# Patient Record
Sex: MALE | Race: WHITE | Employment: OTHER
[De-identification: names, ages, dates, MRNs, and addresses within clinical notes are randomized per-mention and may not be internally consistent; named-entity substitution may affect disease eponyms.]

---

## 2017-01-03 ENCOUNTER — CARE COORDINATION (OUTPATIENT)
Dept: CARE COORDINATION | Facility: CLINIC | Age: 62
End: 2017-01-03

## 2017-01-03 RX ORDER — GLUCOSAMINE HCL/CHONDROITIN SU 500-400 MG
CAPSULE ORAL
Qty: 100 STRIP | Refills: 3 | Status: SHIPPED | OUTPATIENT
Start: 2017-01-03 | End: 2017-03-29 | Stop reason: ALTCHOICE

## 2017-01-04 ENCOUNTER — TELEPHONE (OUTPATIENT)
Dept: FAMILY MEDICINE CLINIC | Facility: CLINIC | Age: 62
End: 2017-01-04

## 2017-01-09 ENCOUNTER — CARE COORDINATION (OUTPATIENT)
Dept: CARE COORDINATION | Facility: CLINIC | Age: 62
End: 2017-01-09

## 2017-01-09 ENCOUNTER — TELEPHONE (OUTPATIENT)
Dept: FAMILY MEDICINE CLINIC | Facility: CLINIC | Age: 62
End: 2017-01-09

## 2017-01-15 ENCOUNTER — TELEPHONE (OUTPATIENT)
Dept: FAMILY MEDICINE CLINIC | Facility: CLINIC | Age: 62
End: 2017-01-15

## 2017-02-08 ENCOUNTER — CARE COORDINATION (OUTPATIENT)
Dept: CARE COORDINATION | Facility: CLINIC | Age: 62
End: 2017-02-08

## 2017-03-06 RX ORDER — AMLODIPINE BESYLATE 5 MG/1
5 TABLET ORAL DAILY
Qty: 30 TABLET | Refills: 0 | Status: CANCELLED | OUTPATIENT
Start: 2017-03-06

## 2017-03-07 RX ORDER — LANOLIN ALCOHOL/MO/W.PET/CERES
325 CREAM (GRAM) TOPICAL 2 TIMES DAILY WITH MEALS
Qty: 60 TABLET | Refills: 0 | Status: SHIPPED | OUTPATIENT
Start: 2017-03-07 | End: 2017-05-25 | Stop reason: SDUPTHER

## 2017-03-07 RX ORDER — ESOMEPRAZOLE MAGNESIUM 40 MG/1
CAPSULE, DELAYED RELEASE ORAL
Qty: 180 CAPSULE | Refills: 0 | Status: SHIPPED | OUTPATIENT
Start: 2017-03-07 | End: 2017-06-22 | Stop reason: SDUPTHER

## 2017-03-07 RX ORDER — AMLODIPINE BESYLATE 5 MG/1
5 TABLET ORAL DAILY
Qty: 30 TABLET | Refills: 0 | Status: SHIPPED | OUTPATIENT
Start: 2017-03-07 | End: 2017-05-25 | Stop reason: SDUPTHER

## 2017-03-07 RX ORDER — CLOPIDOGREL BISULFATE 75 MG/1
75 TABLET ORAL DAILY
Qty: 30 TABLET | Refills: 0 | Status: SHIPPED | OUTPATIENT
Start: 2017-03-07 | End: 2017-05-25 | Stop reason: SDUPTHER

## 2017-03-10 ENCOUNTER — CARE COORDINATION (OUTPATIENT)
Dept: CARE COORDINATION | Facility: CLINIC | Age: 62
End: 2017-03-10

## 2017-03-13 ENCOUNTER — CARE COORDINATION (OUTPATIENT)
Dept: CASE MANAGEMENT | Age: 62
End: 2017-03-13

## 2017-03-14 ENCOUNTER — CARE COORDINATION (OUTPATIENT)
Dept: CASE MANAGEMENT | Age: 62
End: 2017-03-14

## 2017-03-15 ENCOUNTER — CARE COORDINATION (OUTPATIENT)
Dept: CASE MANAGEMENT | Age: 62
End: 2017-03-15

## 2017-03-16 ENCOUNTER — CARE COORDINATION (OUTPATIENT)
Dept: CASE MANAGEMENT | Age: 62
End: 2017-03-16

## 2017-03-17 ENCOUNTER — CARE COORDINATION (OUTPATIENT)
Dept: CASE MANAGEMENT | Age: 62
End: 2017-03-17

## 2017-03-17 DIAGNOSIS — Z79.4 TYPE 2 DIABETES MELLITUS WITH DIABETIC POLYNEUROPATHY, WITH LONG-TERM CURRENT USE OF INSULIN (HCC): Primary | ICD-10-CM

## 2017-03-17 DIAGNOSIS — E11.42 TYPE 2 DIABETES MELLITUS WITH DIABETIC POLYNEUROPATHY, WITH LONG-TERM CURRENT USE OF INSULIN (HCC): Primary | ICD-10-CM

## 2017-03-20 ENCOUNTER — TELEPHONE (OUTPATIENT)
Dept: FAMILY MEDICINE CLINIC | Age: 62
End: 2017-03-20

## 2017-03-20 ENCOUNTER — CARE COORDINATION (OUTPATIENT)
Dept: CASE MANAGEMENT | Age: 62
End: 2017-03-20

## 2017-03-20 DIAGNOSIS — Z79.4 TYPE 2 DIABETES MELLITUS WITH DIABETIC POLYNEUROPATHY, WITH LONG-TERM CURRENT USE OF INSULIN (HCC): ICD-10-CM

## 2017-03-20 DIAGNOSIS — E11.42 TYPE 2 DIABETES MELLITUS WITH DIABETIC POLYNEUROPATHY, WITH LONG-TERM CURRENT USE OF INSULIN (HCC): ICD-10-CM

## 2017-03-20 RX ORDER — OXYCODONE HYDROCHLORIDE AND ACETAMINOPHEN 5; 325 MG/1; MG/1
1 TABLET ORAL EVERY 6 HOURS PRN
Qty: 40 TABLET | Refills: 0 | Status: SHIPPED | OUTPATIENT
Start: 2017-03-20 | End: 2017-03-29 | Stop reason: SDUPTHER

## 2017-03-22 ENCOUNTER — EPISODE CHANGES (OUTPATIENT)
Dept: CASE MANAGEMENT | Age: 62
End: 2017-03-22

## 2017-03-28 ENCOUNTER — OFFICE VISIT (OUTPATIENT)
Dept: FAMILY MEDICINE CLINIC | Age: 62
End: 2017-03-28
Payer: MEDICARE

## 2017-03-28 VITALS — SYSTOLIC BLOOD PRESSURE: 105 MMHG | DIASTOLIC BLOOD PRESSURE: 62 MMHG | HEART RATE: 79 BPM | TEMPERATURE: 97.1 F

## 2017-03-28 DIAGNOSIS — E11.42 DM TYPE 2 WITH DIABETIC PERIPHERAL NEUROPATHY (HCC): ICD-10-CM

## 2017-03-28 DIAGNOSIS — L03.90 CELLULITIS, UNSPECIFIED CELLULITIS SITE: Primary | ICD-10-CM

## 2017-03-28 DIAGNOSIS — N18.9 CHRONIC KIDNEY DISEASE, UNSPECIFIED STAGE: ICD-10-CM

## 2017-03-28 DIAGNOSIS — I10 BENIGN ESSENTIAL HTN: ICD-10-CM

## 2017-03-28 PROCEDURE — 3046F HEMOGLOBIN A1C LEVEL >9.0%: CPT | Performed by: FAMILY MEDICINE

## 2017-03-28 PROCEDURE — G8427 DOCREV CUR MEDS BY ELIG CLIN: HCPCS | Performed by: FAMILY MEDICINE

## 2017-03-28 PROCEDURE — 99214 OFFICE O/P EST MOD 30 MIN: CPT | Performed by: FAMILY MEDICINE

## 2017-03-28 PROCEDURE — G8484 FLU IMMUNIZE NO ADMIN: HCPCS | Performed by: FAMILY MEDICINE

## 2017-03-28 PROCEDURE — 3017F COLORECTAL CA SCREEN DOC REV: CPT | Performed by: FAMILY MEDICINE

## 2017-03-28 PROCEDURE — G8420 CALC BMI NORM PARAMETERS: HCPCS | Performed by: FAMILY MEDICINE

## 2017-03-28 PROCEDURE — 1036F TOBACCO NON-USER: CPT | Performed by: FAMILY MEDICINE

## 2017-03-28 RX ORDER — DOXYCYCLINE HYCLATE 100 MG
100 TABLET ORAL 2 TIMES DAILY
Qty: 14 TABLET | Refills: 0 | Status: SHIPPED | OUTPATIENT
Start: 2017-03-28 | End: 2017-04-04

## 2017-03-28 RX ORDER — CEPHALEXIN 500 MG/1
500 CAPSULE ORAL 3 TIMES DAILY
Qty: 21 CAPSULE | Refills: 0 | Status: SHIPPED | OUTPATIENT
Start: 2017-03-28 | End: 2017-04-04

## 2017-03-28 ASSESSMENT — ENCOUNTER SYMPTOMS
VOMITING: 0
ABDOMINAL PAIN: 0
SHORTNESS OF BREATH: 0
COUGH: 1
DIARRHEA: 0

## 2017-03-29 RX ORDER — OXYCODONE HYDROCHLORIDE AND ACETAMINOPHEN 5; 325 MG/1; MG/1
1 TABLET ORAL EVERY 6 HOURS PRN
Qty: 40 TABLET | Refills: 0 | Status: SHIPPED | OUTPATIENT
Start: 2017-03-29 | End: 2017-04-07 | Stop reason: SDUPTHER

## 2017-03-29 ASSESSMENT — ENCOUNTER SYMPTOMS
BLOOD IN STOOL: 0
WHEEZING: 0
RHINORRHEA: 1
SINUS PRESSURE: 0
SORE THROAT: 0
EYE PAIN: 0

## 2017-04-07 ENCOUNTER — CARE COORDINATOR VISIT (OUTPATIENT)
Dept: CARE COORDINATION | Age: 62
End: 2017-04-07

## 2017-04-07 ENCOUNTER — OFFICE VISIT (OUTPATIENT)
Dept: FAMILY MEDICINE CLINIC | Age: 62
End: 2017-04-07
Payer: MEDICARE

## 2017-04-07 ENCOUNTER — CARE COORDINATION (OUTPATIENT)
Dept: CARE COORDINATION | Age: 62
End: 2017-04-07

## 2017-04-07 VITALS
SYSTOLIC BLOOD PRESSURE: 111 MMHG | HEART RATE: 72 BPM | TEMPERATURE: 97.2 F | DIASTOLIC BLOOD PRESSURE: 66 MMHG | OXYGEN SATURATION: 99 %

## 2017-04-07 DIAGNOSIS — G89.4 CHRONIC PAIN SYNDROME: ICD-10-CM

## 2017-04-07 DIAGNOSIS — L03.211 CELLULITIS OF FACE: ICD-10-CM

## 2017-04-07 DIAGNOSIS — E11.42 DM TYPE 2 WITH DIABETIC PERIPHERAL NEUROPATHY (HCC): Primary | ICD-10-CM

## 2017-04-07 DIAGNOSIS — I10 BENIGN ESSENTIAL HTN: ICD-10-CM

## 2017-04-07 PROCEDURE — G8427 DOCREV CUR MEDS BY ELIG CLIN: HCPCS | Performed by: FAMILY MEDICINE

## 2017-04-07 PROCEDURE — 3017F COLORECTAL CA SCREEN DOC REV: CPT | Performed by: FAMILY MEDICINE

## 2017-04-07 PROCEDURE — 3046F HEMOGLOBIN A1C LEVEL >9.0%: CPT | Performed by: FAMILY MEDICINE

## 2017-04-07 PROCEDURE — 99214 OFFICE O/P EST MOD 30 MIN: CPT | Performed by: FAMILY MEDICINE

## 2017-04-07 PROCEDURE — 1036F TOBACCO NON-USER: CPT | Performed by: FAMILY MEDICINE

## 2017-04-07 PROCEDURE — G8420 CALC BMI NORM PARAMETERS: HCPCS | Performed by: FAMILY MEDICINE

## 2017-04-07 RX ORDER — OXYCODONE HYDROCHLORIDE AND ACETAMINOPHEN 5; 325 MG/1; MG/1
1 TABLET ORAL EVERY 6 HOURS PRN
Qty: 60 TABLET | Refills: 0 | Status: SHIPPED | OUTPATIENT
Start: 2017-04-07 | End: 2017-04-24 | Stop reason: SDUPTHER

## 2017-04-07 ASSESSMENT — ENCOUNTER SYMPTOMS
WHEEZING: 0
TROUBLE SWALLOWING: 0
DIARRHEA: 0
BLOOD IN STOOL: 0
SORE THROAT: 0
ABDOMINAL PAIN: 0
SHORTNESS OF BREATH: 0
VOMITING: 0

## 2017-04-17 RX ORDER — INSULIN GLARGINE 100 [IU]/ML
INJECTION, SOLUTION SUBCUTANEOUS
Qty: 15 ML | Refills: 2 | Status: SHIPPED | OUTPATIENT
Start: 2017-04-17 | End: 2017-05-25 | Stop reason: SDUPTHER

## 2017-04-21 ENCOUNTER — CARE COORDINATOR VISIT (OUTPATIENT)
Dept: FAMILY MEDICINE CLINIC | Age: 62
End: 2017-04-21

## 2017-04-21 ENCOUNTER — OFFICE VISIT (OUTPATIENT)
Dept: FAMILY MEDICINE CLINIC | Age: 62
End: 2017-04-21
Payer: MEDICARE

## 2017-04-21 ENCOUNTER — HOSPITAL ENCOUNTER (OUTPATIENT)
Age: 62
Discharge: HOME | End: 2017-04-21
Payer: MEDICARE

## 2017-04-21 ENCOUNTER — HOSPITAL ENCOUNTER (OUTPATIENT)
Dept: GENERAL RADIOLOGY | Age: 62
Discharge: HOME | End: 2017-04-21
Payer: MEDICARE

## 2017-04-21 VITALS
RESPIRATION RATE: 18 BRPM | WEIGHT: 180.78 LBS | HEART RATE: 96 BPM | DIASTOLIC BLOOD PRESSURE: 72 MMHG | BODY MASS INDEX: 24.49 KG/M2 | HEIGHT: 72 IN | SYSTOLIC BLOOD PRESSURE: 114 MMHG | OXYGEN SATURATION: 98 %

## 2017-04-21 DIAGNOSIS — E11.42 DM TYPE 2 WITH DIABETIC PERIPHERAL NEUROPATHY (HCC): ICD-10-CM

## 2017-04-21 DIAGNOSIS — L03.818 CELLULITIS OF OTHER SPECIFIED SITE: ICD-10-CM

## 2017-04-21 DIAGNOSIS — M25.561 ACUTE PAIN OF RIGHT KNEE: Primary | ICD-10-CM

## 2017-04-21 DIAGNOSIS — M25.561 ACUTE PAIN OF RIGHT KNEE: ICD-10-CM

## 2017-04-21 DIAGNOSIS — I10 BENIGN ESSENTIAL HTN: ICD-10-CM

## 2017-04-21 DIAGNOSIS — G89.4 CHRONIC PAIN SYNDROME: ICD-10-CM

## 2017-04-21 LAB
-: NORMAL
ABSOLUTE EOS #: 0.2 K/UL (ref 0–0.4)
ABSOLUTE LYMPH #: 2 K/UL (ref 1–4.8)
ABSOLUTE MONO #: 0.8 K/UL (ref 0.2–0.8)
ALBUMIN SERPL-MCNC: 3.6 G/DL (ref 3.5–5.2)
ALBUMIN/GLOBULIN RATIO: ABNORMAL (ref 1–2.5)
ALP BLD-CCNC: 185 U/L (ref 40–129)
ALT SERPL-CCNC: 14 U/L (ref 5–41)
AMORPHOUS: NORMAL
ANION GAP SERPL CALCULATED.3IONS-SCNC: 18 MMOL/L (ref 9–17)
AST SERPL-CCNC: 9 U/L
BACTERIA: NORMAL
BASOPHILS # BLD: 1 % (ref 0–2)
BASOPHILS ABSOLUTE: 0.1 K/UL (ref 0–0.2)
BILIRUB SERPL-MCNC: <0.1 MG/DL (ref 0.3–1.2)
BILIRUBIN URINE: NEGATIVE
BUN BLDV-MCNC: 22 MG/DL (ref 6–20)
BUN/CREAT BLD: 24 (ref 9–20)
CALCIUM SERPL-MCNC: 8.6 MG/DL (ref 8.6–10.4)
CASTS UA: NORMAL /LPF
CHLORIDE BLD-SCNC: 100 MMOL/L (ref 98–107)
CO2: 21 MMOL/L (ref 20–31)
COLOR: YELLOW
COMMENT UA: ABNORMAL
CREAT SERPL-MCNC: 0.93 MG/DL (ref 0.7–1.2)
CRYSTALS, UA: NORMAL /HPF
DIFFERENTIAL TYPE: ABNORMAL
EOSINOPHILS RELATIVE PERCENT: 3 % (ref 1–4)
EPITHELIAL CELLS UA: NORMAL /HPF
ESTIMATED AVERAGE GLUCOSE: 263 MG/DL
GFR AFRICAN AMERICAN: >60 ML/MIN
GFR NON-AFRICAN AMERICAN: >60 ML/MIN
GFR SERPL CREATININE-BSD FRML MDRD: ABNORMAL ML/MIN/{1.73_M2}
GFR SERPL CREATININE-BSD FRML MDRD: ABNORMAL ML/MIN/{1.73_M2}
GLUCOSE BLD-MCNC: 335 MG/DL (ref 70–99)
GLUCOSE URINE: ABNORMAL
HBA1C MFR BLD: 10.8 % (ref 4–6)
HCT VFR BLD CALC: 35.7 % (ref 41–53)
HEMOGLOBIN: 11.4 G/DL (ref 13.5–17.5)
KETONES, URINE: NEGATIVE
LEUKOCYTE ESTERASE, URINE: NEGATIVE
LYMPHOCYTES # BLD: 23 % (ref 24–44)
MCH RBC QN AUTO: 26 PG (ref 26–34)
MCHC RBC AUTO-ENTMCNC: 31.8 G/DL (ref 31–37)
MCV RBC AUTO: 81.7 FL (ref 80–100)
MONOCYTES # BLD: 9 % (ref 1–7)
MUCUS: NORMAL
NITRITE, URINE: NEGATIVE
OTHER OBSERVATIONS UA: NORMAL
PDW BLD-RTO: 18.8 % (ref 11.5–14.5)
PH UA: 5.5 (ref 5–8)
PLATELET # BLD: 448 K/UL (ref 130–400)
PLATELET ESTIMATE: ABNORMAL
PMV BLD AUTO: 7.7 FL (ref 6–12)
POTASSIUM SERPL-SCNC: 4.4 MMOL/L (ref 3.7–5.3)
PROTEIN UA: ABNORMAL
RBC # BLD: 4.38 M/UL (ref 4.5–5.9)
RBC # BLD: ABNORMAL 10*6/UL
RBC UA: NORMAL /HPF (ref 0–2)
RENAL EPITHELIAL, UA: NORMAL /HPF
SEG NEUTROPHILS: 64 % (ref 36–66)
SEGMENTED NEUTROPHILS ABSOLUTE COUNT: 5.6 K/UL (ref 1.8–7.7)
SODIUM BLD-SCNC: 139 MMOL/L (ref 135–144)
SPECIFIC GRAVITY UA: 1.03 (ref 1–1.03)
TOTAL PROTEIN: 7.4 G/DL (ref 6.4–8.3)
TRICHOMONAS: NORMAL
TURBIDITY: CLEAR
URINE HGB: ABNORMAL
UROBILINOGEN, URINE: NORMAL
WBC # BLD: 8.7 K/UL (ref 3.5–11)
WBC # BLD: ABNORMAL 10*3/UL
WBC UA: NORMAL /HPF (ref 0–5)
YEAST: NORMAL

## 2017-04-21 PROCEDURE — 85025 COMPLETE CBC W/AUTO DIFF WBC: CPT

## 2017-04-21 PROCEDURE — 80053 COMPREHEN METABOLIC PANEL: CPT

## 2017-04-21 PROCEDURE — 99214 OFFICE O/P EST MOD 30 MIN: CPT | Performed by: FAMILY MEDICINE

## 2017-04-21 PROCEDURE — 3017F COLORECTAL CA SCREEN DOC REV: CPT | Performed by: FAMILY MEDICINE

## 2017-04-21 PROCEDURE — 3046F HEMOGLOBIN A1C LEVEL >9.0%: CPT | Performed by: FAMILY MEDICINE

## 2017-04-21 PROCEDURE — 1036F TOBACCO NON-USER: CPT | Performed by: FAMILY MEDICINE

## 2017-04-21 PROCEDURE — G8427 DOCREV CUR MEDS BY ELIG CLIN: HCPCS | Performed by: FAMILY MEDICINE

## 2017-04-21 PROCEDURE — 36415 COLL VENOUS BLD VENIPUNCTURE: CPT

## 2017-04-21 PROCEDURE — 83036 HEMOGLOBIN GLYCOSYLATED A1C: CPT

## 2017-04-21 PROCEDURE — 73562 X-RAY EXAM OF KNEE 3: CPT

## 2017-04-21 PROCEDURE — G8420 CALC BMI NORM PARAMETERS: HCPCS | Performed by: FAMILY MEDICINE

## 2017-04-21 PROCEDURE — 81001 URINALYSIS AUTO W/SCOPE: CPT

## 2017-04-21 RX ORDER — DOXYCYCLINE HYCLATE 100 MG
100 TABLET ORAL 2 TIMES DAILY
Qty: 20 TABLET | Refills: 0 | Status: SHIPPED | OUTPATIENT
Start: 2017-04-21 | End: 2017-05-01

## 2017-04-21 RX ORDER — CEPHALEXIN 500 MG/1
500 CAPSULE ORAL 3 TIMES DAILY
Qty: 30 CAPSULE | Refills: 0 | Status: SHIPPED | OUTPATIENT
Start: 2017-04-21 | End: 2017-05-01

## 2017-04-23 ASSESSMENT — ENCOUNTER SYMPTOMS
DIARRHEA: 0
WHEEZING: 0
SORE THROAT: 0
BLOOD IN STOOL: 0
ABDOMINAL PAIN: 0
TROUBLE SWALLOWING: 0
SHORTNESS OF BREATH: 0
VOMITING: 0

## 2017-04-24 RX ORDER — OXYCODONE HYDROCHLORIDE AND ACETAMINOPHEN 5; 325 MG/1; MG/1
1 TABLET ORAL EVERY 6 HOURS PRN
Qty: 60 TABLET | Refills: 0 | Status: SHIPPED | OUTPATIENT
Start: 2017-04-24 | End: 2017-05-08 | Stop reason: SDUPTHER

## 2017-04-24 RX ORDER — PREGABALIN 100 MG/1
CAPSULE ORAL
Qty: 90 CAPSULE | Refills: 0 | Status: SHIPPED | OUTPATIENT
Start: 2017-04-24 | End: 2017-09-15 | Stop reason: ALTCHOICE

## 2017-04-28 ENCOUNTER — CARE COORDINATION (OUTPATIENT)
Dept: FAMILY MEDICINE CLINIC | Age: 62
End: 2017-04-28

## 2017-05-04 ENCOUNTER — TELEPHONE (OUTPATIENT)
Dept: FAMILY MEDICINE CLINIC | Age: 62
End: 2017-05-04

## 2017-05-04 DIAGNOSIS — G89.4 CHRONIC PAIN SYNDROME: Primary | ICD-10-CM

## 2017-05-08 RX ORDER — OXYCODONE HYDROCHLORIDE AND ACETAMINOPHEN 5; 325 MG/1; MG/1
1 TABLET ORAL EVERY 6 HOURS PRN
Qty: 60 TABLET | Refills: 0 | Status: SHIPPED | OUTPATIENT
Start: 2017-05-08 | End: 2017-05-18

## 2017-05-23 ENCOUNTER — TELEPHONE (OUTPATIENT)
Dept: FAMILY MEDICINE CLINIC | Age: 62
End: 2017-05-23

## 2017-05-24 ENCOUNTER — HOSPITAL ENCOUNTER (EMERGENCY)
Age: 62
Discharge: HOME | End: 2017-05-24
Attending: EMERGENCY MEDICINE
Payer: MEDICARE

## 2017-05-24 VITALS
DIASTOLIC BLOOD PRESSURE: 68 MMHG | WEIGHT: 75.13 LBS | BODY MASS INDEX: 10.18 KG/M2 | TEMPERATURE: 97.9 F | OXYGEN SATURATION: 98 % | HEART RATE: 95 BPM | RESPIRATION RATE: 18 BRPM | SYSTOLIC BLOOD PRESSURE: 122 MMHG | HEIGHT: 72 IN

## 2017-05-24 DIAGNOSIS — S05.01XA CORNEAL ABRASION, RIGHT, INITIAL ENCOUNTER: ICD-10-CM

## 2017-05-24 DIAGNOSIS — M79.604 CHRONIC PAIN OF BOTH LOWER EXTREMITIES: Primary | ICD-10-CM

## 2017-05-24 DIAGNOSIS — G89.29 CHRONIC PAIN OF BOTH LOWER EXTREMITIES: Primary | ICD-10-CM

## 2017-05-24 DIAGNOSIS — M79.605 CHRONIC PAIN OF BOTH LOWER EXTREMITIES: Primary | ICD-10-CM

## 2017-05-24 PROCEDURE — 99282 EMERGENCY DEPT VISIT SF MDM: CPT

## 2017-05-24 RX ORDER — OXYCODONE HYDROCHLORIDE AND ACETAMINOPHEN 5; 325 MG/1; MG/1
1 TABLET ORAL EVERY 6 HOURS PRN
Qty: 20 TABLET | Refills: 0 | Status: SHIPPED | OUTPATIENT
Start: 2017-05-24 | End: 2017-05-25 | Stop reason: SDUPTHER

## 2017-05-24 RX ORDER — OFLOXACIN 3 MG/ML
1-2 SOLUTION/ DROPS OPHTHALMIC 4 TIMES DAILY
Qty: 1 BOTTLE | Refills: 0 | Status: SHIPPED | OUTPATIENT
Start: 2017-05-24 | End: 2017-06-03

## 2017-05-24 ASSESSMENT — ENCOUNTER SYMPTOMS
SORE THROAT: 0
SINUS PRESSURE: 0
WHEEZING: 0
SHORTNESS OF BREATH: 0
VOMITING: 0
DIARRHEA: 0
EYE PAIN: 1
EYE REDNESS: 1
CONSTIPATION: 0
RHINORRHEA: 0
NAUSEA: 0
COLOR CHANGE: 0
COUGH: 0
ABDOMINAL PAIN: 0

## 2017-05-24 ASSESSMENT — PAIN SCALES - GENERAL: PAINLEVEL_OUTOF10: 10

## 2017-05-24 ASSESSMENT — PAIN DESCRIPTION - DESCRIPTORS: DESCRIPTORS: THROBBING;ACHING

## 2017-05-24 ASSESSMENT — PAIN DESCRIPTION - LOCATION: LOCATION: LEG

## 2017-05-24 ASSESSMENT — VISUAL ACUITY
OU: 20/25
OS: 20/25

## 2017-05-24 ASSESSMENT — PAIN DESCRIPTION - PAIN TYPE: TYPE: ACUTE PAIN

## 2017-05-24 ASSESSMENT — PAIN DESCRIPTION - ORIENTATION: ORIENTATION: RIGHT;LEFT

## 2017-05-25 ENCOUNTER — OFFICE VISIT (OUTPATIENT)
Dept: FAMILY MEDICINE CLINIC | Age: 62
End: 2017-05-25
Payer: MEDICARE

## 2017-05-25 VITALS
DIASTOLIC BLOOD PRESSURE: 73 MMHG | HEART RATE: 83 BPM | SYSTOLIC BLOOD PRESSURE: 115 MMHG | OXYGEN SATURATION: 99 % | TEMPERATURE: 97 F | BODY MASS INDEX: 22.38 KG/M2 | WEIGHT: 165 LBS

## 2017-05-25 DIAGNOSIS — M79.2 NEUROPATHIC PAIN, LEG, UNSPECIFIED LATERALITY: ICD-10-CM

## 2017-05-25 DIAGNOSIS — J43.9 PULMONARY EMPHYSEMA, UNSPECIFIED EMPHYSEMA TYPE (HCC): ICD-10-CM

## 2017-05-25 DIAGNOSIS — Z89.511 STATUS POST BELOW KNEE AMPUTATION OF RIGHT LOWER EXTREMITY (HCC): ICD-10-CM

## 2017-05-25 DIAGNOSIS — E78.2 MIXED HYPERLIPIDEMIA: ICD-10-CM

## 2017-05-25 DIAGNOSIS — K21.9 GASTROESOPHAGEAL REFLUX DISEASE, ESOPHAGITIS PRESENCE NOT SPECIFIED: ICD-10-CM

## 2017-05-25 DIAGNOSIS — E11.42 TYPE 2 DIABETES MELLITUS WITH DIABETIC POLYNEUROPATHY, WITH LONG-TERM CURRENT USE OF INSULIN (HCC): ICD-10-CM

## 2017-05-25 DIAGNOSIS — I73.9 PAD (PERIPHERAL ARTERY DISEASE) (HCC): ICD-10-CM

## 2017-05-25 DIAGNOSIS — Z72.0 TOBACCO ABUSE: ICD-10-CM

## 2017-05-25 DIAGNOSIS — I10 ESSENTIAL HYPERTENSION: Primary | ICD-10-CM

## 2017-05-25 DIAGNOSIS — Z79.4 TYPE 2 DIABETES MELLITUS WITH DIABETIC POLYNEUROPATHY, WITH LONG-TERM CURRENT USE OF INSULIN (HCC): ICD-10-CM

## 2017-05-25 PROCEDURE — 3023F SPIROM DOC REV: CPT | Performed by: INTERNAL MEDICINE

## 2017-05-25 PROCEDURE — G8420 CALC BMI NORM PARAMETERS: HCPCS | Performed by: INTERNAL MEDICINE

## 2017-05-25 PROCEDURE — 1036F TOBACCO NON-USER: CPT | Performed by: INTERNAL MEDICINE

## 2017-05-25 PROCEDURE — 3046F HEMOGLOBIN A1C LEVEL >9.0%: CPT | Performed by: INTERNAL MEDICINE

## 2017-05-25 PROCEDURE — G8427 DOCREV CUR MEDS BY ELIG CLIN: HCPCS | Performed by: INTERNAL MEDICINE

## 2017-05-25 PROCEDURE — 3017F COLORECTAL CA SCREEN DOC REV: CPT | Performed by: INTERNAL MEDICINE

## 2017-05-25 PROCEDURE — 99214 OFFICE O/P EST MOD 30 MIN: CPT | Performed by: INTERNAL MEDICINE

## 2017-05-25 PROCEDURE — G8926 SPIRO NO PERF OR DOC: HCPCS | Performed by: INTERNAL MEDICINE

## 2017-05-25 RX ORDER — LANOLIN ALCOHOL/MO/W.PET/CERES
325 CREAM (GRAM) TOPICAL 2 TIMES DAILY WITH MEALS
Qty: 60 TABLET | Refills: 0 | Status: ON HOLD | OUTPATIENT
Start: 2017-05-25 | End: 2018-01-22 | Stop reason: ALTCHOICE

## 2017-05-25 RX ORDER — OXYCODONE HYDROCHLORIDE AND ACETAMINOPHEN 5; 325 MG/1; MG/1
1 TABLET ORAL EVERY 6 HOURS PRN
Qty: 40 TABLET | Refills: 0 | Status: SHIPPED | OUTPATIENT
Start: 2017-05-29 | End: 2017-06-10

## 2017-05-25 RX ORDER — TAMSULOSIN HYDROCHLORIDE 0.4 MG/1
0.4 CAPSULE ORAL DAILY
Qty: 30 CAPSULE | Refills: 3 | Status: SHIPPED | OUTPATIENT
Start: 2017-05-25 | End: 2018-08-15 | Stop reason: ALTCHOICE

## 2017-05-25 RX ORDER — AMLODIPINE BESYLATE 5 MG/1
5 TABLET ORAL DAILY
Qty: 30 TABLET | Refills: 0 | Status: SHIPPED | OUTPATIENT
Start: 2017-05-25 | End: 2018-09-12 | Stop reason: ALTCHOICE

## 2017-05-25 RX ORDER — ATORVASTATIN CALCIUM 10 MG/1
TABLET, FILM COATED ORAL
Qty: 30 TABLET | Refills: 3 | Status: ON HOLD | OUTPATIENT
Start: 2017-05-25 | End: 2018-01-22 | Stop reason: ALTCHOICE

## 2017-05-25 RX ORDER — LISINOPRIL 10 MG/1
1 TABLET ORAL DAILY
Refills: 3 | COMMUNITY
Start: 2017-04-11 | End: 2018-08-15 | Stop reason: ALTCHOICE

## 2017-05-25 RX ORDER — CLOPIDOGREL BISULFATE 75 MG/1
75 TABLET ORAL DAILY
Qty: 30 TABLET | Refills: 0 | Status: SHIPPED | OUTPATIENT
Start: 2017-05-25 | End: 2017-10-27 | Stop reason: ALTCHOICE

## 2017-05-25 RX ORDER — ASPIRIN 81 MG/1
TABLET ORAL
Qty: 30 TABLET | Refills: 5 | Status: SHIPPED | OUTPATIENT
Start: 2017-05-25 | End: 2017-10-27 | Stop reason: ALTCHOICE

## 2017-05-25 RX ORDER — INSULIN DETEMIR 100 [IU]/ML
45 INJECTION, SOLUTION SUBCUTANEOUS 2 TIMES DAILY
Refills: 2 | COMMUNITY
Start: 2017-04-18 | End: 2017-05-25

## 2017-05-25 RX ORDER — ALBUTEROL SULFATE 90 UG/1
AEROSOL, METERED RESPIRATORY (INHALATION)
Qty: 18 G | Refills: 5 | Status: SHIPPED | OUTPATIENT
Start: 2017-05-25 | End: 2018-02-01 | Stop reason: SDUPTHER

## 2017-06-09 RX ORDER — PREGABALIN 100 MG/1
CAPSULE ORAL
Qty: 90 CAPSULE | Refills: 0 | Status: CANCELLED | OUTPATIENT
Start: 2017-06-09

## 2017-06-09 RX ORDER — OXYCODONE HYDROCHLORIDE AND ACETAMINOPHEN 5; 325 MG/1; MG/1
1 TABLET ORAL EVERY 6 HOURS PRN
Qty: 40 TABLET | Refills: 0 | Status: CANCELLED | OUTPATIENT
Start: 2017-06-09

## 2017-06-10 ENCOUNTER — HOSPITAL ENCOUNTER (EMERGENCY)
Age: 62
Discharge: HOME | End: 2017-06-10
Attending: EMERGENCY MEDICINE
Payer: MEDICARE

## 2017-06-10 VITALS
HEIGHT: 72 IN | TEMPERATURE: 97.6 F | WEIGHT: 173.94 LBS | RESPIRATION RATE: 18 BRPM | HEART RATE: 101 BPM | DIASTOLIC BLOOD PRESSURE: 90 MMHG | BODY MASS INDEX: 23.56 KG/M2 | SYSTOLIC BLOOD PRESSURE: 131 MMHG | OXYGEN SATURATION: 97 %

## 2017-06-10 DIAGNOSIS — G89.4 CHRONIC PAIN SYNDROME: Primary | ICD-10-CM

## 2017-06-10 PROCEDURE — 99281 EMR DPT VST MAYX REQ PHY/QHP: CPT

## 2017-06-10 RX ORDER — OXYCODONE HYDROCHLORIDE AND ACETAMINOPHEN 5; 325 MG/1; MG/1
1 TABLET ORAL EVERY 6 HOURS PRN
Qty: 5 TABLET | Refills: 0 | Status: SHIPPED | OUTPATIENT
Start: 2017-06-10 | End: 2017-07-26 | Stop reason: ALTCHOICE

## 2017-06-10 ASSESSMENT — ENCOUNTER SYMPTOMS
COLOR CHANGE: 0
DIARRHEA: 0
FACIAL SWELLING: 0
EYE REDNESS: 0
EYE DISCHARGE: 0
SHORTNESS OF BREATH: 0
COUGH: 0
VOMITING: 0
ABDOMINAL PAIN: 0
CONSTIPATION: 0

## 2017-06-10 ASSESSMENT — PAIN SCALES - GENERAL: PAINLEVEL_OUTOF10: 10

## 2017-06-10 ASSESSMENT — PAIN DESCRIPTION - PAIN TYPE: TYPE: CHRONIC PAIN

## 2017-06-10 ASSESSMENT — PAIN DESCRIPTION - LOCATION: LOCATION: LEG

## 2017-06-10 ASSESSMENT — PAIN DESCRIPTION - ORIENTATION: ORIENTATION: RIGHT

## 2017-06-10 ASSESSMENT — PAIN DESCRIPTION - DESCRIPTORS: DESCRIPTORS: ACHING;THROBBING

## 2017-06-13 ENCOUNTER — CARE COORDINATION (OUTPATIENT)
Dept: FAMILY MEDICINE CLINIC | Age: 62
End: 2017-06-13

## 2017-06-22 ENCOUNTER — OFFICE VISIT (OUTPATIENT)
Dept: FAMILY MEDICINE CLINIC | Age: 62
End: 2017-06-22
Payer: MEDICARE

## 2017-06-22 VITALS
TEMPERATURE: 97.1 F | WEIGHT: 172.8 LBS | BODY MASS INDEX: 23.4 KG/M2 | HEART RATE: 85 BPM | SYSTOLIC BLOOD PRESSURE: 110 MMHG | DIASTOLIC BLOOD PRESSURE: 71 MMHG | HEIGHT: 72 IN

## 2017-06-22 DIAGNOSIS — G47.00 INSOMNIA, UNSPECIFIED TYPE: ICD-10-CM

## 2017-06-22 DIAGNOSIS — K21.9 GASTROESOPHAGEAL REFLUX DISEASE, ESOPHAGITIS PRESENCE NOT SPECIFIED: Primary | ICD-10-CM

## 2017-06-22 DIAGNOSIS — J30.2 SEASONAL ALLERGIC RHINITIS, UNSPECIFIED ALLERGIC RHINITIS TRIGGER: ICD-10-CM

## 2017-06-22 DIAGNOSIS — M79.2 NEUROPATHIC PAIN, LEG, RIGHT: ICD-10-CM

## 2017-06-22 PROCEDURE — G8427 DOCREV CUR MEDS BY ELIG CLIN: HCPCS | Performed by: STUDENT IN AN ORGANIZED HEALTH CARE EDUCATION/TRAINING PROGRAM

## 2017-06-22 PROCEDURE — G8420 CALC BMI NORM PARAMETERS: HCPCS | Performed by: STUDENT IN AN ORGANIZED HEALTH CARE EDUCATION/TRAINING PROGRAM

## 2017-06-22 PROCEDURE — 1036F TOBACCO NON-USER: CPT | Performed by: STUDENT IN AN ORGANIZED HEALTH CARE EDUCATION/TRAINING PROGRAM

## 2017-06-22 PROCEDURE — 99213 OFFICE O/P EST LOW 20 MIN: CPT | Performed by: STUDENT IN AN ORGANIZED HEALTH CARE EDUCATION/TRAINING PROGRAM

## 2017-06-22 PROCEDURE — 3017F COLORECTAL CA SCREEN DOC REV: CPT | Performed by: STUDENT IN AN ORGANIZED HEALTH CARE EDUCATION/TRAINING PROGRAM

## 2017-06-22 PROCEDURE — 99213 OFFICE O/P EST LOW 20 MIN: CPT

## 2017-06-22 RX ORDER — ESOMEPRAZOLE MAGNESIUM 40 MG/1
CAPSULE, DELAYED RELEASE ORAL
Qty: 180 CAPSULE | Refills: 0 | Status: SHIPPED | OUTPATIENT
Start: 2017-06-22 | End: 2017-07-24 | Stop reason: RX

## 2017-06-22 RX ORDER — FLUTICASONE PROPIONATE 50 MCG
1 SPRAY, SUSPENSION (ML) NASAL DAILY PRN
Qty: 1 BOTTLE | Refills: 2 | Status: SHIPPED | OUTPATIENT
Start: 2017-06-22 | End: 2018-08-15 | Stop reason: ALTCHOICE

## 2017-06-22 RX ORDER — UREA 10 %
1 LOTION (ML) TOPICAL NIGHTLY PRN
Qty: 30 TABLET | Refills: 1 | Status: SHIPPED | OUTPATIENT
Start: 2017-06-22 | End: 2017-07-24 | Stop reason: RX

## 2017-06-22 ASSESSMENT — ENCOUNTER SYMPTOMS
COUGH: 0
NAUSEA: 0
SHORTNESS OF BREATH: 1
CONSTIPATION: 0
VOMITING: 0
PHOTOPHOBIA: 0
EYE PAIN: 0
ABDOMINAL PAIN: 0
WHEEZING: 0
EYE ITCHING: 1
DIARRHEA: 0
RHINORRHEA: 1

## 2017-06-30 ENCOUNTER — HOSPITAL ENCOUNTER (OUTPATIENT)
Dept: PAIN MANAGEMENT | Age: 62
Discharge: HOME | End: 2017-06-30
Payer: MEDICARE

## 2017-06-30 VITALS
WEIGHT: 175 LBS | TEMPERATURE: 98 F | SYSTOLIC BLOOD PRESSURE: 130 MMHG | HEIGHT: 72 IN | HEART RATE: 86 BPM | RESPIRATION RATE: 16 BRPM | DIASTOLIC BLOOD PRESSURE: 74 MMHG | BODY MASS INDEX: 23.7 KG/M2

## 2017-06-30 PROCEDURE — 80307 DRUG TEST PRSMV CHEM ANLYZR: CPT

## 2017-06-30 PROCEDURE — 99204 OFFICE O/P NEW MOD 45 MIN: CPT

## 2017-06-30 RX ORDER — OXYCODONE HYDROCHLORIDE 5 MG/1
5 TABLET ORAL EVERY 8 HOURS PRN
COMMUNITY
End: 2017-06-30 | Stop reason: SDUPTHER

## 2017-06-30 RX ORDER — OXYCODONE HYDROCHLORIDE 5 MG/1
5 TABLET ORAL EVERY 8 HOURS PRN
Qty: 90 TABLET | Refills: 0 | Status: SHIPPED | OUTPATIENT
Start: 2017-06-30 | End: 2017-07-30

## 2017-06-30 ASSESSMENT — PAIN DESCRIPTION - PROGRESSION: CLINICAL_PROGRESSION: NOT CHANGED

## 2017-06-30 ASSESSMENT — PAIN DESCRIPTION - ORIENTATION: ORIENTATION: RIGHT;LEFT

## 2017-06-30 ASSESSMENT — PAIN DESCRIPTION - PAIN TYPE: TYPE: CHRONIC PAIN

## 2017-06-30 ASSESSMENT — PAIN SCALES - GENERAL: PAINLEVEL_OUTOF10: 6

## 2017-06-30 ASSESSMENT — PAIN DESCRIPTION - FREQUENCY: FREQUENCY: INTERMITTENT

## 2017-06-30 ASSESSMENT — PAIN DESCRIPTION - DESCRIPTORS: DESCRIPTORS: PINS AND NEEDLES;CONSTANT

## 2017-06-30 ASSESSMENT — PAIN DESCRIPTION - ONSET: ONSET: ON-GOING

## 2017-07-03 LAB
6-ACETYLMORPHINE, UR: NOT DETECTED
7-AMINOCLONAZEPAM, URINE: NOT DETECTED
ALPHA-OH-ALPRAZ, URINE: NOT DETECTED
ALPRAZOLAM, URINE: NOT DETECTED
AMPHETAMINES, URINE: NOT DETECTED
BARBITURATES, URINE: NOT DETECTED
BENZOYLECGONINE, UR: NOT DETECTED
BUPRENORPHINE URINE: NOT DETECTED
CARISOPRODOL, UR: NOT DETECTED
CLONAZEPAM, URINE: NOT DETECTED
CODEINE, URINE: NOT DETECTED
CREATININE URINE: 109.5 MG/DL (ref 20–400)
DIAZEPAM, URINE: NOT DETECTED
EER PAIN MGT DRUG PANEL, HIGH RES/EMIT U: NORMAL
ETHYL GLUCURONIDE UR: NOT DETECTED
FENTANYL URINE: NOT DETECTED
HYDROCODONE, URINE: NOT DETECTED
HYDROMORPHONE, URINE: NOT DETECTED
LORAZEPAM, URINE: NOT DETECTED
MARIJUANA METAB, UR: NOT DETECTED
MDA, UR: NOT DETECTED
MDEA, EVE, UR: NOT DETECTED
MDMA URINE: NOT DETECTED
MEPERIDINE METAB, UR: NOT DETECTED
METHADONE, URINE: NOT DETECTED
METHAMPHETAMINE, URINE: NOT DETECTED
METHYLPHENIDATE: NOT DETECTED
MIDAZOLAM, URINE: NOT DETECTED
MORPHINE URINE: NOT DETECTED
NORBUPRENORPHINE, URINE: NOT DETECTED
NORDIAZEPAM, URINE: NOT DETECTED
NORFENTANYL, URINE: NOT DETECTED
NORHYDROCODONE, URINE: NOT DETECTED
NOROXYCODONE, URINE: NOT DETECTED
NOROXYMORPHONE, URINE: NOT DETECTED
OXAZEPAM, URINE: NOT DETECTED
OXYCODONE URINE: NOT DETECTED
OXYMORPHONE, URINE: NOT DETECTED
PAIN MGT DRUG PANEL, HI RES, UR: NORMAL
PCP,URINE: NOT DETECTED
PHENTERMINE, UR: NOT DETECTED
PROPOXYPHENE, URINE: NOT DETECTED
TAPENTADOL, URINE: NOT DETECTED
TAPENTADOL-O-SULFATE, URINE: NOT DETECTED
TEMAZEPAM, URINE: NOT DETECTED
TRAMADOL, URINE: NOT DETECTED
ZOLPIDEM, URINE: NOT DETECTED

## 2017-07-10 DIAGNOSIS — I10 ESSENTIAL HYPERTENSION: ICD-10-CM

## 2017-07-14 ENCOUNTER — TELEPHONE (OUTPATIENT)
Dept: FAMILY MEDICINE CLINIC | Age: 62
End: 2017-07-14

## 2017-07-14 DIAGNOSIS — G47.00 INSOMNIA, UNSPECIFIED TYPE: Primary | ICD-10-CM

## 2017-07-17 RX ORDER — TRAZODONE HYDROCHLORIDE 50 MG/1
50 TABLET ORAL NIGHTLY
Qty: 30 TABLET | Refills: 1 | Status: ON HOLD | OUTPATIENT
Start: 2017-07-17 | End: 2018-01-22 | Stop reason: DRUGHIGH

## 2017-07-17 RX ORDER — PREGABALIN 100 MG/1
CAPSULE ORAL
Qty: 90 CAPSULE | Refills: 0 | OUTPATIENT
Start: 2017-07-17

## 2017-07-24 ENCOUNTER — OFFICE VISIT (OUTPATIENT)
Dept: FAMILY MEDICINE CLINIC | Age: 62
End: 2017-07-24
Payer: MEDICARE

## 2017-07-24 VITALS
HEIGHT: 72 IN | HEART RATE: 112 BPM | OXYGEN SATURATION: 98 % | TEMPERATURE: 97.5 F | SYSTOLIC BLOOD PRESSURE: 137 MMHG | WEIGHT: 175.04 LBS | BODY MASS INDEX: 23.71 KG/M2 | DIASTOLIC BLOOD PRESSURE: 76 MMHG

## 2017-07-24 DIAGNOSIS — M79.2 PERIPHERAL NEUROPATHIC PAIN: ICD-10-CM

## 2017-07-24 DIAGNOSIS — Z79.4 TYPE 2 DIABETES MELLITUS WITH DIABETIC POLYNEUROPATHY, WITH LONG-TERM CURRENT USE OF INSULIN (HCC): Primary | ICD-10-CM

## 2017-07-24 DIAGNOSIS — E11.42 TYPE 2 DIABETES MELLITUS WITH DIABETIC POLYNEUROPATHY, WITH LONG-TERM CURRENT USE OF INSULIN (HCC): Primary | ICD-10-CM

## 2017-07-24 DIAGNOSIS — K21.9 GASTROESOPHAGEAL REFLUX DISEASE, ESOPHAGITIS PRESENCE NOT SPECIFIED: ICD-10-CM

## 2017-07-24 LAB — HBA1C MFR BLD: 8.5 %

## 2017-07-24 PROCEDURE — 83036 HEMOGLOBIN GLYCOSYLATED A1C: CPT | Performed by: STUDENT IN AN ORGANIZED HEALTH CARE EDUCATION/TRAINING PROGRAM

## 2017-07-24 PROCEDURE — 99213 OFFICE O/P EST LOW 20 MIN: CPT | Performed by: STUDENT IN AN ORGANIZED HEALTH CARE EDUCATION/TRAINING PROGRAM

## 2017-07-24 PROCEDURE — 3017F COLORECTAL CA SCREEN DOC REV: CPT | Performed by: STUDENT IN AN ORGANIZED HEALTH CARE EDUCATION/TRAINING PROGRAM

## 2017-07-24 PROCEDURE — 4004F PT TOBACCO SCREEN RCVD TLK: CPT | Performed by: STUDENT IN AN ORGANIZED HEALTH CARE EDUCATION/TRAINING PROGRAM

## 2017-07-24 PROCEDURE — G8420 CALC BMI NORM PARAMETERS: HCPCS | Performed by: STUDENT IN AN ORGANIZED HEALTH CARE EDUCATION/TRAINING PROGRAM

## 2017-07-24 PROCEDURE — 3046F HEMOGLOBIN A1C LEVEL >9.0%: CPT | Performed by: STUDENT IN AN ORGANIZED HEALTH CARE EDUCATION/TRAINING PROGRAM

## 2017-07-24 PROCEDURE — 99213 OFFICE O/P EST LOW 20 MIN: CPT

## 2017-07-24 PROCEDURE — G8427 DOCREV CUR MEDS BY ELIG CLIN: HCPCS | Performed by: STUDENT IN AN ORGANIZED HEALTH CARE EDUCATION/TRAINING PROGRAM

## 2017-07-24 RX ORDER — OMEPRAZOLE 20 MG/1
20 CAPSULE, DELAYED RELEASE ORAL DAILY
Qty: 30 CAPSULE | Refills: 3 | Status: SHIPPED | OUTPATIENT
Start: 2017-07-24 | End: 2017-11-08 | Stop reason: ALTCHOICE

## 2017-07-24 RX ORDER — GABAPENTIN 100 MG/1
100 CAPSULE ORAL 3 TIMES DAILY
Qty: 90 CAPSULE | Refills: 2 | Status: SHIPPED | OUTPATIENT
Start: 2017-07-24 | End: 2017-10-27 | Stop reason: SDUPTHER

## 2017-07-24 ASSESSMENT — ENCOUNTER SYMPTOMS
DIARRHEA: 0
NAUSEA: 0
CONSTIPATION: 0
PHOTOPHOBIA: 0
BLOOD IN STOOL: 0

## 2017-07-24 ASSESSMENT — PATIENT HEALTH QUESTIONNAIRE - PHQ9
1. LITTLE INTEREST OR PLEASURE IN DOING THINGS: 0
SUM OF ALL RESPONSES TO PHQ9 QUESTIONS 1 & 2: 0
SUM OF ALL RESPONSES TO PHQ QUESTIONS 1-9: 0
2. FEELING DOWN, DEPRESSED OR HOPELESS: 0

## 2017-07-26 ENCOUNTER — HOSPITAL ENCOUNTER (OUTPATIENT)
Dept: PAIN MANAGEMENT | Age: 62
Discharge: HOME | End: 2017-07-26
Payer: MEDICARE

## 2017-07-26 VITALS
RESPIRATION RATE: 20 BRPM | DIASTOLIC BLOOD PRESSURE: 76 MMHG | WEIGHT: 175 LBS | HEIGHT: 72 IN | SYSTOLIC BLOOD PRESSURE: 129 MMHG | BODY MASS INDEX: 23.7 KG/M2 | TEMPERATURE: 97.4 F | HEART RATE: 81 BPM

## 2017-07-26 DIAGNOSIS — G89.4 CHRONIC PAIN SYNDROME: Primary | ICD-10-CM

## 2017-07-26 PROCEDURE — 99213 OFFICE O/P EST LOW 20 MIN: CPT

## 2017-07-26 PROCEDURE — 99214 OFFICE O/P EST MOD 30 MIN: CPT

## 2017-07-26 RX ORDER — OXYCODONE HYDROCHLORIDE 5 MG/1
5 TABLET ORAL EVERY 8 HOURS PRN
Qty: 21 TABLET | Refills: 0 | Status: SHIPPED | OUTPATIENT
Start: 2017-07-31 | End: 2017-08-04 | Stop reason: SDUPTHER

## 2017-07-26 ASSESSMENT — PAIN DESCRIPTION - PAIN TYPE: TYPE: CHRONIC PAIN

## 2017-07-26 ASSESSMENT — PAIN SCALES - GENERAL: PAINLEVEL_OUTOF10: 6

## 2017-07-26 ASSESSMENT — ENCOUNTER SYMPTOMS
CONSTIPATION: 0
COUGH: 0
SHORTNESS OF BREATH: 0

## 2017-07-26 ASSESSMENT — PAIN DESCRIPTION - ONSET: ONSET: ON-GOING

## 2017-07-26 ASSESSMENT — PAIN DESCRIPTION - FREQUENCY: FREQUENCY: CONTINUOUS

## 2017-07-26 ASSESSMENT — PAIN DESCRIPTION - ORIENTATION: ORIENTATION: RIGHT;LEFT

## 2017-07-26 ASSESSMENT — PAIN DESCRIPTION - PROGRESSION: CLINICAL_PROGRESSION: NOT CHANGED

## 2017-08-07 ENCOUNTER — OFFICE VISIT (OUTPATIENT)
Dept: PODIATRY | Age: 62
End: 2017-08-07
Payer: MEDICARE

## 2017-08-07 VITALS
BODY MASS INDEX: 23.71 KG/M2 | DIASTOLIC BLOOD PRESSURE: 83 MMHG | HEART RATE: 93 BPM | HEIGHT: 72 IN | SYSTOLIC BLOOD PRESSURE: 138 MMHG | WEIGHT: 175.04 LBS

## 2017-08-07 DIAGNOSIS — B35.1 ONYCHOMYCOSIS: ICD-10-CM

## 2017-08-07 DIAGNOSIS — E11.52 TYPE 2 DIABETES MELLITUS WITH DIABETIC PERIPHERAL ANGIOPATHY WITH GANGRENE, UNSPECIFIED LONG TERM INSULIN USE STATUS: ICD-10-CM

## 2017-08-07 DIAGNOSIS — I73.9 PVD (PERIPHERAL VASCULAR DISEASE) (HCC): Primary | ICD-10-CM

## 2017-08-07 DIAGNOSIS — E11.42 DIABETIC POLYNEUROPATHY ASSOCIATED WITH TYPE 2 DIABETES MELLITUS (HCC): ICD-10-CM

## 2017-08-07 PROCEDURE — G8420 CALC BMI NORM PARAMETERS: HCPCS | Performed by: PODIATRIST

## 2017-08-07 PROCEDURE — 3017F COLORECTAL CA SCREEN DOC REV: CPT | Performed by: PODIATRIST

## 2017-08-07 PROCEDURE — 4004F PT TOBACCO SCREEN RCVD TLK: CPT | Performed by: PODIATRIST

## 2017-08-07 PROCEDURE — 11720 DEBRIDE NAIL 1-5: CPT | Performed by: PODIATRIST

## 2017-08-07 PROCEDURE — 99204 OFFICE O/P NEW MOD 45 MIN: CPT

## 2017-08-07 PROCEDURE — 11721 DEBRIDE NAIL 6 OR MORE: CPT | Performed by: PODIATRIST

## 2017-08-07 PROCEDURE — G8427 DOCREV CUR MEDS BY ELIG CLIN: HCPCS | Performed by: PODIATRIST

## 2017-08-07 PROCEDURE — 3046F HEMOGLOBIN A1C LEVEL >9.0%: CPT | Performed by: PODIATRIST

## 2017-08-07 PROCEDURE — 99203 OFFICE O/P NEW LOW 30 MIN: CPT | Performed by: PODIATRIST

## 2017-08-13 ENCOUNTER — HOSPITAL ENCOUNTER (EMERGENCY)
Age: 62
Discharge: HOME | End: 2017-08-14
Attending: EMERGENCY MEDICINE
Payer: MEDICARE

## 2017-08-13 VITALS
SYSTOLIC BLOOD PRESSURE: 149 MMHG | HEART RATE: 82 BPM | TEMPERATURE: 97.8 F | BODY MASS INDEX: 23.7 KG/M2 | WEIGHT: 175 LBS | OXYGEN SATURATION: 94 % | DIASTOLIC BLOOD PRESSURE: 91 MMHG | RESPIRATION RATE: 18 BRPM | HEIGHT: 72 IN

## 2017-08-13 DIAGNOSIS — F10.920 ACUTE ALCOHOLIC INTOXICATION, UNCOMPLICATED (HCC): Primary | ICD-10-CM

## 2017-08-13 PROCEDURE — 99284 EMERGENCY DEPT VISIT MOD MDM: CPT

## 2017-08-13 ASSESSMENT — ENCOUNTER SYMPTOMS
SHORTNESS OF BREATH: 0
NAUSEA: 0
VOMITING: 0
ABDOMINAL PAIN: 0

## 2017-08-13 ASSESSMENT — PAIN DESCRIPTION - LOCATION: LOCATION: LEG

## 2017-08-13 ASSESSMENT — PAIN DESCRIPTION - PAIN TYPE: TYPE: CHRONIC PAIN

## 2017-08-15 ENCOUNTER — CARE COORDINATION (OUTPATIENT)
Dept: CARE COORDINATION | Age: 62
End: 2017-08-15

## 2017-08-22 ENCOUNTER — HOSPITAL ENCOUNTER (OUTPATIENT)
Dept: PAIN MANAGEMENT | Age: 62
Discharge: HOME | End: 2017-08-22
Payer: MEDICARE

## 2017-08-22 VITALS
HEART RATE: 76 BPM | SYSTOLIC BLOOD PRESSURE: 112 MMHG | DIASTOLIC BLOOD PRESSURE: 64 MMHG | TEMPERATURE: 97.8 F | RESPIRATION RATE: 16 BRPM

## 2017-08-22 DIAGNOSIS — M79.2 NEUROPATHIC PAIN, LEG, RIGHT: Primary | ICD-10-CM

## 2017-08-22 PROCEDURE — 99213 OFFICE O/P EST LOW 20 MIN: CPT

## 2017-08-22 PROCEDURE — 99214 OFFICE O/P EST MOD 30 MIN: CPT

## 2017-08-22 ASSESSMENT — PAIN DESCRIPTION - ORIENTATION: ORIENTATION: RIGHT;LEFT

## 2017-08-22 ASSESSMENT — ENCOUNTER SYMPTOMS
SHORTNESS OF BREATH: 0
BACK PAIN: 1
CONSTIPATION: 0
COUGH: 0

## 2017-08-22 ASSESSMENT — PAIN SCALES - GENERAL: PAINLEVEL_OUTOF10: 7

## 2017-08-22 ASSESSMENT — PAIN DESCRIPTION - ONSET: ONSET: ON-GOING

## 2017-08-22 ASSESSMENT — PAIN DESCRIPTION - PAIN TYPE: TYPE: CHRONIC PAIN

## 2017-08-22 ASSESSMENT — PAIN DESCRIPTION - FREQUENCY: FREQUENCY: CONTINUOUS

## 2017-08-22 ASSESSMENT — PAIN DESCRIPTION - PROGRESSION: CLINICAL_PROGRESSION: GRADUALLY WORSENING

## 2017-08-22 ASSESSMENT — PAIN DESCRIPTION - LOCATION: LOCATION: FOOT

## 2017-09-13 ENCOUNTER — TELEPHONE (OUTPATIENT)
Dept: PHARMACY | Facility: CLINIC | Age: 62
End: 2017-09-13

## 2017-09-18 RX ORDER — FERROUS SULFATE 325(65) MG
TABLET ORAL
Qty: 60 TABLET | Refills: 0 | OUTPATIENT
Start: 2017-09-18

## 2017-10-10 ENCOUNTER — HOSPITAL ENCOUNTER (EMERGENCY)
Age: 62
Discharge: HOME | End: 2017-10-11
Attending: EMERGENCY MEDICINE
Payer: MEDICARE

## 2017-10-10 VITALS
RESPIRATION RATE: 16 BRPM | DIASTOLIC BLOOD PRESSURE: 77 MMHG | SYSTOLIC BLOOD PRESSURE: 129 MMHG | HEART RATE: 85 BPM | OXYGEN SATURATION: 96 % | TEMPERATURE: 96.1 F

## 2017-10-10 DIAGNOSIS — F10.920 ACUTE ALCOHOLIC INTOXICATION, UNCOMPLICATED (HCC): Primary | ICD-10-CM

## 2017-10-10 LAB
ETHANOL PERCENT: 0.27 %
ETHANOL: 266 MG/DL

## 2017-10-10 PROCEDURE — G0480 DRUG TEST DEF 1-7 CLASSES: HCPCS

## 2017-10-10 PROCEDURE — 99284 EMERGENCY DEPT VISIT MOD MDM: CPT

## 2017-10-11 PROCEDURE — 6370000000 HC RX 637 (ALT 250 FOR IP): Performed by: EMERGENCY MEDICINE

## 2017-10-11 PROCEDURE — 6360000002 HC RX W HCPCS: Performed by: EMERGENCY MEDICINE

## 2017-10-11 RX ORDER — ONDANSETRON 4 MG/1
4 TABLET, FILM COATED ORAL ONCE
Status: COMPLETED | OUTPATIENT
Start: 2017-10-11 | End: 2017-10-11

## 2017-10-11 RX ORDER — PANTOPRAZOLE SODIUM 40 MG/1
40 TABLET, DELAYED RELEASE ORAL ONCE
Status: COMPLETED | OUTPATIENT
Start: 2017-10-11 | End: 2017-10-11

## 2017-10-11 RX ORDER — MAGNESIUM HYDROXIDE/ALUMINUM HYDROXICE/SIMETHICONE 120; 1200; 1200 MG/30ML; MG/30ML; MG/30ML
30 SUSPENSION ORAL
Status: COMPLETED | OUTPATIENT
Start: 2017-10-11 | End: 2017-10-11

## 2017-10-11 RX ADMIN — ALUMINUM HYDROXIDE, MAGNESIUM HYDROXIDE, AND SIMETHICONE 30 ML: 200; 200; 20 SUSPENSION ORAL at 02:30

## 2017-10-11 RX ADMIN — ONDANSETRON 4 MG: 4 TABLET, FILM COATED ORAL at 02:30

## 2017-10-11 RX ADMIN — PANTOPRAZOLE SODIUM 40 MG: 40 TABLET, DELAYED RELEASE ORAL at 02:53

## 2017-10-11 ASSESSMENT — ENCOUNTER SYMPTOMS
VOMITING: 0
SHORTNESS OF BREATH: 0
RHINORRHEA: 0
ABDOMINAL PAIN: 0
COUGH: 0
NAUSEA: 0
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
## 2017-10-11 NOTE — ED NOTES
Resting on stretcher, eyes closed, RR even and non-labored. Will continue to monitor.        Moses Frank RN  10/11/17 0057

## 2017-10-11 NOTE — ED NOTES
Bed:  04A  Expected date:   Expected time:   Means of arrival:   Comments:  55 Kunal Cabrera RN  10/10/17 2101

## 2017-10-11 NOTE — ED NOTES
Report taken from [facility]. Pt resting comfortably on the stretcher at this time, requesting to have something to eat.       [address]ajal, RN  10/10/17 4399

## 2017-10-11 NOTE — ED PROVIDER NOTES
dry  Extremities: no clubbing, cyanosis, edema    IMPRESSION:   Alcohol intoxication    PLAN:   Labs, monitor.   Before Discharge: AOX3, ambulate with steady gait with no new complaints, sober (BAL < 0.8) or safe ride       CRITICAL CARE TIME:    None    Bar Salcido MD, [facility]  Attending Emergency  Physician    (Please note that portions of this note were completed with a voice recognition program.  Efforts were made to edit the dictations but occasionally words are mis-transcribed.)        Bar Salcido MD  10/10/17 9112

## 2017-10-12 ENCOUNTER — CARE COORDINATION (OUTPATIENT)
Dept: CARE COORDINATION | Age: 62
End: 2017-10-12

## 2017-10-12 NOTE — CARE COORDINATION
Ambulatory Care Coordination ED Follow up Call   Reason for ED Visit:  Acute alcohol intoxication  Care Management Risk Score: CMRS 14  How are you feeling? :     improved  Patient Reports the following:  none             Contact RNCC regarding any worsening symptoms from above. Did you call your PCP prior to going to the ED? No          Post Discharge Status:  What health concerns since you left the Emergency Room?  nopne    Do you have wounds that you are caring for at home? No    Do you have a follow up appt scheduled?  no - he will make an appt if needed. Pt states he is better today. Review of Instructions:                                 Do you have any questions regarding your discharge instructions?:  No  Medications:    What questions do you have about your medications? No  Are you taking your medications as directed? If not - why? Yes   Can you afford your medications? yes  ADLS:  Do you need assistance of any kind at home? N/A   What assistance is needed?  n/a      FU appts/Provider:    Future Appointments  Date Time Provider Jordon Frost   12/11/2017 1:15 PM [facility], [facility] [address] Maintenance Due   Topic Date Due    Hepatitis C screen  1955    HIV screen  10/10/1972    Diabetic retinal exam  03/18/2016    Colon cancer screen colonoscopy  04/26/2017    Diabetic microalbuminuria test  08/18/2017    Flu vaccine (1) 09/01/2017    Zostavax vaccine  10/10/2017     Patient advised to contact PCP office to have HM items/records faxed to PCP Office directly? N/A      As a reminder, writer explained the importance of first calling their PCP to get an appointment instead going the ER especially Monday- Friday during office hours for non-emergency problems. Writer advised the patient to speak to a nurse or MOA to assist them with the issue and maybe get them in as a same day/sick call before they decide to go to the ER.  Writer also stressed that they can

## 2017-10-27 ENCOUNTER — OFFICE VISIT (OUTPATIENT)
Dept: FAMILY MEDICINE CLINIC | Age: 62
End: 2017-10-27
Payer: MEDICARE

## 2017-10-27 VITALS
WEIGHT: 177.4 LBS | DIASTOLIC BLOOD PRESSURE: 78 MMHG | HEIGHT: 72 IN | BODY MASS INDEX: 24.03 KG/M2 | SYSTOLIC BLOOD PRESSURE: 128 MMHG | TEMPERATURE: 97.6 F | HEART RATE: 100 BPM

## 2017-10-27 DIAGNOSIS — E11.42 TYPE 2 DIABETES MELLITUS WITH DIABETIC POLYNEUROPATHY, WITH LONG-TERM CURRENT USE OF INSULIN (HCC): ICD-10-CM

## 2017-10-27 DIAGNOSIS — Z79.4 TYPE 2 DIABETES MELLITUS WITH DIABETIC POLYNEUROPATHY, WITH LONG-TERM CURRENT USE OF INSULIN (HCC): ICD-10-CM

## 2017-10-27 DIAGNOSIS — M79.2 PERIPHERAL NEUROPATHIC PAIN: ICD-10-CM

## 2017-10-27 DIAGNOSIS — R42 DIZZINESS: Primary | ICD-10-CM

## 2017-10-27 DIAGNOSIS — I73.9 PAD (PERIPHERAL ARTERY DISEASE) (HCC): ICD-10-CM

## 2017-10-27 LAB — HBA1C MFR BLD: 7.2 %

## 2017-10-27 PROCEDURE — 99213 OFFICE O/P EST LOW 20 MIN: CPT

## 2017-10-27 PROCEDURE — 83036 HEMOGLOBIN GLYCOSYLATED A1C: CPT | Performed by: STUDENT IN AN ORGANIZED HEALTH CARE EDUCATION/TRAINING PROGRAM

## 2017-10-27 PROCEDURE — 99213 OFFICE O/P EST LOW 20 MIN: CPT | Performed by: STUDENT IN AN ORGANIZED HEALTH CARE EDUCATION/TRAINING PROGRAM

## 2017-10-27 RX ORDER — ASPIRIN 81 MG/1
81 TABLET ORAL DAILY
Qty: 30 TABLET | Refills: 3 | Status: SHIPPED | OUTPATIENT
Start: 2017-10-27 | End: 2018-09-12 | Stop reason: ALTCHOICE

## 2017-10-27 RX ORDER — CLOPIDOGREL BISULFATE 75 MG/1
75 TABLET ORAL DAILY
Qty: 30 TABLET | Refills: 2 | Status: SHIPPED | OUTPATIENT
Start: 2017-10-27 | End: 2018-08-15 | Stop reason: ALTCHOICE

## 2017-10-27 RX ORDER — GABAPENTIN 100 MG/1
100 CAPSULE ORAL 3 TIMES DAILY
Qty: 90 CAPSULE | Refills: 2 | Status: ON HOLD | OUTPATIENT
Start: 2017-10-27 | End: 2018-01-22 | Stop reason: ALTCHOICE

## 2017-10-27 ASSESSMENT — ENCOUNTER SYMPTOMS
NAUSEA: 0
DIARRHEA: 0
VOMITING: 0
COUGH: 0
CONSTIPATION: 0
SHORTNESS OF BREATH: 0

## 2017-10-27 NOTE — PROGRESS NOTES
vitals reviewed. /78 (Site: Right Arm, Position: Standing, Cuff Size: Large Adult) Comment: after 3 minutes  Pulse 100   Temp 97.6 °F (36.4 °C) (Temporal)   Ht 6' 0.01\" (1.829 m)   Wt 177 lb 6.4 oz (80.5 kg)   BMI 24.05 kg/m²       Orthostatics normal    Assessment:      1. Dizziness  Orthostatics check was normal. Likely from his etoh abuse, but I am concerned for his gait disturbance and persistent nature of his dizziness that there might be an underlying cerebellar involvement. Unlikely BPPV. May also be due to his etoh intake on top of gabapentin use, though his gabapentin is low dose. Will defer to neurology for their opinion.    - Eugenio Beltran MD, Neurology [facility]*    2. Peripheral neuropathic pain  Cont same low dose for now. Will appreciate neuro input  - gabapentin (NEURONTIN) 100 MG capsule; Take 1 capsule by mouth 3 times daily  Dispense: 90 capsule; Refill: 2    3. Type 2 diabetes mellitus with diabetic polyneuropathy, with long-term current use of insulin ([facility])  A1C 7.2 today. Controlled. - POCT glycosylated hemoglobin (Hb A1C)    4. PAD (peripheral artery disease) ([facility]  Counseled pt to resume meds. - clopidogrel (PLAVIX) 75 MG tablet; Take 1 tablet by mouth daily  Dispense: 30 tablet; Refill: 2  - aspirin EC 81 MG EC tablet; Take 1 tablet by mouth daily  Dispense: 30 tablet; Refill: 3    1. Lona Leiva received counseling on the following healthy behaviors: medication adherence  2. Reviewed prior labs and health maintenance  3. Continue current medications, diet and exercise. 4.  Discussed use, benefit, and side effects of prescribed medications. Barriers to medication compliance addressed. All patient questions answered. Pt voiced understanding. 5.  Patient given educational materials - see patient instructions  6. Was a self-tracking handout given in paper form or via [employer]? No  If yes, see orders or list here.     PHQ Scores 7/24/2017   PHQ2 Score 0   PHQ9 Score 0 Interpretation of Total Score Depression Severity: 1-4 = Minimal depression, 5-9 = Mild depression, 10-14 = Moderate depression, 15-19 = Moderately severe depression, 20-27 = Severe depression  Normal    Completed Refills   Requested Prescriptions     Signed Prescriptions Disp Refills    gabapentin (NEURONTIN) 100 MG capsule 90 capsule 2     Sig: Take 1 capsule by mouth 3 times daily    clopidogrel (PLAVIX) 75 MG tablet 30 tablet 2     Sig: Take 1 tablet by mouth daily    aspirin EC 81 MG EC tablet 30 tablet 3     Sig: Take 1 tablet by mouth daily       Return in about 6 weeks (around 12/8/2017) for dizziness, DM.

## 2017-10-27 NOTE — PROGRESS NOTES
Visit Information    Have you changed or started any medications since your last visit including any over-the-counter medicines, vitamins, or herbal medicines? no   Have you stopped taking any of your medications? Is so, why? -  no  Are you having any side effects from any of your medications? - no    Have you seen any other physician or provider since your last visit?  no   Have you had any other diagnostic tests since your last visit?  no   Have you been seen in the emergency room and/or had an admission in a hospital since we last saw you?  yes - Dea Gonzalez   Have you had your routine dental cleaning in the past 6 months?  no     Do you have an active MyChart account? If no, what is the barrier?   Yes    Patient Care Team:  Eddi Plata MD as PCP - General (Family Medicine)  Yennifer Newman MD as Surgeon (Surgical Oncology)  Joan Crews DPM as Surgeon (Podiatry)  Amber Dover MD as Consulting Physician (Ophthalmology)  Errol Bumpers, MD as Consulting Physician (Gastroenterology)    Medical History Review  Past Medical, Family, and Social History reviewed and does not contribute to the patient presenting condition    Health Maintenance   Topic Date Due    Hepatitis C screen  1955    HIV screen  10/10/1972    Diabetic retinal exam  03/18/2016    Colon cancer screen colonoscopy  04/26/2017    Diabetic microalbuminuria test  08/18/2017    Flu vaccine (1) 09/01/2017    Zostavax vaccine  10/10/2017    DTaP/Tdap/Td vaccine (1 - Tdap) 10/10/2026 (Originally 10/10/1976)    Lipid screen  01/25/2018    Diabetic foot exam  07/24/2018    Diabetic hemoglobin A1C test  07/24/2018    Pneumococcal med risk  Completed

## 2017-10-27 NOTE — PATIENT INSTRUCTIONS
Thank you for letting us take care of you today. We hope all your questions were addressed. If a question was overlooked or something else comes to mind after you return home, please contact a member of your Care Team listed below. Please make sure you have a routine office visit set up to follow-up on [address]. Your Care Team at [place] [address] is Team #3  Siobhan Mendoza MD (Faculty)  Vianey Prado MD (Faculty  Pendianne Barnett MD (Resident)  Rosita Hull MD (Resident)  Chante Ash MD (Resident)  Frederick Patterson MD (Resident)  Evelia Sullivan, ALONSO Ervin, DIXIE Simmons, DIXIE Can ([facility])  Lucia Irwin RN, ([address] [place] )  Trev Cornelius, Ph.D., (Behavioral Services)  Azar Mathews, [facility] (Clinical Pharmacist)     Office phone number: [phone]    If you need to get in right away due to illness, please be advised we have \"Same Day\" appointments available Monday-Friday. Please call us at [phone] option #1 to schedule your \"Same Day\" appointment.

## 2017-10-27 NOTE — PROGRESS NOTES
Attending Physician Statement  I have discussed the care of Courtney Kam, including pertinent history and exam findings,  with the resident. I have reviewed the key elements of all parts of the encounter with the resident. I agree with the assessment, plan and orders as documented by the resident.   (GE Modifier)

## 2017-11-07 DIAGNOSIS — K21.9 GASTROESOPHAGEAL REFLUX DISEASE, ESOPHAGITIS PRESENCE NOT SPECIFIED: ICD-10-CM

## 2017-11-08 RX ORDER — ESOMEPRAZOLE MAGNESIUM 40 MG/1
CAPSULE, DELAYED RELEASE ORAL
Qty: 180 CAPSULE | Refills: 0 | Status: SHIPPED | OUTPATIENT
Start: 2017-11-08 | End: 2018-02-01 | Stop reason: SDUPTHER

## 2017-11-08 NOTE — TELEPHONE ENCOUNTER
Please address the medication refill and close the encounter. If I can be of assistance, please route to the applicable pool. Thank you. Next Visit Date:  Future Appointments  Date Time Provider Jordon Frost   12/11/2017 1:15 PM Cristhian Ibarra DPM [facility] Podiatry [id number]   12/14/2017 8:45 AM Shelly Henderson MD [address] [place] [address] Maintenance   Topic Date Due    Hepatitis C screen  1955    HIV screen  10/10/1972    Diabetic retinal exam  03/18/2016    Colon cancer screen colonoscopy  04/26/2017    Diabetic microalbuminuria test  08/18/2017    Flu vaccine (1) 09/01/2017    Zostavax vaccine  10/10/2017    DTaP/Tdap/Td vaccine (1 - Tdap) 10/10/2026 (Originally 10/10/1976)    Lipid screen  01/25/2018    Diabetic foot exam  07/24/2018    Diabetic hemoglobin A1C test  10/27/2018    Pneumococcal med risk  Completed       Hemoglobin A1C (%)   Date Value   10/27/2017 7.2   07/24/2017 8.5   04/21/2017 10.8 (H)             ( goal A1C is < 7)   Microalb/Crt.  Ratio (mcg/mg creat)   Date Value   08/18/2016 370 (H)     LDL Cholesterol (mg/dL)   Date Value   01/25/2017 31       (goal LDL is <100)   AST (U/L)   Date Value   04/21/2017 9     ALT (U/L)   Date Value   04/21/2017 14     BUN (mg/dL)   Date Value   04/21/2017 22 (H)     BP Readings from Last 3 Encounters:   10/27/17 128/78   10/10/17 129/77   08/22/17 112/64          (goal 120/80)    All Future Testing planned in [facility]  Lab Frequency Next Occurrence               Patient Active Problem List:     Type 2 diabetes mellitus with diabetic polyneuropathy, with long-term current use of insulin (HCC)     Neuropathic pain, leg     HLD (hyperlipidemia)     History of cancer     Diabetic polyneuropathy (HCC)     GERD (gastroesophageal reflux disease)     Allergic rhinitis     Tobacco abuse     COPD (chronic obstructive pulmonary disease) (HCC)     Edentulous     Dysphagia     Lung nodules     Esophageal cancer (HCC)     Fracture of humerus, left, closed     Closed fracture of humerus     DM type 2 with diabetic peripheral neuropathy (HCC)     Chronic obstructive pulmonary disease (HCC)     Hyperlipidemia     Gastroesophageal reflux disease     Chronic pain syndrome     Marijuana use     Closed displaced fracture of surgical neck of left humerus with routine healing     History of colon polyps     Infected open wound     Open wound of foot excluding toes     Cellulitis of right heel     Gangrene of lower extremity (HCC)     PVD (peripheral vascular disease) (HCC)     Cellulitis of right lower extremity     Functional diarrhea     Sepsis due to methicillin resistant Staphylococcus aureus (MRSA) ([address]     Diabetic ketoacidosis without coma associated with type 2 diabetes mellitus ([address]     Acute osteomyelitis of right calcaneus (HCC)     Benign essential HTN     History of esophageal cancer     Gangrene of foot (HCC)     Osteomyelitis, chronic, ankle or foot ([address]     PAD (peripheral artery disease) ([address]

## 2017-11-21 ENCOUNTER — TELEPHONE (OUTPATIENT)
Dept: ADMINISTRATIVE | Age: 62
End: 2017-11-21

## 2017-12-20 ENCOUNTER — APPOINTMENT (OUTPATIENT)
Dept: GENERAL RADIOLOGY | Age: 62
DRG: 175 | End: 2017-12-20
Payer: MEDICARE

## 2017-12-20 ENCOUNTER — HOSPITAL ENCOUNTER (INPATIENT)
Age: 62
LOS: 4 days | Discharge: HOME | DRG: 175 | End: 2017-12-24
Attending: EMERGENCY MEDICINE | Admitting: INTERNAL MEDICINE
Payer: MEDICARE

## 2017-12-20 ENCOUNTER — APPOINTMENT (OUTPATIENT)
Dept: CT IMAGING | Age: 62
DRG: 175 | End: 2017-12-20
Payer: MEDICARE

## 2017-12-20 DIAGNOSIS — Z85.01 HISTORY OF ESOPHAGEAL CANCER: ICD-10-CM

## 2017-12-20 DIAGNOSIS — J18.9 PNEUMONIA DUE TO ORGANISM: ICD-10-CM

## 2017-12-20 DIAGNOSIS — I26.99 OTHER PULMONARY EMBOLISM WITHOUT ACUTE COR PULMONALE, UNSPECIFIED CHRONICITY (HCC): Primary | ICD-10-CM

## 2017-12-20 DIAGNOSIS — L03.115 CELLULITIS OF RIGHT HEEL: ICD-10-CM

## 2017-12-20 DIAGNOSIS — T17.800A ASPIRATION INTO LOWER RESPIRATORY TRACT, INITIAL ENCOUNTER: ICD-10-CM

## 2017-12-20 DIAGNOSIS — J90 PLEURAL EFFUSION: ICD-10-CM

## 2017-12-20 DIAGNOSIS — R79.89 POSITIVE D DIMER: ICD-10-CM

## 2017-12-20 LAB
% CKMB: 2.9 % (ref 0–3.5)
ABSOLUTE EOS #: 0.4 K/UL (ref 0–0.4)
ABSOLUTE IMMATURE GRANULOCYTE: ABNORMAL K/UL (ref 0–0.3)
ABSOLUTE LYMPH #: 1.6 K/UL (ref 1–4.8)
ABSOLUTE MONO #: 1.2 K/UL (ref 0.2–0.8)
ACETAMINOPHEN LEVEL: <10 UG/ML (ref 10–30)
ANION GAP SERPL CALCULATED.3IONS-SCNC: 15 MMOL/L (ref 9–17)
BASOPHILS # BLD: 1 % (ref 0–2)
BASOPHILS ABSOLUTE: 0.2 K/UL (ref 0–0.2)
BNP INTERPRETATION: NORMAL
BUN BLDV-MCNC: 22 MG/DL (ref 8–23)
BUN/CREAT BLD: 20 (ref 9–20)
CALCIUM SERPL-MCNC: 8.8 MG/DL (ref 8.6–10.4)
CHLORIDE BLD-SCNC: 94 MMOL/L (ref 98–107)
CK MB: 1.3 NG/ML
CKMB INTERPRETATION: NORMAL
CO2: 24 MMOL/L (ref 20–31)
CREAT SERPL-MCNC: 1.11 MG/DL (ref 0.7–1.2)
D-DIMER QUANTITATIVE: 1.63 MG/L FEU
DIFFERENTIAL TYPE: ABNORMAL
EKG ATRIAL RATE: 89 BPM
EKG P AXIS: 40 DEGREES
EKG P-R INTERVAL: 124 MS
EKG Q-T INTERVAL: 348 MS
EKG QRS DURATION: 80 MS
EKG QTC CALCULATION (BAZETT): 423 MS
EKG R AXIS: 42 DEGREES
EKG T AXIS: 52 DEGREES
EKG VENTRICULAR RATE: 89 BPM
EOSINOPHILS RELATIVE PERCENT: 3 % (ref 1–4)
ETHANOL PERCENT: <0.01 %
ETHANOL: <10 MG/DL
GFR AFRICAN AMERICAN: >60 ML/MIN
GFR NON-AFRICAN AMERICAN: >60 ML/MIN
GFR SERPL CREATININE-BSD FRML MDRD: ABNORMAL ML/MIN/{1.73_M2}
GFR SERPL CREATININE-BSD FRML MDRD: ABNORMAL ML/MIN/{1.73_M2}
GLUCOSE BLD-MCNC: 149 MG/DL (ref 70–99)
HCT VFR BLD CALC: 39.6 % (ref 41–53)
HEMOGLOBIN: 12.7 G/DL (ref 13.5–17.5)
IMMATURE GRANULOCYTES: ABNORMAL %
INR BLD: 1
LACTIC ACID: 0.9 MMOL/L (ref 0.5–2.2)
LYMPHOCYTES # BLD: 14 % (ref 24–44)
MAGNESIUM: 2 MG/DL (ref 1.6–2.6)
MCH RBC QN AUTO: 28.3 PG (ref 26–34)
MCHC RBC AUTO-ENTMCNC: 32 G/DL (ref 31–37)
MCV RBC AUTO: 88.6 FL (ref 80–100)
MONOCYTES # BLD: 10 % (ref 1–7)
MYOGLOBIN: 62 NG/ML (ref 28–72)
PARTIAL THROMBOPLASTIN TIME: 29.6 SEC (ref 23–31)
PDW BLD-RTO: 13.8 % (ref 11.5–14.5)
PLATELET # BLD: 552 K/UL (ref 130–400)
PLATELET ESTIMATE: ABNORMAL
PMV BLD AUTO: ABNORMAL FL (ref 6–12)
POTASSIUM SERPL-SCNC: 3.7 MMOL/L (ref 3.7–5.3)
PRO-BNP: 177 PG/ML
PROTHROMBIN TIME: 9.9 SEC (ref 9.7–11.6)
RBC # BLD: 4.47 M/UL (ref 4.5–5.9)
RBC # BLD: ABNORMAL 10*6/UL
SALICYLATE LEVEL: <1 MG/DL (ref 3–10)
SEG NEUTROPHILS: 72 % (ref 36–66)
SEGMENTED NEUTROPHILS ABSOLUTE COUNT: 8.2 K/UL (ref 1.8–7.7)
SODIUM BLD-SCNC: 133 MMOL/L (ref 135–144)
TOTAL CK: 45 U/L (ref 39–308)
TOXIC TRICYCLIC SC,BLOOD: NEGATIVE
TROPONIN INTERP: NORMAL
TROPONIN T: <0.03 NG/ML
WBC # BLD: 11.6 K/UL (ref 3.5–11)
WBC # BLD: ABNORMAL 10*3/UL

## 2017-12-20 PROCEDURE — 84484 ASSAY OF TROPONIN QUANT: CPT

## 2017-12-20 PROCEDURE — 6360000004 HC RX CONTRAST MEDICATION: Performed by: EMERGENCY MEDICINE

## 2017-12-20 PROCEDURE — 96365 THER/PROPH/DIAG IV INF INIT: CPT

## 2017-12-20 PROCEDURE — 80048 BASIC METABOLIC PNL TOTAL CA: CPT

## 2017-12-20 PROCEDURE — G0480 DRUG TEST DEF 1-7 CLASSES: HCPCS

## 2017-12-20 PROCEDURE — 2000000000 HC ICU R&B

## 2017-12-20 PROCEDURE — 87070 CULTURE OTHR SPECIMN AEROBIC: CPT

## 2017-12-20 PROCEDURE — 96375 TX/PRO/DX INJ NEW DRUG ADDON: CPT

## 2017-12-20 PROCEDURE — 93005 ELECTROCARDIOGRAM TRACING: CPT

## 2017-12-20 PROCEDURE — 71020 XR CHEST STANDARD TWO VW: CPT

## 2017-12-20 PROCEDURE — 94640 AIRWAY INHALATION TREATMENT: CPT

## 2017-12-20 PROCEDURE — 87205 SMEAR GRAM STAIN: CPT

## 2017-12-20 PROCEDURE — 85610 PROTHROMBIN TIME: CPT

## 2017-12-20 PROCEDURE — 80307 DRUG TEST PRSMV CHEM ANLYZR: CPT

## 2017-12-20 PROCEDURE — 2500000003 HC RX 250 WO HCPCS: Performed by: EMERGENCY MEDICINE

## 2017-12-20 PROCEDURE — 6360000002 HC RX W HCPCS: Performed by: EMERGENCY MEDICINE

## 2017-12-20 PROCEDURE — 71260 CT THORAX DX C+: CPT

## 2017-12-20 PROCEDURE — 83735 ASSAY OF MAGNESIUM: CPT

## 2017-12-20 PROCEDURE — 82550 ASSAY OF CK (CPK): CPT

## 2017-12-20 PROCEDURE — 85025 COMPLETE CBC W/AUTO DIFF WBC: CPT

## 2017-12-20 PROCEDURE — 2580000003 HC RX 258: Performed by: EMERGENCY MEDICINE

## 2017-12-20 PROCEDURE — 82553 CREATINE MB FRACTION: CPT

## 2017-12-20 PROCEDURE — 87040 BLOOD CULTURE FOR BACTERIA: CPT

## 2017-12-20 PROCEDURE — 83874 ASSAY OF MYOGLOBIN: CPT

## 2017-12-20 PROCEDURE — 99285 EMERGENCY DEPT VISIT HI MDM: CPT

## 2017-12-20 PROCEDURE — 85379 FIBRIN DEGRADATION QUANT: CPT

## 2017-12-20 PROCEDURE — 85730 THROMBOPLASTIN TIME PARTIAL: CPT

## 2017-12-20 PROCEDURE — C9113 INJ PANTOPRAZOLE SODIUM, VIA: HCPCS | Performed by: EMERGENCY MEDICINE

## 2017-12-20 PROCEDURE — 83880 ASSAY OF NATRIURETIC PEPTIDE: CPT

## 2017-12-20 PROCEDURE — 83605 ASSAY OF LACTIC ACID: CPT

## 2017-12-20 RX ORDER — DEXTROSE MONOHYDRATE 25 G/50ML
12.5 INJECTION, SOLUTION INTRAVENOUS PRN
Status: DISCONTINUED | OUTPATIENT
Start: 2017-12-20 | End: 2017-12-24 | Stop reason: HOSPADM

## 2017-12-20 RX ORDER — NICOTINE 21 MG/24HR
1 PATCH, TRANSDERMAL 24 HOURS TRANSDERMAL DAILY PRN
Status: DISCONTINUED | OUTPATIENT
Start: 2017-12-20 | End: 2017-12-24 | Stop reason: HOSPADM

## 2017-12-20 RX ORDER — HYDROCODONE BITARTRATE AND ACETAMINOPHEN 5; 325 MG/1; MG/1
2 TABLET ORAL EVERY 4 HOURS PRN
Status: DISCONTINUED | OUTPATIENT
Start: 2017-12-20 | End: 2017-12-24 | Stop reason: HOSPADM

## 2017-12-20 RX ORDER — PANTOPRAZOLE SODIUM 40 MG/10ML
40 INJECTION, POWDER, LYOPHILIZED, FOR SOLUTION INTRAVENOUS ONCE
Status: COMPLETED | OUTPATIENT
Start: 2017-12-20 | End: 2017-12-20

## 2017-12-20 RX ORDER — IPRATROPIUM BROMIDE AND ALBUTEROL SULFATE 2.5; .5 MG/3ML; MG/3ML
1 SOLUTION RESPIRATORY (INHALATION)
Status: DISCONTINUED | OUTPATIENT
Start: 2017-12-21 | End: 2017-12-23

## 2017-12-20 RX ORDER — FENTANYL CITRATE 50 UG/ML
50 INJECTION, SOLUTION INTRAMUSCULAR; INTRAVENOUS ONCE
Status: COMPLETED | OUTPATIENT
Start: 2017-12-20 | End: 2017-12-20

## 2017-12-20 RX ORDER — 0.9 % SODIUM CHLORIDE 0.9 %
10 VIAL (ML) INJECTION ONCE
Status: COMPLETED | OUTPATIENT
Start: 2017-12-20 | End: 2017-12-20

## 2017-12-20 RX ORDER — NICOTINE POLACRILEX 4 MG
15 LOZENGE BUCCAL PRN
Status: DISCONTINUED | OUTPATIENT
Start: 2017-12-20 | End: 2017-12-24 | Stop reason: HOSPADM

## 2017-12-20 RX ORDER — SODIUM CHLORIDE 0.9 % (FLUSH) 0.9 %
10 SYRINGE (ML) INJECTION EVERY 12 HOURS SCHEDULED
Status: DISCONTINUED | OUTPATIENT
Start: 2017-12-21 | End: 2017-12-24 | Stop reason: HOSPADM

## 2017-12-20 RX ORDER — CLOPIDOGREL BISULFATE 75 MG/1
75 TABLET ORAL DAILY
Status: DISCONTINUED | OUTPATIENT
Start: 2017-12-21 | End: 2017-12-24 | Stop reason: HOSPADM

## 2017-12-20 RX ORDER — HEPARIN SODIUM 1000 [USP'U]/ML
80 INJECTION, SOLUTION INTRAVENOUS; SUBCUTANEOUS PRN
Status: DISCONTINUED | OUTPATIENT
Start: 2017-12-20 | End: 2017-12-21

## 2017-12-20 RX ORDER — HYDROCODONE BITARTRATE AND ACETAMINOPHEN 5; 325 MG/1; MG/1
1 TABLET ORAL EVERY 4 HOURS PRN
Status: DISCONTINUED | OUTPATIENT
Start: 2017-12-20 | End: 2017-12-24 | Stop reason: HOSPADM

## 2017-12-20 RX ORDER — ACETAMINOPHEN 325 MG/1
650 TABLET ORAL EVERY 4 HOURS PRN
Status: DISCONTINUED | OUTPATIENT
Start: 2017-12-20 | End: 2017-12-24 | Stop reason: HOSPADM

## 2017-12-20 RX ORDER — 0.9 % SODIUM CHLORIDE 0.9 %
1000 INTRAVENOUS SOLUTION INTRAVENOUS ONCE
Status: COMPLETED | OUTPATIENT
Start: 2017-12-20 | End: 2017-12-21

## 2017-12-20 RX ORDER — ALBUTEROL SULFATE 2.5 MG/3ML
2.5 SOLUTION RESPIRATORY (INHALATION)
Status: DISCONTINUED | OUTPATIENT
Start: 2017-12-20 | End: 2017-12-24 | Stop reason: HOSPADM

## 2017-12-20 RX ORDER — HEPARIN SODIUM 1000 [USP'U]/ML
80 INJECTION, SOLUTION INTRAVENOUS; SUBCUTANEOUS ONCE
Status: COMPLETED | OUTPATIENT
Start: 2017-12-20 | End: 2017-12-20

## 2017-12-20 RX ORDER — LEVOFLOXACIN 5 MG/ML
750 INJECTION, SOLUTION INTRAVENOUS EVERY 24 HOURS
Status: DISCONTINUED | OUTPATIENT
Start: 2017-12-21 | End: 2017-12-21

## 2017-12-20 RX ORDER — GABAPENTIN 100 MG/1
100 CAPSULE ORAL 3 TIMES DAILY
Status: DISCONTINUED | OUTPATIENT
Start: 2017-12-21 | End: 2017-12-24 | Stop reason: HOSPADM

## 2017-12-20 RX ORDER — ALBUTEROL SULFATE 2.5 MG/3ML
2.5 SOLUTION RESPIRATORY (INHALATION) ONCE
Status: COMPLETED | OUTPATIENT
Start: 2017-12-20 | End: 2017-12-20

## 2017-12-20 RX ORDER — TRAZODONE HYDROCHLORIDE 50 MG/1
50 TABLET ORAL NIGHTLY
Status: DISCONTINUED | OUTPATIENT
Start: 2017-12-21 | End: 2017-12-24 | Stop reason: HOSPADM

## 2017-12-20 RX ORDER — CLINDAMYCIN PHOSPHATE 600 MG/50ML
600 INJECTION INTRAVENOUS ONCE
Status: COMPLETED | OUTPATIENT
Start: 2017-12-20 | End: 2017-12-20

## 2017-12-20 RX ORDER — PANTOPRAZOLE SODIUM 40 MG/1
40 TABLET, DELAYED RELEASE ORAL
Status: DISCONTINUED | OUTPATIENT
Start: 2017-12-21 | End: 2017-12-24 | Stop reason: HOSPADM

## 2017-12-20 RX ORDER — SODIUM CHLORIDE 0.9 % (FLUSH) 0.9 %
10 SYRINGE (ML) INJECTION 2 TIMES DAILY
Status: DISCONTINUED | OUTPATIENT
Start: 2017-12-20 | End: 2017-12-21 | Stop reason: SDUPTHER

## 2017-12-20 RX ORDER — ONDANSETRON 2 MG/ML
4 INJECTION INTRAMUSCULAR; INTRAVENOUS ONCE
Status: COMPLETED | OUTPATIENT
Start: 2017-12-20 | End: 2017-12-20

## 2017-12-20 RX ORDER — BISACODYL 10 MG
10 SUPPOSITORY, RECTAL RECTAL DAILY PRN
Status: DISCONTINUED | OUTPATIENT
Start: 2017-12-20 | End: 2017-12-24 | Stop reason: HOSPADM

## 2017-12-20 RX ORDER — TAMSULOSIN HYDROCHLORIDE 0.4 MG/1
0.4 CAPSULE ORAL DAILY
Status: DISCONTINUED | OUTPATIENT
Start: 2017-12-21 | End: 2017-12-24 | Stop reason: HOSPADM

## 2017-12-20 RX ORDER — METOCLOPRAMIDE HYDROCHLORIDE 5 MG/ML
10 INJECTION INTRAMUSCULAR; INTRAVENOUS ONCE
Status: COMPLETED | OUTPATIENT
Start: 2017-12-20 | End: 2017-12-20

## 2017-12-20 RX ORDER — DEXTROSE MONOHYDRATE 50 MG/ML
100 INJECTION, SOLUTION INTRAVENOUS PRN
Status: DISCONTINUED | OUTPATIENT
Start: 2017-12-20 | End: 2017-12-24 | Stop reason: HOSPADM

## 2017-12-20 RX ORDER — SODIUM CHLORIDE 0.9 % (FLUSH) 0.9 %
10 SYRINGE (ML) INJECTION PRN
Status: DISCONTINUED | OUTPATIENT
Start: 2017-12-20 | End: 2017-12-24 | Stop reason: HOSPADM

## 2017-12-20 RX ORDER — ATORVASTATIN CALCIUM 10 MG/1
10 TABLET, FILM COATED ORAL DAILY
Status: DISCONTINUED | OUTPATIENT
Start: 2017-12-21 | End: 2017-12-24 | Stop reason: HOSPADM

## 2017-12-20 RX ORDER — HEPARIN SODIUM 10000 [USP'U]/100ML
18 INJECTION, SOLUTION INTRAVENOUS CONTINUOUS
Status: DISCONTINUED | OUTPATIENT
Start: 2017-12-20 | End: 2017-12-21

## 2017-12-20 RX ORDER — LANOLIN ALCOHOL/MO/W.PET/CERES
325 CREAM (GRAM) TOPICAL 2 TIMES DAILY WITH MEALS
Status: DISCONTINUED | OUTPATIENT
Start: 2017-12-21 | End: 2017-12-24 | Stop reason: HOSPADM

## 2017-12-20 RX ORDER — ONDANSETRON 2 MG/ML
4 INJECTION INTRAMUSCULAR; INTRAVENOUS EVERY 6 HOURS PRN
Status: DISCONTINUED | OUTPATIENT
Start: 2017-12-20 | End: 2017-12-24 | Stop reason: HOSPADM

## 2017-12-20 RX ORDER — ASPIRIN 81 MG/1
81 TABLET ORAL DAILY
Status: DISCONTINUED | OUTPATIENT
Start: 2017-12-21 | End: 2017-12-24 | Stop reason: HOSPADM

## 2017-12-20 RX ORDER — HEPARIN SODIUM 1000 [USP'U]/ML
40 INJECTION, SOLUTION INTRAVENOUS; SUBCUTANEOUS PRN
Status: DISCONTINUED | OUTPATIENT
Start: 2017-12-20 | End: 2017-12-21

## 2017-12-20 RX ORDER — 0.9 % SODIUM CHLORIDE 0.9 %
50 INTRAVENOUS SOLUTION INTRAVENOUS ONCE
Status: COMPLETED | OUTPATIENT
Start: 2017-12-20 | End: 2017-12-20

## 2017-12-20 RX ORDER — SODIUM CHLORIDE 9 MG/ML
INJECTION, SOLUTION INTRAVENOUS CONTINUOUS
Status: DISCONTINUED | OUTPATIENT
Start: 2017-12-21 | End: 2017-12-21

## 2017-12-20 RX ORDER — FLUTICASONE PROPIONATE 50 MCG
1 SPRAY, SUSPENSION (ML) NASAL DAILY PRN
Status: DISCONTINUED | OUTPATIENT
Start: 2017-12-20 | End: 2017-12-24 | Stop reason: HOSPADM

## 2017-12-20 RX ORDER — ALBUTEROL SULFATE 90 UG/1
1 AEROSOL, METERED RESPIRATORY (INHALATION) EVERY 6 HOURS PRN
Status: DISCONTINUED | OUTPATIENT
Start: 2017-12-20 | End: 2017-12-23

## 2017-12-20 RX ORDER — DOCUSATE SODIUM 100 MG/1
100 CAPSULE, LIQUID FILLED ORAL 2 TIMES DAILY
Status: DISCONTINUED | OUTPATIENT
Start: 2017-12-21 | End: 2017-12-24 | Stop reason: HOSPADM

## 2017-12-20 RX ORDER — AMLODIPINE BESYLATE 5 MG/1
5 TABLET ORAL DAILY
Status: DISCONTINUED | OUTPATIENT
Start: 2017-12-21 | End: 2017-12-24 | Stop reason: HOSPADM

## 2017-12-20 RX ORDER — LISINOPRIL 10 MG/1
10 TABLET ORAL DAILY
Status: DISCONTINUED | OUTPATIENT
Start: 2017-12-21 | End: 2017-12-24 | Stop reason: HOSPADM

## 2017-12-20 RX ORDER — ONDANSETRON 2 MG/ML
8 INJECTION INTRAMUSCULAR; INTRAVENOUS ONCE
Status: COMPLETED | OUTPATIENT
Start: 2017-12-20 | End: 2017-12-20

## 2017-12-20 RX ADMIN — PANTOPRAZOLE SODIUM 40 MG: 40 INJECTION, POWDER, FOR SOLUTION INTRAVENOUS at 20:21

## 2017-12-20 RX ADMIN — Medication 10 ML: at 20:21

## 2017-12-20 RX ADMIN — HEPARIN SODIUM 6200 UNITS: 1000 INJECTION, SOLUTION INTRAVENOUS; SUBCUTANEOUS at 22:30

## 2017-12-20 RX ADMIN — HEPARIN SODIUM AND DEXTROSE 18 UNITS/KG/HR: 10000; 5 INJECTION INTRAVENOUS at 22:30

## 2017-12-20 RX ADMIN — IOPAMIDOL 80 ML: 755 INJECTION, SOLUTION INTRAVENOUS at 21:23

## 2017-12-20 RX ADMIN — FENTANYL CITRATE 50 MCG: 50 INJECTION, SOLUTION INTRAMUSCULAR; INTRAVENOUS at 20:23

## 2017-12-20 RX ADMIN — Medication 1 MG: at 22:38

## 2017-12-20 RX ADMIN — METOCLOPRAMIDE 10 MG: 5 INJECTION, SOLUTION INTRAMUSCULAR; INTRAVENOUS at 20:20

## 2017-12-20 RX ADMIN — ONDANSETRON 4 MG: 2 INJECTION INTRAMUSCULAR; INTRAVENOUS at 22:37

## 2017-12-20 RX ADMIN — ALBUTEROL SULFATE 2.5 MG: 2.5 SOLUTION RESPIRATORY (INHALATION) at 20:55

## 2017-12-20 RX ADMIN — SODIUM CHLORIDE 1000 ML: 9 INJECTION, SOLUTION INTRAVENOUS at 20:18

## 2017-12-20 RX ADMIN — SODIUM CHLORIDE 50 ML: 9 INJECTION, SOLUTION INTRAVENOUS at 21:23

## 2017-12-20 RX ADMIN — CLINDAMYCIN PHOSPHATE 600 MG: 12 INJECTION, SOLUTION INTRAMUSCULAR; INTRAVENOUS at 21:46

## 2017-12-20 RX ADMIN — WATER 1 G: 1 INJECTION INTRAMUSCULAR; INTRAVENOUS; SUBCUTANEOUS at 21:41

## 2017-12-20 RX ADMIN — Medication 10 ML: at 21:23

## 2017-12-20 RX ADMIN — ONDANSETRON 8 MG: 2 INJECTION INTRAMUSCULAR; INTRAVENOUS at 20:19

## 2017-12-20 ASSESSMENT — PAIN SCALES - GENERAL
PAINLEVEL_OUTOF10: 4
PAINLEVEL_OUTOF10: 8
PAINLEVEL_OUTOF10: 7
PAINLEVEL_OUTOF10: 4
PAINLEVEL_OUTOF10: 7

## 2017-12-20 ASSESSMENT — PAIN DESCRIPTION - FREQUENCY: FREQUENCY: CONTINUOUS

## 2017-12-20 ASSESSMENT — PAIN DESCRIPTION - LOCATION: LOCATION: GENERALIZED

## 2017-12-20 ASSESSMENT — ENCOUNTER SYMPTOMS
WHEEZING: 1
NAUSEA: 1
ABDOMINAL PAIN: 1
SHORTNESS OF BREATH: 1
COUGH: 1

## 2017-12-20 NOTE — TELEPHONE ENCOUNTER
Pt wants Lyrica not the generic. Please prescribe Lyrica.   Patient would also like Nexium refill and not the generic of Nexium

## 2017-12-21 ENCOUNTER — APPOINTMENT (OUTPATIENT)
Dept: GENERAL RADIOLOGY | Age: 62
DRG: 175 | End: 2017-12-21
Payer: MEDICARE

## 2017-12-21 PROBLEM — J44.1 COPD EXACERBATION (HCC): Status: ACTIVE | Noted: 2017-12-21

## 2017-12-21 LAB
ALBUMIN SERPL-MCNC: 2.6 G/DL (ref 3.5–5.2)
ALBUMIN/GLOBULIN RATIO: ABNORMAL (ref 1–2.5)
ALP BLD-CCNC: 94 U/L (ref 40–129)
ALT SERPL-CCNC: 5 U/L (ref 5–41)
ANION GAP SERPL CALCULATED.3IONS-SCNC: 12 MMOL/L (ref 9–17)
AST SERPL-CCNC: <5 U/L
BILIRUB SERPL-MCNC: 0.61 MG/DL (ref 0.3–1.2)
BUN BLDV-MCNC: 20 MG/DL (ref 8–23)
BUN/CREAT BLD: 19 (ref 9–20)
CALCIUM SERPL-MCNC: 8.1 MG/DL (ref 8.6–10.4)
CHLORIDE BLD-SCNC: 103 MMOL/L (ref 98–107)
CO2: 25 MMOL/L (ref 20–31)
CREAT SERPL-MCNC: 1.08 MG/DL (ref 0.7–1.2)
GFR AFRICAN AMERICAN: >60 ML/MIN
GFR NON-AFRICAN AMERICAN: >60 ML/MIN
GFR SERPL CREATININE-BSD FRML MDRD: ABNORMAL ML/MIN/{1.73_M2}
GFR SERPL CREATININE-BSD FRML MDRD: ABNORMAL ML/MIN/{1.73_M2}
GLUCOSE BLD-MCNC: 110 MG/DL (ref 75–110)
GLUCOSE BLD-MCNC: 182 MG/DL (ref 75–110)
GLUCOSE BLD-MCNC: 194 MG/DL (ref 75–110)
GLUCOSE BLD-MCNC: 219 MG/DL (ref 70–99)
GLUCOSE BLD-MCNC: 426 MG/DL (ref 75–110)
GLUCOSE BLD-MCNC: 516 MG/DL (ref 75–110)
HCT VFR BLD CALC: 33.5 % (ref 41–53)
HEMOGLOBIN: 10.8 G/DL (ref 13.5–17.5)
MCH RBC QN AUTO: 28.7 PG (ref 26–34)
MCHC RBC AUTO-ENTMCNC: 32.3 G/DL (ref 31–37)
MCV RBC AUTO: 88.9 FL (ref 80–100)
PARTIAL THROMBOPLASTIN TIME: 30.6 SEC (ref 23–31)
PARTIAL THROMBOPLASTIN TIME: 34 SEC (ref 23–31)
PDW BLD-RTO: 13.8 % (ref 11.5–14.5)
PLATELET # BLD: 454 K/UL (ref 130–400)
PMV BLD AUTO: ABNORMAL FL (ref 6–12)
POTASSIUM SERPL-SCNC: 3.8 MMOL/L (ref 3.7–5.3)
RBC # BLD: 3.76 M/UL (ref 4.5–5.9)
SODIUM BLD-SCNC: 140 MMOL/L (ref 135–144)
TOTAL PROTEIN: 6.5 G/DL (ref 6.4–8.3)
WBC # BLD: 10.8 K/UL (ref 3.5–11)

## 2017-12-21 PROCEDURE — 74230 X-RAY XM SWLNG FUNCJ C+: CPT

## 2017-12-21 PROCEDURE — 2500000003 HC RX 250 WO HCPCS: Performed by: INTERNAL MEDICINE

## 2017-12-21 PROCEDURE — 6370000000 HC RX 637 (ALT 250 FOR IP): Performed by: INTERNAL MEDICINE

## 2017-12-21 PROCEDURE — 82947 ASSAY GLUCOSE BLOOD QUANT: CPT

## 2017-12-21 PROCEDURE — 94640 AIRWAY INHALATION TREATMENT: CPT

## 2017-12-21 PROCEDURE — G8996 SWALLOW CURRENT STATUS: HCPCS

## 2017-12-21 PROCEDURE — 85730 THROMBOPLASTIN TIME PARTIAL: CPT

## 2017-12-21 PROCEDURE — 36415 COLL VENOUS BLD VENIPUNCTURE: CPT

## 2017-12-21 PROCEDURE — 6370000000 HC RX 637 (ALT 250 FOR IP): Performed by: NURSE PRACTITIONER

## 2017-12-21 PROCEDURE — 6360000002 HC RX W HCPCS: Performed by: INTERNAL MEDICINE

## 2017-12-21 PROCEDURE — 80053 COMPREHEN METABOLIC PANEL: CPT

## 2017-12-21 PROCEDURE — 2060000000 HC ICU INTERMEDIATE R&B

## 2017-12-21 PROCEDURE — 93970 EXTREMITY STUDY: CPT

## 2017-12-21 PROCEDURE — 2580000003 HC RX 258: Performed by: INTERNAL MEDICINE

## 2017-12-21 PROCEDURE — G8997 SWALLOW GOAL STATUS: HCPCS

## 2017-12-21 PROCEDURE — 99223 1ST HOSP IP/OBS HIGH 75: CPT | Performed by: INTERNAL MEDICINE

## 2017-12-21 PROCEDURE — 85027 COMPLETE CBC AUTOMATED: CPT

## 2017-12-21 PROCEDURE — 92611 MOTION FLUOROSCOPY/SWALLOW: CPT

## 2017-12-21 PROCEDURE — 6360000002 HC RX W HCPCS: Performed by: EMERGENCY MEDICINE

## 2017-12-21 RX ORDER — METHYLPREDNISOLONE SODIUM SUCCINATE 40 MG/ML
40 INJECTION, POWDER, LYOPHILIZED, FOR SOLUTION INTRAMUSCULAR; INTRAVENOUS EVERY 6 HOURS
Status: DISCONTINUED | OUTPATIENT
Start: 2017-12-21 | End: 2017-12-22

## 2017-12-21 RX ORDER — ACETYLCYSTEINE 200 MG/ML
200 SOLUTION ORAL; RESPIRATORY (INHALATION) 2 TIMES DAILY
Status: DISCONTINUED | OUTPATIENT
Start: 2017-12-21 | End: 2017-12-23

## 2017-12-21 RX ADMIN — LEVOFLOXACIN 750 MG: 5 INJECTION, SOLUTION INTRAVENOUS at 04:02

## 2017-12-21 RX ADMIN — IPRATROPIUM BROMIDE AND ALBUTEROL SULFATE 1 AMPULE: .5; 3 SOLUTION RESPIRATORY (INHALATION) at 20:18

## 2017-12-21 RX ADMIN — LISINOPRIL 10 MG: 10 TABLET ORAL at 09:05

## 2017-12-21 RX ADMIN — IPRATROPIUM BROMIDE AND ALBUTEROL SULFATE 1 AMPULE: .5; 3 SOLUTION RESPIRATORY (INHALATION) at 11:15

## 2017-12-21 RX ADMIN — AZITHROMYCIN MONOHYDRATE 500 MG: 500 INJECTION, POWDER, LYOPHILIZED, FOR SOLUTION INTRAVENOUS at 01:56

## 2017-12-21 RX ADMIN — METHYLPREDNISOLONE SODIUM SUCCINATE 40 MG: 40 INJECTION, POWDER, FOR SOLUTION INTRAMUSCULAR; INTRAVENOUS at 15:08

## 2017-12-21 RX ADMIN — INSULIN LISPRO 6 UNITS: 100 INJECTION, SOLUTION INTRAVENOUS; SUBCUTANEOUS at 17:43

## 2017-12-21 RX ADMIN — INSULIN LISPRO 1 UNITS: 100 INJECTION, SOLUTION INTRAVENOUS; SUBCUTANEOUS at 12:36

## 2017-12-21 RX ADMIN — FERROUS SULFATE TAB EC 325 MG (65 MG FE EQUIVALENT) 325 MG: 325 (65 FE) TABLET DELAYED RESPONSE at 09:04

## 2017-12-21 RX ADMIN — ACETYLCYSTEINE 200 MG: 200 SOLUTION ORAL; RESPIRATORY (INHALATION) at 20:18

## 2017-12-21 RX ADMIN — METHYLPREDNISOLONE SODIUM SUCCINATE 40 MG: 40 INJECTION, POWDER, FOR SOLUTION INTRAMUSCULAR; INTRAVENOUS at 09:58

## 2017-12-21 RX ADMIN — ATORVASTATIN CALCIUM 10 MG: 10 TABLET, FILM COATED ORAL at 09:03

## 2017-12-21 RX ADMIN — INSULIN LISPRO 3 UNITS: 100 INJECTION, SOLUTION INTRAVENOUS; SUBCUTANEOUS at 21:55

## 2017-12-21 RX ADMIN — IPRATROPIUM BROMIDE AND ALBUTEROL SULFATE 1 AMPULE: .5; 3 SOLUTION RESPIRATORY (INHALATION) at 14:56

## 2017-12-21 RX ADMIN — HEPARIN SODIUM AND DEXTROSE 22 UNITS/KG/HR: 10000; 5 INJECTION INTRAVENOUS at 12:35

## 2017-12-21 RX ADMIN — TRAZODONE HYDROCHLORIDE 50 MG: 50 TABLET ORAL at 21:41

## 2017-12-21 RX ADMIN — METHYLPREDNISOLONE SODIUM SUCCINATE 40 MG: 40 INJECTION, POWDER, FOR SOLUTION INTRAMUSCULAR; INTRAVENOUS at 21:52

## 2017-12-21 RX ADMIN — TAZOBACTAM SODIUM AND PIPERACILLIN SODIUM 3.38 G: 375; 3 INJECTION, SOLUTION INTRAVENOUS at 04:29

## 2017-12-21 RX ADMIN — HYDROCODONE BITARTRATE AND ACETAMINOPHEN 2 TABLET: 5; 325 TABLET ORAL at 05:40

## 2017-12-21 RX ADMIN — IPRATROPIUM BROMIDE AND ALBUTEROL SULFATE 1 AMPULE: .5; 3 SOLUTION RESPIRATORY (INHALATION) at 05:47

## 2017-12-21 RX ADMIN — AMLODIPINE BESYLATE 5 MG: 5 TABLET ORAL at 09:05

## 2017-12-21 RX ADMIN — SODIUM CHLORIDE: 9 INJECTION, SOLUTION INTRAVENOUS at 01:02

## 2017-12-21 RX ADMIN — METOPROLOL TARTRATE 25 MG: 25 TABLET ORAL at 21:42

## 2017-12-21 RX ADMIN — TAZOBACTAM SODIUM AND PIPERACILLIN SODIUM 3.38 G: 375; 3 INJECTION, SOLUTION INTRAVENOUS at 10:51

## 2017-12-21 RX ADMIN — CLOPIDOGREL BISULFATE 75 MG: 75 TABLET ORAL at 09:05

## 2017-12-21 RX ADMIN — METOPROLOL TARTRATE 25 MG: 25 TABLET ORAL at 01:55

## 2017-12-21 RX ADMIN — TRAZODONE HYDROCHLORIDE 50 MG: 50 TABLET ORAL at 01:56

## 2017-12-21 RX ADMIN — HEPARIN SODIUM 3100 UNITS: 1000 INJECTION, SOLUTION INTRAVENOUS; SUBCUTANEOUS at 05:38

## 2017-12-21 RX ADMIN — TAZOBACTAM SODIUM AND PIPERACILLIN SODIUM 3.38 G: 375; 3 INJECTION, SOLUTION INTRAVENOUS at 19:49

## 2017-12-21 RX ADMIN — ENOXAPARIN SODIUM 70 MG: 80 INJECTION SUBCUTANEOUS at 18:48

## 2017-12-21 RX ADMIN — PANTOPRAZOLE SODIUM 40 MG: 40 TABLET, DELAYED RELEASE ORAL at 06:50

## 2017-12-21 RX ADMIN — INSULIN LISPRO 1 UNITS: 100 INJECTION, SOLUTION INTRAVENOUS; SUBCUTANEOUS at 02:05

## 2017-12-21 RX ADMIN — INSULIN LISPRO 9 UNITS: 100 INJECTION, SOLUTION INTRAVENOUS; SUBCUTANEOUS at 21:52

## 2017-12-21 RX ADMIN — HYDROCODONE BITARTRATE AND ACETAMINOPHEN 2 TABLET: 5; 325 TABLET ORAL at 15:08

## 2017-12-21 RX ADMIN — TAMSULOSIN HYDROCHLORIDE 0.4 MG: 0.4 CAPSULE ORAL at 09:03

## 2017-12-21 RX ADMIN — DOCUSATE SODIUM 100 MG: 100 CAPSULE, LIQUID FILLED ORAL at 01:55

## 2017-12-21 RX ADMIN — HYDROCODONE BITARTRATE AND ACETAMINOPHEN 2 TABLET: 5; 325 TABLET ORAL at 19:49

## 2017-12-21 RX ADMIN — HEPARIN SODIUM 3100 UNITS: 1000 INJECTION, SOLUTION INTRAVENOUS; SUBCUTANEOUS at 11:59

## 2017-12-21 RX ADMIN — HYDROCODONE BITARTRATE AND ACETAMINOPHEN 2 TABLET: 5; 325 TABLET ORAL at 01:20

## 2017-12-21 RX ADMIN — ASPIRIN 81 MG: 81 TABLET, COATED ORAL at 09:05

## 2017-12-21 RX ADMIN — METOPROLOL TARTRATE 25 MG: 25 TABLET ORAL at 09:04

## 2017-12-21 RX ADMIN — HYDROCODONE BITARTRATE AND ACETAMINOPHEN 2 TABLET: 5; 325 TABLET ORAL at 09:58

## 2017-12-21 ASSESSMENT — PAIN DESCRIPTION - PAIN TYPE
TYPE: CHRONIC PAIN

## 2017-12-21 ASSESSMENT — PAIN SCALES - GENERAL
PAINLEVEL_OUTOF10: 7
PAINLEVEL_OUTOF10: 2
PAINLEVEL_OUTOF10: 3
PAINLEVEL_OUTOF10: 0
PAINLEVEL_OUTOF10: 7
PAINLEVEL_OUTOF10: 0
PAINLEVEL_OUTOF10: 0
PAINLEVEL_OUTOF10: 4
PAINLEVEL_OUTOF10: 8
PAINLEVEL_OUTOF10: 7
PAINLEVEL_OUTOF10: 7

## 2017-12-21 ASSESSMENT — PAIN DESCRIPTION - LOCATION
LOCATION: GENERALIZED

## 2017-12-21 ASSESSMENT — PAIN DESCRIPTION - FREQUENCY
FREQUENCY: CONTINUOUS
FREQUENCY: CONTINUOUS

## 2017-12-21 NOTE — PLAN OF CARE
Problem: Risk for Impaired Skin Integrity  Goal: Tissue integrity - skin and mucous membranes  Structural intactness and normal physiological function of skin and  mucous membranes. Outcome: Ongoing  Patient is independent and turns self. Encouraged patient to reposition self. Did skin care education with patient. Will continue to monitor.

## 2017-12-21 NOTE — PROGRESS NOTES
Patient states he is taking Lyrica and not Gabapentin. Called [facility] (patients pharmacy). Patient had prescription for:    Lyrica filled on April 24, 2017 dosing 100mg tablet three times a day. Gabapentin filled on September 17, 2017 dosing 100mg tablet three times daily. Patient states \"he takes lyrica and not gabapentin. When writer questioned patient regarding prescriptions he states Guillaume Wong has not been taking either medications since September. \"

## 2017-12-21 NOTE — PROGRESS NOTES
assess  · Anthropometric Measures:  · Ht: 6' 1\" (185.4 cm)   · Admission Body Wt: 170 lb (77.1 kg)  · % Weight Change: 9.6%,  2 months  · Ideal Body Wt: 184 lb (83.5 kg), % Ideal Body 87%  · BMI Classification: BMI 18.5 - 24.9 Normal Weight  · Comparative Standards (Estimated Nutrition Needs):  · Estimated Daily Total Kcal: 5460-3143  · Estimated Daily Protein (g): 77-92  · Estimated Daily Fluids (mL): 4786-1384    Estimated Intake vs Estimated Needs: Intake Less Than Needs    Nutrition Risk Level: High    Nutrition Interventions:   Continue current diet, Start ONS  Education Needed    Nutrition Evaluation:   · Evaluation: Goals set   · Goals: Adherence to CHO controlled diet with weight maitenance or intentional wt loss    · Monitoring: Meal Intake, Supplement Intake, Diet Tolerance, Skin Integrity, Wound Healing, Ascites/Edema, Weight, Pertinent Labs, Nausea or Vomiting, Constipation, Patient/Family Education    See Adult Nutrition Doc Flowsheet for more detail.      Electronically signed by Bianca GONZALEZ, RDN, LD on 12/21/2017 at 4:07 PM    Contact Number:  [phone]

## 2017-12-21 NOTE — CARE COORDINATION
Attempt to meet with patient for discharge planning and care transitions. Patient not available at time of attempt. Will try again at later time/date. Patient is known to writer from previous admissions/care transitions. Hx of above the knee amputation, now with prosthesis. Review of MR indicates now active with pain management. Care Transitions will continue to follow with patient.
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
## 2017-12-21 NOTE — ED NOTES
Pt presents to ED via private auto with c/o SOB and dizziness. Pt states that he started having SOB x3 days ago accompanied by dizziness. Pt denies chest pain, nausea, and emesis at this time. Pt's RUL, RML, and ANTONIA are diminished, and RLL and LLL have crackles. Pt also states having a random cough with bloody sputum. Pt's neuro assessment is WNL. Pt's mucous membranes are pink and moist, pt's skin is warm and dry. Pt's respirations are even and non-labored, no distress noted at this time, will continue to monitor pt.       Miguel Hoffmann RN  12/20/17 4438

## 2017-12-21 NOTE — ED PROVIDER NOTES
1 tablet by mouth daily    ESOMEPRAZOLE (NEXIUM) 40 MG DELAYED RELEASE CAPSULE    TAKE ONE CAPSULE BY MOUTH TWICE DAILY    FERROUS SULFATE 325 (65 FE) MG EC TABLET    Take 1 tablet by mouth 2 times daily (with meals)    FLUTICASONE (FLONASE) 50 MCG/ACT NASAL SPRAY    1 spray by Nasal route daily as needed for Allergies (during allergy season)    GABAPENTIN (NEURONTIN) 100 MG CAPSULE    Take 1 capsule by mouth 3 times daily    INSULIN ASPART (NOVOLOG FLEXPEN) 100 UNIT/ML INJECTION PEN    Inject 5 Units into the skin 3 times daily (before meals)    INSULIN GLARGINE (TOUJEO SOLOSTAR) 300 UNIT/ML INJECTION PEN    Inject 75 Units into the skin nightly    INSULIN PEN NEEDLE 32G X 4 MM MISC    1 each by Does not apply route daily    LISINOPRIL (PRINIVIL;ZESTRIL) 10 MG TABLET    Take 1 tablet by mouth daily    METOPROLOL TARTRATE (LOPRESSOR) 25 MG TABLET    TAKE 1 TABLET BY MOUTH EVERY 12 HOURS    SENNA (SENOKOT) 8.6 MG TABLET    Take 2 tablets by mouth nightly as needed for Constipation    TAMSULOSIN (FLOMAX) 0.4 MG CAPSULE    Take 1 capsule by mouth daily    TRAZODONE (DESYREL) 50 MG TABLET    Take 1 tablet by mouth nightly    TRUEPLUS LANCETS 30G MISC    USE AS DIRECTED   Medication Dispense Information (as of 12/20/2017)      Dispensed Written Strength Form Quantity Refills Days Supply Provider Pharmacy   GABAPENTIN 100 MG CAPSULE 09/17/2017 07/24/2017   90 2 30 [facility]   OXYCODONE HCL 5 MG TABLET 08/15/2017 08/15/2017   21 0 7 CRISSY WHITFIELD [facility]   OXYCODONE HCL 5 MG TABLET 08/07/2017 08/04/2017   21 0 7 [facility]   OXYCODONE HCL 5 MG TABLET 07/31/2017 07/26/2017   21 0 7 MICHAEL BROWNBERLY [facility]    GABAPENTIN 100 MG CAPSULE 07/25/2017 07/24/2017   90 2 30 DERIC [facility]   OXYCODONE HCL 5 MG TABLET 07/01/2017 06/30/2017   90 0 30 MD DANIEL, Amarilis [facility]   OXYCODONE-ACETAMINOPHEN 5-325 06/10/2017 06/10/2017   5 0 2 MD GAUDENCIO, Irina [facility]. (*)     All other components within normal limits   CBC WITH AUTO DIFFERENTIAL - Abnormal; Notable for the following:     WBC 11.6 (*)     RBC 4.47 (*)     Hemoglobin 12.7 (*)     Hematocrit 39.6 (*)     Platelets 251 (*)     Seg Neutrophils 72 (*)     Lymphocytes 14 (*)     Monocytes 10 (*)     Segs Absolute 8.20 (*)     Absolute Mono # 1.20 (*)     All other components within normal limits   TOX SCR, BLD, ED - Abnormal; Notable for the following:     Salicylate Lvl <1 (*)     Acetaminophen Level <10 (*)     All other components within normal limits   CULTURE BLOOD #1   CULTURE BLOOD #1   BRAIN NATRIURETIC PEPTIDE   LACTIC ACID   TROP/MYOGLOBIN   CK ISOENZYMES   MAGNESIUM   D-DIMER, QUANTITATIVE   POCT GLUCOSE   Results for Zaira Benitez (MRN [id number]) as of 12/20/2017 21:06   Ref. Range 12/20/2017 20:15 12/20/2017 20:54   Sodium Latest Ref Range: 135 - 144 mmol/L 133 (L)    Potassium Latest Ref Range: 3.7 - 5.3 mmol/L 3.7    Chloride Latest Ref Range: 98 - 107 mmol/L 94 (L)    CO2 Latest Ref Range: 20 - 31 mmol/L 24    BUN Latest Ref Range: 8 - 23 mg/dL 22    Creatinine Latest Ref Range: 0.70 - 1.20 mg/dL 1.11    Bun/Cre Ratio Latest Ref Range: 9 - 20  20    Anion Gap Latest Ref Range: 9 - 17 mmol/L 15    GFR Non- Latest Ref Range: >60 mL/min >60    GFR African American Latest Ref Range: >60 mL/min >60    Magnesium Latest Ref Range: 1.6 - 2.6 mg/dL 2.0    Lactic Acid Latest Ref Range: 0.5 - 2.2 mmol/L 0.9    Glucose Latest Ref Range: 70 - 99 mg/dL 149 (H)    Calcium Latest Ref Range: 8.6 - 10.4 mg/dL 8.8    GFR Comment Unknown Pend    GFR Staging Unknown NOT REPORTED    Total CK Latest Ref Range: 39 - 308 U/L 45    % CKMB Latest Ref Range: 0.0 - 3.5 % 2.9    CK-MB Latest Ref Range: <10.5 ng/mL 1.3    CKMB Interpretation Unknown NORMAL ISOENZYME . Meron Snooks Meron Snooks     Myoglobin Latest Ref Range: 28 - 72 ng/mL 62    Pro-BNP Latest Ref Range: <300 pg/mL 177    Troponin T Latest Ref Range: <0.03 ng/mL <0.03 TempSrc: Oral      SpO2: 97% 96% 100% 95%   Weight: 170 lb (77.1 kg)      Height: 6' 1\" (1.854 m)        Patient is evaluated. He is placed on telemetry and oxygen. IV access established. Laboratory and imaging investigations reviewed. Patient is high aspiration risk. Chest x-ray does demonstrate pneumonia likely related to aspiration. Blood cultures were obtained. He is covered with clindamycin and Rocephin initially with additional antibiotics to be obtaining the floor per admitting physician team.  There is also question of recurrence of neoplasm that will require evaluation. Patient also has a filling defect consistent with pulmonary embolus although this finding is age indeterminate. Patient does not recall ever having been diagnosed with pulmonary embolus. He does have risk factors. He is covered with heparin pending reevaluation. Cardiac evaluation is benign. Remainder of investigations without significant abnormality. Care is discussed with internal medicine to facilitate admission and additional care    CONSULTS:  IP CONSULT TO INTERNAL MEDICINE    PROCEDURES:  None    FINAL IMPRESSION      1. Pneumonia due to organism    2. Aspiration into lower respiratory tract, initial encounter    3. Pleural effusion    4. History of esophageal cancer    5. Positive D dimer    6. Other pulmonary embolism without acute cor pulmonale, unspecified chronicity (HCC)          DISPOSITION/PLAN   DISPOSITION      Decision to Admit    PATIENT REFERRED TO:   No follow-up provider specified. DISCHARGE MEDICATIONS:     New Prescriptions    No medications on file     Controlled Substances Monitoring:     Attestation: The Prescription Monitoring Report for this patient was reviewed today.  Morena Vazquez MD)  Documentation: No signs of potential drug abuse or diversion identified. Morena Vazquez MD)      (Please note that portions of this note were completed with a voice recognition program. Efforts were made to edit the dictations but occasionally words are mis-transcribed.)    Ann Montemayor MD  Attending Emergency Physician         Ann Montemayor MD  12/20/17 9855

## 2017-12-21 NOTE — CARE COORDINATION
Case Management Initial Discharge Plan  Mikhail Francisco,         Readmission Risk              Readmission Risk:        29       Age 74 or Greater:  0    Admitted from SNF or Requires Paid or Family Care:  0    Currently has CHF,COPD,ARF,CRI,or is on dialysis:  4    Takes more than 5 Prescription Medications:  4    Takes Digoxin,Insulin,Anticoagulants,Narcotics or ASA/Plavix:  [address] in Past 12 Months:  10    On Disability:  3    Patient Considers own Health:  5            Met with:patient to discuss discharge plans. Information verified: address, contacts, phone number, , insurance Yes  PCP: Josue Camarena MD  Date of last visit: 10/27    Insurance Provider: Medicare    Discharge Planning  Current Residence:  house  Living Arrangements:  VIGNESH Kunz has 1 stories/2 stairs to climb  Support Systems:  None  Current Services PTA:   none Agency:  none  Patient able to perform ADL's:Independent  DME in home:  jeannie Cunningham chair  DME used to aid ambulation prior to admission:   walker  DME used during admission:  walker    Potential Assistance Needed:  [address]: [place] [address] [place] and [employer] Medications:  No  Does patient want to participate in local refill/ meds to beds program?  No    Patient agreeable to home care: No  [place] of choice provided:  n/a      Type of Home Care Services:  None  Patient expects to be discharged to:  Home    Prior SNF/Rehab Placement and Facility:    Agreeable to SNF/Rehab: No  [place] of choice provided: n/a   Evaluation: no    Expected Discharge date:  17  Follow Up Appointment: Best Day/ Time: Tuesday PM    Transportation provider: self  Transportation arrangements needed for discharge: No    Discharge Plan: Met with pt at bedside. Pt lives alone and has been independent at home with use of his prosthesis. Pt states he is active . Discussed need for blood thinner at discharge.       Script
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
## 2017-12-21 NOTE — H&P
5/26/2016    left foot 5th toe    UPPER GASTROINTESTINAL ENDOSCOPY      5/14/13- with dilation    VASCULAR SURGERY Right 01/16/2017    foot guillotine amputation        Medications Prior to Admission:     Prior to Admission medications    Medication Sig Start Date End Date Taking?  Authorizing Provider   aspirin EC 81 MG EC tablet Take 1 tablet by mouth daily 10/27/17  Yes Aleena France MD   traZODone (DESYREL) 50 MG tablet Take 1 tablet by mouth nightly 7/17/17  Yes Aleena France MD   lisinopril (PRINIVIL;ZESTRIL) 10 MG tablet Take 1 tablet by mouth daily 4/11/17  Yes Kwan Blevins MD   amLODIPine (NORVASC) 5 MG tablet Take 1 tablet by mouth daily Hold if SBP <100 5/25/17  Yes Patti Yeboah MD   ferrous sulfate 325 (65 FE) MG EC tablet Take 1 tablet by mouth 2 times daily (with meals) 5/25/17  Yes Patti Yeboah MD   metoprolol tartrate (LOPRESSOR) 25 MG tablet TAKE 1 TABLET BY MOUTH EVERY 12 HOURS 5/25/17  Yes Patti Yeboah MD   atorvastatin (LIPITOR) 10 MG tablet TAKE 1 TABLET BY MOUTH DAILY 5/25/17  Yes Patti Yeboah MD   insulin glargine (TOUJEO SOLOSTAR) 300 UNIT/ML injection pen Inject 75 Units into the skin nightly 5/25/17  Yes Patti Yeboah MD   esomeprazole ([address]) 40 MG delayed release capsule TAKE ONE CAPSULE BY MOUTH TWICE DAILY 11/8/17   Aleena France MD   gabapentin (NEURONTIN) 100 MG capsule Take 1 capsule by mouth 3 times daily 10/27/17   Aleena France MD   clopidogrel (PLAVIX) 75 MG tablet Take 1 tablet by mouth daily 10/27/17   Aleena France MD   fluticasone (FLONASE) 50 MCG/ACT nasal spray 1 spray by Nasal route daily as needed for Allergies (during allergy season) 6/22/17   Aleena France MD   tamsulosin (FLOMAX) 0.4 MG capsule Take 1 capsule by mouth daily 5/25/17   Patti Yeboah MD   insulin aspart (NOVOLOG FLEXPEN) 100 UNIT/ML injection pen Inject 5 Units into the skin 3 times daily (before meals) 5/25/17   Patti Yeboah MD   albuterol sulfate HFA (VENTOLIN HFA) 108 (90 BASE) MCG/ACT inhaler INHALE 2 PUFFS INTO THE LUNGS EVERY 6 HOURS AS NEEDED FOR WHEEZING 5/25/17   Patti Licona MD   Insulin Pen Needle 32G X 4 MM MISC 1 each by Does not apply route daily 3/17/17   Melecio Bond MD   senna (SENOKOT) 8.6 MG tablet Take 2 tablets by mouth nightly as needed for Constipation 11/7/16   Melecio Bond MD   TRUEPLUS LANCETS 30G MISC USE AS DIRECTED 7/21/16   Melecio Bond MD        Allergies:     Review of patient's allergies indicates no known allergies. Social History:     Tobacco:    reports that he has been smoking Cigarettes. He has a 30.00 pack-year smoking history. He has never used smokeless tobacco.  Alcohol:      reports that he does not drink alcohol. Drug Use:  reports that he does not use drugs. Family History:     Family History   Problem Relation Age of Onset    Diabetes Mother     Cancer Mother     Alcohol Abuse Father     Cancer Sister     Alcohol Abuse Maternal Aunt     Alcohol Abuse Maternal Uncle     Alcohol Abuse Paternal Aunt        Review of Systems:     Positive and Negative as described in HPI. CONSTITUTIONAL:  negative for fatigue, weight loss  HEENT:  negative for vision, hearing changes, runny nose, throat pain  RESPIRATORY:  positive for shortness of breath, cough, congestion, wheezing. CARDIOVASCULAR:  negative for chest pain, palpitations.   GASTROINTESTINAL:  negative for diarrhea, constipation, change in bowel habits, abdominal pain   GENITOURINARY:  negative for difficulty of urination, burning with urination, frequency   INTEGUMENT:  negative for rash, skin lesions, easy bruising   HEMATOLOGIC/LYMPHATIC:  negative for swelling/edema   ALLERGIC/IMMUNOLOGIC:  negative for urticaria , itching  ENDOCRINE:  negative increase in drinking, increase in urination, hot or cold intolerance  MUSCULOSKELETAL:  negative joint pains, muscle aches, swelling of joints  NEUROLOGICAL:  negative for Value Ref Range    Ethanol <10 <10 mg/dL    Ethanol percent <0.329 %    Salicylate Lvl <1 (L) 3 - 10 mg/dL    Acetaminophen Level <10 (L) 10 - 30 ug/mL    Toxic Tricyclic Sc,Blood NEGATIVE NEG   Trop/Myoglobin    Collection Time: 12/20/17  8:15 PM   Result Value Ref Range    Troponin T <0.03 <0.03 ng/mL    Troponin Interp          Myoglobin 62 28 - 72 ng/mL   Creat Kinase-MB Iso    Collection Time: 12/20/17  8:15 PM   Result Value Ref Range    Total CK 45 39 - 308 U/L    CK-MB 1.3 <10.5 ng/mL    % CKMB 2.9 0.0 - 3.5 %    CKMB Interpretation NORMAL ISOENZYME PATTERN    Magnesium    Collection Time: 12/20/17  8:15 PM   Result Value Ref Range    Magnesium 2.0 1.6 - 2.6 mg/dL   D-Dimer, Quantitative    Collection Time: 12/20/17  8:15 PM   Result Value Ref Range    D-Dimer, Quant 1.63 mg/L FEU   Protime-INR    Collection Time: 12/20/17  8:15 PM   Result Value Ref Range    Protime 9.9 9.7 - 11.6 sec    INR 1.0    APTT    Collection Time: 12/20/17  8:15 PM   Result Value Ref Range    PTT 29.6 23 - 31 sec   Culture Blood #1    Collection Time: 12/20/17  9:00 PM   Result Value Ref Range    Specimen Description       . BLOOD 10ML RTA Performed at [address]    Specimen Description  [address] [phone]     Special Requests NOT REPORTED     Culture NO GROWTH 1 HOUR     Culture       Performed at [address] [phone]    Status Pending    Culture Blood #1    Collection Time: 12/20/17  9:15 PM   Result Value Ref Range    Specimen Description       . BLOOD 10ML LTA Performed at [address]    Specimen Description  [address] [phone]     Special Requests NOT REPORTED     Culture NO GROWTH 1 HOUR     Culture       Performed at [address] [phone]    Status Pending    POC Glucose Fingerstick    Collection Time: 12/21/17  1:52 AM   Result Value Ref Range    POC Glucose 194 (H) 75 - 110 mg/dL   APTT    Collection Time: 12/21/17  4:06 AM   Result Value Ref Range    PTT 34.0 (H) 23 - 31 sec   CBC    Collection Time: 12/21/17  4:06 AM   Result Value Ref Range    WBC 10.8 3.5 - 11.0 k/uL    RBC 3.76 (L) 4.5 - 5.9 m/uL    Hemoglobin 10.8 (L) 13.5 - 17.5 g/dL    Hematocrit 33.5 (L) 41 - 53 %    MCV 88.9 80 - 100 fL    MCH 28.7 26 - 34 pg    MCHC 32.3 31 - 37 g/dL    RDW 13.8 11.5 - 14.5 %    Platelets 434 (H) 617 - 400 k/uL    MPV NOT REPORTED 6.0 - 12.0 fL   Comprehensive Metabolic Panel    Collection Time: 12/21/17  4:06 AM   Result Value Ref Range    Glucose 219 (H) 70 - 99 mg/dL    BUN 20 8 - 23 mg/dL    CREATININE 1.08 0.70 - 1.20 mg/dL    Bun/Cre Ratio 19 9 - 20    Calcium 8.1 (L) 8.6 - 10.4 mg/dL    Sodium 140 135 - 144 mmol/L    Potassium 3.8 3.7 - 5.3 mmol/L    Chloride 103 98 - 107 mmol/L    CO2 25 20 - 31 mmol/L    Anion Gap 12 9 - 17 mmol/L    Alkaline Phosphatase 94 40 - 129 U/L    ALT 5 5 - 41 U/L    AST <5 <40 U/L    Total Bilirubin 0.61 0.3 - 1.2 mg/dL    Total Protein 6.5 6.4 - 8.3 g/dL    Alb 2.6 (L) 3.5 - 5.2 g/dL    Albumin/Globulin Ratio NOT REPORTED 1.0 - 2.5    GFR Non-African American >60 >60 mL/min    GFR African American >60 >60 mL/min    GFR Comment          GFR Staging NOT REPORTED        Imaging/Diagnostics:    Ct chest:     Impression   Partial filling defect segmental branch left upper lobe pulmonary artery. Consider chronic thromboembolic disease.       New left upper lobe airspace disease.  Differential considerations include   chronic veno-occlusive disease, pneumonia, neoplasm, or inflammatory disease. Recommend follow-up to resolution.       Stable pleuroparenchymal airspace disease left lower lobe.       Other incidental findings as noted above including gastric pull-through.          Assessment :      Primary Problem  Other pulmonary embolism without acute cor pulmonale [facility])    Active Hospital Problems    Diagnosis Date Noted  COPD exacerbation ([facility] [address] [J44.1] 12/21/2017    Other pulmonary embolism without acute cor pulmonale (HCC) [I26.99] 12/20/2017    Benign essential HTN [I10]     PAD (peripheral artery disease) (HCC) [I73.9]     Chronic obstructive pulmonary disease (HCC) [J44.9]     DM type 2 with diabetic peripheral neuropathy (HCC) [E11.42]     Esophageal cancer (HCC) [C15.9] 10/23/2014    GERD (gastroesophageal reflux disease) [K21.9]     Tobacco abuse [Z72.0]        Plan:     Patient status Admit as inpatient in the  Progressive Unit/Step down    1. Iv abx-streamline them  2. Heparin drip today  3. Will need to check on oral anticoagulation with insurance  4. Check video swallow study  5. pulm eval  6. Move out of icu, no icu needs at this time  7. Check ble venous dopplers  8. Iv steroids    Consultations:   IP CONSULT TO INTERNAL MEDICINE  IP CONSULT TO DIETITIAN  IP CONSULT TO PULMONOLOGY     Patient is admitted as inpatient status because of co-morbidities listed above, severity of signs and symptoms as outlined, requirement for current medical therapies and most importantly because of direct risk to patient if care not provided in a hospital setting.     Alix Chiu DO  12/21/2017  8:26 AM    Copy sent to Dr. Candida Hall MD

## 2017-12-22 ENCOUNTER — APPOINTMENT (OUTPATIENT)
Dept: GENERAL RADIOLOGY | Age: 62
DRG: 175 | End: 2017-12-22
Payer: MEDICARE

## 2017-12-22 LAB
ABSOLUTE EOS #: 0 K/UL (ref 0–0.4)
ABSOLUTE IMMATURE GRANULOCYTE: ABNORMAL K/UL (ref 0–0.3)
ABSOLUTE LYMPH #: 0.9 K/UL (ref 1–4.8)
ABSOLUTE MONO #: 0.2 K/UL (ref 0.2–0.8)
ANION GAP SERPL CALCULATED.3IONS-SCNC: 11 MMOL/L (ref 9–17)
BASOPHILS # BLD: 0 % (ref 0–2)
BASOPHILS ABSOLUTE: 0 K/UL (ref 0–0.2)
BUN BLDV-MCNC: 14 MG/DL (ref 8–23)
BUN/CREAT BLD: 14 (ref 9–20)
CALCIUM SERPL-MCNC: 9 MG/DL (ref 8.6–10.4)
CHLORIDE BLD-SCNC: 103 MMOL/L (ref 98–107)
CO2: 24 MMOL/L (ref 20–31)
CREAT SERPL-MCNC: 1.03 MG/DL (ref 0.7–1.2)
DIFFERENTIAL TYPE: ABNORMAL
EOSINOPHILS RELATIVE PERCENT: 0 % (ref 1–4)
GFR AFRICAN AMERICAN: >60 ML/MIN
GFR NON-AFRICAN AMERICAN: >60 ML/MIN
GFR SERPL CREATININE-BSD FRML MDRD: ABNORMAL ML/MIN/{1.73_M2}
GFR SERPL CREATININE-BSD FRML MDRD: ABNORMAL ML/MIN/{1.73_M2}
GLUCOSE BLD-MCNC: 324 MG/DL (ref 70–99)
GLUCOSE BLD-MCNC: 411 MG/DL (ref 75–110)
GLUCOSE BLD-MCNC: 424 MG/DL (ref 75–110)
GLUCOSE BLD-MCNC: 441 MG/DL (ref 75–110)
GLUCOSE BLD-MCNC: 452 MG/DL (ref 75–110)
HCT VFR BLD CALC: 35.5 % (ref 41–53)
HEMOGLOBIN: 11.3 G/DL (ref 13.5–17.5)
IMMATURE GRANULOCYTES: ABNORMAL %
LYMPHOCYTES # BLD: 7 % (ref 24–44)
MCH RBC QN AUTO: 27.9 PG (ref 26–34)
MCHC RBC AUTO-ENTMCNC: 31.8 G/DL (ref 31–37)
MCV RBC AUTO: 87.7 FL (ref 80–100)
MONOCYTES # BLD: 2 % (ref 1–7)
PDW BLD-RTO: 13.8 % (ref 11.5–14.5)
PLATELET # BLD: 596 K/UL (ref 130–400)
PLATELET ESTIMATE: ABNORMAL
PMV BLD AUTO: ABNORMAL FL (ref 6–12)
POTASSIUM SERPL-SCNC: 4.9 MMOL/L (ref 3.7–5.3)
RBC # BLD: 4.05 M/UL (ref 4.5–5.9)
RBC # BLD: ABNORMAL 10*6/UL
SEG NEUTROPHILS: 91 % (ref 36–66)
SEGMENTED NEUTROPHILS ABSOLUTE COUNT: 12.4 K/UL (ref 1.8–7.7)
SODIUM BLD-SCNC: 138 MMOL/L (ref 135–144)
WBC # BLD: 13.5 K/UL (ref 3.5–11)
WBC # BLD: ABNORMAL 10*3/UL

## 2017-12-22 PROCEDURE — 71010 XR CHEST PORTABLE: CPT

## 2017-12-22 PROCEDURE — 6360000002 HC RX W HCPCS: Performed by: INTERNAL MEDICINE

## 2017-12-22 PROCEDURE — 6370000000 HC RX 637 (ALT 250 FOR IP): Performed by: INTERNAL MEDICINE

## 2017-12-22 PROCEDURE — 82947 ASSAY GLUCOSE BLOOD QUANT: CPT

## 2017-12-22 PROCEDURE — 6370000000 HC RX 637 (ALT 250 FOR IP): Performed by: NURSE PRACTITIONER

## 2017-12-22 PROCEDURE — 80048 BASIC METABOLIC PNL TOTAL CA: CPT

## 2017-12-22 PROCEDURE — 94640 AIRWAY INHALATION TREATMENT: CPT

## 2017-12-22 PROCEDURE — 2580000003 HC RX 258: Performed by: INTERNAL MEDICINE

## 2017-12-22 PROCEDURE — 36415 COLL VENOUS BLD VENIPUNCTURE: CPT

## 2017-12-22 PROCEDURE — 94761 N-INVAS EAR/PLS OXIMETRY MLT: CPT

## 2017-12-22 PROCEDURE — 99232 SBSQ HOSP IP/OBS MODERATE 35: CPT | Performed by: INTERNAL MEDICINE

## 2017-12-22 PROCEDURE — 2500000003 HC RX 250 WO HCPCS: Performed by: INTERNAL MEDICINE

## 2017-12-22 PROCEDURE — 2060000000 HC ICU INTERMEDIATE R&B

## 2017-12-22 PROCEDURE — 85025 COMPLETE CBC W/AUTO DIFF WBC: CPT

## 2017-12-22 RX ORDER — CALCIUM CARBONATE 200(500)MG
500 TABLET,CHEWABLE ORAL 3 TIMES DAILY PRN
Status: DISCONTINUED | OUTPATIENT
Start: 2017-12-22 | End: 2017-12-24 | Stop reason: HOSPADM

## 2017-12-22 RX ORDER — PREDNISONE 20 MG/1
20 TABLET ORAL 2 TIMES DAILY
Status: DISCONTINUED | OUTPATIENT
Start: 2017-12-22 | End: 2017-12-23

## 2017-12-22 RX ADMIN — CLOPIDOGREL BISULFATE 75 MG: 75 TABLET ORAL at 10:04

## 2017-12-22 RX ADMIN — ENOXAPARIN SODIUM 70 MG: 80 INJECTION SUBCUTANEOUS at 10:04

## 2017-12-22 RX ADMIN — INSULIN LISPRO 9 UNITS: 100 INJECTION, SOLUTION INTRAVENOUS; SUBCUTANEOUS at 21:39

## 2017-12-22 RX ADMIN — INSULIN LISPRO 18 UNITS: 100 INJECTION, SOLUTION INTRAVENOUS; SUBCUTANEOUS at 17:56

## 2017-12-22 RX ADMIN — FERROUS SULFATE TAB EC 325 MG (65 MG FE EQUIVALENT) 325 MG: 325 (65 FE) TABLET DELAYED RESPONSE at 10:04

## 2017-12-22 RX ADMIN — HYDROCODONE BITARTRATE AND ACETAMINOPHEN 2 TABLET: 5; 325 TABLET ORAL at 04:23

## 2017-12-22 RX ADMIN — AZITHROMYCIN MONOHYDRATE 500 MG: 500 INJECTION, POWDER, LYOPHILIZED, FOR SOLUTION INTRAVENOUS at 00:20

## 2017-12-22 RX ADMIN — INSULIN LISPRO 4 UNITS: 100 INJECTION, SOLUTION INTRAVENOUS; SUBCUTANEOUS at 00:20

## 2017-12-22 RX ADMIN — PREDNISONE 20 MG: 20 TABLET ORAL at 21:34

## 2017-12-22 RX ADMIN — ATORVASTATIN CALCIUM 10 MG: 10 TABLET, FILM COATED ORAL at 10:04

## 2017-12-22 RX ADMIN — ACETYLCYSTEINE 200 MG: 200 SOLUTION ORAL; RESPIRATORY (INHALATION) at 19:34

## 2017-12-22 RX ADMIN — AMLODIPINE BESYLATE 5 MG: 5 TABLET ORAL at 10:04

## 2017-12-22 RX ADMIN — TAZOBACTAM SODIUM AND PIPERACILLIN SODIUM 3.38 G: 375; 3 INJECTION, SOLUTION INTRAVENOUS at 19:33

## 2017-12-22 RX ADMIN — PANTOPRAZOLE SODIUM 40 MG: 40 TABLET, DELAYED RELEASE ORAL at 10:03

## 2017-12-22 RX ADMIN — IPRATROPIUM BROMIDE AND ALBUTEROL SULFATE 1 AMPULE: .5; 3 SOLUTION RESPIRATORY (INHALATION) at 11:41

## 2017-12-22 RX ADMIN — IPRATROPIUM BROMIDE AND ALBUTEROL SULFATE 1 AMPULE: .5; 3 SOLUTION RESPIRATORY (INHALATION) at 15:02

## 2017-12-22 RX ADMIN — GABAPENTIN 100 MG: 100 CAPSULE ORAL at 10:03

## 2017-12-22 RX ADMIN — HYDROCODONE BITARTRATE AND ACETAMINOPHEN 2 TABLET: 5; 325 TABLET ORAL at 10:03

## 2017-12-22 RX ADMIN — ACETYLCYSTEINE 200 MG: 200 SOLUTION ORAL; RESPIRATORY (INHALATION) at 08:17

## 2017-12-22 RX ADMIN — HYDROCODONE BITARTRATE AND ACETAMINOPHEN 2 TABLET: 5; 325 TABLET ORAL at 21:34

## 2017-12-22 RX ADMIN — GABAPENTIN 100 MG: 100 CAPSULE ORAL at 21:35

## 2017-12-22 RX ADMIN — HYDROCODONE BITARTRATE AND ACETAMINOPHEN 2 TABLET: 5; 325 TABLET ORAL at 00:04

## 2017-12-22 RX ADMIN — MAGNESIUM HYDROXIDE 30 ML: 400 SUSPENSION ORAL at 21:34

## 2017-12-22 RX ADMIN — TAMSULOSIN HYDROCHLORIDE 0.4 MG: 0.4 CAPSULE ORAL at 10:03

## 2017-12-22 RX ADMIN — LISINOPRIL 10 MG: 10 TABLET ORAL at 10:04

## 2017-12-22 RX ADMIN — TAZOBACTAM SODIUM AND PIPERACILLIN SODIUM 3.38 G: 375; 3 INJECTION, SOLUTION INTRAVENOUS at 10:13

## 2017-12-22 RX ADMIN — TAZOBACTAM SODIUM AND PIPERACILLIN SODIUM 3.38 G: 375; 3 INJECTION, SOLUTION INTRAVENOUS at 03:33

## 2017-12-22 RX ADMIN — METOPROLOL TARTRATE 25 MG: 25 TABLET ORAL at 10:03

## 2017-12-22 RX ADMIN — TRAZODONE HYDROCHLORIDE 50 MG: 50 TABLET ORAL at 21:35

## 2017-12-22 RX ADMIN — HYDROCODONE BITARTRATE AND ACETAMINOPHEN 2 TABLET: 5; 325 TABLET ORAL at 16:52

## 2017-12-22 RX ADMIN — IPRATROPIUM BROMIDE AND ALBUTEROL SULFATE 1 AMPULE: .5; 3 SOLUTION RESPIRATORY (INHALATION) at 19:33

## 2017-12-22 RX ADMIN — METHYLPREDNISOLONE SODIUM SUCCINATE 40 MG: 40 INJECTION, POWDER, FOR SOLUTION INTRAMUSCULAR; INTRAVENOUS at 03:33

## 2017-12-22 RX ADMIN — ENOXAPARIN SODIUM 70 MG: 80 INJECTION SUBCUTANEOUS at 21:35

## 2017-12-22 RX ADMIN — METOPROLOL TARTRATE 25 MG: 25 TABLET ORAL at 21:35

## 2017-12-22 RX ADMIN — INSULIN LISPRO 25 UNITS: 100 INJECTION, SOLUTION INTRAVENOUS; SUBCUTANEOUS at 13:04

## 2017-12-22 RX ADMIN — PREDNISONE 20 MG: 20 TABLET ORAL at 13:08

## 2017-12-22 RX ADMIN — ASPIRIN 81 MG: 81 TABLET, COATED ORAL at 10:04

## 2017-12-22 RX ADMIN — IPRATROPIUM BROMIDE AND ALBUTEROL SULFATE 1 AMPULE: .5; 3 SOLUTION RESPIRATORY (INHALATION) at 08:17

## 2017-12-22 RX ADMIN — INSULIN LISPRO 18 UNITS: 100 INJECTION, SOLUTION INTRAVENOUS; SUBCUTANEOUS at 13:07

## 2017-12-22 RX ADMIN — FERROUS SULFATE TAB EC 325 MG (65 MG FE EQUIVALENT) 325 MG: 325 (65 FE) TABLET DELAYED RESPONSE at 17:56

## 2017-12-22 RX ADMIN — DOCUSATE SODIUM 100 MG: 100 CAPSULE, LIQUID FILLED ORAL at 21:35

## 2017-12-22 RX ADMIN — GABAPENTIN 100 MG: 100 CAPSULE ORAL at 13:02

## 2017-12-22 RX ADMIN — INSULIN LISPRO 12 UNITS: 100 INJECTION, SOLUTION INTRAVENOUS; SUBCUTANEOUS at 10:10

## 2017-12-22 RX ADMIN — METHYLPREDNISOLONE SODIUM SUCCINATE 40 MG: 40 INJECTION, POWDER, FOR SOLUTION INTRAMUSCULAR; INTRAVENOUS at 10:08

## 2017-12-22 ASSESSMENT — PAIN SCALES - GENERAL
PAINLEVEL_OUTOF10: 8
PAINLEVEL_OUTOF10: 7
PAINLEVEL_OUTOF10: 7
PAINLEVEL_OUTOF10: 4
PAINLEVEL_OUTOF10: 7
PAINLEVEL_OUTOF10: 7
PAINLEVEL_OUTOF10: 2
PAINLEVEL_OUTOF10: 8
PAINLEVEL_OUTOF10: 7
PAINLEVEL_OUTOF10: 0

## 2017-12-22 ASSESSMENT — PAIN DESCRIPTION - PAIN TYPE
TYPE: CHRONIC PAIN

## 2017-12-22 ASSESSMENT — PAIN DESCRIPTION - LOCATION
LOCATION: LEG
LOCATION: GENERALIZED
LOCATION: GENERALIZED

## 2017-12-22 ASSESSMENT — PAIN DESCRIPTION - FREQUENCY: FREQUENCY: CONTINUOUS

## 2017-12-22 NOTE — PLAN OF CARE
[facility]    Second Visit Note  For more detailed information please refer to the progress note of the day      12/22/2017    5:43 PM    Name:   Royanne Barthel  MRN:     [id number]     Acct:      [de-identified]   Room:   [facility] Day:  2  Admit Date:  12/20/2017  7:58 PM    PCP:   Jasen Short MD  Code Status:  Full Code        Pt vitals were reviewed   New labs were reviewed   Patient was seen    Updated plan :     1. Cont same-abx, steroids, aerosols  2. On lovenox for PE, switch to eliquis at dc-covered by insurance  3.  Likely dc 24h        Lexis Flatten Blood, DO  12/22/2017  5:43 PM

## 2017-12-22 NOTE — PLAN OF CARE
Problem: Falls - Risk of  Goal: Absence of falls  Outcome: Ongoing  The patient remained free from falls this shift, call light within reach, bed in locked and lowest position. Side rails up x2. Continue to monitor closely. Problem: Risk for Impaired Skin Integrity  Goal: Tissue integrity - skin and mucous membranes  Structural intactness and normal physiological function of skin and  mucous membranes. Outcome: Ongoing  No new skin breakdown noted. Pt able to move on bed per self without limitations. Problem: Pain:  Goal: Control of chronic pain  Control of chronic pain   Outcome: Ongoing   Patient rates pain using 0 - 10 scale. Patient alerts staff when pain medications are needed. Patient medicated - See MAR.
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
## 2017-12-22 NOTE — PROGRESS NOTES
Pulmonary Critical Care Progress Note  Renetta Serna MD     Patient seen for the follow up of Pneumonia, active smoking, Other pulmonary embolism without acute cor pulmonale ([address]     Subjective:  He denies chest pain. Mild occasional cough, mostly dry. Shortness of breath is getting better. He is tolerating ambulation     Examination:  Vitals: /61   Pulse 86   Temp 98.2 °F (36.8 °C) (Infrared)   Resp 20   Ht 6' 1\" (1.854 m)   Wt 160 lb 12.8 oz (72.9 kg)   SpO2 95%   BMI 21.22 kg/m²   General appearance: alert and cooperative with exam  Neck: No JVD  Lungs: diminished breath sounds bibasilar and rhonchi posterior - left  Heart: regular rate and rhythm, S1, S2 normal, no gallop  Abdomen: Soft, non tender, + BS  Extremities: no cyanosis or clubbing.  No significant edema    LABs:  CBC:   Recent Labs      12/21/17   0406  12/22/17   0455   WBC  10.8  13.5*   HGB  10.8*  11.3*   HCT  33.5*  35.5*   PLT  454*  596*     BMP:   Recent Labs      12/21/17   0406  12/22/17   0455   NA  140  138   K  3.8  4.9   CO2  25  24   BUN  20  14   CREATININE  1.08  1.03   LABGLOM  >60  >60   GLUCOSE  219*  324*     PT/INR:   Recent Labs      12/20/17 2015   PROTIME  9.9   INR  1.0     APTT:  Recent Labs      12/21/17   0406  12/21/17   1101   APTT  34.0*  30.6     LIVER PROFILE:  Recent Labs      12/21/17 0406   AST  <5   ALT  5   LABALBU  2.6*     ABG:  Lab Results   Component Value Date    FIO2 21.0 01/16/2017       Lab Results   Component Value Date    FIO2 21.0 01/16/2017     Radiology:      Impression:  · Acute respiratory failure  · Pulmonary embolism  · Left upper lobe pneumonia/nodular infiltrates  · Active smoking, around 30-40-pack-year smoking  · DM/PVD status post right BKA  · Dyslipidemia/HTN    Recommendations:  · Continue IV antibiotics, Zosyn plus Zithromax  · Discontinue IV Solu-Medrol  · Prednisone taper  · Albuterol and Ipratropium Q 4 hours and prn  · Dulera 200  · Labs: CBC and BMP in am  · BiPAP  · 2 liters/min via nasal cannula  · Follow-up CT of the chest noncontrast in 6-8 weeks  · DVT prophylaxis, on therapeutic Lovenox  · Will follow with you    Samantha Gonzalez MD, [facility]  Pulmonary Critical Care and Sleep Medicine,  [facility]  Cell: [phone]  Office: [phone]

## 2017-12-23 LAB
ABSOLUTE EOS #: 0 K/UL (ref 0–0.4)
ABSOLUTE IMMATURE GRANULOCYTE: ABNORMAL K/UL (ref 0–0.3)
ABSOLUTE LYMPH #: 1 K/UL (ref 1–4.8)
ABSOLUTE MONO #: 0.3 K/UL (ref 0.2–0.8)
ANION GAP SERPL CALCULATED.3IONS-SCNC: 13 MMOL/L (ref 9–17)
BASOPHILS # BLD: 1 % (ref 0–2)
BASOPHILS ABSOLUTE: 0.1 K/UL (ref 0–0.2)
BUN BLDV-MCNC: 21 MG/DL (ref 8–23)
BUN/CREAT BLD: 23 (ref 9–20)
CALCIUM SERPL-MCNC: 8.7 MG/DL (ref 8.6–10.4)
CHLORIDE BLD-SCNC: 99 MMOL/L (ref 98–107)
CO2: 24 MMOL/L (ref 20–31)
CREAT SERPL-MCNC: 0.92 MG/DL (ref 0.7–1.2)
CULTURE: ABNORMAL
CULTURE: ABNORMAL
DIFFERENTIAL TYPE: ABNORMAL
DIRECT EXAM: ABNORMAL
EOSINOPHILS RELATIVE PERCENT: 0 % (ref 1–4)
GFR AFRICAN AMERICAN: >60 ML/MIN
GFR NON-AFRICAN AMERICAN: >60 ML/MIN
GFR SERPL CREATININE-BSD FRML MDRD: ABNORMAL ML/MIN/{1.73_M2}
GFR SERPL CREATININE-BSD FRML MDRD: ABNORMAL ML/MIN/{1.73_M2}
GLUCOSE BLD-MCNC: 166 MG/DL (ref 75–110)
GLUCOSE BLD-MCNC: 369 MG/DL (ref 75–110)
GLUCOSE BLD-MCNC: 414 MG/DL (ref 75–110)
GLUCOSE BLD-MCNC: 425 MG/DL (ref 70–99)
HCT VFR BLD CALC: 36.9 % (ref 41–53)
HEMOGLOBIN: 11.9 G/DL (ref 13.5–17.5)
IMMATURE GRANULOCYTES: ABNORMAL %
LYMPHOCYTES # BLD: 5 % (ref 24–44)
Lab: ABNORMAL
MCH RBC QN AUTO: 28.6 PG (ref 26–34)
MCHC RBC AUTO-ENTMCNC: 32.4 G/DL (ref 31–37)
MCV RBC AUTO: 88.5 FL (ref 80–100)
MONOCYTES # BLD: 2 % (ref 1–7)
PDW BLD-RTO: 14 % (ref 11.5–14.5)
PLATELET # BLD: 587 K/UL (ref 130–400)
PLATELET ESTIMATE: ABNORMAL
PMV BLD AUTO: ABNORMAL FL (ref 6–12)
POTASSIUM SERPL-SCNC: 4.7 MMOL/L (ref 3.7–5.3)
RBC # BLD: 4.17 M/UL (ref 4.5–5.9)
RBC # BLD: ABNORMAL 10*6/UL
SEG NEUTROPHILS: 92 % (ref 36–66)
SEGMENTED NEUTROPHILS ABSOLUTE COUNT: 17.3 K/UL (ref 1.8–7.7)
SODIUM BLD-SCNC: 136 MMOL/L (ref 135–144)
SPECIMEN DESCRIPTION: ABNORMAL
SPECIMEN DESCRIPTION: ABNORMAL
STATUS: ABNORMAL
WBC # BLD: 18.7 K/UL (ref 3.5–11)
WBC # BLD: ABNORMAL 10*3/UL

## 2017-12-23 PROCEDURE — 80048 BASIC METABOLIC PNL TOTAL CA: CPT

## 2017-12-23 PROCEDURE — 36415 COLL VENOUS BLD VENIPUNCTURE: CPT

## 2017-12-23 PROCEDURE — 6370000000 HC RX 637 (ALT 250 FOR IP): Performed by: INTERNAL MEDICINE

## 2017-12-23 PROCEDURE — 94640 AIRWAY INHALATION TREATMENT: CPT

## 2017-12-23 PROCEDURE — 2580000003 HC RX 258: Performed by: INTERNAL MEDICINE

## 2017-12-23 PROCEDURE — 85025 COMPLETE CBC W/AUTO DIFF WBC: CPT

## 2017-12-23 PROCEDURE — 99232 SBSQ HOSP IP/OBS MODERATE 35: CPT | Performed by: INTERNAL MEDICINE

## 2017-12-23 PROCEDURE — 6360000002 HC RX W HCPCS: Performed by: INTERNAL MEDICINE

## 2017-12-23 PROCEDURE — 94664 DEMO&/EVAL PT USE INHALER: CPT

## 2017-12-23 PROCEDURE — 82947 ASSAY GLUCOSE BLOOD QUANT: CPT

## 2017-12-23 PROCEDURE — 94761 N-INVAS EAR/PLS OXIMETRY MLT: CPT

## 2017-12-23 PROCEDURE — 2500000003 HC RX 250 WO HCPCS: Performed by: INTERNAL MEDICINE

## 2017-12-23 PROCEDURE — 2060000000 HC ICU INTERMEDIATE R&B

## 2017-12-23 RX ORDER — PREDNISONE 20 MG/1
20 TABLET ORAL DAILY
Status: DISCONTINUED | OUTPATIENT
Start: 2017-12-24 | End: 2017-12-24 | Stop reason: HOSPADM

## 2017-12-23 RX ORDER — GUAIFENESIN 600 MG/1
1200 TABLET, EXTENDED RELEASE ORAL 2 TIMES DAILY
Status: DISCONTINUED | OUTPATIENT
Start: 2017-12-23 | End: 2017-12-24 | Stop reason: HOSPADM

## 2017-12-23 RX ORDER — ALBUTEROL SULFATE 90 UG/1
1 AEROSOL, METERED RESPIRATORY (INHALATION) 4 TIMES DAILY
Status: DISCONTINUED | OUTPATIENT
Start: 2017-12-23 | End: 2017-12-24 | Stop reason: HOSPADM

## 2017-12-23 RX ADMIN — ALBUTEROL SULFATE 1 PUFF: 90 AEROSOL, METERED RESPIRATORY (INHALATION) at 11:40

## 2017-12-23 RX ADMIN — Medication 10 ML: at 19:29

## 2017-12-23 RX ADMIN — HYDROCODONE BITARTRATE AND ACETAMINOPHEN 2 TABLET: 5; 325 TABLET ORAL at 12:25

## 2017-12-23 RX ADMIN — INSULIN LISPRO 15 UNITS: 100 INJECTION, SOLUTION INTRAVENOUS; SUBCUTANEOUS at 11:25

## 2017-12-23 RX ADMIN — INSULIN LISPRO 18 UNITS: 100 INJECTION, SOLUTION INTRAVENOUS; SUBCUTANEOUS at 08:58

## 2017-12-23 RX ADMIN — ENOXAPARIN SODIUM 70 MG: 80 INJECTION SUBCUTANEOUS at 08:45

## 2017-12-23 RX ADMIN — PANTOPRAZOLE SODIUM 40 MG: 40 TABLET, DELAYED RELEASE ORAL at 08:45

## 2017-12-23 RX ADMIN — GUAIFENESIN 1200 MG: 600 TABLET, EXTENDED RELEASE ORAL at 21:00

## 2017-12-23 RX ADMIN — LISINOPRIL 10 MG: 10 TABLET ORAL at 08:45

## 2017-12-23 RX ADMIN — PREDNISONE 20 MG: 20 TABLET ORAL at 08:45

## 2017-12-23 RX ADMIN — CLOPIDOGREL BISULFATE 75 MG: 75 TABLET ORAL at 08:45

## 2017-12-23 RX ADMIN — Medication 1 PUFF: at 19:35

## 2017-12-23 RX ADMIN — AMLODIPINE BESYLATE 5 MG: 5 TABLET ORAL at 08:45

## 2017-12-23 RX ADMIN — GUAIFENESIN 1200 MG: 600 TABLET, EXTENDED RELEASE ORAL at 12:18

## 2017-12-23 RX ADMIN — IPRATROPIUM BROMIDE AND ALBUTEROL SULFATE 1 AMPULE: .5; 3 SOLUTION RESPIRATORY (INHALATION) at 07:44

## 2017-12-23 RX ADMIN — METOPROLOL TARTRATE 25 MG: 25 TABLET ORAL at 21:00

## 2017-12-23 RX ADMIN — ASPIRIN 81 MG: 81 TABLET, COATED ORAL at 08:45

## 2017-12-23 RX ADMIN — GABAPENTIN 100 MG: 100 CAPSULE ORAL at 21:01

## 2017-12-23 RX ADMIN — MAGNESIUM HYDROXIDE 30 ML: 400 SUSPENSION ORAL at 18:44

## 2017-12-23 RX ADMIN — FERROUS SULFATE TAB EC 325 MG (65 MG FE EQUIVALENT) 325 MG: 325 (65 FE) TABLET DELAYED RESPONSE at 18:39

## 2017-12-23 RX ADMIN — DOCUSATE SODIUM 100 MG: 100 CAPSULE, LIQUID FILLED ORAL at 08:45

## 2017-12-23 RX ADMIN — HYDROCODONE BITARTRATE AND ACETAMINOPHEN 2 TABLET: 5; 325 TABLET ORAL at 02:14

## 2017-12-23 RX ADMIN — HYDROCODONE BITARTRATE AND ACETAMINOPHEN 2 TABLET: 5; 325 TABLET ORAL at 08:21

## 2017-12-23 RX ADMIN — AZITHROMYCIN MONOHYDRATE 500 MG: 500 INJECTION, POWDER, LYOPHILIZED, FOR SOLUTION INTRAVENOUS at 01:21

## 2017-12-23 RX ADMIN — ENOXAPARIN SODIUM 70 MG: 80 INJECTION SUBCUTANEOUS at 21:00

## 2017-12-23 RX ADMIN — TAZOBACTAM SODIUM AND PIPERACILLIN SODIUM 3.38 G: 375; 3 INJECTION, SOLUTION INTRAVENOUS at 11:31

## 2017-12-23 RX ADMIN — TAMSULOSIN HYDROCHLORIDE 0.4 MG: 0.4 CAPSULE ORAL at 08:44

## 2017-12-23 RX ADMIN — ATORVASTATIN CALCIUM 10 MG: 10 TABLET, FILM COATED ORAL at 08:45

## 2017-12-23 RX ADMIN — INSULIN LISPRO 2 UNITS: 100 INJECTION, SOLUTION INTRAVENOUS; SUBCUTANEOUS at 22:24

## 2017-12-23 RX ADMIN — METOPROLOL TARTRATE 25 MG: 25 TABLET ORAL at 08:45

## 2017-12-23 RX ADMIN — HYDROCODONE BITARTRATE AND ACETAMINOPHEN 1 TABLET: 5; 325 TABLET ORAL at 18:44

## 2017-12-23 RX ADMIN — TAZOBACTAM SODIUM AND PIPERACILLIN SODIUM 3.38 G: 375; 3 INJECTION, SOLUTION INTRAVENOUS at 19:28

## 2017-12-23 RX ADMIN — FLUTICASONE PROPIONATE 1 SPRAY: 50 SPRAY, METERED NASAL at 21:02

## 2017-12-23 RX ADMIN — INSULIN LISPRO 18 UNITS: 100 INJECTION, SOLUTION INTRAVENOUS; SUBCUTANEOUS at 18:43

## 2017-12-23 RX ADMIN — FERROUS SULFATE TAB EC 325 MG (65 MG FE EQUIVALENT) 325 MG: 325 (65 FE) TABLET DELAYED RESPONSE at 08:45

## 2017-12-23 RX ADMIN — TAZOBACTAM SODIUM AND PIPERACILLIN SODIUM 3.38 G: 375; 3 INJECTION, SOLUTION INTRAVENOUS at 04:56

## 2017-12-23 RX ADMIN — TRAZODONE HYDROCHLORIDE 50 MG: 50 TABLET ORAL at 21:00

## 2017-12-23 ASSESSMENT — PAIN DESCRIPTION - ORIENTATION: ORIENTATION: RIGHT;LEFT

## 2017-12-23 ASSESSMENT — PAIN DESCRIPTION - LOCATION
LOCATION: LEG
LOCATION: OTHER (COMMENT)

## 2017-12-23 ASSESSMENT — PAIN SCALES - GENERAL
PAINLEVEL_OUTOF10: 9
PAINLEVEL_OUTOF10: 7

## 2017-12-23 ASSESSMENT — PAIN DESCRIPTION - PAIN TYPE
TYPE: CHRONIC PAIN
TYPE: CHRONIC PAIN

## 2017-12-23 ASSESSMENT — PAIN DESCRIPTION - FREQUENCY
FREQUENCY: CONTINUOUS
FREQUENCY: CONTINUOUS

## 2017-12-23 ASSESSMENT — PAIN DESCRIPTION - DESCRIPTORS
DESCRIPTORS: SHARP
DESCRIPTORS: SHARP

## 2017-12-23 NOTE — PLAN OF CARE
Problem: Falls - Risk of  Goal: Absence of falls  Outcome: Ongoing  Uses call light appropriately, fall precautions in place will continue to monitor. Problem: Risk for Impaired Skin Integrity  Goal: Tissue integrity - skin and mucous membranes  Structural intactness and normal physiological function of skin and  mucous membranes. Outcome: Ongoing  Continue to monitor skin integrity and IV sites    Problem: Nutrition  Goal: Optimal nutrition therapy  Nutrition Problem: Unintended weight loss  Intervention: Food and/or Nutrient Delivery: Continue current diet, Start ONS  Nutritional Goals: Adherence to CHO controlled diet with weight maitenance or intentional wt loss     Outcome: Ongoing      Problem: Pain:  Goal: Pain level will decrease  Pain level will decrease   Outcome: Ongoing  Patient will have progressive improvement with pain scale with interventions.   Goal: Control of acute pain  Control of acute pain   Outcome: Ongoing    Goal: Control of chronic pain  Control of chronic pain   Outcome: Ongoing  Repositioning, extra pillows, and medication (see MAR) to control chronic pain
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
## 2017-12-24 VITALS
BODY MASS INDEX: 21.31 KG/M2 | TEMPERATURE: 97.9 F | RESPIRATION RATE: 18 BRPM | WEIGHT: 160.8 LBS | HEART RATE: 87 BPM | SYSTOLIC BLOOD PRESSURE: 113 MMHG | HEIGHT: 73 IN | DIASTOLIC BLOOD PRESSURE: 65 MMHG | OXYGEN SATURATION: 94 %

## 2017-12-24 LAB
ANION GAP SERPL CALCULATED.3IONS-SCNC: 9 MMOL/L (ref 9–17)
BUN BLDV-MCNC: 22 MG/DL (ref 8–23)
BUN/CREAT BLD: 21 (ref 9–20)
CALCIUM SERPL-MCNC: 8.4 MG/DL (ref 8.6–10.4)
CHLORIDE BLD-SCNC: 102 MMOL/L (ref 98–107)
CO2: 31 MMOL/L (ref 20–31)
CREAT SERPL-MCNC: 1.04 MG/DL (ref 0.7–1.2)
GFR AFRICAN AMERICAN: >60 ML/MIN
GFR NON-AFRICAN AMERICAN: >60 ML/MIN
GFR SERPL CREATININE-BSD FRML MDRD: ABNORMAL ML/MIN/{1.73_M2}
GFR SERPL CREATININE-BSD FRML MDRD: ABNORMAL ML/MIN/{1.73_M2}
GLUCOSE BLD-MCNC: 171 MG/DL (ref 75–110)
GLUCOSE BLD-MCNC: 210 MG/DL (ref 75–110)
GLUCOSE BLD-MCNC: 235 MG/DL (ref 70–99)
HCT VFR BLD CALC: 35.8 % (ref 41–53)
HEMOGLOBIN: 11.7 G/DL (ref 13.5–17.5)
MCH RBC QN AUTO: 29 PG (ref 26–34)
MCHC RBC AUTO-ENTMCNC: 32.6 G/DL (ref 31–37)
MCV RBC AUTO: 89 FL (ref 80–100)
PDW BLD-RTO: 13.9 % (ref 11.5–14.5)
PLATELET # BLD: 630 K/UL (ref 130–400)
PMV BLD AUTO: ABNORMAL FL (ref 6–12)
POTASSIUM SERPL-SCNC: 4.6 MMOL/L (ref 3.7–5.3)
RBC # BLD: 4.02 M/UL (ref 4.5–5.9)
SODIUM BLD-SCNC: 142 MMOL/L (ref 135–144)
WBC # BLD: 12.6 K/UL (ref 3.5–11)

## 2017-12-24 PROCEDURE — 6360000002 HC RX W HCPCS: Performed by: INTERNAL MEDICINE

## 2017-12-24 PROCEDURE — 6370000000 HC RX 637 (ALT 250 FOR IP): Performed by: INTERNAL MEDICINE

## 2017-12-24 PROCEDURE — 80048 BASIC METABOLIC PNL TOTAL CA: CPT

## 2017-12-24 PROCEDURE — 94760 N-INVAS EAR/PLS OXIMETRY 1: CPT

## 2017-12-24 PROCEDURE — 94640 AIRWAY INHALATION TREATMENT: CPT

## 2017-12-24 PROCEDURE — 2580000003 HC RX 258: Performed by: INTERNAL MEDICINE

## 2017-12-24 PROCEDURE — 82947 ASSAY GLUCOSE BLOOD QUANT: CPT

## 2017-12-24 PROCEDURE — 99232 SBSQ HOSP IP/OBS MODERATE 35: CPT | Performed by: INTERNAL MEDICINE

## 2017-12-24 PROCEDURE — 85027 COMPLETE CBC AUTOMATED: CPT

## 2017-12-24 PROCEDURE — 36415 COLL VENOUS BLD VENIPUNCTURE: CPT

## 2017-12-24 PROCEDURE — 2500000003 HC RX 250 WO HCPCS: Performed by: INTERNAL MEDICINE

## 2017-12-24 RX ORDER — AMOXICILLIN AND CLAVULANATE POTASSIUM 875; 125 MG/1; MG/1
1 TABLET, FILM COATED ORAL EVERY 12 HOURS SCHEDULED
Status: DISCONTINUED | OUTPATIENT
Start: 2017-12-24 | End: 2017-12-24 | Stop reason: HOSPADM

## 2017-12-24 RX ORDER — PREDNISONE 1 MG/1
TABLET ORAL
Qty: 30 TABLET | Refills: 0 | Status: ON HOLD | OUTPATIENT
Start: 2017-12-24 | End: 2018-01-23 | Stop reason: HOSPADM

## 2017-12-24 RX ORDER — AMOXICILLIN AND CLAVULANATE POTASSIUM 875; 125 MG/1; MG/1
1 TABLET, FILM COATED ORAL EVERY 12 HOURS SCHEDULED
Qty: 14 TABLET | Refills: 0 | Status: SHIPPED | OUTPATIENT
Start: 2017-12-24 | End: 2017-12-31

## 2017-12-24 RX ORDER — GUAIFENESIN 600 MG/1
1200 TABLET, EXTENDED RELEASE ORAL 2 TIMES DAILY
Qty: 40 TABLET | Refills: 1 | Status: ON HOLD | OUTPATIENT
Start: 2017-12-24 | End: 2018-01-23

## 2017-12-24 RX ADMIN — AMLODIPINE BESYLATE 5 MG: 5 TABLET ORAL at 09:23

## 2017-12-24 RX ADMIN — PREDNISONE 20 MG: 20 TABLET ORAL at 09:23

## 2017-12-24 RX ADMIN — ENOXAPARIN SODIUM 70 MG: 80 INJECTION SUBCUTANEOUS at 09:23

## 2017-12-24 RX ADMIN — INSULIN LISPRO 3 UNITS: 100 INJECTION, SOLUTION INTRAVENOUS; SUBCUTANEOUS at 09:24

## 2017-12-24 RX ADMIN — GABAPENTIN 100 MG: 100 CAPSULE ORAL at 09:22

## 2017-12-24 RX ADMIN — LISINOPRIL 10 MG: 10 TABLET ORAL at 09:22

## 2017-12-24 RX ADMIN — TAZOBACTAM SODIUM AND PIPERACILLIN SODIUM 3.38 G: 375; 3 INJECTION, SOLUTION INTRAVENOUS at 03:25

## 2017-12-24 RX ADMIN — HYDROCODONE BITARTRATE AND ACETAMINOPHEN 2 TABLET: 5; 325 TABLET ORAL at 01:12

## 2017-12-24 RX ADMIN — ASPIRIN 81 MG: 81 TABLET, COATED ORAL at 09:23

## 2017-12-24 RX ADMIN — GUAIFENESIN 1200 MG: 600 TABLET, EXTENDED RELEASE ORAL at 09:22

## 2017-12-24 RX ADMIN — GABAPENTIN 100 MG: 100 CAPSULE ORAL at 13:10

## 2017-12-24 RX ADMIN — TAMSULOSIN HYDROCHLORIDE 0.4 MG: 0.4 CAPSULE ORAL at 09:22

## 2017-12-24 RX ADMIN — AZITHROMYCIN MONOHYDRATE 500 MG: 500 INJECTION, POWDER, LYOPHILIZED, FOR SOLUTION INTRAVENOUS at 01:06

## 2017-12-24 RX ADMIN — Medication 1 PUFF: at 11:48

## 2017-12-24 RX ADMIN — INSULIN LISPRO 6 UNITS: 100 INJECTION, SOLUTION INTRAVENOUS; SUBCUTANEOUS at 13:10

## 2017-12-24 RX ADMIN — CLOPIDOGREL BISULFATE 75 MG: 75 TABLET ORAL at 09:23

## 2017-12-24 RX ADMIN — AMOXICILLIN AND CLAVULANATE POTASSIUM 1 TABLET: 875; 125 TABLET, FILM COATED ORAL at 12:03

## 2017-12-24 RX ADMIN — METOPROLOL TARTRATE 25 MG: 25 TABLET ORAL at 09:23

## 2017-12-24 RX ADMIN — FERROUS SULFATE TAB EC 325 MG (65 MG FE EQUIVALENT) 325 MG: 325 (65 FE) TABLET DELAYED RESPONSE at 09:23

## 2017-12-24 RX ADMIN — HYDROCODONE BITARTRATE AND ACETAMINOPHEN 2 TABLET: 5; 325 TABLET ORAL at 12:03

## 2017-12-24 RX ADMIN — ATORVASTATIN CALCIUM 10 MG: 10 TABLET, FILM COATED ORAL at 09:22

## 2017-12-24 RX ADMIN — PANTOPRAZOLE SODIUM 40 MG: 40 TABLET, DELAYED RELEASE ORAL at 09:23

## 2017-12-24 RX ADMIN — HYDROCODONE BITARTRATE AND ACETAMINOPHEN 2 TABLET: 5; 325 TABLET ORAL at 06:03

## 2017-12-24 RX ADMIN — ALBUTEROL SULFATE 2.5 MG: 2.5 SOLUTION RESPIRATORY (INHALATION) at 08:31

## 2017-12-24 ASSESSMENT — PAIN DESCRIPTION - PAIN TYPE
TYPE: CHRONIC PAIN
TYPE: CHRONIC PAIN

## 2017-12-24 ASSESSMENT — PAIN DESCRIPTION - ORIENTATION
ORIENTATION: RIGHT;LEFT
ORIENTATION: RIGHT;LEFT

## 2017-12-24 ASSESSMENT — PAIN DESCRIPTION - LOCATION
LOCATION: LEG
LOCATION: LEG

## 2017-12-24 ASSESSMENT — PAIN SCALES - GENERAL
PAINLEVEL_OUTOF10: 7
PAINLEVEL_OUTOF10: 6

## 2017-12-24 NOTE — PROGRESS NOTES
Discharge instructions were given and explained to the pt. Pt verbalized understanding of instructions, medications, and the importance of follow-up appointments. Pt was given a coupon free 30 day supply of eliquis. Pt discharged with all personal belongings.

## 2017-12-24 NOTE — PLAN OF CARE
Problem: Falls - Risk of  Goal: Absence of falls  Outcome: Ongoing  Falling star program in place. Side rails up x2. Call light and personal belongings within reach. Continuing to maintain safe environment. Bed in lowest position and locked. Appropriate Identification armbands in place. Non-skid foot wear in place. Problem: Pain:  Goal: Pain level will decrease  Pain level will decrease   Outcome: Ongoing  Assess pain level with each assessment & PRN by use of 0-10 pain scale. Non-pharmacological pain measures encouraged prior to using pharmacological pain measures.

## 2017-12-24 NOTE — DISCHARGE INSTR - DIET
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
## 2017-12-24 NOTE — DISCHARGE SUMMARY
[facility]    Discharge Summary     Patient ID: Wayne Ugarte  :  1955   MRN: [id number]     ACCOUNT:  [de-identified]   Patient's PCP: Corazon Abreu MD  Admit Date: 2017   Discharge Date: 2017     Length of Stay: 4  Code Status:  Full Code  Admitting Physician: Milly Quach DO  Discharge Physician: Milly Quach DO     Active Discharge Diagnoses:     Primary Problem  Other pulmonary embolism without acute cor pulmonale [facility])      [facility] Problems    Diagnosis Date Noted    COPD exacerbation ([facility] [J44.1] 2017    Other pulmonary embolism without acute cor pulmonale ([facility] [I26.99] 2017    Benign essential HTN [I10]     PAD (peripheral artery disease) ([facility] [I73.9]     Chronic obstructive pulmonary disease ([facility] [J44.9]     DM type 2 with diabetic peripheral neuropathy ([facility] [E11.42]     [facility] [C15.9] 10/23/2014    GERD (gastroesophageal reflux disease) [K21.9]     Tobacco abuse [Z72.0]        Admission Condition:  fair     Discharged Condition: stable    Hospital Stay:     Hospital Course:  Wayne Ugarte is a 63 y.o. male who was admitted for the management of   Other pulmonary embolism without acute cor pulmonale (HCC) , presented to ER with Shortness of Breath (onset 3 days) and Dizziness    The patient is a 63 y.o.   male who presents with Shortness of Breath (onset 3 days) and Dizziness   and he is admitted to the hospital for the management of  Sob.  h/o es Mucinexophageal cancer-had surgery, no chemo or XRT.  He has had solitario/sob and cough with white-yellow phlegm.  Also coughed up some food and had n/v/chills.       Passed a swallow study on     Patient was treated with IV Zosyn and Zithromax and placed on Lovenox for his blood clot.   Ultimately he is able to be switched to oral Augmentin for treatment of the pneumonia and transitioned to Mild cardiomegaly. No pulmonary edema. Persistent left upper lobe opacities likely pneumonia. Recommend continued follow-up. Small pleural effusions. Ct Chest Pulmonary Embolism W Contrast    Result Date: 12/20/2017  EXAMINATION: CTA OF THE CHEST 12/20/2017 9:29 pm TECHNIQUE: CTA of the chest was performed after the administration of intravenous contrast.  Multiplanar reformatted images are provided for review. MIP images are provided for review. Dose modulation, iterative reconstruction, and/or weight based adjustment of the mA/kV was utilized to reduce the radiation dose to as low as reasonably achievable. COMPARISON: Chest x-ray from today and CT chest January 20, 2017 HISTORY: ORDERING SYSTEM PROVIDED HISTORY: cp sob hx of esophageal ca, elev d dimer. WAIT ON LABS FINDINGS: Pulmonary Arteries: Pulmonary arteries are adequately opacified for evaluation. No evidence of intraluminal filling defect to suggest pulmonary embolism except for a nonocclusive linear filling defect within a segmental branch of the left upper lobe. Main pulmonary artery is normal in caliber. Mediastinum: No evidence of mediastinal lymphadenopathy. The heart and pericardium demonstrate no acute abnormality. There is no acute abnormality of the thoracic aorta. There is calcific coronary artery disease. There appears to be a gastric pull-through. There is a moderately large hiatal hernia. Lungs/pleura: There is left upper lobe airspace disease which is new. Pleuroparenchymal densities are again noted in the left lower lobe which appears unchanged. There is a small effusion on the right or pleural reaction. Upper Abdomen: Limited images of the upper abdomen are unremarkable. Soft Tissues/Bones: No acute bone or soft tissue abnormality. Partial filling defect segmental branch left upper lobe pulmonary artery. Consider chronic thromboembolic disease. New left upper lobe airspace disease.   Differential considerations include chronic veno-occlusive disease, pneumonia, neoplasm, or inflammatory disease. Recommend follow-up to resolution. Stable pleuroparenchymal airspace disease left lower lobe. Other incidental findings as noted above including gastric pull-through. RECOMMENDATIONS: Case discussed with the emergency physician at the time of the exam.     Vl Lower Extremity Bilateral Venous Duplex    Result Date: 12/21/2017    [facility]  Vascular Lower Extremities DVT Study Procedure   Patient Name    Javed Brock     Date of Study             12/21/2017                  Mala Dharmesh   Date of Birth   1955   Gender                    Male   Age             63 year(s)   Race                         Room Number     1115   Corporate ID #  [id number]   Patient Acct #  [de-identified]   MR #            [id number]      Sonographer               Reba Kelley   Accession #     [id number]    Interpreting Physician    Colton Damian   Referring Nurse              Referring Physician       Monse Uriostegui,  Practitioner  Procedure Type of Study:   Veins: Lower Extremities DVT Study, Venous Scan Lower Bilateral.  Indications for Study:Pulmonary Embolism. Comments:Right BKA. Findings:   Right Impression:                     Left Impression:  The common femoral, femoral,          The common femoral, femoral,  popliteal, proximal and mid saphenous popliteal, tibials and saphenous  veins are compressible with normal    veins are compressible with normal  doppler responses. doppler responses. Enlarged lymph nodes are noted at the Enlarged lymph nodes are noted at  right groin level. the left groin level. Patient Status: In Patient. Technical Quality:Good visualization. Velocities are measured in cm/s ; Diameters are measured in mm Right Lower Extremities DVT Study Measurements Right 2D Measurements +------------------------------------+----------+---------------+----------+ ! Location +------------------------------------+----------+---------------+----------+ ! Peroneal                            !Yes       ! Yes            ! None      ! +------------------------------------+----------+---------------+----------+ ! Gastroc                             ! Yes       ! Yes            ! None      ! +------------------------------------+----------+---------------+----------+ ! GSV Thigh                           ! Yes       ! Yes            ! None      ! +------------------------------------+----------+---------------+----------+ ! GSV Knee                            ! Yes       ! Yes            ! None      ! +------------------------------------+----------+---------------+----------+ ! GSV Ankle                           ! Yes       ! Yes            ! None      ! +------------------------------------+----------+---------------+----------+ ! SSV                                 ! Yes       ! Yes            ! None      ! +------------------------------------+----------+---------------+----------+ Left Doppler Measurements +---------------------------+------+------+--------------------------------+ ! Location                   ! Signal!Reflux! Reflux (msec)                   ! +---------------------------+------+------+--------------------------------+ ! Common Femoral             !Phasic!      !                                ! +---------------------------+------+------+--------------------------------+ ! Prox Femoral               !Phasic!      !                                ! +---------------------------+------+------+--------------------------------+ ! Popliteal                  !Phasic!      !                                ! +---------------------------+------+------+--------------------------------+  Conclusions   Summary   No evidence of superficial or deep venous thrombosis in both lower  extremities. Edema noted. Enlarged lymph nodes at the groins.    Signature ----------------------------------------------------------------  Electronically signed by Reba Kelley(Sonographer) on  12/21/2017 08:13 AM  ----------------------------------------------------------------   ----------------------------------------------------------------  Electronically signed by Gayla Larry(Interpreting  physician) on 12/21/2017 09:47 PM  ----------------------------------------------------------------      Fl Modified Barium Swallow W Video    Result Date: 12/21/2017  EXAMINATION: MODIFIED BARIUM SWALLOW WAS PERFORMED IN CONJUNCTION WITH SPEECH PATHOLOGY SERVICES TECHNIQUE: Fluoroscopic evaluation of the swallowing mechanism was performed with multiple consistency of barium product. FLUOROSCOPY DOSE AND TYPE OR TIME AND EXPOSURES: 2 minutes, 1 image COMPARISON: None HISTORY: ORDERING SYSTEM PROVIDED HISTORY: possible aspiration TECHNOLOGIST PROVIDED HISTORY: Reason for exam:->possible aspiration Ordering Physician Provided Reason for Exam: dysphagia Acuity: Unknown Type of Exam: Unknown FINDINGS: Premature vallecular spillage is seen throughout the examination. No evidence of laryngeal penetration or aspiration. No evidence of aspiration or penetration. Please see separate speech pathology report for full discussion of findings and recommendations. Consultations:    Consults:     Final Specialist Recommendations/Findings:   IP CONSULT TO INTERNAL MEDICINE  IP CONSULT TO DIETITIAN  IP CONSULT TO PULMONOLOGY      The patient was seen and examined on day of discharge and this discharge summary is in conjunction with any daily progress note from day of discharge. Discharge plan:     Disposition: Home    Physician Follow Up:    Alex Hayden MD  [facility] [address]  [phone]    In 1 week      Dominga [facility]  [phone]    In 1 month         Requiring Further Evaluation/Follow Up POST HOSPITALIZATION/Incidental Findings: Follow-up chest x-ray at discretion pulmonary    Diet: diabetic diet    Activity: As tolerated    Instructions to Patient: Take medications as prescribed    Discharge Medications:      Medication List      START taking these medications    amoxicillin-clavulanate 875-125 MG per tablet  Commonly known as:  AUGMENTIN  Take 1 tablet by mouth every 12 hours for 7 days     apixaban 5 MG Tabs tablet  Commonly known as:  ELIQUIS  2 tablets twice a day for 7 days, then change to 1 tablet twice daily     guaiFENesin 600 MG extended release tablet  Commonly known as:  MUCINEX  Take 2 tablets by mouth 2 times daily     predniSONE 5 MG tablet  Commonly known as:  DELTASONE  4 tablets daily for 3 days, 3 tablets daily for 3 days, 2 tablets daily for 3 days, 1 tablet daily for 3 days        CONTINUE taking these medications    albuterol sulfate  (90 Base) MCG/ACT inhaler  Commonly known as:  VENTOLIN HFA  INHALE 2 PUFFS INTO THE LUNGS EVERY 6 HOURS AS NEEDED FOR WHEEZING     amLODIPine 5 MG tablet  Commonly known as:  NORVASC  Take 1 tablet by mouth daily Hold if SBP <100     aspirin EC 81 MG EC tablet  Take 1 tablet by mouth daily     atorvastatin 10 MG tablet  Commonly known as:  LIPITOR  TAKE 1 TABLET BY MOUTH DAILY     clopidogrel 75 MG tablet  Commonly known as:  PLAVIX  Take 1 tablet by mouth daily     esomeprazole 40 MG delayed release capsule  Commonly known as:  NEXIUM  TAKE ONE CAPSULE BY MOUTH TWICE DAILY     ferrous sulfate 325 (65 Fe) MG EC tablet  Take 1 tablet by mouth 2 times daily (with meals)     fluticasone 50 MCG/ACT nasal spray  Commonly known as:  FLONASE  1 spray by Nasal route daily as needed for Allergies (during allergy season)     gabapentin 100 MG capsule  Commonly known as:  NEURONTIN  Take 1 capsule by mouth 3 times daily     insulin aspart 100 UNIT/ML injection pen  Commonly known as:  NOVOLOG FLEXPEN  Inject 5 Units into the skin 3 times daily (before meals)     insulin glargine 300 UNIT/ML

## 2017-12-26 ENCOUNTER — HOSPITAL ENCOUNTER (INPATIENT)
Age: 62
LOS: 1 days | Discharge: NURSING, REHAB OR LONG-TERM CARE | DRG: 194 | End: 2017-12-27
Attending: EMERGENCY MEDICINE | Admitting: FAMILY MEDICINE
Payer: MEDICARE

## 2017-12-26 ENCOUNTER — TELEPHONE (OUTPATIENT)
Dept: PHARMACY | Age: 62
End: 2017-12-26

## 2017-12-26 ENCOUNTER — APPOINTMENT (OUTPATIENT)
Dept: GENERAL RADIOLOGY | Age: 62
DRG: 194 | End: 2017-12-26
Payer: MEDICARE

## 2017-12-26 DIAGNOSIS — J15.9 BACTERIAL PNEUMONIA: ICD-10-CM

## 2017-12-26 DIAGNOSIS — D72.829 LEUKOCYTOSIS, UNSPECIFIED TYPE: ICD-10-CM

## 2017-12-26 DIAGNOSIS — J18.9 PNEUMONIA DUE TO ORGANISM: Primary | ICD-10-CM

## 2017-12-26 LAB
-: ABNORMAL
ABSOLUTE EOS #: 0 K/UL (ref 0–0.4)
ABSOLUTE IMMATURE GRANULOCYTE: ABNORMAL K/UL (ref 0–0.3)
ABSOLUTE LYMPH #: 3.69 K/UL (ref 1–4.8)
ABSOLUTE MONO #: 0.97 K/UL (ref 0.2–0.8)
AMORPHOUS: ABNORMAL
ANION GAP SERPL CALCULATED.3IONS-SCNC: 13 MMOL/L (ref 9–17)
BACTERIA: ABNORMAL
BASOPHILS # BLD: 0 %
BASOPHILS ABSOLUTE: 0 K/UL (ref 0–0.2)
BILIRUBIN URINE: NEGATIVE
BUN BLDV-MCNC: 21 MG/DL (ref 8–23)
BUN/CREAT BLD: 23 (ref 9–20)
CALCIUM SERPL-MCNC: 8.7 MG/DL (ref 8.6–10.4)
CASTS UA: ABNORMAL /LPF
CHLORIDE BLD-SCNC: 98 MMOL/L (ref 98–107)
CO2: 29 MMOL/L (ref 20–31)
COLOR: YELLOW
COMMENT UA: ABNORMAL
CREAT SERPL-MCNC: 0.9 MG/DL (ref 0.7–1.2)
CRYSTALS, UA: ABNORMAL /HPF
CULTURE: NORMAL
DIFFERENTIAL TYPE: ABNORMAL
EOSINOPHILS RELATIVE PERCENT: 0 % (ref 1–4)
EPITHELIAL CELLS UA: ABNORMAL /HPF (ref 0–5)
GFR AFRICAN AMERICAN: >60 ML/MIN
GFR NON-AFRICAN AMERICAN: >60 ML/MIN
GFR SERPL CREATININE-BSD FRML MDRD: ABNORMAL ML/MIN/{1.73_M2}
GFR SERPL CREATININE-BSD FRML MDRD: ABNORMAL ML/MIN/{1.73_M2}
GLUCOSE BLD-MCNC: 264 MG/DL (ref 70–99)
GLUCOSE BLD-MCNC: 302 MG/DL (ref 75–110)
GLUCOSE URINE: ABNORMAL
HCT VFR BLD CALC: 44.4 % (ref 41–53)
HEMOGLOBIN: 14.3 G/DL (ref 13.5–17.5)
IMMATURE GRANULOCYTES: ABNORMAL %
KETONES, URINE: NEGATIVE
LACTIC ACID: 1.1 MMOL/L (ref 0.5–2.2)
LEUKOCYTE ESTERASE, URINE: NEGATIVE
LYMPHOCYTES # BLD: 19 % (ref 24–44)
Lab: NORMAL
Lab: NORMAL
MCH RBC QN AUTO: 28.8 PG (ref 26–34)
MCHC RBC AUTO-ENTMCNC: 32.2 G/DL (ref 31–37)
MCV RBC AUTO: 89.4 FL (ref 80–100)
MONOCYTES # BLD: 5 % (ref 1–7)
MUCUS: ABNORMAL
NITRITE, URINE: NEGATIVE
OTHER OBSERVATIONS UA: ABNORMAL
PDW BLD-RTO: 13.8 % (ref 11.5–14.5)
PH UA: 6 (ref 5–8)
PLATELET # BLD: 751 K/UL (ref 130–400)
PLATELET ESTIMATE: ABNORMAL
PMV BLD AUTO: ABNORMAL FL (ref 6–12)
POTASSIUM SERPL-SCNC: 4.2 MMOL/L (ref 3.7–5.3)
PROTEIN UA: ABNORMAL
RBC # BLD: 4.96 M/UL (ref 4.5–5.9)
RBC # BLD: ABNORMAL 10*6/UL
RBC UA: ABNORMAL /HPF (ref 0–2)
RENAL EPITHELIAL, UA: ABNORMAL /HPF
SEG NEUTROPHILS: 76 % (ref 36–66)
SEGMENTED NEUTROPHILS ABSOLUTE COUNT: 14.74 K/UL (ref 1.8–7.7)
SODIUM BLD-SCNC: 140 MMOL/L (ref 135–144)
SPECIFIC GRAVITY UA: 1.03 (ref 1–1.03)
SPECIMEN DESCRIPTION: NORMAL
STATUS: NORMAL
STATUS: NORMAL
TRICHOMONAS: ABNORMAL
TURBIDITY: CLEAR
URINE HGB: NEGATIVE
UROBILINOGEN, URINE: NORMAL
WBC # BLD: 19.4 K/UL (ref 3.5–11)
WBC # BLD: ABNORMAL 10*3/UL
WBC UA: ABNORMAL /HPF (ref 0–5)
YEAST: ABNORMAL

## 2017-12-26 PROCEDURE — 81001 URINALYSIS AUTO W/SCOPE: CPT

## 2017-12-26 PROCEDURE — 6370000000 HC RX 637 (ALT 250 FOR IP): Performed by: NURSE PRACTITIONER

## 2017-12-26 PROCEDURE — 96376 TX/PRO/DX INJ SAME DRUG ADON: CPT

## 2017-12-26 PROCEDURE — 71020 XR CHEST STANDARD TWO VW: CPT

## 2017-12-26 PROCEDURE — 87040 BLOOD CULTURE FOR BACTERIA: CPT

## 2017-12-26 PROCEDURE — 82947 ASSAY GLUCOSE BLOOD QUANT: CPT

## 2017-12-26 PROCEDURE — 94640 AIRWAY INHALATION TREATMENT: CPT

## 2017-12-26 PROCEDURE — 99285 EMERGENCY DEPT VISIT HI MDM: CPT

## 2017-12-26 PROCEDURE — 2580000003 HC RX 258: Performed by: NURSE PRACTITIONER

## 2017-12-26 PROCEDURE — 80048 BASIC METABOLIC PNL TOTAL CA: CPT

## 2017-12-26 PROCEDURE — 6360000002 HC RX W HCPCS: Performed by: NURSE PRACTITIONER

## 2017-12-26 PROCEDURE — 83605 ASSAY OF LACTIC ACID: CPT

## 2017-12-26 PROCEDURE — 96375 TX/PRO/DX INJ NEW DRUG ADDON: CPT

## 2017-12-26 PROCEDURE — 85025 COMPLETE CBC W/AUTO DIFF WBC: CPT

## 2017-12-26 PROCEDURE — 96365 THER/PROPH/DIAG IV INF INIT: CPT

## 2017-12-26 PROCEDURE — 1200000000 HC SEMI PRIVATE

## 2017-12-26 RX ORDER — TRAMADOL HYDROCHLORIDE 50 MG/1
50 TABLET ORAL EVERY 6 HOURS PRN
Status: DISCONTINUED | OUTPATIENT
Start: 2017-12-26 | End: 2017-12-27 | Stop reason: HOSPADM

## 2017-12-26 RX ORDER — SENNA PLUS 8.6 MG/1
2 TABLET ORAL NIGHTLY PRN
Status: DISCONTINUED | OUTPATIENT
Start: 2017-12-26 | End: 2017-12-27 | Stop reason: HOSPADM

## 2017-12-26 RX ORDER — DEXTROSE MONOHYDRATE 25 G/50ML
12.5 INJECTION, SOLUTION INTRAVENOUS PRN
Status: DISCONTINUED | OUTPATIENT
Start: 2017-12-26 | End: 2017-12-27 | Stop reason: HOSPADM

## 2017-12-26 RX ORDER — FAMOTIDINE 20 MG/1
20 TABLET, FILM COATED ORAL 2 TIMES DAILY
Status: DISCONTINUED | OUTPATIENT
Start: 2017-12-26 | End: 2017-12-27 | Stop reason: HOSPADM

## 2017-12-26 RX ORDER — ATORVASTATIN CALCIUM 10 MG/1
10 TABLET, FILM COATED ORAL DAILY
Status: DISCONTINUED | OUTPATIENT
Start: 2017-12-27 | End: 2017-12-27 | Stop reason: HOSPADM

## 2017-12-26 RX ORDER — SODIUM CHLORIDE 0.9 % (FLUSH) 0.9 %
10 SYRINGE (ML) INJECTION EVERY 12 HOURS SCHEDULED
Status: DISCONTINUED | OUTPATIENT
Start: 2017-12-26 | End: 2017-12-27 | Stop reason: HOSPADM

## 2017-12-26 RX ORDER — BISACODYL 10 MG
10 SUPPOSITORY, RECTAL RECTAL DAILY PRN
Status: DISCONTINUED | OUTPATIENT
Start: 2017-12-26 | End: 2017-12-27 | Stop reason: HOSPADM

## 2017-12-26 RX ORDER — ACETAMINOPHEN 325 MG/1
650 TABLET ORAL EVERY 4 HOURS PRN
Status: DISCONTINUED | OUTPATIENT
Start: 2017-12-26 | End: 2017-12-27 | Stop reason: HOSPADM

## 2017-12-26 RX ORDER — ONDANSETRON 2 MG/ML
4 INJECTION INTRAMUSCULAR; INTRAVENOUS ONCE
Status: COMPLETED | OUTPATIENT
Start: 2017-12-26 | End: 2017-12-26

## 2017-12-26 RX ORDER — SODIUM CHLORIDE 9 MG/ML
INJECTION, SOLUTION INTRAVENOUS CONTINUOUS
Status: DISCONTINUED | OUTPATIENT
Start: 2017-12-26 | End: 2017-12-27 | Stop reason: HOSPADM

## 2017-12-26 RX ORDER — ASPIRIN 81 MG/1
81 TABLET ORAL DAILY
Status: DISCONTINUED | OUTPATIENT
Start: 2017-12-27 | End: 2017-12-27 | Stop reason: HOSPADM

## 2017-12-26 RX ORDER — MORPHINE SULFATE 4 MG/ML
4 INJECTION, SOLUTION INTRAMUSCULAR; INTRAVENOUS ONCE
Status: COMPLETED | OUTPATIENT
Start: 2017-12-26 | End: 2017-12-26

## 2017-12-26 RX ORDER — NICOTINE POLACRILEX 4 MG
15 LOZENGE BUCCAL PRN
Status: DISCONTINUED | OUTPATIENT
Start: 2017-12-26 | End: 2017-12-27 | Stop reason: HOSPADM

## 2017-12-26 RX ORDER — AMLODIPINE BESYLATE 5 MG/1
5 TABLET ORAL DAILY
Status: DISCONTINUED | OUTPATIENT
Start: 2017-12-27 | End: 2017-12-27 | Stop reason: HOSPADM

## 2017-12-26 RX ORDER — SODIUM CHLORIDE 0.9 % (FLUSH) 0.9 %
10 SYRINGE (ML) INJECTION PRN
Status: DISCONTINUED | OUTPATIENT
Start: 2017-12-26 | End: 2017-12-27 | Stop reason: HOSPADM

## 2017-12-26 RX ORDER — FLUTICASONE PROPIONATE 50 MCG
1 SPRAY, SUSPENSION (ML) NASAL DAILY PRN
Status: DISCONTINUED | OUTPATIENT
Start: 2017-12-26 | End: 2017-12-27 | Stop reason: HOSPADM

## 2017-12-26 RX ORDER — DEXTROSE MONOHYDRATE 50 MG/ML
100 INJECTION, SOLUTION INTRAVENOUS PRN
Status: DISCONTINUED | OUTPATIENT
Start: 2017-12-26 | End: 2017-12-27 | Stop reason: HOSPADM

## 2017-12-26 RX ORDER — NICOTINE 21 MG/24HR
1 PATCH, TRANSDERMAL 24 HOURS TRANSDERMAL DAILY PRN
Status: DISCONTINUED | OUTPATIENT
Start: 2017-12-26 | End: 2017-12-27 | Stop reason: HOSPADM

## 2017-12-26 RX ORDER — HYDROCODONE BITARTRATE AND ACETAMINOPHEN 5; 325 MG/1; MG/1
1 TABLET ORAL EVERY 4 HOURS PRN
Status: DISCONTINUED | OUTPATIENT
Start: 2017-12-26 | End: 2017-12-27 | Stop reason: HOSPADM

## 2017-12-26 RX ORDER — HYDROCODONE BITARTRATE AND ACETAMINOPHEN 5; 325 MG/1; MG/1
2 TABLET ORAL EVERY 4 HOURS PRN
Status: DISCONTINUED | OUTPATIENT
Start: 2017-12-26 | End: 2017-12-27 | Stop reason: HOSPADM

## 2017-12-26 RX ORDER — 0.9 % SODIUM CHLORIDE 0.9 %
500 INTRAVENOUS SOLUTION INTRAVENOUS ONCE
Status: COMPLETED | OUTPATIENT
Start: 2017-12-26 | End: 2017-12-26

## 2017-12-26 RX ORDER — GUAIFENESIN DEXTROMETHORPHAN HYDROBROMIDE ORAL SOLUTION 10; 100 MG/5ML; MG/5ML
10 SOLUTION ORAL EVERY 4 HOURS PRN
Status: DISCONTINUED | OUTPATIENT
Start: 2017-12-26 | End: 2017-12-26 | Stop reason: CLARIF

## 2017-12-26 RX ORDER — TRAMADOL HYDROCHLORIDE 50 MG/1
50 TABLET ORAL EVERY 6 HOURS PRN
Status: DISCONTINUED | OUTPATIENT
Start: 2017-12-26 | End: 2017-12-26

## 2017-12-26 RX ORDER — ALBUTEROL SULFATE 2.5 MG/3ML
2.5 SOLUTION RESPIRATORY (INHALATION) ONCE
Status: COMPLETED | OUTPATIENT
Start: 2017-12-26 | End: 2017-12-26

## 2017-12-26 RX ORDER — IPRATROPIUM BROMIDE AND ALBUTEROL SULFATE 2.5; .5 MG/3ML; MG/3ML
1 SOLUTION RESPIRATORY (INHALATION)
Status: DISCONTINUED | OUTPATIENT
Start: 2017-12-27 | End: 2017-12-27 | Stop reason: HOSPADM

## 2017-12-26 RX ORDER — INSULIN GLARGINE 100 [IU]/ML
60 INJECTION, SOLUTION SUBCUTANEOUS NIGHTLY
Status: DISCONTINUED | OUTPATIENT
Start: 2017-12-26 | End: 2017-12-27 | Stop reason: HOSPADM

## 2017-12-26 RX ORDER — GUAIFENESIN/DEXTROMETHORPHAN 100-10MG/5
10 SYRUP ORAL EVERY 4 HOURS PRN
Status: DISCONTINUED | OUTPATIENT
Start: 2017-12-26 | End: 2017-12-27 | Stop reason: HOSPADM

## 2017-12-26 RX ORDER — GUAIFENESIN 600 MG/1
1200 TABLET, EXTENDED RELEASE ORAL 2 TIMES DAILY
Status: DISCONTINUED | OUTPATIENT
Start: 2017-12-26 | End: 2017-12-27 | Stop reason: HOSPADM

## 2017-12-26 RX ORDER — LISINOPRIL 10 MG/1
10 TABLET ORAL DAILY
Status: DISCONTINUED | OUTPATIENT
Start: 2017-12-27 | End: 2017-12-27 | Stop reason: HOSPADM

## 2017-12-26 RX ORDER — TRAZODONE HYDROCHLORIDE 50 MG/1
50 TABLET ORAL NIGHTLY
Status: DISCONTINUED | OUTPATIENT
Start: 2017-12-26 | End: 2017-12-27 | Stop reason: HOSPADM

## 2017-12-26 RX ORDER — ONDANSETRON 2 MG/ML
4 INJECTION INTRAMUSCULAR; INTRAVENOUS EVERY 6 HOURS PRN
Status: DISCONTINUED | OUTPATIENT
Start: 2017-12-26 | End: 2017-12-27 | Stop reason: HOSPADM

## 2017-12-26 RX ORDER — ALBUTEROL SULFATE 2.5 MG/3ML
2.5 SOLUTION RESPIRATORY (INHALATION)
Status: DISCONTINUED | OUTPATIENT
Start: 2017-12-26 | End: 2017-12-27 | Stop reason: HOSPADM

## 2017-12-26 RX ORDER — LANOLIN ALCOHOL/MO/W.PET/CERES
325 CREAM (GRAM) TOPICAL 2 TIMES DAILY WITH MEALS
Status: DISCONTINUED | OUTPATIENT
Start: 2017-12-27 | End: 2017-12-27 | Stop reason: HOSPADM

## 2017-12-26 RX ORDER — TAMSULOSIN HYDROCHLORIDE 0.4 MG/1
0.4 CAPSULE ORAL DAILY
Status: DISCONTINUED | OUTPATIENT
Start: 2017-12-27 | End: 2017-12-27 | Stop reason: HOSPADM

## 2017-12-26 RX ORDER — CLOPIDOGREL BISULFATE 75 MG/1
75 TABLET ORAL DAILY
Status: DISCONTINUED | OUTPATIENT
Start: 2017-12-27 | End: 2017-12-27 | Stop reason: HOSPADM

## 2017-12-26 RX ADMIN — Medication 60 UNITS: at 22:18

## 2017-12-26 RX ADMIN — GUAIFENESIN AND DEXTROMETHORPHAN 10 ML: 100; 10 SYRUP ORAL at 23:05

## 2017-12-26 RX ADMIN — HYDROCODONE BITARTRATE AND ACETAMINOPHEN 2 TABLET: 5; 325 TABLET ORAL at 21:43

## 2017-12-26 RX ADMIN — TRAZODONE HYDROCHLORIDE 50 MG: 50 TABLET ORAL at 22:02

## 2017-12-26 RX ADMIN — MORPHINE SULFATE 4 MG: 4 INJECTION, SOLUTION INTRAMUSCULAR; INTRAVENOUS at 17:19

## 2017-12-26 RX ADMIN — AZITHROMYCIN MONOHYDRATE 500 MG: 500 INJECTION, POWDER, LYOPHILIZED, FOR SOLUTION INTRAVENOUS at 21:14

## 2017-12-26 RX ADMIN — CEFTRIAXONE SODIUM 1 G: 1 INJECTION, POWDER, FOR SOLUTION INTRAMUSCULAR; INTRAVENOUS at 19:50

## 2017-12-26 RX ADMIN — FAMOTIDINE 20 MG: 20 TABLET, FILM COATED ORAL at 22:02

## 2017-12-26 RX ADMIN — SODIUM CHLORIDE: 9 INJECTION, SOLUTION INTRAVENOUS at 22:02

## 2017-12-26 RX ADMIN — METOPROLOL TARTRATE 25 MG: 25 TABLET ORAL at 22:02

## 2017-12-26 RX ADMIN — GUAIFENESIN 1200 MG: 600 TABLET, EXTENDED RELEASE ORAL at 22:02

## 2017-12-26 RX ADMIN — APIXABAN 10 MG: 5 TABLET, FILM COATED ORAL at 22:20

## 2017-12-26 RX ADMIN — Medication 0.5 MG: at 18:27

## 2017-12-26 RX ADMIN — HYDROMORPHONE HYDROCHLORIDE 0.5 MG: 1 INJECTION, SOLUTION INTRAMUSCULAR; INTRAVENOUS; SUBCUTANEOUS at 19:40

## 2017-12-26 RX ADMIN — ONDANSETRON 4 MG: 2 INJECTION INTRAMUSCULAR; INTRAVENOUS at 17:19

## 2017-12-26 RX ADMIN — ALBUTEROL SULFATE 2.5 MG: 2.5 SOLUTION RESPIRATORY (INHALATION) at 17:23

## 2017-12-26 RX ADMIN — SODIUM CHLORIDE 500 ML: 9 INJECTION, SOLUTION INTRAVENOUS at 17:19

## 2017-12-26 ASSESSMENT — PAIN DESCRIPTION - PROGRESSION: CLINICAL_PROGRESSION: NOT CHANGED

## 2017-12-26 ASSESSMENT — ENCOUNTER SYMPTOMS
NAUSEA: 1
BLOOD IN STOOL: 0
CONSTIPATION: 0
TROUBLE SWALLOWING: 0
SHORTNESS OF BREATH: 1
SORE THROAT: 0
WHEEZING: 0
ABDOMINAL DISTENTION: 0
COUGH: 1
STRIDOR: 0
COLOR CHANGE: 0
BACK PAIN: 1
VOMITING: 1
CHEST TIGHTNESS: 1

## 2017-12-26 ASSESSMENT — PAIN DESCRIPTION - LOCATION: LOCATION: LEG;HAND

## 2017-12-26 ASSESSMENT — PAIN DESCRIPTION - DESCRIPTORS
DESCRIPTORS: SHARP;SHOOTING;THROBBING;STABBING
DESCRIPTORS: SPASM;DULL

## 2017-12-26 ASSESSMENT — PAIN DESCRIPTION - PAIN TYPE
TYPE: CHRONIC PAIN;ACUTE PAIN
TYPE: ACUTE PAIN;CHRONIC PAIN

## 2017-12-26 ASSESSMENT — PAIN SCALES - GENERAL
PAINLEVEL_OUTOF10: 10
PAINLEVEL_OUTOF10: 7
PAINLEVEL_OUTOF10: 10
PAINLEVEL_OUTOF10: 7
PAINLEVEL_OUTOF10: 5
PAINLEVEL_OUTOF10: 10
PAINLEVEL_OUTOF10: 10

## 2017-12-26 ASSESSMENT — PAIN DESCRIPTION - ORIENTATION
ORIENTATION: RIGHT;LEFT
ORIENTATION: RIGHT;LEFT

## 2017-12-26 ASSESSMENT — PAIN DESCRIPTION - FREQUENCY: FREQUENCY: INTERMITTENT

## 2017-12-26 ASSESSMENT — PAIN DESCRIPTION - ONSET: ONSET: ON-GOING
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
## 2017-12-26 NOTE — TELEPHONE ENCOUNTER
CLINICAL PHARMACY NOTE  Post-Discharge Transitions of Care (DONNELL)     Patient was discharged from [address] on 12/24/17 and was scheduled to receive a telephone review of discharge medications. Pt is currently admitted to the ER. Will attempt to contact pt at a more appropriate time.      Thank you,    Leonora Paredes, [address] [place] [address]  Medication Accuracy Service  Ph: [phone]

## 2017-12-27 ENCOUNTER — APPOINTMENT (OUTPATIENT)
Dept: GENERAL RADIOLOGY | Age: 62
DRG: 194 | End: 2017-12-27
Payer: MEDICARE

## 2017-12-27 VITALS
RESPIRATION RATE: 18 BRPM | HEART RATE: 77 BPM | BODY MASS INDEX: 21.79 KG/M2 | OXYGEN SATURATION: 94 % | WEIGHT: 164.4 LBS | DIASTOLIC BLOOD PRESSURE: 57 MMHG | TEMPERATURE: 99 F | SYSTOLIC BLOOD PRESSURE: 121 MMHG | HEIGHT: 73 IN

## 2017-12-27 PROBLEM — J15.9 BACTERIAL PNEUMONIA: Status: ACTIVE | Noted: 2017-12-26

## 2017-12-27 PROBLEM — J44.1 COPD EXACERBATION (HCC): Status: RESOLVED | Noted: 2017-12-21 | Resolved: 2017-12-27

## 2017-12-27 PROBLEM — B36.9 FUNGAL DERMATITIS: Status: ACTIVE | Noted: 2017-12-27

## 2017-12-27 LAB
ALBUMIN SERPL-MCNC: 3.1 G/DL (ref 3.5–5.2)
ALBUMIN/GLOBULIN RATIO: ABNORMAL (ref 1–2.5)
ALP BLD-CCNC: 93 U/L (ref 40–129)
ALT SERPL-CCNC: 16 U/L (ref 5–41)
ANION GAP SERPL CALCULATED.3IONS-SCNC: 9 MMOL/L (ref 9–17)
AST SERPL-CCNC: 8 U/L
BILIRUB SERPL-MCNC: 0.13 MG/DL (ref 0.3–1.2)
BUN BLDV-MCNC: 20 MG/DL (ref 8–23)
BUN/CREAT BLD: 19 (ref 9–20)
CALCIUM SERPL-MCNC: 8.2 MG/DL (ref 8.6–10.4)
CHLORIDE BLD-SCNC: 101 MMOL/L (ref 98–107)
CO2: 31 MMOL/L (ref 20–31)
CREAT SERPL-MCNC: 1.03 MG/DL (ref 0.7–1.2)
GFR AFRICAN AMERICAN: >60 ML/MIN
GFR NON-AFRICAN AMERICAN: >60 ML/MIN
GFR SERPL CREATININE-BSD FRML MDRD: ABNORMAL ML/MIN/{1.73_M2}
GFR SERPL CREATININE-BSD FRML MDRD: ABNORMAL ML/MIN/{1.73_M2}
GLUCOSE BLD-MCNC: 103 MG/DL (ref 75–110)
GLUCOSE BLD-MCNC: 119 MG/DL (ref 70–99)
GLUCOSE BLD-MCNC: 96 MG/DL (ref 75–110)
HCT VFR BLD CALC: 36.4 % (ref 41–53)
HEMOGLOBIN: 11.8 G/DL (ref 13.5–17.5)
MCH RBC QN AUTO: 28.9 PG (ref 26–34)
MCHC RBC AUTO-ENTMCNC: 32.3 G/DL (ref 31–37)
MCV RBC AUTO: 89.5 FL (ref 80–100)
PDW BLD-RTO: 13.8 % (ref 11.5–14.5)
PLATELET # BLD: 601 K/UL (ref 130–400)
PMV BLD AUTO: ABNORMAL FL (ref 6–12)
POTASSIUM SERPL-SCNC: 4 MMOL/L (ref 3.7–5.3)
RBC # BLD: 4.07 M/UL (ref 4.5–5.9)
SODIUM BLD-SCNC: 141 MMOL/L (ref 135–144)
TOTAL PROTEIN: 5.7 G/DL (ref 6.4–8.3)
WBC # BLD: 15.4 K/UL (ref 3.5–11)

## 2017-12-27 PROCEDURE — 6370000000 HC RX 637 (ALT 250 FOR IP): Performed by: INTERNAL MEDICINE

## 2017-12-27 PROCEDURE — 97162 PT EVAL MOD COMPLEX 30 MIN: CPT

## 2017-12-27 PROCEDURE — 82947 ASSAY GLUCOSE BLOOD QUANT: CPT

## 2017-12-27 PROCEDURE — 36415 COLL VENOUS BLD VENIPUNCTURE: CPT

## 2017-12-27 PROCEDURE — 2580000003 HC RX 258: Performed by: NURSE PRACTITIONER

## 2017-12-27 PROCEDURE — 80053 COMPREHEN METABOLIC PANEL: CPT

## 2017-12-27 PROCEDURE — 6370000000 HC RX 637 (ALT 250 FOR IP): Performed by: NURSE PRACTITIONER

## 2017-12-27 PROCEDURE — G8979 MOBILITY GOAL STATUS: HCPCS

## 2017-12-27 PROCEDURE — G8978 MOBILITY CURRENT STATUS: HCPCS

## 2017-12-27 PROCEDURE — 94760 N-INVAS EAR/PLS OXIMETRY 1: CPT

## 2017-12-27 PROCEDURE — 6360000002 HC RX W HCPCS: Performed by: NURSE PRACTITIONER

## 2017-12-27 PROCEDURE — 85027 COMPLETE CBC AUTOMATED: CPT

## 2017-12-27 PROCEDURE — 71020 XR CHEST STANDARD TWO VW: CPT

## 2017-12-27 PROCEDURE — 94640 AIRWAY INHALATION TREATMENT: CPT

## 2017-12-27 PROCEDURE — 99222 1ST HOSP IP/OBS MODERATE 55: CPT | Performed by: INTERNAL MEDICINE

## 2017-12-27 PROCEDURE — 97530 THERAPEUTIC ACTIVITIES: CPT

## 2017-12-27 RX ORDER — FLUCONAZOLE 100 MG/1
100 TABLET ORAL DAILY
Qty: 7 TABLET | Refills: 0 | DISCHARGE
Start: 2017-12-28 | End: 2018-01-04

## 2017-12-27 RX ORDER — TRAMADOL HYDROCHLORIDE 50 MG/1
50 TABLET ORAL EVERY 6 HOURS PRN
Qty: 20 TABLET | Refills: 0 | Status: SHIPPED | OUTPATIENT
Start: 2017-12-27 | End: 2018-01-06

## 2017-12-27 RX ORDER — AMOXICILLIN AND CLAVULANATE POTASSIUM 875; 125 MG/1; MG/1
1 TABLET, FILM COATED ORAL EVERY 12 HOURS SCHEDULED
Status: DISCONTINUED | OUTPATIENT
Start: 2017-12-27 | End: 2017-12-27 | Stop reason: HOSPADM

## 2017-12-27 RX ORDER — IPRATROPIUM BROMIDE AND ALBUTEROL SULFATE 2.5; .5 MG/3ML; MG/3ML
3 SOLUTION RESPIRATORY (INHALATION)
Qty: 360 ML | Status: ON HOLD | DISCHARGE
Start: 2017-12-27 | End: 2018-01-22 | Stop reason: ALTCHOICE

## 2017-12-27 RX ORDER — FLUCONAZOLE 100 MG/1
100 TABLET ORAL DAILY
Status: DISCONTINUED | OUTPATIENT
Start: 2017-12-27 | End: 2017-12-27 | Stop reason: HOSPADM

## 2017-12-27 RX ADMIN — TAMSULOSIN HYDROCHLORIDE 0.4 MG: 0.4 CAPSULE ORAL at 08:42

## 2017-12-27 RX ADMIN — GUAIFENESIN 1200 MG: 600 TABLET, EXTENDED RELEASE ORAL at 08:42

## 2017-12-27 RX ADMIN — APIXABAN 10 MG: 5 TABLET, FILM COATED ORAL at 08:43

## 2017-12-27 RX ADMIN — HYDROCODONE BITARTRATE AND ACETAMINOPHEN 2 TABLET: 5; 325 TABLET ORAL at 10:09

## 2017-12-27 RX ADMIN — FLUCONAZOLE 100 MG: 100 TABLET ORAL at 12:31

## 2017-12-27 RX ADMIN — GUAIFENESIN AND DEXTROMETHORPHAN 10 ML: 100; 10 SYRUP ORAL at 03:28

## 2017-12-27 RX ADMIN — INSULIN LISPRO 5 UNITS: 100 INJECTION, SOLUTION INTRAVENOUS; SUBCUTANEOUS at 08:43

## 2017-12-27 RX ADMIN — ONDANSETRON 4 MG: 2 INJECTION INTRAMUSCULAR; INTRAVENOUS at 01:47

## 2017-12-27 RX ADMIN — FERROUS SULFATE TAB EC 325 MG (65 MG FE EQUIVALENT) 325 MG: 325 (65 FE) TABLET DELAYED RESPONSE at 08:42

## 2017-12-27 RX ADMIN — HYDROCODONE BITARTRATE AND ACETAMINOPHEN 2 TABLET: 5; 325 TABLET ORAL at 06:04

## 2017-12-27 RX ADMIN — MAGNESIUM HYDROXIDE 30 ML: 400 SUSPENSION ORAL at 14:01

## 2017-12-27 RX ADMIN — METOPROLOL TARTRATE 25 MG: 25 TABLET ORAL at 08:42

## 2017-12-27 RX ADMIN — SODIUM CHLORIDE: 9 INJECTION, SOLUTION INTRAVENOUS at 03:28

## 2017-12-27 RX ADMIN — IPRATROPIUM BROMIDE AND ALBUTEROL SULFATE 1 AMPULE: .5; 3 SOLUTION RESPIRATORY (INHALATION) at 12:07

## 2017-12-27 RX ADMIN — HYDROCODONE BITARTRATE AND ACETAMINOPHEN 1 TABLET: 5; 325 TABLET ORAL at 14:50

## 2017-12-27 RX ADMIN — HYDROCODONE BITARTRATE AND ACETAMINOPHEN 2 TABLET: 5; 325 TABLET ORAL at 01:50

## 2017-12-27 RX ADMIN — LISINOPRIL 10 MG: 10 TABLET ORAL at 08:42

## 2017-12-27 RX ADMIN — ATORVASTATIN CALCIUM 10 MG: 10 TABLET, FILM COATED ORAL at 08:42

## 2017-12-27 RX ADMIN — AMLODIPINE BESYLATE 5 MG: 5 TABLET ORAL at 08:43

## 2017-12-27 RX ADMIN — FAMOTIDINE 20 MG: 20 TABLET, FILM COATED ORAL at 08:42

## 2017-12-27 RX ADMIN — ASPIRIN 81 MG: 81 TABLET, COATED ORAL at 08:43

## 2017-12-27 RX ADMIN — CLOPIDOGREL BISULFATE 75 MG: 75 TABLET ORAL at 08:42

## 2017-12-27 RX ADMIN — GUAIFENESIN AND DEXTROMETHORPHAN 10 ML: 100; 10 SYRUP ORAL at 07:04

## 2017-12-27 ASSESSMENT — PAIN SCALES - GENERAL
PAINLEVEL_OUTOF10: 7
PAINLEVEL_OUTOF10: 6
PAINLEVEL_OUTOF10: 5
PAINLEVEL_OUTOF10: 4
PAINLEVEL_OUTOF10: 7
PAINLEVEL_OUTOF10: 5
PAINLEVEL_OUTOF10: 7
PAINLEVEL_OUTOF10: 7

## 2017-12-27 ASSESSMENT — PAIN DESCRIPTION - DESCRIPTORS: DESCRIPTORS: SPASM;DULL

## 2017-12-27 ASSESSMENT — PAIN DESCRIPTION - PAIN TYPE
TYPE: ACUTE PAIN
TYPE: ACUTE PAIN;CHRONIC PAIN

## 2017-12-27 ASSESSMENT — PAIN DESCRIPTION - ORIENTATION
ORIENTATION: RIGHT;LEFT
ORIENTATION: LEFT

## 2017-12-27 ASSESSMENT — PAIN DESCRIPTION - ONSET: ONSET: ON-GOING

## 2017-12-27 ASSESSMENT — PAIN DESCRIPTION - FREQUENCY: FREQUENCY: INTERMITTENT

## 2017-12-27 ASSESSMENT — PAIN DESCRIPTION - PROGRESSION: CLINICAL_PROGRESSION: NOT CHANGED

## 2017-12-27 ASSESSMENT — PAIN DESCRIPTION - LOCATION: LOCATION: LEG

## 2017-12-27 NOTE — PLAN OF CARE
Problem: Falls - Risk of  Goal: Absence of falls  Outcome: Ongoing  Siderails up x 2  Hourly rounding. Call light in reach. Instructed to call for assist before attempting out of bed. Remains free from falls and accidental injury at this time. Floor free from obstacles, and bed is locked and in lowest position. Adequate lighting provided.           Problem: Gas Exchange - Impaired:  Goal: Levels of oxygenation will improve  Levels of oxygenation will improve   Outcome: Ongoing

## 2017-12-27 NOTE — CARE COORDINATION
Social work:  Writer spoke with admissions at [facility], they are able to accept. HENS completed.
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
## 2017-12-27 NOTE — CARE COORDINATION
Case Management Initial Discharge Plan  Sean Eppersonine,         Readmission Risk              Readmission Risk:        25.5       Age 72 or Greater:  0    Admitted from SNF or Requires Paid or Family Care:  0    Currently has CHF,COPD,ARF,CRI,or is on dialysis:  4    Takes more than 5 Prescription Medications:  4    Takes Digoxin,Insulin,Anticoagulants,Narcotics or ASA/Plavix:  [address] in Past 12 Months:  10    On Disability:  3    Patient Considers own Health:  2.5            Met with:patient to discuss discharge plans. Information verified: address, contacts, phone number, , insurance Yes  PCP: James Kline MD  Date of last visit: a few months ago    Insurance Provider: Medicare    Discharge Planning  Current Residence:     Living Arrangements:  Spouse/Significant Other   Home has 1 stories/2 stairs to climb  Support Systems:  Spouse/Significant Other  Current Services PTA:    Supplier: none  Patient able to perform ADL's:Independent  DME used to aid ambulation prior to admission: walker/during admission    Potential Assistance Needed:       Pharmacy: [facility]   Potential Assistance Purchasing Medications:  No  Does patient want to participate in local refill/ meds to beds program?  No    Patient agreeable to home care: Yes  Freedom of choice provided:  yes      Type of Home Care Services:  None  Patient expects to be discharged to:  home     Prior SNF/Rehab Placement and Facility: [facility] [address] [employer] to SNF/Rehab: Yes  [place] of choice provided: yes   Evaluation: yes    Expected Discharge date:  17  Follow Up Appointment: Best Day/ Time: Monday AM    Transportation provider: self; family  Transportation arrangements needed for discharge: Yes to SNF. Discharge Plan: Pt was independent at home prior to admission, no current services.    Discussed PT recommendation of SNF, pt is agreeable, states \"If I have to, I will.\"  Gave choices of [facility] and [facility] [place]. Referrals faxed to both facilities. Medicare, will need 3 night IP stay.          Electronically signed by HUNTER Newberry on 12/27/17 at 11:37 AM
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
## 2017-12-27 NOTE — DISCHARGE SUMMARY
[facility]    Discharge Summary     Patient ID: Sindhu Hyatt  :  1955   MRN: [id number]     ACCOUNT:  [de-identified]   Patient's PCP: Marely Banda MD  Admit Date: 2017   Discharge Date: 2017     Length of Stay: 1  Code Status:  Full Code  Admitting Physician: Starr Jim MD  Discharge Physician: Ej Kelly DO     Active Discharge Diagnoses:     Primary Problem  Bacterial pneumonia      Hospital Problems  Active Hospital Problems    Diagnosis Date Noted    Fungal dermatitis [B36.9] 2017    Bacterial pneumonia [J15.9] 2017    Benign essential HTN [I10]     DM type 2 with diabetic peripheral neuropathy ([facility] [E11.42]     Hyperlipidemia [E78.5]        Admission Condition:  fair     Discharged Condition: stable    Hospital Stay:     Hospital Course:  Sindhu Hyatt is a 63 y.o. male who was admitted for the management of   Bacterial pneumonia , presented to ER with Shortness of Breath (with vomiting since yesterday(discharged from [facility] yesterday))    This is a 73-year-old white male who is readmitted to the hospital with left upper lobe pneumonia after recent treatment. He was discharged on Augmentin and return to the hospital with shortness of breath and vomiting. His x-ray showed improvement in his pneumonia, he was not hypoxic and had no respiratory distress. Ultimately therapy is recommending skilled nursing facility and he'll be discharged to skilled nursing facility for completion of antibiotics and follow-up chest x-ray and treatment.       Significant therapeutic interventions: As above    Significant Diagnostic Studies:   Labs / Micro:  CBC:   Lab Results   Component Value Date    WBC 15.4 2017    RBC 4.07 2017    RBC 4.32 2012    HGB 11.8 2017    HCT 36.4 2017    MCV 89.5 2017    MCH 28.9 2017    MCHC 32.3 2017    RDW 13.8 2017     HISTORY: ORDERING SYSTEM PROVIDED HISTORY: short of breath TECHNOLOGIST PROVIDED HISTORY: Reason for exam:->short of breath Ordering Physician Provided Reason for Exam: SOB Relevant Medical/Surgical History: COPD    esophageal cancer FINDINGS: The cardiomediastinal silhouette is stable. New airspace disease in the left upper lobe. Right-sided pleural effusion. 1. New airspace disease in the left upper lobe possibly representing pneumonia. Follow-up is recommended to ensure resolution 2. Small right pleural effusion     Xr Chest Portable    Result Date: 12/22/2017  EXAMINATION: SINGLE VIEW OF THE CHEST 12/22/2017 6:30 am COMPARISON: December 20, 2017 HISTORY: ORDERING SYSTEM PROVIDED HISTORY: infiltrate TECHNOLOGIST PROVIDED HISTORY: Reason for exam:->infiltrate Ordering Physician Provided Reason for Exam: infiltrate Acuity: Unknown Type of Exam: Unknown Acute/subsequent encounter FINDINGS: Persistent left upper lobe opacities. Small pleural effusions. Mild cardiomegaly. No pulmonary edema. Persistent left upper lobe opacities likely pneumonia. Recommend continued follow-up. Small pleural effusions. Ct Chest Pulmonary Embolism W Contrast    Result Date: 12/20/2017  EXAMINATION: CTA OF THE CHEST 12/20/2017 9:29 pm TECHNIQUE: CTA of the chest was performed after the administration of intravenous contrast.  Multiplanar reformatted images are provided for review. MIP images are provided for review. Dose modulation, iterative reconstruction, and/or weight based adjustment of the mA/kV was utilized to reduce the radiation dose to as low as reasonably achievable. COMPARISON: Chest x-ray from today and CT chest January 20, 2017 HISTORY: ORDERING SYSTEM PROVIDED HISTORY: cp sob hx of esophageal ca, elev d dimer. WAIT ON LABS FINDINGS: Pulmonary Arteries: Pulmonary arteries are adequately opacified for evaluation.   No evidence of intraluminal filling defect to suggest pulmonary embolism except for a nonocclusive linear filling defect within a segmental branch of the left upper lobe. Main pulmonary artery is normal in caliber. Mediastinum: No evidence of mediastinal lymphadenopathy. The heart and pericardium demonstrate no acute abnormality. There is no acute abnormality of the thoracic aorta. There is calcific coronary artery disease. There appears to be a gastric pull-through. There is a moderately large hiatal hernia. Lungs/pleura: There is left upper lobe airspace disease which is new. Pleuroparenchymal densities are again noted in the left lower lobe which appears unchanged. There is a small effusion on the right or pleural reaction. Upper Abdomen: Limited images of the upper abdomen are unremarkable. Soft Tissues/Bones: No acute bone or soft tissue abnormality. Partial filling defect segmental branch left upper lobe pulmonary artery. Consider chronic thromboembolic disease. New left upper lobe airspace disease. Differential considerations include chronic veno-occlusive disease, pneumonia, neoplasm, or inflammatory disease. Recommend follow-up to resolution. Stable pleuroparenchymal airspace disease left lower lobe. Other incidental findings as noted above including gastric pull-through.  RECOMMENDATIONS: Case discussed with the emergency physician at the time of the exam.     Vl Lower Extremity Bilateral Venous Duplex    Result Date: 12/21/2017    [facility]  Vascular Lower Extremities DVT Study Procedure   Patient Name    Jean-Pierre Lopez     Date of Study             12/21/2017                  Neal Sample   Date of Birth   1955   Gender                    Male   Age             63 year(s)   Race                         Room Number     7701   Corporate ID #  [id number]   Patient Acct #  [de-identified]   MR #            [id number]      Sonographer               Reba Kelley   Accession #     [id number]    Interpreting Physician    Evalene Closs   Referring Nurse              Referring Physician       [address] [facility] BLOOD,  Practitioner  Procedure Type of Study:   Veins: Lower Extremities DVT Study, Venous Scan Lower Bilateral.  Indications for Study:Pulmonary Embolism. Comments:Right BKA. Findings:   Right Impression:                     Left Impression:  The common femoral, femoral,          The common femoral, femoral,  popliteal, proximal and mid saphenous popliteal, tibials and saphenous  veins are compressible with normal    veins are compressible with normal  doppler responses. doppler responses. Enlarged lymph nodes are noted at the Enlarged lymph nodes are noted at  right groin level. the left groin level. Patient Status: In Patient. Technical Quality:Good visualization. Velocities are measured in cm/s ; Diameters are measured in mm Right Lower Extremities DVT Study Measurements Right 2D Measurements +------------------------------------+----------+---------------+----------+ ! Location                            ! Visualized! Compressibility! Thrombosis! +------------------------------------+----------+---------------+----------+ ! Common Femoral                      !Yes       ! Yes            ! None      ! +------------------------------------+----------+---------------+----------+ ! Prox Femoral                        !Yes       ! Yes            ! None      ! +------------------------------------+----------+---------------+----------+ ! Mid Femoral                         !Yes       ! Yes            ! None      ! +------------------------------------+----------+---------------+----------+ ! Dist Femoral                        !Yes       ! Yes            ! None      ! +------------------------------------+----------+---------------+----------+ ! Popliteal                           !Yes       ! Yes            ! None      ! +------------------------------------+----------+---------------+----------+ ! Sapheno Femoral Junction            ! Yes       ! Yes            ! None +------------------------------------+----------+---------------+----------+ ! Mid Femoral                         !Yes       ! Yes            ! None      ! +------------------------------------+----------+---------------+----------+ ! Dist Femoral                        !Yes       ! Yes            ! None      ! +------------------------------------+----------+---------------+----------+ ! Deep Femoral                        !Yes       ! Yes            ! None      ! +------------------------------------+----------+---------------+----------+ ! Popliteal                           !Yes       ! Yes            ! None      ! +------------------------------------+----------+---------------+----------+ ! Sapheno Femoral Junction            ! Yes       ! Yes            ! None      ! +------------------------------------+----------+---------------+----------+ ! PTV                                 ! Yes       ! Yes            ! None      ! +------------------------------------+----------+---------------+----------+ ! Peroneal                            !Yes       ! Yes            ! None      ! +------------------------------------+----------+---------------+----------+ ! Gastroc                             ! Yes       ! Yes            ! None      ! +------------------------------------+----------+---------------+----------+ ! GSV Thigh                           ! Yes       ! Yes            ! None      ! +------------------------------------+----------+---------------+----------+ ! GSV Knee                            ! Yes       ! Yes            ! None      ! +------------------------------------+----------+---------------+----------+ ! GSV Ankle                           ! Yes       ! Yes            ! None      ! +------------------------------------+----------+---------------+----------+ ! SSV                                 ! Yes       ! Yes            ! None      ! +------------------------------------+----------+---------------+----------+ Left Doppler Measurements +---------------------------+------+------+--------------------------------+ ! Location                   ! Signal!Reflux! Reflux (msec)                   ! +---------------------------+------+------+--------------------------------+ ! Common Femoral             !Phasic!      !                                ! +---------------------------+------+------+--------------------------------+ ! Prox Femoral               !Phasic!      !                                ! +---------------------------+------+------+--------------------------------+ ! Popliteal                  !Phasic!      !                                ! +---------------------------+------+------+--------------------------------+  Conclusions   Summary   No evidence of superficial or deep venous thrombosis in both lower  extremities. Edema noted. Enlarged lymph nodes at the groins. Signature   ----------------------------------------------------------------  Electronically signed by Reba Kelley(Sonographer) on  12/21/2017 08:13 AM  ----------------------------------------------------------------   ----------------------------------------------------------------  Electronically signed by Macey Larry(Interpreting  physician) on 12/21/2017 09:47 PM  ----------------------------------------------------------------      Fl Modified Barium Swallow W Video    Result Date: 12/21/2017  EXAMINATION: MODIFIED BARIUM SWALLOW WAS PERFORMED IN CONJUNCTION WITH SPEECH PATHOLOGY SERVICES TECHNIQUE: Fluoroscopic evaluation of the swallowing mechanism was performed with multiple consistency of barium product.  FLUOROSCOPY DOSE AND TYPE OR TIME AND EXPOSURES: 2 minutes, 1 image COMPARISON: None HISTORY: ORDERING SYSTEM PROVIDED HISTORY: possible aspiration TECHNOLOGIST PROVIDED HISTORY: Reason for exam:->possible aspiration Ordering Physician Provided Reason for Exam: dysphagia Acuity: Unknown Type of Exam: Unknown FINDINGS: Premature vallecular spillage is seen throughout the examination. No evidence of laryngeal penetration or aspiration. No evidence of aspiration or penetration. Please see separate speech pathology report for full discussion of findings and recommendations. Consultations:    Consults:     Final Specialist Recommendations/Findings:   IP CONSULT TO HOSPITALIST      The patient was seen and examined on day of discharge and this discharge summary is in conjunction with any daily progress note from day of discharge. Discharge plan:     Disposition: Skilled Facility    Physician Follow Up: Erick Beltre, [address]  [phone]             Requiring Further Evaluation/Follow Up POST HOSPITALIZATION/Incidental Findings: Follow-up chest x-ray in 1-2 weeks    Diet: diabetic diet    Activity: As tolerated    Instructions to Patient: Take medications as prescribed    Discharge Medications:      Medication List      START taking these medications    fluconazole 100 MG tablet  Commonly known as:  DIFLUCAN  Take 1 tablet by mouth daily for 7 days  Start taking on:  12/28/2017     ipratropium-albuterol 0.5-2.5 (3) MG/3ML Soln nebulizer solution  Commonly known as:  DUONEB  Inhale 3 mLs into the lungs every 4 hours (while awake)     traMADol 50 MG tablet  Commonly known as:  ULTRAM  Take 1 tablet by mouth every 6 hours as needed for Pain .         CONTINUE taking these medications    albuterol sulfate  (90 Base) MCG/ACT inhaler  Commonly known as:  VENTOLIN HFA  INHALE 2 PUFFS INTO THE LUNGS EVERY 6 HOURS AS NEEDED FOR WHEEZING     amLODIPine 5 MG tablet  Commonly known as:  NORVASC  Take 1 tablet by mouth daily Hold if SBP <100     amoxicillin-clavulanate 875-125 MG per tablet  Commonly known as:  AUGMENTIN  Take 1 tablet by mouth every 12 hours for 7 days     apixaban 5 MG Tabs tablet  Commonly known as:  ELIQUIS  2 tablets twice a day for 7 days, then change to 1 tablet twice daily     aspirin EC 81 MG EC tablet  Take 1 tablet by mouth daily     atorvastatin 10 MG tablet  Commonly known as:  LIPITOR  TAKE 1 TABLET BY MOUTH DAILY     clopidogrel 75 MG tablet  Commonly known as:  PLAVIX  Take 1 tablet by mouth daily     esomeprazole 40 MG delayed release capsule  Commonly known as:  NEXIUM  TAKE ONE CAPSULE BY MOUTH TWICE DAILY     ferrous sulfate 325 (65 Fe) MG EC tablet  Take 1 tablet by mouth 2 times daily (with meals)     fluticasone 50 MCG/ACT nasal spray  Commonly known as:  FLONASE  1 spray by Nasal route daily as needed for Allergies (during allergy season)     gabapentin 100 MG capsule  Commonly known as:  NEURONTIN  Take 1 capsule by mouth 3 times daily     guaiFENesin 600 MG extended release tablet  Commonly known as:  MUCINEX  Take 2 tablets by mouth 2 times daily     insulin aspart 100 UNIT/ML injection pen  Commonly known as:  NOVOLOG FLEXPEN  Inject 5 Units into the skin 3 times daily (before meals)     insulin glargine 300 UNIT/ML injection pen  Commonly known as:  TOUJEO SOLOSTAR  Inject 75 Units into the skin nightly     Insulin Pen Needle 32G X 4 MM Misc  1 each by Does not apply route daily     lisinopril 10 MG tablet  Commonly known as:  PRINIVIL;ZESTRIL     metoprolol tartrate 25 MG tablet  Commonly known as:  LOPRESSOR  TAKE 1 TABLET BY MOUTH EVERY 12 HOURS     predniSONE 5 MG tablet  Commonly known as:  DELTASONE  4 tablets daily for 3 days, 3 tablets daily for 3 days, 2 tablets daily for 3 days, 1 tablet daily for 3 days     senna 8.6 MG tablet  Commonly known as:  SENOKOT  Take 2 tablets by mouth nightly as needed for Constipation     tamsulosin 0.4 MG capsule  Commonly known as:  FLOMAX  Take 1 capsule by mouth daily     traZODone 50 MG tablet  Commonly known as:  DESYREL  Take 1 tablet by mouth nightly     TRUEPLUS LANCETS 30G Misc  USE AS DIRECTED           Where to Get Your Medications      You can get these medications from any pharmacy    Bring a paper prescription for each of these

## 2017-12-27 NOTE — PROGRESS NOTES
Patient admitted to floor, database complete and VS done. Patient resting with call light within reach. Patient complains of cough and chronic back pain as well as pain when coughing. RN paged NP on call in request for cough medicine and pain medicine for breakthrough pain. RN will monitor.   Melita Brought

## 2017-12-27 NOTE — CARE COORDINATION
Social work:  Writer arranged for transport to [address] [facility] via [address] at 3:30 pm.  Informed Briana Bentons at [place] [facility] [place] time, agreeable to plan.     # for RN report is [phone]  Fax # for dc orders is [phone]

## 2017-12-27 NOTE — CARE COORDINATION
Attempt to meet with patient for discharge planning and care transitions. Patient currently not available. Will try again at later time. -NR    Patient is known to writer from previous admissions/care transitions. Kasi Menchaca recently discharged from [address] [place] [address] on 2017 after a 4 day stay for pneumonia and PE. He was discharged on 2017, home independent with planned start of oral ATB (Augmentin) and Eliquis (30 day free supply was to be provided through [facility]). He has a history of non-compliance with medication adherence,  Hx of above the knee amputation, now with prosthesis. Review of MR indicates he is active with pain management. Call to [facility], confirmed patient is active with that office for Dr. Barbara Carmona. Patient was discharged from [facility]. Care transitions will continue to follow with patient. [address] [facility],Fourth [facility] Transitions     2017    Patient: Zuleika Myers Patient : 1955   MRN: [id number]  Reason for Admission: There are no discharge diagnoses documented for the most recent discharge.    RARS: [facility] Risk Score: 25.5 CMRS: 14    Readmission Risk  Patient Active Problem List   Diagnosis    Type 2 diabetes mellitus with diabetic polyneuropathy, with long-term current use of insulin (HCC)    Neuropathic pain, leg    HLD (hyperlipidemia)    History of cancer    Diabetic polyneuropathy (HCC)    GERD (gastroesophageal reflux disease)    Allergic rhinitis    Tobacco abuse    COPD (chronic obstructive pulmonary disease) (HCC)    Edentulous    Dysphagia    Lung nodules    Esophageal cancer (HCC)    Fracture of humerus, left, closed    Closed fracture of humerus    DM type 2 with diabetic peripheral neuropathy (HCC)    Chronic obstructive pulmonary disease (HCC)    Hyperlipidemia    Gastroesophageal reflux disease    Chronic pain syndrome    Marijuana use    Closed displaced fracture of surgical neck of left humerus with routine healing    History of colon polyps    Infected open wound    Open wound of foot excluding toes    Cellulitis of right heel    Gangrene of lower extremity (HCC)    [facility]    Cellulitis of right lower extremity    Functional diarrhea    Sepsis due to methicillin resistant Staphylococcus aureus (MRSA) [facility]    Diabetic ketoacidosis without coma associated with type 2 diabetes mellitus ([facility]    Acute osteomyelitis of right calcaneus ([facility]    Benign essential HTN    History of esophageal cancer    Gangrene of foot ([facility])    Osteomyelitis, chronic, ankle or foot ([facility]    PAD (peripheral artery disease) [facility]    Other pulmonary embolism without acute cor pulmonale ([facility])    COPD exacerbation ([facility]    Pneumonia         Care Transitions Summary    Care Transitions Inpatient Review  Medication Review  Do you have all of your prescriptions and are they filled?:  Yes   Barriers to Medication Adherence:  Forgets to take  Are you able to afford your medications?:  Yes  How often do you have difficulty taking your medications?:  I seldom take them as prescribed. Housing Review  Who do you live with?:  Alone  Are you an active caregiver in your home?:  No  Does the person that you care for see a [facility] PCP?:  No  Social Support  Do you have a ?:  No  Do you have a [address] [place] [address]?:  No  Durable Medical Equipment  Patient DME:  Other, Walker, Straight cane  Other Patient DME:  Glucometer, L prosthesis for above the knee amputation. Functional Review  Ability to seek help/take action for Emergent/Urgent situations i.e. fire, crime, inclement weather or health crisis. :  Independent  Ability handle personal hygiene needs (bathing/dressing/grooming): Independent  Ability to manage medications: Independent  Ability to prepare food:  Independent  Ability to maintain home (clean home, laundry):   Independent  Ability to drive and/or has transportation: Independent  Ability to do shopping:  Independent  Ability to manage finances: Independent  Is patient able to live independently?:  Yes  Hearing and Vision  Visual Impairment:  Visual impairment (Glasses/contacts)  Hearing Impairment:  None  Care Transitions Interventions         Follow Up  No future appointments.     Health Maintenance  Health Maintenance Due   Topic Date Due    Diabetic microalbuminuria test  08/18/2017       Isiah Baer MA

## 2017-12-27 NOTE — PLAN OF CARE
Problem: Falls - Risk of  Goal: Absence of falls  Outcome: Ongoing      Problem: Gas Exchange - Impaired:  Goal: Levels of oxygenation will improve  Levels of oxygenation will improve  Outcome: Ongoing

## 2017-12-27 NOTE — CARE COORDINATION
Social work:  Writer advised pt has had 3 night IP stay 12/20-12/24 and may be ready for dc today. SW called Aldo Ness at [facility] and Yanely Kennedy at [facility] and advised them that pt is ready. Await call back from both facilities to see if they can accept.

## 2017-12-28 ENCOUNTER — TELEPHONE (OUTPATIENT)
Dept: PHARMACY | Age: 62
End: 2017-12-28

## 2017-12-28 NOTE — TELEPHONE ENCOUNTER
CLINICAL PHARMACY NOTE  Post-Discharge Transitions of Care (DONNELL)    Pt was discharged yesterday after readmission to [facility] and is now rescheduled to receive another telephone review of discharge meds for this most recent admission. See more recent telephone encounter for details.         [address], [address] [place] [address]  Medication Accuracy Service  Ph: [phone]      ______________________________________________________________________    CLINICAL PHARMACY NOTE   POST-DISCHARGE TELEPHONE FOLLOW-UP ADDENDUM    For Pharmacy Admin Tracking Only    Call within 2 business days of discharge?: Yes  [facility] Select Patient?: Yes  Total # of Interventions Recommended: 0   -   Total # Interventions Accepted: 0  Intervention Severity:   - Level 1 Intervention Present?: No   - Level 2 #: 0   - Level 3 #: 0  Outreach Status: Patient Unreachable  Care Coordinator Outreach to Patient?: No  Provider Contacted?: No  Time Spent (min): 5

## 2018-01-01 LAB
CULTURE: NORMAL
Lab: NORMAL
Lab: NORMAL
SPECIMEN DESCRIPTION: NORMAL
STATUS: NORMAL
STATUS: NORMAL

## 2018-01-02 ENCOUNTER — CARE COORDINATION (OUTPATIENT)
Dept: CASE MANAGEMENT | Age: 63
End: 2018-01-02

## 2018-01-04 ENCOUNTER — CARE COORDINATION (OUTPATIENT)
Dept: CASE MANAGEMENT | Age: 63
End: 2018-01-04

## 2018-01-09 ENCOUNTER — CARE COORDINATION (OUTPATIENT)
Dept: CASE MANAGEMENT | Age: 63
End: 2018-01-09

## 2018-01-16 ENCOUNTER — CARE COORDINATION (OUTPATIENT)
Dept: CASE MANAGEMENT | Age: 63
End: 2018-01-16

## 2018-01-16 NOTE — CARE COORDINATION
[facility] Transitions Initial Follow Up Call    Call within 2 business days of discharge: No    Patient: Jorge L Edwards Patient : 1955   MRN: [id number]  Reason for Admission: There are no discharge diagnoses documented for the most recent discharge. Discharge Date: 17 RARS: [facility] Risk Score: 25.5     Spoke with: [address]: Discharged from half-way ACUTE CARE [facility]    Non-face-to-face services provided:  Obtained and reviewed discharge summary and/or continuity of care documents    Care Transitions 24 Hour Call    Do you have any ongoing symptoms?:  Yes  Patient-reported symptoms:  Weakness  Interventions for patient-reported symptoms:  Notified Home Care, Other  Do you have a copy of your discharge instructions?:  Yes  Do you have all of your prescriptions and are they filled?:  Yes  Have you been contacted by a [address]?:  No  Have you scheduled your follow up appointment?:  No  Were you discharged with any Home Care or Post Acute Services:  Yes  Post Acute Services:  Home Health (Comment: [facility])  Patient DME:  Other, Walker, Straight cane  Other Patient DME:  Glucometer, L prosthesis for above the knee amputation. Do you have support at home?:  Alone  Are you an active caregiver in your home?:  No  Care Transitions Interventions       Answers all questions briefly and appears rushed, states he needs to lay down, requests me to call him later after he said he needs help with, \"everything\". No one to take him to any follow up appointments. Reports he gets his medications delivered and arranged in pill packs. States he takes his insulin, no difficulties. Does not monitor his blood sugars so is unable to tell me what those values are. Was unable to tell me what the name of the Home Health agency was. [address]. He has been admitted to [facility] as of Saturday, 18. Spoke with Debbi Alejo with [facility].

## 2018-01-18 ENCOUNTER — CARE COORDINATION (OUTPATIENT)
Dept: CASE MANAGEMENT | Age: 63
End: 2018-01-18

## 2018-01-18 NOTE — CARE COORDINATION
[facility] Transitions Follow Up Call    2018    Patient: Sarita Tai  Patient : 1955   MRN: [id number]  Reason for Admission: There are no discharge diagnoses documented for the most recent discharge. Discharge Date: 17 RARS: Risk Score: 25.5       Spoke with: Arthur Ruvalcaba received a long message from his sister talking about the reasons why she could not assist the patient with transportation. She requested a call back to answer questions about Chadd Cleary. I spoke with Chadd Cleary and asked him to talk to his sister as she had a lot of questions she about his care and doctors. He said that he was talking to her now. Reports that he needs help with transportation. I asked if the  from the [facility] had contacted him. He said, \"no\". Writer called and left a message for Kiah Frias, the  @ facility to see if the referral/process has been initiated. Contact information provided. Chadd Cleary reports that the nurse with [facility] has taken care of all the medication needs. States he is doing, \"good\". Blood sugars, \"good\". Chadd Cleary reports he is talking to his sister. Thanks writer for everything, ends phone call. Care Transitions Subsequent and Final Call    Subsequent and Final Calls  Do you have any ongoing symptoms?:  No  Have your medications changed?:  No  Do you have any questions related to your medications?:  No  Do you currently have any active services?:  Yes  Are you currently active with any services?:  Home Health  Do you have any needs or concerns that I can assist you with?:  Yes  Patient-reported Needs or Concerns:  Transportation needs. \"Court and doctor\"  appointments  Care Transitions Interventions  Other Interventions: Follow Up  No future appointments.     Octavio Carrion RN

## 2018-01-19 ENCOUNTER — HOSPITAL ENCOUNTER (INPATIENT)
Age: 63
LOS: 4 days | Discharge: NURSING, REHAB OR LONG-TERM CARE | DRG: 312 | End: 2018-01-23
Attending: EMERGENCY MEDICINE | Admitting: FAMILY MEDICINE
Payer: MEDICARE

## 2018-01-19 ENCOUNTER — APPOINTMENT (OUTPATIENT)
Dept: GENERAL RADIOLOGY | Age: 63
DRG: 312 | End: 2018-01-19
Payer: MEDICARE

## 2018-01-19 DIAGNOSIS — R55 SYNCOPE AND COLLAPSE: Primary | ICD-10-CM

## 2018-01-19 DIAGNOSIS — E11.42 TYPE 2 DIABETES MELLITUS WITH DIABETIC POLYNEUROPATHY, WITH LONG-TERM CURRENT USE OF INSULIN (HCC): ICD-10-CM

## 2018-01-19 DIAGNOSIS — I73.9 PAD (PERIPHERAL ARTERY DISEASE) (HCC): ICD-10-CM

## 2018-01-19 DIAGNOSIS — Z79.4 TYPE 2 DIABETES MELLITUS WITH DIABETIC POLYNEUROPATHY, WITH LONG-TERM CURRENT USE OF INSULIN (HCC): ICD-10-CM

## 2018-01-19 LAB
ABSOLUTE EOS #: 0.4 K/UL (ref 0–0.4)
ABSOLUTE IMMATURE GRANULOCYTE: ABNORMAL K/UL (ref 0–0.3)
ABSOLUTE LYMPH #: 1.8 K/UL (ref 1–4.8)
ABSOLUTE MONO #: 1.2 K/UL (ref 0.2–0.8)
ANION GAP SERPL CALCULATED.3IONS-SCNC: 11 MMOL/L (ref 9–17)
BASOPHILS # BLD: 1 % (ref 0–2)
BASOPHILS ABSOLUTE: 0.1 K/UL (ref 0–0.2)
BUN BLDV-MCNC: 29 MG/DL (ref 8–23)
BUN/CREAT BLD: 27 (ref 9–20)
CALCIUM SERPL-MCNC: 9 MG/DL (ref 8.6–10.4)
CHLORIDE BLD-SCNC: 113 MMOL/L (ref 98–107)
CO2: 23 MMOL/L (ref 20–31)
CREAT SERPL-MCNC: 1.09 MG/DL (ref 0.7–1.2)
DIFFERENTIAL TYPE: ABNORMAL
EOSINOPHILS RELATIVE PERCENT: 3 % (ref 1–4)
GFR AFRICAN AMERICAN: >60 ML/MIN
GFR NON-AFRICAN AMERICAN: >60 ML/MIN
GFR SERPL CREATININE-BSD FRML MDRD: ABNORMAL ML/MIN/{1.73_M2}
GFR SERPL CREATININE-BSD FRML MDRD: ABNORMAL ML/MIN/{1.73_M2}
GLUCOSE BLD-MCNC: 106 MG/DL (ref 75–110)
GLUCOSE BLD-MCNC: 183 MG/DL (ref 75–110)
GLUCOSE BLD-MCNC: 70 MG/DL (ref 70–99)
HCT VFR BLD CALC: 36.1 % (ref 41–53)
HEMOGLOBIN: 11.6 G/DL (ref 13.5–17.5)
IMMATURE GRANULOCYTES: ABNORMAL %
LACTIC ACID: 1.1 MMOL/L (ref 0.5–2.2)
LYMPHOCYTES # BLD: 13 % (ref 24–44)
MCH RBC QN AUTO: 28.9 PG (ref 26–34)
MCHC RBC AUTO-ENTMCNC: 32.1 G/DL (ref 31–37)
MCV RBC AUTO: 90.2 FL (ref 80–100)
MONOCYTES # BLD: 9 % (ref 1–7)
NRBC AUTOMATED: ABNORMAL PER 100 WBC
PDW BLD-RTO: 16.1 % (ref 11.5–14.5)
PLATELET # BLD: 447 K/UL (ref 130–400)
PLATELET ESTIMATE: ABNORMAL
PMV BLD AUTO: 7.7 FL (ref 6–12)
POTASSIUM SERPL-SCNC: 4.8 MMOL/L (ref 3.7–5.3)
RBC # BLD: 4.01 M/UL (ref 4.5–5.9)
RBC # BLD: ABNORMAL 10*6/UL
SEG NEUTROPHILS: 74 % (ref 36–66)
SEGMENTED NEUTROPHILS ABSOLUTE COUNT: 9.9 K/UL (ref 1.8–7.7)
SODIUM BLD-SCNC: 147 MMOL/L (ref 135–144)
WBC # BLD: 13.4 K/UL (ref 3.5–11)
WBC # BLD: ABNORMAL 10*3/UL

## 2018-01-19 PROCEDURE — 2580000003 HC RX 258: Performed by: EMERGENCY MEDICINE

## 2018-01-19 PROCEDURE — 96360 HYDRATION IV INFUSION INIT: CPT

## 2018-01-19 PROCEDURE — 82947 ASSAY GLUCOSE BLOOD QUANT: CPT

## 2018-01-19 PROCEDURE — 93880 EXTRACRANIAL BILAT STUDY: CPT

## 2018-01-19 PROCEDURE — 6370000000 HC RX 637 (ALT 250 FOR IP): Performed by: EMERGENCY MEDICINE

## 2018-01-19 PROCEDURE — 93005 ELECTROCARDIOGRAM TRACING: CPT

## 2018-01-19 PROCEDURE — 83605 ASSAY OF LACTIC ACID: CPT

## 2018-01-19 PROCEDURE — 99285 EMERGENCY DEPT VISIT HI MDM: CPT

## 2018-01-19 PROCEDURE — 2580000003 HC RX 258: Performed by: FAMILY MEDICINE

## 2018-01-19 PROCEDURE — 80048 BASIC METABOLIC PNL TOTAL CA: CPT

## 2018-01-19 PROCEDURE — 2060000000 HC ICU INTERMEDIATE R&B

## 2018-01-19 PROCEDURE — 71046 X-RAY EXAM CHEST 2 VIEWS: CPT

## 2018-01-19 PROCEDURE — 85025 COMPLETE CBC W/AUTO DIFF WBC: CPT

## 2018-01-19 PROCEDURE — 6370000000 HC RX 637 (ALT 250 FOR IP): Performed by: FAMILY MEDICINE

## 2018-01-19 RX ORDER — NICOTINE 21 MG/24HR
1 PATCH, TRANSDERMAL 24 HOURS TRANSDERMAL DAILY PRN
Status: DISCONTINUED | OUTPATIENT
Start: 2018-01-19 | End: 2018-01-23 | Stop reason: HOSPADM

## 2018-01-19 RX ORDER — ACETAMINOPHEN 325 MG/1
650 TABLET ORAL EVERY 4 HOURS PRN
Status: DISCONTINUED | OUTPATIENT
Start: 2018-01-19 | End: 2018-01-23 | Stop reason: HOSPADM

## 2018-01-19 RX ORDER — SODIUM CHLORIDE 9 MG/ML
INJECTION, SOLUTION INTRAVENOUS CONTINUOUS
Status: DISCONTINUED | OUTPATIENT
Start: 2018-01-19 | End: 2018-01-23 | Stop reason: HOSPADM

## 2018-01-19 RX ORDER — POTASSIUM CHLORIDE 20MEQ/15ML
40 LIQUID (ML) ORAL PRN
Status: DISCONTINUED | OUTPATIENT
Start: 2018-01-19 | End: 2018-01-23 | Stop reason: HOSPADM

## 2018-01-19 RX ORDER — HYDROCODONE BITARTRATE AND ACETAMINOPHEN 5; 325 MG/1; MG/1
1 TABLET ORAL EVERY 4 HOURS PRN
Status: DISCONTINUED | OUTPATIENT
Start: 2018-01-19 | End: 2018-01-23 | Stop reason: HOSPADM

## 2018-01-19 RX ORDER — OXYCODONE HYDROCHLORIDE 5 MG/1
5 TABLET ORAL ONCE
Status: COMPLETED | OUTPATIENT
Start: 2018-01-19 | End: 2018-01-19

## 2018-01-19 RX ORDER — 0.9 % SODIUM CHLORIDE 0.9 %
1000 INTRAVENOUS SOLUTION INTRAVENOUS ONCE
Status: COMPLETED | OUTPATIENT
Start: 2018-01-19 | End: 2018-01-19

## 2018-01-19 RX ORDER — ASPIRIN 81 MG/1
81 TABLET ORAL DAILY
Status: DISCONTINUED | OUTPATIENT
Start: 2018-01-19 | End: 2018-01-23 | Stop reason: HOSPADM

## 2018-01-19 RX ORDER — AMLODIPINE BESYLATE 5 MG/1
5 TABLET ORAL DAILY
Status: DISCONTINUED | OUTPATIENT
Start: 2018-01-19 | End: 2018-01-23 | Stop reason: HOSPADM

## 2018-01-19 RX ORDER — SODIUM CHLORIDE 0.9 % (FLUSH) 0.9 %
10 SYRINGE (ML) INJECTION PRN
Status: DISCONTINUED | OUTPATIENT
Start: 2018-01-19 | End: 2018-01-23 | Stop reason: HOSPADM

## 2018-01-19 RX ORDER — ONDANSETRON 2 MG/ML
4 INJECTION INTRAMUSCULAR; INTRAVENOUS EVERY 6 HOURS PRN
Status: DISCONTINUED | OUTPATIENT
Start: 2018-01-19 | End: 2018-01-23 | Stop reason: HOSPADM

## 2018-01-19 RX ORDER — ALBUTEROL SULFATE 90 UG/1
2 AEROSOL, METERED RESPIRATORY (INHALATION) EVERY 6 HOURS PRN
Status: DISCONTINUED | OUTPATIENT
Start: 2018-01-19 | End: 2018-01-23 | Stop reason: HOSPADM

## 2018-01-19 RX ORDER — BISACODYL 10 MG
10 SUPPOSITORY, RECTAL RECTAL DAILY PRN
Status: DISCONTINUED | OUTPATIENT
Start: 2018-01-19 | End: 2018-01-23 | Stop reason: HOSPADM

## 2018-01-19 RX ORDER — ATORVASTATIN CALCIUM 10 MG/1
10 TABLET, FILM COATED ORAL NIGHTLY
Status: DISCONTINUED | OUTPATIENT
Start: 2018-01-19 | End: 2018-01-21

## 2018-01-19 RX ORDER — CLOPIDOGREL BISULFATE 75 MG/1
75 TABLET ORAL DAILY
Status: DISCONTINUED | OUTPATIENT
Start: 2018-01-19 | End: 2018-01-23 | Stop reason: HOSPADM

## 2018-01-19 RX ORDER — MAGNESIUM SULFATE 1 G/100ML
1 INJECTION INTRAVENOUS PRN
Status: DISCONTINUED | OUTPATIENT
Start: 2018-01-19 | End: 2018-01-23 | Stop reason: HOSPADM

## 2018-01-19 RX ORDER — POTASSIUM CHLORIDE 20 MEQ/1
40 TABLET, EXTENDED RELEASE ORAL PRN
Status: DISCONTINUED | OUTPATIENT
Start: 2018-01-19 | End: 2018-01-23 | Stop reason: HOSPADM

## 2018-01-19 RX ORDER — POTASSIUM CHLORIDE 7.45 MG/ML
10 INJECTION INTRAVENOUS PRN
Status: DISCONTINUED | OUTPATIENT
Start: 2018-01-19 | End: 2018-01-23 | Stop reason: HOSPADM

## 2018-01-19 RX ORDER — SODIUM CHLORIDE 0.9 % (FLUSH) 0.9 %
10 SYRINGE (ML) INJECTION EVERY 12 HOURS SCHEDULED
Status: DISCONTINUED | OUTPATIENT
Start: 2018-01-19 | End: 2018-01-23 | Stop reason: HOSPADM

## 2018-01-19 RX ORDER — INSULIN GLARGINE 100 [IU]/ML
60 INJECTION, SOLUTION SUBCUTANEOUS EVERY MORNING
Status: DISCONTINUED | OUTPATIENT
Start: 2018-01-20 | End: 2018-01-21

## 2018-01-19 RX ORDER — PANTOPRAZOLE SODIUM 40 MG/1
40 TABLET, DELAYED RELEASE ORAL
Status: DISCONTINUED | OUTPATIENT
Start: 2018-01-20 | End: 2018-01-20

## 2018-01-19 RX ADMIN — SODIUM CHLORIDE 1000 ML: 9 INJECTION, SOLUTION INTRAVENOUS at 11:18

## 2018-01-19 RX ADMIN — SODIUM CHLORIDE: 9 INJECTION, SOLUTION INTRAVENOUS at 20:10

## 2018-01-19 RX ADMIN — OXYCODONE HYDROCHLORIDE 5 MG: 5 TABLET ORAL at 12:09

## 2018-01-19 RX ADMIN — APIXABAN 5 MG: 5 TABLET, FILM COATED ORAL at 20:13

## 2018-01-19 RX ADMIN — ATORVASTATIN CALCIUM 10 MG: 10 TABLET, FILM COATED ORAL at 20:13

## 2018-01-19 RX ADMIN — Medication 10 ML: at 20:10

## 2018-01-19 RX ADMIN — HYDROCODONE BITARTRATE AND ACETAMINOPHEN 1 TABLET: 5; 325 TABLET ORAL at 20:13

## 2018-01-19 ASSESSMENT — PAIN SCALES - GENERAL
PAINLEVEL_OUTOF10: 7
PAINLEVEL_OUTOF10: 4
PAINLEVEL_OUTOF10: 7
PAINLEVEL_OUTOF10: 5
PAINLEVEL_OUTOF10: 5

## 2018-01-19 ASSESSMENT — PAIN DESCRIPTION - ORIENTATION
ORIENTATION: LEFT;RIGHT
ORIENTATION: LEFT;RIGHT

## 2018-01-19 ASSESSMENT — PAIN DESCRIPTION - LOCATION
LOCATION: LEG
LOCATION: LEG

## 2018-01-19 ASSESSMENT — PAIN DESCRIPTION - PAIN TYPE
TYPE: CHRONIC PAIN
TYPE: CHRONIC PAIN

## 2018-01-19 ASSESSMENT — PAIN DESCRIPTION - PROGRESSION: CLINICAL_PROGRESSION: GRADUALLY IMPROVING

## 2018-01-19 NOTE — ED PROVIDER NOTES
No murmur heard. Pulmonary/Chest: Effort normal. No respiratory distress. +crackles in b/l lung bases   Abdominal: Soft. There is no tenderness. Musculoskeletal: Normal range of motion. He exhibits deformity. +right lower extremity BKA   Neurological: He is alert and oriented to person, place, and time. Skin: Skin is warm and dry. He is not diaphoretic. Psychiatric: He has a normal mood and affect. His behavior is normal. Judgment and thought content normal.   Nursing note and vitals reviewed. DIAGNOSTIC RESULTS     EKG: All EKG's are interpreted by the Emergency Department Physician who either signs or Co-signs this chart in the absence of a cardiologist.    Interpretation: Normal sinus rhythm with a rate of 74. Normal Axis. Normal intervals. No ST elevations. RADIOLOGY:   Non-plain film images such as CT, Ultrasound and MRI are read by the radiologist. Plain radiographic images are visualized and preliminarily interpreted by the emergency physician with the below findings:    Interpretation per the Radiologist below, if available at the time of this note:    XR CHEST STANDARD (2 VW)   Final Result   1. Pleural-based density were there was a pleural-based density on the recent   chest CT. Short-term (three-month) follow-up chest CT is recommended to   reassess. 2. Similar emphysematous changes. 3. Small bilateral pleural effusions. 4. No focal airspace consolidation. 5. Stable hiatal hernia.          ED BEDSIDE ULTRASOUND:   Performed by ED Physician - none    LABS:  Labs Reviewed   CBC WITH AUTO DIFFERENTIAL - Abnormal; Notable for the following:        Result Value    WBC 13.4 (*)     RBC 4.01 (*)     Hemoglobin 11.6 (*)     Hematocrit 36.1 (*)     RDW 16.1 (*)     Platelets 667 (*)     Seg Neutrophils 74 (*)     Lymphocytes 13 (*)     Monocytes 9 (*)     Segs Absolute 9.90 (*)     Absolute Mono # 1.20 (*)     All other components within normal limits   BASIC METABOLIC PANEL - Abnormal; Notable for the following:     BUN 29 (*)     Bun/Cre Ratio 27 (*)     Sodium 147 (*)     Chloride 113 (*)     All other components within normal limits   LACTIC ACID     All other labs were within normal range or not returned as of this dictation. EMERGENCY DEPARTMENT COURSE and DIFFERENTIAL DIAGNOSIS/MDM:   Vitals:    Vitals:    01/19/18 1208 01/19/18 1434 01/19/18 1437 01/19/18 1440   BP: (!) 108/58 115/62 (!) 110/54 (!) 113/51   Pulse: 76 79 82 84   Resp: 18      Temp:       TempSrc:       SpO2: 96%      Weight:       Height:         29-year-old male presenting after a syncopal episode in the setting of 3 days of dizziness as well as cough and shortness of breath. Chest x-ray reveals no persistent infiltrate compared to previous x-rays from his previous admission. His EKG is unremarkable. Labs reveal a mild leukocytosis of 13.4. He appears well overall. Given the syncopal episode, will admit for further observation and telemetry monitoring inhouse. CONSULTS:  IP CONSULT TO INTERNAL MEDICINE    PROCEDURES:  None indicated    FINAL IMPRESSION      1.  Syncope and collapse          DISPOSITION/PLAN   DISPOSITION Admitted 01/19/2018 02:45:15 PM    DISCHARGE MEDICATIONS:     Current Discharge Medication List        (Please note that portions of this note were completed with a voice recognition program.  Efforts were made to edit the dictations but occasionally words are mis-transcribed.)    Marcos Krabbe, MD  Attending Emergency Physician          Marcos Krabbe, MD  01/19/18 7667

## 2018-01-19 NOTE — ED NOTES
Pt called out requesting pain medication. Dr. Starr Jenkins notified.       Deangelo Kong, HA  01/19/18 1727

## 2018-01-19 NOTE — ED NOTES
Pt to room 14 via w/c with c/o ongoing cough/SOB x 1 month. Pt reports he was admitted here to [facility] [address] in December of 2017 for pneumonia, reports he was d/c'd to [facility]. but didn't stay the full course of tx there. Pt reports in past couple of weeks he has been having increased fatigue/weakness associated with dizziness, states \"Feels like I'm going to pass out\" and \"I actually passed out today while I was in court, that's why I'm here\". Pt states pleuritic CP \"All over in my chest\" that does not radiate anywhere, worse with C&DB. Pt reports he also had 3 episodes of N/V today prior to syncopal episode. Pt denies any abd pain, diarrhea, fevers, or urinary symptoms, but does c/o hot/cold chills. Pt is afebrile at this time. Respirations even and non labored. Skin PWD, MMM. NAD noted.       Suly Wilson RN  01/19/18 1982

## 2018-01-19 NOTE — H&P
History:        Procedure Laterality Date    COLONOSCOPY  05/11/2015    hyperplastic polyp    COLONOSCOPY  01/26/2017    ESOPHAGECTOMY      cancer    FOOT SURGERY Right 11/03/2016    I & D heel    FOOT SURGERY Right 12/31/2016    I & D    FRACTURE SURGERY Left 9/5/2015    humerus left, left leg    LEG AMPUTATION BELOW KNEE Right 01/21/2017    TOE AMPUTATION Right 2014    rt 3rd through 5th digits    TOE AMPUTATION Left 5/26/2016    left foot 5th toe    UPPER GASTROINTESTINAL ENDOSCOPY      5/14/13- with dilation    VASCULAR SURGERY Right 01/16/2017    foot guillotine amputation       Medications Prior to Admission:    Prior to Admission medications    Medication Sig Start Date End Date Taking?  Authorizing Provider   ipratropium-albuterol (DUONEB) 0.5-2.5 (3) MG/3ML SOLN nebulizer solution Inhale 3 mLs into the lungs every 4 hours (while awake) 12/27/17   Eric Rojas DO   predniSONE (DELTASONE) 5 MG tablet 4 tablets daily for 3 days, 3 tablets daily for 3 days, 2 tablets daily for 3 days, 1 tablet daily for 3 days 12/24/17   Panda Rojas DO   guaiFENesin (MUCINEX) 600 MG extended release tablet Take 2 tablets by mouth 2 times daily 12/24/17   Panda Rojas DO   apixaban (ELIQUIS) 5 MG TABS tablet 2 tablets twice a day for 7 days, then change to 1 tablet twice daily 12/24/17   Eric Rojas DO   esomeprazole (NEXIUM) 40 MG delayed release capsule TAKE ONE CAPSULE BY MOUTH TWICE DAILY 11/8/17   Jan Bauer MD   gabapentin (NEURONTIN) 100 MG capsule Take 1 capsule by mouth 3 times daily  Patient not taking: Reported on 12/21/2017 10/27/17   Jan Bauer MD   clopidogrel (PLAVIX) 75 MG tablet Take 1 tablet by mouth daily 10/27/17   Jan Bauer MD   aspirin EC 81 MG EC tablet Take 1 tablet by mouth daily 10/27/17   Jan Bauer MD   traZODone (DESYREL) 50 MG tablet Take 1 tablet by mouth nightly 7/17/17   Jan Bauer MD   fluticasone (FLONASE) 50 MCG/ACT nasal spray 1 spray by Nasal route daily as needed for Allergies (during allergy season) 6/22/17   Claire Zavala MD   lisinopril (PRINIVIL;ZESTRIL) 10 MG tablet Take 1 tablet by mouth daily 4/11/17   Dali Nuñez MD   amLODIPine (NORVASC) 5 MG tablet Take 1 tablet by mouth daily Hold if SBP <100 5/25/17   Patti Gates MD   ferrous sulfate 325 (65 FE) MG EC tablet Take 1 tablet by mouth 2 times daily (with meals) 5/25/17   Patti Gates MD   metoprolol tartrate (LOPRESSOR) 25 MG tablet TAKE 1 TABLET BY MOUTH EVERY 12 HOURS 5/25/17   Patti Gates MD   tamsulosin [facility]) 0.4 MG capsule Take 1 capsule by mouth daily 5/25/17   Patti Gates MD   atorvastatin (LIPITOR) 10 MG tablet TAKE 1 TABLET BY MOUTH DAILY 5/25/17   Patti Gates MD   insulin aspart (NOVOLOG FLEXPEN) 100 UNIT/ML injection pen Inject 5 Units into the skin 3 times daily (before meals) 5/25/17   Patti Gates MD   insulin glargine (TOUJEO SOLOSTAR) 300 UNIT/ML injection pen Inject 75 Units into the skin nightly 5/25/17   Patti Gates MD   albuterol sulfate HFA (VENTOLIN HFA) 108 (90 BASE) MCG/ACT inhaler INHALE 2 PUFFS INTO THE LUNGS EVERY 6 HOURS AS NEEDED FOR WHEEZING 5/25/17   Patti Gates MD   Insulin Pen Needle 32G X 4 MM MISC 1 each by Does not apply route daily 3/17/17   Dali Nuñez MD   senna (SENOKOT) 8.6 MG tablet Take 2 tablets by mouth nightly as needed for Constipation 11/7/16   Dali Nuñez MD   TRUEPLUS LANCETS 30G MISC USE AS DIRECTED 7/21/16   Dali Nuñez MD       Allergies:  Review of patient's allergies indicates no known allergies. Social History:   TOBACCO:   reports that he has been smoking Cigarettes. He has a 30.00 pack-year smoking history. He has never used smokeless tobacco.  ETOH:   reports that he does not drink alcohol.   OCCUPATION:      Family History:       Problem Relation Age of Onset    Diabetes Mother     Cancer Mother     Alcohol Abuse Father     Cancer Sister     Alcohol Abuse Maternal Aunt     Alcohol Abuse Maternal Uncle     Alcohol Abuse Paternal Aunt        REVIEW OF SYSTEMS:  Negative except for as above. Physical Exam:    Vitals: BP (!) 105/45   Pulse 73   Temp 97.5 °F (36.4 °C)   Resp 16   Ht 6' 1\" (1.854 m)   Wt 175 lb (79.4 kg)   SpO2 97%   BMI 23.09 kg/m²   General appearance: alert, appears stated age and cooperative  Skin: Skin color, texture, turgor normal. No rashes or lesions  HEENT: Head: Normocephalic, no lesions, without obvious abnormality. Neck: no adenopathy, no carotid bruit, no JVD, supple, symmetrical, trachea midline and thyroid not enlarged, symmetric, no tenderness/mass/nodules  Lungs: diminished breath sounds bibasilar and rales LLL, ANTONIA, RLL and RUL  Heart: regular rate and rhythm, S1, S2 normal, no S3 or S4 and no rub  Abdomen: soft, non-tender; bowel sounds normal; no masses,  no organomegaly  Extremities: Rt BKA, small stump wound, no cyanosis, erythema or edema  Neurologic: Mental status: Alert, oriented, thought content appropriate    CBC:   Recent Labs      01/19/18   1056   WBC  13.4*   HGB  11.6*   PLT  447*     BMP:    Recent Labs      01/19/18   1056   NA  147*   K  4.8   CL  113*   CO2  23   BUN  29*   CREATININE  1.09   GLUCOSE  70     Hepatic: No results for input(s): AST, ALT, ALB, BILITOT, ALKPHOS in the last 72 hours. Troponin: No results for input(s): TROPONINI in the last 72 hours. BNP: No results for input(s): BNP in the last 72 hours. Lipids: No results for input(s): CHOL, HDL in the last 72 hours. Invalid input(s): LDLCALCU  ABGs: No results found for: PHART, PO2ART, XCL1HST  INR: No results for input(s): INR in the last 72 hours.   -----------------------------------------------------------------  EKG 12 Lead [[id number]] Collected: 01/19/18 1045   Updated: 01/19/18 1233     Ventricular Rate 74 BPM    Atrial Rate 74 BPM    P-R Interval 132 ms    QRS Duration 80 ms    Q-T Interval 362 ms    QTc Calculation (Bazett) 401 ms    P Axis 57 degrees    R Axis 39 degrees    T Axis 48 degrees   Narrative:     Normal sinus rhythm  Normal ECG  When compared with ECG of 20-DEC-2017 20:06,  No significant change was found   XR CHEST STANDARD (2 VW) [[id number]] Collected: 01/19/18 1144   Updated: 01/19/18 1155    Specimen Type: Chest    Narrative:     EXAMINATION:  TWO VIEWS OF THE CHEST    1/19/2018 11:40 am    COMPARISON:  Chest x-ray from 12/27/2017.  Chest CT from 12/20/2017    HISTORY:  ORDERING SYSTEM PROVIDED HISTORY: cough  TECHNOLOGIST PROVIDED HISTORY:  Reason for exam:->cough  Ordering Physician Provided Reason for Exam: Pt. States check for pneumonia  with cough  Acuity: Unknown  Type of Exam: Unknown    FINDINGS:  Incompletely visualized orthopedic hardware of the proximal left humerus  identified.  There are small bilateral pleural effusions. Verlean Makenzie is similar  hyperexpansion of the lungs, flattening of the hemidiaphragms, relative  paucity of lung markings in the bilateral upper lobes and increased AP  dimension of the thorax.   Rounded retrocardiac opacity is compatible with  patient's known hiatal hernia.  There is no focal airspace consolidation or  pneumothorax.  The cardiomediastinal silhouette appears within normal limits,  given technique and there is no pulmonary vascular congestion.  Visualized  osseous structures appear moderately demineralized, but grossly intact, given  the non dedicated imaging. Impression:     1. Pleural-based density were there was a pleural-based density on the recent  chest CT.  Short-term (three-month) follow-up chest CT is recommended to  reassess. 2. Similar emphysematous changes. 3. Small bilateral pleural effusions. 4. No focal airspace consolidation. 5. Stable hiatal hernia.    Lactic Acid [[id number]] Collected: 01/19/18 1056   Updated: 01/19/18 1144    Specimen Source: Blood     Lactic Acid 1.1 mmol/L    Comment: Performed at [facility] polyneuropathy, with long-term current use of insulin ([facility]) E11.42, Z79.4    Neuropathic pain, leg G57.90    History of cancer Z85.9    Diabetic polyneuropathy ([facility]) E11.42    GERD (gastroesophageal reflux disease) K21.9    Allergic rhinitis J30.9    Tobacco abuse Z72.0    COPD (chronic obstructive pulmonary disease) ([facility]) J44.9    Edentulous K00.0    Dysphagia R13.10    Lung nodules R91.8    Esophageal cancer ([facility]) C15.9    Fracture of humerus, left, closed S42.302A    Closed fracture of humerus S42.309A    DM type 2 with diabetic peripheral neuropathy ([facility]) E11.42    Chronic obstructive pulmonary disease ([facility]) J44.9    Hyperlipidemia E78.5    Gastroesophageal reflux disease K21.9    Chronic pain syndrome G89.4    Marijuana use F12.90    History of colon polyps Z86.010    Cellulitis of right heel L03.115    PVD (peripheral vascular disease) ([facility]) I73.9    Functional diarrhea K59.1    Sepsis due to methicillin resistant Staphylococcus aureus (MRSA) ([facility]) A41.02    Benign essential HTN I10    History of esophageal cancer Z85.01    Osteomyelitis, chronic, ankle or foot ([facility] [address] M86.679    PAD (peripheral artery disease) ([facility]) I73.9    Other pulmonary embolism without acute cor pulmonale ([facility]) I26.99    Bacterial pneumonia J15.9    Fungal dermatitis B36.9    Syncope R55    Syncope and collapse R55       Electronically signed by Gail Willis MD on 1/19/2018 at 4:53 PM

## 2018-01-20 ENCOUNTER — APPOINTMENT (OUTPATIENT)
Dept: MRI IMAGING | Age: 63
DRG: 312 | End: 2018-01-20
Payer: MEDICARE

## 2018-01-20 LAB
ANION GAP SERPL CALCULATED.3IONS-SCNC: 10 MMOL/L (ref 9–17)
BUN BLDV-MCNC: 16 MG/DL (ref 8–23)
BUN/CREAT BLD: 20 (ref 9–20)
CALCIUM SERPL-MCNC: 8.4 MG/DL (ref 8.6–10.4)
CHLORIDE BLD-SCNC: 109 MMOL/L (ref 98–107)
CO2: 25 MMOL/L (ref 20–31)
CREAT SERPL-MCNC: 0.8 MG/DL (ref 0.7–1.2)
EKG ATRIAL RATE: 74 BPM
EKG P AXIS: 57 DEGREES
EKG P-R INTERVAL: 132 MS
EKG Q-T INTERVAL: 362 MS
EKG QRS DURATION: 80 MS
EKG QTC CALCULATION (BAZETT): 401 MS
EKG R AXIS: 39 DEGREES
EKG T AXIS: 48 DEGREES
EKG VENTRICULAR RATE: 74 BPM
GFR AFRICAN AMERICAN: >60 ML/MIN
GFR NON-AFRICAN AMERICAN: >60 ML/MIN
GFR SERPL CREATININE-BSD FRML MDRD: ABNORMAL ML/MIN/{1.73_M2}
GFR SERPL CREATININE-BSD FRML MDRD: ABNORMAL ML/MIN/{1.73_M2}
GLUCOSE BLD-MCNC: 119 MG/DL (ref 75–110)
GLUCOSE BLD-MCNC: 166 MG/DL (ref 75–110)
GLUCOSE BLD-MCNC: 168 MG/DL (ref 75–110)
GLUCOSE BLD-MCNC: 71 MG/DL (ref 70–99)
HCT VFR BLD CALC: 32.3 % (ref 41–53)
HEMOGLOBIN: 10.6 G/DL (ref 13.5–17.5)
LV EF: 55 %
LVEF MODALITY: NORMAL
MAGNESIUM: 2 MG/DL (ref 1.6–2.6)
MCH RBC QN AUTO: 29.5 PG (ref 26–34)
MCHC RBC AUTO-ENTMCNC: 32.6 G/DL (ref 31–37)
MCV RBC AUTO: 90.5 FL (ref 80–100)
NRBC AUTOMATED: ABNORMAL PER 100 WBC
PDW BLD-RTO: 15.8 % (ref 11.5–14.5)
PLATELET # BLD: 423 K/UL (ref 130–400)
PMV BLD AUTO: 6.9 FL (ref 6–12)
POTASSIUM SERPL-SCNC: 3.8 MMOL/L (ref 3.7–5.3)
RBC # BLD: 3.57 M/UL (ref 4.5–5.9)
SODIUM BLD-SCNC: 144 MMOL/L (ref 135–144)
WBC # BLD: 11 K/UL (ref 3.5–11)

## 2018-01-20 PROCEDURE — 85027 COMPLETE CBC AUTOMATED: CPT

## 2018-01-20 PROCEDURE — G8979 MOBILITY GOAL STATUS: HCPCS

## 2018-01-20 PROCEDURE — 97530 THERAPEUTIC ACTIVITIES: CPT

## 2018-01-20 PROCEDURE — 97165 OT EVAL LOW COMPLEX 30 MIN: CPT

## 2018-01-20 PROCEDURE — 82947 ASSAY GLUCOSE BLOOD QUANT: CPT

## 2018-01-20 PROCEDURE — 97110 THERAPEUTIC EXERCISES: CPT

## 2018-01-20 PROCEDURE — 80048 BASIC METABOLIC PNL TOTAL CA: CPT

## 2018-01-20 PROCEDURE — 6370000000 HC RX 637 (ALT 250 FOR IP): Performed by: FAMILY MEDICINE

## 2018-01-20 PROCEDURE — 2580000003 HC RX 258: Performed by: FAMILY MEDICINE

## 2018-01-20 PROCEDURE — 83735 ASSAY OF MAGNESIUM: CPT

## 2018-01-20 PROCEDURE — G8989 SELF CARE D/C STATUS: HCPCS

## 2018-01-20 PROCEDURE — 36415 COLL VENOUS BLD VENIPUNCTURE: CPT

## 2018-01-20 PROCEDURE — 2700000000 HC OXYGEN THERAPY PER DAY

## 2018-01-20 PROCEDURE — 97161 PT EVAL LOW COMPLEX 20 MIN: CPT

## 2018-01-20 PROCEDURE — G8987 SELF CARE CURRENT STATUS: HCPCS

## 2018-01-20 PROCEDURE — 97535 SELF CARE MNGMENT TRAINING: CPT

## 2018-01-20 PROCEDURE — 94640 AIRWAY INHALATION TREATMENT: CPT

## 2018-01-20 PROCEDURE — 94664 DEMO&/EVAL PT USE INHALER: CPT

## 2018-01-20 PROCEDURE — 6360000002 HC RX W HCPCS: Performed by: FAMILY MEDICINE

## 2018-01-20 PROCEDURE — G8988 SELF CARE GOAL STATUS: HCPCS

## 2018-01-20 PROCEDURE — G8978 MOBILITY CURRENT STATUS: HCPCS

## 2018-01-20 PROCEDURE — 93306 TTE W/DOPPLER COMPLETE: CPT

## 2018-01-20 PROCEDURE — 94760 N-INVAS EAR/PLS OXIMETRY 1: CPT

## 2018-01-20 PROCEDURE — 2060000000 HC ICU INTERMEDIATE R&B

## 2018-01-20 RX ORDER — DEXTROSE MONOHYDRATE 50 MG/ML
100 INJECTION, SOLUTION INTRAVENOUS PRN
Status: DISCONTINUED | OUTPATIENT
Start: 2018-01-20 | End: 2018-01-21 | Stop reason: SDUPTHER

## 2018-01-20 RX ORDER — NICOTINE POLACRILEX 4 MG
15 LOZENGE BUCCAL PRN
Status: DISCONTINUED | OUTPATIENT
Start: 2018-01-20 | End: 2018-01-21 | Stop reason: SDUPTHER

## 2018-01-20 RX ORDER — IPRATROPIUM BROMIDE AND ALBUTEROL SULFATE 2.5; .5 MG/3ML; MG/3ML
1 SOLUTION RESPIRATORY (INHALATION)
Status: DISCONTINUED | OUTPATIENT
Start: 2018-01-20 | End: 2018-01-22

## 2018-01-20 RX ORDER — GABAPENTIN 100 MG/1
100 CAPSULE ORAL 3 TIMES DAILY
Status: DISCONTINUED | OUTPATIENT
Start: 2018-01-20 | End: 2018-01-21

## 2018-01-20 RX ORDER — LANOLIN ALCOHOL/MO/W.PET/CERES
325 CREAM (GRAM) TOPICAL 2 TIMES DAILY WITH MEALS
Status: DISCONTINUED | OUTPATIENT
Start: 2018-01-20 | End: 2018-01-23 | Stop reason: HOSPADM

## 2018-01-20 RX ORDER — LEVOFLOXACIN 5 MG/ML
500 INJECTION, SOLUTION INTRAVENOUS EVERY 24 HOURS
Status: DISCONTINUED | OUTPATIENT
Start: 2018-01-20 | End: 2018-01-23 | Stop reason: HOSPADM

## 2018-01-20 RX ORDER — DEXTROSE MONOHYDRATE 25 G/50ML
12.5 INJECTION, SOLUTION INTRAVENOUS PRN
Status: DISCONTINUED | OUTPATIENT
Start: 2018-01-20 | End: 2018-01-21 | Stop reason: SDUPTHER

## 2018-01-20 RX ORDER — PANTOPRAZOLE SODIUM 40 MG/1
40 TABLET, DELAYED RELEASE ORAL
Status: DISCONTINUED | OUTPATIENT
Start: 2018-01-20 | End: 2018-01-23 | Stop reason: HOSPADM

## 2018-01-20 RX ADMIN — Medication 2 PUFF: at 01:33

## 2018-01-20 RX ADMIN — HYDROCODONE BITARTRATE AND ACETAMINOPHEN 1 TABLET: 5; 325 TABLET ORAL at 00:33

## 2018-01-20 RX ADMIN — HYDROCODONE BITARTRATE AND ACETAMINOPHEN 1 TABLET: 5; 325 TABLET ORAL at 10:07

## 2018-01-20 RX ADMIN — HYDROCODONE BITARTRATE AND ACETAMINOPHEN 1 TABLET: 5; 325 TABLET ORAL at 04:35

## 2018-01-20 RX ADMIN — CLOPIDOGREL BISULFATE 75 MG: 75 TABLET ORAL at 09:38

## 2018-01-20 RX ADMIN — HYDROCODONE BITARTRATE AND ACETAMINOPHEN 1 TABLET: 5; 325 TABLET ORAL at 19:08

## 2018-01-20 RX ADMIN — SODIUM CHLORIDE: 9 INJECTION, SOLUTION INTRAVENOUS at 10:07

## 2018-01-20 RX ADMIN — LEVOFLOXACIN 500 MG: 5 INJECTION, SOLUTION INTRAVENOUS at 18:56

## 2018-01-20 RX ADMIN — IPRATROPIUM BROMIDE AND ALBUTEROL SULFATE 1 AMPULE: .5; 3 SOLUTION RESPIRATORY (INHALATION) at 16:26

## 2018-01-20 RX ADMIN — ATORVASTATIN CALCIUM 10 MG: 10 TABLET, FILM COATED ORAL at 21:35

## 2018-01-20 RX ADMIN — AMLODIPINE BESYLATE 5 MG: 5 TABLET ORAL at 09:38

## 2018-01-20 RX ADMIN — INSULIN GLARGINE 60 UNITS: 100 INJECTION, SOLUTION SUBCUTANEOUS at 09:41

## 2018-01-20 RX ADMIN — APIXABAN 5 MG: 5 TABLET, FILM COATED ORAL at 09:38

## 2018-01-20 RX ADMIN — Medication 10 ML: at 09:39

## 2018-01-20 RX ADMIN — PANTOPRAZOLE SODIUM 40 MG: 40 TABLET, DELAYED RELEASE ORAL at 18:52

## 2018-01-20 RX ADMIN — PANTOPRAZOLE SODIUM 40 MG: 40 TABLET, DELAYED RELEASE ORAL at 06:12

## 2018-01-20 RX ADMIN — ASPIRIN 81 MG: 81 TABLET, COATED ORAL at 09:39

## 2018-01-20 RX ADMIN — HYDROCODONE BITARTRATE AND ACETAMINOPHEN 1 TABLET: 5; 325 TABLET ORAL at 13:58

## 2018-01-20 RX ADMIN — MAGNESIUM HYDROXIDE 30 ML: 400 SUSPENSION ORAL at 18:56

## 2018-01-20 RX ADMIN — APIXABAN 5 MG: 5 TABLET, FILM COATED ORAL at 21:35

## 2018-01-20 RX ADMIN — METOPROLOL TARTRATE 25 MG: 25 TABLET ORAL at 21:35

## 2018-01-20 ASSESSMENT — PAIN SCALES - GENERAL
PAINLEVEL_OUTOF10: 6
PAINLEVEL_OUTOF10: 7
PAINLEVEL_OUTOF10: 4
PAINLEVEL_OUTOF10: 2
PAINLEVEL_OUTOF10: 6
PAINLEVEL_OUTOF10: 6
PAINLEVEL_OUTOF10: 7

## 2018-01-20 ASSESSMENT — PAIN DESCRIPTION - LOCATION
LOCATION: LEG

## 2018-01-20 ASSESSMENT — PAIN DESCRIPTION - PAIN TYPE
TYPE: CHRONIC PAIN

## 2018-01-20 ASSESSMENT — ENCOUNTER SYMPTOMS
DIARRHEA: 0
COUGH: 1
SHORTNESS OF BREATH: 0

## 2018-01-20 ASSESSMENT — PAIN DESCRIPTION - ORIENTATION
ORIENTATION: RIGHT;LEFT
ORIENTATION: RIGHT;LEFT

## 2018-01-20 NOTE — PROGRESS NOTES
Signs  Patient Currently in Pain: Yes       Orientation  Orientation  Overall Orientation Status: Within Functional Limits    Social/Functional History  Social/Functional History  Lives With: Alone  Type of Home: House  Home Layout: One level  Home Access: Stairs to enter without rails  Entrance Stairs - Number of Steps: 2  Bathroom Shower/Tub: Tub/Shower unit  Bathroom Equipment: Shower chair  Home Equipment: Rolling walker  ADL Assistance: Independent  Homemaking Assistance: Independent  Homemaking Responsibilities: Yes  Ambulation Assistance: Independent  Transfer Assistance: Independent  Active : No  Additional Comments: Pt has prosthesis for R LE, has had for a year. Normally indep in home, was in rehab past few weeks then home 2-3 days  Objective          AROM RLE (degrees)  RLE AROM: WFL  RLE General AROM: R BKA  AROM LLE (degrees)  LLE AROM : WFL  AROM RUE (degrees)  RUE AROM : WFL  AROM LUE (degrees)  LUE AROM : WFL  Strength RLE  Strength RLE: WFL  Comment: BKA  Strength LLE  Strength LLE: WFL  Strength RUE  Strength RUE: WFL  Strength LUE  Strength LUE: WFL     Sensation  Overall Sensation Status: WFL  Bed mobility  Supine to Sit: Stand by assistance  Sit to Supine: Stand by assistance  Transfers  Sit to Stand:  (Pt refused)  Ambulation  Ambulation?: No (Pt refused)  Stairs/Curb  Stairs?: No     Balance  Posture: Good  Sitting - Static: Good  Sitting - Dynamic: Good  Exercises  Comments: AROM B LE in supine x 10 reps. Pt educated on self stretching and continuing AROM in bed. Educated pt on benefits of rw to decrease stress and weight with ulcers on R stump, pt unwilling to consider due to a bad episode with rw in past.  Assessment   Body structures, Functions, Activity limitations: Decreased functional mobility ; Decreased strength;Decreased endurance;Decreased balance  Assessment: Pt reports he is able to get up to bathroom with prosthesis indep, not willing at time of assessment.   Limited

## 2018-01-20 NOTE — PLAN OF CARE
Problem: Pain:  Goal: Pain level will decrease  Pain level will decrease   Outcome: Ongoing  Patient pain levels are being assessed using 0-10 pain scale. Patient is given pain medication as ordered (see e-Mar). Patient is encouraged to reposition self to provide pain relief. Room is kept darkened to decrease external stimulation.      Problem: Serum Glucose Level - Abnormal:  Goal: Ability to maintain appropriate glucose levels will improve  Ability to maintain appropriate glucose levels will improve   Outcome: Ongoing

## 2018-01-20 NOTE — PROGRESS NOTES
Myriam Salinas MD        Or    potassium chloride 20 MEQ/15ML (10%) oral solution 40 mEq  40 mEq Oral PRN Laya Espana MD        Or    potassium chloride 10 mEq/100 mL IVPB (Peripheral Line)  10 mEq Intravenous PRN Laya Espana MD        magnesium sulfate 1 g in dextrose 5% 100 mL IVPB  1 g Intravenous PRN Laya Espana MD        acetaminophen (TYLENOL) tablet 650 mg  650 mg Oral Q4H PRN Laya Espana MD        HYDROcodone-acetaminophen (NORCO) 5-325 MG per tablet 1 tablet  1 tablet Oral Q4H PRN Laya Espana MD   1 tablet at 01/20/18 1358    magnesium hydroxide (MILK OF MAGNESIA) 400 MG/5ML suspension 30 mL  30 mL Oral Daily PRN Laya Espana MD        bisacodyl (DULCOLAX) suppository 10 mg  10 mg Rectal Daily PRN Laya Espana MD        ondansetron (ZOFRAN) injection 4 mg  4 mg Intravenous Q6H PRN Laya Espana MD        nicotine (NICODERM CQ) 21 MG/24HR 1 patch  1 patch Transdermal Daily PRN Laya Espana MD         No Known Allergies  Principal Problem:    Syncope  Active Problems:    DM type 2 with diabetic peripheral neuropathy (HCC)    Benign essential HTN    Syncope and collapse    Blood pressure (!) 155/77, pulse 89, temperature 98.2 °F (36.8 °C), temperature source Oral, resp. rate 20, height 6' 1\" (1.854 m), weight 173 lb 8 oz (78.7 kg), SpO2 95 %. Subjective:  Symptoms:  Improved. He reports cough, weakness and anxiety. No shortness of breath, malaise, chest pain, headache, chest pressure, anorexia or diarrhea. Diet:  Poor intake. Activity level: Impaired due to weakness. Pain:  He complains of pain that is mild. He reports pain is improving. Pain is well controlled. Objective:  General Appearance:  Comfortable, ill-appearing, in no acute distress and not in pain. Vital signs: (most recent): Blood pressure (!) 155/77, pulse 89, temperature 98.2 °F (36.8 °C), temperature source Oral, resp. rate 20, height 6' 1\" (1.854 m), weight 173 lb 8 oz (78.7 kg), SpO2 95 %. collapse    ). Plan:   (No more dizzy spells  COPD  ABx, aerosols prn  DM  accu checks  ISS  HTN  Restart antihypertensives slowly  Chronic pain syndrome/ OA  Norco prn  Syncope work up in progress  MRI pending  Sz suspected also  EEG pending  DC plan once OK w Neuro).        Odalys Hoyt MD  1/20/2018
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
## 2018-01-21 PROBLEM — I65.23 CAROTID STENOSIS, ASYMPTOMATIC, BILATERAL: Chronic | Status: ACTIVE | Noted: 2018-01-21

## 2018-01-21 LAB
ANION GAP SERPL CALCULATED.3IONS-SCNC: 11 MMOL/L (ref 9–17)
BUN BLDV-MCNC: 13 MG/DL (ref 8–23)
BUN/CREAT BLD: 19 (ref 9–20)
CALCIUM SERPL-MCNC: 8.5 MG/DL (ref 8.6–10.4)
CHLORIDE BLD-SCNC: 105 MMOL/L (ref 98–107)
CO2: 26 MMOL/L (ref 20–31)
CREAT SERPL-MCNC: 0.67 MG/DL (ref 0.7–1.2)
GFR AFRICAN AMERICAN: >60 ML/MIN
GFR NON-AFRICAN AMERICAN: >60 ML/MIN
GFR SERPL CREATININE-BSD FRML MDRD: ABNORMAL ML/MIN/{1.73_M2}
GFR SERPL CREATININE-BSD FRML MDRD: ABNORMAL ML/MIN/{1.73_M2}
GLUCOSE BLD-MCNC: 127 MG/DL (ref 75–110)
GLUCOSE BLD-MCNC: 169 MG/DL (ref 75–110)
GLUCOSE BLD-MCNC: 178 MG/DL (ref 75–110)
GLUCOSE BLD-MCNC: 219 MG/DL (ref 75–110)
GLUCOSE BLD-MCNC: 295 MG/DL (ref 75–110)
GLUCOSE BLD-MCNC: 62 MG/DL (ref 75–110)
GLUCOSE BLD-MCNC: 65 MG/DL (ref 70–99)
HCT VFR BLD CALC: 33.4 % (ref 41–53)
HEMOGLOBIN: 10.8 G/DL (ref 13.5–17.5)
MCH RBC QN AUTO: 29.3 PG (ref 26–34)
MCHC RBC AUTO-ENTMCNC: 32.4 G/DL (ref 31–37)
MCV RBC AUTO: 90.4 FL (ref 80–100)
NRBC AUTOMATED: ABNORMAL PER 100 WBC
PDW BLD-RTO: 15.7 % (ref 11.5–14.5)
PLATELET # BLD: 409 K/UL (ref 130–400)
PMV BLD AUTO: 7.3 FL (ref 6–12)
POTASSIUM SERPL-SCNC: 3.6 MMOL/L (ref 3.7–5.3)
RBC # BLD: 3.7 M/UL (ref 4.5–5.9)
SODIUM BLD-SCNC: 142 MMOL/L (ref 135–144)
WBC # BLD: 11.9 K/UL (ref 3.5–11)

## 2018-01-21 PROCEDURE — 6370000000 HC RX 637 (ALT 250 FOR IP): Performed by: FAMILY MEDICINE

## 2018-01-21 PROCEDURE — 94760 N-INVAS EAR/PLS OXIMETRY 1: CPT

## 2018-01-21 PROCEDURE — 85027 COMPLETE CBC AUTOMATED: CPT

## 2018-01-21 PROCEDURE — 6370000000 HC RX 637 (ALT 250 FOR IP): Performed by: SURGERY

## 2018-01-21 PROCEDURE — 80048 BASIC METABOLIC PNL TOTAL CA: CPT

## 2018-01-21 PROCEDURE — 95816 EEG AWAKE AND DROWSY: CPT

## 2018-01-21 PROCEDURE — 2580000003 HC RX 258: Performed by: FAMILY MEDICINE

## 2018-01-21 PROCEDURE — 6360000002 HC RX W HCPCS: Performed by: FAMILY MEDICINE

## 2018-01-21 PROCEDURE — 82947 ASSAY GLUCOSE BLOOD QUANT: CPT

## 2018-01-21 PROCEDURE — 36415 COLL VENOUS BLD VENIPUNCTURE: CPT

## 2018-01-21 PROCEDURE — 2060000000 HC ICU INTERMEDIATE R&B

## 2018-01-21 PROCEDURE — 94640 AIRWAY INHALATION TREATMENT: CPT

## 2018-01-21 RX ORDER — TAMSULOSIN HYDROCHLORIDE 0.4 MG/1
0.4 CAPSULE ORAL DAILY
Status: DISCONTINUED | OUTPATIENT
Start: 2018-01-21 | End: 2018-01-23 | Stop reason: HOSPADM

## 2018-01-21 RX ORDER — DEXTROSE MONOHYDRATE 50 MG/ML
100 INJECTION, SOLUTION INTRAVENOUS PRN
Status: DISCONTINUED | OUTPATIENT
Start: 2018-01-21 | End: 2018-01-23 | Stop reason: HOSPADM

## 2018-01-21 RX ORDER — DEXTROSE MONOHYDRATE 25 G/50ML
12.5 INJECTION, SOLUTION INTRAVENOUS PRN
Status: DISCONTINUED | OUTPATIENT
Start: 2018-01-21 | End: 2018-01-23 | Stop reason: HOSPADM

## 2018-01-21 RX ORDER — ATORVASTATIN CALCIUM 80 MG/1
80 TABLET, FILM COATED ORAL NIGHTLY
Status: DISCONTINUED | OUTPATIENT
Start: 2018-01-21 | End: 2018-01-23 | Stop reason: HOSPADM

## 2018-01-21 RX ORDER — LISINOPRIL 10 MG/1
10 TABLET ORAL DAILY
Status: DISCONTINUED | OUTPATIENT
Start: 2018-01-21 | End: 2018-01-23 | Stop reason: HOSPADM

## 2018-01-21 RX ORDER — NICOTINE POLACRILEX 4 MG
15 LOZENGE BUCCAL PRN
Status: DISCONTINUED | OUTPATIENT
Start: 2018-01-21 | End: 2018-01-23 | Stop reason: HOSPADM

## 2018-01-21 RX ORDER — INSULIN GLARGINE 100 [IU]/ML
40 INJECTION, SOLUTION SUBCUTANEOUS EVERY MORNING
Status: DISCONTINUED | OUTPATIENT
Start: 2018-01-21 | End: 2018-01-22

## 2018-01-21 RX ADMIN — IPRATROPIUM BROMIDE AND ALBUTEROL SULFATE 1 AMPULE: .5; 3 SOLUTION RESPIRATORY (INHALATION) at 15:47

## 2018-01-21 RX ADMIN — IPRATROPIUM BROMIDE AND ALBUTEROL SULFATE 1 AMPULE: .5; 3 SOLUTION RESPIRATORY (INHALATION) at 11:33

## 2018-01-21 RX ADMIN — PANTOPRAZOLE SODIUM 40 MG: 40 TABLET, DELAYED RELEASE ORAL at 18:02

## 2018-01-21 RX ADMIN — CLOPIDOGREL BISULFATE 75 MG: 75 TABLET ORAL at 10:50

## 2018-01-21 RX ADMIN — SODIUM CHLORIDE: 9 INJECTION, SOLUTION INTRAVENOUS at 00:53

## 2018-01-21 RX ADMIN — MAGNESIUM HYDROXIDE 30 ML: 400 SUSPENSION ORAL at 19:32

## 2018-01-21 RX ADMIN — APIXABAN 5 MG: 5 TABLET, FILM COATED ORAL at 10:50

## 2018-01-21 RX ADMIN — INSULIN GLARGINE 40 UNITS: 100 INJECTION, SOLUTION SUBCUTANEOUS at 10:52

## 2018-01-21 RX ADMIN — FERROUS SULFATE TAB EC 325 MG (65 MG FE EQUIVALENT) 325 MG: 325 (65 FE) TABLET DELAYED RESPONSE at 10:51

## 2018-01-21 RX ADMIN — HYDROCODONE BITARTRATE AND ACETAMINOPHEN 1 TABLET: 5; 325 TABLET ORAL at 06:09

## 2018-01-21 RX ADMIN — ATORVASTATIN CALCIUM 80 MG: 10 TABLET, FILM COATED ORAL at 20:41

## 2018-01-21 RX ADMIN — INSULIN LISPRO 3 UNITS: 100 INJECTION, SOLUTION INTRAVENOUS; SUBCUTANEOUS at 23:03

## 2018-01-21 RX ADMIN — Medication 2 PUFF: at 04:16

## 2018-01-21 RX ADMIN — LEVOFLOXACIN 500 MG: 5 INJECTION, SOLUTION INTRAVENOUS at 18:02

## 2018-01-21 RX ADMIN — APIXABAN 5 MG: 5 TABLET, FILM COATED ORAL at 20:41

## 2018-01-21 RX ADMIN — PANTOPRAZOLE SODIUM 40 MG: 40 TABLET, DELAYED RELEASE ORAL at 06:09

## 2018-01-21 RX ADMIN — ASPIRIN 81 MG: 81 TABLET, COATED ORAL at 10:51

## 2018-01-21 RX ADMIN — HYDROCODONE BITARTRATE AND ACETAMINOPHEN 1 TABLET: 5; 325 TABLET ORAL at 10:50

## 2018-01-21 RX ADMIN — IPRATROPIUM BROMIDE AND ALBUTEROL SULFATE 1 AMPULE: .5; 3 SOLUTION RESPIRATORY (INHALATION) at 08:25

## 2018-01-21 RX ADMIN — HYDROCODONE BITARTRATE AND ACETAMINOPHEN 1 TABLET: 5; 325 TABLET ORAL at 00:03

## 2018-01-21 RX ADMIN — AMLODIPINE BESYLATE 5 MG: 5 TABLET ORAL at 10:50

## 2018-01-21 RX ADMIN — METOPROLOL TARTRATE 25 MG: 25 TABLET ORAL at 20:41

## 2018-01-21 RX ADMIN — HYDROCODONE BITARTRATE AND ACETAMINOPHEN 1 TABLET: 5; 325 TABLET ORAL at 22:13

## 2018-01-21 RX ADMIN — LISINOPRIL 10 MG: 10 TABLET ORAL at 13:12

## 2018-01-21 RX ADMIN — HYDROCODONE BITARTRATE AND ACETAMINOPHEN 1 TABLET: 5; 325 TABLET ORAL at 18:02

## 2018-01-21 RX ADMIN — METOPROLOL TARTRATE 25 MG: 25 TABLET ORAL at 10:50

## 2018-01-21 RX ADMIN — TAMSULOSIN HYDROCHLORIDE 0.4 MG: 0.4 CAPSULE ORAL at 13:12

## 2018-01-21 ASSESSMENT — PAIN DESCRIPTION - LOCATION: LOCATION: LEG

## 2018-01-21 ASSESSMENT — PAIN SCALES - GENERAL
PAINLEVEL_OUTOF10: 7
PAINLEVEL_OUTOF10: 7
PAINLEVEL_OUTOF10: 5
PAINLEVEL_OUTOF10: 7
PAINLEVEL_OUTOF10: 7
PAINLEVEL_OUTOF10: 4
PAINLEVEL_OUTOF10: 0
PAINLEVEL_OUTOF10: 7

## 2018-01-21 ASSESSMENT — PAIN DESCRIPTION - DESCRIPTORS: DESCRIPTORS: ACHING;DISCOMFORT

## 2018-01-21 ASSESSMENT — PAIN DESCRIPTION - FREQUENCY: FREQUENCY: INTERMITTENT

## 2018-01-21 ASSESSMENT — PAIN DESCRIPTION - PROGRESSION: CLINICAL_PROGRESSION: GRADUALLY WORSENING

## 2018-01-21 ASSESSMENT — PAIN DESCRIPTION - ONSET: ONSET: ON-GOING

## 2018-01-21 ASSESSMENT — PAIN DESCRIPTION - ORIENTATION: ORIENTATION: RIGHT;LEFT

## 2018-01-21 ASSESSMENT — PAIN DESCRIPTION - PAIN TYPE: TYPE: CHRONIC PAIN

## 2018-01-21 NOTE — PROGRESS NOTES
Additional eGFR calculator    available at:         [email or web address]         Performed at [address] [place], [address] [facility]    [phone]        GFR Staging NOT REPORTED   CBC [[id number]] (Abnormal) Collected: 01/21/18 0648   Updated: 01/21/18 0710     WBC 11.9 (H) k/uL    RBC 3.70 (L) m/uL    Hemoglobin 10.8 (L) g/dL    Hematocrit 33.4 (L) %    MCV 90.4 fL    MCH 29.3 pg    MCHC 32.4 g/dL    RDW 15.7 (H) %    Platelets 226 (H) k/uL    MPV 7.3 fL    Comment: Performed at [address] [facility] [address] [facility]    [phone]        NRBC Automated NOT REPORTED per 100 WBC   POC Glucose Fingerstick [[id number]] (Abnormal) Collected: 01/20/18 2040   Updated: 01/20/18 2150     POC Glucose 168 (H) mg/dL   POC Glucose Fingerstick [[id number]] (Abnormal) Collected: 01/20/18 1704   Updated: 01/20/18 1710     POC Glucose 119 (H) mg/dL       Assessment:    Condition: In serious condition. Improving.    (Patient Active Problem List:     Type 2 diabetes mellitus with diabetic polyneuropathy, with long-term current use of insulin (HCC)     Neuropathic pain, leg     History of cancer     Diabetic polyneuropathy (HCC)     GERD (gastroesophageal reflux disease)     Allergic rhinitis     Tobacco abuse     COPD (chronic obstructive pulmonary disease) (HCC)     Edentulous     Dysphagia     Lung nodules     Esophageal cancer (HCC)     Fracture of humerus, left, closed     Closed fracture of humerus     DM type 2 with diabetic peripheral neuropathy (HCC)     Chronic obstructive pulmonary disease (HCC)     Hyperlipidemia     Gastroesophageal reflux disease     Chronic pain syndrome     Marijuana use     History of colon polyps     Cellulitis of right heel     PVD (peripheral vascular disease) (HCC)     Functional diarrhea     Sepsis due to methicillin resistant Staphylococcus aureus (MRSA) (HCC)     Benign essential HTN     History of esophageal cancer     Osteomyelitis, chronic, ankle or foot (HCC)     PAD (peripheral artery disease) ([facility]     Other pulmonary embolism without acute cor pulmonale (HCC)     Bacterial pneumonia     Fungal dermatitis     Syncope     Syncope and collapse     Carotid stenosis, asymptomatic, bilateral    ). Plan:   (Pt had hypoglycemia  Lantus reduced  BG checked q hr x3  ISS  HTN - cont meds  Counseled on sx  Has carotid artery disease  Further work up and mgmt per Vascular  Syncope work up in progress).        Shelley Rutledge MD  1/21/2018

## 2018-01-21 NOTE — PROGRESS NOTES
Eloisa Farris is a 63 y.o. male patient.     Current Facility-Administered Medications   Medication Dose Route Frequency Provider Last Rate Last Dose    atorvastatin (LIPITOR) tablet 80 mg  80 mg Oral Nightly Son Goins MD        glucose (GLUTOSE) 40 % oral gel 15 g  15 g Oral PRN Laya Espana MD        dextrose 50 % solution 12.5 g  12.5 g Intravenous PRN Laya Espana MD        glucagon (rDNA) injection 1 mg  1 mg Intramuscular PRN Laya Espana MD        dextrose 5 % solution  100 mL/hr Intravenous PRN Laya Espana MD        levofloxacin (LEVAQUIN) 500 MG/100ML infusion 500 mg  500 mg Intravenous Q24H Laya Espana MD   Stopped at 01/20/18 2000    ipratropium-albuterol (DUONEB) nebulizer solution 1 ampule  1 ampule Inhalation Q4H While awake Myriam Salinas MD   1 ampule at 01/21/18 0825    pantoprazole (PROTONIX) tablet 40 mg  40 mg Oral BID AC Laya Espana MD   40 mg at 01/21/18 0609    ferrous sulfate EC tablet 325 mg  325 mg Oral BID WC Laya Espana MD        gabapentin (NEURONTIN) capsule 100 mg  100 mg Oral TID Laya Espana MD        metoprolol tartrate (LOPRESSOR) tablet 25 mg  25 mg Oral BID Myriam Salinas MD   25 mg at 01/20/18 2135    albuterol sulfate  (90 Base) MCG/ACT inhaler 2 puff  2 puff Inhalation Q6H PRN Laya Espana MD   2 puff at 01/21/18 0416    amLODIPine (NORVASC) tablet 5 mg  5 mg Oral Daily Laya Espana MD   5 mg at 01/20/18 0938    apixaban (ELIQUIS) tablet 5 mg  5 mg Oral BID Laya Espana MD   5 mg at 01/20/18 2135    aspirin EC tablet 81 mg  81 mg Oral Daily Laya Espana MD   81 mg at 01/20/18 0939    clopidogrel (PLAVIX) tablet 75 mg  75 mg Oral Daily Laya Espana MD   75 mg at 01/20/18 0938    insulin glargine (LANTUS) injection vial 60 Units  60 Units Subcutaneous QAM Myriam Salinas MD   60 Units at 01/20/18 0941    0.9 % sodium chloride infusion   Intravenous Continuous Laya Espana MD 50 mL/hr at 01/21/18 0058      weight 173 lb 9.6 oz (78.7 kg), SpO2 97 %. Vital signs are normal.    Lungs:  Normal effort. Heart: Normal rate. Neurological: Patient is alert and oriented to person, place and time. Assessment:  (Syncope. No further spells. Workup in progress. Awaiting studies. ).        Francisco J Young MD  1/21/2018

## 2018-01-21 NOTE — PROGRESS NOTES
Patient had episode of emesis shortly after med pass. Emesis was a moderate amount expelled into a trash can. Patient reports he had not been feeling well, but now he feels better.

## 2018-01-21 NOTE — PLAN OF CARE
Problem: Pain:  Goal: Pain level will decrease  Pain level will decrease   Outcome: Ongoing  Pain assessments completed, pain medications administered as ordered (see MAR), will continue to monitor. Problem: Falls - Risk of:  Goal: Will remain free from falls  Will remain free from falls   Outcome: Met This Shift  Pt remains free from falls/injury this shift, bed locked and in lowest position, call light and bedside table within reach, bed alarm activated, falling star posted outside of room, non-skid socks on, hourly rounding, will continue to monitor. Problem: Risk for Impaired Skin Integrity  Goal: Tissue integrity - skin and mucous membranes  Structural intactness and normal physiological function of skin and  mucous membranes.    Outcome: Ongoing  Skin assessment completed, mepilex to RLE is clean/dry/intact, pt turns per self I&Os monitored, skin and linens kept clean/dry, pillow support provided, will continue to monitor

## 2018-01-21 NOTE — CONSULTS
Please see dictated note for history. Please see dictated note for physical exam.  63year old male recovering from pneumonia suffered a syncopal event, probably vaso-vagal as he is recovering from pneumonia. Please see orders for details of plans. Thank you for the consult. Full note has been dictated. We will follow with you.
(NORVASC) 5 MG tablet Take 1 tablet by mouth daily Hold if SBP <100 5/25/17   Patti Eng MD   ferrous sulfate 325 (65 FE) MG EC tablet Take 1 tablet by mouth 2 times daily (with meals) 5/25/17   Patti Eng MD   metoprolol tartrate (LOPRESSOR) 25 MG tablet TAKE 1 TABLET BY MOUTH EVERY 12 HOURS 5/25/17   Patti Eng MD   tamsulosin [facility]) 0.4 MG capsule Take 1 capsule by mouth daily 5/25/17   Patti Eng MD   atorvastatin (LIPITOR) 10 MG tablet TAKE 1 TABLET BY MOUTH DAILY 5/25/17   Patti Eng MD   insulin aspart (NOVOLOG FLEXPEN) 100 UNIT/ML injection pen Inject 5 Units into the skin 3 times daily (before meals) 5/25/17   Patti Eng MD   insulin glargine (TOUJEO SOLOSTAR) 300 UNIT/ML injection pen Inject 75 Units into the skin nightly 5/25/17   Patti Eng MD   albuterol sulfate HFA (VENTOLIN HFA) 108 (90 BASE) MCG/ACT inhaler INHALE 2 PUFFS INTO THE LUNGS EVERY 6 HOURS AS NEEDED FOR WHEEZING 5/25/17   Patti Eng MD   Insulin Pen Needle 32G X 4 MM MISC 1 each by Does not apply route daily 3/17/17   Whit Broussard MD   senna (SENOKOT) 8.6 MG tablet Take 2 tablets by mouth nightly as needed for Constipation 11/7/16   Whit Broussard MD   TRUEPLUS LANCETS 30G MISC USE AS DIRECTED 7/21/16   Whit Broussard MD        Allergies:       Review of patient's allergies indicates no known allergies. Social History:     Tobacco:    reports that he has been smoking Cigarettes. He has a 30.00 pack-year smoking history. He has never used smokeless tobacco.  Alcohol:      reports that he does not drink alcohol. Drug Use:  reports that he does not use drugs.     Family History:     Family History   Problem Relation Age of Onset    Diabetes Mother     Cancer Mother     Alcohol Abuse Father     Cancer Sister     Alcohol Abuse Maternal Aunt     Alcohol Abuse Maternal Uncle     Alcohol Abuse Paternal Aunt        Review of Systems:     Positive and

## 2018-01-21 NOTE — PROGRESS NOTES
Patient has 100 mg gabapentin scheduled 3x daily. Patient refused medication, stated it makes him sick to take it. Patient stated he has taken Lyrica in the past and would prefer to take that instead.

## 2018-01-22 ENCOUNTER — APPOINTMENT (OUTPATIENT)
Dept: MRI IMAGING | Age: 63
DRG: 312 | End: 2018-01-22
Payer: MEDICARE

## 2018-01-22 LAB
ANION GAP SERPL CALCULATED.3IONS-SCNC: 10 MMOL/L (ref 9–17)
BUN BLDV-MCNC: 15 MG/DL (ref 8–23)
BUN/CREAT BLD: 19 (ref 9–20)
CALCIUM SERPL-MCNC: 8.5 MG/DL (ref 8.6–10.4)
CHLORIDE BLD-SCNC: 104 MMOL/L (ref 98–107)
CO2: 27 MMOL/L (ref 20–31)
CREAT SERPL-MCNC: 0.81 MG/DL (ref 0.7–1.2)
EKG ATRIAL RATE: 71 BPM
EKG P AXIS: 76 DEGREES
EKG P-R INTERVAL: 128 MS
EKG Q-T INTERVAL: 370 MS
EKG QRS DURATION: 92 MS
EKG QTC CALCULATION (BAZETT): 402 MS
EKG R AXIS: 48 DEGREES
EKG T AXIS: 11 DEGREES
EKG VENTRICULAR RATE: 71 BPM
GFR AFRICAN AMERICAN: >60 ML/MIN
GFR NON-AFRICAN AMERICAN: >60 ML/MIN
GFR SERPL CREATININE-BSD FRML MDRD: ABNORMAL ML/MIN/{1.73_M2}
GFR SERPL CREATININE-BSD FRML MDRD: ABNORMAL ML/MIN/{1.73_M2}
GLUCOSE BLD-MCNC: 115 MG/DL (ref 75–110)
GLUCOSE BLD-MCNC: 140 MG/DL (ref 70–99)
GLUCOSE BLD-MCNC: 141 MG/DL (ref 75–110)
GLUCOSE BLD-MCNC: 221 MG/DL (ref 75–110)
GLUCOSE BLD-MCNC: 245 MG/DL (ref 75–110)
HCT VFR BLD CALC: 33.7 % (ref 41–53)
HEMOGLOBIN: 10.8 G/DL (ref 13.5–17.5)
MCH RBC QN AUTO: 29.1 PG (ref 26–34)
MCHC RBC AUTO-ENTMCNC: 32.1 G/DL (ref 31–37)
MCV RBC AUTO: 90.6 FL (ref 80–100)
NRBC AUTOMATED: ABNORMAL PER 100 WBC
PDW BLD-RTO: 16.4 % (ref 11.5–14.5)
PLATELET # BLD: 442 K/UL (ref 130–400)
PMV BLD AUTO: 7.3 FL (ref 6–12)
POTASSIUM SERPL-SCNC: 4.1 MMOL/L (ref 3.7–5.3)
RBC # BLD: 3.72 M/UL (ref 4.5–5.9)
SODIUM BLD-SCNC: 141 MMOL/L (ref 135–144)
WBC # BLD: 9.7 K/UL (ref 3.5–11)

## 2018-01-22 PROCEDURE — 6370000000 HC RX 637 (ALT 250 FOR IP): Performed by: SURGERY

## 2018-01-22 PROCEDURE — 36415 COLL VENOUS BLD VENIPUNCTURE: CPT

## 2018-01-22 PROCEDURE — A9579 GAD-BASE MR CONTRAST NOS,1ML: HCPCS | Performed by: PSYCHIATRY & NEUROLOGY

## 2018-01-22 PROCEDURE — 82947 ASSAY GLUCOSE BLOOD QUANT: CPT

## 2018-01-22 PROCEDURE — 94640 AIRWAY INHALATION TREATMENT: CPT

## 2018-01-22 PROCEDURE — 2580000003 HC RX 258: Performed by: PSYCHIATRY & NEUROLOGY

## 2018-01-22 PROCEDURE — 94760 N-INVAS EAR/PLS OXIMETRY 1: CPT

## 2018-01-22 PROCEDURE — 6360000004 HC RX CONTRAST MEDICATION: Performed by: PSYCHIATRY & NEUROLOGY

## 2018-01-22 PROCEDURE — 97110 THERAPEUTIC EXERCISES: CPT

## 2018-01-22 PROCEDURE — 2580000003 HC RX 258: Performed by: FAMILY MEDICINE

## 2018-01-22 PROCEDURE — 93923 UPR/LXTR ART STDY 3+ LVLS: CPT

## 2018-01-22 PROCEDURE — 85027 COMPLETE CBC AUTOMATED: CPT

## 2018-01-22 PROCEDURE — 6360000002 HC RX W HCPCS: Performed by: FAMILY MEDICINE

## 2018-01-22 PROCEDURE — 80048 BASIC METABOLIC PNL TOTAL CA: CPT

## 2018-01-22 PROCEDURE — 97530 THERAPEUTIC ACTIVITIES: CPT

## 2018-01-22 PROCEDURE — 6370000000 HC RX 637 (ALT 250 FOR IP): Performed by: FAMILY MEDICINE

## 2018-01-22 PROCEDURE — 70553 MRI BRAIN STEM W/O & W/DYE: CPT

## 2018-01-22 PROCEDURE — 2060000000 HC ICU INTERMEDIATE R&B

## 2018-01-22 RX ORDER — LANOLIN ALCOHOL/MO/W.PET/CERES
325 CREAM (GRAM) TOPICAL 2 TIMES DAILY
COMMUNITY
End: 2018-08-15 | Stop reason: ALTCHOICE

## 2018-01-22 RX ORDER — IPRATROPIUM BROMIDE AND ALBUTEROL SULFATE 2.5; .5 MG/3ML; MG/3ML
1 SOLUTION RESPIRATORY (INHALATION)
Status: DISCONTINUED | OUTPATIENT
Start: 2018-01-22 | End: 2018-01-23 | Stop reason: HOSPADM

## 2018-01-22 RX ORDER — POLYETHYLENE GLYCOL 3350 17 G/17G
17 POWDER, FOR SOLUTION ORAL DAILY PRN
Status: DISCONTINUED | OUTPATIENT
Start: 2018-01-22 | End: 2018-01-23 | Stop reason: HOSPADM

## 2018-01-22 RX ORDER — LORAZEPAM 2 MG/ML
1 INJECTION INTRAMUSCULAR ONCE
Status: COMPLETED | OUTPATIENT
Start: 2018-01-22 | End: 2018-01-22

## 2018-01-22 RX ORDER — SENNA PLUS 8.6 MG/1
1 TABLET ORAL 2 TIMES DAILY PRN
Status: DISCONTINUED | OUTPATIENT
Start: 2018-01-22 | End: 2018-01-23 | Stop reason: HOSPADM

## 2018-01-22 RX ORDER — SODIUM CHLORIDE 0.9 % (FLUSH) 0.9 %
10 SYRINGE (ML) INJECTION
Status: COMPLETED | OUTPATIENT
Start: 2018-01-22 | End: 2018-01-22

## 2018-01-22 RX ORDER — INSULIN GLARGINE 100 [IU]/ML
50 INJECTION, SOLUTION SUBCUTANEOUS EVERY MORNING
Status: DISCONTINUED | OUTPATIENT
Start: 2018-01-23 | End: 2018-01-23 | Stop reason: HOSPADM

## 2018-01-22 RX ORDER — TRAZODONE HYDROCHLORIDE 100 MG/1
100 TABLET ORAL NIGHTLY PRN
Status: ON HOLD | COMMUNITY
End: 2018-01-23

## 2018-01-22 RX ORDER — PREGABALIN 75 MG/1
75 CAPSULE ORAL 2 TIMES DAILY
COMMUNITY
End: 2018-03-19 | Stop reason: SDUPTHER

## 2018-01-22 RX ADMIN — INSULIN GLARGINE 40 UNITS: 100 INJECTION, SOLUTION SUBCUTANEOUS at 09:07

## 2018-01-22 RX ADMIN — LORAZEPAM 1 MG: 2 INJECTION INTRAMUSCULAR; INTRAVENOUS at 11:41

## 2018-01-22 RX ADMIN — HYDROCODONE BITARTRATE AND ACETAMINOPHEN 1 TABLET: 5; 325 TABLET ORAL at 22:39

## 2018-01-22 RX ADMIN — PANTOPRAZOLE SODIUM 40 MG: 40 TABLET, DELAYED RELEASE ORAL at 06:02

## 2018-01-22 RX ADMIN — HYDROCODONE BITARTRATE AND ACETAMINOPHEN 1 TABLET: 5; 325 TABLET ORAL at 10:33

## 2018-01-22 RX ADMIN — TAMSULOSIN HYDROCHLORIDE 0.4 MG: 0.4 CAPSULE ORAL at 09:07

## 2018-01-22 RX ADMIN — GADOTERIDOL 17 ML: 279.3 INJECTION, SOLUTION INTRAVENOUS at 12:53

## 2018-01-22 RX ADMIN — HYDROCODONE BITARTRATE AND ACETAMINOPHEN 1 TABLET: 5; 325 TABLET ORAL at 06:05

## 2018-01-22 RX ADMIN — ASPIRIN 81 MG: 81 TABLET, COATED ORAL at 09:07

## 2018-01-22 RX ADMIN — IPRATROPIUM BROMIDE AND ALBUTEROL SULFATE 1 AMPULE: .5; 3 SOLUTION RESPIRATORY (INHALATION) at 08:07

## 2018-01-22 RX ADMIN — INSULIN LISPRO 2 UNITS: 100 INJECTION, SOLUTION INTRAVENOUS; SUBCUTANEOUS at 13:10

## 2018-01-22 RX ADMIN — METOPROLOL TARTRATE 25 MG: 25 TABLET ORAL at 21:06

## 2018-01-22 RX ADMIN — LEVOFLOXACIN 500 MG: 5 INJECTION, SOLUTION INTRAVENOUS at 17:09

## 2018-01-22 RX ADMIN — ATORVASTATIN CALCIUM 80 MG: 10 TABLET, FILM COATED ORAL at 21:06

## 2018-01-22 RX ADMIN — CLOPIDOGREL BISULFATE 75 MG: 75 TABLET ORAL at 09:07

## 2018-01-22 RX ADMIN — AMLODIPINE BESYLATE 5 MG: 5 TABLET ORAL at 09:07

## 2018-01-22 RX ADMIN — INSULIN LISPRO 2 UNITS: 100 INJECTION, SOLUTION INTRAVENOUS; SUBCUTANEOUS at 21:06

## 2018-01-22 RX ADMIN — SENNOSIDES 8.6 MG: 8.6 TABLET, FILM COATED ORAL at 10:33

## 2018-01-22 RX ADMIN — IPRATROPIUM BROMIDE AND ALBUTEROL SULFATE 1 AMPULE: .5; 3 SOLUTION RESPIRATORY (INHALATION) at 14:57

## 2018-01-22 RX ADMIN — HYDROCODONE BITARTRATE AND ACETAMINOPHEN 1 TABLET: 5; 325 TABLET ORAL at 17:18

## 2018-01-22 RX ADMIN — IPRATROPIUM BROMIDE AND ALBUTEROL SULFATE 1 AMPULE: .5; 3 SOLUTION RESPIRATORY (INHALATION) at 19:02

## 2018-01-22 RX ADMIN — APIXABAN 5 MG: 5 TABLET, FILM COATED ORAL at 21:06

## 2018-01-22 RX ADMIN — LISINOPRIL 10 MG: 10 TABLET ORAL at 09:07

## 2018-01-22 RX ADMIN — METOPROLOL TARTRATE 25 MG: 25 TABLET ORAL at 09:07

## 2018-01-22 RX ADMIN — Medication 10 ML: at 12:53

## 2018-01-22 RX ADMIN — POLYETHYLENE GLYCOL 3350 17 G: 17 POWDER, FOR SOLUTION ORAL at 10:33

## 2018-01-22 RX ADMIN — APIXABAN 5 MG: 5 TABLET, FILM COATED ORAL at 09:07

## 2018-01-22 RX ADMIN — Medication 10 ML: at 09:07

## 2018-01-22 RX ADMIN — PANTOPRAZOLE SODIUM 40 MG: 40 TABLET, DELAYED RELEASE ORAL at 17:09

## 2018-01-22 RX ADMIN — IPRATROPIUM BROMIDE AND ALBUTEROL SULFATE 1 AMPULE: .5; 3 SOLUTION RESPIRATORY (INHALATION) at 00:29

## 2018-01-22 RX ADMIN — INSULIN LISPRO 4 UNITS: 100 INJECTION, SOLUTION INTRAVENOUS; SUBCUTANEOUS at 17:09

## 2018-01-22 ASSESSMENT — PAIN DESCRIPTION - ONSET: ONSET: ON-GOING

## 2018-01-22 ASSESSMENT — PAIN SCALES - GENERAL
PAINLEVEL_OUTOF10: 4
PAINLEVEL_OUTOF10: 5
PAINLEVEL_OUTOF10: 4
PAINLEVEL_OUTOF10: 7
PAINLEVEL_OUTOF10: 7
PAINLEVEL_OUTOF10: 4
PAINLEVEL_OUTOF10: 6
PAINLEVEL_OUTOF10: 7

## 2018-01-22 ASSESSMENT — PAIN DESCRIPTION - FREQUENCY: FREQUENCY: INTERMITTENT

## 2018-01-22 ASSESSMENT — PAIN DESCRIPTION - ORIENTATION: ORIENTATION: LEFT;RIGHT

## 2018-01-22 ASSESSMENT — PAIN DESCRIPTION - LOCATION
LOCATION: LEG
LOCATION: LEG

## 2018-01-22 ASSESSMENT — ENCOUNTER SYMPTOMS
DIARRHEA: 0
COUGH: 1
SHORTNESS OF BREATH: 0

## 2018-01-22 ASSESSMENT — PAIN DESCRIPTION - PROGRESSION: CLINICAL_PROGRESSION: NOT CHANGED

## 2018-01-22 ASSESSMENT — PAIN DESCRIPTION - DESCRIPTORS: DESCRIPTORS: ACHING;SORE

## 2018-01-22 ASSESSMENT — PAIN DESCRIPTION - PAIN TYPE: TYPE: CHRONIC PAIN

## 2018-01-22 NOTE — PROGRESS NOTES
Physical Therapy  DATE: 2018    NAME: Gaby Ortiz  MRN: [id number]   : 1955  Discharge Recommendation:   Subacute/Skilled Nursing Facility       18 1118   Restrictions/Precautions   Restrictions/Precautions Isolation   Lower Extremity Weight Bearing Restrictions   Right Lower Extremity Weight Bearing Weight Bearing As Tolerated   Partial Weight Bearing Percentage Or Pounds per pt, asked to limit WB for ulcers healing   General   Chart Reviewed Yes   Pain Screening   Patient Currently in Pain Yes   Pain Assessment   Pain Assessment 0-10   Pain Level 4   Pain Location Leg   Pain Orientation Left;Right   Ambulation   Ambulation? No   Exercises   Comments supine LE AROM x 20 reps, UE medium resistance tband ex x 20 reps    Short term goals   Short term goal 1 Pt indep with transfers   Short term goal 2 Pt amb 25 ft with appropriate device and supervision   Short term goal 3 Pt negotiate 2 stairs with 1 rail and CGA   Conditions Requiring Skilled Therapeutic Intervention   Body structures, Functions, Activity limitations Decreased functional mobility ; Decreased strength;Decreased endurance;Decreased balance   Assessment Pt reports he is able to get up to bathroom with prosthesis indep, not willing at time of assessment. Limited assist at home, may benefit fro return to SNF for endurance, home health if discharging home   Discharge Recommendations Subacute/Skilled Nursing Facility   Activity Tolerance   Activity Tolerance Patient limited by fatigue   Plan   Times per week 5-6x/week   Current Treatment Recommendations Strengthening;Balance Training;Functional Mobility Training;Gait Training;Stair training; Endurance Training;Transfer Training   Aaron Thompson PTA     Time in: 1118 Time out: 1135

## 2018-01-22 NOTE — PROGRESS NOTES
Anh Lenz is a 63 y.o. male patient.     Current Facility-Administered Medications   Medication Dose Route Frequency Provider Last Rate Last Dose    ipratropium-albuterol (DUONEB) nebulizer solution 1 ampule  1 ampule Inhalation Q4H WA Laya Espana MD   1 ampule at 01/22/18 1457    senna (SENOKOT) tablet 8.6 mg  1 tablet Oral BID PRN Laya Espana MD   8.6 mg at 01/22/18 1033    polyethylene glycol (GLYCOLAX) packet 17 g  17 g Oral Daily PRN Gail Willis MD   17 g at 01/22/18 1033    [START ON 1/23/2018] insulin glargine (LANTUS) injection vial 50 Units  50 Units Subcutaneous QAM Laya Espana MD        atorvastatin (LIPITOR) tablet 80 mg  80 mg Oral Nightly Jodie Hood MD   80 mg at 01/21/18 2041    lisinopril (PRINIVIL;ZESTRIL) tablet 10 mg  10 mg Oral Daily Laya Espana MD   10 mg at 01/22/18 0907    tamsulosin (FLOMAX) capsule 0.4 mg  0.4 mg Oral Daily Laya Espana MD   0.4 mg at 01/22/18 0907    insulin lispro (HUMALOG) injection vial 0-12 Units  0-12 Units Subcutaneous TID WC Laya Espana MD   4 Units at 01/22/18 1709    insulin lispro (HUMALOG) injection vial 0-6 Units  0-6 Units Subcutaneous Nightly Gail Willis MD   3 Units at 01/21/18 2303    glucose (GLUTOSE) 40 % oral gel 15 g  15 g Oral PRN Laya Espana MD        dextrose 50 % solution 12.5 g  12.5 g Intravenous PRN Laya Espana MD        glucagon (rDNA) injection 1 mg  1 mg Intramuscular PRN Laya Espana MD        dextrose 5 % solution  100 mL/hr Intravenous PRN Laya Espana MD        levofloxacin (LEVAQUIN) 500 MG/100ML infusion 500 mg  500 mg Intravenous Q24H Laya Espana  mL/hr at 01/22/18 1709 500 mg at 01/22/18 1709    pantoprazole (PROTONIX) tablet 40 mg  40 mg Oral BID AC Laya Espana MD   40 mg at 01/22/18 1709    ferrous sulfate EC tablet 325 mg  325 mg Oral BID WC Laya Espana MD   325 mg at 01/21/18 1051    metoprolol tartrate (LOPRESSOR) tablet 25 mg  25 mg Oral BID Laya MD Malorie   25 mg at 01/22/18 0907    albuterol sulfate  (90 Base) MCG/ACT inhaler 2 puff  2 puff Inhalation Q6H PRN Laya Espana MD   2 puff at 01/21/18 0416    amLODIPine (NORVASC) tablet 5 mg  5 mg Oral Daily Laya Espana MD   5 mg at 01/22/18 0907    apixaban (ELIQUIS) tablet 5 mg  5 mg Oral BID Laya Espana MD   5 mg at 01/22/18 0907    aspirin EC tablet 81 mg  81 mg Oral Daily Laya Espana MD   81 mg at 01/22/18 0907    clopidogrel (PLAVIX) tablet 75 mg  75 mg Oral Daily Laya Espana MD   75 mg at 01/22/18 0907    0.9 % sodium chloride infusion   Intravenous Continuous Laya Espana MD 50 mL/hr at 01/21/18 0053      sodium chloride flush 0.9 % injection 10 mL  10 mL Intravenous 2 times per day Sita Guillory MD   10 mL at 01/22/18 0907    sodium chloride flush 0.9 % injection 10 mL  10 mL Intravenous PRN Sita Guillory MD        potassium chloride (KLOR-CON M) extended release tablet 40 mEq  40 mEq Oral PRN Laya Espana MD        Or    potassium chloride 20 MEQ/15ML (10%) oral solution 40 mEq  40 mEq Oral PRN Laya Espana MD        Or    potassium chloride 10 mEq/100 mL IVPB (Peripheral Line)  10 mEq Intravenous PRN Laya Espana MD        magnesium sulfate 1 g in dextrose 5% 100 mL IVPB  1 g Intravenous PRN Laya Espana MD        acetaminophen (TYLENOL) tablet 650 mg  650 mg Oral Q4H PRN Laya Espana MD        HYDROcodone-acetaminophen (NORCO) 5-325 MG per tablet 1 tablet  1 tablet Oral Q4H PRN Laya Espana MD   1 tablet at 01/22/18 1718    magnesium hydroxide (MILK OF MAGNESIA) 400 MG/5ML suspension 30 mL  30 mL Oral Daily PRN Laya Espana MD   30 mL at 01/21/18 1932    bisacodyl (DULCOLAX) suppository 10 mg  10 mg Rectal Daily PRN Laya Espana MD        ondansetron (ZOFRAN) injection 4 mg  4 mg Intravenous Q6H PRN Laya Espana MD        nicotine (NICODERM CQ) 21 MG/24HR 1 patch  1 patch Transdermal Daily PRN Laya Espana MD         Allergies   Allergen

## 2018-01-22 NOTE — PLAN OF CARE
Problem: Pain:  Goal: Pain level will decrease  Pain level will decrease   Outcome: Ongoing  Pt with c/o leg pains and requests pain meds x 1 this shift. 1 norco tab given and effective. Pt able to rate pain using scale. Continue to monitor pain level with hourly rounding.

## 2018-01-22 NOTE — PLAN OF CARE
Problem: Pain:  Goal: Pain level will decrease  Pain level will decrease   Outcome: Ongoing  Pain assessments completed, pain medications administered as ordered (see MAR), will continue to monitor. Problem: Falls - Risk of:  Goal: Will remain free from falls  Will remain free from falls   Outcome: Met This Shift  Pt remains free from falls/injury this shift, bed locked and in lowest position, call light and bedside table within reach, bed alarm activated, falling star posted outside of room, non-skid socks on, hourly rounding, will continue to monitor. Problem: Risk for Impaired Skin Integrity  Goal: Tissue integrity - skin and mucous membranes  Structural intactness and normal physiological function of skin and  mucous membranes.    Outcome: Ongoing  Skin assessment completed, mepilex dressing to RLE is clean/dry/intact, pt turns per self, I&Os monitored, skin and linens kept clean/dry, pillow support provided, will continue to monitor

## 2018-01-23 VITALS
HEART RATE: 81 BPM | OXYGEN SATURATION: 92 % | RESPIRATION RATE: 16 BRPM | WEIGHT: 173.6 LBS | HEIGHT: 73 IN | DIASTOLIC BLOOD PRESSURE: 65 MMHG | SYSTOLIC BLOOD PRESSURE: 141 MMHG | TEMPERATURE: 98.1 F | BODY MASS INDEX: 23.01 KG/M2

## 2018-01-23 LAB
ANION GAP SERPL CALCULATED.3IONS-SCNC: 11 MMOL/L (ref 9–17)
BUN BLDV-MCNC: 18 MG/DL (ref 8–23)
BUN/CREAT BLD: 23 (ref 9–20)
CALCIUM SERPL-MCNC: 9 MG/DL (ref 8.6–10.4)
CHLORIDE BLD-SCNC: 102 MMOL/L (ref 98–107)
CO2: 27 MMOL/L (ref 20–31)
CREAT SERPL-MCNC: 0.78 MG/DL (ref 0.7–1.2)
GFR AFRICAN AMERICAN: >60 ML/MIN
GFR NON-AFRICAN AMERICAN: >60 ML/MIN
GFR SERPL CREATININE-BSD FRML MDRD: ABNORMAL ML/MIN/{1.73_M2}
GFR SERPL CREATININE-BSD FRML MDRD: ABNORMAL ML/MIN/{1.73_M2}
GLUCOSE BLD-MCNC: 124 MG/DL (ref 75–110)
GLUCOSE BLD-MCNC: 150 MG/DL (ref 75–110)
GLUCOSE BLD-MCNC: 165 MG/DL (ref 70–99)
GLUCOSE BLD-MCNC: 178 MG/DL (ref 75–110)
HCT VFR BLD CALC: 34.7 % (ref 41–53)
HEMOGLOBIN: 11.1 G/DL (ref 13.5–17.5)
MCH RBC QN AUTO: 28.9 PG (ref 26–34)
MCHC RBC AUTO-ENTMCNC: 32 G/DL (ref 31–37)
MCV RBC AUTO: 90.3 FL (ref 80–100)
NRBC AUTOMATED: ABNORMAL PER 100 WBC
PDW BLD-RTO: 15.9 % (ref 11.5–14.5)
PLATELET # BLD: 432 K/UL (ref 130–400)
PMV BLD AUTO: 7.2 FL (ref 6–12)
POTASSIUM SERPL-SCNC: 4.4 MMOL/L (ref 3.7–5.3)
RBC # BLD: 3.84 M/UL (ref 4.5–5.9)
SODIUM BLD-SCNC: 140 MMOL/L (ref 135–144)
WBC # BLD: 10 K/UL (ref 3.5–11)

## 2018-01-23 PROCEDURE — 36415 COLL VENOUS BLD VENIPUNCTURE: CPT

## 2018-01-23 PROCEDURE — 85027 COMPLETE CBC AUTOMATED: CPT

## 2018-01-23 PROCEDURE — 99233 SBSQ HOSP IP/OBS HIGH 50: CPT | Performed by: SURGERY

## 2018-01-23 PROCEDURE — 97530 THERAPEUTIC ACTIVITIES: CPT

## 2018-01-23 PROCEDURE — 2580000003 HC RX 258: Performed by: FAMILY MEDICINE

## 2018-01-23 PROCEDURE — 80048 BASIC METABOLIC PNL TOTAL CA: CPT

## 2018-01-23 PROCEDURE — 82947 ASSAY GLUCOSE BLOOD QUANT: CPT

## 2018-01-23 PROCEDURE — 6370000000 HC RX 637 (ALT 250 FOR IP): Performed by: FAMILY MEDICINE

## 2018-01-23 PROCEDURE — 94640 AIRWAY INHALATION TREATMENT: CPT

## 2018-01-23 PROCEDURE — 99211 OFF/OP EST MAY X REQ PHY/QHP: CPT

## 2018-01-23 PROCEDURE — 6360000002 HC RX W HCPCS: Performed by: FAMILY MEDICINE

## 2018-01-23 RX ORDER — GUAIFENESIN 600 MG/1
1200 TABLET, EXTENDED RELEASE ORAL 2 TIMES DAILY PRN
Qty: 40 TABLET | Refills: 1 | Status: SHIPPED | OUTPATIENT
Start: 2018-01-23 | End: 2018-02-28 | Stop reason: SDUPTHER

## 2018-01-23 RX ORDER — BUDESONIDE 0.5 MG/2ML
500 INHALANT ORAL 2 TIMES DAILY
Qty: 60 AMPULE | Refills: 0 | Status: SHIPPED | OUTPATIENT
Start: 2018-01-23 | End: 2019-01-10

## 2018-01-23 RX ORDER — IPRATROPIUM BROMIDE AND ALBUTEROL SULFATE 2.5; .5 MG/3ML; MG/3ML
1 SOLUTION RESPIRATORY (INHALATION) EVERY 6 HOURS PRN
Qty: 360 ML | Refills: 0 | Status: SHIPPED | OUTPATIENT
Start: 2018-01-23 | End: 2018-09-12 | Stop reason: ALTCHOICE

## 2018-01-23 RX ORDER — HYDROCODONE BITARTRATE AND ACETAMINOPHEN 5; 325 MG/1; MG/1
1 TABLET ORAL EVERY 4 HOURS PRN
Qty: 30 TABLET | Refills: 0 | Status: SHIPPED | OUTPATIENT
Start: 2018-01-23 | End: 2018-01-30

## 2018-01-23 RX ORDER — IPRATROPIUM BROMIDE AND ALBUTEROL SULFATE 2.5; .5 MG/3ML; MG/3ML
1 SOLUTION RESPIRATORY (INHALATION) EVERY 6 HOURS PRN
Status: CANCELLED | OUTPATIENT
Start: 2018-01-23

## 2018-01-23 RX ORDER — LEVOFLOXACIN 750 MG/1
750 TABLET ORAL DAILY
Qty: 7 TABLET | Refills: 0 | Status: SHIPPED | OUTPATIENT
Start: 2018-01-23 | End: 2018-01-30

## 2018-01-23 RX ORDER — TRAZODONE HYDROCHLORIDE 100 MG/1
50 TABLET ORAL NIGHTLY PRN
Qty: 10 TABLET | Refills: 0 | Status: SHIPPED | OUTPATIENT
Start: 2018-01-23 | End: 2018-02-01 | Stop reason: SDUPTHER

## 2018-01-23 RX ADMIN — PANTOPRAZOLE SODIUM 40 MG: 40 TABLET, DELAYED RELEASE ORAL at 07:37

## 2018-01-23 RX ADMIN — LEVOFLOXACIN 500 MG: 5 INJECTION, SOLUTION INTRAVENOUS at 16:37

## 2018-01-23 RX ADMIN — METOPROLOL TARTRATE 25 MG: 25 TABLET ORAL at 07:39

## 2018-01-23 RX ADMIN — IPRATROPIUM BROMIDE AND ALBUTEROL SULFATE 1 AMPULE: .5; 3 SOLUTION RESPIRATORY (INHALATION) at 08:12

## 2018-01-23 RX ADMIN — FERROUS SULFATE TAB EC 325 MG (65 MG FE EQUIVALENT) 325 MG: 325 (65 FE) TABLET DELAYED RESPONSE at 07:37

## 2018-01-23 RX ADMIN — ASPIRIN 81 MG: 81 TABLET, COATED ORAL at 07:40

## 2018-01-23 RX ADMIN — Medication 10 ML: at 07:36

## 2018-01-23 RX ADMIN — IPRATROPIUM BROMIDE AND ALBUTEROL SULFATE 1 AMPULE: .5; 3 SOLUTION RESPIRATORY (INHALATION) at 18:18

## 2018-01-23 RX ADMIN — LISINOPRIL 10 MG: 10 TABLET ORAL at 07:40

## 2018-01-23 RX ADMIN — AMLODIPINE BESYLATE 5 MG: 5 TABLET ORAL at 07:40

## 2018-01-23 RX ADMIN — CLOPIDOGREL BISULFATE 75 MG: 75 TABLET ORAL at 07:39

## 2018-01-23 RX ADMIN — HYDROCODONE BITARTRATE AND ACETAMINOPHEN 1 TABLET: 5; 325 TABLET ORAL at 16:37

## 2018-01-23 RX ADMIN — HYDROCODONE BITARTRATE AND ACETAMINOPHEN 1 TABLET: 5; 325 TABLET ORAL at 02:50

## 2018-01-23 RX ADMIN — TAMSULOSIN HYDROCHLORIDE 0.4 MG: 0.4 CAPSULE ORAL at 07:39

## 2018-01-23 RX ADMIN — APIXABAN 5 MG: 5 TABLET, FILM COATED ORAL at 07:40

## 2018-01-23 RX ADMIN — PANTOPRAZOLE SODIUM 40 MG: 40 TABLET, DELAYED RELEASE ORAL at 16:37

## 2018-01-23 RX ADMIN — POLYETHYLENE GLYCOL 3350 17 G: 17 POWDER, FOR SOLUTION ORAL at 01:44

## 2018-01-23 RX ADMIN — IPRATROPIUM BROMIDE AND ALBUTEROL SULFATE 1 AMPULE: .5; 3 SOLUTION RESPIRATORY (INHALATION) at 14:40

## 2018-01-23 RX ADMIN — INSULIN GLARGINE 50 UNITS: 100 INJECTION, SOLUTION SUBCUTANEOUS at 08:49

## 2018-01-23 RX ADMIN — INSULIN LISPRO 2 UNITS: 100 INJECTION, SOLUTION INTRAVENOUS; SUBCUTANEOUS at 12:08

## 2018-01-23 RX ADMIN — HYDROCODONE BITARTRATE AND ACETAMINOPHEN 1 TABLET: 5; 325 TABLET ORAL at 07:36

## 2018-01-23 ASSESSMENT — ENCOUNTER SYMPTOMS
DIARRHEA: 0
COUGH: 1
SHORTNESS OF BREATH: 0

## 2018-01-23 ASSESSMENT — PAIN SCALES - GENERAL
PAINLEVEL_OUTOF10: 7
PAINLEVEL_OUTOF10: 7
PAINLEVEL_OUTOF10: 5
PAINLEVEL_OUTOF10: 0
PAINLEVEL_OUTOF10: 0
PAINLEVEL_OUTOF10: 7

## 2018-01-23 NOTE — PROGRESS NOTES
Physical Therapy  Facility/Department: [facility] CARE  Daily Treatment Note  NAME: Kayla Torres  : 1955  MRN: [id number]  Discharge Recommendation:   [address]  Date of Service: 2018    Patient Diagnosis(es):   Patient Active Problem List    Diagnosis Date Noted    Carotid stenosis, asymptomatic, bilateral 2018    Syncope 2018    Syncope and collapse 2018    Fungal dermatitis 2017    Bacterial pneumonia 2017    Other pulmonary embolism without acute cor pulmonale ([address] 2017    Sepsis due to methicillin resistant Staphylococcus aureus (MRSA) ([address]     Benign essential HTN     History of esophageal cancer     Osteomyelitis, chronic, ankle or foot ([address]     [facility]     Functional diarrhea     Cellulitis of right heel     PVD (peripheral vascular disease) ([address]     History of colon polyps     Marijuana use     Chronic pain syndrome     Closed fracture of humerus     DM type 2 with diabetic peripheral neuropathy (HCC)     Chronic obstructive pulmonary disease (HCC)     Hyperlipidemia     Gastroesophageal reflux disease     Fracture of humerus, left, closed 2015    Esophageal cancer ([address] 10/23/2014    Lung nodules 2014    Dysphagia 05/15/2013    History of cancer     Diabetic polyneuropathy (HCC)     GERD (gastroesophageal reflux disease)     Allergic rhinitis     Tobacco abuse     COPD (chronic obstructive pulmonary disease) ([address]     Edentulous     Neuropathic pain, leg 2012    Type 2 diabetes mellitus with diabetic polyneuropathy, with long-term current use of insulin ([address] 2012       Past Medical History:   Diagnosis Date    Allergic rhinitis     COPD (chronic obstructive pulmonary disease) (HCC)     Diabetic neuropathy ([address]     dr. Franck Way, podiatrist    Dizziness     DM (diabetes mellitus) ([address]     , endocrinologist    Esophageal

## 2018-01-23 NOTE — CARE COORDINATION
LAVELL received call from pt RN Dary Russell regarding pt transport has been scheduled for 6:30pm.  LAVELL will fax the discharge paperwork to the facility and inform of transport time.      RN to call report [phone] [facility]
States he was at [facility] 4-5 days and discharged home. [facility] Discussed PT eval for SNF with pt. He is agreeable to return to [facility]. Referral called to [facility] initiated.          Electronically signed by Mayda Segovia RN on 1/22/18 at 12:50 PM

## 2018-01-23 NOTE — PROGRESS NOTES
amputation.      Signature      ----------------------------------------------------------------   Electronically signed by Lesvia Farrell(Sonographer) on   01/22/2018 10:21 AM   ----------------------------------------------------------------      ----------------------------------------------------------------   Electronically signed by Elvira Larry(Interpreting   physician) on 01/22/2018 12:29 PM   ----------------------------------------------------------------      POC Glucose Fingerstick [[id number]] (Abnormal) Collected: 01/22/18 1141   Updated: 01/22/18 1220     POC Glucose 141 (H) mg/dL   EKG REPORT [[id number]] Resulted: 01/19/18 1714   Updated: 01/22/18 0926    EKG 12 lead [[id number]] Collected: 01/19/18 1714   Updated: 01/22/18 0925     Ventricular Rate 71 BPM    Atrial Rate 71 BPM    P-R Interval 128 ms    QRS Duration 92 ms    Q-T Interval 370 ms    QTc Calculation (Bazett) 402 ms    P Axis 76 degrees    R Axis 48 degrees    T Axis 11 degrees   Narrative:     Normal sinus rhythm  Normal ECG  When compared with ECG of 19-JAN-2018 10:45, (unconfirmed)  ST no longer elevated in Inferior leads  Inverted T waves have replaced nonspecific T wave abnormality in Inferior leads   Basic Metabolic Panel w/ Reflex to MG [[id number]] (Abnormal) Collected: 01/22/18 0717   Updated: 01/22/18 0853    Specimen Source: Blood     Glucose 140 (H) mg/dL    BUN 15 mg/dL    CREATININE 0.81 mg/dL    Bun/Cre Ratio 19    Calcium 8.5 (L) mg/dL    Sodium 141 mmol/L    Potassium 4.1 mmol/L    Chloride 104 mmol/L    CO2 27 mmol/L    Anion Gap 10 mmol/L    GFR Non-African American >60 mL/min    GFR African American >60 mL/min       Assessment:    Condition: In stable condition. Improving.    (Patient Active Problem List:     Type 2 diabetes mellitus with diabetic polyneuropathy, with long-term current use of insulin (HCC)     Neuropathic pain, leg     History of cancer     Diabetic polyneuropathy (HCC)     GERD (gastroesophageal reflux disease)     Allergic rhinitis     Tobacco abuse     COPD (chronic obstructive pulmonary disease) (HCC)     Edentulous     Dysphagia     Lung nodules     Esophageal cancer (HCC)     Fracture of humerus, left, closed     Closed fracture of humerus     DM type 2 with diabetic peripheral neuropathy (HCC)     Chronic obstructive pulmonary disease (HCC)     Hyperlipidemia     Gastroesophageal reflux disease     Chronic pain syndrome     Marijuana use     History of colon polyps     Cellulitis of right heel     PVD (peripheral vascular disease) (HCC)     Functional diarrhea     Sepsis due to methicillin resistant Staphylococcus aureus (MRSA) (HCC)     Benign essential HTN     History of esophageal cancer     Osteomyelitis, chronic, ankle or foot (HCC)     PAD (peripheral artery disease) ([facility]     Other pulmonary embolism without acute cor pulmonale (HCC)     Bacterial pneumonia     Fungal dermatitis     Syncope     Syncope and collapse     Carotid stenosis, asymptomatic, bilateral    ). Plan:   (Increase Insulin  Pt much better  Pne resolving  COPD much improved  Needs Pulmcort for 3 weeks  [facility] be [facility] or [facility]  HTN stable  DM better  Pt agrees to PT w prosthesis  Plan DC today to [facility], Vascular).        Joselito Davis MD  1/23/2018

## 2018-01-23 NOTE — DISCHARGE INSTR - DIET
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
## 2018-01-23 NOTE — PLAN OF CARE
Problem: Pain:  Goal: Pain level will decrease  Pain level will decrease   Outcome: Ongoing      Problem: Falls - Risk of:  Goal: Will remain free from falls  Will remain free from falls   Outcome: Ongoing      Problem: Risk for Impaired Skin Integrity  Goal: Tissue integrity - skin and mucous membranes  Structural intactness and normal physiological function of skin and  mucous membranes.    Outcome: Ongoing

## 2018-01-24 ENCOUNTER — CARE COORDINATION (OUTPATIENT)
Dept: CASE MANAGEMENT | Age: 63
End: 2018-01-24

## 2018-01-27 ENCOUNTER — CARE COORDINATION (OUTPATIENT)
Dept: CASE MANAGEMENT | Age: 63
End: 2018-01-27

## 2018-01-31 ENCOUNTER — CARE COORDINATION (OUTPATIENT)
Dept: CASE MANAGEMENT | Age: 63
End: 2018-01-31

## 2018-01-31 ENCOUNTER — CARE COORDINATION (OUTPATIENT)
Dept: CARE COORDINATION | Age: 63
End: 2018-01-31

## 2018-01-31 NOTE — CARE COORDINATION
Alejandro Russell is a 63year old male who is a pt of Dr. Tequila Lea. A referral was sent to social work from [facility] requesting assistance with transportation.  spoke with Lexis Benavides who reports he has a follow up appointment tomorrow 2/1 at 3:00 at [facility] and another appointment on 2/14 and did not know the street name or office building name. Lexis Benavdies reports that he only has Medicare and doesn't know what happened to his Medicaid.  encouraged Lexis Benavides to start calling friends and family to assist with transport tomorrow due to short notice,  will more than likely not be able to find a ride for him.  Plan:     will call transportation resources.

## 2018-02-01 ENCOUNTER — CARE COORDINATION (OUTPATIENT)
Dept: CASE MANAGEMENT | Age: 63
End: 2018-02-01

## 2018-02-01 ENCOUNTER — CARE COORDINATION (OUTPATIENT)
Dept: CARE COORDINATION | Age: 63
End: 2018-02-01

## 2018-02-01 ENCOUNTER — OFFICE VISIT (OUTPATIENT)
Dept: FAMILY MEDICINE CLINIC | Age: 63
End: 2018-02-01
Payer: MEDICARE

## 2018-02-01 VITALS
TEMPERATURE: 97.7 F | BODY MASS INDEX: 23.59 KG/M2 | HEIGHT: 73 IN | SYSTOLIC BLOOD PRESSURE: 114 MMHG | WEIGHT: 178 LBS | DIASTOLIC BLOOD PRESSURE: 65 MMHG | HEART RATE: 84 BPM

## 2018-02-01 DIAGNOSIS — G47.00 INSOMNIA, UNSPECIFIED TYPE: ICD-10-CM

## 2018-02-01 DIAGNOSIS — S88.111A AMPUTATION OF RIGHT LOWER EXTREMITY BELOW KNEE (HCC): ICD-10-CM

## 2018-02-01 DIAGNOSIS — K21.9 GASTROESOPHAGEAL REFLUX DISEASE, ESOPHAGITIS PRESENCE NOT SPECIFIED: ICD-10-CM

## 2018-02-01 DIAGNOSIS — J44.9 CHRONIC OBSTRUCTIVE PULMONARY DISEASE, UNSPECIFIED COPD TYPE (HCC): Primary | ICD-10-CM

## 2018-02-01 DIAGNOSIS — Z89.619: ICD-10-CM

## 2018-02-01 DIAGNOSIS — Z12.11 SCREEN FOR COLON CANCER: ICD-10-CM

## 2018-02-01 DIAGNOSIS — Z89.619 AMPUTATION STATUS OF LOWER EXTREMITY: ICD-10-CM

## 2018-02-01 PROCEDURE — 99495 TRANSJ CARE MGMT MOD F2F 14D: CPT | Performed by: STUDENT IN AN ORGANIZED HEALTH CARE EDUCATION/TRAINING PROGRAM

## 2018-02-01 PROCEDURE — 1111F DSCHRG MED/CURRENT MED MERGE: CPT | Performed by: STUDENT IN AN ORGANIZED HEALTH CARE EDUCATION/TRAINING PROGRAM

## 2018-02-01 PROCEDURE — 99213 OFFICE O/P EST LOW 20 MIN: CPT

## 2018-02-01 RX ORDER — ALBUTEROL SULFATE 90 UG/1
AEROSOL, METERED RESPIRATORY (INHALATION)
Qty: 18 G | Refills: 5 | Status: SHIPPED | OUTPATIENT
Start: 2018-02-01 | End: 2018-09-12 | Stop reason: ALTCHOICE

## 2018-02-01 RX ORDER — ESOMEPRAZOLE MAGNESIUM 40 MG/1
CAPSULE, DELAYED RELEASE ORAL
Qty: 60 CAPSULE | Refills: 0 | Status: SHIPPED | OUTPATIENT
Start: 2018-02-01 | End: 2018-02-28 | Stop reason: SDUPTHER

## 2018-02-01 RX ORDER — TRAZODONE HYDROCHLORIDE 50 MG/1
50 TABLET ORAL NIGHTLY PRN
Qty: 30 TABLET | Refills: 3 | Status: SHIPPED | OUTPATIENT
Start: 2018-02-01 | End: 2018-03-19 | Stop reason: SDUPTHER

## 2018-02-01 NOTE — CARE COORDINATION
[facility] Call    Call within 2 business days of discharge: Yes    Patient: Azul [facility] Patient : 1955   MRN: [id number]  Reason for Admission: There are no discharge diagnoses documented for the most recent discharge. Discharge Date: 18 RARS: [facility] Risk Score: 26.75     Spoke with: [facility]: [facility] Discharge 18    Non-face-to-face services provided:  Obtained and reviewed discharge summary and/or continuity of care documents  Communication with home health agencies or other community services the patient is currently using-[facility] in [facility], [facility] in [facility]. [place] [address] 24 Hour Call    Schedule Follow Up Appointment with PCP:  Completed  Do you have a copy of your discharge instructions?:  Yes  Do you have all of your prescriptions and are they filled?:  No  Have you scheduled your follow up appointment?:  Yes  How are you going to get to your appointment?:  Other (Comment: [facility])  Were you discharged with any Home Care or Post Acute Services:  Yes  Post Acute Services:  Home Health (Comment: Marcie Bender)  Patient DME:  Rajinder Poplar, Straight cane, Other  Other Patient DME:  Glucometer, R prosthesis for above the knee amputation. Patient Home Equipment:  Nebulizer  Do you have support at home?:  Alone  Are you an active caregiver in your home?:  No  Care Transitions Interventions    Physical Therapy:  Completed Other Services:  Completed (Comment: [facility])     Social Work:  Completed       Reports has no medications. Called [facility] and spoke with Andreas, Pharmacist who states they have not received any orders and were not aware that patient was home. Reported that he discharged home 18 from [facility] and does not have medication. Obtained fax number. Anup Cortez states he will await medication orders from SNF and get them out to him today.   Called [facility] and attempted to speak to [place] or Clinical nurse manager. They were not available. Voice message left with Az MACIAS. Requested faxed medication orders for patient to [facility] to [phone]. Requested a follow up call back to verify that this has been done, contact information provided. Notified Chavo Terry that transportation has been set up for him. They plan to arrive at his home at 2:30. Easton Rowan understanding. Attempted to call Aniyashaealberto Terry back with an update for his medication needs and to explain to him he needs to call a [employer] to pick him up after his appointment. He did not answer, no ability to leave message. Will forward this to the PCP to please give him the message to call [email or web address] For  after appointment.     Follow Up  Future Appointments  Date Time Provider Jordon Frost   2/1/2018 3:30 PM Kelton Vazquez MD [address] [place] [address], RN

## 2018-02-08 ENCOUNTER — CARE COORDINATION (OUTPATIENT)
Dept: CARE COORDINATION | Age: 63
End: 2018-02-08
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
## 2018-02-19 ENCOUNTER — CARE COORDINATION (OUTPATIENT)
Dept: CARE COORDINATION | Age: 63
End: 2018-02-19

## 2018-02-19 NOTE — CARE COORDINATION
Attempting to reach patient for a follow up care coordination call.     Atul Morales, RNCC   Filling in for Mickie Almaraz, [facility]

## 2018-02-23 ENCOUNTER — CARE COORDINATION (OUTPATIENT)
Dept: CARE COORDINATION | Age: 63
End: 2018-02-23

## 2018-02-23 NOTE — CARE COORDINATION
phoned Loreto Johnson in hospitality to set up transportation for Marquita Stevens.   Taxi is setup to be at [employer] at 1:30pm.

## 2018-02-27 DIAGNOSIS — E11.42 TYPE 2 DIABETES MELLITUS WITH DIABETIC POLYNEUROPATHY, WITH LONG-TERM CURRENT USE OF INSULIN (HCC): ICD-10-CM

## 2018-02-27 DIAGNOSIS — Z79.4 TYPE 2 DIABETES MELLITUS WITH DIABETIC POLYNEUROPATHY, WITH LONG-TERM CURRENT USE OF INSULIN (HCC): ICD-10-CM

## 2018-02-27 RX ORDER — INSULIN GLARGINE 300 U/ML
75 INJECTION, SOLUTION SUBCUTANEOUS NIGHTLY
Refills: 0 | OUTPATIENT
Start: 2018-02-27

## 2018-02-28 ENCOUNTER — HOSPITAL ENCOUNTER (OUTPATIENT)
Age: 63
Setting detail: SPECIMEN
Discharge: HOME | End: 2018-02-28
Payer: MEDICARE

## 2018-02-28 ENCOUNTER — OFFICE VISIT (OUTPATIENT)
Dept: FAMILY MEDICINE CLINIC | Age: 63
End: 2018-02-28
Payer: MEDICARE

## 2018-02-28 VITALS
HEART RATE: 91 BPM | WEIGHT: 177 LBS | SYSTOLIC BLOOD PRESSURE: 122 MMHG | TEMPERATURE: 98 F | RESPIRATION RATE: 16 BRPM | DIASTOLIC BLOOD PRESSURE: 66 MMHG | HEIGHT: 68 IN | BODY MASS INDEX: 26.83 KG/M2 | OXYGEN SATURATION: 93 %

## 2018-02-28 DIAGNOSIS — Z79.4 TYPE 2 DIABETES MELLITUS WITH DIABETIC POLYNEUROPATHY, WITH LONG-TERM CURRENT USE OF INSULIN (HCC): Primary | ICD-10-CM

## 2018-02-28 DIAGNOSIS — L97.509 TYPE 2 DIABETES MELLITUS WITH FOOT ULCER, WITHOUT LONG-TERM CURRENT USE OF INSULIN (HCC): ICD-10-CM

## 2018-02-28 DIAGNOSIS — Z79.4 TYPE 2 DIABETES MELLITUS WITH DIABETIC POLYNEUROPATHY, WITH LONG-TERM CURRENT USE OF INSULIN (HCC): ICD-10-CM

## 2018-02-28 DIAGNOSIS — M79.2 NEUROPATHIC PAIN OF RIGHT LOWER EXTREMITY: ICD-10-CM

## 2018-02-28 DIAGNOSIS — G89.4 CHRONIC PAIN SYNDROME: ICD-10-CM

## 2018-02-28 DIAGNOSIS — E11.621 TYPE 2 DIABETES MELLITUS WITH FOOT ULCER, WITHOUT LONG-TERM CURRENT USE OF INSULIN (HCC): ICD-10-CM

## 2018-02-28 DIAGNOSIS — K21.9 GASTROESOPHAGEAL REFLUX DISEASE, ESOPHAGITIS PRESENCE NOT SPECIFIED: ICD-10-CM

## 2018-02-28 DIAGNOSIS — E11.42 TYPE 2 DIABETES MELLITUS WITH DIABETIC POLYNEUROPATHY, WITH LONG-TERM CURRENT USE OF INSULIN (HCC): ICD-10-CM

## 2018-02-28 DIAGNOSIS — E11.42 TYPE 2 DIABETES MELLITUS WITH DIABETIC POLYNEUROPATHY, WITH LONG-TERM CURRENT USE OF INSULIN (HCC): Primary | ICD-10-CM

## 2018-02-28 DIAGNOSIS — E11.42 DIABETIC POLYNEUROPATHY ASSOCIATED WITH TYPE 2 DIABETES MELLITUS (HCC): ICD-10-CM

## 2018-02-28 DIAGNOSIS — I73.9 PVD (PERIPHERAL VASCULAR DISEASE) (HCC): ICD-10-CM

## 2018-02-28 DIAGNOSIS — J43.9 PULMONARY EMPHYSEMA, UNSPECIFIED EMPHYSEMA TYPE (HCC): ICD-10-CM

## 2018-02-28 LAB
ABSOLUTE EOS #: 0.46 K/UL (ref 0–0.44)
ABSOLUTE IMMATURE GRANULOCYTE: 0.09 K/UL (ref 0–0.3)
ABSOLUTE LYMPH #: 2.64 K/UL (ref 1.1–3.7)
ABSOLUTE MONO #: 1 K/UL (ref 0.1–1.2)
BASOPHILS # BLD: 1 % (ref 0–2)
BASOPHILS ABSOLUTE: 0.1 K/UL (ref 0–0.2)
CHOLESTEROL/HDL RATIO: 4.2
CHOLESTEROL: 163 MG/DL
DIFFERENTIAL TYPE: ABNORMAL
EOSINOPHILS RELATIVE PERCENT: 3 % (ref 1–4)
FOLATE: 9.2 NG/ML
HBA1C MFR BLD: 7.9 %
HCT VFR BLD CALC: 44.1 % (ref 40.7–50.3)
HDLC SERPL-MCNC: 39 MG/DL
HEMOGLOBIN: 12.8 G/DL (ref 13–17)
IMMATURE GRANULOCYTES: 1 %
IRON SATURATION: 10 % (ref 20–55)
IRON: 27 UG/DL (ref 59–158)
LDL CHOLESTEROL: 64 MG/DL (ref 0–130)
LYMPHOCYTES # BLD: 15 % (ref 24–43)
MAGNESIUM: 2.2 MG/DL (ref 1.6–2.6)
MCH RBC QN AUTO: 27.8 PG (ref 25.2–33.5)
MCHC RBC AUTO-ENTMCNC: 29 G/DL (ref 28.4–34.8)
MCV RBC AUTO: 95.7 FL (ref 82.6–102.9)
MONOCYTES # BLD: 6 % (ref 3–12)
NRBC AUTOMATED: 0 PER 100 WBC
PDW BLD-RTO: 13.5 % (ref 11.8–14.4)
PLATELET # BLD: 585 K/UL (ref 138–453)
PLATELET ESTIMATE: ABNORMAL
PMV BLD AUTO: 10 FL (ref 8.1–13.5)
RBC # BLD: 4.61 M/UL (ref 4.21–5.77)
RBC # BLD: ABNORMAL 10*6/UL
SEG NEUTROPHILS: 74 % (ref 36–65)
SEGMENTED NEUTROPHILS ABSOLUTE COUNT: 13.3 K/UL (ref 1.5–8.1)
TOTAL IRON BINDING CAPACITY: 281 UG/DL (ref 250–450)
TRIGL SERPL-MCNC: 300 MG/DL
UNSATURATED IRON BINDING CAPACITY: 254 UG/DL (ref 112–347)
VITAMIN B-12: 429 PG/ML (ref 232–1245)
VLDLC SERPL CALC-MCNC: ABNORMAL MG/DL (ref 1–30)
WBC # BLD: 17.6 K/UL (ref 3.5–11.3)
WBC # BLD: ABNORMAL 10*3/UL

## 2018-02-28 PROCEDURE — G8598 ASA/ANTIPLAT THER USED: HCPCS | Performed by: INTERNAL MEDICINE

## 2018-02-28 PROCEDURE — 3023F SPIROM DOC REV: CPT | Performed by: INTERNAL MEDICINE

## 2018-02-28 PROCEDURE — G8926 SPIRO NO PERF OR DOC: HCPCS | Performed by: INTERNAL MEDICINE

## 2018-02-28 PROCEDURE — 99214 OFFICE O/P EST MOD 30 MIN: CPT | Performed by: INTERNAL MEDICINE

## 2018-02-28 PROCEDURE — 4004F PT TOBACCO SCREEN RCVD TLK: CPT | Performed by: INTERNAL MEDICINE

## 2018-02-28 PROCEDURE — 83036 HEMOGLOBIN GLYCOSYLATED A1C: CPT | Performed by: INTERNAL MEDICINE

## 2018-02-28 PROCEDURE — 3017F COLORECTAL CA SCREEN DOC REV: CPT | Performed by: INTERNAL MEDICINE

## 2018-02-28 PROCEDURE — 3045F PR MOST RECENT HEMOGLOBIN A1C LEVEL 7.0-9.0%: CPT | Performed by: INTERNAL MEDICINE

## 2018-02-28 PROCEDURE — G8484 FLU IMMUNIZE NO ADMIN: HCPCS | Performed by: INTERNAL MEDICINE

## 2018-02-28 PROCEDURE — 94640 AIRWAY INHALATION TREATMENT: CPT | Performed by: INTERNAL MEDICINE

## 2018-02-28 PROCEDURE — G8419 CALC BMI OUT NRM PARAM NOF/U: HCPCS | Performed by: INTERNAL MEDICINE

## 2018-02-28 PROCEDURE — G8427 DOCREV CUR MEDS BY ELIG CLIN: HCPCS | Performed by: INTERNAL MEDICINE

## 2018-02-28 RX ORDER — ALBUTEROL SULFATE 1.25 MG/3ML
1.25 SOLUTION RESPIRATORY (INHALATION) ONCE
Status: COMPLETED | OUTPATIENT
Start: 2018-02-28 | End: 2018-02-28

## 2018-02-28 RX ORDER — GUAIFENESIN 600 MG/1
1200 TABLET, EXTENDED RELEASE ORAL 2 TIMES DAILY PRN
Qty: 40 TABLET | Refills: 1 | Status: SHIPPED | OUTPATIENT
Start: 2018-02-28 | End: 2018-08-15 | Stop reason: ALTCHOICE

## 2018-02-28 RX ORDER — ESOMEPRAZOLE MAGNESIUM 40 MG/1
CAPSULE, DELAYED RELEASE ORAL
Qty: 60 CAPSULE | Refills: 0 | Status: ON HOLD | OUTPATIENT
Start: 2018-02-28 | End: 2018-07-10 | Stop reason: ALTCHOICE

## 2018-02-28 RX ADMIN — Medication 0.5 MG: at 13:53

## 2018-02-28 RX ADMIN — ALBUTEROL SULFATE 1.25 MG: 1.25 SOLUTION RESPIRATORY (INHALATION) at 13:52

## 2018-02-28 ASSESSMENT — ENCOUNTER SYMPTOMS
COUGH: 1
CHEST TIGHTNESS: 1
CONSTIPATION: 0
SHORTNESS OF BREATH: 1
CHOKING: 0
STRIDOR: 0
NAUSEA: 0
BLURRED VISION: 0
DIARRHEA: 0
RECTAL PAIN: 0
WHEEZING: 1
APNEA: 0
VOMITING: 0
ABDOMINAL PAIN: 0
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
## 2018-03-02 ENCOUNTER — CARE COORDINATION (OUTPATIENT)
Dept: CARE COORDINATION | Age: 63
End: 2018-03-02

## 2018-03-05 DIAGNOSIS — K59.09 OTHER CONSTIPATION: Primary | ICD-10-CM

## 2018-03-05 RX ORDER — DOXYCYCLINE HYCLATE 100 MG
100 TABLET ORAL 2 TIMES DAILY
Qty: 20 TABLET | Refills: 0 | Status: SHIPPED | OUTPATIENT
Start: 2018-03-05 | End: 2018-03-15

## 2018-03-05 RX ORDER — POLYETHYLENE GLYCOL 3350 17 G/17G
17 POWDER, FOR SOLUTION ORAL DAILY PRN
Qty: 527 G | Refills: 1 | Status: SHIPPED | OUTPATIENT
Start: 2018-03-05 | End: 2018-04-04

## 2018-03-06 ENCOUNTER — CARE COORDINATION (OUTPATIENT)
Dept: CARE COORDINATION | Age: 63
End: 2018-03-06

## 2018-03-06 NOTE — CARE COORDINATION
Attempting to reach patient for a follow up care coordination call, unable to leave voicemail. Mailbox is full  Would like to see how his new patient appointment went.

## 2018-03-09 ENCOUNTER — CARE COORDINATION (OUTPATIENT)
Dept: CARE COORDINATION | Age: 63
End: 2018-03-09

## 2018-03-19 ENCOUNTER — CARE COORDINATION (OUTPATIENT)
Dept: CARE COORDINATION | Age: 63
End: 2018-03-19

## 2018-03-20 ENCOUNTER — CARE COORDINATION (OUTPATIENT)
Dept: CARE COORDINATION | Age: 63
End: 2018-03-20

## 2018-03-20 RX ORDER — TRAZODONE HYDROCHLORIDE 50 MG/1
50 TABLET ORAL NIGHTLY PRN
Qty: 30 TABLET | Refills: 3 | Status: ON HOLD | OUTPATIENT
Start: 2018-03-20 | End: 2018-07-10

## 2018-03-20 RX ORDER — PREGABALIN 75 MG/1
75 CAPSULE ORAL 2 TIMES DAILY
Qty: 60 CAPSULE | Refills: 3 | Status: SHIPPED | OUTPATIENT
Start: 2018-03-20 | End: 2018-08-15 | Stop reason: ALTCHOICE

## 2018-03-20 NOTE — CARE COORDINATION
Spoke with patient explained that we set some samples aside of Toujeo and Novolog at PCP office for him to . CCPLAN: continue to work with PCP to get RX for an insulin that is covered by insurance.

## 2018-03-27 ENCOUNTER — CARE COORDINATION (OUTPATIENT)
Dept: CARE COORDINATION | Age: 63
End: 2018-03-27

## 2018-03-29 ENCOUNTER — CARE COORDINATION (OUTPATIENT)
Dept: CARE COORDINATION | Age: 63
End: 2018-03-29

## 2018-04-02 ENCOUNTER — CARE COORDINATION (OUTPATIENT)
Dept: CARE COORDINATION | Age: 63
End: 2018-04-02

## 2018-04-06 ENCOUNTER — CARE COORDINATION (OUTPATIENT)
Dept: CARE COORDINATION | Age: 63
End: 2018-04-06

## 2018-04-09 ENCOUNTER — TELEPHONE (OUTPATIENT)
Dept: ADMINISTRATIVE | Age: 63
End: 2018-04-09

## 2018-04-18 ENCOUNTER — CARE COORDINATION (OUTPATIENT)
Dept: CARE COORDINATION | Age: 63
End: 2018-04-18

## 2018-04-25 ENCOUNTER — CARE COORDINATION (OUTPATIENT)
Dept: CARE COORDINATION | Age: 63
End: 2018-04-25

## 2018-04-26 ENCOUNTER — CARE COORDINATION (OUTPATIENT)
Dept: CARE COORDINATION | Age: 63
End: 2018-04-26

## 2018-05-04 ENCOUNTER — CARE COORDINATION (OUTPATIENT)
Dept: CARE COORDINATION | Age: 63
End: 2018-05-04

## 2018-05-06 DIAGNOSIS — J43.9 PULMONARY EMPHYSEMA, UNSPECIFIED EMPHYSEMA TYPE (HCC): ICD-10-CM

## 2018-05-07 RX ORDER — UMECLIDINIUM 62.5 UG/1
AEROSOL, POWDER ORAL
Qty: 60 EACH | Refills: 0 | Status: SHIPPED | OUTPATIENT
Start: 2018-05-07 | End: 2019-01-10

## 2018-05-08 ENCOUNTER — TELEPHONE (OUTPATIENT)
Dept: FAMILY MEDICINE CLINIC | Age: 63
End: 2018-05-08

## 2018-05-16 ENCOUNTER — CARE COORDINATION (OUTPATIENT)
Dept: CARE COORDINATION | Age: 63
End: 2018-05-16

## 2018-06-07 ENCOUNTER — CARE COORDINATION (OUTPATIENT)
Dept: CARE COORDINATION | Age: 63
End: 2018-06-07

## 2018-06-22 ENCOUNTER — CARE COORDINATION (OUTPATIENT)
Dept: CARE COORDINATION | Age: 63
End: 2018-06-22

## 2018-06-23 ENCOUNTER — APPOINTMENT (OUTPATIENT)
Dept: GENERAL RADIOLOGY | Age: 63
DRG: 463 | End: 2018-06-23
Payer: MEDICARE

## 2018-06-23 ENCOUNTER — HOSPITAL ENCOUNTER (INPATIENT)
Age: 63
LOS: 3 days | Discharge: NURSING, REHAB OR LONG-TERM CARE | DRG: 463 | End: 2018-06-26
Admitting: FAMILY MEDICINE
Payer: MEDICARE

## 2018-06-23 DIAGNOSIS — R73.9 HYPERGLYCEMIA: ICD-10-CM

## 2018-06-23 DIAGNOSIS — S42.201A CLOSED FRACTURE OF PROXIMAL END OF RIGHT HUMERUS, UNSPECIFIED FRACTURE MORPHOLOGY, INITIAL ENCOUNTER: Primary | ICD-10-CM

## 2018-06-23 DIAGNOSIS — F10.929 ACUTE ALCOHOLIC INTOXICATION WITH COMPLICATION (HCC): ICD-10-CM

## 2018-06-23 PROBLEM — S42.211A FX HUMERAL NECK, RIGHT, CLOSED, INITIAL ENCOUNTER: Status: ACTIVE | Noted: 2018-06-23

## 2018-06-23 LAB
ABSOLUTE EOS #: 0.1 K/UL (ref 0–0.4)
ABSOLUTE IMMATURE GRANULOCYTE: ABNORMAL K/UL (ref 0–0.3)
ABSOLUTE LYMPH #: 1.9 K/UL (ref 1–4.8)
ABSOLUTE MONO #: 0.9 K/UL (ref 0.2–0.8)
ACETAMINOPHEN LEVEL: <10 UG/ML (ref 10–30)
ANION GAP SERPL CALCULATED.3IONS-SCNC: 21 MMOL/L (ref 9–17)
BASOPHILS # BLD: 0 % (ref 0–2)
BASOPHILS ABSOLUTE: 0 K/UL (ref 0–0.2)
BETA-HYDROXYBUTYRATE: 0.46 MMOL/L (ref 0.02–0.27)
BUN BLDV-MCNC: 19 MG/DL (ref 8–23)
BUN/CREAT BLD: 16 (ref 9–20)
CALCIUM SERPL-MCNC: 8.3 MG/DL (ref 8.6–10.4)
CHLORIDE BLD-SCNC: 86 MMOL/L (ref 98–107)
CHP ED QC CHECK: NORMAL
CO2: 17 MMOL/L (ref 20–31)
CREAT SERPL-MCNC: 1.19 MG/DL (ref 0.7–1.2)
DIFFERENTIAL TYPE: ABNORMAL
EKG ATRIAL RATE: 94 BPM
EKG P AXIS: 51 DEGREES
EKG P-R INTERVAL: 138 MS
EKG Q-T INTERVAL: 360 MS
EKG QRS DURATION: 82 MS
EKG QTC CALCULATION (BAZETT): 450 MS
EKG R AXIS: 38 DEGREES
EKG T AXIS: 28 DEGREES
EKG VENTRICULAR RATE: 94 BPM
EOSINOPHILS RELATIVE PERCENT: 1 % (ref 1–4)
ETHANOL PERCENT: 0.17 %
ETHANOL: 168 MG/DL
GFR AFRICAN AMERICAN: >60 ML/MIN
GFR NON-AFRICAN AMERICAN: >60 ML/MIN
GFR SERPL CREATININE-BSD FRML MDRD: ABNORMAL ML/MIN/{1.73_M2}
GFR SERPL CREATININE-BSD FRML MDRD: ABNORMAL ML/MIN/{1.73_M2}
GLUCOSE BLD-MCNC: 315 MG/DL
GLUCOSE BLD-MCNC: 315 MG/DL (ref 75–110)
GLUCOSE BLD-MCNC: 379 MG/DL (ref 75–110)
GLUCOSE BLD-MCNC: 404 MG/DL (ref 70–99)
HCT VFR BLD CALC: 39.5 % (ref 41–53)
HEMOGLOBIN: 12.7 G/DL (ref 13.5–17.5)
IMMATURE GRANULOCYTES: ABNORMAL %
INR BLD: 0.9
LYMPHOCYTES # BLD: 19 % (ref 24–44)
MCH RBC QN AUTO: 27.8 PG (ref 26–34)
MCHC RBC AUTO-ENTMCNC: 32.3 G/DL (ref 31–37)
MCV RBC AUTO: 86.2 FL (ref 80–100)
MONOCYTES # BLD: 9 % (ref 1–7)
MYOGLOBIN: 263 NG/ML (ref 28–72)
NRBC AUTOMATED: ABNORMAL PER 100 WBC
PARTIAL THROMBOPLASTIN TIME: 27.3 SEC (ref 23–31)
PDW BLD-RTO: 16.3 % (ref 11.5–14.5)
PLATELET # BLD: 408 K/UL (ref 130–400)
PLATELET ESTIMATE: ABNORMAL
PMV BLD AUTO: 7.1 FL (ref 6–12)
POTASSIUM SERPL-SCNC: 4.2 MMOL/L (ref 3.7–5.3)
PROTHROMBIN TIME: 9.4 SEC (ref 9.7–11.6)
RBC # BLD: 4.58 M/UL (ref 4.5–5.9)
RBC # BLD: ABNORMAL 10*6/UL
SALICYLATE LEVEL: <1 MG/DL (ref 3–10)
SEG NEUTROPHILS: 71 % (ref 36–66)
SEGMENTED NEUTROPHILS ABSOLUTE COUNT: 7.2 K/UL (ref 1.8–7.7)
SODIUM BLD-SCNC: 124 MMOL/L (ref 135–144)
TOXIC TRICYCLIC SC,BLOOD: NEGATIVE
TROPONIN INTERP: ABNORMAL
TROPONIN T: <0.03 NG/ML
WBC # BLD: 10.1 K/UL (ref 3.5–11)
WBC # BLD: ABNORMAL 10*3/UL

## 2018-06-23 PROCEDURE — 2580000003 HC RX 258: Performed by: NURSE PRACTITIONER

## 2018-06-23 PROCEDURE — 36415 COLL VENOUS BLD VENIPUNCTURE: CPT

## 2018-06-23 PROCEDURE — 6370000000 HC RX 637 (ALT 250 FOR IP): Performed by: PHYSICIAN ASSISTANT

## 2018-06-23 PROCEDURE — 2500000003 HC RX 250 WO HCPCS

## 2018-06-23 PROCEDURE — 2580000003 HC RX 258

## 2018-06-23 PROCEDURE — 96375 TX/PRO/DX INJ NEW DRUG ADDON: CPT

## 2018-06-23 PROCEDURE — 6360000002 HC RX W HCPCS: Performed by: NURSE PRACTITIONER

## 2018-06-23 PROCEDURE — 80307 DRUG TEST PRSMV CHEM ANLYZR: CPT

## 2018-06-23 PROCEDURE — 71046 X-RAY EXAM CHEST 2 VIEWS: CPT

## 2018-06-23 PROCEDURE — 6360000002 HC RX W HCPCS

## 2018-06-23 PROCEDURE — 6370000000 HC RX 637 (ALT 250 FOR IP): Performed by: NURSE PRACTITIONER

## 2018-06-23 PROCEDURE — 85730 THROMBOPLASTIN TIME PARTIAL: CPT

## 2018-06-23 PROCEDURE — 83874 ASSAY OF MYOGLOBIN: CPT

## 2018-06-23 PROCEDURE — 85610 PROTHROMBIN TIME: CPT

## 2018-06-23 PROCEDURE — 96374 THER/PROPH/DIAG INJ IV PUSH: CPT

## 2018-06-23 PROCEDURE — 99285 EMERGENCY DEPT VISIT HI MDM: CPT

## 2018-06-23 PROCEDURE — 85025 COMPLETE CBC W/AUTO DIFF WBC: CPT

## 2018-06-23 PROCEDURE — 82947 ASSAY GLUCOSE BLOOD QUANT: CPT

## 2018-06-23 PROCEDURE — 2580000003 HC RX 258: Performed by: PHYSICIAN ASSISTANT

## 2018-06-23 PROCEDURE — G0480 DRUG TEST DEF 1-7 CLASSES: HCPCS

## 2018-06-23 PROCEDURE — 84484 ASSAY OF TROPONIN QUANT: CPT

## 2018-06-23 PROCEDURE — 2060000000 HC ICU INTERMEDIATE R&B

## 2018-06-23 PROCEDURE — 6360000002 HC RX W HCPCS: Performed by: PHYSICIAN ASSISTANT

## 2018-06-23 PROCEDURE — 73521 X-RAY EXAM HIPS BI 2 VIEWS: CPT

## 2018-06-23 PROCEDURE — 80048 BASIC METABOLIC PNL TOTAL CA: CPT

## 2018-06-23 PROCEDURE — 93005 ELECTROCARDIOGRAM TRACING: CPT

## 2018-06-23 PROCEDURE — 73030 X-RAY EXAM OF SHOULDER: CPT

## 2018-06-23 PROCEDURE — 6370000000 HC RX 637 (ALT 250 FOR IP)

## 2018-06-23 PROCEDURE — 96361 HYDRATE IV INFUSION ADD-ON: CPT

## 2018-06-23 PROCEDURE — 82010 KETONE BODYS QUAN: CPT

## 2018-06-23 RX ORDER — LORAZEPAM 1 MG/1
2 TABLET ORAL
Status: DISCONTINUED | OUTPATIENT
Start: 2018-06-23 | End: 2018-06-24

## 2018-06-23 RX ORDER — ASPIRIN 81 MG/1
81 TABLET ORAL DAILY
Status: DISCONTINUED | OUTPATIENT
Start: 2018-06-23 | End: 2018-06-26 | Stop reason: HOSPADM

## 2018-06-23 RX ORDER — LORAZEPAM 1 MG/1
4 TABLET ORAL
Status: DISCONTINUED | OUTPATIENT
Start: 2018-06-23 | End: 2018-06-23 | Stop reason: SDUPTHER

## 2018-06-23 RX ORDER — TRAZODONE HYDROCHLORIDE 50 MG/1
50 TABLET ORAL NIGHTLY PRN
Status: DISCONTINUED | OUTPATIENT
Start: 2018-06-23 | End: 2018-06-26 | Stop reason: HOSPADM

## 2018-06-23 RX ORDER — LORAZEPAM 1 MG/1
2 TABLET ORAL
Status: DISCONTINUED | OUTPATIENT
Start: 2018-06-23 | End: 2018-06-23 | Stop reason: SDUPTHER

## 2018-06-23 RX ORDER — LORAZEPAM 2 MG/ML
3 INJECTION INTRAMUSCULAR
Status: DISCONTINUED | OUTPATIENT
Start: 2018-06-23 | End: 2018-06-23 | Stop reason: SDUPTHER

## 2018-06-23 RX ORDER — ACETAMINOPHEN 500 MG
1000 TABLET ORAL ONCE
Status: COMPLETED | OUTPATIENT
Start: 2018-06-23 | End: 2018-06-23

## 2018-06-23 RX ORDER — DEXTROSE MONOHYDRATE 50 MG/ML
100 INJECTION, SOLUTION INTRAVENOUS PRN
Status: DISCONTINUED | OUTPATIENT
Start: 2018-06-23 | End: 2018-06-26 | Stop reason: HOSPADM

## 2018-06-23 RX ORDER — LORAZEPAM 2 MG/ML
2 INJECTION INTRAMUSCULAR
Status: DISCONTINUED | OUTPATIENT
Start: 2018-06-23 | End: 2018-06-24

## 2018-06-23 RX ORDER — DEXTROSE MONOHYDRATE 25 G/50ML
12.5 INJECTION, SOLUTION INTRAVENOUS PRN
Status: DISCONTINUED | OUTPATIENT
Start: 2018-06-23 | End: 2018-06-26 | Stop reason: HOSPADM

## 2018-06-23 RX ORDER — LORAZEPAM 1 MG/1
4 TABLET ORAL
Status: DISCONTINUED | OUTPATIENT
Start: 2018-06-23 | End: 2018-06-24

## 2018-06-23 RX ORDER — LORAZEPAM 2 MG/ML
4 INJECTION INTRAMUSCULAR
Status: DISCONTINUED | OUTPATIENT
Start: 2018-06-23 | End: 2018-06-24

## 2018-06-23 RX ORDER — NICOTINE 21 MG/24HR
1 PATCH, TRANSDERMAL 24 HOURS TRANSDERMAL DAILY
Status: DISCONTINUED | OUTPATIENT
Start: 2018-06-23 | End: 2018-06-26 | Stop reason: HOSPADM

## 2018-06-23 RX ORDER — PREGABALIN 75 MG/1
75 CAPSULE ORAL 2 TIMES DAILY
Status: DISCONTINUED | OUTPATIENT
Start: 2018-06-23 | End: 2018-06-26 | Stop reason: HOSPADM

## 2018-06-23 RX ORDER — LORAZEPAM 2 MG/ML
4 INJECTION INTRAMUSCULAR
Status: DISCONTINUED | OUTPATIENT
Start: 2018-06-23 | End: 2018-06-23 | Stop reason: SDUPTHER

## 2018-06-23 RX ORDER — SODIUM CHLORIDE 0.9 % (FLUSH) 0.9 %
10 SYRINGE (ML) INJECTION PRN
Status: DISCONTINUED | OUTPATIENT
Start: 2018-06-23 | End: 2018-06-23 | Stop reason: SDUPTHER

## 2018-06-23 RX ORDER — POTASSIUM CHLORIDE 20MEQ/15ML
40 LIQUID (ML) ORAL PRN
Status: DISCONTINUED | OUTPATIENT
Start: 2018-06-23 | End: 2018-06-26 | Stop reason: HOSPADM

## 2018-06-23 RX ORDER — NICOTINE POLACRILEX 4 MG
15 LOZENGE BUCCAL PRN
Status: DISCONTINUED | OUTPATIENT
Start: 2018-06-23 | End: 2018-06-26 | Stop reason: HOSPADM

## 2018-06-23 RX ORDER — ONDANSETRON 2 MG/ML
4 INJECTION INTRAMUSCULAR; INTRAVENOUS EVERY 6 HOURS PRN
Status: DISCONTINUED | OUTPATIENT
Start: 2018-06-23 | End: 2018-06-23 | Stop reason: ALTCHOICE

## 2018-06-23 RX ORDER — LORAZEPAM 1 MG/1
3 TABLET ORAL
Status: DISCONTINUED | OUTPATIENT
Start: 2018-06-23 | End: 2018-06-23 | Stop reason: SDUPTHER

## 2018-06-23 RX ORDER — SODIUM CHLORIDE 9 MG/ML
INJECTION, SOLUTION INTRAVENOUS CONTINUOUS
Status: DISCONTINUED | OUTPATIENT
Start: 2018-06-23 | End: 2018-06-23

## 2018-06-23 RX ORDER — MORPHINE SULFATE 2 MG/ML
2 INJECTION, SOLUTION INTRAMUSCULAR; INTRAVENOUS
Status: DISCONTINUED | OUTPATIENT
Start: 2018-06-23 | End: 2018-06-25

## 2018-06-23 RX ORDER — POTASSIUM CHLORIDE 20 MEQ/1
40 TABLET, EXTENDED RELEASE ORAL PRN
Status: DISCONTINUED | OUTPATIENT
Start: 2018-06-23 | End: 2018-06-26 | Stop reason: HOSPADM

## 2018-06-23 RX ORDER — LORAZEPAM 1 MG/1
3 TABLET ORAL
Status: DISCONTINUED | OUTPATIENT
Start: 2018-06-23 | End: 2018-06-24

## 2018-06-23 RX ORDER — LORAZEPAM 1 MG/1
1 TABLET ORAL
Status: DISCONTINUED | OUTPATIENT
Start: 2018-06-23 | End: 2018-06-23 | Stop reason: SDUPTHER

## 2018-06-23 RX ORDER — POTASSIUM CHLORIDE 7.45 MG/ML
10 INJECTION INTRAVENOUS PRN
Status: DISCONTINUED | OUTPATIENT
Start: 2018-06-23 | End: 2018-06-26 | Stop reason: HOSPADM

## 2018-06-23 RX ORDER — MORPHINE SULFATE 4 MG/ML
4 INJECTION, SOLUTION INTRAMUSCULAR; INTRAVENOUS EVERY 4 HOURS PRN
Status: DISCONTINUED | OUTPATIENT
Start: 2018-06-23 | End: 2018-06-25

## 2018-06-23 RX ORDER — LORAZEPAM 2 MG/ML
3 INJECTION INTRAMUSCULAR
Status: DISCONTINUED | OUTPATIENT
Start: 2018-06-23 | End: 2018-06-24

## 2018-06-23 RX ORDER — FLUTICASONE PROPIONATE 50 MCG
1 SPRAY, SUSPENSION (ML) NASAL DAILY PRN
Status: DISCONTINUED | OUTPATIENT
Start: 2018-06-23 | End: 2018-06-26 | Stop reason: HOSPADM

## 2018-06-23 RX ORDER — LORAZEPAM 2 MG/ML
1 INJECTION INTRAMUSCULAR
Status: DISCONTINUED | OUTPATIENT
Start: 2018-06-23 | End: 2018-06-23 | Stop reason: SDUPTHER

## 2018-06-23 RX ORDER — CLOPIDOGREL BISULFATE 75 MG/1
75 TABLET ORAL DAILY
Status: DISCONTINUED | OUTPATIENT
Start: 2018-06-23 | End: 2018-06-26 | Stop reason: HOSPADM

## 2018-06-23 RX ORDER — LISINOPRIL 10 MG/1
10 TABLET ORAL DAILY
Status: DISCONTINUED | OUTPATIENT
Start: 2018-06-23 | End: 2018-06-26 | Stop reason: HOSPADM

## 2018-06-23 RX ORDER — SODIUM CHLORIDE 0.9 % (FLUSH) 0.9 %
10 SYRINGE (ML) INJECTION EVERY 12 HOURS SCHEDULED
Status: DISCONTINUED | OUTPATIENT
Start: 2018-06-23 | End: 2018-06-23 | Stop reason: SDUPTHER

## 2018-06-23 RX ORDER — GUAIFENESIN 600 MG/1
1200 TABLET, EXTENDED RELEASE ORAL 2 TIMES DAILY PRN
Status: DISCONTINUED | OUTPATIENT
Start: 2018-06-23 | End: 2018-06-26 | Stop reason: HOSPADM

## 2018-06-23 RX ORDER — PANTOPRAZOLE SODIUM 40 MG/1
40 TABLET, DELAYED RELEASE ORAL
Status: DISCONTINUED | OUTPATIENT
Start: 2018-06-24 | End: 2018-06-26 | Stop reason: HOSPADM

## 2018-06-23 RX ORDER — 0.9 % SODIUM CHLORIDE 0.9 %
1000 INTRAVENOUS SOLUTION INTRAVENOUS ONCE
Status: COMPLETED | OUTPATIENT
Start: 2018-06-23 | End: 2018-06-23

## 2018-06-23 RX ORDER — LORAZEPAM 2 MG/ML
2 INJECTION INTRAMUSCULAR
Status: DISCONTINUED | OUTPATIENT
Start: 2018-06-23 | End: 2018-06-23 | Stop reason: SDUPTHER

## 2018-06-23 RX ORDER — AMLODIPINE BESYLATE 5 MG/1
5 TABLET ORAL DAILY
Status: DISCONTINUED | OUTPATIENT
Start: 2018-06-23 | End: 2018-06-26 | Stop reason: HOSPADM

## 2018-06-23 RX ORDER — LORAZEPAM 1 MG/1
1 TABLET ORAL
Status: DISCONTINUED | OUTPATIENT
Start: 2018-06-23 | End: 2018-06-24

## 2018-06-23 RX ORDER — NICOTINE 21 MG/24HR
1 PATCH, TRANSDERMAL 24 HOURS TRANSDERMAL DAILY
Status: DISCONTINUED | OUTPATIENT
Start: 2018-06-23 | End: 2018-06-24 | Stop reason: SDUPTHER

## 2018-06-23 RX ORDER — SODIUM CHLORIDE 0.9 % (FLUSH) 0.9 %
10 SYRINGE (ML) INJECTION PRN
Status: DISCONTINUED | OUTPATIENT
Start: 2018-06-23 | End: 2018-06-26 | Stop reason: HOSPADM

## 2018-06-23 RX ORDER — BUDESONIDE 0.5 MG/2ML
500 INHALANT ORAL 2 TIMES DAILY
Status: DISCONTINUED | OUTPATIENT
Start: 2018-06-23 | End: 2018-06-26 | Stop reason: HOSPADM

## 2018-06-23 RX ORDER — SODIUM CHLORIDE 0.9 % (FLUSH) 0.9 %
10 SYRINGE (ML) INJECTION EVERY 12 HOURS SCHEDULED
Status: DISCONTINUED | OUTPATIENT
Start: 2018-06-23 | End: 2018-06-26 | Stop reason: HOSPADM

## 2018-06-23 RX ORDER — KETOROLAC TROMETHAMINE 30 MG/ML
30 INJECTION, SOLUTION INTRAMUSCULAR; INTRAVENOUS ONCE
Status: COMPLETED | OUTPATIENT
Start: 2018-06-23 | End: 2018-06-23

## 2018-06-23 RX ORDER — TAMSULOSIN HYDROCHLORIDE 0.4 MG/1
0.4 CAPSULE ORAL DAILY
Status: DISCONTINUED | OUTPATIENT
Start: 2018-06-23 | End: 2018-06-26 | Stop reason: HOSPADM

## 2018-06-23 RX ORDER — LANOLIN ALCOHOL/MO/W.PET/CERES
325 CREAM (GRAM) TOPICAL 2 TIMES DAILY
Status: DISCONTINUED | OUTPATIENT
Start: 2018-06-23 | End: 2018-06-26 | Stop reason: HOSPADM

## 2018-06-23 RX ORDER — ONDANSETRON 4 MG/1
4 TABLET, ORALLY DISINTEGRATING ORAL EVERY 6 HOURS PRN
Status: DISCONTINUED | OUTPATIENT
Start: 2018-06-23 | End: 2018-06-26 | Stop reason: HOSPADM

## 2018-06-23 RX ORDER — LORAZEPAM 2 MG/ML
1 INJECTION INTRAMUSCULAR
Status: DISCONTINUED | OUTPATIENT
Start: 2018-06-23 | End: 2018-06-24

## 2018-06-23 RX ORDER — ONDANSETRON 2 MG/ML
4 INJECTION INTRAMUSCULAR; INTRAVENOUS EVERY 6 HOURS PRN
Status: DISCONTINUED | OUTPATIENT
Start: 2018-06-23 | End: 2018-06-26 | Stop reason: HOSPADM

## 2018-06-23 RX ADMIN — ACETAMINOPHEN 1000 MG: 500 TABLET ORAL at 17:53

## 2018-06-23 RX ADMIN — MORPHINE SULFATE 4 MG: 4 INJECTION INTRAVENOUS at 21:05

## 2018-06-23 RX ADMIN — PREGABALIN 75 MG: 75 CAPSULE ORAL at 22:29

## 2018-06-23 RX ADMIN — KETOROLAC TROMETHAMINE 30 MG: 30 INJECTION, SOLUTION INTRAMUSCULAR; INTRAVENOUS at 19:43

## 2018-06-23 RX ADMIN — INSULIN LISPRO 7 UNITS: 100 INJECTION, SOLUTION INTRAVENOUS; SUBCUTANEOUS at 22:29

## 2018-06-23 RX ADMIN — TRAZODONE HYDROCHLORIDE 50 MG: 50 TABLET ORAL at 22:29

## 2018-06-23 RX ADMIN — FOLIC ACID: 5 INJECTION, SOLUTION INTRAMUSCULAR; INTRAVENOUS; SUBCUTANEOUS at 19:57

## 2018-06-23 RX ADMIN — SODIUM CHLORIDE: 9 INJECTION, SOLUTION INTRAVENOUS at 21:05

## 2018-06-23 RX ADMIN — METOPROLOL TARTRATE 25 MG: 25 TABLET, FILM COATED ORAL at 22:31

## 2018-06-23 RX ADMIN — SODIUM CHLORIDE 1000 ML: 9 INJECTION, SOLUTION INTRAVENOUS at 19:50

## 2018-06-23 RX ADMIN — INSULIN HUMAN 5 UNITS: 100 INJECTION, SOLUTION PARENTERAL at 17:48

## 2018-06-23 RX ADMIN — FERROUS SULFATE TAB EC 325 MG (65 MG FE EQUIVALENT) 325 MG: 325 (65 FE) TABLET DELAYED RESPONSE at 22:29

## 2018-06-23 RX ADMIN — SODIUM CHLORIDE 1000 ML: 9 INJECTION, SOLUTION INTRAVENOUS at 17:48

## 2018-06-23 RX ADMIN — TAMSULOSIN HYDROCHLORIDE 0.4 MG: 0.4 CAPSULE ORAL at 22:29

## 2018-06-23 ASSESSMENT — PAIN SCALES - GENERAL
PAINLEVEL_OUTOF10: 7
PAINLEVEL_OUTOF10: 8
PAINLEVEL_OUTOF10: 9
PAINLEVEL_OUTOF10: 6
PAINLEVEL_OUTOF10: 8
PAINLEVEL_OUTOF10: 9
PAINLEVEL_OUTOF10: 7

## 2018-06-23 ASSESSMENT — PAIN DESCRIPTION - ORIENTATION: ORIENTATION: RIGHT

## 2018-06-23 ASSESSMENT — PAIN DESCRIPTION - LOCATION: LOCATION: ARM;LEG

## 2018-06-23 ASSESSMENT — PAIN DESCRIPTION - PROGRESSION: CLINICAL_PROGRESSION: NOT CHANGED

## 2018-06-23 ASSESSMENT — PAIN DESCRIPTION - PAIN TYPE: TYPE: ACUTE PAIN;CHRONIC PAIN;NEUROPATHIC PAIN

## 2018-06-24 PROBLEM — E13.10 SECONDARY DM WITH DKA, UNCONTROLLED (HCC): Status: ACTIVE | Noted: 2018-06-24

## 2018-06-24 PROBLEM — E87.20 METABOLIC ACIDOSIS WITH INCREASED ANION GAP AND ACCUMULATION OF ORGANIC ACIDS: Status: ACTIVE | Noted: 2018-06-24

## 2018-06-24 LAB
ALBUMIN SERPL-MCNC: 3.1 G/DL (ref 3.5–5.2)
ALBUMIN/GLOBULIN RATIO: ABNORMAL (ref 1–2.5)
ALP BLD-CCNC: 120 U/L (ref 40–129)
ALT SERPL-CCNC: 7 U/L (ref 5–41)
ANION GAP SERPL CALCULATED.3IONS-SCNC: 11 MMOL/L (ref 9–17)
ANION GAP SERPL CALCULATED.3IONS-SCNC: 9 MMOL/L (ref 9–17)
AST SERPL-CCNC: 8 U/L
BILIRUB SERPL-MCNC: 0.16 MG/DL (ref 0.3–1.2)
BUN BLDV-MCNC: 18 MG/DL (ref 8–23)
BUN BLDV-MCNC: 21 MG/DL (ref 8–23)
BUN/CREAT BLD: 15 (ref 9–20)
BUN/CREAT BLD: 21 (ref 9–20)
CALCIUM IONIZED: 1.28 MMOL/L (ref 1.13–1.33)
CALCIUM SERPL-MCNC: 7.9 MG/DL (ref 8.6–10.4)
CALCIUM SERPL-MCNC: 8.3 MG/DL (ref 8.6–10.4)
CHLORIDE BLD-SCNC: 101 MMOL/L (ref 98–107)
CHLORIDE BLD-SCNC: 95 MMOL/L (ref 98–107)
CHOLESTEROL/HDL RATIO: 3.6
CHOLESTEROL: 174 MG/DL
CO2: 24 MMOL/L (ref 20–31)
CO2: 25 MMOL/L (ref 20–31)
CREAT SERPL-MCNC: 0.87 MG/DL (ref 0.7–1.2)
CREAT SERPL-MCNC: 1.43 MG/DL (ref 0.7–1.2)
ESTIMATED AVERAGE GLUCOSE: 318 MG/DL
GFR AFRICAN AMERICAN: >60 ML/MIN
GFR AFRICAN AMERICAN: >60 ML/MIN
GFR NON-AFRICAN AMERICAN: 50 ML/MIN
GFR NON-AFRICAN AMERICAN: >60 ML/MIN
GFR SERPL CREATININE-BSD FRML MDRD: ABNORMAL ML/MIN/{1.73_M2}
GLUCOSE BLD-MCNC: 239 MG/DL (ref 75–110)
GLUCOSE BLD-MCNC: 247 MG/DL (ref 70–99)
GLUCOSE BLD-MCNC: 258 MG/DL (ref 75–110)
GLUCOSE BLD-MCNC: 292 MG/DL (ref 75–110)
GLUCOSE BLD-MCNC: 309 MG/DL (ref 75–110)
GLUCOSE BLD-MCNC: 321 MG/DL (ref 70–99)
HBA1C MFR BLD: 12.7 % (ref 4–6)
HCT VFR BLD CALC: 38.2 % (ref 41–53)
HDLC SERPL-MCNC: 49 MG/DL
HEMOGLOBIN: 12.3 G/DL (ref 13.5–17.5)
INR BLD: 1
LDL CHOLESTEROL: 90 MG/DL (ref 0–130)
MAGNESIUM: 2.1 MG/DL (ref 1.6–2.6)
MCH RBC QN AUTO: 27.9 PG (ref 26–34)
MCHC RBC AUTO-ENTMCNC: 32.1 G/DL (ref 31–37)
MCV RBC AUTO: 86.8 FL (ref 80–100)
NRBC AUTOMATED: ABNORMAL PER 100 WBC
PDW BLD-RTO: 16.6 % (ref 11.5–14.5)
PHOSPHORUS: 3.6 MG/DL (ref 2.5–4.5)
PLATELET # BLD: 387 K/UL (ref 130–400)
PMV BLD AUTO: 7.3 FL (ref 6–12)
POTASSIUM SERPL-SCNC: 4.5 MMOL/L (ref 3.7–5.3)
POTASSIUM SERPL-SCNC: 4.7 MMOL/L (ref 3.7–5.3)
PROTHROMBIN TIME: 9.9 SEC (ref 9.7–11.6)
RBC # BLD: 4.4 M/UL (ref 4.5–5.9)
SODIUM BLD-SCNC: 130 MMOL/L (ref 135–144)
SODIUM BLD-SCNC: 135 MMOL/L (ref 135–144)
TOTAL PROTEIN: 6.3 G/DL (ref 6.4–8.3)
TRIGL SERPL-MCNC: 175 MG/DL
TSH SERPL DL<=0.05 MIU/L-ACNC: 0.93 MIU/L (ref 0.3–5)
VLDLC SERPL CALC-MCNC: ABNORMAL MG/DL (ref 1–30)
WBC # BLD: 9.7 K/UL (ref 3.5–11)

## 2018-06-24 PROCEDURE — 6370000000 HC RX 637 (ALT 250 FOR IP): Performed by: NURSE PRACTITIONER

## 2018-06-24 PROCEDURE — 80053 COMPREHEN METABOLIC PANEL: CPT

## 2018-06-24 PROCEDURE — 83036 HEMOGLOBIN GLYCOSYLATED A1C: CPT

## 2018-06-24 PROCEDURE — 85027 COMPLETE CBC AUTOMATED: CPT

## 2018-06-24 PROCEDURE — 80061 LIPID PANEL: CPT

## 2018-06-24 PROCEDURE — 6370000000 HC RX 637 (ALT 250 FOR IP): Performed by: ORTHOPAEDIC SURGERY

## 2018-06-24 PROCEDURE — 6370000000 HC RX 637 (ALT 250 FOR IP)

## 2018-06-24 PROCEDURE — 80048 BASIC METABOLIC PNL TOTAL CA: CPT

## 2018-06-24 PROCEDURE — 94640 AIRWAY INHALATION TREATMENT: CPT

## 2018-06-24 PROCEDURE — 6360000002 HC RX W HCPCS: Performed by: NURSE PRACTITIONER

## 2018-06-24 PROCEDURE — 84100 ASSAY OF PHOSPHORUS: CPT

## 2018-06-24 PROCEDURE — 84443 ASSAY THYROID STIM HORMONE: CPT

## 2018-06-24 PROCEDURE — 2580000003 HC RX 258: Performed by: NURSE PRACTITIONER

## 2018-06-24 PROCEDURE — 36415 COLL VENOUS BLD VENIPUNCTURE: CPT

## 2018-06-24 PROCEDURE — 82330 ASSAY OF CALCIUM: CPT

## 2018-06-24 PROCEDURE — 85610 PROTHROMBIN TIME: CPT

## 2018-06-24 PROCEDURE — 82947 ASSAY GLUCOSE BLOOD QUANT: CPT

## 2018-06-24 PROCEDURE — 99223 1ST HOSP IP/OBS HIGH 75: CPT | Performed by: FAMILY MEDICINE

## 2018-06-24 PROCEDURE — 83735 ASSAY OF MAGNESIUM: CPT

## 2018-06-24 PROCEDURE — 6370000000 HC RX 637 (ALT 250 FOR IP): Performed by: FAMILY MEDICINE

## 2018-06-24 PROCEDURE — 2060000000 HC ICU INTERMEDIATE R&B

## 2018-06-24 RX ORDER — ATORVASTATIN CALCIUM 40 MG/1
40 TABLET, FILM COATED ORAL NIGHTLY
Status: DISCONTINUED | OUTPATIENT
Start: 2018-06-24 | End: 2018-06-26 | Stop reason: HOSPADM

## 2018-06-24 RX ORDER — OXYCODONE HYDROCHLORIDE AND ACETAMINOPHEN 5; 325 MG/1; MG/1
2 TABLET ORAL EVERY 4 HOURS PRN
Status: DISCONTINUED | OUTPATIENT
Start: 2018-06-24 | End: 2018-06-26 | Stop reason: HOSPADM

## 2018-06-24 RX ORDER — INSULIN GLARGINE 100 [IU]/ML
56 INJECTION, SOLUTION SUBCUTANEOUS NIGHTLY
Status: DISCONTINUED | OUTPATIENT
Start: 2018-06-24 | End: 2018-06-26 | Stop reason: HOSPADM

## 2018-06-24 RX ORDER — OXYCODONE HYDROCHLORIDE AND ACETAMINOPHEN 5; 325 MG/1; MG/1
1 TABLET ORAL EVERY 4 HOURS PRN
Status: DISCONTINUED | OUTPATIENT
Start: 2018-06-24 | End: 2018-06-26 | Stop reason: HOSPADM

## 2018-06-24 RX ORDER — CALCIUM CARBONATE 200(500)MG
500 TABLET,CHEWABLE ORAL 3 TIMES DAILY PRN
Status: DISCONTINUED | OUTPATIENT
Start: 2018-06-24 | End: 2018-06-26 | Stop reason: HOSPADM

## 2018-06-24 RX ADMIN — APIXABAN 5 MG: 5 TABLET, FILM COATED ORAL at 08:51

## 2018-06-24 RX ADMIN — OXYCODONE HYDROCHLORIDE AND ACETAMINOPHEN 2 TABLET: 5; 325 TABLET ORAL at 12:37

## 2018-06-24 RX ADMIN — FERROUS SULFATE TAB EC 325 MG (65 MG FE EQUIVALENT) 325 MG: 325 (65 FE) TABLET DELAYED RESPONSE at 08:52

## 2018-06-24 RX ADMIN — PANTOPRAZOLE SODIUM 40 MG: 40 TABLET, DELAYED RELEASE ORAL at 07:55

## 2018-06-24 RX ADMIN — MORPHINE SULFATE 4 MG: 4 INJECTION INTRAVENOUS at 09:34

## 2018-06-24 RX ADMIN — METOPROLOL TARTRATE 25 MG: 25 TABLET, FILM COATED ORAL at 22:33

## 2018-06-24 RX ADMIN — ANTACID TABLETS 500 MG: 500 TABLET, CHEWABLE ORAL at 22:59

## 2018-06-24 RX ADMIN — TRAZODONE HYDROCHLORIDE 50 MG: 50 TABLET ORAL at 22:33

## 2018-06-24 RX ADMIN — BUDESONIDE 500 MCG: 0.5 SUSPENSION RESPIRATORY (INHALATION) at 10:18

## 2018-06-24 RX ADMIN — MORPHINE SULFATE 4 MG: 4 INJECTION INTRAVENOUS at 14:01

## 2018-06-24 RX ADMIN — INSULIN LISPRO 5 UNITS: 100 INJECTION, SOLUTION INTRAVENOUS; SUBCUTANEOUS at 22:34

## 2018-06-24 RX ADMIN — CLOPIDOGREL BISULFATE 75 MG: 75 TABLET ORAL at 08:51

## 2018-06-24 RX ADMIN — BUDESONIDE 500 MCG: 0.5 SUSPENSION RESPIRATORY (INHALATION) at 20:11

## 2018-06-24 RX ADMIN — AMLODIPINE BESYLATE 5 MG: 5 TABLET ORAL at 08:52

## 2018-06-24 RX ADMIN — INSULIN GLARGINE 56 UNITS: 100 INJECTION, SOLUTION SUBCUTANEOUS at 22:34

## 2018-06-24 RX ADMIN — PREGABALIN 75 MG: 75 CAPSULE ORAL at 22:34

## 2018-06-24 RX ADMIN — INSULIN LISPRO 12 UNITS: 100 INJECTION, SOLUTION INTRAVENOUS; SUBCUTANEOUS at 17:54

## 2018-06-24 RX ADMIN — PREGABALIN 75 MG: 75 CAPSULE ORAL at 08:51

## 2018-06-24 RX ADMIN — ONDANSETRON 4 MG: 2 INJECTION INTRAMUSCULAR; INTRAVENOUS at 19:35

## 2018-06-24 RX ADMIN — MORPHINE SULFATE 4 MG: 4 INJECTION INTRAVENOUS at 18:11

## 2018-06-24 RX ADMIN — ATORVASTATIN CALCIUM 40 MG: 40 TABLET, FILM COATED ORAL at 22:34

## 2018-06-24 RX ADMIN — ANTACID TABLETS 500 MG: 500 TABLET, CHEWABLE ORAL at 00:57

## 2018-06-24 RX ADMIN — ASPIRIN 81 MG: 81 TABLET, COATED ORAL at 08:52

## 2018-06-24 RX ADMIN — FERROUS SULFATE TAB EC 325 MG (65 MG FE EQUIVALENT) 325 MG: 325 (65 FE) TABLET DELAYED RESPONSE at 22:34

## 2018-06-24 RX ADMIN — METOPROLOL TARTRATE 25 MG: 25 TABLET, FILM COATED ORAL at 08:51

## 2018-06-24 RX ADMIN — Medication 10 ML: at 08:51

## 2018-06-24 RX ADMIN — OXYCODONE HYDROCHLORIDE AND ACETAMINOPHEN 2 TABLET: 5; 325 TABLET ORAL at 16:55

## 2018-06-24 RX ADMIN — MORPHINE SULFATE 4 MG: 4 INJECTION INTRAVENOUS at 05:24

## 2018-06-24 RX ADMIN — ONDANSETRON 4 MG: 2 INJECTION INTRAMUSCULAR; INTRAVENOUS at 06:51

## 2018-06-24 RX ADMIN — LISINOPRIL 10 MG: 10 TABLET ORAL at 08:51

## 2018-06-24 RX ADMIN — TAMSULOSIN HYDROCHLORIDE 0.4 MG: 0.4 CAPSULE ORAL at 08:51

## 2018-06-24 RX ADMIN — MORPHINE SULFATE 4 MG: 4 INJECTION INTRAVENOUS at 00:57

## 2018-06-24 RX ADMIN — MORPHINE SULFATE 4 MG: 4 INJECTION INTRAVENOUS at 22:34

## 2018-06-24 RX ADMIN — INSULIN LISPRO 9 UNITS: 100 INJECTION, SOLUTION INTRAVENOUS; SUBCUTANEOUS at 12:37

## 2018-06-24 ASSESSMENT — ENCOUNTER SYMPTOMS
DIARRHEA: 0
VOMITING: 0
BLOOD IN STOOL: 0
ABDOMINAL PAIN: 0
COUGH: 0
CONSTIPATION: 0
VOICE CHANGE: 0
WHEEZING: 0
SINUS PRESSURE: 0
SORE THROAT: 0
NAUSEA: 0
SHORTNESS OF BREATH: 0

## 2018-06-24 ASSESSMENT — PAIN SCALES - GENERAL
PAINLEVEL_OUTOF10: 8
PAINLEVEL_OUTOF10: 8
PAINLEVEL_OUTOF10: 7
PAINLEVEL_OUTOF10: 8
PAINLEVEL_OUTOF10: 8
PAINLEVEL_OUTOF10: 7
PAINLEVEL_OUTOF10: 8
PAINLEVEL_OUTOF10: 6
PAINLEVEL_OUTOF10: 7

## 2018-06-25 ENCOUNTER — CARE COORDINATION (OUTPATIENT)
Dept: CARE COORDINATION | Age: 63
End: 2018-06-25

## 2018-06-25 ENCOUNTER — APPOINTMENT (OUTPATIENT)
Dept: GENERAL RADIOLOGY | Age: 63
DRG: 463 | End: 2018-06-25
Payer: MEDICARE

## 2018-06-25 LAB
ANION GAP SERPL CALCULATED.3IONS-SCNC: 11 MMOL/L (ref 9–17)
BUN BLDV-MCNC: 25 MG/DL (ref 8–23)
BUN/CREAT BLD: 26 (ref 9–20)
CALCIUM SERPL-MCNC: 8.1 MG/DL (ref 8.6–10.4)
CHLORIDE BLD-SCNC: 102 MMOL/L (ref 98–107)
CO2: 23 MMOL/L (ref 20–31)
CREAT SERPL-MCNC: 0.97 MG/DL (ref 0.7–1.2)
GFR AFRICAN AMERICAN: >60 ML/MIN
GFR NON-AFRICAN AMERICAN: >60 ML/MIN
GFR SERPL CREATININE-BSD FRML MDRD: ABNORMAL ML/MIN/{1.73_M2}
GFR SERPL CREATININE-BSD FRML MDRD: ABNORMAL ML/MIN/{1.73_M2}
GLUCOSE BLD-MCNC: 186 MG/DL (ref 75–110)
GLUCOSE BLD-MCNC: 205 MG/DL (ref 70–99)
GLUCOSE BLD-MCNC: 208 MG/DL (ref 75–110)
GLUCOSE BLD-MCNC: 241 MG/DL (ref 75–110)
GLUCOSE BLD-MCNC: 280 MG/DL (ref 75–110)
POTASSIUM SERPL-SCNC: 4.7 MMOL/L (ref 3.7–5.3)
SODIUM BLD-SCNC: 136 MMOL/L (ref 135–144)

## 2018-06-25 PROCEDURE — 73630 X-RAY EXAM OF FOOT: CPT

## 2018-06-25 PROCEDURE — 6360000002 HC RX W HCPCS: Performed by: NURSE PRACTITIONER

## 2018-06-25 PROCEDURE — 97116 GAIT TRAINING THERAPY: CPT

## 2018-06-25 PROCEDURE — G8979 MOBILITY GOAL STATUS: HCPCS

## 2018-06-25 PROCEDURE — 2580000003 HC RX 258: Performed by: NURSE PRACTITIONER

## 2018-06-25 PROCEDURE — 36415 COLL VENOUS BLD VENIPUNCTURE: CPT

## 2018-06-25 PROCEDURE — 97166 OT EVAL MOD COMPLEX 45 MIN: CPT

## 2018-06-25 PROCEDURE — 87076 CULTURE ANAEROBE IDENT EACH: CPT

## 2018-06-25 PROCEDURE — 6370000000 HC RX 637 (ALT 250 FOR IP): Performed by: NURSE PRACTITIONER

## 2018-06-25 PROCEDURE — G8988 SELF CARE GOAL STATUS: HCPCS

## 2018-06-25 PROCEDURE — 87186 SC STD MICRODIL/AGAR DIL: CPT

## 2018-06-25 PROCEDURE — 6370000000 HC RX 637 (ALT 250 FOR IP): Performed by: ORTHOPAEDIC SURGERY

## 2018-06-25 PROCEDURE — 97110 THERAPEUTIC EXERCISES: CPT

## 2018-06-25 PROCEDURE — 87185 SC STD ENZYME DETCJ PER NZM: CPT

## 2018-06-25 PROCEDURE — 94640 AIRWAY INHALATION TREATMENT: CPT

## 2018-06-25 PROCEDURE — 6370000000 HC RX 637 (ALT 250 FOR IP): Performed by: FAMILY MEDICINE

## 2018-06-25 PROCEDURE — 6370000000 HC RX 637 (ALT 250 FOR IP)

## 2018-06-25 PROCEDURE — 97535 SELF CARE MNGMENT TRAINING: CPT

## 2018-06-25 PROCEDURE — 2060000000 HC ICU INTERMEDIATE R&B

## 2018-06-25 PROCEDURE — 87070 CULTURE OTHR SPECIMN AEROBIC: CPT

## 2018-06-25 PROCEDURE — 0JBR0ZZ EXCISION OF LEFT FOOT SUBCUTANEOUS TISSUE AND FASCIA, OPEN APPROACH: ICD-10-PCS | Performed by: PODIATRIST

## 2018-06-25 PROCEDURE — G8978 MOBILITY CURRENT STATUS: HCPCS

## 2018-06-25 PROCEDURE — 86403 PARTICLE AGGLUT ANTBDY SCRN: CPT

## 2018-06-25 PROCEDURE — 99232 SBSQ HOSP IP/OBS MODERATE 35: CPT | Performed by: INTERNAL MEDICINE

## 2018-06-25 PROCEDURE — 87075 CULTR BACTERIA EXCEPT BLOOD: CPT

## 2018-06-25 PROCEDURE — G8987 SELF CARE CURRENT STATUS: HCPCS

## 2018-06-25 PROCEDURE — 97162 PT EVAL MOD COMPLEX 30 MIN: CPT

## 2018-06-25 PROCEDURE — 99211 OFF/OP EST MAY X REQ PHY/QHP: CPT

## 2018-06-25 PROCEDURE — 80048 BASIC METABOLIC PNL TOTAL CA: CPT

## 2018-06-25 PROCEDURE — 6360000002 HC RX W HCPCS: Performed by: INTERNAL MEDICINE

## 2018-06-25 PROCEDURE — 87205 SMEAR GRAM STAIN: CPT

## 2018-06-25 PROCEDURE — 82947 ASSAY GLUCOSE BLOOD QUANT: CPT

## 2018-06-25 RX ORDER — FAMOTIDINE 20 MG/1
20 TABLET, FILM COATED ORAL 2 TIMES DAILY
Status: DISCONTINUED | OUTPATIENT
Start: 2018-06-25 | End: 2018-06-26 | Stop reason: HOSPADM

## 2018-06-25 RX ADMIN — TRAZODONE HYDROCHLORIDE 50 MG: 50 TABLET ORAL at 22:11

## 2018-06-25 RX ADMIN — PANTOPRAZOLE SODIUM 40 MG: 40 TABLET, DELAYED RELEASE ORAL at 05:32

## 2018-06-25 RX ADMIN — PREGABALIN 75 MG: 75 CAPSULE ORAL at 09:39

## 2018-06-25 RX ADMIN — METOPROLOL TARTRATE 25 MG: 25 TABLET, FILM COATED ORAL at 09:38

## 2018-06-25 RX ADMIN — PREGABALIN 75 MG: 75 CAPSULE ORAL at 22:11

## 2018-06-25 RX ADMIN — OXYCODONE HYDROCHLORIDE AND ACETAMINOPHEN 2 TABLET: 5; 325 TABLET ORAL at 09:39

## 2018-06-25 RX ADMIN — OXYCODONE HYDROCHLORIDE AND ACETAMINOPHEN 2 TABLET: 5; 325 TABLET ORAL at 17:11

## 2018-06-25 RX ADMIN — GUAIFENESIN 1200 MG: 600 TABLET, EXTENDED RELEASE ORAL at 09:39

## 2018-06-25 RX ADMIN — MORPHINE SULFATE 4 MG: 4 INJECTION INTRAVENOUS at 05:45

## 2018-06-25 RX ADMIN — METOPROLOL TARTRATE 25 MG: 25 TABLET, FILM COATED ORAL at 22:08

## 2018-06-25 RX ADMIN — ONDANSETRON 4 MG: 2 INJECTION INTRAMUSCULAR; INTRAVENOUS at 05:34

## 2018-06-25 RX ADMIN — LISINOPRIL 10 MG: 10 TABLET ORAL at 09:38

## 2018-06-25 RX ADMIN — Medication 10 ML: at 22:11

## 2018-06-25 RX ADMIN — MORPHINE SULFATE 4 MG: 4 INJECTION INTRAVENOUS at 13:07

## 2018-06-25 RX ADMIN — TAMSULOSIN HYDROCHLORIDE 0.4 MG: 0.4 CAPSULE ORAL at 09:39

## 2018-06-25 RX ADMIN — FERROUS SULFATE TAB EC 325 MG (65 MG FE EQUIVALENT) 325 MG: 325 (65 FE) TABLET DELAYED RESPONSE at 22:11

## 2018-06-25 RX ADMIN — AMLODIPINE BESYLATE 5 MG: 5 TABLET ORAL at 09:39

## 2018-06-25 RX ADMIN — INSULIN LISPRO 5 UNITS: 100 INJECTION, SOLUTION INTRAVENOUS; SUBCUTANEOUS at 22:07

## 2018-06-25 RX ADMIN — HYDROMORPHONE HYDROCHLORIDE 0.25 MG: 1 INJECTION, SOLUTION INTRAMUSCULAR; INTRAVENOUS; SUBCUTANEOUS at 20:38

## 2018-06-25 RX ADMIN — BUDESONIDE 500 MCG: 0.5 SUSPENSION RESPIRATORY (INHALATION) at 19:49

## 2018-06-25 RX ADMIN — CLOPIDOGREL BISULFATE 75 MG: 75 TABLET ORAL at 09:38

## 2018-06-25 RX ADMIN — ASPIRIN 81 MG: 81 TABLET, COATED ORAL at 09:39

## 2018-06-25 RX ADMIN — FAMOTIDINE 20 MG: 20 TABLET ORAL at 22:07

## 2018-06-25 RX ADMIN — INSULIN GLARGINE 56 UNITS: 100 INJECTION, SOLUTION SUBCUTANEOUS at 22:07

## 2018-06-25 RX ADMIN — INSULIN LISPRO 6 UNITS: 100 INJECTION, SOLUTION INTRAVENOUS; SUBCUTANEOUS at 17:10

## 2018-06-25 RX ADMIN — ATORVASTATIN CALCIUM 40 MG: 40 TABLET, FILM COATED ORAL at 22:07

## 2018-06-25 RX ADMIN — INSULIN LISPRO 6 UNITS: 100 INJECTION, SOLUTION INTRAVENOUS; SUBCUTANEOUS at 13:05

## 2018-06-25 RX ADMIN — INSULIN LISPRO 3 UNITS: 100 INJECTION, SOLUTION INTRAVENOUS; SUBCUTANEOUS at 09:38

## 2018-06-25 RX ADMIN — FERROUS SULFATE TAB EC 325 MG (65 MG FE EQUIVALENT) 325 MG: 325 (65 FE) TABLET DELAYED RESPONSE at 09:38

## 2018-06-25 ASSESSMENT — PAIN SCALES - GENERAL
PAINLEVEL_OUTOF10: 8
PAINLEVEL_OUTOF10: 7
PAINLEVEL_OUTOF10: 8
PAINLEVEL_OUTOF10: 7
PAINLEVEL_OUTOF10: 7
PAINLEVEL_OUTOF10: 8
PAINLEVEL_OUTOF10: 8
PAINLEVEL_OUTOF10: 7

## 2018-06-25 ASSESSMENT — PAIN DESCRIPTION - PAIN TYPE
TYPE: ACUTE PAIN
TYPE: ACUTE PAIN;CHRONIC PAIN

## 2018-06-25 ASSESSMENT — ENCOUNTER SYMPTOMS
VOMITING: 0
NAUSEA: 0
SHORTNESS OF BREATH: 0

## 2018-06-25 ASSESSMENT — PAIN DESCRIPTION - ORIENTATION: ORIENTATION: RIGHT

## 2018-06-25 ASSESSMENT — PAIN DESCRIPTION - LOCATION
LOCATION: ARM
LOCATION: ARM

## 2018-06-26 VITALS
HEIGHT: 72 IN | SYSTOLIC BLOOD PRESSURE: 111 MMHG | OXYGEN SATURATION: 96 % | TEMPERATURE: 98.2 F | WEIGHT: 159.7 LBS | DIASTOLIC BLOOD PRESSURE: 62 MMHG | BODY MASS INDEX: 21.63 KG/M2 | HEART RATE: 80 BPM | RESPIRATION RATE: 16 BRPM

## 2018-06-26 LAB
C-REACTIVE PROTEIN: 58.5 MG/L (ref 0–5)
GLUCOSE BLD-MCNC: 106 MG/DL (ref 75–110)
GLUCOSE BLD-MCNC: 169 MG/DL (ref 75–110)
GLUCOSE BLD-MCNC: 221 MG/DL (ref 75–110)
SEDIMENTATION RATE, ERYTHROCYTE: 58 MM (ref 0–15)

## 2018-06-26 PROCEDURE — 6370000000 HC RX 637 (ALT 250 FOR IP): Performed by: NURSE PRACTITIONER

## 2018-06-26 PROCEDURE — 36415 COLL VENOUS BLD VENIPUNCTURE: CPT

## 2018-06-26 PROCEDURE — 6370000000 HC RX 637 (ALT 250 FOR IP): Performed by: FAMILY MEDICINE

## 2018-06-26 PROCEDURE — 99239 HOSP IP/OBS DSCHRG MGMT >30: CPT | Performed by: INTERNAL MEDICINE

## 2018-06-26 PROCEDURE — 6360000002 HC RX W HCPCS: Performed by: INTERNAL MEDICINE

## 2018-06-26 PROCEDURE — 2580000003 HC RX 258: Performed by: NURSE PRACTITIONER

## 2018-06-26 PROCEDURE — 82947 ASSAY GLUCOSE BLOOD QUANT: CPT

## 2018-06-26 PROCEDURE — 85651 RBC SED RATE NONAUTOMATED: CPT

## 2018-06-26 PROCEDURE — 94640 AIRWAY INHALATION TREATMENT: CPT

## 2018-06-26 PROCEDURE — 86140 C-REACTIVE PROTEIN: CPT

## 2018-06-26 PROCEDURE — 6370000000 HC RX 637 (ALT 250 FOR IP)

## 2018-06-26 PROCEDURE — 6370000000 HC RX 637 (ALT 250 FOR IP): Performed by: ORTHOPAEDIC SURGERY

## 2018-06-26 PROCEDURE — 6360000002 HC RX W HCPCS: Performed by: NURSE PRACTITIONER

## 2018-06-26 RX ORDER — INSULIN GLARGINE 100 [IU]/ML
76 INJECTION, SOLUTION SUBCUTANEOUS NIGHTLY
Status: ON HOLD | COMMUNITY
End: 2018-06-26 | Stop reason: ALTCHOICE

## 2018-06-26 RX ORDER — OXYCODONE HYDROCHLORIDE AND ACETAMINOPHEN 5; 325 MG/1; MG/1
TABLET ORAL
Qty: 30 TABLET | Refills: 0 | Status: SHIPPED | OUTPATIENT
Start: 2018-06-26 | End: 2018-07-03

## 2018-06-26 RX ORDER — CALCIUM CARBONATE 200(500)MG
500 TABLET,CHEWABLE ORAL 3 TIMES DAILY PRN
DISCHARGE
Start: 2018-06-26 | End: 2018-07-26

## 2018-06-26 RX ORDER — ATORVASTATIN CALCIUM 40 MG/1
40 TABLET, FILM COATED ORAL NIGHTLY
Qty: 30 TABLET | Refills: 3 | DISCHARGE
Start: 2018-06-26 | End: 2018-08-15 | Stop reason: ALTCHOICE

## 2018-06-26 RX ADMIN — ASPIRIN 81 MG: 81 TABLET, COATED ORAL at 08:55

## 2018-06-26 RX ADMIN — HYDROMORPHONE HYDROCHLORIDE 0.5 MG: 1 INJECTION, SOLUTION INTRAMUSCULAR; INTRAVENOUS; SUBCUTANEOUS at 07:05

## 2018-06-26 RX ADMIN — PREGABALIN 75 MG: 75 CAPSULE ORAL at 08:55

## 2018-06-26 RX ADMIN — METOPROLOL TARTRATE 25 MG: 25 TABLET, FILM COATED ORAL at 08:55

## 2018-06-26 RX ADMIN — OXYCODONE HYDROCHLORIDE AND ACETAMINOPHEN 2 TABLET: 5; 325 TABLET ORAL at 17:20

## 2018-06-26 RX ADMIN — BUDESONIDE 500 MCG: 0.5 SUSPENSION RESPIRATORY (INHALATION) at 08:04

## 2018-06-26 RX ADMIN — PANTOPRAZOLE SODIUM 40 MG: 40 TABLET, DELAYED RELEASE ORAL at 09:14

## 2018-06-26 RX ADMIN — INSULIN LISPRO 3 UNITS: 100 INJECTION, SOLUTION INTRAVENOUS; SUBCUTANEOUS at 17:20

## 2018-06-26 RX ADMIN — FAMOTIDINE 20 MG: 20 TABLET ORAL at 08:55

## 2018-06-26 RX ADMIN — TAMSULOSIN HYDROCHLORIDE 0.4 MG: 0.4 CAPSULE ORAL at 08:55

## 2018-06-26 RX ADMIN — AMLODIPINE BESYLATE 5 MG: 5 TABLET ORAL at 08:55

## 2018-06-26 RX ADMIN — INSULIN LISPRO 6 UNITS: 100 INJECTION, SOLUTION INTRAVENOUS; SUBCUTANEOUS at 12:24

## 2018-06-26 RX ADMIN — Medication 10 ML: at 08:56

## 2018-06-26 RX ADMIN — GUAIFENESIN 1200 MG: 600 TABLET, EXTENDED RELEASE ORAL at 08:55

## 2018-06-26 RX ADMIN — LISINOPRIL 10 MG: 10 TABLET ORAL at 08:55

## 2018-06-26 RX ADMIN — CLOPIDOGREL BISULFATE 75 MG: 75 TABLET ORAL at 08:55

## 2018-06-26 RX ADMIN — OXYCODONE HYDROCHLORIDE AND ACETAMINOPHEN 2 TABLET: 5; 325 TABLET ORAL at 08:55

## 2018-06-26 RX ADMIN — HYDROMORPHONE HYDROCHLORIDE 0.5 MG: 1 INJECTION, SOLUTION INTRAMUSCULAR; INTRAVENOUS; SUBCUTANEOUS at 12:25

## 2018-06-26 RX ADMIN — FERROUS SULFATE TAB EC 325 MG (65 MG FE EQUIVALENT) 325 MG: 325 (65 FE) TABLET DELAYED RESPONSE at 08:55

## 2018-06-26 ASSESSMENT — PAIN SCALES - GENERAL
PAINLEVEL_OUTOF10: 7
PAINLEVEL_OUTOF10: 8

## 2018-06-28 ENCOUNTER — CARE COORDINATION (OUTPATIENT)
Dept: CASE MANAGEMENT | Age: 63
End: 2018-06-28

## 2018-06-30 LAB
CULTURE: ABNORMAL
DIRECT EXAM: ABNORMAL
Lab: ABNORMAL
ORGANISM: ABNORMAL
SPECIMEN DESCRIPTION: ABNORMAL
STATUS: ABNORMAL

## 2018-07-09 ENCOUNTER — APPOINTMENT (OUTPATIENT)
Dept: GENERAL RADIOLOGY | Age: 63
DRG: 392 | End: 2018-07-09
Payer: MEDICARE

## 2018-07-09 ENCOUNTER — APPOINTMENT (OUTPATIENT)
Dept: CT IMAGING | Age: 63
DRG: 392 | End: 2018-07-09
Payer: MEDICARE

## 2018-07-09 ENCOUNTER — HOSPITAL ENCOUNTER (INPATIENT)
Age: 63
LOS: 2 days | Discharge: NURSING, REHAB OR LONG-TERM CARE | DRG: 392 | End: 2018-07-11
Attending: EMERGENCY MEDICINE | Admitting: INTERNAL MEDICINE
Payer: MEDICARE

## 2018-07-09 DIAGNOSIS — S42.211A FX HUMERAL NECK, RIGHT, CLOSED, INITIAL ENCOUNTER: Primary | ICD-10-CM

## 2018-07-09 PROBLEM — A41.9 SEPSIS (HCC): Status: ACTIVE | Noted: 2018-07-09

## 2018-07-09 LAB
-: ABNORMAL
ABSOLUTE EOS #: 0.4 K/UL (ref 0–0.4)
ABSOLUTE IMMATURE GRANULOCYTE: ABNORMAL K/UL (ref 0–0.3)
ABSOLUTE LYMPH #: 2.4 K/UL (ref 1–4.8)
ABSOLUTE MONO #: 0.5 K/UL (ref 0.2–0.8)
ALBUMIN SERPL-MCNC: 3.3 G/DL (ref 3.5–5.2)
ALBUMIN/GLOBULIN RATIO: ABNORMAL (ref 1–2.5)
ALP BLD-CCNC: 151 U/L (ref 40–129)
ALT SERPL-CCNC: 17 U/L (ref 5–41)
AMORPHOUS: ABNORMAL
AMYLASE: 647 U/L (ref 28–100)
ANION GAP SERPL CALCULATED.3IONS-SCNC: 19 MMOL/L
AST SERPL-CCNC: 5 U/L
BACTERIA: ABNORMAL
BASOPHILS # BLD: 1 % (ref 0–2)
BASOPHILS ABSOLUTE: 0.1 K/UL (ref 0–0.2)
BILIRUB SERPL-MCNC: 0.14 MG/DL (ref 0.3–1.2)
BILIRUBIN DIRECT: <0.08 MG/DL
BILIRUBIN URINE: NEGATIVE
BILIRUBIN, INDIRECT: ABNORMAL MG/DL (ref 0–1)
BUN BLDV-MCNC: 30 MG/DL (ref 8–23)
BUN/CREAT BLD: 22 (ref 9–20)
CALCIUM SERPL-MCNC: 9.3 MG/DL (ref 8.6–10.4)
CASTS UA: ABNORMAL /LPF
CHLORIDE BLD-SCNC: 99 MMOL/L (ref 98–107)
CO2: 20 MMOL/L (ref 20–31)
COLOR: YELLOW
COMMENT UA: ABNORMAL
CREAT SERPL-MCNC: 1.37 MG/DL (ref 0.7–1.2)
CRYSTALS, UA: ABNORMAL /HPF
DIFFERENTIAL TYPE: ABNORMAL
EOSINOPHILS RELATIVE PERCENT: 2 % (ref 1–4)
EPITHELIAL CELLS UA: ABNORMAL /HPF (ref 0–5)
GFR AFRICAN AMERICAN: >60 ML/MIN
GFR NON-AFRICAN AMERICAN: 53 ML/MIN
GFR SERPL CREATININE-BSD FRML MDRD: ABNORMAL ML/MIN/{1.73_M2}
GFR SERPL CREATININE-BSD FRML MDRD: ABNORMAL ML/MIN/{1.73_M2}
GLOBULIN: ABNORMAL G/DL
GLUCOSE BLD-MCNC: 294 MG/DL (ref 70–99)
GLUCOSE URINE: NEGATIVE
HCT VFR BLD CALC: 41.1 % (ref 41–53)
HEMOGLOBIN: 12.8 G/DL (ref 13.5–17.5)
IMMATURE GRANULOCYTES: ABNORMAL %
KETONES, URINE: NEGATIVE
LACTIC ACID: 1 MMOL/L (ref 0.5–2.2)
LACTIC ACID: 4.1 MMOL/L (ref 0.5–2.2)
LEUKOCYTE ESTERASE, URINE: NEGATIVE
LIPASE: 35 U/L (ref 13–60)
LYMPHOCYTES # BLD: 15 % (ref 24–44)
MCH RBC QN AUTO: 27.6 PG (ref 26–34)
MCHC RBC AUTO-ENTMCNC: 31.1 G/DL (ref 31–37)
MCV RBC AUTO: 88.7 FL (ref 80–100)
MONOCYTES # BLD: 3 % (ref 1–7)
MUCUS: ABNORMAL
NITRITE, URINE: NEGATIVE
NRBC AUTOMATED: ABNORMAL PER 100 WBC
OTHER OBSERVATIONS UA: ABNORMAL
PDW BLD-RTO: 16.5 % (ref 11.5–14.5)
PH UA: 5 (ref 5–8)
PLATELET # BLD: 644 K/UL (ref 130–400)
PLATELET ESTIMATE: ABNORMAL
PMV BLD AUTO: 7.2 FL (ref 6–12)
POTASSIUM SERPL-SCNC: 4.1 MMOL/L (ref 3.7–5.3)
PROTEIN UA: ABNORMAL
RBC # BLD: 4.63 M/UL (ref 4.5–5.9)
RBC # BLD: ABNORMAL 10*6/UL
RBC UA: ABNORMAL /HPF (ref 0–2)
RENAL EPITHELIAL, UA: ABNORMAL /HPF
SEG NEUTROPHILS: 79 % (ref 36–66)
SEGMENTED NEUTROPHILS ABSOLUTE COUNT: 12.4 K/UL (ref 1.8–7.7)
SODIUM BLD-SCNC: 138 MMOL/L (ref 135–144)
SPECIFIC GRAVITY UA: 1.01 (ref 1–1.03)
TOTAL PROTEIN: 7.3 G/DL (ref 6.4–8.3)
TRICHOMONAS: ABNORMAL
TURBIDITY: CLEAR
URINE HGB: NEGATIVE
UROBILINOGEN, URINE: NORMAL
WBC # BLD: 15.9 K/UL (ref 3.5–11)
WBC # BLD: ABNORMAL 10*3/UL
WBC UA: ABNORMAL /HPF (ref 0–5)
YEAST: ABNORMAL

## 2018-07-09 PROCEDURE — 2500000003 HC RX 250 WO HCPCS: Performed by: EMERGENCY MEDICINE

## 2018-07-09 PROCEDURE — 96375 TX/PRO/DX INJ NEW DRUG ADDON: CPT

## 2018-07-09 PROCEDURE — 83690 ASSAY OF LIPASE: CPT

## 2018-07-09 PROCEDURE — 99285 EMERGENCY DEPT VISIT HI MDM: CPT

## 2018-07-09 PROCEDURE — 81001 URINALYSIS AUTO W/SCOPE: CPT

## 2018-07-09 PROCEDURE — 2000000000 HC ICU R&B

## 2018-07-09 PROCEDURE — 2580000003 HC RX 258: Performed by: NURSE PRACTITIONER

## 2018-07-09 PROCEDURE — 85025 COMPLETE CBC W/AUTO DIFF WBC: CPT

## 2018-07-09 PROCEDURE — 82150 ASSAY OF AMYLASE: CPT

## 2018-07-09 PROCEDURE — C9113 INJ PANTOPRAZOLE SODIUM, VIA: HCPCS | Performed by: NURSE PRACTITIONER

## 2018-07-09 PROCEDURE — 87493 C DIFF AMPLIFIED PROBE: CPT

## 2018-07-09 PROCEDURE — 80076 HEPATIC FUNCTION PANEL: CPT

## 2018-07-09 PROCEDURE — 6360000002 HC RX W HCPCS: Performed by: NURSE PRACTITIONER

## 2018-07-09 PROCEDURE — 71045 X-RAY EXAM CHEST 1 VIEW: CPT

## 2018-07-09 PROCEDURE — 83605 ASSAY OF LACTIC ACID: CPT

## 2018-07-09 PROCEDURE — 2580000003 HC RX 258: Performed by: EMERGENCY MEDICINE

## 2018-07-09 PROCEDURE — 6360000004 HC RX CONTRAST MEDICATION: Performed by: EMERGENCY MEDICINE

## 2018-07-09 PROCEDURE — 6360000002 HC RX W HCPCS: Performed by: EMERGENCY MEDICINE

## 2018-07-09 PROCEDURE — 87040 BLOOD CULTURE FOR BACTERIA: CPT

## 2018-07-09 PROCEDURE — 87205 SMEAR GRAM STAIN: CPT

## 2018-07-09 PROCEDURE — 74177 CT ABD & PELVIS W/CONTRAST: CPT

## 2018-07-09 PROCEDURE — 96365 THER/PROPH/DIAG IV INF INIT: CPT

## 2018-07-09 PROCEDURE — 80048 BASIC METABOLIC PNL TOTAL CA: CPT

## 2018-07-09 PROCEDURE — 84484 ASSAY OF TROPONIN QUANT: CPT

## 2018-07-09 PROCEDURE — 87070 CULTURE OTHR SPECIMN AEROBIC: CPT

## 2018-07-09 RX ORDER — PANTOPRAZOLE SODIUM 40 MG/10ML
40 INJECTION, POWDER, LYOPHILIZED, FOR SOLUTION INTRAVENOUS ONCE
Status: COMPLETED | OUTPATIENT
Start: 2018-07-09 | End: 2018-07-09

## 2018-07-09 RX ORDER — 0.9 % SODIUM CHLORIDE 0.9 %
1000 INTRAVENOUS SOLUTION INTRAVENOUS ONCE
Status: COMPLETED | OUTPATIENT
Start: 2018-07-09 | End: 2018-07-10

## 2018-07-09 RX ORDER — LANOLIN ALCOHOL/MO/W.PET/CERES
325 CREAM (GRAM) TOPICAL 2 TIMES DAILY
Status: DISCONTINUED | OUTPATIENT
Start: 2018-07-09 | End: 2018-07-11 | Stop reason: HOSPADM

## 2018-07-09 RX ORDER — SODIUM CHLORIDE 0.9 % (FLUSH) 0.9 %
10 SYRINGE (ML) INJECTION PRN
Status: DISCONTINUED | OUTPATIENT
Start: 2018-07-09 | End: 2018-07-10 | Stop reason: SDUPTHER

## 2018-07-09 RX ORDER — CALCIUM CARBONATE 200(500)MG
500 TABLET,CHEWABLE ORAL 3 TIMES DAILY PRN
Status: DISCONTINUED | OUTPATIENT
Start: 2018-07-09 | End: 2018-07-11 | Stop reason: HOSPADM

## 2018-07-09 RX ORDER — ATORVASTATIN CALCIUM 40 MG/1
40 TABLET, FILM COATED ORAL NIGHTLY
Status: DISCONTINUED | OUTPATIENT
Start: 2018-07-09 | End: 2018-07-11 | Stop reason: HOSPADM

## 2018-07-09 RX ORDER — ALBUTEROL SULFATE 90 UG/1
2 AEROSOL, METERED RESPIRATORY (INHALATION) EVERY 6 HOURS PRN
Status: DISCONTINUED | OUTPATIENT
Start: 2018-07-09 | End: 2018-07-11 | Stop reason: HOSPADM

## 2018-07-09 RX ORDER — ONDANSETRON 2 MG/ML
8 INJECTION INTRAMUSCULAR; INTRAVENOUS ONCE
Status: COMPLETED | OUTPATIENT
Start: 2018-07-09 | End: 2018-07-09

## 2018-07-09 RX ORDER — FLUTICASONE PROPIONATE 50 MCG
1 SPRAY, SUSPENSION (ML) NASAL DAILY PRN
Status: DISCONTINUED | OUTPATIENT
Start: 2018-07-09 | End: 2018-07-11 | Stop reason: HOSPADM

## 2018-07-09 RX ORDER — LISINOPRIL 10 MG/1
10 TABLET ORAL DAILY
Status: DISCONTINUED | OUTPATIENT
Start: 2018-07-10 | End: 2018-07-11 | Stop reason: HOSPADM

## 2018-07-09 RX ORDER — SODIUM CHLORIDE 0.9 % (FLUSH) 0.9 %
10 SYRINGE (ML) INJECTION PRN
Status: DISCONTINUED | OUTPATIENT
Start: 2018-07-09 | End: 2018-07-11 | Stop reason: HOSPADM

## 2018-07-09 RX ORDER — 0.9 % SODIUM CHLORIDE 0.9 %
80 INTRAVENOUS SOLUTION INTRAVENOUS ONCE
Status: COMPLETED | OUTPATIENT
Start: 2018-07-09 | End: 2018-07-09

## 2018-07-09 RX ORDER — 0.9 % SODIUM CHLORIDE 0.9 %
1500 INTRAVENOUS SOLUTION INTRAVENOUS ONCE
Status: COMPLETED | OUTPATIENT
Start: 2018-07-09 | End: 2018-07-10

## 2018-07-09 RX ORDER — MORPHINE SULFATE 4 MG/ML
4 INJECTION, SOLUTION INTRAMUSCULAR; INTRAVENOUS ONCE
Status: COMPLETED | OUTPATIENT
Start: 2018-07-09 | End: 2018-07-09

## 2018-07-09 RX ORDER — TRAZODONE HYDROCHLORIDE 50 MG/1
50 TABLET ORAL NIGHTLY PRN
Status: DISCONTINUED | OUTPATIENT
Start: 2018-07-10 | End: 2018-07-10

## 2018-07-09 RX ORDER — CLOPIDOGREL BISULFATE 75 MG/1
75 TABLET ORAL DAILY
Status: DISCONTINUED | OUTPATIENT
Start: 2018-07-10 | End: 2018-07-11 | Stop reason: HOSPADM

## 2018-07-09 RX ORDER — 0.9 % SODIUM CHLORIDE 0.9 %
10 VIAL (ML) INJECTION ONCE
Status: COMPLETED | OUTPATIENT
Start: 2018-07-09 | End: 2018-07-09

## 2018-07-09 RX ORDER — ACETAMINOPHEN 325 MG/1
650 TABLET ORAL EVERY 4 HOURS PRN
Status: DISCONTINUED | OUTPATIENT
Start: 2018-07-09 | End: 2018-07-10 | Stop reason: SDUPTHER

## 2018-07-09 RX ORDER — ASPIRIN 81 MG/1
81 TABLET ORAL DAILY
Status: DISCONTINUED | OUTPATIENT
Start: 2018-07-10 | End: 2018-07-11 | Stop reason: HOSPADM

## 2018-07-09 RX ORDER — AMLODIPINE BESYLATE 5 MG/1
5 TABLET ORAL DAILY
Status: DISCONTINUED | OUTPATIENT
Start: 2018-07-10 | End: 2018-07-11 | Stop reason: HOSPADM

## 2018-07-09 RX ORDER — SODIUM CHLORIDE 0.9 % (FLUSH) 0.9 %
10 SYRINGE (ML) INJECTION EVERY 12 HOURS SCHEDULED
Status: DISCONTINUED | OUTPATIENT
Start: 2018-07-09 | End: 2018-07-11 | Stop reason: HOSPADM

## 2018-07-09 RX ORDER — IPRATROPIUM BROMIDE AND ALBUTEROL SULFATE 2.5; .5 MG/3ML; MG/3ML
1 SOLUTION RESPIRATORY (INHALATION) 3 TIMES DAILY
Status: DISCONTINUED | OUTPATIENT
Start: 2018-07-10 | End: 2018-07-10

## 2018-07-09 RX ORDER — PANTOPRAZOLE SODIUM 40 MG/1
40 TABLET, DELAYED RELEASE ORAL
Status: DISCONTINUED | OUTPATIENT
Start: 2018-07-10 | End: 2018-07-10

## 2018-07-09 RX ORDER — TAMSULOSIN HYDROCHLORIDE 0.4 MG/1
0.4 CAPSULE ORAL DAILY
Status: DISCONTINUED | OUTPATIENT
Start: 2018-07-10 | End: 2018-07-11 | Stop reason: HOSPADM

## 2018-07-09 RX ORDER — INSULIN GLARGINE 100 [IU]/ML
61 INJECTION, SOLUTION SUBCUTANEOUS NIGHTLY
Status: DISCONTINUED | OUTPATIENT
Start: 2018-07-09 | End: 2018-07-11 | Stop reason: HOSPADM

## 2018-07-09 RX ORDER — SENNA PLUS 8.6 MG/1
2 TABLET ORAL NIGHTLY
Status: DISCONTINUED | OUTPATIENT
Start: 2018-07-09 | End: 2018-07-11 | Stop reason: HOSPADM

## 2018-07-09 RX ORDER — BUDESONIDE 0.5 MG/2ML
500 INHALANT ORAL 2 TIMES DAILY
Status: DISCONTINUED | OUTPATIENT
Start: 2018-07-09 | End: 2018-07-10

## 2018-07-09 RX ORDER — PROMETHAZINE HYDROCHLORIDE 25 MG/ML
12.5 INJECTION, SOLUTION INTRAMUSCULAR; INTRAVENOUS ONCE
Status: DISCONTINUED | OUTPATIENT
Start: 2018-07-09 | End: 2018-07-11 | Stop reason: HOSPADM

## 2018-07-09 RX ORDER — IPRATROPIUM BROMIDE AND ALBUTEROL SULFATE 2.5; .5 MG/3ML; MG/3ML
1 SOLUTION RESPIRATORY (INHALATION) EVERY 6 HOURS PRN
Status: DISCONTINUED | OUTPATIENT
Start: 2018-07-09 | End: 2018-07-10

## 2018-07-09 RX ORDER — SODIUM CHLORIDE 9 MG/ML
INJECTION, SOLUTION INTRAVENOUS CONTINUOUS
Status: DISCONTINUED | OUTPATIENT
Start: 2018-07-09 | End: 2018-07-10

## 2018-07-09 RX ORDER — DEXTROSE MONOHYDRATE 50 MG/ML
100 INJECTION, SOLUTION INTRAVENOUS PRN
Status: DISCONTINUED | OUTPATIENT
Start: 2018-07-09 | End: 2018-07-11 | Stop reason: HOSPADM

## 2018-07-09 RX ORDER — DEXTROSE MONOHYDRATE 25 G/50ML
12.5 INJECTION, SOLUTION INTRAVENOUS PRN
Status: DISCONTINUED | OUTPATIENT
Start: 2018-07-09 | End: 2018-07-11 | Stop reason: HOSPADM

## 2018-07-09 RX ORDER — NICOTINE POLACRILEX 4 MG
15 LOZENGE BUCCAL PRN
Status: DISCONTINUED | OUTPATIENT
Start: 2018-07-09 | End: 2018-07-11 | Stop reason: HOSPADM

## 2018-07-09 RX ADMIN — VANCOMYCIN HYDROCHLORIDE 2000 MG: 1 INJECTION, POWDER, LYOPHILIZED, FOR SOLUTION INTRAVENOUS at 21:36

## 2018-07-09 RX ADMIN — TAZOBACTAM SODIUM AND PIPERACILLIN SODIUM 3.38 G: 375; 3 INJECTION, SOLUTION INTRAVENOUS at 21:04

## 2018-07-09 RX ADMIN — SODIUM CHLORIDE 80 ML: 9 INJECTION, SOLUTION INTRAVENOUS at 19:53

## 2018-07-09 RX ADMIN — SODIUM CHLORIDE 1000 ML: 9 INJECTION, SOLUTION INTRAVENOUS at 18:59

## 2018-07-09 RX ADMIN — MORPHINE SULFATE 4 MG: 4 INJECTION, SOLUTION INTRAMUSCULAR; INTRAVENOUS at 22:33

## 2018-07-09 RX ADMIN — ONDANSETRON 8 MG: 2 INJECTION INTRAMUSCULAR; INTRAVENOUS at 18:58

## 2018-07-09 RX ADMIN — IOPAMIDOL 75 ML: 755 INJECTION, SOLUTION INTRAVENOUS at 19:53

## 2018-07-09 RX ADMIN — PANTOPRAZOLE SODIUM 40 MG: 40 INJECTION, POWDER, FOR SOLUTION INTRAVENOUS at 20:46

## 2018-07-09 RX ADMIN — Medication 10 ML: at 19:53

## 2018-07-09 RX ADMIN — SODIUM CHLORIDE 1500 ML: 9 INJECTION, SOLUTION INTRAVENOUS at 19:30

## 2018-07-09 RX ADMIN — Medication 10 ML: at 20:46

## 2018-07-09 RX ADMIN — HYDROMORPHONE HYDROCHLORIDE 1 MG: 1 INJECTION, SOLUTION INTRAMUSCULAR; INTRAVENOUS; SUBCUTANEOUS at 18:58

## 2018-07-09 ASSESSMENT — PAIN DESCRIPTION - LOCATION
LOCATION: ARM;LEG
LOCATION: ABDOMEN;ARM

## 2018-07-09 ASSESSMENT — PAIN DESCRIPTION - ORIENTATION: ORIENTATION: RIGHT

## 2018-07-09 ASSESSMENT — ENCOUNTER SYMPTOMS
VOMITING: 1
DIARRHEA: 1
CONSTIPATION: 1
BACK PAIN: 0
NAUSEA: 1
ABDOMINAL PAIN: 1

## 2018-07-09 ASSESSMENT — PAIN DESCRIPTION - DESCRIPTORS: DESCRIPTORS: ACHING;SHARP

## 2018-07-09 ASSESSMENT — PAIN DESCRIPTION - PROGRESSION: CLINICAL_PROGRESSION: GRADUALLY WORSENING

## 2018-07-09 ASSESSMENT — PAIN SCALES - GENERAL
PAINLEVEL_OUTOF10: 6
PAINLEVEL_OUTOF10: 9
PAINLEVEL_OUTOF10: 7

## 2018-07-09 ASSESSMENT — PAIN DESCRIPTION - PAIN TYPE
TYPE: CHRONIC PAIN
TYPE: ACUTE PAIN

## 2018-07-09 ASSESSMENT — PAIN DESCRIPTION - FREQUENCY: FREQUENCY: CONTINUOUS

## 2018-07-09 NOTE — ED PROVIDER NOTES
Probable hiatal hernia. CT ABDOMEN PELVIS W IV CONTRAST   Final Result   1. No acute infective or inflammatory process. 2. Small right pleural effusion and left basal bandlike pleural-parenchymal   densities. .  Findings could be residual of prior pneumonitis. 3. Bilateral nonobstructing nephrolithiasis and renal cysts. Some renal   hypodensities are too small to characterize. 4. Redemonstration of a shallow epigastric ventral hernia.                ED BEDSIDE ULTRASOUND:   Performed by ED Physician - none    LABS:  Labs Reviewed   CBC WITH AUTO DIFFERENTIAL - Abnormal; Notable for the following:        Result Value    WBC 15.9 (*)     Hemoglobin 12.8 (*)     RDW 16.5 (*)     Platelets 457 (*)     Seg Neutrophils 79 (*)     Lymphocytes 15 (*)     Segs Absolute 12.40 (*)     All other components within normal limits   BASIC METABOLIC PANEL - Abnormal; Notable for the following:     Glucose 294 (*)     BUN 30 (*)     CREATININE 1.37 (*)     Bun/Cre Ratio 22 (*)     GFR Non- 53 (*)     All other components within normal limits   HEPATIC FUNCTION PANEL - Abnormal; Notable for the following:     Alb 3.3 (*)     Alkaline Phosphatase 151 (*)     Total Bilirubin 0.14 (*)     All other components within normal limits   AMYLASE - Abnormal; Notable for the following:     Amylase 647 (*)     All other components within normal limits   LACTIC ACID - Abnormal; Notable for the following:     Lactic Acid 4.1 (*)     All other components within normal limits   URINE RT REFLEX TO CULTURE - Abnormal; Notable for the following:     Protein, UA 1+ (*)     All other components within normal limits   MICROSCOPIC URINALYSIS - Abnormal; Notable for the following:     Bacteria, UA FEW (*)     Mucus, UA 1+ (*)     All other components within normal limits   AMYLASE - Abnormal; Notable for the following:     Amylase 159 (*)     All other components within normal limits   LIPASE - Abnormal; Notable for the following:     Lipase 12 (*)     All other components within normal limits   POC GLUCOSE FINGERSTICK - Abnormal; Notable for the following:     POC Glucose 189 (*)     All other components within normal limits   WOUND CULTURE   C DIFF TOXIN B BY RT PCR   CULTURE BLOOD #1   CULTURE BLOOD #1   GRAM STAIN   RESPIRATORY CULTURE   LIPASE   LACTIC ACID   LACTATE, SEPSIS   TROPONIN   COMPREHENSIVE METABOLIC PANEL W/ REFLEX TO MG FOR LOW K   CBC   PROTIME-INR   CALCIUM, IONIZED   MAGNESIUM   PHOSPHORUS   CK   TROPONIN   POCT GLUCOSE   POCT GLUCOSE       All other labs were within normal range or not returned as of this dictation. EMERGENCY DEPARTMENT COURSE and DIFFERENTIAL DIAGNOSIS/MDM:   Vitals:    Vitals:    07/10/18 0405 07/10/18 0445 07/10/18 0500 07/10/18 0530   BP: (!) 103/52 (!) 122/59 (!) 98/49 (!) 142/67   Pulse: 81 82 73 80   Resp: 18 15 17 16   Temp: 98.6 °F (37 °C)      TempSrc: Temporal      SpO2: 94% 95% 94% 93%   Weight:       Height:           Medical Decision Makin-year-old male who appears ill. He has diffuse abdominal tenderness. Septic protocol is ordered. Lactic acid is 4.1, WBC 13.9. Amylase 647. He also has some mild abnormalities in his LFTs. CT scan does not have any significant abnormalities. In addition the IV fluids he received pain medication and antiemetics. Care was turned over to Dr. Malachi Ormond at the end of my shift for additional results, orders, diagnosis and disposition.     CONSULTS:  IP CONSULT TO HOSPITALIST  PHARMACY TO DOSE VANCOMYCIN    PROCEDURES:  None    FINAL IMPRESSION    Sepsis    DISPOSITION/PLAN   DISPOSITION        PATIENT REFERRED TO:   DO Rasheeda Morgan [address] [place] [address]          Frances Gill MD  [address] [place] [address]  [phone]            DISCHARGE MEDICATIONS:     Current Discharge Medication List            (Please note that portions of this note were completed with a voice recognition program.  Efforts were made to edit the dictations but occasionally words are mis-transcribed.)    MD Kelli Pinzon, MARCELO - CNP  07/09/18 [id number]       Heath Benitez MD  07/10/18 6719

## 2018-07-09 NOTE — ED NOTES
Bed: 18  Expected date: 7/9/18  Expected time: 5:49 PM  Means of arrival:   Comments:  Daisy.  Neda Byers RN  07/09/18 5900

## 2018-07-10 ENCOUNTER — APPOINTMENT (OUTPATIENT)
Dept: GENERAL RADIOLOGY | Age: 63
DRG: 392 | End: 2018-07-10
Payer: MEDICARE

## 2018-07-10 PROBLEM — R55 SYNCOPE AND COLLAPSE: Status: RESOLVED | Noted: 2018-01-19 | Resolved: 2018-07-10

## 2018-07-10 PROBLEM — B36.9 FUNGAL DERMATITIS: Status: RESOLVED | Noted: 2017-12-27 | Resolved: 2018-07-10

## 2018-07-10 PROBLEM — T40.2X5A CONSTIPATION DUE TO OPIOID THERAPY: Status: ACTIVE | Noted: 2018-07-10

## 2018-07-10 PROBLEM — R79.89 ELEVATED LACTIC ACID LEVEL: Status: ACTIVE | Noted: 2018-07-10

## 2018-07-10 PROBLEM — R55 SYNCOPE: Status: RESOLVED | Noted: 2018-01-19 | Resolved: 2018-07-10

## 2018-07-10 PROBLEM — N17.9 ACUTE KIDNEY INJURY (HCC): Status: ACTIVE | Noted: 2018-07-10

## 2018-07-10 PROBLEM — I95.9 TRANSIENT HYPOTENSION: Status: ACTIVE | Noted: 2018-07-09

## 2018-07-10 PROBLEM — E87.20 METABOLIC ACIDOSIS WITH INCREASED ANION GAP AND ACCUMULATION OF ORGANIC ACIDS: Status: RESOLVED | Noted: 2018-06-24 | Resolved: 2018-07-10

## 2018-07-10 PROBLEM — E13.10 SECONDARY DM WITH DKA, UNCONTROLLED (HCC): Status: RESOLVED | Noted: 2018-06-24 | Resolved: 2018-07-10

## 2018-07-10 PROBLEM — J15.9 BACTERIAL PNEUMONIA: Status: RESOLVED | Noted: 2017-12-26 | Resolved: 2018-07-10

## 2018-07-10 PROBLEM — K59.03 CONSTIPATION DUE TO OPIOID THERAPY: Status: ACTIVE | Noted: 2018-07-10

## 2018-07-10 LAB
ALBUMIN SERPL-MCNC: 3 G/DL (ref 3.5–5.2)
ALBUMIN/GLOBULIN RATIO: ABNORMAL (ref 1–2.5)
ALP BLD-CCNC: 100 U/L (ref 40–129)
ALT SERPL-CCNC: 14 U/L (ref 5–41)
AMYLASE: 159 U/L (ref 28–100)
ANION GAP SERPL CALCULATED.3IONS-SCNC: 15 MMOL/L (ref 9–17)
AST SERPL-CCNC: 11 U/L
BILIRUB SERPL-MCNC: 0.11 MG/DL (ref 0.3–1.2)
BUN BLDV-MCNC: 24 MG/DL (ref 8–23)
BUN/CREAT BLD: 29 (ref 9–20)
CALCIUM IONIZED: 1.33 MMOL/L (ref 1.13–1.33)
CALCIUM SERPL-MCNC: 8.5 MG/DL (ref 8.6–10.4)
CHLORIDE BLD-SCNC: 105 MMOL/L (ref 98–107)
CO2: 21 MMOL/L (ref 20–31)
CREAT SERPL-MCNC: 0.83 MG/DL (ref 0.7–1.2)
GFR AFRICAN AMERICAN: >60 ML/MIN
GFR NON-AFRICAN AMERICAN: >60 ML/MIN
GFR SERPL CREATININE-BSD FRML MDRD: ABNORMAL ML/MIN/{1.73_M2}
GFR SERPL CREATININE-BSD FRML MDRD: ABNORMAL ML/MIN/{1.73_M2}
GLUCOSE BLD-MCNC: 113 MG/DL (ref 70–99)
GLUCOSE BLD-MCNC: 170 MG/DL (ref 75–110)
GLUCOSE BLD-MCNC: 189 MG/DL (ref 75–110)
GLUCOSE BLD-MCNC: 66 MG/DL (ref 75–110)
HCT VFR BLD CALC: 34 % (ref 41–53)
HEMOGLOBIN: 10.7 G/DL (ref 13.5–17.5)
HEMOGLOBIN: 11.2 G/DL (ref 13.5–17.5)
INR BLD: 1
LACTIC ACID, SEPSIS WHOLE BLOOD: NORMAL MMOL/L (ref 0.5–1.9)
LACTIC ACID, SEPSIS: 0.7 MMOL/L (ref 0.5–1.9)
LIPASE: 12 U/L (ref 13–60)
LV EF: 55 %
LVEF MODALITY: NORMAL
MAGNESIUM: 1.9 MG/DL (ref 1.6–2.6)
MCH RBC QN AUTO: 27.8 PG (ref 26–34)
MCHC RBC AUTO-ENTMCNC: 31.5 G/DL (ref 31–37)
MCV RBC AUTO: 88.3 FL (ref 80–100)
NRBC AUTOMATED: ABNORMAL PER 100 WBC
PDW BLD-RTO: 16.4 % (ref 11.5–14.5)
PHOSPHORUS: 3.3 MG/DL (ref 2.5–4.5)
PLATELET # BLD: 576 K/UL (ref 130–400)
PMV BLD AUTO: 7.1 FL (ref 6–12)
POTASSIUM SERPL-SCNC: 3.9 MMOL/L (ref 3.7–5.3)
PROTHROMBIN TIME: 10.3 SEC (ref 9.7–11.6)
RBC # BLD: 3.85 M/UL (ref 4.5–5.9)
SODIUM BLD-SCNC: 141 MMOL/L (ref 135–144)
TOTAL CK: 81 U/L (ref 39–308)
TOTAL PROTEIN: 6.3 G/DL (ref 6.4–8.3)
TROPONIN INTERP: NORMAL
TROPONIN INTERP: NORMAL
TROPONIN T: <0.03 NG/ML
TROPONIN T: <0.03 NG/ML
WBC # BLD: 15.5 K/UL (ref 3.5–11)

## 2018-07-10 PROCEDURE — 6360000002 HC RX W HCPCS: Performed by: INTERNAL MEDICINE

## 2018-07-10 PROCEDURE — 97530 THERAPEUTIC ACTIVITIES: CPT

## 2018-07-10 PROCEDURE — 2580000003 HC RX 258: Performed by: NURSE PRACTITIONER

## 2018-07-10 PROCEDURE — 97166 OT EVAL MOD COMPLEX 45 MIN: CPT

## 2018-07-10 PROCEDURE — 97535 SELF CARE MNGMENT TRAINING: CPT

## 2018-07-10 PROCEDURE — 74018 RADEX ABDOMEN 1 VIEW: CPT

## 2018-07-10 PROCEDURE — 80053 COMPREHEN METABOLIC PANEL: CPT

## 2018-07-10 PROCEDURE — 73630 X-RAY EXAM OF FOOT: CPT

## 2018-07-10 PROCEDURE — 85027 COMPLETE CBC AUTOMATED: CPT

## 2018-07-10 PROCEDURE — 94640 AIRWAY INHALATION TREATMENT: CPT

## 2018-07-10 PROCEDURE — 84484 ASSAY OF TROPONIN QUANT: CPT

## 2018-07-10 PROCEDURE — 83735 ASSAY OF MAGNESIUM: CPT

## 2018-07-10 PROCEDURE — 97163 PT EVAL HIGH COMPLEX 45 MIN: CPT

## 2018-07-10 PROCEDURE — 82330 ASSAY OF CALCIUM: CPT

## 2018-07-10 PROCEDURE — 82947 ASSAY GLUCOSE BLOOD QUANT: CPT

## 2018-07-10 PROCEDURE — 99221 1ST HOSP IP/OBS SF/LOW 40: CPT | Performed by: INTERNAL MEDICINE

## 2018-07-10 PROCEDURE — 85610 PROTHROMBIN TIME: CPT

## 2018-07-10 PROCEDURE — G8979 MOBILITY GOAL STATUS: HCPCS

## 2018-07-10 PROCEDURE — 93306 TTE W/DOPPLER COMPLETE: CPT

## 2018-07-10 PROCEDURE — 36415 COLL VENOUS BLD VENIPUNCTURE: CPT

## 2018-07-10 PROCEDURE — 84100 ASSAY OF PHOSPHORUS: CPT

## 2018-07-10 PROCEDURE — 2500000003 HC RX 250 WO HCPCS: Performed by: NURSE PRACTITIONER

## 2018-07-10 PROCEDURE — 99222 1ST HOSP IP/OBS MODERATE 55: CPT | Performed by: INTERNAL MEDICINE

## 2018-07-10 PROCEDURE — G8987 SELF CARE CURRENT STATUS: HCPCS

## 2018-07-10 PROCEDURE — 6370000000 HC RX 637 (ALT 250 FOR IP): Performed by: NURSE PRACTITIONER

## 2018-07-10 PROCEDURE — 6360000002 HC RX W HCPCS: Performed by: NURSE PRACTITIONER

## 2018-07-10 PROCEDURE — 94760 N-INVAS EAR/PLS OXIMETRY 1: CPT

## 2018-07-10 PROCEDURE — 6370000000 HC RX 637 (ALT 250 FOR IP): Performed by: INTERNAL MEDICINE

## 2018-07-10 PROCEDURE — 85018 HEMOGLOBIN: CPT

## 2018-07-10 PROCEDURE — G8988 SELF CARE GOAL STATUS: HCPCS

## 2018-07-10 PROCEDURE — G8978 MOBILITY CURRENT STATUS: HCPCS

## 2018-07-10 PROCEDURE — 1200000000 HC SEMI PRIVATE

## 2018-07-10 PROCEDURE — 82550 ASSAY OF CK (CPK): CPT

## 2018-07-10 RX ORDER — BUDESONIDE 0.5 MG/2ML
0.5 INHALANT ORAL 2 TIMES DAILY
Status: DISCONTINUED | OUTPATIENT
Start: 2018-07-10 | End: 2018-07-11 | Stop reason: HOSPADM

## 2018-07-10 RX ORDER — DOXYCYCLINE HYCLATE 100 MG/1
100 CAPSULE ORAL 2 TIMES DAILY
Status: ON HOLD | COMMUNITY
End: 2018-07-10

## 2018-07-10 RX ORDER — LANOLIN ALCOHOL/MO/W.PET/CERES
3 CREAM (GRAM) TOPICAL EVERY EVENING
Status: DISCONTINUED | OUTPATIENT
Start: 2018-07-10 | End: 2018-07-11 | Stop reason: HOSPADM

## 2018-07-10 RX ORDER — PANTOPRAZOLE SODIUM 40 MG/1
40 TABLET, DELAYED RELEASE ORAL
Status: DISCONTINUED | OUTPATIENT
Start: 2018-07-11 | End: 2018-07-11 | Stop reason: HOSPADM

## 2018-07-10 RX ORDER — ONDANSETRON 2 MG/ML
4 INJECTION INTRAMUSCULAR; INTRAVENOUS EVERY 6 HOURS PRN
Status: DISCONTINUED | OUTPATIENT
Start: 2018-07-10 | End: 2018-07-10 | Stop reason: ALTCHOICE

## 2018-07-10 RX ORDER — ONDANSETRON 4 MG/1
4 TABLET, ORALLY DISINTEGRATING ORAL EVERY 6 HOURS PRN
Status: DISCONTINUED | OUTPATIENT
Start: 2018-07-10 | End: 2018-07-11 | Stop reason: HOSPADM

## 2018-07-10 RX ORDER — PANTOPRAZOLE SODIUM 40 MG/1
40 TABLET, DELAYED RELEASE ORAL 2 TIMES DAILY
COMMUNITY
End: 2018-08-15 | Stop reason: ALTCHOICE

## 2018-07-10 RX ORDER — OXYCODONE HYDROCHLORIDE AND ACETAMINOPHEN 5; 325 MG/1; MG/1
2 TABLET ORAL EVERY 4 HOURS PRN
Status: ON HOLD | COMMUNITY
End: 2018-07-10 | Stop reason: HOSPADM

## 2018-07-10 RX ORDER — OXYCODONE HYDROCHLORIDE AND ACETAMINOPHEN 5; 325 MG/1; MG/1
2 TABLET ORAL EVERY 4 HOURS PRN
Status: DISCONTINUED | OUTPATIENT
Start: 2018-07-10 | End: 2018-07-11 | Stop reason: HOSPADM

## 2018-07-10 RX ORDER — ALBUTEROL SULFATE 2.5 MG/3ML
2.5 SOLUTION RESPIRATORY (INHALATION) 3 TIMES DAILY
Status: DISCONTINUED | OUTPATIENT
Start: 2018-07-10 | End: 2018-07-11 | Stop reason: HOSPADM

## 2018-07-10 RX ORDER — TRAMADOL HYDROCHLORIDE 50 MG/1
50 TABLET ORAL EVERY 6 HOURS PRN
Status: DISCONTINUED | OUTPATIENT
Start: 2018-07-10 | End: 2018-07-11 | Stop reason: HOSPADM

## 2018-07-10 RX ORDER — PANTOPRAZOLE SODIUM 40 MG/1
40 TABLET, DELAYED RELEASE ORAL 2 TIMES DAILY
Status: CANCELLED | OUTPATIENT
Start: 2018-07-10

## 2018-07-10 RX ORDER — 0.9 % SODIUM CHLORIDE 0.9 %
500 INTRAVENOUS SOLUTION INTRAVENOUS ONCE
Status: COMPLETED | OUTPATIENT
Start: 2018-07-10 | End: 2018-07-10

## 2018-07-10 RX ORDER — ALBUTEROL SULFATE 2.5 MG/3ML
2.5 SOLUTION RESPIRATORY (INHALATION) EVERY 6 HOURS PRN
Status: DISCONTINUED | OUTPATIENT
Start: 2018-07-10 | End: 2018-07-11 | Stop reason: HOSPADM

## 2018-07-10 RX ORDER — ACETAMINOPHEN 500 MG
1000 TABLET ORAL EVERY 6 HOURS PRN
Status: DISCONTINUED | OUTPATIENT
Start: 2018-07-10 | End: 2018-07-11 | Stop reason: HOSPADM

## 2018-07-10 RX ORDER — INSULIN DETEMIR 100 [IU]/ML
10 INJECTION, SOLUTION SUBCUTANEOUS DAILY
COMMUNITY
End: 2018-08-15 | Stop reason: ALTCHOICE

## 2018-07-10 RX ORDER — LANOLIN ALCOHOL/MO/W.PET/CERES
3 CREAM (GRAM) TOPICAL EVERY EVENING
COMMUNITY
End: 2018-08-15 | Stop reason: ALTCHOICE

## 2018-07-10 RX ORDER — ONDANSETRON 2 MG/ML
4 INJECTION INTRAMUSCULAR; INTRAVENOUS EVERY 6 HOURS PRN
Status: DISCONTINUED | OUTPATIENT
Start: 2018-07-10 | End: 2018-07-11 | Stop reason: HOSPADM

## 2018-07-10 RX ORDER — OXYCODONE HYDROCHLORIDE AND ACETAMINOPHEN 5; 325 MG/1; MG/1
2 TABLET ORAL EVERY 4 HOURS PRN
Refills: 0 | Status: SHIPPED | OUTPATIENT
Start: 2018-07-10 | End: 2018-07-17

## 2018-07-10 RX ADMIN — Medication 10 ML: at 20:34

## 2018-07-10 RX ADMIN — ATORVASTATIN CALCIUM 40 MG: 40 TABLET, FILM COATED ORAL at 20:33

## 2018-07-10 RX ADMIN — ASPIRIN 81 MG: 81 TABLET, COATED ORAL at 09:20

## 2018-07-10 RX ADMIN — INSULIN GLARGINE 61 UNITS: 100 INJECTION, SOLUTION SUBCUTANEOUS at 01:16

## 2018-07-10 RX ADMIN — ONDANSETRON 4 MG: 2 INJECTION INTRAMUSCULAR; INTRAVENOUS at 20:15

## 2018-07-10 RX ADMIN — SODIUM CHLORIDE 500 ML: 9 INJECTION, SOLUTION INTRAVENOUS at 01:19

## 2018-07-10 RX ADMIN — PANTOPRAZOLE SODIUM 40 MG: 40 TABLET, DELAYED RELEASE ORAL at 09:29

## 2018-07-10 RX ADMIN — TAMSULOSIN HYDROCHLORIDE 0.4 MG: 0.4 CAPSULE ORAL at 09:19

## 2018-07-10 RX ADMIN — HYDROMORPHONE HYDROCHLORIDE 0.5 MG: 1 INJECTION, SOLUTION INTRAMUSCULAR; INTRAVENOUS; SUBCUTANEOUS at 12:24

## 2018-07-10 RX ADMIN — FERROUS SULFATE TAB EC 325 MG (65 MG FE EQUIVALENT) 325 MG: 325 (65 FE) TABLET DELAYED RESPONSE at 20:33

## 2018-07-10 RX ADMIN — ANTACID TABLETS 500 MG: 500 TABLET, CHEWABLE ORAL at 02:45

## 2018-07-10 RX ADMIN — PREGABALIN 75 MG: 50 CAPSULE ORAL at 20:32

## 2018-07-10 RX ADMIN — TAZOBACTAM SODIUM AND PIPERACILLIN SODIUM 3.38 G: 375; 3 INJECTION, SOLUTION INTRAVENOUS at 05:14

## 2018-07-10 RX ADMIN — TRAMADOL HYDROCHLORIDE 50 MG: 50 TABLET, FILM COATED ORAL at 01:16

## 2018-07-10 RX ADMIN — APIXABAN 5 MG: 5 TABLET, FILM COATED ORAL at 09:20

## 2018-07-10 RX ADMIN — APIXABAN 5 MG: 5 TABLET, FILM COATED ORAL at 20:33

## 2018-07-10 RX ADMIN — PREGABALIN 75 MG: 50 CAPSULE ORAL at 09:19

## 2018-07-10 RX ADMIN — HYDROMORPHONE HYDROCHLORIDE 0.5 MG: 1 INJECTION, SOLUTION INTRAMUSCULAR; INTRAVENOUS; SUBCUTANEOUS at 20:14

## 2018-07-10 RX ADMIN — Medication 10 ML: at 02:27

## 2018-07-10 RX ADMIN — FERROUS SULFATE TAB EC 325 MG (65 MG FE EQUIVALENT) 325 MG: 325 (65 FE) TABLET DELAYED RESPONSE at 09:19

## 2018-07-10 RX ADMIN — MELATONIN TAB 3 MG 3 MG: 3 TAB at 20:33

## 2018-07-10 RX ADMIN — SODIUM CHLORIDE: 9 INJECTION, SOLUTION INTRAVENOUS at 02:18

## 2018-07-10 RX ADMIN — METOPROLOL TARTRATE 25 MG: 25 TABLET ORAL at 09:19

## 2018-07-10 RX ADMIN — INSULIN GLARGINE 61 UNITS: 100 INJECTION, SOLUTION SUBCUTANEOUS at 20:35

## 2018-07-10 RX ADMIN — HYDROMORPHONE HYDROCHLORIDE 0.5 MG: 1 INJECTION, SOLUTION INTRAMUSCULAR; INTRAVENOUS; SUBCUTANEOUS at 09:17

## 2018-07-10 RX ADMIN — ONDANSETRON 4 MG: 2 INJECTION INTRAMUSCULAR; INTRAVENOUS at 03:09

## 2018-07-10 RX ADMIN — CLOPIDOGREL BISULFATE 75 MG: 75 TABLET ORAL at 09:20

## 2018-07-10 RX ADMIN — HYDROMORPHONE HYDROCHLORIDE 0.5 MG: 1 INJECTION, SOLUTION INTRAMUSCULAR; INTRAVENOUS; SUBCUTANEOUS at 23:38

## 2018-07-10 RX ADMIN — AMLODIPINE BESYLATE 5 MG: 5 TABLET ORAL at 09:20

## 2018-07-10 RX ADMIN — METOPROLOL TARTRATE 25 MG: 25 TABLET ORAL at 20:33

## 2018-07-10 RX ADMIN — ALBUTEROL SULFATE 2.5 MG: 2.5 SOLUTION RESPIRATORY (INHALATION) at 19:37

## 2018-07-10 ASSESSMENT — PAIN SCALES - GENERAL
PAINLEVEL_OUTOF10: 5
PAINLEVEL_OUTOF10: 8
PAINLEVEL_OUTOF10: 6
PAINLEVEL_OUTOF10: 6
PAINLEVEL_OUTOF10: 8
PAINLEVEL_OUTOF10: 7
PAINLEVEL_OUTOF10: 6
PAINLEVEL_OUTOF10: 7
PAINLEVEL_OUTOF10: 7

## 2018-07-10 ASSESSMENT — ENCOUNTER SYMPTOMS
NAUSEA: 1
ABDOMINAL PAIN: 1
DIARRHEA: 0
VOMITING: 0

## 2018-07-10 ASSESSMENT — PAIN DESCRIPTION - PAIN TYPE: TYPE: ACUTE PAIN

## 2018-07-10 ASSESSMENT — PAIN DESCRIPTION - ORIENTATION: ORIENTATION: RIGHT

## 2018-07-10 ASSESSMENT — PAIN DESCRIPTION - LOCATION: LOCATION: ABDOMEN;ARM

## 2018-07-10 NOTE — DISCHARGE SUMMARY
Component Value Date    WBC 15.5 07/10/2018    RBC 3.85 07/10/2018    RBC 4.32 05/02/2012    HGB 10.7 07/10/2018    HCT 34.0 07/10/2018    MCV 88.3 07/10/2018    MCH 27.8 07/10/2018    MCHC 31.5 07/10/2018    RDW 16.4 07/10/2018     07/10/2018     05/02/2012     BMP:    Lab Results   Component Value Date    GLUCOSE 113 07/10/2018    GLUCOSE 129 05/02/2012     07/10/2018    K 3.9 07/10/2018     07/10/2018    CO2 21 07/10/2018    ANIONGAP 15 07/10/2018    BUN 24 07/10/2018    CREATININE 0.83 07/10/2018    BUNCRER 29 07/10/2018    CALCIUM 8.5 07/10/2018    LABGLOM >60 07/10/2018    GFRAA >60 07/10/2018    GFR      07/10/2018    GFR NOT REPORTED 07/10/2018         Radiology:  Xr Chest Standard (2 Vw)    Result Date: 6/23/2018  EXAMINATION: TWO VIEWS OF THE CHEST 6/23/2018 4:39 pm COMPARISON: January 19, 2018 HISTORY: ORDERING SYSTEM PROVIDED HISTORY: fall TECHNOLOGIST PROVIDED HISTORY: Reason for exam:->fall Ordering Physician Provided Reason for Exam: chest pain Acuity: Acute Type of Exam: Initial Mechanism of Injury: fell Relevant Medical/Surgical History: COPD FINDINGS: Subsegmental atelectasis left base. Small bilateral pleural effusions unchanged. Thickening of the minor fissure. Moderate hiatal hernia. Cardiac silhouette mediastinal shadow unremarkable. Lungs are otherwise clear. Bony thorax intact. Hardware left shoulder. Small residual bilateral pleural effusions or pleural reactions. Moderate hiatal hernia. No acute disease. Xr Shoulder Right (min 2 Views)    Result Date: 6/23/2018  EXAMINATION: 3 XRAY VIEWS OF THE RIGHT SHOULDER 6/23/2018 5:12 pm COMPARISON: None. HISTORY: ORDERING SYSTEM PROVIDED HISTORY: Pain TECHNOLOGIST PROVIDED HISTORY: Reason for exam:->Pain Ordering Physician Provided Reason for Exam: Rt shoulder pain and limited ROM Acuity: Acute Type of Exam: Initial Mechanism of Injury: fell FINDINGS: Comminuted impacted nondisplaced fracture humeral neck. low as reasonably achievable. COMPARISON: 07/01/2014, 08/16/2012 HISTORY: ORDERING SYSTEM PROVIDED HISTORY: pain TECHNOLOGIST PROVIDED HISTORY: IV Only Contrast FINDINGS: Lower Chest: Small right pleural effusion and thick bandlike pleural-parenchymal left lower lobe densities. Organs: The liver, spleen, pancreas show no significant abnormalities. Unchanged 1.5 cm right adrenal adenoma dating back to August 2012. Bilateral scattered nonobstructing renal calculi largest about 5 mm. Also noted are several circumscribed renal hypodensities representing cysts right greater than left. Some are too small to characterize. Gallbladder unremarkable. GI/Bowel: There is limited evaluation due to absence of oral contrast.  Large partially imaged hiatal hernia. Small bowel shows no obstruction. No acute process in the colon. Normal appendix. Pelvis: Urinary bladder grossly normal.  No suspicious pelvic mass. Peritoneum/Retroperitoneum: No free intraperitoneal fluid or significant lymphadenopathy. Moderate atherosclerotic disease evident. Bones/Soft Tissues: Diffuse degenerative changes. Superficial soft tissues unremarkable. Note is made of diastases of the recti in the epigastric region with a shallow ventral hernia     1. No acute infective or inflammatory process. 2. Small right pleural effusion and left basal bandlike pleural-parenchymal densities. .  Findings could be residual of prior pneumonitis. 3. Bilateral nonobstructing nephrolithiasis and renal cysts. Some renal hypodensities are too small to characterize. 4. Redemonstration of a shallow epigastric ventral hernia.      Xr Chest 1 Vw    Result Date: 7/9/2018  EXAMINATION: SINGLE XRAY VIEW OF THE CHEST 7/9/2018 8:41 pm COMPARISON: Chest 06/23/2018 HISTORY: ORDERING SYSTEM PROVIDED HISTORY: Leukocytosis TECHNOLOGIST PROVIDED HISTORY: Reason for exam:->Leukocytosis Ordering Physician Provided Reason for Exam: leukocytosis/N/V Acuity: Unknown Type of Exam: Unknown Relevant Medical/Surgical History: COPD  Diabetes FINDINGS: The cardiac silhouette is enlarged. Calcifications involving the aorta reflect atherosclerosis. The mediastinal and hilar silhouettes appear unremarkable. Rounded opacity retrocardiac region is again seen typical of hiatal hernia. Vascular engorgement and cephalization is demonstrated with bilateral peribronchial cuffing and perivascular haziness. Mild blunting right and left costophrenic sulci. Thickening right minor fissure typical of subpulmonic effusion. No consolidation is evident. No pneumothorax is seen. No acute osseous abnormality is identified. 1. Pulmonary findings typical of congestive heart failure. 2. Calcific atherosclerosis aorta. 3. Cardiomegaly. 4. Probable hiatal hernia. Consultations:    Consults:     Final Specialist Recommendations/Findings:   IP CONSULT TO HOSPITALIST  PHARMACY TO DOSE VANCOMYCIN  IP CONSULT TO PODIATRY  IP CONSULT TO INFECTIOUS DISEASES      The patient was seen and examined on day of discharge and this discharge summary is in conjunction with any daily progress note from day of discharge. Discharge plan:     Disposition: Skilled Facility    Physician Follow Up:   DO Rasheeda Bautista [address] [place] [address]          Phillis Opitz, MD  [place] [place] [address]  [phone]             Requiring Further Evaluation/Follow Up POST HOSPITALIZATION/Incidental Findings: None    Diet: diabetic diet    Activity: As tolerated    Instructions to Patient: Take medications as prescribed    Discharge Medications:      Medication List      CHANGE how you take these medications    oxyCODONE-acetaminophen 5-325 MG per tablet  Commonly known as:  PERCOCET  Take 2 tablets by mouth every 4 hours as needed for Pain for up to 7 days. .  What changed:  reasons to take this        CONTINUE taking these medications    albuterol sulfate  (90 Base) MCG/ACT inhaler  Commonly known as:  VENTOLIN HFA  INHALE 2 PUFFS INTO THE LUNGS EVERY 6 HOURS AS NEEDED FOR WHEEZING     amLODIPine 5 MG tablet  Commonly known as:  NORVASC  Take 1 tablet by mouth daily Hold if SBP <100     aspirin EC 81 MG EC tablet  Take 1 tablet by mouth daily     atorvastatin 40 MG tablet  Commonly known as:  LIPITOR  Take 1 tablet by mouth nightly     * budesonide 0.5 MG/2ML nebulizer suspension  Commonly known as:  PULMICORT  Take 2 mLs by nebulization 2 times daily     * budesonide 180 MCG/ACT Aepb inhaler  Commonly known as:  PULMICORT  Inhale 2 puffs into the lungs 2 times daily     calcium carbonate 500 MG chewable tablet  Commonly known as:  TUMS  Take 1 tablet by mouth 3 times daily as needed for Heartburn     clopidogrel 75 MG tablet  Commonly known as:  PLAVIX  Take 1 tablet by mouth daily     Compressor/Nebulizer Misc  1 kit by Does not apply route 4 times daily Use QID     ELIQUIS 5 MG Tabs tablet  Generic drug:  apixaban     ferrous sulfate 325 (65 Fe) MG EC tablet     fluticasone 50 MCG/ACT nasal spray  Commonly known as:  FLONASE  1 spray by Nasal route daily as needed for Allergies (during allergy season)     guaiFENesin 600 MG extended release tablet  Commonly known as:  MUCINEX  Take 2 tablets by mouth 2 times daily as needed for Congestion     INCRUSE ELLIPTA 62.5 MCG/INH Aepb  Generic drug:  Umeclidinium Bromide  INHALE 1 PUFF INTO THE LUNGS EVERY DAY     insulin glargine 300 UNIT/ML injection pen  Commonly known as:  TOUJEO SOLOSTAR     * insulin lispro 100 UNIT/ML injection vial  Commonly known as:  HUMALOG  Inject 0-18 Units into the skin 3 times daily (with meals)     * insulin lispro 100 UNIT/ML injection vial  Commonly known as:  HUMALOG  Inject 0-9 Units into the skin nightly     Insulin Pen Needle 32G X 4 MM Misc  1 each by Does not apply route daily     ipratropium-albuterol 0.5-2.5 (3) MG/3ML Soln nebulizer solution  Commonly known as:  DUONEB  Inhale 3 mLs into the lungs every 6 hours as needed for Other (cough and congestion)     LEVEMIR FLEXTOUCH 100 UNIT/ML injection pen  Generic drug:  insulin detemir     lisinopril 10 MG tablet  Commonly known as:  PRINIVIL;ZESTRIL     melatonin 3 MG Tabs tablet     metoprolol tartrate 25 MG tablet  Commonly known as:  LOPRESSOR  TAKE 1 TABLET BY MOUTH EVERY 12 HOURS     pantoprazole 40 MG tablet  Commonly known as:  PROTONIX     pregabalin 75 MG capsule  Commonly known as:  LYRICA  Take 1 capsule by mouth 2 times daily for 30 days. senna 8.6 MG tablet  Commonly known as:  SENOKOT  Take 2 tablets by mouth nightly as needed for Constipation     tamsulosin 0.4 MG capsule  Commonly known as:  FLOMAX  Take 1 capsule by mouth daily     TRUEPLUS LANCETS 30G Misc  USE AS DIRECTED        * This list has 4 medication(s) that are the same as other medications prescribed for you. Read the directions carefully, and ask your doctor or other care provider to review them with you. STOP taking these medications    doxycycline hyclate 100 MG capsule  Commonly known as:  VIBRAMYCIN     esomeprazole 40 MG delayed release capsule  Commonly known as:  NEXIUM     insulin aspart 100 UNIT/ML injection pen  Commonly known as:  Yanely Quynh     traZODone 50 MG tablet  Commonly known as:  DESYREL           Where to Get Your Medications      You can get these medications from any pharmacy    Bring a paper prescription for each of these medications  · oxyCODONE-acetaminophen 5-325 MG per tablet         Time Spent on discharge is  27 minutes in patient examination, evaluation, counseling as well as medication reconciliation, prescriptions for required medications, discharge plan and follow up. Electronically signed by   Ashwiin Mcfarland DO  7/10/2018  4:23 PM      Thank you Dr. Kaushal Spann DO for the opportunity to be involved in this patient's care.

## 2018-07-10 NOTE — FLOWSHEET NOTE
Patient sitting up in bed. He engages in brief conversaiotn with writer, sharing that he has been admitted after \"being at rehab a while and being constipated. \" He expresses no needs. Writer offers supportive coonversation and listening presence, and patient expresses thanks. Spiritual Care will follow as needed.      07/10/18 1142   Encounter Summary   Services provided to: Patient   Referral/Consult From: Krunal   Continue Visiting (7/10/18)   Complexity of Encounter Low   Length of Encounter 15 minutes   Spiritual Assessment Completed Yes   Routine   Type Initial   Assessment Approachable   Intervention Active listening   Outcome Engaged in conversation;Expressed gratitude
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
## 2018-07-10 NOTE — PLAN OF CARE
Problem: Risk for Impaired Skin Integrity  Goal: Tissue integrity - skin and mucous membranes  Structural intactness and normal physiological function of skin and  mucous membranes. Outcome: Ongoing  Patient has healing ulcers on right leg and left foot. Patient has scabs on left leg. Continue to monitor. Intervention: TURN PATIENT  Patient able to turn self. Continue to monitor. Problem: Falls - Risk of:  Goal: Will remain free from falls  Will remain free from falls   Outcome: Ongoing  Patient free from falls. Patient education provide. Call light within reach. Area free of hazards. Adequate lighting. Goal: Absence of physical injury  Absence of physical injury   Outcome: Ongoing  Patient free from physical injury. Continue to monitor. Problem: Pain:  Goal: Pain level will decrease  Pain level will decrease   Outcome: Ongoing  Patient reporting pain. Patient receiving oral medication for pain.     Goal: Control of acute pain  Control of acute pain   Outcome: Ongoing    Goal: Control of chronic pain  Control of chronic pain   Outcome: Ongoing

## 2018-07-10 NOTE — PROGRESS NOTES
Limits  Hearing: Within functional limits     Subjective  General  Chart Reviewed: Yes  Patient assessed for rehabilitation services?: Yes  Family / Caregiver Present: No  Follows Commands: Within Functional Limits  General Comment  Comments: Pt. without a sling Rt. UMONET (humeral fx.) Got pt. sling from [facility] and assisted in donning. Pt. does not like sling fastened around neck in bed. Subjective  Subjective: States he has had his prosthetic LE for one year and is in process of getting some adjustments made by  due to rubbing on knee causing sores  Pain Screening  Patient Currently in Pain: Yes          Orientation  Orientation  Overall Orientation Status: Impaired (not appearing to comprehend certain instruction from therapists; impulsive)    Social/Functional History  Social/Functional History  Lives With: Alone (pt typically lives in a house, independent at home. Pt plans on going back to SNF following d/c to hospital)  Type of Home: House  Home Access: Stairs to enter with rails  Entrance Stairs - Number of Steps: 2  Bathroom Toilet: Handicap height  Home Equipment: Rolling walker (R BKA/ has prosthetic)  ADL Assistance: [address] [place] [address]: Independent  Homemaking Responsibilities: Yes  Ambulation Assistance: Independent  Transfer Assistance: Independent  Additional Comments: pt has been at a SNF for last 3 weeks since humerus fx. Pt has had frequent falls; dec balance with prosthetic leg  Objective     Observation/Palpation  Posture: Good  Observation: acts impulsively; likes to talk and feels he knows more than staff; confused/not grasping instruction from therapists    AROM RLE (degrees)  RLE AROM: WFL  RLE General AROM: Rt. BKA  AROM LLE (degrees)  LLE AROM : WFL  AROM RUE (degrees)  RUE AROM : WFL  RUE General AROM: elbow/wrist/hand;  no ROM shoulder due to humerus fx.     AROM LUE (degrees)  LUE AROM : WFL     Tone RLE  RLE Tone: Normotonic  Tone LLE  LLE Tone:

## 2018-07-10 NOTE — PLAN OF CARE
Problem: Nutrition  Goal: Optimal nutrition therapy  Nutrition Problem: Increased nutrient needs  Intervention: Food and/or Nutrient Delivery: Continue current diet, Start ONS  Nutritional Goals: PO intakes to meet >75% of estimated kcal/protein needs to promote wound healing    Outcome: Ongoing

## 2018-07-10 NOTE — H&P
[facility]    HISTORY AND PHYSICAL EXAMINATION            Date:   7/10/2018  Patient name:  Farzaneh Nguyen  Date of admission:  7/9/2018  6:36 PM  MRN:   [id number]  Account:  [de-identified]  YOB: 1955  PCP:    Genevieve Mahmood DO  Room:   [facility]/1102-01  Code Status:    Full Code    Chief Complaint:     Chief Complaint   Patient presents with    Abdominal Pain    Diarrhea    Emesis    Nausea       History Obtained From:     patient    History of Present Illness: The patient is a 63 y.o. Non-/non  male who presents with Abdominal Pain; Diarrhea; Emesis; and Nausea   and he is admitted to the hospital for the management of  Abdominal pain. This is a 53-year-old white male who was recently treated released after a humerus fracture. He's been rehabbing at an extended care facility over the last 4 days he's had constipation with increased abdominal distention and pain. This pain has been cramping and stabbing at times. Last evening he had severe pain and reports that the stepson is calling city call 911. He is transported here and upon arrival had a massive BM in the emergency room with subsequent diarrhea. Had improvement in his abdominal pain since that time and resolution of his nausea and vomiting. His found to be hypotensive in the emergency room with elevated lactic acid level and there is concern for sepsis. He's been admitted to the intensive care unit and has since stabilized. Denies any other associated symptoms or modifying factors.         Past Medical History:     Past Medical History:   Diagnosis Date    Allergic rhinitis     COPD (chronic obstructive pulmonary disease) (HCC)     Diabetic neuropathy ([facility]     dr. Ida Hawthorne, podiatrist    Dizziness     DM (diabetes mellitus) ([facility]     , endocrinologist    Esophageal cancer ([facility]     GERD (gastroesophageal reflux disease)     History of intolerance  MUSCULOSKELETAL:  negative joint pains, muscle aches, swelling of joints  NEUROLOGICAL:  negative for headaches, dizziness, lightheadedness, numbness, pain, tingling extremities  BEHAVIOR/PSYCH:  negative for depression, anxiety    Physical Exam:   BP (!) 109/57   Pulse 68   Temp 97.5 °F (36.4 °C) (Oral)   Resp 19   Ht 6' 1\" (1.854 m)   Wt 167 lb 15.9 oz (76.2 kg)   SpO2 99%   BMI 22.16 kg/m²   Temp (24hrs), Av.1 °F (36.7 °C), Min:97.5 °F (36.4 °C), Max:98.6 °F (37 °C)    Recent Labs      07/10/18   0024   POCGLU  189*       Intake/Output Summary (Last 24 hours) at 07/10/18 1437  Last data filed at 07/10/18 1200   Gross per 24 hour   Intake             1725 ml   Output             1025 ml   Net              700 ml       General Appearance:  alert, well appearing, and in no acute distress  Mental status: oriented to person, place, and time with normal affect  Head:  normocephalic, atraumatic. Eye: no icterus, redness, pupils equal and reactive, extraocular eye movements intact, conjunctiva clear  Ear: normal external ear, no discharge, hearing intact  Nose:  no drainage noted  Mouth: mucous membranes moist  Neck: supple, no carotid bruits, thyroid not palpable  Lungs: Bilateral equal air entry, clear to ausculation, no wheezing, rales or rhonchi, normal effort  Cardiovascular: normal rate, regular rhythm, no murmur, gallop, rub.   Abdomen: Soft, nontender, nondistended, normal bowel sounds, no hepatomegaly or splenomegaly  Neurologic: There are no new focal motor or sensory deficits, normal muscle tone and bulk, no abnormal sensation, normal speech, cranial nerves II through XII grossly intact  Skin: No gross lesions, rashes, bruising or bleeding on exposed skin area  Extremities:  peripheral pulses palpable, no pedal edema or calf pain with palpation, right BKA, left foot dressing in place  Psych: normal affect     Investigations:      Laboratory Testing:  Recent Results (from the past 24 hour(s))   CBC Auto Differential    Collection Time: 07/09/18  6:45 PM   Result Value Ref Range    WBC 15.9 (H) 3.5 - 11.0 k/uL    RBC 4.63 4.5 - 5.9 m/uL    Hemoglobin 12.8 (L) 13.5 - 17.5 g/dL    Hematocrit 41.1 41 - 53 %    MCV 88.7 80 - 100 fL    MCH 27.6 26 - 34 pg    MCHC 31.1 31 - 37 g/dL    RDW 16.5 (H) 11.5 - 14.5 %    Platelets 303 (H) 253 - 400 k/uL    MPV 7.2 6.0 - 12.0 fL    NRBC Automated NOT REPORTED per 100 WBC    Differential Type NOT REPORTED     Immature Granulocytes NOT REPORTED 0 %    Absolute Immature Granulocyte NOT REPORTED 0.00 - 0.30 k/uL    WBC Morphology NOT REPORTED     RBC Morphology NOT REPORTED     Platelet Estimate NOT REPORTED     Seg Neutrophils 79 (H) 36 - 66 %    Lymphocytes 15 (L) 24 - 44 %    Monocytes 3 1 - 7 %    Eosinophils % 2 1 - 4 %    Basophils 1 0 - 2 %    Segs Absolute 12.40 (H) 1.8 - 7.7 k/uL    Absolute Lymph # 2.40 1.0 - 4.8 k/uL    Absolute Mono # 0.50 0.2 - 0.8 k/uL    Absolute Eos # 0.40 0.0 - 0.4 k/uL    Basophils # 0.10 0.0 - 0.2 k/uL   Basic Metabolic Panel    Collection Time: 07/09/18  6:45 PM   Result Value Ref Range    Glucose 294 (H) 70 - 99 mg/dL    BUN 30 (H) 8 - 23 mg/dL    CREATININE 1.37 (H) 0.70 - 1.20 mg/dL    Bun/Cre Ratio 22 (H) 9 - 20    Calcium 9.3 8.6 - 10.4 mg/dL    Sodium 138 135 - 144 mmol/L    Potassium 4.1 3.7 - 5.3 mmol/L    Chloride 99 98 - 107 mmol/L    CO2 20 20 - 31 mmol/L    Anion Gap 19 mmol/L    GFR Non-African American 53 (L) >60 mL/min    GFR African American >60 >60 mL/min    GFR Comment          GFR Staging NOT REPORTED    Hepatic Function Panel    Collection Time: 07/09/18  6:45 PM   Result Value Ref Range    Alb 3.3 (L) 3.5 - 5.2 g/dL    Alkaline Phosphatase 151 (H) 40 - 129 U/L    ALT 17 5 - 41 U/L    AST 5 <40 U/L    Total Bilirubin 0.14 (L) 0.3 - 1.2 mg/dL    Bilirubin, Direct <0.08 <0.31 mg/dL    Bilirubin, Indirect CANNOT BE CALCULATED 0.00 - 1.00 mg/dL    Total Protein 7.3 6.4 - 8.3 g/dL    Globulin NOT REPORTED g/dL 16. 4 (H) 11.5 - 14.5 %    Platelets 110 (H) 354 - 400 k/uL    MPV 7.1 6.0 - 12.0 fL    NRBC Automated NOT REPORTED per 100 WBC   Protime-INR    Collection Time: 07/10/18  5:26 AM   Result Value Ref Range    Protime 10.3 9.7 - 11.6 sec    INR 1.0    Ionized Calcium    Collection Time: 07/10/18  5:26 AM   Result Value Ref Range    Calcium, Ion 1.33 1.13 - 1.33 mmol/L   Magnesium    Collection Time: 07/10/18  5:26 AM   Result Value Ref Range    Magnesium 1.9 1.6 - 2.6 mg/dL   Phosphorus    Collection Time: 07/10/18  5:26 AM   Result Value Ref Range    Phosphorus 3.3 2.5 - 4.5 mg/dL   CK    Collection Time: 07/10/18  5:26 AM   Result Value Ref Range    Total CK 81 39 - 308 U/L   Troponin    Collection Time: 07/10/18  5:26 AM   Result Value Ref Range    Troponin T <0.03 <0.03 ng/mL    Troponin Interp             Imaging/Diagnostics:    Chest x-ray  Impression:        1. Pulmonary findings typical of congestive heart failure. 2. Calcific atherosclerosis aorta. 3. Cardiomegaly. 4. Probable hiatal hernia   CT scan abdomen and pelvis  Impression:        1. No acute infective or inflammatory process. 2. Small right pleural effusion and left basal bandlike pleural-parenchymal  densities.  .  Findings could be residual of prior pneumonitis. 3. Bilateral nonobstructing nephrolithiasis and renal cysts.  Some renal  hypodensities are too small to characterize. 4. Redemonstration of a shallow epigastric ventral hernia.        Assessment :      Primary Problem  Constipation due to opioid therapy    Active Hospital Problems    Diagnosis Date Noted    Constipation due to opioid therapy [K59.03, Algie Nest 07/10/2018    Acute kidney injury ([facility] [N17.9] 07/10/2018    Elevated lactic acid level [R79.89] 07/10/2018    Transient hypotension [I95.9] 07/09/2018    Lower limb amputation status ([facility] [Z89.619] 02/01/2018    Benign essential HTN [I10]     Type 2 diabetes mellitus with diabetic polyneuropathy, with long-term current

## 2018-07-10 NOTE — PROGRESS NOTES
[facility] Sepsis ([address] [A41.9] . Vitals:    07/09/18 2321   BP: (!) 122/58   Pulse: 100   Resp:    Temp:    SpO2:    . Patients home meds are   Prior to Admission medications    Medication Sig Start Date End Date Taking? Authorizing Provider   insulin glargine (TOUJEO SOLOSTAR) 300 UNIT/ML injection pen Inject 76 Units into the skin nightly    Historical Provider, MD   calcium carbonate (TUMS) 500 MG chewable tablet Take 1 tablet by mouth 3 times daily as needed for Heartburn 6/26/18 7/26/18  Donnal Redhead Orlop, DO   insulin lispro (HUMALOG) 100 UNIT/ML injection vial Inject 0-18 Units into the skin 3 times daily (with meals) 6/26/18   Donnal Redhead Orlop, DO   insulin lispro (HUMALOG) 100 UNIT/ML injection vial Inject 0-9 Units into the skin nightly 6/26/18   Donnal Redhead Orlop, DO   atorvastatin (LIPITOR) 40 MG tablet Take 1 tablet by mouth nightly 6/26/18   Eric Rojas DO   INCRUSE ELLIPTA 62.5 MCG/INH AEPB INHALE 1 PUFF INTO THE LUNGS EVERY DAY 5/7/18   Marilee Vasques DO   traZODone (DESYREL) 50 MG tablet Take 1 tablet by mouth nightly as needed for Sleep 3/20/18   Marilee Vasques DO   pregabalin (LYRICA) 75 MG capsule Take 1 capsule by mouth 2 times daily for 30 days.  3/20/18 4/19/18  Marilee Vasques DO   esomeprazole (NEXIUM) 40 MG delayed release capsule TAKE ONE CAPSULE BY MOUTH TWICE DAILY 2/28/18   Marilee Vasques DO   guaiFENesin (MUCINEX) 600 MG extended release tablet Take 2 tablets by mouth 2 times daily as needed for Congestion 2/28/18   Marilee Vasques DO   Nebulizers (COMPRESSOR/NEBULIZER) MISC 1 kit by Does not apply route 4 times daily Use QID 2/28/18   Marilee Vasques DO   albuterol sulfate HFA (VENTOLIN HFA) 108 (90 Base) MCG/ACT inhaler INHALE 2 PUFFS INTO THE LUNGS EVERY 6 HOURS AS NEEDED FOR WHEEZING 2/1/18   Rosa Delgado MD   budesonide (PULMICORT) 180 MCG/ACT AEPB inhaler Inhale 2 puffs into the lungs 2 times daily 2/1/18   Rosa Delgado MD   budesonide (PULMICORT) 0.5 MG/2ML nebulizer suspension Take 2 mLs by nebulization 2 times daily 1/23/18   Zaria Da Silva MD   ipratropium-albuterol (DUONEB) 0.5-2.5 (3) MG/3ML SOLN nebulizer solution Inhale 3 mLs into the lungs every 6 hours as needed for Other (cough and congestion) 1/23/18   Laya Espana MD   apixaban (ELIQUIS) 5 MG TABS tablet Take 5 mg by mouth 2 times daily    Historical Provider, MD   ferrous sulfate 325 (65 Fe) MG EC tablet Take 325 mg by mouth 2 times daily    Historical Provider, MD   clopidogrel (PLAVIX) 75 MG tablet Take 1 tablet by mouth daily 10/27/17   Ashley Bradley MD   aspirin EC 81 MG EC tablet Take 1 tablet by mouth daily 10/27/17   Ashley Bradley MD   fluticasone (FLONASE) 50 MCG/ACT nasal spray 1 spray by Nasal route daily as needed for Allergies (during allergy season) 6/22/17   Ashley Bradley MD   lisinopril (PRINIVIL;ZESTRIL) 10 MG tablet Take 1 tablet by mouth daily 4/11/17   Reena Wilkins MD   amLODIPine (NORVASC) 5 MG tablet Take 1 tablet by mouth daily Hold if SBP <100  Patient taking differently: Take 5 mg by mouth daily  5/25/17   Arti Colletta Beery, MD   metoprolol tartrate (LOPRESSOR) 25 MG tablet TAKE 1 TABLET BY MOUTH EVERY 12 HOURS 5/25/17   Arti Colletta Beery, MD   tamsulosin [facility]) 0.4 MG capsule Take 1 capsule by mouth daily 5/25/17   Arti Colletta Beery, MD   insulin aspart (NOVOLOG FLEXPEN) 100 UNIT/ML injection pen Inject 5 Units into the skin 3 times daily (before meals) 5/25/17   Arti Colletta Beery, MD   Insulin Pen Needle 32G X 4 MM MISC 1 each by Does not apply route daily 3/17/17   Reena Wilkins MD   senna (SENOKOT) 8.6 MG tablet Take 2 tablets by mouth nightly as needed for Constipation  Patient taking differently: Take 2 tablets by mouth nightly  11/7/16   Reena Wilkins MD   TRUEPLUS LANCETS 30G MISC USE AS DIRECTED 7/21/16   Reena Wilkins MD   .  Recent Surgical History: None = 0     Assessment     Peak Flow (asthma only)    Predicted: thin secretions []  Moderate amount of viscous secretions []  Copius, Viscious Yellow/ Secretions   CXR as reported by physician []  clear  []  Unavailable []  Infiltrates and/or consolidation  []  Unavailable []  Mucus Plugging and or lobar consolidation  []  Unavailable   Cough []  Strong, productive cough []  Weak productive cough []  No cough or weak non-productive cough   Tracy Lorenzo  11:39 PM                            FEMALE                                  MALE                            FEV1 Predicted Normal Values                        FEV1 Predicted Normal Values          Age                                     Height in Feet and Inches       Age                                     Height in Feet and Inches       4' 11\" 5' 1\" 5' 3\" 5' 5\" 5' 7\" 5' 9\" 5' 11\" 6' 1\"  4' 11\" 5' 1\" 5' 3\" 5' 5\" 5' 7\" 5' 9\" 5' 11\" 6' 1\"   42 - 45 2.49 2.66 2.84 3.03 3.22 3.42 3.62 3.83 42 - 45 2.82 3.03 3.26 3.49 3.72 3.96 4.22 4.47   46 - 49 2.40 2.57 2.76 2.94 3.14 3.33 3.54 3.75 46 - 49 2.70 2.92 3.14 3.37 3.61 3.85 4.10 4.36   50 - 53 2.31 2.48 2.66 2.85 3.04 3.24 3.45 3.66 50 - 53 2.58 2.80 3.02 3.25 3.49 3.73 3.98 4.24   54 - 57 2.21 2.38 2.57 2.75 2.95 3.14 3.35 3.56 54 - 57 2.46 2.67 2.89 3.12 3.36 3.60 3.85 4.11   58 - 61 2.10 2.28 2.46 2.65 2.84 3.04 3.24 3.45 58 - 61 2.32 2.54 2.76 2.99 3.23 3.47 3.72 3.98   62 - 65 1.99 2.17 2.35 2.54 2.73 2.93 3.13 3.34 62 - 65 2.19 2.40 2.62 2.85 3.09 3.33 3.58 3.84   66 - 69 1.88 2.05 2.23 2.42 2.61 2.81 3.02 3.23 66 - 69 2.04 2.26 2.48 2.71 2.95 3.19 3.44 3.70   70+ 1.82 1.99 2.17 2.36 2.55 2.75 2.95 3.16 70+ 1.97 2.19 2.41 2.64 2.87 3.12 3.37 3.62             Predicted Peak Expiratory Flow Rate                                       Height (in)  Female       Height (in) Male           Age 64 62 64 63 57 71 78 74 Age            40 632 273 000 582 688 894 804 600  88 23 82 07 05 70 72 74 76   25 337 352 366 381 396 411 426 441 25 447 476 505 533 562 591 619 648 677   30 329 125 941 [id number] 277 292 306 321 336 351 366 381 65 363 392 421 449 478 507 535 564 594   70 269 284 299 314 329 344 359 374 70 353 382 410 439 468 496 525 554 583   75 261 274 289 305 319 334 348 364 75 344 372 400 429 458 487 515 544 573   80 253 266 282 296 312 327 342 356 80 398 266 045 143 693 435 [phone]

## 2018-07-10 NOTE — PROGRESS NOTES
Diet intake: 26-50%  · Anthropometric Measures:  · Ht: 6' 1\" (185.4 cm)   · Current Body Wt: 167 lb 15.9 oz (76.2 kg)  · Admission Body Wt: 164 lb 14.5 oz (74.8 kg)  · Ideal Body Wt: 184 lb (83.5 kg), % Ideal Body 91%  · Adjusted Body Wt: 165 lb 5.5 oz (75 kg), body weight adjusted for BKA, Unilateral  · BMI Classification: BMI 18.5 - 24.9 Normal Weight  · Comparative Standards (Estimated Nutrition Needs):  · Estimated Daily Total Kcal: 2,100-2,200  · Estimated Daily Protein (g):     Estimated Intake vs Estimated Needs: Intake Less Than Needs    Nutrition Risk Level: Moderate    Nutrition Interventions:   Continue current diet, Start ONS  Continued Inpatient Monitoring, Coordination of Care    Nutrition Evaluation:   · Evaluation: Goals set   · Goals: PO intakes to meet >75% of estimated kcal/protein needs to promote wound healing    · Monitoring: Meal Intake, Supplement Intake, Skin Integrity, Wound Healing, Weight, Nausea or Vomiting, Diarrhea, Pertinent Labs    See Adult Nutrition Doc Flowsheet for more detail.      Diya Posada, Dietetic Intern, [address]    Electronically signed by LISA Burciaga, RDN, LD  on 07/10/18 on 1:41 PM     Contact Number: [phone]

## 2018-07-10 NOTE — PROGRESS NOTES
Patient requesting morphine and dilaudid for pain. Patient educated that NP did not order because of his blood pressure. Patient states, \"I should have stayed in the ED\".

## 2018-07-11 VITALS
HEIGHT: 73 IN | SYSTOLIC BLOOD PRESSURE: 157 MMHG | OXYGEN SATURATION: 90 % | DIASTOLIC BLOOD PRESSURE: 62 MMHG | WEIGHT: 169.7 LBS | RESPIRATION RATE: 18 BRPM | BODY MASS INDEX: 22.49 KG/M2 | HEART RATE: 88 BPM | TEMPERATURE: 98.4 F

## 2018-07-11 LAB
BUN BLDV-MCNC: 13 MG/DL (ref 8–23)
C DIFFICILE TOXINS, PCR: NORMAL
CREAT SERPL-MCNC: 0.72 MG/DL (ref 0.7–1.2)
CULTURE: ABNORMAL
DATE, STOOL #1: ABNORMAL
DATE, STOOL #2: ABNORMAL
DATE, STOOL #3: ABNORMAL
DIRECT EXAM: ABNORMAL
DIRECT EXAM: ABNORMAL
GFR AFRICAN AMERICAN: >60 ML/MIN
GFR NON-AFRICAN AMERICAN: >60 ML/MIN
GFR SERPL CREATININE-BSD FRML MDRD: NORMAL ML/MIN/{1.73_M2}
GFR SERPL CREATININE-BSD FRML MDRD: NORMAL ML/MIN/{1.73_M2}
HEMOCCULT SP1 STL QL: POSITIVE
HEMOCCULT SP2 STL QL: ABNORMAL
HEMOCCULT SP3 STL QL: ABNORMAL
Lab: ABNORMAL
SPECIMEN DESCRIPTION: ABNORMAL
SPECIMEN DESCRIPTION: NORMAL
STATUS: ABNORMAL
TIME, STOOL #1: 914
TIME, STOOL #2: ABNORMAL
TIME, STOOL #3: ABNORMAL
TOTAL CK: 58 U/L (ref 39–308)

## 2018-07-11 PROCEDURE — 36415 COLL VENOUS BLD VENIPUNCTURE: CPT

## 2018-07-11 PROCEDURE — 93923 UPR/LXTR ART STDY 3+ LVLS: CPT

## 2018-07-11 PROCEDURE — 94640 AIRWAY INHALATION TREATMENT: CPT

## 2018-07-11 PROCEDURE — 99222 1ST HOSP IP/OBS MODERATE 55: CPT | Performed by: INTERNAL MEDICINE

## 2018-07-11 PROCEDURE — 6360000002 HC RX W HCPCS: Performed by: INTERNAL MEDICINE

## 2018-07-11 PROCEDURE — G0328 FECAL BLOOD SCRN IMMUNOASSAY: HCPCS

## 2018-07-11 PROCEDURE — 6370000000 HC RX 637 (ALT 250 FOR IP): Performed by: NURSE PRACTITIONER

## 2018-07-11 PROCEDURE — 6370000000 HC RX 637 (ALT 250 FOR IP): Performed by: INTERNAL MEDICINE

## 2018-07-11 PROCEDURE — 82550 ASSAY OF CK (CPK): CPT

## 2018-07-11 PROCEDURE — 2580000003 HC RX 258: Performed by: NURSE PRACTITIONER

## 2018-07-11 PROCEDURE — 6360000002 HC RX W HCPCS: Performed by: NURSE PRACTITIONER

## 2018-07-11 PROCEDURE — 82565 ASSAY OF CREATININE: CPT

## 2018-07-11 PROCEDURE — 84520 ASSAY OF UREA NITROGEN: CPT

## 2018-07-11 RX ADMIN — ALBUTEROL SULFATE 2.5 MG: 2.5 SOLUTION RESPIRATORY (INHALATION) at 14:49

## 2018-07-11 RX ADMIN — HYDROMORPHONE HYDROCHLORIDE 0.5 MG: 1 INJECTION, SOLUTION INTRAMUSCULAR; INTRAVENOUS; SUBCUTANEOUS at 08:48

## 2018-07-11 RX ADMIN — TRAMADOL HYDROCHLORIDE 50 MG: 50 TABLET, FILM COATED ORAL at 11:45

## 2018-07-11 RX ADMIN — OXYCODONE HYDROCHLORIDE AND ACETAMINOPHEN 2 TABLET: 5; 325 TABLET ORAL at 06:25

## 2018-07-11 RX ADMIN — PREGABALIN 75 MG: 50 CAPSULE ORAL at 08:47

## 2018-07-11 RX ADMIN — APIXABAN 5 MG: 5 TABLET, FILM COATED ORAL at 08:47

## 2018-07-11 RX ADMIN — METOPROLOL TARTRATE 25 MG: 25 TABLET ORAL at 08:47

## 2018-07-11 RX ADMIN — Medication 10 ML: at 08:49

## 2018-07-11 RX ADMIN — ANTACID TABLETS 500 MG: 500 TABLET, CHEWABLE ORAL at 04:29

## 2018-07-11 RX ADMIN — ONDANSETRON 4 MG: 2 INJECTION INTRAMUSCULAR; INTRAVENOUS at 02:54

## 2018-07-11 RX ADMIN — ASPIRIN 81 MG: 81 TABLET, COATED ORAL at 08:47

## 2018-07-11 RX ADMIN — PANTOPRAZOLE SODIUM 40 MG: 40 TABLET, DELAYED RELEASE ORAL at 06:26

## 2018-07-11 RX ADMIN — LISINOPRIL 10 MG: 10 TABLET ORAL at 08:48

## 2018-07-11 RX ADMIN — HYDROMORPHONE HYDROCHLORIDE 0.5 MG: 1 INJECTION, SOLUTION INTRAMUSCULAR; INTRAVENOUS; SUBCUTANEOUS at 12:35

## 2018-07-11 RX ADMIN — TAMSULOSIN HYDROCHLORIDE 0.4 MG: 0.4 CAPSULE ORAL at 08:47

## 2018-07-11 RX ADMIN — HYDROMORPHONE HYDROCHLORIDE 0.5 MG: 1 INJECTION, SOLUTION INTRAMUSCULAR; INTRAVENOUS; SUBCUTANEOUS at 02:54

## 2018-07-11 RX ADMIN — AMLODIPINE BESYLATE 5 MG: 5 TABLET ORAL at 08:47

## 2018-07-11 RX ADMIN — FERROUS SULFATE TAB EC 325 MG (65 MG FE EQUIVALENT) 325 MG: 325 (65 FE) TABLET DELAYED RESPONSE at 08:47

## 2018-07-11 RX ADMIN — CLOPIDOGREL BISULFATE 75 MG: 75 TABLET ORAL at 08:47

## 2018-07-11 ASSESSMENT — ENCOUNTER SYMPTOMS
ABDOMINAL PAIN: 0
BACK PAIN: 0
BLOOD IN STOOL: 1
NAUSEA: 0
CONSTIPATION: 1
EYE DISCHARGE: 0
VOMITING: 0
SORE THROAT: 0
WHEEZING: 0
SHORTNESS OF BREATH: 0
BLURRED VISION: 0
DIARRHEA: 0
COUGH: 0
HEARTBURN: 1

## 2018-07-11 ASSESSMENT — PAIN SCALES - GENERAL
PAINLEVEL_OUTOF10: 5
PAINLEVEL_OUTOF10: 7
PAINLEVEL_OUTOF10: 8
PAINLEVEL_OUTOF10: 8
PAINLEVEL_OUTOF10: 7
PAINLEVEL_OUTOF10: 7
PAINLEVEL_OUTOF10: 4

## 2018-07-11 ASSESSMENT — PAIN DESCRIPTION - ORIENTATION
ORIENTATION: RIGHT

## 2018-07-11 ASSESSMENT — PAIN DESCRIPTION - LOCATION
LOCATION: SHOULDER

## 2018-07-11 ASSESSMENT — PAIN DESCRIPTION - PROGRESSION
CLINICAL_PROGRESSION: NOT CHANGED
CLINICAL_PROGRESSION: NOT CHANGED

## 2018-07-11 ASSESSMENT — PAIN DESCRIPTION - FREQUENCY
FREQUENCY: INTERMITTENT
FREQUENCY: INTERMITTENT

## 2018-07-11 ASSESSMENT — PAIN DESCRIPTION - ONSET
ONSET: ON-GOING
ONSET: ON-GOING

## 2018-07-11 ASSESSMENT — PAIN DESCRIPTION - DESCRIPTORS: DESCRIPTORS: ACHING

## 2018-07-11 ASSESSMENT — PAIN DESCRIPTION - PAIN TYPE
TYPE: CHRONIC PAIN;ACUTE PAIN
TYPE: CHRONIC PAIN;ACUTE PAIN
TYPE: ACUTE PAIN
TYPE: ACUTE PAIN

## 2018-07-11 NOTE — CONSULTS
seizures, loss of consciousness and headaches. Endo/Heme/Allergies: Negative for polydipsia. Does not bruise/bleed easily. Psychiatric/Behavioral: Positive for depression. Hallucinations: The patient is nervous/anxious. The patient does not have insomnia. Code Status:  Full Code    Physical Exam:     Vitals:  BP (!) 157/62   Pulse 88   Temp 98.4 °F (36.9 °C) (Oral)   Resp 18   Ht 6' 1\" (1.854 m)   Wt 169 lb 11.2 oz (77 kg)   SpO2 90%   BMI 22.39 kg/m²   Temp (24hrs), Av.3 °F (36.8 °C), Min:97.8 °F (36.6 °C), Max:98.6 °F (37 °C)      Physical Exam   Constitutional: He is oriented to person, place, and time. He appears well-developed and well-nourished. No distress. HENT:   Head: Normocephalic and atraumatic. Mouth/Throat: No oropharyngeal exudate. Eyes: Conjunctivae are normal. Pupils are equal, round, and reactive to light. No scleral icterus. Neck: Normal range of motion. Neck supple. No tracheal deviation present. No thyromegaly present. Cardiovascular: Normal rate, regular rhythm, normal heart sounds and intact distal pulses. No murmur heard. Pulmonary/Chest: Effort normal and breath sounds normal. No respiratory distress. He has no wheezes. He has no rales. Abdominal: Soft. Bowel sounds are normal. He exhibits no distension, no ascites and no mass. There is no hepatomegaly. There is no tenderness. There is no guarding. No hernia. No peripheral signs of ch. Liver disease   Genitourinary: Rectum normal. Guaiac stool: No fresh blood seen in the rectum. No external hemorrhoids seen. Musculoskeletal: He exhibits no edema. Ulceration of toes left foot. Has below-knee amputation right lower extremity. Lymphadenopathy:     He has no cervical adenopathy. Neurological: He is alert and oriented to person, place, and time. No cranial nerve deficit. Skin: Skin is warm and dry. No rash noted. No erythema.    Psychiatric: Thought content normal.   Nursing note and vitals reviewed.     Data:   CBC:   Lab Results   Component Value Date    WBC 15.5 07/10/2018    RBC 3.85 07/10/2018    RBC 4.32 05/02/2012    HGB 11.2 07/10/2018    HCT 34.0 07/10/2018    MCV 88.3 07/10/2018    MCH 27.8 07/10/2018    MCHC 31.5 07/10/2018    RDW 16.4 07/10/2018     07/10/2018     05/02/2012    MPV 7.1 07/10/2018     CBC with Differential:    Lab Results   Component Value Date    WBC 15.5 07/10/2018    RBC 3.85 07/10/2018    RBC 4.32 05/02/2012    HGB 11.2 07/10/2018    HCT 34.0 07/10/2018     07/10/2018     05/02/2012    MCV 88.3 07/10/2018    MCH 27.8 07/10/2018    MCHC 31.5 07/10/2018    RDW 16.4 07/10/2018    LYMPHOPCT 15 07/09/2018    MONOPCT 3 07/09/2018    BASOPCT 1 07/09/2018    MONOSABS 0.50 07/09/2018    LYMPHSABS 2.40 07/09/2018    EOSABS 0.40 07/09/2018    BASOSABS 0.10 07/09/2018    DIFFTYPE NOT REPORTED 07/09/2018     Hemoglobin/Hematocrit:    Lab Results   Component Value Date    HGB 11.2 07/10/2018    HCT 34.0 07/10/2018     CMP:    Lab Results   Component Value Date     07/10/2018    K 3.9 07/10/2018     07/10/2018    CO2 21 07/10/2018    BUN 13 07/11/2018    CREATININE 0.72 07/11/2018    GFRAA >60 07/11/2018    LABGLOM >60 07/11/2018    GLUCOSE 113 07/10/2018    GLUCOSE 129 05/02/2012    PROT 6.3 07/10/2018    LABALBU 3.0 07/10/2018    LABALBU 4.4 03/05/2012    CALCIUM 8.5 07/10/2018    BILITOT 0.11 07/10/2018    ALKPHOS 100 07/10/2018    AST 11 07/10/2018    ALT 14 07/10/2018     BMP:    Lab Results   Component Value Date     07/10/2018    K 3.9 07/10/2018     07/10/2018    CO2 21 07/10/2018    BUN 13 07/11/2018    LABALBU 3.0 07/10/2018    LABALBU 4.4 03/05/2012    CREATININE 0.72 07/11/2018    CALCIUM 8.5 07/10/2018    GFRAA >60 07/11/2018    LABGLOM >60 07/11/2018    GLUCOSE 113 07/10/2018    GLUCOSE 129 05/02/2012     PT/INR:    Lab Results   Component Value Date    PROTIME 10.3 07/10/2018    PROTIME 10.5 05/02/2012    INR 1.0

## 2018-07-11 NOTE — CONSULTS
MD   budesonide (PULMICORT) 0.5 MG/2ML nebulizer suspension Take 2 mLs by nebulization 2 times daily 1/23/18  Yes Bright Tavarez MD   ipratropium-albuterol (DUONEB) 0.5-2.5 (3) MG/3ML SOLN nebulizer solution Inhale 3 mLs into the lungs every 6 hours as needed for Other (cough and congestion) 1/23/18  Yes Bright Tavarez MD   apixaban (ELIQUIS) 5 MG TABS tablet Take 5 mg by mouth 2 times daily   Yes Historical Provider, MD   ferrous sulfate 325 (65 Fe) MG EC tablet Take 325 mg by mouth 2 times daily   Yes Historical Provider, MD   clopidogrel (PLAVIX) 75 MG tablet Take 1 tablet by mouth daily 10/27/17  Yes Santiago Ornelas MD   aspirin EC 81 MG EC tablet Take 1 tablet by mouth daily 10/27/17  Yes Santiago Ornelas MD   fluticasone (FLONASE) 50 MCG/ACT nasal spray 1 spray by Nasal route daily as needed for Allergies (during allergy season) 6/22/17  Yes Santiago Ornelas MD   lisinopril (PRINIVIL;ZESTRIL) 10 MG tablet Take 1 tablet by mouth daily 4/11/17  Yes Juan Gorman MD   amLODIPine (NORVASC) 5 MG tablet Take 1 tablet by mouth daily Hold if SBP <100 5/25/17  Yes Patti Hartman MD   metoprolol tartrate (LOPRESSOR) 25 MG tablet TAKE 1 TABLET BY MOUTH EVERY 12 HOURS 5/25/17  Yes Patti Hartman MD   tamsulosin [facility]) 0.4 MG capsule Take 1 capsule by mouth daily 5/25/17  Yes Patti Hartman MD   senna (SENOKOT) 8.6 MG tablet Take 2 tablets by mouth nightly as needed for Constipation 11/7/16  Yes Juan Gorman MD   insulin detemir (LEVEMIR FLEXTOUCH) 100 UNIT/ML injection pen Inject 10 Units into the skin daily    Historical Provider, MD   Nebulizers (COMPRESSOR/NEBULIZER) MISC 1 kit by Does not apply route 4 times daily Use QID 2/28/18   Yasir Roland,    Insulin Pen Needle 32G X 4 MM MISC 1 each by Does not apply route daily 3/17/17   Juan Gorman MD   TRUEPLUS LANCETS 30G MISC USE AS DIRECTED 7/21/16   Juan Gorman MD    Scheduled Meds:   tiotropium  18 mcg Inhalation Daily    albuterol  2.5 mg Nebulization TID    [START ON 7/11/2018] pantoprazole  40 mg Oral BID AC    melatonin  3 mg Oral QPM    budesonide  0.5 mg Nebulization BID    promethazine  12.5 mg Intravenous Once    amLODIPine  5 mg Oral Daily    apixaban  5 mg Oral BID    aspirin EC  81 mg Oral Daily    atorvastatin  40 mg Oral Nightly    clopidogrel  75 mg Oral Daily    ferrous sulfate  325 mg Oral BID    insulin glargine  61 Units Subcutaneous Nightly    lisinopril  10 mg Oral Daily    metoprolol tartrate  25 mg Oral BID    pregabalin  75 mg Oral BID    senna  2 tablet Oral Nightly    tamsulosin  0.4 mg Oral Daily    sodium chloride flush  10 mL Intravenous 2 times per day     Continuous Infusions:   dextrose       PRN Meds:.acetaminophen, traMADol, ondansetron **OR** ondansetron, albuterol, HYDROmorphone **OR** HYDROmorphone, oxyCODONE-acetaminophen, albuterol sulfate HFA, calcium carbonate, fluticasone, sodium chloride flush, glucose, dextrose, glucagon (rDNA), dextrose    Allergies  is allergic to gabapentin. Family History  family history includes Alcohol Abuse in his father, maternal aunt, maternal uncle, and paternal aunt; Cancer in his mother and sister; Diabetes in his mother. Social History   reports that he has been smoking Cigarettes. He has a 30.00 pack-year smoking history. He has never used smokeless tobacco.   reports that he drinks alcohol. reports that he does not use drugs.     Objective     Vitals:  Patient Vitals for the past 8 hrs:   BP Temp Temp src Pulse Resp SpO2   07/10/18 2030 (!) 142/66 - - 108 21 -   07/10/18 2000 (!) 148/64 - - 102 22 -   07/10/18 1936 - - - - 22 95 %   07/10/18 1930 136/88 - - 103 14 -   07/10/18 1900 (!) 141/73 - - 101 15 -   07/10/18 1800 (!) 138/57 - - 96 20 -   07/10/18 1730 137/89 - - 101 19 -   07/10/18 1700 (!) 165/92 - - 102 - 95 %   07/10/18 1630 (!) 147/75 - - 98 - 97 %   07/10/18 1600 (!) 146/66 97.8 °F (36.6 °C) Oral 93 20 95 % examined and evaluated at bedside   · Treatment options discussed in detail with the patient  · Ordered XR left foot to rule out osteo  · PVR/PPG   · Recommend vascular consult        · Dressing to Left foot: small amount of bacitracin, telfa, kerlix  · NWB to Left lower extremity  · Elevate heel off of bed with two pillows behind the calf   · Patient will need daily dressing changes at [facility] as directed above  · Patient would benefit from non-weight bearing status of the left foot to allow for healing of the ulcerations   · Follow-up with Dr. Sal Rucker as outpatient within one week of discharge; call for appointment   · Discussed with Dr. Zoë Jean [facility]  Podiatric Medicine & Surgery

## 2018-07-11 NOTE — PROGRESS NOTES
Physical Therapy  DATE: 2018    NAME: aDrlene Hoyos  MRN: [id number]   : 1955    Patient not seen this date for Physical Therapy due to:  [] Blood transfusion in progress  [] Hemodialysis  [x]  Patient Declined  [] Spine Precautions   [] Strict Bedrest  [] Surgery/ Procedure  [] Testing      [] Other Pt refuses PT at this time. RN informed. Will check back in PM if time allows. [] PT being discontinued at this time. Patient independent. No further needs. [] PT being discontinued at this time as the patient has been transferred to palliative care. No further needs.     Aliza Chatman, PTA

## 2018-07-11 NOTE — CARE COORDINATION
Social Work-[facility] will admiot at 330. [facility] to transport at 300. Discussed with pt. Orders faxed.  Gina Cagle
arrived from [place] and he is not sure how long she will be staying and he hopes she will be able to live with him and help him.         Electronically signed by Tiny Kit on 7/10/18 at 12:17 PM

## 2018-07-12 ENCOUNTER — CARE COORDINATION (OUTPATIENT)
Dept: CARE COORDINATION | Age: 63
End: 2018-07-12

## 2018-07-12 NOTE — CARE COORDINATION
Ambulatory Care Coordination Note  7/12/2018  CM Risk Score: 14  He Mortality Risk Score:      ACC: Josephine Maldonado, RN    Summary Note: Spoke with David Richard at [facility] who verified patient is in the facility. She will give HUNTER Longoria a message to call CC when a discharge date is scheduled to ensure proper follow up with PCP. She stated that the patient called 911 from the facility due to not getting a pain patch that was \"missing\". He was taken to the hospital for evaluation and returned. CC Plan:   Schedule follow up with PCP when facility calls with DC date. Care Coordination Interventions    Program Enrollment:  Complex Care  Referral from Primary Care Provider:  No  Suggested Interventions and Community Resources  Social Work:  In Process         Goals Addressed     None          Prior to Admission medications    Medication Sig Start Date End Date Taking? Authorizing Provider   insulin detemir (LEVEMIR FLEXTOUCH) 100 UNIT/ML injection pen Inject 10 Units into the skin daily    Historical Provider, MD   melatonin 3 MG TABS tablet Take 3 mg by mouth every evening    Historical Provider, MD   pantoprazole (PROTONIX) 40 MG tablet Take 40 mg by mouth 2 times daily    Historical Provider, MD   oxyCODONE-acetaminophen (PERCOCET) 5-325 MG per tablet Take 2 tablets by mouth every 4 hours as needed for Pain for up to 7 days. . 7/10/18 7/17/18  Eric Rojas DO   insulin glargine (TOUJEO SOLOSTAR) 300 UNIT/ML injection pen Inject 76 Units into the skin nightly    Historical Provider, MD   calcium carbonate (TUMS) 500 MG chewable tablet Take 1 tablet by mouth 3 times daily as needed for Heartburn 6/26/18 7/26/18  Eric Rojas DO   insulin lispro (HUMALOG) 100 UNIT/ML injection vial Inject 0-18 Units into the skin 3 times daily (with meals) 6/26/18   Stephen Rojas DO   insulin lispro (HUMALOG) 100 UNIT/ML injection vial Inject 0-9 Units into the skin nightly 6/26/18   Kathryn Posada DO atorvastatin (LIPITOR) 40 MG tablet Take 1 tablet by mouth nightly 6/26/18   Eric Rojas, DO   INCRUSE ELLIPTA 62.5 MCG/INH AEPB INHALE 1 PUFF INTO THE LUNGS EVERY DAY 5/7/18   Malena Carrero DO   pregabalin (LYRICA) 75 MG capsule Take 1 capsule by mouth 2 times daily for 30 days.  3/20/18 7/10/18  Malena Carrero DO   guaiFENesin (MUCINEX) 600 MG extended release tablet Take 2 tablets by mouth 2 times daily as needed for Congestion 2/28/18   Malena Carrero, DO   Nebulizers (COMPRESSOR/NEBULIZER) MISC 1 kit by Does not apply route 4 times daily Use QID 2/28/18   Malena Carrero DO   albuterol sulfate HFA (VENTOLIN HFA) 108 (90 Base) MCG/ACT inhaler INHALE 2 PUFFS INTO THE LUNGS EVERY 6 HOURS AS NEEDED FOR WHEEZING 2/1/18   Yan Howell MD   budesonide (PULMICORT) 180 MCG/ACT AEPB inhaler Inhale 2 puffs into the lungs 2 times daily 2/1/18   Yan Howell MD   budesonide (PULMICORT) 0.5 MG/2ML nebulizer suspension Take 2 mLs by nebulization 2 times daily 1/23/18   Candi Falk MD   ipratropium-albuterol (DUONEB) 0.5-2.5 (3) MG/3ML SOLN nebulizer solution Inhale 3 mLs into the lungs every 6 hours as needed for Other (cough and congestion) 1/23/18   Laya Espana MD   apixaban (ELIQUIS) 5 MG TABS tablet Take 5 mg by mouth 2 times daily    Historical Provider, MD   ferrous sulfate 325 (65 Fe) MG EC tablet Take 325 mg by mouth 2 times daily    Historical Provider, MD   clopidogrel (PLAVIX) 75 MG tablet Take 1 tablet by mouth daily 10/27/17   Yan Howell MD   aspirin EC 81 MG EC tablet Take 1 tablet by mouth daily 10/27/17   Yan Howell MD   fluticasone (FLONASE) 50 MCG/ACT nasal spray 1 spray by Nasal route daily as needed for Allergies (during allergy season) 6/22/17   Yan Howell MD   lisinopril (PRINIVIL;ZESTRIL) 10 MG tablet Take 1 tablet by mouth daily 4/11/17   Pedro Luis Blackman MD   amLODIPine (NORVASC) 5 MG tablet Take 1 tablet by mouth daily Hold if SBP <100 5/25/17   Patti Tejeda MD

## 2018-07-15 LAB
CULTURE: NORMAL
Lab: NORMAL
SPECIMEN DESCRIPTION: NORMAL
STATUS: NORMAL

## 2018-07-16 LAB
CULTURE: NORMAL
Lab: NORMAL
SPECIMEN DESCRIPTION: NORMAL
STATUS: NORMAL

## 2018-07-17 ENCOUNTER — CARE COORDINATION (OUTPATIENT)
Dept: CARE COORDINATION | Age: 63
End: 2018-07-17

## 2018-07-18 ENCOUNTER — CARE COORDINATION (OUTPATIENT)
Dept: CASE MANAGEMENT | Age: 63
End: 2018-07-18

## 2018-07-19 ENCOUNTER — CARE COORDINATION (OUTPATIENT)
Dept: CASE MANAGEMENT | Age: 63
End: 2018-07-19

## 2018-07-19 NOTE — CARE COORDINATION
missed them, he will have to reschedule. Asked about his medications, he stated \"can't afford them, have no insurance, outrageous cost, $17-$1800 dollars, insulin $15-$1600, blood thinner cost too much, I don't know what I'm going to do\". Sabrina Diaz ended the call by saying, \"OK\". Sanaz Cruz up the phone. Will send to [facility] for assistance. Follow Up  No future appointments.     Vinny Stock RN

## 2018-07-20 ENCOUNTER — TELEPHONE (OUTPATIENT)
Dept: FAMILY MEDICINE CLINIC | Age: 63
End: 2018-07-20

## 2018-07-23 ENCOUNTER — CARE COORDINATION (OUTPATIENT)
Dept: CASE MANAGEMENT | Age: 63
End: 2018-07-23

## 2018-07-23 ENCOUNTER — CARE COORDINATION (OUTPATIENT)
Dept: INTERNAL MEDICINE CLINIC | Age: 63
End: 2018-07-23

## 2018-07-23 ENCOUNTER — TELEPHONE (OUTPATIENT)
Dept: FAMILY MEDICINE CLINIC | Age: 63
End: 2018-07-23

## 2018-07-23 ENCOUNTER — CARE COORDINATION (OUTPATIENT)
Dept: FAMILY MEDICINE CLINIC | Age: 63
End: 2018-07-23

## 2018-07-23 ENCOUNTER — OFFICE VISIT (OUTPATIENT)
Dept: FAMILY MEDICINE CLINIC | Age: 63
End: 2018-07-23
Payer: MEDICARE

## 2018-07-23 VITALS
TEMPERATURE: 96.8 F | OXYGEN SATURATION: 95 % | SYSTOLIC BLOOD PRESSURE: 120 MMHG | RESPIRATION RATE: 20 BRPM | DIASTOLIC BLOOD PRESSURE: 60 MMHG | HEART RATE: 62 BPM | BODY MASS INDEX: 22.16 KG/M2 | WEIGHT: 168 LBS

## 2018-07-23 DIAGNOSIS — I10 BENIGN ESSENTIAL HTN: ICD-10-CM

## 2018-07-23 DIAGNOSIS — E11.42 TYPE 2 DIABETES MELLITUS WITH DIABETIC POLYNEUROPATHY, WITH LONG-TERM CURRENT USE OF INSULIN (HCC): Primary | ICD-10-CM

## 2018-07-23 DIAGNOSIS — L89.892 PRESSURE ULCER OF BKA STUMP, STAGE 2 (HCC): Primary | ICD-10-CM

## 2018-07-23 DIAGNOSIS — T87.89 PRESSURE ULCER OF BKA STUMP, STAGE 2 (HCC): Primary | ICD-10-CM

## 2018-07-23 DIAGNOSIS — Z79.4 TYPE 2 DIABETES MELLITUS WITH DIABETIC POLYNEUROPATHY, WITH LONG-TERM CURRENT USE OF INSULIN (HCC): Primary | ICD-10-CM

## 2018-07-23 DIAGNOSIS — E11.42 DM TYPE 2 WITH DIABETIC PERIPHERAL NEUROPATHY (HCC): Primary | ICD-10-CM

## 2018-07-23 DIAGNOSIS — Z89.619: ICD-10-CM

## 2018-07-23 DIAGNOSIS — Z09 HOSPITAL DISCHARGE FOLLOW-UP: ICD-10-CM

## 2018-07-23 DIAGNOSIS — M79.2 NEUROPATHIC PAIN OF RIGHT LOWER EXTREMITY: ICD-10-CM

## 2018-07-23 DIAGNOSIS — Z91.199 NON-COMPLIANCE: ICD-10-CM

## 2018-07-23 DIAGNOSIS — Z89.511 HX OF BKA, RIGHT (HCC): ICD-10-CM

## 2018-07-23 DIAGNOSIS — I26.99 OTHER ACUTE PULMONARY EMBOLISM WITHOUT ACUTE COR PULMONALE (HCC): ICD-10-CM

## 2018-07-23 DIAGNOSIS — I73.9 PVD (PERIPHERAL VASCULAR DISEASE) (HCC): ICD-10-CM

## 2018-07-23 DIAGNOSIS — I73.9 PAD (PERIPHERAL ARTERY DISEASE) (HCC): ICD-10-CM

## 2018-07-23 PROBLEM — R79.89 ELEVATED LACTIC ACID LEVEL: Status: RESOLVED | Noted: 2018-07-10 | Resolved: 2018-07-23

## 2018-07-23 LAB
GLUCOSE BLD-MCNC: 253 MG/DL
HBA1C MFR BLD: 10.8 %

## 2018-07-23 PROCEDURE — 83036 HEMOGLOBIN GLYCOSYLATED A1C: CPT | Performed by: INTERNAL MEDICINE

## 2018-07-23 PROCEDURE — 99495 TRANSJ CARE MGMT MOD F2F 14D: CPT | Performed by: INTERNAL MEDICINE

## 2018-07-23 PROCEDURE — 82962 GLUCOSE BLOOD TEST: CPT | Performed by: INTERNAL MEDICINE

## 2018-07-23 ASSESSMENT — ENCOUNTER SYMPTOMS
WHEEZING: 0
CONSTIPATION: 0
SHORTNESS OF BREATH: 0
CHEST TIGHTNESS: 0
BLURRED VISION: 0
DIARRHEA: 0
ABDOMINAL PAIN: 0
COLOR CHANGE: 1
BACK PAIN: 1
BLOOD IN STOOL: 0
STRIDOR: 0
COUGH: 0
ABDOMINAL DISTENTION: 0
NAUSEA: 0
VOMITING: 0
VISUAL CHANGE: 0
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
## 2018-07-23 NOTE — CARE COORDINATION
Ambulatory Care Coordination Note  7/23/2018  CM Risk Score: 14  He Mortality Risk Score:      ACC: Jim Ovalles RN    Summary Note: Met with patient and daughter before his office visit with PCP. Daughter moved here from [place] to help her dad with his medical needs. CC gave patient a glucose meter, demonstrated use of meter, patient returned demonstration. CC reviewed diabetes, diet, medication, what to report and glucose monitoring. CC encouraged patient and daughter to call if any of his medications cost to much. PCP gave insulin samples. Patient is getting home care,  is coming today. Patient has an appointment to get a new prosthetic leg, he has wounds from his current leg. CC offered assistance with transportation, daughter declined for now stating she will provide his rides. CC will call patient in 1 week for blood sugar log. A1C today is 10.8 will refer to Lorena Rosas dietician   Patient was given literature on smart carb counting, portion plate and meal planning. Diabetes Assessment    Medic Alert ID:  No  Meal Planning:  Avoidance of concentrated sweets   How often do you test your blood sugar?:  Daily   Do you have barriers with adherence to non-pharmacologic self-management interventions?  (Nutrition/Exercise/Self-Monitoring):  Yes   Have you ever had to go to the ED for symptoms of low blood sugar?:  No       Increase or Decrease trend in Blood Sugars       and   COPD Assessment    Does the patient understand envrionmental exposure?:  Yes  Does the patient have a nebulizer?:  Yes     No patient-reported symptoms         Symptoms:                 Care Coordination Interventions    Program Enrollment:  Complex Care  Referral from Primary Care Provider:  No  Suggested Interventions and Community Resources  Social Work:  In [facility] [address] Self Management   Improving (7/23/2018)             YUMI Self Management Goal  Patient Goal (What steps will patient take to achieve goal?): monitor and report blood sugar, low carb diet  Patient is able to discuss self-management of condition(s):Diabetes  Pt demonstrates adherence to medications  Pt demonstrates understanding of self-monitoring, CC reviewed and demonstrated how to use meter, patient returned demonstration 7.23.18  Patient is able to identify Red Flags:  Alert to potential adverse drug reactions(s) or side effects and actions to take should they arise  Discuss target symptoms and actions to take should they arise  Identify problems that require immediate PCP or specialist visit  Patient demonstrates understanding of access to PCP/Specialist:  Understands about scheduling routine Follow Up appointments   Understands about sick day appointment options for worsening of symptoms/progression (Same Day, E Visits)       Conditions and Symptoms   Improving (7/23/2018)             I will schedule office visits, as directed by my provider. I will keep my appointment or reschedule if I have to cancel. I will notify my provider of any symptoms that indicate a worsening of my condition. Barriers: none  Plan for overcoming my barriers: N/A  Confidence: 6/10  Anticipated Goal Completion Date: 5/8/18              Prior to Admission medications    Medication Sig Start Date End Date Taking?  Authorizing Provider   insulin detemir (LEVEMIR FLEXTOUCH) 100 UNIT/ML injection pen Inject 10 Units into the skin daily    Historical Provider, MD   melatonin 3 MG TABS tablet Take 3 mg by mouth every evening    Historical Provider, MD   pantoprazole (PROTONIX) 40 MG tablet Take 40 mg by mouth 2 times daily    Historical Provider, MD   insulin glargine (TOUJEO SOLOSTAR) 300 UNIT/ML injection pen Inject 76 Units into the skin nightly    Historical Provider, MD   calcium carbonate (TUMS) 500 MG chewable tablet Take 1 tablet by mouth 3 times daily as needed for Heartburn 6/26/18 7/26/18  Tamela Walker Bob, DO   insulin lispro (HUMALOG) 100 UNIT/ML injection vial Inject 0-18 Units into the skin 3 times daily (with meals) 6/26/18   Paulino Rojas DO   insulin lispro (HUMALOG) 100 UNIT/ML injection vial Inject 0-9 Units into the skin nightly 6/26/18   Paulino Rojas, DO   atorvastatin (LIPITOR) 40 MG tablet Take 1 tablet by mouth nightly 6/26/18   Eric Rojas DO   INCRUSE ELLIPTA 62.5 MCG/INH AEPB INHALE 1 PUFF INTO THE LUNGS EVERY DAY 5/7/18   Beryl Dooley DO   pregabalin (LYRICA) 75 MG capsule Take 1 capsule by mouth 2 times daily for 30 days.  3/20/18 7/10/18  Beryl Dooley DO   guaiFENesin (MUCINEX) 600 MG extended release tablet Take 2 tablets by mouth 2 times daily as needed for Congestion 2/28/18   Beryl Dooley DO   Nebulizers (COMPRESSOR/NEBULIZER) MISC 1 kit by Does not apply route 4 times daily Use QID 2/28/18   Beryl Dooley DO   albuterol sulfate HFA (VENTOLIN HFA) 108 (90 Base) MCG/ACT inhaler INHALE 2 PUFFS INTO THE LUNGS EVERY 6 HOURS AS NEEDED FOR WHEEZING 2/1/18   Franco Oh MD   budesonide (PULMICORT) 180 MCG/ACT AEPB inhaler Inhale 2 puffs into the lungs 2 times daily 2/1/18   Shelly Yanez MD   budesonide (PULMICORT) 0.5 MG/2ML nebulizer suspension Take 2 mLs by nebulization 2 times daily 1/23/18   Brandyn Muhammad MD   ipratropium-albuterol (DUONEB) 0.5-2.5 (3) MG/3ML SOLN nebulizer solution Inhale 3 mLs into the lungs every 6 hours as needed for Other (cough and congestion) 1/23/18   Laya Espana MD   apixaban (ELIQUIS) 5 MG TABS tablet Take 5 mg by mouth 2 times daily    Historical Provider, MD   ferrous sulfate 325 (65 Fe) MG EC tablet Take 325 mg by mouth 2 times daily    Historical Provider, MD   clopidogrel (PLAVIX) 75 MG tablet Take 1 tablet by mouth daily 10/27/17   Franco Oh MD   aspirin EC 81 MG EC tablet Take 1 tablet by mouth daily 10/27/17   Franco Oh MD   fluticasone (FLONASE) 50 MCG/ACT nasal spray 1 spray by Nasal route daily as needed for Allergies (during allergy season) 6/22/17   Franco Oh MD   lisinopril (PRINIVIL;ZESTRIL) 10 MG tablet Take 1 tablet by mouth daily 4/11/17   Guevara Walker MD   amLODIPine (NORVASC) 5 MG tablet Take 1 tablet by mouth daily Hold if SBP <100 5/25/17   Patti Quiñonez MD   metoprolol tartrate (LOPRESSOR) 25 MG tablet TAKE 1 TABLET BY MOUTH EVERY 12 HOURS 5/25/17   Patti Quiñonez MD   tamsulosin [facility]) 0.4 MG capsule Take 1 capsule by mouth daily 5/25/17   Patti Quiñonez MD   Insulin Pen Needle 32G X 4 MM MISC 1 each by Does not apply route daily 3/17/17   Guevara Walker MD   senna (SENOKOT) 8.6 MG tablet Take 2 tablets by mouth nightly as needed for Constipation 11/7/16   Guevara Walker MD   TRUEPLUS LANCETS 30G MISC USE AS DIRECTED 7/21/16   Guevara Walker MD       No future appointments.

## 2018-07-23 NOTE — CARE COORDINATION
Attempting to reach patient or daughter, phone not accepting VM at this time. Basaglar 20 units AM  And 20 units PM per PCP.

## 2018-07-23 NOTE — CARE COORDINATION
Patients daughter called for clarification on insulin dosing. I was unable to assist, unsure what samples were given and dosing recommendations. Please advise. Blood sugar is currently 254 per daughter, no meds yet today.

## 2018-07-23 NOTE — TELEPHONE ENCOUNTER
Keyon Santamaria a home care nurse called asking for a referral for [facility] wound care. He has a wound on his left foot and right stump. They want it faxed to [facility] at [phone]. Please advise.  Thank you

## 2018-07-24 ENCOUNTER — CARE COORDINATION (OUTPATIENT)
Dept: CARE COORDINATION | Age: 63
End: 2018-07-24

## 2018-07-24 ENCOUNTER — TELEPHONE (OUTPATIENT)
Dept: FAMILY MEDICINE CLINIC | Age: 63
End: 2018-07-24

## 2018-07-24 NOTE — CARE COORDINATION
Registered Dietitian Initial Assessment for [address]  July 24, 2018    Initial Referral Reason: Diabetes    Patient Care Team:  Eduardo Eduardo DO as PCP - General (Family Medicine)  Dio Calles MD as PCP - [facility] Attributed Provider  Roberta De Souza MD as Surgeon (Surgical Oncology)  Grady Horvath DPM as Surgeon (Podiatry)  Vi Funez MD as Consulting Physician (Ophthalmology)  Yvette Topete MD as Consulting Physician (Gastroenterology)  [facility]  Surekha Neighbors, RN as Care Coordinator  Salomón Blum RD, LD as Dietitian    Patient Active Problem List   Diagnosis    Type 2 diabetes mellitus with diabetic polyneuropathy, with long-term current use of insulin ([address]    Neuropathic pain, leg    History of cancer    Diabetic polyneuropathy ([address]    GERD (gastroesophageal reflux disease)    Allergic rhinitis    Tobacco abuse    COPD (chronic obstructive pulmonary disease) ([address]    Edentulous    Dysphagia    Lung nodules    Esophageal cancer ([address]    Fracture of humerus, left, closed    Closed fracture of humerus    DM type 2 with diabetic peripheral neuropathy (HCC)    Chronic obstructive pulmonary disease ([address]    Hyperlipidemia    Gastroesophageal reflux disease    Chronic pain syndrome    Marijuana use    History of colon polyps    Cellulitis of right heel    PVD (peripheral vascular disease) ([address]    Functional diarrhea    Sepsis due to methicillin resistant Staphylococcus aureus (MRSA) ([address]    Benign essential HTN    History of esophageal cancer    Osteomyelitis, chronic, ankle or foot ([address]    PAD (peripheral artery disease) ([address]    Other pulmonary embolism without acute cor pulmonale ([address]    Carotid stenosis, asymptomatic, bilateral    Lower limb amputation status ([address]    Fx humeral neck, right, closed, initial encounter    Transient hypotension    Constipation due to opioid therapy    Acute kidney injury [facility])       Current Outpatient Prescriptions   Medication Sig Dispense Refill    insulin detemir (LEVEMIR FLEXTOUCH) 100 UNIT/ML injection pen Inject 10 Units into the skin daily      melatonin 3 MG TABS tablet Take 3 mg by mouth every evening      pantoprazole (PROTONIX) 40 MG tablet Take 40 mg by mouth 2 times daily      insulin glargine (TOUJEO SOLOSTAR) 300 UNIT/ML injection pen Inject 76 Units into the skin nightly      calcium carbonate (TUMS) 500 MG chewable tablet Take 1 tablet by mouth 3 times daily as needed for Heartburn      insulin lispro (HUMALOG) 100 UNIT/ML injection vial Inject 0-18 Units into the skin 3 times daily (with meals) 1 vial 3    insulin lispro (HUMALOG) 100 UNIT/ML injection vial Inject 0-9 Units into the skin nightly 1 vial 3    atorvastatin (LIPITOR) 40 MG tablet Take 1 tablet by mouth nightly 30 tablet 3    INCRUSE ELLIPTA 62.5 MCG/INH AEPB INHALE 1 PUFF INTO THE LUNGS EVERY DAY 60 each 0    pregabalin (LYRICA) 75 MG capsule Take 1 capsule by mouth 2 times daily for 30 days.  60 capsule 3    guaiFENesin (MUCINEX) 600 MG extended release tablet Take 2 tablets by mouth 2 times daily as needed for Congestion 40 tablet 1    Nebulizers (COMPRESSOR/NEBULIZER) MISC 1 kit by Does not apply route 4 times daily Use QID 1 each 0    albuterol sulfate HFA (VENTOLIN HFA) 108 (90 Base) MCG/ACT inhaler INHALE 2 PUFFS INTO THE LUNGS EVERY 6 HOURS AS NEEDED FOR WHEEZING 18 g 5    budesonide (PULMICORT) 180 MCG/ACT AEPB inhaler Inhale 2 puffs into the lungs 2 times daily 1 each 5    budesonide (PULMICORT) 0.5 MG/2ML nebulizer suspension Take 2 mLs by nebulization 2 times daily 60 ampule 0    ipratropium-albuterol (DUONEB) 0.5-2.5 (3) MG/3ML SOLN nebulizer solution Inhale 3 mLs into the lungs every 6 hours as needed for Other (cough and congestion) 360 mL 0    apixaban (ELIQUIS) 5 MG TABS tablet Take 5 mg by mouth 2 times daily      ferrous sulfate 325 (65 Fe) MG EC tablet Take 325 mg by mouth 2 times daily      clopidogrel (PLAVIX) 75 MG tablet Take 1 tablet by mouth daily 30 tablet 2    aspirin EC 81 MG EC tablet Take 1 tablet by mouth daily 30 tablet 3    fluticasone (FLONASE) 50 MCG/ACT nasal spray 1 spray by Nasal route daily as needed for Allergies (during allergy season) 1 Bottle 2    lisinopril (PRINIVIL;ZESTRIL) 10 MG tablet Take 1 tablet by mouth daily  3    amLODIPine (NORVASC) 5 MG tablet Take 1 tablet by mouth daily Hold if SBP <100 30 tablet 0    metoprolol tartrate (LOPRESSOR) 25 MG tablet TAKE 1 TABLET BY MOUTH EVERY 12 HOURS 60 tablet 0    tamsulosin (FLOMAX) 0.4 MG capsule Take 1 capsule by mouth daily 30 capsule 3    Insulin Pen Needle 32G X 4 MM MISC 1 each by Does not apply route daily 120 each 3    senna (SENOKOT) 8.6 MG tablet Take 2 tablets by mouth nightly as needed for Constipation 60 tablet 0    TRUEPLUS LANCETS 30G MISC USE AS DIRECTED 300 each 3     No current facility-administered medications for this visit.           Visit for:  Obesity/Weight loss  Diabetes: X  Hypertension:  Hyperlipidemia:  Other:     Anthropometric Measurements:  HT: 6'  Weight: 168  IBW: 178 + or - 10%  BMI: 22      Biochemical Data, Medical Tests and Procedures:    Lab Results   Component Value Date    LABA1C 10.8 07/23/2018     Lab Results   Component Value Date     06/24/2018       Lab Results   Component Value Date    CHOL 174 06/24/2018    CHOL 163 02/28/2018    CHOL 80 01/25/2017     Lab Results   Component Value Date    TRIG 175 (H) 06/24/2018    TRIG 300 (H) 02/28/2018    TRIG 76 01/25/2017     Lab Results   Component Value Date    HDL 49 06/24/2018    HDL 39 (L) 02/28/2018    HDL 34 (L) 01/25/2017     Lab Results   Component Value Date    LDLCHOLESTEROL 90 06/24/2018    LDLCHOLESTEROL 64 02/28/2018    LDLCHOLESTEROL 31 01/25/2017     Lab Results   Component Value Date    VLDL NOT REPORTED 06/24/2018    VLDL NOT REPORTED 02/28/2018    VLDL NOT REPORTED 01/25/2017 intake:  Fat/chol intake:  Carb intake: X  Other:    Plan:  1. Educate daughter and patient on carbohydrate counting, plate method. Will mail carb counting, reading food labels and serving size information to their home. Plate method also discussed, will mail picture of plate method and list of non-starchy vegetables, lean proteins. 2. Continue to reinforce timing and spacing of meals and importance of not skipping meals. Will follow up with patient and patient's daughter in 2 weeks to review information I am sending them and answer questions.     OLGA Díaz

## 2018-07-30 ENCOUNTER — CARE COORDINATION (OUTPATIENT)
Dept: CARE COORDINATION | Age: 63
End: 2018-07-30

## 2018-07-30 NOTE — CARE COORDINATION
attempted to reach Deepthi Duggan however no answer.  left message stating she was trying to reach him or his daughter Esperanza due to a recent referral from [facility].  left contact information.

## 2018-08-01 ENCOUNTER — CARE COORDINATION (OUTPATIENT)
Dept: CARE COORDINATION | Age: 63
End: 2018-08-01

## 2018-08-01 NOTE — CARE COORDINATION
Attempting to reach patient to see if he would like to  patient assistance forms or have them mailed. Unable to leave VM, mailbox is full, CC will mail.

## 2018-08-03 ENCOUNTER — HOSPITAL ENCOUNTER (OUTPATIENT)
Dept: WOUND CARE | Age: 63
Discharge: HOME | End: 2018-08-03
Payer: MEDICARE

## 2018-08-03 VITALS
RESPIRATION RATE: 20 BRPM | DIASTOLIC BLOOD PRESSURE: 79 MMHG | SYSTOLIC BLOOD PRESSURE: 115 MMHG | WEIGHT: 168 LBS | HEART RATE: 98 BPM | TEMPERATURE: 98.5 F | BODY MASS INDEX: 22.26 KG/M2 | HEIGHT: 73 IN

## 2018-08-03 DIAGNOSIS — T87.9 COMPLICATION OF BELOW KNEE AMPUTATION STUMP (HCC): Chronic | ICD-10-CM

## 2018-08-03 DIAGNOSIS — I73.9 PVD (PERIPHERAL VASCULAR DISEASE) (HCC): Primary | ICD-10-CM

## 2018-08-03 DIAGNOSIS — E11.621 DIABETIC ULCER OF LEFT MIDFOOT ASSOCIATED WITH TYPE 2 DIABETES MELLITUS, WITH FAT LAYER EXPOSED (HCC): ICD-10-CM

## 2018-08-03 DIAGNOSIS — L97.422 DIABETIC ULCER OF LEFT MIDFOOT ASSOCIATED WITH TYPE 2 DIABETES MELLITUS, WITH FAT LAYER EXPOSED (HCC): ICD-10-CM

## 2018-08-03 PROBLEM — L97.509 DIABETIC FOOT ULCER (HCC): Status: ACTIVE | Noted: 2018-08-03

## 2018-08-03 PROBLEM — L97.509 DIABETIC FOOT ULCER (HCC): Chronic | Status: ACTIVE | Noted: 2018-08-03

## 2018-08-03 PROCEDURE — 97597 DBRDMT OPN WND 1ST 20 CM/<: CPT

## 2018-08-03 PROCEDURE — 6370000000 HC RX 637 (ALT 250 FOR IP): Performed by: SURGERY

## 2018-08-03 PROCEDURE — 99202 OFFICE O/P NEW SF 15 MIN: CPT

## 2018-08-03 RX ADMIN — LIDOCAINE HYDROCHLORIDE: 20 JELLY TOPICAL at 10:54

## 2018-08-03 ASSESSMENT — PAIN DESCRIPTION - PAIN TYPE: TYPE: ACUTE PAIN;CHRONIC PAIN

## 2018-08-03 ASSESSMENT — PAIN DESCRIPTION - ORIENTATION: ORIENTATION: RIGHT;LEFT

## 2018-08-03 ASSESSMENT — PAIN SCALES - GENERAL: PAINLEVEL_OUTOF10: 7

## 2018-08-03 ASSESSMENT — PAIN DESCRIPTION - DESCRIPTORS: DESCRIPTORS: ACHING;BURNING

## 2018-08-03 ASSESSMENT — PAIN DESCRIPTION - LOCATION: LOCATION: LEG;ARM

## 2018-08-03 NOTE — PROGRESS NOTES
Description Full thickness 8/3/2018 10:22 AM   [facility]%Wound Bed 100 8/3/2018 10:22 AM   Number of days: 0       Wound 08/03/18 [address] [place] [address]  Right Posterior Upper Thigh PT (6/1/18) Trauma  (Active)   Wound Type Wound 8/3/2018 10:22 AM   Wound Traumatic 8/3/2018 10:22 AM   Wound Cleansed Rinsed/Irrigated with saline 8/3/2018 10:22 AM   Wound Length (cm) 1 cm 8/3/2018 10:22 AM   Wound Width (cm) 0.7 cm 8/3/2018 10:22 AM   Wound Depth (cm)  .1 8/3/2018 10:22 AM   Calculated Wound Size (cm^2) (l*w) 0.7 cm^2 8/3/2018 10:22 AM   Wound Assessment Pink 8/3/2018 10:22 AM   Drainage Amount JESSE 8/3/2018 10:22 AM   Drainage Description Yellow 8/3/2018 10:22 AM   Odor None 8/3/2018 10:22 AM   Margins Undefined edges 8/3/2018 10:22 AM   Odalys-wound Assessment Calloused 8/3/2018 10:22 AM   Non-staged Wound Description Partial thickness 8/3/2018 10:22 AM   [facility]%Wound Bed 100 8/3/2018 10:22 AM   Number of days: 0     Procedure Note:    Debridement: Excisional    Description of procedure: The wound was locally anesthetized with 2% lidocaine gel. A curette was used to sharply debride and removed the base granulation tissue. Excellent bleeding    Percent of wounds debrided: 100    Total Surface Area: 0.75    Bleeding Minimal    Hemostasis not needed    Response: treatment well tolerated. Assessment:   3. 63 yr old male with breakdown of skin at BKA stump due to pressure  2. Diabetic foot ulcer    Problem List Items Addressed This Visit     Diabetic foot ulcer ([address] (Chronic)    Complication of below knee amputation stump ([address] (Chronic)    PVD (peripheral vascular disease) ([address] - Primary          Plan:   1. Follow up with  prosthetics for adjustment and for offloading shoe on the left lateral foot. 2. Patient's HgB A1C is 10.9 and is prohibiting adequate healing. He will benefit from diabetic education and adjustment of his diabetes medications.     Electronically signed by Darrel Minor MD on 8/3/2018 at 11:19 AM

## 2018-08-06 ENCOUNTER — TELEPHONE (OUTPATIENT)
Dept: FAMILY MEDICINE CLINIC | Age: 63
End: 2018-08-06

## 2018-08-06 ENCOUNTER — CARE COORDINATION (OUTPATIENT)
Dept: CARE COORDINATION | Age: 63
End: 2018-08-06

## 2018-08-06 NOTE — TELEPHONE ENCOUNTER
Yes he can have anything sample wise we have of lantus, basaglar or levemir at current dosing.  Thanks

## 2018-08-08 ENCOUNTER — CARE COORDINATION (OUTPATIENT)
Dept: CARE COORDINATION | Age: 63
End: 2018-08-08

## 2018-08-13 ENCOUNTER — CARE COORDINATION (OUTPATIENT)
Dept: CARE COORDINATION | Age: 63
End: 2018-08-13

## 2018-08-13 NOTE — CARE COORDINATION
Ambulatory Care Coordination Note  8/13/2018  CM Risk Score: 14  He Mortality Risk Score:      ACC: Neeta Salazar RN    Summary Note: Spoke with patient regarding missed PCP appointment. He stated that he forgot, he rescheduled for 8-15-18. He stated his blood sugar has been around 180-250 and he is feeling ok. CC asked if he contacted the number that was provided to remove the secondary insurance information so that he may get a new meter. He said that his daughter is supposed to take care of it. CC Plan:  Follow up with patient at upcoming appointment. Diabetes Assessment    Medic Alert ID:  No  Meal Planning:  Avoidance of concentrated sweets   How often do you test your blood sugar?:  Daily   Do you have barriers with adherence to non-pharmacologic self-management interventions?  (Nutrition/Exercise/Self-Monitoring):  Yes   Have you ever had to go to the ED for symptoms of low blood sugar?:  No       Increase or Decrease trend in Blood Sugars       and   COPD Assessment    Does the patient understand envrionmental exposure?:  Yes  Does the patient have a nebulizer?:  Yes     No patient-reported symptoms         Symptoms:                   Care Coordination Interventions    Program Enrollment:  Complex Care  Referral from Primary Care Provider:  No  Suggested Interventions and Community Resources  Registered Dietician:  Completed  Social Work:  In [facility] Management   Improving (8/13/2018)             CC Self Management Goal  Patient Goal (What steps will patient take to achieve goal?): monitor and report blood sugar, low carb diet  Patient is able to discuss self-management of condition(s):Diabetes  Pt demonstrates adherence to medications  Pt demonstrates understanding of self-monitoring, CC reviewed and demonstrated how to use meter, patient returned demonstration 7.23.18  Patient is able to identify Red Flags:  Alert to potential adverse drug reactions(s) or side effects and actions to take should they arise  Discuss target symptoms and actions to take should they arise  Identify problems that require immediate PCP or specialist visit  Patient demonstrates understanding of access to PCP/Specialist:  Understands about scheduling routine Follow Up appointments   Understands about sick day appointment options for worsening of symptoms/progression (Same Day, E Visits)       Conditions and Symptoms   Improving (8/13/2018)             I will schedule office visits, as directed by my provider. I will keep my appointment or reschedule if I have to cancel. I will notify my provider of any symptoms that indicate a worsening of my condition. Barriers: none  Plan for overcoming my barriers: N/A  Confidence: 6/10  Anticipated Goal Completion Date: 5/8/18              Prior to Admission medications    Medication Sig Start Date End Date Taking? Authorizing Provider   blood glucose test strips (ASCENSIA AUTODISC VI;ONE TOUCH ULTRA TEST VI) strip Use with associated glucose meter.  8/6/18   Anitra Garcia DO   insulin detemir (LEVEMIR FLEXTOUCH) 100 UNIT/ML injection pen Inject 10 Units into the skin daily    Historical Provider, MD   melatonin 3 MG TABS tablet Take 3 mg by mouth every evening    Historical Provider, MD   pantoprazole (PROTONIX) 40 MG tablet Take 40 mg by mouth 2 times daily    Historical Provider, MD   insulin lispro (HUMALOG) 100 UNIT/ML injection vial Inject 0-18 Units into the skin 3 times daily (with meals) 6/26/18   Gee Rojas DO   insulin lispro (HUMALOG) 100 UNIT/ML injection vial Inject 0-9 Units into the skin nightly 6/26/18   Gee Rojas DO   atorvastatin (LIPITOR) 40 MG tablet Take 1 tablet by mouth nightly 6/26/18   Eric Rojas DO   INCRUSE ELLIPTA 62.5 MCG/INH AEPB INHALE 1 PUFF INTO THE LUNGS EVERY DAY 5/7/18   Anitra Garcia DO   pregabalin (LYRICA) 75 MG capsule Take 1 capsule by mouth 2 times daily for 30 days.  3/20/18 7/10/18  Genevieve Mahmood,    guaiFENesin (MUCINEX) 600 MG extended release tablet Take 2 tablets by mouth 2 times daily as needed for Congestion 2/28/18   Genevieve Mahmood DO   Nebulizers (COMPRESSOR/NEBULIZER) MISC 1 kit by Does not apply route 4 times daily Use QID 2/28/18   Genevieve Mahmood DO   albuterol sulfate HFA (VENTOLIN HFA) 108 (90 Base) MCG/ACT inhaler INHALE 2 PUFFS INTO THE LUNGS EVERY 6 HOURS AS NEEDED FOR WHEEZING 2/1/18   Marbella Jeronimo MD   budesonide (PULMICORT) 180 MCG/ACT AEPB inhaler Inhale 2 puffs into the lungs 2 times daily 2/1/18   Marbella Jeronimo MD   budesonide (PULMICORT) 0.5 MG/2ML nebulizer suspension Take 2 mLs by nebulization 2 times daily 1/23/18   Umair Bhatia MD   ipratropium-albuterol (DUONEB) 0.5-2.5 (3) MG/3ML SOLN nebulizer solution Inhale 3 mLs into the lungs every 6 hours as needed for Other (cough and congestion) 1/23/18   Laya Espana MD   apixaban (ELIQUIS) 5 MG TABS tablet Take 5 mg by mouth 2 times daily    Historical Provider, MD   ferrous sulfate 325 (65 Fe) MG EC tablet Take 325 mg by mouth 2 times daily    Historical Provider, MD   clopidogrel (PLAVIX) 75 MG tablet Take 1 tablet by mouth daily 10/27/17   Marbella Jeronimo MD   aspirin EC 81 MG EC tablet Take 1 tablet by mouth daily 10/27/17   Marbella Jeronimo MD   fluticasone (FLONASE) 50 MCG/ACT nasal spray 1 spray by Nasal route daily as needed for Allergies (during allergy season) 6/22/17   Marbella Jeronimo MD   lisinopril (PRINIVIL;ZESTRIL) 10 MG tablet Take 1 tablet by mouth daily 4/11/17   Natalya Obregon MD   amLODIPine (NORVASC) 5 MG tablet Take 1 tablet by mouth daily Hold if SBP <100 5/25/17   Patti Pierre MD   metoprolol tartrate (LOPRESSOR) 25 MG tablet TAKE 1 TABLET BY MOUTH EVERY 12 HOURS 5/25/17   aPtti Pierre MD   tamsulosin (FLOMAX) 0.4 MG capsule Take 1 capsule by mouth daily 5/25/17   Patti Pierre MD   Insulin Pen Needle 32G X 4 MM MISC 1 each by Does not apply route daily 3/17/17   Melissa Jacobs MD   senna (SENOKOT) 8.6 MG tablet Take 2 tablets by mouth nightly as needed for Constipation 11/7/16   Melissa Jacobs MD   TRUEPLUS LANCETS 30G MISC USE AS DIRECTED 7/21/16   Melissa Jacobs MD       Future Appointments  Date Time Provider Jordon Frost   8/15/2018 8:30 AM DO Eliu Gold

## 2018-08-13 NOTE — CARE COORDINATION
Patient did not show up for face to face visit, missed doctor's appointment.   YUMI Granados nurse called patient while I was at the office and rescheduled him for 8/15/18 at 8:30am.

## 2018-08-15 ENCOUNTER — OFFICE VISIT (OUTPATIENT)
Dept: FAMILY MEDICINE CLINIC | Age: 63
End: 2018-08-15
Payer: MEDICARE

## 2018-08-15 ENCOUNTER — CARE COORDINATION (OUTPATIENT)
Dept: CARE COORDINATION | Age: 63
End: 2018-08-15

## 2018-08-15 VITALS
HEIGHT: 73 IN | TEMPERATURE: 96.9 F | OXYGEN SATURATION: 93 % | RESPIRATION RATE: 18 BRPM | DIASTOLIC BLOOD PRESSURE: 74 MMHG | HEART RATE: 84 BPM | BODY MASS INDEX: 21.2 KG/M2 | SYSTOLIC BLOOD PRESSURE: 136 MMHG | WEIGHT: 160 LBS

## 2018-08-15 DIAGNOSIS — E11.42 TYPE 2 DIABETES MELLITUS WITH DIABETIC POLYNEUROPATHY, WITH LONG-TERM CURRENT USE OF INSULIN (HCC): Primary | ICD-10-CM

## 2018-08-15 DIAGNOSIS — Z91.199 NONCOMPLIANCE: ICD-10-CM

## 2018-08-15 DIAGNOSIS — Z79.4 TYPE 2 DIABETES MELLITUS WITH DIABETIC POLYNEUROPATHY, WITH LONG-TERM CURRENT USE OF INSULIN (HCC): Primary | ICD-10-CM

## 2018-08-15 DIAGNOSIS — Z12.11 SCREENING FOR COLON CANCER: ICD-10-CM

## 2018-08-15 DIAGNOSIS — Z23 NEED FOR PROPHYLACTIC VACCINATION AND INOCULATION AGAINST VARICELLA: ICD-10-CM

## 2018-08-15 LAB — HBA1C MFR BLD: 10.3 %

## 2018-08-15 PROCEDURE — 4004F PT TOBACCO SCREEN RCVD TLK: CPT | Performed by: INTERNAL MEDICINE

## 2018-08-15 PROCEDURE — 83036 HEMOGLOBIN GLYCOSYLATED A1C: CPT | Performed by: INTERNAL MEDICINE

## 2018-08-15 PROCEDURE — G8598 ASA/ANTIPLAT THER USED: HCPCS | Performed by: INTERNAL MEDICINE

## 2018-08-15 PROCEDURE — 99214 OFFICE O/P EST MOD 30 MIN: CPT | Performed by: INTERNAL MEDICINE

## 2018-08-15 PROCEDURE — G8427 DOCREV CUR MEDS BY ELIG CLIN: HCPCS | Performed by: INTERNAL MEDICINE

## 2018-08-15 PROCEDURE — 3046F HEMOGLOBIN A1C LEVEL >9.0%: CPT | Performed by: INTERNAL MEDICINE

## 2018-08-15 PROCEDURE — 2022F DILAT RTA XM EVC RTNOPTHY: CPT | Performed by: INTERNAL MEDICINE

## 2018-08-15 PROCEDURE — 81002 URINALYSIS NONAUTO W/O SCOPE: CPT | Performed by: INTERNAL MEDICINE

## 2018-08-15 PROCEDURE — G8420 CALC BMI NORM PARAMETERS: HCPCS | Performed by: INTERNAL MEDICINE

## 2018-08-15 PROCEDURE — 3017F COLORECTAL CA SCREEN DOC REV: CPT | Performed by: INTERNAL MEDICINE

## 2018-08-15 RX ORDER — ATORVASTATIN CALCIUM 40 MG/1
40 TABLET, FILM COATED ORAL NIGHTLY
Qty: 30 TABLET | Refills: 3 | Status: SHIPPED | OUTPATIENT
Start: 2018-08-15 | End: 2018-09-12 | Stop reason: ALTCHOICE

## 2018-08-15 RX ORDER — INSULIN GLARGINE 100 [IU]/ML
20 INJECTION, SOLUTION SUBCUTANEOUS 2 TIMES DAILY
COMMUNITY
End: 2019-01-14 | Stop reason: SDUPTHER

## 2018-08-15 ASSESSMENT — PATIENT HEALTH QUESTIONNAIRE - PHQ9
SUM OF ALL RESPONSES TO PHQ QUESTIONS 1-9: 0
2. FEELING DOWN, DEPRESSED OR HOPELESS: 0
SUM OF ALL RESPONSES TO PHQ QUESTIONS 1-9: 0
SUM OF ALL RESPONSES TO PHQ9 QUESTIONS 1 & 2: 0
1. LITTLE INTEREST OR PLEASURE IN DOING THINGS: 0

## 2018-08-15 NOTE — CARE COORDINATION
to start eating breakfast daily. 2.Encourgaed patient to use plate method at meals, patient shown picture of plate method and explained to him and his son. Discussed importance of protein source at each meal and provided him with a handout listing protein sources. Low carb, high protein snack list provided to patient. Will follow up with patient in 3 weeks to review and answer questions.     Envysion

## 2018-08-16 ENCOUNTER — CARE COORDINATION (OUTPATIENT)
Dept: CARE COORDINATION | Age: 63
End: 2018-08-16

## 2018-08-16 ASSESSMENT — ENCOUNTER SYMPTOMS
VOMITING: 0
DIARRHEA: 0
BLOOD IN STOOL: 0
CHEST TIGHTNESS: 0
BACK PAIN: 1
WHEEZING: 0
BLURRED VISION: 0
CONSTIPATION: 0
CHOKING: 0
COUGH: 0
VISUAL CHANGE: 0
ABDOMINAL PAIN: 0
COLOR CHANGE: 0
SHORTNESS OF BREATH: 0
STRIDOR: 0
NAUSEA: 0

## 2018-08-16 NOTE — CARE COORDINATION
steps will patient take to achieve goal?): monitor and report blood sugar, low carb diet  Patient is able to discuss self-management of condition(s):Diabetes  Pt demonstrates adherence to medications  Pt demonstrates understanding of self-monitoring, CC reviewed and demonstrated how to use meter, patient returned demonstration 7.23.18  Patient is able to identify Red Flags:  Alert to potential adverse drug reactions(s) or side effects and actions to take should they arise  Discuss target symptoms and actions to take should they arise  Identify problems that require immediate PCP or specialist visit  Patient demonstrates understanding of access to PCP/Specialist:  Understands about scheduling routine Follow Up appointments   Understands about sick day appointment options for worsening of symptoms/progression (Same Day, E Visits)       Conditions and Symptoms   Improving (8/16/2018)             I will schedule office visits, as directed by my provider. I will keep my appointment or reschedule if I have to cancel. I will notify my provider of any symptoms that indicate a worsening of my condition. Barriers: none  Plan for overcoming my barriers: N/A  Confidence: 6/10  Anticipated Goal Completion Date: 5/8/18         Nutrition Plan   No change (8/16/2018)             I will follow a nutritional plan as directed   Calorie Controlled:   2000 Calories carb consistent    Barriers: lack of education  Plan for overcoming my barriers: dietitian to educate on carbohydrate counting, reading food labels, portion control  Confidence: 6/10  Anticipated Goal Completion Date: 11/30/2018            Prior to Admission medications    Medication Sig Start Date End Date Taking? Authorizing Provider   insulin glargine (LANTUS) 100 UNIT/ML injection vial Inject 20 Units into the skin 2 times daily    Historical Provider, MD   zoster recombinant adjuvanted vaccine (SHINGRIX) 50 MCG SUSR injection 50 MCG IM then repeat 2-6 months.

## 2018-08-16 NOTE — PROGRESS NOTES
headaches or pallor. Associated symptoms include fatigue, foot paresthesias, foot ulcerations and weakness. Pertinent negatives for diabetes include no blurred vision, no chest pain, no polydipsia, no polyphagia, no polyuria and no visual change. There are no hypoglycemic complications. Diabetic complications include autonomic neuropathy, a CVA, peripheral neuropathy and PVD. Risk factors for coronary artery disease include diabetes mellitus, dyslipidemia, male sex, sedentary lifestyle, stress and tobacco exposure. Current diabetic treatment includes intensive insulin program and insulin injections. He is compliant with treatment some of the time. His weight is stable. He is following a generally unhealthy diet. When asked about meal planning, he reported none. He has had a previous visit with a dietitian. He never participates in exercise. His home blood glucose trend is fluctuating minimally. His dinner blood glucose range is generally >200 mg/dl. His overall blood glucose range is >200 mg/dl. An ACE inhibitor/angiotensin II receptor blocker is being taken. He sees a podiatrist.      Hemoglobin A1C (%)   Date Value   08/15/2018 10.3   07/23/2018 10.8   06/24/2018 12.7 (H)             ( goal A1C is < 7)   Microalb/Crt.  Ratio (mcg/mg creat)   Date Value   08/18/2016 370 (H)     LDL Cholesterol (mg/dL)   Date Value   06/24/2018 90   02/28/2018 64   01/25/2017 31       (goal LDL is <100)   AST (U/L)   Date Value   07/10/2018 11     ALT (U/L)   Date Value   07/10/2018 14     BUN (mg/dL)   Date Value   07/11/2018 13     BP Readings from Last 3 Encounters:   08/15/18 136/74   08/03/18 115/79   07/23/18 120/60          (goal 120/80)    Past Medical History:   Diagnosis Date    Allergic rhinitis     COPD (chronic obstructive pulmonary disease) (HCC)     [facility]     dr. Lauren Sanchez, podiatrist    Dizziness     DM (diabetes mellitus) ([facility]     , endocrinologist    Esophageal cancer ([facility]     GERD INHALE 1 PUFF INTO THE LUNGS EVERY DAY 60 each 0    Nebulizers (COMPRESSOR/NEBULIZER) MISC 1 kit by Does not apply route 4 times daily Use QID 1 each 0    albuterol sulfate HFA (VENTOLIN HFA) 108 (90 Base) MCG/ACT inhaler INHALE 2 PUFFS INTO THE LUNGS EVERY 6 HOURS AS NEEDED FOR WHEEZING 18 g 5    budesonide (PULMICORT) 0.5 MG/2ML nebulizer suspension Take 2 mLs by nebulization 2 times daily 60 ampule 0    ipratropium-albuterol (DUONEB) 0.5-2.5 (3) MG/3ML SOLN nebulizer solution Inhale 3 mLs into the lungs every 6 hours as needed for Other (cough and congestion) 360 mL 0    apixaban (ELIQUIS) 5 MG TABS tablet Take 5 mg by mouth 2 times daily      aspirin EC 81 MG EC tablet Take 1 tablet by mouth daily 30 tablet 3    amLODIPine (NORVASC) 5 MG tablet Take 1 tablet by mouth daily Hold if SBP <100 30 tablet 0    Insulin Pen Needle 32G X 4 MM MISC 1 each by Does not apply route daily 120 each 3    TRUEPLUS LANCETS 30G MISC USE AS DIRECTED 300 each 3     No current facility-administered medications for this visit. Allergies   Allergen Reactions    Gabapentin Other (See Comments)     dizziness       Health Maintenance   Topic Date Due    Hepatitis C screen  1955    HIV screen  10/10/1972    Shingles Vaccine (1 of 2 - 2 Dose Series) 10/10/2007    Diabetic retinal exam  03/18/2016    Colon cancer screen colonoscopy  04/26/2017    Diabetic microalbuminuria test  08/18/2017    Low dose CT lung screening  01/20/2018    DTaP/Tdap/Td vaccine (1 - Tdap) 10/10/2026 (Originally 10/10/1976)    Flu vaccine (1) 09/01/2018    A1C test (Diabetic or Prediabetic)  11/15/2018    Lipid screen  06/24/2019    Diabetic foot exam  07/10/2019    Potassium monitoring  07/10/2019    Creatinine monitoring  07/11/2019    Pneumococcal med risk  Completed       Subjective:      Review of Systems   Constitutional: Positive for fatigue.  Negative for activity change, appetite change, chills, diaphoresis, fever and unexpected weight change. Eyes: Negative for blurred vision and visual disturbance. Respiratory: Negative for cough, choking, chest tightness, shortness of breath, wheezing and stridor. Cardiovascular: Negative for chest pain, palpitations and leg swelling. Gastrointestinal: Negative for abdominal pain, blood in stool, constipation, diarrhea, nausea and vomiting. Endocrine: Negative for polydipsia, polyphagia and polyuria. Musculoskeletal: Positive for arthralgias, back pain and gait problem. Skin: Negative for color change, pallor and rash. Neurological: Positive for weakness and numbness. Negative for dizziness, light-headedness and headaches. Hematological: Negative for adenopathy. Bruises/bleeds easily. Psychiatric/Behavioral: Negative for confusion, decreased concentration and sleep disturbance. Objective:     Physical Exam   Constitutional: He is oriented to person, place, and time. He appears well-developed and well-nourished. He is active. Non-toxic appearance. He has a sickly appearance. No distress. Chronically ill , NAD    HENT:   Head: Normocephalic and atraumatic. Right Ear: External ear normal.   Left Ear: External ear normal.   Nose: Nose normal.   Eyes: Pupils are equal, round, and reactive to light. EOM are normal.   Neck: Trachea normal. Neck supple. No JVD present. Decreased range of motion present. No thyroid mass present. Cardiovascular: Normal rate, regular rhythm, S1 normal and S2 normal.   No extrasystoles are present. Exam reveals no S3.    Murmur heard. Pulses:       Carotid pulses are 1+ on the left side. Dorsalis pedis pulses are 1+ on the left side. Posterior tibial pulses are 1+ on the left side. 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 He has decreased breath sounds. He has no wheezes. He has no rhonchi. He has no rales. Chest wall is not dull to percussion. He exhibits no mass and no tenderness. Abdominal: Soft. Bowel sounds are normal. There is no splenomegaly or hepatomegaly. There is no tenderness. Lymphadenopathy:     He has no cervical adenopathy. Neurological: He is alert and oriented to person, place, and time. He displays atrophy. A sensory deficit is present. No cranial nerve deficit. Skin: Skin is warm and dry. No rash noted. He is not diaphoretic. Psychiatric: He has a normal mood and affect. He expresses inappropriate judgment. Nursing note and vitals reviewed. /74 (Site: Left Arm, Position: Sitting, Cuff Size: Large Adult)   Pulse 84   Temp 96.9 °F (36.1 °C) (Tympanic)   Resp 18   Ht 6' 0.99\" (1.854 m)   Wt 160 lb (72.6 kg)   SpO2 93%   BMI 21.11 kg/m²     Assessment:       Diagnosis Orders   1. Type 2 diabetes mellitus with diabetic polyneuropathy, with long-term current use of insulin (HCC)  POCT Urinalysis no Micro    POCT glycosylated hemoglobin (Hb A1C)   2. Screening for colon cancer  POCT Fecal Immunochemical Test (FIT)   3. Need for prophylactic vaccination and inoculation against varicella  zoster recombinant adjuvanted vaccine (SHINGRIX) 50 MCG SUSR injection             Plan:      No Follow-up on file. Orders Placed This Encounter   Procedures    POCT Urinalysis no Micro    POCT glycosylated hemoglobin (Hb A1C)    POCT Fecal Immunochemical Test (FIT)     Standing Status:   Future     Standing Expiration Date:   8/15/2019     Orders Placed This Encounter   Medications    zoster recombinant adjuvanted vaccine (SHINGRIX) 50 MCG SUSR injection     Si MCG IM then repeat 2-6 months. Dispense:  0.5 mL     Refill:  1    atorvastatin (LIPITOR) 40 MG tablet     Sig: Take 1 tablet by mouth nightly     Dispense:  30 tablet     Refill:  3    Given tresiba samples as this is the only long acting insulin samples we have currently.  Do not like the patient noucngin around between samples but he is not going to pay out of pocket ofr anything so better to be

## 2018-08-24 ENCOUNTER — CARE COORDINATION (OUTPATIENT)
Dept: INTERNAL MEDICINE CLINIC | Age: 63
End: 2018-08-24

## 2018-08-24 PROBLEM — L97.422 DIABETIC ULCER OF LEFT MIDFOOT ASSOCIATED WITH TYPE 2 DIABETES MELLITUS, WITH FAT LAYER EXPOSED (HCC): Chronic | Status: ACTIVE | Noted: 2018-08-03

## 2018-08-24 PROBLEM — T87.9 COMPLICATION OF BELOW KNEE AMPUTATION STUMP (HCC): Status: ACTIVE | Noted: 2018-08-03

## 2018-08-24 NOTE — CARE COORDINATION
Ambulatory Care Coordination Note  8/24/2018  CM Risk Score: 10  He Mortality Risk Score:      ACC: Tray Stratton RN    Summary Note: Spoke with patient who stated he did get a new glucose meter, his blood sugar is averaging between 230-240. CC stated that a message will be routed to provider to see if he should increased insulin. CC Plan:   Follow up with patient regarding PCP recommendations    Care Coordination Interventions    Program Enrollment:  Complex Care  Referral from Primary Care Provider:  No  Suggested Interventions and Community Resources  Registered Dietician:  Completed  Social Work:  In [facility]   Improving (8/24/2018)             CC Self Management Goal  Patient Goal (What steps will patient take to achieve goal?): monitor and report blood sugar, low carb diet  Patient is able to discuss self-management of condition(s):Diabetes  Pt demonstrates adherence to medications  Pt demonstrates understanding of self-monitoring, CC reviewed and demonstrated how to use meter, patient returned demonstration 7.23.18  Patient is able to identify Red Flags:  Alert to potential adverse drug reactions(s) or side effects and actions to take should they arise  Discuss target symptoms and actions to take should they arise  Identify problems that require immediate PCP or specialist visit  Patient demonstrates understanding of access to PCP/Specialist:  Understands about scheduling routine Follow Up appointments   Understands about sick day appointment options for worsening of symptoms/progression (Same Day, E Visits)       Conditions and Symptoms   Improving (8/24/2018)             I will schedule office visits, as directed by my provider. I will keep my appointment or reschedule if I have to cancel. I will notify my provider of any symptoms that indicate a worsening of my condition.     Barriers: none  Plan for overcoming amLODIPine (NORVASC) 5 MG tablet Take 1 tablet by mouth daily Hold if SBP <100 5/25/17   Arti Lavonne Krabbe, MD   Insulin Pen Needle 32G X 4 MM MISC 1 each by Does not apply route daily 3/17/17   Ziyad Grimaldo MD   TRUEPLUS LANCETS 30G MISC USE AS DIRECTED 7/21/16   Ziyad Grimaldo MD       Future Appointments  Date Time Provider Jordon [facility]   9/12/2018 10:00 AM DO Abram Miller

## 2018-08-27 NOTE — CARE COORDINATION
Ambulatory Care Coordination Note  8/27/2018  CM Risk Score: 10  He Mortality Risk Score:      ACC: Clem Montes, RN    Summary Note: CC left VM for patient with instructions from PCP as follows:    Would increase tresiba if that is what he is still using for long acting insulin as that is the samples I gave him last by 5 units every week until fasting blood sugars around 130/140 or until next visit and can go to next version of sliding scale.  I printed them out sliding scale with three different sliding scales so he should go up to next one from the one I had highlighted , so likely the high dose sliding scale as I believe I had highlighted the average sliding scale.              Care Coordination Interventions    Program Enrollment:  Complex Care  Referral from Primary Care Provider:  No  Suggested Interventions and Community Resources  Registered Dietician:  Completed  Social Work:  In [facility]   Improving (8/24/2018)             CC Self Management Goal  Patient Goal (What steps will patient take to achieve goal?): monitor and report blood sugar, low carb diet  Patient is able to discuss self-management of condition(s):Diabetes  Pt demonstrates adherence to medications  Pt demonstrates understanding of self-monitoring, CC reviewed and demonstrated how to use meter, patient returned demonstration 7.23.18  Patient is able to identify Red Flags:  Alert to potential adverse drug reactions(s) or side effects and actions to take should they arise  Discuss target symptoms and actions to take should they arise  Identify problems that require immediate PCP or specialist visit  Patient demonstrates understanding of access to PCP/Specialist:  Understands about scheduling routine Follow Up appointments   Understands about sick day appointment options for worsening of symptoms/progression (Same Day, E Visits)       Conditions and

## 2018-08-29 ENCOUNTER — CARE COORDINATION (OUTPATIENT)
Dept: CARE COORDINATION | Age: 63
End: 2018-08-29

## 2018-08-29 NOTE — CARE COORDINATION
Nutrition Care Coordinator Follow-Up visit:    Food Recall:Eating 2 meals/day    Activity Level:  Sedentary: X  Lightly Active: Moderately Active:  Very Active:    Adult BMI:  Underweight (below 18.5)  Normal Weight (18.5-24. 9) X  Overweight (25-29. 9)  Obese (30-39. 9)  Morbidly Obese (>40)    Weight Change: none noted    Plan:  Plan was established with patient:  Increase dietary fiber by consuming whole grains, fruits and vegetables: X  Limit dietary cholesterol to >200mg/day: Increase water intake:  Avoid added sugar: X  Avoid sweetened beverages: X  Choose lean meats:      Monitoring: Will monitor weight:  Will monitor adherence to meal plan: Will monitor adherence to exercise plan: Will monitor HGA1c: X    Handouts Provided :  Low Carb snacking: X  Carb counting /individual meal plan:  Portion Control: X  Food Labels: X  Physical Activity:  Low Fat/Cholesterol:  Hypo/Hyperglycemia:  Calorie Controlled Meal Plan:    Goals: Increase water consumption to 8oz. 6-8 times daily:  Manage blood sugars by consuming 3 meals spaced every 4-5 hours with 2-3 snacks daily: Discussed/reviewed  Increase fiber and decrease fat intake by consuming 1-2 fruit servings and 2-3 vegetable servings per day. Increase physical activity by:  Consume less than 2,000mg of sodium/day  Avoid consumption of sweetened beverages and added sugar by reading food labels: Discussed  Monitor blood sugars by using meter to check blood glucose before morning meal and 2 hours after a meal daily: BS high- 300's, he was out of one of his insulins, contacted his [address] He will  samples today. Decrease risk of coronary heart disease by consuming fish that contains omega-3 fatty acids at least twice a week, avoiding partially hydrogenated oil/trans fats and limiting saturated fat intake by reading food labels:    Patient goals set: 1. Patient continues to skip breakfast.  We reviewed quick, easy breakfast ideas and foods he

## 2018-08-29 NOTE — CARE COORDINATION
(8/29/2018)             I will schedule office visits, as directed by my provider. I will keep my appointment or reschedule if I have to cancel. I will notify my provider of any symptoms that indicate a worsening of my condition. Barriers: none  Plan for overcoming my barriers: N/A  Confidence: 6/10  Anticipated Goal Completion Date: 5/8/18              Prior to Admission medications    Medication Sig Start Date End Date Taking? Authorizing Provider   insulin glargine (LANTUS) 100 UNIT/ML injection vial Inject 20 Units into the skin 2 times daily    Historical Provider, MD   zoster recombinant adjuvanted vaccine (SHINGRIX) 50 MCG SUSR injection 50 MCG IM then repeat 2-6 months. 8/15/18 8/15/19  Myrtis Moulding, DO   atorvastatin (LIPITOR) 40 MG tablet Take 1 tablet by mouth nightly 8/15/18   Getachewtis Moulding, DO   blood glucose test strips (ASCENSIA AUTODISC VI;ONE TOUCH ULTRA TEST VI) strip Use with associated glucose meter.  8/6/18   Geetha Blanca DO   INCRUSE ELLIPTA 62.5 MCG/INH AEPB INHALE 1 PUFF INTO THE LUNGS EVERY DAY 5/7/18   Myrtis Moulding, DO   Nebulizers (COMPRESSOR/NEBULIZER) MISC 1 kit by Does not apply route 4 times daily Use QID 2/28/18   Getachewtis Moulding, DO   albuterol sulfate HFA (VENTOLIN HFA) 108 (90 Base) MCG/ACT inhaler INHALE 2 PUFFS INTO THE LUNGS EVERY 6 HOURS AS NEEDED FOR WHEEZING 2/1/18   Marjorie Metcalf MD   budesonide (PULMICORT) 0.5 MG/2ML nebulizer suspension Take 2 mLs by nebulization 2 times daily 1/23/18   Nenita Johnson MD   ipratropium-albuterol (DUONEB) 0.5-2.5 (3) MG/3ML SOLN nebulizer solution Inhale 3 mLs into the lungs every 6 hours as needed for Other (cough and congestion) 1/23/18   Laya Espana MD   apixaban (ELIQUIS) 5 MG TABS tablet Take 5 mg by mouth 2 times daily    Historical Provider, MD   aspirin EC 81 MG EC tablet Take 1 tablet by mouth daily 10/27/17   Marjorie Metcalf MD   amLODIPine (NORVASC) 5 MG tablet Take 1 tablet by mouth daily Hold if SBP <100 5/25/17   Patti

## 2018-09-06 ENCOUNTER — PRE-PROCEDURE TELEPHONE (OUTPATIENT)
Dept: WOUND CARE | Age: 63
End: 2018-09-06

## 2018-09-06 ENCOUNTER — HOSPITAL ENCOUNTER (EMERGENCY)
Age: 63
Discharge: HOME | End: 2018-09-07
Attending: EMERGENCY MEDICINE
Payer: MEDICARE

## 2018-09-06 ENCOUNTER — CARE COORDINATION (OUTPATIENT)
Dept: CARE COORDINATION | Age: 63
End: 2018-09-06

## 2018-09-06 DIAGNOSIS — L97.309 DIABETIC ULCER OF ANKLE (HCC): Primary | ICD-10-CM

## 2018-09-06 DIAGNOSIS — L03.116 CELLULITIS OF LEFT LOWER EXTREMITY: ICD-10-CM

## 2018-09-06 DIAGNOSIS — E11.622 DIABETIC ULCER OF ANKLE (HCC): Primary | ICD-10-CM

## 2018-09-06 PROCEDURE — 82947 ASSAY GLUCOSE BLOOD QUANT: CPT

## 2018-09-06 PROCEDURE — 99283 EMERGENCY DEPT VISIT LOW MDM: CPT

## 2018-09-06 NOTE — PROGRESS NOTES
Daughter calls states she is concerned about her dad's two wounds with green drainage. States he doesn't feel well. States the home care nurse told her he has missed appointments. Writer informed as discussed prior with home care nurse. Informed that signs of infection include redness, warmth, drainage with odor or colored drainage (green), fever, chills, discomfort and fatigue. Informed that soonest appointment would not likely be for 7 days, encouraged to follow up with PCP for concerns such as infection or for urgent needs to go to nearest ED. Daughter verbalized understanding.     Electronically signed by Mark Driver RN on 9/6/2018 at 1:00 PM

## 2018-09-07 ENCOUNTER — CARE COORDINATION (OUTPATIENT)
Dept: CARE COORDINATION | Age: 63
End: 2018-09-07

## 2018-09-07 VITALS
TEMPERATURE: 98.2 F | OXYGEN SATURATION: 94 % | HEART RATE: 94 BPM | DIASTOLIC BLOOD PRESSURE: 80 MMHG | HEIGHT: 73 IN | RESPIRATION RATE: 18 BRPM | WEIGHT: 170 LBS | SYSTOLIC BLOOD PRESSURE: 139 MMHG | BODY MASS INDEX: 22.53 KG/M2

## 2018-09-07 LAB
ABSOLUTE EOS #: 0.3 K/UL (ref 0–0.4)
ABSOLUTE IMMATURE GRANULOCYTE: ABNORMAL K/UL (ref 0–0.3)
ABSOLUTE LYMPH #: 2.2 K/UL (ref 1–4.8)
ABSOLUTE MONO #: 0.8 K/UL (ref 0.2–0.8)
ANION GAP SERPL CALCULATED.3IONS-SCNC: 12 MMOL/L (ref 9–17)
BASOPHILS # BLD: 1 % (ref 0–2)
BASOPHILS ABSOLUTE: 0.1 K/UL (ref 0–0.2)
BUN BLDV-MCNC: 16 MG/DL (ref 8–23)
BUN/CREAT BLD: 21 (ref 9–20)
CALCIUM SERPL-MCNC: 9.1 MG/DL (ref 8.6–10.4)
CHLORIDE BLD-SCNC: 96 MMOL/L (ref 98–107)
CO2: 27 MMOL/L (ref 20–31)
CREAT SERPL-MCNC: 0.77 MG/DL (ref 0.7–1.2)
DIFFERENTIAL TYPE: ABNORMAL
EOSINOPHILS RELATIVE PERCENT: 2 % (ref 1–4)
GFR AFRICAN AMERICAN: >60 ML/MIN
GFR NON-AFRICAN AMERICAN: >60 ML/MIN
GFR SERPL CREATININE-BSD FRML MDRD: ABNORMAL ML/MIN/{1.73_M2}
GFR SERPL CREATININE-BSD FRML MDRD: ABNORMAL ML/MIN/{1.73_M2}
GLUCOSE BLD-MCNC: 331 MG/DL (ref 70–99)
GLUCOSE BLD-MCNC: 370 MG/DL (ref 75–110)
HCT VFR BLD CALC: 41.8 % (ref 41–53)
HEMOGLOBIN: 13.2 G/DL (ref 13.5–17.5)
IMMATURE GRANULOCYTES: ABNORMAL %
LACTIC ACID: 1.4 MMOL/L (ref 0.5–2.2)
LYMPHOCYTES # BLD: 19 % (ref 24–44)
MCH RBC QN AUTO: 28.1 PG (ref 26–34)
MCHC RBC AUTO-ENTMCNC: 31.5 G/DL (ref 31–37)
MCV RBC AUTO: 89.1 FL (ref 80–100)
MONOCYTES # BLD: 7 % (ref 1–7)
NRBC AUTOMATED: ABNORMAL PER 100 WBC
PDW BLD-RTO: 14.9 % (ref 11.5–14.5)
PLATELET # BLD: 478 K/UL (ref 130–400)
PLATELET ESTIMATE: ABNORMAL
PMV BLD AUTO: 7.2 FL (ref 6–12)
POTASSIUM SERPL-SCNC: 4.7 MMOL/L (ref 3.7–5.3)
RBC # BLD: 4.69 M/UL (ref 4.5–5.9)
RBC # BLD: ABNORMAL 10*6/UL
SEG NEUTROPHILS: 71 % (ref 36–66)
SEGMENTED NEUTROPHILS ABSOLUTE COUNT: 8.2 K/UL (ref 1.8–7.7)
SODIUM BLD-SCNC: 135 MMOL/L (ref 135–144)
WBC # BLD: 11.5 K/UL (ref 3.5–11)
WBC # BLD: ABNORMAL 10*3/UL

## 2018-09-07 PROCEDURE — 80048 BASIC METABOLIC PNL TOTAL CA: CPT

## 2018-09-07 PROCEDURE — 83605 ASSAY OF LACTIC ACID: CPT

## 2018-09-07 PROCEDURE — 6360000002 HC RX W HCPCS: Performed by: NURSE PRACTITIONER

## 2018-09-07 PROCEDURE — 6370000000 HC RX 637 (ALT 250 FOR IP): Performed by: NURSE PRACTITIONER

## 2018-09-07 PROCEDURE — 85025 COMPLETE CBC W/AUTO DIFF WBC: CPT

## 2018-09-07 RX ORDER — DOXYCYCLINE HYCLATE 100 MG
100 TABLET ORAL ONCE
Status: DISCONTINUED | OUTPATIENT
Start: 2018-09-07 | End: 2018-09-07 | Stop reason: CLARIF

## 2018-09-07 RX ORDER — DOXYCYCLINE 100 MG/1
100 CAPSULE ORAL ONCE
Status: COMPLETED | OUTPATIENT
Start: 2018-09-07 | End: 2018-09-07

## 2018-09-07 RX ORDER — SULFAMETHOXAZOLE AND TRIMETHOPRIM 800; 160 MG/1; MG/1
1 TABLET ORAL 2 TIMES DAILY
Qty: 20 TABLET | Refills: 0 | Status: SHIPPED | OUTPATIENT
Start: 2018-09-07 | End: 2018-09-17

## 2018-09-07 RX ORDER — MORPHINE SULFATE 2 MG/ML
2 INJECTION, SOLUTION INTRAMUSCULAR; INTRAVENOUS ONCE
Status: COMPLETED | OUTPATIENT
Start: 2018-09-07 | End: 2018-09-07

## 2018-09-07 RX ORDER — DOXYCYCLINE HYCLATE 100 MG
100 TABLET ORAL 2 TIMES DAILY
Qty: 20 TABLET | Refills: 0 | Status: SHIPPED | OUTPATIENT
Start: 2018-09-07 | End: 2019-01-10

## 2018-09-07 RX ORDER — HYDROCODONE BITARTRATE AND ACETAMINOPHEN 5; 325 MG/1; MG/1
1 TABLET ORAL EVERY 6 HOURS PRN
Qty: 20 TABLET | Refills: 0 | Status: SHIPPED | OUTPATIENT
Start: 2018-09-07 | End: 2018-09-14

## 2018-09-07 RX ORDER — SULFAMETHOXAZOLE AND TRIMETHOPRIM 800; 160 MG/1; MG/1
1 TABLET ORAL ONCE
Status: COMPLETED | OUTPATIENT
Start: 2018-09-07 | End: 2018-09-07

## 2018-09-07 RX ADMIN — DOXYCYCLINE 100 MG: 100 CAPSULE ORAL at 02:29

## 2018-09-07 RX ADMIN — SULFAMETHOXAZOLE AND TRIMETHOPRIM 1 TABLET: 800; 160 TABLET ORAL at 02:29

## 2018-09-07 RX ADMIN — MORPHINE SULFATE 2 MG: 2 INJECTION, SOLUTION INTRAMUSCULAR; INTRAVENOUS at 00:49

## 2018-09-07 ASSESSMENT — PAIN SCALES - GENERAL
PAINLEVEL_OUTOF10: 6
PAINLEVEL_OUTOF10: 8

## 2018-09-07 ASSESSMENT — ENCOUNTER SYMPTOMS
CHEST TIGHTNESS: 0
SHORTNESS OF BREATH: 0
NAUSEA: 0
VOMITING: 0
COUGH: 0

## 2018-09-07 ASSESSMENT — PAIN DESCRIPTION - ORIENTATION: ORIENTATION: LEFT

## 2018-09-07 ASSESSMENT — PAIN DESCRIPTION - LOCATION: LOCATION: ANKLE

## 2018-09-07 NOTE — ED NOTES
Patient back to room by wheelchair with complaint of wound infection. Patient states that he noticed three days ago blood on his sock on his left lateral ankle. Patient states he does not remember injuring it at any point. Patient's family states that his sock had green and yellow exudate on it over the past three days. Per patient, he has not been moving around at home due to the pain, and he has been having hot and cold flashes. Patient is unsure if he had a fever. Patient has a below the knee amputation to the right leg. Patient is alert and oriented x 4. He has a 5.5 cm long by 3.5 cm wide wound covered in slough. There is no exudate at this time. Patient has brisk capillary return to the left toes but pedal pulse and tibialis posterior pulse was found by Landon Gonsalez CNP, with doppler. Respirations are even and unlabored, heart rate is regular and tachy.       Brenda Cheung RN  09/07/18 [id number]

## 2018-09-07 NOTE — CARE COORDINATION
INTO THE LUNGS EVERY 6 HOURS AS NEEDED FOR WHEEZING 2/1/18   Luanne Stahl MD   budesonide (PULMICORT) 0.5 MG/2ML nebulizer suspension Take 2 mLs by nebulization 2 times daily 1/23/18   Panchito Ni MD   ipratropium-albuterol (DUONEB) 0.5-2.5 (3) MG/3ML SOLN nebulizer solution Inhale 3 mLs into the lungs every 6 hours as needed for Other (cough and congestion) 1/23/18   Laya Espana MD   apixaban (ELIQUIS) 5 MG TABS tablet Take 5 mg by mouth 2 times daily    Historical Provider, MD   aspirin EC 81 MG EC tablet Take 1 tablet by mouth daily 10/27/17   Luanne Stahl MD   amLODIPine (NORVASC) 5 MG tablet Take 1 tablet by mouth daily Hold if SBP <100 5/25/17   Patti Jacobsen MD   Insulin Pen Needle 32G X 4 MM MISC 1 each by Does not apply route daily 3/17/17   Franco Kwok MD   TRUEPLUS LANCETS 30G MISC USE AS DIRECTED 7/21/16   Franco Kwok MD       Future Appointments  Date Time Provider Jordon Frost   9/12/2018 10:00 AM DO Azul Gunn
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
## 2018-09-10 ENCOUNTER — CARE COORDINATION (OUTPATIENT)
Dept: CARE COORDINATION | Age: 63
End: 2018-09-10

## 2018-09-10 NOTE — CARE COORDINATION
Ambulatory Care Coordination Note  9/10/2018  CM Risk Score: 10  He Mortality Risk Score:      ACC: Maggie Crow RN    Summary Note: Spoke with patient who denied any needs from PCP or CC at this time. Patient stated he has a home nurse coming out a couple of times a week, he will be there today. Per patient his blood sugar is improving it was 154 last night. He had not checked it yet today. CC encouraged him to get his medications and check his blood sugar since it is almost lunch time now. Patient understood. Patient was reminded and encouraged to keep his upcoming appointment with PCP on 9.12.18.  CC Plan:   Follow up with patient at upcoming PCP appointment  Diabetes Assessment    Medic Alert ID:  No  Meal Planning:  Avoidance of concentrated sweets   How often do you test your blood sugar?:  Daily   Do you have barriers with adherence to non-pharmacologic self-management interventions?  (Nutrition/Exercise/Self-Monitoring):  Yes   Have you ever had to go to the ED for symptoms of low blood sugar?:  No       Increase or Decrease trend in Blood Sugars       and   COPD Assessment    Does the patient understand envrionmental exposure?:  Yes  Does the patient have a nebulizer?:  Yes     No patient-reported symptoms         Symptoms:                   Care Coordination Interventions    Program Enrollment:  Complex Care  Referral from Primary Care Provider:  No  Suggested Interventions and [address] [place] [address] Hwy:  Completed  Registered Dietician:  Completed  Social Work:  In Process         Goals Addressed             Most Recent     Care Coordination Self Management   Improving (9/10/2018)             CC Self Management Goal  Patient Goal (What steps will patient take to achieve goal?): monitor and report blood sugar, low carb diet  Patient is able to discuss self-management of condition(s):Diabetes  Pt demonstrates adherence to medications  Pt demonstrates understanding of self-monitoring, CC reviewed and demonstrated how to use meter, patient returned demonstration 7.23.18  Patient is able to identify Red Flags:  Alert to potential adverse drug reactions(s) or side effects and actions to take should they arise  Discuss target symptoms and actions to take should they arise  Identify problems that require immediate PCP or specialist visit  Patient demonstrates understanding of access to PCP/Specialist:  Understands about scheduling routine Follow Up appointments   Understands about sick day appointment options for worsening of symptoms/progression (Same Day, E Visits)       Conditions and Symptoms   Improving (9/10/2018)             I will schedule office visits, as directed by my provider. I will keep my appointment or reschedule if I have to cancel. I will notify my provider of any symptoms that indicate a worsening of my condition. Barriers: none  Plan for overcoming my barriers: N/A  Confidence: 6/10  Anticipated Goal Completion Date: 5/8/18         Nutrition Plan   No change (9/10/2018)             I will follow a nutritional plan as directed   Calorie Controlled:   2000 Calories carb consistent    Barriers: lack of education  Plan for overcoming my barriers: dietitian to educate on carbohydrate counting, reading food labels, portion control  Confidence: 6/10  Anticipated Goal Completion Date: 11/30/2018            Prior to Admission medications    Medication Sig Start Date End Date Taking? Authorizing Provider   HYDROcodone-acetaminophen (NORCO) 5-325 MG per tablet Take 1 tablet by mouth every 6 hours as needed for Pain for up to 7 days. . 9/7/18 9/14/18  Leora A Lump, APRN - CNP   doxycycline hyclate (VIBRA-TABS) 100 MG tablet Take 1 tablet by mouth 2 times daily 9/7/18   Namrata Goodwin Lump, APRN - CNP   sulfamethoxazole-trimethoprim (BACTRIM DS) 800-160 MG per tablet Take 1 tablet by mouth 2 times daily for 10 days 9/7/18 9/17/18  Namrata Goodwin Lump, APRN - CNP   insulin glargine

## 2018-09-12 ENCOUNTER — OFFICE VISIT (OUTPATIENT)
Dept: FAMILY MEDICINE CLINIC | Age: 63
End: 2018-09-12
Payer: MEDICARE

## 2018-09-12 ENCOUNTER — CARE COORDINATION (OUTPATIENT)
Dept: CARE COORDINATION | Age: 63
End: 2018-09-12

## 2018-09-12 VITALS
WEIGHT: 172 LBS | SYSTOLIC BLOOD PRESSURE: 134 MMHG | HEART RATE: 82 BPM | TEMPERATURE: 97.6 F | DIASTOLIC BLOOD PRESSURE: 86 MMHG | HEIGHT: 73 IN | BODY MASS INDEX: 22.8 KG/M2 | OXYGEN SATURATION: 95 % | RESPIRATION RATE: 17 BRPM

## 2018-09-12 DIAGNOSIS — Z79.4 TYPE 2 DIABETES MELLITUS WITH DIABETIC POLYNEUROPATHY, WITH LONG-TERM CURRENT USE OF INSULIN (HCC): Primary | ICD-10-CM

## 2018-09-12 DIAGNOSIS — Z12.11 SCREENING FOR COLON CANCER: ICD-10-CM

## 2018-09-12 DIAGNOSIS — E11.42 TYPE 2 DIABETES MELLITUS WITH DIABETIC POLYNEUROPATHY, WITH LONG-TERM CURRENT USE OF INSULIN (HCC): Primary | ICD-10-CM

## 2018-09-12 LAB — HBA1C MFR BLD: 9.3 %

## 2018-09-12 PROCEDURE — 82044 UR ALBUMIN SEMIQUANTITATIVE: CPT | Performed by: INTERNAL MEDICINE

## 2018-09-12 PROCEDURE — 83036 HEMOGLOBIN GLYCOSYLATED A1C: CPT | Performed by: INTERNAL MEDICINE

## 2018-09-12 PROCEDURE — 99213 OFFICE O/P EST LOW 20 MIN: CPT | Performed by: INTERNAL MEDICINE

## 2018-09-12 PROCEDURE — G8420 CALC BMI NORM PARAMETERS: HCPCS | Performed by: INTERNAL MEDICINE

## 2018-09-12 PROCEDURE — 2022F DILAT RTA XM EVC RTNOPTHY: CPT | Performed by: INTERNAL MEDICINE

## 2018-09-12 PROCEDURE — 3046F HEMOGLOBIN A1C LEVEL >9.0%: CPT | Performed by: INTERNAL MEDICINE

## 2018-09-12 PROCEDURE — 3017F COLORECTAL CA SCREEN DOC REV: CPT | Performed by: INTERNAL MEDICINE

## 2018-09-12 PROCEDURE — G8598 ASA/ANTIPLAT THER USED: HCPCS | Performed by: INTERNAL MEDICINE

## 2018-09-12 PROCEDURE — G8427 DOCREV CUR MEDS BY ELIG CLIN: HCPCS | Performed by: INTERNAL MEDICINE

## 2018-09-12 PROCEDURE — 4004F PT TOBACCO SCREEN RCVD TLK: CPT | Performed by: INTERNAL MEDICINE

## 2018-09-12 NOTE — PATIENT INSTRUCTIONS
Patient Education        Stopping Smoking: Care Instructions  Your Care Instructions  Cigarette smokers crave the nicotine in cigarettes. Giving it up is much harder than simply changing a habit. Your body has to stop craving the nicotine. It is hard to quit, but you can do it. There are many tools that people use to quit smoking. You may find that combining tools works best for you. There are several steps to quitting. First you get ready to quit. Then you get support to help you. After that, you learn new skills and behaviors to become a nonsmoker. For many people, a necessary step is getting and using medicine. Your doctor will help you set up the plan that best meets your needs. You may want to attend a smoking cessation program to help you quit smoking. When you choose a program, look for one that has proven success. Ask your doctor for ideas. You will greatly increase your chances of success if you take medicine as well as get counseling or join a cessation program.  Some of the changes you feel when you first quit tobacco are uncomfortable. Your body will miss the nicotine at first, and you may feel short-tempered and grumpy. You may have trouble sleeping or concentrating. Medicine can help you deal with these symptoms. You may struggle with changing your smoking habits and rituals. The last step is the tricky one: Be prepared for the smoking urge to continue for a time. This is a lot to deal with, but keep at it. You will feel better. Follow-up care is a key part of your treatment and safety. Be sure to make and go to all appointments, and call your doctor if you are having problems. It's also a good idea to know your test results and keep a list of the medicines you take. How can you care for yourself at home? · Ask your family, friends, and coworkers for support. You have a better chance of quitting if you have help and support.   · Join a support group, such as Nicotine Anonymous, for people who are trying to quit smoking. · Consider signing up for a smoking cessation program, such as the American Lung Association's Freedom from Smoking program.  · Get text messaging support. Go to the website at www.smokefree. gov to sign up for the [facility] program.  · Set a quit date. Pick your date carefully so that it is not right in the middle of a big deadline or stressful time. Once you quit, do not even take a puff. Get rid of all ashtrays and lighters after your last cigarette. Clean your house and your clothes so that they do not smell of smoke. · Learn how to be a nonsmoker. Think about ways you can avoid those things that make you reach for a cigarette. ¨ Avoid situations that put you at greatest risk for smoking. For some people, it is hard to have a drink with friends without smoking. For others, they might skip a coffee break with coworkers who smoke. ¨ Change your daily routine. Take a different route to work or eat a meal in a different place. · Cut down on stress. Calm yourself or release tension by doing an activity you enjoy, such as reading a book, taking a hot bath, or gardening. · Talk to your doctor or pharmacist about nicotine replacement therapy, which replaces the nicotine in your body. You still get nicotine but you do not use tobacco. Nicotine replacement products help you slowly reduce the amount of nicotine you need. These products come in several forms, many of them available over-the-counter:  ¨ Nicotine patches  ¨ Nicotine gum and lozenges  ¨ Nicotine inhaler  · Ask your doctor about bupropion (Wellbutrin) or varenicline (Chantix), which are prescription medicines. They do not contain nicotine. They help you by reducing withdrawal symptoms, such as stress and anxiety. · Some people find hypnosis, acupuncture, and massage helpful for ending the smoking habit. · Eat a healthy diet and get regular exercise.  Having healthy habits will help your body move past its craving for nicotine. · Be prepared to keep trying. Most people are not successful the first few times they try to quit. Do not get mad at yourself if you smoke again. Make a list of things you learned and think about when you want to try again, such as next week, next month, or next year. Where can you learn more? Go to https://Kateevapejanetewyadira.[employer]. org and sign in to your [employer] account. Enter C738 in the Tigerspike box to learn more about \"Stopping Smoking: Care Instructions. \"     If you do not have an account, please click on the \"Sign Up Now\" link. Current as of: November 29, 2017  Content Version: 11.7  © 2581-0445 [employer]. Care instructions adapted under license by [facility] ([facility]). If you have questions about a medical condition or this instruction, always ask your healthcare professional. [address] any warranty or liability for your use of this information.

## 2018-09-12 NOTE — CARE COORDINATION
Jens Lorenzo MD       Future Appointments  Date Time Provider Jordon Margot   9/21/2018 10:45 AM Rehka Johnson MD [address]

## 2018-09-12 NOTE — PROGRESS NOTES
Visit Information    Have you changed or started any medications since your last visit including any over-the-counter medicines, vitamins, or herbal medicines? no   Are you having any side effects from any of your medications? -  no  Have you stopped taking any of your medications? Is so, why? -  no    Have you seen any other physician or provider since your last visit? No  Have you had any other diagnostic tests since your last visit? No  Have you been seen in the emergency room and/or had an admission to a hospital since we last saw you? No  Have you had your routine dental cleaning in the past 6 months? no    Have you activated your [facility] account? If not, what are your barriers?  Yes     Patient Care Team:  Elmer Crouch DO as PCP - General (Family Medicine)  Elmer Crouch DO as PCP - S Attributed Provider  Nicky Sagastume MD as Surgeon (Surgical Oncology)  Marleny Rangel DPM as Surgeon (Podiatry)  Doroteo Rebolledo MD as Consulting Physician (Ophthalmology)  Chaparrita Holland MD as Consulting Physician (Gastroenterology)  [facility]  Huey Millan RN as Care Coordinator  Stephenie Grey RD, LD as Dietitian  HUNTER Apodaca as     Medical History Review  Past Medical, Family, and Social History reviewed and does not contribute to the patient presenting condition    Health Maintenance   Topic Date Due    Hepatitis C screen  1955    HIV screen  10/10/1972    Shingles Vaccine (1 of 2 - 2 Dose Series) 10/10/2007    Diabetic retinal exam  03/18/2016    Colon cancer screen colonoscopy  04/26/2017    Diabetic microalbuminuria test  08/18/2017    Low dose CT lung screening  01/20/2018    Flu vaccine (1) 09/01/2018    DTaP/Tdap/Td vaccine (1 - Tdap) 10/10/2026 (Originally 10/10/1976)    A1C test (Diabetic or Prediabetic)  11/15/2018    Lipid screen  06/24/2019    Diabetic foot exam  07/10/2019    Potassium monitoring  09/07/2019    Creatinine monitoring 09/07/2019    Pneumococcal med risk  Completed

## 2018-09-13 ENCOUNTER — CARE COORDINATION (OUTPATIENT)
Dept: CARE COORDINATION | Age: 63
End: 2018-09-13

## 2018-09-14 NOTE — CARE COORDINATION
called and spoke to intake at [facility]. Per [facility] they recently discharged Nidhi Copeland about a month ago.  inquired if an aid or pain management RN was still going to his home and [facility] reports all services were discontinued.

## 2018-09-16 ASSESSMENT — ENCOUNTER SYMPTOMS
BACK PAIN: 1
STRIDOR: 0
NAUSEA: 0
COLOR CHANGE: 1
ABDOMINAL PAIN: 0
COUGH: 0
WHEEZING: 0
DIARRHEA: 0
CHEST TIGHTNESS: 0
CHOKING: 0
CONSTIPATION: 0

## 2018-09-16 NOTE — PROGRESS NOTES
days 20 tablet 0    insulin glargine (LANTUS) 100 UNIT/ML injection vial Inject 20 Units into the skin 2 times daily      blood glucose test strips (ASCENSIA AUTODISC VI;ONE TOUCH ULTRA TEST VI) strip Use with associated glucose meter. 100 strip 11    INCRUSE ELLIPTA 62.5 MCG/INH AEPB INHALE 1 PUFF INTO THE LUNGS EVERY DAY 60 each 0    budesonide (PULMICORT) 0.5 MG/2ML nebulizer suspension Take 2 mLs by nebulization 2 times daily 60 ampule 0    apixaban (ELIQUIS) 5 MG TABS tablet Take 5 mg by mouth daily       Insulin Pen Needle 32G X 4 MM MISC 1 each by Does not apply route daily 120 each 3    TRUEPLUS LANCETS 30G MISC USE AS DIRECTED 300 each 3     No current facility-administered medications for this visit. Allergies   Allergen Reactions    Gabapentin Other (See Comments)     dizziness       Health Maintenance   Topic Date Due    Hepatitis C screen  1955    HIV screen  10/10/1972    Shingles Vaccine (1 of 2 - 2 Dose Series) 10/10/2007    Diabetic retinal exam  03/18/2016    Colon cancer screen colonoscopy  04/26/2017    Diabetic microalbuminuria test  08/18/2017    Low dose CT lung screening  01/20/2018    Flu vaccine (1) 09/01/2018    DTaP/Tdap/Td vaccine (1 - Tdap) 10/10/2026 (Originally 10/10/1976)    A1C test (Diabetic or Prediabetic)  12/12/2018    Lipid screen  06/24/2019    Diabetic foot exam  07/10/2019    Potassium monitoring  09/07/2019    Creatinine monitoring  09/07/2019    Pneumococcal med risk  Completed       Subjective:      Review of Systems   Constitutional: Positive for activity change and fatigue. Negative for appetite change, chills, diaphoresis, fever and unexpected weight change. Eyes: Negative for visual disturbance. Respiratory: Negative for cough, choking, chest tightness, wheezing and stridor. Cardiovascular: Negative for chest pain, palpitations and leg swelling. Gastrointestinal: Negative for abdominal pain, constipation, diarrhea and nausea.

## 2018-09-18 ENCOUNTER — CARE COORDINATION (OUTPATIENT)
Dept: CARE COORDINATION | Age: 63
End: 2018-09-18

## 2018-09-18 NOTE — CARE COORDINATION
Nutrition Care Coordinator Follow-Up visit:    Food Recall: Eating 2-3 meals/day    Activity Level:  Sedentary: X  Lightly Active: Moderately Active:  Very Active:    Adult BMI:  Underweight (below 18.5)  Normal Weight (18.5-24. 9) X  Overweight (25-29. 9)  Obese (30-39. 9)  Morbidly Obese (>40)    Weight Change: no change noted    Plan:  Plan was established with patient:  Increase dietary fiber by consuming whole grains, fruits and vegetables: x  Limit dietary cholesterol to >200mg/day: Increase water intake:  Avoid added sugar: X  Avoid sweetened beverages: X  Choose lean meats:      Monitoring: Will monitor weight:  Will monitor adherence to meal plan: Will monitor adherence to exercise plan: Will monitor HGA1c: X    Handouts Provided :  Low Carb snacking:  Carb counting /individual meal plan:  Portion Control: X  Food Labels: X  Physical Activity:  Low Fat/Cholesterol:  Hypo/Hyperglycemia:  Calorie Controlled Meal Plan:    Goals: Increase water consumption to 8oz. 6-8 times daily:  Manage blood sugars by consuming 3 meals spaced every 4-5 hours with 2-3 snacks daily: Discussed/reviewed  Increase fiber and decrease fat intake by consuming 1-2 fruit servings and 2-3 vegetable servings per day. Increase physical activity by:  Consume less than 2,000mg of sodium/day  Avoid consumption of sweetened beverages and added sugar by reading food labels: Discussed/reviewed  Monitor blood sugars by using meter to check blood glucose before morning meal and 2 hours after a meal daily: Discussed -170 PM <200 A1c improved 9.3 on 9/12/18  Decrease risk of coronary heart disease by consuming fish that contains omega-3 fatty acids at least twice a week, avoiding partially hydrogenated oil/trans fats and limiting saturated fat intake by reading food labels:    Patient goals set:  1. Patient continues to skip breakfast most days. Goal is 3 meals daily spaced every 4-5 hours.  We reviewed easy breakfast suggestions focusing on protien sources. Will mail patient handout on quick/easy diabetic friendly breakfast meals. 2.Reviewed importance of protein source at each meal and how it relates to wound healing. Encouraged 3oz. of protein at each meal.  Low carb, high protein snack list reviewed with patient. Will continue to encouraged him to increase non-starchy vegetables, he relays he has a list of them. Patient relays no questions at this time. Will follow up with him again in 1 month.          Susan Haley

## 2018-09-19 ENCOUNTER — CARE COORDINATION (OUTPATIENT)
Dept: CARE COORDINATION | Age: 63
End: 2018-09-19

## 2018-09-19 NOTE — CARE COORDINATION
Attempting to reach patient for a follow up care coordination call regarding any needs, questions or concerns. Mailbox is full, would like to check on blood sugars and wound.    Noted patient has an appointment with wound care on 9.21.18  CC Plan:    attempt to reach patient after wound care visit

## 2018-09-21 ENCOUNTER — HOSPITAL ENCOUNTER (OUTPATIENT)
Dept: WOUND CARE | Age: 63
Discharge: HOME | End: 2018-09-21
Payer: MEDICARE

## 2018-09-21 DIAGNOSIS — L97.422 DIABETIC ULCER OF LEFT MIDFOOT ASSOCIATED WITH TYPE 2 DIABETES MELLITUS, WITH FAT LAYER EXPOSED (HCC): Primary | Chronic | ICD-10-CM

## 2018-09-21 DIAGNOSIS — E11.621 DIABETIC ULCER OF LEFT MIDFOOT ASSOCIATED WITH TYPE 2 DIABETES MELLITUS, WITH FAT LAYER EXPOSED (HCC): Primary | Chronic | ICD-10-CM

## 2018-09-24 ENCOUNTER — CARE COORDINATION (OUTPATIENT)
Dept: CARE COORDINATION | Age: 63
End: 2018-09-24

## 2018-09-24 NOTE — CARE COORDINATION
Attempting to reach patient for a follow up care coordination call regarding any needs, questions or concerns. Mailbox is full, would like to check on blood sugars and wound.    Noted patient has an appointment with wound care on 9.21.18  CC Plan:    attempt to reach patient in 1 week

## 2018-09-26 ENCOUNTER — TELEPHONE (OUTPATIENT)
Dept: FAMILY MEDICINE CLINIC | Age: 63
End: 2018-09-26

## 2018-09-26 NOTE — TELEPHONE ENCOUNTER
No he should not do the 70/30 at this time , is he getting low on the samples we have given him   He can do aspirin 325 mg but not at all as effective as blood thinner   We had given him a coupon card for free elquis , did he use that up or get that free trial as of yet.  Thanks

## 2018-09-28 ENCOUNTER — HOSPITAL ENCOUNTER (OUTPATIENT)
Dept: WOUND CARE | Age: 63
Discharge: HOME | End: 2018-09-28

## 2018-10-01 ENCOUNTER — CARE COORDINATION (OUTPATIENT)
Dept: CARE COORDINATION | Age: 63
End: 2018-10-01

## 2018-10-01 NOTE — TELEPHONE ENCOUNTER
Patient will need a new RX for eliquis to attach to the patient assistance, RX pending your approval.

## 2018-10-01 NOTE — CARE COORDINATION
Ambulatory Care Coordination Note  10/1/2018  CM Risk Score: 14  He Mortality Risk Score:      ACC: Hamp Simmonds, RN    Summary Note: Spoke with patient who denied any needs from PCP or CC at this time. Patient stated he found the coupon for Eliquis. CC will mail PA form for eliquis as well. Patient stated he will call for samples of insulin when he is low. CC called PA for insulin and they stated he did not completely fill out the forms. CC will mail the additional forms to patient. Patient stated his blood sugar is improving, he is eating healthier. Averaging around 160-180. CC Plan:   Mail forms, call patient in 1 week for needs. Diabetes Assessment    Medic Alert ID:  No  Meal Planning:  Avoidance of concentrated sweets   How often do you test your blood sugar?:  Daily   Do you have barriers with adherence to non-pharmacologic self-management interventions?  (Nutrition/Exercise/Self-Monitoring):  Yes   Have you ever had to go to the ED for symptoms of low blood sugar?:  No       Increase or Decrease trend in Blood Sugars       and   COPD Assessment    Does the patient understand envrionmental exposure?:  Yes  Does the patient have a nebulizer?:  Yes     No patient-reported symptoms         Symptoms:                 Care Coordination Interventions    Program Enrollment:  Complex Care  Referral from Primary Care Provider:  No  Suggested Interventions and [address] [place] [address] Hwy:  Completed  Medication Assistance Program:  Completed  Registered Dietician:  Completed  Social Work:  In Process  Zone Management Tools:  Completed         Goals Addressed             Most Recent     Care Coordination Self Management   Improving (10/1/2018)             CC Self Management Goal  Patient Goal (What steps will patient take to achieve goal?): monitor and report blood sugar, low carb diet  Patient is able to discuss self-management of condition(s):Diabetes  Pt demonstrates adherence to

## 2018-10-02 ENCOUNTER — CARE COORDINATION (OUTPATIENT)
Dept: CARE COORDINATION | Age: 63
End: 2018-10-02

## 2018-10-03 ENCOUNTER — CARE COORDINATION (OUTPATIENT)
Dept: CARE COORDINATION | Age: 63
End: 2018-10-03

## 2018-10-10 ENCOUNTER — CARE COORDINATION (OUTPATIENT)
Dept: CARE COORDINATION | Age: 63
End: 2018-10-10

## 2018-10-11 ENCOUNTER — PRE-PROCEDURE TELEPHONE (OUTPATIENT)
Dept: WOUND CARE | Age: 63
End: 2018-10-11

## 2018-10-17 ENCOUNTER — CARE COORDINATION (OUTPATIENT)
Dept: CARE COORDINATION | Age: 63
End: 2018-10-17

## 2018-10-18 ENCOUNTER — CARE COORDINATION (OUTPATIENT)
Dept: CARE COORDINATION | Age: 63
End: 2018-10-18

## 2018-10-19 ENCOUNTER — CARE COORDINATION (OUTPATIENT)
Dept: CARE COORDINATION | Age: 63
End: 2018-10-19

## 2018-10-19 ENCOUNTER — HOSPITAL ENCOUNTER (OUTPATIENT)
Dept: WOUND CARE | Age: 63
Discharge: HOME | End: 2018-10-19
Payer: MEDICARE

## 2018-10-19 VITALS
DIASTOLIC BLOOD PRESSURE: 68 MMHG | SYSTOLIC BLOOD PRESSURE: 128 MMHG | TEMPERATURE: 98.4 F | HEIGHT: 73 IN | HEART RATE: 88 BPM | WEIGHT: 168 LBS | BODY MASS INDEX: 22.26 KG/M2 | RESPIRATION RATE: 20 BRPM

## 2018-10-19 DIAGNOSIS — L97.422 DIABETIC ULCER OF LEFT MIDFOOT ASSOCIATED WITH TYPE 2 DIABETES MELLITUS, WITH FAT LAYER EXPOSED (HCC): Primary | Chronic | ICD-10-CM

## 2018-10-19 DIAGNOSIS — E11.621 DIABETIC ULCER OF LEFT MIDFOOT ASSOCIATED WITH TYPE 2 DIABETES MELLITUS, WITH FAT LAYER EXPOSED (HCC): Primary | Chronic | ICD-10-CM

## 2018-10-19 PROCEDURE — 99213 OFFICE O/P EST LOW 20 MIN: CPT

## 2018-10-19 PROCEDURE — 6370000000 HC RX 637 (ALT 250 FOR IP): Performed by: SURGERY

## 2018-10-19 PROCEDURE — 11042 DBRDMT SUBQ TIS 1ST 20SQCM/<: CPT

## 2018-10-19 RX ADMIN — LIDOCAINE HYDROCHLORIDE 5 ML: 20 JELLY TOPICAL at 10:51

## 2018-10-19 ASSESSMENT — PAIN SCALES - GENERAL: PAINLEVEL_OUTOF10: 7

## 2018-10-19 ASSESSMENT — PAIN DESCRIPTION - PAIN TYPE: TYPE: CHRONIC PAIN

## 2018-10-19 ASSESSMENT — PAIN DESCRIPTION - ORIENTATION: ORIENTATION: RIGHT

## 2018-10-19 NOTE — PROGRESS NOTES
Vascular Surgery   Progress Note    10/19/2018 11:11 AM  Subjective:   Admit Date: 10/19/2018  PCP: Riri Perez DO  Interval History: This is a 63 yr old male well known to me. 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 He has done well since but presents with 2 small ulcers on the anterior and posterior right knee. He is scheduled to have adjustments of his prosthesis in the next 2 weeks. He has not gotten an adjustment for his prosthesis because he cannot afford it. I had a long discussion about prevention. His previous wounds are healed and he has a granulating wound on his left lateral ankle. He says this is due to hitting his leg on a recliner and he has significant neuropathy. His children have stated that he has not worn a shoe in 4 months, only a sock. He does this because he is afraid of getting a pressure ulcer on his foot. This cannot continue in the winter and I have recommended that he follow up with his podiatrist who also does wound care.     Diet:      Medications:   Scheduled Meds:  Continuous Infusions:      Labs:       Objective:   Vitals: /68   Pulse 88   Temp 98.4 °F (36.9 °C) (Oral)   Resp 20   Ht 6' 1\" (1.854 m)   Wt 168 lb (76.2 kg)   BMI 22.16 kg/m²   General appearance: alert, cooperative and no distress  Mental Status: oriented to person, place and time with normal affect  Neck: good carotid pulses, no JVD  Lungs: clear to auscultation bilaterally, normal effort  Heart: regular rate and rhythm, no murmur,    Wound Care Documentation:  Wound 01/23/18 Foot Lateral;Left (Active)   Number of days: 269       Wound 08/03/18 [facility] #1 Left LAteral Foot DFU jones 2 FT ( 6/1/18) (Active)   Wound Type Wound 8/3/2018 10:22 AM   Wound Diabetic Jones 2 8/3/2018 10:22 AM   Dressing Changed Changed/New 8/3/2018 11:05 AM   Wound Cleansed Rinsed/Irrigated with saline 8/3/2018 10:22 AM   Wound Length (cm) 0 cm 10/19/2018 10:34 AM at BKA stump due to pressure  2. Diabetic foot ulcer    Problem List Items Addressed This Visit     Diabetic ulcer of left midfoot associated with type 2 diabetes mellitus, with fat layer exposed ([address] - Primary (Chronic)          Plan:   1)  Follow up in 3 weeks for stable left ankle wound  2)  Make appointment with his podiatrist for offloading diabetic shoe.     Electronically signed by Melita Colin MD on 10/19/2018 at 11:11 AM

## 2018-10-19 NOTE — CARE COORDINATION
Attempting to reach patient for a follow up care coordination call regarding any needs, questions or concerns. CC received message from Akanksha Muir dietician stating that patient reported to her that he is having heartburn-reflux  that is keeping him up at night, tums are not helping. CC could not leave a message, VM was full. CC Plan:   Follow up call in 3-5 days.

## 2018-10-22 ENCOUNTER — CARE COORDINATION (OUTPATIENT)
Dept: CARE COORDINATION | Age: 63
End: 2018-10-22

## 2018-10-22 NOTE — CARE COORDINATION
Attempting to reach patient for a follow up care coordination call regarding any needs, questions or concerns.     CC received message from Viviane Estes dietician stating that patient reported to her that he is having heartburn-reflux  that is keeping him up at night, tums are not helping. Also check on blood sugar. CC could not leave a message, VM was full.

## 2018-10-29 ENCOUNTER — CARE COORDINATION (OUTPATIENT)
Dept: CARE COORDINATION | Age: 63
End: 2018-10-29

## 2018-10-29 ENCOUNTER — TELEPHONE (OUTPATIENT)
Dept: FAMILY MEDICINE CLINIC | Age: 63
End: 2018-10-29

## 2018-10-29 DIAGNOSIS — E11.621 DIABETIC ULCER OF LEFT MIDFOOT ASSOCIATED WITH TYPE 2 DIABETES MELLITUS, WITH FAT LAYER EXPOSED (HCC): Chronic | ICD-10-CM

## 2018-10-29 DIAGNOSIS — Z79.4 TYPE 2 DIABETES MELLITUS WITH DIABETIC POLYNEUROPATHY, WITH LONG-TERM CURRENT USE OF INSULIN (HCC): Primary | ICD-10-CM

## 2018-10-29 DIAGNOSIS — L97.422 DIABETIC ULCER OF LEFT MIDFOOT ASSOCIATED WITH TYPE 2 DIABETES MELLITUS, WITH FAT LAYER EXPOSED (HCC): Chronic | ICD-10-CM

## 2018-10-29 DIAGNOSIS — E11.42 TYPE 2 DIABETES MELLITUS WITH DIABETIC POLYNEUROPATHY, WITH LONG-TERM CURRENT USE OF INSULIN (HCC): Primary | ICD-10-CM

## 2018-10-29 DIAGNOSIS — E11.42 DIABETIC POLYNEUROPATHY ASSOCIATED WITH TYPE 2 DIABETES MELLITUS (HCC): ICD-10-CM

## 2018-10-29 NOTE — TELEPHONE ENCOUNTER
Patient requesting an order for a diabetic shoe. Order pending your signature or denial. Please advise.

## 2018-10-29 NOTE — CARE COORDINATION
actions to take should they arise  Identify problems that require immediate PCP or specialist visit  Patient demonstrates understanding of access to PCP/Specialist:  Understands about scheduling routine Follow Up appointments   Understands about sick day appointment options for worsening of symptoms/progression (Same Day, E Visits)       Conditions and Symptoms   No change (10/29/2018)             I will schedule office visits, as directed by my provider. I will keep my appointment or reschedule if I have to cancel. I will notify my provider of any symptoms that indicate a worsening of my condition. Barriers: none  Plan for overcoming my barriers: N/A  Confidence: 6/10  Anticipated Goal Completion Date: 5/8/18         Nutrition Plan   No change (10/29/2018)             I will follow a nutritional plan as directed   Calorie Controlled:   2000 Calories carb consistent    Barriers: lack of education  Plan for overcoming my barriers: dietitian to educate on carbohydrate counting, reading food labels, portion control  Confidence: 6/10  Anticipated Goal Completion Date: 11/30/2018            Prior to Admission medications    Medication Sig Start Date End Date Taking? Authorizing Provider   apixaban (ELIQUIS) 5 MG TABS tablet Take 1 tablet by mouth daily 10/1/18   Omi Lyn, DO   Insulin Degludec (TRESIBA FLEXTOUCH) 100 UNIT/ML SOPN Inject 76 Units into the skin nightly    Historical Provider, MD   doxycycline hyclate (VIBRA-TABS) 100 MG tablet Take 1 tablet by mouth 2 times daily 9/7/18   Leora Louis, APRN - CNP   insulin glargine (LANTUS) 100 UNIT/ML injection vial Inject 20 Units into the skin 2 times daily    Historical Provider, MD   blood glucose test strips (ASCENSIA AUTODISC VI;ONE TOUCH ULTRA TEST VI) strip Use with associated glucose meter.  8/6/18   Geetha Blanca DO   INCRUSE ELLIPTA 62.5 MCG/INH AEPB INHALE 1 PUFF INTO THE LUNGS EVERY DAY 5/7/18   Omi Lyn DO   budesonide (PULMICORT) 0.5 MG/2ML

## 2018-10-30 ENCOUNTER — TELEPHONE (OUTPATIENT)
Dept: FAMILY MEDICINE CLINIC | Age: 63
End: 2018-10-30

## 2018-10-31 ENCOUNTER — CARE COORDINATION (OUTPATIENT)
Dept: INTERNAL MEDICINE CLINIC | Age: 63
End: 2018-10-31

## 2018-10-31 NOTE — CARE COORDINATION
CC left message requesting a return call to verify where he would like his diabetic shoe order sent.

## 2018-11-06 ENCOUNTER — CARE COORDINATION (OUTPATIENT)
Dept: CARE COORDINATION | Age: 63
End: 2018-11-06

## 2018-11-07 ENCOUNTER — TELEPHONE (OUTPATIENT)
Dept: FAMILY MEDICINE CLINIC | Age: 63
End: 2018-11-07

## 2018-11-07 ENCOUNTER — CARE COORDINATION (OUTPATIENT)
Dept: CARE COORDINATION | Age: 63
End: 2018-11-07

## 2018-11-13 ENCOUNTER — TELEPHONE (OUTPATIENT)
Dept: FAMILY MEDICINE CLINIC | Age: 63
End: 2018-11-13

## 2018-11-14 ENCOUNTER — CARE COORDINATION (OUTPATIENT)
Dept: CARE COORDINATION | Age: 63
End: 2018-11-14

## 2018-11-16 ENCOUNTER — CARE COORDINATION (OUTPATIENT)
Dept: CARE COORDINATION | Age: 63
End: 2018-11-16

## 2018-11-19 ENCOUNTER — CARE COORDINATION (OUTPATIENT)
Dept: CARE COORDINATION | Age: 63
End: 2018-11-19

## 2018-11-20 ENCOUNTER — CARE COORDINATION (OUTPATIENT)
Dept: CARE COORDINATION | Age: 63
End: 2018-11-20

## 2018-11-23 ENCOUNTER — HOSPITAL ENCOUNTER (OUTPATIENT)
Dept: WOUND CARE | Age: 63
Discharge: HOME | End: 2018-11-23

## 2018-11-23 DIAGNOSIS — T87.9 COMPLICATION OF BELOW KNEE AMPUTATION STUMP (HCC): ICD-10-CM

## 2018-11-23 DIAGNOSIS — L97.422 DIABETIC ULCER OF LEFT MIDFOOT ASSOCIATED WITH TYPE 2 DIABETES MELLITUS, WITH FAT LAYER EXPOSED (HCC): Primary | Chronic | ICD-10-CM

## 2018-11-23 DIAGNOSIS — E11.621 DIABETIC ULCER OF LEFT MIDFOOT ASSOCIATED WITH TYPE 2 DIABETES MELLITUS, WITH FAT LAYER EXPOSED (HCC): Primary | Chronic | ICD-10-CM

## 2018-11-27 ENCOUNTER — TELEPHONE (OUTPATIENT)
Dept: FAMILY MEDICINE CLINIC | Age: 63
End: 2018-11-27

## 2018-11-27 NOTE — TELEPHONE ENCOUNTER
Advised pt that he could come in and get on sample box of insulin but he needs to keep his appointment for tomorrow. Please as pt what insulin he is on. Per last office note he was given samples  Of humalog and basaglar.

## 2018-11-28 ENCOUNTER — TELEPHONE (OUTPATIENT)
Dept: FAMILY MEDICINE CLINIC | Age: 63
End: 2018-11-28

## 2018-11-29 ENCOUNTER — CARE COORDINATION (OUTPATIENT)
Dept: CARE COORDINATION | Age: 63
End: 2018-11-29

## 2018-11-30 ENCOUNTER — HOSPITAL ENCOUNTER (OUTPATIENT)
Dept: WOUND CARE | Age: 63
Discharge: HOME | End: 2018-11-30

## 2018-11-30 DIAGNOSIS — L97.422 DIABETIC ULCER OF LEFT MIDFOOT ASSOCIATED WITH TYPE 2 DIABETES MELLITUS, WITH FAT LAYER EXPOSED (HCC): Primary | Chronic | ICD-10-CM

## 2018-11-30 DIAGNOSIS — E11.621 DIABETIC ULCER OF LEFT MIDFOOT ASSOCIATED WITH TYPE 2 DIABETES MELLITUS, WITH FAT LAYER EXPOSED (HCC): Primary | Chronic | ICD-10-CM

## 2018-12-03 ENCOUNTER — CARE COORDINATION (OUTPATIENT)
Dept: CARE COORDINATION | Age: 63
End: 2018-12-03

## 2018-12-03 NOTE — CARE COORDINATION
called and spoke to Janie. Janie reports that he did call [employer] and they mailed him a packet of information with recommendations and how to enroll in part D. Janie reports that he has not had time to complete forms or look it over.  encouraged Cammyryanne to try and review information which would allow him more time to call if he has questions concerns. Janie denied any need for resources or questions. Plan:     will call in 2-3 weeks to follow up on Medicare Part D forms.

## 2018-12-06 ENCOUNTER — TELEPHONE (OUTPATIENT)
Dept: FAMILY MEDICINE CLINIC | Age: 63
End: 2018-12-06

## 2018-12-13 ENCOUNTER — TELEPHONE (OUTPATIENT)
Dept: FAMILY MEDICINE CLINIC | Age: 63
End: 2018-12-13

## 2018-12-17 ENCOUNTER — CARE COORDINATION (OUTPATIENT)
Dept: CARE COORDINATION | Age: 63
End: 2018-12-17

## 2018-12-17 NOTE — CARE COORDINATION
symptoms/progression (Same Day, E Visits)       Conditions and Symptoms   No change (12/17/2018)             I will schedule office visits, as directed by my provider. I will keep my appointment or reschedule if I have to cancel. I will notify my provider of any symptoms that indicate a worsening of my condition. Barriers: none  Plan for overcoming my barriers: N/A  Confidence: 6/10  Anticipated Goal Completion Date: 5/8/18         Nutrition Plan   No change (12/17/2018)             I will work on limiting portions of foods that contain carbohydrate an increase non-starchy vegetable intake    Barriers: lack of education  Plan for overcoming my barriers: cc dietitian to continue to educate on portion control with carbohydrates  Confidence: 5/10  Anticipated Goal Completion Date: 3/2019            Prior to Admission medications    Medication Sig Start Date End Date Taking? Authorizing Provider   apixaban (ELIQUIS) 5 MG TABS tablet Take 1 tablet by mouth daily 11/13/18   Edilia Pyle DO   Insulin Degludec (TRESIBA FLEXTOUCH) 100 UNIT/ML SOPN Inject 76 Units into the skin nightly    Historical Provider, MD   doxycycline hyclate (VIBRA-TABS) 100 MG tablet Take 1 tablet by mouth 2 times daily 9/7/18   Leora DIXIE Mackenziep, APRN - CNP   insulin glargine (LANTUS) 100 UNIT/ML injection vial Inject 20 Units into the skin 2 times daily    Historical Provider, MD   blood glucose test strips (ASCENSIA AUTODISC VI;ONE TOUCH ULTRA TEST VI) strip Use with associated glucose meter.  8/6/18   Geetha Blanca DO   INCRUSE ELLIPTA 62.5 MCG/INH AEPB INHALE 1 PUFF INTO THE LUNGS EVERY DAY 5/7/18   Edilia Pyle DO   budesonide (PULMICORT) 0.5 MG/2ML nebulizer suspension Take 2 mLs by nebulization 2 times daily 1/23/18   Laya Espana MD   Insulin Pen Needle 32G X 4 MM MISC 1 each by Does not apply route daily 3/17/17   Lucy Noble MD   TRUEPLUS LANCETS 30G MISC USE AS DIRECTED 7/21/16   Lucy Noble MD       No future

## 2018-12-17 NOTE — CARE COORDINATION
Nutrition Care Coordinator Follow-Up visit:    Food Recall: eating 2 meals/day    Activity Level:  Sedentary: X  Lightly Active: Moderately Active:  Very Active:    Adult BMI:  Underweight (below 18.5)  Normal Weight (18.5-24. 9) X  Overweight (25-29. 9)  Obese (30-39. 9)  Morbidly Obese (>40)    Weight Change: no recent change    Plan:  Plan was established with patient:  Increase dietary fiber by consuming whole grains, fruits and vegetables: X  Limit dietary cholesterol to >200mg/day: Increase water intake:  Avoid added sugar: X  Avoid sweetened beverages: X  Choose lean meats:      Monitoring: Will monitor weight:  Will monitor adherence to meal plan: Will monitor adherence to exercise plan: Will monitor HGA1c: X    Handouts Provided :  Low Carb snacking: X  Carb counting /individual meal plan:  Portion Control: X  Food Labels: X  Physical Activity:  Low Fat/Cholesterol:  Hypo/Hyperglycemia:  Calorie Controlled Meal Plan:    Goals: Increase water consumption to 8oz. 6-8 times daily:  Manage blood sugars by consuming 3 meals spaced every 4-5 hours with 2-3 snacks daily: Discussed  Increase fiber and decrease fat intake by consuming 1-2 fruit servings and 2-3 vegetable servings per day. Increase physical activity by:  Consume less than 2,000mg of sodium/day  Avoid consumption of sweetened beverages and added sugar by reading food labels: Discussed  Monitor blood sugars by using meter to check blood glucose before morning meal and 2 hours after a meal daily: not checking currently- encouraged to start  Decrease risk of coronary heart disease by consuming fish that contains omega-3 fatty acids at least twice a week, avoiding partially hydrogenated oil/trans fats and limiting saturated fat intake by reading food labels: Discussed    Patient goals set:  1. Patient admits he is still skipping meals. Reviewed timing and spacing of meals and reasons why it is important to be consistent.   Goal is 3 meals daily

## 2018-12-18 ENCOUNTER — CARE COORDINATION (OUTPATIENT)
Dept: CARE COORDINATION | Age: 63
End: 2018-12-18

## 2018-12-19 ENCOUNTER — CARE COORDINATION (OUTPATIENT)
Dept: CARE COORDINATION | Age: 63
End: 2018-12-19

## 2018-12-20 ENCOUNTER — HOSPITAL ENCOUNTER (EMERGENCY)
Age: 63
Discharge: HOME | End: 2018-12-20
Attending: EMERGENCY MEDICINE
Payer: MEDICARE

## 2018-12-20 VITALS
HEART RATE: 87 BPM | HEIGHT: 73 IN | OXYGEN SATURATION: 96 % | BODY MASS INDEX: 22.53 KG/M2 | SYSTOLIC BLOOD PRESSURE: 155 MMHG | TEMPERATURE: 97.8 F | DIASTOLIC BLOOD PRESSURE: 72 MMHG | RESPIRATION RATE: 24 BRPM | WEIGHT: 170 LBS

## 2018-12-20 DIAGNOSIS — R42 DIZZINESS: Primary | ICD-10-CM

## 2018-12-20 LAB
ABSOLUTE EOS #: 0.2 K/UL (ref 0–0.4)
ABSOLUTE IMMATURE GRANULOCYTE: ABNORMAL K/UL (ref 0–0.3)
ABSOLUTE LYMPH #: 2.2 K/UL (ref 1–4.8)
ABSOLUTE MONO #: 0.6 K/UL (ref 0.2–0.8)
ANION GAP SERPL CALCULATED.3IONS-SCNC: 11 MMOL/L (ref 9–17)
BASOPHILS # BLD: 0 % (ref 0–2)
BASOPHILS ABSOLUTE: 0 K/UL (ref 0–0.2)
BUN BLDV-MCNC: 18 MG/DL (ref 8–23)
BUN/CREAT BLD: 24 (ref 9–20)
CALCIUM SERPL-MCNC: 8.9 MG/DL (ref 8.6–10.4)
CHLORIDE BLD-SCNC: 97 MMOL/L (ref 98–107)
CO2: 24 MMOL/L (ref 20–31)
CREAT SERPL-MCNC: 0.76 MG/DL (ref 0.7–1.2)
DIFFERENTIAL TYPE: ABNORMAL
EKG ATRIAL RATE: 85 BPM
EKG P AXIS: 39 DEGREES
EKG P-R INTERVAL: 126 MS
EKG Q-T INTERVAL: 358 MS
EKG QRS DURATION: 78 MS
EKG QTC CALCULATION (BAZETT): 426 MS
EKG R AXIS: 33 DEGREES
EKG T AXIS: 26 DEGREES
EKG VENTRICULAR RATE: 85 BPM
EOSINOPHILS RELATIVE PERCENT: 2 % (ref 1–4)
GFR AFRICAN AMERICAN: >60 ML/MIN
GFR NON-AFRICAN AMERICAN: >60 ML/MIN
GFR SERPL CREATININE-BSD FRML MDRD: ABNORMAL ML/MIN/{1.73_M2}
GFR SERPL CREATININE-BSD FRML MDRD: ABNORMAL ML/MIN/{1.73_M2}
GLUCOSE BLD-MCNC: 263 MG/DL (ref 70–99)
HCT VFR BLD CALC: 40.9 % (ref 41–53)
HEMOGLOBIN: 13.8 G/DL (ref 13.5–17.5)
IMMATURE GRANULOCYTES: ABNORMAL %
LYMPHOCYTES # BLD: 19 % (ref 24–44)
MCH RBC QN AUTO: 28.7 PG (ref 26–34)
MCHC RBC AUTO-ENTMCNC: 33.6 G/DL (ref 31–37)
MCV RBC AUTO: 85.3 FL (ref 80–100)
MONOCYTES # BLD: 6 % (ref 1–7)
NRBC AUTOMATED: ABNORMAL PER 100 WBC
PDW BLD-RTO: 14.4 % (ref 11.5–14.5)
PLATELET # BLD: 423 K/UL (ref 130–400)
PLATELET ESTIMATE: ABNORMAL
PMV BLD AUTO: 7.6 FL (ref 6–12)
POTASSIUM SERPL-SCNC: 4.2 MMOL/L (ref 3.7–5.3)
RBC # BLD: 4.8 M/UL (ref 4.5–5.9)
RBC # BLD: ABNORMAL 10*6/UL
SEG NEUTROPHILS: 73 % (ref 36–66)
SEGMENTED NEUTROPHILS ABSOLUTE COUNT: 8.3 K/UL (ref 1.8–7.7)
SODIUM BLD-SCNC: 132 MMOL/L (ref 135–144)
WBC # BLD: 11.4 K/UL (ref 3.5–11)
WBC # BLD: ABNORMAL 10*3/UL

## 2018-12-20 PROCEDURE — 99284 EMERGENCY DEPT VISIT MOD MDM: CPT

## 2018-12-20 PROCEDURE — 93005 ELECTROCARDIOGRAM TRACING: CPT

## 2018-12-20 PROCEDURE — 85025 COMPLETE CBC W/AUTO DIFF WBC: CPT

## 2018-12-20 PROCEDURE — 80048 BASIC METABOLIC PNL TOTAL CA: CPT

## 2018-12-20 PROCEDURE — 2580000003 HC RX 258: Performed by: EMERGENCY MEDICINE

## 2018-12-20 RX ORDER — 0.9 % SODIUM CHLORIDE 0.9 %
1000 INTRAVENOUS SOLUTION INTRAVENOUS ONCE
Status: COMPLETED | OUTPATIENT
Start: 2018-12-20 | End: 2018-12-20

## 2018-12-20 RX ADMIN — SODIUM CHLORIDE 1000 ML: 9 INJECTION, SOLUTION INTRAVENOUS at 16:23

## 2018-12-20 ASSESSMENT — ENCOUNTER SYMPTOMS
FACIAL SWELLING: 0
CONSTIPATION: 0
ABDOMINAL PAIN: 0
COUGH: 0
DIARRHEA: 1
EYE DISCHARGE: 0
VOMITING: 1
NAUSEA: 1
SHORTNESS OF BREATH: 0
COLOR CHANGE: 0
EYE REDNESS: 0

## 2018-12-20 ASSESSMENT — PAIN SCALES - GENERAL: PAINLEVEL_OUTOF10: 8

## 2018-12-20 ASSESSMENT — PAIN DESCRIPTION - PAIN TYPE: TYPE: CHRONIC PAIN

## 2018-12-20 ASSESSMENT — PAIN DESCRIPTION - LOCATION: LOCATION: LEG

## 2018-12-20 ASSESSMENT — PAIN DESCRIPTION - ORIENTATION: ORIENTATION: LEFT

## 2018-12-21 ENCOUNTER — CARE COORDINATION (OUTPATIENT)
Dept: CARE COORDINATION | Age: 63
End: 2018-12-21

## 2018-12-24 ENCOUNTER — CARE COORDINATION (OUTPATIENT)
Dept: CARE COORDINATION | Age: 63
End: 2018-12-24

## 2018-12-24 NOTE — CARE COORDINATION
CC attempting to reach patient for a follow up from ED. Patient was seen in ED on 12.20.18 for dizziness. Patient's mailbox is full and not accepting messages. Patient asked that CC does not call his family any longer during our last conversation.

## 2018-12-27 ENCOUNTER — CARE COORDINATION (OUTPATIENT)
Dept: CARE COORDINATION | Age: 63
End: 2018-12-27

## 2019-01-02 ENCOUNTER — CARE COORDINATION (OUTPATIENT)
Dept: CARE COORDINATION | Age: 64
End: 2019-01-02

## 2019-01-07 ENCOUNTER — CARE COORDINATION (OUTPATIENT)
Dept: CARE COORDINATION | Age: 64
End: 2019-01-07

## 2019-01-08 ENCOUNTER — CARE COORDINATION (OUTPATIENT)
Dept: CARE COORDINATION | Age: 64
End: 2019-01-08

## 2019-01-10 ENCOUNTER — OFFICE VISIT (OUTPATIENT)
Dept: FAMILY MEDICINE CLINIC | Age: 64
End: 2019-01-10
Payer: MEDICARE

## 2019-01-10 VITALS
RESPIRATION RATE: 18 BRPM | DIASTOLIC BLOOD PRESSURE: 68 MMHG | OXYGEN SATURATION: 98 % | WEIGHT: 180 LBS | BODY MASS INDEX: 23.86 KG/M2 | SYSTOLIC BLOOD PRESSURE: 136 MMHG | HEIGHT: 73 IN | TEMPERATURE: 96.6 F | HEART RATE: 84 BPM

## 2019-01-10 DIAGNOSIS — Z91.199 NONCOMPLIANCE: ICD-10-CM

## 2019-01-10 DIAGNOSIS — L97.422 DIABETIC ULCER OF LEFT MIDFOOT ASSOCIATED WITH TYPE 2 DIABETES MELLITUS, WITH FAT LAYER EXPOSED (HCC): ICD-10-CM

## 2019-01-10 DIAGNOSIS — Z89.619: ICD-10-CM

## 2019-01-10 DIAGNOSIS — E11.42 DIABETIC POLYNEUROPATHY ASSOCIATED WITH TYPE 2 DIABETES MELLITUS (HCC): ICD-10-CM

## 2019-01-10 DIAGNOSIS — I73.9 PAD (PERIPHERAL ARTERY DISEASE) (HCC): ICD-10-CM

## 2019-01-10 DIAGNOSIS — Z79.4 TYPE 2 DIABETES MELLITUS WITH DIABETIC POLYNEUROPATHY, WITH LONG-TERM CURRENT USE OF INSULIN (HCC): Primary | ICD-10-CM

## 2019-01-10 DIAGNOSIS — E11.42 TYPE 2 DIABETES MELLITUS WITH DIABETIC POLYNEUROPATHY, WITH LONG-TERM CURRENT USE OF INSULIN (HCC): Primary | ICD-10-CM

## 2019-01-10 DIAGNOSIS — I65.23 CAROTID STENOSIS, ASYMPTOMATIC, BILATERAL: Chronic | ICD-10-CM

## 2019-01-10 DIAGNOSIS — E11.42 DM TYPE 2 WITH DIABETIC PERIPHERAL NEUROPATHY (HCC): ICD-10-CM

## 2019-01-10 DIAGNOSIS — L84 CALLUS: ICD-10-CM

## 2019-01-10 DIAGNOSIS — M79.2 NEUROPATHIC PAIN OF RIGHT LOWER EXTREMITY: ICD-10-CM

## 2019-01-10 DIAGNOSIS — E11.621 DIABETIC ULCER OF LEFT MIDFOOT ASSOCIATED WITH TYPE 2 DIABETES MELLITUS, WITH FAT LAYER EXPOSED (HCC): ICD-10-CM

## 2019-01-10 DIAGNOSIS — I73.9 PVD (PERIPHERAL VASCULAR DISEASE) (HCC): ICD-10-CM

## 2019-01-10 DIAGNOSIS — J44.9 CHRONIC OBSTRUCTIVE PULMONARY DISEASE, UNSPECIFIED COPD TYPE (HCC): ICD-10-CM

## 2019-01-10 DIAGNOSIS — J43.9 PULMONARY EMPHYSEMA, UNSPECIFIED EMPHYSEMA TYPE (HCC): ICD-10-CM

## 2019-01-10 LAB
CREATININE URINE POCT: 200
HBA1C MFR BLD: 11.4 %
MICROALBUMIN/CREAT 24H UR: 150 MG/G{CREAT}
MICROALBUMIN/CREAT UR-RTO: ABNORMAL

## 2019-01-10 PROCEDURE — 82044 UR ALBUMIN SEMIQUANTITATIVE: CPT | Performed by: INTERNAL MEDICINE

## 2019-01-10 PROCEDURE — G8484 FLU IMMUNIZE NO ADMIN: HCPCS | Performed by: INTERNAL MEDICINE

## 2019-01-10 PROCEDURE — G8427 DOCREV CUR MEDS BY ELIG CLIN: HCPCS | Performed by: INTERNAL MEDICINE

## 2019-01-10 PROCEDURE — G8420 CALC BMI NORM PARAMETERS: HCPCS | Performed by: INTERNAL MEDICINE

## 2019-01-10 PROCEDURE — G8926 SPIRO NO PERF OR DOC: HCPCS | Performed by: INTERNAL MEDICINE

## 2019-01-10 PROCEDURE — G8598 ASA/ANTIPLAT THER USED: HCPCS | Performed by: INTERNAL MEDICINE

## 2019-01-10 PROCEDURE — 2022F DILAT RTA XM EVC RTNOPTHY: CPT | Performed by: INTERNAL MEDICINE

## 2019-01-10 PROCEDURE — 3017F COLORECTAL CA SCREEN DOC REV: CPT | Performed by: INTERNAL MEDICINE

## 2019-01-10 PROCEDURE — 4004F PT TOBACCO SCREEN RCVD TLK: CPT | Performed by: INTERNAL MEDICINE

## 2019-01-10 PROCEDURE — 83036 HEMOGLOBIN GLYCOSYLATED A1C: CPT | Performed by: INTERNAL MEDICINE

## 2019-01-10 PROCEDURE — 3023F SPIROM DOC REV: CPT | Performed by: INTERNAL MEDICINE

## 2019-01-10 PROCEDURE — 99214 OFFICE O/P EST MOD 30 MIN: CPT | Performed by: INTERNAL MEDICINE

## 2019-01-10 PROCEDURE — 3046F HEMOGLOBIN A1C LEVEL >9.0%: CPT | Performed by: INTERNAL MEDICINE

## 2019-01-10 RX ORDER — TRAZODONE HYDROCHLORIDE 100 MG/1
100 TABLET ORAL NIGHTLY
Qty: 30 TABLET | Refills: 5 | Status: SHIPPED | OUTPATIENT
Start: 2019-01-10 | End: 2019-04-23 | Stop reason: SDUPTHER

## 2019-01-11 ENCOUNTER — CARE COORDINATION (OUTPATIENT)
Dept: CARE COORDINATION | Age: 64
End: 2019-01-11

## 2019-01-13 ASSESSMENT — ENCOUNTER SYMPTOMS
VISUAL CHANGE: 0
ABDOMINAL PAIN: 0
CHEST TIGHTNESS: 0
CHANGE IN BOWEL HABIT: 0
BLURRED VISION: 0
WHEEZING: 0
SWOLLEN GLANDS: 0
COUGH: 0
BLOOD IN STOOL: 0
STRIDOR: 0
CHOKING: 0
VOMITING: 0
SHORTNESS OF BREATH: 0
CONSTIPATION: 0
SORE THROAT: 0
NAUSEA: 0

## 2019-01-14 ENCOUNTER — CARE COORDINATION (OUTPATIENT)
Dept: CARE COORDINATION | Age: 64
End: 2019-01-14

## 2019-01-14 DIAGNOSIS — E11.42 TYPE 2 DIABETES MELLITUS WITH DIABETIC POLYNEUROPATHY, WITH LONG-TERM CURRENT USE OF INSULIN (HCC): ICD-10-CM

## 2019-01-14 DIAGNOSIS — Z79.4 TYPE 2 DIABETES MELLITUS WITH DIABETIC POLYNEUROPATHY, WITH LONG-TERM CURRENT USE OF INSULIN (HCC): ICD-10-CM

## 2019-01-14 RX ORDER — INSULIN GLARGINE 100 [IU]/ML
20 INJECTION, SOLUTION SUBCUTANEOUS 2 TIMES DAILY
Qty: 1 VIAL | Refills: 3 | Status: ON HOLD | OUTPATIENT
Start: 2019-01-14 | End: 2020-02-24

## 2019-01-16 ENCOUNTER — CARE COORDINATION (OUTPATIENT)
Dept: CARE COORDINATION | Age: 64
End: 2019-01-16

## 2019-01-29 ENCOUNTER — CARE COORDINATION (OUTPATIENT)
Dept: CARE COORDINATION | Age: 64
End: 2019-01-29

## 2019-02-07 ENCOUNTER — CARE COORDINATION (OUTPATIENT)
Dept: CARE COORDINATION | Age: 64
End: 2019-02-07

## 2019-02-08 ENCOUNTER — CARE COORDINATION (OUTPATIENT)
Dept: CARE COORDINATION | Age: 64
End: 2019-02-08

## 2019-02-12 ENCOUNTER — CARE COORDINATION (OUTPATIENT)
Dept: CARE COORDINATION | Age: 64
End: 2019-02-12

## 2019-02-18 ENCOUNTER — CARE COORDINATION (OUTPATIENT)
Dept: CARE COORDINATION | Age: 64
End: 2019-02-18

## 2019-02-25 ENCOUNTER — CARE COORDINATION (OUTPATIENT)
Dept: CARE COORDINATION | Age: 64
End: 2019-02-25

## 2019-03-04 ENCOUNTER — CARE COORDINATION (OUTPATIENT)
Dept: CARE COORDINATION | Age: 64
End: 2019-03-04

## 2019-03-05 ENCOUNTER — TELEPHONE (OUTPATIENT)
Dept: FAMILY MEDICINE CLINIC | Age: 64
End: 2019-03-05

## 2019-03-05 ENCOUNTER — CARE COORDINATION (OUTPATIENT)
Dept: CARE COORDINATION | Age: 64
End: 2019-03-05

## 2019-03-05 NOTE — TELEPHONE ENCOUNTER
Patient would like to know if we have any long acting  Insulin samples  Please call him back  [phone]

## 2019-03-05 NOTE — TELEPHONE ENCOUNTER
Called [facility] and they stated that pt does not have active ins. We need active ins card scanned into the chart. We will not be able to summit prior authorization until this is done. I tried to contact the pt and his mail box was full at this time and could not receive messages at this time.

## 2019-03-13 ENCOUNTER — CARE COORDINATION (OUTPATIENT)
Dept: CARE COORDINATION | Age: 64
End: 2019-03-13

## 2019-03-22 ENCOUNTER — CARE COORDINATION (OUTPATIENT)
Dept: CARE COORDINATION | Age: 64
End: 2019-03-22

## 2019-03-22 DIAGNOSIS — E11.42 TYPE 2 DIABETES MELLITUS WITH DIABETIC POLYNEUROPATHY, WITH LONG-TERM CURRENT USE OF INSULIN (HCC): Primary | ICD-10-CM

## 2019-03-22 DIAGNOSIS — Z79.4 TYPE 2 DIABETES MELLITUS WITH DIABETIC POLYNEUROPATHY, WITH LONG-TERM CURRENT USE OF INSULIN (HCC): Primary | ICD-10-CM

## 2019-03-25 ENCOUNTER — CARE COORDINATION (OUTPATIENT)
Dept: CARE COORDINATION | Age: 64
End: 2019-03-25

## 2019-03-25 ENCOUNTER — TELEPHONE (OUTPATIENT)
Dept: PHARMACY | Age: 64
End: 2019-03-25

## 2019-03-28 ENCOUNTER — CARE COORDINATION (OUTPATIENT)
Dept: CARE COORDINATION | Age: 64
End: 2019-03-28

## 2019-03-29 NOTE — TELEPHONE ENCOUNTER
Pt is almost out of samples and stated he received tudjeo last week and I check and we are out I was wondering if he could have something else.

## 2019-04-01 ENCOUNTER — CARE COORDINATION (OUTPATIENT)
Dept: CARE COORDINATION | Age: 64
End: 2019-04-01

## 2019-04-01 NOTE — CARE COORDINATION
Attempting to reach patient for a follow up care coordination call regarding any needs, questions or concerns. Sena Hammer, [address] [facility] is full, would like to verify patient received the paperwork for patient assistance for his insulin.

## 2019-04-02 ENCOUNTER — CARE COORDINATION (OUTPATIENT)
Dept: CARE COORDINATION | Age: 64
End: 2019-04-02

## 2019-04-02 NOTE — CARE COORDINATION
Patient returned CC call stating he did get the PA paperwork for Lantus, he will bring it to his appointment on Monday for CC to help him complete.

## 2019-04-08 ENCOUNTER — CARE COORDINATION (OUTPATIENT)
Dept: CARE COORDINATION | Age: 64
End: 2019-04-08

## 2019-04-08 NOTE — CARE COORDINATION
CC called patient with an appointment reminder, he requested to reschedule. Per patient he has to go get his drivers license. CC rescheduled and verified patient did not need anything additional at this time. Patient stated he will have his niece help him with his part of the PA forms for his insulin.   CC Plan:  Follow up with patient at upcoming PCP appointment

## 2019-04-22 ENCOUNTER — CARE COORDINATION (OUTPATIENT)
Dept: CARE COORDINATION | Age: 64
End: 2019-04-22

## 2019-04-22 NOTE — CARE COORDINATION
CC called patient to remind him of his PCP appointment today, he asked to rescedule for tomorrow. He still plans to speak with her about a CGM and  insulin samples. CC Plan:   Follow up with patient after appointment.

## 2019-04-23 ENCOUNTER — OFFICE VISIT (OUTPATIENT)
Dept: FAMILY MEDICINE CLINIC | Age: 64
End: 2019-04-23
Payer: MEDICARE

## 2019-04-23 VITALS
HEIGHT: 73 IN | BODY MASS INDEX: 22.08 KG/M2 | OXYGEN SATURATION: 96 % | DIASTOLIC BLOOD PRESSURE: 80 MMHG | RESPIRATION RATE: 20 BRPM | HEART RATE: 101 BPM | TEMPERATURE: 97.5 F | SYSTOLIC BLOOD PRESSURE: 138 MMHG | WEIGHT: 166.6 LBS

## 2019-04-23 DIAGNOSIS — E11.42 DIABETIC POLYNEUROPATHY ASSOCIATED WITH TYPE 2 DIABETES MELLITUS (HCC): ICD-10-CM

## 2019-04-23 DIAGNOSIS — Z12.11 SCREENING FOR COLON CANCER: ICD-10-CM

## 2019-04-23 DIAGNOSIS — N40.1 BENIGN PROSTATIC HYPERPLASIA WITH NOCTURIA: ICD-10-CM

## 2019-04-23 DIAGNOSIS — Z79.4 TYPE 2 DIABETES MELLITUS WITH DIABETIC POLYNEUROPATHY, WITH LONG-TERM CURRENT USE OF INSULIN (HCC): ICD-10-CM

## 2019-04-23 DIAGNOSIS — F17.218 CIGARETTE NICOTINE DEPENDENCE WITH OTHER NICOTINE-INDUCED DISORDER: ICD-10-CM

## 2019-04-23 DIAGNOSIS — E11.42 TYPE 2 DIABETES MELLITUS WITH DIABETIC POLYNEUROPATHY, WITH LONG-TERM CURRENT USE OF INSULIN (HCC): Primary | ICD-10-CM

## 2019-04-23 DIAGNOSIS — E11.42 TYPE 2 DIABETES MELLITUS WITH DIABETIC POLYNEUROPATHY, WITH LONG-TERM CURRENT USE OF INSULIN (HCC): ICD-10-CM

## 2019-04-23 DIAGNOSIS — R35.1 BENIGN PROSTATIC HYPERPLASIA WITH NOCTURIA: ICD-10-CM

## 2019-04-23 DIAGNOSIS — I73.9 PVD (PERIPHERAL VASCULAR DISEASE) (HCC): ICD-10-CM

## 2019-04-23 DIAGNOSIS — Z91.199 NONCOMPLIANCE: ICD-10-CM

## 2019-04-23 DIAGNOSIS — M79.2 NEUROPATHIC PAIN OF RIGHT LOWER EXTREMITY: ICD-10-CM

## 2019-04-23 DIAGNOSIS — Z79.4 TYPE 2 DIABETES MELLITUS WITH DIABETIC POLYNEUROPATHY, WITH LONG-TERM CURRENT USE OF INSULIN (HCC): Primary | ICD-10-CM

## 2019-04-23 DIAGNOSIS — Z71.6 TOBACCO ABUSE COUNSELING: ICD-10-CM

## 2019-04-23 LAB — HBA1C MFR BLD: 11.8 %

## 2019-04-23 PROCEDURE — 3017F COLORECTAL CA SCREEN DOC REV: CPT | Performed by: INTERNAL MEDICINE

## 2019-04-23 PROCEDURE — 4004F PT TOBACCO SCREEN RCVD TLK: CPT | Performed by: INTERNAL MEDICINE

## 2019-04-23 PROCEDURE — 3046F HEMOGLOBIN A1C LEVEL >9.0%: CPT | Performed by: INTERNAL MEDICINE

## 2019-04-23 PROCEDURE — G8598 ASA/ANTIPLAT THER USED: HCPCS | Performed by: INTERNAL MEDICINE

## 2019-04-23 PROCEDURE — 99214 OFFICE O/P EST MOD 30 MIN: CPT | Performed by: INTERNAL MEDICINE

## 2019-04-23 PROCEDURE — 83036 HEMOGLOBIN GLYCOSYLATED A1C: CPT | Performed by: INTERNAL MEDICINE

## 2019-04-23 PROCEDURE — G8427 DOCREV CUR MEDS BY ELIG CLIN: HCPCS | Performed by: INTERNAL MEDICINE

## 2019-04-23 PROCEDURE — G8420 CALC BMI NORM PARAMETERS: HCPCS | Performed by: INTERNAL MEDICINE

## 2019-04-23 PROCEDURE — 2022F DILAT RTA XM EVC RTNOPTHY: CPT | Performed by: INTERNAL MEDICINE

## 2019-04-23 RX ORDER — TAMSULOSIN HYDROCHLORIDE 0.4 MG/1
0.4 CAPSULE ORAL DAILY
Qty: 30 CAPSULE | Refills: 5 | Status: SHIPPED | OUTPATIENT
Start: 2019-04-23 | End: 2019-04-23 | Stop reason: SDUPTHER

## 2019-04-23 RX ORDER — BLOOD-GLUCOSE METER
1 KIT MISCELLANEOUS DAILY
Qty: 1 KIT | Refills: 0 | Status: SHIPPED | OUTPATIENT
Start: 2019-04-23 | End: 2020-07-13 | Stop reason: SDUPTHER

## 2019-04-23 RX ORDER — PREGABALIN 75 MG/1
75 CAPSULE ORAL 2 TIMES DAILY
Qty: 60 CAPSULE | Refills: 3 | Status: SHIPPED | OUTPATIENT
Start: 2019-04-23 | End: 2019-07-08 | Stop reason: SDUPTHER

## 2019-04-23 RX ORDER — GLUCOSAM/CHON-MSM1/C/MANG/BOSW 500-416.6
1 TABLET ORAL 3 TIMES DAILY
Qty: 300 EACH | Refills: 11 | Status: SHIPPED | OUTPATIENT
Start: 2019-04-23 | End: 2019-05-30 | Stop reason: SDUPTHER

## 2019-04-23 RX ORDER — TRAZODONE HYDROCHLORIDE 300 MG/1
300 TABLET ORAL NIGHTLY
Qty: 60 TABLET | Refills: 5 | Status: SHIPPED | OUTPATIENT
Start: 2019-04-23 | End: 2019-04-23 | Stop reason: SDUPTHER

## 2019-04-23 RX ORDER — ATORVASTATIN CALCIUM 40 MG/1
40 TABLET, FILM COATED ORAL NIGHTLY
Qty: 30 TABLET | Refills: 3 | Status: SHIPPED | OUTPATIENT
Start: 2019-04-23 | End: 2019-04-23 | Stop reason: SDUPTHER

## 2019-04-23 RX ORDER — PANTOPRAZOLE SODIUM 40 MG/1
40 TABLET, DELAYED RELEASE ORAL
Qty: 30 TABLET | Refills: 5 | Status: SHIPPED | OUTPATIENT
Start: 2019-04-23 | End: 2019-04-23 | Stop reason: SDUPTHER
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
## 2019-04-24 RX ORDER — TAMSULOSIN HYDROCHLORIDE 0.4 MG/1
0.4 CAPSULE ORAL DAILY
Qty: 90 CAPSULE | Refills: 5 | Status: SHIPPED | OUTPATIENT
Start: 2019-04-24 | End: 2020-02-23

## 2019-04-24 RX ORDER — ATORVASTATIN CALCIUM 40 MG/1
TABLET, FILM COATED ORAL
Qty: 90 TABLET | Refills: 3 | Status: SHIPPED | OUTPATIENT
Start: 2019-04-24 | End: 2019-07-08 | Stop reason: SDUPTHER

## 2019-04-24 RX ORDER — TRAZODONE HYDROCHLORIDE 300 MG/1
TABLET ORAL
Qty: 90 TABLET | Refills: 5 | Status: SHIPPED | OUTPATIENT
Start: 2019-04-24 | End: 2019-07-08 | Stop reason: SDUPTHER

## 2019-04-24 RX ORDER — PANTOPRAZOLE SODIUM 40 MG/1
TABLET, DELAYED RELEASE ORAL
Qty: 90 TABLET | Refills: 5 | Status: SHIPPED | OUTPATIENT
Start: 2019-04-24 | End: 2020-02-23

## 2019-04-24 ASSESSMENT — ENCOUNTER SYMPTOMS
SHORTNESS OF BREATH: 0
BOWEL INCONTINENCE: 0
NAUSEA: 0
TROUBLE SWALLOWING: 0
DIARRHEA: 0
WHEEZING: 0
BLOOD IN STOOL: 0
RECTAL PAIN: 0
STRIDOR: 0
ABDOMINAL PAIN: 0
CHOKING: 0
VOICE CHANGE: 0
VOMITING: 0
CONSTIPATION: 0
BACK PAIN: 1
VISUAL CHANGE: 0
COUGH: 0

## 2019-04-25 ENCOUNTER — CARE COORDINATION (OUTPATIENT)
Dept: CARE COORDINATION | Age: 64
End: 2019-04-25

## 2019-04-25 NOTE — CARE COORDINATION
called [employer] with [employer]. Yudy Monk was approved for transportation.  attempted to contact Yudy Monk however no answer.  informed Yudy Monk he was approved and he can call [phone] to schedule a ride for medical appointments.     Plan:     Yudy Monk will utilize [employer]    will follow up in 2-3 weeks

## 2019-04-29 ENCOUNTER — CARE COORDINATION (OUTPATIENT)
Dept: CARE COORDINATION | Age: 64
End: 2019-04-29

## 2019-04-29 NOTE — CARE COORDINATION
Ambulatory Care Coordination Note  4/29/2019  CM Risk Score: 14  He Mortality Risk Score:      ACC: Florentino Razo, RN    Summary Note: CC spoke with patient explained that the patient assistance for his insulin was faxed last week. Advised him to be aware of any phone calls or mail from them. CC explained that he was approved for [employer] transportation and provided him with the number to call to schedule rides. [phone]   Patient was pleased and thanked YUMI and HUNTER Jack for helping him. He voiced concern that his A1C went up again, this is the first time he has voiced understanding of importance of getting a tighter glucose control. CC will continue to work with patient for another month to be certain his PA goes through and that he understands how to use his transportation.        Care Coordination Interventions    Program Enrollment:  Complex Care  Referral from Primary Care Provider:  No  Suggested Interventions and Community Resources  Disease Specific Clinic:   (Comment: podiatry)  Home Health Services:  Completed  Medication Assistance Program:  Completed  Registered Dietician:  Completed  Social Work:  In Process  Specialty Services Referral:  Completed (Comment: wound care)  Transportation Support:  Completed  Zone Management Tools:  Completed         Goals Addressed                 This Visit's Progress     Care Coordination Self Management   Improving     CC Self Management Goal  Patient Goal (What steps will patient take to achieve goal?): monitor and report blood sugar, low carb diet  Patient is able to discuss self-management of condition(s):Diabetes  Pt demonstrates adherence to medications  Pt demonstrates understanding of self-monitoring, CC reviewed and demonstrated how to use meter, patient returned demonstration 7.23.18  Patient is able to identify Red Flags:  Alert to potential adverse drug reactions(s) or side effects and actions to take should they arise  Discuss target symptoms and actions to take should they arise  Identify problems that require immediate PCP or specialist visit  Patient demonstrates understanding of access to PCP/Specialist:  Understands about scheduling routine Follow Up appointments   Understands about sick day appointment options for worsening of symptoms/progression (Same Day, E Visits)  Extend goal date to 6-1-19 continue to work with CC team, now has transportation set up and patient assistance sent for insulin.  Conditions and Symptoms   Improving     I will schedule office visits, as directed by my provider. I will keep my appointment or reschedule if I have to cancel. I will notify my provider of any symptoms that indicate a worsening of my condition. Barriers: none  Plan for overcoming my barriers: N/A  Confidence: 6/10  Anticipated Goal Completion Date: 5/8/18    Change goal date to 5.5.19, (Barrier)will work with [address] [place] [address] team to get assistance with transportation              Prior to Admission medications    Medication Sig Start Date End Date Taking? Authorizing Provider   traZODone (DESYREL) 300 MG tablet TAKE 1 TABLET BY MOUTH EVERY NIGHT 4/24/19   Maico Rack, DO   pantoprazole (PROTONIX) 40 MG tablet TAKE 1 TABLET BY MOUTH EVERY MORNING BEFORE BREAKFAST 4/24/19   Maico Rack, DO   metFORMIN (GLUCOPHAGE) 500 MG tablet TAKE 1 TABLET BY MOUTH TWICE DAILY WITH MEALS 4/24/19   Maico Rack, DO   atorvastatin (LIPITOR) 40 MG tablet TAKE 1 TABLET BY MOUTH EVERY NIGHT 4/24/19   Maico Rack, DO   tamsulosin (FLOMAX) 0.4 MG capsule TAKE 1 CAPSULE BY MOUTH DAILY 4/24/19   Maico Rack, DO   blood glucose test strips (ASCENSIA AUTODISC VI;ONE TOUCH ULTRA TEST VI) strip TID TO CHECK ELEVATED BLOOD SUGARS Use with associated glucose meter.  4/23/19   Maico Rack, DO   TRUEPLUS LANCETS 30G MISC Inject 1 each into the skin 3 times daily TO CHECK FLUCTUATING BLOOD SUGARS IE HYPERGLYCEMIA 4/23/19   Maico Rack, DO   glucose monitoring kit

## 2019-05-03 ENCOUNTER — TELEPHONE (OUTPATIENT)
Dept: FAMILY MEDICINE CLINIC | Age: 64
End: 2019-05-03

## 2019-05-03 NOTE — TELEPHONE ENCOUNTER
Pt plan does not cover the freestyle blood glucose system. I tried to call pt pharmacy and it was closed.

## 2019-05-03 NOTE — TELEPHONE ENCOUNTER
Okay would recommend if the pharmacy can provide patient which ever glucometer is covered under patients insurance , once we can get a hold of them , thanks

## 2019-05-13 ENCOUNTER — CARE COORDINATION (OUTPATIENT)
Dept: CARE COORDINATION | Age: 64
End: 2019-05-13

## 2019-05-13 NOTE — TELEPHONE ENCOUNTER
I am okay with that /that would be fine , which [facility] would he want to go to ? Is it already in chart so I can send there? Thanks. .would rather have him be on a insulin than nothing at all . ..

## 2019-05-13 NOTE — CARE COORDINATION
Ambulatory Care Coordination Note  5/13/2019  CM Risk Score: 14  He Mortality Risk Score:      ACC: Jesse Walls RN    Summary Note: Spoke with patient who has not picked up his meter or insulin. Per patient he does not qualify for medicaid but does not have money for his medications. Patient is upset with his family leaving him to take care of himself but understands that he is difficult to deal with. CC recommended the [facility] brand of insulin, CC will discuss with PCP. Patient is in agreement to see how much it is but would rather get samples from PCP office. CC checked and there are no samples available of his medication. He stated he has a few more days left. He will call back this week to check on samples. Care Coordination Interventions    Program Enrollment:  Complex Care  Referral from Primary Care Provider:  No  Suggested Interventions and Community Resources  Disease Specific Clinic:   (Comment: podiatry)  Home Health Services:  Completed  Medication Assistance Program:  Completed  Registered Dietician:  Completed  Social Work:  In Process  Specialty Services Referral:  Completed (Comment: wound care)  Transportation Support:  Completed  Zone Management Tools:  Completed         Goals Addressed                 This Visit's Progress     Conditions and Symptoms   Improving     I will schedule office visits, as directed by my provider. I will keep my appointment or reschedule if I have to cancel. I will notify my provider of any symptoms that indicate a worsening of my condition. Barriers: none  Plan for overcoming my barriers: N/A  Confidence: 6/10  Anticipated Goal Completion Date: 5/8/18    Change goal date to 5.5.19, (Barrier)will work with [address] [place] [address] team to get assistance with transportation              Prior to Admission medications    Medication Sig Start Date End Date Taking?  Authorizing Provider   traZODone (DESYREL) 300 MG tablet TAKE 1 TABLET BY MOUTH EVERY NIGHT 4/24/19 Assessment    Does the patient understand envrionmental exposure?:  Yes  Does the patient have a nebulizer?:  Yes     No patient-reported symptoms         Symptoms:

## 2019-05-14 ENCOUNTER — HOSPITAL ENCOUNTER (EMERGENCY)
Age: 64
Discharge: HOME | End: 2019-05-14
Attending: EMERGENCY MEDICINE
Payer: MEDICARE

## 2019-05-14 ENCOUNTER — APPOINTMENT (OUTPATIENT)
Dept: GENERAL RADIOLOGY | Age: 64
End: 2019-05-14
Payer: MEDICARE

## 2019-05-14 VITALS
WEIGHT: 175 LBS | HEIGHT: 72 IN | OXYGEN SATURATION: 98 % | BODY MASS INDEX: 23.7 KG/M2 | SYSTOLIC BLOOD PRESSURE: 124 MMHG | RESPIRATION RATE: 18 BRPM | HEART RATE: 56 BPM | DIASTOLIC BLOOD PRESSURE: 50 MMHG | TEMPERATURE: 98 F

## 2019-05-14 DIAGNOSIS — G57.92 NEUROPATHY OF LEFT FOOT: Primary | ICD-10-CM

## 2019-05-14 LAB
ABSOLUTE EOS #: 0.16 K/UL (ref 0–0.44)
ABSOLUTE IMMATURE GRANULOCYTE: 0.04 K/UL (ref 0–0.3)
ABSOLUTE LYMPH #: 2.58 K/UL (ref 1.1–3.7)
ABSOLUTE MONO #: 0.79 K/UL (ref 0.1–1.2)
ANION GAP SERPL CALCULATED.3IONS-SCNC: 14 MMOL/L (ref 9–17)
BASOPHILS # BLD: 1 % (ref 0–2)
BASOPHILS ABSOLUTE: 0.07 K/UL (ref 0–0.2)
BUN BLDV-MCNC: 18 MG/DL (ref 8–23)
BUN/CREAT BLD: 25 (ref 9–20)
CALCIUM SERPL-MCNC: 9.1 MG/DL (ref 8.6–10.4)
CHLORIDE BLD-SCNC: 104 MMOL/L (ref 98–107)
CO2: 22 MMOL/L (ref 20–31)
CREAT SERPL-MCNC: 0.73 MG/DL (ref 0.7–1.2)
DIFFERENTIAL TYPE: NORMAL
EOSINOPHILS RELATIVE PERCENT: 2 % (ref 1–4)
GFR AFRICAN AMERICAN: >60 ML/MIN
GFR NON-AFRICAN AMERICAN: >60 ML/MIN
GFR SERPL CREATININE-BSD FRML MDRD: ABNORMAL ML/MIN/{1.73_M2}
GFR SERPL CREATININE-BSD FRML MDRD: ABNORMAL ML/MIN/{1.73_M2}
GLUCOSE BLD-MCNC: 75 MG/DL (ref 70–99)
GLUCOSE BLD-MCNC: 81 MG/DL (ref 75–110)
HCT VFR BLD CALC: 44.1 % (ref 40.7–50.3)
HEMOGLOBIN: 13.7 G/DL (ref 13–17)
IMMATURE GRANULOCYTES: 0 %
LYMPHOCYTES # BLD: 26 % (ref 24–43)
MCH RBC QN AUTO: 29 PG (ref 25.2–33.5)
MCHC RBC AUTO-ENTMCNC: 31.1 G/DL (ref 28.4–34.8)
MCV RBC AUTO: 93.4 FL (ref 82.6–102.9)
MONOCYTES # BLD: 8 % (ref 3–12)
NRBC AUTOMATED: 0 PER 100 WBC
PDW BLD-RTO: 13.4 % (ref 11.8–14.4)
PLATELET # BLD: 381 K/UL (ref 138–453)
PLATELET ESTIMATE: NORMAL
PMV BLD AUTO: 8.9 FL (ref 8.1–13.5)
POTASSIUM SERPL-SCNC: 4.2 MMOL/L (ref 3.7–5.3)
RBC # BLD: 4.72 M/UL (ref 4.21–5.77)
RBC # BLD: NORMAL 10*6/UL
SEG NEUTROPHILS: 63 % (ref 36–65)
SEGMENTED NEUTROPHILS ABSOLUTE COUNT: 6.3 K/UL (ref 1.5–8.1)
SODIUM BLD-SCNC: 140 MMOL/L (ref 135–144)
WBC # BLD: 9.9 K/UL (ref 3.5–11.3)
WBC # BLD: NORMAL 10*3/UL

## 2019-05-14 PROCEDURE — 96372 THER/PROPH/DIAG INJ SC/IM: CPT

## 2019-05-14 PROCEDURE — 82947 ASSAY GLUCOSE BLOOD QUANT: CPT

## 2019-05-14 PROCEDURE — 80048 BASIC METABOLIC PNL TOTAL CA: CPT

## 2019-05-14 PROCEDURE — 73630 X-RAY EXAM OF FOOT: CPT

## 2019-05-14 PROCEDURE — 85025 COMPLETE CBC W/AUTO DIFF WBC: CPT

## 2019-05-14 PROCEDURE — 99285 EMERGENCY DEPT VISIT HI MDM: CPT

## 2019-05-14 PROCEDURE — 6360000002 HC RX W HCPCS: Performed by: PHYSICIAN ASSISTANT

## 2019-05-14 RX ORDER — HYDROCODONE BITARTRATE AND ACETAMINOPHEN 5; 325 MG/1; MG/1
1-2 TABLET ORAL EVERY 8 HOURS PRN
Qty: 15 TABLET | Refills: 0 | Status: SHIPPED | OUTPATIENT
Start: 2019-05-14 | End: 2019-05-17

## 2019-05-14 RX ORDER — KETOROLAC TROMETHAMINE 30 MG/ML
30 INJECTION, SOLUTION INTRAMUSCULAR; INTRAVENOUS ONCE
Status: COMPLETED | OUTPATIENT
Start: 2019-05-14 | End: 2019-05-14

## 2019-05-14 RX ADMIN — KETOROLAC TROMETHAMINE 30 MG: 30 INJECTION, SOLUTION INTRAMUSCULAR at 14:18

## 2019-05-14 ASSESSMENT — PAIN SCALES - GENERAL
PAINLEVEL_OUTOF10: 9
PAINLEVEL_OUTOF10: 10
PAINLEVEL_OUTOF10: 9
PAINLEVEL_OUTOF10: 8

## 2019-05-14 ASSESSMENT — PAIN DESCRIPTION - FREQUENCY: FREQUENCY: INTERMITTENT

## 2019-05-14 ASSESSMENT — PAIN DESCRIPTION - ORIENTATION: ORIENTATION: LEFT

## 2019-05-14 ASSESSMENT — PAIN DESCRIPTION - LOCATION: LOCATION: FOOT

## 2019-05-14 ASSESSMENT — PAIN DESCRIPTION - DESCRIPTORS: DESCRIPTORS: SHARP;SHOOTING;STABBING

## 2019-05-14 ASSESSMENT — PAIN DESCRIPTION - PAIN TYPE: TYPE: ACUTE PAIN

## 2019-05-14 NOTE — ED NOTES
ASSESSMENT:    Presents to ED per self per pvt auto with c/o pain to lt foot. Has hx neuropathy. States just below knee it's numb and  Has pain to bottom lt foot. Pain is constant and sometimes gets real sharp. Hx Diabetes. ACCUCHECK-  81 in triage. Stats that's the best his blood sugar has been in a ling time. Has hx rt BKA due to poor circulation. States the pain started about 12 mn 5/14. No injury. No open wounds.      Rodriguez Lugo RN  05/14/19 [id number]
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
## 2019-05-14 NOTE — ED PROVIDER NOTES
eMERGENCY dEPARTMENT eNCOUnter   Attending Attestation     Pt Name: Sima Ash  MRN: [id number]  [place] 1955  Date of evaluation: 5/14/19   Sima Ash is a 66 y.o. male with CC: Foot Pain (lt foot pain increasing since yesterday (hx neuropathy) / denies injury)    MDM:   Presents with c/o left foot pain since last night. H/o diabetes. Pain is along the plantar aspect of the left foot. Described as a sharp pain. States his foot is always ice cold, with no recent change in temperature. H/o BKA on the right. Tenderness to plantar left foot. No wound. Cold foot, with no ischemic changes. PT pulse present on doppler. Had trouble finding DP, but patient states this is typical.    I reviewed labs and imaging report. No acute electrolyte abnormality. XR L Foot negative for foreign body. Pain treated in ED. Plan pain control at home. Further treatment left to discretion of f/u physician. CRITICAL CARE:       EKG: All EKG's are interpreted by the Emergency Department Physician who either signs or Co-signs this chart in the absence of a cardiologist.      RADIOLOGY:All plain film, CT, MRI, and formal ultrasound images (except ED bedside ultrasound) are read by the radiologist, see reports below, unless otherwise noted in MDM or here. XR FOOT LEFT (MIN 3 VIEWS)   Preliminary Result   No acute osseous abnormality. LABS: All lab results were reviewed by myself, and all abnormals are listed below. Labs Reviewed   BASIC METABOLIC PANEL - Abnormal; Notable for the following components:       Result Value    Bun/Cre Ratio 25 (*)     All other components within normal limits   CBC WITH AUTO DIFFERENTIAL   POC GLUCOSE FINGERSTICK           I personally evaluated and examined the patient in conjunction with the APC and agree with the assessment, treatment plan, and disposition of the patient as recorded by the APC.    Susanna Kwok MD  Attending Emergency Physician Franck Rico MD  05/14/19 0810

## 2019-05-15 ENCOUNTER — CARE COORDINATION (OUTPATIENT)
Dept: CARE COORDINATION | Age: 64
End: 2019-05-15

## 2019-05-15 ENCOUNTER — TELEPHONE (OUTPATIENT)
Dept: FAMILY MEDICINE CLINIC | Age: 64
End: 2019-05-15

## 2019-05-15 DIAGNOSIS — I73.9 PVD (PERIPHERAL VASCULAR DISEASE) (HCC): Primary | ICD-10-CM

## 2019-05-15 DIAGNOSIS — M79.2 NEUROPATHIC PAIN OF RIGHT LOWER EXTREMITY: ICD-10-CM

## 2019-05-15 NOTE — TELEPHONE ENCOUNTER
Patient seen in ED for foot pain, would like referral to vascular.  Referral pending for your approval or denial.

## 2019-05-15 NOTE — CARE COORDINATION
scheduling routine Follow Up appointments   Understands about sick day appointment options for worsening of symptoms/progression (Same Day, E Visits)  Extend goal date to 6-1-19 continue to work with CC team, now has transportation set up and patient assistance sent for insulin.  Conditions and Symptoms   Improving     I will schedule office visits, as directed by my provider. I will keep my appointment or reschedule if I have to cancel. I will notify my provider of any symptoms that indicate a worsening of my condition. Barriers: none  Plan for overcoming my barriers: N/A  Confidence: 6/10  Anticipated Goal Completion Date: 5/8/18    Change goal date to 5.5.19, (Barrier)will work with [address] [place] [address] team to get assistance with transportation         Nutrition Plan   Improving     I will work on limiting portions of foods that contain carbohydrate an increase non-starchy vegetable intake    Barriers: lack of education  Plan for overcoming my barriers: cc dietitian to continue to educate on portion control with carbohydrates  Confidence: 5/10  Anticipated Goal Completion Date: 3/2019            Prior to Admission medications    Medication Sig Start Date End Date Taking? Authorizing Provider   HYDROcodone-acetaminophen (NORCO) 5-325 MG per tablet Take 1-2 tablets by mouth every 8 hours as needed for Pain for up to 3 days.  5/14/19 5/17/19  Delia Oneill PA-C   traZODone (DESYREL) 300 MG tablet TAKE 1 TABLET BY MOUTH EVERY NIGHT 4/24/19   [facility], DO   pantoprazole (PROTONIX) 40 MG tablet TAKE 1 TABLET BY MOUTH EVERY MORNING BEFORE BREAKFAST 4/24/19   [facility]   metFORMIN (GLUCOPHAGE) 500 MG tablet TAKE 1 TABLET BY MOUTH TWICE DAILY WITH MEALS 4/24/19   Urban Muck, DO   atorvastatin (LIPITOR) 40 MG tablet TAKE 1 TABLET BY MOUTH EVERY NIGHT 4/24/19   [facility], DO   tamsulosin (FLOMAX) 0.4 MG capsule TAKE 1 CAPSULE BY MOUTH DAILY 4/24/19   [facility], DO   blood glucose test strips (ASCENSIA AUTODISC VI;ONE TOUCH ULTRA TEST VI) strip TID TO CHECK ELEVATED BLOOD SUGARS Use with associated glucose meter. 4/23/19   Maico Rack, DO   TRUEPLUS LANCETS 30G MISC Inject 1 each into the skin 3 times daily TO CHECK FLUCTUATING BLOOD SUGARS IE HYPERGLYCEMIA 4/23/19   Maico Rack, DO   glucose monitoring kit (FREESTYLE) monitoring kit 1 kit by Does not apply route daily To check hyperglycemia /fluctating sugars 4/23/19   Maico Rack, DO   pregabalin (LYRICA) 75 MG capsule Take 1 capsule by mouth 2 times daily for 30 days. 4/23/19 5/23/19  Maico Rack, DO   Insulin Degludec (TRESIBA FLEXTOUCH) 100 UNIT/ML SOPN Inject 76 Units into the skin nightly 1/14/19   Maico Rack, DO   insulin glargine (LANTUS) 100 UNIT/ML injection vial Inject 20 Units into the skin 2 times daily 1/14/19   Maico Rack, DO   Insulin Pen Needle 32G X 4 MM MISC 1 each by Does not apply route daily 1/14/19   Maico Rack, DO   apixaban (ELIQUIS) 5 MG TABS tablet Take 1 tablet by mouth daily 11/13/18   Maico Rack, DO       No future appointments. ,   Diabetes Assessment    Medic Alert ID:  No  Meal Planning:  Avoidance of concentrated sweets   How often do you test your blood sugar?:  Daily   Do you have barriers with adherence to non-pharmacologic self-management interventions?  (Nutrition/Exercise/Self-Monitoring):  Yes   Have you ever had to go to the ED for symptoms of low blood sugar?:  No       Increase or Decrease trend in Blood Sugars      ,   COPD Assessment    Does the patient understand envrionmental exposure?:  Yes  Does the patient have a nebulizer?:  Yes     No patient-reported symptoms         Symptoms:          and   General Assessment    Do you have any symptoms that are causing concern?:  Yes  Progression since Onset:  Unchanged  Reported Symptoms:  Pain (Comment: foot)

## 2019-05-20 NOTE — TELEPHONE ENCOUNTER
Dr. Zeeshan Sewell, will you sign this encounter as I am unable to close it. POCT Troponin, Rapid (Order #[id number])     STRATUS ERROR ULTRA TROPONIN REQUESTED, RN AWARE       Slim Mendoza  06/08/17 7752

## 2019-05-24 ENCOUNTER — TELEPHONE (OUTPATIENT)
Dept: FAMILY MEDICINE CLINIC | Age: 64
End: 2019-05-24

## 2019-05-24 NOTE — TELEPHONE ENCOUNTER
Pt is aware that he needs to have his pvr done. Pt has an appt at [address] on 6/6/2019 in the morning.

## 2019-05-28 ENCOUNTER — CARE COORDINATION (OUTPATIENT)
Dept: CARE COORDINATION | Age: 64
End: 2019-05-28

## 2019-05-28 NOTE — CARE COORDINATION
Attempting to reach patient for a follow up care coordination call regarding any needs, questions or concerns.   Charmayne Ar, [address] [facility]

## 2019-05-30 DIAGNOSIS — Z79.4 TYPE 2 DIABETES MELLITUS WITH DIABETIC POLYNEUROPATHY, WITH LONG-TERM CURRENT USE OF INSULIN (HCC): ICD-10-CM

## 2019-05-30 DIAGNOSIS — E11.42 TYPE 2 DIABETES MELLITUS WITH DIABETIC POLYNEUROPATHY, WITH LONG-TERM CURRENT USE OF INSULIN (HCC): ICD-10-CM

## 2019-05-30 RX ORDER — GLUCOSAM/CHON-MSM1/C/MANG/BOSW 500-416.6
1 TABLET ORAL 3 TIMES DAILY
Qty: 300 EACH | Refills: 11 | Status: SHIPPED | OUTPATIENT
Start: 2019-05-30 | End: 2020-06-17 | Stop reason: SDUPTHER

## 2019-05-30 NOTE — TELEPHONE ENCOUNTER
lov 4/23/2019- [facility] called asking for refill on trazodone 100 mg and diabetic supplies for true metrix with alcohol wipes and control solution.

## 2019-05-31 ENCOUNTER — CARE COORDINATION (OUTPATIENT)
Dept: CARE COORDINATION | Age: 64
End: 2019-05-31

## 2019-05-31 NOTE — CARE COORDINATION
called and spoke to Pauline Lux. Pauline Lux reports that he has not had to use [employer] transportation through the [employer].  Pauline Lux reports he has the number and call and use them when needed. Pauline Lux denied further assistance with social work.  will sign off case.

## 2019-06-03 ENCOUNTER — CARE COORDINATION (OUTPATIENT)
Dept: CARE COORDINATION | Age: 64
End: 2019-06-03

## 2019-06-03 DIAGNOSIS — M79.2 NEUROPATHIC PAIN OF RIGHT LOWER EXTREMITY: Primary | ICD-10-CM

## 2019-06-03 NOTE — CARE COORDINATION
Ambulatory Care Coordination Note  6/3/2019  CM Risk Score: 14  He Mortality Risk Score:      ACC: Sarah Beth Ibarra, RN    Summary Note: Spoke with patient who stated he needs some insulin samples, he has been out for 1 week. CC explained that he should of called before now. CC will discuss samples with PCP and set some aside for him. Patient stated he is going to have testing done on his good leg on 6-6-19 he as been having increased pain in it. He also complains of phantom pain in his amputee leg. Patient is requesting a referral to pain management, cc discussed with PCP, referral will be placed. CC will remain on team until study of leg is complete to assist with any potential follow up appointments. Care Coordination Interventions    Program Enrollment:  Complex Care  Referral from Primary Care Provider:  No  Suggested Interventions and Community Resources  Disease Specific Clinic:   (Comment: podiatry)  Home Health Services:  Completed  Medication Assistance Program:  Completed  Registered Dietician:  Completed  Social Work:  In Process  Specialty Services Referral:  Completed (Comment: wound care)  Transportation Support:  Completed  Zone Management Tools:  Completed         Goals Addressed                 This Visit's Progress     Conditions and Symptoms   Improving     I will schedule office visits, as directed by my provider. I will keep my appointment or reschedule if I have to cancel. I will notify my provider of any symptoms that indicate a worsening of my condition.     Barriers: none  Plan for overcoming my barriers: N/A  Confidence: 6/10  Anticipated Goal Completion Date: 5/8/18    Change goal date to 5.5.19, (Barrier)will work with [address] [place] [address] team to get assistance with transportation         Nutrition Plan   Improving     I will work on limiting portions of foods that contain carbohydrate an increase non-starchy vegetable intake    Barriers: lack of education  Plan for overcoming my barriers: cc dietitian to continue to educate on portion control with carbohydrates  Confidence: 5/10  Anticipated Goal Completion Date: 3/2019            Prior to Admission medications    Medication Sig Start Date End Date Taking? Authorizing Provider   metFORMIN (GLUCOPHAGE) 500 MG tablet Take 1 tablet by mouth 2 times daily (with meals) 5/30/19   Betsy Cords, DO   TRUEPLUS LANCETS 30G MISC Inject 1 each into the skin 3 times daily TO CHECK FLUCTUATING BLOOD SUGARS IE HYPERGLYCEMIA 5/30/19   Betsy Cords, DO   traZODone (DESYREL) 300 MG tablet TAKE 1 TABLET BY MOUTH EVERY NIGHT 4/24/19   Betsy Cords, DO   pantoprazole (PROTONIX) 40 MG tablet TAKE 1 TABLET BY MOUTH EVERY MORNING BEFORE BREAKFAST 4/24/19   Betsy Cords, DO   atorvastatin (LIPITOR) 40 MG tablet TAKE 1 TABLET BY MOUTH EVERY NIGHT 4/24/19   Betsy Cords, DO   tamsulosin (FLOMAX) 0.4 MG capsule TAKE 1 CAPSULE BY MOUTH DAILY 4/24/19   Betsy Cords, DO   blood glucose test strips (ASCENSIA AUTODISC VI;ONE TOUCH ULTRA TEST VI) strip TID TO CHECK ELEVATED BLOOD SUGARS Use with associated glucose meter. 4/23/19   Betsy Cords, DO   glucose monitoring kit (FREESTYLE) monitoring kit 1 kit by Does not apply route daily To check hyperglycemia /fluctating sugars 4/23/19   Betsy Cords, DO   pregabalin (LYRICA) 75 MG capsule Take 1 capsule by mouth 2 times daily for 30 days.  4/23/19 5/23/19  Betsy Cords, DO   Insulin Degludec (TRESIBA FLEXTOUCH) 100 UNIT/ML SOPN Inject 76 Units into the skin nightly 1/14/19   Betsy Cords, DO   insulin glargine (LANTUS) 100 UNIT/ML injection vial Inject 20 Units into the skin 2 times daily 1/14/19   Betsy Cords, DO   Insulin Pen Needle 32G X 4 MM MISC 1 each by Does not apply route daily 1/14/19   Betsy Cords, DO   apixaban Carolyne Healy) 5 MG TABS tablet Take 1 tablet by mouth daily 11/13/18   Betsy Cords, DO       Future Appointments   Date Time Provider Jordon Frost   6/6/2019  8:00 AM [facility] None

## 2019-06-06 ENCOUNTER — HOSPITAL ENCOUNTER (OUTPATIENT)
Dept: VASCULAR LAB | Age: 64
Discharge: HOME | End: 2019-06-06
Payer: MEDICARE

## 2019-06-06 DIAGNOSIS — M79.2 NEUROPATHIC PAIN OF RIGHT LOWER EXTREMITY: ICD-10-CM

## 2019-06-06 DIAGNOSIS — I73.9 PVD (PERIPHERAL VASCULAR DISEASE) (HCC): Primary | ICD-10-CM

## 2019-06-06 PROCEDURE — 93924 LWR XTR VASC STDY BILAT: CPT

## 2019-06-17 ENCOUNTER — CARE COORDINATION (OUTPATIENT)
Dept: CARE COORDINATION | Age: 64
End: 2019-06-17

## 2019-06-17 NOTE — CARE COORDINATION
CC spoke with vascular surgeons office who will call patient to schedule. VM left for patient with this information as well as reminder of samples of insulin at PCP office and the contact number to pain management.

## 2019-06-17 NOTE — CARE COORDINATION
Ambulatory Care Coordination Note  6/17/2019  CM Risk Score: 14  He Mortality Risk Score:      ACC: Jesse Walls RN    Summary Note: Spoke with patient who denied any needs from PCP or CC at this time. Patient informed that the samples are still at PCP of insulin. Patient asked if his results were read yet from the VL arterial PVR lower scan? CC will route to PCP and follow up with patient with recommendations. Patient stated pain management reached out to him but then never followed up to schedule. CC provided him with that contact number.          Care Coordination Interventions    Program Enrollment:  Complex Care  Referral from Primary Care Provider:  No  Suggested Interventions and Community Resources  Disease Specific Clinic:   (Comment: podiatry)  Home Health Services:  Completed  Medication Assistance Program:  Completed  Registered Dietician:  Completed  Social Work:  In Process  Specialty Services Referral:  Completed (Comment: wound care)  Transportation Support:  Completed  Zone Management Tools:  Completed         Goals Addressed                 This Visit's Progress     Care Coordination Self Management   No change     CC Self Management Goal  Patient Goal (What steps will patient take to achieve goal?): monitor and report blood sugar, low carb diet  Patient is able to discuss self-management of condition(s):Diabetes  Pt demonstrates adherence to medications  Pt demonstrates understanding of self-monitoring, CC reviewed and demonstrated how to use meter, patient returned demonstration 7.23.18  Patient is able to identify Red Flags:  Alert to potential adverse drug reactions(s) or side effects and actions to take should they arise  Discuss target symptoms and actions to take should they arise  Identify problems that require immediate PCP or specialist visit  Patient demonstrates understanding of access to PCP/Specialist:  Understands about scheduling routine Follow Up appointments Understands about sick day appointment options for worsening of symptoms/progression (Same Day, E Visits)  Extend goal date to 6-1-19 continue to work with [address] [place] [address] team, now has transportation set up and patient assistance sent for insulin.  Conditions and Symptoms   No change     I will schedule office visits, as directed by my provider. I will keep my appointment or reschedule if I have to cancel. I will notify my provider of any symptoms that indicate a worsening of my condition. Barriers: none  Plan for overcoming my barriers: N/A  Confidence: 6/10  Anticipated Goal Completion Date: 5/8/18    Change goal date to 5.5.19, (Barrier)will work with [facility] team to get assistance with transportation         COMPLETED: Nutrition Plan        I will work on limiting portions of foods that contain carbohydrate an increase non-starchy vegetable intake    Barriers: lack of education  Plan for overcoming my barriers: cc dietitian to continue to educate on portion control with carbohydrates  Confidence: 5/10  Anticipated Goal Completion Date: 3/2019            Prior to Admission medications    Medication Sig Start Date End Date Taking?  Authorizing Provider   metFORMIN (GLUCOPHAGE) 500 MG tablet Take 1 tablet by mouth 2 times daily (with meals) 5/30/19   Brittny Holding, DO   TRUEPLUS LANCETS 30G MISC Inject 1 each into the skin 3 times daily TO CHECK FLUCTUATING BLOOD SUGARS IE HYPERGLYCEMIA 5/30/19   Brittny Holding, DO   traZODone (DESYREL) 300 MG tablet TAKE 1 TABLET BY MOUTH EVERY NIGHT 4/24/19   Brittny Holding, DO   pantoprazole (PROTONIX) 40 MG tablet TAKE 1 TABLET BY MOUTH EVERY MORNING BEFORE BREAKFAST 4/24/19   Brittny Holding, DO   atorvastatin (LIPITOR) 40 MG tablet TAKE 1 TABLET BY MOUTH EVERY NIGHT 4/24/19   Brittny [facility], DO   tamsulosin (FLOMAX) 0.4 MG capsule TAKE 1 CAPSULE BY MOUTH DAILY 4/24/19   Brittny Holding, DO   blood glucose test strips (ASCENSIA AUTODISC VI;ONE TOUCH ULTRA TEST VI) strip TID TO CHECK ELEVATED BLOOD SUGARS Use with associated glucose meter. 4/23/19   Deepthi Drivers, DO   glucose monitoring kit (FREESTYLE) monitoring kit 1 kit by Does not apply route daily To check hyperglycemia /fluctating sugars 4/23/19   Deepthi Drivers, DO   pregabalin (LYRICA) 75 MG capsule Take 1 capsule by mouth 2 times daily for 30 days. 4/23/19 5/23/19  Deepthi Drivers, DO   Insulin Degludec (TRESIBA FLEXTOUCH) 100 UNIT/ML SOPN Inject 76 Units into the skin nightly 1/14/19   Deepthi Drivers, DO   insulin glargine (LANTUS) 100 UNIT/ML injection vial Inject 20 Units into the skin 2 times daily 1/14/19   Deepthi Drivers, DO   Insulin Pen Needle 32G X 4 MM MISC 1 each by Does not apply route daily 1/14/19   Deepthi Drivers, DO   apixaban (ELIQUIS) 5 MG TABS tablet Take 1 tablet by mouth daily 11/13/18   Deepthi Drivers, DO       No future appointments. ,   Diabetes Assessment    Medic Alert ID:  No  Meal Planning:  Avoidance of concentrated sweets   How often do you test your blood sugar?:  Daily   Do you have barriers with adherence to non-pharmacologic self-management interventions?  (Nutrition/Exercise/Self-Monitoring):  Yes   Have you ever had to go to the ED for symptoms of low blood sugar?:  No       No patient-reported symptoms      ,   COPD Assessment    Does the patient understand envrionmental exposure?:  Yes  Does the patient have a nebulizer?:  Yes     No patient-reported symptoms         Symptoms:          and   General Assessment    Do you have any symptoms that are causing concern?:  No

## 2019-06-19 ENCOUNTER — HOSPITAL ENCOUNTER (EMERGENCY)
Age: 64
Discharge: HOME | End: 2019-06-19
Attending: EMERGENCY MEDICINE
Payer: MEDICARE

## 2019-06-19 ENCOUNTER — APPOINTMENT (OUTPATIENT)
Dept: GENERAL RADIOLOGY | Age: 64
End: 2019-06-19
Payer: MEDICARE

## 2019-06-19 ENCOUNTER — TELEPHONE (OUTPATIENT)
Dept: VASCULAR SURGERY | Age: 64
End: 2019-06-19

## 2019-06-19 ENCOUNTER — CARE COORDINATION (OUTPATIENT)
Dept: CARE COORDINATION | Age: 64
End: 2019-06-19

## 2019-06-19 VITALS
HEIGHT: 73 IN | BODY MASS INDEX: 23.19 KG/M2 | RESPIRATION RATE: 16 BRPM | WEIGHT: 175 LBS | SYSTOLIC BLOOD PRESSURE: 149 MMHG | HEART RATE: 92 BPM | TEMPERATURE: 98 F | DIASTOLIC BLOOD PRESSURE: 63 MMHG | OXYGEN SATURATION: 92 %

## 2019-06-19 VITALS
RESPIRATION RATE: 20 BRPM | BODY MASS INDEX: 23.19 KG/M2 | OXYGEN SATURATION: 98 % | SYSTOLIC BLOOD PRESSURE: 147 MMHG | HEART RATE: 84 BPM | TEMPERATURE: 98 F | DIASTOLIC BLOOD PRESSURE: 71 MMHG | WEIGHT: 175 LBS | HEIGHT: 73 IN

## 2019-06-19 DIAGNOSIS — G62.9 NEUROPATHY: Primary | ICD-10-CM

## 2019-06-19 DIAGNOSIS — M79.605 LEFT LEG PAIN: Primary | ICD-10-CM

## 2019-06-19 LAB
ABSOLUTE EOS #: 0.21 K/UL (ref 0–0.44)
ABSOLUTE IMMATURE GRANULOCYTE: 0.04 K/UL (ref 0–0.3)
ABSOLUTE LYMPH #: 2.42 K/UL (ref 1.1–3.7)
ABSOLUTE MONO #: 0.79 K/UL (ref 0.1–1.2)
ANION GAP SERPL CALCULATED.3IONS-SCNC: 11 MMOL/L (ref 9–17)
BASOPHILS # BLD: 1 % (ref 0–2)
BASOPHILS ABSOLUTE: 0.06 K/UL (ref 0–0.2)
BUN BLDV-MCNC: 15 MG/DL (ref 8–23)
BUN/CREAT BLD: 18 (ref 9–20)
CALCIUM SERPL-MCNC: 9 MG/DL (ref 8.6–10.4)
CHLORIDE BLD-SCNC: 96 MMOL/L (ref 98–107)
CO2: 27 MMOL/L (ref 20–31)
CREAT SERPL-MCNC: 0.84 MG/DL (ref 0.7–1.2)
DIFFERENTIAL TYPE: NORMAL
EOSINOPHILS RELATIVE PERCENT: 2 % (ref 1–4)
GFR AFRICAN AMERICAN: >60 ML/MIN
GFR NON-AFRICAN AMERICAN: >60 ML/MIN
GFR SERPL CREATININE-BSD FRML MDRD: ABNORMAL ML/MIN/{1.73_M2}
GFR SERPL CREATININE-BSD FRML MDRD: ABNORMAL ML/MIN/{1.73_M2}
GLUCOSE BLD-MCNC: 323 MG/DL (ref 70–99)
HCT VFR BLD CALC: 44 % (ref 40.7–50.3)
HEMOGLOBIN: 13.9 G/DL (ref 13–17)
IMMATURE GRANULOCYTES: 0 %
LYMPHOCYTES # BLD: 26 % (ref 24–43)
MCH RBC QN AUTO: 29.8 PG (ref 25.2–33.5)
MCHC RBC AUTO-ENTMCNC: 31.6 G/DL (ref 28.4–34.8)
MCV RBC AUTO: 94.2 FL (ref 82.6–102.9)
MONOCYTES # BLD: 8 % (ref 3–12)
NRBC AUTOMATED: 0 PER 100 WBC
PDW BLD-RTO: 13.4 % (ref 11.8–14.4)
PLATELET # BLD: 375 K/UL (ref 138–453)
PLATELET ESTIMATE: NORMAL
PMV BLD AUTO: 9.1 FL (ref 8.1–13.5)
POTASSIUM SERPL-SCNC: 4.3 MMOL/L (ref 3.7–5.3)
RBC # BLD: 4.67 M/UL (ref 4.21–5.77)
RBC # BLD: NORMAL 10*6/UL
SEG NEUTROPHILS: 63 % (ref 36–65)
SEGMENTED NEUTROPHILS ABSOLUTE COUNT: 5.95 K/UL (ref 1.5–8.1)
SODIUM BLD-SCNC: 134 MMOL/L (ref 135–144)
WBC # BLD: 9.5 K/UL (ref 3.5–11.3)
WBC # BLD: NORMAL 10*3/UL

## 2019-06-19 PROCEDURE — 99283 EMERGENCY DEPT VISIT LOW MDM: CPT

## 2019-06-19 PROCEDURE — 80048 BASIC METABOLIC PNL TOTAL CA: CPT

## 2019-06-19 PROCEDURE — 36415 COLL VENOUS BLD VENIPUNCTURE: CPT

## 2019-06-19 PROCEDURE — 6370000000 HC RX 637 (ALT 250 FOR IP): Performed by: PHYSICIAN ASSISTANT

## 2019-06-19 PROCEDURE — 85025 COMPLETE CBC W/AUTO DIFF WBC: CPT

## 2019-06-19 PROCEDURE — 6360000002 HC RX W HCPCS: Performed by: PHYSICIAN ASSISTANT

## 2019-06-19 PROCEDURE — 96372 THER/PROPH/DIAG INJ SC/IM: CPT

## 2019-06-19 PROCEDURE — 73630 X-RAY EXAM OF FOOT: CPT

## 2019-06-19 RX ORDER — MORPHINE SULFATE 4 MG/ML
4 INJECTION, SOLUTION INTRAMUSCULAR; INTRAVENOUS ONCE
Status: COMPLETED | OUTPATIENT
Start: 2019-06-19 | End: 2019-06-19

## 2019-06-19 RX ORDER — HYDROCODONE BITARTRATE AND ACETAMINOPHEN 5; 325 MG/1; MG/1
1 TABLET ORAL EVERY 6 HOURS PRN
Qty: 20 TABLET | Refills: 0 | Status: SHIPPED | OUTPATIENT
Start: 2019-06-19 | End: 2019-06-24

## 2019-06-19 RX ORDER — ONDANSETRON 4 MG/1
4 TABLET, ORALLY DISINTEGRATING ORAL ONCE
Status: COMPLETED | OUTPATIENT
Start: 2019-06-19 | End: 2019-06-19

## 2019-06-19 RX ADMIN — MORPHINE SULFATE 4 MG: 4 INJECTION INTRAVENOUS at 20:45

## 2019-06-19 RX ADMIN — ONDANSETRON 4 MG: 4 TABLET, ORALLY DISINTEGRATING ORAL at 20:45

## 2019-06-19 ASSESSMENT — ENCOUNTER SYMPTOMS
SHORTNESS OF BREATH: 0
ABDOMINAL PAIN: 0
CONSTIPATION: 0
COLOR CHANGE: 0
EYE REDNESS: 0
DIARRHEA: 0
VOMITING: 0
EYE DISCHARGE: 0
FACIAL SWELLING: 0
COUGH: 0

## 2019-06-19 ASSESSMENT — PAIN SCALES - GENERAL
PAINLEVEL_OUTOF10: 9

## 2019-06-19 NOTE — CARE COORDINATION
Patient called CC stating he was talking to someone on the phone and was disconnected. Patient was in the ED with leg pain today and stated that the Norco he was given is not helping. CC asked if he made appointments with pain management or the vascular surgeon yet, he said \"no\". CC explained that this is important and attempted to give him the numbers again. Patient stated that he did not have a pen. CC offered walk in clinic, he said he does not feel like getting up. CC explained importance of following through with referrals, he then took vascular surgeons number. He was encouraged to call cc back if he is not successful. CC will route message to PCP for recommendations.    Noted patient has been scheduled with vascular for 7.12.19

## 2019-06-21 ENCOUNTER — TELEPHONE (OUTPATIENT)
Dept: FAMILY MEDICINE CLINIC | Age: 64
End: 2019-06-21

## 2019-06-24 ENCOUNTER — CARE COORDINATION (OUTPATIENT)
Dept: CARE COORDINATION | Age: 64
End: 2019-06-24

## 2019-06-24 NOTE — CARE COORDINATION
Attempting to reach patient for a follow up care coordination call regarding any needs, questions or concerns.   Any Blas, [address]
Car seat

## 2019-06-28 ENCOUNTER — CARE COORDINATION (OUTPATIENT)
Dept: CARE COORDINATION | Age: 64
End: 2019-06-28

## 2019-06-28 NOTE — CARE COORDINATION
Ambulatory Care Coordination Note  6/28/2019  CM Risk Score: 14  Bryce Mortality Risk Score: [unfilled]    ACC: Freddie Maldonado RN    Summary Note: Spoke with patient who stated his blood sugar is doing \"OK\". He is taking his medication at this time. His pain is not improving in his foot. CC explained that PCP requested that he come into see her, she can prescribe something for pain. He made appointment for next week. CC will follow up with patient at this appointment and check on any needs.     Care Coordination Interventions    Program Enrollment:  Complex Care  Referral from Primary Care Provider:  No  Suggested Interventions and Community Resources  Disease Specific Clinic:   (Comment: podiatry)  Home Health Services:  Completed  Medication Assistance Program:  Completed  Registered Dietician:  Completed  Social Work:  In Process  Specialty Services Referral:  Completed (Comment: wound care)  Transportation Support:  Completed  Zone Management Tools:  Completed         Goals Addressed                 This Visit's Progress     Care Coordination Self Management   Improving     CC Self Management Goal  Patient Goal (What steps will patient take to achieve goal?): monitor and report blood sugar, low carb diet  Patient is able to discuss self-management of condition(s):Diabetes  Pt demonstrates adherence to medications  Pt demonstrates understanding of self-monitoring, CC reviewed and demonstrated how to use meter, patient returned demonstration 7.23.18  Patient is able to identify Red Flags:  Alert to potential adverse drug reactions(s) or side effects and actions to take should they arise  Discuss target symptoms and actions to take should they arise  Identify problems that require immediate PCP or specialist visit  Patient demonstrates understanding of access to PCP/Specialist:  Understands about scheduling routine Follow Up appointments   Understands about sick day appointment options for worsening of symptoms/progression (Same Day, E Visits)  Extend goal date to 6-1-19 continue to work with [address] [place] [address] team, now has transportation set up and patient assistance sent for insulin.  Conditions and Symptoms   Improving     I will schedule office visits, as directed by my provider. I will keep my appointment or reschedule if I have to cancel. I will notify my provider of any symptoms that indicate a worsening of my condition. Barriers: none  Plan for overcoming my barriers: N/A  Confidence: 6/10  Anticipated Goal Completion Date: 5/8/18    Change goal date to 5.5.19, (Barrier)will work with [address] [place] [address] team to get assistance with transportation              Prior to Admission medications    Medication Sig Start Date End Date Taking? Authorizing Provider   metFORMIN (GLUCOPHAGE) 500 MG tablet Take 1 tablet by mouth 2 times daily (with meals) 5/30/19   Dixie Clear, DO   TRUEPLUS LANCETS 30G MISC Inject 1 each into the skin 3 times daily TO CHECK FLUCTUATING BLOOD SUGARS IE HYPERGLYCEMIA 5/30/19   Dixie Clear, DO   traZODone (DESYREL) 300 MG tablet TAKE 1 TABLET BY MOUTH EVERY NIGHT 4/24/19   Dixie Clear, DO   pantoprazole (PROTONIX) 40 MG tablet TAKE 1 TABLET BY MOUTH EVERY MORNING BEFORE BREAKFAST 4/24/19   Dixie Clear, DO   atorvastatin (LIPITOR) 40 MG tablet TAKE 1 TABLET BY MOUTH EVERY NIGHT 4/24/19   Dixie Clear, DO   tamsulosin (FLOMAX) 0.4 MG capsule TAKE 1 CAPSULE BY MOUTH DAILY 4/24/19   Dixie Clear, DO   blood glucose test strips (ASCENSIA AUTODISC VI;ONE TOUCH ULTRA TEST VI) strip TID TO CHECK ELEVATED BLOOD SUGARS Use with associated glucose meter. 4/23/19   Dixie Clear, DO   glucose monitoring kit (FREESTYLE) monitoring kit 1 kit by Does not apply route daily To check hyperglycemia /fluctating sugars 4/23/19   Dixie Clear, DO   pregabalin (LYRICA) 75 MG capsule Take 1 capsule by mouth 2 times daily for 30 days.  4/23/19 5/23/19  Dixie Clear, DO   Insulin Degludec (TRESIBA FLEXTOUCH) 100 UNIT/ML SOPN Inject 76 Units into the skin nightly 1/14/19   Danilo Apo, DO   insulin glargine (LANTUS) 100 UNIT/ML injection vial Inject 20 Units into the skin 2 times daily 1/14/19   Danilo Apo, DO   Insulin Pen Needle 32G X 4 MM MISC 1 each by Does not apply route daily 1/14/19   Danilo Apo, DO   apixaban (ELIQUIS) 5 MG TABS tablet Take 1 tablet by mouth daily 11/13/18   Danilo Apo, DO       Future Appointments   Date Time Provider Jordon Frost   7/3/2019 11:00 AM Danilo Welsh, DO [facility]   7/12/2019 11:15 AM [facility]     ,   Diabetes Assessment    Medic Alert ID:  No  Meal Planning:  Avoidance of concentrated sweets   How often do you test your blood sugar?:  Daily   Do you have barriers with adherence to non-pharmacologic self-management interventions?  (Nutrition/Exercise/Self-Monitoring):  Yes   Have you ever had to go to the ED for symptoms of low blood sugar?:  No       Increase or Decrease trend in Blood Sugars      ,   COPD Assessment    Does the patient understand envrionmental exposure?:  Yes  Does the patient have a nebulizer?:  Yes     No patient-reported symptoms         Symptoms:          and   General Assessment    Do you have any symptoms that are causing concern?:  Yes  Progression since Onset:  Unchanged  Reported Symptoms:  Pain

## 2019-07-01 ENCOUNTER — APPOINTMENT (OUTPATIENT)
Dept: CT IMAGING | Age: 64
End: 2019-07-01
Payer: MEDICARE

## 2019-07-01 ENCOUNTER — APPOINTMENT (OUTPATIENT)
Dept: GENERAL RADIOLOGY | Age: 64
End: 2019-07-01
Payer: MEDICARE

## 2019-07-01 ENCOUNTER — HOSPITAL ENCOUNTER (EMERGENCY)
Age: 64
Discharge: HOME | End: 2019-07-01
Attending: EMERGENCY MEDICINE
Payer: MEDICARE

## 2019-07-01 VITALS
HEIGHT: 73 IN | SYSTOLIC BLOOD PRESSURE: 124 MMHG | WEIGHT: 175 LBS | BODY MASS INDEX: 23.19 KG/M2 | DIASTOLIC BLOOD PRESSURE: 61 MMHG | OXYGEN SATURATION: 93 % | TEMPERATURE: 98.7 F | RESPIRATION RATE: 18 BRPM | HEART RATE: 103 BPM

## 2019-07-01 DIAGNOSIS — G89.29 ACUTE EXACERBATION OF CHRONIC LOW BACK PAIN: Primary | ICD-10-CM

## 2019-07-01 DIAGNOSIS — G89.4 CHRONIC PAIN SYNDROME: ICD-10-CM

## 2019-07-01 DIAGNOSIS — M54.50 ACUTE EXACERBATION OF CHRONIC LOW BACK PAIN: Primary | ICD-10-CM

## 2019-07-01 DIAGNOSIS — N39.0 RECURRENT UTI: ICD-10-CM

## 2019-07-01 LAB
ABSOLUTE EOS #: 0.19 K/UL (ref 0–0.44)
ABSOLUTE IMMATURE GRANULOCYTE: 0.06 K/UL (ref 0–0.3)
ABSOLUTE LYMPH #: 2.23 K/UL (ref 1.1–3.7)
ABSOLUTE MONO #: 1 K/UL (ref 0.1–1.2)
ANION GAP SERPL CALCULATED.3IONS-SCNC: 13 MMOL/L (ref 9–17)
BASOPHILS # BLD: 1 % (ref 0–2)
BASOPHILS ABSOLUTE: 0.1 K/UL (ref 0–0.2)
BUN BLDV-MCNC: 16 MG/DL (ref 8–23)
BUN/CREAT BLD: 17 (ref 9–20)
CALCIUM SERPL-MCNC: 8.9 MG/DL (ref 8.6–10.4)
CHLORIDE BLD-SCNC: 95 MMOL/L (ref 98–107)
CHP ED QC CHECK: NORMAL
CO2: 24 MMOL/L (ref 20–31)
CREAT SERPL-MCNC: 0.96 MG/DL (ref 0.7–1.2)
DIFFERENTIAL TYPE: ABNORMAL
EOSINOPHILS RELATIVE PERCENT: 2 % (ref 1–4)
GFR AFRICAN AMERICAN: >60 ML/MIN
GFR NON-AFRICAN AMERICAN: >60 ML/MIN
GFR SERPL CREATININE-BSD FRML MDRD: ABNORMAL ML/MIN/{1.73_M2}
GFR SERPL CREATININE-BSD FRML MDRD: ABNORMAL ML/MIN/{1.73_M2}
GLUCOSE BLD-MCNC: 222 MG/DL (ref 70–99)
HCT VFR BLD CALC: 40.3 % (ref 40.7–50.3)
HEMOGLOBIN: 12.6 G/DL (ref 13–17)
IMMATURE GRANULOCYTES: 1 %
LYMPHOCYTES # BLD: 18 % (ref 24–43)
MCH RBC QN AUTO: 29.4 PG (ref 25.2–33.5)
MCHC RBC AUTO-ENTMCNC: 31.3 G/DL (ref 28.4–34.8)
MCV RBC AUTO: 94.2 FL (ref 82.6–102.9)
MONOCYTES # BLD: 8 % (ref 3–12)
NRBC AUTOMATED: 0 PER 100 WBC
PDW BLD-RTO: 13.2 % (ref 11.8–14.4)
PLATELET # BLD: 399 K/UL (ref 138–453)
PLATELET ESTIMATE: ABNORMAL
PMV BLD AUTO: 9.1 FL (ref 8.1–13.5)
POTASSIUM SERPL-SCNC: 4.9 MMOL/L (ref 3.7–5.3)
RBC # BLD: 4.28 M/UL (ref 4.21–5.77)
RBC # BLD: ABNORMAL 10*6/UL
SEG NEUTROPHILS: 70 % (ref 36–65)
SEGMENTED NEUTROPHILS ABSOLUTE COUNT: 8.81 K/UL (ref 1.5–8.1)
SODIUM BLD-SCNC: 132 MMOL/L (ref 135–144)
WBC # BLD: 12.4 K/UL (ref 3.5–11.3)
WBC # BLD: ABNORMAL 10*3/UL

## 2019-07-01 PROCEDURE — 85025 COMPLETE CBC W/AUTO DIFF WBC: CPT

## 2019-07-01 PROCEDURE — 6360000002 HC RX W HCPCS: Performed by: NURSE PRACTITIONER

## 2019-07-01 PROCEDURE — 73630 X-RAY EXAM OF FOOT: CPT

## 2019-07-01 PROCEDURE — 70450 CT HEAD/BRAIN W/O DYE: CPT

## 2019-07-01 PROCEDURE — 81003 URINALYSIS AUTO W/O SCOPE: CPT

## 2019-07-01 PROCEDURE — 99284 EMERGENCY DEPT VISIT MOD MDM: CPT

## 2019-07-01 PROCEDURE — 80048 BASIC METABOLIC PNL TOTAL CA: CPT

## 2019-07-01 PROCEDURE — 72125 CT NECK SPINE W/O DYE: CPT

## 2019-07-01 PROCEDURE — 90471 IMMUNIZATION ADMIN: CPT | Performed by: NURSE PRACTITIONER

## 2019-07-01 PROCEDURE — 90715 TDAP VACCINE 7 YRS/> IM: CPT | Performed by: NURSE PRACTITIONER

## 2019-07-01 PROCEDURE — 74176 CT ABD & PELVIS W/O CONTRAST: CPT

## 2019-07-01 RX ORDER — CEPHALEXIN 500 MG/1
500 CAPSULE ORAL 3 TIMES DAILY
Qty: 21 CAPSULE | Refills: 0 | Status: SHIPPED | OUTPATIENT
Start: 2019-07-01 | End: 2019-07-08

## 2019-07-01 RX ORDER — HYDROCODONE BITARTRATE AND ACETAMINOPHEN 5; 325 MG/1; MG/1
1 TABLET ORAL EVERY 6 HOURS PRN
Qty: 20 TABLET | Refills: 0 | Status: SHIPPED | OUTPATIENT
Start: 2019-07-01 | End: 2019-07-04

## 2019-07-01 RX ADMIN — TETANUS TOXOID, REDUCED DIPHTHERIA TOXOID AND ACELLULAR PERTUSSIS VACCINE, ADSORBED 0.5 ML: 5; 2.5; 8; 8; 2.5 SUSPENSION INTRAMUSCULAR at 20:38

## 2019-07-01 ASSESSMENT — ENCOUNTER SYMPTOMS
ABDOMINAL PAIN: 0
BACK PAIN: 1
COLOR CHANGE: 1
NAUSEA: 0

## 2019-07-01 ASSESSMENT — PAIN SCALES - GENERAL: PAINLEVEL_OUTOF10: 8

## 2019-07-02 NOTE — ED PROVIDER NOTES
reconstruction, and/or weight based   adjustment of the mA/kV was utilized to reduce the radiation dose to as low   as reasonably achievable.; CT of the cervical spine was performed without the   administration of intravenous contrast. Multiplanar reformatted images are   provided for review. Dose modulation, iterative reconstruction, and/or weight   based adjustment of the mA/kV was utilized to reduce the radiation dose to as   low as reasonably achievable.       COMPARISON:   09/24/2014       HISTORY:   ORDERING SYSTEM PROVIDED HISTORY: fall   TECHNOLOGIST PROVIDED HISTORY:   ; ORDERING SYSTEM PROVIDED HISTORY: Fall; ORDERING SYSTEM PROVIDED HISTORY:   Right flank pain   TECHNOLOGIST PROVIDED HISTORY:           FINDINGS:   CT HEAD:       BRAIN/VENTRICLES: There is no acute intracranial hemorrhage, mass effect or   midline shift. No abnormal extra-axial fluid collection.  The gray-white   differentiation is maintained without evidence of an acute infarct.  There is   no evidence of hydrocephalus.       ORBITS: The visualized portion of the orbits demonstrate no acute abnormality.       SINUSES: The visualized paranasal sinuses and mastoid air cells demonstrate   no acute abnormality.       SOFT TISSUES/SKULL:  No acute abnormality of the visualized skull or soft   tissues.       CT CERVICAL SPINE:       BONES/ALIGNMENT: There is no evidence of an acute cervical spine fracture.    There is normal alignment of the cervical spine.       DEGENERATIVE CHANGES: Mild to moderate diffuse degenerative changes.       SOFT TISSUES: There is no prevertebral soft tissue swelling.           Impression   No acute intracranial abnormality.       No acute fracture or subluxation of the cervical spine.       Stable exams.             Performed by ED Physician - none    LABS:  Labs Reviewed   CBC WITH AUTO DIFFERENTIAL - Abnormal; Notable for the following components:       Result Value    WBC 12.4 (*)     Hemoglobin 12.6 (*) Hematocrit 40.3 (*)     Seg Neutrophils 70 (*)     Lymphocytes 18 (*)     Immature Granulocytes 1 (*)     Segs Absolute 8.81 (*)     All other components within normal limits   BASIC METABOLIC PANEL - Abnormal; Notable for the following components:    Glucose 222 (*)     Sodium 132 (*)     Chloride 95 (*)     All other components within normal limits   POCT URINALYSIS DIPSTICK       All other labs were within normal range or not returned as of this dictation. EMERGENCY DEPARTMENT COURSE and DIFFERENTIAL DIAGNOSIS/MDM:   Vitals:    Vitals:    07/01/19 1922 07/01/19 1923   BP: 124/61    Pulse: 103    Resp: 18    Temp: 98.7 °F (37.1 °C)    SpO2: 93%    Weight:  175 lb (79.4 kg)   Height:  6' 1\" (1.854 m)     Patient evaluated. He is treated with IV fluids and medications for his discomfort. He is normotensive. He is afebrile. White count is minimally elevated. Imaging studies and laboratory studies are reviewed. Imaging studies show no acute findings. Laboratory studies also returned benign. Urine was sent on for culture. Patient also requested a prescription for OxyContin. He is supplied with a limited amount of Norco for discomfort. He is covered with Keflex pending return of urine culture given his history of recurrent UTI. He is advised to follow-up in the outpatient setting    CONSULTS:  None    PROCEDURES:  None    FINAL IMPRESSION      1. Acute exacerbation of chronic low back pain    2. Chronic pain syndrome    3.  Recurrent UTI          DISPOSITION/PLAN   DISPOSITION Decision To Discharge 07/01/2019 09:59:21 PM      PATIENT REFERRED TO:   DO Rasheeda Palacio [address] [place] [address] [facility]    Schedule an appointment as soon as possible for a visit       [facility]  [address] [facility]  [phone]    As needed, If symptoms worsen      DISCHARGE MEDICATIONS:     New Prescriptions    CEPHALEXIN (KEFLEX) 500 MG CAPSULE    Take 1 capsule by mouth 3 times

## 2019-07-02 NOTE — ED PROVIDER NOTES
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 GLUCOSE MONITORING KIT (FREESTYLE) MONITORING KIT    1 kit by Does not apply route daily To check hyperglycemia /fluctating sugars    INSULIN DEGLUDEC (TRESIBA FLEXTOUCH) 100 UNIT/ML SOPN    Inject 76 Units into the skin nightly    INSULIN GLARGINE (LANTUS) 100 UNIT/ML INJECTION VIAL    Inject 20 Units into the skin 2 times daily    INSULIN PEN NEEDLE 32G X 4 MM MISC    1 each by Does not apply route daily    METFORMIN (GLUCOPHAGE) 500 MG TABLET    Take 1 tablet by mouth 2 times daily (with meals)    PANTOPRAZOLE (PROTONIX) 40 MG TABLET    TAKE 1 TABLET BY MOUTH EVERY MORNING BEFORE BREAKFAST    PREGABALIN (LYRICA) 75 MG CAPSULE    Take 1 capsule by mouth 2 times daily for 30 days.     TAMSULOSIN (FLOMAX) 0.4 MG CAPSULE    TAKE 1 CAPSULE BY MOUTH DAILY    TRAZODONE (DESYREL) 300 MG TABLET    TAKE 1 TABLET BY MOUTH EVERY NIGHT    TRUEPLUS LANCETS 30G MISC    Inject 1 each into the skin 3 times daily TO CHECK FLUCTUATING BLOOD SUGARS IE HYPERGLYCEMIA       ALLERGIES     Gabapentin    FAMILY HISTORY       Family History   Problem Relation Age of Onset    Diabetes Mother     Cancer Mother     Alcohol Abuse Father     Cancer Sister     Alcohol Abuse Maternal Aunt     Alcohol Abuse Maternal Uncle     Alcohol Abuse Paternal Aunt           SOCIAL HISTORY       Social History     Socioeconomic History    Marital

## 2019-07-03 ENCOUNTER — CARE COORDINATION (OUTPATIENT)
Dept: CARE COORDINATION | Age: 64
End: 2019-07-03

## 2019-07-03 NOTE — CARE COORDINATION
transportation set up and patient assistance sent for insulin.  Conditions and Symptoms   Improving     I will schedule office visits, as directed by my provider. I will keep my appointment or reschedule if I have to cancel. I will notify my provider of any symptoms that indicate a worsening of my condition. Barriers: none  Plan for overcoming my barriers: N/A  Confidence: 6/10  Anticipated Goal Completion Date: 5/8/18    Change goal date to 7.25.19, (Barrier)will work with [address] [place] [address] team to get assistance with transportation              Prior to Admission medications    Medication Sig Start Date End Date Taking? Authorizing Provider   HYDROcodone-acetaminophen (NORCO) 5-325 MG per tablet Take 1 tablet by mouth every 6 hours as needed for Pain for up to 3 days. 7/1/19 7/4/19  Hazel Bustillo MD   cephALEXin [facility]) 500 MG capsule Take 1 capsule by mouth 3 times daily for 7 days 7/1/19 7/8/19  Hazel Bustillo MD   metFORMIN (GLUCOPHAGE) 500 MG tablet Take 1 tablet by mouth 2 times daily (with meals) 5/30/19   Gume Cheema, DO   TRUEPLUS LANCETS 30G MISC Inject 1 each into the skin 3 times daily TO CHECK FLUCTUATING BLOOD SUGARS IE HYPERGLYCEMIA 5/30/19   Gume Cheema, DO   traZODone (DESYREL) 300 MG tablet TAKE 1 TABLET BY MOUTH EVERY NIGHT 4/24/19   Gume Cheema, DO   pantoprazole (PROTONIX) 40 MG tablet TAKE 1 TABLET BY MOUTH EVERY MORNING BEFORE BREAKFAST 4/24/19   Gume Cheema, DO   atorvastatin (LIPITOR) 40 MG tablet TAKE 1 TABLET BY MOUTH EVERY NIGHT 4/24/19   Gume Cheema, DO   tamsulosin (FLOMAX) 0.4 MG capsule TAKE 1 CAPSULE BY MOUTH DAILY 4/24/19   Gume Cheema, DO   blood glucose test strips (ASCENSIA AUTODISC VI;ONE TOUCH ULTRA TEST VI) strip TID TO CHECK ELEVATED BLOOD SUGARS Use with associated glucose meter.  4/23/19   Gume Cheema, DO   glucose monitoring kit (FREESTYLE) monitoring kit 1 kit by Does not apply route daily To check hyperglycemia /fluctating sugars 4/23/19   Geetha

## 2019-07-08 ENCOUNTER — OFFICE VISIT (OUTPATIENT)
Dept: FAMILY MEDICINE CLINIC | Age: 64
End: 2019-07-08
Payer: MEDICARE

## 2019-07-08 ENCOUNTER — CARE COORDINATION (OUTPATIENT)
Dept: CARE COORDINATION | Age: 64
End: 2019-07-08

## 2019-07-08 VITALS
HEIGHT: 73 IN | HEART RATE: 80 BPM | TEMPERATURE: 97.7 F | SYSTOLIC BLOOD PRESSURE: 132 MMHG | WEIGHT: 169.6 LBS | DIASTOLIC BLOOD PRESSURE: 82 MMHG | OXYGEN SATURATION: 96 % | BODY MASS INDEX: 22.48 KG/M2 | RESPIRATION RATE: 16 BRPM

## 2019-07-08 DIAGNOSIS — Z91.14 NON COMPLIANCE W MEDICATION REGIMEN: ICD-10-CM

## 2019-07-08 DIAGNOSIS — Z79.4 TYPE 2 DIABETES MELLITUS WITH DIABETIC POLYNEUROPATHY, WITH LONG-TERM CURRENT USE OF INSULIN (HCC): Primary | ICD-10-CM

## 2019-07-08 DIAGNOSIS — M79.672 FOOT PAIN, LEFT: ICD-10-CM

## 2019-07-08 DIAGNOSIS — E11.42 DIABETIC POLYNEUROPATHY ASSOCIATED WITH TYPE 2 DIABETES MELLITUS (HCC): ICD-10-CM

## 2019-07-08 DIAGNOSIS — Z13.220 SCREENING FOR HYPERLIPIDEMIA: ICD-10-CM

## 2019-07-08 DIAGNOSIS — I73.9 PVD (PERIPHERAL VASCULAR DISEASE) (HCC): ICD-10-CM

## 2019-07-08 DIAGNOSIS — M79.2 NEUROPATHIC PAIN OF RIGHT LOWER EXTREMITY: ICD-10-CM

## 2019-07-08 DIAGNOSIS — E11.42 TYPE 2 DIABETES MELLITUS WITH DIABETIC POLYNEUROPATHY, WITH LONG-TERM CURRENT USE OF INSULIN (HCC): Primary | ICD-10-CM

## 2019-07-08 PROCEDURE — G8420 CALC BMI NORM PARAMETERS: HCPCS | Performed by: INTERNAL MEDICINE

## 2019-07-08 PROCEDURE — 2022F DILAT RTA XM EVC RTNOPTHY: CPT | Performed by: INTERNAL MEDICINE

## 2019-07-08 PROCEDURE — G8598 ASA/ANTIPLAT THER USED: HCPCS | Performed by: INTERNAL MEDICINE

## 2019-07-08 PROCEDURE — 3046F HEMOGLOBIN A1C LEVEL >9.0%: CPT | Performed by: INTERNAL MEDICINE

## 2019-07-08 PROCEDURE — G8427 DOCREV CUR MEDS BY ELIG CLIN: HCPCS | Performed by: INTERNAL MEDICINE

## 2019-07-08 PROCEDURE — 99214 OFFICE O/P EST MOD 30 MIN: CPT | Performed by: INTERNAL MEDICINE

## 2019-07-08 PROCEDURE — 4004F PT TOBACCO SCREEN RCVD TLK: CPT | Performed by: INTERNAL MEDICINE

## 2019-07-08 PROCEDURE — 3017F COLORECTAL CA SCREEN DOC REV: CPT | Performed by: INTERNAL MEDICINE

## 2019-07-08 RX ORDER — ALBUTEROL SULFATE 90 UG/1
2 AEROSOL, METERED RESPIRATORY (INHALATION) EVERY 6 HOURS PRN
Qty: 25.5 G | Refills: 3 | Status: SHIPPED | OUTPATIENT
Start: 2019-07-08 | End: 2019-07-12

## 2019-07-08 RX ORDER — ALBUTEROL SULFATE 90 UG/1
2 AEROSOL, METERED RESPIRATORY (INHALATION) EVERY 6 HOURS PRN
Qty: 1 INHALER | Refills: 3 | Status: SHIPPED | OUTPATIENT
Start: 2019-07-08 | End: 2019-07-08 | Stop reason: SDUPTHER

## 2019-07-08 RX ORDER — TRAZODONE HYDROCHLORIDE 300 MG/1
300 TABLET ORAL NIGHTLY
Qty: 90 TABLET | Refills: 5 | Status: ON HOLD | OUTPATIENT
Start: 2019-07-08 | End: 2020-02-24

## 2019-07-08 RX ORDER — PREGABALIN 75 MG/1
75 CAPSULE ORAL 2 TIMES DAILY
Qty: 60 CAPSULE | Refills: 3 | Status: ON HOLD | OUTPATIENT
Start: 2019-07-08 | End: 2020-02-24

## 2019-07-08 RX ORDER — ATORVASTATIN CALCIUM 40 MG/1
40 TABLET, FILM COATED ORAL DAILY
Qty: 90 TABLET | Refills: 3 | Status: SHIPPED | OUTPATIENT
Start: 2019-07-08 | End: 2019-11-11 | Stop reason: SDUPTHER

## 2019-07-08 ASSESSMENT — PATIENT HEALTH QUESTIONNAIRE - PHQ9
1. LITTLE INTEREST OR PLEASURE IN DOING THINGS: 0
2. FEELING DOWN, DEPRESSED OR HOPELESS: 0
SUM OF ALL RESPONSES TO PHQ9 QUESTIONS 1 & 2: 0
SUM OF ALL RESPONSES TO PHQ QUESTIONS 1-9: 0
SUM OF ALL RESPONSES TO PHQ QUESTIONS 1-9: 0

## 2019-07-08 NOTE — CARE COORDINATION
Ambulatory Care Coordination Note  7/8/2019  CM Risk Score: 10  Charlson 10 Year Mortality Risk Score: 100%     ACC: Isac Bernard, RN    Summary Note: CC met with patient at his follow up appointment with PCP. Patient denied any needs from CC today. He is going to discuss pain medication with PCP for his leg pain. Patient had a bruise on his forehead. When asked if he fell, patient stated yes he tripped over his prosthetic. Per patient the wound was much worse when it happened. CC educated patient on safety and fall prevention. Patient stated \"I know\". Patient will receive samples of insulin today as well. 1 Additional follow up call for pain check and falls before considering graduation. Care Coordination Interventions    Program Enrollment:  Complex Care  Referral from Primary Care Provider:  No  Suggested Interventions and Community Resources  Disease Specific Clinic:   (Comment: podiatry)  Home Health Services:  Completed  Medication Assistance Program:  Completed  Registered Dietician:  Completed  Social Work:  In Process  Specialty Services Referral:  Completed (Comment: wound care)  Transportation Support:  Completed  Zone Management Tools:  Completed         Goals Addressed                 This Visit's Progress     Conditions and Symptoms   Improving     I will schedule office visits, as directed by my provider. I will keep my appointment or reschedule if I have to cancel. I will notify my provider of any symptoms that indicate a worsening of my condition. Barriers: none  Plan for overcoming my barriers: N/A  Confidence: 6/10  Anticipated Goal Completion Date: 5/8/18    Change goal date to 7.25.19, (Barrier)will work with [address] [place] [address] team to get assistance with transportation              Prior to Admission medications    Medication Sig Start Date End Date Taking? Authorizing Provider   pregabalin (LYRICA) 75 MG capsule Take 1 capsule by mouth 2 times daily for 30 days.  7/8/19 8/7/19  Hazel Mcdonough, DO traZODone (DESYREL) 300 MG tablet Take 1 tablet by mouth nightly 7/8/19   Rivas Appl, DO   atorvastatin (LIPITOR) 40 MG tablet Take 1 tablet by mouth daily 7/8/19   Rivas Appl, DO   albuterol sulfate HFA (PROVENTIL HFA) 108 (90 Base) MCG/ACT inhaler Inhale 2 puffs into the lungs every 6 hours as needed for Wheezing 7/8/19   Rivas Appl, DO   cephALEXin (KEFLEX) 500 MG capsule Take 1 capsule by mouth 3 times daily for 7 days 7/1/19 7/8/19  Rhonda Hussein MD   metFORMIN (GLUCOPHAGE) 500 MG tablet Take 1 tablet by mouth 2 times daily (with meals) 5/30/19   Rivas Appl, DO   TRUEPLUS LANCETS 30G MISC Inject 1 each into the skin 3 times daily TO CHECK FLUCTUATING BLOOD SUGARS IE HYPERGLYCEMIA 5/30/19   Rivas Appl, DO   pantoprazole (PROTONIX) 40 MG tablet TAKE 1 TABLET BY MOUTH EVERY MORNING BEFORE BREAKFAST 4/24/19   Rivas Appl, DO   tamsulosin (FLOMAX) 0.4 MG capsule TAKE 1 CAPSULE BY MOUTH DAILY 4/24/19   Rivas Appl, DO   blood glucose test strips (ASCENSIA AUTODISC VI;ONE TOUCH ULTRA TEST VI) strip TID TO CHECK ELEVATED BLOOD SUGARS Use with associated glucose meter.  4/23/19   Rivas Appl, DO   glucose monitoring kit (FREESTYLE) monitoring kit 1 kit by Does not apply route daily To check hyperglycemia /fluctating sugars 4/23/19   Rivas Appl, DO   Insulin Degludec (TRESIBA FLEXTOUCH) 100 UNIT/ML SOPN Inject 76 Units into the skin nightly 1/14/19   Rivas Appl, DO   insulin glargine (LANTUS) 100 UNIT/ML injection vial Inject 20 Units into the skin 2 times daily 1/14/19   Rivas Appl, DO   Insulin Pen Needle 32G X 4 MM MISC 1 each by Does not apply route daily 1/14/19   Rivas Appl, DO   apixaban Elio Canseco) 5 MG TABS tablet Take 1 tablet by mouth daily 11/13/18   Rivas Appl, DO       Future Appointments   Date Time Provider Jordon Frost   7/10/2019  2:40 PM Dahiana Wynn MD [facility]   7/12/2019 11:15 AM Arvind Loving MD [facility]     ,   Diabetes Assessment

## 2019-07-10 ASSESSMENT — ENCOUNTER SYMPTOMS
SHORTNESS OF BREATH: 1
BACK PAIN: 1
DIARRHEA: 0
CONSTIPATION: 0
VOMITING: 0
CHOKING: 0
COLOR CHANGE: 1
ABDOMINAL PAIN: 0
CHEST TIGHTNESS: 1
BLOOD IN STOOL: 0
BLURRED VISION: 1
WHEEZING: 0
NAUSEA: 0
VISUAL CHANGE: 1
COUGH: 1
VOICE CHANGE: 0

## 2019-07-10 NOTE — PROGRESS NOTES
Abdominal: Soft. Bowel sounds are normal. There is no splenomegaly or hepatomegaly. There is no tenderness. Musculoskeletal:        Left foot: There is decreased range of motion and deformity. Feet:   Left Foot:   Skin Integrity: Positive for skin breakdown, warmth, callus and dry skin. Negative for blister or erythema. Lymphadenopathy:     He has no cervical adenopathy. Neurological: He is alert and oriented to person, place, and time. He has normal strength and normal reflexes. He displays atrophy. A sensory deficit is present. No cranial nerve deficit. Gait abnormal. Coordination normal.   Skin: Skin is warm and dry. No rash noted. He is not diaphoretic. Psychiatric: He has a normal mood and affect. His speech is normal and behavior is normal. Thought content is not delusional. Cognition and memory are normal. He expresses inappropriate judgment. He expresses no suicidal plans and no homicidal plans. Nursing note and vitals reviewed. /82 (Site: Right Upper Arm, Position: Sitting, Cuff Size: Medium Adult)   Pulse 80   Temp 97.7 °F (36.5 °C) (Tympanic)   Resp 16   Ht 6' 1\" (1.854 m)   Wt 169 lb 9.6 oz (76.9 kg)   SpO2 96%   BMI 22.38 kg/m²     Assessment:       Diagnosis Orders   1. Type 2 diabetes mellitus with diabetic polyneuropathy, with long-term current use of insulin (HCC)  Yimi Alonzo MD, Ophthalmology, [facility] Medication Mgmt (Diabetes - Clinical Pharmacy) - [facility]    atorvastatin (LIPITOR) 40 MG tablet   2. Screening for hyperlipidemia     3. Neuropathic pain of right lower extremity     4. PVD (peripheral vascular disease) ([facility]     5. Diabetic polyneuropathy associated with type 2 diabetes mellitus (HCC)  pregabalin (LYRICA) 75 MG capsule   6. Foot pain, left     7. Non compliance w medication regimen               Plan:       Return in about 1 month (around 8/5/2019), or if symptoms worsen or fail to improve, for routine DM.     Orders Placed This did not see evpedroe of urinary issues but could not find UA or UC in chart     Will refer to optho as well   Follow up in 4 weeks. Seek immediate medical attention for any chest pain , severe trouble breathing and/or visual changes. Patientgiven educational materials - see patient instructions. Discussed use, benefit,and side effects of prescribed medications. All patient questions answered. Ptvoiced understanding. Reviewed health maintenance. Instructed to continue currentmedications, diet and exercise. Patient agreed with treatment plan. Follow up asdirected.      Electronically signed by Ginette Arias DO on 7/10/2019 at 11:15 AM

## 2019-07-12 RX ORDER — ALBUTEROL SULFATE 90 UG/1
2 AEROSOL, METERED RESPIRATORY (INHALATION) 4 TIMES DAILY PRN
Qty: 3 INHALER | Refills: 1 | Status: SHIPPED | OUTPATIENT
Start: 2019-07-12 | End: 2020-02-23

## 2019-07-17 ENCOUNTER — CARE COORDINATION (OUTPATIENT)
Dept: CARE COORDINATION | Age: 64
End: 2019-07-17

## 2019-07-17 NOTE — TELEPHONE ENCOUNTER
Patients ins will not cover albuterol. They stated if you called in ventolin they will cover. Do you want me to still do a prior authorization with albuterol ?

## 2019-07-17 NOTE — CARE COORDINATION
arise  Discuss target symptoms and actions to take should they arise  Identify problems that require immediate PCP or specialist visit  Patient demonstrates understanding of access to PCP/Specialist:  Understands about scheduling routine Follow Up appointments   Understands about sick day appointment options for worsening of symptoms/progression (Same Day, E Visits)  Extend goal date to 6-1-19 continue to work with CC team, now has transportation set up and patient assistance sent for insulin.  Conditions and Symptoms   No change     I will schedule office visits, as directed by my provider. I will keep my appointment or reschedule if I have to cancel. I will notify my provider of any symptoms that indicate a worsening of my condition. Barriers: none  Plan for overcoming my barriers: N/A  Confidence: 6/10  Anticipated Goal Completion Date: 5/8/18    Change goal date to 7.25.19, (Barrier)will work with [address] [place] [address] team to get assistance with transportation              Prior to Admission medications    Medication Sig Start Date End Date Taking? Authorizing Provider   albuterol sulfate  (90 Base) MCG/ACT inhaler Inhale 2 puffs into the lungs 4 times daily as needed for Wheezing 7/12/19   Reynold Grove DO   pregabalin (LYRICA) 75 MG capsule Take 1 capsule by mouth 2 times daily for 30 days.  7/8/19 8/7/19  Reynold Grove DO   traZODone (DESYREL) 300 MG tablet Take 1 tablet by mouth nightly 7/8/19   Reynold Grove DO   atorvastatin (LIPITOR) 40 MG tablet Take 1 tablet by mouth daily 7/8/19   Reynold Grove DO   metFORMIN (GLUCOPHAGE) 500 MG tablet Take 1 tablet by mouth 2 times daily (with meals) 5/30/19   Reynold Grove DO   TRUEPLUS LANCETS 30G MISC Inject 1 each into the skin 3 times daily TO CHECK FLUCTUATING BLOOD SUGARS IE HYPERGLYCEMIA 5/30/19   Reynold Grove DO   pantoprazole (PROTONIX) 40 MG tablet TAKE 1 TABLET BY MOUTH EVERY MORNING BEFORE BREAKFAST 4/24/19   Reynold Grove DO   tamsulosin (FLOMAX) 0.4 MG

## 2019-07-22 ENCOUNTER — TELEPHONE (OUTPATIENT)
Dept: FAMILY MEDICINE CLINIC | Age: 64
End: 2019-07-22

## 2019-07-22 RX ORDER — ALBUTEROL SULFATE 90 UG/1
2 AEROSOL, METERED RESPIRATORY (INHALATION) EVERY 6 HOURS PRN
Qty: 1 INHALER | Refills: 3 | Status: SHIPPED | OUTPATIENT
Start: 2019-07-22 | End: 2019-11-11 | Stop reason: SDUPTHER

## 2019-07-22 NOTE — TELEPHONE ENCOUNTER
INSURANCE WILL NOT COVER ALBUTEROL THEY WANT YOU TO CHANGE TO VENTOLIN INHALER CAN YOU SEND IN SCRIPT

## 2019-07-29 ENCOUNTER — CARE COORDINATION (OUTPATIENT)
Dept: CARE COORDINATION | Age: 64
End: 2019-07-29

## 2019-07-29 RX ORDER — ALBUTEROL SULFATE 90 UG/1
2 AEROSOL, METERED RESPIRATORY (INHALATION) EVERY 4 HOURS PRN
Qty: 1 INHALER | Refills: 5 | Status: SHIPPED | OUTPATIENT
Start: 2019-07-29 | End: 2019-11-11

## 2019-07-29 NOTE — CARE COORDINATION
Attempting to reach patient for a follow up care coordination call. Left detailed message for the patient to return my call with any questions or concerns. Noted patient has appointment with diabetes education tomorrow.

## 2019-07-29 NOTE — TELEPHONE ENCOUNTER
Patient's insurance will not cover albuterol inhaler they will cover the ventolin inhaler can you change medication

## 2019-07-30 ENCOUNTER — TELEPHONE (OUTPATIENT)
Dept: PHARMACY | Age: 64
End: 2019-07-30

## 2019-08-05 ENCOUNTER — CARE COORDINATION (OUTPATIENT)
Dept: CARE COORDINATION | Age: 64
End: 2019-08-05

## 2019-08-06 ENCOUNTER — APPOINTMENT (OUTPATIENT)
Dept: CT IMAGING | Age: 64
End: 2019-08-06
Payer: MEDICARE

## 2019-08-06 ENCOUNTER — HOSPITAL ENCOUNTER (EMERGENCY)
Age: 64
Discharge: HOME | End: 2019-08-06
Attending: EMERGENCY MEDICINE
Payer: MEDICARE

## 2019-08-06 VITALS
TEMPERATURE: 97.9 F | BODY MASS INDEX: 23.19 KG/M2 | RESPIRATION RATE: 22 BRPM | OXYGEN SATURATION: 94 % | HEIGHT: 73 IN | DIASTOLIC BLOOD PRESSURE: 73 MMHG | SYSTOLIC BLOOD PRESSURE: 117 MMHG | WEIGHT: 175 LBS | HEART RATE: 83 BPM

## 2019-08-06 DIAGNOSIS — S09.90XA INJURY OF HEAD, INITIAL ENCOUNTER: ICD-10-CM

## 2019-08-06 DIAGNOSIS — F10.920 ACUTE ALCOHOLIC INTOXICATION WITHOUT COMPLICATION (HCC): Primary | ICD-10-CM

## 2019-08-06 LAB
ABSOLUTE EOS #: 0.19 K/UL (ref 0–0.44)
ABSOLUTE IMMATURE GRANULOCYTE: 0.12 K/UL (ref 0–0.3)
ABSOLUTE LYMPH #: 3.27 K/UL (ref 1.1–3.7)
ABSOLUTE MONO #: 0.54 K/UL (ref 0.1–1.2)
ANION GAP SERPL CALCULATED.3IONS-SCNC: 18 MMOL/L (ref 9–17)
BASOPHILS # BLD: 1 % (ref 0–2)
BASOPHILS ABSOLUTE: 0.08 K/UL (ref 0–0.2)
BUN BLDV-MCNC: 9 MG/DL (ref 8–23)
BUN/CREAT BLD: 15 (ref 9–20)
CALCIUM SERPL-MCNC: 8.9 MG/DL (ref 8.6–10.4)
CHLORIDE BLD-SCNC: 91 MMOL/L (ref 98–107)
CO2: 21 MMOL/L (ref 20–31)
CREAT SERPL-MCNC: 0.59 MG/DL (ref 0.7–1.2)
DIFFERENTIAL TYPE: ABNORMAL
EOSINOPHILS RELATIVE PERCENT: 2 % (ref 1–4)
ETHANOL PERCENT: 0.27 %
ETHANOL: 268 MG/DL
GFR AFRICAN AMERICAN: >60 ML/MIN
GFR NON-AFRICAN AMERICAN: >60 ML/MIN
GFR SERPL CREATININE-BSD FRML MDRD: ABNORMAL ML/MIN/{1.73_M2}
GFR SERPL CREATININE-BSD FRML MDRD: ABNORMAL ML/MIN/{1.73_M2}
GLUCOSE BLD-MCNC: 278 MG/DL (ref 70–99)
HCT VFR BLD CALC: 43.4 % (ref 40.7–50.3)
HEMOGLOBIN: 13.8 G/DL (ref 13–17)
IMMATURE GRANULOCYTES: 1 %
LYMPHOCYTES # BLD: 30 % (ref 24–43)
MCH RBC QN AUTO: 29.2 PG (ref 25.2–33.5)
MCHC RBC AUTO-ENTMCNC: 31.8 G/DL (ref 28.4–34.8)
MCV RBC AUTO: 91.9 FL (ref 82.6–102.9)
MONOCYTES # BLD: 5 % (ref 3–12)
NRBC AUTOMATED: 0 PER 100 WBC
PDW BLD-RTO: 12.7 % (ref 11.8–14.4)
PLATELET # BLD: 442 K/UL (ref 138–453)
PLATELET ESTIMATE: ABNORMAL
PMV BLD AUTO: 8.8 FL (ref 8.1–13.5)
POTASSIUM SERPL-SCNC: 4 MMOL/L (ref 3.7–5.3)
RBC # BLD: 4.72 M/UL (ref 4.21–5.77)
RBC # BLD: ABNORMAL 10*6/UL
SEG NEUTROPHILS: 61 % (ref 36–65)
SEGMENTED NEUTROPHILS ABSOLUTE COUNT: 6.87 K/UL (ref 1.5–8.1)
SODIUM BLD-SCNC: 130 MMOL/L (ref 135–144)
WBC # BLD: 11.1 K/UL (ref 3.5–11.3)
WBC # BLD: ABNORMAL 10*3/UL

## 2019-08-06 PROCEDURE — 85025 COMPLETE CBC W/AUTO DIFF WBC: CPT

## 2019-08-06 PROCEDURE — 99284 EMERGENCY DEPT VISIT MOD MDM: CPT

## 2019-08-06 PROCEDURE — 2500000003 HC RX 250 WO HCPCS: Performed by: NURSE PRACTITIONER

## 2019-08-06 PROCEDURE — 70450 CT HEAD/BRAIN W/O DYE: CPT

## 2019-08-06 PROCEDURE — 96365 THER/PROPH/DIAG IV INF INIT: CPT

## 2019-08-06 PROCEDURE — 6360000002 HC RX W HCPCS: Performed by: NURSE PRACTITIONER

## 2019-08-06 PROCEDURE — 72125 CT NECK SPINE W/O DYE: CPT

## 2019-08-06 PROCEDURE — 36415 COLL VENOUS BLD VENIPUNCTURE: CPT

## 2019-08-06 PROCEDURE — 2580000003 HC RX 258: Performed by: NURSE PRACTITIONER

## 2019-08-06 PROCEDURE — 80048 BASIC METABOLIC PNL TOTAL CA: CPT

## 2019-08-06 PROCEDURE — G0480 DRUG TEST DEF 1-7 CLASSES: HCPCS

## 2019-08-06 RX ORDER — 0.9 % SODIUM CHLORIDE 0.9 %
1000 INTRAVENOUS SOLUTION INTRAVENOUS ONCE
Status: COMPLETED | OUTPATIENT
Start: 2019-08-06 | End: 2019-08-06

## 2019-08-06 RX ADMIN — FOLIC ACID: 5 INJECTION, SOLUTION INTRAMUSCULAR; INTRAVENOUS; SUBCUTANEOUS at 20:21

## 2019-08-06 RX ADMIN — SODIUM CHLORIDE 1000 ML: 9 INJECTION, SOLUTION INTRAVENOUS at 19:02

## 2019-08-06 SDOH — HEALTH STABILITY: MENTAL HEALTH: HOW OFTEN DO YOU HAVE A DRINK CONTAINING ALCOHOL?: NEVER
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
## 2019-08-07 ENCOUNTER — CARE COORDINATION (OUTPATIENT)
Dept: CARE COORDINATION | Age: 64
End: 2019-08-07

## 2019-08-07 NOTE — ED PROVIDER NOTES
Head: Head is without abrasion and without contusion. Right Ear: Tympanic membrane normal.   Left Ear: Tympanic membrane normal.   Mouth/Throat: Oropharynx is clear and moist and mucous membranes are normal.   Eyes: Pupils are equal, round, and reactive to light. Conjunctivae and EOM are normal.   Neck: Normal range of motion. Neck supple. No spinous process tenderness present. Cardiovascular: Normal rate and regular rhythm. Pulmonary/Chest: Effort normal and breath sounds normal.   Abdominal: Soft. Bowel sounds are normal. There is no tenderness. Musculoskeletal: Normal range of motion. He exhibits no edema, tenderness or deformity. Lymphadenopathy:     He has no cervical adenopathy. Neurological:   He is drowsy but awake. He is oriented to self. Speech is slightly slurred and he appears intoxicated. He knows that he is at the hospital but does not know how he got here. He is able to provide a limited history. Is following commands appropriately. Strong equal hand grasp. No cranial nerve deficit. Skin: Skin is warm and dry. DIAGNOSTIC RESULTS     EKG: All EKG's are interpreted by the Emergency Department Physician who either signs or Co-signs this chart in the absence of a cardiologist.    RADIOLOGY:   Non-plain film images such as CT, Ultrasound and MRI are read by the radiologist. Plain radiographic images are visualized and preliminarily interpreted by the emergency physician with the below findings:    Interpretation per the Radiologist below, if available at the time of this note:    CT Head WO Contrast   Final Result   No acute intracranial abnormality. Mild chronic ischemic changes as above. CT CERVICAL SPINE WO CONTRAST   Final Result   No acute abnormality of the cervical spine. Incomplete visualization of a dilated cervical and upper thoracic esophagus,   likely a gastric pull-through procedure. Clinical correlation recommended. LABS:  Labs Reviewed   CBC WITH AUTO DIFFERENTIAL - Abnormal; Notable for the following components:       Result Value    Immature Granulocytes 1 (*)     All other components within normal limits   BASIC METABOLIC PANEL - Abnormal; Notable for the following components:    Glucose 278 (*)     CREATININE 0.59 (*)     Sodium 130 (*)     Chloride 91 (*)     Anion Gap 18 (*)     All other components within normal limits   ETHANOL - Abnormal; Notable for the following components:    Ethanol 268 (*)     Ethanol percent 0.268 (*)     All other components within normal limits       All other labs were within normal range or not returned as of this dictation. EMERGENCY DEPARTMENT COURSE and DIFFERENTIAL DIAGNOSIS/MDM:   Vitals:    Vitals:    19 1844 19 1857 19 1925 19 1940   BP: 134/76 119/70 125/61 117/73   Pulse: 85 83 82 83   Resp:    Temp: 97.9 °F (36.6 °C)      TempSrc: Oral      SpO2: 95% 95% 90% 94%   Weight: 175 lb (79.4 kg)      Height: 6' 1\" (1.854 m)          Medical Decision Makin-year-old male with alcohol intoxication and possible head injury. He was hydrated with normal saline and medicated with folic acid and thiamine. Alcohol levels 0.268. Additional blood work is unremarkable. He was monitored for almost 5 hours and eventually discharged home into the care of a family member. FINAL IMPRESSION      1. Acute alcoholic intoxication without complication ([facility]    2.  Injury of head, initial encounter          DISPOSITION/PLAN   DISPOSITION Decision To Discharge 2019 09:46:39 PM      PATIENT REFERRED TO:   DO Rasheeda Gustafson [facility] [employer] [facility] [address]  [phone]    Schedule an appointment as soon as possible for a visit         DISCHARGE MEDICATIONS:     Discharge Medication List as of 2019  9:47 PM            (Please note that portions of this note were completed with a voice recognition program.  Efforts were made to edit the

## 2019-08-14 ENCOUNTER — CARE COORDINATION (OUTPATIENT)
Dept: CARE COORDINATION | Age: 64
End: 2019-08-14

## 2019-08-20 ENCOUNTER — TELEPHONE (OUTPATIENT)
Dept: PHARMACY | Age: 64
End: 2019-08-20

## 2019-08-21 ENCOUNTER — TELEPHONE (OUTPATIENT)
Dept: PHARMACY | Age: 64
End: 2019-08-21

## 2019-08-29 ENCOUNTER — TELEPHONE (OUTPATIENT)
Dept: PHARMACY | Age: 64
End: 2019-08-29

## 2019-10-17 ENCOUNTER — TELEPHONE (OUTPATIENT)
Dept: FAMILY MEDICINE CLINIC | Age: 64
End: 2019-10-17

## 2019-11-05 ENCOUNTER — TELEPHONE (OUTPATIENT)
Dept: FAMILY MEDICINE CLINIC | Age: 64
End: 2019-11-05

## 2019-11-11 ENCOUNTER — OFFICE VISIT (OUTPATIENT)
Dept: FAMILY MEDICINE CLINIC | Age: 64
End: 2019-11-11
Payer: MEDICARE

## 2019-11-11 VITALS
RESPIRATION RATE: 18 BRPM | OXYGEN SATURATION: 96 % | SYSTOLIC BLOOD PRESSURE: 128 MMHG | HEIGHT: 73 IN | DIASTOLIC BLOOD PRESSURE: 62 MMHG | BODY MASS INDEX: 21.74 KG/M2 | WEIGHT: 164 LBS | HEART RATE: 89 BPM | TEMPERATURE: 96.6 F

## 2019-11-11 DIAGNOSIS — Z79.4 TYPE 2 DIABETES MELLITUS WITH DIABETIC POLYNEUROPATHY, WITH LONG-TERM CURRENT USE OF INSULIN (HCC): ICD-10-CM

## 2019-11-11 DIAGNOSIS — Z91.199 NONCOMPLIANCE: ICD-10-CM

## 2019-11-11 DIAGNOSIS — Z13.220 SCREENING FOR HYPERLIPIDEMIA: Primary | ICD-10-CM

## 2019-11-11 DIAGNOSIS — E11.42 TYPE 2 DIABETES MELLITUS WITH DIABETIC POLYNEUROPATHY, WITH LONG-TERM CURRENT USE OF INSULIN (HCC): ICD-10-CM

## 2019-11-11 DIAGNOSIS — M79.2 NEUROPATHIC PAIN OF RIGHT LOWER EXTREMITY: ICD-10-CM

## 2019-11-11 LAB — HBA1C MFR BLD: 11.3 %

## 2019-11-11 PROCEDURE — 83036 HEMOGLOBIN GLYCOSYLATED A1C: CPT | Performed by: INTERNAL MEDICINE

## 2019-11-11 PROCEDURE — G8427 DOCREV CUR MEDS BY ELIG CLIN: HCPCS | Performed by: INTERNAL MEDICINE

## 2019-11-11 PROCEDURE — 2022F DILAT RTA XM EVC RTNOPTHY: CPT | Performed by: INTERNAL MEDICINE

## 2019-11-11 PROCEDURE — G8420 CALC BMI NORM PARAMETERS: HCPCS | Performed by: INTERNAL MEDICINE

## 2019-11-11 PROCEDURE — 99213 OFFICE O/P EST LOW 20 MIN: CPT | Performed by: INTERNAL MEDICINE

## 2019-11-11 PROCEDURE — 3017F COLORECTAL CA SCREEN DOC REV: CPT | Performed by: INTERNAL MEDICINE

## 2019-11-11 PROCEDURE — G8598 ASA/ANTIPLAT THER USED: HCPCS | Performed by: INTERNAL MEDICINE

## 2019-11-11 PROCEDURE — G8484 FLU IMMUNIZE NO ADMIN: HCPCS | Performed by: INTERNAL MEDICINE

## 2019-11-11 PROCEDURE — 4004F PT TOBACCO SCREEN RCVD TLK: CPT | Performed by: INTERNAL MEDICINE

## 2019-11-11 PROCEDURE — 3046F HEMOGLOBIN A1C LEVEL >9.0%: CPT | Performed by: INTERNAL MEDICINE

## 2019-11-11 RX ORDER — ATORVASTATIN CALCIUM 40 MG/1
40 TABLET, FILM COATED ORAL DAILY
Qty: 90 TABLET | Refills: 3 | Status: ON HOLD | OUTPATIENT
Start: 2019-11-11 | End: 2020-02-24

## 2019-11-11 RX ORDER — ALBUTEROL SULFATE 90 UG/1
2 AEROSOL, METERED RESPIRATORY (INHALATION) EVERY 6 HOURS PRN
Qty: 1 INHALER | Refills: 3 | Status: SHIPPED | OUTPATIENT
Start: 2019-11-11 | End: 2020-05-28 | Stop reason: SDUPTHER

## 2020-01-17 ENCOUNTER — TELEPHONE (OUTPATIENT)
Dept: VASCULAR SURGERY | Age: 65
End: 2020-01-17

## 2020-01-22 ENCOUNTER — TELEPHONE (OUTPATIENT)
Dept: FAMILY MEDICINE CLINIC | Age: 65
End: 2020-01-22

## 2020-02-23 ENCOUNTER — APPOINTMENT (OUTPATIENT)
Dept: GENERAL RADIOLOGY | Age: 65
DRG: 191 | End: 2020-02-23
Payer: MEDICARE

## 2020-02-23 ENCOUNTER — HOSPITAL ENCOUNTER (INPATIENT)
Age: 65
LOS: 2 days | Discharge: HOME HEALTH | DRG: 191 | End: 2020-02-26
Attending: EMERGENCY MEDICINE | Admitting: INTERNAL MEDICINE
Payer: MEDICARE

## 2020-02-23 LAB
ABSOLUTE EOS #: 0.11 K/UL (ref 0–0.44)
ABSOLUTE IMMATURE GRANULOCYTE: 0.08 K/UL (ref 0–0.3)
ABSOLUTE LYMPH #: 2.23 K/UL (ref 1.1–3.7)
ABSOLUTE MONO #: 0.85 K/UL (ref 0.1–1.2)
ANION GAP SERPL CALCULATED.3IONS-SCNC: 14 MMOL/L (ref 9–17)
BASOPHILS # BLD: 1 % (ref 0–2)
BASOPHILS ABSOLUTE: 0.07 K/UL (ref 0–0.2)
BNP INTERPRETATION: NORMAL
BUN BLDV-MCNC: 25 MG/DL (ref 8–23)
BUN/CREAT BLD: 24 (ref 9–20)
CALCIUM SERPL-MCNC: 9.2 MG/DL (ref 8.6–10.4)
CHLORIDE BLD-SCNC: 92 MMOL/L (ref 98–107)
CO2: 22 MMOL/L (ref 20–31)
CREAT SERPL-MCNC: 1.04 MG/DL (ref 0.7–1.2)
DIFFERENTIAL TYPE: ABNORMAL
EOSINOPHILS RELATIVE PERCENT: 1 % (ref 1–4)
GFR AFRICAN AMERICAN: >60 ML/MIN
GFR NON-AFRICAN AMERICAN: >60 ML/MIN
GFR SERPL CREATININE-BSD FRML MDRD: ABNORMAL ML/MIN/{1.73_M2}
GFR SERPL CREATININE-BSD FRML MDRD: ABNORMAL ML/MIN/{1.73_M2}
GLUCOSE BLD-MCNC: 518 MG/DL (ref 70–99)
HCT VFR BLD CALC: 42 % (ref 40.7–50.3)
HEMOGLOBIN: 13.5 G/DL (ref 13–17)
IMMATURE GRANULOCYTES: 1 %
LYMPHOCYTES # BLD: 20 % (ref 24–43)
MCH RBC QN AUTO: 29.7 PG (ref 25.2–33.5)
MCHC RBC AUTO-ENTMCNC: 32.1 G/DL (ref 28.4–34.8)
MCV RBC AUTO: 92.5 FL (ref 82.6–102.9)
MONOCYTES # BLD: 8 % (ref 3–12)
NRBC AUTOMATED: 0 PER 100 WBC
PDW BLD-RTO: 12.7 % (ref 11.8–14.4)
PLATELET # BLD: 409 K/UL (ref 138–453)
PLATELET ESTIMATE: ABNORMAL
PMV BLD AUTO: 9.9 FL (ref 8.1–13.5)
POTASSIUM SERPL-SCNC: 4.6 MMOL/L (ref 3.7–5.3)
PRO-BNP: 165 PG/ML
RBC # BLD: 4.54 M/UL (ref 4.21–5.77)
RBC # BLD: ABNORMAL 10*6/UL
SEG NEUTROPHILS: 69 % (ref 36–65)
SEGMENTED NEUTROPHILS ABSOLUTE COUNT: 7.68 K/UL (ref 1.5–8.1)
SODIUM BLD-SCNC: 128 MMOL/L (ref 135–144)
WBC # BLD: 11 K/UL (ref 3.5–11.3)
WBC # BLD: ABNORMAL 10*3/UL

## 2020-02-23 PROCEDURE — 2580000003 HC RX 258: Performed by: NURSE PRACTITIONER

## 2020-02-23 PROCEDURE — 71045 X-RAY EXAM CHEST 1 VIEW: CPT

## 2020-02-23 PROCEDURE — 83880 ASSAY OF NATRIURETIC PEPTIDE: CPT

## 2020-02-23 PROCEDURE — 36415 COLL VENOUS BLD VENIPUNCTURE: CPT

## 2020-02-23 PROCEDURE — 96375 TX/PRO/DX INJ NEW DRUG ADDON: CPT

## 2020-02-23 PROCEDURE — 96372 THER/PROPH/DIAG INJ SC/IM: CPT

## 2020-02-23 PROCEDURE — 85025 COMPLETE CBC W/AUTO DIFF WBC: CPT

## 2020-02-23 PROCEDURE — 96374 THER/PROPH/DIAG INJ IV PUSH: CPT

## 2020-02-23 PROCEDURE — 80048 BASIC METABOLIC PNL TOTAL CA: CPT

## 2020-02-23 PROCEDURE — 99284 EMERGENCY DEPT VISIT MOD MDM: CPT

## 2020-02-23 PROCEDURE — 6370000000 HC RX 637 (ALT 250 FOR IP): Performed by: NURSE PRACTITIONER

## 2020-02-23 PROCEDURE — 96361 HYDRATE IV INFUSION ADD-ON: CPT

## 2020-02-23 PROCEDURE — 6360000002 HC RX W HCPCS: Performed by: NURSE PRACTITIONER

## 2020-02-23 RX ORDER — KETOROLAC TROMETHAMINE 30 MG/ML
30 INJECTION, SOLUTION INTRAMUSCULAR; INTRAVENOUS ONCE
Status: COMPLETED | OUTPATIENT
Start: 2020-02-23 | End: 2020-02-23

## 2020-02-23 RX ORDER — MORPHINE SULFATE 2 MG/ML
2 INJECTION, SOLUTION INTRAMUSCULAR; INTRAVENOUS ONCE
Status: COMPLETED | OUTPATIENT
Start: 2020-02-23 | End: 2020-02-23

## 2020-02-23 RX ORDER — 0.9 % SODIUM CHLORIDE 0.9 %
1000 INTRAVENOUS SOLUTION INTRAVENOUS ONCE
Status: COMPLETED | OUTPATIENT
Start: 2020-02-23 | End: 2020-02-24

## 2020-02-23 RX ADMIN — Medication 2 MG: at 23:37

## 2020-02-23 RX ADMIN — KETOROLAC TROMETHAMINE 30 MG: 30 INJECTION, SOLUTION INTRAMUSCULAR at 22:25

## 2020-02-23 RX ADMIN — SODIUM CHLORIDE 1000 ML: 9 INJECTION, SOLUTION INTRAVENOUS at 23:33

## 2020-02-23 RX ADMIN — Medication 10 UNITS: at 23:37

## 2020-02-23 ASSESSMENT — PAIN DESCRIPTION - DESCRIPTORS: DESCRIPTORS: THROBBING

## 2020-02-23 ASSESSMENT — ENCOUNTER SYMPTOMS
COUGH: 1
ABDOMINAL PAIN: 0
COLOR CHANGE: 0
BACK PAIN: 0
VOMITING: 0
CHEST TIGHTNESS: 0
NAUSEA: 0

## 2020-02-23 ASSESSMENT — PAIN DESCRIPTION - LOCATION: LOCATION: LEG

## 2020-02-23 ASSESSMENT — PAIN SCALES - GENERAL
PAINLEVEL_OUTOF10: 10
PAINLEVEL_OUTOF10: 9
PAINLEVEL_OUTOF10: 10

## 2020-02-23 ASSESSMENT — PAIN DESCRIPTION - ORIENTATION: ORIENTATION: LEFT;RIGHT

## 2020-02-24 PROBLEM — E11.65 UNCONTROLLED TYPE 2 DIABETES MELLITUS WITH HYPERGLYCEMIA (HCC): Status: ACTIVE | Noted: 2020-02-24

## 2020-02-24 PROBLEM — S88.111A BELOW-KNEE AMPUTATION OF RIGHT LOWER EXTREMITY (HCC): Chronic | Status: ACTIVE | Noted: 2020-02-24

## 2020-02-24 PROBLEM — E87.1 HYPONATREMIA: Status: ACTIVE | Noted: 2020-02-24

## 2020-02-24 PROBLEM — G54.6 PAIN, PHANTOM LIMB (HCC): Status: ACTIVE | Noted: 2020-02-24

## 2020-02-24 PROBLEM — J44.1 COPD EXACERBATION (HCC): Status: ACTIVE | Noted: 2020-02-24

## 2020-02-24 PROBLEM — S88.111A BELOW-KNEE AMPUTATION OF RIGHT LOWER EXTREMITY (HCC): Status: ACTIVE | Noted: 2020-02-24

## 2020-02-24 PROBLEM — E44.0 MODERATE PROTEIN MALNUTRITION (HCC): Status: ACTIVE | Noted: 2020-02-24

## 2020-02-24 PROBLEM — I10 ESSENTIAL HYPERTENSION: Status: ACTIVE | Noted: 2020-02-24

## 2020-02-24 LAB
ESTIMATED AVERAGE GLUCOSE: 395 MG/DL
GLUCOSE BLD-MCNC: 183 MG/DL (ref 75–110)
GLUCOSE BLD-MCNC: 232 MG/DL (ref 75–110)
GLUCOSE BLD-MCNC: 237 MG/DL (ref 75–110)
GLUCOSE BLD-MCNC: 271 MG/DL (ref 75–110)
GLUCOSE BLD-MCNC: 305 MG/DL (ref 75–110)
GLUCOSE BLD-MCNC: 358 MG/DL (ref 75–110)
HBA1C MFR BLD: 15.4 % (ref 4–6)

## 2020-02-24 PROCEDURE — 97116 GAIT TRAINING THERAPY: CPT

## 2020-02-24 PROCEDURE — 6370000000 HC RX 637 (ALT 250 FOR IP): Performed by: NURSE PRACTITIONER

## 2020-02-24 PROCEDURE — 2580000003 HC RX 258: Performed by: NURSE PRACTITIONER

## 2020-02-24 PROCEDURE — 97163 PT EVAL HIGH COMPLEX 45 MIN: CPT

## 2020-02-24 PROCEDURE — 99222 1ST HOSP IP/OBS MODERATE 55: CPT | Performed by: INTERNAL MEDICINE

## 2020-02-24 PROCEDURE — 97167 OT EVAL HIGH COMPLEX 60 MIN: CPT

## 2020-02-24 PROCEDURE — 96376 TX/PRO/DX INJ SAME DRUG ADON: CPT

## 2020-02-24 PROCEDURE — 83036 HEMOGLOBIN GLYCOSYLATED A1C: CPT

## 2020-02-24 PROCEDURE — 94760 N-INVAS EAR/PLS OXIMETRY 1: CPT

## 2020-02-24 PROCEDURE — 96361 HYDRATE IV INFUSION ADD-ON: CPT

## 2020-02-24 PROCEDURE — 82947 ASSAY GLUCOSE BLOOD QUANT: CPT

## 2020-02-24 PROCEDURE — 96372 THER/PROPH/DIAG INJ SC/IM: CPT

## 2020-02-24 PROCEDURE — APPSS60 APP SPLIT SHARED TIME 46-60 MINUTES: Performed by: NURSE PRACTITIONER

## 2020-02-24 PROCEDURE — 97535 SELF CARE MNGMENT TRAINING: CPT

## 2020-02-24 PROCEDURE — 6360000002 HC RX W HCPCS: Performed by: NURSE PRACTITIONER

## 2020-02-24 PROCEDURE — 36415 COLL VENOUS BLD VENIPUNCTURE: CPT

## 2020-02-24 PROCEDURE — 1200000000 HC SEMI PRIVATE

## 2020-02-24 PROCEDURE — 97530 THERAPEUTIC ACTIVITIES: CPT

## 2020-02-24 PROCEDURE — 94640 AIRWAY INHALATION TREATMENT: CPT

## 2020-02-24 RX ORDER — ACETAMINOPHEN 650 MG/1
650 SUPPOSITORY RECTAL EVERY 6 HOURS PRN
Status: DISCONTINUED | OUTPATIENT
Start: 2020-02-24 | End: 2020-02-26 | Stop reason: HOSPADM

## 2020-02-24 RX ORDER — ALBUTEROL SULFATE 2.5 MG/3ML
2.5 SOLUTION RESPIRATORY (INHALATION)
Status: DISCONTINUED | OUTPATIENT
Start: 2020-02-24 | End: 2020-02-26 | Stop reason: HOSPADM

## 2020-02-24 RX ORDER — NICOTINE 21 MG/24HR
1 PATCH, TRANSDERMAL 24 HOURS TRANSDERMAL DAILY PRN
Status: DISCONTINUED | OUTPATIENT
Start: 2020-02-24 | End: 2020-02-26 | Stop reason: HOSPADM

## 2020-02-24 RX ORDER — SODIUM CHLORIDE 0.9 % (FLUSH) 0.9 %
10 SYRINGE (ML) INJECTION EVERY 12 HOURS SCHEDULED
Status: DISCONTINUED | OUTPATIENT
Start: 2020-02-24 | End: 2020-02-26 | Stop reason: HOSPADM

## 2020-02-24 RX ORDER — SODIUM CHLORIDE 9 MG/ML
INJECTION, SOLUTION INTRAVENOUS CONTINUOUS
Status: DISCONTINUED | OUTPATIENT
Start: 2020-02-24 | End: 2020-02-26 | Stop reason: HOSPADM

## 2020-02-24 RX ORDER — POLYETHYLENE GLYCOL 3350 17 G/17G
17 POWDER, FOR SOLUTION ORAL DAILY PRN
Status: DISCONTINUED | OUTPATIENT
Start: 2020-02-24 | End: 2020-02-26 | Stop reason: HOSPADM

## 2020-02-24 RX ORDER — INSULIN GLARGINE 100 [IU]/ML
20 INJECTION, SOLUTION SUBCUTANEOUS 2 TIMES DAILY
Status: DISCONTINUED | OUTPATIENT
Start: 2020-02-24 | End: 2020-02-25

## 2020-02-24 RX ORDER — ACETAMINOPHEN,DIPHENHYDRAMINE HCL 500; 25 MG/1; MG/1
1 TABLET, FILM COATED ORAL NIGHTLY PRN
Status: ON HOLD | COMMUNITY
End: 2020-07-27

## 2020-02-24 RX ORDER — SODIUM CHLORIDE 0.9 % (FLUSH) 0.9 %
10 SYRINGE (ML) INJECTION PRN
Status: DISCONTINUED | OUTPATIENT
Start: 2020-02-24 | End: 2020-02-26 | Stop reason: HOSPADM

## 2020-02-24 RX ORDER — ALBUTEROL SULFATE 2.5 MG/3ML
2.5 SOLUTION RESPIRATORY (INHALATION) ONCE
Status: COMPLETED | OUTPATIENT
Start: 2020-02-24 | End: 2020-02-24

## 2020-02-24 RX ORDER — TRAZODONE HYDROCHLORIDE 100 MG/1
300 TABLET ORAL NIGHTLY
Status: DISCONTINUED | OUTPATIENT
Start: 2020-02-24 | End: 2020-02-26 | Stop reason: HOSPADM

## 2020-02-24 RX ORDER — FAMOTIDINE 20 MG/1
20 TABLET, FILM COATED ORAL 2 TIMES DAILY
Status: DISCONTINUED | OUTPATIENT
Start: 2020-02-24 | End: 2020-02-26 | Stop reason: HOSPADM

## 2020-02-24 RX ORDER — ONDANSETRON 2 MG/ML
4 INJECTION INTRAMUSCULAR; INTRAVENOUS EVERY 6 HOURS PRN
Status: DISCONTINUED | OUTPATIENT
Start: 2020-02-24 | End: 2020-02-26 | Stop reason: HOSPADM

## 2020-02-24 RX ORDER — ACETAMINOPHEN 325 MG/1
650 TABLET ORAL EVERY 6 HOURS PRN
Status: DISCONTINUED | OUTPATIENT
Start: 2020-02-24 | End: 2020-02-26 | Stop reason: HOSPADM

## 2020-02-24 RX ORDER — NICOTINE POLACRILEX 4 MG
15 LOZENGE BUCCAL PRN
Status: DISCONTINUED | OUTPATIENT
Start: 2020-02-24 | End: 2020-02-26 | Stop reason: HOSPADM

## 2020-02-24 RX ORDER — PROMETHAZINE HYDROCHLORIDE 25 MG/1
12.5 TABLET ORAL EVERY 6 HOURS PRN
Status: DISCONTINUED | OUTPATIENT
Start: 2020-02-24 | End: 2020-02-26 | Stop reason: HOSPADM

## 2020-02-24 RX ORDER — DEXTROSE MONOHYDRATE 25 G/50ML
12.5 INJECTION, SOLUTION INTRAVENOUS PRN
Status: DISCONTINUED | OUTPATIENT
Start: 2020-02-24 | End: 2020-02-26 | Stop reason: HOSPADM

## 2020-02-24 RX ORDER — ATORVASTATIN CALCIUM 40 MG/1
40 TABLET, FILM COATED ORAL DAILY
Status: DISCONTINUED | OUTPATIENT
Start: 2020-02-24 | End: 2020-02-26 | Stop reason: HOSPADM

## 2020-02-24 RX ORDER — IPRATROPIUM BROMIDE AND ALBUTEROL SULFATE 2.5; .5 MG/3ML; MG/3ML
1 SOLUTION RESPIRATORY (INHALATION)
Status: DISCONTINUED | OUTPATIENT
Start: 2020-02-24 | End: 2020-02-26 | Stop reason: HOSPADM

## 2020-02-24 RX ORDER — CYCLOBENZAPRINE HCL 10 MG
10 TABLET ORAL 3 TIMES DAILY PRN
Status: DISCONTINUED | OUTPATIENT
Start: 2020-02-24 | End: 2020-02-26 | Stop reason: HOSPADM

## 2020-02-24 RX ORDER — MORPHINE SULFATE 2 MG/ML
2 INJECTION, SOLUTION INTRAMUSCULAR; INTRAVENOUS
Status: DISCONTINUED | OUTPATIENT
Start: 2020-02-24 | End: 2020-02-26

## 2020-02-24 RX ORDER — PREGABALIN 75 MG/1
75 CAPSULE ORAL 2 TIMES DAILY
Status: DISCONTINUED | OUTPATIENT
Start: 2020-02-24 | End: 2020-02-26 | Stop reason: HOSPADM

## 2020-02-24 RX ORDER — DEXTROSE MONOHYDRATE 50 MG/ML
100 INJECTION, SOLUTION INTRAVENOUS PRN
Status: DISCONTINUED | OUTPATIENT
Start: 2020-02-24 | End: 2020-02-26 | Stop reason: HOSPADM

## 2020-02-24 RX ORDER — MORPHINE SULFATE 4 MG/ML
4 INJECTION, SOLUTION INTRAMUSCULAR; INTRAVENOUS
Status: DISCONTINUED | OUTPATIENT
Start: 2020-02-24 | End: 2020-02-26

## 2020-02-24 RX ADMIN — MORPHINE SULFATE 4 MG: 4 INJECTION, SOLUTION INTRAMUSCULAR; INTRAVENOUS at 17:45

## 2020-02-24 RX ADMIN — Medication 10 ML: at 02:56

## 2020-02-24 RX ADMIN — SODIUM CHLORIDE: 9 INJECTION, SOLUTION INTRAVENOUS at 04:14

## 2020-02-24 RX ADMIN — INSULIN GLARGINE 20 UNITS: 100 INJECTION, SOLUTION SUBCUTANEOUS at 20:30

## 2020-02-24 RX ADMIN — FAMOTIDINE 20 MG: 20 TABLET, FILM COATED ORAL at 09:31

## 2020-02-24 RX ADMIN — INSULIN GLARGINE 20 UNITS: 100 INJECTION, SOLUTION SUBCUTANEOUS at 02:41

## 2020-02-24 RX ADMIN — PREGABALIN 75 MG: 75 CAPSULE ORAL at 09:31

## 2020-02-24 RX ADMIN — INSULIN GLARGINE 20 UNITS: 100 INJECTION, SOLUTION SUBCUTANEOUS at 09:29

## 2020-02-24 RX ADMIN — SODIUM CHLORIDE: 9 INJECTION, SOLUTION INTRAVENOUS at 20:35

## 2020-02-24 RX ADMIN — INSULIN LISPRO 6 UNITS: 100 INJECTION, SOLUTION INTRAVENOUS; SUBCUTANEOUS at 02:42

## 2020-02-24 RX ADMIN — MORPHINE SULFATE 4 MG: 4 INJECTION, SOLUTION INTRAMUSCULAR; INTRAVENOUS at 05:24

## 2020-02-24 RX ADMIN — IPRATROPIUM BROMIDE AND ALBUTEROL SULFATE 1 AMPULE: .5; 3 SOLUTION RESPIRATORY (INHALATION) at 20:03

## 2020-02-24 RX ADMIN — MORPHINE SULFATE 4 MG: 4 INJECTION, SOLUTION INTRAMUSCULAR; INTRAVENOUS at 09:30

## 2020-02-24 RX ADMIN — MORPHINE SULFATE 4 MG: 4 INJECTION, SOLUTION INTRAMUSCULAR; INTRAVENOUS at 12:36

## 2020-02-24 RX ADMIN — IPRATROPIUM BROMIDE AND ALBUTEROL SULFATE 1 AMPULE: .5; 3 SOLUTION RESPIRATORY (INHALATION) at 14:54

## 2020-02-24 RX ADMIN — FAMOTIDINE 20 MG: 20 TABLET, FILM COATED ORAL at 02:41

## 2020-02-24 RX ADMIN — ATORVASTATIN CALCIUM 40 MG: 40 TABLET, FILM COATED ORAL at 09:31

## 2020-02-24 RX ADMIN — PREGABALIN 75 MG: 75 CAPSULE ORAL at 20:31

## 2020-02-24 RX ADMIN — ENOXAPARIN SODIUM 40 MG: 40 INJECTION SUBCUTANEOUS at 09:31

## 2020-02-24 RX ADMIN — INSULIN LISPRO 3 UNITS: 100 INJECTION, SOLUTION INTRAVENOUS; SUBCUTANEOUS at 09:28

## 2020-02-24 RX ADMIN — INSULIN LISPRO 5 UNITS: 100 INJECTION, SOLUTION INTRAVENOUS; SUBCUTANEOUS at 20:31

## 2020-02-24 RX ADMIN — TRAZODONE HYDROCHLORIDE 300 MG: 100 TABLET ORAL at 20:32

## 2020-02-24 RX ADMIN — CYCLOBENZAPRINE HYDROCHLORIDE 10 MG: 10 TABLET, FILM COATED ORAL at 05:08

## 2020-02-24 RX ADMIN — TRAZODONE HYDROCHLORIDE 300 MG: 100 TABLET ORAL at 02:40

## 2020-02-24 RX ADMIN — PREGABALIN 75 MG: 75 CAPSULE ORAL at 02:41

## 2020-02-24 RX ADMIN — MORPHINE SULFATE 2 MG: 2 INJECTION, SOLUTION INTRAMUSCULAR; INTRAVENOUS at 20:32

## 2020-02-24 RX ADMIN — MORPHINE SULFATE 4 MG: 4 INJECTION, SOLUTION INTRAMUSCULAR; INTRAVENOUS at 02:56

## 2020-02-24 RX ADMIN — INSULIN LISPRO 6 UNITS: 100 INJECTION, SOLUTION INTRAVENOUS; SUBCUTANEOUS at 17:10

## 2020-02-24 RX ADMIN — FAMOTIDINE 20 MG: 20 TABLET, FILM COATED ORAL at 20:32

## 2020-02-24 RX ADMIN — INSULIN LISPRO 6 UNITS: 100 INJECTION, SOLUTION INTRAVENOUS; SUBCUTANEOUS at 12:16

## 2020-02-24 RX ADMIN — ALBUTEROL SULFATE 2.5 MG: 2.5 SOLUTION RESPIRATORY (INHALATION) at 00:27

## 2020-02-24 ASSESSMENT — PAIN DESCRIPTION - LOCATION
LOCATION: LEG
LOCATION: LEG
LOCATION: HIP;LEG
LOCATION: LEG

## 2020-02-24 ASSESSMENT — PAIN SCALES - GENERAL
PAINLEVEL_OUTOF10: 9
PAINLEVEL_OUTOF10: 3
PAINLEVEL_OUTOF10: 5
PAINLEVEL_OUTOF10: 8
PAINLEVEL_OUTOF10: 6
PAINLEVEL_OUTOF10: 4
PAINLEVEL_OUTOF10: 6
PAINLEVEL_OUTOF10: 8
PAINLEVEL_OUTOF10: 7
PAINLEVEL_OUTOF10: 6
PAINLEVEL_OUTOF10: 9
PAINLEVEL_OUTOF10: 8
PAINLEVEL_OUTOF10: 8

## 2020-02-24 ASSESSMENT — PAIN DESCRIPTION - PAIN TYPE
TYPE: ACUTE PAIN

## 2020-02-24 ASSESSMENT — ENCOUNTER SYMPTOMS
VOMITING: 0
WHEEZING: 0
COUGH: 1
SHORTNESS OF BREATH: 1
CONSTIPATION: 0
DIARRHEA: 0
NAUSEA: 1
ABDOMINAL PAIN: 0
STRIDOR: 0
BLOOD IN STOOL: 0

## 2020-02-24 ASSESSMENT — PAIN DESCRIPTION - DESCRIPTORS
DESCRIPTORS: THROBBING

## 2020-02-24 ASSESSMENT — PAIN DESCRIPTION - ORIENTATION
ORIENTATION: RIGHT
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
## 2020-02-24 NOTE — PLAN OF CARE
Problem: Pain:  Goal: Pain level will decrease  Description  Pain level will decrease  Outcome: Ongoing  Note:   Pt stated some relief from prn pain meds given       Problem: Discharge Planning:  Goal: Discharged to appropriate level of care  Description  Discharged to appropriate level of care  Outcome: Ongoing     Problem:  Activity Intolerance:  Goal: Ability to tolerate increased activity will improve  Description  Ability to tolerate increased activity will improve  Outcome: Ongoing     Problem: Airway Clearance - Ineffective:  Goal: Ability to maintain a clear airway will improve  Description  Ability to maintain a clear airway will improve  Outcome: Ongoing  Note:   No respiratory distress noted     Problem: Breathing Pattern - Ineffective:  Goal: Ability to achieve and maintain a regular respiratory rate will improve  Description  Ability to achieve and maintain a regular respiratory rate will improve  Outcome: Ongoing     Problem: Falls - Risk of:  Goal: Will remain free from falls  Description  Will remain free from falls  Outcome: Ongoing  Note:   Bed alarm in use, call bell in reach -- encouraged to call prn       Problem: Skin Integrity:  Goal: Will show no infection signs and symptoms  Description  Will show no infection signs and symptoms  Outcome: Ongoing     Problem: Gas Exchange - Impaired:  Goal: Levels of oxygenation will improve  Description  Levels of oxygenation will improve  Note:   Sats in mid 90's on room air

## 2020-02-24 NOTE — PROGRESS NOTES
Direct oral anticoagulant (DOAC) - Initial Pharmacy Review  PLAN    According to nursing the patient has not taken Apixaban in months due to affordability issues. Contacting RICHARD Mauricio to clarify if still want apixaban continued and to clarify dosage. Apixaban dosage for DVT/PE Recurrence Prevention is usually 5mg BID (or potentially 2.5mg BID after at least 6 months of treatment). RUTH SILVESTRE  2/24/2020  2:03 AM    ASSESSMENT   Patient chart reviewed for indication and appropriateness of dose and therapy of Apixaban.:  Indication: History of DVT/PE. PATIENT INFORMATION   Body mass index is 20.96 kg/m². Wt Readings from Last 1 Encounters:   02/24/20 158 lb 14.4 oz (72.1 kg)     Some sources recommend that DOACs be avoided in weight < 50 or > 120 kg, or BMI > 40 whenever possible; however. some smaller studies suggest that DOACs may be safe and efficacious in this population. Recent Labs     02/23/20  2215   CREATININE 1.04     Recent Labs     02/23/20  2215   HGB 13.5   HCT 42.0        No results for input(s): INR in the last 72 hours. Weight  kg ? BMI Less than   40 kg/m2 Calculated CrCl  Using ABW (mL/min) Other anticoagulants on MAR Drug interactions  (since admission) reviewed   yes    Wt Readings from Last 1 Encounters:   02/24/20 158 lb 14.4 oz (72.1 kg)    yes    Body mass index is 20.96 kg/m². Apixaban for Afib is reviewed separately        (140-age)*ABW    72 * sCr    X 0.85 for females No concurrent anticoagulants ordered.  No interactions/no new drug interactions identified requiring action.                 -----------------------------------------------------------------------------------------------------------------    CRCL Calculation for DOACs  (use ABW)    CrCl male:  (140-Age) * ABW           For female:  (140-Age) * ABW * 0.85                       72  *  sCr                                              72 * sCr           Apixaban (Eliquis)  Indication Dose (Oral) Renal Adjustment (CrCl calculated on ABW) [facility] Drug Interactions   NVAF 5mg BID 2.5mg BID  IF patient has TWO of the following:  Age>80, Weight <60 kg, SCr > 1.5  (If dialysis, but not meeting above criteria, keep 5 mg BID*)   Strong P-gp/CY inducers (Avoid combination)  Apalutamide, CarBAMazepine, Enzalutamide, Fosphenytoin, Lumacaftor, Mitotane, PHENobarbital, Phenytoin, Primidone, RifAMPin    P-gp/CY inhibitors (Avoid use or dose adjustment)  Clarithromycin, Itraconazole, Ketoconazole (systemic), Ombitasvir, Paritaprevir, Ritonavir       DVT/PE  Treatment 10mg BID x7d, then 5mg BID No dosage adjustment necessary; however, patients with a serum creatinine >2.5 mg/dL or CrCl <25 mL/minute (as determined by Cockcroft-Gault equation) were excluded from the clinical trials*    DVT/PE  Recurrence Preventions 5mg BID (or potentially 2.5mg BID after at least 6 months of treatment)     DVT prevention PostOp Knee: 2.5mg BID x 12d  Hip: 2.5mg BID x 35d No dosage adjustment necessary; however, patients with either clinically significant renal impairment, impaired renal function, or CrCl <30 mL/minute were excluded from the respective clinical trials. For patients receiving dual strong CY and P-glycoprotein inhibitors (eg, clarithromycin, ketoconazole, itraconazole, ritonavir) and apixaban doses >2.5 mg twice daily, reduce apixaban dose by 50%   *(Salvador, 2013a;  Salvador, 2013b)    Rivaroxaban (Xarelto®)  - (CrCl calculated on ABW)  Indication Dose (Oral)  Adjustment  (CrCl calculated on ABW) Drug Interactions   NVAF 20mg daily   (with evening meal) CrCl <  50 mL/min = 15mg/day (with evening meal)   Strong P-gp/CY inducers (Avoid combination  Apalutamide, CarBAMazepine, Enzalutamide, Fosphenytoin, Lumacaftor, Mitotane, PHENobarbital, Phenytoin, Primidone, RifAMPin    P-gp/CY inhibitors (Avoid combination)  Clarithromycin, Itraconazole, Ketoconazole (systemic), Ombitasvir, Paritaprevir, Ritonavir, Belwood's wort   DVT/PE Treatment 15mg BID x21d, then 20mg daily (take with food) CrCl< 15 =Avoid use    DVT/PE  Recurrence Preventions 20mg daily (or 10 mg daily  after at least 6 months of treatment) CrCl< 15 =Avoid use    DVT prevention PostOp Knee: 10mg daily x 12-14d   (up to 35 days suggested)  Hip: 10mg daily x 35 days CrCl <15 =Avoid use    CAD/PAD  (Chronic, stable) 2.5mg BID with once daily ASA 75-100mg No adjustment for > 15 mL/min. Use with caution in severe renal impairment. * Lino THOMAS et al. J Am Soc Nephrol 2017. doi:10.1681/ASN. [id number]  *Kylie SPRINGER, et al. Circulation 2018.  AZZ:60.6325/[id number]  Gianfranco Glover et al. [facility] 2019  Sasha Ludin M, et al. Blood 5445;380:[id number]  Rennyo Viktoria, et al. CHEST 2018  Sheryle Kaufman, et al. Lydia Pharmacother 2019  [facility], [place])/GRICELDA et al. Circulation 2018

## 2020-02-24 NOTE — ED NOTES
Pt arrived to ED via private auto with c/o bilateral leg pain, onset one week ago. Pt denies any injury. Pt has hx of diabetic neuropathy and states pain fells similar to that. Pt has right knee amputation. No chest pain, no body aches. Pt afebrile, vitals stable. Rates pain 10/10.       Kelvin Tomas, RN  02/23/20 4810
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
## 2020-02-24 NOTE — PROGRESS NOTES
Transitions of Care Pharmacy Service   Medication Review    The patient's list of current home medications has been reviewed. Source(s) of information: SureScripts and patient     Based on information provided by the above source(s), I have updated the patient's home med list as described below. Please review the ACTION REQUESTED section of this note for any discrepancies on current hospital orders. I changed or updated the following medications on the patient's home medication list:  Added · Tylenol PM - per patient uses once daily    Discontinued · Atorvastatin - patient states he hasn't used in [place] long time\" due to cost   · Insulin degludec - patient hasn't used due to cost in months   · Insulin glargine - patient hasn't used due to cost in months  · Pregabalin - patient thinks he needs it, but hasn't used due to cost in a few months or more   · Trazodone - thinks he needs it, but has been off of this for months   Other Notes   · Patient has never had a clot (PE noted in 2017 turned out to be false diagnosis per discharge summary)  - Eliquis is being used for VTE prophylaxis due to his history of PAD/PVD. He can not afford medication, and it has been about 2 months since he has had a dose. Left on med list with directions of BID as indicated for further evaluation by vascular. Spoke with RN about this situation so she is aware when vascular rounds. Please feel free to call me with any questions about this encounter. Thank you. This note will be reviewed and co-signed by the Transitions of Care Pharmacist.    Simone Florian, PharmD student  Transitions of Care Pharmacy Service  Phone:  [phone]  Fax: [phone]      Electronically signed by Simone Florian on 2/24/2020 at 12:04 PM       Prior to Admission medications    Medication Sig Start Date End Date Taking?  Authorizing Provider   diphenhydrAMINE-APAP, sleep, (TYLENOL PM EXTRA STRENGTH)  MG tablet Take 1 tablet by mouth nightly as needed for Sleep   Yes Historical Provider, MD   metFORMIN (GLUCOPHAGE) 500 MG tablet Take 1 tablet by mouth 2 times daily (with meals)  Patient taking differently: Take 500 mg by mouth 2 times daily (with meals) Ran out yesterday 11/11/19  Yes Hanna Moralez, DO   apixaban (ELIQUIS) 5 MG TABS tablet Take 1 tablet by mouth twice daily 11/13/18  Yes Hanna Moralez, DO   albuterol sulfate HFA (VENTOLIN HFA) 108 (90 Base) MCG/ACT inhaler Inhale 2 puffs into the lungs every 6 hours as needed for Wheezing 11/11/19   Hanna Naomy, DO   TRUEPLUS LANCETS 30G MISC Inject 1 each into the skin 3 times daily TO CHECK FLUCTUATING BLOOD SUGARS IE HYPERGLYCEMIA 5/30/19   Hanna Naomy, DO   blood glucose test strips (ASCENSIA AUTODISC VI;ONE TOUCH ULTRA TEST VI) strip TID TO CHECK ELEVATED BLOOD SUGARS Use with associated glucose meter.  4/23/19   Hanna Naomy, DO   glucose monitoring kit (FREESTYLE) monitoring kit 1 kit by Does not apply route daily To check hyperglycemia /fluctating sugars 4/23/19   Hanna Naomy, DO   Insulin Pen Needle 32G X 4 MM MISC 1 each by Does not apply route daily 1/14/19   Hanna Moralez, DO

## 2020-02-24 NOTE — CARE COORDINATION
Social work: Noted PT/OT recommendation of SNF. Met with patient, he is agreeable, gave choices of 1. [facility] 2. [facility]. Referral to both to check benefits- will need precert and hens prior to dc.

## 2020-02-24 NOTE — PROGRESS NOTES
Nutrition Assessment    Type and Reason for Visit: Initial, Positive Nutrition Screen(wounds, weight loss)    Nutrition Recommendations: Continue current diet. Add Glucerna with meals. Monitor intake, weight. Nutrition Assessment: Pt is on a carb control diet. He reports poor appetite now and prior to admission for a couple of days. He reports weight loss of ~10# over ~3 months. Has tried Glucerna in the past and is willing to drink it again. Malnutrition Assessment:  · Malnutrition Status: Meets the criteria for moderate malnutrition  · Context: Chronic illness  · Findings of the 6 clinical characteristics of malnutrition (Minimum of 2 out of 6 clinical characteristics is required to make the diagnosis of moderate or severe Protein Calorie Malnutrition based on AND/ASPEN Guidelines):  1. Energy Intake-Less than or equal to 75% of estimated energy requirement, Greater than or equal to 7 days    2. Weight Loss-5% loss or greater, in 3 months  3. Fat Loss-Moderate subcutaneous fat loss, Orbital, Fat overlying the ribs  4. Muscle Loss-Moderate muscle mass loss, Clavicles (pectoralis and deltoids), Temples (temporalis muscle), Scapula (trapezius)  5. Fluid Accumulation-No significant fluid accumulation,    6.  Strength-Not measured    Nutrition Risk Level: Moderate    Nutrient Needs:  · Estimated Daily Total Kcal: 25 - 30 kcal/kg = 1790 - 2150 kcal/day  · Estimated Daily Protein (g): 1.2 - 1.3 g/kg = 85 - 93 g protein/day  · Estimated Daily Total Fluid (ml/day): 1 ml/kcal    Nutrition Diagnosis:   · Problem:  Moderate malnutrition, In context of chronic illness  · Etiology: related to Insufficient energy/nutrient consumption, Increased demand for energy/nutrients     Signs and symptoms:  as evidenced by Diet history of poor intake, Intake 0-25%, Moderate loss of subcutaneous fat, Moderate muscle loss(5% weight loss x 3 months)    Objective Information:  · Nutrition-Focused Physical Findings:    · Wound
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
## 2020-02-24 NOTE — CARE COORDINATION
Case Management Initial Discharge Plan  Nacho Sorenosn,         Readmission Risk              Risk of Unplanned Readmission:        20             Met with:patient to discuss discharge plans. Information verified: address, contacts, phone number, , insurance Yes  PCP: Jian Cotter DO  Date of last visit: he can't remember    Insurance Provider: [facility] [place] [facility]    Discharge Planning  Current Residence:  Private home  Living Arrangements:  VIGNESH Kunz has 1 stories/2 stairs to climb  Support Systems:  Children  Current Services PTA:  None Agency: none  Patient able to perform ADL's:Independent  DME in home:  Marga Reading, wheelchair, shower bench  DME used to aid ambulation prior to admission:   none  DME used during admission:  TBD    Potential Assistance Needed:  Prescription Assistance    Pharmacy: [facility] [place] [phone]   Potential Assistance Purchasing Medications:  Yes  Does patient want to participate in local refill/ meds to beds program?  Yes    Patient agreeable to home care: No  [place] of choice provided:  n/a      Type of Home Care Services:  None  Patient expects to be discharged to:  home    Prior SNF/Rehab Placement and Facility: [facility]  Agreeable to SNF/Rehab: No  [place] of choice provided: n/a   Evaluation: n/a    Expected Discharge date:  20  Follow Up Appointment: Best Day/ Time:      Transportation provider: self, car is in hospital parking lot  Transportation arrangements needed for discharge: No    Discharge Plan:   Met with pt to review plan of care. Pt lives alone; his daughter lives in [place] and he has thought about moving in with her. Pt receives Meals on Wheels. He drives. He states his only income is Social Security and he has a rental house but it is not rented at this time as it needs repairs and he does not have the money to repair it. 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     Pt states he last got his medications filled at [facility] on [address]; phone call to them and spoke with Eunice Harper the pharmacist and he reports last time pt had any medications filled was in July 2019. Pt states that is not correct as he got Metformin from then a couple months ago. He states he also uses mail order. Pt does not have his drug card with him. Phone call to Roger Ayon at [facility] and she will meet with pt to see if she can approve script assist.    Vascular consult and PT/OT eval are pending. Pt has history of noncompliance with keeping Dr duncan and taking his meds. PLAN  F/U for approval for script assist.  F/U with PT eval in case pt may benefit from home care.         Electronically signed by Winsome Ty on 2/24/20 at 3:14 PM

## 2020-02-24 NOTE — ED TRIAGE NOTES
Arrives via w/c with c/o bilateral leg pain x 1 week and productive cough of white sputum. No further c/o.  No recent injury
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
## 2020-02-25 ENCOUNTER — APPOINTMENT (OUTPATIENT)
Dept: GENERAL RADIOLOGY | Age: 65
DRG: 191 | End: 2020-02-25
Payer: MEDICARE

## 2020-02-25 LAB
ANION GAP SERPL CALCULATED.3IONS-SCNC: 11 MMOL/L (ref 9–17)
BUN BLDV-MCNC: 18 MG/DL (ref 8–23)
BUN/CREAT BLD: 22 (ref 9–20)
CALCIUM SERPL-MCNC: 8.7 MG/DL (ref 8.6–10.4)
CHLORIDE BLD-SCNC: 104 MMOL/L (ref 98–107)
CO2: 24 MMOL/L (ref 20–31)
CREAT SERPL-MCNC: 0.81 MG/DL (ref 0.7–1.2)
GFR AFRICAN AMERICAN: >60 ML/MIN
GFR NON-AFRICAN AMERICAN: >60 ML/MIN
GFR SERPL CREATININE-BSD FRML MDRD: ABNORMAL ML/MIN/{1.73_M2}
GFR SERPL CREATININE-BSD FRML MDRD: ABNORMAL ML/MIN/{1.73_M2}
GLUCOSE BLD-MCNC: 241 MG/DL (ref 75–110)
GLUCOSE BLD-MCNC: 275 MG/DL (ref 75–110)
GLUCOSE BLD-MCNC: 286 MG/DL (ref 75–110)
GLUCOSE BLD-MCNC: 293 MG/DL (ref 70–99)
HCT VFR BLD CALC: 38.8 % (ref 40.7–50.3)
HEMOGLOBIN: 12 G/DL (ref 13–17)
MCH RBC QN AUTO: 29.3 PG (ref 25.2–33.5)
MCHC RBC AUTO-ENTMCNC: 30.9 G/DL (ref 28.4–34.8)
MCV RBC AUTO: 94.9 FL (ref 82.6–102.9)
NRBC AUTOMATED: 0 PER 100 WBC
PDW BLD-RTO: 13.1 % (ref 11.8–14.4)
PLATELET # BLD: 323 K/UL (ref 138–453)
PMV BLD AUTO: 9.8 FL (ref 8.1–13.5)
POTASSIUM SERPL-SCNC: 4.5 MMOL/L (ref 3.7–5.3)
RBC # BLD: 4.09 M/UL (ref 4.21–5.77)
SODIUM BLD-SCNC: 139 MMOL/L (ref 135–144)
WBC # BLD: 11.3 K/UL (ref 3.5–11.3)

## 2020-02-25 PROCEDURE — 85027 COMPLETE CBC AUTOMATED: CPT

## 2020-02-25 PROCEDURE — 87070 CULTURE OTHR SPECIMN AEROBIC: CPT

## 2020-02-25 PROCEDURE — 87205 SMEAR GRAM STAIN: CPT

## 2020-02-25 PROCEDURE — 96372 THER/PROPH/DIAG INJ SC/IM: CPT

## 2020-02-25 PROCEDURE — 6370000000 HC RX 637 (ALT 250 FOR IP): Performed by: NURSE PRACTITIONER

## 2020-02-25 PROCEDURE — 1200000000 HC SEMI PRIVATE

## 2020-02-25 PROCEDURE — 97116 GAIT TRAINING THERAPY: CPT

## 2020-02-25 PROCEDURE — 6360000002 HC RX W HCPCS: Performed by: NURSE PRACTITIONER

## 2020-02-25 PROCEDURE — 97535 SELF CARE MNGMENT TRAINING: CPT

## 2020-02-25 PROCEDURE — 71045 X-RAY EXAM CHEST 1 VIEW: CPT

## 2020-02-25 PROCEDURE — 82947 ASSAY GLUCOSE BLOOD QUANT: CPT

## 2020-02-25 PROCEDURE — 99232 SBSQ HOSP IP/OBS MODERATE 35: CPT | Performed by: INTERNAL MEDICINE

## 2020-02-25 PROCEDURE — 80048 BASIC METABOLIC PNL TOTAL CA: CPT

## 2020-02-25 PROCEDURE — 87641 MR-STAPH DNA AMP PROBE: CPT

## 2020-02-25 PROCEDURE — 36415 COLL VENOUS BLD VENIPUNCTURE: CPT

## 2020-02-25 PROCEDURE — 94640 AIRWAY INHALATION TREATMENT: CPT

## 2020-02-25 PROCEDURE — 96376 TX/PRO/DX INJ SAME DRUG ADON: CPT

## 2020-02-25 RX ORDER — INSULIN GLARGINE 100 [IU]/ML
25 INJECTION, SOLUTION SUBCUTANEOUS 2 TIMES DAILY
Status: DISCONTINUED | OUTPATIENT
Start: 2020-02-26 | End: 2020-02-26 | Stop reason: HOSPADM

## 2020-02-25 RX ORDER — MAGNESIUM HYDROXIDE/ALUMINUM HYDROXICE/SIMETHICONE 120; 1200; 1200 MG/30ML; MG/30ML; MG/30ML
30 SUSPENSION ORAL EVERY 6 HOURS PRN
Status: DISCONTINUED | OUTPATIENT
Start: 2020-02-25 | End: 2020-02-26 | Stop reason: HOSPADM

## 2020-02-25 RX ADMIN — MORPHINE SULFATE 4 MG: 4 INJECTION, SOLUTION INTRAMUSCULAR; INTRAVENOUS at 12:17

## 2020-02-25 RX ADMIN — MORPHINE SULFATE 4 MG: 4 INJECTION, SOLUTION INTRAMUSCULAR; INTRAVENOUS at 18:19

## 2020-02-25 RX ADMIN — MORPHINE SULFATE 2 MG: 2 INJECTION, SOLUTION INTRAMUSCULAR; INTRAVENOUS at 08:45

## 2020-02-25 RX ADMIN — FAMOTIDINE 20 MG: 20 TABLET, FILM COATED ORAL at 20:42

## 2020-02-25 RX ADMIN — INSULIN LISPRO 9 UNITS: 100 INJECTION, SOLUTION INTRAVENOUS; SUBCUTANEOUS at 08:38

## 2020-02-25 RX ADMIN — FAMOTIDINE 20 MG: 20 TABLET, FILM COATED ORAL at 08:38

## 2020-02-25 RX ADMIN — IPRATROPIUM BROMIDE AND ALBUTEROL SULFATE 1 AMPULE: .5; 3 SOLUTION RESPIRATORY (INHALATION) at 11:43

## 2020-02-25 RX ADMIN — ALUMINUM HYDROXIDE, MAGNESIUM HYDROXIDE, AND SIMETHICONE 30 ML: 200; 200; 20 SUSPENSION ORAL at 23:39

## 2020-02-25 RX ADMIN — INSULIN LISPRO 3 UNITS: 100 INJECTION, SOLUTION INTRAVENOUS; SUBCUTANEOUS at 12:16

## 2020-02-25 RX ADMIN — TRAZODONE HYDROCHLORIDE 300 MG: 100 TABLET ORAL at 22:35

## 2020-02-25 RX ADMIN — INSULIN GLARGINE 20 UNITS: 100 INJECTION, SOLUTION SUBCUTANEOUS at 08:37

## 2020-02-25 RX ADMIN — IPRATROPIUM BROMIDE AND ALBUTEROL SULFATE 1 AMPULE: .5; 3 SOLUTION RESPIRATORY (INHALATION) at 18:15

## 2020-02-25 RX ADMIN — INSULIN LISPRO 3 UNITS: 100 INJECTION, SOLUTION INTRAVENOUS; SUBCUTANEOUS at 22:36

## 2020-02-25 RX ADMIN — MORPHINE SULFATE 2 MG: 2 INJECTION, SOLUTION INTRAMUSCULAR; INTRAVENOUS at 22:36

## 2020-02-25 RX ADMIN — ENOXAPARIN SODIUM 40 MG: 40 INJECTION SUBCUTANEOUS at 08:39

## 2020-02-25 RX ADMIN — MORPHINE SULFATE 2 MG: 2 INJECTION, SOLUTION INTRAMUSCULAR; INTRAVENOUS at 01:32

## 2020-02-25 RX ADMIN — PREGABALIN 75 MG: 75 CAPSULE ORAL at 08:38

## 2020-02-25 RX ADMIN — PREGABALIN 75 MG: 75 CAPSULE ORAL at 20:42

## 2020-02-25 RX ADMIN — INSULIN LISPRO 9 UNITS: 100 INJECTION, SOLUTION INTRAVENOUS; SUBCUTANEOUS at 16:32

## 2020-02-25 RX ADMIN — ATORVASTATIN CALCIUM 40 MG: 40 TABLET, FILM COATED ORAL at 08:39

## 2020-02-25 RX ADMIN — ALUMINUM HYDROXIDE, MAGNESIUM HYDROXIDE, AND SIMETHICONE 30 ML: 200; 200; 20 SUSPENSION ORAL at 01:47

## 2020-02-25 ASSESSMENT — PAIN DESCRIPTION - DESCRIPTORS
DESCRIPTORS: ACHING;THROBBING
DESCRIPTORS: THROBBING
DESCRIPTORS: ACHING;THROBBING

## 2020-02-25 ASSESSMENT — PAIN DESCRIPTION - ONSET
ONSET: ON-GOING
ONSET: ON-GOING

## 2020-02-25 ASSESSMENT — PAIN DESCRIPTION - LOCATION
LOCATION: LEG
LOCATION: LEG
LOCATION: HIP;LEG

## 2020-02-25 ASSESSMENT — PAIN SCALES - GENERAL
PAINLEVEL_OUTOF10: 8
PAINLEVEL_OUTOF10: 6
PAINLEVEL_OUTOF10: 5
PAINLEVEL_OUTOF10: 6
PAINLEVEL_OUTOF10: 6
PAINLEVEL_OUTOF10: 3
PAINLEVEL_OUTOF10: 6
PAINLEVEL_OUTOF10: 7
PAINLEVEL_OUTOF10: 6
PAINLEVEL_OUTOF10: 7
PAINLEVEL_OUTOF10: 7

## 2020-02-25 ASSESSMENT — ENCOUNTER SYMPTOMS
SHORTNESS OF BREATH: 1
COUGH: 1
ABDOMINAL PAIN: 0
VOMITING: 0
NAUSEA: 0

## 2020-02-25 ASSESSMENT — PAIN DESCRIPTION - ORIENTATION
ORIENTATION: RIGHT

## 2020-02-25 ASSESSMENT — PAIN DESCRIPTION - PAIN TYPE
TYPE: CHRONIC PAIN
TYPE: ACUTE PAIN
TYPE: CHRONIC PAIN

## 2020-02-25 ASSESSMENT — PAIN DESCRIPTION - PROGRESSION
CLINICAL_PROGRESSION: NOT CHANGED
CLINICAL_PROGRESSION: NOT CHANGED

## 2020-02-25 ASSESSMENT — PAIN DESCRIPTION - FREQUENCY
FREQUENCY: CONTINUOUS
FREQUENCY: CONTINUOUS

## 2020-02-25 NOTE — CARE COORDINATION
Social work: Call received from [facility], they are out of network- pt responsible for $10K deductible. Hermelinda stated if patient goes to in-network facility, he is responsible for $5500/ deductible. LAVELL also spoke with [facility], patient has outstanding bill with them. Writer met with patient and informed him of this, he is opting to go home with home care-gave choice of [facility]. Patient states he gets around fine himself, noted no safety concerns.

## 2020-02-25 NOTE — PROGRESS NOTES
Writer spoke with prosthetist Maylin Carrillo. He stated pt will visit in office after d/c to get prosthetic leg refitted.
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
## 2020-02-25 NOTE — PROGRESS NOTES
Sent a message to NP about patient wanting to get up with prosthesis. Patient stated PT/OT were grabbing him earlier which made him seem unsteady. Patient wanted to \"prove\" that he could stand up and walk. NP stated that he is not to get up if PT/OT said he shouldn't.

## 2020-02-25 NOTE — PROGRESS NOTES
Spoke with Dr. Robb Choe regarding needing a consult for prosthetist. Dr. Kat Brothers stated he contacted prosthetist Taryn Russ himself and Taryn Russ should be coming in 2/25/2020 to see patient.
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
## 2020-02-25 NOTE — PLAN OF CARE
Problem: Pain:  Goal: Pain level will decrease  Description  Pain level will decrease  2/24/2020 2333 by Jared Ying RN  Outcome: Ongoing  2/24/2020 1103 by Arpit Islas RN  Outcome: Ongoing  Goal: Control of acute pain  Description  Control of acute pain  2/24/2020 2333 by Jared Ying RN  Outcome: Ongoing  2/24/2020 1103 by Arpit Islas RN  Outcome: Ongoing  Goal: Control of chronic pain  Description  Control of chronic pain  2/24/2020 2333 by Jared Ying RN  Outcome: Ongoing  2/24/2020 1103 by Arpit Islas RN  Outcome: Ongoing     Problem: Discharge Planning:  Goal: Discharged to appropriate level of care  Description  Discharged to appropriate level of care  2/24/2020 2333 by Jared Ying RN  Outcome: Ongoing  2/24/2020 1103 by Arpit Islas RN  Outcome: Ongoing     Problem:  Activity Intolerance:  Goal: Ability to tolerate increased activity will improve  Description  Ability to tolerate increased activity will improve  2/24/2020 2333 by Jared Ying RN  Outcome: Ongoing  2/24/2020 1103 by Arpit Islas RN  Outcome: Ongoing     Problem: Airway Clearance - Ineffective:  Goal: Ability to maintain a clear airway will improve  Description  Ability to maintain a clear airway will improve  2/24/2020 2333 by Jared Ying RN  Outcome: Ongoing  2/24/2020 1103 by Arpit Islas RN  Outcome: Ongoing     Problem: Breathing Pattern - Ineffective:  Goal: Ability to achieve and maintain a regular respiratory rate will improve  Description  Ability to achieve and maintain a regular respiratory rate will improve  2/24/2020 2333 by Jared Ying RN  Outcome: Ongoing  2/24/2020 1103 by Arpit Islas RN  Outcome: Ongoing     Problem: Gas Exchange - Impaired:  Goal: Levels of oxygenation will improve  Description  Levels of oxygenation will improve  2/24/2020 2333 by Jared Ying RN  Outcome: Ongoing  2/24/2020 1103 by Arpit Islas RN  Outcome: Ongoing Problem: Falls - Risk of:  Goal: Will remain free from falls  Description  Will remain free from falls  2/24/2020 2333 by Gianluca Martinez RN  Outcome: Ongoing  2/24/2020 1103 by Bandar Leyva RN  Outcome: Ongoing  Goal: Absence of physical injury  Description  Absence of physical injury  2/24/2020 2333 by Gianluca Martinez RN  Outcome: Ongoing  2/24/2020 1103 by Bandar Leyva RN  Outcome: Ongoing     Problem: Skin Integrity:  Goal: Will show no infection signs and symptoms  Description  Will show no infection signs and symptoms  2/24/2020 2333 by Gianluca Martinez RN  Outcome: Ongoing  2/24/2020 1103 by Bandar Leyva RN  Outcome: Ongoing  Goal: Absence of new skin breakdown  Description  Absence of new skin breakdown  2/24/2020 2333 by Gianluca Martinez RN  Outcome: Ongoing  2/24/2020 1103 by Bandar Leyva RN  Outcome: Ongoing     Problem: Risk for Impaired Skin Integrity  Goal: Tissue integrity - skin and mucous membranes  Description  Structural intactness and normal physiological function of skin and  mucous membranes.   2/24/2020 2333 by Gianluca Martinez RN  Outcome: Ongoing  2/24/2020 1103 by Bandar Leyva RN  Outcome: Ongoing

## 2020-02-25 NOTE — CARE COORDINATION
The Plan for Transition of Care is related to the following treatment goals: SN, PT/OT to increase strength and mobility with ambulation    The Patient was provided with a choice of provider and agrees   with the discharge plan. [x] Yes [] No    Freedom of choice list was provided with basic dialogue that supports the patient's individualized plan of care/goals, treatment preferences and shares the quality data associated with the providers. [x] Yes [] No    Pt requesting [facility] for SN, PT/OT. Has had them for services in the past.  Referral called to [facility] and will see pt today. LOREE initiated.

## 2020-02-25 NOTE — PLAN OF CARE
Problem: Pain:  Goal: Pain level will decrease  Description  Pain level will decrease  Outcome: Ongoing  Goal: Control of acute pain  Description  Control of acute pain  Outcome: Ongoing  Goal: Control of chronic pain  Description  Control of chronic pain  Outcome: Ongoing     Problem: Discharge Planning:  Goal: Discharged to appropriate level of care  Description  Discharged to appropriate level of care  Outcome: Ongoing     Problem: Activity Intolerance:  Goal: Ability to tolerate increased activity will improve  Description  Ability to tolerate increased activity will improve  Outcome: Ongoing     Problem: Airway Clearance - Ineffective:  Goal: Ability to maintain a clear airway will improve  Description  Ability to maintain a clear airway will improve  Outcome: Ongoing     Problem: Breathing Pattern - Ineffective:  Goal: Ability to achieve and maintain a regular respiratory rate will improve  Description  Ability to achieve and maintain a regular respiratory rate will improve  Outcome: Ongoing     Problem: Gas Exchange - Impaired:  Goal: Levels of oxygenation will improve  Description  Levels of oxygenation will improve  Outcome: Ongoing     Problem: Falls - Risk of:  Goal: Will remain free from falls  Description  Will remain free from falls  Outcome: Ongoing  Goal: Absence of physical injury  Description  Absence of physical injury  Outcome: Ongoing     Problem: Skin Integrity:  Goal: Will show no infection signs and symptoms  Description  Will show no infection signs and symptoms  Outcome: Ongoing  Goal: Absence of new skin breakdown  Description  Absence of new skin breakdown  Outcome: Ongoing     Problem: Risk for Impaired Skin Integrity  Goal: Tissue integrity - skin and mucous membranes  Description  Structural intactness and normal physiological function of skin and  mucous membranes.   Outcome: Ongoing

## 2020-02-25 NOTE — DISCHARGE INSTR - COC
Continuity of Care Form    Patient Name: Jhoan Crews   :  1955  MRN:  [id number]    Admit date:  2020  Discharge date:  2020    Code Status Order: Full Code   Advance Directives:   Advance Care Flowsheet Documentation     Date/Time Healthcare Directive Type of Healthcare Directive Copy in [address] Agent's Name Healthcare Agent's Phone Number    [phone]  No, patient does not have an advance directive for healthcare treatment -- -- -- -- --          Admitting Physician:  Bhupendra Brown DO  PCP: Perla Casiano DO    Discharging Nurse: [facility] Unit/Room#:   Discharging Unit Phone Number: [phone]    Emergency Contact:   Extended Emergency Contact Information  Primary Emergency Contact: Bharat Lovelly  Address: [place] [facility] Phone: [phone]  Relation: Parent  Secondary Emergency Contact: VIGNESH Parsons [address] [place] [address] Phone: [phone]  Relation: Child    Past Surgical History:  Past Surgical History:   Procedure Laterality Date    COLONOSCOPY  2015    hyperplastic polyp    COLONOSCOPY  2017    ESOPHAGECTOMY      cancer    FOOT SURGERY Right 2016    I & D heel    FOOT SURGERY Right 2016    I & D    FRACTURE SURGERY Left 2015    humerus left, left leg    LEG AMPUTATION BELOW KNEE Right 2017    TOE AMPUTATION Right     rt 3rd through 5th digits    TOE AMPUTATION Left 2016    left foot 5th toe    UPPER GASTROINTESTINAL ENDOSCOPY      13- with dilation    VASCULAR SURGERY Right 2017    foot guillotine amputation       Immunization History:   Immunization History   Administered Date(s) Administered    Influenza Virus Vaccine 2014, 10/29/2015    Influenza, Quadv, IM, (6 mo and older Fluzone, Flulaval, Fluarix and 3 yrs and older Afluria) 10/10/2016    Pneumococcal Polysaccharide ([id number]) 10/29/2015    Tdap (Boostrix, Adacel) 07/01/2019       Active Problems:  Patient Active Problem List   Diagnosis Code    Type 2 diabetes mellitus with diabetic polyneuropathy, with long-term current use of insulin ([facility]) E11.42, Z79.4    Neuropathic pain, leg M79.2    Diabetic polyneuropathy ([facility]) E11.42    GERD (gastroesophageal reflux disease) K21.9    Allergic rhinitis J30.9    Tobacco abuse Z72.0    COPD (chronic obstructive pulmonary disease) ([facility]) J44.9    Edentulous K00.0    Dysphagia R13.10    Lung nodules R91.8    Esophageal cancer ([facility]) C15.9    Fracture of humerus, left, closed S42.302A    Closed fracture of humerus S42.309A    DM type 2 with diabetic peripheral neuropathy ([facility]) E11.42    Chronic obstructive pulmonary disease ([facility]) J44.9    Hyperlipidemia E78.5    Gastroesophageal reflux disease K21.9    Chronic pain syndrome G89.4    Marijuana use F12.90    History of colon polyps Z86.010    Cellulitis of right heel L03.115    PVD (peripheral vascular disease) ([facility]) I73.9    Functional diarrhea K59.1    Sepsis due to methicillin resistant Staphylococcus aureus (MRSA) ([facility]) A41.02    History of esophageal cancer Z85.01    Osteomyelitis, chronic, ankle or foot ([address] M86.679    PAD (peripheral artery disease) ([facility]) I73.9    Other pulmonary embolism without acute cor pulmonale ([facility]) I26.99    Carotid stenosis, asymptomatic, bilateral I65.23    Lower limb amputation status Z89.619    Fx humeral neck, right, closed, initial encounter S42.211A    Transient hypotension I95.9    Constipation due to opioid therapy K59.03, T40.2X5A    Acute kidney injury ([address] N17.9    Diabetic ulcer of left midfoot associated with type 2 diabetes mellitus, with fat layer exposed ([address] E11.621, S74.227    Complication of below knee amputation stump ([facility]) T87.9    COPD exacerbation ([facility]) J44.1    Hyponatremia E87.1    Pain, phantom limb ([address] G54.6    Below-knee amputation of right lower extremity ([address] Q27.535P  Hyperglycemia R73.9    Moderate protein malnutrition (HCC) E44.0    Essential hypertension I10    Uncontrolled type 2 diabetes mellitus with hyperglycemia (HCC) E11.65       Isolation/Infection:   Isolation          Contact        Patient Infection Status     Infection Onset Added Last Indicated Last Indicated By Review Planned Expiration Resolved Resolved By    MRSA  01/02/17 01/02/17 Michael Clark RN        Foot - 6/2018          Nurse Assessment:  Last Vital Signs: /62   Pulse 68   Temp 97.8 °F (36.6 °C) (Oral)   Resp 16   Ht 6' 1\" (1.854 m)   Wt 163 lb (73.9 kg)   SpO2 98%   BMI 21.51 kg/m²     Last documented pain score (0-10 scale): Pain Level: 7  Last Weight:   Wt Readings from Last 1 Encounters:   02/25/20 163 lb (73.9 kg)     Mental Status:  oriented and alert    IV Access:  - None    Nursing Mobility/ADLs:  Walking   Assisted  Transfer  Assisted  Bathing  Assisted  Dressing  Assisted  Toileting  Assisted  Feeding  Independent  Med Admin  Independent  Med Delivery   whole    Wound Care Documentation and Therapy:  Wound 02/24/20 Knee Anterior; Left scabs x2 X1\" round (Active)   Wound Other 2/25/2020 12:15 AM   Dressing Status Other (Comment) 2/24/2020  1:59 AM   Dressing/Treatment Open to air 2/24/2020  1:59 AM   Wound Cleansed Soap and water 2/24/2020  1:59 AM   Wound Assessment Other (Comment) 2/24/2020  1:59 AM   Drainage Amount None 2/24/2020  8:04 PM   Odor None 2/24/2020  8:04 PM   Number of days: 1        Elimination:  Continence:   · Bowel: Yes  · Bladder: Yes  Urinary Catheter: None   Colostomy/Ileostomy/Ileal Conduit: No       Date of Last BM: ***    Intake/Output Summary (Last 24 hours) at 2/25/2020 1440  Last data filed at 2/25/2020 1226  Gross per 24 hour   Intake 1754.71 ml   Output 1625 ml   Net 129.71 ml     I/O last 3 completed shifts: In: 1274.7 [P.O.:120; I.V.:1154.7]  Out: 1075 [Urine:1075]    Safety Concerns:      At Risk for Falls    Impairments/Disabilities: cessation  Vascular reevaluation and f/u Dr Deborah Rodgers al  Anticoagulation per vascular  Pain management  Tramadol provided  Lyrica continued  Prosthetist evaluation and follow-up as needed    Physician Certification: I certify the above information and transfer of Kimberly Jorgensen  is necessary for the continuing treatment of the diagnosis listed and that he requires Home Care for less 30 days. Update Admission H&P:   Principal Problem:    COPD exacerbation (HCC)  Active Problems:    Type 2 diabetes mellitus with diabetic polyneuropathy, with long-term current use of insulin (HCC)    Diabetic polyneuropathy (HCC)    GERD (gastroesophageal reflux disease)    Tobacco abuse    DM type 2 with diabetic peripheral neuropathy (HCC)    Hyperlipidemia    Chronic pain syndrome    Marijuana use    PVD (peripheral vascular disease) (HCC)    History of esophageal cancer    PAD (peripheral artery disease) ([facility])    Carotid stenosis, asymptomatic, bilateral    Hyponatremia    Pain, phantom limb (HCC)    Below-knee amputation of right lower extremity (HCC)    Hyperglycemia    Moderate protein malnutrition ([address]    Essential hypertension    Uncontrolled type 2 diabetes mellitus with hyperglycemia ([address]    History of pulmonary embolism - 2017    Atelectasis  Resolved Problems:    * No resolved hospital problems.  *       PHYSICIAN SIGNATURE:  Electronically signed by Yamile Salcido DO on 2/26/20 at 11:02 AM
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
## 2020-02-26 VITALS
TEMPERATURE: 98.2 F | RESPIRATION RATE: 18 BRPM | OXYGEN SATURATION: 92 % | HEIGHT: 73 IN | DIASTOLIC BLOOD PRESSURE: 74 MMHG | BODY MASS INDEX: 21.87 KG/M2 | HEART RATE: 98 BPM | WEIGHT: 165 LBS | SYSTOLIC BLOOD PRESSURE: 149 MMHG

## 2020-02-26 PROBLEM — J98.11 ATELECTASIS: Status: ACTIVE | Noted: 2020-02-26

## 2020-02-26 PROBLEM — Z86.711 HISTORY OF PULMONARY EMBOLISM: Status: ACTIVE | Noted: 2020-02-26

## 2020-02-26 LAB
CULTURE: ABNORMAL
DIRECT EXAM: ABNORMAL
GLUCOSE BLD-MCNC: 189 MG/DL (ref 75–110)
GLUCOSE BLD-MCNC: 203 MG/DL (ref 75–110)
Lab: ABNORMAL
MRSA, DNA, NASAL: NORMAL
SPECIMEN DESCRIPTION: ABNORMAL
SPECIMEN DESCRIPTION: NORMAL

## 2020-02-26 PROCEDURE — 6360000002 HC RX W HCPCS: Performed by: NURSE PRACTITIONER

## 2020-02-26 PROCEDURE — 96375 TX/PRO/DX INJ NEW DRUG ADDON: CPT

## 2020-02-26 PROCEDURE — 6370000000 HC RX 637 (ALT 250 FOR IP): Performed by: NURSE PRACTITIONER

## 2020-02-26 PROCEDURE — 96372 THER/PROPH/DIAG INJ SC/IM: CPT

## 2020-02-26 PROCEDURE — 94640 AIRWAY INHALATION TREATMENT: CPT

## 2020-02-26 PROCEDURE — 99239 HOSP IP/OBS DSCHRG MGMT >30: CPT | Performed by: INTERNAL MEDICINE

## 2020-02-26 PROCEDURE — 87641 MR-STAPH DNA AMP PROBE: CPT

## 2020-02-26 PROCEDURE — 6370000000 HC RX 637 (ALT 250 FOR IP): Performed by: INTERNAL MEDICINE

## 2020-02-26 PROCEDURE — 96376 TX/PRO/DX INJ SAME DRUG ADON: CPT

## 2020-02-26 RX ORDER — PANTOPRAZOLE SODIUM 40 MG/1
TABLET, DELAYED RELEASE ORAL
Qty: 90 TABLET | Refills: 5 | Status: ON HOLD | OUTPATIENT
Start: 2020-02-26 | End: 2020-07-27

## 2020-02-26 RX ORDER — TRAMADOL HYDROCHLORIDE 50 MG/1
50 TABLET ORAL EVERY 6 HOURS PRN
Status: DISCONTINUED | OUTPATIENT
Start: 2020-02-26 | End: 2020-02-26 | Stop reason: HOSPADM

## 2020-02-26 RX ORDER — IPRATROPIUM BROMIDE AND ALBUTEROL SULFATE 2.5; .5 MG/3ML; MG/3ML
3 SOLUTION RESPIRATORY (INHALATION)
Qty: 120 ML | Refills: 0 | Status: SHIPPED | OUTPATIENT
Start: 2020-02-26 | End: 2020-05-28 | Stop reason: SDUPTHER

## 2020-02-26 RX ORDER — AZITHROMYCIN 250 MG/1
500 TABLET, FILM COATED ORAL DAILY
Status: DISCONTINUED | OUTPATIENT
Start: 2020-02-26 | End: 2020-02-26 | Stop reason: HOSPADM

## 2020-02-26 RX ORDER — ATORVASTATIN CALCIUM 40 MG/1
40 TABLET, FILM COATED ORAL DAILY
Qty: 90 TABLET | Refills: 3 | Status: SHIPPED | OUTPATIENT
Start: 2020-02-26 | End: 2020-09-05 | Stop reason: SDUPTHER

## 2020-02-26 RX ORDER — PREDNISONE 20 MG/1
40 TABLET ORAL DAILY
Status: DISCONTINUED | OUTPATIENT
Start: 2020-02-26 | End: 2020-02-26 | Stop reason: HOSPADM

## 2020-02-26 RX ORDER — CYCLOBENZAPRINE HCL 10 MG
10 TABLET ORAL 3 TIMES DAILY PRN
Qty: 20 TABLET | Refills: 0 | Status: SHIPPED | OUTPATIENT
Start: 2020-02-26 | End: 2020-03-09 | Stop reason: SDUPTHER

## 2020-02-26 RX ORDER — AZITHROMYCIN 500 MG/1
500 TABLET, FILM COATED ORAL DAILY
Qty: 3 TABLET | Refills: 0 | Status: SHIPPED | OUTPATIENT
Start: 2020-02-26 | End: 2020-02-29

## 2020-02-26 RX ORDER — PREGABALIN 75 MG/1
75 CAPSULE ORAL 2 TIMES DAILY
Qty: 60 CAPSULE | Refills: 0 | Status: SHIPPED | OUTPATIENT
Start: 2020-02-26 | End: 2020-03-09 | Stop reason: ALTCHOICE

## 2020-02-26 RX ORDER — NICOTINE 21 MG/24HR
1 PATCH, TRANSDERMAL 24 HOURS TRANSDERMAL DAILY PRN
Qty: 30 PATCH | Refills: 3 | Status: ON HOLD | OUTPATIENT
Start: 2020-02-26 | End: 2020-07-28

## 2020-02-26 RX ORDER — PREDNISONE 20 MG/1
40 TABLET ORAL DAILY
Qty: 6 TABLET | Refills: 0 | Status: SHIPPED | OUTPATIENT
Start: 2020-02-26 | End: 2020-02-29

## 2020-02-26 RX ORDER — INSULIN GLARGINE 100 [IU]/ML
25 INJECTION, SOLUTION SUBCUTANEOUS 2 TIMES DAILY
Qty: 1 VIAL | Refills: 3 | Status: ON HOLD | OUTPATIENT
Start: 2020-02-26 | End: 2020-07-28

## 2020-02-26 RX ORDER — TRAMADOL HYDROCHLORIDE 50 MG/1
50 TABLET ORAL EVERY 6 HOURS PRN
Qty: 10 TABLET | Refills: 0 | Status: SHIPPED | OUTPATIENT
Start: 2020-02-26 | End: 2020-03-04

## 2020-02-26 RX ADMIN — IPRATROPIUM BROMIDE AND ALBUTEROL SULFATE 1 AMPULE: .5; 3 SOLUTION RESPIRATORY (INHALATION) at 08:13

## 2020-02-26 RX ADMIN — ONDANSETRON 4 MG: 2 INJECTION INTRAMUSCULAR; INTRAVENOUS at 07:53

## 2020-02-26 RX ADMIN — ENOXAPARIN SODIUM 40 MG: 40 INJECTION SUBCUTANEOUS at 08:57

## 2020-02-26 RX ADMIN — INSULIN GLARGINE 25 UNITS: 100 INJECTION, SOLUTION SUBCUTANEOUS at 08:56

## 2020-02-26 RX ADMIN — PREDNISONE 40 MG: 20 TABLET ORAL at 11:37

## 2020-02-26 RX ADMIN — PREGABALIN 75 MG: 75 CAPSULE ORAL at 08:57

## 2020-02-26 RX ADMIN — INSULIN LISPRO 6 UNITS: 100 INJECTION, SOLUTION INTRAVENOUS; SUBCUTANEOUS at 08:55

## 2020-02-26 RX ADMIN — IPRATROPIUM BROMIDE AND ALBUTEROL SULFATE 1 AMPULE: .5; 3 SOLUTION RESPIRATORY (INHALATION) at 11:39

## 2020-02-26 RX ADMIN — AZITHROMYCIN 500 MG: 250 TABLET, FILM COATED ORAL at 11:37

## 2020-02-26 RX ADMIN — FAMOTIDINE 20 MG: 20 TABLET, FILM COATED ORAL at 08:57

## 2020-02-26 RX ADMIN — INSULIN LISPRO 6 UNITS: 100 INJECTION, SOLUTION INTRAVENOUS; SUBCUTANEOUS at 11:47

## 2020-02-26 RX ADMIN — ATORVASTATIN CALCIUM 40 MG: 40 TABLET, FILM COATED ORAL at 08:57

## 2020-02-26 RX ADMIN — MORPHINE SULFATE 4 MG: 4 INJECTION, SOLUTION INTRAMUSCULAR; INTRAVENOUS at 09:00

## 2020-02-26 ASSESSMENT — ENCOUNTER SYMPTOMS
COUGH: 1
NAUSEA: 0
SHORTNESS OF BREATH: 1
ABDOMINAL PAIN: 0
VOMITING: 0

## 2020-02-26 ASSESSMENT — PAIN SCALES - GENERAL: PAINLEVEL_OUTOF10: 7

## 2020-02-26 ASSESSMENT — PAIN DESCRIPTION - PAIN TYPE: TYPE: CHRONIC PAIN

## 2020-02-26 ASSESSMENT — PAIN DESCRIPTION - LOCATION: LOCATION: LEG

## 2020-02-26 NOTE — CARE COORDINATION
Jazz Magdaleno from [facility] saw pt and is eligible for script assist. Patients nurse aJyne Mon informed.

## 2020-02-26 NOTE — PROGRESS NOTES
CLINICAL PHARMACY NOTE: MEDS TO [address] Patient?: No  Total # of Prescriptions Filled: 12   The following medications were delivered to the patient:  · LEADSER PEN NEEDLES 31G X 8MM  · HUMALOG KWIKPEN  · NICOTINE STEP 1 PATCHES  · IPRATROPIUM-ALBUTEROL 05-2.5 NSOLN  · LANTUS SOLOSTAR PEN  · PREDNISONE 20MG  · PANTOPRAZOLE 40MG  · PREGABLIN 75MG  · CYCLOBENZAPRINE 10MG  · TRAMADOL 50 MG  · AZITHROMYCIN 500MG  · ATORVASTATIN 40MG  Total # of Interventions Completed: 3  Time Spent (min): 120    Additional Documentation:  HE HAD NO MONEY, ZT'Q WENT THROUGH INS THOUGH.   THEY GAVE HIM THE HELP RX PROGRAM.  45.50 WAS BILLED TO [facility]

## 2020-02-26 NOTE — PROGRESS NOTES
Pt discharged to home per wc. Scripts delivered to the room and charged to the unit. Nebulizer to be dropped at patients home. Awaiting script.  Teaching done and sta medication management for smoking cessation and DM notified and will follow OP

## 2020-02-26 NOTE — CARE COORDINATION
Cristin from [facility] informed of discharge. Nebulizer ordered from [facility] [place] and will have it delivered to patients home. Pt informed.

## 2020-02-26 NOTE — PLAN OF CARE
[address] [place] [address]       Second Visit Note  For more detailed information please refer to the progress note of the day      2/26/2020    2:50 PM    Name:   Lavelle Albright  MRN:     [id number]     Acct:      [de-identified]   Room:   2018/2018-02   Day:  2  Admit Date:  2/23/2020 10:02 PM    PCP:   Jennifer Pitts DO  Code Status:  Full Code    Patient has been seen  Discharge planning in progress    CURRENT MEDS:   predniSONE (DELTASONE) tablet 40 mg, Daily  traMADol (ULTRAM) tablet 50 mg, Q6H PRN  azithromycin (ZITHROMAX) tablet 500 mg, Daily  aluminum & magnesium hydroxide-simethicone (MAALOX) 200-200-20 MG/5ML suspension 30 mL, Q6H PRN  insulin glargine (LANTUS) injection vial 25 Units, BID  apixaban (ELIQUIS) tablet 5 mg, Daily  atorvastatin (LIPITOR) tablet 40 mg, Daily  pregabalin (LYRICA) capsule 75 mg, BID  traZODone (DESYREL) tablet 300 mg, Nightly  sodium chloride flush 0.9 % injection 10 mL, 2 times per day  sodium chloride flush 0.9 % injection 10 mL, PRN  acetaminophen (TYLENOL) tablet 650 mg, Q6H PRN  acetaminophen (TYLENOL) suppository 650 mg, Q6H PRN  polyethylene glycol (GLYCOLAX) packet 17 g, Daily PRN  promethazine (PHENERGAN) tablet 12.5 mg, Q6H PRN  ondansetron (ZOFRAN) injection 4 mg, Q6H PRN  famotidine (PEPCID) tablet 20 mg, BID  nicotine (NICODERM CQ) 21 MG/24HR 1 patch, Daily PRN  albuterol (PROVENTIL) nebulizer solution 2.5 mg, Q2H PRN  ipratropium-albuterol (DUONEB) nebulizer solution 1 ampule, Q4H WA  glucose (GLUTOSE) 40 % oral gel 15 g, PRN  dextrose 50 % IV solution, PRN  glucagon (rDNA) injection 1 mg, PRN  dextrose 5 % solution, PRN  insulin lispro (HUMALOG) injection vial 0-18 Units, TID WC  insulin lispro (HUMALOG) injection vial 0-9 Units, Nightly  0.9 % sodium chloride infusion, Continuous  cyclobenzaprine (FLEXERIL) tablet 10 mg, TID PRN        VITALS:  BP (!) 149/74   Pulse 98   Temp 98.2 °F (36.8 °C) (Oral)   Resp 18   Ht 6' 1\" (1.854 m)   Wt

## 2020-02-26 NOTE — CARE COORDINATION
Spoke to Jeffery Reyes from [facility] [place] and face sheet, script and face to face note faxed for nebulizer. [facility] will contact patient and make arrangements for home delivery.
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
## 2020-02-27 ENCOUNTER — TELEPHONE (OUTPATIENT)
Dept: FAMILY MEDICINE CLINIC | Age: 65
End: 2020-02-27

## 2020-02-27 NOTE — DISCHARGE SUMMARY
control blood sugars   Lantus titrated to 25 units twice daily  Blood Pressure - Monitor and control   Diabetic education  Dietary education  Encourage compliance  Evaluate ongoing Eliquis therapy: Patient cannot afford  -Appears to have had a PE in 2017  -Denies history of atrial fibrillation  Tobacco cessation  Vascular reevaluation and f/u Dr Misha dobbins  Anticoagulation per vascular  Pain management  Tramadol provided  Lyrica continued  Prosthetist evaluation and follow-up as needed  Surgical follow-up as scheduled: Dr Hamm [place]   Reflux precautions   Will discharge when arrangements complete and ok with other services. Follow-up with PCP in one week, Dutch Lizarraga DO  Notify PCP of discharge   Med rec done  LOREE done  Patient declined skilled nursing facility  Home care to be arranged  DCP 36 minutes plus    Discharge plan:     Respiratory Therapy and Bronchodilators prn  Antibiotics: Zithromax   Steroids: Prednisone  Glycemic contol - monitor and control blood sugars   Lantus titrated to 25 units twice daily  Blood Pressure - Monitor and control   Diabetic education  Dietary education  Encourage compliance  Evaluate ongoing Eliquis therapy: Patient cannot afford  -Appears to have had a PE in 2017  -Denies history of atrial fibrillation  Tobacco cessation  Vascular reevaluation and f/u Dr Misha dobbins  Anticoagulation per vascular  Pain management  Tramadol provided  Lyrica continued  Prosthetist evaluation and follow-up as needed  Surgical follow-up as scheduled: Dr Noris Celaya   Reflux precautions   Will discharge when arrangements complete and ok with other services.   Follow-up with PCP in one week, Dutch Lizarraga DO  Notify PCP of discharge   Med rec done  LOREE done  Patient declined skilled nursing facility  Home care to be arranged  DCP 36 minutes plus    The patient was provided with a nebulizer for home use       Discharge Procedure Orders   [facility]) Medication Mgmt (Diabetes - Clinical Pharmacy) - Shanique Kimble Referral Priority: Routine Referral Type: Consult for Advice and Opinion   Referral Reason: Specialty Services Required   Requested Specialty: Pharmacist   Number of Visits Requested: 1 Expiration Date: 02/26/22     [facility]) Medication Mgmt (Smoking Cessation - [address]   Referral Priority: Routine Referral Type: Consult for Advice and Opinion   Referral Reason: Specialty Services Required   Requested Specialty: Pharmacist   Number of Visits Requested: 1 Expiration Date: 02/26/22     DME Order for Nebulizer as OP   Order Comments: You must complete the order parameters below and add the medical necessity documentation for this DME in a separate note. Nebulizer with compressor  Disposable Med Nebs 2 per month  Reusable Med Nebs 1 per 6 months  Aerosol Mask 1 per month  Replacement Filters 2 per month  All other related supplies as needed per month    Medications being used:  Duoneb     Frequency:  Four times daily prn    Diagnosis: COPD    Length of Need: 12 months       Significant Diagnostic Studies:     Labs / Micro:  Labs:  Hematology:  Recent Labs     02/23/20 2215 02/25/20  0528   WBC 11.0 11.3   RBC 4.54 4.09*   HGB 13.5 12.0*   HCT 42.0 38.8*   MCV 92.5 94.9   MCH 29.7 29.3   MCHC 32.1 30.9   RDW 12.7 13.1    323   MPV 9.9 9.8     Chemistry:  Recent Labs     02/23/20 2215 02/25/20  0528   * 139   K 4.6 4.5   CL 92* 104   CO2 22 24   GLUCOSE 518* 293*   BUN 25* 18   CREATININE 1.04 0.81   ANIONGAP 14 11   LABGLOM >60 >60   GFRAA >60 >60   CALCIUM 9.2 8.7   PROBNP 165  --      Recent Labs     02/24/20  0435  02/24/20 2025 02/25/20  0623 02/25/20  1135 02/25/20  1626 02/25/20 2044 02/26/20  1135   LABA1C 15.4*  --   --   --   --   --   --   --    POCGLU  --    < > 271* 275* 189* 286* 241* 203*    < > = values in this interval not displayed.           Radiology:    Xr Chest Portable    Result Date: 2/25/2020  EXAMINATION: ONE XRAY VIEW OF THE CHEST 2/25/2020 8:46 am COMPARISON: 02/23/2020 HISTORY: ORDERING SYSTEM PROVIDED HISTORY: AECOPD TECHNOLOGIST PROVIDED HISTORY: AECOPD Reason for Exam: port ap upright copd Acuity: Unknown Type of Exam: Unknown FINDINGS: Enlarged heart. Blunting of costophrenic sulci suggests trace effusion or pleuroparenchymal disease. Streaky left basilar opacity favors atelectasis, increased from prior. Old left clavicle fracture. Partially imaged left humeral hardware. Increased linear left basilar opacity favoring atelectasis. Pleuroparenchymal scarring or effusions in the bases. Xr Chest Portable    Result Date: 2/23/2020  EXAMINATION: ONE XRAY VIEW OF THE CHEST 2/23/2020 10:37 pm COMPARISON: July 9, 2018 HISTORY: ORDERING SYSTEM PROVIDED HISTORY: orthopnea TECHNOLOGIST PROVIDED HISTORY: orthopnea Reason for Exam: orthopnea Acuity: Acute Type of Exam: Initial FINDINGS: Plate and screws transfix the femoral neck. Left distal clavicular fracture, age indeterminate. Moderate hiatal hernia. The lungs are without acute focal process. There is no effusion or pneumothorax. The cardiomediastinal silhouette is without acute process. The osseous structures are without acute process. Distal left clavicular fracture, age indeterminate. Otherwise no acute disease.          Consultations:    Consults:     Final Specialist Recommendations/Findings:   IP CONSULT TO INTERNAL MEDICINE  IP CONSULT TO SOCIAL WORK  IP CONSULT TO VASCULAR SURGERY  IP CONSULT TO DIABETES EDUCATOR  IP CONSULT TO DIETITIAN        Discharged Condition:    Stable     Disposition: Home Care    Physician Follow Up:   DO Rasheeda Frankel [address]  [facility]  [phone]    On 3/2/2020  3:00    [place] [address] [employer] [facility]  [address]  [phone]    prosthetic    Alirio London MD  [address] [facility] [address] [address] [facility] [address] [facility] [address] [address]  [phone]      Nebulizer       Activity:  activity as tolerated    Diet:  Cardiac / ADA     Discharge Medications:      Medication List      START taking these medications    azithromycin 500 MG tablet  Commonly known as:  ZITHROMAX  Take 1 tablet by mouth daily for 3 days     cyclobenzaprine 10 MG tablet  Commonly known as:  FLEXERIL  Take 1 tablet by mouth 3 times daily as needed for Muscle spasms     * insulin lispro 100 UNIT/ML injection vial  Commonly known as:  HUMALOG  Inject 0-18 Units into the skin 3 times daily (with meals)     * insulin lispro 100 UNIT/ML injection vial  Commonly known as:  HUMALOG  Inject 0-9 Units into the skin nightly     ipratropium-albuterol 0.5-2.5 (3) MG/3ML Soln nebulizer solution  Commonly known as:  DUONEB  Inhale 3 mLs into the lungs every 4 hours (while awake)     nicotine 21 MG/24HR  Commonly known as:  NICODERM CQ  Place 1 patch onto the skin daily as needed (if patient smokes/requests nicotine replacement therapy)     predniSONE 20 MG tablet  Commonly known as:  DELTASONE  Take 2 tablets by mouth daily for 3 days     traMADol 50 MG tablet  Commonly known as:  ULTRAM  Take 1 tablet by mouth every 6 hours as needed for Pain for up to 7 days. * This list has 2 medication(s) that are the same as other medications prescribed for you. Read the directions carefully, and ask your doctor or other care provider to review them with you.             CHANGE how you take these medications    insulin glargine 100 UNIT/ML injection vial  Commonly known as:  Lantus  Inject 25 Units into the skin 2 times daily  What changed:  how much to take     metFORMIN 500 MG tablet  Commonly known as:  GLUCOPHAGE  Take 1 tablet by mouth 2 times daily (with meals)  What changed:  additional instructions        CONTINUE taking these medications    albuterol sulfate  (90 Base) MCG/ACT inhaler  Commonly known as:  Ventolin HFA  Inhale 2 puffs into the lungs every 6 hours as needed for Wheezing     atorvastatin 40 MG tablet  Commonly known as:  LIPITOR  Take 1 tablet by mouth daily     blood glucose test strips strip  Commonly known as:  ASCENSIA AUTODISC VI;ONE TOUCH ULTRA TEST VI  TID TO CHECK ELEVATED BLOOD SUGARS Use with associated glucose meter. glucose monitoring kit monitoring kit  1 kit by Does not apply route daily To check hyperglycemia /fluctating sugars     Insulin Pen Needle 32G X 4 MM Misc  1 each by Does not apply route daily     pantoprazole 40 MG tablet  Commonly known as:  PROTONIX  TAKE 1 TABLET BY MOUTH EVERY MORNING BEFORE BREAKFAST     pregabalin 75 MG capsule  Commonly known as:  LYRICA  Take 1 capsule by mouth 2 times daily for 30 days. TRUEplus Lancets 30G Misc  Inject 1 each into the skin 3 times daily TO CHECK FLUCTUATING BLOOD SUGARS IE HYPERGLYCEMIA     Tylenol PM Extra Strength  MG tablet  Generic drug:  diphenhydrAMINE-APAP (sleep)        STOP taking these medications    Eliquis 5 MG Tabs tablet  Generic drug:  apixaban           Where to Get Your Medications      These medications were sent to [address] [address] [place] [address] [phone] - F [phone]  [address] [place] [address], [address] [place] [address]    Phone:  [phone]   · atorvastatin 40 MG tablet  · azithromycin 500 MG tablet  · cyclobenzaprine 10 MG tablet  · insulin glargine 100 UNIT/ML injection vial  · insulin lispro 100 UNIT/ML injection vial  · insulin lispro 100 UNIT/ML injection vial  · ipratropium-albuterol 0.5-2.5 (3) MG/3ML Soln nebulizer solution  · nicotine 21 MG/24HR  · pantoprazole 40 MG tablet  · predniSONE 20 MG tablet  · pregabalin 75 MG capsule  · traMADol 50 MG tablet         Time Spent on discharge is  36 mins in patient examination, evaluation, counseling, medication reconciliation, discharge plan and follow up.     Electronically signed by   Dixie Velasquez DO  2/26/2020  9:06 PM      Thank you Dr. Onofre Heart DO for

## 2020-02-27 NOTE — TELEPHONE ENCOUNTER
[facility] Transitions Initial Follow Up Call    Outreach made within 2 business days of discharge: Yes    Patient: Tye Gates Patient : 1955   MRN: [id number]  Reason for Admission: There are no discharge diagnoses documented for the most recent discharge. Discharge Date: 20       Spoke with: FRANCISCO    Discharge department/facility: [facility] Interactive Patient Contact:  Was patient able to fill all prescriptions: Yes  Was patient instructed to bring all medications to the follow-up visit: Yes  Is patient taking all medications as directed in the discharge summary?  Yes  Does patient understand their discharge instructions: Yes  Does patient have questions or concerns that need addressed prior to 7-14 day follow up office visit: no    Scheduled appointment with PCP within 7-14 days    Follow Up  Future Appointments   Date Time Provider Jordon Frost   3/2/2020  3:00 PM Elias Flores DO [address] [facility]

## 2020-03-09 ENCOUNTER — TELEPHONE (OUTPATIENT)
Dept: PHARMACY | Age: 65
End: 2020-03-09

## 2020-03-09 ENCOUNTER — OFFICE VISIT (OUTPATIENT)
Dept: FAMILY MEDICINE CLINIC | Age: 65
End: 2020-03-09
Payer: MEDICARE

## 2020-03-09 VITALS
RESPIRATION RATE: 16 BRPM | SYSTOLIC BLOOD PRESSURE: 128 MMHG | WEIGHT: 163 LBS | HEIGHT: 73 IN | OXYGEN SATURATION: 92 % | TEMPERATURE: 97.2 F | BODY MASS INDEX: 21.6 KG/M2 | DIASTOLIC BLOOD PRESSURE: 76 MMHG | HEART RATE: 102 BPM

## 2020-03-09 PROCEDURE — 4004F PT TOBACCO SCREEN RCVD TLK: CPT | Performed by: INTERNAL MEDICINE

## 2020-03-09 PROCEDURE — G8427 DOCREV CUR MEDS BY ELIG CLIN: HCPCS | Performed by: INTERNAL MEDICINE

## 2020-03-09 PROCEDURE — 1111F DSCHRG MED/CURRENT MED MERGE: CPT | Performed by: INTERNAL MEDICINE

## 2020-03-09 PROCEDURE — G8420 CALC BMI NORM PARAMETERS: HCPCS | Performed by: INTERNAL MEDICINE

## 2020-03-09 PROCEDURE — G8484 FLU IMMUNIZE NO ADMIN: HCPCS | Performed by: INTERNAL MEDICINE

## 2020-03-09 PROCEDURE — 3017F COLORECTAL CA SCREEN DOC REV: CPT | Performed by: INTERNAL MEDICINE

## 2020-03-09 PROCEDURE — 99214 OFFICE O/P EST MOD 30 MIN: CPT | Performed by: INTERNAL MEDICINE

## 2020-03-09 PROCEDURE — 2022F DILAT RTA XM EVC RTNOPTHY: CPT | Performed by: INTERNAL MEDICINE

## 2020-03-09 PROCEDURE — 3046F HEMOGLOBIN A1C LEVEL >9.0%: CPT | Performed by: INTERNAL MEDICINE

## 2020-03-09 RX ORDER — CYCLOBENZAPRINE HCL 10 MG
10 TABLET ORAL 3 TIMES DAILY PRN
Qty: 90 TABLET | Refills: 0 | Status: SHIPPED | OUTPATIENT
Start: 2020-03-09 | End: 2020-03-16

## 2020-03-09 NOTE — TELEPHONE ENCOUNTER
Received new referral for Smoking Cessation from recent hospitalization at [facility]. Called patient and scheduled patient for next week Monday 3/16/20 at 1pm. Will request updated referral from PCP as patient does not follow with hospitalist.    Patient was also referred for Diabetes Management, however, he was previously referred by PCP and neglected to follow up with us after several scheduled and cancelled appointments. Advised patient to follow up with PCP regarding Diabetes for now and we will focus on Smoking Cessation.   He is on his way for follow up appt with PCP today at 2pm.

## 2020-03-09 NOTE — PROGRESS NOTES
Visit Information    Have you changed or started any medications since your last visit including any over-the-counter medicines, vitamins, or herbal medicines? no   Are you having any side effects from any of your medications? -  no  Have you stopped taking any of your medications? Is so, why? -  no    Have you seen any other physician or provider since your last visit? No  Have you had any other diagnostic tests since your last visit? No  Have you been seen in the emergency room and/or had an admission to a hospital since we last saw you? Yes - Records Obtained  Have you had your routine dental cleaning in the past 6 months? no    Have you activated your [employer] account? If not, what are your barriers?  Yes     Patient Care Team:  Nataliia Griffin DO as PCP - General (Family Medicine)  Nataliia Griffin DO as PCP - [facility]  Dallin Hi MD as Surgeon (Surgical Oncology)  Martine Dean DPM as Surgeon (Podiatry)  Jay Florian MD as Consulting Physician (Ophthalmology)  Sparkle Mosqueda MD as Consulting Physician (Gastroenterology)  [address] [place], [place] [address]OLGA Castillo as Dietitian    Medical History Review  Past Medical, Family, and Social History reviewed and does contribute to the patient presenting condition    Health Maintenance   Topic Date Due    Hepatitis C screen  1955    HIV screen  10/10/1972    Hepatitis B vaccine (1 of 3 - Risk 3-dose series) 10/10/1976    Shingles Vaccine (1 of 2) 10/10/2007    Diabetic retinal exam  03/18/2016    Colon cancer screen colonoscopy  04/26/2017    Low dose CT lung screening  01/20/2018    Lipid screen  06/24/2019    Flu vaccine (1) 09/01/2019    Diabetic microalbuminuria test  01/10/2020    Annual Wellness Visit (AWV)  11/11/2020 (Originally 5/29/2019)    A1C test (Diabetic or Prediabetic)  05/24/2020    Diabetic foot exam  11/11/2020    Potassium monitoring  02/25/2021    Creatinine monitoring  02/25/2021    DTaP/Tdap/Td vaccine (2 - Td) 07/01/2029    Pneumococcal 0-64 years Vaccine  Completed    Hepatitis A vaccine  Aged Out    Hib vaccine  Aged Out    Meningococcal (ACWY) vaccine  Aged Out

## 2020-03-12 ENCOUNTER — HOSPITAL ENCOUNTER (EMERGENCY)
Age: 65
Discharge: HOME | End: 2020-03-12
Attending: EMERGENCY MEDICINE
Payer: MEDICARE

## 2020-03-12 VITALS
HEART RATE: 84 BPM | BODY MASS INDEX: 21.87 KG/M2 | WEIGHT: 165 LBS | SYSTOLIC BLOOD PRESSURE: 127 MMHG | DIASTOLIC BLOOD PRESSURE: 60 MMHG | RESPIRATION RATE: 16 BRPM | HEIGHT: 73 IN | TEMPERATURE: 97.9 F | OXYGEN SATURATION: 93 %

## 2020-03-12 LAB
ABSOLUTE EOS #: 0.12 K/UL (ref 0–0.44)
ABSOLUTE IMMATURE GRANULOCYTE: 0.06 K/UL (ref 0–0.3)
ABSOLUTE LYMPH #: 2.37 K/UL (ref 1.1–3.7)
ABSOLUTE MONO #: 0.4 K/UL (ref 0.1–1.2)
ALBUMIN SERPL-MCNC: 3.4 G/DL (ref 3.5–5.2)
ALBUMIN/GLOBULIN RATIO: ABNORMAL (ref 1–2.5)
ALP BLD-CCNC: 127 U/L (ref 40–129)
ALT SERPL-CCNC: 14 U/L (ref 5–41)
ANION GAP SERPL CALCULATED.3IONS-SCNC: 18 MMOL/L (ref 9–17)
AST SERPL-CCNC: 12 U/L
BASOPHILS # BLD: 0 % (ref 0–2)
BASOPHILS ABSOLUTE: 0.05 K/UL (ref 0–0.2)
BILIRUB SERPL-MCNC: <0.1 MG/DL (ref 0.3–1.2)
BILIRUBIN DIRECT: <0.08 MG/DL
BILIRUBIN, INDIRECT: ABNORMAL MG/DL (ref 0–1)
BUN BLDV-MCNC: 20 MG/DL (ref 8–23)
BUN/CREAT BLD: 26 (ref 9–20)
CALCIUM SERPL-MCNC: 8.7 MG/DL (ref 8.6–10.4)
CHLORIDE BLD-SCNC: 92 MMOL/L (ref 98–107)
CO2: 18 MMOL/L (ref 20–31)
CREAT SERPL-MCNC: 0.78 MG/DL (ref 0.7–1.2)
DIFFERENTIAL TYPE: ABNORMAL
EOSINOPHILS RELATIVE PERCENT: 1 % (ref 1–4)
ETHANOL PERCENT: 0.03 %
ETHANOL PERCENT: 0.15 %
ETHANOL: 149 MG/DL
ETHANOL: 25 MG/DL
GFR AFRICAN AMERICAN: >60 ML/MIN
GFR NON-AFRICAN AMERICAN: >60 ML/MIN
GFR SERPL CREATININE-BSD FRML MDRD: ABNORMAL ML/MIN/{1.73_M2}
GFR SERPL CREATININE-BSD FRML MDRD: ABNORMAL ML/MIN/{1.73_M2}
GLOBULIN: ABNORMAL G/DL (ref 1.5–3.8)
GLUCOSE BLD-MCNC: 314 MG/DL (ref 70–99)
HCT VFR BLD CALC: 38.6 % (ref 40.7–50.3)
HEMOGLOBIN: 12.2 G/DL (ref 13–17)
IMMATURE GRANULOCYTES: 1 %
LYMPHOCYTES # BLD: 20 % (ref 24–43)
MCH RBC QN AUTO: 29.5 PG (ref 25.2–33.5)
MCHC RBC AUTO-ENTMCNC: 31.6 G/DL (ref 28.4–34.8)
MCV RBC AUTO: 93.2 FL (ref 82.6–102.9)
MONOCYTES # BLD: 3 % (ref 3–12)
NRBC AUTOMATED: 0 PER 100 WBC
PDW BLD-RTO: 12.8 % (ref 11.8–14.4)
PLATELET # BLD: 331 K/UL (ref 138–453)
PLATELET ESTIMATE: ABNORMAL
PMV BLD AUTO: 9.6 FL (ref 8.1–13.5)
POTASSIUM SERPL-SCNC: 4.2 MMOL/L (ref 3.7–5.3)
RBC # BLD: 4.14 M/UL (ref 4.21–5.77)
RBC # BLD: ABNORMAL 10*6/UL
SEG NEUTROPHILS: 75 % (ref 36–65)
SEGMENTED NEUTROPHILS ABSOLUTE COUNT: 8.62 K/UL (ref 1.5–8.1)
SODIUM BLD-SCNC: 128 MMOL/L (ref 135–144)
TOTAL PROTEIN: 6.7 G/DL (ref 6.4–8.3)
WBC # BLD: 11.6 K/UL (ref 3.5–11.3)
WBC # BLD: ABNORMAL 10*3/UL

## 2020-03-12 PROCEDURE — 2580000003 HC RX 258: Performed by: NURSE PRACTITIONER

## 2020-03-12 PROCEDURE — 36415 COLL VENOUS BLD VENIPUNCTURE: CPT

## 2020-03-12 PROCEDURE — 85025 COMPLETE CBC W/AUTO DIFF WBC: CPT

## 2020-03-12 PROCEDURE — 99284 EMERGENCY DEPT VISIT MOD MDM: CPT

## 2020-03-12 PROCEDURE — G0480 DRUG TEST DEF 1-7 CLASSES: HCPCS

## 2020-03-12 PROCEDURE — 80048 BASIC METABOLIC PNL TOTAL CA: CPT

## 2020-03-12 PROCEDURE — 80076 HEPATIC FUNCTION PANEL: CPT

## 2020-03-12 RX ORDER — SODIUM CHLORIDE 9 MG/ML
INJECTION, SOLUTION INTRAVENOUS CONTINUOUS
Status: ACTIVE | OUTPATIENT
Start: 2020-03-12 | End: 2020-03-12

## 2020-03-12 RX ADMIN — SODIUM CHLORIDE: 9 INJECTION, SOLUTION INTRAVENOUS at 19:10

## 2020-03-12 NOTE — ED PROVIDER NOTES
.[facility] ED  eMERGENCY dEPARTMENT eNCOUnter      Pt Name: Ramone Raygoza  MRN: [id number]  [place] 1955  Date of evaluation: 3/12/2020  Provider: MARCELO Rodas [id number]       Chief Complaint   Patient presents with    Alcohol Intoxication         HISTORY OF PRESENT ILLNESS  (Location/Symptom, Timing/Onset, Context/Setting, Quality, Duration, Modifying Factors, Severity.)   Ramone Raygoza is a 60 y.o. male who presents to the emergency department via EMS for alcohol intoxication. He was found on his street outside of his truck by [employer]. He had fallen due to being intoxicated. He has a prosthetic leg which he states caused him to fall. States he had 2 40 oz beers at a friend's house today. The friend lived a street over. he drove home. Denies hitting his head and LOC. Denies injury with the fall. Denies pain to his head, neck, back, chest, abdomen, hips. Denies pain at this time. Nursing Notes were reviewed. ALLERGIES     Gabapentin    CURRENT MEDICATIONS       Previous Medications    ALBUTEROL SULFATE HFA (VENTOLIN HFA) 108 (90 BASE) MCG/ACT INHALER    Inhale 2 puffs into the lungs every 6 hours as needed for Wheezing    APIXABAN (ELIQUIS) 5 MG TABS TABLET    Take 1 tablet by mouth 2 times daily    ATORVASTATIN (LIPITOR) 40 MG TABLET    Take 1 tablet by mouth daily    BLOOD GLUCOSE TEST STRIPS (ASCENSIA AUTODISC VI;ONE TOUCH ULTRA TEST VI) STRIP    TID TO CHECK ELEVATED BLOOD SUGARS Use with associated glucose meter.     CYCLOBENZAPRINE (FLEXERIL) 10 MG TABLET    Take 1 tablet by mouth 3 times daily as needed for Muscle spasms    DIPHENHYDRAMINE-APAP, SLEEP, (TYLENOL PM EXTRA STRENGTH)  MG TABLET    Take 1 tablet by mouth nightly as needed for Sleep    GLUCOSE MONITORING KIT (FREESTYLE) MONITORING KIT    1 kit by Does not apply route daily To check hyperglycemia /fluctating sugars    INSULIN GLARGINE (LANTUS) 100 UNIT/ML INJECTION VIAL    Inject 25 [03/12/20 1846]   BP Temp Temp Source Pulse Resp SpO2 Height Weight   114/64 97.9 °F (36.6 °C) Oral 84 16 -- 6' 1\" (1.854 m) 165 lb (74.8 kg)     Physical Exam  Vitals signs reviewed. Constitutional:       General: He is not in acute distress. Appearance: He is well-developed. He is not diaphoretic. HENT:      Head: Atraumatic. Right Ear: External ear normal.      Left Ear: External ear normal.      Nose: Nose normal.      Mouth/Throat:      Mouth: Mucous membranes are moist.      Pharynx: Oropharynx is clear. Eyes:      Extraocular Movements: Extraocular movements intact. Conjunctiva/sclera: Conjunctivae normal.      Pupils: Pupils are equal, round, and reactive to light. Cardiovascular:      Rate and Rhythm: Normal rate and regular rhythm. Pulses: Normal pulses. Heart sounds: Normal heart sounds. Pulmonary:      Effort: Pulmonary effort is normal. No respiratory distress. Breath sounds: Normal breath sounds. No wheezing or rales. Abdominal:      General: There is no distension. Palpations: Abdomen is soft. Tenderness: There is no abdominal tenderness. Musculoskeletal:      Cervical back: He exhibits no tenderness, no bony tenderness and no pain. Thoracic back: He exhibits no tenderness, no bony tenderness and no pain. Lumbar back: He exhibits no tenderness, no bony tenderness and no pain. Comments: ELIOKA   Skin:     General: Skin is warm and dry. Capillary Refill: Capillary refill takes less than 2 seconds. Findings: No rash. Neurological:      General: No focal deficit present. Mental Status: He is alert and oriented to person, place, and time. GCS: GCS eye subscore is 4. GCS verbal subscore is 5. GCS motor subscore is 6. Cranial Nerves: Cranial nerves are intact. No cranial nerve deficit. Motor: Motor function is intact. No weakness. Coordination: Coordination is intact.    Psychiatric:         Behavior: extremity ([facility] [address] L30.975R    Hyperglycemia R73.9    Moderate protein malnutrition (HCC) E44.0    Essential hypertension I10    Uncontrolled type 2 diabetes mellitus with hyperglycemia ([facility] [address] E11.65    History of pulmonary embolism - 2017 Z86. [address] Atelectasis J98.11         DISPOSITION/PLAN   DISPOSITION  DISCHARGE      PATIENT REFERRED TO:   DO Rasheeda Leblanc [address] [place] [address]    Schedule an appointment as soon as possible for a visit       [facility] ED  [address] [facility]  [phone]          DISCHARGE MEDICATIONS:     New Prescriptions    No medications on file           (Please note that portions of this note were completed with a voice recognition program.  Efforts were made to edit the dictations but occasionally words are mis-transcribed.)    Arlen Schaumann, APRN - CNP Arlen Schaumann, APRN - CNP  03/13/20 0004

## 2020-03-12 NOTE — ED NOTES
Bed: 21  Expected date:   Expected time:   Means of arrival:   Comments:  jakub Arriola, [facility]  03/12/20 7120

## 2020-03-12 NOTE — ED NOTES
Pt arrives via EMS was found outside of his truck intoxicated, admits to drinking 2 \"big\" beers at his friends few street over. Pt is alert, answering questions appropriately. Pt is in poor hygiene. Pt denies any c/o.       Henrry Rosario RN  03/12/20 9878

## 2020-03-13 ENCOUNTER — TELEPHONE (OUTPATIENT)
Dept: OTHER | Facility: CLINIC | Age: 65
End: 2020-03-13

## 2020-03-13 ASSESSMENT — ENCOUNTER SYMPTOMS
VOMITING: 0
FACIAL SWELLING: 0
BACK PAIN: 0
SHORTNESS OF BREATH: 0
ABDOMINAL PAIN: 0
COLOR CHANGE: 0
COUGH: 0
PHOTOPHOBIA: 0
VOICE CHANGE: 0
TROUBLE SWALLOWING: 0
EYE PAIN: 0
NAUSEA: 0

## 2020-03-13 NOTE — ED PROVIDER NOTES
eMERGENCY dEPARTMENT eNCOUnter   [address] [place] Name: Macario Merlin  MRN: [id number]  [place] 1955  Date of evaluation: 3/12/20     Macario Merlin is a 60 y.o. male with CC: Alcohol Intoxication      Based on the medical record the care appears appropriate. I was personally available for consultation in the Emergency Department.     Lester Martinez MD  Attending Emergency Physician                    Lester Martinez MD  03/12/20 6476

## 2020-03-14 ASSESSMENT — ENCOUNTER SYMPTOMS
BLOOD IN STOOL: 0
SPUTUM PRODUCTION: 1
CONSTIPATION: 0
RHINORRHEA: 1
TROUBLE SWALLOWING: 0
WHEEZING: 1
ANAL BLEEDING: 0
ABDOMINAL PAIN: 0
CHEST TIGHTNESS: 1
COUGH: 1
SHORTNESS OF BREATH: 1
SORE THROAT: 0
VOICE CHANGE: 0
DIARRHEA: 0

## 2020-03-14 ASSESSMENT — COPD QUESTIONNAIRES: COPD: 1

## 2020-03-14 NOTE — PROGRESS NOTES
TRYING TO [facility]    Tobacco abuse       Past Surgical History:   Procedure Laterality Date    COLONOSCOPY  05/11/2015    hyperplastic polyp    COLONOSCOPY  01/26/2017    ESOPHAGECTOMY      cancer    FOOT SURGERY Right 11/03/2016    I & D heel    FOOT SURGERY Right 12/31/2016    I & D    FRACTURE SURGERY Left 9/5/2015    humerus left, left leg    LEG AMPUTATION BELOW KNEE Right 01/21/2017    TOE AMPUTATION Right 2014    rt 3rd through 5th digits    TOE AMPUTATION Left 5/26/2016    left foot 5th toe    UPPER GASTROINTESTINAL ENDOSCOPY      5/14/13- with dilation    VASCULAR SURGERY Right 01/16/2017    foot guillotine amputation       Family History   Problem Relation Age of Onset    Diabetes Mother     Cancer Mother     Alcohol Abuse Father     Cancer Sister     Alcohol Abuse Maternal Aunt     Alcohol Abuse Maternal Uncle     Alcohol Abuse Paternal Aunt        Social History     Tobacco Use    Smoking status: Current Some Day Smoker     Packs/day: 1.00     Years: 30.00     Pack years: 30.00     Types: Cigarettes    Smokeless tobacco: Former User     Types: Chew     Quit date: 1980    Tobacco comment: pt states has cut down a lot. Had 1 cigarette yesterday   Substance Use Topics    Alcohol use:  Yes     Alcohol/week: 0.0 standard drinks     Frequency: Never     Comment: 1 beer yesterday, states occ      Current Outpatient Medications   Medication Sig Dispense Refill    metFORMIN (GLUCOPHAGE) 500 MG tablet Take 1 tablet by mouth 2 times daily (with meals) 180 tablet 5    cyclobenzaprine (FLEXERIL) 10 MG tablet Take 1 tablet by mouth 3 times daily as needed for Muscle spasms 90 tablet 0    apixaban (ELIQUIS) 5 MG TABS tablet Take 1 tablet by mouth 2 times daily 180 tablet 1    ipratropium-albuterol (DUONEB) 0.5-2.5 (3) MG/3ML SOLN nebulizer solution Inhale 3 mLs into the lungs every 4 hours (while awake) 120 mL 0    insulin glargine (LANTUS) 100 UNIT/ML injection vial Inject 25 Units into the skin 2 times daily 1 vial 3    insulin lispro (HUMALOG) 100 UNIT/ML injection vial Inject 0-18 Units into the skin 3 times daily (with meals) 1 vial 3    insulin lispro (HUMALOG) 100 UNIT/ML injection vial Inject 0-9 Units into the skin nightly 1 vial 3    atorvastatin (LIPITOR) 40 MG tablet Take 1 tablet by mouth daily 90 tablet 3    nicotine (NICODERM CQ) 21 MG/24HR Place 1 patch onto the skin daily as needed (if patient smokes/requests nicotine replacement therapy) 30 patch 3    pantoprazole (PROTONIX) 40 MG tablet TAKE 1 TABLET BY MOUTH EVERY MORNING BEFORE BREAKFAST 90 tablet 5    diphenhydrAMINE-APAP, sleep, (TYLENOL PM EXTRA STRENGTH)  MG tablet Take 1 tablet by mouth nightly as needed for Sleep      albuterol sulfate HFA (VENTOLIN HFA) 108 (90 Base) MCG/ACT inhaler Inhale 2 puffs into the lungs every 6 hours as needed for Wheezing 1 Inhaler 3    TRUEPLUS LANCETS 30G MISC Inject 1 each into the skin 3 times daily TO CHECK FLUCTUATING BLOOD SUGARS IE HYPERGLYCEMIA 300 each 11    blood glucose test strips (ASCENSIA AUTODISC VI;ONE TOUCH ULTRA TEST VI) strip TID TO CHECK ELEVATED BLOOD SUGARS Use with associated glucose meter. 100 strip 11    glucose monitoring kit (FREESTYLE) monitoring kit 1 kit by Does not apply route daily To check hyperglycemia /fluctating sugars 1 kit 0    Insulin Pen Needle 32G X 4 MM MISC 1 each by Does not apply route daily 120 each 3     No current facility-administered medications for this visit.       Allergies   Allergen Reactions    Gabapentin Other (See Comments)     dizziness          Health Maintenance   Topic Date Due    Hepatitis C screen  1955    HIV screen  10/10/1972    Hepatitis B vaccine (1 of 3 - Risk 3-dose series) 10/10/1976    Shingles Vaccine (1 of 2) 10/10/2007    Diabetic retinal exam  03/18/2016    Colon cancer screen colonoscopy  04/26/2017    Low dose CT lung screening  01/20/2018    Lipid screen  06/24/2019    Diabetic Eyes:      Pupils: Pupils are equal, round, and reactive to light. Neck:      Musculoskeletal: Decreased range of motion. No spinous process tenderness or muscular tenderness. Vascular: Carotid bruit present. No JVD. Trachea: Trachea and phonation normal.   Cardiovascular:      Rate and Rhythm: Regular rhythm. Tachycardia present. No extrasystoles are present. Pulses:           Carotid pulses are 1+ on the right side and 1+ on the left side. Radial pulses are 1+ on the right side and 1+ on the left side. Femoral pulses are 1+ on the right side and 1+ on the left side. Popliteal pulses are 1+ on the right side and 1+ on the left side. Dorsalis pedis pulses are 1+ on the right side and 1+ on the left side. Posterior tibial pulses are 1+ on the right side and 1+ on the left side. Heart sounds: Normal heart sounds, S1 normal and S2 normal.   Pulmonary:      Effort: Pulmonary effort is normal. No bradypnea, prolonged expiration or respiratory distress. Breath sounds: Decreased air movement present. Decreased breath sounds and wheezing present. No rhonchi or rales. Abdominal:      General: Bowel sounds are normal.      Palpations: Abdomen is soft. There is no hepatomegaly or splenomegaly. Tenderness: There is no abdominal tenderness. Musculoskeletal:      Right lower leg: No edema. Left lower leg: No edema. Lymphadenopathy:      Cervical: No cervical adenopathy. Skin:     General: Skin is warm and dry. Findings: No rash. Neurological:      Mental Status: He is alert and oriented to person, place, and time. Cranial Nerves: Cranial nerves are intact. No cranial nerve deficit. Sensory: Sensory deficit present. Motor: Atrophy present. Coordination: Coordination is intact.       Gait: Gait abnormal.       /76 (Site: Left Upper Arm, Position: Sitting, Cuff Size: Medium Adult)   Pulse 102   Temp 97.2 °F (36.2 °C) answered. Ptvoiced understanding. Reviewed health maintenance. Instructed to continue currentmedications, diet and exercise. Patient agreed with treatment plan. Follow up asdirected.      Electronically signed by Renetta Chanel DO on 3/14/2020 at 4:25 PM

## 2020-03-16 ENCOUNTER — TELEPHONE (OUTPATIENT)
Dept: PHARMACY | Age: 65
End: 2020-03-16

## 2020-03-25 PROBLEM — K21.9 GERD (GASTROESOPHAGEAL REFLUX DISEASE): Status: RESOLVED | Noted: 2020-03-25 | Resolved: 2020-03-24

## 2020-04-12 ENCOUNTER — APPOINTMENT (OUTPATIENT)
Dept: GENERAL RADIOLOGY | Age: 65
End: 2020-04-12
Payer: MEDICARE

## 2020-04-12 ENCOUNTER — HOSPITAL ENCOUNTER (EMERGENCY)
Age: 65
Discharge: HOME | End: 2020-04-12
Attending: EMERGENCY MEDICINE
Payer: MEDICARE

## 2020-04-12 VITALS
OXYGEN SATURATION: 94 % | DIASTOLIC BLOOD PRESSURE: 65 MMHG | TEMPERATURE: 98.5 F | WEIGHT: 170 LBS | HEART RATE: 116 BPM | BODY MASS INDEX: 22.43 KG/M2 | SYSTOLIC BLOOD PRESSURE: 150 MMHG | RESPIRATION RATE: 22 BRPM

## 2020-04-12 LAB
ABSOLUTE EOS #: 0.15 K/UL (ref 0–0.44)
ABSOLUTE IMMATURE GRANULOCYTE: 0.08 K/UL (ref 0–0.3)
ABSOLUTE LYMPH #: 1.51 K/UL (ref 1.1–3.7)
ABSOLUTE MONO #: 0.58 K/UL (ref 0.1–1.2)
ANION GAP SERPL CALCULATED.3IONS-SCNC: 15 MMOL/L (ref 9–17)
BASOPHILS # BLD: 1 % (ref 0–2)
BASOPHILS ABSOLUTE: 0.05 K/UL (ref 0–0.2)
BUN BLDV-MCNC: 13 MG/DL (ref 8–23)
BUN/CREAT BLD: 13 (ref 9–20)
CALCIUM SERPL-MCNC: 8.8 MG/DL (ref 8.6–10.4)
CHLORIDE BLD-SCNC: 98 MMOL/L (ref 98–107)
CO2: 23 MMOL/L (ref 20–31)
CREAT SERPL-MCNC: 1 MG/DL (ref 0.7–1.2)
DIFFERENTIAL TYPE: ABNORMAL
EOSINOPHILS RELATIVE PERCENT: 2 % (ref 1–4)
GFR AFRICAN AMERICAN: >60 ML/MIN
GFR NON-AFRICAN AMERICAN: >60 ML/MIN
GFR SERPL CREATININE-BSD FRML MDRD: ABNORMAL ML/MIN/{1.73_M2}
GFR SERPL CREATININE-BSD FRML MDRD: ABNORMAL ML/MIN/{1.73_M2}
GLUCOSE BLD-MCNC: 344 MG/DL (ref 70–99)
HCT VFR BLD CALC: 40.2 % (ref 40.7–50.3)
HEMOGLOBIN: 13 G/DL (ref 13–17)
IMMATURE GRANULOCYTES: 1 %
LYMPHOCYTES # BLD: 17 % (ref 24–43)
MCH RBC QN AUTO: 29.5 PG (ref 25.2–33.5)
MCHC RBC AUTO-ENTMCNC: 32.3 G/DL (ref 28.4–34.8)
MCV RBC AUTO: 91.4 FL (ref 82.6–102.9)
MONOCYTES # BLD: 7 % (ref 3–12)
NRBC AUTOMATED: 0 PER 100 WBC
PDW BLD-RTO: 13.3 % (ref 11.8–14.4)
PLATELET # BLD: 330 K/UL (ref 138–453)
PLATELET ESTIMATE: ABNORMAL
PMV BLD AUTO: 9.3 FL (ref 8.1–13.5)
POTASSIUM SERPL-SCNC: 4.6 MMOL/L (ref 3.7–5.3)
RBC # BLD: 4.4 M/UL (ref 4.21–5.77)
RBC # BLD: ABNORMAL 10*6/UL
SEG NEUTROPHILS: 72 % (ref 36–65)
SEGMENTED NEUTROPHILS ABSOLUTE COUNT: 6.57 K/UL (ref 1.5–8.1)
SODIUM BLD-SCNC: 136 MMOL/L (ref 135–144)
WBC # BLD: 8.9 K/UL (ref 3.5–11.3)
WBC # BLD: ABNORMAL 10*3/UL

## 2020-04-12 PROCEDURE — 85025 COMPLETE CBC W/AUTO DIFF WBC: CPT

## 2020-04-12 PROCEDURE — 96374 THER/PROPH/DIAG INJ IV PUSH: CPT

## 2020-04-12 PROCEDURE — 6360000002 HC RX W HCPCS: Performed by: EMERGENCY MEDICINE

## 2020-04-12 PROCEDURE — 96375 TX/PRO/DX INJ NEW DRUG ADDON: CPT

## 2020-04-12 PROCEDURE — 80048 BASIC METABOLIC PNL TOTAL CA: CPT

## 2020-04-12 PROCEDURE — 71045 X-RAY EXAM CHEST 1 VIEW: CPT

## 2020-04-12 PROCEDURE — 99285 EMERGENCY DEPT VISIT HI MDM: CPT

## 2020-04-12 RX ORDER — HYDROCODONE BITARTRATE AND ACETAMINOPHEN 5; 325 MG/1; MG/1
1 TABLET ORAL EVERY 6 HOURS PRN
Qty: 20 TABLET | Refills: 0 | Status: SHIPPED | OUTPATIENT
Start: 2020-04-12 | End: 2020-04-17

## 2020-04-12 RX ORDER — METHYLPREDNISOLONE SODIUM SUCCINATE 125 MG/2ML
125 INJECTION, POWDER, LYOPHILIZED, FOR SOLUTION INTRAMUSCULAR; INTRAVENOUS ONCE
Status: COMPLETED | OUTPATIENT
Start: 2020-04-12 | End: 2020-04-12

## 2020-04-12 RX ORDER — PREDNISONE 10 MG/1
TABLET ORAL
Qty: 30 TABLET | Refills: 0 | Status: SHIPPED | OUTPATIENT
Start: 2020-04-12 | End: 2020-07-27 | Stop reason: ALTCHOICE

## 2020-04-12 RX ORDER — AZITHROMYCIN 250 MG/1
TABLET, FILM COATED ORAL
Qty: 1 PACKET | Refills: 0 | Status: SHIPPED | OUTPATIENT
Start: 2020-04-12 | End: 2020-07-27 | Stop reason: ALTCHOICE

## 2020-04-12 RX ORDER — MORPHINE SULFATE 4 MG/ML
4 INJECTION, SOLUTION INTRAMUSCULAR; INTRAVENOUS ONCE
Status: COMPLETED | OUTPATIENT
Start: 2020-04-12 | End: 2020-04-12

## 2020-04-12 RX ADMIN — MORPHINE SULFATE 4 MG: 4 INJECTION, SOLUTION INTRAMUSCULAR; INTRAVENOUS at 02:54

## 2020-04-12 RX ADMIN — METHYLPREDNISOLONE SODIUM SUCCINATE 125 MG: 125 INJECTION, POWDER, FOR SOLUTION INTRAMUSCULAR; INTRAVENOUS at 03:15

## 2020-04-12 ASSESSMENT — ENCOUNTER SYMPTOMS
EYE DISCHARGE: 0
VOMITING: 0
FACIAL SWELLING: 0
ABDOMINAL PAIN: 0
SHORTNESS OF BREATH: 1
EYE REDNESS: 0
CONSTIPATION: 0
COLOR CHANGE: 0
DIARRHEA: 0

## 2020-04-12 ASSESSMENT — PAIN SCALES - GENERAL
PAINLEVEL_OUTOF10: 9
PAINLEVEL_OUTOF10: 8

## 2020-04-12 NOTE — ED PROVIDER NOTES
Problem Relation Age of Onset    Diabetes Mother     Cancer Mother     Alcohol Abuse Father     Cancer Sister     Alcohol Abuse Maternal Aunt     Alcohol Abuse Maternal Uncle     Alcohol Abuse Paternal Aunt      Family Status   Relation Name Status    Mother  Alive    Father  [de-identified]    Sister  (Not Specified)   Jose Ramon Rolle  (Not Specified)    Jhony  (Not Specified)    Tena  (Not Specified)        SOCIAL HISTORY      reports that he has been smoking cigarettes. He has a 30.00 pack-year smoking history. He quit smokeless tobacco use about 40 years ago. His smokeless tobacco use included chew. He reports current alcohol use. He reports current drug use. Drug: Marijuana. REVIEW OF SYSTEMS    (2-9 systems for level 4, 10 or more for level 5)     Review of Systems   Constitutional: Negative for chills, fatigue and fever. HENT: Negative for congestion, ear discharge and facial swelling. Eyes: Negative for discharge and redness. Respiratory: Positive for shortness of breath. Cardiovascular: Negative for chest pain. Gastrointestinal: Negative for abdominal pain, constipation, diarrhea and vomiting. Genitourinary: Negative for dysuria and hematuria. Musculoskeletal: Negative for arthralgias. Skin: Negative for color change and rash. Neurological: Negative for syncope, numbness and headaches. Hematological: Negative for adenopathy. Psychiatric/Behavioral: Negative for confusion. The patient is not nervous/anxious. Except as noted above the remainder of the review of systems was reviewed and negative. PHYSICAL EXAM    (up to 7 for level 4, 8 or more for level 5)     Vitals:    04/12/20 0236   BP: (!) 150/65   Pulse: 116   Resp: 22   Temp: 98.5 °F (36.9 °C)   TempSrc: Oral   SpO2: 94%   Weight: 170 lb (77.1 kg)       Physical Exam  Vitals signs reviewed. Constitutional:       General: He is not in acute distress. Appearance: He is well-developed. He is not diaphoretic. Lymphocytes 17 (*)     Immature Granulocytes 1 (*)     All other components within normal limits   BASIC METABOLIC PANEL - Abnormal; Notable for the following components:    Glucose 344 (*)     All other components within normal limits       All other labs were within normal range or not returned as of this dictation. EMERGENCY DEPARTMENT COURSE and DIFFERENTIAL DIAGNOSIS/MDM:   Vitals:    Vitals:    04/12/20 0236   BP: (!) 150/65   Pulse: 116   Resp: 22   Temp: 98.5 °F (36.9 °C)   TempSrc: Oral   SpO2: 94%   Weight: 170 lb (77.1 kg)       Orders Placed This Encounter   Medications    morphine sulfate (PF) injection 4 mg    methylPREDNISolone sodium (SOLU-MEDROL) injection 125 mg    azithromycin (ZITHROMAX) 250 MG tablet     Sig: Take 2 tablets (500 mg) on Day 1, followed by 1 tablet (250 mg) once daily on Days 2 through 5. Dispense:  1 packet     Refill:  0    predniSONE (DELTASONE) 10 MG tablet     Sig: Take 4 by mouth daily for 3 days then  3 by mouth daily for 3 days then  2 by mouth daily for 3 days then  1 by mouth daily for 3 days     Dispense:  30 tablet     Refill:  0    HYDROcodone-acetaminophen (NORCO) 5-325 MG per tablet     Sig: Take 1 tablet by mouth every 6 hours as needed for Pain for up to 5 days. Dispense:  20 tablet     Refill:  0       Medical Decision Making: The patient's has negative work-up including negative chest x-ray. My clinical impression is that his shortness of breath is due to COPD exacerbation. He was given IV Solu-Medrol and is prescribed prednisone and Zithromax for that issue and Norco for his neuropathic pain. Treatment diagnosis and follow-up were discussed with the patient. CONSULTS:  None    PROCEDURES:  None    FINAL IMPRESSION      1. COPD exacerbation (HCC)    2. Pain, phantom limb (HCC)    3.  Neuropathy          DISPOSITION/PLAN   DISPOSITION Decision To Discharge 04/12/2020 03:18:31 AM      PATIENT REFERRED TO:   Alicia Dickson DO  [id number]

## 2020-04-13 ENCOUNTER — CARE COORDINATION (OUTPATIENT)
Dept: CARE COORDINATION | Age: 65
End: 2020-04-13

## 2020-04-13 NOTE — CARE COORDINATION
COVID-19 Screening Initial Follow-up Note    Patient contacted regarding Victor M Cruz. Care Transition Nurse/ Ambulatory Care Manager contacted the patient by telephone to perform post discharge assessment. Verified name and  with patient as identifiers. Provided introduction to self, and explanation of the CTN/ACM role, and reason for call due to risk factors for infection and/or exposure to COVID-19. Symptoms reviewed with patient who verbalized the following symptoms: pain or aching joints, cough, shortness of breath and no new/worsening symptoms       Due to no new or worsening symptoms encounter was not routed to provider for escalation. Patient has following risk factors of: COPD. CTN/ACM reviewed discharge instructions, medical action plan and red flags such as increased shortness of breath, increasing fever and signs of decompensation with patient who verbalized understanding. Discussed exposure protocols and quarantine with CDC Guidelines What to do if you are sick with coronavirus disease  Patient was given an opportunity for questions and concerns. The patient agrees to contact the [facility] exposure line [phone], [facility] department [place] Department [facility] Health: ([phone]) and PCP office for questions related to their healthcare. CTN/ACM provided contact information for future reference. Reviewed and educated patient on any new and changed medications related to discharge diagnosis     Patient/family/caregiver given information for [facility] and agrees to enroll no  Patient's preferred e-mail:    Patient's preferred phone number:   Based on Loop alert triggers, patient will be contacted by nurse care manager for worsening symptoms. Plan for follow-up call in 14 days based on severity of symptoms and risk factors    Patient states that he is getting ready to go  his prescription. States he's not feeling any better or any worse.    Encouraged him to call his

## 2020-04-27 ENCOUNTER — CARE COORDINATION (OUTPATIENT)
Dept: CARE COORDINATION | Age: 65
End: 2020-04-27

## 2020-05-18 ENCOUNTER — HOSPITAL ENCOUNTER (EMERGENCY)
Age: 65
Discharge: HOME | End: 2020-05-18
Attending: EMERGENCY MEDICINE
Payer: MEDICARE

## 2020-05-18 VITALS
HEIGHT: 72 IN | SYSTOLIC BLOOD PRESSURE: 126 MMHG | WEIGHT: 170 LBS | TEMPERATURE: 98.8 F | HEART RATE: 94 BPM | OXYGEN SATURATION: 98 % | RESPIRATION RATE: 18 BRPM | BODY MASS INDEX: 23.03 KG/M2 | DIASTOLIC BLOOD PRESSURE: 72 MMHG

## 2020-05-18 PROCEDURE — 99282 EMERGENCY DEPT VISIT SF MDM: CPT

## 2020-05-18 RX ORDER — HYDROCODONE BITARTRATE AND ACETAMINOPHEN 5; 325 MG/1; MG/1
1 TABLET ORAL EVERY 6 HOURS PRN
Qty: 10 TABLET | Refills: 0 | Status: SHIPPED | OUTPATIENT
Start: 2020-05-18 | End: 2020-05-21

## 2020-05-18 ASSESSMENT — ENCOUNTER SYMPTOMS
SHORTNESS OF BREATH: 0
SORE THROAT: 0
COUGH: 0
NAUSEA: 0
VOMITING: 0
EYE DISCHARGE: 0
DIARRHEA: 0
RHINORRHEA: 0
EYE REDNESS: 0
COLOR CHANGE: 0

## 2020-05-18 ASSESSMENT — PAIN SCALES - GENERAL: PAINLEVEL_OUTOF10: 8

## 2020-05-18 ASSESSMENT — PAIN DESCRIPTION - LOCATION: LOCATION: LEG

## 2020-05-18 ASSESSMENT — PAIN DESCRIPTION - DESCRIPTORS: DESCRIPTORS: THROBBING

## 2020-05-18 ASSESSMENT — PAIN DESCRIPTION - PAIN TYPE: TYPE: CHRONIC PAIN

## 2020-05-18 ASSESSMENT — PAIN DESCRIPTION - ORIENTATION: ORIENTATION: LEFT

## 2020-05-19 ENCOUNTER — CARE COORDINATION (OUTPATIENT)
Dept: CARE COORDINATION | Age: 65
End: 2020-05-19

## 2020-05-19 NOTE — CARE COORDINATION
Patient contacted regarding recent discharge and COVID-19 risk   Care Transition Nurse/ Ambulatory Care Manager contacted the patient by telephone to perform post discharge assessment. Verified name and  with patient as identifiers. Patient has following risk factors of: COPD and diabetes. CTN/ACM reviewed discharge instructions, medical action plan and red flags related to discharge diagnosis. Reviewed and educated them on any new and changed medications related to discharge diagnosis. Advised obtaining a 90-day supply of all daily and as-needed medications. Education provided regarding infection prevention, and signs and symptoms of COVID-19 and when to seek medical attention with patient who verbalized understanding. Discussed exposure protocols and quarantine from [address] you at higher risk for severe illness  and given an opportunity for questions and concerns. The patient agrees to contact the COVID-19 hotline [phone] or PCP office for questions related to their healthcare. CTN/ACM provided contact information for future reference. From CDC: Are you at higher risk for severe illness?  Wash your hands often.  Avoid close contact (6 feet, which is about two arm lengths) with people who are sick.  Put distance between yourself and other people if COVID-19 is spreading in your community.  Clean and disinfect frequently touched surfaces.  Avoid all cruise travel and non-essential air travel.  Call your healthcare professional if you have concerns about COVID-19 and your underlying condition or if you are sick. For more information on steps you can take to protect yourself, see CDC's How to Protect Yourself    Spoke to patient, he feels much better.    Has not had a chance to  medications from the pharmacy, but plans to pick them up today  Denies any concerns   Declined Get Well Loop service  Encouraged to follow up with his provider     Plan for follow-up call in 7-14 days based on severity of symptoms and risk factors.

## 2020-05-19 NOTE — ED PROVIDER NOTES
[facility]    Tobacco abuse      Past Problem List  Patient Active Problem List   Diagnosis Code    Type 2 diabetes mellitus with diabetic polyneuropathy, with long-term current use of insulin ([facility] E11.42, Z79.4    Neuropathic pain, leg M79.2    Diabetic polyneuropathy ([facility] E11.42    Allergic rhinitis J30.9    Tobacco abuse Z72.0    COPD (chronic obstructive pulmonary disease) ([facility] [address] [facility] J44.9    Edentulous K00.0    Dysphagia R13.10    Lung nodules R91.8    Esophageal cancer ([facility]) C15.9    Fracture of humerus, left, closed S42.302A    Closed fracture of humerus S42.309A    DM type 2 with diabetic peripheral neuropathy ([facility]) E11.42    Chronic obstructive pulmonary disease ([facility]) J44.9    Hyperlipidemia E78.5    Gastroesophageal reflux disease K21.9    Chronic pain syndrome G89.4    Marijuana use F12.90    History of colon polyps Z86.010    Cellulitis of right heel L03.115    PVD (peripheral vascular disease) ([facility]) I73.9    Functional diarrhea K59.1    Sepsis due to methicillin resistant Staphylococcus aureus (MRSA) ([facility]) A41.02    History of esophageal cancer Z85.01    Osteomyelitis, chronic, ankle or foot ([facility] M86.679    PAD (peripheral artery disease) ([facility]) I73.9    Other pulmonary embolism without acute cor pulmonale ([facility]) I26.99    Carotid stenosis, asymptomatic, bilateral I65.23    Lower limb amputation status Z89.619    Fx humeral neck, right, closed, initial encounter S42.211A    Transient hypotension I95.9    Constipation due to opioid therapy K59.03, T40.2X5A    Acute kidney injury ([address] N17.9    Diabetic ulcer of left midfoot associated with type 2 diabetes mellitus, with fat layer exposed ([address] E11.621, F47.280    Complication of below knee amputation stump ([facility]) T87.9    COPD exacerbation ([facility]) J44.1    Hyponatremia E87.1    Pain, phantom limb ([facility]) G54.6    Below-knee amputation of right lower extremity ([facility]) L47.172X    Hyperglycemia R73.9    Moderate protein

## 2020-05-27 ENCOUNTER — TELEPHONE (OUTPATIENT)
Dept: FAMILY MEDICINE CLINIC | Age: 65
End: 2020-05-27

## 2020-05-28 RX ORDER — IPRATROPIUM BROMIDE AND ALBUTEROL SULFATE 2.5; .5 MG/3ML; MG/3ML
3 SOLUTION RESPIRATORY (INHALATION)
Qty: 120 ML | Refills: 0 | Status: SHIPPED | OUTPATIENT
Start: 2020-05-28 | End: 2020-12-14 | Stop reason: ALTCHOICE

## 2020-05-28 RX ORDER — ALBUTEROL SULFATE 90 UG/1
2 AEROSOL, METERED RESPIRATORY (INHALATION) EVERY 6 HOURS PRN
Qty: 1 INHALER | Refills: 3 | Status: ON HOLD | OUTPATIENT
Start: 2020-05-28 | End: 2020-07-28

## 2020-05-29 ENCOUNTER — CARE COORDINATION (OUTPATIENT)
Dept: CARE COORDINATION | Age: 65
End: 2020-05-29

## 2020-06-09 ENCOUNTER — TELEPHONE (OUTPATIENT)
Dept: FAMILY MEDICINE CLINIC | Age: 65
End: 2020-06-09

## 2020-06-09 NOTE — TELEPHONE ENCOUNTER
Patient daughter called stating he is not taking any medication with insulin. Patient living conditions are not safe. He has been drinking heavily everyday. Patient lives by himself. Daughter is concerned, and she doesn't know what to do. Daughter would like to talk to you regarding this but she lives in [place]. She doesn't know what to do.      Call daughter first @     [facility]   Phone: [phone]  Please Advise   Thank You

## 2020-06-15 NOTE — TELEPHONE ENCOUNTER
I talked to patient daughter. Pt daughter states that pt is not taking his insulin and is drinking every day. Pt daughter stated that is living conditions are not well he states there is animal feces and bugs in the house. She states that her father has slept outside in his front yard because he is to drunk to get into his house. Daughter is worried about her father and would like to know what steps she could do do stated that the pt can't live by himself. I advised pt daughter that I would send a message to dr. David Lamar and see what she would like you to do but in the mean time you can call the [employer] and do a welfare check on him.  Pt daughter agreed

## 2020-06-16 ENCOUNTER — TELEPHONE (OUTPATIENT)
Dept: FAMILY MEDICINE CLINIC | Age: 65
End: 2020-06-16

## 2020-06-16 NOTE — TELEPHONE ENCOUNTER
scar faxed over new rx request for: accu-chek lex plus meter    accu-chek lex plus test strip    accu-chek softclix lancets    BD single use swab

## 2020-06-17 RX ORDER — GLUCOSAM/CHON-MSM1/C/MANG/BOSW 500-416.6
1 TABLET ORAL 3 TIMES DAILY
Qty: 300 EACH | Refills: 11 | Status: SHIPPED | OUTPATIENT
Start: 2020-06-17 | End: 2020-06-19 | Stop reason: SDUPTHER

## 2020-06-19 RX ORDER — GLUCOSAM/CHON-MSM1/C/MANG/BOSW 500-416.6
1 TABLET ORAL 3 TIMES DAILY
Qty: 300 EACH | Refills: 11 | Status: ON HOLD | OUTPATIENT
Start: 2020-06-19 | End: 2021-01-11 | Stop reason: HOSPADM

## 2020-06-23 NOTE — TELEPHONE ENCOUNTER
Yes agree with welfare check,. unfortunately there is not much that can be done until he wants help or hurts himself. Welfare check and also could call health dept for house inspection to see if not habitable conditions.

## 2020-06-29 ENCOUNTER — APPOINTMENT (OUTPATIENT)
Dept: GENERAL RADIOLOGY | Age: 65
End: 2020-06-29
Payer: COMMERCIAL

## 2020-06-29 ENCOUNTER — HOSPITAL ENCOUNTER (EMERGENCY)
Age: 65
Discharge: HOME | End: 2020-06-30
Attending: EMERGENCY MEDICINE
Payer: COMMERCIAL

## 2020-06-29 VITALS
BODY MASS INDEX: 22.53 KG/M2 | TEMPERATURE: 97.6 F | DIASTOLIC BLOOD PRESSURE: 67 MMHG | HEIGHT: 73 IN | SYSTOLIC BLOOD PRESSURE: 124 MMHG | RESPIRATION RATE: 18 BRPM | OXYGEN SATURATION: 97 % | HEART RATE: 93 BPM | WEIGHT: 170 LBS

## 2020-06-29 LAB
ABSOLUTE EOS #: 0.21 K/UL (ref 0–0.44)
ABSOLUTE IMMATURE GRANULOCYTE: 0.08 K/UL (ref 0–0.3)
ABSOLUTE LYMPH #: 2.03 K/UL (ref 1.1–3.7)
ABSOLUTE MONO #: 0.88 K/UL (ref 0.1–1.2)
ANION GAP SERPL CALCULATED.3IONS-SCNC: 14 MMOL/L (ref 9–17)
BASOPHILS # BLD: 1 % (ref 0–2)
BASOPHILS ABSOLUTE: 0.13 K/UL (ref 0–0.2)
BUN BLDV-MCNC: 20 MG/DL (ref 8–23)
BUN/CREAT BLD: 24 (ref 9–20)
CALCIUM SERPL-MCNC: 9.7 MG/DL (ref 8.6–10.4)
CHLORIDE BLD-SCNC: 90 MMOL/L (ref 98–107)
CHP ED QC CHECK: YES
CO2: 27 MMOL/L (ref 20–31)
CREAT SERPL-MCNC: 0.83 MG/DL (ref 0.7–1.2)
D-DIMER QUANTITATIVE: 0.39 MG/L FEU (ref 0–0.59)
DIFFERENTIAL TYPE: ABNORMAL
EOSINOPHILS RELATIVE PERCENT: 2 % (ref 1–4)
GFR AFRICAN AMERICAN: >60 ML/MIN
GFR NON-AFRICAN AMERICAN: >60 ML/MIN
GFR SERPL CREATININE-BSD FRML MDRD: ABNORMAL ML/MIN/{1.73_M2}
GFR SERPL CREATININE-BSD FRML MDRD: ABNORMAL ML/MIN/{1.73_M2}
GLUCOSE BLD-MCNC: 313 MG/DL (ref 75–110)
GLUCOSE BLD-MCNC: 443 MG/DL (ref 75–110)
GLUCOSE BLD-MCNC: 472 MG/DL (ref 75–110)
GLUCOSE BLD-MCNC: 478 MG/DL
GLUCOSE BLD-MCNC: 539 MG/DL (ref 70–99)
HCT VFR BLD CALC: 46.4 % (ref 40.7–50.3)
HEMOGLOBIN: 14.8 G/DL (ref 13–17)
IMMATURE GRANULOCYTES: 1 %
LYMPHOCYTES # BLD: 16 % (ref 24–43)
MCH RBC QN AUTO: 30.3 PG (ref 25.2–33.5)
MCHC RBC AUTO-ENTMCNC: 31.9 G/DL (ref 28.4–34.8)
MCV RBC AUTO: 94.9 FL (ref 82.6–102.9)
MONOCYTES # BLD: 7 % (ref 3–12)
MYOGLOBIN: 31 NG/ML (ref 28–72)
MYOGLOBIN: 34 NG/ML (ref 28–72)
NRBC AUTOMATED: 0 PER 100 WBC
PDW BLD-RTO: 13.6 % (ref 11.8–14.4)
PLATELET # BLD: 452 K/UL (ref 138–453)
PLATELET ESTIMATE: ABNORMAL
PMV BLD AUTO: 9.8 FL (ref 8.1–13.5)
POTASSIUM SERPL-SCNC: 5.4 MMOL/L (ref 3.7–5.3)
RBC # BLD: 4.89 M/UL (ref 4.21–5.77)
RBC # BLD: ABNORMAL 10*6/UL
SEG NEUTROPHILS: 73 % (ref 36–65)
SEGMENTED NEUTROPHILS ABSOLUTE COUNT: 9.61 K/UL (ref 1.5–8.1)
SODIUM BLD-SCNC: 131 MMOL/L (ref 135–144)
TROPONIN INTERP: ABNORMAL
TROPONIN INTERP: ABNORMAL
TROPONIN T: ABNORMAL NG/ML
TROPONIN T: ABNORMAL NG/ML
TROPONIN, HIGH SENSITIVITY: 26 NG/L (ref 0–22)
TROPONIN, HIGH SENSITIVITY: 27 NG/L (ref 0–22)
WBC # BLD: 12.9 K/UL (ref 3.5–11.3)
WBC # BLD: ABNORMAL 10*3/UL

## 2020-06-29 PROCEDURE — 96375 TX/PRO/DX INJ NEW DRUG ADDON: CPT

## 2020-06-29 PROCEDURE — 85379 FIBRIN DEGRADATION QUANT: CPT

## 2020-06-29 PROCEDURE — 93005 ELECTROCARDIOGRAM TRACING: CPT | Performed by: NURSE PRACTITIONER

## 2020-06-29 PROCEDURE — 71045 X-RAY EXAM CHEST 1 VIEW: CPT

## 2020-06-29 PROCEDURE — 82947 ASSAY GLUCOSE BLOOD QUANT: CPT

## 2020-06-29 PROCEDURE — 84484 ASSAY OF TROPONIN QUANT: CPT

## 2020-06-29 PROCEDURE — 6360000002 HC RX W HCPCS: Performed by: NURSE PRACTITIONER

## 2020-06-29 PROCEDURE — 6370000000 HC RX 637 (ALT 250 FOR IP): Performed by: NURSE PRACTITIONER

## 2020-06-29 PROCEDURE — 83874 ASSAY OF MYOGLOBIN: CPT

## 2020-06-29 PROCEDURE — 99285 EMERGENCY DEPT VISIT HI MDM: CPT

## 2020-06-29 PROCEDURE — 85025 COMPLETE CBC W/AUTO DIFF WBC: CPT

## 2020-06-29 PROCEDURE — 36415 COLL VENOUS BLD VENIPUNCTURE: CPT

## 2020-06-29 PROCEDURE — 80048 BASIC METABOLIC PNL TOTAL CA: CPT

## 2020-06-29 PROCEDURE — 96374 THER/PROPH/DIAG INJ IV PUSH: CPT

## 2020-06-29 RX ORDER — HYDROCODONE BITARTRATE AND ACETAMINOPHEN 5; 325 MG/1; MG/1
1 TABLET ORAL EVERY 6 HOURS PRN
Qty: 20 TABLET | Refills: 0 | Status: SHIPPED | OUTPATIENT
Start: 2020-06-29 | End: 2020-07-06

## 2020-06-29 RX ORDER — KETOROLAC TROMETHAMINE 15 MG/ML
15 INJECTION, SOLUTION INTRAMUSCULAR; INTRAVENOUS ONCE
Status: COMPLETED | OUTPATIENT
Start: 2020-06-29 | End: 2020-06-29

## 2020-06-29 RX ORDER — MORPHINE SULFATE 2 MG/ML
2 INJECTION, SOLUTION INTRAMUSCULAR; INTRAVENOUS ONCE
Status: COMPLETED | OUTPATIENT
Start: 2020-06-29 | End: 2020-06-29

## 2020-06-29 RX ADMIN — MORPHINE SULFATE 2 MG: 2 INJECTION, SOLUTION INTRAMUSCULAR; INTRAVENOUS at 21:21

## 2020-06-29 RX ADMIN — INSULIN HUMAN 7 UNITS: 100 INJECTION, SOLUTION PARENTERAL at 22:38

## 2020-06-29 RX ADMIN — KETOROLAC TROMETHAMINE 15 MG: 15 INJECTION, SOLUTION INTRAMUSCULAR; INTRAVENOUS at 23:39

## 2020-06-29 ASSESSMENT — PAIN SCALES - GENERAL
PAINLEVEL_OUTOF10: 10
PAINLEVEL_OUTOF10: 8
PAINLEVEL_OUTOF10: 10

## 2020-06-29 ASSESSMENT — PAIN DESCRIPTION - LOCATION: LOCATION: LEG

## 2020-06-29 ASSESSMENT — PAIN DESCRIPTION - ORIENTATION: ORIENTATION: LEFT;RIGHT

## 2020-06-29 NOTE — ED TRIAGE NOTES
Patient presents to ED with c/o bilateral leg pain that started last night. Patient with hx of neuropathy. Patient also states that he \"feels like his lungs are dry. \" Patients home medications reviewed in triage- patient states that he has not been taking some of his home medications as he is unable to afford them.

## 2020-06-30 LAB
EKG ATRIAL RATE: 79 BPM
EKG P AXIS: 59 DEGREES
EKG P-R INTERVAL: 128 MS
EKG Q-T INTERVAL: 366 MS
EKG QRS DURATION: 78 MS
EKG QTC CALCULATION (BAZETT): 419 MS
EKG R AXIS: 42 DEGREES
EKG T AXIS: 48 DEGREES
EKG VENTRICULAR RATE: 79 BPM

## 2020-06-30 ASSESSMENT — ENCOUNTER SYMPTOMS
SHORTNESS OF BREATH: 1
ABDOMINAL PAIN: 0
NAUSEA: 0
COUGH: 0
DIARRHEA: 0
SINUS PRESSURE: 0
COLOR CHANGE: 0
VOMITING: 0
PHOTOPHOBIA: 0
CONSTIPATION: 0

## 2020-06-30 NOTE — ED PROVIDER NOTES
[facility] ED  EMERGENCY DEPARTMENT ENCOUNTER   ATTENDING ATTESTATION     Pt Name: Kimberly Jorgensen  MRN: [id number]  [place] 1955  Date of evaluation: 6/29/20       Kimberly Jorgensen is a 60 y.o. male who presents with Leg Pain and Shortness of Breath      MDM:     82-year-old male presents with complaints of some shortness of breath and leg pain, as well as hyperglycemia. Patient's cardiac enzymes and d-dimer are unremarkable, his symptoms have improved. Plan is discharged with outpatient follow-up at his primary care physician for further glycemic control. Vitals:   Vitals:    06/29/20 1951   BP: 124/67   Pulse: 93   Resp: 18   Temp: 97.6 °F (36.4 °C)   TempSrc: Oral   SpO2: 97%   Weight: 170 lb (77.1 kg)   Height: 6' 1\" (1.854 m)         I personally evaluated and examined the patient in conjunction with the Midlevel provider and agree with the assessment, treatment plan, and disposition of the patient as recorded by the midlevel. I performed a history and physical examination of the patient and discussed management with the midlevel. I reviewed the midlevels note and agree with the documented findings and plan of care. Any areas of disagreement are noted on the chart. I was personally present for the key portions of any procedures. I have documented in the chart those procedures where I was not present during the key portions. I have personally reviewed all images and agree with the midlevel's interpretation. I have reviewed the emergency nurses triage note. I agree with the chief complaint, past medical history, past surgical history, allergies, medications, social and family history as documented unless otherwise noted.     Ahsan Echavarria MD  Attending Emergency  Physician                  Zenaida Kiser MD  06/29/20 [id number]

## 2020-06-30 NOTE — ED NOTES
Pt tells PCT at bedside he is too hot and wants to talk to a supervisor. Pt request air filter be taken out of his room. RN uses therapeutic communication to help patient. Thermostat turned down - patient appears satisfied.        Philippe Vincent RN  06/29/20 [id number]

## 2020-06-30 NOTE — ED PROVIDER NOTES
[facility] ED  EMERGENCY DEPARTMENT ENCOUNTER      Pt Name: Geovani Bright  MRN: [id number]  Jessicagfyamilet 1955  Date of evaluation: 6/30/20  CHIEF COMPLAINT       Chief Complaint   Patient presents with    Leg Pain    Shortness of Breath     HISTORY OF PRESENT ILLNESS   Via private auto with bilateral leg pain and chest discomfort with shortness of breath. Symptom started several weeks ago. He has a history of right below the knee amputation and has been having phantom pain for the past 2 weeks. He is also complaining of pain to the left lower leg. No redness or swelling. No history of clotting disorders. He is prescribed Eliquis but has not been taking that over the past several days because he is unable to afford his medication. Blood sugar is elevated over 500 on arrival he states he is also not been taking his insulin for the same reason. The history is provided by the patient. REVIEW OF SYSTEMS     Review of Systems   Constitutional: Negative for activity change, fatigue and fever. HENT: Negative for congestion and sinus pressure. Eyes: Negative for photophobia and visual disturbance. Respiratory: Positive for shortness of breath. Negative for cough. Cardiovascular: Positive for chest pain. Negative for palpitations. Gastrointestinal: Negative for abdominal pain, constipation, diarrhea, nausea and vomiting. Musculoskeletal: Negative for arthralgias and myalgias. Skin: Negative for color change and wound. Neurological: Negative for dizziness and headaches. Psychiatric/Behavioral: Negative for confusion and decreased concentration.      PASTMEDICAL HISTORY     Past Medical History:   Diagnosis Date    Allergic rhinitis     COPD [facility]chronic obstructive pulmonary disease) ([facility])     Diabetic neuropathy ([facility]     dr. Estuardo Uriarte, podiatrist    Dizziness     DM (diabetes mellitus) ([facility]     , endocrinologist    Esophageal cancer ([facility]     4-5 years ago    GERD 3 days then  3 by mouth daily for 3 days then  2 by mouth daily for 3 days then  1 by mouth daily for 3 days, Disp-30 tablet, R-0Print      metFORMIN (GLUCOPHAGE) 500 MG tablet Take 1 tablet by mouth 2 times daily (with meals), Disp-180 tablet, R-5**Patient requests 90 days supply**Normal      apixaban (ELIQUIS) 5 MG TABS tablet Take 1 tablet by mouth 2 times daily, Disp-180 tablet, R-1Normal      insulin glargine (LANTUS) 100 UNIT/ML injection vial Inject 25 Units into the skin 2 times daily, Disp-1 vial, R-3Normal      !! insulin lispro (HUMALOG) 100 UNIT/ML injection vial Inject 0-18 Units into the skin 3 times daily (with meals), Disp-1 vial, R-3Normal      !! insulin lispro (HUMALOG) 100 UNIT/ML injection vial Inject 0-9 Units into the skin nightly, Disp-1 vial, R-3Normal      atorvastatin (LIPITOR) 40 MG tablet Take 1 tablet by mouth daily, Disp-90 tablet, R-3**Patient requests 90 days supply**Normal      nicotine (NICODERM CQ) 21 MG/24HR Place 1 patch onto the skin daily as needed (if patient smokes/requests nicotine replacement therapy), Disp-30 patch, R-3Normal      pantoprazole (PROTONIX) 40 MG tablet TAKE 1 TABLET BY MOUTH EVERY MORNING BEFORE BREAKFAST, Disp-90 tablet, R-5**Patient requests 90 days supply**Normal      diphenhydrAMINE-APAP, sleep, (TYLENOL PM EXTRA STRENGTH)  MG tablet Take 1 tablet by mouth nightly as needed for SleepHistorical Med      glucose monitoring kit (FREESTYLE) monitoring kit DAILY Starting Tue 4/23/2019, Disp-1 kit, R-0, PrintTo check hyperglycemia /fluctating sugars      Insulin Pen Needle 32G X 4 MM MISC DAILY Starting Mon 1/14/2019, Disp-120 each, R-3, Normal       !! - Potential duplicate medications found. Please discuss with provider. ALLERGIES     is allergic to gabapentin. FAMILY HISTORY     He indicated that his mother is alive. He indicated that his father is alive. He indicated that the status of his sister is unknown.  He indicated that the status of his maternal aunt is unknown. He indicated that the status of his maternal uncle is unknown. He indicated that the status of his paternal aunt is unknown. SOCIAL HISTORY       Social History     Tobacco Use    Smoking status: Current Some Day Smoker     Packs/day: 1.00     Years: 30.00     Pack years: 30.00     Types: Cigarettes    Smokeless tobacco: Former User     Types: Chew     Quit date: 1980    Tobacco comment: pt states has cut down a lot. Had 1 cigarette yesterday   Substance Use Topics    Alcohol use: Yes     Alcohol/week: 0.0 standard drinks     Frequency: Never     Comment: 1 beer yesterday, states occ    Drug use: Not Currently     Types: Marijuana     Comment: last marijuana 1 week ago     PHYSICAL EXAM     INITIAL VITALS: /67   Pulse 93   Temp 97.6 °F (36.4 °C) (Oral)   Resp 18   Ht 6' 1\" (1.854 m)   Wt 170 lb (77.1 kg)   SpO2 97%   BMI 22.43 kg/m²    Physical Exam  Constitutional:       Appearance: Normal appearance. HENT:      Right Ear: External ear normal.      Left Ear: External ear normal.      Mouth/Throat:      Mouth: Mucous membranes are moist.      Pharynx: Oropharynx is clear. Eyes:      General:         Right eye: No discharge. Left eye: No discharge. Conjunctiva/sclera: Conjunctivae normal.   Cardiovascular:      Rate and Rhythm: Normal rate and regular rhythm. Pulmonary:      Effort: Pulmonary effort is normal.      Breath sounds: Normal breath sounds. Abdominal:      Palpations: Abdomen is soft. Tenderness: There is no abdominal tenderness. Musculoskeletal: Normal range of motion. General: No swelling or tenderness. Right lower leg: No edema. Left lower leg: No edema. Skin:     General: Skin is warm and dry. Findings: No erythema. Neurological:      General: No focal deficit present. Mental Status: He is alert and oriented to person, place, and time. Cranial Nerves: No cranial nerve deficit. Coordination: Coordination normal.   Psychiatric:         Mood and Affect: Mood normal.         Thought Content: Thought content normal.         DIAGNOSTIC RESULTS     RADIOLOGY:All plain film, CT, MRI, and formal ultrasound images (except ED bedside ultrasound) are read by the radiologist, see reports below, unless otherwise noted in MDM or here. Interpretation per the Radiologist below, if available at the time of this note:    XR CHEST PORTABLE   Final Result   1. Small left pleural effusion   2.  Scar versus atelectasis, left lung base             LABS:  Labs Reviewed   TROP/MYOGLOBIN - Abnormal; Notable for the following components:       Result Value    Troponin, High Sensitivity 27 (*)     All other components within normal limits   TROP/MYOGLOBIN - Abnormal; Notable for the following components:    Troponin, High Sensitivity 26 (*)     All other components within normal limits   CBC WITH AUTO DIFFERENTIAL - Abnormal; Notable for the following components:    WBC 12.9 (*)     Seg Neutrophils 73 (*)     Lymphocytes 16 (*)     Immature Granulocytes 1 (*)     Segs Absolute 9.61 (*)     All other components within normal limits   BASIC METABOLIC PANEL - Abnormal; Notable for the following components:    Glucose 539 (*)     Bun/Cre Ratio 24 (*)     Sodium 131 (*)     Potassium 5.4 (*)     Chloride 90 (*)     All other components within normal limits   POC GLUCOSE FINGERSTICK - Abnormal; Notable for the following components:    POC Glucose 472 (*)     All other components within normal limits   POC GLUCOSE FINGERSTICK - Abnormal; Notable for the following components:    POC Glucose 443 (*)     All other components within normal limits   POC GLUCOSE FINGERSTICK - Abnormal; Notable for the following components:    POC Glucose 313 (*)     All other components within normal limits   POCT GLUCOSE - Normal   D-DIMER, QUANTITATIVE       All other labs were within normal range or not returned as of this dictation. EMERGENCY DEPARTMENT COURSE and DIFFERENTIAL DIAGNOSIS/MDM:   Vitals:    Vitals:    06/29/20 1951   BP: 124/67   Pulse: 93   Resp: 18   Temp: 97.6 °F (36.4 °C)   TempSrc: Oral   SpO2: 97%   Weight: 170 lb (77.1 kg)   Height: 6' 1\" (1.854 m)       Medical Decision Making: 5year-old male who is afebrile does not appear ill. No distress. He does not have any redness or swelling to the extremities. He has a negative d-dimer. Blood work is unremarkable aside from blood sugar over 500. He received 7 units of insulin his sugar is now down into the 300s. He will be discharged home with a prescription for pain medication. He was educated on the importance of calling his PCP tomorrow regarding medications that he is no longer taking. The patient was given the following meds in the ED:  Orders Placed This Encounter   Medications    morphine (PF) injection 2 mg    insulin regular (HUMULIN R;NOVOLIN R) injection 7 Units    ketorolac (TORADOL) injection 15 mg    HYDROcodone-acetaminophen (NORCO) 5-325 MG per tablet     Sig: Take 1 tablet by mouth every 6 hours as needed for Pain for up to 7 days. Dispense:  20 tablet     Refill:  0       FINAL IMPRESSION      1. Pain of lower extremity, unspecified laterality    2. Chest pain, unspecified type    3. Elevated blood sugar          DISPOSITION/PLAN   DISPOSITION Decision To Discharge 06/29/2020 11:50:10 PM      PATIENT REFERRED TO:   DO Rasheeda Hollis [address] [place] [address]  [phone]    Schedule an appointment as soon as possible for a visit         DISCHARGE MEDICATIONS:     Discharge Medication List as of 6/29/2020 11:51 PM      START taking these medications    Details   HYDROcodone-acetaminophen (NORCO) 5-325 MG per tablet Take 1 tablet by mouth every 6 hours as needed for Pain for up to 7 days. , Disp-20 tablet, R-0Print             CRITICAL CARE TIME       Please note that portions of this note were completed with a voice recognition program.      Kortney Ortiz, APRN - CNP           Ky Louis, APRN - CNP  06/30/20 0014

## 2020-06-30 NOTE — ED NOTES
RN into review discharge instructions to patient. Pt expresses concern with pain medication at discharge. Pt reports \"I told her give me whatever Dr. Mikey Plascencia gives me! \"  Darcie Gallo NP to bedside - Pt states \"I want oxycodone. \"   Darcie Gallo, NP states she reviewed previous d/c instructions at that was the medication prescribed. Pt states \"I told her she can keep the script, but I guess I will take it. \"  Pt observed placing prosthetic leg on standing at bedside. Pt requests turkey sandwich, RN provides turkey sandwich box. Pt gait steady upon discharge.         Pauletta Eisenmenger, RN  06/30/20 [id number]

## 2020-07-01 ENCOUNTER — CARE COORDINATION (OUTPATIENT)
Dept: CARE COORDINATION | Age: 65
End: 2020-07-01

## 2020-07-01 NOTE — CARE COORDINATION
Patient contacted regarding recent discharge and COVID-19 risk. Discussed COVID-19 related testing which was not done at this time. Test results were not done. Patient informed of results, if available? N/A    Spoke to patient, continues to have bilateral leg pain. No new or worsening symptoms. Reports, plans to have meds filled today. Patient, states he's hanging up and ends call abruptly      Care Transition Nurse/ Ambulatory Care Manager contacted the patient by telephone to perform post discharge assessment. Verified name and  with patient as identifiers. Patient has following risk factors of: COPD and diabetes. CTN/ACM reviewed discharge instructions, medical action plan and red flags related to discharge diagnosis. Reviewed and educated them on any new and changed medications related to discharge diagnosis. Advised obtaining a 90-day supply of all daily and as-needed medications. Education provided regarding infection prevention, and signs and symptoms of COVID-19 and when to seek medical attention with patient who verbalized understanding. Discussed exposure protocols and quarantine from [address] you at higher risk for severe illness  and given an opportunity for questions and concerns. The patient agrees to contact the COVID-19 hotline [phone] or PCP office for questions related to their healthcare. CTN/ACM provided contact information for future reference. From CDC: Are you at higher risk for severe illness?  Wash your hands often.  Avoid close contact (6 feet, which is about two arm lengths) with people who are sick.  Put distance between yourself and other people if COVID-19 is spreading in your community.  Clean and disinfect frequently touched surfaces.  Avoid all cruise travel and non-essential air travel.  Call your healthcare professional if you have concerns about COVID-19 and your underlying condition or if you are sick.     For more information on steps you can take to protect yourself, see CDC's How to Protect Yourself    Declines Get Well Loop     Plan for follow-up call in 7-14 days based on severity of symptoms and risk factors.

## 2020-07-04 ENCOUNTER — HOSPITAL ENCOUNTER (EMERGENCY)
Age: 65
Discharge: HOME | End: 2020-07-05
Attending: EMERGENCY MEDICINE
Payer: MEDICARE

## 2020-07-04 ENCOUNTER — APPOINTMENT (OUTPATIENT)
Dept: GENERAL RADIOLOGY | Age: 65
End: 2020-07-04
Payer: MEDICARE

## 2020-07-04 VITALS
RESPIRATION RATE: 18 BRPM | HEIGHT: 72 IN | SYSTOLIC BLOOD PRESSURE: 138 MMHG | WEIGHT: 170 LBS | HEART RATE: 101 BPM | BODY MASS INDEX: 23.03 KG/M2 | OXYGEN SATURATION: 93 % | TEMPERATURE: 98.1 F | DIASTOLIC BLOOD PRESSURE: 72 MMHG

## 2020-07-04 LAB
ABSOLUTE EOS #: 0.15 K/UL (ref 0–0.44)
ABSOLUTE IMMATURE GRANULOCYTE: 0.11 K/UL (ref 0–0.3)
ABSOLUTE LYMPH #: 1.92 K/UL (ref 1.1–3.7)
ABSOLUTE MONO #: 0.74 K/UL (ref 0.1–1.2)
ANION GAP SERPL CALCULATED.3IONS-SCNC: 13 MMOL/L (ref 9–17)
BASOPHILS # BLD: 1 % (ref 0–2)
BASOPHILS ABSOLUTE: 0.07 K/UL (ref 0–0.2)
BETA-HYDROXYBUTYRATE: 0.47 MMOL/L (ref 0.02–0.27)
BUN BLDV-MCNC: 24 MG/DL (ref 8–23)
BUN/CREAT BLD: 24 (ref 9–20)
CALCIUM SERPL-MCNC: 9.6 MG/DL (ref 8.6–10.4)
CHLORIDE BLD-SCNC: 91 MMOL/L (ref 98–107)
CO2: 28 MMOL/L (ref 20–31)
CREAT SERPL-MCNC: 1.01 MG/DL (ref 0.7–1.2)
DIFFERENTIAL TYPE: ABNORMAL
EOSINOPHILS RELATIVE PERCENT: 1 % (ref 1–4)
FIO2: 21
GFR AFRICAN AMERICAN: >60 ML/MIN
GFR NON-AFRICAN AMERICAN: >60 ML/MIN
GFR SERPL CREATININE-BSD FRML MDRD: ABNORMAL ML/MIN/{1.73_M2}
GFR SERPL CREATININE-BSD FRML MDRD: ABNORMAL ML/MIN/{1.73_M2}
GLUCOSE BLD-MCNC: 514 MG/DL (ref 75–110)
GLUCOSE BLD-MCNC: 522 MG/DL (ref 70–99)
HCO3 VENOUS: 29.5 MMOL/L (ref 24–30)
HCT VFR BLD CALC: 43.9 % (ref 40.7–50.3)
HEMOGLOBIN: 13.7 G/DL (ref 13–17)
IMMATURE GRANULOCYTES: 1 %
LYMPHOCYTES # BLD: 18 % (ref 24–43)
MCH RBC QN AUTO: 30 PG (ref 25.2–33.5)
MCHC RBC AUTO-ENTMCNC: 31.2 G/DL (ref 28.4–34.8)
MCV RBC AUTO: 96.1 FL (ref 82.6–102.9)
MONOCYTES # BLD: 7 % (ref 3–12)
NEGATIVE BASE EXCESS, VEN: ABNORMAL (ref 0–2)
NRBC AUTOMATED: 0 PER 100 WBC
O2 DEVICE/FLOW/%: ABNORMAL
O2 SAT, VEN: 64 %
PATIENT TEMP: ABNORMAL
PCO2, VEN: 46 MM HG (ref 39–55)
PDW BLD-RTO: 13.1 % (ref 11.8–14.4)
PH VENOUS: 7.42 (ref 7.32–7.42)
PLATELET # BLD: 430 K/UL (ref 138–453)
PLATELET ESTIMATE: ABNORMAL
PMV BLD AUTO: 9.7 FL (ref 8.1–13.5)
PO2, VEN: 33 MM HG (ref 30–50)
POC PCO2 TEMP: ABNORMAL MM HG
POC PH TEMP: ABNORMAL
POC PO2 TEMP: ABNORMAL MM HG
POSITIVE BASE EXCESS, VEN: 5 (ref 0–2)
POTASSIUM SERPL-SCNC: 5.1 MMOL/L (ref 3.7–5.3)
RBC # BLD: 4.57 M/UL (ref 4.21–5.77)
RBC # BLD: ABNORMAL 10*6/UL
SEG NEUTROPHILS: 72 % (ref 36–65)
SEGMENTED NEUTROPHILS ABSOLUTE COUNT: 7.73 K/UL (ref 1.5–8.1)
SODIUM BLD-SCNC: 132 MMOL/L (ref 135–144)
TOTAL CO2, VENOUS: 31 MMOL/L (ref 25–31)
WBC # BLD: 10.7 K/UL (ref 3.5–11.3)
WBC # BLD: ABNORMAL 10*3/UL

## 2020-07-04 PROCEDURE — 82805 BLOOD GASES W/O2 SATURATION: CPT

## 2020-07-04 PROCEDURE — 80048 BASIC METABOLIC PNL TOTAL CA: CPT

## 2020-07-04 PROCEDURE — 36415 COLL VENOUS BLD VENIPUNCTURE: CPT

## 2020-07-04 PROCEDURE — 96374 THER/PROPH/DIAG INJ IV PUSH: CPT

## 2020-07-04 PROCEDURE — 82010 KETONE BODYS QUAN: CPT

## 2020-07-04 PROCEDURE — 82803 BLOOD GASES ANY COMBINATION: CPT

## 2020-07-04 PROCEDURE — 71045 X-RAY EXAM CHEST 1 VIEW: CPT

## 2020-07-04 PROCEDURE — 99283 EMERGENCY DEPT VISIT LOW MDM: CPT

## 2020-07-04 PROCEDURE — 6370000000 HC RX 637 (ALT 250 FOR IP): Performed by: NURSE PRACTITIONER

## 2020-07-04 PROCEDURE — 2580000003 HC RX 258: Performed by: NURSE PRACTITIONER

## 2020-07-04 PROCEDURE — 6360000002 HC RX W HCPCS: Performed by: NURSE PRACTITIONER

## 2020-07-04 PROCEDURE — 96375 TX/PRO/DX INJ NEW DRUG ADDON: CPT

## 2020-07-04 PROCEDURE — 82947 ASSAY GLUCOSE BLOOD QUANT: CPT

## 2020-07-04 PROCEDURE — 85025 COMPLETE CBC W/AUTO DIFF WBC: CPT

## 2020-07-04 RX ORDER — MORPHINE SULFATE 4 MG/ML
4 INJECTION, SOLUTION INTRAMUSCULAR; INTRAVENOUS ONCE
Status: DISCONTINUED | OUTPATIENT
Start: 2020-07-04 | End: 2020-07-04

## 2020-07-04 RX ORDER — ONDANSETRON 2 MG/ML
4 INJECTION INTRAMUSCULAR; INTRAVENOUS ONCE
Status: COMPLETED | OUTPATIENT
Start: 2020-07-04 | End: 2020-07-04

## 2020-07-04 RX ORDER — MORPHINE SULFATE 4 MG/ML
4 INJECTION, SOLUTION INTRAMUSCULAR; INTRAVENOUS ONCE
Status: COMPLETED | OUTPATIENT
Start: 2020-07-04 | End: 2020-07-04

## 2020-07-04 RX ORDER — 0.9 % SODIUM CHLORIDE 0.9 %
1000 INTRAVENOUS SOLUTION INTRAVENOUS ONCE
Status: COMPLETED | OUTPATIENT
Start: 2020-07-04 | End: 2020-07-05

## 2020-07-04 RX ADMIN — INSULIN HUMAN 7 UNITS: 100 INJECTION, SOLUTION PARENTERAL at 23:52

## 2020-07-04 RX ADMIN — SODIUM CHLORIDE 1000 ML: 9 INJECTION, SOLUTION INTRAVENOUS at 23:52

## 2020-07-04 RX ADMIN — MORPHINE SULFATE 4 MG: 4 INJECTION, SOLUTION INTRAMUSCULAR; INTRAVENOUS at 23:52

## 2020-07-04 RX ADMIN — ONDANSETRON HYDROCHLORIDE 4 MG: 2 SOLUTION INTRAMUSCULAR; INTRAVENOUS at 23:52

## 2020-07-04 ASSESSMENT — ENCOUNTER SYMPTOMS
COUGH: 1
SHORTNESS OF BREATH: 0

## 2020-07-04 ASSESSMENT — PAIN SCALES - GENERAL: PAINLEVEL_OUTOF10: 7

## 2020-07-05 VITALS
HEIGHT: 69 IN | SYSTOLIC BLOOD PRESSURE: 144 MMHG | HEART RATE: 98 BPM | RESPIRATION RATE: 18 BRPM | TEMPERATURE: 97.3 F | WEIGHT: 168 LBS | BODY MASS INDEX: 24.88 KG/M2 | OXYGEN SATURATION: 98 % | DIASTOLIC BLOOD PRESSURE: 91 MMHG

## 2020-07-05 LAB
CHP ED QC CHECK: NORMAL
GLUCOSE BLD-MCNC: 360 MG/DL
GLUCOSE BLD-MCNC: 373 MG/DL (ref 75–110)

## 2020-07-05 PROCEDURE — 99283 EMERGENCY DEPT VISIT LOW MDM: CPT

## 2020-07-05 PROCEDURE — 6370000000 HC RX 637 (ALT 250 FOR IP): Performed by: NURSE PRACTITIONER

## 2020-07-05 PROCEDURE — 82947 ASSAY GLUCOSE BLOOD QUANT: CPT

## 2020-07-05 PROCEDURE — 96372 THER/PROPH/DIAG INJ SC/IM: CPT

## 2020-07-05 RX ORDER — ACETAMINOPHEN 325 MG/1
650 TABLET ORAL ONCE
Status: COMPLETED | OUTPATIENT
Start: 2020-07-05 | End: 2020-07-05

## 2020-07-05 RX ORDER — ONDANSETRON 4 MG/1
4 TABLET, ORALLY DISINTEGRATING ORAL ONCE
Status: COMPLETED | OUTPATIENT
Start: 2020-07-05 | End: 2020-07-05

## 2020-07-05 RX ADMIN — INSULIN HUMAN 4 UNITS: 100 INJECTION, SOLUTION PARENTERAL at 12:10

## 2020-07-05 RX ADMIN — ONDANSETRON 4 MG: 4 TABLET, ORALLY DISINTEGRATING ORAL at 12:59

## 2020-07-05 RX ADMIN — ACETAMINOPHEN 650 MG: 325 TABLET ORAL at 12:59

## 2020-07-05 ASSESSMENT — ENCOUNTER SYMPTOMS
COUGH: 1
RHINORRHEA: 0
NAUSEA: 1
ABDOMINAL PAIN: 0
DIARRHEA: 0
BACK PAIN: 0
VOMITING: 0
SORE THROAT: 0
SHORTNESS OF BREATH: 0
COLOR CHANGE: 0

## 2020-07-05 ASSESSMENT — PAIN SCALES - GENERAL
PAINLEVEL_OUTOF10: 6
PAINLEVEL_OUTOF10: 6

## 2020-07-05 ASSESSMENT — PAIN DESCRIPTION - PAIN TYPE: TYPE: ACUTE PAIN
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
## 2020-07-05 NOTE — ED NOTES
Patient presents to ED with N/V, aches, thirst and feeling run down. Patient was also concerned about his blood sugar.      Ramirez Henriquez RN  07/05/20 6157
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
## 2020-07-05 NOTE — ED PROVIDER NOTES
The patient was seen and examined by me in conjunction with the mid-level provider. I agree with his/her assessment and treatment plan. I do not clinically suspect pneumonia. Blood sugar was elevated and there is no evidence of DKA.      Be Kwok MD  07/05/20 6260

## 2020-07-06 ENCOUNTER — CARE COORDINATION (OUTPATIENT)
Dept: CARE COORDINATION | Age: 65
End: 2020-07-06

## 2020-07-06 ENCOUNTER — OFFICE VISIT (OUTPATIENT)
Dept: FAMILY MEDICINE CLINIC | Age: 65
End: 2020-07-06
Payer: COMMERCIAL

## 2020-07-06 ENCOUNTER — HOSPITAL ENCOUNTER (EMERGENCY)
Age: 65
Discharge: LEFT BEFORE DISCHARGE | End: 2020-07-06
Attending: EMERGENCY MEDICINE
Payer: MEDICARE

## 2020-07-06 ENCOUNTER — TELEPHONE (OUTPATIENT)
Dept: FAMILY MEDICINE CLINIC | Age: 65
End: 2020-07-06

## 2020-07-06 VITALS — TEMPERATURE: 97.7 F | HEART RATE: 96 BPM | OXYGEN SATURATION: 95 % | BODY MASS INDEX: 24.81 KG/M2 | HEIGHT: 69 IN

## 2020-07-06 VITALS
WEIGHT: 168 LBS | RESPIRATION RATE: 20 BRPM | HEART RATE: 82 BPM | DIASTOLIC BLOOD PRESSURE: 44 MMHG | TEMPERATURE: 98.1 F | SYSTOLIC BLOOD PRESSURE: 136 MMHG | OXYGEN SATURATION: 97 % | HEIGHT: 69 IN | BODY MASS INDEX: 24.88 KG/M2

## 2020-07-06 LAB
CHP ED QC CHECK: NORMAL
CHP ED QC CHECK: NORMAL
GLUCOSE BLD-MCNC: 360 MG/DL (ref 75–110)
GLUCOSE BLD-MCNC: 428 MG/DL
GLUCOSE BLD-MCNC: 454 MG/DL
GLUCOSE BLD-MCNC: 456 MG/DL (ref 75–110)

## 2020-07-06 PROCEDURE — 99213 OFFICE O/P EST LOW 20 MIN: CPT | Performed by: INTERNAL MEDICINE

## 2020-07-06 PROCEDURE — 99284 EMERGENCY DEPT VISIT MOD MDM: CPT

## 2020-07-06 PROCEDURE — 82947 ASSAY GLUCOSE BLOOD QUANT: CPT

## 2020-07-06 PROCEDURE — 82962 GLUCOSE BLOOD TEST: CPT | Performed by: INTERNAL MEDICINE

## 2020-07-06 PROCEDURE — 82962 GLUCOSE BLOOD TEST: CPT | Performed by: NURSE PRACTITIONER

## 2020-07-06 RX ORDER — 0.9 % SODIUM CHLORIDE 0.9 %
1000 INTRAVENOUS SOLUTION INTRAVENOUS ONCE
Status: DISCONTINUED | OUTPATIENT
Start: 2020-07-06 | End: 2020-07-06 | Stop reason: HOSPADM

## 2020-07-06 RX ORDER — ASPIRIN 81 MG/1
TABLET, CHEWABLE ORAL
Status: ON HOLD | COMMUNITY
End: 2020-08-06 | Stop reason: HOSPADM

## 2020-07-06 ASSESSMENT — PAIN DESCRIPTION - LOCATION: LOCATION: LEG

## 2020-07-06 ASSESSMENT — ENCOUNTER SYMPTOMS
VISUAL CHANGE: 1
DIARRHEA: 0
SORE THROAT: 0
ABDOMINAL PAIN: 0
EYE PAIN: 0
SHORTNESS OF BREATH: 0
CHEST TIGHTNESS: 0
NAUSEA: 1
ANAL BLEEDING: 0
FACIAL SWELLING: 0
VOMITING: 1
CONSTIPATION: 0
ABDOMINAL PAIN: 0
SHORTNESS OF BREATH: 0
BACK PAIN: 0
BLURRED VISION: 1
RECTAL PAIN: 0
SWOLLEN GLANDS: 0
COUGH: 0
EYE DISCHARGE: 0
CHANGE IN BOWEL HABIT: 0
WHEEZING: 0
BLOOD IN STOOL: 0
ABDOMINAL DISTENTION: 0
STRIDOR: 0

## 2020-07-06 ASSESSMENT — PAIN SCALES - GENERAL: PAINLEVEL_OUTOF10: 8

## 2020-07-06 ASSESSMENT — PAIN DESCRIPTION - PAIN TYPE: TYPE: CHRONIC PAIN

## 2020-07-06 NOTE — CARE COORDINATION
1st outreach for ED follow up / COVID risk monitoring. Unable to contact.  Voice mailbox is full and unable to leave a message
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
## 2020-07-06 NOTE — TELEPHONE ENCOUNTER
Patient's daughter Esperanza called wanting to speak to you personally about patient's health.  Her phone number is [phone]
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
## 2020-07-08 LAB
AVERAGE GLUCOSE: NORMAL
HBA1C MFR BLD: 13 %

## 2020-07-10 ENCOUNTER — TELEPHONE (OUTPATIENT)
Dept: FAMILY MEDICINE CLINIC | Age: 65
End: 2020-07-10

## 2020-07-10 NOTE — TELEPHONE ENCOUNTER
Patient was discharged from er last night, says there was something wrong with his WBC/it was high? Also he is asking for a new glucose meter sent to [facility]. Please advise.

## 2020-07-10 NOTE — TELEPHONE ENCOUNTER
Called patient. Pt was discharged last night and pt stated he needs a glucose meter and that he broke his other one. Pt stated that he declines home care and does not want anyone coming to his house. Pt stated that he will be out of insulin by Monday. Please advise what pt should do. Pt would like a cheaper insulin. Pt states he can take care of himself. I advised who was going to monitor his foot and he stated he was going to follow up with you.

## 2020-07-13 ENCOUNTER — HOSPITAL ENCOUNTER (EMERGENCY)
Age: 65
Discharge: HOME | End: 2020-07-13
Attending: EMERGENCY MEDICINE
Payer: MEDICARE

## 2020-07-13 VITALS
HEART RATE: 78 BPM | RESPIRATION RATE: 16 BRPM | BODY MASS INDEX: 22.53 KG/M2 | SYSTOLIC BLOOD PRESSURE: 146 MMHG | DIASTOLIC BLOOD PRESSURE: 60 MMHG | HEIGHT: 73 IN | TEMPERATURE: 98 F | OXYGEN SATURATION: 97 % | WEIGHT: 170 LBS

## 2020-07-13 LAB
ABSOLUTE EOS #: 0.15 K/UL (ref 0–0.44)
ABSOLUTE IMMATURE GRANULOCYTE: 0.04 K/UL (ref 0–0.3)
ABSOLUTE LYMPH #: 1.97 K/UL (ref 1.1–3.7)
ABSOLUTE MONO #: 0.94 K/UL (ref 0.1–1.2)
ANION GAP SERPL CALCULATED.3IONS-SCNC: 10 MMOL/L (ref 9–17)
BASOPHILS # BLD: 1 % (ref 0–2)
BASOPHILS ABSOLUTE: 0.07 K/UL (ref 0–0.2)
BUN BLDV-MCNC: 19 MG/DL (ref 8–23)
BUN/CREAT BLD: 24 (ref 9–20)
CALCIUM SERPL-MCNC: 9.3 MG/DL (ref 8.6–10.4)
CHLORIDE BLD-SCNC: 101 MMOL/L (ref 98–107)
CHP ED QC CHECK: NORMAL
CO2: 27 MMOL/L (ref 20–31)
CREAT SERPL-MCNC: 0.79 MG/DL (ref 0.7–1.2)
DIFFERENTIAL TYPE: ABNORMAL
EOSINOPHILS RELATIVE PERCENT: 1 % (ref 1–4)
GFR AFRICAN AMERICAN: >60 ML/MIN
GFR NON-AFRICAN AMERICAN: >60 ML/MIN
GFR SERPL CREATININE-BSD FRML MDRD: ABNORMAL ML/MIN/{1.73_M2}
GFR SERPL CREATININE-BSD FRML MDRD: ABNORMAL ML/MIN/{1.73_M2}
GLUCOSE BLD-MCNC: 326 MG/DL
GLUCOSE BLD-MCNC: 326 MG/DL (ref 75–110)
GLUCOSE BLD-MCNC: 365 MG/DL (ref 70–99)
HCT VFR BLD CALC: 41 % (ref 40.7–50.3)
HEMOGLOBIN: 12.5 G/DL (ref 13–17)
IMMATURE GRANULOCYTES: 0 %
LYMPHOCYTES # BLD: 15 % (ref 24–43)
MCH RBC QN AUTO: 29.6 PG (ref 25.2–33.5)
MCHC RBC AUTO-ENTMCNC: 30.5 G/DL (ref 28–38)
MCV RBC AUTO: 96.9 FL (ref 82.6–102.9)
MONOCYTES # BLD: 7 % (ref 3–12)
NRBC AUTOMATED: ABNORMAL PER 100 WBC
PDW BLD-RTO: 13.2 % (ref 11.8–14.4)
PLATELET # BLD: 447 K/UL (ref 138–453)
PLATELET ESTIMATE: ABNORMAL
PMV BLD AUTO: 9.5 FL (ref 8.1–13.5)
POTASSIUM SERPL-SCNC: 5.4 MMOL/L (ref 3.7–5.3)
RBC # BLD: 4.23 M/UL (ref 4.21–5.77)
RBC # BLD: ABNORMAL 10*6/UL
SEG NEUTROPHILS: 76 % (ref 36–65)
SEGMENTED NEUTROPHILS ABSOLUTE COUNT: 9.76 K/UL (ref 1.5–8.1)
SODIUM BLD-SCNC: 138 MMOL/L (ref 135–144)
WBC # BLD: 12.9 K/UL (ref 3.5–11.3)
WBC # BLD: ABNORMAL 10*3/UL

## 2020-07-13 PROCEDURE — 85025 COMPLETE CBC W/AUTO DIFF WBC: CPT

## 2020-07-13 PROCEDURE — 80048 BASIC METABOLIC PNL TOTAL CA: CPT

## 2020-07-13 PROCEDURE — 36415 COLL VENOUS BLD VENIPUNCTURE: CPT

## 2020-07-13 PROCEDURE — 96372 THER/PROPH/DIAG INJ SC/IM: CPT

## 2020-07-13 PROCEDURE — 6370000000 HC RX 637 (ALT 250 FOR IP): Performed by: EMERGENCY MEDICINE

## 2020-07-13 PROCEDURE — 82947 ASSAY GLUCOSE BLOOD QUANT: CPT

## 2020-07-13 PROCEDURE — 99283 EMERGENCY DEPT VISIT LOW MDM: CPT

## 2020-07-13 RX ORDER — BLOOD-GLUCOSE METER
1 KIT MISCELLANEOUS DAILY
Qty: 1 KIT | Refills: 0 | Status: ON HOLD | OUTPATIENT
Start: 2020-07-13 | End: 2020-09-02 | Stop reason: SDUPTHER

## 2020-07-13 RX ORDER — HYDROCODONE BITARTRATE AND ACETAMINOPHEN 5; 325 MG/1; MG/1
1 TABLET ORAL EVERY 4 HOURS PRN
Qty: 8 TABLET | Refills: 0 | Status: SHIPPED | OUTPATIENT
Start: 2020-07-13 | End: 2020-07-16

## 2020-07-13 RX ORDER — LANCETS 30 GAUGE
1 EACH MISCELLANEOUS 2 TIMES DAILY
Qty: 300 EACH | Refills: 1 | Status: ON HOLD | OUTPATIENT
Start: 2020-07-13 | End: 2021-01-11 | Stop reason: HOSPADM

## 2020-07-13 RX ORDER — DOXYCYCLINE HYCLATE 100 MG
100 TABLET ORAL 2 TIMES DAILY
Qty: 20 TABLET | Refills: 0 | Status: SHIPPED | OUTPATIENT
Start: 2020-07-13 | End: 2020-07-23

## 2020-07-13 RX ORDER — OXYCODONE HYDROCHLORIDE AND ACETAMINOPHEN 5; 325 MG/1; MG/1
1 TABLET ORAL ONCE
Status: COMPLETED | OUTPATIENT
Start: 2020-07-13 | End: 2020-07-13

## 2020-07-13 RX ADMIN — INSULIN LISPRO 15 UNITS: 100 INJECTION, SOLUTION INTRAVENOUS; SUBCUTANEOUS at 01:30

## 2020-07-13 RX ADMIN — OXYCODONE HYDROCHLORIDE AND ACETAMINOPHEN 1 TABLET: 5; 325 TABLET ORAL at 01:29

## 2020-07-13 ASSESSMENT — PAIN DESCRIPTION - PAIN TYPE: TYPE: ACUTE PAIN

## 2020-07-13 ASSESSMENT — PAIN DESCRIPTION - DESCRIPTORS: DESCRIPTORS: PINS AND NEEDLES

## 2020-07-13 ASSESSMENT — PAIN SCALES - GENERAL
PAINLEVEL_OUTOF10: 8
PAINLEVEL_OUTOF10: 10

## 2020-07-13 ASSESSMENT — PAIN DESCRIPTION - LOCATION: LOCATION: LEG

## 2020-07-13 ASSESSMENT — PAIN DESCRIPTION - FREQUENCY: FREQUENCY: CONTINUOUS

## 2020-07-13 ASSESSMENT — PAIN DESCRIPTION - ONSET: ONSET: ON-GOING

## 2020-07-13 ASSESSMENT — PAIN DESCRIPTION - PROGRESSION: CLINICAL_PROGRESSION: NOT CHANGED

## 2020-07-13 ASSESSMENT — PAIN DESCRIPTION - ORIENTATION: ORIENTATION: LEFT

## 2020-07-13 NOTE — ED NOTES
Pt arrived to ED via private auto with c/o chronic leg and foot pain. Pt seen here and other  hospitals the past few days for same complaint. Pt able to ambulate without assist. Rates pain 8/10. Pt has right leg amputated below knee. Pt states he has an appointment with his doctor on Monday.             Tressa Duffy RN  07/13/20 [id number]

## 2020-07-13 NOTE — ED NOTES
Patient education flyer \"[facility] What [facility] to [facility]\" was provided and reviewed. Questions answered and understanding was verbalized by the patient and/or family.         Anastasiya Vizcarra RN  07/13/20 [id number]

## 2020-07-13 NOTE — TELEPHONE ENCOUNTER
Okay I sent in a new meter and testing supplies   Should follow up soon for hospital f/u and he can come and get samples ASAP if we have any lantus , levermir , tresiba  He can also get novolin vials at [facility] without a prescription that typically cost about 25 dollars out of pocket
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
## 2020-07-14 ENCOUNTER — CARE COORDINATION (OUTPATIENT)
Dept: CARE COORDINATION | Age: 65
End: 2020-07-14

## 2020-07-14 NOTE — CARE COORDINATION
Patient contacted regarding recent discharge and COVID-19 risk. Discussed COVID-19 related testing which was not done at this time. Test results were not done. Patient informed of results, if available? NA    Spoke to patient, reports he's feeling ok, will not elaborate further. Reports, he has to  his glucometer from the pharmacy. Reports, he doesn't always take his meds and often forgets. Reports, he has upcoming provider appointments, but unsure when and that his paperwork is in his truck   [facility] offered assistance scheduling PCP and provider appointments, he declined. Stated he'd call them later      Care Transition Nurse/ Ambulatory Care Manager contacted the patient by telephone to perform post discharge assessment. Verified name and  with patient as identifiers. Patient has following risk factors of: COPD and diabetes. CTN/ACM reviewed discharge instructions, medical action plan and red flags related to discharge diagnosis. Reviewed and educated them on any new and changed medications related to discharge diagnosis. Advised obtaining a 90-day supply of all daily and as-needed medications. Education provided regarding infection prevention, and signs and symptoms of COVID-19 and when to seek medical attention with patient who verbalized understanding. Discussed exposure protocols and quarantine from [address] you at higher risk for severe illness 2019 and given an opportunity for questions and concerns. The patient agrees to contact the COVID-19 hotline [phone] or PCP office for questions related to their healthcare. CTN/ACM provided contact information for future reference. From CDC: Are you at higher risk for severe illness?  Wash your hands often.  Avoid close contact (6 feet, which is about two arm lengths) with people who are sick.  Put distance between yourself and other people if COVID-19 is spreading in your community.    Clean and disinfect frequently touched surfaces.  Avoid all cruise travel and non-essential air travel.  Call your healthcare professional if you have concerns about COVID-19 and your underlying condition or if you are sick. For more information on steps you can take to protect yourself, see CDC's How to [id number] [place] [address] for follow-up call in 7-14 days based on severity of symptoms and risk factors.

## 2020-07-24 ENCOUNTER — APPOINTMENT (OUTPATIENT)
Dept: GENERAL RADIOLOGY | Age: 65
End: 2020-07-24
Payer: MEDICARE

## 2020-07-24 ENCOUNTER — APPOINTMENT (OUTPATIENT)
Dept: CT IMAGING | Age: 65
End: 2020-07-24
Payer: MEDICARE

## 2020-07-24 ENCOUNTER — HOSPITAL ENCOUNTER (EMERGENCY)
Age: 65
Discharge: HOME | End: 2020-07-24
Attending: EMERGENCY MEDICINE
Payer: MEDICARE

## 2020-07-24 VITALS
DIASTOLIC BLOOD PRESSURE: 66 MMHG | OXYGEN SATURATION: 92 % | WEIGHT: 170 LBS | HEART RATE: 90 BPM | TEMPERATURE: 98.6 F | RESPIRATION RATE: 29 BRPM | HEIGHT: 73 IN | SYSTOLIC BLOOD PRESSURE: 127 MMHG | BODY MASS INDEX: 22.53 KG/M2

## 2020-07-24 LAB
ABSOLUTE EOS #: 0.08 K/UL (ref 0–0.44)
ABSOLUTE IMMATURE GRANULOCYTE: 0.09 K/UL (ref 0–0.3)
ABSOLUTE LYMPH #: 2.36 K/UL (ref 1.1–3.7)
ABSOLUTE MONO #: 0.82 K/UL (ref 0.1–1.2)
ACETAMINOPHEN LEVEL: <5 UG/ML (ref 10–30)
ANION GAP SERPL CALCULATED.3IONS-SCNC: 19 MMOL/L (ref 9–17)
BASOPHILS # BLD: 0 % (ref 0–2)
BASOPHILS ABSOLUTE: 0.06 K/UL (ref 0–0.2)
BUN BLDV-MCNC: 15 MG/DL (ref 8–23)
BUN/CREAT BLD: ABNORMAL (ref 9–20)
CALCIUM SERPL-MCNC: 8.2 MG/DL (ref 8.6–10.4)
CHLORIDE BLD-SCNC: 92 MMOL/L (ref 98–107)
CO2: 17 MMOL/L (ref 20–31)
CREAT SERPL-MCNC: 0.71 MG/DL (ref 0.7–1.2)
DIFFERENTIAL TYPE: ABNORMAL
EOSINOPHILS RELATIVE PERCENT: 1 % (ref 1–4)
ETHANOL PERCENT: 0.2 %
ETHANOL: 204 MG/DL
GFR AFRICAN AMERICAN: >60 ML/MIN
GFR NON-AFRICAN AMERICAN: >60 ML/MIN
GFR SERPL CREATININE-BSD FRML MDRD: ABNORMAL ML/MIN/{1.73_M2}
GFR SERPL CREATININE-BSD FRML MDRD: ABNORMAL ML/MIN/{1.73_M2}
GLUCOSE BLD-MCNC: 131 MG/DL (ref 70–99)
HCT VFR BLD CALC: 37.9 % (ref 40.7–50.3)
HEMOGLOBIN: 11.8 G/DL (ref 13–17)
IMMATURE GRANULOCYTES: 1 %
LYMPHOCYTES # BLD: 15 % (ref 24–43)
MCH RBC QN AUTO: 29.7 PG (ref 25.2–33.5)
MCHC RBC AUTO-ENTMCNC: 31.1 G/DL (ref 28.4–34.8)
MCV RBC AUTO: 95.5 FL (ref 82.6–102.9)
MONOCYTES # BLD: 5 % (ref 3–12)
NRBC AUTOMATED: 0 PER 100 WBC
PDW BLD-RTO: 12.5 % (ref 11.8–14.4)
PLATELET # BLD: 408 K/UL (ref 138–453)
PLATELET ESTIMATE: ABNORMAL
PMV BLD AUTO: 9.6 FL (ref 8.1–13.5)
POTASSIUM SERPL-SCNC: 3.6 MMOL/L (ref 3.7–5.3)
RBC # BLD: 3.97 M/UL (ref 4.21–5.77)
RBC # BLD: ABNORMAL 10*6/UL
SALICYLATE LEVEL: <1 MG/DL (ref 3–10)
SEG NEUTROPHILS: 78 % (ref 36–65)
SEGMENTED NEUTROPHILS ABSOLUTE COUNT: 11.95 K/UL (ref 1.5–8.1)
SODIUM BLD-SCNC: 128 MMOL/L (ref 135–144)
TOXIC TRICYCLIC SC,BLOOD: NEGATIVE
WBC # BLD: 15.4 K/UL (ref 3.5–11.3)
WBC # BLD: ABNORMAL 10*3/UL

## 2020-07-24 PROCEDURE — 72125 CT NECK SPINE W/O DYE: CPT

## 2020-07-24 PROCEDURE — 80307 DRUG TEST PRSMV CHEM ANLYZR: CPT

## 2020-07-24 PROCEDURE — G0480 DRUG TEST DEF 1-7 CLASSES: HCPCS

## 2020-07-24 PROCEDURE — 99284 EMERGENCY DEPT VISIT MOD MDM: CPT

## 2020-07-24 PROCEDURE — 80048 BASIC METABOLIC PNL TOTAL CA: CPT

## 2020-07-24 PROCEDURE — 73090 X-RAY EXAM OF FOREARM: CPT

## 2020-07-24 PROCEDURE — 85025 COMPLETE CBC W/AUTO DIFF WBC: CPT

## 2020-07-24 PROCEDURE — 70450 CT HEAD/BRAIN W/O DYE: CPT
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
## 2020-07-24 NOTE — ED NOTES
Bed: 15  Expected date: 7/24/20  Expected time: 6:33 PM  Means of arrival:   Comments:  OTTO Franco RN  07/24/20 [id number]

## 2020-07-24 NOTE — ED NOTES
Xray at bedside. Labeled blood specimen sent to lab. Report given to [facility].       Elizabeth Jane RN  07/24/20 2681

## 2020-07-25 NOTE — ED PROVIDER NOTES
personally evaluated and examined the patient in conjunction with the APC and agree with the treatment plan and disposition of the patient as recorded by the APC.     Shawna Razo MD  Attending Emergency  Physician       Sylvia Hardin MD  07/24/20 [id number]

## 2020-07-25 NOTE — ED NOTES
Pt states he wants to leave. Pt rips self off monitor and refusing to let writer place him back on the monitor. MD notified.       Minad Dennis RN  07/24/20 2046

## 2020-07-25 NOTE — ED NOTES
Attempted assessment due to alcohol abuse. Patient appeared alert, disheveled AEB unkempt hair/fingernails, dirty tshirt and slight malodorous scent in room. He alleges to have driven to his friend's home on [address]. He admits to consuming \"2 beers today\", denying chronic alcohol consumption. Please review former ED history of alcohol abuse. Patient resides alone on [address] [place] [address] in [facility]. He denies any social support except his friend, noting his ex wife and adult children reside in [place]. He is unclear what lead him to [facility] a few years ago. Patient reported hx of right BKA a year ago, denying any issues, but unknowing of the reason why it was amputated. Patient drives. He denies need for any type of alcohol treatment. Patient denies illicit drug abuse. He denies further social work needs at this time.      [facility]     [facility] [place] [place]  07/24/20 2014

## 2020-07-25 NOTE — ED NOTES
Pt refuses to wear monitor, continued attempts to get out of bed. States he is not drunk and wants to leave. Pt has been redirected several times. Will continue to monitor.         Brown Marie RN  07/24/20 2124

## 2020-07-27 ENCOUNTER — HOSPITAL ENCOUNTER (INPATIENT)
Age: 65
LOS: 7 days | Discharge: LEFT BEFORE DISCHARGE | DRG: 271 | End: 2020-08-03
Attending: EMERGENCY MEDICINE | Admitting: INTERNAL MEDICINE
Payer: MEDICARE

## 2020-07-27 ENCOUNTER — APPOINTMENT (OUTPATIENT)
Dept: GENERAL RADIOLOGY | Age: 65
DRG: 271 | End: 2020-07-27
Payer: MEDICARE

## 2020-07-27 LAB
ABSOLUTE EOS #: 0.17 K/UL (ref 0–0.44)
ABSOLUTE IMMATURE GRANULOCYTE: 0.08 K/UL (ref 0–0.3)
ABSOLUTE LYMPH #: 1.97 K/UL (ref 1.1–3.7)
ABSOLUTE MONO #: 0.95 K/UL (ref 0.1–1.2)
ANION GAP SERPL CALCULATED.3IONS-SCNC: 10 MMOL/L (ref 9–17)
BASOPHILS # BLD: 1 % (ref 0–2)
BASOPHILS ABSOLUTE: 0.07 K/UL (ref 0–0.2)
BUN BLDV-MCNC: 26 MG/DL (ref 8–23)
BUN/CREAT BLD: 28 (ref 9–20)
C-REACTIVE PROTEIN: 62.4 MG/L (ref 0–5)
CALCIUM SERPL-MCNC: 8.9 MG/DL (ref 8.6–10.4)
CHLORIDE BLD-SCNC: 97 MMOL/L (ref 98–107)
CO2: 27 MMOL/L (ref 20–31)
CREAT SERPL-MCNC: 0.93 MG/DL (ref 0.7–1.2)
DIFFERENTIAL TYPE: ABNORMAL
EOSINOPHILS RELATIVE PERCENT: 1 % (ref 1–4)
GFR AFRICAN AMERICAN: >60 ML/MIN
GFR NON-AFRICAN AMERICAN: >60 ML/MIN
GFR SERPL CREATININE-BSD FRML MDRD: ABNORMAL ML/MIN/{1.73_M2}
GFR SERPL CREATININE-BSD FRML MDRD: ABNORMAL ML/MIN/{1.73_M2}
GLUCOSE BLD-MCNC: 250 MG/DL (ref 75–110)
GLUCOSE BLD-MCNC: 331 MG/DL (ref 70–99)
HCT VFR BLD CALC: 37.6 % (ref 40.7–50.3)
HEMOGLOBIN: 11.3 G/DL (ref 13–17)
IMMATURE GRANULOCYTES: 1 %
LYMPHOCYTES # BLD: 16 % (ref 24–43)
MCH RBC QN AUTO: 29.5 PG (ref 25.2–33.5)
MCHC RBC AUTO-ENTMCNC: 30.1 G/DL (ref 28.4–34.8)
MCV RBC AUTO: 98.2 FL (ref 82.6–102.9)
MONOCYTES # BLD: 8 % (ref 3–12)
NRBC AUTOMATED: 0 PER 100 WBC
PDW BLD-RTO: 12.5 % (ref 11.8–14.4)
PLATELET # BLD: 414 K/UL (ref 138–453)
PLATELET ESTIMATE: ABNORMAL
PMV BLD AUTO: 9.5 FL (ref 8.1–13.5)
POTASSIUM SERPL-SCNC: 4.9 MMOL/L (ref 3.7–5.3)
RBC # BLD: 3.83 M/UL (ref 4.21–5.77)
RBC # BLD: ABNORMAL 10*6/UL
SEDIMENTATION RATE, ERYTHROCYTE: 91 MM (ref 0–20)
SEG NEUTROPHILS: 73 % (ref 36–65)
SEGMENTED NEUTROPHILS ABSOLUTE COUNT: 9.25 K/UL (ref 1.5–8.1)
SODIUM BLD-SCNC: 134 MMOL/L (ref 135–144)
WBC # BLD: 12.5 K/UL (ref 3.5–11.3)
WBC # BLD: ABNORMAL 10*3/UL

## 2020-07-27 PROCEDURE — 85025 COMPLETE CBC W/AUTO DIFF WBC: CPT

## 2020-07-27 PROCEDURE — 85652 RBC SED RATE AUTOMATED: CPT

## 2020-07-27 PROCEDURE — 80048 BASIC METABOLIC PNL TOTAL CA: CPT

## 2020-07-27 PROCEDURE — 6370000000 HC RX 637 (ALT 250 FOR IP): Performed by: EMERGENCY MEDICINE

## 2020-07-27 PROCEDURE — 86140 C-REACTIVE PROTEIN: CPT

## 2020-07-27 PROCEDURE — 73630 X-RAY EXAM OF FOOT: CPT

## 2020-07-27 PROCEDURE — 6370000000 HC RX 637 (ALT 250 FOR IP): Performed by: NURSE PRACTITIONER

## 2020-07-27 PROCEDURE — 99223 1ST HOSP IP/OBS HIGH 75: CPT | Performed by: NURSE PRACTITIONER

## 2020-07-27 PROCEDURE — 2580000003 HC RX 258: Performed by: NURSE PRACTITIONER

## 2020-07-27 PROCEDURE — 2580000003 HC RX 258: Performed by: EMERGENCY MEDICINE

## 2020-07-27 PROCEDURE — 1200000000 HC SEMI PRIVATE

## 2020-07-27 PROCEDURE — 82947 ASSAY GLUCOSE BLOOD QUANT: CPT

## 2020-07-27 PROCEDURE — 6360000002 HC RX W HCPCS: Performed by: EMERGENCY MEDICINE

## 2020-07-27 PROCEDURE — 99284 EMERGENCY DEPT VISIT MOD MDM: CPT

## 2020-07-27 RX ORDER — ACETAMINOPHEN 325 MG/1
650 TABLET ORAL EVERY 6 HOURS PRN
Status: DISCONTINUED | OUTPATIENT
Start: 2020-07-27 | End: 2020-07-29 | Stop reason: SDUPTHER

## 2020-07-27 RX ORDER — OXYCODONE HYDROCHLORIDE AND ACETAMINOPHEN 5; 325 MG/1; MG/1
2 TABLET ORAL EVERY 4 HOURS PRN
Status: DISCONTINUED | OUTPATIENT
Start: 2020-07-27 | End: 2020-07-29 | Stop reason: SDUPTHER

## 2020-07-27 RX ORDER — PANTOPRAZOLE SODIUM 40 MG/1
40 TABLET, DELAYED RELEASE ORAL
Status: DISCONTINUED | OUTPATIENT
Start: 2020-07-28 | End: 2020-07-29 | Stop reason: SDUPTHER

## 2020-07-27 RX ORDER — ASPIRIN 81 MG/1
81 TABLET, CHEWABLE ORAL DAILY
Status: DISCONTINUED | OUTPATIENT
Start: 2020-07-28 | End: 2020-08-03 | Stop reason: HOSPADM

## 2020-07-27 RX ORDER — HYDROCODONE BITARTRATE AND ACETAMINOPHEN 5; 325 MG/1; MG/1
1 TABLET ORAL EVERY 6 HOURS PRN
Status: DISCONTINUED | OUTPATIENT
Start: 2020-07-27 | End: 2020-07-27

## 2020-07-27 RX ORDER — POLYETHYLENE GLYCOL 3350 17 G/17G
17 POWDER, FOR SOLUTION ORAL DAILY PRN
Status: DISCONTINUED | OUTPATIENT
Start: 2020-07-27 | End: 2020-08-03 | Stop reason: HOSPADM

## 2020-07-27 RX ORDER — NICOTINE POLACRILEX 4 MG
15 LOZENGE BUCCAL PRN
Status: DISCONTINUED | OUTPATIENT
Start: 2020-07-27 | End: 2020-08-03 | Stop reason: HOSPADM

## 2020-07-27 RX ORDER — CALCIUM CARBONATE 200(500)MG
1000 TABLET,CHEWABLE ORAL 3 TIMES DAILY PRN
Status: DISCONTINUED | OUTPATIENT
Start: 2020-07-27 | End: 2020-08-03 | Stop reason: HOSPADM

## 2020-07-27 RX ORDER — SODIUM CHLORIDE 0.9 % (FLUSH) 0.9 %
10 SYRINGE (ML) INJECTION EVERY 12 HOURS SCHEDULED
Status: DISCONTINUED | OUTPATIENT
Start: 2020-07-27 | End: 2020-07-29 | Stop reason: SDUPTHER

## 2020-07-27 RX ORDER — OXYCODONE HYDROCHLORIDE AND ACETAMINOPHEN 5; 325 MG/1; MG/1
1 TABLET ORAL ONCE
Status: COMPLETED | OUTPATIENT
Start: 2020-07-27 | End: 2020-07-27

## 2020-07-27 RX ORDER — MAGNESIUM SULFATE 1 G/100ML
1 INJECTION INTRAVENOUS PRN
Status: DISCONTINUED | OUTPATIENT
Start: 2020-07-27 | End: 2020-08-03 | Stop reason: HOSPADM

## 2020-07-27 RX ORDER — PROMETHAZINE HYDROCHLORIDE 12.5 MG/1
12.5 TABLET ORAL EVERY 6 HOURS PRN
Status: DISCONTINUED | OUTPATIENT
Start: 2020-07-27 | End: 2020-08-03 | Stop reason: HOSPADM

## 2020-07-27 RX ORDER — OXYCODONE HYDROCHLORIDE AND ACETAMINOPHEN 5; 325 MG/1; MG/1
1 TABLET ORAL EVERY 4 HOURS PRN
Status: DISCONTINUED | OUTPATIENT
Start: 2020-07-27 | End: 2020-07-29 | Stop reason: SDUPTHER

## 2020-07-27 RX ORDER — DEXTROSE MONOHYDRATE 25 G/50ML
12.5 INJECTION, SOLUTION INTRAVENOUS PRN
Status: DISCONTINUED | OUTPATIENT
Start: 2020-07-27 | End: 2020-08-03 | Stop reason: HOSPADM

## 2020-07-27 RX ORDER — SODIUM CHLORIDE 9 MG/ML
INJECTION, SOLUTION INTRAVENOUS CONTINUOUS
Status: DISCONTINUED | OUTPATIENT
Start: 2020-07-27 | End: 2020-08-01

## 2020-07-27 RX ORDER — DIPHENHYDRAMINE HCL 25 MG
25 TABLET ORAL NIGHTLY PRN
Status: DISCONTINUED | OUTPATIENT
Start: 2020-07-27 | End: 2020-08-03 | Stop reason: HOSPADM

## 2020-07-27 RX ORDER — ACETAMINOPHEN 650 MG/1
650 SUPPOSITORY RECTAL EVERY 6 HOURS PRN
Status: DISCONTINUED | OUTPATIENT
Start: 2020-07-27 | End: 2020-07-29 | Stop reason: SDUPTHER

## 2020-07-27 RX ORDER — IPRATROPIUM BROMIDE AND ALBUTEROL SULFATE 2.5; .5 MG/3ML; MG/3ML
3 SOLUTION RESPIRATORY (INHALATION)
Status: DISCONTINUED | OUTPATIENT
Start: 2020-07-28 | End: 2020-07-29

## 2020-07-27 RX ORDER — NICOTINE 21 MG/24HR
1 PATCH, TRANSDERMAL 24 HOURS TRANSDERMAL DAILY PRN
Status: DISCONTINUED | OUTPATIENT
Start: 2020-07-27 | End: 2020-08-03 | Stop reason: HOSPADM

## 2020-07-27 RX ORDER — DEXTROSE MONOHYDRATE 50 MG/ML
100 INJECTION, SOLUTION INTRAVENOUS PRN
Status: DISCONTINUED | OUTPATIENT
Start: 2020-07-27 | End: 2020-08-03 | Stop reason: HOSPADM

## 2020-07-27 RX ORDER — ONDANSETRON 2 MG/ML
4 INJECTION INTRAMUSCULAR; INTRAVENOUS EVERY 6 HOURS PRN
Status: DISCONTINUED | OUTPATIENT
Start: 2020-07-27 | End: 2020-07-29 | Stop reason: SDUPTHER

## 2020-07-27 RX ORDER — POTASSIUM CHLORIDE 20 MEQ/1
40 TABLET, EXTENDED RELEASE ORAL PRN
Status: DISCONTINUED | OUTPATIENT
Start: 2020-07-27 | End: 2020-08-03 | Stop reason: HOSPADM

## 2020-07-27 RX ORDER — POTASSIUM CHLORIDE 7.45 MG/ML
10 INJECTION INTRAVENOUS PRN
Status: DISCONTINUED | OUTPATIENT
Start: 2020-07-27 | End: 2020-08-03 | Stop reason: HOSPADM

## 2020-07-27 RX ORDER — INSULIN GLARGINE 100 [IU]/ML
25 INJECTION, SOLUTION SUBCUTANEOUS 2 TIMES DAILY
Status: DISCONTINUED | OUTPATIENT
Start: 2020-07-28 | End: 2020-07-30

## 2020-07-27 RX ORDER — INSULIN GLARGINE 100 [IU]/ML
25 INJECTION, SOLUTION SUBCUTANEOUS ONCE
Status: COMPLETED | OUTPATIENT
Start: 2020-07-27 | End: 2020-07-27

## 2020-07-27 RX ORDER — FLUTICASONE PROPIONATE 50 MCG
2 SPRAY, SUSPENSION (ML) NASAL DAILY
Status: DISCONTINUED | OUTPATIENT
Start: 2020-07-28 | End: 2020-08-03 | Stop reason: HOSPADM

## 2020-07-27 RX ORDER — SODIUM CHLORIDE 0.9 % (FLUSH) 0.9 %
10 SYRINGE (ML) INJECTION PRN
Status: DISCONTINUED | OUTPATIENT
Start: 2020-07-27 | End: 2020-07-29 | Stop reason: SDUPTHER

## 2020-07-27 RX ORDER — ATORVASTATIN CALCIUM 40 MG/1
40 TABLET, FILM COATED ORAL NIGHTLY
Status: DISCONTINUED | OUTPATIENT
Start: 2020-07-28 | End: 2020-08-03 | Stop reason: HOSPADM

## 2020-07-27 RX ORDER — MORPHINE SULFATE 4 MG/ML
4 INJECTION, SOLUTION INTRAMUSCULAR; INTRAVENOUS ONCE
Status: COMPLETED | OUTPATIENT
Start: 2020-07-27 | End: 2020-07-27

## 2020-07-27 RX ADMIN — INSULIN GLARGINE 25 UNITS: 100 INJECTION, SOLUTION SUBCUTANEOUS at 22:14

## 2020-07-27 RX ADMIN — SODIUM CHLORIDE: 9 INJECTION, SOLUTION INTRAVENOUS at 23:57

## 2020-07-27 RX ADMIN — HYDROCODONE BITARTRATE AND ACETAMINOPHEN 1 TABLET: 5; 325 TABLET ORAL at 22:14

## 2020-07-27 RX ADMIN — PIPERACILLIN SODIUM AND TAZOBACTAM SODIUM 4.5 G: 4; .5 INJECTION, POWDER, LYOPHILIZED, FOR SOLUTION INTRAVENOUS at 20:50

## 2020-07-27 RX ADMIN — MORPHINE SULFATE 4 MG: 4 INJECTION, SOLUTION INTRAMUSCULAR; INTRAVENOUS at 18:48

## 2020-07-27 RX ADMIN — VANCOMYCIN HYDROCHLORIDE 2000 MG: 1 INJECTION, POWDER, LYOPHILIZED, FOR SOLUTION INTRAVENOUS at 18:30

## 2020-07-27 RX ADMIN — OXYCODONE HYDROCHLORIDE AND ACETAMINOPHEN 1 TABLET: 5; 325 TABLET ORAL at 16:07

## 2020-07-27 RX ADMIN — Medication 10 ML: at 23:58

## 2020-07-27 ASSESSMENT — ENCOUNTER SYMPTOMS
CHOKING: 0
EYE PAIN: 0
COUGH: 0
COLOR CHANGE: 1
NAUSEA: 0

## 2020-07-27 ASSESSMENT — PAIN DESCRIPTION - LOCATION
LOCATION: FOOT;TOE (COMMENT WHICH ONE)
LOCATION: FOOT;TOE (COMMENT WHICH ONE)

## 2020-07-27 ASSESSMENT — PAIN SCALES - GENERAL
PAINLEVEL_OUTOF10: 8
PAINLEVEL_OUTOF10: 6
PAINLEVEL_OUTOF10: 8
PAINLEVEL_OUTOF10: 5
PAINLEVEL_OUTOF10: 7

## 2020-07-27 ASSESSMENT — PAIN DESCRIPTION - ORIENTATION
ORIENTATION: LEFT
ORIENTATION: LEFT

## 2020-07-27 ASSESSMENT — PAIN DESCRIPTION - PAIN TYPE
TYPE: ACUTE PAIN
TYPE: ACUTE PAIN

## 2020-07-27 NOTE — ED NOTES
Pt arrived with c/o wound on great toe of left foot. He states that he noticed a smell and looked at his sock and it was soaked with blood. Pt has deep tissue wound to great toe with necrotic tissue present. Bloody drainage present. There is a small wound on bottom of foot under great toe and on lateral side of the 4th toe. Pt states that he has not been checking his BS due to getting new glucometer and not having the correct strips.        Robinson Hein RN  07/27/20 0604
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
## 2020-07-28 LAB
ANION GAP SERPL CALCULATED.3IONS-SCNC: 11 MMOL/L (ref 9–17)
BUN BLDV-MCNC: 17 MG/DL (ref 8–23)
BUN/CREAT BLD: 22 (ref 9–20)
CALCIUM SERPL-MCNC: 9 MG/DL (ref 8.6–10.4)
CHLORIDE BLD-SCNC: 99 MMOL/L (ref 98–107)
CO2: 29 MMOL/L (ref 20–31)
CREAT SERPL-MCNC: 0.79 MG/DL (ref 0.7–1.2)
GFR AFRICAN AMERICAN: >60 ML/MIN
GFR NON-AFRICAN AMERICAN: >60 ML/MIN
GFR SERPL CREATININE-BSD FRML MDRD: ABNORMAL ML/MIN/{1.73_M2}
GFR SERPL CREATININE-BSD FRML MDRD: ABNORMAL ML/MIN/{1.73_M2}
GLUCOSE BLD-MCNC: 112 MG/DL (ref 70–99)
GLUCOSE BLD-MCNC: 151 MG/DL (ref 75–110)
GLUCOSE BLD-MCNC: 167 MG/DL (ref 75–110)
GLUCOSE BLD-MCNC: 178 MG/DL (ref 75–110)
GLUCOSE BLD-MCNC: 187 MG/DL (ref 75–110)
GLUCOSE BLD-MCNC: 289 MG/DL (ref 75–110)
GLUCOSE BLD-MCNC: 369 MG/DL (ref 75–110)
HCT VFR BLD CALC: 37.8 % (ref 40.7–50.3)
HEMOGLOBIN: 11.4 G/DL (ref 13–17)
LACTIC ACID, SEPSIS WHOLE BLOOD: NORMAL MMOL/L (ref 0.5–1.9)
LACTIC ACID, SEPSIS WHOLE BLOOD: NORMAL MMOL/L (ref 0.5–1.9)
LACTIC ACID, SEPSIS: 0.8 MMOL/L (ref 0.5–1.9)
LACTIC ACID, SEPSIS: 0.9 MMOL/L (ref 0.5–1.9)
MCH RBC QN AUTO: 29 PG (ref 25.2–33.5)
MCHC RBC AUTO-ENTMCNC: 30.2 G/DL (ref 28.4–34.8)
MCV RBC AUTO: 96.2 FL (ref 82.6–102.9)
NRBC AUTOMATED: 0 PER 100 WBC
PDW BLD-RTO: 12.3 % (ref 11.8–14.4)
PLATELET # BLD: 419 K/UL (ref 138–453)
PMV BLD AUTO: 9 FL (ref 8.1–13.5)
POTASSIUM SERPL-SCNC: 4.3 MMOL/L (ref 3.7–5.3)
RBC # BLD: 3.93 M/UL (ref 4.21–5.77)
SODIUM BLD-SCNC: 139 MMOL/L (ref 135–144)
WBC # BLD: 11 K/UL (ref 3.5–11.3)

## 2020-07-28 PROCEDURE — 80048 BASIC METABOLIC PNL TOTAL CA: CPT

## 2020-07-28 PROCEDURE — 94760 N-INVAS EAR/PLS OXIMETRY 1: CPT

## 2020-07-28 PROCEDURE — 99222 1ST HOSP IP/OBS MODERATE 55: CPT | Performed by: INTERNAL MEDICINE

## 2020-07-28 PROCEDURE — 6360000002 HC RX W HCPCS: Performed by: NURSE PRACTITIONER

## 2020-07-28 PROCEDURE — 2580000003 HC RX 258: Performed by: INTERNAL MEDICINE

## 2020-07-28 PROCEDURE — 6370000000 HC RX 637 (ALT 250 FOR IP): Performed by: NURSE PRACTITIONER

## 2020-07-28 PROCEDURE — 99232 SBSQ HOSP IP/OBS MODERATE 35: CPT | Performed by: INTERNAL MEDICINE

## 2020-07-28 PROCEDURE — 6360000002 HC RX W HCPCS: Performed by: INTERNAL MEDICINE

## 2020-07-28 PROCEDURE — 94640 AIRWAY INHALATION TREATMENT: CPT

## 2020-07-28 PROCEDURE — 1200000000 HC SEMI PRIVATE

## 2020-07-28 PROCEDURE — 36415 COLL VENOUS BLD VENIPUNCTURE: CPT

## 2020-07-28 PROCEDURE — 2580000003 HC RX 258: Performed by: NURSE PRACTITIONER

## 2020-07-28 PROCEDURE — 83605 ASSAY OF LACTIC ACID: CPT

## 2020-07-28 PROCEDURE — 85027 COMPLETE CBC AUTOMATED: CPT

## 2020-07-28 PROCEDURE — 87040 BLOOD CULTURE FOR BACTERIA: CPT

## 2020-07-28 PROCEDURE — 87641 MR-STAPH DNA AMP PROBE: CPT

## 2020-07-28 RX ORDER — MORPHINE SULFATE 2 MG/ML
2 INJECTION, SOLUTION INTRAMUSCULAR; INTRAVENOUS EVERY 4 HOURS PRN
Status: DISCONTINUED | OUTPATIENT
Start: 2020-07-28 | End: 2020-07-29 | Stop reason: SDUPTHER

## 2020-07-28 RX ORDER — SODIUM CHLORIDE 0.9 % (FLUSH) 0.9 %
10 SYRINGE (ML) INJECTION PRN
Status: DISCONTINUED | OUTPATIENT
Start: 2020-07-28 | End: 2020-08-03 | Stop reason: HOSPADM

## 2020-07-28 RX ORDER — SODIUM CHLORIDE 0.9 % (FLUSH) 0.9 %
10 SYRINGE (ML) INJECTION EVERY 12 HOURS SCHEDULED
Status: DISCONTINUED | OUTPATIENT
Start: 2020-07-28 | End: 2020-08-03 | Stop reason: HOSPADM

## 2020-07-28 RX ORDER — INSULIN DEGLUDEC INJECTION 100 U/ML
70 INJECTION, SOLUTION SUBCUTANEOUS NIGHTLY
Status: ON HOLD | COMMUNITY
End: 2021-01-11 | Stop reason: HOSPADM

## 2020-07-28 RX ORDER — HYDRALAZINE HYDROCHLORIDE 20 MG/ML
10 INJECTION INTRAMUSCULAR; INTRAVENOUS EVERY 6 HOURS PRN
Status: DISCONTINUED | OUTPATIENT
Start: 2020-07-28 | End: 2020-08-03 | Stop reason: HOSPADM

## 2020-07-28 RX ORDER — PANTOPRAZOLE SODIUM 40 MG/1
40 TABLET, DELAYED RELEASE ORAL
COMMUNITY
End: 2020-08-22

## 2020-07-28 RX ADMIN — VANCOMYCIN HYDROCHLORIDE 1250 MG: 1.25 INJECTION, POWDER, LYOPHILIZED, FOR SOLUTION INTRAVENOUS at 08:15

## 2020-07-28 RX ADMIN — IPRATROPIUM BROMIDE AND ALBUTEROL SULFATE 3 ML: .5; 3 SOLUTION RESPIRATORY (INHALATION) at 05:25

## 2020-07-28 RX ADMIN — SODIUM CHLORIDE: 9 INJECTION, SOLUTION INTRAVENOUS at 19:53

## 2020-07-28 RX ADMIN — ASPIRIN 81 MG 81 MG: 81 TABLET ORAL at 08:38

## 2020-07-28 RX ADMIN — INSULIN LISPRO 7 UNITS: 100 INJECTION, SOLUTION INTRAVENOUS; SUBCUTANEOUS at 21:16

## 2020-07-28 RX ADMIN — ANTACID TABLETS 1000 MG: 500 TABLET, CHEWABLE ORAL at 22:53

## 2020-07-28 RX ADMIN — IPRATROPIUM BROMIDE AND ALBUTEROL SULFATE 3 ML: .5; 3 SOLUTION RESPIRATORY (INHALATION) at 18:47

## 2020-07-28 RX ADMIN — MORPHINE SULFATE 2 MG: 2 INJECTION, SOLUTION INTRAMUSCULAR; INTRAVENOUS at 17:09

## 2020-07-28 RX ADMIN — INSULIN LISPRO 2 UNITS: 100 INJECTION, SOLUTION INTRAVENOUS; SUBCUTANEOUS at 00:08

## 2020-07-28 RX ADMIN — METFORMIN HYDROCHLORIDE 500 MG: 500 TABLET ORAL at 17:09

## 2020-07-28 RX ADMIN — ATORVASTATIN CALCIUM 40 MG: 40 TABLET, FILM COATED ORAL at 21:16

## 2020-07-28 RX ADMIN — MORPHINE SULFATE 2 MG: 2 INJECTION, SOLUTION INTRAMUSCULAR; INTRAVENOUS at 21:16

## 2020-07-28 RX ADMIN — OXYCODONE HYDROCHLORIDE AND ACETAMINOPHEN 2 TABLET: 5; 325 TABLET ORAL at 02:41

## 2020-07-28 RX ADMIN — PIPERACILLIN AND TAZOBACTAM 3.38 G: 3; .375 INJECTION, POWDER, LYOPHILIZED, FOR SOLUTION INTRAVENOUS at 02:52

## 2020-07-28 RX ADMIN — INSULIN LISPRO 3 UNITS: 100 INJECTION, SOLUTION INTRAVENOUS; SUBCUTANEOUS at 08:38

## 2020-07-28 RX ADMIN — INSULIN GLARGINE 25 UNITS: 100 INJECTION, SOLUTION SUBCUTANEOUS at 08:38

## 2020-07-28 RX ADMIN — INSULIN GLARGINE 25 UNITS: 100 INJECTION, SOLUTION SUBCUTANEOUS at 21:17

## 2020-07-28 RX ADMIN — FLUTICASONE PROPIONATE 2 SPRAY: 50 SPRAY, METERED NASAL at 12:22

## 2020-07-28 RX ADMIN — APIXABAN 5 MG: 5 TABLET, FILM COATED ORAL at 01:37

## 2020-07-28 RX ADMIN — PIPERACILLIN AND TAZOBACTAM 3.38 G: 3; .375 INJECTION, POWDER, LYOPHILIZED, FOR SOLUTION INTRAVENOUS at 19:56

## 2020-07-28 RX ADMIN — ONDANSETRON 4 MG: 2 INJECTION INTRAMUSCULAR; INTRAVENOUS at 06:25

## 2020-07-28 RX ADMIN — IPRATROPIUM BROMIDE AND ALBUTEROL SULFATE 3 ML: .5; 3 SOLUTION RESPIRATORY (INHALATION) at 10:34

## 2020-07-28 RX ADMIN — APIXABAN 5 MG: 5 TABLET, FILM COATED ORAL at 08:38

## 2020-07-28 RX ADMIN — PANTOPRAZOLE SODIUM 40 MG: 40 TABLET, DELAYED RELEASE ORAL at 06:57

## 2020-07-28 RX ADMIN — VANCOMYCIN HYDROCHLORIDE 1250 MG: 1.25 INJECTION, POWDER, LYOPHILIZED, FOR SOLUTION INTRAVENOUS at 19:56

## 2020-07-28 RX ADMIN — PIPERACILLIN AND TAZOBACTAM 3.38 G: 3; .375 INJECTION, POWDER, LYOPHILIZED, FOR SOLUTION INTRAVENOUS at 12:18

## 2020-07-28 RX ADMIN — MORPHINE SULFATE 2 MG: 2 INJECTION, SOLUTION INTRAMUSCULAR; INTRAVENOUS at 12:24

## 2020-07-28 RX ADMIN — POLYETHYLENE GLYCOL (3350) 17 G: 17 POWDER, FOR SOLUTION ORAL at 15:03

## 2020-07-28 RX ADMIN — ANTACID TABLETS 1000 MG: 500 TABLET, CHEWABLE ORAL at 00:57

## 2020-07-28 RX ADMIN — INSULIN LISPRO 9 UNITS: 100 INJECTION, SOLUTION INTRAVENOUS; SUBCUTANEOUS at 12:18

## 2020-07-28 RX ADMIN — OXYCODONE HYDROCHLORIDE AND ACETAMINOPHEN 2 TABLET: 5; 325 TABLET ORAL at 06:57

## 2020-07-28 RX ADMIN — INSULIN LISPRO 3 UNITS: 100 INJECTION, SOLUTION INTRAVENOUS; SUBCUTANEOUS at 17:09

## 2020-07-28 RX ADMIN — METFORMIN HYDROCHLORIDE 500 MG: 500 TABLET ORAL at 08:38

## 2020-07-28 ASSESSMENT — PAIN SCALES - GENERAL
PAINLEVEL_OUTOF10: 8
PAINLEVEL_OUTOF10: 8
PAINLEVEL_OUTOF10: 7
PAINLEVEL_OUTOF10: 0
PAINLEVEL_OUTOF10: 4
PAINLEVEL_OUTOF10: 8
PAINLEVEL_OUTOF10: 8
PAINLEVEL_OUTOF10: 3
PAINLEVEL_OUTOF10: 9

## 2020-07-28 ASSESSMENT — ENCOUNTER SYMPTOMS
DIARRHEA: 0
WHEEZING: 0
NAUSEA: 1
CHEST TIGHTNESS: 0
CONSTIPATION: 0
VOMITING: 0
SHORTNESS OF BREATH: 0
BLOOD IN STOOL: 0
ABDOMINAL PAIN: 0
COLOR CHANGE: 1
COUGH: 0
NAUSEA: 0
VOMITING: 1

## 2020-07-28 ASSESSMENT — PAIN DESCRIPTION - LOCATION
LOCATION: FOOT
LOCATION: FOOT

## 2020-07-28 ASSESSMENT — PAIN DESCRIPTION - PAIN TYPE
TYPE: ACUTE PAIN
TYPE: CHRONIC PAIN
TYPE: CHRONIC PAIN

## 2020-07-28 ASSESSMENT — PAIN DESCRIPTION - FREQUENCY
FREQUENCY: CONTINUOUS
FREQUENCY: CONTINUOUS

## 2020-07-28 ASSESSMENT — PAIN DESCRIPTION - ORIENTATION
ORIENTATION: LEFT
ORIENTATION: LEFT

## 2020-07-28 ASSESSMENT — PAIN DESCRIPTION - ONSET: ONSET: ON-GOING

## 2020-07-28 ASSESSMENT — PAIN DESCRIPTION - PROGRESSION
CLINICAL_PROGRESSION: NOT CHANGED

## 2020-07-28 ASSESSMENT — PAIN DESCRIPTION - DESCRIPTORS
DESCRIPTORS: ACHING
DESCRIPTORS: ACHING

## 2020-07-28 NOTE — PROGRESS NOTES
Physician Progress Note      PATIENT:               Kenyatta Henry  CSN #:                  [id number]  :                       1955  ADMIT DATE:       2020 3:31 PM  DISCH DATE:  RESPONDING  PROVIDER #:        Lydia Pozo DPM          QUERY TEXT:    Pt admitted with cellulitis of left foot . Pt noted to have uncontrolled DM   Please document the relationship if  any, between cellulitis and DM w   polyneuropathy. The medical record reflects the following:  Risk Factors: age  60 , Smoker, DM documented incompliance  Clinical Indicators: Previous BKA on right, now has left foot cellulitis and   necrosis on 4th digit of foot. Treatment: Podiatry consult , ID consult,  Abx: Vancomycin/Zosyn, pending   possible surgical management  Options provided:  -- Cellulitis  due to DM w polyneuropathy  -- Cellulitis  unrelated to DM w polyneuropathy  -- Other - I will add my own diagnosis  -- Disagree - Clinically unable to determine / Unknown  -- Refer to Clinical Documentation Reviewer    PROVIDER RESPONSE TEXT:    This patient has Cellulitis due to DM w polyneuropathy .     Query created by: Sofiya Wiggins on 2020 10:14 AM      Electronically signed by:  Lydia Pozo DPM 2020 1:01 PM
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
## 2020-07-28 NOTE — H&P (VIEW-ONLY)
fluctuating blood sugars BID 7/13/20   [facility], DO   aspirin 81 MG chewable tablet 81mg  tablet by oral route  every day    Historical Provider, MD   TRUEplus Lancets 30G MISC Inject 1 each into the skin 3 times daily TO CHECK FLUCTUATING BLOOD SUGARS IE HYPERGLYCEMIA 6/19/20   [facility], DO   ipratropium-albuterol (DUONEB) 0.5-2.5 (3) MG/3ML SOLN nebulizer solution Inhale 3 mLs into the lungs every 4 hours (while awake) 5/28/20   [facility], DO   albuterol sulfate HFA (VENTOLIN HFA) 108 (90 Base) MCG/ACT inhaler Inhale 2 puffs into the lungs every 6 hours as needed for Wheezing 5/28/20   [facility], DO   metFORMIN (GLUCOPHAGE) 500 MG tablet Take 1 tablet by mouth 2 times daily (with meals) 3/9/20   [facility], DO   apixaban (ELIQUIS) 5 MG TABS tablet Take 1 tablet by mouth 2 times daily 3/9/20   [facility], DO   insulin glargine (LANTUS) 100 UNIT/ML injection vial Inject 25 Units into the skin 2 times daily 2/26/20   Luisito Beaulieu, DO   insulin lispro (HUMALOG) 100 UNIT/ML injection vial Inject 0-18 Units into the skin 3 times daily (with meals) 2/26/20   Luisito Beaulieu, DO   insulin lispro (HUMALOG) 100 UNIT/ML injection vial Inject 0-9 Units into the skin nightly 2/26/20   Luisito Beaulieu, DO   atorvastatin (LIPITOR) 40 MG tablet Take 1 tablet by mouth daily 2/26/20   Luisito Beaulieu, DO   nicotine (NICODERM CQ) 21 MG/24HR Place 1 patch onto the skin daily as needed (if patient smokes/requests nicotine replacement therapy) 2/26/20   Luisito Beaulieu, DO   pantoprazole (PROTONIX) 40 MG tablet TAKE 1 TABLET BY MOUTH EVERY MORNING BEFORE BREAKFAST 2/26/20   Luisito Beaulieu, DO   diphenhydrAMINE-APAP, sleep, (TYLENOL PM EXTRA STRENGTH)  MG tablet Take 1 tablet by mouth nightly as needed for Sleep    Historical Provider, MD   Insulin Pen Needle 32G X 4 MM MISC 1 each by Does not apply route daily 1/14/19   [facility], DO        Allergies:     Gabapentin    Social History: Tobacco:    reports that he has been smoking cigarettes. He has a 30.00 pack-year smoking history. He quit smokeless tobacco use about 40 years ago. His smokeless tobacco use included chew. Alcohol:      reports current alcohol use. Drug Use:  reports previous drug use. Drug: Marijuana. Family History:     Family History   Problem Relation Age of Onset    Diabetes Mother     Cancer Mother     Alcohol Abuse Father     Cancer Sister     Alcohol Abuse Maternal Aunt     Alcohol Abuse Maternal Uncle     Alcohol Abuse Paternal Aunt        Review of Systems:     Positive and Negative as described in HPI. Review of Systems   Constitutional: Negative for chills, diaphoresis and fever. HENT: Negative for congestion. Eyes: Negative for visual disturbance. Respiratory: Negative for cough, chest tightness, shortness of breath and wheezing. Cardiovascular: Negative for chest pain, palpitations and leg swelling. Gastrointestinal: Negative for abdominal pain, blood in stool, constipation, diarrhea, nausea and vomiting. Endocrine: Negative for cold intolerance and heat intolerance. Genitourinary: Negative for difficulty urinating, dysuria, frequency and urgency. Musculoskeletal: Positive for arthralgias and myalgias. Skin: Positive for color change and wound. Neurological: Positive for numbness. Negative for dizziness, weakness, light-headedness and headaches. Chronic neuropathy, left lower extremity. Hematological: Does not bruise/bleed easily. Psychiatric/Behavioral: The patient is not nervous/anxious. All other systems reviewed and are negative.     Physical Exam:   /78   Pulse 96   Temp 98.1 °F (36.7 °C) (Oral)   Resp 16   Ht 6' 1\" (1.854 m)   Wt 170 lb (77.1 kg)   SpO2 97%   BMI 22.43 kg/m²   Temp (24hrs), Av.1 °F (36.7 °C), Min:98.1 °F (36.7 °C), Max:98.1 °F (36.7 °C)    Recent Labs     20  2138   POCGLU 250*       Intake/Output Summary (Last  hours) at 7/27/2020 2314  Last data filed at 7/27/2020 2303  Gross per 24 hour   Intake --   Output 100 ml   Net -100 ml       Physical Exam  Vitals signs and nursing note reviewed. Constitutional:       General: He is not in acute distress. Appearance: He is not diaphoretic. HENT:      Head: Normocephalic and atraumatic. Right Ear: Hearing normal.      Left Ear: Hearing normal.      Nose: Nose normal. No rhinorrhea. Eyes:      General: Lids are normal.      Extraocular Movements:      Right eye: Normal extraocular motion. Left eye: Normal extraocular motion. Conjunctiva/sclera: Conjunctivae normal.      Right eye: Right conjunctiva is not injected. Left eye: Left conjunctiva is not injected. Pupils: Pupils are equal, round, and reactive to light. Pupils are equal.      Right eye: Pupil is reactive. Left eye: Pupil is reactive. Neck:      Musculoskeletal: Neck supple. Thyroid: No thyromegaly. Vascular: No carotid bruit. Trachea: Trachea and phonation normal. No tracheal deviation. Cardiovascular:      Rate and Rhythm: Normal rate and regular rhythm. Pulses: Normal pulses. Heart sounds: Normal heart sounds. No murmur. Pulmonary:      Effort: Pulmonary effort is normal. No respiratory distress. Breath sounds: No stridor. Examination of the right-lower field reveals decreased breath sounds. Examination of the left-lower field reveals decreased breath sounds. Decreased breath sounds present. Abdominal:      General: Bowel sounds are normal. There is no distension. Palpations: Abdomen is soft. There is no mass. Tenderness: There is no abdominal tenderness. There is no guarding. Musculoskeletal:      Comments: Right BKA. Feet:      Comments: Left foot covered with dressing, clean dry and intact. See photos taken by podiatry. Skin:     General: Skin is warm and dry.    Neurological:      Mental Status: He is alert and oriented to control. 14. Monitor vital signs. 15. Follow chemistries. 16. Replete electrolytes as needed. 17. Continue eliquis. 18. Diabetic diet. 19. Activity as tolerated with assist.  20. PT/OT. Plan of care discussed in room with patient and Renny Del Castillo RN. Consultations:   IP CONSULT TO PODIATRY  IP CONSULT TO INTERNAL MEDICINE  PHARMACY TO DOSE VANCOMYCIN  IP CONSULT TO SOCIAL WORK  IP CONSULT TO INFECTIOUS DISEASES  IP CONSULT TO VASCULAR SURGERY    Patient is admitted as inpatient status because of co-morbidities listed above, severity of signs and symptoms as outlined, requirement for current medical therapies and most importantly because of direct risk to patient if care not provided in a hospital setting. Expected length of stay > 48 hours.     MARCELO Vazquez - CNP  7/27/2020  11:14 PM    Copy sent to Dr. Onofre Heart DO     (Please note that portions of this note were completed with a voice recognition program. Efforts were made to edit the dictations but occasionally words are mis-transcribed.)
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
## 2020-07-28 NOTE — FLOWSHEET NOTE
Writer rounding. Patient resting in bed. There are no visitors. Patient engage sin brief conversation, sharing some of the story of his illness and admission. He expresses no specific spiritual or emotional needs. Writer offers brief support and presence, and patient expresses thanks. Spiritual Care will follow as needed.        07/28/20 1620   Encounter Summary   Services provided to: Patient   Referral/Consult From: Krunal   Continue Visiting   (7/28/20)   Complexity of Encounter Low   Length of Encounter 15 minutes   Spiritual Assessment Completed Yes   Routine   Type Initial   Assessment Approachable   Intervention Active listening   Outcome Engaged in conversation
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
## 2020-07-28 NOTE — PROGRESS NOTES
Transitions of Care Pharmacy Service   Medication Review    The patient's list of current home medications has been reviewed. Source(s) of information: Patient/ Surescripts/ Dr. Daniel Mcrae office Kavita Flores [phone])    Based on information provided by the above source(s), I have updated the patient's home med list as described below. Please review the ACTION REQUESTED section of this note below for any discrepancies on current hospital orders. I changed or updated the following medications on the patient's home medication list:  Removed Lantus - see Tresiba  Albuterol inhaler - last filled 7/2019  Nicotine 21mg patch - pt states doesn't need     Added Tresiba 70 units nightly     Adjusted   none   Other Notes Gets Tresiba as samples from Dr. Daniel Mcrae. Pt states insulin is too expensive but has been told to buy from [facility] and Novolin would be less than $30 per month. Pt compliance poor with all meds based upon refill hx. PROVIDER ACTION REQUESTED  Discrepancies on current hospital orders that need to be addressed by a physician/nurse practitioner:    Medication Action Requested        none         Please feel free to call me with any questions about this encounter. Thank you.     [facility]   Transitions of Care Pharmacy Service  Phone:  [phone]  Fax: [phone]      Electronically signed by [facility] on 7/28/2020 at 3:27 PM           Medications Prior to Admission: pantoprazole (PROTONIX) 40 MG tablet, Take 40 mg by mouth every morning (before breakfast)  aspirin 81 MG chewable tablet, 81mg  tablet by oral route  every day  metFORMIN (GLUCOPHAGE) 500 MG tablet, Take 1 tablet by mouth 2 times daily (with meals)  apixaban (ELIQUIS) 5 MG TABS tablet, Take 1 tablet by mouth 2 times daily  insulin lispro (HUMALOG) 100 UNIT/ML injection vial, Inject 0-18 Units into the skin 3 times daily (with meals)  atorvastatin (LIPITOR) 40 MG tablet, Take 1 tablet by mouth daily  Insulin Degludec (TRESIBA FLEXTOUCH) 100 UNIT/ML SOPN, Inject 70 Units into the skin nightly  glucose monitoring kit (FREESTYLE) monitoring kit, 1 kit by Does not apply route daily  Lancets MISC, 1 each by Does not apply route 2 times daily  blood glucose test strips (ASCENSIA AUTODISC VI;ONE TOUCH ULTRA TEST VI) strip, Use with associated glucose meter to check fluctuating blood sugars BID  TRUEplus Lancets 30G MISC, Inject 1 each into the skin 3 times daily TO CHECK FLUCTUATING BLOOD SUGARS IE HYPERGLYCEMIA  ipratropium-albuterol (DUONEB) 0.5-2.5 (3) MG/3ML SOLN nebulizer solution, Inhale 3 mLs into the lungs every 4 hours (while awake)    insulin lispro (HUMALOG) 100 UNIT/ML injection vial, Inject 0-9 Units into the skin nightly  Insulin Pen Needle 32G X 4 MM MISC, 1 each by Does not apply route daily

## 2020-07-28 NOTE — PROGRESS NOTES
Progress Note  Podiatric Medicine and Surgery     Subjective     CC: Left toe wound    Patient seen and examined at bedside,  Reports of nausea, fatigue, 1 episode of emesis after pain medication yesterday. Afebrile, tachycardia mild hypertensive  PVRs to be obtained today    HPI :  Charly Daniels is a 60 y.o. male seen at [facility] emergency department. Patient states that he was walking around in his shoe, when he noticed that it was bleeding through. Patient reports that he usually only wear socks, because he knows that this can be a problem. Patient has a history of a right BKA, left fifth ray resection. Patient also has a history of uncontrolled diabetes. Patient reported that he has had his blood sugars ranging between 200 and 600. Patient also has a well-known history of poor compliance, and leaving the emergency department AMA. Patient does not have a podiatrist he follows with. Patient denies any other pedal complaints       ROS: Denies N/V/F/C/SOB/CP. Otherwise negative except at stated in the HPI.      Medications:  Scheduled Meds:   pantoprazole  40 mg Oral QAM AC    ipratropium-albuterol  3 mL Inhalation Q4H WA    insulin glargine  25 Units Subcutaneous BID    atorvastatin  40 mg Oral Nightly    metFORMIN  500 mg Oral BID WC    aspirin  81 mg Oral Daily    apixaban  5 mg Oral BID    sodium chloride flush  10 mL Intravenous 2 times per day    piperacillin-tazobactam  3.375 g Intravenous Q8H    insulin lispro  0-18 Units Subcutaneous TID WC    insulin lispro  0-9 Units Subcutaneous Nightly    vancomycin  1,250 mg Intravenous Q12H    vancomycin (VANCOCIN) intermittent dosing (placeholder)   Other RX Placeholder    fluticasone  2 spray Each Nostril Daily       Continuous Infusions:   sodium chloride 50 mL/hr at 07/27/20 2357    dextrose         PRN Meds:hydrALAZINE, sodium chloride flush, potassium chloride **OR** potassium alternative oral replacement **OR** potassium chloride, magnesium sulfate, acetaminophen **OR** acetaminophen, polyethylene glycol, promethazine **OR** ondansetron, nicotine, glucose, dextrose, glucagon (rDNA), dextrose, diphenhydrAMINE, oxyCODONE-acetaminophen **OR** oxyCODONE-acetaminophen, calcium carbonate    Objective     Vitals:  Patient Vitals for the past 8 hrs:   BP Temp Temp src Pulse Resp SpO2 Weight   20 0400 -- -- -- -- -- -- 155 lb (70.3 kg)   20 2301 138/78 98.1 °F (36.7 °C) Oral 96 16 97 % --     Average, Min, and Max for last 24 hours Vitals:  TEMPERATURE:  Temp  Av.1 °F (36.7 °C)  Min: 98.1 °F (36.7 °C)  Max: 98.1 °F (36.7 °C)    RESPIRATIONS RANGE: Resp  Av  Min: 16  Max: 18    PULSE RANGE: Pulse  Av  Min: 96  Max: 108    BLOOD PRESSURE RANGE:  Systolic (14QMF), ZSR:727 , Min:125 , YPX:288   ; Diastolic (00KKO), XWJ:83, Min:63, Max:78      PULSE OXIMETRY RANGE: SpO2  Av.5 %  Min: 97 %  Max: 98 %    No intake/output data recorded. CBC:  Recent Labs     20  1615 20  0506   WBC 12.5* 11.0   HGB 11.3* 11.4*   HCT 37.6* 37.8*    419   CRP 62.4*  --         BMP:  Recent Labs     20  1615 20  0506   * 139   K 4.9 4.3   CL 97* 99   CO2 27 29   BUN 26* 17   CREATININE 0.93 0.79   GLUCOSE 331* 112*   CALCIUM 8.9 9.0        Coags:  No results for input(s): APTT, PROT, INR in the last 72 hours. Lab Results   Component Value Date    SEDRATE 91 (H) 2020     Recent Labs     20  1615   CRP 62.4*       Lower Extremity Physical Exam:  Vascular: DP and PT pulses are nonpalpable. DP and PT monophasic on doppler (DP was very faint) CFT >5 seconds to all digits. Hair growth is absent to the level of the digits. No edema. Left leg is warm to cold of the left lower extremity. Left hallux  is cold to the touch.     Neuro: Saph/sural/SP/DP/plantar sensation  absent to light touch.       Musculoskeletal: Muscle strength testing was deferred due to patient's current state. Decreased range of motion noted to the ankle, limited range of motion of the first digit of the left foot. Gross deformity is left partial fifth ray resection and Right below the knee amputation      Dermatologic: Left hallux appears to have a circumferential blister that has been deroofed. There is still some loose intact skin to the dorsal aspect of the hallux. Increased maceration on lateral aspect of left hallux. Erythema noted to the dorsum of the foot, appears to be improving, associated increase in warmth maintained. The right hallux appears bigger than at the last physical exam, with increased skin necrosis at the level of the left hallux.     Full thickness ulceration #1 noted to the plantar medial aspect of the right 1st MT, measures 0.1 x 0.1 x 0.2. Periwound is hyperkeratotic, no erythema or drainage noted. No increase in warmth, fluctuance, sinus tracking, or induration appreciated     Wound on the plantar lateral aspect of the fourth ray appears with a dry stable eschar that is unstageable. No probe to bone no sinus tracking no undermining.     No purulence was appreciated, or able to be expressed. There was a foul odor associated with the hallux. Animal hair was noted to be in the wound of the left hallux, and also adhered to the ulceration appreciated at the lateral aspect of the fourth metatarsal head. Clinical Images:                  Imaging:   XR FOOT LEFT (MIN 3 VIEWS)   Final Result   Soft tissue swelling of the 1st ray with some associated punctate radiopaque   foreign bodies. Resorption and/or amputation of the 5th ray some associated soft tissue   changes. Cultures: none    Assessment   Sanjuana Nelson is a 60 y.o. male with   1. Ischemic left hallux  2. Cellulitis, left foot  3. Jones grade 1 wound, left foot  4. Unstageable wound to 4th digit, left foot  5. S/p partial 5th ray amputation, left foot  6. S/p Right BKA  7. PAD  8.  Uncontrolled type 2 diabetes    Principal Problem:    Uncontrolled type 2 diabetes mellitus with foot ulcer ([facility] [address] [facility]  Active Problems:    Tobacco dependence    COPD (chronic obstructive pulmonary disease) (HCC)    Dyslipidemia    Essential hypertension  Resolved Problems:    * No resolved hospital problems. *       Plan     · Patient examined and evaluated at bedside   · Treatment options discussed in detail with the patient  · Medical management per primary  · Abx: Vancomycin/Zosyn, ID consulted, appreciate recs  · Vascular consulted, appreciate recs  · Discussed with patient of treatment options. Explained to patient that due to the extent of necrosis that a proximal amputation may be necessary in the future. Pending vascular recommendations further treatment to be decided. · Dressings applied to left leg include: Betadine wet-to-dry, light Kerlix. · Discussed with Dr. Scot Bliss DPM   Podiatric Medicine & Surgery   7/28/2020 at 6:46 AM     Senior Resident Statement:  I have discussed the case, including pertinent history and exam findings with the resident. I agree with the assessment, plan and orders as documented by the intern. Any changes were made in the note above.        Electronically signed by Dana Bhagat DPM on 7/28/2020 at 8:29 AM
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
## 2020-07-28 NOTE — PLAN OF CARE
Problem: Falls - Risk of:  Goal: Will remain free from falls  Description: Will remain free from falls  7/28/2020 1126 by Mary Swanson RN  Outcome: Ongoing  7/28/2020 0319 by Jose Luis Cavazos RN  Outcome: Ongoing  Goal: Absence of physical injury  Description: Absence of physical injury  7/28/2020 1126 by Mary Swanson RN  Outcome: Ongoing  7/28/2020 0319 by Jose Luis Cavazos RN  Outcome: Ongoing
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
## 2020-07-28 NOTE — PROGRESS NOTES
Pt admitted to room 2010 from ER  Oriented to room and call light/tv controls. Bed in lowest position, wheels locked, 2/4 side rails up  Call light in reach, room free of clutter, adequate lighting provided.

## 2020-07-28 NOTE — CONSULTS
Pharmacy Note  Vancomycin Consult - Initial Note     Anna Hooks is a 60 y.o. male ordered Vancomycin. .  Current diagnosis for which MRSA is suspected/confirmed: diabetic foot infection  Vancomycin order/consult received from Dr. Allison Alberto .     Additional antimicrobials:  Zosyn    Patient Active Problem List   Diagnosis    Type 2 diabetes mellitus with diabetic polyneuropathy, with long-term current use of insulin ([facility])    Neuropathic pain, leg    Diabetic polyneuropathy ([facility])    Allergic rhinitis    Tobacco dependence    COPD (chronic obstructive pulmonary disease) ([facility])    Edentulous    Dysphagia    Lung nodules    Esophageal cancer ([facility])    Fracture of humerus, left, closed    Closed fracture of humerus    DM type 2 with diabetic peripheral neuropathy ([facility])    Chronic obstructive pulmonary disease ([facility])    Dyslipidemia    Gastroesophageal reflux disease    Chronic pain syndrome    Marijuana use    History of colon polyps    Cellulitis of right heel    PVD (peripheral vascular disease) ([facility])    Functional diarrhea    Sepsis due to methicillin resistant Staphylococcus aureus (MRSA) ([facility])    History of esophageal cancer    Osteomyelitis, chronic, ankle or foot ([facility])    PAD (peripheral artery disease) ([address]    Other pulmonary embolism without acute cor pulmonale ([facility])    Carotid stenosis, asymptomatic, bilateral    Lower limb amputation status    Fx humeral neck, right, closed, initial encounter    Transient hypotension    Constipation due to opioid therapy    Acute kidney injury ([address]    Diabetic ulcer of left midfoot associated with type 2 diabetes mellitus, with fat layer exposed ([address]    Complication of below knee amputation stump ([facility])    COPD exacerbation ([facility])    Hyponatremia    Pain, phantom limb ([address]    Below-knee amputation of right lower extremity ([address]    Hyperglycemia    Moderate protein malnutrition ([address]    Essential hypertension    Uncontrolled type 2 diabetes mellitus with hyperglycemia ([facility]    History of pulmonary embolism - 2017    Atelectasis    Uncontrolled type 2 diabetes mellitus with foot ulcer ([facility]       Height:     Wt Readings from Last 1 Encounters:   07/27/20 170 lb (77.1 kg)     Allergies:  Gabapentin       No results found for: PROCAL  Recent Labs     07/27/20  1615   BUN 26*     Recent Labs     07/27/20  1615   CREATININE 0.93     Recent Labs     07/27/20  1615   WBC 12.5*       Intake/Output Summary (Last 24 hours) at 7/27/2020 2341  Last data filed at 7/27/2020 2303  Gross per 24 hour   Intake --   Output 100 ml   Net -100 ml       Temp: 98.6 F    Culture Date / Garth Sin  /  Results  ---  Actual Weight:    Wt Readings from Last 1 Encounters:   07/27/20 170 lb (77.1 kg)     CrCl based on IBW\"  90 ml/min        PLAN   Initial loading dose of 25mg/kg (max of 2500 mg) = 2000  mg   2. Vancomycin 1250 mg IV every 12 hours. 3.   Ensured BUN/sCr ordered at baseline and at least every 3rd day. 4.   ONLY for suspected pneumonia or COPD: MRSA nasal swab  is not ordered. Non respiratory infection. .    5. Trough ordered for: 7/29/20 @0600 . Trough Goals (Non-dialysis patient) Peaks are not routinely recommended. 10-20 mcg/mL for mild skin/soft tissue infections or UTI.  15-20 mcg/mL is the target trough for all other indications that MRSA infection is suspected. TROUGH TIMING: (Additional levels drawn based on renal function and/or clinical response). Dosing interval Timing of Trough   Every 8 hr, 12 hr, or 18 hr regimen Prior to the 4th dose  Twice weekly troughs for every 8 hour dosing   Every 24 hr regimen Prior to the 3rd or 4th dose   Every 36 hr regimen Prior to the 3rd dose           Thank you for the consult. Pharmacy will continue to follow.   RUTH SILVESTRE RPh/Asaf  7/27/2020  11:41 PM

## 2020-07-29 ENCOUNTER — APPOINTMENT (OUTPATIENT)
Dept: INTERVENTIONAL RADIOLOGY/VASCULAR | Age: 65
DRG: 271 | End: 2020-07-29
Payer: MEDICARE

## 2020-07-29 LAB
ABSOLUTE EOS #: 0.29 K/UL (ref 0–0.44)
ABSOLUTE IMMATURE GRANULOCYTE: 0.08 K/UL (ref 0–0.3)
ABSOLUTE LYMPH #: 2.08 K/UL (ref 1.1–3.7)
ABSOLUTE MONO #: 1.13 K/UL (ref 0.1–1.2)
ANION GAP SERPL CALCULATED.3IONS-SCNC: 11 MMOL/L (ref 9–17)
BASOPHILS # BLD: 1 % (ref 0–2)
BASOPHILS ABSOLUTE: 0.06 K/UL (ref 0–0.2)
BUN BLDV-MCNC: 12 MG/DL (ref 8–23)
BUN/CREAT BLD: 16 (ref 9–20)
C-REACTIVE PROTEIN: 53.5 MG/L (ref 0–5)
CALCIUM SERPL-MCNC: 8.7 MG/DL (ref 8.6–10.4)
CHLORIDE BLD-SCNC: 103 MMOL/L (ref 98–107)
CO2: 30 MMOL/L (ref 20–31)
CREAT SERPL-MCNC: 0.74 MG/DL (ref 0.7–1.2)
DIFFERENTIAL TYPE: ABNORMAL
EOSINOPHILS RELATIVE PERCENT: 3 % (ref 1–4)
GFR AFRICAN AMERICAN: >60 ML/MIN
GFR NON-AFRICAN AMERICAN: >60 ML/MIN
GFR SERPL CREATININE-BSD FRML MDRD: NORMAL ML/MIN/{1.73_M2}
GFR SERPL CREATININE-BSD FRML MDRD: NORMAL ML/MIN/{1.73_M2}
GLUCOSE BLD-MCNC: 139 MG/DL (ref 75–110)
GLUCOSE BLD-MCNC: 164 MG/DL (ref 75–110)
GLUCOSE BLD-MCNC: 238 MG/DL (ref 75–110)
GLUCOSE BLD-MCNC: 306 MG/DL (ref 75–110)
GLUCOSE BLD-MCNC: 73 MG/DL (ref 75–110)
GLUCOSE BLD-MCNC: 75 MG/DL (ref 70–99)
GLUCOSE BLD-MCNC: 77 MG/DL (ref 75–110)
GLUCOSE BLD-MCNC: 87 MG/DL (ref 75–110)
HCT VFR BLD CALC: 36.2 % (ref 40.7–50.3)
HEMOGLOBIN: 11.3 G/DL (ref 13–17)
IMMATURE GRANULOCYTES: 1 %
INR BLD: 1
LYMPHOCYTES # BLD: 19 % (ref 24–43)
MCH RBC QN AUTO: 30.3 PG (ref 25.2–33.5)
MCHC RBC AUTO-ENTMCNC: 31.2 G/DL (ref 28.4–34.8)
MCV RBC AUTO: 97.1 FL (ref 82.6–102.9)
MONOCYTES # BLD: 10 % (ref 3–12)
MRSA, DNA, NASAL: NORMAL
NRBC AUTOMATED: 0 PER 100 WBC
PDW BLD-RTO: 12.4 % (ref 11.8–14.4)
PLATELET # BLD: 395 K/UL (ref 138–453)
PLATELET ESTIMATE: ABNORMAL
PMV BLD AUTO: 8.9 FL (ref 8.1–13.5)
POTASSIUM SERPL-SCNC: 4.5 MMOL/L (ref 3.7–5.3)
PROTHROMBIN TIME: 13.1 SEC (ref 11.5–14.2)
RBC # BLD: 3.73 M/UL (ref 4.21–5.77)
RBC # BLD: ABNORMAL 10*6/UL
SARS-COV-2, PCR: NORMAL
SARS-COV-2, RAPID: NOT DETECTED
SARS-COV-2: NORMAL
SEG NEUTROPHILS: 66 % (ref 36–65)
SEGMENTED NEUTROPHILS ABSOLUTE COUNT: 7.21 K/UL (ref 1.5–8.1)
SODIUM BLD-SCNC: 144 MMOL/L (ref 135–144)
SOURCE: NORMAL
SPECIMEN DESCRIPTION: NORMAL
VANCOMYCIN TROUGH DATE LAST DOSE: NORMAL
VANCOMYCIN TROUGH DOSE AMOUNT: NORMAL
VANCOMYCIN TROUGH TIME LAST DOSE: NORMAL
VANCOMYCIN TROUGH: 17.2 UG/ML (ref 10–20)
WBC # BLD: 10.9 K/UL (ref 3.5–11.3)
WBC # BLD: ABNORMAL 10*3/UL

## 2020-07-29 PROCEDURE — 94640 AIRWAY INHALATION TREATMENT: CPT

## 2020-07-29 PROCEDURE — 6370000000 HC RX 637 (ALT 250 FOR IP): Performed by: SURGERY

## 2020-07-29 PROCEDURE — 7100000010 HC PHASE II RECOVERY - FIRST 15 MIN

## 2020-07-29 PROCEDURE — 047J3ZZ DILATION OF LEFT EXTERNAL ILIAC ARTERY, PERCUTANEOUS APPROACH: ICD-10-PCS | Performed by: SURGERY

## 2020-07-29 PROCEDURE — 99232 SBSQ HOSP IP/OBS MODERATE 35: CPT | Performed by: INTERNAL MEDICINE

## 2020-07-29 PROCEDURE — 047S3ZZ DILATION OF LEFT POSTERIOR TIBIAL ARTERY, PERCUTANEOUS APPROACH: ICD-10-PCS | Performed by: SURGERY

## 2020-07-29 PROCEDURE — 6360000002 HC RX W HCPCS: Performed by: INTERNAL MEDICINE

## 2020-07-29 PROCEDURE — 75716 ARTERY X-RAYS ARMS/LEGS: CPT | Performed by: SURGERY

## 2020-07-29 PROCEDURE — 94761 N-INVAS EAR/PLS OXIMETRY MLT: CPT

## 2020-07-29 PROCEDURE — 6360000002 HC RX W HCPCS: Performed by: SURGERY

## 2020-07-29 PROCEDURE — 75774 ARTERY X-RAY EACH VESSEL: CPT | Performed by: SURGERY

## 2020-07-29 PROCEDURE — 86140 C-REACTIVE PROTEIN: CPT

## 2020-07-29 PROCEDURE — C1769 GUIDE WIRE: HCPCS

## 2020-07-29 PROCEDURE — B4101ZZ FLUOROSCOPY OF ABDOMINAL AORTA USING LOW OSMOLAR CONTRAST: ICD-10-PCS | Performed by: SURGERY

## 2020-07-29 PROCEDURE — 7100000011 HC PHASE II RECOVERY - ADDTL 15 MIN

## 2020-07-29 PROCEDURE — 6360000002 HC RX W HCPCS: Performed by: NURSE PRACTITIONER

## 2020-07-29 PROCEDURE — 047L3Z1 DILATION OF LEFT FEMORAL ARTERY USING DRUG-COATED BALLOON, PERCUTANEOUS APPROACH: ICD-10-PCS | Performed by: SURGERY

## 2020-07-29 PROCEDURE — 047N3ZZ DILATION OF LEFT POPLITEAL ARTERY, PERCUTANEOUS APPROACH: ICD-10-PCS | Performed by: SURGERY

## 2020-07-29 PROCEDURE — 82947 ASSAY GLUCOSE BLOOD QUANT: CPT

## 2020-07-29 PROCEDURE — 2580000003 HC RX 258: Performed by: SURGERY

## 2020-07-29 PROCEDURE — U0002 COVID-19 LAB TEST NON-CDC: HCPCS

## 2020-07-29 PROCEDURE — 2580000003 HC RX 258: Performed by: INTERNAL MEDICINE

## 2020-07-29 PROCEDURE — 37229 HC TIB PER TERRITORY ATHER: CPT | Performed by: SURGERY

## 2020-07-29 PROCEDURE — 6360000004 HC RX CONTRAST MEDICATION: Performed by: SURGERY

## 2020-07-29 PROCEDURE — 1200000000 HC SEMI PRIVATE

## 2020-07-29 PROCEDURE — 80202 ASSAY OF VANCOMYCIN: CPT

## 2020-07-29 PROCEDURE — 85610 PROTHROMBIN TIME: CPT

## 2020-07-29 PROCEDURE — 75625 CONTRAST EXAM ABDOMINL AORTA: CPT | Performed by: SURGERY

## 2020-07-29 PROCEDURE — 99152 MOD SED SAME PHYS/QHP 5/>YRS: CPT | Performed by: SURGERY

## 2020-07-29 PROCEDURE — 04CS3ZZ EXTIRPATION OF MATTER FROM LEFT POSTERIOR TIBIAL ARTERY, PERCUTANEOUS APPROACH: ICD-10-PCS | Performed by: SURGERY

## 2020-07-29 PROCEDURE — 87641 MR-STAPH DNA AMP PROBE: CPT

## 2020-07-29 PROCEDURE — 80048 BASIC METABOLIC PNL TOTAL CA: CPT

## 2020-07-29 PROCEDURE — 2580000003 HC RX 258: Performed by: NURSE PRACTITIONER

## 2020-07-29 PROCEDURE — 047D3ZZ DILATION OF LEFT COMMON ILIAC ARTERY, PERCUTANEOUS APPROACH: ICD-10-PCS | Performed by: SURGERY

## 2020-07-29 PROCEDURE — 36415 COLL VENOUS BLD VENIPUNCTURE: CPT

## 2020-07-29 PROCEDURE — 85025 COMPLETE CBC W/AUTO DIFF WBC: CPT

## 2020-07-29 PROCEDURE — 99153 MOD SED SAME PHYS/QHP EA: CPT | Performed by: SURGERY

## 2020-07-29 PROCEDURE — B41G1ZZ FLUOROSCOPY OF LEFT LOWER EXTREMITY ARTERIES USING LOW OSMOLAR CONTRAST: ICD-10-PCS | Performed by: SURGERY

## 2020-07-29 PROCEDURE — 37225 HC FEM POP TERRITORY ATHERECTOMY: CPT | Performed by: SURGERY

## 2020-07-29 PROCEDURE — 047N3Z1 DILATION OF LEFT POPLITEAL ARTERY USING DRUG-COATED BALLOON, PERCUTANEOUS APPROACH: ICD-10-PCS | Performed by: SURGERY

## 2020-07-29 RX ORDER — ONDANSETRON 2 MG/ML
4 INJECTION INTRAMUSCULAR; INTRAVENOUS EVERY 8 HOURS PRN
Status: DISCONTINUED | OUTPATIENT
Start: 2020-07-29 | End: 2020-08-03 | Stop reason: HOSPADM

## 2020-07-29 RX ORDER — HEPARIN SODIUM 1000 [USP'U]/ML
INJECTION, SOLUTION INTRAVENOUS; SUBCUTANEOUS
Status: COMPLETED | OUTPATIENT
Start: 2020-07-29 | End: 2020-07-29

## 2020-07-29 RX ORDER — PANTOPRAZOLE SODIUM 40 MG/1
40 TABLET, DELAYED RELEASE ORAL
Status: DISCONTINUED | OUTPATIENT
Start: 2020-07-30 | End: 2020-08-03 | Stop reason: HOSPADM

## 2020-07-29 RX ORDER — BISACODYL 10 MG
10 SUPPOSITORY, RECTAL RECTAL DAILY PRN
Status: DISCONTINUED | OUTPATIENT
Start: 2020-07-29 | End: 2020-08-03 | Stop reason: HOSPADM

## 2020-07-29 RX ORDER — MORPHINE SULFATE 2 MG/ML
2 INJECTION, SOLUTION INTRAMUSCULAR; INTRAVENOUS
Status: DISCONTINUED | OUTPATIENT
Start: 2020-07-29 | End: 2020-08-03 | Stop reason: HOSPADM

## 2020-07-29 RX ORDER — CLOPIDOGREL BISULFATE 75 MG/1
300 TABLET ORAL ONCE
Status: COMPLETED | OUTPATIENT
Start: 2020-07-29 | End: 2020-07-29

## 2020-07-29 RX ORDER — MIDAZOLAM HYDROCHLORIDE 1 MG/ML
INJECTION INTRAMUSCULAR; INTRAVENOUS
Status: COMPLETED | OUTPATIENT
Start: 2020-07-29 | End: 2020-07-29

## 2020-07-29 RX ORDER — MORPHINE SULFATE 4 MG/ML
4 INJECTION, SOLUTION INTRAMUSCULAR; INTRAVENOUS
Status: DISCONTINUED | OUTPATIENT
Start: 2020-07-29 | End: 2020-08-03 | Stop reason: HOSPADM

## 2020-07-29 RX ORDER — CLOPIDOGREL BISULFATE 75 MG/1
75 TABLET ORAL DAILY
Status: DISCONTINUED | OUTPATIENT
Start: 2020-07-30 | End: 2020-08-03 | Stop reason: HOSPADM

## 2020-07-29 RX ORDER — FENTANYL CITRATE 50 UG/ML
INJECTION, SOLUTION INTRAMUSCULAR; INTRAVENOUS
Status: COMPLETED | OUTPATIENT
Start: 2020-07-29 | End: 2020-07-29

## 2020-07-29 RX ORDER — IPRATROPIUM BROMIDE AND ALBUTEROL SULFATE 2.5; .5 MG/3ML; MG/3ML
3 SOLUTION RESPIRATORY (INHALATION) EVERY 4 HOURS PRN
Status: DISCONTINUED | OUTPATIENT
Start: 2020-07-29 | End: 2020-08-03 | Stop reason: HOSPADM

## 2020-07-29 RX ORDER — SODIUM CHLORIDE 450 MG/100ML
INJECTION, SOLUTION INTRAVENOUS CONTINUOUS
Status: DISCONTINUED | OUTPATIENT
Start: 2020-07-29 | End: 2020-08-01

## 2020-07-29 RX ORDER — IODIXANOL 320 MG/ML
INJECTION, SOLUTION INTRAVASCULAR
Status: COMPLETED | OUTPATIENT
Start: 2020-07-29 | End: 2020-07-29

## 2020-07-29 RX ORDER — HYDROCODONE BITARTRATE AND ACETAMINOPHEN 5; 325 MG/1; MG/1
2 TABLET ORAL EVERY 4 HOURS PRN
Status: DISCONTINUED | OUTPATIENT
Start: 2020-07-29 | End: 2020-08-03 | Stop reason: HOSPADM

## 2020-07-29 RX ORDER — HYDROCODONE BITARTRATE AND ACETAMINOPHEN 5; 325 MG/1; MG/1
1 TABLET ORAL EVERY 4 HOURS PRN
Status: DISCONTINUED | OUTPATIENT
Start: 2020-07-29 | End: 2020-08-03 | Stop reason: HOSPADM

## 2020-07-29 RX ORDER — ACETAMINOPHEN 325 MG/1
650 TABLET ORAL EVERY 4 HOURS PRN
Status: DISCONTINUED | OUTPATIENT
Start: 2020-07-29 | End: 2020-08-03 | Stop reason: HOSPADM

## 2020-07-29 RX ADMIN — IODIXANOL 150 ML: 320 INJECTION, SOLUTION INTRAVASCULAR at 10:35

## 2020-07-29 RX ADMIN — VANCOMYCIN HYDROCHLORIDE 1250 MG: 1.25 INJECTION, POWDER, LYOPHILIZED, FOR SOLUTION INTRAVENOUS at 20:13

## 2020-07-29 RX ADMIN — INSULIN LISPRO 6 UNITS: 100 INJECTION, SOLUTION INTRAVENOUS; SUBCUTANEOUS at 18:07

## 2020-07-29 RX ADMIN — Medication 50 MCG: at 11:08

## 2020-07-29 RX ADMIN — SODIUM CHLORIDE 3 G: 900 INJECTION INTRAVENOUS at 22:07

## 2020-07-29 RX ADMIN — MIDAZOLAM 1 MG: 1 INJECTION INTRAMUSCULAR; INTRAVENOUS at 10:25

## 2020-07-29 RX ADMIN — ATORVASTATIN CALCIUM 40 MG: 40 TABLET, FILM COATED ORAL at 21:45

## 2020-07-29 RX ADMIN — MORPHINE SULFATE 2 MG: 2 INJECTION, SOLUTION INTRAMUSCULAR; INTRAVENOUS at 09:28

## 2020-07-29 RX ADMIN — Medication 50 MCG: at 10:25

## 2020-07-29 RX ADMIN — SODIUM CHLORIDE: 4.5 INJECTION, SOLUTION INTRAVENOUS at 12:13

## 2020-07-29 RX ADMIN — MORPHINE SULFATE 2 MG: 2 INJECTION, SOLUTION INTRAMUSCULAR; INTRAVENOUS at 01:25

## 2020-07-29 RX ADMIN — MIDAZOLAM 1 MG: 1 INJECTION INTRAMUSCULAR; INTRAVENOUS at 10:01

## 2020-07-29 RX ADMIN — HEPARIN SODIUM 5000 UNITS: 1000 INJECTION, SOLUTION INTRAVENOUS; SUBCUTANEOUS at 10:19

## 2020-07-29 RX ADMIN — MIDAZOLAM 1 MG: 1 INJECTION INTRAMUSCULAR; INTRAVENOUS at 11:07

## 2020-07-29 RX ADMIN — FLUTICASONE PROPIONATE 2 SPRAY: 50 SPRAY, METERED NASAL at 14:16

## 2020-07-29 RX ADMIN — HYDROCODONE BITARTRATE AND ACETAMINOPHEN 2 TABLET: 5; 325 TABLET ORAL at 20:20

## 2020-07-29 RX ADMIN — MORPHINE SULFATE 4 MG: 4 INJECTION, SOLUTION INTRAMUSCULAR; INTRAVENOUS at 14:12

## 2020-07-29 RX ADMIN — INSULIN GLARGINE 25 UNITS: 100 INJECTION, SOLUTION SUBCUTANEOUS at 21:44

## 2020-07-29 RX ADMIN — MORPHINE SULFATE 2 MG: 2 INJECTION, SOLUTION INTRAMUSCULAR; INTRAVENOUS at 05:31

## 2020-07-29 RX ADMIN — PIPERACILLIN AND TAZOBACTAM 3.38 G: 3; .375 INJECTION, POWDER, LYOPHILIZED, FOR SOLUTION INTRAVENOUS at 03:11

## 2020-07-29 RX ADMIN — CLOPIDOGREL BISULFATE 300 MG: 75 TABLET ORAL at 12:11

## 2020-07-29 RX ADMIN — MORPHINE SULFATE 4 MG: 4 INJECTION, SOLUTION INTRAMUSCULAR; INTRAVENOUS at 21:44

## 2020-07-29 RX ADMIN — IPRATROPIUM BROMIDE AND ALBUTEROL SULFATE 3 ML: .5; 3 SOLUTION RESPIRATORY (INHALATION) at 14:27

## 2020-07-29 RX ADMIN — SODIUM CHLORIDE 3 G: 900 INJECTION INTRAVENOUS at 14:13

## 2020-07-29 RX ADMIN — INSULIN LISPRO 6 UNITS: 100 INJECTION, SOLUTION INTRAVENOUS; SUBCUTANEOUS at 21:44

## 2020-07-29 RX ADMIN — VANCOMYCIN HYDROCHLORIDE 1250 MG: 1.25 INJECTION, POWDER, LYOPHILIZED, FOR SOLUTION INTRAVENOUS at 06:45

## 2020-07-29 RX ADMIN — Medication 50 MCG: at 10:01

## 2020-07-29 RX ADMIN — BISACODYL 10 MG: 10 SUPPOSITORY RECTAL at 16:03

## 2020-07-29 ASSESSMENT — PAIN DESCRIPTION - PAIN TYPE: TYPE: SURGICAL PAIN

## 2020-07-29 ASSESSMENT — PAIN SCALES - GENERAL
PAINLEVEL_OUTOF10: 8
PAINLEVEL_OUTOF10: 0
PAINLEVEL_OUTOF10: 8
PAINLEVEL_OUTOF10: 8
PAINLEVEL_OUTOF10: 0
PAINLEVEL_OUTOF10: 0
PAINLEVEL_OUTOF10: 8
PAINLEVEL_OUTOF10: 4

## 2020-07-29 ASSESSMENT — PAIN DESCRIPTION - ONSET: ONSET: ON-GOING

## 2020-07-29 ASSESSMENT — PAIN DESCRIPTION - ORIENTATION: ORIENTATION: RIGHT

## 2020-07-29 ASSESSMENT — ENCOUNTER SYMPTOMS
ABDOMINAL PAIN: 0
COUGH: 0
VOMITING: 1
SHORTNESS OF BREATH: 0
NAUSEA: 1

## 2020-07-29 ASSESSMENT — PAIN DESCRIPTION - PROGRESSION
CLINICAL_PROGRESSION: NOT CHANGED

## 2020-07-29 ASSESSMENT — PAIN DESCRIPTION - DESCRIPTORS: DESCRIPTORS: ACHING

## 2020-07-29 ASSESSMENT — PAIN - FUNCTIONAL ASSESSMENT: PAIN_FUNCTIONAL_ASSESSMENT: PREVENTS OR INTERFERES WITH MANY ACTIVE NOT PASSIVE ACTIVITIES

## 2020-07-29 ASSESSMENT — PAIN DESCRIPTION - FREQUENCY: FREQUENCY: INTERMITTENT

## 2020-07-29 ASSESSMENT — PAIN DESCRIPTION - LOCATION: LOCATION: FOOT

## 2020-07-29 NOTE — PROGRESS NOTES
Pharmacy Vancomycin Consult     Vancomycin Day: 3  Current Dosing: Vancomycin 1250 mg IV every 12 hours    Temp max:  97.7 F    Recent Labs     07/28/20  0506 07/29/20  0552   BUN 17 12       Recent Labs     07/28/20  0506 07/29/20  0552   CREATININE 0.79 0.74       Recent Labs     07/28/20  0506 07/29/20  0552   WBC 11.0 10.9         Intake/Output Summary (Last 24 hours) at 7/29/2020 0652  Last data filed at 7/29/2020 6629  Gross per 24 hour   Intake 2161.67 ml   Output 575 ml   Net 1586.67 ml       Culture Date      Source                       Results  7/28/20   blood   no growth 17hrs; pending  7/28/20   MRSA DNA Probe, nasal   in process    Ht Readings from Last 1 Encounters:   07/27/20 6' 1\" (1.854 m)        Wt Readings from Last 1 Encounters:   07/29/20 161 lb (73 kg)         Body mass index is 21.24 kg/m². Estimated Creatinine Clearance: 107 mL/min (based on SCr of 0.74 mg/dL). Trough: 17.2 mcg/ml 7/29/20 @0552    Assessment/Plan:  Vancomycin trough level is therapeutic. Continue Vancomycin 1250 mg IV every 12 hours. Thank you for the consult. Will continue to follow. Taya Carrillo  7/29/2020  7:04 AM

## 2020-07-29 NOTE — OP NOTE
[id number] [facility]                [address], [address] [place] [address]                                OPERATIVE REPORT    PATIENT NAME: Barrera Painting                  :        1955  MED REC NO:   [id number]                             ROOM:         ACCOUNT NO:   [de-identified]                           ADMIT DATE: 2020  PROVIDER:     Young Thomason MD    DATE OF PROCEDURE:  2020    PREOPERATIVE DIAGNOSIS:  Left hallux gangrene/PAD. POSTOPERATIVE DIAGNOSIS:  Left hallux gangrene/PAD. PROCEDURES PERFORMED:  1. Aortogram with selective left lower extremity runoff (third-order  selective). 2.  Left superficial femoral, popliteal, tibioperoneal trunk and  posterior tibial artery atherectomy with Jetstream 2.4 and 1.85 mm  catheters. 3.  Left popliteal, tibioperoneal trunk, and posterior tibial artery  angioplasty with [place] 3 mm x 220 mm balloon. 4.  Left superficial femoral, popliteal, and tibioperoneal trunk  drug-coated balloon angioplasty with 4 mm x 250 mm Admiral balloon. 5.  Left superficial femoral and popliteal artery DCB angioplasty with  Admiral 5 mm x 250 mm balloons x2.  6.  Left common and external iliac artery angioplasty with [place] 7 mm  x 40 mm balloon. 7.  Ultrasound-guided access, right common femoral artery. 8.  Placement of right femoral Mynx closure device. 9.  Conscious sedation. SURGEON:  Cassidy Dunlap. Dorothy Cedeño MD    ANESTHESIA:  Local with IV sedation. ESTIMATED BLOOD LOSS:  Minimal.    COMPLICATIONS:  None. INDICATIONS:  The patient is a 62-year-old diabetic male who previously  underwent a right below-knee amputation. He has developed gangrene of  the left hallux with known peripheral arterial disease. At this time,  he is being taken to the angiography suite for further evaluation and  therapy.     TECHNIQUE:  After informed consent had been obtained, the patient was  brought to the angiography suite, placed in the supine position, and  both groins were prepped and draped in usual sterile fashion. Conscious  sedation was then initiated by trained personnel using appropriate  monitoring equipment. The right groin was anesthetized with 1%  Xylocaine. Under ultrasound guidance, right common femoral artery was  isolated and a 5-Slov[place] sheath was placed. A 5-Slovak SOS Omni Flush  catheter was then advanced into the suprarenal aorta and using 50%  diluted Visipaque 320 contrast and digital imaging, abdominal  aortography was performed. Catheter was then withdrawn to the aortic  bifurcation and a pelvic angiography was performed in the PÉREZ  projection. Next, the left common iliac artery was cannulated. Catheter advanced into left common femoral artery. Selective left lower  extremity runoff views were obtained along with selective views  distally. This demonstrated a relatively normal aorta with widely  patent renal arteries bilaterally. There was a 70% stenosis at the  distal left side common iliac and proximal external iliac artery with  high-grade stenosis involving the internal iliac artery. Distally,  segmental high-grade stenosis were present within the left superficial  femoral, popliteal, tibioperoneal trunk and proximal posterior tibial  arteries consisting of greater than 80% stenosis. The anterior tibial  artery occludes shortly after its origin; however, reconstitutes via a  large collateral.  Peroneal artery is patent along with the posterior  tibial artery of which the posterior tibial artery has a dominant flow  to the left foot. At this time, a 7-Slovak [place] Destination sheath  was advanced and a 0.014 three-way guidewire was advanced into the left  posterior tibial artery down to the ankle level. Using a Jetstream 2.4  mm catheter, the superficial femoral and popliteal arteries were  atherectomized.   A 1.85 mm Jetstream catheter was then used to  atherectomize the tibioperoneal trunk and proximal posterior tibial  artery. A 3 mm x 220 mm [place] balloon was then used to angioplasty the  popliteal, tibioperoneal trunk, and posterior tibial artery followed by  a 4 mm x 250 mm Admiral balloon for the more proximal tibioperoneal  trunk and popliteal and distal superficial femoral artery followed a 5  mm x 250 mm drug-coated balloon to the superficial femoral and the  popliteal arteries along with a segment of the common femoral.   Completion angiography demonstrated a near complete resolution of the  previous stenosis with excellent flow into the left foot. A 7 mm x 40  mm [place] balloon was used to angioplasty the left iliac bifurcation  encompassing the distal common and proximal external iliac arteries. Completion angiography demonstrated complete resolution of the previous  70% stenosis. At this time, catheters and guidewires were withdrawn and  a Destination sheath was exchanged for a short 7-Togolese sheath. A  7-Togolese Mynx closure device was placed and manual pressure applied to  the right groin followed by a sterile dressing. The patient tolerated  the procedure well and there were no immediate complications. IMPRESSION:  1.  A 70% left distal common and proximal external iliac artery stenosis  with complete resolution postangioplasty. 2.  Multiple segmental high-grade stenosis involving the left  superficial femoral, popliteal, tibioperoneal trunk, and proximal  posterior tibial artery with resolution postatherectomy and DCB  angioplasty as noted above. 3.  Short segmental occlusion of the left anterior tibial artery with  good reconstitution through a large collateral.  Excellent flow was  noted into the left foot with dominant flow via the posterior tibial  artery. Above was discussed with the patient and he was restarted on Eliquis and  Plavix was added.         Nohemi Paz MD    D: 07/29/2020 11:56:41       T: 07/29/2020 12:44:42     [email or web address]  Job#: [id number]

## 2020-07-29 NOTE — POST SEDATION
Sedation Post Procedure Note    Patient Name: Pavan Campbell   YOB: 1955  Room/Bed: 2010/2010-02  Medical Record Number: [id number]  Date: 7/29/2020   Time: 11:46 AM         Physicians/Assistants: Arpita Rosenbaum MD    Procedure Performed:   Aortogram with selective left lower extremity runoff (third order selective)  Left superficial femoral, popliteal, tibial peroneal trunk and posterior tibial artery atherectomy with jetstream 2.4 and 1.85 mm atherectomy catheters  Left popliteal, tibial peroneal trunk and posterior tibial artery angioplasty with coyote 3 mm x 220 mm balloon  Left superficial femoral, popliteal and tibial peroneal trunk drug-coated balloon angioplasty with 4 mm x 250 mm Admiral balloon  Left superficial femoral and popliteal artery DCB angioplasty with Admiral 5 mm x 250 mm balloons x2  Left common and external iliac artery angioplasty with [place] 7 mm x 40 mm balloon  Ultrasound-guided access right common femoral artery  Placement of right femoral Mynx closure device  Conscious sedation    Post-Sedation Vital Signs:  Vitals:    07/29/20 1123   BP: 122/74   Pulse: 83   Resp: 16   Temp:    SpO2: 97%      Vital signs were reviewed and were stable after the procedure (see flow sheet for vitals)            Post-Sedation Exam: Lungs: clear to auscultation bilaterally and Cardiovascular: regular rate and rhythm           Complications: none    Electronically signed by Arpita Rosenbaum MD on 7/29/2020 at 11:46 AM

## 2020-07-29 NOTE — PROGRESS NOTES
Resting in bed; suppository given, patient initially refused this morning. Patient to report BM to writer.

## 2020-07-29 NOTE — PLAN OF CARE
Problem: Falls - Risk of:  Goal: Will remain free from falls  Description: Will remain free from falls  Outcome: Ongoing  Goal: Absence of physical injury  Description: Absence of physical injury  Outcome: Ongoing     Problem: Pain:  Description: Pain management should include both nonpharmacologic and pharmacologic interventions.   Goal: Pain level will decrease  Description: Pain level will decrease  Outcome: Ongoing  Goal: Control of acute pain  Description: Control of acute pain  Outcome: Ongoing  Goal: Control of chronic pain  Description: Control of chronic pain  Outcome: Ongoing     Problem: Serum Glucose Level - Abnormal:  Goal: Ability to maintain appropriate glucose levels will improve  Description: Ability to maintain appropriate glucose levels will improve  Outcome: Ongoing     Problem: Skin Integrity:  Goal: Will show no infection signs and symptoms  Description: Will show no infection signs and symptoms  Outcome: Ongoing  Goal: Absence of new skin breakdown  Description: Absence of new skin breakdown  Outcome: Ongoing
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
## 2020-07-29 NOTE — PROGRESS NOTES
Spoke to house supervisor Jossy Banuelos about Covid test being ordered to get it approved. He stated since we do not have a time for procedure tomorrow that Covid test will have to be done in the morning as a rapid in case procedure is not done.
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
## 2020-07-29 NOTE — PROGRESS NOTES
flush, potassium chloride **OR** potassium alternative oral replacement **OR** potassium chloride, magnesium sulfate, acetaminophen **OR** acetaminophen, polyethylene glycol, promethazine **OR** ondansetron, nicotine, glucose, dextrose, glucagon (rDNA), dextrose, diphenhydrAMINE, oxyCODONE-acetaminophen **OR** oxyCODONE-acetaminophen, calcium carbonate    Objective     Vitals:  Patient Vitals for the past 8 hrs:   BP Temp Temp src Pulse Resp SpO2 Weight   20 0400 -- -- -- -- -- -- 161 lb (73 kg)   20 0314 136/65 97.7 °F (36.5 °C) Oral 77 16 92 % --     Average, Min, and Max for last 24 hours Vitals:  TEMPERATURE:  Temp  Av °F (36.7 °C)  Min: 97.7 °F (36.5 °C)  Max: 98.6 °F (37 °C)    RESPIRATIONS RANGE: Resp  Av.2  Min: 16  Max: 17    PULSE RANGE: Pulse  Av.8  Min: 77  Max: 106    BLOOD PRESSURE RANGE:  Systolic (41XGU), OLO:062 , Min:128 , KIX:915   ; Diastolic (90TEG), TTR:92, Min:60, Max:72      PULSE OXIMETRY RANGE: SpO2  Av.3 %  Min: 92 %  Max: 98 %    I/O last 3 completed shifts:  In: -   Out: 525 [Urine:525]    CBC:  Recent Labs     20  05020  0552   WBC 12.5* 11.0 10.9   HGB 11.3* 11.4* 11.3*   HCT 37.6* 37.8* 36.2*    419 395   CRP 62.4*  --   --         BMP:  Recent Labs     20  0506 20  0552   * 139 144   K 4.9 4.3 4.5   CL 97* 99 103   CO2 27 29 30   BUN 26* 17 12   CREATININE 0.93 0.79 0.74   GLUCOSE 331* 112* 75   CALCIUM 8.9 9.0 8.7        Coags:  No results for input(s): APTT, PROT, INR in the last 72 hours. Lab Results   Component Value Date    SEDRATE 91 (H) 2020     Recent Labs     20  1615   CRP 62.4*       Lower Extremity Physical Exam:  Vascular: DP and PT pulses are nonpalpable. DP and PT monophasic on doppler (DP was very faint) CFT >5 seconds to all digits. Hair growth is absent to the level of the digits. No edema. Left leg is warm to cold of the left lower extremity. Left hallux  is cold to the touch.     Neuro: Saph/sural/SP/DP/plantar sensation  absent to light touch.       Musculoskeletal: Muscle strength testing was deferred due to patient's current state. Decreased range of motion noted to the ankle, limited range of motion of the first digit of the left foot. Gross deformity is left partial fifth ray resection and Right below the knee amputation      Dermatologic: Left hallux appears to have had a circumferential blister that has been deroofed. There is still some loose intact skin to the dorsal aspect of the hallux. Increased maceration on lateral aspect of left hallux. Erythema noted to the dorsum of the foot, appears to be improving, associated increase in warmth maintained. The right hallux appears darker than at the last physical exam, with increased skin necrosis at the level of the left hallux.     Full thickness ulceration #1 noted to the plantar medial aspect of the right 1st MT, measures 0.1 x 0.1 x 0.2. Periwound is hyperkeratotic, no erythema or drainage noted. No increase in warmth, fluctuance, sinus tracking, or induration appreciated     Wound on the plantar lateral aspect of the fourth ray appears with a dry stable eschar that is unstageable. No probe to bone no sinus tracking no undermining.     No purulence was appreciated or able to be expressed. There was a foul odor associated with the hallux. Clinical Images:                    Imaging:   XR FOOT LEFT (MIN 3 VIEWS)   Final Result   Soft tissue swelling of the 1st ray with some associated punctate radiopaque   foreign bodies. Resorption and/or amputation of the 5th ray some associated soft tissue   changes. IR ANGIOGRAM EXTREMITY LEFT    (Results Pending)       Cultures: none    Assessment   Zahraa Corrales is a 60 y.o. male with   1. Ischemic left hallux  2. Cellulitis, left foot  3. Jones grade 1 wound, left foot  4. Unstageable wound to 4th digit, left foot  5.  S/p partial

## 2020-07-29 NOTE — PLAN OF CARE
Problem: Falls - Risk of:  Goal: Will remain free from falls  Description: Will remain free from falls  7/29/2020 0115 by Morteza Rao RN  Outcome: Ongoing     Problem: Falls - Risk of:  Goal: Absence of physical injury  Description: Absence of physical injury  7/29/2020 0115 by Morteza Rao RN  Outcome: Ongoing     Problem: Pain:  Goal: Pain level will decrease  Description: Pain level will decrease  Outcome: Ongoing     Problem: Pain:  Goal: Control of acute pain  Description: Control of acute pain  Outcome: Ongoing     Problem: Pain:  Goal: Control of chronic pain  Description: Control of chronic pain  Outcome: Ongoing     Problem: Serum Glucose Level - Abnormal:  Goal: Ability to maintain appropriate glucose levels will improve  Description: Ability to maintain appropriate glucose levels will improve  Outcome: Ongoing

## 2020-07-29 NOTE — PROGRESS NOTES
Have re-dressed R knee amputation wound three time so far tonight. Pt refusing to have it re dressed because he can not put his prosthetic leg on with having a dressing on the leg. Patient also dry heaving, but refusing any Zofran. Pt states he can only take morphine for the pain because the percocet makes his nauseous. NP Nickolasee notified.

## 2020-07-29 NOTE — CARE COORDINATION
Pt had aortogram with lt iliac artery angioplasty today per Dr. Katie Vallejo. Pt requesting [facility] for home care. Referral called to Yuliya Workman at [facility]. The Plan for Transition of Care is related to the following treatment goals: Skilled nursing, PT/OT for strengthening and increase mobility with ambulation    The Patient was provided with a choice of provider and agrees   with the discharge plan. [x] Yes [] No    Freedom of choice list was provided with basic dialogue that supports the patient's individualized plan of care/goals, treatment preferences and shares the quality data associated with the providers. [x] Yes [] No     LOREE initiated.

## 2020-07-29 NOTE — PROGRESS NOTES
The patient returns to the room from PACU; awake and alert and asking for pain medications. Patient was instructed by PACU RN that he is on bedrest until 1745 tonight.

## 2020-07-30 PROBLEM — D64.9 ANEMIA, NORMOCYTIC NORMOCHROMIC: Status: ACTIVE | Noted: 2020-07-30

## 2020-07-30 LAB
ABSOLUTE EOS #: 0.35 K/UL (ref 0–0.44)
ABSOLUTE IMMATURE GRANULOCYTE: 0.07 K/UL (ref 0–0.3)
ABSOLUTE LYMPH #: 1.84 K/UL (ref 1.1–3.7)
ABSOLUTE MONO #: 1.23 K/UL (ref 0.1–1.2)
ANION GAP SERPL CALCULATED.3IONS-SCNC: 10 MMOL/L (ref 9–17)
BASOPHILS # BLD: 1 % (ref 0–2)
BASOPHILS ABSOLUTE: 0.06 K/UL (ref 0–0.2)
BUN BLDV-MCNC: 16 MG/DL (ref 8–23)
BUN/CREAT BLD: 22 (ref 9–20)
CALCIUM SERPL-MCNC: 8.5 MG/DL (ref 8.6–10.4)
CHLORIDE BLD-SCNC: 105 MMOL/L (ref 98–107)
CO2: 27 MMOL/L (ref 20–31)
CREAT SERPL-MCNC: 0.74 MG/DL (ref 0.7–1.2)
DIFFERENTIAL TYPE: ABNORMAL
EOSINOPHILS RELATIVE PERCENT: 3 % (ref 1–4)
GFR AFRICAN AMERICAN: >60 ML/MIN
GFR NON-AFRICAN AMERICAN: >60 ML/MIN
GFR SERPL CREATININE-BSD FRML MDRD: ABNORMAL ML/MIN/{1.73_M2}
GFR SERPL CREATININE-BSD FRML MDRD: ABNORMAL ML/MIN/{1.73_M2}
GLUCOSE BLD-MCNC: 104 MG/DL (ref 75–110)
GLUCOSE BLD-MCNC: 222 MG/DL (ref 75–110)
GLUCOSE BLD-MCNC: 238 MG/DL (ref 75–110)
GLUCOSE BLD-MCNC: 73 MG/DL (ref 70–99)
GLUCOSE BLD-MCNC: 84 MG/DL (ref 75–110)
HCT VFR BLD CALC: 33.4 % (ref 40.7–50.3)
HEMOGLOBIN: 10.1 G/DL (ref 13–17)
IMMATURE GRANULOCYTES: 1 %
LYMPHOCYTES # BLD: 17 % (ref 24–43)
MCH RBC QN AUTO: 29.7 PG (ref 25.2–33.5)
MCHC RBC AUTO-ENTMCNC: 30.2 G/DL (ref 28.4–34.8)
MCV RBC AUTO: 98.2 FL (ref 82.6–102.9)
MONOCYTES # BLD: 11 % (ref 3–12)
NRBC AUTOMATED: 0 PER 100 WBC
PDW BLD-RTO: 12.3 % (ref 11.8–14.4)
PLATELET # BLD: 373 K/UL (ref 138–453)
PLATELET ESTIMATE: ABNORMAL
PMV BLD AUTO: 9.3 FL (ref 8.1–13.5)
POTASSIUM SERPL-SCNC: 3.9 MMOL/L (ref 3.7–5.3)
RBC # BLD: 3.4 M/UL (ref 4.21–5.77)
RBC # BLD: ABNORMAL 10*6/UL
SEG NEUTROPHILS: 67 % (ref 36–65)
SEGMENTED NEUTROPHILS ABSOLUTE COUNT: 7.21 K/UL (ref 1.5–8.1)
SODIUM BLD-SCNC: 142 MMOL/L (ref 135–144)
WBC # BLD: 10.8 K/UL (ref 3.5–11.3)
WBC # BLD: ABNORMAL 10*3/UL

## 2020-07-30 PROCEDURE — 1200000000 HC SEMI PRIVATE

## 2020-07-30 PROCEDURE — 6370000000 HC RX 637 (ALT 250 FOR IP): Performed by: SURGERY

## 2020-07-30 PROCEDURE — 2580000003 HC RX 258: Performed by: SURGERY

## 2020-07-30 PROCEDURE — 99232 SBSQ HOSP IP/OBS MODERATE 35: CPT | Performed by: INTERNAL MEDICINE

## 2020-07-30 PROCEDURE — 82947 ASSAY GLUCOSE BLOOD QUANT: CPT

## 2020-07-30 PROCEDURE — 80048 BASIC METABOLIC PNL TOTAL CA: CPT

## 2020-07-30 PROCEDURE — 85025 COMPLETE CBC W/AUTO DIFF WBC: CPT

## 2020-07-30 PROCEDURE — 6360000002 HC RX W HCPCS: Performed by: SURGERY

## 2020-07-30 PROCEDURE — 6370000000 HC RX 637 (ALT 250 FOR IP): Performed by: INTERNAL MEDICINE

## 2020-07-30 PROCEDURE — 36415 COLL VENOUS BLD VENIPUNCTURE: CPT

## 2020-07-30 RX ORDER — INSULIN GLARGINE 100 [IU]/ML
70 INJECTION, SOLUTION SUBCUTANEOUS NIGHTLY
Status: DISCONTINUED | OUTPATIENT
Start: 2020-07-30 | End: 2020-08-01

## 2020-07-30 RX ORDER — GUAIFENESIN 600 MG/1
600 TABLET, EXTENDED RELEASE ORAL 2 TIMES DAILY
Status: DISCONTINUED | OUTPATIENT
Start: 2020-07-30 | End: 2020-08-03 | Stop reason: HOSPADM

## 2020-07-30 RX ORDER — MAGNESIUM HYDROXIDE/ALUMINUM HYDROXICE/SIMETHICONE 120; 1200; 1200 MG/30ML; MG/30ML; MG/30ML
30 SUSPENSION ORAL EVERY 6 HOURS PRN
Status: DISCONTINUED | OUTPATIENT
Start: 2020-07-30 | End: 2020-08-03 | Stop reason: HOSPADM

## 2020-07-30 RX ORDER — BENZONATATE 100 MG/1
100 CAPSULE ORAL 3 TIMES DAILY PRN
Status: DISCONTINUED | OUTPATIENT
Start: 2020-07-30 | End: 2020-08-03 | Stop reason: HOSPADM

## 2020-07-30 RX ADMIN — INSULIN LISPRO 6 UNITS: 100 INJECTION, SOLUTION INTRAVENOUS; SUBCUTANEOUS at 17:01

## 2020-07-30 RX ADMIN — HYDROCODONE BITARTRATE AND ACETAMINOPHEN 2 TABLET: 5; 325 TABLET ORAL at 14:58

## 2020-07-30 RX ADMIN — ANTACID TABLETS 1000 MG: 500 TABLET, CHEWABLE ORAL at 00:14

## 2020-07-30 RX ADMIN — SODIUM CHLORIDE 3 G: 900 INJECTION INTRAVENOUS at 23:24

## 2020-07-30 RX ADMIN — INSULIN GLARGINE 25 UNITS: 100 INJECTION, SOLUTION SUBCUTANEOUS at 08:18

## 2020-07-30 RX ADMIN — APIXABAN 5 MG: 5 TABLET, FILM COATED ORAL at 08:18

## 2020-07-30 RX ADMIN — PANTOPRAZOLE SODIUM 40 MG: 40 TABLET, DELAYED RELEASE ORAL at 06:57

## 2020-07-30 RX ADMIN — ATORVASTATIN CALCIUM 40 MG: 40 TABLET, FILM COATED ORAL at 21:17

## 2020-07-30 RX ADMIN — MORPHINE SULFATE 4 MG: 4 INJECTION, SOLUTION INTRAMUSCULAR; INTRAVENOUS at 00:25

## 2020-07-30 RX ADMIN — HYDROCODONE BITARTRATE AND ACETAMINOPHEN 2 TABLET: 5; 325 TABLET ORAL at 18:42

## 2020-07-30 RX ADMIN — HYDROCODONE BITARTRATE AND ACETAMINOPHEN 2 TABLET: 5; 325 TABLET ORAL at 08:18

## 2020-07-30 RX ADMIN — INSULIN LISPRO 3 UNITS: 100 INJECTION, SOLUTION INTRAVENOUS; SUBCUTANEOUS at 21:16

## 2020-07-30 RX ADMIN — SODIUM CHLORIDE: 4.5 INJECTION, SOLUTION INTRAVENOUS at 04:17

## 2020-07-30 RX ADMIN — MORPHINE SULFATE 4 MG: 4 INJECTION, SOLUTION INTRAMUSCULAR; INTRAVENOUS at 06:57

## 2020-07-30 RX ADMIN — MORPHINE SULFATE 2 MG: 2 INJECTION, SOLUTION INTRAMUSCULAR; INTRAVENOUS at 16:15

## 2020-07-30 RX ADMIN — SODIUM CHLORIDE 3 G: 900 INJECTION INTRAVENOUS at 04:17

## 2020-07-30 RX ADMIN — HYDROCODONE BITARTRATE AND ACETAMINOPHEN 2 TABLET: 5; 325 TABLET ORAL at 23:23

## 2020-07-30 RX ADMIN — VANCOMYCIN HYDROCHLORIDE 1250 MG: 1.25 INJECTION, POWDER, LYOPHILIZED, FOR SOLUTION INTRAVENOUS at 06:58

## 2020-07-30 RX ADMIN — MORPHINE SULFATE 4 MG: 4 INJECTION, SOLUTION INTRAMUSCULAR; INTRAVENOUS at 19:55

## 2020-07-30 RX ADMIN — INSULIN GLARGINE 70 UNITS: 100 INJECTION, SOLUTION SUBCUTANEOUS at 21:16

## 2020-07-30 RX ADMIN — MORPHINE SULFATE 4 MG: 4 INJECTION, SOLUTION INTRAMUSCULAR; INTRAVENOUS at 02:51

## 2020-07-30 RX ADMIN — SODIUM CHLORIDE 3 G: 900 INJECTION INTRAVENOUS at 14:59

## 2020-07-30 RX ADMIN — VANCOMYCIN HYDROCHLORIDE 1250 MG: 1.25 INJECTION, POWDER, LYOPHILIZED, FOR SOLUTION INTRAVENOUS at 18:41

## 2020-07-30 RX ADMIN — ASPIRIN 81 MG 81 MG: 81 TABLET ORAL at 08:18

## 2020-07-30 RX ADMIN — APIXABAN 5 MG: 5 TABLET, FILM COATED ORAL at 21:17

## 2020-07-30 RX ADMIN — POLYETHYLENE GLYCOL (3350) 17 G: 17 POWDER, FOR SOLUTION ORAL at 21:17

## 2020-07-30 RX ADMIN — CLOPIDOGREL BISULFATE 75 MG: 75 TABLET ORAL at 08:18

## 2020-07-30 RX ADMIN — GUAIFENESIN 600 MG: 600 TABLET, EXTENDED RELEASE ORAL at 21:17

## 2020-07-30 RX ADMIN — DIPHENHYDRAMINE HCL 25 MG: 25 TABLET ORAL at 00:41

## 2020-07-30 RX ADMIN — HYDROCODONE BITARTRATE AND ACETAMINOPHEN 2 TABLET: 5; 325 TABLET ORAL at 04:17

## 2020-07-30 ASSESSMENT — PAIN - FUNCTIONAL ASSESSMENT
PAIN_FUNCTIONAL_ASSESSMENT: ACTIVITIES ARE NOT PREVENTED
PAIN_FUNCTIONAL_ASSESSMENT: PREVENTS OR INTERFERES SOME ACTIVE ACTIVITIES AND ADLS

## 2020-07-30 ASSESSMENT — PAIN DESCRIPTION - PAIN TYPE
TYPE: ACUTE PAIN
TYPE: ACUTE PAIN

## 2020-07-30 ASSESSMENT — PAIN DESCRIPTION - FREQUENCY
FREQUENCY: CONTINUOUS
FREQUENCY: INTERMITTENT

## 2020-07-30 ASSESSMENT — ENCOUNTER SYMPTOMS
ABDOMINAL PAIN: 0
COUGH: 1
SHORTNESS OF BREATH: 0
VOMITING: 1
NAUSEA: 1

## 2020-07-30 ASSESSMENT — PAIN DESCRIPTION - ONSET
ONSET: ON-GOING
ONSET: ON-GOING

## 2020-07-30 ASSESSMENT — PAIN DESCRIPTION - DESCRIPTORS
DESCRIPTORS: ACHING;DISCOMFORT
DESCRIPTORS: ACHING

## 2020-07-30 ASSESSMENT — PAIN SCALES - GENERAL
PAINLEVEL_OUTOF10: 0
PAINLEVEL_OUTOF10: 8
PAINLEVEL_OUTOF10: 0
PAINLEVEL_OUTOF10: 7
PAINLEVEL_OUTOF10: 0
PAINLEVEL_OUTOF10: 0
PAINLEVEL_OUTOF10: 8
PAINLEVEL_OUTOF10: 10
PAINLEVEL_OUTOF10: 9
PAINLEVEL_OUTOF10: 7
PAINLEVEL_OUTOF10: 7
PAINLEVEL_OUTOF10: 8
PAINLEVEL_OUTOF10: 8
PAINLEVEL_OUTOF10: 0
PAINLEVEL_OUTOF10: 4
PAINLEVEL_OUTOF10: 7
PAINLEVEL_OUTOF10: 5
PAINLEVEL_OUTOF10: 8
PAINLEVEL_OUTOF10: 0

## 2020-07-30 ASSESSMENT — PAIN DESCRIPTION - LOCATION
LOCATION: GENERALIZED
LOCATION: LEG

## 2020-07-30 ASSESSMENT — PAIN DESCRIPTION - PROGRESSION
CLINICAL_PROGRESSION: GRADUALLY WORSENING
CLINICAL_PROGRESSION: NOT CHANGED

## 2020-07-30 ASSESSMENT — PAIN DESCRIPTION - ORIENTATION: ORIENTATION: LEFT
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
## 2020-07-30 NOTE — PROGRESS NOTES
Pt. Yelling at writer that we will not charge phone. Writer attempted to explain that we cannot charge due to contamination issues. , pt yelling and stating \" I am having so much trouble with you\". Writer has had no issues with pt. . Lead Rn to talk to patient.
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
## 2020-07-30 NOTE — PROGRESS NOTES
VASCULAR SURGERY  PROGRESS NOTE      7/30/2020 5:39 PM  Subjective:   Admit Date: 7/27/2020  PCP: Onofre Heart DO    Chief Complaint   Patient presents with    Leg Pain    Foot Pain     left     Interval History: No complaints. Patient states left leg feels much better. Diet: DIET GENERAL;    Medications:   Scheduled Meds:   guaiFENesin  600 mg Oral BID    insulin glargine  70 Units Subcutaneous Nightly    ampicillin-sulbactam  3 g Intravenous Q6H    clopidogrel  75 mg Oral Daily    pantoprazole  40 mg Oral QAM AC    sodium chloride flush  10 mL Intravenous 2 times per day    atorvastatin  40 mg Oral Nightly    [Held by provider] metFORMIN  500 mg Oral BID WC    aspirin  81 mg Oral Daily    apixaban  5 mg Oral BID    insulin lispro  0-18 Units Subcutaneous TID WC    insulin lispro  0-9 Units Subcutaneous Nightly    vancomycin  1,250 mg Intravenous Q12H    vancomycin (VANCOCIN) intermittent dosing (placeholder)   Other RX Placeholder    fluticasone  2 spray Each Nostril Daily     Continuous Infusions:   sodium chloride 75 mL/hr at 07/30/20 0417    sodium chloride 50 mL/hr at 07/28/20 1953    dextrose           Labs:   CBC:   Recent Labs     07/28/20  0506 07/29/20  0552 07/30/20  0526   WBC 11.0 10.9 10.8   HGB 11.4* 11.3* 10.1*    395 373     BMP:    Recent Labs     07/28/20  0506 07/29/20  0552 07/30/20  0526    144 142   K 4.3 4.5 3.9   CL 99 103 105   CO2 29 30 27   BUN 17 12 16   CREATININE 0.79 0.74 0.74   GLUCOSE 112* 75 73     Hepatic: No results for input(s): AST, ALT, ALB, BILITOT, ALKPHOS in the last 72 hours. Troponin: Invalid input(s): TROPONIN  BNP: No results for input(s): BNP in the last 72 hours. Lipids: No results for input(s): CHOL, HDL in the last 72 hours.     Invalid input(s): LDLCALCU  INR:   Recent Labs     07/29/20  0923   INR 1.0       Objective:   Vitals: BP (!) 162/82   Pulse 102   Temp 98.1 °F (36.7 °C)   Resp 16   Ht 6' 1\" (1.854 m) Wt 160 lb 8 oz (72.8 kg)   SpO2 95%   BMI 21.18 kg/m²   General appearance: alert, cooperative and no distress  Mental Status: oriented to person, place and time with normal affect  Neck: good carotid pulses, no JVD  Lungs: clear to auscultation bilaterally, normal effort  Heart: regular rate and rhythm, no murmur,  Abdomen: soft, non-tender, non-distended, bowel sounds present all four quadrants, no masses, hepatomegaly, splenomegaly or aortic enlargement  Extremities: Left hallux gangrene, palpable left posterior tibial pulse, right below-knee amputation  Skin: no gross lesions, rashes, or induration    Assessment:   3 62-year-old diabetic male doing well status post left lower extremity atherectomy and DCB angioplasty    Patient Active Problem List:     Type 2 diabetes mellitus with diabetic polyneuropathy, with long-term current use of insulin ([facility])     Neuropathic pain, leg     Diabetic polyneuropathy ([facility])     Allergic rhinitis     Tobacco dependence     COPD (chronic obstructive pulmonary disease) ([facility])     Edentulous     Dysphagia     Lung nodules     Esophageal cancer ([facility])     Fracture of humerus, left, closed     Closed fracture of humerus     DM type 2 with diabetic peripheral neuropathy ([facility])     Chronic obstructive pulmonary disease (HCC)     Dyslipidemia     Gastroesophageal reflux disease     Chronic pain syndrome     Marijuana use     History of colon polyps     Cellulitis of right heel     PVD (peripheral vascular disease) ([facility])     Functional diarrhea     Sepsis due to methicillin resistant Staphylococcus aureus (MRSA) ([facility])     History of esophageal cancer     Osteomyelitis, chronic, ankle or foot ([facility])     Peripheral artery disease ([facility] [address]     Other pulmonary embolism without acute cor pulmonale ([facility])     Carotid stenosis, asymptomatic, bilateral     Lower limb amputation status     Fx humeral neck, right, closed, initial encounter     Transient hypotension     Constipation due to opioid therapy     Acute kidney injury ([address]     Diabetic ulcer of left midfoot associated with type 2 diabetes mellitus, with fat layer exposed ([address]     Complication of below knee amputation stump (HCC)     COPD exacerbation (HCC)     Hyponatremia     Pain, phantom limb ([address]     Below-knee amputation of right lower extremity (HCC)     Hyperglycemia     Moderate protein malnutrition ([address]     Essential hypertension     Uncontrolled type 2 diabetes mellitus with hyperglycemia ([address]     History of pulmonary embolism - 2017     Atelectasis     Uncontrolled type 2 diabetes mellitus with foot ulcer (HCC)     Azotemia     Anemia, normocytic normochromic      Plan:   1. Lower extremity PVR study status post atherectomy  2. Continue antibiotics  3.  Will likely need left hallux amputation    Electronically signed by Wanda Schaefer MD on 7/30/2020 at 5:39 PM

## 2020-07-30 NOTE — PLAN OF CARE
Problem: Falls - Risk of:  Goal: Will remain free from falls  Description: Will remain free from falls  7/30/2020 0543 by Chelsy Bynum RN  Outcome: Ongoing  Note: Siderails up x 2  Hourly rounding  Call light in reach  Instructed to call for assist before attempting out of bed. Remains free from falls and accidental injury at this time   Floor free from obstacles  Bed is locked and in lowest position  Adequate lighting provided  Bed alarm on, Red Falling star and Stay with Me signs posted  Will continue to monitor. Problem: Pain:  Goal: Control of acute pain  Description: Control of acute pain  7/30/2020 0543 by Chelsy Bynum RN  Outcome: Ongoing  Note: Pain level assessment complete. Patient educated on pain scale and control interventions  Norco Q4hrs PRN pain medication given per patient request.  Morphine IV PRN. Pt reminded that po must be used before IV. Patient instructed to call out with new onset of pain or unrelieved pain  Will continue to monitor.      Problem: Serum Glucose Level - Abnormal:  Goal: Ability to maintain appropriate glucose levels will improve  Description: Ability to maintain appropriate glucose levels will improve  7/30/2020 0543 by Chelsy Bynum RN  Outcome: Ongoing     Problem: Skin Integrity:  Goal: Will show no infection signs and symptoms  Description: Will show no infection signs and symptoms  7/30/2020 0543 by Chelsy Bynum RN  Outcome: Ongoing

## 2020-07-30 NOTE — PROGRESS NOTES
Pt. Refusing to wear stump  because he does not want to do any standing. Podiatry advised him that he can bear weight on that leg with a ortho shoe on.

## 2020-07-30 NOTE — PROGRESS NOTES
Dressing changed to right knee. Site pink and moist Silver Aquacel to site with kerlix wrap. Podiatry changed left foot dressing. Ortho shoe in room for pt to use when pt is up.

## 2020-07-30 NOTE — PROGRESS NOTES
Progress Note  Podiatric Medicine and Surgery     Subjective     CC: Left toe wound    Patient seen and examined at bedside,  Afebrile, hypertensive and tachycardic  S/p LLE arteriogram  Denies any N/V/F/C/CP/SOB      HPI :  Tracy Bosch is a 60 y.o. male seen at [facility] emergency department. Patient states that he was walking around in his shoe, when he noticed that it was bleeding through. Patient reports that he usually only wear socks, because he knows that this can be a problem. Patient has a history of a right BKA, left fifth ray resection. Patient also has a history of uncontrolled diabetes. Patient reported that he has had his blood sugars ranging between 200 and 600. Patient also has a well-known history of poor compliance, and leaving the emergency department AMA. Patient does not have a podiatrist he follows with. Patient denies any other pedal complaints       ROS: Denies N/V/F/C/SOB/CP. Otherwise negative except at stated in the HPI.      Medications:  Scheduled Meds:   ampicillin-sulbactam  3 g Intravenous Q6H    clopidogrel  75 mg Oral Daily    Insulin Degludec  70 Units Subcutaneous Nightly    pantoprazole  40 mg Oral QAM AC    sodium chloride flush  10 mL Intravenous 2 times per day    insulin glargine  25 Units Subcutaneous BID    atorvastatin  40 mg Oral Nightly    [Held by provider] metFORMIN  500 mg Oral BID WC    aspirin  81 mg Oral Daily    apixaban  5 mg Oral BID    insulin lispro  0-18 Units Subcutaneous TID WC    insulin lispro  0-9 Units Subcutaneous Nightly    vancomycin  1,250 mg Intravenous Q12H    vancomycin (VANCOCIN) intermittent dosing (placeholder)   Other RX Placeholder    fluticasone  2 spray Each Nostril Daily       Continuous Infusions:   sodium chloride 75 mL/hr at 07/30/20 0417    sodium chloride 50 mL/hr at 07/28/20 1953    dextrose         PRN Meds:bisacodyl, acetaminophen, HYDROcodone 5 mg - acetaminophen **OR** HYDROcodone 5 mg - acetaminophen, morphine **OR** morphine, ondansetron, ipratropium-albuterol, hydrALAZINE, sodium chloride flush, potassium chloride **OR** potassium alternative oral replacement **OR** potassium chloride, magnesium sulfate, polyethylene glycol, promethazine **OR** [DISCONTINUED] ondansetron, nicotine, glucose, dextrose, glucagon (rDNA), dextrose, diphenhydrAMINE, calcium carbonate    Objective     Vitals:  Patient Vitals for the past 8 hrs:   BP Temp Temp src Pulse Resp SpO2 Weight   20 0826 (!) 162/82 98.1 °F (36.7 °C) -- 102 16 95 % --   20 0426 (!) 146/67 98.2 °F (36.8 °C) Oral 98 16 94 % --   20 0418 -- -- -- -- -- -- 160 lb 8 oz (72.8 kg)     Average, Min, and Max for last 24 hours Vitals:  TEMPERATURE:  Temp  Av.8 °F (36.6 °C)  Min: 97 °F (36.1 °C)  Max: 98.2 °F (36.8 °C)    RESPIRATIONS RANGE: Resp  Av.7  Min: 12  Max: 25    PULSE RANGE: Pulse  Av.2  Min: 77  Max: 113    BLOOD PRESSURE RANGE:  Systolic (39DPB), RRS:364 , Min:106 , [place]:566   ; Diastolic (19EBF), VXY:82, Min:58, Max:100      PULSE OXIMETRY RANGE: SpO2  Av.4 %  Min: 92 %  Max: 100 %    I/O last 3 completed shifts: In: 1138 [P.O.:740; I.V.:398]  Out: 1175 [Urine:1175]    CBC:  Recent Labs     20  1615 20  0506 20  0552 20  0526   WBC 12.5* 11.0 10.9 10.8   HGB 11.3* 11.4* 11.3* 10.1*   HCT 37.6* 37.8* 36.2* 33.4*    419 395 373   CRP 62.4*  --  53.5*  --         BMP:  Recent Labs     20  0506 20  0552 20  0526    144 142   K 4.3 4.5 3.9   CL 99 103 105   CO2 29 30 27   BUN 17 12 16   CREATININE 0.79 0.74 0.74   GLUCOSE 112* 75 73   CALCIUM 9.0 8.7 8.5*        Coags:  Recent Labs     20  0923   INR 1.0       Lab Results   Component Value Date    SEDRATE 91 (H) 2020     Recent Labs     20  1615 20  0552   CRP 62.4* 53.5*       Lower Extremity Physical Exam:  Vascular: Hair growth is absent to the level of the digits. No edema. Left leg is warm to cold of the left lower extremity. Left hallux is cold to the touch.     Neuro: Saph/sural/SP/DP/plantar sensation  absent to light touch.       Musculoskeletal: Muscle strength testing was deferred due to patient's current state. Decreased range of motion noted to the ankle Gross deformity is left partial fifth ray resection and Right below the knee amputation      Dermatologic: Left hallux is necrotic circumferentially with dorsal central area of viable skin. Increased maceration on lateral aspect of left hallux. Erythema noted proximal to the hallux to the dorsum of the foot with associated increase in warmth. Some malodor still present     Full thickness ulceration #1 noted to the plantar medial aspect of the right 1st MT, measures 0.1 x 0.1 x 0.2. Periwound is hyperkeratotic, no erythema or drainage noted. No increase in warmth, fluctuance, sinus tracking, or induration appreciated     Wound on the plantar lateral aspect of the fourth ray appears with a dry stable eschar that is unstageable. No probe to bone no sinus tracking no undermining. Clinical Images:                    Imaging:   IR ANGIOGRAM EXTREMITY LEFT   Final Result      XR FOOT LEFT (MIN 3 VIEWS)   Final Result   Soft tissue swelling of the 1st ray with some associated punctate radiopaque   foreign bodies. Resorption and/or amputation of the 5th ray some associated soft tissue   changes. Cultures: none    Assessment   Zoraida Edmondson is a 60 y.o. male with   1. Dry gangrene, left hallux  2. Cellulitis, left foot  3. Jones grade 1 wound, left foot  4. Unstageable wound to 4th digit, left foot  5. S/p partial 5th ray amputation, left foot  6. S/p Right BKA  7. PAD  8.  Uncontrolled type 2 diabetes    Principal Problem:    Uncontrolled type 2 diabetes mellitus with foot ulcer ([address]  Active Problems:    Tobacco dependence    COPD (chronic obstructive pulmonary disease) (HCC)    Dyslipidemia    Peripheral artery disease ([facility]    Essential hypertension    Azotemia  Resolved Problems:    * No resolved hospital problems. *       Plan     · Patient examined and evaluated at bedside   · Treatment options discussed in detail with the patient  · Medical management per primary  · Abx: Vancomycin/Zosyn, ID consulted, appreciate recs  · Patient underwent angiogram yesterday by vascular. Flow established to posterior tib and anterior tib arteries. Appreciate recs. · Discussed with patient of treatment options. Explained to patient that due to the extent of necrosis that a proximal amputation is recommended but he does have a chance of healing since blood flow was reestablished. Patient is currently refusing amputation or any other surgical intervention. Informed patient that he is at a risk for the infection to worsen which may lead to a more proximal surgical intervention. Pt confirmed understanding but would like some time to think it over. · Dressings applied to left leg include: Betadine wet-to-dry, light Kerlix. · WBAT to the left foot in a surgical shoe  · Discussed with Dr. Marlo Cummings DPM   Podiatric Medicine & Surgery   7/30/2020 at 9:22 AM     Senior Resident Statement:  I have discussed the case, including pertinent history and exam findings with the resident. I agree with the assessment, plan and orders as documented by the intern. Any changes were made in the note above.        Electronically signed by Jodie Smalls DPM on 7/30/2020 at 9:54 AM

## 2020-07-30 NOTE — PROGRESS NOTES
Writer assumed care of patient from [facility]. Pt is stable, resting in bed, watching TV, and speaking to his mother on the phone.    STAY WITH ME protocol in place  Bed/Chair alarm on  Signs in place  Fall risk sticker to wrist band  Non-skid socks on/at bedside  Adequate lighting provided  Room/floor free of clutter  Bed low and locked  Call light in reach

## 2020-07-31 ENCOUNTER — APPOINTMENT (OUTPATIENT)
Dept: MRI IMAGING | Age: 65
DRG: 271 | End: 2020-07-31
Payer: MEDICARE

## 2020-07-31 PROBLEM — M86.9 OSTEOMYELITIS OF FOURTH TOE OF LEFT FOOT (HCC): Status: ACTIVE | Noted: 2020-07-31

## 2020-07-31 LAB
ABSOLUTE EOS #: 0.49 K/UL (ref 0–0.44)
ABSOLUTE IMMATURE GRANULOCYTE: 0.1 K/UL (ref 0–0.3)
ABSOLUTE LYMPH #: 2.12 K/UL (ref 1.1–3.7)
ABSOLUTE MONO #: 1.22 K/UL (ref 0.1–1.2)
ANION GAP SERPL CALCULATED.3IONS-SCNC: 8 MMOL/L (ref 9–17)
BASOPHILS # BLD: 1 % (ref 0–2)
BASOPHILS ABSOLUTE: 0.06 K/UL (ref 0–0.2)
BUN BLDV-MCNC: 16 MG/DL (ref 8–23)
BUN/CREAT BLD: 21 (ref 9–20)
C-REACTIVE PROTEIN: 64.5 MG/L (ref 0–5)
CALCIUM SERPL-MCNC: 8.8 MG/DL (ref 8.6–10.4)
CHLORIDE BLD-SCNC: 105 MMOL/L (ref 98–107)
CO2: 29 MMOL/L (ref 20–31)
CREAT SERPL-MCNC: 0.76 MG/DL (ref 0.7–1.2)
DIFFERENTIAL TYPE: ABNORMAL
EOSINOPHILS RELATIVE PERCENT: 4 % (ref 1–4)
GFR AFRICAN AMERICAN: >60 ML/MIN
GFR NON-AFRICAN AMERICAN: >60 ML/MIN
GFR SERPL CREATININE-BSD FRML MDRD: ABNORMAL ML/MIN/{1.73_M2}
GFR SERPL CREATININE-BSD FRML MDRD: ABNORMAL ML/MIN/{1.73_M2}
GLUCOSE BLD-MCNC: 138 MG/DL (ref 75–110)
GLUCOSE BLD-MCNC: 226 MG/DL (ref 75–110)
GLUCOSE BLD-MCNC: 244 MG/DL (ref 75–110)
GLUCOSE BLD-MCNC: 289 MG/DL (ref 75–110)
GLUCOSE BLD-MCNC: 309 MG/DL (ref 75–110)
GLUCOSE BLD-MCNC: 57 MG/DL (ref 75–110)
GLUCOSE BLD-MCNC: 64 MG/DL (ref 70–99)
HCT VFR BLD CALC: 35.5 % (ref 40.7–50.3)
HEMOGLOBIN: 10.7 G/DL (ref 13–17)
IMMATURE GRANULOCYTES: 1 %
LYMPHOCYTES # BLD: 17 % (ref 24–43)
MCH RBC QN AUTO: 29.6 PG (ref 25.2–33.5)
MCHC RBC AUTO-ENTMCNC: 30.1 G/DL (ref 28.4–34.8)
MCV RBC AUTO: 98.3 FL (ref 82.6–102.9)
MONOCYTES # BLD: 10 % (ref 3–12)
MRSA, DNA, NASAL: NORMAL
NRBC AUTOMATED: 0 PER 100 WBC
PDW BLD-RTO: 12.3 % (ref 11.8–14.4)
PLATELET # BLD: 456 K/UL (ref 138–453)
PLATELET ESTIMATE: ABNORMAL
PMV BLD AUTO: 8.9 FL (ref 8.1–13.5)
POTASSIUM SERPL-SCNC: 4.3 MMOL/L (ref 3.7–5.3)
RBC # BLD: 3.61 M/UL (ref 4.21–5.77)
RBC # BLD: ABNORMAL 10*6/UL
SEG NEUTROPHILS: 67 % (ref 36–65)
SEGMENTED NEUTROPHILS ABSOLUTE COUNT: 8.86 K/UL (ref 1.5–8.1)
SODIUM BLD-SCNC: 142 MMOL/L (ref 135–144)
SPECIMEN DESCRIPTION: NORMAL
WBC # BLD: 12.9 K/UL (ref 3.5–11.3)
WBC # BLD: ABNORMAL 10*3/UL

## 2020-07-31 PROCEDURE — 6360000004 HC RX CONTRAST MEDICATION: Performed by: PODIATRIST

## 2020-07-31 PROCEDURE — 6360000002 HC RX W HCPCS: Performed by: SURGERY

## 2020-07-31 PROCEDURE — 82947 ASSAY GLUCOSE BLOOD QUANT: CPT

## 2020-07-31 PROCEDURE — 2580000003 HC RX 258: Performed by: SURGERY

## 2020-07-31 PROCEDURE — 85025 COMPLETE CBC W/AUTO DIFF WBC: CPT

## 2020-07-31 PROCEDURE — 6370000000 HC RX 637 (ALT 250 FOR IP): Performed by: SURGERY

## 2020-07-31 PROCEDURE — 1200000000 HC SEMI PRIVATE

## 2020-07-31 PROCEDURE — 99232 SBSQ HOSP IP/OBS MODERATE 35: CPT | Performed by: INTERNAL MEDICINE

## 2020-07-31 PROCEDURE — A9579 GAD-BASE MR CONTRAST NOS,1ML: HCPCS | Performed by: PODIATRIST

## 2020-07-31 PROCEDURE — 6370000000 HC RX 637 (ALT 250 FOR IP): Performed by: INTERNAL MEDICINE

## 2020-07-31 PROCEDURE — 80048 BASIC METABOLIC PNL TOTAL CA: CPT

## 2020-07-31 PROCEDURE — 86140 C-REACTIVE PROTEIN: CPT

## 2020-07-31 PROCEDURE — 36415 COLL VENOUS BLD VENIPUNCTURE: CPT

## 2020-07-31 PROCEDURE — 73720 MRI LWR EXTREMITY W/O&W/DYE: CPT

## 2020-07-31 PROCEDURE — 93923 UPR/LXTR ART STDY 3+ LVLS: CPT

## 2020-07-31 RX ADMIN — CLOPIDOGREL BISULFATE 75 MG: 75 TABLET ORAL at 08:12

## 2020-07-31 RX ADMIN — INSULIN GLARGINE 70 UNITS: 100 INJECTION, SOLUTION SUBCUTANEOUS at 20:01

## 2020-07-31 RX ADMIN — SODIUM CHLORIDE 3 G: 900 INJECTION INTRAVENOUS at 22:26

## 2020-07-31 RX ADMIN — SODIUM CHLORIDE 3 G: 900 INJECTION INTRAVENOUS at 11:03

## 2020-07-31 RX ADMIN — ATORVASTATIN CALCIUM 40 MG: 40 TABLET, FILM COATED ORAL at 20:01

## 2020-07-31 RX ADMIN — SODIUM CHLORIDE 3 G: 900 INJECTION INTRAVENOUS at 17:15

## 2020-07-31 RX ADMIN — INSULIN LISPRO 9 UNITS: 100 INJECTION, SOLUTION INTRAVENOUS; SUBCUTANEOUS at 11:03

## 2020-07-31 RX ADMIN — PANTOPRAZOLE SODIUM 40 MG: 40 TABLET, DELAYED RELEASE ORAL at 05:38

## 2020-07-31 RX ADMIN — APIXABAN 5 MG: 5 TABLET, FILM COATED ORAL at 20:01

## 2020-07-31 RX ADMIN — HYDROCODONE BITARTRATE AND ACETAMINOPHEN 2 TABLET: 5; 325 TABLET ORAL at 08:12

## 2020-07-31 RX ADMIN — SODIUM CHLORIDE: 4.5 INJECTION, SOLUTION INTRAVENOUS at 20:28

## 2020-07-31 RX ADMIN — GUAIFENESIN 600 MG: 600 TABLET, EXTENDED RELEASE ORAL at 20:01

## 2020-07-31 RX ADMIN — APIXABAN 5 MG: 5 TABLET, FILM COATED ORAL at 08:12

## 2020-07-31 RX ADMIN — VANCOMYCIN HYDROCHLORIDE 1250 MG: 1.25 INJECTION, POWDER, LYOPHILIZED, FOR SOLUTION INTRAVENOUS at 20:23

## 2020-07-31 RX ADMIN — MORPHINE SULFATE 4 MG: 4 INJECTION, SOLUTION INTRAMUSCULAR; INTRAVENOUS at 15:50

## 2020-07-31 RX ADMIN — INSULIN LISPRO 6 UNITS: 100 INJECTION, SOLUTION INTRAVENOUS; SUBCUTANEOUS at 16:18

## 2020-07-31 RX ADMIN — MORPHINE SULFATE 4 MG: 4 INJECTION, SOLUTION INTRAMUSCULAR; INTRAVENOUS at 09:51

## 2020-07-31 RX ADMIN — GADOTERIDOL 16 ML: 279.3 INJECTION, SOLUTION INTRAVENOUS at 09:27

## 2020-07-31 RX ADMIN — MORPHINE SULFATE 2 MG: 2 INJECTION, SOLUTION INTRAMUSCULAR; INTRAVENOUS at 05:38

## 2020-07-31 RX ADMIN — ASPIRIN 81 MG 81 MG: 81 TABLET ORAL at 08:12

## 2020-07-31 RX ADMIN — GUAIFENESIN 600 MG: 600 TABLET, EXTENDED RELEASE ORAL at 08:12

## 2020-07-31 RX ADMIN — HYDROCODONE BITARTRATE AND ACETAMINOPHEN 2 TABLET: 5; 325 TABLET ORAL at 12:55

## 2020-07-31 RX ADMIN — MORPHINE SULFATE 4 MG: 4 INJECTION, SOLUTION INTRAMUSCULAR; INTRAVENOUS at 22:27

## 2020-07-31 RX ADMIN — INSULIN LISPRO 6 UNITS: 100 INJECTION, SOLUTION INTRAVENOUS; SUBCUTANEOUS at 20:02

## 2020-07-31 RX ADMIN — VANCOMYCIN HYDROCHLORIDE 1250 MG: 1.25 INJECTION, POWDER, LYOPHILIZED, FOR SOLUTION INTRAVENOUS at 08:11

## 2020-07-31 RX ADMIN — HYDROCODONE BITARTRATE AND ACETAMINOPHEN 2 TABLET: 5; 325 TABLET ORAL at 20:32

## 2020-07-31 RX ADMIN — SODIUM CHLORIDE 3 G: 900 INJECTION INTRAVENOUS at 05:23

## 2020-07-31 RX ADMIN — SODIUM CHLORIDE: 4.5 INJECTION, SOLUTION INTRAVENOUS at 04:27

## 2020-07-31 RX ADMIN — METFORMIN HYDROCHLORIDE 500 MG: 500 TABLET ORAL at 17:15

## 2020-07-31 RX ADMIN — MORPHINE SULFATE 4 MG: 4 INJECTION, SOLUTION INTRAMUSCULAR; INTRAVENOUS at 00:48

## 2020-07-31 ASSESSMENT — PAIN DESCRIPTION - PAIN TYPE
TYPE: ACUTE PAIN

## 2020-07-31 ASSESSMENT — PAIN SCALES - GENERAL
PAINLEVEL_OUTOF10: 8
PAINLEVEL_OUTOF10: 7
PAINLEVEL_OUTOF10: 8
PAINLEVEL_OUTOF10: 7
PAINLEVEL_OUTOF10: 6
PAINLEVEL_OUTOF10: 7
PAINLEVEL_OUTOF10: 8
PAINLEVEL_OUTOF10: 8
PAINLEVEL_OUTOF10: 6
PAINLEVEL_OUTOF10: 7

## 2020-07-31 ASSESSMENT — ENCOUNTER SYMPTOMS
COUGH: 1
SHORTNESS OF BREATH: 0
NAUSEA: 0
RESPIRATORY NEGATIVE: 1
VOMITING: 0
ABDOMINAL PAIN: 0
GASTROINTESTINAL NEGATIVE: 1
ALLERGIC/IMMUNOLOGIC NEGATIVE: 1

## 2020-07-31 ASSESSMENT — PAIN DESCRIPTION - FREQUENCY
FREQUENCY: CONTINUOUS

## 2020-07-31 ASSESSMENT — PAIN DESCRIPTION - ONSET
ONSET: ON-GOING

## 2020-07-31 ASSESSMENT — PAIN DESCRIPTION - PROGRESSION
CLINICAL_PROGRESSION: NOT CHANGED

## 2020-07-31 ASSESSMENT — PAIN DESCRIPTION - DESCRIPTORS
DESCRIPTORS: ACHING;DISCOMFORT
DESCRIPTORS: STABBING

## 2020-07-31 ASSESSMENT — PAIN DESCRIPTION - LOCATION
LOCATION: FOOT

## 2020-07-31 ASSESSMENT — PAIN DESCRIPTION - ORIENTATION
ORIENTATION: LEFT

## 2020-07-31 NOTE — PLAN OF CARE
Problem: Falls - Risk of:  Goal: Will remain free from falls  Description: Will remain free from falls  Outcome: Ongoing  Note: Siderails up x 2  Hourly rounding  Call light in reach  Instructed to call for assist before attempting out of bed. Remains free from falls and accidental injury at this time   Floor free from obstacles  Bed is locked and in lowest position  Adequate lighting provided  Bed alarm on, Red Falling star and Stay with Me signs posted         Problem: Pain:  Goal: Pain level will decrease  Description: Pain level will decrease  Outcome: Ongoing  Note: Pain level assessment complete.    Patient educated on pain scale and control interventions  PRN pain medication given per patient request  Patient instructed to call out with new onset of pain or unrelieved pain       Problem: Skin Integrity:  Goal: Will show no infection signs and symptoms  Description: Will show no infection signs and symptoms  Outcome: Ongoing  Note: Skin assessment completed and documented  Real scale updated  Relieve pressure to bony prominences  Avoid shearing  Assess s/sx of infection   Air mattress in place  Up with assistance  Turns self  RBKA  Left great toe wound  See orders  Atb as ordered  Dressing changes as ordered

## 2020-07-31 NOTE — PROGRESS NOTES
Pulse 96   Temp 97.7 °F (36.5 °C)   Resp 14   Ht 6' 1\" (1.854 m)   Wt 166 lb 3.2 oz (75.4 kg)   SpO2 96%   BMI 21.93 kg/m²   General appearance: alert, cooperative and no distress  Mental Status: oriented to person, place and time with normal affect  Neck: good carotid pulses, no JVD  Lungs: clear to auscultation bilaterally, normal effort  Heart: regular rate and rhythm, no murmur,  Abdomen: soft, non-tender, non-distended, bowel sounds present all four quadrants, no masses, hepatomegaly, splenomegaly or aortic enlargement  Extremities: Left hallux gangrene, palpable left posterior tibial pulse, right below-knee amputation  Skin: no gross lesions, rashes, or induration    Assessment:   3 62-year-old diabetic male doing well status post left lower extremity atherectomy and DCB angioplasty    Patient Active Problem List:     Type 2 diabetes mellitus with diabetic polyneuropathy, with long-term current use of insulin (HCC)     Neuropathic pain, leg     Diabetic polyneuropathy ([facility])     Allergic rhinitis     Tobacco dependence     COPD (chronic obstructive pulmonary disease) ([facility])     Edentulous     Dysphagia     Lung nodules     Esophageal cancer (HCC)     Fracture of humerus, left, closed     Closed fracture of humerus     DM type 2 with diabetic peripheral neuropathy (HCC)     Chronic obstructive pulmonary disease (HCC)     Dyslipidemia     Gastroesophageal reflux disease     Chronic pain syndrome     Marijuana use     History of colon polyps     Cellulitis of right heel     PVD (peripheral vascular disease) ([facility])     Functional diarrhea     Sepsis due to methicillin resistant Staphylococcus aureus (MRSA) (HCC)     History of esophageal cancer     Osteomyelitis, chronic, ankle or foot ([facility])     Peripheral artery disease ([address]     Other pulmonary embolism without acute cor pulmonale (HCC)     Carotid stenosis, asymptomatic, bilateral     Lower limb amputation status     Fx humeral neck, right, closed, initial encounter     Transient hypotension     Constipation due to opioid therapy     Acute kidney injury ([address]     Diabetic ulcer of left midfoot associated with type 2 diabetes mellitus, with fat layer exposed ([address]     Complication of below knee amputation stump (HCC)     COPD exacerbation (HCC)     Hyponatremia     Pain, phantom limb ([address]     Below-knee amputation of right lower extremity (HCC)     Hyperglycemia     Moderate protein malnutrition ([address]     Essential hypertension     Uncontrolled type 2 diabetes mellitus with hyperglycemia ([address]     History of pulmonary embolism - 2017     Atelectasis     Uncontrolled type 2 diabetes mellitus with foot ulcer (HCC)     Azotemia     Anemia, normocytic normochromic      Plan:   1. Lower extremity PVR study status post atherectomy  2. Continue antibiotics  3. Had a long discussion with him regarding the option of undergoing left hallux amputation versus TMA. Given that only 3 toes would remain, TMA is likely his best option. Advised him to discuss left hallux amputation versus TMA with podiatry.     Electronically signed by Guevara Segura MD on 7/31/2020 at 3:36 PM

## 2020-07-31 NOTE — PROGRESS NOTES
Nutrition Assessment     Type and Reason for Visit: Initial, Positive Nutrition Screen    Nutrition Recommendations/Plan:   1. Suggest allowing double protein portions  2. Monitor PO intake, weight, wound healing     Nutrition Assessment:  Patient admitted with uncontrolled type 2 DM w/foot ulcer. Patient reports having a good appetite (eating 100% of meals) and states he's \"not getting enough to eat. \" Patient declined nutrition supplement but would like to have double meat portions. RD will add double portions to meal ticket and monitor nutrition status. Malnutrition Assessment:  Malnutrition Status: No malnutrition    Estimated Daily Nutrient Needs:  Energy (kcal): 1997-7700; Weight Used for Energy Requirements:  Admission     Protein (g): 84-98 g (1.2-1.4 g/kg); Weight Used for Protein Requirements:  Admission        Fluid (ml/day): 1 mL/kcal; Weight Used for Fluid Requirements:  Admission      Nutrition Related Findings: GI: WDL; Edema: JESSE (covered by dressing); Skin: (L) foot wound, (R) BKA      Current Nutrition Therapies:    DIET GENERAL;     Anthropometric Measures:  · Height: 6' 1\" (185.4 cm)  · Current Body Wt: 166 lb 3.6 oz (75.4 kg)   · BMI: 21.9    Nutrition Diagnosis:   · Increased nutrient needs related to other (comment)(wound healing) as evidenced by wounds      Nutrition Interventions:   Food and/or Nutrient Delivery:  Modify Current Diet(double meat/protein)  Nutrition Education/Counseling:  Education not indicated   Coordination of Nutrition Care:  Continued Inpatient Monitoring    Goals:  PO intake to meet >75% protein needs       Nutrition Monitoring and Evaluation:   Behavioral-Environmental Outcomes:  (none)   Food/Nutrient Intake Outcomes:  Food and Nutrient Intake  Physical Signs/Symptoms Outcomes:  Skin, Weight     Discharge Planning:    No discharge needs at this time     Electronically signed by Bruno Laureano RD, LD on 7/31/20 at 2:22 PM EDT    Contact: [phone]

## 2020-07-31 NOTE — PROGRESS NOTES
[address]    Progress Note    7/31/2020    4:58 PM    Name:   Hesham Bowman  MRN:     [id number]     Acct:      [de-identified]   Room:   2010/2010-02  IP Day:  4  Admit Date:  7/27/2020  3:31 PM    PCP:   Cheryle Zhou DO  Code Status:  Full Code    Subjective:     C/C:   Chief Complaint   Patient presents with    Leg Pain    Foot Pain     left     Interval History Status:   His foot pain is controlled  He is still declining amputation  Dyspepsia resolved  Constipated  Cough improved    Data Base Updates:  Blood sugars have been labile  Glucose 244High   Glucose 64Low mg/dL     BUN 16 mg/dL CREATININE 0.76     WBC 12. 9High k/uL RBC 3.61Low m/uL Hemoglobin 10.7Low     MRI revealing osteomyelitis of the fourth phalange(see below)    CRP 64.5High       Brief History:      The patient reports to the hospital with complaint of left foot pain. The patient states that he sustained a wound to his right great toe several weeks ago. He visited the emergency room 7/12/2020 and was prescribed doxycycline and given a referral to podiatry. The patient states he did not follow up with podiatry. The patient states that his wound has worsened over the past several days and he has noticed bloody drainage from his left great toe. He endorses neuropathy to his left lower extremity and has a right-sided BKA. He states he was told previously that he needed \"stents in my leg\". He denies fever, chills nausea or vomiting. No additional symptomology or definitive modifying factors. The patient has past medical history that includes diabetes, hypertension, dyslipidemia and COPD. He is a current every day cigarette smoker.  He is prescribed eliquis.      Of note, the patient has several ER visits over the past month with complaint of left leg pain and phantom right leg pain.      Initial chemistries include: Blood glucose 331, CRP 62.4, sed rate 91, WBC 12.5, HBG 11.3, HCT 37.6.     X-ray left foot indicates soft tissue swelling of the first ray with some associated punctate radiopaque foreign bodies. Reabsorption and/or amputation of the fifth ray, some associated soft tissue changes. \"     Subsequent database included:  Sodium 134Low     BUN 26High     The patient has been admitted  Vascular surgery consulted  Podiatry consulted  Blood sugars will be monitored and controlled  Electrolyte abnormalities will be addressed  Renal function will be observed  Antibiotics per Infectious Disease service     On 7/29 the patient underwent:  PROCEDURES PERFORMED:  1. Aortogram with selective left lower extremity runoff (third-order  selective). 2.  Left superficial femoral, popliteal, tibioperoneal trunk and  posterior tibial artery atherectomy with Jetstream 2.4 and 1.85 mm  catheters. 3.  Left popliteal, tibioperoneal trunk, and posterior tibial artery  angioplasty with [place] 3 mm x 220 mm balloon. 4.  Left superficial femoral, popliteal, and tibioperoneal trunk  drug-coated balloon angioplasty with 4 mm x 250 mm Admiral balloon. 5.  Left superficial femoral and popliteal artery DCB angioplasty with  Admiral 5 mm x 250 mm balloons x2.  6.  Left common and external iliac artery angioplasty with [place] 7 mm  x 40 mm balloon. 7.  Ultrasound-guided access, right common femoral artery. 8.  Placement of right femoral Mynx closure device. 9.  Conscious sedation. Podiatry and vascular has recommended amputation  Toe amputation versus TMA  The patient is still declining surgery  He believes the wound will heal with time    Medications: Allergies:     Allergies   Allergen Reactions    Gabapentin Other (See Comments)     dizziness       Current Meds:   Scheduled Meds:    guaiFENesin  600 mg Oral BID    insulin glargine  70 Units Subcutaneous Nightly    ampicillin-sulbactam  3 g Intravenous Q6H    clopidogrel  75 mg Oral Daily    pantoprazole  40 mg Oral QAM AC    sodium Mother     Alcohol Abuse Father     Cancer Sister     Alcohol Abuse Maternal Aunt     Alcohol Abuse Maternal Uncle     Alcohol Abuse Paternal Aunt        Vitals:  /62   Pulse 96   Temp 97.7 °F (36.5 °C)   Resp 14   Ht 6' 1\" (1.854 m)   Wt 166 lb 3.2 oz (75.4 kg)   SpO2 96%   BMI 21.93 kg/m²   Temp (24hrs), Av.6 °F (36.4 °C), Min:97.3 °F (36.3 °C), Max:97.9 °F (36.6 °C)    Recent Labs     20  0711 20  1042 20  1602 20  1644   POCGLU 138* 289* 244* 226*       I/O (24Hr): Intake/Output Summary (Last 24 hours) at 2020 1658  Last data filed at 2020 1344  Gross per 24 hour   Intake --   Output 2770 ml   Net -2770 ml         Review of Systems:     Review of Systems   Constitutional: Positive for activity change (Diminished). Negative for chills, diaphoresis and fatigue. Respiratory: Positive for cough (Nonproductive). Negative for shortness of breath. Cardiovascular: Negative for chest pain and palpitations. Gastrointestinal: Negative for abdominal pain, nausea and vomiting. He reports occasional reflux symptoms  The patient reports that oral pain meds make him sick and vomit  He is requesting IV pain meds   Genitourinary: Negative for flank pain and hematuria. Musculoskeletal: Positive for arthralgias and myalgias. Patient continues to have left toe/foot pain   Skin: Positive for wound (See chief complaint). Neurological: Positive for numbness (Known diabetic peripheral neuropathy). Chronic phantom pain from his right BKA       Physical Examination:        Physical Exam  Vitals signs reviewed. Constitutional:       General: He is not in acute distress. Appearance: He is not diaphoretic. HENT:      Head: Normocephalic. Nose: Nose normal.   Eyes:      General: No scleral icterus. Conjunctiva/sclera: Conjunctivae normal.   Neck:      Musculoskeletal: Neck supple. Trachea: No tracheal deviation.    Cardiovascular: Rate and Rhythm: Normal rate and regular rhythm. Pulmonary:      Effort: Pulmonary effort is normal. No respiratory distress. Breath sounds: Normal breath sounds. No wheezing or rales. Chest:      Chest wall: No tenderness. Abdominal:      General: Bowel sounds are normal. There is no distension. Palpations: Abdomen is soft. Tenderness: There is no abdominal tenderness. Musculoskeletal:         General: Deformity (Right BKA) present. No tenderness. Comments: Decreased muscle mass   Skin:     General: Skin is warm and dry. Comments: His foot is dressed dry and clean       Prior photo:          Labs:  Hematology:  Recent Labs     07/29/20  0552 07/29/20  0923 07/30/20  0526 07/31/20  0521   WBC 10.9  --  10.8 12.9*   RBC 3.73*  --  3.40* 3.61*   HGB 11.3*  --  10.1* 10.7*   HCT 36.2*  --  33.4* 35.5*   MCV 97.1  --  98.2 98.3   MCH 30.3  --  29.7 29.6   MCHC 31.2  --  30.2 30.1   RDW 12.4  --  12.3 12.3     --  373 456*   MPV 8.9  --  9.3 8.9   CRP 53.5*  --   --  64.5*   INR  --  1.0  --   --      Chemistry:  Recent Labs     07/29/20  0552 07/30/20  0526 07/31/20  0521    142 142   K 4.5 3.9 4.3    105 105   CO2 30 27 29   GLUCOSE 75 73 64*   BUN 12 16 16   CREATININE 0.74 0.74 0.76   ANIONGAP 11 10 8*   LABGLOM >60 >60 >60   GFRAA >60 >60 >60   CALCIUM 8.7 8.5* 8.8     Recent Labs     07/30/20  2043 07/31/20  0636 07/31/20  0711 07/31/20  1042 07/31/20  1602 07/31/20  1644   POCGLU 222* 57* 138* 289* 244* 226*         Radiology:    Xr Radius Ulna Left (2 Views)    Result Date: 7/24/2020  No acute abnormality. Xr Foot Left (min 3 Views)    Result Date: 7/27/2020  Soft tissue swelling of the 1st ray with some associated punctate radiopaque foreign bodies. Resorption and/or amputation of the 5th ray some associated soft tissue changes. Ct Head Wo Contrast    Result Date: 7/24/2020  No acute intracranial abnormality.      Ct Cervical Spine Wo Contrast    Result Date: 7/24/2020  Multilevel degenerative changes with no acute abnormality of the cervical spine. Dilated cervical and upper thoracic esophagus. There is a history of esophagectomy and findings likely represent partial visualization of a gastric pull-through procedure.        Assessment:        Primary Problem  Uncontrolled type 2 diabetes mellitus with foot ulcer [facility])    Active Hospital Problems    Diagnosis Date Noted    Osteomyelitis of fourth phalange of left foot ([facility] [M86.9] 07/31/2020    Anemia, normocytic normochromic [D64.9] 07/30/2020    Azotemia [R79.89]     Uncontrolled type 2 diabetes mellitus with foot ulcer ([facility] [address] [J00.096, L97.509, E11.65] 07/27/2020    Essential hypertension [I10]     [facility] [I73.9]     Dyslipidemia [E78.5]     COPD [facility]chronic obstructive [facility] disease) [facility] [J44.9]     Tobacco dependence [F17.200]        Plan:        Podiatry evaluation and treatment continues  Toe amputation or TMA suggested: Patient declining  Vascular surgery evaluation in progress  Antibiotics per Infectious Disease service   Blood sugars will be monitored and controlled  Metformin will be held for 48 hours after contrast  Electrolyte abnormalities will be addressed  Renal function will be observed  Correct electrolyte abnormalities   Pain management  Respiratory Therapy and Bronchodilators prn  Tessalon ordered for cough  Mucinex ordered for congestion  Smoking cessation / education   Reflux precautions   Maalox  Risk factor management   Blood products prn - will check H&H  Anemia w/u on outpatient basis is suggested    Home antibiotic evaluation hi Dr. Morgan Katerine calling back on Mr. Rhona Russ in 10 it looks like is a Jewell Swann still taking care of him  Discharge planning  If the patient continues to decline toe amputation he will be discharged with surgery to be arranged at future date  Will discharge when arrangements complete and ok with other services.   Follow-up with PCP in one week, Dutch Lizarraga DO  Notify PCP of discharge     IP CONSULT TO PODIATRY  IP CONSULT TO INTERNAL MEDICINE  PHARMACY TO DOSE VANCOMYCIN  IP CONSULT TO SOCIAL WORK  IP CONSULT TO INFECTIOUS DISEASES  [facility] [place] [address], DO  7/31/2020  4:58 PM

## 2020-07-31 NOTE — PROGRESS NOTES
Progress Note  Podiatric Medicine and Surgery     Subjective     CC: Left toe wound    Patient seen and examined at bedside,  Afebrile  Pain controlled   denies any N/V/F/C/CP/SOB      HPI :  Amina Morales is a 60 y.o. male seen at [facility] emergency department. Patient states that he was walking around in his shoe, when he noticed that it was bleeding through. Patient reports that he usually only wear socks, because he knows that this can be a problem. Patient has a history of a right BKA, left fifth ray resection. Patient also has a history of uncontrolled diabetes. Patient reported that he has had his blood sugars ranging between 200 and 600. Patient also has a well-known history of poor compliance, and leaving the emergency department AMA. Patient does not have a podiatrist he follows with. Patient denies any other pedal complaints       ROS: Denies N/V/F/C/SOB/CP. Otherwise negative except at stated in the HPI.      Medications:  Scheduled Meds:   guaiFENesin  600 mg Oral BID    insulin glargine  70 Units Subcutaneous Nightly    ampicillin-sulbactam  3 g Intravenous Q6H    clopidogrel  75 mg Oral Daily    pantoprazole  40 mg Oral QAM AC    sodium chloride flush  10 mL Intravenous 2 times per day    atorvastatin  40 mg Oral Nightly    [Held by provider] metFORMIN  500 mg Oral BID WC    aspirin  81 mg Oral Daily    apixaban  5 mg Oral BID    insulin lispro  0-18 Units Subcutaneous TID WC    insulin lispro  0-9 Units Subcutaneous Nightly    vancomycin  1,250 mg Intravenous Q12H    vancomycin (VANCOCIN) intermittent dosing (placeholder)   Other RX Placeholder    fluticasone  2 spray Each Nostril Daily       Continuous Infusions:   sodium chloride 75 mL/hr at 07/31/20 0427    sodium chloride 50 mL/hr at 07/28/20 1953    dextrose         PRN Meds:aluminum & magnesium hydroxide-simethicone, benzonatate, bisacodyl, acetaminophen, HYDROcodone 5 mg - acetaminophen **OR** HYDROcodone 5 mg - acetaminophen, morphine **OR** morphine, ondansetron, ipratropium-albuterol, hydrALAZINE, sodium chloride flush, potassium chloride **OR** potassium alternative oral replacement **OR** potassium chloride, magnesium sulfate, polyethylene glycol, promethazine **OR** [DISCONTINUED] ondansetron, nicotine, glucose, dextrose, glucagon (rDNA), dextrose, diphenhydrAMINE, calcium carbonate    Objective     Vitals:  Patient Vitals for the past 8 hrs:   BP Temp Temp src Pulse Resp SpO2 Weight   20 0449 -- -- -- -- -- -- 166 lb 3.2 oz (75.4 kg)   20 0418 131/62 97.9 °F (36.6 °C) Oral 80 16 (!) 88 % --   20 0034 (!) 145/69 97.3 °F (36.3 °C) Oral 91 18 94 % --     Average, Min, and Max for last 24 hours Vitals:  TEMPERATURE:  Temp  Av.8 °F (36.6 °C)  Min: 97.3 °F (36.3 °C)  Max: 98.1 °F (36.7 °C)    RESPIRATIONS RANGE: Resp  Av.5  Min: 16  Max: 18    PULSE RANGE: Pulse  Av  Min: 80  Max: 102    BLOOD PRESSURE RANGE:  Systolic (28ZYH), OBM:000 , Min:131 , DXM:084   ; Diastolic (59LUY), ZRS:02, Min:62, Max:82      PULSE OXIMETRY RANGE: SpO2  Av.8 %  Min: 88 %  Max: 98 %    I/O last 3 completed shifts:  In: -   Out: 1525 [Urine:1525]    CBC:  Recent Labs     20   WBC 10.9 10.8 12.9*   HGB 11.3* 10.1* 10.7*   HCT 36.2* 33.4* 35.5*    373 456*   CRP 53.5*  --   --         BMP:  Recent Labs     20    142 142   K 4.5 3.9 4.3    105 105   CO2 30 27 29   BUN 12 16 16   CREATININE 0.74 0.74 0.76   GLUCOSE 75 73 64*   CALCIUM 8.7 8.5* 8.8        Coags:  Recent Labs     20  0923   INR 1.0       Lab Results   Component Value Date    SEDRATE 91 (H) 2020     Recent Labs     20  0552   CRP 53.5*       Lower Extremity Physical Exam:  Vascular:  DP nonpalpable, PT palpable LLE. Hair growth is absent to the level of the digits. No edema.     Left leg is warm to cold of the left lower extremity. Left hallux is cool to the touch.     Neuro: Saph/sural/SP/DP/plantar sensation  absent to light touch.       Musculoskeletal: Muscle strength testing was deferred due to patient's current state. Decreased range of motion noted to the ankle Gross deformity is left partial fifth ray resection and Right below the knee amputation      Dermatologic: Left hallux is necrotic circumferentially with dorsal central area of viable skin. Increased maceration on lateral aspect of left hallux. Erythema noted proximal to the hallux to the dorsum of the foot with associated increase in warmth. Some malodor still present     Full thickness ulceration #1 noted to the plantar medial aspect of the right 1st MT, measures 0.1 x 0.1 x 0.2. Periwound is hyperkeratotic, no erythema or drainage noted. No increase in warmth, fluctuance, sinus tracking, or induration appreciated     Wound on the plantar lateral aspect of the fourth distal digit appears with eschar, macerated wound border, necrotic wound base. No periwound erythema, no drainage, no fluctuance appreciated. Does not probe to bone no sinus tracking no undermining. Clinical Images:  None          Imaging:   IR ANGIOGRAM EXTREMITY LEFT   Final Result      XR FOOT LEFT (MIN 3 VIEWS)   Final Result   Soft tissue swelling of the 1st ray with some associated punctate radiopaque   foreign bodies. Resorption and/or amputation of the 5th ray some associated soft tissue   changes. MRI FOOT LEFT W WO CONTRAST    (Results Pending)   VL Arterial PVR Lower    (Results Pending)       Cultures: none    Assessment   Ross Nolan is a 60 y.o. male with   1. Osteomyelitis proximal phalanx of hallux, distal and middle phalanx of fourth toe left foot  2. Dry gangrene, left hallux  3. Cellulitis, left foot  4. S/p partial 5th ray amputation, left foot  5. S/p Right BKA  6. PAD  7.  Uncontrolled type 2 diabetes    Principal Problem:    Uncontrolled type 2 diabetes mellitus with foot ulcer ([facility] [address]  Active Problems:    Tobacco dependence    COPD (chronic obstructive pulmonary disease) (HCC)    Dyslipidemia    Peripheral artery disease (HCC)    Essential hypertension    Azotemia    Anemia, normocytic normochromic  Resolved Problems:    * No resolved hospital problems. *       Plan     · Patient examined and evaluated at bedside with Dr. Alex Mae  · Treatment options discussed in detail with the patient  · Medical management per primary  · Abx: Vancomycin/Unasyn, ID following  · vasc - post angio 2 vessel run off   · Reviewed MRI-suspicion for left foot osteomyelitis of proximal phalanx hallux, distal and middle phalanx left fourth toe  · Discussed with patient of treatment options. Explained to patient that due to the extent of necrosis that a proximal amputation is recommended but he does have a chance of healing since blood flow was reestablished. Patient is currently refusing amputation or any other surgical intervention. Patient is currently believing that given successful angiogram per vascular he is no longer infected. Informed patient to provide best functional outcomes TMA is indicated. Patient would prefer to speak with Dr. Jamar Lucero prior to making any decisions  · Dressings applied to left leg include: Betadine wet-to-dry, light Kerlix.   · WBAT to the left foot in a surgical shoe  · Discussed with Joyce Mcmanus 26   Podiatric Medicine & Surgery   7/31/2020 at 6:09 AM

## 2020-07-31 NOTE — PLAN OF CARE
Nutrition Problem #1: Increased nutrient needs  Intervention: Food and/or Nutrient Delivery: Modify Current Diet(double meat/protein)  Nutritional Goals: PO intake to meet >75% protein needs

## 2020-07-31 NOTE — PLAN OF CARE
Problem: Falls - Risk of:  Goal: Will remain free from falls  Description: Will remain free from falls  Outcome: Ongoing  Goal: Absence of physical injury  Description: Absence of physical injury  Outcome: Ongoing     Problem: Pain:  Goal: Pain level will decrease  Description: Pain level will decrease  7/31/2020 0310 by Ashley Murray RN  Outcome: Ongoing  7/30/2020 1421 by Bao Lopez RN  Outcome: Ongoing  7/30/2020 1334 by Bao Lopez RN  Outcome: Ongoing  Goal: Control of acute pain  Description: Control of acute pain  7/31/2020 0310 by Ashley Murray RN  Outcome: Ongoing  7/30/2020 1421 by Bao Lopez RN  Outcome: Ongoing  7/30/2020 1334 by Bao Lopez RN  Outcome: Ongoing  Goal: Control of chronic pain  Description: Control of chronic pain  7/31/2020 0310 by Ashley Murray RN  Outcome: Ongoing  7/30/2020 1421 by Bao Lopez RN  Outcome: Ongoing  7/30/2020 1334 by Bao Lopez RN  Outcome: Ongoing     Problem: Serum Glucose Level - Abnormal:  Goal: Ability to maintain appropriate glucose levels will improve  Description: Ability to maintain appropriate glucose levels will improve  Outcome: Ongoing     Problem: Skin Integrity:  Goal: Will show no infection signs and symptoms  Description: Will show no infection signs and symptoms  Outcome: Ongoing  Goal: Absence of new skin breakdown  Description: Absence of new skin breakdown  Outcome: Ongoing

## 2020-08-01 ENCOUNTER — APPOINTMENT (OUTPATIENT)
Dept: GENERAL RADIOLOGY | Age: 65
DRG: 271 | End: 2020-08-01
Payer: MEDICARE

## 2020-08-01 LAB
ABSOLUTE EOS #: 0.46 K/UL (ref 0–0.44)
ABSOLUTE IMMATURE GRANULOCYTE: 0.08 K/UL (ref 0–0.3)
ABSOLUTE LYMPH #: 2.03 K/UL (ref 1.1–3.7)
ABSOLUTE MONO #: 1.09 K/UL (ref 0.1–1.2)
ANION GAP SERPL CALCULATED.3IONS-SCNC: 11 MMOL/L (ref 9–17)
BASOPHILS # BLD: 1 % (ref 0–2)
BASOPHILS ABSOLUTE: 0.07 K/UL (ref 0–0.2)
BUN BLDV-MCNC: 14 MG/DL (ref 8–23)
BUN/CREAT BLD: 20 (ref 9–20)
CALCIUM SERPL-MCNC: 8.6 MG/DL (ref 8.6–10.4)
CHLORIDE BLD-SCNC: 104 MMOL/L (ref 98–107)
CO2: 30 MMOL/L (ref 20–31)
CREAT SERPL-MCNC: 0.69 MG/DL (ref 0.7–1.2)
DIFFERENTIAL TYPE: ABNORMAL
EOSINOPHILS RELATIVE PERCENT: 4 % (ref 1–4)
GFR AFRICAN AMERICAN: >60 ML/MIN
GFR NON-AFRICAN AMERICAN: >60 ML/MIN
GFR SERPL CREATININE-BSD FRML MDRD: ABNORMAL ML/MIN/{1.73_M2}
GFR SERPL CREATININE-BSD FRML MDRD: ABNORMAL ML/MIN/{1.73_M2}
GLUCOSE BLD-MCNC: 109 MG/DL (ref 70–99)
GLUCOSE BLD-MCNC: 133 MG/DL (ref 75–110)
GLUCOSE BLD-MCNC: 177 MG/DL (ref 75–110)
GLUCOSE BLD-MCNC: 193 MG/DL (ref 75–110)
GLUCOSE BLD-MCNC: 230 MG/DL (ref 75–110)
GLUCOSE BLD-MCNC: 80 MG/DL (ref 75–110)
GLUCOSE BLD-MCNC: 84 MG/DL (ref 75–110)
HCT VFR BLD CALC: 33.5 % (ref 40.7–50.3)
HEMOGLOBIN: 10.2 G/DL (ref 13–17)
IMMATURE GRANULOCYTES: 1 %
LYMPHOCYTES # BLD: 18 % (ref 24–43)
MCH RBC QN AUTO: 29.6 PG (ref 25.2–33.5)
MCHC RBC AUTO-ENTMCNC: 30.4 G/DL (ref 28.4–34.8)
MCV RBC AUTO: 97.1 FL (ref 82.6–102.9)
MONOCYTES # BLD: 10 % (ref 3–12)
NRBC AUTOMATED: 0 PER 100 WBC
PDW BLD-RTO: 12.2 % (ref 11.8–14.4)
PLATELET # BLD: 392 K/UL (ref 138–453)
PLATELET ESTIMATE: ABNORMAL
PMV BLD AUTO: 8.6 FL (ref 8.1–13.5)
POTASSIUM SERPL-SCNC: 4 MMOL/L (ref 3.7–5.3)
RBC # BLD: 3.45 M/UL (ref 4.21–5.77)
RBC # BLD: ABNORMAL 10*6/UL
SEG NEUTROPHILS: 66 % (ref 36–65)
SEGMENTED NEUTROPHILS ABSOLUTE COUNT: 7.64 K/UL (ref 1.5–8.1)
SODIUM BLD-SCNC: 145 MMOL/L (ref 135–144)
WBC # BLD: 11.4 K/UL (ref 3.5–11.3)
WBC # BLD: ABNORMAL 10*3/UL

## 2020-08-01 PROCEDURE — 6360000002 HC RX W HCPCS: Performed by: SURGERY

## 2020-08-01 PROCEDURE — 85025 COMPLETE CBC W/AUTO DIFF WBC: CPT

## 2020-08-01 PROCEDURE — 6370000000 HC RX 637 (ALT 250 FOR IP): Performed by: SURGERY

## 2020-08-01 PROCEDURE — 6370000000 HC RX 637 (ALT 250 FOR IP): Performed by: INTERNAL MEDICINE

## 2020-08-01 PROCEDURE — 82947 ASSAY GLUCOSE BLOOD QUANT: CPT

## 2020-08-01 PROCEDURE — 80048 BASIC METABOLIC PNL TOTAL CA: CPT

## 2020-08-01 PROCEDURE — 1200000000 HC SEMI PRIVATE

## 2020-08-01 PROCEDURE — 6370000000 HC RX 637 (ALT 250 FOR IP): Performed by: NURSE PRACTITIONER

## 2020-08-01 PROCEDURE — 2580000003 HC RX 258: Performed by: SURGERY

## 2020-08-01 PROCEDURE — 36415 COLL VENOUS BLD VENIPUNCTURE: CPT

## 2020-08-01 PROCEDURE — 99232 SBSQ HOSP IP/OBS MODERATE 35: CPT | Performed by: INTERNAL MEDICINE

## 2020-08-01 PROCEDURE — 74018 RADEX ABDOMEN 1 VIEW: CPT

## 2020-08-01 RX ORDER — INSULIN GLARGINE 100 [IU]/ML
35 INJECTION, SOLUTION SUBCUTANEOUS NIGHTLY
Status: DISCONTINUED | OUTPATIENT
Start: 2020-08-01 | End: 2020-08-03 | Stop reason: HOSPADM

## 2020-08-01 RX ADMIN — MORPHINE SULFATE 4 MG: 4 INJECTION, SOLUTION INTRAMUSCULAR; INTRAVENOUS at 22:32

## 2020-08-01 RX ADMIN — HYDROCODONE BITARTRATE AND ACETAMINOPHEN 2 TABLET: 5; 325 TABLET ORAL at 04:37

## 2020-08-01 RX ADMIN — SODIUM CHLORIDE 3 G: 900 INJECTION INTRAVENOUS at 03:56

## 2020-08-01 RX ADMIN — MAGNESIUM HYDROXIDE 30 ML: 400 SUSPENSION ORAL at 16:43

## 2020-08-01 RX ADMIN — GUAIFENESIN 600 MG: 600 TABLET, EXTENDED RELEASE ORAL at 09:53

## 2020-08-01 RX ADMIN — MORPHINE SULFATE 4 MG: 4 INJECTION, SOLUTION INTRAMUSCULAR; INTRAVENOUS at 02:06

## 2020-08-01 RX ADMIN — SODIUM CHLORIDE 3 G: 900 INJECTION INTRAVENOUS at 22:32

## 2020-08-01 RX ADMIN — INSULIN LISPRO 2 UNITS: 100 INJECTION, SOLUTION INTRAVENOUS; SUBCUTANEOUS at 22:27

## 2020-08-01 RX ADMIN — SODIUM CHLORIDE 3 G: 900 INJECTION INTRAVENOUS at 10:02

## 2020-08-01 RX ADMIN — MORPHINE SULFATE 2 MG: 2 INJECTION, SOLUTION INTRAMUSCULAR; INTRAVENOUS at 16:45

## 2020-08-01 RX ADMIN — VANCOMYCIN HYDROCHLORIDE 1250 MG: 1.25 INJECTION, POWDER, LYOPHILIZED, FOR SOLUTION INTRAVENOUS at 10:51

## 2020-08-01 RX ADMIN — HYDROCODONE BITARTRATE AND ACETAMINOPHEN 2 TABLET: 5; 325 TABLET ORAL at 00:37

## 2020-08-01 RX ADMIN — PANTOPRAZOLE SODIUM 40 MG: 40 TABLET, DELAYED RELEASE ORAL at 05:52

## 2020-08-01 RX ADMIN — HYDROCODONE BITARTRATE AND ACETAMINOPHEN 2 TABLET: 5; 325 TABLET ORAL at 20:23

## 2020-08-01 RX ADMIN — ONDANSETRON 4 MG: 2 INJECTION INTRAMUSCULAR; INTRAVENOUS at 23:53

## 2020-08-01 RX ADMIN — INSULIN LISPRO 6 UNITS: 100 INJECTION, SOLUTION INTRAVENOUS; SUBCUTANEOUS at 16:45

## 2020-08-01 RX ADMIN — HYDROCODONE BITARTRATE AND ACETAMINOPHEN 2 TABLET: 5; 325 TABLET ORAL at 15:17

## 2020-08-01 RX ADMIN — CLOPIDOGREL BISULFATE 75 MG: 75 TABLET ORAL at 09:53

## 2020-08-01 RX ADMIN — ALUMINUM HYDROXIDE, MAGNESIUM HYDROXIDE, AND SIMETHICONE 30 ML: 200; 200; 20 SUSPENSION ORAL at 20:23

## 2020-08-01 RX ADMIN — APIXABAN 5 MG: 5 TABLET, FILM COATED ORAL at 20:23

## 2020-08-01 RX ADMIN — APIXABAN 5 MG: 5 TABLET, FILM COATED ORAL at 09:53

## 2020-08-01 RX ADMIN — METFORMIN HYDROCHLORIDE 500 MG: 500 TABLET ORAL at 09:53

## 2020-08-01 RX ADMIN — MORPHINE SULFATE 2 MG: 2 INJECTION, SOLUTION INTRAMUSCULAR; INTRAVENOUS at 10:47

## 2020-08-01 RX ADMIN — MORPHINE SULFATE 4 MG: 4 INJECTION, SOLUTION INTRAMUSCULAR; INTRAVENOUS at 05:52

## 2020-08-01 RX ADMIN — HYDROCODONE BITARTRATE AND ACETAMINOPHEN 2 TABLET: 5; 325 TABLET ORAL at 09:53

## 2020-08-01 RX ADMIN — METFORMIN HYDROCHLORIDE 500 MG: 500 TABLET ORAL at 16:48

## 2020-08-01 RX ADMIN — Medication 10 ML: at 21:27

## 2020-08-01 RX ADMIN — VANCOMYCIN HYDROCHLORIDE 1250 MG: 1.25 INJECTION, POWDER, LYOPHILIZED, FOR SOLUTION INTRAVENOUS at 20:23

## 2020-08-01 RX ADMIN — SODIUM CHLORIDE 3 G: 900 INJECTION INTRAVENOUS at 16:48

## 2020-08-01 RX ADMIN — GUAIFENESIN 600 MG: 600 TABLET, EXTENDED RELEASE ORAL at 20:23

## 2020-08-01 RX ADMIN — MAGNESIUM HYDROXIDE 30 ML: 400 SUSPENSION ORAL at 02:06

## 2020-08-01 RX ADMIN — ASPIRIN 81 MG 81 MG: 81 TABLET ORAL at 09:53

## 2020-08-01 RX ADMIN — INSULIN GLARGINE 35 UNITS: 100 INJECTION, SOLUTION SUBCUTANEOUS at 22:26

## 2020-08-01 RX ADMIN — ONDANSETRON 4 MG: 2 INJECTION INTRAMUSCULAR; INTRAVENOUS at 06:56

## 2020-08-01 RX ADMIN — ATORVASTATIN CALCIUM 40 MG: 40 TABLET, FILM COATED ORAL at 20:23

## 2020-08-01 ASSESSMENT — PAIN SCALES - GENERAL
PAINLEVEL_OUTOF10: 0
PAINLEVEL_OUTOF10: 7
PAINLEVEL_OUTOF10: 0
PAINLEVEL_OUTOF10: 8
PAINLEVEL_OUTOF10: 6
PAINLEVEL_OUTOF10: 0
PAINLEVEL_OUTOF10: 4
PAINLEVEL_OUTOF10: 7
PAINLEVEL_OUTOF10: 8

## 2020-08-01 ASSESSMENT — PAIN DESCRIPTION - PROGRESSION

## 2020-08-01 ASSESSMENT — ENCOUNTER SYMPTOMS
NAUSEA: 1
ABDOMINAL PAIN: 0
SHORTNESS OF BREATH: 0
VOMITING: 0
COUGH: 1

## 2020-08-01 ASSESSMENT — PAIN DESCRIPTION - DESCRIPTORS: DESCRIPTORS: ACHING;SHARP

## 2020-08-01 ASSESSMENT — PAIN DESCRIPTION - LOCATION: LOCATION: FOOT

## 2020-08-01 ASSESSMENT — PAIN DESCRIPTION - FREQUENCY: FREQUENCY: CONTINUOUS

## 2020-08-01 ASSESSMENT — PAIN DESCRIPTION - ONSET: ONSET: ON-GOING

## 2020-08-01 ASSESSMENT — PAIN DESCRIPTION - PAIN TYPE: TYPE: ACUTE PAIN

## 2020-08-01 ASSESSMENT — PAIN - FUNCTIONAL ASSESSMENT: PAIN_FUNCTIONAL_ASSESSMENT: PREVENTS OR INTERFERES SOME ACTIVE ACTIVITIES AND ADLS

## 2020-08-01 ASSESSMENT — PAIN DESCRIPTION - ORIENTATION: ORIENTATION: LEFT

## 2020-08-01 NOTE — PROGRESS NOTES
Pharmacy Note  Vancomycin Consult - Brief Note     Vancomycin Day: 6  Current Dose:  Vancomycin 1250mg IV q 12 hours  Patient's labs, cultures, vitals, and vancomycin regimen reviewed. No changes today. Current diagnosis for which MRSA is suspected/confirmed: diabetic foot infection  ONLY for suspected pneumonia or COPD: MRSA nasal swab  N/A: Non respiratory infection. .        TEMP: 98.6  Calculated CrCl based on IBW:  118  mL/min    Recent Labs     07/31/20 0521 08/01/20  0536   WBC 12.9* 11.4*     Recent Labs     07/31/20 0521 08/01/20 0536   CREATININE 0.76 0.69*     Estimated Creatinine Clearance: 118 mL/min (A) (based on SCr of 0.69 mg/dL (L)). Intake/Output Summary (Last 24 hours) at 8/1/2020 0710  Last data filed at 8/1/2020 0538  Gross per 24 hour   Intake 1948 ml   Output 2875 ml   Net -927 ml         Thank you for the consult. Pharmacy will continue to follow.   Ambrose Foster [facility]/PharmD  8/1/2020 7:10 AM

## 2020-08-01 NOTE — PROGRESS NOTES
Progress Note  Podiatric Medicine and Surgery     Subjective     CC: Left toe wound    Patient seen and examined at bedside,  Afebrile  Pain controlled   denies any N/V/F/C/CP/SOB      HPI :  Saranya Fairbanks is a 60 y.o. male seen at [facility] emergency department. Patient states that he was walking around in his shoe, when he noticed that it was bleeding through. Patient reports that he usually only wear socks, because he knows that this can be a problem. Patient has a history of a right BKA, left fifth ray resection. Patient also has a history of uncontrolled diabetes. Patient reported that he has had his blood sugars ranging between 200 and 600. Patient also has a well-known history of poor compliance, and leaving the emergency department AMA. Patient does not have a podiatrist he follows with. Patient denies any other pedal complaints       ROS: Denies N/V/F/C/SOB/CP. Otherwise negative except at stated in the HPI.      Medications:  Scheduled Meds:   guaiFENesin  600 mg Oral BID    insulin glargine  70 Units Subcutaneous Nightly    ampicillin-sulbactam  3 g Intravenous Q6H    clopidogrel  75 mg Oral Daily    pantoprazole  40 mg Oral QAM AC    sodium chloride flush  10 mL Intravenous 2 times per day    atorvastatin  40 mg Oral Nightly    metFORMIN  500 mg Oral BID WC    aspirin  81 mg Oral Daily    apixaban  5 mg Oral BID    insulin lispro  0-18 Units Subcutaneous TID WC    insulin lispro  0-9 Units Subcutaneous Nightly    vancomycin  1,250 mg Intravenous Q12H    vancomycin (VANCOCIN) intermittent dosing (placeholder)   Other RX Placeholder    fluticasone  2 spray Each Nostril Daily       Continuous Infusions:   sodium chloride 75 mL/hr at 07/31/20 2028    dextrose         PRN Meds:magnesium hydroxide, aluminum & magnesium hydroxide-simethicone, benzonatate, bisacodyl, acetaminophen, HYDROcodone 5 mg - acetaminophen **OR** HYDROcodone 5 mg - acetaminophen, morphine **OR** morphine, ondansetron, ipratropium-albuterol, hydrALAZINE, sodium chloride flush, potassium chloride **OR** potassium alternative oral replacement **OR** potassium chloride, magnesium sulfate, polyethylene glycol, promethazine **OR** [DISCONTINUED] ondansetron, nicotine, glucose, dextrose, glucagon (rDNA), dextrose, diphenhydrAMINE, calcium carbonate    Objective     Vitals:  Patient Vitals for the past 8 hrs:   BP Temp Temp src Pulse Resp SpO2 Weight   20 0709 (!) 155/74 97.9 °F (36.6 °C) Oral 89 16 94 % --   20 0622 124/61 98.6 °F (37 °C) Oral 85 17 95 % --   20 0417 135/65 98.4 °F (36.9 °C) Oral 82 17 95 % 166 lb (75.3 kg)   20 0030 (!) 152/72 98.1 °F (36.7 °C) Oral 90 16 96 % --     Average, Min, and Max for last 24 hours Vitals:  TEMPERATURE:  Temp  Av.1 °F (36.7 °C)  Min: 97.7 °F (36.5 °C)  Max: 98.6 °F (37 °C)    RESPIRATIONS RANGE: Resp  Av  Min: 14  Max: 17    PULSE RANGE: Pulse  Av.4  Min: 82  Max: 101    BLOOD PRESSURE RANGE:  Systolic (95DUN), GNX:432 , Min:124 , ELY:927   ; Diastolic (98BOI), KUR:39, Min:61, Max:77      PULSE OXIMETRY RANGE: SpO2  Av.6 %  Min: 92 %  Max: 96 %    I/O last 3 completed shifts: In:  [I.V.:]  Out: 2875 [Urine:2875]    CBC:  Recent Labs     20   WBC 10.8 12.9* 11.4*   HGB 10.1* 10.7* 10.2*   HCT 33.4* 35.5* 33.5*    456* 392   CRP  --  64.5*  --         BMP:  Recent Labs     20  0521 20  0536    142 145*   K 3.9 4.3 4.0    105 104   CO2 27 29 30   BUN 16 16 14   CREATININE 0.74 0.76 0.69*   GLUCOSE 73 64* 109*   CALCIUM 8.5* 8.8 8.6        Coags:  Recent Labs     20  0923   INR 1.0       Lab Results   Component Value Date    SEDRATE 91 (H) 2020     Recent Labs     20  0521   CRP 64.5*       Lower Extremity Physical Exam:  Vascular:  DP nonpalpable, PT palpable LLE. Hair growth is absent to the level of the digits.   No edema. Left leg is warm to cold of the left lower extremity. Left hallux is cool to the touch.     Neuro: Saph/sural/SP/DP/plantar sensation  absent to light touch.       Musculoskeletal: Muscle strength testing was deferred due to patient's current state. Decreased range of motion noted to the ankle Gross deformity is left partial fifth ray resection and Right below the knee amputation      Dermatologic: Left hallux is necrotic circumferentially with dorsal central area of viable skin. Increased maceration on lateral aspect of left hallux. Erythema noted proximal to the hallux to the dorsum of the foot with associated increase in warmth. Some malodor still present     Full thickness ulceration #1 noted to the plantar medial aspect of the right 1st MT, measures 0.1 x 0.1 x 0.2. Periwound is hyperkeratotic, no erythema or drainage noted. No increase in warmth, fluctuance, sinus tracking, or induration appreciated     Wound on the plantar lateral aspect of the fourth distal digit appears with eschar, macerated wound border, necrotic wound base. No periwound erythema, no drainage, no fluctuance appreciated. Does not probe to bone no sinus tracking no undermining. Clinical Images:  None          Imaging:   VL Arterial PVR Lower   Final Result      MRI FOOT LEFT W WO CONTRAST   Final Result   Osteomyelitis of the 4th distal and middle phalanges with an associated skin   ulceration. Cellulitis of the 4th ray, extending along the dorsal foot. IR ANGIOGRAM EXTREMITY LEFT   Final Result      XR FOOT LEFT (MIN 3 VIEWS)   Final Result   Soft tissue swelling of the 1st ray with some associated punctate radiopaque   foreign bodies. Resorption and/or amputation of the 5th ray some associated soft tissue   changes. Cultures: none    Assessment   Kalpesh Muñoz is a 60 y.o. male with   1. Osteomyelitis proximal phalanx of hallux, distal and middle phalanx of fourth toe left foot  2.  Dry gangrene, left hallux  3. Cellulitis, left foot  4. S/p partial 5th ray amputation, left foot  5. S/p Right BKA  6. PAD  7. Uncontrolled type 2 diabetes    Principal Problem:    Uncontrolled type 2 diabetes mellitus with foot ulcer ([facility]  Active Problems:    Tobacco dependence    COPD (chronic obstructive pulmonary disease) ([facility])    Dyslipidemia    Peripheral artery disease (HCC)    Essential hypertension    Azotemia    Anemia, normocytic normochromic    Osteomyelitis of fourth phalange of left foot ([facility]  Resolved Problems:    * No resolved hospital problems. *       Plan     · Patient examined and evaluated at bedside with Dr. Paul Bernard  · Treatment options discussed in detail with the patient  · Medical management per primary  · Abx: Vancomycin/Unasyn, ID following  · vasc - post angio 2 vessel run off   · Reviewed MRI-suspicion for left foot osteomyelitis of proximal phalanx hallux, distal and middle phalanx left fourth toe  · Vascular input noted -- rec TMA  · OR on 8/3/2020 for Left TMA timing to TBD  · NPO Sunday 8/2 at [facility]  · Consent signed and in the chart. · COVID test ordered. · Dressings applied to left leg include: Betadine wet-to-dry, light Kerlix. · WBAT to the left foot in a surgical shoe. · Discussed with Dr. Paul Bernard.     Nica Mcgowan DPM   Podiatric Medicine & Surgery   8/1/2020 at 7:50 AM

## 2020-08-01 NOTE — PROGRESS NOTES
VASCULAR SURGERY  PROGRESS NOTE      8/1/2020 5:07 PM  Subjective:   Admit Date: 7/27/2020  PCP: Ck King DO    Chief Complaint   Patient presents with    Leg Pain    Foot Pain     left     Interval History: No complaints. Plans for TMA noted. HILARY normal on the left post atherectomy and DCB angioplasty. Diet: DIET GENERAL; Carb Control: 5 carb choices (75 gms)/meal  Diet NPO, After Midnight    Medications:   Scheduled Meds:   guaiFENesin  600 mg Oral BID    insulin glargine  70 Units Subcutaneous Nightly    ampicillin-sulbactam  3 g Intravenous Q6H    clopidogrel  75 mg Oral Daily    pantoprazole  40 mg Oral QAM AC    sodium chloride flush  10 mL Intravenous 2 times per day    atorvastatin  40 mg Oral Nightly    metFORMIN  500 mg Oral BID WC    aspirin  81 mg Oral Daily    apixaban  5 mg Oral BID    insulin lispro  0-18 Units Subcutaneous TID WC    insulin lispro  0-9 Units Subcutaneous Nightly    vancomycin  1,250 mg Intravenous Q12H    vancomycin (VANCOCIN) intermittent dosing (placeholder)   Other RX Placeholder    fluticasone  2 spray Each Nostril Daily     Continuous Infusions:   sodium chloride 75 mL/hr at 07/31/20 2028    dextrose           Labs:   CBC:   Recent Labs     07/30/20  0526 07/31/20  0521 08/01/20  0536   WBC 10.8 12.9* 11.4*   HGB 10.1* 10.7* 10.2*    456* 392     BMP:    Recent Labs     07/30/20  0526 07/31/20  0521 08/01/20  0536    142 145*   K 3.9 4.3 4.0    105 104   CO2 27 29 30   BUN 16 16 14   CREATININE 0.74 0.76 0.69*   GLUCOSE 73 64* 109*     Hepatic: No results for input(s): AST, ALT, ALB, BILITOT, ALKPHOS in the last 72 hours. Troponin: Invalid input(s): TROPONIN  BNP: No results for input(s): BNP in the last 72 hours. Lipids: No results for input(s): CHOL, HDL in the last 72 hours. Invalid input(s): LDLCALCU  INR:   No results for input(s): INR in the last 72 hours.     Objective:   Vitals: BP (!) 151/77   Pulse 99   Temp 97.9 °F (36.6 °C) (Oral)   Resp 17   Ht 6' 1\" (1.854 m)   Wt 166 lb (75.3 kg)   SpO2 97%   BMI 21.90 kg/m²   General appearance: alert, cooperative and no distress  Mental Status: oriented to person, place and time with normal affect  Neck: good carotid pulses, no JVD  Lungs: clear to auscultation bilaterally, normal effort  Heart: regular rate and rhythm, no murmur,  Abdomen: soft, non-tender, non-distended, bowel sounds present all four quadrants, no masses, hepatomegaly, splenomegaly or aortic enlargement  Extremities: Left hallux gangrene, palpable left posterior tibial pulse, right below-knee amputation  Skin: no gross lesions, rashes, or induration    Assessment:   3 68-year-old diabetic male doing well status post left lower extremity atherectomy and DCB angioplasty    Patient Active Problem List:     Type 2 diabetes mellitus with diabetic polyneuropathy, with long-term current use of insulin ([facility])     Neuropathic pain, leg     Diabetic polyneuropathy ([facility])     Allergic rhinitis     Tobacco dependence     COPD (chronic obstructive pulmonary disease) ([facility])     Edentulous     Dysphagia     Lung nodules     Esophageal cancer (HCC)     Fracture of humerus, left, closed     Closed fracture of humerus     DM type 2 with diabetic peripheral neuropathy (HCC)     Chronic obstructive pulmonary disease (HCC)     Dyslipidemia     Gastroesophageal reflux disease     Chronic pain syndrome     Marijuana use     History of colon polyps     Cellulitis of right heel     PVD (peripheral vascular disease) ([facility])     Functional diarrhea     Sepsis due to methicillin resistant Staphylococcus aureus (MRSA) ([facility])     History of esophageal cancer     Osteomyelitis, chronic, ankle or foot ([address]     Peripheral artery disease ([address]     Other pulmonary embolism without acute cor pulmonale (HCC)     Carotid stenosis, asymptomatic, bilateral     Lower limb amputation status     Fx humeral neck, right, closed, initial encounter Transient hypotension     Constipation due to opioid therapy     Acute kidney injury ([address]     Diabetic ulcer of left midfoot associated with type 2 diabetes mellitus, with fat layer exposed ([address]     Complication of below knee amputation stump (HCC)     COPD exacerbation (HCC)     Hyponatremia     Pain, phantom limb ([address]     Below-knee amputation of right lower extremity (HCC)     Hyperglycemia     Moderate protein malnutrition ([address]     Essential hypertension     Uncontrolled type 2 diabetes mellitus with hyperglycemia ([address]     History of pulmonary embolism - 2017     Atelectasis     Uncontrolled type 2 diabetes mellitus with foot ulcer (HCC)     Azotemia     Anemia, normocytic normochromic      Plan:   1. Lower extremity PVR study status post atherectomy  2. Continue antibiotics  3.  Left TMA Monday per podiatry    Electronically signed by Wanda Schaefer MD on 8/1/2020 at 5:07 PM

## 2020-08-01 NOTE — PROGRESS NOTES
Pt. Refusing to wear ortho shoe on left foot despite foot bleeding when pt is up and walking. Dressing changed to site once pt returned to bed. Pt refusing ace wrap and silver alginate to left knee as pt says its too thick for his prosthetic. Pt agreeable to enema, given with minimal results. Pt agreeable to surgery, podiatry resident to come and speak with pt. Pt will not do tums nor any other stool softner/ prune juice to help with constipation.

## 2020-08-01 NOTE — PROGRESS NOTES
Pt. Has a slight skin shearing on coccyx from hitting tail bone on toilet. Pt not calling out for help, pt refusing to get out of soiled shorts that have been worn since 7/27/20. Barrier cream to site, pt offered scrub pants. Linens changed.

## 2020-08-01 NOTE — PROGRESS NOTES
HBG 11.3, HCT 37.6.     X-ray left foot indicates soft tissue swelling of the first ray with some associated punctate radiopaque foreign bodies. Reabsorption and/or amputation of the fifth ray, some associated soft tissue changes. \"     Subsequent database included:  Sodium 134Low     BUN 26High     The patient has been admitted  Vascular surgery consulted  Podiatry consulted  Blood sugars will be monitored and controlled  Electrolyte abnormalities will be addressed  Renal function will be observed  Antibiotics per Infectious Disease service     On 7/29 the patient underwent:  PROCEDURES PERFORMED:  1. Aortogram with selective left lower extremity runoff (third-order  selective). 2.  Left superficial femoral, popliteal, tibioperoneal trunk and  posterior tibial artery atherectomy with Jetstream 2.4 and 1.85 mm  catheters. 3.  Left popliteal, tibioperoneal trunk, and posterior tibial artery  angioplasty with [place] 3 mm x 220 mm balloon. 4.  Left superficial femoral, popliteal, and tibioperoneal trunk  drug-coated balloon angioplasty with 4 mm x 250 mm Admiral balloon. 5.  Left superficial femoral and popliteal artery DCB angioplasty with  Admiral 5 mm x 250 mm balloons x2.  6.  Left common and external iliac artery angioplasty with [place] 7 mm  x 40 mm balloon. 7.  Ultrasound-guided access, right common femoral artery. 8.  Placement of right femoral Mynx closure device. 9.  Conscious sedation. Summary       Left HILARY of 1.17 is within normal limits. Podiatry and vascular has recommended amputation  The patient now consents to surgery    Medications: Allergies:     Allergies   Allergen Reactions    Gabapentin Other (See Comments)     dizziness       Current Meds:   Scheduled Meds:    guaiFENesin  600 mg Oral BID    insulin glargine  70 Units Subcutaneous Nightly    ampicillin-sulbactam  3 g Intravenous Q6H    clopidogrel  75 mg Oral Daily    pantoprazole  40 mg Oral QAM AC    sodium Father     Cancer Sister     Alcohol Abuse Maternal Aunt     Alcohol Abuse Maternal Uncle     Alcohol Abuse Paternal Aunt        Vitals:  BP (!) 151/77   Pulse 99   Temp 97.9 °F (36.6 °C) (Oral)   Resp 17   Ht 6' 1\" (1.854 m)   Wt 166 lb (75.3 kg)   SpO2 97%   BMI 21.90 kg/m²   Temp (24hrs), Av.1 °F (36.7 °C), Min:97.7 °F (36.5 °C), Max:98.6 °F (37 °C)    Recent Labs     20  1949 20  0201 20  0622 20  0656   POCGLU 309* 177* 84 80       I/O (24Hr): Intake/Output Summary (Last 24 hours) at 2020 1619  Last data filed at 2020 0538  Gross per 24 hour   Intake 1948 ml   Output 1625 ml   Net 323 ml         Review of Systems:     Review of Systems   Constitutional: Positive for activity change (Diminished). Negative for chills, diaphoresis and fatigue. Respiratory: Positive for cough (Nonproductive). Negative for shortness of breath. Cardiovascular: Negative for chest pain and palpitations. Gastrointestinal: Positive for nausea (After eating too much for breakfast). Negative for abdominal pain and vomiting. He reports occasional reflux symptoms  The patient reports that oral pain meds make him sick and vomit  He is requesting IV pain meds   Genitourinary: Negative for flank pain and hematuria. Musculoskeletal: Positive for arthralgias and myalgias. Patient continues to have left toe/foot pain   Skin: Positive for wound (See chief complaint). Neurological: Positive for numbness (Known diabetic peripheral neuropathy). Chronic phantom pain from his right BKA       Physical Examination:        Physical Exam  Vitals signs reviewed. Constitutional:       General: He is not in acute distress. Appearance: He is not diaphoretic. HENT:      Head: Normocephalic. Nose: Nose normal.   Eyes:      General: No scleral icterus. Conjunctiva/sclera: Conjunctivae normal.   Neck:      Musculoskeletal: Neck supple.       Trachea: No tracheal

## 2020-08-02 LAB
ABSOLUTE EOS #: 0.42 K/UL (ref 0–0.44)
ABSOLUTE IMMATURE GRANULOCYTE: 0.07 K/UL (ref 0–0.3)
ABSOLUTE LYMPH #: 2.06 K/UL (ref 1.1–3.7)
ABSOLUTE MONO #: 1.01 K/UL (ref 0.1–1.2)
ANION GAP SERPL CALCULATED.3IONS-SCNC: 9 MMOL/L (ref 9–17)
BASOPHILS # BLD: 1 % (ref 0–2)
BASOPHILS ABSOLUTE: 0.07 K/UL (ref 0–0.2)
BUN BLDV-MCNC: 19 MG/DL (ref 8–23)
BUN/CREAT BLD: 19 (ref 9–20)
C-REACTIVE PROTEIN: 72.8 MG/L (ref 0–5)
CALCIUM SERPL-MCNC: 8.5 MG/DL (ref 8.6–10.4)
CHLORIDE BLD-SCNC: 101 MMOL/L (ref 98–107)
CO2: 30 MMOL/L (ref 20–31)
CREAT SERPL-MCNC: 0.99 MG/DL (ref 0.7–1.2)
DIFFERENTIAL TYPE: ABNORMAL
EOSINOPHILS RELATIVE PERCENT: 3 % (ref 1–4)
GFR AFRICAN AMERICAN: >60 ML/MIN
GFR NON-AFRICAN AMERICAN: >60 ML/MIN
GFR SERPL CREATININE-BSD FRML MDRD: ABNORMAL ML/MIN/{1.73_M2}
GFR SERPL CREATININE-BSD FRML MDRD: ABNORMAL ML/MIN/{1.73_M2}
GLUCOSE BLD-MCNC: 132 MG/DL (ref 75–110)
GLUCOSE BLD-MCNC: 137 MG/DL (ref 75–110)
GLUCOSE BLD-MCNC: 162 MG/DL (ref 75–110)
GLUCOSE BLD-MCNC: 211 MG/DL (ref 75–110)
GLUCOSE BLD-MCNC: 99 MG/DL (ref 70–99)
HCT VFR BLD CALC: 35.6 % (ref 40.7–50.3)
HEMOGLOBIN: 10.6 G/DL (ref 13–17)
IMMATURE GRANULOCYTES: 1 %
LYMPHOCYTES # BLD: 17 % (ref 24–43)
MCH RBC QN AUTO: 29.2 PG (ref 25.2–33.5)
MCHC RBC AUTO-ENTMCNC: 29.8 G/DL (ref 28.4–34.8)
MCV RBC AUTO: 98.1 FL (ref 82.6–102.9)
MONOCYTES # BLD: 8 % (ref 3–12)
NRBC AUTOMATED: 0 PER 100 WBC
PDW BLD-RTO: 12.4 % (ref 11.8–14.4)
PLATELET # BLD: 487 K/UL (ref 138–453)
PLATELET ESTIMATE: ABNORMAL
PMV BLD AUTO: 8.9 FL (ref 8.1–13.5)
POTASSIUM SERPL-SCNC: 4.4 MMOL/L (ref 3.7–5.3)
RBC # BLD: 3.63 M/UL (ref 4.21–5.77)
RBC # BLD: ABNORMAL 10*6/UL
SEG NEUTROPHILS: 70 % (ref 36–65)
SEGMENTED NEUTROPHILS ABSOLUTE COUNT: 8.61 K/UL (ref 1.5–8.1)
SODIUM BLD-SCNC: 140 MMOL/L (ref 135–144)
WBC # BLD: 12.2 K/UL (ref 3.5–11.3)
WBC # BLD: ABNORMAL 10*3/UL

## 2020-08-02 PROCEDURE — 6360000002 HC RX W HCPCS: Performed by: SURGERY

## 2020-08-02 PROCEDURE — 2580000003 HC RX 258: Performed by: SURGERY

## 2020-08-02 PROCEDURE — 6370000000 HC RX 637 (ALT 250 FOR IP): Performed by: SURGERY

## 2020-08-02 PROCEDURE — 85025 COMPLETE CBC W/AUTO DIFF WBC: CPT

## 2020-08-02 PROCEDURE — 82947 ASSAY GLUCOSE BLOOD QUANT: CPT

## 2020-08-02 PROCEDURE — 86140 C-REACTIVE PROTEIN: CPT

## 2020-08-02 PROCEDURE — 99231 SBSQ HOSP IP/OBS SF/LOW 25: CPT | Performed by: INTERNAL MEDICINE

## 2020-08-02 PROCEDURE — 36415 COLL VENOUS BLD VENIPUNCTURE: CPT

## 2020-08-02 PROCEDURE — 80048 BASIC METABOLIC PNL TOTAL CA: CPT

## 2020-08-02 PROCEDURE — 6370000000 HC RX 637 (ALT 250 FOR IP): Performed by: INTERNAL MEDICINE

## 2020-08-02 PROCEDURE — 1200000000 HC SEMI PRIVATE

## 2020-08-02 PROCEDURE — 2500000003 HC RX 250 WO HCPCS: Performed by: NURSE PRACTITIONER

## 2020-08-02 PROCEDURE — 99232 SBSQ HOSP IP/OBS MODERATE 35: CPT | Performed by: INTERNAL MEDICINE

## 2020-08-02 RX ADMIN — FAMOTIDINE 20 MG: 10 INJECTION, SOLUTION INTRAVENOUS at 07:41

## 2020-08-02 RX ADMIN — APIXABAN 5 MG: 5 TABLET, FILM COATED ORAL at 07:41

## 2020-08-02 RX ADMIN — MORPHINE SULFATE 4 MG: 4 INJECTION, SOLUTION INTRAMUSCULAR; INTRAVENOUS at 22:50

## 2020-08-02 RX ADMIN — INSULIN LISPRO 3 UNITS: 100 INJECTION, SOLUTION INTRAVENOUS; SUBCUTANEOUS at 12:42

## 2020-08-02 RX ADMIN — HYDROCODONE BITARTRATE AND ACETAMINOPHEN 2 TABLET: 5; 325 TABLET ORAL at 21:13

## 2020-08-02 RX ADMIN — GUAIFENESIN 600 MG: 600 TABLET, EXTENDED RELEASE ORAL at 20:45

## 2020-08-02 RX ADMIN — INSULIN LISPRO 6 UNITS: 100 INJECTION, SOLUTION INTRAVENOUS; SUBCUTANEOUS at 17:10

## 2020-08-02 RX ADMIN — SODIUM CHLORIDE 3 G: 900 INJECTION INTRAVENOUS at 12:43

## 2020-08-02 RX ADMIN — FAMOTIDINE 20 MG: 10 INJECTION, SOLUTION INTRAVENOUS at 00:06

## 2020-08-02 RX ADMIN — GUAIFENESIN 600 MG: 600 TABLET, EXTENDED RELEASE ORAL at 07:40

## 2020-08-02 RX ADMIN — MORPHINE SULFATE 4 MG: 4 INJECTION, SOLUTION INTRAMUSCULAR; INTRAVENOUS at 12:54

## 2020-08-02 RX ADMIN — VANCOMYCIN HYDROCHLORIDE 1250 MG: 1.25 INJECTION, POWDER, LYOPHILIZED, FOR SOLUTION INTRAVENOUS at 07:42

## 2020-08-02 RX ADMIN — METFORMIN HYDROCHLORIDE 500 MG: 500 TABLET ORAL at 07:40

## 2020-08-02 RX ADMIN — SODIUM CHLORIDE 3 G: 900 INJECTION INTRAVENOUS at 05:57

## 2020-08-02 RX ADMIN — HYDROCODONE BITARTRATE AND ACETAMINOPHEN 2 TABLET: 5; 325 TABLET ORAL at 11:55

## 2020-08-02 RX ADMIN — HYDROCODONE BITARTRATE AND ACETAMINOPHEN 2 TABLET: 5; 325 TABLET ORAL at 17:11

## 2020-08-02 RX ADMIN — METFORMIN HYDROCHLORIDE 500 MG: 500 TABLET ORAL at 17:11

## 2020-08-02 RX ADMIN — FLUTICASONE PROPIONATE 2 SPRAY: 50 SPRAY, METERED NASAL at 07:47

## 2020-08-02 RX ADMIN — VANCOMYCIN HYDROCHLORIDE 1250 MG: 1.25 INJECTION, POWDER, LYOPHILIZED, FOR SOLUTION INTRAVENOUS at 21:06

## 2020-08-02 RX ADMIN — ALUMINUM HYDROXIDE, MAGNESIUM HYDROXIDE, AND SIMETHICONE 30 ML: 200; 200; 20 SUSPENSION ORAL at 17:22

## 2020-08-02 RX ADMIN — ASPIRIN 81 MG 81 MG: 81 TABLET ORAL at 07:41

## 2020-08-02 RX ADMIN — MORPHINE SULFATE 4 MG: 4 INJECTION, SOLUTION INTRAMUSCULAR; INTRAVENOUS at 07:41

## 2020-08-02 RX ADMIN — Medication 10 ML: at 12:54

## 2020-08-02 RX ADMIN — SODIUM CHLORIDE 3 G: 900 INJECTION INTRAVENOUS at 17:11

## 2020-08-02 RX ADMIN — ATORVASTATIN CALCIUM 40 MG: 40 TABLET, FILM COATED ORAL at 20:45

## 2020-08-02 RX ADMIN — HYDROCODONE BITARTRATE AND ACETAMINOPHEN 2 TABLET: 5; 325 TABLET ORAL at 06:14

## 2020-08-02 RX ADMIN — Medication 10 ML: at 18:17

## 2020-08-02 RX ADMIN — FAMOTIDINE 20 MG: 10 INJECTION, SOLUTION INTRAVENOUS at 20:52

## 2020-08-02 RX ADMIN — Medication 10 ML: at 17:11

## 2020-08-02 RX ADMIN — PANTOPRAZOLE SODIUM 40 MG: 40 TABLET, DELAYED RELEASE ORAL at 06:14

## 2020-08-02 RX ADMIN — MORPHINE SULFATE 4 MG: 4 INJECTION, SOLUTION INTRAMUSCULAR; INTRAVENOUS at 03:59

## 2020-08-02 RX ADMIN — CLOPIDOGREL BISULFATE 75 MG: 75 TABLET ORAL at 07:58

## 2020-08-02 RX ADMIN — MORPHINE SULFATE 4 MG: 4 INJECTION, SOLUTION INTRAMUSCULAR; INTRAVENOUS at 18:17

## 2020-08-02 RX ADMIN — Medication 10 ML: at 07:41

## 2020-08-02 RX ADMIN — Medication 10 ML: at 20:53

## 2020-08-02 ASSESSMENT — PAIN SCALES - GENERAL
PAINLEVEL_OUTOF10: 8
PAINLEVEL_OUTOF10: 7
PAINLEVEL_OUTOF10: 7
PAINLEVEL_OUTOF10: 8

## 2020-08-02 ASSESSMENT — ENCOUNTER SYMPTOMS
COUGH: 1
ALLERGIC/IMMUNOLOGIC NEGATIVE: 1
NAUSEA: 0
RESPIRATORY NEGATIVE: 1
CONSTIPATION: 1
VOMITING: 0
GASTROINTESTINAL NEGATIVE: 1
SHORTNESS OF BREATH: 0
ABDOMINAL PAIN: 0

## 2020-08-02 ASSESSMENT — PAIN DESCRIPTION - ORIENTATION
ORIENTATION: POSTERIOR
ORIENTATION: LEFT
ORIENTATION: POSTERIOR
ORIENTATION: POSTERIOR
ORIENTATION: LEFT
ORIENTATION: POSTERIOR
ORIENTATION: POSTERIOR

## 2020-08-02 ASSESSMENT — PAIN DESCRIPTION - LOCATION
LOCATION: BACK;NECK
LOCATION: FOOT;BACK
LOCATION: FOOT
LOCATION: BACK;NECK
LOCATION: BACK;NECK
LOCATION: NECK;BACK
LOCATION: BACK;NECK

## 2020-08-02 ASSESSMENT — PAIN DESCRIPTION - ONSET
ONSET: ON-GOING

## 2020-08-02 ASSESSMENT — PAIN DESCRIPTION - DESCRIPTORS
DESCRIPTORS: ACHING;SHARP
DESCRIPTORS: ACHING
DESCRIPTORS: ACHING
DESCRIPTORS: ACHING;SHARP
DESCRIPTORS: ACHING

## 2020-08-02 ASSESSMENT — PAIN - FUNCTIONAL ASSESSMENT
PAIN_FUNCTIONAL_ASSESSMENT: PREVENTS OR INTERFERES SOME ACTIVE ACTIVITIES AND ADLS

## 2020-08-02 ASSESSMENT — PAIN DESCRIPTION - FREQUENCY
FREQUENCY: CONTINUOUS

## 2020-08-02 ASSESSMENT — PAIN DESCRIPTION - PROGRESSION
CLINICAL_PROGRESSION: GRADUALLY WORSENING
CLINICAL_PROGRESSION: NOT CHANGED
CLINICAL_PROGRESSION: NOT CHANGED
CLINICAL_PROGRESSION: GRADUALLY WORSENING

## 2020-08-02 ASSESSMENT — PAIN DESCRIPTION - PAIN TYPE
TYPE: ACUTE PAIN

## 2020-08-02 NOTE — PROGRESS NOTES
Pharmacy Note  Vancomycin Consult - Brief Note     Vancomycin Day: 7  Current Dose:  Vancomycin 1250mg IV q 12 hours  Patient's labs, cultures, vitals, and vancomycin regimen reviewed. No changes today. Plans for transmetatarsal amputation of left foot for tomorrow - Monday noted. Current diagnosis for which MRSA is suspected/confirmed: diabetic foot inection  ONLY for suspected pneumonia or COPD: MRSA nasal swab  N/A: Non respiratory infection. .        TEMP: 99.1  Calculated CrCl based on IBW:  86  mL/min    Recent Labs     08/01/20  0536 08/02/20  0632   WBC 11.4* 12.2*     Recent Labs     08/01/20  0536 08/02/20  0632   CREATININE 0.69* 0.99     Estimated Creatinine Clearance: 82 mL/min (based on SCr of 0.99 mg/dL). Intake/Output Summary (Last 24 hours) at 8/2/2020 6763  Last data filed at 8/2/2020 0815  Gross per 24 hour   Intake --   Output 850 ml   Net -850 ml         Thank you for the consult. Pharmacy will continue to follow.   Nirmala Rosario RPdemetrio/PharmD  8/2/2020 9:07 AM

## 2020-08-02 NOTE — PLAN OF CARE
Problem: Falls - Risk of:  Goal: Will remain free from falls  Description: Will remain free from falls  8/2/2020 1346 by Tabitha Yin RN  Outcome: Ongoing  Note: Siderails up x 2  Hourly rounding  Call light in reach  Instructed to call for assist before attempting out of bed. Remains free from falls and accidental injury at this time   Floor free from obstacles  Bed is locked and in lowest position  Adequate lighting provided  Bed alarm on, [place] star and Stay with Me signs posted      8/2/2020 0123 by Ancelmo Nicholson RN  Outcome: Ongoing  Goal: Absence of physical injury  Description: Absence of physical injury  8/2/2020 1346 by Tabitha Yin RN  Outcome: Ongoing  8/2/2020 0123 by Ancelmo Nicholson RN  Outcome: Ongoing     Problem: Pain:  Description: Pain management should include both nonpharmacologic and pharmacologic interventions. Goal: Pain level will decrease  Description: Pain level will decrease  8/2/2020 1346 by Tabitha Yin RN  Outcome: Ongoing  Note: Pain level assessment complete.    Patient educated on pain scale and control interventions  PRN pain medication given per patient request  Patient instructed to call out with new onset of pain or unrelieved pain    8/2/2020 0123 by Ancelmo Nicholson RN  Outcome: Ongoing  Goal: Control of acute pain  Description: Control of acute pain  8/2/2020 1346 by Tabitha Yin RN  Outcome: Ongoing  8/2/2020 0123 by Ancelmo Nicholson RN  Outcome: Ongoing  Goal: Control of chronic pain  Description: Control of chronic pain  8/2/2020 1346 by Tabitha Yni RN  Outcome: Ongoing  8/2/2020 0123 by Ancelmo Nicholson RN  Outcome: Ongoing     Problem: Serum Glucose Level - Abnormal:  Goal: Ability to maintain appropriate glucose levels will improve  Description: Ability to maintain appropriate glucose levels will improve  8/2/2020 1346 by Tabitha Yin RN  Outcome: Ongoing  8/2/2020 0123 by Ancelmo Nicholson RN  Outcome: Ongoing     Problem: Skin Integrity:  Goal: Will show no infection signs and symptoms  Description: Will show no infection signs and symptoms  8/2/2020 1346 by Vernon Huertas RN  Outcome: Ongoing  8/2/2020 0123 by Jany Ovalle RN  Outcome: Ongoing  Goal: Absence of new skin breakdown  Description: Absence of new skin breakdown  8/2/2020 1346 by Vernon Huertas RN  Outcome: Ongoing  8/2/2020 0123 by Jany Ovalle RN  Outcome: Ongoing     Problem: Nutrition  Goal: Optimal nutrition therapy  8/2/2020 1346 by Vernon Huertas RN  Outcome: Ongoing  8/2/2020 0123 by Jany Ovalle RN  Outcome: Ongoing

## 2020-08-02 NOTE — PROGRESS NOTES
Pt admitted to room 2025 per in condition from patient oriented x4   Oriented to room and surroundings  Bed in lowest position, wheels locked, 2/4 side rails up  Call light in reach, room free of clutter, adequate lighting provided  Denies any further questions at this time  Instructed to call out with any questions/concerns/new onset of pain and/or n/v   White board updated  Continue to monitor with hourly rounding  STAY WITH ME protocol initiated   Bed alarm on/Fall Risk signs in place/Fall risk sticker to wrist band  Non-skid socks on/at bedside  MEDICATION EDUCATION SHEET in place for patient/family to view & ask questions.

## 2020-08-02 NOTE — PROGRESS NOTES
Progress Note  Podiatric Medicine and Surgery     Subjective     CC: Left toe wound    Patient seen and examined at bedside,  Afebrile  Pain controlled   Denies any N/V/F/C/CP/SOB  OR tomorrow at 10am for left TMA      HPI :  Amina Morales is a 60 y.o. male seen at [facility] emergency department. Patient states that he was walking around in his shoe, when he noticed that it was bleeding through. Patient reports that he usually only wear socks, because he knows that this can be a problem. Patient has a history of a right BKA, left fifth ray resection. Patient also has a history of uncontrolled diabetes. Patient reported that he has had his blood sugars ranging between 200 and 600. Patient also has a well-known history of poor compliance, and leaving the emergency department AMA. Patient does not have a podiatrist he follows with. Patient denies any other pedal complaints       ROS: Denies N/V/F/C/SOB/CP. Otherwise negative except at stated in the HPI.      Medications:  Scheduled Meds:   insulin glargine  35 Units Subcutaneous Nightly    famotidine (PEPCID) injection  20 mg Intravenous BID    guaiFENesin  600 mg Oral BID    ampicillin-sulbactam  3 g Intravenous Q6H    clopidogrel  75 mg Oral Daily    pantoprazole  40 mg Oral QAM AC    sodium chloride flush  10 mL Intravenous 2 times per day    atorvastatin  40 mg Oral Nightly    metFORMIN  500 mg Oral BID WC    aspirin  81 mg Oral Daily    apixaban  5 mg Oral BID    insulin lispro  0-18 Units Subcutaneous TID WC    insulin lispro  0-9 Units Subcutaneous Nightly    vancomycin  1,250 mg Intravenous Q12H    vancomycin (VANCOCIN) intermittent dosing (placeholder)   Other RX Placeholder    fluticasone  2 spray Each Nostril Daily       Continuous Infusions:   dextrose         PRN Meds:magnesium hydroxide, aluminum & magnesium hydroxide-simethicone, benzonatate, bisacodyl, acetaminophen, HYDROcodone 5 mg - acetaminophen **OR** HYDROcodone 5 mg - acetaminophen, morphine **OR** morphine, ondansetron, ipratropium-albuterol, hydrALAZINE, sodium chloride flush, potassium chloride **OR** potassium alternative oral replacement **OR** potassium chloride, magnesium sulfate, polyethylene glycol, promethazine **OR** [DISCONTINUED] ondansetron, nicotine, glucose, dextrose, glucagon (rDNA), dextrose, diphenhydrAMINE, calcium carbonate    Objective     Vitals:  Patient Vitals for the past 8 hrs:   BP Temp Temp src Pulse Resp SpO2 Weight   20 0819 (!) 140/53 99.1 °F (37.3 °C) Oral 101 15 95 % --   20 0400 (!) 142/67 98.5 °F (36.9 °C) Oral 91 18 93 % 165 lb (74.8 kg)   20 0204 (!) 147/67 -- -- 83 -- -- --   20 0145 (!) 157/70 98.4 °F (36.9 °C) Oral 88 18 97 % --     Average, Min, and Max for last 24 hours Vitals:  TEMPERATURE:  Temp  Av.3 °F (36.8 °C)  Min: 97.7 °F (36.5 °C)  Max: 99.1 °F (37.3 °C)    RESPIRATIONS RANGE: Resp  Av.3  Min: 15  Max: 20    PULSE RANGE: Pulse  Av.6  Min: 83  Max: 107    BLOOD PRESSURE RANGE:  Systolic (58NRI), WFL:662 , Min:115 , GUC:596   ; Diastolic (70GFQ), AYR:45, Min:53, Max:77      PULSE OXIMETRY RANGE: SpO2  Av.3 %  Min: 92 %  Max: 97 %    I/O last 3 completed shifts:  In: -   Out: 575 [Urine:575]    CBC:  Recent Labs     20  0521 20  0536 20  0632   WBC 12.9* 11.4* 12.2*   HGB 10.7* 10.2* 10.6*   HCT 35.5* 33.5* 35.6*   * 392 487*   CRP 64.5*  --   --         BMP:  Recent Labs     20  0521 20  0536 20  0632    145* 140   K 4.3 4.0 4.4    104 101   CO2 29 30 30   BUN 16 14 19   CREATININE 0.76 0.69* 0.99   GLUCOSE 64* 109* 99   CALCIUM 8.8 8.6 8.5*        Coags:  No results for input(s): APTT, PROT, INR in the last 72 hours. Lab Results   Component Value Date    SEDRATE 91 (H) 2020     Recent Labs     20  0521   CRP 64.5*       Lower Extremity Physical Exam:  Vascular:  DP nonpalpable, PT palpable LLE.   Hair growth is absent to the level of the digits. No edema. Left leg is warm to cold of the left lower extremity. Left hallux is cool to the touch.     Neuro: Saph/sural/SP/DP/plantar sensation  absent to light touch.       Musculoskeletal: Muscle strength testing was deferred due to patient's current state. Decreased range of motion noted to the ankle Gross deformity is left partial fifth ray resection and Right below the knee amputation      Dermatologic: Left hallux is necrotic circumferentially with dorsal central area of viable skin. Increased maceration on lateral aspect of left hallux. Erythema noted proximal to the hallux to the dorsum of the foot with associated increase in warmth. Some malodor still present     Full thickness ulceration #1 noted to the plantar medial aspect of the right 1st MT, measures 0.1 x 0.1 x 0.2. Periwound is hyperkeratotic, no erythema or drainage noted. No increase in warmth, fluctuance, sinus tracking, or induration appreciated     Wound on the plantar lateral aspect of the fourth distal digit appears with eschar, macerated wound border, necrotic wound base. No periwound erythema, no drainage, no fluctuance appreciated. Does not probe to bone no sinus tracking no undermining. Clinical Images:  None      Imaging:   XR ABDOMEN (KUB) (SINGLE AP VIEW)   Final Result   No evidence of bowel obstruction. VL Arterial PVR Lower   Final Result      MRI FOOT LEFT W WO CONTRAST   Final Result   Osteomyelitis of the 4th distal and middle phalanges with an associated skin   ulceration. Cellulitis of the 4th ray, extending along the dorsal foot. IR ANGIOGRAM EXTREMITY LEFT   Final Result      XR FOOT LEFT (MIN 3 VIEWS)   Final Result   Soft tissue swelling of the 1st ray with some associated punctate radiopaque   foreign bodies. Resorption and/or amputation of the 5th ray some associated soft tissue   changes.              Cultures: none    Assessment   Jeny Ahmadi is a 60 y.o. male with   1. Osteomyelitis proximal phalanx of hallux, distal and middle phalanx of fourth toe left foot  2. Dry gangrene, left hallux  3. Cellulitis, left foot  4. S/p partial 5th ray amputation, left foot  5. S/p Right BKA  6. PAD  7. Uncontrolled type 2 diabetes    Principal Problem:    Uncontrolled type 2 diabetes mellitus with foot ulcer ([facility] [address]  Active Problems:    Tobacco dependence    COPD (chronic obstructive pulmonary disease) ([facility])    Dyslipidemia    Peripheral artery disease (HCC)    Essential hypertension    Azotemia    Anemia, normocytic normochromic    Osteomyelitis of fourth phalange of left foot ([address]  Resolved Problems:    * No resolved hospital problems. *       Plan     · Patient examined and evaluated at bedside with Dr. Jerry Iniguez  · Treatment options discussed in detail with the patient  · Medical management per primary  · Abx: Vancomycin/Unasyn, ID following  · vasc - post angio 2 vessel run off   · Reviewed MRI-suspicion for left foot osteomyelitis of proximal phalanx hallux, distal and middle phalanx left fourth toe  · Vascular input noted -- rec TMA  · OR on 8/3/2020 for Left TMA at 10am with Dr. Jerry Iniguez. · NPO tonight 8/2 at [facility]  · Consent signed and in the chart. · COVID test negative. · Dressings applied to left leg include: Betadine wet-to-dry, light Kerlix. · WBAT to the left foot in a surgical shoe. · Discussed with Dr. Jerry Iniguez.   ·     Kelli Bonilla DPM   Podiatric Medicine & Surgery   8/2/2020 at 8:32 AM
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
## 2020-08-02 NOTE — PROGRESS NOTES
[facility]  Progress Note    Zoraida Edmondson  MRN: [id number]  Date: 8/2/2020    Reason for F/U :   Diabetic foot infection    Impression :   1. Peripheral arterial disease status post left superficial femoral, popliteal, tibial, peroneal trunk and posterior tibial artery atherectomy/balloon angioplasty 7/29/2020  2. Left foot plantar ulcers, great toe gangrene with associated cellulitis of the foot  3. Diabetes mellitus with associated neuropathy  4. Esophageal cancer    Recommendations:   · The patient continues on intravenous antimicrobial therapy with Unasyn and vancomycin  · The patient is scheduled for left TMA 8/3/2020  · He does not have any other new issues or concerns at this time. · We will continue to monitor his progress    Infection Control Recommendations:   Universal precautions    Discharge Planning:   Estimated Length of IV antimicrobials: To be determined  Patient will need Midline Catheter Insertion/ PICC line Insertion: No  Patient will need: Home IV , [place],  SNF,  LTAC: Undetermined  Patient willneed outpatient wound care: No    MedicalDecision making / Summary of Stay:   Zoraida Edmondson is a 60y.o.-year-old male present to the hospital with left great toe black discoloration associated with bloody drainage and pain worsening over several days. He had left great toe wound for several weeks. Initial labs showed blood glucose 331, CRP 62.4, sed rate 91, WBC 12.5. History of peripheral vascular disease status post right below-knee amputation.     X-ray left foot indicates soft tissue swelling of the first ray with some associated punctate radiopaque foreign bodies.  Reabsorption and/or amputation of the fifth ray, some associated soft tissue changes.   He was seen at the ER 7/24/2020 with alcohol intoxication, previously was seen on 7/12/2020 with hyperglycemia, hemoglobin A1c was 13, had reportedly a left great toe ulcer with mild erythema was treated with Differential:   Recent Labs     08/01/20  0536 08/02/20  0632   WBC 11.4* 12.2*   HGB 10.2* 10.6*   HCT 33.5* 35.6*    487*   LYMPHOPCT 18* 17*   MONOPCT 10 8     BMP:   Recent Labs     08/01/20  0536 08/02/20  0632   * 140   K 4.0 4.4    101   CO2 30 30   BUN 14 19   CREATININE 0.69* 0.99     Hepatic Function Panel: No results for input(s): PROT, LABALBU, BILIDIR, IBILI, BILITOT, ALKPHOS, ALT, AST in the last 72 hours. No results for input(s): VANCOTROUGH in the last 72 hours. Lab Results   Component Value Date    CRP 64.5 (H) 07/31/2020     Lab Results   Component Value Date    SEDRATE 91 (H) 07/27/2020       No results for input(s): PROCAL in the last 72 hours. Imaging Studies:   No new imaging    Cultures:     MRSA DNA Probe, Nasal [[id number]]   Collected: 07/29/20 1933    Order Status: Completed  Specimen: Nasal  Updated: 07/31/20 1422     Specimen Description  . NASAL SWAB     MRSA, DNA, Nasal  NEGATIVE:  MRSA DNA not detected by nucleic acid amplification. Comment:                                                     Results should be used as an adjunct to nosocomial control efforts to identify patients   needing enhanced precautions.     The test is not intended to identify patients with staphylococcal infections.  Results   should not be used to guide or monitor treatment for MRSA infections. Culture, Blood 1 [[id number]]   Collected: 07/28/20 0902    Order Status: Completed  Specimen: Blood  Updated: 07/31/20 0006     Specimen Description  . BLOOD     Special Requests  NOT REPORTED     Culture  NO GROWTH 3 DAYS    MRSA DNA Probe, Nasal [[id number]]   Collected: 07/28/20 1907    Order Status: Completed  Specimen: Nasal  Updated: 07/29/20 1100     Specimen Description  . NASAL SWAB     MRSA, DNA, Nasal  NEGATIVE:  MRSA DNA not detected by nucleic acid amplification.      Comment:                                                     Results should be used as an adjunct to nosocomial control efforts to identify patients   needing enhanced precautions.     The test is not intended to identify patients with staphylococcal infections.  Results   should not be used to guide or monitor treatment for MRSA infections. Medications:      insulin glargine  35 Units Subcutaneous Nightly    famotidine (PEPCID) injection  20 mg Intravenous BID    guaiFENesin  600 mg Oral BID    ampicillin-sulbactam  3 g Intravenous Q6H    clopidogrel  75 mg Oral Daily    pantoprazole  40 mg Oral QAM AC    sodium chloride flush  10 mL Intravenous 2 times per day    atorvastatin  40 mg Oral Nightly    metFORMIN  500 mg Oral BID WC    aspirin  81 mg Oral Daily    apixaban  5 mg Oral BID    insulin lispro  0-18 Units Subcutaneous TID WC    insulin lispro  0-9 Units Subcutaneous Nightly    vancomycin  1,250 mg Intravenous Q12H    vancomycin (VANCOCIN) intermittent dosing (placeholder)   Other RX Placeholder    fluticasone  2 spray Each Nostril Daily           [facility]  Angie Pina MD  Perfect [employer] messaging  OFFICE: [phone]      Electronically signed by Angie Pina MD on 8/2/2020 at 9:58 AM  Thank you for allowing us to participate in the care of this patient. Please call with questions. This note iscreated with the assistance of a speech recognition program.  While intending to generate a document that actually reflects the content of the visit, the document can still have some errors including those of syntax andsound a like substitutions which may escape proof reading. In such instances, actual meaning can be extrapolated by contextual diversion.

## 2020-08-02 NOTE — PLAN OF CARE
Problem: Falls - Risk of:  Goal: Will remain free from falls  Description: Will remain free from falls  8/2/2020 0123 by Breezy Herrera RN  Outcome: Ongoing     Problem: Falls - Risk of:  Goal: Absence of physical injury  Description: Absence of physical injury  8/2/2020 0123 by Breezy Herrera RN  Outcome: Ongoing     Problem: Pain:  Goal: Pain level will decrease  Description: Pain level will decrease  Outcome: Ongoing     Problem: Pain:  Goal: Control of acute pain  Description: Control of acute pain  Outcome: Ongoing     Problem: Pain:  Goal: Control of chronic pain  Description: Control of chronic pain  Outcome: Ongoing     Problem: Serum Glucose Level - Abnormal:  Goal: Ability to maintain appropriate glucose levels will improve  Description: Ability to maintain appropriate glucose levels will improve  Outcome: Ongoing     Problem: Skin Integrity:  Goal: Will show no infection signs and symptoms  Description: Will show no infection signs and symptoms  Outcome: Ongoing     Problem: Skin Integrity:  Goal: Absence of new skin breakdown  Description: Absence of new skin breakdown  Outcome: Ongoing     Problem: Nutrition  Goal: Optimal nutrition therapy  Outcome: Ongoing

## 2020-08-02 NOTE — PROGRESS NOTES
Pharmacy Note  Vancomycin Consult - Brief Note     Vancomycin Day: 7  Current Dose:  Vancomycin 1250mg IV q 12 hours  Patient's labs, cultures, vitals, and vancomycin regimen reviewed. No changes today. Plans for transmetatarsal amputation of left foot for tomorrow - Monday noted. Current diagnosis for which MRSA is suspected/confirmed: diabetic foot inection  ONLY for suspected pneumonia or COPD: MRSA nasal swab  N/A: Non respiratory infection. .        TEMP: 99.1  Calculated CrCl based on IBW:  86  mL/min    Recent Labs     08/01/20  0536 08/02/20  0632   WBC 11.4* 12.2*     Recent Labs     08/01/20  0536 08/02/20  0632   CREATININE 0.69* 0.99     Estimated Creatinine Clearance: 82 mL/min (based on SCr of 0.99 mg/dL). Intake/Output Summary (Last 24 hours) at 8/2/2020 9332  Last data filed at 8/2/2020 0815  Gross per 24 hour   Intake --   Output 850 ml   Net -850 ml         Thank you for the consult. Pharmacy will continue to follow.   Minnie Linares RPh/PharmD  8/2/2020 9:07 AM

## 2020-08-03 ENCOUNTER — HOSPITAL ENCOUNTER (EMERGENCY)
Age: 65
Discharge: HOME | DRG: 240 | End: 2020-08-03
Attending: EMERGENCY MEDICINE
Payer: MEDICARE

## 2020-08-03 VITALS
HEIGHT: 73 IN | BODY MASS INDEX: 21.46 KG/M2 | HEART RATE: 93 BPM | WEIGHT: 161.9 LBS | DIASTOLIC BLOOD PRESSURE: 60 MMHG | SYSTOLIC BLOOD PRESSURE: 117 MMHG | RESPIRATION RATE: 17 BRPM | TEMPERATURE: 98.1 F | OXYGEN SATURATION: 97 %

## 2020-08-03 VITALS
OXYGEN SATURATION: 97 % | HEART RATE: 100 BPM | RESPIRATION RATE: 16 BRPM | HEIGHT: 72 IN | BODY MASS INDEX: 23.03 KG/M2 | DIASTOLIC BLOOD PRESSURE: 59 MMHG | SYSTOLIC BLOOD PRESSURE: 127 MMHG | WEIGHT: 170 LBS | TEMPERATURE: 98.4 F

## 2020-08-03 LAB
ABSOLUTE EOS #: 0.5 K/UL (ref 0–0.44)
ABSOLUTE IMMATURE GRANULOCYTE: 0.08 K/UL (ref 0–0.3)
ABSOLUTE LYMPH #: 2.14 K/UL (ref 1.1–3.7)
ABSOLUTE MONO #: 0.88 K/UL (ref 0.1–1.2)
ANION GAP SERPL CALCULATED.3IONS-SCNC: 11 MMOL/L (ref 9–17)
BASOPHILS # BLD: 1 % (ref 0–2)
BASOPHILS ABSOLUTE: 0.08 K/UL (ref 0–0.2)
BUN BLDV-MCNC: 18 MG/DL (ref 8–23)
BUN/CREAT BLD: 22 (ref 9–20)
CALCIUM IONIZED: 1.28 MMOL/L (ref 1.13–1.33)
CALCIUM SERPL-MCNC: 8.5 MG/DL (ref 8.6–10.4)
CHLORIDE BLD-SCNC: 103 MMOL/L (ref 98–107)
CO2: 26 MMOL/L (ref 20–31)
CREAT SERPL-MCNC: 0.83 MG/DL (ref 0.7–1.2)
CULTURE: NORMAL
DIFFERENTIAL TYPE: ABNORMAL
EOSINOPHILS RELATIVE PERCENT: 5 % (ref 1–4)
GFR AFRICAN AMERICAN: >60 ML/MIN
GFR NON-AFRICAN AMERICAN: >60 ML/MIN
GFR SERPL CREATININE-BSD FRML MDRD: ABNORMAL ML/MIN/{1.73_M2}
GFR SERPL CREATININE-BSD FRML MDRD: ABNORMAL ML/MIN/{1.73_M2}
GLUCOSE BLD-MCNC: 100 MG/DL (ref 75–110)
GLUCOSE BLD-MCNC: 115 MG/DL (ref 75–110)
GLUCOSE BLD-MCNC: 154 MG/DL (ref 70–99)
HCT VFR BLD CALC: 35 % (ref 40.7–50.3)
HEMOGLOBIN: 10.6 G/DL (ref 13–17)
IMMATURE GRANULOCYTES: 1 %
LYMPHOCYTES # BLD: 21 % (ref 24–43)
Lab: NORMAL
MCH RBC QN AUTO: 29.3 PG (ref 25.2–33.5)
MCHC RBC AUTO-ENTMCNC: 30.3 G/DL (ref 28.4–34.8)
MCV RBC AUTO: 96.7 FL (ref 82.6–102.9)
MONOCYTES # BLD: 8 % (ref 3–12)
NRBC AUTOMATED: 0 PER 100 WBC
PDW BLD-RTO: 12.3 % (ref 11.8–14.4)
PLATELET # BLD: 450 K/UL (ref 138–453)
PLATELET ESTIMATE: ABNORMAL
PMV BLD AUTO: 8.8 FL (ref 8.1–13.5)
POTASSIUM SERPL-SCNC: 4 MMOL/L (ref 3.7–5.3)
RBC # BLD: 3.62 M/UL (ref 4.21–5.77)
RBC # BLD: ABNORMAL 10*6/UL
SEG NEUTROPHILS: 64 % (ref 36–65)
SEGMENTED NEUTROPHILS ABSOLUTE COUNT: 6.74 K/UL (ref 1.5–8.1)
SODIUM BLD-SCNC: 140 MMOL/L (ref 135–144)
SPECIMEN DESCRIPTION: NORMAL
WBC # BLD: 10.4 K/UL (ref 3.5–11.3)
WBC # BLD: ABNORMAL 10*3/UL

## 2020-08-03 PROCEDURE — 82947 ASSAY GLUCOSE BLOOD QUANT: CPT

## 2020-08-03 PROCEDURE — 2580000003 HC RX 258: Performed by: SURGERY

## 2020-08-03 PROCEDURE — 2580000003 HC RX 258: Performed by: NURSE PRACTITIONER

## 2020-08-03 PROCEDURE — 85025 COMPLETE CBC W/AUTO DIFF WBC: CPT

## 2020-08-03 PROCEDURE — 36415 COLL VENOUS BLD VENIPUNCTURE: CPT

## 2020-08-03 PROCEDURE — 99283 EMERGENCY DEPT VISIT LOW MDM: CPT

## 2020-08-03 PROCEDURE — 82330 ASSAY OF CALCIUM: CPT

## 2020-08-03 PROCEDURE — 6360000002 HC RX W HCPCS: Performed by: SURGERY

## 2020-08-03 PROCEDURE — 99238 HOSP IP/OBS DSCHRG MGMT 30/<: CPT | Performed by: INTERNAL MEDICINE

## 2020-08-03 PROCEDURE — 80048 BASIC METABOLIC PNL TOTAL CA: CPT

## 2020-08-03 RX ORDER — HYDROCODONE BITARTRATE AND ACETAMINOPHEN 5; 325 MG/1; MG/1
1 TABLET ORAL EVERY 6 HOURS PRN
Qty: 4 TABLET | Refills: 0 | Status: ON HOLD | OUTPATIENT
Start: 2020-08-03 | End: 2020-08-06 | Stop reason: HOSPADM

## 2020-08-03 RX ORDER — SODIUM CHLORIDE 9 MG/ML
INJECTION, SOLUTION INTRAVENOUS CONTINUOUS
Status: DISCONTINUED | OUTPATIENT
Start: 2020-08-03 | End: 2020-08-03 | Stop reason: HOSPADM

## 2020-08-03 RX ADMIN — MORPHINE SULFATE 4 MG: 4 INJECTION, SOLUTION INTRAMUSCULAR; INTRAVENOUS at 01:01

## 2020-08-03 RX ADMIN — SODIUM CHLORIDE: 9 INJECTION, SOLUTION INTRAVENOUS at 05:37

## 2020-08-03 RX ADMIN — SODIUM CHLORIDE 3 G: 900 INJECTION INTRAVENOUS at 00:57

## 2020-08-03 RX ADMIN — MORPHINE SULFATE 4 MG: 4 INJECTION, SOLUTION INTRAMUSCULAR; INTRAVENOUS at 04:31

## 2020-08-03 RX ADMIN — SODIUM CHLORIDE 3 G: 900 INJECTION INTRAVENOUS at 05:37

## 2020-08-03 ASSESSMENT — PAIN DESCRIPTION - PROGRESSION: CLINICAL_PROGRESSION: GRADUALLY WORSENING

## 2020-08-03 ASSESSMENT — PAIN DESCRIPTION - LOCATION: LOCATION: BACK;NECK

## 2020-08-03 ASSESSMENT — ENCOUNTER SYMPTOMS
VOMITING: 0
COUGH: 0
SHORTNESS OF BREATH: 0
COLOR CHANGE: 1
SINUS PRESSURE: 0
NAUSEA: 0

## 2020-08-03 ASSESSMENT — PAIN SCALES - GENERAL
PAINLEVEL_OUTOF10: 9
PAINLEVEL_OUTOF10: 8
PAINLEVEL_OUTOF10: 8

## 2020-08-03 ASSESSMENT — PAIN DESCRIPTION - ORIENTATION: ORIENTATION: POSTERIOR

## 2020-08-03 ASSESSMENT — PAIN DESCRIPTION - ONSET: ONSET: ON-GOING

## 2020-08-03 ASSESSMENT — PAIN DESCRIPTION - PAIN TYPE: TYPE: ACUTE PAIN

## 2020-08-03 ASSESSMENT — PAIN DESCRIPTION - FREQUENCY: FREQUENCY: CONTINUOUS

## 2020-08-03 ASSESSMENT — PAIN DESCRIPTION - DESCRIPTORS: DESCRIPTORS: ACHING

## 2020-08-03 ASSESSMENT — PAIN - FUNCTIONAL ASSESSMENT: PAIN_FUNCTIONAL_ASSESSMENT: PREVENTS OR INTERFERES SOME ACTIVE ACTIVITIES AND ADLS

## 2020-08-03 NOTE — DISCHARGE SUMMARY
includes diabetes, hypertension, dyslipidemia and COPD.  He is a current every day cigarette smoker. He is prescribed eliquis.      Of note, the patient has several ER visits over the past month with complaint of left leg pain and phantom right leg pain.      Initial chemistries include: Blood glucose 331, CRP 62.4, sed rate 91, WBC 12.5, HBG 11.3, HCT 37.6.     X-ray left foot indicates soft tissue swelling of the first ray with some associated punctate radiopaque foreign bodies.  Reabsorption and/or amputation of the fifth ray, some associated soft tissue changes. \"     Subsequent database included:  Sodium 134Low      BUN 26High      The patient has been admitted  Vascular surgery consulted  Podiatry consulted  Blood sugars will be monitored and controlled  Electrolyte abnormalities will be addressed  Renal function will be observed  Antibiotics per Infectious Disease service      On 7/29 the patient underwent:  PROCEDURES PERFORMED:  1.  Aortogram with selective left lower extremity runoff (third-order  selective). 2.  Left superficial femoral, popliteal, tibioperoneal trunk and  posterior tibial artery atherectomy with [place] 2.4 and 1.85 mm  catheters. 3.  Left popliteal, tibioperoneal trunk, and posterior tibial artery  angioplasty with [place] 3 mm x 220 mm balloon. 4.  Left superficial femoral, popliteal, and tibioperoneal trunk  drug-coated balloon angioplasty with 4 mm x 250 mm Admiral balloon. 5.  Left superficial femoral and popliteal artery DCB angioplasty with  Admiral 5 mm x 250 mm balloons x2.  6.  Left common and external iliac artery angioplasty with [place] 7 mm  x 40 mm balloon. 7.  Ultrasound-guided access, right common femoral artery. 8.  Placement of right femoral Mynx closure device.   9.  Conscious sedation.     Dopplers  Summary     Left HILARY of 1.17 is within normal limits.         Podiatry and vascular has recommended amputation  The patient now consents to surgery  OR planned for 8/3    Discharge plan:     The patient left AMA    It appears that the patient returned to the hospital again later in the day  Surgery will be arranged on an outpatient basis    The patient was instructed to follow up with their PCP, Melissa Nguyen DO in one week   Podiatry follow-up as scheduled  Vascular surgery follow-up as scheduled    No discharge procedures on file. Significant Diagnostic Studies:     Labs / Micro:  Labs:  Hematology:  Recent Labs     08/01/20  0536 08/02/20  0632 08/03/20  0505   WBC 11.4* 12.2* 10.4   RBC 3.45* 3.63* 3.62*   HGB 10.2* 10.6* 10.6*   HCT 33.5* 35.6* 35.0*   MCV 97.1 98.1 96.7   MCH 29.6 29.2 29.3   MCHC 30.4 29.8 30.3   RDW 12.2 12.4 12.3    487* 450   MPV 8.6 8.9 8.8   CRP  --  72.8*  --      Chemistry:  Recent Labs     08/01/20  0536 08/02/20  0632 08/03/20  0505   * 140 140   K 4.0 4.4 4.0    101 103   CO2 30 30 26   GLUCOSE 109* 99 154*   BUN 14 19 18   CREATININE 0.69* 0.99 0.83   ANIONGAP 11 9 11   LABGLOM >60 >60 >60   GFRAA >60 >60 >60   CALCIUM 8.6 8.5* 8.5*   CAION  --   --  1.28     Recent Labs     08/02/20  0200 08/02/20  0605 08/02/20  1124 08/02/20  1659 08/02/20  2115 08/03/20  0609   POCGLU 137* 100 162* 211* 132* 115*         Radiology:    Xr Radius Ulna Left (2 Views)    Result Date: 7/24/2020  EXAMINATION: TWO XRAY VIEWS OF THE LEFT FOREARM 7/24/2020 7:13 pm COMPARISON: None. HISTORY: ORDERING SYSTEM PROVIDED HISTORY: fall, intoxicated TECHNOLOGIST PROVIDED HISTORY: fall, intoxicated Reason for Exam: abrasion to posterior forearm Acuity: Acute Type of Exam: Initial FINDINGS: No acute fracture or dislocation. No acute abnormality. Xr Foot Left (min 3 Views)    Result Date: 7/27/2020  EXAMINATION: THREE XRAY VIEWS OF THE LEFT FOOT 7/27/2020 4:12 pm COMPARISON: None.  HISTORY: ORDERING SYSTEM PROVIDED HISTORY: Diabetic foot wound, left leg TECHNOLOGIST PROVIDED HISTORY: Diabetic foot wound, left leg Reason for Exam: diabetic left foot, 1st digit bleeding Acuity: Acute Type of Exam: Initial FINDINGS: Soft tissue swelling of the 1st ray. Amputation and/or resorption of the 1st ray to the level of the distal 5th metatarsal diaphysis. A few foci of soft tissue swelling suspected. A few punctate radiodensities in the 1st ray are evident. Soft tissue swelling of the 1st ray with some associated punctate radiopaque foreign bodies. Resorption and/or amputation of the 5th ray some associated soft tissue changes. Xr Abdomen (kub) (single Ap View)    Result Date: 8/1/2020  EXAMINATION: ONE SUPINE XRAY VIEW(S) OF THE ABDOMEN 8/1/2020 1:49 pm COMPARISON: July 10, 2018 HISTORY: ORDERING SYSTEM PROVIDED HISTORY: constipation TECHNOLOGIST PROVIDED HISTORY: constipation Reason for Exam: constipation Acuity: Unknown Type of Exam: Unknown FINDINGS: Nonspecific bowel gas pattern without evidence of obstruction. No abnormal calcifications. No acute osseous abnormality. No evidence of bowel obstruction. Ct Head Wo Contrast    Result Date: 7/24/2020  EXAMINATION: CT OF THE HEAD WITHOUT CONTRAST  7/24/2020 9:13 pm TECHNIQUE: CT of the head was performed without the administration of intravenous contrast. Dose modulation, iterative reconstruction, and/or weight based adjustment of the mA/kV was utilized to reduce the radiation dose to as low as reasonably achievable. COMPARISON: 08/06/2019 HISTORY: ORDERING SYSTEM PROVIDED HISTORY: fall TECHNOLOGIST PROVIDED HISTORY: fall Reason for Exam: fall FINDINGS: BRAIN/VENTRICLES: There is patchy periventricular and subcortical white matter low attenuation that is nonspecific and unchanged. There is a small area of encephalomalacia in the right frontal lobe consistent with a remote infarct, unchanged. There is no evidence of acute hemorrhage, mass or extra-axial fluid collection. The gray-white differentiation is maintained without evidence of an acute infarct. There is no evidence of hydrocephalus. ORDERING SYSTEM PROVIDED HISTORY: cough TECHNOLOGIST PROVIDED HISTORY: cough Reason for Exam: Cough Acuity: Acute Type of Exam: Initial FINDINGS: Cardiac silhouette is normal in size. Scattered linear opacities within the left lower lobe favoring atelectasis although subtle infectious process is possible in the proper clinical setting. Right hemithorax is clear. Trachea is midline. Osseous structures and soft tissues are grossly intact. Status post left humeral ORIF and chronic appearing fracture deformity of the right humerus. Linear opacities in the left lung base favors atelectasis/scarring although may represent infectious process in the proper clinical setting. Otherwise, no convincing evidence for acute cardiopulmonary pathology. Mri Foot Left W Wo Contrast    Result Date: 7/31/2020  EXAMINATION: MRI OF THE LEFT FOOT WITH AND WITHOUT CONTRAST, 7/31/2020 8:36 am TECHNIQUE: Multiplanar multisequence MRI of the left foot was performed with and without the administration of intravenous contrast. COMPARISON: None HISTORY: ORDERING SYSTEM PROVIDED HISTORY: OM surgical planning TECHNOLOGIST PROVIDED HISTORY: OM surgical planning What is the area of interest?->Forefoot Reason for Exam: non healing open wound left foot, DM uncontrolled Acuity: Chronic Type of Exam: Subsequent/Follow-up Additional signs and symptoms: no injury to foot Relevant Medical/Surgical History:  increased infection. OM FINDINGS: Exam limited by motion. SOFT TISSUES: Ulceration of the 4th distal phalanx with intense edema. Lesser edema of the 3rd ray. Dorsal soft tissue swelling with enhancement. Edema within the intrinsic foot muscles. No evidence of tenosynovitis or retracted tendon tear. BONE MARROW: Intense bone marrow edema within the 4th distal and middle phalanges. Loss of T1 hyperintensity. Findings compatible with osteomyelitis. Intense enhancement after contrast administration.   Mild bone marrow edema throughout the 1st proximal phalanx is felt to be degenerative or reactive in nature. No loss of T1 hyperintensity. Sequela of an old fracture is also considered. Postsurgical changes of the 5th ray. JOINTS: 1st MTP joint and 1st IP joint degenerative change. Normal alignment of the 2nd through 4th MTP joints. Osteomyelitis of the 4th distal and middle phalanges with an associated skin ulceration. Cellulitis of the 4th ray, extending along the dorsal foot. Vl Arterial Pvr Lower    Result Date: 7/31/2020    [facility]  Vascular Lower Arterial Plethysmography Procedure   Patient Name   Nicole Lomeli    Date of Study           07/31/2020                 Christiano Tipton   Date of Birth  1955  Gender                  Male   Age            60 year(s)  Race                       Room Number    2010   Corporate ID # [id number]   Patient Acct # [de-identified]   MR #           [id number]     Sonographer             Gali Sussy   Accession #    [id number]  Interpreting Physician  [address] [place] [address]   Referring                  Referring Physician     Ismael Larry  Nurse  Practitioner  Procedure Type of Study:   Extremities Arteries: Lower Arterial Plethysmography, PVR Lower. Patient Status: In Patient. Technical Quality:Adequate visualization. Comments:INDICATIONS: S/P left leg arthrectomy Right leg BKA  Conclusions   Summary   Left HILARY of 1.17 is within normal limits.    Signature   ----------------------------------------------------------------  Electronically signed by Ana Hancock on  07/31/2020 04:31 PM  ----------------------------------------------------------------   ----------------------------------------------------------------  Electronically signed by Manley Chapman Reyes,Arthur(Interpreting  physician) on 07/31/2020 09:46 PM  ----------------------------------------------------------------  Velocities are measured in cm/s ; Diameters are measured in cm Pressures glargine 100 UNIT/ML injection vial  Commonly known as:  Lantus     nicotine 21 MG/24HR  Commonly known as:  NICODERM CQ     predniSONE 10 MG tablet  Commonly known as:  Nuris Cherry your doctor about these medications    apixaban 5 MG Tabs tablet  Commonly known as:  Eliquis  Take 1 tablet by mouth 2 times daily     aspirin 81 MG chewable tablet     atorvastatin 40 MG tablet  Commonly known as:  LIPITOR  Take 1 tablet by mouth daily     blood glucose test strips strip  Commonly known as:  ASCENSIA AUTODISC VI;ONE TOUCH ULTRA TEST VI  Use with associated glucose meter to check fluctuating blood sugars BID     glucose monitoring kit monitoring kit  1 kit by Does not apply route daily     * insulin lispro 100 UNIT/ML injection vial  Commonly known as:  HUMALOG  Inject 0-18 Units into the skin 3 times daily (with meals)     * insulin lispro 100 UNIT/ML injection vial  Commonly known as:  HUMALOG  Inject 0-9 Units into the skin nightly     Insulin Pen Needle 32G X 4 MM Misc  1 each by Does not apply route daily     ipratropium-albuterol 0.5-2.5 (3) MG/3ML Soln nebulizer solution  Commonly known as:  DUONEB  Inhale 3 mLs into the lungs every 4 hours (while awake)     metFORMIN 500 MG tablet  Commonly known as:  GLUCOPHAGE  Take 1 tablet by mouth 2 times daily (with meals)     pantoprazole 40 MG tablet  Commonly known as:  PROTONIX  Ask about: Which instructions should I use? [place] FlexTouch 100 UNIT/ML Sopn  Generic drug:  Insulin Degludec     * TRUEplus Lancets 30G Misc  Inject 1 each into the skin 3 times daily TO CHECK FLUCTUATING BLOOD SUGARS IE HYPERGLYCEMIA     * Lancets Misc  1 each by Does not apply route 2 times daily         * This list has 4 medication(s) that are the same as other medications prescribed for you. Read the directions carefully, and ask your doctor or other care provider to review them with you.                 Time Spent on discharge is  17 mins in patient examination, evaluation,

## 2020-08-03 NOTE — PLAN OF CARE
Problem: Falls - Risk of:  Goal: Will remain free from falls  Description: Will remain free from falls  8/3/2020 0631 by Cornell Marcus RN  Outcome: Ongoing  8/3/2020 0129 by Daniel Aranda RN  Outcome: Ongoing  Goal: Absence of physical injury  Description: Absence of physical injury  8/3/2020 0631 by Cornell Marcus RN  Outcome: Ongoing  8/3/2020 0129 by Daniel Aranda RN  Outcome: Ongoing     Problem: Pain:  Goal: Pain level will decrease  Description: Pain level will decrease  8/3/2020 0631 by Cornell Marcus RN  Outcome: Ongoing  8/3/2020 0129 by Daniel Aranda RN  Outcome: Ongoing  Goal: Control of acute pain  Description: Control of acute pain  8/3/2020 0631 by Cornell Marcus RN  Outcome: Ongoing  8/3/2020 0129 by Daniel Aranda RN  Outcome: Ongoing  Goal: Control of chronic pain  Description: Control of chronic pain  8/3/2020 0631 by Cornell Marcus RN  Outcome: Ongoing  8/3/2020 0129 by Daniel Aranda RN  Outcome: Ongoing     Problem: Serum Glucose Level - Abnormal:  Goal: Ability to maintain appropriate glucose levels will improve  Description: Ability to maintain appropriate glucose levels will improve  8/3/2020 0631 by Cornell Marcus RN  Outcome: Ongoing  8/3/2020 0129 by Daniel Aranda RN  Outcome: Ongoing     Problem: Skin Integrity:  Goal: Will show no infection signs and symptoms  Description: Will show no infection signs and symptoms  8/3/2020 0631 by Cornell Marcus RN  Outcome: Ongoing  8/3/2020 0129 by Daniel Aranda RN  Outcome: Ongoing  Goal: Absence of new skin breakdown  Description: Absence of new skin breakdown  8/3/2020 0631 by Cornell Marcus RN  Outcome: Ongoing  8/3/2020 0129 by Daniel Aranda RN  Outcome: Ongoing     Problem: Nutrition  Goal: Optimal nutrition therapy  8/3/2020 0631 by Cornell Marcus RN  Outcome: Ongoing  8/3/2020 0129 by Daniel Aranda RN  Outcome: Ongoing

## 2020-08-03 NOTE — PROGRESS NOTES
Progress Note  Podiatric Medicine and Surgery     Subjective     CC: Left toe wound    Patient left AMA this morning before he was seen      HPI :  Robert Quinonez is a 60 y.o. male seen at [facility] emergency department. Patient states that he was walking around in his shoe, when he noticed that it was bleeding through. Patient reports that he usually only wear socks, because he knows that this can be a problem. Patient has a history of a right BKA, left fifth ray resection. Patient also has a history of uncontrolled diabetes. Patient reported that he has had his blood sugars ranging between 200 and 600. Patient also has a well-known history of poor compliance, and leaving the emergency department AMA. Patient does not have a podiatrist he follows with.  Patient denies any other pedal complaints

## 2020-08-03 NOTE — PROGRESS NOTES
VASCULAR SURGERY  PROGRESS NOTE      8/2/2020 9:48 PM  Subjective:   Admit Date: 7/27/2020  PCP: Jason Lee DO    Chief Complaint   Patient presents with    Leg Pain    Foot Pain     left     Interval History: No complaints. Plans for TMA noted. PVR study normal on left. Diet: DIET GENERAL; Carb Control: 5 carb choices (75 gms)/meal  Diet NPO, After Midnight    Medications:   Scheduled Meds:   insulin glargine  35 Units Subcutaneous Nightly    famotidine (PEPCID) injection  20 mg Intravenous BID    guaiFENesin  600 mg Oral BID    ampicillin-sulbactam  3 g Intravenous Q6H    clopidogrel  75 mg Oral Daily    pantoprazole  40 mg Oral QAM AC    sodium chloride flush  10 mL Intravenous 2 times per day    atorvastatin  40 mg Oral Nightly    metFORMIN  500 mg Oral BID WC    aspirin  81 mg Oral Daily    [Held by provider] apixaban  5 mg Oral BID    insulin lispro  0-18 Units Subcutaneous TID WC    insulin lispro  0-9 Units Subcutaneous Nightly    vancomycin  1,250 mg Intravenous Q12H    vancomycin (VANCOCIN) intermittent dosing (placeholder)   Other RX Placeholder    fluticasone  2 spray Each Nostril Daily     Continuous Infusions:   dextrose           Labs:   CBC:   Recent Labs     07/31/20  0521 08/01/20  0536 08/02/20  0632   WBC 12.9* 11.4* 12.2*   HGB 10.7* 10.2* 10.6*   * 392 487*     BMP:    Recent Labs     07/31/20  0521 08/01/20  0536 08/02/20  0632    145* 140   K 4.3 4.0 4.4    104 101   CO2 29 30 30   BUN 16 14 19   CREATININE 0.76 0.69* 0.99   GLUCOSE 64* 109* 99     Hepatic: No results for input(s): AST, ALT, ALB, BILITOT, ALKPHOS in the last 72 hours. Troponin: Invalid input(s): TROPONIN  BNP: No results for input(s): BNP in the last 72 hours. Lipids: No results for input(s): CHOL, HDL in the last 72 hours. Invalid input(s): LDLCALCU  INR:   No results for input(s): INR in the last 72 hours.     Objective:   Vitals: /60   Pulse 93   Temp 98.1 °F (36.7 °C) (Oral)   Resp 17   Ht 6' 1\" (1.854 m)   Wt 165 lb (74.8 kg)   SpO2 97%   BMI 21.77 kg/m²   General appearance: alert, cooperative and no distress  Mental Status: oriented to person, place and time with normal affect  Neck: good carotid pulses, no JVD  Lungs: clear to auscultation bilaterally, normal effort  Heart: regular rate and rhythm, no murmur,  Abdomen: soft, non-tender, non-distended, bowel sounds present all four quadrants, no masses, hepatomegaly, splenomegaly or aortic enlargement  Extremities: Left hallux gangrene, palpable left posterior tibial pulse, right below-knee amputation  Skin: no gross lesions, rashes, or induration    Assessment:   3 62-year-old diabetic male doing well status post left lower extremity atherectomy and DCB angioplasty    Patient Active Problem List:     Type 2 diabetes mellitus with diabetic polyneuropathy, with long-term current use of insulin (HCC)     Neuropathic pain, leg     Diabetic polyneuropathy ([facility])     Allergic rhinitis     Tobacco dependence     COPD (chronic obstructive pulmonary disease) ([facility])     Edentulous     Dysphagia     Lung nodules     Esophageal cancer (HCC)     Fracture of humerus, left, closed     Closed fracture of humerus     DM type 2 with diabetic peripheral neuropathy (HCC)     Chronic obstructive pulmonary disease (HCC)     Dyslipidemia     Gastroesophageal reflux disease     Chronic pain syndrome     Marijuana use     History of colon polyps     Cellulitis of right heel     PVD (peripheral vascular disease) ([facility])     Functional diarrhea     Sepsis due to methicillin resistant Staphylococcus aureus (MRSA) ([facility])     History of esophageal cancer     Osteomyelitis, chronic, ankle or foot ([address]     Peripheral artery disease ([address]     Other pulmonary embolism without acute cor pulmonale (HCC)     Carotid stenosis, asymptomatic, bilateral     Lower limb amputation status     Fx humeral neck, right, closed, initial encounter     Transient hypotension     Constipation due to opioid therapy     Acute kidney injury ([address]     Diabetic ulcer of left midfoot associated with type 2 diabetes mellitus, with fat layer exposed ([address]     Complication of below knee amputation stump (HCC)     COPD exacerbation (HCC)     Hyponatremia     Pain, phantom limb (HCC)     Below-knee amputation of right lower extremity (HCC)     Hyperglycemia     Moderate protein malnutrition ([address]     Essential hypertension     Uncontrolled type 2 diabetes mellitus with hyperglycemia ([address]     History of pulmonary embolism - 2017     Atelectasis     Uncontrolled type 2 diabetes mellitus with foot ulcer (HCC)     Azotemia     Anemia, normocytic normochromic      Plan:   1. TMA tomorrow  2.  Continue antibiotics    Electronically signed by Demetrius Goldstein MD on 8/2/2020 at 9:48 PM

## 2020-08-03 NOTE — ED PROVIDER NOTES
(HUMALOG) 100 UNIT/ML injection vial Inject 0-18 Units into the skin 3 times daily (with meals), Disp-1 vial, R-3Normal      !! insulin lispro (HUMALOG) 100 UNIT/ML injection vial Inject 0-9 Units into the skin nightly, Disp-1 vial, R-3Normal      atorvastatin (LIPITOR) 40 MG tablet Take 1 tablet by mouth daily, Disp-90 tablet, R-3**Patient requests 90 days supply**Normal      Insulin Pen Needle 32G X 4 MM MISC DAILY Starting Mon 1/14/2019, Disp-120 each, R-3, Normal       !! - Potential duplicate medications found. Please discuss with provider. ALLERGIES     is allergic to gabapentin. FAMILY HISTORY     He indicated that his mother is alive. He indicated that his father is alive. He indicated that the status of his sister is unknown. He indicated that the status of his maternal aunt is unknown. He indicated that the status of his maternal uncle is unknown. He indicated that the status of his paternal aunt is unknown. SOCIAL HISTORY       Social History     Tobacco Use    Smoking status: Current Every Day Smoker     Packs/day: 1.00     Years: 30.00     Pack years: 30.00     Types: Cigarettes    Smokeless tobacco: Former User     Types: Chew     Quit date: 1980    Tobacco comment: pt states has cut down a lot. Had 1 cigarette yesterday   Substance Use Topics    Alcohol use: Yes     Alcohol/week: 0.0 standard drinks     Frequency: Never     Comment: 1 beer yesterday, states occ    Drug use: Not Currently     Types: Marijuana     Comment: last marijuana 1 week ago     PHYSICAL EXAM     INITIAL VITALS: BP (!) 127/59   Pulse 100   Temp 98.4 °F (36.9 °C)   Resp 16   Ht 6' (1.829 m)   Wt 170 lb (77.1 kg)   SpO2 97%   BMI 23.06 kg/m²    Physical Exam  Constitutional:       Appearance: Normal appearance. HENT:      Right Ear: External ear normal.      Left Ear: External ear normal.      Mouth/Throat:      Mouth: Mucous membranes are moist.      Pharynx: Oropharynx is clear.    Eyes:      General: Right eye: No discharge. Left eye: No discharge. Conjunctiva/sclera: Conjunctivae normal.   Cardiovascular:      Rate and Rhythm: Normal rate and regular rhythm. Pulmonary:      Effort: Pulmonary effort is normal.      Breath sounds: Normal breath sounds. Abdominal:      Palpations: Abdomen is soft. Tenderness: There is no abdominal tenderness. Musculoskeletal: Normal range of motion. General: Tenderness present. No deformity. Skin:     General: Skin is warm. Capillary Refill: Capillary refill takes more than 3 seconds. Neurological:      General: No focal deficit present. Mental Status: He is alert and oriented to person, place, and time. Psychiatric:         Mood and Affect: Mood normal.         Behavior: Behavior normal.         DIAGNOSTIC RESULTS     RADIOLOGY:All plain film, CT, MRI, and formal ultrasound images (except ED bedside ultrasound) are read by the radiologist, see reports below, unless otherwise noted in MDM or here. Interpretation per the Radiologist below, if available at the time of this note:    No orders to display       LABS:  Labs Reviewed - No data to display    All other labs were within normal range or not returned as of this dictation. EMERGENCY DEPARTMENT COURSE and DIFFERENTIAL DIAGNOSIS/MDM:   Vitals:    Vitals:    20 1321 20 1324   BP:  (!) 127/59   Pulse:  100   Resp:  16   Temp:  98.4 °F (36.9 °C)   SpO2:  97%   Weight: 170 lb (77.1 kg)    Height: 6' (1.829 m)        Medical Decision Makin-year-old male who is afebrile and does not appear ill. No distress. I was able to touch base with both the podiatry resident. He is stable to be discharged home and have his surgery performed as an outpatient on Wednesday. He will follow-up in the office tomorrow. Patient was educated on the importance of follow-up. He was given a limited prescription of pain medication to ensure that he does follow-up.

## 2020-08-03 NOTE — PROGRESS NOTES
Narrator called to room to speak to patient. Patient is very upset, and agitated and verbally aggressive with staff. Patient pulled iv out while IV Vanco was infusing. Narrator spoke to patient, and patient will allow the narrator to restart the iv after his 1st Hibiclens bath.

## 2020-08-03 NOTE — PLAN OF CARE
Problem: Falls - Risk of:  Goal: Will remain free from falls  Description: Will remain free from falls  8/3/2020 0129 by Kaycee Hall RN  Outcome: Ongoing  8/2/2020 1346 by Rufina Bledsoe RN  Outcome: Ongoing  Note: Siderails up x 2  Hourly rounding  Call light in reach  Instructed to call for assist before attempting out of bed. Remains free from falls and accidental injury at this time   Floor free from obstacles  Bed is locked and in lowest position  Adequate lighting provided  Bed alarm on, [place] star and Stay with Me signs posted      Goal: Absence of physical injury  Description: Absence of physical injury  8/3/2020 0129 by Kaycee Hall RN  Outcome: Ongoing  8/2/2020 1346 by Rufina Bledsoe RN  Outcome: Ongoing     Problem: Pain:  Goal: Pain level will decrease  Description: Pain level will decrease  8/3/2020 0129 by Kaycee Hall RN  Outcome: Ongoing  8/2/2020 1346 by Rufina Bledsoe RN  Outcome: Ongoing  Note: Pain level assessment complete.    Patient educated on pain scale and control interventions  PRN pain medication given per patient request  Patient instructed to call out with new onset of pain or unrelieved pain    Goal: Control of acute pain  Description: Control of acute pain  8/3/2020 0129 by Kaycee Hall RN  Outcome: Ongoing  8/2/2020 1346 by Rufina Bledsoe RN  Outcome: Ongoing  Goal: Control of chronic pain  Description: Control of chronic pain  8/3/2020 0129 by Kaycee Hall RN  Outcome: Ongoing  8/2/2020 1346 by Rufina Bledsoe RN  Outcome: Ongoing     Problem: Serum Glucose Level - Abnormal:  Goal: Ability to maintain appropriate glucose levels will improve  Description: Ability to maintain appropriate glucose levels will improve  8/3/2020 0129 by Kaycee Hall RN  Outcome: Ongoing  8/2/2020 1346 by Rufina Bledsoe RN  Outcome: Ongoing     Problem: Skin Integrity:  Goal: Will show no infection signs and symptoms  Description: Will show no infection signs and symptoms  8/3/2020 0129 by Darío Fitzgerald RN  Outcome: Ongoing  8/2/2020 1346 by Abeba Ludwig RN  Outcome: Ongoing  Goal: Absence of new skin breakdown  Description: Absence of new skin breakdown  8/3/2020 0129 by Darío Fitzgerald RN  Outcome: Ongoing  8/2/2020 1346 by Abeba Ludwig RN  Outcome: Ongoing     Problem: Nutrition  Goal: Optimal nutrition therapy  8/3/2020 0129 by Darío Fitzgerald RN  Outcome: Ongoing  8/2/2020 1346 by Abeba Ludwig RN  Outcome: Ongoing

## 2020-08-03 NOTE — ED PROVIDER NOTES
[facility] ED  EMERGENCY DEPARTMENT ENCOUNTER   ATTENDING ATTESTATION     Pt Name: Kalpesh Muñoz  MRN: [id number]  Jessicagfyamilet 1955  Date of evaluation: 8/3/20       Kalpesh Muñoz is a 60 y.o. male who presents with Foot Injury (was inpatient x1 week, supposed to have left toes removed today, left hospital needs readmitted)      MDM:     21-year-old male presents with complaints of left-sided foot infectious process, he was scheduled to have an amputation today but left AGAINST MEDICAL ADVICE. Plan is consultation with podiatry and reevaluation. Vitals:   Vitals:    08/03/20 1321 08/03/20 1324   BP:  (!) 127/59   Pulse:  100   Resp:  16   Temp:  98.4 °F (36.9 °C)   SpO2:  97%   Weight: 170 lb (77.1 kg)    Height: 6' (1.829 m)          I personally evaluated and examined the patient in conjunction with the Midlevel provider and agree with the assessment, treatment plan, and disposition of the patient as recorded by the midlevel. I performed a history and physical examination of the patient and discussed management with the midlevel. I reviewed the midlevels note and agree with the documented findings and plan of care. Any areas of disagreement are noted on the chart. I was personally present for the key portions of any procedures. I have documented in the chart those procedures where I was not present during the key portions. I have personally reviewed all images and agree with the midlevel's interpretation. I have reviewed the emergency nurses triage note. I agree with the chief complaint, past medical history, past surgical history, allergies, medications, social and family history as documented unless otherwise noted.     Teresa Haq MD  Attending Emergency  Physician                  Cathy Henriquez MD  08/03/20 0382

## 2020-08-03 NOTE — PROGRESS NOTES
Writer received report on patient at bedside. Patient was awake and became upset at his experience and stay. Was upset that surgery was not done Friday or over the weekend. Writer explained they planned for this morning. Patient just became more upset. Patient stated he was leaving. Writer explained the doctors feel he needs surgery and the need for antibiotics, but patient didn't care and said he was leaving. Patient said he wasn't signing any papers and was leaving. IV was removed and patient gathered all of his belongings and was wheeled down to his vehicle.

## 2020-08-05 ENCOUNTER — HOSPITAL ENCOUNTER (INPATIENT)
Age: 65
LOS: 2 days | Discharge: NURSING, REHAB OR LONG-TERM CARE | DRG: 240 | End: 2020-08-07
Attending: PODIATRIST | Admitting: PODIATRIST
Payer: MEDICARE

## 2020-08-05 ENCOUNTER — ANESTHESIA (OUTPATIENT)
Dept: OPERATING ROOM | Age: 65
DRG: 240 | End: 2020-08-05
Payer: MEDICARE

## 2020-08-05 ENCOUNTER — APPOINTMENT (OUTPATIENT)
Dept: GENERAL RADIOLOGY | Age: 65
DRG: 240 | End: 2020-08-05
Attending: PODIATRIST
Payer: MEDICARE

## 2020-08-05 ENCOUNTER — ANESTHESIA EVENT (OUTPATIENT)
Dept: OPERATING ROOM | Age: 65
DRG: 240 | End: 2020-08-05
Payer: MEDICARE

## 2020-08-05 VITALS — SYSTOLIC BLOOD PRESSURE: 134 MMHG | TEMPERATURE: 96.3 F | OXYGEN SATURATION: 95 % | DIASTOLIC BLOOD PRESSURE: 62 MMHG

## 2020-08-05 PROBLEM — M86.9 OSTEOMYELITIS (HCC): Status: ACTIVE | Noted: 2020-08-05

## 2020-08-05 LAB
GLUCOSE BLD-MCNC: 141 MG/DL (ref 75–110)
GLUCOSE BLD-MCNC: 402 MG/DL (ref 75–110)
GLUCOSE BLD-MCNC: 410 MG/DL (ref 75–110)
GLUCOSE BLD-MCNC: 445 MG/DL (ref 75–110)
GLUCOSE BLD-MCNC: 99 MG/DL (ref 75–110)

## 2020-08-05 PROCEDURE — 82947 ASSAY GLUCOSE BLOOD QUANT: CPT

## 2020-08-05 PROCEDURE — 0Y6N0ZB DETACHMENT AT LEFT FOOT, PARTIAL 2ND RAY, OPEN APPROACH: ICD-10-PCS | Performed by: PODIATRIST

## 2020-08-05 PROCEDURE — 87070 CULTURE OTHR SPECIMN AEROBIC: CPT

## 2020-08-05 PROCEDURE — 3600000002 HC SURGERY LEVEL 2 BASE: Performed by: PODIATRIST

## 2020-08-05 PROCEDURE — 73630 X-RAY EXAM OF FOOT: CPT

## 2020-08-05 PROCEDURE — 87075 CULTR BACTERIA EXCEPT BLOOD: CPT

## 2020-08-05 PROCEDURE — 3700000000 HC ANESTHESIA ATTENDED CARE: Performed by: PODIATRIST

## 2020-08-05 PROCEDURE — 0L8W0ZZ DIVISION OF LEFT FOOT TENDON, OPEN APPROACH: ICD-10-PCS | Performed by: PODIATRIST

## 2020-08-05 PROCEDURE — 3600000012 HC SURGERY LEVEL 2 ADDTL 15MIN: Performed by: PODIATRIST

## 2020-08-05 PROCEDURE — 88311 DECALCIFY TISSUE: CPT

## 2020-08-05 PROCEDURE — 0Y6N0ZC DETACHMENT AT LEFT FOOT, PARTIAL 3RD RAY, OPEN APPROACH: ICD-10-PCS | Performed by: PODIATRIST

## 2020-08-05 PROCEDURE — 2709999900 HC NON-CHARGEABLE SUPPLY: Performed by: PODIATRIST

## 2020-08-05 PROCEDURE — 2580000003 HC RX 258: Performed by: NURSE ANESTHETIST, CERTIFIED REGISTERED

## 2020-08-05 PROCEDURE — 97110 THERAPEUTIC EXERCISES: CPT

## 2020-08-05 PROCEDURE — 2580000003 HC RX 258: Performed by: ANESTHESIOLOGY

## 2020-08-05 PROCEDURE — 6370000000 HC RX 637 (ALT 250 FOR IP): Performed by: STUDENT IN AN ORGANIZED HEALTH CARE EDUCATION/TRAINING PROGRAM

## 2020-08-05 PROCEDURE — 87205 SMEAR GRAM STAIN: CPT

## 2020-08-05 PROCEDURE — 7100000001 HC PACU RECOVERY - ADDTL 15 MIN: Performed by: PODIATRIST

## 2020-08-05 PROCEDURE — 3209999900 FLUORO FOR SURGICAL PROCEDURES

## 2020-08-05 PROCEDURE — 6360000002 HC RX W HCPCS: Performed by: STUDENT IN AN ORGANIZED HEALTH CARE EDUCATION/TRAINING PROGRAM

## 2020-08-05 PROCEDURE — 2500000003 HC RX 250 WO HCPCS: Performed by: PODIATRIST

## 2020-08-05 PROCEDURE — 88307 TISSUE EXAM BY PATHOLOGIST: CPT

## 2020-08-05 PROCEDURE — 2580000003 HC RX 258: Performed by: STUDENT IN AN ORGANIZED HEALTH CARE EDUCATION/TRAINING PROGRAM

## 2020-08-05 PROCEDURE — 6360000002 HC RX W HCPCS: Performed by: NURSE ANESTHETIST, CERTIFIED REGISTERED

## 2020-08-05 PROCEDURE — 3700000001 HC ADD 15 MINUTES (ANESTHESIA): Performed by: PODIATRIST

## 2020-08-05 PROCEDURE — 0Y6N0Z9 DETACHMENT AT LEFT FOOT, PARTIAL 1ST RAY, OPEN APPROACH: ICD-10-PCS | Performed by: PODIATRIST

## 2020-08-05 PROCEDURE — 7100000000 HC PACU RECOVERY - FIRST 15 MIN: Performed by: PODIATRIST

## 2020-08-05 PROCEDURE — 2500000003 HC RX 250 WO HCPCS: Performed by: NURSE ANESTHETIST, CERTIFIED REGISTERED

## 2020-08-05 PROCEDURE — 0Y6N0ZD DETACHMENT AT LEFT FOOT, PARTIAL 4TH RAY, OPEN APPROACH: ICD-10-PCS | Performed by: PODIATRIST

## 2020-08-05 PROCEDURE — 73620 X-RAY EXAM OF FOOT: CPT

## 2020-08-05 PROCEDURE — 0Y6N0ZF DETACHMENT AT LEFT FOOT, PARTIAL 5TH RAY, OPEN APPROACH: ICD-10-PCS | Performed by: PODIATRIST

## 2020-08-05 PROCEDURE — 99221 1ST HOSP IP/OBS SF/LOW 40: CPT | Performed by: INTERNAL MEDICINE

## 2020-08-05 PROCEDURE — 1200000000 HC SEMI PRIVATE

## 2020-08-05 PROCEDURE — 97162 PT EVAL MOD COMPLEX 30 MIN: CPT

## 2020-08-05 RX ORDER — BUPIVACAINE HYDROCHLORIDE 5 MG/ML
INJECTION, SOLUTION EPIDURAL; INTRACAUDAL PRN
Status: DISCONTINUED | OUTPATIENT
Start: 2020-08-05 | End: 2020-08-05 | Stop reason: HOSPADM

## 2020-08-05 RX ORDER — SODIUM CHLORIDE 0.9 % (FLUSH) 0.9 %
10 SYRINGE (ML) INJECTION PRN
Status: DISCONTINUED | OUTPATIENT
Start: 2020-08-05 | End: 2020-08-07 | Stop reason: HOSPADM

## 2020-08-05 RX ORDER — ONDANSETRON 2 MG/ML
4 INJECTION INTRAMUSCULAR; INTRAVENOUS
Status: DISCONTINUED | OUTPATIENT
Start: 2020-08-05 | End: 2020-08-05 | Stop reason: HOSPADM

## 2020-08-05 RX ORDER — FENTANYL CITRATE 50 UG/ML
25 INJECTION, SOLUTION INTRAMUSCULAR; INTRAVENOUS EVERY 5 MIN PRN
Status: DISCONTINUED | OUTPATIENT
Start: 2020-08-05 | End: 2020-08-05 | Stop reason: HOSPADM

## 2020-08-05 RX ORDER — MORPHINE SULFATE 2 MG/ML
2 INJECTION, SOLUTION INTRAMUSCULAR; INTRAVENOUS
Status: DISCONTINUED | OUTPATIENT
Start: 2020-08-05 | End: 2020-08-07

## 2020-08-05 RX ORDER — OXYCODONE HYDROCHLORIDE AND ACETAMINOPHEN 5; 325 MG/1; MG/1
1 TABLET ORAL EVERY 4 HOURS PRN
Status: DISCONTINUED | OUTPATIENT
Start: 2020-08-05 | End: 2020-08-07 | Stop reason: HOSPADM

## 2020-08-05 RX ORDER — SODIUM CHLORIDE 0.9 % (FLUSH) 0.9 %
10 SYRINGE (ML) INJECTION EVERY 12 HOURS SCHEDULED
Status: DISCONTINUED | OUTPATIENT
Start: 2020-08-05 | End: 2020-08-05 | Stop reason: HOSPADM

## 2020-08-05 RX ORDER — ATORVASTATIN CALCIUM 40 MG/1
40 TABLET, FILM COATED ORAL DAILY
Status: DISCONTINUED | OUTPATIENT
Start: 2020-08-05 | End: 2020-08-07 | Stop reason: HOSPADM

## 2020-08-05 RX ORDER — SODIUM CHLORIDE 9 MG/ML
INJECTION, SOLUTION INTRAVENOUS CONTINUOUS
Status: DISCONTINUED | OUTPATIENT
Start: 2020-08-05 | End: 2020-08-05

## 2020-08-05 RX ORDER — SODIUM CHLORIDE, SODIUM LACTATE, POTASSIUM CHLORIDE, CALCIUM CHLORIDE 600; 310; 30; 20 MG/100ML; MG/100ML; MG/100ML; MG/100ML
INJECTION, SOLUTION INTRAVENOUS CONTINUOUS
Status: DISCONTINUED | OUTPATIENT
Start: 2020-08-05 | End: 2020-08-05

## 2020-08-05 RX ORDER — SODIUM CHLORIDE 0.9 % (FLUSH) 0.9 %
10 SYRINGE (ML) INJECTION EVERY 12 HOURS SCHEDULED
Status: DISCONTINUED | OUTPATIENT
Start: 2020-08-05 | End: 2020-08-07 | Stop reason: HOSPADM

## 2020-08-05 RX ORDER — NICOTINE POLACRILEX 4 MG
15 LOZENGE BUCCAL PRN
Status: DISCONTINUED | OUTPATIENT
Start: 2020-08-05 | End: 2020-08-07 | Stop reason: HOSPADM

## 2020-08-05 RX ORDER — SODIUM CHLORIDE 0.9 % (FLUSH) 0.9 %
10 SYRINGE (ML) INJECTION PRN
Status: DISCONTINUED | OUTPATIENT
Start: 2020-08-05 | End: 2020-08-05 | Stop reason: HOSPADM

## 2020-08-05 RX ORDER — PHENYLEPHRINE HCL IN 0.9% NACL 1 MG/10 ML
SYRINGE (ML) INTRAVENOUS PRN
Status: DISCONTINUED | OUTPATIENT
Start: 2020-08-05 | End: 2020-08-05 | Stop reason: SDUPTHER

## 2020-08-05 RX ORDER — ONDANSETRON 2 MG/ML
INJECTION INTRAMUSCULAR; INTRAVENOUS PRN
Status: DISCONTINUED | OUTPATIENT
Start: 2020-08-05 | End: 2020-08-05 | Stop reason: SDUPTHER

## 2020-08-05 RX ORDER — VANCOMYCIN HYDROCHLORIDE 500 MG/10ML
1 INJECTION, POWDER, LYOPHILIZED, FOR SOLUTION INTRAVENOUS ONCE
Status: DISCONTINUED | OUTPATIENT
Start: 2020-08-05 | End: 2020-08-05 | Stop reason: DRUGHIGH

## 2020-08-05 RX ORDER — POLYETHYLENE GLYCOL 3350 17 G/17G
17 POWDER, FOR SOLUTION ORAL DAILY PRN
Status: DISCONTINUED | OUTPATIENT
Start: 2020-08-05 | End: 2020-08-07 | Stop reason: HOSPADM

## 2020-08-05 RX ORDER — PROMETHAZINE HYDROCHLORIDE 12.5 MG/1
12.5 TABLET ORAL EVERY 6 HOURS PRN
Status: DISCONTINUED | OUTPATIENT
Start: 2020-08-05 | End: 2020-08-07 | Stop reason: HOSPADM

## 2020-08-05 RX ORDER — DEXTROSE MONOHYDRATE 50 MG/ML
100 INJECTION, SOLUTION INTRAVENOUS PRN
Status: DISCONTINUED | OUTPATIENT
Start: 2020-08-05 | End: 2020-08-07 | Stop reason: HOSPADM

## 2020-08-05 RX ORDER — DEXTROSE MONOHYDRATE 25 G/50ML
12.5 INJECTION, SOLUTION INTRAVENOUS PRN
Status: DISCONTINUED | OUTPATIENT
Start: 2020-08-05 | End: 2020-08-07 | Stop reason: HOSPADM

## 2020-08-05 RX ORDER — HYDROMORPHONE HCL 110MG/55ML
0.25 PATIENT CONTROLLED ANALGESIA SYRINGE INTRAVENOUS EVERY 5 MIN PRN
Status: DISCONTINUED | OUTPATIENT
Start: 2020-08-05 | End: 2020-08-05 | Stop reason: HOSPADM

## 2020-08-05 RX ORDER — DEXAMETHASONE SODIUM PHOSPHATE 10 MG/ML
INJECTION INTRAMUSCULAR; INTRAVENOUS PRN
Status: DISCONTINUED | OUTPATIENT
Start: 2020-08-05 | End: 2020-08-05 | Stop reason: SDUPTHER

## 2020-08-05 RX ORDER — ACETAMINOPHEN 325 MG/1
650 TABLET ORAL EVERY 6 HOURS PRN
Status: DISCONTINUED | OUTPATIENT
Start: 2020-08-05 | End: 2020-08-07 | Stop reason: HOSPADM

## 2020-08-05 RX ORDER — ONDANSETRON 2 MG/ML
4 INJECTION INTRAMUSCULAR; INTRAVENOUS EVERY 6 HOURS PRN
Status: DISCONTINUED | OUTPATIENT
Start: 2020-08-05 | End: 2020-08-07 | Stop reason: HOSPADM

## 2020-08-05 RX ORDER — LIDOCAINE HYDROCHLORIDE 10 MG/ML
1 INJECTION, SOLUTION EPIDURAL; INFILTRATION; INTRACAUDAL; PERINEURAL
Status: DISCONTINUED | OUTPATIENT
Start: 2020-08-05 | End: 2020-08-05 | Stop reason: HOSPADM

## 2020-08-05 RX ORDER — SODIUM CHLORIDE, SODIUM LACTATE, POTASSIUM CHLORIDE, CALCIUM CHLORIDE 600; 310; 30; 20 MG/100ML; MG/100ML; MG/100ML; MG/100ML
INJECTION, SOLUTION INTRAVENOUS CONTINUOUS PRN
Status: DISCONTINUED | OUTPATIENT
Start: 2020-08-05 | End: 2020-08-05 | Stop reason: SDUPTHER

## 2020-08-05 RX ORDER — LIDOCAINE HYDROCHLORIDE 20 MG/ML
INJECTION, SOLUTION EPIDURAL; INFILTRATION; INTRACAUDAL; PERINEURAL PRN
Status: DISCONTINUED | OUTPATIENT
Start: 2020-08-05 | End: 2020-08-05 | Stop reason: SDUPTHER

## 2020-08-05 RX ORDER — ACETAMINOPHEN 650 MG/1
650 SUPPOSITORY RECTAL EVERY 6 HOURS PRN
Status: DISCONTINUED | OUTPATIENT
Start: 2020-08-05 | End: 2020-08-07 | Stop reason: HOSPADM

## 2020-08-05 RX ORDER — FENTANYL CITRATE 50 UG/ML
INJECTION, SOLUTION INTRAMUSCULAR; INTRAVENOUS PRN
Status: DISCONTINUED | OUTPATIENT
Start: 2020-08-05 | End: 2020-08-05 | Stop reason: SDUPTHER

## 2020-08-05 RX ORDER — ASPIRIN 81 MG/1
81 TABLET, CHEWABLE ORAL DAILY
Status: DISCONTINUED | OUTPATIENT
Start: 2020-08-05 | End: 2020-08-07 | Stop reason: HOSPADM

## 2020-08-05 RX ORDER — OXYCODONE HYDROCHLORIDE AND ACETAMINOPHEN 5; 325 MG/1; MG/1
2 TABLET ORAL EVERY 4 HOURS PRN
Status: DISCONTINUED | OUTPATIENT
Start: 2020-08-05 | End: 2020-08-07 | Stop reason: HOSPADM

## 2020-08-05 RX ORDER — PROPOFOL 10 MG/ML
INJECTION, EMULSION INTRAVENOUS PRN
Status: DISCONTINUED | OUTPATIENT
Start: 2020-08-05 | End: 2020-08-05 | Stop reason: SDUPTHER

## 2020-08-05 RX ORDER — MORPHINE SULFATE 4 MG/ML
4 INJECTION, SOLUTION INTRAMUSCULAR; INTRAVENOUS
Status: DISCONTINUED | OUTPATIENT
Start: 2020-08-05 | End: 2020-08-07

## 2020-08-05 RX ORDER — MIDAZOLAM HYDROCHLORIDE 1 MG/ML
INJECTION INTRAMUSCULAR; INTRAVENOUS PRN
Status: DISCONTINUED | OUTPATIENT
Start: 2020-08-05 | End: 2020-08-05 | Stop reason: SDUPTHER

## 2020-08-05 RX ORDER — FENTANYL CITRATE 50 UG/ML
50 INJECTION, SOLUTION INTRAMUSCULAR; INTRAVENOUS EVERY 5 MIN PRN
Status: DISCONTINUED | OUTPATIENT
Start: 2020-08-05 | End: 2020-08-05 | Stop reason: HOSPADM

## 2020-08-05 RX ORDER — HYDROMORPHONE HCL 110MG/55ML
0.5 PATIENT CONTROLLED ANALGESIA SYRINGE INTRAVENOUS EVERY 5 MIN PRN
Status: DISCONTINUED | OUTPATIENT
Start: 2020-08-05 | End: 2020-08-05 | Stop reason: HOSPADM

## 2020-08-05 RX ADMIN — ASPIRIN 81 MG 81 MG: 81 TABLET ORAL at 15:20

## 2020-08-05 RX ADMIN — SUGAMMADEX 200 MG: 100 INJECTION, SOLUTION INTRAVENOUS at 09:27

## 2020-08-05 RX ADMIN — Medication 100 MCG: at 09:28

## 2020-08-05 RX ADMIN — SODIUM CHLORIDE, POTASSIUM CHLORIDE, SODIUM LACTATE AND CALCIUM CHLORIDE: 600; 310; 30; 20 INJECTION, SOLUTION INTRAVENOUS at 07:40

## 2020-08-05 RX ADMIN — SODIUM CHLORIDE, POTASSIUM CHLORIDE, SODIUM LACTATE AND CALCIUM CHLORIDE: 600; 310; 30; 20 INJECTION, SOLUTION INTRAVENOUS at 06:50

## 2020-08-05 RX ADMIN — Medication 100 MCG: at 08:23

## 2020-08-05 RX ADMIN — MIDAZOLAM 2 MG: 1 INJECTION INTRAMUSCULAR; INTRAVENOUS at 07:40

## 2020-08-05 RX ADMIN — OXYCODONE HYDROCHLORIDE AND ACETAMINOPHEN 2 TABLET: 5; 325 TABLET ORAL at 15:20

## 2020-08-05 RX ADMIN — Medication 200 MCG: at 08:40

## 2020-08-05 RX ADMIN — APIXABAN 5 MG: 5 TABLET, FILM COATED ORAL at 15:20

## 2020-08-05 RX ADMIN — ONDANSETRON 4 MG: 2 INJECTION, SOLUTION INTRAMUSCULAR; INTRAVENOUS at 09:04

## 2020-08-05 RX ADMIN — MORPHINE SULFATE 2 MG: 2 INJECTION, SOLUTION INTRAMUSCULAR; INTRAVENOUS at 22:07

## 2020-08-05 RX ADMIN — ATORVASTATIN CALCIUM 40 MG: 40 TABLET, FILM COATED ORAL at 15:20

## 2020-08-05 RX ADMIN — Medication 100 MCG: at 09:19

## 2020-08-05 RX ADMIN — OXYCODONE HYDROCHLORIDE AND ACETAMINOPHEN 2 TABLET: 5; 325 TABLET ORAL at 20:32

## 2020-08-05 RX ADMIN — DEXAMETHASONE SODIUM PHOSPHATE 10 MG: 10 INJECTION INTRAMUSCULAR; INTRAVENOUS at 07:45

## 2020-08-05 RX ADMIN — MORPHINE SULFATE 2 MG: 2 INJECTION, SOLUTION INTRAMUSCULAR; INTRAVENOUS at 14:07

## 2020-08-05 RX ADMIN — Medication 100 MCG: at 08:54

## 2020-08-05 RX ADMIN — Medication 100 MCG: at 07:45

## 2020-08-05 RX ADMIN — Medication 200 MCG: at 08:58

## 2020-08-05 RX ADMIN — Medication 100 MCG: at 08:32

## 2020-08-05 RX ADMIN — Medication 200 MCG: at 08:15

## 2020-08-05 RX ADMIN — Medication 100 MCG: at 07:56

## 2020-08-05 RX ADMIN — Medication 100 MCG: at 08:11

## 2020-08-05 RX ADMIN — Medication 100 MCG: at 08:02

## 2020-08-05 RX ADMIN — SODIUM CHLORIDE, PRESERVATIVE FREE 10 ML: 5 INJECTION INTRAVENOUS at 20:36

## 2020-08-05 RX ADMIN — VANCOMYCIN HYDROCHLORIDE 1250 MG: 1.25 INJECTION, POWDER, LYOPHILIZED, FOR SOLUTION INTRAVENOUS at 20:33

## 2020-08-05 RX ADMIN — APIXABAN 5 MG: 5 TABLET, FILM COATED ORAL at 20:32

## 2020-08-05 RX ADMIN — POLYETHYLENE GLYCOL 3350 17 G: 17 POWDER, FOR SOLUTION ORAL at 14:44

## 2020-08-05 RX ADMIN — MORPHINE SULFATE 2 MG: 2 INJECTION, SOLUTION INTRAMUSCULAR; INTRAVENOUS at 16:58

## 2020-08-05 RX ADMIN — INSULIN LISPRO 3 UNITS: 100 INJECTION, SOLUTION INTRAVENOUS; SUBCUTANEOUS at 20:57

## 2020-08-05 RX ADMIN — Medication 100 MCG: at 08:07

## 2020-08-05 RX ADMIN — Medication 100 MCG: at 07:55

## 2020-08-05 RX ADMIN — LIDOCAINE HYDROCHLORIDE 60 MG: 20 INJECTION, SOLUTION EPIDURAL; INFILTRATION; INTRACAUDAL; PERINEURAL at 07:45

## 2020-08-05 RX ADMIN — VANCOMYCIN HYDROCHLORIDE 1250 MG: 1.25 INJECTION, POWDER, LYOPHILIZED, FOR SOLUTION INTRAVENOUS at 07:55

## 2020-08-05 RX ADMIN — PROPOFOL 150 MG: 10 INJECTION, EMULSION INTRAVENOUS at 07:45

## 2020-08-05 RX ADMIN — Medication 100 MCG: at 08:18

## 2020-08-05 RX ADMIN — INSULIN LISPRO 6 UNITS: 100 INJECTION, SOLUTION INTRAVENOUS; SUBCUTANEOUS at 16:59

## 2020-08-05 ASSESSMENT — PULMONARY FUNCTION TESTS
PIF_VALUE: 3
PIF_VALUE: 20
PIF_VALUE: 22
PIF_VALUE: 1
PIF_VALUE: 32
PIF_VALUE: 20
PIF_VALUE: 20
PIF_VALUE: 0
PIF_VALUE: 20
PIF_VALUE: 23
PIF_VALUE: 24
PIF_VALUE: 22
PIF_VALUE: 20
PIF_VALUE: 19
PIF_VALUE: 20
PIF_VALUE: 19
PIF_VALUE: 20
PIF_VALUE: 0
PIF_VALUE: 20
PIF_VALUE: 22
PIF_VALUE: 20
PIF_VALUE: 19
PIF_VALUE: 19
PIF_VALUE: 20
PIF_VALUE: 22
PIF_VALUE: 15
PIF_VALUE: 22
PIF_VALUE: 20
PIF_VALUE: 22
PIF_VALUE: 20
PIF_VALUE: 20
PIF_VALUE: 23
PIF_VALUE: 0
PIF_VALUE: 22
PIF_VALUE: 22
PIF_VALUE: 0
PIF_VALUE: 22
PIF_VALUE: 1
PIF_VALUE: 20
PIF_VALUE: 27
PIF_VALUE: 20
PIF_VALUE: 20
PIF_VALUE: 22
PIF_VALUE: 20
PIF_VALUE: 20
PIF_VALUE: 22
PIF_VALUE: 13
PIF_VALUE: 23
PIF_VALUE: 20
PIF_VALUE: 28
PIF_VALUE: 0
PIF_VALUE: 23
PIF_VALUE: 20
PIF_VALUE: 23
PIF_VALUE: 19
PIF_VALUE: 20
PIF_VALUE: 23
PIF_VALUE: 22
PIF_VALUE: 19
PIF_VALUE: 22
PIF_VALUE: 20
PIF_VALUE: 19
PIF_VALUE: 19
PIF_VALUE: 20
PIF_VALUE: 23
PIF_VALUE: 9
PIF_VALUE: 29
PIF_VALUE: 0
PIF_VALUE: 22
PIF_VALUE: 1
PIF_VALUE: 20
PIF_VALUE: 12
PIF_VALUE: 20
PIF_VALUE: 20
PIF_VALUE: 0
PIF_VALUE: 22
PIF_VALUE: 23
PIF_VALUE: 19
PIF_VALUE: 20
PIF_VALUE: 19
PIF_VALUE: 20
PIF_VALUE: 23
PIF_VALUE: 20
PIF_VALUE: 22
PIF_VALUE: 20
PIF_VALUE: 20
PIF_VALUE: 25
PIF_VALUE: 20
PIF_VALUE: 22
PIF_VALUE: 20
PIF_VALUE: 22
PIF_VALUE: 20
PIF_VALUE: 1
PIF_VALUE: 22
PIF_VALUE: 19
PIF_VALUE: 20
PIF_VALUE: 0
PIF_VALUE: 20
PIF_VALUE: 22
PIF_VALUE: 0
PIF_VALUE: 19
PIF_VALUE: 22
PIF_VALUE: 20
PIF_VALUE: 19
PIF_VALUE: 20
PIF_VALUE: 20
PIF_VALUE: 22
PIF_VALUE: 27
PIF_VALUE: 25
PIF_VALUE: 20
PIF_VALUE: 20
PIF_VALUE: 19
PIF_VALUE: 22
PIF_VALUE: 20
PIF_VALUE: 19
PIF_VALUE: 19

## 2020-08-05 ASSESSMENT — ENCOUNTER SYMPTOMS
VOMITING: 0
DIARRHEA: 0
SHORTNESS OF BREATH: 0
NAUSEA: 0
COLOR CHANGE: 0
CONSTIPATION: 0
ABDOMINAL PAIN: 0

## 2020-08-05 ASSESSMENT — PAIN SCALES - GENERAL
PAINLEVEL_OUTOF10: 8
PAINLEVEL_OUTOF10: 6
PAINLEVEL_OUTOF10: 8
PAINLEVEL_OUTOF10: 0
PAINLEVEL_OUTOF10: 6
PAINLEVEL_OUTOF10: 8
PAINLEVEL_OUTOF10: 0
PAINLEVEL_OUTOF10: 8
PAINLEVEL_OUTOF10: 0
PAINLEVEL_OUTOF10: 6
PAINLEVEL_OUTOF10: 8
PAINLEVEL_OUTOF10: 0
PAINLEVEL_OUTOF10: 8
PAINLEVEL_OUTOF10: 0
PAINLEVEL_OUTOF10: 8
PAINLEVEL_OUTOF10: 6
PAINLEVEL_OUTOF10: 6
PAINLEVEL_OUTOF10: 0
PAINLEVEL_OUTOF10: 0
PAINLEVEL_OUTOF10: 8
PAINLEVEL_OUTOF10: 0

## 2020-08-05 ASSESSMENT — LIFESTYLE VARIABLES: SMOKING_STATUS: 1

## 2020-08-05 ASSESSMENT — PAIN DESCRIPTION - LOCATION: LOCATION: LEG

## 2020-08-05 ASSESSMENT — PAIN DESCRIPTION - ORIENTATION: ORIENTATION: LEFT

## 2020-08-05 ASSESSMENT — PAIN - FUNCTIONAL ASSESSMENT: PAIN_FUNCTIONAL_ASSESSMENT: 0-10

## 2020-08-05 NOTE — PROGRESS NOTES
Inject 1 each into the skin 3 times daily TO CHECK FLUCTUATING BLOOD SUGARS IE HYPERGLYCEMIA 6/19/20   Rolande Nissen, DO   ipratropium-albuterol (DUONEB) 0.5-2.5 (3) MG/3ML SOLN nebulizer solution Inhale 3 mLs into the lungs every 4 hours (while awake) 5/28/20   Rolande Nissen, DO   apixaban (ELIQUIS) 5 MG TABS tablet Take 1 tablet by mouth 2 times daily 3/9/20   Rolande Nissen, DO   atorvastatin (LIPITOR) 40 MG tablet Take 1 tablet by mouth daily 2/26/20   Srinivasan Burden, DO   Insulin Pen Needle 32G X 4 MM MISC 1 each by Does not apply route daily 1/14/19   Rolande Nissen, DO

## 2020-08-05 NOTE — FLOWSHEET NOTE
Pt states that he has to have a BM. Pt helped to the bathroom with wheelchair. Pt NWB left foot. Pt states that he feels like he is constipated and bloated, states that he feels like he has to have a BM, but it won't come out. Requests an enema and/or miralax. Order released for Miralax from floor orders. Pt states that he wants to sit in the bathroom on the toilet until he is able to go. After 40 mins pt convinced that he can come back to bed and await medication to help with constipation. Pt escorted back to bed with assistance. VS stable. Pt stable, no pain at this time.
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
## 2020-08-05 NOTE — BRIEF OP NOTE
PODIATRY BRIEF OP NOTE    PATIENT NAME: Woody Liang  YOB: 1955  -  60 y.o. male  MRN: [id number]  DATE: 8/5/2020  BILLING #: [id number]    Surgeon(s):  Rose Trevino DPM     ASSISTANTS: Trenton Sherman DPM PGY 2    PRE-OP DIAGNOSIS:   1. Osteomyelitis left foot  2. Dry gangrene, left foot  3. Type 2 diabetes    POST-OP DIAGNOSIS: Same as above. PROCEDURE:   1. Transmetatarsal amputation, left foot  2. Tendo Achilles lengthening, left foot  3. Application of posterior splint, left foot    ANESTHESIA: General with local ( 20 cc of 1:1 mixture 1% lidocaine plain and 0.5% marcaine plain)    HEMOSTASIS: Pneumatic thigh left tourniquet @250 mmHg for 59 minutes minutes. ESTIMATED BLOOD LOSS: Less than 50 cc. MATERIALS: 2-0 nylon  * No implants in log *    INJECTABLES: 10 cc of 0.5% Marcaine plain    SPECIMEN: Left forefoot for pathology, culture swabs  ID Type Source Tests Collected by Time Destination   1 : LEFT FOOT CULTURES Swab Foot CULTURE, ANAEROBIC AND AEROBIC Rose Trevino DPM 8/5/2020 0912    A : LEFT FOREFOOT Tissue Foot SURGICAL PATHOLOGY Rose Trevino DPM 8/5/2020 7455        COMPLICATIONS: None    FINDINGS: Parabola maintained. All fibrotic and necrotic tissue debrided until bleeding tissue noted. No purulence appreciated. The patient was counseled at length about the risks of neema Covid-19 during their perioperative period and any recovery window from their procedure. The patient was made aware that neema Covid-19  may worsen their prognosis for recovering from their procedure  and lend to a higher morbidity and/or mortality risk. All material risks, benefits, and reasonable alternatives including postponing the procedure were discussed. The patient does wish to proceed with the procedure at this time.     Trenton Sherman DPM   Podiatric Medicine & Surgery   8/5/2020 at 10:04 AM

## 2020-08-05 NOTE — INTERVAL H&P NOTE
History and Physical Update    Pt Name: Geovani Bright  MRN: [id number]  YOB: 1955  Date of evaluation: 8/5/2020      [x] I have reviewed the H&P by Kaitlin Laboy CNP dated 7/27/20 which meets the criteria for an Interval History and Physical note. .    [x] I have examined  Geovani Bright, a 59 yo male with known poorly controlled DM, COPD, PVD and other comorbidities managed by specialist  The patient is scheduled for a left transmetatarsal amputation by Dr Ximena Pitts for osteomyelitis. Hx blood clots on anticoagulants that were given during hospitalization but has \"not taken ASA 81mg or Eliquis for > month\"  The patient has Hx COPD continues to smoke and last cigarette was prior to entering the hospital today   He denies fever, chills, wheezing, increased SOB, chest pain    +DM POC BS 99     Vital signs: /74   Pulse 98   Temp 96.8 °F (36 °C) (Temporal)   Resp 16   Ht 6' (1.829 m)   Wt 170 lb (77.1 kg)   SpO2 94%   BMI 23.06 kg/m²     Allergies:  Gabapentin    Medications:    Prior to Admission medications    Medication Sig Start Date End Date Taking? Authorizing Provider   metFORMIN (GLUCOPHAGE) 500 MG tablet Take 1 tablet by mouth 2 times daily (with meals) 3/9/20  Yes Rolande Nissen,    insulin lispro (HUMALOG) 100 UNIT/ML injection vial Inject 0-18 Units into the skin 3 times daily (with meals) 2/26/20  Yes Srinivasan Burden,    insulin lispro (HUMALOG) 100 UNIT/ML injection vial Inject 0-9 Units into the skin nightly 2/26/20  Yes Srinivasan Burden,    HYDROcodone-acetaminophen (NORCO) 5-325 MG per tablet Take 1 tablet by mouth every 6 hours as needed for Pain for up to 7 days.  8/3/20 8/10/20  MARCELO Alicia CNP   Insulin Degludec (TRESIBA FLEXTOUCH) 100 UNIT/ML SOPN Inject 70 Units into the skin nightly    Historical Provider, MD   pantoprazole (PROTONIX) 40 MG tablet Take 40 mg by mouth every morning (before breakfast)    Historical Provider, MD   glucose monitoring kit (FREESTYLE) monitoring kit 1 kit by Does not apply route daily 7/13/20   Dutch Lizarraga DO   Lancets MISC 1 each by Does not apply route 2 times daily 7/13/20   Dutch Lizarraga DO   blood glucose test strips (ASCENSIA AUTODISC VI;ONE TOUCH ULTRA TEST VI) strip Use with associated glucose meter to check fluctuating blood sugars BID 7/13/20   Dutch Lizarraga DO   aspirin 81 MG chewable tablet 81mg  tablet by oral route  every day    Historical Provider, MD   TRUEplus Lancets 30G MISC Inject 1 each into the skin 3 times daily TO CHECK FLUCTUATING BLOOD SUGARS IE HYPERGLYCEMIA 6/19/20   Dutch Lizarraga DO   ipratropium-albuterol (DUONEB) 0.5-2.5 (3) MG/3ML SOLN nebulizer solution Inhale 3 mLs into the lungs every 4 hours (while awake) 5/28/20   Dutch Lizarraga DO   apixaban (ELIQUIS) 5 MG TABS tablet Take 1 tablet by mouth 2 times daily 3/9/20   Dutch Lizarraga DO   atorvastatin (LIPITOR) 40 MG tablet Take 1 tablet by mouth daily 2/26/20   Anya Hamm DO   Insulin Pen Needle 32G X 4 MM MISC 1 each by Does not apply route daily 1/14/19   Dutch Lizarraga DO         This is a 60 y.o. male who is pleasant, cooperative, alert and oriented x3, in no acute distress. Heart: Heart sounds are normal.  HR 98 regular rate and rhythm without murmur, gallop or rub. Lungs:+cough  Normal respiratory effort, rhonchi that cleared mostly with cough, bibasilar crackles, unlabored at rest w/o use of accessory muscles, or wheezing   Abdomen: soft, nontender, nondistended with bowel sounds. Labs:  Recent Labs     08/03/20  0505  07/29/20  0923   HGB 10.6*   < >  --    HCT 35.0*   < >  --    WBC 10.4   < >  --    MCV 96.7   < >  --       < >  --       < >  --    K 4.0   < >  --       < >  --    CO2 26   < >  --    BUN 18   < >  --    CREATININE 0.83   < >  --    GLUCOSE 154*   < >  --    INR  --   --  1.0   PROTIME  --   --  13.1    < > = values in this interval not displayed.        Recent Labs     07/29/20  8772 COVID19             Not Detected       Shad Peters  APRN, ANP-BC  Electronically signed 8/5/2020 at 6:59 AM
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
## 2020-08-05 NOTE — CONSULTS
hypertension    Uncontrolled type 2 diabetes mellitus with hyperglycemia (HCC)    History of pulmonary embolism - 2017    Atelectasis    Uncontrolled type 2 diabetes mellitus with foot ulcer (HCC)    Azotemia    Anemia, normocytic normochromic    Osteomyelitis of fourth phalange of left foot (HCC)    Drug-induced constipation    Osteomyelitis (HCC)       Height:   182.9 cm  Wt Readings from Last 1 Encounters:   08/05/20 170 lb (77.1 kg)     Allergies:  Gabapentin       No results found for: PROCAL  Recent Labs     08/03/20  0505   BUN 18     Recent Labs     08/03/20  0505   CREATININE 0.83     Recent Labs     08/03/20  0505   WBC 10.4       Intake/Output Summary (Last 24 hours) at 8/5/2020 1431  Last data filed at 8/5/2020 1028  Gross per 24 hour   Intake 670 ml   Output 10 ml   Net 660 ml       Temp: 98.4 F    Culture Date  /      Rheta Demark               /      Results  8/5/20                anaerobic/aerobic         Pending       Actual Weight:    Wt Readings from Last 1 Encounters:   08/05/20 170 lb (77.1 kg)     CrCl based on IBW\"  100.6 mL/min    PLAN   Initial loading dose of 25mg/kg (max of 2500 mg) = No initial bolus given. First dose (1250 mg) given in Pre-op 07:55  2. Vancomycin 1250 mg IV every 12 hours. 3.   Ensured BUN/sCr ordered at baseline and at least every 3rd day. 4.   ONLY for suspected pneumonia or COPD: MRSA nasal swab  is not ordered. Non respiratory infection. .    5. Trough ordered for: 8/6/20 19:00. Trough Goals (Non-dialysis patient) Peaks are not routinely recommended. 10-20 mcg/mL for mild skin/soft tissue infections or UTI.  15-20 mcg/mL is the target trough for all other indications that MRSA infection is suspected. TROUGH TIMING: (Additional levels drawn based on renal function and/or clinical response).   Dosing interval Timing of Trough   Every 8 hr, 12 hr, or 18 hr regimen Prior to the 4th dose  Twice weekly troughs for every 8 hour dosing   Every 24 hr regimen Prior to the 3rd or 4th dose   Every 36 hr regimen Prior to the 3rd dose       Thank you for the consult. Pharmacy will continue to follow.   Kerrin Babinski, RPh  8/5/2020  2:31 PM

## 2020-08-05 NOTE — OP NOTE
conservative treatment and surgical treatment has been recommended to which the patient is amenable. In pre-op all risks and rewards of the procedure were discussed at length prior to the patient signing the consent form. Consent is signed, witnessed and placed in the patient chart. The left foot was marked, abx hung (Vancomycin), labs and images reviewed, NPO status confirmed. No guarantees were given or implied. PROCEDURE IN DETAIL: The patient was brought to the operating room and placed on the operating table in the supine position with a safety strap across the lap. A well-padded pneumatic thigh tourniquet was applied to the left thigh. After adequate sedation was given by the anesthesia team, a local block of 20 cc of 1:1 mixture of 1% lidocaine plain and 0.5% Marcaine plain was injected to the left foot preoperatively. The surgical site was then scrubbed, prepped, and draped in the usual aseptic manner. A timeout was performed confirming the patient's identity, correct site, correct procedure, allergies, and preoperative antibiotics. Attention was directed to the dorsal aspect of the left foot where a fish mouth incision was created using a #15 blade. The incision was made circumferentially around the foot at the level of the midshaft of the metatarsals creating a long plantar flap. The incision was carried down to the level of bone taking care to ligate any bleeders encountered. At this time the tourniquet was inflated to 250 mmHg due to increased bleeding. The forefoot was disarticulated at the level of the MPJ. The forefoot was passed to the back to table to be sent to pathology. Utilizing sharp disection, the soft tissue was freed from the metatarsals at the level of the midshaft. Metatarsals one through five were resected at the midshaft utilizing a sagittal saw recreating the normal parabola of the foot.  The bone fragments, plantar plate, capsule from MPJs 1-5, and sesamoids were excised from from the foot thus completing the amputation of the forefoot. The resected bone was passed back to the table to be sent for surgical pathology. All rough edges of bone were smoothed using a power rasp. Final parabola was confirmed on C-arm. Hemostasis was noted throughout the wound bed. The wound was flushed with 3 Liters of normal sterile saline via pulse lavage. Culture swabs were obtained. Adequate flap close was noted with minimal skin tension. Attention was directed to the posterior achilles tendon. A stab incision was made centrally into the Achilles tendon approximately 2cm proximal to the Achilles tendon insertion. The medial 50% of the tendon was then transected. Another stab incision was made centrally into the Achilles tendon approximately 3cm proximal to the previous stab incision and the lateral 50% of the Achilles tendon was transected. Another stab incision was made centrally into the Achilles tendon approximately 3cm proximal to the previous stab incision and the medial 50% of the Achilles tendon was transected distal to the myotendinous junction. The ankle was then dorsiflexed until appropriate lengthening was obtained. The tendon was noted to be intact at the end of the lengthening procedure. Adequate dorsiflexion was noted at the ankle joint. The tourniquet was then deflated and immediate hyperemia returned. All bleeders were identified and ligated. Skin at the surgical site was then closed with 2-0 vicryl. Upon completion of the procedure, a total of 10 cc of 0.5% Marcaine was injected around the surgical sites. The incision sites were dressed with adaptic, 4x4s, kerlix, and ACE. A vaughn compression and posterior splint was applied to the patient. The patient tolerated the procedure and anesthesia well and was transferred to the PACU with all vital signs stable and vascular status intact to the left foot and ankle.  Following a period of postoperative monitoring, the patient will be admitted and transferred to the floor. Will follow up daily with patient. Patient will be non weightbearing to the left. Instructed to keep dressing clean, dry, and intact.         Leah Pineda DPM   Podiatric Medicine & Surgery   8/5/2020
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
## 2020-08-05 NOTE — PROGRESS NOTES
Patient transferred to room 1009 per bed from PACU, [place], [facility]. Patient oriented to room with call light and personal belongings within reach. LLE wrapped in ACE wrap, unable to assess surgical wound. Patient also complaining of pain, will medicate as soon as orders are verified.

## 2020-08-05 NOTE — PROGRESS NOTES
Patient non-compliant with NWB to LLE, patient refusing to use BSC and is insistent on going into bathroom. Will continue to educate.

## 2020-08-06 LAB
ABSOLUTE EOS #: 0.06 K/UL (ref 0–0.44)
ABSOLUTE IMMATURE GRANULOCYTE: 0.13 K/UL (ref 0–0.3)
ABSOLUTE LYMPH #: 2.86 K/UL (ref 1.1–3.7)
ABSOLUTE MONO #: 0.96 K/UL (ref 0.1–1.2)
ANION GAP SERPL CALCULATED.3IONS-SCNC: 11 MMOL/L (ref 9–17)
BASOPHILS # BLD: 1 % (ref 0–2)
BASOPHILS ABSOLUTE: 0.07 K/UL (ref 0–0.2)
BUN BLDV-MCNC: 21 MG/DL (ref 8–23)
BUN/CREAT BLD: 22 (ref 9–20)
CALCIUM SERPL-MCNC: 9 MG/DL (ref 8.6–10.4)
CHLORIDE BLD-SCNC: 100 MMOL/L (ref 98–107)
CO2: 27 MMOL/L (ref 20–31)
CREAT SERPL-MCNC: 0.96 MG/DL (ref 0.7–1.2)
DIFFERENTIAL TYPE: ABNORMAL
EOSINOPHILS RELATIVE PERCENT: 0 % (ref 1–4)
GFR AFRICAN AMERICAN: >60 ML/MIN
GFR NON-AFRICAN AMERICAN: >60 ML/MIN
GFR SERPL CREATININE-BSD FRML MDRD: ABNORMAL ML/MIN/{1.73_M2}
GFR SERPL CREATININE-BSD FRML MDRD: ABNORMAL ML/MIN/{1.73_M2}
GLUCOSE BLD-MCNC: 189 MG/DL (ref 75–110)
GLUCOSE BLD-MCNC: 190 MG/DL (ref 75–110)
GLUCOSE BLD-MCNC: 241 MG/DL (ref 75–110)
GLUCOSE BLD-MCNC: 249 MG/DL (ref 70–99)
GLUCOSE BLD-MCNC: 272 MG/DL (ref 75–110)
GLUCOSE BLD-MCNC: 299 MG/DL (ref 75–110)
GLUCOSE BLD-MCNC: 334 MG/DL (ref 75–110)
GLUCOSE BLD-MCNC: 385 MG/DL (ref 75–110)
HCT VFR BLD CALC: 33 % (ref 40.7–50.3)
HEMOGLOBIN: 10.1 G/DL (ref 13–17)
IMMATURE GRANULOCYTES: 1 %
LYMPHOCYTES # BLD: 19 % (ref 24–43)
MCH RBC QN AUTO: 29.3 PG (ref 25.2–33.5)
MCHC RBC AUTO-ENTMCNC: 30.6 G/DL (ref 28.4–34.8)
MCV RBC AUTO: 95.7 FL (ref 82.6–102.9)
MONOCYTES # BLD: 6 % (ref 3–12)
NRBC AUTOMATED: 0 PER 100 WBC
PDW BLD-RTO: 12.3 % (ref 11.8–14.4)
PLATELET # BLD: 543 K/UL (ref 138–453)
PLATELET ESTIMATE: ABNORMAL
PMV BLD AUTO: 9 FL (ref 8.1–13.5)
POTASSIUM SERPL-SCNC: 4 MMOL/L (ref 3.7–5.3)
RBC # BLD: 3.45 M/UL (ref 4.21–5.77)
RBC # BLD: ABNORMAL 10*6/UL
SEG NEUTROPHILS: 73 % (ref 36–65)
SEGMENTED NEUTROPHILS ABSOLUTE COUNT: 11.31 K/UL (ref 1.5–8.1)
SODIUM BLD-SCNC: 138 MMOL/L (ref 135–144)
SURGICAL PATHOLOGY REPORT: NORMAL
VANCOMYCIN TROUGH DATE LAST DOSE: ABNORMAL
VANCOMYCIN TROUGH DOSE AMOUNT: ABNORMAL
VANCOMYCIN TROUGH TIME LAST DOSE: ABNORMAL
VANCOMYCIN TROUGH: 25 UG/ML (ref 10–20)
WBC # BLD: 15.4 K/UL (ref 3.5–11.3)
WBC # BLD: ABNORMAL 10*3/UL

## 2020-08-06 PROCEDURE — 80202 ASSAY OF VANCOMYCIN: CPT

## 2020-08-06 PROCEDURE — 2580000003 HC RX 258: Performed by: INTERNAL MEDICINE

## 2020-08-06 PROCEDURE — 99232 SBSQ HOSP IP/OBS MODERATE 35: CPT | Performed by: INTERNAL MEDICINE

## 2020-08-06 PROCEDURE — 99221 1ST HOSP IP/OBS SF/LOW 40: CPT | Performed by: INTERNAL MEDICINE

## 2020-08-06 PROCEDURE — 6370000000 HC RX 637 (ALT 250 FOR IP): Performed by: NURSE PRACTITIONER

## 2020-08-06 PROCEDURE — 80048 BASIC METABOLIC PNL TOTAL CA: CPT

## 2020-08-06 PROCEDURE — 2580000003 HC RX 258: Performed by: STUDENT IN AN ORGANIZED HEALTH CARE EDUCATION/TRAINING PROGRAM

## 2020-08-06 PROCEDURE — 36415 COLL VENOUS BLD VENIPUNCTURE: CPT

## 2020-08-06 PROCEDURE — 6370000000 HC RX 637 (ALT 250 FOR IP): Performed by: STUDENT IN AN ORGANIZED HEALTH CARE EDUCATION/TRAINING PROGRAM

## 2020-08-06 PROCEDURE — 6370000000 HC RX 637 (ALT 250 FOR IP): Performed by: INTERNAL MEDICINE

## 2020-08-06 PROCEDURE — 82947 ASSAY GLUCOSE BLOOD QUANT: CPT

## 2020-08-06 PROCEDURE — 85025 COMPLETE CBC W/AUTO DIFF WBC: CPT

## 2020-08-06 PROCEDURE — 6360000002 HC RX W HCPCS: Performed by: STUDENT IN AN ORGANIZED HEALTH CARE EDUCATION/TRAINING PROGRAM

## 2020-08-06 PROCEDURE — 6360000002 HC RX W HCPCS: Performed by: INTERNAL MEDICINE

## 2020-08-06 PROCEDURE — 97116 GAIT TRAINING THERAPY: CPT

## 2020-08-06 PROCEDURE — 1200000000 HC SEMI PRIVATE

## 2020-08-06 RX ORDER — CALCIUM CARBONATE 200(500)MG
500 TABLET,CHEWABLE ORAL 3 TIMES DAILY PRN
Status: DISCONTINUED | OUTPATIENT
Start: 2020-08-06 | End: 2020-08-07 | Stop reason: HOSPADM

## 2020-08-06 RX ORDER — FLUTICASONE PROPIONATE 50 MCG
1 SPRAY, SUSPENSION (ML) NASAL DAILY
Status: DISCONTINUED | OUTPATIENT
Start: 2020-08-06 | End: 2020-08-07 | Stop reason: HOSPADM

## 2020-08-06 RX ORDER — FAMOTIDINE 20 MG/1
20 TABLET, FILM COATED ORAL 2 TIMES DAILY
Status: DISCONTINUED | OUTPATIENT
Start: 2020-08-06 | End: 2020-08-07 | Stop reason: HOSPADM

## 2020-08-06 RX ORDER — FAMOTIDINE 20 MG/1
20 TABLET, FILM COATED ORAL 2 TIMES DAILY
Qty: 60 TABLET | Refills: 3 | Status: SHIPPED | OUTPATIENT
Start: 2020-08-06 | End: 2020-09-05 | Stop reason: SDUPTHER

## 2020-08-06 RX ORDER — ASPIRIN 81 MG/1
81 TABLET, CHEWABLE ORAL DAILY
Qty: 30 TABLET | Refills: 3 | Status: SHIPPED | OUTPATIENT
Start: 2020-08-07 | End: 2020-09-05 | Stop reason: SDUPTHER

## 2020-08-06 RX ORDER — HYDROCODONE BITARTRATE AND ACETAMINOPHEN 5; 325 MG/1; MG/1
1 TABLET ORAL EVERY 6 HOURS PRN
Qty: 28 TABLET | Refills: 0 | Status: SHIPPED | OUTPATIENT
Start: 2020-08-06 | End: 2020-08-13

## 2020-08-06 RX ADMIN — MORPHINE SULFATE 2 MG: 2 INJECTION, SOLUTION INTRAMUSCULAR; INTRAVENOUS at 03:08

## 2020-08-06 RX ADMIN — FAMOTIDINE 20 MG: 20 TABLET, FILM COATED ORAL at 08:09

## 2020-08-06 RX ADMIN — FAMOTIDINE 20 MG: 20 TABLET, FILM COATED ORAL at 22:21

## 2020-08-06 RX ADMIN — MORPHINE SULFATE 4 MG: 4 INJECTION, SOLUTION INTRAMUSCULAR; INTRAVENOUS at 15:26

## 2020-08-06 RX ADMIN — FLUTICASONE PROPIONATE 1 SPRAY: 50 SPRAY, METERED NASAL at 08:10

## 2020-08-06 RX ADMIN — SODIUM CHLORIDE 3 G: 900 INJECTION INTRAVENOUS at 17:35

## 2020-08-06 RX ADMIN — MORPHINE SULFATE 4 MG: 4 INJECTION, SOLUTION INTRAMUSCULAR; INTRAVENOUS at 18:35

## 2020-08-06 RX ADMIN — MORPHINE SULFATE 2 MG: 2 INJECTION, SOLUTION INTRAMUSCULAR; INTRAVENOUS at 22:25

## 2020-08-06 RX ADMIN — INSULIN LISPRO 4 UNITS: 100 INJECTION, SOLUTION INTRAVENOUS; SUBCUTANEOUS at 02:09

## 2020-08-06 RX ADMIN — SODIUM CHLORIDE 3 G: 900 INJECTION INTRAVENOUS at 12:23

## 2020-08-06 RX ADMIN — SODIUM CHLORIDE, PRESERVATIVE FREE 10 ML: 5 INJECTION INTRAVENOUS at 07:38

## 2020-08-06 RX ADMIN — INSULIN LISPRO 1 UNITS: 100 INJECTION, SOLUTION INTRAVENOUS; SUBCUTANEOUS at 17:34

## 2020-08-06 RX ADMIN — OXYCODONE HYDROCHLORIDE AND ACETAMINOPHEN 2 TABLET: 5; 325 TABLET ORAL at 12:23

## 2020-08-06 RX ADMIN — INSULIN LISPRO 3 UNITS: 100 INJECTION, SOLUTION INTRAVENOUS; SUBCUTANEOUS at 04:03

## 2020-08-06 RX ADMIN — SODIUM CHLORIDE, PRESERVATIVE FREE 10 ML: 5 INJECTION INTRAVENOUS at 22:27

## 2020-08-06 RX ADMIN — VANCOMYCIN HYDROCHLORIDE 1250 MG: 1.25 INJECTION, POWDER, LYOPHILIZED, FOR SOLUTION INTRAVENOUS at 08:10

## 2020-08-06 RX ADMIN — APIXABAN 5 MG: 5 TABLET, FILM COATED ORAL at 22:21

## 2020-08-06 RX ADMIN — ASPIRIN 81 MG 81 MG: 81 TABLET ORAL at 08:09

## 2020-08-06 RX ADMIN — OXYCODONE HYDROCHLORIDE AND ACETAMINOPHEN 2 TABLET: 5; 325 TABLET ORAL at 17:35

## 2020-08-06 RX ADMIN — OXYCODONE HYDROCHLORIDE AND ACETAMINOPHEN 2 TABLET: 5; 325 TABLET ORAL at 01:29

## 2020-08-06 RX ADMIN — APIXABAN 5 MG: 5 TABLET, FILM COATED ORAL at 08:09

## 2020-08-06 RX ADMIN — MORPHINE SULFATE 4 MG: 4 INJECTION, SOLUTION INTRAMUSCULAR; INTRAVENOUS at 07:31

## 2020-08-06 RX ADMIN — Medication 500 MG: at 03:08

## 2020-08-06 RX ADMIN — INSULIN LISPRO 3 UNITS: 100 INJECTION, SOLUTION INTRAVENOUS; SUBCUTANEOUS at 12:22

## 2020-08-06 RX ADMIN — Medication 500 MG: at 14:10

## 2020-08-06 RX ADMIN — Medication 500 MG: at 07:32

## 2020-08-06 RX ADMIN — MORPHINE SULFATE 4 MG: 4 INJECTION, SOLUTION INTRAMUSCULAR; INTRAVENOUS at 11:21

## 2020-08-06 RX ADMIN — OXYCODONE HYDROCHLORIDE AND ACETAMINOPHEN 2 TABLET: 5; 325 TABLET ORAL at 08:09

## 2020-08-06 RX ADMIN — OXYCODONE HYDROCHLORIDE AND ACETAMINOPHEN 2 TABLET: 5; 325 TABLET ORAL at 05:37

## 2020-08-06 RX ADMIN — INSULIN LISPRO 5 UNITS: 100 INJECTION, SOLUTION INTRAVENOUS; SUBCUTANEOUS at 22:28

## 2020-08-06 RX ADMIN — INSULIN LISPRO 6 UNITS: 100 INJECTION, SOLUTION INTRAVENOUS; SUBCUTANEOUS at 08:10

## 2020-08-06 RX ADMIN — ATORVASTATIN CALCIUM 40 MG: 40 TABLET, FILM COATED ORAL at 08:09

## 2020-08-06 ASSESSMENT — PAIN SCALES - GENERAL
PAINLEVEL_OUTOF10: 5
PAINLEVEL_OUTOF10: 8
PAINLEVEL_OUTOF10: 8
PAINLEVEL_OUTOF10: 9
PAINLEVEL_OUTOF10: 5
PAINLEVEL_OUTOF10: 8
PAINLEVEL_OUTOF10: 5
PAINLEVEL_OUTOF10: 7
PAINLEVEL_OUTOF10: 9
PAINLEVEL_OUTOF10: 6
PAINLEVEL_OUTOF10: 7
PAINLEVEL_OUTOF10: 7
PAINLEVEL_OUTOF10: 9
PAINLEVEL_OUTOF10: 6
PAINLEVEL_OUTOF10: 5
PAINLEVEL_OUTOF10: 7
PAINLEVEL_OUTOF10: 8
PAINLEVEL_OUTOF10: 7
PAINLEVEL_OUTOF10: 9
PAINLEVEL_OUTOF10: 10
PAINLEVEL_OUTOF10: 7
PAINLEVEL_OUTOF10: 8

## 2020-08-06 ASSESSMENT — PAIN DESCRIPTION - LOCATION
LOCATION: LEG

## 2020-08-06 ASSESSMENT — PAIN DESCRIPTION - ORIENTATION
ORIENTATION: LEFT

## 2020-08-06 ASSESSMENT — ENCOUNTER SYMPTOMS
SHORTNESS OF BREATH: 0
ABDOMINAL PAIN: 0
COLOR CHANGE: 0
VOMITING: 0
NAUSEA: 0
CONSTIPATION: 0
DIARRHEA: 0

## 2020-08-06 ASSESSMENT — PAIN DESCRIPTION - PAIN TYPE
TYPE: ACUTE PAIN;SURGICAL PAIN
TYPE: CHRONIC PAIN
TYPE: ACUTE PAIN;SURGICAL PAIN

## 2020-08-06 ASSESSMENT — PAIN DESCRIPTION - FREQUENCY
FREQUENCY: CONTINUOUS

## 2020-08-06 ASSESSMENT — PAIN DESCRIPTION - DESCRIPTORS
DESCRIPTORS: ACHING

## 2020-08-06 ASSESSMENT — PAIN DESCRIPTION - ONSET
ONSET: ON-GOING

## 2020-08-06 NOTE — PROGRESS NOTES
Physical Therapy   DATE: 2020    NAME: Jeny Ahmadi  MRN: [id number]   : 1955    Patient not seen this date for Physical Therapy due to:  [] Blood transfusion in progress  [] Hemodialysis  [x]  Patient Declined    Refuses to put on his prosthesis and get out of bed until nurse has brought him his coffee and he has drank it.  [] Spine Precautions   [] Strict Bedrest  [] Surgery/ Procedure  [] Testing      [] Other        [] PT being discontinued at this time. Patient independent. No further needs. [] PT being discontinued at this time as the patient has been transferred to palliative care. No further needs.     Adrián Oneill, PT

## 2020-08-06 NOTE — PROGRESS NOTES
Occupational [facility]  Occupational Therapy Not Seen Note    Patient not available for Occupational Therapy due to:    [] Testing:    [] Hemodialysis    [] Cancelled by RN:    []Refusal by Patient:    [] Surgery:     [] Intubation:     [] Pain Medication:    [] Sedation:     [] Spine Precautions :    [] Medical Instability:    [x] Other: Pt refusing to get EOB/OOB.  Will continue to check on pt for OT geno Sarabia OTVIGNESH/MARIN
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
## 2020-08-06 NOTE — PLAN OF CARE
Problem: Falls - Risk of:  Goal: Will remain free from falls  Description: Will remain free from falls  8/6/2020 1138 by Ruth Ann Gaines RN  Outcome: Ongoing  Siderails up x 2  Hourly rounding. Call light in reach. Instructed to call for assist before attempting out of bed. Remains free from falls and accidental injury at this time. Floor free from obstacles, and bed is locked and in lowest position. Adequate lighting provided. Bed alarm on. Fall sticker on wristband.  Fall Sign posted in doorway       Problem: Falls - Risk of:  Goal: Absence of physical injury  Description: Absence of physical injury  8/6/2020 1138 by Ruth Ann Gaines RN  Outcome: Ongoing     Problem: Pain:  Goal: Pain level will decrease  Description: Pain level will decrease  8/6/2020 1138 by Ruth Ann Gaines RN  Outcome: Ongoing     Problem: Pain:  Goal: Control of acute pain  Description: Control of acute pain  8/6/2020 1138 by Ruth Ann Gaines RN  Outcome: Ongoing     Problem: Pain:  Goal: Control of chronic pain  Description: Control of chronic pain  8/6/2020 1138 by Ruth Ann Gaines RN  Outcome: Ongoing

## 2020-08-06 NOTE — PROGRESS NOTES
General  Chart Reviewed: Yes  Family / Caregiver Present: No  Pain Screening  Patient Currently in Pain: Denies  Vital Signs  Patient Currently in Pain: Denies            Cognition   Cognition  Following Commands: Does not follow commands  Problem Solving: Assistance required to generate solutions;Assistance required to correct errors made;Assistance required to implement solutions  Insights: Decreased awareness of deficits  Cognition Comment: documented noncompliance. Objective   Bed mobility  Supine to Sit: Modified independent  Sit to Supine: Modified independent  Transfers  Sit to Stand: Stand by assistanceHe put weight on left foot during sit to stand and then picked the foot up and denied that he put any weight on it. Educated in not putting weight on his foot at all, even during sit to stand. Stand to sit: Stand by assistance  Ambulation  Ambulation?: Yes  Ambulation 1  Surface: level tile  Device: Rolling Walker  Assistance: Contact guard assistance;Minimal assistance  Distance: 3' along edge of bed x4. Min A to control his unsteadiness on first down and back. Requested he move more slowly to control his balance on his own. 2nd down and back CGA     Balance  Standing - Static: Fair;-  Standing - Dynamic: Poor;+                                                               AM-PAC Score  AM-PAC Inpatient Mobility Raw Score : 17 (08/06/20 1633)  AM-PAC Inpatient T-Scale Score : 42.13 (08/06/20 1633)  Mobility Inpatient CMS 0-100% Score: 50.57 (08/06/20 1633)  Mobility Inpatient CMS G-Code Modifier : CK (08/06/20 1633)          Goals  Short term goals  Time Frame for Short term goals: 10 visits  Short term goal 1: Patient will complete 20 reps R hip and knee movement. Short term goal 2: Patient will complete 20 reps L hip and knee movement. Short term goal 3: STG 3  Ambulate 5' with RW with SBA NWB LLE and right BK prosthesis with an appropriate speed to control his losses of balance.   Patient Goals Patient goals : Have a bowel movement    Plan    Plan  Times per week: 1x/day,  5-6days/wk  Current Treatment Recommendations: Strengthening, Home Exercise Program, Balance Training, Endurance Training, Functional Mobility Training, Transfer Training, Safety Education & Training, Gait Training  Safety Devices  Type of devices: Left in bed, Call light within reach, Gait belt     Therapy Time   Individual Concurrent Group Co-treatment   Time In 1445         Time Out 1455         Minutes 10         Timed Code Treatment Minutes: 10 Minutes       Sherlyn Rae, PT

## 2020-08-06 NOTE — DISCHARGE INSTR - COC
Continuity of Care Form    Patient Name: Devante Valdez   :  1955  MRN:  [id number]    Admit date:  2020  Discharge date:  2020    Code Status Order: Full Code   Advance Directives:   Advance Care Flowsheet Documentation     Date/Time Healthcare Directive Type of Healthcare Directive Copy in [address] Agent's Name Healthcare Agent's Phone Number    20 1339  No, patient does not have an advance directive for healthcare treatment  --  --  --  --  --    20 0616  No, patient does not have an advance directive for healthcare treatment  --  --  --  --  --          Admitting Physician:  Leela Harris DPM  PCP: Jyotsna Corona DO    Discharging Nurse:  Maru Taylor Unit/Room#: 2224/3152-01  Discharging Unit Phone Number: [phone]    Emergency Contact:   Extended Emergency Contact Information  Primary Emergency Contact: Mick Cantu  Address: [place] Phone: [phone]  Relation: Parent  Secondary Emergency Contact: [address] Phone: [phone]  Mobile Phone: [phone]  Relation: Child    Past Surgical History:  Past Surgical History:   Procedure Laterality Date    COLONOSCOPY  2015    hyperplastic polyp    COLONOSCOPY  2017    ESOPHAGECTOMY      cancer    FOOT SURGERY Right 2016    I & D heel    FOOT SURGERY Right 2016    I & D    FRACTURE SURGERY Left 2015    humerus left, left leg    LEG AMPUTATION BELOW KNEE Right 2017    TOE AMPUTATION Right     rt 3rd through 5th digits    TOE AMPUTATION Left 2016    left foot 5th toe    TOE AMPUTATION Left 2020    FOOT TRANSMETATARSAL  AMPUTATION - AND LEFT PECUTANEOUS TENDO ACHILLES LENGTHENING performed by Leela Harris DPM at [address]      13- with dilation    VASCULAR SURGERY Right 2017    foot guillotine amputation Immunization History:   Immunization History   Administered Date(s) Administered    Influenza Vaccine, unspecified formulation 10/10/2016    Influenza Virus Vaccine 11/03/2014, 10/29/2015    Influenza, [place], IM, (6 mo and older Fluzone, Flulaval, Fluarix and 3 yrs and older Afluria) 10/10/2016    Pneumococcal Polysaccharide ([id number]) 09/05/2012, 10/29/2015    Tdap (Boostrix, Adacel) 07/01/2019       Active Problems:  Patient Active Problem List   Diagnosis Code    Type 2 diabetes mellitus with diabetic polyneuropathy, with long-term current use of insulin ([facility]) E11.42, Z79.4    Neuropathic pain, leg M79.2    Diabetic polyneuropathy ([facility] E11.42    Allergic rhinitis J30.9    Tobacco dependence F17.200    COPD (chronic obstructive pulmonary disease) ([facility]) J44.9    Edentulous K00.0    Dysphagia R13.10    Lung nodules R91.8    Esophageal cancer ([facility]) C15.9    Fracture of humerus, left, closed S42.302A    Closed fracture of humerus S42.309A    DM type 2 with diabetic peripheral neuropathy ([facility]) E11.42    Chronic obstructive pulmonary disease ([facility]) J44.9    Dyslipidemia E78.5    Gastroesophageal reflux disease K21.9    Chronic pain syndrome G89.4    Marijuana use F12.90    History of colon polyps Z86.010    Cellulitis of right heel L03.115    PVD (peripheral vascular disease) ([facility]) I73.9    Functional diarrhea K59.1    Sepsis due to methicillin resistant Staphylococcus aureus (MRSA) ([facility]) A41.02    History of esophageal cancer Z85.01    Osteomyelitis, chronic, ankle or foot ([facility]) M86.679    Peripheral artery disease ([facility]) I73.9    Other pulmonary embolism without acute cor pulmonale ([facility]) I26.99    Carotid stenosis, asymptomatic, bilateral I65.23    Lower limb amputation status Z89.619    Fx humeral neck, right, closed, initial encounter S42.211A    Transient hypotension I95.9    Constipation due to opioid therapy K59.03, T40.2X5A    Acute kidney injury ([facility] N17.9    Diabetic ulcer of left midfoot associated with type 2 diabetes mellitus, with fat layer exposed ([facility] E11.621, Y70.871    Complication of below knee amputation stump ([facility]) T87.9    COPD exacerbation ([facility]) J44.1    Hyponatremia E87.1    Pain, phantom limb ([facility]) G54.6    Below-knee amputation of right lower extremity ([facility]) C31.784B    Hyperglycemia R73.9    Moderate protein malnutrition ([facility]) E44.0    Essential hypertension I10    Uncontrolled type 2 diabetes mellitus with hyperglycemia ([facility] E11.65    History of pulmonary embolism - 2017 Z86. 80    Atelectasis J98.11    Uncontrolled type 2 diabetes mellitus with foot ulcer ([facility] E11.621, L97.509, E11.65    Azotemia R79.89    Anemia, normocytic normochromic D64.9    Osteomyelitis of fourth phalange of left foot ([facility]) M86.9    Drug-induced constipation K59.03    Osteomyelitis ([facility]) M86.9       Isolation/Infection:   Isolation          No Isolation        Patient Infection Status     Infection Onset Added Last Indicated Last Indicated By Review Planned Expiration Resolved Resolved By    None active    Resolved    COVID-19 Rule Out 08/01/20 08/01/20 08/01/20 COVID-19 (Ordered)   08/02/20 Mike Rogers RN    Test discontinued - negative result this admission    MRSA  01/02/17 01/02/17 Mike Rogers RN   07/31/20 Mike Rogers RN    2 negative nasal screen - 2020  Foot - 6/2018          Nurse Assessment:  Last Vital Signs: BP (!) 149/70   Pulse 68   Temp 97.3 °F (36.3 °C) (Oral)   Resp 18   Ht 6' (1.829 m)   Wt 170 lb (77.1 kg)   SpO2 97%   BMI 23.06 kg/m²     Last documented pain score (0-10 scale): Pain Level: 10  Last Weight:   Wt Readings from Last 1 Encounters:   08/05/20 170 lb (77.1 kg)     Mental Status:  oriented and alert    IV Access:  - None    Nursing Mobility/ADLs:  Walking   Assisted  Transfer  Assisted  Bathing  Independent  Dressing  Independent  Toileting  Independent  Feeding  [address]  Independent  Med Delivery whole    Wound Care Documentation and Therapy:  Puncture 07/29/20 Groin Right (Active)   Wound Assessment Other (Comment) 08/03/20 0400   Odalys-wound Assessment Clean;Dry;Excoriated; Intact 08/03/20 0400   Closure Other (Comment) 08/03/20 0400   Drainage Amount None 08/03/20 0400   Dressing/Treatment Other (comment) 08/03/20 0400   Dressing Changed Changed/New 07/31/20 0048   Dressing Status Clean;Dry; Intact 08/03/20 0400   Dressing Change Due 07/31/20 07/31/20 0048   Number of days: 8       Wound 02/24/20 Knee Anterior; Left scabs x2 X1\" round (Active)   Number of days: 164       Wound 07/28/20 Foot Left; Other (Comment) (Active)   Wound Diabetic 08/03/20 0400   Offloading for Diabetic Foot Ulcers Offloading boot 07/31/20 1344   Dressing Status Clean;Dry; Intact 08/03/20 0400   Dressing Changed Changed/New 08/02/20 1245   Dressing/Treatment Roll gauze;Dry dressing 08/03/20 0400   Wound Cleansed Wound cleanser 08/01/20 0800   Dressing Change Due 08/01/20 07/31/20 1815   Wound Assessment Other (Comment) 08/03/20 0400   Drainage Amount Small 08/01/20 0800   Odor Strong 08/03/20 0400   Margins Unattached edges; Undefined edges 07/31/20 1815   Odalys-wound Assessment Other (Comment) 08/01/20 0449   Culture Taken No 08/01/20 0800   Number of days: 9       Wound 07/29/20 Knee Right large round abrasion to right stump  (Active)   Wound Other 08/01/20 2230   Dressing Status Clean;Dry; Intact 08/02/20 0412   Dressing Changed Changed/New 08/01/20 2230   Dressing/Treatment Other (comment) 08/03/20 0400   Dressing Change Due 08/01/20 08/01/20 0449   Wound Assessment Other (Comment) 08/03/20 0400   Drainage Amount None 08/03/20 0400   Odor None 08/02/20 0412   Odalys-wound Assessment Other (Comment) 08/03/20 0400   Number of days: 8        Elimination:  Continence:   · Bowel:  Yes  · Bladder: Yes  Urinary Catheter: None   Colostomy/Ileostomy/Ileal Conduit: No       Date of Last BM: ***    Intake/Output Summary (Last 24 hours) at 8/6/2020 [address] [place] [address] filed at 8/6/2020 0344  Gross per 24 hour   Intake 740 ml   Output 1500 ml   Net -760 ml     I/O last 3 completed shifts: In: 1410 [P.O.:600; I.V.:560; IV Piggyback:250]  Out: 9707 [Urine:1500; Blood:10]    Safety Concerns: At Risk for Falls    Impairments/Disabilities:      Amputation - right BKA     Nutrition Therapy:  Current Nutrition Therapy:   - Oral Diet:  Carb Control 5 carbs/meal (2000kcals/day)    Routes of Feeding: Oral  Liquids: No Restrictions  Daily Fluid Restriction: no  Last Modified Barium Swallow with Video (Video Swallowing Test): not done    Treatments at the Time of Hospital Discharge:   Respiratory Treatments: na   Oxygen Therapy:  is not on home oxygen therapy. Ventilator:    - No ventilator support    Rehab Therapies: Physical Therapy and Occupational Therapy  Weight Bearing Status/Restrictions: Non-weight bearing on left leg  Other Medical Equipment (for information only, NOT a DME order):  wheelchair and walker  Other Treatments:   1. Skilled RN assessment   2. Wheelchair ordered thru [facility] . Please call to arrange delivery before discharge from facility   3. Dressing to Left Lower Extremity to remain in place until patient follows up with Podiatry  in 2 weeks      Patient's personal belongings (please select all that are sent with patient):  {[facility] DME Belongings:[id number]}    RN SIGNATURE:  {Esignature:[id number]}    CASE MANAGEMENT/SOCIAL WORK SECTION    Inpatient Status Date: 8/5/20    Readmission Risk Assessment Score:  Readmission Risk              Risk of Unplanned Readmission:        54           Discharging to Facility/ Agency   Name: Name: [facility]  Address: [address] [place] [address] [place]   Phone: [phone]  · Fax: [phone]    Dialysis Facility (if applicable)   · Name:  · Address:  · Dialysis Schedule:  · Phone:  · Fax:    / signature: Electronically signed by Amarilis Luciano RN on 8/6/20 at 12:52 PM EDT    PHYSICIAN SECTION    Prognosis: Good    Condition at Discharge: Stable    Rehab Potential (if transferring to Rehab): Good    Recommended Labs or Other Treatments After Discharge: strict non-weight bearing to left lower extremity; PT/OT needs; please keep dressing CDI until f/u with Dr. Jerry Iniguez. She will give him wound care orders following that visit. Physician Certification: I certify the above information and transfer of Kimberly Jorgensen  is necessary for the continuing treatment of the diagnosis listed and that he requires [address] for less 30 days.      Update Admission H&P: No change in H&P    PHYSICIAN SIGNATURE: Daniela President, DPM

## 2020-08-06 NOTE — CARE COORDINATION
SW received call from Charmayne Clark with [facility] regarding the pt has been denied due to having an outstanding bill at this facility.    SW met with pt to inform of denial by [facility], pt is agreeable to referral to [facility], SW will fax referral

## 2020-08-06 NOTE — PROGRESS NOTES
[facility] called to check on a few things for the patient     The dressing will remain in place until he follows up in 2 weeks with Dr. Blade Blum.  It is a sterile dressing     He will be going on oral doxycycline 100 mg twice daily and Augmentin 875 mg twice daily for 1 week on discharge
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
## 2020-08-06 NOTE — PROGRESS NOTES
Anna Hooks requires crutches due to impaired ambulation and for increased stability in order to participate in or complete daily living tasks of: personal cares and ambulating. The patient is able to safely use the crutches and the functional mobility deficit can be sufficiently resolved.       Janna Lord DPM   Podiatric Medicine & Surgery   8/6/2020 at 5:11 PM

## 2020-08-06 NOTE — CARE COORDINATION
Discharge planning     Podiatry up to see patient and discussed plan of care. Patient is anticipated as potential dc today . Met with him and pt rec snf yesterday but refused to work with today ( he stated was in pain )     Did discuss therapy recommendations and home care vs snf. He is interested in snf. Explained will have therapy come this afternoon to see him to see how he does. If they continue to recommend snf he would like to have a referral \"near here\" . Updated on local nursing homes and [facility] ratings. He would like to have [facility]    Updated SS of the above. Per ID they will send home on po atb. Per podiatry they have ordered a w/c. Did fax to [facility] as he is [employer]      POD   · ID following will d/c with oral abx  · Patient is status post left transmetatarsal amputation and tendo Achilles lengthening. Patient will require placement and snf or home health upon discharge.     · Patient to remain nonweightbearing to the left lower extremity  · Patient is ok to d/c from podiatry standpoint pending final rec's per other services and case management   DVT ppx: eliquis  Diet: carb control   Activity: NWB to Left lower extremity  Pain Control: panel ordered
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
## 2020-08-07 VITALS
SYSTOLIC BLOOD PRESSURE: 137 MMHG | WEIGHT: 170 LBS | BODY MASS INDEX: 23.03 KG/M2 | TEMPERATURE: 97.9 F | OXYGEN SATURATION: 95 % | HEIGHT: 72 IN | HEART RATE: 76 BPM | RESPIRATION RATE: 20 BRPM | DIASTOLIC BLOOD PRESSURE: 63 MMHG

## 2020-08-07 LAB
BUN BLDV-MCNC: 26 MG/DL (ref 8–23)
CREAT SERPL-MCNC: 0.95 MG/DL (ref 0.7–1.2)
GFR AFRICAN AMERICAN: >60 ML/MIN
GFR NON-AFRICAN AMERICAN: >60 ML/MIN
GFR SERPL CREATININE-BSD FRML MDRD: ABNORMAL ML/MIN/{1.73_M2}
GFR SERPL CREATININE-BSD FRML MDRD: ABNORMAL ML/MIN/{1.73_M2}
GLUCOSE BLD-MCNC: 139 MG/DL (ref 75–110)
GLUCOSE BLD-MCNC: 263 MG/DL (ref 75–110)
GLUCOSE BLD-MCNC: 359 MG/DL (ref 75–110)
VANCOMYCIN TROUGH DATE LAST DOSE: NORMAL
VANCOMYCIN TROUGH DOSE AMOUNT: NORMAL
VANCOMYCIN TROUGH TIME LAST DOSE: NORMAL
VANCOMYCIN TROUGH: 12.8 UG/ML (ref 10–20)

## 2020-08-07 PROCEDURE — 97166 OT EVAL MOD COMPLEX 45 MIN: CPT

## 2020-08-07 PROCEDURE — 82565 ASSAY OF CREATININE: CPT

## 2020-08-07 PROCEDURE — 97116 GAIT TRAINING THERAPY: CPT

## 2020-08-07 PROCEDURE — 6360000002 HC RX W HCPCS: Performed by: STUDENT IN AN ORGANIZED HEALTH CARE EDUCATION/TRAINING PROGRAM

## 2020-08-07 PROCEDURE — 84520 ASSAY OF UREA NITROGEN: CPT

## 2020-08-07 PROCEDURE — 2580000003 HC RX 258: Performed by: STUDENT IN AN ORGANIZED HEALTH CARE EDUCATION/TRAINING PROGRAM

## 2020-08-07 PROCEDURE — 6370000000 HC RX 637 (ALT 250 FOR IP): Performed by: NURSE PRACTITIONER

## 2020-08-07 PROCEDURE — 99232 SBSQ HOSP IP/OBS MODERATE 35: CPT | Performed by: INTERNAL MEDICINE

## 2020-08-07 PROCEDURE — 6370000000 HC RX 637 (ALT 250 FOR IP): Performed by: STUDENT IN AN ORGANIZED HEALTH CARE EDUCATION/TRAINING PROGRAM

## 2020-08-07 PROCEDURE — 2580000003 HC RX 258: Performed by: INTERNAL MEDICINE

## 2020-08-07 PROCEDURE — 36415 COLL VENOUS BLD VENIPUNCTURE: CPT

## 2020-08-07 PROCEDURE — 80202 ASSAY OF VANCOMYCIN: CPT

## 2020-08-07 PROCEDURE — 97535 SELF CARE MNGMENT TRAINING: CPT

## 2020-08-07 PROCEDURE — 6360000002 HC RX W HCPCS: Performed by: INTERNAL MEDICINE

## 2020-08-07 PROCEDURE — 6370000000 HC RX 637 (ALT 250 FOR IP): Performed by: INTERNAL MEDICINE

## 2020-08-07 PROCEDURE — 82947 ASSAY GLUCOSE BLOOD QUANT: CPT

## 2020-08-07 RX ORDER — AMOXICILLIN AND CLAVULANATE POTASSIUM 875; 125 MG/1; MG/1
1 TABLET, FILM COATED ORAL 2 TIMES DAILY
Qty: 20 TABLET | Refills: 0 | Status: SHIPPED | OUTPATIENT
Start: 2020-08-07 | End: 2020-08-17

## 2020-08-07 RX ORDER — MAGNESIUM HYDROXIDE/ALUMINUM HYDROXICE/SIMETHICONE 120; 1200; 1200 MG/30ML; MG/30ML; MG/30ML
30 SUSPENSION ORAL EVERY 6 HOURS PRN
Status: DISCONTINUED | OUTPATIENT
Start: 2020-08-07 | End: 2020-08-07 | Stop reason: HOSPADM

## 2020-08-07 RX ORDER — DOXYCYCLINE HYCLATE 100 MG
100 TABLET ORAL 2 TIMES DAILY
Qty: 14 TABLET | Refills: 0 | Status: SHIPPED | OUTPATIENT
Start: 2020-08-07 | End: 2020-08-14

## 2020-08-07 RX ADMIN — OXYCODONE HYDROCHLORIDE AND ACETAMINOPHEN 2 TABLET: 5; 325 TABLET ORAL at 03:50

## 2020-08-07 RX ADMIN — MORPHINE SULFATE 4 MG: 4 INJECTION, SOLUTION INTRAMUSCULAR; INTRAVENOUS at 05:53

## 2020-08-07 RX ADMIN — Medication 500 MG: at 00:05

## 2020-08-07 RX ADMIN — OXYCODONE HYDROCHLORIDE AND ACETAMINOPHEN 2 TABLET: 5; 325 TABLET ORAL at 08:45

## 2020-08-07 RX ADMIN — SODIUM CHLORIDE, PRESERVATIVE FREE 10 ML: 5 INJECTION INTRAVENOUS at 08:50

## 2020-08-07 RX ADMIN — INSULIN LISPRO 9 UNITS: 100 INJECTION, SOLUTION INTRAVENOUS; SUBCUTANEOUS at 08:45

## 2020-08-07 RX ADMIN — SODIUM CHLORIDE 3 G: 900 INJECTION INTRAVENOUS at 05:03

## 2020-08-07 RX ADMIN — MORPHINE SULFATE 4 MG: 4 INJECTION, SOLUTION INTRAMUSCULAR; INTRAVENOUS at 11:27

## 2020-08-07 RX ADMIN — SODIUM CHLORIDE 3 G: 900 INJECTION INTRAVENOUS at 12:50

## 2020-08-07 RX ADMIN — ATORVASTATIN CALCIUM 40 MG: 40 TABLET, FILM COATED ORAL at 08:45

## 2020-08-07 RX ADMIN — OXYCODONE HYDROCHLORIDE AND ACETAMINOPHEN 2 TABLET: 5; 325 TABLET ORAL at 15:53

## 2020-08-07 RX ADMIN — FAMOTIDINE 20 MG: 20 TABLET, FILM COATED ORAL at 08:44

## 2020-08-07 RX ADMIN — VANCOMYCIN HYDROCHLORIDE 1500 MG: 1.5 INJECTION, POWDER, LYOPHILIZED, FOR SOLUTION INTRAVENOUS at 08:55

## 2020-08-07 RX ADMIN — INSULIN LISPRO 15 UNITS: 100 INJECTION, SOLUTION INTRAVENOUS; SUBCUTANEOUS at 17:42

## 2020-08-07 RX ADMIN — SODIUM CHLORIDE 3 G: 900 INJECTION INTRAVENOUS at 00:05

## 2020-08-07 RX ADMIN — ASPIRIN 81 MG 81 MG: 81 TABLET ORAL at 08:45

## 2020-08-07 RX ADMIN — FLUTICASONE PROPIONATE 1 SPRAY: 50 SPRAY, METERED NASAL at 08:50

## 2020-08-07 RX ADMIN — POLYETHYLENE GLYCOL 3350 17 G: 17 POWDER, FOR SOLUTION ORAL at 14:17

## 2020-08-07 RX ADMIN — ALUMINUM HYDROXIDE, MAGNESIUM HYDROXIDE, AND SIMETHICONE 30 ML: 200; 200; 20 SUSPENSION ORAL at 11:27

## 2020-08-07 RX ADMIN — APIXABAN 5 MG: 5 TABLET, FILM COATED ORAL at 08:45

## 2020-08-07 ASSESSMENT — ENCOUNTER SYMPTOMS
DIARRHEA: 0
COLOR CHANGE: 0
VOMITING: 0
NAUSEA: 0
ABDOMINAL PAIN: 0
SHORTNESS OF BREATH: 0
CONSTIPATION: 0

## 2020-08-07 ASSESSMENT — PAIN SCALES - GENERAL
PAINLEVEL_OUTOF10: 7
PAINLEVEL_OUTOF10: 8
PAINLEVEL_OUTOF10: 0
PAINLEVEL_OUTOF10: 0
PAINLEVEL_OUTOF10: 7
PAINLEVEL_OUTOF10: 8
PAINLEVEL_OUTOF10: 0
PAINLEVEL_OUTOF10: 8
PAINLEVEL_OUTOF10: 8
PAINLEVEL_OUTOF10: 0
PAINLEVEL_OUTOF10: 5
PAINLEVEL_OUTOF10: 8
PAINLEVEL_OUTOF10: 7

## 2020-08-07 ASSESSMENT — PAIN DESCRIPTION - ORIENTATION: ORIENTATION: LEFT

## 2020-08-07 ASSESSMENT — PAIN DESCRIPTION - PAIN TYPE: TYPE: SURGICAL PAIN

## 2020-08-07 ASSESSMENT — PAIN DESCRIPTION - LOCATION: LOCATION: FOOT;LEG
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
## 2020-08-07 NOTE — PROGRESS NOTES
Patients daughter was on patients phone requesting to talk to the nurse. When writer introduced herself daughter began yelling at RN that she wants to know if facility patient going to has COVID patients. Writer explained she was unsure if has but would call. Daughter continued to berate RN, stating she is from [place] and knows all facilities in her area and that it is posted daily. After reminding daughter I offered to call for her she states will call back. Writer states give me half hour to call and get information.

## 2020-08-07 NOTE — PROGRESS NOTES
Pharmacy Note  Vancomycin Consult - Follow Up     Vancomycin Therapy Day: 2  Current Dosing: vancomycin 1250 mg q12h  Current diagnosis for which MRSA is suspected/confirmed: Post left transmetatarsal amputation  ONLY for suspected pneumonia or COPD: MRSA nasal swab   N/A: Non respiratory infection. .      Temp: 97.7 F    Actual Weight:   Wt Readings from Last 1 Encounters:   08/05/20 170 lb (77.1 kg)       Recent Labs     08/06/20  0645   CREATININE 0.96     Calculate CrCl based on IBW: 87 mL/min    Recent Labs     08/06/20  0645   WBC 15.4*         Intake/Output Summary (Last 24 hours) at 8/6/2020 2006  Last data filed at 8/6/2020 1841  Gross per 24 hour   Intake 1090 ml   Output 2200 ml   Net -1110 ml       ASSESSMENT/PLAN    Trough drawn 8/6/20 18:39 was 25 mcg/mL which is well above our goal range of 15-20 mcg/mL. Hold dose tonight and redraw level prior to next scheduled dose 8/7/20 0700. Further dosing will be based on level at that time. Thank you for the consult. Will Continue to follow.   Susie Ritchie RPh  8/6/2020  8:06 PM

## 2020-08-07 NOTE — PLAN OF CARE
Problem: Falls - Risk of:  Goal: Will remain free from falls  Description: Will remain free from falls  Outcome: Ongoing  Note: Siderails up x 2  Hourly rounding. Call light in reach. Instructed to call for assist before attempting out of bed. Remains free from falls and accidental injury at this time. Floor free from obstacles, and bed is locked and in lowest position. Adequate lighting provided. Bed alarm on. Fall sticker on wristband. Fall Sign posted in doorway      Problem: Falls - Risk of:  Goal: Absence of physical injury  Description: Absence of physical injury  Outcome: Ongoing     Problem: Pain:  Goal: Pain level will decrease  Description: Pain level will decrease  Outcome: Ongoing     Problem: Pain:  Goal: Control of chronic pain  Description: Control of chronic pain  Outcome: Ongoing  Note: Patient states understanding of pain scale and interventions. Pain assessed with hourly rounding and PRN. Patient states pain level is 7/10 to LLE. Will continue to monitor.

## 2020-08-07 NOTE — DISCHARGE INSTR - DIET

## 2020-08-07 NOTE — PROGRESS NOTES
Patient discharged [facility]. Report called to facility by Kayla Tan RN. Spoke with Kaylie Amos.   LOREE completed and signed per writer and physician for  Discharge packet given to EMS to deliver to RN receiving patient       Telemetry monitor returned
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
## 2020-08-07 NOTE — CARE COORDINATION
LAVELL received call from [place] with [place] [phone] regarding the insurance pre-cert has been approved, and pt can admit tonight. SW informed pt RN Roberta and RN clinical lead Trista. Pt RN will keep SW informed regarding discharge. LAVELL received call from [facility] regarding transport has been arranged for 6pm going to [place] [phone].   LAVELL will update the facility and fax discharge paperwork      RN to call report [phone]

## 2020-08-07 NOTE — PLAN OF CARE
Problem: Falls - Risk of:  Goal: Will remain free from falls  Description: Will remain free from falls  8/6/2020 2308 by Susan Carolina RN  Outcome: Ongoing  8/6/2020 1138 by Dk Waters RN  Outcome: Ongoing

## 2020-08-07 NOTE — PROGRESS NOTES
UP Yes   Discharge Recommendations Subacute/Skilled Nursing Facility   Activity Tolerance   Activity Tolerance Treatment limited secondary to agitation  (Pt very upset,states he didn't get enough sleep and was apol)   Plan   Times per week 1x/day,  5-6days/wk   Current Treatment Recommendations Strengthening;Home Exercise Program;Balance Training; Endurance Training;Functional Mobility Training;Transfer Training; Safety Education & Training;Gait Training   Safety Devices   Type of devices Gait belt;Bed alarm in place; Left in bed;Nurse notified;Call light within reach   Progress Note   See Progress Note Yes   PT Whiteboard Notes   Therapy Whiteboard 8-7-20 PM, 2/8,SNF

## 2020-08-07 NOTE — CARE COORDINATION
LAVELL received message from [place] with [facility] regarding the onsite was completed, the referral has been reviewed, and the pt has been accepted. The insurance pre-cert will be started.

## 2020-08-07 NOTE — PROGRESS NOTES
Pharmacy Note  Vancomycin Consult - Follow Up     Vancomycin Therapy Day: 3  Current Dosing: Vancomycin 1250mg IV q 12 hours  Current diagnosis for which MRSA is suspected/confirmed: diabetic foot infection/gangrene  ONLY for suspected pneumonia or COPD: MRSA nasal swab   N/A: Non respiratory infection. .      Temp: 97.5    Actual Weight:   Wt Readings from Last 1 Encounters:   08/05/20 170 lb (77.1 kg)       Recent Labs     08/06/20  0645 08/07/20  0704   CREATININE 0.96 0.95     Calculate CrCl based on IBW: 88 ml/min    Recent Labs     08/06/20  0645   WBC 15.4*         Intake/Output Summary (Last 24 hours) at 8/7/2020 0737  Last data filed at 8/7/2020 0345  Gross per 24 hour   Intake 1260 ml   Output 1600 ml   Net -340 ml           ASSESSMENT/PLAN    Vancomycin level this AM = 12.8 ug/ml approximately 24 hours after last dose of vancomycin 1250mg . Will adjust dosing from 1250mg IV q 12 hours to 1500mg IV q 24 hours to obtain trough of 15 - 20 ug/ml. PER ID will adjust fo PO antibiotics upon discharge. Thank you for the consult. Will Continue to follow.   Champ Jasso [place]  8/7/2020  7:37 AM

## 2020-08-07 NOTE — PROGRESS NOTES
Occupational Therapy    [facility]  Occupational Therapy Not Seen Note    Patient not available for Occupational Therapy due to:    [] Testing:    [] Hemodialysis    [] Cancelled by RN:    [x]Refusal by Patient: Pt reported feeling dizzy and stated he wanted to stay in bed    [] Surgery:     [] Intubation:     [] Pain Medication:    [] Sedation:     [] Spine Precautions :    [] Medical Instability:    [] Other:      Khalida Faulkner
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
## 2020-08-08 NOTE — DISCHARGE SUMMARY
Discharge Summary  Podiatric Medicine and Surgery    Patient Identification     Charly Daniels is a 60 y.o. male  :  1955  MRN: [id number]  Acct: [de-identified]     Admit date: 2020  Discharge date and time: 2020  7:01 PM     Admitting Physician: Ambrose Santos DPM   Discharge Physician: Fredis Alejo DPM     Admission Diagnoses: Osteomyelitis [facility]) [M86.9]  Osteomyelitis [facility]) [M86.9]  Discharge Diagnoses:   Patient Active Problem List   Diagnosis    Type 2 diabetes mellitus with diabetic polyneuropathy, with long-term current use of insulin ([address]    Neuropathic pain, leg    Diabetic polyneuropathy ([address]    Allergic rhinitis    Tobacco dependence    COPD (chronic obstructive pulmonary disease) ([address]    Edentulous    Dysphagia    Lung nodules    Esophageal cancer ([address]    Fracture of humerus, left, closed    Closed fracture of humerus    DM type 2 with diabetic peripheral neuropathy ([address]    COPD without exacerbation ([address]    Dyslipidemia    Gastroesophageal reflux disease    Chronic pain syndrome    Marijuana use    History of colon polyps    Cellulitis of right heel    PVD (peripheral vascular disease) ([address]    Functional diarrhea    Sepsis due to methicillin resistant Staphylococcus aureus (MRSA) ([address]    History of esophageal cancer    Osteomyelitis, chronic, ankle or foot ([address]    Peripheral artery disease ([address]    Other pulmonary embolism without acute cor pulmonale ([address]    Carotid stenosis, asymptomatic, bilateral    Lower limb amputation status    Fx humeral neck, right, closed, initial encounter    Transient hypotension    Constipation due to opioid therapy    Acute kidney injury ([address]    Diabetic ulcer of left midfoot associated with type 2 diabetes mellitus, with fat layer exposed ([address]    Complication of below knee amputation stump ([address]    COPD exacerbation ([address]    Hyponatremia    Pain, phantom limb ([address]    Below-knee amputation of right lower extremity (HCC)    Hyperglycemia    Moderate protein malnutrition ([address]    Essential hypertension    Uncontrolled type 2 diabetes mellitus with hyperglycemia ([address]    History of pulmonary embolism - 2017    Atelectasis    Uncontrolled type 2 diabetes mellitus with foot ulcer (HCC)    Azotemia    Anemia, normocytic normochromic    Osteomyelitis of fourth phalange of left foot (HCC)    Drug-induced constipation    Osteomyelitis ([address]       Admission Condition: fair. Discharged Condition: good. Hospital Course     Brief Inpatient Course: Admitted on 8/5/2020  5:46 AM for Osteomyelitis [facility]) [M86.9]  Osteomyelitis ([facility] [M86.9]. Pt underwent TMA left foot and was admitted for pain control and IV abx until he could be placed in a SNF. He is able to ambulate without weight bearing on his left foot - strictly. Consults: ID    Patient Instructions     Medications:      Medication List      START taking these medications    amoxicillin-clavulanate 875-125 MG per tablet  Commonly known as:  AUGMENTIN  Take 1 tablet by mouth 2 times daily for 10 days     doxycycline hyclate 100 MG tablet  Commonly known as:  VIBRA-TABS  Take 1 tablet by mouth 2 times daily for 7 days     famotidine 20 MG tablet  Commonly known as:  PEPCID  Take 1 tablet by mouth 2 times daily        CHANGE how you take these medications    aspirin 81 MG chewable tablet  Take 1 tablet by mouth daily  What changed:  See the new instructions. HYDROcodone-acetaminophen 5-325 MG per tablet  Commonly known as:  Norco  Take 1 tablet by mouth every 6 hours as needed for Pain for up to 7 days. Intended supply: 3 days.  Take lowest dose possible to manage pain  What changed:  additional instructions        CONTINUE taking these medications    apixaban 5 MG Tabs tablet  Commonly known as:  Eliquis  Take 1 tablet by mouth 2 times daily     atorvastatin 40 MG tablet  Commonly known as:  LIPITOR  Take 1 tablet by mouth daily     blood glucose test strips strip  Commonly known as:  ASCENSIA AUTODISC VI;ONE TOUCH ULTRA TEST VI  Use with associated glucose meter to check fluctuating blood sugars BID     glucose monitoring kit monitoring kit  1 kit by Does not apply route daily     * insulin lispro 100 UNIT/ML injection vial  Commonly known as:  HUMALOG  Inject 0-18 Units into the skin 3 times daily (with meals)     * insulin lispro 100 UNIT/ML injection vial  Commonly known as:  HUMALOG  Inject 0-9 Units into the skin nightly     Insulin Pen Needle 32G X 4 MM Misc  1 each by Does not apply route daily     ipratropium-albuterol 0.5-2.5 (3) MG/3ML Soln nebulizer solution  Commonly known as:  DUONEB  Inhale 3 mLs into the lungs every 4 hours (while awake)     metFORMIN 500 MG tablet  Commonly known as:  GLUCOPHAGE  Take 1 tablet by mouth 2 times daily (with meals)     pantoprazole 40 MG tablet  Commonly known as:  PROTONIX     Tresiba FlexTouch 100 UNIT/ML Sopn  Generic drug:  Insulin Degludec     * TRUEplus Lancets 30G Misc  Inject 1 each into the skin 3 times daily TO CHECK FLUCTUATING BLOOD SUGARS IE HYPERGLYCEMIA     * Lancets Misc  1 each by Does not apply route 2 times daily         * This list has 4 medication(s) that are the same as other medications prescribed for you. Read the directions carefully, and ask your doctor or other care provider to review them with you.                Where to Get Your Medications      These medications were sent to [address] [place] [address]    Phone:  [phone]   · amoxicillin-clavulanate 875-125 MG per tablet     You can get these medications from any pharmacy    Bring a paper prescription for each of these medications  · aspirin 81 MG chewable tablet  · doxycycline hyclate 100 MG tablet  · famotidine 20 MG tablet  · HYDROcodone-acetaminophen 5-325 MG per tablet         Activity:  Strict non-weight bearing left foot. Diet: The patient is asked to make an attempt to improve diet and exercise patterns to aid in medical management of this problem. Disposition: SNF. Wound Care: keep wound clean and dry. Follow-up: Follow up with Dr. Lalito Argueta within 1 week of discharge, call the office for an appointment. Greater than 30 minutes was spent reviewing pertinent details of patient notes and summarization of the patient's inpatient stay.     Electronically signed by Miya Naik DPM on 8/8/2020 at 1:34 PM

## 2020-08-10 LAB
CULTURE: NORMAL
DIRECT EXAM: NORMAL
DIRECT EXAM: NORMAL
Lab: NORMAL
SPECIMEN DESCRIPTION: NORMAL

## 2020-08-22 ENCOUNTER — HOSPITAL ENCOUNTER (INPATIENT)
Age: 65
LOS: 12 days | Discharge: HOME HEALTH | DRG: 475 | End: 2020-09-03
Attending: EMERGENCY MEDICINE | Admitting: INTERNAL MEDICINE
Payer: MEDICARE

## 2020-08-22 ENCOUNTER — APPOINTMENT (OUTPATIENT)
Dept: GENERAL RADIOLOGY | Age: 65
DRG: 475 | End: 2020-08-22
Payer: MEDICARE

## 2020-08-22 PROBLEM — L08.9 DIABETIC FOOT INFECTION (HCC): Status: ACTIVE | Noted: 2020-08-22

## 2020-08-22 PROBLEM — E11.628 DIABETIC FOOT INFECTION (HCC): Status: ACTIVE | Noted: 2020-08-22

## 2020-08-22 LAB
ABSOLUTE EOS #: 0.42 K/UL (ref 0–0.44)
ABSOLUTE IMMATURE GRANULOCYTE: 0.07 K/UL (ref 0–0.3)
ABSOLUTE LYMPH #: 2.34 K/UL (ref 1.1–3.7)
ABSOLUTE MONO #: 0.95 K/UL (ref 0.1–1.2)
ANION GAP SERPL CALCULATED.3IONS-SCNC: 12 MMOL/L (ref 9–17)
BASOPHILS # BLD: 1 % (ref 0–2)
BASOPHILS ABSOLUTE: 0.1 K/UL (ref 0–0.2)
BUN BLDV-MCNC: 21 MG/DL (ref 8–23)
BUN/CREAT BLD: 24 (ref 9–20)
C-REACTIVE PROTEIN: 27.4 MG/L (ref 0–5)
CALCIUM SERPL-MCNC: 9.1 MG/DL (ref 8.6–10.4)
CHLORIDE BLD-SCNC: 96 MMOL/L (ref 98–107)
CO2: 26 MMOL/L (ref 20–31)
CREAT SERPL-MCNC: 0.88 MG/DL (ref 0.7–1.2)
DIFFERENTIAL TYPE: ABNORMAL
EOSINOPHILS RELATIVE PERCENT: 3 % (ref 1–4)
GFR AFRICAN AMERICAN: >60 ML/MIN
GFR NON-AFRICAN AMERICAN: >60 ML/MIN
GFR SERPL CREATININE-BSD FRML MDRD: ABNORMAL ML/MIN/{1.73_M2}
GFR SERPL CREATININE-BSD FRML MDRD: ABNORMAL ML/MIN/{1.73_M2}
GLUCOSE BLD-MCNC: 357 MG/DL (ref 75–110)
GLUCOSE BLD-MCNC: 484 MG/DL (ref 70–99)
HCT VFR BLD CALC: 35.4 % (ref 40.7–50.3)
HEMOGLOBIN: 10.9 G/DL (ref 13–17)
IMMATURE GRANULOCYTES: 1 %
LACTIC ACID, SEPSIS WHOLE BLOOD: NORMAL MMOL/L (ref 0.5–1.9)
LACTIC ACID, SEPSIS WHOLE BLOOD: NORMAL MMOL/L (ref 0.5–1.9)
LACTIC ACID, SEPSIS: 1 MMOL/L (ref 0.5–1.9)
LACTIC ACID, SEPSIS: 1.3 MMOL/L (ref 0.5–1.9)
LACTIC ACID: 1.7 MMOL/L (ref 0.5–2.2)
LYMPHOCYTES # BLD: 17 % (ref 24–43)
MCH RBC QN AUTO: 28.8 PG (ref 25.2–33.5)
MCHC RBC AUTO-ENTMCNC: 30.8 G/DL (ref 28.4–34.8)
MCV RBC AUTO: 93.7 FL (ref 82.6–102.9)
MONOCYTES # BLD: 7 % (ref 3–12)
NRBC AUTOMATED: 0 PER 100 WBC
PDW BLD-RTO: 12.4 % (ref 11.8–14.4)
PLATELET # BLD: 466 K/UL (ref 138–453)
PLATELET ESTIMATE: ABNORMAL
PMV BLD AUTO: 9.8 FL (ref 8.1–13.5)
POTASSIUM SERPL-SCNC: 4.4 MMOL/L (ref 3.7–5.3)
RBC # BLD: 3.78 M/UL (ref 4.21–5.77)
RBC # BLD: ABNORMAL 10*6/UL
SEDIMENTATION RATE, ERYTHROCYTE: 74 MM (ref 0–20)
SEG NEUTROPHILS: 71 % (ref 36–65)
SEGMENTED NEUTROPHILS ABSOLUTE COUNT: 9.82 K/UL (ref 1.5–8.1)
SODIUM BLD-SCNC: 134 MMOL/L (ref 135–144)
WBC # BLD: 13.7 K/UL (ref 3.5–11.3)
WBC # BLD: ABNORMAL 10*3/UL

## 2020-08-22 PROCEDURE — 85652 RBC SED RATE AUTOMATED: CPT

## 2020-08-22 PROCEDURE — 87205 SMEAR GRAM STAIN: CPT

## 2020-08-22 PROCEDURE — 96374 THER/PROPH/DIAG INJ IV PUSH: CPT

## 2020-08-22 PROCEDURE — 82947 ASSAY GLUCOSE BLOOD QUANT: CPT

## 2020-08-22 PROCEDURE — 0QBR0ZZ EXCISION OF LEFT TOE PHALANX, OPEN APPROACH: ICD-10-PCS | Performed by: STUDENT IN AN ORGANIZED HEALTH CARE EDUCATION/TRAINING PROGRAM

## 2020-08-22 PROCEDURE — 99222 1ST HOSP IP/OBS MODERATE 55: CPT | Performed by: NURSE PRACTITIONER

## 2020-08-22 PROCEDURE — 6360000002 HC RX W HCPCS: Performed by: EMERGENCY MEDICINE

## 2020-08-22 PROCEDURE — 87040 BLOOD CULTURE FOR BACTERIA: CPT

## 2020-08-22 PROCEDURE — 1200000000 HC SEMI PRIVATE

## 2020-08-22 PROCEDURE — 6360000002 HC RX W HCPCS: Performed by: INTERNAL MEDICINE

## 2020-08-22 PROCEDURE — 80048 BASIC METABOLIC PNL TOTAL CA: CPT

## 2020-08-22 PROCEDURE — 83605 ASSAY OF LACTIC ACID: CPT

## 2020-08-22 PROCEDURE — 73630 X-RAY EXAM OF FOOT: CPT

## 2020-08-22 PROCEDURE — 99285 EMERGENCY DEPT VISIT HI MDM: CPT

## 2020-08-22 PROCEDURE — 36415 COLL VENOUS BLD VENIPUNCTURE: CPT

## 2020-08-22 PROCEDURE — 6370000000 HC RX 637 (ALT 250 FOR IP): Performed by: EMERGENCY MEDICINE

## 2020-08-22 PROCEDURE — 86140 C-REACTIVE PROTEIN: CPT

## 2020-08-22 PROCEDURE — 87075 CULTR BACTERIA EXCEPT BLOOD: CPT

## 2020-08-22 PROCEDURE — 2580000003 HC RX 258: Performed by: EMERGENCY MEDICINE

## 2020-08-22 PROCEDURE — 6360000002 HC RX W HCPCS: Performed by: NURSE PRACTITIONER

## 2020-08-22 PROCEDURE — 6370000000 HC RX 637 (ALT 250 FOR IP): Performed by: STUDENT IN AN ORGANIZED HEALTH CARE EDUCATION/TRAINING PROGRAM

## 2020-08-22 PROCEDURE — 6370000000 HC RX 637 (ALT 250 FOR IP): Performed by: INTERNAL MEDICINE

## 2020-08-22 PROCEDURE — 87070 CULTURE OTHR SPECIMN AEROBIC: CPT

## 2020-08-22 PROCEDURE — 2580000003 HC RX 258: Performed by: INTERNAL MEDICINE

## 2020-08-22 PROCEDURE — 85025 COMPLETE CBC W/AUTO DIFF WBC: CPT

## 2020-08-22 RX ORDER — POLYETHYLENE GLYCOL 3350 17 G/17G
17 POWDER, FOR SOLUTION ORAL DAILY PRN
Status: DISCONTINUED | OUTPATIENT
Start: 2020-08-22 | End: 2020-09-03 | Stop reason: HOSPADM

## 2020-08-22 RX ORDER — SODIUM CHLORIDE 0.9 % (FLUSH) 0.9 %
10 SYRINGE (ML) INJECTION EVERY 12 HOURS SCHEDULED
Status: DISCONTINUED | OUTPATIENT
Start: 2020-08-22 | End: 2020-09-03 | Stop reason: HOSPADM

## 2020-08-22 RX ORDER — OXYCODONE HYDROCHLORIDE AND ACETAMINOPHEN 5; 325 MG/1; MG/1
1 TABLET ORAL EVERY 6 HOURS PRN
Status: DISCONTINUED | OUTPATIENT
Start: 2020-08-22 | End: 2020-08-25

## 2020-08-22 RX ORDER — IPRATROPIUM BROMIDE AND ALBUTEROL SULFATE 2.5; .5 MG/3ML; MG/3ML
3 SOLUTION RESPIRATORY (INHALATION)
Status: DISCONTINUED | OUTPATIENT
Start: 2020-08-23 | End: 2020-08-30

## 2020-08-22 RX ORDER — SODIUM CHLORIDE 0.9 % (FLUSH) 0.9 %
10 SYRINGE (ML) INJECTION PRN
Status: DISCONTINUED | OUTPATIENT
Start: 2020-08-22 | End: 2020-09-03 | Stop reason: HOSPADM

## 2020-08-22 RX ORDER — NORTRIPTYLINE HYDROCHLORIDE 25 MG/1
50 CAPSULE ORAL NIGHTLY
Status: DISCONTINUED | OUTPATIENT
Start: 2020-08-22 | End: 2020-09-03 | Stop reason: HOSPADM

## 2020-08-22 RX ORDER — NICOTINE POLACRILEX 4 MG
15 LOZENGE BUCCAL PRN
Status: DISCONTINUED | OUTPATIENT
Start: 2020-08-22 | End: 2020-09-03 | Stop reason: HOSPADM

## 2020-08-22 RX ORDER — NORTRIPTYLINE HYDROCHLORIDE 25 MG/1
50 CAPSULE ORAL NIGHTLY
COMMUNITY
End: 2021-02-03

## 2020-08-22 RX ORDER — MORPHINE SULFATE 4 MG/ML
4 INJECTION, SOLUTION INTRAMUSCULAR; INTRAVENOUS ONCE
Status: COMPLETED | OUTPATIENT
Start: 2020-08-22 | End: 2020-08-22

## 2020-08-22 RX ORDER — INSULIN GLARGINE 100 [IU]/ML
70 INJECTION, SOLUTION SUBCUTANEOUS NIGHTLY
Status: DISCONTINUED | OUTPATIENT
Start: 2020-08-22 | End: 2020-09-03 | Stop reason: HOSPADM

## 2020-08-22 RX ORDER — DEXTROSE MONOHYDRATE 50 MG/ML
100 INJECTION, SOLUTION INTRAVENOUS PRN
Status: DISCONTINUED | OUTPATIENT
Start: 2020-08-22 | End: 2020-09-03 | Stop reason: HOSPADM

## 2020-08-22 RX ORDER — MORPHINE SULFATE 2 MG/ML
2 INJECTION, SOLUTION INTRAMUSCULAR; INTRAVENOUS ONCE
Status: COMPLETED | OUTPATIENT
Start: 2020-08-22 | End: 2020-08-22

## 2020-08-22 RX ORDER — POTASSIUM CHLORIDE 20 MEQ/1
40 TABLET, EXTENDED RELEASE ORAL PRN
Status: DISCONTINUED | OUTPATIENT
Start: 2020-08-22 | End: 2020-09-03 | Stop reason: HOSPADM

## 2020-08-22 RX ORDER — ASPIRIN 81 MG/1
81 TABLET, CHEWABLE ORAL DAILY
Status: DISCONTINUED | OUTPATIENT
Start: 2020-08-22 | End: 2020-09-03 | Stop reason: HOSPADM

## 2020-08-22 RX ORDER — DILTIAZEM HYDROCHLORIDE 60 MG/1
60 TABLET, FILM COATED ORAL 4 TIMES DAILY
Status: DISCONTINUED | OUTPATIENT
Start: 2020-08-22 | End: 2020-09-03 | Stop reason: HOSPADM

## 2020-08-22 RX ORDER — SODIUM CHLORIDE 9 MG/ML
INJECTION, SOLUTION INTRAVENOUS CONTINUOUS
Status: DISCONTINUED | OUTPATIENT
Start: 2020-08-22 | End: 2020-08-29

## 2020-08-22 RX ORDER — ONDANSETRON 2 MG/ML
4 INJECTION INTRAMUSCULAR; INTRAVENOUS EVERY 6 HOURS PRN
Status: DISCONTINUED | OUTPATIENT
Start: 2020-08-22 | End: 2020-09-03 | Stop reason: HOSPADM

## 2020-08-22 RX ORDER — PROMETHAZINE HYDROCHLORIDE 12.5 MG/1
12.5 TABLET ORAL EVERY 6 HOURS PRN
Status: DISCONTINUED | OUTPATIENT
Start: 2020-08-22 | End: 2020-09-03 | Stop reason: HOSPADM

## 2020-08-22 RX ORDER — MAGNESIUM SULFATE 1 G/100ML
1 INJECTION INTRAVENOUS PRN
Status: DISCONTINUED | OUTPATIENT
Start: 2020-08-22 | End: 2020-09-03 | Stop reason: HOSPADM

## 2020-08-22 RX ORDER — ACETAMINOPHEN 650 MG/1
650 SUPPOSITORY RECTAL EVERY 6 HOURS PRN
Status: DISCONTINUED | OUTPATIENT
Start: 2020-08-22 | End: 2020-09-03 | Stop reason: HOSPADM

## 2020-08-22 RX ORDER — POTASSIUM CHLORIDE 7.45 MG/ML
10 INJECTION INTRAVENOUS PRN
Status: DISCONTINUED | OUTPATIENT
Start: 2020-08-22 | End: 2020-09-03 | Stop reason: HOSPADM

## 2020-08-22 RX ORDER — DILTIAZEM HYDROCHLORIDE 60 MG/1
60 TABLET, FILM COATED ORAL 4 TIMES DAILY
COMMUNITY
End: 2020-09-05 | Stop reason: SDUPTHER

## 2020-08-22 RX ORDER — ATORVASTATIN CALCIUM 40 MG/1
40 TABLET, FILM COATED ORAL DAILY
Status: DISCONTINUED | OUTPATIENT
Start: 2020-08-22 | End: 2020-09-03 | Stop reason: HOSPADM

## 2020-08-22 RX ORDER — 0.9 % SODIUM CHLORIDE 0.9 %
1000 INTRAVENOUS SOLUTION INTRAVENOUS ONCE
Status: COMPLETED | OUTPATIENT
Start: 2020-08-22 | End: 2020-08-22

## 2020-08-22 RX ORDER — NICOTINE 21 MG/24HR
1 PATCH, TRANSDERMAL 24 HOURS TRANSDERMAL DAILY PRN
Status: DISCONTINUED | OUTPATIENT
Start: 2020-08-22 | End: 2020-09-03 | Stop reason: HOSPADM

## 2020-08-22 RX ORDER — FAMOTIDINE 20 MG/1
20 TABLET, FILM COATED ORAL 2 TIMES DAILY
Status: DISCONTINUED | OUTPATIENT
Start: 2020-08-22 | End: 2020-09-03 | Stop reason: HOSPADM

## 2020-08-22 RX ORDER — DEXTROSE MONOHYDRATE 25 G/50ML
12.5 INJECTION, SOLUTION INTRAVENOUS PRN
Status: DISCONTINUED | OUTPATIENT
Start: 2020-08-22 | End: 2020-09-03 | Stop reason: HOSPADM

## 2020-08-22 RX ORDER — ACETAMINOPHEN 325 MG/1
650 TABLET ORAL EVERY 6 HOURS PRN
Status: DISCONTINUED | OUTPATIENT
Start: 2020-08-22 | End: 2020-09-03 | Stop reason: HOSPADM

## 2020-08-22 RX ADMIN — MORPHINE SULFATE 4 MG: 4 INJECTION, SOLUTION INTRAMUSCULAR; INTRAVENOUS at 17:06

## 2020-08-22 RX ADMIN — ASPIRIN 81 MG 81 MG: 81 TABLET ORAL at 22:09

## 2020-08-22 RX ADMIN — MORPHINE SULFATE 2 MG: 2 INJECTION, SOLUTION INTRAMUSCULAR; INTRAVENOUS at 22:54

## 2020-08-22 RX ADMIN — DILTIAZEM HYDROCHLORIDE 60 MG: 60 TABLET, FILM COATED ORAL at 22:35

## 2020-08-22 RX ADMIN — ATORVASTATIN CALCIUM 40 MG: 40 TABLET, FILM COATED ORAL at 22:09

## 2020-08-22 RX ADMIN — INSULIN GLARGINE 70 UNITS: 100 INJECTION, SOLUTION SUBCUTANEOUS at 22:35

## 2020-08-22 RX ADMIN — INSULIN HUMAN 6 UNITS: 100 INJECTION, SOLUTION PARENTERAL at 18:30

## 2020-08-22 RX ADMIN — APIXABAN 5 MG: 5 TABLET, FILM COATED ORAL at 22:09

## 2020-08-22 RX ADMIN — METFORMIN HYDROCHLORIDE 500 MG: 500 TABLET ORAL at 22:09

## 2020-08-22 RX ADMIN — PIPERACILLIN SODIUM AND TAZOBACTAM SODIUM 4.5 G: 4; .5 INJECTION, POWDER, LYOPHILIZED, FOR SOLUTION INTRAVENOUS at 22:53

## 2020-08-22 RX ADMIN — NORTRIPTYLINE HYDROCHLORIDE 50 MG: 25 CAPSULE ORAL at 22:53

## 2020-08-22 RX ADMIN — OXYCODONE HYDROCHLORIDE AND ACETAMINOPHEN 1 TABLET: 5; 325 TABLET ORAL at 20:44

## 2020-08-22 RX ADMIN — FAMOTIDINE 20 MG: 20 TABLET ORAL at 22:08

## 2020-08-22 RX ADMIN — SODIUM CHLORIDE 1000 ML: 9 INJECTION, SOLUTION INTRAVENOUS at 18:38

## 2020-08-22 RX ADMIN — CEFEPIME HYDROCHLORIDE 2 G: 2 INJECTION, POWDER, FOR SOLUTION INTRAVENOUS at 18:30

## 2020-08-22 RX ADMIN — VANCOMYCIN HYDROCHLORIDE 2000 MG: 1 INJECTION, POWDER, LYOPHILIZED, FOR SOLUTION INTRAVENOUS at 19:34

## 2020-08-22 ASSESSMENT — PAIN DESCRIPTION - ORIENTATION
ORIENTATION: LEFT
ORIENTATION: LEFT

## 2020-08-22 ASSESSMENT — ENCOUNTER SYMPTOMS
EYE PAIN: 0
FACIAL SWELLING: 0
ABDOMINAL PAIN: 0
PHOTOPHOBIA: 0
CHEST TIGHTNESS: 0
SORE THROAT: 0
DIARRHEA: 0
VOMITING: 0
SHORTNESS OF BREATH: 0
NAUSEA: 0

## 2020-08-22 ASSESSMENT — PAIN DESCRIPTION - FREQUENCY
FREQUENCY: CONTINUOUS
FREQUENCY: CONTINUOUS

## 2020-08-22 ASSESSMENT — PAIN DESCRIPTION - LOCATION
LOCATION: FOOT
LOCATION: FOOT

## 2020-08-22 ASSESSMENT — PAIN SCALES - GENERAL
PAINLEVEL_OUTOF10: 8
PAINLEVEL_OUTOF10: 8
PAINLEVEL_OUTOF10: 10
PAINLEVEL_OUTOF10: 8
PAINLEVEL_OUTOF10: 6

## 2020-08-22 ASSESSMENT — PAIN - FUNCTIONAL ASSESSMENT
PAIN_FUNCTIONAL_ASSESSMENT: PREVENTS OR INTERFERES SOME ACTIVE ACTIVITIES AND ADLS
PAIN_FUNCTIONAL_ASSESSMENT: PREVENTS OR INTERFERES SOME ACTIVE ACTIVITIES AND ADLS

## 2020-08-22 ASSESSMENT — PAIN DESCRIPTION - ONSET
ONSET: ON-GOING
ONSET: ON-GOING

## 2020-08-22 ASSESSMENT — PAIN DESCRIPTION - PAIN TYPE
TYPE: ACUTE PAIN;SURGICAL PAIN
TYPE: SURGICAL PAIN;ACUTE PAIN

## 2020-08-22 ASSESSMENT — PAIN DESCRIPTION - DESCRIPTORS
DESCRIPTORS: CONSTANT;DISCOMFORT
DESCRIPTORS: DISCOMFORT;CONSTANT
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
## 2020-08-22 NOTE — ED NOTES
Pt arrived with c/o surgical site pain, left foot. Pt states that he has not been taking any of his medications for 2 days and has been sleeping. He says he just doesn't feel right.       Ebonie Reddy RN  08/22/20 1640
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
## 2020-08-23 PROBLEM — Z91.199 NONCOMPLIANCE: Status: ACTIVE | Noted: 2020-08-23

## 2020-08-23 LAB
ANION GAP SERPL CALCULATED.3IONS-SCNC: 9 MMOL/L (ref 9–17)
BUN BLDV-MCNC: 19 MG/DL (ref 8–23)
BUN/CREAT BLD: 23 (ref 9–20)
CALCIUM SERPL-MCNC: 8.9 MG/DL (ref 8.6–10.4)
CHLORIDE BLD-SCNC: 102 MMOL/L (ref 98–107)
CO2: 28 MMOL/L (ref 20–31)
CREAT SERPL-MCNC: 0.84 MG/DL (ref 0.7–1.2)
GFR AFRICAN AMERICAN: >60 ML/MIN
GFR NON-AFRICAN AMERICAN: >60 ML/MIN
GFR SERPL CREATININE-BSD FRML MDRD: ABNORMAL ML/MIN/{1.73_M2}
GFR SERPL CREATININE-BSD FRML MDRD: ABNORMAL ML/MIN/{1.73_M2}
GLUCOSE BLD-MCNC: 135 MG/DL (ref 75–110)
GLUCOSE BLD-MCNC: 151 MG/DL (ref 75–110)
GLUCOSE BLD-MCNC: 153 MG/DL (ref 75–110)
GLUCOSE BLD-MCNC: 241 MG/DL (ref 70–99)
GLUCOSE BLD-MCNC: 339 MG/DL (ref 75–110)
GLUCOSE BLD-MCNC: 425 MG/DL (ref 75–110)
HCT VFR BLD CALC: 33.9 % (ref 40.7–50.3)
HEMOGLOBIN: 10.1 G/DL (ref 13–17)
MCH RBC QN AUTO: 28.4 PG (ref 25.2–33.5)
MCHC RBC AUTO-ENTMCNC: 29.8 G/DL (ref 28.4–34.8)
MCV RBC AUTO: 95.2 FL (ref 82.6–102.9)
NRBC AUTOMATED: 0 PER 100 WBC
PDW BLD-RTO: 12.3 % (ref 11.8–14.4)
PLATELET # BLD: 420 K/UL (ref 138–453)
PMV BLD AUTO: 9.5 FL (ref 8.1–13.5)
POTASSIUM SERPL-SCNC: 3.6 MMOL/L (ref 3.7–5.3)
RBC # BLD: 3.56 M/UL (ref 4.21–5.77)
SARS-COV-2, PCR: NORMAL
SARS-COV-2, RAPID: NORMAL
SARS-COV-2: NOT DETECTED
SODIUM BLD-SCNC: 139 MMOL/L (ref 135–144)
SOURCE: NORMAL
WBC # BLD: 12.6 K/UL (ref 3.5–11.3)

## 2020-08-23 PROCEDURE — 6360000002 HC RX W HCPCS: Performed by: INTERNAL MEDICINE

## 2020-08-23 PROCEDURE — 6370000000 HC RX 637 (ALT 250 FOR IP): Performed by: INTERNAL MEDICINE

## 2020-08-23 PROCEDURE — 85027 COMPLETE CBC AUTOMATED: CPT

## 2020-08-23 PROCEDURE — 6360000002 HC RX W HCPCS: Performed by: STUDENT IN AN ORGANIZED HEALTH CARE EDUCATION/TRAINING PROGRAM

## 2020-08-23 PROCEDURE — 36415 COLL VENOUS BLD VENIPUNCTURE: CPT

## 2020-08-23 PROCEDURE — U0003 INFECTIOUS AGENT DETECTION BY NUCLEIC ACID (DNA OR RNA); SEVERE ACUTE RESPIRATORY SYNDROME CORONAVIRUS 2 (SARS-COV-2) (CORONAVIRUS DISEASE [COVID-19]), AMPLIFIED PROBE TECHNIQUE, MAKING USE OF HIGH THROUGHPUT TECHNOLOGIES AS DESCRIBED BY CMS-2020-01-R: HCPCS

## 2020-08-23 PROCEDURE — 2580000003 HC RX 258: Performed by: INTERNAL MEDICINE

## 2020-08-23 PROCEDURE — 6370000000 HC RX 637 (ALT 250 FOR IP): Performed by: STUDENT IN AN ORGANIZED HEALTH CARE EDUCATION/TRAINING PROGRAM

## 2020-08-23 PROCEDURE — 1200000000 HC SEMI PRIVATE

## 2020-08-23 PROCEDURE — 99232 SBSQ HOSP IP/OBS MODERATE 35: CPT | Performed by: INTERNAL MEDICINE

## 2020-08-23 PROCEDURE — 6370000000 HC RX 637 (ALT 250 FOR IP): Performed by: NURSE PRACTITIONER

## 2020-08-23 PROCEDURE — 82947 ASSAY GLUCOSE BLOOD QUANT: CPT

## 2020-08-23 PROCEDURE — 99222 1ST HOSP IP/OBS MODERATE 55: CPT | Performed by: INTERNAL MEDICINE

## 2020-08-23 PROCEDURE — 80048 BASIC METABOLIC PNL TOTAL CA: CPT

## 2020-08-23 RX ORDER — KETOROLAC TROMETHAMINE 15 MG/ML
15 INJECTION, SOLUTION INTRAMUSCULAR; INTRAVENOUS EVERY 6 HOURS PRN
Status: DISPENSED | OUTPATIENT
Start: 2020-08-23 | End: 2020-08-25

## 2020-08-23 RX ADMIN — PIPERACILLIN AND TAZOBACTAM 3.38 G: 3; .375 INJECTION, POWDER, LYOPHILIZED, FOR SOLUTION INTRAVENOUS at 05:21

## 2020-08-23 RX ADMIN — ONDANSETRON 4 MG: 2 INJECTION INTRAMUSCULAR; INTRAVENOUS at 08:14

## 2020-08-23 RX ADMIN — Medication 10 ML: at 16:58

## 2020-08-23 RX ADMIN — OXYCODONE HYDROCHLORIDE AND ACETAMINOPHEN 1 TABLET: 5; 325 TABLET ORAL at 19:29

## 2020-08-23 RX ADMIN — FAMOTIDINE 20 MG: 20 TABLET ORAL at 19:29

## 2020-08-23 RX ADMIN — APIXABAN 5 MG: 5 TABLET, FILM COATED ORAL at 08:08

## 2020-08-23 RX ADMIN — KETOROLAC TROMETHAMINE 15 MG: 15 INJECTION, SOLUTION INTRAMUSCULAR; INTRAVENOUS at 16:58

## 2020-08-23 RX ADMIN — OXYCODONE HYDROCHLORIDE AND ACETAMINOPHEN 1 TABLET: 5; 325 TABLET ORAL at 12:14

## 2020-08-23 RX ADMIN — PIPERACILLIN AND TAZOBACTAM 3.38 G: 3; .375 INJECTION, POWDER, LYOPHILIZED, FOR SOLUTION INTRAVENOUS at 21:53

## 2020-08-23 RX ADMIN — METFORMIN HYDROCHLORIDE 500 MG: 500 TABLET ORAL at 08:08

## 2020-08-23 RX ADMIN — METFORMIN HYDROCHLORIDE 500 MG: 500 TABLET ORAL at 16:54

## 2020-08-23 RX ADMIN — INSULIN LISPRO 3 UNITS: 100 INJECTION, SOLUTION INTRAVENOUS; SUBCUTANEOUS at 12:15

## 2020-08-23 RX ADMIN — DILTIAZEM HYDROCHLORIDE 60 MG: 60 TABLET, FILM COATED ORAL at 08:08

## 2020-08-23 RX ADMIN — VANCOMYCIN HYDROCHLORIDE 1250 MG: 1.25 INJECTION, POWDER, LYOPHILIZED, FOR SOLUTION INTRAVENOUS at 08:21

## 2020-08-23 RX ADMIN — PIPERACILLIN AND TAZOBACTAM 3.38 G: 3; .375 INJECTION, POWDER, LYOPHILIZED, FOR SOLUTION INTRAVENOUS at 12:15

## 2020-08-23 RX ADMIN — VANCOMYCIN HYDROCHLORIDE 1250 MG: 1.25 INJECTION, POWDER, LYOPHILIZED, FOR SOLUTION INTRAVENOUS at 19:29

## 2020-08-23 RX ADMIN — DILTIAZEM HYDROCHLORIDE 60 MG: 60 TABLET, FILM COATED ORAL at 16:54

## 2020-08-23 RX ADMIN — FAMOTIDINE 20 MG: 20 TABLET ORAL at 08:08

## 2020-08-23 RX ADMIN — SODIUM CHLORIDE: 9 INJECTION, SOLUTION INTRAVENOUS at 10:23

## 2020-08-23 RX ADMIN — DILTIAZEM HYDROCHLORIDE 60 MG: 60 TABLET, FILM COATED ORAL at 12:14

## 2020-08-23 RX ADMIN — NORTRIPTYLINE HYDROCHLORIDE 50 MG: 25 CAPSULE ORAL at 19:29

## 2020-08-23 RX ADMIN — OXYCODONE HYDROCHLORIDE AND ACETAMINOPHEN 1 TABLET: 5; 325 TABLET ORAL at 05:21

## 2020-08-23 RX ADMIN — SODIUM CHLORIDE, PRESERVATIVE FREE 10 ML: 5 INJECTION INTRAVENOUS at 08:08

## 2020-08-23 RX ADMIN — ATORVASTATIN CALCIUM 40 MG: 40 TABLET, FILM COATED ORAL at 08:08

## 2020-08-23 RX ADMIN — INSULIN LISPRO 9 UNITS: 100 INJECTION, SOLUTION INTRAVENOUS; SUBCUTANEOUS at 06:47

## 2020-08-23 RX ADMIN — INSULIN LISPRO 6 UNITS: 100 INJECTION, SOLUTION INTRAVENOUS; SUBCUTANEOUS at 21:52

## 2020-08-23 RX ADMIN — DILTIAZEM HYDROCHLORIDE 60 MG: 60 TABLET, FILM COATED ORAL at 21:52

## 2020-08-23 RX ADMIN — ASPIRIN 81 MG 81 MG: 81 TABLET ORAL at 08:08

## 2020-08-23 RX ADMIN — INSULIN GLARGINE 70 UNITS: 100 INJECTION, SOLUTION SUBCUTANEOUS at 21:52

## 2020-08-23 SDOH — HEALTH STABILITY: MENTAL HEALTH: HOW OFTEN DO YOU HAVE A DRINK CONTAINING ALCOHOL?: NOT ASKED

## 2020-08-23 ASSESSMENT — ENCOUNTER SYMPTOMS
GASTROINTESTINAL NEGATIVE: 1
BACK PAIN: 0
EYES NEGATIVE: 1
COLOR CHANGE: 0
RESPIRATORY NEGATIVE: 1
ALLERGIC/IMMUNOLOGIC NEGATIVE: 1

## 2020-08-23 ASSESSMENT — PAIN DESCRIPTION - LOCATION
LOCATION: FOOT
LOCATION: OTHER (COMMENT)
LOCATION: FOOT
LOCATION: FOOT

## 2020-08-23 ASSESSMENT — PAIN - FUNCTIONAL ASSESSMENT
PAIN_FUNCTIONAL_ASSESSMENT: PREVENTS OR INTERFERES WITH ALL ACTIVE AND SOME PASSIVE ACTIVITIES
PAIN_FUNCTIONAL_ASSESSMENT: PREVENTS OR INTERFERES WITH ALL ACTIVE AND SOME PASSIVE ACTIVITIES
PAIN_FUNCTIONAL_ASSESSMENT: PREVENTS OR INTERFERES SOME ACTIVE ACTIVITIES AND ADLS

## 2020-08-23 ASSESSMENT — PAIN DESCRIPTION - ORIENTATION
ORIENTATION: LEFT

## 2020-08-23 ASSESSMENT — PAIN DESCRIPTION - PROGRESSION
CLINICAL_PROGRESSION: NOT CHANGED
CLINICAL_PROGRESSION: GRADUALLY WORSENING

## 2020-08-23 ASSESSMENT — PAIN SCALES - GENERAL
PAINLEVEL_OUTOF10: 7
PAINLEVEL_OUTOF10: 8
PAINLEVEL_OUTOF10: 7
PAINLEVEL_OUTOF10: 7
PAINLEVEL_OUTOF10: 8

## 2020-08-23 ASSESSMENT — PAIN DESCRIPTION - DESCRIPTORS
DESCRIPTORS: PINS AND NEEDLES
DESCRIPTORS: CONSTANT;DISCOMFORT
DESCRIPTORS: PINS AND NEEDLES
DESCRIPTORS: PATIENT UNABLE TO DESCRIBE

## 2020-08-23 ASSESSMENT — PAIN DESCRIPTION - PAIN TYPE
TYPE: ACUTE PAIN;SURGICAL PAIN
TYPE: ACUTE PAIN
TYPE: ACUTE PAIN
TYPE: CHRONIC PAIN;ACUTE PAIN

## 2020-08-23 ASSESSMENT — PAIN DESCRIPTION - FREQUENCY
FREQUENCY: CONTINUOUS

## 2020-08-23 ASSESSMENT — PAIN DESCRIPTION - ONSET
ONSET: ON-GOING

## 2020-08-23 NOTE — CARE COORDINATION
Podiatry rounded. Plan for revision lt TMA 8-24 at 5:30pm.  COVID pending. Strict NWB LLE. Continue to follow post op.

## 2020-08-23 NOTE — CARE COORDINATION
Case Management Initial Discharge [address],             Met with:patient to discuss discharge plans. Information verified: address, contacts, phone number, , insurance Yes  PCP: Juanjo Isaac DO  Date of last visit: 20     Insurance Provider: [facility]    Discharge Planning    Living Arrangements:  Alone   Support Systems:  [id number] [address] has 1 stories  2 stairs to climb to get into front door, 0 stairs to climb to reach second floor  Location of bedroom/bathroom in home  - main floor    Patient able to perform ADL's:Independent    Current Services (outpatient & in home) [employer]  DME equipment: shower bench  DME provider: N/A    Pharmacy: [facility]    Potential Assistance Needed:  Home Care    Patient agreeable to home care: Yes  Freedom of choice provided:  yes    Prior SNF/Rehab Placement and Facility: [address] [place] [address] [facility] [address] to SNF/Rehab: No  [place] of choice provided: n/a   Evaluation: n/a    Expected Discharge date:  20  Patient expects to be discharged to:  home  Follow Up Appointment: Best Day/ Time:      Transportation provider: drove self  Transportation arrangements needed for discharge: No    Readmission Risk              Risk of Unplanned Readmission:        62             Does patient have a readmission risk score greater than 14?: Yes  If yes, follow-up appointment must be made within 7 days of discharge. Goal of Care:       Discharge Plan: Met with patient at bedside. Lives alone, independent and drives. [facility] to [facility] 2 S/P lt TMA 8-5 per Dr. Glenn Henry for osteo and dry gangrene. Spoke to Norris from [facility] and pt D/C from rehab AMA 8-. Returned to ED yesterday for lt foot pain and elevated blood sugar. Currently on IV Vanco and Zosyn and ID consulted. Has history of rt BKA.   Pt has been non compliant with diabetes and has been ambulating on lt foot despite being told to remain NWB until cleared by podiatry. Referral had been made to [facility] and spoke to [employer] and they have not been able to make contact with patient. When discharged pt wants to return home with [facility]. Continue to follow ID and podiatry plan of care and LOREE initiated.                         Electronically signed by Gilberto Hinds RN on 8/23/20 at 9:31 AM EDT

## 2020-08-23 NOTE — PROGRESS NOTES
Patient admitted to room 2008-2 From ER via bed with belongings. VSS  Patient oriented to room and call light. Assessment as charted. Admission database done. Orders and plan of care reviewed. Call light in reach. Will continue to monitor.

## 2020-08-23 NOTE — PLAN OF CARE
Problem: Falls - Risk of:  Goal: Will remain free from falls  Description: Will remain free from falls  8/23/2020 0004 by Diego Portillo RN  Outcome: Ongoing  8/23/2020 0004 by Diego Portillo RN  Outcome: Ongoing  Note: Siderails up x 2  Hourly rounding  Call light in reach  Instructed to call for assist before attempting out of bed. Remains free from falls and accidental injury at this time   Floor free from obstacles  Bed is locked and in lowest position  Adequate lighting provided  Bed alarm on, [place] star and Stay with Me signs posted     Goal: Absence of physical injury  Description: Absence of physical injury  8/23/2020 0004 by Diego Portillo RN  Outcome: Ongoing  8/23/2020 0004 by Diego Portillo RN  Outcome: Ongoing     Problem: Falls - Risk of:  Goal: Will remain free from falls  Description: Will remain free from falls  8/23/2020 0004 by Diego Portillo RN  Outcome: Ongoing  8/23/2020 0004 by Diego Portillo RN  Outcome: Ongoing  Note: Siderails up x 2  Hourly rounding  Call light in reach  Instructed to call for assist before attempting out of bed. Remains free from falls and accidental injury at this time   Floor free from obstacles  Bed is locked and in lowest position  Adequate lighting provided  Bed alarm on, [place] star and Stay with Me signs posted     Goal: Absence of physical injury  Description: Absence of physical injury  8/23/2020 0004 by Diego Portillo RN  Outcome: Ongoing  8/23/2020 0004 by Diego Portillo RN  Outcome: Ongoing     Problem: Skin Integrity:  Goal: Will show no infection signs and symptoms  Description: Will show no infection signs and symptoms  8/23/2020 0004 by Diego Portillo RN  Outcome: Ongoing  8/23/2020 0004 by Diego Portillo RN  Outcome: Ongoing  Note: Checked for incontinence every 2 hours and prn. Pericare as needed. Assisted to reposition off back frequently.   Heels off bed with pillows. Goal: Absence of new skin breakdown  Description: Absence of new skin breakdown  8/23/2020 0004 by Carissa Laughlin RN  Outcome: Ongoing  8/23/2020 0004 by Carissa Laughlin RN  Outcome: Ongoing     Problem: Pain:  Goal: Pain level will decrease  Description: Pain level will decrease  Outcome: Ongoing  Note: Pain level assessment complete.    Patient educated on pain scale and control interventions  PRN pain medication given per patient request  Patient instructed to call out with new onset of pain or unrelieved pain   Goal: Control of acute pain  Description: Control of acute pain  Outcome: Ongoing  Goal: Control of chronic pain  Description: Control of chronic pain  Outcome: Ongoing
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
## 2020-08-23 NOTE — DISCHARGE INSTR - COC
Continuity of Care Form    Patient Name: Donato Mendoza   :  1955  MRN:  [id number]    Admit date:  2020  Discharge date:  9/3/2020    Code Status Order: Full Code   Advance Directives:   [facility] Documentation     Date/Time Healthcare Directive Type of Healthcare Directive Copy in [address] Agent's Name Healthcare Agent's Phone Number    20  No, patient does not have an advance directive for healthcare treatment -- -- -- -- --          Admitting Physician:  Jc Whatley DO  PCP: Diego Portillo DO    Discharging Nurse: [facility] Unit/Room#:   Discharging Unit Phone Number: [phone]  Emergency Contact:   Extended Emergency Contact Information  Primary Emergency Contact: [facility] August  Address: [address]  Home Phone: [phone]  Relation: Parent  Secondary Emergency Contact: Teretha Kawasaki [address] Phone: [phone]  Mobile Phone: [phone]  Relation: Child    Past Surgical History:  Past Surgical History:   Procedure Laterality Date    COLONOSCOPY  2015    hyperplastic polyp    COLONOSCOPY  2017    ESOPHAGECTOMY      cancer    FOOT SURGERY Right 2016    I & D heel    FOOT SURGERY Right 2016    I & D    FRACTURE SURGERY Left 2015    humerus left, left leg    LEG AMPUTATION BELOW KNEE Right 2017    TOE AMPUTATION Right     rt 3rd through 5th digits    TOE AMPUTATION Left 2016    left foot 5th toe    TOE AMPUTATION Left 2020    FOOT TRANSMETATARSAL  AMPUTATION - AND LEFT PECUTANEOUS TENDO ACHILLES LENGTHENING performed by Deanna Macias DPM at [place] [id number]      13- with dilation    VASCULAR SURGERY Right 2017    foot guillotine amputation       Immunization History:   Immunization History   Administered Date(s) Administered    Influenza Vaccine, unspecified formulation 10/10/2016    Influenza Virus Vaccine 11/03/2014, 10/29/2015    Influenza, Hassel Scarce, IM, (6 mo and older Fluzone, Flulaval, Fluarix and 3 yrs and older Afluria) 10/10/2016    Pneumococcal Polysaccharide ([id number]) 09/05/2012, 10/29/2015    Tdap (Boostrix, Adacel) 07/01/2019       Active Problems:  Patient Active Problem List   Diagnosis Code    Type 2 diabetes mellitus with diabetic polyneuropathy, with long-term current use of insulin ([facility]) E11.42, Z79.4    Neuropathic pain, leg M79.2    Diabetic polyneuropathy ([facility]) E11.42    Allergic rhinitis J30.9    Tobacco dependence F17.200    COPD (chronic obstructive pulmonary disease) ([facility]) J44.9    Edentulous K00.0    Dysphagia R13.10    Lung nodules R91.8    Esophageal cancer ([facility]) C15.9    Fracture of humerus, left, closed S42.302A    Closed fracture of humerus S42.309A    DM type 2 with diabetic peripheral neuropathy ([facility]) E11.42    COPD without exacerbation ([facility]) J44.9    Dyslipidemia E78.5    Gastroesophageal reflux disease K21.9    Chronic pain syndrome G89.4    Marijuana use F12.90    History of colon polyps Z86.010    Cellulitis of right heel L03.115    PVD (peripheral vascular disease) ([facility]) I73.9    Functional diarrhea K59.1    Sepsis due to methicillin resistant Staphylococcus aureus (MRSA) ([facility]) A41.02    History of esophageal cancer Z85.01    Osteomyelitis, chronic, ankle or foot ([facility]) M86.679    Peripheral artery disease ([facility]) I73.9    Other pulmonary embolism without acute cor pulmonale ([facility]) I26.99    Carotid stenosis, asymptomatic, bilateral I65.23    Lower limb amputation status Z89.619    Fx humeral neck, right, closed, initial encounter S42.211A    Transient hypotension I95.9    Constipation due to opioid therapy K59.03, T40.2X5A    Acute kidney injury ([address] N17.9    Diabetic ulcer of left midfoot associated with type 2 diabetes mellitus, with fat layer exposed ([address] E11.621, X94.730    Complication of Delivery   whole    Wound Care Documentation and Therapy:  Puncture 07/29/20 Groin Right (Active)   Wound Assessment Other (Comment) 08/03/20 0400   Odalys-wound Assessment Clean;Dry;Excoriated; Intact 08/03/20 0400   Closure Other (Comment) 08/03/20 0400   Drainage Amount None 08/03/20 0400   Dressing/Treatment Other (comment) 08/03/20 0400   Dressing Changed Changed/New 07/31/20 0048   Dressing Status Clean;Dry; Intact 08/03/20 0400   Dressing Change Due 07/31/20 07/31/20 0048   Number of days: 24       Wound 02/24/20 Knee Anterior; Left scabs x2 X1\" round (Active)   Number of days: 181       Wound 07/28/20 Foot Left; Other (Comment) (Active)   Wound Diabetic 08/03/20 0400   Offloading for Diabetic Foot Ulcers Offloading boot 07/31/20 1344   Dressing Status Clean;Dry; Intact 08/23/20 0830   Dressing Changed Changed/New 08/02/20 1245   Dressing/Treatment Ace wrap 08/23/20 0830   Wound Cleansed Wound cleanser 08/01/20 0800   Dressing Change Due 08/01/20 07/31/20 1815   Wound Assessment Other (Comment) 08/23/20 0830   Drainage Amount None 08/23/20 0830   Odor Strong 08/03/20 0400   Margins Unattached edges; Undefined edges 07/31/20 1815   Odalys-wound Assessment Other (Comment) 08/01/20 0449   Culture Taken No 08/01/20 0800   Number of days: 26       Wound 07/29/20 Knee Right large round abrasion to right stump  (Active)   Wound Other 08/06/20 0709   Dressing Status Clean;Dry; Intact 08/02/20 0412   Dressing Changed Changed/New 08/07/20 1600   Dressing/Treatment Other (comment) 08/07/20 1600   Dressing Change Due 08/01/20 08/01/20 0449   Wound Assessment Other (Comment) 08/07/20 1600   Drainage Amount None 08/07/20 1600   Odor None 08/02/20 0412   Odalys-wound Assessment Other (Comment) 08/03/20 0400   Number of days: 25        Elimination:  Continence:   · Bowel:  Yes  · Bladder: Yes  Urinary Catheter: None   Colostomy/Ileostomy/Ileal Conduit: No       Date of Last BM: ***    Intake/Output Summary (Last 24 hours) at 8/23/2020 8234  Last data filed at 8/23/2020 0934  Gross per 24 hour   Intake 1307 ml   Output 1075 ml   Net 232 ml     I/O last 3 completed shifts: In: 2006 [I.V.:1307]  Out: 700 [Urine:700]    Safety Concerns: At Risk for Falls    Impairments/Disabilities:      Amputation - Right BKA, has prosthesis    Nutrition Therapy:  Current Nutrition Therapy:   - Oral Diet:  Carb Control 4 carbs/meal (1800kcals/day)    Routes of Feeding: Oral  Liquids: No Restrictions  Daily Fluid Restriction: no  Last Modified Barium Swallow with Video (Video Swallowing Test): not done    Treatments at the Time of Hospital Discharge:   Respiratory Treatments: ***  Oxygen Therapy:  is not on home oxygen therapy. Ventilator:    - No ventilator support    Rehab Therapies: Physical Therapy and Occupational Therapy  Weight Bearing Status/Restrictions: Non-weight bearing on left leg, non-compliant  Other Medical Equipment (for information only, NOT a DME order):  walker  Other Treatments: Skilled nursing assessment  Med teaching and compliance    Patient's personal belongings (please select all that are sent with patient):  None    RN SIGNATURE:  Electronically signed by Terrance Stahl RN on 9/3/20 at 10:56 AM EDT    CASE MANAGEMENT/SOCIAL WORK SECTION    Inpatient Status Date: ***    Readmission Risk Assessment Score:  Readmission Risk              Risk of Unplanned Readmission:        62           Discharging to Facility/ Agency   Name: Lissett Pinto     [phone]                ·       / signature: Electronically signed by Amol Vang RN on 8/23/20 at 9:56 AM EDT    PHYSICIAN SECTION    Prognosis: Good    Condition at Discharge: Stable    Rehab Potential (if transferring to Rehab): Good    Recommended Labs or Other Treatments After Discharge:   Continue daily dressing to left foot with 1/4 inch packing, Adaptic around surgical incision site, 4 x 4's, Kerlix, Ace.    Strict nonweightbearing to the left foot with walker. We will follow-up within 1 week of discharge with Dr. Neeta Colin at [facility] podiatry clinic. Weekly platelet count while on linezolid/Zyvox    Physician Certification: I certify the above information and transfer of Perez Paris  is necessary for the continuing treatment of the diagnosis listed and that he requires [place] for less 30 days.      Update Admission H&P: No change in H&P    PHYSICIAN SIGNATURE:  Electronically signed by Dorina Harkins MD on 8/31/20 at 7:27 AM EDT
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
## 2020-08-23 NOTE — FLOWSHEET NOTE
Patient was passive. States about his many amputations on his foot and leg. States family live in [place]. States he is ok, no major worries, fears.  shared in presence, prayers. Follow up as needed.      08/23/20 4315   Encounter Summary   Services provided to: Patient   Referral/Consult From: [facility]   (8-23-20)   Complexity of Encounter Moderate   Length of Encounter 15 minutes   Spiritual Assessment Completed Yes   Routine   Type Initial   Assessment Passive   Intervention Prayer;Explored feelings, thoughts, concerns   Outcome Expressed gratitude;Receptive

## 2020-08-23 NOTE — CONSULTS
Pharmacy Note  Vancomycin Consult - Initial Note     Lupe Barlow is a 60 y.o. male ordered Vancomycin. .  Current diagnosis for which MRSA is suspected/confirmed: TMA stump infection, cellulitis left foot, possible osteomyelitis  Vancomycin order/consult received from Dr. Martha Hanson .     Additional antimicrobials:  Zosyn    Patient Active Problem List   Diagnosis    Type 2 diabetes mellitus with diabetic polyneuropathy, with long-term current use of insulin ([facility])    Neuropathic pain, leg    Diabetic polyneuropathy ([facility])    Allergic rhinitis    Tobacco dependence    COPD (chronic obstructive pulmonary disease) ([facility])    Edentulous    Dysphagia    Lung nodules    Esophageal cancer ([facility])    Fracture of humerus, left, closed    Closed fracture of humerus    DM type 2 with diabetic peripheral neuropathy ([facility])    COPD without exacerbation ([facility])    Dyslipidemia    Gastroesophageal reflux disease    Chronic pain syndrome    Marijuana use    History of colon polyps    Cellulitis of right heel    PVD (peripheral vascular disease) ([facility])    Functional diarrhea    Sepsis due to methicillin resistant Staphylococcus aureus (MRSA) ([facility])    History of esophageal cancer    Osteomyelitis, chronic, ankle or foot ([address]    Peripheral artery disease ([address]    Other pulmonary embolism without acute cor pulmonale ([facility])    Carotid stenosis, asymptomatic, bilateral    Lower limb amputation status    Fx humeral neck, right, closed, initial encounter    Transient hypotension    Constipation due to opioid therapy    Acute kidney injury ([address]    Diabetic ulcer of left midfoot associated with type 2 diabetes mellitus, with fat layer exposed ([address]    Complication of below knee amputation stump ([facility])    COPD exacerbation ([facility])    Hyponatremia    Pain, phantom limb ([address]    Below-knee amputation of right lower extremity ([address]    Hyperglycemia    Moderate protein malnutrition ([address]    Essential hypertension    Uncontrolled type 2 diabetes mellitus with hyperglycemia ([facility]    History of pulmonary embolism - 2017    Atelectasis    Uncontrolled type 2 diabetes mellitus with foot ulcer ([facility])    Azotemia    Anemia, normocytic normochromic    Osteomyelitis of fourth phalange of left foot ([facility]    Drug-induced constipation    Osteomyelitis ([facility]    Diabetic foot infection ([facility]       Height:     Wt Readings from Last 1 Encounters:   08/22/20 168 lb (76.2 kg)     Allergies:  Gabapentin       No results found for: PROCAL  Recent Labs     08/22/20  1708   BUN 21     Recent Labs     08/22/20  1708   CREATININE 0.88     Recent Labs     08/22/20  1708   WBC 13.7*     No intake or output data in the 24 hours ending 08/23/20 0031    Temp: 98.6 F    Culture Date / Thu Patel  /  Results  8/22/20     Specimen Description  . FOOT P     Special Requests  NOT REPORTED P     Direct Exam  RARE NEUTROPHILSAbnormal   P     Direct Exam  MANY GRAM NEGATIVE RODSAbnormal   P     Direct Exam  FEW GRAM POSITIVE COCCI IN PAIRSAbnormal   P     Culture  PENDING P      8/22/20   blood x2   no growth 2 hours; pending    Actual Weight:    Wt Readings from Last 1 Encounters:   08/22/20 168 lb (76.2 kg)     CrCl based on IBW\"  94 ml/min        PLAN   Initial loading dose of 25mg/kg (max of 2500 mg) = 2000  mg   2. Vancomycin 1250 mg IV every 12 hours. 3.   Ensured BUN/sCr ordered at baseline and at least every 3rd day. 4.   ONLY for suspected pneumonia or COPD: MRSA nasal swab  is not ordered. Non respiratory infection. .    5. Trough ordered for: 8/24/20 07:00 . Trough Goals (Non-dialysis patient) Peaks are not routinely recommended. 10-20 mcg/mL for mild skin/soft tissue infections or UTI.  15-20 mcg/mL is the target trough for all other indications that MRSA infection is suspected. TROUGH TIMING: (Additional levels drawn based on renal function and/or clinical response).   Dosing interval Timing of Trough   Every 8 hr, 12 hr, or 18 hr regimen Prior to the 4th dose  Twice weekly troughs for every 8 hour dosing   Every 24 hr regimen Prior to the 3rd or 4th dose   Every 36 hr regimen Prior to the 3rd dose           Thank you for the consult. Pharmacy will continue to follow.   RUTH SILVESTRE RPh/Asaf  8/23/2020  12:31 AM

## 2020-08-23 NOTE — PLAN OF CARE
Problem: Falls - Risk of:  Goal: Will remain free from falls  Description: Will remain free from falls  Outcome: Ongoing  Note: Siderails up x 2  Hourly rounding  Call light in reach  Instructed to call for assist before attempting out of bed. Remains free from falls and accidental injury at this time   Floor free from obstacles  Bed is locked and in lowest position  Adequate lighting provided  Bed alarm on, [place] star and Stay with Me signs posted      Goal: Absence of physical injury  Description: Absence of physical injury  Outcome: Ongoing     Problem: Skin Integrity:  Goal: Will show no infection signs and symptoms  Description: Will show no infection signs and symptoms  Outcome: Ongoing  Goal: Absence of new skin breakdown  Description: Absence of new skin breakdown  Outcome: Ongoing     Problem: Pain:  Description: Pain management should include both nonpharmacologic and pharmacologic interventions. Goal: Pain level will decrease  Description: Pain level will decrease  Outcome: Ongoing  Note: Pain level assessment complete.   Patient educated on pain scale and control interventions  PRN pain medication given per patient request  Patient instructed to call out with new onset of pain or unrelieved pain    Goal: Control of acute pain  Description: Control of acute pain  Outcome: Ongoing  Goal: Control of chronic pain  Description: Control of chronic pain  Outcome: Ongoing

## 2020-08-23 NOTE — PROGRESS NOTES
Progress Note  Podiatric Medicine and Surgery     Subjective     CC: TMA stump infection, left foot    Patient seen and examined at bedside with ID team.  Afebrile, vital signs stable   Patient continues to be difficult with staff, refusing COVID test.   At my time of evaluation patient agrees to COVID test and surgery   Plan for OR tomorrow at 5:30pm    HPI :  Glendy Leone is a 60 y.o. male seen at [facility] for surgical wound dehiscence and worsening infection of TMA stump of the left foot. Patient underwent TMA with flap closure of left foot on 08/05/2020. Patient was then admitted to SNF however after discharge from hospital patient never followed up with our clinic. He has not received any wound care or evaluation of the TMA stump since his discharge from the hospital.  Patient signed himself out from the SNF [facility] on Wednesday, 08/19/2020. Due to [place] signout patient was not given any medications or necessary supplies. Patient states he has been walking on the left lower extremity with a posterior splint intact despite nonweightbearing instruction. Patient relates \" not feeling so well\" the past couple days along with an increase in pain and malodor coming from the left TMA stump which prompted him to present to the ED today. Upon infectious work-up in the ED patient was found to have a blood glucose of 484 mg/dL, WBC of 13.7, ESR of 74. X-rays of the left foot were also obtained which demonstrated cortical irregularities to the distal third metatarsal suspicious for early osteomyelitis, small pocket of soft tissue emphysema dorsal distal aspect of the foot consistent with gas-forming infection. ROS: Denies N/V/F/C/SOB/CP. Otherwise negative except at stated in the HPI.      Medications:  Scheduled Meds:   vancomycin  1,250 mg Intravenous Q12H    vancomycin (VANCOCIN) intermittent dosing (placeholder)   Other RX Placeholder    insulin lispro  0-18 Units Subcutaneous TID WC    insulin lispro 0-9 Units Subcutaneous Nightly    apixaban  5 mg Oral BID    aspirin  81 mg Oral Daily    atorvastatin  40 mg Oral Daily    dilTIAZem  60 mg Oral 4x Daily    famotidine  20 mg Oral BID    ipratropium-albuterol  3 mL Inhalation Q4H WA    metFORMIN  500 mg Oral BID WC    nortriptyline  50 mg Oral Nightly    sodium chloride flush  10 mL Intravenous 2 times per day    piperacillin-tazobactam  3.375 g Intravenous Q8H    insulin glargine  70 Units Subcutaneous Nightly       Continuous Infusions:   sodium chloride      dextrose         PRN Meds:sodium chloride flush, potassium chloride **OR** potassium alternative oral replacement **OR** potassium chloride, magnesium sulfate, acetaminophen **OR** acetaminophen, polyethylene glycol, promethazine **OR** ondansetron, nicotine, oxyCODONE-acetaminophen, glucose, dextrose, glucagon (rDNA), dextrose    Objective     Vitals:  Patient Vitals for the past 8 hrs:   BP Temp Temp src Pulse Resp SpO2 Weight   20 0820 (!) 142/71 98.1 °F (36.7 °C) Oral 96 20 95 % --   20 0341 (!) 128/58 97.9 °F (36.6 °C) Oral 83 18 96 % --   20 0319 -- -- -- -- -- -- 168 lb 4.8 oz (76.3 kg)     Average, Min, and Max for last 24 hours Vitals:  TEMPERATURE:  Temp  Av.2 °F (36.8 °C)  Min: 97.9 °F (36.6 °C)  Max: 98.6 °F (37 °C)    RESPIRATIONS RANGE: Resp  Av  Min: 16  Max: 20    PULSE RANGE: Pulse  Av  Min: 83  Max: 97    BLOOD PRESSURE RANGE:  Systolic (54NJK), ULE:211 , Min:121 , APB:610   ; Diastolic (84IHG), TPD:25, Min:58, Max:73      PULSE OXIMETRY RANGE: SpO2  Av.6 %  Min: 93 %  Max: 96 %    I/O last 3 completed shifts:   In: 7962 [I.V.:1307]  Out: 700 [Urine:700]    CBC:  Recent Labs     20  1708 20  0550   WBC 13.7* 12.6*   HGB 10.9* 10.1*   HCT 35.4* 33.9*   * 420   CRP 27.4*  --         BMP:  Recent Labs     20  1708 20  0550   * 139   K 4.4 3.6*   CL 96* 102   CO2 26 28   BUN 21 19   CREATININE 0.88 0.84 GLUCOSE 484* 241*   CALCIUM 9.1 8.9        Coags:  No results for input(s): APTT, PROT, INR in the last 72 hours. Lab Results   Component Value Date    SEDRATE 74 (H) 08/22/2020     Recent Labs     08/22/20  1708   CRP 27.4*       Physical Exam:  Right-sided BKA     Vascular: DP and PT pulses are palpable. CFT brisk to flap edges. Hair growth is absent to the level of the digits. Mild nonpitting edema distal stump.       Neuro: Saph/sural/SP/DP/plantar sensation absent to light touch.     Musculoskeletal: Muscle strength is adequate ROM, adequate strength to all lower extremity muscle groups. Compartments are soft compressible. gross deformity is present with right-sided BKA and left TMA stump. No pain with compression of posterior calf.     Dermatologic: Full-thickness surgical wound dehiscence with exposed tendon and bone at the level of the distal TMA stump. Measures approximately 11.3cm x5.5cm x 3cm. Base is fibrotic. Mild erythema localized to distal forefoot. Scant purulent drainage noted with associated mal odor. The wound does probe to bone as well as sinus tract plantarly and dorsally. No fluctuance or obvious drainable fluid collection. Clinical Images:          Imaging:   XR FOOT LEFT (MIN 3 VIEWS)   Final Result   Skin defect with soft tissue swelling and lucencies of the amputation stump   likely representing cellulitis with gas-forming organisms. Underlying 3rd   metatarsal shows subtle distal marginal irregularity on the oblique view. This could be accentuated by the soft tissue lucencies nonetheless concerning   for osteomyelitis. Cultures:      Culture, Anaerobic and Aerobic [[id number]]  (Abnormal)  Collected: 08/22/20 1830    Order Status: Completed  Specimen: Foot  Updated: 08/22/20 2340     Specimen Description  . FOOT     Special Requests  NOT REPORTED     Direct Exam  RARE NEUTROPHILSAbnormal        MANY GRAM NEGATIVE RODSAbnormal        FEW GRAM POSITIVE COCCI IN

## 2020-08-24 ENCOUNTER — ANESTHESIA EVENT (OUTPATIENT)
Dept: OPERATING ROOM | Age: 65
DRG: 475 | End: 2020-08-24
Payer: MEDICARE

## 2020-08-24 ENCOUNTER — APPOINTMENT (OUTPATIENT)
Dept: GENERAL RADIOLOGY | Age: 65
DRG: 475 | End: 2020-08-24
Payer: MEDICARE

## 2020-08-24 ENCOUNTER — ANESTHESIA (OUTPATIENT)
Dept: OPERATING ROOM | Age: 65
DRG: 475 | End: 2020-08-24
Payer: MEDICARE

## 2020-08-24 VITALS — SYSTOLIC BLOOD PRESSURE: 83 MMHG | TEMPERATURE: 98.1 F | DIASTOLIC BLOOD PRESSURE: 54 MMHG | OXYGEN SATURATION: 100 %

## 2020-08-24 LAB
GLUCOSE BLD-MCNC: 131 MG/DL (ref 75–110)
GLUCOSE BLD-MCNC: 209 MG/DL (ref 75–110)
GLUCOSE BLD-MCNC: 250 MG/DL (ref 75–110)
GLUCOSE BLD-MCNC: 358 MG/DL (ref 75–110)
GLUCOSE BLD-MCNC: 61 MG/DL (ref 75–110)
GLUCOSE BLD-MCNC: 70 MG/DL (ref 75–110)
GLUCOSE BLD-MCNC: 75 MG/DL (ref 75–110)
VANCOMYCIN TROUGH DATE LAST DOSE: NORMAL
VANCOMYCIN TROUGH DOSE AMOUNT: NORMAL
VANCOMYCIN TROUGH TIME LAST DOSE: NORMAL
VANCOMYCIN TROUGH: 16.4 UG/ML (ref 10–20)

## 2020-08-24 PROCEDURE — 94640 AIRWAY INHALATION TREATMENT: CPT

## 2020-08-24 PROCEDURE — 2580000003 HC RX 258: Performed by: STUDENT IN AN ORGANIZED HEALTH CARE EDUCATION/TRAINING PROGRAM

## 2020-08-24 PROCEDURE — 3E0T3BZ INTRODUCTION OF ANESTHETIC AGENT INTO PERIPHERAL NERVES AND PLEXI, PERCUTANEOUS APPROACH: ICD-10-PCS | Performed by: ANESTHESIOLOGY

## 2020-08-24 PROCEDURE — 6370000000 HC RX 637 (ALT 250 FOR IP): Performed by: STUDENT IN AN ORGANIZED HEALTH CARE EDUCATION/TRAINING PROGRAM

## 2020-08-24 PROCEDURE — 36415 COLL VENOUS BLD VENIPUNCTURE: CPT

## 2020-08-24 PROCEDURE — 2500000003 HC RX 250 WO HCPCS: Performed by: ANESTHESIOLOGY

## 2020-08-24 PROCEDURE — 3700000000 HC ANESTHESIA ATTENDED CARE: Performed by: PODIATRIST

## 2020-08-24 PROCEDURE — 1200000000 HC SEMI PRIVATE

## 2020-08-24 PROCEDURE — 7100000001 HC PACU RECOVERY - ADDTL 15 MIN: Performed by: PODIATRIST

## 2020-08-24 PROCEDURE — 73620 X-RAY EXAM OF FOOT: CPT

## 2020-08-24 PROCEDURE — 87075 CULTR BACTERIA EXCEPT BLOOD: CPT

## 2020-08-24 PROCEDURE — 99232 SBSQ HOSP IP/OBS MODERATE 35: CPT | Performed by: INTERNAL MEDICINE

## 2020-08-24 PROCEDURE — 0Y6N0ZB DETACHMENT AT LEFT FOOT, PARTIAL 2ND RAY, OPEN APPROACH: ICD-10-PCS | Performed by: PODIATRIST

## 2020-08-24 PROCEDURE — 3600000002 HC SURGERY LEVEL 2 BASE: Performed by: PODIATRIST

## 2020-08-24 PROCEDURE — 88311 DECALCIFY TISSUE: CPT

## 2020-08-24 PROCEDURE — 7100000000 HC PACU RECOVERY - FIRST 15 MIN: Performed by: PODIATRIST

## 2020-08-24 PROCEDURE — 87077 CULTURE AEROBIC IDENTIFY: CPT

## 2020-08-24 PROCEDURE — 6370000000 HC RX 637 (ALT 250 FOR IP): Performed by: INTERNAL MEDICINE

## 2020-08-24 PROCEDURE — 87176 TISSUE HOMOGENIZATION CULTR: CPT

## 2020-08-24 PROCEDURE — 86140 C-REACTIVE PROTEIN: CPT

## 2020-08-24 PROCEDURE — 87070 CULTURE OTHR SPECIMN AEROBIC: CPT

## 2020-08-24 PROCEDURE — 2580000003 HC RX 258: Performed by: ANESTHESIOLOGY

## 2020-08-24 PROCEDURE — 87186 SC STD MICRODIL/AGAR DIL: CPT

## 2020-08-24 PROCEDURE — 3600000012 HC SURGERY LEVEL 2 ADDTL 15MIN: Performed by: PODIATRIST

## 2020-08-24 PROCEDURE — 3700000001 HC ADD 15 MINUTES (ANESTHESIA): Performed by: PODIATRIST

## 2020-08-24 PROCEDURE — 94761 N-INVAS EAR/PLS OXIMETRY MLT: CPT

## 2020-08-24 PROCEDURE — 6360000002 HC RX W HCPCS: Performed by: STUDENT IN AN ORGANIZED HEALTH CARE EDUCATION/TRAINING PROGRAM

## 2020-08-24 PROCEDURE — 0Y6N0Z9 DETACHMENT AT LEFT FOOT, PARTIAL 1ST RAY, OPEN APPROACH: ICD-10-PCS | Performed by: PODIATRIST

## 2020-08-24 PROCEDURE — 6360000002 HC RX W HCPCS: Performed by: INTERNAL MEDICINE

## 2020-08-24 PROCEDURE — 87205 SMEAR GRAM STAIN: CPT

## 2020-08-24 PROCEDURE — 0Y6N0ZD DETACHMENT AT LEFT FOOT, PARTIAL 4TH RAY, OPEN APPROACH: ICD-10-PCS | Performed by: PODIATRIST

## 2020-08-24 PROCEDURE — 88304 TISSUE EXAM BY PATHOLOGIST: CPT

## 2020-08-24 PROCEDURE — 82947 ASSAY GLUCOSE BLOOD QUANT: CPT

## 2020-08-24 PROCEDURE — 2709999900 HC NON-CHARGEABLE SUPPLY: Performed by: PODIATRIST

## 2020-08-24 PROCEDURE — 0Y6N0ZF DETACHMENT AT LEFT FOOT, PARTIAL 5TH RAY, OPEN APPROACH: ICD-10-PCS | Performed by: PODIATRIST

## 2020-08-24 PROCEDURE — 80202 ASSAY OF VANCOMYCIN: CPT

## 2020-08-24 PROCEDURE — 0Y6N0ZC DETACHMENT AT LEFT FOOT, PARTIAL 3RD RAY, OPEN APPROACH: ICD-10-PCS | Performed by: PODIATRIST

## 2020-08-24 PROCEDURE — 73630 X-RAY EXAM OF FOOT: CPT

## 2020-08-24 PROCEDURE — 2500000003 HC RX 250 WO HCPCS: Performed by: PODIATRIST

## 2020-08-24 PROCEDURE — 6360000002 HC RX W HCPCS: Performed by: ANESTHESIOLOGY

## 2020-08-24 PROCEDURE — 2580000003 HC RX 258: Performed by: INTERNAL MEDICINE

## 2020-08-24 PROCEDURE — 86403 PARTICLE AGGLUT ANTBDY SCRN: CPT

## 2020-08-24 PROCEDURE — 2720000010 HC SURG SUPPLY STERILE: Performed by: PODIATRIST

## 2020-08-24 RX ORDER — FENTANYL CITRATE 50 UG/ML
INJECTION, SOLUTION INTRAMUSCULAR; INTRAVENOUS PRN
Status: DISCONTINUED | OUTPATIENT
Start: 2020-08-24 | End: 2020-08-24 | Stop reason: SDUPTHER

## 2020-08-24 RX ORDER — LIDOCAINE HYDROCHLORIDE 20 MG/ML
INJECTION, SOLUTION EPIDURAL; INFILTRATION; INTRACAUDAL; PERINEURAL PRN
Status: DISCONTINUED | OUTPATIENT
Start: 2020-08-24 | End: 2020-08-24 | Stop reason: SDUPTHER

## 2020-08-24 RX ORDER — FENTANYL CITRATE 50 UG/ML
50 INJECTION, SOLUTION INTRAMUSCULAR; INTRAVENOUS EVERY 5 MIN PRN
Status: DISCONTINUED | OUTPATIENT
Start: 2020-08-24 | End: 2020-08-24 | Stop reason: HOSPADM

## 2020-08-24 RX ORDER — PHENYLEPHRINE HCL IN 0.9% NACL 1 MG/10 ML
SYRINGE (ML) INTRAVENOUS PRN
Status: DISCONTINUED | OUTPATIENT
Start: 2020-08-24 | End: 2020-08-24 | Stop reason: SDUPTHER

## 2020-08-24 RX ORDER — FENTANYL CITRATE 50 UG/ML
25 INJECTION, SOLUTION INTRAMUSCULAR; INTRAVENOUS EVERY 5 MIN PRN
Status: DISCONTINUED | OUTPATIENT
Start: 2020-08-24 | End: 2020-08-24 | Stop reason: HOSPADM

## 2020-08-24 RX ORDER — ONDANSETRON 2 MG/ML
INJECTION INTRAMUSCULAR; INTRAVENOUS PRN
Status: DISCONTINUED | OUTPATIENT
Start: 2020-08-24 | End: 2020-08-24 | Stop reason: SDUPTHER

## 2020-08-24 RX ORDER — HYDROMORPHONE HCL 110MG/55ML
0.25 PATIENT CONTROLLED ANALGESIA SYRINGE INTRAVENOUS EVERY 5 MIN PRN
Status: DISCONTINUED | OUTPATIENT
Start: 2020-08-24 | End: 2020-08-24 | Stop reason: HOSPADM

## 2020-08-24 RX ORDER — HYDROMORPHONE HCL 110MG/55ML
0.5 PATIENT CONTROLLED ANALGESIA SYRINGE INTRAVENOUS EVERY 5 MIN PRN
Status: DISCONTINUED | OUTPATIENT
Start: 2020-08-24 | End: 2020-08-24 | Stop reason: HOSPADM

## 2020-08-24 RX ORDER — SUCCINYLCHOLINE/SOD CL,ISO/PF 100 MG/5ML
SYRINGE (ML) INTRAVENOUS PRN
Status: DISCONTINUED | OUTPATIENT
Start: 2020-08-24 | End: 2020-08-24 | Stop reason: SDUPTHER

## 2020-08-24 RX ORDER — ONDANSETRON 2 MG/ML
4 INJECTION INTRAMUSCULAR; INTRAVENOUS
Status: DISCONTINUED | OUTPATIENT
Start: 2020-08-24 | End: 2020-08-24 | Stop reason: HOSPADM

## 2020-08-24 RX ORDER — PROPOFOL 10 MG/ML
INJECTION, EMULSION INTRAVENOUS PRN
Status: DISCONTINUED | OUTPATIENT
Start: 2020-08-24 | End: 2020-08-24 | Stop reason: SDUPTHER

## 2020-08-24 RX ORDER — SODIUM CHLORIDE 9 MG/ML
INJECTION, SOLUTION INTRAVENOUS CONTINUOUS PRN
Status: DISCONTINUED | OUTPATIENT
Start: 2020-08-24 | End: 2020-08-24 | Stop reason: SDUPTHER

## 2020-08-24 RX ADMIN — IPRATROPIUM BROMIDE AND ALBUTEROL SULFATE 3 ML: .5; 3 SOLUTION RESPIRATORY (INHALATION) at 11:14

## 2020-08-24 RX ADMIN — Medication 100 MCG: at 18:34

## 2020-08-24 RX ADMIN — SODIUM CHLORIDE: 9 INJECTION, SOLUTION INTRAVENOUS at 18:07

## 2020-08-24 RX ADMIN — ONDANSETRON 4 MG: 2 INJECTION, SOLUTION INTRAMUSCULAR; INTRAVENOUS at 18:36

## 2020-08-24 RX ADMIN — KETOROLAC TROMETHAMINE 15 MG: 15 INJECTION, SOLUTION INTRAMUSCULAR; INTRAVENOUS at 11:53

## 2020-08-24 RX ADMIN — FAMOTIDINE 20 MG: 20 TABLET ORAL at 08:54

## 2020-08-24 RX ADMIN — Medication 100 MG: at 18:24

## 2020-08-24 RX ADMIN — DILTIAZEM HYDROCHLORIDE 60 MG: 60 TABLET, FILM COATED ORAL at 08:54

## 2020-08-24 RX ADMIN — OXYCODONE HYDROCHLORIDE AND ACETAMINOPHEN 1 TABLET: 5; 325 TABLET ORAL at 22:41

## 2020-08-24 RX ADMIN — SODIUM CHLORIDE: 9 INJECTION, SOLUTION INTRAVENOUS at 18:16

## 2020-08-24 RX ADMIN — OXYCODONE HYDROCHLORIDE AND ACETAMINOPHEN 1 TABLET: 5; 325 TABLET ORAL at 02:20

## 2020-08-24 RX ADMIN — SODIUM CHLORIDE, PRESERVATIVE FREE 10 ML: 5 INJECTION INTRAVENOUS at 22:23

## 2020-08-24 RX ADMIN — NORTRIPTYLINE HYDROCHLORIDE 50 MG: 25 CAPSULE ORAL at 22:16

## 2020-08-24 RX ADMIN — PIPERACILLIN AND TAZOBACTAM 3.38 G: 3; .375 INJECTION, POWDER, LYOPHILIZED, FOR SOLUTION INTRAVENOUS at 12:35

## 2020-08-24 RX ADMIN — PIPERACILLIN AND TAZOBACTAM 3.38 G: 3; .375 INJECTION, POWDER, LYOPHILIZED, FOR SOLUTION INTRAVENOUS at 06:18

## 2020-08-24 RX ADMIN — FAMOTIDINE 20 MG: 20 TABLET ORAL at 22:16

## 2020-08-24 RX ADMIN — INSULIN GLARGINE 70 UNITS: 100 INJECTION, SOLUTION SUBCUTANEOUS at 22:19

## 2020-08-24 RX ADMIN — SODIUM CHLORIDE: 9 INJECTION, SOLUTION INTRAVENOUS at 02:15

## 2020-08-24 RX ADMIN — Medication 200 MCG: at 18:52

## 2020-08-24 RX ADMIN — KETOROLAC TROMETHAMINE 15 MG: 15 INJECTION, SOLUTION INTRAMUSCULAR; INTRAVENOUS at 06:18

## 2020-08-24 RX ADMIN — IPRATROPIUM BROMIDE AND ALBUTEROL SULFATE 3 ML: .5; 3 SOLUTION RESPIRATORY (INHALATION) at 07:40

## 2020-08-24 RX ADMIN — POLYETHYLENE GLYCOL (3350) 17 G: 17 POWDER, FOR SOLUTION ORAL at 22:16

## 2020-08-24 RX ADMIN — LIDOCAINE HYDROCHLORIDE 100 MG: 20 INJECTION, SOLUTION EPIDURAL; INFILTRATION; INTRACAUDAL; PERINEURAL at 18:24

## 2020-08-24 RX ADMIN — VANCOMYCIN HYDROCHLORIDE 1250 MG: 1.25 INJECTION, POWDER, LYOPHILIZED, FOR SOLUTION INTRAVENOUS at 08:54

## 2020-08-24 RX ADMIN — DILTIAZEM HYDROCHLORIDE 60 MG: 60 TABLET, FILM COATED ORAL at 22:38

## 2020-08-24 RX ADMIN — Medication 100 MCG: at 18:33

## 2020-08-24 RX ADMIN — PIPERACILLIN AND TAZOBACTAM 3.38 G: 3; .375 INJECTION, POWDER, LYOPHILIZED, FOR SOLUTION INTRAVENOUS at 22:19

## 2020-08-24 RX ADMIN — INSULIN LISPRO 5 UNITS: 100 INJECTION, SOLUTION INTRAVENOUS; SUBCUTANEOUS at 22:19

## 2020-08-24 RX ADMIN — Medication 100 MCG: at 18:24

## 2020-08-24 RX ADMIN — OXYCODONE HYDROCHLORIDE AND ACETAMINOPHEN 1 TABLET: 5; 325 TABLET ORAL at 08:54

## 2020-08-24 RX ADMIN — IPRATROPIUM BROMIDE AND ALBUTEROL SULFATE 3 ML: .5; 3 SOLUTION RESPIRATORY (INHALATION) at 15:12

## 2020-08-24 RX ADMIN — PROPOFOL 180 MG: 10 INJECTION, EMULSION INTRAVENOUS at 18:24

## 2020-08-24 ASSESSMENT — PULMONARY FUNCTION TESTS
PIF_VALUE: 20
PIF_VALUE: 21
PIF_VALUE: 20
PIF_VALUE: 1
PIF_VALUE: 15
PIF_VALUE: 15
PIF_VALUE: 0
PIF_VALUE: 21
PIF_VALUE: 1
PIF_VALUE: 22
PIF_VALUE: 0
PIF_VALUE: 20
PIF_VALUE: 22
PIF_VALUE: 1
PIF_VALUE: 15
PIF_VALUE: 20
PIF_VALUE: 22
PIF_VALUE: 20
PIF_VALUE: 20
PIF_VALUE: 18
PIF_VALUE: 21
PIF_VALUE: 21
PIF_VALUE: 20
PIF_VALUE: 19
PIF_VALUE: 19
PIF_VALUE: 21
PIF_VALUE: 21
PIF_VALUE: 22
PIF_VALUE: 20
PIF_VALUE: 21
PIF_VALUE: 23
PIF_VALUE: 0
PIF_VALUE: 20
PIF_VALUE: 21
PIF_VALUE: 22
PIF_VALUE: 1
PIF_VALUE: 22
PIF_VALUE: 22
PIF_VALUE: 18
PIF_VALUE: 21
PIF_VALUE: 22
PIF_VALUE: 20
PIF_VALUE: 20
PIF_VALUE: 22
PIF_VALUE: 22
PIF_VALUE: 20
PIF_VALUE: 20
PIF_VALUE: 22
PIF_VALUE: 20
PIF_VALUE: 20
PIF_VALUE: 21
PIF_VALUE: 20
PIF_VALUE: 22
PIF_VALUE: 19
PIF_VALUE: 21
PIF_VALUE: 22
PIF_VALUE: 22
PIF_VALUE: 1
PIF_VALUE: 21
PIF_VALUE: 21
PIF_VALUE: 22
PIF_VALUE: 20
PIF_VALUE: 22
PIF_VALUE: 19
PIF_VALUE: 0
PIF_VALUE: 20
PIF_VALUE: 19
PIF_VALUE: 20
PIF_VALUE: 21
PIF_VALUE: 20
PIF_VALUE: 22
PIF_VALUE: 20
PIF_VALUE: 22
PIF_VALUE: 15
PIF_VALUE: 21
PIF_VALUE: 22
PIF_VALUE: 20
PIF_VALUE: 21
PIF_VALUE: 21
PIF_VALUE: 22
PIF_VALUE: 16
PIF_VALUE: 22

## 2020-08-24 ASSESSMENT — PAIN DESCRIPTION - FREQUENCY
FREQUENCY: CONTINUOUS

## 2020-08-24 ASSESSMENT — PAIN DESCRIPTION - LOCATION
LOCATION: FOOT

## 2020-08-24 ASSESSMENT — PAIN SCALES - GENERAL
PAINLEVEL_OUTOF10: 8
PAINLEVEL_OUTOF10: 8
PAINLEVEL_OUTOF10: 6
PAINLEVEL_OUTOF10: 8
PAINLEVEL_OUTOF10: 8
PAINLEVEL_OUTOF10: 5
PAINLEVEL_OUTOF10: 0
PAINLEVEL_OUTOF10: 8
PAINLEVEL_OUTOF10: 0

## 2020-08-24 ASSESSMENT — ENCOUNTER SYMPTOMS
RESPIRATORY NEGATIVE: 1
GASTROINTESTINAL NEGATIVE: 1
ALLERGIC/IMMUNOLOGIC NEGATIVE: 1

## 2020-08-24 ASSESSMENT — PAIN DESCRIPTION - ORIENTATION
ORIENTATION: LEFT

## 2020-08-24 ASSESSMENT — PAIN DESCRIPTION - PAIN TYPE
TYPE: ACUTE PAIN
TYPE: SURGICAL PAIN
TYPE: ACUTE PAIN

## 2020-08-24 ASSESSMENT — PAIN DESCRIPTION - ONSET: ONSET: ON-GOING

## 2020-08-24 ASSESSMENT — LIFESTYLE VARIABLES: SMOKING_STATUS: 1

## 2020-08-24 ASSESSMENT — PAIN DESCRIPTION - DESCRIPTORS
DESCRIPTORS: ACHING;SORE
DESCRIPTORS: PINS AND NEEDLES
DESCRIPTORS: PINS AND NEEDLES

## 2020-08-24 NOTE — PROGRESS NOTES
Patient's daughter called upset d/t writer not being able to give information without [employer] password. Daughter asked to speak with supervisor.  HA Arceo notified of call
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
## 2020-08-24 NOTE — PROGRESS NOTES
[facility]  Progress Note    Eilleen Rinne  MRN: [id number]  Date: 8/24/2020  LOS: 2     Reason for F/U :   Left TMA stump infection    Impression :   1. Recent left transmetatarsal amputation 8/5/2020  2. Trans-metatarsal stump infection with wound dehiscence and possible underlying osteomyelitis of the metatarsals  · Status post bedside wound debridement 8/22/2020  3. Peripheral arterial disease status post revascularization  4. 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 · The patient continues on Zosyn and vancomycin for now  · It is my understanding that there is consideration for discharge tomorrow with home care as he is not going to the extended care facility. · Depending on the operative findings and surgery I will decide whether the patient will need IV antimicrobial therapy versus oral the time of discharge    Infection Control Recommendations:   Universal precautions    Discharge Planning:   Estimated Length of IV antimicrobials: To be determined  Patient will need Midline Catheter Insertion/ PICC line Insertion: No  Patient will need: Home IV , [place],  SNF,  LTAC: Undetermined  Patient willneed outpatient wound care: No    Medical Decision making / Summary of Stay:   Eilleen Rinne is a 60y.o.-year-old male who was initially admitted on 8/22/2020. Neel Calderon has a history of peripheral arterial disease and has undergone revascularization procedures on the left in the past, esophageal cancer, diabetes mellitus with associated neuropathy and he was recently hospitalized with left foot plantar ulcerations of the great toe with associated gangrene and cellulitis of the foot. The patient subsequently underwent transmetatarsal amputation of the left foot with Achilles tendon lengthening on 8/5/2020.   The patient was discharged to a rehabilitation facility on 8/7/2020 on oral antimicrobial therapy with Augmentin and doxycycline for a week. The patient states that he never had any wound care done in the time since his discharge and he started to feel \"funny\" though he cannot really specify what this means exactly and ended up going back into the emergency room. The patient had dressing change done which showed devitalized tissue as well as an x-ray that showed some gaseous soft tissue changes and the patient was seen by the podiatry service had a debridement done at bedside. I was asked to evaluate if there was concern for osteomyelitis as the open wounds did have 3 metatarsal bones exposed. Current evaluation:2020    BP (!) 146/64   Pulse 90   Temp 97.7 °F (36.5 °C) (Oral)   Resp 18   Ht 6' (1.829 m)   Wt 170 lb 3.2 oz (77.2 kg)   SpO2 93%   BMI 23.08 kg/m²     Temperature Range: Temp: 97.7 °F (36.5 °C) Temp  Av.8 °F (36.6 °C)  Min: 97.5 °F (36.4 °C)  Max: 98.2 °F (36.8 °C)  The patient is seen and evaluated at bedside he is awake and alert in no acute distress. No subjective fever or chills. No abdominal pain nausea vomiting or diarrhea. Review of Systems   Constitutional: Negative. Respiratory: Negative. Cardiovascular: Negative. Gastrointestinal: Negative. Genitourinary: Negative. Musculoskeletal: Negative. Allergic/Immunologic: Negative. Neurological: Negative. Physical Examination :     Physical Exam  Constitutional:       Appearance: He is well-developed. HENT:      Head: Normocephalic and atraumatic. Neck:      Musculoskeletal: Normal range of motion and neck supple. Cardiovascular:      Rate and Rhythm: Normal rate. Heart sounds: Normal heart sounds. No friction rub. No gallop. Pulmonary:      Effort: Pulmonary effort is normal.      Breath sounds: Normal breath sounds. No wheezing. Abdominal:      General: Bowel sounds are normal.      Palpations: Abdomen is soft. There is no mass. Tenderness:  There is no abdominal tenderness. Musculoskeletal:      Comments: Right below the knee amputation   Lymphadenopathy:      Cervical: No cervical adenopathy. Skin:     General: Skin is warm and dry. Comments: There is a dressing in the left foot   Neurological:      Mental Status: He is alert and oriented to person, place, and time. Laboratory data:   I have independently reviewed the followinglabs:  CBC with Differential:   Recent Labs     08/22/20  1708 08/23/20  0550   WBC 13.7* 12.6*   HGB 10.9* 10.1*   HCT 35.4* 33.9*   * 420   LYMPHOPCT 17*  --    MONOPCT 7  --      BMP:   Recent Labs     08/22/20  1708 08/23/20  0550   * 139   K 4.4 3.6*   CL 96* 102   CO2 26 28   BUN 21 19   CREATININE 0.88 0.84     Hepatic Function Panel: No results for input(s): PROT, LABALBU, BILIDIR, IBILI, BILITOT, ALKPHOS, ALT, AST in the last 72 hours. No results found for: PROCAL  Lab Results   Component Value Date    CRP 27.4 08/22/2020    CRP 72.8 08/02/2020    CRP 64.5 07/31/2020     Lab Results   Component Value Date    SEDRATE 74 (H) 08/22/2020       Lab Results   Component Value Date    DDIMER 0.39 06/29/2020    DDIMER 1.63 12/20/2017    DDIMER 0.68 12/25/2011    FERRITIN 64 01/25/2014       Recent Labs     08/24/20  0714   [facility] 16.4       Imaging Studies:   No new imaging    Cultures:     Culture, Blood 1 [[id number]]   Collected: 08/22/20 1711    Order Status: Completed  Specimen: Blood  Updated: 08/24/20 1153     Specimen Description  . BLOOD     Special Requests  R AC 6 ML     Culture  NO GROWTH 2 DAYS    Culture, Blood 1 [[id number]]   Collected: 08/22/20 1711    Order Status: Completed  Specimen: Blood  Updated: 08/24/20 1153     Specimen Description  . BLOOD     Special Requests  L HAND 4 ML     Culture  NO GROWTH 2 DAYS    Culture, Anaerobic and Aerobic [[id number]]  (Abnormal)  Collected: 08/22/20 1830    Order Status: Completed  Specimen: Foot  Updated: 08/23/20 2015     Specimen Description  . FOOT     Special Requests  NOT REPORTED     Direct Exam  RARE NEUTROPHILSAbnormal        MANY GRAM NEGATIVE RODSAbnormal        FEW GRAM POSITIVE COCCI IN PAIRSAbnormal       Culture  CULTURE IN PROGRESS          Medications:      vancomycin  1,250 mg Intravenous Q12H    vancomycin (VANCOCIN) intermittent dosing (placeholder)   Other RX Placeholder    insulin lispro  0-18 Units Subcutaneous TID WC    insulin lispro  0-9 Units Subcutaneous Nightly    apixaban  5 mg Oral BID    aspirin  81 mg Oral Daily    atorvastatin  40 mg Oral Daily    dilTIAZem  60 mg Oral 4x Daily    famotidine  20 mg Oral BID    ipratropium-albuterol  3 mL Inhalation Q4H WA    metFORMIN  500 mg Oral BID WC    nortriptyline  50 mg Oral Nightly    sodium chloride flush  10 mL Intravenous 2 times per day    piperacillin-tazobactam  3.375 g Intravenous Q8H    insulin glargine  70 Units Subcutaneous Nightly           [facility]  Prem Canada MD  [employer] messaging  OFFICE: [phone]      Electronically signed by Prem Canada MD on 8/24/2020 at 11:57 AM  Thank you for allowing us to participate in the care of this patient. Please call with questions. This note iscreated with the assistance of a speech recognition program.  While intending to generate a document that actually reflects the content of the visit, the document can still have some errors including those of syntax andsound a like substitutions which may escape proof reading. In such instances, actual meaning can be extrapolated by contextual diversion.

## 2020-08-24 NOTE — BRIEF OP NOTE
PODIATRY BRIEF OP NOTE    PATIENT NAME: Sunil Mendoza  YOB: 1955  -  60 y.o. male  MRN: [id number]  DATE: 8/24/2020  BILLING #: [id number]    Surgeon(s):  Abbey Bryant DPM     ASSISTANTS: Ehsan Soria DPM PGY2, Vani Bravo MS4    PRE-OP DIAGNOSIS:   1. TMA stump infection, left foot  2. Surgical wound dehiscence, left foot  3. Osteomyelitis, left foot  4. Type 2 DM with peripheral neuropathy    POST-OP DIAGNOSIS: Same as above. PROCEDURE:   1. Incision and drainage, left foot  2. Revision of transmetatarsal amputation, left foot    ANESTHESIA: General with local (13 cc of 1:1 mixture 1% lidocaine plain and 0.5% marcaine plain)    HEMOSTASIS: Pneumatic ankle left tourniquet @ 250 mmHg for 40 minutes. ESTIMATED BLOOD LOSS: Less than 10 cc. MATERIALS: 3-0 nylon, 1/2 inc iodoform packing  * No implants in log *    INJECTABLES: none    SPECIMEN: bone for surgical path and cx, clearance fragment bone for surgical path and cx, post irrigation culture swabs  ID Type Source Tests Collected by Time Destination   1 : LEFT FOOT WOUND POST IRRIGATION Swab Foot CULTURE, WOUND, CULTURE, ANAEROBIC AND AEROBIC Abbey Scale, DPM 8/24/2020 1923    A : LEFT FOOT BONE Bone Foot SURGICAL PATHOLOGY, CULTURE, ANAEROBIC AND AEROBIC Abbey Scale, DPM 8/24/2020 1855    B : CLEARANCE FRAGMENT OF BONE FROM LEFT FOOT Bone Foot SURGICAL PATHOLOGY, CULTURE, ANAEROBIC AND AEROBIC Abbey Scale, DPM 8/24/2020 2345        COMPLICATIONS: none    FINDINGS: All necrotic and fibrotic tissue debrided until bleeding tissue noted. No purulent drainage. Hard cortical bone. Confirmed on xray    The patient was counseled at length about the risks of neema Covid-19 during their perioperative period and any recovery window from their procedure.   The patient was made aware that neema Covid-19  may worsen their prognosis for recovering from their procedure  and lend to a higher morbidity and/or mortality risk. All material risks, benefits, and reasonable alternatives including postponing the procedure were discussed. The patient does wish to proceed with the procedure at this time.     Alejandrina Hicks DPM   Podiatric Medicine & Surgery   8/24/2020 at 7:52 PM
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
## 2020-08-24 NOTE — PLAN OF CARE
Problem: Falls - Risk of:  Goal: Will remain free from falls  Description: Will remain free from falls  8/24/2020 1238 by Zahra Chase RN  Outcome: Ongoing  8/24/2020 0238 by Emely Gutierrez RN  Outcome: Ongoing  Goal: Absence of physical injury  Description: Absence of physical injury  8/24/2020 1238 by Zahra Chase RN  Outcome: Ongoing  8/24/2020 0238 by Emely Gutierrez RN  Outcome: Ongoing     Problem: Skin Integrity:  Goal: Will show no infection signs and symptoms  Description: Will show no infection signs and symptoms  8/24/2020 1238 by Zahra Chase RN  Outcome: Ongoing  8/24/2020 0238 by Emely Gutierrez RN  Outcome: Ongoing  Goal: Absence of new skin breakdown  Description: Absence of new skin breakdown  8/24/2020 1238 by Zahra Chase RN  Outcome: Ongoing  8/24/2020 0238 by Emely Gutierrez RN  Outcome: Ongoing  Goal: Risk for impaired skin integrity will decrease  Description: Risk for impaired skin integrity will decrease  Outcome: Ongoing     Problem: Pain:  Goal: Pain level will decrease  Description: Pain level will decrease  8/24/2020 1238 by Zahra Chase RN  Outcome: Ongoing  8/24/2020 0238 by Emely Gutierrez RN  Outcome: Ongoing  Goal: Control of acute pain  Description: Control of acute pain  8/24/2020 1238 by Zahra Chase RN  Outcome: Ongoing  8/24/2020 0238 by Emely Gutierrez RN  Outcome: Ongoing  Goal: Control of chronic pain  Description: Control of chronic pain  8/24/2020 0238 by Emely Gutierrez RN  Outcome: Ongoing     Problem: Tobacco Use:  Goal: Inpatient tobacco use cessation counseling participation  Description: Inpatient tobacco use cessation counseling participation  Outcome: Ongoing     Problem:  Activity:  Goal: Risk for activity intolerance will decrease  Description: Risk for activity intolerance will decrease  Outcome: Ongoing     Problem: Coping:  Goal: Ability to adjust to condition or change in health will improve  Description: Ability to adjust to condition or change in health will improve  Outcome: Ongoing   Pt receiving IV atb, , scheduled for surgery today

## 2020-08-24 NOTE — PROGRESS NOTES
Nightly    apixaban  5 mg Oral BID    aspirin  81 mg Oral Daily    atorvastatin  40 mg Oral Daily    dilTIAZem  60 mg Oral 4x Daily    famotidine  20 mg Oral BID    ipratropium-albuterol  3 mL Inhalation Q4H WA    metFORMIN  500 mg Oral BID WC    nortriptyline  50 mg Oral Nightly    sodium chloride flush  10 mL Intravenous 2 times per day    piperacillin-tazobactam  3.375 g Intravenous Q8H    insulin glargine  70 Units Subcutaneous Nightly       Continuous Infusions:   sodium chloride 75 mL/hr at 20 0215    dextrose         PRN Meds:ketorolac, sodium chloride flush, potassium chloride **OR** potassium alternative oral replacement **OR** potassium chloride, magnesium sulfate, acetaminophen **OR** acetaminophen, polyethylene glycol, promethazine **OR** ondansetron, nicotine, oxyCODONE-acetaminophen, glucose, dextrose, glucagon (rDNA), dextrose    Objective     Vitals:  Patient Vitals for the past 8 hrs:   BP Temp Temp src Pulse Resp SpO2 Weight   20 0743 -- -- -- -- -- 93 % --   20 0736 (!) 146/64 97.7 °F (36.5 °C) Oral 90 18 93 % --   20 0452 -- -- -- -- -- -- 170 lb 3.2 oz (77.2 kg)     Average, Min, and Max for last 24 hours Vitals:  TEMPERATURE:  Temp  Av.8 °F (36.6 °C)  Min: 97.5 °F (36.4 °C)  Max: 98.2 °F (36.8 °C)    RESPIRATIONS RANGE: Resp  Av  Min: 18  Max: 18    PULSE RANGE: Pulse  Av.7  Min: 81  Max: 90    BLOOD PRESSURE RANGE:  Systolic (94FDS), WTI:780 , Min:123 , QY   ; Diastolic (83BIN), HAD:76, Min:54, Max:64      PULSE OXIMETRY RANGE: SpO2  Av %  Min: 93 %  Max: 97 %    I/O last 3 completed shifts:   In: 1010 [I.V.:1010]  Out: 1150 [Urine:1150]    CBC:  Recent Labs     20  1708 08/23/20  0550   WBC 13.7* 12.6*   HGB 10.9* 10.1*   HCT 35.4* 33.9*   * 420   CRP 27.4*  --         BMP:  Recent Labs     20  1708 20  0550   * 139   K 4.4 3.6*   CL 96* 102   CO2 26 28   BUN 21 19   CREATININE 0.88 0.84   GLUCOSE 484* 241*   CALCIUM 9.1 8.9        Coags:  No results for input(s): APTT, PROT, INR in the last 72 hours. Lab Results   Component Value Date    SEDRATE 74 (H) 08/22/2020     Recent Labs     08/22/20  1708   CRP 27.4*       Physical Exam:  Right-sided BKA     Vascular: DP and PT pulses are palpable. CFT brisk to flap edges. Hair growth is absent to the level of the digits. Mild nonpitting edema distal stump.       Neuro: Saph/sural/SP/DP/plantar sensation absent to light touch.     Musculoskeletal: Muscle strength is adequate ROM, adequate strength to all lower extremity muscle groups. Compartments are soft compressible. gross deformity is present with right-sided BKA and left TMA stump. No pain with compression of posterior calf.     Dermatologic: Full-thickness surgical wound dehiscence with exposed tendon and bone at the level of the distal TMA stump. Measures approximately 11.3cm x5.5cm x 3cm. Base is fibrotic. Mild erythema localized to distal forefoot. Scant purulent drainage noted with associated mal odor. The wound does probe to bone as well as sinus tract plantarly and dorsally. No fluctuance or obvious drainable fluid collection. Clinical Images:          Imaging:   XR FOOT LEFT (MIN 3 VIEWS)   Final Result   Skin defect with soft tissue swelling and lucencies of the amputation stump   likely representing cellulitis with gas-forming organisms. Underlying 3rd   metatarsal shows subtle distal marginal irregularity on the oblique view. This could be accentuated by the soft tissue lucencies nonetheless concerning   for osteomyelitis. Cultures:      Culture, Anaerobic and Aerobic [[id number]]  (Abnormal)  Collected: 08/22/20 1830    Order Status: Completed  Specimen: Foot  Updated: 08/23/20 2015     Specimen Description  . FOOT     Special Requests  NOT REPORTED     Direct Exam  RARE NEUTROPHILSAbnormal        MANY GRAM NEGATIVE RODSAbnormal        FEW GRAM POSITIVE COCCI IN [employer] Culture  CULTURE IN PROGRESS            Assessment   Antonella Brown is a 60 y.o. male with   1. TMA stump infection, left foot  2. Surgical wound dehiscence, left foot  3. Cellulitis, left foot  4. Possible osteomyelitis, left foot  5. Diabetes mellitus type 2 with associated peripheral neuropathy  6. Noncompliance    Principal Problem:    Diabetic foot infection ([address]  Active Problems:    Type 2 diabetes mellitus with diabetic polyneuropathy, with long-term current use of insulin ([facility])    Diabetic polyneuropathy ([facility])    Tobacco dependence    Edentulous    COPD without exacerbation ([facility])    Dyslipidemia    PVD (peripheral vascular disease) ([facility])    Below-knee amputation of right lower extremity ([address]    Essential hypertension    Uncontrolled type 2 diabetes mellitus with hyperglycemia ([address]    Noncompliance  Resolved Problems:    * No resolved hospital problems. *       Plan     · Patient examined and evaluated at bedside   · Treatment options discussed in detail with the patient  · Radiographs reviewed and discussed in detail with patient--demonstrate finding suspicious for early osteomyelitis of the distal aspect of the third metatarsal remnant, focal pocket of soft tissue emphysema dorsal distal TMA stump.   · Primary- medical management, hyperglycemia improving  · ID following--IV antibiotics with cefepime and vancomycin  · Pain control with Percocet pain panel  · Tentatively plan for revision of left TMA today, 08/24/2020, at 5:30pm  · NPO since 6am  · Hold [facility]   · Consent signed and in chart  · COVID negative  · Dressing applied to Left lower extremity: Consisting of Betadine wet-to-dry, DSD, Ace  · Strict nonweightbearing to the left lower extremity      Jean Pierre Jay DPM   Podiatric Medicine & Surgery   8/24/2020 at 12:13 PM

## 2020-08-24 NOTE — PROGRESS NOTES
Pharmacy Note  Vancomycin Consult - Follow Up     Vancomycin Therapy Day: 3  Current Dosing: Vancomycin 1250 mg q12h  Current diagnosis for which MRSA is suspected/confirmed: TMA stump infection, cellulitis left foot, possible osteomyelitis  ONLY for suspected pneumonia or COPD: MRSA nasal swab   N/A: Non respiratory infection. .      Temp: 97.7 F    Actual Weight:   Wt Readings from Last 1 Encounters:   08/24/20 170 lb 3.2 oz (77.2 kg)       Recent Labs     08/22/20  1708 08/23/20  0550   CREATININE 0.88 0.84     Calculate CrCl based on IBW: 98.4 mL/min    Recent Labs     08/22/20  1708 08/23/20  0550   WBC 13.7* 12.6*         Intake/Output Summary (Last 24 hours) at 8/24/2020 0743  Last data filed at 8/23/2020 1745  Gross per 24 hour   Intake 1010 ml   Output 1150 ml   Net -140 ml         ASSESSMENT/PLAN    Trough drawn 8/24/20 07:14 was 16.4 mcg/mL which is within our goal range of 15-20 mcg/mL. Continue current dosing schedule. Thank you for the consult. Will Continue to follow.   Sandy Russell RPh  8/24/2020  7:43 AM

## 2020-08-25 LAB
C-REACTIVE PROTEIN: 31.4 MG/L (ref 0–5)
GLUCOSE BLD-MCNC: 135 MG/DL (ref 75–110)
GLUCOSE BLD-MCNC: 199 MG/DL (ref 75–110)
GLUCOSE BLD-MCNC: 222 MG/DL (ref 75–110)
GLUCOSE BLD-MCNC: 289 MG/DL (ref 75–110)
GLUCOSE BLD-MCNC: 56 MG/DL (ref 75–110)

## 2020-08-25 PROCEDURE — 6360000002 HC RX W HCPCS: Performed by: STUDENT IN AN ORGANIZED HEALTH CARE EDUCATION/TRAINING PROGRAM

## 2020-08-25 PROCEDURE — 2580000003 HC RX 258: Performed by: STUDENT IN AN ORGANIZED HEALTH CARE EDUCATION/TRAINING PROGRAM

## 2020-08-25 PROCEDURE — 99232 SBSQ HOSP IP/OBS MODERATE 35: CPT | Performed by: INTERNAL MEDICINE

## 2020-08-25 PROCEDURE — 6370000000 HC RX 637 (ALT 250 FOR IP): Performed by: STUDENT IN AN ORGANIZED HEALTH CARE EDUCATION/TRAINING PROGRAM

## 2020-08-25 PROCEDURE — 82947 ASSAY GLUCOSE BLOOD QUANT: CPT

## 2020-08-25 PROCEDURE — 99232 SBSQ HOSP IP/OBS MODERATE 35: CPT | Performed by: FAMILY MEDICINE

## 2020-08-25 PROCEDURE — 6370000000 HC RX 637 (ALT 250 FOR IP): Performed by: NURSE PRACTITIONER

## 2020-08-25 PROCEDURE — 94640 AIRWAY INHALATION TREATMENT: CPT

## 2020-08-25 PROCEDURE — 1200000000 HC SEMI PRIVATE

## 2020-08-25 PROCEDURE — 6370000000 HC RX 637 (ALT 250 FOR IP): Performed by: FAMILY MEDICINE

## 2020-08-25 RX ORDER — OXYCODONE HYDROCHLORIDE AND ACETAMINOPHEN 5; 325 MG/1; MG/1
1 TABLET ORAL EVERY 4 HOURS PRN
Status: DISCONTINUED | OUTPATIENT
Start: 2020-08-25 | End: 2020-09-03 | Stop reason: HOSPADM

## 2020-08-25 RX ORDER — OXYCODONE HYDROCHLORIDE AND ACETAMINOPHEN 5; 325 MG/1; MG/1
1 TABLET ORAL EVERY 4 HOURS PRN
Status: DISCONTINUED | OUTPATIENT
Start: 2020-08-25 | End: 2020-08-25

## 2020-08-25 RX ORDER — DOCUSATE SODIUM 100 MG/1
100 CAPSULE, LIQUID FILLED ORAL 2 TIMES DAILY PRN
Status: DISCONTINUED | OUTPATIENT
Start: 2020-08-25 | End: 2020-08-31

## 2020-08-25 RX ORDER — OXYCODONE HYDROCHLORIDE AND ACETAMINOPHEN 5; 325 MG/1; MG/1
2 TABLET ORAL EVERY 4 HOURS PRN
Status: DISCONTINUED | OUTPATIENT
Start: 2020-08-25 | End: 2020-09-03 | Stop reason: HOSPADM

## 2020-08-25 RX ORDER — POLYETHYLENE GLYCOL 3350 17 G/17G
17 POWDER, FOR SOLUTION ORAL 2 TIMES DAILY
Status: DISCONTINUED | OUTPATIENT
Start: 2020-08-25 | End: 2020-09-03 | Stop reason: HOSPADM

## 2020-08-25 RX ADMIN — OXYCODONE HYDROCHLORIDE AND ACETAMINOPHEN 2 TABLET: 5; 325 TABLET ORAL at 17:46

## 2020-08-25 RX ADMIN — OXYCODONE HYDROCHLORIDE AND ACETAMINOPHEN 1 TABLET: 5; 325 TABLET ORAL at 13:00

## 2020-08-25 RX ADMIN — INSULIN GLARGINE 35 UNITS: 100 INJECTION, SOLUTION SUBCUTANEOUS at 21:32

## 2020-08-25 RX ADMIN — IPRATROPIUM BROMIDE AND ALBUTEROL SULFATE 3 ML: .5; 3 SOLUTION RESPIRATORY (INHALATION) at 14:55

## 2020-08-25 RX ADMIN — APIXABAN 5 MG: 5 TABLET, FILM COATED ORAL at 08:30

## 2020-08-25 RX ADMIN — ASPIRIN 81 MG 81 MG: 81 TABLET ORAL at 08:30

## 2020-08-25 RX ADMIN — PIPERACILLIN AND TAZOBACTAM 3.38 G: 3; .375 INJECTION, POWDER, LYOPHILIZED, FOR SOLUTION INTRAVENOUS at 05:11

## 2020-08-25 RX ADMIN — VANCOMYCIN HYDROCHLORIDE 1250 MG: 1.25 INJECTION, POWDER, LYOPHILIZED, FOR SOLUTION INTRAVENOUS at 08:31

## 2020-08-25 RX ADMIN — PIPERACILLIN AND TAZOBACTAM 3.38 G: 3; .375 INJECTION, POWDER, LYOPHILIZED, FOR SOLUTION INTRAVENOUS at 22:49

## 2020-08-25 RX ADMIN — SODIUM CHLORIDE: 9 INJECTION, SOLUTION INTRAVENOUS at 13:30

## 2020-08-25 RX ADMIN — METFORMIN HYDROCHLORIDE 500 MG: 500 TABLET ORAL at 08:30

## 2020-08-25 RX ADMIN — OXYCODONE HYDROCHLORIDE AND ACETAMINOPHEN 1 TABLET: 5; 325 TABLET ORAL at 03:25

## 2020-08-25 RX ADMIN — INSULIN LISPRO 3 UNITS: 100 INJECTION, SOLUTION INTRAVENOUS; SUBCUTANEOUS at 16:45

## 2020-08-25 RX ADMIN — NORTRIPTYLINE HYDROCHLORIDE 50 MG: 25 CAPSULE ORAL at 21:34

## 2020-08-25 RX ADMIN — DILTIAZEM HYDROCHLORIDE 60 MG: 60 TABLET, FILM COATED ORAL at 08:30

## 2020-08-25 RX ADMIN — DILTIAZEM HYDROCHLORIDE 60 MG: 60 TABLET, FILM COATED ORAL at 16:44

## 2020-08-25 RX ADMIN — METFORMIN HYDROCHLORIDE 500 MG: 500 TABLET ORAL at 16:44

## 2020-08-25 RX ADMIN — OXYCODONE HYDROCHLORIDE AND ACETAMINOPHEN 1 TABLET: 5; 325 TABLET ORAL at 08:30

## 2020-08-25 RX ADMIN — APIXABAN 5 MG: 5 TABLET, FILM COATED ORAL at 21:34

## 2020-08-25 RX ADMIN — IPRATROPIUM BROMIDE AND ALBUTEROL SULFATE 3 ML: .5; 3 SOLUTION RESPIRATORY (INHALATION) at 11:13

## 2020-08-25 RX ADMIN — KETOROLAC TROMETHAMINE 15 MG: 15 INJECTION, SOLUTION INTRAMUSCULAR; INTRAVENOUS at 05:12

## 2020-08-25 RX ADMIN — ATORVASTATIN CALCIUM 40 MG: 40 TABLET, FILM COATED ORAL at 08:30

## 2020-08-25 RX ADMIN — PIPERACILLIN AND TAZOBACTAM 3.38 G: 3; .375 INJECTION, POWDER, LYOPHILIZED, FOR SOLUTION INTRAVENOUS at 13:00

## 2020-08-25 RX ADMIN — ONDANSETRON 4 MG: 2 INJECTION INTRAMUSCULAR; INTRAVENOUS at 20:03

## 2020-08-25 RX ADMIN — DOCUSATE SODIUM 100 MG: 100 CAPSULE, LIQUID FILLED ORAL at 18:16

## 2020-08-25 RX ADMIN — FAMOTIDINE 20 MG: 20 TABLET ORAL at 21:34

## 2020-08-25 RX ADMIN — OXYCODONE HYDROCHLORIDE AND ACETAMINOPHEN 2 TABLET: 5; 325 TABLET ORAL at 21:59

## 2020-08-25 RX ADMIN — DILTIAZEM HYDROCHLORIDE 60 MG: 60 TABLET, FILM COATED ORAL at 13:00

## 2020-08-25 RX ADMIN — VANCOMYCIN HYDROCHLORIDE 1250 MG: 1.25 INJECTION, POWDER, LYOPHILIZED, FOR SOLUTION INTRAVENOUS at 21:05

## 2020-08-25 RX ADMIN — FAMOTIDINE 20 MG: 20 TABLET ORAL at 08:30

## 2020-08-25 RX ADMIN — INSULIN LISPRO 5 UNITS: 100 INJECTION, SOLUTION INTRAVENOUS; SUBCUTANEOUS at 21:33

## 2020-08-25 RX ADMIN — INSULIN LISPRO 6 UNITS: 100 INJECTION, SOLUTION INTRAVENOUS; SUBCUTANEOUS at 12:02

## 2020-08-25 RX ADMIN — DILTIAZEM HYDROCHLORIDE 60 MG: 60 TABLET, FILM COATED ORAL at 21:35

## 2020-08-25 RX ADMIN — POLYETHYLENE GLYCOL (3350) 17 G: 17 POWDER, FOR SOLUTION ORAL at 18:16

## 2020-08-25 ASSESSMENT — ENCOUNTER SYMPTOMS
VOICE CHANGE: 0
GASTROINTESTINAL NEGATIVE: 1
VOMITING: 0
RESPIRATORY NEGATIVE: 1
SINUS PRESSURE: 0
NAUSEA: 0
DIARRHEA: 0
CONSTIPATION: 0
BACK PAIN: 0
CHEST TIGHTNESS: 0
RHINORRHEA: 0
CHOKING: 0
ALLERGIC/IMMUNOLOGIC NEGATIVE: 1
ABDOMINAL PAIN: 0
WHEEZING: 0
COUGH: 0
SHORTNESS OF BREATH: 0

## 2020-08-25 ASSESSMENT — PAIN SCALES - GENERAL
PAINLEVEL_OUTOF10: 8
PAINLEVEL_OUTOF10: 5
PAINLEVEL_OUTOF10: 8
PAINLEVEL_OUTOF10: 9
PAINLEVEL_OUTOF10: 8
PAINLEVEL_OUTOF10: 5
PAINLEVEL_OUTOF10: 10

## 2020-08-25 ASSESSMENT — PAIN DESCRIPTION - PAIN TYPE
TYPE: SURGICAL PAIN
TYPE: SURGICAL PAIN

## 2020-08-25 ASSESSMENT — PAIN DESCRIPTION - LOCATION
LOCATION: FOOT
LOCATION: FOOT

## 2020-08-25 ASSESSMENT — PAIN DESCRIPTION - FREQUENCY: FREQUENCY: CONTINUOUS

## 2020-08-25 ASSESSMENT — PAIN DESCRIPTION - PROGRESSION: CLINICAL_PROGRESSION: NOT CHANGED

## 2020-08-25 ASSESSMENT — PAIN DESCRIPTION - DESCRIPTORS
DESCRIPTORS: SORE
DESCRIPTORS: ACHING;SORE

## 2020-08-25 ASSESSMENT — PAIN DESCRIPTION - ORIENTATION
ORIENTATION: LEFT
ORIENTATION: LEFT

## 2020-08-25 ASSESSMENT — PAIN DESCRIPTION - ONSET: ONSET: ON-GOING

## 2020-08-25 NOTE — PLAN OF CARE
Problem: Falls - Risk of:  Goal: Will remain free from falls  Description: Will remain free from falls  8/25/2020 0056 by Odalys Christina RN  Outcome: Ongoing  8/25/2020 0055 by Odalys Christina RN  Outcome: Ongoing  8/24/2020 1238 by Guerline Beck RN  Outcome: Ongoing  Goal: Absence of physical injury  Description: Absence of physical injury  8/25/2020 0056 by Odalys Christina RN  Outcome: Ongoing  8/25/2020 0055 by Odalys Christina RN  Outcome: Ongoing  8/24/2020 1238 by Guerline Beck RN  Outcome: Ongoing     Problem: Skin Integrity:  Goal: Will show no infection signs and symptoms  Description: Will show no infection signs and symptoms  8/25/2020 0056 by Odalys Christina RN  Outcome: Ongoing  8/25/2020 0055 by Odalys Christina RN  Outcome: Ongoing  8/24/2020 1238 by Guerline Beck RN  Outcome: Ongoing  Goal: Absence of new skin breakdown  Description: Absence of new skin breakdown  8/25/2020 0056 by Odalys Christina RN  Outcome: Ongoing  8/25/2020 0055 by Odalys Christina RN  Outcome: Ongoing  8/24/2020 1238 by Guerline Beck RN  Outcome: Ongoing  Goal: Risk for impaired skin integrity will decrease  Description: Risk for impaired skin integrity will decrease  8/25/2020 0056 by Odalys Christina RN  Outcome: Ongoing  8/25/2020 0055 by Odalys Christina RN  Outcome: Ongoing  8/24/2020 1238 by Guerline Beck RN  Outcome: Ongoing     Problem: Pain:  Goal: Pain level will decrease  Description: Pain level will decrease  8/25/2020 0056 by Odalys Christina RN  Outcome: Ongoing  8/25/2020 0055 by Odalys Christina RN  Outcome: Ongoing  8/24/2020 1238 by Guerline Beck RN  Outcome: Ongoing  Goal: Control of acute pain  Description: Control of acute pain  8/25/2020 0056 by Odalys Christina RN  Outcome: Ongoing  8/25/2020 0055 by Odalys Christina RN  Outcome: Ongoing  8/24/2020 1238 by Guerline Beck RN  Outcome: Ongoing  Goal: Control of chronic pain  Description: Control of chronic pain  8/25/2020 0056 by Odalys Christina RN  Outcome: Ongoing  8/25/2020 0055 by Jessee Moran RN  Outcome: Ongoing     Problem: Tobacco Use:  Goal: Inpatient tobacco use cessation counseling participation  Description: Inpatient tobacco use cessation counseling participation  8/25/2020 0056 by Jessee Moran RN  Outcome: Ongoing  8/25/2020 0055 by Jessee Moran RN  Outcome: Ongoing  8/24/2020 1238 by Shannon Ruiz RN  Outcome: Ongoing     Problem:  Activity:  Goal: Risk for activity intolerance will decrease  Description: Risk for activity intolerance will decrease  8/25/2020 0056 by Jessee Moran RN  Outcome: Ongoing  8/25/2020 0055 by Jessee Moran RN  Outcome: Ongoing  8/24/2020 1238 by Shannon Ruiz RN  Outcome: Ongoing     Problem: Coping:  Goal: Ability to adjust to condition or change in health will improve  Description: Ability to adjust to condition or change in health will improve  8/25/2020 0056 by Jessee Moran RN  Outcome: Ongoing  8/25/2020 0055 by Jessee Moran RN  Outcome: Ongoing  8/24/2020 1238 by Shannon Ruiz RN  Outcome: Ongoing     Problem: Fluid Volume:  Goal: Ability to maintain a balanced intake and output will improve  Description: Ability to maintain a balanced intake and output will improve  8/25/2020 0056 by Jessee Moran RN  Outcome: Ongoing  8/25/2020 0055 by Jessee Moran RN  Outcome: Ongoing     Problem: Health Behavior:  Goal: Ability to identify and utilize available resources and services will improve  Description: Ability to identify and utilize available resources and services will improve  8/25/2020 0056 by Jessee Moran RN  Outcome: Ongoing  8/25/2020 0055 by Jessee Moran RN  Outcome: Ongoing  Goal: Ability to manage health-related needs will improve  Description: Ability to manage health-related needs will improve  8/25/2020 0056 by Jessee Moran RN  Outcome: Ongoing  8/25/2020 0055 by Jessee Moran RN  Outcome: Ongoing     Problem: Metabolic:  Goal: Ability to maintain appropriate glucose levels will

## 2020-08-25 NOTE — ANESTHESIA POSTPROCEDURE EVALUATION
Department of Anesthesiology  Postprocedure Note    Patient: Amina Morales  MRN: [id number]  YOB: 1955  Date of evaluation: 8/24/2020  Time:  8:08 PM     Procedure Summary     Date:  08/24/20 Room / Location:  [address] [place] [address] - INPATIENT    Anesthesia Start:  1816 Anesthesia Stop:  1950    Procedure:  REVISION  TRANSMETATARSAL AMPUTATION WITH DEBRIDEMENT. (Left Toes) Diagnosis:  (FOOT INFECTION)    Surgeon:  Abimael Montaño DPM Responsible Provider:  Kim Sena MD    Anesthesia Type:  general ASA Status:  3          Anesthesia Type: general    Lynne Phase I: Lynne Score: 10    Lynne Phase II:      Last vitals: Reviewed and per EMR flowsheets.        Anesthesia Post Evaluation    Patient location during evaluation: PACU  Level of consciousness: awake and alert  Complications: no

## 2020-08-25 NOTE — PLAN OF CARE
RN  Outcome: Ongoing  8/25/2020 0055 by Susan Bruno RN  Outcome: Ongoing     Problem: Nutritional:  Goal: Maintenance of adequate nutrition will improve  Description: Maintenance of adequate nutrition will improve  8/25/2020 0056 by Suasn Bruno RN  Outcome: Ongoing  8/25/2020 0055 by Susan Bruno RN  Outcome: Ongoing  Goal: Progress toward achieving an optimal weight will improve  Description: Progress toward achieving an optimal weight will improve  8/25/2020 0056 by Susan Bruno RN  Outcome: Ongoing  8/25/2020 0055 by Susan Bruno RN  Outcome: Ongoing     Problem: Physical Regulation:  Goal: Complications related to the disease process, condition or treatment will be avoided or minimized  Description: Complications related to the disease process, condition or treatment will be avoided or minimized  8/25/2020 0056 by Susan Bruno RN  Outcome: Ongoing  8/25/2020 0055 by Susan Bruno RN  Outcome: Ongoing  Goal: Diagnostic test results will improve  Description: Diagnostic test results will improve  8/25/2020 0056 by Susan Bruno RN  Outcome: Ongoing  8/25/2020 0055 by Susan Bruno RN  Outcome: Ongoing     Problem: Tissue Perfusion:  Goal: Adequacy of tissue perfusion will improve  Description: Adequacy of tissue perfusion will improve  8/25/2020 0056 by Susan Bruno RN  Outcome: Ongoing  8/25/2020 0055 by Susan Bruno RN  Outcome: Ongoing   Iv atb, POD consult

## 2020-08-25 NOTE — PROGRESS NOTES
[facility]  Progress Note    Amina Palacios  MRN: [id number]  Date: 8/25/2020  LOS: 3     Reason for F/U :   Left TMA stump infection    Impression :   1. Recent left transmetatarsal amputation 8/5/2020  2. Trans-metatarsal stump infection with wound dehiscence and possible underlying osteomyelitis of the metatarsals  · Status post bedside wound debridement 8/22/2020  · Status post incision and drainage of the left foot with revision of transmetatarsal amputation of the left foot 8/24/2020  3. Peripheral arterial disease status post revascularization  4. 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 · The patient continues on Zosyn and vancomycin for now  · The patient has an area that is open which is being packed with iodoform  · The plan is to continue wound care for 1 or 2 days if is not healing the patient will have a wound VAC  · I will follow the operative culture data and decide on antibiotics IV versus oral at the time of discharge    Infection Control Recommendations:   Universal precautions    Discharge Planning:   Estimated Length of IV antimicrobials: To be determined  Patient will need Midline Catheter Insertion/ PICC line Insertion: No  Patient will need: Home IV , [place],  SNF,  LTAC: Undetermined  Patient willneed outpatient wound care: No    Medical Decision making / Summary of Stay:   Amina Palacios is a 60y.o.-year-old male who was initially admitted on 8/22/2020. Steve Metcalf has a history of peripheral arterial disease and has undergone revascularization procedures on the left in the past, esophageal cancer, diabetes mellitus with associated neuropathy and he was recently hospitalized with left foot plantar ulcerations of the great toe with associated gangrene and cellulitis of the foot.   The Heart sounds: Normal heart sounds. No friction rub. No gallop. Pulmonary:      Effort: Pulmonary effort is normal.      Breath sounds: Normal breath sounds. No wheezing. Abdominal:      General: Bowel sounds are normal.      Palpations: Abdomen is soft. There is no mass. Tenderness: There is no abdominal tenderness. Musculoskeletal:      Comments: Right below the knee amputation   Lymphadenopathy:      Cervical: No cervical adenopathy. Skin:     General: Skin is warm and dry. Comments: There is a dressing in the left foot   Neurological:      Mental Status: He is alert and oriented to person, place, and time. Laboratory data:   I have independently reviewed the followinglabs:  CBC with Differential:   Recent Labs     08/22/20  1708 08/23/20  0550   WBC 13.7* 12.6*   HGB 10.9* 10.1*   HCT 35.4* 33.9*   * 420   LYMPHOPCT 17*  --    MONOPCT 7  --      BMP:   Recent Labs     08/22/20  1708 08/23/20  0550   * 139   K 4.4 3.6*   CL 96* 102   CO2 26 28   BUN 21 19   CREATININE 0.88 0.84     Hepatic Function Panel: No results for input(s): PROT, LABALBU, BILIDIR, IBILI, BILITOT, ALKPHOS, ALT, AST in the last 72 hours. No results found for: PROCAL  Lab Results   Component Value Date    CRP 31.4 08/24/2020    CRP 27.4 08/22/2020    CRP 72.8 08/02/2020     Lab Results   Component Value Date    SEDRATE 74 (H) 08/22/2020       Lab Results   Component Value Date    DDIMER 0.39 06/29/2020    DDIMER 1.63 12/20/2017    DDIMER 0.68 12/25/2011    FERRITIN 64 01/25/2014       Recent [facility]     [facility]20  07[facility]       Imaging Studies:   No new imaging    Cultures:     Culture, Blood 1 [[id number]]   Collected: 08/22/20 1711    Order Status: Completed  Specimen: Blood  Updated: 08/25/20 0015     Specimen Description  . BLOOD     Special Requests  L HAND 4 ML     Culture  NO GROWTH 3 DAYS    Culture, Blood 1 [[id number]]   Collected: 08/22/20 1711    Order Status: Completed Specimen: Blood  Updated: 08/25/20 0015     Specimen Description  . BLOOD     Special Requests  R AC 6 ML     Culture  NO GROWTH 3 DAYS    Culture, Anaerobic and Aerobic [[id number]]  (Abnormal)  Collected: 08/24/20 1923    Order Status: Completed  Specimen: Swab from Foot  Updated: 08/25/20 0002     Specimen Description  . FOOT     Special Requests  NOT REPORTED     Direct Exam  RARE NEUTROPHILSAbnormal        NO BACTERIA SEEN     Culture  PENDING    Culture, Anaerobic and Aerobic [[id number]]  (Abnormal)  Collected: 08/24/20 1855    Order Status: Completed  Specimen: Bone from Foot  Updated: 08/25/20 0001     Specimen Description  . FOOT     Special Requests  NOT REPORTED     Direct Exam  RARE NEUTROPHILSAbnormal        RARE GRAM POSITIVE RODSAbnormal       Culture  PENDING    Culture, Anaerobic and Aerobic [[id number]]   Collected: 08/24/20 1900    Order Status: Completed  Specimen: Bone from Foot  Updated: 08/24/20 2354     Specimen Description  . FOOT     Special Requests  NOT REPORTED     Direct Exam  NO NEUTROPHILS SEEN      NO BACTERIA SEEN     Culture  PENDING      Culture, Anaerobic and Aerobic [[id number]]  (Abnormal)  Collected: 08/22/20 1830    Order Status: Completed  Specimen: Foot  Updated: 08/24/20 1811     Specimen Description  . FOOT     Special Requests  NOT REPORTED     Direct Exam  RARE NEUTROPHILSAbnormal        MANY GRAM NEGATIVE RODSAbnormal        FEW GRAM POSITIVE COCCI IN PAIRSAbnormal       Culture  NON LACTOSE FERMENTING GRAM NEGATIVE RODS MODERATE GROWTHAbnormal        PROTEUS SPECIES MODERATE GROWTHAbnormal        PRESUMPTIVE ID: GROUP D ENTEROCOCCUS MODERATE GROWTH      This is a mixed culture of organisms, which may represent colonization or contamination.  Notify the laboratory within 7 days for further workup.  Susceptibilities have not been performed. Abnormal         Medications:      vancomycin  1,250 mg Intravenous Q12H    vancomycin (VANCOCIN) intermittent dosing (placeholder) Other RX Placeholder    insulin lispro  0-18 Units Subcutaneous TID WC    insulin lispro  0-9 Units Subcutaneous Nightly    apixaban  5 mg Oral BID    aspirin  81 mg Oral Daily    atorvastatin  40 mg Oral Daily    dilTIAZem  60 mg Oral 4x Daily    famotidine  20 mg Oral BID    ipratropium-albuterol  3 mL Inhalation Q4H WA    metFORMIN  500 mg Oral BID WC    nortriptyline  50 mg Oral Nightly    sodium chloride flush  10 mL Intravenous 2 times per day    piperacillin-tazobactam  3.375 g Intravenous Q8H    insulin glargine  70 Units Subcutaneous Nightly           [facility]  [address] MD  Perfect Serve messaging  OFFICE: [phone]      Electronically signed by [address], MD on 8/25/2020 at 11:13 AM  Thank you for allowing us to participate in the care of this patient. Please call with questions. This note iscreated with the assistance of a speech recognition program.  While intending to generate a document that actually reflects the content of the visit, the document can still have some errors including those of syntax andsound a like substitutions which may escape proof reading. In such instances, actual meaning can be extrapolated by contextual diversion.

## 2020-08-25 NOTE — PROGRESS NOTES
History & Physical - Short Stay  Gastroenterology      SUBJECTIVE:     Procedure: Colonoscopy    Chief Complaint/Indication for Procedure: Screening    PTA Medications   Medication Sig    aspirin 81 MG Chew Take 81 mg by mouth once daily.      clobetasol (TEMOVATE) 0.05 % cream Apply topically 2 (two) times daily.    escitalopram (LEXAPRO) 10 MG tablet TAKE 1 TABLET BY MOUTH EVERY DAY    estrogens,conjugated,-methyltestosterone 0.625-1.25mg (ESTRATEST HS) 0.625-1.25 mg per tablet Take 1 tablet by mouth once daily.    fish oil-omega-3 fatty acids 300-1,000 mg capsule Take 1 g by mouth once daily.     fluticasone (FLONASE) 50 mcg/actuation nasal spray 2 sprays by Each Nare route once daily.    folic acid-vit B6-vit B12 2.5-25-2 mg (FOLBIC) 2.5-25-2 mg Tab Take 1 tablet by mouth once daily.    gluc-elenita-msm#8-G-pkoo-gabriela-bor (OSTEO BI-FLEX) 750-625-30 mg Tab Take 1 tablet by mouth once daily.     hydrochlorothiazide (HYDRODIURIL) 25 MG tablet TAKE ONE TABLET BY MOUTH EVERY MORNING    irbesartan (AVAPRO) 300 MG tablet TAKE ONE TABLET BY MOUTH EVERY DAY    levothyroxine (SYNTHROID) 25 MCG tablet Take 1 tablet (25 mcg total) by mouth before breakfast.    metoprolol succinate (TOPROL-XL) 25 MG 24 hr tablet TAKE 1 TABLET BY MOUTH EVERY DAY    montelukast (SINGULAIR) 10 mg tablet TAKE 1 TABLET BY MOUTH EVERY EVENING    niacin 500 MG tablet Take 500 mg by mouth daily with breakfast.      pantoprazole (PROTONIX) 40 MG tablet Take 1 tablet (40 mg total) by mouth once daily.    pravastatin (PRAVACHOL) 80 MG tablet Take 1 tablet (80 mg total) by mouth once daily.    ranitidine (ZANTAC) 300 MG tablet TAKE 1 TABLET EVERY DAY    ropinirole (REQUIP) 0.25 MG tablet TAKE 3 TABLETS BY MOUTH EVERY EVENING    cyanocobalamin 1,000 mcg/mL injection Inject 1 mL (1,000 mcg total) into the skin every 7 days.    tolterodine (DETROL) 1 MG Tab TAKE 1 TABLET BY MOUTH TWICE A DAY (Patient taking differently: as needed)  Patient returned to room in stable condition from PACU. VSS.       Review of patient's allergies indicates:   Allergen Reactions    Codeine Nausea And Vomiting    Flagyl [metronidazole] Hives        Past Medical History:   Diagnosis Date    Arthritis     Chronic constipation     Colon polyp     GERD (gastroesophageal reflux disease)     Hematuria     Hyperlipidemia     Hypertension     PONV (postoperative nausea and vomiting)     Thyroid disease      Past Surgical History:   Procedure Laterality Date    APPENDECTOMY      BLADDER REPAIR  july 2002    [facility] - Dr Romero repaired fistula    COLONOSCOPY      polyps removed    Dilation and curettage      HYSTERECTOMY  june 2002    had a postop v-v fistula    TUBAL LIGATION       Family History   Problem Relation Age of Onset    Coronary artery disease Mother 46    Gallbladder disease Mother     Coronary artery disease Sister 50    Coronary artery disease Father 75    Hypertension      Hyperlipidemia      Diabetes type II      Gallbladder disease Sister     Gallbladder disease Sister      Social History   Substance Use Topics    Smoking status: Never Smoker    Smokeless tobacco: Never Used    Alcohol use No         OBJECTIVE:     Vital Signs (Most Recent)       Physical Exam                                                        GENERAL:  Comfortable, in no acute distress.                                 HEENT EXAM:  Nonicteric.  No adenopathy.  Oropharynx is clear.               NECK:  Supple.                                                               LUNGS:  Clear.                                                               CARDIAC:  Regular rate and rhythm.  S1, S2.  No murmur.                      ABDOMEN:  Soft, positive bowel sounds, nontender.  No hepatosplenomegaly or masses.  No rebound or guarding.                                             EXTREMITIES:  No edema.     MENTAL STATUS:  Normal, alert and oriented.      ASSESSMENT/PLAN:     Assessment: Colorectal cancer screening    Plan:  Colonoscopy    Anesthesia Plan: General    ASA Grade: ASA 2 - Patient with mild systemic disease with no functional limitations    MALLAMPATI SCORE:  I (soft palate, uvula, fauces, and tonsillar pillars visible)
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
## 2020-08-25 NOTE — PROGRESS NOTES
Daily    atorvastatin  40 mg Oral Daily    dilTIAZem  60 mg Oral 4x Daily    famotidine  20 mg Oral BID    ipratropium-albuterol  3 mL Inhalation Q4H WA    metFORMIN  500 mg Oral BID WC    nortriptyline  50 mg Oral Nightly    sodium chloride flush  10 mL Intravenous 2 times per day    piperacillin-tazobactam  3.375 g Intravenous Q8H    insulin glargine  70 Units Subcutaneous Nightly       Continuous Infusions:   sodium chloride 75 mL/hr at 20 1807    dextrose         PRN Meds:oxyCODONE-acetaminophen, ketorolac, sodium chloride flush, potassium chloride **OR** potassium alternative oral replacement **OR** potassium chloride, magnesium sulfate, acetaminophen **OR** acetaminophen, polyethylene glycol, promethazine **OR** ondansetron, nicotine, glucose, dextrose, glucagon (rDNA), dextrose    Objective     Vitals:  Patient Vitals for the past 8 hrs:   BP Temp Temp src Pulse Resp SpO2 Weight   20 0736 (!) 136/55 97.6 °F (36.4 °C) Oral 117 18 95 % --   20 0651 -- -- -- -- -- -- 168 lb (76.2 kg)   20 0345 (!) 147/79 98.2 °F (36.8 °C) Oral 100 16 97 % --     Average, Min, and Max for last 24 hours Vitals:  TEMPERATURE:  Temp  Av.1 °F (36.7 °C)  Min: 97 °F (36.1 °C)  Max: 98.4 °F (36.9 °C)    RESPIRATIONS RANGE: Resp  Av.1  Min: 0  Max: 23    PULSE RANGE: Pulse  Av.5  Min: 88  Max: 122    BLOOD PRESSURE RANGE:  Systolic (88BLQ), ZWE:942 , Min:76 , PHN:372   ; Diastolic (83UAM), CBU:81, Min:49, Max:79      PULSE OXIMETRY RANGE: SpO2  Av.5 %  Min: 92 %  Max: 100 %    I/O last 3 completed shifts: In: 2136 [P.O.:250;  I.V.:1776; IV Piggyback:110]  Out: 2100 [Urine:2100]    CBC:  Recent Labs     20  1708 20  0550   WBC 13.7* 12.6*   HGB 10.9* 10.1*   HCT 35.4* 33.9*   * 420   CRP 27.4*  --         BMP:  Recent Labs     20  1708 20  0550   * 139   K 4.4 3.6*   CL 96* 102   CO2 26 28   BUN 21 19   CREATININE 0.88 0.84   GLUCOSE 484* 241* CALCIUM 9.1 8.9        Coags:  No results for input(s): APTT, PROT, INR in the last 72 hours. Lab Results   Component Value Date    SEDRATE 74 (H) 08/22/2020     Recent Labs     08/22/20  1708   CRP 27.4*       Physical Exam:  Right-sided BKA     Vascular: DP and PT pulses are palpable. CFT brisk to flap edges. Hair growth is absent to the level of the digits. Mild nonpitting edema distal stump.       Neuro: Saph/sural/SP/DP/plantar sensation absent to light touch.     Musculoskeletal: Muscle strength is adequate ROM, adequate strength to all lower extremity muscle groups. Compartments are soft compressible. gross deformity is present with right-sided BKA and left TMA stump. No pain with compression of posterior calf.     Dermatologic: Incision of the TMA site well coapted with an opening centrally. Wound probes deep plantar medially approximately 4.0cm. Scant sanguinous drainage expressed, no purulence. No periwound erythema, slight increase in warmth. No malodor. No fluctuance or obvious drainable fluid collection. Clinical Images:              Imaging:   XR FOOT LEFT (MIN 3 VIEWS)   Final Result   Interval decreased air within the caudal soft tissues overlying the left foot   mid metatarsal amputation site suggesting interval debridement/drainage. XR FOOT LEFT (2 VIEWS)   Final Result   Interval increased distal soft tissue swelling and soft tissue gas concerning   for presence of cellulitis with gas-forming organism. XR FOOT LEFT (MIN 3 VIEWS)   Final Result   Skin defect with soft tissue swelling and lucencies of the amputation stump   likely representing cellulitis with gas-forming organisms. Underlying 3rd   metatarsal shows subtle distal marginal irregularity on the oblique view. This could be accentuated by the soft tissue lucencies nonetheless concerning   for osteomyelitis.              Cultures:      Culture, Anaerobic and Aerobic [[id number]]  (Abnormal)  Collected: 08/22/20 1830    Order Status: Completed  Specimen: Foot  Updated: 08/23/20 2015     Specimen Description  . FOOT     Special Requests  NOT REPORTED     Direct Exam  RARE NEUTROPHILSAbnormal        MANY GRAM NEGATIVE RODSAbnormal        FEW GRAM POSITIVE COCCI IN PAIRSAbnormal       Culture  CULTURE IN PROGRESS            Assessment   Amina Palacios is a 60 y.o. male with   1. TMA revision, left foot (DOS: 8/24/20)  2. Surgical wound dehiscence, left foot  3. Cellulitis, left foot  4. Possible osteomyelitis, left foot  5. Diabetes mellitus type 2 with associated peripheral neuropathy  6. Noncompliance    Principal Problem:    Diabetic foot infection ([facility]  Active Problems:    Type 2 diabetes mellitus with diabetic polyneuropathy, with long-term current use of insulin ([facility])    Diabetic polyneuropathy ([facility])    Tobacco dependence    Edentulous    COPD without exacerbation ([facility])    Dyslipidemia    PVD (peripheral vascular disease) ([facility])    Below-knee amputation of right lower extremity ([facility]    Essential hypertension    Uncontrolled type 2 diabetes mellitus with hyperglycemia ([facility]    Noncompliance  Resolved Problems:    * No resolved hospital problems. *       Plan     · Patient examined and evaluated at bedside   · Treatment options discussed in detail with the patient  · Post-op x-rays reviewed- Decreased soft tissue emphysema appreciated consistent with operative site  · Primary- medical management, hyperglycemia improving  · ID following--IV antibiotics with cefepime and vancomycin  · Pain control with Percocet pain panel  · Dressing applied to Left lower extremity: Consisting of 1/4 inch iodoform, DSD, Ace. Will continue for next day or 2.  If wound not healing, will consider wound VAC  · Strict nonweightbearing to the left lower extremity   · Discussed with Dr. Rasta Blackwell DPM   Podiatric Medicine & Surgery   8/25/2020 at 8:30 AM

## 2020-08-25 NOTE — PROGRESS NOTES
Day of Therapy:4  Current Dose:vancomycin 1250mg ivpb q12h /scr ordered qod  Culture Results           Blood:no growth x 3 days. Sputum:           Wound:foot pending            Urine: Other:  Temp Readings from Last 3 Encounters:   08/25/20 97.6 °F (36.4 °C) (Oral)   08/24/20 98.1 °F (36.7 °C)   08/07/20 97.9 °F (36.6 °C) (Oral)     Recent Labs     08/22/20  1708 08/23/20  0550   WBC 13.7* 12.6*     Recent Labs     08/22/20  1708 08/23/20  0550   CREATININE 0.88 0.84     Estimated Creatinine Clearance: 98 mL/min (based on SCr of 0.84 mg/dL).     Intake/Output Summary (Last 24 hours) at 8/25/2020 1014  Last data filed at 8/25/2020 0935  Gross per 24 hour   Intake 2376 ml   Output 2000 ml   Net 376 ml

## 2020-08-25 NOTE — OP NOTE
PATIENT NAME: Glendy Leone  YOB: 1955  -  60 y.o. male  MRN: [id number]  DATE: 8/24/2020  BILLING #: [id number]     Surgeon(s):  Simin Galicia DPM      ASSISTANTS: Alessia Coto DPM PGY2, Vani Bravo MS4     PRE-OP DIAGNOSIS:   1. TMA stump infection, left foot  2. Surgical wound dehiscence, left foot  3. Osteomyelitis, left foot  4. Type 2 DM with peripheral neuropathy     POST-OP DIAGNOSIS: Same as above.     PROCEDURE:   1. Incision and drainage, left foot  2. Revision of transmetatarsal amputation, left foot     ANESTHESIA: General with local (13 cc of 1:1 mixture 1% lidocaine plain and 0.5% marcaine plain)     HEMOSTASIS: Pneumatic ankle left tourniquet @ 250 mmHg for 40 minutes.     ESTIMATED BLOOD LOSS: Less than 10 cc.     MATERIALS: 3-0 nylon, 1/2 inch iodoform packing  * No implants in log *     INJECTABLES: none     SPECIMEN: bone for surgical path and cx, clearance fragment bone for surgical path and cx, post irrigation culture swabs  ID Type Source Tests Collected by Time Destination   1 : LEFT FOOT WOUND POST IRRIGATION Swab Foot CULTURE, WOUND, CULTURE, ANAEROBIC AND AEROBIC Simin Galicia DPM 8/24/2020 1923     A : LEFT FOOT BONE Bone Foot SURGICAL PATHOLOGY, CULTURE, ANAEROBIC AND AEROBIC Simin Galicia DPM 8/24/2020 1855     B : CLEARANCE FRAGMENT OF BONE FROM LEFT FOOT Bone Foot SURGICAL PATHOLOGY, CULTURE, ANAEROBIC AND AEROBIC JAJA Mcintosh 8/24/2020 7774           COMPLICATIONS: none     FINDINGS: All necrotic and fibrotic tissue debrided until bleeding tissue noted. No purulent drainage. Hard cortical bone.  Confirmed on xray     The patient was counseled at length about the risks of neema Covid-19 during their perioperative period and any recovery window from their procedure.  The patient was made aware that neema Covid-19  may worsen their prognosis for recovering from their procedure  and lend to a higher morbidity and/or mortality risk.  All material risks, benefits, and reasonable alternatives including postponing the procedure were discussed. The patient does wish to proceed with the procedure at this time. INDICATIONS FOR PROCEDURE: Jacob Nair is a 60 y.o. male had a previous TMA which underwent dehiscence at the surgical site. TMA stump site was infected and surgical debridement with revision of transmetatarsal amputation was recommended which the patient is amenable. In pre-op all risks and benefits  of the procedure were discussed at length prior to the patient signing the consent form . Consent is signed, witnessed, and placed in patient chart. The left foot was marked, antibiotics given on the floors, labs and images reviewed, NPO status confirmed. No guarantees were given or implied. PROCEDURE IN DETAIL: The patient was brought to the operating room and placed on the operating table in the supine position with a safety strap across the lap. A well-padded pneumatic ankle tourniquet was applied to the left ankle. After adequate sedation was given by the anesthesia team, a local block of 13 cc of 1:1 mixture of 1% lidocaine plain and 0.5% Marcaine plain was injected to the left ankle preoperatively. The surgical site was then scrubbed, prepped, and draped in the usual aseptic manner. A timeout was performed confirming the patient's identity, correct site, correct procedure, allergies, and preoperative antibiotics. An Esmarch bandage was then used to exsanguinate left foot and the tourniquet was inflated to 350 mmHg. Attention was directed to the prior TMA stump of the left foot. A fishmouth type incision was created using a #15 blade to freshen the wound edges. The incision was carried down to the level of bone taking care to ligate any bleeders encountered. The soft tissue was freed from the metatarsals at the level of the midshaft.   Metatarsals one through five were resected at the midshaft utilizing a sagittal saw recreating the normal parabola of the foot. The bone fragments and any soft tissue attachments from the foot were resected and passed from the table to be sent for culture and surgical pathology. Bone was noted to be of hard cortical bone. All necrotic and fibrotic tissue was debrided until healthy tissue was noted. No purulence was appreciated throughout the wound bed. X-ray was utilized to confirm proper parabola of the metatarsals. Some more bone from metatarsals 1 through 5 were resected and passed from the table to be sent as clearance fragments to both surgical pathology and culture. All rough edges of the bone were smoothed using a power rasp. The surgical site was then irrigated with 3 L of sterile normal saline via pulse lavage. Post irrigation culture swabs were obtained. Minimal bleeding was noted at this time. Tourniquet was then deflated and all bleeders were ligated. Once hemostasis was noted throughout the wound bed, the wound was irrigated again with normal sterile saline. Skin at the incision site was reapproximated with 3-0 nylon. Incision site was closed both medially and laterally in the central portion of the surgical site open for drainage. Incision site was dressed with 1/4 inch iodoform packing at the central aspect of the wound, Adaptic, 4 x 4's, Kerlix, Ace. The patient tolerated the procedure and anesthesia well and was transferred to the PACU with all vital signs stable and vascular status intact to the left foot and ankle. Following a period of postoperative monitoring, the patient will be transferred to the floor. Will follow up daily with patient. Patient will be nonweightbearing to the left foot. Instructed to keep dressing clean, dry, and intact.        Chavo Lay DPM   Podiatric Medicine & Surgery

## 2020-08-26 LAB
ABSOLUTE EOS #: 0.52 K/UL (ref 0–0.44)
ABSOLUTE IMMATURE GRANULOCYTE: 0.05 K/UL (ref 0–0.3)
ABSOLUTE LYMPH #: 2.04 K/UL (ref 1.1–3.7)
ABSOLUTE MONO #: 1.04 K/UL (ref 0.1–1.2)
ANION GAP SERPL CALCULATED.3IONS-SCNC: 8 MMOL/L (ref 9–17)
BASOPHILS # BLD: 1 % (ref 0–2)
BASOPHILS ABSOLUTE: 0.07 K/UL (ref 0–0.2)
BUN BLDV-MCNC: 18 MG/DL (ref 8–23)
BUN/CREAT BLD: 19 (ref 9–20)
C-REACTIVE PROTEIN: 54.8 MG/L (ref 0–5)
CALCIUM SERPL-MCNC: 8.7 MG/DL (ref 8.6–10.4)
CHLORIDE BLD-SCNC: 105 MMOL/L (ref 98–107)
CO2: 29 MMOL/L (ref 20–31)
CREAT SERPL-MCNC: 0.93 MG/DL (ref 0.7–1.2)
DIFFERENTIAL TYPE: ABNORMAL
EOSINOPHILS RELATIVE PERCENT: 5 % (ref 1–4)
GFR AFRICAN AMERICAN: >60 ML/MIN
GFR NON-AFRICAN AMERICAN: >60 ML/MIN
GFR SERPL CREATININE-BSD FRML MDRD: ABNORMAL ML/MIN/{1.73_M2}
GFR SERPL CREATININE-BSD FRML MDRD: ABNORMAL ML/MIN/{1.73_M2}
GLUCOSE BLD-MCNC: 173 MG/DL (ref 75–110)
GLUCOSE BLD-MCNC: 209 MG/DL (ref 75–110)
GLUCOSE BLD-MCNC: 231 MG/DL (ref 75–110)
GLUCOSE BLD-MCNC: 262 MG/DL (ref 75–110)
GLUCOSE BLD-MCNC: 335 MG/DL (ref 75–110)
GLUCOSE BLD-MCNC: 90 MG/DL (ref 70–99)
GLUCOSE BLD-MCNC: 90 MG/DL (ref 75–110)
HCT VFR BLD CALC: 32 % (ref 40.7–50.3)
HEMOGLOBIN: 9.7 G/DL (ref 13–17)
IMMATURE GRANULOCYTES: 1 %
LYMPHOCYTES # BLD: 21 % (ref 24–43)
MCH RBC QN AUTO: 29 PG (ref 25.2–33.5)
MCHC RBC AUTO-ENTMCNC: 30.3 G/DL (ref 28.4–34.8)
MCV RBC AUTO: 95.5 FL (ref 82.6–102.9)
MONOCYTES # BLD: 11 % (ref 3–12)
NRBC AUTOMATED: 0 PER 100 WBC
PDW BLD-RTO: 12.9 % (ref 11.8–14.4)
PLATELET # BLD: 363 K/UL (ref 138–453)
PLATELET ESTIMATE: ABNORMAL
PMV BLD AUTO: 9.5 FL (ref 8.1–13.5)
POTASSIUM SERPL-SCNC: 4.3 MMOL/L (ref 3.7–5.3)
RBC # BLD: 3.35 M/UL (ref 4.21–5.77)
RBC # BLD: ABNORMAL 10*6/UL
SEG NEUTROPHILS: 61 % (ref 36–65)
SEGMENTED NEUTROPHILS ABSOLUTE COUNT: 6.23 K/UL (ref 1.5–8.1)
SODIUM BLD-SCNC: 142 MMOL/L (ref 135–144)
WBC # BLD: 10 K/UL (ref 3.5–11.3)
WBC # BLD: ABNORMAL 10*3/UL

## 2020-08-26 PROCEDURE — 86140 C-REACTIVE PROTEIN: CPT

## 2020-08-26 PROCEDURE — 94761 N-INVAS EAR/PLS OXIMETRY MLT: CPT

## 2020-08-26 PROCEDURE — 6360000002 HC RX W HCPCS: Performed by: STUDENT IN AN ORGANIZED HEALTH CARE EDUCATION/TRAINING PROGRAM

## 2020-08-26 PROCEDURE — 80048 BASIC METABOLIC PNL TOTAL CA: CPT

## 2020-08-26 PROCEDURE — 1200000000 HC SEMI PRIVATE

## 2020-08-26 PROCEDURE — 82947 ASSAY GLUCOSE BLOOD QUANT: CPT

## 2020-08-26 PROCEDURE — 6370000000 HC RX 637 (ALT 250 FOR IP): Performed by: STUDENT IN AN ORGANIZED HEALTH CARE EDUCATION/TRAINING PROGRAM

## 2020-08-26 PROCEDURE — 36415 COLL VENOUS BLD VENIPUNCTURE: CPT

## 2020-08-26 PROCEDURE — 85025 COMPLETE CBC W/AUTO DIFF WBC: CPT

## 2020-08-26 PROCEDURE — 6360000002 HC RX W HCPCS: Performed by: FAMILY MEDICINE

## 2020-08-26 PROCEDURE — 6370000000 HC RX 637 (ALT 250 FOR IP): Performed by: FAMILY MEDICINE

## 2020-08-26 PROCEDURE — 2580000003 HC RX 258: Performed by: STUDENT IN AN ORGANIZED HEALTH CARE EDUCATION/TRAINING PROGRAM

## 2020-08-26 PROCEDURE — 99232 SBSQ HOSP IP/OBS MODERATE 35: CPT | Performed by: INTERNAL MEDICINE

## 2020-08-26 PROCEDURE — 99232 SBSQ HOSP IP/OBS MODERATE 35: CPT | Performed by: FAMILY MEDICINE

## 2020-08-26 PROCEDURE — 94640 AIRWAY INHALATION TREATMENT: CPT

## 2020-08-26 RX ORDER — MORPHINE SULFATE 2 MG/ML
2 INJECTION, SOLUTION INTRAMUSCULAR; INTRAVENOUS NIGHTLY PRN
Status: DISCONTINUED | OUTPATIENT
Start: 2020-08-26 | End: 2020-09-03 | Stop reason: HOSPADM

## 2020-08-26 RX ADMIN — PROMETHAZINE HYDROCHLORIDE 12.5 MG: 12.5 TABLET ORAL at 23:34

## 2020-08-26 RX ADMIN — DOCUSATE SODIUM 100 MG: 100 CAPSULE, LIQUID FILLED ORAL at 07:57

## 2020-08-26 RX ADMIN — INSULIN GLARGINE 70 UNITS: 100 INJECTION, SOLUTION SUBCUTANEOUS at 21:09

## 2020-08-26 RX ADMIN — PIPERACILLIN AND TAZOBACTAM 3.38 G: 3; .375 INJECTION, POWDER, LYOPHILIZED, FOR SOLUTION INTRAVENOUS at 16:00

## 2020-08-26 RX ADMIN — METFORMIN HYDROCHLORIDE 500 MG: 500 TABLET ORAL at 07:53

## 2020-08-26 RX ADMIN — DILTIAZEM HYDROCHLORIDE 60 MG: 60 TABLET, FILM COATED ORAL at 07:58

## 2020-08-26 RX ADMIN — VANCOMYCIN HYDROCHLORIDE 1250 MG: 1.25 INJECTION, POWDER, LYOPHILIZED, FOR SOLUTION INTRAVENOUS at 08:00

## 2020-08-26 RX ADMIN — NORTRIPTYLINE HYDROCHLORIDE 50 MG: 25 CAPSULE ORAL at 20:03

## 2020-08-26 RX ADMIN — DILTIAZEM HYDROCHLORIDE 60 MG: 60 TABLET, FILM COATED ORAL at 18:04

## 2020-08-26 RX ADMIN — PIPERACILLIN AND TAZOBACTAM 3.38 G: 3; .375 INJECTION, POWDER, LYOPHILIZED, FOR SOLUTION INTRAVENOUS at 22:32

## 2020-08-26 RX ADMIN — METFORMIN HYDROCHLORIDE 500 MG: 500 TABLET ORAL at 18:03

## 2020-08-26 RX ADMIN — OXYCODONE HYDROCHLORIDE AND ACETAMINOPHEN 2 TABLET: 5; 325 TABLET ORAL at 12:31

## 2020-08-26 RX ADMIN — FAMOTIDINE 20 MG: 20 TABLET ORAL at 20:03

## 2020-08-26 RX ADMIN — OXYCODONE HYDROCHLORIDE AND ACETAMINOPHEN 2 TABLET: 5; 325 TABLET ORAL at 22:32

## 2020-08-26 RX ADMIN — ATORVASTATIN CALCIUM 40 MG: 40 TABLET, FILM COATED ORAL at 07:57

## 2020-08-26 RX ADMIN — INSULIN LISPRO 9 UNITS: 100 INJECTION, SOLUTION INTRAVENOUS; SUBCUTANEOUS at 17:57

## 2020-08-26 RX ADMIN — INSULIN LISPRO 3 UNITS: 100 INJECTION, SOLUTION INTRAVENOUS; SUBCUTANEOUS at 21:12

## 2020-08-26 RX ADMIN — IPRATROPIUM BROMIDE AND ALBUTEROL SULFATE 3 ML: .5; 3 SOLUTION RESPIRATORY (INHALATION) at 19:22

## 2020-08-26 RX ADMIN — APIXABAN 5 MG: 5 TABLET, FILM COATED ORAL at 20:03

## 2020-08-26 RX ADMIN — SODIUM CHLORIDE: 9 INJECTION, SOLUTION INTRAVENOUS at 03:40

## 2020-08-26 RX ADMIN — Medication 2 MG: at 23:34

## 2020-08-26 RX ADMIN — IPRATROPIUM BROMIDE AND ALBUTEROL SULFATE 3 ML: .5; 3 SOLUTION RESPIRATORY (INHALATION) at 11:03

## 2020-08-26 RX ADMIN — FAMOTIDINE 20 MG: 20 TABLET ORAL at 07:55

## 2020-08-26 RX ADMIN — ASPIRIN 81 MG 81 MG: 81 TABLET ORAL at 07:55

## 2020-08-26 RX ADMIN — PIPERACILLIN AND TAZOBACTAM 3.38 G: 3; .375 INJECTION, POWDER, LYOPHILIZED, FOR SOLUTION INTRAVENOUS at 06:30

## 2020-08-26 RX ADMIN — APIXABAN 5 MG: 5 TABLET, FILM COATED ORAL at 07:54

## 2020-08-26 RX ADMIN — DILTIAZEM HYDROCHLORIDE 60 MG: 60 TABLET, FILM COATED ORAL at 12:21

## 2020-08-26 RX ADMIN — Medication 2 MG: at 20:03

## 2020-08-26 RX ADMIN — INSULIN LISPRO 3 UNITS: 100 INJECTION, SOLUTION INTRAVENOUS; SUBCUTANEOUS at 12:32

## 2020-08-26 RX ADMIN — OXYCODONE HYDROCHLORIDE AND ACETAMINOPHEN 2 TABLET: 5; 325 TABLET ORAL at 03:40

## 2020-08-26 RX ADMIN — OXYCODONE HYDROCHLORIDE AND ACETAMINOPHEN 2 TABLET: 5; 325 TABLET ORAL at 18:01

## 2020-08-26 RX ADMIN — IPRATROPIUM BROMIDE AND ALBUTEROL SULFATE 3 ML: .5; 3 SOLUTION RESPIRATORY (INHALATION) at 14:56

## 2020-08-26 RX ADMIN — SODIUM CHLORIDE: 9 INJECTION, SOLUTION INTRAVENOUS at 18:09

## 2020-08-26 RX ADMIN — OXYCODONE HYDROCHLORIDE AND ACETAMINOPHEN 2 TABLET: 5; 325 TABLET ORAL at 07:51

## 2020-08-26 RX ADMIN — IPRATROPIUM BROMIDE AND ALBUTEROL SULFATE 3 ML: .5; 3 SOLUTION RESPIRATORY (INHALATION) at 07:24

## 2020-08-26 ASSESSMENT — PAIN DESCRIPTION - PAIN TYPE
TYPE: CHRONIC PAIN
TYPE: CHRONIC PAIN;SURGICAL PAIN
TYPE: CHRONIC PAIN
TYPE: CHRONIC PAIN

## 2020-08-26 ASSESSMENT — PAIN SCALES - GENERAL
PAINLEVEL_OUTOF10: 8
PAINLEVEL_OUTOF10: 5
PAINLEVEL_OUTOF10: 7
PAINLEVEL_OUTOF10: 8
PAINLEVEL_OUTOF10: 5
PAINLEVEL_OUTOF10: 5
PAINLEVEL_OUTOF10: 7

## 2020-08-26 ASSESSMENT — ENCOUNTER SYMPTOMS
RHINORRHEA: 0
SINUS PRESSURE: 0
CHEST TIGHTNESS: 0
CHOKING: 0
DIARRHEA: 0
CONSTIPATION: 0
SHORTNESS OF BREATH: 0
ABDOMINAL PAIN: 0
WHEEZING: 0
VOICE CHANGE: 0
VOMITING: 0
NAUSEA: 0
COUGH: 0
BACK PAIN: 0
RESPIRATORY NEGATIVE: 1
ALLERGIC/IMMUNOLOGIC NEGATIVE: 1
GASTROINTESTINAL NEGATIVE: 1

## 2020-08-26 ASSESSMENT — PAIN DESCRIPTION - LOCATION
LOCATION: GENERALIZED;FOOT

## 2020-08-26 ASSESSMENT — PAIN DESCRIPTION - ORIENTATION
ORIENTATION: LEFT

## 2020-08-26 ASSESSMENT — PAIN DESCRIPTION - PROGRESSION
CLINICAL_PROGRESSION: NOT CHANGED

## 2020-08-26 ASSESSMENT — PAIN DESCRIPTION - ONSET
ONSET: ON-GOING

## 2020-08-26 ASSESSMENT — PAIN DESCRIPTION - FREQUENCY
FREQUENCY: CONTINUOUS

## 2020-08-26 ASSESSMENT — PAIN DESCRIPTION - DESCRIPTORS
DESCRIPTORS: ACHING;SORE

## 2020-08-26 ASSESSMENT — PAIN - FUNCTIONAL ASSESSMENT: PAIN_FUNCTIONAL_ASSESSMENT: PREVENTS OR INTERFERES SOME ACTIVE ACTIVITIES AND ADLS
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
## 2020-08-26 NOTE — PROGRESS NOTES
Progress Note  Podiatric Medicine and Surgery     Subjective     CC: TMA stump infection, left foot    POD#2 s/p left TMA revision (DOS: 8/24/20)  Patient seen and evaluated at bedside  Pain is been well controlled  Afebrile, slightly hypertensive and tachycardic  CRP uptrending 31.4>54.8  WBC downtrending 12.6>10.0      HPI :  Reyes Ruggiero is a 60 y.o. male seen [facility] for surgical wound dehiscence and worsening infection of TMA stump of the left foot. Patient underwent TMA with flap closure of left foot on 08/05/2020. Patient was then admitted to [facility] however after discharge from hospital patient never followed up with our clinic. He has not received any wound care or evaluation of the TMA stump since his discharge from the hospital.  Patient signed himself out from the [facility] [place] on Wednesday, 08/19/2020. Due to [place] signout patient was not given any medications or necessary supplies. Patient states he has been walking on the left lower extremity with a posterior splint intact despite nonweightbearing instruction. Patient relates \" not feeling so well\" the past couple days along with an increase in pain and malodor coming from the left TMA stump which prompted him to present to the ED today. Upon infectious work-up in the ED patient was found to have a blood glucose of 484 mg/dL, WBC of 13.7, ESR of 74. X-rays of the left foot were also obtained which demonstrated cortical irregularities to the distal third metatarsal suspicious for early osteomyelitis, small pocket of soft tissue emphysema dorsal distal aspect of the foot consistent with gas-forming infection. ROS: Denies N/V/F/C/SOB/CP. Otherwise negative except at stated in the HPI.      Medications:  Scheduled Meds:   polyethylene glycol  17 g Oral BID    vancomycin  1,250 mg Intravenous Q12H    vancomycin (VANCOCIN) intermittent dosing (placeholder)   Other RX Placeholder    insulin lispro  0-18 Units Subcutaneous TID     insulin 54.8*        BMP:  Recent Labs     08/26/20  0549      K 4.3      CO2 29   BUN 18   CREATININE 0.93   GLUCOSE 90   CALCIUM 8.7        Coags:  No results for input(s): APTT, PROT, INR in the last 72 hours. Lab Results   Component Value Date    SEDRATE 74 (H) 08/22/2020     Recent Labs     08/24/20  0714 08/26/20  0549   CRP 31.4* 54.8*       Physical Exam:  Right-sided BKA     Vascular: DP and PT pulses are palpable. CFT brisk to flap edges. Hair growth is absent to the level of the digits. Mild nonpitting edema distal stump.       Neuro: Saph/sural/SP/DP/plantar sensation absent to light touch.     Musculoskeletal: Muscle strength is adequate ROM, adequate strength to all lower extremity muscle groups. Compartments are soft compressible. gross deformity is present with right-sided BKA and left TMA stump. No pain with compression of posterior calf.     Dermatologic: Incision of the TMA site well coapted with an opening centrally. Wound probes deep plantar medially approximately 4.0cm. Scant sanguinous drainage expressed, no purulence. No periwound erythema, slight increase in warmth. No malodor. No fluctuance or obvious drainable fluid collection. Clinical Images:                  Imaging:   XR FOOT LEFT (MIN 3 VIEWS)   Final Result   Interval decreased air within the caudal soft tissues overlying the left foot   mid metatarsal amputation site suggesting interval debridement/drainage. XR FOOT LEFT (2 VIEWS)   Final Result   Interval increased distal soft tissue swelling and soft tissue gas concerning   for presence of cellulitis with gas-forming organism. XR FOOT LEFT (MIN 3 VIEWS)   Final Result   Skin defect with soft tissue swelling and lucencies of the amputation stump   likely representing cellulitis with gas-forming organisms. Underlying 3rd   metatarsal shows subtle distal marginal irregularity on the oblique view.    This could be accentuated by the soft tissue lucencies nonetheless concerning   for osteomyelitis. Cultures:   Cx 8/22: nonlactose fermenting GNR, proteus, GPC, GNR  Cx 8/24 pre irrigation (bone): non lactose fermenting GNR, GPC  Cx 8/24 post irrigation (bone): non lactose fermenting GNR  Cx 8/24 swabs post irrigation: non lactose fermenting GNR    Assessment   Flip Carrion is a 60 y.o. male with   1. TMA revision, left foot (DOS: 8/24/20)  2. Surgical wound dehiscence, left foot  3. Cellulitis, left foot  4. Possible osteomyelitis, left foot  5. Diabetes mellitus type 2 with associated peripheral neuropathy  6. Noncompliance    Principal Problem:    Diabetic foot infection ([facility]  Active Problems:    Type 2 diabetes mellitus with diabetic polyneuropathy, with long-term current use of insulin ([facility])    Diabetic polyneuropathy ([facility])    Tobacco dependence    Edentulous    COPD without exacerbation ([facility])    Dyslipidemia    PVD (peripheral vascular disease) ([facility])    Below-knee amputation of right lower extremity ([facility]    Essential hypertension    Uncontrolled type 2 diabetes mellitus with hyperglycemia ([facility]    Noncompliance  Resolved Problems:    * No resolved hospital problems. *       Plan     · Patient examined and evaluated at bedside   · Treatment options discussed in detail with the patient  · Post-op x-rays reviewed- Decreased soft tissue emphysema appreciated consistent with operative site  · Medicial management per primary  · Anbx: vanc/zosyn, ID following. Appreciate recs  · Patient is POD#2 of left foot TMA revision. Central opening is granulating in. Will continue to monitor. May consider VAC if no further improvement. · Dressing applied to Left lower extremity: 1/4 inch iodoform, DSD, Ace.    · Strict nonweightbearing to the left lower extremity   · Discussed with JAJA SotoM   Podiatric Medicine & Surgery   8/26/2020 at 10:00 AM

## 2020-08-26 NOTE — PLAN OF CARE
Problem: Falls - Risk of:  Goal: Will remain free from falls  Description: Will remain free from falls  8/26/2020 0029 by Leslie Parsons RN  Outcome: Ongoing  8/25/2020 1318 by Sumit Kerr RN  Outcome: Ongoing  Goal: Absence of physical injury  Description: Absence of physical injury  8/26/2020 0029 by Leslie Parsons RN  Outcome: Ongoing  8/25/2020 1318 by Sumit Kerr RN  Outcome: Ongoing     Problem: Skin Integrity:  Goal: Will show no infection signs and symptoms  Description: Will show no infection signs and symptoms  8/26/2020 0029 by Leslie Parsons RN  Outcome: Ongoing  8/25/2020 1318 by Sumit Kerr RN  Outcome: Ongoing  Goal: Absence of new skin breakdown  Description: Absence of new skin breakdown  8/26/2020 0029 by Leslie Parsons RN  Outcome: Ongoing  8/25/2020 1318 by Sumit Kerr RN  Outcome: Ongoing  Goal: Risk for impaired skin integrity will decrease  Description: Risk for impaired skin integrity will decrease  8/26/2020 0029 by Leslie Parsons RN  Outcome: Ongoing  8/25/2020 1318 by Sumit Kerr RN  Outcome: Ongoing     Problem: Pain:  Goal: Pain level will decrease  Description: Pain level will decrease  8/26/2020 0029 by Leslie Parsons RN  Outcome: Ongoing  8/25/2020 1318 by Sumit Kerr RN  Outcome: Ongoing  Goal: Control of acute pain  Description: Control of acute pain  8/26/2020 0029 by Leslie Parsons RN  Outcome: Ongoing  8/25/2020 1318 by Sumit Kerr RN  Outcome: Ongoing  Goal: Control of chronic pain  Description: Control of chronic pain  Outcome: Ongoing     Problem: Tobacco Use:  Goal: Inpatient tobacco use cessation counseling participation  Description: Inpatient tobacco use cessation counseling participation  Outcome: Ongoing     Problem:  Activity:  Goal: Risk for activity intolerance will decrease  Description: Risk for activity intolerance will decrease  8/26/2020 0029 by Leslie Parsons RN  Outcome: Ongoing  8/25/2020 1318 by Sumit Kerr RN  Outcome: Ongoing     Problem: Coping:  Goal: Ability to adjust to condition or change in health will improve  Description: Ability to adjust to condition or change in health will improve  Outcome: Ongoing     Problem: Fluid Volume:  Goal: Ability to maintain a balanced intake and output will improve  Description: Ability to maintain a balanced intake and output will improve  Outcome: Ongoing     Problem: Health Behavior:  Goal: Ability to identify and utilize available resources and services will improve  Description: Ability to identify and utilize available resources and services will improve  Outcome: Ongoing  Goal: Ability to manage health-related needs will improve  Description: Ability to manage health-related needs will improve  Outcome: Ongoing     Problem: Metabolic:  Goal: Ability to maintain appropriate glucose levels will improve  Description: Ability to maintain appropriate glucose levels will improve  Outcome: Ongoing     Problem: Nutritional:  Goal: Maintenance of adequate nutrition will improve  Description: Maintenance of adequate nutrition will improve  Outcome: Ongoing  Goal: Progress toward achieving an optimal weight will improve  Description: Progress toward achieving an optimal weight will improve  Outcome: Ongoing     Problem: Physical Regulation:  Goal: Complications related to the disease process, condition or treatment will be avoided or minimized  Description: Complications related to the disease process, condition or treatment will be avoided or minimized  Outcome: Ongoing  Goal: Diagnostic test results will improve  Description: Diagnostic test results will improve  Outcome: Ongoing     Problem: Tissue Perfusion:  Goal: Adequacy of tissue perfusion will improve  Description: Adequacy of tissue perfusion will improve  Outcome: Ongoing

## 2020-08-26 NOTE — PROGRESS NOTES
the left foot with Achilles tendon lengthening on 2020. The patient was discharged to a rehabilitation facility on 2020 on oral antimicrobial therapy with Augmentin and doxycycline for a week. The patient states that he never had any wound care done in the time since his discharge and he started to feel \"funny\" though he cannot really specify what this means exactly and ended up going back into the emergency room. The patient had dressing change done which showed devitalized tissue as well as an x-ray that showed some gaseous soft tissue changes and the patient was seen by the podiatry service had a debridement done at bedside. I was asked to evaluate if there was concern for osteomyelitis as the open wounds did have 3 metatarsal bones exposed. The patient was taken to the operating room 2020  The patient had all necrotic and fibrotic tissue debrided until bleeding tissue was noted with no purulent drainage and hard cortical bone. Current evaluation:2020    /61   Pulse 89   Temp 97.5 °F (36.4 °C) (Axillary)   Resp 16   Ht 6' (1.829 m)   Wt 169 lb 3.2 oz (76.7 kg)   SpO2 100%   BMI 22.95 kg/m²     Temperature Range: Temp: 97.5 °F (36.4 °C) Temp  Av.7 °F (36.5 °C)  Min: 97.5 °F (36.4 °C)  Max: 98.2 °F (36.8 °C)  The patient is seen and evaluated at bedside he reports that he is feeling much better today as compared to yesterday. He reports multiple episodes of nausea and vomiting yesterday which have resolved today. He has been eating very little today as he was concerned that he might vomit again. He does not have any subjective fevers or chills. The headaches have resolved    Review of Systems   Constitutional: Negative. Respiratory: Negative. Cardiovascular: Negative. Gastrointestinal: Negative. Genitourinary: Negative. Musculoskeletal: Negative. Pain at the surgical site   Allergic/Immunologic: Negative. Neurological: Negative. Physical Examination :     Physical Exam  Constitutional:       Appearance: He is well-developed. HENT:      Head: Normocephalic and atraumatic. Neck:      Musculoskeletal: Normal range of motion and neck supple. Cardiovascular:      Rate and Rhythm: Normal rate. Heart sounds: Normal heart sounds. No friction rub. No gallop. Pulmonary:      Effort: Pulmonary effort is normal.      Breath sounds: Normal breath sounds. No wheezing. Abdominal:      General: Bowel sounds are normal.      Palpations: Abdomen is soft. There is no mass. Tenderness: There is no abdominal tenderness. Musculoskeletal:      Comments: Right below the knee amputation   Lymphadenopathy:      Cervical: No cervical adenopathy. Skin:     General: Skin is warm and dry. Comments: There is a dressing in the left foot   Neurological:      Mental Status: He is alert and oriented to person, place, and time. Laboratory data:   I have independently reviewed the followinglabs:  CBC with Differential:   Recent Labs     08/26/20  0549   WBC 10.0   HGB 9.7*   HCT 32.0*      LYMPHOPCT 21*   MONOPCT 11     BMP:   Recent Labs     08/26/20  0549      K 4.3      CO2 29   BUN 18   CREATININE 0.93     Hepatic Function Panel: No results for input(s): PROT, LABALBU, BILIDIR, IBILI, BILITOT, ALKPHOS, ALT, AST in the last 72 hours.       No results found for: PROCAL  Lab Results   Component Value Date    CRP 54.8 08/26/2020    CRP 31.4 08/24/2020    CRP 27.4 08/22/2020     Lab Results   Component Value Date    SEDRATE 74 (H) 08/22/2020       Lab Results   Component Value Date    DDIMER 0.39 06/29/2020    DDIMER 1.63 12/20/2017    DDIMER 0.68 12/25/2011    FERRITIN 64 01/25/2014       Recent Labs     08/24/20  0714   [facility] 16.4       Imaging Studies:   No new imaging    Cultures:     Culture, Blood 1 [[id number]]   Collected: 08/22/20 1711    Order Status: Completed  Specimen: Blood  Updated: 08/26/20 FERMENTING GRAM NEGATIVE RODS DIFFERENT MORPHOLOGY MODERATE GROWTHAbnormal        PRESUMPTIVE ID: GROUP D ENTEROCOCCUS MODERATE GROWTH      This is a mixed culture of organisms, which may represent colonization or contamination.  Notify the laboratory within 7 days for further workup.  Susceptibilities have not been performed. Abnormal         Medications:      polyethylene glycol  17 g Oral BID    vancomycin  1,250 mg Intravenous Q12H    vancomycin (VANCOCIN) intermittent dosing (placeholder)   Other RX Placeholder    insulin lispro  0-18 Units Subcutaneous TID WC    insulin lispro  0-9 Units Subcutaneous Nightly    apixaban  5 mg Oral BID    aspirin  81 mg Oral Daily    atorvastatin  40 mg Oral Daily    dilTIAZem  60 mg Oral 4x Daily    famotidine  20 mg Oral BID    ipratropium-albuterol  3 mL Inhalation Q4H WA    metFORMIN  500 mg Oral BID WC    nortriptyline  50 mg Oral Nightly    sodium chloride flush  10 mL Intravenous 2 times per day    piperacillin-tazobactam  3.375 g Intravenous Q8H    insulin glargine  70 Units Subcutaneous Nightly           [facility]  Gino Toure MD  [employer] messaging  OFFICE: [phone]      Electronically signed by Gino Toure MD on 8/26/2020 at 3:24 PM  Thank you for allowing us to participate in the care of this patient. Please call with questions. This note iscreated with the assistance of a speech recognition program.  While intending to generate a document that actually reflects the content of the visit, the document can still have some errors including those of syntax andsound a like substitutions which may escape proof reading. In such instances, actual meaning can be extrapolated by contextual diversion.

## 2020-08-26 NOTE — PLAN OF CARE
Problem: Falls - Risk of:  Goal: Will remain free from falls  Description: Will remain free from falls  Outcome: Ongoing     Problem: Falls - Risk of:  Goal: Absence of physical injury  Description: Absence of physical injury  Outcome: Ongoing     Problem: Skin Integrity:  Goal: Will show no infection signs and symptoms  Description: Will show no infection signs and symptoms  Outcome: Ongoing     Problem: Skin Integrity:  Goal: Absence of new skin breakdown  Description: Absence of new skin breakdown  Outcome: Ongoing          Problem: Coping:  Goal: Ability to adjust to condition or change in health will improve  Description: Ability to adjust to condition or change in health will improve  Outcome: Ongoing     Problem: Health Behavior:  Goal: Ability to manage health-related needs will improve  Description: Ability to manage health-related needs will improve  Outcome: Ongoing     Problem: Metabolic:  Goal: Ability to maintain appropriate glucose levels will improve  Description: Ability to maintain appropriate glucose levels will improve  Outcome: Ongoing     Problem: Nutritional:  Goal: Maintenance of adequate nutrition will improve  Description: Maintenance of adequate nutrition will improve  Outcome: Ongoing     Problem: Physical Regulation:  Goal: Complications related to the disease process, condition or treatment will be avoided or minimized  Description: Complications related to the disease process, condition or treatment will be avoided or minimized  Outcome: Ongoing   Problem: Pain:  Goal: Control of chronic pain  Description: Control of chronic pain  Outcome: Ongoing
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
## 2020-08-27 LAB
CULTURE: ABNORMAL
DIRECT EXAM: ABNORMAL
GLUCOSE BLD-MCNC: 131 MG/DL (ref 75–110)
GLUCOSE BLD-MCNC: 165 MG/DL (ref 75–110)
GLUCOSE BLD-MCNC: 242 MG/DL (ref 75–110)
GLUCOSE BLD-MCNC: 271 MG/DL (ref 75–110)
Lab: ABNORMAL
Lab: ABNORMAL
SPECIMEN DESCRIPTION: ABNORMAL
SPECIMEN DESCRIPTION: ABNORMAL

## 2020-08-27 PROCEDURE — 97116 GAIT TRAINING THERAPY: CPT

## 2020-08-27 PROCEDURE — 99232 SBSQ HOSP IP/OBS MODERATE 35: CPT | Performed by: INTERNAL MEDICINE

## 2020-08-27 PROCEDURE — 99232 SBSQ HOSP IP/OBS MODERATE 35: CPT | Performed by: FAMILY MEDICINE

## 2020-08-27 PROCEDURE — 6370000000 HC RX 637 (ALT 250 FOR IP): Performed by: NURSE PRACTITIONER

## 2020-08-27 PROCEDURE — 94640 AIRWAY INHALATION TREATMENT: CPT

## 2020-08-27 PROCEDURE — 6370000000 HC RX 637 (ALT 250 FOR IP): Performed by: STUDENT IN AN ORGANIZED HEALTH CARE EDUCATION/TRAINING PROGRAM

## 2020-08-27 PROCEDURE — 1200000000 HC SEMI PRIVATE

## 2020-08-27 PROCEDURE — 97110 THERAPEUTIC EXERCISES: CPT

## 2020-08-27 PROCEDURE — 6360000002 HC RX W HCPCS: Performed by: FAMILY MEDICINE

## 2020-08-27 PROCEDURE — 6370000000 HC RX 637 (ALT 250 FOR IP): Performed by: INTERNAL MEDICINE

## 2020-08-27 PROCEDURE — 6360000002 HC RX W HCPCS: Performed by: STUDENT IN AN ORGANIZED HEALTH CARE EDUCATION/TRAINING PROGRAM

## 2020-08-27 PROCEDURE — 97162 PT EVAL MOD COMPLEX 30 MIN: CPT

## 2020-08-27 PROCEDURE — 82947 ASSAY GLUCOSE BLOOD QUANT: CPT

## 2020-08-27 PROCEDURE — 94761 N-INVAS EAR/PLS OXIMETRY MLT: CPT

## 2020-08-27 PROCEDURE — 6370000000 HC RX 637 (ALT 250 FOR IP): Performed by: FAMILY MEDICINE

## 2020-08-27 PROCEDURE — 2580000003 HC RX 258: Performed by: STUDENT IN AN ORGANIZED HEALTH CARE EDUCATION/TRAINING PROGRAM

## 2020-08-27 RX ORDER — LINEZOLID 600 MG/1
600 TABLET, FILM COATED ORAL EVERY 12 HOURS SCHEDULED
Status: DISCONTINUED | OUTPATIENT
Start: 2020-08-27 | End: 2020-09-03 | Stop reason: HOSPADM

## 2020-08-27 RX ORDER — DOXYCYCLINE 100 MG/1
100 CAPSULE ORAL EVERY 12 HOURS SCHEDULED
Status: DISCONTINUED | OUTPATIENT
Start: 2020-08-27 | End: 2020-08-27

## 2020-08-27 RX ORDER — MAGNESIUM HYDROXIDE/ALUMINUM HYDROXICE/SIMETHICONE 120; 1200; 1200 MG/30ML; MG/30ML; MG/30ML
30 SUSPENSION ORAL EVERY 6 HOURS PRN
Status: DISCONTINUED | OUTPATIENT
Start: 2020-08-27 | End: 2020-09-03 | Stop reason: HOSPADM

## 2020-08-27 RX ORDER — CIPROFLOXACIN 500 MG/1
500 TABLET, FILM COATED ORAL EVERY 12 HOURS SCHEDULED
Status: DISCONTINUED | OUTPATIENT
Start: 2020-08-27 | End: 2020-09-03 | Stop reason: HOSPADM

## 2020-08-27 RX ADMIN — INSULIN GLARGINE 70 UNITS: 100 INJECTION, SOLUTION SUBCUTANEOUS at 21:20

## 2020-08-27 RX ADMIN — POLYETHYLENE GLYCOL (3350) 17 G: 17 POWDER, FOR SOLUTION ORAL at 21:20

## 2020-08-27 RX ADMIN — OXYCODONE HYDROCHLORIDE AND ACETAMINOPHEN 2 TABLET: 5; 325 TABLET ORAL at 18:15

## 2020-08-27 RX ADMIN — INSULIN LISPRO 3 UNITS: 100 INJECTION, SOLUTION INTRAVENOUS; SUBCUTANEOUS at 21:20

## 2020-08-27 RX ADMIN — DILTIAZEM HYDROCHLORIDE 60 MG: 60 TABLET, FILM COATED ORAL at 13:03

## 2020-08-27 RX ADMIN — PIPERACILLIN AND TAZOBACTAM 3.38 G: 3; .375 INJECTION, POWDER, LYOPHILIZED, FOR SOLUTION INTRAVENOUS at 05:32

## 2020-08-27 RX ADMIN — DILTIAZEM HYDROCHLORIDE 60 MG: 60 TABLET, FILM COATED ORAL at 18:17

## 2020-08-27 RX ADMIN — INSULIN LISPRO 9 UNITS: 100 INJECTION, SOLUTION INTRAVENOUS; SUBCUTANEOUS at 18:20

## 2020-08-27 RX ADMIN — APIXABAN 5 MG: 5 TABLET, FILM COATED ORAL at 09:22

## 2020-08-27 RX ADMIN — FAMOTIDINE 20 MG: 20 TABLET ORAL at 10:56

## 2020-08-27 RX ADMIN — ATORVASTATIN CALCIUM 40 MG: 40 TABLET, FILM COATED ORAL at 09:21

## 2020-08-27 RX ADMIN — ALUMINUM HYDROXIDE, MAGNESIUM HYDROXIDE, AND SIMETHICONE 30 ML: 200; 200; 20 SUSPENSION ORAL at 05:08

## 2020-08-27 RX ADMIN — METFORMIN HYDROCHLORIDE 500 MG: 500 TABLET ORAL at 18:17

## 2020-08-27 RX ADMIN — APIXABAN 5 MG: 5 TABLET, FILM COATED ORAL at 20:57

## 2020-08-27 RX ADMIN — IPRATROPIUM BROMIDE AND ALBUTEROL SULFATE 3 ML: .5; 3 SOLUTION RESPIRATORY (INHALATION) at 19:54

## 2020-08-27 RX ADMIN — OXYCODONE HYDROCHLORIDE AND ACETAMINOPHEN 2 TABLET: 5; 325 TABLET ORAL at 13:28

## 2020-08-27 RX ADMIN — DILTIAZEM HYDROCHLORIDE 60 MG: 60 TABLET, FILM COATED ORAL at 09:34

## 2020-08-27 RX ADMIN — OXYCODONE HYDROCHLORIDE AND ACETAMINOPHEN 2 TABLET: 5; 325 TABLET ORAL at 03:01

## 2020-08-27 RX ADMIN — ONDANSETRON 4 MG: 2 INJECTION INTRAMUSCULAR; INTRAVENOUS at 23:42

## 2020-08-27 RX ADMIN — METFORMIN HYDROCHLORIDE 500 MG: 500 TABLET ORAL at 09:23

## 2020-08-27 RX ADMIN — SODIUM CHLORIDE: 9 INJECTION, SOLUTION INTRAVENOUS at 06:59

## 2020-08-27 RX ADMIN — SODIUM CHLORIDE, PRESERVATIVE FREE 10 ML: 5 INJECTION INTRAVENOUS at 21:13

## 2020-08-27 RX ADMIN — OXYCODONE HYDROCHLORIDE AND ACETAMINOPHEN 2 TABLET: 5; 325 TABLET ORAL at 09:20

## 2020-08-27 RX ADMIN — NORTRIPTYLINE HYDROCHLORIDE 50 MG: 25 CAPSULE ORAL at 20:57

## 2020-08-27 RX ADMIN — SODIUM CHLORIDE: 9 INJECTION, SOLUTION INTRAVENOUS at 23:08

## 2020-08-27 RX ADMIN — CIPROFLOXACIN 500 MG: 500 TABLET ORAL at 20:57

## 2020-08-27 RX ADMIN — IPRATROPIUM BROMIDE AND ALBUTEROL SULFATE 3 ML: .5; 3 SOLUTION RESPIRATORY (INHALATION) at 15:29

## 2020-08-27 RX ADMIN — Medication 2 MG: at 01:37

## 2020-08-27 RX ADMIN — IPRATROPIUM BROMIDE AND ALBUTEROL SULFATE 3 ML: .5; 3 SOLUTION RESPIRATORY (INHALATION) at 07:19

## 2020-08-27 RX ADMIN — Medication 2 MG: at 21:12

## 2020-08-27 RX ADMIN — IPRATROPIUM BROMIDE AND ALBUTEROL SULFATE 3 ML: .5; 3 SOLUTION RESPIRATORY (INHALATION) at 11:01

## 2020-08-27 RX ADMIN — INSULIN LISPRO 3 UNITS: 100 INJECTION, SOLUTION INTRAVENOUS; SUBCUTANEOUS at 09:34

## 2020-08-27 RX ADMIN — ASPIRIN 81 MG 81 MG: 81 TABLET ORAL at 09:23

## 2020-08-27 RX ADMIN — Medication 2 MG: at 04:31

## 2020-08-27 RX ADMIN — LINEZOLID 600 MG: 600 TABLET, FILM COATED ORAL at 20:56

## 2020-08-27 RX ADMIN — FAMOTIDINE 20 MG: 20 TABLET ORAL at 20:57

## 2020-08-27 ASSESSMENT — PAIN DESCRIPTION - DESCRIPTORS
DESCRIPTORS: ACHING;SORE

## 2020-08-27 ASSESSMENT — PAIN SCALES - GENERAL
PAINLEVEL_OUTOF10: 7
PAINLEVEL_OUTOF10: 8
PAINLEVEL_OUTOF10: 10
PAINLEVEL_OUTOF10: 6
PAINLEVEL_OUTOF10: 6
PAINLEVEL_OUTOF10: 8
PAINLEVEL_OUTOF10: 6
PAINLEVEL_OUTOF10: 8
PAINLEVEL_OUTOF10: 8
PAINLEVEL_OUTOF10: 6
PAINLEVEL_OUTOF10: 8
PAINLEVEL_OUTOF10: 10
PAINLEVEL_OUTOF10: 7
PAINLEVEL_OUTOF10: 8

## 2020-08-27 ASSESSMENT — ENCOUNTER SYMPTOMS
WHEEZING: 0
CONSTIPATION: 0
BACK PAIN: 0
VOICE CHANGE: 0
NAUSEA: 0
CHEST TIGHTNESS: 0
SINUS PRESSURE: 0
ALLERGIC/IMMUNOLOGIC NEGATIVE: 1
ABDOMINAL PAIN: 1
RHINORRHEA: 0
VOMITING: 0
DIARRHEA: 0
CHOKING: 0
RESPIRATORY NEGATIVE: 1
ABDOMINAL PAIN: 0
SHORTNESS OF BREATH: 0
COUGH: 0

## 2020-08-27 ASSESSMENT — PAIN DESCRIPTION - PROGRESSION
CLINICAL_PROGRESSION: NOT CHANGED

## 2020-08-27 ASSESSMENT — PAIN DESCRIPTION - FREQUENCY
FREQUENCY: CONTINUOUS

## 2020-08-27 ASSESSMENT — PAIN DESCRIPTION - ORIENTATION
ORIENTATION: LEFT

## 2020-08-27 ASSESSMENT — PAIN DESCRIPTION - PAIN TYPE
TYPE: SURGICAL PAIN
TYPE: CHRONIC PAIN
TYPE: SURGICAL PAIN;CHRONIC PAIN
TYPE: CHRONIC PAIN
TYPE: CHRONIC PAIN;SURGICAL PAIN
TYPE: CHRONIC PAIN

## 2020-08-27 ASSESSMENT — PAIN DESCRIPTION - LOCATION
LOCATION: GENERALIZED;FOOT
LOCATION: GENERALIZED
LOCATION: GENERALIZED;FOOT

## 2020-08-27 ASSESSMENT — PAIN - FUNCTIONAL ASSESSMENT
PAIN_FUNCTIONAL_ASSESSMENT: PREVENTS OR INTERFERES SOME ACTIVE ACTIVITIES AND ADLS

## 2020-08-27 ASSESSMENT — PAIN DESCRIPTION - ONSET
ONSET: ON-GOING

## 2020-08-27 NOTE — FLOWSHEET NOTE
Patient is awake and alert while sitting up in bed. Patient calls to writer while Christos Cee is rounding on unit. Patient is approachable and engages in conversation. Patient has family support but they are all in [place]. It is not clear how much support the patient has in the area. Patient appears to be coping adequately and requests a prayer. Writer provides listening presence and emotional support as patient shares about his infection. Writer also provides prayer for healing, peace, and rest. Patient expresses appreciation for the visit. Spiritual Care will follow as needed. 08/27/20 1615   Encounter Summary   Services provided to: Patient   Referral/Consult From: Patient;Rounding   Support System Family members   Continue Visiting   (8/27/20)   Complexity of Encounter Low   Length of Encounter 15 minutes   Spiritual Assessment Completed Yes   Routine   Type Follow up   Spiritual/Zoroastrian   Type Spiritual support   Assessment Approachable   Intervention Active listening;Explored feelings, thoughts, concerns;Explored coping resources;Nurtured hope;Prayer   Outcome Expressed gratitude   .

## 2020-08-27 NOTE — PROGRESS NOTES
Progress Note  Podiatric Medicine and Surgery     Subjective     CC: TMA stump infection, left foot    POD#3 s/p left TMA revision (DOS: 8/24/20)  Patient seen and evaluated at bedside  Pain is been well controlled  Afebrile, tachycardic      HPI :  Glendy Leone is a 60 y.o. male seen at [facility] for surgical wound dehiscence and worsening infection of TMA stump of the left foot. Patient underwent TMA with flap closure of left foot on 08/05/2020. Patient was then admitted to SNF however after discharge from hospital patient never followed up with our clinic. He has not received any wound care or evaluation of the TMA stump since his discharge from the hospital.  Patient signed himself out from the SN[facility] on Wednesday, 08/19/2020. Due to [facility] signout patient was not given any medications or necessary supplies. Patient states he has been walking on the left lower extremity with a posterior splint intact despite nonweightbearing instruction. Patient relates \" not feeling so well\" the past couple days along with an increase in pain and malodor coming from the left TMA stump which prompted him to present to the ED today. Upon infectious work-up in the ED patient was found to have a blood glucose of 484 mg/dL, WBC of 13.7, ESR of 74. X-rays of the left foot were also obtained which demonstrated cortical irregularities to the distal third metatarsal suspicious for early osteomyelitis, small pocket of soft tissue emphysema dorsal distal aspect of the foot consistent with gas-forming infection. ROS: Denies N/V/F/C/SOB/CP. Otherwise negative except at stated in the HPI.      Medications:  Scheduled Meds:   polyethylene glycol  17 g Oral BID    insulin lispro  0-18 Units Subcutaneous TID     insulin lispro  0-9 Units Subcutaneous Nightly    apixaban  5 mg Oral BID    aspirin  81 mg Oral Daily    atorvastatin  40 mg Oral Daily    dilTIAZem  60 mg Oral 4x Daily    famotidine  20 mg Oral BID    ipratropium-albuterol  3 mL Inhalation Q4H WA    metFORMIN  500 mg Oral BID WC    nortriptyline  50 mg Oral Nightly    sodium chloride flush  10 mL Intravenous 2 times per day    piperacillin-tazobactam  3.375 g Intravenous Q8H    insulin glargine  70 Units Subcutaneous Nightly       Continuous Infusions:   sodium chloride 75 mL/hr at 20 1809    dextrose         PRN Meds:aluminum & magnesium hydroxide-simethicone, morphine, oxyCODONE-acetaminophen **OR** oxyCODONE-acetaminophen, docusate sodium, sodium chloride flush, potassium chloride **OR** potassium alternative oral replacement **OR** potassium chloride, magnesium sulfate, acetaminophen **OR** acetaminophen, polyethylene glycol, promethazine **OR** ondansetron, nicotine, glucose, dextrose, glucagon (rDNA), dextrose    Objective     Vitals:  Patient Vitals for the past 8 hrs:   BP Temp Temp src Pulse Resp SpO2 Weight   20 0720 -- -- -- -- -- 98 % --   20 0630 (!) 168/81 97.5 °F (36.4 °C) Oral 101 18 97 % --   20 0457 -- -- -- -- -- -- 166 lb (75.3 kg)     Average, Min, and Max for last 24 hours Vitals:  TEMPERATURE:  Temp  Av.8 °F (36.6 °C)  Min: 97.5 °F (36.4 °C)  Max: 98.1 °F (36.7 °C)    RESPIRATIONS RANGE: Resp  Av.5  Min: 16  Max: 18    PULSE RANGE: Pulse  Av.7  Min: 93  Max: 108    BLOOD PRESSURE RANGE:  Systolic (89RPX), OE , Min:144 , ENL:221   ; Diastolic (24VZX), BEU:09, Min:67, Max:81      PULSE OXIMETRY RANGE: SpO2  Av.8 %  Min: 94 %  Max: 98 %    I/O last 3 completed shifts:  In: -   Out: 3725 [[id number]]    CBC:  Recent Labs     20  0549   WBC 10.0   HGB 9.7*   HCT 32.0*      CRP 54.8*        BMP:  Recent Labs     20  0549      K 4.3      CO2 29   BUN 18   CREATININE 0.93   GLUCOSE 90   CALCIUM 8.7        Coags:  No results for input(s): APTT, PROT, INR in the last 72 hours.     Lab Results   Component Value Date    SEDRATE 74 (H) 2020     Recent Labs 08/26/20  0549   CRP 54.8*       Physical Exam:  Right-sided BKA     Vascular: DP and PT pulses are palpable. CFT brisk to flap edges. Hair growth is absent to the level of the digits. Mild nonpitting edema distal stump.       Neuro: Saph/sural/SP/DP/plantar sensation absent to light touch.     Musculoskeletal: Muscle strength is adequate ROM, adequate strength to all lower extremity muscle groups. Compartments are soft compressible. gross deformity is present with right-sided BKA and left TMA stump. No pain with compression of posterior calf.     Dermatologic: Incision of the TMA site well coapted with an opening centrally. Wound probes deep plantar medially approximately 3.5 cm. Scant sanguinous drainage expressed, no purulence. No periwound erythema, slight increase in warmth. No malodor. No fluctuance or obvious drainable fluid collection. Clinical Images:            Imaging:   XR FOOT LEFT (MIN 3 VIEWS)   Final Result   Interval decreased air within the caudal soft tissues overlying the left foot   mid metatarsal amputation site suggesting interval debridement/drainage. XR FOOT LEFT (2 VIEWS)   Final Result   Interval increased distal soft tissue swelling and soft tissue gas concerning   for presence of cellulitis with gas-forming organism. XR FOOT LEFT (MIN 3 VIEWS)   Final Result   Skin defect with soft tissue swelling and lucencies of the amputation stump   likely representing cellulitis with gas-forming organisms. Underlying 3rd   metatarsal shows subtle distal marginal irregularity on the oblique view. This could be accentuated by the soft tissue lucencies nonetheless concerning   for osteomyelitis.              Cultures:   Cx 8/22: nonlactose fermenting GNR, proteus, GPC, GNR  Cx 8/24 pre irrigation (bone): proteus, staph coag neg GNR, GPC  Cx 8/24 post irrigation (bone): proteus  Cx 8/24 swabs post irrigation: proteus    Assessment   Amina Palacios is a 60 y.o. male with 1. TMA revision, left foot (DOS: 8/24/20)  2. Surgical wound dehiscence, left foot  3. Cellulitis, left foot  4. Possible osteomyelitis, left foot  5. Diabetes mellitus type 2 with associated peripheral neuropathy  6. Noncompliance    Principal Problem:    Diabetic foot infection ([facility]  Active Problems:    Type 2 diabetes mellitus with diabetic polyneuropathy, with long-term current use of insulin ([facility])    Diabetic polyneuropathy ([facility])    Tobacco dependence    Edentulous    COPD without exacerbation ([facility])    Dyslipidemia    PVD (peripheral vascular disease) ([facility])    Below-knee amputation of right lower extremity ([facility]    Essential hypertension    Uncontrolled type 2 diabetes mellitus with hyperglycemia ([facility]    Noncompliance  Resolved Problems:    * No resolved hospital problems. *       Plan     · Patient examined and evaluated at bedside   · Treatment options discussed in detail with the patient  · Post-op x-rays reviewed- Decreased soft tissue emphysema appreciated consistent with operative site  · Medicial management per primary  · Anbx: Zosyn, ID following. Appreciate recs  · Patient is POD#3 of left foot TMA revision. Central opening is granulating in. · Patient is stable from podiatry standpoint pending placement and ID recommendations. No further surgical intervention necessary at this time. We will continue local wound care to left foot with 1/4 inch packing, Adaptic around surgical incision site, 4 x 4's, Kerlix, Ace. To be changed daily by nursing. Strict nonweightbearing to the left foot with walker. We will follow-up within 1 week of discharge with Dr. Simeon Bender at [facility] pod[facility]ry [facility]. · Dressing applied to Left lower extremity: 1/4 inch iodoform, Adaptic, DSD, Ace.    · Strict nonweightbearing to the left lower extremity   · Discussed with Dr. Helena Tomas, [place]   Podiatric Medicine & Surgery   8/27/2020 at 11:21 AM

## 2020-08-27 NOTE — PLAN OF CARE
Checked for incontinence every 2 hours and prn. Pericare as needed. Assisted to reposition off back frequently. On waffle mattress. Heels off bed with pillows. Siderails up x 2  Hourly rounding  Call light in reach  Instructed to call for assist before attempting out of bed.   Remains free from falls and accidental injury at this time   Floor free from obstacles  Bed is locked and in lowest position  Adequate lighting provided  Bed alarm on, [place] star and Stay with Me signs posted

## 2020-08-27 NOTE — PROGRESS NOTES
antimicrobial therapy with Augmentin and doxycycline for a week. The patient states that he never had any wound care done in the time since his discharge and he started to feel \"funny\" though he cannot really specify what this means exactly and ended up going back into the emergency room. The patient had dressing change done which showed devitalized tissue as well as an x-ray that showed some gaseous soft tissue changes and the patient was seen by the podiatry service had a debridement done at bedside. I was asked to evaluate if there was concern for osteomyelitis as the open wounds did have 3 metatarsal bones exposed. The patient was taken to the operating room 2020  The patient had all necrotic and fibrotic tissue debrided until bleeding tissue was noted with no purulent drainage and hard cortical bone. Current evaluation:2020    /67   Pulse 108   Temp 98 °F (36.7 °C) (Oral)   Resp 18   Ht 6' (1.829 m)   Wt 166 lb (75.3 kg)   SpO2 97%   BMI 22.51 kg/m²     Temperature Range: Temp: 98 °F (36.7 °C) Temp  Av.8 °F (36.6 °C)  Min: 97.5 °F (36.4 °C)  Max: 98.1 °F (36.7 °C)  The patient is seen and evaluated at bedside he is awake and alert in no acute distress. He reports that he feels better than yesterday. He does not report any significant nausea vomiting though he does still have some epigastric discomfort. He does not report any pain    Review of Systems   Constitutional: Negative. Respiratory: Negative. Cardiovascular: Negative. Gastrointestinal: Positive for abdominal pain. Genitourinary: Negative. Musculoskeletal: Negative. Allergic/Immunologic: Negative. Neurological: Negative. Physical Examination :     Physical Exam  Constitutional:       Appearance: He is well-developed. HENT:      Head: Normocephalic and atraumatic. Neck:      Musculoskeletal: Normal range of motion and neck supple. Cardiovascular:      Rate and Rhythm: Normal rate. Heart sounds: Normal heart sounds. No friction rub. No gallop. Pulmonary:      Effort: Pulmonary effort is normal.      Breath sounds: Normal breath sounds. No wheezing. Abdominal:      General: Bowel sounds are normal.      Palpations: Abdomen is soft. There is no mass. Tenderness: There is no abdominal tenderness. Musculoskeletal:      Comments: Right below the knee amputation   Lymphadenopathy:      Cervical: No cervical adenopathy. Skin:     General: Skin is warm and dry. Comments: There is a dressing in the left foot   Neurological:      Mental Status: He is alert and oriented to person, place, and time. Laboratory data:   I have independently reviewed the followinglabs:  CBC with Differential:   Recent Labs     08/26/20  0549   WBC 10.0   HGB 9.7*   HCT 32.0*      LYMPHOPCT 21*   MONOPCT 11     BMP:   Recent Labs     08/26/20  0549      K 4.3      CO2 29   BUN 18   CREATININE 0.93     Hepatic Function Panel: No results for input(s): PROT, LABALBU, BILIDIR, IBILI, BILITOT, ALKPHOS, ALT, AST in the last 72 hours. No results found for: PROCAL  Lab Results   Component Value Date    CRP 54.8 08/26/2020    CRP 31.4 08/24/2020    CRP 27.4 08/22/2020     Lab Results   Component Value Date    SEDRATE 74 (H) 08/22/2020       Lab Results   Component Value Date    DDIMER 0.39 06/29/2020    DDIMER 1.63 12/20/2017    DDIMER 0.68 12/25/2011    FERRITIN 64 01/25/2014       No results for input(s): VANCOTROUGH in the last 72 hours. Imaging Studies:   No new imaging    Cultures:     Culture, Blood 1 [[id number]]   Collected: 08/22/20 1711    Order Status: Completed  Specimen: Blood  Updated: 08/27/20 0014     Specimen Description  . BLOOD     Special Requests  R AC 6 ML     Culture  NO GROWTH 5 DAYS    Culture, Blood 1 [[id number]]   Collected: 08/22/20 1711    Order Status: Completed  Specimen: Blood  Updated: 08/27/20 0014     Specimen Description  . BLOOD     Special SUSCEPTIBLE    trimethoprim-sulfamethoxazole  Sensitive   Preliminary      <=20   SUSCEPTIBLE    piperacillin-tazobactam  Sensitive   Preliminary      <=4   SUSCEPTIBLE      Culture, Anaerobic and Aerobic [[id number]]  (Abnormal)  Collected: 08/24/20 1923    Order Status: Completed  Specimen: Swab from Foot  Updated: 08/26/20 1923     Specimen Description  . FOOT     Special Requests  NOT REPORTED     Direct Exam  RARE NEUTROPHILSAbnormal        NO BACTERIA SEEN     Culture  PROTEUS MIRABILIS SCANT GROWTH For susceptibility, refer to previous culture. Abnormal        NO ANAEROBIC ORGANISMS ISOLATED AT 2 DAYSAbnormal       Culture, Anaerobic and Aerobic [[id number]]  (Abnormal)  Collected: 08/22/20 1830    Order Status: Completed  Specimen: Foot  Updated: 08/26/20 1539     Specimen Description  . FOOT     Special Requests  NOT REPORTED     Direct Exam  RARE NEUTROPHILSAbnormal        MANY GRAM NEGATIVE RODSAbnormal        FEW GRAM POSITIVE COCCI IN PAIRSAbnormal       Culture  NON LACTOSE FERMENTING GRAM NEGATIVE RODS MODERATE GROWTHAbnormal        PROTEUS SPECIES MODERATE GROWTHAbnormal        NON LACTOSE FERMENTING GRAM NEGATIVE RODS DIFFERENT MORPHOLOGY MODERATE GROWTHAbnormal        PRESUMPTIVE ID: GROUP D ENTEROCOCCUS MODERATE GROWTH      This is a mixed culture of organisms, which may represent colonization or contamination.  Notify the laboratory within 7 days for further workup.  Susceptibilities have not been performed. Abnormal        NO ANAEROBIC ORGANISMS ISOLATED AT 4 DAYSAbnormal         Medications:      polyethylene glycol  17 g Oral BID    insulin lispro  0-18 Units Subcutaneous TID WC    insulin lispro  0-9 Units Subcutaneous Nightly    apixaban  5 mg Oral BID    aspirin  81 mg Oral Daily    atorvastatin  40 mg Oral Daily    dilTIAZem  60 mg Oral 4x Daily    famotidine  20 mg Oral BID    ipratropium-albuterol  3 mL Inhalation Q4H WA    metFORMIN  500 mg Oral BID WC    nortriptyline  50 mg Oral Nightly    sodium chloride flush  10 mL Intravenous 2 times per day    piperacillin-tazobactam  3.375 g Intravenous Q8H    insulin glargine  70 Units Subcutaneous Nightly           [facility]  Sergey Townsend MD  [employer]  OFFICE: [phone]      Electronically signed by Sergey Townsend MD on 8/27/2020 at 1:38 PM  Thank you for allowing us to participate in the care of this patient. Please call with questions. This note iscreated with the assistance of a speech recognition program.  While intending to generate a document that actually reflects the content of the visit, the document can still have some errors including those of syntax andsound a like substitutions which may escape proof reading. In such instances, actual meaning can be extrapolated by contextual diversion.
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
## 2020-08-28 LAB
BUN BLDV-MCNC: 16 MG/DL (ref 8–23)
C-REACTIVE PROTEIN: 51.5 MG/L (ref 0–5)
CREAT SERPL-MCNC: 0.88 MG/DL (ref 0.7–1.2)
CULTURE: NORMAL
CULTURE: NORMAL
GFR AFRICAN AMERICAN: >60 ML/MIN
GFR NON-AFRICAN AMERICAN: >60 ML/MIN
GFR SERPL CREATININE-BSD FRML MDRD: NORMAL ML/MIN/{1.73_M2}
GFR SERPL CREATININE-BSD FRML MDRD: NORMAL ML/MIN/{1.73_M2}
GLUCOSE BLD-MCNC: 113 MG/DL (ref 75–110)
GLUCOSE BLD-MCNC: 204 MG/DL (ref 75–110)
GLUCOSE BLD-MCNC: 215 MG/DL (ref 75–110)
Lab: NORMAL
Lab: NORMAL
SPECIMEN DESCRIPTION: NORMAL
SPECIMEN DESCRIPTION: NORMAL
SURGICAL PATHOLOGY REPORT: NORMAL

## 2020-08-28 PROCEDURE — 6370000000 HC RX 637 (ALT 250 FOR IP): Performed by: STUDENT IN AN ORGANIZED HEALTH CARE EDUCATION/TRAINING PROGRAM

## 2020-08-28 PROCEDURE — 94640 AIRWAY INHALATION TREATMENT: CPT

## 2020-08-28 PROCEDURE — 99232 SBSQ HOSP IP/OBS MODERATE 35: CPT | Performed by: INTERNAL MEDICINE

## 2020-08-28 PROCEDURE — 97166 OT EVAL MOD COMPLEX 45 MIN: CPT

## 2020-08-28 PROCEDURE — 99232 SBSQ HOSP IP/OBS MODERATE 35: CPT | Performed by: FAMILY MEDICINE

## 2020-08-28 PROCEDURE — 6370000000 HC RX 637 (ALT 250 FOR IP): Performed by: NURSE PRACTITIONER

## 2020-08-28 PROCEDURE — 6370000000 HC RX 637 (ALT 250 FOR IP): Performed by: FAMILY MEDICINE

## 2020-08-28 PROCEDURE — 82565 ASSAY OF CREATININE: CPT

## 2020-08-28 PROCEDURE — 86140 C-REACTIVE PROTEIN: CPT

## 2020-08-28 PROCEDURE — 36415 COLL VENOUS BLD VENIPUNCTURE: CPT

## 2020-08-28 PROCEDURE — 6370000000 HC RX 637 (ALT 250 FOR IP): Performed by: INTERNAL MEDICINE

## 2020-08-28 PROCEDURE — 97535 SELF CARE MNGMENT TRAINING: CPT

## 2020-08-28 PROCEDURE — 2580000003 HC RX 258: Performed by: STUDENT IN AN ORGANIZED HEALTH CARE EDUCATION/TRAINING PROGRAM

## 2020-08-28 PROCEDURE — 1200000000 HC SEMI PRIVATE

## 2020-08-28 PROCEDURE — 82947 ASSAY GLUCOSE BLOOD QUANT: CPT

## 2020-08-28 PROCEDURE — 6360000002 HC RX W HCPCS: Performed by: FAMILY MEDICINE

## 2020-08-28 PROCEDURE — 84520 ASSAY OF UREA NITROGEN: CPT

## 2020-08-28 RX ADMIN — METFORMIN HYDROCHLORIDE 500 MG: 500 TABLET ORAL at 09:26

## 2020-08-28 RX ADMIN — OXYCODONE HYDROCHLORIDE AND ACETAMINOPHEN 2 TABLET: 5; 325 TABLET ORAL at 01:14

## 2020-08-28 RX ADMIN — OXYCODONE HYDROCHLORIDE AND ACETAMINOPHEN 2 TABLET: 5; 325 TABLET ORAL at 14:28

## 2020-08-28 RX ADMIN — IPRATROPIUM BROMIDE AND ALBUTEROL SULFATE 3 ML: .5; 3 SOLUTION RESPIRATORY (INHALATION) at 07:43

## 2020-08-28 RX ADMIN — CIPROFLOXACIN 500 MG: 500 TABLET ORAL at 09:26

## 2020-08-28 RX ADMIN — ALUMINUM HYDROXIDE, MAGNESIUM HYDROXIDE, AND SIMETHICONE 30 ML: 200; 200; 20 SUSPENSION ORAL at 09:25

## 2020-08-28 RX ADMIN — CIPROFLOXACIN 500 MG: 500 TABLET ORAL at 20:38

## 2020-08-28 RX ADMIN — DILTIAZEM HYDROCHLORIDE 60 MG: 60 TABLET, FILM COATED ORAL at 14:28

## 2020-08-28 RX ADMIN — FAMOTIDINE 20 MG: 20 TABLET ORAL at 09:26

## 2020-08-28 RX ADMIN — ATORVASTATIN CALCIUM 40 MG: 40 TABLET, FILM COATED ORAL at 09:26

## 2020-08-28 RX ADMIN — INSULIN LISPRO 3 UNITS: 100 INJECTION, SOLUTION INTRAVENOUS; SUBCUTANEOUS at 20:37

## 2020-08-28 RX ADMIN — POLYETHYLENE GLYCOL (3350) 17 G: 17 POWDER, FOR SOLUTION ORAL at 09:25

## 2020-08-28 RX ADMIN — DILTIAZEM HYDROCHLORIDE 60 MG: 60 TABLET, FILM COATED ORAL at 17:40

## 2020-08-28 RX ADMIN — LINEZOLID 600 MG: 600 TABLET, FILM COATED ORAL at 20:46

## 2020-08-28 RX ADMIN — OXYCODONE HYDROCHLORIDE AND ACETAMINOPHEN 2 TABLET: 5; 325 TABLET ORAL at 09:25

## 2020-08-28 RX ADMIN — FAMOTIDINE 20 MG: 20 TABLET ORAL at 20:38

## 2020-08-28 RX ADMIN — OXYCODONE HYDROCHLORIDE AND ACETAMINOPHEN 2 TABLET: 5; 325 TABLET ORAL at 23:50

## 2020-08-28 RX ADMIN — DILTIAZEM HYDROCHLORIDE 60 MG: 60 TABLET, FILM COATED ORAL at 23:50

## 2020-08-28 RX ADMIN — INSULIN GLARGINE 70 UNITS: 100 INJECTION, SOLUTION SUBCUTANEOUS at 20:37

## 2020-08-28 RX ADMIN — POLYETHYLENE GLYCOL (3350) 17 G: 17 POWDER, FOR SOLUTION ORAL at 20:38

## 2020-08-28 RX ADMIN — DILTIAZEM HYDROCHLORIDE 60 MG: 60 TABLET, FILM COATED ORAL at 09:26

## 2020-08-28 RX ADMIN — APIXABAN 5 MG: 5 TABLET, FILM COATED ORAL at 09:26

## 2020-08-28 RX ADMIN — SODIUM CHLORIDE: 9 INJECTION, SOLUTION INTRAVENOUS at 17:39

## 2020-08-28 RX ADMIN — APIXABAN 5 MG: 5 TABLET, FILM COATED ORAL at 20:38

## 2020-08-28 RX ADMIN — LINEZOLID 600 MG: 600 TABLET, FILM COATED ORAL at 09:26

## 2020-08-28 RX ADMIN — OXYCODONE HYDROCHLORIDE AND ACETAMINOPHEN 2 TABLET: 5; 325 TABLET ORAL at 05:27

## 2020-08-28 RX ADMIN — ALUMINUM HYDROXIDE, MAGNESIUM HYDROXIDE, AND SIMETHICONE 30 ML: 200; 200; 20 SUSPENSION ORAL at 02:56

## 2020-08-28 RX ADMIN — Medication 2 MG: at 20:37

## 2020-08-28 RX ADMIN — SODIUM CHLORIDE, PRESERVATIVE FREE 10 ML: 5 INJECTION INTRAVENOUS at 20:41

## 2020-08-28 RX ADMIN — INSULIN LISPRO 3 UNITS: 100 INJECTION, SOLUTION INTRAVENOUS; SUBCUTANEOUS at 17:39

## 2020-08-28 RX ADMIN — IPRATROPIUM BROMIDE AND ALBUTEROL SULFATE 3 ML: .5; 3 SOLUTION RESPIRATORY (INHALATION) at 17:54

## 2020-08-28 RX ADMIN — NORTRIPTYLINE HYDROCHLORIDE 50 MG: 25 CAPSULE ORAL at 20:46

## 2020-08-28 RX ADMIN — METFORMIN HYDROCHLORIDE 500 MG: 500 TABLET ORAL at 17:40

## 2020-08-28 RX ADMIN — DOCUSATE SODIUM 100 MG: 100 CAPSULE, LIQUID FILLED ORAL at 09:26

## 2020-08-28 RX ADMIN — OXYCODONE HYDROCHLORIDE AND ACETAMINOPHEN 2 TABLET: 5; 325 TABLET ORAL at 18:58

## 2020-08-28 RX ADMIN — ASPIRIN 81 MG 81 MG: 81 TABLET ORAL at 09:26

## 2020-08-28 ASSESSMENT — PAIN DESCRIPTION - DESCRIPTORS
DESCRIPTORS: ACHING;DISCOMFORT;SHARP
DESCRIPTORS: ACHING

## 2020-08-28 ASSESSMENT — PAIN DESCRIPTION - PAIN TYPE
TYPE: CHRONIC PAIN;SURGICAL PAIN
TYPE: ACUTE PAIN
TYPE: CHRONIC PAIN
TYPE: ACUTE PAIN
TYPE: ACUTE PAIN;SURGICAL PAIN
TYPE: ACUTE PAIN

## 2020-08-28 ASSESSMENT — PAIN SCALES - GENERAL
PAINLEVEL_OUTOF10: 3
PAINLEVEL_OUTOF10: 7
PAINLEVEL_OUTOF10: 6
PAINLEVEL_OUTOF10: 8
PAINLEVEL_OUTOF10: 8
PAINLEVEL_OUTOF10: 7
PAINLEVEL_OUTOF10: 6
PAINLEVEL_OUTOF10: 8
PAINLEVEL_OUTOF10: 3
PAINLEVEL_OUTOF10: 10
PAINLEVEL_OUTOF10: 9

## 2020-08-28 ASSESSMENT — ENCOUNTER SYMPTOMS
CHOKING: 0
ALLERGIC/IMMUNOLOGIC NEGATIVE: 1
RESPIRATORY NEGATIVE: 1
VOMITING: 0
NAUSEA: 0
COUGH: 0
CONSTIPATION: 0
VOICE CHANGE: 0
SHORTNESS OF BREATH: 0
CHEST TIGHTNESS: 0
BACK PAIN: 0
RHINORRHEA: 0
ABDOMINAL PAIN: 0
WHEEZING: 0
SINUS PRESSURE: 0
DIARRHEA: 0

## 2020-08-28 ASSESSMENT — PAIN DESCRIPTION - LOCATION
LOCATION: GENERALIZED
LOCATION: FOOT
LOCATION: GENERALIZED;FOOT

## 2020-08-28 ASSESSMENT — PAIN DESCRIPTION - FREQUENCY
FREQUENCY: INTERMITTENT

## 2020-08-28 ASSESSMENT — PAIN DESCRIPTION - ORIENTATION
ORIENTATION: LEFT

## 2020-08-28 ASSESSMENT — PAIN DESCRIPTION - ONSET: ONSET: GRADUAL

## 2020-08-28 ASSESSMENT — PAIN - FUNCTIONAL ASSESSMENT: PAIN_FUNCTIONAL_ASSESSMENT: ACTIVITIES ARE NOT PREVENTED

## 2020-08-28 ASSESSMENT — PAIN DESCRIPTION - PROGRESSION: CLINICAL_PROGRESSION: GRADUALLY IMPROVING

## 2020-08-28 NOTE — PROGRESS NOTES
Physical Therapy  DATE: 2020    NAME: Gus Chowdary  MRN: [id number]   : 1955    Patient not seen this date for Physical Therapy due to:  [] Blood transfusion in progress  [] Cancel by RN  [] Hemodialysis  [x]  Refusal by Patient  - Pt states he is sick and dizzy today, and to check back tomorrow. Will continue to check in as appropriate.   [] Spine Precautions   [] Strict Bedrest  [] Surgery  [] Testing      [] Other        [] PT being discontinued at this time. Patient independent. No further needs. [] PT being discontinued at this time as the patient has been transferred to hospice care. No further needs.     Oleksandr Owens, PT
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
## 2020-08-28 NOTE — PLAN OF CARE
Problem: Falls - Risk of:  Goal: Will remain free from falls  Description: Will remain free from falls  8/28/2020 1015 by Wilbert Ackerman RN  Outcome: Ongoing  Note: Patient is a fall risk during this admission. Fall risk assessment was performed. Patient is absent of falls. Bed is in the lowest position. Wheels on the bed are locked. Call light and bed side table are within reach. Clutter is removed. Patient was educated to call out when needing assistance or wanting to get out of bed. Patient offered toileting assistance during rounding. Hourly rounds have been performed. Fall precautions in  place, pt refuses bed alarms, has been asking for help appropriately  8/28/2020 0053 by Dayami Su RN  Outcome: Ongoing  Note: Refused bed alarm, call bell in reach, encouraged to use prn -- prosthesis for RLE at bedside, pt aware of NWB to left foot -- noncompliant per report     Problem: Skin Integrity:  Goal: Will show no infection signs and symptoms  Description: Will show no infection signs and symptoms  8/28/2020 1015 by Wilbert Ackerman RN  Outcome: Ongoing  Note: Pt being treated for infection, dressing changes per podiatry, receiving po antibiotics  8/28/2020 0053 by Dayami Su RN  Outcome: Ongoing  Note: Dressing to left foot d&i     Problem: Pain:  Goal: Pain level will decrease  Description: Pain level will decrease  8/28/2020 1015 by Wilbert Ackerman RN  Outcome: Ongoing  Note: Pain level assessment complete.    Patient educated on pain scale and control interventions  PRN pain medication given per patient request  Patient instructed to call out with new onset of pain or unrelieved pain   8/28/2020 0053 by Dayami Su RN  Outcome: Ongoing  Note: Pt indicated some relief from prn pain med given     Problem: Tobacco Use:  Goal: Inpatient tobacco use cessation counseling participation  Description: Inpatient tobacco use cessation counseling participation  8/28/2020 1015 by Wilbert Ackerman RN  Outcome: Ongoing  Note: Reviewed smoking cessation with the pt , not interested at this time , denies need for Nicotine patch  8/28/2020 0053 by Becky Stewart RN  Outcome: Ongoing  Note: Refuses nicotine patch at this time     Problem:  Activity:  Goal: Risk for activity intolerance will decrease  Description: Risk for activity intolerance will decrease  8/28/2020 1015 by Raisa Brito RN  Outcome: Ongoing  8/28/2020 0053 by Becky Stewart RN  Outcome: Ongoing     Problem: Coping:  Goal: Ability to adjust to condition or change in health will improve  Description: Ability to adjust to condition or change in health will improve  Outcome: Ongoing     Problem: Health Behavior:  Goal: Ability to identify and utilize available resources and services will improve  Description: Ability to identify and utilize available resources and services will improve  Outcome: Ongoing     Problem: Metabolic:  Goal: Ability to maintain appropriate glucose levels will improve  Description: Ability to maintain appropriate glucose levels will improve  8/28/2020 1015 by Raisa Brito RN  Outcome: Ongoing  Note: Checking blood sugars before meals and at bedtime , giving meds as ordered, giving insulin coverage as needed  8/28/2020 0053 by Becky Stewart RN  Outcome: Ongoing     Problem: Nutritional:  Goal: Maintenance of adequate nutrition will improve  Description: Maintenance of adequate nutrition will improve  Outcome: Ongoing     Problem: Physical Regulation:  Goal: Complications related to the disease process, condition or treatment will be avoided or minimized  Description: Complications related to the disease process, condition or treatment will be avoided or minimized  Outcome: Ongoing     Problem: Tissue Perfusion:  Goal: Adequacy of tissue perfusion will improve  Description: Adequacy of tissue perfusion will improve  8/28/2020 1015 by Raisa Briot RN  Outcome: Ongoing  8/28/2020 0053 by Becky Stewart RN  Outcome: Ongoing

## 2020-08-28 NOTE — CARE COORDINATION
Social Work- Met with patient to discuss dc options. Patient stated that he is undecided. Explained that he may be ready for dc and  will need to plan for either SNF or home care. He agreed to review list of SNF, in case he decides. The Plan for Transition of Care is related to the following treatment goals: SNF for wound care/ PT/OT    The Patient and/or patient representative patietn was provided with a choice of provider and agrees   with the discharge plan. [x] Yes [] No    Freedom of choice list was provided with basic dialogue that supports the patient's individualized plan of care/goals, treatment preferences and shares the quality data associated with the providers. [x] Yes [] No. Patient would like referral faxed to [facility] [place], [address].  Sent referral. Chuck Marks

## 2020-08-28 NOTE — PROGRESS NOTES
Progress Note  Podiatric Medicine and Surgery     Subjective     CC: TMA stump infection, left foot    POD#3 s/p left TMA revision (DOS: 8/24/20)  Patient seen and evaluated at bedside  Pain is been well controlled  Afebrile, tachycardic  CRP down trending 54.8>51.5      HPI :  Hernan King is a 60 y.o. male seen at [facility] for surgical wound dehiscence and worsening infection of TMA stump of the left foot. Patient underwent TMA with flap closure of left foot on 08/05/2020. Patient was then admitted to SNF however after discharge from hospital patient never followed up with our clinic. He has not received any wound care or evaluation of the TMA stump since his discharge from the hospital.  Patient signed himself out from the SNF [facility] on Wednesday, 08/19/2020. Due to [facility] signout patient was not given any medications or necessary supplies. Patient states he has been walking on the left lower extremity with a posterior splint intact despite nonweightbearing instruction. Patient relates \" not feeling so well\" the past couple days along with an increase in pain and malodor coming from the left TMA stump which prompted him to present to the ED today. Upon infectious work-up in the ED patient was found to have a blood glucose of 484 mg/dL, WBC of 13.7, ESR of 74. X-rays of the left foot were also obtained which demonstrated cortical irregularities to the distal third metatarsal suspicious for early osteomyelitis, small pocket of soft tissue emphysema dorsal distal aspect of the foot consistent with gas-forming infection. ROS: Denies N/V/F/C/SOB/CP. Otherwise negative except at stated in the HPI.      Medications:  Scheduled Meds:   ciprofloxacin  500 mg Oral 2 times per day    linezolid  600 mg Oral 2 times per day    polyethylene glycol  17 g Oral BID    insulin lispro  0-18 Units Subcutaneous TID WC    insulin lispro  0-9 Units Subcutaneous Nightly    apixaban  5 mg Oral BID    aspirin  81 mg Oral Daily    atorvastatin  40 mg Oral Daily    dilTIAZem  60 mg Oral 4x Daily    famotidine  20 mg Oral BID    ipratropium-albuterol  3 mL Inhalation Q4H WA    metFORMIN  500 mg Oral BID WC    nortriptyline  50 mg Oral Nightly    sodium chloride flush  10 mL Intravenous 2 times per day    insulin glargine  70 Units Subcutaneous Nightly       Continuous Infusions:   sodium chloride 75 mL/hr at 20 2308    dextrose         PRN Meds:aluminum & magnesium hydroxide-simethicone, morphine, oxyCODONE-acetaminophen **OR** oxyCODONE-acetaminophen, docusate sodium, sodium chloride flush, potassium chloride **OR** potassium alternative oral replacement **OR** potassium chloride, magnesium sulfate, acetaminophen **OR** acetaminophen, polyethylene glycol, promethazine **OR** ondansetron, nicotine, glucose, dextrose, glucagon (rDNA), dextrose    Objective     Vitals:  Patient Vitals for the past 8 hrs:   BP Temp Temp src Pulse Resp SpO2 Weight   20 0723 134/78 98.2 °F (36.8 °C) Oral 100 18 96 % --   20 0420 -- -- -- -- -- -- 163 lb (73.9 kg)     Average, Min, and Max for last 24 hours Vitals:  TEMPERATURE:  Temp  Av.1 °F (36.7 °C)  Min: 97.8 °F (36.6 °C)  Max: 98.2 °F (36.8 °C)    RESPIRATIONS RANGE: Resp  Av  Min: 18  Max: 18    PULSE RANGE: Pulse  Av.3  Min: 89  Max: 108    BLOOD PRESSURE RANGE:  Systolic (32WAJ), WM , Min:116 , VCW:369   ; Diastolic (10UNM), LMT:50, Min:54, Max:82      PULSE OXIMETRY RANGE: SpO2  Av %  Min: 94 %  Max: 98 %    I/O last 3 completed shifts: In: 896.6 [P.O.:300;  I.V.:596.6]  Out: 4325 [Urine:4325]    CBC:  Recent Labs     20  0549 20  0522   WBC 10.0  --    HGB 9.7*  --    HCT 32.0*  --      --    CRP 54.8* 51.5*        BMP:  Recent Labs     20  0549 20  0522     --    K 4.3  --      --    CO2 29  --    BUN 18 16   CREATININE 0.93 0.88   GLUCOSE 90  --    CALCIUM 8.7  --         Coags:  No results for input(s): APTT, PROT, INR in the last 72 hours. Lab Results   Component Value Date    SEDRATE 74 (H) 08/22/2020     Recent Labs     08/26/20  0549 08/28/20  0522   CRP 54.8* 51.5*       Physical Exam:  Right-sided BKA     Vascular: DP and PT pulses are palpable. CFT brisk to flap edges. Hair growth is absent to the level of the digits. Mild nonpitting edema distal stump.       Neuro: Saph/sural/SP/DP/plantar sensation absent to light touch.     Musculoskeletal: Muscle strength is adequate ROM, adequate strength to all lower extremity muscle groups. Compartments are soft compressible. gross deformity is present with right-sided BKA and left TMA stump. No pain with compression of posterior calf.     Dermatologic: Incision of the TMA site well coapted with an opening centrally. Wound probes deep plantar medially approximately 3.5 cm. Scant sanguinous drainage expressed, no purulence. No periwound erythema, slight increase in warmth. No malodor. No fluctuance or obvious drainable fluid collection. Clinical Images:                Imaging:   XR FOOT LEFT (MIN 3 VIEWS)   Final Result   Interval decreased air within the caudal soft tissues overlying the left foot   mid metatarsal amputation site suggesting interval debridement/drainage. XR FOOT LEFT (2 VIEWS)   Final Result   Interval increased distal soft tissue swelling and soft tissue gas concerning   for presence of cellulitis with gas-forming organism. XR FOOT LEFT (MIN 3 VIEWS)   Final Result   Skin defect with soft tissue swelling and lucencies of the amputation stump   likely representing cellulitis with gas-forming organisms. Underlying 3rd   metatarsal shows subtle distal marginal irregularity on the oblique view. This could be accentuated by the soft tissue lucencies nonetheless concerning   for osteomyelitis.              Cultures:   Cx 8/22: nonlactose fermenting GNR, proteus, GPC, GNR  Cx 8/24 pre irrigation (bone): VRE, proteus, staph coag neg GNR, GPC  Cx 8/24 post irrigation (bone): Group D enterococcus, proteus  Cx 8/24 swabs post irrigation: proteus    Assessment   Rossana Young is a 60 y.o. male with   1. TMA revision, left foot (DOS: 8/24/20)  2. Surgical wound dehiscence, left foot  3. Cellulitis, left foot  4. Possible osteomyelitis, left foot  5. Diabetes mellitus type 2 with associated peripheral neuropathy  6. Noncompliance    Principal Problem:    Diabetic foot infection ([address]  Active Problems:    Type 2 diabetes mellitus with diabetic polyneuropathy, with long-term current use of insulin (HCC)    Diabetic polyneuropathy ([facility])    Tobacco dependence    Edentulous    COPD without exacerbation ([facility])    Dyslipidemia    PVD (peripheral vascular disease) ([facility])    Below-knee amputation of right lower extremity ([facility]    Essential hypertension    Uncontrolled type 2 diabetes mellitus with hyperglycemia ([facility]    Noncompliance  Resolved Problems:    * No resolved hospital problems. *       Plan     · Patient examined and evaluated at bedside   · Treatment options discussed in detail with the patient  · Post-op x-rays reviewed- Decreased soft tissue emphysema appreciated consistent with operative site  · Medicial management per primary  · Anbx: Zosyn, ID following. Appreciate recs  · Patient is stable from podiatry standpoint pending placement and ID recommendations. No further surgical intervention necessary at this time. We will continue local wound care to left foot with 1/4 inch packing, Adaptic around surgical incision site, 4 x 4's, Kerlix, Ace. To be changed daily by nursing. Strict nonweightbearing to the left foot with walker. We will follow-up within 1 week of discharge with Dr. Severo Mower at [facility] podiatry clinic. · Dressing applied to Left lower extremity: 1/4 inch iodoform, Adaptic, DSD, Ace.    · Strict nonweightbearing to the left lower extremity   · Discussed with Dr. Carmela Tarango DPM   Podiatric Medicine & Surgery   8/28/2020 at 10:48 AM

## 2020-08-28 NOTE — PLAN OF CARE
Problem: Falls - Risk of:  Goal: Will remain free from falls  Description: Will remain free from falls  8/28/2020 0053 by Anastasia Rubi RN  Outcome: Ongoing  Note: Refused bed alarm, call bell in reach, encouraged to use prn -- prosthesis for RLE at bedside, pt aware of NWB to left foot -- noncompliant per report     Problem: Skin Integrity:  Goal: Will show no infection signs and symptoms  Description: Will show no infection signs and symptoms  8/28/2020 0053 by Anastasia Rubi RN  Outcome: Ongoing  Note: Dressing to left foot d&i     Problem: Pain:  Goal: Pain level will decrease  Description: Pain level will decrease  8/28/2020 0053 by Anastasia Rubi RN  Outcome: Ongoing  Note: Pt indicated some relief from prn pain med given     Problem: Tobacco Use:  Goal: Inpatient tobacco use cessation counseling participation  Description: Inpatient tobacco use cessation counseling participation  Outcome: Ongoing  Note: Refuses nicotine patch at this time     Problem:  Activity:  Goal: Risk for activity intolerance will decrease  Description: Risk for activity intolerance will decrease  Outcome: Ongoing     Problem: Metabolic:  Goal: Ability to maintain appropriate glucose levels will improve  Description: Ability to maintain appropriate glucose levels will improve  Outcome: Ongoing     Problem: Tissue Perfusion:  Goal: Adequacy of tissue perfusion will improve  Description: Adequacy of tissue perfusion will improve  Outcome: Ongoing

## 2020-08-28 NOTE — PROGRESS NOTES
[facility]  Progress Note    Eilleen Rinne  MRN: [id number]  Date: 8/28/2020  LOS: 6     Reason for F/U :   Left TMA stump infection    Impression :   1. Recent left transmetatarsal amputation 8/5/2020  2. Trans-metatarsal stump infection with wound dehiscence and possible underlying osteomyelitis of the metatarsals  · Status post bedside wound debridement 8/22/2020  · Status post incision and drainage of the left foot with revision of transmetatarsal amputation of the left foot 8/24/2020  · Cultures with VRE and Proteus mirabilis  3. Peripheral arterial disease status post revascularization  4. 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    Recommendations:   · The enterococcus is grown out to be a vancomycin resistant strain   · Continue oral ciprofloxacin and I switched the doxycycline to linezolid given the culture of VRE. · The plan will remain to complete a 28-day course through 9/25/20    Infection Control Recommendations:   Universal precautions    Discharge Planning:   Patient will need Midline Catheter Insertion/ PICC line Insertion: No  Patient willneed outpatient wound care: Yes    Medical Decision making / Summary of Stay:   Eilleen Rinne is a 60y.o.-year-old male who was initially admitted on 8/22/2020. Neel Calderon has a history of peripheral arterial disease and has undergone revascularization procedures on the left in the past, esophageal cancer, diabetes mellitus with associated neuropathy and he was recently hospitalized with left foot plantar ulcerations of the great toe with associated gangrene and cellulitis of the foot. The patient subsequently underwent transmetatarsal amputation of the left foot with Achilles tendon lengthening on 8/5/2020. The patient was discharged to a rehabilitation facility on 8/7/2020 on oral antimicrobial therapy with Augmentin and doxycycline for a week.   The patient states that he never had any wound care done in the time since his discharge and he started to feel \"funny\" though he cannot really specify what this means exactly and ended up going back into the emergency room. The patient had dressing change done which showed devitalized tissue as well as an x-ray that showed some gaseous soft tissue changes and the patient was seen by the podiatry service had a debridement done at bedside. I was asked to evaluate if there was concern for osteomyelitis as the open wounds did have 3 metatarsal bones exposed. The patient was taken to the operating room 2020  The patient had all necrotic and fibrotic tissue debrided until bleeding tissue was noted with no purulent drainage and hard cortical bone. Current evaluation:2020    BP (!) 140/64   Pulse 100   Temp 98.2 °F (36.8 °C) (Oral)   Resp 18   Ht 6' (1.829 m)   Wt 163 lb (73.9 kg)   SpO2 96%   BMI 22.11 kg/m²     Temperature Range: Temp: 98.2 °F (36.8 °C) Temp  Av.1 °F (36.7 °C)  Min: 97.8 °F (36.6 °C)  Max: 98.2 °F (36.8 °C)  The patient is seen and evaluated at bedside he is awake and alert in no acute distress. He reports that he has significant reflux symptoms yesterday and also that he woke up with some hallucinations as he felt that there was a \"hole in the wall\"    Review of Systems   Constitutional: Negative. Respiratory: Negative. Cardiovascular: Negative. Gastrointestinal:        Reports having reflux symptoms   Genitourinary: Negative. Musculoskeletal: Negative. Allergic/Immunologic: Negative. Neurological: Negative. Psychiatric/Behavioral: Positive for hallucinations. Physical Examination :     Physical Exam  Constitutional:       Appearance: He is well-developed. HENT:      Head: Normocephalic and atraumatic. Neck:      Musculoskeletal: Normal range of motion and neck supple. Cardiovascular:      Rate and Rhythm: Normal rate. Heart sounds: Normal heart sounds. No friction rub. No gallop.     Pulmonary:      Effort: Pulmonary effort is normal.      Breath sounds: Normal breath sounds. No wheezing. Abdominal:      General: Bowel sounds are normal.      Palpations: Abdomen is soft. There is no mass. Tenderness: There is no abdominal tenderness. Musculoskeletal:      Comments: Right below the knee amputation   Lymphadenopathy:      Cervical: No cervical adenopathy. Skin:     General: Skin is warm and dry. Comments: There is a dressing in the left foot   Neurological:      Mental Status: He is alert and oriented to person, place, and time. Laboratory data:   I have independently reviewed the followinglabs:  CBC with Differential:   Recent Labs     08/26/20  0549   WBC 10.0   HGB 9.7*   HCT 32.0*      LYMPHOPCT 21*   MONOPCT 11     BMP:   Recent Labs     08/26/20 0549 08/28/20  0522     --    K 4.3  --      --    CO2 29  --    BUN 18 16   CREATININE 0.93 0.88     Hepatic Function Panel: No results for input(s): PROT, LABALBU, BILIDIR, IBILI, BILITOT, ALKPHOS, ALT, AST in the last 72 hours. No results found for: PROCAL  Lab Results   Component Value Date    CRP 51.5 08/28/2020    CRP 54.8 08/26/2020    CRP 31.4 08/24/2020     Lab Results   Component Value Date    SEDRATE 74 (H) 08/22/2020       Lab Results   Component Value Date    DDIMER 0.39 06/29/2020    DDIMER 1.63 12/20/2017    DDIMER 0.68 12/25/2011    FERRITIN 64 01/25/2014       No results for input(s): VANCOTROUGH in the last 72 hours. Imaging Studies:   No new imaging    Cultures:     Culture, Anaerobic and Aerobic [[id number]]  (Abnormal)  Collected: 08/24/20 1923    Order Status: Completed  Specimen: Swab from Foot  Updated: 08/28/20 1603     Specimen Description  . FOOT     Special Requests  NOT REPORTED     Direct Exam  RARE NEUTROPHILSAbnormal        NO BACTERIA SEEN     Culture  PROTEUS MIRABILIS SCANT GROWTH For susceptibility, refer to previous culture. Abnormal        NO ANAEROBIC ORGANISMS ISOLATED AT 4 DAYSAbnormal Culture, Anaerobic and Aerobic [[id number]]  (Abnormal)   Collected: 08/24/20 3895    Order Status: Completed  Specimen: Bone from Foot  Updated: 08/28/20 1600     Specimen Description  . FOOT     Special Requests  NOT REPORTED     Direct Exam  RARE NEUTROPHILSAbnormal        RARE GRAM POSITIVE RODSAbnormal       Culture  PROTEUS MIRABILIS LIGHT GROWTHAbnormal        VANCOMYCIN RESISTANT ENTEROCOCCUS FAECALIS SCANT GROWTHAbnormal        STAPHYLOCOCCUS SPECIES, COAGULASE NEGATIVE SCANT GROWTH Susceptibilities have not been performed.  Notify the laboratory within 7 days for further workup if clinically indicated. Abnormal        NO ANAEROBIC ORGANISMS ISOLATED AT 4 DAYSAbnormal     Proteus mirabilis (1)     Antibiotic  Interpretation  DARIAN  Status     amikacin    Preliminary      NOT REPORTED    ampicillin  Sensitive   Preliminary      <=2   SUSCEPTIBLE    ampicillin-sulbactam    Preliminary      NOT REPORTED    aztreonam  Sensitive   Preliminary      <=1   SUSCEPTIBLE    ceFAZolin  Sensitive   Preliminary      <=4   SUSCEPTIBLE    cefepime    Preliminary      NOT REPORTED    cefTRIAXone  Sensitive   Preliminary      <=1   SUSCEPTIBLE    ciprofloxacin  Sensitive   Preliminary      <=0.25   SUSCEPTIBLE    ertapenem    Preliminary      NOT REPORTED    gentamicin  Sensitive   Preliminary      <=1   SUSCEPTIBLE    meropenem    Preliminary      NOT REPORTED    nitrofurantoin    Preliminary      NOT REPORTED    tigecycline    Preliminary      NOT REPORTED    tobramycin  Sensitive   Preliminary      <=1   SUSCEPTIBLE    trimethoprim-sulfamethoxazole  Sensitive   Preliminary      <=20   SUSCEPTIBLE    piperacillin-tazobactam  Sensitive   Preliminary      <=4   SUSCEPTIBLE    Vancomycin resistant enterococcus faecalis (2)     Antibiotic  Interpretation  DARIAN  Status     ampicillin  Sensitive   Preliminary      <=2   SUSCEPTIBLE    penicillin    Preliminary      NOT REPORTED    ciprofloxacin    Preliminary      NOT REPORTED erythromycin    Preliminary      NOT REPORTED    Gentamicin, High Level   NOT REPORTED  Preliminary     levofloxacin    Preliminary      NOT REPORTED    linezolid  Sensitive   Preliminary      1   SUSCEPTIBLE    nitrofurantoin    Preliminary      NOT REPORTED    Synercid    Preliminary      NOT REPORTED    Streptomycin, Hi Level   NOT REPORTED  Preliminary     tetracycline    Preliminary      NOT REPORTED    tigecycline    Preliminary      NOT REPORTED    vancomycin  Resistant   Preliminary      >=32   RESISTANT      Culture, Anaerobic and Aerobic [[id number]]  (Abnormal)  Collected: 08/24/20 1900    Order Status: Completed  Specimen: Bone from Foot  Updated: 08/28/20 1600     Specimen Description  . FOOT     Special Requests  NOT REPORTED     Direct Exam  NO NEUTROPHILS SEEN      NO BACTERIA SEEN     Culture  PROTEUS MIRABILIS LIGHT GROWTH For susceptibility, refer to previous culture. Abnormal        GROUP D ENTEROCOCCUS SCANT GROWTH For susceptibility, refer to previous culture. Abnormal        NO ANAEROBIC ORGANISMS ISOLATED AT 4 DAYSAbnormal       Culture, Blood 1 [[id number]]   Collected: 08/22/20 1711    Order Status: Completed  Specimen: Blood  Updated: 08/28/20 7142     Specimen Description  . BLOOD     Special Requests  L HAND 4 ML     Culture  NO GROWTH 6 DAYS    Culture, Blood 1 [[id number]]   Collected: 08/22/20 1711    Order Status: Completed  Specimen: Blood  Updated: 08/28/20 6652     Specimen Description  . BLOOD     Special Requests  R AC 6 ML     Culture  NO GROWTH 6 DAYS    Culture, Anaerobic and Aerobic [[id number]]  (Abnormal)  Collected: 08/22/20 1830    Order Status: Completed  Specimen: Foot  Updated: 08/27/20 1541     Specimen Description  . FOOT     Special Requests  NOT REPORTED     Direct Exam  RARE NEUTROPHILSAbnormal        MANY GRAM NEGATIVE RODSAbnormal        FEW GRAM POSITIVE COCCI IN PAIRSAbnormal       Culture  NON LACTOSE FERMENTING [address] NEGATIVE RODS MODERATE Elly Feather PROTEUS SPECIES MODERATE GROWTHAbnormal        NON LACTOSE FERMENTING GRAM NEGATIVE RODS DIFFERENT MORPHOLOGY MODERATE GROWTHAbnormal        PRESUMPTIVE ID: GROUP D ENTEROCOCCUS MODERATE GROWTH      This is a mixed culture of organisms, which may represent colonization or contamination.  Notify the laboratory within 7 days for further workup.  Susceptibilities have not been performed. Abnormal        NO ANAEROBIC ORGANISMS ISOLATED AT 5 DAYSAbnormal           Medications:      ciprofloxacin  500 mg Oral 2 times per day    linezolid  600 mg Oral 2 times per day    polyethylene glycol  17 g Oral BID    insulin lispro  0-18 Units Subcutaneous TID WC    insulin lispro  0-9 Units Subcutaneous Nightly    apixaban  5 mg Oral BID    aspirin  81 mg Oral Daily    atorvastatin  40 mg Oral Daily    dilTIAZem  60 mg Oral 4x Daily    famotidine  20 mg Oral BID    ipratropium-albuterol  3 mL Inhalation Q4H WA    metFORMIN  500 mg Oral BID WC    nortriptyline  50 mg Oral Nightly    sodium chloride flush  10 mL Intravenous 2 times per day    insulin glargine  70 Units Subcutaneous Nightly           [facility]  Josse Merchant MD  [employer]  OFFICE: [phone]      Electronically signed by Josse Merchant MD on 8/28/2020 at 4:53 PM  Thank you for allowing us to participate in the care of this patient. Please call with questions. This note iscreated with the assistance of a speech recognition program.  While intending to generate a document that actually reflects the content of the visit, the document can still have some errors including those of syntax andsound a like substitutions which may escape proof reading. In such instances, actual meaning can be extrapolated by contextual diversion.
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
## 2020-08-29 LAB
CULTURE: ABNORMAL
DIRECT EXAM: ABNORMAL
GLUCOSE BLD-MCNC: 80 MG/DL (ref 75–110)
GLUCOSE BLD-MCNC: 88 MG/DL (ref 75–110)
GLUCOSE BLD-MCNC: 99 MG/DL (ref 75–110)
Lab: ABNORMAL
Lab: ABNORMAL
SPECIMEN DESCRIPTION: ABNORMAL
SPECIMEN DESCRIPTION: ABNORMAL

## 2020-08-29 PROCEDURE — 6370000000 HC RX 637 (ALT 250 FOR IP): Performed by: STUDENT IN AN ORGANIZED HEALTH CARE EDUCATION/TRAINING PROGRAM

## 2020-08-29 PROCEDURE — 6370000000 HC RX 637 (ALT 250 FOR IP): Performed by: FAMILY MEDICINE

## 2020-08-29 PROCEDURE — 99231 SBSQ HOSP IP/OBS SF/LOW 25: CPT | Performed by: INTERNAL MEDICINE

## 2020-08-29 PROCEDURE — 6360000002 HC RX W HCPCS: Performed by: STUDENT IN AN ORGANIZED HEALTH CARE EDUCATION/TRAINING PROGRAM

## 2020-08-29 PROCEDURE — 6360000002 HC RX W HCPCS: Performed by: FAMILY MEDICINE

## 2020-08-29 PROCEDURE — 2580000003 HC RX 258: Performed by: STUDENT IN AN ORGANIZED HEALTH CARE EDUCATION/TRAINING PROGRAM

## 2020-08-29 PROCEDURE — 6370000000 HC RX 637 (ALT 250 FOR IP): Performed by: INTERNAL MEDICINE

## 2020-08-29 PROCEDURE — 1200000000 HC SEMI PRIVATE

## 2020-08-29 PROCEDURE — 94640 AIRWAY INHALATION TREATMENT: CPT

## 2020-08-29 PROCEDURE — 82947 ASSAY GLUCOSE BLOOD QUANT: CPT

## 2020-08-29 PROCEDURE — 6370000000 HC RX 637 (ALT 250 FOR IP): Performed by: NURSE PRACTITIONER

## 2020-08-29 PROCEDURE — 99232 SBSQ HOSP IP/OBS MODERATE 35: CPT | Performed by: FAMILY MEDICINE

## 2020-08-29 RX ADMIN — ATORVASTATIN CALCIUM 40 MG: 40 TABLET, FILM COATED ORAL at 10:02

## 2020-08-29 RX ADMIN — ONDANSETRON 4 MG: 2 INJECTION INTRAMUSCULAR; INTRAVENOUS at 15:33

## 2020-08-29 RX ADMIN — SODIUM CHLORIDE, PRESERVATIVE FREE 10 ML: 5 INJECTION INTRAVENOUS at 21:35

## 2020-08-29 RX ADMIN — METFORMIN HYDROCHLORIDE 500 MG: 500 TABLET ORAL at 17:14

## 2020-08-29 RX ADMIN — INSULIN GLARGINE 70 UNITS: 100 INJECTION, SOLUTION SUBCUTANEOUS at 21:34

## 2020-08-29 RX ADMIN — INSULIN LISPRO 5 UNITS: 100 INJECTION, SOLUTION INTRAVENOUS; SUBCUTANEOUS at 21:34

## 2020-08-29 RX ADMIN — Medication 2 MG: at 21:34

## 2020-08-29 RX ADMIN — FAMOTIDINE 20 MG: 20 TABLET ORAL at 21:33

## 2020-08-29 RX ADMIN — CIPROFLOXACIN 500 MG: 500 TABLET ORAL at 21:33

## 2020-08-29 RX ADMIN — OXYCODONE HYDROCHLORIDE AND ACETAMINOPHEN 2 TABLET: 5; 325 TABLET ORAL at 06:01

## 2020-08-29 RX ADMIN — DILTIAZEM HYDROCHLORIDE 60 MG: 60 TABLET, FILM COATED ORAL at 13:22

## 2020-08-29 RX ADMIN — ALUMINUM HYDROXIDE, MAGNESIUM HYDROXIDE, AND SIMETHICONE 30 ML: 200; 200; 20 SUSPENSION ORAL at 02:28

## 2020-08-29 RX ADMIN — LINEZOLID 600 MG: 600 TABLET, FILM COATED ORAL at 10:01

## 2020-08-29 RX ADMIN — IPRATROPIUM BROMIDE AND ALBUTEROL SULFATE 3 ML: .5; 3 SOLUTION RESPIRATORY (INHALATION) at 15:12

## 2020-08-29 RX ADMIN — DILTIAZEM HYDROCHLORIDE 60 MG: 60 TABLET, FILM COATED ORAL at 10:01

## 2020-08-29 RX ADMIN — APIXABAN 5 MG: 5 TABLET, FILM COATED ORAL at 21:33

## 2020-08-29 RX ADMIN — FAMOTIDINE 20 MG: 20 TABLET ORAL at 10:01

## 2020-08-29 RX ADMIN — CIPROFLOXACIN 500 MG: 500 TABLET ORAL at 10:01

## 2020-08-29 RX ADMIN — APIXABAN 5 MG: 5 TABLET, FILM COATED ORAL at 10:02

## 2020-08-29 RX ADMIN — LINEZOLID 600 MG: 600 TABLET, FILM COATED ORAL at 21:34

## 2020-08-29 RX ADMIN — SODIUM CHLORIDE: 9 INJECTION, SOLUTION INTRAVENOUS at 06:00

## 2020-08-29 RX ADMIN — IPRATROPIUM BROMIDE AND ALBUTEROL SULFATE 3 ML: .5; 3 SOLUTION RESPIRATORY (INHALATION) at 11:14

## 2020-08-29 RX ADMIN — POLYETHYLENE GLYCOL (3350) 17 G: 17 POWDER, FOR SOLUTION ORAL at 10:01

## 2020-08-29 RX ADMIN — DILTIAZEM HYDROCHLORIDE 60 MG: 60 TABLET, FILM COATED ORAL at 17:14

## 2020-08-29 RX ADMIN — SODIUM CHLORIDE, PRESERVATIVE FREE 10 ML: 5 INJECTION INTRAVENOUS at 10:02

## 2020-08-29 RX ADMIN — OXYCODONE HYDROCHLORIDE AND ACETAMINOPHEN 2 TABLET: 5; 325 TABLET ORAL at 17:14

## 2020-08-29 RX ADMIN — ASPIRIN 81 MG 81 MG: 81 TABLET ORAL at 10:02

## 2020-08-29 RX ADMIN — NORTRIPTYLINE HYDROCHLORIDE 50 MG: 25 CAPSULE ORAL at 21:34

## 2020-08-29 ASSESSMENT — PAIN DESCRIPTION - ORIENTATION
ORIENTATION: UPPER
ORIENTATION: LEFT
ORIENTATION: UPPER
ORIENTATION: UPPER

## 2020-08-29 ASSESSMENT — PAIN SCALES - GENERAL
PAINLEVEL_OUTOF10: 9
PAINLEVEL_OUTOF10: 8
PAINLEVEL_OUTOF10: 6
PAINLEVEL_OUTOF10: 7
PAINLEVEL_OUTOF10: 10
PAINLEVEL_OUTOF10: 7

## 2020-08-29 ASSESSMENT — PAIN DESCRIPTION - FREQUENCY
FREQUENCY: INTERMITTENT
FREQUENCY: INTERMITTENT

## 2020-08-29 ASSESSMENT — PAIN DESCRIPTION - PAIN TYPE
TYPE: CHRONIC PAIN
TYPE: CHRONIC PAIN
TYPE: SURGICAL PAIN
TYPE: CHRONIC PAIN

## 2020-08-29 ASSESSMENT — PAIN DESCRIPTION - DESCRIPTORS
DESCRIPTORS: ACHING
DESCRIPTORS: ACHING;DISCOMFORT;SORE
DESCRIPTORS: ACHING
DESCRIPTORS: ACHING

## 2020-08-29 ASSESSMENT — ENCOUNTER SYMPTOMS
CHOKING: 0
WHEEZING: 0
VOICE CHANGE: 0
DIARRHEA: 0
CHEST TIGHTNESS: 0
VOMITING: 0
RHINORRHEA: 0
SINUS PRESSURE: 0
ALLERGIC/IMMUNOLOGIC NEGATIVE: 1
GASTROINTESTINAL NEGATIVE: 1
RESPIRATORY NEGATIVE: 1
NAUSEA: 0
CONSTIPATION: 0
SHORTNESS OF BREATH: 0
COUGH: 0
ABDOMINAL PAIN: 0
BACK PAIN: 0

## 2020-08-29 ASSESSMENT — PAIN - FUNCTIONAL ASSESSMENT: PAIN_FUNCTIONAL_ASSESSMENT: PREVENTS OR INTERFERES SOME ACTIVE ACTIVITIES AND ADLS

## 2020-08-29 ASSESSMENT — PAIN DESCRIPTION - PROGRESSION
CLINICAL_PROGRESSION: NOT CHANGED

## 2020-08-29 ASSESSMENT — PAIN DESCRIPTION - LOCATION
LOCATION: BACK
LOCATION: FOOT
LOCATION: BACK
LOCATION: BACK

## 2020-08-29 ASSESSMENT — PAIN DESCRIPTION - ONSET: ONSET: GRADUAL

## 2020-08-29 NOTE — PROGRESS NOTES
Comprehensive Nutrition Assessment    Type and Reason for Visit:  Positive Nutrition Screen(Length of stay)    Nutrition Recommendations/Plan: Continue Carbohydrate Control diet. Start Glucerna 2x/day. Nutrition Assessment:  Patient admission is related to diabetic foot infection and transmetatarsal stump with dehiscence. Patient reports he has a good appetite and is eating well at meals. Patient is status post I&D of left foot with revision of transmetatarsal amputation (8/24/20). Continue Carbohydrate Control diet and start Glucerna twice a day. Monitor p.o intakes, labs and wound healing status. Malnutrition Assessment:  Malnutrition Status:  No malnutrition        Estimated Daily Nutrient Needs:  Energy (kcal):  1527-2160 kcal based on 28-30 kcal/kg; Weight Used for Energy Requirements:  Current     Protein (g):   gm based on 1.3-1.5 gm/kg; Weight Used for Protein Requirements:  Current            Nutrition Related Findings:  Right BKA. Left transmetatarsal amputation 8/5. Status post wound debridment 8/22. Status post Incision and drainage of left foot with revision of transmetatarsal amputation 8/24      Wounds:  Surgical Wound(left foot)       Current Nutrition Therapies:    DIET CARB CONTROL; Dietary Nutrition Supplements: Diabetic Oral Supplement    Anthropometric Measures:  · Height: 6' (182.9 cm)  · Current Body Weight: 163 lb (73.9 kg)   · Admission Body Weight: 163 lb (73.9 kg)       · Ideal Body Weight: 178 lbs; % Ideal Body Weight 91.6 %   · BMI: 22.1  · Adjusted Body Weight: 182.2; Amputation   · Adjusted BMI: 24.7    · BMI Categories: Normal Weight (BMI 18.5-24. 9)       Nutrition Diagnosis:   · Increased nutrient needs related to increase demand for energy/nutrients as evidenced by wounds(left foot wound, transmetatarsal amputation)      Nutrition Interventions:   Food and/or Nutrient Delivery:  Continue Current Diet, Start Oral Nutrition Supplement  Nutrition Education/Counseling: Education not indicated   Coordination of Nutrition Care:  Continued Inpatient Monitoring    Goals:  PO intakes are greater than 75% at meals       Nutrition Monitoring and Evaluation:   Food/Nutrient Intake Outcomes:  Food and Nutrient Intake, Supplement Intake  Physical Signs/Symptoms Outcomes:  Biochemical Data, Fluid Status or Edema, Skin, Weight     Discharge Planning:    Continue Oral Nutrition Supplement, Continue current diet       Some areas of assessment maybe incomplete due to COVID-19 precautions.     Davina Max  MFN, RDN, LDN  Lead Clinical Dietitian  RD Office Phone [phone]

## 2020-08-29 NOTE — PLAN OF CARE
Nursing order placed for daily dressing changes L foot. Continue nonweightbearing in surgical shoe left foot. Patient to follow-up in [facility] podiatry clinic within 1 week of discharge. Podiatry to sign off at this time. Please perfect serve with any questions or concerns. Thank you for allowing us to participate in this patient's care.     Electronically signed by Pierce Saavedra DPM on 8/29/2020 at 1:02 PM

## 2020-08-29 NOTE — PROGRESS NOTES
Physical Therapy  DATE: 2020    NAME: Wei Rutherford  MRN: [id number]   : 1955    Patient not seen this date for Physical Therapy due to:  [] Blood transfusion in progress  [] Cancel by RN  [] Hemodialysis  [x]  Refusal by Patient        Pt reports he is not feeling well. Nursing notified of refusal  [] Spine Precautions   [] Strict Bedrest  [] Surgery  [] Testing      [] Other        [] PT being discontinued at this time. Patient independent. No further needs. [] PT being discontinued at this time as the patient has been transferred to hospice care. No further needs.     Anya Mark, PTA

## 2020-08-29 NOTE — PLAN OF CARE
Nutrition Problem #1: Increased nutrient needs  Intervention: Food and/or Nutrient Delivery: Continue Current Diet, Start Oral Nutrition Supplement  Nutritional Goals: PO intakes are greater than 75% at meals
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
## 2020-08-29 NOTE — PROGRESS NOTES
[facility]  Progress Note    Mike Chang  MRN: [id number]  Date: 8/29/2020  LOS: 7     Reason for F/U :   Left TMA stump infection    Impression :   1. Recent left transmetatarsal amputation 8/5/2020  2. Trans-metatarsal stump infection with wound dehiscence and possible underlying osteomyelitis of the metatarsals  · Status post bedside wound debridement 8/22/2020  · Status post incision and drainage of the left foot with revision of transmetatarsal amputation of the left foot 8/24/2020  · Cultures with VRE and Proteus mirabilis  3. Peripheral arterial disease status post revascularization  4. 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 · Discharge arrangements are being worked up  · We will continue to follow the platelet count while on the Z[employer]    Infection Control Recommendations:   Universal precautions    Discharge Planning:   Patient will need Midline Catheter Insertion/ PICC line Insertion: No  Patient willneed outpatient wound care: Yes    Medical Decision making / Summary of Stay:   Mike Chang is a 60y.o.-year-old male who was initially admitted on 8/22/2020. Joss Kumar has a history of peripheral arterial disease and has undergone revascularization procedures on the left in the past, esophageal cancer, diabetes mellitus with associated neuropathy and he was recently hospitalized with left foot plantar ulcerations of the great toe with associated gangrene and cellulitis of the foot. The patient subsequently underwent transmetatarsal amputation of the left foot with Achilles tendon lengthening on 8/5/2020. The patient was discharged to a rehabilitation facility on 8/7/2020 on oral antimicrobial therapy with Augmentin and doxycycline for a week.   The patient states that he never had any wound care done in the time since his discharge and he started to feel \"funny\" though he cannot really specify what this means exactly and ended up going back into the emergency room. The patient had dressing change done which showed devitalized tissue as well as an x-ray that showed some gaseous soft tissue changes and the patient was seen by the podiatry service had a debridement done at bedside. I was asked to evaluate if there was concern for osteomyelitis as the open wounds did have 3 metatarsal bones exposed. The patient was taken to the operating room 2020  The patient had all necrotic and fibrotic tissue debrided until bleeding tissue was noted with no purulent drainage and hard cortical bone. Current evaluation:2020    /62   Pulse 92   Temp 97.7 °F (36.5 °C) (Oral)   Resp 16   Ht 6' (1.829 m)   Wt 163 lb 3.2 oz (74 kg)   SpO2 96%   BMI 22.13 kg/m²     Temperature Range: Temp: 97.7 °F (36.5 °C) Temp  Av.8 °F (36.6 °C)  Min: 97.7 °F (36.5 °C)  Max: 97.9 °F (36.6 °C)  The patient is seen and evaluated at bedside he is awake and alert in no acute distress. He does not have any hallucinations. He reports that he did not sleep very well overnight. He does not have any reflux symptoms. Review of Systems   Constitutional: Negative. Respiratory: Negative. Cardiovascular: Negative. Gastrointestinal: Negative. Genitourinary: Negative. Musculoskeletal: Negative. Allergic/Immunologic: Negative. Neurological: Negative. Psychiatric/Behavioral: Positive for hallucinations. Physical Examination :     Physical Exam  Constitutional:       Appearance: He is well-developed. HENT:      Head: Normocephalic and atraumatic. Neck:      Musculoskeletal: Normal range of motion and neck supple. Cardiovascular:      Rate and Rhythm: Normal rate. Heart sounds: Normal heart sounds. No friction rub. No gallop.     Pulmonary:      Effort: Pulmonary effort is normal.      Breath sounds: Normal breath sounds. No wheezing. Abdominal:      General: Bowel sounds are normal.      Palpations: Abdomen is soft. There is no mass. Tenderness: There is no abdominal tenderness. Musculoskeletal:      Comments: Right below the knee amputation   Lymphadenopathy:      Cervical: No cervical adenopathy. Skin:     General: Skin is warm and dry. Comments: There is a dressing in the left foot   Neurological:      Mental Status: He is alert and oriented to person, place, and time. Laboratory data:   I have independently reviewed the followinglabs:  CBC with Differential:   No results for input(s): WBC, HGB, HCT, PLT, SEGSPCT, BANDSPCT, LYMPHOPCT, MONOPCT, EOSPCT in the last 72 hours. BMP:   Recent Labs     08/28/20  0522   BUN 16   CREATININE 0.88     Hepatic Function Panel: No results for input(s): PROT, LABALBU, BILIDIR, IBILI, BILITOT, ALKPHOS, ALT, AST in the last 72 hours. No results found for: PROCAL  Lab Results   Component Value Date    CRP 51.5 08/28/2020    CRP 54.8 08/26/2020    CRP 31.4 08/24/2020     Lab Results   Component Value Date    SEDRATE 74 (H) 08/22/2020       Lab Results   Component Value Date    DDIMER 0.39 06/29/2020    DDIMER 1.63 12/20/2017    DDIMER 0.68 12/25/2011    FERRITIN 64 01/25/2014       No results for input(s): VANCOTROUGH in the last 72 hours. Imaging Studies:   No new imaging    Cultures:     Culture, Anaerobic and Aerobic [[id number]]  (Abnormal)  Collected: 08/24/20 1923    Order Status: Completed  Specimen: Swab from Foot  Updated: 08/28/20 1603     Specimen Description  . FOOT     Special Requests  NOT REPORTED     Direct Exam  RARE NEUTROPHILSAbnormal        NO BACTERIA SEEN     Culture  PROTEUS MIRABILIS SCANT GROWTH For susceptibility, refer to previous culture. Abnormal        NO ANAEROBIC ORGANISMS ISOLATED AT 4 DAYSAbnormal       Culture, Anaerobic and Aerobic [[id number]]  (Abnormal)   Collected: 08/24/20 3563 Order Status: Completed  Specimen: Bone from Foot  Updated: 08/28/20 1600     Specimen Description  . FOOT     Special Requests  NOT REPORTED     Direct Exam  RARE NEUTROPHILSAbnormal        RARE GRAM POSITIVE RODSAbnormal       Culture  PROTEUS MIRABILIS LIGHT GROWTHAbnormal        VANCOMYCIN RESISTANT ENTEROCOCCUS FAECALIS SCANT GROWTHAbnormal        STAPHYLOCOCCUS SPECIES, COAGULASE NEGATIVE SCANT GROWTH Susceptibilities have not been performed.  Notify the laboratory within 7 days for further workup if clinically indicated. Abnormal        NO ANAEROBIC ORGANISMS ISOLATED AT 4 DAYSAbnormal     Proteus mirabilis (1)     Antibiotic  Interpretation  DARIAN  Status     amikacin    Preliminary      NOT REPORTED    ampicillin  Sensitive   Preliminary      <=2   SUSCEPTIBLE    ampicillin-sulbactam    Preliminary      NOT REPORTED    aztreonam  Sensitive   Preliminary      <=1   SUSCEPTIBLE    ceFAZolin  Sensitive   Preliminary      <=4   SUSCEPTIBLE    cefepime    Preliminary      NOT REPORTED    cefTRIAXone  Sensitive   Preliminary      <=1   SUSCEPTIBLE    ciprofloxacin  Sensitive   Preliminary      <=0.25   SUSCEPTIBLE    ertapenem    Preliminary      NOT REPORTED    gentamicin  Sensitive   Preliminary      <=1   SUSCEPTIBLE    meropenem    Preliminary      NOT REPORTED    nitrofurantoin    Preliminary      NOT REPORTED    tigecycline    Preliminary      NOT REPORTED    tobramycin  Sensitive   Preliminary      <=1   SUSCEPTIBLE    trimethoprim-sulfamethoxazole  Sensitive   Preliminary      <=20   SUSCEPTIBLE    piperacillin-tazobactam  Sensitive   Preliminary      <=4   SUSCEPTIBLE    Vancomycin resistant enterococcus faecalis (2)     Antibiotic  Interpretation  DARIAN  Status     ampicillin  Sensitive   Preliminary      <=2   SUSCEPTIBLE    penicillin    Preliminary      NOT REPORTED    ciprofloxacin    Preliminary      NOT REPORTED    erythromycin    Preliminary      NOT REPORTED    Gentamicin, High Level   NOT REPORTED Preliminary     levofloxacin    Preliminary      NOT REPORTED    linezolid  Sensitive   Preliminary      1   SUSCEPTIBLE    nitrofurantoin    Preliminary      NOT REPORTED    Synercid    Preliminary      NOT REPORTED    Streptomycin, Hi Level   NOT REPORTED  Preliminary     tetracycline    Preliminary      NOT REPORTED    tigecycline    Preliminary      NOT REPORTED    vancomycin  Resistant   Preliminary      >=32   RESISTANT      Culture, Anaerobic and Aerobic [[id number]]  (Abnormal)  Collected: 08/24/20 1900    Order Status: Completed  Specimen: Bone from Foot  Updated: 08/28/20 1600     Specimen Description  . FOOT     Special Requests  NOT REPORTED     Direct Exam  NO NEUTROPHILS SEEN      NO BACTERIA SEEN     Culture  PROTEUS MIRABILIS LIGHT GROWTH For susceptibility, refer to previous culture. Abnormal        GROUP D ENTEROCOCCUS SCANT GROWTH For susceptibility, refer to previous culture. Abnormal        NO ANAEROBIC ORGANISMS ISOLATED AT 4 DAYSAbnormal       Culture, Blood 1 [[id number]]   Collected: 08/22/20 1711    Order Status: Completed  Specimen: Blood  Updated: 08/28/20 2645     Specimen Description  . BLOOD     Special Requests  L HAND 4 ML     Culture  NO GROWTH 6 DAYS    Culture, Blood 1 [[id number]]   Collected: 08/22/20 1711    Order Status: Completed  Specimen: Blood  Updated: 08/28/20 9177     Specimen Description  . BLOOD     Special Requests  R AC 6 ML     Culture  NO GROWTH 6 DAYS    Culture, Anaerobic and Aerobic [[id number]]  (Abnormal)  Collected: 08/22/20 1830    Order Status: Completed  Specimen: Foot  Updated: 08/27/20 1541     Specimen Description  . FOOT     Special Requests  NOT REPORTED     Direct Exam  RARE NEUTROPHILSAbnormal        MANY GRAM NEGATIVE RODSAbnormal        FEW GRAM POSITIVE COCCI IN PAIRSAbnormal       Culture  NON LACTOSE FERMENTING GRAM NEGATIVE RODS MODERATE GROWTHAbnormal        PROTEUS SPECIES MODERATE GROWTHAbnormal        NON LACTOSE FERMENTING GRAM NEGATIVE RODS DIFFERENT MORPHOLOGY MODERATE GROWTHAbnormal        PRESUMPTIVE ID: GROUP D ENTEROCOCCUS MODERATE GROWTH      This is a mixed culture of organisms, which may represent colonization or contamination.  Notify the laboratory within 7 days for further workup.  Susceptibilities have not been performed. Abnormal        NO ANAEROBIC ORGANISMS ISOLATED AT 5 DAYSAbnormal           Medications:      ciprofloxacin  500 mg Oral 2 times per day    linezolid  600 mg Oral 2 times per day    polyethylene glycol  17 g Oral BID    insulin lispro  0-18 Units Subcutaneous TID WC    insulin lispro  0-9 Units Subcutaneous Nightly    apixaban  5 mg Oral BID    aspirin  81 mg Oral Daily    atorvastatin  40 mg Oral Daily    dilTIAZem  60 mg Oral 4x Daily    famotidine  20 mg Oral BID    ipratropium-albuterol  3 mL Inhalation Q4H WA    metFORMIN  500 mg Oral BID WC    nortriptyline  50 mg Oral Nightly    sodium chloride flush  10 mL Intravenous 2 times per day    insulin glargine  70 Units Subcutaneous Nightly           [facility]  [address] [place]  Perfect Serve messaging  OFFICE: [phone]      Electronically signed by [address], [place] on 8/29/2020 at 10:58 AM  Thank you for allowing us to participate in the care of this patient. Please call with questions. This note iscreated with the assistance of a speech recognition program.  While intending to generate a document that actually reflects the content of the visit, the document can still have some errors including those of syntax andsound a like substitutions which may escape proof reading. In such instances, actual meaning can be extrapolated by contextual diversion.

## 2020-08-29 NOTE — CARE COORDINATION
Social Work-[facility] may have bed on Mon. Discussed with patient other options,if [facility]. He would like referral sent to [facility]. Sent referral. They are not in network. Will meet with patient Mon for other choices, if they do not have bed. Patient will also need a precert.  Mehran Strange

## 2020-08-29 NOTE — PLAN OF CARE
Problem: Falls - Risk of:  Goal: Will remain free from falls  Description: Will remain free from falls  8/29/2020 1357 by Raisa Brito RN  Note: Patient is a fall risk during this admission. Fall risk assessment was performed. Patient is absent of falls. Bed is in the lowest position. Wheels on the bed are locked. Call light and bed side table are within reach. Clutter is removed. Patient was educated to call out when needing assistance or wanting to get out of bed. Patient offered toileting assistance during rounding. Hourly rounds have been performed. Fall precautions in place, using bed and chair alarms  8/29/2020 0102 by Dewey Dexter RN  Outcome: Ongoing  Goal: Absence of physical injury  Description: Absence of physical injury  8/29/2020 0102 by Dewey Dexter RN  Outcome: Ongoing     Problem: Skin Integrity:  Goal: Will show no infection signs and symptoms  Description: Will show no infection signs and symptoms  8/29/2020 1357 by Raisa Brito RN  Outcome: Ongoing  Note: On po antibiotics  8/29/2020 0102 by Dewey Dexter RN  Outcome: Ongoing  Goal: Absence of new skin breakdown  Description: Absence of new skin breakdown  8/29/2020 1357 by Raisa Brito RN  Outcome: Ongoing  8/29/2020 0102 by Dewey Dexter RN  Outcome: Ongoing  Goal: Risk for impaired skin integrity will decrease  Description: Risk for impaired skin integrity will decrease  8/29/2020 0102 by Dewey Dexter RN  Outcome: Ongoing     Problem: Pain:  Goal: Pain level will decrease  Description: Pain level will decrease  8/29/2020 1357 by Raisa Brito RN  Outcome: Ongoing  Note: Pain level assessment complete.    Patient educated on pain scale and control interventions  PRN pain medication given per patient request  Patient instructed to call out with new onset of pain or unrelieved pain , pt has not taken anything for pain this shift, c/o feeling tired today and has rested with eyes closed  8/29/2020 0102 by Dewey Dexter RN  Outcome: Ongoing  Goal: Control of acute pain  Description: Control of acute pain  8/29/2020 0102 by Alejandro Abreu RN  Outcome: Ongoing  Goal: Control of chronic pain  Description: Control of chronic pain  8/29/2020 0102 by Alejandro Abreu RN  Outcome: Ongoing     Problem: Tobacco Use:  Goal: Inpatient tobacco use cessation counseling participation  Description: Inpatient tobacco use cessation counseling participation  8/29/2020 1357 by José Orourke RN  Outcome: Ongoing  8/29/2020 0102 by Alejandro Abreu RN  Outcome: Ongoing     Problem:  Activity:  Goal: Risk for activity intolerance will decrease  Description: Risk for activity intolerance will decrease  8/29/2020 0102 by Alejandro Abreu RN  Outcome: Ongoing     Problem: Coping:  Goal: Ability to adjust to condition or change in health will improve  Description: Ability to adjust to condition or change in health will improve  8/29/2020 0102 by Alejandro Abreu RN  Outcome: Ongoing     Problem: Fluid Volume:  Goal: Ability to maintain a balanced intake and output will improve  Description: Ability to maintain a balanced intake and output will improve  8/29/2020 0102 by Alejandro Abreu RN  Outcome: Ongoing     Problem: Health Behavior:  Goal: Ability to identify and utilize available resources and services will improve  Description: Ability to identify and utilize available resources and services will improve  8/29/2020 0102 by Alejandro Abreu RN  Outcome: Ongoing  Goal: Ability to manage health-related needs will improve  Description: Ability to manage health-related needs will improve  8/29/2020 0102 by Alejandro Abreu RN  Outcome: Ongoing     Problem: Metabolic:  Goal: Ability to maintain appropriate glucose levels will improve  Description: Ability to maintain appropriate glucose levels will improve  8/29/2020 1357 by José Orourke RN  Note: Checking blood sugars before meals and at bedtime and giving meds as ordered  8/29/2020 0102 by Alejandro Abreu RN  Outcome: Ongoing     Problem: Nutritional:  Goal: Maintenance of adequate nutrition will improve  Description: Maintenance of adequate nutrition will improve  8/29/2020 1357 by Kavon Poole RN  Outcome: Ongoing  8/29/2020 0102 by Mena Amor RN  Outcome: Ongoing  Goal: Progress toward achieving an optimal weight will improve  Description: Progress toward achieving an optimal weight will improve  8/29/2020 1357 by Kavon Poole RN  Outcome: Ongoing  8/29/2020 0102 by Mena Amor RN  Outcome: Ongoing     Problem: Physical Regulation:  Goal: Complications related to the disease process, condition or treatment will be avoided or minimized  Description: Complications related to the disease process, condition or treatment will be avoided or minimized  8/29/2020 1357 by Kavon Poole RN  Outcome: Ongoing  8/29/2020 0102 by Mena Amor RN  Outcome: Ongoing  Goal: Diagnostic test results will improve  Description: Diagnostic test results will improve  8/29/2020 0102 by Mena Amor RN  Outcome: Ongoing     Problem: Tissue Perfusion:  Goal: Adequacy of tissue perfusion will improve  Description: Adequacy of tissue perfusion will improve  8/29/2020 1357 by Kavon Poole RN  Outcome: Ongoing  8/29/2020 0102 by Mena Amor RN  Outcome: Ongoing

## 2020-08-29 NOTE — PROGRESS NOTES
Progress Note  Podiatric Medicine and Surgery     Subjective     CC: s/p L TMA revision    POD#5 s/p left TMA revision (DOS: 8/24/20)  Patient seen and evaluated at bedside  afebrile  Pain controlled  denies n/c/f/v/new sob      HPI :  Sofia Leos is a 60 y.o. male seen at [facility] for surgical wound dehiscence and worsening infection of TMA stump of the left foot. Patient underwent TMA with flap closure of left foot on 08/05/2020. Patient was then admitted to SNF however after discharge from hospital patient never followed up with our clinic. He has not received any wound care or evaluation of the TMA stump since his discharge from the hospital.  Patient signed himself out from the SNF [facility] on Wednesday, 08/19/2020. Due to [place] signout patient was not given any medications or necessary supplies. Patient states he has been walking on the left lower extremity with a posterior splint intact despite nonweightbearing instruction. Patient relates \" not feeling so well\" the past couple days along with an increase in pain and malodor coming from the left TMA stump which prompted him to present to the ED today. Upon infectious work-up in the ED patient was found to have a blood glucose of 484 mg/dL, WBC of 13.7, ESR of 74. X-rays of the left foot were also obtained which demonstrated cortical irregularities to the distal third metatarsal suspicious for early osteomyelitis, small pocket of soft tissue emphysema dorsal distal aspect of the foot consistent with gas-forming infection. ROS: Denies N/V/F/C/SOB/CP. Otherwise negative except at stated in the HPI.      Medications:  Scheduled Meds:   ciprofloxacin  500 mg Oral 2 times per day    linezolid  600 mg Oral 2 times per day    polyethylene glycol  17 g Oral BID    insulin lispro  0-18 Units Subcutaneous TID WC    insulin lispro  0-9 Units Subcutaneous Nightly    apixaban  5 mg Oral BID    aspirin  81 mg Oral Daily    atorvastatin  40 mg Oral Daily  dilTIAZem  60 mg Oral 4x Daily    famotidine  20 mg Oral BID    ipratropium-albuterol  3 mL Inhalation Q4H WA    metFORMIN  500 mg Oral BID WC    nortriptyline  50 mg Oral Nightly    sodium chloride flush  10 mL Intravenous 2 times per day    insulin glargine  70 Units Subcutaneous Nightly       Continuous Infusions:   sodium chloride 75 mL/hr at 20 0600    dextrose         PRN Meds:aluminum & magnesium hydroxide-simethicone, morphine, oxyCODONE-acetaminophen **OR** oxyCODONE-acetaminophen, docusate sodium, sodium chloride flush, potassium chloride **OR** potassium alternative oral replacement **OR** potassium chloride, magnesium sulfate, acetaminophen **OR** acetaminophen, polyethylene glycol, promethazine **OR** ondansetron, nicotine, glucose, dextrose, glucagon (rDNA), dextrose    Objective     Vitals:  Patient Vitals for the past 8 hrs:   BP Temp Temp src Pulse Resp SpO2 Weight   20 0805 131/62 97.7 °F (36.5 °C) Oral 92 16 96 % --   20 0618 -- -- -- -- -- -- 163 lb 3.2 oz (74 kg)     Average, Min, and Max for last 24 hours Vitals:  TEMPERATURE:  Temp  Av.8 °F (36.6 °C)  Min: 97.7 °F (36.5 °C)  Max: 97.9 °F (36.6 °C)    RESPIRATIONS RANGE: Resp  Av  Min: 16  Max: 18    PULSE RANGE: Pulse  Av.7  Min: 92  Max: 100    BLOOD PRESSURE RANGE:  Systolic (48WCV), FMX:461 , Min:127 , QHI:390   ; Diastolic (27ODH), IXX:29, Min:57, Max:71      PULSE OXIMETRY RANGE: SpO2  Av %  Min: 92 %  Max: 96 %    I/O last 3 completed shifts: In: 1705 [P.O.:700; I.V.:689]  Out: 1700 [Urine:1700]    CBC:  Recent Labs     20  05   CRP 51.5*        BMP:  Recent Labs     20   BUN 16   CREATININE 0.88        Coags:  No results for input(s): APTT, PROT, INR in the last 72 hours. Lab Results   Component Value Date    SEDRATE 74 (H) 2020     Recent Labs     20  0522   CRP 51.5*       Physical Exam:  Right-sided BKA     Vascular: DP and PT pulses are palpable. CFT brisk to flap edges. Hair growth is absent to the level of the digits. Mild nonpitting edema distal stump.       Neuro: Saph/sural/SP/DP/plantar sensation absent to light touch.     Musculoskeletal: Muscle strength is adequate ROM, adequate strength to all lower extremity muscle groups. Compartments are soft compressible. gross deformity is present with right-sided BKA and left TMA stump. No pain with compression of posterior calf.     Dermatologic: Incision of the TMA site well coapted with an opening centrally. Wound probes deep plantar medially approximately 3.5 cm. no  drainage expressed, no purulence. No periwound erythema, no increase in warmth. No malodor. No fluctuance or obvious drainable fluid collection. Clinical Images:                  Imaging:   XR FOOT LEFT (MIN 3 VIEWS)   Final Result   Interval decreased air within the caudal soft tissues overlying the left foot   mid metatarsal amputation site suggesting interval debridement/drainage. XR FOOT LEFT (2 VIEWS)   Final Result   Interval increased distal soft tissue swelling and soft tissue gas concerning   for presence of cellulitis with gas-forming organism. XR FOOT LEFT (MIN 3 VIEWS)   Final Result   Skin defect with soft tissue swelling and lucencies of the amputation stump   likely representing cellulitis with gas-forming organisms. Underlying 3rd   metatarsal shows subtle distal marginal irregularity on the oblique view. This could be accentuated by the soft tissue lucencies nonetheless concerning   for osteomyelitis. Cultures:   Cx 8/22: nonlactose fermenting GNR, proteus, GPC, GNR  Cx 8/24 pre irrigation (bone): VRE, proteus, staph coag neg GNR, GPC  Cx 8/24 post irrigation (bone): Group D enterococcus, proteus  Cx 8/24 swabs post irrigation: proteus    Assessment   Kumar Morris is a 60 y.o. male with   1. TMA revision, left foot (DOS: 8/24/20)  2.  Surgical wound dehiscence, left

## 2020-08-29 NOTE — PLAN OF CARE
Problem: Falls - Risk of:  Goal: Will remain free from falls  Description: Will remain free from falls  Outcome: Ongoing  Goal: Absence of physical injury  Description: Absence of physical injury  Outcome: Ongoing     Problem: Skin Integrity:  Goal: Will show no infection signs and symptoms  Description: Will show no infection signs and symptoms  Outcome: Ongoing  Goal: Absence of new skin breakdown  Description: Absence of new skin breakdown  Outcome: Ongoing  Goal: Risk for impaired skin integrity will decrease  Description: Risk for impaired skin integrity will decrease  Outcome: Ongoing     Problem: Pain:  Goal: Pain level will decrease  Description: Pain level will decrease  Outcome: Ongoing  Goal: Control of acute pain  Description: Control of acute pain  Outcome: Ongoing  Goal: Control of chronic pain  Description: Control of chronic pain  Outcome: Ongoing     Problem: Tobacco Use:  Goal: Inpatient tobacco use cessation counseling participation  Description: Inpatient tobacco use cessation counseling participation  Outcome: Ongoing     Problem:  Activity:  Goal: Risk for activity intolerance will decrease  Description: Risk for activity intolerance will decrease  Outcome: Ongoing     Problem: Coping:  Goal: Ability to adjust to condition or change in health will improve  Description: Ability to adjust to condition or change in health will improve  Outcome: Ongoing     Problem: Fluid Volume:  Goal: Ability to maintain a balanced intake and output will improve  Description: Ability to maintain a balanced intake and output will improve  Outcome: Ongoing     Problem: Health Behavior:  Goal: Ability to identify and utilize available resources and services will improve  Description: Ability to identify and utilize available resources and services will improve  Outcome: Ongoing  Goal: Ability to manage health-related needs will improve  Description: Ability to manage health-related needs will improve  Outcome: Ongoing     Problem: Metabolic:  Goal: Ability to maintain appropriate glucose levels will improve  Description: Ability to maintain appropriate glucose levels will improve  Outcome: Ongoing     Problem: Nutritional:  Goal: Maintenance of adequate nutrition will improve  Description: Maintenance of adequate nutrition will improve  Outcome: Ongoing  Goal: Progress toward achieving an optimal weight will improve  Description: Progress toward achieving an optimal weight will improve  Outcome: Ongoing     Problem: Physical Regulation:  Goal: Complications related to the disease process, condition or treatment will be avoided or minimized  Description: Complications related to the disease process, condition or treatment will be avoided or minimized  Outcome: Ongoing  Goal: Diagnostic test results will improve  Description: Diagnostic test results will improve  Outcome: Ongoing     Problem: Tissue Perfusion:  Goal: Adequacy of tissue perfusion will improve  Description: Adequacy of tissue perfusion will improve  Outcome: Ongoing

## 2020-08-29 NOTE — PROGRESS NOTES
Pt has rested most of the day, has only requested pain med x 1, tolerated only small portions of breakfast and lunch but ate over 75% dinner, pt states that \"he just doesn't feel well today\", talked with the  earlier and planned on being placed to SNF and she is working on it, now telling this nurse \"might just go home on Monday\",  Had small emesis earlier today

## 2020-08-30 LAB
BUN BLDV-MCNC: 24 MG/DL (ref 8–23)
C-REACTIVE PROTEIN: 95.6 MG/L (ref 0–5)
CREAT SERPL-MCNC: 1.13 MG/DL (ref 0.7–1.2)
GFR AFRICAN AMERICAN: >60 ML/MIN
GFR NON-AFRICAN AMERICAN: >60 ML/MIN
GFR SERPL CREATININE-BSD FRML MDRD: ABNORMAL ML/MIN/{1.73_M2}
GFR SERPL CREATININE-BSD FRML MDRD: ABNORMAL ML/MIN/{1.73_M2}
GLUCOSE BLD-MCNC: 137 MG/DL (ref 75–110)
GLUCOSE BLD-MCNC: 159 MG/DL (ref 75–110)
GLUCOSE BLD-MCNC: 352 MG/DL (ref 75–110)
GLUCOSE BLD-MCNC: 91 MG/DL (ref 75–110)

## 2020-08-30 PROCEDURE — 6370000000 HC RX 637 (ALT 250 FOR IP): Performed by: STUDENT IN AN ORGANIZED HEALTH CARE EDUCATION/TRAINING PROGRAM

## 2020-08-30 PROCEDURE — 36415 COLL VENOUS BLD VENIPUNCTURE: CPT

## 2020-08-30 PROCEDURE — 82565 ASSAY OF CREATININE: CPT

## 2020-08-30 PROCEDURE — 6370000000 HC RX 637 (ALT 250 FOR IP): Performed by: FAMILY MEDICINE

## 2020-08-30 PROCEDURE — 99232 SBSQ HOSP IP/OBS MODERATE 35: CPT | Performed by: FAMILY MEDICINE

## 2020-08-30 PROCEDURE — 94640 AIRWAY INHALATION TREATMENT: CPT

## 2020-08-30 PROCEDURE — 1200000000 HC SEMI PRIVATE

## 2020-08-30 PROCEDURE — 84520 ASSAY OF UREA NITROGEN: CPT

## 2020-08-30 PROCEDURE — 2580000003 HC RX 258: Performed by: STUDENT IN AN ORGANIZED HEALTH CARE EDUCATION/TRAINING PROGRAM

## 2020-08-30 PROCEDURE — 86140 C-REACTIVE PROTEIN: CPT

## 2020-08-30 PROCEDURE — 6360000002 HC RX W HCPCS: Performed by: FAMILY MEDICINE

## 2020-08-30 PROCEDURE — 6370000000 HC RX 637 (ALT 250 FOR IP): Performed by: INTERNAL MEDICINE

## 2020-08-30 PROCEDURE — 6370000000 HC RX 637 (ALT 250 FOR IP): Performed by: NURSE PRACTITIONER

## 2020-08-30 PROCEDURE — 2700000000 HC OXYGEN THERAPY PER DAY

## 2020-08-30 PROCEDURE — 82947 ASSAY GLUCOSE BLOOD QUANT: CPT

## 2020-08-30 RX ORDER — IPRATROPIUM BROMIDE AND ALBUTEROL SULFATE 2.5; .5 MG/3ML; MG/3ML
3 SOLUTION RESPIRATORY (INHALATION) EVERY 4 HOURS PRN
Status: DISCONTINUED | OUTPATIENT
Start: 2020-08-30 | End: 2020-09-03 | Stop reason: HOSPADM

## 2020-08-30 RX ADMIN — DILTIAZEM HYDROCHLORIDE 60 MG: 60 TABLET, FILM COATED ORAL at 17:51

## 2020-08-30 RX ADMIN — ASPIRIN 81 MG 81 MG: 81 TABLET ORAL at 08:57

## 2020-08-30 RX ADMIN — ALUMINUM HYDROXIDE, MAGNESIUM HYDROXIDE, AND SIMETHICONE 30 ML: 200; 200; 20 SUSPENSION ORAL at 02:24

## 2020-08-30 RX ADMIN — INSULIN LISPRO 7 UNITS: 100 INJECTION, SOLUTION INTRAVENOUS; SUBCUTANEOUS at 20:22

## 2020-08-30 RX ADMIN — DILTIAZEM HYDROCHLORIDE 60 MG: 60 TABLET, FILM COATED ORAL at 12:42

## 2020-08-30 RX ADMIN — ALUMINUM HYDROXIDE, MAGNESIUM HYDROXIDE, AND SIMETHICONE 30 ML: 200; 200; 20 SUSPENSION ORAL at 21:43

## 2020-08-30 RX ADMIN — LINEZOLID 600 MG: 600 TABLET, FILM COATED ORAL at 08:57

## 2020-08-30 RX ADMIN — INSULIN LISPRO 3 UNITS: 100 INJECTION, SOLUTION INTRAVENOUS; SUBCUTANEOUS at 12:43

## 2020-08-30 RX ADMIN — ATORVASTATIN CALCIUM 40 MG: 40 TABLET, FILM COATED ORAL at 08:57

## 2020-08-30 RX ADMIN — FAMOTIDINE 20 MG: 20 TABLET ORAL at 08:57

## 2020-08-30 RX ADMIN — APIXABAN 5 MG: 5 TABLET, FILM COATED ORAL at 08:57

## 2020-08-30 RX ADMIN — DILTIAZEM HYDROCHLORIDE 60 MG: 60 TABLET, FILM COATED ORAL at 08:57

## 2020-08-30 RX ADMIN — CIPROFLOXACIN 500 MG: 500 TABLET ORAL at 20:24

## 2020-08-30 RX ADMIN — METFORMIN HYDROCHLORIDE 500 MG: 500 TABLET ORAL at 17:51

## 2020-08-30 RX ADMIN — Medication 2 MG: at 21:39

## 2020-08-30 RX ADMIN — LINEZOLID 600 MG: 600 TABLET, FILM COATED ORAL at 20:23

## 2020-08-30 RX ADMIN — OXYCODONE HYDROCHLORIDE AND ACETAMINOPHEN 2 TABLET: 5; 325 TABLET ORAL at 06:00

## 2020-08-30 RX ADMIN — CIPROFLOXACIN 500 MG: 500 TABLET ORAL at 08:57

## 2020-08-30 RX ADMIN — OXYCODONE HYDROCHLORIDE AND ACETAMINOPHEN 2 TABLET: 5; 325 TABLET ORAL at 15:23

## 2020-08-30 RX ADMIN — OXYCODONE HYDROCHLORIDE AND ACETAMINOPHEN 2 TABLET: 5; 325 TABLET ORAL at 10:12

## 2020-08-30 RX ADMIN — POLYETHYLENE GLYCOL (3350) 17 G: 17 POWDER, FOR SOLUTION ORAL at 08:57

## 2020-08-30 RX ADMIN — INSULIN GLARGINE 70 UNITS: 100 INJECTION, SOLUTION SUBCUTANEOUS at 20:23

## 2020-08-30 RX ADMIN — POLYETHYLENE GLYCOL (3350) 17 G: 17 POWDER, FOR SOLUTION ORAL at 20:25

## 2020-08-30 RX ADMIN — METFORMIN HYDROCHLORIDE 500 MG: 500 TABLET ORAL at 08:57

## 2020-08-30 RX ADMIN — DILTIAZEM HYDROCHLORIDE 60 MG: 60 TABLET, FILM COATED ORAL at 20:24

## 2020-08-30 RX ADMIN — SODIUM CHLORIDE, PRESERVATIVE FREE 10 ML: 5 INJECTION INTRAVENOUS at 22:29

## 2020-08-30 RX ADMIN — FAMOTIDINE 20 MG: 20 TABLET ORAL at 20:24

## 2020-08-30 RX ADMIN — OXYCODONE HYDROCHLORIDE AND ACETAMINOPHEN 2 TABLET: 5; 325 TABLET ORAL at 00:03

## 2020-08-30 RX ADMIN — OXYCODONE HYDROCHLORIDE AND ACETAMINOPHEN 2 TABLET: 5; 325 TABLET ORAL at 19:28

## 2020-08-30 RX ADMIN — APIXABAN 5 MG: 5 TABLET, FILM COATED ORAL at 20:24

## 2020-08-30 RX ADMIN — NORTRIPTYLINE HYDROCHLORIDE 50 MG: 25 CAPSULE ORAL at 20:23

## 2020-08-30 RX ADMIN — IPRATROPIUM BROMIDE AND ALBUTEROL SULFATE 3 ML: .5; 3 SOLUTION RESPIRATORY (INHALATION) at 20:12

## 2020-08-30 ASSESSMENT — PAIN DESCRIPTION - ORIENTATION
ORIENTATION: UPPER;LOWER
ORIENTATION: LEFT
ORIENTATION: UPPER
ORIENTATION: LEFT

## 2020-08-30 ASSESSMENT — PAIN - FUNCTIONAL ASSESSMENT
PAIN_FUNCTIONAL_ASSESSMENT: ACTIVITIES ARE NOT PREVENTED

## 2020-08-30 ASSESSMENT — PAIN DESCRIPTION - LOCATION
LOCATION: FOOT
LOCATION: FOOT
LOCATION: BACK
LOCATION: FOOT
LOCATION: FOOT
LOCATION: BACK

## 2020-08-30 ASSESSMENT — ENCOUNTER SYMPTOMS
RHINORRHEA: 0
NAUSEA: 0
BACK PAIN: 0
WHEEZING: 0
SINUS PRESSURE: 0
CONSTIPATION: 0
CHOKING: 0
ABDOMINAL PAIN: 0
DIARRHEA: 0
COUGH: 0
VOICE CHANGE: 0
VOMITING: 0
CHEST TIGHTNESS: 0
SHORTNESS OF BREATH: 0

## 2020-08-30 ASSESSMENT — PAIN DESCRIPTION - DESCRIPTORS
DESCRIPTORS: ACHING

## 2020-08-30 ASSESSMENT — PAIN DESCRIPTION - FREQUENCY
FREQUENCY: INTERMITTENT

## 2020-08-30 ASSESSMENT — PAIN SCALES - GENERAL
PAINLEVEL_OUTOF10: 0
PAINLEVEL_OUTOF10: 0
PAINLEVEL_OUTOF10: 7
PAINLEVEL_OUTOF10: 0
PAINLEVEL_OUTOF10: 6
PAINLEVEL_OUTOF10: 8
PAINLEVEL_OUTOF10: 7
PAINLEVEL_OUTOF10: 8
PAINLEVEL_OUTOF10: 0

## 2020-08-30 ASSESSMENT — PAIN DESCRIPTION - PAIN TYPE
TYPE: CHRONIC PAIN
TYPE: ACUTE PAIN
TYPE: ACUTE PAIN
TYPE: CHRONIC PAIN
TYPE: ACUTE PAIN
TYPE: ACUTE PAIN

## 2020-08-30 ASSESSMENT — PAIN DESCRIPTION - PROGRESSION
CLINICAL_PROGRESSION: GRADUALLY WORSENING

## 2020-08-30 ASSESSMENT — PAIN DESCRIPTION - ONSET
ONSET: ON-GOING
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
## 2020-08-30 NOTE — CARE COORDINATION
Social work: Conversation has with patient for more snf choices- as [facility] may not have bed. Writer and patient reviewed list, he may consider [facility], but needs to talk it over with his sister before making decision. SW informed him sw will revisit Monday with decision from [facility] and [facility] and to confirm second choice, if [facility] is full.

## 2020-08-31 LAB
GLUCOSE BLD-MCNC: 106 MG/DL (ref 75–110)
GLUCOSE BLD-MCNC: 143 MG/DL (ref 75–110)
GLUCOSE BLD-MCNC: 229 MG/DL (ref 75–110)
GLUCOSE BLD-MCNC: 238 MG/DL (ref 75–110)
GLUCOSE BLD-MCNC: 272 MG/DL (ref 75–110)

## 2020-08-31 PROCEDURE — 6360000002 HC RX W HCPCS: Performed by: FAMILY MEDICINE

## 2020-08-31 PROCEDURE — 2580000003 HC RX 258: Performed by: STUDENT IN AN ORGANIZED HEALTH CARE EDUCATION/TRAINING PROGRAM

## 2020-08-31 PROCEDURE — 6370000000 HC RX 637 (ALT 250 FOR IP): Performed by: NURSE PRACTITIONER

## 2020-08-31 PROCEDURE — 6370000000 HC RX 637 (ALT 250 FOR IP): Performed by: STUDENT IN AN ORGANIZED HEALTH CARE EDUCATION/TRAINING PROGRAM

## 2020-08-31 PROCEDURE — 99232 SBSQ HOSP IP/OBS MODERATE 35: CPT | Performed by: FAMILY MEDICINE

## 2020-08-31 PROCEDURE — 6370000000 HC RX 637 (ALT 250 FOR IP): Performed by: INTERNAL MEDICINE

## 2020-08-31 PROCEDURE — 94640 AIRWAY INHALATION TREATMENT: CPT

## 2020-08-31 PROCEDURE — 99231 SBSQ HOSP IP/OBS SF/LOW 25: CPT | Performed by: INTERNAL MEDICINE

## 2020-08-31 PROCEDURE — 6370000000 HC RX 637 (ALT 250 FOR IP): Performed by: FAMILY MEDICINE

## 2020-08-31 PROCEDURE — 1200000000 HC SEMI PRIVATE

## 2020-08-31 PROCEDURE — 97116 GAIT TRAINING THERAPY: CPT

## 2020-08-31 PROCEDURE — 82947 ASSAY GLUCOSE BLOOD QUANT: CPT

## 2020-08-31 RX ORDER — OXYCODONE HYDROCHLORIDE AND ACETAMINOPHEN 5; 325 MG/1; MG/1
1-2 TABLET ORAL EVERY 4 HOURS PRN
Qty: 20 TABLET | Refills: 0 | Status: SHIPPED | OUTPATIENT
Start: 2020-08-31 | End: 2020-09-03

## 2020-08-31 RX ORDER — NICOTINE 21 MG/24HR
1 PATCH, TRANSDERMAL 24 HOURS TRANSDERMAL DAILY PRN
Qty: 30 PATCH | Refills: 3 | DISCHARGE
Start: 2020-08-31 | End: 2020-09-03

## 2020-08-31 RX ORDER — ALBUTEROL SULFATE 90 UG/1
2 AEROSOL, METERED RESPIRATORY (INHALATION) EVERY 6 HOURS PRN
Status: DISCONTINUED | OUTPATIENT
Start: 2020-08-31 | End: 2020-09-03 | Stop reason: HOSPADM

## 2020-08-31 RX ORDER — PSEUDOEPHEDRINE HCL 30 MG
100 TABLET ORAL 2 TIMES DAILY PRN
DISCHARGE
Start: 2020-08-31 | End: 2020-09-03

## 2020-08-31 RX ORDER — ALBUTEROL SULFATE 90 UG/1
2 AEROSOL, METERED RESPIRATORY (INHALATION) EVERY 6 HOURS PRN
COMMUNITY
End: 2022-03-24 | Stop reason: SDUPTHER

## 2020-08-31 RX ORDER — LIDOCAINE 40 MG/G
CREAM TOPICAL PRN
Status: DISCONTINUED | OUTPATIENT
Start: 2020-08-31 | End: 2020-09-03 | Stop reason: HOSPADM

## 2020-08-31 RX ORDER — LINEZOLID 600 MG/1
600 TABLET, FILM COATED ORAL EVERY 12 HOURS SCHEDULED
Qty: 50 TABLET | Refills: 0 | DISCHARGE
Start: 2020-08-31 | End: 2020-09-03

## 2020-08-31 RX ORDER — DOCUSATE SODIUM 100 MG/1
100 CAPSULE, LIQUID FILLED ORAL 2 TIMES DAILY
Status: DISCONTINUED | OUTPATIENT
Start: 2020-08-31 | End: 2020-09-03 | Stop reason: HOSPADM

## 2020-08-31 RX ORDER — POLYETHYLENE GLYCOL 3350 17 G/17G
17 POWDER, FOR SOLUTION ORAL 2 TIMES DAILY
Qty: 527 G | Refills: 1 | DISCHARGE
Start: 2020-08-31 | End: 2020-09-03

## 2020-08-31 RX ORDER — NALOXONE HYDROCHLORIDE 4 MG/.1ML
1 SPRAY NASAL PRN
Qty: 1 EACH | Refills: 5 | Status: SHIPPED | OUTPATIENT
Start: 2020-08-31 | End: 2020-09-03 | Stop reason: SDUPTHER

## 2020-08-31 RX ORDER — CIPROFLOXACIN 500 MG/1
500 TABLET, FILM COATED ORAL EVERY 12 HOURS SCHEDULED
Qty: 50 TABLET | Refills: 0 | DISCHARGE
Start: 2020-08-31 | End: 2020-09-03

## 2020-08-31 RX ADMIN — DILTIAZEM HYDROCHLORIDE 60 MG: 60 TABLET, FILM COATED ORAL at 17:09

## 2020-08-31 RX ADMIN — CIPROFLOXACIN 500 MG: 500 TABLET ORAL at 08:22

## 2020-08-31 RX ADMIN — OXYCODONE HYDROCHLORIDE AND ACETAMINOPHEN 2 TABLET: 5; 325 TABLET ORAL at 10:53

## 2020-08-31 RX ADMIN — INSULIN LISPRO 3 UNITS: 100 INJECTION, SOLUTION INTRAVENOUS; SUBCUTANEOUS at 17:09

## 2020-08-31 RX ADMIN — DILTIAZEM HYDROCHLORIDE 60 MG: 60 TABLET, FILM COATED ORAL at 13:02

## 2020-08-31 RX ADMIN — METFORMIN HYDROCHLORIDE 500 MG: 500 TABLET ORAL at 17:09

## 2020-08-31 RX ADMIN — POLYETHYLENE GLYCOL (3350) 17 G: 17 POWDER, FOR SOLUTION ORAL at 21:12

## 2020-08-31 RX ADMIN — DILTIAZEM HYDROCHLORIDE 60 MG: 60 TABLET, FILM COATED ORAL at 21:09

## 2020-08-31 RX ADMIN — ATORVASTATIN CALCIUM 40 MG: 40 TABLET, FILM COATED ORAL at 08:22

## 2020-08-31 RX ADMIN — DOCUSATE SODIUM 100 MG: 100 CAPSULE, LIQUID FILLED ORAL at 10:53

## 2020-08-31 RX ADMIN — Medication 2 MG: at 21:05

## 2020-08-31 RX ADMIN — DILTIAZEM HYDROCHLORIDE 60 MG: 60 TABLET, FILM COATED ORAL at 08:23

## 2020-08-31 RX ADMIN — CIPROFLOXACIN 500 MG: 500 TABLET ORAL at 21:09

## 2020-08-31 RX ADMIN — OXYCODONE HYDROCHLORIDE AND ACETAMINOPHEN 2 TABLET: 5; 325 TABLET ORAL at 00:54

## 2020-08-31 RX ADMIN — ALBUTEROL SULFATE 2 PUFF: 90 AEROSOL, METERED RESPIRATORY (INHALATION) at 23:09

## 2020-08-31 RX ADMIN — DOCUSATE SODIUM 100 MG: 100 CAPSULE, LIQUID FILLED ORAL at 21:09

## 2020-08-31 RX ADMIN — FAMOTIDINE 20 MG: 20 TABLET ORAL at 08:23

## 2020-08-31 RX ADMIN — NORTRIPTYLINE HYDROCHLORIDE 50 MG: 25 CAPSULE ORAL at 21:09

## 2020-08-31 RX ADMIN — FAMOTIDINE 20 MG: 20 TABLET ORAL at 21:33

## 2020-08-31 RX ADMIN — LINEZOLID 600 MG: 600 TABLET, FILM COATED ORAL at 08:23

## 2020-08-31 RX ADMIN — OXYCODONE HYDROCHLORIDE AND ACETAMINOPHEN 2 TABLET: 5; 325 TABLET ORAL at 05:16

## 2020-08-31 RX ADMIN — APIXABAN 5 MG: 5 TABLET, FILM COATED ORAL at 21:09

## 2020-08-31 RX ADMIN — LIDOCAINE 4%: 4 CREAM TOPICAL at 21:09

## 2020-08-31 RX ADMIN — INSULIN LISPRO 6 UNITS: 100 INJECTION, SOLUTION INTRAVENOUS; SUBCUTANEOUS at 11:58

## 2020-08-31 RX ADMIN — ALUMINUM HYDROXIDE, MAGNESIUM HYDROXIDE, AND SIMETHICONE 30 ML: 200; 200; 20 SUSPENSION ORAL at 21:12

## 2020-08-31 RX ADMIN — INSULIN GLARGINE 70 UNITS: 100 INJECTION, SOLUTION SUBCUTANEOUS at 21:30

## 2020-08-31 RX ADMIN — LINEZOLID 600 MG: 600 TABLET, FILM COATED ORAL at 21:09

## 2020-08-31 RX ADMIN — SODIUM CHLORIDE, PRESERVATIVE FREE 10 ML: 5 INJECTION INTRAVENOUS at 19:33

## 2020-08-31 RX ADMIN — METFORMIN HYDROCHLORIDE 500 MG: 500 TABLET ORAL at 08:22

## 2020-08-31 RX ADMIN — ASPIRIN 81 MG 81 MG: 81 TABLET ORAL at 08:22

## 2020-08-31 RX ADMIN — OXYCODONE HYDROCHLORIDE AND ACETAMINOPHEN 2 TABLET: 5; 325 TABLET ORAL at 19:29

## 2020-08-31 RX ADMIN — INSULIN LISPRO 3 UNITS: 100 INJECTION, SOLUTION INTRAVENOUS; SUBCUTANEOUS at 21:31

## 2020-08-31 RX ADMIN — APIXABAN 5 MG: 5 TABLET, FILM COATED ORAL at 08:22

## 2020-08-31 ASSESSMENT — PAIN DESCRIPTION - ONSET
ONSET: ON-GOING

## 2020-08-31 ASSESSMENT — PAIN - FUNCTIONAL ASSESSMENT
PAIN_FUNCTIONAL_ASSESSMENT: ACTIVITIES ARE NOT PREVENTED

## 2020-08-31 ASSESSMENT — ENCOUNTER SYMPTOMS
NAUSEA: 0
VOMITING: 0
RESPIRATORY NEGATIVE: 1
RHINORRHEA: 0
SINUS PRESSURE: 0
CONSTIPATION: 1
GASTROINTESTINAL NEGATIVE: 1
ABDOMINAL PAIN: 0
CHEST TIGHTNESS: 0
DIARRHEA: 0
BACK PAIN: 0
SHORTNESS OF BREATH: 0
CHOKING: 0
WHEEZING: 0
VOICE CHANGE: 0
COUGH: 0

## 2020-08-31 ASSESSMENT — PAIN DESCRIPTION - ORIENTATION
ORIENTATION: LEFT

## 2020-08-31 ASSESSMENT — PAIN DESCRIPTION - PAIN TYPE
TYPE: ACUTE PAIN

## 2020-08-31 ASSESSMENT — PAIN DESCRIPTION - FREQUENCY
FREQUENCY: CONTINUOUS
FREQUENCY: INTERMITTENT
FREQUENCY: CONTINUOUS

## 2020-08-31 ASSESSMENT — PAIN DESCRIPTION - LOCATION
LOCATION: FOOT
LOCATION: FOOT
LOCATION: BACK;FOOT
LOCATION: FOOT

## 2020-08-31 ASSESSMENT — PAIN SCALES - GENERAL
PAINLEVEL_OUTOF10: 8
PAINLEVEL_OUTOF10: 7
PAINLEVEL_OUTOF10: 8

## 2020-08-31 ASSESSMENT — PAIN DESCRIPTION - DESCRIPTORS
DESCRIPTORS: ACHING;SHARP;CONSTANT
DESCRIPTORS: ACHING;SHARP;CONSTANT
DESCRIPTORS: ACHING
DESCRIPTORS: ACHING

## 2020-08-31 ASSESSMENT — PAIN DESCRIPTION - PROGRESSION
CLINICAL_PROGRESSION: NOT CHANGED
CLINICAL_PROGRESSION: NOT CHANGED
CLINICAL_PROGRESSION: GRADUALLY WORSENING

## 2020-08-31 NOTE — PLAN OF CARE
Problem: Falls - Risk of:  Goal: Will remain free from falls  Description: Will remain free from falls  8/31/2020 1251 by Lexis Puentes RN  Outcome: Ongoing  Note: Gait steady with prosthesis on, calls for assist appropriately  8/31/2020 0018 by Deo Hatfield RN  Outcome: Ongoing     Problem: Pain:  Goal: Pain level will decrease  Description: Pain level will decrease  8/31/2020 1251 by Lexis Puentes RN  Outcome: Ongoing  Note: Pt reports adequate pain control with current meds  8/31/2020 0018 by Deo Hatfield RN  Outcome: Ongoing

## 2020-08-31 NOTE — PROGRESS NOTES
Occupational Therapy  DATE: 2020    NAME: Rafal Desai  MRN: [id number]   : 1955  [facility]  Occupational Therapy Not Seen Note    Patient not available for Occupational Therapy due to:    [] Testing:    [] Hemodialysis    [] Cancelled by RN:    [x]Refusal by Patient:Patient stated he will be leaving today,  But to come back in a few hours. Will check back as able. Attempted again at later time, patient just finished with PT, patient refused.     [] Surgery:     [] Intubation:     [] Pain Medication:    [] Sedation:     [] Spine Precautions :    [] Medical Instability:    [] Other:    Nina Lacrosse, CASTILLO/L

## 2020-08-31 NOTE — PROGRESS NOTES
[facility]  Progress Note    Reyes Ruggiero  MRN: [id number]  Date: 8/31/2020  LOS: 9     Reason for F/U :   Left TMA stump infection    Impression :   1. Recent left transmetatarsal amputation 8/5/2020  2. Trans-metatarsal stump infection with wound dehiscence and possible underlying osteomyelitis of the metatarsals  · Status post bedside wound debridement 8/22/2020  · Status post incision and drainage of the left foot with revision of transmetatarsal amputation of the left foot 8/24/2020  · Cultures with VRE and Proteus mirabilis  3. Peripheral arterial disease status post revascularization  4. 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    Recommendations:   · Continue oral ciprofloxacin and linezolid through 9/25/20  · The patient continues to await precertification for discharge to extended-care facility. · He has a lot of minor nonspecific complaints. · He will need weekly platelet monitoring while on linezolid    Infection Control Recommendations:   Universal precautions    Discharge Planning:   Patient will need Midline Catheter Insertion/ PICC line Insertion: No  Patient willneed outpatient wound care: Yes    Medical Decision making / Summary of Stay:   Reyes Ruggiero is a 60y.o.-year-old male who was initially admitted on 8/22/2020. Jorge Harmon has a history of peripheral arterial disease and has undergone revascularization procedures on the left in the past, esophageal cancer, diabetes mellitus with associated neuropathy and he was recently hospitalized with left foot plantar ulcerations of the great toe with associated gangrene and cellulitis of the foot. The patient subsequently underwent transmetatarsal amputation of the left foot with Achilles tendon lengthening on 8/5/2020. The patient was discharged to a rehabilitation facility on 8/7/2020 on oral antimicrobial therapy with Augmentin and doxycycline for a week.   The patient states that he never had any wound care done in the time since his discharge and he started to feel \"funny\" though he cannot really specify what this means exactly and ended up going back into the emergency room. The patient had dressing change done which showed devitalized tissue as well as an x-ray that showed some gaseous soft tissue changes and the patient was seen by the podiatry service had a debridement done at bedside. I was asked to evaluate if there was concern for osteomyelitis as the open wounds did have 3 metatarsal bones exposed. The patient was taken to the operating room 2020  The patient had all necrotic and fibrotic tissue debrided until bleeding tissue was noted with no purulent drainage and hard cortical bone. Current evaluation:2020    /67   Pulse 98   Temp 98.6 °F (37 °C)   Resp 18   Ht 6' (1.829 m)   Wt 160 lb 3.2 oz (72.7 kg)   SpO2 92%   BMI 21.73 kg/m²     Temperature Range: Temp: 98.6 °F (37 °C) Temp  Av.3 °F (36.8 °C)  Min: 97.5 °F (36.4 °C)  Max: 99 °F (37.2 °C)  The patient is seen and evaluated at bedside he is awake and alert in no acute distress. He reports just generally not feeling well today. He reports has not been eating very well that his appetite has been poor. No abdominal pain nausea vomiting or diarrhea. No fevers or chills. Review of Systems   Constitutional: Positive for appetite change and fatigue. HENT: Negative. Respiratory: Negative. Cardiovascular: Negative. Gastrointestinal: Negative. Genitourinary: Negative. Musculoskeletal: Negative. Neurological: Negative. Psychiatric/Behavioral: Negative. Physical Examination :     Physical Exam  Constitutional:       Appearance: He is well-developed. HENT:      Head: Normocephalic and atraumatic. Neck:      Musculoskeletal: Normal range of motion and neck supple. Cardiovascular:      Rate and Rhythm: Normal rate. Heart sounds: Normal heart sounds. No friction rub. No gallop. Pulmonary:      Effort: Pulmonary effort is normal.      Breath sounds: Normal breath sounds. No wheezing. Abdominal:      General: Bowel sounds are normal.      Palpations: Abdomen is soft. There is no mass. Tenderness: There is no abdominal tenderness. Musculoskeletal:      Comments: Right below the knee amputation   Lymphadenopathy:      Cervical: No cervical adenopathy. Skin:     General: Skin is warm and dry. Comments: There is a dressing in the left foot   Neurological:      Mental Status: He is alert and oriented to person, place, and time. Laboratory data:   I have independently reviewed the followinglabs:  CBC with Differential:   No results for input(s): WBC, HGB, HCT, PLT, SEGSPCT, BANDSPCT, LYMPHOPCT, MONOPCT, EOSPCT in the last 72 hours. BMP:   Recent Labs     08/30/20  0601   BUN 24*   CREATININE 1.13     Hepatic Function Panel: No results for input(s): PROT, LABALBU, BILIDIR, IBILI, BILITOT, ALKPHOS, ALT, AST in the last 72 hours. No results found for: PROCAL  Lab Results   Component Value Date    CRP 95.6 08/30/2020    CRP 51.5 08/28/2020    CRP 54.8 08/26/2020     Lab Results   Component Value Date    SEDRATE 74 (H) 08/22/2020       Lab Results   Component Value Date    DDIMER 0.39 06/29/2020    DDIMER 1.63 12/20/2017    DDIMER 0.68 12/25/2011    FERRITIN 64 01/25/2014       No results for input(s): VANCOTROUGH in the last 72 hours. Imaging Studies:   No new imaging    Cultures:     Culture, Anaerobic and Aerobic [[id number]]  (Abnormal)  Collected: 08/24/20 1923    Order Status: Completed  Specimen: Swab from Foot  Updated: 08/28/20 1603     Specimen Description  . FOOT     Special Requests  NOT REPORTED     Direct Exam  RARE NEUTROPHILSAbnormal        NO BACTERIA SEEN     Culture  PROTEUS MIRABILIS SCANT GROWTH For susceptibility, refer to previous culture. Abnormal        NO ANAEROBIC ORGANISMS ISOLATED AT 4 DAYSAbnormal       Culture, Anaerobic and Aerobic NOT REPORTED    Gentamicin, High Level   NOT REPORTED  Preliminary     levofloxacin    Preliminary      NOT REPORTED    linezolid  Sensitive   Preliminary      1   SUSCEPTIBLE    nitrofurantoin    Preliminary      NOT REPORTED    Synercid    Preliminary      NOT REPORTED    Streptomycin, Hi Level   NOT REPORTED  Preliminary     tetracycline    Preliminary      NOT REPORTED    tigecycline    Preliminary      NOT REPORTED    vancomycin  Resistant   Preliminary      >=32   RESISTANT      Culture, Anaerobic and Aerobic [[id number]]  (Abnormal)  Collected: 08/24/20 1900    Order Status: Completed  Specimen: Bone from Foot  Updated: 08/28/20 1600     Specimen Description  . FOOT     Special Requests  NOT REPORTED     Direct Exam  NO NEUTROPHILS SEEN      NO BACTERIA SEEN     Culture  PROTEUS MIRABILIS LIGHT GROWTH For susceptibility, refer to previous culture. Abnormal        GROUP D ENTEROCOCCUS SCANT GROWTH For susceptibility, refer to previous culture. Abnormal        NO ANAEROBIC ORGANISMS ISOLATED AT 4 DAYSAbnormal       Culture, Blood 1 [[id number]]   Collected: 08/22/20 1711    Order Status: Completed  Specimen: Blood  Updated: 08/28/20 4884     Specimen Description  . BLOOD     Special Requests  L HAND 4 ML     Culture  NO GROWTH 6 DAYS    Culture, Blood 1 [[id number]]   Collected: 08/22/20 1711    Order Status: Completed  Specimen: Blood  Updated: 08/28/20 0513     Specimen Description  . BLOOD     Special Requests  R AC 6 ML     Culture  NO GROWTH 6 DAYS    Culture, Anaerobic and Aerobic [[id number]]  (Abnormal)  Collected: 08/22/20 1830    Order Status: Completed  Specimen: Foot  Updated: 08/27/20 1541     Specimen Description  . FOOT     Special Requests  NOT REPORTED     Direct Exam  RARE NEUTROPHILSAbnormal        MANY GRAM NEGATIVE RODSAbnormal        FEW GRAM POSITIVE COCCI IN PAIRSAbnormal       Culture  NON LACTOSE FERMENTING GRAM NEGATIVE RODS MODERATE GROWTHAbnormal        PROTEUS SPECIES MODERATE
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
## 2020-08-31 NOTE — PLAN OF CARE
Siderails up x 2  Hourly rounding  Call light in reach  Instructed to call for assist before attempting out of bed. Remains free from falls and accidental injury at this time   Floor free from obstacles  Bed is locked and in lowest position  Adequate lighting provided  Bed alarm on, Red Falling star and Stay with Me signs posted    Pain level assessment complete. Patient educated on pain scale and control interventions  PRN pain medication given per patient request  Patient instructed to call out with new onset of pain or unrelieved pain  Checked for incontinence every 2 hours and prn. Pericare as needed. Assisted to reposition off back frequently. On waffle mattress. Heels off bed with pillows.

## 2020-08-31 NOTE — CARE COORDINATION
Social work; completed hens and faxed to [employer]. Completed transport forms for [employer] and faxed as tomorrow will call transports.   Kellie camacho

## 2020-08-31 NOTE — PROGRESS NOTES
(05/11/2015); Foot surgery (Right, 11/03/2016); Foot surgery (Right, 12/31/2016); Leg amputation below knee (Right, 01/21/2017); Colonoscopy (01/26/2017); fracture surgery (Left, 9/5/2015); vascular surgery (Right, 01/16/2017); Toe amputation (Left, 8/5/2020); and Toe amputation (Left, 8/24/2020). Restrictions  Restrictions/Precautions  Restrictions/Precautions: General Precautions, Fall Risk, Weight Bearing  Required Braces or Orthoses?: Yes(R BKA, R prothesis)  Lower Extremity Weight Bearing Restrictions  Left Lower Extremity Weight Bearing: Non Weight Bearing  Subjective   General  Chart Reviewed: Yes  Response To Previous Treatment: Patient with no complaints from previous session. Family / Caregiver Present: No  Subjective  Subjective: Pt states he is in alot of pain today and feels sick.   General Comment  Comments: Max encouragement to get pt to participate          Orientation  Orientation  Overall Orientation Status: Impaired  Orientation Level: Oriented to place;Oriented to person;Oriented to situation  Cognition   Cognition  Overall Cognitive Status: Exceptions  Arousal/Alertness: Appropriate responses to stimuli  Following Commands: Inconsistently follows commands  Attention Span: Attends with cues to redirect  Memory: Decreased recall of biographical Information;Decreased recall of recent events  Safety Judgement: Decreased awareness of need for assistance;Decreased awareness of need for safety  Problem Solving: Assistance required to identify errors made;Assistance required to generate solutions;Assistance required to implement solutions;Assistance required to correct errors made;Decreased awareness of errors  Insights: Decreased awareness of deficits  Initiation: Requires cues for some  Sequencing: Requires cues for some  Cognition Comment: Pt impulsive when asked to move, very frustrated this date with PT.  Objective   Bed mobility  Supine to Sit: Stand by assistance  Sit to Supine: Stand by fall risk precautions in place, Call light within reach, Nurse notified, Left in bed(pt refusing gait belt at this time)     Therapy Time   Individual Concurrent Group Co-treatment   Time In 1102         Time Out 1112         Minutes 10                 Vanessa Wei, PT

## 2020-09-01 LAB
BUN BLDV-MCNC: 21 MG/DL (ref 8–23)
C-REACTIVE PROTEIN: 176.7 MG/L (ref 0–5)
CREAT SERPL-MCNC: 0.92 MG/DL (ref 0.7–1.2)
GFR AFRICAN AMERICAN: >60 ML/MIN
GFR NON-AFRICAN AMERICAN: >60 ML/MIN
GFR SERPL CREATININE-BSD FRML MDRD: NORMAL ML/MIN/{1.73_M2}
GFR SERPL CREATININE-BSD FRML MDRD: NORMAL ML/MIN/{1.73_M2}
GLUCOSE BLD-MCNC: 103 MG/DL (ref 75–110)
GLUCOSE BLD-MCNC: 136 MG/DL (ref 75–110)
GLUCOSE BLD-MCNC: 160 MG/DL (ref 75–110)
GLUCOSE BLD-MCNC: 203 MG/DL (ref 75–110)
GLUCOSE BLD-MCNC: 90 MG/DL (ref 75–110)

## 2020-09-01 PROCEDURE — 6370000000 HC RX 637 (ALT 250 FOR IP): Performed by: NURSE PRACTITIONER

## 2020-09-01 PROCEDURE — 84520 ASSAY OF UREA NITROGEN: CPT

## 2020-09-01 PROCEDURE — 6370000000 HC RX 637 (ALT 250 FOR IP): Performed by: INTERNAL MEDICINE

## 2020-09-01 PROCEDURE — 82565 ASSAY OF CREATININE: CPT

## 2020-09-01 PROCEDURE — 86140 C-REACTIVE PROTEIN: CPT

## 2020-09-01 PROCEDURE — 6370000000 HC RX 637 (ALT 250 FOR IP): Performed by: FAMILY MEDICINE

## 2020-09-01 PROCEDURE — 6360000002 HC RX W HCPCS: Performed by: FAMILY MEDICINE

## 2020-09-01 PROCEDURE — 97110 THERAPEUTIC EXERCISES: CPT

## 2020-09-01 PROCEDURE — 36415 COLL VENOUS BLD VENIPUNCTURE: CPT

## 2020-09-01 PROCEDURE — 6370000000 HC RX 637 (ALT 250 FOR IP): Performed by: STUDENT IN AN ORGANIZED HEALTH CARE EDUCATION/TRAINING PROGRAM

## 2020-09-01 PROCEDURE — 94761 N-INVAS EAR/PLS OXIMETRY MLT: CPT

## 2020-09-01 PROCEDURE — 1200000000 HC SEMI PRIVATE

## 2020-09-01 PROCEDURE — 99239 HOSP IP/OBS DSCHRG MGMT >30: CPT | Performed by: INTERNAL MEDICINE

## 2020-09-01 PROCEDURE — 2580000003 HC RX 258: Performed by: STUDENT IN AN ORGANIZED HEALTH CARE EDUCATION/TRAINING PROGRAM

## 2020-09-01 PROCEDURE — 82947 ASSAY GLUCOSE BLOOD QUANT: CPT

## 2020-09-01 PROCEDURE — 94640 AIRWAY INHALATION TREATMENT: CPT

## 2020-09-01 RX ADMIN — FAMOTIDINE 20 MG: 20 TABLET ORAL at 08:12

## 2020-09-01 RX ADMIN — CIPROFLOXACIN 500 MG: 500 TABLET ORAL at 21:07

## 2020-09-01 RX ADMIN — CIPROFLOXACIN 500 MG: 500 TABLET ORAL at 08:12

## 2020-09-01 RX ADMIN — Medication 2 MG: at 21:07

## 2020-09-01 RX ADMIN — OXYCODONE HYDROCHLORIDE AND ACETAMINOPHEN 2 TABLET: 5; 325 TABLET ORAL at 07:04

## 2020-09-01 RX ADMIN — APIXABAN 5 MG: 5 TABLET, FILM COATED ORAL at 21:07

## 2020-09-01 RX ADMIN — ATORVASTATIN CALCIUM 40 MG: 40 TABLET, FILM COATED ORAL at 08:12

## 2020-09-01 RX ADMIN — INSULIN LISPRO 3 UNITS: 100 INJECTION, SOLUTION INTRAVENOUS; SUBCUTANEOUS at 21:08

## 2020-09-01 RX ADMIN — METFORMIN HYDROCHLORIDE 500 MG: 500 TABLET ORAL at 17:06

## 2020-09-01 RX ADMIN — DILTIAZEM HYDROCHLORIDE 60 MG: 60 TABLET, FILM COATED ORAL at 12:22

## 2020-09-01 RX ADMIN — LIDOCAINE 4%: 4 CREAM TOPICAL at 21:17

## 2020-09-01 RX ADMIN — LINEZOLID 600 MG: 600 TABLET, FILM COATED ORAL at 08:11

## 2020-09-01 RX ADMIN — DILTIAZEM HYDROCHLORIDE 60 MG: 60 TABLET, FILM COATED ORAL at 08:12

## 2020-09-01 RX ADMIN — DOCUSATE SODIUM 100 MG: 100 CAPSULE, LIQUID FILLED ORAL at 08:12

## 2020-09-01 RX ADMIN — NORTRIPTYLINE HYDROCHLORIDE 50 MG: 25 CAPSULE ORAL at 21:07

## 2020-09-01 RX ADMIN — FAMOTIDINE 20 MG: 20 TABLET ORAL at 21:07

## 2020-09-01 RX ADMIN — ALUMINUM HYDROXIDE, MAGNESIUM HYDROXIDE, AND SIMETHICONE 30 ML: 200; 200; 20 SUSPENSION ORAL at 21:17

## 2020-09-01 RX ADMIN — ALBUTEROL SULFATE 2 PUFF: 90 AEROSOL, METERED RESPIRATORY (INHALATION) at 21:38

## 2020-09-01 RX ADMIN — DILTIAZEM HYDROCHLORIDE 60 MG: 60 TABLET, FILM COATED ORAL at 21:07

## 2020-09-01 RX ADMIN — ASPIRIN 81 MG 81 MG: 81 TABLET ORAL at 08:12

## 2020-09-01 RX ADMIN — OXYCODONE HYDROCHLORIDE AND ACETAMINOPHEN 2 TABLET: 5; 325 TABLET ORAL at 12:23

## 2020-09-01 RX ADMIN — DILTIAZEM HYDROCHLORIDE 60 MG: 60 TABLET, FILM COATED ORAL at 17:06

## 2020-09-01 RX ADMIN — INSULIN GLARGINE 70 UNITS: 100 INJECTION, SOLUTION SUBCUTANEOUS at 21:09

## 2020-09-01 RX ADMIN — SODIUM CHLORIDE, PRESERVATIVE FREE 10 ML: 5 INJECTION INTRAVENOUS at 21:07

## 2020-09-01 RX ADMIN — APIXABAN 5 MG: 5 TABLET, FILM COATED ORAL at 08:12

## 2020-09-01 RX ADMIN — DOCUSATE SODIUM 100 MG: 100 CAPSULE, LIQUID FILLED ORAL at 21:07

## 2020-09-01 RX ADMIN — INSULIN LISPRO 3 UNITS: 100 INJECTION, SOLUTION INTRAVENOUS; SUBCUTANEOUS at 17:06

## 2020-09-01 RX ADMIN — LINEZOLID 600 MG: 600 TABLET, FILM COATED ORAL at 21:07

## 2020-09-01 RX ADMIN — METFORMIN HYDROCHLORIDE 500 MG: 500 TABLET ORAL at 08:12

## 2020-09-01 RX ADMIN — LIDOCAINE 4%: 4 CREAM TOPICAL at 03:03

## 2020-09-01 ASSESSMENT — PAIN DESCRIPTION - DESCRIPTORS
DESCRIPTORS: ACHING
DESCRIPTORS: ACHING

## 2020-09-01 ASSESSMENT — PAIN DESCRIPTION - ONSET: ONSET: ON-GOING

## 2020-09-01 ASSESSMENT — PAIN SCALES - GENERAL
PAINLEVEL_OUTOF10: 8

## 2020-09-01 ASSESSMENT — PAIN - FUNCTIONAL ASSESSMENT
PAIN_FUNCTIONAL_ASSESSMENT: ACTIVITIES ARE NOT PREVENTED
PAIN_FUNCTIONAL_ASSESSMENT: ACTIVITIES ARE NOT PREVENTED

## 2020-09-01 ASSESSMENT — PAIN DESCRIPTION - LOCATION
LOCATION: BACK;FOOT
LOCATION: BACK;FOOT

## 2020-09-01 ASSESSMENT — PAIN DESCRIPTION - ORIENTATION
ORIENTATION: LEFT
ORIENTATION: LEFT;LOWER

## 2020-09-01 ASSESSMENT — PAIN DESCRIPTION - PROGRESSION: CLINICAL_PROGRESSION: NOT CHANGED

## 2020-09-01 ASSESSMENT — PAIN DESCRIPTION - PAIN TYPE
TYPE: SURGICAL PAIN
TYPE: SURGICAL PAIN

## 2020-09-01 ASSESSMENT — PAIN DESCRIPTION - FREQUENCY: FREQUENCY: CONTINUOUS
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
## 2020-09-01 NOTE — PLAN OF CARE
Problem: Falls - Risk of:  Goal: Will remain free from falls  Description: Will remain free from falls  9/1/2020 0928 by Gerry Diamond RN  Outcome: Ongoing  Note: Fall safety precautions remain in place  9/1/2020 0050 by Kyle Michel RN  Outcome: Ongoing  Note: Siderails up x 2  Hourly rounding  Call light in reach  Instructed to call for assist before attempting out of bed. Remains free from falls and accidental injury at this time   Floor free from obstacles  Bed is locked and in lowest position  Adequate lighting provided  Bed alarm on, Red Falling star and Stay with Me signs posted     Problem: Pain:  Goal: Pain level will decrease  Description: Pain level will decrease  9/1/2020 0928 by Gerry Diamond RN  Outcome: Ongoing  Note: Pt reports adequate pain control with current meds  9/1/2020 0050 by Kyle Michel RN  Outcome: Ongoing  Note: Pain level assessment complete.    Patient educated on pain scale and control interventions  PRN pain medication given per patient request-Percocet and Morphine  Patient instructed to call out with new onset of pain or unrelieved pain

## 2020-09-01 NOTE — DISCHARGE SUMMARY
male who was admitted for the management of   Diabetic foot infection [facility]) , presented to ER with Foot Pain and Post-op Problem    Khalida Quinn is 60 y.o. male  Who was admitted to the hospital on 8/22/2020 for treatment of Diabetic foot infection ([address] Patient had recent transmetatarsal amputation of left foot and was discharged to skilled nursing facility on antibiotics. Patient presented with increasing pain and swelling for several weeks. Patient has history of type 2 diabetes mellitus with neuropathy, esophageal cancer, GERD, thromboembolism in 2017, hyperlipidemia, current smoker. Wound culture showed non-lactose limiting gram-negative rods. Physical exam     Alert  Chest: Clear to auscultation bilateral  Abdomen: Nontender nondistended  CVS: S1-S2  Neurology: Moves extremity    Transmetatarsal stump infection left foot status post revision of TMA treated with IV antibiotics ID was consulted wound culture is growing Proteus mirabilis and enterococcus antibiotics switched to Cipro and Zyvox for 28 days patient will be discharged to rehab    Wound dehiscence left foot status post I&D follow-up with podiatrist as outpatient Recommending Adaptic around surgical incision site, 4 x 4, Kerlix, Ace) change dressing daily. Follow Dr Roxie Shrestha as outpatient.      Left foot osteomyelitis continue antibiotics      Diabetes mellitus type 2 with peripheral vascular disease continue current treatment      COPD stable continue current treatment    Peripheral vascular disease status post BKA monitor      History of esophageal cancer follow-up with PCP        Significant therapeutic interventions:     Significant Diagnostic Studies:   Labs / Micro:  CBC:   Lab Results   Component Value Date    WBC 10.0 08/26/2020    RBC 3.35 08/26/2020    RBC 4.32 05/02/2012    HGB 9.7 08/26/2020    HCT 32.0 08/26/2020    MCV 95.5 08/26/2020    MCH 29.0 08/26/2020    MCHC 30.3 08/26/2020    RDW 12.9 08/26/2020     08/26/2020     05/02/2012     BMP:    Lab Results   Component Value Date    GLUCOSE 90 08/26/2020    GLUCOSE 129 05/02/2012     08/26/2020    K 4.3 08/26/2020     08/26/2020    CO2 29 08/26/2020    ANIONGAP 8 08/26/2020    BUN 21 09/01/2020    CREATININE 0.92 09/01/2020    BUNCRER 19 08/26/2020    CALCIUM 8.7 08/26/2020    LABGLOM >60 09/01/2020    GFRAA >60 09/01/2020    GFR      09/01/2020    GFR NOT REPORTED 09/01/2020     HFP:    Lab Results   Component Value Date    PROT 6.7 03/12/2020     CMP:    Lab Results   Component Value Date    GLUCOSE 90 08/26/2020    GLUCOSE 129 05/02/2012     08/26/2020    K 4.3 08/26/2020     08/26/2020    CO2 29 08/26/2020    BUN 21 09/01/2020    CREATININE 0.92 09/01/2020    ANIONGAP 8 08/26/2020    ALKPHOS 127 03/12/2020    ALT 14 03/12/2020    AST 12 03/12/2020    BILITOT <0.10 03/12/2020    LABALBU 3.4 03/12/2020    LABALBU 4.4 03/05/2012    ALBUMIN NOT REPORTED 03/12/2020    LABGLOM >60 09/01/2020    GFRAA >60 09/01/2020    GFR      09/01/2020    GFR NOT REPORTED 09/01/2020    PROT 6.7 03/12/2020    CALCIUM 8.7 08/26/2020     PT/INR:    Lab Results   Component Value Date    PROTIME 13.1 07/29/2020    PROTIME 10.5 05/02/2012    INR 1.0 07/29/2020     PTT:   Lab Results   Component Value Date    APTT 27.3 06/23/2018     FLP:    Lab Results   Component Value Date    CHOL 174 06/24/2018    TRIG 175 06/24/2018    HDL 49 06/24/2018     U/A:    Lab Results   Component Value Date    COLORU YELLOW 07/09/2018    TURBIDITY CLEAR 07/09/2018    SPECGRAV 1.010 07/09/2018    HGBUR NEGATIVE 07/09/2018    PHUR 5.0 07/09/2018    PROTEINU 1+ 07/09/2018    GLUCOSEU NEGATIVE 07/09/2018    GLUCOSEU TRACE 03/05/2012    KETUA NEGATIVE 07/09/2018    BILIRUBINUR NEGATIVE 07/09/2018    BILIRUBINUR small 07/18/2016    BILIRUBINUR NEGATIVE 03/05/2012    UROBILINOGEN Normal 07/09/2018    NITRU NEGATIVE 07/09/2018    LEUKOCYTESUR NEGATIVE 07/09/2018     TSH:    Lab Results packet  Commonly known as:  GLYCOLAX  Take 17 g by mouth 2 times daily        CONTINUE taking these medications    apixaban 5 MG Tabs tablet  Commonly known as:  Eliquis  Take 1 tablet by mouth 2 times daily     aspirin 81 MG chewable tablet  Take 1 tablet by mouth daily     atorvastatin 40 MG tablet  Commonly known as:  LIPITOR  Take 1 tablet by mouth daily     blood glucose test strips strip  Commonly known as:  ASCENSIA AUTODISC VI;ONE TOUCH ULTRA TEST VI  Use with associated glucose meter to check fluctuating blood sugars BID     dilTIAZem 60 MG tablet  Commonly known as:  CARDIZEM     famotidine 20 MG tablet  Commonly known as:  PEPCID  Take 1 tablet by mouth 2 times daily     glucose monitoring kit monitoring kit  1 kit by Does not apply route daily     * insulin lispro 100 UNIT/ML injection vial  Commonly known as:  HUMALOG  Inject 0-18 Units into the skin 3 times daily (with meals)     * insulin lispro 100 UNIT/ML injection vial  Commonly known as:  HUMALOG  Inject 0-9 Units into the skin nightly     Insulin Pen Needle 32G X 4 MM Misc  1 each by Does not apply route daily     ipratropium-albuterol 0.5-2.5 (3) MG/3ML Soln nebulizer solution  Commonly known as:  DUONEB  Inhale 3 mLs into the lungs every 4 hours (while awake)     metFORMIN 500 MG tablet  Commonly known as:  GLUCOPHAGE  Take 1 tablet by mouth 2 times daily (with meals)     nortriptyline 25 MG capsule  Commonly known as:  PAMELOR     Tresiba FlexTouch 100 UNIT/ML Sopn  Generic drug:  Insulin Degludec     * TRUEplus Lancets 30G Misc  Inject 1 each into the skin 3 times daily TO CHECK FLUCTUATING BLOOD SUGARS IE HYPERGLYCEMIA     * Lancets Misc  1 each by Does not apply route 2 times daily     Ventolin  (90 Base) MCG/ACT inhaler  Generic drug:  albuterol sulfate HFA         * This list has 4 medication(s) that are the same as other medications prescribed for you.  Read the directions carefully, and ask your doctor or other care provider to review them with you. Where to Get Your Medications      You can get these medications from any pharmacy    Bring a paper prescription for each of these medications  · naloxone 4 MG/0.1ML Liqd nasal spray  · oxyCODONE-acetaminophen 5-325 MG per tablet     Information about where to get these medications is not yet available    Ask your nurse or doctor about these medications  · ciprofloxacin 500 MG tablet  · docusate 100 MG Caps  · linezolid 600 MG tablet  · nicotine 21 MG/24HR  · polyethylene glycol 17 g packet         No discharge procedures on file. Time Spent on discharge is  35 mins in patient examination, evaluation, counseling as well as medication reconciliation, prescriptions for required medications, discharge plan and follow up. Electronically signed by   Ivette Lujan MD  9/1/2020  12:59 PM      Thank you Dr. Anyi Cotter DO for the opportunity to be involved in this patient's care.

## 2020-09-01 NOTE — CARE COORDINATION
Social Janina Marcus denied patient. Dr Livier Goodman will complete a peer to peer before 10 AM tomorrow.  Marianna Noguera

## 2020-09-01 NOTE — PROGRESS NOTES
Occupational Therapy  DATE: 2020    NAME: Dallin Stewart  MRN: [id number]   : 1955  [facility]  Occupational Therapy Not Seen Note    Patient not available for Occupational Therapy due to:    [] Testing:    [] Hemodialysis    [] Cancelled by RN:    [x]Refusal by Patient: Patient reports having an upset stomach.  Will check back as able    [] Surgery:     [] Intubation:     [] Pain Medication:    [] Sedation:     [] Spine Precautions :    [] Medical Instability:    [] Other:    ARABELLA Tobar

## 2020-09-02 ENCOUNTER — TELEPHONE (OUTPATIENT)
Dept: PODIATRY | Age: 65
End: 2020-09-02

## 2020-09-02 LAB
GLUCOSE BLD-MCNC: 158 MG/DL (ref 75–110)
GLUCOSE BLD-MCNC: 172 MG/DL (ref 75–110)
GLUCOSE BLD-MCNC: 189 MG/DL (ref 75–110)
GLUCOSE BLD-MCNC: 94 MG/DL (ref 75–110)

## 2020-09-02 PROCEDURE — 97530 THERAPEUTIC ACTIVITIES: CPT

## 2020-09-02 PROCEDURE — 99239 HOSP IP/OBS DSCHRG MGMT >30: CPT | Performed by: INTERNAL MEDICINE

## 2020-09-02 PROCEDURE — 6370000000 HC RX 637 (ALT 250 FOR IP): Performed by: FAMILY MEDICINE

## 2020-09-02 PROCEDURE — 6370000000 HC RX 637 (ALT 250 FOR IP): Performed by: NURSE PRACTITIONER

## 2020-09-02 PROCEDURE — 2580000003 HC RX 258: Performed by: STUDENT IN AN ORGANIZED HEALTH CARE EDUCATION/TRAINING PROGRAM

## 2020-09-02 PROCEDURE — 1200000000 HC SEMI PRIVATE

## 2020-09-02 PROCEDURE — 82947 ASSAY GLUCOSE BLOOD QUANT: CPT

## 2020-09-02 PROCEDURE — 6370000000 HC RX 637 (ALT 250 FOR IP): Performed by: STUDENT IN AN ORGANIZED HEALTH CARE EDUCATION/TRAINING PROGRAM

## 2020-09-02 PROCEDURE — 6370000000 HC RX 637 (ALT 250 FOR IP): Performed by: INTERNAL MEDICINE

## 2020-09-02 PROCEDURE — 6360000002 HC RX W HCPCS: Performed by: FAMILY MEDICINE

## 2020-09-02 RX ORDER — BLOOD-GLUCOSE METER
1 KIT MISCELLANEOUS DAILY
Qty: 1 KIT | Refills: 0 | Status: ON HOLD | OUTPATIENT
Start: 2020-09-02 | End: 2021-01-11 | Stop reason: HOSPADM

## 2020-09-02 RX ADMIN — INSULIN LISPRO 3 UNITS: 100 INJECTION, SOLUTION INTRAVENOUS; SUBCUTANEOUS at 17:27

## 2020-09-02 RX ADMIN — APIXABAN 5 MG: 5 TABLET, FILM COATED ORAL at 19:49

## 2020-09-02 RX ADMIN — INSULIN LISPRO 3 UNITS: 100 INJECTION, SOLUTION INTRAVENOUS; SUBCUTANEOUS at 09:38

## 2020-09-02 RX ADMIN — OXYCODONE HYDROCHLORIDE AND ACETAMINOPHEN 2 TABLET: 5; 325 TABLET ORAL at 19:49

## 2020-09-02 RX ADMIN — DILTIAZEM HYDROCHLORIDE 60 MG: 60 TABLET, FILM COATED ORAL at 17:27

## 2020-09-02 RX ADMIN — DOCUSATE SODIUM 100 MG: 100 CAPSULE, LIQUID FILLED ORAL at 19:49

## 2020-09-02 RX ADMIN — LINEZOLID 600 MG: 600 TABLET, FILM COATED ORAL at 09:38

## 2020-09-02 RX ADMIN — INSULIN LISPRO 2 UNITS: 100 INJECTION, SOLUTION INTRAVENOUS; SUBCUTANEOUS at 21:21

## 2020-09-02 RX ADMIN — POLYETHYLENE GLYCOL (3350) 17 G: 17 POWDER, FOR SOLUTION ORAL at 21:22

## 2020-09-02 RX ADMIN — FAMOTIDINE 20 MG: 20 TABLET ORAL at 19:48

## 2020-09-02 RX ADMIN — DILTIAZEM HYDROCHLORIDE 60 MG: 60 TABLET, FILM COATED ORAL at 09:38

## 2020-09-02 RX ADMIN — METFORMIN HYDROCHLORIDE 500 MG: 500 TABLET ORAL at 17:27

## 2020-09-02 RX ADMIN — SODIUM CHLORIDE, PRESERVATIVE FREE 10 ML: 5 INJECTION INTRAVENOUS at 23:00

## 2020-09-02 RX ADMIN — DILTIAZEM HYDROCHLORIDE 60 MG: 60 TABLET, FILM COATED ORAL at 12:55

## 2020-09-02 RX ADMIN — ALUMINUM HYDROXIDE, MAGNESIUM HYDROXIDE, AND SIMETHICONE 30 ML: 200; 200; 20 SUSPENSION ORAL at 22:59

## 2020-09-02 RX ADMIN — CIPROFLOXACIN 500 MG: 500 TABLET ORAL at 19:48

## 2020-09-02 RX ADMIN — OXYCODONE HYDROCHLORIDE AND ACETAMINOPHEN 2 TABLET: 5; 325 TABLET ORAL at 03:45

## 2020-09-02 RX ADMIN — SODIUM CHLORIDE, PRESERVATIVE FREE 10 ML: 5 INJECTION INTRAVENOUS at 09:39

## 2020-09-02 RX ADMIN — ASPIRIN 81 MG 81 MG: 81 TABLET ORAL at 09:38

## 2020-09-02 RX ADMIN — OXYCODONE HYDROCHLORIDE AND ACETAMINOPHEN 2 TABLET: 5; 325 TABLET ORAL at 14:35

## 2020-09-02 RX ADMIN — DOCUSATE SODIUM 100 MG: 100 CAPSULE, LIQUID FILLED ORAL at 09:38

## 2020-09-02 RX ADMIN — FAMOTIDINE 20 MG: 20 TABLET ORAL at 09:38

## 2020-09-02 RX ADMIN — POLYETHYLENE GLYCOL (3350) 17 G: 17 POWDER, FOR SOLUTION ORAL at 19:48

## 2020-09-02 RX ADMIN — ATORVASTATIN CALCIUM 40 MG: 40 TABLET, FILM COATED ORAL at 09:38

## 2020-09-02 RX ADMIN — POLYETHYLENE GLYCOL (3350) 17 G: 17 POWDER, FOR SOLUTION ORAL at 09:38

## 2020-09-02 RX ADMIN — Medication 2 MG: at 21:15

## 2020-09-02 RX ADMIN — APIXABAN 5 MG: 5 TABLET, FILM COATED ORAL at 09:38

## 2020-09-02 RX ADMIN — CIPROFLOXACIN 500 MG: 500 TABLET ORAL at 09:38

## 2020-09-02 RX ADMIN — DILTIAZEM HYDROCHLORIDE 60 MG: 60 TABLET, FILM COATED ORAL at 19:49

## 2020-09-02 RX ADMIN — INSULIN GLARGINE 70 UNITS: 100 INJECTION, SOLUTION SUBCUTANEOUS at 21:22

## 2020-09-02 RX ADMIN — METFORMIN HYDROCHLORIDE 500 MG: 500 TABLET ORAL at 09:38

## 2020-09-02 RX ADMIN — LINEZOLID 600 MG: 600 TABLET, FILM COATED ORAL at 19:49

## 2020-09-02 RX ADMIN — NORTRIPTYLINE HYDROCHLORIDE 50 MG: 25 CAPSULE ORAL at 19:48

## 2020-09-02 ASSESSMENT — PAIN DESCRIPTION - LOCATION
LOCATION: GENERALIZED
LOCATION: GENERALIZED
LOCATION: FOOT;BACK
LOCATION: GENERALIZED

## 2020-09-02 ASSESSMENT — PAIN SCALES - GENERAL
PAINLEVEL_OUTOF10: 7
PAINLEVEL_OUTOF10: 10
PAINLEVEL_OUTOF10: 6
PAINLEVEL_OUTOF10: 8

## 2020-09-02 ASSESSMENT — PAIN DESCRIPTION - DESCRIPTORS
DESCRIPTORS: ACHING;DISCOMFORT
DESCRIPTORS: ACHING;DISCOMFORT
DESCRIPTORS: ACHING
DESCRIPTORS: ACHING;DISCOMFORT

## 2020-09-02 ASSESSMENT — PAIN DESCRIPTION - ONSET
ONSET: ON-GOING

## 2020-09-02 ASSESSMENT — PAIN DESCRIPTION - FREQUENCY
FREQUENCY: CONTINUOUS

## 2020-09-02 ASSESSMENT — PAIN DESCRIPTION - ORIENTATION
ORIENTATION: LEFT;LOWER

## 2020-09-02 ASSESSMENT — PAIN DESCRIPTION - PROGRESSION
CLINICAL_PROGRESSION: NOT CHANGED

## 2020-09-02 ASSESSMENT — PAIN DESCRIPTION - PAIN TYPE
TYPE: CHRONIC PAIN
TYPE: CHRONIC PAIN
TYPE: SURGICAL PAIN;CHRONIC PAIN
TYPE: CHRONIC PAIN

## 2020-09-02 NOTE — TELEPHONE ENCOUNTER
Pt is currently admitted at [facility] at this time. He will call when he is discharged to make an appointment. Gave pt our phone number. Will also mail him a letter.
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
## 2020-09-02 NOTE — LETTER
[facility]  [address] [facility] [address] [address]  Phone: [phone]  Fax: [address] [facility] [address], D[address]        September 2, 2020    [facility] [place] [address]      Dear Emmett Christianson:    Please give our office a call when you are discharged to make a new patient post op follow up appointment. If you have any questions or concerns, please don't hesitate to call.     Sincerely,        Reny Montalvo DPM

## 2020-09-02 NOTE — TELEPHONE ENCOUNTER
----- Message from Yennifer Prado DPM sent at 9/2/2020  1:10 PM EDT -----  Regarding: schedule for appointment  Please schedule for appointment for next Wednesday.  Coming from [facility] [phone]  Or his phone number Mobile: [phone] (Preferred)   Home Phone: [phone]

## 2020-09-02 NOTE — CARE COORDINATION
Social Noah Reyes again denied patient for admission  To [facility]. Met with patient to give him the number to appeal. He stated that he is going home tomorrow. He would like to go home with [facility].

## 2020-09-02 NOTE — TELEPHONE ENCOUNTER
Patient was scheduled with Dr Mati Ritchie on 9/9 1 week follow up Carmen Lawson discharge. The nurse from [facility] stated that the patient is supposed to see Dr Alex Mae but I was unable to figure out how to get her schedule up.

## 2020-09-02 NOTE — PLAN OF CARE
Problem: Falls - Risk of:  Goal: Will remain free from falls  Description: Will remain free from falls  Outcome: Ongoing  Note: Siderails up x 2  Hourly rounding  Call light in reach  Remains free from falls and accidental injury at this time   Floor free from obstacles  Bed is locked and in lowest position  Adequate lighting provided  Patient refused bed alarm to be placed on bed. Sheet signed and in chart. Goal: Absence of physical injury  Description: Absence of physical injury  Outcome: Ongoing     Problem: Skin Integrity:  Goal: Will show no infection signs and symptoms  Description: Will show no infection signs and symptoms  Outcome: Ongoing  Goal: Absence of new skin breakdown  Description: Absence of new skin breakdown  Outcome: Ongoing  Goal: Risk for impaired skin integrity will decrease  Description: Risk for impaired skin integrity will decrease  Outcome: Ongoing     Problem: Pain:  Goal: Pain level will decrease  Description: Pain level will decrease  Outcome: Ongoing  Note: Pain level assessment complete. Patient educated on pain scale and control interventions  PRN pain medication given per patient request-Percocet and Morphine  Patient instructed to call out with new onset of pain or unrelieved pain    Goal: Control of acute pain  Description: Control of acute pain  Outcome: Ongoing  Goal: Control of chronic pain  Description: Control of chronic pain  Outcome: Ongoing     Problem: Tobacco Use:  Goal: Inpatient tobacco use cessation counseling participation  Description: Inpatient tobacco use cessation counseling participation  Outcome: Ongoing     Problem:  Activity:  Goal: Risk for activity intolerance will decrease  Description: Risk for activity intolerance will decrease  Outcome: Ongoing     Problem: Coping:  Goal: Ability to adjust to condition or change in health will improve  Description: Ability to adjust to condition or change in health will improve  Outcome: Ongoing     Problem: Fluid

## 2020-09-02 NOTE — CARE COORDINATION
Social Work-Dr Keesha Rowland completed peer to peer. [facility] will need updated therapy notes from today faxed to them by noon.  Discussed with Tesfaye Johnson

## 2020-09-02 NOTE — PLAN OF CARE
Problem: Falls - Risk of:  Goal: Will remain free from falls  Description: Will remain free from falls  9/2/2020 1442 by Celestina Coffey RN  Outcome: Ongoing     Problem: Falls - Risk of:  Goal: Absence of physical injury  Description: Absence of physical injury  9/2/2020 1442 by Celestina Coffey RN  Outcome: Ongoing     Problem: Skin Integrity:  Goal: Will show no infection signs and symptoms  Description: Will show no infection signs and symptoms  9/2/2020 1442 by Celestina Coffey RN  Outcome: Ongoing     Problem: Skin Integrity:  Goal: Absence of new skin breakdown  Description: Absence of new skin breakdown  9/2/2020 1442 by Celestina Coffey RN  Outcome: Ongoing     Problem: Skin Integrity:  Goal: Risk for impaired skin integrity will decrease  Description: Risk for impaired skin integrity will decrease  9/2/2020 1442 by Celestina Coffey RN  Outcome: Ongoing     Problem: Pain:  Goal: Pain level will decrease  Description: Pain level will decrease  9/2/2020 1442 by Celestina Coffey RN  Outcome: Ongoing     Problem: Pain:  Goal: Control of acute pain  Description: Control of acute pain  9/2/2020 1442 by Celestina Coffey RN  Outcome: Ongoing     Problem: Pain:  Goal: Control of chronic pain  Description: Control of chronic pain  9/2/2020 1442 by Celestina Coffey RN  Outcome: Ongoing     Problem: Tobacco Use:  Goal: Inpatient tobacco use cessation counseling participation  Description: Inpatient tobacco use cessation counseling participation  9/2/2020 1442 by Celestina Coffey RN  Outcome: Ongoing     Problem:  Activity:  Goal: Risk for activity intolerance will decrease  Description: Risk for activity intolerance will decrease  9/2/2020 1442 by Celestina Coffey RN  Outcome: Ongoing     Problem: Coping:  Goal: Ability to adjust to condition or change in health will improve  Description: Ability to adjust to condition or change in health will improve  9/2/2020 1442 by Carlota Morales RN  Outcome: Ongoing     Problem: Fluid Volume:  Goal: Ability to maintain a balanced intake and output will improve  Description: Ability to maintain a balanced intake and output will improve  9/2/2020 1442 by Carlota Morales RN  Outcome: Ongoing     Problem: Health Behavior:  Goal: Ability to identify and utilize available resources and services will improve  Description: Ability to identify and utilize available resources and services will improve  9/2/2020 1442 by Carlota Morales RN  Outcome: Ongoing     Problem: Health Behavior:  Goal: Ability to manage health-related needs will improve  Description: Ability to manage health-related needs will improve  9/2/2020 1442 by Carlota Morales RN  Outcome: Ongoing     Problem: Metabolic:  Goal: Ability to maintain appropriate glucose levels will improve  Description: Ability to maintain appropriate glucose levels will improve  9/2/2020 1442 by Carlota Morales RN  Outcome: Ongoing     Problem: Nutritional:  Goal: Maintenance of adequate nutrition will improve  Description: Maintenance of adequate nutrition will improve  9/2/2020 1442 by Carlota Morales RN  Outcome: Ongoing     Problem: Nutritional:  Goal: Progress toward achieving an optimal weight will improve  Description: Progress toward achieving an optimal weight will improve  9/2/2020 1442 by Carlota Morales RN  Outcome: Ongoing     Problem: Physical Regulation:  Goal: Complications related to the disease process, condition or treatment will be avoided or minimized  Description: Complications related to the disease process, condition or treatment will be avoided or minimized  9/2/2020 1442 by Carlota Morales RN  Outcome: Ongoing     Problem: Physical Regulation:  Goal: Diagnostic test results will improve  Description: Diagnostic test results will improve  9/2/2020 1442 by Carlota Morales RN  Outcome:

## 2020-09-02 NOTE — CARE COORDINATION
Social Work-Sherrill Madera. They have not received a notification from [facility].  [facility]

## 2020-09-02 NOTE — CARE COORDINATION
Spoke to patient and discussed [facility]. when discharged tomorrow with Yovana. Chiquita from [facility] informed. Pt requesting need for walker and ordered per Dr. Jenni Amaya. Spoke to Saima De La Rosa from [employer] and face sheet, script and face to face note faxed. Will be delivered to patients room in AM.  Post op appointment scheduled with Dr. Felisa Stahl 9-9-20 at 1:15pm and pt informed.

## 2020-09-02 NOTE — PROGRESS NOTES
Perfect serve message sent to Dr. Amna Servin regarding walker, glucometer, and supplies scripts. Perfect serve messages read, waiting for response.

## 2020-09-02 NOTE — DISCHARGE SUMMARY
[facility]  Office: [address], DO, Mary Centeno, DO, Sachin Renee, DO, Namrata Spire Blood, DO, Mike Cabral MD, Archana Ashley MD, Keny Wu MD, Shaina Diallo MD, Farhan Frederick MD, Daniel Loomis MD, Cecile Lee MD, Frank Valdivia MD, Farzaneh Trinh MD, Nabila Trejo DO, Donna Morales MD, Isabel Dick MD, Velma Sidhu, DO, Lazaro Mesa MD,  Virginia Rota, DO, Misael Hooks MD, Ekta De La Vega MD, Olu Smith, [facility], [facility], CNP, Morales Gonzalez, CNP, Hugo Jack, CNS, Lindsay Brown, CNP, Lisa Rojo, CNP, Margarito Ochoa, CNP, Heddy Sacks, CNP, Leticia De Oliveira, CNP, Jazmine Soni PA-C, Yomaira Peñaloza DNP, Deandre Wallace, CNP, Leena Olivo, CNP, Gonzales Luis, CNP, Manfred Powell, CNP, Madphong Comfort, [facility]    Discharge Summary     Patient ID: Lupe Barlow  :  1955   MRN: [id number]     ACCOUNT:  [de-identified]   Patient's PCP: Dana Mackey DO  Admit Date: 2020   Discharge Date: 9/3/2020     Length of Stay: 11  Code Status:  Full Code  Admitting Physician: Martha Hanson DO  Discharge Physician: Donna Morales MD     Active Discharge Diagnoses:     Hospital Problem Lists:  Principal Problem:    Diabetic foot infection [facility])  Active Problems:    Type 2 diabetes mellitus with diabetic polyneuropathy, with long-term current use of insulin ([facility] [address]    Diabetic polyneuropathy ([facility] [address]    Tobacco dependence    Edentulous    COPD without exacerbation ([facility] [address]    Dyslipidemia    PVD (peripheral vascular disease) ([facility] [address]    Below-knee amputation of right lower extremity ([facility] [address]    Essential hypertension    Uncontrolled type 2 diabetes mellitus with hyperglycemia ([facility] [address]    Noncompliance  Resolved Problems:    * No resolved hospital problems. *      Admission Condition:  poor     Discharged Condition: stable    Hospital Stay:     Hospital Course:  Lupe Barlow is a 60 y.o. male who was admitted for the management of   Diabetic foot infection [facility]) , presented to ER with Foot Pain and Post-op Problem    Jacob Nair is 60 y.o. male  Who was admitted to the hospital on 8/22/2020 for treatment of Diabetic foot infection ([facility] [address] Patient had recent transmetatarsal amputation of left foot and was discharged to skilled nursing facility on antibiotics. Patient presented with increasing pain and swelling for several weeks. Patient has history of type 2 diabetes mellitus with neuropathy, esophageal cancer, GERD, thromboembolism in 2017, hyperlipidemia, current smoker. Wound culture showed non-lactose limiting gram-negative rods. Physical exam     Alert  Chest: Clear to auscultation bilateral  Abdomen: Nontender nondistended  CVS: S1-S2  Neurology: Moves extremity    Transmetatarsal stump infection left foot status post revision of TMA treated with IV antibiotics ID was consulted wound culture is growing Proteus mirabilis and enterococcus antibiotics switched to Cipro and Zyvox for 28 days patient will be discharged to rehab    Wound dehiscence left foot status post I&D follow-up with podiatrist as outpatient Recommending Adaptic around surgical incision site, 4 x 4, Kerlix, Ace) change dressing daily. Follow Dr Wilbert Huizar as outpatient.      Left foot osteomyelitis continue antibiotics      Diabetes mellitus type 2 with peripheral vascular disease continue current treatment      COPD stable continue current treatment    Peripheral vascular disease status post BKA monitor      History of esophageal cancer follow-up with PCP    Pending placement discussed with the nurse  and the patient    Significant therapeutic interventions:     Significant Diagnostic Studies:   Labs / Micro:  CBC:   Lab Results   Component Value Date    WBC 10.0 08/26/2020    RBC 3.35 08/26/2020    RBC 4.32 05/02/2012    HGB 9.7 08/26/2020    HCT 32.0 08/26/2020    MCV 95.5 08/26/2020    MCH 29.0 08/26/2020    MCHC 30.3 08/26/2020    RDW 12.9 08/26/2020     08/26/2020     05/02/2012     BMP:    Lab Results   Component Value Date    GLUCOSE 90 08/26/2020    GLUCOSE 129 05/02/2012     08/26/2020    K 4.3 08/26/2020     08/26/2020    CO2 29 08/26/2020    ANIONGAP 8 08/26/2020    BUN 21 09/01/2020    CREATININE 0.92 09/01/2020    BUNCRER 19 08/26/2020    CALCIUM 8.7 08/26/2020    LABGLOM >60 09/01/2020    GFRAA >60 09/01/2020    GFR      09/01/2020    GFR NOT REPORTED 09/01/2020     HFP:    Lab Results   Component Value Date    PROT 6.7 03/12/2020     CMP:    Lab Results   Component Value Date    GLUCOSE 90 08/26/2020    GLUCOSE 129 05/02/2012     08/26/2020    K 4.3 08/26/2020     08/26/2020    CO2 29 08/26/2020    BUN 21 09/01/2020    CREATININE 0.92 09/01/2020    ANIONGAP 8 08/26/2020    ALKPHOS 127 03/12/2020    ALT 14 03/12/2020    AST 12 03/12/2020    BILITOT <0.10 03/12/2020    LABALBU 3.4 03/12/2020    LABALBU 4.4 03/05/2012    ALBUMIN NOT REPORTED 03/12/2020    LABGLOM >60 09/01/2020    GFRAA >60 09/01/2020    GFR      09/01/2020    GFR NOT REPORTED 09/01/2020    PROT 6.7 03/12/2020    CALCIUM 8.7 08/26/2020     PT/INR:    Lab Results   Component Value Date    PROTIME 13.1 07/29/2020    PROTIME 10.5 05/02/2012    INR 1.0 07/29/2020     PTT:   Lab Results   Component Value Date    APTT 27.3 06/23/2018     FLP:    Lab Results   Component Value Date    CHOL 174 06/24/2018    TRIG 175 06/24/2018    HDL 49 06/24/2018     U/A:    Lab Results   Component Value Date    COLORU YELLOW 07/09/2018    TURBIDITY CLEAR 07/09/2018    SPECGRAV 1.010 07/09/2018    HGBUR NEGATIVE 07/09/2018    PHUR 5.0 07/09/2018    PROTEINU 1+ 07/09/2018    GLUCOSEU NEGATIVE 07/09/2018    GLUCOSEU TRACE 03/05/2012    KETUA NEGATIVE 07/09/2018    BILIRUBINUR NEGATIVE 07/09/2018    BILIRUBINUR small 07/18/2016    BILIRUBINUR NEGATIVE 03/05/2012    UROBILINOGEN Normal 07/09/2018    NITRU NEGATIVE 07/09/2018    LEUKOCYTESUR NEGATIVE 07/09/2018     TSH:    Lab Results   Component Value Date    TSH 0.93 06/24/2018        Radiology:  No results found. Consultations:    Consults:     Final Specialist Recommendations/Findings:   IP CONSULT TO INTERNAL MEDICINE  IP CONSULT TO INFECTIOUS DISEASES  IP CONSULT TO PODIATRY      The patient was seen and examined on day of discharge and this discharge summary is in conjunction with any daily progress note from day of discharge.     Discharge plan:     Disposition: rehab    Physician Follow Up:     DO Rasheeda Zaragoza [address] [place] [address] 2205 [facility], S.[facility] 91  601 [facility] [address]  509 [address]. [address]  1859 [facility] [address] [place] [address]  [phone]  Schedule an appointment as soon as possible for a visit in 1 week  call for archana Aviles MD  [address] [place] [address], [facility] 372  Barbara [address] [facility]  [phone]    In 4 weeks         Requiring Further Evaluation/Follow Up POST HOSPITALIZATION/Incidental Findings:     Diet: cardiac diet and diabetic diet    Activity: As tolerated    Instructions to Patient:     Discharge Medications:      Medication List      START taking these medications    ciprofloxacin 500 MG tablet  Commonly known as:  CIPRO  Take 1 tablet by mouth every 12 hours for 25 days     docusate 100 MG Caps  Commonly known as:  COLACE, DULCOLAX  Take 100 mg by mouth 2 times daily as needed for Constipation     linezolid 600 MG tablet  Commonly known as:  ZYVOX  Take 1 tablet by mouth every 12 hours for 25 days     naloxone 4 MG/0.1ML Liqd nasal spray  1 spray by Nasal route as needed for Opioid Reversal     nicotine 21 MG/24HR  Commonly known as:  NICODERM CQ  Place 1 patch onto the skin daily as needed (if pateint smokes)     oxyCODONE-acetaminophen 5-325 MG per tablet  Commonly known as:  PERCOCET  Take 1-2 tablets by mouth every 4 hours as needed for Pain for up to 3 days. polyethylene glycol 17 g packet  Commonly known as:  GLYCOLAX  Take 17 g by mouth 2 times daily        CONTINUE taking these medications    apixaban 5 MG Tabs tablet  Commonly known as:  Eliquis  Take 1 tablet by mouth 2 times daily     aspirin 81 MG chewable tablet  Take 1 tablet by mouth daily     atorvastatin 40 MG tablet  Commonly known as:  LIPITOR  Take 1 tablet by mouth daily     blood glucose test strips strip  Commonly known as:  ASCENSIA AUTODISC VI;ONE TOUCH ULTRA TEST VI  Use with associated glucose meter to check fluctuating blood sugars BID     dilTIAZem 60 MG tablet  Commonly known as:  CARDIZEM     famotidine 20 MG tablet  Commonly known as:  PEPCID  Take 1 tablet by mouth 2 times daily     glucose monitoring kit monitoring kit  1 kit by Does not apply route daily     * insulin lispro 100 UNIT/ML injection vial  Commonly known as:  HUMALOG  Inject 0-18 Units into the skin 3 times daily (with meals)     * insulin lispro 100 UNIT/ML injection vial  Commonly known as:  HUMALOG  Inject 0-9 Units into the skin nightly     Insulin Pen Needle 32G X 4 MM Misc  1 each by Does not apply route daily     ipratropium-albuterol 0.5-2.5 (3) MG/3ML Soln nebulizer solution  Commonly known as:  DUONEB  Inhale 3 mLs into the lungs every 4 hours (while awake)     metFORMIN 500 MG tablet  Commonly known as:  GLUCOPHAGE  Take 1 tablet by mouth 2 times daily (with meals)     nortriptyline 25 MG capsule  Commonly known as:  PAMELOR     Tresiba FlexTouch 100 UNIT/ML Sopn  Generic drug:  Insulin Degludec     * TRUEplus Lancets 30G Misc  Inject 1 each into the skin 3 times daily TO CHECK FLUCTUATING BLOOD SUGARS IE HYPERGLYCEMIA     * Lancets Misc  1 each by Does not apply route 2 times daily     Ventolin  (90 Base) MCG/ACT inhaler  Generic drug:  albuterol sulfate HFA         * This list has 4 medication(s) that are the same as other medications prescribed for you.  Read the directions carefully, and ask your doctor or other care provider to review them with you. Where to Get Your Medications      You can get these medications from any pharmacy    Bring a paper prescription for each of these medications  · naloxone 4 MG/0.1ML Liqd nasal spray  · oxyCODONE-acetaminophen 5-325 MG per tablet     Information about where to get these medications is not yet available    Ask your nurse or doctor about these medications  · ciprofloxacin 500 MG tablet  · docusate 100 MG Caps  · linezolid 600 MG tablet  · nicotine 21 MG/24HR  · polyethylene glycol 17 g packet         No discharge procedures on file. Time Spent on discharge is  35 mins in patient examination, evaluation, counseling as well as medication reconciliation, prescriptions for required medications, discharge plan and follow up. Electronically signed by   Richard Parrish MD  9/2/2020  12:17 PM      Thank you Dr. Lady Armando DO for the opportunity to be involved in this patient's care.

## 2020-09-02 NOTE — CARE COORDINATION
Social Work-PT note is in the computer. Notified [facility] and requested that they fax note ASAP since [employer] will need to see it before noon.  Marianna Noguera

## 2020-09-02 NOTE — PROGRESS NOTES
Learning About the Safe Use of Antibiotics  Introduction  Antibiotics are drugs used to kill bacteria. Bacteria can cause infections. These include strep throat, ear infections, and pneumonia. These medicines can't cure everything. They don't kill viruses or help with allergies. They don't help illnesses such as the common cold, the flu, or a runny nose. And they can cause side effects. There are many types of antibiotics. Your doctor will decide which one will work best for your infection. Examples include:  · Amoxicillin. · Cephalexin (Keflex). · Ciprofloxacin (Cipro). What are the possible side effects? Side effects can include:  · Nausea. · Diarrhea. · Skin rash. · Yeast infection. · A severe allergic reaction. It may cause itching, swelling, and breathing problems. This is rare. You may have other side effects or reactions not listed here. Check the information that comes with your medicine. Should you take antibiotics just in case? Don't take antibiotics when you don't need them. If you do that, they may not work when you do need them. Each time you take antibiotics, you are more likely to have some bacteria that survive and aren't killed by the medicine. Bacteria that don't die can change and become even harder to kill. These are called antibiotic-resistant bacteria. They can cause longer and more serious infections. To treat them, you may need different, stronger antibiotics that have more side effects and may cost more. So always ask your doctor if antibiotics are the best treatment. Explain that you do not want antibiotics unless you need them. Help protect the community  Using antibiotics when they're not needed leads to the development of antibiotic-resistant bacteria. These tougher bacteria can spread to family members, children, and coworkers. People in your community will have a risk of getting an infection that is harder to cure and that costs more to treat.   How can you take antibiotics safely? Be safe with medicine. Take your antibiotics as directed. Do not stop taking them just because you feel better. You need to take the full course of medicine. This will help make sure your infection is cured. It will also help prevent the growth of antibiotic-resistant bacteria. Always take the exact amount that the label says to take. If the label says to take the medicine at a certain time, follow those directions. You might feel better after you take an antibiotic for a few days. But it is important to keep taking it for as long as prescribed. That will help you get rid of those bacteria that are a bit stronger and that survive the first few days of treatment. Where can you learn more? Go to https://[employer]. org and sign in to your [employer] account. Enter A231 in the [facility] box to learn more about \"Learning About the Safe Use of Antibiotics. \"     If you do not have an account, please click on the \"Sign Up Now\" link. Current as of: March 3, 2017  Content Version: 11.3  © 0171-9337 [employer]. Care instructions adapted under license by [facility] ([facility]). If you have questions about a medical condition or this instruction, always ask your healthcare professional. [place] [address] any warranty or liability for your use of this information. Antibiotics are powerful drugs that are generally safe and very helpful in fighting disease, but there are times when antibiotics can actually be harmful. Antibiotics can have side effects, including allergic reactions and a potentially deadly diarrhea caused by the bacteria Clostridium difficile (C. diff). Antibiotics can also interfere with the action of other drugs a patient may be taking for another condition. These unintended reactions to antibiotics are called adverse drug events.    When someone takes an antibiotic that they do not need, they are needlessly exposed to the side effects of the drug and do not get any benefit from it. Moreover, taking an antibiotic when it is not needed can lead to the development of antibiotic resistance. When resistance develops, antibiotics may not be able to stop future infections. Every time someone takes an antibiotic they dont need, they increase their risk of developing a resistant infection in the future. Types of Adverse Drug Events Related to Antibiotics  Allergic Reactions  Every year, there are more than 140,000 emergency department visits for reactions to antibiotics. Almost four out of five (79%) emergency department visits for antibiotic-related adverse drug events are due to an allergic reaction. These reactions can range from mild rashes and itching to serious blistering skin reactions swelling of the face and throat, and breathing problems. Minimizing unnecessary antibiotic use is the best way to reduce the risk of adverse drug events from antibiotics. Patients should tell their doctors about any past drug reactions or allergies. C. difficile  C. difficile causes diarrhea linked to at least 14,000 [place] deaths each year. When a person takes antibiotics, good bacteria that protect against infection are destroyed for several months. During this time, patients can get sick from C. difficile picked up from contaminated surfaces or spread from a healthcare providers hands. Those most at risk are people, especially older adults, who take antibiotics and also get medical care. Take antibiotics exactly and only as prescribed. Drug Interactions and Side Effects  Antibiotics can interact with other drugs patients take, making those drugs or the antibiotics less effective. Some drug combinations can worsen the side effects of the antibiotic or other drug. Common side effects of antibiotics include nausea, diarrhea, and stomach pain. Sometimes these symptoms can lead to dehydration and other problems.  Patients should ask their doctors about drug interactions and the potential side effects of antibiotics.  The doctor should be told immediately if a patient has any side effects from antibiotics  Page last updated: February 24, 2017 Content source:   Centers for Disease Control and [employer] for Emerging and Zoonotic Infectious Diseases ([place])  Division of Healthcare Quality Promotion [facility], [facility]
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
## 2020-09-03 ENCOUNTER — CARE COORDINATION (OUTPATIENT)
Dept: CARE COORDINATION | Age: 65
End: 2020-09-03

## 2020-09-03 VITALS
TEMPERATURE: 97.8 F | DIASTOLIC BLOOD PRESSURE: 69 MMHG | SYSTOLIC BLOOD PRESSURE: 142 MMHG | HEART RATE: 98 BPM | OXYGEN SATURATION: 95 % | WEIGHT: 157.5 LBS | BODY MASS INDEX: 21.33 KG/M2 | RESPIRATION RATE: 18 BRPM | HEIGHT: 72 IN

## 2020-09-03 LAB
BUN BLDV-MCNC: 33 MG/DL (ref 8–23)
C-REACTIVE PROTEIN: 57.9 MG/L (ref 0–5)
CREAT SERPL-MCNC: 1.08 MG/DL (ref 0.7–1.2)
GFR AFRICAN AMERICAN: >60 ML/MIN
GFR NON-AFRICAN AMERICAN: >60 ML/MIN
GFR SERPL CREATININE-BSD FRML MDRD: ABNORMAL ML/MIN/{1.73_M2}
GFR SERPL CREATININE-BSD FRML MDRD: ABNORMAL ML/MIN/{1.73_M2}
GLUCOSE BLD-MCNC: 127 MG/DL (ref 75–110)
GLUCOSE BLD-MCNC: 61 MG/DL (ref 75–110)
GLUCOSE BLD-MCNC: 81 MG/DL (ref 75–110)

## 2020-09-03 PROCEDURE — 99231 SBSQ HOSP IP/OBS SF/LOW 25: CPT | Performed by: INTERNAL MEDICINE

## 2020-09-03 PROCEDURE — 84520 ASSAY OF UREA NITROGEN: CPT

## 2020-09-03 PROCEDURE — 82947 ASSAY GLUCOSE BLOOD QUANT: CPT

## 2020-09-03 PROCEDURE — 6370000000 HC RX 637 (ALT 250 FOR IP): Performed by: FAMILY MEDICINE

## 2020-09-03 PROCEDURE — 6370000000 HC RX 637 (ALT 250 FOR IP): Performed by: INTERNAL MEDICINE

## 2020-09-03 PROCEDURE — 86140 C-REACTIVE PROTEIN: CPT

## 2020-09-03 PROCEDURE — 82565 ASSAY OF CREATININE: CPT

## 2020-09-03 PROCEDURE — 6370000000 HC RX 637 (ALT 250 FOR IP): Performed by: STUDENT IN AN ORGANIZED HEALTH CARE EDUCATION/TRAINING PROGRAM

## 2020-09-03 PROCEDURE — 2580000003 HC RX 258: Performed by: STUDENT IN AN ORGANIZED HEALTH CARE EDUCATION/TRAINING PROGRAM

## 2020-09-03 PROCEDURE — 36415 COLL VENOUS BLD VENIPUNCTURE: CPT

## 2020-09-03 RX ORDER — POLYETHYLENE GLYCOL 3350 17 G/17G
17 POWDER, FOR SOLUTION ORAL 2 TIMES DAILY
Qty: 527 G | Refills: 1 | Status: SHIPPED | OUTPATIENT
Start: 2020-09-03 | End: 2020-10-04

## 2020-09-03 RX ORDER — NICOTINE 21 MG/24HR
1 PATCH, TRANSDERMAL 24 HOURS TRANSDERMAL DAILY PRN
Qty: 15 PATCH | Refills: 0 | Status: SHIPPED | OUTPATIENT
Start: 2020-09-03 | End: 2020-12-14 | Stop reason: ALTCHOICE

## 2020-09-03 RX ORDER — LINEZOLID 600 MG/1
600 TABLET, FILM COATED ORAL EVERY 12 HOURS SCHEDULED
Qty: 50 TABLET | Refills: 0 | Status: SHIPPED | OUTPATIENT
Start: 2020-09-03 | End: 2020-09-28

## 2020-09-03 RX ORDER — NALOXONE HYDROCHLORIDE 4 MG/.1ML
1 SPRAY NASAL PRN
Qty: 1 EACH | Refills: 5 | Status: SHIPPED | OUTPATIENT
Start: 2020-09-03 | End: 2020-12-14 | Stop reason: ALTCHOICE

## 2020-09-03 RX ORDER — CIPROFLOXACIN 500 MG/1
500 TABLET, FILM COATED ORAL EVERY 12 HOURS SCHEDULED
Qty: 50 TABLET | Refills: 0 | Status: SHIPPED | OUTPATIENT
Start: 2020-09-03 | End: 2020-09-28

## 2020-09-03 RX ORDER — PSEUDOEPHEDRINE HCL 30 MG
100 TABLET ORAL 2 TIMES DAILY PRN
Qty: 60 CAPSULE | Refills: 0 | Status: SHIPPED | OUTPATIENT
Start: 2020-09-03 | End: 2020-12-14 | Stop reason: ALTCHOICE

## 2020-09-03 RX ADMIN — ATORVASTATIN CALCIUM 40 MG: 40 TABLET, FILM COATED ORAL at 08:58

## 2020-09-03 RX ADMIN — POLYETHYLENE GLYCOL (3350) 17 G: 17 POWDER, FOR SOLUTION ORAL at 08:58

## 2020-09-03 RX ADMIN — DOCUSATE SODIUM 100 MG: 100 CAPSULE, LIQUID FILLED ORAL at 08:58

## 2020-09-03 RX ADMIN — OXYCODONE HYDROCHLORIDE AND ACETAMINOPHEN 2 TABLET: 5; 325 TABLET ORAL at 06:00

## 2020-09-03 RX ADMIN — OXYCODONE HYDROCHLORIDE AND ACETAMINOPHEN 2 TABLET: 5; 325 TABLET ORAL at 01:07

## 2020-09-03 RX ADMIN — SODIUM CHLORIDE, PRESERVATIVE FREE 10 ML: 5 INJECTION INTRAVENOUS at 09:04

## 2020-09-03 RX ADMIN — APIXABAN 5 MG: 5 TABLET, FILM COATED ORAL at 08:58

## 2020-09-03 RX ADMIN — METFORMIN HYDROCHLORIDE 500 MG: 500 TABLET ORAL at 08:58

## 2020-09-03 RX ADMIN — FAMOTIDINE 20 MG: 20 TABLET ORAL at 08:58

## 2020-09-03 RX ADMIN — ASPIRIN 81 MG 81 MG: 81 TABLET ORAL at 08:58

## 2020-09-03 RX ADMIN — CIPROFLOXACIN 500 MG: 500 TABLET ORAL at 08:58

## 2020-09-03 RX ADMIN — DILTIAZEM HYDROCHLORIDE 60 MG: 60 TABLET, FILM COATED ORAL at 08:58

## 2020-09-03 RX ADMIN — LINEZOLID 600 MG: 600 TABLET, FILM COATED ORAL at 08:58

## 2020-09-03 ASSESSMENT — PAIN DESCRIPTION - ORIENTATION: ORIENTATION: LEFT;LOWER

## 2020-09-03 ASSESSMENT — PAIN DESCRIPTION - FREQUENCY: FREQUENCY: CONTINUOUS

## 2020-09-03 ASSESSMENT — PAIN DESCRIPTION - PAIN TYPE: TYPE: CHRONIC PAIN

## 2020-09-03 ASSESSMENT — PAIN DESCRIPTION - LOCATION: LOCATION: GENERALIZED

## 2020-09-03 ASSESSMENT — PAIN DESCRIPTION - PROGRESSION: CLINICAL_PROGRESSION: NOT CHANGED

## 2020-09-03 ASSESSMENT — PAIN SCALES - GENERAL
PAINLEVEL_OUTOF10: 8
PAINLEVEL_OUTOF10: 8

## 2020-09-03 ASSESSMENT — PAIN DESCRIPTION - DESCRIPTORS: DESCRIPTORS: ACHING;DISCOMFORT

## 2020-09-03 NOTE — CARE COORDINATION
Chiquita from [facility] informed of D/C today. Office will pull LOREE from Epic. Foster Rojas will be delivered to room this AM prior to D/C.

## 2020-09-03 NOTE — PROGRESS NOTES
Pt discharged to home with [facility] in stable condition with belongings  Discharge instructions given  Discharge checklist reviewed and completed with pt and family. Pt denies having any further questions at this time  Personal items given to patient at discharge  Patient states they have everything they were admitted with.   Dressing change supplies sent home with patient
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
## 2020-09-03 NOTE — PLAN OF CARE
Problem: Falls - Risk of:  Goal: Will remain free from falls  Description: Will remain free from falls  9/3/2020 1050 by Faith Cantor RN  Outcome: Ongoing  Note: Room free of clutter  Hourly rounding   Non-skid socks worn  Side rails up x2  Bed low and locked  Call light in reach  Instructed to call out before getting out of bed  Anticipatory needs met  Bed alarm on  Falling star at the door and on wristband       Problem: Pain:  Goal: Pain level will decrease  Description: Pain level will decrease  Outcome: Ongoing  Note: Pain level assessed and rated on a 0-10 scale  Assess characteristics of pain  PRN pain medication given per pt request  Non-pharmacological interventions implemented  Report ineffective pain management to physician  Update pt and family of any changes  Pt instructed to call out with new onset of pain  Continue to monitor

## 2020-09-04 NOTE — TELEPHONE ENCOUNTER
Patient is back home ,patient also was suppose to be sent  Home with meds  and doesn't have Lots of his meds   [address]   Patient was also supposed to get linezolid 600 mg q12 for 25 days and the hospital did not send it with him. Please fax sliding scale to the [facility]  [phone] and also they had naloxone 4 mg liquid nasal spray for opiod reversal was on his discharge paper but he doesn't have it.   Do you want him  to have it and do you want him to have a nicotine patch    [place] from the [facility] and can you call them in today because he does not have many of them

## 2020-09-05 RX ORDER — ATORVASTATIN CALCIUM 40 MG/1
40 TABLET, FILM COATED ORAL DAILY
Qty: 90 TABLET | Refills: 3 | Status: SHIPPED | OUTPATIENT
Start: 2020-09-05 | End: 2020-12-14 | Stop reason: ALTCHOICE

## 2020-09-05 RX ORDER — FAMOTIDINE 20 MG/1
20 TABLET, FILM COATED ORAL 2 TIMES DAILY
Qty: 60 TABLET | Refills: 11 | Status: SHIPPED | OUTPATIENT
Start: 2020-09-05 | End: 2020-09-06 | Stop reason: SDUPTHER

## 2020-09-05 RX ORDER — DILTIAZEM HYDROCHLORIDE 60 MG/1
60 TABLET, FILM COATED ORAL 4 TIMES DAILY
Qty: 120 TABLET | Refills: 11 | Status: SHIPPED | OUTPATIENT
Start: 2020-09-05 | End: 2020-09-06 | Stop reason: SDUPTHER

## 2020-09-05 RX ORDER — ASPIRIN 81 MG/1
81 TABLET, CHEWABLE ORAL DAILY
Qty: 30 TABLET | Refills: 3 | Status: SHIPPED | OUTPATIENT
Start: 2020-09-05 | End: 2020-09-17 | Stop reason: SDUPTHER

## 2020-09-06 RX ORDER — DILTIAZEM HYDROCHLORIDE 60 MG/1
TABLET, FILM COATED ORAL
Qty: 360 TABLET | Refills: 11 | Status: SHIPPED | OUTPATIENT
Start: 2020-09-06 | End: 2020-12-14 | Stop reason: ALTCHOICE

## 2020-09-06 RX ORDER — FAMOTIDINE 20 MG/1
TABLET, FILM COATED ORAL
Qty: 180 TABLET | Refills: 11 | Status: SHIPPED | OUTPATIENT
Start: 2020-09-06 | End: 2020-12-14 | Stop reason: ALTCHOICE

## 2020-09-08 ENCOUNTER — TELEPHONE (OUTPATIENT)
Dept: FAMILY MEDICINE CLINIC | Age: 65
End: 2020-09-08

## 2020-09-08 RX ORDER — INSULIN LISPRO 100 [IU]/ML
10 INJECTION, SOLUTION INTRAVENOUS; SUBCUTANEOUS 3 TIMES DAILY
Qty: 5 PEN | Refills: 5 | Status: ON HOLD | OUTPATIENT
Start: 2020-09-08 | End: 2021-01-11 | Stop reason: HOSPADM

## 2020-09-08 NOTE — TELEPHONE ENCOUNTER
Is patient willing to  script ; does not normally get filled due to cost . If we have samples he can come in and pick some of these up

## 2020-09-08 NOTE — TELEPHONE ENCOUNTER
There is eliquis but I did not send this and it is for his heart ; I did send humalog which is for his diabetes

## 2020-09-08 NOTE — TELEPHONE ENCOUNTER
Miles from [facility] called after seeing patient today. His BS was 405 then told miles he had just ate calli marks prior. Patient was not sent home from the ER with a sliding scale and is out of insulin. Please advise. Thank you.

## 2020-09-08 NOTE — TELEPHONE ENCOUNTER
Patient wants to know the medication that was sent to the pharmacy he wants to know what it is for? The pharmacy told patient that it is for his heart. He just wants further clarification what the medication is for.

## 2020-09-08 NOTE — TELEPHONE ENCOUNTER
Patient called requesting samples of insilin. Patient states that he just got out of the hospital.     Please advise.    Thank you

## 2020-09-09 ENCOUNTER — TELEPHONE (OUTPATIENT)
Dept: PODIATRY | Age: 65
End: 2020-09-09

## 2020-09-10 ENCOUNTER — TELEPHONE (OUTPATIENT)
Dept: FAMILY MEDICINE CLINIC | Age: 65
End: 2020-09-10

## 2020-09-10 NOTE — TELEPHONE ENCOUNTER
Patient is coming for an appointment but his  would like someone to get him set with patient assistance for his insulin. He needs help so what ever insulin you set him up with .   He is unable to pay for it and he only gets 1,0000 dollars a month

## 2020-09-11 NOTE — TELEPHONE ENCOUNTER
Is this something we could contact Alexandra Corral our care coordinator about , typically the or has to initiate the process for patient assistance , he may not qualify but he could try as he has a lot of assets ie cars and houses

## 2020-09-14 ENCOUNTER — OFFICE VISIT (OUTPATIENT)
Dept: FAMILY MEDICINE CLINIC | Age: 65
End: 2020-09-14
Payer: MEDICARE

## 2020-09-14 VITALS
OXYGEN SATURATION: 94 % | SYSTOLIC BLOOD PRESSURE: 126 MMHG | RESPIRATION RATE: 20 BRPM | DIASTOLIC BLOOD PRESSURE: 78 MMHG | HEART RATE: 110 BPM

## 2020-09-14 PROCEDURE — 99214 OFFICE O/P EST MOD 30 MIN: CPT | Performed by: INTERNAL MEDICINE

## 2020-09-14 PROCEDURE — 3046F HEMOGLOBIN A1C LEVEL >9.0%: CPT | Performed by: INTERNAL MEDICINE

## 2020-09-14 PROCEDURE — 3017F COLORECTAL CA SCREEN DOC REV: CPT | Performed by: INTERNAL MEDICINE

## 2020-09-14 PROCEDURE — G8427 DOCREV CUR MEDS BY ELIG CLIN: HCPCS | Performed by: INTERNAL MEDICINE

## 2020-09-14 PROCEDURE — G8420 CALC BMI NORM PARAMETERS: HCPCS | Performed by: INTERNAL MEDICINE

## 2020-09-14 PROCEDURE — 3023F SPIROM DOC REV: CPT | Performed by: INTERNAL MEDICINE

## 2020-09-14 PROCEDURE — G8926 SPIRO NO PERF OR DOC: HCPCS | Performed by: INTERNAL MEDICINE

## 2020-09-14 PROCEDURE — 4004F PT TOBACCO SCREEN RCVD TLK: CPT | Performed by: INTERNAL MEDICINE

## 2020-09-14 PROCEDURE — 1111F DSCHRG MED/CURRENT MED MERGE: CPT | Performed by: INTERNAL MEDICINE

## 2020-09-14 PROCEDURE — 2022F DILAT RTA XM EVC RTNOPTHY: CPT | Performed by: INTERNAL MEDICINE

## 2020-09-14 RX ORDER — OXYCODONE HYDROCHLORIDE AND ACETAMINOPHEN 5; 325 MG/1; MG/1
TABLET ORAL
Status: ON HOLD | COMMUNITY
Start: 2020-09-03 | End: 2021-01-09 | Stop reason: HOSPADM

## 2020-09-14 ASSESSMENT — PATIENT HEALTH QUESTIONNAIRE - PHQ9
1. LITTLE INTEREST OR PLEASURE IN DOING THINGS: 0
SUM OF ALL RESPONSES TO PHQ QUESTIONS 1-9: 0
2. FEELING DOWN, DEPRESSED OR HOPELESS: 0
SUM OF ALL RESPONSES TO PHQ QUESTIONS 1-9: 0
SUM OF ALL RESPONSES TO PHQ9 QUESTIONS 1 & 2: 0

## 2020-09-14 NOTE — PROGRESS NOTES
Visit Information    Have you changed or started any medications since your last visit including any over-the-counter medicines, vitamins, or herbal medicines? no   Are you having any side effects from any of your medications? -  no  Have you stopped taking any of your medications? Is so, why? -  yes -     Have you seen any other physician or provider since your last visit? No  Have you had any other diagnostic tests since your last visit? No  Have you been seen in the emergency room and/or had an admission to a hospital since we last saw you? Yes - Records Obtained  Have you had your routine dental cleaning in the past 6 months? no    Have you activated your [employer] account? If not, what are your barriers?  Yes     Patient Care Team:  Lan Burns DO as PCP - General (Family Medicine)  Lan Burns DO as PCP - [facility] Provider  Janki Lyn MD as Surgeon (Surgical Oncology)  Jt Gomez DPM as Surgeon (Podiatry)  Reed Chao MD as Consulting Physician (Ophthalmology)  Aisha Barron MD as Consulting Physician (Gastroenterology)  [facility] History Review  Past Medical, Family, and Social History reviewed and does contribute to the patient presenting condition    Health Maintenance   Topic Date Due    Hepatitis C screen  1955    HIV screen  10/10/1972    Shingles Vaccine (1 of 2) 10/10/2007    Diabetic retinal exam  03/18/2016    Colon cancer screen colonoscopy  04/26/2017    Low dose CT lung screening  01/20/2018    Lipid screen  06/24/2019    Diabetic microalbuminuria test  01/10/2020    Flu vaccine (1) 09/01/2020    Annual Wellness Visit (AWV)  11/11/2020 (Originally 7/18/2020)    A1C test (Diabetic or Prediabetic)  10/08/2020    Diabetic foot exam  07/27/2021    Potassium monitoring  08/26/2021    Creatinine monitoring  09/03/2021    DTaP/Tdap/Td vaccine (2 - Td) 07/01/2029    Pneumococcal 0-64 years Vaccine  Completed    Hepatitis A vaccine  Aged Out    Hib vaccine  Aged Out    Meningococcal (ACWY) vaccine  Aged Out

## 2020-09-14 NOTE — PROGRESS NOTES
Post-Discharge Transitional Care Management Services or Hospital Follow Up      Kimberly Jorgensen   YOB: 1955    Date of Office Visit:  9/14/2020  Date of Hospital Admission: 8/22/20  Date of Hospital Discharge: 9/3/20  Readmission Risk Score(high >=14%.  Medium >=10%):Readmission Risk Score: 68      Care management risk score Rising risk (score 2-5) and Complex Care (Scores >=6): 6     Non face to face  following discharge, date last encounter closed (first attempt may have been earlier): *No documented post hospital discharge outreach found in the last 14 days *No documented post hospital discharge outreach found in the last 14 days    Call initiated 2 business days of discharge: *No response recorded in the last 14 days     Patient Active Problem List   Diagnosis    Type 2 diabetes mellitus with diabetic polyneuropathy, with long-term current use of insulin ([address]    Neuropathic pain, leg    Diabetic polyneuropathy ([address]    Allergic rhinitis    Tobacco dependence    COPD (chronic obstructive pulmonary disease) ([address]    Edentulous    Dysphagia    Lung nodules    Esophageal cancer ([address]    Fracture of humerus, left, closed    Closed fracture of humerus    DM type 2 with diabetic peripheral neuropathy ([address]    COPD without exacerbation ([address]    Dyslipidemia    Gastroesophageal reflux disease    Chronic pain syndrome    Marijuana use    History of colon polyps    Cellulitis of right heel    PVD (peripheral vascular disease) ([address]    Functional diarrhea    Sepsis due to methicillin resistant Staphylococcus aureus (MRSA) ([address]    History of esophageal cancer    Osteomyelitis, chronic, ankle or foot ([address]    Peripheral artery disease ([address]    Other pulmonary embolism without acute cor pulmonale ([address]    Carotid stenosis, asymptomatic, bilateral    Lower limb amputation status    Fx humeral neck, right, closed, initial encounter    Transient hypotension    Constipation due to opioid therapy    Acute kidney injury ([address]    Diabetic ulcer of left midfoot associated with type 2 diabetes mellitus, with fat layer exposed ([address]    Complication of below knee amputation stump ([address]    COPD exacerbation ([address]    Hyponatremia    Pain, phantom limb ([address]    Below-knee amputation of right lower extremity (HCC)    Hyperglycemia    Moderate protein malnutrition ([address]    Essential hypertension    Uncontrolled type 2 diabetes mellitus with hyperglycemia ([address]    History of pulmonary embolism - 2017    Atelectasis    Uncontrolled type 2 diabetes mellitus with foot ulcer (HCC)    Azotemia    Anemia, normocytic normochromic    Osteomyelitis of fourth phalange of left foot (HCC)    Drug-induced constipation    Osteomyelitis ([address]    Diabetic foot infection ([address]    Noncompliance       Allergies   Allergen Reactions    Gabapentin Other (See Comments)     dizziness       Medications listed as ordered at the time of discharge from hospital   Constance LOPEZ   Home Medication Instructions TRAY:    Printed on:09/14/20 1025   Medication Information                      albuterol sulfate HFA (VENTOLIN HFA) 108 (90 Base) MCG/ACT inhaler  Inhale 2 puffs into the lungs every 6 hours as needed for Wheezing             apixaban (ELIQUIS) 5 MG TABS tablet  Take 1 tablet by mouth 2 times daily             aspirin 81 MG chewable tablet  Take 1 tablet by mouth daily             atorvastatin (LIPITOR) 40 MG tablet  Take 1 tablet by mouth daily             blood glucose test strips (ASCENSIA AUTODISC VI;ONE TOUCH ULTRA TEST VI) strip  Use with associated glucose meter to check fluctuating blood sugars BID             ciprofloxacin (CIPRO) 500 MG tablet  Take 1 tablet by mouth every 12 hours for 25 days             dilTIAZem (CARDIZEM) 60 MG tablet  TAKE 1 TABLET BY MOUTH FOUR TIMES DAILY             docusate (COLACE, DULCOLAX) 100 MG CAPS  Take 100 mg by mouth 2 times daily as needed (constipation)             famotidine (PEPCID) 20 MG tablet  TAKE 1 TABLET BY MOUTH TWICE DAILY             glucose monitoring kit (FREESTYLE) monitoring kit  1 kit by Does not apply route daily             Insulin Degludec (TRESIBA FLEXTOUCH) 100 UNIT/ML SOPN  Inject 70 Units into the skin nightly             insulin lispro, 1 Unit Dial, (ADMELOG SOLOSTAR) 100 UNIT/ML SOPN  Inject 10 Units into the skin 3 times daily             Insulin Pen Needle 32G X 4 MM MISC  1 each by Does not apply route daily             ipratropium-albuterol (DUONEB) 0.5-2.5 (3) MG/3ML SOLN nebulizer solution  Inhale 3 mLs into the lungs every 4 hours (while awake)             Lancets MISC  1 each by Does not apply route 2 times daily             linezolid (ZYVOX) 600 MG tablet  Take 1 tablet by mouth every 12 hours for 25 days             metFORMIN (GLUCOPHAGE) 500 MG tablet  Take 1 tablet by mouth 2 times daily (with meals)             naloxone 4 MG/0.1ML LIQD nasal spray  1 spray by Nasal route as needed for Opioid Reversal             nicotine (NICODERM CQ) 21 MG/24HR  Place 1 patch onto the skin daily as needed (if pateint smokes)             nortriptyline (PAMELOR) 25 MG capsule  Take 50 mg by mouth nightly             oxyCODONE-acetaminophen (PERCOCET) 5-325 MG per tablet  TK 1 TO 2 TS PO Q 4 H PRN P             polyethylene glycol (GLYCOLAX) 17 g packet  Take 17 g by mouth 2 times daily             TRUEplus Lancets 30G MISC  Inject 1 each into the skin 3 times daily TO CHECK FLUCTUATING BLOOD SUGARS IE HYPERGLYCEMIA                   Medications marked \"taking\" at this time  Outpatient Medications Marked as Taking for the 9/14/20 encounter (Office Visit) with Gurvinder Mitchell DO   Medication Sig Dispense Refill    dilTIAZem (CARDIZEM) 60 MG tablet TAKE 1 TABLET BY MOUTH FOUR TIMES DAILY 360 tablet 11    famotidine (PEPCID) 20 MG tablet TAKE 1 TABLET BY MOUTH TWICE DAILY 180 tablet 11    aspirin 81 MG chewable tablet Take 1 tablet by mouth daily 30 tablet 3    atorvastatin (LIPITOR) 40 MG tablet Take 1 tablet by mouth daily 90 tablet 3    ciprofloxacin (CIPRO) 500 MG tablet Take 1 tablet by mouth every 12 hours for 25 days 50 tablet 0    docusate (COLACE, DULCOLAX) 100 MG CAPS Take 100 mg by mouth 2 times daily as needed (constipation) 60 capsule 0    nortriptyline (PAMELOR) 25 MG capsule Take 50 mg by mouth nightly      metFORMIN (GLUCOPHAGE) 500 MG tablet Take 1 tablet by mouth 2 times daily (with meals) 180 tablet 5        Medications patient taking as of now reconciled against medications ordered at time of hospital discharge: Yes    Chief Complaint   Patient presents with    [facility]     requesting perocept     Discuss Medications     out of insulin    Other     sugar 432       HPI    Inpatient course: Discharge summary reviewed- see chart. Interval history/Current status: at home   Refused rehab   ohioans coming out to house daily for dressing changes   No consitutional sxs  Still on cpiro and linzeloid   Already missed f/u on 9/9 or 9/11 for podiatry post op check   He states they did not call him   Still refuses to  insulin   Care coordinator recommended pt assistance program , will fill out but pt unlikely to get approved to multiple assists he has ie cars houses   Sugar today in office is 425   Has been taking left over humalog 1-2 times a day but only 8 units     Review of Systems    Vitals:    09/14/20 0953   BP: 126/78   Pulse: 110   Resp: 20   SpO2: 94%     There is no height or weight on file to calculate BMI. Wt Readings from Last 3 Encounters:   09/03/20 157 lb 8 oz (71.4 kg)   08/05/20 170 lb (77.1 kg)   08/03/20 170 lb (77.1 kg)     BP Readings from Last 3 Encounters:   09/14/20 126/78   09/03/20 (!) 142/69   08/24/20 (!) 83/54       Physical Exam        Assessment/Plan:  1.  Type 2 diabetes mellitus with diabetic polyneuropathy, with long-term current use of insulin ([facility]  Insulin ie lantus 70 units   Will fill ou forms   Patient non compliant   Follow up with specialists as scheduled   humalog 2 samples given take 10-15 units three times a day or with meals       2. Neuropathic pain of right lower extremity    - [place] DISCHARGE MEDS RECONCILED W/ CURRENT OUTPATIENT MED LIST    3. PVD (peripheral vascular disease) ([facility]  - [place] DISCHARGE MEDS RECONCILED W/ CURRENT OUTPATIENT MED LIST    4. Peripheral artery disease ([facility]  - [place] DISCHARGE MEDS RECONCILED W/ CURRENT OUTPATIENT MED LIST    5.  Pulmonary emphysema, unspecified emphysema type [facility])        Medical Decision Making: moderate complexity

## 2020-09-15 ENCOUNTER — CARE COORDINATION (OUTPATIENT)
Dept: CARE COORDINATION | Age: 65
End: 2020-09-15

## 2020-09-15 NOTE — CARE COORDINATION
Ambulatory Care Coordination Note  9/15/2020  CM Risk Score: 6  Charlson 10 Year Mortality Risk Score: 100%     ACC: Yojana Uribe, RN    Provider asked ACM contact patient to assist with patient assistance for his insulin costs   He received samples at his OV yesterday      Summary Note; Spoke to patient, explained ACM role and care coordination and agreeable   Reports, he's on a limited income and couldn't afford his insulin   Patient does have a history of non-adherence    During the conversation, the phone call was lost /disconnected   ACM called him back,  and it went to . Left him a message and requested a call back     CM plan; Medication patient assistance referral to Margarito Ladna   Next call; review BS testing, diet, med adherence and assessments     Diabetes Assessment    Medic Alert ID:  No  Meal Planning:  Avoidance of concentrated sweets   How often do you test your blood sugar?:  Daily   Do you have barriers with adherence to non-pharmacologic self-management interventions?  (Nutrition/Exercise/Self-Monitoring):  Yes   Have you ever had to go to the ED for symptoms of low blood sugar?:  No       Increase or Decrease trend in Blood Sugars      ,   COPD Assessment    Does the patient understand envrionmental exposure?:  Yes  Does the patient have a nebulizer?:  Yes            Symptoms:      Symptom course:  stable      and   General Assessment    Do you have any symptoms that are causing concern?:  No                 Care Coordination Interventions    Program Enrollment:  Rising Risk  Referral from Primary Care Provider:  Yes  Suggested Interventions and Community Resources         Goals Addressed                 This Visit's Progress     COMPLETED: Care Coordination Self Management        CC Self Management Goal  Patient Goal (What steps will patient take to achieve goal?): monitor and report blood sugar, low carb diet  Patient is able to discuss self-management of condition(s):Diabetes  Pt demonstrates adherence to medications  Pt demonstrates understanding of self-monitoring, CC reviewed and demonstrated how to use meter, patient returned demonstration 7.23.18  Patient is able to identify Red Flags:  Alert to potential adverse drug reactions(s) or side effects and actions to take should they arise  Discuss target symptoms and actions to take should they arise  Identify problems that require immediate PCP or specialist visit  Patient demonstrates understanding of access to PCP/Specialist:  Understands about scheduling routine Follow Up appointments   Understands about sick day appointment options for worsening of symptoms/progression (Same Day, E Visits)  Extend goal date to 6-1-19 continue to work with CC team, now has transportation set up and patient assistance sent for insulin.  COMPLETED: Conditions and Symptoms        I will schedule office visits, as directed by my provider. I will keep my appointment or reschedule if I have to cancel. I will notify my provider of any symptoms that indicate a worsening of my condition. Barriers: none  Plan for overcoming my barriers: N/A  Confidence: 6/10  Anticipated Goal Completion Date: 5/8/18    Change goal date to 7.25.19, (Barrier)will work with [address] [place] [address] team to get assistance with transportation         Conditions and Symptoms        I will keep my appointment or reschedule if I have to cancel. I will notify my provider of any barriers to my plan of care. I will follow my Zone Management tool to seek urgent or emergent care. I will notify my provider of any symptoms that indicate a worsening of my condition. Barriers: lack of motivation, overwhelmed by complexity of regimen, and lack of education  Plan for overcoming my barriers: education, care coordination   Confidence: 7/10  Anticipated Goal Completion Date: 1/15/20              Prior to Admission medications    Medication Sig Start Date End Date Taking?  Authorizing Provider oxyCODONE-acetaminophen (PERCOCET) 5-325 MG per tablet TK 1 TO 2 TS PO Q 4 H PRN P 9/3/20   Historical Provider, MD   insulin lispro, 1 Unit Dial, (ADMELOG SOLOSTAR) 100 UNIT/ML SOPN Inject 10 Units into the skin 3 times daily  Patient not taking: Reported on 9/14/2020 9/8/20   Bess Woods, DO   dilTIAZem (CARDIZEM) 60 MG tablet TAKE 1 TABLET BY MOUTH FOUR TIMES DAILY 9/6/20 9/6/21  Bess Woods, DO   famotidine (PEPCID) 20 MG tablet TAKE 1 TABLET BY MOUTH TWICE DAILY 9/6/20 9/6/21  Bess Woods, DO   apixaban (ELIQUIS) 5 MG TABS tablet Take 1 tablet by mouth 2 times daily  Patient not taking: Reported on 9/14/2020 9/5/20   Bess Woods, DO   aspirin 81 MG chewable tablet Take 1 tablet by mouth daily 9/5/20   Bess Woods, DO   atorvastatin (LIPITOR) 40 MG tablet Take 1 tablet by mouth daily 9/5/20   Bess Woods, DO   Insulin Pen Needle 32G X 4 MM MISC 1 each by Does not apply route daily 9/5/20   Bess Woods, DO   ciprofloxacin (CIPRO) 500 MG tablet Take 1 tablet by mouth every 12 hours for 25 days 9/3/20 9/28/20  Geovany Choudhary MD   docusate (COLACE, DULCOLAX) 100 MG CAPS Take 100 mg by mouth 2 times daily as needed (constipation) 9/3/20   Geovany Choudhary MD   linezolid (ZYVOX) 600 MG tablet Take 1 tablet by mouth every 12 hours for 25 days 9/3/20 9/28/20  Geovany Choudhary MD   naloxone 4 MG/0.1ML LIQD nasal spray 1 spray by Nasal route as needed for Opioid Reversal  Patient not taking: Reported on 9/14/2020 9/3/20   Geovany Choudhary MD   nicotine (NICODERM CQ) 21 MG/24HR Place 1 patch onto the skin daily as needed (if pateint smokes)  Patient not taking: Reported on 9/14/2020 9/3/20   Geovany Choudhary MD   polyethylene glycol (GLYCOLAX) 17 g packet Take 17 g by mouth 2 times daily  Patient not taking: Reported on 9/14/2020 9/3/20 10/4/20  Geovany Choudhary MD   blood glucose test strips (ASCENSIA AUTODISC VI;ONE TOUCH ULTRA TEST VI) strip Use with associated glucose meter to check fluctuating

## 2020-09-16 ENCOUNTER — CARE COORDINATION (OUTPATIENT)
Dept: CARE COORDINATION | Age: 65
End: 2020-09-16

## 2020-09-16 ENCOUNTER — TELEPHONE (OUTPATIENT)
Dept: PHARMACY | Facility: CLINIC | Age: 65
End: 2020-09-16

## 2020-09-16 NOTE — CARE COORDINATION
Ambulatory Care Coordination Note  9/16/2020  CM Risk Score: 6  Charlson 10 Year Mortality Risk Score: 100%     ACC: Cindy Sebastian, RN    Summary Note: Spoke to patient, [facility] nurse making a visit   Reviewed meds with patient, reports hes forgetful and doesnt always remember to take them  DM;  [facility] checked his BS; 245  Per patient, he should check BS before meals. But is averaging twice daily    Doesnt follow a specific diet. Averages eating 2 meals daily   Agreeable to dietician referral   Spoke to his [address]. She asked writer to call her later   Patient reports he has his meds on hand  Encouraged calling providers as needed with changes, questions or concerns   CM plan; place referrals for RD, SW and pharmacist   Next call; review BSs and ongoing DM education     Future Appointments   Date Time Provider Jordon Frost   9/23/2020  1:15 PM Lo Willis DPM [facility] Podiatry [facility]   9/30/2020 10:00 AM Fabrizio Jensen MD INFT DISEASE [facility]       Diabetes Assessment    Medic Alert ID:  No  Meal Planning:  Avoidance of concentrated sweets   How often do you test your blood sugar?:  Daily   Do you have barriers with adherence to non-pharmacologic self-management interventions?  (Nutrition/Exercise/Self-Monitoring):  Yes   Have you ever had to go to the ED for symptoms of low blood sugar?:  No       Increase or Decrease trend in Blood Sugars   Do you have hyperglycemia symptoms?:  Yes   Last Blood Sugar Value:  245   Blood Sugar Monitoring Regimen:  Before Meals      ,   COPD Assessment    Does the patient understand envrionmental exposure?:  Yes  Does the patient have a nebulizer?:  Yes  Does the patient use a space with inhaled medications?:  No            Symptoms:      Symptom course:  stable      and   General Assessment    Do you have any symptoms that are causing concern?:  No               Care Coordination Interventions    Program Enrollment:  Rising Risk  Referral from Primary Care Provider:  Yes  Suggested Interventions and Community Resources  Diabetes Education:  [address]:  Completed (Comment: 9/15/20 Yovana Harris 80 )  Meals on Wheels:  Completed  Medication Assistance Program:  In Process (Comment: 9/15/20 referral to Jess Vasques)  Pharmacist:  In Process (Comment: 9/16/20 referral )  Registered Dietician: In Process  Social Work:  In Process  Zone Management Tools:  Completed         Goals Addressed    None         Prior to Admission medications    Medication Sig Start Date End Date Taking?  Authorizing Provider   oxyCODONE-acetaminophen (PERCOCET) 5-325 MG per tablet TK 1 TO 2 TS PO Q 4 H PRN P 9/3/20   Historical Provider, MD   insulin lispro, 1 Unit Dial, (ADMELOG SOLOSTAR) 100 UNIT/ML SOPN Inject 10 Units into the skin 3 times daily  Patient not taking: Reported on 9/14/2020 9/8/20   Indiana Niño DO   dilTIAZem (CARDIZEM) 60 MG tablet TAKE 1 TABLET BY MOUTH FOUR TIMES DAILY 9/6/20 9/6/21  Indiana Niño DO   famotidine (PEPCID) 20 MG tablet TAKE 1 TABLET BY MOUTH TWICE DAILY 9/6/20 9/6/21  Indiana Niño DO   apixaban (ELIQUIS) 5 MG TABS tablet Take 1 tablet by mouth 2 times daily  Patient not taking: Reported on 9/14/2020 9/5/20   Indiana Niño DO   aspirin 81 MG chewable tablet Take 1 tablet by mouth daily 9/5/20   Indiana Niño DO   atorvastatin (LIPITOR) 40 MG tablet Take 1 tablet by mouth daily 9/5/20   Indiana Niño DO   Insulin Pen Needle 32G X 4 MM MISC 1 each by Does not apply route daily 9/5/20   Indiana Niño DO   ciprofloxacin (CIPRO) 500 MG tablet Take 1 tablet by mouth every 12 hours for 25 days 9/3/20 9/28/20  Eunice Alexandre MD   docusate (COLACE, DULCOLAX) 100 MG CAPS Take 100 mg by mouth 2 times daily as needed (constipation) 9/3/20   Eunice Alexandre MD   linezolid (ZYVOX) 600 MG tablet Take 1 tablet by mouth every 12 hours for 25 days 9/3/20 9/28/20  Eunice Alexandre MD   naloxone 4 MG/0.1ML LIQD nasal spray 1 spray by Nasal route as needed for Opioid Reversal  Patient not taking: Reported on 9/14/2020 9/3/20   Edinson Irwin MD   nicotine (NICODERM CQ) 21 MG/24HR Place 1 patch onto the skin daily as needed (if pateint smokes)  Patient not taking: Reported on 9/14/2020 9/3/20   Edinson Irwin MD   polyethylene glycol (GLYCOLAX) 17 g packet Take 17 g by mouth 2 times daily  Patient not taking: Reported on 9/14/2020 9/3/20 10/4/20  Edinson Irwin MD   blood glucose test strips (ASCENSIA AUTODISC VI;ONE TOUCH ULTRA TEST VI) strip Use with associated glucose meter to check fluctuating blood sugars BID 9/2/20   Bryant Dial MD   glucose monitoring kit (FREESTYLE) monitoring kit 1 kit by Does not apply route daily 9/2/20   Edinson Irwin MD   albuterol sulfate HFA (VENTOLIN HFA) 108 (90 Base) MCG/ACT inhaler Inhale 2 puffs into the lungs every 6 hours as needed for Wheezing    Historical Provider, MD   nortriptyline (PAMELOR) 25 MG capsule Take 50 mg by mouth nightly    Historical Provider, MD   Insulin Degludec (TRESIBA FLEXTOUCH) 100 UNIT/ML SOPN Inject 70 Units into the skin nightly    Historical Provider, MD   Lancets MISC 1 each by Does not apply route 2 times daily 7/13/20   Houston Pagan DO   TRUEplus Lancets 30G MISC Inject 1 each into the skin 3 times daily TO CHECK FLUCTUATING BLOOD SUGARS IE HYPERGLYCEMIA 6/19/20   Houston Pagan DO   ipratropium-albuterol (DUONEB) 0.5-2.5 (3) MG/3ML SOLN nebulizer solution Inhale 3 mLs into the lungs every 4 hours (while awake)  Patient not taking: Reported on 9/14/2020 5/28/20   Houston Pagan DO   metFORMIN (GLUCOPHAGE) 500 MG tablet Take 1 tablet by mouth 2 times daily (with meals) 3/9/20   Houston Pagan DO       Future Appointments   Date Time Provider Jordon Frost   9/23/2020  1:15 PM Kamari Hyatt DPM [facility] Podiatry [facility]   9/30/2020 10:00 AM Matthew Galdamez MD INFT DISEASE [facility]

## 2020-09-16 NOTE — TELEPHONE ENCOUNTER
----- Message from Abdelrahman Rosario RN sent at 9/16/2020  2:26 PM EDT -----  Regarding: medication adherence, education and reconciliation  Hello; Patient was enrolled onto care coordination.  Will you please reach out to the him to review adherence, education and a med reconciliation       Thank you  Abdelrahman Rosario RN AC

## 2020-09-17 ENCOUNTER — TELEPHONE (OUTPATIENT)
Dept: FAMILY MEDICINE CLINIC | Age: 65
End: 2020-09-17

## 2020-09-17 RX ORDER — RIVAROXABAN 10 MG/1
10 TABLET, FILM COATED ORAL
Qty: 30 TABLET | Refills: 1 | Status: ON HOLD | OUTPATIENT
Start: 2020-09-17 | End: 2020-11-07 | Stop reason: HOSPADM

## 2020-09-17 NOTE — TELEPHONE ENCOUNTER
shane trammell is asking you to send new script for xarelto 10 mg to [facility] and they are going to try to use a coupon with it.

## 2020-09-17 NOTE — TELEPHONE ENCOUNTER
2nd Attempt Documentation:  2nd attempt to contact this patient regarding the previous message  CLINICAL PHARMACY: REFERRAL  Patient unavailable at the time of call. Left following message on home TAD: please call back at toll-free [phone] option 7 to retrieve previous message. Letter mailed to patient.

## 2020-09-18 ENCOUNTER — CARE COORDINATION (OUTPATIENT)
Dept: CARE COORDINATION | Age: 65
End: 2020-09-18

## 2020-09-21 ENCOUNTER — CARE COORDINATION (OUTPATIENT)
Dept: CARE COORDINATION | Age: 65
End: 2020-09-21

## 2020-09-21 NOTE — CARE COORDINATION
Registered Dietitian Initial Assessment for [address]  September 21, 2020    Initial Referral Reason: Diabetes    Patient Care Team:  Lan Burns DO as PCP - General (Family Medicine)  Lan Burns DO as PCP - [facility]  Janki Lyn MD as Surgeon (Surgical Oncology)  Jt Gomez DPM as Surgeon (Podiatry)  Reed Chao MD as Consulting Physician (Ophthalmology)  Aisha Barron MD as Consulting Physician (Gastroenterology)  [facility]  Quirino Chun RN as [address] [place] [address]  as   Tien Chun RD, LD as Dietitian    Patient Active Problem List   Diagnosis    Type 2 diabetes mellitus with diabetic polyneuropathy, with long-term current use of insulin ([address]    Neuropathic pain, leg    Diabetic polyneuropathy ([address]    Allergic rhinitis    Tobacco dependence    COPD (chronic obstructive pulmonary disease) ([address]    Edentulous    Dysphagia    Lung nodules    Esophageal cancer ([address]    Fracture of humerus, left, closed    Closed fracture of humerus    DM type 2 with diabetic peripheral neuropathy ([address]    COPD without exacerbation ([address]    Dyslipidemia    Gastroesophageal reflux disease    Chronic pain syndrome    Marijuana use    History of colon polyps    Cellulitis of right heel    PVD (peripheral vascular disease) ([address]    Functional diarrhea    Sepsis due to methicillin resistant Staphylococcus aureus (MRSA) ([address]    History of esophageal cancer    Osteomyelitis, chronic, ankle or foot ([address]    Peripheral artery disease ([address]    Other pulmonary embolism without acute cor pulmonale ([address]    Carotid stenosis, asymptomatic, bilateral    Lower limb amputation status    Fx humeral neck, right, closed, initial encounter    Transient hypotension    Constipation due to opioid therapy    Acute kidney injury ([address]    Diabetic ulcer of left midfoot associated with type 2 diabetes mellitus, with fat layer exposed ([address]    Complication of below knee amputation stump ([address]    COPD exacerbation ([address]    Hyponatremia    Pain, phantom limb ([address]    Below-knee amputation of right lower extremity (HCC)    Hyperglycemia    Moderate protein malnutrition (HCC)    Essential hypertension    Uncontrolled type 2 diabetes mellitus with hyperglycemia ([address]    History of pulmonary embolism - 2017    Atelectasis    Uncontrolled type 2 diabetes mellitus with foot ulcer (HCC)    Azotemia    Anemia, normocytic normochromic    Osteomyelitis of fourth phalange of left foot (HCC)    Drug-induced constipation    Osteomyelitis (HCC)    Diabetic foot infection ([address]    Noncompliance       Current Outpatient Medications   Medication Sig Dispense Refill    rivaroxaban (XARELTO) 10 MG TABS tablet Take 1 tablet by mouth daily (with breakfast) 30 tablet 1    oxyCODONE-acetaminophen (PERCOCET) 5-325 MG per tablet TK 1 TO 2 TS PO Q 4 H PRN P      insulin lispro, 1 Unit Dial, (ADMELOG SOLOSTAR) 100 UNIT/ML SOPN Inject 10 Units into the skin 3 times daily (Patient not taking: Reported on 9/14/2020) 5 pen 5    dilTIAZem (CARDIZEM) 60 MG tablet TAKE 1 TABLET BY MOUTH FOUR TIMES DAILY 360 tablet 11    famotidine (PEPCID) 20 MG tablet TAKE 1 TABLET BY MOUTH TWICE DAILY 180 tablet 11    apixaban (ELIQUIS) 5 MG TABS tablet Take 1 tablet by mouth 2 times daily (Patient not taking: Reported on 9/14/2020) 180 tablet 1    atorvastatin (LIPITOR) 40 MG tablet Take 1 tablet by mouth daily 90 tablet 3    Insulin Pen Needle 32G X 4 MM MISC 1 each by Does not apply route daily 120 each 3    ciprofloxacin (CIPRO) 500 MG tablet Take 1 tablet by mouth every 12 hours for 25 days 50 tablet 0    docusate (COLACE, DULCOLAX) 100 MG CAPS Take 100 mg by mouth 2 times daily as needed (constipation) 60 capsule 0    linezolid (ZYVOX) 600 MG tablet Take 1 tablet by mouth every 12 hours for 25 days 50 tablet discussed- list of snacks will be mailed to patient's home. 2. Patient will eat 3 meals daily spaced every 4-5 hours. Discussed importance of not going too long between meals and having a snack if going too long. Patient admits he doesn't get up until late until 1-3pm. most days not eating breakfast or consistently throughout the day. Goal is 3 meals daily spaced every 4-5 hours. 3. Reviewed using plate method at meals and working on increasing non-starchy vegetables and lean protein sources. We focused on increasing protein sources again due to wounds. Encouraged to increase lean meats, eggs, cottage cheese, nut/seeds.  Reviewed what non-starchy vegetables are and encouraged patient to make 1/2  plate non-starchy vegetables, 1/4 lean protein, 1/4 carbs at meals. Foods from each category reviewed along with serving size. 4. Discussed good sources of vitamin A, C and zinc for wound healing. Patient encouraged to eat eggs, broccoli, spinach, peppers, ect to make sure he is getting these vitamins/mineral. Patient will be sent a list of foods to increase that contain these vitamins/mineral.     Nutrition Intervention Need:  Initial/Brief:  Comprehensive Nutrition Education: X  Coordination of other care during nutrition care:    Monitoring and Evaluation:  Ability to plan meals/snacks: X  Ability to select healthy foods/meals: X  Food and Nutrition Knowledge: X  Self Monitoring:  Physical Activity:  Energy intake:  Fluid/beverage intake:  Fat/chol intake:  Carb intake: X  Other:    Plan:  1. Will continue to educate patient on reading food labels, portion control, plate method, carb counting, low carb snacking, importance of meal pattern, diabetic friendly meal ideas and nutrition for wound healing. Patient will be provided/mailed nutrition information on all the above discussed. 2. Will follow up with patient in 2-3 weeks to review nutrition information and answer questions.     OLGA Spangler

## 2020-09-21 NOTE — CARE COORDINATION
Ambulatory Care Coordination Note  9/21/2020  CM Risk Score: 6  Charlson 10 Year Mortality Risk Score: 100%     ACC: Jose Wang RN    Reviewed patient with JONG/ Santino Gonzalez; patient reports problems sleeping and BS's fluctuating.        Summary note;  Called patient, reports it's not a good time to talk and will call back

## 2020-09-22 ENCOUNTER — CARE COORDINATION (OUTPATIENT)
Dept: CARE COORDINATION | Age: 65
End: 2020-09-22

## 2020-09-22 NOTE — CARE COORDINATION
Diane Simpson is a 70-year-old male who is a patient of Dr. Perla Casiano. Malone Skiff sent social service a referral to assist patient with resources.  called Lalito Purdy.  explained her role and wanting to help Lalito Purdy with resources. Lalito Purdy replied \"great can you cancel my appointment tomorrow with podiatry. \"   informed Lalito Purdy she can provide Lalito Purdy with the number to call to cancel appointment. Lalito Purdy requested podiatry number at [facility] because its closer to him.  obtained numbers and called Lalito Purdy back. Starkey Kid reports that he did not have a pen and declined a text message. Lalito Purdy replied \"I guess I can try to remember the number\".  provided Lalito Purdy with number. Plan:   Lalito Purdy will call and cancel his  appointment tomorrow. Lalito Purdy will reschedule appointment.  will call Lalito Purdy back to  discuss resources.

## 2020-09-23 ENCOUNTER — OFFICE VISIT (OUTPATIENT)
Dept: PODIATRY | Age: 65
End: 2020-09-23
Payer: MEDICARE

## 2020-09-23 VITALS
HEIGHT: 72 IN | WEIGHT: 157 LBS | HEART RATE: 65 BPM | BODY MASS INDEX: 21.26 KG/M2 | SYSTOLIC BLOOD PRESSURE: 140 MMHG | DIASTOLIC BLOOD PRESSURE: 90 MMHG

## 2020-09-23 PROCEDURE — G8420 CALC BMI NORM PARAMETERS: HCPCS | Performed by: STUDENT IN AN ORGANIZED HEALTH CARE EDUCATION/TRAINING PROGRAM

## 2020-09-23 PROCEDURE — 2022F DILAT RTA XM EVC RTNOPTHY: CPT | Performed by: STUDENT IN AN ORGANIZED HEALTH CARE EDUCATION/TRAINING PROGRAM

## 2020-09-23 PROCEDURE — G8427 DOCREV CUR MEDS BY ELIG CLIN: HCPCS | Performed by: STUDENT IN AN ORGANIZED HEALTH CARE EDUCATION/TRAINING PROGRAM

## 2020-09-23 PROCEDURE — 99213 OFFICE O/P EST LOW 20 MIN: CPT | Performed by: STUDENT IN AN ORGANIZED HEALTH CARE EDUCATION/TRAINING PROGRAM

## 2020-09-23 PROCEDURE — 4004F PT TOBACCO SCREEN RCVD TLK: CPT | Performed by: STUDENT IN AN ORGANIZED HEALTH CARE EDUCATION/TRAINING PROGRAM

## 2020-09-23 PROCEDURE — 3017F COLORECTAL CA SCREEN DOC REV: CPT | Performed by: STUDENT IN AN ORGANIZED HEALTH CARE EDUCATION/TRAINING PROGRAM

## 2020-09-23 PROCEDURE — 1111F DSCHRG MED/CURRENT MED MERGE: CPT | Performed by: STUDENT IN AN ORGANIZED HEALTH CARE EDUCATION/TRAINING PROGRAM

## 2020-09-23 PROCEDURE — 99212 OFFICE O/P EST SF 10 MIN: CPT | Performed by: STUDENT IN AN ORGANIZED HEALTH CARE EDUCATION/TRAINING PROGRAM

## 2020-09-23 PROCEDURE — 3046F HEMOGLOBIN A1C LEVEL >9.0%: CPT | Performed by: STUDENT IN AN ORGANIZED HEALTH CARE EDUCATION/TRAINING PROGRAM

## 2020-09-23 PROCEDURE — 99212 OFFICE O/P EST SF 10 MIN: CPT

## 2020-09-23 NOTE — PROGRESS NOTES
Patient instructed to remove shoes and socks, instructed to sit in exam chair. Current PCP name is Lalo Moe and date of last visit 9/14/20.  Do you have a follow up visit scheduled?   no   If yes the date is

## 2020-09-23 NOTE — PROGRESS NOTES
tablet by mouth daily 9/5/20  Yes Cheryle Zhou DO   Insulin Pen Needle 32G X 4 MM MISC 1 each by Does not apply route daily 9/5/20  Yes Cheryle Zhou DO   ciprofloxacin (CIPRO) 500 MG tablet Take 1 tablet by mouth every 12 hours for 25 days 9/3/20 9/28/20 Yes Manuel Sharif MD   docusate (COLACE, DULCOLAX) 100 MG CAPS Take 100 mg by mouth 2 times daily as needed (constipation) 9/3/20  Yes Manuel Sharif MD   linezolid (ZYVOX) 600 MG tablet Take 1 tablet by mouth every 12 hours for 25 days 9/3/20 9/28/20 Yes Manuel Sharif MD   naloxone 4 MG/0.1ML LIQD nasal spray 1 spray by Nasal route as needed for Opioid Reversal 9/3/20  Yes Manuel Sharif MD   nicotine (NICODERM CQ) 21 MG/24HR Place 1 patch onto the skin daily as needed (if pateint smokes) 9/3/20  Yes Manuel Sharif MD   polyethylene glycol (GLYCOLAX) 17 g packet Take 17 g by mouth 2 times daily 9/3/20 10/4/20 Yes Manuel Sharif MD   blood glucose test strips (ASCENSIA AUTODISC VI;ONE TOUCH ULTRA TEST VI) strip Use with associated glucose meter to check fluctuating blood sugars BID 9/2/20  Yes Manuel Sharif MD   glucose monitoring kit (FREESTYLE) monitoring kit 1 kit by Does not apply route daily 9/2/20  Yes Manuel Sharif MD   albuterol sulfate HFA (VENTOLIN HFA) 108 (90 Base) MCG/ACT inhaler Inhale 2 puffs into the lungs every 6 hours as needed for Wheezing   Yes Historical Provider, MD   nortriptyline (PAMELOR) 25 MG capsule Take 50 mg by mouth nightly   Yes Historical Provider, MD   Insulin Degludec (TRESIBA FLEXTOUCH) 100 UNIT/ML SOPN Inject 70 Units into the skin nightly   Yes Historical Provider, MD   Lancets MISC 1 each by Does not apply route 2 times daily 7/13/20  Yes Cheryle Zhou DO   TRUEplus Lancets 30G MISC Inject 1 each into the skin 3 times daily TO CHECK FLUCTUATING BLOOD SUGARS IE HYPERGLYCEMIA 6/19/20  Yes Cheryle Zhou DO   ipratropium-albuterol (DUONEB) 0.5-2.5 (3) MG/3ML SOLN nebulizer solution Inhale 3 mLs into the lungs every 4 hours (while awake) 5/28/20  Yes Ck King DO   metFORMIN (GLUCOPHAGE) 500 MG tablet Take 1 tablet by mouth 2 times daily (with meals) 3/9/20  Yes Ck King DO       Objective     Vitals:    09/23/20 1309   BP: (!) 140/90   Pulse: 65       Lab Results   Component Value Date    LABA1C 13.0 07/08/2020       Physical Exam:  General:  Alert and oriented x3. In no acute distress. Focused Left Lower Extremity Physical Exam:    Vascular: DP and PT pulses are non palpable. DP pulses were non audible on doppler, PT faintly audible monophasic. Temperature is cool to cool from tibia to foot. CFT <3 seconds to all digits. Hair growth is absent to the level of the digits. Neuro: Saph/sural/SP/DP/plantar sensation absent to light touch. Protective sensation is absent    Musculoskeletal: EHL/FHL/GS/TA gross motor intact. No tenderness to palpation of the TMA site. Dermatologic: Sutures are grossly intact with small wound dehiscence noted to the middle aspect of the incision. This wound measures approximately 1.0cm x 0.75cm x 1.0 cm with a granular base. This wound has no periwound erythema, no drainage, no foul odor, no tracking or undermining or any other signs of infection. Assessment   Zoraida Edmondson is a 60 y.o. male with     Diagnosis Orders   1. S/P transmetatarsal amputation of foot, right ([address]     2. Wound dehiscence, surgical, subsequent encounter     3. PVD (peripheral vascular disease) ([address]     4. Below-knee amputation of right lower extremity ([address]     5. Type 2 diabetes mellitus with diabetic polyneuropathy, with long-term current use of insulin ([address]          Plan   · Patient examined and evaluated  · Diagnosis and treatment options discussed in detail  · Sutures were removed without incident and steristrips placed for the incision site. · Aquacel silver placed into the wound, and covered with a 4x4 gauze, and wrapped with kerlix.   · Home healthcare to change dressing daily. · Patient educated on the signs of infection and to go to ED if any symptoms begin. · Patient to RTC in 1 week. Stressed importance of following up to monitor the amputation site. · Please, call the office with any questions or concerns     No orders of the defined types were placed in this encounter. No orders of the defined types were placed in this encounter.       Lucio Luciano DPM   Podiatric Medicine & Surgery   9/23/2020 at 2:00 PM

## 2020-09-23 NOTE — PROGRESS NOTES
[facility] IS NOT TAKING NEW PATIENTS RIGHT NOW. CANCELLED REFERRAL AND SENT NEW REFERRAL TO [facility]. CALLED PT TO NOTIFY HIM OF THE CHANGES. PT VERBALLY UNDERSTOOD.

## 2020-09-24 ENCOUNTER — TELEPHONE (OUTPATIENT)
Dept: PODIATRY | Age: 65
End: 2020-09-24

## 2020-09-24 ENCOUNTER — TELEPHONE (OUTPATIENT)
Dept: FAMILY MEDICINE CLINIC | Age: 65
End: 2020-09-24
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
## 2020-09-28 ENCOUNTER — CARE COORDINATION (OUTPATIENT)
Dept: CARE COORDINATION | Age: 65
End: 2020-09-28

## 2020-09-28 NOTE — CARE COORDINATION
Attempted outreach for care management needs.  Unable to contact, left VM with return contact ph [phone]

## 2020-09-28 NOTE — CARE COORDINATION
attempted to contact Dm Villanueva to discuss social issues.  left message with contact information and requested a call back to [phone].

## 2020-10-06 ENCOUNTER — CARE COORDINATION (OUTPATIENT)
Dept: CARE COORDINATION | Age: 65
End: 2020-10-06

## 2020-10-07 ENCOUNTER — OFFICE VISIT (OUTPATIENT)
Dept: PODIATRY | Age: 65
End: 2020-10-07
Payer: MEDICARE

## 2020-10-07 VITALS
HEIGHT: 72 IN | SYSTOLIC BLOOD PRESSURE: 131 MMHG | HEART RATE: 84 BPM | WEIGHT: 165 LBS | BODY MASS INDEX: 22.35 KG/M2 | DIASTOLIC BLOOD PRESSURE: 77 MMHG

## 2020-10-07 PROCEDURE — 99213 OFFICE O/P EST LOW 20 MIN: CPT | Performed by: STUDENT IN AN ORGANIZED HEALTH CARE EDUCATION/TRAINING PROGRAM

## 2020-10-07 PROCEDURE — G8420 CALC BMI NORM PARAMETERS: HCPCS | Performed by: STUDENT IN AN ORGANIZED HEALTH CARE EDUCATION/TRAINING PROGRAM

## 2020-10-07 PROCEDURE — 2022F DILAT RTA XM EVC RTNOPTHY: CPT | Performed by: STUDENT IN AN ORGANIZED HEALTH CARE EDUCATION/TRAINING PROGRAM

## 2020-10-07 PROCEDURE — 99212 OFFICE O/P EST SF 10 MIN: CPT

## 2020-10-07 PROCEDURE — G8484 FLU IMMUNIZE NO ADMIN: HCPCS | Performed by: STUDENT IN AN ORGANIZED HEALTH CARE EDUCATION/TRAINING PROGRAM

## 2020-10-07 PROCEDURE — G8427 DOCREV CUR MEDS BY ELIG CLIN: HCPCS | Performed by: STUDENT IN AN ORGANIZED HEALTH CARE EDUCATION/TRAINING PROGRAM

## 2020-10-07 PROCEDURE — 3046F HEMOGLOBIN A1C LEVEL >9.0%: CPT | Performed by: STUDENT IN AN ORGANIZED HEALTH CARE EDUCATION/TRAINING PROGRAM

## 2020-10-07 PROCEDURE — 3017F COLORECTAL CA SCREEN DOC REV: CPT | Performed by: STUDENT IN AN ORGANIZED HEALTH CARE EDUCATION/TRAINING PROGRAM

## 2020-10-07 PROCEDURE — 4004F PT TOBACCO SCREEN RCVD TLK: CPT | Performed by: STUDENT IN AN ORGANIZED HEALTH CARE EDUCATION/TRAINING PROGRAM
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
## 2020-10-07 NOTE — CARE COORDINATION
attempted to contact [facility].  left message with contact information and requested a call back to [phone].

## 2020-10-09 ENCOUNTER — CARE COORDINATION (OUTPATIENT)
Dept: CARE COORDINATION | Age: 65
End: 2020-10-09

## 2020-10-09 ENCOUNTER — TELEPHONE (OUTPATIENT)
Dept: PODIATRY | Age: 65
End: 2020-10-09

## 2020-10-09 SDOH — HEALTH STABILITY: MENTAL HEALTH: HOW OFTEN DO YOU HAVE A DRINK CONTAINING ALCOHOL?: PATIENT DECLINED

## 2020-10-09 SDOH — ECONOMIC STABILITY: FOOD INSECURITY: WITHIN THE PAST 12 MONTHS, YOU WORRIED THAT YOUR FOOD WOULD RUN OUT BEFORE YOU GOT MONEY TO BUY MORE.: PATIENT DECLINED

## 2020-10-09 SDOH — SOCIAL STABILITY: SOCIAL NETWORK: ARE YOU MARRIED, WIDOWED, DIVORCED, SEPARATED, NEVER MARRIED, OR LIVING WITH A PARTNER?: PATIENT DECLINED

## 2020-10-09 SDOH — HEALTH STABILITY: PHYSICAL HEALTH
ON AVERAGE, HOW MANY DAYS PER WEEK DO YOU ENGAGE IN MODERATE TO STRENUOUS EXERCISE (LIKE A BRISK WALK)?: PATIENT DECLINED

## 2020-10-09 SDOH — SOCIAL STABILITY: SOCIAL NETWORK: HOW OFTEN DO YOU GET TOGETHER WITH FRIENDS OR RELATIVES?: PATIENT DECLINED

## 2020-10-09 SDOH — ECONOMIC STABILITY: TRANSPORTATION INSECURITY
IN THE PAST 12 MONTHS, HAS LACK OF TRANSPORTATION KEPT YOU FROM MEETINGS, WORK, OR FROM GETTING THINGS NEEDED FOR DAILY LIVING?: PATIENT DECLINED

## 2020-10-09 SDOH — SOCIAL STABILITY: SOCIAL NETWORK: HOW OFTEN DO YOU ATTENT MEETINGS OF THE CLUB OR ORGANIZATION YOU BELONG TO?: PATIENT DECLINED

## 2020-10-09 SDOH — HEALTH STABILITY: MENTAL HEALTH
STRESS IS WHEN SOMEONE FEELS TENSE, NERVOUS, ANXIOUS, OR CAN'T SLEEP AT NIGHT BECAUSE THEIR MIND IS TROUBLED. HOW STRESSED ARE YOU?: PATIENT DECLINED

## 2020-10-09 SDOH — HEALTH STABILITY: PHYSICAL HEALTH: ON AVERAGE, HOW MANY MINUTES DO YOU ENGAGE IN EXERCISE AT THIS LEVEL?: PATIENT DECLINED

## 2020-10-09 SDOH — SOCIAL STABILITY: SOCIAL NETWORK: IN A TYPICAL WEEK, HOW MANY TIMES DO YOU TALK ON THE PHONE WITH FAMILY, FRIENDS, OR NEIGHBORS?: PATIENT DECLINED

## 2020-10-09 SDOH — HEALTH STABILITY: MENTAL HEALTH: HOW MANY STANDARD DRINKS CONTAINING ALCOHOL DO YOU HAVE ON A TYPICAL DAY?: PATIENT DECLINED

## 2020-10-09 SDOH — ECONOMIC STABILITY: TRANSPORTATION INSECURITY
IN THE PAST 12 MONTHS, HAS THE LACK OF TRANSPORTATION KEPT YOU FROM MEDICAL APPOINTMENTS OR FROM GETTING MEDICATIONS?: PATIENT DECLINED

## 2020-10-09 SDOH — SOCIAL STABILITY: SOCIAL NETWORK: HOW OFTEN DO YOU ATTEND CHURCH OR RELIGIOUS SERVICES?: PATIENT DECLINED

## 2020-10-09 SDOH — SOCIAL STABILITY: SOCIAL NETWORK
DO YOU BELONG TO ANY CLUBS OR ORGANIZATIONS SUCH AS CHURCH GROUPS UNIONS, FRATERNAL OR ATHLETIC GROUPS, OR SCHOOL GROUPS?: PATIENT DECLINED

## 2020-10-09 SDOH — ECONOMIC STABILITY: INCOME INSECURITY: HOW HARD IS IT FOR YOU TO PAY FOR THE VERY BASICS LIKE FOOD, HOUSING, MEDICAL CARE, AND HEATING?: SOMEWHAT HARD

## 2020-10-09 SDOH — ECONOMIC STABILITY: FOOD INSECURITY: WITHIN THE PAST 12 MONTHS, THE FOOD YOU BOUGHT JUST DIDN'T LAST AND YOU DIDN'T HAVE MONEY TO GET MORE.: PATIENT DECLINED

## 2020-10-09 NOTE — TELEPHONE ENCOUNTER
Yes he can come in and have either tresiba, tudjeo or lantus /levemir as they are all long acting , which ever we have .  Thanks

## 2020-10-09 NOTE — CARE COORDINATION
Ambulatory Care Coordination Note  10/9/2020  CM Risk Score: 6  Charlson 10 Year Mortality Risk Score: 100%     ACC: Donnell Fitzgerald, HA    Summary Note: Spoke to patient, feels ok   Reports wound from right foot trans metatarsal amputation is healing fine. And has daily dressing changes  DM; BS averaging less than 120. Is non-adherent, testing BS, and  taking insulin and some other meds   Non adherent checking BS regularly. Reports, checks BS maybe 1 -2 times daily   Reports, hes out of [place] and can not afford it. He asks if office has samples   Has Humalog on hand, but hasnt been using it  Paperwork for Patient Assistance programs have beem sent to him to fill out for insulin and blood thinners  During the conversation, patient abruptly tells CM he cant talk. , then hangs up and disconnects the call   Writer has previously made multiple attempts to contact patient   during conversations he can be difficult to engage. CM; next outreach; DM education. Diabetes Assessment    Medic Alert ID:  No  Meal Planning:  Avoidance of concentrated sweets   How often do you test your blood sugar?:  Daily   Do you have barriers with adherence to non-pharmacologic self-management interventions?  (Nutrition/Exercise/Self-Monitoring):  Yes   Have you ever had to go to the ED for symptoms of low blood sugar?:  No       No patient-reported symptoms   Blood Sugar Monitoring Regimen:  Once a Day      ,   COPD Assessment    Does the patient understand envrionmental exposure?:  Yes  Does the patient have a nebulizer?:  Yes  Does the patient use a space with inhaled medications?:  No     No patient-reported symptoms         Symptoms:          and   General Assessment    Do you have any symptoms that are causing concern?:  No           Care Coordination Interventions    Program Enrollment:  Rising Risk  Referral from Primary Care Provider:  Yes  Suggested Interventions and Community Resources  Diabetes Education: nightly   Yes Historical Provider, MD   Lancets MISC 1 each by Does not apply route 2 times daily 7/13/20  Yes Denice Grain, DO   TRUEplus Lancets 30G MISC Inject 1 each into the skin 3 times daily TO CHECK FLUCTUATING BLOOD SUGARS IE HYPERGLYCEMIA 6/19/20  Yes Denice Grain, DO   ipratropium-albuterol (DUONEB) 0.5-2.5 (3) MG/3ML SOLN nebulizer solution Inhale 3 mLs into the lungs every 4 hours (while awake) 5/28/20  Yes Denice Grain, DO   metFORMIN (GLUCOPHAGE) 500 MG tablet Take 1 tablet by mouth 2 times daily (with meals) 3/9/20  Yes Denice Grain, DO   oxyCODONE-acetaminophen (PERCOCET) 5-325 MG per tablet TK 1 TO 2 TS PO Q 4 H PRN P 9/3/20   Historical Provider, MD   insulin lispro, 1 Unit Dial, (ADMELOG SOLOSTAR) 100 UNIT/ML SOPN Inject 10 Units into the skin 3 times daily  Patient not taking: Reported on 10/9/2020 9/8/20   Denice Arora, DO   docusate (COLACE, DULCOLAX) 100 MG CAPS Take 100 mg by mouth 2 times daily as needed (constipation)  Patient not taking: Reported on 10/9/2020 9/3/20   Douglas Mckeon MD   naloxone 4 MG/0.1ML LIQD nasal spray 1 spray by Nasal route as needed for Opioid Reversal 9/3/20   Douglas Mckeon MD   nicotine (NICODERM CQ) 21 MG/24HR Place 1 patch onto the skin daily as needed (if pateint smokes)  Patient not taking: Reported on 10/9/2020 9/3/20   Douglas Mckeon MD   blood glucose test strips (ASCENSIA AUTODISC VI;ONE TOUCH ULTRA TEST VI) strip Use with associated glucose meter to check fluctuating blood sugars BID 9/2/20   Douglas Mckeon MD   glucose monitoring kit (FREESTYLE) monitoring kit 1 kit by Does not apply route daily 9/2/20   Douglas Mckeon MD   nortriptyline (PAMELOR) 25 MG capsule Take 50 mg by mouth nightly    Historical Provider, MD       Future Appointments   Date Time Provider Jordon Frost   10/14/2020  1:15 PM Abbey Bryant DPM [facility]   10/19/2020  3:15 PM Caroline Brown MD INFT DISEASE Cel[facility] Penny

## 2020-10-12 ENCOUNTER — CARE COORDINATION (OUTPATIENT)
Dept: CARE COORDINATION | Age: 65
End: 2020-10-12

## 2020-10-12 ENCOUNTER — TELEPHONE (OUTPATIENT)
Dept: PODIATRY | Age: 65
End: 2020-10-12

## 2020-10-12 ENCOUNTER — TELEPHONE (OUTPATIENT)
Dept: FAMILY MEDICINE CLINIC | Age: 65
End: 2020-10-12

## 2020-10-12 NOTE — TELEPHONE ENCOUNTER
Rosas Echeverria called and she states that patient needs a new meter, the current one he has took 3 different readings all within 5 mins - 323, 391, 370    Can you order him a new one?

## 2020-10-12 NOTE — TELEPHONE ENCOUNTER
Lima from [facility] called and stated she has left several msgs on nurse's line last week regarding patient's dressing changes and no one has called her back. Please advise and call Lima back at [phone].

## 2020-10-12 NOTE — CARE COORDINATION
Nutrition Care Coordinator Follow-Up visit:    Food Recall: eating 2-3 meals/d    Activity Level:  Sedentary: X  Lightly Active: Moderately Active:  Very Active:    Adult BMI:  Underweight (below 18.5)  Normal Weight (18.5-24. 9) X  Overweight (25-29. 9)  Obese (30-39. 9)  Morbidly Obese (>40)    Weight Change: no change    Plan:  Plan was established with patient:  Increase dietary fiber by consuming whole grains, fruits and vegetables: X  Limit dietary cholesterol to >200mg/day: Increase water intake:  Avoid added sugar: X  Avoid sweetened beverages: X  Choose lean meats: X      Monitoring: Will monitor weight:  Will monitor adherence to meal plan: Will monitor adherence to exercise plan: Will monitor HGA1c: X    Handouts Provided :  Low Carb snacking: X  Carb counting /individual meal plan:  Portion Control: X  Food Labels: X  Physical Activity:  Low Fat/Cholesterol:  Hypo/Hyperglycemia:  Calorie Controlled Meal Plan:    Goals: Increase water consumption to 8oz. 6-8 times daily:  Manage blood sugars by consuming 3 meals spaced every 4-5 hours with 2-3 snacks daily: discussed  Increase fiber and decrease fat intake by consuming 1-2 fruit servings and 2-3 vegetable servings per day. Increase physical activity by:  Consume less than 2,000mg of sodium/day  Avoid consumption of sweetened beverages and added sugar by reading food labels: discussed  Monitor blood sugars by using meter to check blood glucose before morning meal and 2 hours after a meal daily: Pt. Could not give me readings- encouraged him to check sugars daily as instructed  Decrease risk of coronary heart disease by consuming fish that contains omega-3 fatty acids at least twice a week, avoiding partially hydrogenated oil/trans fats and limiting saturated fat intake by reading food labels:discussed    Patient goals set:  1.  Reviewed what foods contain carbohydrate, what a serving size is of carbs and how to measure servings out to limit carbohydrates at meals and snacks.  Goal is 60gms of carb/meal, 15-30gms/snack. Low carb snacking reviewed- encouraged patient to use low carb snack list provided when choosing snacks. 2. Reviewed importance of not going too long between meals and having a snack if going too long. Patient admits he doesn't get up until late until 1-3pm. most days not eating breakfast or consistently throughout the day. Goal is 3 meals daily spaced every 4-5 hours. 3. Reviewed using plate method at meals and working on increasing non-starchy vegetables and lean protein sources. Reviewed increasing protein sources again due to wounds. Reviewed protein sources- lean meats, eggs, cottage cheese, nut/seeds.  Reviewed what non-starchy vegetables are and encouraged patient to make 1/2  plate non-starchy vegetables, 1/4 lean protein, 1/4 carbs at meals. Foods from each category reviewed along with serving size. 4. Reviewed good sources of vitamin A, C and zinc for wound healing. Patient encouraged to eat eggs, broccoli, spinach, peppers, ect to make sure he is getting these vitamins/mineral.   Patient relays he got the nutrition information I sent him but has not read through it yet- encouraged him to. HE relays he is not at home and could not give her BS readings, discussed making sure he is checking it regularly as instructed. Patient did not talk long, tried to get off the phone several times while I asked him questions. Will follow up in 3-4 weeks to review, encouraged compliance and answer questions.     Kalyan Garza

## 2020-10-12 NOTE — TELEPHONE ENCOUNTER
Lima a home health nurse called stating patient is very non compliant. Missed a lot of appointments. She had him sign a behavorial contract stating if he misses lots of appointments for dressing changes they can discharge him in 30 days.  AUDELIA

## 2020-10-13 ENCOUNTER — CARE COORDINATION (OUTPATIENT)
Dept: CARE COORDINATION | Age: 65
End: 2020-10-13

## 2020-10-13 NOTE — CARE COORDINATION
Ambulatory Care Coordination Note  10/13/2020  CM Risk Score: 6  Charlson 10 Year Mortality Risk Score: 100%     ACC: Donnell Fitzgerald RN    Summary Note: Spoke to patient, feels ok   Reports, rt foot wound is ok. Prefers not to provide more details. Reports, [facility] nurses showing him how to do his own dressing changes   He has been noncompliant with keeping [place] [address] appointments and daily dressing changes   Non adherent checking BS regularly. Sometimes checks 1-2 times daily   Reports, he picked up [address] samples from the office yesterday  Hasnt been using Humalog   Hes difficult to engage. Hes eating lunch and quickly ends call   CM plan; next call review BSs;  med adherence, DM education       Diabetes Assessment    Medic Alert ID:  No  Meal Planning:  Avoidance of concentrated sweets   How often do you test your blood sugar?:  Other (Comment: 1- 2 times daily )   Do you have barriers with adherence to non-pharmacologic self-management interventions?  (Nutrition/Exercise/Self-Monitoring):  Yes   Have you ever had to go to the ED for symptoms of low blood sugar?:  No       No patient-reported symptoms   Do you have hyperglycemia symptoms?:  No   Do you have hypoglycemia symptoms?:  No      ,   COPD Assessment    Does the patient understand envrionmental exposure?:  Yes  Does the patient have a nebulizer?:  Yes  Does the patient use a space with inhaled medications?:  No            Symptoms:      Symptom course:  stable  Increase use of rapid acting/rescue inhaled medications?:  No  Self Monitoring - SaO2:  No      and   General Assessment    Do you have any symptoms that are causing concern?:  No             Care Coordination Interventions    Program Enrollment:  Rising Risk  Referral from Primary Care Provider:  Yes  Suggested Interventions and Community Resources  Diabetes Education:  Declined  Fall Risk Prevention:  Completed  Home Health Services:  Completed (Comment: 9/15/20 [facility] )  Meals on Wheels: Completed  Medication Assistance Program:  Completed (Comment: 9/15/20 referral to [facility])  Pharmacist:  Completed (Comment: 9/16/20 referral )  Registered Dietician:  Completed  Social Work:  Completed  Zone Management Tools:  Completed         Goals Addressed                 This Visit's Progress     Conditions and Symptoms   Worsening     I will keep my appointment or reschedule if I have to cancel. I will notify my provider of any barriers to my plan of care. I will follow my Zone Management tool to seek urgent or emergent care. I will notify my provider of any symptoms that indicate a worsening of my condition. Barriers: lack of motivation, overwhelmed by complexity of regimen, and lack of education  Plan for overcoming my barriers: education, care coordination   Confidence: 7/10  Anticipated Goal Completion Date: 1/15/20              Prior to Admission medications    Medication Sig Start Date End Date Taking?  Authorizing Provider   rivaroxaban (XARELTO) 10 MG TABS tablet Take 1 tablet by mouth daily (with breakfast) 9/17/20   Manda Rodríguez DO   oxyCODONE-acetaminophen (PERCOCET) 5-325 MG per tablet TK 1 TO 2 TS PO Q 4 H PRN P 9/3/20   Historical Provider, MD   insulin lispro, 1 Unit Dial, (ADMELOG SOLOSTAR) 100 UNIT/ML SOPN Inject 10 Units into the skin 3 times daily  Patient not taking: Reported on 10/9/2020 9/8/20   Manda Rodríguez DO   dilTIAZem (CARDIZEM) 60 MG tablet TAKE 1 TABLET BY MOUTH FOUR TIMES DAILY 9/6/20 9/6/21  Manda Rodríguez DO   famotidine (PEPCID) 20 MG tablet TAKE 1 TABLET BY MOUTH TWICE DAILY 9/6/20 9/6/21  Manda Rodríguez DO   apixaban (ELIQUIS) 5 MG TABS tablet Take 1 tablet by mouth 2 times daily 9/5/20   Manda Rodríguez DO   atorvastatin (LIPITOR) 40 MG tablet Take 1 tablet by mouth daily 9/5/20   Manda Rodríguez DO   Insulin Pen Needle 32G X 4 MM MISC 1 each by Does not apply route daily 9/5/20   Manda Rodríguez DO   docusate (COLACE, DULCOLAX) 100 MG CAPS Take 100 mg by mouth 2 times daily as needed (constipation)  Patient not taking: Reported on 10/9/2020 9/3/20   Joycelyn Torres MD   naloxone 4 MG/0.1ML LIQD nasal spray 1 spray by Nasal route as needed for Opioid Reversal 9/3/20   Joycelyn Torres MD   nicotine (NICODERM CQ) 21 MG/24HR Place 1 patch onto the skin daily as needed (if pateint smokes)  Patient not taking: Reported on 10/9/2020 9/3/20   Joycelyn Torres MD   blood glucose test strips (ASCENSIA AUTODISC VI;ONE TOUCH ULTRA TEST VI) strip Use with associated glucose meter to check fluctuating blood sugars BID 9/2/20   Joycelyn Torres MD   glucose monitoring kit (FREESTYLE) monitoring kit 1 kit by Does not apply route daily 9/2/20   Joycelyn Torres MD   albuterol sulfate HFA (VENTOLIN HFA) 108 (90 Base) MCG/ACT inhaler Inhale 2 puffs into the lungs every 6 hours as needed for Wheezing    Historical Provider, MD   nortriptyline (PAMELOR) 25 MG capsule Take 50 mg by mouth nightly    Historical Provider, MD   Insulin Degludec (TRESIBA FLEXTOUCH) 100 UNIT/ML SOPN Inject 70 Units into the skin nightly    Historical Provider, MD   Lancets MISC 1 each by Does not apply route 2 times daily 7/13/20   Abeba Vincent, DO   TRUEplus Lancets 30G MISC Inject 1 each into the skin 3 times daily TO CHECK FLUCTUATING BLOOD SUGARS IE HYPERGLYCEMIA 6/19/20   Abeba Vincent, DO   ipratropium-albuterol (DUONEB) 0.5-2.5 (3) MG/3ML SOLN nebulizer solution Inhale 3 mLs into the lungs every 4 hours (while awake) 5/28/20   Abeba Vincent, DO   metFORMIN (GLUCOPHAGE) 500 MG tablet Take 1 tablet by mouth 2 times daily (with meals) 3/9/20   Abeba Vincent, DO       Future Appointments   Date Time Provider Jordon Frost   10/14/2020  1:15 PM Maren Sahu DPM [address] [id number]   10/19/2020  3:15 PM [address], [place] INFT DISEASE [address] [facility]

## 2020-10-14 ENCOUNTER — CARE COORDINATION (OUTPATIENT)
Dept: CARE COORDINATION | Age: 65
End: 2020-10-14

## 2020-10-14 NOTE — CARE COORDINATION
Received VM from patient    Returned patient's call.  No answer, left VM with return contact ph [phone]

## 2020-10-21 ENCOUNTER — CARE COORDINATION (OUTPATIENT)
Dept: CARE COORDINATION | Age: 65
End: 2020-10-21

## 2020-10-21 NOTE — CARE COORDINATION
attempted to contact [facility].  left message with contact information and requested a call back to [phone].

## 2020-10-23 ENCOUNTER — CARE COORDINATION (OUTPATIENT)
Dept: CARE COORDINATION | Age: 65
End: 2020-10-23

## 2020-10-30 ENCOUNTER — CARE COORDINATION (OUTPATIENT)
Dept: CARE COORDINATION | Age: 65
End: 2020-10-30

## 2020-10-30 NOTE — CARE COORDINATION
Spoke with patient briefly, when asked how his blood sugars were doing  Patient stated he is unable to talk right now and asked if I could call back   Sometime next week. Will attempt to reach patient again within 1-2 weeks.

## 2020-11-02 ENCOUNTER — TELEPHONE (OUTPATIENT)
Dept: PODIATRY | Age: 65
End: 2020-11-02

## 2020-11-02 NOTE — TELEPHONE ENCOUNTER
Lima (RN) called stating that the patient TMA of left foot  looks infected redness around incision small pin whole around top of incision with drainage. . States patient hasn't been doing his daily dressings changes. RN states she would like for the patient to be seen for follow up with specialty clinic needs new dressing orders . RN states patient hasn't been able to be reached in the last week by phone or e-mail . Please advise

## 2020-11-03 ENCOUNTER — APPOINTMENT (OUTPATIENT)
Dept: GENERAL RADIOLOGY | Age: 65
DRG: 565 | End: 2020-11-03
Payer: MEDICARE

## 2020-11-03 ENCOUNTER — HOSPITAL ENCOUNTER (INPATIENT)
Age: 65
LOS: 9 days | Discharge: NURSING, REHAB OR LONG-TERM CARE | DRG: 565 | End: 2020-11-12
Attending: EMERGENCY MEDICINE | Admitting: FAMILY MEDICINE
Payer: MEDICARE

## 2020-11-03 ENCOUNTER — APPOINTMENT (OUTPATIENT)
Dept: CT IMAGING | Age: 65
DRG: 565 | End: 2020-11-03
Payer: MEDICARE

## 2020-11-03 PROBLEM — E10.628 TYPE 1 DIABETES MELLITUS WITH DIABETIC FOOT INFECTION (HCC): Status: ACTIVE | Noted: 2020-11-03

## 2020-11-03 PROBLEM — I73.9 PVD (PERIPHERAL VASCULAR DISEASE) (HCC): Status: RESOLVED | Noted: 2020-11-03 | Resolved: 2020-11-03

## 2020-11-03 PROBLEM — L08.9 TYPE 1 DIABETES MELLITUS WITH DIABETIC FOOT INFECTION (HCC): Status: ACTIVE | Noted: 2020-11-03

## 2020-11-03 PROBLEM — J44.9 COPD (CHRONIC OBSTRUCTIVE PULMONARY DISEASE) (HCC): Status: RESOLVED | Noted: 2020-11-03 | Resolved: 2020-11-03

## 2020-11-03 PROBLEM — M86.172 ACUTE OSTEOMYELITIS OF LEFT FOOT (HCC): Status: ACTIVE | Noted: 2020-11-03

## 2020-11-03 LAB
ABSOLUTE EOS #: 0.19 K/UL (ref 0–0.44)
ABSOLUTE IMMATURE GRANULOCYTE: 0.06 K/UL (ref 0–0.3)
ABSOLUTE LYMPH #: 1.64 K/UL (ref 1.1–3.7)
ABSOLUTE MONO #: 0.69 K/UL (ref 0.1–1.2)
ANION GAP SERPL CALCULATED.3IONS-SCNC: 10 MMOL/L (ref 9–17)
BASOPHILS # BLD: 1 % (ref 0–2)
BASOPHILS ABSOLUTE: 0.1 K/UL (ref 0–0.2)
BUN BLDV-MCNC: 16 MG/DL (ref 8–23)
BUN/CREAT BLD: 20 (ref 9–20)
C-REACTIVE PROTEIN: 67.9 MG/L (ref 0–5)
CALCIUM SERPL-MCNC: 9.4 MG/DL (ref 8.6–10.4)
CHLORIDE BLD-SCNC: 96 MMOL/L (ref 98–107)
CO2: 25 MMOL/L (ref 20–31)
CREAT SERPL-MCNC: 0.82 MG/DL (ref 0.7–1.2)
DIFFERENTIAL TYPE: ABNORMAL
EOSINOPHILS RELATIVE PERCENT: 2 % (ref 1–4)
GFR AFRICAN AMERICAN: >60 ML/MIN
GFR NON-AFRICAN AMERICAN: >60 ML/MIN
GFR SERPL CREATININE-BSD FRML MDRD: ABNORMAL ML/MIN/{1.73_M2}
GFR SERPL CREATININE-BSD FRML MDRD: ABNORMAL ML/MIN/{1.73_M2}
GLUCOSE BLD-MCNC: 220 MG/DL (ref 75–110)
GLUCOSE BLD-MCNC: 235 MG/DL (ref 75–110)
GLUCOSE BLD-MCNC: 292 MG/DL (ref 75–110)
GLUCOSE BLD-MCNC: 334 MG/DL (ref 70–99)
HCT VFR BLD CALC: 40.6 % (ref 40.7–50.3)
HEMOGLOBIN: 12.5 G/DL (ref 13–17)
IMMATURE GRANULOCYTES: 1 %
LACTIC ACID: 1 MMOL/L (ref 0.5–2.2)
LYMPHOCYTES # BLD: 14 % (ref 24–43)
MCH RBC QN AUTO: 27 PG (ref 25.2–33.5)
MCHC RBC AUTO-ENTMCNC: 30.8 G/DL (ref 28.4–34.8)
MCV RBC AUTO: 87.7 FL (ref 82.6–102.9)
MONOCYTES # BLD: 6 % (ref 3–12)
NRBC AUTOMATED: 0 PER 100 WBC
PDW BLD-RTO: 14.1 % (ref 11.8–14.4)
PLATELET # BLD: 388 K/UL (ref 138–453)
PLATELET ESTIMATE: ABNORMAL
PMV BLD AUTO: 10.1 FL (ref 8.1–13.5)
POTASSIUM SERPL-SCNC: 4.4 MMOL/L (ref 3.7–5.3)
RBC # BLD: 4.63 M/UL (ref 4.21–5.77)
RBC # BLD: ABNORMAL 10*6/UL
SEDIMENTATION RATE, ERYTHROCYTE: 53 MM (ref 0–20)
SEG NEUTROPHILS: 76 % (ref 36–65)
SEGMENTED NEUTROPHILS ABSOLUTE COUNT: 8.87 K/UL (ref 1.5–8.1)
SODIUM BLD-SCNC: 131 MMOL/L (ref 135–144)
WBC # BLD: 11.6 K/UL (ref 3.5–11.3)
WBC # BLD: ABNORMAL 10*3/UL

## 2020-11-03 PROCEDURE — 87070 CULTURE OTHR SPECIMN AEROBIC: CPT

## 2020-11-03 PROCEDURE — 6360000002 HC RX W HCPCS: Performed by: INTERNAL MEDICINE

## 2020-11-03 PROCEDURE — 36415 COLL VENOUS BLD VENIPUNCTURE: CPT

## 2020-11-03 PROCEDURE — 2580000003 HC RX 258: Performed by: INTERNAL MEDICINE

## 2020-11-03 PROCEDURE — 73630 X-RAY EXAM OF FOOT: CPT

## 2020-11-03 PROCEDURE — 73700 CT LOWER EXTREMITY W/O DYE: CPT

## 2020-11-03 PROCEDURE — 1200000000 HC SEMI PRIVATE

## 2020-11-03 PROCEDURE — 6370000000 HC RX 637 (ALT 250 FOR IP): Performed by: NURSE PRACTITIONER

## 2020-11-03 PROCEDURE — 6370000000 HC RX 637 (ALT 250 FOR IP): Performed by: EMERGENCY MEDICINE

## 2020-11-03 PROCEDURE — APPSS45 APP SPLIT SHARED TIME 31-45 MINUTES: Performed by: NURSE PRACTITIONER

## 2020-11-03 PROCEDURE — 87040 BLOOD CULTURE FOR BACTERIA: CPT

## 2020-11-03 PROCEDURE — 85025 COMPLETE CBC W/AUTO DIFF WBC: CPT

## 2020-11-03 PROCEDURE — 85652 RBC SED RATE AUTOMATED: CPT

## 2020-11-03 PROCEDURE — 86140 C-REACTIVE PROTEIN: CPT

## 2020-11-03 PROCEDURE — 6360000002 HC RX W HCPCS: Performed by: NURSE PRACTITIONER

## 2020-11-03 PROCEDURE — 83605 ASSAY OF LACTIC ACID: CPT

## 2020-11-03 PROCEDURE — 99222 1ST HOSP IP/OBS MODERATE 55: CPT | Performed by: FAMILY MEDICINE

## 2020-11-03 PROCEDURE — 99283 EMERGENCY DEPT VISIT LOW MDM: CPT

## 2020-11-03 PROCEDURE — 87075 CULTR BACTERIA EXCEPT BLOOD: CPT

## 2020-11-03 PROCEDURE — 86403 PARTICLE AGGLUT ANTBDY SCRN: CPT

## 2020-11-03 PROCEDURE — 6360000002 HC RX W HCPCS: Performed by: EMERGENCY MEDICINE

## 2020-11-03 PROCEDURE — 2580000003 HC RX 258: Performed by: NURSE PRACTITIONER

## 2020-11-03 PROCEDURE — 82947 ASSAY GLUCOSE BLOOD QUANT: CPT

## 2020-11-03 PROCEDURE — 87205 SMEAR GRAM STAIN: CPT

## 2020-11-03 PROCEDURE — 80048 BASIC METABOLIC PNL TOTAL CA: CPT

## 2020-11-03 RX ORDER — IPRATROPIUM BROMIDE AND ALBUTEROL SULFATE 2.5; .5 MG/3ML; MG/3ML
3 SOLUTION RESPIRATORY (INHALATION)
Status: DISCONTINUED | OUTPATIENT
Start: 2020-11-03 | End: 2020-11-03

## 2020-11-03 RX ORDER — OXYCODONE HYDROCHLORIDE AND ACETAMINOPHEN 5; 325 MG/1; MG/1
2 TABLET ORAL EVERY 4 HOURS PRN
Status: DISCONTINUED | OUTPATIENT
Start: 2020-11-03 | End: 2020-11-12 | Stop reason: HOSPADM

## 2020-11-03 RX ORDER — PROMETHAZINE HYDROCHLORIDE 12.5 MG/1
12.5 TABLET ORAL EVERY 6 HOURS PRN
Status: DISCONTINUED | OUTPATIENT
Start: 2020-11-03 | End: 2020-11-12 | Stop reason: HOSPADM

## 2020-11-03 RX ORDER — SODIUM CHLORIDE 0.9 % (FLUSH) 0.9 %
10 SYRINGE (ML) INJECTION PRN
Status: DISCONTINUED | OUTPATIENT
Start: 2020-11-03 | End: 2020-11-12 | Stop reason: HOSPADM

## 2020-11-03 RX ORDER — NORTRIPTYLINE HYDROCHLORIDE 25 MG/1
50 CAPSULE ORAL NIGHTLY
Status: DISCONTINUED | OUTPATIENT
Start: 2020-11-03 | End: 2020-11-12 | Stop reason: HOSPADM

## 2020-11-03 RX ORDER — MORPHINE SULFATE 2 MG/ML
2 INJECTION, SOLUTION INTRAMUSCULAR; INTRAVENOUS
Status: DISCONTINUED | OUTPATIENT
Start: 2020-11-03 | End: 2020-11-08

## 2020-11-03 RX ORDER — DOCUSATE SODIUM 100 MG/1
100 CAPSULE, LIQUID FILLED ORAL 2 TIMES DAILY PRN
Status: DISCONTINUED | OUTPATIENT
Start: 2020-11-03 | End: 2020-11-12 | Stop reason: HOSPADM

## 2020-11-03 RX ORDER — MAGNESIUM SULFATE 1 G/100ML
1 INJECTION INTRAVENOUS PRN
Status: DISCONTINUED | OUTPATIENT
Start: 2020-11-03 | End: 2020-11-12 | Stop reason: HOSPADM

## 2020-11-03 RX ORDER — DILTIAZEM HYDROCHLORIDE 60 MG/1
60 TABLET, FILM COATED ORAL EVERY 6 HOURS SCHEDULED
Status: DISCONTINUED | OUTPATIENT
Start: 2020-11-03 | End: 2020-11-12 | Stop reason: HOSPADM

## 2020-11-03 RX ORDER — INSULIN GLARGINE 100 [IU]/ML
70 INJECTION, SOLUTION SUBCUTANEOUS NIGHTLY
Status: DISCONTINUED | OUTPATIENT
Start: 2020-11-03 | End: 2020-11-12 | Stop reason: HOSPADM

## 2020-11-03 RX ORDER — MORPHINE SULFATE 4 MG/ML
4 INJECTION, SOLUTION INTRAMUSCULAR; INTRAVENOUS
Status: DISCONTINUED | OUTPATIENT
Start: 2020-11-03 | End: 2020-11-08

## 2020-11-03 RX ORDER — ACETAMINOPHEN 650 MG/1
650 SUPPOSITORY RECTAL EVERY 6 HOURS PRN
Status: DISCONTINUED | OUTPATIENT
Start: 2020-11-03 | End: 2020-11-12 | Stop reason: HOSPADM

## 2020-11-03 RX ORDER — SODIUM CHLORIDE 9 MG/ML
INJECTION, SOLUTION INTRAVENOUS CONTINUOUS
Status: DISCONTINUED | OUTPATIENT
Start: 2020-11-03 | End: 2020-11-10

## 2020-11-03 RX ORDER — NICOTINE 21 MG/24HR
1 PATCH, TRANSDERMAL 24 HOURS TRANSDERMAL DAILY PRN
Status: DISCONTINUED | OUTPATIENT
Start: 2020-11-03 | End: 2020-11-12 | Stop reason: HOSPADM

## 2020-11-03 RX ORDER — NICOTINE POLACRILEX 4 MG
15 LOZENGE BUCCAL PRN
Status: DISCONTINUED | OUTPATIENT
Start: 2020-11-03 | End: 2020-11-12 | Stop reason: HOSPADM

## 2020-11-03 RX ORDER — SODIUM CHLORIDE 0.9 % (FLUSH) 0.9 %
10 SYRINGE (ML) INJECTION EVERY 12 HOURS SCHEDULED
Status: DISCONTINUED | OUTPATIENT
Start: 2020-11-03 | End: 2020-11-12 | Stop reason: HOSPADM

## 2020-11-03 RX ORDER — ATORVASTATIN CALCIUM 40 MG/1
40 TABLET, FILM COATED ORAL NIGHTLY
Status: DISCONTINUED | OUTPATIENT
Start: 2020-11-03 | End: 2020-11-12 | Stop reason: HOSPADM

## 2020-11-03 RX ORDER — DEXTROSE MONOHYDRATE 50 MG/ML
100 INJECTION, SOLUTION INTRAVENOUS PRN
Status: DISCONTINUED | OUTPATIENT
Start: 2020-11-03 | End: 2020-11-12 | Stop reason: HOSPADM

## 2020-11-03 RX ORDER — ACETAMINOPHEN 325 MG/1
650 TABLET ORAL EVERY 6 HOURS PRN
Status: DISCONTINUED | OUTPATIENT
Start: 2020-11-03 | End: 2020-11-12 | Stop reason: HOSPADM

## 2020-11-03 RX ORDER — MORPHINE SULFATE 4 MG/ML
4 INJECTION, SOLUTION INTRAMUSCULAR; INTRAVENOUS ONCE
Status: COMPLETED | OUTPATIENT
Start: 2020-11-03 | End: 2020-11-03

## 2020-11-03 RX ORDER — OXYCODONE HYDROCHLORIDE AND ACETAMINOPHEN 5; 325 MG/1; MG/1
1 TABLET ORAL ONCE
Status: COMPLETED | OUTPATIENT
Start: 2020-11-03 | End: 2020-11-03

## 2020-11-03 RX ORDER — ALBUTEROL SULFATE 90 UG/1
2 AEROSOL, METERED RESPIRATORY (INHALATION) EVERY 6 HOURS PRN
Status: DISCONTINUED | OUTPATIENT
Start: 2020-11-03 | End: 2020-11-12 | Stop reason: HOSPADM

## 2020-11-03 RX ORDER — POLYETHYLENE GLYCOL 3350 17 G/17G
17 POWDER, FOR SOLUTION ORAL DAILY PRN
Status: DISCONTINUED | OUTPATIENT
Start: 2020-11-03 | End: 2020-11-12 | Stop reason: HOSPADM

## 2020-11-03 RX ORDER — POTASSIUM CHLORIDE 7.45 MG/ML
10 INJECTION INTRAVENOUS PRN
Status: DISCONTINUED | OUTPATIENT
Start: 2020-11-03 | End: 2020-11-12 | Stop reason: HOSPADM

## 2020-11-03 RX ORDER — FAMOTIDINE 20 MG/1
20 TABLET, FILM COATED ORAL 2 TIMES DAILY
Status: DISCONTINUED | OUTPATIENT
Start: 2020-11-03 | End: 2020-11-12 | Stop reason: HOSPADM

## 2020-11-03 RX ORDER — ONDANSETRON 2 MG/ML
4 INJECTION INTRAMUSCULAR; INTRAVENOUS EVERY 6 HOURS PRN
Status: DISCONTINUED | OUTPATIENT
Start: 2020-11-03 | End: 2020-11-12 | Stop reason: HOSPADM

## 2020-11-03 RX ORDER — POTASSIUM CHLORIDE 20 MEQ/1
40 TABLET, EXTENDED RELEASE ORAL PRN
Status: DISCONTINUED | OUTPATIENT
Start: 2020-11-03 | End: 2020-11-12 | Stop reason: HOSPADM

## 2020-11-03 RX ORDER — DEXTROSE MONOHYDRATE 25 G/50ML
12.5 INJECTION, SOLUTION INTRAVENOUS PRN
Status: DISCONTINUED | OUTPATIENT
Start: 2020-11-03 | End: 2020-11-12 | Stop reason: HOSPADM

## 2020-11-03 RX ADMIN — DILTIAZEM HYDROCHLORIDE 60 MG: 60 TABLET, FILM COATED ORAL at 17:58

## 2020-11-03 RX ADMIN — MORPHINE SULFATE 4 MG: 4 INJECTION, SOLUTION INTRAMUSCULAR; INTRAVENOUS at 11:09

## 2020-11-03 RX ADMIN — INSULIN LISPRO 2 UNITS: 100 INJECTION, SOLUTION INTRAVENOUS; SUBCUTANEOUS at 20:26

## 2020-11-03 RX ADMIN — MORPHINE SULFATE 4 MG: 4 INJECTION, SOLUTION INTRAMUSCULAR; INTRAVENOUS at 08:42

## 2020-11-03 RX ADMIN — APIXABAN 5 MG: 5 TABLET, FILM COATED ORAL at 12:15

## 2020-11-03 RX ADMIN — SODIUM CHLORIDE: 9 INJECTION, SOLUTION INTRAVENOUS at 11:00

## 2020-11-03 RX ADMIN — ONDANSETRON 4 MG: 2 INJECTION INTRAMUSCULAR; INTRAVENOUS at 12:35

## 2020-11-03 RX ADMIN — APIXABAN 5 MG: 5 TABLET, FILM COATED ORAL at 20:15

## 2020-11-03 RX ADMIN — MORPHINE SULFATE 4 MG: 4 INJECTION, SOLUTION INTRAMUSCULAR; INTRAVENOUS at 20:16

## 2020-11-03 RX ADMIN — INSULIN LISPRO 4 UNITS: 100 INJECTION, SOLUTION INTRAVENOUS; SUBCUTANEOUS at 17:58

## 2020-11-03 RX ADMIN — ONDANSETRON 4 MG: 2 INJECTION INTRAMUSCULAR; INTRAVENOUS at 17:58

## 2020-11-03 RX ADMIN — NORTRIPTYLINE HYDROCHLORIDE 50 MG: 25 CAPSULE ORAL at 20:16

## 2020-11-03 RX ADMIN — SODIUM CHLORIDE, PRESERVATIVE FREE 10 ML: 5 INJECTION INTRAVENOUS at 20:31

## 2020-11-03 RX ADMIN — DILTIAZEM HYDROCHLORIDE 60 MG: 60 TABLET, FILM COATED ORAL at 12:15

## 2020-11-03 RX ADMIN — FAMOTIDINE 20 MG: 20 TABLET, FILM COATED ORAL at 20:15

## 2020-11-03 RX ADMIN — INSULIN GLARGINE 70 UNITS: 100 INJECTION, SOLUTION SUBCUTANEOUS at 20:16

## 2020-11-03 RX ADMIN — PIPERACILLIN AND TAZOBACTAM 3.38 G: 3; .375 INJECTION, POWDER, LYOPHILIZED, FOR SOLUTION INTRAVENOUS at 17:58

## 2020-11-03 RX ADMIN — VANCOMYCIN HYDROCHLORIDE 2000 MG: 1 INJECTION, POWDER, LYOPHILIZED, FOR SOLUTION INTRAVENOUS at 12:15

## 2020-11-03 RX ADMIN — OXYCODONE HYDROCHLORIDE AND ACETAMINOPHEN 1 TABLET: 5; 325 TABLET ORAL at 06:25

## 2020-11-03 RX ADMIN — PIPERACILLIN SODIUM AND TAZOBACTAM SODIUM 4.5 G: 4; .5 INJECTION, POWDER, LYOPHILIZED, FOR SOLUTION INTRAVENOUS at 11:00

## 2020-11-03 RX ADMIN — ATORVASTATIN CALCIUM 40 MG: 40 TABLET, FILM COATED ORAL at 20:16

## 2020-11-03 RX ADMIN — FAMOTIDINE 20 MG: 20 TABLET, FILM COATED ORAL at 12:15

## 2020-11-03 RX ADMIN — INSULIN LISPRO 6 UNITS: 100 INJECTION, SOLUTION INTRAVENOUS; SUBCUTANEOUS at 12:16

## 2020-11-03 RX ADMIN — MORPHINE SULFATE 4 MG: 4 INJECTION, SOLUTION INTRAMUSCULAR; INTRAVENOUS at 15:26

## 2020-11-03 RX ADMIN — METFORMIN HYDROCHLORIDE 500 MG: 500 TABLET ORAL at 17:58

## 2020-11-03 ASSESSMENT — PAIN SCALES - GENERAL
PAINLEVEL_OUTOF10: 7
PAINLEVEL_OUTOF10: 8
PAINLEVEL_OUTOF10: 7
PAINLEVEL_OUTOF10: 8
PAINLEVEL_OUTOF10: 8
PAINLEVEL_OUTOF10: 7
PAINLEVEL_OUTOF10: 7
PAINLEVEL_OUTOF10: 8
PAINLEVEL_OUTOF10: 8

## 2020-11-03 ASSESSMENT — ENCOUNTER SYMPTOMS
EYES NEGATIVE: 1
RHINORRHEA: 0
SHORTNESS OF BREATH: 0
PHOTOPHOBIA: 0
ABDOMINAL DISTENTION: 0
VOMITING: 1
DIARRHEA: 0
SORE THROAT: 0
NAUSEA: 0
COUGH: 0
CHEST TIGHTNESS: 0
APNEA: 0
WHEEZING: 0
COLOR CHANGE: 0
SINUS PAIN: 0
VOMITING: 0
COLOR CHANGE: 1
TROUBLE SWALLOWING: 0
NAUSEA: 1
GASTROINTESTINAL NEGATIVE: 1
CONSTIPATION: 0

## 2020-11-03 ASSESSMENT — PAIN DESCRIPTION - LOCATION
LOCATION: FOOT
LOCATION: FOOT

## 2020-11-03 ASSESSMENT — PAIN DESCRIPTION - ORIENTATION
ORIENTATION: LEFT
ORIENTATION: LEFT

## 2020-11-03 ASSESSMENT — PAIN DESCRIPTION - PAIN TYPE
TYPE: ACUTE PAIN
TYPE: ACUTE PAIN

## 2020-11-03 NOTE — CONSULTS
Pharmacy Note  Vancomycin Consult - Initial Note     Sofia Search is a 63 y.o. male ordered Vancomycin. .  Current diagnosis for which MRSA is suspected/confirmed:foot infection   Vancomycin order/consult received from Dr. Lisha Bernal     Additional antimicrobials:  zosyn    Patient Active Problem List   Diagnosis    Type 2 diabetes mellitus with diabetic polyneuropathy, with long-term current use of insulin ([address]    Neuropathic pain, leg    Diabetic polyneuropathy ([facility])    Allergic rhinitis    Tobacco dependence    COPD (chronic obstructive pulmonary disease) ([facility])    Edentulous    Dysphagia    Lung nodules    Esophageal cancer ([facility])    Fracture of humerus, left, closed    Closed fracture of humerus    DM type 2 with diabetic peripheral neuropathy ([address]    COPD without exacerbation ([facility])    Dyslipidemia    Gastroesophageal reflux disease    Chronic pain syndrome    Marijuana use    History of colon polyps    Cellulitis of right heel    PVD (peripheral vascular disease) ([facility])    Functional diarrhea    Sepsis due to methicillin resistant Staphylococcus aureus (MRSA) ([facility])    History of esophageal cancer    Osteomyelitis, chronic, ankle or foot ([address]    Peripheral artery disease ([address]    Other pulmonary embolism without acute cor pulmonale ([facility])    Carotid stenosis, asymptomatic, bilateral    Lower limb amputation status    Fx humeral neck, right, closed, initial encounter    Transient hypotension    Constipation due to opioid therapy    Acute kidney injury ([address]    Diabetic ulcer of left midfoot associated with type 2 diabetes mellitus, with fat layer exposed ([address]    Complication of below knee amputation stump ([facility])    COPD exacerbation ([facility])    Hyponatremia    Pain, phantom limb ([address]    Below-knee amputation of right lower extremity ([facility])    Hyperglycemia    Moderate protein malnutrition ([address]    Essential hypertension    Uncontrolled type 2 diabetes mellitus with hyperglycemia ([facility])    History of pulmonary

## 2020-11-03 NOTE — PROGRESS NOTES
Direct oral anticoagulant (DOAC) - Initial Pharmacy Review  PLAN    No obvious intervention needed. Liz Trejo  11/3/2020  10:29 AM    ASSESSMENT   Patient chart reviewed for indication and appropriateness of dose and therapy of Apixaban.:  Indication:  Peripheral vascular disease. [employer] PATIENT INFORMATION   Body mass index is 22.36 kg/m². Wt Readings from Last 1 Encounters:   20 164 lb 14.5 oz (74.8 kg)     Some sources recommend that DOACs be avoided in weight < 50 or > 120 kg, or BMI > 40 whenever possible; however. some smaller studies suggest that DOACs may be safe and efficacious in this population. Recent Labs     20  0630   CREATININE 0.82     Recent Labs     20  0630   HGB 12.5*   HCT 40.6*        No results for input(s): INR in the last 72 hours. Weight  kg ? BMI Less than   40 kg/m2 Calculated CrCl  Using ABW (mL/min) Other anticoagulants on MAR Drug interactions  (since admission) reviewed   yes    Wt Readings from Last 1 Encounters:   20 164 lb 14.5 oz (74.8 kg)    yes    Body mass index is 22.36 kg/m². 98 ml/min        (140-age)*ABW    72 * sCr    X 0.85 for females No concurrent anticoagulants ordered.  No interactions/no new drug interactions identified requiring action.                 -----------------------------------------------------------------------------------------------------------------    CRCL Calculation for DOACs  (use ABW)    CrCl male:  (140-Age) * ABW           For female:  (140-Age) * ABW * 0.85                       72  *  sCr                                              72 * sCr           Apixaban (Eliquis)  Indication Dose (Oral) Renal Adjustment (CrCl calculated on ABW) Select Drug Interactions   NVAF 5mg BID 2.5mg BID  IF patient has TWO of the following:  Age>80, Weight <60 kg, SCr > 1.5  (If dialysis, but not meeting above criteria, keep 5 mg BID*)   Strong P-gp/CY inducers (Avoid combination)  Apalutamide, CarBAMazepine, Enzalutamide, Fosphenytoin, Lumacaftor, Mitotane, PHENobarbital, Phenytoin, Primidone, RifAMPin    P-gp/CY inhibitors (Avoid use or dose adjustment)  Clarithromycin, Itraconazole, Ketoconazole (systemic), Ombitasvir, Paritaprevir, Ritonavir       DVT/PE  Treatment 10mg BID x7d, then 5mg BID No dosage adjustment necessary; however, patients with a serum creatinine >2.5 mg/dL or CrCl <25 mL/minute (as determined by Cockcroft-Gault equation) were excluded from the clinical trials*    DVT/PE  Recurrence Preventions 5mg BID (or potentially 2.5mg BID after at least 6 months of treatment)     DVT prevention PostOp Knee: 2.5mg BID x 12d  Hip: 2.5mg BID x 35d No dosage adjustment necessary; however, patients with either clinically significant renal impairment, impaired renal function, or CrCl <30 mL/minute were excluded from the respective clinical trials. For patients receiving dual strong CY and P-glycoprotein inhibitors (eg, clarithromycin, ketoconazole, itraconazole, ritonavir) and apixaban doses >2.5 mg twice daily, reduce apixaban dose by 50%   *(Salvador, 2013a; Salvador, 2013b)            * Lino TA, et al. J Am Soc Nephrol 2017. doi:10.1681/ASN. [id number]  *Kylie SPRINGER et al. Circulation 2018.  ZNH:07.2586/[id number]  Tiarra Vega et al. [facility] 2019  Nenita Wongim M, et al. Blood [id number]  Anca Sotelo et al. CHEST 2018  Hola Callahan et al. Lydia Pharmacother 2019  [facility]/bhavya MADISON al. Circulation 2018

## 2020-11-03 NOTE — PROGRESS NOTES
Patient admitted to room 1001-2 per ER cart, patient able to move himself to bed. Patient oriented to room with call light and personal belongings within reach. Bed alarm activated for safety. Will continue to monitor.

## 2020-11-03 NOTE — PLAN OF CARE
Problem: Pain:  Goal: Pain level will decrease  Description: Pain level will decrease  Outcome: Ongoing   Patient states understanding of pain scale and interventions. Pain assessed with hourly rounding and PRN. Patient states pain level is moderate. Will continue to monitor. Problem: Skin Integrity:  Goal: Will show no infection signs and symptoms  Description: Will show no infection signs and symptoms  Outcome: Ongoing   Skin No rashes or nodules noted, R BKA, dressing noted to LLE. Mucus membranes moist. Patient turned and repositioned as needed. Heels elevated as needed. Dressings changed as needed and per orders. Continue to monitor skin for breakdown. Problem: Falls - Risk of:  Goal: Will remain free from falls  Description: Will remain free from falls  Outcome: Ongoing   Patient is fall risk per fall scale. Falling star on door. Fall sticker on armband. Hourly rounding performed. Personal belongings and call light within reach. Bed in low position. Restraint alternatives in place.
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
## 2020-11-03 NOTE — ED PROVIDER NOTES
Medical Decision Making: The patient was initially seen by Dr. Foster Diallo and signed out to me after discussing the case with her thoroughly. The patient has drainage from his foot and has been seen by podiatry. He is being admitted for IV antibiotic. Treatment diagnosis and disposition were discussed with the patient. Xr Foot Left (min 3 Views)    Result Date: 11/3/2020  EXAMINATION: THREE XRAY VIEWS OF THE LEFT FOOT 11/3/2020 6:40 am COMPARISON: 08/24/2020 HISTORY: ORDERING SYSTEM PROVIDED HISTORY: diabetic foot wound TECHNOLOGIST PROVIDED HISTORY: diabetic foot wound Reason for Exam: Possible infection in foot. Acuity: Acute Type of Exam: Initial FINDINGS: Transmetatarsal amputation again noted. Mild soft tissue swelling near the amputation site is noted. No soft tissue gas identified. Periosteal new bone formation is noted about the remaining 1st-3rd metatarsals and the margins of the amputation site of the 1st metatarsal now appear irregular. Status post transmetatarsal amputation. Periosteal new bone formation is noted about the 1st, 2nd and 3rd metatarsal and the amputation margin of the 1st metatarsal now appears irregular. The possibility of underlying osteomyelitis should be considered and further evaluated with MRI. CONSULTS:  None    PROCEDURES:  None    FINAL IMPRESSION      1. Cellulitis of left foot         DISPOSITION/PLAN   DISPOSITION Decision To Admit 11/03/2020 08:07:46 AM       PATIENT REFERRED TO:   No follow-up provider specified.     DISCHARGE MEDICATIONS:     New Prescriptions    No medications on file         (Please note that portions of this note were completed with a voice recognition program.  Efforts were made to edit the dictations but occasionally words are mis-transcribed.)    Thomas Denver, MD  Attending Emergency Physician          Thomas Denver, MD  11/03/20 6012

## 2020-11-04 PROBLEM — E44.1 MILD MALNUTRITION (HCC): Status: ACTIVE | Noted: 2020-11-04

## 2020-11-04 LAB
ANION GAP SERPL CALCULATED.3IONS-SCNC: 8 MMOL/L (ref 9–17)
BUN BLDV-MCNC: 15 MG/DL (ref 8–23)
BUN/CREAT BLD: 18 (ref 9–20)
CALCIUM SERPL-MCNC: 8.7 MG/DL (ref 8.6–10.4)
CHLORIDE BLD-SCNC: 104 MMOL/L (ref 98–107)
CO2: 27 MMOL/L (ref 20–31)
CREAT SERPL-MCNC: 0.85 MG/DL (ref 0.7–1.2)
GFR AFRICAN AMERICAN: >60 ML/MIN
GFR NON-AFRICAN AMERICAN: >60 ML/MIN
GFR SERPL CREATININE-BSD FRML MDRD: ABNORMAL ML/MIN/{1.73_M2}
GFR SERPL CREATININE-BSD FRML MDRD: ABNORMAL ML/MIN/{1.73_M2}
GLUCOSE BLD-MCNC: 179 MG/DL (ref 75–110)
GLUCOSE BLD-MCNC: 187 MG/DL (ref 70–99)
GLUCOSE BLD-MCNC: 188 MG/DL (ref 75–110)
GLUCOSE BLD-MCNC: 214 MG/DL (ref 75–110)
GLUCOSE BLD-MCNC: 293 MG/DL (ref 75–110)
HCT VFR BLD CALC: 36.4 % (ref 40.7–50.3)
HEMOGLOBIN: 11.1 G/DL (ref 13–17)
MCH RBC QN AUTO: 27.3 PG (ref 25.2–33.5)
MCHC RBC AUTO-ENTMCNC: 30.5 G/DL (ref 28.4–34.8)
MCV RBC AUTO: 89.4 FL (ref 82.6–102.9)
NRBC AUTOMATED: 0 PER 100 WBC
PDW BLD-RTO: 14.4 % (ref 11.8–14.4)
PLATELET # BLD: 367 K/UL (ref 138–453)
PMV BLD AUTO: 9.8 FL (ref 8.1–13.5)
POTASSIUM SERPL-SCNC: 3.8 MMOL/L (ref 3.7–5.3)
RBC # BLD: 4.07 M/UL (ref 4.21–5.77)
SODIUM BLD-SCNC: 139 MMOL/L (ref 135–144)
WBC # BLD: 12 K/UL (ref 3.5–11.3)

## 2020-11-04 PROCEDURE — 99232 SBSQ HOSP IP/OBS MODERATE 35: CPT | Performed by: FAMILY MEDICINE

## 2020-11-04 PROCEDURE — 6360000002 HC RX W HCPCS: Performed by: INTERNAL MEDICINE

## 2020-11-04 PROCEDURE — 97162 PT EVAL MOD COMPLEX 30 MIN: CPT

## 2020-11-04 PROCEDURE — 1200000000 HC SEMI PRIVATE

## 2020-11-04 PROCEDURE — 6360000002 HC RX W HCPCS: Performed by: NURSE PRACTITIONER

## 2020-11-04 PROCEDURE — 6370000000 HC RX 637 (ALT 250 FOR IP): Performed by: NURSE PRACTITIONER

## 2020-11-04 PROCEDURE — 36415 COLL VENOUS BLD VENIPUNCTURE: CPT

## 2020-11-04 PROCEDURE — 2580000003 HC RX 258: Performed by: INTERNAL MEDICINE

## 2020-11-04 PROCEDURE — APPSS45 APP SPLIT SHARED TIME 31-45 MINUTES: Performed by: NURSE PRACTITIONER

## 2020-11-04 PROCEDURE — 99222 1ST HOSP IP/OBS MODERATE 55: CPT | Performed by: NURSE PRACTITIONER

## 2020-11-04 PROCEDURE — 97530 THERAPEUTIC ACTIVITIES: CPT

## 2020-11-04 PROCEDURE — 85027 COMPLETE CBC AUTOMATED: CPT

## 2020-11-04 PROCEDURE — 82947 ASSAY GLUCOSE BLOOD QUANT: CPT

## 2020-11-04 PROCEDURE — 2580000003 HC RX 258: Performed by: NURSE PRACTITIONER

## 2020-11-04 PROCEDURE — 99211 OFF/OP EST MAY X REQ PHY/QHP: CPT

## 2020-11-04 PROCEDURE — 80048 BASIC METABOLIC PNL TOTAL CA: CPT

## 2020-11-04 RX ADMIN — APIXABAN 5 MG: 5 TABLET, FILM COATED ORAL at 21:32

## 2020-11-04 RX ADMIN — DILTIAZEM HYDROCHLORIDE 60 MG: 60 TABLET, FILM COATED ORAL at 11:53

## 2020-11-04 RX ADMIN — ONDANSETRON 4 MG: 2 INJECTION INTRAMUSCULAR; INTRAVENOUS at 21:31

## 2020-11-04 RX ADMIN — SODIUM CHLORIDE: 9 INJECTION, SOLUTION INTRAVENOUS at 20:28

## 2020-11-04 RX ADMIN — MORPHINE SULFATE 4 MG: 4 INJECTION, SOLUTION INTRAMUSCULAR; INTRAVENOUS at 20:28

## 2020-11-04 RX ADMIN — OXYCODONE HYDROCHLORIDE AND ACETAMINOPHEN 2 TABLET: 5; 325 TABLET ORAL at 23:52

## 2020-11-04 RX ADMIN — PIPERACILLIN AND TAZOBACTAM 3.38 G: 3; .375 INJECTION, POWDER, LYOPHILIZED, FOR SOLUTION INTRAVENOUS at 16:36

## 2020-11-04 RX ADMIN — ATORVASTATIN CALCIUM 40 MG: 40 TABLET, FILM COATED ORAL at 21:32

## 2020-11-04 RX ADMIN — INSULIN LISPRO 2 UNITS: 100 INJECTION, SOLUTION INTRAVENOUS; SUBCUTANEOUS at 08:21

## 2020-11-04 RX ADMIN — VANCOMYCIN HYDROCHLORIDE 1250 MG: 5 INJECTION, POWDER, LYOPHILIZED, FOR SOLUTION INTRAVENOUS at 00:28

## 2020-11-04 RX ADMIN — DILTIAZEM HYDROCHLORIDE 60 MG: 60 TABLET, FILM COATED ORAL at 23:52

## 2020-11-04 RX ADMIN — VANCOMYCIN HYDROCHLORIDE 1250 MG: 5 INJECTION, POWDER, LYOPHILIZED, FOR SOLUTION INTRAVENOUS at 12:34

## 2020-11-04 RX ADMIN — Medication 2 MG: at 06:15

## 2020-11-04 RX ADMIN — METFORMIN HYDROCHLORIDE 500 MG: 500 TABLET ORAL at 17:25

## 2020-11-04 RX ADMIN — DILTIAZEM HYDROCHLORIDE 60 MG: 60 TABLET, FILM COATED ORAL at 06:13

## 2020-11-04 RX ADMIN — PIPERACILLIN AND TAZOBACTAM 3.38 G: 3; .375 INJECTION, POWDER, LYOPHILIZED, FOR SOLUTION INTRAVENOUS at 08:22

## 2020-11-04 RX ADMIN — DILTIAZEM HYDROCHLORIDE 60 MG: 60 TABLET, FILM COATED ORAL at 00:28

## 2020-11-04 RX ADMIN — Medication 2 MG: at 00:32

## 2020-11-04 RX ADMIN — SODIUM CHLORIDE 500 MG: 9 INJECTION, SOLUTION INTRAVENOUS at 14:47

## 2020-11-04 RX ADMIN — OXYCODONE HYDROCHLORIDE AND ACETAMINOPHEN 2 TABLET: 5; 325 TABLET ORAL at 10:35

## 2020-11-04 RX ADMIN — INSULIN LISPRO 4 UNITS: 100 INJECTION, SOLUTION INTRAVENOUS; SUBCUTANEOUS at 17:25

## 2020-11-04 RX ADMIN — NORTRIPTYLINE HYDROCHLORIDE 50 MG: 25 CAPSULE ORAL at 21:32

## 2020-11-04 RX ADMIN — Medication 2 MG: at 12:34

## 2020-11-04 RX ADMIN — INSULIN GLARGINE 70 UNITS: 100 INJECTION, SOLUTION SUBCUTANEOUS at 21:32

## 2020-11-04 RX ADMIN — FAMOTIDINE 20 MG: 20 TABLET, FILM COATED ORAL at 20:28

## 2020-11-04 RX ADMIN — APIXABAN 5 MG: 5 TABLET, FILM COATED ORAL at 08:22

## 2020-11-04 RX ADMIN — INSULIN LISPRO 1 UNITS: 100 INJECTION, SOLUTION INTRAVENOUS; SUBCUTANEOUS at 21:32

## 2020-11-04 RX ADMIN — METFORMIN HYDROCHLORIDE 500 MG: 500 TABLET ORAL at 08:22

## 2020-11-04 RX ADMIN — INSULIN LISPRO 4 UNITS: 100 INJECTION, SOLUTION INTRAVENOUS; SUBCUTANEOUS at 11:53

## 2020-11-04 RX ADMIN — FAMOTIDINE 20 MG: 20 TABLET, FILM COATED ORAL at 08:22

## 2020-11-04 RX ADMIN — OXYCODONE HYDROCHLORIDE AND ACETAMINOPHEN 2 TABLET: 5; 325 TABLET ORAL at 19:11

## 2020-11-04 RX ADMIN — PIPERACILLIN AND TAZOBACTAM 3.38 G: 3; .375 INJECTION, POWDER, LYOPHILIZED, FOR SOLUTION INTRAVENOUS at 02:13

## 2020-11-04 RX ADMIN — DILTIAZEM HYDROCHLORIDE 60 MG: 60 TABLET, FILM COATED ORAL at 17:25

## 2020-11-04 ASSESSMENT — PAIN DESCRIPTION - FREQUENCY
FREQUENCY: CONTINUOUS

## 2020-11-04 ASSESSMENT — PAIN DESCRIPTION - PROGRESSION
CLINICAL_PROGRESSION: NOT CHANGED

## 2020-11-04 ASSESSMENT — PAIN SCALES - GENERAL
PAINLEVEL_OUTOF10: 6
PAINLEVEL_OUTOF10: 0
PAINLEVEL_OUTOF10: 7
PAINLEVEL_OUTOF10: 7
PAINLEVEL_OUTOF10: 3
PAINLEVEL_OUTOF10: 7
PAINLEVEL_OUTOF10: 8
PAINLEVEL_OUTOF10: 8
PAINLEVEL_OUTOF10: 5
PAINLEVEL_OUTOF10: 6
PAINLEVEL_OUTOF10: 3
PAINLEVEL_OUTOF10: 5
PAINLEVEL_OUTOF10: 7
PAINLEVEL_OUTOF10: 6

## 2020-11-04 ASSESSMENT — PAIN DESCRIPTION - PAIN TYPE
TYPE: CHRONIC PAIN

## 2020-11-04 ASSESSMENT — PAIN - FUNCTIONAL ASSESSMENT
PAIN_FUNCTIONAL_ASSESSMENT: ACTIVITIES ARE NOT PREVENTED
PAIN_FUNCTIONAL_ASSESSMENT: PREVENTS OR INTERFERES SOME ACTIVE ACTIVITIES AND ADLS
PAIN_FUNCTIONAL_ASSESSMENT: ACTIVITIES ARE NOT PREVENTED

## 2020-11-04 ASSESSMENT — PAIN DESCRIPTION - LOCATION
LOCATION: FOOT

## 2020-11-04 ASSESSMENT — PAIN DESCRIPTION - DESCRIPTORS
DESCRIPTORS: DISCOMFORT;ACHING
DESCRIPTORS: ACHING;DISCOMFORT
DESCRIPTORS: ACHING;DISCOMFORT
DESCRIPTORS: ACHING
DESCRIPTORS: ACHING

## 2020-11-04 ASSESSMENT — PAIN DESCRIPTION - ONSET
ONSET: ON-GOING

## 2020-11-04 ASSESSMENT — PAIN DESCRIPTION - ORIENTATION
ORIENTATION: LEFT

## 2020-11-04 NOTE — PLAN OF CARE
Problem: Pain:  Goal: Control of acute pain  Description: Control of acute pain  11/4/2020 1050 by Johan Harman RN  Outcome: Ongoing  Note: Pt reports adequate pain control with current meds  11/4/2020 0447 by Marce Dejesus RN  Outcome: Ongoing  Note: Patient took pain  medication as needed and stated it was helping control his pain.        Problem: Falls - Risk of:  Goal: Will remain free from falls  Description: Will remain free from falls  11/4/2020 1050 by Johan Harman RN  Outcome: Ongoing  Note: Fall safety precautions remain in place, alarms on  11/4/2020 0447 by Marce Dejesus RN  Outcome: Ongoing

## 2020-11-04 NOTE — PROGRESS NOTES
Comprehensive Nutrition Assessment    Type and Reason for Visit:  Positive Nutrition Screen(Pressure ulcer or non-healing wound)    Nutrition Recommendations/Plan:   1. Continue Carb control diet  2. Start Glucerna 2x/day  3. Monitor p.o intakes, labs and wound status    Nutrition Assessment:  Patient admission is related to diabetic foot wound- transmetatarsal stump infection with underlying osteomyelitis of the metatarsals. Patient reports a decreased appetite and according to electronic weight records patient has lost 8 lbs in 3 months. Patient appears to be mildly malnourished. Continue Carb Control diet and start Glucerna 2x/day. Monitor p.o intakes, wounds status and labs. Malnutrition Assessment:  Malnutrition Status:  Mild malnutrition    Context:  Acute Illness     Findings of the 6 clinical characteristics of malnutrition:  Energy Intake:  7 - 50% or less of estimated energy requirements for 5 or more days  Weight Loss:  No significant weight loss(5% loss in 3 months)     Body Fat Loss:  Unable to assess     Muscle Mass Loss:  Unable to assess    Fluid Accumulation:  No significant fluid accumulation Extremities   Strength:  Not Performed    Estimated Daily Nutrient Needs:  Energy (kcal):  7245-9801 kcal based on 28-30 kcal/kg; Weight Used for Energy Requirements:  Current     Protein (g):   gm based on 1.3-1.5 gm/kg; Weight Used for Protein Requirements:  Current            Nutrition Related Findings:  LLE edema. Status post Incision and Drainage of left foot with revision of TMA 8/24/2020      Wounds:  Diabetic Ulcer       Current Nutrition Therapies:    DIET CARB CONTROL;   Dietary Nutrition Supplements: Diabetic Oral Supplement    Anthropometric Measures:  · Height: 6' 0.01\" (182.9 cm)  · Current Body Weight: 153 lb (69.4 kg)   · Admission Body Weight: 164 lb (74.4 kg)(not determined)    · Usual Body Weight: 161 lb (73 kg)     · Ideal Body Weight: 178 lbs; % Ideal Body Weight 86 % · BMI: 20.7  · BMI Categories: Normal Weight (BMI 18.5-24. 9)       Nutrition Diagnosis:   · Mild malnutrition related to inadequate protein-energy intake as evidenced by intake 26-50%, weight loss, intake 51-75%      Nutrition Interventions:   Food and/or Nutrient Delivery:  Continue Current Diet, Start Oral Nutrition Supplement  Nutrition Education/Counseling:  Education not indicated   Coordination of Nutrition Care:  Continue to monitor while inpatient    Goals:  PO intakes are greater than 75% at meals       Nutrition Monitoring and Evaluation:   Behavioral-Environmental Outcomes:  Knowledge or Skill   Food/Nutrient Intake Outcomes:  Food and Nutrient Intake, Supplement Intake  Physical Signs/Symptoms Outcomes:  Biochemical Data, Fluid Status or Edema, Skin, Weight     Discharge Planning:    Continue current diet, Continue Oral Nutrition Supplement           Saranya GONZALEZ, RDN, LDN  Lead Clinical Dietitian  RD Office Phone [phone]
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
## 2020-11-04 NOTE — PLAN OF CARE
Nutrition Problem #1: Mild malnutrition  Intervention: Food and/or Nutrient Delivery: Continue Current Diet, Start Oral Nutrition Supplement  Nutritional Goals: PO intakes are greater than 75% at meals

## 2020-11-04 NOTE — PLAN OF CARE
Problem: Pain:  Goal: Pain level will decrease  Description: Pain level will decrease  11/4/2020 0447 by Jazmin Miller RN  Outcome: Ongoing  11/3/2020 1516 by Yeni Salazar RN  Outcome: Ongoing  Goal: Control of acute pain  Description: Control of acute pain  Outcome: Ongoing  Note: Patient took pain  medication as needed and stated it was helping control his pain. Goal: Control of chronic pain  Description: Control of chronic pain  Outcome: Ongoing     Problem: Skin Integrity:  Goal: Will show no infection signs and symptoms  Description: Will show no infection signs and symptoms  11/4/2020 0447 by Jazmin Miller RN  Outcome: Ongoing  11/3/2020 1516 by Yeni Salazar RN  Outcome: Ongoing  Goal: Absence of new skin breakdown  Description: Absence of new skin breakdown  Outcome: Ongoing  Note: No new skin breakdown noted at this time. Will continue to monitor.       Problem: Falls - Risk of:  Goal: Will remain free from falls  Description: Will remain free from falls  11/4/2020 0447 by Jazmin Miller RN  Outcome: Ongoing  11/3/2020 1516 by Yeni Salazar RN  Outcome: Ongoing  Goal: Absence of physical injury  Description: Absence of physical injury  Outcome: Ongoing

## 2020-11-04 NOTE — PROGRESS NOTES
[facility]      NAME:  Zen Mclean RECORD NUMBER:  [id number]  AGE: 63 y.o. GENDER: male  : 1955  TODAY'S DATE:  2020      [facility] nursing consult noted. The patient's wound care needs are being met by podiatric service. Will sign off case. Please call or reconsult if further needs or concerns arise. Thank you.       Chris BRYANTN, RN, [facility] Balbir Galindo [facility]  Wound, Ostomy, and Continence Nursing  [phone]
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
## 2020-11-04 NOTE — FLOWSHEET NOTE
Patient was busy with RN.  shared in silent prayer.    leaves prayer card for possible follow up.     11/03/20 2035   Encounter Summary   Services provided to: Patient not available   Referral/Consult From: Rounding   Continue Visiting   (11-3-20 PT: with RN)   Complexity of Encounter Low   Length of Encounter 15 minutes   Routine   Type Initial   Assessment Calm   Intervention Prayer

## 2020-11-04 NOTE — DISCHARGE INSTR - COC
Continuity of Care Form    Patient Name: Serafni Coleman   :  1955  MRN:  [id number]    Admit date:  11/3/2020  Discharge date:  2020    Code Status Order: Full Code   Advance Directives:   Advance Care Flowsheet Documentation     Date/Time Healthcare Directive Type of Healthcare Directive Copy in [address] Agent's Name Healthcare Agent's Phone Number    20 1004  No, patient does not have an advance directive for healthcare treatment -- -- -- -- --          Admitting Physician:  Dalila Nunez MD  PCP: Bessy Charlton DO    Discharging Nurse: Eva Hernandez formerly [facility] Unit/Room#:   Discharging Unit Phone Number: [phone]    Emergency Contact:   Extended Emergency Contact Information  Primary Emergency Contact: Nini Hicks  Address: [place]  Home Phone: [phone]  Relation: Parent  Secondary Emergency Contact: R Tradição [facility] of [address]  Phone: [phone]  Mobile Phone: [phone]  Relation: Child    Past Surgical History:  Past Surgical History:   Procedure Laterality Date    COLONOSCOPY  2015    hyperplastic polyp    COLONOSCOPY  2017    ESOPHAGECTOMY      cancer    FOOT SURGERY Right 2016    I & D heel    FOOT SURGERY Right 2016    I & D    FRACTURE SURGERY Left 2015    humerus left, left leg    IR INS PICC VAD W SQ PORT GREATER THAN 5  2020    IR INS PICC VAD W SQ PORT GREATER THAN 5 2020 Antonio Geronimo MD [facility]ED[facility]    LEG AMPUTATION BELOW KNEE Right 2017    TOE AMPUTATION Right     rt 3rd through 5th digits    TOE AMPUTATION Left 2016    left foot 5th toe    TOE AMPUTATION Left 2020    FOOT TRANSMETATARSAL  AMPUTATION - AND LEFT PECUTANEOUS TENDO ACHILLES LENGTHENING performed by Dora Mcallister DPM at [address] Left 2020    REVISION  TRANSMETATARSAL AMPUTATION WITH DEBRIDEMENT. performed by Kaila Jean DPM at [facility]      5/14/13- with dilation    VASCULAR SURGERY Right 01/16/2017    foot guillotine amputation       Immunization History:   Immunization History   Administered Date(s) Administered    Influenza Vaccine, unspecified formulation 10/10/2016    Influenza Virus Vaccine 11/03/2014, 10/29/2015    Influenza, Gelregis Elys, IM, (6 mo and older Fluzone, Flulaval, Fluarix and 3 yrs and older Afluria) 10/10/2016    Pneumococcal Polysaccharide ([id number]) 09/05/2012, 10/29/2015    Tdap (Boostrix, Adacel) 07/01/2019       Active Problems:  Patient Active Problem List   Diagnosis Code    Type 2 diabetes mellitus with diabetic polyneuropathy, with long-term current use of insulin ([address] E11.42, Z79.4    Neuropathic pain, leg M79.2    Diabetic polyneuropathy ([facility] E11.42    Allergic rhinitis J30.9    Tobacco dependence F17.200    Edentulous K08.109    Dysphagia R13.10    Lung nodules R91.8    Esophageal cancer ([facility]) C15.9    Fracture of humerus, left, closed S42.302A    Closed fracture of humerus S42.309A    DM type 2 with diabetic peripheral neuropathy ([facility]) E11.42    Chronic obstructive pulmonary disease ([facility]) J44.9    Dyslipidemia E78.5    Gastroesophageal reflux disease K21.9    Chronic pain syndrome G89.4    Marijuana use F12.90    History of colon polyps Z86.010    Cellulitis of right heel L03.115    Functional diarrhea K59.1    Sepsis due to methicillin resistant Staphylococcus aureus (MRSA) ([facility]) A41.02    History of esophageal cancer Z85.01    Osteomyelitis, chronic, ankle or foot ([facility]) M86.679    Peripheral artery disease ([facility]) I73.9    Other pulmonary embolism without acute cor pulmonale ([facility]) I26.99    Carotid stenosis, asymptomatic, bilateral I65.23    Lower limb amputation status Z89.619    Fx humeral neck, right, closed, initial encounter S42.211A    Transient hypotension I95.9    Constipation due to opioid therapy K59.03, T40.2X5A    Acute kidney injury ([address] N17.9    Diabetic ulcer of left midfoot associated with type 2 diabetes mellitus, with fat layer exposed ([address] E11.621, Z35.474    Complication of below knee amputation stump (HCC) T87.9    COPD exacerbation ([facility]) J44.1    Hyponatremia E87.1    Pain, phantom limb ([address] G54.6    Below-knee amputation of right lower extremity ([address] P80.826V    Moderate protein malnutrition ([address] E44.0    Essential hypertension I10    Uncontrolled type 2 diabetes mellitus with hyperglycemia ([address] E11.65    History of pulmonary embolism - 2017 Z86. 80    Atelectasis J98.11    Uncontrolled type 2 diabetes mellitus with foot ulcer ([facility]) E11.621, L97.509, E11.65    Azotemia R79.89    Anemia, normocytic normochromic D64.9    Osteomyelitis of fourth phalange of left foot ([facility]) M86.9    Drug-induced constipation K59.03    Osteomyelitis ([facility]) M86.9    Diabetic foot infection ([facility]) E11.628, L08.9    Noncompliance Z91.19    Type 1 diabetes mellitus with diabetic foot infection ([address] E10.628, L08.9    Acute osteomyelitis of left foot ([facility]) M86.172    Cellulitis of left foot L03. 116    Mild malnutrition ([address] E44.1       Isolation/Infection:   Isolation          Contact        Patient Infection Status     Infection Onset Added Last Indicated Last Indicated By Review Planned Expiration Resolved Resolved By    VRE 08/24/20 08/27/20 08/24/20 Culture, Anaerobic and Aerobic        Foot - 8/2020    Resolved    COVID-19 Rule Out 08/22/20 08/22/20 08/23/20 COVID-19 (Ordered)   08/23/20 Rule-Out Test Resulted    COVID-19 Rule Out 08/01/20 08/01/20 08/01/20 COVID-19 (Ordered)   08/02/20 Shahab Oliveira RN    Test discontinued - negative result this admission    MRSA  01/02/17 01/02/17 Shahab Oliveira RN   07/31/20 Jeananne Oliveira, RN    2 negative nasal screen - 2020  Foot - 6/2018          Nurse Assessment:  Last Vital Signs: BP (!) 147/76   Pulse 90   Temp 98.3 °F (36.8 °C) (Oral)   Resp 18   Ht 6' 0.01\" (1.829 m)   Wt 165 lb 9.6 oz (75.1 kg)   SpO2 95%   BMI 22.45 kg/m²     Last documented pain score (0-10 scale): Pain Level: 7  Last Weight:   Wt Readings from Last 1 Encounters:   11/11/20 165 lb 9.6 oz (75.1 kg)     Mental Status:  oriented and alert    IV Access:  - None    Nursing Mobility/ADLs:  Walking   Independent  Transfer  Independent  Bathing  Assisted  Dressing  Independent  [address]  Med Delivery   whole    Wound Care Documentation and Therapy:  Wound 07/28/20 Other (Comment) Left; Other (Comment) (Active)   Wound Image      11/10/20 1125   Dressing Status Clean;Dry; Intact 11/11/20 0800   Wound Cleansed Cleansed with saline 11/10/20 1125   Dressing/Treatment Dry dressing; Iodoform gauze;ABD;Gauze dressing/dressing sponge 11/11/20 0800   Dressing Change Due 11/11/20 11/11/20 0800   Wound Assessment Other (Comment); Dusky; Pale granulation tissue 11/11/20 0800   Drainage Amount None 11/11/20 0800   Drainage Description Serosanguinous 11/11/20 0800   Odor None 11/11/20 0800   Odalys-wound Assessment Other (Comment) 11/11/20 0800   Number of days: 106        Elimination:  Continence:   · Bowel: Yes  · Bladder: Yes  Urinary Catheter: None   Colostomy/Ileostomy/Ileal Conduit: No       Date of Last BM: 11/9/20    Intake/Output Summary (Last 24 hours) at 11/11/2020 1538  Last data filed at 11/11/2020 1124  Gross per 24 hour   Intake --   Output 1150 ml   Net -1150 ml     I/O last 3 completed shifts:  In: -   Out: 1150 [Urine:1150]    Safety Concerns:      At Risk for Falls    Impairments/Disabilities:      Amputation - right BKA, left TMA    Nutrition Therapy:  Current Nutrition Therapy:   - Oral Diet:  Carb Control 4 carbs/meal (1800kcals/day)    Routes of Feeding: Oral  Liquids: No Restrictions  Daily Fluid Restriction: no  Last Modified Barium Swallow with Video (Video Swallowing Test): not done    Treatments at the Time of Hospital 11/10/20 at 1:48 PM EST
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
## 2020-11-04 NOTE — PROGRESS NOTES
Progress Note  Podiatric Medicine and Surgery     Subjective     CC: Left foot infection    Patient seen and examined at bedside. No acute events overnight. Afebrile, vital signs stable   Pain controlled. HPI :  Mike Chang is a 63 y.o. male seen at [facility] [address] for a wound on the left foot. The patient states the wound has been getting progressively worse with increasing drainage, edema, erythema and pain. The patient has daily home health care changing his dressings, the nurse advised him to go to the ED for increased drainage to the left foot wound. The patient is well known to Dr. Felisa Stahl, and was last seen in their office on 10/7/2020. The patient was advised to follow-up within 1 week however has not followed up since that time. 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 Their last HgbA1c was 13.0% as of July 2020. The patient states they have been feeling nauseated with chills. The patient denies any fever, vomiting, or SOB. The patient denies any other pedal complaints. Of note, the patient has a right below-knee amputation and known peripheral arterial disease. The patient underwent revascularization of the left lower extremity on 7/29/2020 with Dr. Mela Juarez. ROS: Denies N/V/F/C/SOB/CP. Otherwise negative except at stated in the HPI.      Medications:  Scheduled Meds:   apixaban  5 mg Oral BID    atorvastatin  40 mg Oral Nightly    dilTIAZem  60 mg Oral 4 times per day    famotidine  20 mg Oral BID    insulin glargine  70 Units Subcutaneous Nightly    insulin lispro  0-12 Units Subcutaneous TID     insulin lispro  0-6 Units Subcutaneous Nightly    metFORMIN  500 mg Oral BID WC    nortriptyline  50 mg Oral Nightly    sodium chloride flush  10 mL Intravenous 2 times per day    piperacillin-tazobactam  3.375 g Intravenous Q8H    vancomycin (VANCOCIN) intermittent dosing (placeholder)   Other RX Placeholder    vancomycin  1,250 mg Intravenous Q12H Continuous Infusions:   sodium chloride 75 mL/hr at 20 1100    dextrose         PRN Meds:albuterol sulfate HFA, docusate sodium, sodium chloride flush, potassium chloride **OR** potassium alternative oral replacement **OR** potassium chloride, magnesium sulfate, acetaminophen **OR** acetaminophen, polyethylene glycol, promethazine **OR** ondansetron, nicotine, oxyCODONE-acetaminophen, morphine **OR** morphine, glucose, dextrose, glucagon (rDNA), dextrose    Objective     Vitals:  Patient Vitals for the past 8 hrs:   Weight   20 0440 153 lb 4.8 oz (69.5 kg)     Average, Min, and Max for last 24 hours Vitals:  TEMPERATURE:  Temp  Av.9 °F (36.6 °C)  Min: 97.5 °F (36.4 °C)  Max: 98.2 °F (36.8 °C)    RESPIRATIONS RANGE: Resp  Av.4  Min: 15  Max: 20    PULSE RANGE: Pulse  Av.4  Min: 84  Max: 94    BLOOD PRESSURE RANGE:  Systolic (55KDQ), DTL:892 , Min:138 , VJJ:164   ; Diastolic (75NVK), PIB:77, Min:62, Max:79      PULSE OXIMETRY RANGE: SpO2  Av.6 %  Min: 93 %  Max: 96 %    I/O last 3 completed shifts:  In: -   Out: 650 [Urine:650]    CBC:  Recent Labs     20  0630 20  0550   WBC 11.6* 12.0*   HGB 12.5* 11.1*   HCT 40.6* 36.4*    367   CRP 67.9*  --         BMP:  Recent Labs     20  0630 20  0550   * 139   K 4.4 3.8   CL 96* 104   CO2 25 27   BUN 16 15   CREATININE 0.82 0.85   GLUCOSE 334* 187*   CALCIUM 9.4 8.7        Coags:  No results for input(s): APTT, PROT, INR in the last 72 hours. Lab Results   Component Value Date    SEDRATE 53 (H) 2020     Recent Labs     20  0630   CRP 67.9*       Lower Extremity Physical Exam:  Vascular: DP and PT pulses are Palpable . CFT <3 seconds to all digits. Hair growth is absent to the level of the digits.   Mild non-pitting edema to the left lower extremity.       Neuro: Saph/sural/SP/DP/plantar sensation diminished to light touch.     Musculoskeletal: Muscle strength is 5/5 to all lower extremity muscle groups. Gross deformity is s/p R BKA, L TMA.     Dermatologic: Full thickness ulcer #1 located L distal medial TMA stump and measures approximately 1.0cm x 1.0cm x 0.5cm. The wound base is fibro-necrotic. Periwound skin is erythematous. No drainage noted with minimal associated mal odor. Erythema noted dorsally with mild associated increase in warmth. Wound probes directly to remnant first metatarsal.  Does not sinus track, or undermine. No fluctuance, crepitus, or induration. Interdigital maceration absent. Clinical Images: see media panel      Imaging:   CT FOOT LEFT WO CONTRAST   Final Result   1. Soft tissue defect and soft tissue gas in a sinus tract extending to and   exposing the distal resection margin of the residual 1st metatarsal   consistent with osteomyelitis. Possible early osseous destruction at the   resection margin of residual of the distal 1st metatarsal.  Further   evaluation with MRI without and with contrast can be performed to evaluate   the extent of the probable osteomyelitis assuming there are no   contraindications to MRI or contrast.   2. Suspected low-grade interstitial tearing and tendinosis of the distal   Achilles tendon. 3. Suspected partial split tearing of the posterior peroneus brevis tendon. 4. Mild edema in the subcutaneous fat about the foot. 5. Severe thinning of the soft tissues along the distal 5th metatarsal   resection margin. 6. Degenerative changes as detailed above. Diffuse osteopenia. 7. Evidence of prior transmetatarsal amputation. XR FOOT LEFT (MIN 3 VIEWS)   Final Result   Status post transmetatarsal amputation. Periosteal new bone formation is   noted about the 1st, 2nd and 3rd metatarsal and the amputation margin of the   1st metatarsal now appears irregular. The possibility of underlying   osteomyelitis should be considered and further evaluated with MRI.              Cultures:  11/3 Wound Culture: NGTD    Assessment Josr Jean is a 63 y.o. male with   1. Jones grade 3 ulcer, left foot  2. Osteomyelitis, left foot  3. S/p L TMA, R BKA  4. Cellulitis, LLE  5. Type II DM with secondary peripheral neuropathy  6. PAD s/p LLE re-vascularization (DOS: 7/29/2020)    Principal Problem:    Acute osteomyelitis of left foot ([facility])  Active Problems:    Dyslipidemia    Gastroesophageal reflux disease    Chronic pain syndrome    Hyperglycemia    Essential hypertension    Diabetic foot infection ([facility])    Type 1 diabetes mellitus with diabetic foot infection ([facility] [address]    Cellulitis of left foot  Resolved Problems:    COPD (chronic obstructive pulmonary disease) ([facility])    PVD (peripheral vascular disease) ([facility] [address]       Plan     · Patient examined and evaluated at bedside. · Treatment options discussed in detail with the patient. · Radiographs reviewed and discussed in detail with the patient. ? No apparent soft tissue emphysema or osteomyelitis appreciated. · CT of the LLE to rule out soft tissue emphysema. ? Focal soft tissue emphysema to the distal medial TMA stump likely due to the open wound being exposed to air and less likely gas gangrene. Discussed with Dr. Melissa Steve reading radiologist who is in agreement. · Likely OM of remnant 1st metatarsal given positive probe to bone. · NIVS from 7/31/2020 -- HILARY 1.17  · Plan for admission for IV abx, medical management per Internal Medicine. · Abx: Rec Vanc/Zosyn  · No bacterial growth from cultures, suggest OP IV abx via PICC given chronic OM of L 1st metatarsal.  · Dressing applied to Left foot: 1/4\" iodoform packing, DSD  ? Continue dressings HHC daily. · HWB to Left lower extremity. · Discussed with  Dr. Simeon Bender. Kristyn Araya DPM   Podiatric Medicine & Surgery   11/4/2020 at 7:38 AM     Senior Resident Statement:    I have discussed the case, including pertinent history and exam findings with the intern. I agree with the assessment, plan and orders as documented by the intern. Any changes were made in the note above by me. Chronic osteomyelitis to the left 1st metatarsal, s/p L TMA . Given the patient already has a BKA on the RLE recommend pursing long term IV antibiotics vs. Possibility of another BKA on the left.     Electronically signed by Rosario Villanueva DPM on 11/4/2020 at 9:39 AM

## 2020-11-05 LAB
BUN BLDV-MCNC: 17 MG/DL (ref 8–23)
CREAT SERPL-MCNC: 0.88 MG/DL (ref 0.7–1.2)
GFR AFRICAN AMERICAN: >60 ML/MIN
GFR NON-AFRICAN AMERICAN: >60 ML/MIN
GFR SERPL CREATININE-BSD FRML MDRD: NORMAL ML/MIN/{1.73_M2}
GFR SERPL CREATININE-BSD FRML MDRD: NORMAL ML/MIN/{1.73_M2}
GLUCOSE BLD-MCNC: 147 MG/DL (ref 75–110)
GLUCOSE BLD-MCNC: 147 MG/DL (ref 75–110)
GLUCOSE BLD-MCNC: 235 MG/DL (ref 75–110)
GLUCOSE BLD-MCNC: 66 MG/DL (ref 75–110)
TOTAL CK: 28 U/L (ref 39–308)

## 2020-11-05 PROCEDURE — 82550 ASSAY OF CK (CPK): CPT

## 2020-11-05 PROCEDURE — 6370000000 HC RX 637 (ALT 250 FOR IP): Performed by: NURSE PRACTITIONER

## 2020-11-05 PROCEDURE — 1200000000 HC SEMI PRIVATE

## 2020-11-05 PROCEDURE — 36415 COLL VENOUS BLD VENIPUNCTURE: CPT

## 2020-11-05 PROCEDURE — 99232 SBSQ HOSP IP/OBS MODERATE 35: CPT | Performed by: FAMILY MEDICINE

## 2020-11-05 PROCEDURE — 82565 ASSAY OF CREATININE: CPT

## 2020-11-05 PROCEDURE — 99232 SBSQ HOSP IP/OBS MODERATE 35: CPT | Performed by: NURSE PRACTITIONER

## 2020-11-05 PROCEDURE — 2580000003 HC RX 258: Performed by: NURSE PRACTITIONER

## 2020-11-05 PROCEDURE — 82947 ASSAY GLUCOSE BLOOD QUANT: CPT

## 2020-11-05 PROCEDURE — 6360000002 HC RX W HCPCS: Performed by: NURSE PRACTITIONER

## 2020-11-05 PROCEDURE — 84520 ASSAY OF UREA NITROGEN: CPT

## 2020-11-05 RX ORDER — CALCIUM CARBONATE 200(500)MG
1000 TABLET,CHEWABLE ORAL 3 TIMES DAILY PRN
Status: DISCONTINUED | OUTPATIENT
Start: 2020-11-05 | End: 2020-11-12 | Stop reason: HOSPADM

## 2020-11-05 RX ADMIN — METFORMIN HYDROCHLORIDE 500 MG: 500 TABLET ORAL at 08:39

## 2020-11-05 RX ADMIN — DILTIAZEM HYDROCHLORIDE 60 MG: 60 TABLET, FILM COATED ORAL at 05:25

## 2020-11-05 RX ADMIN — ONDANSETRON 4 MG: 2 INJECTION INTRAMUSCULAR; INTRAVENOUS at 11:18

## 2020-11-05 RX ADMIN — NORTRIPTYLINE HYDROCHLORIDE 50 MG: 25 CAPSULE ORAL at 20:32

## 2020-11-05 RX ADMIN — SODIUM CHLORIDE 500 MG: 9 INJECTION, SOLUTION INTRAVENOUS at 14:48

## 2020-11-05 RX ADMIN — APIXABAN 5 MG: 5 TABLET, FILM COATED ORAL at 20:32

## 2020-11-05 RX ADMIN — INSULIN LISPRO 2 UNITS: 100 INJECTION, SOLUTION INTRAVENOUS; SUBCUTANEOUS at 21:43

## 2020-11-05 RX ADMIN — PIPERACILLIN AND TAZOBACTAM 3.38 G: 3; .375 INJECTION, POWDER, LYOPHILIZED, FOR SOLUTION INTRAVENOUS at 09:38

## 2020-11-05 RX ADMIN — ANTACID TABLETS 1000 MG: 500 TABLET, CHEWABLE ORAL at 00:09

## 2020-11-05 RX ADMIN — DILTIAZEM HYDROCHLORIDE 60 MG: 60 TABLET, FILM COATED ORAL at 11:53

## 2020-11-05 RX ADMIN — INSULIN GLARGINE 70 UNITS: 100 INJECTION, SOLUTION SUBCUTANEOUS at 21:43

## 2020-11-05 RX ADMIN — OXYCODONE HYDROCHLORIDE AND ACETAMINOPHEN 2 TABLET: 5; 325 TABLET ORAL at 20:32

## 2020-11-05 RX ADMIN — FAMOTIDINE 20 MG: 20 TABLET, FILM COATED ORAL at 08:39

## 2020-11-05 RX ADMIN — DILTIAZEM HYDROCHLORIDE 60 MG: 60 TABLET, FILM COATED ORAL at 23:48

## 2020-11-05 RX ADMIN — MORPHINE SULFATE 4 MG: 4 INJECTION, SOLUTION INTRAMUSCULAR; INTRAVENOUS at 23:43

## 2020-11-05 RX ADMIN — MORPHINE SULFATE 4 MG: 4 INJECTION, SOLUTION INTRAMUSCULAR; INTRAVENOUS at 21:43

## 2020-11-05 RX ADMIN — APIXABAN 5 MG: 5 TABLET, FILM COATED ORAL at 08:39

## 2020-11-05 RX ADMIN — ATORVASTATIN CALCIUM 40 MG: 40 TABLET, FILM COATED ORAL at 20:32

## 2020-11-05 RX ADMIN — SODIUM CHLORIDE: 9 INJECTION, SOLUTION INTRAVENOUS at 11:18

## 2020-11-05 RX ADMIN — PIPERACILLIN AND TAZOBACTAM 3.38 G: 3; .375 INJECTION, POWDER, LYOPHILIZED, FOR SOLUTION INTRAVENOUS at 01:33

## 2020-11-05 RX ADMIN — MORPHINE SULFATE 4 MG: 4 INJECTION, SOLUTION INTRAMUSCULAR; INTRAVENOUS at 01:34

## 2020-11-05 RX ADMIN — DILTIAZEM HYDROCHLORIDE 60 MG: 60 TABLET, FILM COATED ORAL at 17:50

## 2020-11-05 RX ADMIN — MORPHINE SULFATE 4 MG: 4 INJECTION, SOLUTION INTRAMUSCULAR; INTRAVENOUS at 05:30

## 2020-11-05 RX ADMIN — METFORMIN HYDROCHLORIDE 500 MG: 500 TABLET ORAL at 17:50

## 2020-11-05 RX ADMIN — INSULIN LISPRO 2 UNITS: 100 INJECTION, SOLUTION INTRAVENOUS; SUBCUTANEOUS at 11:53

## 2020-11-05 RX ADMIN — CEFEPIME 2 G: 2 INJECTION, POWDER, FOR SOLUTION INTRAVENOUS at 23:46

## 2020-11-05 RX ADMIN — CEFEPIME 2 G: 2 INJECTION, POWDER, FOR SOLUTION INTRAVENOUS at 11:17

## 2020-11-05 RX ADMIN — INSULIN LISPRO 2 UNITS: 100 INJECTION, SOLUTION INTRAVENOUS; SUBCUTANEOUS at 17:49

## 2020-11-05 RX ADMIN — FAMOTIDINE 20 MG: 20 TABLET, FILM COATED ORAL at 20:32

## 2020-11-05 ASSESSMENT — PAIN DESCRIPTION - PROGRESSION
CLINICAL_PROGRESSION: NOT CHANGED
CLINICAL_PROGRESSION: NOT CHANGED
CLINICAL_PROGRESSION: GRADUALLY IMPROVING

## 2020-11-05 ASSESSMENT — PAIN DESCRIPTION - LOCATION
LOCATION: FOOT

## 2020-11-05 ASSESSMENT — PAIN DESCRIPTION - FREQUENCY
FREQUENCY: CONTINUOUS

## 2020-11-05 ASSESSMENT — PAIN DESCRIPTION - ORIENTATION
ORIENTATION: LEFT

## 2020-11-05 ASSESSMENT — PAIN SCALES - GENERAL
PAINLEVEL_OUTOF10: 3
PAINLEVEL_OUTOF10: 8
PAINLEVEL_OUTOF10: 7
PAINLEVEL_OUTOF10: 8
PAINLEVEL_OUTOF10: 7
PAINLEVEL_OUTOF10: 4
PAINLEVEL_OUTOF10: 8
PAINLEVEL_OUTOF10: 7
PAINLEVEL_OUTOF10: 0

## 2020-11-05 ASSESSMENT — PAIN DESCRIPTION - DESCRIPTORS
DESCRIPTORS: CONSTANT;DISCOMFORT
DESCRIPTORS: ACHING;DISCOMFORT
DESCRIPTORS: ACHING;DISCOMFORT
DESCRIPTORS: CONSTANT;DISCOMFORT
DESCRIPTORS: CONSTANT;DISCOMFORT

## 2020-11-05 ASSESSMENT — ENCOUNTER SYMPTOMS
RESPIRATORY NEGATIVE: 1
GASTROINTESTINAL NEGATIVE: 1

## 2020-11-05 ASSESSMENT — PAIN DESCRIPTION - PAIN TYPE
TYPE: CHRONIC PAIN

## 2020-11-05 ASSESSMENT — PAIN DESCRIPTION - ONSET
ONSET: ON-GOING
ONSET: ON-GOING

## 2020-11-05 NOTE — PROGRESS NOTES
Physical Therapy  DATE: 2020    NAME: Flip Carrion  MRN: [id number]   : 1955    Patient not seen this date for Physical Therapy due to:  [] Blood transfusion in progress  [] Cancel by RN  [] Hemodialysis  [x]  Refusal by Patient  pt vomiting and ill  [] Spine Precautions   [] Strict Bedrest  [] Surgery  [] Testing      [] Other        [] PT being discontinued at this time. Patient independent. No further needs. [] PT being discontinued at this time as the patient has been transferred to hospice care. No further needs.     Meche Brock, PT

## 2020-11-05 NOTE — PROGRESS NOTES
Progress Note  Podiatric Medicine and Surgery     Subjective     CC: Left foot infection    Patient seen and examined at bedside. Patient slightly hypoglycemic overnight, improved with breakfast this morning. Afebrile, vital signs stable   Pain controlled    HPI :  Esequiel Cardenas is a 63 y. o. male seen at [facility] for a wound on the left foot. The patient states the wound has been getting progressively worse with increasing drainage, edema, erythema and pain.  The patient has daily home health care changing his dressings, the nurse advised him to go to the ED for increased drainage to the left foot wound. The patient is well known to Dr. Walters, and was last seen in their office on 10/7/2020.  The patient was advised to follow-up within 1 week however has not followed up since that time.  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 Their last HgbA1c was 13.0% as of July 2020. The patient states they have been feeling nauseated with chills. The patient denies any fever, vomiting, or SOB. The patient denies any other pedal complaints.  Of note, the patient has a right below-knee amputation and known peripheral arterial disease.  The patient underwent revascularization of the left lower extremity on 7/29/2020 with Dr. Quentin Saenz. ROS: Denies N/V/F/C/SOB/CP. Otherwise negative except at stated in the HPI.      Medications:  Scheduled Meds:   daptomycin (CUBICIN) IVPB  500 mg Intravenous Q24H    apixaban  5 mg Oral BID    atorvastatin  40 mg Oral Nightly    dilTIAZem  60 mg Oral 4 times per day    famotidine  20 mg Oral BID    insulin glargine  70 Units Subcutaneous Nightly    insulin lispro  0-12 Units Subcutaneous TID WC    insulin lispro  0-6 Units Subcutaneous Nightly    metFORMIN  500 mg Oral BID WC    nortriptyline  50 mg Oral Nightly    sodium chloride flush  10 mL Intravenous 2 times per day    piperacillin-tazobactam  3.375 g Intravenous Q8H       Continuous digits.  Mild non-pitting edema to the left lower extremity.       Neuro: Saph/sural/SP/DP/plantar sensation diminished to light touch.     Musculoskeletal: Muscle strength is 5/5 to all lower extremity muscle groups. Gross deformity is s/p R BKA, L TMA.     Dermatologic: Full thickness ulcer #1 located L distal medial TMA stump and measures approximately 1.0cm x 1.0cm x 0.5cm. The wound base is fibro-necrotic. Periwound skin is erythematous.  No drainage noted with minimal associated mal odor. Erythema noted dorsally with mild associated increase in warmth.  Wound probes directly to remnant first metatarsal.  Does not sinus track, or undermine. No fluctuance, crepitus, or induration. Interdigital maceration absent. Clinical Images:          Imaging:   CT FOOT LEFT WO CONTRAST   Final Result   1. Soft tissue defect and soft tissue gas in a sinus tract extending to and   exposing the distal resection margin of the residual 1st metatarsal   consistent with osteomyelitis. Possible early osseous destruction at the   resection margin of residual of the distal 1st metatarsal.  Further   evaluation with MRI without and with contrast can be performed to evaluate   the extent of the probable osteomyelitis assuming there are no   contraindications to MRI or contrast.   2. Suspected low-grade interstitial tearing and tendinosis of the distal   Achilles tendon. 3. Suspected partial split tearing of the posterior peroneus brevis tendon. 4. Mild edema in the subcutaneous fat about the foot. 5. Severe thinning of the soft tissues along the distal 5th metatarsal   resection margin. 6. Degenerative changes as detailed above. Diffuse osteopenia. 7. Evidence of prior transmetatarsal amputation. XR FOOT LEFT (MIN 3 VIEWS)   Final Result   Status post transmetatarsal amputation.   Periosteal new bone formation is   noted about the 1st, 2nd and 3rd metatarsal and the amputation margin of the   1st metatarsal now Romeo. Kaleb Villareal DPM   Podiatric Medicine & Surgery   11/5/2020 at 10:03 AM     Senior Resident Statement:    I have discussed the case, including pertinent history and exam findings with the intern. I agree with the assessment, plan and orders as documented by the intern. Any changes were made in the note above by me.       Electronically signed by Leanna Tidwell DPM on 11/5/2020 at 10:25 AM

## 2020-11-05 NOTE — PROGRESS NOTES
[facility]  Progress Note    Jacob Barroso  MRN: [id number]  Date: 11/5/2020  LOS: 2     Reason for F/U :   TMA stump infection    Impression :   1. Status post left transmetatarsal amputation 8/5/2020  2. Transmetatarsal stump infection with underlying osteomyelitis of the metatarsals  · Distal wound at the site of the first metatarsal probes to the bone  · Lateral aspect wound in the fifth metatarsal probes to the bone  · Culture from the wound in August 2020 grew VRE, Proteus mirabilis and coagulase-negative staphylococcus  3. Peripheral vascular disease, status post revascularization 7/29/2020  4. 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    Recommendations:   · Patient continues on daptomycin and Zosyn  · Blood cultures remain no growth to date  · Previous cultures grew VRE, Proteus mirabilis, and coagulase-negative staph coccus  · Primary service has consulted vascular surgery. · Given patient's clinical condition including diabetes mellitus with uncontrolled blood sugars, tobacco abuse, and peripheral vascular disease, I suspect patient will likely ultimately end up with a BKA of the left lower extremity. I have discussed the patient with podiatry and they would like to give patient 6 weeks of IV antibiotics. I have discussed with them as well as the patient that I doubt this will ultimately prevent the need for BKA but patient has opted to pursue IV antibiotics  · We will transition patient to IV daptomycin and cefepime to complete a 6-week course  · PICC line placement, I suspect patient would likely benefit from placement in a facility given the need for IV antibiotics and I am not sure he would be able to manage this at home  · Patient will need weekly labs including CBC, BMP  · Patient can follow-up in ID clinic in 3 to 4 weeks    Infection Control Recommendations:   Contact precautions    Discharge Planning:   Estimated Length of IV antimicrobials:  To be determined  Patient will need Midline Catheter Insertion/ PICC line Insertion: No  Patient will need: Home IV , [place],  SNF,  LTAC: Undetermined  Patient willneed outpatient wound care: No    Medical Decision making / Summary of Stay:   Wei Rutherford is a 63y.o.-year-old male who was initially admitted on 11/3/2020 at the request of his home care nurse due to drainage from his wound. Patient has a known medical history of uncontrolled diabetes mellitus with peripheral neuropathy, peripheral arterial disease, esophageal cancer, COPD, tobacco abuse. 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     Patient is known to our practice from multiple hospitalizations since July 2020. Patient was initially admitted and seen by our service July 28, 2020 for a left hallux gangrene/left foot cellulitis and plantar ulcerations. Patient underwent left superficial femoral, popliteal, tibial, peroneal trunk, and posterior tibial artery atherectomy/balloon angioplasty 7/29/2020. Patient then underwent left TMA with Achilles tendon lengthening 8/5/2020. Patient was discharged 8/7/2022 rehabilitation on doxycycline and Augmentin for 1 week.     8/23/2020 patient returned to [facility].  He states that he never received wound care and that he started to feel funny. Patient was found to have wound dehiscence with concern for underlying osteomyelitis. On 8/24/2020 patient underwent incision and drainage of the left foot with revision of TMA of the left foot. Cultures did grow VRE and Proteus mirabilis as well as a coagulase-negative Staphylococcus. Patient was discharged on oral ciprofloxacin and linezolid through 9/25/2020 to complete a 4-week course.     Patient now tells me that he has been at home, unable to tell me definitively for how long.   He has had what home care come out to assess the foot daily and when they came out to see him yesterday they noticed that his foot was draining and instructed him to come to the emergency department. Patient is not sure how long his foot has looked like this and he does not feel any pain due to peripheral neuropathy. He was unaware that he had a wound on the lateral aspect of his foot. He does not know any fevers or chills. No generalized body aches. No nausea, vomiting or diarrhea. Patient had an x-ray and CT scan of the left foot concerning for residual first metatarsal osteomyelitis. Patient has been started on vancomycin and Zosyn. We were consulted due to the osteomyelitis and to assist with antimicrobial therapy management. Current evaluation:2020    /63   Pulse 85   Temp 98.2 °F (36.8 °C) (Oral)   Resp 15   Ht 6' 0.01\" (1.829 m)   Wt 159 lb (72.1 kg)   SpO2 95%   BMI 21.56 kg/m²     Temperature Range: Temp: 98.2 °F (36.8 °C) Temp  Av °F (36.7 °C)  Min: 97.9 °F (36.6 °C)  Max: 98.2 °F (36.8 °C)    Patient seen and evaluated sitting up in bed. States podiatry was in to see him and states that he is fine and healing. Denies needs at this time. I did discuss with him that I suspect he will ultimately need a BKA and that podiatry would like him to have 6 weeks of IV antibiotics. Patient tells me that he would like to go that route at this point. Review of Systems   Constitutional: Negative. HENT: Negative. Respiratory: Negative. Cardiovascular: Negative. Gastrointestinal: Negative. Genitourinary: Negative. Musculoskeletal: Negative. Skin: Negative. Psychiatric/Behavioral: Negative. Physical Examination :     Physical Exam  Constitutional:       General: He is not in acute distress. Appearance: Normal appearance. He is normal weight. HENT:      Head: Normocephalic and atraumatic. Pulmonary:      Effort: Pulmonary effort is normal. No respiratory distress. Abdominal:      General: There is no distension. Musculoskeletal:      Comments: Left lower extremity with dressing in place  Patient does have a continued wound along the TMA incision, open wound at the first metatarsal that does probe to the bone. Vascular ulceration at the dorsal aspect of the foot that is proximal with mild surrounding erythema. Lateral aspect of the foot with necrotic area as directed below, probes to bone. Right BKA stump   Skin:     General: Skin is warm and dry. Neurological:      General: No focal deficit present. Mental Status: He is alert. Mental status is at baseline. Psychiatric:         Mood and Affect: Mood normal.         Behavior: Behavior normal.                       Laboratory data:   I have independently reviewed the followinglabs:  CBC with Differential:   Recent Labs     11/03/20  0630 11/04/20  0550   WBC 11.6* 12.0*   HGB 12.5* 11.1*   HCT 40.6* 36.4*    367   LYMPHOPCT 14*  --    MONOPCT 6  --      BMP:   Recent Labs     11/03/20  0630 11/04/20  0550 11/05/20  0459   * 139  --    K 4.4 3.8  --    CL 96* 104  --    CO2 25 27  --    BUN 16 15 17   CREATININE 0.82 0.85 0.88     Hepatic Function Panel: No results for input(s): PROT, LABALBU, BILIDIR, IBILI, BILITOT, ALKPHOS, ALT, AST in the last 72 hours. No results found for: PROCAL  Lab Results   Component Value Date    CRP 67.9 11/03/2020    CRP 57.9 09/03/2020    .7 09/01/2020     Lab Results   Component Value Date    SEDRATE 53 (H) 11/03/2020         Lab Results   Component Value Date    DDIMER 0.39 06/29/2020    DDIMER 1.63 12/20/2017    DDIMER 0.68 12/25/2011     Lab Results   Component Value Date    FERRITIN 64 01/25/2014     No results found for: LDH  No results found for: FIBRINOGEN    No results found for requested labs within last 30 days. Lab Results   Component Value Date    COVID19 Not Detected 08/23/2020    COVID19 Not Detected 07/29/2020       No results for input(s): VANCOTROUGH in the last 72 hours.     Imaging Studies:   CT left foot  Impression:          1. Soft tissue defect and soft tissue gas in a sinus tract extending to and   exposing the distal resection margin of the residual 1st metatarsal   consistent with osteomyelitis.  Possible early osseous destruction at the   resection margin of residual of the distal 1st metatarsal.  Further   evaluation with MRI without and with contrast can be performed to evaluate   the extent of the probable osteomyelitis assuming there are no   contraindications to MRI or contrast.   2. Suspected low-grade interstitial tearing and tendinosis of the distal   Achilles tendon. 3. Suspected partial split tearing of the posterior peroneus brevis tendon. 4. Mild edema in the subcutaneous fat about the foot. 5. Severe thinning of the soft tissues along the distal 5th metatarsal   resection margin. 6. Degenerative changes as detailed above.  Diffuse osteopenia. 7. Evidence of prior transmetatarsal amputation. Cultures:     Culture, Blood 1 [[id number]]   Collected: 11/03/20 0630    Order Status: Completed  Specimen: Blood  Updated: 11/05/20 0640     Specimen Description  . BLOOD     Special Requests  10 ML RAC     Culture  NO GROWTH 2 DAYS    Culture, Blood 1 [[id number]]   Collected: 11/03/20 0704    Order Status: Completed  Specimen: Blood  Updated: 11/05/20 0640     Specimen Description  . BLOOD     Special Requests  10ML LAC     Culture  NO GROWTH 2 DAYS    Culture, Anaerobic and Aerobic [[id number]]  (Abnormal)  Collected: 11/03/20 0921    Order Status: Completed  Specimen: Foot  Updated: 11/04/20 2211     Specimen Description  . FOOT LEFT     Special Requests  NOT REPORTED     Direct Exam  FEW NEUTROPHILSAbnormal        NO BACTERIA SEEN     Culture  NORMAL ANNA MARIE          Medications:      daptomycin (CUBICIN) IVPB  500 mg Intravenous Q24H    apixaban  5 mg Oral BID    atorvastatin  40 mg Oral Nightly    dilTIAZem  60 mg Oral 4 times per day    famotidine  20 mg Oral BID    insulin glargine  70 Units Subcutaneous Nightly    insulin lispro  0-12 Units Subcutaneous TID WC    insulin lispro  0-6 Units Subcutaneous Nightly    metFORMIN  500 mg Oral BID WC    nortriptyline  50 mg Oral Nightly    sodium chloride flush  10 mL Intravenous 2 times per day    piperacillin-tazobactam  3.375 g Intravenous Q8H           [facility]  [address] [place] [address]  [employer] messaging  OFFICE: [phone]      Electronically signed by [address] [place] [address], APRN - CNP on 11/5/2020 at 8:22 AM  Thank you for allowing us to participate in the care of this patient. Please call with questions. This note iscreated with the assistance of a speech recognition program.  While intending to generate a document that actually reflects the content of the visit, the document can still have some errors including those of syntax andsound a like substitutions which may escape proof reading. In such instances, actual meaning can be extrapolated by contextual diversion.
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
## 2020-11-05 NOTE — PLAN OF CARE
Problem: Pain:  Goal: Control of acute pain  Description: Control of acute pain  11/4/2020 1050 by Doroteo Skinner, RN  Outcome: Ongoing  Note: Pt reports adequate pain control with current meds  Goal: Control of chronic pain  Description: Control of chronic pain  Outcome: Ongoing     Problem: Skin Integrity:  Goal: Will show no infection signs and symptoms  Description: Will show no infection signs and symptoms  Outcome: Ongoing  Goal: Absence of new skin breakdown  Description: Absence of new skin breakdown  Outcome: Ongoing     Problem: Falls - Risk of:  Goal: Will remain free from falls  Description: Will remain free from falls  11/4/2020 2247 by Anish Fraser RN  Outcome: Ongoing  11/4/2020 1050 by Doroteo Skinner RN  Outcome: Ongoing  Note: Fall safety precautions remain in place, alarms on
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
## 2020-11-06 ENCOUNTER — APPOINTMENT (OUTPATIENT)
Dept: INTERVENTIONAL RADIOLOGY/VASCULAR | Age: 65
DRG: 565 | End: 2020-11-06
Payer: MEDICARE

## 2020-11-06 LAB
GLUCOSE BLD-MCNC: 142 MG/DL (ref 75–110)
GLUCOSE BLD-MCNC: 204 MG/DL (ref 75–110)
GLUCOSE BLD-MCNC: 229 MG/DL (ref 75–110)
GLUCOSE BLD-MCNC: 95 MG/DL (ref 75–110)

## 2020-11-06 PROCEDURE — 36573 INSJ PICC RS&I 5 YR+: CPT | Performed by: RADIOLOGY

## 2020-11-06 PROCEDURE — 99232 SBSQ HOSP IP/OBS MODERATE 35: CPT | Performed by: FAMILY MEDICINE

## 2020-11-06 PROCEDURE — 2580000003 HC RX 258: Performed by: NURSE PRACTITIONER

## 2020-11-06 PROCEDURE — APPSS45 APP SPLIT SHARED TIME 31-45 MINUTES: Performed by: NURSE PRACTITIONER

## 2020-11-06 PROCEDURE — 6370000000 HC RX 637 (ALT 250 FOR IP): Performed by: NURSE PRACTITIONER

## 2020-11-06 PROCEDURE — 6360000002 HC RX W HCPCS: Performed by: NURSE PRACTITIONER

## 2020-11-06 PROCEDURE — 02HV33Z INSERTION OF INFUSION DEVICE INTO SUPERIOR VENA CAVA, PERCUTANEOUS APPROACH: ICD-10-PCS | Performed by: RADIOLOGY

## 2020-11-06 PROCEDURE — C1751 CATH, INF, PER/CENT/MIDLINE: HCPCS

## 2020-11-06 PROCEDURE — 1200000000 HC SEMI PRIVATE

## 2020-11-06 PROCEDURE — 99232 SBSQ HOSP IP/OBS MODERATE 35: CPT | Performed by: INTERNAL MEDICINE

## 2020-11-06 PROCEDURE — 82947 ASSAY GLUCOSE BLOOD QUANT: CPT

## 2020-11-06 RX ADMIN — SODIUM CHLORIDE: 9 INJECTION, SOLUTION INTRAVENOUS at 03:38

## 2020-11-06 RX ADMIN — SODIUM CHLORIDE 500 MG: 9 INJECTION, SOLUTION INTRAVENOUS at 15:34

## 2020-11-06 RX ADMIN — INSULIN LISPRO 2 UNITS: 100 INJECTION, SOLUTION INTRAVENOUS; SUBCUTANEOUS at 21:13

## 2020-11-06 RX ADMIN — ANTACID TABLETS 1000 MG: 500 TABLET, CHEWABLE ORAL at 18:12

## 2020-11-06 RX ADMIN — FAMOTIDINE 20 MG: 20 TABLET, FILM COATED ORAL at 21:12

## 2020-11-06 RX ADMIN — MORPHINE SULFATE 4 MG: 4 INJECTION, SOLUTION INTRAMUSCULAR; INTRAVENOUS at 21:19

## 2020-11-06 RX ADMIN — DILTIAZEM HYDROCHLORIDE 60 MG: 60 TABLET, FILM COATED ORAL at 17:42

## 2020-11-06 RX ADMIN — OXYCODONE HYDROCHLORIDE AND ACETAMINOPHEN 2 TABLET: 5; 325 TABLET ORAL at 18:12

## 2020-11-06 RX ADMIN — NORTRIPTYLINE HYDROCHLORIDE 50 MG: 25 CAPSULE ORAL at 21:12

## 2020-11-06 RX ADMIN — DILTIAZEM HYDROCHLORIDE 60 MG: 60 TABLET, FILM COATED ORAL at 05:41

## 2020-11-06 RX ADMIN — INSULIN LISPRO 4 UNITS: 100 INJECTION, SOLUTION INTRAVENOUS; SUBCUTANEOUS at 17:42

## 2020-11-06 RX ADMIN — CEFEPIME 2 G: 2 INJECTION, POWDER, FOR SOLUTION INTRAVENOUS at 12:04

## 2020-11-06 RX ADMIN — METFORMIN HYDROCHLORIDE 500 MG: 500 TABLET ORAL at 08:57

## 2020-11-06 RX ADMIN — MORPHINE SULFATE 4 MG: 4 INJECTION, SOLUTION INTRAMUSCULAR; INTRAVENOUS at 05:39

## 2020-11-06 RX ADMIN — INSULIN GLARGINE 70 UNITS: 100 INJECTION, SOLUTION SUBCUTANEOUS at 21:13

## 2020-11-06 RX ADMIN — APIXABAN 5 MG: 5 TABLET, FILM COATED ORAL at 21:12

## 2020-11-06 RX ADMIN — MORPHINE SULFATE 4 MG: 4 INJECTION, SOLUTION INTRAMUSCULAR; INTRAVENOUS at 23:50

## 2020-11-06 RX ADMIN — APIXABAN 5 MG: 5 TABLET, FILM COATED ORAL at 08:57

## 2020-11-06 RX ADMIN — DILTIAZEM HYDROCHLORIDE 60 MG: 60 TABLET, FILM COATED ORAL at 23:53

## 2020-11-06 RX ADMIN — INSULIN LISPRO 2 UNITS: 100 INJECTION, SOLUTION INTRAVENOUS; SUBCUTANEOUS at 12:04

## 2020-11-06 RX ADMIN — DILTIAZEM HYDROCHLORIDE 60 MG: 60 TABLET, FILM COATED ORAL at 12:04

## 2020-11-06 RX ADMIN — CEFEPIME 2 G: 2 INJECTION, POWDER, FOR SOLUTION INTRAVENOUS at 23:54

## 2020-11-06 RX ADMIN — OXYCODONE HYDROCHLORIDE AND ACETAMINOPHEN 2 TABLET: 5; 325 TABLET ORAL at 09:08

## 2020-11-06 RX ADMIN — SODIUM CHLORIDE, PRESERVATIVE FREE 10 ML: 5 INJECTION INTRAVENOUS at 21:23

## 2020-11-06 RX ADMIN — FAMOTIDINE 20 MG: 20 TABLET, FILM COATED ORAL at 08:57

## 2020-11-06 RX ADMIN — SODIUM CHLORIDE: 9 INJECTION, SOLUTION INTRAVENOUS at 21:20

## 2020-11-06 RX ADMIN — METFORMIN HYDROCHLORIDE 500 MG: 500 TABLET ORAL at 17:42

## 2020-11-06 RX ADMIN — ATORVASTATIN CALCIUM 40 MG: 40 TABLET, FILM COATED ORAL at 21:12

## 2020-11-06 RX ADMIN — OXYCODONE HYDROCHLORIDE AND ACETAMINOPHEN 2 TABLET: 5; 325 TABLET ORAL at 03:37

## 2020-11-06 RX ADMIN — MORPHINE SULFATE 4 MG: 4 INJECTION, SOLUTION INTRAMUSCULAR; INTRAVENOUS at 10:36

## 2020-11-06 RX ADMIN — OXYCODONE HYDROCHLORIDE AND ACETAMINOPHEN 2 TABLET: 5; 325 TABLET ORAL at 12:32

## 2020-11-06 ASSESSMENT — PAIN DESCRIPTION - PROGRESSION
CLINICAL_PROGRESSION: GRADUALLY IMPROVING

## 2020-11-06 ASSESSMENT — PAIN - FUNCTIONAL ASSESSMENT
PAIN_FUNCTIONAL_ASSESSMENT: PREVENTS OR INTERFERES SOME ACTIVE ACTIVITIES AND ADLS

## 2020-11-06 ASSESSMENT — PAIN SCALES - GENERAL
PAINLEVEL_OUTOF10: 8
PAINLEVEL_OUTOF10: 0

## 2020-11-06 ASSESSMENT — PAIN DESCRIPTION - LOCATION
LOCATION: FOOT

## 2020-11-06 ASSESSMENT — PAIN DESCRIPTION - PAIN TYPE
TYPE: CHRONIC PAIN

## 2020-11-06 ASSESSMENT — PAIN DESCRIPTION - FREQUENCY
FREQUENCY: CONTINUOUS

## 2020-11-06 ASSESSMENT — PAIN DESCRIPTION - ORIENTATION
ORIENTATION: LEFT

## 2020-11-06 ASSESSMENT — PAIN DESCRIPTION - ONSET
ONSET: ON-GOING

## 2020-11-06 ASSESSMENT — ENCOUNTER SYMPTOMS
RESPIRATORY NEGATIVE: 1
GASTROINTESTINAL NEGATIVE: 1

## 2020-11-06 ASSESSMENT — PAIN DESCRIPTION - DESCRIPTORS
DESCRIPTORS: CONSTANT;DISCOMFORT
DESCRIPTORS: DISCOMFORT;CONSTANT
DESCRIPTORS: CONSTANT;DISCOMFORT

## 2020-11-06 NOTE — PLAN OF CARE
Problem: Pain:  Goal: Pain level will decrease  11/6/2020 0138 by Marjie Cushing, RN  Outcome: Ongoing     Problem: Pain:  Goal: Control of chronic pain  11/6/2020 0138 by Marjie Cushing, RN  Outcome: Ongoing     Problem: Skin Integrity:  Goal: Absence of new skin breakdown  11/6/2020 0138 by Marjie Cushing, RN  Outcome: Ongoing     Problem: Falls - Risk of:  Goal: Will remain free from falls  11/6/2020 0138 by Marjie Cushing, RN  Outcome: Ongoing     Problem: Nutrition  Goal: Optimal nutrition therapy  Outcome: Ongoing

## 2020-11-06 NOTE — PROGRESS NOTES
Patient belongings are locked up in the room prior to PICC line insertion. Whit Roger witnessed in room.
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
## 2020-11-06 NOTE — PROGRESS NOTES
[facility]  Progress Note    Hernan King  MRN: [id number]  Date: 11/6/2020  LOS: 3     Reason for F/U :   Diabetic foot infection    Impression :   1. Left transmetatarsal stump infection with osteomyelitis of the first metatarsal and fifth metatarsal  2. Diabetes mellitus with associated neuropathy    Recommendations:   · The patient's current cultures with no organisms but prior cultures in August 2020 had VRE, Proteus mirabilis, and coagulase-negative staph  · The patient is currently on cefepime and daptomycin to complete a 6-week course of therapy through December 15, 2020  · CBC , BMP, CPK and CRP every Monday  · Follow up in ID clinic with Dr. Josette Marin in 3-4 weeks.   · The plan is for discharge to skilled nursing facility when a bed is available    Infection Control Recommendations:   Contact precautions    Discharge Planning:   Estimated Length of IV antimicrobials: December 15, 2020  Patient will need PICC line Insertion  Patient will need: SNF  Patient willneed outpatient wound care: Yes    Medical Decision making / Summary of Stay:   Sridevi Cardenas is a 63y.o.-year-old male who was initially admitted on 11/3/2020 at the request of his home care nurse due to drainage from his wound.  Patient has a known medical history of uncontrolled diabetes mellitus with peripheral neuropathy, peripheral arterial disease, esophageal cancer, COPD, tobacco abuse. Kaushik Pool has a history of right below the knee amputation as well as left fifth ray amputation with subsequent TMA due to gangrene.     Patient is known to our practice from multiple hospitalizations since July 2020. Kaushik Pool was initially admitted and seen by our service July 28, 2020 for a left hallux gangrene/left foot cellulitis and plantar ulcerations.  Patient underwent left superficial femoral, popliteal, tibial, peroneal trunk, and posterior tibial artery atherectomy/balloon angioplasty 7/29/2020.  Patient then underwent left TMA with Achilles tendon lengthening 2020. Kareen Mckinnon was discharged 2022 rehabilitation on doxycycline and Augmentin for 1 week.     2020 patient returned to [address] [facility] states that he never received wound care and that he started to feel funny. Kareen Mckinnon was found to have wound dehiscence with concern for underlying osteomyelitis.  On 2020 patient underwent incision and drainage of the left foot with revision of TMA of the left foot.  Cultures did grow VRE and Proteus mirabilis as well as a coagulase-negative Staphylococcus.  Patient was discharged on oral ciprofloxacin and linezolid through 2020 to complete a 4-week course.     Patient now tells me that he has been at home, unable to tell me definitively for how long. Michael Maier has had what home care come out to assess the foot daily and when they came out to see him yesterday they noticed that his foot was draining and instructed him to come to the emergency department. Kareen Mckinnon is not sure how long his foot has looked like this and he does not feel any pain due to peripheral neuropathy.  He was unaware that he had a wound on the lateral aspect of his foot.  He does not know any fevers or chills.  No generalized body aches.  No nausea, vomiting or diarrhea.  Patient had an x-ray and CT scan of the left foot concerning for residual first metatarsal osteomyelitis.  Patient has been started on vancomycin and Zosyn.  We were consulted due to the osteomyelitis and to assist with antimicrobial therapy management. Current evaluation:2020    /66   Pulse 89   Temp 97.7 °F (36.5 °C) (Oral)   Resp 16   Ht 6' 0.01\" (1.829 m)   Wt 162 lb (73.5 kg)   SpO2 95%   BMI 21.97 kg/m²     Temperature Range: Temp: 97.7 °F (36.5 °C) Temp  Av.1 °F (36.7 °C)  Min: 97.7 °F (36.5 °C)  Max: 98.6 °F (37 °C)  The patient is seen and evaluated at bedside and he is awake and alert in no acute distress. No subjective fever or chills.   No cough or shortness of breath. Review of Systems   Constitutional: Negative. Respiratory: Negative. Cardiovascular: Negative. Gastrointestinal: Negative. Genitourinary: Negative. Musculoskeletal: Negative. Skin: Positive for wound. Neurological: Negative. Physical Examination :     Physical Exam  Constitutional:       Appearance: He is well-developed. HENT:      Head: Normocephalic and atraumatic. Neck:      Musculoskeletal: Normal range of motion and neck supple. Cardiovascular:      Rate and Rhythm: Normal rate. Heart sounds: Normal heart sounds. No friction rub. No gallop. Pulmonary:      Effort: Pulmonary effort is normal.      Breath sounds: Normal breath sounds. No wheezing. Abdominal:      General: Bowel sounds are normal.      Palpations: Abdomen is soft. There is no mass. Tenderness: There is no abdominal tenderness. Comments: Well-healed midline abdominal scar   Musculoskeletal: Normal range of motion. Comments: Right BKA stump intact   Lymphadenopathy:      Cervical: No cervical adenopathy. Skin:     General: Skin is warm and dry. Comments: Dressing of the left foot not removed   Neurological:      Mental Status: He is alert and oriented to person, place, and time. Laboratory data:   I have independently reviewed the followinglabs:  CBC with Differential:   Recent Labs     11/04/20  0550   WBC 12.0*   HGB 11.1*   HCT 36.4*        BMP:   Recent Labs     11/04/20  0550 11/05/20  0459     --    K 3.8  --      --    CO2 27  --    BUN 15 17   CREATININE 0.85 0.88     Hepatic Function Panel: No results for input(s): PROT, LABALBU, BILIDIR, IBILI, BILITOT, ALKPHOS, ALT, AST in the last 72 hours.       No results found for: PROCAL  Lab Results   Component Value Date    CRP 67.9 11/03/2020    CRP 57.9 09/03/2020    .7 09/01/2020     Lab Results   Component Value Date    SEDRATE 53 (H) 11/03/2020         Lab Results Component Value Date    DDIMER 0.39 06/29/2020    DDIMER 1.63 12/20/2017    DDIMER 0.68 12/25/2011     Lab Results   Component Value Date    FERRITIN 64 01/25/2014     No results found for: LDH  No results found for: FIBRINOGEN    No results found for requested labs within last 30 days. Lab Results   Component Value Date    COVID19 Not Detected 08/23/2020    COVID19 Not Detected 07/29/2020       No results for input(s): VANCOTROUGH in the last 72 hours. Imaging Studies:   No new imaging    Cultures:     Culture, Anaerobic and Aerobic [[id number]]  (Abnormal)  Collected: 11/03/20 0921    Order Status: Completed  Specimen: Foot  Updated: 11/06/20 1647     Specimen Description  . FOOT LEFT     Special Requests  NOT REPORTED     Direct Exam  FEW NEUTROPHILSAbnormal        NO BACTERIA SEEN     Culture  NORMAL ANNA MARIE      NO ANAEROBIC ORGANISMS ISOLATED AT 3 DAYSAbnormal      Culture, Blood 1 [[id number]]   Collected: 11/03/20 0630    Order Status: Completed  Specimen: Blood  Updated: 11/06/20 0518     Specimen Description  . BLOOD     Special Requests  10 ML RAC     Culture  NO GROWTH 3 DAYS    Culture, Blood 1 [[id number]]   Collected: 11/03/20 0704    Order Status: Completed  Specimen: Blood  Updated: 11/06/20 0518     Specimen Description  . BLOOD     Special Requests  10ML LAC     Culture  NO GROWTH 3 DAYS          Medications:      cefepime  2 g Intravenous Q12H    daptomycin (CUBICIN) IVPB  500 mg Intravenous Q24H    apixaban  5 mg Oral BID    atorvastatin  40 mg Oral Nightly    dilTIAZem  60 mg Oral 4 times per day    famotidine  20 mg Oral BID    insulin glargine  70 Units Subcutaneous Nightly    insulin lispro  0-12 Units Subcutaneous TID WC    insulin lispro  0-6 Units Subcutaneous Nightly    metFORMIN  500 mg Oral BID WC    nortriptyline  50 mg Oral Nightly    sodium chloride flush  10 mL Intravenous 2 times per day           [facility]  [address]  [employer]  OFFICE: [phone]      Electronically signed by Samara Enciso MD on 11/6/2020 at 3:30 PM  Thank you for allowing us to participate in the care of this patient. Please call with questions. This note iscreated with the assistance of a speech recognition program.  While intending to generate a document that actually reflects the content of the visit, the document can still have some errors including those of syntax andsound a like substitutions which may escape proof reading. In such instances, actual meaning can be extrapolated by contextual diversion.

## 2020-11-06 NOTE — PROGRESS NOTES
Progress Note  Podiatric Medicine and Surgery     Subjective     CC: Left foot infection    Patient seen and examined at bedside. Afebrile, vital signs stable   Pain controlled    HPI :  Zoey Cardenas is a 63 y. o. male seen at [facility]. [facility] for a wound on the left foot. The patient states the wound has been getting progressively worse with increasing drainage, edema, erythema and pain.  The patient has daily home health care changing his dressings, the nurse advised him to go to the ED for increased drainage to the left foot wound. The patient is well known to Dr. Walters, and was last seen in their office on 10/7/2020.  The patient was advised to follow-up within 1 week however has not followed up since that time.  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 Their last HgbA1c was 13.0% as of July 2020. The patient states they have been feeling nauseated with chills. The patient denies any fever, vomiting, or SOB. The patient denies any other pedal complaints.  Of note, the patient has a right below-knee amputation and known peripheral arterial disease.  The patient underwent revascularization of the left lower extremity on 7/29/2020 with Dr. Jigar Blair. ROS: Denies N/V/F/C/SOB/CP. Otherwise negative except at stated in the HPI.      Medications:  Scheduled Meds:   cefepime  2 g Intravenous Q12H    daptomycin (CUBICIN) IVPB  500 mg Intravenous Q24H    apixaban  5 mg Oral BID    atorvastatin  40 mg Oral Nightly    dilTIAZem  60 mg Oral 4 times per day    famotidine  20 mg Oral BID    insulin glargine  70 Units Subcutaneous Nightly    insulin lispro  0-12 Units Subcutaneous TID     insulin lispro  0-6 Units Subcutaneous Nightly    metFORMIN  500 mg Oral BID WC    nortriptyline  50 mg Oral Nightly    sodium chloride flush  10 mL Intravenous 2 times per day       Continuous Infusions:   sodium chloride 75 mL/hr at 11/06/20 0338    dextrose         PRN Meds:calcium carbonate, groups. Gross deformity is s/p R BKA, L TMA.     Dermatologic: Full thickness ulcer #1 located L distal medial TMA stump and measures approximately 1.0cm x 1.0cm x 0.5cm. The wound base is fibro-necrotic. Periwound skin is erythematous.  Mild serous drainage noted with no associated mal odor. Improvement in erythema noted dorsally with mild associated increase in warmth  Wound probes directly to remnant first metatarsal.  Does not sinus track, or undermine. No fluctuance, crepitus, or induration. Interdigital maceration absent. Clinical Images:                  Imaging:   CT FOOT LEFT WO CONTRAST   Final Result   1. Soft tissue defect and soft tissue gas in a sinus tract extending to and   exposing the distal resection margin of the residual 1st metatarsal   consistent with osteomyelitis. Possible early osseous destruction at the   resection margin of residual of the distal 1st metatarsal.  Further   evaluation with MRI without and with contrast can be performed to evaluate   the extent of the probable osteomyelitis assuming there are no   contraindications to MRI or contrast.   2. Suspected low-grade interstitial tearing and tendinosis of the distal   Achilles tendon. 3. Suspected partial split tearing of the posterior peroneus brevis tendon. 4. Mild edema in the subcutaneous fat about the foot. 5. Severe thinning of the soft tissues along the distal 5th metatarsal   resection margin. 6. Degenerative changes as detailed above. Diffuse osteopenia. 7. Evidence of prior transmetatarsal amputation. XR FOOT LEFT (MIN 3 VIEWS)   Final Result   Status post transmetatarsal amputation. Periosteal new bone formation is   noted about the 1st, 2nd and 3rd metatarsal and the amputation margin of the   1st metatarsal now appears irregular. The possibility of underlying   osteomyelitis should be considered and further evaluated with MRI.          IR INSERT PICC VAD W SQ PORT >5 YEARS    (Results Pending)       Cultures:   11/3 Wound Culture: NGTD    Assessment   Donato Mendoza is a 63 y.o. male with   1. Torres Keshia grade 3 ulcer, left foot  2. Osteomyelitis, left foot  3. S/p L TMA, R BKA  4. Cellulitis, LLE  5. Type II DM with secondary peripheral neuropathy  6. PAD s/p LLE re-vascularization (DOS: 7/29/2020)    Principal Problem:    Acute osteomyelitis of left foot (HCC)  Active Problems:    Dyslipidemia    Gastroesophageal reflux disease    Chronic pain syndrome    Hyperglycemia    Essential hypertension    Diabetic foot infection (HCC)    Type 1 diabetes mellitus with diabetic foot infection ([facility]    Cellulitis of left foot    Mild malnutrition ([facility])  Resolved Problems:    COPD (chronic obstructive pulmonary disease) ([facility])    PVD (peripheral vascular disease) ([facility]       Plan     · Patient examined and evaluated at bedside. · Treatment options discussed in detail with the patient. · Radiographs reviewed and discussed in detail with the patient. ? No apparent soft tissue emphysema or osteomyelitis appreciated. · CT of the LLE to rule out soft tissue emphysema. ? Focal soft tissue emphysema to the distal medial TMA stump likely due to the open wound being exposed to air and less likely gas gangrene. Discussed with Dr. Aleena Torres reading radiologist who is in agreement. · Likely OM of remnant 1st metatarsal given positive probe to bone. · NIVS from 7/31/2020 -- HILARY 1.17  · ID on board - Daptomycin/cefepime. · Dressing applied to Left foot: 1/4\" iodoform packing, DSD  · HWB to Left lower extremity. · Patient cleared from a podiatry standpoint pending PICC placement and placement  To facility. Patient can follow-up in [facility] podiatry clinic within 1 week, call for appointment. · Discussed with  Dr. Blade Gomez.     Tomeka Rubin DPM   Podiatric Medicine & Surgery   11/6/2020 at 11:01 AM     Senior Resident Statement:  I have discussed the case, including pertinent history and exam findings with the resident. I agree with the assessment, plan and orders as documented by the intern. Any changes were made in the note above.        Electronically signed by Lashawn Zavala DPM on 11/6/2020 at 1:43 PM

## 2020-11-06 NOTE — PROGRESS NOTES
rate and rhythm, no murmur,  Abdomen: soft, non-tender, non-distended, bowel sounds present all four quadrants, no masses, hepatomegaly, splenomegaly or aortic enlargement  Extremities: peripheral pulses by Doppler on left, left TMA site mostly healed with small open area with packing. Right BKA site healed  Skin: no gross lesions, rashes, or induration    Assessment:   1. 62 y/o noncompliant diabetic with left TMA site infection/osteo  2.  Adequate lower extremity arterial flow    Patient Active Problem List:     Type 2 diabetes mellitus with diabetic polyneuropathy, with long-term current use of insulin (HCC)     Neuropathic pain, leg     Diabetic polyneuropathy (HCC)     Allergic rhinitis     Tobacco dependence     Edentulous     Dysphagia     Lung nodules     Esophageal cancer (HCC)     Fracture of humerus, left, closed     Closed fracture of humerus     DM type 2 with diabetic peripheral neuropathy (HCC)     COPD without exacerbation (HCC)     Dyslipidemia     Gastroesophageal reflux disease     Chronic pain syndrome     Marijuana use     History of colon polyps     Cellulitis of right heel     Functional diarrhea     Sepsis due to methicillin resistant Staphylococcus aureus (MRSA) (HCC)     History of esophageal cancer     Osteomyelitis, chronic, ankle or foot ([address]     Peripheral artery disease ([address]     Other pulmonary embolism without acute cor pulmonale (HCC)     Carotid stenosis, asymptomatic, bilateral     Lower limb amputation status     Fx humeral neck, right, closed, initial encounter     Transient hypotension     Constipation due to opioid therapy     Acute kidney injury ([address]     Diabetic ulcer of left midfoot associated with type 2 diabetes mellitus, with fat layer exposed ([address]     Complication of below knee amputation stump (HCC)     COPD exacerbation (HCC)     Hyponatremia     Pain, phantom limb ([address]     Below-knee amputation of right lower extremity (HCC)     Hyperglycemia     Moderate protein malnutrition ([address]     Essential hypertension     Uncontrolled type 2 diabetes mellitus with hyperglycemia ([address]     History of pulmonary embolism - 2017     Atelectasis     Uncontrolled type 2 diabetes mellitus with foot ulcer (HCC)     Azotemia     Anemia, normocytic normochromic     Osteomyelitis of fourth phalange of left foot (HCC)     Drug-induced constipation     Osteomyelitis (HCC)     Diabetic foot infection ([address]     Noncompliance     Type 1 diabetes mellitus with diabetic foot infection ([address]     Acute osteomyelitis of left foot (HCC)     Cellulitis of left foot     Mild malnutrition ([address]      Plan:   1. Continue IV ABX  2. Continue local wound care  3.  Marlin Pleas for discharge from a vascular standpoint    Electronically signed by Aline Shah MD on 11/6/2020 at 12:43 PM

## 2020-11-06 NOTE — PROGRESS NOTES
Physical Therapy  DATE: 2020    NAME: Khalida Quinn  MRN: [id number]   : 1955    Patient not seen this date for Physical Therapy due to:  [] Blood transfusion in progress  [] Cancel by RN  [] Hemodialysis  [x]  Refusal by Patient   [] Spine Precautions   [] Strict Bedrest  [] Surgery  [] Testing      [] Other        [] PT being discontinued at this time. Patient independent. No further needs. [] PT being discontinued at this time as the patient has been transferred to hospice care. No further needs.     Real Little, PT

## 2020-11-06 NOTE — FLOWSHEET NOTE
11/06/20 1358   Mobility   Patient Arrival To Unit 135[address]   Transport Method Bed   Back to room from PICC placement

## 2020-11-06 NOTE — PLAN OF CARE
Problem: Pain:  Goal: Pain level will decrease  Description: Pain level will decrease  11/6/2020 1041 by Cindy Tirado RN  Outcome: Ongoing  Note: Pt medicated with pain medication prn. Assessed all pain characteristics including level, type, location, frequency, and onset. Non-pharmacologic interventions offered to pt as well. Pt states pain is tolerable at this time. Will continue to monitor      Problem: Skin Integrity:  Goal: Will show no infection signs and symptoms  Description: Will show no infection signs and symptoms  Outcome: Ongoing  Note: Patient's blood sugars continue to be checked before meals and before bed. Sliding scale insulin available for blood sugars 140 or greater. Physician updated as needed. Problem: Falls - Risk of:  Goal: Will remain free from falls  Description: Will remain free from falls  11/6/2020 1041 by Cindy Tirado RN  Outcome: Ongoing  Note: Patient is a fall risk during this admission. Fall risk assessment was performed. Patient is absent of falls. Bed is in the lowest position. Wheels on the bed are locked. Call light and bed side table are within reach. Clutter is removed. Patient was educated to call out when needing assistance or wanting to get out of bed. Patient offered toileting assistance during rounding. Hourly rounds have been performed.

## 2020-11-07 LAB
BUN BLDV-MCNC: 15 MG/DL (ref 8–23)
CREAT SERPL-MCNC: 0.69 MG/DL (ref 0.7–1.2)
GFR AFRICAN AMERICAN: >60 ML/MIN
GFR NON-AFRICAN AMERICAN: >60 ML/MIN
GFR SERPL CREATININE-BSD FRML MDRD: ABNORMAL ML/MIN/{1.73_M2}
GFR SERPL CREATININE-BSD FRML MDRD: ABNORMAL ML/MIN/{1.73_M2}
GLUCOSE BLD-MCNC: 148 MG/DL (ref 75–110)
GLUCOSE BLD-MCNC: 150 MG/DL (ref 75–110)
GLUCOSE BLD-MCNC: 164 MG/DL (ref 75–110)
GLUCOSE BLD-MCNC: 182 MG/DL (ref 75–110)
GLUCOSE BLD-MCNC: 54 MG/DL (ref 75–110)
GLUCOSE BLD-MCNC: 62 MG/DL (ref 75–110)
GLUCOSE BLD-MCNC: 68 MG/DL (ref 75–110)

## 2020-11-07 PROCEDURE — 99232 SBSQ HOSP IP/OBS MODERATE 35: CPT | Performed by: FAMILY MEDICINE

## 2020-11-07 PROCEDURE — 97110 THERAPEUTIC EXERCISES: CPT

## 2020-11-07 PROCEDURE — 6370000000 HC RX 637 (ALT 250 FOR IP): Performed by: NURSE PRACTITIONER

## 2020-11-07 PROCEDURE — 82947 ASSAY GLUCOSE BLOOD QUANT: CPT

## 2020-11-07 PROCEDURE — 97166 OT EVAL MOD COMPLEX 45 MIN: CPT

## 2020-11-07 PROCEDURE — 82565 ASSAY OF CREATININE: CPT

## 2020-11-07 PROCEDURE — 97535 SELF CARE MNGMENT TRAINING: CPT

## 2020-11-07 PROCEDURE — 84520 ASSAY OF UREA NITROGEN: CPT

## 2020-11-07 PROCEDURE — 1200000000 HC SEMI PRIVATE

## 2020-11-07 PROCEDURE — 6360000002 HC RX W HCPCS: Performed by: NURSE PRACTITIONER

## 2020-11-07 PROCEDURE — 2580000003 HC RX 258: Performed by: NURSE PRACTITIONER

## 2020-11-07 PROCEDURE — 36415 COLL VENOUS BLD VENIPUNCTURE: CPT

## 2020-11-07 RX ADMIN — INSULIN LISPRO 1 UNITS: 100 INJECTION, SOLUTION INTRAVENOUS; SUBCUTANEOUS at 21:30

## 2020-11-07 RX ADMIN — APIXABAN 5 MG: 5 TABLET, FILM COATED ORAL at 08:52

## 2020-11-07 RX ADMIN — FAMOTIDINE 20 MG: 20 TABLET, FILM COATED ORAL at 21:30

## 2020-11-07 RX ADMIN — DILTIAZEM HYDROCHLORIDE 60 MG: 60 TABLET, FILM COATED ORAL at 06:37

## 2020-11-07 RX ADMIN — INSULIN GLARGINE 70 UNITS: 100 INJECTION, SOLUTION SUBCUTANEOUS at 21:30

## 2020-11-07 RX ADMIN — INSULIN LISPRO 2 UNITS: 100 INJECTION, SOLUTION INTRAVENOUS; SUBCUTANEOUS at 08:53

## 2020-11-07 RX ADMIN — NORTRIPTYLINE HYDROCHLORIDE 50 MG: 25 CAPSULE ORAL at 21:30

## 2020-11-07 RX ADMIN — SODIUM CHLORIDE, PRESERVATIVE FREE 10 ML: 5 INJECTION INTRAVENOUS at 21:40

## 2020-11-07 RX ADMIN — METFORMIN HYDROCHLORIDE 500 MG: 500 TABLET ORAL at 08:52

## 2020-11-07 RX ADMIN — INSULIN LISPRO 2 UNITS: 100 INJECTION, SOLUTION INTRAVENOUS; SUBCUTANEOUS at 11:52

## 2020-11-07 RX ADMIN — SODIUM CHLORIDE 500 MG: 9 INJECTION, SOLUTION INTRAVENOUS at 14:23

## 2020-11-07 RX ADMIN — DILTIAZEM HYDROCHLORIDE 60 MG: 60 TABLET, FILM COATED ORAL at 17:00

## 2020-11-07 RX ADMIN — OXYCODONE HYDROCHLORIDE AND ACETAMINOPHEN 2 TABLET: 5; 325 TABLET ORAL at 18:49

## 2020-11-07 RX ADMIN — CEFEPIME 2 G: 2 INJECTION, POWDER, FOR SOLUTION INTRAVENOUS at 11:53

## 2020-11-07 RX ADMIN — METFORMIN HYDROCHLORIDE 500 MG: 500 TABLET ORAL at 17:00

## 2020-11-07 RX ADMIN — APIXABAN 5 MG: 5 TABLET, FILM COATED ORAL at 21:30

## 2020-11-07 RX ADMIN — FAMOTIDINE 20 MG: 20 TABLET, FILM COATED ORAL at 08:52

## 2020-11-07 RX ADMIN — ANTACID TABLETS 1000 MG: 500 TABLET, CHEWABLE ORAL at 18:48

## 2020-11-07 RX ADMIN — Medication 2 MG: at 21:31

## 2020-11-07 RX ADMIN — SODIUM CHLORIDE: 9 INJECTION, SOLUTION INTRAVENOUS at 11:47

## 2020-11-07 RX ADMIN — ATORVASTATIN CALCIUM 40 MG: 40 TABLET, FILM COATED ORAL at 21:30

## 2020-11-07 RX ADMIN — OXYCODONE HYDROCHLORIDE AND ACETAMINOPHEN 2 TABLET: 5; 325 TABLET ORAL at 13:21

## 2020-11-07 RX ADMIN — OXYCODONE HYDROCHLORIDE AND ACETAMINOPHEN 2 TABLET: 5; 325 TABLET ORAL at 08:57

## 2020-11-07 RX ADMIN — INSULIN LISPRO 2 UNITS: 100 INJECTION, SOLUTION INTRAVENOUS; SUBCUTANEOUS at 17:00

## 2020-11-07 RX ADMIN — DILTIAZEM HYDROCHLORIDE 60 MG: 60 TABLET, FILM COATED ORAL at 11:53

## 2020-11-07 RX ADMIN — SODIUM CHLORIDE, PRESERVATIVE FREE 10 ML: 5 INJECTION INTRAVENOUS at 08:53

## 2020-11-07 ASSESSMENT — PAIN DESCRIPTION - PAIN TYPE
TYPE: ACUTE PAIN;SURGICAL PAIN
TYPE: ACUTE PAIN;CHRONIC PAIN

## 2020-11-07 ASSESSMENT — PAIN DESCRIPTION - ORIENTATION
ORIENTATION: RIGHT
ORIENTATION: LEFT

## 2020-11-07 ASSESSMENT — PAIN SCALES - GENERAL
PAINLEVEL_OUTOF10: 7
PAINLEVEL_OUTOF10: 8
PAINLEVEL_OUTOF10: 7
PAINLEVEL_OUTOF10: 4
PAINLEVEL_OUTOF10: 7
PAINLEVEL_OUTOF10: 6
PAINLEVEL_OUTOF10: 4

## 2020-11-07 ASSESSMENT — PAIN DESCRIPTION - FREQUENCY: FREQUENCY: CONTINUOUS

## 2020-11-07 ASSESSMENT — PAIN DESCRIPTION - LOCATION
LOCATION: ABDOMEN;FOOT
LOCATION: FOOT;LEG

## 2020-11-07 ASSESSMENT — PAIN DESCRIPTION - DESCRIPTORS: DESCRIPTORS: ACHING;CONSTANT

## 2020-11-07 ASSESSMENT — PAIN DESCRIPTION - ONSET: ONSET: ON-GOING

## 2020-11-07 NOTE — PROGRESS NOTES
VASCULAR SURGERY  PROGRESS NOTE      11/7/2020 3:30 PM  Subjective:   Admit Date: 11/3/2020  PCP: Alysha Cornell DO    Chief Complaint   Patient presents with    Foot Pain     Interval History: No complaints. Awaiting F approval.    Diet: DIET CARB CONTROL; Dietary Nutrition Supplements: Diabetic Oral Supplement    Medications:   Scheduled Meds:   cefepime  2 g Intravenous Q12H    daptomycin (CUBICIN) IVPB  500 mg Intravenous Q24H    apixaban  5 mg Oral BID    atorvastatin  40 mg Oral Nightly    dilTIAZem  60 mg Oral 4 times per day    famotidine  20 mg Oral BID    insulin glargine  70 Units Subcutaneous Nightly    insulin lispro  0-12 Units Subcutaneous TID WC    insulin lispro  0-6 Units Subcutaneous Nightly    metFORMIN  500 mg Oral BID WC    nortriptyline  50 mg Oral Nightly    sodium chloride flush  10 mL Intravenous 2 times per day     Continuous Infusions:   sodium chloride 75 mL/hr at 11/07/20 1147    dextrose           Labs:   CBC:   No results for input(s): WBC, HGB, PLT in the last 72 hours. BMP:    Recent Labs     11/05/20  0459 11/07/20  0517   BUN 17 15   CREATININE 0.88 0.69*     Hepatic: No results for input(s): AST, ALT, ALB, BILITOT, ALKPHOS in the last 72 hours. Troponin: Invalid input(s): TROPONIN  BNP: No results for input(s): BNP in the last 72 hours. Lipids: No results for input(s): CHOL, HDL in the last 72 hours. Invalid input(s): LDLCALCU  INR: No results for input(s): INR in the last 72 hours.     Objective:   Vitals: /65   Pulse 93   Temp 97.5 °F (36.4 °C) (Oral)   Resp 16   Ht 6' 0.01\" (1.829 m)   Wt 157 lb (71.2 kg)   SpO2 95%   BMI 21.29 kg/m²   General appearance: alert, cooperative and no distress  Mental Status: oriented to person, place and time with normal affect  Neck: good carotid pulses, no JVD  Lungs: clear to auscultation bilaterally, normal effort  Heart: regular rate and rhythm, no murmur,  Abdomen: soft, non-tender, non-distended, bowel sounds present all four quadrants, no masses, hepatomegaly, splenomegaly or aortic enlargement  Extremities: peripheral pulses by Doppler on left, left TMA site mostly healed with small open area with packing. Right BKA site healed  Skin: no gross lesions, rashes, or induration    Assessment:   1. 64 y/o noncompliant diabetic with left TMA site infection/osteo  2.  Adequate lower extremity arterial flow    Patient Active Problem List:     Type 2 diabetes mellitus with diabetic polyneuropathy, with long-term current use of insulin (HCC)     Neuropathic pain, leg     Diabetic polyneuropathy (HCC)     Allergic rhinitis     Tobacco dependence     Edentulous     Dysphagia     Lung nodules     Esophageal cancer (HCC)     Fracture of humerus, left, closed     Closed fracture of humerus     DM type 2 with diabetic peripheral neuropathy (HCC)     COPD without exacerbation (HCC)     Dyslipidemia     Gastroesophageal reflux disease     Chronic pain syndrome     Marijuana use     History of colon polyps     Cellulitis of right heel     Functional diarrhea     Sepsis due to methicillin resistant Staphylococcus aureus (MRSA) (HCC)     History of esophageal cancer     Osteomyelitis, chronic, ankle or foot ([address]     Peripheral artery disease ([address]     Other pulmonary embolism without acute cor pulmonale (HCC)     Carotid stenosis, asymptomatic, bilateral     Lower limb amputation status     Fx humeral neck, right, closed, initial encounter     Transient hypotension     Constipation due to opioid therapy     Acute kidney injury ([address]     Diabetic ulcer of left midfoot associated with type 2 diabetes mellitus, with fat layer exposed ([address]     Complication of below knee amputation stump (HCC)     COPD exacerbation (HCC)     Hyponatremia     Pain, phantom limb ([address]     Below-knee amputation of right lower extremity (HCC)     Hyperglycemia     Moderate protein malnutrition ([address]     Essential hypertension     Uncontrolled type 2
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
## 2020-11-07 NOTE — DISCHARGE SUMMARY
[facility]  Office: [address], , Taras Schmitz DO, Clair Hanson, DO, Angela Eloina Blood, DO, Kavon Martin MD, Aden Wyman MD, Darryle So, MD, Charlene Her MD, Dheeraj Liang MD, Marcelino Wilder MD, Rasta Mansfield MD, Brigido Santiago MD, Farzaneh Monk MD, Tutu Shannon, DO, Michael Bragg MD, Colleen Raines MD, Corbin Penny DO, Concha Purvis MD,  Yaritza Chapman DO, Chari Miner MD, Chirag Rinaldi MD, Klaudia Feliciano, [facility], [facility], CNP, Stone Aranda, CNP, Issa Quan, CNS, Alberto Hernandez, CNP, Ever Dunaway, CNP, Yves Malin, CNP, Ishaan Domínguez, CNP, Geno Bueno, CNP, Yamini Sherman PA-C, Becki Kapoor, ANIYAH, Jony Griffiths, CNP, Griselda Head, CNP, Isac Jones, CNP, Tawana Rubinstein, CNP, Ana Rosa Murray, CNP         [facility]    Discharge Summary     Patient ID: Khalida Quinn  :  1955   MRN: [id number]     ACCOUNT:  [de-identified]   Patient's PCP: Mohinder Dang DO  Admit Date: 11/3/2020   Discharge Date: 2020      Length of Stay: 4  Code Status:  Full Code  Admitting Physician: Chari Miner MD  Discharge Physician: Chari Miner MD     Active Discharge Diagnoses:     Hospital Problem Lists:  Principal Problem:    Acute osteomyelitis of left foot [facility])  Active Problems:    Chronic obstructive pulmonary disease (HCC)    Dyslipidemia    Gastroesophageal reflux disease    Chronic pain syndrome    Essential hypertension    Diabetic foot infection ([facility]    Type 1 diabetes mellitus with diabetic foot infection ([facility]    Cellulitis of left foot    Mild malnutrition ([facility]  Resolved Problems:    COPD (chronic obstructive pulmonary disease) ([facility]    PVD (peripheral vascular disease) ([facility]    Hyperglycemia      Admission Condition:  stable     Discharged Condition: stable    Hospital Stay:     Hospital Course:  Khalida Quinn is a 63 y.o. male who was admitted for the management of    Acute osteomyelitis of left foot ([address] , presented to ER with   Foot Pain      1. Left foot osteomyelitis   2. Jones Grade 3 ulcer left foot   - s/p LTMA/RBKA  - Continue IV antibiotics per ID recommendations of IV daptomycin and cefepime to complete a 6 week course   -Eber Molina CM working on SNF placement   - Pt will need weekly labs including CBC, CMP   - Follow up with ID clinic in 3-4 weeks  - HWB to LLE   - Cleared for d/c from podiatry and ID standpoint   - Follow up with [facility] podiatry clinic within one week of dc   - Continue monitoring of white count and renal function     3. HTN   - cardizem on board      4. HLD   - statin      5. DMII  - continue home lantus with SSI   - episode of hypoglycemia this morning which resolved with juice      6.  Hx of PE   - eliquis for anti coagulation     General appearance:  alert, cooperative and no distress  Mental Status:  oriented to person, place and time and normal affect  Lungs:  clear to auscultation bilaterally, normal effort  Heart:  regular rate and rhythm, no murmur  Abdomen:  soft, nontender, nondistended, normal bowel sounds, no masses, hepatomegaly, splenomegaly  Dermatologic: wound dressing in place over left foot    Significant therapeutic interventions:      Significant Diagnostic Studies:   Labs / Micro:  CBC:   Lab Results   Component Value Date    WBC 9.5 11/08/2020    RBC 3.92 11/08/2020    RBC 4.32 05/02/2012    HGB 10.4 11/08/2020    HCT 35.4 11/08/2020    MCV 90.3 11/08/2020    MCH 26.5 11/08/2020    MCHC 29.4 11/08/2020    RDW 14.5 11/08/2020     11/08/2020     05/02/2012     BMP:    Lab Results   Component Value Date    GLUCOSE 202 11/08/2020    GLUCOSE 129 05/02/2012     11/08/2020    K 4.0 11/08/2020     11/08/2020    CO2 27 11/08/2020    ANIONGAP 7 11/08/2020    BUN 21 11/09/2020    CREATININE 0.80 11/09/2020    BUNCRER 18 11/08/2020    CALCIUM 9.0 11/08/2020    LABGLOM >60 11/09/2020    GFRAA >60 11/09/2020 GFR      11/09/2020    GFR NOT REPORTED 11/09/2020     HFP:    Lab Results   Component Value Date    PROT 6.7 03/12/2020     CMP:    Lab Results   Component Value Date    GLUCOSE 202 11/08/2020    GLUCOSE 129 05/02/2012     11/08/2020    K 4.0 11/08/2020     11/08/2020    CO2 27 11/08/2020    BUN 21 11/09/2020    CREATININE 0.80 11/09/2020    ANIONGAP 7 11/08/2020    ALKPHOS 127 03/12/2020    ALT 14 03/12/2020    AST 12 03/12/2020    BILITOT <0.10 03/12/2020    LABALBU 3.4 03/12/2020    LABALBU 4.4 03/05/2012    ALBUMIN NOT REPORTED 03/12/2020    LABGLOM >60 11/09/2020    GFRAA >60 11/09/2020    GFR      11/09/2020    GFR NOT REPORTED 11/09/2020    PROT 6.7 03/12/2020    CALCIUM 9.0 11/08/2020     PT/INR:    Lab Results   Component Value Date    PROTIME 13.1 07/29/2020    PROTIME 10.5 05/02/2012    INR 1.0 07/29/2020     PTT:   Lab Results   Component Value Date    APTT 27.3 06/23/2018     FLP:    Lab Results   Component Value Date    CHOL 174 06/24/2018    TRIG 175 06/24/2018    HDL 49 06/24/2018     U/A:    Lab Results   Component Value Date    COLORU YELLOW 07/09/2018    TURBIDITY CLEAR 07/09/2018    SPECGRAV 1.010 07/09/2018    HGBUR NEGATIVE 07/09/2018    PHUR 5.0 07/09/2018    PROTEINU 1+ 07/09/2018    GLUCOSEU NEGATIVE 07/09/2018    GLUCOSEU TRACE 03/05/2012    KETUA NEGATIVE 07/09/2018    BILIRUBINUR NEGATIVE 07/09/2018    BILIRUBINUR small 07/18/2016    BILIRUBINUR NEGATIVE 03/05/2012    UROBILINOGEN Normal 07/09/2018    NITRU NEGATIVE 07/09/2018    LEUKOCYTESUR NEGATIVE 07/09/2018     TSH:    Lab Results   Component Value Date    TSH 0.93 06/24/2018         Radiology:  Xr Foot Left (min 3 Views)    Result Date: 11/3/2020  Status post transmetatarsal amputation. Periosteal new bone formation is noted about the 1st, 2nd and 3rd metatarsal and the amputation margin of the 1st metatarsal now appears irregular.   The possibility of underlying osteomyelitis should be considered and further evaluated with MRI. Ct Foot Left Wo Contrast    Result Date: 11/3/2020  1. Soft tissue defect and soft tissue gas in a sinus tract extending to and exposing the distal resection margin of the residual 1st metatarsal consistent with osteomyelitis. Possible early osseous destruction at the resection margin of residual of the distal 1st metatarsal.  Further evaluation with MRI without and with contrast can be performed to evaluate the extent of the probable osteomyelitis assuming there are no contraindications to MRI or contrast. 2. Suspected low-grade interstitial tearing and tendinosis of the distal Achilles tendon. 3. Suspected partial split tearing of the posterior peroneus brevis tendon. 4. Mild edema in the subcutaneous fat about the foot. 5. Severe thinning of the soft tissues along the distal 5th metatarsal resection margin. 6. Degenerative changes as detailed above. Diffuse osteopenia. 7. Evidence of prior transmetatarsal amputation. Ir Insert Picc Vad W Sq Port >5 Years    Result Date: 11/6/2020  Successful ultrasound and fluoroscopy guided PICC placement       Consultations:    Consults:     Final Specialist Recommendations/Findings:   IP CONSULT TO HOSPITALIST  IP CONSULT TO PHARMACY  IP CONSULT TO INFECTIOUS DISEASES  IP CONSULT TO VASCULAR SURGERY      The patient was seen and examined on day of discharge and this discharge summary is in conjunction with any daily progress note from day of discharge.     Discharge plan:     Disposition: SNF    Physician Follow Up:      [facility]  [address]  [address] [facility] [address] [facility] [address] [phone]  Schedule an appointment as soon as possible for a visit in 1 week  follow up    [address]  [place] [address]  [phone]    Schedule an appointment as soon as possible for a visit in 1 week      [address] [place] [address]  [phone]    Schedule an appointment as as: [id number] [facility] 1 patch onto the skin daily as needed (if pateint smokes)     nortriptyline 25 MG capsule  Commonly known as:  PAMELOR     oxyCODONE-acetaminophen 5-325 MG per tablet  Commonly known as:  PERCOCET     Tresiba FlexTouch 100 UNIT/ML Sopn  Generic drug:  Insulin Degludec     * TRUEplus Lancets 30G Misc  Inject 1 each into the skin 3 times daily TO CHECK FLUCTUATING BLOOD SUGARS IE HYPERGLYCEMIA     * Lancets Misc  1 each by Does not apply route 2 times daily     Ventolin  (90 Base) MCG/ACT inhaler  Generic drug:  albuterol sulfate HFA         * This list has 2 medication(s) that are the same as other medications prescribed for you. Read the directions carefully, and ask your doctor or other care provider to review them with you. STOP taking these medications    Xarelto 10 MG Tabs tablet  Generic drug:  rivaroxaban           Where to Get Your Medications      You can get these medications from any pharmacy    Bring a paper prescription for each of these medications  · cefepime  infusion  · daptomycin  infusion         No discharge procedures on file. Time Spent on discharge is  37 mins in patient examination, evaluation, counseling as well as medication reconciliation, prescriptions for required medications, discharge plan and follow up. Electronically signed by   Charito Johnson MD  11/9/2020  9:39 AM      Thank you Dr. Prem Vega DO for the opportunity to be involved in this patient's care.

## 2020-11-07 NOTE — PROGRESS NOTES
Progress Note  Podiatric Medicine and Surgery     Subjective     CC: Left foot infection    Patient seen and examined at bedside  Afebrile, vital signs stable  Pain controlled to left foot  Denies n/v/f/c/cp/sob/loss of appetite      HPI :  Nelly Cardenas is a 63 y. o. male seen at [address] [place] [address]. [facility] for a wound on the left foot. The patient states the wound has been getting progressively worse with increasing drainage, edema, erythema and pain.  The patient has daily home health care changing his dressings, the nurse advised him to go to the ED for increased drainage to the left foot wound. The patient is well known to Dr. Walters, and was last seen in their office on 10/7/2020.  The patient was advised to follow-up within 1 week however has not followed up since that time.  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 Their last HgbA1c was 13.0% as of July 2020. The patient states they have been feeling nauseated with chills. The patient denies any fever, vomiting, or SOB. The patient denies any other pedal complaints.  Of note, the patient has a right below-knee amputation and known peripheral arterial disease.  The patient underwent revascularization of the left lower extremity on 7/29/2020 with Dr. Anahy Tavarez. ROS: Denies N/V/F/C/SOB/CP. Otherwise negative except at stated in the HPI.      Medications:  Scheduled Meds:   cefepime  2 g Intravenous Q12H    daptomycin (CUBICIN) IVPB  500 mg Intravenous Q24H    apixaban  5 mg Oral BID    atorvastatin  40 mg Oral Nightly    dilTIAZem  60 mg Oral 4 times per day    famotidine  20 mg Oral BID    insulin glargine  70 Units Subcutaneous Nightly    insulin lispro  0-12 Units Subcutaneous TID WC    insulin lispro  0-6 Units Subcutaneous Nightly    metFORMIN  500 mg Oral BID WC    nortriptyline  50 mg Oral Nightly    sodium chloride flush  10 mL Intravenous 2 times per day       Continuous Infusions:   sodium chloride 75 mL/hr at 11/06/20 2120  dextrose         PRN Meds:calcium carbonate, albuterol sulfate HFA, docusate sodium, sodium chloride flush, potassium chloride **OR** potassium alternative oral replacement **OR** potassium chloride, magnesium sulfate, acetaminophen **OR** acetaminophen, polyethylene glycol, promethazine **OR** ondansetron, nicotine, oxyCODONE-acetaminophen, morphine **OR** morphine, glucose, dextrose, glucagon (rDNA), dextrose    Objective     Vitals:  Patient Vitals for the past 8 hrs:   BP Temp Temp src Pulse Resp SpO2 Weight   20 0719 130/65 97.5 °F (36.4 °C) Oral 93 16 95 % --   20 0324 -- -- -- -- -- -- 157 lb (71.2 kg)     Average, Min, and Max for last 24 hours Vitals:  TEMPERATURE:  Temp  Av.7 °F (36.5 °C)  Min: 97.3 °F (36.3 °C)  Max: 98.4 °F (36.9 °C)    RESPIRATIONS RANGE: Resp  Av  Min: 16  Max: 16    PULSE RANGE: Pulse  Av.3  Min: 93  Max: 94    BLOOD PRESSURE RANGE:  Systolic (38RUV), OXP:007 , Min:130 , IXX:670   ; Diastolic (64XTB), UJV:18, Min:54, Max:76      PULSE OXIMETRY RANGE: SpO2  Av %  Min: 94 %  Max: 96 %    I/O last 3 completed shifts:  In: -   Out: 2450 [Urine:2450]    CBC:  No results for input(s): WBC, HGB, HCT, PLT, CRP in the last 72 hours. Invalid input(s):  ESR     BMP:  Recent Labs     20  0459 20  0517   BUN 17 15   CREATININE 0.88 0.69*        Coags:  No results for input(s): APTT, PROT, INR in the last 72 hours. Lab Results   Component Value Date    SEDRATE 53 (H) 2020     No results for input(s): CRP in the last 72 hours. Lower Extremity Physical Exam:  Vascular: DP and PT pulses are Palpable . CFT <3 seconds to all digits.  Hair growth is absent to the level of the digits.  Mild non-pitting edema to the left lower extremity.       Neuro: Saph/sural/SP/DP/plantar sensation diminished to light touch.     Musculoskeletal: Muscle strength is 5/5 to all lower extremity muscle groups.  Gross deformity is s/p R BKA, L TMA.     Dermatologic: Full thickness ulcer #1 located L distal medial TMA stump and measures approximately 1.0cm x 1.0cm x 0.5cm. The wound base is fibro-necrotic. Periwound skin is erythematous.  Mild serous drainage noted with no associated mal odor. Continued improvement of erythema noted, mild associated increase in warmth. Wound probes directly to remnant first metatarsal.  Does not sinus track, or undermine. No fluctuance, crepitus, or induration. Interdigital maceration absent. Full thickness ulcer #2 located to L distal lateral TMA stump, measuring approximately 0.5 cm x 0.5 cm x 0.3 cm. Wound base mixture of granular and necrotic tissue. Mild serous drainage noted, no associated malodor. Wound probes deep but not to bone. Clinical Images:                        Imaging:   IR INSERT PICC VAD W SQ PORT >5 YEARS   Final Result   Successful ultrasound and fluoroscopy guided PICC placement         CT FOOT LEFT WO CONTRAST   Final Result   1. Soft tissue defect and soft tissue gas in a sinus tract extending to and   exposing the distal resection margin of the residual 1st metatarsal   consistent with osteomyelitis. Possible early osseous destruction at the   resection margin of residual of the distal 1st metatarsal.  Further   evaluation with MRI without and with contrast can be performed to evaluate   the extent of the probable osteomyelitis assuming there are no   contraindications to MRI or contrast.   2. Suspected low-grade interstitial tearing and tendinosis of the distal   Achilles tendon. 3. Suspected partial split tearing of the posterior peroneus brevis tendon. 4. Mild edema in the subcutaneous fat about the foot. 5. Severe thinning of the soft tissues along the distal 5th metatarsal   resection margin. 6. Degenerative changes as detailed above. Diffuse osteopenia. 7. Evidence of prior transmetatarsal amputation.          XR FOOT LEFT (MIN 3 VIEWS)   Final Result   Status post transmetatarsal amputation. Periosteal new bone formation is   noted about the 1st, 2nd and 3rd metatarsal and the amputation margin of the   1st metatarsal now appears irregular. The possibility of underlying   osteomyelitis should be considered and further evaluated with MRI. Cultures:   11/3 Wound Culture: NGTD    Assessment   Antonella Brown is a 63 y.o. male with   1. Cornelious Trisha grade 3 ulcer, left foot  2. Osteomyelitis, left foot, specifically 1st metatarsal remnant  3. S/p L TMA, R BKA  4. Cellulitis, LLE  5. Type II DM with secondary peripheral neuropathy  6. PAD s/p LLE re-vascularization (DOS: 7/29/2020)    Principal Problem:    Acute osteomyelitis of left foot (HCC)  Active Problems:    Chronic obstructive pulmonary disease (HCC)    Dyslipidemia    Gastroesophageal reflux disease    Chronic pain syndrome    Essential hypertension    Diabetic foot infection (HCC)    Type 1 diabetes mellitus with diabetic foot infection ([facility]    Cellulitis of left foot    Mild malnutrition ([facility])  Resolved Problems:    COPD (chronic obstructive pulmonary disease) ([facility])    PVD (peripheral vascular disease) ([facility] [facility]    Hyperglycemia       Plan     · Patient examined and evaluated at bedside. · Treatment options discussed in detail with the patient. · Radiographs reviewed and discussed in detail with the patient. ? No apparent soft tissue emphysema or osteomyelitis appreciated. · CT of the LLE to rule out soft tissue emphysema- focal soft tissue emphysema likely due to wound being exposed to air, not gas gangrene. Reading radiologist agrees. · NIVS from 7/31/2020 -- HILARY 1.17  · ID following- continue 6 week IV course of cefepime and daptomycin. Abx course will be complete on December 15, 2020  · Vascular- continue local wound care and IV abx; okay for DC  · Dressing applied to Left foot: 1/4\" iodoform packing, DSD  · HWB to Left lower extremity.   · Patient okay for discharge from podiatry standpoint pending

## 2020-11-07 NOTE — PLAN OF CARE
Problem: Pain:  Goal: Pain level will decrease  Description: Pain level will decrease  11/7/2020 0009 by Judi Cunningham RN  Outcome: Ongoing   Pt receiving Percocet for pain control pain med is effective.    Problem: Skin Integrity:  Goal: Will show no infection signs and symptoms  Description: Will show no infection signs and symptoms  11/7/2020 1324 by Loren Aquino RN  Outcome: Ongoing  11/7/2020 0009 by Judi Cunningham RN  Outcome: Ongoing   Iv atb, Podiatry eval, dressing changes to left foot monitoring vitals
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
## 2020-11-08 LAB
ABSOLUTE EOS #: 0.41 K/UL (ref 0–0.44)
ABSOLUTE IMMATURE GRANULOCYTE: 0.05 K/UL (ref 0–0.3)
ABSOLUTE LYMPH #: 1.99 K/UL (ref 1.1–3.7)
ABSOLUTE MONO #: 1.08 K/UL (ref 0.1–1.2)
ANION GAP SERPL CALCULATED.3IONS-SCNC: 7 MMOL/L (ref 9–17)
BASOPHILS # BLD: 1 % (ref 0–2)
BASOPHILS ABSOLUTE: 0.07 K/UL (ref 0–0.2)
BUN BLDV-MCNC: 15 MG/DL (ref 8–23)
BUN/CREAT BLD: 18 (ref 9–20)
CALCIUM SERPL-MCNC: 9 MG/DL (ref 8.6–10.4)
CHLORIDE BLD-SCNC: 106 MMOL/L (ref 98–107)
CO2: 27 MMOL/L (ref 20–31)
CREAT SERPL-MCNC: 0.83 MG/DL (ref 0.7–1.2)
CULTURE: ABNORMAL
CULTURE: ABNORMAL
DIFFERENTIAL TYPE: ABNORMAL
DIRECT EXAM: ABNORMAL
DIRECT EXAM: ABNORMAL
EOSINOPHILS RELATIVE PERCENT: 4 % (ref 1–4)
GFR AFRICAN AMERICAN: >60 ML/MIN
GFR NON-AFRICAN AMERICAN: >60 ML/MIN
GFR SERPL CREATININE-BSD FRML MDRD: ABNORMAL ML/MIN/{1.73_M2}
GFR SERPL CREATININE-BSD FRML MDRD: ABNORMAL ML/MIN/{1.73_M2}
GLUCOSE BLD-MCNC: 132 MG/DL (ref 75–110)
GLUCOSE BLD-MCNC: 137 MG/DL (ref 75–110)
GLUCOSE BLD-MCNC: 153 MG/DL (ref 75–110)
GLUCOSE BLD-MCNC: 160 MG/DL (ref 75–110)
GLUCOSE BLD-MCNC: 176 MG/DL (ref 75–110)
GLUCOSE BLD-MCNC: 202 MG/DL (ref 70–99)
HCT VFR BLD CALC: 35.4 % (ref 40.7–50.3)
HEMOGLOBIN: 10.4 G/DL (ref 13–17)
IMMATURE GRANULOCYTES: 1 %
LYMPHOCYTES # BLD: 21 % (ref 24–43)
Lab: ABNORMAL
MAGNESIUM: 1.7 MG/DL (ref 1.6–2.6)
MCH RBC QN AUTO: 26.5 PG (ref 25.2–33.5)
MCHC RBC AUTO-ENTMCNC: 29.4 G/DL (ref 28.4–34.8)
MCV RBC AUTO: 90.3 FL (ref 82.6–102.9)
MONOCYTES # BLD: 11 % (ref 3–12)
NRBC AUTOMATED: 0 PER 100 WBC
PDW BLD-RTO: 14.5 % (ref 11.8–14.4)
PLATELET # BLD: 333 K/UL (ref 138–453)
PLATELET ESTIMATE: ABNORMAL
PMV BLD AUTO: 9.7 FL (ref 8.1–13.5)
POTASSIUM SERPL-SCNC: 4 MMOL/L (ref 3.7–5.3)
RBC # BLD: 3.92 M/UL (ref 4.21–5.77)
RBC # BLD: ABNORMAL 10*6/UL
SEG NEUTROPHILS: 62 % (ref 36–65)
SEGMENTED NEUTROPHILS ABSOLUTE COUNT: 5.94 K/UL (ref 1.5–8.1)
SODIUM BLD-SCNC: 140 MMOL/L (ref 135–144)
SPECIMEN DESCRIPTION: ABNORMAL
WBC # BLD: 9.5 K/UL (ref 3.5–11.3)
WBC # BLD: ABNORMAL 10*3/UL

## 2020-11-08 PROCEDURE — 85025 COMPLETE CBC W/AUTO DIFF WBC: CPT

## 2020-11-08 PROCEDURE — 1200000000 HC SEMI PRIVATE

## 2020-11-08 PROCEDURE — 80048 BASIC METABOLIC PNL TOTAL CA: CPT

## 2020-11-08 PROCEDURE — 6370000000 HC RX 637 (ALT 250 FOR IP): Performed by: NURSE PRACTITIONER

## 2020-11-08 PROCEDURE — 6360000002 HC RX W HCPCS: Performed by: NURSE PRACTITIONER

## 2020-11-08 PROCEDURE — 36415 COLL VENOUS BLD VENIPUNCTURE: CPT

## 2020-11-08 PROCEDURE — 82947 ASSAY GLUCOSE BLOOD QUANT: CPT

## 2020-11-08 PROCEDURE — 83735 ASSAY OF MAGNESIUM: CPT

## 2020-11-08 PROCEDURE — 2580000003 HC RX 258: Performed by: NURSE PRACTITIONER

## 2020-11-08 PROCEDURE — 99231 SBSQ HOSP IP/OBS SF/LOW 25: CPT | Performed by: FAMILY MEDICINE

## 2020-11-08 RX ORDER — LORAZEPAM 1 MG/1
1 TABLET ORAL EVERY 6 HOURS PRN
Status: DISCONTINUED | OUTPATIENT
Start: 2020-11-08 | End: 2020-11-12 | Stop reason: HOSPADM

## 2020-11-08 RX ORDER — METOPROLOL TARTRATE 5 MG/5ML
5 INJECTION INTRAVENOUS EVERY 4 HOURS PRN
Status: DISCONTINUED | OUTPATIENT
Start: 2020-11-08 | End: 2020-11-12 | Stop reason: HOSPADM

## 2020-11-08 RX ADMIN — APIXABAN 5 MG: 5 TABLET, FILM COATED ORAL at 21:37

## 2020-11-08 RX ADMIN — OXYCODONE HYDROCHLORIDE AND ACETAMINOPHEN 2 TABLET: 5; 325 TABLET ORAL at 01:13

## 2020-11-08 RX ADMIN — DILTIAZEM HYDROCHLORIDE 60 MG: 60 TABLET, FILM COATED ORAL at 23:48

## 2020-11-08 RX ADMIN — NORTRIPTYLINE HYDROCHLORIDE 50 MG: 25 CAPSULE ORAL at 21:38

## 2020-11-08 RX ADMIN — FAMOTIDINE 20 MG: 20 TABLET, FILM COATED ORAL at 21:37

## 2020-11-08 RX ADMIN — DILTIAZEM HYDROCHLORIDE 60 MG: 60 TABLET, FILM COATED ORAL at 11:56

## 2020-11-08 RX ADMIN — SODIUM CHLORIDE 500 MG: 9 INJECTION, SOLUTION INTRAVENOUS at 13:50

## 2020-11-08 RX ADMIN — APIXABAN 5 MG: 5 TABLET, FILM COATED ORAL at 08:35

## 2020-11-08 RX ADMIN — SODIUM CHLORIDE, PRESERVATIVE FREE 10 ML: 5 INJECTION INTRAVENOUS at 22:19

## 2020-11-08 RX ADMIN — CEFEPIME 2 G: 2 INJECTION, POWDER, FOR SOLUTION INTRAVENOUS at 01:12

## 2020-11-08 RX ADMIN — DILTIAZEM HYDROCHLORIDE 60 MG: 60 TABLET, FILM COATED ORAL at 01:13

## 2020-11-08 RX ADMIN — METFORMIN HYDROCHLORIDE 500 MG: 500 TABLET ORAL at 17:04

## 2020-11-08 RX ADMIN — METFORMIN HYDROCHLORIDE 500 MG: 500 TABLET ORAL at 08:35

## 2020-11-08 RX ADMIN — OXYCODONE HYDROCHLORIDE AND ACETAMINOPHEN 2 TABLET: 5; 325 TABLET ORAL at 18:00

## 2020-11-08 RX ADMIN — SODIUM CHLORIDE, PRESERVATIVE FREE 10 ML: 5 INJECTION INTRAVENOUS at 08:50

## 2020-11-08 RX ADMIN — OXYCODONE HYDROCHLORIDE AND ACETAMINOPHEN 2 TABLET: 5; 325 TABLET ORAL at 08:35

## 2020-11-08 RX ADMIN — INSULIN LISPRO 1 UNITS: 100 INJECTION, SOLUTION INTRAVENOUS; SUBCUTANEOUS at 21:38

## 2020-11-08 RX ADMIN — DILTIAZEM HYDROCHLORIDE 60 MG: 60 TABLET, FILM COATED ORAL at 17:04

## 2020-11-08 RX ADMIN — FAMOTIDINE 20 MG: 20 TABLET, FILM COATED ORAL at 08:35

## 2020-11-08 RX ADMIN — OXYCODONE HYDROCHLORIDE AND ACETAMINOPHEN 2 TABLET: 5; 325 TABLET ORAL at 13:49

## 2020-11-08 RX ADMIN — INSULIN GLARGINE 70 UNITS: 100 INJECTION, SOLUTION SUBCUTANEOUS at 21:38

## 2020-11-08 RX ADMIN — DILTIAZEM HYDROCHLORIDE 60 MG: 60 TABLET, FILM COATED ORAL at 08:50

## 2020-11-08 RX ADMIN — ATORVASTATIN CALCIUM 40 MG: 40 TABLET, FILM COATED ORAL at 21:38

## 2020-11-08 RX ADMIN — CEFEPIME 2 G: 2 INJECTION, POWDER, FOR SOLUTION INTRAVENOUS at 11:57

## 2020-11-08 RX ADMIN — INSULIN LISPRO 2 UNITS: 100 INJECTION, SOLUTION INTRAVENOUS; SUBCUTANEOUS at 17:07

## 2020-11-08 RX ADMIN — CEFEPIME 2 G: 2 INJECTION, POWDER, FOR SOLUTION INTRAVENOUS at 23:48

## 2020-11-08 RX ADMIN — OXYCODONE HYDROCHLORIDE AND ACETAMINOPHEN 2 TABLET: 5; 325 TABLET ORAL at 22:18

## 2020-11-08 ASSESSMENT — PAIN DESCRIPTION - DESCRIPTORS
DESCRIPTORS: DISCOMFORT
DESCRIPTORS: ACHING;CONSTANT;STABBING

## 2020-11-08 ASSESSMENT — PAIN DESCRIPTION - ORIENTATION
ORIENTATION: LEFT
ORIENTATION: LEFT
ORIENTATION: RIGHT

## 2020-11-08 ASSESSMENT — PAIN SCALES - GENERAL
PAINLEVEL_OUTOF10: 7
PAINLEVEL_OUTOF10: 8
PAINLEVEL_OUTOF10: 5
PAINLEVEL_OUTOF10: 7
PAINLEVEL_OUTOF10: 7

## 2020-11-08 ASSESSMENT — PAIN DESCRIPTION - LOCATION
LOCATION: FOOT
LOCATION: LEG;FOOT
LOCATION: FOOT

## 2020-11-08 ASSESSMENT — PAIN DESCRIPTION - PAIN TYPE
TYPE: ACUTE PAIN;CHRONIC PAIN
TYPE: ACUTE PAIN
TYPE: ACUTE PAIN

## 2020-11-08 ASSESSMENT — PAIN DESCRIPTION - ONSET
ONSET: ON-GOING
ONSET: ON-GOING

## 2020-11-08 ASSESSMENT — PAIN DESCRIPTION - FREQUENCY
FREQUENCY: CONTINUOUS
FREQUENCY: CONTINUOUS
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
## 2020-11-08 NOTE — PLAN OF CARE
Problem: Pain:  Goal: Pain level will decrease  Description: Pain level will decrease  11/8/2020 1230 by George Villarreal RN  Outcome: Ongoing  11/8/2020 0241 by Geri Romberg, RN  Outcome: Ongoing   Pt taking PRN Percocet for pain effective for pain control  Problem: Skin Integrity:  Goal: Will show no infection signs and symptoms  Description: Will show no infection signs and symptoms  11/8/2020 1230 by George Villarreal RN  Outcome: Ongoing  11/8/2020 0241 by Geri Romberg, RN  Outcome: Ongoing   IV atb , POD consult, daily drsg changes monitoring vitals, labs

## 2020-11-08 NOTE — PLAN OF CARE
Problem: Pain:  Goal: Pain level will decrease  Description: Pain level will decrease  Outcome: Ongoing  Goal: Control of acute pain  Description: Control of acute pain  11/8/2020 0241 by Leonila Elise RN  Outcome: Ongoing  11/7/2020 1324 by Jenniffer Mclaughlin RN  Outcome: Ongoing  Goal: Control of chronic pain  Description: Control of chronic pain  Outcome: Ongoing     Problem: Skin Integrity:  Goal: Will show no infection signs and symptoms  Description: Will show no infection signs and symptoms  11/8/2020 0241 by Leonila Elise RN  Outcome: Ongoing  11/7/2020 1324 by Jenniffer Mclaughlin RN  Outcome: Ongoing  Goal: Absence of new skin breakdown  Description: Absence of new skin breakdown  Outcome: Ongoing  Goal: Risk for impaired skin integrity will decrease  Description: Risk for impaired skin integrity will decrease  Outcome: Ongoing     Problem: Falls - Risk of:  Goal: Will remain free from falls  Description: Will remain free from falls  Outcome: Ongoing  Goal: Absence of physical injury  Description: Absence of physical injury  Outcome: Ongoing     Problem: Nutrition  Goal: Optimal nutrition therapy  Outcome: Ongoing     Problem:  Activity:  Goal: Risk for activity intolerance will decrease  Description: Risk for activity intolerance will decrease  Outcome: Ongoing     Problem: Coping:  Goal: Ability to adjust to condition or change in health will improve  Description: Ability to adjust to condition or change in health will improve  Outcome: Ongoing     Problem: Fluid Volume:  Goal: Ability to maintain a balanced intake and output will improve  Description: Ability to maintain a balanced intake and output will improve  Outcome: Ongoing     Problem: Health Behavior:  Goal: Ability to identify and utilize available resources and services will improve  Description: Ability to identify and utilize available resources and services will improve  Outcome: Ongoing  Goal: Ability to manage health-related needs will improve  Description: Ability to manage health-related needs will improve  Outcome: Ongoing     Problem: Metabolic:  Goal: Ability to maintain appropriate glucose levels will improve  Description: Ability to maintain appropriate glucose levels will improve  Outcome: Ongoing     Problem: Nutritional:  Goal: Maintenance of adequate nutrition will improve  Description: Maintenance of adequate nutrition will improve  Outcome: Ongoing  Goal: Progress toward achieving an optimal weight will improve  Description: Progress toward achieving an optimal weight will improve  Outcome: Ongoing     Problem: Physical Regulation:  Goal: Complications related to the disease process, condition or treatment will be avoided or minimized  Description: Complications related to the disease process, condition or treatment will be avoided or minimized  Outcome: Ongoing  Goal: Diagnostic test results will improve  Description: Diagnostic test results will improve  Outcome: Ongoing     Problem: Tissue Perfusion:  Goal: Adequacy of tissue perfusion will improve  Description: Adequacy of tissue perfusion will improve  Outcome: Ongoing

## 2020-11-09 LAB
BUN BLDV-MCNC: 21 MG/DL (ref 8–23)
CREAT SERPL-MCNC: 0.8 MG/DL (ref 0.7–1.2)
CULTURE: NORMAL
CULTURE: NORMAL
GFR AFRICAN AMERICAN: >60 ML/MIN
GFR NON-AFRICAN AMERICAN: >60 ML/MIN
GFR SERPL CREATININE-BSD FRML MDRD: NORMAL ML/MIN/{1.73_M2}
GFR SERPL CREATININE-BSD FRML MDRD: NORMAL ML/MIN/{1.73_M2}
GLUCOSE BLD-MCNC: 156 MG/DL (ref 75–110)
GLUCOSE BLD-MCNC: 218 MG/DL (ref 75–110)
GLUCOSE BLD-MCNC: 231 MG/DL (ref 75–110)
GLUCOSE BLD-MCNC: 344 MG/DL (ref 75–110)
GLUCOSE BLD-MCNC: 53 MG/DL (ref 75–110)
GLUCOSE BLD-MCNC: 70 MG/DL (ref 75–110)
Lab: NORMAL
Lab: NORMAL
SPECIMEN DESCRIPTION: NORMAL
SPECIMEN DESCRIPTION: NORMAL

## 2020-11-09 PROCEDURE — 82565 ASSAY OF CREATININE: CPT

## 2020-11-09 PROCEDURE — 6360000002 HC RX W HCPCS: Performed by: NURSE PRACTITIONER

## 2020-11-09 PROCEDURE — 82947 ASSAY GLUCOSE BLOOD QUANT: CPT

## 2020-11-09 PROCEDURE — 99231 SBSQ HOSP IP/OBS SF/LOW 25: CPT | Performed by: INTERNAL MEDICINE

## 2020-11-09 PROCEDURE — 36415 COLL VENOUS BLD VENIPUNCTURE: CPT

## 2020-11-09 PROCEDURE — 1200000000 HC SEMI PRIVATE

## 2020-11-09 PROCEDURE — 6370000000 HC RX 637 (ALT 250 FOR IP): Performed by: NURSE PRACTITIONER

## 2020-11-09 PROCEDURE — 2580000003 HC RX 258: Performed by: NURSE PRACTITIONER

## 2020-11-09 PROCEDURE — 99232 SBSQ HOSP IP/OBS MODERATE 35: CPT | Performed by: FAMILY MEDICINE

## 2020-11-09 PROCEDURE — 84520 ASSAY OF UREA NITROGEN: CPT

## 2020-11-09 PROCEDURE — 97530 THERAPEUTIC ACTIVITIES: CPT

## 2020-11-09 RX ADMIN — OXYCODONE HYDROCHLORIDE AND ACETAMINOPHEN 2 TABLET: 5; 325 TABLET ORAL at 21:01

## 2020-11-09 RX ADMIN — INSULIN LISPRO 4 UNITS: 100 INJECTION, SOLUTION INTRAVENOUS; SUBCUTANEOUS at 17:56

## 2020-11-09 RX ADMIN — INSULIN GLARGINE 70 UNITS: 100 INJECTION, SOLUTION SUBCUTANEOUS at 21:01

## 2020-11-09 RX ADMIN — ATORVASTATIN CALCIUM 40 MG: 40 TABLET, FILM COATED ORAL at 21:01

## 2020-11-09 RX ADMIN — OXYCODONE HYDROCHLORIDE AND ACETAMINOPHEN 2 TABLET: 5; 325 TABLET ORAL at 06:02

## 2020-11-09 RX ADMIN — DILTIAZEM HYDROCHLORIDE 60 MG: 60 TABLET, FILM COATED ORAL at 12:45

## 2020-11-09 RX ADMIN — INSULIN LISPRO 4 UNITS: 100 INJECTION, SOLUTION INTRAVENOUS; SUBCUTANEOUS at 12:45

## 2020-11-09 RX ADMIN — DILTIAZEM HYDROCHLORIDE 60 MG: 60 TABLET, FILM COATED ORAL at 06:02

## 2020-11-09 RX ADMIN — OXYCODONE HYDROCHLORIDE AND ACETAMINOPHEN 2 TABLET: 5; 325 TABLET ORAL at 15:01

## 2020-11-09 RX ADMIN — APIXABAN 5 MG: 5 TABLET, FILM COATED ORAL at 21:00

## 2020-11-09 RX ADMIN — METFORMIN HYDROCHLORIDE 500 MG: 500 TABLET ORAL at 08:34

## 2020-11-09 RX ADMIN — APIXABAN 5 MG: 5 TABLET, FILM COATED ORAL at 08:34

## 2020-11-09 RX ADMIN — NORTRIPTYLINE HYDROCHLORIDE 50 MG: 25 CAPSULE ORAL at 21:00

## 2020-11-09 RX ADMIN — DILTIAZEM HYDROCHLORIDE 60 MG: 60 TABLET, FILM COATED ORAL at 17:56

## 2020-11-09 RX ADMIN — ANTACID TABLETS 1000 MG: 500 TABLET, CHEWABLE ORAL at 00:10

## 2020-11-09 RX ADMIN — SODIUM CHLORIDE 500 MG: 9 INJECTION, SOLUTION INTRAVENOUS at 14:55

## 2020-11-09 RX ADMIN — METFORMIN HYDROCHLORIDE 500 MG: 500 TABLET ORAL at 17:56

## 2020-11-09 RX ADMIN — FAMOTIDINE 20 MG: 20 TABLET, FILM COATED ORAL at 21:01

## 2020-11-09 RX ADMIN — CEFEPIME 2 G: 2 INJECTION, POWDER, FOR SOLUTION INTRAVENOUS at 12:45

## 2020-11-09 RX ADMIN — INSULIN LISPRO 4 UNITS: 100 INJECTION, SOLUTION INTRAVENOUS; SUBCUTANEOUS at 21:01

## 2020-11-09 RX ADMIN — FAMOTIDINE 20 MG: 20 TABLET, FILM COATED ORAL at 08:34

## 2020-11-09 ASSESSMENT — PAIN DESCRIPTION - LOCATION: LOCATION: FOOT

## 2020-11-09 ASSESSMENT — ENCOUNTER SYMPTOMS
RESPIRATORY NEGATIVE: 1
GASTROINTESTINAL NEGATIVE: 1

## 2020-11-09 ASSESSMENT — PAIN DESCRIPTION - DESCRIPTORS: DESCRIPTORS: DISCOMFORT;SORE

## 2020-11-09 ASSESSMENT — PAIN DESCRIPTION - ONSET: ONSET: ON-GOING

## 2020-11-09 ASSESSMENT — PAIN DESCRIPTION - PAIN TYPE: TYPE: ACUTE PAIN

## 2020-11-09 ASSESSMENT — PAIN DESCRIPTION - FREQUENCY: FREQUENCY: CONTINUOUS

## 2020-11-09 ASSESSMENT — PAIN DESCRIPTION - ORIENTATION: ORIENTATION: LEFT

## 2020-11-09 NOTE — PLAN OF CARE
Problem: Pain:  Goal: Pain level will decrease  Description: Pain level will decrease  11/9/2020 0012 by Nicolas Elliott RN  Outcome: Ongoing  11/8/2020 1230 by Lisette Lopez RN  Outcome: Ongoing  Goal: Control of acute pain  Description: Control of acute pain  11/9/2020 0012 by Nicolas Elliott RN  Outcome: Ongoing  11/8/2020 1230 by Lisette Loepz RN  Outcome: Ongoing  Goal: Control of chronic pain  Description: Control of chronic pain  Outcome: Ongoing     Problem: Skin Integrity:  Goal: Will show no infection signs and symptoms  Description: Will show no infection signs and symptoms  11/9/2020 0012 by Nicolas Elliott RN  Outcome: Ongoing  11/8/2020 1230 by Lisette Lopez RN  Outcome: Ongoing  Goal: Absence of new skin breakdown  Description: Absence of new skin breakdown  11/9/2020 0012 by Nicolas Elliott RN  Outcome: Ongoing  11/8/2020 1230 by Lisette Lopez RN  Outcome: Ongoing  Goal: Risk for impaired skin integrity will decrease  Description: Risk for impaired skin integrity will decrease  Outcome: Ongoing     Problem: Falls - Risk of:  Goal: Will remain free from falls  Description: Will remain free from falls  Outcome: Ongoing  Goal: Absence of physical injury  Description: Absence of physical injury  Outcome: Ongoing     Problem: Nutrition  Goal: Optimal nutrition therapy  Outcome: Ongoing     Problem:  Activity:  Goal: Risk for activity intolerance will decrease  Description: Risk for activity intolerance will decrease  Outcome: Ongoing     Problem: Coping:  Goal: Ability to adjust to condition or change in health will improve  Description: Ability to adjust to condition or change in health will improve  Outcome: Ongoing     Problem: Fluid Volume:  Goal: Ability to maintain a balanced intake and output will improve  Description: Ability to maintain a balanced intake and output will improve  Outcome: Ongoing     Problem: Health Behavior:  Goal: Ability to identify and utilize available resources and services will improve  Description: Ability to identify and utilize available resources and services will improve  Outcome: Ongoing  Goal: Ability to manage health-related needs will improve  Description: Ability to manage health-related needs will improve  Outcome: Ongoing     Problem: Metabolic:  Goal: Ability to maintain appropriate glucose levels will improve  Description: Ability to maintain appropriate glucose levels will improve  Outcome: Ongoing     Problem: Nutritional:  Goal: Maintenance of adequate nutrition will improve  Description: Maintenance of adequate nutrition will improve  Outcome: Ongoing  Goal: Progress toward achieving an optimal weight will improve  Description: Progress toward achieving an optimal weight will improve  Outcome: Ongoing     Problem: Physical Regulation:  Goal: Complications related to the disease process, condition or treatment will be avoided or minimized  Description: Complications related to the disease process, condition or treatment will be avoided or minimized  Outcome: Ongoing  Goal: Diagnostic test results will improve  Description: Diagnostic test results will improve  Outcome: Ongoing     Problem: Tissue Perfusion:  Goal: Adequacy of tissue perfusion will improve  Description: Adequacy of tissue perfusion will improve  Outcome: Ongoing

## 2020-11-09 NOTE — PROGRESS NOTES
Physical Therapy  DATE: 2020    NAME: Sofia Search  MRN: [id number]   : 1955    Patient not seen this date for Physical Therapy due to:  [] Blood transfusion in progress  [] Cancel by RN  [] Hemodialysis  [x]  Refusal by Patient  (2nd attempt patient declined PT treatment, even with max encouragement to participate. Patient reports that his is in too much pain)   [] Spine Precautions   [] Strict Bedrest  [] Surgery  [] Testing      [x] Other (1st attempt patient up with OT staff.)        [] PT being discontinued at this time. Patient independent. No further needs. [] PT being discontinued at this time as the patient has been transferred to hospice care. No further needs.     Abena Cherry, PTA

## 2020-11-09 NOTE — PROGRESS NOTES
[facility]  Progress Note    Glendy Leone  MRN: [id number]  Date: 11/9/2020  LOS: 6     Reason for F/U :   Diabetic foot infection    Impression :   1. Left transmetatarsal stump infection with osteomyelitis of the first metatarsal and fifth metatarsal  2. Diabetes mellitus with associated neuropathy    Recommendations:   · The patient's current cultures with no organisms but prior cultures in August 2020 had VRE, Proteus mirabilis, and coagulase-negative staph  · The patient is currently on cefepime and daptomycin to complete a 6-week course of therapy through December 15, 2020  · CBC , BMP, CPK and CRP every Monday  · Follow up in ID clinic with Dr. Brigid Medina in 3-4 weeks. · Patient continues to await insurance precertification.     Infection Control Recommendations:   Contact precautions    Discharge Planning:   Estimated Length of IV antimicrobials: December 15, 2020  Patient will need PICC line Insertion  Patient will need: SNF  Patient willneed outpatient wound care: Yes    Medical Decision making / Summary of Stay:   Jameson Cardenas is a 63y.o.-year-old male who was initially admitted on 11/3/2020 at the request of his home care nurse due to drainage from his wound.  Patient has a known medical history of uncontrolled diabetes mellitus with peripheral neuropathy, peripheral arterial disease, esophageal cancer, COPD, tobacco abuse. 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     Patient is known to our practice from multiple hospitalizations since July 2020. Reilly Burch was initially admitted and seen by our service July 28, 2020 for a left hallux gangrene/left foot cellulitis and plantar ulcerations.  Patient underwent left superficial femoral, popliteal, tibial, peroneal trunk, and posterior tibial artery atherectomy/balloon angioplasty 7/29/2020.  Patient then underwent left TMA with Achilles tendon lengthening 2020. Jeremy Tidwell was discharged 2022 rehabilitation on doxycycline and Augmentin for 1 week.     2020 patient returned to [address] [facility] states that he never received wound care and that he started to feel funny. Jeremy Tidwell was found to have wound dehiscence with concern for underlying osteomyelitis.  On 2020 patient underwent incision and drainage of the left foot with revision of TMA of the left foot.  Cultures did grow VRE and Proteus mirabilis as well as a coagulase-negative Staphylococcus.  Patient was discharged on oral ciprofloxacin and linezolid through 2020 to complete a 4-week course.     Patient now tells me that he has been at home, unable to tell me definitively for how long. Megan Diaz has had what home care come out to assess the foot daily and when they came out to see him yesterday they noticed that his foot was draining and instructed him to come to the emergency department. Jeremy Tidwell is not sure how long his foot has looked like this and he does not feel any pain due to peripheral neuropathy.  He was unaware that he had a wound on the lateral aspect of his foot.  He does not know any fevers or chills.  No generalized body aches.  No nausea, vomiting or diarrhea.  Patient had an x-ray and CT scan of the left foot concerning for residual first metatarsal osteomyelitis.  Patient has been started on vancomycin and Zosyn.  We were consulted due to the osteomyelitis and to assist with antimicrobial therapy management. Current evaluation:2020    /72   Pulse 92   Temp 97.8 °F (36.6 °C) (Oral)   Resp 16   Ht 6' 0.01\" (1.829 m)   Wt 163 lb 3.2 oz (74 kg)   SpO2 96%   BMI 22.13 kg/m²     Temperature Range: Temp: 97.8 °F (36.6 °C) Temp  Av.7 °F (36.5 °C)  Min: 97.6 °F (36.4 °C)  Max: 97.8 °F (36.6 °C)  The patient is seen and evaluated at bedside he is awake and alert in no acute distress. No subjective fever or chills.   No cough or shortness of breath. No abdominal pain nausea vomiting or diarrhea. Review of Systems   Constitutional: Negative. Respiratory: Negative. Cardiovascular: Negative. Gastrointestinal: Negative. Genitourinary: Negative. Musculoskeletal: Negative. Skin: Positive for wound. Neurological: Negative. Physical Examination :     Physical Exam  Constitutional:       Appearance: He is well-developed. HENT:      Head: Normocephalic and atraumatic. Neck:      Musculoskeletal: Normal range of motion and neck supple. Cardiovascular:      Rate and Rhythm: Normal rate. Heart sounds: Normal heart sounds. No friction rub. No gallop. Pulmonary:      Effort: Pulmonary effort is normal.      Breath sounds: Normal breath sounds. No wheezing. Abdominal:      General: Bowel sounds are normal.      Palpations: Abdomen is soft. There is no mass. Tenderness: There is no abdominal tenderness. Comments: Well-healed midline abdominal scar   Musculoskeletal: Normal range of motion. Comments: Right BKA stump intact   Lymphadenopathy:      Cervical: No cervical adenopathy. Skin:     General: Skin is warm and dry. Comments: Dressing of the left foot not removed  Multiple scabbed lesions in the left lower extremity anteriorly   Neurological:      Mental Status: He is alert and oriented to person, place, and time. Laboratory data:   I have independently reviewed the followinglabs:  CBC with Differential:   Recent Labs     11/08/20  0454   WBC 9.5   HGB 10.4*   HCT 35.4*      LYMPHOPCT 21*   MONOPCT 11     BMP:   Recent Labs     11/08/20  0454 11/09/20  0504     --    K 4.0  --      --    CO2 27  --    BUN 15 21   CREATININE 0.83 0.80   MG 1.7  --      Hepatic Function Panel: No results for input(s): PROT, LABALBU, BILIDIR, IBILI, BILITOT, ALKPHOS, ALT, AST in the last 72 hours.       No results found for: PROCAL  Lab Results   Component Value Date    CRP 67.9 11/03/2020 CRP 57.9 09/03/2020    .7 09/01/2020     Lab Results   Component Value Date    SEDRATE 53 (H) 11/03/2020         Lab Results   Component Value Date    DDIMER 0.39 06/29/2020    DDIMER 1.63 12/20/2017    DDIMER 0.68 12/25/2011     Lab Results   Component Value Date    FERRITIN 64 01/25/2014     No results found for: LDH  No results found for: FIBRINOGEN    No results found for requested labs within last 30 days. Lab Results   Component Value Date    COVID19 Not Detected 08/23/2020    COVID19 Not Detected 07/29/2020       No results for input(s): VANCOTROUGH in the last 72 hours. Imaging Studies:   No new imaging    Cultures:     Culture, Blood 1 [[id number]]   Collected: 11/03/20 0630    Order Status: Completed  Specimen: Blood  Updated: 11/09/20 1034     Specimen Description  . BLOOD     Special Requests  10 ML RAC     Culture  NO GROWTH 6 DAYS    Culture, Blood 1 [[id number]]   Collected: 11/03/20 0704    Order Status: Completed  Specimen: Blood  Updated: 11/09/20 2267     Specimen Description  . BLOOD     Special Requests  10ML LAC     Culture  NO GROWTH 6 DAYS    Culture, Anaerobic and Aerobic [[id number]]  (Abnormal)  Collected: 11/03/20 0921    Order Status: Completed  Specimen: Foot  Updated: 11/08/20 1656     Specimen Description  . FOOT LEFT     Special Requests  NOT REPORTED     Direct Exam  FEW NEUTROPHILSAbnormal        NO BACTERIA SEEN     Culture  NORMAL ANNA MARIE      NO ANAEROBIC ORGANISMS ISOLATED AT 5 DAYSAbnormal         Medications:      cefepime  2 g Intravenous Q12H    daptomycin (CUBICIN) IVPB  500 mg Intravenous Q24H    apixaban  5 mg Oral BID    atorvastatin  40 mg Oral Nightly    dilTIAZem  60 mg Oral 4 times per day    famotidine  20 mg Oral BID    insulin glargine  70 Units Subcutaneous Nightly    insulin lispro  0-12 Units Subcutaneous TID WC    insulin lispro  0-6 Units Subcutaneous Nightly    metFORMIN  500 mg Oral BID WC    nortriptyline  50 mg Oral Nightly    sodium chloride flush  10 mL Intravenous 2 times per day           [facility]  [address] [facility] messaging  OFFICE: [phone]      Electronically signed by Sergey Townsend MD on 11/9/2020 at 11:58 AM  Thank you for allowing us to participate in the care of this patient. Please call with questions. This note iscreated with the assistance of a speech recognition program.  While intending to generate a document that actually reflects the content of the visit, the document can still have some errors including those of syntax andsound a like substitutions which may escape proof reading. In such instances, actual meaning can be extrapolated by contextual diversion.
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
## 2020-11-09 NOTE — PROGRESS NOTES
Patients BS 53, writer gave patient orange juice and encouraged to drink, will recheck in 15 minutes. Recheck BS 70.

## 2020-11-09 NOTE — PLAN OF CARE
Problem: Pain:  Goal: Pain level will decrease  Description: Pain level will decrease  Outcome: Ongoing  Note: Pain level assessed and rated on a 0-10 scale  Assess characteristics of pain  PRN pain medication given per pt request  Non-pharmacological interventions implemented  Report ineffective pain management to physician  Update pt and family of any changes  Pt instructed to call out with new onset of pain  Continue to monitor       Problem: Falls - Risk of:  Goal: Will remain free from falls  Description: Will remain free from falls  Outcome: Ongoing  Note: Room free of clutter  Hourly rounding   Non-skid socks worn  Side rails up x2  Bed low and locked  Call light in reach  Instructed to call out before getting out of bed  Anticipatory needs met  Bed alarm on  Falling star at the door and on wristband

## 2020-11-09 NOTE — PROGRESS NOTES
Comprehensive Nutrition Assessment    Type and Reason for Visit:  Reassess    Nutrition Recommendations/Plan:   1. Continue Carb Controlled diet  2. Continue Glucerna 2x/day - suggest continuing after discharge to assist with providing nutrients for wound healing  3. Continue to monitor and record PO intakes    Nutrition Assessment:  Patient with good/fair appetite depending on the day, but overall appears to be stable from a nutritional standpoint. Suggest continuing Glucerna 2x/day for additional nutrient supplementation for wound healing and Carb controlled diet. Monitor PO intakes and wound status. Malnutrition Assessment:  Malnutrition Status:  Mild malnutrition    Context:  Acute Illness     Findings of the 6 clinical characteristics of malnutrition:  Energy Intake:  7 - 50% or less of estimated energy requirements for 5 or more days  Weight Loss:  No significant weight loss(5% loss in 3 months)     Body Fat Loss:  Unable to assess     Muscle Mass Loss:  Unable to assess    Fluid Accumulation:  No significant fluid accumulation Extremities   Strength:  Not Performed    Estimated Daily Nutrient Needs:  Energy (kcal):  2187-7856 kcal based on 28-30 kcal/kg; Weight Used for Energy Requirements:  Current     Protein (g):   gm based on 1.3-1.5 gm/kg; Weight Used for Protein Requirements:  Current          Nutrition Related Findings:  trace LLE edema. Status post Incision and Drainage of left foot with revision of TMA 8/24/2020      Wounds:  Diabetic Ulcer       Current Nutrition Therapies:    DIET CARB CONTROL;   Dietary Nutrition Supplements: Diabetic Oral Supplement    Anthropometric Measures:  · Height: 6' 0.01\" (182.9 cm)  · Current Body Weight: 163 lb 2.3 oz (74 kg)   · Admission Body Weight: 164 lb (74.4 kg)(not determined)    · Usual Body Weight: 161 lb (73 kg)     · Ideal Body Weight: 178 lbs; % Ideal Body Weight 86 %   · BMI: 22.1  · Adjusted Body Weight:  ; No Adjustment   · BMI Categories: Normal Weight (BMI 18.5-24. 9)       Nutrition Diagnosis:   · Mild malnutrition related to inadequate protein-energy intake as evidenced by intake 26-50%, weight loss, intake 51-75%      Nutrition Interventions:   Food and/or Nutrient Delivery:  Continue Current Diet, Continue Oral Nutrition Supplement  Nutrition Education/Counseling:  Education not indicated   Coordination of Nutrition Care:  Continue to monitor while inpatient    Goals:  PO intakes are greater than 75% at meals       Nutrition Monitoring and Evaluation:   Behavioral-Environmental Outcomes:  Knowledge or Skill   Food/Nutrient Intake Outcomes:  Food and Nutrient Intake, Supplement Intake  Physical Signs/Symptoms Outcomes:  Fluid Status or Edema, Skin, Weight     Discharge Planning:    Continue Oral Nutrition Supplement, Continue current diet     Some areas of assessment may be incomplete due to COVID-19 precautions.     Faisal Terry RDN, OLGAN  RD Office Phone: [phone]

## 2020-11-09 NOTE — PROGRESS NOTES
VASCULAR SURGERY  PROGRESS NOTE      11/9/2020 3:54 PM  Subjective:   Admit Date: 11/3/2020  PCP: Reggie Dhillon DO    Chief Complaint   Patient presents with    Foot Pain     Interval History: No complaints. Still awaiting ECF precertification. Diet: DIET CARB CONTROL; Dietary Nutrition Supplements: Diabetic Oral Supplement    Medications:   Scheduled Meds:   cefepime  2 g Intravenous Q12H    daptomycin (CUBICIN) IVPB  500 mg Intravenous Q24H    apixaban  5 mg Oral BID    atorvastatin  40 mg Oral Nightly    dilTIAZem  60 mg Oral 4 times per day    famotidine  20 mg Oral BID    insulin glargine  70 Units Subcutaneous Nightly    insulin lispro  0-12 Units Subcutaneous TID WC    insulin lispro  0-6 Units Subcutaneous Nightly    metFORMIN  500 mg Oral BID WC    nortriptyline  50 mg Oral Nightly    sodium chloride flush  10 mL Intravenous 2 times per day     Continuous Infusions:   sodium chloride 75 mL/hr at 11/07/20 1147    dextrose           Labs:   CBC:   Recent Labs     11/08/20  0454   WBC 9.5   HGB 10.4*        BMP:    Recent Labs     11/07/20  0517 11/08/20  0454 11/09/20  0504   NA  --  140  --    K  --  4.0  --    CL  --  106  --    CO2  --  27  --    BUN 15 15 21   CREATININE 0.69* 0.83 0.80   GLUCOSE  --  202*  --      Hepatic: No results for input(s): AST, ALT, ALB, BILITOT, ALKPHOS in the last 72 hours. Troponin: Invalid input(s): TROPONIN  BNP: No results for input(s): BNP in the last 72 hours. Lipids: No results for input(s): CHOL, HDL in the last 72 hours. Invalid input(s): LDLCALCU  INR: No results for input(s): INR in the last 72 hours.     Objective:   Vitals: /72   Pulse 92   Temp 97.8 °F (36.6 °C) (Oral)   Resp 16   Ht 6' 0.01\" (1.829 m)   Wt 163 lb 3.2 oz (74 kg)   SpO2 96%   BMI 22.13 kg/m²   General appearance: alert, cooperative and no distress  Mental Status: oriented to person, place and time with normal affect  Neck: good carotid pulses, no amputation of right lower extremity (HCC)     Hyperglycemia     Moderate protein malnutrition ([address]     Essential hypertension     Uncontrolled type 2 diabetes mellitus with hyperglycemia ([address]     History of pulmonary embolism - 2017     Atelectasis     Uncontrolled type 2 diabetes mellitus with foot ulcer (HCC)     Azotemia     Anemia, normocytic normochromic     Osteomyelitis of fourth phalange of left foot (HCC)     Drug-induced constipation     Osteomyelitis (HCC)     Diabetic foot infection ([address]     Noncompliance     Type 1 diabetes mellitus with diabetic foot infection ([address]     Acute osteomyelitis of left foot (HCC)     Cellulitis of left foot     Mild malnutrition ([address]      Plan:   1. Continue IV ABX  2. Continue local wound care  3.  [id number] [address] Coronado for discharge from a vascular standpoint    Electronically signed by Charles Thapa MD on 11/9/2020 at 3:54 PM

## 2020-11-09 NOTE — PROGRESS NOTES
VASCULAR SURGERY  PROGRESS NOTE      11/8/2020 9:05 PM  Subjective:   Admit Date: 11/3/2020  PCP: Juanjo Isaac DO    Chief Complaint   Patient presents with    Foot Pain     Interval History: No complaints. Still awaiting F approval.    Diet: DIET CARB CONTROL; Dietary Nutrition Supplements: Diabetic Oral Supplement    Medications:   Scheduled Meds:   cefepime  2 g Intravenous Q12H    daptomycin (CUBICIN) IVPB  500 mg Intravenous Q24H    apixaban  5 mg Oral BID    atorvastatin  40 mg Oral Nightly    dilTIAZem  60 mg Oral 4 times per day    famotidine  20 mg Oral BID    insulin glargine  70 Units Subcutaneous Nightly    insulin lispro  0-12 Units Subcutaneous TID WC    insulin lispro  0-6 Units Subcutaneous Nightly    metFORMIN  500 mg Oral BID WC    nortriptyline  50 mg Oral Nightly    sodium chloride flush  10 mL Intravenous 2 times per day     Continuous Infusions:   sodium chloride 75 mL/hr at 11/07/20 1147    dextrose           Labs:   CBC:   Recent Labs     11/08/20  0454   WBC 9.5   HGB 10.4*        BMP:    Recent Labs     11/07/20  0517 11/08/20  0454   NA  --  140   K  --  4.0   CL  --  106   CO2  --  27   BUN 15 15   CREATININE 0.69* 0.83   GLUCOSE  --  202*     Hepatic: No results for input(s): AST, ALT, ALB, BILITOT, ALKPHOS in the last 72 hours. Troponin: Invalid input(s): TROPONIN  BNP: No results for input(s): BNP in the last 72 hours. Lipids: No results for input(s): CHOL, HDL in the last 72 hours. Invalid input(s): LDLCALCU  INR: No results for input(s): INR in the last 72 hours.     Objective:   Vitals: BP (!) 145/63   Pulse 108   Temp 97.8 °F (36.6 °C) (Oral)   Resp 18   Ht 6' 0.01\" (1.829 m)   Wt 164 lb 6.4 oz (74.6 kg)   SpO2 95%   BMI 22.29 kg/m²   General appearance: alert, cooperative and no distress  Mental Status: oriented to person, place and time with normal affect  Neck: good carotid pulses, no JVD  Lungs: clear to auscultation bilaterally, Hyperglycemia     Moderate protein malnutrition (HCC)     Essential hypertension     Uncontrolled type 2 diabetes mellitus with hyperglycemia ([address]     History of pulmonary embolism - 2017     Atelectasis     Uncontrolled type 2 diabetes mellitus with foot ulcer (HCC)     Azotemia     Anemia, normocytic normochromic     Osteomyelitis of fourth phalange of left foot (HCC)     Drug-induced constipation     Osteomyelitis (HCC)     Diabetic foot infection ([address]     Noncompliance     Type 1 diabetes mellitus with diabetic foot infection ([address]     Acute osteomyelitis of left foot (HCC)     Cellulitis of left foot     Mild malnutrition ([address]      Plan:   1. Continue IV ABX  2. Continue local wound care  3.  [place] for discharge from a vascular standpoint    Electronically signed by Latonya Wang MD on 11/8/2020 at 9:05 PM

## 2020-11-09 NOTE — PROGRESS NOTES
safety with transfers and functional mobility  Short term goal 2: Pt will demo mod I and safety with ADLs  Short term goal 3: Pt will demo G understanding and follow through of WB status, safety awareness, and EC  Short term goal 4: Pt will tolerate administration of SLUMS  Short term goal 5: Pt will increase activity tolerance to engage in at least 20-30 minutes of dynamic seated/standing activity with no more than min rest breaks PRN        Therapy Time   Individual Concurrent Group Co-treatment   Time In 1138         Time Out 1202         Minutes Jennifer 38, PJ

## 2020-11-10 LAB
GLUCOSE BLD-MCNC: 174 MG/DL (ref 75–110)
GLUCOSE BLD-MCNC: 177 MG/DL (ref 75–110)
GLUCOSE BLD-MCNC: 203 MG/DL (ref 75–110)
GLUCOSE BLD-MCNC: 231 MG/DL (ref 75–110)
GLUCOSE BLD-MCNC: 70 MG/DL (ref 75–110)
GLUCOSE BLD-MCNC: 91 MG/DL (ref 75–110)
SARS-COV-2, RAPID: NOT DETECTED
SARS-COV-2: NORMAL
SARS-COV-2: NORMAL
SOURCE: NORMAL

## 2020-11-10 PROCEDURE — 6360000002 HC RX W HCPCS: Performed by: NURSE PRACTITIONER

## 2020-11-10 PROCEDURE — 99232 SBSQ HOSP IP/OBS MODERATE 35: CPT | Performed by: INTERNAL MEDICINE

## 2020-11-10 PROCEDURE — 6370000000 HC RX 637 (ALT 250 FOR IP): Performed by: NURSE PRACTITIONER

## 2020-11-10 PROCEDURE — 2500000003 HC RX 250 WO HCPCS: Performed by: NURSE PRACTITIONER

## 2020-11-10 PROCEDURE — 99239 HOSP IP/OBS DSCHRG MGMT >30: CPT | Performed by: INTERNAL MEDICINE

## 2020-11-10 PROCEDURE — 6370000000 HC RX 637 (ALT 250 FOR IP): Performed by: INTERNAL MEDICINE

## 2020-11-10 PROCEDURE — 2580000003 HC RX 258: Performed by: NURSE PRACTITIONER

## 2020-11-10 PROCEDURE — 1200000000 HC SEMI PRIVATE

## 2020-11-10 PROCEDURE — 97530 THERAPEUTIC ACTIVITIES: CPT

## 2020-11-10 PROCEDURE — 82947 ASSAY GLUCOSE BLOOD QUANT: CPT

## 2020-11-10 PROCEDURE — U0002 COVID-19 LAB TEST NON-CDC: HCPCS

## 2020-11-10 RX ORDER — LINEZOLID 600 MG/1
600 TABLET, FILM COATED ORAL EVERY 12 HOURS SCHEDULED
Status: DISCONTINUED | OUTPATIENT
Start: 2020-11-10 | End: 2020-11-12 | Stop reason: HOSPADM

## 2020-11-10 RX ORDER — LINEZOLID 600 MG/1
600 TABLET, FILM COATED ORAL EVERY 12 HOURS SCHEDULED
Qty: 28 TABLET | Refills: 0 | Status: SHIPPED | OUTPATIENT
Start: 2020-11-10 | End: 2020-11-24

## 2020-11-10 RX ADMIN — FAMOTIDINE 20 MG: 20 TABLET, FILM COATED ORAL at 21:09

## 2020-11-10 RX ADMIN — CEFEPIME 2 G: 2 INJECTION, POWDER, FOR SOLUTION INTRAVENOUS at 00:54

## 2020-11-10 RX ADMIN — OXYCODONE HYDROCHLORIDE AND ACETAMINOPHEN 2 TABLET: 5; 325 TABLET ORAL at 21:09

## 2020-11-10 RX ADMIN — INSULIN LISPRO 2 UNITS: 100 INJECTION, SOLUTION INTRAVENOUS; SUBCUTANEOUS at 11:59

## 2020-11-10 RX ADMIN — LINEZOLID 600 MG: 600 TABLET, FILM COATED ORAL at 21:16

## 2020-11-10 RX ADMIN — ATORVASTATIN CALCIUM 40 MG: 40 TABLET, FILM COATED ORAL at 21:09

## 2020-11-10 RX ADMIN — FAMOTIDINE 20 MG: 20 TABLET, FILM COATED ORAL at 08:05

## 2020-11-10 RX ADMIN — SODIUM CHLORIDE: 9 INJECTION, SOLUTION INTRAVENOUS at 04:17

## 2020-11-10 RX ADMIN — DILTIAZEM HYDROCHLORIDE 60 MG: 60 TABLET, FILM COATED ORAL at 19:10

## 2020-11-10 RX ADMIN — CEFEPIME 2 G: 2 INJECTION, POWDER, FOR SOLUTION INTRAVENOUS at 11:45

## 2020-11-10 RX ADMIN — APIXABAN 5 MG: 5 TABLET, FILM COATED ORAL at 08:05

## 2020-11-10 RX ADMIN — DILTIAZEM HYDROCHLORIDE 60 MG: 60 TABLET, FILM COATED ORAL at 11:45

## 2020-11-10 RX ADMIN — METOPROLOL TARTRATE 5 MG: 5 INJECTION INTRAVENOUS at 03:39

## 2020-11-10 RX ADMIN — DILTIAZEM HYDROCHLORIDE 60 MG: 60 TABLET, FILM COATED ORAL at 06:14

## 2020-11-10 RX ADMIN — INSULIN LISPRO 4 UNITS: 100 INJECTION, SOLUTION INTRAVENOUS; SUBCUTANEOUS at 16:44

## 2020-11-10 RX ADMIN — OXYCODONE HYDROCHLORIDE AND ACETAMINOPHEN 2 TABLET: 5; 325 TABLET ORAL at 15:13

## 2020-11-10 RX ADMIN — INSULIN LISPRO 1 UNITS: 100 INJECTION, SOLUTION INTRAVENOUS; SUBCUTANEOUS at 21:09

## 2020-11-10 RX ADMIN — INSULIN GLARGINE 70 UNITS: 100 INJECTION, SOLUTION SUBCUTANEOUS at 22:34

## 2020-11-10 RX ADMIN — APIXABAN 5 MG: 5 TABLET, FILM COATED ORAL at 21:09

## 2020-11-10 RX ADMIN — LINEZOLID 600 MG: 600 TABLET, FILM COATED ORAL at 11:45

## 2020-11-10 RX ADMIN — METFORMIN HYDROCHLORIDE 500 MG: 500 TABLET ORAL at 16:44

## 2020-11-10 RX ADMIN — METFORMIN HYDROCHLORIDE 500 MG: 500 TABLET ORAL at 08:05

## 2020-11-10 RX ADMIN — NORTRIPTYLINE HYDROCHLORIDE 50 MG: 25 CAPSULE ORAL at 21:09

## 2020-11-10 RX ADMIN — SODIUM CHLORIDE, PRESERVATIVE FREE 10 ML: 5 INJECTION INTRAVENOUS at 21:09

## 2020-11-10 RX ADMIN — DILTIAZEM HYDROCHLORIDE 60 MG: 60 TABLET, FILM COATED ORAL at 00:54

## 2020-11-10 ASSESSMENT — ENCOUNTER SYMPTOMS
GASTROINTESTINAL NEGATIVE: 1
RESPIRATORY NEGATIVE: 1

## 2020-11-10 ASSESSMENT — PAIN SCALES - GENERAL
PAINLEVEL_OUTOF10: 5
PAINLEVEL_OUTOF10: 7
PAINLEVEL_OUTOF10: 2

## 2020-11-10 ASSESSMENT — PAIN DESCRIPTION - LOCATION: LOCATION: FOOT

## 2020-11-10 ASSESSMENT — PAIN DESCRIPTION - PAIN TYPE: TYPE: CHRONIC PAIN

## 2020-11-10 NOTE — PROGRESS NOTES
[facility]  Progress Note    Leslie Gallego  MRN: [id number]  Date: 11/10/2020  LOS: 7     Reason for F/U :   Diabetic foot infection    Impression :   1. Left transmetatarsal stump infection with osteomyelitis of the first metatarsal and fifth metatarsal  2. Diabetes mellitus with associated neuropathy    Recommendations:   · The patient's current cultures with no organisms but prior cultures in August 2020 had VRE, Proteus mirabilis, and coagulase-negative staph  · The left foot was evaluated today and there continues to be signs of some ischemia of the lateral foot, dorsal aspect of the foot. · The cost of the daptomycin has been prohibitive to getting him placed at a facility  · I have switched him to Zyvox  · The patient is currently on cefepime and Zyvox to complete a 6-week course of therapy through December 15, 2020  · CBC , BMP, CPK and CRP every Monday  · Follow up in ID clinic with Dr. Adamaris Barrera in 3-4 weeks. · Patient continues to await insurance precertification.     Infection Control Recommendations:   Contact precautions    Discharge Planning:   Estimated Length of IV antimicrobials: December 15, 2020  Patient will need PICC line Insertion  Patient will need: SNF  Patient willneed outpatient wound care: Yes    Medical Decision making / Summary of Stay:   Esequiel Cardenas is a 63y.o.-year-old male who was initially admitted on 11/3/2020 at the request of his home care nurse due to drainage from his wound.  Patient has a known medical history of uncontrolled diabetes mellitus with peripheral neuropathy, peripheral arterial disease, esophageal cancer, COPD, tobacco abuse. Shira Chavez has a history of right below the knee amputation as well as left fifth ray amputation with subsequent TMA due to gangrene.     Patient is known to our practice from multiple hospitalizations since July 2020. Shira Chavez was initially admitted and seen by our service July 28, 2020 for a left hallux gangrene/left foot cellulitis and plantar ulcerations.  Patient underwent left superficial femoral, popliteal, tibial, peroneal trunk, and posterior tibial artery atherectomy/balloon angioplasty 7/29/2020.  Patient then underwent left TMA with Achilles tendon lengthening 8/5/2020. Stephanie Moran was discharged 8/7/2022 rehabilitation on doxycycline and Augmentin for 1 week.     8/23/2020 patient returned to [facility] [place] [facility] states that he never received wound care and that he started to feel funny. Stephanie Moran was found to have wound dehiscence with concern for underlying osteomyelitis.  On 8/24/2020 patient underwent incision and drainage of the left foot with revision of TMA of the left foot.  Cultures did grow VRE and Proteus mirabilis as well as a coagulase-negative Staphylococcus.  Patient was discharged on oral ciprofloxacin and linezolid through 9/25/2020 to complete a 4-week course.     Patient now tells me that he has been at home, unable to tell me definitively for how long. Jose Roberto Carvajal has had what home care come out to assess the foot daily and when they came out to see him yesterday they noticed that his foot was draining and instructed him to come to the emergency department. Stephanie Moran is not sure how long his foot has looked like this and he does not feel any pain due to peripheral neuropathy.  He was unaware that he had a wound on the lateral aspect of his foot.  He does not know any fevers or chills.  No generalized body aches.  No nausea, vomiting or diarrhea.  Patient had an x-ray and CT scan of the left foot concerning for residual first metatarsal osteomyelitis.  Patient has been started on vancomycin and Zosyn.  We were consulted due to the osteomyelitis and to assist with antimicrobial therapy management.     Current evaluation:11/10/2020    BP (!) 149/78   Pulse 95   Temp 98.1 °F (36.7 °C)   Resp 18   Ht 6' 0.01\" (1.829 m)   Wt 167 lb 3.2 oz (75.8 kg)   SpO2 94%   BMI 22.67 kg/m²     Temperature Range: Temp: 98.1 °F (36.7 °C) Temp  Av.9 °F (36.6 °C)  Min: 97.5 °F (36.4 °C)  Max: 98.4 °F (36.9 °C)  The patient is seen and evaluated at bedside he is awake and alert in no acute distress. Placement has been an issue due to the cost of the antibiotics. He does not have any current complaints. No nausea vomiting or diarrhea. Review of Systems   Constitutional: Negative. Respiratory: Negative. Cardiovascular: Negative. Gastrointestinal: Negative. Genitourinary: Negative. Musculoskeletal: Negative. Skin: Positive for wound. Neurological: Negative. Physical Examination :     Physical Exam  Constitutional:       Appearance: He is well-developed. HENT:      Head: Normocephalic and atraumatic. Neck:      Musculoskeletal: Normal range of motion and neck supple. Cardiovascular:      Rate and Rhythm: Normal rate. Heart sounds: Normal heart sounds. No friction rub. No gallop. Pulmonary:      Effort: Pulmonary effort is normal.      Breath sounds: Normal breath sounds. No wheezing. Abdominal:      General: Bowel sounds are normal.      Palpations: Abdomen is soft. There is no mass. Tenderness: There is no abdominal tenderness. Comments: Well-healed midline abdominal scar   Musculoskeletal: Normal range of motion. Comments: Right BKA stump intact   Lymphadenopathy:      Cervical: No cervical adenopathy. Skin:     General: Skin is warm and dry. Comments: The patient's left foot does show wounds at the medial aspect of the TMA stump as well as the lateral edge of the TMA stump. There is evidence of deep tissue injury with eschar at the lateral aspect of the foot as well as an area of dry eschar on the dorsal aspect of the foot   Neurological:      Mental Status: He is alert and oriented to person, place, and time.                      Laboratory data:   I have independently reviewed the followinglabs:  CBC with Differential:   Recent Labs 11/08/20  0454   WBC 9.5   HGB 10.4*   HCT 35.4*      LYMPHOPCT 21*   MONOPCT 11     BMP:   Recent Labs     11/08/20  0454 11/09/20  0504     --    K 4.0  --      --    CO2 27  --    BUN 15 21   CREATININE 0.83 0.80   MG 1.7  --      Hepatic Function Panel: No results for input(s): PROT, LABALBU, BILIDIR, IBILI, BILITOT, ALKPHOS, ALT, AST in the last 72 hours. No results found for: PROCAL  Lab Results   Component Value Date    CRP 67.9 11/03/2020    CRP 57.9 09/03/2020    .7 09/01/2020     Lab Results   Component Value Date    SEDRATE 53 (H) 11/03/2020         Lab Results   Component Value Date    DDIMER 0.39 06/29/2020    DDIMER 1.63 12/20/2017    DDIMER 0.68 12/25/2011     Lab Results   Component Value Date    FERRITIN 64 01/25/2014     No results found for: LDH  No results found for: FIBRINOGEN    No results found for requested labs within last 30 days. Lab Results   Component Value Date    COVID19 Not Detected 08/23/2020    COVID19 Not Detected 07/29/2020       No results for input(s): VANCOTROUGH in the last 72 hours. Imaging Studies:   No new imaging    Cultures:     Culture, Blood 1 [[id number]]   Collected: 11/03/20 0630    Order Status: Completed  Specimen: Blood  Updated: 11/09/20 3146     Specimen Description  . BLOOD     Special Requests  10 ML RAC     Culture  NO GROWTH 6 DAYS    Culture, Blood 1 [[id number]]   Collected: 11/03/20 0704    Order Status: Completed  Specimen: Blood  Updated: 11/09/20 9047     Specimen Description  . BLOOD     Special Requests  10ML LAC     Culture  NO GROWTH 6 DAYS    Culture, Anaerobic and Aerobic [[id number]]  (Abnormal)  Collected: 11/03/20 0921    Order Status: Completed  Specimen: Foot  Updated: 11/08/20 7266     Specimen Description  . FOOT LEFT     Special Requests  NOT REPORTED     Direct Exam  FEW NEUTROPHILSAbnormal        NO BACTERIA SEEN     Culture  NORMAL ANNA MARIE      NO ANAEROBIC ORGANISMS ISOLATED AT 5 DAYSAbnormal Medications:      linezolid  600 mg Oral 2 times per day    cefepime  2 g Intravenous Q12H    apixaban  5 mg Oral BID    atorvastatin  40 mg Oral Nightly    dilTIAZem  60 mg Oral 4 times per day    famotidine  20 mg Oral BID    insulin glargine  70 Units Subcutaneous Nightly    insulin lispro  0-12 Units Subcutaneous TID WC    insulin lispro  0-6 Units Subcutaneous Nightly    metFORMIN  500 mg Oral BID WC    nortriptyline  50 mg Oral Nightly    sodium chloride flush  10 mL Intravenous 2 times per day           [facility]  Caroline Brown  [employer] messaging  OFFICE: [phone]      Electronically signed by Caroline Brown MD on 11/10/2020 at 2:33 PM  Thank you for allowing us to participate in the care of this patient. Please call with questions. This note iscreated with the assistance of a speech recognition program.  While intending to generate a document that actually reflects the content of the visit, the document can still have some errors including those of syntax andsound a like substitutions which may escape proof reading. In such instances, actual meaning can be extrapolated by contextual diversion.

## 2020-11-10 NOTE — PROGRESS NOTES
VASCULAR SURGERY  PROGRESS NOTE      11/10/2020 11:05 AM  Subjective:   Admit Date: 11/3/2020  PCP: Nirmal Hill DO    Chief Complaint   Patient presents with    Foot Pain     Interval History: No complaints. Still awaiting ECF precertification. Antibiotics changed to Zyvox. Diet: DIET CARB CONTROL; Dietary Nutrition Supplements: Diabetic Oral Supplement    Medications:   Scheduled Meds:   linezolid  600 mg Oral 2 times per day    cefepime  2 g Intravenous Q12H    apixaban  5 mg Oral BID    atorvastatin  40 mg Oral Nightly    dilTIAZem  60 mg Oral 4 times per day    famotidine  20 mg Oral BID    insulin glargine  70 Units Subcutaneous Nightly    insulin lispro  0-12 Units Subcutaneous TID WC    insulin lispro  0-6 Units Subcutaneous Nightly    metFORMIN  500 mg Oral BID WC    nortriptyline  50 mg Oral Nightly    sodium chloride flush  10 mL Intravenous 2 times per day     Continuous Infusions:   sodium chloride 75 mL/hr at 11/10/20 0417    dextrose           Labs:   CBC:   Recent Labs     11/08/20  0454   WBC 9.5   HGB 10.4*        BMP:    Recent Labs     11/08/20  0454 11/09/20  0504     --    K 4.0  --      --    CO2 27  --    BUN 15 21   CREATININE 0.83 0.80   GLUCOSE 202*  --      Hepatic: No results for input(s): AST, ALT, ALB, BILITOT, ALKPHOS in the last 72 hours. Troponin: Invalid input(s): TROPONIN  BNP: No results for input(s): BNP in the last 72 hours. Lipids: No results for input(s): CHOL, HDL in the last 72 hours. Invalid input(s): LDLCALCU  INR: No results for input(s): INR in the last 72 hours.     Objective:   Vitals: BP (!) 144/74   Pulse 91   Temp 98.4 °F (36.9 °C) (Oral)   Resp 16   Ht 6' 0.01\" (1.829 m)   Wt 167 lb 3.2 oz (75.8 kg)   SpO2 93%   BMI 22.67 kg/m²   General appearance: alert, cooperative and no distress  Mental Status: oriented to person, place and time with normal affect  Neck: good carotid pulses, no JVD  Lungs: clear to auscultation bilaterally, normal effort  Heart: regular rate and rhythm, no murmur,  Abdomen: soft, non-tender, non-distended, bowel sounds present all four quadrants, no masses, hepatomegaly, splenomegaly or aortic enlargement  Extremities: peripheral pulses by Doppler on left, left TMA site mostly healed with small open area with packing. Right BKA site healed  Skin: no gross lesions, rashes, or induration    Assessment:   1. 64 y/o noncompliant diabetic with left TMA site infection/osteo  2.  Adequate lower extremity arterial flow    Patient Active Problem List:     Type 2 diabetes mellitus with diabetic polyneuropathy, with long-term current use of insulin (HCC)     Neuropathic pain, leg     Diabetic polyneuropathy (HCC)     Allergic rhinitis     Tobacco dependence     Edentulous     Dysphagia     Lung nodules     Esophageal cancer (HCC)     Fracture of humerus, left, closed     Closed fracture of humerus     DM type 2 with diabetic peripheral neuropathy (HCC)     COPD without exacerbation (HCC)     Dyslipidemia     Gastroesophageal reflux disease     Chronic pain syndrome     Marijuana use     History of colon polyps     Cellulitis of right heel     Functional diarrhea     Sepsis due to methicillin resistant Staphylococcus aureus (MRSA) (HCC)     History of esophageal cancer     Osteomyelitis, chronic, ankle or foot ([address]     Peripheral artery disease ([address]     Other pulmonary embolism without acute cor pulmonale (HCC)     Carotid stenosis, asymptomatic, bilateral     Lower limb amputation status     Fx humeral neck, right, closed, initial encounter     Transient hypotension     Constipation due to opioid therapy     Acute kidney injury ([address]     Diabetic ulcer of left midfoot associated with type 2 diabetes mellitus, with fat layer exposed ([address]     Complication of below knee amputation stump (HCC)     COPD exacerbation (HCC)     Hyponatremia     Pain, phantom limb ([address]     Below-knee amputation of right lower extremity (HCC)     Hyperglycemia     Moderate protein malnutrition ([address]     Essential hypertension     Uncontrolled type 2 diabetes mellitus with hyperglycemia ([address]     History of pulmonary embolism - 2017     Atelectasis     Uncontrolled type 2 diabetes mellitus with foot ulcer (HCC)     Azotemia     Anemia, normocytic normochromic     Osteomyelitis of fourth phalange of left foot (HCC)     Drug-induced constipation     Osteomyelitis (HCC)     Diabetic foot infection ([address]     Noncompliance     Type 1 diabetes mellitus with diabetic foot infection ([address]     Acute osteomyelitis of left foot (HCC)     Cellulitis of left foot     Mild malnutrition ([address]      Plan:   1. Continue Zyvox  2. Continue local wound care  3.  [place] for discharge to [facility] [place] from a vascular standpoint     Electronically signed by Latonya Wang MD on 11/10/2020 at 11:05 AM

## 2020-11-10 NOTE — DISCHARGE SUMMARY
[facility]  Office: [phone]  Mikey Marques DO, Fern Cedeno DO, Demario Woods DO, Linda Chiu DO, Alejandro Marti MD, Rebecca Li MD, Margy Oakley MD, Sheri Echavarria MD, Katie Quintanilla MD, Cheng Real MD, Verónica Gonzalez MD, Luly Woods MD, Farzaneh Alonzo MD, Rocio Webb DO, Daisy Fiore MD, Franciso Schaumann, MD, Tommy Joseph DO, Marshall Alvarez MD,  Joceline King DO, Francy Dugan MD, Denman Najjar, MD, Mark Pardo, [facility], [facility], CNP, Julian Burnett, CNP, Corazon Young, CNS, Manoj Barry, CNP, Abimael Lynn, CNP, Opal Braun, CNP, Radha Flores, CNP, Nany Marroquin CNP, Mendez Montes PA-C, Mark Gates, ANIYAH, Angel Gunderson, CNP, Boyd Gary, CNP, Sara Berumen, CNP, Marcy Lino, CNP, [place], [place] [id number]    Discharge Summary     Patient ID: Chuckie Schreiber  :  1955   MRN: [id number]     ACCOUNT:  [de-identified]   Patient's PCP: Anselmo Shaw DO  Admit Date: 11/3/2020   Discharge Date: 11/10/2020     Length of Stay: 7  Code Status:  Full Code  Admitting Physician: Francy Dugan MD  Discharge Physician: Denman Najjar, MD     Active Discharge Diagnoses:     Hospital Problem Lists:  Principal Problem:    Acute osteomyelitis of left foot [facility])  Active Problems:    Chronic obstructive pulmonary disease (HCC)    Dyslipidemia    Gastroesophageal reflux disease    Chronic pain syndrome    Essential hypertension    Diabetic foot infection ([facility]    Type 1 diabetes mellitus with diabetic foot infection ([facility]    Cellulitis of left foot    Mild malnutrition ([facility]  Resolved Problems:    COPD (chronic obstructive pulmonary disease) ([facility]    PVD (peripheral vascular disease) ([facility]    Hyperglycemia      Admission Condition:  poor     Discharged Condition: fair    Hospital Stay:     Hospital Course:       This is a 72-year-old male with history of poorly controlled type 1 diabetes, noncompliant with had multiple diabetic foot infections in the past which 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 Patient was encouraged to come to hospital by his visiting nurse who noted the patient seemed to be having infection on the recently amputated foot. Patient was evaluated by podiatry and there were concerns for osteomyelitis. Patient was evaluated by infectious disease, vascular and wound care. He will be discharged to nursing facility with cefepime and Zyvox to complete a course of 6 weeks. PICC line is in place. Patient will need weekly CBC and CMP. He is to follow-up with ID clinic in 3 to 4 weeks.         Significant therapeutic interventions: IV antibiotics    Significant Diagnostic Studies:   Labs / Micro:  CBC:   Lab Results   Component Value Date    WBC 9.5 11/08/2020    RBC 3.92 11/08/2020    RBC 4.32 05/02/2012    HGB 10.4 11/08/2020    HCT 35.4 11/08/2020    MCV 90.3 11/08/2020    MCH 26.5 11/08/2020    MCHC 29.4 11/08/2020    RDW 14.5 11/08/2020     11/08/2020     05/02/2012     BMP:    Lab Results   Component Value Date    GLUCOSE 202 11/08/2020    GLUCOSE 129 05/02/2012     11/08/2020    K 4.0 11/08/2020     11/08/2020    CO2 27 11/08/2020    ANIONGAP 7 11/08/2020    BUN 21 11/09/2020    CREATININE 0.80 11/09/2020    BUNCRER 18 11/08/2020    CALCIUM 9.0 11/08/2020    LABGLOM >60 11/09/2020    GFRAA >60 11/09/2020    GFR      11/09/2020    GFR NOT REPORTED 11/09/2020     HFP:    Lab Results   Component Value Date    PROT 6.7 03/12/2020     CMP:    Lab Results   Component Value Date    GLUCOSE 202 11/08/2020    GLUCOSE 129 05/02/2012     11/08/2020    K 4.0 11/08/2020     11/08/2020    CO2 27 11/08/2020    BUN 21 11/09/2020    CREATININE 0.80 11/09/2020    ANIONGAP 7 11/08/2020    ALKPHOS 127 03/12/2020    ALT 14 03/12/2020    AST 12 03/12/2020    BILITOT <0.10 03/12/2020    LABALBU 3.4 03/12/2020    LABALBU 4.4 03/05/2012    ALBUMIN NOT REPORTED 03/12/2020    LABGLOM >60 11/09/2020    GFRAA >60 11/09/2020    GFR      11/09/2020    GFR NOT REPORTED 11/09/2020    PROT 6.7 03/12/2020    CALCIUM 9.0 11/08/2020     PT/INR:    Lab Results   Component Value Date    PROTIME 13.1 07/29/2020    PROTIME 10.5 05/02/2012    INR 1.0 07/29/2020     PTT:   Lab Results   Component Value Date    APTT 27.3 06/23/2018     FLP:    Lab Results   Component Value Date    CHOL 174 06/24/2018    TRIG 175 06/24/2018    HDL 49 06/24/2018     U/A:    Lab Results   Component Value Date    COLORU YELLOW 07/09/2018    TURBIDITY CLEAR 07/09/2018    SPECGRAV 1.010 07/09/2018    HGBUR NEGATIVE 07/09/2018    PHUR 5.0 07/09/2018    PROTEINU 1+ 07/09/2018    GLUCOSEU NEGATIVE 07/09/2018    GLUCOSEU TRACE 03/05/2012    KETUA NEGATIVE 07/09/2018    BILIRUBINUR NEGATIVE 07/09/2018    BILIRUBINUR small 07/18/2016    BILIRUBINUR NEGATIVE 03/05/2012    UROBILINOGEN Normal 07/09/2018    NITRU NEGATIVE 07/09/2018    LEUKOCYTESUR NEGATIVE 07/09/2018     TSH:    Lab Results   Component Value Date    TSH 0.93 06/24/2018        Radiology:  Ir Insert Picc Vad W Sq Port >5 Years    Result Date: 11/6/2020  Successful ultrasound and fluoroscopy guided PICC placement       Consultations:    Consults:     Final Specialist Recommendations/Findings:   IP CONSULT TO HOSPITALIST  IP CONSULT TO PHARMACY  IP CONSULT TO INFECTIOUS DISEASES  IP CONSULT TO VASCULAR SURGERY      The patient was seen and examined on day of discharge and this discharge summary is in conjunction with any daily progress note from day of discharge.     Discharge plan:     Disposition: Skilled nursing facility    Physician Follow Up:     [address]  [address]  [facility] [address] [place] [address]  [phone]  Schedule an appointment as soon as possible for a visit in 1 week  follow up    [address]  [place] [address] [facility]    Schedule an
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
## 2020-11-10 NOTE — PLAN OF CARE
Problem: Pain:  Goal: Pain level will decrease  Description: Pain level will decrease  11/10/2020 0106 by Luis Eduardo Frances RN  Outcome: Ongoing  11/9/2020 1737 by Faith Cantor RN  Outcome: Ongoing  Note: Pain level assessed and rated on a 0-10 scale  Assess characteristics of pain  PRN pain medication given per pt request  Non-pharmacological interventions implemented  Report ineffective pain management to physician  Update pt and family of any changes  Pt instructed to call out with new onset of pain  Continue to monitor    Goal: Control of acute pain  Description: Control of acute pain  Outcome: Ongoing  Goal: Control of chronic pain  Description: Control of chronic pain  Outcome: Ongoing     Problem: Skin Integrity:  Goal: Will show no infection signs and symptoms  Description: Will show no infection signs and symptoms  Outcome: Ongoing  Goal: Absence of new skin breakdown  Description: Absence of new skin breakdown  Outcome: Ongoing  Goal: Risk for impaired skin integrity will decrease  Description: Risk for impaired skin integrity will decrease  Outcome: Ongoing     Problem: Falls - Risk of:  Goal: Will remain free from falls  Description: Will remain free from falls  11/10/2020 0106 by Luis Eduardo Frances RN  Outcome: Ongoing  11/9/2020 1737 by Faith Cantor RN  Outcome: Ongoing  Note: Room free of clutter  Hourly rounding   Non-skid socks worn  Side rails up x2  Bed low and locked  Call light in reach  Instructed to call out before getting out of bed  Anticipatory needs met  Bed alarm on  Falling star at the door and on wristband    Goal: Absence of physical injury  Description: Absence of physical injury  Outcome: Ongoing     Problem: Nutrition  Goal: Optimal nutrition therapy  Outcome: Ongoing     Problem:  Activity:  Goal: Risk for activity intolerance will decrease  Description: Risk for activity intolerance will decrease  Outcome: Ongoing     Problem: Coping:  Goal: Ability to adjust to condition or change in health will improve  Description: Ability to adjust to condition or change in health will improve  Outcome: Ongoing     Problem: Fluid Volume:  Goal: Ability to maintain a balanced intake and output will improve  Description: Ability to maintain a balanced intake and output will improve  Outcome: Ongoing     Problem: Health Behavior:  Goal: Ability to identify and utilize available resources and services will improve  Description: Ability to identify and utilize available resources and services will improve  Outcome: Ongoing  Goal: Ability to manage health-related needs will improve  Description: Ability to manage health-related needs will improve  Outcome: Ongoing     Problem: Metabolic:  Goal: Ability to maintain appropriate glucose levels will improve  Description: Ability to maintain appropriate glucose levels will improve  Outcome: Ongoing     Problem: Nutritional:  Goal: Maintenance of adequate nutrition will improve  Description: Maintenance of adequate nutrition will improve  Outcome: Ongoing  Goal: Progress toward achieving an optimal weight will improve  Description: Progress toward achieving an optimal weight will improve  Outcome: Ongoing     Problem: Physical Regulation:  Goal: Complications related to the disease process, condition or treatment will be avoided or minimized  Description: Complications related to the disease process, condition or treatment will be avoided or minimized  Outcome: Ongoing  Goal: Diagnostic test results will improve  Description: Diagnostic test results will improve  Outcome: Ongoing     Problem: Tissue Perfusion:  Goal: Adequacy of tissue perfusion will improve  Description: Adequacy of tissue perfusion will improve  Outcome: Ongoing

## 2020-11-10 NOTE — PLAN OF CARE
Problem: Pain:  Goal: Pain level will decrease  Description: Pain level will decrease  Outcome: Ongoing  Goal: Control of acute pain  Description: Control of acute pain  Outcome: Ongoing  Goal: Control of chronic pain  Description: Control of chronic pain  Outcome: Ongoing     Problem: Skin Integrity:  Goal: Will show no infection signs and symptoms  Description: Will show no infection signs and symptoms  Outcome: Ongoing  Goal: Absence of new skin breakdown  Description: Absence of new skin breakdown  Outcome: Ongoing  Goal: Risk for impaired skin integrity will decrease  Description: Risk for impaired skin integrity will decrease  Outcome: Ongoing     Problem: Falls - Risk of:  Goal: Will remain free from falls  Description: Will remain free from falls  Outcome: Ongoing  Goal: Absence of physical injury  Description: Absence of physical injury  Outcome: Ongoing     Problem: Nutrition  Goal: Optimal nutrition therapy  Outcome: Ongoing     Problem:  Activity:  Goal: Risk for activity intolerance will decrease  Description: Risk for activity intolerance will decrease  Outcome: Ongoing     Problem: Coping:  Goal: Ability to adjust to condition or change in health will improve  Description: Ability to adjust to condition or change in health will improve  Outcome: Ongoing     Problem: Fluid Volume:  Goal: Ability to maintain a balanced intake and output will improve  Description: Ability to maintain a balanced intake and output will improve  Outcome: Ongoing     Problem: Health Behavior:  Goal: Ability to identify and utilize available resources and services will improve  Description: Ability to identify and utilize available resources and services will improve  Outcome: Ongoing  Goal: Ability to manage health-related needs will improve  Description: Ability to manage health-related needs will improve  Outcome: Ongoing     Problem: Metabolic:  Goal: Ability to maintain appropriate glucose levels will improve  Description: Ability to maintain appropriate glucose levels will improve  Outcome: Ongoing     Problem: Nutritional:  Goal: Maintenance of adequate nutrition will improve  Description: Maintenance of adequate nutrition will improve  Outcome: Ongoing  Goal: Progress toward achieving an optimal weight will improve  Description: Progress toward achieving an optimal weight will improve  Outcome: Ongoing     Problem: Physical Regulation:  Goal: Complications related to the disease process, condition or treatment will be avoided or minimized  Description: Complications related to the disease process, condition or treatment will be avoided or minimized  Outcome: Ongoing  Goal: Diagnostic test results will improve  Description: Diagnostic test results will improve  Outcome: Ongoing     Problem: Tissue Perfusion:  Goal: Adequacy of tissue perfusion will improve  Description: Adequacy of tissue perfusion will improve  Outcome: Ongoing   Patient is a fall risk during this admission. Fall risk assessment was performed. Patient is absent of falls. Bed is in the lowest position. Wheels on the bed are locked. Call light and bed side table are within reach. Clutter is removed. Patient was educated to call out when needing assistance or wanting to get out of bed. Patient offered toileting assistance during rounding. Hourly rounds have been performed. No new skin breakdown noted throughout the shift.   Will continue to monitor

## 2020-11-11 ENCOUNTER — TELEPHONE (OUTPATIENT)
Dept: PHARMACY | Age: 65
End: 2020-11-11

## 2020-11-11 LAB
BUN BLDV-MCNC: 19 MG/DL (ref 8–23)
CREAT SERPL-MCNC: 0.83 MG/DL (ref 0.7–1.2)
GFR AFRICAN AMERICAN: >60 ML/MIN
GFR NON-AFRICAN AMERICAN: >60 ML/MIN
GFR SERPL CREATININE-BSD FRML MDRD: NORMAL ML/MIN/{1.73_M2}
GFR SERPL CREATININE-BSD FRML MDRD: NORMAL ML/MIN/{1.73_M2}
GLUCOSE BLD-MCNC: 117 MG/DL (ref 75–110)
GLUCOSE BLD-MCNC: 268 MG/DL (ref 75–110)
GLUCOSE BLD-MCNC: 327 MG/DL (ref 75–110)

## 2020-11-11 PROCEDURE — 99231 SBSQ HOSP IP/OBS SF/LOW 25: CPT | Performed by: INTERNAL MEDICINE

## 2020-11-11 PROCEDURE — 82947 ASSAY GLUCOSE BLOOD QUANT: CPT

## 2020-11-11 PROCEDURE — 6360000002 HC RX W HCPCS: Performed by: NURSE PRACTITIONER

## 2020-11-11 PROCEDURE — 6370000000 HC RX 637 (ALT 250 FOR IP): Performed by: NURSE PRACTITIONER

## 2020-11-11 PROCEDURE — 99232 SBSQ HOSP IP/OBS MODERATE 35: CPT | Performed by: NURSE PRACTITIONER

## 2020-11-11 PROCEDURE — 2580000003 HC RX 258: Performed by: NURSE PRACTITIONER

## 2020-11-11 PROCEDURE — 36415 COLL VENOUS BLD VENIPUNCTURE: CPT

## 2020-11-11 PROCEDURE — 82565 ASSAY OF CREATININE: CPT

## 2020-11-11 PROCEDURE — 6370000000 HC RX 637 (ALT 250 FOR IP): Performed by: INTERNAL MEDICINE

## 2020-11-11 PROCEDURE — 84520 ASSAY OF UREA NITROGEN: CPT

## 2020-11-11 PROCEDURE — 1200000000 HC SEMI PRIVATE

## 2020-11-11 RX ADMIN — LORAZEPAM 1 MG: 1 TABLET ORAL at 22:15

## 2020-11-11 RX ADMIN — DILTIAZEM HYDROCHLORIDE 60 MG: 60 TABLET, FILM COATED ORAL at 12:33

## 2020-11-11 RX ADMIN — INSULIN LISPRO 8 UNITS: 100 INJECTION, SOLUTION INTRAVENOUS; SUBCUTANEOUS at 12:33

## 2020-11-11 RX ADMIN — SODIUM CHLORIDE, PRESERVATIVE FREE 10 ML: 5 INJECTION INTRAVENOUS at 21:00

## 2020-11-11 RX ADMIN — METFORMIN HYDROCHLORIDE 500 MG: 500 TABLET ORAL at 17:25

## 2020-11-11 RX ADMIN — DILTIAZEM HYDROCHLORIDE 60 MG: 60 TABLET, FILM COATED ORAL at 17:25

## 2020-11-11 RX ADMIN — OXYCODONE HYDROCHLORIDE AND ACETAMINOPHEN 2 TABLET: 5; 325 TABLET ORAL at 22:15

## 2020-11-11 RX ADMIN — CEFEPIME 2 G: 2 INJECTION, POWDER, FOR SOLUTION INTRAVENOUS at 12:33

## 2020-11-11 RX ADMIN — CEFEPIME 2 G: 2 INJECTION, POWDER, FOR SOLUTION INTRAVENOUS at 00:08

## 2020-11-11 RX ADMIN — INSULIN LISPRO 3 UNITS: 100 INJECTION, SOLUTION INTRAVENOUS; SUBCUTANEOUS at 21:31

## 2020-11-11 RX ADMIN — DILTIAZEM HYDROCHLORIDE 60 MG: 60 TABLET, FILM COATED ORAL at 06:02

## 2020-11-11 RX ADMIN — METFORMIN HYDROCHLORIDE 500 MG: 500 TABLET ORAL at 08:21

## 2020-11-11 RX ADMIN — FAMOTIDINE 20 MG: 20 TABLET, FILM COATED ORAL at 08:21

## 2020-11-11 RX ADMIN — ATORVASTATIN CALCIUM 40 MG: 40 TABLET, FILM COATED ORAL at 21:30

## 2020-11-11 RX ADMIN — INSULIN LISPRO 4 UNITS: 100 INJECTION, SOLUTION INTRAVENOUS; SUBCUTANEOUS at 17:25

## 2020-11-11 RX ADMIN — INSULIN GLARGINE 70 UNITS: 100 INJECTION, SOLUTION SUBCUTANEOUS at 21:30

## 2020-11-11 RX ADMIN — LINEZOLID 600 MG: 600 TABLET, FILM COATED ORAL at 08:21

## 2020-11-11 RX ADMIN — LINEZOLID 600 MG: 600 TABLET, FILM COATED ORAL at 21:30

## 2020-11-11 RX ADMIN — DILTIAZEM HYDROCHLORIDE 60 MG: 60 TABLET, FILM COATED ORAL at 23:44

## 2020-11-11 RX ADMIN — CEFEPIME 2 G: 2 INJECTION, POWDER, FOR SOLUTION INTRAVENOUS at 23:45

## 2020-11-11 RX ADMIN — DILTIAZEM HYDROCHLORIDE 60 MG: 60 TABLET, FILM COATED ORAL at 00:08

## 2020-11-11 RX ADMIN — APIXABAN 5 MG: 5 TABLET, FILM COATED ORAL at 08:21

## 2020-11-11 RX ADMIN — LORAZEPAM 1 MG: 1 TABLET ORAL at 10:32

## 2020-11-11 RX ADMIN — APIXABAN 5 MG: 5 TABLET, FILM COATED ORAL at 21:30

## 2020-11-11 RX ADMIN — NORTRIPTYLINE HYDROCHLORIDE 50 MG: 25 CAPSULE ORAL at 21:30

## 2020-11-11 RX ADMIN — FAMOTIDINE 20 MG: 20 TABLET, FILM COATED ORAL at 21:30

## 2020-11-11 ASSESSMENT — PAIN DESCRIPTION - ONSET: ONSET: ON-GOING

## 2020-11-11 ASSESSMENT — PAIN DESCRIPTION - PROGRESSION
CLINICAL_PROGRESSION: GRADUALLY IMPROVING

## 2020-11-11 ASSESSMENT — PAIN DESCRIPTION - LOCATION: LOCATION: FOOT

## 2020-11-11 ASSESSMENT — PAIN SCALES - GENERAL
PAINLEVEL_OUTOF10: 7
PAINLEVEL_OUTOF10: 7

## 2020-11-11 ASSESSMENT — ENCOUNTER SYMPTOMS
RESPIRATORY NEGATIVE: 1
GASTROINTESTINAL NEGATIVE: 1

## 2020-11-11 ASSESSMENT — PAIN DESCRIPTION - FREQUENCY: FREQUENCY: CONTINUOUS

## 2020-11-11 ASSESSMENT — PAIN DESCRIPTION - PAIN TYPE: TYPE: CHRONIC PAIN

## 2020-11-11 ASSESSMENT — PAIN DESCRIPTION - DESCRIPTORS: DESCRIPTORS: DISCOMFORT;SORE

## 2020-11-11 ASSESSMENT — PAIN DESCRIPTION - ORIENTATION: ORIENTATION: LEFT

## 2020-11-11 ASSESSMENT — PAIN - FUNCTIONAL ASSESSMENT: PAIN_FUNCTIONAL_ASSESSMENT: PREVENTS OR INTERFERES SOME ACTIVE ACTIVITIES AND ADLS
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
## 2020-11-11 NOTE — TELEPHONE ENCOUNTER
Spoke with patient today regarding his status as a \"current everyday smoker\". He reported that he has not used cigarettes in the past 3 weeks. Patient reported that he had quit smoking in the past and was successful about 1 year and then relapsed and smoked for approximately the past 1 year. When asked if the patient was planning on returning to smoking after discharge from the hospital, he denied, however, expressed curiosity in how not smoking would be beneficial to him. Per patient's problem list, it appears as though he has COPD. I informed the patient that the first step he could take in helping control his COPD was to quit smoking and to stay abstinent from tobacco use.  Patient was encouraged to maintain his current status as a former smoker upon discharge from the hospital.

## 2020-11-11 NOTE — PLAN OF CARE
Problem: Pain:  Goal: Pain level will decrease  Description: Pain level will decrease  11/11/2020 0028 by Ashlie Ji RN  Outcome: Ongoing  11/10/2020 1511 by Tanmay Lundberg RN  Outcome: Ongoing  Goal: Control of acute pain  Description: Control of acute pain  11/11/2020 0028 by Ashlie Ji RN  Outcome: Ongoing  11/10/2020 1511 by Tanmay Lundberg RN  Outcome: Ongoing  Goal: Control of chronic pain  Description: Control of chronic pain  11/11/2020 0028 by Ashlie Ji RN  Outcome: Ongoing  11/10/2020 1511 by Tanmay Lundberg RN  Outcome: Ongoing     Problem: Skin Integrity:  Goal: Will show no infection signs and symptoms  Description: Will show no infection signs and symptoms  11/11/2020 0028 by Ashlie Ji RN  Outcome: Ongoing  11/10/2020 1511 by Tanmay Lundberg RN  Outcome: Ongoing  Goal: Absence of new skin breakdown  Description: Absence of new skin breakdown  11/11/2020 0028 by Ashile Ji RN  Outcome: Ongoing  11/10/2020 1511 by Tanmay Lundberg RN  Outcome: Ongoing  Goal: Risk for impaired skin integrity will decrease  Description: Risk for impaired skin integrity will decrease  11/11/2020 0028 by Ashlie Ji RN  Outcome: Ongoing  11/10/2020 1511 by Tanmay Lundberg RN  Outcome: Ongoing     Problem: Falls - Risk of:  Goal: Will remain free from falls  Description: Will remain free from falls  11/11/2020 0028 by Ashlie Ji RN  Outcome: Ongoing  11/10/2020 1511 by Tanmay Lundberg RN  Outcome: Ongoing  Goal: Absence of physical injury  Description: Absence of physical injury  11/11/2020 0028 by Ashlie Ji RN  Outcome: Ongoing  11/10/2020 1511 by Tanmay Lundberg RN  Outcome: Ongoing     Problem: Nutrition  Goal: Optimal nutrition therapy  11/11/2020 0028 by Ashlie Ji RN  Outcome: Ongoing  11/10/2020 1511 by Tanmay Lundberg RN  Outcome: Ongoing     Problem:  Activity:  Goal: Risk for activity intolerance will decrease  Description: Risk for activity intolerance will decrease  11/11/2020 0028 by [facility] Agustin Montoya RN  Outcome: Ongoing  11/10/2020 1511 by Bri Desai RN  Outcome: Ongoing     Problem: Coping:  Goal: Ability to adjust to condition or change in health will improve  Description: Ability to adjust to condition or change in health will improve  11/11/2020 0028 by Viv Santos RN  Outcome: Ongoing  11/10/2020 1511 by Bri Desai RN  Outcome: Ongoing     Problem: Fluid Volume:  Goal: Ability to maintain a balanced intake and output will improve  Description: Ability to maintain a balanced intake and output will improve  11/11/2020 0028 by Viv Santos RN  Outcome: Ongoing  11/10/2020 1511 by Bri Desai RN  Outcome: Ongoing     Problem: Health Behavior:  Goal: Ability to identify and utilize available resources and services will improve  Description: Ability to identify and utilize available resources and services will improve  11/11/2020 0028 by Viv Santos RN  Outcome: Ongoing  11/10/2020 1511 by Bri Desai RN  Outcome: Ongoing  Goal: Ability to manage health-related needs will improve  Description: Ability to manage health-related needs will improve  11/11/2020 0028 by Viv Santos RN  Outcome: Ongoing  11/10/2020 1511 by Bri Desai RN  Outcome: Ongoing     Problem: Metabolic:  Goal: Ability to maintain appropriate glucose levels will improve  Description: Ability to maintain appropriate glucose levels will improve  11/11/2020 0028 by Viv Santos RN  Outcome: Ongoing  11/10/2020 1511 by Bri Desai RN  Outcome: Ongoing     Problem: Nutritional:  Goal: Maintenance of adequate nutrition will improve  Description: Maintenance of adequate nutrition will improve  11/11/2020 0028 by Viv Santos RN  Outcome: Ongoing  11/10/2020 1511 by Bri Desai RN  Outcome: Ongoing  Goal: Progress toward achieving an optimal weight will improve  Description: Progress toward achieving an optimal weight will improve  11/11/2020 0028 by Viv Santos RN  Outcome: Ongoing  11/10/2020 1511 by Mackenzie Leger Shruthi Birch RN  Outcome: Ongoing     Problem: Physical Regulation:  Goal: Complications related to the disease process, condition or treatment will be avoided or minimized  Description: Complications related to the disease process, condition or treatment will be avoided or minimized  11/11/2020 0028 by Leslie Parsons RN  Outcome: Ongoing  11/10/2020 1511 by Sushil Veras RN  Outcome: Ongoing  Goal: Diagnostic test results will improve  Description: Diagnostic test results will improve  11/11/2020 0028 by Leslie Parsons RN  Outcome: Ongoing  11/10/2020 1511 by Sushil Veras RN  Outcome: Ongoing     Problem: Tissue Perfusion:  Goal: Adequacy of tissue perfusion will improve  Description: Adequacy of tissue perfusion will improve  11/11/2020 0028 by Leslie Parsons RN  Outcome: Ongoing  11/10/2020 1511 by Sushil Veras RN  Outcome: Ongoing

## 2020-11-11 NOTE — PLAN OF CARE
Problem: Pain:  Goal: Pain level will decrease  Description: Pain level will decrease  11/11/2020 0851 by Toni Alba RN  Outcome: Ongoing  11/11/2020 0028 by Orscarlet Paul RN  Outcome: Ongoing  Goal: Control of acute pain  Description: Control of acute pain  11/11/2020 0851 by Toni Alba RN  Outcome: Ongoing  11/11/2020 0028 by Orscarlet Paul RN  Outcome: Ongoing  Goal: Control of chronic pain  Description: Control of chronic pain  11/11/2020 0851 by Toni Alba RN  Outcome: Ongoing  11/11/2020 0028 by Orscarlet Paul RN  Outcome: Ongoing     Problem: Skin Integrity:  Goal: Will show no infection signs and symptoms  Description: Will show no infection signs and symptoms  11/11/2020 0851 by Toni Alba RN  Outcome: Ongoing  11/11/2020 0028 by Orscarlet Paul RN  Outcome: Ongoing  Goal: Absence of new skin breakdown  Description: Absence of new skin breakdown  11/11/2020 0851 by Toni Alba RN  Outcome: Ongoing  11/11/2020 0028 by Orscarlet Paul RN  Outcome: Ongoing  Goal: Risk for impaired skin integrity will decrease  Description: Risk for impaired skin integrity will decrease  11/11/2020 0851 by Toni Alba RN  Outcome: Ongoing  11/11/2020 0028 by Orscarlet Paul RN  Outcome: Ongoing     Problem: Falls - Risk of:  Goal: Will remain free from falls  Description: Will remain free from falls  11/11/2020 0851 by Toni Alba RN  Outcome: Ongoing  11/11/2020 0028 by Vangie Paul RN  Outcome: Ongoing  Goal: Absence of physical injury  Description: Absence of physical injury  11/11/2020 0851 by Toni Alba RN  Outcome: Ongoing  11/11/2020 0028 by Vangie Paul RN  Outcome: Ongoing     Problem: Nutrition  Goal: Optimal nutrition therapy  11/11/2020 0851 by Toni Alba RN  Outcome: Ongoing  11/11/2020 0028 by Vangie Paul RN  Outcome: Ongoing     Problem:  Activity:  Goal: Risk for activity intolerance will decrease  Description: Risk for activity intolerance will decrease  11/11/2020 0851 by Marcus Harrell Aneta Appiah RN  Outcome: Ongoing  11/11/2020 0028 by Efren Dhillon RN  Outcome: Ongoing     Problem: Coping:  Goal: Ability to adjust to condition or change in health will improve  Description: Ability to adjust to condition or change in health will improve  11/11/2020 0851 by Salvador Owusu RN  Outcome: Ongoing  11/11/2020 0028 by Efren Dhillon RN  Outcome: Ongoing     Problem: Fluid Volume:  Goal: Ability to maintain a balanced intake and output will improve  Description: Ability to maintain a balanced intake and output will improve  11/11/2020 0851 by Salvador Owusu RN  Outcome: Ongoing  11/11/2020 0028 by Efren Dhillon RN  Outcome: Ongoing     Problem: Health Behavior:  Goal: Ability to identify and utilize available resources and services will improve  Description: Ability to identify and utilize available resources and services will improve  11/11/2020 0851 by Salvador Owusu RN  Outcome: Ongoing  11/11/2020 0028 by Efren Dhillon RN  Outcome: Ongoing  Goal: Ability to manage health-related needs will improve  Description: Ability to manage health-related needs will improve  11/11/2020 0851 by Salvador Owusu RN  Outcome: Ongoing  11/11/2020 0028 by Efren Dhillon RN  Outcome: Ongoing     Problem: Metabolic:  Goal: Ability to maintain appropriate glucose levels will improve  Description: Ability to maintain appropriate glucose levels will improve  11/11/2020 0851 by Salvador Owusu RN  Outcome: Ongoing  11/11/2020 0028 by Efren Dhillon RN  Outcome: Ongoing     Problem: Nutritional:  Goal: Maintenance of adequate nutrition will improve  Description: Maintenance of adequate nutrition will improve  11/11/2020 0851 by Salvador Owusu RN  Outcome: Ongoing  11/11/2020 0028 by Efren Dhillon RN  Outcome: Ongoing  Goal: Progress toward achieving an optimal weight will improve  Description: Progress toward achieving an optimal weight will improve  11/11/2020 0851 by Salvador Owusu RN  Outcome: Ongoing  11/11/2020 0028 by Emily Miller Melissa Argueta RN  Outcome: Ongoing     Problem: Physical Regulation:  Goal: Complications related to the disease process, condition or treatment will be avoided or minimized  Description: Complications related to the disease process, condition or treatment will be avoided or minimized  11/11/2020 0851 by Nehemias Antoine RN  Outcome: Ongoing  11/11/2020 0028 by Luis Eduardo Frances RN  Outcome: Ongoing  Goal: Diagnostic test results will improve  Description: Diagnostic test results will improve  11/11/2020 0851 by Nehemias Antoine RN  Outcome: Ongoing  11/11/2020 0028 by Luis Eduardo Frances RN  Outcome: Ongoing     Problem: Tissue Perfusion:  Goal: Adequacy of tissue perfusion will improve  Description: Adequacy of tissue perfusion will improve  11/11/2020 0851 by Nehemias Antoine RN  Outcome: Ongoing  11/11/2020 0028 by Luis Eduardo Frances RN  Outcome: Ongoing   Patient is a fall risk during this admission. Fall risk assessment was performed. Patient is absent of falls. Bed is in the lowest position. Wheels on the bed are locked. Call light and bed side table are within reach. Clutter is removed. Patient was educated to call out when needing assistance or wanting to get out of bed. Patient offered toileting assistance during rounding. Hourly rounds have been performed. Pt medicated with pain medication prn. Assessed all pain characteristics including level, type, location, frequency, and onset. Non-pharmacologic interventions offered to pt as well. Pt states pain is tolerable at this time.  Will continue to monitor

## 2020-11-11 NOTE — PROGRESS NOTES
[facility]  Progress Note    Jacob Nair  MRN: [id number]  Date: 11/11/2020  LOS: 8     Reason for F/U :   TMA stump infection    Impression :   1. Left transmetatarsal amputationstump infection with underlying osteomyelitis of the first and fifth metatarsals  2. 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 · Current culture with no organisms  · PICC line in place  · Patient will need cefepime and linezolid through December 15, 2020  · Patient will need labs every Monday including CBC, BMP, CPK, and CRP  · Patient can follow-up in ID clinic in 3 to 4 weeks with Dr. Russel Rai    Infection Control Recommendations:   Contact precautions    Discharge Planning:   Estimated Length of IV antimicrobials: December 15, 2020  Patient will need Midline Catheter Insertion/ PICC line Insertion: No  Patient will need: Home IV , [place],  SNF,  LTAC: Undetermined  Patient willneed outpatient wound care: No    Medical Decision making / Summary of Stay:   Jacob Nair is a 63y.o.-year-old male who was initially admitted on 11/3/2020 at the request of his home care nurse due to drainage from his wound. Patient has a known medical history of uncontrolled diabetes mellitus with peripheral neuropathy, peripheral arterial disease, esophageal cancer, COPD, tobacco abuse. 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     Patient is known to our practice from multiple hospitalizations since July 2020. Patient was initially admitted and seen by our service July 28, 2020 for a left hallux gangrene/left foot cellulitis and plantar ulcerations.   Patient underwent left superficial femoral, popliteal, tibial, peroneal trunk, and posterior tibial artery atherectomy/balloon angioplasty 2020. Patient then underwent left TMA with Achilles tendon lengthening 2020. Patient was discharged 2022 rehabilitation on doxycycline and Augmentin for 1 week.     2020 patient returned to [facility].  He states that he never received wound care and that he started to feel funny. Patient was found to have wound dehiscence with concern for underlying osteomyelitis. On 2020 patient underwent incision and drainage of the left foot with revision of TMA of the left foot. Cultures did grow VRE and Proteus mirabilis as well as a coagulase-negative Staphylococcus. Patient was discharged on oral ciprofloxacin and linezolid through 2020 to complete a 4-week course.     Patient now tells me that he has been at home, unable to tell me definitively for how long. He has had what home care come out to assess the foot daily and when they came out to see him yesterday they noticed that his foot was draining and instructed him to come to the emergency department. Patient is not sure how long his foot has looked like this and he does not feel any pain due to peripheral neuropathy. He was unaware that he had a wound on the lateral aspect of his foot. He does not know any fevers or chills. No generalized body aches. No nausea, vomiting or diarrhea. Patient had an x-ray and CT scan of the left foot concerning for residual first metatarsal osteomyelitis. Patient has been started on vancomycin and Zosyn. We were consulted due to the osteomyelitis and to assist with antimicrobial therapy management.     Current evaluation:2020    BP (!) 152/77   Pulse 95   Temp 97.7 °F (36.5 °C) (Oral)   Resp 16   Ht 6' 0.01\" (1.829 m)   Wt 165 lb 9.6 oz (75.1 kg)   SpO2 96%   BMI 22.45 kg/m²     Temperature Range: Temp: 97.7 °F (36.5 °C) Temp  Av.7 °F (36.5 °C)  Min: 97.5 °F (36.4 °C)  Max: 98.1 °F (36.7 °C)    Patient seen and evaluated sitting up in bed. He complains that he does not feel well today because he is not getting any sleep. He states that he was nauseous this morning and threw up but that has resolved. Review of Systems   Constitutional:        \"I'm sick\"    HENT: Negative. Respiratory: Negative. Cardiovascular: Negative. Gastrointestinal: Negative. Genitourinary: Negative. Nausea with emesis this morning but resolved now   Musculoskeletal: Negative. Skin: Negative. Psychiatric/Behavioral: Negative. Physical Examination :     Physical Exam  Constitutional:       General: He is not in acute distress. Appearance: Normal appearance. He is normal weight. HENT:      Head: Normocephalic and atraumatic. Pulmonary:      Effort: Pulmonary effort is normal. No respiratory distress. Abdominal:      General: There is no distension. Musculoskeletal:      Comments: Left lower extremity with dressing in place, not removed today. Right BKA stump   Skin:     General: Skin is warm and dry. Neurological:      General: No focal deficit present. Mental Status: He is alert. Mental status is at baseline. Psychiatric:         Mood and Affect: Mood normal.         Behavior: Behavior normal.                       Laboratory data:   I have independently reviewed the followinglabs:  CBC with Differential:   No results for input(s): WBC, HGB, HCT, PLT, SEGSPCT, BANDSPCT, LYMPHOPCT, MONOPCT, EOSPCT in the last 72 hours. BMP:   Recent Labs     11/09/20  0504 11/11/20  0539   BUN 21 19   CREATININE 0.80 0.83     Hepatic Function Panel: No results for input(s): PROT, LABALBU, BILIDIR, IBILI, BILITOT, ALKPHOS, ALT, AST in the last 72 hours.       No results found for: PROCAL  Lab Results   Component Value Date    CRP 67.9 11/03/2020    CRP 57.9 09/03/2020    .7 09/01/2020     Lab Results   Component Value Date    SEDRATE 53 (H) 11/03/2020         Lab Results   Component Value Date    DDIMER 0.39 06/29/2020 DDIMER 1.63 12/20/2017    DDIMER 0.68 12/25/2011     Lab Results   Component Value Date    FERRITIN 64 01/25/2014     No results found for: LDH  No results found for: FIBRINOGEN    Results in Past 30 Days  Result Component Current Result Ref Range Previous Result Ref Range   SARS-CoV-2      (11/10/2020)  Not in Time Range          (11/10/2020)       Not Detected (11/10/2020) Not Detected       Lab Results   Component Value Date    COVID19 Not Detected 11/10/2020    COVID19 Not Detected 08/23/2020    COVID19 Not Detected 07/29/2020       No results for input(s): VANCOTROUGH in the last 72 hours. Imaging Studies:     No new imaging    Cultures:     Culture, Blood 1 [[id number]]   Collected: 11/03/20 0630    Order Status: Completed  Specimen: Blood  Updated: 11/09/20 2848     Specimen Description  . BLOOD     Special Requests  10 ML RAC     Culture  NO GROWTH 6 DAYS    Culture, Blood 1 [[id number]]   Collected: 11/03/20 0704    Order Status: Completed  Specimen: Blood  Updated: 11/09/20 8791     Specimen Description  . BLOOD     Special Requests  10ML LAC     Culture  NO GROWTH 6 DAYS    Culture, Anaerobic and Aerobic [[id number]]  (Abnormal)  Collected: 11/03/20 0921    Order Status: Completed  Specimen: Foot  Updated: 11/08/20 1656     Specimen Description  . FOOT LEFT     Special Requests  NOT REPORTED     Direct Exam  FEW NEUTROPHILSAbnormal        NO BACTERIA SEEN     Culture  NORMAL ANNA MARIE      NO ANAEROBIC ORGANISMS ISOLATED AT 5 DAYSAbnormal         Medications:      linezolid  600 mg Oral 2 times per day    cefepime  2 g Intravenous Q12H    apixaban  5 mg Oral BID    atorvastatin  40 mg Oral Nightly    dilTIAZem  60 mg Oral 4 times per day    famotidine  20 mg Oral BID    insulin glargine  70 Units Subcutaneous Nightly    insulin lispro  0-12 Units Subcutaneous TID WC    insulin lispro  0-6 Units Subcutaneous Nightly    metFORMIN  500 mg Oral BID WC    nortriptyline  50 mg Oral Nightly    sodium chloride flush  10 mL Intravenous 2 times per day           [facility]  [phone] [address]  OFFICE: [phone]      Electronically signed by MARCELO Ivey CNP on 11/11/2020 at 8:06 AM  Thank you for allowing us to participate in the care of this patient. Please call with questions. This note iscreated with the assistance of a speech recognition program.  While intending to generate a document that actually reflects the content of the visit, the document can still have some errors including those of syntax andsound a like substitutions which may escape proof reading. In such instances, actual meaning can be extrapolated by contextual diversion.

## 2020-11-11 NOTE — PROGRESS NOTES
Occupational Therapy  DATE: 2020    NAME: Stanton Dhillon  MRN: [id number]   : 1955  [facility]  Occupational Therapy Not Seen Note    Patient not available for Occupational Therapy due to:    [] Testing:    [] Hemodialysis    [] Cancelled by RN:    [x]Refusal by Patient: Sarahlaloretta Neighbor on patient 2x this am, refusing d/t not having enough sleep.      [] Surgery:     [] Intubation:     [] Pain Medication:    [] Sedation:     [] Spine Precautions :    [] Medical Instability:    [] Other:        ARABELLA Chapa

## 2020-11-11 NOTE — PROGRESS NOTES
VASCULAR SURGERY  PROGRESS NOTE      11/11/2020 3:14 PM  Subjective:   Admit Date: 11/3/2020  PCP: Maddie Birimngham DO    Chief Complaint   Patient presents with    Foot Pain     Interval History: No complaints. Still awaiting ECF precertification. Diet: DIET CARB CONTROL; Dietary Nutrition Supplements: Diabetic Oral Supplement    Medications:   Scheduled Meds:   linezolid  600 mg Oral 2 times per day    cefepime  2 g Intravenous Q12H    apixaban  5 mg Oral BID    atorvastatin  40 mg Oral Nightly    dilTIAZem  60 mg Oral 4 times per day    famotidine  20 mg Oral BID    insulin glargine  70 Units Subcutaneous Nightly    insulin lispro  0-12 Units Subcutaneous TID WC    insulin lispro  0-6 Units Subcutaneous Nightly    metFORMIN  500 mg Oral BID WC    nortriptyline  50 mg Oral Nightly    sodium chloride flush  10 mL Intravenous 2 times per day     Continuous Infusions:   dextrose           Labs:   CBC:   No results for input(s): WBC, HGB, PLT in the last 72 hours. BMP:    Recent Labs     11/09/20  0504 11/11/20  0539   BUN 21 19   CREATININE 0.80 0.83     Hepatic: No results for input(s): AST, ALT, ALB, BILITOT, ALKPHOS in the last 72 hours. Troponin: Invalid input(s): TROPONIN  BNP: No results for input(s): BNP in the last 72 hours. Lipids: No results for input(s): CHOL, HDL in the last 72 hours. Invalid input(s): LDLCALCU  INR: No results for input(s): INR in the last 72 hours.     Objective:   Vitals: BP (!) 147/76   Pulse 90   Temp 98.3 °F (36.8 °C) (Oral)   Resp 18   Ht 6' 0.01\" (1.829 m)   Wt 165 lb 9.6 oz (75.1 kg)   SpO2 95%   BMI 22.45 kg/m²   General appearance: alert, cooperative and no distress  Mental Status: oriented to person, place and time with normal affect  Neck: good carotid pulses, no JVD  Lungs: clear to auscultation bilaterally, normal effort  Heart: regular rate and rhythm, no murmur,  Abdomen: soft, non-tender, non-distended, bowel sounds present all four hyperglycemia ([address]     History of pulmonary embolism - 2017     Atelectasis     Uncontrolled type 2 diabetes mellitus with foot ulcer (HCC)     Azotemia     Anemia, normocytic normochromic     Osteomyelitis of fourth phalange of left foot (HCC)     Drug-induced constipation     Osteomyelitis (HCC)     Diabetic foot infection ([address]     Noncompliance     Type 1 diabetes mellitus with diabetic foot infection ([address]     Acute osteomyelitis of left foot (HCC)     Cellulitis of left foot     Mild malnutrition ([address]      Plan:   1. Continue Zyvox  2. Continue local wound care  3.  [place] for discharge to [facility] from a vascular standpoint     Electronically signed by Latonya Wang MD on 11/11/2020 at 3:14 PM

## 2020-11-12 ENCOUNTER — CARE COORDINATION (OUTPATIENT)
Dept: CARE COORDINATION | Age: 65
End: 2020-11-12

## 2020-11-12 VITALS
RESPIRATION RATE: 18 BRPM | SYSTOLIC BLOOD PRESSURE: 147 MMHG | DIASTOLIC BLOOD PRESSURE: 78 MMHG | TEMPERATURE: 97.5 F | HEIGHT: 72 IN | HEART RATE: 86 BPM | WEIGHT: 162.2 LBS | BODY MASS INDEX: 21.97 KG/M2 | OXYGEN SATURATION: 95 %

## 2020-11-12 LAB
GLUCOSE BLD-MCNC: 149 MG/DL (ref 75–110)
GLUCOSE BLD-MCNC: 165 MG/DL (ref 75–110)
GLUCOSE BLD-MCNC: 206 MG/DL (ref 75–110)
GLUCOSE BLD-MCNC: 240 MG/DL (ref 75–110)

## 2020-11-12 PROCEDURE — 97530 THERAPEUTIC ACTIVITIES: CPT

## 2020-11-12 PROCEDURE — 2580000003 HC RX 258: Performed by: NURSE PRACTITIONER

## 2020-11-12 PROCEDURE — 6360000002 HC RX W HCPCS: Performed by: NURSE PRACTITIONER

## 2020-11-12 PROCEDURE — APPSS45 APP SPLIT SHARED TIME 31-45 MINUTES: Performed by: NURSE PRACTITIONER

## 2020-11-12 PROCEDURE — 6370000000 HC RX 637 (ALT 250 FOR IP): Performed by: INTERNAL MEDICINE

## 2020-11-12 PROCEDURE — 6370000000 HC RX 637 (ALT 250 FOR IP): Performed by: NURSE PRACTITIONER

## 2020-11-12 PROCEDURE — 99232 SBSQ HOSP IP/OBS MODERATE 35: CPT | Performed by: NURSE PRACTITIONER

## 2020-11-12 PROCEDURE — 82947 ASSAY GLUCOSE BLOOD QUANT: CPT

## 2020-11-12 RX ORDER — OXYCODONE HYDROCHLORIDE AND ACETAMINOPHEN 5; 325 MG/1; MG/1
2 TABLET ORAL EVERY 4 HOURS PRN
Qty: 24 TABLET | Refills: 0 | Status: SHIPPED | OUTPATIENT
Start: 2020-11-12 | End: 2020-11-15

## 2020-11-12 RX ADMIN — LINEZOLID 600 MG: 600 TABLET, FILM COATED ORAL at 08:08

## 2020-11-12 RX ADMIN — ANTACID TABLETS 1000 MG: 500 TABLET, CHEWABLE ORAL at 16:33

## 2020-11-12 RX ADMIN — DILTIAZEM HYDROCHLORIDE 60 MG: 60 TABLET, FILM COATED ORAL at 11:38

## 2020-11-12 RX ADMIN — INSULIN LISPRO 2 UNITS: 100 INJECTION, SOLUTION INTRAVENOUS; SUBCUTANEOUS at 08:07

## 2020-11-12 RX ADMIN — INSULIN LISPRO 4 UNITS: 100 INJECTION, SOLUTION INTRAVENOUS; SUBCUTANEOUS at 12:11

## 2020-11-12 RX ADMIN — DILTIAZEM HYDROCHLORIDE 60 MG: 60 TABLET, FILM COATED ORAL at 17:00

## 2020-11-12 RX ADMIN — METFORMIN HYDROCHLORIDE 500 MG: 500 TABLET ORAL at 08:08

## 2020-11-12 RX ADMIN — APIXABAN 5 MG: 5 TABLET, FILM COATED ORAL at 08:08

## 2020-11-12 RX ADMIN — SODIUM CHLORIDE, PRESERVATIVE FREE 10 ML: 5 INJECTION INTRAVENOUS at 11:39

## 2020-11-12 RX ADMIN — OXYCODONE HYDROCHLORIDE AND ACETAMINOPHEN 2 TABLET: 5; 325 TABLET ORAL at 12:12

## 2020-11-12 RX ADMIN — FAMOTIDINE 20 MG: 20 TABLET, FILM COATED ORAL at 08:08

## 2020-11-12 RX ADMIN — METFORMIN HYDROCHLORIDE 500 MG: 500 TABLET ORAL at 17:00

## 2020-11-12 RX ADMIN — DILTIAZEM HYDROCHLORIDE 60 MG: 60 TABLET, FILM COATED ORAL at 05:58

## 2020-11-12 RX ADMIN — CEFEPIME 2 G: 2 INJECTION, POWDER, FOR SOLUTION INTRAVENOUS at 11:38

## 2020-11-12 RX ADMIN — OXYCODONE HYDROCHLORIDE AND ACETAMINOPHEN 2 TABLET: 5; 325 TABLET ORAL at 05:59

## 2020-11-12 ASSESSMENT — PAIN DESCRIPTION - PROGRESSION
CLINICAL_PROGRESSION: GRADUALLY IMPROVING

## 2020-11-12 ASSESSMENT — PAIN SCALES - GENERAL
PAINLEVEL_OUTOF10: 8
PAINLEVEL_OUTOF10: 7

## 2020-11-12 NOTE — PROGRESS NOTES
Report given to nurse at [facility] , all questions answered.

## 2020-11-12 NOTE — PROGRESS NOTES
Precautions, Fall Risk, Up as Tolerated  Required Braces or Orthoses?: No  Lower Extremity Weight Bearing Restrictions  Partial Weight Bearing Percentage Or Pounds: HWB  Position Activity Restriction  Other position/activity restrictions: IV, Right BKA with prosthesis, Left TMA  Subjective   General  Chart Reviewed: Yes  Patient assessed for rehabilitation services?: Yes  Response to previous treatment: Patient with no complaints from previous session  Family / Caregiver Present: No  Referring Practitioner: angeline  Diagnosis: Diabetic foot infection      Orientation  Orientation  Overall Orientation Status: Within Normal Limits  Objective    ADL  UE Dressing: Modified independent   LE Dressing: Modified independent (to don prothesis)  Additional Comments: Pt was able to she R LE prosthesis with set up seated EOB. Balance  Sitting Balance: Independent  Standing Balance: Stand by assistance(patient will not allow CGA)  Functional Mobility  Functional - Mobility Device: No device  Activity: Other  Assist Level: Contact guard assistance  Functional Mobility Comments: Pt completed functional mobility around room without use of AD. Pt very impulsive and unsafe, would benefit from using walker but adamantly refuses. Pt appears to be putting full weight on L foot. Pt is unsteady and is a high fall risk. Not receptive to safety cues. Bed mobility  Supine to Sit: Modified independent  Scooting: Independent  Transfers  Sit to stand: Stand by assistance  Stand to sit: Stand by assistance  Transfer Comments: Verbal cues for safety and pacing with poor follow thru. Patient does not allow use of gait belt or writer to be near patient      Cognition  Overall Cognitive Status: Exceptions  Arousal/Alertness: Appropriate responses to stimuli  Following Commands:  Follows multistep commands consistently  Attention Span: Appears intact  Memory: Appears intact  Safety Judgement: Decreased awareness of need for safety;Decreased awareness of need for assistance  Problem Solving: Decreased awareness of errors;Assistance required to identify errors made;Assistance required to correct errors made;Assistance required to implement solutions;Assistance required to generate solutions  Insights: Decreased awareness of deficits  Initiation: Requires cues for some  Sequencing: Does not require cues  Cognition Comment: Patient with poor insight into importance of maintaining WB status on LLE      Plan   Plan  Times per week: 4-5x/week  Times per day: Daily  Current Treatment Recommendations: Balance Training, Functional Mobility Training, Endurance Training, Safety Education & Training, Patient/Caregiver Education & Training, Equipment Evaluation, Education, & procurement, Self-Care / ADL  AM-PAC Score        AM-PAC Inpatient Daily Activity Raw Score: 21 (11/12/20 1248)  AM-PAC Inpatient ADL T-Scale Score : 44.27 (11/12/20 1248)  ADL Inpatient CMS 0-100% Score: 32.79 (11/12/20 1248)  ADL Inpatient CMS G-Code Modifier : Nataly Quinones (11/12/20 1248)    Goals  Short term goals  Time Frame for Short term goals: 10 sessions  Short term goal 1: Pt will demo mod I and safety with transfers and functional mobility  Short term goal 2: Pt will demo mod I and safety with ADLs  Short term goal 3: Pt will demo G understanding and follow through of WB status, safety awareness, and EC  Short term goal 4: Pt will tolerate administration of SLUMS  Short term goal 5: Pt will increase activity tolerance to engage in at least 20-30 minutes of dynamic seated/standing activity with no more than min rest breaks PRN        Therapy Time   Individual Concurrent Group Co-treatment   Time In 1032         Time Out 1051         Minutes 19           Upon writer exit, call light within reach, pt retired to chair. All lines intact and patient positioned comfortably. Chair alarm in place. All patient needs addressed prior to ending therapy session.  Chart reviewed prior to treatment and patient is agreeable for therapy. RN reports patient is medically stable for therapy treatment this date.       JONATHAN Regalado/MARIN

## 2020-11-12 NOTE — PLAN OF CARE
Problem: Pain:  Goal: Pain level will decrease  Description: Pain level will decrease  Outcome: Ongoing     Problem: Pain:  Goal: Control of acute pain  Description: Control of acute pain  Outcome: Ongoing     Problem: Pain:  Goal: Control of chronic pain  Description: Control of chronic pain  Outcome: Ongoing     Problem: Skin Integrity:  Goal: Will show no infection signs and symptoms  Description: Will show no infection signs and symptoms  Outcome: Ongoing     Problem: Skin Integrity:  Goal: Absence of new skin breakdown  Description: Absence of new skin breakdown  Outcome: Ongoing     Problem: Skin Integrity:  Goal: Risk for impaired skin integrity will decrease  Description: Risk for impaired skin integrity will decrease  Outcome: Ongoing     Problem: Falls - Risk of:  Goal: Will remain free from falls  Description: Will remain free from falls  Outcome: Ongoing     Problem: Falls - Risk of:  Goal: Absence of physical injury  Description: Absence of physical injury  Outcome: Ongoing     Problem: Nutrition  Goal: Optimal nutrition therapy  Outcome: Ongoing     Problem:  Activity:  Goal: Risk for activity intolerance will decrease  Description: Risk for activity intolerance will decrease  Outcome: Ongoing     Problem: Coping:  Goal: Ability to adjust to condition or change in health will improve  Description: Ability to adjust to condition or change in health will improve  Outcome: Ongoing     Problem: Fluid Volume:  Goal: Ability to maintain a balanced intake and output will improve  Description: Ability to maintain a balanced intake and output will improve  Outcome: Ongoing     Problem: Health Behavior:  Goal: Ability to identify and utilize available resources and services will improve  Description: Ability to identify and utilize available resources and services will improve  Outcome: Ongoing     Problem: Health Behavior:  Goal: Ability to manage health-related needs will improve  Description: Ability to manage health-related needs will improve  Outcome: Ongoing     Problem: Metabolic:  Goal: Ability to maintain appropriate glucose levels will improve  Description: Ability to maintain appropriate glucose levels will improve  Outcome: Ongoing     Problem: Nutritional:  Goal: Maintenance of adequate nutrition will improve  Description: Maintenance of adequate nutrition will improve  Outcome: Ongoing     Problem: Nutritional:  Goal: Progress toward achieving an optimal weight will improve  Description: Progress toward achieving an optimal weight will improve  Outcome: Ongoing     Problem: Physical Regulation:  Goal: Complications related to the disease process, condition or treatment will be avoided or minimized  Description: Complications related to the disease process, condition or treatment will be avoided or minimized  Outcome: Ongoing     Problem: Physical Regulation:  Goal: Diagnostic test results will improve  Description: Diagnostic test results will improve  Outcome: Ongoing     Problem: Tissue Perfusion:  Goal: Adequacy of tissue perfusion will improve  Description: Adequacy of tissue perfusion will improve  Outcome: Ongoing

## 2020-11-12 NOTE — DISCHARGE SUMMARY
[facility]  Office: [phone]  Marli Johnny, DO, Renato Haile, DO, Veronique Flanagan, DO, Pravin Chiu DO, Yandy Mcfarland MD, Serina Starr MD, Destiny Ocasio MD, Brea Willingham MD, Tu Noel MD, Denny Morataya MD, Gosia Metz MD, Josey Velasquez MD, Farzaneh Nobles MD, Vernon Purvis DO, Abby Miller MD, Reji Valenzuela MD, Oren Irving DO, Jourdan Stahl MD,  Jarek Murillo DO, Magalie Torres MD, Ayad Lund MD, Loc Watkins, [facility], [facility], CNP, Iban Welsh, CNP, Yang Molina, CNS, Filomena Wallace, CNP, Ying Collins, CNP, Rosanne Curran, CNP, Ahsley Doan, CNP, Penelope Hoang, CNP, Nydia Oneill PA-C, Eric Casas, [facility], Rafael Conner, CNP, Brenda Boo, CNP, Marcos Monk, CNP, Sara Selby, CNP, Stefan Ruvalcaba, CNP         [facility] 97    Discharge Summary     Patient ID: Roel Copeland  :  1955   MRN: [id number]     ACCOUNT:  [de-identified]   Patient's PCP: Nirmal Hill DO  Admit Date: 11/3/2020   Discharge Date: 2020     Length of Stay: 9  Code Status:  Full Code  Admitting Physician: Magalie Torres MD  Discharge Physician: MARCELO Hendrickson NP     Active Discharge Diagnoses:     Hospital Problem Lists:  Principal Problem:    Acute osteomyelitis of left foot [facility])  Active Problems:    Chronic obstructive pulmonary disease (HCC)    Dyslipidemia    Gastroesophageal reflux disease    Chronic pain syndrome    Essential hypertension    Diabetic foot infection ([facility]    Type 1 diabetes mellitus with diabetic foot infection ([facility]    Cellulitis of left foot    Mild malnutrition ([facility]  Resolved Problems:    COPD (chronic obstructive pulmonary disease) ([facility]    PVD (peripheral vascular disease) ([facility]    Hyperglycemia      Admission Condition:  poor     Discharged Condition: fair    Hospital Stay:     Hospital Course:       This is a 62-year-old male with history of poorly controlled type 1 diabetes, noncompliant with had multiple diabetic foot infections in the past which 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 Patient was encouraged to come to hospital by his visiting nurse who noted the patient seemed to be having infection on the recently amputated foot. Patient was evaluated by podiatry and there were concerns for osteomyelitis. Patient was evaluated by infectious disease, vascular and wound care. He will be discharged to nursing facility with cefepime and Zyvox to complete a course of 6 weeks. PICC line is in place. Patient will need weekly CBC and CMP. He is to follow-up with ID clinic in 3 to 4 weeks.         Significant therapeutic interventions: IV antibiotics    Significant Diagnostic Studies:   Labs / Micro:  CBC:   Lab Results   Component Value Date    WBC 9.5 11/08/2020    RBC 3.92 11/08/2020    RBC 4.32 05/02/2012    HGB 10.4 11/08/2020    HCT 35.4 11/08/2020    MCV 90.3 11/08/2020    MCH 26.5 11/08/2020    MCHC 29.4 11/08/2020    RDW 14.5 11/08/2020     11/08/2020     05/02/2012     BMP:    Lab Results   Component Value Date    GLUCOSE 202 11/08/2020    GLUCOSE 129 05/02/2012     11/08/2020    K 4.0 11/08/2020     11/08/2020    CO2 27 11/08/2020    ANIONGAP 7 11/08/2020    BUN 19 11/11/2020    CREATININE 0.83 11/11/2020    BUNCRER 18 11/08/2020    CALCIUM 9.0 11/08/2020    LABGLOM >60 11/11/2020    GFRAA >60 11/11/2020    GFR      11/11/2020    GFR NOT REPORTED 11/11/2020     HFP:    Lab Results   Component Value Date    PROT 6.7 03/12/2020     CMP:    Lab Results   Component Value Date    GLUCOSE 202 11/08/2020    GLUCOSE 129 05/02/2012     11/08/2020    K 4.0 11/08/2020     11/08/2020    CO2 27 11/08/2020    BUN 19 11/11/2020    CREATININE 0.83 11/11/2020    ANIONGAP 7 11/08/2020    ALKPHOS 127 03/12/2020    ALT 14 03/12/2020    AST 12 03/12/2020    BILITOT <0.10 03/12/2020    LABALBU 3.4 03/12/2020    LABALBU 4.4 03/05/2012    ALBUMIN NOT REPORTED 03/12/2020    LABGLOM >60 11/11/2020    GFRAA >60 11/11/2020    GFR      11/11/2020    GFR NOT REPORTED 11/11/2020    PROT 6.7 03/12/2020    CALCIUM 9.0 11/08/2020     PT/INR:    Lab Results   Component Value Date    PROTIME 13.1 07/29/2020    PROTIME 10.5 05/02/2012    INR 1.0 07/29/2020     PTT:   Lab Results   Component Value Date    APTT 27.3 06/23/2018     FLP:    Lab Results   Component Value Date    CHOL 174 06/24/2018    TRIG 175 06/24/2018    HDL 49 06/24/2018     U/A:    Lab Results   Component Value Date    COLORU YELLOW 07/09/2018    TURBIDITY CLEAR 07/09/2018    SPECGRAV 1.010 07/09/2018    HGBUR NEGATIVE 07/09/2018    PHUR 5.0 07/09/2018    PROTEINU 1+ 07/09/2018    GLUCOSEU NEGATIVE 07/09/2018    GLUCOSEU TRACE 03/05/2012    KETUA NEGATIVE 07/09/2018    BILIRUBINUR NEGATIVE 07/09/2018    BILIRUBINUR small 07/18/2016    BILIRUBINUR NEGATIVE 03/05/2012    UROBILINOGEN Normal 07/09/2018    NITRU NEGATIVE 07/09/2018    LEUKOCYTESUR NEGATIVE 07/09/2018     TSH:    Lab Results   Component Value Date    TSH 0.93 06/24/2018        Radiology:  Ir Insert Picc Vad W Sq Port >5 Years    Result Date: 11/6/2020  Successful ultrasound and fluoroscopy guided PICC placement       Consultations:    Consults:     Final Specialist Recommendations/Findings:   IP CONSULT TO HOSPITALIST  IP CONSULT TO PHARMACY  IP CONSULT TO INFECTIOUS DISEASES  IP CONSULT TO VASCULAR SURGERY      The patient was seen and examined on day of discharge and this discharge summary is in conjunction with any daily progress note from day of discharge.     Discharge plan:     Disposition: Skilled nursing facility    Physician Follow Up:     [address]  [address]  [address] [facility] [address] [facility]  [phone]  Schedule an appointment as soon as possible for a visit in 1 week  follow up    [address]  [place] [address] [facility]    Schedule an tablet  Commonly known as:  CARDIZEM  TAKE 1 TABLET BY MOUTH FOUR TIMES DAILY     docusate 100 MG Caps  Commonly known as:  COLACE, DULCOLAX  Take 100 mg by mouth 2 times daily as needed (constipation)     famotidine 20 MG tablet  Commonly known as:  PEPCID  TAKE 1 TABLET BY MOUTH TWICE DAILY     glucose monitoring kit monitoring kit  1 kit by Does not apply route daily     insulin lispro (1 Unit Dial) 100 UNIT/ML Sopn  Commonly known as:  Admelog SoloStar  Inject 10 Units into the skin 3 times daily     Insulin Pen Needle 32G X 4 MM Misc  1 each by Does not apply route daily     ipratropium-albuterol 0.5-2.5 (3) MG/3ML Soln nebulizer solution  Commonly known as:  DUONEB  Inhale 3 mLs into the lungs every 4 hours (while awake)     metFORMIN 500 MG tablet  Commonly known as:  GLUCOPHAGE  Take 1 tablet by mouth 2 times daily (with meals)     naloxone 4 MG/0.1ML Liqd nasal spray  1 spray by Nasal route as needed for Opioid Reversal     nicotine 21 MG/24HR  Commonly known as:  NICODERM CQ  Place 1 patch onto the skin daily as needed (if pateint smokes)     nortriptyline 25 MG capsule  Commonly known as:  PAMELOR     Tresiba FlexTouch 100 UNIT/ML Sopn  Generic drug:  Insulin Degludec     * TRUEplus Lancets 30G Misc  Inject 1 each into the skin 3 times daily TO CHECK FLUCTUATING BLOOD SUGARS IE HYPERGLYCEMIA     * Lancets Misc  1 each by Does not apply route 2 times daily     Ventolin  (90 Base) MCG/ACT inhaler  Generic drug:  albuterol sulfate HFA         * This list has 2 medication(s) that are the same as other medications prescribed for you. Read the directions carefully, and ask your doctor or other care provider to review them with you.             STOP taking these medications    Xarelto 10 MG Tabs tablet  Generic drug:  rivaroxaban           Where to Get Your Medications      [facility] [address], [address] [place] [address],Second Floor - F [phone] [address], [address] [facility] [address]    Phone:  [phone]   · linezolid 600 MG tablet     You can get these medications from any pharmacy    Bring a paper prescription for each of these medications  · cefepime  infusion  · oxyCODONE-acetaminophen 5-325 MG per tablet         No discharge procedures on file. Time Spent on discharge is  40 mins in patient examination, evaluation, counseling as well as medication reconciliation, prescriptions for required medications, discharge plan and follow up. Electronically signed by   MARCLEO Boo NP  11/12/2020  5:36 PM      Thank you Dr. Denice Arora DO for the opportunity to be involved in this patient's care.

## 2020-11-12 NOTE — CARE COORDINATION
[address] [place] [address] NP-spoke with pt. And he agrees to ECF short term. Discussed with writer and LSW.
Anna work: worked to focus pt on snf decision. He is ok with [facility] if they are in network. Will fax referrals. Will need a precert. Will need philip and Rx at discharge. lsw to do hens.  Patience Michael camacho
DC Planning      Spoke with pt. He is agreeable to ecf-will need long term abx PICC inserted today. He does not want to go to [facility] or any [facility] facility. He has not worked with PT since 11/4 and has not worked with OT. Explained to him he needs to work with therapy-he agrees he states \"I was sick as hell\" but will try tomorrow. Updated LSW.
DC planning    Spoke with pt. Plan  For [facility] [place] [facility]. Covid negative. Precert pending,  LOREE signed.
Social work: Met with patient at bedside and confirmed he is agreeable to [facility] for rehab. Authorization pending at this time. NEPTALI done.
Social work: Met with patient at bedside for additional snf choices, he gave choices of [facility], [facility] and [facility]- referrals faxed to all.      UPDATE: Phone call from admissions/[facility], they cannot accept patient on dapto d/t cost.   Await call back from [address] [place] [address] regarding acceptance or denial.
Social work: Phone call from [facility], patient denied. Referral to second choice, [facility]. UPDATE: Call from [facility], they are out of network. Will meet with patient for more snf choices.
Social work: Phone call from [facility] patient also denied for their facility d/t cost of IV antibiotics (Daptomycin specifically). [facility] informed and will reach out to physician to change medication.
Social work: Phone call to admissions/[facility] and admissions/[facility] to inform IV dapto can be changed to Zyvox PO BID 600mg, await confirmation from snf's if this is an acceptable change. If accepted at either facility, will need precert prior to dc.     Patient will need to participate with PT/OT today for precert (refused yesterday)
Social work: Spoke with [facility], precert still pending at this time.
Social work: authorization received for admission to [facility]. Patient to dc to [facility] via [facility]  at 5:30PM.  # for RN report: [phone]. Completed LOREE will need faxed to [phone]. Informed RN, pt, unit and facility of dc time, agreeable to plan. HENS completed.
and is agreeable to SNF but not [facility]. Patient uses [facility]. PT/OT to eval.  SNF vs home care.           Electronically signed by Ashley Roa RN on 11/4/20 at 9:13 AM EST

## 2020-11-12 NOTE — PLAN OF CARE
Problem: Pain:  Goal: Control of acute pain  Description: Control of acute pain  11/12/2020 1017 by Janay Arrington RN  Outcome: Ongoing  Note: Pt reports adequate pain control with current meds  11/12/2020 0335 by Robbie Barrera RN  Outcome: Ongoing     Problem: Skin Integrity:  Goal: Absence of new skin breakdown  Description: Absence of new skin breakdown  11/12/2020 1017 by Janay Arrington RN  Outcome: Ongoing  Note: No new skin breakdown  11/12/2020 0335 by Robbie Barrera RN  Outcome: Ongoing     Problem: Falls - Risk of:  Goal: Will remain free from falls  Description: Will remain free from falls  11/12/2020 1017 by Janay Arrington RN  Outcome: Ongoing  Note: Fall safety precautions remain in place, calls for assist appropriately  11/12/2020 0335 by Robbie Barrera RN  Outcome: Ongoing

## 2020-11-12 NOTE — PROGRESS NOTES
VASCULAR SURGERY  PROGRESS NOTE      11/12/2020 2:09 PM  Subjective:   Admit Date: 11/3/2020  PCP: Brianna Salcido DO    Chief Complaint   Patient presents with    Foot Pain     Interval History: No change. Still awaiting ECF precertification. Diet: DIET CARB CONTROL; Dietary Nutrition Supplements: Diabetic Oral Supplement    Medications:   Scheduled Meds:   linezolid  600 mg Oral 2 times per day    cefepime  2 g Intravenous Q12H    apixaban  5 mg Oral BID    atorvastatin  40 mg Oral Nightly    dilTIAZem  60 mg Oral 4 times per day    famotidine  20 mg Oral BID    insulin glargine  70 Units Subcutaneous Nightly    insulin lispro  0-12 Units Subcutaneous TID WC    insulin lispro  0-6 Units Subcutaneous Nightly    metFORMIN  500 mg Oral BID WC    nortriptyline  50 mg Oral Nightly    sodium chloride flush  10 mL Intravenous 2 times per day     Continuous Infusions:   dextrose           Labs:   CBC:   No results for input(s): WBC, HGB, PLT in the last 72 hours. BMP:    Recent Labs     11/11/20  0539   BUN 19   CREATININE 0.83     Hepatic: No results for input(s): AST, ALT, ALB, BILITOT, ALKPHOS in the last 72 hours. Troponin: Invalid input(s): TROPONIN  BNP: No results for input(s): BNP in the last 72 hours. Lipids: No results for input(s): CHOL, HDL in the last 72 hours. Invalid input(s): LDLCALCU  INR: No results for input(s): INR in the last 72 hours.     Objective:   Vitals: BP (!) 147/78   Pulse 86   Temp 97.5 °F (36.4 °C) (Oral)   Resp 18   Ht 6' 0.01\" (1.829 m)   Wt 162 lb 3.2 oz (73.6 kg)   SpO2 95%   BMI 21.99 kg/m²   General appearance: alert, cooperative and no distress  Mental Status: oriented to person, place and time with normal affect  Neck: good carotid pulses, no JVD  Lungs: clear to auscultation bilaterally, normal effort  Heart: regular rate and rhythm, no murmur,  Abdomen: soft, non-tender, non-distended, bowel sounds present all four quadrants, no masses,
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
## 2020-11-13 ENCOUNTER — CARE COORDINATION (OUTPATIENT)
Dept: CARE COORDINATION | Age: 65
End: 2020-11-13

## 2020-11-13 LAB
CULTURE: NORMAL
DIRECT EXAM: NORMAL
Lab: NORMAL
SPECIMEN DESCRIPTION: NORMAL

## 2020-11-13 NOTE — CARE COORDINATION
Patient was hospitalized at [facility] 11/3 - 11/12. Per notes, he was transferred to [address] [place] [address] to [place] [address] @ [phone]. Confirmed patient is at their facility       CM plan; graduate from care coordination at this time.  ACM can re-enroll if f future needs arise

## 2020-11-16 ENCOUNTER — CARE COORDINATION (OUTPATIENT)
Dept: CARE COORDINATION | Age: 65
End: 2020-11-16

## 2020-11-16 NOTE — CARE COORDINATION
Patient was  discharged to [facility]. Since was sent to a facility. I'm going to disenroll/graduate him from care coordination, patient removed from panel.

## 2020-11-18 ENCOUNTER — TELEPHONE (OUTPATIENT)
Dept: PODIATRY | Age: 65
End: 2020-11-18

## 2020-11-18 NOTE — LETTER
[facility]  [address] [facility] [address] [facility] [address]  Phone: [phone]  Fax: [address], DPM        November 18, 2020    [address] [address]      Dear Shahab Lord:    PLEASE CALL OUR OFFICE TO [facility]. AND PLEASE UPDATE YOUR CONTACT PHONE NUMBER, THE NUMBER WE HAVE ON FILE IS NON WORKING. THANK YOU. If you have any questions or concerns, please don't hesitate to call.     Sincerely,        Tabby Ross, DPM

## 2020-11-18 NOTE — TELEPHONE ENCOUNTER
Patient just got released from [facility] in [place] an has no medications. He is needing insulin, an antibiotic for his foot infection and his blood thinner. Daughter called and didn't have the names. Please advise.  Thank you

## 2020-11-18 NOTE — TELEPHONE ENCOUNTER
nima called, patient left today AMA and has NO meds, I advised outsalina that we have been giving him insulin samples and we would call daughter back when you respond to message

## 2020-11-18 NOTE — TELEPHONE ENCOUNTER
Spoke to Mehdi Augustin from [facility] , caller stated patient is home and he left AMA from facility. No future appointments in clinic at this time. Caller also stated that she encouraged patient to make a follow up appointment. Wound care orders given as instructed in last OV notes in  Dr Caren Alvarado notified.

## 2020-11-19 ENCOUNTER — TELEPHONE (OUTPATIENT)
Dept: INFECTIOUS DISEASES | Age: 65
End: 2020-11-19

## 2020-11-19 NOTE — TELEPHONE ENCOUNTER
Lima from [facility] called and stated patient left AMA and wants to know if you want to switch him to an oral abx or for them to got to try to get patient to do home IV ABX. ? Please advise.

## 2020-11-19 NOTE — TELEPHONE ENCOUNTER
Pt can have samples of basaglar or tudjeo/tresiba which ever we have and also eliquis blood thinner samples

## 2020-11-20 NOTE — TELEPHONE ENCOUNTER
11/19/2020 3:30 PM Lima from [place] called and stated patient left AMA and wants to know if you want to switch him to an oral abx or for them to got to try to get patient to do home IV ABX. ? Please advise. Hernan King  63year old male  1955  Daughter Brando Roe [phone] said the care was horrible that is why he left AMA. Donte Castillo wants permission to see pt at his home. Thanks, Marielos Read 11/19/2020 4:55 PM 11/20/2020 8:18 AM Good morning! What do you advise. :) Read 11/20/2020 8:18 AM     Spoke to Dr. Josette Marin and he gave me the OK to work with [facility] and get pt whatever he may need for ABX therapy. If he only had days then forget it 11/20/2020 8:45 AM Just set f/u in [address]  Patient will be in for appt Monday 23rd at 1:45. Spoke to patient and informed and called daughter.

## 2020-11-23 ENCOUNTER — OFFICE VISIT (OUTPATIENT)
Dept: INFECTIOUS DISEASES | Age: 65
End: 2020-11-23
Payer: MEDICARE

## 2020-11-23 VITALS
HEART RATE: 56 BPM | WEIGHT: 155 LBS | SYSTOLIC BLOOD PRESSURE: 128 MMHG | TEMPERATURE: 98.2 F | BODY MASS INDEX: 21.02 KG/M2 | OXYGEN SATURATION: 96 % | DIASTOLIC BLOOD PRESSURE: 74 MMHG

## 2020-11-23 PROCEDURE — 4004F PT TOBACCO SCREEN RCVD TLK: CPT | Performed by: INTERNAL MEDICINE

## 2020-11-23 PROCEDURE — 3046F HEMOGLOBIN A1C LEVEL >9.0%: CPT | Performed by: INTERNAL MEDICINE

## 2020-11-23 PROCEDURE — 1111F DSCHRG MED/CURRENT MED MERGE: CPT | Performed by: INTERNAL MEDICINE

## 2020-11-23 PROCEDURE — G8420 CALC BMI NORM PARAMETERS: HCPCS | Performed by: INTERNAL MEDICINE

## 2020-11-23 PROCEDURE — 2022F DILAT RTA XM EVC RTNOPTHY: CPT | Performed by: INTERNAL MEDICINE

## 2020-11-23 PROCEDURE — 3017F COLORECTAL CA SCREEN DOC REV: CPT | Performed by: INTERNAL MEDICINE

## 2020-11-23 PROCEDURE — G8427 DOCREV CUR MEDS BY ELIG CLIN: HCPCS | Performed by: INTERNAL MEDICINE

## 2020-11-23 PROCEDURE — 99214 OFFICE O/P EST MOD 30 MIN: CPT | Performed by: INTERNAL MEDICINE

## 2020-11-23 PROCEDURE — G8484 FLU IMMUNIZE NO ADMIN: HCPCS | Performed by: INTERNAL MEDICINE

## 2020-11-23 ASSESSMENT — ENCOUNTER SYMPTOMS
RESPIRATORY NEGATIVE: 1
GASTROINTESTINAL NEGATIVE: 1
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
## 2020-12-01 ENCOUNTER — TELEPHONE (OUTPATIENT)
Dept: PULMONOLOGY | Age: 65
End: 2020-12-01

## 2020-12-02 ENCOUNTER — TELEPHONE (OUTPATIENT)
Dept: PODIATRY | Age: 65
End: 2020-12-02

## 2020-12-02 ENCOUNTER — TELEPHONE (OUTPATIENT)
Dept: FAMILY MEDICINE CLINIC | Age: 65
End: 2020-12-02

## 2020-12-02 ENCOUNTER — CARE COORDINATION (OUTPATIENT)
Dept: CARE COORDINATION | Age: 65
End: 2020-12-02

## 2020-12-02 NOTE — TELEPHONE ENCOUNTER
Yes I ordered an xray of his chest and stomach to make sure he does not have aspiration pneumonia or some other infection ; have pt get done asap and go to ed if sxs worsen or persist

## 2020-12-02 NOTE — CARE COORDINATION
attempted to contact [facility] before signing off.  was informed patient is in SNF. ACM signed off.  did not leave a message.

## 2020-12-04 ENCOUNTER — TELEPHONE (OUTPATIENT)
Dept: FAMILY MEDICINE CLINIC | Age: 65
End: 2020-12-04

## 2020-12-04 NOTE — TELEPHONE ENCOUNTER
Miles from [facility], he respirated food on Wednesday and states he made follow up appt with us but none on file. Nurse states his lungs sound wet and patient does not look well      He advised patient to go to ER patient states that he might,     Patient thinks he is improving because at first he was hacking black up when he coughed and now its clear. Nurse is asking if we can call patient and advise ER asap if that is what you recommend.     He also did not get the xrays done yet

## 2020-12-04 NOTE — TELEPHONE ENCOUNTER
If he does seem worse then yes I would recommend ED rona since he did not get xrays done .  They can do the xrays there and get results a lot quicker and if everything is okay they may not keep him but better ot be safe that he goes

## 2020-12-07 NOTE — TELEPHONE ENCOUNTER
Yajaira called this morning, she is discharging patient because he hasn't been compliant and he's missing appts. The doctor will not see him anymore. Yajaira wanted to inform you on this so we're all on the same page. She states if you have any questions or concerns to give her a call.  [phone]

## 2020-12-14 ENCOUNTER — OFFICE VISIT (OUTPATIENT)
Dept: INFECTIOUS DISEASES | Age: 65
End: 2020-12-14
Payer: MEDICARE

## 2020-12-14 VITALS
SYSTOLIC BLOOD PRESSURE: 130 MMHG | OXYGEN SATURATION: 98 % | DIASTOLIC BLOOD PRESSURE: 80 MMHG | WEIGHT: 155 LBS | HEIGHT: 73 IN | TEMPERATURE: 97.7 F | HEART RATE: 103 BPM | BODY MASS INDEX: 20.54 KG/M2

## 2020-12-14 PROCEDURE — G8484 FLU IMMUNIZE NO ADMIN: HCPCS | Performed by: INTERNAL MEDICINE

## 2020-12-14 PROCEDURE — 99214 OFFICE O/P EST MOD 30 MIN: CPT | Performed by: INTERNAL MEDICINE

## 2020-12-14 PROCEDURE — G8420 CALC BMI NORM PARAMETERS: HCPCS | Performed by: INTERNAL MEDICINE

## 2020-12-14 PROCEDURE — G8427 DOCREV CUR MEDS BY ELIG CLIN: HCPCS | Performed by: INTERNAL MEDICINE

## 2020-12-14 PROCEDURE — 3046F HEMOGLOBIN A1C LEVEL >9.0%: CPT | Performed by: INTERNAL MEDICINE

## 2020-12-14 PROCEDURE — 2022F DILAT RTA XM EVC RTNOPTHY: CPT | Performed by: INTERNAL MEDICINE

## 2020-12-14 PROCEDURE — 3017F COLORECTAL CA SCREEN DOC REV: CPT | Performed by: INTERNAL MEDICINE

## 2020-12-14 PROCEDURE — 4004F PT TOBACCO SCREEN RCVD TLK: CPT | Performed by: INTERNAL MEDICINE

## 2020-12-14 ASSESSMENT — ENCOUNTER SYMPTOMS
GASTROINTESTINAL NEGATIVE: 1
RESPIRATORY NEGATIVE: 1
ALLERGIC/IMMUNOLOGIC NEGATIVE: 1
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
## 2020-12-18 ENCOUNTER — APPOINTMENT (OUTPATIENT)
Dept: GENERAL RADIOLOGY | Age: 65
End: 2020-12-18
Payer: MEDICARE

## 2020-12-18 ENCOUNTER — HOSPITAL ENCOUNTER (EMERGENCY)
Age: 65
Discharge: HOME | End: 2020-12-18
Attending: EMERGENCY MEDICINE
Payer: MEDICARE

## 2020-12-18 ENCOUNTER — APPOINTMENT (OUTPATIENT)
Dept: CT IMAGING | Age: 65
End: 2020-12-18
Payer: MEDICARE

## 2020-12-18 VITALS
BODY MASS INDEX: 20.54 KG/M2 | OXYGEN SATURATION: 92 % | HEART RATE: 102 BPM | WEIGHT: 155 LBS | TEMPERATURE: 98.4 F | DIASTOLIC BLOOD PRESSURE: 59 MMHG | SYSTOLIC BLOOD PRESSURE: 148 MMHG | RESPIRATION RATE: 20 BRPM | HEIGHT: 73 IN

## 2020-12-18 LAB
ABSOLUTE EOS #: 0.3 K/UL (ref 0–0.4)
ABSOLUTE IMMATURE GRANULOCYTE: 0 K/UL (ref 0–0.3)
ABSOLUTE LYMPH #: 2.1 K/UL (ref 1–4.8)
ABSOLUTE MONO #: 1.2 K/UL (ref 0.2–0.8)
ALBUMIN SERPL-MCNC: 3.6 G/DL (ref 3.5–5.2)
ALBUMIN/GLOBULIN RATIO: ABNORMAL (ref 1–2.5)
ALP BLD-CCNC: 137 U/L (ref 40–129)
ALT SERPL-CCNC: 7 U/L (ref 5–41)
ANION GAP SERPL CALCULATED.3IONS-SCNC: 14 MMOL/L (ref 9–17)
AST SERPL-CCNC: 12 U/L
BASOPHILS # BLD: 0 %
BASOPHILS ABSOLUTE: 0 K/UL (ref 0–0.2)
BETA-HYDROXYBUTYRATE: 0.62 MMOL/L (ref 0.02–0.27)
BILIRUB SERPL-MCNC: 0.25 MG/DL (ref 0.3–1.2)
BILIRUBIN DIRECT: <0.08 MG/DL
BILIRUBIN, INDIRECT: ABNORMAL MG/DL (ref 0–1)
BUN BLDV-MCNC: 21 MG/DL (ref 8–23)
BUN/CREAT BLD: 24 (ref 9–20)
CALCIUM SERPL-MCNC: 9.6 MG/DL (ref 8.6–10.4)
CHLORIDE BLD-SCNC: 93 MMOL/L (ref 98–107)
CHP ED QC CHECK: NORMAL
CO2: 25 MMOL/L (ref 20–31)
CREAT SERPL-MCNC: 0.88 MG/DL (ref 0.7–1.2)
DIFFERENTIAL TYPE: ABNORMAL
EOSINOPHILS RELATIVE PERCENT: 1 % (ref 1–4)
FIO2: ABNORMAL
GFR AFRICAN AMERICAN: >60 ML/MIN
GFR NON-AFRICAN AMERICAN: >60 ML/MIN
GFR SERPL CREATININE-BSD FRML MDRD: ABNORMAL ML/MIN/{1.73_M2}
GFR SERPL CREATININE-BSD FRML MDRD: ABNORMAL ML/MIN/{1.73_M2}
GLOBULIN: ABNORMAL G/DL (ref 1.5–3.8)
GLUCOSE BLD-MCNC: 280 MG/DL
GLUCOSE BLD-MCNC: 280 MG/DL (ref 75–110)
GLUCOSE BLD-MCNC: 504 MG/DL (ref 70–99)
HCO3 VENOUS: 28.7 MMOL/L (ref 24–30)
HCT VFR BLD CALC: 38.5 % (ref 40.7–50.3)
HEMOGLOBIN: 11.8 G/DL (ref 13–17)
IMMATURE GRANULOCYTES: 0 %
LIPASE: 15 U/L (ref 13–60)
LYMPHOCYTES # BLD: 7 % (ref 24–44)
MCH RBC QN AUTO: 26.5 PG (ref 25.2–33.5)
MCHC RBC AUTO-ENTMCNC: 30.6 G/DL (ref 28.4–34.8)
MCV RBC AUTO: 86.3 FL (ref 82.6–102.9)
MONOCYTES # BLD: 4 % (ref 1–7)
MORPHOLOGY: ABNORMAL
MYOGLOBIN: 34 NG/ML (ref 28–72)
MYOGLOBIN: 40 NG/ML (ref 28–72)
NEGATIVE BASE EXCESS, VEN: ABNORMAL (ref 0–2)
NRBC AUTOMATED: 0 PER 100 WBC
O2 DEVICE/FLOW/%: ABNORMAL
O2 SAT, VEN: 89 %
PATIENT TEMP: ABNORMAL
PCO2, VEN: 51 MM HG (ref 39–55)
PDW BLD-RTO: 14.7 % (ref 11.8–14.4)
PH VENOUS: 7.36 (ref 7.32–7.42)
PLATELET # BLD: 442 K/UL (ref 138–453)
PLATELET ESTIMATE: ABNORMAL
PMV BLD AUTO: 10.1 FL (ref 8.1–13.5)
PO2, VEN: 60 MM HG (ref 30–50)
POC PCO2 TEMP: ABNORMAL MM HG
POC PH TEMP: ABNORMAL
POC PO2 TEMP: ABNORMAL MM HG
POSITIVE BASE EXCESS, VEN: 3 (ref 0–2)
POTASSIUM SERPL-SCNC: 4.7 MMOL/L (ref 3.7–5.3)
RBC # BLD: 4.46 M/UL (ref 4.21–5.77)
RBC # BLD: ABNORMAL 10*6/UL
SEG NEUTROPHILS: 88 % (ref 36–66)
SEGMENTED NEUTROPHILS ABSOLUTE COUNT: 26.4 K/UL (ref 1.8–7.7)
SERUM OSMOLALITY: 310 MOSM/KG (ref 282–298)
SODIUM BLD-SCNC: 132 MMOL/L (ref 135–144)
TOTAL CO2, VENOUS: 30 MMOL/L (ref 25–31)
TOTAL PROTEIN: 7.1 G/DL (ref 6.4–8.3)
TROPONIN INTERP: NORMAL
TROPONIN INTERP: NORMAL
TROPONIN T: NORMAL NG/ML
TROPONIN T: NORMAL NG/ML
TROPONIN, HIGH SENSITIVITY: 20 NG/L (ref 0–22)
TROPONIN, HIGH SENSITIVITY: 22 NG/L (ref 0–22)
WBC # BLD: 30 K/UL (ref 3.5–11.3)
WBC # BLD: ABNORMAL 10*3/UL

## 2020-12-18 PROCEDURE — 80076 HEPATIC FUNCTION PANEL: CPT

## 2020-12-18 PROCEDURE — 84484 ASSAY OF TROPONIN QUANT: CPT

## 2020-12-18 PROCEDURE — 83690 ASSAY OF LIPASE: CPT

## 2020-12-18 PROCEDURE — 83874 ASSAY OF MYOGLOBIN: CPT

## 2020-12-18 PROCEDURE — 6360000002 HC RX W HCPCS: Performed by: NURSE PRACTITIONER

## 2020-12-18 PROCEDURE — 82947 ASSAY GLUCOSE BLOOD QUANT: CPT

## 2020-12-18 PROCEDURE — 6360000004 HC RX CONTRAST MEDICATION: Performed by: EMERGENCY MEDICINE

## 2020-12-18 PROCEDURE — 96375 TX/PRO/DX INJ NEW DRUG ADDON: CPT

## 2020-12-18 PROCEDURE — 80048 BASIC METABOLIC PNL TOTAL CA: CPT

## 2020-12-18 PROCEDURE — 93005 ELECTROCARDIOGRAM TRACING: CPT | Performed by: NURSE PRACTITIONER

## 2020-12-18 PROCEDURE — 82805 BLOOD GASES W/O2 SATURATION: CPT

## 2020-12-18 PROCEDURE — C9113 INJ PANTOPRAZOLE SODIUM, VIA: HCPCS | Performed by: NURSE PRACTITIONER

## 2020-12-18 PROCEDURE — 85025 COMPLETE CBC W/AUTO DIFF WBC: CPT

## 2020-12-18 PROCEDURE — 6360000004 HC RX CONTRAST MEDICATION: Performed by: NURSE PRACTITIONER

## 2020-12-18 PROCEDURE — 71045 X-RAY EXAM CHEST 1 VIEW: CPT

## 2020-12-18 PROCEDURE — 99284 EMERGENCY DEPT VISIT MOD MDM: CPT

## 2020-12-18 PROCEDURE — 96374 THER/PROPH/DIAG INJ IV PUSH: CPT

## 2020-12-18 PROCEDURE — 83930 ASSAY OF BLOOD OSMOLALITY: CPT

## 2020-12-18 PROCEDURE — 6370000000 HC RX 637 (ALT 250 FOR IP): Performed by: NURSE PRACTITIONER

## 2020-12-18 PROCEDURE — 2580000003 HC RX 258: Performed by: NURSE PRACTITIONER

## 2020-12-18 PROCEDURE — 74177 CT ABD & PELVIS W/CONTRAST: CPT

## 2020-12-18 PROCEDURE — 82803 BLOOD GASES ANY COMBINATION: CPT

## 2020-12-18 PROCEDURE — 36415 COLL VENOUS BLD VENIPUNCTURE: CPT

## 2020-12-18 PROCEDURE — 82010 KETONE BODYS QUAN: CPT

## 2020-12-18 PROCEDURE — 2580000003 HC RX 258: Performed by: EMERGENCY MEDICINE

## 2020-12-18 PROCEDURE — 71260 CT THORAX DX C+: CPT

## 2020-12-18 RX ORDER — SODIUM CHLORIDE 0.9 % (FLUSH) 0.9 %
10 SYRINGE (ML) INJECTION PRN
Status: DISCONTINUED | OUTPATIENT
Start: 2020-12-18 | End: 2020-12-19 | Stop reason: HOSPADM

## 2020-12-18 RX ORDER — 0.9 % SODIUM CHLORIDE 0.9 %
80 INTRAVENOUS SOLUTION INTRAVENOUS ONCE
Status: COMPLETED | OUTPATIENT
Start: 2020-12-18 | End: 2020-12-18

## 2020-12-18 RX ORDER — MORPHINE SULFATE 2 MG/ML
2 INJECTION, SOLUTION INTRAMUSCULAR; INTRAVENOUS ONCE
Status: COMPLETED | OUTPATIENT
Start: 2020-12-18 | End: 2020-12-18

## 2020-12-18 RX ORDER — SODIUM CHLORIDE 0.9 % (FLUSH) 0.9 %
10 SYRINGE (ML) INJECTION ONCE
Status: COMPLETED | OUTPATIENT
Start: 2020-12-18 | End: 2020-12-18

## 2020-12-18 RX ORDER — PREDNISONE 20 MG/1
40 TABLET ORAL DAILY
Qty: 10 TABLET | Refills: 0 | Status: SHIPPED | OUTPATIENT
Start: 2020-12-18 | End: 2020-12-23

## 2020-12-18 RX ORDER — PANTOPRAZOLE SODIUM 40 MG/10ML
40 INJECTION, POWDER, LYOPHILIZED, FOR SOLUTION INTRAVENOUS DAILY
Status: DISCONTINUED | OUTPATIENT
Start: 2020-12-18 | End: 2020-12-19 | Stop reason: HOSPADM

## 2020-12-18 RX ORDER — GUAIFENESIN AND CODEINE PHOSPHATE 100; 10 MG/5ML; MG/5ML
5 SOLUTION ORAL 3 TIMES DAILY PRN
Qty: 90 ML | Refills: 0 | Status: SHIPPED | OUTPATIENT
Start: 2020-12-18 | End: 2020-12-21

## 2020-12-18 RX ORDER — 0.9 % SODIUM CHLORIDE 0.9 %
1000 INTRAVENOUS SOLUTION INTRAVENOUS ONCE
Status: COMPLETED | OUTPATIENT
Start: 2020-12-18 | End: 2020-12-18

## 2020-12-18 RX ORDER — AZITHROMYCIN 250 MG/1
TABLET, FILM COATED ORAL
Qty: 1 PACKET | Refills: 0 | Status: SHIPPED | OUTPATIENT
Start: 2020-12-18 | End: 2020-12-28

## 2020-12-18 RX ADMIN — SODIUM CHLORIDE 1000 ML: 9 INJECTION, SOLUTION INTRAVENOUS at 17:58

## 2020-12-18 RX ADMIN — Medication 10 ML: at 19:48

## 2020-12-18 RX ADMIN — PANTOPRAZOLE SODIUM 40 MG: 40 INJECTION, POWDER, FOR SOLUTION INTRAVENOUS at 17:45

## 2020-12-18 RX ADMIN — LIDOCAINE HYDROCHLORIDE: 20 SOLUTION ORAL; TOPICAL at 17:45

## 2020-12-18 RX ADMIN — IOPAMIDOL 75 ML: 755 INJECTION, SOLUTION INTRAVENOUS at 21:36

## 2020-12-18 RX ADMIN — Medication 10 ML: at 21:36

## 2020-12-18 RX ADMIN — SODIUM CHLORIDE 80 ML: 9 INJECTION, SOLUTION INTRAVENOUS at 21:36

## 2020-12-18 RX ADMIN — MORPHINE SULFATE 2 MG: 2 INJECTION, SOLUTION INTRAMUSCULAR; INTRAVENOUS at 19:34

## 2020-12-18 RX ADMIN — INSULIN HUMAN 6 UNITS: 100 INJECTION, SOLUTION PARENTERAL at 19:35

## 2020-12-18 RX ADMIN — IOPAMIDOL 75 ML: 755 INJECTION, SOLUTION INTRAVENOUS at 19:47

## 2020-12-18 RX ADMIN — SODIUM CHLORIDE 80 ML: 9 INJECTION, SOLUTION INTRAVENOUS at 19:48

## 2020-12-18 ASSESSMENT — ENCOUNTER SYMPTOMS
COUGH: 0
NAUSEA: 1
SINUS PRESSURE: 0
SHORTNESS OF BREATH: 0
ABDOMINAL DISTENTION: 1
VOMITING: 1
COLOR CHANGE: 0

## 2020-12-18 ASSESSMENT — PAIN SCALES - GENERAL
PAINLEVEL_OUTOF10: 7
PAINLEVEL_OUTOF10: 10

## 2020-12-18 ASSESSMENT — PAIN DESCRIPTION - LOCATION: LOCATION: FOOT
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
## 2020-12-20 LAB
EKG ATRIAL RATE: 91 BPM
EKG P AXIS: 62 DEGREES
EKG P-R INTERVAL: 130 MS
EKG Q-T INTERVAL: 364 MS
EKG QRS DURATION: 80 MS
EKG QTC CALCULATION (BAZETT): 447 MS
EKG R AXIS: 52 DEGREES
EKG T AXIS: 61 DEGREES
EKG VENTRICULAR RATE: 91 BPM

## 2020-12-26 ENCOUNTER — APPOINTMENT (OUTPATIENT)
Dept: CT IMAGING | Age: 65
End: 2020-12-26
Payer: MEDICARE

## 2020-12-26 ENCOUNTER — HOSPITAL ENCOUNTER (EMERGENCY)
Age: 65
Discharge: HOME | End: 2020-12-26
Attending: EMERGENCY MEDICINE
Payer: MEDICARE

## 2020-12-26 ENCOUNTER — APPOINTMENT (OUTPATIENT)
Dept: GENERAL RADIOLOGY | Age: 65
End: 2020-12-26
Payer: MEDICARE

## 2020-12-26 VITALS
HEIGHT: 73 IN | HEART RATE: 106 BPM | OXYGEN SATURATION: 99 % | SYSTOLIC BLOOD PRESSURE: 140 MMHG | DIASTOLIC BLOOD PRESSURE: 70 MMHG | TEMPERATURE: 98.5 F | BODY MASS INDEX: 20.54 KG/M2 | WEIGHT: 155 LBS | RESPIRATION RATE: 16 BRPM

## 2020-12-26 LAB
ABSOLUTE EOS #: 0.06 K/UL (ref 0–0.44)
ABSOLUTE IMMATURE GRANULOCYTE: 0.17 K/UL (ref 0–0.3)
ABSOLUTE LYMPH #: 1.54 K/UL (ref 1.1–3.7)
ABSOLUTE MONO #: 1.01 K/UL (ref 0.1–1.2)
ANION GAP SERPL CALCULATED.3IONS-SCNC: 14 MMOL/L (ref 9–17)
BASOPHILS # BLD: 1 % (ref 0–2)
BASOPHILS ABSOLUTE: 0.08 K/UL (ref 0–0.2)
BNP INTERPRETATION: NORMAL
BUN BLDV-MCNC: 16 MG/DL (ref 8–23)
BUN/CREAT BLD: 17 (ref 9–20)
C-REACTIVE PROTEIN: 67.5 MG/L (ref 0–5)
CALCIUM SERPL-MCNC: 9.3 MG/DL (ref 8.6–10.4)
CHLORIDE BLD-SCNC: 95 MMOL/L (ref 98–107)
CO2: 23 MMOL/L (ref 20–31)
CREAT SERPL-MCNC: 0.92 MG/DL (ref 0.7–1.2)
DIFFERENTIAL TYPE: ABNORMAL
EOSINOPHILS RELATIVE PERCENT: 0 % (ref 1–4)
GFR AFRICAN AMERICAN: >60 ML/MIN
GFR NON-AFRICAN AMERICAN: >60 ML/MIN
GFR SERPL CREATININE-BSD FRML MDRD: ABNORMAL ML/MIN/{1.73_M2}
GFR SERPL CREATININE-BSD FRML MDRD: ABNORMAL ML/MIN/{1.73_M2}
GLUCOSE BLD-MCNC: 367 MG/DL (ref 75–110)
GLUCOSE BLD-MCNC: 474 MG/DL (ref 70–99)
HCT VFR BLD CALC: 40.2 % (ref 40.7–50.3)
HEMOGLOBIN: 12.2 G/DL (ref 13–17)
IMMATURE GRANULOCYTES: 1 %
INR BLD: 1
LACTIC ACID: 0.8 MMOL/L (ref 0.5–2.2)
LACTIC ACID: 1.4 MMOL/L (ref 0.5–2.2)
LYMPHOCYTES # BLD: 9 % (ref 24–43)
MCH RBC QN AUTO: 26.3 PG (ref 25.2–33.5)
MCHC RBC AUTO-ENTMCNC: 30.3 G/DL (ref 28.4–34.8)
MCV RBC AUTO: 86.6 FL (ref 82.6–102.9)
MONOCYTES # BLD: 6 % (ref 3–12)
NRBC AUTOMATED: 0 PER 100 WBC
PARTIAL THROMBOPLASTIN TIME: 26.4 SEC (ref 23.9–33.8)
PDW BLD-RTO: 14.8 % (ref 11.8–14.4)
PLATELET # BLD: 442 K/UL (ref 138–453)
PLATELET ESTIMATE: ABNORMAL
PMV BLD AUTO: 10.1 FL (ref 8.1–13.5)
POTASSIUM SERPL-SCNC: 4.6 MMOL/L (ref 3.7–5.3)
PRO-BNP: 175 PG/ML
PROTHROMBIN TIME: 12.8 SEC (ref 11.5–14.2)
RBC # BLD: 4.64 M/UL (ref 4.21–5.77)
RBC # BLD: ABNORMAL 10*6/UL
SEDIMENTATION RATE, ERYTHROCYTE: 57 MM (ref 0–20)
SEG NEUTROPHILS: 83 % (ref 36–65)
SEGMENTED NEUTROPHILS ABSOLUTE COUNT: 14.05 K/UL (ref 1.5–8.1)
SODIUM BLD-SCNC: 132 MMOL/L (ref 135–144)
TROPONIN INTERP: NORMAL
TROPONIN INTERP: NORMAL
TROPONIN T: NORMAL NG/ML
TROPONIN T: NORMAL NG/ML
TROPONIN, HIGH SENSITIVITY: 19 NG/L (ref 0–22)
TROPONIN, HIGH SENSITIVITY: 21 NG/L (ref 0–22)
WBC # BLD: 16.9 K/UL (ref 3.5–11.3)
WBC # BLD: ABNORMAL 10*3/UL

## 2020-12-26 PROCEDURE — 2580000003 HC RX 258: Performed by: EMERGENCY MEDICINE

## 2020-12-26 PROCEDURE — 85730 THROMBOPLASTIN TIME PARTIAL: CPT

## 2020-12-26 PROCEDURE — 82947 ASSAY GLUCOSE BLOOD QUANT: CPT

## 2020-12-26 PROCEDURE — 85610 PROTHROMBIN TIME: CPT

## 2020-12-26 PROCEDURE — 96374 THER/PROPH/DIAG INJ IV PUSH: CPT

## 2020-12-26 PROCEDURE — 70486 CT MAXILLOFACIAL W/O DYE: CPT

## 2020-12-26 PROCEDURE — 87075 CULTR BACTERIA EXCEPT BLOOD: CPT

## 2020-12-26 PROCEDURE — 87205 SMEAR GRAM STAIN: CPT

## 2020-12-26 PROCEDURE — 87077 CULTURE AEROBIC IDENTIFY: CPT

## 2020-12-26 PROCEDURE — 85652 RBC SED RATE AUTOMATED: CPT

## 2020-12-26 PROCEDURE — 84484 ASSAY OF TROPONIN QUANT: CPT

## 2020-12-26 PROCEDURE — 86140 C-REACTIVE PROTEIN: CPT

## 2020-12-26 PROCEDURE — 83880 ASSAY OF NATRIURETIC PEPTIDE: CPT

## 2020-12-26 PROCEDURE — 6370000000 HC RX 637 (ALT 250 FOR IP): Performed by: EMERGENCY MEDICINE

## 2020-12-26 PROCEDURE — 96375 TX/PRO/DX INJ NEW DRUG ADDON: CPT

## 2020-12-26 PROCEDURE — 99283 EMERGENCY DEPT VISIT LOW MDM: CPT

## 2020-12-26 PROCEDURE — 85025 COMPLETE CBC W/AUTO DIFF WBC: CPT

## 2020-12-26 PROCEDURE — 70450 CT HEAD/BRAIN W/O DYE: CPT

## 2020-12-26 PROCEDURE — 80048 BASIC METABOLIC PNL TOTAL CA: CPT

## 2020-12-26 PROCEDURE — 87070 CULTURE OTHR SPECIMN AEROBIC: CPT

## 2020-12-26 PROCEDURE — 6360000002 HC RX W HCPCS: Performed by: EMERGENCY MEDICINE

## 2020-12-26 PROCEDURE — 83605 ASSAY OF LACTIC ACID: CPT

## 2020-12-26 PROCEDURE — 86403 PARTICLE AGGLUT ANTBDY SCRN: CPT

## 2020-12-26 PROCEDURE — 93005 ELECTROCARDIOGRAM TRACING: CPT | Performed by: EMERGENCY MEDICINE

## 2020-12-26 PROCEDURE — 87186 SC STD MICRODIL/AGAR DIL: CPT

## 2020-12-26 PROCEDURE — 96372 THER/PROPH/DIAG INJ SC/IM: CPT

## 2020-12-26 PROCEDURE — 73630 X-RAY EXAM OF FOOT: CPT

## 2020-12-26 PROCEDURE — 71045 X-RAY EXAM CHEST 1 VIEW: CPT

## 2020-12-26 RX ORDER — FENTANYL CITRATE 50 UG/ML
50 INJECTION, SOLUTION INTRAMUSCULAR; INTRAVENOUS ONCE
Status: COMPLETED | OUTPATIENT
Start: 2020-12-26 | End: 2020-12-26

## 2020-12-26 RX ORDER — ONDANSETRON 4 MG/1
4 TABLET, ORALLY DISINTEGRATING ORAL EVERY 8 HOURS PRN
Qty: 20 TABLET | Refills: 0 | Status: ON HOLD | OUTPATIENT
Start: 2020-12-26 | End: 2021-04-09 | Stop reason: HOSPADM

## 2020-12-26 RX ORDER — PREDNISONE 20 MG/1
60 TABLET ORAL ONCE
Status: COMPLETED | OUTPATIENT
Start: 2020-12-26 | End: 2020-12-26

## 2020-12-26 RX ORDER — DOXYCYCLINE HYCLATE 100 MG
100 TABLET ORAL 2 TIMES DAILY
Qty: 20 TABLET | Refills: 0 | Status: ON HOLD | OUTPATIENT
Start: 2020-12-26 | End: 2021-01-11 | Stop reason: HOSPADM

## 2020-12-26 RX ORDER — 0.9 % SODIUM CHLORIDE 0.9 %
1000 INTRAVENOUS SOLUTION INTRAVENOUS ONCE
Status: COMPLETED | OUTPATIENT
Start: 2020-12-26 | End: 2020-12-26

## 2020-12-26 RX ORDER — PREDNISONE 50 MG/1
50 TABLET ORAL DAILY
Qty: 5 TABLET | Refills: 0 | Status: ON HOLD | OUTPATIENT
Start: 2020-12-26 | End: 2021-01-11 | Stop reason: HOSPADM

## 2020-12-26 RX ORDER — ONDANSETRON 2 MG/ML
4 INJECTION INTRAMUSCULAR; INTRAVENOUS ONCE
Status: COMPLETED | OUTPATIENT
Start: 2020-12-26 | End: 2020-12-26

## 2020-12-26 RX ADMIN — SODIUM CHLORIDE 1000 ML: 9 INJECTION, SOLUTION INTRAVENOUS at 10:57

## 2020-12-26 RX ADMIN — FENTANYL CITRATE 50 MCG: 50 INJECTION, SOLUTION INTRAMUSCULAR; INTRAVENOUS at 10:48

## 2020-12-26 RX ADMIN — ONDANSETRON 4 MG: 2 INJECTION INTRAMUSCULAR; INTRAVENOUS at 10:57

## 2020-12-26 RX ADMIN — INSULIN LISPRO 8 UNITS: 100 INJECTION, SOLUTION INTRAVENOUS; SUBCUTANEOUS at 12:57

## 2020-12-26 RX ADMIN — PREDNISONE 60 MG: 20 TABLET ORAL at 14:37

## 2020-12-26 ASSESSMENT — ENCOUNTER SYMPTOMS
SHORTNESS OF BREATH: 1
RHINORRHEA: 0
EYE DISCHARGE: 0
COUGH: 1
DIARRHEA: 0
COLOR CHANGE: 0
COLOR CHANGE: 1
VOMITING: 0
NAUSEA: 0
SORE THROAT: 0
EYE REDNESS: 0

## 2020-12-26 ASSESSMENT — PAIN DESCRIPTION - LOCATION: LOCATION: HEAD

## 2020-12-26 ASSESSMENT — PAIN SCALES - GENERAL
PAINLEVEL_OUTOF10: 7
PAINLEVEL_OUTOF10: 7

## 2020-12-26 ASSESSMENT — PAIN DESCRIPTION - DESCRIPTORS: DESCRIPTORS: SHARP;CONSTANT
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
## 2020-12-27 LAB
EKG ATRIAL RATE: 98 BPM
EKG P AXIS: 72 DEGREES
EKG P-R INTERVAL: 126 MS
EKG Q-T INTERVAL: 338 MS
EKG QRS DURATION: 82 MS
EKG QTC CALCULATION (BAZETT): 431 MS
EKG R AXIS: 65 DEGREES
EKG T AXIS: 65 DEGREES
EKG VENTRICULAR RATE: 98 BPM

## 2020-12-27 PROCEDURE — 93010 ELECTROCARDIOGRAM REPORT: CPT | Performed by: INTERNAL MEDICINE

## 2020-12-28 LAB
CULTURE: ABNORMAL
DIRECT EXAM: ABNORMAL
DIRECT EXAM: ABNORMAL
Lab: ABNORMAL
SPECIMEN DESCRIPTION: ABNORMAL

## 2020-12-30 ENCOUNTER — TELEPHONE (OUTPATIENT)
Dept: PODIATRY | Age: 65
End: 2020-12-30

## 2020-12-30 NOTE — TELEPHONE ENCOUNTER
Attempted to contact patient this morning due to podiatry clinic cancelled due to physician had emergency, was unable to leave voice mail due to voice mail being full.     Patient phoned office, informed patient that he needs to reschedule appointment, patient refused to make follow up appointment stated \" this is really bad and I am going to the Emergency room \" \" I cant wait until January 6th\"

## 2020-12-31 ENCOUNTER — APPOINTMENT (OUTPATIENT)
Dept: GENERAL RADIOLOGY | Age: 65
DRG: 464 | End: 2020-12-31
Payer: MEDICARE

## 2020-12-31 ENCOUNTER — HOSPITAL ENCOUNTER (INPATIENT)
Age: 65
LOS: 11 days | Discharge: NURSING, REHAB OR LONG-TERM CARE | DRG: 464 | End: 2021-01-11
Attending: EMERGENCY MEDICINE | Admitting: INTERNAL MEDICINE
Payer: MEDICARE

## 2020-12-31 DIAGNOSIS — L08.9 DIABETIC FOOT INFECTION (HCC): Primary | ICD-10-CM

## 2020-12-31 DIAGNOSIS — E11.628 DIABETIC FOOT INFECTION (HCC): Primary | ICD-10-CM

## 2020-12-31 DIAGNOSIS — G89.18 POST-OP PAIN: ICD-10-CM

## 2020-12-31 LAB
ABSOLUTE EOS #: 0 K/UL (ref 0–0.44)
ABSOLUTE IMMATURE GRANULOCYTE: 0.28 K/UL (ref 0–0.3)
ABSOLUTE LYMPH #: 0.85 K/UL (ref 1.1–3.7)
ABSOLUTE MONO #: 1.13 K/UL (ref 0.1–1.2)
ANION GAP SERPL CALCULATED.3IONS-SCNC: 11 MMOL/L (ref 9–17)
BASOPHILS # BLD: 0 % (ref 0–2)
BASOPHILS ABSOLUTE: 0 K/UL (ref 0–0.2)
BUN BLDV-MCNC: 25 MG/DL (ref 8–23)
BUN/CREAT BLD: 26 (ref 9–20)
C-REACTIVE PROTEIN: 60.2 MG/L (ref 0–5)
CALCIUM SERPL-MCNC: 8.8 MG/DL (ref 8.6–10.4)
CHLORIDE BLD-SCNC: 89 MMOL/L (ref 98–107)
CO2: 25 MMOL/L (ref 20–31)
CREAT SERPL-MCNC: 0.95 MG/DL (ref 0.7–1.2)
DIFFERENTIAL TYPE: ABNORMAL
EOSINOPHILS RELATIVE PERCENT: 0 % (ref 1–4)
ESTIMATED AVERAGE GLUCOSE: 332 MG/DL
GFR AFRICAN AMERICAN: >60 ML/MIN
GFR NON-AFRICAN AMERICAN: >60 ML/MIN
GFR SERPL CREATININE-BSD FRML MDRD: ABNORMAL ML/MIN/{1.73_M2}
GFR SERPL CREATININE-BSD FRML MDRD: ABNORMAL ML/MIN/{1.73_M2}
GLUCOSE BLD-MCNC: 309 MG/DL (ref 75–110)
GLUCOSE BLD-MCNC: 388 MG/DL (ref 75–110)
GLUCOSE BLD-MCNC: 846 MG/DL (ref 70–99)
HBA1C MFR BLD: 13.2 % (ref 4–6)
HCT VFR BLD CALC: 38.6 % (ref 40.7–50.3)
HEMOGLOBIN: 11.5 G/DL (ref 13–17)
IMMATURE GRANULOCYTES: 1 %
LACTIC ACID: 1.1 MMOL/L (ref 0.5–2.2)
LACTIC ACID: 1.1 MMOL/L (ref 0.5–2.2)
LYMPHOCYTES # BLD: 3 % (ref 24–43)
MCH RBC QN AUTO: 26.3 PG (ref 25.2–33.5)
MCHC RBC AUTO-ENTMCNC: 29.8 G/DL (ref 28.4–34.8)
MCV RBC AUTO: 88.3 FL (ref 82.6–102.9)
MONOCYTES # BLD: 4 % (ref 3–12)
NRBC AUTOMATED: 0 PER 100 WBC
PDW BLD-RTO: 15.3 % (ref 11.8–14.4)
PLATELET # BLD: 514 K/UL (ref 138–453)
PLATELET ESTIMATE: ABNORMAL
PMV BLD AUTO: 10.3 FL (ref 8.1–13.5)
POTASSIUM SERPL-SCNC: 4.8 MMOL/L (ref 3.7–5.3)
RBC # BLD: 4.37 M/UL (ref 4.21–5.77)
RBC # BLD: ABNORMAL 10*6/UL
SARS-COV-2, RAPID: NOT DETECTED
SARS-COV-2: NORMAL
SARS-COV-2: NORMAL
SEDIMENTATION RATE, ERYTHROCYTE: 70 MM (ref 0–20)
SEG NEUTROPHILS: 92 % (ref 36–65)
SEGMENTED NEUTROPHILS ABSOLUTE COUNT: 25.94 K/UL (ref 1.5–8.1)
SODIUM BLD-SCNC: 125 MMOL/L (ref 135–144)
SOURCE: NORMAL
WBC # BLD: 28.2 K/UL (ref 3.5–11.3)
WBC # BLD: ABNORMAL 10*3/UL

## 2020-12-31 PROCEDURE — 87075 CULTR BACTERIA EXCEPT BLOOD: CPT

## 2020-12-31 PROCEDURE — 99222 1ST HOSP IP/OBS MODERATE 55: CPT | Performed by: INTERNAL MEDICINE

## 2020-12-31 PROCEDURE — 2580000003 HC RX 258: Performed by: NURSE PRACTITIONER

## 2020-12-31 PROCEDURE — 6370000000 HC RX 637 (ALT 250 FOR IP): Performed by: NURSE PRACTITIONER

## 2020-12-31 PROCEDURE — 87186 SC STD MICRODIL/AGAR DIL: CPT

## 2020-12-31 PROCEDURE — 83605 ASSAY OF LACTIC ACID: CPT

## 2020-12-31 PROCEDURE — 82947 ASSAY GLUCOSE BLOOD QUANT: CPT

## 2020-12-31 PROCEDURE — 87070 CULTURE OTHR SPECIMN AEROBIC: CPT

## 2020-12-31 PROCEDURE — 86140 C-REACTIVE PROTEIN: CPT

## 2020-12-31 PROCEDURE — 6370000000 HC RX 637 (ALT 250 FOR IP): Performed by: EMERGENCY MEDICINE

## 2020-12-31 PROCEDURE — 36415 COLL VENOUS BLD VENIPUNCTURE: CPT

## 2020-12-31 PROCEDURE — 73630 X-RAY EXAM OF FOOT: CPT

## 2020-12-31 PROCEDURE — 99222 1ST HOSP IP/OBS MODERATE 55: CPT | Performed by: NURSE PRACTITIONER

## 2020-12-31 PROCEDURE — 6360000002 HC RX W HCPCS: Performed by: NURSE PRACTITIONER

## 2020-12-31 PROCEDURE — 87205 SMEAR GRAM STAIN: CPT

## 2020-12-31 PROCEDURE — 6370000000 HC RX 637 (ALT 250 FOR IP): Performed by: INTERNAL MEDICINE

## 2020-12-31 PROCEDURE — APPSS45 APP SPLIT SHARED TIME 31-45 MINUTES: Performed by: NURSE PRACTITIONER

## 2020-12-31 PROCEDURE — 83036 HEMOGLOBIN GLYCOSYLATED A1C: CPT

## 2020-12-31 PROCEDURE — 87077 CULTURE AEROBIC IDENTIFY: CPT

## 2020-12-31 PROCEDURE — 2580000003 HC RX 258: Performed by: EMERGENCY MEDICINE

## 2020-12-31 PROCEDURE — 86403 PARTICLE AGGLUT ANTBDY SCRN: CPT

## 2020-12-31 PROCEDURE — 99283 EMERGENCY DEPT VISIT LOW MDM: CPT

## 2020-12-31 PROCEDURE — 87040 BLOOD CULTURE FOR BACTERIA: CPT

## 2020-12-31 PROCEDURE — 85652 RBC SED RATE AUTOMATED: CPT

## 2020-12-31 PROCEDURE — 80048 BASIC METABOLIC PNL TOTAL CA: CPT

## 2020-12-31 PROCEDURE — 85025 COMPLETE CBC W/AUTO DIFF WBC: CPT

## 2020-12-31 PROCEDURE — U0002 COVID-19 LAB TEST NON-CDC: HCPCS

## 2020-12-31 PROCEDURE — 1200000000 HC SEMI PRIVATE

## 2020-12-31 PROCEDURE — 6360000002 HC RX W HCPCS: Performed by: EMERGENCY MEDICINE

## 2020-12-31 RX ORDER — POTASSIUM CHLORIDE 20 MEQ/1
40 TABLET, EXTENDED RELEASE ORAL PRN
Status: DISCONTINUED | OUTPATIENT
Start: 2020-12-31 | End: 2021-01-11 | Stop reason: HOSPADM

## 2020-12-31 RX ORDER — ONDANSETRON 2 MG/ML
4 INJECTION INTRAMUSCULAR; INTRAVENOUS EVERY 6 HOURS PRN
Status: DISCONTINUED | OUTPATIENT
Start: 2020-12-31 | End: 2021-01-11 | Stop reason: HOSPADM

## 2020-12-31 RX ORDER — NORTRIPTYLINE HYDROCHLORIDE 25 MG/1
50 CAPSULE ORAL NIGHTLY
Status: DISCONTINUED | OUTPATIENT
Start: 2020-12-31 | End: 2021-01-11 | Stop reason: HOSPADM

## 2020-12-31 RX ORDER — SODIUM CHLORIDE 0.9 % (FLUSH) 0.9 %
10 SYRINGE (ML) INJECTION PRN
Status: DISCONTINUED | OUTPATIENT
Start: 2020-12-31 | End: 2021-01-11 | Stop reason: HOSPADM

## 2020-12-31 RX ORDER — ALBUTEROL SULFATE 90 UG/1
2 AEROSOL, METERED RESPIRATORY (INHALATION) EVERY 6 HOURS PRN
Status: DISCONTINUED | OUTPATIENT
Start: 2020-12-31 | End: 2021-01-11 | Stop reason: HOSPADM

## 2020-12-31 RX ORDER — NICOTINE POLACRILEX 4 MG
15 LOZENGE BUCCAL PRN
Status: DISCONTINUED | OUTPATIENT
Start: 2020-12-31 | End: 2021-01-11 | Stop reason: HOSPADM

## 2020-12-31 RX ORDER — SODIUM CHLORIDE 9 MG/ML
INJECTION, SOLUTION INTRAVENOUS CONTINUOUS
Status: DISCONTINUED | OUTPATIENT
Start: 2020-12-31 | End: 2021-01-07

## 2020-12-31 RX ORDER — OXYCODONE HYDROCHLORIDE AND ACETAMINOPHEN 5; 325 MG/1; MG/1
2 TABLET ORAL EVERY 6 HOURS PRN
Status: DISCONTINUED | OUTPATIENT
Start: 2020-12-31 | End: 2021-01-11 | Stop reason: HOSPADM

## 2020-12-31 RX ORDER — MAGNESIUM SULFATE 1 G/100ML
1 INJECTION INTRAVENOUS PRN
Status: DISCONTINUED | OUTPATIENT
Start: 2020-12-31 | End: 2021-01-11 | Stop reason: HOSPADM

## 2020-12-31 RX ORDER — PROMETHAZINE HYDROCHLORIDE 12.5 MG/1
12.5 TABLET ORAL EVERY 6 HOURS PRN
Status: DISCONTINUED | OUTPATIENT
Start: 2020-12-31 | End: 2021-01-11 | Stop reason: HOSPADM

## 2020-12-31 RX ORDER — SODIUM CHLORIDE 0.9 % (FLUSH) 0.9 %
10 SYRINGE (ML) INJECTION EVERY 12 HOURS SCHEDULED
Status: DISCONTINUED | OUTPATIENT
Start: 2020-12-31 | End: 2021-01-11 | Stop reason: HOSPADM

## 2020-12-31 RX ORDER — ACETAMINOPHEN 650 MG/1
650 SUPPOSITORY RECTAL EVERY 6 HOURS PRN
Status: DISCONTINUED | OUTPATIENT
Start: 2020-12-31 | End: 2021-01-11 | Stop reason: HOSPADM

## 2020-12-31 RX ORDER — ACETAMINOPHEN 325 MG/1
650 TABLET ORAL EVERY 6 HOURS PRN
Status: DISCONTINUED | OUTPATIENT
Start: 2020-12-31 | End: 2021-01-11 | Stop reason: HOSPADM

## 2020-12-31 RX ORDER — POLYETHYLENE GLYCOL 3350 17 G/17G
17 POWDER, FOR SOLUTION ORAL DAILY PRN
Status: DISCONTINUED | OUTPATIENT
Start: 2020-12-31 | End: 2021-01-11 | Stop reason: HOSPADM

## 2020-12-31 RX ORDER — DEXTROSE MONOHYDRATE 50 MG/ML
100 INJECTION, SOLUTION INTRAVENOUS PRN
Status: DISCONTINUED | OUTPATIENT
Start: 2020-12-31 | End: 2021-01-11 | Stop reason: HOSPADM

## 2020-12-31 RX ORDER — POTASSIUM CHLORIDE 7.45 MG/ML
10 INJECTION INTRAVENOUS PRN
Status: DISCONTINUED | OUTPATIENT
Start: 2020-12-31 | End: 2021-01-11 | Stop reason: HOSPADM

## 2020-12-31 RX ORDER — INSULIN GLARGINE 100 [IU]/ML
35 INJECTION, SOLUTION SUBCUTANEOUS ONCE
Status: COMPLETED | OUTPATIENT
Start: 2020-12-31 | End: 2020-12-31

## 2020-12-31 RX ORDER — INSULIN GLARGINE 100 [IU]/ML
30 INJECTION, SOLUTION SUBCUTANEOUS ONCE
Status: DISCONTINUED | OUTPATIENT
Start: 2020-12-31 | End: 2020-12-31

## 2020-12-31 RX ORDER — DIPHENHYDRAMINE HCL 25 MG
25 TABLET ORAL NIGHTLY PRN
Status: DISCONTINUED | OUTPATIENT
Start: 2020-12-31 | End: 2021-01-11 | Stop reason: HOSPADM

## 2020-12-31 RX ORDER — INSULIN GLARGINE 100 [IU]/ML
70 INJECTION, SOLUTION SUBCUTANEOUS NIGHTLY
Status: DISCONTINUED | OUTPATIENT
Start: 2020-12-31 | End: 2020-12-31

## 2020-12-31 RX ORDER — INSULIN GLARGINE 100 [IU]/ML
70 INJECTION, SOLUTION SUBCUTANEOUS NIGHTLY
Status: DISCONTINUED | OUTPATIENT
Start: 2021-01-01 | End: 2021-01-03

## 2020-12-31 RX ORDER — MORPHINE SULFATE 4 MG/ML
4 INJECTION, SOLUTION INTRAMUSCULAR; INTRAVENOUS
Status: DISCONTINUED | OUTPATIENT
Start: 2020-12-31 | End: 2021-01-11 | Stop reason: HOSPADM

## 2020-12-31 RX ORDER — DEXTROSE MONOHYDRATE 25 G/50ML
12.5 INJECTION, SOLUTION INTRAVENOUS PRN
Status: DISCONTINUED | OUTPATIENT
Start: 2020-12-31 | End: 2021-01-11 | Stop reason: HOSPADM

## 2020-12-31 RX ORDER — MORPHINE SULFATE 2 MG/ML
2 INJECTION, SOLUTION INTRAMUSCULAR; INTRAVENOUS
Status: DISCONTINUED | OUTPATIENT
Start: 2020-12-31 | End: 2021-01-11 | Stop reason: HOSPADM

## 2020-12-31 RX ORDER — NICOTINE 21 MG/24HR
1 PATCH, TRANSDERMAL 24 HOURS TRANSDERMAL DAILY PRN
Status: DISCONTINUED | OUTPATIENT
Start: 2020-12-31 | End: 2021-01-11 | Stop reason: HOSPADM

## 2020-12-31 RX ORDER — FAMOTIDINE 20 MG/1
20 TABLET, FILM COATED ORAL 2 TIMES DAILY
Status: DISCONTINUED | OUTPATIENT
Start: 2020-12-31 | End: 2021-01-08

## 2020-12-31 RX ADMIN — APIXABAN 5 MG: 5 TABLET, FILM COATED ORAL at 17:26

## 2020-12-31 RX ADMIN — INSULIN GLARGINE 35 UNITS: 100 INJECTION, SOLUTION SUBCUTANEOUS at 17:27

## 2020-12-31 RX ADMIN — MORPHINE SULFATE 2 MG: 2 INJECTION, SOLUTION INTRAMUSCULAR; INTRAVENOUS at 20:40

## 2020-12-31 RX ADMIN — PIPERACILLIN SODIUM AND TAZOBACTAM SODIUM 4.5 G: 4; .5 INJECTION, POWDER, LYOPHILIZED, FOR SOLUTION INTRAVENOUS at 11:08

## 2020-12-31 RX ADMIN — INSULIN LISPRO 18 UNITS: 100 INJECTION, SOLUTION INTRAVENOUS; SUBCUTANEOUS at 17:26

## 2020-12-31 RX ADMIN — DAPTOMYCIN 400 MG: 500 INJECTION, POWDER, LYOPHILIZED, FOR SOLUTION INTRAVENOUS at 17:26

## 2020-12-31 RX ADMIN — SODIUM CHLORIDE: 9 INJECTION, SOLUTION INTRAVENOUS at 15:14

## 2020-12-31 RX ADMIN — FAMOTIDINE 20 MG: 20 TABLET, FILM COATED ORAL at 20:40

## 2020-12-31 RX ADMIN — INSULIN LISPRO 6 UNITS: 100 INJECTION, SOLUTION INTRAVENOUS; SUBCUTANEOUS at 20:51

## 2020-12-31 RX ADMIN — METFORMIN HYDROCHLORIDE 500 MG: 500 TABLET ORAL at 17:26

## 2020-12-31 RX ADMIN — INSULIN GLARGINE 35 UNITS: 100 INJECTION, SOLUTION SUBCUTANEOUS at 20:51

## 2020-12-31 RX ADMIN — OXYCODONE AND ACETAMINOPHEN 2 TABLET: 5; 325 TABLET ORAL at 15:14

## 2020-12-31 RX ADMIN — INSULIN HUMAN 10 UNITS: 100 INJECTION, SOLUTION PARENTERAL at 11:15

## 2020-12-31 RX ADMIN — NORTRIPTYLINE HYDROCHLORIDE 50 MG: 25 CAPSULE ORAL at 20:39

## 2020-12-31 RX ADMIN — CEFEPIME HYDROCHLORIDE 2 G: 2 INJECTION, POWDER, FOR SOLUTION INTRAVENOUS at 15:14

## 2020-12-31 ASSESSMENT — PAIN DESCRIPTION - PROGRESSION
CLINICAL_PROGRESSION: NOT CHANGED

## 2020-12-31 ASSESSMENT — PAIN SCALES - GENERAL
PAINLEVEL_OUTOF10: 9
PAINLEVEL_OUTOF10: 7
PAINLEVEL_OUTOF10: 9

## 2020-12-31 ASSESSMENT — PAIN DESCRIPTION - LOCATION: LOCATION: FOOT

## 2020-12-31 ASSESSMENT — PAIN DESCRIPTION - ONSET: ONSET: ON-GOING

## 2020-12-31 ASSESSMENT — PAIN DESCRIPTION - DESCRIPTORS: DESCRIPTORS: ACHING;DULL

## 2020-12-31 NOTE — CARE COORDINATION
Social work: Patient known from previous admissions to [address] [place] [address]. Spoke with todd/[facility] patient was at their facility from 11/12-11/17 and left [facility] was arranged at that time. If patient needs snf and patient is agreeable, alex may consider accepting patient again. To reach admissions contact [phone].
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
## 2020-12-31 NOTE — PROGRESS NOTES
Pharmacy Consult      Vicenta Chavis is a 63 y.o. male for whom pharmacy has been consulted to dose Zosyn.     Patient Active Problem List   Diagnosis    Type 2 diabetes mellitus with diabetic polyneuropathy, with long-term current use of insulin (HCC)    Neuropathic pain, leg    Diabetic polyneuropathy (HCC)    Allergic rhinitis    Tobacco dependence    Edentulous    Dysphagia    Lung nodules    Esophageal cancer (HCC)    Fracture of humerus, left, closed    Closed fracture of humerus    DM type 2 with diabetic peripheral neuropathy (HCC)    Chronic obstructive pulmonary disease (HCC)    HLD (hyperlipidemia)    Gastroesophageal reflux disease    Chronic pain syndrome    Marijuana use    History of colon polyps    Cellulitis of right heel    Functional diarrhea    Sepsis due to methicillin resistant Staphylococcus aureus (MRSA) (HCC)    History of esophageal cancer    Osteomyelitis, chronic, ankle or foot ([address]    Peripheral artery disease ([address]    Other pulmonary embolism without acute cor pulmonale (HCC)    Carotid stenosis, asymptomatic, bilateral    Lower limb amputation status    Fx humeral neck, right, closed, initial encounter    Transient hypotension    Constipation due to opioid therapy    Acute kidney injury ([address]    Diabetic ulcer of left midfoot associated with type 2 diabetes mellitus, with fat layer exposed ([address]    Complication of below knee amputation stump (HCC)    COPD exacerbation (HCC)    Hyponatremia    Pain, phantom limb ([address]    Below-knee amputation of right lower extremity ([address]    Moderate protein malnutrition ([address]    Essential hypertension    Uncontrolled type 2 diabetes mellitus with hyperglycemia ([address]    History of pulmonary embolism - 2017    Atelectasis    Uncontrolled type 2 diabetes mellitus with foot ulcer (HCC)    Azotemia    Anemia, normocytic normochromic    Osteomyelitis of fourth phalange of left foot ([address]    Drug-induced constipation    Osteomyelitis (HCC)    Diabetic foot infection ([facility]    Noncompliance    Type 1 diabetes mellitus with diabetic foot infection ([facility]    Acute osteomyelitis of left foot (HCC)    Cellulitis of left foot    Mild malnutrition (HCC)       Allergies:  Gabapentin     No results for input(s): CREATININE in the last 72 hours. Ht/Wt:   Ht Readings from Last 1 Encounters:   12/31/20 6' 1\" (1.854 m)        Wt Readings from Last 1 Encounters:   12/31/20 150 lb (68 kg)         Estimated Creatinine Clearance: 79 mL/min (based on SCr of 0.92 mg/dL). Assessment/Plan:    Will load with 4.5g of Zosyn    Due to patient's history of VRE, recommended ID consult instead of vancomycin to determine appropriate therapy. Dr. Pari Reece agreed. Vanco D/C for now. Thank you for the consult. Will continue to follow. Solomon Gupta.  Gokul Gonzales, PharmD, [address]  Emergency Department and Critical Care Pharmacist
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
## 2020-12-31 NOTE — FLOWSHEET NOTE
Patient was sitting in bed resting as writer entered the room (no family members present). Patient engaged in brief conversation and stated he's doing as well as can be expected. Patient stated he has no specific needs at the present moment. Writer stated he will pray for continued comfort, healing, and rest during his stay. The patient was thankful for the short visit and prayers. Spiritual care will follow up as needed or requested. 12/31/20 1606   Encounter Summary   Services provided to: Patient   Referral/Consult From: Krunal   Continue Visiting   (12/31/2020)   Complexity of Encounter Low   Length of Encounter 15 minutes   Spiritual Assessment Completed Yes   Routine   Type Initial   Assessment Calm; Approachable;Coping   Intervention Active listening;Discussed illness/injury and it's impact   Outcome Expressed gratitude;Engaged in conversation

## 2020-12-31 NOTE — ED NOTES
Spoke with Esperanza, pts daughter. She was making writer aware that pt has dog in vehicle and she is attempting to have someone get dog. Writer acknowledged efforts. Encouraged dtr to call if update is needed.       Sara Jennings RN  12/31/20 [id number]

## 2020-12-31 NOTE — CARE COORDINATION
Case Management Initial Discharge Plan  Lady Vogel,         Readmission Risk              Risk of Unplanned Readmission:        61             Met with:patient to discuss discharge plans. Information verified: address, contacts, phone number, , insurance Yes  PCP: Lavell Brumfield DO  Date of last visit: 20    Insurance Provider: [address]    Discharge Planning  Current Residence:   house  Living Arrangements:   children   Home has 2 stories/no stairs to climb  Support Systems:   children  Current Services PTA:    none  Patient able to perform ADL's:Independent  DME used to aid ambulation prior to admission: 2ww  During admission: none    Potential Assistance Needed:   home care vs snf    Pharmacy: [facility]   Potential Assistance Purchasing Medications:   no  Does patient want to participate in local refill/ meds to beds program?   no    Patient agreeable to home care: Yes  Freedom of choice provided:  yes      Type of Home Care Services:   SN/PT  Patient expects to be discharged to:   Home vs snf    Prior SNF/Rehab Placement and Facility: yes, [facility] , [facility] and [address] [place] [address] to SNF/Rehab: tbd  [place] of choice provided: yes   Evaluation: yes    Expected Discharge date:   21  Follow Up Appointment: Best Day/ Time:      Transportation provider: family vs [facility]  Transportation arrangements needed for discharge: tbd    Discharge Plan:   Patient stated [facility] home care services ended recently. Patient stated he will consider home care or snf pending recommendations. Patient is being admitted for diabetic foot infection. Wound care to consult. IV Vanco started in ED. Discharge needs tbd.         Electronically signed by HUNTER Alvarado on 20 at 11:23 AM EST

## 2020-12-31 NOTE — PROGRESS NOTES
Podiatry to the patient room; bedside debridement and wound culture; packing and dressing per podiatry at this time. Plan of care discussed.

## 2020-12-31 NOTE — ED NOTES
Pt called out via call light. Pt requested heat turned down and for food. Writer moved thermostat all the way to cool side. Writer informed pt, \"Dr will evaluate your foot shortly and you can request food at that time\". Pt also asked when the Dr would come. Writer informed him \"shortly\". Safety maintained.       Nishant Rios RN  12/31/20 [id number]
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
## 2020-12-31 NOTE — PLAN OF CARE
Problem: Discharge Planning:  Goal: Discharged to appropriate level of care  Description: Discharged to appropriate level of care  Outcome: Ongoing     Problem: Serum Glucose Level - Abnormal:  Goal: Ability to maintain appropriate glucose levels will improve  Description: Ability to maintain appropriate glucose levels will improve  Outcome: Ongoing     Problem: Sensory Perception - Impaired:  Goal: Ability to maintain a stable neurologic state will improve  Description: Ability to maintain a stable neurologic state will improve  Outcome: Ongoing     Problem: SAFETY  Goal: Free from accidental physical injury  Outcome: Ongoing  Goal: Free from intentional harm  Outcome: Ongoing     Problem: DAILY CARE  Goal: Daily care needs are met  Outcome: Ongoing     Problem: PAIN  Goal: Patient's pain/discomfort is manageable  Outcome: Ongoing     Problem: SKIN INTEGRITY  Goal: Skin integrity is maintained or improved  Outcome: Ongoing     Problem: KNOWLEDGE DEFICIT  Goal: Patient/S.O. demonstrates understanding of disease process, treatment plan, medications, and discharge instructions.   Outcome: Ongoing     Problem: DISCHARGE BARRIERS  Goal: Patient's continuum of care needs are met  Outcome: Ongoing     Problem: Skin Integrity:  Goal: Absence of new skin breakdown  Description: Absence of new skin breakdown  Outcome: Ongoing

## 2020-12-31 NOTE — ED NOTES
Pt has called out and requested lab staff to give him water and to unwrap his foot. Writer educated pt to process of Dr evaluation before any unwrapping of foot will be done. Pt questioned,  \" so I have to sit here in pain\"? Writer asked \"how long have been in pain and what did you take for the pain prior to coming in\"? Pt responded \"ever since it got wrapped a week ago. Shanelle Aj i didn't take anything for it\". Writer asked pt to to await the Dr lora. Safety maintained.       Brooklynn Cuevas RN  12/31/20 [id number]

## 2021-01-01 ENCOUNTER — ANESTHESIA (OUTPATIENT)
Dept: OPERATING ROOM | Age: 66
DRG: 464 | End: 2021-01-01
Payer: MEDICARE

## 2021-01-01 ENCOUNTER — ANESTHESIA EVENT (OUTPATIENT)
Dept: OPERATING ROOM | Age: 66
DRG: 464 | End: 2021-01-01
Payer: MEDICARE

## 2021-01-01 VITALS — SYSTOLIC BLOOD PRESSURE: 98 MMHG | OXYGEN SATURATION: 97 % | DIASTOLIC BLOOD PRESSURE: 53 MMHG

## 2021-01-01 PROBLEM — E87.6 HYPOKALEMIA: Status: ACTIVE | Noted: 2021-01-01

## 2021-01-01 PROBLEM — E83.51 HYPOCALCEMIA: Status: ACTIVE | Noted: 2021-01-01

## 2021-01-01 LAB
ANION GAP SERPL CALCULATED.3IONS-SCNC: 8 MMOL/L (ref 9–17)
BUN BLDV-MCNC: 20 MG/DL (ref 8–23)
BUN/CREAT BLD: 26 (ref 9–20)
CALCIUM SERPL-MCNC: 8.4 MG/DL (ref 8.6–10.4)
CHLORIDE BLD-SCNC: 104 MMOL/L (ref 98–107)
CO2: 25 MMOL/L (ref 20–31)
CREAT SERPL-MCNC: 0.76 MG/DL (ref 0.7–1.2)
ESTIMATED AVERAGE GLUCOSE: 338 MG/DL
GFR AFRICAN AMERICAN: >60 ML/MIN
GFR NON-AFRICAN AMERICAN: >60 ML/MIN
GFR SERPL CREATININE-BSD FRML MDRD: ABNORMAL ML/MIN/{1.73_M2}
GFR SERPL CREATININE-BSD FRML MDRD: ABNORMAL ML/MIN/{1.73_M2}
GLUCOSE BLD-MCNC: 102 MG/DL (ref 75–110)
GLUCOSE BLD-MCNC: 110 MG/DL (ref 75–110)
GLUCOSE BLD-MCNC: 114 MG/DL (ref 70–99)
GLUCOSE BLD-MCNC: 135 MG/DL (ref 75–110)
GLUCOSE BLD-MCNC: 259 MG/DL (ref 75–110)
GLUCOSE BLD-MCNC: 268 MG/DL (ref 75–110)
HBA1C MFR BLD: 13.4 % (ref 4–6)
HCT VFR BLD CALC: 35 % (ref 40.7–50.3)
HEMOGLOBIN: 10.5 G/DL (ref 13–17)
MAGNESIUM: 1.9 MG/DL (ref 1.6–2.6)
MCH RBC QN AUTO: 26 PG (ref 25.2–33.5)
MCHC RBC AUTO-ENTMCNC: 30 G/DL (ref 28.4–34.8)
MCV RBC AUTO: 86.6 FL (ref 82.6–102.9)
NRBC AUTOMATED: 0 PER 100 WBC
PDW BLD-RTO: 15.2 % (ref 11.8–14.4)
PLATELET # BLD: 465 K/UL (ref 138–453)
PMV BLD AUTO: 9.4 FL (ref 8.1–13.5)
POTASSIUM SERPL-SCNC: 3.5 MMOL/L (ref 3.7–5.3)
RBC # BLD: 4.04 M/UL (ref 4.21–5.77)
SODIUM BLD-SCNC: 137 MMOL/L (ref 135–144)
WBC # BLD: 22.4 K/UL (ref 3.5–11.3)

## 2021-01-01 PROCEDURE — 0QBM0ZZ EXCISION OF LEFT TARSAL, OPEN APPROACH: ICD-10-PCS | Performed by: INTERNAL MEDICINE

## 2021-01-01 PROCEDURE — 3700000000 HC ANESTHESIA ATTENDED CARE: Performed by: STUDENT IN AN ORGANIZED HEALTH CARE EDUCATION/TRAINING PROGRAM

## 2021-01-01 PROCEDURE — 2500000003 HC RX 250 WO HCPCS: Performed by: ANESTHESIOLOGY

## 2021-01-01 PROCEDURE — 6360000002 HC RX W HCPCS: Performed by: ANESTHESIOLOGY

## 2021-01-01 PROCEDURE — 6360000002 HC RX W HCPCS: Performed by: STUDENT IN AN ORGANIZED HEALTH CARE EDUCATION/TRAINING PROGRAM

## 2021-01-01 PROCEDURE — 2580000003 HC RX 258: Performed by: ANESTHESIOLOGY

## 2021-01-01 PROCEDURE — 6360000002 HC RX W HCPCS: Performed by: NURSE PRACTITIONER

## 2021-01-01 PROCEDURE — 6370000000 HC RX 637 (ALT 250 FOR IP): Performed by: STUDENT IN AN ORGANIZED HEALTH CARE EDUCATION/TRAINING PROGRAM

## 2021-01-01 PROCEDURE — 3700000001 HC ADD 15 MINUTES (ANESTHESIA): Performed by: STUDENT IN AN ORGANIZED HEALTH CARE EDUCATION/TRAINING PROGRAM

## 2021-01-01 PROCEDURE — 7100000001 HC PACU RECOVERY - ADDTL 15 MIN: Performed by: STUDENT IN AN ORGANIZED HEALTH CARE EDUCATION/TRAINING PROGRAM

## 2021-01-01 PROCEDURE — 3600000012 HC SURGERY LEVEL 2 ADDTL 15MIN: Performed by: STUDENT IN AN ORGANIZED HEALTH CARE EDUCATION/TRAINING PROGRAM

## 2021-01-01 PROCEDURE — 88304 TISSUE EXAM BY PATHOLOGIST: CPT

## 2021-01-01 PROCEDURE — 7100000000 HC PACU RECOVERY - FIRST 15 MIN: Performed by: STUDENT IN AN ORGANIZED HEALTH CARE EDUCATION/TRAINING PROGRAM

## 2021-01-01 PROCEDURE — 82947 ASSAY GLUCOSE BLOOD QUANT: CPT

## 2021-01-01 PROCEDURE — 88311 DECALCIFY TISSUE: CPT

## 2021-01-01 PROCEDURE — 83735 ASSAY OF MAGNESIUM: CPT

## 2021-01-01 PROCEDURE — 1200000000 HC SEMI PRIVATE

## 2021-01-01 PROCEDURE — 99232 SBSQ HOSP IP/OBS MODERATE 35: CPT | Performed by: INTERNAL MEDICINE

## 2021-01-01 PROCEDURE — 2709999900 HC NON-CHARGEABLE SUPPLY: Performed by: STUDENT IN AN ORGANIZED HEALTH CARE EDUCATION/TRAINING PROGRAM

## 2021-01-01 PROCEDURE — 2580000003 HC RX 258: Performed by: NURSE PRACTITIONER

## 2021-01-01 PROCEDURE — 3600000002 HC SURGERY LEVEL 2 BASE: Performed by: STUDENT IN AN ORGANIZED HEALTH CARE EDUCATION/TRAINING PROGRAM

## 2021-01-01 PROCEDURE — 99232 SBSQ HOSP IP/OBS MODERATE 35: CPT | Performed by: NURSE PRACTITIONER

## 2021-01-01 PROCEDURE — 2580000003 HC RX 258: Performed by: STUDENT IN AN ORGANIZED HEALTH CARE EDUCATION/TRAINING PROGRAM

## 2021-01-01 PROCEDURE — 80048 BASIC METABOLIC PNL TOTAL CA: CPT

## 2021-01-01 PROCEDURE — 0JBR0ZZ EXCISION OF LEFT FOOT SUBCUTANEOUS TISSUE AND FASCIA, OPEN APPROACH: ICD-10-PCS | Performed by: INTERNAL MEDICINE

## 2021-01-01 PROCEDURE — 85027 COMPLETE CBC AUTOMATED: CPT

## 2021-01-01 PROCEDURE — 36415 COLL VENOUS BLD VENIPUNCTURE: CPT

## 2021-01-01 PROCEDURE — 2500000003 HC RX 250 WO HCPCS: Performed by: STUDENT IN AN ORGANIZED HEALTH CARE EDUCATION/TRAINING PROGRAM

## 2021-01-01 RX ORDER — BACITRACIN, NEOMYCIN, POLYMYXIN B 400; 3.5; 5 [USP'U]/G; MG/G; [USP'U]/G
OINTMENT TOPICAL 2 TIMES DAILY
Status: DISCONTINUED | OUTPATIENT
Start: 2021-01-01 | End: 2021-01-11 | Stop reason: HOSPADM

## 2021-01-01 RX ORDER — ONDANSETRON 2 MG/ML
4 INJECTION INTRAMUSCULAR; INTRAVENOUS
Status: DISCONTINUED | OUTPATIENT
Start: 2021-01-01 | End: 2021-01-01 | Stop reason: HOSPADM

## 2021-01-01 RX ORDER — FENTANYL CITRATE 50 UG/ML
25 INJECTION, SOLUTION INTRAMUSCULAR; INTRAVENOUS EVERY 5 MIN PRN
Status: DISCONTINUED | OUTPATIENT
Start: 2021-01-01 | End: 2021-01-01 | Stop reason: HOSPADM

## 2021-01-01 RX ORDER — LIDOCAINE HYDROCHLORIDE 20 MG/ML
INJECTION, SOLUTION EPIDURAL; INFILTRATION; INTRACAUDAL; PERINEURAL PRN
Status: DISCONTINUED | OUTPATIENT
Start: 2021-01-01 | End: 2021-01-01 | Stop reason: SDUPTHER

## 2021-01-01 RX ORDER — HYDROMORPHONE HCL 110MG/55ML
0.25 PATIENT CONTROLLED ANALGESIA SYRINGE INTRAVENOUS EVERY 5 MIN PRN
Status: DISCONTINUED | OUTPATIENT
Start: 2021-01-01 | End: 2021-01-01 | Stop reason: HOSPADM

## 2021-01-01 RX ORDER — MIDAZOLAM HYDROCHLORIDE 1 MG/ML
INJECTION INTRAMUSCULAR; INTRAVENOUS PRN
Status: DISCONTINUED | OUTPATIENT
Start: 2021-01-01 | End: 2021-01-01 | Stop reason: SDUPTHER

## 2021-01-01 RX ORDER — FENTANYL CITRATE 50 UG/ML
INJECTION, SOLUTION INTRAMUSCULAR; INTRAVENOUS PRN
Status: DISCONTINUED | OUTPATIENT
Start: 2021-01-01 | End: 2021-01-01 | Stop reason: SDUPTHER

## 2021-01-01 RX ORDER — SODIUM CHLORIDE 9 MG/ML
INJECTION, SOLUTION INTRAVENOUS CONTINUOUS PRN
Status: DISCONTINUED | OUTPATIENT
Start: 2021-01-01 | End: 2021-01-01 | Stop reason: SDUPTHER

## 2021-01-01 RX ORDER — PROPOFOL 10 MG/ML
INJECTION, EMULSION INTRAVENOUS CONTINUOUS PRN
Status: DISCONTINUED | OUTPATIENT
Start: 2021-01-01 | End: 2021-01-01 | Stop reason: SDUPTHER

## 2021-01-01 RX ADMIN — INSULIN LISPRO 5 UNITS: 100 INJECTION, SOLUTION INTRAVENOUS; SUBCUTANEOUS at 21:58

## 2021-01-01 RX ADMIN — ONDANSETRON 4 MG: 2 INJECTION INTRAMUSCULAR; INTRAVENOUS at 21:58

## 2021-01-01 RX ADMIN — MIDAZOLAM 2 MG: 1 INJECTION INTRAMUSCULAR; INTRAVENOUS at 09:06

## 2021-01-01 RX ADMIN — MORPHINE SULFATE 2 MG: 2 INJECTION, SOLUTION INTRAMUSCULAR; INTRAVENOUS at 02:18

## 2021-01-01 RX ADMIN — CEFEPIME HYDROCHLORIDE 2 G: 2 INJECTION, POWDER, FOR SOLUTION INTRAVENOUS at 03:53

## 2021-01-01 RX ADMIN — INSULIN LISPRO 9 UNITS: 100 INJECTION, SOLUTION INTRAVENOUS; SUBCUTANEOUS at 16:45

## 2021-01-01 RX ADMIN — MORPHINE SULFATE 2 MG: 2 INJECTION, SOLUTION INTRAMUSCULAR; INTRAVENOUS at 18:13

## 2021-01-01 RX ADMIN — SODIUM CHLORIDE: 9 INJECTION, SOLUTION INTRAVENOUS at 12:59

## 2021-01-01 RX ADMIN — MORPHINE SULFATE 2 MG: 2 INJECTION, SOLUTION INTRAMUSCULAR; INTRAVENOUS at 22:05

## 2021-01-01 RX ADMIN — METFORMIN HYDROCHLORIDE 500 MG: 500 TABLET ORAL at 12:10

## 2021-01-01 RX ADMIN — MORPHINE SULFATE 2 MG: 2 INJECTION, SOLUTION INTRAMUSCULAR; INTRAVENOUS at 05:52

## 2021-01-01 RX ADMIN — SODIUM CHLORIDE: 9 INJECTION, SOLUTION INTRAVENOUS at 09:02

## 2021-01-01 RX ADMIN — ONDANSETRON 4 MG: 2 INJECTION INTRAMUSCULAR; INTRAVENOUS at 02:18

## 2021-01-01 RX ADMIN — INSULIN GLARGINE 70 UNITS: 100 INJECTION, SOLUTION SUBCUTANEOUS at 21:58

## 2021-01-01 RX ADMIN — OXYCODONE AND ACETAMINOPHEN 2 TABLET: 5; 325 TABLET ORAL at 20:42

## 2021-01-01 RX ADMIN — FAMOTIDINE 20 MG: 20 TABLET, FILM COATED ORAL at 21:58

## 2021-01-01 RX ADMIN — LIDOCAINE HYDROCHLORIDE 5 ML: 20 INJECTION, SOLUTION EPIDURAL; INFILTRATION; INTRACAUDAL; PERINEURAL at 09:08

## 2021-01-01 RX ADMIN — FAMOTIDINE 20 MG: 20 TABLET, FILM COATED ORAL at 12:10

## 2021-01-01 RX ADMIN — APIXABAN 5 MG: 5 TABLET, FILM COATED ORAL at 21:58

## 2021-01-01 RX ADMIN — NORTRIPTYLINE HYDROCHLORIDE 50 MG: 25 CAPSULE ORAL at 21:58

## 2021-01-01 RX ADMIN — POLYMYXIN B SULFATE, BACITRACIN ZINC, NEOMYCIN SULFATE: 5000; 3.5; 4 OINTMENT TOPICAL at 22:03

## 2021-01-01 RX ADMIN — PROPOFOL 50 MCG/KG/MIN: 10 INJECTION, EMULSION INTRAVENOUS at 09:08

## 2021-01-01 RX ADMIN — CEFEPIME HYDROCHLORIDE 2 G: 2 INJECTION, POWDER, FOR SOLUTION INTRAVENOUS at 15:06

## 2021-01-01 RX ADMIN — DAPTOMYCIN 400 MG: 500 INJECTION, POWDER, LYOPHILIZED, FOR SOLUTION INTRAVENOUS at 15:48

## 2021-01-01 RX ADMIN — POTASSIUM CHLORIDE 40 MEQ: 20 TABLET, EXTENDED RELEASE ORAL at 14:04

## 2021-01-01 RX ADMIN — Medication 50 MCG: at 09:09

## 2021-01-01 RX ADMIN — METFORMIN HYDROCHLORIDE 500 MG: 500 TABLET ORAL at 16:45

## 2021-01-01 RX ADMIN — OXYCODONE AND ACETAMINOPHEN 2 TABLET: 5; 325 TABLET ORAL at 14:04

## 2021-01-01 ASSESSMENT — PULMONARY FUNCTION TESTS
PIF_VALUE: 0
PIF_VALUE: 1
PIF_VALUE: 0
PIF_VALUE: 1
PIF_VALUE: 0
PIF_VALUE: 0
PIF_VALUE: 1
PIF_VALUE: 0
PIF_VALUE: 1
PIF_VALUE: 0

## 2021-01-01 ASSESSMENT — ENCOUNTER SYMPTOMS
NAUSEA: 0
COUGH: 1
ABDOMINAL PAIN: 0
GASTROINTESTINAL NEGATIVE: 1
VOMITING: 0
RESPIRATORY NEGATIVE: 1
SHORTNESS OF BREATH: 0

## 2021-01-01 ASSESSMENT — PAIN DESCRIPTION - ONSET
ONSET: ON-GOING
ONSET: ON-GOING

## 2021-01-01 ASSESSMENT — PAIN DESCRIPTION - FREQUENCY: FREQUENCY: CONTINUOUS

## 2021-01-01 ASSESSMENT — PAIN SCALES - GENERAL
PAINLEVEL_OUTOF10: 6
PAINLEVEL_OUTOF10: 8
PAINLEVEL_OUTOF10: 6
PAINLEVEL_OUTOF10: 8

## 2021-01-01 ASSESSMENT — LIFESTYLE VARIABLES: SMOKING_STATUS: 1

## 2021-01-01 ASSESSMENT — PAIN DESCRIPTION - ORIENTATION
ORIENTATION: LEFT
ORIENTATION: LEFT

## 2021-01-01 ASSESSMENT — PAIN DESCRIPTION - PROGRESSION: CLINICAL_PROGRESSION: NOT CHANGED

## 2021-01-01 ASSESSMENT — PAIN DESCRIPTION - DESCRIPTORS: DESCRIPTORS: SHARP

## 2021-01-01 ASSESSMENT — PAIN DESCRIPTION - LOCATION: LOCATION: FOOT

## 2021-01-01 NOTE — PLAN OF CARE
Problem: Serum Glucose Level - Abnormal:  Goal: Ability to maintain appropriate glucose levels will improve  Description: Ability to maintain appropriate glucose levels will improve  1/1/2021 1255 by Maribell Montalvo RN  Outcome: Ongoing  Note: Glucose levels normalizing  1/1/2021 0439 by Henry Pinon RN  Outcome: Ongoing     Problem: SAFETY  Goal: Free from accidental physical injury  1/1/2021 1255 by Maribell Montalvo RN  Outcome: Ongoing  Note: Fall safety precautions remain in place  1/1/2021 0439 by Henry Pinon RN  Outcome: Ongoing     Problem: PAIN  Goal: Patient's pain/discomfort is manageable  1/1/2021 1255 by Maribell Montalvo RN  Outcome: Ongoing  Note: Pt reports adequate pain control with currentmeds  1/1/2021 0439 by Henry Pinon RN  Outcome: Ongoing
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
## 2021-01-01 NOTE — CARE COORDINATION
Pt had I&D of lt foot per podiatry. Had been with [facility] and was discharged 12-7-20 due to non compliance. SW will follow for possible SNF placement at discharge.

## 2021-01-01 NOTE — PLAN OF CARE
Problem: Serum Glucose Level - Abnormal:  Goal: Ability to maintain appropriate glucose levels will improve  Description: Ability to maintain appropriate glucose levels will improve  1/1/2021 0439 by Toshia Machuca RN  Outcome: Ongoing     Problem: Sensory Perception - Impaired:  Goal: Ability to maintain a stable neurologic state will improve  Description: Ability to maintain a stable neurologic state will improve  1/1/2021 0439 by Toshia Machuca RN  Outcome: Ongoing     Problem: SAFETY  Goal: Free from accidental physical injury  1/1/2021 0439 by Toshia Machuca RN  Outcome: Ongoing     Problem: PAIN  Goal: Patient's pain/discomfort is manageable  1/1/2021 0439 by Toshia Machuca RN  Outcome: Ongoing     Problem: SKIN INTEGRITY  Goal: Skin integrity is maintained or improved  1/1/2021 0439 by Toshia Machuca RN  Outcome: Ongoing

## 2021-01-01 NOTE — PROGRESS NOTES
Progress Note  Podiatric Medicine and Surgery     Subjective     CC: Left foot infection    Patient seen and examined at bedside. No acute events overnight. Afebrile, vital signs stable   Leukocytosis improving 28.2>22.4  Patient NPO since midnight    HPI :  Chari Carednas is a 63 y. o. male seen at [facility] the floor for a wound on the left foot. The patient was last seen in the emergency department at [facility] [address] on 12/26/2020, at which time patient refused to be admitted so he was given a follow-up for podiatry for 12/28/2020, an appointment which patient missed. The patient is well known to Dr. Immanuel Springer who performed an I&D and a revision of TMA 8/24/2020 of left lower extremity. Patient is also well known to Dr. Jyotsna Armas. Patient states he has been compliant with the antibiotic prescription given at his last emergency department visit. 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 Patient states he wears a diabetic shoe to the left lower extremity when ambulating. Of note, patient has a prior BKA to [facility] and reports he has fallen numerous times over the last week because of issues with his right lower extremity prosthetic. Patient denies hitting his head, losing consciousness or suffering any trauma with the falls. Patient admits to smoking on & off, up to 1 pack per day when smoking heavily. The patient denies any other pedal complaints at this time.     PCP is Toan Stahl DO    ROS: Denies N/V/F/C/SOB/CP. Otherwise negative except at stated in the HPI.      Medications:  Scheduled Meds:   apixaban  5 mg Oral BID    metFORMIN  500 mg Oral BID WC    nortriptyline  50 mg Oral Nightly    sodium chloride flush  10 mL Intravenous 2 times per day    insulin lispro  0-18 Units Subcutaneous TID WC    insulin lispro  0-9 Units Subcutaneous Nightly    cefepime  2 g Intravenous Q12H    daptomycin (CUBICIN) IVPB  6 mg/kg Intravenous Q24H    insulin glargine  70 Units Subcutaneous Nightly    famotidine  20 mg Oral BID       Continuous Infusions:   sodium chloride 75 mL/hr at 20 1514    dextrose         PRN Meds:albuterol sulfate HFA, oxyCODONE-acetaminophen, sodium chloride flush, potassium chloride **OR** potassium alternative oral replacement **OR** potassium chloride, magnesium sulfate, promethazine **OR** ondansetron, polyethylene glycol, nicotine, acetaminophen **OR** acetaminophen, morphine **OR** morphine, glucose, dextrose, glucagon (rDNA), dextrose, diphenhydrAMINE    Objective     Vitals:  Patient Vitals for the past 8 hrs:   BP Temp Temp src Pulse Resp SpO2 Weight   21 0743 134/62 98.4 °F (36.9 °C) Oral 97 16 93 % --   21 0454 -- -- -- -- -- -- 156 lb (70.8 kg)     Average, Min, and Max for last 24 hours Vitals:  TEMPERATURE:  Temp  Av °F (36.7 °C)  Min: 97.7 °F (36.5 °C)  Max: 98.4 °F (36.9 °C)    RESPIRATIONS RANGE: Resp  Av  Min: 16  Max: 16    PULSE RANGE: Pulse  Av.7  Min: 84  Max: 97    BLOOD PRESSURE RANGE:  Systolic (35NYW), QTM:391 , Min:127 , ECA:146   ; Diastolic (57CTF), DZ, Min:62, Max:69      PULSE OXIMETRY RANGE: SpO2  Av.3 %  Min: 93 %  Max: 97 %    I/O last 3 completed shifts: In: 013 [P.O.:700; I.V.:178; IV Piggyback:100]  Out: 875 [Urine:875]    CBC:  Recent Labs     20  0745 21  0606   WBC 28.2* 22.4*   HGB 11.5* 10.5*   HCT 38.6* 35.0*   * 465*   CRP 60.2*  --         BMP:  Recent Labs     20  0745 21  0606   * 137   K 4.8 3.5*   CL 89* 104   CO2 25 25   BUN 25* 20   CREATININE 0.95 0.76   GLUCOSE 846* 114*   CALCIUM 8.8 8.4*        Coags:  No results for input(s): APTT, PROT, INR in the last 72 hours.     Lab Results   Component Value Date    SEDRATE 70 (H) 2020     Recent Labs     20  0745   CRP 60.2*       Left Lower Extremity Physical Exam: Dressings left intact for OR today, Physical exam from 2002     Vascular: DP and PT pulses are Non-palpable, PT and AT audible on doppler.  CFT <3 seconds to dorsum of foot.  Hair growth is absent to the foot.  Moderate non-pitting edema to the left lower extremity.       Neuro: Saph/sural/SP/DP/plantar sensation diminished to light touch.     Musculoskeletal: Muscle strength is 4/5 against resistance to lower extremity muscle groups. Gross deformity is present, right LE BKA & left LE TMA. TTP absent to left foot, ankle, and calf.      Dermatologic:  Ulcer #1 located level of 5th metatarsal TMA site and measures approximately 0.5cm x 0.5cm x 0.5cm pre-debridement with fluctuance below eschar & approximately 7 cm x 5 cm x 2 cm post debridement. The wound base is fibro-granular. Periwound skin is eschar.  2 cc of sero-purulent drainage was expressed with no associated mal odor. Erythema present to dorso-lateral foot below level of ankle with associated increase in warmth. Does probe to bone. Does not sinus track, or undermine. No fluctuance post-debridement. No crepitus or induration pre- or post-debridement.      Ulcer #2 located level of first metatarsal TMA site, distally and measures approximately 0.5 cm x 0.5 cm x 0.2 cm. The wound base is fibrogranular. Periwound skin is hyperkeratotic. No drainage with no associated increase in warmth. Mild periwound erythema. Does not probe to bone. Does not sinus track, undermine, or have fluctuance. No crepitus or induration.     Superficial, dry, intact eschar present to dorsal foot and medial foot. No drainage noted, does not probe. No fluctuance, crepitus, or induration noted.  Minimal associated erythema with minimal increase in warmth.     Clinical Images: 12/31/2020     Pre-debridement-left            Post-debridement-left        Right      Imaging:   XR FOOT LEFT (MIN 3 VIEWS)   Final Result   Ulceration about the remaining 5th metatarsal appears larger in the interval.   While the underlying bone appears unchanged in the interval, osteomyelitis is   not excluded. No soft tissue gas identified. Cultures:   Culture, Anaerobic and Aerobic [[id number]] (Abnormal) Collected: 12/31/20 1907   Order Status: Completed Specimen: Foot Updated: 12/31/20 2248    Specimen Description . FOOT LEFT    Special Requests NOT REPORTED    Direct Exam NO NEUTROPHILS SEEN     RARE GRAM POSITIVE COCCI IN PAIRSAbnormal     Culture PENDING   Culture, Wound [[id number]] (Abnormal) Collected: 12/31/20 1240   Order Status: Completed Specimen: Foot Updated: 12/31/20 1554    Specimen Description . FOOT SWAB    Special Requests NOT REPORTED    Direct Exam RARE NEUTROPHILSAbnormal      RARE GRAM POSITIVE RODSAbnormal      RARE GRAM NEGATIVE RODSAbnormal     Culture PENDING          Assessment   Mar Curling is a 63 y.o. male with   1. Ulcer to level of bone, left foot  2. Cellulitis, Left LE  3. S/p revision of TMA & I&D (DOS: 8/24/2020), LLE  4. 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 Previous BKA, RLE   6. COPD  7. CAD    Principal Problem:    Diabetic ulcer of left midfoot associated with type 2 diabetes mellitus, with fat layer exposed ([address]  Active Problems:    DM type 2 with diabetic peripheral neuropathy (HCC)    Chronic obstructive pulmonary disease (HCC)    HLD (hyperlipidemia)    Gastroesophageal reflux disease    Peripheral artery disease (HCC)    COPD exacerbation (HCC)    Essential hypertension    Diabetic foot infection ([address]    Diabetic infection of left foot ([address]  Resolved Problems:    * No resolved hospital problems. *       Plan     · Patient examined and evaluated at bedside. · Treatment options discussed in detail with the patient. · Discussed with patient that given the clinical appearance of his foot and the purulence expressed he could benefit from IV antibiotics &  surgical debridement during this admission. Patient elicits understanding and agrees to the plan, remains amenable to surgery.   · Educated patient on the importance of smoking cessation to allow for optimal healing. · Educated patient on the importance of tight glycemic control for optimal healing. · Medical management per primary team.  · ABx per ID : Cefepime, daptomycin, Zosyn  · Dressings left intact for surgery today: betadine-WTD, DSD, lightly wrapped with Ace. · PWB to heel touch, left lower extremity in surgical shoe. · Plan per podiatry is for OR wound debridement today at 9 a.m. Would appreciate surgical risk stratification from primary team.   § Covid pending.    § NPO at midnight  § Hold Ayad Mccoy after midnight   § Consent signed & in patient's chart   · Discussed with  Dr. Quoc Cabrera DPM   Podiatric Medicine & Surgery   1/1/2021 at 7:53 AM

## 2021-01-01 NOTE — PROGRESS NOTES
[facility]  Progress Note    Cj Barros  MRN: [id number]  Date: 1/1/2021  LOS: 1     Reason for F/U :   Left transmetatarsal amputation stump infection    Impression :   1. Left transmetatarsal amputation stump site infection with underlying osteomyelitis of the first and fifth metatarsals  2. Leukocytosis secondary to above, improving  3. 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    Recommendations:   · Patient continues on cefepime and daptomycin  · Previous cultures grew VRE, Proteus mirabilis, coagulase-negative Staphylococcus in August 2020  · Patient underwent incision and drainage of the left foot with removal of nonviable bone and soft tissue  · Blood cultures are pending  · Superficial cultures thus far with gram-negative rods and gram-positive cocci in pairs and rods  · Await culture and pathology    Infection Control Recommendations:   Contact precautions    Discharge Planning:   Estimated Length of IV antimicrobials: To be determined  Patient will need Midline Catheter Insertion/ PICC line Insertion: No  Patient will need: Home IV , [place],  SNF,  LTAC: Undetermined  Patient willneed outpatient wound care: No    Medical Decision making / Summary of Stay:   Cj Barros is a 63y.o.-year-old male who was initially admitted on 12/31/2020. Patient      Patient is known to our practice for multiple hospitalizations since July 2020. He was initially admitted July 28, 2020 for left hallux gangrene/left foot cellulitis and plantar ulcerations. He underwent left superficial femoral, popliteal, tibial, peroneal trunk, and posterior tibial artery atherectomy/balloon angioplasty 7/29/2020. Patient underwent left TMA with Achilles tendon lengthening 8/5/2020. Patient was discharged 8/7/2020 to rehab on doxycycline and Augmentin x1 week.     8/23/2020 patient returned to [facility] saying he never received wound care.   He was found to have wound dehiscence with concern for underlying osteomyelitis. 8/24/2020 he underwent incision and drainage of the left foot with revision of left foot TMA. At that time, cultures grew VRE, Proteus mirabilis, and coagulase-negative Staphylococcus. He was discharged on oral ciprofloxacin and linezolid through 9/25/2020 to complete a 4-week course.     11/3/2020 patient returned to the emergency department due to drainage from his foot. Patient was found to have TMA stump infection with underlying osteomyelitis of the metatarsals, first and fifth metatarsals did probe to the bone. At that time, we did discuss with podiatry as well as the patient that he would likely need to undergo left BKA; however, they both opted to treat patient with 6 weeks of IV antibiotics. He was discharged on 11/12/2020 on IV cefepime and linezolid to an extended care facility with plans to continue through 12/15/2020.  11/19/2020 patient left skilled nursing facility AMA. IV cefepime was stopped at that time. Patient continued on oral Zyvox. 11/23/2020 patient saw Dr. Marilee Schmitt in the office and patient was instructed to complete the oral linezolid. Patient did follow-up again in the office 12/14/2020 and wound was smaller without any overt signs of infection at that time.     12/26/2020 patient presented to [facility] due to falling and having increasing pain and redness in the left foot. WBC 16,900, CRP 67.5. The wound probed to the bone at that time. It was recommended for patient to be admitted, undergo debridement, and receive IV antibiotics. Patient refused. He was prescribed doxycycline 100 mg p.o. twice a day x10 days and patient signed out [address]     Patient returned to the emergency department today due to pain in the left foot. He does tell me that he attempted to go to his podiatrist at the beginning of the week but could not get in.   Upon arrival to the emergency department, it was noted that he had the same dressing on his foot that was placed in the emergency department last week. He did rate pain 7 out of 10, throbbing and constant. He has not had any documented fevers but does have hot and cold flashes. Patient does not really have any other complaints aside from wanting pain medication.     Patient has been started on vancomycin and Zosyn. We have been consulted to assist with antimicrobial therapy management. 2021 incision and drainage of the left foot with removal of nonviable bone and soft tissue    Current evaluation:2021    BP (!) 147/71   Pulse 108   Temp 98.2 °F (36.8 °C) (Oral)   Resp 18   Ht 6' 1\" (1.854 m)   Wt 156 lb (70.8 kg)   SpO2 97%   BMI 20.58 kg/m²     Temperature Range: Temp: 98.2 °F (36.8 °C) Temp  Av.8 °F (36.6 °C)  Min: 97 °F (36.1 °C)  Max: 98.4 °F (36.9 °C)    Review of Systems   Constitutional: Negative. HENT: Negative. Respiratory: Negative. Cardiovascular: Negative. Gastrointestinal: Negative. Genitourinary: Negative. Musculoskeletal:        Pain in lle   Skin: Negative. Neurological: Negative. Hematological: Negative. Psychiatric/Behavioral: Negative. Physical Examination :     Physical Exam  Constitutional:       General: He is not in acute distress. Appearance: Normal appearance. He is normal weight. HENT:      Head: Normocephalic and atraumatic. Pulmonary:      Effort: Pulmonary effort is normal. No respiratory distress. Abdominal:      General: Abdomen is flat. There is no distension. Musculoskeletal:      Comments: Right lower extremity BKA. Left lower extremity with multiple abrasions/scab/eschar with surgical dressing on stump, not removed   Skin:     General: Skin is warm and dry. Neurological:      General: No focal deficit present. Mental Status: He is alert and oriented to person, place, and time. Mental status is at baseline.    Psychiatric:         Mood and Affect: Mood normal. Behavior: Behavior normal.         Laboratory data:   I have independently reviewed the followinglabs:  CBC with Differential:   Recent Labs     12/31/20  0745 01/01/21  0606   WBC 28.2* 22.4*   HGB 11.5* 10.5*   HCT 38.6* 35.0*   * 465*   LYMPHOPCT 3*  --    MONOPCT 4  --      BMP:   Recent Labs     12/31/20  0745 01/01/21  0606   * 137   K 4.8 3.5*   CL 89* 104   CO2 25 25   BUN 25* 20   CREATININE 0.95 0.76   MG  --  1.9     Hepatic Function Panel: No results for input(s): PROT, LABALBU, BILIDIR, IBILI, BILITOT, ALKPHOS, ALT, AST in the last 72 hours. No results found for: PROCAL  Lab Results   Component Value Date    CRP 60.2 12/31/2020    CRP 67.5 12/26/2020    CRP 67.9 11/03/2020     Lab Results   Component Value Date    SEDRATE 70 (H) 12/31/2020         Lab Results   Component Value Date    DDIMER 0.39 06/29/2020    DDIMER 1.63 12/20/2017    DDIMER 0.68 12/25/2011     Lab Results   Component Value Date    FERRITIN 64 01/25/2014     No results found for: LDH  No results found for: FIBRINOGEN    Results in Past 30 Days  Result Component Current Result Ref Range Previous Result Ref Range   SARS-CoV-2      (12/31/2020)  Not in Time Range          (12/31/2020)       Not Detected (12/31/2020) Not Detected       Lab Results   Component Value Date    COVID19 Not Detected 12/31/2020    COVID19 Not Detected 11/10/2020    COVID19 Not Detected 08/23/2020    COVID19 Not Detected 07/29/2020       No results for input(s): VANCOTROUGH in the last 72 hours. Imaging Studies:   No new imaging    Cultures:   Results    Procedure Component Value Units Date/Time   Culture, Blood 1 [[id number]] Collected: 12/31/20 1502   Order Status: Completed Specimen: Blood Updated: 01/01/21 1223    Specimen Description . BLOOD    Special Requests 8ML LAC    Culture NO GROWTH 19 HOURS   Culture, Blood 1 [[id number]] Collected: 12/31/20 0753   Order Status: Completed Specimen: Blood Updated: 01/01/21 1223    Specimen Description . BLOOD    Special Requests 8ML LEFT AC    Culture NO GROWTH 1 DAY   Culture, Blood 1 [[id number]] Collected: 12/31/20 0745   Order Status: Completed Specimen: Blood Updated: 01/01/21 1223    Specimen Description . BLOOD    Special Requests 11ML LINE DRAW    Culture NO GROWTH 1 DAY   Culture, Anaerobic and Aerobic [[id number]] (Abnormal) Collected: 12/31/20 1907   Order Status: Completed Specimen: Foot Updated: 12/31/20 2248    Specimen Description . FOOT LEFT    Special Requests NOT REPORTED    Direct Exam NO NEUTROPHILS SEEN     RARE GRAM POSITIVE COCCI IN PAIRSAbnormal     Culture PENDING   Culture, Wound [[id number]] (Abnormal) Collected: 12/31/20 1240   Order Status: Completed Specimen: Foot Updated: 12/31/20 1554    Specimen Description . FOOT SWAB    Special Requests NOT REPORTED    Direct Exam RARE NEUTROPHILSAbnormal      RARE GRAM POSITIVE RODSAbnormal      RARE GRAM NEGATIVE RODSAbnormal     Culture PENDING         Medications:      neomycin-bacitracin-polymyxin   Topical BID    apixaban  5 mg Oral BID    metFORMIN  500 mg Oral BID WC    nortriptyline  50 mg Oral Nightly    sodium chloride flush  10 mL Intravenous 2 times per day    insulin lispro  0-18 Units Subcutaneous TID WC    insulin lispro  0-9 Units Subcutaneous Nightly    cefepime  2 g Intravenous Q12H    daptomycin (CUBICIN) IVPB  6 mg/kg Intravenous Q24H    insulin glargine  70 Units Subcutaneous Nightly    famotidine  20 mg Oral BID           [facility]  [address] [place] [address]  Perfect Serve messaging  OFFICE: [phone]      Electronically signed by [address] [place] [address], APRN - CNP on 1/1/2021 at 12:53 PM  Thank you for allowing us to participate in the care of this patient. Please call with questions.     This note iscreated with the assistance of a speech recognition program.  While intending to generate a document that actually reflects the content of the visit, the document can still have some

## 2021-01-01 NOTE — CARE COORDINATION
Social work; RN asking for PT/OT order. Will be needed for snf precert. For [facility]. Will need philip and Rx at discharge. Will do hens if snf needed.   Oral camacho

## 2021-01-01 NOTE — PROGRESS NOTES
Returned from PACU awake, alert. Left foot dressing/ace dry, intact.  Denies discomfort on arrival to unit, to water, milk on arrival to unit

## 2021-01-01 NOTE — OP NOTE
Operative Note      Patient: Harika Manzo  YOB: 1955  MRN: [id number]    Date of Procedure: 1/1/2021    Pre-Op Diagnosis:   1. Osteomyelitis left foot  2. Abscess and cellulitis left foot    Post-Op Diagnosis: Same       Procedure(s):  I&D LEFT FOOT WITH REMOVAL OF NONVIABLE BONE AND SOFT TISSUE    Surgeon(s):  Lois Hameed DPM    Assistant:   Resident: Martita Herring DPM    Anesthesia: Monitor Anesthesia Care    Estimated Blood Loss (mL): less than 50     Complications: None    Specimens:   ID Type Source Tests Collected by Time Destination   A : 5TH METATARSAL Tissue Toe [facility]2021 0943        Implants:  * No implants in log *      Drains: * No LDAs found *    Findings: abscess extending across plantar foot below level of fascia, soft necrotic 5th metatarsal    Detailed Description of Procedure:   Patient was brought into the operating room and placed on the table in supine position with a safety strap across the lap. Following IV sedation local anesthetic was infiltrated to the left ankle utilizing 10 cc of a one-to-one mix of 1% lidocaine plain and 0.5% Marcaine plain. A well-padded ankle tourniquet was applied. The foot was scrubbed prepped and draped in the usual aseptic technique. Timeout was performed to confirm the correct patient, procedure and extremity. Everyone was agreeable. Attention was directed to the left foot. Foot was not exsanguinated and tourniquet was not inflated. A large wound the lateral aspect of the foot was noted with exposed bone. Periskin was red, warm and swollen. Sharp excisional debridement of all necrotic and fibrotic tissue was performed down to and including level of bone. The distal aspect of the remaining fifth metatarsal bone was exposed. Bone was noted to be brown/gray in color and soft on palpation. Using a sagittal saw, the fifth metatarsal bone was resected.   The base of the fifth metatarsal was

## 2021-01-01 NOTE — PROGRESS NOTES
[facility] ID SERVICE: ONGOING CONSULTATION       Patient:  Parker Acevedo Sr., Date of birth 1970, Medical record number [id number]  Date of Visit:  January 7, 2019         Assessment and Recommendations:   Problem List:  Candidemia  - Chronic Malnutrition with TPN dependence via prior RUE PICC  - Celestino-en-Y Gastric Bypass  - Short Gut Syndrome    47yo M with PMH of Celestino-en-Y gastric bypass, esophagojejunostomy c/b short gut syndrome, and chronic malnutrition on TPN with recurrent bacteremia and was admitted for fevers, chills, and blood cultures positive for yeast from home health care blood draw from PICC line. Fungemia is likely related to PICC line as he has a previous history of recurrent port/PICC line infections. PICC line removed on 1/4, culture positive for candida.     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 Sensitivities pending on candida. Likely fluconazole sensitive, but until this is available, would continue Micafungin + fluconazole. Opho has requested fluconazole as this has better eye penetration.     Recommendations:  1. Please obtain 1 additional set of blood cultures to document clearance  2. Consider RONEL to further evaluate for endocarditis  3. Continue fluconazole for fungal chorioretinitis  4. Would restart micafungin until candida sensitivities return (in case of fluconazole resistance)    ID will continue to follow.           Attestation:  I have reviewed today's vital signs, medications, labs and imaging.  Mikaela Ma MD  Pager [phone]          Interval History:       Spoke with patient this am. No pain or discomfort.          Review of Systems:   CONSTITUTIONAL:  No fevers or chills  EYES: negative for icterus  ENT:  negative for oral lesions, hearing loss, tinnitus and sore throat  RESPIRATORY:  negative for cough and dyspnea  CARDIOVASCULAR:  negative for chest pain, palpitations  GASTROINTESTINAL:  negative for nausea, vomiting, diarrhea and  Admitted to the room from the ED; patient oriented to the room, call light, orders and safety and continues to ignore instruction and talk over the top of writer asking for multiple medications to be added to \"my list\". \"will answer data hx later\". constipation  GENITOURINARY:  negative for dysuria  HEME:  No easy bruising/bleeding  INTEGUMENT:  negative for rash and pruritus  NEURO:  Negative for headache         Current Antimicrobials   Fluconazole 400mg IV q24h         Physical Exam:   Ranges for vital signs:  Temp:  [96.8  F (36  C)-99  F (37.2  C)] 97.7  F (36.5  C)  Pulse:  [74] 74  Heart Rate:  [72-79] 72  Resp:  [16] 16  BP: (123-136)/(78-84) 136/79  SpO2:  [98 %-99 %] 99 %      Exam:  GENERAL:  well-developed, well-nourished, in no acute distress.   ENT:  Head is normocephalic, atraumatic. Oropharynx is moist without exudates or ulcers.  EYES:  Eyes have anicteric sclerae. PERRL.   NECK:  Supple. No cervical lymphadenopathy  LUNGS:  Clear to auscultation bilaterally. No wheezes or crackles.  CARDIOVASCULAR:  Regular rate and rhythm with no murmurs, gallops or rubs.  ABDOMEN:  Normal bowel sounds, soft, nontender. No hepatosplenomegaly.   EXT: Extremities warm and without edema.  SKIN:  No acute rashes.   NEUROLOGIC:  Grossly nonfocal.    ACCESS: PIV         Laboratory Data:     Creatinine   Date Value Ref Range Status   01/07/2019 0.67 0.66 - 1.25 mg/dL Final   01/06/2019 0.65 (L) 0.66 - 1.25 mg/dL Final   01/05/2019 0.64 (L) 0.66 - 1.25 mg/dL Final   01/04/2019 0.71 0.66 - 1.25 mg/dL Final   01/04/2019 0.85 0.66 - 1.25 mg/dL Final     WBC   Date Value Ref Range Status   01/07/2019 6.2 4.0 - 11.0 10e9/L Final   01/06/2019 5.7 4.0 - 11.0 10e9/L Final   01/05/2019 5.6 4.0 - 11.0 10e9/L Final   01/04/2019 4.5 4.0 - 11.0 10e9/L Final   01/04/2019 5.2 4.0 - 11.0 10e9/L Final     Hemoglobin   Date Value Ref Range Status   01/07/2019 13.0 (L) 13.3 - 17.7 g/dL Final     Platelet Count   Date Value Ref Range Status   01/07/2019 145 (L) 150 - 450 10e9/L Final     Sed Rate   Date Value Ref Range Status   01/23/2018 10 0 - 15 mm/h Final   01/20/2018 11 0 - 15 mm/h Final   09/24/2017 31 (H) 0 - 15 mm/h Final   06/28/2017 26 (H) 0 - 15 mm/h Final   03/12/2017 17  (H) 0 - 15 mm/h Final     CRP Inflammation   Date Value Ref Range Status   08/20/2018 <2.9 0.0 - 8.0 mg/L Final   06/28/2018 <2.9 0.0 - 8.0 mg/L Final   06/21/2018 <2.9 0.0 - 8.0 mg/L Final   06/02/2018 30.0 (H) 0.0 - 8.0 mg/L Final   06/01/2018 26.0 (H) 0.0 - 8.0 mg/L Final     AST   Date Value Ref Range Status   01/07/2019 18 0 - 45 U/L Final   01/05/2019 20 0 - 45 U/L Final   01/04/2019 16 0 - 45 U/L Final   01/02/2019 20 0 - 45 U/L Final   12/18/2018 18 0 - 45 U/L Final     ALT   Date Value Ref Range Status   01/07/2019 26 0 - 70 U/L Final   01/05/2019 26 0 - 70 U/L Final   01/04/2019 26 0 - 70 U/L Final   01/02/2019 22 0 - 70 U/L Final   12/18/2018 27 0 - 70 U/L Final     Bilirubin Total   Date Value Ref Range Status   01/07/2019 0.4 0.2 - 1.3 mg/dL Final   01/05/2019 0.4 0.2 - 1.3 mg/dL Final   01/04/2019 0.4 0.2 - 1.3 mg/dL Final   01/02/2019 0.5 0.2 - 1.3 mg/dL Final   12/18/2018 0.8 0.2 - 1.3 mg/dL Final     Lab Results   Component Value Date     01/07/2019    BUN 12 01/07/2019    CO2 27 01/07/2019       Culture data:  1/6 Blood cultures NGTD  1/4 PICC culture: Candida albicans      All cultures:  Recent Labs   Lab 01/06/19  0923 01/06/19  0922 01/04/19  1000 01/04/19  0250 01/04/19  0239 01/04/19  0238 01/02/19  1556 01/02/19  1548   CULT No growth after 21 hours No growth after 21 hours Single colony  Candida albicans / dubliniensis  Speciation in progress  *  Culture in progress <10,000 colonies/mL  urogenital deepa  Susceptibility testing not routinely done   Cultured on the 1st day of incubation:  Candida albicans  *  Critical Value/Significant Value, preliminary result only, called to and read back by   MARILOU HURST RN @1407 1/4/19. CT    Susceptibility testing done on previous specimen Cultured on the 2nd day of incubation:  Candida albicans  *  Critical Value/Significant Value, preliminary result only, called to and read back by  Valerie Su RN on 5b at 1047 on 1/5/19 ac.     Susceptibility testing done on previous specimen Cultured on the 3rd day of incubation:  Candida albicans  *  Critical Value/Significant Value, preliminary result only, called to and read back by  Perla Kim RN on 1.4.19 at 1649. bw    Susceptibility testing done on previous specimen Cultured on the 1st day of incubation:  Candida albicans  isolated  *  Critical Value/Significant Value, preliminary result only, called to and read back by  DAVID CRUZ RN 2237 1.3.19 NDP    Susceptibility testing in progress     Imaging:  MR L spine  1. No abnormal signal within the spine to suggest fungal infection.  Mild nonspecific enhancement and STIR hyperintensity in the right  posterior paraspinous muscles, potentially myositis.  2. Multilevel lumbar spondylosis.    TTE: No vegetation or mass identified, however this does not exclude endocarditis.  Global and regional left ventricular function is normal with an EF of 60-65%.  The right ventricle is normal size.  Global right ventricular function is normal.  No significant valvular dysfunction.

## 2021-01-02 LAB
ANION GAP SERPL CALCULATED.3IONS-SCNC: 6 MMOL/L (ref 9–17)
BUN BLDV-MCNC: 14 MG/DL (ref 8–23)
BUN/CREAT BLD: 23 (ref 9–20)
C-REACTIVE PROTEIN: 110.8 MG/L (ref 0–5)
CALCIUM IONIZED: 1.25 MMOL/L (ref 1.13–1.33)
CALCIUM SERPL-MCNC: 8.2 MG/DL (ref 8.6–10.4)
CHLORIDE BLD-SCNC: 104 MMOL/L (ref 98–107)
CO2: 24 MMOL/L (ref 20–31)
CREAT SERPL-MCNC: 0.6 MG/DL (ref 0.7–1.2)
CULTURE: ABNORMAL
DIRECT EXAM: ABNORMAL
GFR AFRICAN AMERICAN: >60 ML/MIN
GFR NON-AFRICAN AMERICAN: >60 ML/MIN
GFR SERPL CREATININE-BSD FRML MDRD: ABNORMAL ML/MIN/{1.73_M2}
GFR SERPL CREATININE-BSD FRML MDRD: ABNORMAL ML/MIN/{1.73_M2}
GLUCOSE BLD-MCNC: 159 MG/DL (ref 75–110)
GLUCOSE BLD-MCNC: 176 MG/DL (ref 75–110)
GLUCOSE BLD-MCNC: 187 MG/DL (ref 75–110)
GLUCOSE BLD-MCNC: 92 MG/DL (ref 70–99)
GLUCOSE BLD-MCNC: 94 MG/DL (ref 75–110)
HCT VFR BLD CALC: 33.4 % (ref 40.7–50.3)
HEMOGLOBIN: 9.9 G/DL (ref 13–17)
LACTIC ACID, SEPSIS WHOLE BLOOD: NORMAL MMOL/L (ref 0.5–1.9)
LACTIC ACID, SEPSIS WHOLE BLOOD: NORMAL MMOL/L (ref 0.5–1.9)
LACTIC ACID, SEPSIS: 0.8 MMOL/L (ref 0.5–1.9)
LACTIC ACID, SEPSIS: 1 MMOL/L (ref 0.5–1.9)
Lab: ABNORMAL
Lab: ABNORMAL
MCH RBC QN AUTO: 26.3 PG (ref 25.2–33.5)
MCHC RBC AUTO-ENTMCNC: 29.6 G/DL (ref 28.4–34.8)
MCV RBC AUTO: 88.6 FL (ref 82.6–102.9)
NRBC AUTOMATED: 0 PER 100 WBC
PDW BLD-RTO: 15.6 % (ref 11.8–14.4)
PLATELET # BLD: 421 K/UL (ref 138–453)
PMV BLD AUTO: 9.3 FL (ref 8.1–13.5)
POTASSIUM SERPL-SCNC: 3.9 MMOL/L (ref 3.7–5.3)
RBC # BLD: 3.77 M/UL (ref 4.21–5.77)
SODIUM BLD-SCNC: 134 MMOL/L (ref 135–144)
SPECIMEN DESCRIPTION: ABNORMAL
SPECIMEN DESCRIPTION: ABNORMAL
WBC # BLD: 17 K/UL (ref 3.5–11.3)

## 2021-01-02 PROCEDURE — 6370000000 HC RX 637 (ALT 250 FOR IP): Performed by: STUDENT IN AN ORGANIZED HEALTH CARE EDUCATION/TRAINING PROGRAM

## 2021-01-02 PROCEDURE — 6360000002 HC RX W HCPCS: Performed by: STUDENT IN AN ORGANIZED HEALTH CARE EDUCATION/TRAINING PROGRAM

## 2021-01-02 PROCEDURE — 99232 SBSQ HOSP IP/OBS MODERATE 35: CPT | Performed by: INTERNAL MEDICINE

## 2021-01-02 PROCEDURE — 80048 BASIC METABOLIC PNL TOTAL CA: CPT

## 2021-01-02 PROCEDURE — 6370000000 HC RX 637 (ALT 250 FOR IP): Performed by: NURSE PRACTITIONER

## 2021-01-02 PROCEDURE — 83605 ASSAY OF LACTIC ACID: CPT

## 2021-01-02 PROCEDURE — 2500000003 HC RX 250 WO HCPCS: Performed by: NURSE PRACTITIONER

## 2021-01-02 PROCEDURE — 99232 SBSQ HOSP IP/OBS MODERATE 35: CPT | Performed by: NURSE PRACTITIONER

## 2021-01-02 PROCEDURE — 82947 ASSAY GLUCOSE BLOOD QUANT: CPT

## 2021-01-02 PROCEDURE — 93923 UPR/LXTR ART STDY 3+ LVLS: CPT

## 2021-01-02 PROCEDURE — 82330 ASSAY OF CALCIUM: CPT

## 2021-01-02 PROCEDURE — 85027 COMPLETE CBC AUTOMATED: CPT

## 2021-01-02 PROCEDURE — 36415 COLL VENOUS BLD VENIPUNCTURE: CPT

## 2021-01-02 PROCEDURE — 2580000003 HC RX 258: Performed by: STUDENT IN AN ORGANIZED HEALTH CARE EDUCATION/TRAINING PROGRAM

## 2021-01-02 PROCEDURE — 86140 C-REACTIVE PROTEIN: CPT

## 2021-01-02 PROCEDURE — 1200000000 HC SEMI PRIVATE

## 2021-01-02 RX ORDER — METOPROLOL TARTRATE 5 MG/5ML
5 INJECTION INTRAVENOUS EVERY 4 HOURS PRN
Status: DISCONTINUED | OUTPATIENT
Start: 2021-01-02 | End: 2021-01-11 | Stop reason: HOSPADM

## 2021-01-02 RX ORDER — SODIUM HYPOCHLORITE 1.25 MG/ML
SOLUTION TOPICAL DAILY
Status: DISCONTINUED | OUTPATIENT
Start: 2021-01-02 | End: 2021-01-11 | Stop reason: HOSPADM

## 2021-01-02 RX ORDER — CALCIUM CARBONATE 200(500)MG
500 TABLET,CHEWABLE ORAL 3 TIMES DAILY PRN
Status: DISCONTINUED | OUTPATIENT
Start: 2021-01-02 | End: 2021-01-11 | Stop reason: HOSPADM

## 2021-01-02 RX ADMIN — ONDANSETRON 4 MG: 2 INJECTION INTRAMUSCULAR; INTRAVENOUS at 04:14

## 2021-01-02 RX ADMIN — POLYMYXIN B SULFATE, BACITRACIN ZINC, NEOMYCIN SULFATE: 5000; 3.5; 4 OINTMENT TOPICAL at 08:05

## 2021-01-02 RX ADMIN — MORPHINE SULFATE 4 MG: 4 INJECTION, SOLUTION INTRAMUSCULAR; INTRAVENOUS at 13:29

## 2021-01-02 RX ADMIN — METFORMIN HYDROCHLORIDE 500 MG: 500 TABLET ORAL at 08:04

## 2021-01-02 RX ADMIN — ANTACID TABLETS 500 MG: 500 TABLET, CHEWABLE ORAL at 13:29

## 2021-01-02 RX ADMIN — APIXABAN 5 MG: 5 TABLET, FILM COATED ORAL at 21:45

## 2021-01-02 RX ADMIN — CEFEPIME HYDROCHLORIDE 2 G: 2 INJECTION, POWDER, FOR SOLUTION INTRAVENOUS at 03:17

## 2021-01-02 RX ADMIN — INSULIN LISPRO 2 UNITS: 100 INJECTION, SOLUTION INTRAVENOUS; SUBCUTANEOUS at 21:45

## 2021-01-02 RX ADMIN — DAPTOMYCIN 400 MG: 500 INJECTION, POWDER, LYOPHILIZED, FOR SOLUTION INTRAVENOUS at 15:49

## 2021-01-02 RX ADMIN — MORPHINE SULFATE 4 MG: 4 INJECTION, SOLUTION INTRAMUSCULAR; INTRAVENOUS at 03:17

## 2021-01-02 RX ADMIN — APIXABAN 5 MG: 5 TABLET, FILM COATED ORAL at 08:04

## 2021-01-02 RX ADMIN — OXYCODONE AND ACETAMINOPHEN 2 TABLET: 5; 325 TABLET ORAL at 17:19

## 2021-01-02 RX ADMIN — INSULIN LISPRO 3 UNITS: 100 INJECTION, SOLUTION INTRAVENOUS; SUBCUTANEOUS at 13:29

## 2021-01-02 RX ADMIN — SODIUM CHLORIDE: 9 INJECTION, SOLUTION INTRAVENOUS at 03:19

## 2021-01-02 RX ADMIN — METOPROLOL TARTRATE 5 MG: 5 INJECTION INTRAVENOUS at 21:46

## 2021-01-02 RX ADMIN — METFORMIN HYDROCHLORIDE 500 MG: 500 TABLET ORAL at 17:19

## 2021-01-02 RX ADMIN — NORTRIPTYLINE HYDROCHLORIDE 50 MG: 25 CAPSULE ORAL at 21:45

## 2021-01-02 RX ADMIN — MORPHINE SULFATE 4 MG: 4 INJECTION, SOLUTION INTRAMUSCULAR; INTRAVENOUS at 00:09

## 2021-01-02 RX ADMIN — MORPHINE SULFATE 4 MG: 4 INJECTION, SOLUTION INTRAMUSCULAR; INTRAVENOUS at 19:59

## 2021-01-02 RX ADMIN — FAMOTIDINE 20 MG: 20 TABLET, FILM COATED ORAL at 21:45

## 2021-01-02 RX ADMIN — FAMOTIDINE 20 MG: 20 TABLET, FILM COATED ORAL at 08:03

## 2021-01-02 RX ADMIN — CEFEPIME HYDROCHLORIDE 2 G: 2 INJECTION, POWDER, FOR SOLUTION INTRAVENOUS at 13:28

## 2021-01-02 RX ADMIN — INSULIN LISPRO 3 UNITS: 100 INJECTION, SOLUTION INTRAVENOUS; SUBCUTANEOUS at 17:19

## 2021-01-02 RX ADMIN — MORPHINE SULFATE 4 MG: 4 INJECTION, SOLUTION INTRAMUSCULAR; INTRAVENOUS at 22:34

## 2021-01-02 RX ADMIN — OXYCODONE AND ACETAMINOPHEN 2 TABLET: 5; 325 TABLET ORAL at 08:03

## 2021-01-02 RX ADMIN — POLYMYXIN B SULFATE, BACITRACIN ZINC, NEOMYCIN SULFATE: 5000; 3.5; 4 OINTMENT TOPICAL at 21:51

## 2021-01-02 RX ADMIN — ANTACID TABLETS 500 MG: 500 TABLET, CHEWABLE ORAL at 21:45

## 2021-01-02 RX ADMIN — INSULIN GLARGINE 70 UNITS: 100 INJECTION, SOLUTION SUBCUTANEOUS at 21:45

## 2021-01-02 RX ADMIN — MORPHINE SULFATE 2 MG: 2 INJECTION, SOLUTION INTRAMUSCULAR; INTRAVENOUS at 09:56

## 2021-01-02 RX ADMIN — SODIUM CHLORIDE: 9 INJECTION, SOLUTION INTRAVENOUS at 17:20

## 2021-01-02 ASSESSMENT — ENCOUNTER SYMPTOMS
SHORTNESS OF BREATH: 0
NAUSEA: 0
COUGH: 1
RESPIRATORY NEGATIVE: 1
GASTROINTESTINAL NEGATIVE: 1
VOMITING: 0
ABDOMINAL PAIN: 0

## 2021-01-02 ASSESSMENT — PAIN SCALES - GENERAL
PAINLEVEL_OUTOF10: 7
PAINLEVEL_OUTOF10: 7
PAINLEVEL_OUTOF10: 0
PAINLEVEL_OUTOF10: 8
PAINLEVEL_OUTOF10: 7
PAINLEVEL_OUTOF10: 7

## 2021-01-02 ASSESSMENT — PAIN DESCRIPTION - DESCRIPTORS
DESCRIPTORS: SHARP
DESCRIPTORS: SHARP

## 2021-01-02 ASSESSMENT — PAIN DESCRIPTION - LOCATION
LOCATION: FOOT

## 2021-01-02 ASSESSMENT — PAIN DESCRIPTION - FREQUENCY: FREQUENCY: CONTINUOUS

## 2021-01-02 ASSESSMENT — PAIN DESCRIPTION - PAIN TYPE: TYPE: SURGICAL PAIN

## 2021-01-02 ASSESSMENT — PAIN DESCRIPTION - ONSET
ONSET: ON-GOING
ONSET: ON-GOING

## 2021-01-02 ASSESSMENT — PAIN DESCRIPTION - ORIENTATION: ORIENTATION: LEFT
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
## 2021-01-02 NOTE — PLAN OF CARE
Problem: SAFETY  Goal: Free from accidental physical injury  1/2/2021 0140 by Cassia Robles RN  Outcome: Ongoing     Problem: Serum Glucose Level - Abnormal:  Goal: Ability to maintain appropriate glucose levels will improve  Description: Ability to maintain appropriate glucose levels will improve  1/2/2021 0140 by Cassia Robles RN  Outcome: Ongoing     Problem: SKIN INTEGRITY  Goal: Skin integrity is maintained or improved  Outcome: Ongoing     Problem: PAIN  Goal: Patient's pain/discomfort is manageable  1/2/2021 0140 by Cassia Robles RN  Outcome: Ongoing

## 2021-01-02 NOTE — PROGRESS NOTES
[facility]  Progress Note    Josseline Horvath  MRN: [id number]  Date: 1/2/2021  LOS: 2     Reason for F/U :   Left transmetatarsal amputation stump infection    Impression :   1. Left transmetatarsal amputation stump site infection with underlying osteomyelitis of the first and fifth metatarsals  2. Leukocytosis secondary to above, improving  3. 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    Recommendations:   · Patient continues on cefepime and daptomycin  · Previous cultures grew VRE, Proteus mirabilis, coagulase-negative Staphylococcus in August 2020  · Patient underwent incision and drainage of the left foot with removal of nonviable bone and soft tissue  · Blood cultures remain no growth to date  · Superficial cultures thus far with group B beta-hemolytic strep, lactose fermenting gram-negative rods and gram-positive cocci in pairs   · Await culture and pathology    Infection Control Recommendations:   Contact precautions    Discharge Planning:   Estimated Length of IV antimicrobials: To be determined  Patient will need Midline Catheter Insertion/ PICC line Insertion: No  Patient will need: Home IV , [place],  SNF,  LTAC: Undetermined  Patient willneed outpatient wound care: No    Medical Decision making / Summary of Stay:   Josseline Horvath is a 63y.o.-year-old male who was initially admitted on 12/31/2020. Patient      Patient is known to our practice for multiple hospitalizations since July 2020. He was initially admitted July 28, 2020 for left hallux gangrene/left foot cellulitis and plantar ulcerations. He underwent left superficial femoral, popliteal, tibial, peroneal trunk, and posterior tibial artery atherectomy/balloon angioplasty 7/29/2020. Patient underwent left TMA with Achilles tendon lengthening 8/5/2020.   Patient was discharged 8/7/2020 to rehab on doxycycline and Augmentin x1 week.     8/23/2020 patient returned to [facility] saying he never received wound care. He was found to have wound dehiscence with concern for underlying osteomyelitis. 8/24/2020 he underwent incision and drainage of the left foot with revision of left foot TMA. At that time, cultures grew VRE, Proteus mirabilis, and coagulase-negative Staphylococcus. He was discharged on oral ciprofloxacin and linezolid through 9/25/2020 to complete a 4-week course.     11/3/2020 patient returned to the emergency department due to drainage from his foot. Patient was found to have TMA stump infection with underlying osteomyelitis of the metatarsals, first and fifth metatarsals did probe to the bone. At that time, we did discuss with podiatry as well as the patient that he would likely need to undergo left BKA; however, they both opted to treat patient with 6 weeks of IV antibiotics. He was discharged on 11/12/2020 on IV cefepime and linezolid to an extended care facility with plans to continue through 12/15/2020.  11/19/2020 patient left skilled nursing facility AMA. IV cefepime was stopped at that time. Patient continued on oral Zyvox. 11/23/2020 patient saw Dr. Lavone Ormond in the office and patient was instructed to complete the oral linezolid. Patient did follow-up again in the office 12/14/2020 and wound was smaller without any overt signs of infection at that time.     12/26/2020 patient presented to [facility] due to falling and having increasing pain and redness in the left foot. WBC 16,900, CRP 67.5. The wound probed to the bone at that time. It was recommended for patient to be admitted, undergo debridement, and receive IV antibiotics. Patient refused. He was prescribed doxycycline 100 mg p.o. twice a day x10 days and patient signed out [address]     Patient returned to the emergency department today due to pain in the left foot. He does tell me that he attempted to go to his podiatrist at the beginning of the week but could not get in.   Upon arrival to the emergency department, it was noted that he had the same dressing on his foot that was placed in the emergency department last week. He did rate pain 7 out of 10, throbbing and constant. He has not had any documented fevers but does have hot and cold flashes. Patient does not really have any other complaints aside from wanting pain medication.     Patient has been started on vancomycin and Zosyn. We have been consulted to assist with antimicrobial therapy management. 2021 incision and drainage of the left foot with removal of nonviable bone and soft tissue    Current evaluation:2021    BP (!) 121/59   Pulse 93   Temp 98.8 °F (37.1 °C) (Oral)   Resp 16   Ht 6' 1\" (1.854 m)   Wt 154 lb 11.2 oz (70.2 kg)   SpO2 96%   BMI 20.41 kg/m²     Temperature Range: Temp: 98.8 °F (37.1 °C) Temp  Av °F (36.7 °C)  Min: 97 °F (36.1 °C)  Max: 98.8 °F (37.1 °C)    Patient seen and evaluated lying in bed. Denies any needs  States podiatry is doing his dressing changes  Tells me to keep him here as long as we need to in order to get him better    Review of Systems   Constitutional: Negative. HENT: Negative. Respiratory: Negative. Cardiovascular: Negative. Gastrointestinal: Negative. Genitourinary: Negative. Musculoskeletal:        Pain in lle   Skin: Negative. Neurological: Negative. Hematological: Negative. Psychiatric/Behavioral: Negative. Physical Examination :     Physical Exam  Constitutional:       General: He is not in acute distress. Appearance: Normal appearance. He is normal weight. HENT:      Head: Normocephalic and atraumatic. Pulmonary:      Effort: Pulmonary effort is normal. No respiratory distress. Abdominal:      General: Abdomen is flat. There is no distension. Musculoskeletal:      Comments: Right lower extremity BKA.   Left lower extremity with multiple abrasions/scab/eschar with surgical dressing on stump, not removed   Skin:     General: Skin is warm and dry. Neurological:      General: No focal deficit present. Mental Status: He is alert and oriented to person, place, and time. Mental status is at baseline. Psychiatric:         Mood and Affect: Mood normal.         Behavior: Behavior normal.         Laboratory data:   I have independently reviewed the followinglabs:  CBC with Differential:   Recent Labs     12/31/20  0745 01/01/21  0606 01/02/21  0700   WBC 28.2* 22.4* 17.0*   HGB 11.5* 10.5* 9.9*   HCT 38.6* 35.0* 33.4*   * 465* 421   LYMPHOPCT 3*  --   --    MONOPCT 4  --   --      BMP:   Recent Labs     01/01/21  0606 01/02/21  0700    134*   K 3.5* 3.9    104   CO2 25 24   BUN 20 14   CREATININE 0.76 0.60*   MG 1.9  --      Hepatic Function Panel: No results for input(s): PROT, LABALBU, BILIDIR, IBILI, BILITOT, ALKPHOS, ALT, AST in the last 72 hours. No results found for: PROCAL  Lab Results   Component Value Date    CRP 60.2 12/31/2020    CRP 67.5 12/26/2020    CRP 67.9 11/03/2020     Lab Results   Component Value Date    SEDRATE 70 (H) 12/31/2020         Lab Results   Component Value Date    DDIMER 0.39 06/29/2020    DDIMER 1.63 12/20/2017    DDIMER 0.68 12/25/2011     Lab Results   Component Value Date    FERRITIN 64 01/25/2014     No results found for: LDH  No results found for: FIBRINOGEN    Results in Past 30 Days  Result Component Current Result Ref Range Previous Result Ref Range   SARS-CoV-2      (12/31/2020)  Not in Time Range          (12/31/2020)       Not Detected (12/31/2020) Not Detected       Lab Results   Component Value Date    COVID19 Not Detected 12/31/2020    COVID19 Not Detected 11/10/2020    COVID19 Not Detected 08/23/2020    COVID19 Not Detected 07/29/2020       No results for input(s): VANCOTROUGH in the last 72 hours.     Imaging Studies:   No new imaging    Cultures:   Results  1/1/2021  8:56 PM - [place] Incoming Lab Results From [facility]    Specimen Information: Foot     Component Collected Lab   Specimen Description 12/31/2020  7:07  [facility]   . FOOT LEFT    Special Requests 12/31/2020  7:07  [facility]   NOT REPORTED    Direct Exam 12/31/2020  7:07  [address]   NO NEUTROPHILS SEEN    Direct Exam Abnormal  12/31/2020  7:07 PM [address] [place] COCCI IN PAIRS    Culture Abnormal  12/31/2020  7:07 PM [address], BETA HEMOLYTIC GROUP B LIGHT GROWTH    Culture Abnormal  12/31/2020  7:07 PM [address] NEGATIVE RODS LIGHT GROWTH    Testing Performed By    Lab - Abbreviation Name Director Address Valid Date Range   [address], MD [address] [place] [address] 08/30/17 0801-Present   [facility] [address] LAB      1/2/2021  9:40 AM - Anika Hamilton Incoming Lab Results From [facility]    Specimen Information: Foot        Component Collected Lab   Specimen Description 12/31/2020 12:40  [facility]   . FOOT SWAB    Special Requests 12/31/2020 12:40  [facility]   NOT REPORTED    Direct Exam Abnormal  12/31/2020 12:40 PM [address]    Direct Exam Abnormal  12/31/2020 12:40  [address]   RARE GRAM POSITIVE RODS    Direct Exam Abnormal  12/31/2020 12:40  [address]   RARE GRAM NEGATIVE RODS    Culture Abnormal  12/31/2020 12:40  [address]   NORMAL SKIN ANNA MARIE    Culture Abnormal  12/31/2020 12:40 PM [address], BETA HEMOLYTIC GROUP B LIGHT GROWTH    Testing Performed By    Lab - Abbreviation Name Director Address Valid Date Range   [address], MD [address] [facility] [address] 08/30/17 0801-Present   [address] [address] LAB Caitlyn Garg MD [address] [facility] [address] [address] 08/30/17 0757-Present   Lab and Collection    Culture, Wound - 12/31/2020      Culture, Blood 1 [[id number]] Collected: 12/31/20 1502   Order Status: Completed Specimen: Blood Updated: 01/02/21 0734    Specimen Description . BLOOD    Special Requests 8ML LAC    Culture NO GROWTH 2 DAYS   Culture, Blood 1 [[id number]] Collected: 12/31/20 0745   Order Status: Completed Specimen: Blood Updated: 01/02/21 0734    Specimen Description . BLOOD    Special Requests 11ML LINE DRAW    Culture NO GROWTH 2 DAYS   Culture, Blood 1 [[id number]] Collected: 12/31/20 0753   Order Status: Completed Specimen: Blood Updated: 01/02/21 0734    Specimen Description . BLOOD    Special Requests 8ML LEFT AC    Culture NO GROWTH 2 DAYS         Medications:      neomycin-bacitracin-polymyxin   Topical BID    apixaban  5 mg Oral BID    metFORMIN  500 mg Oral BID WC    nortriptyline  50 mg Oral Nightly    sodium chloride flush  10 mL Intravenous 2 times per day    insulin lispro  0-18 Units Subcutaneous TID WC    insulin lispro  0-9 Units Subcutaneous Nightly    cefepime  2 g Intravenous Q12H    daptomycin (CUBICIN) IVPB  6 mg/kg Intravenous Q24H    insulin glargine  70 Units Subcutaneous Nightly    famotidine  20 mg Oral BID           [facility]  [address] [place] [address]  Perfect Serve messaging  OFFICE: [phone]      Electronically signed by [address] [place] [address], APRN - CNP on 1/2/2021 at 9:52 AM  Thank you for allowing us to participate in the care of this patient. Please call with questions. This note iscreated with the assistance of a speech recognition program.  While intending to generate a document that actually reflects the content of the visit, the document can still have some errors including those of syntax andsound a like substitutions which may escape proof reading.   In such instances, actual meaning can be extrapolated by contextual diversion.

## 2021-01-02 NOTE — PROGRESS NOTES
Progress Note  Podiatric Medicine and Surgery     Subjective     CC: Left foot infection    Patient seen and examined at bedside. Patient expresses feeling much improved. S/P I&D of left foot (DOS 1/1/2021)  No acute events overnight. Afebrile, vital signs stable   Leukocytosis improving 28.2>22.4>17.0    HPI :  Dorina Cardenas is a 63 y. o. male seen at [facility] the floor for a wound on the left foot. The patient was last seen in the emergency department at [facility] [address] on 12/26/2020, at which time patient refused to be admitted so he was given a follow-up for podiatry for 12/28/2020, an appointment which patient missed. The patient is well known to Dr. Severiano Burnet who performed an I&D and a revision of TMA 8/24/2020 of left lower extremity. Patient is also well known to Dr. Roosevelt Holter. Patient states he has been compliant with the antibiotic prescription given at his last emergency department visit. 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 Patient states he wears a diabetic shoe to the left lower extremity when ambulating. Of note, patient has a prior BKA to [facility] and reports he has fallen numerous times over the last week because of issues with his right lower extremity prosthetic. Patient denies hitting his head, losing consciousness or suffering any trauma with the falls. Patient admits to smoking on & off, up to 1 pack per day when smoking heavily. The patient denies any other pedal complaints at this time.     PCP is Elina Cedillo DO    ROS: Denies N/V/F/C/SOB/CP. Otherwise negative except at stated in the HPI.      Medications:  Scheduled Meds:   neomycin-bacitracin-polymyxin   Topical BID    apixaban  5 mg Oral BID    metFORMIN  500 mg Oral BID WC    nortriptyline  50 mg Oral Nightly    sodium chloride flush  10 mL Intravenous 2 times per day    insulin lispro  0-18 Units Subcutaneous TID WC    insulin lispro  0-9 Units Subcutaneous Recent Labs     12/31/20  0745   CRP 60.2*       Left Lower Extremity Physical Exam:     Vascular: DP and PT pulses are Non-palpable, PT and AT audible on doppler.  CFT <3 seconds to dorsum of foot.  Hair growth is absent to the foot.  Moderate non-pitting edema to the left lower extremity.       Neuro: Saph/sural/SP/DP/plantar sensation diminished to light touch.     Musculoskeletal: Muscle strength is 4/5 against resistance to lower extremity muscle groups. Gross deformity is present, right LE BKA & left LE TMA. TTP absent to left foot, ankle, and calf.      Dermatologic:  Ulcer #1 located level of 5th metatarsal TMA site and measures approximately 5.0cm x 3.0 x 4.5cm deep. the wound base is granular. Periwound skin with some mild erythema expected for post-op status.  Approximately 1 cc of candido-purulent drainage expressed with no associated mal odor. Mild erythema present to dorso-lateral foot below level of ankle with associated mild increase in warmth. Per patient, this is greatly improved since yesterday. Does probe to bone. No crepitus or induration pre- or post-debridement.      Ulcer #2 located level of first metatarsal TMA site, distally and measures approximately 0.4 cm x 04 cm x 0.2 cm. The wound base is fibrogranular. Periwound skin is hyperkeratotic. No drainage with no associated increase in warmth. Mild periwound erythema. Does not probe to bone. Does not sinus track, undermine, or have fluctuance. No crepitus or induration.     Superficial, dry, intact eschar present to dorsal foot and medial foot. No drainage noted, does not probe. No fluctuance, crepitus, or induration noted.  Minimal associated erythema with minimal increase in warmth.     Clinical Images: 12/31/2020           Imaging:   XR FOOT LEFT (MIN 3 VIEWS)   Final Result   Ulceration about the remaining 5th metatarsal appears larger in the interval.   While the underlying bone appears unchanged in the interval, osteomyelitis is   not excluded. No soft tissue gas identified. VL LOWER EXTREMITY ARTERIAL SEGMENTAL PRESSURES W PPG    (Results Pending)       Cultures:   Culture, Anaerobic and Aerobic [[id number]] (Abnormal) Collected: 12/31/20 1907   Order Status: Completed Specimen: Foot Updated: 12/31/20 2248    Specimen Description . FOOT LEFT    Special Requests NOT REPORTED    Direct Exam NO NEUTROPHILS SEEN     RARE GRAM POSITIVE COCCI IN PAIRSAbnormal     Culture PENDING   Culture, Wound [[id number]] (Abnormal) Collected: 12/31/20 1240   Order Status: Completed Specimen: Foot Updated: 12/31/20 1554    Specimen Description . FOOT SWAB    Special Requests NOT REPORTED    Direct Exam RARE NEUTROPHILSAbnormal      RARE GRAM POSITIVE RODSAbnormal      RARE GRAM NEGATIVE RODSAbnormal     Culture PENDING          Assessment   [place] is a 63 y.o. male with   1. Ulcer to level of bone, left foot  2. Cellulitis, Left LE  3. S/p revision of TMA & I&D (DOS: 8/24/2020), LLE  4. 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 Previous BKA, RLE   6. COPD  7. CAD    Principal Problem:    Diabetic ulcer of left midfoot associated with type 2 diabetes mellitus, with fat layer exposed ([address]  Active Problems:    DM type 2 with diabetic peripheral neuropathy (HCC)    Chronic obstructive pulmonary disease (HCC)    HLD (hyperlipidemia)    Gastroesophageal reflux disease    Peripheral artery disease (HCC)    COPD exacerbation (HCC)    Below-knee amputation of right lower extremity (HCC)    Essential hypertension    Diabetic foot infection ([address]    Diabetic infection of left foot (HCC)    Hypocalcemia    Hypokalemia  Resolved Problems:    * No resolved hospital problems. *       Plan     · Patient examined and evaluated at bedside. · Treatment options discussed in detail with the patient. · Educated patient on the importance of smoking cessation to allow for optimal healing.    · Educated patient on the importance of tight glycemic

## 2021-01-02 NOTE — PLAN OF CARE
Problem: Discharge Planning:  Goal: Discharged to appropriate level of care  Description: Discharged to appropriate level of care  1/2/2021 1434 by Dex Holcomb RN  Outcome: Ongoing     Problem: Serum Glucose Level - Abnormal:  Goal: Ability to maintain appropriate glucose levels will improve  Description: Ability to maintain appropriate glucose levels will improve  1/2/2021 1434 by Dex Holcomb RN  Outcome: Ongoing     Problem: Sensory Perception - Impaired:  Goal: Ability to maintain a stable neurologic state will improve  Description: Ability to maintain a stable neurologic state will improve  1/2/2021 1434 by Dex Holcomb RN  Outcome: Ongoing     Problem: SAFETY  Goal: Free from accidental physical injury  1/2/2021 1434 by Dex Holcomb RN  Outcome: Ongoing     Problem: SAFETY  Goal: Free from intentional harm  1/2/2021 1434 by Dex Holcomb RN  Outcome: Ongoing     Problem: PAIN  Goal: Patient's pain/discomfort is manageable  1/2/2021 1434 by Dex Holcomb RN  Outcome: Ongoing     Problem: SKIN INTEGRITY  Goal: Skin integrity is maintained or improved  1/2/2021 1434 by Dex Holcomb RN  Outcome: Ongoing     Problem: KNOWLEDGE DEFICIT  Goal: Patient/S.O. demonstrates understanding of disease process, treatment plan, medications, and discharge instructions.   1/2/2021 1434 by Dex Holcomb RN  Outcome: Ongoing     Problem: DISCHARGE BARRIERS  Goal: Patient's continuum of care needs are met  1/2/2021 1434 by Dex Holcomb RN  Outcome: Ongoing     Problem: Skin Integrity:  Goal: Absence of new skin breakdown  Description: Absence of new skin breakdown  1/2/2021 1434 by Dex Holcomb RN  Outcome: Ongoing

## 2021-01-03 LAB
ANION GAP SERPL CALCULATED.3IONS-SCNC: 8 MMOL/L (ref 9–17)
BUN BLDV-MCNC: 14 MG/DL (ref 8–23)
BUN/CREAT BLD: 17 (ref 9–20)
CALCIUM SERPL-MCNC: 8.4 MG/DL (ref 8.6–10.4)
CHLORIDE BLD-SCNC: 103 MMOL/L (ref 98–107)
CO2: 23 MMOL/L (ref 20–31)
CREAT SERPL-MCNC: 0.83 MG/DL (ref 0.7–1.2)
GFR AFRICAN AMERICAN: >60 ML/MIN
GFR NON-AFRICAN AMERICAN: >60 ML/MIN
GFR SERPL CREATININE-BSD FRML MDRD: ABNORMAL ML/MIN/{1.73_M2}
GFR SERPL CREATININE-BSD FRML MDRD: ABNORMAL ML/MIN/{1.73_M2}
GLUCOSE BLD-MCNC: 113 MG/DL (ref 75–110)
GLUCOSE BLD-MCNC: 162 MG/DL (ref 70–99)
GLUCOSE BLD-MCNC: 188 MG/DL (ref 75–110)
GLUCOSE BLD-MCNC: 201 MG/DL (ref 75–110)
GLUCOSE BLD-MCNC: 365 MG/DL (ref 75–110)
GLUCOSE BLD-MCNC: 63 MG/DL (ref 75–110)
HCT VFR BLD CALC: 35.1 % (ref 40.7–50.3)
HEMOGLOBIN: 10.3 G/DL (ref 13–17)
MCH RBC QN AUTO: 26.3 PG (ref 25.2–33.5)
MCHC RBC AUTO-ENTMCNC: 29.3 G/DL (ref 28.4–34.8)
MCV RBC AUTO: 89.8 FL (ref 82.6–102.9)
NRBC AUTOMATED: 0 PER 100 WBC
PDW BLD-RTO: 15.6 % (ref 11.8–14.4)
PLATELET # BLD: 407 K/UL (ref 138–453)
PMV BLD AUTO: 9.4 FL (ref 8.1–13.5)
POTASSIUM SERPL-SCNC: 4.2 MMOL/L (ref 3.7–5.3)
RBC # BLD: 3.91 M/UL (ref 4.21–5.77)
SODIUM BLD-SCNC: 134 MMOL/L (ref 135–144)
WBC # BLD: 12.6 K/UL (ref 3.5–11.3)

## 2021-01-03 PROCEDURE — 82947 ASSAY GLUCOSE BLOOD QUANT: CPT

## 2021-01-03 PROCEDURE — 85027 COMPLETE CBC AUTOMATED: CPT

## 2021-01-03 PROCEDURE — 99232 SBSQ HOSP IP/OBS MODERATE 35: CPT | Performed by: INTERNAL MEDICINE

## 2021-01-03 PROCEDURE — 6370000000 HC RX 637 (ALT 250 FOR IP): Performed by: INTERNAL MEDICINE

## 2021-01-03 PROCEDURE — 6370000000 HC RX 637 (ALT 250 FOR IP): Performed by: NURSE PRACTITIONER

## 2021-01-03 PROCEDURE — 36415 COLL VENOUS BLD VENIPUNCTURE: CPT

## 2021-01-03 PROCEDURE — 1200000000 HC SEMI PRIVATE

## 2021-01-03 PROCEDURE — 99232 SBSQ HOSP IP/OBS MODERATE 35: CPT | Performed by: NURSE PRACTITIONER

## 2021-01-03 PROCEDURE — 6360000002 HC RX W HCPCS: Performed by: STUDENT IN AN ORGANIZED HEALTH CARE EDUCATION/TRAINING PROGRAM

## 2021-01-03 PROCEDURE — 2580000003 HC RX 258: Performed by: STUDENT IN AN ORGANIZED HEALTH CARE EDUCATION/TRAINING PROGRAM

## 2021-01-03 PROCEDURE — 80048 BASIC METABOLIC PNL TOTAL CA: CPT

## 2021-01-03 PROCEDURE — 6370000000 HC RX 637 (ALT 250 FOR IP): Performed by: STUDENT IN AN ORGANIZED HEALTH CARE EDUCATION/TRAINING PROGRAM

## 2021-01-03 RX ORDER — MAGNESIUM HYDROXIDE/ALUMINUM HYDROXICE/SIMETHICONE 120; 1200; 1200 MG/30ML; MG/30ML; MG/30ML
30 SUSPENSION ORAL EVERY 6 HOURS PRN
Status: DISCONTINUED | OUTPATIENT
Start: 2021-01-03 | End: 2021-01-11 | Stop reason: HOSPADM

## 2021-01-03 RX ORDER — INSULIN GLARGINE 100 [IU]/ML
60 INJECTION, SOLUTION SUBCUTANEOUS NIGHTLY
Status: DISCONTINUED | OUTPATIENT
Start: 2021-01-03 | End: 2021-01-06

## 2021-01-03 RX ADMIN — POLYMYXIN B SULFATE, BACITRACIN ZINC, NEOMYCIN SULFATE: 5000; 3.5; 4 OINTMENT TOPICAL at 21:48

## 2021-01-03 RX ADMIN — MORPHINE SULFATE 4 MG: 4 INJECTION, SOLUTION INTRAMUSCULAR; INTRAVENOUS at 09:01

## 2021-01-03 RX ADMIN — OXYCODONE AND ACETAMINOPHEN 2 TABLET: 5; 325 TABLET ORAL at 23:03

## 2021-01-03 RX ADMIN — CEFEPIME HYDROCHLORIDE 2 G: 2 INJECTION, POWDER, FOR SOLUTION INTRAVENOUS at 03:17

## 2021-01-03 RX ADMIN — MORPHINE SULFATE 4 MG: 4 INJECTION, SOLUTION INTRAMUSCULAR; INTRAVENOUS at 05:24

## 2021-01-03 RX ADMIN — APIXABAN 5 MG: 5 TABLET, FILM COATED ORAL at 09:01

## 2021-01-03 RX ADMIN — MORPHINE SULFATE 4 MG: 4 INJECTION, SOLUTION INTRAMUSCULAR; INTRAVENOUS at 16:41

## 2021-01-03 RX ADMIN — CEFEPIME HYDROCHLORIDE 2 G: 2 INJECTION, POWDER, FOR SOLUTION INTRAVENOUS at 14:28

## 2021-01-03 RX ADMIN — ONDANSETRON 4 MG: 2 INJECTION INTRAMUSCULAR; INTRAVENOUS at 05:24

## 2021-01-03 RX ADMIN — INSULIN LISPRO 3 UNITS: 100 INJECTION, SOLUTION INTRAVENOUS; SUBCUTANEOUS at 18:47

## 2021-01-03 RX ADMIN — ANTACID TABLETS 500 MG: 500 TABLET, CHEWABLE ORAL at 21:43

## 2021-01-03 RX ADMIN — OXYCODONE AND ACETAMINOPHEN 2 TABLET: 5; 325 TABLET ORAL at 15:36

## 2021-01-03 RX ADMIN — OXYCODONE AND ACETAMINOPHEN 2 TABLET: 5; 325 TABLET ORAL at 03:17

## 2021-01-03 RX ADMIN — MORPHINE SULFATE 4 MG: 4 INJECTION, SOLUTION INTRAMUSCULAR; INTRAVENOUS at 11:06

## 2021-01-03 RX ADMIN — FAMOTIDINE 20 MG: 20 TABLET, FILM COATED ORAL at 09:01

## 2021-01-03 RX ADMIN — ONDANSETRON 4 MG: 2 INJECTION INTRAMUSCULAR; INTRAVENOUS at 23:03

## 2021-01-03 RX ADMIN — INSULIN LISPRO 7 UNITS: 100 INJECTION, SOLUTION INTRAVENOUS; SUBCUTANEOUS at 21:43

## 2021-01-03 RX ADMIN — FAMOTIDINE 20 MG: 20 TABLET, FILM COATED ORAL at 21:43

## 2021-01-03 RX ADMIN — METFORMIN HYDROCHLORIDE 500 MG: 500 TABLET ORAL at 09:01

## 2021-01-03 RX ADMIN — METFORMIN HYDROCHLORIDE 500 MG: 500 TABLET ORAL at 16:40

## 2021-01-03 RX ADMIN — SODIUM CHLORIDE: 9 INJECTION, SOLUTION INTRAVENOUS at 09:02

## 2021-01-03 RX ADMIN — INSULIN GLARGINE 60 UNITS: 100 INJECTION, SOLUTION SUBCUTANEOUS at 21:43

## 2021-01-03 RX ADMIN — DAPTOMYCIN 400 MG: 500 INJECTION, POWDER, LYOPHILIZED, FOR SOLUTION INTRAVENOUS at 16:40

## 2021-01-03 RX ADMIN — APIXABAN 5 MG: 5 TABLET, FILM COATED ORAL at 21:43

## 2021-01-03 RX ADMIN — MORPHINE SULFATE 4 MG: 4 INJECTION, SOLUTION INTRAMUSCULAR; INTRAVENOUS at 19:57

## 2021-01-03 RX ADMIN — NORTRIPTYLINE HYDROCHLORIDE 50 MG: 25 CAPSULE ORAL at 21:43

## 2021-01-03 ASSESSMENT — ENCOUNTER SYMPTOMS
ABDOMINAL PAIN: 0
VOMITING: 0
SHORTNESS OF BREATH: 0
RESPIRATORY NEGATIVE: 1
GASTROINTESTINAL NEGATIVE: 1
COUGH: 1
NAUSEA: 0

## 2021-01-03 ASSESSMENT — PAIN DESCRIPTION - FREQUENCY
FREQUENCY: CONTINUOUS
FREQUENCY: CONTINUOUS

## 2021-01-03 ASSESSMENT — PAIN SCALES - GENERAL
PAINLEVEL_OUTOF10: 8
PAINLEVEL_OUTOF10: 7
PAINLEVEL_OUTOF10: 8

## 2021-01-03 ASSESSMENT — PAIN DESCRIPTION - PAIN TYPE
TYPE: SURGICAL PAIN

## 2021-01-03 ASSESSMENT — PAIN DESCRIPTION - ONSET
ONSET: ON-GOING

## 2021-01-03 ASSESSMENT — PAIN DESCRIPTION - ORIENTATION
ORIENTATION: LEFT

## 2021-01-03 ASSESSMENT — PAIN DESCRIPTION - LOCATION
LOCATION: FOOT
LOCATION: FOOT

## 2021-01-03 ASSESSMENT — PAIN DESCRIPTION - DESCRIPTORS
DESCRIPTORS: SHARP
DESCRIPTORS: SHARP
DESCRIPTORS: ACHING;DISCOMFORT
DESCRIPTORS: ACHING;DISCOMFORT;SHARP

## 2021-01-03 ASSESSMENT — PAIN DESCRIPTION - PROGRESSION
CLINICAL_PROGRESSION: NOT CHANGED
CLINICAL_PROGRESSION: NOT CHANGED

## 2021-01-03 NOTE — PROGRESS NOTES
[facility]  Progress Note    Janine Wallace  MRN: [id number]  Date: 1/3/2021  LOS: 3     Reason for F/U :   Left transmetatarsal amputation stump infection    Impression :   1. Left transmetatarsal amputation stump site infection with underlying osteomyelitis of the first and fifth metatarsals  2. Leukocytosis secondary to above, improving  3. 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    Recommendations:   · Patient continues on cefepime and daptomycin  · Previous cultures grew VRE, Proteus mirabilis, coagulase-negative Staphylococcus in August 2020  · Patient underwent incision and drainage of the left foot with removal of nonviable bone and soft tissue  · Blood cultures remain no growth to date  · Cultures thus far with group B beta-hemolytic strep, e coli, and gram-positive cocci in pairs  · Await culture and pathology  · Patient tells me he is scheduled to go back to surgery on Monday or Tuesday    Infection Control Recommendations:   Contact precautions    Discharge Planning:   Estimated Length of IV antimicrobials: To be determined  Patient will need Midline Catheter Insertion/ PICC line Insertion: No  Patient will need: Home IV , [place],  SNF,  LTAC: Undetermined  Patient willneed outpatient wound care: No    Medical Decision making / Summary of Stay:   Janine Wallace is a 63y.o.-year-old male who was initially admitted on 12/31/2020. Patient      Patient is known to our practice for multiple hospitalizations since July 2020. He was initially admitted July 28, 2020 for left hallux gangrene/left foot cellulitis and plantar ulcerations. He underwent left superficial femoral, popliteal, tibial, peroneal trunk, and posterior tibial artery atherectomy/balloon angioplasty 7/29/2020. Patient underwent left TMA with Achilles tendon lengthening 8/5/2020.   Patient was discharged 8/7/2020 to rehab on doxycycline and Augmentin x1 week.     8/23/2020 patient returned to [facility] saying he never received wound care. He was found to have wound dehiscence with concern for underlying osteomyelitis. 8/24/2020 he underwent incision and drainage of the left foot with revision of left foot TMA. At that time, cultures grew VRE, Proteus mirabilis, and coagulase-negative Staphylococcus. He was discharged on oral ciprofloxacin and linezolid through 9/25/2020 to complete a 4-week course.     11/3/2020 patient returned to the emergency department due to drainage from his foot. Patient was found to have TMA stump infection with underlying osteomyelitis of the metatarsals, first and fifth metatarsals did probe to the bone. At that time, we did discuss with podiatry as well as the patient that he would likely need to undergo left BKA; however, they both opted to treat patient with 6 weeks of IV antibiotics. He was discharged on 11/12/2020 on IV cefepime and linezolid to an extended care facility with plans to continue through 12/15/2020.  11/19/2020 patient left skilled nursing facility AMA. IV cefepime was stopped at that time. Patient continued on oral Zyvox. 11/23/2020 patient saw Dr. Sheyla Rosnebaum in the office and patient was instructed to complete the oral linezolid. Patient did follow-up again in the office 12/14/2020 and wound was smaller without any overt signs of infection at that time.     12/26/2020 patient presented to [facility] due to falling and having increasing pain and redness in the left foot. WBC 16,900, CRP 67.5. The wound probed to the bone at that time. It was recommended for patient to be admitted, undergo debridement, and receive IV antibiotics. Patient refused. He was prescribed doxycycline 100 mg p.o. twice a day x10 days and patient signed out [address]     Patient returned to the emergency department today due to pain in the left foot.   He does tell me that he attempted to go to his podiatrist at the beginning of the week but could not get in. Upon arrival to the emergency department, it was noted that he had the same dressing on his foot that was placed in the emergency department last week. He did rate pain 7 out of 10, throbbing and constant. He has not had any documented fevers but does have hot and cold flashes. Patient does not really have any other complaints aside from wanting pain medication.     Patient has been started on vancomycin and Zosyn. We have been consulted to assist with antimicrobial therapy management. 2021 incision and drainage of the left foot with removal of nonviable bone and soft tissue    Current evaluation:1/3/2021    BP (!) 117/45   Pulse 95   Temp 98.8 °F (37.1 °C) (Oral)   Resp 16   Ht 6' 1\" (1.854 m)   Wt 149 lb (67.6 kg)   SpO2 93%   BMI 19.66 kg/m²     Temperature Range: Temp: 98.8 °F (37.1 °C) Temp  Av.4 °F (36.9 °C)  Min: 97.8 °F (36.6 °C)  Max: 98.8 °F (37.1 °C)    Patient seen and evaluated sitting up in bed  Patient continues to have pain in the left lower extremity  Tells me that he is scheduled to go to surgery again on Monday or Tuesday, he is not sure exactly the plan  Tells me that podiatry is changing his dressing  No fevers or chills  Denies needs at this time    Review of Systems   Constitutional: Negative. HENT: Negative. Respiratory: Negative. Cardiovascular: Negative. Gastrointestinal: Negative. Genitourinary: Negative. Musculoskeletal:        Pain in lle   Skin: Negative. Neurological: Negative. Hematological: Negative. Psychiatric/Behavioral: Negative. Physical Examination :     Physical Exam  Constitutional:       General: He is not in acute distress. Appearance: Normal appearance. He is normal weight. HENT:      Head: Normocephalic and atraumatic. Pulmonary:      Effort: Pulmonary effort is normal. No respiratory distress. Abdominal:      General: Abdomen is flat. There is no distension.    Musculoskeletal: Comments: Right lower extremity BKA. Left lower extremity with multiple abrasions/scab/eschar with dressing on stump, surrounding erythema has improved   Skin:     General: Skin is warm and dry. Neurological:      General: No focal deficit present. Mental Status: He is alert and oriented to person, place, and time. Mental status is at baseline. Psychiatric:         Mood and Affect: Mood normal.         Behavior: Behavior normal.         Laboratory data:   I have independently reviewed the followinglabs:  CBC with Differential:   Recent Labs     01/02/21  0700 01/03/21  0658   WBC 17.0* 12.6*   HGB 9.9* 10.3*   HCT 33.4* 35.1*    407     BMP:   Recent Labs     01/01/21  0606 01/02/21  0700    134*   K 3.5* 3.9    104   CO2 25 24   BUN 20 14   CREATININE 0.76 0.60*   MG 1.9  --      Hepatic Function Panel: No results for input(s): PROT, LABALBU, BILIDIR, IBILI, BILITOT, ALKPHOS, ALT, AST in the last 72 hours. No results found for: PROCAL  Lab Results   Component Value Date    .8 01/02/2021    CRP 60.2 12/31/2020    CRP 67.5 12/26/2020     Lab Results   Component Value Date    SEDRATE 70 (H) 12/31/2020         Lab Results   Component Value Date    DDIMER 0.39 06/29/2020    DDIMER 1.63 12/20/2017    DDIMER 0.68 12/25/2011     Lab Results   Component Value Date    FERRITIN 64 01/25/2014     No results found for: LDH  No results found for: FIBRINOGEN    Results in Past 30 Days  Result Component Current Result Ref Range Previous Result Ref Range   SARS-CoV-2      (12/31/2020)  Not in Time Range          (12/31/2020)       Not Detected (12/31/2020) Not Detected       Lab Results   Component Value Date    COVID19 Not Detected 12/31/2020    COVID19 Not Detected 11/10/2020    COVID19 Not Detected 08/23/2020    COVID19 Not Detected 07/29/2020       No results for input(s): VANCOTROUGH in the last 72 hours.     Imaging Studies:   No new imaging    Cultures:     1/2/2021  6:12 PM - [place], [facility] Incoming Lab Results From [employer]    Specimen Information: Foot        Component Collected Lab   Specimen Description 12/31/2020  7:07  [facility]   . FOOT LEFT    Special Requests 12/31/2020  7:07  [facility]   NOT REPORTED    Direct Exam 12/31/2020  7:07 PM [facility]   NO NEUTROPHILS SEEN    Direct Exam Abnormal  12/31/2020  7:07 PM [facility]   RARE GRAM POSITIVE COCCI IN PAIRS    Culture Abnormal  12/31/2020  7:07 PM [facility]   STREPTOCOCCI, BETA HEMOLYTIC GROUP B LIGHT GROWTH    Culture Abnormal  12/31/2020  7:07 PM [address]    Culture Abnormal  12/31/2020  7:07 PM [facility]   NO ANAEROBIC ORGANISMS ISOLATED AT 2 DAYS    Testing Performed By    Lab - Abbreviation Name Director Address Valid Date Range   [address] [id number] 08/30/17 0801-Present   [address] LAB Marisol Lopez MD [address] [facility] [address] 08/30/17 0757-Present   Susceptibility    Escherichia coli (2)    Antibiotic Interpretation DARIAN Status    amikacin   Preliminary     NOT REPORTED    ampicillin Sensitive  Preliminary     4   SUSCEPTIBLE   ampicillin-sulbactam   Preliminary     NOT REPORTED    aztreonam Sensitive  Preliminary     <=1   SUSCEPTIBLE   ceFAZolin Sensitive  Preliminary     <=4   SUSCEPTIBLE   cefepime   Preliminary     NOT REPORTED    cefTRIAXone Sensitive  Preliminary     <=1   SUSCEPTIBLE   ciprofloxacin Sensitive  Preliminary     <=0.25   SUSCEPTIBLE   ertapenem   Preliminary     NOT REPORTED    Confirmatory Extended Spectrum Beta-Lactamase Negative NEGATIVE Preliminary    gentamicin Sensitive  Preliminary     <=1   SUSCEPTIBLE   meropenem   Preliminary     NOT REPORTED    nitrofurantoin   Preliminary     NOT REPORTED tigecycline   Preliminary     NOT REPORTED    tobramycin Sensitive  Preliminary     <=1   SUSCEPTIBLE   trimethoprim-sulfamethoxazole Sensitive  Preliminary     <=20   SUSCEPTIBLE   piperacillin-tazobactam Sensitive  Preliminary     <=4   SUSCEPTIBLE   Lab and Collection    Culture, Anaerobic and Aerobic - 12/31/2020  Result History    Culture, Anaerobic and Aerobic on 1/2/2021         ulture, Wound [[id number]] (Abnormal) Collected: 12/31/20 1240   Order Status: Completed Specimen: Foot Updated: 01/02/21 0940    Specimen Description . FOOT SWAB    Special Requests NOT REPORTED    Direct Exam RARE NEUTROPHILSAbnormal      RARE GRAM POSITIVE RODSAbnormal      RARE GRAM NEGATIVE RODSAbnormal     Culture NORMAL SKIN FLORAAbnormal      STREPTOCOCCI, BETA HEMOLYTIC GROUP B LIGHT GROWTHAbnormal    Culture, Blood 1 [[id number]] Collected: 12/31/20 1502   Order Status: Completed Specimen: Blood Updated: 01/02/21 0734    Specimen Description . BLOOD    Special Requests 8ML LAC    Culture NO GROWTH 2 DAYS   Culture, Blood 1 [[id number]] Collected: 12/31/20 0745   Order Status: Completed Specimen: Blood Updated: 01/02/21 0734    Specimen Description . BLOOD    Special Requests 11ML LINE DRAW    Culture NO GROWTH 2 DAYS   Culture, Blood 1 [[id number]] Collected: 12/31/20 0753   Order Status: Completed Specimen: Blood Updated: 01/02/21 0734    Specimen Description . BLOOD    Special Requests 8ML LEFT AC    Culture NO GROWTH 2 DAYS       Medications:      sodium hypochlorite   Irrigation Daily    neomycin-bacitracin-polymyxin   Topical BID    apixaban  5 mg Oral BID    metFORMIN  500 mg Oral BID WC    nortriptyline  50 mg Oral Nightly    sodium chloride flush  10 mL Intravenous 2 times per day    insulin lispro  0-18 Units Subcutaneous TID WC    insulin lispro  0-9 Units Subcutaneous Nightly    cefepime  2 g Intravenous Q12H    daptomycin (CUBICIN) IVPB  6 mg/kg Intravenous Q24H    insulin glargine  70 Units Subcutaneous Nightly    famotidine  20 mg Oral BID           [facility]  [address] [place] [address]  [employer] messaging  OFFICE: [phone]      Electronically signed by Prem [address] [facility], APRN - CNP on 1/3/2021 at 7:52 AM  Thank you for allowing us to participate in the care of this patient. Please call with questions. This note iscreated with the assistance of a speech recognition program.  While intending to generate a document that actually reflects the content of the visit, the document can still have some errors including those of syntax andsound a like substitutions which may escape proof reading. In such instances, actual meaning can be extrapolated by contextual diversion.

## 2021-01-03 NOTE — PROGRESS NOTES
Physician Progress Note      PATIENT:               Ancelmo Melendez  CSN #:                  [id number]  :                       1955  ADMIT DATE:       2020 7:08 AM  DISCH DATE:  RESPONDING  PROVIDER #:        Amador RUSSELL DO          QUERY TEXT:    Patient admitted with infected diabetic ulcer left foot. Documentation in H&P reflects sepsis evaluation underway. If possible, please document in the progress notes and discharge summary if   Sepsis  was: The medical record reflects the following:  Risk Factors: COPD,  PVD, poorly controlled DM  Clinical Indicators: admission WBC 28.2,  Glucose 846, CRP 60.2,   HR max 94  Treatment: IV Zosyn, IV vancomycin    Gabriel Monte RN, CCDS  Clinical   [phone]  . Loly Barlow Options provided:  -- Sepsis due to infected diabetic ulcer left foot  confirmed after study  -- Sepsis ruled out after study  -- Other - I will add my own diagnosis  -- Disagree - Not applicable / Not valid  -- Disagree - Clinically unable to determine / Unknown  -- Refer to Clinical Documentation Reviewer    PROVIDER RESPONSE TEXT:    Sepsis ruled out after study.     Query created by: Victorino Daniels on 2020 2:13 PM      Electronically signed by:  Sarah Shukla DO 1/3/2021 3:03 PM

## 2021-01-03 NOTE — PLAN OF CARE
Problem: Discharge Planning:  Goal: Discharged to appropriate level of care  Description: Discharged to appropriate level of care  1/3/2021 1539 by Mimi Paris RN  Outcome: Ongoing     Problem: Serum Glucose Level - Abnormal:  Goal: Ability to maintain appropriate glucose levels will improve  Description: Ability to maintain appropriate glucose levels will improve  1/3/2021 1539 by Mimi Paris RN  Outcome: Ongoing     Problem: Sensory Perception - Impaired:  Goal: Ability to maintain a stable neurologic state will improve  Description: Ability to maintain a stable neurologic state will improve  1/3/2021 1539 by Mimi Paris RN  Outcome: Ongoing     Problem: SAFETY  Goal: Free from accidental physical injury  1/3/2021 1539 by Mimi Paris RN  Outcome: Ongoing     Problem: SAFETY  Goal: Free from intentional harm  1/3/2021 1539 by Mimi Paris RN  Outcome: Ongoing     Problem: DAILY CARE  Goal: Daily care needs are met  1/3/2021 1539 by Mimi Paris RN  Outcome: Ongoing     Problem: PAIN  Goal: Patient's pain/discomfort is manageable  1/3/2021 1539 by Mimi Paris RN  Outcome: Ongoing     Problem: SKIN INTEGRITY  Goal: Skin integrity is maintained or improved  1/3/2021 1539 by Mimi Paris RN  Outcome: Ongoing     Problem: KNOWLEDGE DEFICIT  Goal: Patient/S.O. demonstrates understanding of disease process, treatment plan, medications, and discharge instructions.   1/3/2021 1539 by Mimi Paris RN  Outcome: Ongoing     Problem: DISCHARGE BARRIERS  Goal: Patient's continuum of care needs are met  1/3/2021 1539 by Mimi Paris RN  Outcome: Ongoing     Problem: Skin Integrity:  Goal: Absence of new skin breakdown  Description: Absence of new skin breakdown  1/3/2021 1539 by Mimi Paris RN  Outcome: Ongoing

## 2021-01-03 NOTE — PROGRESS NOTES
Progress Note  Podiatric Medicine and Surgery     Subjective     CC: Left foot infection    Patient seen and examined at bedside. Patient expresses feeling much improved. S/P I&D of left foot (DOS 1/1/2021)  No acute events overnight. Afebrile, vital signs stable   Leukocytosis improving 28.2>22.4>17.0    HPI :  Sal Cardenas is a 63 y. o. male seen at [facility] the floor for a wound on the left foot. The patient was last seen in the emergency department at [facility] [address] on 12/26/2020, at which time patient refused to be admitted so he was given a follow-up for podiatry for 12/28/2020, an appointment which patient missed. The patient is well known to Dr. Josey Leger who performed an I&D and a revision of TMA 8/24/2020 of left lower extremity. Patient is also well known to Dr. Whitley Morales. Patient states he has been compliant with the antibiotic prescription given at his last emergency department visit. 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 Patient states he wears a diabetic shoe to the left lower extremity when ambulating. Of note, patient has a prior BKA to [facility] and reports he has fallen numerous times over the last week because of issues with his right lower extremity prosthetic. Patient denies hitting his head, losing consciousness or suffering any trauma with the falls. Patient admits to smoking on & off, up to 1 pack per day when smoking heavily. The patient denies any other pedal complaints at this time.     PCP is Lili Clarke DO    ROS: Denies N/V/F/C/SOB/CP. Otherwise negative except at stated in the HPI.      Medications:  Scheduled Meds:   sodium hypochlorite   Irrigation Daily    neomycin-bacitracin-polymyxin   Topical BID    apixaban  5 mg Oral BID    metFORMIN  500 mg Oral BID     nortriptyline  50 mg Oral Nightly    sodium chloride flush  10 mL Intravenous 2 times per day    insulin lispro  0-18 Units Subcutaneous TID     insulin lispro  0-9 Units Subcutaneous Nightly    cefepime  2 g Intravenous Q12H    daptomycin (CUBICIN) IVPB  6 mg/kg Intravenous Q24H    insulin glargine  70 Units Subcutaneous Nightly    famotidine  20 mg Oral BID       Continuous Infusions:   sodium chloride 75 mL/hr at 21 1720    dextrose         PRN Meds:calcium carbonate, metoprolol, albuterol sulfate HFA, oxyCODONE-acetaminophen, sodium chloride flush, potassium chloride **OR** potassium alternative oral replacement **OR** potassium chloride, magnesium sulfate, promethazine **OR** ondansetron, polyethylene glycol, nicotine, acetaminophen **OR** acetaminophen, morphine **OR** morphine, glucose, dextrose, glucagon (rDNA), dextrose, diphenhydrAMINE    Objective     Vitals:  Patient Vitals for the past 8 hrs:   BP Temp Temp src Pulse Resp SpO2 Weight   21 0410 -- -- -- -- -- -- 149 lb (67.6 kg)   21 0330 (!) 117/45 98.8 °F (37.1 °C) Oral 95 16 93 % --   21 2354 129/61 98.8 °F (37.1 °C) Oral 94 18 96 % --     Average, Min, and Max for last 24 hours Vitals:  TEMPERATURE:  Temp  Av.5 °F (36.9 °C)  Min: 97.8 °F (36.6 °C)  Max: 98.8 °F (37.1 °C)    RESPIRATIONS RANGE: Resp  Av.5  Min: 16  Max: 18    PULSE RANGE: Pulse  Av  Min: 93  Max: 107    BLOOD PRESSURE RANGE:  Systolic (01XHB), WYO:005 , Min:117 , QUX:055   ; Diastolic (03RDO), WJU:02, Min:45, Max:81      PULSE OXIMETRY RANGE: SpO2  Av.6 %  Min: 93 %  Max: 97 %    I/O last 3 completed shifts:   In:  [I.V.:]  Out: 2600 [Urine:2600]    CBC:  Recent Labs     20  0745 21  0606 21  0700 21  0658   WBC 28.2* 22.4* 17.0* 12.6*   HGB 11.5* 10.5* 9.9* 10.3*   HCT 38.6* 35.0* 33.4* 35.1*   * 465* 421 407   CRP 60.2*  --  110.8*  --         BMP:  Recent Labs     20  0745 21  0606 21  0700   * 137 134*   K 4.8 3.5* 3.9   CL 89* 104 104   CO2 25 25 24   BUN 25* 20 14   CREATININE 0.95 0.76 0.60*   GLUCOSE 846* 114* 92   CALCIUM 8.8 8.4* 8.2*        Coags:  No results for input(s): APTT, PROT, INR in the last 72 hours. Lab Results   Component Value Date    SEDRATE 70 (H) 12/31/2020     Recent Labs     12/31/20  0745 01/02/21  0700   CRP 60.2* 110.8*       Left Lower Extremity Physical Exam:     Vascular: DP and PT pulses are Non-palpable, PT and AT audible on doppler.  CFT <3 seconds to dorsum of foot.  Hair growth is absent to the foot.  Moderate non-pitting edema to the left lower extremity.       Neuro: Saph/sural/SP/DP/plantar sensation diminished to light touch.     Musculoskeletal: Muscle strength is 4/5 against resistance to lower extremity muscle groups. Gross deformity is present, right LE BKA & left LE TMA. TTP absent to left foot, ankle, and calf.      Dermatologic:  Ulcer #1 located level of 5th metatarsal TMA site and measures approximately 5.0cm x 3.0 x 4.5cm deep. the wound base is granular. Periwound skin with some mild erythema expected for post-op status.  Approximately 1 cc of candido-purulent drainage expressed from the plantar aspect of the wound with no associated mal odor. Mild erythema present to dorso-lateral foot below level of ankle with associated mild increase in warmth. Scant purulent drainage also able to be expressed from the dorsal aspect of the wound with no associated mal odor. 5th metatarsal is exposed. No crepitus or induration pre- or post-debridement.      Ulcer #2 located level of first metatarsal TMA site, distally and measures approximately 0.4 cm x 04 cm x 0.2 cm. The wound base is fibrogranular. Periwound skin is hyperkeratotic. No drainage with no associated increase in warmth. Mild periwound erythema. Does not probe to bone. Does not sinus track, undermine, or have fluctuance. No crepitus or induration.     Superficial, dry, intact eschar present to dorsal foot and medial foot. No drainage noted, does not probe. No fluctuance, crepitus, or induration noted.  Minimal associated erythema with minimal increase in warmth.     Clinical Images: 12/31/2020           Imaging:   VL Arterial PVR Lower   Final Result      XR FOOT LEFT (MIN 3 VIEWS)   Final Result   Ulceration about the remaining 5th metatarsal appears larger in the interval.   While the underlying bone appears unchanged in the interval, osteomyelitis is   not excluded. No soft tissue gas identified. Cultures:   Culture, Anaerobic and Aerobic [[id number]] (Abnormal) Collected: 12/31/20 1907   Order Status: Completed Specimen: Foot Updated: 12/31/20 3030    Specimen Description . FOOT LEFT    Special Requests NOT REPORTED    Direct Exam NO NEUTROPHILS SEEN     RARE GRAM POSITIVE COCCI IN PAIRSAbnormal     Culture PENDING   Culture, Wound [[id number]] (Abnormal) Collected: 12/31/20 1240   Order Status: Completed Specimen: Foot Updated: 12/31/20 1653    Specimen Description . FOOT SWAB    Special Requests NOT REPORTED    Direct Exam RARE NEUTROPHILSAbnormal      RARE GRAM POSITIVE RODSAbnormal      RARE GRAM NEGATIVE RODSAbnormal     Culture PENDING          Assessment   Carmen Bland is a 63 y.o. male with   1. Osteomyelitis 5th metatarsal, left foot  2. Cellulitis, Left LE  3. S/p revision of TMA & I&D (DOS: 8/24/2020), LLE  4. 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 Previous BKA, RLE   6. COPD  7.  CAD    Principal Problem:    Diabetic ulcer of left midfoot associated with type 2 diabetes mellitus, with fat layer exposed ([address]  Active Problems:    DM type 2 with diabetic peripheral neuropathy (HCC)    Chronic obstructive pulmonary disease (HCC)    HLD (hyperlipidemia)    Gastroesophageal reflux disease    Peripheral artery disease (HCC)    COPD exacerbation (HCC)    Below-knee amputation of right lower extremity ([address]    Essential hypertension    Uncontrolled type 2 diabetes mellitus with hyperglycemia (HCC)    Anemia, normocytic normochromic    Diabetic foot infection ([address]    Diabetic infection of left foot ([address]    Hypocalcemia    Hypokalemia  Resolved Problems:    * No resolved hospital problems. *       Plan     · Patient examined and evaluated at bedside. · Treatment options discussed in detail with the patient. · Educated patient on the importance of smoking cessation to allow for optimal healing. · Educated patient on the importance of tight glycemic control for optimal healing. · PVR reviewed, significant drop in HILARY since previous PVR. Vascular surgery consulted. · MRI ordered of left foot to evaluate infection. · Will plan for wound vac vs further debridement after MRI results. Explained to patient that this will likely need further debridement in the OR. Patient expressed understanding and is amenable. · Medical management per primary team.  · ABx per ID : Cefepime, daptomycin  · Dressings changed today: Dakins soaked 1/2\" plain packing, DSD, light ace. Podiatry will change dressing daily in the AM. Nursing to change dressing daily in the PM.  Nursing dressings to include: Dakins soaked 1/4\" plain packing, 4x4 gauze, ABD, Kerlix and secured with tape. · Nursing to apply antibiotic ointment to eschars located to the legs. · PWB to heel touch, left lower extremity in CAM boot  · CAM boot ordered.   · Patient seen and discussed [address], [address] [place] [address] Medicine & Surgery   1/3/2021 at 7:26 AM
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
## 2021-01-03 NOTE — PLAN OF CARE
Problem: Discharge Planning:  Goal: Discharged to appropriate level of care  Description: Discharged to appropriate level of care  1/3/2021 0214 by Alejandrina Ortiz RN  Outcome: Ongoing     Problem: Serum Glucose Level - Abnormal:  Goal: Ability to maintain appropriate glucose levels will improve  Description: Ability to maintain appropriate glucose levels will improve  1/3/2021 0214 by Alejandrina Ortiz RN  Outcome: Ongoing     Problem: Sensory Perception - Impaired:  Goal: Ability to maintain a stable neurologic state will improve  Description: Ability to maintain a stable neurologic state will improve  1/3/2021 0214 by Alejandrina Ortiz RN  Outcome: Ongoing     Problem: SAFETY  Goal: Free from accidental physical injury  1/3/2021 0214 by Alejandrina Ortiz RN  Outcome: Ongoing     Problem: SAFETY  Goal: Free from intentional harm  1/3/2021 0214 by Alejandrina Ortiz RN  Outcome: Ongoing     Problem: SKIN INTEGRITY  Goal: Skin integrity is maintained or improved  1/3/2021 0214 by Alejandrina Ortiz RN  Outcome: Ongoing     Problem: KNOWLEDGE DEFICIT  Goal: Patient/S.O. demonstrates understanding of disease process, treatment plan, medications, and discharge instructions.   1/3/2021 0214 by Alejandrina Ortiz RN  Outcome: Ongoing

## 2021-01-04 ENCOUNTER — APPOINTMENT (OUTPATIENT)
Dept: GENERAL RADIOLOGY | Age: 66
DRG: 464 | End: 2021-01-04
Payer: MEDICARE

## 2021-01-04 ENCOUNTER — APPOINTMENT (OUTPATIENT)
Dept: MRI IMAGING | Age: 66
DRG: 464 | End: 2021-01-04
Payer: MEDICARE

## 2021-01-04 LAB
ANION GAP SERPL CALCULATED.3IONS-SCNC: 10 MMOL/L (ref 9–17)
BUN BLDV-MCNC: 12 MG/DL (ref 8–23)
BUN/CREAT BLD: 19 (ref 9–20)
CALCIUM SERPL-MCNC: 8.4 MG/DL (ref 8.6–10.4)
CHLORIDE BLD-SCNC: 103 MMOL/L (ref 98–107)
CO2: 23 MMOL/L (ref 20–31)
CREAT SERPL-MCNC: 0.64 MG/DL (ref 0.7–1.2)
GFR AFRICAN AMERICAN: >60 ML/MIN
GFR NON-AFRICAN AMERICAN: >60 ML/MIN
GFR SERPL CREATININE-BSD FRML MDRD: ABNORMAL ML/MIN/{1.73_M2}
GFR SERPL CREATININE-BSD FRML MDRD: ABNORMAL ML/MIN/{1.73_M2}
GLUCOSE BLD-MCNC: 105 MG/DL (ref 75–110)
GLUCOSE BLD-MCNC: 136 MG/DL (ref 75–110)
GLUCOSE BLD-MCNC: 167 MG/DL (ref 75–110)
GLUCOSE BLD-MCNC: 72 MG/DL (ref 75–110)
GLUCOSE BLD-MCNC: 93 MG/DL (ref 70–99)
GLUCOSE BLD-MCNC: 95 MG/DL (ref 75–110)
GLUCOSE BLD-MCNC: >600 MG/DL (ref 75–110)
HCT VFR BLD CALC: 34.1 % (ref 40.7–50.3)
HEMOGLOBIN: 10 G/DL (ref 13–17)
MCH RBC QN AUTO: 26.1 PG (ref 25.2–33.5)
MCHC RBC AUTO-ENTMCNC: 29.3 G/DL (ref 28.4–34.8)
MCV RBC AUTO: 89 FL (ref 82.6–102.9)
NRBC AUTOMATED: 0 PER 100 WBC
PDW BLD-RTO: 15.5 % (ref 11.8–14.4)
PLATELET # BLD: 436 K/UL (ref 138–453)
PMV BLD AUTO: 8.9 FL (ref 8.1–13.5)
POTASSIUM SERPL-SCNC: 3.8 MMOL/L (ref 3.7–5.3)
RBC # BLD: 3.83 M/UL (ref 4.21–5.77)
SODIUM BLD-SCNC: 136 MMOL/L (ref 135–144)
WBC # BLD: 14.1 K/UL (ref 3.5–11.3)

## 2021-01-04 PROCEDURE — 2580000003 HC RX 258: Performed by: INTERNAL MEDICINE

## 2021-01-04 PROCEDURE — 1200000000 HC SEMI PRIVATE

## 2021-01-04 PROCEDURE — 6370000000 HC RX 637 (ALT 250 FOR IP): Performed by: STUDENT IN AN ORGANIZED HEALTH CARE EDUCATION/TRAINING PROGRAM

## 2021-01-04 PROCEDURE — 71045 X-RAY EXAM CHEST 1 VIEW: CPT

## 2021-01-04 PROCEDURE — 6360000002 HC RX W HCPCS: Performed by: STUDENT IN AN ORGANIZED HEALTH CARE EDUCATION/TRAINING PROGRAM

## 2021-01-04 PROCEDURE — 36415 COLL VENOUS BLD VENIPUNCTURE: CPT

## 2021-01-04 PROCEDURE — 80048 BASIC METABOLIC PNL TOTAL CA: CPT

## 2021-01-04 PROCEDURE — A9579 GAD-BASE MR CONTRAST NOS,1ML: HCPCS | Performed by: INTERNAL MEDICINE

## 2021-01-04 PROCEDURE — 85027 COMPLETE CBC AUTOMATED: CPT

## 2021-01-04 PROCEDURE — 99232 SBSQ HOSP IP/OBS MODERATE 35: CPT | Performed by: INTERNAL MEDICINE

## 2021-01-04 PROCEDURE — 2580000003 HC RX 258: Performed by: STUDENT IN AN ORGANIZED HEALTH CARE EDUCATION/TRAINING PROGRAM

## 2021-01-04 PROCEDURE — 6370000000 HC RX 637 (ALT 250 FOR IP): Performed by: INTERNAL MEDICINE

## 2021-01-04 PROCEDURE — 6360000004 HC RX CONTRAST MEDICATION: Performed by: INTERNAL MEDICINE

## 2021-01-04 PROCEDURE — 73720 MRI LWR EXTREMITY W/O&W/DYE: CPT

## 2021-01-04 PROCEDURE — 99211 OFF/OP EST MAY X REQ PHY/QHP: CPT

## 2021-01-04 RX ORDER — SODIUM CHLORIDE 0.9 % (FLUSH) 0.9 %
10 SYRINGE (ML) INJECTION PRN
Status: CANCELLED | OUTPATIENT
Start: 2021-01-04

## 2021-01-04 RX ORDER — SODIUM CHLORIDE 0.9 % (FLUSH) 0.9 %
10 SYRINGE (ML) INJECTION
Status: COMPLETED | OUTPATIENT
Start: 2021-01-04 | End: 2021-01-04

## 2021-01-04 RX ORDER — GUAIFENESIN 600 MG/1
600 TABLET, EXTENDED RELEASE ORAL 2 TIMES DAILY
Status: DISCONTINUED | OUTPATIENT
Start: 2021-01-04 | End: 2021-01-11 | Stop reason: HOSPADM

## 2021-01-04 RX ORDER — IPRATROPIUM BROMIDE AND ALBUTEROL SULFATE 2.5; .5 MG/3ML; MG/3ML
1 SOLUTION RESPIRATORY (INHALATION) EVERY 4 HOURS PRN
Status: DISCONTINUED | OUTPATIENT
Start: 2021-01-04 | End: 2021-01-11 | Stop reason: HOSPADM

## 2021-01-04 RX ORDER — SODIUM CHLORIDE 0.9 % (FLUSH) 0.9 %
10 SYRINGE (ML) INJECTION EVERY 12 HOURS SCHEDULED
Status: CANCELLED | OUTPATIENT
Start: 2021-01-04

## 2021-01-04 RX ADMIN — MORPHINE SULFATE 4 MG: 4 INJECTION, SOLUTION INTRAMUSCULAR; INTRAVENOUS at 09:08

## 2021-01-04 RX ADMIN — CEFEPIME HYDROCHLORIDE 2 G: 2 INJECTION, POWDER, FOR SOLUTION INTRAVENOUS at 03:42

## 2021-01-04 RX ADMIN — POLYMYXIN B SULFATE, BACITRACIN ZINC, NEOMYCIN SULFATE: 5000; 3.5; 4 OINTMENT TOPICAL at 13:44

## 2021-01-04 RX ADMIN — FAMOTIDINE 20 MG: 20 TABLET, FILM COATED ORAL at 09:08

## 2021-01-04 RX ADMIN — SODIUM CHLORIDE: 9 INJECTION, SOLUTION INTRAVENOUS at 15:34

## 2021-01-04 RX ADMIN — GADOTERIDOL 14 ML: 279.3 INJECTION, SOLUTION INTRAVENOUS at 08:47

## 2021-01-04 RX ADMIN — FAMOTIDINE 20 MG: 20 TABLET, FILM COATED ORAL at 21:21

## 2021-01-04 RX ADMIN — DAPTOMYCIN 400 MG: 500 INJECTION, POWDER, LYOPHILIZED, FOR SOLUTION INTRAVENOUS at 14:37

## 2021-01-04 RX ADMIN — DAKIN'S SOLUTION 0.125% (QUARTER STRENGTH): 0.12 SOLUTION at 09:47

## 2021-01-04 RX ADMIN — METFORMIN HYDROCHLORIDE 500 MG: 500 TABLET ORAL at 17:32

## 2021-01-04 RX ADMIN — ALUMINUM HYDROXIDE, MAGNESIUM HYDROXIDE, AND SIMETHICONE 30 ML: 200; 200; 20 SUSPENSION ORAL at 15:33

## 2021-01-04 RX ADMIN — SODIUM CHLORIDE, PRESERVATIVE FREE 10 ML: 5 INJECTION INTRAVENOUS at 07:58

## 2021-01-04 RX ADMIN — SODIUM CHLORIDE: 9 INJECTION, SOLUTION INTRAVENOUS at 00:08

## 2021-01-04 RX ADMIN — ONDANSETRON 4 MG: 2 INJECTION INTRAMUSCULAR; INTRAVENOUS at 07:58

## 2021-01-04 RX ADMIN — MORPHINE SULFATE 4 MG: 4 INJECTION, SOLUTION INTRAMUSCULAR; INTRAVENOUS at 06:36

## 2021-01-04 RX ADMIN — GUAIFENESIN 600 MG: 600 TABLET, EXTENDED RELEASE ORAL at 21:21

## 2021-01-04 RX ADMIN — CEFEPIME HYDROCHLORIDE 2 G: 2 INJECTION, POWDER, FOR SOLUTION INTRAVENOUS at 15:30

## 2021-01-04 RX ADMIN — MORPHINE SULFATE 4 MG: 4 INJECTION, SOLUTION INTRAMUSCULAR; INTRAVENOUS at 19:40

## 2021-01-04 RX ADMIN — OXYCODONE AND ACETAMINOPHEN 2 TABLET: 5; 325 TABLET ORAL at 14:37

## 2021-01-04 RX ADMIN — NORTRIPTYLINE HYDROCHLORIDE 50 MG: 25 CAPSULE ORAL at 21:21

## 2021-01-04 RX ADMIN — INSULIN GLARGINE 60 UNITS: 100 INJECTION, SOLUTION SUBCUTANEOUS at 21:21

## 2021-01-04 RX ADMIN — OXYCODONE AND ACETAMINOPHEN 2 TABLET: 5; 325 TABLET ORAL at 21:21

## 2021-01-04 RX ADMIN — MORPHINE SULFATE 4 MG: 4 INJECTION, SOLUTION INTRAMUSCULAR; INTRAVENOUS at 13:44

## 2021-01-04 RX ADMIN — INSULIN LISPRO 3 UNITS: 100 INJECTION, SOLUTION INTRAVENOUS; SUBCUTANEOUS at 17:33

## 2021-01-04 RX ADMIN — Medication 10 ML: at 08:56

## 2021-01-04 RX ADMIN — POLYMYXIN B SULFATE, BACITRACIN ZINC, NEOMYCIN SULFATE: 5000; 3.5; 4 OINTMENT TOPICAL at 21:26

## 2021-01-04 RX ADMIN — MORPHINE SULFATE 4 MG: 4 INJECTION, SOLUTION INTRAMUSCULAR; INTRAVENOUS at 03:42

## 2021-01-04 RX ADMIN — APIXABAN 5 MG: 5 TABLET, FILM COATED ORAL at 09:08

## 2021-01-04 RX ADMIN — MORPHINE SULFATE 4 MG: 4 INJECTION, SOLUTION INTRAMUSCULAR; INTRAVENOUS at 00:08

## 2021-01-04 RX ADMIN — METFORMIN HYDROCHLORIDE 500 MG: 500 TABLET ORAL at 09:08

## 2021-01-04 ASSESSMENT — PAIN SCALES - GENERAL
PAINLEVEL_OUTOF10: 8
PAINLEVEL_OUTOF10: 7
PAINLEVEL_OUTOF10: 8
PAINLEVEL_OUTOF10: 8

## 2021-01-04 ASSESSMENT — PAIN DESCRIPTION - PROGRESSION
CLINICAL_PROGRESSION: NOT CHANGED
CLINICAL_PROGRESSION: NOT CHANGED

## 2021-01-04 ASSESSMENT — PAIN DESCRIPTION - PAIN TYPE
TYPE: SURGICAL PAIN
TYPE: SURGICAL PAIN

## 2021-01-04 ASSESSMENT — PAIN DESCRIPTION - DESCRIPTORS
DESCRIPTORS: ACHING;DISCOMFORT

## 2021-01-04 ASSESSMENT — PAIN DESCRIPTION - ONSET
ONSET: ON-GOING
ONSET: ON-GOING

## 2021-01-04 ASSESSMENT — ENCOUNTER SYMPTOMS
RESPIRATORY NEGATIVE: 1
SHORTNESS OF BREATH: 0
GASTROINTESTINAL NEGATIVE: 1
VOMITING: 0
COUGH: 1
ABDOMINAL PAIN: 0
ALLERGIC/IMMUNOLOGIC NEGATIVE: 1
NAUSEA: 0

## 2021-01-04 ASSESSMENT — PAIN DESCRIPTION - FREQUENCY
FREQUENCY: CONTINUOUS

## 2021-01-04 ASSESSMENT — PAIN DESCRIPTION - LOCATION
LOCATION: FOOT

## 2021-01-04 ASSESSMENT — PAIN DESCRIPTION - ORIENTATION
ORIENTATION: LEFT

## 2021-01-04 NOTE — PROGRESS NOTES
Progress Note  Podiatric Medicine and Surgery     Subjective     CC: Left foot infection    Patient seen and examined at bedside. S/P I&D of left foot (DOS 1/1/2021)  No acute events overnight. MRI this morning  Afebrile, vital signs stable   Leukocytosis improving 28.2>22.4>17.0>14.1  Patient expresses nausea and lack of appetite. HPI :  Roseline Cardenas is a 63 y. o. male seen at [facility] the floor for a wound on the left foot. The patient was last seen in the emergency department at [phone] on 12/26/2020, at which time patient refused to be admitted so he was given a follow-up for podiatry for 12/28/2020, an appointment which patient missed. The patient is well known to Dr. Zina Mccullough who performed an I&D and a revision of TMA 8/24/2020 of left lower extremity. Patient is also well known to Dr. Belem Horne. Patient states he has been compliant with the antibiotic prescription given at his last emergency department visit. 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 Patient states he wears a diabetic shoe to the left lower extremity when ambulating. Of note, patient has a prior BKA to [facility] and reports he has fallen numerous times over the last week because of issues with his right lower extremity prosthetic. Patient denies hitting his head, losing consciousness or suffering any trauma with the falls. Patient admits to smoking on & off, up to 1 pack per day when smoking heavily. The patient denies any other pedal complaints at this time.     PCP is Mayra Perez DO    ROS: Denies N/V/F/C/SOB/CP. Otherwise negative except at stated in the HPI.      Medications:  Scheduled Meds:   insulin glargine  60 Units Subcutaneous Nightly    sodium hypochlorite   Irrigation Daily    neomycin-bacitracin-polymyxin   Topical BID    apixaban  5 mg Oral BID    metFORMIN  500 mg Oral BID WC    nortriptyline  50 mg Oral Nightly    sodium chloride flush  10 mL Coags:  No results for input(s): APTT, PROT, INR in the last 72 hours. Lab Results   Component Value Date    SEDRATE 70 (H) 12/31/2020     Recent Labs     01/02/21  0700   .8*       Left Lower Extremity Physical Exam:     Vascular: DP and PT pulses are Non-palpable, PT and AT audible on doppler.  CFT <3 seconds to dorsum of foot.  Hair growth is absent to the foot.  Moderate non-pitting edema to the left lower extremity.       Neuro: Saph/sural/SP/DP/plantar sensation diminished to light touch.     Musculoskeletal: Muscle strength is 4/5 against resistance to lower extremity muscle groups. Gross deformity is present, right LE BKA & left LE TMA. TTP absent to left foot, ankle, and calf.      Dermatologic:  Ulcer #1 located level of 5th metatarsal TMA site and measures approximately 5.0cm x 3.0 x 4.5cm deep. the wound base is fibro-granular. Periwound skin with minimal erythema.  Approximately 1 cc of candido-purulent drainage expressed from the plantar aspect of the wound with no associated mal odor. Mild erythema present to dorso-lateral foot below level of ankle with associated mild increase in warmth. Scant purulent drainage also able to be expressed from the dorsal aspect of the wound with no associated mal odor. 5th metatarsal is exposed. No crepitus or induration pre- or post-debridement.      Ulcer #2 located level of first metatarsal TMA site, distally and measures approximately 0.4 cm x 04 cm x 0.2 cm. The wound base is fibrogranular. Periwound skin is hyperkeratotic. No drainage with no associated increase in warmth. Mild periwound erythema. Does not probe to bone. Does not sinus track, undermine, or have fluctuance. No crepitus or induration.     Superficial, dry, intact eschar present to dorsal foot and medial foot. No drainage noted, does not probe. No fluctuance, crepitus, or induration noted.  Minimal associated erythema with minimal increase in warmth.     Clinical Images: 12/31/2020               Imaging:   MRI FOOT LEFT W WO CONTRAST   Preliminary Result   Acute osteomyelitis of the remaining 5th metatarsal.      Patchy bone marrow edema is seen in the remaining 1st-4th metatarsals as well   as the cuboid without definite marrow replacement. This could represent   reactive bone marrow edema versus early acute osteomyelitis. Nonenhancing 1.5 x 0.6 x 2.2 cm T2 hyperintense lesion at the plantar aspect   of the 4th metatarsal suspicious for an abscess. Nonspecific generalized plantar muscle edema. VL Arterial PVR Lower   Final Result      XR FOOT LEFT (MIN 3 VIEWS)   Final Result   Ulceration about the remaining 5th metatarsal appears larger in the interval.   While the underlying bone appears unchanged in the interval, osteomyelitis is   not excluded. No soft tissue gas identified. Cultures:   Culture, Anaerobic and Aerobic [[id number]] (Abnormal)  Collected: 12/31/20 1907   Order Status: Completed Specimen: Foot Updated: 01/03/21 1655    Specimen Description . FOOT LEFT    Special Requests NOT REPORTED    Direct Exam NO NEUTROPHILS SEEN     RARE GRAM POSITIVE COCCI IN PAIRSAbnormal     Culture STREPTOCOCCI, BETA HEMOLYTIC GROUP B LIGHT GROWTHAbnormal      ESCHERICHIA COLI LIGHT GROWTHAbnormal      NO ANAEROBIC ORGANISMS ISOLATED AT 3 DAYSAbnormal    Culture, Wound [[id number]] (Abnormal) Collected: 12/31/20 1240   Order Status: Completed Specimen: Foot Updated: 01/02/21 0940    Specimen Description . FOOT SWAB    Special Requests NOT REPORTED    Direct Exam RARE NEUTROPHILSAbnormal      RARE GRAM POSITIVE RODSAbnormal      RARE GRAM NEGATIVE RODSAbnormal     Culture NORMAL SKIN FLORAAbnormal      STREPTOCOCCI, BETA HEMOLYTIC GROUP B LIGHT GROWTHAbnormal             Assessment   Grady Norwood is a 63 y.o. male with   1. Osteomyelitis 5th metatarsal, left foot  2. Cellulitis, Left LE  3. S/p revision of TMA & I&D (DOS: 8/24/2020), LLE  4.  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 Previous BKA, RLE   6. COPD  7. CAD    Principal Problem:    Diabetic ulcer of left midfoot associated with type 2 diabetes mellitus, with fat layer exposed ([address]  Active Problems:    DM type 2 with diabetic peripheral neuropathy (HCC)    Chronic obstructive pulmonary disease (HCC)    HLD (hyperlipidemia)    Gastroesophageal reflux disease    Peripheral artery disease (HCC)    COPD exacerbation (HCC)    Below-knee amputation of right lower extremity ([address]    Essential hypertension    Uncontrolled type 2 diabetes mellitus with hyperglycemia (HCC)    Anemia, normocytic normochromic    Diabetic foot infection ([address]    Diabetic infection of left foot (HCC)    Hypocalcemia    Hypokalemia  Resolved Problems:    * No resolved hospital problems. *       Plan     · Patient examined and evaluated at bedside. · Treatment options discussed in detail with the patient. · MRI reviewed. · Educated patient on the importance of smoking cessation to allow for optimal healing. · Educated patient on the importance of tight glycemic control for optimal healing. · Medical management per primary team.  · ABx per ID : Cefepime, daptomycin  · Per vascular: continue dressing changes. If wound does not heal will need angiography for further evaluation. · Patient will require further surgical debridement. · Dressings changed today: Dakins soaked 1/2\" plain packing, DSD, light ace. Podiatry will change dressing daily in the AM. Nursing to change dressing daily in the PM.   Nursing dressings to include: Dakins soaked 1/4\" plain packing, 4x4  gauze, ABD, Kerlix and secured with tape. · Nursing to apply antibiotic ointment to eschars located to the legs. · PWB to heel touch, left lower extremity in CAM boot  · CAM boot ordered.   · Discussed with  [address] [place] [address] [facility]   Podiatric Medicine & Surgery   1/4/2021 at 9:31 AM

## 2021-01-04 NOTE — PROGRESS NOTES
VASCULAR SURGERY  PROGRESS NOTE      1/4/2021 3:16 PM  Subjective:   Admit Date: 12/31/2020  PCP: Cassia Robles DO    Chief Complaint   Patient presents with    Foot Pain     L side     Interval History: No complaints. Awaiting MRI. Diet: DIET CARB CONTROL; Carb Control: 3 carb choices (45 gms)/meal    Medications:   Scheduled Meds:   insulin glargine  60 Units Subcutaneous Nightly    sodium hypochlorite   Irrigation Daily    neomycin-bacitracin-polymyxin   Topical BID    apixaban  5 mg Oral BID    metFORMIN  500 mg Oral BID WC    nortriptyline  50 mg Oral Nightly    sodium chloride flush  10 mL Intravenous 2 times per day    insulin lispro  0-18 Units Subcutaneous TID WC    insulin lispro  0-9 Units Subcutaneous Nightly    cefepime  2 g Intravenous Q12H    daptomycin (CUBICIN) IVPB  6 mg/kg Intravenous Q24H    famotidine  20 mg Oral BID     Continuous Infusions:   sodium chloride 75 mL/hr at 01/04/21 0008    dextrose           Labs:   CBC:   Recent Labs     01/02/21  0700 01/03/21  0658 01/04/21  0723   WBC 17.0* 12.6* 14.1*   HGB 9.9* 10.3* 10.0*    407 436     BMP:    Recent Labs     01/02/21  0700 01/03/21  0658 01/04/21  0723   * 134* 136   K 3.9 4.2 3.8    103 103   CO2 24 23 23   BUN 14 14 12   CREATININE 0.60* 0.83 0.64*   GLUCOSE 92 162* 93     Hepatic: No results for input(s): AST, ALT, ALB, BILITOT, ALKPHOS in the last 72 hours. Troponin: Invalid input(s): TROPONIN  BNP: No results for input(s): BNP in the last 72 hours. Lipids: No results for input(s): CHOL, HDL in the last 72 hours. Invalid input(s): LDLCALCU  INR: No results for input(s): INR in the last 72 hours.     Objective:   Vitals: BP (!) 119/55   Pulse 101   Temp 99.3 °F (37.4 °C) (Oral)   Resp 16   Ht 6' 1\" (1.854 m)   Wt 149 lb (67.6 kg)   SpO2 96%   BMI 19.66 kg/m²   General appearance: alert, cooperative and no distress  Mental Status: oriented to person, place and time with normal affect  Neck: good carotid pulses, no JVD  Lungs: clear to auscultation bilaterally, normal effort  Heart: regular rate and rhythm, no murmur,  Abdomen: soft, non-tender, non-distended, bowel sounds present all four quadrants, no masses, hepatomegaly, splenomegaly or aortic enlargement  Extremities: peripheral pulses by Doppler, right BKA site healed, left TMA with open ulcer, no erythema, positive bleeding at skin edges      Assessment:   3 79-year-old male with left TMA ulcer/foot infection  2. PAD  3. Diabetes mellitus  4.  Noncompliance    Patient Active Problem List:     Type 2 diabetes mellitus with diabetic polyneuropathy, with long-term current use of insulin (HCC)     Neuropathic pain, leg     Diabetic polyneuropathy (HCC)     Allergic rhinitis     Tobacco dependence     Edentulous     Dysphagia     Lung nodules     Esophageal cancer (HCC)     Fracture of humerus, left, closed     Closed fracture of humerus     DM type 2 with diabetic peripheral neuropathy (HCC)     Chronic obstructive pulmonary disease (HCC)     HLD (hyperlipidemia)     Gastroesophageal reflux disease     Chronic pain syndrome     Marijuana use     History of colon polyps     Cellulitis of right heel     Functional diarrhea     Sepsis due to methicillin resistant Staphylococcus aureus (MRSA) (HCC)     History of esophageal cancer     Osteomyelitis, chronic, ankle or foot ([address]     Peripheral artery disease ([address]     Other pulmonary embolism without acute cor pulmonale (HCC)     Carotid stenosis, asymptomatic, bilateral     Lower limb amputation status     Fx humeral neck, right, closed, initial encounter     Transient hypotension     Constipation due to opioid therapy     Acute kidney injury ([address]     Diabetic ulcer of left midfoot associated with type 2 diabetes mellitus, with fat layer exposed ([address]     Complication of below knee amputation stump (HCC)     COPD exacerbation (HCC)     Hyponatremia     Pain, phantom limb ([address] Below-knee amputation of right lower extremity (HCC)     Moderate protein malnutrition ([address]     Essential hypertension     Uncontrolled type 2 diabetes mellitus with hyperglycemia ([address]     History of pulmonary embolism - 2017     Atelectasis     Uncontrolled type 2 diabetes mellitus with foot ulcer (HCC)     Azotemia     Anemia, normocytic normochromic     Osteomyelitis of fourth phalange of left foot (HCC)     Drug-induced constipation     Osteomyelitis (HCC)     Diabetic foot infection ([address]     Noncompliance     Type 1 diabetes mellitus with diabetic foot infection ([address]     Acute osteomyelitis of left foot (HCC)     Cellulitis of left foot     Mild malnutrition (HCC)     Diabetic infection of left foot (HCC)     Hypocalcemia     Hypokalemia      Plan:   1. Continue ABX and dressing changes  2. Smoking cessation  3.    Await MRI    Electronically signed by Jinny Vera MD on 1/4/2021 at 3:16 PM

## 2021-01-04 NOTE — PLAN OF CARE
Problem: SAFETY  Goal: Free from accidental physical injury  1/4/2021 1324 by Moustapha Patel RN  Outcome: Ongoing  Note: Fall safety precautions as much as pt will allow  1/4/2021 0051 by Jenny Wynne RN  Outcome: Ongoing     Problem: PAIN  Goal: Patient's pain/discomfort is manageable  1/4/2021 1324 by Moustapha Patel RN  Outcome: Ongoing  Note: Reports adequate pain relief with current meds  1/4/2021 0051 by Jenny Wynne RN  Outcome: Ongoing
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
## 2021-01-04 NOTE — PLAN OF CARE
Problem: Discharge Planning:  Goal: Discharged to appropriate level of care  Description: Discharged to appropriate level of care  1/4/2021 0051 by Angie Malik RN  Outcome: Ongoing     Problem: Serum Glucose Level - Abnormal:  Goal: Ability to maintain appropriate glucose levels will improve  Description: Ability to maintain appropriate glucose levels will improve  1/4/2021 0051 by Angie Malik RN  Outcome: Ongoing     Problem: Sensory Perception - Impaired:  Goal: Ability to maintain a stable neurologic state will improve  Description: Ability to maintain a stable neurologic state will improve  1/4/2021 0051 by Angie Malik RN  Outcome: Ongoing     Problem: SAFETY  Goal: Free from accidental physical injury  1/4/2021 0051 by Angie Malik RN  Outcome: Ongoing     Problem: SAFETY  Goal: Free from intentional harm  1/4/2021 0051 by Angie Malik RN  Outcome: Ongoing     Problem: PAIN  Goal: Patient's pain/discomfort is manageable  1/4/2021 0051 by Angie Malik RN  Outcome: Ongoing     Problem: SKIN INTEGRITY  Goal: Skin integrity is maintained or improved  1/4/2021 0051 by Angie Malik RN  Outcome: Ongoing

## 2021-01-04 NOTE — PROGRESS NOTES
[facility]  Progress Note    Phoebe Shock  MRN: [id number]  Date: 1/4/2021  LOS: 4     Reason for F/U :   Diabetic foot infection/osteomyelitis    Impression :   1. 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 Leukocytosis secondary to above  3. Peripheral arterial disease  4. 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    Recommendations:   · Culture with group B streptococcus and E. Coli. · Continue to narrow the antimicrobial therapy to Rocephin and stop the daptomycin  · It is my understanding that the patient is scheduled for surgery per the podiatry service  · We will continue to monitor his progress and follow the wound healing. Infection Control Recommendations:   Contact precautions    Discharge Planning:   Estimated Length of IV antimicrobials:  To be determined  Patient will need Midline Catheter Insertion/ PICC line Insertion: No  Patient will need: Home IV , [place],  SNF,  LTAC: Undetermined  Patient willneed outpatient wound care: No    Medical Decision making / Summary of Stay:   Marco Cardenas is a 63y.o.-year-old male who was initially admitted on 12/31/2020.  Patient      Patient is known to our practice for multiple hospitalizations since July 2020. [facility] was initially admitted July 28, 2020 for left hallux gangrene/left foot cellulitis and plantar ulcerations.  He underwent left superficial femoral, popliteal, tibial, peroneal trunk, and posterior tibial artery atherectomy/balloon angioplasty 7/29/2020.  Patient underwent left TMA with Achilles tendon lengthening 8/5/2020. Lorelei Dejesus was discharged 8/7/2020 to rehab on doxycycline and Augmentin x1 week.     8/23/2020 patient returned to [facility] saying he never received wound care. [facility] was found to have wound dehiscence with concern for underlying osteomyelitis.  8/24/2020 he underwent incision and drainage of the left foot with revision of left foot TMA.  At that time, cultures grew VRE, Proteus mirabilis, and coagulase-negative Staphylococcus.  He was discharged on oral ciprofloxacin and linezolid through 9/25/2020 to complete a 4-week course.     11/3/2020 patient returned to the emergency department due to drainage from his foot.  Patient was found to have TMA stump infection with underlying osteomyelitis of the metatarsals, first and fifth metatarsals did probe to the bone.  At that time, we did discuss with podiatry as well as the patient that he would likely need to undergo left BKA; however, they both opted to treat patient with 6 weeks of IV antibiotics. Vandana Perez was discharged on 11/12/2020 on IV cefepime and linezolid to an extended care facility with plans to continue through 12/15/2020.  11/19/2020 patient left skilled nursing facility AMA.  IV cefepime was stopped at that time.  Patient continued on oral Zyvox. 11/23/2020 patient saw Dr. Jonathon Martínez in the office and patient was instructed to complete the oral linezolid.   Patient did follow-up again in the office 12/14/2020 and wound was smaller without any overt signs of infection at that time.     12/26/2020 patient presented to [facility] due to falling and having increasing pain and redness in the left foot.  WBC 16,900, CRP 67.5.  The wound probed to the bone at that time.  It was recommended for patient to be admitted, undergo debridement, and receive IV antibiotics.  Patient refused. Vandana Perez was prescribed doxycycline 100 mg p.o. twice a day x10 days and patient signed out [address]     Patient returned to the emergency department today due to pain in the left foot. Vandana Perez does tell me that he attempted to go to his podiatrist at the beginning of the week but could not get in. [address] arrival to the emergency department, it was noted that he had the same dressing on his foot that was placed in the emergency department last week. Mary Carmen Latif did rate pain 7 out of 10, throbbing and constant.  He has not had any documented fevers but does have hot and cold flashes.  Patient does not really have any other complaints aside from wanting pain medication.     Patient has been started on vancomycin and Zosyn.  We have been consulted to assist with antimicrobial therapy management.     2021 incision and drainage of the left foot with removal of nonviable bone and soft tissue    Current evaluation:2021    BP (!) 164/78   Pulse 109   Temp 97.9 °F (36.6 °C) (Oral)   Resp 18   Ht 6' 1\" (1.854 m)   Wt 149 lb (67.6 kg)   SpO2 98%   BMI 19.66 kg/m²     Temperature Range: Temp: 97.9 °F (36.6 °C) Temp  Av.1 °F (36.7 °C)  Min: 97.9 °F (36.6 °C)  Max: 98.2 °F (36.8 °C)  The patient is seen and evaluated at bedside and is awake and alert in no acute distress. No subjective fever or chills. No cough or shortness of breath. No abdominal pain nausea vomiting or diarrhea. Review of Systems   Constitutional: Negative. Respiratory: Negative. Cardiovascular: Negative. Gastrointestinal: Negative. Genitourinary: Negative. Musculoskeletal: Negative. Skin: Positive for wound. Allergic/Immunologic: Negative. Neurological: Negative. Physical Examination :     Physical Exam  Constitutional:       Appearance: He is well-developed. HENT:      Head: Normocephalic and atraumatic. Neck:      Musculoskeletal: Normal range of motion and neck supple. Cardiovascular:      Rate and Rhythm: Normal rate. Heart sounds: Normal heart sounds. No friction rub. No gallop. Pulmonary:      Effort: Pulmonary effort is normal.      Breath sounds: Normal breath sounds. No wheezing. Abdominal:      General: Bowel sounds are normal.      Palpations: Abdomen is soft. There is no mass. Tenderness:  There is no abdominal tenderness. Musculoskeletal: Normal range of motion. Lymphadenopathy:      Cervical: No cervical adenopathy. Skin:     Comments: The left foot dressing was not removed   Neurological:      Mental Status: He is alert and oriented to person, place, and time. Laboratory data:   I have independently reviewed the followinglabs:  CBC with Differential:   Recent Labs     01/03/21  0658 01/04/21  0723   WBC 12.6* 14.1*   HGB 10.3* 10.0*   HCT 35.1* 34.1*    436     BMP:   Recent Labs     01/03/21  0658 01/04/21  0723   * 136   K 4.2 3.8    103   CO2 23 23   BUN 14 12   CREATININE 0.83 0.64*     Hepatic Function Panel: No results for input(s): PROT, LABALBU, BILIDIR, IBILI, BILITOT, ALKPHOS, ALT, AST in the last 72 hours. No results found for: PROCAL  Lab Results   Component Value Date    .8 01/02/2021    CRP 60.2 12/31/2020    CRP 67.5 12/26/2020     Lab Results   Component Value Date    SEDRATE 70 (H) 12/31/2020         Lab Results   Component Value Date    DDIMER 0.39 06/29/2020    DDIMER 1.63 12/20/2017    DDIMER 0.68 12/25/2011     Lab Results   Component Value Date    FERRITIN 64 01/25/2014     No results found for: LDH  No results found for: FIBRINOGEN    Results in Past 30 Days  Result Component Current Result Ref Range Previous Result Ref Range   SARS-CoV-2      (12/31/2020)  Not in Time Range          (12/31/2020)       Not Detected (12/31/2020) Not Detected       Lab Results   Component Value Date    COVID19 Not Detected 12/31/2020    COVID19 Not Detected 11/10/2020    COVID19 Not Detected 08/23/2020    COVID19 Not Detected 07/29/2020       No results for input(s): VANCOTROUGH in the last 72 hours.     Imaging Studies:   MRI OF THE LEFT FOOT WITH AND WITHOUT CONTRAST, 1/4/2021 7:51 am  Impression   Acute osteomyelitis of the remaining 5th metatarsal.       Patchy bone marrow edema is seen in the remaining 1st-4th metatarsals as well   as the cuboid without definite marrow replacement.  This could represent   reactive bone marrow edema versus early acute osteomyelitis.       Nonenhancing 1.5 x 0.6 x 2.2 cm T2 hyperintense lesion at the plantar aspect   of the 4th metatarsal suspicious for an abscess.       Nonspecific generalized plantar muscle edema. Cultures:     Culture, Blood 1 [[id number]] Collected: 12/31/20 1502   Order Status: Completed Specimen: Blood Updated: 01/04/21 0007    Specimen Description . BLOOD    Special Requests 8ML LAC    Culture NO GROWTH 4 DAYS   Culture, Blood 1 [[id number]] Collected: 12/31/20 0753   Order Status: Completed Specimen: Blood Updated: 01/04/21 0007    Specimen Description . BLOOD    Special Requests 8ML LEFT AC    Culture NO GROWTH 4 DAYS   Culture, Blood 1 [[id number]] Collected: 12/31/20 0745   Order Status: Completed Specimen: Blood Updated: 01/04/21 0007    Specimen Description . BLOOD    Special Requests 11ML LINE DRAW    Culture NO GROWTH 4 DAYS   Culture, Anaerobic and Aerobic [[id number]] (Abnormal)  Collected: 12/31/20 1907   Order Status: Completed Specimen: Foot Updated: 01/03/21 1655    Specimen Description . FOOT LEFT    Special Requests NOT REPORTED    Direct Exam NO NEUTROPHILS SEEN     RARE GRAM POSITIVE COCCI IN PAIRSAbnormal     Culture STREPTOCOCCI, BETA HEMOLYTIC GROUP B LIGHT GROWTHAbnormal      ESCHERICHIA COLI LIGHT GROWTHAbnormal      NO ANAEROBIC ORGANISMS ISOLATED AT 3 DAYSAbnormal    Escherichia coli (2)    Antibiotic Interpretation DARIAN Status    amikacin   Preliminary     NOT REPORTED    ampicillin Sensitive  Preliminary     4   SUSCEPTIBLE   ampicillin-sulbactam   Preliminary     NOT REPORTED    aztreonam Sensitive  Preliminary     <=1   SUSCEPTIBLE   ceFAZolin Sensitive  Preliminary     <=4   SUSCEPTIBLE   cefepime   Preliminary     NOT REPORTED    cefTRIAXone Sensitive  Preliminary     <=1   SUSCEPTIBLE   ciprofloxacin Sensitive  Preliminary     <=0.25   SUSCEPTIBLE   ertapenem   Preliminary NOT REPORTED    Confirmatory Extended Spectrum Beta-Lactamase Negative NEGATIVE Preliminary    gentamicin Sensitive  Preliminary     <=1   SUSCEPTIBLE   meropenem   Preliminary     NOT REPORTED    nitrofurantoin   Preliminary     NOT REPORTED    tigecycline   Preliminary     NOT REPORTED    tobramycin Sensitive  Preliminary     <=1   SUSCEPTIBLE   trimethoprim-sulfamethoxazole Sensitive  Preliminary     <=20   SUSCEPTIBLE   piperacillin-tazobactam Sensitive  Preliminary     <=4   SUSCEPTIBLE       Culture, Wound [[id number]] (Abnormal) Collected: 12/31/20 1240   Order Status: Completed Specimen: Foot Updated: 01/02/21 0940    Specimen Description . FOOT SWAB    Special Requests NOT REPORTED    Direct Exam RARE NEUTROPHILSAbnormal      RARE GRAM POSITIVE RODSAbnormal      RARE GRAM NEGATIVE RODSAbnormal     Culture NORMAL SKIN FLORAAbnormal      STREPTOCOCCI, BETA HEMOLYTIC GROUP B LIGHT GROWTHAbnormal      Medications:      insulin glargine  60 Units Subcutaneous Nightly    sodium hypochlorite   Irrigation Daily    neomycin-bacitracin-polymyxin   Topical BID    apixaban  5 mg Oral BID    metFORMIN  500 mg Oral BID WC    nortriptyline  50 mg Oral Nightly    sodium chloride flush  10 mL Intravenous 2 times per day    insulin lispro  0-18 Units Subcutaneous TID WC    insulin lispro  0-9 Units Subcutaneous Nightly    cefepime  2 g Intravenous Q12H    daptomycin (CUBICIN) IVPB  6 mg/kg Intravenous Q24H    famotidine  20 mg Oral BID           [facility]  [address]  [employer] messaging  OFFICE: [phone]      Electronically signed by ElfegoT[facility] City HospitalTh Street, [place] on 1/4/2021 at 12:28 PM  Thank you for allowing us to participate in the care of this patient. Please call with questions.     This note iscreated with the assistance of a speech recognition program.  While intending to generate a document that actually reflects the content of the visit, the document can still have some errors including those of syntax andsound a like substitutions which may escape proof reading. In such instances, actual meaning can be extrapolated by contextual diversion.

## 2021-01-05 ENCOUNTER — ANESTHESIA (OUTPATIENT)
Dept: OPERATING ROOM | Age: 66
DRG: 464 | End: 2021-01-05
Payer: MEDICARE

## 2021-01-05 ENCOUNTER — ANESTHESIA EVENT (OUTPATIENT)
Dept: OPERATING ROOM | Age: 66
DRG: 464 | End: 2021-01-05
Payer: MEDICARE

## 2021-01-05 VITALS — SYSTOLIC BLOOD PRESSURE: 103 MMHG | OXYGEN SATURATION: 97 % | DIASTOLIC BLOOD PRESSURE: 60 MMHG

## 2021-01-05 LAB
ANION GAP SERPL CALCULATED.3IONS-SCNC: 7 MMOL/L (ref 9–17)
BUN BLDV-MCNC: 11 MG/DL (ref 8–23)
BUN/CREAT BLD: 16 (ref 9–20)
CALCIUM SERPL-MCNC: 8.4 MG/DL (ref 8.6–10.4)
CHLORIDE BLD-SCNC: 104 MMOL/L (ref 98–107)
CO2: 27 MMOL/L (ref 20–31)
CREAT SERPL-MCNC: 0.68 MG/DL (ref 0.7–1.2)
GFR AFRICAN AMERICAN: >60 ML/MIN
GFR NON-AFRICAN AMERICAN: >60 ML/MIN
GFR SERPL CREATININE-BSD FRML MDRD: ABNORMAL ML/MIN/{1.73_M2}
GFR SERPL CREATININE-BSD FRML MDRD: ABNORMAL ML/MIN/{1.73_M2}
GLUCOSE BLD-MCNC: 101 MG/DL (ref 75–110)
GLUCOSE BLD-MCNC: 308 MG/DL (ref 75–110)
GLUCOSE BLD-MCNC: 49 MG/DL (ref 75–110)
GLUCOSE BLD-MCNC: 71 MG/DL (ref 70–99)
GLUCOSE BLD-MCNC: 72 MG/DL (ref 75–110)
GLUCOSE BLD-MCNC: 83 MG/DL (ref 75–110)
GLUCOSE BLD-MCNC: 87 MG/DL (ref 75–110)
HCT VFR BLD CALC: 30.3 % (ref 40.7–50.3)
HCT VFR BLD CALC: 32.6 % (ref 40.7–50.3)
HEMOGLOBIN: 9.1 G/DL (ref 13–17)
HEMOGLOBIN: 9.6 G/DL (ref 13–17)
MAGNESIUM: 1.7 MG/DL (ref 1.6–2.6)
MCH RBC QN AUTO: 26.3 PG (ref 25.2–33.5)
MCHC RBC AUTO-ENTMCNC: 30 G/DL (ref 28.4–34.8)
MCV RBC AUTO: 87.6 FL (ref 82.6–102.9)
NRBC AUTOMATED: 0 PER 100 WBC
PDW BLD-RTO: 15.6 % (ref 11.8–14.4)
PLATELET # BLD: 415 K/UL (ref 138–453)
PMV BLD AUTO: 9 FL (ref 8.1–13.5)
POTASSIUM SERPL-SCNC: 3.3 MMOL/L (ref 3.7–5.3)
RBC # BLD: 3.46 M/UL (ref 4.21–5.77)
SODIUM BLD-SCNC: 138 MMOL/L (ref 135–144)
SURGICAL PATHOLOGY REPORT: NORMAL
WBC # BLD: 12.2 K/UL (ref 3.5–11.3)

## 2021-01-05 PROCEDURE — 2580000003 HC RX 258: Performed by: STUDENT IN AN ORGANIZED HEALTH CARE EDUCATION/TRAINING PROGRAM

## 2021-01-05 PROCEDURE — 85014 HEMATOCRIT: CPT

## 2021-01-05 PROCEDURE — 99232 SBSQ HOSP IP/OBS MODERATE 35: CPT | Performed by: NURSE PRACTITIONER

## 2021-01-05 PROCEDURE — 82947 ASSAY GLUCOSE BLOOD QUANT: CPT

## 2021-01-05 PROCEDURE — 2500000003 HC RX 250 WO HCPCS: Performed by: NURSE ANESTHETIST, CERTIFIED REGISTERED

## 2021-01-05 PROCEDURE — 3600000012 HC SURGERY LEVEL 2 ADDTL 15MIN: Performed by: STUDENT IN AN ORGANIZED HEALTH CARE EDUCATION/TRAINING PROGRAM

## 2021-01-05 PROCEDURE — 2500000003 HC RX 250 WO HCPCS: Performed by: STUDENT IN AN ORGANIZED HEALTH CARE EDUCATION/TRAINING PROGRAM

## 2021-01-05 PROCEDURE — 99232 SBSQ HOSP IP/OBS MODERATE 35: CPT | Performed by: INTERNAL MEDICINE

## 2021-01-05 PROCEDURE — 85027 COMPLETE CBC AUTOMATED: CPT

## 2021-01-05 PROCEDURE — 6360000002 HC RX W HCPCS: Performed by: INTERNAL MEDICINE

## 2021-01-05 PROCEDURE — 85018 HEMOGLOBIN: CPT

## 2021-01-05 PROCEDURE — 6370000000 HC RX 637 (ALT 250 FOR IP): Performed by: STUDENT IN AN ORGANIZED HEALTH CARE EDUCATION/TRAINING PROGRAM

## 2021-01-05 PROCEDURE — 2709999900 HC NON-CHARGEABLE SUPPLY: Performed by: STUDENT IN AN ORGANIZED HEALTH CARE EDUCATION/TRAINING PROGRAM

## 2021-01-05 PROCEDURE — 6370000000 HC RX 637 (ALT 250 FOR IP): Performed by: INTERNAL MEDICINE

## 2021-01-05 PROCEDURE — 7100000000 HC PACU RECOVERY - FIRST 15 MIN: Performed by: STUDENT IN AN ORGANIZED HEALTH CARE EDUCATION/TRAINING PROGRAM

## 2021-01-05 PROCEDURE — 3700000001 HC ADD 15 MINUTES (ANESTHESIA): Performed by: STUDENT IN AN ORGANIZED HEALTH CARE EDUCATION/TRAINING PROGRAM

## 2021-01-05 PROCEDURE — 80048 BASIC METABOLIC PNL TOTAL CA: CPT

## 2021-01-05 PROCEDURE — 7100000001 HC PACU RECOVERY - ADDTL 15 MIN: Performed by: STUDENT IN AN ORGANIZED HEALTH CARE EDUCATION/TRAINING PROGRAM

## 2021-01-05 PROCEDURE — 2580000003 HC RX 258: Performed by: INTERNAL MEDICINE

## 2021-01-05 PROCEDURE — 3700000000 HC ANESTHESIA ATTENDED CARE: Performed by: STUDENT IN AN ORGANIZED HEALTH CARE EDUCATION/TRAINING PROGRAM

## 2021-01-05 PROCEDURE — 36415 COLL VENOUS BLD VENIPUNCTURE: CPT

## 2021-01-05 PROCEDURE — 0QBP0ZZ EXCISION OF LEFT METATARSAL, OPEN APPROACH: ICD-10-PCS | Performed by: INTERNAL MEDICINE

## 2021-01-05 PROCEDURE — 3600000002 HC SURGERY LEVEL 2 BASE: Performed by: STUDENT IN AN ORGANIZED HEALTH CARE EDUCATION/TRAINING PROGRAM

## 2021-01-05 PROCEDURE — 1200000000 HC SEMI PRIVATE

## 2021-01-05 PROCEDURE — 83735 ASSAY OF MAGNESIUM: CPT

## 2021-01-05 PROCEDURE — 6360000002 HC RX W HCPCS: Performed by: NURSE ANESTHETIST, CERTIFIED REGISTERED

## 2021-01-05 PROCEDURE — 6360000002 HC RX W HCPCS: Performed by: STUDENT IN AN ORGANIZED HEALTH CARE EDUCATION/TRAINING PROGRAM

## 2021-01-05 RX ORDER — LIDOCAINE HYDROCHLORIDE 20 MG/ML
INJECTION, SOLUTION EPIDURAL; INFILTRATION; INTRACAUDAL; PERINEURAL PRN
Status: DISCONTINUED | OUTPATIENT
Start: 2021-01-05 | End: 2021-01-05 | Stop reason: SDUPTHER

## 2021-01-05 RX ORDER — HYDROCODONE BITARTRATE AND ACETAMINOPHEN 5; 325 MG/1; MG/1
2 TABLET ORAL PRN
Status: DISCONTINUED | OUTPATIENT
Start: 2021-01-05 | End: 2021-01-05 | Stop reason: HOSPADM

## 2021-01-05 RX ORDER — PROPOFOL 10 MG/ML
INJECTION, EMULSION INTRAVENOUS CONTINUOUS PRN
Status: DISCONTINUED | OUTPATIENT
Start: 2021-01-05 | End: 2021-01-05 | Stop reason: SDUPTHER

## 2021-01-05 RX ORDER — FENTANYL CITRATE 50 UG/ML
25 INJECTION, SOLUTION INTRAMUSCULAR; INTRAVENOUS EVERY 5 MIN PRN
Status: DISCONTINUED | OUTPATIENT
Start: 2021-01-05 | End: 2021-01-05 | Stop reason: HOSPADM

## 2021-01-05 RX ORDER — MAGNESIUM HYDROXIDE 1200 MG/15ML
LIQUID ORAL CONTINUOUS PRN
Status: COMPLETED | OUTPATIENT
Start: 2021-01-05 | End: 2021-01-05

## 2021-01-05 RX ORDER — DEXAMETHASONE SODIUM PHOSPHATE 10 MG/ML
INJECTION, SOLUTION INTRAMUSCULAR; INTRAVENOUS PRN
Status: DISCONTINUED | OUTPATIENT
Start: 2021-01-05 | End: 2021-01-05 | Stop reason: SDUPTHER

## 2021-01-05 RX ORDER — MIDAZOLAM HYDROCHLORIDE 1 MG/ML
INJECTION INTRAMUSCULAR; INTRAVENOUS PRN
Status: DISCONTINUED | OUTPATIENT
Start: 2021-01-05 | End: 2021-01-05 | Stop reason: SDUPTHER

## 2021-01-05 RX ORDER — ONDANSETRON 2 MG/ML
4 INJECTION INTRAMUSCULAR; INTRAVENOUS
Status: DISCONTINUED | OUTPATIENT
Start: 2021-01-05 | End: 2021-01-05 | Stop reason: HOSPADM

## 2021-01-05 RX ADMIN — MORPHINE SULFATE 4 MG: 4 INJECTION, SOLUTION INTRAMUSCULAR; INTRAVENOUS at 19:14

## 2021-01-05 RX ADMIN — POLYMYXIN B SULFATE, BACITRACIN ZINC, NEOMYCIN SULFATE: 5000; 3.5; 4 OINTMENT TOPICAL at 21:51

## 2021-01-05 RX ADMIN — LIDOCAINE HYDROCHLORIDE 50 MG: 20 INJECTION, SOLUTION EPIDURAL; INFILTRATION; INTRACAUDAL; PERINEURAL at 15:14

## 2021-01-05 RX ADMIN — FAMOTIDINE 20 MG: 20 TABLET, FILM COATED ORAL at 08:58

## 2021-01-05 RX ADMIN — METFORMIN HYDROCHLORIDE 500 MG: 500 TABLET ORAL at 17:49

## 2021-01-05 RX ADMIN — METOPROLOL TARTRATE 5 MG: 5 INJECTION INTRAVENOUS at 17:50

## 2021-01-05 RX ADMIN — DEXAMETHASONE SODIUM PHOSPHATE 10 MG: 10 INJECTION INTRAMUSCULAR; INTRAVENOUS at 15:22

## 2021-01-05 RX ADMIN — DEXTROSE MONOHYDRATE 12.5 G: 25 INJECTION, SOLUTION INTRAVENOUS at 11:27

## 2021-01-05 RX ADMIN — OXYCODONE AND ACETAMINOPHEN 2 TABLET: 5; 325 TABLET ORAL at 21:52

## 2021-01-05 RX ADMIN — PROPOFOL 50 MCG/KG/MIN: 10 INJECTION, EMULSION INTRAVENOUS at 15:14

## 2021-01-05 RX ADMIN — SODIUM CHLORIDE: 9 INJECTION, SOLUTION INTRAVENOUS at 17:53

## 2021-01-05 RX ADMIN — MIDAZOLAM 2 MG: 1 INJECTION INTRAMUSCULAR; INTRAVENOUS at 15:07

## 2021-01-05 RX ADMIN — GUAIFENESIN 600 MG: 600 TABLET, EXTENDED RELEASE ORAL at 08:58

## 2021-01-05 RX ADMIN — MORPHINE SULFATE 4 MG: 4 INJECTION, SOLUTION INTRAMUSCULAR; INTRAVENOUS at 02:12

## 2021-01-05 RX ADMIN — POLYMYXIN B SULFATE, BACITRACIN ZINC, NEOMYCIN SULFATE: 5000; 3.5; 4 OINTMENT TOPICAL at 09:00

## 2021-01-05 RX ADMIN — MORPHINE SULFATE 4 MG: 4 INJECTION, SOLUTION INTRAMUSCULAR; INTRAVENOUS at 23:19

## 2021-01-05 RX ADMIN — NORTRIPTYLINE HYDROCHLORIDE 50 MG: 25 CAPSULE ORAL at 21:52

## 2021-01-05 RX ADMIN — MORPHINE SULFATE 4 MG: 4 INJECTION, SOLUTION INTRAMUSCULAR; INTRAVENOUS at 13:08

## 2021-01-05 RX ADMIN — GUAIFENESIN 600 MG: 600 TABLET, EXTENDED RELEASE ORAL at 21:52

## 2021-01-05 RX ADMIN — APIXABAN 5 MG: 5 TABLET, FILM COATED ORAL at 21:52

## 2021-01-05 RX ADMIN — FAMOTIDINE 20 MG: 20 TABLET, FILM COATED ORAL at 21:52

## 2021-01-05 RX ADMIN — ANTACID TABLETS 500 MG: 500 TABLET, CHEWABLE ORAL at 21:51

## 2021-01-05 RX ADMIN — DEXTROSE MONOHYDRATE 100 ML/HR: 50 INJECTION, SOLUTION INTRAVENOUS at 12:42

## 2021-01-05 RX ADMIN — INSULIN GLARGINE 60 UNITS: 100 INJECTION, SOLUTION SUBCUTANEOUS at 21:52

## 2021-01-05 RX ADMIN — MORPHINE SULFATE 4 MG: 4 INJECTION, SOLUTION INTRAMUSCULAR; INTRAVENOUS at 08:59

## 2021-01-05 RX ADMIN — INSULIN LISPRO 6 UNITS: 100 INJECTION, SOLUTION INTRAVENOUS; SUBCUTANEOUS at 21:52

## 2021-01-05 RX ADMIN — POTASSIUM BICARBONATE 40 MEQ: 782 TABLET, EFFERVESCENT ORAL at 10:18

## 2021-01-05 RX ADMIN — CEFTRIAXONE 2 G: 2 INJECTION, POWDER, FOR SOLUTION INTRAMUSCULAR; INTRAVENOUS at 08:55

## 2021-01-05 ASSESSMENT — PULMONARY FUNCTION TESTS
PIF_VALUE: 1

## 2021-01-05 ASSESSMENT — PAIN DESCRIPTION - FREQUENCY
FREQUENCY: CONTINUOUS

## 2021-01-05 ASSESSMENT — LIFESTYLE VARIABLES: SMOKING_STATUS: 1

## 2021-01-05 ASSESSMENT — PAIN - FUNCTIONAL ASSESSMENT
PAIN_FUNCTIONAL_ASSESSMENT: PREVENTS OR INTERFERES SOME ACTIVE ACTIVITIES AND ADLS
PAIN_FUNCTIONAL_ASSESSMENT: PREVENTS OR INTERFERES SOME ACTIVE ACTIVITIES AND ADLS
PAIN_FUNCTIONAL_ASSESSMENT: ACTIVITIES ARE NOT PREVENTED

## 2021-01-05 ASSESSMENT — PAIN DESCRIPTION - DESCRIPTORS
DESCRIPTORS: SHARP
DESCRIPTORS: ACHING;DISCOMFORT
DESCRIPTORS: SHARP
DESCRIPTORS: SHARP

## 2021-01-05 ASSESSMENT — PAIN SCALES - GENERAL
PAINLEVEL_OUTOF10: 7
PAINLEVEL_OUTOF10: 0
PAINLEVEL_OUTOF10: 8
PAINLEVEL_OUTOF10: 8
PAINLEVEL_OUTOF10: 6

## 2021-01-05 ASSESSMENT — ENCOUNTER SYMPTOMS
ABDOMINAL PAIN: 0
ALLERGIC/IMMUNOLOGIC NEGATIVE: 1
COUGH: 1
VOMITING: 0
RESPIRATORY NEGATIVE: 1
GASTROINTESTINAL NEGATIVE: 1
NAUSEA: 0
SHORTNESS OF BREATH: 0

## 2021-01-05 ASSESSMENT — PAIN DESCRIPTION - PROGRESSION: CLINICAL_PROGRESSION: NOT CHANGED

## 2021-01-05 ASSESSMENT — PAIN DESCRIPTION - ONSET
ONSET: ON-GOING
ONSET: ON-GOING

## 2021-01-05 ASSESSMENT — PAIN DESCRIPTION - PAIN TYPE: TYPE: SURGICAL PAIN

## 2021-01-05 ASSESSMENT — PAIN DESCRIPTION - ORIENTATION: ORIENTATION: LEFT

## 2021-01-05 ASSESSMENT — PAIN DESCRIPTION - LOCATION
LOCATION: FOOT
LOCATION: FOOT

## 2021-01-05 NOTE — CARE COORDINATION
Scheduled for I&D lt foot per podiatry today at 4:30pm.  Switched to IV Rocephin daily. Continue to follow post op.

## 2021-01-05 NOTE — ANESTHESIA PRE PROCEDURE
Department of Anesthesiology  Preprocedure Note       Name:  Mar Curling   Age:  63 y.o.  :  1955                                          MRN:  [id number]         Date:  2021      Surgeon: Bright Sanchez):  Jeremiah Purdy DPM    Procedure: Procedure(s):  TOE AMPUTATION TRANSMETATARSAL    Medications prior to admission:   Prior to Admission medications    Medication Sig Start Date End Date Taking?  Authorizing Provider   doxycycline hyclate (VIBRA-TABS) 100 MG tablet Take 1 tablet by mouth 2 times daily for 10 days 20  [place] DPM   ondansetron (ZOFRAN ODT) 4 MG disintegrating tablet Take 1 tablet by mouth every 8 hours as needed for Nausea 20   Alta Casas MD   oxyCODONE-acetaminophen (PERCOCET) 5-325 MG per tablet TK 1 TO 2 TS PO Q 4 H PRN P 9/3/20   Historical Provider, MD   insulin lispro, 1 Unit Dial, (ADMELOG SOLOSTAR) 100 UNIT/ML SOPN Inject 10 Units into the skin 3 times daily 20   Albina Lizarraga DO   apixaban (ELIQUIS) 5 MG TABS tablet Take 1 tablet by mouth 2 times daily 20   Albina Lizarraga DO   Insulin Pen Needle 32G X 4 MM MISC 1 each by Does not apply route daily 20   Albina Lizarraga DO   blood glucose test strips (ASCENSIA AUTODISC VI;ONE TOUCH ULTRA TEST VI) strip Use with associated glucose meter to check fluctuating blood sugars BID 20   Ortega Canela MD   glucose monitoring kit (FREESTYLE) monitoring kit 1 kit by Does not apply route daily 20   Ortega Canela MD   albuterol sulfate HFA (VENTOLIN HFA) 108 (90 Base) MCG/ACT inhaler Inhale 2 puffs into the lungs every 6 hours as needed for Wheezing    Historical Provider, MD   nortriptyline (PAMELOR) 25 MG capsule Take 50 mg by mouth nightly    Historical Provider, MD   Insulin Degludec (TRESIBA FLEXTOUCH) 100 UNIT/ML SOPN Inject 70 Units into the skin nightly    Historical Provider, MD   Lancets MISC 1 each by Does not apply route 2 times daily 20   Albina Lizarraga DO TRUEplus Lancets 30G MISC Inject 1 each into the skin 3 times daily TO CHECK FLUCTUATING BLOOD SUGARS IE HYPERGLYCEMIA 6/19/20   [place] Blank, DO   metFORMIN (GLUCOPHAGE) 500 MG tablet Take 1 tablet by mouth 2 times daily (with meals) 3/9/20   Keo Blank, DO       Current medications:    No current facility-administered medications for this visit. No current outpatient medications on file.      Facility-Administered Medications Ordered in Other Visits   Medication Dose Route Frequency Provider Last Rate Last Admin    bupivacaine (MARCAINE) 10 mL, lidocaine 1 % 10 mL    PRN Lois Arts, DPM   15 mL at 01/05/21 1518    midazolam (VERSED) injection    PRN Cruz Enter, APRN - CRNA   2 mg at 01/05/21 1507    propofol injection    Continuous PRN Cruz Enter, APRN - CRNA 19.9 mL/hr at 01/05/21 1514 50 mcg/kg/min at 01/05/21 1514    dexamethasone (PF) (DECADRON) injection    PRN Rcuz Enter, APRN - CRNA   10 mg at 01/05/21 1522    lidocaine PF 2 % injection    PRN Cruz Enter, APRN - CRNA   50 mg at 01/05/21 1514    thrombin kit    PRN Lois Arts, DPM   10,000 Units at 01/05/21 1601    gelatin adsorbable (GELFOAM) sponge    PRN Lois Arts, DPM   1 each at 01/05/21 1601    sodium chloride 0.9 % irrigation    Continuous PRN Lois Arts, DPM   6,000 mL at 01/05/21 1625    [MAR Hold] guaiFENesin (MUCINEX) extended release tablet 600 mg  600 mg Oral BID Gail Mckinnon DO   600 mg at 01/05/21 0858    [MAR Hold] ipratropium-albuterol (DUONEB) nebulizer solution 1 ampule  1 ampule Inhalation Q4H PRN Gail Mckinnon DO        [facility] Hold] cefTRIAXone (ROCEPHIN) 2 g IVPB in D5W 50ml minibag  2 g Intravenous Q24H Yohannes Winston MD   Stopped at 01/05/21 0930    [MAR Hold] aluminum & magnesium hydroxide-simethicone (MAALOX) 200-200-20 MG/5ML suspension 30 mL  30 mL Oral Q6H PRN Gail Mckinnon DO   30 mL at 01/04/21 1533    [MAR Hold] insulin glargine (LANTUS) injection vial 60 Units  60 Units Subcutaneous Nightly Zainab Lynch, DO   60 Units at 01/04/21 2121    [MAR Hold] calcium carbonate (TUMS) chewable tablet 500 mg  500 mg Oral TID PRN MARCELO Antony CNP   500 mg at 01/03/21 2143    [MAR Hold] sodium hypochlorite (DAKINS) 0.125 % external solution   Irrigation Daily Heather Suarez DPM   Given at 01/04/21 0947    [MAR Hold] metoprolol (LOPRESSOR) injection 5 mg  5 mg Intravenous Q4H PRN MARCELO [place] CNP   5 mg at 01/02/21 2146    [MAR Hold] neomycin-bacitracin-polymyxin (NEOSPORIN) ointment   Topical BID Francesco Batista DPM   Given at 01/05/21 0900    [MAR Hold] albuterol sulfate  (90 Base) MCG/ACT inhaler 2 puff  2 puff Inhalation Q6H PRN Francesco Batista DPM        [MAR Hold] apixaban (ELIQUIS) tablet 5 mg  5 mg Oral BID Francesco Batista DPM   Stopped at 01/04/21 2100    [MAR Hold] metFORMIN (GLUCOPHAGE) tablet 500 mg  500 mg Oral BID WC Francesco Batista DPM   500 mg at 01/04/21 1732    [MAR Hold] nortriptyline (PAMELOR) capsule 50 mg  50 mg Oral Nightly Francesco Batista DPM   50 mg at 01/04/21 2121    [MAR Hold] oxyCODONE-acetaminophen (PERCOCET) 5-325 MG per tablet 2 tablet  2 tablet Oral Q6H PRN Francesco Batista DPM   2 tablet at 01/04/21 2121    [MAR Hold] 0.9 % sodium chloride infusion   Intravenous Continuous Francesco Batista DPM 75 mL/hr at 01/04/21 1534 New Bag at 01/04/21 1534    [MAR Hold] sodium chloride flush 0.9 % injection 10 mL  10 mL Intravenous 2 times per day Francesco Batista DPM   10 mL at 01/04/21 0758    [MAR Hold] sodium chloride flush 0.9 % injection 10 mL  10 mL Intravenous PRN Francesco Batista DPM        [facility] Hold] potassium chloride (KLOR-CON M) extended release tablet 40 mEq  40 mEq Oral PRN Francesco Batista DPM   40 mEq at 01/01/21 1404    Or    [MAR Hold] potassium bicarb-citric acid (EFFER-K) effervescent tablet 40 mEq  40 mEq Oral PRN Francesco Batista, DPM   40 mEq at 01/05/21 1018    Or    [MAR Hold] potassium chloride 10 mEq/100 mL IVPB (Peripheral Line)  10 mEq Intravenous PRN Berndemetrius Purdy, DPM        [facility] Hold] magnesium sulfate 1 g in dextrose 5% 100 mL IVPB  1 g Intravenous PRN Jeremiah Purdy, DPM        [MAR Hold] promethazine (PHENERGAN) tablet 12.5 mg  12.5 mg Oral Q6H PRN Jeremiah Purdy, DP        Or    [MAR Hold] ondansetron [facility]) injection 4 mg  4 mg Intravenous Q6H PRN Berndemetrius Purdy, DPM   4 mg at 01/04/21 0758    [MAR Hold] polyethylene glycol (GLYCOLAX) packet 17 g  17 g Oral Daily PRN Jeremiah Purdy, DPM        [MAR Hold] nicotine (NICODERM CQ) 21 MG/24HR 1 patch  1 patch Transdermal Daily PRN JAJA EduardoM        [MAR Hold] acetaminophen (TYLENOL) tablet 650 mg  650 mg Oral Q6H PRN Jeremiah Purdy, DP        Or    [MAR Hold] acetaminophen (TYLENOL) suppository 650 mg  650 mg Rectal Q6H PRN Jeremiah Purdy, DPM        [facility] Hold] morphine (PF) injection 2 mg  2 mg Intravenous Q2H PRN Jeremiah Purdy DPM   2 mg at 01/02/21 0956    Or    [MAR Hold] morphine sulfate (PF) injection 4 mg  4 mg Intravenous Q2H PRN Berndemetrius Purdy DPM   4 mg at 01/05/21 1308    [MAR Hold] insulin lispro (HUMALOG) injection vial 0-18 Units  0-18 Units Subcutaneous TID WC Jeremiah Purdy DPM   3 Units at 01/04/21 1733    [MAR Hold] insulin lispro (HUMALOG) injection vial 0-9 Units  0-9 Units Subcutaneous Nightly Bernida Kid, DPM   7 Units at 01/03/21 2143    [MAR Hold] glucose (GLUTOSE) 40 % oral gel 15 g  15 g Oral PRN Jeremiah Purdy, DPM        [facility] Hold] dextrose 50 % IV solution  12.5 g Intravenous PRN Jeremiah Purdy DPM   12.5 g at 01/05/21 1127    [MAR Hold] glucagon (rDNA) injection 1 mg  1 mg Intramuscular PRN Berndemetrius Purdy, DPM        [facility] Hold] dextrose 5 % solution  100 mL/hr Intravenous PRN Jeremiah Purdy  mL/hr at 01/05/21 1242 100 mL/hr at 01/05/21 1242    [MAR Hold] famotidine (PEPCID) tablet 20 mg  20 mg Oral BID Ann Puffer, DPM   20 mg at 01/05/21 0858    [MAR Hold] diphenhydrAMINE (BENADRYL) tablet 25 mg  25 mg Oral Nightly PRN Wyonia Puffer, DPM           Allergies:     Allergies   Allergen Reactions    Gabapentin Other (See Comments)     dizziness       Problem List:    Patient Active Problem List   Diagnosis Code    Type 2 diabetes mellitus with diabetic polyneuropathy, with long-term current use of insulin ([facility]) E11.42, Z79.4    Neuropathic pain, leg M79.2    Diabetic polyneuropathy ([address] E11.42    Allergic rhinitis J30.9    Tobacco dependence F17.200    Edentulous K08.109    Dysphagia R13.10    Lung nodules R91.8    Esophageal cancer ([facility]) C15.9    Fracture of humerus, left, closed S42.302A    Closed fracture of humerus S42.309A    DM type 2 with diabetic peripheral neuropathy ([facility]) E11.42    Chronic obstructive pulmonary disease ([facility]) J44.9    HLD (hyperlipidemia) E78.5    Gastroesophageal reflux disease K21.9    Chronic pain syndrome G89.4    Marijuana use F12.90    History of colon polyps Z86.010    Cellulitis of right heel L03.115    Functional diarrhea K59.1    Sepsis due to methicillin resistant Staphylococcus aureus (MRSA) ([facility]) A41.02    History of esophageal cancer Z85.01    Osteomyelitis, chronic, ankle or foot ([facility]) M86.679    Peripheral artery disease ([facility]) I73.9    Other pulmonary embolism without acute cor pulmonale ([facility]) I26.99    Carotid stenosis, asymptomatic, bilateral I65.23    Lower limb amputation status Z89.619    Fx humeral neck, right, closed, initial encounter S42.211A    Transient hypotension I95.9    Constipation due to opioid therapy K59.03, T40.2X5A    Acute kidney injury ([address] N17.9    Diabetic ulcer of left midfoot associated with type 2 diabetes mellitus, with fat layer exposed ([address] E11.621, U09.068    Complication of below knee amputation stump ([facility]) T87.9    COPD exacerbation ([facility]) J44.1    Hyponatremia E87.1    Pain, phantom limb ([address]  FOOT SURGERY Right 12/31/2016    I & D    FRACTURE SURGERY Left 9/5/2015    humerus left, left leg    IR INS PICC VAD W SQ PORT GREATER THAN 5  11/6/2020    IR INS PICC VAD W SQ PORT GREATER THAN 5 11/6/2020 MD [facility]ED[facility]    LEG AMPUTATION BELOW KNEE Right 01/21/2017    TOE AMPUTATION Right 2014    rt 3rd through 5th digits    TOE AMPUTATION Left 5/26/2016    left foot 5th toe    TOE AMPUTATION Left 8/5/2020    FOOT TRANSMETATARSAL  AMPUTATION - AND LEFT PECUTANEOUS TENDO ACHILLES LENGTHENING performed by Mayda Israel DPM at [address] TOE AMPUTATION Left 8/24/2020    REVISION  TRANSMETATARSAL AMPUTATION WITH DEBRIDEMENT. performed by Mayda Israel DPM at [address]      5/14/13- with dilation    VASCULAR SURGERY Right 01/16/2017    foot guillotine amputation       Social History:    Social History     Tobacco Use    Smoking status: Current Every Day Smoker     Packs/day: 1.00     Years: 30.00     Pack years: 30.00     Types: Cigarettes    Smokeless tobacco: Former User     Types: Chew     Quit date: 1980    Tobacco comment: pt states has cut down a lot. Had 1 cigarette yesterday   Substance Use Topics    Alcohol use: Yes     Alcohol/week: 0.0 standard drinks     Frequency: Patient refused     Drinks per session: Patient refused     Binge frequency: Patient refused     Comment:  states occ                                Ready to quit: Not Answered  Counseling given: Not Answered  Comment: pt states has cut down a lot. Had 1 cigarette yesterday      Vital Signs (Current): There were no vitals filed for this visit.                                            BP Readings from Last 3 Encounters:   01/05/21 (!) 120/51   01/05/21 (!) 109/58   01/01/21 (!) 98/53       NPO Status:                                                                                 BMI:   Wt Readings from Last 3 Encounters:   01/05/21 146 lb 1.6 oz (66.3 kg) 12/26/20 155 lb (70.3 kg)   12/18/20 155 lb (70.3 kg)     There is no height or weight on file to calculate BMI.    CBC:   Lab Results   Component Value Date    WBC 12.2 01/05/2021    RBC 3.46 01/05/2021    RBC 4.32 05/02/2012    HGB 9.1 01/05/2021    HCT 30.3 01/05/2021    MCV 87.6 01/05/2021    RDW 15.6 01/05/2021     01/05/2021     05/02/2012       CMP:   Lab Results   Component Value Date     01/05/2021    K 3.3 01/05/2021     01/05/2021    CO2 27 01/05/2021    BUN 11 01/05/2021    CREATININE 0.68 01/05/2021    GFRAA >60 01/05/2021    LABGLOM >60 01/05/2021    GLUCOSE 71 01/05/2021    GLUCOSE 129 05/02/2012    PROT 7.1 12/18/2020    CALCIUM 8.4 01/05/2021    BILITOT 0.25 12/18/2020    ALKPHOS 137 12/18/2020    AST 12 12/18/2020    ALT 7 12/18/2020       POC Tests:   Recent Labs     01/05/21  1444   POCGLU 72*       Coags:   Lab Results   Component Value Date    PROTIME 12.8 12/26/2020    PROTIME 10.5 05/02/2012    INR 1.0 12/26/2020    APTT 26.4 12/26/2020       HCG (If Applicable): No results found for: PREGTESTUR, PREGSERUM, HCG, HCGQUANT     ABGs: No results found for: PHART, PO2ART, CCG2NWB, KZH6EYQ, BEART, S9BEUSPZ     Type & Screen (If Applicable):  No results found for: LABABO, LABRH    Drug/Infectious Status (If Applicable):  No results found for: HIV, HEPCAB    COVID-19 Screening (If Applicable):   Lab Results   Component Value Date    COVID19 Not Detected 12/31/2020    COVID19 Not Detected 08/23/2020         Anesthesia Evaluation   no history of anesthetic complications:   Airway: Mallampati: I  TM distance: >3 FB   Neck ROM: full  Mouth opening: > = 3 FB Dental:    (+) edentulous      Pulmonary:   (+) COPD:  current smoker                           Cardiovascular:    (+) hypertension:, hyperlipidemia                  Neuro/Psych:               GI/Hepatic/Renal:   (+) GERD:,           Endo/Other:    (+) DiabetesType II DM, , .                 Abdominal:           Vascular: + PE.                                       Anesthesia Plan      general     ASA 3       Induction: intravenous. Anesthetic plan and risks discussed with patient.                       Alondra Banuelos MD   1/5/2021

## 2021-01-05 NOTE — PROGRESS NOTES
[facility]  Progress Note    Trenton Holguin  MRN: [id number]  Date: 1/5/2021  LOS: 5     Reason for F/U :   Diabetic foot infection/osteomyelitis    Impression :   1. Left transmetatarsal amputation stump infection with underlying osteomyelitis of the first and fifth metatarsals concern for abscess  · Status post incision and drainage of the left foot with removal of nonviable bone and soft tissue  · Wound culture with group B streptococcus and E. coli  · Noncompliance  2. Leukocytosis secondary to above  3. Peripheral arterial disease  4. 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 · We will follow the operative findings and operative culture data and decide on antibiotics at that time    Infection Control Recommendations:   Contact precautions    Discharge Planning:   Estimated Length of IV antimicrobials:  To be determined  Patient will need Midline Catheter Insertion/ PICC line Insertion: No  Patient will need: Home IV , [place],  SNF,  LTAC: Undetermined  Patient willneed outpatient wound care: No    Medical Decision making / Summary of Stay:   Beata Cardenas is a 63y.o.-year-old male who was initially admitted on 12/31/2020.  Patient      Patient is known to our practice for multiple hospitalizations since July 2020. Palmira Barry was initially admitted July 28, 2020 for left hallux gangrene/left foot cellulitis and plantar ulcerations.  He underwent left superficial femoral, popliteal, tibial, peroneal trunk, and posterior tibial artery atherectomy/balloon angioplasty 7/29/2020.  Patient underwent left TMA with Achilles tendon lengthening 8/5/2020. Holger Ham was discharged 8/7/2020 to rehab on doxycycline and Augmentin x1 week.     8/23/2020 patient returned to [facility] saying he never received wound care. Palmira Barry was found to have wound dehiscence with concern for underlying osteomyelitis.  8/24/2020 he underwent incision and drainage of the left foot with revision of left foot TMA.  At that time, cultures grew VRE, Proteus mirabilis, and coagulase-negative Staphylococcus.  He was discharged on oral ciprofloxacin and linezolid through 9/25/2020 to complete a 4-week course.     11/3/2020 patient returned to the emergency department due to drainage from his foot.  Patient was found to have TMA stump infection with underlying osteomyelitis of the metatarsals, first and fifth metatarsals did probe to the bone.  At that time, we did discuss with podiatry as well as the patient that he would likely need to undergo left BKA; however, they both opted to treat patient with 6 weeks of IV antibiotics. Faith Ashby was discharged on 11/12/2020 on IV cefepime and linezolid to an extended care facility with plans to continue through 12/15/2020.  11/19/2020 patient left skilled nursing facility AMA.  IV cefepime was stopped at that time.  Patient continued on oral Zyvox. 11/23/2020 patient saw Dr. Radha Umanzor in the office and patient was instructed to complete the oral linezolid.   Patient did follow-up again in the office 12/14/2020 and wound was smaller without any overt signs of infection at that time.     12/26/2020 patient presented to [facility] due to falling and having increasing pain and redness in the left foot.  WBC 16,900, CRP 67.5.  The wound probed to the bone at that time.  It was recommended for patient to be admitted, undergo debridement, and receive IV antibiotics.  Patient refused. Faith Ashby was prescribed doxycycline 100 mg p.o. twice a day x10 days and patient signed out [address]     Patient returned to the emergency department today due to pain in the left foot. Faith Ashby does tell me that he attempted to go to his podiatrist at the beginning of the week but could not get in. [address] arrival to the emergency department, it was noted that he had the same dressing on his foot that was placed in the emergency department last week. [facility] did rate pain 7 out of 10, throbbing and constant.  He has not had any documented fevers but does have hot and cold flashes.  Patient does not really have any other complaints aside from wanting pain medication.     Patient has been started on vancomycin and Zosyn.  We have been consulted to assist with antimicrobial therapy management.     2021 incision and drainage of the left foot with removal of nonviable bone and soft tissue    Current evaluation:2021    BP (!) 120/51   Pulse 105   Temp 97.3 °F (36.3 °C) (Oral)   Resp 16   Ht 6' 1\" (1.854 m)   Wt 146 lb 1.6 oz (66.3 kg)   SpO2 97%   BMI 19.28 kg/m²     Temperature Range: Temp: 97.3 °F (36.3 °C) Temp  Av.2 °F (36.8 °C)  Min: 97.3 °F (36.3 °C)  Max: 99.3 °F (37.4 °C)  The patient is seen and evaluated at bedside he is awake and alert in no acute distress. No subjective fever or chills. No abdominal pain nausea vomiting or diarrhea. The foot dressing has just been done by the podiatry service at the time of my evaluation. Review of Systems   Constitutional: Negative. Respiratory: Negative. Cardiovascular: Negative. Gastrointestinal: Negative. Genitourinary: Negative. Musculoskeletal: Negative. Skin: Positive for wound. Allergic/Immunologic: Negative. Neurological: Negative. Physical Examination :     Physical Exam  Constitutional:       Appearance: He is well-developed. HENT:      Head: Normocephalic and atraumatic. Neck:      Musculoskeletal: Normal range of motion and neck supple. Cardiovascular:      Rate and Rhythm: Normal rate. Heart sounds: Normal heart sounds. No friction rub. No gallop. Pulmonary:      Effort: Pulmonary effort is normal.      Breath sounds: Normal breath sounds. No wheezing. Abdominal:      General: Bowel sounds are normal.      Palpations: Abdomen is soft.  There is no mass. Tenderness: There is no abdominal tenderness. Musculoskeletal: Normal range of motion. Lymphadenopathy:      Cervical: No cervical adenopathy. Skin:     Comments: The left foot dressing was not removed   Neurological:      Mental Status: He is alert and oriented to person, place, and time. Laboratory data:   I have independently reviewed the followinglabs:  CBC with Differential:   Recent Labs     01/04/21 0723 01/05/21 0627   WBC 14.1* 12.2*   HGB 10.0* 9.1*   HCT 34.1* 30.3*    415     BMP:   Recent Labs     01/04/21 0723 01/05/21 0627    138   K 3.8 3.3*    104   CO2 23 27   BUN 12 11   CREATININE 0.64* 0.68*   MG  --  1.7     Hepatic Function Panel: No results for input(s): PROT, LABALBU, BILIDIR, IBILI, BILITOT, ALKPHOS, ALT, AST in the last 72 hours. No results found for: PROCAL  Lab Results   Component Value Date    .8 01/02/2021    CRP 60.2 12/31/2020    CRP 67.5 12/26/2020     Lab Results   Component Value Date    SEDRATE 70 (H) 12/31/2020         Lab Results   Component Value Date    DDIMER 0.39 06/29/2020    DDIMER 1.63 12/20/2017    DDIMER 0.68 12/25/2011     Lab Results   Component Value Date    FERRITIN 64 01/25/2014     No results found for: LDH  No results found for: FIBRINOGEN    Results in Past 30 Days  Result Component Current Result Ref Range Previous Result Ref Range   SARS-CoV-2      (12/31/2020)  Not in Time Range          (12/31/2020)       Not Detected (12/31/2020) Not Detected       Lab Results   Component Value Date    COVID19 Not Detected 12/31/2020    COVID19 Not Detected 11/10/2020    COVID19 Not Detected 08/23/2020    COVID19 Not Detected 07/29/2020       No results for input(s): VANCOTROUGH in the last 72 hours.     Imaging Studies:   MRI OF THE LEFT FOOT WITH AND WITHOUT CONTRAST, 1/4/2021 7:51 am  Impression   Acute osteomyelitis of the remaining 5th metatarsal.       Patchy bone marrow edema is seen in the remaining 1st-4th metatarsals as well   as the cuboid without definite marrow replacement.  This could represent   reactive bone marrow edema versus early acute osteomyelitis.       Nonenhancing 1.5 x 0.6 x 2.2 cm T2 hyperintense lesion at the plantar aspect   of the 4th metatarsal suspicious for an abscess.       Nonspecific generalized plantar muscle edema. Cultures:     Culture, Blood 1 [[id number]] Collected: 12/31/20 1502   Order Status: Completed Specimen: Blood Updated: 01/05/21 0004    Specimen Description . BLOOD    Special Requests 8ML LAC    Culture NO GROWTH 5 DAYS   Culture, Blood 1 [[id number]] Collected: 12/31/20 0753   Order Status: Completed Specimen: Blood Updated: 01/05/21 0004    Specimen Description . BLOOD    Special Requests 8ML LEFT AC    Culture NO GROWTH 5 DAYS   Culture, Blood 1 [[id number]] Collected: 12/31/20 0745   Order Status: Completed Specimen: Blood Updated: 01/05/21 0004    Specimen Description . BLOOD    Special Requests 11ML LINE DRAW    Culture NO GROWTH 5 DAYS     Culture, Anaerobic and Aerobic [[id number]] (Abnormal)  Collected: 12/31/20 1907   Order Status: Completed Specimen: Foot Updated: 01/03/21 1655    Specimen Description . FOOT LEFT    Special Requests NOT REPORTED    Direct Exam NO NEUTROPHILS SEEN     RARE GRAM POSITIVE COCCI IN PAIRSAbnormal     Culture STREPTOCOCCI, BETA HEMOLYTIC GROUP B LIGHT GROWTHAbnormal      ESCHERICHIA COLI LIGHT GROWTHAbnormal      NO ANAEROBIC ORGANISMS ISOLATED AT 3 DAYSAbnormal    Escherichia coli (2)    Antibiotic Interpretation DARIAN Status    amikacin   Preliminary     NOT REPORTED    ampicillin Sensitive  Preliminary     4   SUSCEPTIBLE   ampicillin-sulbactam   Preliminary     NOT REPORTED    aztreonam Sensitive  Preliminary     <=1   SUSCEPTIBLE   ceFAZolin Sensitive  Preliminary     <=4   SUSCEPTIBLE   cefepime   Preliminary     NOT REPORTED    cefTRIAXone Sensitive  Preliminary     <=1   SUSCEPTIBLE   ciprofloxacin Sensitive Preliminary     <=0.25   SUSCEPTIBLE   ertapenem   Preliminary     NOT REPORTED    Confirmatory Extended Spectrum Beta-Lactamase Negative NEGATIVE Preliminary    gentamicin Sensitive  Preliminary     <=1   SUSCEPTIBLE   meropenem   Preliminary     NOT REPORTED    nitrofurantoin   Preliminary     NOT REPORTED    tigecycline   Preliminary     NOT REPORTED    tobramycin Sensitive  Preliminary     <=1   SUSCEPTIBLE   trimethoprim-sulfamethoxazole Sensitive  Preliminary     <=20   SUSCEPTIBLE   piperacillin-tazobactam Sensitive  Preliminary     <=4   SUSCEPTIBLE       Culture, Wound [[id number]] (Abnormal) Collected: 12/31/20 1240   Order Status: Completed Specimen: Foot Updated: 01/02/21 0901    Specimen Description . FOOT SWAB    Special Requests NOT REPORTED    Direct Exam RARE NEUTROPHILSAbnormal      RARE GRAM POSITIVE RODSAbnormal      RARE GRAM NEGATIVE RODSAbnormal     Culture NORMAL SKIN FLORAAbnormal      STREPTOCOCCI, BETA HEMOLYTIC GROUP B LIGHT GROWTHAbnormal      Medications:      guaiFENesin  600 mg Oral BID    cefTRIAXone (ROCEPHIN) IV  2 g Intravenous Q24H    insulin glargine  60 Units Subcutaneous Nightly    sodium hypochlorite   Irrigation Daily    neomycin-bacitracin-polymyxin   Topical BID    apixaban  5 mg Oral BID    metFORMIN  500 mg Oral BID WC    nortriptyline  50 mg Oral Nightly    sodium chloride flush  10 mL Intravenous 2 times per day    insulin lispro  0-18 Units Subcutaneous TID WC    insulin lispro  0-9 Units Subcutaneous Nightly    famotidine  20 mg Oral BID           [facility]  [address]  Perfect Serve messaging  OFFICE: [phone]      Electronically signed by [address] [place] [address] Street, MD on 1/5/2021 at 12:13 PM  Thank you for allowing us to participate in the care of this patient. Please call with questions.     This note iscreated with the assistance of a speech recognition program.  While intending to generate a document that actually
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
## 2021-01-05 NOTE — ANESTHESIA POSTPROCEDURE EVALUATION
Department of Anesthesiology  Postprocedure Note    Patient: Walter Hurley  MRN: [id number]  YOB: 1955  Date of evaluation: 1/5/2021  Time:  6:41 PM     Procedure Summary     Date: 01/05/21 Room / Location: [address]    Anesthesia Start: 1988 Anesthesia Stop: 7333    Procedure: LEFT FOOT DEBRIDEMENT WITH REMOVAL ALL NON VIABLE SOFT TISSUE AND BONE (Left Foot) Diagnosis: (DX OSTEOMYELITIS AND ABSCESS LEFT FOOT)    Surgeons: Leena Helton DPM Responsible Provider: Alden Calles MD    Anesthesia Type: general ASA Status: 3          Anesthesia Type: No value filed. Lynne Phase I: Lynne Score: 9    Lynne Phase II:      Last vitals: Reviewed and per EMR flowsheets.        Anesthesia Post Evaluation    Patient location during evaluation: PACU  Patient participation: complete - patient participated  Level of consciousness: awake  Airway patency: patent  Nausea & Vomiting: no nausea  Complications: no  Cardiovascular status: blood pressure returned to baseline  Respiratory status: acceptable  Hydration status: euvolemic

## 2021-01-05 NOTE — PLAN OF CARE
Problem: PAIN  Goal: Patient's pain/discomfort is manageable  1/5/2021 0918 by Huan Charles RN  Outcome: Ongoing  Note: Pt reports adequate pain control with current meds  1/5/2021 0114 by Dennie Sobers, RN  Outcome: Ongoing  Note: Pain level assessment complete.    Patient educated on pain scale and control interventions  PRN pain medication given per patient request  Patient instructed to call out with new onset of pain or unrelieved pain

## 2021-01-05 NOTE — BRIEF OP NOTE
PODIATRY BRIEF OP NOTE    PATIENT NAME: Carli Frost  YOB: 1955  -  63 y.o. male  MRN: [id number]  DATE: 1/5/2021  BILLING #: [id number]    Surgeon(s):  María Elena Albert DPM     ASSISTANTS: John Man DPM  PGY-1    PRE-OP DIAGNOSIS:   1. Osteomyelitis of the fifth metatarsal  2. Multiple abscess left foot  3. Cellulitis left foot    POST-OP DIAGNOSIS: Same as above. PROCEDURE:   1. Incision and debridement of left foot  2. Multiple incision and drainage left foot  3. Removal of fifth metatarsal left foot    ANESTHESIA: MAC with local    HEMOSTASIS:   Direct pressure, thrombin    ESTIMATED BLOOD LOSS:  <50cc    MATERIALS: N/A  * No implants in log *    INJECTABLES: 15cc of 1:1 mixture of 1%lidocaine plain and 0.5% bupivacaine plain      SPECIMEN: None  * No specimens in log *    COMPLICATIONS: Abscesses tracking into multiple tissue planes, tracking up the peroneal tendons to the ankle. FINDINGS: Multiple abscesses and loculations of bacteria were found tracking along the tendons and separately across the plantar calcaneus. Incisions were extended and pulse lavage was used to thoroughly irrigate. During this procedure, it was determined that the foot is no longer salvageable and patient will be referred to vascular for for further work-up and evaluation. The patient was counseled at length about the risks of neema Covid-19 during their perioperative period and any recovery window from their procedure. The patient was made aware that neema Covid-19  may worsen their prognosis for recovering from their procedure  and lend to a higher morbidity and/or mortality risk. All material risks, benefits, and reasonable alternatives including postponing the procedure were discussed. The patient does wish to proceed with the procedure at this time.     John Man DPM   Podiatric Medicine & Surgery   1/5/2021 at 4:45 PM

## 2021-01-05 NOTE — PLAN OF CARE
Problem: Discharge Planning:  Goal: Discharged to appropriate level of care  Description: Discharged to appropriate level of care  Outcome: Ongoing     Problem: Serum Glucose Level - Abnormal:  Goal: Ability to maintain appropriate glucose levels will improve  Description: Ability to maintain appropriate glucose levels will improve  Outcome: Ongoing     Problem: Sensory Perception - Impaired:  Goal: Ability to maintain a stable neurologic state will improve  Description: Ability to maintain a stable neurologic state will improve  Outcome: Ongoing     Problem: SAFETY  Goal: Free from accidental physical injury  1/5/2021 0114 by Milton Post RN  Outcome: Ongoing     Problem: SAFETY  Goal: Free from intentional harm  Outcome: Ongoing     Problem: SKIN INTEGRITY  Goal: Skin integrity is maintained or improved  Outcome: Ongoing     Problem: Skin Integrity:  Goal: Absence of new skin breakdown  Description: Absence of new skin breakdown  Outcome: Ongoing

## 2021-01-05 NOTE — PROGRESS NOTES
Progress Note  Podiatric Medicine and Surgery     Subjective     CC: Left foot infection    Patient seen and examined at bedside. S/P I&D of left foot (DOS 1/1/2021)  No acute events overnight. Afebrile, vital signs stable   Leukocytosis trending down 28.2>22.4>17.0>14.1>12.2  Patient NPO for surgery this afternoon. HPI :  Anne Cardenas is a 63 y. o. male seen at [facility] the floor for a wound on the left foot. The patient was last seen in the emergency department at [phone] on 12/26/2020, at which time patient refused to be admitted so he was given a follow-up for podiatry for 12/28/2020, an appointment which patient missed. The patient is well known to Dr. Radha Melendez who performed an I&D and a revision of TMA 8/24/2020 of left lower extremity. Patient is also well known to Dr. Marilee Schmitt. Patient states he has been compliant with the antibiotic prescription given at his last emergency department visit. 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 Patient states he wears a diabetic shoe to the left lower extremity when ambulating. Of note, patient has a prior BKA to [facility] and reports he has fallen numerous times over the last week because of issues with his right lower extremity prosthetic. Patient denies hitting his head, losing consciousness or suffering any trauma with the falls. Patient admits to smoking on & off, up to 1 pack per day when smoking heavily. The patient denies any other pedal complaints at this time.     PCP is Endy Lim DO    ROS: Denies N/V/F/C/SOB/CP. Otherwise negative except at stated in the HPI.      Medications:  Scheduled Meds:   guaiFENesin  600 mg Oral BID    cefTRIAXone (ROCEPHIN) IV  2 g Intravenous Q24H    insulin glargine  60 Units Subcutaneous Nightly    sodium hypochlorite   Irrigation Daily    neomycin-bacitracin-polymyxin   Topical BID    apixaban  5 mg Oral BID    metFORMIN  500 mg Oral BID     nortriptyline  50 mg Oral Nightly    sodium chloride flush  10 mL Intravenous 2 times per day    insulin lispro  0-18 Units Subcutaneous TID WC    insulin lispro  0-9 Units Subcutaneous Nightly    famotidine  20 mg Oral BID       Continuous Infusions:   sodium chloride 75 mL/hr at 21 1534    dextrose         PRN Meds:ipratropium-albuterol, aluminum & magnesium hydroxide-simethicone, calcium carbonate, metoprolol, albuterol sulfate HFA, oxyCODONE-acetaminophen, sodium chloride flush, potassium chloride **OR** potassium alternative oral replacement **OR** potassium chloride, magnesium sulfate, promethazine **OR** ondansetron, polyethylene glycol, nicotine, acetaminophen **OR** acetaminophen, morphine **OR** morphine, glucose, dextrose, glucagon (rDNA), dextrose, diphenhydrAMINE    Objective     Vitals:  Patient Vitals for the past 8 hrs:   BP Temp Temp src Pulse Resp SpO2 Weight   21 0808 (!) 150/72 97.7 °F (36.5 °C) Oral 107 16 96 % --   21 06 -- -- -- -- -- -- 146 lb 1.6 oz (66.3 kg)   21 0529 138/79 97.9 °F (36.6 °C) Oral 108 16 97 % --     Average, Min, and Max for last 24 hours Vitals:  TEMPERATURE:  Temp  Av.3 °F (36.8 °C)  Min: 97.7 °F (36.5 °C)  Max: 99.3 °F (37.4 °C)    RESPIRATIONS RANGE: Resp  Av.3  Min: 16  Max: 18    PULSE RANGE: Pulse  Av.7  Min: 101  Max: 109    BLOOD PRESSURE RANGE:  Systolic (06ROO), WIV:145 , Min:119 , WP   ; Diastolic (53FLJ), PAUL:08, Min:55, Max:79      PULSE OXIMETRY RANGE: SpO2  Av %  Min: 96 %  Max: 98 %    I/O last 3 completed shifts:   In: 1968.3 [I.V.:1868.3; IV Piggyback:100]  Out: -     CBC:  Recent Labs     21  0658 21  0721   WBC 12.6* 14.1* 12.2*   HGB 10.3* 10.0* 9.1*   HCT 35.1* 34.1* 30.3*    436 415        BMP:  Recent Labs     21  0658 21  0721   * 136 138   K 4.2 3.8 3.3*    103 104   CO2 23 23 27   BUN 14 12 11   CREATININE 0.83 0.64* 0.68*   GLUCOSE 162* 93 71   CALCIUM 8.4* 8.4* 8.4*        Coags:  No results for input(s): APTT, PROT, INR in the last 72 hours. Lab Results   Component Value Date    SEDRATE 70 (H) 12/31/2020     No results for input(s): CRP in the last 72 hours. Left Lower Extremity Physical Exam:     Vascular: DP and PT pulses are Non-palpable, PT and AT audible on doppler.  CFT <3 seconds to dorsum of foot.  Hair growth is absent to the foot.  Moderate non-pitting edema to the left lower extremity.       Neuro: Saph/sural/SP/DP/plantar sensation diminished to light touch.     Musculoskeletal: Muscle strength is 4/5 against resistance to lower extremity muscle groups. Gross deformity is present, right LE BKA & left LE TMA. TTP absent to left foot, ankle, and calf.      Dermatologic:  Ulcer #1 located level of 5th metatarsal TMA site and measures approximately 5.0cm x 3.0 x 4.5cm deep. the wound base is fibro-granular. Periwound skin with minimal erythema.  Approximately 1 cc of candido-purulent drainage expressed from the plantar aspect of the wound with no associated mal odor. Mild erythema present to dorso-lateral foot below level of ankle with associated mild increase in warmth. Scant purulent drainage also able to be expressed from the dorsal aspect of the wound with no associated mal odor. 5th metatarsal is exposed. No crepitus or induration pre- or post-debridement.      Ulcer #2 located level of first metatarsal TMA site, distally and measures approximately 0.4 cm x 04 cm x 0.2 cm. The wound base is fibrogranular. Periwound skin is hyperkeratotic. No drainage with no associated increase in warmth. Mild periwound erythema. Does not probe to bone. Does not sinus track, undermine, or have fluctuance. No crepitus or induration.     Superficial, dry, intact eschar present to dorsal foot and medial foot. No drainage noted, does not probe. No fluctuance, crepitus, or induration noted.  Minimal associated erythema with minimal increase in warmth.     Clinical Images:                Imaging:   XR CHEST PORTABLE   Final Result   No acute cardiopulmonary abnormality. MRI FOOT LEFT W WO CONTRAST   Preliminary Result   Acute osteomyelitis of the remaining 5th metatarsal.      Patchy bone marrow edema is seen in the remaining 1st-4th metatarsals as well   as the cuboid without definite marrow replacement. This could represent   reactive bone marrow edema versus early acute osteomyelitis. Nonenhancing 1.5 x 0.6 x 2.2 cm T2 hyperintense lesion at the plantar aspect   of the 4th metatarsal suspicious for an abscess. Nonspecific generalized plantar muscle edema. VL Arterial PVR Lower   Final Result      XR FOOT LEFT (MIN 3 VIEWS)   Final Result   Ulceration about the remaining 5th metatarsal appears larger in the interval.   While the underlying bone appears unchanged in the interval, osteomyelitis is   not excluded. No soft tissue gas identified. Cultures:   Culture, Anaerobic and Aerobic [[id number]] (Abnormal)  Collected: 12/31/20 1907   Order Status: Completed Specimen: Foot Updated: 01/03/21 1655    Specimen Description . FOOT LEFT    Special Requests NOT REPORTED    Direct Exam NO NEUTROPHILS SEEN     RARE GRAM POSITIVE COCCI IN PAIRSAbnormal     Culture STREPTOCOCCI, BETA HEMOLYTIC GROUP B LIGHT GROWTHAbnormal      ESCHERICHIA COLI LIGHT GROWTHAbnormal      NO ANAEROBIC ORGANISMS ISOLATED AT 3 DAYSAbnormal    Culture, Wound [[id number]] (Abnormal) Collected: 12/31/20 1240   Order Status: Completed Specimen: Foot Updated: 01/02/21 0940    Specimen Description . FOOT SWAB    Special Requests NOT REPORTED    Direct Exam RARE NEUTROPHILSAbnormal      RARE GRAM POSITIVE RODSAbnormal      RARE GRAM NEGATIVE RODSAbnormal     Culture NORMAL SKIN FLORAAbnormal      STREPTOCOCCI, BETA HEMOLYTIC GROUP B LIGHT GROWTHAbnormal             Assessment   Bhavana Garvin is a 63 y.o. male with 1. Osteomyelitis 5th metatarsal, left foot  2. Cellulitis, Left LE  3. S/p revision of TMA & I&D (DOS: 8/24/2020), LLE  4. 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 Previous BKA, RLE   6. COPD  7. CAD    Principal Problem:    Diabetic ulcer of left midfoot associated with type 2 diabetes mellitus, with fat layer exposed ([address]  Active Problems:    DM type 2 with diabetic peripheral neuropathy (HCC)    Chronic obstructive pulmonary disease (HCC)    HLD (hyperlipidemia)    Gastroesophageal reflux disease    Peripheral artery disease (HCC)    COPD exacerbation (HCC)    Below-knee amputation of right lower extremity ([address]    Essential hypertension    Uncontrolled type 2 diabetes mellitus with hyperglycemia (HCC)    Anemia, normocytic normochromic    Osteomyelitis of metatarsals of left foot (HCC)    Diabetic foot infection (HCC)    Diabetic infection of left foot (HCC)    Hypocalcemia    Hypokalemia  Resolved Problems:    * No resolved hospital problems. *       Plan     · Patient examined and evaluated at bedside. · Treatment options discussed in detail with the patient. · MRI reviewed. · Educated patient on the importance of smoking cessation to allow for optimal healing. · Educated patient on the importance of tight glycemic control for optimal healing. · Medical management per primary team.  · ABx per ID : Ceftriaxne  · Per vascular: continue dressing changes. If wound does not heal will need angiography for further evaluation. · Patient will require further surgical debridement. Currently scheduled for this afternoon at 4:30.   ? Currently NPO  ? Anticoagulants held  ? Consent signed and in the chart. Dressings changed today: Dakins soaked 1/2\" plain packing, DSD secured with tape.   · PWB to heel touch, left lower extremity in CAM boot  · Discussed with [facility]. [facility]   Podiatric Medicine & Surgery   1/5/2021 at 8:34 AM

## 2021-01-06 LAB
ANION GAP SERPL CALCULATED.3IONS-SCNC: 11 MMOL/L (ref 9–17)
BUN BLDV-MCNC: 12 MG/DL (ref 8–23)
BUN/CREAT BLD: 15 (ref 9–20)
CALCIUM SERPL-MCNC: 8.2 MG/DL (ref 8.6–10.4)
CHLORIDE BLD-SCNC: 98 MMOL/L (ref 98–107)
CO2: 26 MMOL/L (ref 20–31)
CREAT SERPL-MCNC: 0.78 MG/DL (ref 0.7–1.2)
CULTURE: NORMAL
GFR AFRICAN AMERICAN: >60 ML/MIN
GFR NON-AFRICAN AMERICAN: >60 ML/MIN
GFR SERPL CREATININE-BSD FRML MDRD: ABNORMAL ML/MIN/{1.73_M2}
GFR SERPL CREATININE-BSD FRML MDRD: ABNORMAL ML/MIN/{1.73_M2}
GLUCOSE BLD-MCNC: 149 MG/DL (ref 75–110)
GLUCOSE BLD-MCNC: 274 MG/DL (ref 75–110)
GLUCOSE BLD-MCNC: 357 MG/DL (ref 70–99)
GLUCOSE BLD-MCNC: 382 MG/DL (ref 75–110)
GLUCOSE BLD-MCNC: 90 MG/DL (ref 75–110)
HCT VFR BLD CALC: 30.4 % (ref 40.7–50.3)
HEMOGLOBIN: 9 G/DL (ref 13–17)
Lab: NORMAL
MCH RBC QN AUTO: 25.9 PG (ref 25.2–33.5)
MCHC RBC AUTO-ENTMCNC: 29.6 G/DL (ref 28.4–34.8)
MCV RBC AUTO: 87.4 FL (ref 82.6–102.9)
NRBC AUTOMATED: 0 PER 100 WBC
PDW BLD-RTO: 15.2 % (ref 11.8–14.4)
PLATELET # BLD: 467 K/UL (ref 138–453)
PMV BLD AUTO: 9.3 FL (ref 8.1–13.5)
POTASSIUM SERPL-SCNC: 4.6 MMOL/L (ref 3.7–5.3)
RBC # BLD: 3.48 M/UL (ref 4.21–5.77)
SODIUM BLD-SCNC: 135 MMOL/L (ref 135–144)
SPECIMEN DESCRIPTION: NORMAL
WBC # BLD: 11.7 K/UL (ref 3.5–11.3)

## 2021-01-06 PROCEDURE — 36415 COLL VENOUS BLD VENIPUNCTURE: CPT

## 2021-01-06 PROCEDURE — 1200000000 HC SEMI PRIVATE

## 2021-01-06 PROCEDURE — 6370000000 HC RX 637 (ALT 250 FOR IP): Performed by: INTERNAL MEDICINE

## 2021-01-06 PROCEDURE — 2580000003 HC RX 258: Performed by: STUDENT IN AN ORGANIZED HEALTH CARE EDUCATION/TRAINING PROGRAM

## 2021-01-06 PROCEDURE — 85027 COMPLETE CBC AUTOMATED: CPT

## 2021-01-06 PROCEDURE — 6360000002 HC RX W HCPCS: Performed by: STUDENT IN AN ORGANIZED HEALTH CARE EDUCATION/TRAINING PROGRAM

## 2021-01-06 PROCEDURE — 80048 BASIC METABOLIC PNL TOTAL CA: CPT

## 2021-01-06 PROCEDURE — 94761 N-INVAS EAR/PLS OXIMETRY MLT: CPT

## 2021-01-06 PROCEDURE — 6370000000 HC RX 637 (ALT 250 FOR IP): Performed by: STUDENT IN AN ORGANIZED HEALTH CARE EDUCATION/TRAINING PROGRAM

## 2021-01-06 PROCEDURE — 99232 SBSQ HOSP IP/OBS MODERATE 35: CPT | Performed by: INTERNAL MEDICINE

## 2021-01-06 PROCEDURE — 94640 AIRWAY INHALATION TREATMENT: CPT

## 2021-01-06 PROCEDURE — 82947 ASSAY GLUCOSE BLOOD QUANT: CPT

## 2021-01-06 PROCEDURE — 99232 SBSQ HOSP IP/OBS MODERATE 35: CPT | Performed by: NURSE PRACTITIONER

## 2021-01-06 RX ORDER — INSULIN GLARGINE 100 [IU]/ML
35 INJECTION, SOLUTION SUBCUTANEOUS 2 TIMES DAILY
Status: DISCONTINUED | OUTPATIENT
Start: 2021-01-06 | End: 2021-01-10

## 2021-01-06 RX ORDER — BUDESONIDE AND FORMOTEROL FUMARATE DIHYDRATE 160; 4.5 UG/1; UG/1
2 AEROSOL RESPIRATORY (INHALATION) 2 TIMES DAILY
Status: DISCONTINUED | OUTPATIENT
Start: 2021-01-06 | End: 2021-01-11 | Stop reason: HOSPADM

## 2021-01-06 RX ADMIN — INSULIN LISPRO 1 UNITS: 100 INJECTION, SOLUTION INTRAVENOUS; SUBCUTANEOUS at 21:07

## 2021-01-06 RX ADMIN — BUDESONIDE AND FORMOTEROL FUMARATE DIHYDRATE 2 PUFF: 160; 4.5 AEROSOL RESPIRATORY (INHALATION) at 20:51

## 2021-01-06 RX ADMIN — OXYCODONE AND ACETAMINOPHEN 2 TABLET: 5; 325 TABLET ORAL at 03:58

## 2021-01-06 RX ADMIN — MORPHINE SULFATE 4 MG: 4 INJECTION, SOLUTION INTRAMUSCULAR; INTRAVENOUS at 23:44

## 2021-01-06 RX ADMIN — APIXABAN 5 MG: 5 TABLET, FILM COATED ORAL at 09:45

## 2021-01-06 RX ADMIN — SODIUM CHLORIDE, PRESERVATIVE FREE 10 ML: 5 INJECTION INTRAVENOUS at 21:34

## 2021-01-06 RX ADMIN — GUAIFENESIN 600 MG: 600 TABLET, EXTENDED RELEASE ORAL at 09:45

## 2021-01-06 RX ADMIN — MORPHINE SULFATE 4 MG: 4 INJECTION, SOLUTION INTRAMUSCULAR; INTRAVENOUS at 05:14

## 2021-01-06 RX ADMIN — MORPHINE SULFATE 4 MG: 4 INJECTION, SOLUTION INTRAMUSCULAR; INTRAVENOUS at 09:45

## 2021-01-06 RX ADMIN — POLYMYXIN B SULFATE, BACITRACIN ZINC, NEOMYCIN SULFATE: 5000; 3.5; 4 OINTMENT TOPICAL at 21:14

## 2021-01-06 RX ADMIN — MORPHINE SULFATE 4 MG: 4 INJECTION, SOLUTION INTRAMUSCULAR; INTRAVENOUS at 01:54

## 2021-01-06 RX ADMIN — METFORMIN HYDROCHLORIDE 500 MG: 500 TABLET ORAL at 09:45

## 2021-01-06 RX ADMIN — INSULIN LISPRO 9 UNITS: 100 INJECTION, SOLUTION INTRAVENOUS; SUBCUTANEOUS at 12:47

## 2021-01-06 RX ADMIN — FAMOTIDINE 20 MG: 20 TABLET, FILM COATED ORAL at 09:45

## 2021-01-06 RX ADMIN — OXYCODONE AND ACETAMINOPHEN 2 TABLET: 5; 325 TABLET ORAL at 21:10

## 2021-01-06 RX ADMIN — CEFTRIAXONE 2 G: 2 INJECTION, POWDER, FOR SOLUTION INTRAMUSCULAR; INTRAVENOUS at 09:45

## 2021-01-06 RX ADMIN — INSULIN LISPRO 15 UNITS: 100 INJECTION, SOLUTION INTRAVENOUS; SUBCUTANEOUS at 10:01

## 2021-01-06 RX ADMIN — APIXABAN 5 MG: 5 TABLET, FILM COATED ORAL at 21:08

## 2021-01-06 RX ADMIN — DAKIN'S SOLUTION 0.125% (QUARTER STRENGTH): 0.12 SOLUTION at 09:47

## 2021-01-06 RX ADMIN — POLYMYXIN B SULFATE, BACITRACIN ZINC, NEOMYCIN SULFATE: 5000; 3.5; 4 OINTMENT TOPICAL at 09:47

## 2021-01-06 RX ADMIN — ALUMINUM HYDROXIDE, MAGNESIUM HYDROXIDE, AND SIMETHICONE 30 ML: 200; 200; 20 SUSPENSION ORAL at 02:46

## 2021-01-06 RX ADMIN — SODIUM CHLORIDE: 9 INJECTION, SOLUTION INTRAVENOUS at 21:26

## 2021-01-06 RX ADMIN — MORPHINE SULFATE 4 MG: 4 INJECTION, SOLUTION INTRAMUSCULAR; INTRAVENOUS at 14:58

## 2021-01-06 RX ADMIN — INSULIN GLARGINE 35 UNITS: 100 INJECTION, SOLUTION SUBCUTANEOUS at 21:08

## 2021-01-06 RX ADMIN — MORPHINE SULFATE 4 MG: 4 INJECTION, SOLUTION INTRAMUSCULAR; INTRAVENOUS at 19:43

## 2021-01-06 RX ADMIN — GUAIFENESIN 600 MG: 600 TABLET, EXTENDED RELEASE ORAL at 21:08

## 2021-01-06 RX ADMIN — INSULIN GLARGINE 35 UNITS: 100 INJECTION, SOLUTION SUBCUTANEOUS at 10:01

## 2021-01-06 RX ADMIN — FAMOTIDINE 20 MG: 20 TABLET, FILM COATED ORAL at 21:08

## 2021-01-06 RX ADMIN — NORTRIPTYLINE HYDROCHLORIDE 50 MG: 25 CAPSULE ORAL at 21:08

## 2021-01-06 ASSESSMENT — PAIN DESCRIPTION - FREQUENCY
FREQUENCY: CONTINUOUS

## 2021-01-06 ASSESSMENT — PAIN DESCRIPTION - ONSET
ONSET: ON-GOING

## 2021-01-06 ASSESSMENT — PAIN DESCRIPTION - DESCRIPTORS
DESCRIPTORS: CONSTANT
DESCRIPTORS: CONSTANT
DESCRIPTORS: SHARP
DESCRIPTORS: CONSTANT;DISCOMFORT
DESCRIPTORS: CONSTANT;DISCOMFORT
DESCRIPTORS: SHARP

## 2021-01-06 ASSESSMENT — PAIN DESCRIPTION - PAIN TYPE
TYPE: SURGICAL PAIN

## 2021-01-06 ASSESSMENT — PAIN DESCRIPTION - PROGRESSION
CLINICAL_PROGRESSION: NOT CHANGED

## 2021-01-06 ASSESSMENT — PAIN DESCRIPTION - ORIENTATION
ORIENTATION: LEFT

## 2021-01-06 ASSESSMENT — PAIN - FUNCTIONAL ASSESSMENT
PAIN_FUNCTIONAL_ASSESSMENT: ACTIVITIES ARE NOT PREVENTED

## 2021-01-06 ASSESSMENT — PAIN DESCRIPTION - LOCATION
LOCATION: FOOT

## 2021-01-06 ASSESSMENT — PAIN SCALES - GENERAL
PAINLEVEL_OUTOF10: 8
PAINLEVEL_OUTOF10: 4
PAINLEVEL_OUTOF10: 4
PAINLEVEL_OUTOF10: 8
PAINLEVEL_OUTOF10: 8
PAINLEVEL_OUTOF10: 7
PAINLEVEL_OUTOF10: 8
PAINLEVEL_OUTOF10: 7

## 2021-01-06 ASSESSMENT — ENCOUNTER SYMPTOMS
RESPIRATORY NEGATIVE: 1
GASTROINTESTINAL NEGATIVE: 1

## 2021-01-06 NOTE — PROGRESS NOTES
[facility]  Progress Note    Renetta Olsen  MRN: [id number]  Date: 1/6/2021  LOS: 6     Reason for F/U :   Left transmetatarsal amputation stump infection    Impression :   1. Left transmetatarsal amputation stump site infection with underlying osteomyelitis of the first and fifth metatarsals, abscess  · Status post incision and drainage of the left foot with removal of nonviable bone and soft tissue  · Wound culture with group B streptococcus and E. coli  2. Leukocytosis secondary to above, improving  3. 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  4. Peripheral arterial disease  5. Noncompliance    Recommendations:   · Patient continues on ceftriaxone  · Patient went to the operating room 1/5/2021 was found to have multiple abscesses tracking along the tendons and the plantar aspect of the calcaneus. Patient underwent debridement with removal of nonviable soft tissue and bone. Podiatry has spoken with the patient on multiple occasion telling them that they believe his foot is nonsalvageable and he should undergo a BKA. He has expressed that he would like to wait 24 hours to make his final decision. · Await final decision regarding potential for left-sided BKA. Infection Control Recommendations:   Contact precautions    Discharge Planning:   Estimated Length of IV antimicrobials: To be determined  Patient will need Midline Catheter Insertion/ PICC line Insertion: No  Patient will need: Home IV , [place],  SNF,  LTAC: Undetermined  Patient willneed outpatient wound care: No    Medical Decision making / Summary of Stay:   Renetta Olsen is a 63y.o.-year-old male who was initially admitted on 12/31/2020. Patient      Patient is known to our practice for multiple hospitalizations since July 2020. He was initially admitted July 28, 2020 for left hallux gangrene/left foot cellulitis and plantar ulcerations.   He underwent left superficial femoral, popliteal, tibial, peroneal trunk, and posterior tibial artery atherectomy/balloon angioplasty 7/29/2020. Patient underwent left TMA with Achilles tendon lengthening 8/5/2020. Patient was discharged 8/7/2020 to rehab on doxycycline and Augmentin x1 week.     8/23/2020 patient returned to [facility] saying he never received wound care. He was found to have wound dehiscence with concern for underlying osteomyelitis. 8/24/2020 he underwent incision and drainage of the left foot with revision of left foot TMA. At that time, cultures grew VRE, Proteus mirabilis, and coagulase-negative Staphylococcus. He was discharged on oral ciprofloxacin and linezolid through 9/25/2020 to complete a 4-week course.     11/3/2020 patient returned to the emergency department due to drainage from his foot. Patient was found to have TMA stump infection with underlying osteomyelitis of the metatarsals, first and fifth metatarsals did probe to the bone. At that time, we did discuss with podiatry as well as the patient that he would likely need to undergo left BKA; however, they both opted to treat patient with 6 weeks of IV antibiotics. He was discharged on 11/12/2020 on IV cefepime and linezolid to an extended care facility with plans to continue through 12/15/2020.  11/19/2020 patient left skilled nursing facility AMA. IV cefepime was stopped at that time. Patient continued on oral Zyvox. 11/23/2020 patient saw Dr. Lilliana Rodriguez in the office and patient was instructed to complete the oral linezolid. Patient did follow-up again in the office 12/14/2020 and wound was smaller without any overt signs of infection at that time.     12/26/2020 patient presented to [facility] due to falling and having increasing pain and redness in the left foot. WBC 16,900, CRP 67.5. The wound probed to the bone at that time. It was recommended for patient to be admitted, undergo debridement, and receive IV antibiotics. Patient refused.   He was prescribed doxycycline 100 mg p.o. twice a day x10 days and patient signed out  [address]     Patient returned to the emergency department today due to pain in the left foot. He does tell me that he attempted to go to his podiatrist at the beginning of the week but could not get in. Upon arrival to the emergency department, it was noted that he had the same dressing on his foot that was placed in the emergency department last week. He did rate pain 7 out of 10, throbbing and constant. He has not had any documented fevers but does have hot and cold flashes. Patient does not really have any other complaints aside from wanting pain medication.     Patient has been started on vancomycin and Zosyn. We have been consulted to assist with antimicrobial therapy management. 2021 incision and drainage of the left foot with removal of nonviable bone and soft tissue  2020 left foot debridement with removal of nonviable soft tissue and bone    Current evaluation:2021    /71   Pulse 100   Temp 98.2 °F (36.8 °C) (Oral)   Resp 16   Ht 6' 1\" (1.854 m)   Wt 149 lb 11.2 oz (67.9 kg)   SpO2 98%   BMI 19.75 kg/m²     Temperature Range: Temp: 98.2 °F (36.8 °C) Temp  Av.9 °F (36.6 °C)  Min: 97 °F (36.1 °C)  Max: 98.8 °F (37.1 °C)    The patient was seen and evaluated sitting up in bed  No fevers or chills  Dressing in place  He became very upset and angry regarding discussion of potential BKA  I did attempt to end the conversation on multiple separate occasions and encouraged patient to speak with his surgeon regarding his decision, patient became very upset stating that I caused him to be frustrated. Review of Systems   Constitutional: Negative. HENT: Negative. Respiratory: Negative. Cardiovascular: Negative. Gastrointestinal: Negative. Genitourinary: Negative. Musculoskeletal:        Pain in lle   Skin: Negative. Neurological: Negative.     Hematological: Negative. Psychiatric/Behavioral: Negative. Physical Examination :     Physical Exam  Constitutional:       General: He is not in acute distress. Appearance: Normal appearance. He is normal weight. HENT:      Head: Normocephalic and atraumatic. Pulmonary:      Effort: Pulmonary effort is normal. No respiratory distress. Abdominal:      General: Abdomen is flat. There is no distension. Musculoskeletal:      Comments: Right lower extremity BKA. Left lower extremity with multiple abrasions/scab/eschar with dressing on stump, surrounding erythema has improved   Skin:     General: Skin is warm and dry. Neurological:      General: No focal deficit present. Mental Status: He is alert and oriented to person, place, and time. Mental status is at baseline. Psychiatric:         Mood and Affect: Mood normal.         Behavior: Behavior normal.         Laboratory data:   I have independently reviewed the followinglabs:  CBC with Differential:   Recent Labs     01/05/21  0627 01/05/21  1710 01/06/21  0548   WBC 12.2*  --  11.7*   HGB 9.1* 9.6* 9.0*   HCT 30.3* 32.6* 30.4*     --  467*     BMP:   Recent Labs     01/05/21  0627 01/06/21  0548    135   K 3.3* 4.6    98   CO2 27 26   BUN 11 12   CREATININE 0.68* 0.78   MG 1.7  --      Hepatic Function Panel: No results for input(s): PROT, LABALBU, BILIDIR, IBILI, BILITOT, ALKPHOS, ALT, AST in the last 72 hours.       No results found for: PROCAL  Lab Results   Component Value Date    .8 01/02/2021    CRP 60.2 12/31/2020    CRP 67.5 12/26/2020     Lab Results   Component Value Date    SEDRATE 70 (H) 12/31/2020         Lab Results   Component Value Date    DDIMER 0.39 06/29/2020    DDIMER 1.63 12/20/2017    DDIMER 0.68 12/25/2011     Lab Results   Component Value Date    FERRITIN 64 01/25/2014     No results found for: LDH  No results found for: FIBRINOGEN    Results in Past 30 Days  Result Component Current Result Ref Range Previous Result Ref Range   SARS-CoV-2      (12/31/2020)  Not in Time Range          (12/31/2020)       Not Detected (12/31/2020) Not Detected       Lab Results   Component Value Date    COVID19 Not Detected 12/31/2020    COVID19 Not Detected 11/10/2020    COVID19 Not Detected 08/23/2020    COVID19 Not Detected 07/29/2020       No results for input(s): VANCOTROUGH in the last 72 hours. Imaging Studies:     1/5/2021 MRI  Impression:        Acute osteomyelitis of the remaining 5th metatarsal.     Patchy bone marrow edema is seen in the remaining 1st-4th metatarsals as well   as the cuboid without definite marrow replacement.  This could represent   reactive bone marrow edema versus early acute osteomyelitis. Nonenhancing 1.5 x 0.6 x 2.2 cm T2 hyperintense lesion at the plantar aspect   of the 4th metatarsal suspicious for an abscess. Nonspecific generalized plantar muscle edema. Cultures:     Culture, Blood 1 [[id number]] Collected: 12/31/20 1502   Order Status: Completed Specimen: Blood Updated: 01/06/21 0012    Specimen Description . BLOOD    Special Requests 8ML LAC    Culture NO GROWTH 6 DAYS   Culture, Blood 1 [[id number]] Collected: 12/31/20 0745   Order Status: Completed Specimen: Blood Updated: 01/06/21 0012    Specimen Description . BLOOD    Special Requests 11ML LINE DRAW    Culture NO GROWTH 6 DAYS   Culture, Blood 1 [[id number]] Collected: 12/31/20 0753   Order Status: Completed Specimen: Blood Updated: 01/06/21 0012    Specimen Description . BLOOD    Special Requests 8ML LEFT AC    Culture NO GROWTH 6 DAYS   Culture, Anaerobic and Aerobic [[id number]] (Abnormal)  Collected: 12/31/20 1907   Order Status: Completed Specimen: Foot Updated: 01/05/21 1545    Specimen Description . FOOT LEFT    Special Requests NOT REPORTED    Direct Exam NO NEUTROPHILS SEEN     RARE GRAM POSITIVE COCCI IN PAIRSAbnormal     Culture STREPTOCOCCI, BETA HEMOLYTIC GROUP B LIGHT GROWTHAbnormal      ESCHERICHIA COLI

## 2021-01-06 NOTE — PLAN OF CARE
Problem: Serum Glucose Level - Abnormal:  Goal: Ability to maintain appropriate glucose levels will improve  Description: Ability to maintain appropriate glucose levels will improve  Outcome: Ongoing     Problem: Sensory Perception - Impaired:  Goal: Ability to maintain a stable neurologic state will improve  Description: Ability to maintain a stable neurologic state will improve  Outcome: Ongoing     Problem: SAFETY  Goal: Free from accidental physical injury  Outcome: Ongoing  Goal: Free from intentional harm  Outcome: Ongoing     Problem: PAIN  Goal: Patient's pain/discomfort is manageable  Outcome: Ongoing     Problem: SKIN INTEGRITY  Goal: Skin integrity is maintained or improved  Outcome: Ongoing

## 2021-01-06 NOTE — PROGRESS NOTES
VASCULAR SURGERY  PROGRESS NOTE      1/5/2021 8:04 PM  Subjective:   Admit Date: 12/31/2020  PCP: Milton Post DO    Chief Complaint   Patient presents with    Foot Pain     L side     Interval History: No complaints. Stable status post debridement. Per podiatry and they do not feel the left foot is salvageable however patient would like to discuss it with them further. Diet: DIET CARB CONTROL; Carb Control: 3 carb choices (45 gms)/meal    Medications:   Scheduled Meds:   guaiFENesin  600 mg Oral BID    cefTRIAXone (ROCEPHIN) IV  2 g Intravenous Q24H    insulin glargine  60 Units Subcutaneous Nightly    sodium hypochlorite   Irrigation Daily    neomycin-bacitracin-polymyxin   Topical BID    apixaban  5 mg Oral BID    metFORMIN  500 mg Oral BID WC    nortriptyline  50 mg Oral Nightly    sodium chloride flush  10 mL Intravenous 2 times per day    insulin lispro  0-18 Units Subcutaneous TID WC    insulin lispro  0-9 Units Subcutaneous Nightly    famotidine  20 mg Oral BID     Continuous Infusions:   sodium chloride 75 mL/hr at 01/05/21 1753    dextrose Stopped (01/05/21 1753)         Labs:   CBC:   Recent Labs     01/03/21  0658 01/04/21  0723 01/05/21  0627 01/05/21  1710   WBC 12.6* 14.1* 12.2*  --    HGB 10.3* 10.0* 9.1* 9.6*    436 415  --      BMP:    Recent Labs     01/03/21  0658 01/04/21  0723 01/05/21  0627   * 136 138   K 4.2 3.8 3.3*    103 104   CO2 23 23 27   BUN 14 12 11   CREATININE 0.83 0.64* 0.68*   GLUCOSE 162* 93 71     Hepatic: No results for input(s): AST, ALT, ALB, BILITOT, ALKPHOS in the last 72 hours. Troponin: Invalid input(s): TROPONIN  BNP: No results for input(s): BNP in the last 72 hours. Lipids: No results for input(s): CHOL, HDL in the last 72 hours. Invalid input(s): LDLCALCU  INR: No results for input(s): INR in the last 72 hours.     Objective:   Vitals: BP (!) 194/84   Pulse 101   Temp 97.3 °F (36.3 °C) (Temporal)   Resp 19   Ht 6' 1\" (1.854 m)   Wt 146 lb 1.6 oz (66.3 kg)   SpO2 97%   BMI 19.28 kg/m²   General appearance: alert, cooperative and no distress  Mental Status: oriented to person, place and time with normal affect  Neck: good carotid pulses, no JVD  Lungs: clear to auscultation bilaterally, normal effort  Heart: regular rate and rhythm, no murmur,  Abdomen: soft, non-tender, non-distended, bowel sounds present all four quadrants, no masses, hepatomegaly, splenomegaly or aortic enlargement  Extremities: peripheral pulses by Doppler, right BKA site healed, left TMA with open ulcer, no erythema, positive bleeding at skin edges      Assessment:   3 72-year-old male with left TMA ulcer/foot infection  2. PAD  3. Diabetes mellitus  4.  Noncompliance    Patient Active Problem List:     Type 2 diabetes mellitus with diabetic polyneuropathy, with long-term current use of insulin (HCC)     Neuropathic pain, leg     Diabetic polyneuropathy (HCC)     Allergic rhinitis     Tobacco dependence     Edentulous     Dysphagia     Lung nodules     Esophageal cancer (HCC)     Fracture of humerus, left, closed     Closed fracture of humerus     DM type 2 with diabetic peripheral neuropathy (HCC)     Chronic obstructive pulmonary disease (HCC)     HLD (hyperlipidemia)     Gastroesophageal reflux disease     Chronic pain syndrome     Marijuana use     History of colon polyps     Cellulitis of right heel     Functional diarrhea     Sepsis due to methicillin resistant Staphylococcus aureus (MRSA) ([facility])     History of esophageal cancer     Osteomyelitis, chronic, ankle or foot ([address]     Peripheral artery disease ([address]     Other pulmonary embolism without acute cor pulmonale (HCC)     Carotid stenosis, asymptomatic, bilateral     Lower limb amputation status     Fx humeral neck, right, closed, initial encounter     Transient hypotension     Constipation due to opioid therapy     Acute kidney injury ([address]     Diabetic ulcer of left midfoot associated with type 2 diabetes mellitus, with fat layer exposed ([address]     Complication of below knee amputation stump (HCC)     COPD exacerbation (HCC)     Hyponatremia     Pain, phantom limb ([address]     Below-knee amputation of right lower extremity (HCC)     Moderate protein malnutrition ([address]     Essential hypertension     Uncontrolled type 2 diabetes mellitus with hyperglycemia ([address]     History of pulmonary embolism - 2017     Atelectasis     Uncontrolled type 2 diabetes mellitus with foot ulcer (HCC)     Azotemia     Anemia, normocytic normochromic     Osteomyelitis of fourth phalange of left foot (HCC)     Drug-induced constipation     Osteomyelitis (HCC)     Diabetic foot infection ([address]     Noncompliance     Type 1 diabetes mellitus with diabetic foot infection ([address]     Acute osteomyelitis of left foot (HCC)     Cellulitis of left foot     Mild malnutrition (HCC)     Diabetic infection of left foot (HCC)     Hypocalcemia     Hypokalemia      Plan:   1. Continue ABX and dressing changes  2. Smoking cessation  3. Patient to discuss left foot further with podiatry. If felt not to be salvageable then he will need to undergo a left below-knee amputation.     Electronically signed by Ermelinda Vazquez MD on 1/5/2021 at 8:04 PM

## 2021-01-06 NOTE — PROGRESS NOTES
I spoke with the patient over the phone regarding his condition. I explained to him that his foot is severely infected. There are multiple abscesses tracking along different compartments of the foot. There is new cellulitis along the lateral aspect of the ankle despite surgical intervention, IV antibiotics and daily wound care. I recommended a BKA. Patient was upset and wants to wait 24 hours before making a final decision. I will see him early tomorrow morning to re-discuss.     Electronically signed by Aye Wei DPM on 1/6/2021 at 9:26 AM

## 2021-01-06 NOTE — DISCHARGE INSTR - COC
Continuity of Care Form    Patient Name: Ousmane Fairchild   :  1955  MRN:  [id number]    Admit date:  2020  Discharge date:  2021    Code Status Order: Full Code   Advance Directives:   Advance Care Flowsheet Documentation       Date/Time Healthcare Directive Type of Healthcare Directive Copy in [address] Agent's Name Healthcare Agent's Phone Number    21 0200  No, patient does not have an advance directive for healthcare treatment -- -- -- -- --            Admitting Physician:  Meribeth Rinne, DO  PCP: Long Guerrero DO    Discharging Nurse: [facility] Unit/Room#:   Discharging Unit Phone Number: [phone]    Emergency Contact:   Extended Emergency Contact Information  Primary Emergency Contact: Farhan Pritchard  Address: [place]   Home Phone: [phone]  Relation: Parent  Secondary Emergency Contact: [facility] [address]  Phone: [phone]  Mobile Phone: [phone]  Relation: Child    Past Surgical History:  Past Surgical History:   Procedure Laterality Date    COLONOSCOPY  2015    hyperplastic polyp    COLONOSCOPY  2017    ESOPHAGECTOMY      cancer    FOOT DEBRIDEMENT Left 2021    I&D LEFT FOOT WITH REMOVAL OF NONVIABLE BONE AND SOFT TISSUE performed by Sarah Madrigal DPM at [address] [place] [address] Right 2016    I & D heel    FOOT SURGERY Right 2016    I & D    FRACTURE SURGERY Left 2015    humerus left, left leg    IR INS PICC VAD W SQ PORT GREATER THAN 5  2020    IR INS PICC VAD W SQ PORT GREATER THAN 5 2020 [facility]    LEG AMPUTATION BELOW KNEE Right 2017    TOE AMPUTATION Right     rt 3rd through 5th digits    TOE AMPUTATION Left 2016    left foot 5th toe    TOE AMPUTATION Left 2020    FOOT TRANSMETATARSAL  AMPUTATION - AND LEFT PECUTANEOUS TENDO ACHILLES Lower limb amputation status Z89.619    Fx humeral neck, right, closed, initial encounter S42.211A    Transient hypotension I95.9    Constipation due to opioid therapy K59.03, T40.2X5A    Acute kidney injury ([facility] N17.9    Diabetic ulcer of left midfoot associated with type 2 diabetes mellitus, with fat layer exposed ([facility] E11.621, X28.978    Complication of below knee amputation stump ([facility]) T87.9    COPD exacerbation ([facility]) J44.1    Hyponatremia E87.1    Pain, phantom limb ([facility]) G54.6    Below-knee amputation of right lower extremity ([facility]) S09.238H    Moderate protein malnutrition ([facility]) E44.0    Essential hypertension I10    Uncontrolled type 2 diabetes mellitus with hyperglycemia ([facility]) E11.65    History of pulmonary embolism - 2017 Z86. 80    Atelectasis J98.11    Uncontrolled type 2 diabetes mellitus with foot ulcer ([facility]) E11.621, L97.509, E11.65    Azotemia R79.89    Anemia, normocytic normochromic D64.9    Osteomyelitis of fourth phalange of left foot ([facility]) M86.9    Drug-induced constipation K59.03    Osteomyelitis of metatarsals of left foot ([facility]) M86.9    Diabetic foot infection ([facility]) E11.628, L08.9    Noncompliance Z91.19    Type 1 diabetes mellitus with diabetic foot infection ([facility] E10.628, L08.9    Acute osteomyelitis of left foot ([facility]) M86.172    Cellulitis of left foot L03. 116    Mild malnutrition ([facility]) E44.1    Diabetic infection of left foot ([facility]) E11.628, L08.9    Hypocalcemia E83.51    Hypokalemia E87.6       Isolation/Infection:   Isolation            Contact          Patient Infection Status       Infection Onset Added Last Indicated Last Indicated By Review Planned Expiration Resolved Resolved By    VRE 08/24/20 08/27/20 08/24/20 Culture, Anaerobic and Aerobic        Foot - 12/2020    Resolved    COVID-19 Rule Out 08/22/20 08/22/20 08/23/20 COVID-19 (Ordered)   08/23/20 Rule-Out Test Resulted    COVID-19 Rule Out 08/01/20 08/01/20 08/01/20 COVID-19 (Ordered) 08/02/20 Annamarie Pritchard RN    Test discontinued - negative result this admission    MRSA  01/02/17 01/02/17 Annamarie Pritchard RN   07/31/20 Annamarie Pritchard RN    2 negative nasal screen - 2020  Foot - 6/2018            Nurse Assessment:  Last Vital Signs: /74   Pulse 104   Temp 98.8 °F (37.1 °C) (Oral)   Resp 20   Ht 6' 1\" (1.854 m)   Wt 149 lb 11.2 oz (67.9 kg)   SpO2 96%   BMI 19.75 kg/m²     Last documented pain score (0-10 scale): Pain Level: 8  Last Weight:   Wt Readings from Last 1 Encounters:   01/06/21 149 lb 11.2 oz (67.9 kg)     Mental Status:  oriented and alert    IV Access:  - None    Nursing Mobility/ADLs:  Walking   Assisted  Transfer  Assisted  Bathing  Assisted  Dressing  Assisted  Toileting  Assisted  Feeding  Independent  Med Admin  Assisted  Med Delivery   whole    Wound Care Documentation and Therapy:  Wound 07/28/20 Other (Comment) Left; Other (Comment) (Active)   Number of days: 162        Elimination:  Continence:   · Bowel: Yes  · Bladder: Yes  Urinary Catheter: None   Colostomy/Ileostomy/Ileal Conduit: No       Date of Last BM: 1/9/2021    Intake/Output Summary (Last 24 hours) at 1/6/2021 0648  Last data filed at 1/6/2021 0514  Gross per 24 hour   Intake 2133 ml   Output 1660 ml   Net 473 ml     I/O last 3 completed shifts: In: 4036 [I.V.:1363]  Out: 910 [Urine:880; Blood:30]    Safety Concerns: At Risk for Falls    Impairments/Disabilities:      Amputation - Rt leg    Nutrition Therapy:  Current Nutrition Therapy: CARB      Routes of Feeding: Oral  Liquids: No Restrictions  Daily Fluid Restriction: no  Last Modified Barium Swallow with Video (Video Swallowing Test): not done    Treatments at the Time of Hospital Discharge:   Respiratory Treatments: yes  Oxygen Therapy:  is not on home oxygen therapy.   Ventilator:    - No ventilator support    Rehab Therapies: Physical Therapy and Occupational Therapy  Weight Bearing Status/Restrictions: No weight bearing restirctions  Other Medical Equipment (for information only, NOT a DME order):  walker  Other Treatments: Skilled nursing assessment  Med teaching and compliance  Dressing changes daily - Bacitracin antibiotic ointment to patient's dry eschars located on the left leg. Dakin's soaked 1/4\" plain packing to left foot plantar and lateral wounds, 4x4 gauze, ABD, kerlix and secure with tape. Follow up Dr. Darwin Romero for a second opinion on Friday 1/15/21 at [phone]. Patient's personal belongings (please select all that are sent with patient):  None    RN SIGNATURE:  Electronically signed by Quentin Elizabeth RN on 1/11/21 at 4:49 PM EST    CASE MANAGEMENT/SOCIAL WORK SECTION    Inpatient Status Date: ***    Readmission Risk Assessment Score:  Readmission Risk              Risk of Unplanned Readmission:        66           Discharging to Facility/ Agency   · Name: [facility]  · Address:  · Phone: [phone]  · Fax: [phone]      / signature: Electronically signed by Andrew Zaldivar RN on 1/7/21 at 4:04 PM EST    PHYSICIAN SECTION    Prognosis: Fair    Condition at Discharge: Stable    Rehab Potential (if transferring to Rehab): Fair    Recommended Labs or Other Treatments After Discharge:   Kimberly Huber to perform daily dressing changes consisting of Dakin's soaked packing plain to the wounds, with 4x4 gauze, ABD, kerlix and secured with tape. Patient to follow-up with wound care center of his choice. Monitor blood sugars, P[facility] at [facility] to adjust insulins based on that    Physician Certification: I certify the above information and transfer of Cherise Gracia  is necessary for the continuing treatment of the diagnosis listed and that he requires [facility] for greater 30 days.      Update Admission H&P: No change in H&P    PHYSICIAN SIGNATURE:  Electronically signed by Cami Jay MD on 1/11/21 at 3:59 PM EST

## 2021-01-06 NOTE — OP NOTE
Operative Note      Patient: Leia Carney  YOB: 1955  MRN: [id number]    Date of Procedure: 1/5/2021    Pre-Op Diagnosis: DX OSTEOMYELITIS AND ABSCESS LEFT FOOT    Post-Op Diagnosis: Same       Procedure(s):  LEFT FOOT DEBRIDEMENT WITH REMOVAL ALL NON VIABLE SOFT TISSUE AND BONE    Surgeon(s):  Ev Davis DPM    Assistant:   First Assistant: Hyacinth Villarreal RN  Resident: Mallika Lee DPM    Anesthesia: Monitor Anesthesia Care    Estimated Blood Loss (mL): less than 50     Complications: Bleeding, Other: multiple abscesses tracking along different compartments    Specimens:   * No specimens in log *    Implants:  * No implants in log *      Drains: * No LDAs found *    Findings: Multiple abscesses tracking along tendons and along the plantar aspect of the calcaneus    Detailed Description of Procedure:   Patient was brought into the operating room and placed on the table in supine position with a safety strap across the lap. Following IV sedation local anesthetic was infiltrated to the left ankle utilizing 15 cc of a one-to-one mix of 1% lidocaine plain and 0.5% Marcaine plain. A well-padded ankle tourniquet was applied. The foot was scrubbed prepped and draped in the usual aseptic technique. Timeout was performed to confirm the correct patient, procedure and extremity. Everyone was agreeable.     Attention was directed to the left foot. Foot was not exsanguinated and tourniquet was not inflated. A large wound the lateral aspect of the foot was noted with exposed bone. New erythema along the lateral aspect of the ankle was noted. The remaining aspect of the 5th metatarsal base was sharply excised and passed to the back table. The bone was soft and discolored. The remaining cuboid appeared to be white and shiny. No purulent drainage was expressed from along the peroneal tendons. Next, attention was directed to the plantar aspect of the foot.   Multiple abscesses  were noted to be tracking along the flexor tendons. Sharp excisional debridement of necrotic and fibrotic was performed with a rounger. Next, attention was directed to the plantar aspect of the foot. A fluctuant area was noted beneath the 3rd and 4th metatarsals. An incision was made along the plantar arch. Purulent drainage was expressed. Area was noted to track along the plantar fascia and deep to the 3rd and 4th metatarsals connecting to the open wound. Incision as deepened down to bone. Vessel was cut and ligated with 0 vicryl hand tie. Gel foam and throbin were used. Hemostasis was achieved. Surgical site was irrigated with copious amounts of sterile saline using a pulse lavage. Upon further inspection another abscess pocket was noted to be extending from the plantar lateral aspect of the calcaneus tracking all the way medially. Area was opened up and drained. Area was irrigated with copious amounts of sterile saline. At this time the foot was noted to have multiple abscess tracking in multiple different compartments of the foot despite recent surgical intervention, IV antibiotics and daily wound care. It was decided that the foot is not salvageable. Simple interrupting sutures were placed to the plantar incision with 3-0 nylon. Surgical site was packed with 1/2\" iodoform and covered with fluffs, ABD, Kerlix and tape.  Patient was transferred to the recovery room with vital signs stable and intact perfusion to the left foot.       Electronically signed by Jeremiah Purdy DPM on 1/6/2021 at 9:01 AM

## 2021-01-06 NOTE — PROGRESS NOTES
VASCULAR SURGERY  PROGRESS NOTE      1/6/2021 4:28 PM  Subjective:   Admit Date: 12/31/2020  PCP: Sophia Nyhan, DO    Chief Complaint   Patient presents with    Foot Pain     L side     Interval History: No complaints. Patient would like to wait 24 hours before making a decision regarding a below-knee amputation. Diet: DIET CARB CONTROL; Carb Control: 3 carb choices (45 gms)/meal    Medications:   Scheduled Meds:   insulin glargine  35 Units Subcutaneous BID    budesonide-formoterol  2 puff Inhalation BID    guaiFENesin  600 mg Oral BID    cefTRIAXone (ROCEPHIN) IV  2 g Intravenous Q24H    sodium hypochlorite   Irrigation Daily    neomycin-bacitracin-polymyxin   Topical BID    apixaban  5 mg Oral BID    metFORMIN  500 mg Oral BID WC    nortriptyline  50 mg Oral Nightly    sodium chloride flush  10 mL Intravenous 2 times per day    insulin lispro  0-18 Units Subcutaneous TID WC    insulin lispro  0-9 Units Subcutaneous Nightly    famotidine  20 mg Oral BID     Continuous Infusions:   sodium chloride 75 mL/hr at 01/05/21 1753    dextrose Stopped (01/05/21 1753)         Labs:   CBC:   Recent Labs     01/04/21  0723 01/05/21  0627 01/05/21  1710 01/06/21  0548   WBC 14.1* 12.2*  --  11.7*   HGB 10.0* 9.1* 9.6* 9.0*    415  --  467*     BMP:    Recent Labs     01/04/21  0723 01/05/21  0627 01/06/21  0548    138 135   K 3.8 3.3* 4.6    104 98   CO2 23 27 26   BUN 12 11 12   CREATININE 0.64* 0.68* 0.78   GLUCOSE 93 71 357*     Hepatic: No results for input(s): AST, ALT, ALB, BILITOT, ALKPHOS in the last 72 hours. Troponin: Invalid input(s): TROPONIN  BNP: No results for input(s): BNP in the last 72 hours. Lipids: No results for input(s): CHOL, HDL in the last 72 hours. Invalid input(s): LDLCALCU  INR: No results for input(s): INR in the last 72 hours.     Objective:   Vitals: /71   Pulse 100   Temp 98.2 °F (36.8 °C) (Oral)   Resp 16   Ht 6' 1\" (1.854 m)   Wt 149 lb 11.2 oz (67.9 kg)   SpO2 98%   BMI 19.75 kg/m²   General appearance: alert, cooperative and no distress  Mental Status: oriented to person, place and time with normal affect  Neck: good carotid pulses, no JVD  Lungs: clear to auscultation bilaterally, normal effort  Heart: regular rate and rhythm, no murmur,  Abdomen: soft, non-tender, non-distended, bowel sounds present all four quadrants, no masses, hepatomegaly, splenomegaly or aortic enlargement  Extremities: peripheral pulses by Doppler, right BKA site healed, left TMA with open ulcer, no erythema, positive bleeding at skin edges      Assessment:   3 62-year-old male with left TMA ulcer/foot infection  2. PAD  3. Diabetes mellitus  4.  Noncompliance    Patient Active Problem List:     Type 2 diabetes mellitus with diabetic polyneuropathy, with long-term current use of insulin (HCC)     Neuropathic pain, leg     Diabetic polyneuropathy (HCC)     Allergic rhinitis     Tobacco dependence     Edentulous     Dysphagia     Lung nodules     Esophageal cancer (HCC)     Fracture of humerus, left, closed     Closed fracture of humerus     DM type 2 with diabetic peripheral neuropathy (HCC)     Chronic obstructive pulmonary disease (HCC)     HLD (hyperlipidemia)     Gastroesophageal reflux disease     Chronic pain syndrome     Marijuana use     History of colon polyps     Cellulitis of right heel     Functional diarrhea     Sepsis due to methicillin resistant Staphylococcus aureus (MRSA) (HCC)     History of esophageal cancer     Osteomyelitis, chronic, ankle or foot ([address]     Peripheral artery disease ([address]     Other pulmonary embolism without acute cor pulmonale (HCC)     Carotid stenosis, asymptomatic, bilateral     Lower limb amputation status     Fx humeral neck, right, closed, initial encounter     Transient hypotension     Constipation due to opioid therapy     Acute kidney injury ([address]     Diabetic ulcer of left midfoot associated with type 2 diabetes mellitus, with fat layer exposed ([address]     Complication of below knee amputation stump (HCC)     COPD exacerbation (HCC)     Hyponatremia     Pain, phantom limb ([address]     Below-knee amputation of right lower extremity (HCC)     Moderate protein malnutrition ([address]     Essential hypertension     Uncontrolled type 2 diabetes mellitus with hyperglycemia ([address]     History of pulmonary embolism - 2017     Atelectasis     Uncontrolled type 2 diabetes mellitus with foot ulcer (HCC)     Azotemia     Anemia, normocytic normochromic     Osteomyelitis of fourth phalange of left foot (HCC)     Drug-induced constipation     Osteomyelitis (HCC)     Diabetic foot infection ([address]     Noncompliance     Type 1 diabetes mellitus with diabetic foot infection ([address]     Acute osteomyelitis of left foot (HCC)     Cellulitis of left foot     Mild malnutrition (HCC)     Diabetic infection of left foot (HCC)     Hypocalcemia     Hypokalemia      Plan:   1. Continue ABX and dressing changes  2. Smoking cessation  3. Await patient decision regarding below-knee amputation versus continued management.     Electronically signed by Diego Salmon MD on 1/6/2021 at 4:28 PM

## 2021-01-06 NOTE — PROGRESS NOTES
Progress Note  Podiatric Medicine and Surgery     Subjective     CC: Left foot infection    Patient seen and examined at bedside. S/P revisional I&D of left foot (DOS 1/5/2021)  No acute events overnight. Afebrile, vital signs stable   Leukocytosis trending down 28.2>17.0>12.2>11.7    HPI :  Tanvi Cardenas is a 63 y. o. male seen at [facility] the floor for a wound on the left foot. The patient was last seen in the emergency department at [phone] on 12/26/2020, at which time patient refused to be admitted so he was given a follow-up for podiatry for 12/28/2020, an appointment which patient missed. The patient is well known to Dr. Anthony Roy who performed an I&D and a revision of TMA 8/24/2020 of left lower extremity. Patient is also well known to Dr. Jonathon Martínez. Patient states he has been compliant with the antibiotic prescription given at his last emergency department visit. 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 Patient states he wears a diabetic shoe to the left lower extremity when ambulating. Of note, patient has a prior BKA to [facility] and reports he has fallen numerous times over the last week because of issues with his right lower extremity prosthetic. Patient denies hitting his head, losing consciousness or suffering any trauma with the falls. Patient admits to smoking on & off, up to 1 pack per day when smoking heavily. The patient denies any other pedal complaints at this time.     PCP is Riri Carrillo DO    ROS: Denies N/V/F/C/SOB/CP. Otherwise negative except at stated in the HPI.      Medications:  Scheduled Meds:   guaiFENesin  600 mg Oral BID    cefTRIAXone (ROCEPHIN) IV  2 g Intravenous Q24H    insulin glargine  60 Units Subcutaneous Nightly    sodium hypochlorite   Irrigation Daily    neomycin-bacitracin-polymyxin   Topical BID    apixaban  5 mg Oral BID    metFORMIN  500 mg Oral BID WC    nortriptyline  50 mg Oral Nightly  sodium chloride flush  10 mL Intravenous 2 times per day    insulin lispro  0-18 Units Subcutaneous TID WC    insulin lispro  0-9 Units Subcutaneous Nightly    famotidine  20 mg Oral BID       Continuous Infusions:   sodium chloride 75 mL/hr at 21    dextrose Stopped (21)       PRN Meds:ipratropium-albuterol, aluminum & magnesium hydroxide-simethicone, calcium carbonate, metoprolol, albuterol sulfate HFA, oxyCODONE-acetaminophen, sodium chloride flush, potassium chloride **OR** potassium alternative oral replacement **OR** potassium chloride, magnesium sulfate, promethazine **OR** ondansetron, polyethylene glycol, nicotine, acetaminophen **OR** acetaminophen, morphine **OR** morphine, glucose, dextrose, glucagon (rDNA), dextrose, diphenhydrAMINE    Objective     Vitals:  Patient Vitals for the past 8 hrs:   BP Temp Temp src Pulse Resp SpO2 Weight   21 0724 137/71 98.2 °F (36.8 °C) Oral 100 16 98 % --   21 0428 -- -- -- -- -- -- 149 lb 11.2 oz (67.9 kg)   21 0402 139/74 98.8 °F (37.1 °C) Oral 104 20 96 % --   21 0050 (!) 146/77 98.2 °F (36.8 °C) Oral 103 20 97 % --     Average, Min, and Max for last 24 hours Vitals:  TEMPERATURE:  Temp  Av.8 °F (36.6 °C)  Min: 97 °F (36.1 °C)  Max: 98.8 °F (37.1 °C)    RESPIRATIONS RANGE: Resp  Av.2  Min: 0  Max: 26    PULSE RANGE: Pulse  Av  Min: 89  Max: 105    BLOOD PRESSURE RANGE:  Systolic (76SCY), KQI:870 , Min:96 , YVJ:335   ; Diastolic (32YMR), MSA:46, Min:51, Max:84      PULSE OXIMETRY RANGE: SpO2  Av.7 %  Min: 96 %  Max: 100 %    I/O last 3 completed shifts:   In:  [I.V.:2133]  Out: 2622 [Urine:1630; Blood:30]    CBC:  Recent Labs     21  0723 21  0627 21  1710 21  0548   WBC 14.1* 12.2*  --  11.7*   HGB 10.0* 9.1* 9.6* 9.0*   HCT 34.1* 30.3* 32.6* 30.4*    415  --  467*        BMP:  Recent Labs     21  0721  0621  0548    138 135   K 3.8 3.3* 4.6    104 98   CO2 23 27 26   BUN 12 11 12   CREATININE 0.64* 0.68* 0.78   GLUCOSE 93 71 357*   CALCIUM 8.4* 8.4* 8.2*        Coags:  No results for input(s): APTT, PROT, INR in the last 72 hours. Lab Results   Component Value Date    SEDRATE 70 (H) 12/31/2020     No results for input(s): CRP in the last 72 hours. Left Lower Extremity Physical Exam:     Vascular: DP and PT pulses are Non-palpable, PT and AT audible on doppler.  CFT <3 seconds to dorsum of foot.  Hair growth is absent to the foot.  Moderate non-pitting edema to the left lower extremity.       Neuro: Saph/sural/SP/DP/plantar sensation diminished to light touch.     Musculoskeletal: Muscle strength is 4/5 against resistance to lower extremity muscle groups. Gross deformity is present, right LE BKA & left LE TMA. TTP absent to left foot, ankle, and calf.      Dermatologic:  Wound #1 located level of 5th metatarsal TMA site and measures approximately 5.0cm x 3.0 x 5.0cm deep. the wound base is fibro-granular with apparent healthy tissue. Periwound skin with minimal erythema.  Erythema noted proximally tracking up towards the ankle. Approximately 3 cc of candido drainage appreciated. Erythema present to dorso-lateral foot below level of ankle with associated mild increase in warmth. Scant purulent drainage also able to be expressed from the dorsal aspect of the wound with no associated mal odor. 5th metatarsal is exposed. No crepitus or induration pre- or post-debridement.      Wound #2 located level of first metatarsal TMA site, distally. Appears epithelialized. No periwound erythema.     Wound #3: 4cm incision along the plantar aspect of the foot. Skin edges approximated with sutures which remain intact. No purulence appreciated, bleeding appreciated from the wound edges. No purulence appreciated. Clinical Images:        Imaging:   XR CHEST PORTABLE   Final Result   No acute cardiopulmonary abnormality.          MRI FOOT LEFT W for further evaluation. Dressings changed today: Dakins soaked 1/2\" plain packing, DSD secured with tape.   · PWB to heel touch, left lower extremity in CAM boot  · Discussed with Dr. [facility] Cab[facility] DPM   Podiatric Medicine & Surgery   1/6/2021 at 8:25 AM
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
## 2021-01-07 PROBLEM — E44.0 MODERATE MALNUTRITION (HCC): Chronic | Status: ACTIVE | Noted: 2021-01-07

## 2021-01-07 LAB
ANION GAP SERPL CALCULATED.3IONS-SCNC: 10 MMOL/L (ref 9–17)
BUN BLDV-MCNC: 16 MG/DL (ref 8–23)
BUN/CREAT BLD: 18 (ref 9–20)
C-REACTIVE PROTEIN: 46.6 MG/L (ref 0–5)
CALCIUM SERPL-MCNC: 8.5 MG/DL (ref 8.6–10.4)
CHLORIDE BLD-SCNC: 103 MMOL/L (ref 98–107)
CO2: 25 MMOL/L (ref 20–31)
CREAT SERPL-MCNC: 0.9 MG/DL (ref 0.7–1.2)
GFR AFRICAN AMERICAN: >60 ML/MIN
GFR NON-AFRICAN AMERICAN: >60 ML/MIN
GFR SERPL CREATININE-BSD FRML MDRD: ABNORMAL ML/MIN/{1.73_M2}
GFR SERPL CREATININE-BSD FRML MDRD: ABNORMAL ML/MIN/{1.73_M2}
GLUCOSE BLD-MCNC: 126 MG/DL (ref 70–99)
GLUCOSE BLD-MCNC: 140 MG/DL (ref 75–110)
GLUCOSE BLD-MCNC: 171 MG/DL (ref 75–110)
GLUCOSE BLD-MCNC: 172 MG/DL (ref 75–110)
GLUCOSE BLD-MCNC: 175 MG/DL (ref 75–110)
HCT VFR BLD CALC: 29.4 % (ref 40.7–50.3)
HEMOGLOBIN: 8.6 G/DL (ref 13–17)
MCH RBC QN AUTO: 25.9 PG (ref 25.2–33.5)
MCHC RBC AUTO-ENTMCNC: 29.3 G/DL (ref 28.4–34.8)
MCV RBC AUTO: 88.6 FL (ref 82.6–102.9)
NRBC AUTOMATED: 0 PER 100 WBC
PDW BLD-RTO: 15.5 % (ref 11.8–14.4)
PLATELET # BLD: 531 K/UL (ref 138–453)
PMV BLD AUTO: 9.2 FL (ref 8.1–13.5)
POTASSIUM SERPL-SCNC: 4 MMOL/L (ref 3.7–5.3)
RBC # BLD: 3.32 M/UL (ref 4.21–5.77)
SODIUM BLD-SCNC: 138 MMOL/L (ref 135–144)
WBC # BLD: 10.4 K/UL (ref 3.5–11.3)

## 2021-01-07 PROCEDURE — 94640 AIRWAY INHALATION TREATMENT: CPT

## 2021-01-07 PROCEDURE — 2580000003 HC RX 258: Performed by: STUDENT IN AN ORGANIZED HEALTH CARE EDUCATION/TRAINING PROGRAM

## 2021-01-07 PROCEDURE — 82947 ASSAY GLUCOSE BLOOD QUANT: CPT

## 2021-01-07 PROCEDURE — 6370000000 HC RX 637 (ALT 250 FOR IP): Performed by: STUDENT IN AN ORGANIZED HEALTH CARE EDUCATION/TRAINING PROGRAM

## 2021-01-07 PROCEDURE — 80048 BASIC METABOLIC PNL TOTAL CA: CPT

## 2021-01-07 PROCEDURE — 6360000002 HC RX W HCPCS: Performed by: STUDENT IN AN ORGANIZED HEALTH CARE EDUCATION/TRAINING PROGRAM

## 2021-01-07 PROCEDURE — 85027 COMPLETE CBC AUTOMATED: CPT

## 2021-01-07 PROCEDURE — 99232 SBSQ HOSP IP/OBS MODERATE 35: CPT | Performed by: INTERNAL MEDICINE

## 2021-01-07 PROCEDURE — 86140 C-REACTIVE PROTEIN: CPT

## 2021-01-07 PROCEDURE — 1200000000 HC SEMI PRIVATE

## 2021-01-07 PROCEDURE — 6370000000 HC RX 637 (ALT 250 FOR IP): Performed by: INTERNAL MEDICINE

## 2021-01-07 PROCEDURE — 94761 N-INVAS EAR/PLS OXIMETRY MLT: CPT

## 2021-01-07 PROCEDURE — 36415 COLL VENOUS BLD VENIPUNCTURE: CPT

## 2021-01-07 RX ADMIN — FAMOTIDINE 20 MG: 20 TABLET, FILM COATED ORAL at 06:37

## 2021-01-07 RX ADMIN — METFORMIN HYDROCHLORIDE 500 MG: 500 TABLET ORAL at 08:25

## 2021-01-07 RX ADMIN — OXYCODONE AND ACETAMINOPHEN 2 TABLET: 5; 325 TABLET ORAL at 20:56

## 2021-01-07 RX ADMIN — ALUMINUM HYDROXIDE, MAGNESIUM HYDROXIDE, AND SIMETHICONE 30 ML: 200; 200; 20 SUSPENSION ORAL at 02:12

## 2021-01-07 RX ADMIN — INSULIN LISPRO 3 UNITS: 100 INJECTION, SOLUTION INTRAVENOUS; SUBCUTANEOUS at 11:45

## 2021-01-07 RX ADMIN — CEFTRIAXONE 2 G: 2 INJECTION, POWDER, FOR SOLUTION INTRAMUSCULAR; INTRAVENOUS at 08:25

## 2021-01-07 RX ADMIN — METFORMIN HYDROCHLORIDE 500 MG: 500 TABLET ORAL at 17:41

## 2021-01-07 RX ADMIN — INSULIN LISPRO 2 UNITS: 100 INJECTION, SOLUTION INTRAVENOUS; SUBCUTANEOUS at 20:56

## 2021-01-07 RX ADMIN — SODIUM CHLORIDE: 9 INJECTION, SOLUTION INTRAVENOUS at 11:54

## 2021-01-07 RX ADMIN — GUAIFENESIN 600 MG: 600 TABLET, EXTENDED RELEASE ORAL at 20:56

## 2021-01-07 RX ADMIN — NORTRIPTYLINE HYDROCHLORIDE 50 MG: 25 CAPSULE ORAL at 20:56

## 2021-01-07 RX ADMIN — MORPHINE SULFATE 4 MG: 4 INJECTION, SOLUTION INTRAMUSCULAR; INTRAVENOUS at 11:45

## 2021-01-07 RX ADMIN — INSULIN GLARGINE 35 UNITS: 100 INJECTION, SOLUTION SUBCUTANEOUS at 20:56

## 2021-01-07 RX ADMIN — MORPHINE SULFATE 4 MG: 4 INJECTION, SOLUTION INTRAMUSCULAR; INTRAVENOUS at 17:42

## 2021-01-07 RX ADMIN — BUDESONIDE AND FORMOTEROL FUMARATE DIHYDRATE 2 PUFF: 160; 4.5 AEROSOL RESPIRATORY (INHALATION) at 20:46

## 2021-01-07 RX ADMIN — INSULIN LISPRO 3 UNITS: 100 INJECTION, SOLUTION INTRAVENOUS; SUBCUTANEOUS at 17:41

## 2021-01-07 RX ADMIN — DAKIN'S SOLUTION 0.125% (QUARTER STRENGTH): 0.12 SOLUTION at 08:30

## 2021-01-07 RX ADMIN — APIXABAN 5 MG: 5 TABLET, FILM COATED ORAL at 08:25

## 2021-01-07 RX ADMIN — POLYMYXIN B SULFATE, BACITRACIN ZINC, NEOMYCIN SULFATE: 5000; 3.5; 4 OINTMENT TOPICAL at 08:30

## 2021-01-07 RX ADMIN — GUAIFENESIN 600 MG: 600 TABLET, EXTENDED RELEASE ORAL at 08:25

## 2021-01-07 RX ADMIN — FAMOTIDINE 20 MG: 20 TABLET, FILM COATED ORAL at 20:56

## 2021-01-07 RX ADMIN — MORPHINE SULFATE 4 MG: 4 INJECTION, SOLUTION INTRAMUSCULAR; INTRAVENOUS at 02:07

## 2021-01-07 RX ADMIN — APIXABAN 5 MG: 5 TABLET, FILM COATED ORAL at 20:56

## 2021-01-07 RX ADMIN — INSULIN GLARGINE 35 UNITS: 100 INJECTION, SOLUTION SUBCUTANEOUS at 08:25

## 2021-01-07 RX ADMIN — POLYMYXIN B SULFATE, BACITRACIN ZINC, NEOMYCIN SULFATE: 5000; 3.5; 4 OINTMENT TOPICAL at 23:05

## 2021-01-07 RX ADMIN — ANTACID TABLETS 500 MG: 500 TABLET, CHEWABLE ORAL at 03:31

## 2021-01-07 RX ADMIN — BUDESONIDE AND FORMOTEROL FUMARATE DIHYDRATE 2 PUFF: 160; 4.5 AEROSOL RESPIRATORY (INHALATION) at 10:06

## 2021-01-07 ASSESSMENT — PAIN DESCRIPTION - ONSET
ONSET: ON-GOING

## 2021-01-07 ASSESSMENT — PAIN DESCRIPTION - PAIN TYPE
TYPE: SURGICAL PAIN

## 2021-01-07 ASSESSMENT — PAIN DESCRIPTION - LOCATION
LOCATION: FOOT
LOCATION: FOOT

## 2021-01-07 ASSESSMENT — PAIN DESCRIPTION - PROGRESSION
CLINICAL_PROGRESSION: NOT CHANGED
CLINICAL_PROGRESSION: NOT CHANGED

## 2021-01-07 ASSESSMENT — PAIN SCALES - GENERAL: PAINLEVEL_OUTOF10: 7

## 2021-01-07 ASSESSMENT — PAIN DESCRIPTION - ORIENTATION
ORIENTATION: LEFT

## 2021-01-07 ASSESSMENT — PAIN DESCRIPTION - FREQUENCY
FREQUENCY: CONTINUOUS
FREQUENCY: CONTINUOUS

## 2021-01-07 ASSESSMENT — PAIN DESCRIPTION - DESCRIPTORS: DESCRIPTORS: CONSTANT;DISCOMFORT

## 2021-01-07 NOTE — PROGRESS NOTES
Patient brought to my attention that he would like a second opinion about his leg. He wants to try the Hyperbaric Chamber treatment at [facility]. [phone] and for appt [phone].

## 2021-01-07 NOTE — PLAN OF CARE
Nutrition Problem #1: Moderate malnutrition  Intervention: Food and/or Nutrient Delivery: Continue Current Diet, Start Oral Nutrition Supplement  Nutritional Goals: PO intakes to meet % of estimated nutrition needs

## 2021-01-07 NOTE — PROGRESS NOTES
Progress Note  Podiatric Medicine and Surgery     Subjective     CC: Left foot infection    Patient seen and examined at bedside. S/P revisional I&D of left foot (DOS 1/5/2021)  No acute events overnight. Afebrile, vital signs stable   Leukocytosis trending down 28.2>17.0>12.2>11.7>10.4  Patient apprehensive regarding BKA, interested in HBO     HPI :  Rosa Cardenas is a 63 y. o. male seen at [facility] the floor for a wound on the left foot. The patient was last seen in the emergency department at [address] on 12/26/2020, at which time patient refused to be admitted so he was given a follow-up for podiatry for 12/28/2020, an appointment which patient missed. The patient is well known to Dr. Ronnell Sarah who performed an I&D and a revision of TMA 8/24/2020 of left lower extremity. Patient is also well known to Dr. Carmencita Webster. Patient states he has been compliant with the antibiotic prescription given at his last emergency department visit. 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 Patient states he wears a diabetic shoe to the left lower extremity when ambulating. Of note, patient has a prior BKA to E and reports he has fallen numerous times over the last week because of issues with his right lower extremity prosthetic. Patient denies hitting his head, losing consciousness or suffering any trauma with the falls. Patient admits to smoking on & off, up to 1 pack per day when smoking heavily. The patient denies any other pedal complaints at this time.     PCP is Milton Post DO    ROS: Denies N/V/F/C/SOB/CP. Otherwise negative except at stated in the HPI.      Medications:  Scheduled Meds:   insulin glargine  35 Units Subcutaneous BID    budesonide-formoterol  2 puff Inhalation BID    guaiFENesin  600 mg Oral BID    cefTRIAXone (ROCEPHIN) IV  2 g Intravenous Q24H    sodium hypochlorite   Irrigation Daily    neomycin-bacitracin-polymyxin   Topical BID    apixaban  5 mg Oral BID    metFORMIN  500 mg Oral BID WC    nortriptyline  50 mg Oral Nightly    sodium chloride flush  10 mL Intravenous 2 times per day    insulin lispro  0-18 Units Subcutaneous TID     insulin lispro  0-9 Units Subcutaneous Nightly    famotidine  20 mg Oral BID       Continuous Infusions:   sodium chloride 75 mL/hr at 21 2126    dextrose Stopped (21 1753)       PRN Meds:ipratropium-albuterol, aluminum & magnesium hydroxide-simethicone, calcium carbonate, metoprolol, albuterol sulfate HFA, oxyCODONE-acetaminophen, sodium chloride flush, potassium chloride **OR** potassium alternative oral replacement **OR** potassium chloride, magnesium sulfate, promethazine **OR** ondansetron, polyethylene glycol, nicotine, acetaminophen **OR** acetaminophen, morphine **OR** morphine, glucose, dextrose, glucagon (rDNA), dextrose, diphenhydrAMINE    Objective     Vitals:  Patient Vitals for the past 8 hrs:   Weight   21 0528 148 lb 3.2 oz (67.2 kg)     Average, Min, and Max for last 24 hours Vitals:  TEMPERATURE:  Temp  Av.2 °F (36.8 °C)  Min: 98.2 °F (36.8 °C)  Max: 98.2 °F (36.8 °C)    RESPIRATIONS RANGE: Resp  Av  Min: 18  Max: 20    PULSE RANGE: Pulse  Av  Min: 99  Max: 99    BLOOD PRESSURE RANGE:  Systolic (15BTV), YQK:636 , Min:145 , PWB:225   ; Diastolic (31ZAS), MARIO ALBERTO:51, Min:79, Max:79      PULSE OXIMETRY RANGE: SpO2  Av.5 %  Min: 97 %  Max: 98 %    I/O last 3 completed shifts:  In: -   Out: 225 [Urine:225]    CBC:  Recent Labs     21  0627 21  1710 21  0548 21  0619   WBC 12.2*  --  11.7* 10.4   HGB 9.1* 9.6* 9.0* 8.6*   HCT 30.3* 32.6* 30.4* 29.4*     --  467* 531*        BMP:  Recent Labs     21  0627 21  0548 21  0619    135 138   K 3.3* 4.6 4.0    98 103   CO2 27 26 25   BUN 11 12 16   CREATININE 0.68* 0.78 0.90   GLUCOSE 71 357* 126*   CALCIUM 8.4* 8.2* 8.5*        Coags:  No results for input(s): APTT, PROT, INR in the last 72 hours. Lab Results   Component Value Date    SEDRATE 70 (H) 12/31/2020     No results for input(s): CRP in the last 72 hours. Left Lower Extremity Physical Exam:     Vascular: DP and PT pulses are Non-palpable, PT and AT audible on doppler.  CFT <3 seconds to dorsum of foot.  Hair growth is absent to the foot.  Moderate non-pitting edema to the left lower extremity.       Neuro: Saph/sural/SP/DP/plantar sensation diminished to light touch.     Musculoskeletal: Muscle strength is 4/5 against resistance to lower extremity muscle groups. Gross deformity is present, right LE BKA & left LE TMA. TTP absent to left foot, ankle, and calf.      Dermatologic:  Wound #1 located level of 5th metatarsal TMA site and measures approximately 5.0cm x 3.0 x 5.0cm deep. the wound base is fibro-granular with apparent healthy tissue. Erythema noted proximally tracking up towards the ankle. This appears improved since 2 days ago. scant candido purulent drainage appreciated. Erythema present to dorso-lateral foot below level of ankle with associated mild increase in warmth also mildly improved. Scant purulent drainage also able to be expressed from the dorsal aspect of the wound with no associated mal odor. 5th metatarsal is exposed. No crepitus or induration pre- or post-debridement.      Wound #2 located level of first metatarsal TMA site, distally. Appears epithelialized. No periwound erythema.     Wound #3: 4cm incision along the plantar aspect of the foot. Skin edges approximated with sutures which remain intact. No purulence appreciated, bleeding appreciated from the wound edges. No purulence appreciated. Clinical Images: from  1/6/2021       Imaging:   XR CHEST PORTABLE   Final Result   No acute cardiopulmonary abnormality.          MRI FOOT LEFT W WO CONTRAST   Final Result   Acute osteomyelitis of the remaining 5th metatarsal.      Patchy bone marrow edema is seen in the mellitus, with fat layer exposed ([facility]  Active Problems:    DM type 2 with diabetic peripheral neuropathy (HCC)    Chronic obstructive pulmonary disease (HCC)    HLD (hyperlipidemia)    Gastroesophageal reflux disease    Peripheral artery disease (HCC)    COPD exacerbation (HCC)    Below-knee amputation of right lower extremity ([facility]    Essential hypertension    Uncontrolled type 2 diabetes mellitus with hyperglycemia (HCC)    Anemia, normocytic normochromic    Osteomyelitis of metatarsals of left foot (HCC)    Diabetic foot infection ([facility]    Diabetic infection of left foot (HCC)    Hypocalcemia    Hypokalemia  Resolved Problems:    * No resolved hospital problems. *       Plan     · Patient examined and evaluated at bedside. · Surgical findings and treatment options discussed in detail with the patient. · Educated patient on the importance of smoking cessation to allow for optimal healing. · Educated patient on the importance of tight glycemic control for optimal healing. · Medical management per primary team.  · ABx per ID : Ceftriaxone  · An extesnive discussion was had with the patient regarding treatment options. Late last night, it was brought to the attention of the podiatry team that the patient would like to have a second opinion. Upon discussion today, patient states that he and his daughter have recently heard of hyperbaric oxygen therapy and are hopeful that this might be an option. Patient's daughter stated that she heard that hyperbaric oxygen \"cures gangrene\". It was explained to the patient the nature of hyperbaric oxygen therapy and the indications. It was explained to the patient and patient's daughter that we do not believe that hyperbaric oxygen therapy would be of much help especially with much of the infected tissue remaining present.   Explained to patient that we will contact Dr. Octavia Guerrero as requested, if Dr. Octavia Guerrero is unable to see Mr. Glinda Landau we will ask another provider for a second opinion. · CRP ordered  · Per vascular: Await patient decision regarding below-knee amputation versus continued management.   Dressings changed today: Dakins soaked 1/2\" plain packing, DSD secured with Ace wrap  · PWB to heel touch, left lower extremity in CAM boot  · Discussed with Dr. Quoc McgillKanakanak Hospitalamadeo Cabrera DPM   Podiatric Medicine & Surgery   1/7/2021 at 7:44 AM

## 2021-01-07 NOTE — PROGRESS NOTES
rehab on doxycycline and Augmentin x1 week.     8/23/2020 patient returned to [facility] saying he never received wound care. [facility] was found to have wound dehiscence with concern for underlying osteomyelitis.  8/24/2020 he underwent incision and drainage of the left foot with revision of left foot TMA.  At that time, cultures grew VRE, Proteus mirabilis, and coagulase-negative Staphylococcus.  He was discharged on oral ciprofloxacin and linezolid through 9/25/2020 to complete a 4-week course.     11/3/2020 patient returned to the emergency department due to drainage from his foot.  Patient was found to have TMA stump infection with underlying osteomyelitis of the metatarsals, first and fifth metatarsals did probe to the bone.  At that time, we did discuss with podiatry as well as the patient that he would likely need to undergo left BKA; however, they both opted to treat patient with 6 weeks of IV antibiotics. [facility] was discharged on 11/12/2020 on IV cefepime and linezolid to an extended care facility with plans to continue through 12/15/2020.  11/19/2020 patient left skilled nursing facility AMA.  IV cefepime was stopped at that time.  Patient continued on oral Zyvox. 11/23/2020 patient saw Dr. Onur Wilkerson in the office and patient was instructed to complete the oral linezolid.   Patient did follow-up again in the office 12/14/2020 and wound was smaller without any overt signs of infection at that time.     12/26/2020 patient presented to [facility] due to falling and having increasing pain and redness in the left foot.  WBC 16,900, CRP 67.5.  The wound probed to the bone at that time.  It was recommended for patient to be admitted, undergo debridement, and receive IV antibiotics.  Patient refused. [facility] was prescribed doxycycline 100 mg p.o. twice a day x10 days and patient signed out [address]     Patient returned to the emergency department today due to pain in the left foot. [facility] does Recent Labs     01/06/21  0548 01/07/21  0619   WBC 11.7* 10.4   HGB 9.0* 8.6*   HCT 30.4* 29.4*   * 531*     BMP:   Recent Labs     01/05/21  0627 01/06/21  0548 01/07/21 0619    135 138   K 3.3* 4.6 4.0    98 103   CO2 27 26 25   BUN 11 12 16   CREATININE 0.68* 0.78 0.90   MG 1.7  --   --      Hepatic Function Panel: No results for input(s): PROT, LABALBU, BILIDIR, IBILI, BILITOT, ALKPHOS, ALT, AST in the last 72 hours. No results found for: PROCAL  Lab Results   Component Value Date    CRP 46.6 01/07/2021    .8 01/02/2021    CRP 60.2 12/31/2020     Lab Results   Component Value Date    SEDRATE 70 (H) 12/31/2020         Lab Results   Component Value Date    DDIMER 0.39 06/29/2020    DDIMER 1.63 12/20/2017    DDIMER 0.68 12/25/2011     Lab Results   Component Value Date    FERRITIN 64 01/25/2014     No results found for: LDH  No results found for: FIBRINOGEN    Results in Past 30 Days  Result Component Current Result Ref Range Previous Result Ref Range   SARS-CoV-2      (12/31/2020)  Not in Time Range          (12/31/2020)       Not Detected (12/31/2020) Not Detected       Lab Results   Component Value Date    COVID19 Not Detected 12/31/2020    COVID19 Not Detected 11/10/2020    COVID19 Not Detected 08/23/2020    COVID19 Not Detected 07/29/2020       No results for input(s): VANCOTROUGH in the last 72 hours. Imaging Studies:   MRI OF THE LEFT FOOT WITH AND WITHOUT CONTRAST, 1/4/2021 7:51 am  Impression   Acute osteomyelitis of the remaining 5th metatarsal.       Patchy bone marrow edema is seen in the remaining 1st-4th metatarsals as well   as the cuboid without definite marrow replacement.  This could represent   reactive bone marrow edema versus early acute osteomyelitis.       Nonenhancing 1.5 x 0.6 x 2.2 cm T2 hyperintense lesion at the plantar aspect   of the 4th metatarsal suspicious for an abscess.       Nonspecific generalized plantar muscle edema.        Cultures: Culture, Blood 1 [[id number]] Collected: 12/31/20 1502   Order Status: Completed Specimen: Blood Updated: 01/06/21 0012    Specimen Description . BLOOD    Special Requests 8ML LAC    Culture NO GROWTH 6 DAYS   Culture, Blood 1 [[id number]] Collected: 12/31/20 0745   Order Status: Completed Specimen: Blood Updated: 01/06/21 0012    Specimen Description . BLOOD    Special Requests 11ML LINE DRAW    Culture NO GROWTH 6 DAYS   Culture, Blood 1 [[id number]] Collected: 12/31/20 0753   Order Status: Completed Specimen: Blood Updated: 01/06/21 0012    Specimen Description . BLOOD    Special Requests 8ML LEFT AC    Culture NO GROWTH 6 DAYS       Culture, Anaerobic and Aerobic [[id number]] (Abnormal)  Collected: 12/31/20 1907   Order Status: Completed Specimen: Foot Updated: 01/03/21 1655    Specimen Description . FOOT LEFT    Special Requests NOT REPORTED    Direct Exam NO NEUTROPHILS SEEN     RARE GRAM POSITIVE COCCI IN PAIRSAbnormal     Culture STREPTOCOCCI, BETA HEMOLYTIC GROUP B LIGHT GROWTHAbnormal      ESCHERICHIA COLI LIGHT GROWTHAbnormal      NO ANAEROBIC ORGANISMS ISOLATED AT 3 DAYSAbnormal    Escherichia coli (2)    Antibiotic Interpretation DARIAN Status    amikacin   Preliminary     NOT REPORTED    ampicillin Sensitive  Preliminary     4   SUSCEPTIBLE   ampicillin-sulbactam   Preliminary     NOT REPORTED    aztreonam Sensitive  Preliminary     <=1   SUSCEPTIBLE   ceFAZolin Sensitive  Preliminary     <=4   SUSCEPTIBLE   cefepime   Preliminary     NOT REPORTED    cefTRIAXone Sensitive  Preliminary     <=1   SUSCEPTIBLE   ciprofloxacin Sensitive  Preliminary     <=0.25   SUSCEPTIBLE   ertapenem   Preliminary     NOT REPORTED    Confirmatory Extended Spectrum Beta-Lactamase Negative NEGATIVE Preliminary    gentamicin Sensitive  Preliminary     <=1   SUSCEPTIBLE   meropenem   Preliminary     NOT REPORTED    nitrofurantoin   Preliminary     NOT REPORTED    tigecycline   Preliminary     NOT REPORTED    tobramycin Sensitive Preliminary     <=1   SUSCEPTIBLE   trimethoprim-sulfamethoxazole Sensitive  Preliminary     <=20   SUSCEPTIBLE   piperacillin-tazobactam Sensitive  Preliminary     <=4   SUSCEPTIBLE       Culture, Wound [[id number]] (Abnormal) Collected: 12/31/20 1240   Order Status: Completed Specimen: Foot Updated: 01/02/21 0994    Specimen Description . FOOT SWAB    Special Requests NOT REPORTED    Direct Exam RARE NEUTROPHILSAbnormal      RARE GRAM POSITIVE RODSAbnormal      RARE GRAM NEGATIVE RODSAbnormal     Culture NORMAL SKIN FLORAAbnormal      STREPTOCOCCI, BETA HEMOLYTIC GROUP B LIGHT GROWTHAbnormal      Medications:      insulin glargine  35 Units Subcutaneous BID    budesonide-formoterol  2 puff Inhalation BID    guaiFENesin  600 mg Oral BID    cefTRIAXone (ROCEPHIN) IV  2 g Intravenous Q24H    sodium hypochlorite   Irrigation Daily    neomycin-bacitracin-polymyxin   Topical BID    apixaban  5 mg Oral BID    metFORMIN  500 mg Oral BID WC    nortriptyline  50 mg Oral Nightly    sodium chloride flush  10 mL Intravenous 2 times per day    insulin lispro  0-18 Units Subcutaneous TID WC    insulin lispro  0-9 Units Subcutaneous Nightly    famotidine  20 mg Oral BID           [facility]  [address]  [employer]  OFFICE: [phone]      Electronically signed by [address] [place] [address] Street, MD on 1/7/2021 at 6:57 PM  Thank you for allowing us to participate in the care of this patient. Please call with questions. This note iscreated with the assistance of a speech recognition program.  While intending to generate a document that actually reflects the content of the visit, the document can still have some errors including those of syntax andsound a like substitutions which may escape proof reading. In such instances, actual meaning can be extrapolated by contextual diversion.
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
## 2021-01-07 NOTE — PROGRESS NOTES
After discussion with patient and Dr. Mariela Cheng. Patient would like to try hyperbaric oxygen therapy. Patient will need to be following with wound care to be able to begin treatment. At this time, patient will require home healthcare at discharge. Patient will require daily dressing changes consisting of Dakin's soaked plain packing, DSD, kerlix secured with tape. Patient will be ok for Discharge once home healthcare has been coordinated, patient has chosen a wound care center he would like to attend, and infectious disease places their final antibiotic recommendations.

## 2021-01-07 NOTE — CARE COORDINATION
Spoke to patient regarding home care for daily dressing changes to lt foot. Spoke to Retie from [facility] with referral and face sheet faxed. They will review for availability.

## 2021-01-07 NOTE — PLAN OF CARE
Problem: Serum Glucose Level - Abnormal:  Goal: Ability to maintain appropriate glucose levels will improve  Description: Ability to maintain appropriate glucose levels will improve  Outcome: Ongoing  Note: Patient's blood sugars continue to be checked before meals and before bed. Sliding scale insulin available for blood sugars 140 or greater. Physician updated as needed. Problem: PAIN  Goal: Patient's pain/discomfort is manageable  1/7/2021 0845 by Mamta Luna RN  Outcome: Ongoing  Note: Pt medicated with pain medication prn. Assessed all pain characteristics including level, type, location, frequency, and onset. Non-pharmacologic interventions offered to pt as well. Pt states pain is tolerable at this time.  Will continue to monitor

## 2021-01-07 NOTE — PROGRESS NOTES
VASCULAR SURGERY  PROGRESS NOTE      1/7/2021 2:12 PM  Subjective:   Admit Date: 12/31/2020  PCP: Lavell Brumfield DO    Chief Complaint   Patient presents with    Foot Pain     L side     Interval History: No complaints. Above-noted. Patient would like to try hyperbaric oxygen therapy. Diet: DIET CARB CONTROL; Carb Control: 3 carb choices (45 gms)/meal    Medications:   Scheduled Meds:   insulin glargine  35 Units Subcutaneous BID    budesonide-formoterol  2 puff Inhalation BID    guaiFENesin  600 mg Oral BID    cefTRIAXone (ROCEPHIN) IV  2 g Intravenous Q24H    sodium hypochlorite   Irrigation Daily    neomycin-bacitracin-polymyxin   Topical BID    apixaban  5 mg Oral BID    metFORMIN  500 mg Oral BID WC    nortriptyline  50 mg Oral Nightly    sodium chloride flush  10 mL Intravenous 2 times per day    insulin lispro  0-18 Units Subcutaneous TID WC    insulin lispro  0-9 Units Subcutaneous Nightly    famotidine  20 mg Oral BID     Continuous Infusions:   dextrose Stopped (01/05/21 1753)         Labs:   CBC:   Recent Labs     01/05/21  0627 01/05/21  1710 01/06/21  0548 01/07/21  0619   WBC 12.2*  --  11.7* 10.4   HGB 9.1* 9.6* 9.0* 8.6*     --  467* 531*     BMP:    Recent Labs     01/05/21  0627 01/06/21  0548 01/07/21  0619    135 138   K 3.3* 4.6 4.0    98 103   CO2 27 26 25   BUN 11 12 16   CREATININE 0.68* 0.78 0.90   GLUCOSE 71 357* 126*     Hepatic: No results for input(s): AST, ALT, ALB, BILITOT, ALKPHOS in the last 72 hours. Troponin: Invalid input(s): TROPONIN  BNP: No results for input(s): BNP in the last 72 hours. Lipids: No results for input(s): CHOL, HDL in the last 72 hours. Invalid input(s): LDLCALCU  INR: No results for input(s): INR in the last 72 hours.     Objective:   Vitals: BP (!) 120/59   Pulse 102   Temp 97.9 °F (36.6 °C) (Oral)   Resp 16   Ht 6' 1\" (1.854 m)   Wt 148 lb 3.2 oz (67.2 kg)   SpO2 97%   BMI 19.55 kg/m²   General appearance: alert, cooperative and no distress  Mental Status: oriented to person, place and time with normal affect  Neck: good carotid pulses, no JVD  Lungs: clear to auscultation bilaterally, normal effort  Heart: regular rate and rhythm, no murmur,  Abdomen: soft, non-tender, non-distended, bowel sounds present all four quadrants, no masses, hepatomegaly, splenomegaly or aortic enlargement  Extremities: peripheral pulses by Doppler, right BKA site healed, left TMA with open ulcer, no erythema, positive bleeding at skin edges      Assessment:   3 61-year-old male with left TMA ulcer/foot infection  2. PAD  3. Diabetes mellitus  4.  Noncompliance    Patient Active Problem List:     Type 2 diabetes mellitus with diabetic polyneuropathy, with long-term current use of insulin (HCC)     Neuropathic pain, leg     Diabetic polyneuropathy (HCC)     Allergic rhinitis     Tobacco dependence     Edentulous     Dysphagia     Lung nodules     Esophageal cancer (HCC)     Fracture of humerus, left, closed     Closed fracture of humerus     DM type 2 with diabetic peripheral neuropathy (HCC)     Chronic obstructive pulmonary disease (HCC)     HLD (hyperlipidemia)     Gastroesophageal reflux disease     Chronic pain syndrome     Marijuana use     History of colon polyps     Cellulitis of right heel     Functional diarrhea     Sepsis due to methicillin resistant Staphylococcus aureus (MRSA) ([facility])     History of esophageal cancer     Osteomyelitis, chronic, ankle or foot ([address]     Peripheral artery disease ([address]     Other pulmonary embolism without acute cor pulmonale (HCC)     Carotid stenosis, asymptomatic, bilateral     Lower limb amputation status     Fx humeral neck, right, closed, initial encounter     Transient hypotension     Constipation due to opioid therapy     Acute kidney injury ([address]     Diabetic ulcer of left midfoot associated with type 2 diabetes mellitus, with fat layer exposed ([address]     Complication of below knee amputation stump (HCC)     COPD exacerbation (HCC)     Hyponatremia     Pain, phantom limb ([address]     Below-knee amputation of right lower extremity (HCC)     Moderate protein malnutrition ([address]     Essential hypertension     Uncontrolled type 2 diabetes mellitus with hyperglycemia ([address]     History of pulmonary embolism - 2017     Atelectasis     Uncontrolled type 2 diabetes mellitus with foot ulcer (HCC)     Azotemia     Anemia, normocytic normochromic     Osteomyelitis of fourth phalange of left foot (HCC)     Drug-induced constipation     Osteomyelitis (HCC)     Diabetic foot infection ([address]     Noncompliance     Type 1 diabetes mellitus with diabetic foot infection ([address]     Acute osteomyelitis of left foot (HCC)     Cellulitis of left foot     Mild malnutrition (HCC)     Diabetic infection of left foot (HCC)     Hypocalcemia     Hypokalemia      Plan:   1. Continue ABX and dressing changes  2. Smoking cessation  3. Hyperbaric oxygen therapy  4.    Patient understands strict blood sugar control as necessary for limb salvage    Electronically signed by Sarah Haynes MD on 1/7/2021 at 2:12 PM

## 2021-01-07 NOTE — PROGRESS NOTES
Comprehensive Nutrition Assessment    Type and Reason for Visit:  Initial(LOS day 7)    Nutrition Recommendations/Plan:   1. Continue Carb Control diet  2. Start Glucerna oral supplements 3x daily. 3. Monitor PO intakes, weight and labs    Nutrition Assessment:  Patient admitted with diabetic foot infection. Nutritionally compromised due to 9% weight loss in last 2 months, underweight BMI 19.55 and increased nutrient needs for wound healing. Will start Glucerna 3s daily to meet increased nutrient needs, monitor PO intakes, weight and wound healing. Malnutrition Assessment:  Malnutrition Status: Moderate malnutrition    Context:  Chronic Illness     Findings of the 6 clinical characteristics of malnutrition:  Energy Intake:  7 - 75% or less estimated energy requirements for 1 month or longer  Weight Loss:  7 - Greater than 7.5% over 3 months     Body Fat Loss:  Unable to assess     Muscle Mass Loss:  Unable to assess    Fluid Accumulation:  No significant fluid accumulation     Strength:  Not Performed    Estimated Daily Nutrient Needs:  Energy (kcal):  9067-7217 kcal based on 28-30 kcal/kg of admission; Weight Used for Energy Requirements:  Admission     Protein (g):   gm based on 1.3-1.5 gm/kg of admission; Weight Used for Protein Requirements:  Admission        Nutrition Related Findings:  No edema. Bowel sounds active      Wounds:  Open Wounds, Multiple       Current Nutrition Therapies:    DIET CARB CONTROL; Carb Control: 3 carb choices (45 gms)/meal    Anthropometric Measures:  · Height: 6' 1\" (185.4 cm)  · Current Body Weight: 148 lb 3.2 oz (67.2 kg)   · Admission Body Weight: 156 lb (70.8 kg)    · Usual Body Weight: 161 lb (73 kg)     · Ideal Body Weight: 184 lbs; % Ideal Body Weight 80.5 %   · BMI: 19.6  · Adjusted Body Weight:  ; No Adjustment   · BMI Categories: Normal Weight (BMI 18.5-24. 9)       Nutrition Diagnosis:   · Moderate malnutrition related to inadequate protein-energy intake, endocrine dysfuntion, increase demand for energy/nutrients as evidenced by wounds, poor intake prior to admission      Nutrition Interventions:   Food and/or Nutrient Delivery:  Continue Current Diet, Start Oral Nutrition Supplement  Nutrition Education/Counseling:  Education not indicated   Coordination of Nutrition Care:  Continue to monitor while inpatient    Goals:  PO intakes to meet % of estimated nutrition needs       Nutrition Monitoring and Evaluation:   Behavioral-Environmental Outcomes:  None Identified   Food/Nutrient Intake Outcomes:  Food and Nutrient Intake, Supplement Intake  Physical Signs/Symptoms Outcomes:  Biochemical Data, Skin, Weight, Fluid Status or Edema     Discharge Planning:     Too soon to determine       Some areas of assessment may be incomplete due to COVID-19 precautions    Korina Quintana RD, LD  Office phone [phone]

## 2021-01-08 LAB
ANION GAP SERPL CALCULATED.3IONS-SCNC: 9 MMOL/L (ref 9–17)
BUN BLDV-MCNC: 16 MG/DL (ref 8–23)
BUN/CREAT BLD: 21 (ref 9–20)
CALCIUM SERPL-MCNC: 9 MG/DL (ref 8.6–10.4)
CHLORIDE BLD-SCNC: 105 MMOL/L (ref 98–107)
CO2: 27 MMOL/L (ref 20–31)
CREAT SERPL-MCNC: 0.77 MG/DL (ref 0.7–1.2)
GFR AFRICAN AMERICAN: >60 ML/MIN
GFR NON-AFRICAN AMERICAN: >60 ML/MIN
GFR SERPL CREATININE-BSD FRML MDRD: ABNORMAL ML/MIN/{1.73_M2}
GFR SERPL CREATININE-BSD FRML MDRD: ABNORMAL ML/MIN/{1.73_M2}
GLUCOSE BLD-MCNC: 113 MG/DL (ref 75–110)
GLUCOSE BLD-MCNC: 114 MG/DL (ref 75–110)
GLUCOSE BLD-MCNC: 135 MG/DL (ref 75–110)
GLUCOSE BLD-MCNC: 139 MG/DL (ref 70–99)
GLUCOSE BLD-MCNC: 171 MG/DL (ref 75–110)
HCT VFR BLD CALC: 31.4 % (ref 40.7–50.3)
HEMOGLOBIN: 9.1 G/DL (ref 13–17)
MCH RBC QN AUTO: 25.9 PG (ref 25.2–33.5)
MCHC RBC AUTO-ENTMCNC: 29 G/DL (ref 28.4–34.8)
MCV RBC AUTO: 89.2 FL (ref 82.6–102.9)
NRBC AUTOMATED: 0 PER 100 WBC
PDW BLD-RTO: 15.6 % (ref 11.8–14.4)
PLATELET # BLD: 544 K/UL (ref 138–453)
PMV BLD AUTO: 8.7 FL (ref 8.1–13.5)
POTASSIUM SERPL-SCNC: 4.1 MMOL/L (ref 3.7–5.3)
RBC # BLD: 3.52 M/UL (ref 4.21–5.77)
SODIUM BLD-SCNC: 141 MMOL/L (ref 135–144)
WBC # BLD: 9.6 K/UL (ref 3.5–11.3)

## 2021-01-08 PROCEDURE — 82947 ASSAY GLUCOSE BLOOD QUANT: CPT

## 2021-01-08 PROCEDURE — 2580000003 HC RX 258: Performed by: STUDENT IN AN ORGANIZED HEALTH CARE EDUCATION/TRAINING PROGRAM

## 2021-01-08 PROCEDURE — 6360000002 HC RX W HCPCS: Performed by: STUDENT IN AN ORGANIZED HEALTH CARE EDUCATION/TRAINING PROGRAM

## 2021-01-08 PROCEDURE — 94640 AIRWAY INHALATION TREATMENT: CPT

## 2021-01-08 PROCEDURE — 99232 SBSQ HOSP IP/OBS MODERATE 35: CPT | Performed by: INTERNAL MEDICINE

## 2021-01-08 PROCEDURE — 94761 N-INVAS EAR/PLS OXIMETRY MLT: CPT

## 2021-01-08 PROCEDURE — 6370000000 HC RX 637 (ALT 250 FOR IP): Performed by: INTERNAL MEDICINE

## 2021-01-08 PROCEDURE — 6370000000 HC RX 637 (ALT 250 FOR IP): Performed by: STUDENT IN AN ORGANIZED HEALTH CARE EDUCATION/TRAINING PROGRAM

## 2021-01-08 PROCEDURE — 1200000000 HC SEMI PRIVATE

## 2021-01-08 PROCEDURE — 85027 COMPLETE CBC AUTOMATED: CPT

## 2021-01-08 PROCEDURE — 36415 COLL VENOUS BLD VENIPUNCTURE: CPT

## 2021-01-08 PROCEDURE — 80048 BASIC METABOLIC PNL TOTAL CA: CPT

## 2021-01-08 RX ORDER — PANTOPRAZOLE SODIUM 40 MG/1
40 TABLET, DELAYED RELEASE ORAL
Status: DISCONTINUED | OUTPATIENT
Start: 2021-01-08 | End: 2021-01-11 | Stop reason: HOSPADM

## 2021-01-08 RX ADMIN — DAKIN'S SOLUTION 0.125% (QUARTER STRENGTH): 0.12 SOLUTION at 08:20

## 2021-01-08 RX ADMIN — SODIUM CHLORIDE, PRESERVATIVE FREE 10 ML: 5 INJECTION INTRAVENOUS at 08:20

## 2021-01-08 RX ADMIN — FAMOTIDINE 20 MG: 20 TABLET, FILM COATED ORAL at 05:23

## 2021-01-08 RX ADMIN — INSULIN GLARGINE 35 UNITS: 100 INJECTION, SOLUTION SUBCUTANEOUS at 08:21

## 2021-01-08 RX ADMIN — ALUMINUM HYDROXIDE, MAGNESIUM HYDROXIDE, AND SIMETHICONE 30 ML: 200; 200; 20 SUSPENSION ORAL at 20:28

## 2021-01-08 RX ADMIN — METFORMIN HYDROCHLORIDE 500 MG: 500 TABLET ORAL at 08:20

## 2021-01-08 RX ADMIN — INSULIN LISPRO 3 UNITS: 100 INJECTION, SOLUTION INTRAVENOUS; SUBCUTANEOUS at 18:03

## 2021-01-08 RX ADMIN — CEFTRIAXONE 2 G: 2 INJECTION, POWDER, FOR SOLUTION INTRAMUSCULAR; INTRAVENOUS at 08:20

## 2021-01-08 RX ADMIN — POLYMYXIN B SULFATE, BACITRACIN ZINC, NEOMYCIN SULFATE: 5000; 3.5; 4 OINTMENT TOPICAL at 08:20

## 2021-01-08 RX ADMIN — BUDESONIDE AND FORMOTEROL FUMARATE DIHYDRATE 2 PUFF: 160; 4.5 AEROSOL RESPIRATORY (INHALATION) at 20:01

## 2021-01-08 RX ADMIN — METFORMIN HYDROCHLORIDE 500 MG: 500 TABLET ORAL at 18:04

## 2021-01-08 RX ADMIN — MORPHINE SULFATE 4 MG: 4 INJECTION, SOLUTION INTRAMUSCULAR; INTRAVENOUS at 08:23

## 2021-01-08 RX ADMIN — MORPHINE SULFATE 4 MG: 4 INJECTION, SOLUTION INTRAMUSCULAR; INTRAVENOUS at 00:16

## 2021-01-08 RX ADMIN — GUAIFENESIN 600 MG: 600 TABLET, EXTENDED RELEASE ORAL at 20:29

## 2021-01-08 RX ADMIN — MORPHINE SULFATE 4 MG: 4 INJECTION, SOLUTION INTRAMUSCULAR; INTRAVENOUS at 04:15

## 2021-01-08 RX ADMIN — ALUMINUM HYDROXIDE, MAGNESIUM HYDROXIDE, AND SIMETHICONE 30 ML: 200; 200; 20 SUSPENSION ORAL at 05:37

## 2021-01-08 RX ADMIN — APIXABAN 5 MG: 5 TABLET, FILM COATED ORAL at 20:29

## 2021-01-08 RX ADMIN — APIXABAN 5 MG: 5 TABLET, FILM COATED ORAL at 08:20

## 2021-01-08 RX ADMIN — MORPHINE SULFATE 4 MG: 4 INJECTION, SOLUTION INTRAMUSCULAR; INTRAVENOUS at 20:29

## 2021-01-08 RX ADMIN — NORTRIPTYLINE HYDROCHLORIDE 50 MG: 25 CAPSULE ORAL at 20:28

## 2021-01-08 RX ADMIN — GUAIFENESIN 600 MG: 600 TABLET, EXTENDED RELEASE ORAL at 08:20

## 2021-01-08 RX ADMIN — SODIUM CHLORIDE, PRESERVATIVE FREE 10 ML: 5 INJECTION INTRAVENOUS at 21:34

## 2021-01-08 RX ADMIN — PANTOPRAZOLE SODIUM 40 MG: 40 TABLET, DELAYED RELEASE ORAL at 18:04

## 2021-01-08 RX ADMIN — ONDANSETRON 4 MG: 2 INJECTION INTRAMUSCULAR; INTRAVENOUS at 05:37

## 2021-01-08 RX ADMIN — BUDESONIDE AND FORMOTEROL FUMARATE DIHYDRATE 2 PUFF: 160; 4.5 AEROSOL RESPIRATORY (INHALATION) at 10:10

## 2021-01-08 RX ADMIN — OXYCODONE AND ACETAMINOPHEN 2 TABLET: 5; 325 TABLET ORAL at 18:03

## 2021-01-08 RX ADMIN — POLYMYXIN B SULFATE, BACITRACIN ZINC, NEOMYCIN SULFATE: 5000; 3.5; 4 OINTMENT TOPICAL at 21:00

## 2021-01-08 RX ADMIN — OXYCODONE AND ACETAMINOPHEN 2 TABLET: 5; 325 TABLET ORAL at 11:32

## 2021-01-08 ASSESSMENT — PAIN - FUNCTIONAL ASSESSMENT
PAIN_FUNCTIONAL_ASSESSMENT: PREVENTS OR INTERFERES SOME ACTIVE ACTIVITIES AND ADLS
PAIN_FUNCTIONAL_ASSESSMENT: ACTIVITIES ARE NOT PREVENTED

## 2021-01-08 ASSESSMENT — PAIN DESCRIPTION - PROGRESSION
CLINICAL_PROGRESSION: NOT CHANGED
CLINICAL_PROGRESSION: NOT CHANGED

## 2021-01-08 ASSESSMENT — PAIN DESCRIPTION - ORIENTATION
ORIENTATION: LEFT

## 2021-01-08 ASSESSMENT — PAIN DESCRIPTION - DESCRIPTORS
DESCRIPTORS: CONSTANT;DISCOMFORT
DESCRIPTORS: CONSTANT;DISCOMFORT
DESCRIPTORS: CONSTANT;SHARP
DESCRIPTORS: THROBBING

## 2021-01-08 ASSESSMENT — PAIN DESCRIPTION - PAIN TYPE
TYPE: CHRONIC PAIN
TYPE: SURGICAL PAIN

## 2021-01-08 ASSESSMENT — ENCOUNTER SYMPTOMS
RESPIRATORY NEGATIVE: 1
ALLERGIC/IMMUNOLOGIC NEGATIVE: 1
GASTROINTESTINAL NEGATIVE: 1

## 2021-01-08 ASSESSMENT — PAIN SCALES - GENERAL
PAINLEVEL_OUTOF10: 8
PAINLEVEL_OUTOF10: 7

## 2021-01-08 ASSESSMENT — PAIN DESCRIPTION - FREQUENCY: FREQUENCY: CONTINUOUS

## 2021-01-08 ASSESSMENT — PAIN DESCRIPTION - ONSET
ONSET: ON-GOING

## 2021-01-08 ASSESSMENT — PAIN DESCRIPTION - LOCATION
LOCATION: FOOT

## 2021-01-08 NOTE — FLOWSHEET NOTE
Patient is awake and alert while sitting up in bed. Patient reports that he is busy paying bills on his phone. Writer offers to return another time. Patient seems to accept this. Spiritual Care will follow as needed.        01/08/21 1015   Encounter Summary   Services provided to: Patient   Referral/Consult From: Family;Rounding   Support System Family members   Continue Visiting   (1/8/21)   Complexity of Encounter Low   Length of Encounter 15 minutes   Routine   Type Follow up   Assessment Approachable   Intervention Active listening   Outcome Acceptance

## 2021-01-08 NOTE — PROGRESS NOTES
He underwent left superficial femoral, popliteal, tibial, peroneal trunk, and posterior tibial artery atherectomy/balloon angioplasty 7/29/2020.  Patient underwent left TMA with Achilles tendon lengthening 8/5/2020. Darrian Mary was discharged 8/7/2020 to rehab on doxycycline and Augmentin x1 week.     8/23/2020 patient returned to [facility] saying he never received wound care. [facility] was found to have wound dehiscence with concern for underlying osteomyelitis.  8/24/2020 he underwent incision and drainage of the left foot with revision of left foot TMA.  At that time, cultures grew VRE, Proteus mirabilis, and coagulase-negative Staphylococcus.  He was discharged on oral ciprofloxacin and linezolid through 9/25/2020 to complete a 4-week course.     11/3/2020 patient returned to the emergency department due to drainage from his foot.  Patient was found to have TMA stump infection with underlying osteomyelitis of the metatarsals, first and fifth metatarsals did probe to the bone.  At that time, we did discuss with podiatry as well as the patient that he would likely need to undergo left BKA; however, they both opted to treat patient with 6 weeks of IV antibiotics. [facility] was discharged on 11/12/2020 on IV cefepime and linezolid to an extended care facility with plans to continue through 12/15/2020.  11/19/2020 patient left skilled nursing facility AMA.  IV cefepime was stopped at that time.  Patient continued on oral Zyvox. 11/23/2020 patient saw Dr. Ann Rivera in the office and patient was instructed to complete the oral linezolid.   Patient did follow-up again in the office 12/14/2020 and wound was smaller without any overt signs of infection at that time.     12/26/2020 patient presented to [facility] due to falling and having increasing pain and redness in the left foot.  WBC 16,900, CRP 67.5.  The wound probed to the bone at that time.  It was recommended for patient to be admitted, undergo debridement, and receive IV antibiotics.  Patient refused. [facility] was prescribed doxycycline 100 mg p.o. twice a day x10 days and patient signed out [address]     Patient returned to the emergency department today due to pain in the left foot. [facility] does tell me that he attempted to go to his podiatrist at the beginning of the week but could not get in. Candis Seeds arrival to the emergency department, it was noted that he had the same dressing on his foot that was placed in the emergency department last week. [facility] did rate pain 7 out of 10, throbbing and constant.  He has not had any documented fevers but does have hot and cold flashes.  Patient does not really have any other complaints aside from wanting pain medication.     Patient has been started on vancomycin and Zosyn.  We have been consulted to assist with antimicrobial therapy management.     2021 incision and drainage of the left foot with removal of nonviable bone and soft tissue    Current evaluation:2021    /63   Pulse 97   Temp 98.2 °F (36.8 °C) (Oral)   Resp 16   Ht 6' 1\" (1.854 m)   Wt 149 lb 6.4 oz (67.8 kg)   SpO2 96%   BMI 19.71 kg/m²     Temperature Range: Temp: 98.2 °F (36.8 °C) Temp  Av.3 °F (36.8 °C)  Min: 98.2 °F (36.8 °C)  Max: 98.6 °F (37 °C)  The patient is seen and evaluated at bedside he is awake and alert in no acute distress. No cough or shortness of breath. Abdominal pain nausea vomiting. He wants to go to [address] [place] [address] to be evaluated for hyperbaric oxygen treatment on Monday. I did discuss with him that he will need placement. Review of Systems   Constitutional: Negative. Respiratory: Negative. Cardiovascular: Negative. Gastrointestinal: Negative. Genitourinary: Negative. Musculoskeletal: Negative. Skin: Positive for wound. Allergic/Immunologic: Negative. Neurological: Negative. Physical Examination :     Physical Exam  Constitutional:       Appearance: He is well-developed. last 72 hours. Imaging Studies:   MRI OF THE LEFT FOOT WITH AND WITHOUT CONTRAST, 1/4/2021 7:51 am  Impression   Acute osteomyelitis of the remaining 5th metatarsal.       Patchy bone marrow edema is seen in the remaining 1st-4th metatarsals as well   as the cuboid without definite marrow replacement.  This could represent   reactive bone marrow edema versus early acute osteomyelitis.       Nonenhancing 1.5 x 0.6 x 2.2 cm T2 hyperintense lesion at the plantar aspect   of the 4th metatarsal suspicious for an abscess.       Nonspecific generalized plantar muscle edema. Cultures:     Culture, Blood 1 [[id number]] Collected: 12/31/20 1502   Order Status: Completed Specimen: Blood Updated: 01/06/21 0012    Specimen Description . BLOOD    Special Requests 8ML LAC    Culture NO GROWTH 6 DAYS   Culture, Blood 1 [[id number]] Collected: 12/31/20 0745   Order Status: Completed Specimen: Blood Updated: 01/06/21 0012    Specimen Description . BLOOD    Special Requests 11ML LINE DRAW    Culture NO GROWTH 6 DAYS   Culture, Blood 1 [[id number]] Collected: 12/31/20 0753   Order Status: Completed Specimen: Blood Updated: 01/06/21 0012    Specimen Description . BLOOD    Special Requests 8ML LEFT AC    Culture NO GROWTH 6 DAYS       Culture, Anaerobic and Aerobic [[id number]] (Abnormal)  Collected: 12/31/20 1907   Order Status: Completed Specimen: Foot Updated: 01/03/21 1655    Specimen Description . FOOT LEFT    Special Requests NOT REPORTED    Direct Exam NO NEUTROPHILS SEEN     RARE GRAM POSITIVE COCCI IN PAIRSAbnormal     Culture STREPTOCOCCI, BETA HEMOLYTIC GROUP B LIGHT GROWTHAbnormal      ESCHERICHIA COLI LIGHT GROWTHAbnormal      NO ANAEROBIC ORGANISMS ISOLATED AT 3 DAYSAbnormal    Escherichia coli (2)    Antibiotic Interpretation DARIAN Status    amikacin   Preliminary     NOT REPORTED    ampicillin Sensitive  Preliminary     4   SUSCEPTIBLE   ampicillin-sulbactam   Preliminary     NOT REPORTED    aztreonam Sensitive Preliminary     <=1   SUSCEPTIBLE   ceFAZolin Sensitive  Preliminary     <=4   SUSCEPTIBLE   cefepime   Preliminary     NOT REPORTED    cefTRIAXone Sensitive  Preliminary     <=1   SUSCEPTIBLE   ciprofloxacin Sensitive  Preliminary     <=0.25   SUSCEPTIBLE   ertapenem   Preliminary     NOT REPORTED    Confirmatory Extended Spectrum Beta-Lactamase Negative NEGATIVE Preliminary    gentamicin Sensitive  Preliminary     <=1   SUSCEPTIBLE   meropenem   Preliminary     NOT REPORTED    nitrofurantoin   Preliminary     NOT REPORTED    tigecycline   Preliminary     NOT REPORTED    tobramycin Sensitive  Preliminary     <=1   SUSCEPTIBLE   trimethoprim-sulfamethoxazole Sensitive  Preliminary     <=20   SUSCEPTIBLE   piperacillin-tazobactam Sensitive  Preliminary     <=4   SUSCEPTIBLE       Culture, Wound [[id number]] (Abnormal) Collected: 12/31/20 1240   Order Status: Completed Specimen: Foot Updated: 01/02/21 0940    Specimen Description . FOOT SWAB    Special Requests NOT REPORTED    Direct Exam RARE NEUTROPHILSAbnormal      RARE GRAM POSITIVE RODSAbnormal      RARE GRAM NEGATIVE RODSAbnormal     Culture NORMAL SKIN FLORAAbnormal      STREPTOCOCCI, BETA HEMOLYTIC GROUP B LIGHT GROWTHAbnormal      Medications:      insulin glargine  35 Units Subcutaneous BID    budesonide-formoterol  2 puff Inhalation BID    guaiFENesin  600 mg Oral BID    cefTRIAXone (ROCEPHIN) IV  2 g Intravenous Q24H    sodium hypochlorite   Irrigation Daily    neomycin-bacitracin-polymyxin   Topical BID    apixaban  5 mg Oral BID    metFORMIN  500 mg Oral BID WC    nortriptyline  50 mg Oral Nightly    sodium chloride flush  10 mL Intravenous 2 times per day    insulin lispro  0-18 Units Subcutaneous TID WC    insulin lispro  0-9 Units Subcutaneous Nightly    famotidine  20 mg Oral BID           [facility]  [address]  [employer] messaging  OFFICE: [phone]      Electronically signed by [place] May Lizbeth Mcconnell MD on 1/8/2021 at 2:27 PM  Thank you for allowing us to participate in the care of this patient. Please call with questions. This note iscreated with the assistance of a speech recognition program.  While intending to generate a document that actually reflects the content of the visit, the document can still have some errors including those of syntax andsound a like substitutions which may escape proof reading. In such instances, actual meaning can be extrapolated by contextual diversion.

## 2021-01-08 NOTE — PLAN OF CARE
Problem: Serum Glucose Level - Abnormal:  Goal: Ability to maintain appropriate glucose levels will improve  Description: Ability to maintain appropriate glucose levels will improve  Outcome: Ongoing  Note: Patient's blood sugars continue to be checked before meals and before bed. Sliding scale insulin available for blood sugars 140 or greater. Physician updated as needed. Problem: SAFETY  Goal: Free from accidental physical injury  Outcome: Ongoing  Note: Patient is a fall risk during this admission. Fall risk assessment was performed. Patient is absent of falls. Bed is in the lowest position. Wheels on the bed are locked. Call light and bed side table are within reach. Clutter is removed. Patient was educated to call out when needing assistance or wanting to get out of bed. Patient offered toileting assistance during rounding. Hourly rounds have been performed. Problem: PAIN  Goal: Patient's pain/discomfort is manageable  Outcome: Ongoing  Note: Pt medicated with pain medication prn. Assessed all pain characteristics including level, type, location, frequency, and onset. Non-pharmacologic interventions offered to pt as well. Pt states pain is tolerable at this time.  Will continue to monitor
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
## 2021-01-08 NOTE — PROGRESS NOTES
neomycin-bacitracin-polymyxin   Topical BID    apixaban  5 mg Oral BID    metFORMIN  500 mg Oral BID     nortriptyline  50 mg Oral Nightly    sodium chloride flush  10 mL Intravenous 2 times per day    insulin lispro  0-18 Units Subcutaneous TID WC    insulin lispro  0-9 Units Subcutaneous Nightly    famotidine  20 mg Oral BID       Continuous Infusions:   dextrose Stopped (21 1753)       PRN Meds:ipratropium-albuterol, aluminum & magnesium hydroxide-simethicone, calcium carbonate, metoprolol, albuterol sulfate HFA, oxyCODONE-acetaminophen, sodium chloride flush, potassium chloride **OR** potassium alternative oral replacement **OR** potassium chloride, magnesium sulfate, promethazine **OR** ondansetron, polyethylene glycol, nicotine, acetaminophen **OR** acetaminophen, morphine **OR** morphine, glucose, dextrose, glucagon (rDNA), dextrose, diphenhydrAMINE    Objective     Vitals:  Patient Vitals for the past 8 hrs:   BP Temp Temp src Pulse Resp SpO2 Weight   21 0735 (!) 121/55 98.6 °F (37 °C) Oral 89 16 97 % --   21 0430 -- -- -- -- -- -- 149 lb 6.4 oz (67.8 kg)     Average, Min, and Max for last 24 hours Vitals:  TEMPERATURE:  Temp  Av.4 °F (36.9 °C)  Min: 98.2 °F (36.8 °C)  Max: 98.6 °F (37 °C)    RESPIRATIONS RANGE: Resp  Av  Min: 16  Max: 16    PULSE RANGE: Pulse  Av  Min: 89  Max: 109    BLOOD PRESSURE RANGE:  Systolic (23EZG), JBN:613 , Min:121 , PAC:665   ; Diastolic (17KXK), HWU:25, Min:55, Max:90      PULSE OXIMETRY RANGE: SpO2  Av %  Min: 97 %  Max: 97 %    I/O last 3 completed shifts:  In: -   Out: 525 [Urine:525]    CBC:  Recent Labs     21  0548 21  0619 21  0611   WBC 11.7* 10.4 9.6   HGB 9.0* 8.6* 9.1*   HCT 30.4* 29.4* 31.4*   * 531* 544*   CRP  --  46.6*  --         BMP:  Recent Labs     21  0548 21  0619 21  0611    138 141   K 4.6 4.0 4.1   CL 98 103 105   CO2 26 25 27   BUN 12 16 16   CREATININE 0.78 0. 90 0.77   GLUCOSE 357* 126* 139*   CALCIUM 8.2* 8.5* 9.0        Coags:  No results for input(s): APTT, PROT, INR in the last 72 hours. Lab Results   Component Value Date    SEDRATE 70 (H) 12/31/2020     Recent Labs     01/07/21  0619   CRP 46.6*       Left Lower Extremity Physical Exam:     Vascular: DP and PT pulses are Non-palpable, PT and AT audible on doppler.  CFT <3 seconds to distal foot stump.  Hair growth is absent to the foot. Mild non-pitting edema to the left lower extremity.       Neuro: Saph/sural/SP/DP/plantar sensation diminished to light touch.     Musculoskeletal: Muscle strength is 4/5 against resistance to lower extremity muscle groups. Gross deformity is present, right LE BKA & left LE TMA. No tenderness to palpation of the surgical site.     Dermatologic:  Wound #1 located level of plantar and lateral foot at the level of the midfoot. Retention sutures intact plantarly. Lateral foot wound measures approximately 5.0cm x 3.0 x 5.0 cm deep. The wound base is fibro-granular with exposed cuboid. No erythema appreciated. Scant serous drainage expressed, no purulence. Clinical Images:         Imaging:   XR CHEST PORTABLE   Final Result   No acute cardiopulmonary abnormality. MRI FOOT LEFT W WO CONTRAST   Final Result   Acute osteomyelitis of the remaining 5th metatarsal.      Patchy bone marrow edema is seen in the remaining 1st-4th metatarsals as well   as the cuboid without definite marrow replacement. This could represent   reactive bone marrow edema versus early acute osteomyelitis. Nonenhancing 1.5 x 0.6 x 2.2 cm T2 hyperintense lesion at the plantar aspect   of the 4th metatarsal suspicious for an abscess. Nonspecific generalized plantar muscle edema.          VL Arterial PVR Lower   Final Result      XR FOOT LEFT (MIN 3 VIEWS)   Final Result   Ulceration about the remaining 5th metatarsal appears larger in the interval.   While the underlying bone appears unchanged in the interval, osteomyelitis is   not excluded. No soft tissue gas identified. Cultures:      Culture, Anaerobic and Aerobic [[id number]] (Abnormal)  Collected: 12/31/20 1907   Order Status: Completed Specimen: Foot Updated: 01/05/21 1545    Specimen Description . FOOT LEFT    Special Requests NOT REPORTED    Direct Exam NO NEUTROPHILS SEEN     RARE GRAM POSITIVE COCCI IN PAIRSAbnormal     Culture STREPTOCOCCI, BETA HEMOLYTIC GROUP B LIGHT GROWTHAbnormal      ESCHERICHIA COLI LIGHT GROWTHAbnormal      NORMAL ANNA MARIE     NO ANAEROBIC ORGANISMS ISOLATED AT 5 DAYSAbnormal    Culture, Wound [[id number]] (Abnormal) Collected: 12/31/20 1240   Order Status: Completed Specimen: Foot Updated: 01/02/21 0900    Specimen Description . FOOT SWAB    Special Requests NOT REPORTED    Direct Exam RARE NEUTROPHILSAbnormal      RARE GRAM POSITIVE RODSAbnormal      RARE GRAM NEGATIVE RODSAbnormal     Culture NORMAL SKIN FLORAAbnormal      STREPTOCOCCI, BETA HEMOLYTIC GROUP B LIGHT GROWTHAbnormal          Assessment   Grady Norwood is a 63 y.o. male with   1. Osteomyelitis 5th metatarsal, left foot  2. Cellulitis, Left LE, improving  3. S/p revision of TMA & I&D (DOS: 1/5/2021), LLE  4. 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 Previous BKA, RLE   6. COPD  7.  CAD    Principal Problem:    Diabetic ulcer of left midfoot associated with type 2 diabetes mellitus, with fat layer exposed ([address]  Active Problems:    DM type 2 with diabetic peripheral neuropathy (HCC)    Chronic obstructive pulmonary disease (HCC)    HLD (hyperlipidemia)    Gastroesophageal reflux disease    Peripheral artery disease (HCC)    COPD exacerbation (HCC)    Below-knee amputation of right lower extremity ([address]    Essential hypertension    Uncontrolled type 2 diabetes mellitus with hyperglycemia (HCC)    Anemia, normocytic normochromic    Osteomyelitis of metatarsals of left foot (HCC)    Diabetic foot infection (HCC)    Diabetic infection of

## 2021-01-08 NOTE — CARE COORDINATION
Social work: writer informed patient considering snf. Referral to [facility], [place] and [employer]- faxed to all. Phone call to from [facility], they are out of network. [    Phone call from Alex's referral is being reviewed.

## 2021-01-08 NOTE — CARE COORDINATION
Discharge Planning    Pt is requesting a SNF. ID has recommended 6 weeks of IV antibiotics. Spoke with patient about his choices and he is agreeable to [facility] or [facility]. Pt states his daughter suggested he be evaluated for hyperbarbic oxygen treatment for his wound. Pt states he has an appt 1-11 at [facility]. Phone call to Anshu Kidd at [phone] to confirm appointment and plan of care but could only leave a message.  Melania Bustillo will follow up with SNF referrals and will keep patient updated.     Chikis Klein at [facility] updated that plan has changed to SNF

## 2021-01-09 LAB
ANION GAP SERPL CALCULATED.3IONS-SCNC: 8 MMOL/L (ref 9–17)
BUN BLDV-MCNC: 21 MG/DL (ref 8–23)
BUN/CREAT BLD: 21 (ref 9–20)
CALCIUM SERPL-MCNC: 9.1 MG/DL (ref 8.6–10.4)
CHLORIDE BLD-SCNC: 104 MMOL/L (ref 98–107)
CO2: 28 MMOL/L (ref 20–31)
CREAT SERPL-MCNC: 1 MG/DL (ref 0.7–1.2)
GFR AFRICAN AMERICAN: >60 ML/MIN
GFR NON-AFRICAN AMERICAN: >60 ML/MIN
GFR SERPL CREATININE-BSD FRML MDRD: ABNORMAL ML/MIN/{1.73_M2}
GFR SERPL CREATININE-BSD FRML MDRD: ABNORMAL ML/MIN/{1.73_M2}
GLUCOSE BLD-MCNC: 173 MG/DL (ref 75–110)
GLUCOSE BLD-MCNC: 198 MG/DL (ref 75–110)
GLUCOSE BLD-MCNC: 210 MG/DL (ref 70–99)
GLUCOSE BLD-MCNC: 241 MG/DL (ref 75–110)
GLUCOSE BLD-MCNC: 94 MG/DL (ref 75–110)
HCT VFR BLD CALC: 29.8 % (ref 40.7–50.3)
HEMOGLOBIN: 8.6 G/DL (ref 13–17)
MCH RBC QN AUTO: 26 PG (ref 25.2–33.5)
MCHC RBC AUTO-ENTMCNC: 28.9 G/DL (ref 28.4–34.8)
MCV RBC AUTO: 90 FL (ref 82.6–102.9)
NRBC AUTOMATED: 0 PER 100 WBC
PDW BLD-RTO: 15.4 % (ref 11.8–14.4)
PLATELET # BLD: 534 K/UL (ref 138–453)
PMV BLD AUTO: 8.8 FL (ref 8.1–13.5)
POTASSIUM SERPL-SCNC: 4.6 MMOL/L (ref 3.7–5.3)
RBC # BLD: 3.31 M/UL (ref 4.21–5.77)
SODIUM BLD-SCNC: 140 MMOL/L (ref 135–144)
WBC # BLD: 10.4 K/UL (ref 3.5–11.3)

## 2021-01-09 PROCEDURE — 6360000002 HC RX W HCPCS: Performed by: STUDENT IN AN ORGANIZED HEALTH CARE EDUCATION/TRAINING PROGRAM

## 2021-01-09 PROCEDURE — 94640 AIRWAY INHALATION TREATMENT: CPT

## 2021-01-09 PROCEDURE — 97163 PT EVAL HIGH COMPLEX 45 MIN: CPT

## 2021-01-09 PROCEDURE — 80048 BASIC METABOLIC PNL TOTAL CA: CPT

## 2021-01-09 PROCEDURE — 97535 SELF CARE MNGMENT TRAINING: CPT

## 2021-01-09 PROCEDURE — 6370000000 HC RX 637 (ALT 250 FOR IP): Performed by: STUDENT IN AN ORGANIZED HEALTH CARE EDUCATION/TRAINING PROGRAM

## 2021-01-09 PROCEDURE — 36415 COLL VENOUS BLD VENIPUNCTURE: CPT

## 2021-01-09 PROCEDURE — 2580000003 HC RX 258: Performed by: STUDENT IN AN ORGANIZED HEALTH CARE EDUCATION/TRAINING PROGRAM

## 2021-01-09 PROCEDURE — 1200000000 HC SEMI PRIVATE

## 2021-01-09 PROCEDURE — 97530 THERAPEUTIC ACTIVITIES: CPT

## 2021-01-09 PROCEDURE — 6370000000 HC RX 637 (ALT 250 FOR IP): Performed by: INTERNAL MEDICINE

## 2021-01-09 PROCEDURE — 82947 ASSAY GLUCOSE BLOOD QUANT: CPT

## 2021-01-09 PROCEDURE — 85027 COMPLETE CBC AUTOMATED: CPT

## 2021-01-09 PROCEDURE — 97166 OT EVAL MOD COMPLEX 45 MIN: CPT

## 2021-01-09 PROCEDURE — 99232 SBSQ HOSP IP/OBS MODERATE 35: CPT | Performed by: INTERNAL MEDICINE

## 2021-01-09 PROCEDURE — 94761 N-INVAS EAR/PLS OXIMETRY MLT: CPT

## 2021-01-09 RX ORDER — OXYCODONE HYDROCHLORIDE AND ACETAMINOPHEN 5; 325 MG/1; MG/1
1 TABLET ORAL EVERY 6 HOURS PRN
Qty: 28 TABLET | Refills: 0 | Status: SHIPPED | OUTPATIENT
Start: 2021-01-09 | End: 2021-01-16

## 2021-01-09 RX ADMIN — DAKIN'S SOLUTION 0.125% (QUARTER STRENGTH): 0.12 SOLUTION at 00:44

## 2021-01-09 RX ADMIN — APIXABAN 5 MG: 5 TABLET, FILM COATED ORAL at 07:52

## 2021-01-09 RX ADMIN — POLYMYXIN B SULFATE, BACITRACIN ZINC, NEOMYCIN SULFATE: 5000; 3.5; 4 OINTMENT TOPICAL at 07:56

## 2021-01-09 RX ADMIN — APIXABAN 5 MG: 5 TABLET, FILM COATED ORAL at 21:11

## 2021-01-09 RX ADMIN — POLYMYXIN B SULFATE, BACITRACIN ZINC, NEOMYCIN SULFATE: 5000; 3.5; 4 OINTMENT TOPICAL at 21:11

## 2021-01-09 RX ADMIN — ALUMINUM HYDROXIDE, MAGNESIUM HYDROXIDE, AND SIMETHICONE 30 ML: 200; 200; 20 SUSPENSION ORAL at 00:31

## 2021-01-09 RX ADMIN — PANTOPRAZOLE SODIUM 40 MG: 40 TABLET, DELAYED RELEASE ORAL at 07:57

## 2021-01-09 RX ADMIN — MORPHINE SULFATE 4 MG: 4 INJECTION, SOLUTION INTRAMUSCULAR; INTRAVENOUS at 19:24

## 2021-01-09 RX ADMIN — INSULIN LISPRO 6 UNITS: 100 INJECTION, SOLUTION INTRAVENOUS; SUBCUTANEOUS at 17:53

## 2021-01-09 RX ADMIN — BUDESONIDE AND FORMOTEROL FUMARATE DIHYDRATE 2 PUFF: 160; 4.5 AEROSOL RESPIRATORY (INHALATION) at 09:57

## 2021-01-09 RX ADMIN — GUAIFENESIN 600 MG: 600 TABLET, EXTENDED RELEASE ORAL at 07:53

## 2021-01-09 RX ADMIN — Medication 10 ML: at 10:20

## 2021-01-09 RX ADMIN — CEFTRIAXONE 2 G: 2 INJECTION, POWDER, FOR SOLUTION INTRAMUSCULAR; INTRAVENOUS at 07:57

## 2021-01-09 RX ADMIN — MORPHINE SULFATE 4 MG: 4 INJECTION, SOLUTION INTRAMUSCULAR; INTRAVENOUS at 02:49

## 2021-01-09 RX ADMIN — OXYCODONE AND ACETAMINOPHEN 2 TABLET: 5; 325 TABLET ORAL at 17:58

## 2021-01-09 RX ADMIN — OXYCODONE AND ACETAMINOPHEN 2 TABLET: 5; 325 TABLET ORAL at 09:09

## 2021-01-09 RX ADMIN — INSULIN GLARGINE 35 UNITS: 100 INJECTION, SOLUTION SUBCUTANEOUS at 07:51

## 2021-01-09 RX ADMIN — MORPHINE SULFATE 4 MG: 4 INJECTION, SOLUTION INTRAMUSCULAR; INTRAVENOUS at 10:16

## 2021-01-09 RX ADMIN — SODIUM CHLORIDE, PRESERVATIVE FREE 10 ML: 5 INJECTION INTRAVENOUS at 21:11

## 2021-01-09 RX ADMIN — BUDESONIDE AND FORMOTEROL FUMARATE DIHYDRATE 2 PUFF: 160; 4.5 AEROSOL RESPIRATORY (INHALATION) at 18:00

## 2021-01-09 RX ADMIN — NORTRIPTYLINE HYDROCHLORIDE 50 MG: 25 CAPSULE ORAL at 21:11

## 2021-01-09 RX ADMIN — METFORMIN HYDROCHLORIDE 500 MG: 500 TABLET ORAL at 17:54

## 2021-01-09 RX ADMIN — ANTACID TABLETS 500 MG: 500 TABLET, CHEWABLE ORAL at 02:55

## 2021-01-09 RX ADMIN — DAKIN'S SOLUTION 0.125% (QUARTER STRENGTH): 0.12 SOLUTION at 09:10

## 2021-01-09 RX ADMIN — SODIUM CHLORIDE, PRESERVATIVE FREE 10 ML: 5 INJECTION INTRAVENOUS at 07:53

## 2021-01-09 RX ADMIN — ONDANSETRON 4 MG: 2 INJECTION INTRAMUSCULAR; INTRAVENOUS at 10:19

## 2021-01-09 RX ADMIN — INSULIN LISPRO 3 UNITS: 100 INJECTION, SOLUTION INTRAVENOUS; SUBCUTANEOUS at 07:52

## 2021-01-09 RX ADMIN — INSULIN LISPRO 2 UNITS: 100 INJECTION, SOLUTION INTRAVENOUS; SUBCUTANEOUS at 21:10

## 2021-01-09 RX ADMIN — INSULIN GLARGINE 35 UNITS: 100 INJECTION, SOLUTION SUBCUTANEOUS at 21:10

## 2021-01-09 RX ADMIN — OXYCODONE AND ACETAMINOPHEN 2 TABLET: 5; 325 TABLET ORAL at 00:30

## 2021-01-09 RX ADMIN — METFORMIN HYDROCHLORIDE 500 MG: 500 TABLET ORAL at 07:53

## 2021-01-09 RX ADMIN — MORPHINE SULFATE 4 MG: 4 INJECTION, SOLUTION INTRAMUSCULAR; INTRAVENOUS at 23:13

## 2021-01-09 RX ADMIN — ALUMINUM HYDROXIDE, MAGNESIUM HYDROXIDE, AND SIMETHICONE 30 ML: 200; 200; 20 SUSPENSION ORAL at 12:23

## 2021-01-09 RX ADMIN — GUAIFENESIN 600 MG: 600 TABLET, EXTENDED RELEASE ORAL at 21:11

## 2021-01-09 ASSESSMENT — PAIN DESCRIPTION - PAIN TYPE
TYPE: ACUTE PAIN
TYPE: ACUTE PAIN
TYPE: CHRONIC PAIN
TYPE: ACUTE PAIN

## 2021-01-09 ASSESSMENT — PAIN DESCRIPTION - ONSET
ONSET: ON-GOING

## 2021-01-09 ASSESSMENT — PAIN DESCRIPTION - PROGRESSION
CLINICAL_PROGRESSION: GRADUALLY WORSENING
CLINICAL_PROGRESSION: GRADUALLY WORSENING
CLINICAL_PROGRESSION: NOT CHANGED

## 2021-01-09 ASSESSMENT — PAIN SCALES - GENERAL
PAINLEVEL_OUTOF10: 9
PAINLEVEL_OUTOF10: 6
PAINLEVEL_OUTOF10: 7
PAINLEVEL_OUTOF10: 7

## 2021-01-09 ASSESSMENT — PAIN DESCRIPTION - FREQUENCY
FREQUENCY: CONTINUOUS

## 2021-01-09 ASSESSMENT — PAIN DESCRIPTION - DESCRIPTORS
DESCRIPTORS: THROBBING
DESCRIPTORS: CONSTANT
DESCRIPTORS: THROBBING
DESCRIPTORS: CONSTANT
DESCRIPTORS: CONSTANT

## 2021-01-09 ASSESSMENT — PAIN DESCRIPTION - LOCATION
LOCATION: FOOT

## 2021-01-09 ASSESSMENT — PAIN DESCRIPTION - ORIENTATION
ORIENTATION: LEFT
ORIENTATION: LEFT
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
## 2021-01-09 NOTE — CARE COORDINATION
Social work: Phone call from [facility], patient is denied. Met with patient to inform. New referrals faxed to [facility], [facility], [facility]. Referrals sent to all.
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
## 2021-01-09 NOTE — PLAN OF CARE
Problem: Discharge Planning:  Goal: Discharged to appropriate level of care  Description: Discharged to appropriate level of care  1/9/2021 1008 by Ander Pat RN  Outcome: Ongoing  1/9/2021 0334 by Pino Sebastian RN  Outcome: Ongoing  Note: Patient being discharged to SNF     Problem: Serum Glucose Level - Abnormal:  Goal: Ability to maintain appropriate glucose levels will improve  Description: Ability to maintain appropriate glucose levels will improve  1/9/2021 1008 by Ander Pat RN  Outcome: Ongoing  1/9/2021 0334 by Pino Sebastian RN  Outcome: Ongoing  Note: Serum glucose levels improving, will continue to monitor      Problem: Sensory Perception - Impaired:  Goal: Ability to maintain a stable neurologic state will improve  Description: Ability to maintain a stable neurologic state will improve  Outcome: Ongoing     Problem: SAFETY  Goal: Free from accidental physical injury  1/9/2021 1008 by Ander Pat RN  Outcome: Ongoing  1/9/2021 0334 by Pino Sebastian RN  Outcome: Ongoing  Note: Hourly rounding, call light within reach, bed locked and lowered, door open, bed alarm on   Goal: Free from intentional harm  Outcome: Ongoing     Problem: DAILY CARE  Goal: Daily care needs are met  Outcome: Ongoing     Problem: PAIN  Goal: Patient's pain/discomfort is manageable  1/9/2021 1008 by Ander Pat RN  Outcome: Ongoing  1/9/2021 0334 by Pino Sebastian RN  Outcome: Ongoing  Note: Patient still requires a lot of pain medicine, will continue to monitor       Problem: SKIN INTEGRITY  Goal: Skin integrity is maintained or improved  Outcome: Ongoing     Problem: KNOWLEDGE DEFICIT  Goal: Patient/S.O. demonstrates understanding of disease process, treatment plan, medications, and discharge instructions.   Outcome: Ongoing     Problem: DISCHARGE BARRIERS  Goal: Patient's continuum of care needs are met  Outcome: Ongoing     Problem: Nutrition  Goal: Optimal nutrition therapy  Outcome: Ongoing     Problem:

## 2021-01-09 NOTE — PLAN OF CARE
Problem: Discharge Planning:  Goal: Discharged to appropriate level of care  Description: Discharged to appropriate level of care  Outcome: Ongoing  Note: Patient being discharged to SNF     Problem: Serum Glucose Level - Abnormal:  Goal: Ability to maintain appropriate glucose levels will improve  Description: Ability to maintain appropriate glucose levels will improve  Outcome: Ongoing  Note: Serum glucose levels improving, will continue to monitor      Problem: SAFETY  Goal: Free from accidental physical injury  Outcome: Ongoing  Note: Hourly rounding, call light within reach, bed locked and lowered, door open, bed alarm on      Problem: PAIN  Goal: Patient's pain/discomfort is manageable  Outcome: Ongoing  Note: Patient still requires a lot of pain medicine, will continue to monitor

## 2021-01-09 NOTE — PROGRESS NOTES
Progress Note  Podiatric Medicine and Surgery     Subjective     CC: Left foot infection    Patient seen and examined at bedside. S/P revisional I&D of left foot (DOS 1/5/2021)  Afebrile, vital signs stable   Reports 1 episode of emesis, denies nausea, fever or chills  Pain unchanged from yesterday to L foot. HPI :  Catracho Cardenas is a 63 y. o. male seen at [facility] the floor for a wound on the left foot. The patient was last seen in the emergency department at [phone] on 12/26/2020, at which time patient refused to be admitted so he was given a follow-up for podiatry for 12/28/2020, an appointment which patient missed. The patient is well known to Dr. Monse Rodríguez who performed an I&D and a revision of TMA 8/24/2020 of left lower extremity. Patient is also well known to Dr. Michael Taylor. Patient states he has been compliant with the antibiotic prescription given at his last emergency department visit. 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 Patient states he wears a diabetic shoe to the left lower extremity when ambulating. Of note, patient has a prior BKA to [facility] and reports he has fallen numerous times over the last week because of issues with his right lower extremity prosthetic. Patient denies hitting his head, losing consciousness or suffering any trauma with the falls. Patient admits to smoking on & off, up to 1 pack per day when smoking heavily. The patient denies any other pedal complaints at this time.     PCP is Angie Malik DO    ROS: Denies N/V/F/C/SOB/CP. Otherwise negative except at stated in the HPI.      Medications:  Scheduled Meds:   pantoprazole  40 mg Oral QAM AC    insulin glargine  35 Units Subcutaneous BID    budesonide-formoterol  2 puff Inhalation BID    guaiFENesin  600 mg Oral BID    cefTRIAXone (ROCEPHIN) IV  2 g Intravenous Q24H    sodium hypochlorite   Irrigation Daily    neomycin-bacitracin-polymyxin 9.0 9.1        Coags:  No results for input(s): APTT, PROT, INR in the last 72 hours. Lab Results   Component Value Date    SEDRATE 70 (H) 12/31/2020     Recent Labs     01/07/21  0619   CRP 46.6*       Left Lower Extremity Physical Exam:     Vascular: DP and PT pulses are Non-palpable, PT and AT audible on doppler.  CFT <3 seconds to distal foot stump.  Hair growth is absent to the foot. Mild non-pitting edema to the left lower extremity.       Neuro: Saph/sural/SP/DP/plantar sensation diminished to light touch.     Musculoskeletal: Muscle strength is 4/5 against resistance to lower extremity muscle groups. Gross deformity is present, right LE BKA & left LE TMA. No tenderness to palpation of the surgical site. Neg pain on calf squeeze LLE     Dermatologic:  Wound #1 located level of plantar and lateral foot at the level of the midfoot. Retention sutures intact plantarly. Lateral foot wound measures approximately 5.0cm x 3.0 x 5.0 cm deep. The wound base is fibro-granular with exposed cuboid. No erythema or increase in warmth. Scant serous drainage expressed, no purulence. Clinical Images:               Imaging:   XR CHEST PORTABLE   Final Result   No acute cardiopulmonary abnormality. MRI FOOT LEFT W WO CONTRAST   Final Result   Acute osteomyelitis of the remaining 5th metatarsal.      Patchy bone marrow edema is seen in the remaining 1st-4th metatarsals as well   as the cuboid without definite marrow replacement. This could represent   reactive bone marrow edema versus early acute osteomyelitis. Nonenhancing 1.5 x 0.6 x 2.2 cm T2 hyperintense lesion at the plantar aspect   of the 4th metatarsal suspicious for an abscess. Nonspecific generalized plantar muscle edema.          VL Arterial PVR Lower   Final Result      XR FOOT LEFT (MIN 3 VIEWS)   Final Result   Ulceration about the remaining 5th metatarsal appears larger in the interval.   While the underlying bone appears unchanged in the interval, osteomyelitis is   not excluded. No soft tissue gas identified. IR INSERT PICC VAD W SQ PORT >5 YEARS    (Results Pending)       Cultures:      Culture, Anaerobic and Aerobic [[id number]] (Abnormal)  Collected: 12/31/20 1907   Order Status: Completed Specimen: Foot Updated: 01/05/21 1545    Specimen Description . FOOT LEFT    Special Requests NOT REPORTED    Direct Exam NO NEUTROPHILS SEEN     RARE GRAM POSITIVE COCCI IN PAIRSAbnormal     Culture STREPTOCOCCI, BETA HEMOLYTIC GROUP B LIGHT GROWTHAbnormal      ESCHERICHIA COLI LIGHT GROWTHAbnormal      NORMAL ANNA MARIE     NO ANAEROBIC ORGANISMS ISOLATED AT 5 DAYSAbnormal    Culture, Wound [[id number]] (Abnormal) Collected: 12/31/20 1240   Order Status: Completed Specimen: Foot Updated: 01/02/21 0940    Specimen Description . FOOT SWAB    Special Requests NOT REPORTED    Direct Exam RARE NEUTROPHILSAbnormal      RARE GRAM POSITIVE RODSAbnormal      RARE GRAM NEGATIVE RODSAbnormal     Culture NORMAL SKIN FLORAAbnormal      STREPTOCOCCI, BETA HEMOLYTIC GROUP B LIGHT GROWTHAbnormal          Assessment   Grady Norwood is a 63 y.o. male with   1. S/p revision of TMA & I&D (DOS: 1/5/2021), LLE  2. Type II DM with secondary peripheral neuropathy  3. Previous BKA, RLE   4. COPD  5.  CAD    Principal Problem:    Diabetic ulcer of left midfoot associated with type 2 diabetes mellitus, with fat layer exposed ([address]  Active Problems:    DM type 2 with diabetic peripheral neuropathy (HCC)    Chronic obstructive pulmonary disease (HCC)    HLD (hyperlipidemia)    Gastroesophageal reflux disease    Peripheral artery disease (HCC)    COPD exacerbation (HCC)    Below-knee amputation of right lower extremity ([address]    Essential hypertension    Uncontrolled type 2 diabetes mellitus with hyperglycemia (HCC)    Anemia, normocytic normochromic    Osteomyelitis of metatarsals of left foot ([address]    Diabetic foot infection ([address]    Diabetic infection of left foot ([address] Hypocalcemia    Hypokalemia    Moderate malnutrition (HCC)  Resolved Problems:    * No resolved hospital problems. *       Plan     · Patient examined and evaluated at bedside. · Surgical findings and treatment options discussed in detail with the patient. · Educated patient on the importance of smoking cessation as well as tight glucose control to allow for optimal healing. · Medical management per primary team.  · Abx per ID : Ceftriaxone  · Patient defers BKA and elects to proceed with wound care at this time. Patient to follow-up with Dr. Alessia Amador at [place] [address] wound care within 1 week of discharge. Awaiting placement to facility. · No acute podiatric surgical intervention at this time. Podiatry to sign off at this time. Please [employer] with any questions or concerns. Please reconsult if patient still inpatient status within 1 week. Thank you for allowing us to participate this patient's care. Nursing order to apply daily dressing changes.    · Dressings changed today: Dakins soaked 4x4, 1/2\" plain packing, DSD  · NWB L foot  · Discussed /w Dr. Abdullahi Cash, [address]   Podiatric Medicine & Surgery   1/9/2021 at 8:20 AM

## 2021-01-10 LAB
ANION GAP SERPL CALCULATED.3IONS-SCNC: 8 MMOL/L (ref 9–17)
BUN BLDV-MCNC: 22 MG/DL (ref 8–23)
BUN/CREAT BLD: 31 (ref 9–20)
CALCIUM SERPL-MCNC: 8.9 MG/DL (ref 8.6–10.4)
CHLORIDE BLD-SCNC: 102 MMOL/L (ref 98–107)
CO2: 27 MMOL/L (ref 20–31)
CREAT SERPL-MCNC: 0.72 MG/DL (ref 0.7–1.2)
GFR AFRICAN AMERICAN: >60 ML/MIN
GFR NON-AFRICAN AMERICAN: >60 ML/MIN
GFR SERPL CREATININE-BSD FRML MDRD: ABNORMAL ML/MIN/{1.73_M2}
GFR SERPL CREATININE-BSD FRML MDRD: ABNORMAL ML/MIN/{1.73_M2}
GLUCOSE BLD-MCNC: 137 MG/DL (ref 75–110)
GLUCOSE BLD-MCNC: 141 MG/DL (ref 75–110)
GLUCOSE BLD-MCNC: 187 MG/DL (ref 70–99)
GLUCOSE BLD-MCNC: 215 MG/DL (ref 75–110)
GLUCOSE BLD-MCNC: 58 MG/DL (ref 75–110)
GLUCOSE BLD-MCNC: 80 MG/DL (ref 75–110)
HCT VFR BLD CALC: 31.3 % (ref 40.7–50.3)
HEMOGLOBIN: 9 G/DL (ref 13–17)
MCH RBC QN AUTO: 26.2 PG (ref 25.2–33.5)
MCHC RBC AUTO-ENTMCNC: 28.8 G/DL (ref 28.4–34.8)
MCV RBC AUTO: 91 FL (ref 82.6–102.9)
NRBC AUTOMATED: 0 PER 100 WBC
PDW BLD-RTO: 15.5 % (ref 11.8–14.4)
PLATELET # BLD: 532 K/UL (ref 138–453)
PMV BLD AUTO: 8.6 FL (ref 8.1–13.5)
POTASSIUM SERPL-SCNC: 4.5 MMOL/L (ref 3.7–5.3)
RBC # BLD: 3.44 M/UL (ref 4.21–5.77)
SODIUM BLD-SCNC: 137 MMOL/L (ref 135–144)
WBC # BLD: 11.2 K/UL (ref 3.5–11.3)

## 2021-01-10 PROCEDURE — 6360000002 HC RX W HCPCS: Performed by: STUDENT IN AN ORGANIZED HEALTH CARE EDUCATION/TRAINING PROGRAM

## 2021-01-10 PROCEDURE — 1200000000 HC SEMI PRIVATE

## 2021-01-10 PROCEDURE — 99232 SBSQ HOSP IP/OBS MODERATE 35: CPT | Performed by: INTERNAL MEDICINE

## 2021-01-10 PROCEDURE — 2580000003 HC RX 258: Performed by: STUDENT IN AN ORGANIZED HEALTH CARE EDUCATION/TRAINING PROGRAM

## 2021-01-10 PROCEDURE — 94761 N-INVAS EAR/PLS OXIMETRY MLT: CPT

## 2021-01-10 PROCEDURE — 97110 THERAPEUTIC EXERCISES: CPT

## 2021-01-10 PROCEDURE — 94640 AIRWAY INHALATION TREATMENT: CPT

## 2021-01-10 PROCEDURE — 6370000000 HC RX 637 (ALT 250 FOR IP): Performed by: STUDENT IN AN ORGANIZED HEALTH CARE EDUCATION/TRAINING PROGRAM

## 2021-01-10 PROCEDURE — 80048 BASIC METABOLIC PNL TOTAL CA: CPT

## 2021-01-10 PROCEDURE — 82947 ASSAY GLUCOSE BLOOD QUANT: CPT

## 2021-01-10 PROCEDURE — 6370000000 HC RX 637 (ALT 250 FOR IP): Performed by: INTERNAL MEDICINE

## 2021-01-10 PROCEDURE — 85027 COMPLETE CBC AUTOMATED: CPT

## 2021-01-10 PROCEDURE — 36415 COLL VENOUS BLD VENIPUNCTURE: CPT

## 2021-01-10 RX ORDER — INSULIN GLARGINE 100 [IU]/ML
25 INJECTION, SOLUTION SUBCUTANEOUS
Status: DISCONTINUED | OUTPATIENT
Start: 2021-01-10 | End: 2021-01-11 | Stop reason: HOSPADM

## 2021-01-10 RX ORDER — INSULIN GLARGINE 100 [IU]/ML
35 INJECTION, SOLUTION SUBCUTANEOUS
Status: DISCONTINUED | OUTPATIENT
Start: 2021-01-11 | End: 2021-01-11 | Stop reason: HOSPADM

## 2021-01-10 RX ADMIN — DAKIN'S SOLUTION 0.125% (QUARTER STRENGTH): 0.12 SOLUTION at 08:43

## 2021-01-10 RX ADMIN — POLYMYXIN B SULFATE, BACITRACIN ZINC, NEOMYCIN SULFATE: 5000; 3.5; 4 OINTMENT TOPICAL at 08:34

## 2021-01-10 RX ADMIN — Medication 10 ML: at 13:53

## 2021-01-10 RX ADMIN — MORPHINE SULFATE 4 MG: 4 INJECTION, SOLUTION INTRAMUSCULAR; INTRAVENOUS at 06:05

## 2021-01-10 RX ADMIN — POLYMYXIN B SULFATE, BACITRACIN ZINC, NEOMYCIN SULFATE: 5000; 3.5; 4 OINTMENT TOPICAL at 21:20

## 2021-01-10 RX ADMIN — INSULIN LISPRO 6 UNITS: 100 INJECTION, SOLUTION INTRAVENOUS; SUBCUTANEOUS at 08:32

## 2021-01-10 RX ADMIN — SODIUM CHLORIDE, PRESERVATIVE FREE 10 ML: 5 INJECTION INTRAVENOUS at 08:35

## 2021-01-10 RX ADMIN — INSULIN LISPRO 2 UNITS: 100 INJECTION, SOLUTION INTRAVENOUS; SUBCUTANEOUS at 21:21

## 2021-01-10 RX ADMIN — METFORMIN HYDROCHLORIDE 500 MG: 500 TABLET ORAL at 16:59

## 2021-01-10 RX ADMIN — BUDESONIDE AND FORMOTEROL FUMARATE DIHYDRATE 2 PUFF: 160; 4.5 AEROSOL RESPIRATORY (INHALATION) at 19:49

## 2021-01-10 RX ADMIN — APIXABAN 5 MG: 5 TABLET, FILM COATED ORAL at 08:32

## 2021-01-10 RX ADMIN — OXYCODONE AND ACETAMINOPHEN 2 TABLET: 5; 325 TABLET ORAL at 18:23

## 2021-01-10 RX ADMIN — OXYCODONE AND ACETAMINOPHEN 2 TABLET: 5; 325 TABLET ORAL at 11:51

## 2021-01-10 RX ADMIN — GUAIFENESIN 600 MG: 600 TABLET, EXTENDED RELEASE ORAL at 21:20

## 2021-01-10 RX ADMIN — APIXABAN 5 MG: 5 TABLET, FILM COATED ORAL at 21:20

## 2021-01-10 RX ADMIN — INSULIN GLARGINE 35 UNITS: 100 INJECTION, SOLUTION SUBCUTANEOUS at 08:33

## 2021-01-10 RX ADMIN — METFORMIN HYDROCHLORIDE 500 MG: 500 TABLET ORAL at 08:32

## 2021-01-10 RX ADMIN — OXYCODONE AND ACETAMINOPHEN 2 TABLET: 5; 325 TABLET ORAL at 04:51

## 2021-01-10 RX ADMIN — BUDESONIDE AND FORMOTEROL FUMARATE DIHYDRATE 2 PUFF: 160; 4.5 AEROSOL RESPIRATORY (INHALATION) at 09:27

## 2021-01-10 RX ADMIN — MORPHINE SULFATE 4 MG: 4 INJECTION, SOLUTION INTRAMUSCULAR; INTRAVENOUS at 21:20

## 2021-01-10 RX ADMIN — SODIUM CHLORIDE, PRESERVATIVE FREE 10 ML: 5 INJECTION INTRAVENOUS at 21:21

## 2021-01-10 RX ADMIN — CEFTRIAXONE 2 G: 2 INJECTION, POWDER, FOR SOLUTION INTRAMUSCULAR; INTRAVENOUS at 08:34

## 2021-01-10 RX ADMIN — MORPHINE SULFATE 4 MG: 4 INJECTION, SOLUTION INTRAMUSCULAR; INTRAVENOUS at 13:52

## 2021-01-10 RX ADMIN — ALUMINUM HYDROXIDE, MAGNESIUM HYDROXIDE, AND SIMETHICONE 30 ML: 200; 200; 20 SUSPENSION ORAL at 22:40

## 2021-01-10 RX ADMIN — INSULIN GLARGINE 25 UNITS: 100 INJECTION, SOLUTION SUBCUTANEOUS at 21:20

## 2021-01-10 RX ADMIN — NORTRIPTYLINE HYDROCHLORIDE 50 MG: 25 CAPSULE ORAL at 21:20

## 2021-01-10 RX ADMIN — PANTOPRAZOLE SODIUM 40 MG: 40 TABLET, DELAYED RELEASE ORAL at 06:05

## 2021-01-10 RX ADMIN — GUAIFENESIN 600 MG: 600 TABLET, EXTENDED RELEASE ORAL at 08:32

## 2021-01-10 ASSESSMENT — PAIN - FUNCTIONAL ASSESSMENT
PAIN_FUNCTIONAL_ASSESSMENT: PREVENTS OR INTERFERES SOME ACTIVE ACTIVITIES AND ADLS

## 2021-01-10 ASSESSMENT — PAIN DESCRIPTION - LOCATION
LOCATION: FOOT

## 2021-01-10 ASSESSMENT — PAIN DESCRIPTION - ORIENTATION
ORIENTATION: LEFT

## 2021-01-10 ASSESSMENT — PAIN DESCRIPTION - PAIN TYPE
TYPE: ACUTE PAIN

## 2021-01-10 ASSESSMENT — PAIN DESCRIPTION - DESCRIPTORS
DESCRIPTORS: CONSTANT
DESCRIPTORS: CONSTANT

## 2021-01-10 ASSESSMENT — PAIN SCALES - GENERAL
PAINLEVEL_OUTOF10: 3
PAINLEVEL_OUTOF10: 7
PAINLEVEL_OUTOF10: 4
PAINLEVEL_OUTOF10: 7
PAINLEVEL_OUTOF10: 7

## 2021-01-10 ASSESSMENT — PAIN DESCRIPTION - PROGRESSION
CLINICAL_PROGRESSION: GRADUALLY WORSENING

## 2021-01-10 ASSESSMENT — PAIN DESCRIPTION - ONSET
ONSET: ON-GOING

## 2021-01-10 ASSESSMENT — ENCOUNTER SYMPTOMS
RESPIRATORY NEGATIVE: 1
ALLERGIC/IMMUNOLOGIC NEGATIVE: 1
GASTROINTESTINAL NEGATIVE: 1

## 2021-01-10 ASSESSMENT — PAIN DESCRIPTION - FREQUENCY
FREQUENCY: CONTINUOUS
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
## 2021-01-10 NOTE — PLAN OF CARE
Problem: Discharge Planning:  Goal: Discharged to appropriate level of care  Description: Discharged to appropriate level of care  1/9/2021 2340 by Hayley Corona RN  Outcome: Ongoing  1/9/2021 1008 by Creta Dakin, RN  Outcome: Ongoing     Problem: Serum Glucose Level - Abnormal:  Goal: Ability to maintain appropriate glucose levels will improve  Description: Ability to maintain appropriate glucose levels will improve  1/9/2021 2340 by Hayley Corona RN  Outcome: Ongoing  1/9/2021 1008 by Creta Dakin, RN  Outcome: Ongoing     Problem: Sensory Perception - Impaired:  Goal: Ability to maintain a stable neurologic state will improve  Description: Ability to maintain a stable neurologic state will improve  1/9/2021 1008 by Creta Dakin, RN  Outcome: Ongoing     Problem: SAFETY  Goal: Free from accidental physical injury  1/9/2021 1008 by Creta Dakin, RN  Outcome: Ongoing  Goal: Free from intentional harm  1/9/2021 1008 by Creta Dakin, RN  Outcome: Ongoing     Problem: DAILY CARE  Goal: Daily care needs are met  1/9/2021 1008 by Creta Dakin, RN  Outcome: Ongoing     Problem: PAIN  Goal: Patient's pain/discomfort is manageable  1/9/2021 2340 by Hayley Corona RN  Outcome: Ongoing  1/9/2021 1008 by Creta Dakin, RN  Outcome: Ongoing     Problem: SKIN INTEGRITY  Goal: Skin integrity is maintained or improved  1/9/2021 2340 by Hayley Corona RN  Outcome: Ongoing  1/9/2021 1008 by Creta Dakin, RN  Outcome: Ongoing     Problem: KNOWLEDGE DEFICIT  Goal: Patient/S.O. demonstrates understanding of disease process, treatment plan, medications, and discharge instructions.   1/9/2021 2340 by Hayley Corona RN  Outcome: Ongoing  1/9/2021 1008 by Creta Dakin, RN  Outcome: Ongoing     Problem: DISCHARGE BARRIERS  Goal: Patient's continuum of care needs are met  1/9/2021 1008 by Creta Dakin, RN  Outcome: Ongoing     Problem: Skin Integrity:  Goal: Absence of new skin breakdown  Description: Absence of new skin breakdown  1/9/2021 2340 by Ralph Bryan RN  Outcome: Ongoing  1/9/2021 1008 by Hansel Bell RN  Outcome: Ongoing     Problem: Nutrition  Goal: Optimal nutrition therapy  1/9/2021 2340 by Ralph Bryan RN  Outcome: Ongoing  1/9/2021 1008 by Hansel Bell RN  Outcome: Ongoing     Problem: Pain:  Goal: Pain level will decrease  Description: Pain level will decrease  1/9/2021 2340 by Ralph Bryan RN  Outcome: Ongoing  1/9/2021 1008 by Hansel Bell RN  Outcome: Ongoing  Note: Pain level assessment complete.    Patient educated on pain scale and control interventions  PRN pain medication given per patient request  Patient instructed to call out with new onset of pain or unrelieved pain    Goal: Control of acute pain  Description: Control of acute pain  1/9/2021 1008 by Hansel Bell RN  Outcome: Ongoing  Goal: Control of chronic pain  Description: Control of chronic pain  1/9/2021 1008 by Hansel Bell RN  Outcome: Ongoing

## 2021-01-10 NOTE — PLAN OF CARE
chronic pain  Description: Control of chronic pain  Outcome: Ongoing     Problem: IP BALANCE  Goal: LTG - Patient will maintain balance to allow for safe/functional mobility  Outcome: Ongoing  Goal: BALANCE EDUCATION  Description: Educate patients on maintaining dynamic/static standing/sitting balance, with/without upper extremity support.   Outcome: Ongoing     Problem: IP BATHING  Goal: LTG - Patient will utilize adaptive techniques to bathe body  Outcome: Ongoing  Goal: STG - patient will bathe body  Outcome: Ongoing     Problem: IP DRESSINGS LOWER EXTREMITIES  Goal: LTG - patient will dress lower body with or without assistive device  Outcome: Ongoing  Goal: STG - Patient will dress lower body from a seated position  Outcome: Ongoing  Goal: STG - Patient will use proper technique to retrieve clothing  Outcome: Ongoing     Problem: IP DRESSING UPPER EXTREMITIES  Goal: LTG - Patient will dress upper body from seated position  Outcome: Ongoing  Goal: STG - patient will dress upper body with or without assistive device  Outcome: Ongoing     Problem: IP GROOMING  Goal: LTG - patient will complete daily grooming tasks  Outcome: Ongoing  Goal: STG - patient completes grooming  Outcome: Ongoing  Goal: STG - PATIENT WILL DEMONSTRATE APPROPRIATE GROOMING ACTIVITIES IN APPROPRIATE POSITION  Outcome: Ongoing     Problem: IP MOBILITY  Goal: LTG - patient will demonstrate safe mobility requirements  Outcome: Ongoing  Goal: STG - Patient will ambulate  Outcome: Ongoing     Problem: IP TOILETING  Goal: LTG - patient will demonstrate use of appropriate intervention for safe toileting  Outcome: Ongoing  Goal: STG - Patient will complete toileting tasks with  Outcome: Ongoing     Problem: IP TRANSFERS  Goal: LTG - Patient will demonstrate safe transfer techniques  Outcome: Ongoing  Goal: LTG - Patient will transfer to tub/shower  Outcome: Ongoing  Goal: STG - patient will perform toilet transfer  Outcome: Ongoing  Goal: STG -

## 2021-01-10 NOTE — PROGRESS NOTES
VASCULAR SURGERY  PROGRESS NOTE      1/9/2021 8:27 PM  Subjective:   Admit Date: 12/31/2020  PCP: Laurie Camacho DO    Chief Complaint   Patient presents with    Foot Pain     L side     Interval History: No complaints. Awaiting placement. Diet: DIET CARB CONTROL; Carb Control: 3 carb choices (45 gms)/meal  Dietary Nutrition Supplements: Diabetic Oral Supplement    Medications:   Scheduled Meds:   pantoprazole  40 mg Oral QAM AC    insulin glargine  35 Units Subcutaneous BID    budesonide-formoterol  2 puff Inhalation BID    guaiFENesin  600 mg Oral BID    cefTRIAXone (ROCEPHIN) IV  2 g Intravenous Q24H    sodium hypochlorite   Irrigation Daily    neomycin-bacitracin-polymyxin   Topical BID    apixaban  5 mg Oral BID    metFORMIN  500 mg Oral BID WC    nortriptyline  50 mg Oral Nightly    sodium chloride flush  10 mL Intravenous 2 times per day    insulin lispro  0-18 Units Subcutaneous TID WC    insulin lispro  0-9 Units Subcutaneous Nightly     Continuous Infusions:   dextrose Stopped (01/05/21 1753)         Labs:   CBC:   Recent Labs     01/07/21  0619 01/08/21  0611 01/09/21  0602   WBC 10.4 9.6 10.4   HGB 8.6* 9.1* 8.6*   * 544* 534*     BMP:    Recent Labs     01/07/21  0619 01/08/21  0611 01/09/21  0602    141 140   K 4.0 4.1 4.6    105 104   CO2 25 27 28   BUN 16 16 21   CREATININE 0.90 0.77 1.00   GLUCOSE 126* 139* 210*     Hepatic: No results for input(s): AST, ALT, ALB, BILITOT, ALKPHOS in the last 72 hours. Troponin: Invalid input(s): TROPONIN  BNP: No results for input(s): BNP in the last 72 hours. Lipids: No results for input(s): CHOL, HDL in the last 72 hours. Invalid input(s): LDLCALCU  INR: No results for input(s): INR in the last 72 hours.     Objective:   Vitals: /73   Pulse 105   Temp 98.6 °F (37 °C) (Oral)   Resp 16   Ht 6' 1\" (1.854 m)   Wt 153 lb 1.6 oz (69.4 kg)   SpO2 96%   BMI 20.20 kg/m²   General appearance: alert, cooperative and no distress  Mental Status: oriented to person, place and time with normal affect  Neck: good carotid pulses, no JVD  Lungs: clear to auscultation bilaterally, normal effort  Heart: regular rate and rhythm, no murmur,  Abdomen: soft, non-tender, non-distended, bowel sounds present all four quadrants, no masses, hepatomegaly, splenomegaly or aortic enlargement  Extremities: peripheral pulses by Doppler, right BKA site healed, left TMA with open ulcer, no erythema, positive bleeding at skin edges      Assessment:   3 28-year-old male with left TMA ulcer/foot infection - improving  2. PAD  3. Diabetes mellitus  4.  Noncompliance      Patient Active Problem List:     Type 2 diabetes mellitus with diabetic polyneuropathy, with long-term current use of insulin (HCC)     Neuropathic pain, leg     Diabetic polyneuropathy (HCC)     Allergic rhinitis     Tobacco dependence     Edentulous     Dysphagia     Lung nodules     Esophageal cancer (HCC)     Fracture of humerus, left, closed     Closed fracture of humerus     DM type 2 with diabetic peripheral neuropathy (HCC)     Chronic obstructive pulmonary disease (HCC)     HLD (hyperlipidemia)     Gastroesophageal reflux disease     Chronic pain syndrome     Marijuana use     History of colon polyps     Cellulitis of right heel     Functional diarrhea     Sepsis due to methicillin resistant Staphylococcus aureus (MRSA) (HCC)     History of esophageal cancer     Osteomyelitis, chronic, ankle or foot ([address]     Peripheral artery disease ([address]     Other pulmonary embolism without acute cor pulmonale (HCC)     Carotid stenosis, asymptomatic, bilateral     Lower limb amputation status     Fx humeral neck, right, closed, initial encounter     Transient hypotension     Constipation due to opioid therapy     Acute kidney injury ([address]     Diabetic ulcer of left midfoot associated with type 2 diabetes mellitus, with fat layer exposed ([address]     Complication of below knee amputation stump ([address]     COPD exacerbation (HCC)     Hyponatremia     Pain, phantom limb ([address]     Below-knee amputation of right lower extremity (HCC)     Moderate protein malnutrition ([address]     Essential hypertension     Uncontrolled type 2 diabetes mellitus with hyperglycemia ([address]     History of pulmonary embolism - 2017     Atelectasis     Uncontrolled type 2 diabetes mellitus with foot ulcer (HCC)     Azotemia     Anemia, normocytic normochromic     Osteomyelitis of fourth phalange of left foot (HCC)     Drug-induced constipation     Osteomyelitis (HCC)     Diabetic foot infection ([address]     Noncompliance     Type 1 diabetes mellitus with diabetic foot infection ([address]     Acute osteomyelitis of left foot (HCC)     Cellulitis of left foot     Mild malnutrition (HCC)     Diabetic infection of left foot (HCC)     Hypocalcemia     Hypokalemia      Plan:   1. Continue ABX and dressing changes  2. Smoking cessation  3. Hyperbaric oxygen therapy  4. Patient understands strict blood sugar control as necessary for limb salvage  5.    Awaiting placement    Electronically signed by Yamileth Gonzalez MD on 1/9/2021 at 8:27 PM

## 2021-01-10 NOTE — PROGRESS NOTES
[facility]  Progress Note    Jackson Thomas  MRN: [id number]  Date: 1/10/2021  LOS: 10     Reason for F/U :   Diabetic foot infection/osteomyelitis    Impression :   1. Left transmetatarsal amputation stump infection with underlying osteomyelitis of the first and fifth metatarsals concern for abscess  · Status post incision and drainage of the left foot with removal of nonviable bone and soft tissue  · Wound culture with group B streptococcus and E. Coli  · 1/5/2021 was found to have multiple abscesses tracking along the tendons and the plantar aspect of the calcaneus in the OR and underwent debridement with removal of nonviable soft tissue and bone. · BKA recommended  2. Leukocytosis secondary to above  3. Peripheral arterial disease  4. 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    Recommendations:   · Continue Rocephin   · PICC line for 6 weeks of IV antibiotics  · Follow CBC, liver enzymes and renal function weekly while on IV antibiotics.   · Patient declined recommended BKA and would like to try IV antibiotics and hyperbaric treatment  · Discussed with nursing staff    Infection Control Recommendations:   Contact precautions    Discharge Planning:   Estimated Length of IV antimicrobials: 6 weeks  Patient will need Midline Catheter Insertion/ PICC line Insertion: No  Patient will need: Home IV , [place],  SNF,  LTAC: Undetermined  Patient willneed outpatient wound care: No    Medical Decision making / Summary of Stay:   Anne Cardenas is a 63y.o.-year-old male who was initially admitted on 12/31/2020.  Patient      Patient is known to our practice for multiple hospitalizations since July 2020. [facility] was initially admitted July 28, 2020 for left hallux gangrene/left foot cellulitis and plantar ulcerations.  He underwent left superficial femoral, popliteal, tibial, peroneal trunk, and posterior tibial artery atherectomy/balloon angioplasty 7/29/2020.  Patient underwent left TMA with Achilles tendon lengthening 8/5/2020. aNya Lee was discharged 8/7/2020 to rehab on doxycycline and Augmentin x1 week.     8/23/2020 patient returned to [facility] saying he never received wound care. [facility] was found to have wound dehiscence with concern for underlying osteomyelitis.  8/24/2020 he underwent incision and drainage of the left foot with revision of left foot TMA.  At that time, cultures grew VRE, Proteus mirabilis, and coagulase-negative Staphylococcus.  He was discharged on oral ciprofloxacin and linezolid through 9/25/2020 to complete a 4-week course.     11/3/2020 patient returned to the emergency department due to drainage from his foot.  Patient was found to have TMA stump infection with underlying osteomyelitis of the metatarsals, first and fifth metatarsals did probe to the bone.  At that time, we did discuss with podiatry as well as the patient that he would likely need to undergo left BKA; however, they both opted to treat patient with 6 weeks of IV antibiotics. [facility] was discharged on 11/12/2020 on IV cefepime and linezolid to an extended care facility with plans to continue through 12/15/2020.  11/19/2020 patient left skilled nursing facility AMA.  IV cefepime was stopped at that time.  Patient continued on oral Zyvox. 11/23/2020 patient saw Dr. Minal Hurd in the office and patient was instructed to complete the oral linezolid.   Patient did follow-up again in the office 12/14/2020 and wound was smaller without any overt signs of infection at that time.     12/26/2020 patient presented to [facility] due to falling and having increasing pain and redness in the left foot.  WBC 16,900, CRP 67.5.  The wound probed to the bone at that time.  It was recommended for patient to be admitted, undergo debridement, and receive IV antibiotics.  Patient refused. [facility] was prescribed doxycycline 100 mg p.o. twice a day x10 days and patient signed out [address]     Patient not removed   Neurological:      General: No focal deficit present. Mental Status: He is oriented to person, place, and time. Psychiatric:         Mood and Affect: Mood normal.         Behavior: Behavior normal.               Laboratory data:   I have independently reviewed the followinglabs:  CBC with Differential:   Recent Labs     01/09/21  0602 01/10/21  0721   WBC 10.4 11.2   HGB 8.6* 9.0*   HCT 29.8* 31.3*   * 532*     BMP:   Recent Labs     01/09/21  0602 01/10/21  0721    137   K 4.6 4.5    102   CO2 28 27   BUN 21 22   CREATININE 1.00 0.72     Hepatic Function Panel: No results for input(s): PROT, LABALBU, BILIDIR, IBILI, BILITOT, ALKPHOS, ALT, AST in the last 72 hours. No results found for: PROCAL  Lab Results   Component Value Date    CRP 46.6 01/07/2021    .8 01/02/2021    CRP 60.2 12/31/2020     Lab Results   Component Value Date    SEDRATE 70 (H) 12/31/2020         Lab Results   Component Value Date    DDIMER 0.39 06/29/2020    DDIMER 1.63 12/20/2017    DDIMER 0.68 12/25/2011     Lab Results   Component Value Date    FERRITIN 64 01/25/2014     No results found for: LDH  No results found for: FIBRINOGEN    Results in Past 30 Days  Result Component Current Result Ref Range Previous Result Ref Range   SARS-CoV-2      (12/31/2020)  Not in Time Range          (12/31/2020)       Not Detected (12/31/2020) Not Detected       Lab Results   Component Value Date    COVID19 Not Detected 12/31/2020    COVID19 Not Detected 11/10/2020    COVID19 Not Detected 08/23/2020    COVID19 Not Detected 07/29/2020       No results for input(s): VANCOTROUGH in the last 72 hours.     Imaging Studies:   MRI OF THE LEFT FOOT WITH AND WITHOUT CONTRAST, 1/4/2021 7:51 am  Impression   Acute osteomyelitis of the remaining 5th metatarsal.       Patchy bone marrow edema is seen in the remaining 1st-4th metatarsals as well   as the cuboid without definite marrow replacement.  This could represent reactive bone marrow edema versus early acute osteomyelitis.       Nonenhancing 1.5 x 0.6 x 2.2 cm T2 hyperintense lesion at the plantar aspect   of the 4th metatarsal suspicious for an abscess.       Nonspecific generalized plantar muscle edema. Cultures:     Culture, Blood 1 [[id number]] Collected: 12/31/20 1502   Order Status: Completed Specimen: Blood Updated: 01/06/21 0012    Specimen Description . BLOOD    Special Requests 8ML LAC    Culture NO GROWTH 6 DAYS   Culture, Blood 1 [[id number]] Collected: 12/31/20 0745   Order Status: Completed Specimen: Blood Updated: 01/06/21 0012    Specimen Description . BLOOD    Special Requests 11ML LINE DRAW    Culture NO GROWTH 6 DAYS   Culture, Blood 1 [[id number]] Collected: 12/31/20 0753   Order Status: Completed Specimen: Blood Updated: 01/06/21 0012    Specimen Description . BLOOD    Special Requests 8ML LEFT AC    Culture NO GROWTH 6 DAYS       Culture, Anaerobic and Aerobic [[id number]] (Abnormal)  Collected: 12/31/20 1907   Order Status: Completed Specimen: Foot Updated: 01/03/21 1655    Specimen Description . FOOT LEFT    Special Requests NOT REPORTED    Direct Exam NO NEUTROPHILS SEEN     RARE GRAM POSITIVE COCCI IN PAIRSAbnormal     Culture STREPTOCOCCI, BETA HEMOLYTIC GROUP B LIGHT GROWTHAbnormal      ESCHERICHIA COLI LIGHT GROWTHAbnormal      NO ANAEROBIC ORGANISMS ISOLATED AT 3 DAYSAbnormal    Escherichia coli (2)    Antibiotic Interpretation DARIAN Status    amikacin   Preliminary     NOT REPORTED    ampicillin Sensitive  Preliminary     4   SUSCEPTIBLE   ampicillin-sulbactam   Preliminary     NOT REPORTED    aztreonam Sensitive  Preliminary     <=1   SUSCEPTIBLE   ceFAZolin Sensitive  Preliminary     <=4   SUSCEPTIBLE   cefepime   Preliminary     NOT REPORTED    cefTRIAXone Sensitive  Preliminary     <=1   SUSCEPTIBLE   ciprofloxacin Sensitive  Preliminary     <=0.25   SUSCEPTIBLE   ertapenem   Preliminary     NOT REPORTED    Confirmatory Extended Spectrum Beta-Lactamase Negative NEGATIVE Preliminary    gentamicin Sensitive  Preliminary     <=1   SUSCEPTIBLE   meropenem   Preliminary     NOT REPORTED    nitrofurantoin   Preliminary     NOT REPORTED    tigecycline   Preliminary     NOT REPORTED    tobramycin Sensitive  Preliminary     <=1   SUSCEPTIBLE   trimethoprim-sulfamethoxazole Sensitive  Preliminary     <=20   SUSCEPTIBLE   piperacillin-tazobactam Sensitive  Preliminary     <=4   SUSCEPTIBLE       Culture, Wound [[id number]] (Abnormal) Collected: 12/31/20 1240   Order Status: Completed Specimen: Foot Updated: 01/02/21 0932    Specimen Description . FOOT SWAB    Special Requests NOT REPORTED    Direct Exam RARE NEUTROPHILSAbnormal      RARE GRAM POSITIVE RODSAbnormal      RARE GRAM NEGATIVE RODSAbnormal     Culture NORMAL SKIN FLORAAbnormal      STREPTOCOCCI, BETA HEMOLYTIC GROUP B LIGHT GROWTHAbnormal      Medications:      pantoprazole  40 mg Oral QAM AC    insulin glargine  35 Units Subcutaneous BID    budesonide-formoterol  2 puff Inhalation BID    guaiFENesin  600 mg Oral BID    cefTRIAXone (ROCEPHIN) IV  2 g Intravenous Q24H    sodium hypochlorite   Irrigation Daily    neomycin-bacitracin-polymyxin   Topical BID    apixaban  5 mg Oral BID    metFORMIN  500 mg Oral BID WC    nortriptyline  50 mg Oral Nightly    sodium chloride flush  10 mL Intravenous 2 times per day    insulin lispro  0-18 Units Subcutaneous TID WC    insulin lispro  0-9 Units Subcutaneous Nightly           [facility]  Nishant Denton  [employer]  OFFICE: [phone]      Electronically signed by Nishant Denton MD on 1/10/2021 at 12:11 PM  Thank you for allowing us to participate in the care of this patient. Please call with questions.     This note iscreated with the assistance of a speech recognition program.  While intending to generate a document that actually reflects the content of the visit, the document can still have some errors including those of syntax andsound a like substitutions which may escape proof reading. In such instances, actual meaning can be extrapolated by contextual diversion.

## 2021-01-11 ENCOUNTER — APPOINTMENT (OUTPATIENT)
Dept: INTERVENTIONAL RADIOLOGY/VASCULAR | Age: 66
DRG: 464 | End: 2021-01-11
Payer: MEDICARE

## 2021-01-11 VITALS
HEART RATE: 107 BPM | DIASTOLIC BLOOD PRESSURE: 79 MMHG | HEIGHT: 73 IN | SYSTOLIC BLOOD PRESSURE: 147 MMHG | RESPIRATION RATE: 17 BRPM | TEMPERATURE: 97.6 F | WEIGHT: 150 LBS | BODY MASS INDEX: 19.88 KG/M2 | OXYGEN SATURATION: 100 %

## 2021-01-11 LAB
ANION GAP SERPL CALCULATED.3IONS-SCNC: 8 MMOL/L (ref 9–17)
BUN BLDV-MCNC: 21 MG/DL (ref 8–23)
BUN/CREAT BLD: 28 (ref 9–20)
CALCIUM SERPL-MCNC: 9.2 MG/DL (ref 8.6–10.4)
CHLORIDE BLD-SCNC: 104 MMOL/L (ref 98–107)
CO2: 27 MMOL/L (ref 20–31)
CREAT SERPL-MCNC: 0.76 MG/DL (ref 0.7–1.2)
GFR AFRICAN AMERICAN: >60 ML/MIN
GFR NON-AFRICAN AMERICAN: >60 ML/MIN
GFR SERPL CREATININE-BSD FRML MDRD: ABNORMAL ML/MIN/{1.73_M2}
GFR SERPL CREATININE-BSD FRML MDRD: ABNORMAL ML/MIN/{1.73_M2}
GLUCOSE BLD-MCNC: 135 MG/DL (ref 75–110)
GLUCOSE BLD-MCNC: 137 MG/DL (ref 75–110)
GLUCOSE BLD-MCNC: 196 MG/DL (ref 75–110)
GLUCOSE BLD-MCNC: 60 MG/DL (ref 70–99)
GLUCOSE BLD-MCNC: 60 MG/DL (ref 75–110)
GLUCOSE BLD-MCNC: 63 MG/DL (ref 75–110)
HCT VFR BLD CALC: 32.5 % (ref 40.7–50.3)
HEMOGLOBIN: 9.3 G/DL (ref 13–17)
MCH RBC QN AUTO: 25.8 PG (ref 25.2–33.5)
MCHC RBC AUTO-ENTMCNC: 28.6 G/DL (ref 28.4–34.8)
MCV RBC AUTO: 90.3 FL (ref 82.6–102.9)
NRBC AUTOMATED: 0 PER 100 WBC
PDW BLD-RTO: 15.3 % (ref 11.8–14.4)
PLATELET # BLD: 557 K/UL (ref 138–453)
PMV BLD AUTO: 8.2 FL (ref 8.1–13.5)
POTASSIUM SERPL-SCNC: 4.3 MMOL/L (ref 3.7–5.3)
RBC # BLD: 3.6 M/UL (ref 4.21–5.77)
SODIUM BLD-SCNC: 139 MMOL/L (ref 135–144)
WBC # BLD: 11 K/UL (ref 3.5–11.3)

## 2021-01-11 PROCEDURE — 6360000002 HC RX W HCPCS: Performed by: STUDENT IN AN ORGANIZED HEALTH CARE EDUCATION/TRAINING PROGRAM

## 2021-01-11 PROCEDURE — 82947 ASSAY GLUCOSE BLOOD QUANT: CPT

## 2021-01-11 PROCEDURE — 99232 SBSQ HOSP IP/OBS MODERATE 35: CPT | Performed by: INTERNAL MEDICINE

## 2021-01-11 PROCEDURE — 2580000003 HC RX 258: Performed by: STUDENT IN AN ORGANIZED HEALTH CARE EDUCATION/TRAINING PROGRAM

## 2021-01-11 PROCEDURE — 97116 GAIT TRAINING THERAPY: CPT

## 2021-01-11 PROCEDURE — C1751 CATH, INF, PER/CENT/MIDLINE: HCPCS

## 2021-01-11 PROCEDURE — 99233 SBSQ HOSP IP/OBS HIGH 50: CPT | Performed by: INTERNAL MEDICINE

## 2021-01-11 PROCEDURE — 36415 COLL VENOUS BLD VENIPUNCTURE: CPT

## 2021-01-11 PROCEDURE — 6370000000 HC RX 637 (ALT 250 FOR IP): Performed by: STUDENT IN AN ORGANIZED HEALTH CARE EDUCATION/TRAINING PROGRAM

## 2021-01-11 PROCEDURE — 80048 BASIC METABOLIC PNL TOTAL CA: CPT

## 2021-01-11 PROCEDURE — 85027 COMPLETE CBC AUTOMATED: CPT

## 2021-01-11 PROCEDURE — 02HV33Z INSERTION OF INFUSION DEVICE INTO SUPERIOR VENA CAVA, PERCUTANEOUS APPROACH: ICD-10-PCS | Performed by: INTERNAL MEDICINE

## 2021-01-11 PROCEDURE — 97535 SELF CARE MNGMENT TRAINING: CPT

## 2021-01-11 PROCEDURE — 94640 AIRWAY INHALATION TREATMENT: CPT

## 2021-01-11 PROCEDURE — 94761 N-INVAS EAR/PLS OXIMETRY MLT: CPT

## 2021-01-11 PROCEDURE — 36573 INSJ PICC RS&I 5 YR+: CPT | Performed by: RADIOLOGY

## 2021-01-11 PROCEDURE — 6370000000 HC RX 637 (ALT 250 FOR IP): Performed by: INTERNAL MEDICINE

## 2021-01-11 RX ORDER — BACITRACIN, NEOMYCIN, POLYMYXIN B 400; 3.5; 5 [USP'U]/G; MG/G; [USP'U]/G
OINTMENT TOPICAL
Qty: 1 TUBE | Refills: 0 | Status: SHIPPED | OUTPATIENT
Start: 2021-01-11 | End: 2021-01-21

## 2021-01-11 RX ORDER — IPRATROPIUM BROMIDE AND ALBUTEROL SULFATE 2.5; .5 MG/3ML; MG/3ML
3 SOLUTION RESPIRATORY (INHALATION) EVERY 4 HOURS PRN
Qty: 360 ML | Refills: 1 | Status: SHIPPED | OUTPATIENT
Start: 2021-01-11 | End: 2021-10-05 | Stop reason: SDUPTHER

## 2021-01-11 RX ORDER — GUAIFENESIN 600 MG/1
600 TABLET, EXTENDED RELEASE ORAL 2 TIMES DAILY
Qty: 30 TABLET | Refills: 0 | Status: SHIPPED | OUTPATIENT
Start: 2021-01-11 | End: 2021-01-22

## 2021-01-11 RX ORDER — PANTOPRAZOLE SODIUM 40 MG/1
40 TABLET, DELAYED RELEASE ORAL
Qty: 30 TABLET | Refills: 3 | Status: SHIPPED | OUTPATIENT
Start: 2021-01-12 | End: 2021-02-03

## 2021-01-11 RX ORDER — BUDESONIDE AND FORMOTEROL FUMARATE DIHYDRATE 160; 4.5 UG/1; UG/1
2 AEROSOL RESPIRATORY (INHALATION) 2 TIMES DAILY
Qty: 1 INHALER | Refills: 3 | Status: SHIPPED | OUTPATIENT
Start: 2021-01-11 | End: 2021-10-05 | Stop reason: SDUPTHER

## 2021-01-11 RX ORDER — INSULIN GLARGINE 100 [IU]/ML
25 INJECTION, SOLUTION SUBCUTANEOUS
Qty: 1 VIAL | Refills: 3 | Status: SHIPPED | OUTPATIENT
Start: 2021-01-11 | End: 2021-09-24 | Stop reason: SDUPTHER

## 2021-01-11 RX ADMIN — METFORMIN HYDROCHLORIDE 500 MG: 500 TABLET ORAL at 08:53

## 2021-01-11 RX ADMIN — MORPHINE SULFATE 4 MG: 4 INJECTION, SOLUTION INTRAMUSCULAR; INTRAVENOUS at 04:40

## 2021-01-11 RX ADMIN — INSULIN LISPRO 3 UNITS: 100 INJECTION, SOLUTION INTRAVENOUS; SUBCUTANEOUS at 17:15

## 2021-01-11 RX ADMIN — METFORMIN HYDROCHLORIDE 500 MG: 500 TABLET ORAL at 17:15

## 2021-01-11 RX ADMIN — PANTOPRAZOLE SODIUM 40 MG: 40 TABLET, DELAYED RELEASE ORAL at 05:40

## 2021-01-11 RX ADMIN — MORPHINE SULFATE 4 MG: 4 INJECTION, SOLUTION INTRAMUSCULAR; INTRAVENOUS at 01:45

## 2021-01-11 RX ADMIN — MORPHINE SULFATE 4 MG: 4 INJECTION, SOLUTION INTRAMUSCULAR; INTRAVENOUS at 17:23

## 2021-01-11 RX ADMIN — INSULIN GLARGINE 25 UNITS: 100 INJECTION, SOLUTION SUBCUTANEOUS at 17:15

## 2021-01-11 RX ADMIN — POLYMYXIN B SULFATE, BACITRACIN ZINC, NEOMYCIN SULFATE: 5000; 3.5; 4 OINTMENT TOPICAL at 08:53

## 2021-01-11 RX ADMIN — SODIUM CHLORIDE, PRESERVATIVE FREE 10 ML: 5 INJECTION INTRAVENOUS at 08:54

## 2021-01-11 RX ADMIN — PROBIOTIC PRODUCT - TAB 1 TABLET: TAB at 13:32

## 2021-01-11 RX ADMIN — CEFTRIAXONE 2 G: 2 INJECTION, POWDER, FOR SOLUTION INTRAMUSCULAR; INTRAVENOUS at 08:53

## 2021-01-11 RX ADMIN — BUDESONIDE AND FORMOTEROL FUMARATE DIHYDRATE 2 PUFF: 160; 4.5 AEROSOL RESPIRATORY (INHALATION) at 09:40

## 2021-01-11 RX ADMIN — OXYCODONE AND ACETAMINOPHEN 2 TABLET: 5; 325 TABLET ORAL at 16:08

## 2021-01-11 RX ADMIN — OXYCODONE AND ACETAMINOPHEN 2 TABLET: 5; 325 TABLET ORAL at 00:39

## 2021-01-11 RX ADMIN — GUAIFENESIN 600 MG: 600 TABLET, EXTENDED RELEASE ORAL at 08:53

## 2021-01-11 RX ADMIN — MORPHINE SULFATE 4 MG: 4 INJECTION, SOLUTION INTRAMUSCULAR; INTRAVENOUS at 09:28

## 2021-01-11 RX ADMIN — APIXABAN 5 MG: 5 TABLET, FILM COATED ORAL at 08:53

## 2021-01-11 RX ADMIN — ALUMINUM HYDROXIDE, MAGNESIUM HYDROXIDE, AND SIMETHICONE 30 ML: 200; 200; 20 SUSPENSION ORAL at 13:04

## 2021-01-11 RX ADMIN — Medication 10 ML: at 17:23

## 2021-01-11 RX ADMIN — OXYCODONE AND ACETAMINOPHEN 2 TABLET: 5; 325 TABLET ORAL at 06:42

## 2021-01-11 ASSESSMENT — PAIN DESCRIPTION - PAIN TYPE
TYPE: ACUTE PAIN

## 2021-01-11 ASSESSMENT — ENCOUNTER SYMPTOMS
RESPIRATORY NEGATIVE: 1
ALLERGIC/IMMUNOLOGIC NEGATIVE: 1
GASTROINTESTINAL NEGATIVE: 1

## 2021-01-11 ASSESSMENT — PAIN DESCRIPTION - ONSET
ONSET: ON-GOING

## 2021-01-11 ASSESSMENT — PAIN DESCRIPTION - LOCATION
LOCATION: FOOT
LOCATION: FOOT

## 2021-01-11 ASSESSMENT — PAIN SCALES - GENERAL
PAINLEVEL_OUTOF10: 8
PAINLEVEL_OUTOF10: 5
PAINLEVEL_OUTOF10: 5
PAINLEVEL_OUTOF10: 7
PAINLEVEL_OUTOF10: 7
PAINLEVEL_OUTOF10: 4
PAINLEVEL_OUTOF10: 8
PAINLEVEL_OUTOF10: 8

## 2021-01-11 ASSESSMENT — PAIN DESCRIPTION - FREQUENCY
FREQUENCY: CONTINUOUS
FREQUENCY: CONTINUOUS

## 2021-01-11 ASSESSMENT — PAIN DESCRIPTION - ORIENTATION
ORIENTATION: LEFT
ORIENTATION: LEFT

## 2021-01-11 ASSESSMENT — PAIN DESCRIPTION - DESCRIPTORS
DESCRIPTORS: CONSTANT
DESCRIPTORS: CONSTANT

## 2021-01-11 NOTE — PROGRESS NOTES
initially admitted July 28, 2020 for left hallux gangrene/left foot cellulitis and plantar ulcerations.  He underwent left superficial femoral, popliteal, tibial, peroneal trunk, and posterior tibial artery atherectomy/balloon angioplasty 7/29/2020.  Patient underwent left TMA with Achilles tendon lengthening 8/5/2020. Kandy Gunter was discharged 8/7/2020 to rehab on doxycycline and Augmentin x1 week.     8/23/2020 patient returned to [facility] saying he never received wound care. Velma Mandujano was found to have wound dehiscence with concern for underlying osteomyelitis.  8/24/2020 he underwent incision and drainage of the left foot with revision of left foot TMA.  At that time, cultures grew VRE, Proteus mirabilis, and coagulase-negative Staphylococcus.  He was discharged on oral ciprofloxacin and linezolid through 9/25/2020 to complete a 4-week course.     11/3/2020 patient returned to the emergency department due to drainage from his foot.  Patient was found to have TMA stump infection with underlying osteomyelitis of the metatarsals, first and fifth metatarsals did probe to the bone.  At that time, we did discuss with podiatry as well as the patient that he would likely need to undergo left BKA; however, they both opted to treat patient with 6 weeks of IV antibiotics. Velma Mandujano was discharged on 11/12/2020 on IV cefepime and linezolid to an extended care facility with plans to continue through 12/15/2020.  11/19/2020 patient left skilled nursing facility AMA.  IV cefepime was stopped at that time.  Patient continued on oral Zyvox. 11/23/2020 patient saw Dr. Avril Francis in the office and patient was instructed to complete the oral linezolid.   Patient did follow-up again in the office 12/14/2020 and wound was smaller without any overt signs of infection at that time.     12/26/2020 patient presented to [facility] due to falling and having increasing pain and redness in the left foot.  WBC 16,900, CRP 67.5.  The wound probed to the bone at that time.  It was recommended for patient to be admitted, undergo debridement, and receive IV antibiotics.  Patient refused. [facility] was prescribed doxycycline 100 mg p.o. twice a day x10 days and patient signed out  [address]     Patient returned to the emergency department today due to pain in the left foot. [facility] does tell me that he attempted to go to his podiatrist at the beginning of the week but could not get in. [address] arrival to the emergency department, it was noted that he had the same dressing on his foot that was placed in the emergency department last week. [facility] did rate pain 7 out of 10, throbbing and constant.  He has not had any documented fevers but does have hot and cold flashes.  Patient does not really have any other complaints aside from wanting pain medication.     Patient has been started on vancomycin and Zosyn.  We have been consulted to assist with antimicrobial therapy management.     2021 incision and drainage of the left foot with removal of nonviable bone and soft tissue    Current evaluation:2021    /73   Pulse 98   Temp 97.3 °F (36.3 °C) (Axillary)   Resp 17   Ht 6' 1\" (1.854 m)   Wt 150 lb (68 kg)   SpO2 98%   BMI 19.79 kg/m²     Temperature Range: Temp: 97.3 °F (36.3 °C) Temp  Av.5 °F (36.4 °C)  Min: 97.3 °F (36.3 °C)  Max: 97.9 °F (36.6 °C)  The patient is seen and evaluated at bedside and the care was discussed with the nurse. He was starting to refuse the PICC line and was talking about getting it tomorrow and we did tell him that he will need to get this line today. The plan is for potential discharge to a skilled nursing facility. The patient reports no problem getting the PICC line today and does not report any other issues. Review of Systems   Constitutional: Negative. Respiratory: Negative. Cardiovascular: Negative. Gastrointestinal: Negative. Genitourinary: Negative. Musculoskeletal: Negative. Skin: Positive for wound. Allergic/Immunologic: Negative. Neurological: Negative. Physical Examination :     Physical Exam  Constitutional:       Appearance: He is well-developed. HENT:      Head: Normocephalic and atraumatic. Cardiovascular:      Rate and Rhythm: Normal rate. Heart sounds: Normal heart sounds. No friction rub. No gallop. Pulmonary:      Effort: Pulmonary effort is normal.      Breath sounds: Normal breath sounds. No wheezing. Abdominal:      General: Bowel sounds are normal.      Palpations: Abdomen is soft. There is no mass. Tenderness: There is no abdominal tenderness. Lymphadenopathy:      Cervical: No cervical adenopathy. Skin:     Comments: The left foot dressing was not removed       Laboratory data:   I have independently reviewed the followinglabs:  CBC with Differential:   Recent Labs     01/10/21  0721 01/11/21  0606   WBC 11.2 11.0   HGB 9.0* 9.3*   HCT 31.3* 32.5*   * 557*     BMP:   Recent Labs     01/10/21  0721 01/11/21  0606    139   K 4.5 4.3    104   CO2 27 27   BUN 22 21   CREATININE 0.72 0.76     Hepatic Function Panel: No results for input(s): PROT, LABALBU, BILIDIR, IBILI, BILITOT, ALKPHOS, ALT, AST in the last 72 hours.       No results found for: PROCAL  Lab Results   Component Value Date    CRP 46.6 01/07/2021    .8 01/02/2021    CRP 60.2 12/31/2020     Lab Results   Component Value Date    SEDRATE 70 (H) 12/31/2020         Lab Results   Component Value Date    DDIMER 0.39 06/29/2020    DDIMER 1.63 12/20/2017    DDIMER 0.68 12/25/2011     Lab Results   Component Value Date    FERRITIN 64 01/25/2014     No results found for: LDH  No results found for: FIBRINOGEN    Results in Past 30 Days  Result Component Current Result Ref Range Previous Result Ref Range   SARS-CoV-2      (12/31/2020)  Not in Time Range          (12/31/2020)       Not Detected (12/31/2020) Not Detected       Lab Results   Component Value Date COVID19 Not Detected 12/31/2020    COVID19 Not Detected 11/10/2020    COVID19 Not Detected 08/23/2020    COVID19 Not Detected 07/29/2020       No results for input(s): VANCOTROUGH in the last 72 hours. Imaging Studies:   MRI OF THE LEFT FOOT WITH AND WITHOUT CONTRAST, 1/4/2021 7:51 am  Impression   Acute osteomyelitis of the remaining 5th metatarsal.       Patchy bone marrow edema is seen in the remaining 1st-4th metatarsals as well   as the cuboid without definite marrow replacement.  This could represent   reactive bone marrow edema versus early acute osteomyelitis.       Nonenhancing 1.5 x 0.6 x 2.2 cm T2 hyperintense lesion at the plantar aspect   of the 4th metatarsal suspicious for an abscess.       Nonspecific generalized plantar muscle edema. Cultures:     Culture, Blood 1 [[id number]] Collected: 12/31/20 1502   Order Status: Completed Specimen: Blood Updated: 01/06/21 0012    Specimen Description . BLOOD    Special Requests 8ML LAC    Culture NO GROWTH 6 DAYS   Culture, Blood 1 [[id number]] Collected: 12/31/20 0745   Order Status: Completed Specimen: Blood Updated: 01/06/21 0012    Specimen Description . BLOOD    Special Requests 11ML LINE DRAW    Culture NO GROWTH 6 DAYS   Culture, Blood 1 [[id number]] Collected: 12/31/20 0753   Order Status: Completed Specimen: Blood Updated: 01/06/21 0012    Specimen Description . BLOOD    Special Requests 8ML LEFT AC    Culture NO GROWTH 6 DAYS       Culture, Anaerobic and Aerobic [[id number]] (Abnormal)  Collected: 12/31/20 1907   Order Status: Completed Specimen: Foot Updated: 01/03/21 1655    Specimen Description . FOOT LEFT    Special Requests NOT REPORTED    Direct Exam NO NEUTROPHILS SEEN     RARE GRAM POSITIVE COCCI IN PAIRSAbnormal     Culture STREPTOCOCCI, BETA HEMOLYTIC GROUP B LIGHT GROWTHAbnormal      ESCHERICHIA COLI LIGHT GROWTHAbnormal      NO ANAEROBIC ORGANISMS ISOLATED AT 3 DAYSAbnormal    Escherichia coli (2)    Antibiotic Interpretation DARIAN Status    amikacin   Preliminary     NOT REPORTED    ampicillin Sensitive  Preliminary     4   SUSCEPTIBLE   ampicillin-sulbactam   Preliminary     NOT REPORTED    aztreonam Sensitive  Preliminary     <=1   SUSCEPTIBLE   ceFAZolin Sensitive  Preliminary     <=4   SUSCEPTIBLE   cefepime   Preliminary     NOT REPORTED    cefTRIAXone Sensitive  Preliminary     <=1   SUSCEPTIBLE   ciprofloxacin Sensitive  Preliminary     <=0.25   SUSCEPTIBLE   ertapenem   Preliminary     NOT REPORTED    Confirmatory Extended Spectrum Beta-Lactamase Negative NEGATIVE Preliminary    gentamicin Sensitive  Preliminary     <=1   SUSCEPTIBLE   meropenem   Preliminary     NOT REPORTED    nitrofurantoin   Preliminary     NOT REPORTED    tigecycline   Preliminary     NOT REPORTED    tobramycin Sensitive  Preliminary     <=1   SUSCEPTIBLE   trimethoprim-sulfamethoxazole Sensitive  Preliminary     <=20   SUSCEPTIBLE   piperacillin-tazobactam Sensitive  Preliminary     <=4   SUSCEPTIBLE       Culture, Wound [[id number]] (Abnormal) Collected: 12/31/20 1240   Order Status: Completed Specimen: Foot Updated: 01/02/21 0940    Specimen Description . FOOT SWAB    Special Requests NOT REPORTED    Direct Exam RARE NEUTROPHILSAbnormal      RARE GRAM POSITIVE RODSAbnormal      RARE GRAM NEGATIVE RODSAbnormal     Culture NORMAL SKIN FLORAAbnormal      STREPTOCOCCI, BETA HEMOLYTIC GROUP B LIGHT GROWTHAbnormal      Medications:      insulin glargine  35 Units Subcutaneous Daily with breakfast    insulin glargine  25 Units Subcutaneous Dinner    pantoprazole  40 mg Oral QAM AC    budesonide-formoterol  2 puff Inhalation BID    guaiFENesin  600 mg Oral BID    cefTRIAXone (ROCEPHIN) IV  2 g Intravenous Q24H    sodium hypochlorite   Irrigation Daily    neomycin-bacitracin-polymyxin   Topical BID    apixaban  5 mg Oral BID    metFORMIN  500 mg Oral BID WC    nortriptyline  50 mg Oral Nightly    sodium chloride flush  10 mL Intravenous 2 times per day    insulin lispro  0-18 Units Subcutaneous TID WC    insulin lispro  0-9 Units Subcutaneous Nightly           [facility]  [address]  [employer]  OFFICE: [phone]      Electronically signed by [address], MD on 1/11/2021 at 12:10 PM  Thank you for allowing us to participate in the care of this patient. Please call with questions. This note iscreated with the assistance of a speech recognition program.  While intending to generate a document that actually reflects the content of the visit, the document can still have some errors including those of syntax andsound a like substitutions which may escape proof reading. In such instances, actual meaning can be extrapolated by contextual diversion.

## 2021-01-11 NOTE — DISCHARGE SUMMARY
[facility]  Office: Shelton Garcia, DO, Judi Bryan DO, Polo Acosta DO, Peter Chiu, DO, Janine Crews MD, Jazmin Velez MD, Krish Elkins MD, Batsheva Crocker MD, Jessica Llamas MD, Cris Henao MD, Sharma Babinski, MD, Maykel Davies MD, Farzaneh Carlisle MD, Leeroy Rea DO, Chris Workman MD, Marcia Frias MD, Vijaya Cormier, DO, Ofe Ramon MD,  Jarrett Harmon DO, Amaury Rosas MD, Alexis Box MD, Catherine Campo, [facility], [facility], [facility], Jefferson Ram, CNP, Carmen Collins, CNS, Rani Still, CNP, Roxann Carolina, CNP, Chika Spain, CNP, Elizabeth Strong, CNP, Niharika Panchal, CNP, Jonah Ratliff PA-C, Ratna Rowley, [facility], Vincent Miguel, CNP, Sally Adams, CNP, Chito Adan, CNP, Gaston Steve, CNP, Keyon Morales, [facility] [address]    Discharge Summary     Patient ID: Dariel Gallagher  :  1955   MRN: [id number]     ACCOUNT:  [de-identified]   Patient's PCP: Liliana Hamilton DO  Admit Date: 2020   Discharge Date: 2021     Length of Stay: 11  Code Status:  Prior  Admitting Physician: Erin Atkins DO  Discharge Physician: Will Polanco MD     Active Discharge Diagnoses:     Hospital Problem Lists:  Principal Problem:    Diabetic ulcer of left midfoot associated with type 2 diabetes mellitus, with fat layer exposed ([facility]  Active Problems:    DM type 2 with diabetic peripheral neuropathy (HCC)    Chronic obstructive pulmonary disease ([address]    HLD (hyperlipidemia)    Gastroesophageal reflux disease    Peripheral artery disease (HCC)    COPD exacerbation ([address]    Below-knee amputation of right lower extremity ([facility]    Essential hypertension    Uncontrolled type 2 diabetes mellitus with hyperglycemia (HCC)    Anemia, normocytic normochromic    Osteomyelitis of metatarsals of left foot ([address]    Diabetic foot infection ([address]    Diabetic infection of left foot ([address]    Hypocalcemia Hypokalemia    Moderate malnutrition ([address]  Resolved Problems:    * No resolved hospital problems. *      Admission Condition:  poor     Discharged Condition: stable    Hospital Stay:   Admitting history;  53-year-old male known to our service is readmitted with ongoing diabetic foot problems  There have been concerns of compliance  He continues to smoke  Blood sugar was over 800. Blood Hemoglobin A1C13. 4High % Estimated Avg [id number]      On 1/1/2021 the patient underwent:  PROCEDURE:   Incision and Drainage of left foot with removal of all nonviable soft tissue and bone.     PVR:   Evidence of moderate femoral popliteal tibial peroneal occlusive disease    of the left lower extremity.      MRI reveals:  Impression:  Acute osteomyelitis of the remaining 5th metatarsal.   Patchy bone marrow edema is seen in the remaining 1st-4th metatarsals as well   as the cuboid without definite marrow replacement.  This could represent   reactive bone marrow edema versus early acute osteomyelitis. Nonenhancing 1.5 x 0.6 x 2.2 cm T2 hyperintense lesion at the plantar aspect   of the 4th metatarsal suspicious for an abscess. Nonspecific generalized plantar muscle edema     Hospital Course:    Complaint of 3 loose stools today  Blood glucose better controlled-135, A1c 13.4  Chest tightness is better after starting inhaled steroids  Complains of acid reflux has improved   Was taken to the OR 1/5/2021 for incision and debridement of left foot and had multiple abscesses of left foot.   Podiatry recommending BKA for which patient not ready, wants to try IV antibiotics and hyperbaric oxygen  Already as recommended 6 weeks of IV antibiotics and is likely to get PICC line today   patient is waiting for going to SNF  Plan:         Continue Symbicort, continue Eliquis  Continue Lantus insulin to 35 units twice daily and continue insulin sliding scale  Continue antibiotics as per ID recommendations, he got PICC line  Today for prolonged IV antibiotic course   Patient does not want left BKA, wants to try antibiotics and hyperbaric oxygen  Wants to go to SNF today  Continue oral PPIs  Started bacid,  stool for C. Difficile not sent from floor,  apparently did not  meet criteria     Addendum:  Got messages to talk to his daughter who had concerns about diarrhea which patient had reported to me only today and it has already been addressed, she mentioned that second opinion from vascular was to be taken 5 days back which has not been done so far, I notified her that this is the for the first time I am hearing this and I do not see in any note in epic. She stated to nurse that Dr. Francisca Robles was going to do it, I have called the nurse and told her to check with Dr. Francisca Robles and do it accordingly if this was the plan     Time spent more than 40 minutes on all above     Secondary addendum:  Talked to Dr. Francisca Robles, the second opinion was from other podiatrist which was done contacting Dr. Karly Jean-Baptiste who had recommended to try 6 weeks of IV antibiotics and hyperbaric oxygen.   Called nurse to notify this to patient's daughter         Significant therapeutic interventions: see above notes    Significant Diagnostic Studies:   Labs / Micro:  CBC:   Lab Results   Component Value Date    WBC 13.5 01/12/2021    RBC 3.49 01/12/2021    RBC 4.32 05/02/2012    HGB 8.9 01/12/2021    HCT 30.9 01/12/2021    MCV 88.5 01/12/2021    MCH 25.5 01/12/2021    MCHC 28.8 01/12/2021    RDW 15.1 01/12/2021     01/12/2021     05/02/2012     BMP:    Lab Results   Component Value Date    GLUCOSE 84 01/12/2021    GLUCOSE 129 05/02/2012     01/12/2021    K 4.4 01/12/2021     01/12/2021    CO2 24 01/12/2021    ANIONGAP 13 01/12/2021    BUN 23 01/12/2021    CREATININE 0.70 01/12/2021    BUNCRER NOT REPORTED 01/12/2021    CALCIUM 8.7 01/12/2021    LABGLOM >60 01/12/2021    GFRAA >60 01/12/2021    GFR      01/12/2021    GFR NOT REPORTED 01/12/2021     HFP:    Lab Results Component Value Date    PROT 7.1 12/18/2020     CMP:    Lab Results   Component Value Date    GLUCOSE 84 01/12/2021    GLUCOSE 129 05/02/2012     01/12/2021    K 4.4 01/12/2021     01/12/2021    CO2 24 01/12/2021    BUN 23 01/12/2021    CREATININE 0.70 01/12/2021    ANIONGAP 13 01/12/2021    ALKPHOS 137 12/18/2020    ALT 7 12/18/2020    AST 12 12/18/2020    BILITOT 0.25 12/18/2020    LABALBU 3.6 12/18/2020    LABALBU 4.4 03/05/2012    ALBUMIN NOT REPORTED 12/18/2020    LABGLOM >60 01/12/2021    GFRAA >60 01/12/2021    GFR      01/12/2021    GFR NOT REPORTED 01/12/2021    PROT 7.1 12/18/2020    CALCIUM 8.7 01/12/2021     PT/INR:    Lab Results   Component Value Date    PROTIME 12.8 12/26/2020    PROTIME 10.5 05/02/2012    INR 1.0 12/26/2020     PTT:   Lab Results   Component Value Date    APTT 26.4 12/26/2020     FLP:    Lab Results   Component Value Date    CHOL 174 06/24/2018    TRIG 175 06/24/2018    HDL 49 06/24/2018     U/A:    Lab Results   Component Value Date    COLORU YELLOW 07/09/2018    TURBIDITY CLEAR 07/09/2018    SPECGRAV 1.010 07/09/2018    HGBUR NEGATIVE 07/09/2018    PHUR 5.0 07/09/2018    PROTEINU 1+ 07/09/2018    GLUCOSEU NEGATIVE 07/09/2018    GLUCOSEU TRACE 03/05/2012    KETUA NEGATIVE 07/09/2018    BILIRUBINUR NEGATIVE 07/09/2018    BILIRUBINUR small 07/18/2016    BILIRUBINUR NEGATIVE 03/05/2012    UROBILINOGEN Normal 07/09/2018    NITRU NEGATIVE 07/09/2018    LEUKOCYTESUR NEGATIVE 07/09/2018     TSH:    Lab Results   Component Value Date    TSH 0.93 06/24/2018        Radiology:  Xr Chest Portable    Result Date: 1/4/2021  No acute cardiopulmonary abnormality.        Consultations:    Consults:     Final Specialist Recommendations/Findings:   IP CONSULT TO HOSPITALIST  IP CONSULT TO INFECTIOUS DISEASES  IP CONSULT TO VASCULAR SURGERY      The patient was seen and examined on day of discharge and this discharge summary is in conjunction with any daily progress note from day of discharge.     Discharge plan:     Disposition:SNF    Physician Follow Up:     [facility]  [address]  [address] [facility] [address]  [address] [facility] Jordon Trejo at [address] [facility]  [address] [address]  [phone]  In 1 week  For wound re-check    Rose Jara, [address] [facility] [address]  [phone]  Go on 1/15/2021  Appt with Dr. Zina Mccullough for second opinion on 1/15/21 at 2:45pm       Requiring Further Evaluation/Follow Up POST HOSPITALIZATION/Incidental Findings: follow with ID after finishing 6 weeks of IV antibiotics    Diet: cardiac diet and diabetic diet    Activity: As tolerated    Instructions to Patient: use of hyperberic oxygen as recommended by podiatry    Discharge Medications:      Medication List      START taking these medications    budesonide-formoterol 160-4.5 MCG/ACT Aero  Commonly known as: SYMBICORT  Inhale 2 puffs into the lungs 2 times daily     cefTRIAXone  infusion  Commonly known as: ROCEPHIN  Infuse 2,000 mg intravenously every 24 hours for 14 days Compound per protocol     guaiFENesin 600 MG extended release tablet  Commonly known as: MUCINEX  Take 1 tablet by mouth 2 times daily for 15 days     insulin glargine 100 UNIT/ML injection vial  Commonly known as: LANTUS  Inject 25 Units into the skin Daily with supper     * insulin lispro 100 UNIT/ML injection vial  Commonly known as: HUMALOG  Inject 0-18 Units into the skin 3 times daily (with meals)  Replaces: insulin lispro (1 Unit Dial) 100 UNIT/ML Sopn     * insulin lispro 100 UNIT/ML injection vial  Commonly known as: HUMALOG  Inject 0-9 Units into the skin nightly     ipratropium-albuterol 0.5-2.5 (3) MG/3ML Soln nebulizer solution  Commonly known as: DUONEB  Inhale 3 mLs into the lungs every 4 hours as needed for Shortness of Breath     lactobacillus Tabs  Take 1 tablet by mouth 2 times daily     neomycin-bacitracin-polymyxin 400-5-5000 ointment  Commonly [phone] Henrietta Patel [phone]  Bernardo Dallas, [address] [place] [id number]    Phone: [phone]   budesonide-formoterol 160-4.5 MCG/ACT Aero  guaiFENesin 600 MG extended release tablet  insulin glargine 100 UNIT/ML injection vial  insulin lispro 100 UNIT/ML injection vial  insulin lispro 100 UNIT/ML injection vial  ipratropium-albuterol 0.5-2.5 (3) MG/3ML Soln nebulizer solution  lactobacillus Tabs  neomycin-bacitracin-polymyxin 400-5-5000 ointment  pantoprazole 40 MG tablet     You can get these medications from any pharmacy    Bring a paper prescription for each of these medications  cefTRIAXone  infusion  oxyCODONE-acetaminophen 5-325 MG per tablet         No discharge procedures on file. Time Spent on discharge is  35 mins in patient examination, evaluation, counseling as well as medication reconciliation, prescriptions for required medications, discharge plan and follow up. Electronically signed by   Prerna Palacios MD  1/12/2021  12:12 PM      Thank you Dr. Angie Malik DO for the opportunity to be involved in this patient's care.

## 2021-01-11 NOTE — PROGRESS NOTES
Writer clarified with Dr. Shanelle Rueda (podiatry) that patient is heel touch WB with cam boot. Added to [address].

## 2021-01-11 NOTE — PROGRESS NOTES
Occupational Therapy  DATE: 2021    NAME: Trenton Holguin  MRN: [id number]   : 1955  [facility]  Occupational Therapy Not Seen Note    Patient not available for Occupational Therapy due to:    [] Testing:    [] Hemodialysis    [] Cancelled by RN:    [x]Refusal by Patient: Patient c/o diarrhea and fatigue from not being able to sleep, requested writer come back at a later time, will check back at later time.     [] Surgery:     [] Intubation:     [] Pain Medication:    [] Sedation:     [] Spine Precautions :    [] Medical Instability:    [] Other:      JONATHAN Jaramillo/MARIN

## 2021-01-11 NOTE — PROGRESS NOTES
Writer spoke to Dr. Alejandro Trevino and Dr. Shanelle Rueda regarding patient's daughter's [facility]) requests- second opinion on LLE wounds and ongoing diarrhea. Dr. Alejandro Trevino spoke to daughter/ updated her on plan with diarrhea. Dr. Shanelle Rueda states Dr. Marlo Azar is aware of second opinion request and will see patient in clinic after discharge.

## 2021-01-11 NOTE — CARE COORDINATION
Discharge Planning:    Spoke with Debbie Hyatt at [address] at [phone] and patient has a scheduled appointment for 1pm 1/11/2021, however patient won't be discharged. Appointment has been rescheduled for Thursday 1/14/2021 at 1pm. RN updated.

## 2021-01-11 NOTE — CARE COORDINATION
Social work: Spoke with admissions/[facility] and admissions/[facility], both anticipate being able to accept patient. However, his insurance is switching to [email or web address] on 2/1 so they will need to confirm they can accept this insurance. Regarding insurance changing, SW encouraged patient or his dtr to call [facility] [employer], [place] [id number]) and ask to stay enrolled. If he switches to [place], he will have to go to [place] [place], doctors, etc. Patient also informed he will NOT be able to do hyperbaric wound treatment while at a SNF as facility becomes responsible for cost of each treatment. Patient can have consult with wound care, but will have to plan for tx after dc from rehab. [facility] is willing to assist patient with arranging wound care consult at [facility]. Phone call also received from tommy/dtr very upset that [facility] is considering discharging patient since he has ongoing diarrhea and has not had a second opinion regarding his wounds. Dtr is requesting to speak with Dr Leslie Cartagena regarding diarrhea and Dr Luis Fernando Mari regarding second opinion. Britney/RN informed of request.  Sharmin Plata also explained to dtr he cannot have hyperbaric tx while in a snf. Writer explained patient is not safe to go home alone and d/t covid she cannot travel from [place] to get him. Cintia Doddlilian also stated she will call [facility] and see if they will continue his insurance coverage through them.

## 2021-01-11 NOTE — PROGRESS NOTES
VASCULAR SURGERY  PROGRESS NOTE      1/11/2021 2:51 PM  Subjective:   Admit Date: 12/31/2020  PCP: Dennie Sobers, DO    Chief Complaint   Patient presents with    Foot Pain     L side     Interval History: No complaints. Awaiting placement. Discussed case with Dr. Kit Osgood. Diet: DIET CARB CONTROL; Carb Control: 3 carb choices (45 gms)/meal  Dietary Nutrition Supplements: Diabetic Oral Supplement    Medications:   Scheduled Meds:   lactobacillus  1 tablet Oral BID    insulin glargine  35 Units Subcutaneous Daily with breakfast    insulin glargine  25 Units Subcutaneous Dinner    pantoprazole  40 mg Oral QAM AC    budesonide-formoterol  2 puff Inhalation BID    guaiFENesin  600 mg Oral BID    cefTRIAXone (ROCEPHIN) IV  2 g Intravenous Q24H    sodium hypochlorite   Irrigation Daily    neomycin-bacitracin-polymyxin   Topical BID    apixaban  5 mg Oral BID    metFORMIN  500 mg Oral BID WC    nortriptyline  50 mg Oral Nightly    sodium chloride flush  10 mL Intravenous 2 times per day    insulin lispro  0-18 Units Subcutaneous TID WC    insulin lispro  0-9 Units Subcutaneous Nightly     Continuous Infusions:   dextrose Stopped (01/05/21 1753)         Labs:   CBC:   Recent Labs     01/09/21  0602 01/10/21  0721 01/11/21  0606   WBC 10.4 11.2 11.0   HGB 8.6* 9.0* 9.3*   * 532* 557*     BMP:    Recent Labs     01/09/21  0602 01/10/21  0721 01/11/21  0606    137 139   K 4.6 4.5 4.3    102 104   CO2 28 27 27   BUN 21 22 21   CREATININE 1.00 0.72 0.76   GLUCOSE 210* 187* 60*     Hepatic: No results for input(s): AST, ALT, ALB, BILITOT, ALKPHOS in the last 72 hours. Troponin: Invalid input(s): TROPONIN  BNP: No results for input(s): BNP in the last 72 hours. Lipids: No results for input(s): CHOL, HDL in the last 72 hours. Invalid input(s): LDLCALCU  INR: No results for input(s): INR in the last 72 hours.     Objective:   Vitals: /73   Pulse 98   Temp 97.3 °F (36.3 °C) (Axillary)   Resp 17   Ht 6' 1\" (1.854 m)   Wt 150 lb (68 kg)   SpO2 98%   BMI 19.79 kg/m²   General appearance: alert, cooperative and no distress  Mental Status: oriented to person, place and time with normal affect  Neck: good carotid pulses, no JVD  Lungs: clear to auscultation bilaterally, normal effort  Heart: regular rate and rhythm, no murmur,  Abdomen: soft, non-tender, non-distended, bowel sounds present all four quadrants, no masses, hepatomegaly, splenomegaly or aortic enlargement  Extremities: peripheral pulses by Doppler, right BKA site healed, left TMA with open ulcer, no erythema, positive bleeding at skin edges      Assessment:   3 59-year-old male with left TMA ulcer/foot infection - improving  2. PAD  3. Diabetes mellitus  4.  Noncompliance      Patient Active Problem List:     Type 2 diabetes mellitus with diabetic polyneuropathy, with long-term current use of insulin (HCC)     Neuropathic pain, leg     Diabetic polyneuropathy (HCC)     Allergic rhinitis     Tobacco dependence     Edentulous     Dysphagia     Lung nodules     Esophageal cancer (HCC)     Fracture of humerus, left, closed     Closed fracture of humerus     DM type 2 with diabetic peripheral neuropathy (HCC)     Chronic obstructive pulmonary disease (HCC)     HLD (hyperlipidemia)     Gastroesophageal reflux disease     Chronic pain syndrome     Marijuana use     History of colon polyps     Cellulitis of right heel     Functional diarrhea     Sepsis due to methicillin resistant Staphylococcus aureus (MRSA) (HCC)     History of esophageal cancer     Osteomyelitis, chronic, ankle or foot ([address]     Peripheral artery disease ([address]     Other pulmonary embolism without acute cor pulmonale (HCC)     Carotid stenosis, asymptomatic, bilateral     Lower limb amputation status     Fx humeral neck, right, closed, initial encounter     Transient hypotension     Constipation due to opioid therapy     Acute kidney injury ([address]     Diabetic ulcer of left midfoot associated with type 2 diabetes mellitus, with fat layer exposed ([address]     Complication of below knee amputation stump (HCC)     COPD exacerbation (HCC)     Hyponatremia     Pain, phantom limb (HCC)     Below-knee amputation of right lower extremity (HCC)     Moderate protein malnutrition ([address]     Essential hypertension     Uncontrolled type 2 diabetes mellitus with hyperglycemia ([address]     History of pulmonary embolism - 2017     Atelectasis     Uncontrolled type 2 diabetes mellitus with foot ulcer (HCC)     Azotemia     Anemia, normocytic normochromic     Osteomyelitis of fourth phalange of left foot (HCC)     Drug-induced constipation     Osteomyelitis (HCC)     Diabetic foot infection ([address]     Noncompliance     Type 1 diabetes mellitus with diabetic foot infection ([address]     Acute osteomyelitis of left foot (HCC)     Cellulitis of left foot     Mild malnutrition (HCC)     Diabetic infection of left foot (HCC)     Hypocalcemia     Hypokalemia      Plan:   1. Continue ABX and dressing changes  2. Smoking cessation  3. Hyperbaric oxygen therapy  4. Patient understands strict blood sugar control as necessary for limb salvage  5.    Discharge to [facility] after PICC line placed    Electronically signed by Kenyon Lara MD on 1/11/2021 at 2:51 PM
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
## 2021-01-11 NOTE — PLAN OF CARE
Problem: PAIN  Goal: Patient's pain/discomfort is manageable  Outcome: Ongoing  Note: Pain level assessment complete.    Patient educated on pain scale and control interventions  PRN pain medication given per patient request  Patient instructed to call out with new onset of pain or unrelieved pain       Problem: Skin Integrity:  Goal: Absence of new skin breakdown  Description: Absence of new skin breakdown  Outcome: Ongoing  Note: Iv atb as ordered  Daily dressing changes to LLE  Skin assessment completed and documented  Real scale updated  Up ad rose mary  Turns self

## 2021-01-11 NOTE — CARE COORDINATION
Social work: Spoke with admissions/[facility], they are able to accept. Noted patient refused CAM boot during PT/OT treatments- per snf, patient will need to use CAM boot. SW met with patient and informed him he needs to comply, patient stated the boot doesn't fit right and therefore he cannot wear it. Call to [address] he will come to [facility] to adjust boot. In another conversation patient stated he was instructed he couldn't wear the boot until the swelling went down. Britney/HA present in room and will clarify order with physician. At this time transport is arranged for 7:00PM.  Attempted to inform dtr of dc time and plan, no answer/vm left.       # for RN report: [phone]  Fax: [phone]
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
## 2021-01-11 NOTE — PLAN OF CARE
Problem: Discharge Planning:  Goal: Discharged to appropriate level of care  Description: Discharged to appropriate level of care  1/10/2021 2224 by Wyatt Elder RN  Outcome: Ongoing  1/10/2021 1448 by Dean Bledsoe RN  Outcome: Ongoing     Problem: Serum Glucose Level - Abnormal:  Goal: Ability to maintain appropriate glucose levels will improve  Description: Ability to maintain appropriate glucose levels will improve  1/10/2021 2224 by Wyatt Elder RN  Outcome: Ongoing  1/10/2021 1448 by Dean Bledsoe RN  Outcome: Ongoing     Problem: Sensory Perception - Impaired:  Goal: Ability to maintain a stable neurologic state will improve  Description: Ability to maintain a stable neurologic state will improve  1/10/2021 2224 by Wyatt Elder RN  Outcome: Ongoing  1/10/2021 1448 by Dean Bledsoe RN  Outcome: Ongoing     Problem: SAFETY  Goal: Free from accidental physical injury  1/10/2021 1448 by Daen Bledsoe RN  Outcome: Ongoing  Goal: Free from intentional harm  1/10/2021 1448 by Dean Bledsoe RN  Outcome: Ongoing     Problem: DAILY CARE  Goal: Daily care needs are met  1/10/2021 1448 by Dean Bledsoe RN  Outcome: Ongoing     Problem: PAIN  Goal: Patient's pain/discomfort is manageable  1/10/2021 2224 by Wyatt Elder RN  Outcome: Ongoing  1/10/2021 1448 by Dean Bledsoe RN  Outcome: Ongoing  Note: Pain level assessment complete. Patient educated on pain scale and control interventions  PRN pain medication given per patient request  Patient instructed to call out with new onset of pain or unrelieved pain       Problem: SKIN INTEGRITY  Goal: Skin integrity is maintained or improved  1/10/2021 2224 by Wyatt Elder RN  Outcome: Ongoing  1/10/2021 1448 by Dean Bledsoe RN  Outcome: Ongoing     Problem: KNOWLEDGE DEFICIT  Goal: Patient/S.O. demonstrates understanding of disease process, treatment plan, medications, and discharge instructions.   1/10/2021 2224 by Wyatt Elder RN  Outcome: Ongoing  1/10/2021 1448 by Rancho Arora RN  Outcome: Ongoing     Problem: DISCHARGE BARRIERS  Goal: Patient's continuum of care needs are met  1/10/2021 1448 by Rancho Arora RN  Outcome: Ongoing     Problem: Skin Integrity:  Goal: Absence of new skin breakdown  Description: Absence of new skin breakdown  1/10/2021 1448 by Rancho Arora RN  Outcome: Ongoing     Problem: Nutrition  Goal: Optimal nutrition therapy  1/10/2021 1448 by Rancho Arora RN  Outcome: Ongoing     Problem: Pain:  Goal: Pain level will decrease  Description: Pain level will decrease  1/10/2021 2224 by Deja Singh RN  Outcome: Ongoing  1/10/2021 1448 by Rancho Arora RN  Outcome: Ongoing  Goal: Control of acute pain  Description: Control of acute pain  1/10/2021 1448 by Rancho Arora RN  Outcome: Ongoing  Goal: Control of chronic pain  Description: Control of chronic pain  1/10/2021 1448 by Rancho Arora RN  Outcome: Ongoing     Problem: IP BALANCE  Goal: LTG - Patient will maintain balance to allow for safe/functional mobility  1/10/2021 1448 by Rancho Arora RN  Outcome: Ongoing  Goal: BALANCE EDUCATION  Description: Educate patients on maintaining dynamic/static standing/sitting balance, with/without upper extremity support.   1/10/2021 1448 by Rancho Arora RN  Outcome: Ongoing     Problem: IP BATHING  Goal: LTG - Patient will utilize adaptive techniques to bathe body  1/10/2021 1448 by Rancho Arora RN  Outcome: Ongoing  Goal: STG - patient will bathe body  1/10/2021 1448 by Rancho Arora RN  Outcome: Ongoing     Problem: IP DRESSINGS LOWER EXTREMITIES  Goal: LTG - patient will dress lower body with or without assistive device  1/10/2021 1448 by Rancho Arora RN  Outcome: Ongoing  Goal: STG - Patient will dress lower body from a seated position  1/10/2021 1448 by Rancho Arora RN  Outcome: Ongoing  Goal: STG - Patient will use proper technique to retrieve clothing  1/10/2021 1448 by Rancho Arora RN  Outcome: Ongoing     Problem: IP DRESSING UPPER EXTREMITIES  Goal: LTG - Patient will dress upper body from seated position  1/10/2021 1448 by Paulina Lerma RN  Outcome: Ongoing  Goal: STG - patient will dress upper body with or without assistive device  1/10/2021 1448 by Paulina Lerma RN  Outcome: Ongoing     Problem: IP GROOMING  Goal: LTG - patient will complete daily grooming tasks  1/10/2021 1448 by Paulina Lerma RN  Outcome: Ongoing  Goal: STG - patient completes grooming  1/10/2021 1448 by Paulina Lerma RN  Outcome: Ongoing  Goal: STG - PATIENT WILL DEMONSTRATE APPROPRIATE [address] IN APPROPRIATE POSITION  1/10/2021 1448 by Paulina Lerma RN  Outcome: Ongoing     Problem: IP MOBILITY  Goal: LTG - patient will demonstrate safe mobility requirements  1/10/2021 1448 by Paulina Lerma RN  Outcome: Ongoing  Goal: STG - Patient will ambulate  1/10/2021 1448 by Paulina Lerma RN  Outcome: Ongoing     Problem: SAFETY  Goal: LTG - Patient will demonstrate safety requirements appropriate to situation/environment  1/10/2021 1448 by Paulina Lerma RN  Outcome: Ongoing  Goal: STG - Patient uses call light consistently to request assistance with transfers  1/10/2021 1448 by Paulina Lerma RN  Outcome: Ongoing     Problem: IP TOILETING  Goal: LTG - patient will demonstrate use of appropriate intervention for safe toileting  1/10/2021 1448 by Paulina Lerma RN  Outcome: Ongoing  Goal: STG - Patient will complete toileting tasks with  1/10/2021 1448 by Paulina Lerma RN  Outcome: Ongoing     Problem: IP TRANSFERS  Goal: LTG - Patient will demonstrate safe transfer techniques  1/10/2021 1448 by Paulina Lerma RN  Outcome: Ongoing  Goal: LTG - Patient will transfer to [address]  1/10/2021 1448 by Paulina Lerma RN  Outcome: Ongoing  Goal: STG - patient will perform toilet transfer  1/10/2021 1448 by Paulina Lerma RN  Outcome: Ongoing  Goal: STG - Patient will perform tub/shower transfer  1/10/2021 1448 by Paulina Lerma RN  Outcome: Ongoing
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
## 2021-01-12 ENCOUNTER — HOSPITAL ENCOUNTER (OUTPATIENT)
Age: 66
Setting detail: SPECIMEN
Discharge: HOME | End: 2021-01-12
Payer: MEDICARE

## 2021-01-12 LAB
ABSOLUTE EOS #: 0.17 K/UL (ref 0–0.44)
ABSOLUTE IMMATURE GRANULOCYTE: 0.15 K/UL (ref 0–0.3)
ABSOLUTE LYMPH #: 2.36 K/UL (ref 1.1–3.7)
ABSOLUTE MONO #: 0.83 K/UL (ref 0.1–1.2)
ANION GAP SERPL CALCULATED.3IONS-SCNC: 13 MMOL/L (ref 9–17)
BASOPHILS # BLD: 1 % (ref 0–2)
BASOPHILS ABSOLUTE: 0.1 K/UL (ref 0–0.2)
BUN BLDV-MCNC: 23 MG/DL (ref 8–23)
BUN/CREAT BLD: NORMAL (ref 9–20)
CALCIUM SERPL-MCNC: 8.7 MG/DL (ref 8.6–10.4)
CHLORIDE BLD-SCNC: 102 MMOL/L (ref 98–107)
CO2: 24 MMOL/L (ref 20–31)
CREAT SERPL-MCNC: 0.7 MG/DL (ref 0.7–1.2)
DIFFERENTIAL TYPE: ABNORMAL
EOSINOPHILS RELATIVE PERCENT: 1 % (ref 1–4)
GFR AFRICAN AMERICAN: >60 ML/MIN
GFR NON-AFRICAN AMERICAN: >60 ML/MIN
GFR SERPL CREATININE-BSD FRML MDRD: NORMAL ML/MIN/{1.73_M2}
GFR SERPL CREATININE-BSD FRML MDRD: NORMAL ML/MIN/{1.73_M2}
GLUCOSE BLD-MCNC: 84 MG/DL (ref 70–99)
HCT VFR BLD CALC: 30.9 % (ref 40.7–50.3)
HEMOGLOBIN: 8.9 G/DL (ref 13–17)
IMMATURE GRANULOCYTES: 1 %
LYMPHOCYTES # BLD: 18 % (ref 24–43)
MCH RBC QN AUTO: 25.5 PG (ref 25.2–33.5)
MCHC RBC AUTO-ENTMCNC: 28.8 G/DL (ref 28.4–34.8)
MCV RBC AUTO: 88.5 FL (ref 82.6–102.9)
MONOCYTES # BLD: 6 % (ref 3–12)
NRBC AUTOMATED: 0 PER 100 WBC
PDW BLD-RTO: 15.1 % (ref 11.8–14.4)
PLATELET # BLD: 612 K/UL (ref 138–453)
PLATELET ESTIMATE: ABNORMAL
PMV BLD AUTO: 8.8 FL (ref 8.1–13.5)
POTASSIUM SERPL-SCNC: 4.4 MMOL/L (ref 3.7–5.3)
RBC # BLD: 3.49 M/UL (ref 4.21–5.77)
RBC # BLD: ABNORMAL 10*6/UL
SEG NEUTROPHILS: 73 % (ref 36–65)
SEGMENTED NEUTROPHILS ABSOLUTE COUNT: 9.86 K/UL (ref 1.5–8.1)
SODIUM BLD-SCNC: 139 MMOL/L (ref 135–144)
WBC # BLD: 13.5 K/UL (ref 3.5–11.3)
WBC # BLD: ABNORMAL 10*3/UL

## 2021-01-12 PROCEDURE — P9603 ONE-WAY ALLOW PRORATED MILES: HCPCS

## 2021-01-12 PROCEDURE — 85025 COMPLETE CBC W/AUTO DIFF WBC: CPT

## 2021-01-12 PROCEDURE — 80048 BASIC METABOLIC PNL TOTAL CA: CPT

## 2021-01-12 PROCEDURE — 36415 COLL VENOUS BLD VENIPUNCTURE: CPT

## 2021-01-15 ENCOUNTER — OFFICE VISIT (OUTPATIENT)
Dept: PODIATRY | Age: 66
End: 2021-01-15
Payer: MEDICARE

## 2021-01-15 VITALS — TEMPERATURE: 98 F | DIASTOLIC BLOOD PRESSURE: 75 MMHG | SYSTOLIC BLOOD PRESSURE: 135 MMHG | HEART RATE: 99 BPM

## 2021-01-15 DIAGNOSIS — Z79.4 TYPE 2 DIABETES MELLITUS WITH DIABETIC POLYNEUROPATHY, WITH LONG-TERM CURRENT USE OF INSULIN (HCC): ICD-10-CM

## 2021-01-15 DIAGNOSIS — I73.9 PVD (PERIPHERAL VASCULAR DISEASE) (HCC): ICD-10-CM

## 2021-01-15 DIAGNOSIS — T81.31XD WOUND DEHISCENCE, SURGICAL, SUBSEQUENT ENCOUNTER: Primary | ICD-10-CM

## 2021-01-15 DIAGNOSIS — E11.42 TYPE 2 DIABETES MELLITUS WITH DIABETIC POLYNEUROPATHY, WITH LONG-TERM CURRENT USE OF INSULIN (HCC): ICD-10-CM

## 2021-01-15 DIAGNOSIS — S88.111A BELOW-KNEE AMPUTATION OF RIGHT LOWER EXTREMITY (HCC): ICD-10-CM

## 2021-01-15 DIAGNOSIS — Z89.431 S/P TRANSMETATARSAL AMPUTATION OF FOOT, RIGHT (HCC): ICD-10-CM

## 2021-01-15 PROCEDURE — 11042 DBRDMT SUBQ TIS 1ST 20SQCM/<: CPT | Performed by: PODIATRIST

## 2021-01-15 PROCEDURE — 11042 DBRDMT SUBQ TIS 1ST 20SQCM/<: CPT | Performed by: STUDENT IN AN ORGANIZED HEALTH CARE EDUCATION/TRAINING PROGRAM

## 2021-01-15 PROCEDURE — G8484 FLU IMMUNIZE NO ADMIN: HCPCS | Performed by: STUDENT IN AN ORGANIZED HEALTH CARE EDUCATION/TRAINING PROGRAM

## 2021-01-15 PROCEDURE — 99212 OFFICE O/P EST SF 10 MIN: CPT | Performed by: STUDENT IN AN ORGANIZED HEALTH CARE EDUCATION/TRAINING PROGRAM

## 2021-01-15 PROCEDURE — 1036F TOBACCO NON-USER: CPT | Performed by: STUDENT IN AN ORGANIZED HEALTH CARE EDUCATION/TRAINING PROGRAM

## 2021-01-15 PROCEDURE — 99214 OFFICE O/P EST MOD 30 MIN: CPT | Performed by: STUDENT IN AN ORGANIZED HEALTH CARE EDUCATION/TRAINING PROGRAM

## 2021-01-15 PROCEDURE — 3046F HEMOGLOBIN A1C LEVEL >9.0%: CPT | Performed by: STUDENT IN AN ORGANIZED HEALTH CARE EDUCATION/TRAINING PROGRAM

## 2021-01-15 PROCEDURE — 2022F DILAT RTA XM EVC RTNOPTHY: CPT | Performed by: STUDENT IN AN ORGANIZED HEALTH CARE EDUCATION/TRAINING PROGRAM

## 2021-01-15 PROCEDURE — G8427 DOCREV CUR MEDS BY ELIG CLIN: HCPCS | Performed by: STUDENT IN AN ORGANIZED HEALTH CARE EDUCATION/TRAINING PROGRAM

## 2021-01-15 PROCEDURE — 1111F DSCHRG MED/CURRENT MED MERGE: CPT | Performed by: STUDENT IN AN ORGANIZED HEALTH CARE EDUCATION/TRAINING PROGRAM

## 2021-01-15 PROCEDURE — G8420 CALC BMI NORM PARAMETERS: HCPCS | Performed by: STUDENT IN AN ORGANIZED HEALTH CARE EDUCATION/TRAINING PROGRAM

## 2021-01-15 PROCEDURE — 3017F COLORECTAL CA SCREEN DOC REV: CPT | Performed by: STUDENT IN AN ORGANIZED HEALTH CARE EDUCATION/TRAINING PROGRAM

## 2021-01-15 RX ORDER — LIDOCAINE HYDROCHLORIDE 20 MG/ML
JELLY TOPICAL ONCE
Status: COMPLETED | OUTPATIENT
Start: 2021-01-15 | End: 2021-01-15

## 2021-01-15 RX ADMIN — LIDOCAINE HYDROCHLORIDE: 20 JELLY TOPICAL at 15:37

## 2021-01-15 NOTE — PROGRESS NOTES
Patient instructed to remove shoes and socks and instructed to sit in exam chair. Current PCP is Lavell Brumfield DO and date of last visit was 09/14/20. Do you have a follow up visit scheduled? No  If yes, the date is n/a  Diabetic visit information    Blood pressure (Control is BP <140/90)  BP Readings from Last 3 Encounters:   01/11/21 (!) 147/79   01/05/21 103/60   01/01/21 (!) 98/53       BP taken with correct size cuff? - Yes   Repeated if > 140/90 Yes      Tobacco use:  Patient  reports that he has been smoking cigarettes. He has a 30.00 pack-year smoking history. He quit smokeless tobacco use about 41 years ago. His smokeless tobacco use included chew. If Smoker - Cessation materials given? - Yes       Diabetic Health Maintenance Items due  Diabetes Management   Topic Date Due    Diabetic retinal exam  03/18/2016    Lipid screen  06/24/2019    Diabetic microalbuminuria test  01/10/2020       Diabetic retinal exam done in last year? - Yes   If No: remind patient that it is due and they should schedule an exam    Medications  Is patient taking any medications for diabetes? -   Yes  Have blood sugars been controlled? Fasting blood sugars under 120   -   Yes   Random home sugars or today's POCT glucose is under 180 -   Yes   []  If No to the above then patient should schedule appt with PCP.      Diabetic Plan    A1C Plan  Lab Results   Component Value Date    LABA1C 13.4 (H) 12/31/2020    LABA1C 13.2 (H) 12/31/2020    LABA1C 13.0 07/08/2020      []  If A1C over 8 and last result >3 months ago - Order A1C and refer to PCP   []  If last A1C over 6 months ago - Order A1C and refer to PCP for follow up   []  If elevated blood sugars > 180 - refer to PCP for follow up    []  Blood sugar controlled - A1C under 8 and last check was < 6 months      Cholesterol Plan   Lab Results   Component Value Date    LDLCHOLESTEROL 90 06/24/2018      []  If LDL > 100 and last result >3 months ago - order Fasting lipids and refer to PCP for follow up   []  If LDL < 100 and over 1 year ago - Order Fasting lipids and refer to PCP for follow up   [] LDL is controlled. LDL < 100 and checked within the last year     Blood Pressure  BP Readings from Last 3 Encounters:   01/11/21 (!) 147/79   01/05/21 103/60   01/01/21 (!) 98/53      []  If SBP >140 mmhg - refer to PCP for follow up   []  If DBP > 90 mmhg - refer to PCP for follow up   [] BP is controlled <140/90     Order labs as PCP ordered.   (ie: Lipids, A1C, CMP)
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
## 2021-01-15 NOTE — LETTER
[facility]  [address] [address]  Phone: [phone]  Fax: [phone]    Jhoan Maldonado        January 15, 2021     Patient: Janine Wallace   YOB: 1955   Date of Visit: 1/15/2021       To Whom It May Concern:    Mr. Miya Rogers will need daily dressing changes that consist of the following: Dakin's soaked gauze, 4 x 4's, ABDs, Kerlix, Ace. Please perform these dressing changes until the wound VAC arrives. When the wound VAC arrives please apply. Wound VAC instructions: To be changed every Monday, Wednesday, and Friday as follows: Window the edges of the wound and surrounding skin with adhesive wrap that is supplied with the VAC, White granuo-foam to exposed bone, black granufoam in remaining open wound. Set wound VAC to low, continuous at 125 mmHg. If you have any questions or concerns, please don't hesitate to call.     Sincerely,        Shira Jensen DPM

## 2021-01-18 ENCOUNTER — TELEPHONE (OUTPATIENT)
Dept: PODIATRY | Age: 66
End: 2021-01-18

## 2021-01-18 NOTE — TELEPHONE ENCOUNTER
Julia Kam called wanting to know if the [facility] for [facility] vac.

## 2021-01-19 ENCOUNTER — TELEPHONE (OUTPATIENT)
Dept: PODIATRY | Age: 66
End: 2021-01-19

## 2021-01-22 ENCOUNTER — HOSPITAL ENCOUNTER (OUTPATIENT)
Dept: WOUND CARE | Age: 66
Discharge: HOME | End: 2021-01-22
Payer: MEDICARE

## 2021-01-22 VITALS
SYSTOLIC BLOOD PRESSURE: 114 MMHG | DIASTOLIC BLOOD PRESSURE: 70 MMHG | BODY MASS INDEX: 20.54 KG/M2 | RESPIRATION RATE: 18 BRPM | WEIGHT: 155 LBS | HEIGHT: 73 IN | TEMPERATURE: 97.7 F

## 2021-01-22 DIAGNOSIS — Z79.4 TYPE 2 DIABETES MELLITUS WITH DIABETIC POLYNEUROPATHY, WITH LONG-TERM CURRENT USE OF INSULIN (HCC): ICD-10-CM

## 2021-01-22 DIAGNOSIS — L97.522 FOOT ULCER WITH FAT LAYER EXPOSED, LEFT (HCC): ICD-10-CM

## 2021-01-22 DIAGNOSIS — E11.42 TYPE 2 DIABETES MELLITUS WITH DIABETIC POLYNEUROPATHY, WITH LONG-TERM CURRENT USE OF INSULIN (HCC): ICD-10-CM

## 2021-01-22 DIAGNOSIS — Z89.511 HISTORY OF BELOW KNEE AMPUTATION, RIGHT (HCC): ICD-10-CM

## 2021-01-22 DIAGNOSIS — E11.621 DIABETIC ULCER OF LEFT MIDFOOT ASSOCIATED WITH TYPE 2 DIABETES MELLITUS, WITH NECROSIS OF BONE (HCC): Primary | ICD-10-CM

## 2021-01-22 DIAGNOSIS — L97.424 DIABETIC ULCER OF LEFT MIDFOOT ASSOCIATED WITH TYPE 2 DIABETES MELLITUS, WITH NECROSIS OF BONE (HCC): Primary | ICD-10-CM

## 2021-01-22 DIAGNOSIS — E44.0 MODERATE PROTEIN MALNUTRITION (HCC): ICD-10-CM

## 2021-01-22 DIAGNOSIS — M86.672 CHRONIC OSTEOMYELITIS INVOLVING LEFT ANKLE AND FOOT (HCC): ICD-10-CM

## 2021-01-22 PROBLEM — M86.9 OSTEOMYELITIS OF FOURTH TOE OF LEFT FOOT (HCC): Status: RESOLVED | Noted: 2020-07-31 | Resolved: 2021-01-22

## 2021-01-22 PROCEDURE — 11043 DBRDMT MUSC&/FSCA 1ST 20/<: CPT

## 2021-01-22 PROCEDURE — 11042 DBRDMT SUBQ TIS 1ST 20SQCM/<: CPT | Performed by: NURSE PRACTITIONER

## 2021-01-22 PROCEDURE — 86403 PARTICLE AGGLUT ANTBDY SCRN: CPT

## 2021-01-22 PROCEDURE — 11042 DBRDMT SUBQ TIS 1ST 20SQCM/<: CPT

## 2021-01-22 PROCEDURE — 11046 DBRDMT MUSC&/FSCA EA ADDL: CPT

## 2021-01-22 PROCEDURE — 87205 SMEAR GRAM STAIN: CPT

## 2021-01-22 PROCEDURE — 99203 OFFICE O/P NEW LOW 30 MIN: CPT | Performed by: NURSE PRACTITIONER

## 2021-01-22 PROCEDURE — 99213 OFFICE O/P EST LOW 20 MIN: CPT

## 2021-01-22 PROCEDURE — 87075 CULTR BACTERIA EXCEPT BLOOD: CPT

## 2021-01-22 PROCEDURE — 11046 DBRDMT MUSC&/FSCA EA ADDL: CPT | Performed by: NURSE PRACTITIONER

## 2021-01-22 PROCEDURE — 11043 DBRDMT MUSC&/FSCA 1ST 20/<: CPT | Performed by: NURSE PRACTITIONER

## 2021-01-22 PROCEDURE — 87176 TISSUE HOMOGENIZATION CULTR: CPT

## 2021-01-22 PROCEDURE — 87070 CULTURE OTHR SPECIMN AEROBIC: CPT

## 2021-01-22 RX ORDER — LIDOCAINE 40 MG/G
CREAM TOPICAL ONCE
Status: CANCELLED | OUTPATIENT
Start: 2021-01-22 | End: 2021-01-22

## 2021-01-22 RX ORDER — LIDOCAINE 50 MG/G
OINTMENT TOPICAL ONCE
Status: CANCELLED | OUTPATIENT
Start: 2021-01-22 | End: 2021-01-22

## 2021-01-22 RX ORDER — LIDOCAINE HYDROCHLORIDE 20 MG/ML
JELLY TOPICAL ONCE
Status: CANCELLED | OUTPATIENT
Start: 2021-01-22 | End: 2021-01-22

## 2021-01-22 RX ORDER — LIDOCAINE HYDROCHLORIDE 40 MG/ML
SOLUTION TOPICAL ONCE
Status: COMPLETED | OUTPATIENT
Start: 2021-01-22 | End: 2021-01-22

## 2021-01-22 RX ORDER — LIDOCAINE HYDROCHLORIDE 40 MG/ML
SOLUTION TOPICAL ONCE
Status: CANCELLED | OUTPATIENT
Start: 2021-01-22 | End: 2021-01-22

## 2021-01-22 RX ADMIN — LIDOCAINE HYDROCHLORIDE 10 ML: 40 SOLUTION TOPICAL at 10:46

## 2021-01-22 ASSESSMENT — PAIN DESCRIPTION - ORIENTATION: ORIENTATION: LEFT

## 2021-01-22 ASSESSMENT — PAIN DESCRIPTION - LOCATION: LOCATION: FOOT

## 2021-01-22 ASSESSMENT — PAIN DESCRIPTION - ONSET: ONSET: ON-GOING

## 2021-01-22 ASSESSMENT — PAIN SCALES - GENERAL: PAINLEVEL_OUTOF10: 7

## 2021-01-22 ASSESSMENT — PAIN DESCRIPTION - PROGRESSION: CLINICAL_PROGRESSION: NOT CHANGED
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
## 2021-01-25 ENCOUNTER — HOSPITAL ENCOUNTER (OUTPATIENT)
Dept: WOUND CARE | Age: 66
Discharge: HOME | End: 2021-01-25
Payer: MEDICARE

## 2021-01-25 VITALS
DIASTOLIC BLOOD PRESSURE: 69 MMHG | WEIGHT: 155 LBS | BODY MASS INDEX: 20.54 KG/M2 | SYSTOLIC BLOOD PRESSURE: 121 MMHG | RESPIRATION RATE: 18 BRPM | TEMPERATURE: 96.2 F | HEART RATE: 112 BPM | HEIGHT: 73 IN

## 2021-01-25 DIAGNOSIS — Z89.511 HISTORY OF BELOW KNEE AMPUTATION, RIGHT (HCC): ICD-10-CM

## 2021-01-25 DIAGNOSIS — E44.0 MODERATE PROTEIN MALNUTRITION (HCC): ICD-10-CM

## 2021-01-25 DIAGNOSIS — L97.424 DIABETIC ULCER OF LEFT MIDFOOT ASSOCIATED WITH TYPE 2 DIABETES MELLITUS, WITH NECROSIS OF BONE (HCC): Primary | ICD-10-CM

## 2021-01-25 DIAGNOSIS — E11.621 DIABETIC ULCER OF LEFT MIDFOOT ASSOCIATED WITH TYPE 2 DIABETES MELLITUS, WITH FAT LAYER EXPOSED (HCC): Chronic | ICD-10-CM

## 2021-01-25 DIAGNOSIS — L97.522 FOOT ULCER WITH FAT LAYER EXPOSED, LEFT (HCC): ICD-10-CM

## 2021-01-25 DIAGNOSIS — Z79.4 TYPE 2 DIABETES MELLITUS WITH DIABETIC POLYNEUROPATHY, WITH LONG-TERM CURRENT USE OF INSULIN (HCC): ICD-10-CM

## 2021-01-25 DIAGNOSIS — M86.672 CHRONIC REFRACTORY OSTEOMYELITIS OF LEFT FOOT (HCC): ICD-10-CM

## 2021-01-25 DIAGNOSIS — L97.422 DIABETIC ULCER OF LEFT MIDFOOT ASSOCIATED WITH TYPE 2 DIABETES MELLITUS, WITH FAT LAYER EXPOSED (HCC): Chronic | ICD-10-CM

## 2021-01-25 DIAGNOSIS — E11.42 TYPE 2 DIABETES MELLITUS WITH DIABETIC POLYNEUROPATHY, WITH LONG-TERM CURRENT USE OF INSULIN (HCC): ICD-10-CM

## 2021-01-25 DIAGNOSIS — M86.672 CHRONIC OSTEOMYELITIS INVOLVING LEFT ANKLE AND FOOT (HCC): ICD-10-CM

## 2021-01-25 DIAGNOSIS — E11.621 DIABETIC ULCER OF LEFT MIDFOOT ASSOCIATED WITH TYPE 2 DIABETES MELLITUS, WITH NECROSIS OF BONE (HCC): Primary | ICD-10-CM

## 2021-01-25 PROCEDURE — 97605 NEG PRS WND THER DME<=50SQCM: CPT

## 2021-01-25 PROCEDURE — 11046 DBRDMT MUSC&/FSCA EA ADDL: CPT | Performed by: INTERNAL MEDICINE

## 2021-01-25 PROCEDURE — 11046 DBRDMT MUSC&/FSCA EA ADDL: CPT

## 2021-01-25 PROCEDURE — 11043 DBRDMT MUSC&/FSCA 1ST 20/<: CPT | Performed by: INTERNAL MEDICINE

## 2021-01-25 PROCEDURE — 11043 DBRDMT MUSC&/FSCA 1ST 20/<: CPT

## 2021-01-25 PROCEDURE — 99214 OFFICE O/P EST MOD 30 MIN: CPT

## 2021-01-25 RX ORDER — LIDOCAINE HYDROCHLORIDE 20 MG/ML
JELLY TOPICAL ONCE
Status: CANCELLED | OUTPATIENT
Start: 2021-01-25 | End: 2021-01-25

## 2021-01-25 RX ORDER — LIDOCAINE HYDROCHLORIDE 40 MG/ML
SOLUTION TOPICAL ONCE
Status: CANCELLED | OUTPATIENT
Start: 2021-01-25 | End: 2021-01-25

## 2021-01-25 RX ORDER — LIDOCAINE HYDROCHLORIDE 40 MG/ML
SOLUTION TOPICAL ONCE
Status: COMPLETED | OUTPATIENT
Start: 2021-01-25 | End: 2021-01-25

## 2021-01-25 RX ORDER — LIDOCAINE 40 MG/G
CREAM TOPICAL ONCE
Status: CANCELLED | OUTPATIENT
Start: 2021-01-25 | End: 2021-01-25

## 2021-01-25 RX ORDER — LIDOCAINE 50 MG/G
OINTMENT TOPICAL ONCE
Status: CANCELLED | OUTPATIENT
Start: 2021-01-25 | End: 2021-01-25

## 2021-01-25 RX ADMIN — LIDOCAINE HYDROCHLORIDE 5 ML: 40 SOLUTION TOPICAL at 10:29

## 2021-01-25 ASSESSMENT — PAIN DESCRIPTION - FREQUENCY: FREQUENCY: CONTINUOUS

## 2021-01-25 ASSESSMENT — PAIN DESCRIPTION - LOCATION: LOCATION: FOOT

## 2021-01-25 ASSESSMENT — PAIN DESCRIPTION - DESCRIPTORS: DESCRIPTORS: SHARP;THROBBING

## 2021-01-25 ASSESSMENT — PAIN - FUNCTIONAL ASSESSMENT: PAIN_FUNCTIONAL_ASSESSMENT: PREVENTS OR INTERFERES SOME ACTIVE ACTIVITIES AND ADLS

## 2021-01-25 ASSESSMENT — PAIN SCALES - GENERAL: PAINLEVEL_OUTOF10: 6

## 2021-01-25 ASSESSMENT — PAIN DESCRIPTION - ORIENTATION: ORIENTATION: LEFT

## 2021-01-25 NOTE — PROGRESS NOTES
[address]   Progress Note and Procedure Note      [address] RECORD NUMBER:  [id number]  AGE: 63 y.o. GENDER: male  : 1955  EPISODE DATE:  2021    Subjective:     Chief Complaint   Patient presents with    Wound Check         HISTORY of PRESENT ILLNESS HPI     Renetta Olsen is a 63 y.o. male who presents today for wound/ulcer evaluation. History of Wound Context: The patient presented with nonhealing wound at the left transmetatarsal amputation stump with bone exposure, underlying osteomyelitis of the first and fifth metatarsal status post recent incision and drainage for multiple abscesses tracking along the tendon and to the plantar aspect of the calcaneus on 2021 group B streptococcus and E. coli growth on culture. The patient had PICC line placed and was discharged on IV ceftriaxone that he is receiving at [facility]. He also had peripheral arterial disease and diabetes mellitus. BKA was recommended but patient declined and wanted to try IV antibiotics and hyperbaric oxygen treatment. The patient had PICC line and was discharged  Wound/Ulcer Pain Timing/Severity: intermittent  Quality of pain: dull  Severity:  4 / 10   Modifying Factors: None  Associated Signs/Symptoms: drainage    Ulcer Identification:  Ulcer Type: diabetic    Contributing Factors: diabetes, poor glucose control, decreased mobility, smoking, decreased tissue oxygenation, malnutrition, poor hygiene and non-adherence    He denied any fever or chills, denied nausea or vomiting, no diarrhea no other complaints.     PAST MEDICAL HISTORY        Diagnosis Date    Allergic rhinitis     Cellulitis of right heel     Chronic refractory osteomyelitis of left foot ([facility] 2021    [facility]     Diabetic neuropathy ([facility]     dr. Laney Neal, podiatrist    Dizziness     DM (diabetes mellitus) ([facility]     , endocrinologist    hours for 14 days Compound per protocol 28 g 0    ondansetron (ZOFRAN ODT) 4 MG disintegrating tablet Take 1 tablet by mouth every 8 hours as needed for Nausea 20 tablet 0    apixaban (ELIQUIS) 5 MG TABS tablet Take 1 tablet by mouth 2 times daily 180 tablet 1    albuterol sulfate HFA (VENTOLIN HFA) 108 (90 Base) MCG/ACT inhaler Inhale 2 puffs into the lungs every 6 hours as needed for Wheezing      nortriptyline (PAMELOR) 25 MG capsule Take 50 mg by mouth nightly      metFORMIN (GLUCOPHAGE) 500 MG tablet Take 1 tablet by mouth 2 times daily (with meals) 180 tablet 5     No current facility-administered medications on file prior to encounter. REVIEW OF SYSTEMS  Review of Systems    Pertinent items are noted in HPI.     Objective:      /69   Pulse 112   Temp 96.2 °F (35.7 °C) (Tympanic)   Resp 18   Ht 6' 1\" (1.854 m)   Wt 155 lb (70.3 kg)   BMI 20.45 kg/m²     Wt Readings from Last 3 Encounters:   01/25/21 155 lb (70.3 kg)   01/22/21 155 lb (70.3 kg)   01/11/21 150 lb (68 kg)       PHYSICAL EXAM  Physical Exam    General Appearance: alert and oriented to person, place and time, well-developed and well-nourished, in no acute distress      Assessment:        Problem List Items Addressed This Visit     Diabetic ulcer of left midfoot associated with type 2 diabetes mellitus, with fat layer exposed ([address] (Chronic)    Type 2 diabetes mellitus with diabetic polyneuropathy, with long-term current use of insulin (HCC)    Relevant Orders    Supply: Wound Cleanser    Supply: Wound Dressings    Supply: Cover and Secure    Osteomyelitis, chronic, ankle or foot ([address]    Relevant Orders    Supply: Wound Cleanser    Supply: Wound Dressings    Supply: Cover and Secure    Moderate protein malnutrition ([address]    Relevant Orders    Supply: Wound Cleanser    Supply: Wound Dressings    Supply: Cover and Secure    Uncontrolled type 2 diabetes mellitus with foot ulcer ([address]    Relevant Orders    Supply: Wound Cleanser Supply: Wound Dressings    Supply: Cover and Secure    Diabetic ulcer of left midfoot associated with type 2 diabetes mellitus, with necrosis of bone ([facility] - Primary    Relevant Orders    Supply: Wound Cleanser    Supply: Wound Dressings    Supply: Cover and Secure    History of below knee amputation, right ([address]    Relevant Orders    Supply: Wound Cleanser    Supply: Wound Dressings    Supply: Cover and Secure    Foot ulcer with fat layer exposed, left ([address]    Relevant Orders    Supply: Wound Cleanser    Supply: Wound Dressings    Supply: Cover and Secure    Chronic refractory osteomyelitis of left foot ([facility]           Procedure Note  Indications:  Based on my examination of this patient's wound(s)/ulcer(s) today, debridement is required to promote healing and evaluate the wound base. Performed by: Mario Horne MD    Consent obtained:  Yes    Time out taken:  Yes    Pain Control: Anesthetic  Anesthetic: 4% Lidocaine Liquid Topical       Debridement: Excisional Debridement    Using curette the wound(s)/ulcer(s) was/were debrided down through and including the removal of subcutaneous tissue and muscle/fascia. Devitalized Tissue Debrided:  fibrin, biofilm and exudate    Pre Debridement Measurements:  Are located in the [facility]  Documentation Flow Sheet        Wound/Ulcer #: 1    Post Debridement Measurements:  Wound/Ulcer Descriptions are Pre Debridement except measurements:    Wound 01/22/21 Foot Left;Lateral wound #1 left lateral foot (Active)   Wound Image   01/22/21 1013   Dressing Status New dressing applied; Old drainage noted 01/25/21 1030   Wound Cleansed Irrigated with saline 01/25/21 1030   Dressing/Treatment Moist to dry;Moisten with saline 01/22/21 1013   Wound Length (cm) 6 cm 01/25/21 1030   Wound Width (cm) 5.3 cm 01/25/21 1030   Wound Depth (cm) 2.1 cm 01/25/21 1030   Wound Surface Area (cm^2) 31.8 cm^2 01/25/21 1030   Change in Wound Size % (l*w) 15.87 01/25/21 1030   Wound Volume (cm^3) 66.78 cm^3 01/25/21 1030   Wound Healing % 20 01/25/21 1030   Post-Procedure Length (cm) 6 cm 01/25/21 1030   Post-Procedure Width (cm) 5.3 cm 01/25/21 1030   Post-Procedure Depth (cm) 2.1 cm 01/25/21 1030   Post-Procedure Surface Area (cm^2) 31.8 cm^2 01/25/21 1030   Post-Procedure Volume (cm^3) 66.78 cm^3 01/25/21 1030   Wound Assessment Granulation tissue;Pink/red;Slough 01/25/21 1030   Drainage Amount Moderate 01/25/21 1030   Drainage Description Serosanguinous 01/25/21 1030   Odor None 01/25/21 1030   Odalys-wound Assessment Maceration 01/25/21 1030   Margins Flat/open edges 01/25/21 1030   Number of days: 3       Wound 01/22/21 Foot Anterior; Left WOUND # 2 LEFT DORSAL FOOT (Active)   Wound Etiology Diabetic Jones 2 01/25/21 1030   Dressing Status New dressing applied; Old drainage noted 01/25/21 1030   Wound Cleansed Irrigated with saline 01/25/21 1030   Offloading for Diabetic Foot Ulcers Yes (type) 01/22/21 1047   Wound Length (cm) 1.1 cm 01/25/21 1030   Wound Width (cm) 1 cm 01/25/21 1030   Wound Depth (cm) 0.1 cm 01/25/21 1030   Wound Surface Area (cm^2) 1.1 cm^2 01/25/21 1030   Change in Wound Size % (l*w) 0 01/25/21 1030   Wound Volume (cm^3) 0.11 cm^3 01/25/21 1030   Wound Healing % 50 01/25/21 1030   Post-Procedure Length (cm) 1.1 cm 01/25/21 1030   Post-Procedure Width (cm) 1 cm 01/25/21 1030   Post-Procedure Depth (cm) 0.1 cm 01/25/21 1030   Post-Procedure Surface Area (cm^2) 1.1 cm^2 01/25/21 1030   Post-Procedure Volume (cm^3) 0.11 cm^3 01/25/21 1030   Wound Assessment Slough 01/25/21 1030   Drainage Amount Moderate 01/25/21 1030   Drainage Description Yellow 01/25/21 1030   Odor None 01/25/21 1030   Odalys-wound Assessment Blanchable erythema 01/25/21 1030   Margins Defined edges 01/25/21 1030   Number of days: 3          Total Surface Area Debrided:  31.8 sq cm     Estimated Blood Loss:  Minimal    Hemostasis Achieved:  by pressure    Procedural Pain:  4  / 10     Post Procedural Pain:  2 / 10 Response to treatment:  Well tolerated by patient. Plan:   The patient on IV ceftriaxone to finish 6 weeks course  He had follow-up with Dr. Lili Isaac later today  Treatment Note please see attached Discharge Instructions    Written patient dismissal instructions given to patient and signed by patient or POA. Discharge Instructions         [address] [facility]                                 Visit  Discharge Instructions / Physician Orders  DATE: 01/25/2021     Home Care: n/a [facility])     SUPPLIES ORDERED THRU: n/a     Wound Location:  Left lateral foot, left dorsal foot                                 Cleanse with: Liquid antibacterial soap and water, rinse well      Dressing Orders: Left lateral foot: Skin prep and drape to periwound. Apply adaptic over bone, fill wound with foam, apply wound vac at 150mmhg continuous                                  Left dorsal foot: Beverley to wound, cover with dry dressing    Frequency:  Change all dressings Monday, Wednesday, Friday     Additional Orders: Increase protein to diet (meat, cheese, eggs, fish, peanut butter, nuts and beans)  Multivitamin daily  Please bring wound vac dressing and canister to appointments and we will reapply after wound care appointments     MY CHART []?     Smart Device  []?      HYPERBARIC TREATMENT-                TREATMENT #     Your next appointment with the [address] [place] [address] is in 1 week                                                                                                    ROOM TYPE   []? CHAIR     []? BED   []? EITHER        TIME []? 45 MIN     []? 60 MIN     (Please note your next appointment above and if you are unable to keep, kindly give a 24 hour notice.  Thank you.)     If you experience any of the following, please call the [address] [place] [address] during business hours:  [phone]     * Increase in Pain  * Temperature over 101  * Increase in drainage from your wound  * Drainage with a foul odor  * Bleeding  * Increase in swelling  * Need for compression bandage changes due to slippage, breakthrough drainage.     If you need medical attention outside of the business hours of the [address] [place] [address] Road please contact your PCP or go to the nearest emergency room. The information contained in the After Visit Summary has been reviewed with me, the patient and/or responsible adult, by my health care provider(s). I had the opportunity to ask questions regarding this information. I have elected to receive;      []? After Visit Summary  [x]? Comprehensive Discharge Instruction        Patient signature______________________________________Date:________  Electronically signed by Mayelin Devlin RN on 1/25/2021 at 12:05 PM          Electronically signed by Zina Ham MD on 1/25/2021 at 12:15 PM
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
## 2021-01-25 NOTE — PROGRESS NOTES
Hyperbaric Oxygen Therapy   Patient Orientation      NAME: Zen Mclean RECORD NUMBER:  [id number]  AGE: 63 y.o. GENDER: male  : 1955  EPISODE DATE:  2021    HBO Orientation Completed with:  Patient         INTRODUCTION   Welcome to the [address] [place] [address]. This guide will assist in answering any questions which you might have concerning your scheduled hyperbaric oxygen treatment and if appropriate, any wound care you might need. During your stay with us you will hear your treatment called a dive. This is just a nickname given to the procedure and does not refer to being in water. You will only be exposed to air pressure changes during your treatments. HYPERBARIC OXYGEN THERAPY  Hyperbaric oxygen treatment is a means of providing additional oxygen to your body tissues. By increasing the oxygen in the tissues, healing is enhanced. Two important effects on difficult wounds are first, to speed new microscopic blood vessel growth into the wound and second, to improve the ability of your white blood cells to kill germs. It is important to know that hyperbaric oxygen is an additional (adjunctive) therapy in addition to the current medical or surgical care you are receiving. It is also essential that you understand that this is not a cure-all but a part of your total medical care. THE STAFF  [facility] staff consists of fully trained physicians, nursing staff, and chamber operators. There will always be a physician, as well as other staff members with you in the department while you are having your hyperbaric oxygen therapy treatment. Please feel free to ask for help from any of the staff members while you are here. THE CHAMBER  The hyperbaric chamber located at the [address] [place] [address] is a monoplace, seamless acrylic pressure chamber designed to treat one patient at a time. You will be lying down with your head slightly elevated for your treatments.   A communication system allows you to speak with the , family members or the physician. You will also be able to watch and listen to television during treatment. The monoplace chamber administers 100% oxygen at a physician prescribed pressure. Because you will be in an oxygen environment, a detailed list of safety precautions and guidelines will be strictly enforced for your safety. TREATMENT SCHEDULE  You will need to arrange for your own transportation. If there is difficulty, we will try to assist in making the necessary arrangements with an appropriate agency. Hyperbaric treatments are given daily, Monday through Friday. Each treatment will last almost two (2) hours. Be prepared to spend approximately three (3) hours at our facility. This allows time for preparing you for your treatment dive and the actual time in the chamber. It is essential that you try to make every scheduled treatment dive possible so that the effects of the hyperbaric oxygen will continue. Any long interruption could cause the healing enhancement to slow or even stop. If at any time you have chills, fever, nausea, vomiting, diarrhea or any problem with your glucose levels, please inform the physician or the nurse. You will be assessed to see if you should be in the chamber that day. You will receive a complete briefing by a staff member. Necessary forms and informed consents (permits) will need to be signed before treatments begin. You will also be given a tour of the area . VISITORS  Visitors are welcome and we encourage patients to bring their families. We ask that once your family has seen the facility that they remain in the waiting room to ensure your privacy, and the privacy of the other patients. Visitors are not allowed in the treatment area unless their presence is needed by the physician. Again, we welcome you to our facility.   Please let us know if there is anything that we can do to assist you during your time with us. GENERAL INFORMATION REVIEWED: Yes      You will need to arrive 30 minutes prior to your visit. Please call us if you are not feeling well the day of your visit, so that we can determine if it will be necessary to reschedule your treatment (before you arrive). Each visit will begin with a staff member asking you a series of questions. These questions may sound repetitive, but please understand, for safety reasons, that we must ask the same questions each and every visit. Only chamber approved clothing may be worn during treatment, therefore special clothing is provided for you at each visit. For safety reasons, chamber operators are required to ask about prohibited items each and every visit. Vital signs (blood pressure, temperature pulse, and respirations) will be taken both before and after each visit. PRESSURE CHANGES INFORMATION REVIEWED: Yes   As the pressure within the chamber changes, you may notice changes to your ears, sinuses or lungs. For example, your ears may \"pop\" as if you are traveling on an airplane or scuba diving. Please notify the chamber  if you are experiencing pain in your ears, sinuses or lungs during your treatment \"dive. \"      To help prevent pain or injury to your ears, you will be taught by the staff and will practice thoroughly a procedure, known as the Valsalva maneuver, as well as other techniques prior to your first treatment \"dive. \"  Although the pressure in the chamber is not felt on the body, you do feel changes in the ears. The eardrum is normally flat while you are at ground level. Pressure changes in the atmosphere around you will usually be felt in the ears. The eardrum tends to bow inwardly and unless you take positive action, fullness or pain will be felt.   In order to avoid this, you will be taught how to force air into the middle ear during the few minutes that it takes to pressurize the chamber. To help prevent pain or injury to your lungs, please make sure to notify a staff member if you have any upper respiratory infection and/or congestion. It is very important not to hold your breath during your treatment \"dive\", just breath normally. PATIENTS WITH DIABETES ONLY Yes  If you have diabetes your blood sugar level will be tested before each and every treatment. If your blood sugar level is too low, we will attempt to help you raise it by providing a diabetic nutritional shake. We will retest your blood sugar shortly thereafter. If your blood sugar level is still low, we may not be able to provide a treatment \"dive\" that day. If your blood sugar levels is too high, we also may not be able to provide a treatment \"dive\" that day. If your blood sugar level continues to be too high or too low, not allowing you to continue with the hyperbaric treatments, you will need to contact your primary physician to help manage your blood sugar more effectively. ABOUT YOUR TREATMENT INFORMATION REVIEWED: Yes   If you are feeling nervous or anxious about these treatments, please let a staff member know. We are here to help you. Before each and every treatment, please let us know of any changes regarding:    your medication, especially cancer medication  any possibility of being pregnant  any new implants or devices    Temporary vision changes may occur during hyperbaric oxygen therapy. Therefore, it is recommended that you not change eyeglass prescriptions during therapy. Changes that occur during therapy should return to pre-treatment baseline within several weeks of the conclusion of therapy.  In the rare instance that vision has not returned to the pre-treatment baseline; it is recommended that you schedule an eye exam with your Opthalmalogist.    It is very important to make the clinical staff aware if you have ever had a collapsed lung    ITEMS TO AVOID INFORMATION REVIEWED: Yes Smoking has a negative effect on treatment and may diminish the benefits you may receive. Smoking within 2 hours prior to your treatment poses additional risk and should be avoided completely. Drinking alcohol or using illicit drugs may also have a negative effect on your treatment and should be avoided. Drinking carbonated beverages such as soda pop within 1 hour of your treatment could cause pains in the stomach during the treatment. Caffeinated beverages or foods containing caffeine should be avoided before your treatment. Please let us know if we can assist you with seeking help to stop smoking, drinking or using recreational drugs. PROHIBITED ITEMS INFORMATION REVIEWED: Yes   No wigs, hair spray, hair oils, hair ornaments, creams, lotions, make-up, after shave, perfumes, colognes, chap stick, lip balms, mustache waxes, petroleum products (e.g. Vaseline), baby oil, deodorant or ointments  No nail polish    No synthetic clothing  No nylon hose, underwear, panty hose or bra  No food, gum or candy  No jewelry  No smoking materials such as lighter, cigarettes or matches  No reading material  No hearing aids, non-fixed dentures  No Hard (non-gas permeable) contact lenses  Most dressings are approved to wear into the hyperbaric chamber. Please advise the hyperbaric staff of any new dressings applied to your wound to ensure the new dressings are compatible with hyperbaric oxygen treatments. No alcohol preps  No heat packs  No street clothes or shoes  No cell phones or other electronics  If it was not a part of you when you were born into this world, if it was not provided by the chamber , then it cannot go into the chamber with you.         Electronically signed by Mami Webster RN on 1/25/2021 at 10:37 AM

## 2021-01-25 NOTE — PROGRESS NOTES
Negative Pressure    NAME:  Pierce Salas  YOB: 1955  MEDICAL RECORD NUMBER:  [id number]  DATE:  1/25/2021     Applied Negative Pressure to Left foot wound(s)/ulcer(s).  [x] Applied skin barrier prep to mireya-wound.  [x] Cut strips of plastic drape to picture frame wound so that mireya-wound is     covered with the drape.  [] If bridging dressing to less prominent site, cover any intact skin that will come in contact with the Negative Pressure Therapy sponge, gauze or channel drain with plastic drape. The sponge should never touch intact skin.  [x] Cut sponge, gauze or channel drain to size which will fit into the wound/ulcer bed without being forced.  [x] Be sure the sponge is large enough to hold the entire round plastic flange which is attached to the tubing. Never allow flange to be larger than the sponge or it will produce suction damaging intact skin.  Total number of individual pieces of foam used within the wound bed: 1     [x] If bridging the dressing away from the primary site, be sure the bridge leads to a piece of sponge large enough to hold the entire flange without allowing any of the flange to overlap onto intact skin.  [x] Covered sponge, gauze or channel drain with plastic drape.  [x] Cut a hole in this plastic drape directly over the sponge the same size as the plastic drain tubing.  [x] Removed plastic liner from flange and apply it directly over the hole you cut.  [x] Removed the plastic cover from the flange.  [x] Attached the tubing to the wound/ulcer Negative Pressure Therapy and turn it on to be sure a vacuum is created and that there are no leaks.  [x] If air leaks occur, use plastic drape to patch them.  [x] Secured Negative Pressure Therapy dressing with ace wrap loosely if located on an extremity. Maintain tubing outside of ace wrap. Tubing must not exert pressure on intact skin.     Applied per [employer] Guidelines  SET AT 150mmHg  Electronically signed by Maggi Vital RN on 1/25/2021 at 12:48 PM
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
## 2021-01-28 ENCOUNTER — FOLLOWUP TELEPHONE ENCOUNTER (OUTPATIENT)
Dept: WOUND CARE | Age: 66
End: 2021-01-28

## 2021-01-28 NOTE — PROGRESS NOTES
Called pt to follow up on insurance concerns. Spoke with pt and explained to him that [facility] wound care was unable to start Hyperbaric authorization due to the policy ending 3/63/44. Expressed to patient that he needs to contact them to see what his options are for coverage past that date. Patient given [employer]'s contact phone number and will call clinic back after speaking to them to update.

## 2021-02-01 ENCOUNTER — TELEPHONE (OUTPATIENT)
Dept: WOUND CARE | Age: 66
End: 2021-02-01

## 2021-02-01 ENCOUNTER — TELEPHONE (OUTPATIENT)
Dept: INFECTIOUS DISEASES | Age: 66
End: 2021-02-01

## 2021-02-01 ENCOUNTER — OFFICE VISIT (OUTPATIENT)
Dept: INFECTIOUS DISEASES | Age: 66
End: 2021-02-01
Payer: MEDICARE

## 2021-02-01 VITALS
BODY MASS INDEX: 20.45 KG/M2 | OXYGEN SATURATION: 97 % | TEMPERATURE: 97.7 F | WEIGHT: 155 LBS | DIASTOLIC BLOOD PRESSURE: 71 MMHG | HEART RATE: 94 BPM | SYSTOLIC BLOOD PRESSURE: 113 MMHG

## 2021-02-01 DIAGNOSIS — M86.172 ACUTE OSTEOMYELITIS OF LEFT FOOT (HCC): Primary | ICD-10-CM

## 2021-02-01 DIAGNOSIS — T81.89XD NON-HEALING SURGICAL WOUND, SUBSEQUENT ENCOUNTER: ICD-10-CM

## 2021-02-01 PROCEDURE — 99213 OFFICE O/P EST LOW 20 MIN: CPT | Performed by: INTERNAL MEDICINE

## 2021-02-01 RX ORDER — OXYCODONE HYDROCHLORIDE AND ACETAMINOPHEN 5; 325 MG/1; MG/1
TABLET ORAL
Status: ON HOLD | COMMUNITY
Start: 2021-01-28 | End: 2021-02-12 | Stop reason: SDUPTHER
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
## 2021-02-01 NOTE — PATIENT INSTRUCTIONS
Spoke to Yaa Mayorga at [facility] and gave verbal orders for IV Teflaro and faxed to [phone]. She will relay the telephone encounter to Nurse Colton Miller.  DLR

## 2021-02-01 NOTE — TELEPHONE ENCOUNTER
Spoke to Nurse Ranulfo Pink at [facility] to give orders and she will relay message to Nurse Jose Roberto Hutchins.
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
## 2021-02-02 ENCOUNTER — TELEPHONE (OUTPATIENT)
Dept: INFECTIOUS DISEASES | Age: 66
End: 2021-02-02

## 2021-02-02 DIAGNOSIS — M86.172 ACUTE OSTEOMYELITIS OF LEFT FOOT (HCC): Primary | ICD-10-CM

## 2021-02-02 NOTE — TELEPHONE ENCOUNTER
ON:  Grady Norwood   63year old male   1955  Is there another IV med aside on Teflaro? It is too expensive. $180 a day? I need to call Santos Mary from [facility] back [phone]. IF NO then they will but him back in the nursing home to get IV meds.  Please advise, Thanks, Keara Dejesus     2/2/2021 2:33 PM Cefepime1 gn q12hrs  And [facility] pharmacy to dose

## 2021-02-03 ENCOUNTER — APPOINTMENT (OUTPATIENT)
Dept: GENERAL RADIOLOGY | Age: 66
DRG: 872 | End: 2021-02-03
Payer: MEDICARE

## 2021-02-03 ENCOUNTER — HOSPITAL ENCOUNTER (INPATIENT)
Age: 66
LOS: 5 days | Discharge: ANOTHER HOSPITAL | DRG: 872 | End: 2021-02-08
Attending: EMERGENCY MEDICINE | Admitting: INTERNAL MEDICINE
Payer: MEDICARE

## 2021-02-03 ENCOUNTER — HOSPITAL ENCOUNTER (OUTPATIENT)
Dept: WOUND CARE | Age: 66
Discharge: HOME | DRG: 872 | End: 2021-02-03
Payer: MEDICARE

## 2021-02-03 VITALS
HEART RATE: 78 BPM | HEIGHT: 73 IN | WEIGHT: 155 LBS | RESPIRATION RATE: 18 BRPM | DIASTOLIC BLOOD PRESSURE: 76 MMHG | BODY MASS INDEX: 20.54 KG/M2 | SYSTOLIC BLOOD PRESSURE: 140 MMHG | TEMPERATURE: 97.8 F

## 2021-02-03 DIAGNOSIS — L97.424 DIABETIC ULCER OF LEFT MIDFOOT ASSOCIATED WITH TYPE 2 DIABETES MELLITUS, WITH NECROSIS OF BONE (HCC): Primary | ICD-10-CM

## 2021-02-03 DIAGNOSIS — A41.9 SEPTICEMIA (HCC): ICD-10-CM

## 2021-02-03 DIAGNOSIS — E11.621 DIABETIC ULCER OF LEFT MIDFOOT ASSOCIATED WITH TYPE 2 DIABETES MELLITUS, WITH NECROSIS OF BONE (HCC): Primary | ICD-10-CM

## 2021-02-03 DIAGNOSIS — E44.0 MODERATE PROTEIN MALNUTRITION (HCC): ICD-10-CM

## 2021-02-03 DIAGNOSIS — M86.672 CHRONIC OSTEOMYELITIS INVOLVING LEFT ANKLE AND FOOT (HCC): ICD-10-CM

## 2021-02-03 DIAGNOSIS — L97.522 FOOT ULCER WITH FAT LAYER EXPOSED, LEFT (HCC): ICD-10-CM

## 2021-02-03 DIAGNOSIS — Z89.511 HISTORY OF BELOW KNEE AMPUTATION, RIGHT (HCC): ICD-10-CM

## 2021-02-03 DIAGNOSIS — L08.9 SOFT TISSUE INFECTION: Primary | ICD-10-CM

## 2021-02-03 DIAGNOSIS — Z79.4 TYPE 2 DIABETES MELLITUS WITH DIABETIC POLYNEUROPATHY, WITH LONG-TERM CURRENT USE OF INSULIN (HCC): ICD-10-CM

## 2021-02-03 DIAGNOSIS — E11.42 TYPE 2 DIABETES MELLITUS WITH DIABETIC POLYNEUROPATHY, WITH LONG-TERM CURRENT USE OF INSULIN (HCC): ICD-10-CM

## 2021-02-03 LAB
ABSOLUTE EOS #: 0.13 K/UL (ref 0–0.4)
ABSOLUTE IMMATURE GRANULOCYTE: ABNORMAL K/UL (ref 0–0.3)
ABSOLUTE LYMPH #: 1.31 K/UL (ref 1–4.8)
ABSOLUTE MONO #: 0.52 K/UL (ref 0.1–1.3)
ANION GAP SERPL CALCULATED.3IONS-SCNC: 10 MMOL/L (ref 9–17)
BASOPHILS # BLD: 0 % (ref 0–2)
BASOPHILS ABSOLUTE: 0 K/UL (ref 0–0.2)
BUN BLDV-MCNC: 20 MG/DL (ref 8–23)
BUN/CREAT BLD: ABNORMAL (ref 9–20)
C-REACTIVE PROTEIN: 133 MG/L (ref 0–5)
CALCIUM SERPL-MCNC: 9.3 MG/DL (ref 8.6–10.4)
CHLORIDE BLD-SCNC: 96 MMOL/L (ref 98–107)
CO2: 25 MMOL/L (ref 20–31)
CREAT SERPL-MCNC: 0.96 MG/DL (ref 0.7–1.2)
DIFFERENTIAL TYPE: ABNORMAL
EOSINOPHILS RELATIVE PERCENT: 1 % (ref 0–4)
GFR AFRICAN AMERICAN: >60 ML/MIN
GFR NON-AFRICAN AMERICAN: >60 ML/MIN
GFR SERPL CREATININE-BSD FRML MDRD: ABNORMAL ML/MIN/{1.73_M2}
GFR SERPL CREATININE-BSD FRML MDRD: ABNORMAL ML/MIN/{1.73_M2}
GLUCOSE BLD-MCNC: 447 MG/DL (ref 70–99)
HCT VFR BLD CALC: 31.5 % (ref 41–53)
HEMOGLOBIN: 9.7 G/DL (ref 13.5–17.5)
IMMATURE GRANULOCYTES: ABNORMAL %
LACTIC ACID: 1.2 MMOL/L (ref 0.5–2.2)
LACTIC ACID: 3.1 MMOL/L (ref 0.5–2.2)
LYMPHOCYTES # BLD: 10 % (ref 24–44)
MAGNESIUM: 2 MG/DL (ref 1.6–2.6)
MCH RBC QN AUTO: 25.9 PG (ref 26–34)
MCHC RBC AUTO-ENTMCNC: 30.8 G/DL (ref 31–37)
MCV RBC AUTO: 84 FL (ref 80–100)
MONOCYTES # BLD: 4 % (ref 1–7)
MORPHOLOGY: ABNORMAL
MORPHOLOGY: ABNORMAL
NRBC AUTOMATED: ABNORMAL PER 100 WBC
PDW BLD-RTO: 16.3 % (ref 11.5–14.9)
PLATELET # BLD: 490 K/UL (ref 150–450)
PLATELET ESTIMATE: ABNORMAL
PMV BLD AUTO: 7.7 FL (ref 6–12)
POTASSIUM SERPL-SCNC: 4.7 MMOL/L (ref 3.7–5.3)
RBC # BLD: 3.75 M/UL (ref 4.5–5.9)
RBC # BLD: ABNORMAL 10*6/UL
SEDIMENTATION RATE, ERYTHROCYTE: 36 MM (ref 0–20)
SEG NEUTROPHILS: 85 % (ref 36–66)
SEGMENTED NEUTROPHILS ABSOLUTE COUNT: 11.14 K/UL (ref 1.3–9.1)
SODIUM BLD-SCNC: 131 MMOL/L (ref 135–144)
WBC # BLD: 13.1 K/UL (ref 3.5–11)
WBC # BLD: ABNORMAL 10*3/UL

## 2021-02-03 PROCEDURE — 2060000000 HC ICU INTERMEDIATE R&B

## 2021-02-03 PROCEDURE — 85025 COMPLETE CBC W/AUTO DIFF WBC: CPT

## 2021-02-03 PROCEDURE — 6360000002 HC RX W HCPCS: Performed by: EMERGENCY MEDICINE

## 2021-02-03 PROCEDURE — 87040 BLOOD CULTURE FOR BACTERIA: CPT

## 2021-02-03 PROCEDURE — 73630 X-RAY EXAM OF FOOT: CPT

## 2021-02-03 PROCEDURE — 83735 ASSAY OF MAGNESIUM: CPT

## 2021-02-03 PROCEDURE — 86403 PARTICLE AGGLUT ANTBDY SCRN: CPT

## 2021-02-03 PROCEDURE — 87186 SC STD MICRODIL/AGAR DIL: CPT

## 2021-02-03 PROCEDURE — 6370000000 HC RX 637 (ALT 250 FOR IP): Performed by: EMERGENCY MEDICINE

## 2021-02-03 PROCEDURE — 86140 C-REACTIVE PROTEIN: CPT

## 2021-02-03 PROCEDURE — 36415 COLL VENOUS BLD VENIPUNCTURE: CPT

## 2021-02-03 PROCEDURE — 2580000003 HC RX 258: Performed by: EMERGENCY MEDICINE

## 2021-02-03 PROCEDURE — 85652 RBC SED RATE AUTOMATED: CPT

## 2021-02-03 PROCEDURE — 99213 OFFICE O/P EST LOW 20 MIN: CPT

## 2021-02-03 PROCEDURE — 80048 BASIC METABOLIC PNL TOTAL CA: CPT

## 2021-02-03 PROCEDURE — 87205 SMEAR GRAM STAIN: CPT

## 2021-02-03 PROCEDURE — 83605 ASSAY OF LACTIC ACID: CPT

## 2021-02-03 PROCEDURE — 99284 EMERGENCY DEPT VISIT MOD MDM: CPT

## 2021-02-03 RX ORDER — BUDESONIDE AND FORMOTEROL FUMARATE DIHYDRATE 160; 4.5 UG/1; UG/1
2 AEROSOL RESPIRATORY (INHALATION) 2 TIMES DAILY
Status: DISCONTINUED | OUTPATIENT
Start: 2021-02-04 | End: 2021-02-08 | Stop reason: HOSPADM

## 2021-02-03 RX ORDER — LACTOBACILLUS RHAMNOSUS GG 10B CELL
1 CAPSULE ORAL 2 TIMES DAILY
Status: DISCONTINUED | OUTPATIENT
Start: 2021-02-04 | End: 2021-02-08 | Stop reason: HOSPADM

## 2021-02-03 RX ORDER — OXYCODONE HYDROCHLORIDE AND ACETAMINOPHEN 5; 325 MG/1; MG/1
1 TABLET ORAL EVERY 6 HOURS PRN
Status: DISCONTINUED | OUTPATIENT
Start: 2021-02-03 | End: 2021-02-08 | Stop reason: HOSPADM

## 2021-02-03 RX ORDER — SODIUM CHLORIDE 0.9 % (FLUSH) 0.9 %
10 SYRINGE (ML) INJECTION EVERY 12 HOURS SCHEDULED
Status: DISCONTINUED | OUTPATIENT
Start: 2021-02-04 | End: 2021-02-08 | Stop reason: HOSPADM

## 2021-02-03 RX ORDER — LIDOCAINE HYDROCHLORIDE 40 MG/ML
SOLUTION TOPICAL ONCE
Status: COMPLETED | OUTPATIENT
Start: 2021-02-03 | End: 2021-02-03

## 2021-02-03 RX ORDER — INSULIN GLARGINE 100 [IU]/ML
25 INJECTION, SOLUTION SUBCUTANEOUS 2 TIMES DAILY
Status: DISCONTINUED | OUTPATIENT
Start: 2021-02-04 | End: 2021-02-08 | Stop reason: HOSPADM

## 2021-02-03 RX ORDER — ACETAMINOPHEN 325 MG/1
650 TABLET ORAL EVERY 6 HOURS PRN
Status: DISCONTINUED | OUTPATIENT
Start: 2021-02-03 | End: 2021-02-08 | Stop reason: HOSPADM

## 2021-02-03 RX ORDER — FAMOTIDINE 20 MG/1
20 TABLET, FILM COATED ORAL 2 TIMES DAILY
COMMUNITY
End: 2021-09-24 | Stop reason: SDUPTHER

## 2021-02-03 RX ORDER — 0.9 % SODIUM CHLORIDE 0.9 %
1000 INTRAVENOUS SOLUTION INTRAVENOUS ONCE
Status: COMPLETED | OUTPATIENT
Start: 2021-02-03 | End: 2021-02-03

## 2021-02-03 RX ORDER — FAMOTIDINE 20 MG/1
20 TABLET, FILM COATED ORAL 2 TIMES DAILY
Status: DISCONTINUED | OUTPATIENT
Start: 2021-02-04 | End: 2021-02-08 | Stop reason: HOSPADM

## 2021-02-03 RX ORDER — LIDOCAINE HYDROCHLORIDE 20 MG/ML
JELLY TOPICAL ONCE
Status: CANCELLED | OUTPATIENT
Start: 2021-02-03 | End: 2021-02-03

## 2021-02-03 RX ORDER — SODIUM CHLORIDE 9 MG/ML
INJECTION, SOLUTION INTRAVENOUS CONTINUOUS
Status: DISCONTINUED | OUTPATIENT
Start: 2021-02-04 | End: 2021-02-08 | Stop reason: HOSPADM

## 2021-02-03 RX ORDER — LIDOCAINE 40 MG/G
CREAM TOPICAL ONCE
Status: CANCELLED | OUTPATIENT
Start: 2021-02-03 | End: 2021-02-03

## 2021-02-03 RX ORDER — LIDOCAINE 50 MG/G
OINTMENT TOPICAL ONCE
Status: CANCELLED | OUTPATIENT
Start: 2021-02-03 | End: 2021-02-03

## 2021-02-03 RX ORDER — MORPHINE SULFATE 4 MG/ML
4 INJECTION, SOLUTION INTRAMUSCULAR; INTRAVENOUS EVERY 4 HOURS PRN
Status: DISCONTINUED | OUTPATIENT
Start: 2021-02-03 | End: 2021-02-08 | Stop reason: HOSPADM

## 2021-02-03 RX ORDER — LIDOCAINE HYDROCHLORIDE 40 MG/ML
SOLUTION TOPICAL ONCE
Status: CANCELLED | OUTPATIENT
Start: 2021-02-03 | End: 2021-02-03

## 2021-02-03 RX ORDER — SODIUM CHLORIDE 0.9 % (FLUSH) 0.9 %
10 SYRINGE (ML) INJECTION PRN
Status: DISCONTINUED | OUTPATIENT
Start: 2021-02-03 | End: 2021-02-08 | Stop reason: HOSPADM

## 2021-02-03 RX ORDER — ACETAMINOPHEN 650 MG/1
650 SUPPOSITORY RECTAL EVERY 6 HOURS PRN
Status: DISCONTINUED | OUTPATIENT
Start: 2021-02-03 | End: 2021-02-08 | Stop reason: HOSPADM

## 2021-02-03 RX ORDER — 0.9 % SODIUM CHLORIDE 0.9 %
1000 INTRAVENOUS SOLUTION INTRAVENOUS ONCE
Status: COMPLETED | OUTPATIENT
Start: 2021-02-03 | End: 2021-02-04

## 2021-02-03 RX ORDER — MORPHINE SULFATE 2 MG/ML
2 INJECTION, SOLUTION INTRAMUSCULAR; INTRAVENOUS EVERY 4 HOURS PRN
Status: DISCONTINUED | OUTPATIENT
Start: 2021-02-03 | End: 2021-02-08 | Stop reason: HOSPADM

## 2021-02-03 RX ADMIN — SODIUM CHLORIDE 1000 ML: 9 INJECTION, SOLUTION INTRAVENOUS at 22:37

## 2021-02-03 RX ADMIN — INSULIN LISPRO 6 UNITS: 100 INJECTION, SOLUTION INTRAVENOUS; SUBCUTANEOUS at 22:30

## 2021-02-03 RX ADMIN — VANCOMYCIN HYDROCHLORIDE 1750 MG: 1.25 INJECTION, POWDER, LYOPHILIZED, FOR SOLUTION INTRAVENOUS at 22:37

## 2021-02-03 RX ADMIN — SODIUM CHLORIDE 1000 ML: 9 INJECTION, SOLUTION INTRAVENOUS at 21:02

## 2021-02-03 RX ADMIN — LIDOCAINE HYDROCHLORIDE: 40 SOLUTION TOPICAL at 13:52

## 2021-02-03 RX ADMIN — CEFEPIME 2000 MG: 2 INJECTION, POWDER, FOR SOLUTION INTRAVENOUS at 21:02

## 2021-02-03 ASSESSMENT — ENCOUNTER SYMPTOMS
VOMITING: 0
DIARRHEA: 0
COUGH: 0
VOMITING: 0
ABDOMINAL PAIN: 0
NAUSEA: 0
DIARRHEA: 0
SHORTNESS OF BREATH: 0
BACK PAIN: 0

## 2021-02-03 ASSESSMENT — PAIN DESCRIPTION - ORIENTATION: ORIENTATION: LEFT

## 2021-02-03 ASSESSMENT — PAIN DESCRIPTION - FREQUENCY: FREQUENCY: CONTINUOUS

## 2021-02-03 ASSESSMENT — PAIN DESCRIPTION - PAIN TYPE: TYPE: CHRONIC PAIN

## 2021-02-03 NOTE — PROGRESS NOTES
Pt taken to ER by staff to be evaluated and start IV antibiotics. Staff member walking past with another patient heard patient tell security that he was going to go outside and move his car and that he would be back in a little while. Pt has not yet returned to be seen in the ER.
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
## 2021-02-04 ENCOUNTER — APPOINTMENT (OUTPATIENT)
Dept: MRI IMAGING | Age: 66
DRG: 872 | End: 2021-02-04
Payer: MEDICARE

## 2021-02-04 LAB
ALBUMIN SERPL-MCNC: 2.7 G/DL (ref 3.5–5.2)
ALBUMIN/GLOBULIN RATIO: ABNORMAL (ref 1–2.5)
ALP BLD-CCNC: 102 U/L (ref 40–129)
ALT SERPL-CCNC: 5 U/L (ref 5–41)
ANION GAP SERPL CALCULATED.3IONS-SCNC: 9 MMOL/L (ref 9–17)
AST SERPL-CCNC: 7 U/L
BILIRUB SERPL-MCNC: <0.15 MG/DL (ref 0.3–1.2)
BUN BLDV-MCNC: 18 MG/DL (ref 8–23)
BUN/CREAT BLD: ABNORMAL (ref 9–20)
CALCIUM IONIZED: 1.22 MMOL/L (ref 1.13–1.33)
CALCIUM SERPL-MCNC: 8.6 MG/DL (ref 8.6–10.4)
CHLORIDE BLD-SCNC: 102 MMOL/L (ref 98–107)
CO2: 25 MMOL/L (ref 20–31)
CREAT SERPL-MCNC: 0.86 MG/DL (ref 0.7–1.2)
GFR AFRICAN AMERICAN: >60 ML/MIN
GFR NON-AFRICAN AMERICAN: >60 ML/MIN
GFR SERPL CREATININE-BSD FRML MDRD: ABNORMAL ML/MIN/{1.73_M2}
GFR SERPL CREATININE-BSD FRML MDRD: ABNORMAL ML/MIN/{1.73_M2}
GLUCOSE BLD-MCNC: 183 MG/DL (ref 75–110)
GLUCOSE BLD-MCNC: 212 MG/DL (ref 75–110)
GLUCOSE BLD-MCNC: 212 MG/DL (ref 75–110)
GLUCOSE BLD-MCNC: 297 MG/DL (ref 75–110)
GLUCOSE BLD-MCNC: 353 MG/DL (ref 75–110)
GLUCOSE BLD-MCNC: 381 MG/DL (ref 70–99)
GLUCOSE BLD-MCNC: 399 MG/DL (ref 75–110)
HCT VFR BLD CALC: 28 % (ref 41–53)
HEMOGLOBIN: 8.9 G/DL (ref 13.5–17.5)
INR BLD: 1
MAGNESIUM: 1.9 MG/DL (ref 1.6–2.6)
MCH RBC QN AUTO: 26.4 PG (ref 26–34)
MCHC RBC AUTO-ENTMCNC: 31.9 G/DL (ref 31–37)
MCV RBC AUTO: 82.6 FL (ref 80–100)
NRBC AUTOMATED: ABNORMAL PER 100 WBC
PDW BLD-RTO: 16.5 % (ref 11.5–14.9)
PHOSPHORUS: 3.3 MG/DL (ref 2.5–4.5)
PLATELET # BLD: 451 K/UL (ref 150–450)
PMV BLD AUTO: 7.6 FL (ref 6–12)
POTASSIUM SERPL-SCNC: 4.4 MMOL/L (ref 3.7–5.3)
PREALBUMIN: 7.2 MG/DL (ref 20–40)
PROTHROMBIN TIME: 13.5 SEC (ref 11.8–14.6)
RBC # BLD: 3.39 M/UL (ref 4.5–5.9)
SARS-COV-2, RAPID: NOT DETECTED
SARS-COV-2: NORMAL
SARS-COV-2: NORMAL
SODIUM BLD-SCNC: 136 MMOL/L (ref 135–144)
SOURCE: NORMAL
TOTAL PROTEIN: 6.7 G/DL (ref 6.4–8.3)
WBC # BLD: 9.5 K/UL (ref 3.5–11)

## 2021-02-04 PROCEDURE — 6370000000 HC RX 637 (ALT 250 FOR IP): Performed by: INTERNAL MEDICINE

## 2021-02-04 PROCEDURE — 82330 ASSAY OF CALCIUM: CPT

## 2021-02-04 PROCEDURE — 82947 ASSAY GLUCOSE BLOOD QUANT: CPT

## 2021-02-04 PROCEDURE — 73720 MRI LWR EXTREMITY W/O&W/DYE: CPT

## 2021-02-04 PROCEDURE — 6360000004 HC RX CONTRAST MEDICATION: Performed by: STUDENT IN AN ORGANIZED HEALTH CARE EDUCATION/TRAINING PROGRAM

## 2021-02-04 PROCEDURE — 6360000002 HC RX W HCPCS: Performed by: EMERGENCY MEDICINE

## 2021-02-04 PROCEDURE — 85610 PROTHROMBIN TIME: CPT

## 2021-02-04 PROCEDURE — 2580000003 HC RX 258: Performed by: STUDENT IN AN ORGANIZED HEALTH CARE EDUCATION/TRAINING PROGRAM

## 2021-02-04 PROCEDURE — 2060000000 HC ICU INTERMEDIATE R&B

## 2021-02-04 PROCEDURE — 84100 ASSAY OF PHOSPHORUS: CPT

## 2021-02-04 PROCEDURE — 85027 COMPLETE CBC AUTOMATED: CPT

## 2021-02-04 PROCEDURE — A9579 GAD-BASE MR CONTRAST NOS,1ML: HCPCS | Performed by: STUDENT IN AN ORGANIZED HEALTH CARE EDUCATION/TRAINING PROGRAM

## 2021-02-04 PROCEDURE — 87205 SMEAR GRAM STAIN: CPT

## 2021-02-04 PROCEDURE — 6370000000 HC RX 637 (ALT 250 FOR IP): Performed by: NURSE PRACTITIONER

## 2021-02-04 PROCEDURE — 99223 1ST HOSP IP/OBS HIGH 75: CPT | Performed by: INTERNAL MEDICINE

## 2021-02-04 PROCEDURE — 36415 COLL VENOUS BLD VENIPUNCTURE: CPT

## 2021-02-04 PROCEDURE — 84134 ASSAY OF PREALBUMIN: CPT

## 2021-02-04 PROCEDURE — 2580000003 HC RX 258: Performed by: NURSE PRACTITIONER

## 2021-02-04 PROCEDURE — 83735 ASSAY OF MAGNESIUM: CPT

## 2021-02-04 PROCEDURE — 87070 CULTURE OTHR SPECIMN AEROBIC: CPT

## 2021-02-04 PROCEDURE — 6360000002 HC RX W HCPCS: Performed by: NURSE PRACTITIONER

## 2021-02-04 PROCEDURE — U0002 COVID-19 LAB TEST NON-CDC: HCPCS

## 2021-02-04 PROCEDURE — 86403 PARTICLE AGGLUT ANTBDY SCRN: CPT

## 2021-02-04 PROCEDURE — 2580000003 HC RX 258: Performed by: EMERGENCY MEDICINE

## 2021-02-04 PROCEDURE — 87075 CULTR BACTERIA EXCEPT BLOOD: CPT

## 2021-02-04 PROCEDURE — 80053 COMPREHEN METABOLIC PANEL: CPT

## 2021-02-04 RX ORDER — NICOTINE POLACRILEX 4 MG
15 LOZENGE BUCCAL PRN
Status: DISCONTINUED | OUTPATIENT
Start: 2021-02-04 | End: 2021-02-08 | Stop reason: HOSPADM

## 2021-02-04 RX ORDER — SODIUM HYPOCHLORITE 1.25 MG/ML
SOLUTION TOPICAL DAILY
Status: DISCONTINUED | OUTPATIENT
Start: 2021-02-04 | End: 2021-02-08 | Stop reason: HOSPADM

## 2021-02-04 RX ORDER — ALPRAZOLAM 1 MG/1
1 TABLET ORAL
Status: COMPLETED | OUTPATIENT
Start: 2021-02-04 | End: 2021-02-04

## 2021-02-04 RX ORDER — DEXTROSE MONOHYDRATE 25 G/50ML
12.5 INJECTION, SOLUTION INTRAVENOUS PRN
Status: DISCONTINUED | OUTPATIENT
Start: 2021-02-04 | End: 2021-02-08 | Stop reason: HOSPADM

## 2021-02-04 RX ORDER — SODIUM CHLORIDE 0.9 % (FLUSH) 0.9 %
10 SYRINGE (ML) INJECTION PRN
Status: DISCONTINUED | OUTPATIENT
Start: 2021-02-04 | End: 2021-02-08 | Stop reason: HOSPADM

## 2021-02-04 RX ORDER — ONDANSETRON 2 MG/ML
4 INJECTION INTRAMUSCULAR; INTRAVENOUS EVERY 6 HOURS PRN
Status: DISCONTINUED | OUTPATIENT
Start: 2021-02-04 | End: 2021-02-08 | Stop reason: HOSPADM

## 2021-02-04 RX ORDER — DEXTROSE MONOHYDRATE 50 MG/ML
100 INJECTION, SOLUTION INTRAVENOUS PRN
Status: DISCONTINUED | OUTPATIENT
Start: 2021-02-04 | End: 2021-02-08 | Stop reason: HOSPADM

## 2021-02-04 RX ADMIN — CEFEPIME 2000 MG: 2 INJECTION, POWDER, FOR SOLUTION INTRAVENOUS at 05:04

## 2021-02-04 RX ADMIN — Medication 10 ML: at 15:40

## 2021-02-04 RX ADMIN — FAMOTIDINE 20 MG: 20 TABLET ORAL at 09:22

## 2021-02-04 RX ADMIN — Medication 1 CAPSULE: at 21:43

## 2021-02-04 RX ADMIN — SODIUM CHLORIDE: 9 INJECTION, SOLUTION INTRAVENOUS at 00:00

## 2021-02-04 RX ADMIN — INSULIN LISPRO 4 UNITS: 100 INJECTION, SOLUTION INTRAVENOUS; SUBCUTANEOUS at 09:23

## 2021-02-04 RX ADMIN — SODIUM CHLORIDE, PRESERVATIVE FREE 10 ML: 5 INJECTION INTRAVENOUS at 09:24

## 2021-02-04 RX ADMIN — INSULIN GLARGINE 25 UNITS: 100 INJECTION, SOLUTION SUBCUTANEOUS at 09:22

## 2021-02-04 RX ADMIN — BUDESONIDE AND FORMOTEROL FUMARATE DIHYDRATE 2 PUFF: 160; 4.5 AEROSOL RESPIRATORY (INHALATION) at 00:22

## 2021-02-04 RX ADMIN — MORPHINE SULFATE 4 MG: 4 INJECTION, SOLUTION INTRAMUSCULAR; INTRAVENOUS at 09:22

## 2021-02-04 RX ADMIN — VANCOMYCIN HYDROCHLORIDE 1000 MG: 1 INJECTION, POWDER, LYOPHILIZED, FOR SOLUTION INTRAVENOUS at 09:35

## 2021-02-04 RX ADMIN — Medication 1 CAPSULE: at 00:01

## 2021-02-04 RX ADMIN — OXYCODONE HYDROCHLORIDE AND ACETAMINOPHEN 1 TABLET: 5; 325 TABLET ORAL at 23:24

## 2021-02-04 RX ADMIN — MORPHINE SULFATE 4 MG: 4 INJECTION, SOLUTION INTRAMUSCULAR; INTRAVENOUS at 00:01

## 2021-02-04 RX ADMIN — CEFEPIME 2000 MG: 2 INJECTION, POWDER, FOR SOLUTION INTRAVENOUS at 23:24

## 2021-02-04 RX ADMIN — INSULIN LISPRO 5 UNITS: 100 INJECTION, SOLUTION INTRAVENOUS; SUBCUTANEOUS at 03:44

## 2021-02-04 RX ADMIN — SODIUM CHLORIDE, PRESERVATIVE FREE 10 ML: 5 INJECTION INTRAVENOUS at 21:47

## 2021-02-04 RX ADMIN — Medication 1 CAPSULE: at 09:22

## 2021-02-04 RX ADMIN — INSULIN LISPRO 2 UNITS: 100 INJECTION, SOLUTION INTRAVENOUS; SUBCUTANEOUS at 18:17

## 2021-02-04 RX ADMIN — VANCOMYCIN HYDROCHLORIDE 1000 MG: 1 INJECTION, POWDER, LYOPHILIZED, FOR SOLUTION INTRAVENOUS at 21:43

## 2021-02-04 RX ADMIN — MORPHINE SULFATE 4 MG: 4 INJECTION, SOLUTION INTRAMUSCULAR; INTRAVENOUS at 05:12

## 2021-02-04 RX ADMIN — OXYCODONE HYDROCHLORIDE AND ACETAMINOPHEN 1 TABLET: 5; 325 TABLET ORAL at 13:15

## 2021-02-04 RX ADMIN — ALPRAZOLAM 1 MG: 1 TABLET ORAL at 14:00

## 2021-02-04 RX ADMIN — SODIUM CHLORIDE: 9 INJECTION, SOLUTION INTRAVENOUS at 21:41

## 2021-02-04 RX ADMIN — INSULIN LISPRO 4 UNITS: 100 INJECTION, SOLUTION INTRAVENOUS; SUBCUTANEOUS at 11:44

## 2021-02-04 RX ADMIN — FAMOTIDINE 20 MG: 20 TABLET ORAL at 00:01

## 2021-02-04 RX ADMIN — BUDESONIDE AND FORMOTEROL FUMARATE DIHYDRATE 2 PUFF: 160; 4.5 AEROSOL RESPIRATORY (INHALATION) at 09:21

## 2021-02-04 RX ADMIN — INSULIN LISPRO 3 UNITS: 100 INJECTION, SOLUTION INTRAVENOUS; SUBCUTANEOUS at 21:43

## 2021-02-04 RX ADMIN — FAMOTIDINE 20 MG: 20 TABLET ORAL at 21:43

## 2021-02-04 RX ADMIN — INSULIN GLARGINE 25 UNITS: 100 INJECTION, SOLUTION SUBCUTANEOUS at 21:43

## 2021-02-04 RX ADMIN — CEFEPIME 2000 MG: 2 INJECTION, POWDER, FOR SOLUTION INTRAVENOUS at 13:47

## 2021-02-04 RX ADMIN — INSULIN GLARGINE 25 UNITS: 100 INJECTION, SOLUTION SUBCUTANEOUS at 00:22

## 2021-02-04 RX ADMIN — BUDESONIDE AND FORMOTEROL FUMARATE DIHYDRATE 2 PUFF: 160; 4.5 AEROSOL RESPIRATORY (INHALATION) at 23:24

## 2021-02-04 RX ADMIN — GADOTERIDOL 15 ML: 279.3 INJECTION, SOLUTION INTRAVENOUS at 15:39

## 2021-02-04 RX ADMIN — SODIUM CHLORIDE: 9 INJECTION, SOLUTION INTRAVENOUS at 09:35

## 2021-02-04 ASSESSMENT — PAIN SCALES - GENERAL
PAINLEVEL_OUTOF10: 8
PAINLEVEL_OUTOF10: 8
PAINLEVEL_OUTOF10: 7
PAINLEVEL_OUTOF10: 8
PAINLEVEL_OUTOF10: 6

## 2021-02-04 ASSESSMENT — ENCOUNTER SYMPTOMS
CONSTIPATION: 0
COUGH: 0
NAUSEA: 0
ABDOMINAL PAIN: 0
COLOR CHANGE: 0
WHEEZING: 0
BACK PAIN: 0
NAUSEA: 1
DIARRHEA: 0
VOMITING: 0
SORE THROAT: 0
SHORTNESS OF BREATH: 0

## 2021-02-04 ASSESSMENT — PAIN DESCRIPTION - LOCATION: LOCATION: FOOT

## 2021-02-04 ASSESSMENT — PAIN DESCRIPTION - ORIENTATION: ORIENTATION: LEFT

## 2021-02-04 NOTE — PROGRESS NOTES
RN called and spoke with MRI regarding patient's order. RN updated MRI that patient would need medicated prior to scan and ordered meds are PO. RN was informed that MRI would be later today. RN paged and spoke with Dr. Yoselyn Bahena regarding patient's complaints of claustrophobia and intermittent nausea. Orders received for xanax before MRI and MD states will place orders for patient shortly.
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
## 2021-02-04 NOTE — CARE COORDINATION
LAVELL located a list of Skilled Nursing Facilities that are in network with this patient's insurance. SW will provide this to the patient so that he can chose a facility. LAVELL spoke to [facility] and they will not accept this patient back.

## 2021-02-04 NOTE — CONSULTS
Case discussed with nursing. Patient is known to the vascular service and has been in multiple hospitals leaving [facility] and noncompliant. Recently evaluated at [facility] for a left below-knee amputation. Has a known foot wound and wanted to try and salvage the foot and was planning on undergoing hyperbaric oxygen therapy and local wound care. Recommend podiatry evaluation. If foot is felt to be nonsalvageable then we will schedule elective outpatient below-knee amputation.

## 2021-02-04 NOTE — PROGRESS NOTES
RN messaged Podiatry resident to notify that MRI results are back. Also to update that vascular would not be seeing patient in current admission. MD states that if foot is not salvageable then patient would need outpatient amputation. Will await response.

## 2021-02-04 NOTE — PLAN OF CARE
Nutrition Problem #1: Increased nutrient needs  Intervention: Food and/or Nutrient Delivery: Continue Current Diet, Start Oral Nutrition Supplement  Nutritional Goals: Meet % of nutrient needs with PO diet and supplements.
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
## 2021-02-04 NOTE — CARE COORDINATION
CASE MANAGEMENT NOTE:    Admission Date:  2/3/2021 Leonel Stern is a 63 y.o.  male    Admitted for : Sepsis ([address] [A41.9]    Met with:  Patient    PCP:  Dr. Denton Stahl:  [place]:  independently at home             Current Services PTA:  Yes    Is patient agreeable to VNS: Yes    Freedom of choice provided:  Yes    List of [address] provided: Yes    VNS chosen:  Yes    DME:  walker    Home Oxygen: No    Nebulizer: No    CPAP/BIPAP: No    Supplier: N/A    Potential Assistance Needed: No    SNF needed: No    Freedom of choice and list provided: NA    Pharmacy:  [facility] [address]        Does Patient want to use MEDS to BEDS? No    Is patient currently receiving oral anticoagulation therapy? Yes    Is the Patient an SHUBHAM VARGHESE [facility] with Readmission Risk Score greater than 14%? No  If yes, pt needs a follow up appointment made within 7 days. Family Members/Caregivers that pt would like involved in their care:    Yes    If yes, list name here:  Cara Singh8 Provider:  Family             Is patient in Isolation/One on One/Altered Mental Status? No  If yes, skip next question. If no, would they like an I-Pad to  use? No  If yes, call [phone]. Discharge Plan:  2/4/21 Bart Issa Pt is from home in a one story home he uses a walker and is supposed to have a wound vac that he does not use pt states that he is current with [facility] and he would like to go to a snf but not the one he was just at will have LSW follow for placement.//tv                   Electronically signed by:  Shanae Yoo RN on 2/4/2021 at 12:04 PM

## 2021-02-04 NOTE — PROGRESS NOTES
Pharmacy Note  Vancomycin Consult    Carmen Bland is a 63 y.o. male ordered Vancomycin for osteomyelitis; consult received from Dr. Parth Varela to manage therapy. Also receiving cefepime.     Patient Active Problem List   Diagnosis    Type 2 diabetes mellitus with diabetic polyneuropathy, with long-term current use of insulin (HCC)    Neuropathic pain, leg    Diabetic polyneuropathy (HCC)    Allergic rhinitis    Tobacco dependence    Edentulous    Dysphagia    Lung nodules    Esophageal cancer (HCC)    Fracture of humerus, left, closed    Closed fracture of humerus    DM type 2 with diabetic peripheral neuropathy (HCC)    Chronic obstructive pulmonary disease (HCC)    HLD (hyperlipidemia)    Gastroesophageal reflux disease    Chronic pain syndrome    Marijuana use    History of colon polyps    Functional diarrhea    Sepsis due to methicillin resistant Staphylococcus aureus (MRSA) (HCC)    History of esophageal cancer    Osteomyelitis, chronic, ankle or foot ([address]    PAD (peripheral artery disease) ([address]    Other pulmonary embolism without acute cor pulmonale (HCC)    Carotid stenosis, asymptomatic, bilateral    Lower limb amputation status    Fx humeral neck, right, closed, initial encounter    Transient hypotension    Constipation due to opioid therapy    Acute kidney injury ([address]    Diabetic ulcer of left midfoot associated with type 2 diabetes mellitus, with fat layer exposed ([address]    Complication of below knee amputation stump (HCC)    COPD exacerbation (HCC)    Hyponatremia    Pain, phantom limb ([address]    Below-knee amputation of right lower extremity (HCC)    Moderate protein malnutrition ([address]    Essential hypertension    Uncontrolled type 2 diabetes mellitus with hyperglycemia ([address]    History of pulmonary embolism - 2017    Atelectasis    Uncontrolled type 2 diabetes mellitus with foot ulcer (HCC)    Azotemia    Anemia, normocytic normochromic    Drug-induced constipation    Osteomyelitis of metatarsals of left foot ([address]    Diabetic foot infection ([address]    Noncompliance    Type 1 diabetes mellitus with diabetic foot infection ([address]    Acute osteomyelitis of left foot ([address]    Cellulitis of left foot    Mild malnutrition ([address]    Diabetic infection of left foot ([address]    Hypocalcemia    Hypokalemia    Moderate malnutrition ([address]    Diabetic ulcer of left midfoot associated with type 2 diabetes mellitus, with necrosis of bone ([address]    History of below knee amputation, right ([address]    Foot ulcer with fat layer exposed, left ([address]    Chronic refractory osteomyelitis of left foot (HCC)       Allergies:  Gabapentin     Temp max: 0.96    Recent Labs     02/03/21 2035   BUN 20       Recent Labs     02/03/21 2035   CREATININE 0.96       Recent Labs     02/03/21 2035   WBC 13.1*       No intake or output data in the 24 hours ending 02/03/21 2113    Culture Date      Source                       Results  Pending  12/31/20               Wound          Streptococci, Beta hemolytic group B, Escherichia Coli     Ht Readings from Last 1 Encounters:   02/03/21 6' 1\" (1.854 m)        Wt Readings from Last 1 Encounters:   02/03/21 155 lb (70.3 kg)         Estimated Creatinine Clearance: 78 mL/min (based on SCr of 0.96 mg/dL). GOAL TROUGH: 15 - 20    Assessment/Plan:  Will initiate one time vancomycin loading dose of 1750 mg followed by vancomycin 1000 mg IV every 12 hours. Timing of trough level will be determined based on culture results, renal function, and clinical response. Thank you for the consult. Will continue to follow. Skye Bryant, PharmD.  [facility]) 2/3/2021 9:16 PM

## 2021-02-04 NOTE — PROGRESS NOTES
Comprehensive Nutrition Assessment    Type and Reason for Visit:  Positive Nutrition Screen(weight loss and foot ulcer)    Nutrition Recommendations/Plan: Continue diet as ordered. Provide Glucerna each meal.    Nutrition Assessment:  Pt states \"I eat but don't gain any weight\", writer asked if he would like a nutritional supplement. Pt said he would like a supplement but needs to be low in sugar, writer indicated would send Glucerna. Hx indicates pt had to leave ECF 2 weeks ago related to insurance issues ans since home hasn't had wound vac on. Physician's have recommended left BKA which pt adamently refuses. Malnutrition Assessment:  Malnutrition Status:  No malnutrition    Context:  Acute Illness     Findings of the 6 clinical characteristics of malnutrition:  Energy Intake:  No significant decrease in energy intake  Weight Loss:  No significant weight loss     Body Fat Loss:  No significant body fat loss     Muscle Mass Loss:  No significant muscle mass loss    Fluid Accumulation:  No significant fluid accumulation     Strength:  Not Performed    Estimated Daily Nutrient Needs:  Energy (kcal):  2100 kcal based on 30 kcal/kg using adm wt 70 kg; Weight Used for Energy Requirements:  Admission     Protein (g):   gm protein based on 1.3-1.5 gm/kg using adm wt; Weight Used for Protein Requirements:  Admission          Nutrition Related Findings:  Edema: +1 LLE (where ulcer is)      Wounds:  Diabetic Ulcer       Current Nutrition Therapies:    DIET CARB CONTROL; Anthropometric Measures:  · Height: 6' 1\" (185.4 cm)  · Current Body Weight: 155 lb (70.3 kg)   · Admission Body Weight: 155 lb (70.3 kg)    · Usual Body Weight: 162 lb (73.5 kg)(11-3-20)     · Ideal Body Weight: 184 lbs; % Ideal Body Weight 84.2 %   · BMI: 20.5  · BMI Categories: Normal Weight (BMI 18.5-24. 9)       Nutrition Diagnosis:   · Increased nutrient needs related to other (comment)(Healing) as evidenced by wounds      Nutrition Interventions:   Food and/or Nutrient Delivery:  Continue Current Diet, Start Oral Nutrition Supplement  Nutrition Education/Counseling:  No recommendation at this time   Coordination of Nutrition Care:  Continue to monitor while inpatient    Goals:  Meet % of nutrient needs with PO diet and supplements. Nutrition Monitoring and Evaluation:   Food/Nutrient Intake Outcomes:  Food and Nutrient Intake, Supplement Intake  Physical Signs/Symptoms Outcomes:  Biochemical Data, Fluid Status or Edema, Skin, Weight     Discharge Planning: Too soon to determine     Some areas of assessment may be incomplete due to COVID-19 precautions. Kyra Wheeler R.D. L.JOSE.   Clinical Dietitian  Office: [phone]

## 2021-02-04 NOTE — PROGRESS NOTES
RN notified Dr. Elly Naik via perfect serve of positive blood cultures results recently called from lab. Updated MDon current antibiotics. No new orders received.

## 2021-02-05 LAB
ALBUMIN SERPL-MCNC: 2.7 G/DL (ref 3.5–5.2)
ALBUMIN/GLOBULIN RATIO: ABNORMAL (ref 1–2.5)
ALP BLD-CCNC: 94 U/L (ref 40–129)
ALT SERPL-CCNC: <5 U/L (ref 5–41)
ANION GAP SERPL CALCULATED.3IONS-SCNC: 10 MMOL/L (ref 9–17)
AST SERPL-CCNC: 8 U/L
BILIRUB SERPL-MCNC: <0.15 MG/DL (ref 0.3–1.2)
BUN BLDV-MCNC: 11 MG/DL (ref 8–23)
BUN/CREAT BLD: ABNORMAL (ref 9–20)
CALCIUM SERPL-MCNC: 8.5 MG/DL (ref 8.6–10.4)
CHLORIDE BLD-SCNC: 102 MMOL/L (ref 98–107)
CO2: 25 MMOL/L (ref 20–31)
CREAT SERPL-MCNC: 0.62 MG/DL (ref 0.7–1.2)
CULTURE: ABNORMAL
GFR AFRICAN AMERICAN: >60 ML/MIN
GFR NON-AFRICAN AMERICAN: >60 ML/MIN
GFR SERPL CREATININE-BSD FRML MDRD: ABNORMAL ML/MIN/{1.73_M2}
GFR SERPL CREATININE-BSD FRML MDRD: ABNORMAL ML/MIN/{1.73_M2}
GLUCOSE BLD-MCNC: 161 MG/DL (ref 75–110)
GLUCOSE BLD-MCNC: 167 MG/DL (ref 70–99)
GLUCOSE BLD-MCNC: 188 MG/DL (ref 75–110)
GLUCOSE BLD-MCNC: 218 MG/DL (ref 75–110)
GLUCOSE BLD-MCNC: 219 MG/DL (ref 75–110)
HCT VFR BLD CALC: 28.7 % (ref 41–53)
HEMOGLOBIN: 8.9 G/DL (ref 13.5–17.5)
Lab: ABNORMAL
MCH RBC QN AUTO: 25.5 PG (ref 26–34)
MCHC RBC AUTO-ENTMCNC: 30.8 G/DL (ref 31–37)
MCV RBC AUTO: 82.8 FL (ref 80–100)
NRBC AUTOMATED: ABNORMAL PER 100 WBC
PDW BLD-RTO: 16.3 % (ref 11.5–14.9)
PLATELET # BLD: 374 K/UL (ref 150–450)
PMV BLD AUTO: 6.8 FL (ref 6–12)
POTASSIUM SERPL-SCNC: 3.8 MMOL/L (ref 3.7–5.3)
RBC # BLD: 3.47 M/UL (ref 4.5–5.9)
SODIUM BLD-SCNC: 137 MMOL/L (ref 135–144)
SPECIMEN DESCRIPTION: ABNORMAL
TOTAL PROTEIN: 6.5 G/DL (ref 6.4–8.3)
VANCOMYCIN TROUGH DATE LAST DOSE: NORMAL
VANCOMYCIN TROUGH DOSE AMOUNT: 1000
VANCOMYCIN TROUGH TIME LAST DOSE: 2143
VANCOMYCIN TROUGH: 12.7 UG/ML (ref 10–20)
WBC # BLD: 6.3 K/UL (ref 3.5–11)

## 2021-02-05 PROCEDURE — 99222 1ST HOSP IP/OBS MODERATE 55: CPT | Performed by: INTERNAL MEDICINE

## 2021-02-05 PROCEDURE — 2580000003 HC RX 258: Performed by: NURSE PRACTITIONER

## 2021-02-05 PROCEDURE — 85027 COMPLETE CBC AUTOMATED: CPT

## 2021-02-05 PROCEDURE — 97530 THERAPEUTIC ACTIVITIES: CPT

## 2021-02-05 PROCEDURE — 80202 ASSAY OF VANCOMYCIN: CPT

## 2021-02-05 PROCEDURE — 2060000000 HC ICU INTERMEDIATE R&B

## 2021-02-05 PROCEDURE — 36415 COLL VENOUS BLD VENIPUNCTURE: CPT

## 2021-02-05 PROCEDURE — 82947 ASSAY GLUCOSE BLOOD QUANT: CPT

## 2021-02-05 PROCEDURE — 97162 PT EVAL MOD COMPLEX 30 MIN: CPT

## 2021-02-05 PROCEDURE — 6360000002 HC RX W HCPCS: Performed by: NURSE PRACTITIONER

## 2021-02-05 PROCEDURE — 99232 SBSQ HOSP IP/OBS MODERATE 35: CPT | Performed by: INTERNAL MEDICINE

## 2021-02-05 PROCEDURE — 6370000000 HC RX 637 (ALT 250 FOR IP): Performed by: NURSE PRACTITIONER

## 2021-02-05 PROCEDURE — 80053 COMPREHEN METABOLIC PANEL: CPT

## 2021-02-05 PROCEDURE — 6360000002 HC RX W HCPCS: Performed by: INTERNAL MEDICINE

## 2021-02-05 PROCEDURE — 2580000003 HC RX 258: Performed by: INTERNAL MEDICINE

## 2021-02-05 PROCEDURE — 6370000000 HC RX 637 (ALT 250 FOR IP): Performed by: STUDENT IN AN ORGANIZED HEALTH CARE EDUCATION/TRAINING PROGRAM

## 2021-02-05 RX ADMIN — INSULIN LISPRO 1 UNITS: 100 INJECTION, SOLUTION INTRAVENOUS; SUBCUTANEOUS at 20:46

## 2021-02-05 RX ADMIN — MORPHINE SULFATE 4 MG: 4 INJECTION, SOLUTION INTRAMUSCULAR; INTRAVENOUS at 09:41

## 2021-02-05 RX ADMIN — INSULIN LISPRO 2 UNITS: 100 INJECTION, SOLUTION INTRAVENOUS; SUBCUTANEOUS at 09:46

## 2021-02-05 RX ADMIN — CEFEPIME 2000 MG: 2 INJECTION, POWDER, FOR SOLUTION INTRAVENOUS at 14:57

## 2021-02-05 RX ADMIN — Medication 1 CAPSULE: at 20:32

## 2021-02-05 RX ADMIN — SODIUM CHLORIDE, PRESERVATIVE FREE 10 ML: 5 INJECTION INTRAVENOUS at 09:41

## 2021-02-05 RX ADMIN — MORPHINE SULFATE 4 MG: 4 INJECTION, SOLUTION INTRAMUSCULAR; INTRAVENOUS at 03:46

## 2021-02-05 RX ADMIN — DEXTROSE MONOHYDRATE 100 ML/HR: 5 INJECTION INTRAVENOUS at 14:57

## 2021-02-05 RX ADMIN — FAMOTIDINE 20 MG: 20 TABLET ORAL at 09:40

## 2021-02-05 RX ADMIN — OXYCODONE HYDROCHLORIDE AND ACETAMINOPHEN 1 TABLET: 5; 325 TABLET ORAL at 13:09

## 2021-02-05 RX ADMIN — BUDESONIDE AND FORMOTEROL FUMARATE DIHYDRATE 2 PUFF: 160; 4.5 AEROSOL RESPIRATORY (INHALATION) at 20:33

## 2021-02-05 RX ADMIN — VANCOMYCIN HYDROCHLORIDE 1250 MG: 1.25 INJECTION, POWDER, LYOPHILIZED, FOR SOLUTION INTRAVENOUS at 21:58

## 2021-02-05 RX ADMIN — INSULIN LISPRO 4 UNITS: 100 INJECTION, SOLUTION INTRAVENOUS; SUBCUTANEOUS at 18:35

## 2021-02-05 RX ADMIN — BUDESONIDE AND FORMOTEROL FUMARATE DIHYDRATE 2 PUFF: 160; 4.5 AEROSOL RESPIRATORY (INHALATION) at 09:40

## 2021-02-05 RX ADMIN — CEFEPIME 2000 MG: 2 INJECTION, POWDER, FOR SOLUTION INTRAVENOUS at 23:30

## 2021-02-05 RX ADMIN — DAKIN'S SOLUTION 0.125% (QUARTER STRENGTH): 0.12 SOLUTION at 10:03

## 2021-02-05 RX ADMIN — MORPHINE SULFATE 4 MG: 4 INJECTION, SOLUTION INTRAMUSCULAR; INTRAVENOUS at 14:57

## 2021-02-05 RX ADMIN — FAMOTIDINE 20 MG: 20 TABLET ORAL at 20:31

## 2021-02-05 RX ADMIN — SODIUM CHLORIDE: 9 INJECTION, SOLUTION INTRAVENOUS at 13:03

## 2021-02-05 RX ADMIN — INSULIN GLARGINE 25 UNITS: 100 INJECTION, SOLUTION SUBCUTANEOUS at 09:45

## 2021-02-05 RX ADMIN — MORPHINE SULFATE 2 MG: 2 INJECTION, SOLUTION INTRAMUSCULAR; INTRAVENOUS at 20:32

## 2021-02-05 RX ADMIN — INSULIN GLARGINE 25 UNITS: 100 INJECTION, SOLUTION SUBCUTANEOUS at 20:46

## 2021-02-05 RX ADMIN — SODIUM CHLORIDE: 9 INJECTION, SOLUTION INTRAVENOUS at 22:36

## 2021-02-05 RX ADMIN — SODIUM CHLORIDE, PRESERVATIVE FREE 10 ML: 5 INJECTION INTRAVENOUS at 20:36

## 2021-02-05 RX ADMIN — INSULIN LISPRO 4 UNITS: 100 INJECTION, SOLUTION INTRAVENOUS; SUBCUTANEOUS at 13:03

## 2021-02-05 RX ADMIN — OXYCODONE HYDROCHLORIDE AND ACETAMINOPHEN 1 TABLET: 5; 325 TABLET ORAL at 22:36

## 2021-02-05 RX ADMIN — CEFEPIME 2000 MG: 2 INJECTION, POWDER, FOR SOLUTION INTRAVENOUS at 09:50

## 2021-02-05 RX ADMIN — VANCOMYCIN HYDROCHLORIDE 1250 MG: 1.25 INJECTION, POWDER, LYOPHILIZED, FOR SOLUTION INTRAVENOUS at 13:09

## 2021-02-05 RX ADMIN — Medication 1 CAPSULE: at 09:40

## 2021-02-05 ASSESSMENT — PAIN SCALES - GENERAL
PAINLEVEL_OUTOF10: 9
PAINLEVEL_OUTOF10: 8
PAINLEVEL_OUTOF10: 7
PAINLEVEL_OUTOF10: 8
PAINLEVEL_OUTOF10: 8
PAINLEVEL_OUTOF10: 6

## 2021-02-05 ASSESSMENT — PAIN DESCRIPTION - FREQUENCY: FREQUENCY: CONTINUOUS

## 2021-02-05 ASSESSMENT — PAIN DESCRIPTION - ONSET: ONSET: ON-GOING

## 2021-02-05 ASSESSMENT — PAIN DESCRIPTION - PAIN TYPE: TYPE: ACUTE PAIN

## 2021-02-05 NOTE — PROGRESS NOTES
Yue Kramer notified of patient's request for SNF at discharge for wound care.  Electronically signed by Avi Koenig RN on 2/5/2021 at 1:14 PM

## 2021-02-05 NOTE — PROGRESS NOTES
After RN educated patient about the pathophysiology of sepsis, patient accused the RN of siding with the physicians against him and that the RN was a \"stupid idiot. \" RN will continue to assess patient readiness for possible surgery.

## 2021-02-05 NOTE — FLOWSHEET NOTE
Extensive discission with patient about who he wants as his healthcare decision [address] completed and copy placed in the front of chart, original & copies to patient & copy scanned & e-mail to Marisela [email or web address] in a secure e-mail        02/05/21 5672   Encounter Summary   Services provided to: Patient   Referral/Consult From: Patient;Multi-disciplinary team;Rounding   Support System Children  ([place])   Continue Visiting   (2/5/21)   Complexity of Encounter Moderate   Length of Encounter 1 hour   Spiritual Assessment Completed Yes   Spiritual/Yazidism   Intervention Nurtured hope;Prayer   Advance Directives (For Healthcare)   Copy in Chart Yes, copy in chart   [facility] Agent's Name [facility] Agent's Phone Number [phone]   If you are unable to speak for yourself, does your Healthcare Agent or Legal Spokesperson know your healthcare wishes?  Yes

## 2021-02-05 NOTE — PROGRESS NOTES
Patient stated during physician rounding that he would like to continue treatment for wound healing with placement to skilled nursing facility. He stated he will follow with Dr Zackery Steinberg outpatient but wants to try to prevent amputation by utilizing IV antibiotics and wound care.

## 2021-02-05 NOTE — PROGRESS NOTES
Vitals:  Patient Vitals for the past 8 hrs:   BP Temp Temp src Pulse Resp SpO2 Weight   21 0721 (!) 153/72 98.1 °F (36.7 °C) Oral 91 17 96 % --   21 0115 (!) 130/59 98.2 °F (36.8 °C) Axillary 90 16 95 % --   21 0022 -- -- -- -- -- -- 152 lb 1.9 oz (69 kg)     Average, Min, and Max for last 24 hours Vitals:  TEMPERATURE:  Temp  Av.3 °F (36.8 °C)  Min: 97.5 °F (36.4 °C)  Max: 98.8 °F (37.1 °C)    RESPIRATIONS RANGE: Resp  Av  Min: 14  Max: 18    PULSE RANGE: Pulse  Av.1  Min: 82  Max: 107    BLOOD PRESSURE RANGE:  Systolic (74IDY), SKL:261 , Min:130 , WXQ:993   ; Diastolic (87ELU), MGW:43, Min:59, Max:76      PULSE OXIMETRY RANGE: SpO2  Av.5 %  Min: 94 %  Max: 96 %    I/O last 3 completed shifts:  In: -   Out: 800 [Urine:800]    CBC:  Recent Labs     21   WBC 13.1* 9.5   HGB 9.7* 8.9*   HCT 31.5* 28.0*   * 451*   .0*  --         BMP:  Recent Labs     21   * 136   K 4.7 4.4   CL 96* 102   CO2 25 25   BUN 20 18   CREATININE 0.96 0.86   GLUCOSE 447* 381*   CALCIUM 9.3 8.6        Coags:  Recent Labs     21   PROT 6.7   INR 1.0       Lab Results   Component Value Date    SEDRATE 36 (H) 2021     Recent Labs     21   .0*       Lower Extremity Physical Exam:  Vascular: DP and PT pulses are not palpable. CFT <5 seconds to all remaining digits. Hair growth is absent to the level of the remaining digits. nonpitting edema.       Neuro: Saph/sural/SP/DP/plantar sensation diminished to light touch.     Musculoskeletal: Muscle strength is 4/5 to all lower extremity muscle groups. Gross deformity is s/p L TMA with 5th ray amputation; R BKA. POP on probing to wound. Neg pain on calf squeeze. All LE muscle compartments soft and compressible.      Dermatologic: iLsa Justin #1 located Lateral Left amputation stump and measures approximately 6.0cm x 6.0cm x 2.0cm. Base is necrotic fibrotic. Exposed cuboid. Periwound skin is necrotic.  serous drainage noted with associated mal odor. Erythema periwound present with associated increase in warmth. Does probe to bone, sinus track, or undermine. No fluctuance, crepitus, or induration. Interdigital maceration absent. Clinical Images:  None    Imaging:   MRI FOOT LEFT W WO CONTRAST   Final Result   1. Lateral soft tissue wound distally with underlying osteomyelitis of the   residual 4th metatarsal and cuboid. No organized drainable fluid collection   identified. Subcutaneous edema and mild postcontrast enhancement the soft   tissues suggestive of cellulitis. 2. Patchy edema and associated patchy postcontrast enhancement involving the   medial, intermediate, and lateral cuneiforms and bases of the 2nd and 3rd   metatarsals. T1 marrow signal is grossly preserved findings may reflect   reactive noninfectious osteitis, however, early changes of osteomyelitis   difficult to entirely exclude. XR FOOT LEFT (MIN 3 VIEWS)   Final Result   Postsurgical changes as above. Large soft tissue ulceration lateral aspect   of the foot is well as soft tissue swelling distally and ulceration along the   dorsal foot. Lucencies in the soft tissues concerning for gas. While there is no discrete bony erosion, given proximity of soft tissue   changes underlying osteomyelitis is of concern. Cultures: foot - few/rareGPC in pairs/clusters  1x Blood + GPC (2/3/21)    Assessment   Jennifer Sorto is a 63 y.o. male with   1. Osteomyelitis cuboid; fourth metatarsal left foot with possibly osteomyelitis of cuneiforms  2. DM2 with peripheral neuropathy  3. 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 S/p BKA R  5. PAD  6. Carotid stenosis  7.  Noncompliance with medical advice    Principal Problem:    Sepsis ([address]  Active Problems:    Tobacco dependence    Uncontrolled type 2 diabetes mellitus with hyperglycemia ([address]    Noncompliance Acute osteomyelitis of left foot ([address]  Resolved Problems:    * No resolved hospital problems. *       Plan     · Patient examined and evaluated at bedside   · Treatment options discussed in detail with the patient  · MRI reviewed - No drainable abscess, suspicious for OM cuboid, 4th met. Early OM to cuneiforms, 2nd and 3rd mets. · Medical management per primary. · Vasc - available to schedule for outpatient BKA   · Reviewed PVRs (1/2/21)  ? Moderate femoropopliteal tib peroneal occlusive disease LLE.  L-0.51  · ID consulted  ? Cefepime/Vanco  ? Cx - few/rare GPC in pairs/clusters  · Discussed results of MRI results yesterday with pt. Discussed conservative options vs proximal amputation. Foot is nonsalvagable from podiatric surgery standpoint. Proximal amputation recommended if pt elects to pursue surgical treatment to provide improved function and more definitive treatment. Patient would like to speak with daughter and attending later today. · Dressing applied to Left foot: Davidin's WTD, DSD  · NWB in surgical shoe to Left lower extremity  · Will discuss with Joyce Mcnamara 26   Podiatric Medicine & Surgery   2/5/2021 at 7:28 AM     I performed a history and physical examination of the patient and discussed management with the patient and the resident. I reviewed the residents note and agree with the documented findings and plan of care. Any changes by me in the subjective, objective, assessment, and plan sections were made in the note. I was personally present for the key portions of any procedures. Additional findings and plans are as noted below. Discussed treatment options again with patient today. Discussed his positive blood culture result. We will await infectious disease evaluation today. Patient is considering below-knee amputation of the left but would like some more time to talk with his daughter and with the infectious disease service.     Mary Grace Lopez DPM 2/5/2021 at 10:45 AM

## 2021-02-05 NOTE — PROGRESS NOTES
Pharmacy Vancomycin Consult     Vancomycin Day: 3  Current Dosin mg every 24 hours    Temp max:  98.6    Recent Labs     21  0427 21  0813   BUN 18 11       Recent Labs     21  0427 21  0813   CREATININE 0.86 0.62*       Recent Labs     21  0427 21  0813   WBC 9.5 6.3         Intake/Output Summary (Last 24 hours) at 2021 0855  Last data filed at 2021 0815  Gross per 24 hour   Intake --   Output 1500 ml   Net -1500 ml       Culture Date      Source                       Results  2/3 blood x2 pending   foot culture     Ht Readings from Last 1 Encounters:   21 6' 1\" (1.854 m)        Wt Readings from Last 1 Encounters:   21 152 lb 1.9 oz (69 kg)         Body mass index is 20.07 kg/m². Estimated Creatinine Clearance: 119 mL/min (A) (based on SCr of 0.62 mg/dL (L)). Trough: 12.7   21 at 0813    Assessment/Plan:  Trough level below target of 15-20. Will increase dose to vancomycin 1250 mg every 12 hours. Will continue to follow.    Young Cabral, Pharm D, [address]  2021 8:57 AM
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
## 2021-02-05 NOTE — PLAN OF CARE
Problem: Falls - Risk of:  Goal: Will remain free from falls  Description: Will remain free from falls  Outcome: Met This Shift  Note: Bed alarm on   Goal: Absence of physical injury  Description: Absence of physical injury  Outcome: Met This Shift     Problem: Skin Integrity:  Goal: Will show no infection signs and symptoms  Description: Will show no infection signs and symptoms  Outcome: Met This Shift  Note: No fever or chills noted   Goal: Absence of new skin breakdown  Description: Absence of new skin breakdown  Outcome: Met This Shift

## 2021-02-05 NOTE — ACP (ADVANCE CARE PLANNING)
Advance Care Planning     General Advance Care Planning (ACP) Conversation    Date of Conversation: 2/3/2021  Conducted with: Patient with Decision Making Capacity    Healthcare Decision Maker:      Primary Decision Maker: Hadassah Osgood - [phone]    Click here to complete [address] including selection of the Healthcare Decision Maker Relationship (ie \"Primary\")  Today we documented Decision Maker(s) consistent with ACP documents on file. Content/Action Overview:   Has ACP document(s) on file - reflects the patient's care preferences  Reviewed DNR/DNI and patient         Length of Voluntary ACP Conversation in minutes:  1 hour    Sowmya Mckenzie

## 2021-02-05 NOTE — PLAN OF CARE
Problem: Falls - Risk of:  Goal: Will remain free from falls  Description: Will remain free from falls  Outcome: Ongoing  Goal: Absence of physical injury  Description: Absence of physical injury  Outcome: Ongoing   Patient is non compliant. Education reinforced  Problem: Skin Integrity:  Goal: Will show no infection signs and symptoms  Description: Will show no infection signs and symptoms  Outcome: Ongoing Skin assessment complete. . Turned and repositioned every two hours. Area kept free from moisture. Proper nourishment and fluids encouraged, as appropriate. Will continue to monitor for additional needs and changes in skin breakdown.     Goal: Absence of new skin breakdown  Description: Absence of new skin breakdown  Outcome: Ongoing     Problem: Pain:  Goal: Pain level will decrease  Description: Pain level will decrease  Outcome: Ongoing  Goal: Control of acute pain  Description: Control of acute pain  Outcome: Ongoing  Goal: Control of chronic pain  Description: Control of chronic pain  Outcome: Ongoing     Problem: Nutrition  Goal: Optimal nutrition therapy  Outcome: Ongoing     Problem: Musculor/Skeletal Functional Status  Goal: Highest potential functional level  Outcome: Ongoing  Goal: Absence of falls  Outcome: Ongoing

## 2021-02-05 NOTE — PROGRESS NOTES
[facility] Internal Medicine    Progress Note     2/5/2021    12:12 PM    Name:   Erlin Myers  MRN:     [id number]     Acct:      [de-identified]   Room:   [address] [place] [address] Day:  2  Admit Date:  2/3/2021  8:18 PM    PCP:   Ramandeep Strickland DO  Code Status:  Full Code    Subjective:     C/C:   Chief Complaint   Patient presents with    Wound Check     Principal Problem:    Sepsis ([facility]  Active Problems:    Tobacco dependence    Uncontrolled type 2 diabetes mellitus with hyperglycemia ([facility]    Noncompliance    Acute osteomyelitis of left foot ([facility]  Resolved Problems:    * No resolved hospital problems. *      Interval History Status: improved.        Night no fever chills blood culture 1 of 2 is positive for staph aureus     Significant last 24 hr data reviewed ;   Vitals:    02/04/21 1945 02/05/21 0022 02/05/21 0115 02/05/21 0721   BP: (!) 156/73  (!) 130/59 (!) 153/72   Pulse: 107  90 91   Resp: 14  16 17   Temp: 98.1 °F (36.7 °C)  98.2 °F (36.8 °C) 98.1 °F (36.7 °C)   TempSrc: Axillary  Axillary Oral   SpO2: 94%  95% 96%   Weight:  152 lb 1.9 oz (69 kg)     Height:          Recent Results (from the past 24 hour(s))   POC Glucose Fingerstick    Collection Time: 02/04/21  4:02 PM   Result Value Ref Range    POC Glucose 183 (H) 75 - 110 mg/dL   POC Glucose Fingerstick    Collection Time: 02/04/21  6:58 PM   Result Value Ref Range    POC Glucose 297 (H) 75 - 110 mg/dL   POC Glucose Fingerstick    Collection Time: 02/05/21  7:21 AM   Result Value Ref Range    POC Glucose 161 (H) 75 - 110 mg/dL   Comprehensive Metabolic Panel w/ Reflex to MG    Collection Time: 02/05/21  8:13 AM   Result Value Ref Range    Glucose 167 (H) 70 - 99 mg/dL    BUN 11 8 - 23 mg/dL    CREATININE 0.62 (L) 0.70 - 1.20 mg/dL    Bun/Cre Ratio NOT REPORTED 9 - 20    Calcium 8.5 (L) 8.6 - 10.4 mg/dL    Sodium 137 135 - 144 mmol/L    Potassium 3.8 3.7 - 5.3 mmol/L    Chloride 102 98 - 107 mmol/L    CO2 25 20 - 31 mmol/L    Anion Gap 10 9 - 17 mmol/L    Alkaline Phosphatase 94 40 - 129 U/L    ALT <5 (L) 5 - 41 U/L    AST 8 <40 U/L    Total Bilirubin <0.15 (L) 0.3 - 1.2 mg/dL    Total Protein 6.5 6.4 - 8.3 g/dL    Albumin 2.7 (L) 3.5 - 5.2 g/dL    Albumin/Globulin Ratio NOT REPORTED 1.0 - 2.5    GFR Non-African American >60 >60 mL/min    GFR African American >60 >60 mL/min    GFR Comment          GFR Staging NOT REPORTED    CBC    Collection Time: 02/05/21  8:13 AM   Result Value Ref Range    WBC 6.3 3.5 - 11.0 k/uL    RBC 3.47 (L) 4.5 - 5.9 m/uL    Hemoglobin 8.9 (L) 13.5 - 17.5 g/dL    Hematocrit 28.7 (L) 41 - 53 %    MCV 82.8 80 - 100 fL    MCH 25.5 (L) 26 - 34 pg    MCHC 30.8 (L) 31 - 37 g/dL    RDW 16.3 (H) 11.5 - 14.9 %    Platelets 169 644 - 459 k/uL    MPV 6.8 6.0 - 12.0 fL    NRBC Automated NOT REPORTED per 100 WBC   Vancomycin, trough    Collection Time: 02/05/21  8:13 AM   Result Value Ref Range    Vancomycin Tr 12.7 10.0 - 20.0 ug/mL    Vancomycin Trough Dose amount 1,000     Vancomycin Trough Date last dose 20,421     Vancomycin Trough Time last dose 2,143    POC Glucose Fingerstick    Collection Time: 02/05/21 11:32 AM   Result Value Ref Range    POC Glucose 219 (H) 75 - 110 mg/dL     Recent Labs     02/04/21  1105 02/04/21  1602 02/04/21  1858 02/05/21  0721 02/05/21  1132   POCGLU 212* 183* 297* 161* 219*        Xr Foot Left (min 3 Views)    Result Date: 2/3/2021  EXAMINATION: THREE XRAY VIEWS OF THE LEFT FOOT 2/3/2021 8:56 pm COMPARISON: December 31, 2020 HISTORY: ORDERING SYSTEM PROVIDED HISTORY: foot infection evaluation for gas TECHNOLOGIST PROVIDED HISTORY: foot infection evaluation for gas Reason for Exam: foot infection, evaluation for gas Acuity: Acute Type of Exam: Initial FINDINGS: Interval resection of the 5th metatarsal.  Previous resections again noted involving the 1st through 4th digit distal to the mid shaft of the metatarsals.   Large ulceration lateral aspect of the foot is well as soft tissue osteoarthritis. No joint effusion. SOFT TISSUES: Soft tissue wound distally and laterally. Subcutaneous edema and postcontrast enhancement the soft tissues consistent with cellulitis. No organized drainable fluid collection identified. TENDONS: Postoperative changes of the distal flexor and extensor tendons. No tenosynovitis identified. 1. Lateral soft tissue wound distally with underlying osteomyelitis of the residual 4th metatarsal and cuboid. No organized drainable fluid collection identified. Subcutaneous edema and mild postcontrast enhancement the soft tissues suggestive of cellulitis. 2. Patchy edema and associated patchy postcontrast enhancement involving the medial, intermediate, and lateral cuneiforms and bases of the 2nd and 3rd metatarsals. T1 marrow signal is grossly preserved findings may reflect reactive noninfectious osteitis, however, early changes of osteomyelitis difficult to entirely exclude. HPI:   See history in H and P      Review of Systems:     Constitutional:  negative for chills, fevers, sweats  Respiratory:  negative for cough, dyspnea on exertion, hemoptysis, shortness of breath, wheezing  Cardiovascular:  negative for chest pain, chest pressure/discomfort, lower extremity edema, palpitations  Gastrointestinal:  negative for abdominal pain, constipation, diarrhea, nausea, vomiting  Neurological:  negative for dizziness, headache  Data:     Past Medical History:  no change     Social History:  no change    Family History: @no change    Vitals:      I/O (24Hr):     Intake/Output Summary (Last 24 hours) at 2/5/2021 1212  Last data filed at 2/5/2021 1033  Gross per 24 hour   Intake --   Output 1175 ml   Net -1175 ml       Labs:    URINE ANALYSIS:   Lab Results   Component Value Date    LABURIN 200 01/10/2019        CBC:  Lab Results   Component Value Date    WBC 6.3 02/05/2021    HGB 8.9 02/05/2021     02/05/2021     05/02/2012        BMP:    Lab Results Component Value Date     2021    K 3.8 2021     2021    CO2 25 2021    BUN 11 2021    CREATININE 0.62 2021    GLUCOSE 167 2021    GLUCOSE 129 2012      LIVER PROFILE:  Lab Results   Component Value Date    ALT <5 2021    AST 8 2021    PROT 6.5 2021    BILITOT <0.15 2021    BILIDIR <0.08 2020    LABALBU 2.7 2021    LABALBU 4.4 2012               Radiology:  Medications:      Allergies:      Current Meds:   Scheduled Meds:    vancomycin  1,250 mg Intravenous Q12H    insulin lispro  0-12 Units Subcutaneous TID WC    insulin lispro  0-6 Units Subcutaneous Nightly    cefepime  2,000 mg Intravenous Q8H    sodium hypochlorite   Irrigation Daily    vancomycin (VANCOCIN) intermittent dosing (placeholder)   Other RX Placeholder    budesonide-formoterol  2 puff Inhalation BID    famotidine  20 mg Oral BID    insulin glargine  25 Units Subcutaneous BID    lactobacillus  1 capsule Oral BID    sodium chloride flush  10 mL Intravenous 2 times per day    [Held by provider] apixaban  5 mg Oral BID     Continuous Infusions:    dextrose      sodium chloride 150 mL/hr at 21     PRN Meds: glucose, dextrose, glucagon (rDNA), dextrose, ondansetron, sodium chloride flush, oxyCODONE-acetaminophen, sodium chloride flush, acetaminophen **OR** acetaminophen, morphine **OR** morphine      Physical Examination:        BP (!) 153/72   Pulse 91   Temp 98.1 °F (36.7 °C) (Oral)   Resp 17   Ht 6' 1\" (1.854 m)   Wt 152 lb 1.9 oz (69 kg)   SpO2 96%   BMI 20.07 kg/m²   Temp (24hrs), Av.1 °F (36.7 °C), Min:97.5 °F (36.4 °C), Max:98.6 °F (37 °C)    Recent Labs     21  1602 21  1858 21  0721 21  1132   POCGLU 183* 297* 161* 219*       Intake/Output Summary (Last 24 hours) at 2021 1212  Last data filed at 2021 1033  Gross per 24 hour   Intake --   Output 1175 ml   Net -1175 ml General Appearance:  alert, well appearing, and in no acute distress  Mental status: oriented to person, place, and time with normal affect  Head:  normocephalic, atraumatic. Eye: no icterus, redness, pupils equal and reactive, extraocular eye movements intact, conjunctiva clear  Ear: normal external ear, no discharge, hearing intact  Nose:  no drainage noted  Mouth: mucous membranes moist  Neck: supple, no carotid bruits, thyroid not palpable  Lungs: Bilateral equal air entry, clear to ausculation, no wheezing, rales or rhonchi, normal effort  Cardiovascular: normal rate, regular rhythm, no murmur, gallop, rub. Abdomen: Soft, nontender, nondistended, normal bowel sounds, no hepatomegaly or splenomegaly  Neurologic: There are no new focal motor or sensory deficits, normal muscle tone and bulk, no abnormal sensation, normal speech, cranial nerves II through XII grossly intact  Skin: No gross lesions, rashes, bruising or bleeding on exposed skin area  Extremities:  peripheral pulses palpable, no pedal edema or calf pain with palpation  Left foot osteomyelitis history of right leg amputation  Psych: Normal mood      Assessment:        Primary Problem  Sepsis [facility])    Active Hospital Problems    Diagnosis Date Noted    Sepsis ([facility] [A41.9] 02/03/2021    Acute osteomyelitis of left foot ([facility] [M86.172] 11/03/2020    Noncompliance [Z91.19] 08/23/2020    Uncontrolled type 2 diabetes mellitus with hyperglycemia ([facility] [E11.65] 02/24/2020    Tobacco dependence [F17.200]      Plan:        1. Left foot osteomyelitis staph aureus  2. Foot is nonsalvageable podiatry and vascular input noted  3. Patient is agreeable for outpatient left below-knee amputation  4. PICC line in place IV Vanco and cefepime  5.  Blood culture positive for staph aureus staph aureus suggestive      1 of 2-second cultures pending  Continue both antibiotics patient likely will be discharged on IV vancomycin for definitive surgery left below-knee amputation  History of noncompliance and uncontrolled diabetes mellitus    Allison Mariscal MD  2/5/2021  12:12 PM

## 2021-02-06 LAB
ALBUMIN SERPL-MCNC: 2.5 G/DL (ref 3.5–5.2)
ALBUMIN/GLOBULIN RATIO: ABNORMAL (ref 1–2.5)
ALP BLD-CCNC: 87 U/L (ref 40–129)
ALT SERPL-CCNC: <5 U/L (ref 5–41)
ANION GAP SERPL CALCULATED.3IONS-SCNC: 7 MMOL/L (ref 9–17)
AST SERPL-CCNC: 8 U/L
BILIRUB SERPL-MCNC: <0.15 MG/DL (ref 0.3–1.2)
BUN BLDV-MCNC: 14 MG/DL (ref 8–23)
BUN/CREAT BLD: ABNORMAL (ref 9–20)
CALCIUM SERPL-MCNC: 8.6 MG/DL (ref 8.6–10.4)
CHLORIDE BLD-SCNC: 104 MMOL/L (ref 98–107)
CO2: 25 MMOL/L (ref 20–31)
CREAT SERPL-MCNC: 0.76 MG/DL (ref 0.7–1.2)
GFR AFRICAN AMERICAN: >60 ML/MIN
GFR NON-AFRICAN AMERICAN: >60 ML/MIN
GFR SERPL CREATININE-BSD FRML MDRD: ABNORMAL ML/MIN/{1.73_M2}
GFR SERPL CREATININE-BSD FRML MDRD: ABNORMAL ML/MIN/{1.73_M2}
GLUCOSE BLD-MCNC: 137 MG/DL (ref 75–110)
GLUCOSE BLD-MCNC: 151 MG/DL (ref 70–99)
GLUCOSE BLD-MCNC: 235 MG/DL (ref 75–110)
GLUCOSE BLD-MCNC: 249 MG/DL (ref 75–110)
GLUCOSE BLD-MCNC: 261 MG/DL (ref 75–110)
GLUCOSE BLD-MCNC: 269 MG/DL (ref 75–110)
HCT VFR BLD CALC: 26.8 % (ref 41–53)
HEMOGLOBIN: 8.7 G/DL (ref 13.5–17.5)
MCH RBC QN AUTO: 26.6 PG (ref 26–34)
MCHC RBC AUTO-ENTMCNC: 32.4 G/DL (ref 31–37)
MCV RBC AUTO: 82.1 FL (ref 80–100)
NRBC AUTOMATED: ABNORMAL PER 100 WBC
PDW BLD-RTO: 16.4 % (ref 11.5–14.9)
PLATELET # BLD: 390 K/UL (ref 150–450)
PMV BLD AUTO: 7.4 FL (ref 6–12)
POTASSIUM SERPL-SCNC: 3.8 MMOL/L (ref 3.7–5.3)
RBC # BLD: 3.26 M/UL (ref 4.5–5.9)
SODIUM BLD-SCNC: 136 MMOL/L (ref 135–144)
TOTAL PROTEIN: 6.5 G/DL (ref 6.4–8.3)
WBC # BLD: 7.8 K/UL (ref 3.5–11)

## 2021-02-06 PROCEDURE — 82947 ASSAY GLUCOSE BLOOD QUANT: CPT

## 2021-02-06 PROCEDURE — 2060000000 HC ICU INTERMEDIATE R&B

## 2021-02-06 PROCEDURE — 6370000000 HC RX 637 (ALT 250 FOR IP): Performed by: NURSE PRACTITIONER

## 2021-02-06 PROCEDURE — 36415 COLL VENOUS BLD VENIPUNCTURE: CPT

## 2021-02-06 PROCEDURE — 99232 SBSQ HOSP IP/OBS MODERATE 35: CPT | Performed by: INTERNAL MEDICINE

## 2021-02-06 PROCEDURE — 6360000002 HC RX W HCPCS: Performed by: INTERNAL MEDICINE

## 2021-02-06 PROCEDURE — 6360000002 HC RX W HCPCS: Performed by: NURSE PRACTITIONER

## 2021-02-06 PROCEDURE — 80053 COMPREHEN METABOLIC PANEL: CPT

## 2021-02-06 PROCEDURE — 85027 COMPLETE CBC AUTOMATED: CPT

## 2021-02-06 PROCEDURE — 2580000003 HC RX 258: Performed by: NURSE PRACTITIONER

## 2021-02-06 PROCEDURE — 2580000003 HC RX 258: Performed by: INTERNAL MEDICINE

## 2021-02-06 RX ORDER — HEPARIN SODIUM 5000 [USP'U]/ML
5000 INJECTION, SOLUTION INTRAVENOUS; SUBCUTANEOUS EVERY 8 HOURS SCHEDULED
Status: DISCONTINUED | OUTPATIENT
Start: 2021-02-06 | End: 2021-02-08 | Stop reason: HOSPADM

## 2021-02-06 RX ADMIN — MORPHINE SULFATE 4 MG: 4 INJECTION, SOLUTION INTRAMUSCULAR; INTRAVENOUS at 08:13

## 2021-02-06 RX ADMIN — SODIUM CHLORIDE, PRESERVATIVE FREE 10 ML: 5 INJECTION INTRAVENOUS at 08:10

## 2021-02-06 RX ADMIN — INSULIN LISPRO 4 UNITS: 100 INJECTION, SOLUTION INTRAVENOUS; SUBCUTANEOUS at 12:14

## 2021-02-06 RX ADMIN — FAMOTIDINE 20 MG: 20 TABLET ORAL at 08:05

## 2021-02-06 RX ADMIN — BUDESONIDE AND FORMOTEROL FUMARATE DIHYDRATE 2 PUFF: 160; 4.5 AEROSOL RESPIRATORY (INHALATION) at 19:38

## 2021-02-06 RX ADMIN — CEFEPIME 2000 MG: 2 INJECTION, POWDER, FOR SOLUTION INTRAVENOUS at 15:13

## 2021-02-06 RX ADMIN — MORPHINE SULFATE 2 MG: 2 INJECTION, SOLUTION INTRAMUSCULAR; INTRAVENOUS at 01:13

## 2021-02-06 RX ADMIN — INSULIN GLARGINE 25 UNITS: 100 INJECTION, SOLUTION SUBCUTANEOUS at 08:05

## 2021-02-06 RX ADMIN — MORPHINE SULFATE 4 MG: 4 INJECTION, SOLUTION INTRAMUSCULAR; INTRAVENOUS at 19:27

## 2021-02-06 RX ADMIN — HEPARIN SODIUM 5000 UNITS: 5000 INJECTION INTRAVENOUS; SUBCUTANEOUS at 15:13

## 2021-02-06 RX ADMIN — SODIUM CHLORIDE: 9 INJECTION, SOLUTION INTRAVENOUS at 20:58

## 2021-02-06 RX ADMIN — ONDANSETRON 4 MG: 2 INJECTION INTRAMUSCULAR; INTRAVENOUS at 15:21

## 2021-02-06 RX ADMIN — OXYCODONE HYDROCHLORIDE AND ACETAMINOPHEN 1 TABLET: 5; 325 TABLET ORAL at 03:59

## 2021-02-06 RX ADMIN — INSULIN LISPRO 2 UNITS: 100 INJECTION, SOLUTION INTRAVENOUS; SUBCUTANEOUS at 19:26

## 2021-02-06 RX ADMIN — MORPHINE SULFATE 4 MG: 4 INJECTION, SOLUTION INTRAMUSCULAR; INTRAVENOUS at 15:21

## 2021-02-06 RX ADMIN — HEPARIN SODIUM 5000 UNITS: 5000 INJECTION INTRAVENOUS; SUBCUTANEOUS at 20:58

## 2021-02-06 RX ADMIN — Medication 1 CAPSULE: at 08:05

## 2021-02-06 RX ADMIN — MORPHINE SULFATE 4 MG: 4 INJECTION, SOLUTION INTRAMUSCULAR; INTRAVENOUS at 23:41

## 2021-02-06 RX ADMIN — INSULIN LISPRO 6 UNITS: 100 INJECTION, SOLUTION INTRAVENOUS; SUBCUTANEOUS at 17:15

## 2021-02-06 RX ADMIN — CEFEPIME 2000 MG: 2 INJECTION, POWDER, FOR SOLUTION INTRAVENOUS at 08:05

## 2021-02-06 RX ADMIN — DAKIN'S SOLUTION 0.125% (QUARTER STRENGTH): 0.12 SOLUTION at 08:10

## 2021-02-06 RX ADMIN — VANCOMYCIN HYDROCHLORIDE 1250 MG: 1.25 INJECTION, POWDER, LYOPHILIZED, FOR SOLUTION INTRAVENOUS at 21:03

## 2021-02-06 RX ADMIN — INSULIN GLARGINE 25 UNITS: 100 INJECTION, SOLUTION SUBCUTANEOUS at 19:26

## 2021-02-06 RX ADMIN — SODIUM CHLORIDE, PRESERVATIVE FREE 10 ML: 5 INJECTION INTRAVENOUS at 19:38

## 2021-02-06 RX ADMIN — ONDANSETRON 4 MG: 2 INJECTION INTRAMUSCULAR; INTRAVENOUS at 23:44

## 2021-02-06 RX ADMIN — Medication 1 CAPSULE: at 19:26

## 2021-02-06 RX ADMIN — FAMOTIDINE 20 MG: 20 TABLET ORAL at 19:26

## 2021-02-06 RX ADMIN — BUDESONIDE AND FORMOTEROL FUMARATE DIHYDRATE 2 PUFF: 160; 4.5 AEROSOL RESPIRATORY (INHALATION) at 08:07

## 2021-02-06 RX ADMIN — CEFEPIME 2000 MG: 2 INJECTION, POWDER, FOR SOLUTION INTRAVENOUS at 23:08

## 2021-02-06 RX ADMIN — VANCOMYCIN HYDROCHLORIDE 1250 MG: 1.25 INJECTION, POWDER, LYOPHILIZED, FOR SOLUTION INTRAVENOUS at 10:25

## 2021-02-06 ASSESSMENT — PAIN SCALES - GENERAL
PAINLEVEL_OUTOF10: 8
PAINLEVEL_OUTOF10: 4
PAINLEVEL_OUTOF10: 6
PAINLEVEL_OUTOF10: 6
PAINLEVEL_OUTOF10: 8
PAINLEVEL_OUTOF10: 5

## 2021-02-06 ASSESSMENT — PAIN DESCRIPTION - PAIN TYPE: TYPE: ACUTE PAIN

## 2021-02-06 ASSESSMENT — PAIN DESCRIPTION - LOCATION: LOCATION: FOOT

## 2021-02-06 NOTE — PROGRESS NOTES
Physical Therapy  DATE: 2021    NAME: Erlin Myers  MRN: [id number]   : 1955    Patient not seen this date for Physical Therapy due to:  [] Blood transfusion in progress  [] Cancel by RN  [] Hemodialysis  [x]  Refusal by Patient; checked in at [phone]. HA [place] approved therapy. Patient stated that he did not sleep well last night and just did therapy yesterday. Writer educated patient on the importance of therapy and to participate everyday. Patient stated \"I just want a day off\". Max encouragement from writer to participate in therapy and patient continuously refusing. RN notified of patient refusal   [] Spine Precautions   [] Strict Bedrest  [] Surgery  [] Testing      [] Other        [] PT being discontinued at this time. Patient independent. No further needs. [] PT being discontinued at this time as the patient has been transferred to hospice care. No further needs.     Electronically signed by Fern Chowdary PTA on 21 at 10:16 AM EST

## 2021-02-06 NOTE — CARE COORDINATION
Spoke with patient again and he chose [facility]. SW faxed the referral to this facility and he will need a pre-cert to admit to this facility.

## 2021-02-06 NOTE — PROGRESS NOTES
Progress Note  Podiatric Medicine and Surgery     Subjective     CC: L foot wound    Patient seen and examined at bedside  Relates nausea improved; denies C/F/V  Pain controlled  He feels extremely tired    HPI :  Bora Cano is a 63 y.o. male seen at [facility] for Left foot wound. Patient follows podiatrist Dr. Anastacia Mathews for Diabetic wound care. Has history of multiple AMA leaves from previous admissions. Notes [facility] has not been giving him abx or applying wound vacs properly. Previous notes from [place] [id number] state pt has been noncompliant and removing vac after placement and walking on L foot despite instructions note to. Was seen in [address] [place] [address],[address] yesterday and recommended direct admission however no beds available. Pt was brought to ED and then left hospital. Arrived to ED later that night and was noted to be disruptive and verbally abusive towards staff. He has been making strides in his understanding that he will likely need a BKA. PCP is Cassia Robles DO    ROS: Denies N/V/F/C/SOB/CP. Otherwise negative except at stated in the HPI.      Medications:  Scheduled Meds:   heparin (porcine)  5,000 Units Subcutaneous 3 times per day    vancomycin  1,250 mg Intravenous Q12H    insulin lispro  0-12 Units Subcutaneous TID WC    insulin lispro  0-6 Units Subcutaneous Nightly    cefepime  2,000 mg Intravenous Q8H    sodium hypochlorite   Irrigation Daily    vancomycin (VANCOCIN) intermittent dosing (placeholder)   Other RX Placeholder    budesonide-formoterol  2 puff Inhalation BID    famotidine  20 mg Oral BID    insulin glargine  25 Units Subcutaneous BID    lactobacillus  1 capsule Oral BID    sodium chloride flush  10 mL Intravenous 2 times per day    [Held by provider] apixaban  5 mg Oral BID       Continuous Infusions:   dextrose 100 mL/hr (02/05/21 1457)    sodium chloride 150 mL/hr at 02/05/21 2236       PRN Meds:glucose, dextrose, glucagon (rDNA), dextrose, ondansetron, sodium chloride flush,    Dermatologic: Left Lateral left amputation stump ulceration measures approximately 5.6 cm x 5.9.0cm x 2.0cm. Base is fibro-necrotic. Exposed cuboid. Serous drainage noted with associated mal odor. Erythema absent. No fluctuance, crepitus, or induration. Clinical Images:  None    Imaging:   MRI FOOT LEFT W WO CONTRAST   Final Result   1. Lateral soft tissue wound distally with underlying osteomyelitis of the   residual 4th metatarsal and cuboid. No organized drainable fluid collection   identified. Subcutaneous edema and mild postcontrast enhancement the soft   tissues suggestive of cellulitis. 2. Patchy edema and associated patchy postcontrast enhancement involving the   medial, intermediate, and lateral cuneiforms and bases of the 2nd and 3rd   metatarsals. T1 marrow signal is grossly preserved findings may reflect   reactive noninfectious osteitis, however, early changes of osteomyelitis   difficult to entirely exclude. XR FOOT LEFT (MIN 3 VIEWS)   Final Result   Postsurgical changes as above. Large soft tissue ulceration lateral aspect   of the foot is well as soft tissue swelling distally and ulceration along the   dorsal foot. Lucencies in the soft tissues concerning for gas. While there is no discrete bony erosion, given proximity of soft tissue   changes underlying osteomyelitis is of concern. Cultures: foot - few/rareGPC in pairs/clusters  1x Blood + GPC (2/3/21)    Assessment   Harika Manzo is a 63 y.o. male with   1. Osteomyelitis cuboid; fourth metatarsal left foot with possible osteomyelitis of cuneiforms  2. DM2 with peripheral neuropathy  3. 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 S/p BKA R  5. PAD  6.  Noncompliance with medical advice    Principal Problem:    Sepsis ([address]  Active Problems:    Tobacco dependence    Uncontrolled type 2 diabetes mellitus with hyperglycemia (HCC)    Noncompliance    Acute osteomyelitis of left foot [facility])  Resolved Problems:    * No resolved hospital problems. *       Plan     · Continue medical management per primary team - discussed case with Dr. Roni Brock in room with patient and patient agrees to BKA   · Treatment options discussed in detail with the patient  · MRI reviewed - No drainable abscess, suspicious for OM cuboid, 4th met. Early OM to cuneiforms, 2nd and 3rd mets. · Vasc - available to schedule for outpatient BKA   · Pt understands risk of life threatening sepsis and positive blood culture result and the importance of a BKA on the right side. He understands and accept the gravity of the situation. Over 30 minutes spend discussing the risks and benefits of amputation of the infected left foot. · Discussed Moderate femoropopliteal tib peroneal occlusive disease LLE.  L-0.51  · ID consulted  ? Cefepime/Vanco  ? Cx - few/rare GPC in pairs/clusters  · Discussed results of MRI results yesterday and today with pt. Discussed conservative options vs proximal amputation. Foot is nonsalvagable from podiatric surgery standpoint. Proximal amputation recommended if pt elects to pursue surgical treatment to provide improved function and more definitive treatment. Patient has spoke to primary care physician with me in the room yesterday and today.   · Dressing applied to Left foot: Dakin's WTD, DSD  · NWB in surgical shoe to Left lower extremity  · Will discuss with Dr. Reinaldo Noble DPM   Podiatric Medicine & Surgery   2/6/2021 at 11:34 AM

## 2021-02-06 NOTE — PLAN OF CARE
Problem: Falls - Risk of:  Goal: Will remain free from falls  Description: Will remain free from falls  Outcome: Ongoing     Problem: Falls - Risk of:  Goal: Absence of physical injury  Description: Absence of physical injury  Outcome: Ongoing     Problem: Skin Integrity:  Goal: Will show no infection signs and symptoms  Description: Will show no infection signs and symptoms  Outcome: Ongoing     Problem: Skin Integrity:  Goal: Absence of new skin breakdown  Description: Absence of new skin breakdown  Outcome: Ongoing     Problem: Pain:  Goal: Pain level will decrease  Description: Pain level will decrease  Outcome: Ongoing     Problem: Pain:  Goal: Control of acute pain  Description: Control of acute pain  Outcome: Ongoing     Problem: Pain:  Goal: Control of chronic pain  Description: Control of chronic pain  Outcome: Ongoing     Problem: Nutrition  Goal: Optimal nutrition therapy  Outcome: Ongoing     Problem: Musculor/Skeletal Functional Status  Goal: Highest potential functional level  Outcome: Ongoing     Problem: Musculor/Skeletal Functional Status  Goal: Absence of falls  Outcome: Ongoing

## 2021-02-06 NOTE — PROGRESS NOTES
[facility]    Progress Note     2/6/2021    10:50 AM    Name:   Janine Wallace  MRN:     [id number]     Acct:      [de-identified]   Room:   [address] [place] [address] Day:  3  Admit Date:  2/3/2021  8:18 PM    PCP:   Piyush Wen DO  Code Status:  Full Code    Subjective:     C/C:   Chief Complaint   Patient presents with    Wound Check     Principal Problem:    Sepsis ([address]  Active Problems:    Tobacco dependence    Uncontrolled type 2 diabetes mellitus with hyperglycemia ([address]    Noncompliance    Acute osteomyelitis of left foot ([address]  Resolved Problems:    * No resolved hospital problems. *      Interval History Status: improved.        Night no fever chills blood culture 1 of 2 is positive for staph aureus     Significant last 24 hr data reviewed ;   Vitals:    02/05/21 2031 02/06/21 0129 02/06/21 0215 02/06/21 0800   BP: (!) 150/70  (!) 150/72 (!) 166/78   Pulse: 90  88 96   Resp: 20  20 20   Temp: 98.2 °F (36.8 °C)  98.5 °F (36.9 °C) 98 °F (36.7 °C)   TempSrc: Oral  Oral Oral   SpO2: 94%  95% 97%   Weight:  154 lb 12.2 oz (70.2 kg)     Height:          Recent Results (from the past 24 hour(s))   POC Glucose Fingerstick    Collection Time: 02/05/21 11:32 AM   Result Value Ref Range    POC Glucose 219 (H) 75 - 110 mg/dL   POC Glucose Fingerstick    Collection Time: 02/05/21  5:12 PM   Result Value Ref Range    POC Glucose 218 (H) 75 - 110 mg/dL   POC Glucose Fingerstick    Collection Time: 02/05/21  8:42 PM   Result Value Ref Range    POC Glucose 188 (H) 75 - 110 mg/dL   Comprehensive Metabolic Panel w/ Reflex to MG    Collection Time: 02/06/21  4:52 AM   Result Value Ref Range    Glucose 151 (H) 70 - 99 mg/dL    BUN 14 8 - 23 mg/dL    CREATININE 0.76 0.70 - 1.20 mg/dL    Bun/Cre Ratio NOT REPORTED 9 - 20    Calcium 8.6 8.6 - 10.4 mg/dL    Sodium 136 135 - 144 mmol/L    Potassium 3.8 3.7 - 5.3 mmol/L    Chloride 104 98 - 107 mmol/L    CO2 25 20 - 31 mmol/L    Anion Gap 7 (L) 9 - 17 mmol/L    Alkaline Phosphatase 87 40 - 129 U/L    ALT <5 (L) 5 - 41 U/L    AST 8 <40 U/L    Total Bilirubin <0.15 (L) 0.3 - 1.2 mg/dL    Total Protein 6.5 6.4 - 8.3 g/dL    Albumin 2.5 (L) 3.5 - 5.2 g/dL    Albumin/Globulin Ratio NOT REPORTED 1.0 - 2.5    GFR Non-African American >60 >60 mL/min    GFR African American >60 >60 mL/min    GFR Comment          GFR Staging NOT REPORTED    CBC    Collection Time: 02/06/21  4:52 AM   Result Value Ref Range    WBC 7.8 3.5 - 11.0 k/uL    RBC 3.26 (L) 4.5 - 5.9 m/uL    Hemoglobin 8.7 (L) 13.5 - 17.5 g/dL    Hematocrit 26.8 (L) 41 - 53 %    MCV 82.1 80 - 100 fL    MCH 26.6 26 - 34 pg    MCHC 32.4 31 - 37 g/dL    RDW 16.4 (H) 11.5 - 14.9 %    Platelets 067 411 - 255 k/uL    MPV 7.4 6.0 - 12.0 fL    NRBC Automated NOT REPORTED per 100 WBC   POC Glucose Fingerstick    Collection Time: 02/06/21  7:40 AM   Result Value Ref Range    POC Glucose 137 (H) 75 - 110 mg/dL     Recent Labs     02/05/21  0721 02/05/21  1132 02/05/21  1712 02/05/21  2042 02/06/21  0740   POCGLU 161* 219* 218* 188* 137*        Xr Foot Left (min 3 Views)    Result Date: 2/3/2021  EXAMINATION: THREE XRAY VIEWS OF THE LEFT FOOT 2/3/2021 8:56 pm COMPARISON: December 31, 2020 HISTORY: ORDERING SYSTEM PROVIDED HISTORY: foot infection evaluation for gas TECHNOLOGIST PROVIDED HISTORY: foot infection evaluation for gas Reason for Exam: foot infection, evaluation for gas Acuity: Acute Type of Exam: Initial FINDINGS: Interval resection of the 5th metatarsal.  Previous resections again noted involving the 1st through 4th digit distal to the mid shaft of the metatarsals. Large ulceration lateral aspect of the foot is well as soft tissue swelling distally. Lucencies noted in the soft tissues distally concerning for gas. Ulceration dorsal aspect of the foot. No discrete bony erosion. Postsurgical changes as above.   Large soft tissue ulceration lateral aspect of the foot is well as soft tissue swelling distally and ulceration along the dorsal foot. Lucencies in the soft tissues concerning for gas. While there is no discrete bony erosion, given proximity of soft tissue changes underlying osteomyelitis is of concern. Mri Foot Left W Wo Contrast    Result Date: 2/4/2021  EXAMINATION: MRI OF THE LEFT FOOT WITH AND WITHOUT CONTRAST, 2/4/2021 2:25 pm TECHNIQUE: Multiplanar multisequence MRI of the left foot was performed with and without the administration of intravenous contrast. COMPARISON: Left foot radiograph February 3, 2021; MRI left foot January 4, 2020 HISTORY: ORDERING SYSTEM PROVIDED HISTORY: r/o deep abscess/ assess extent OM TECHNOLOGIST PROVIDED HISTORY: r/o deep abscess/ assess extent OM Reason for Exam: r/o deep abscess/ assess extent OM Acuity: Acute Type of Exam: Unknown Additional signs and symptoms: PT C/O INFECTION LATERAL SIDE OF LEFT FOOT X 3 MONTHS Relevant Medical/Surgical History: HX TOES AMPUTATED FINDINGS: LISFRANC JOINT:  Lisfranc ligament is visualized and is normal.  The alignment of the tarsal-metatarsal joint is anatomic. BONE MARROW: Pronounced marrow edema of the residual 4th metatarsal base and within the cuboid with associated confluent decreased T1 signal and prominent postcontrast enhancement. Findings consistent with osteomyelitis. The 5th metatarsal base has been resected. Edema and patchy postcontrast enhancement of the medial, intermediate, and lateral cuneiforms and bases of the 2nd and 3rd metatarsals with grossly preserved T1 signal at the sites. Findings may reflect reactive noninfectious osteitis, however, very early changes of osteomyelitis difficult to entirely exclude. JOINTS: Mild midfoot osteoarthritis. No joint effusion. SOFT TISSUES: Soft tissue wound distally and laterally. Subcutaneous edema and postcontrast enhancement the soft tissues consistent with cellulitis. No organized drainable fluid collection identified.  TENDONS: Postoperative changes of the distal flexor and extensor tendons. No tenosynovitis identified. 1. Lateral soft tissue wound distally with underlying osteomyelitis of the residual 4th metatarsal and cuboid. No organized drainable fluid collection identified. Subcutaneous edema and mild postcontrast enhancement the soft tissues suggestive of cellulitis. 2. Patchy edema and associated patchy postcontrast enhancement involving the medial, intermediate, and lateral cuneiforms and bases of the 2nd and 3rd metatarsals. T1 marrow signal is grossly preserved findings may reflect reactive noninfectious osteitis, however, early changes of osteomyelitis difficult to entirely exclude. HPI:   See history in H and P      Review of Systems:     Constitutional:  negative for chills, fevers, sweats  Respiratory:  negative for cough, dyspnea on exertion, hemoptysis, shortness of breath, wheezing  Cardiovascular:  negative for chest pain, chest pressure/discomfort, lower extremity edema, palpitations  Gastrointestinal:  negative for abdominal pain, constipation, diarrhea, nausea, vomiting  Neurological:  negative for dizziness, headache  Data:     Past Medical History:  no change     Social History:  no change    Family History: @no change    Vitals:      I/O (24Hr):     Intake/Output Summary (Last 24 hours) at 2/6/2021 1050  Last data filed at 2/6/2021 0805  Gross per 24 hour   Intake --   Output 1750 ml   Net -1750 ml       Labs:    URINE ANALYSIS:   Lab Results   Component Value Date    LABURIN 200 01/10/2019        CBC:  Lab Results   Component Value Date    WBC 7.8 02/06/2021    HGB 8.7 02/06/2021     02/06/2021     05/02/2012        BMP:    Lab Results   Component Value Date     02/06/2021    K 3.8 02/06/2021     02/06/2021    CO2 25 02/06/2021    BUN 14 02/06/2021    CREATININE 0.76 02/06/2021    GLUCOSE 151 02/06/2021    GLUCOSE 129 05/02/2012      LIVER PROFILE:  Lab Results   Component Value Date    ALT <5 intact, conjunctiva clear  Ear: normal external ear, no discharge, hearing intact  Nose:  no drainage noted  Mouth: mucous membranes moist  Neck: supple, no carotid bruits, thyroid not palpable  Lungs: Bilateral equal air entry, clear to ausculation, no wheezing, rales or rhonchi, normal effort  Cardiovascular: normal rate, regular rhythm, no murmur, gallop, rub. Abdomen: Soft, nontender, nondistended, normal bowel sounds, no hepatomegaly or splenomegaly  Neurologic: There are no new focal motor or sensory deficits, normal muscle tone and bulk, no abnormal sensation, normal speech, cranial nerves II through XII grossly intact  Skin: No gross lesions, rashes, bruising or bleeding on exposed skin area  Extremities:  peripheral pulses palpable, no pedal edema or calf pain with palpation  Left foot osteomyelitis history of right leg amputation  Psych: Normal mood      Assessment:        Primary Problem  Sepsis [facility])    Active Hospital Problems    Diagnosis Date Noted    Sepsis ([facility] [A41.9] 02/03/2021    Acute osteomyelitis of left foot ([facility] [M86.172] 11/03/2020    Noncompliance [Z91.19] 08/23/2020    Uncontrolled type 2 diabetes mellitus with hyperglycemia ([address] [E11.65] 02/24/2020    Tobacco dependence [F17.200]      Plan:        1. Left foot osteomyelitis staph aureus  2. Foot is nonsalvageable podiatry and vascular input noted  3. Patient is agreeable for outpatient left below-knee amputation  4. PICC line in place IV Vanco and cefepime  5.  Blood culture positive for staph aureus staph aureus suggestive      1 of 2-second cultures pending  Continue both antibiotics patient likely will be discharged on IV vancomycin for definitive surgery left below-knee amputation  History of noncompliance and uncontrolled diabetes mellitus    Feb 6  Patient is agreeable for left below-knee amputation Case discussed and patient examined bedside with the podiatry surgery resident  Vascular input pending for inpatient amputation versus outpatient will continue with IV antibiotics and pain control    Huma Brannon MD  2/6/2021  10:50 AM

## 2021-02-07 PROBLEM — M86.9 PYOGENIC INFLAMMATION OF BONE (HCC): Status: ACTIVE | Noted: 2021-02-07

## 2021-02-07 LAB
ALBUMIN SERPL-MCNC: 2.7 G/DL (ref 3.5–5.2)
ALBUMIN/GLOBULIN RATIO: ABNORMAL (ref 1–2.5)
ALP BLD-CCNC: 85 U/L (ref 40–129)
ALT SERPL-CCNC: 5 U/L (ref 5–41)
ANION GAP SERPL CALCULATED.3IONS-SCNC: 9 MMOL/L (ref 9–17)
AST SERPL-CCNC: 6 U/L
BILIRUB SERPL-MCNC: <0.15 MG/DL (ref 0.3–1.2)
BUN BLDV-MCNC: 15 MG/DL (ref 8–23)
BUN/CREAT BLD: ABNORMAL (ref 9–20)
CALCIUM SERPL-MCNC: 8.6 MG/DL (ref 8.6–10.4)
CHLORIDE BLD-SCNC: 108 MMOL/L (ref 98–107)
CO2: 24 MMOL/L (ref 20–31)
CREAT SERPL-MCNC: 0.76 MG/DL (ref 0.7–1.2)
GFR AFRICAN AMERICAN: >60 ML/MIN
GFR NON-AFRICAN AMERICAN: >60 ML/MIN
GFR SERPL CREATININE-BSD FRML MDRD: ABNORMAL ML/MIN/{1.73_M2}
GFR SERPL CREATININE-BSD FRML MDRD: ABNORMAL ML/MIN/{1.73_M2}
GLUCOSE BLD-MCNC: 136 MG/DL (ref 75–110)
GLUCOSE BLD-MCNC: 157 MG/DL (ref 70–99)
GLUCOSE BLD-MCNC: 190 MG/DL (ref 75–110)
GLUCOSE BLD-MCNC: 218 MG/DL (ref 75–110)
GLUCOSE BLD-MCNC: 248 MG/DL (ref 75–110)
GLUCOSE BLD-MCNC: 252 MG/DL (ref 75–110)
HCT VFR BLD CALC: 27 % (ref 41–53)
HEMOGLOBIN: 8.5 G/DL (ref 13.5–17.5)
MCH RBC QN AUTO: 26.1 PG (ref 26–34)
MCHC RBC AUTO-ENTMCNC: 31.6 G/DL (ref 31–37)
MCV RBC AUTO: 82.6 FL (ref 80–100)
NRBC AUTOMATED: ABNORMAL PER 100 WBC
PDW BLD-RTO: 16.8 % (ref 11.5–14.9)
PLATELET # BLD: 438 K/UL (ref 150–450)
PMV BLD AUTO: 7.1 FL (ref 6–12)
POTASSIUM SERPL-SCNC: 4.2 MMOL/L (ref 3.7–5.3)
RBC # BLD: 3.26 M/UL (ref 4.5–5.9)
SODIUM BLD-SCNC: 141 MMOL/L (ref 135–144)
TOTAL PROTEIN: 6.5 G/DL (ref 6.4–8.3)
VANCOMYCIN TROUGH DATE LAST DOSE: ABNORMAL
VANCOMYCIN TROUGH DOSE AMOUNT: ABNORMAL
VANCOMYCIN TROUGH TIME LAST DOSE: 836
VANCOMYCIN TROUGH: 22.7 UG/ML (ref 10–20)
WBC # BLD: 9.4 K/UL (ref 3.5–11)

## 2021-02-07 PROCEDURE — 2580000003 HC RX 258: Performed by: INTERNAL MEDICINE

## 2021-02-07 PROCEDURE — 2580000003 HC RX 258: Performed by: NURSE PRACTITIONER

## 2021-02-07 PROCEDURE — 2060000000 HC ICU INTERMEDIATE R&B

## 2021-02-07 PROCEDURE — 80053 COMPREHEN METABOLIC PANEL: CPT

## 2021-02-07 PROCEDURE — 99232 SBSQ HOSP IP/OBS MODERATE 35: CPT | Performed by: INTERNAL MEDICINE

## 2021-02-07 PROCEDURE — 36415 COLL VENOUS BLD VENIPUNCTURE: CPT

## 2021-02-07 PROCEDURE — 6360000002 HC RX W HCPCS: Performed by: NURSE PRACTITIONER

## 2021-02-07 PROCEDURE — 80202 ASSAY OF VANCOMYCIN: CPT

## 2021-02-07 PROCEDURE — 6360000002 HC RX W HCPCS: Performed by: INTERNAL MEDICINE

## 2021-02-07 PROCEDURE — 82947 ASSAY GLUCOSE BLOOD QUANT: CPT

## 2021-02-07 PROCEDURE — 6370000000 HC RX 637 (ALT 250 FOR IP): Performed by: NURSE PRACTITIONER

## 2021-02-07 PROCEDURE — 85027 COMPLETE CBC AUTOMATED: CPT

## 2021-02-07 RX ADMIN — SODIUM CHLORIDE: 9 INJECTION, SOLUTION INTRAVENOUS at 19:36

## 2021-02-07 RX ADMIN — BUDESONIDE AND FORMOTEROL FUMARATE DIHYDRATE 2 PUFF: 160; 4.5 AEROSOL RESPIRATORY (INHALATION) at 19:50

## 2021-02-07 RX ADMIN — MORPHINE SULFATE 4 MG: 4 INJECTION, SOLUTION INTRAMUSCULAR; INTRAVENOUS at 11:25

## 2021-02-07 RX ADMIN — INSULIN LISPRO 2 UNITS: 100 INJECTION, SOLUTION INTRAVENOUS; SUBCUTANEOUS at 19:50

## 2021-02-07 RX ADMIN — HEPARIN SODIUM 5000 UNITS: 5000 INJECTION INTRAVENOUS; SUBCUTANEOUS at 14:14

## 2021-02-07 RX ADMIN — CEFEPIME 2000 MG: 2 INJECTION, POWDER, FOR SOLUTION INTRAVENOUS at 14:13

## 2021-02-07 RX ADMIN — OXYCODONE HYDROCHLORIDE AND ACETAMINOPHEN 1 TABLET: 5; 325 TABLET ORAL at 22:44

## 2021-02-07 RX ADMIN — SODIUM CHLORIDE, PRESERVATIVE FREE 10 ML: 5 INJECTION INTRAVENOUS at 07:17

## 2021-02-07 RX ADMIN — INSULIN GLARGINE 25 UNITS: 100 INJECTION, SOLUTION SUBCUTANEOUS at 07:16

## 2021-02-07 RX ADMIN — INSULIN GLARGINE 25 UNITS: 100 INJECTION, SOLUTION SUBCUTANEOUS at 20:51

## 2021-02-07 RX ADMIN — INSULIN LISPRO 2 UNITS: 100 INJECTION, SOLUTION INTRAVENOUS; SUBCUTANEOUS at 11:25

## 2021-02-07 RX ADMIN — Medication 1 CAPSULE: at 19:50

## 2021-02-07 RX ADMIN — VANCOMYCIN HYDROCHLORIDE 1250 MG: 1.25 INJECTION, POWDER, LYOPHILIZED, FOR SOLUTION INTRAVENOUS at 08:36

## 2021-02-07 RX ADMIN — CEFEPIME 2000 MG: 2 INJECTION, POWDER, FOR SOLUTION INTRAVENOUS at 22:14

## 2021-02-07 RX ADMIN — INSULIN LISPRO 6 UNITS: 100 INJECTION, SOLUTION INTRAVENOUS; SUBCUTANEOUS at 17:37

## 2021-02-07 RX ADMIN — Medication 1 CAPSULE: at 07:16

## 2021-02-07 RX ADMIN — HEPARIN SODIUM 5000 UNITS: 5000 INJECTION INTRAVENOUS; SUBCUTANEOUS at 05:22

## 2021-02-07 RX ADMIN — DAKIN'S SOLUTION 0.125% (QUARTER STRENGTH): 0.12 SOLUTION at 07:17

## 2021-02-07 RX ADMIN — CEFEPIME 2000 MG: 2 INJECTION, POWDER, FOR SOLUTION INTRAVENOUS at 07:03

## 2021-02-07 RX ADMIN — OXYCODONE HYDROCHLORIDE AND ACETAMINOPHEN 1 TABLET: 5; 325 TABLET ORAL at 10:04

## 2021-02-07 RX ADMIN — ONDANSETRON 4 MG: 2 INJECTION INTRAMUSCULAR; INTRAVENOUS at 06:57

## 2021-02-07 RX ADMIN — FAMOTIDINE 20 MG: 20 TABLET ORAL at 07:16

## 2021-02-07 RX ADMIN — FAMOTIDINE 20 MG: 20 TABLET ORAL at 19:50

## 2021-02-07 RX ADMIN — MORPHINE SULFATE 4 MG: 4 INJECTION, SOLUTION INTRAMUSCULAR; INTRAVENOUS at 05:22

## 2021-02-07 RX ADMIN — BUDESONIDE AND FORMOTEROL FUMARATE DIHYDRATE 2 PUFF: 160; 4.5 AEROSOL RESPIRATORY (INHALATION) at 07:03

## 2021-02-07 RX ADMIN — MORPHINE SULFATE 4 MG: 4 INJECTION, SOLUTION INTRAMUSCULAR; INTRAVENOUS at 19:50

## 2021-02-07 RX ADMIN — HEPARIN SODIUM 5000 UNITS: 5000 INJECTION INTRAVENOUS; SUBCUTANEOUS at 20:50

## 2021-02-07 ASSESSMENT — PAIN DESCRIPTION - PAIN TYPE: TYPE: ACUTE PAIN

## 2021-02-07 ASSESSMENT — PAIN DESCRIPTION - ORIENTATION: ORIENTATION: LEFT

## 2021-02-07 ASSESSMENT — PAIN SCALES - GENERAL
PAINLEVEL_OUTOF10: 7
PAINLEVEL_OUTOF10: 7
PAINLEVEL_OUTOF10: 9
PAINLEVEL_OUTOF10: 5
PAINLEVEL_OUTOF10: 4

## 2021-02-07 NOTE — DISCHARGE INSTR - COC
Continuity of Care Form    Patient Name: Walter Hurley   :  1955  MRN:  [id number]    Admit date:  2/3/2021  Discharge date:  ***    Code Status Order: Full Code   Advance Directives:   Advance Care Flowsheet Documentation     Date/Time Healthcare Directive Type of Healthcare Directive Copy in [address] Agent's Name Healthcare Agent's Phone Number    21 1529  -- --  Yes, copy in chart  Adult [address]  [phone]    21 2335  No, patient does not have an advance directive for healthcare treatment --  --  --  --  --          Admitting Physician:  Joi Tate MD  PCP: Kendra Garcia DO    Discharging Nurse: [facility] Unit/Room#: 2117/2117-01  Discharging Unit Phone Number: ***    Emergency Contact:   Extended Emergency Contact Information  Primary Emergency Contact: Fartun Baeza  Address: [place] [address] Phone: [phone]  Relation: Parent  Secondary Emergency Contact: VIGNESH Parsons [address] [place] [address] Phone: [phone]  Mobile Phone: [phone]  Relation: Child    Past Surgical History:  Past Surgical History:   Procedure Laterality Date    COLONOSCOPY  2015    hyperplastic polyp    COLONOSCOPY  2017    ESOPHAGECTOMY      cancer    FOOT DEBRIDEMENT Left 2021    I&D LEFT FOOT WITH REMOVAL OF NONVIABLE BONE AND SOFT TISSUE performed by Leena Helton DPM at [facility] Left 2021    LEFT FOOT DEBRIDEMENT WITH REMOVAL ALL NON VIABLE SOFT TISSUE AND BONE performed by Leena Helton DPM at [address] [place] [address] Right 2016    I & D heel    FOOT SURGERY Right 2016    I & D    FRACTURE SURGERY Left 2015    humerus left, left leg    IR INS PICC VAD W SQ PORT GREATER THAN 5  2020    IR INS PICC VAD W SQ PORT GREATER THAN 5 2020 [facility]    IR INS PICC VAD W SQ PORT GREATER THAN 5  2021 IR INS PICC VAD W SQ PORT GREATER THAN 5 1/11/2021 [facility]    LEG AMPUTATION BELOW KNEE Right 01/21/2017    TOE AMPUTATION Right 2014    rt 3rd through 5th digits    TOE AMPUTATION Left 5/26/2016    left foot 5th toe    TOE AMPUTATION Left 8/5/2020    FOOT TRANSMETATARSAL  AMPUTATION - AND LEFT PECUTANEOUS TENDO ACHILLES LENGTHENING performed by Martin Echeverria DPM at [address] TOE AMPUTATION Left 8/24/2020    REVISION  TRANSMETATARSAL AMPUTATION WITH DEBRIDEMENT. performed by Martin Echeverria DPM at [facility]      5/14/13- with dilation    VASCULAR SURGERY Right 01/16/2017    foot guillotine amputation       Immunization History:   Immunization History   Administered Date(s) Administered    Influenza Vaccine, unspecified formulation 10/10/2016    Influenza Virus Vaccine 11/03/2014, 10/29/2015    Influenza, Cele Sabins, IM, (6 mo and older Fluzone, Flulaval, Fluarix and 3 yrs and older Afluria) 10/10/2016    Pneumococcal Polysaccharide ([id number]) 09/05/2012, 10/29/2015    Tdap (Boostrix, Adacel) 07/01/2019       Active Problems:  Patient Active Problem List   Diagnosis Code    Type 2 diabetes mellitus with diabetic polyneuropathy, with long-term current use of insulin ([facility]) E11.42, Z79.4    Neuropathic pain, leg M79.2    Diabetic polyneuropathy ([facility] E11.42    Allergic rhinitis J30.9    Tobacco dependence F17.200    Edentulous K08.109    Dysphagia R13.10    Lung nodules R91.8    Esophageal cancer ([facility]) C15.9    Fracture of humerus, left, closed S42.302A    Closed fracture of humerus S42.309A    DM type 2 with diabetic peripheral neuropathy ([facility]) E11.42    Chronic obstructive pulmonary disease ([facility]) J44.9    HLD (hyperlipidemia) E78.5    Gastroesophageal reflux disease K21.9    Chronic pain syndrome G89.4    Marijuana use F12.90    History of colon polyps Z86.010    Functional diarrhea K59.1    Sepsis due to methicillin resistant Staphylococcus aureus (MRSA) ([facility] A41.02    History of esophageal cancer Z85.01    Osteomyelitis, chronic, ankle or foot ([facility] M86.679    PAD (peripheral artery disease) ([facility]) I73.9    Other pulmonary embolism without acute cor pulmonale ([facility]) I26.99    Carotid stenosis, asymptomatic, bilateral I65.23    Lower limb amputation status Z89.619    Fx humeral neck, right, closed, initial encounter S42.211A    Transient hypotension I95.9    Constipation due to opioid therapy K59.03, T40.2X5A    Acute kidney injury ([facility] N17.9    Diabetic ulcer of left midfoot associated with type 2 diabetes mellitus, with fat layer exposed ([facility] E11.621, M36.808    Complication of below knee amputation stump ([facility]) T87.9    COPD exacerbation ([facility]) J44.1    Hyponatremia E87.1    Pain, phantom limb ([facility]) G54.6    Below-knee amputation of right lower extremity ([facility]) W64.691O    Moderate protein malnutrition ([facility]) E44.0    Essential hypertension I10    Uncontrolled type 2 diabetes mellitus with hyperglycemia ([facility]) E11.65    History of pulmonary embolism - 2017 Z86. 80    Atelectasis J98.11    Uncontrolled type 2 diabetes mellitus with foot ulcer ([facility]) E11.621, L97.509, E11.65    Azotemia R79.89    Anemia, normocytic normochromic D64.9    Drug-induced constipation K59.03    Osteomyelitis of metatarsals of left foot ([facility]) M86.9    Diabetic foot infection ([facility]) E11.628, L08.9    Noncompliance Z91.19    Type 1 diabetes mellitus with diabetic foot infection ([facility] E10.628, L08.9    Acute osteomyelitis of left foot ([facility]) M86.172    Cellulitis of left foot L03. 116    Mild malnutrition ([facility]) E44.1    Diabetic infection of left foot ([facility]) E11.628, L08.9    Hypocalcemia E83.51    Hypokalemia E87.6    Moderate malnutrition ([facility]) E44.0    Diabetic ulcer of left midfoot associated with type 2 diabetes mellitus, with necrosis of bone ([facility]) E11.621, L97.424    History of below knee amputation, right ([facility] Z89.511    Foot ulcer with fat layer exposed, left ([facility] L97.522    Chronic refractory osteomyelitis of left foot ([facility] M86.672    Sepsis ([facility] A41.9       Isolation/Infection:   Isolation          Contact        Patient Infection Status     Infection Onset Added Last Indicated Last Indicated By Review Planned Expiration Resolved Resolved By    MRSA 02/03/21 02/05/21 02/03/21 Culture, Blood 1        2/2021 blood    VRE 08/24/20 08/27/20 08/24/20 Culture, Anaerobic and Aerobic        Foot - 12/2020    Resolved    C-diff Rule Out 01/11/21 01/11/21 01/11/21 C DIFF TOXIN/ANTIGEN (Ordered)   02/05/21 Rico Botello RN    COVID-19 Rule Out 08/22/20 08/22/20 08/23/20 COVID-19 (Ordered)   08/23/20 Rule-Out Test Resulted    COVID-19 Rule Out 08/01/20 08/01/20 08/01/20 COVID-19 (Ordered)   08/02/20 Edda Grande RN    Test discontinued - negative result this admission    MRSA  01/02/17 01/02/17 Edda Grande RN   07/31/20 Edda Grande RN    2 negative nasal screen - 2020  Foot - 6/2018          Nurse Assessment:  Last Vital Signs: /60   Pulse 110   Temp 98.2 °F (36.8 °C) (Oral)   Resp 20   Ht 6' 1\" (1.854 m)   Wt 151 lb 7.3 oz (68.7 kg)   SpO2 95%   BMI 19.98 kg/m²     Last documented pain score (0-10 scale): Pain Level: 4  Last Weight:   Wt Readings from Last 1 Encounters:   02/07/21 151 lb 7.3 oz (68.7 kg)     Mental Status:  {IP PT MENTAL STATUS:27287}    IV Access:  {[facility] IV ACCESS:965711144}    Nursing Mobility/ADLs:  Walking   {[facility] DME MNKT:043908073}  Transfer  {[facility] DME BZQR:392356889}  Bathing  {[facility] DME ZUIW:404723356}  Dressing  {[facility] DME OWVK:189522186}  Toileting  {[facility] DME NSXF:805513737}  Feeding  {[facility] DME GBMP:605924011}  Med Admin  {[facility] DME USHJ:861513612}  Med Delivery   {[facility] MED Delivery:977928148}    Wound Care Documentation and Therapy:  Wound 01/22/21 Foot Left;Lateral wound #1 left lateral foot (Active)   Wound Image   01/22/21 1013   Dressing Status Clean;Dry; Intact; New dressing applied 02/07/21 0809   Wound Cleansed Other (Comment) 02/06/21 1525   Dressing/Treatment Moist to dry 02/07/21 0809   Dressing Change Due 02/08/21 02/07/21 0809   Wound Length (cm) 6 cm 02/03/21 1350   Wound Width (cm) 6 cm 02/03/21 1350   Wound Depth (cm) 2 cm 02/03/21 1350   Wound Surface Area (cm^2) 36 cm^2 02/03/21 1350   Change in Wound Size % (l*w) 4.76 02/03/21 1350   Wound Volume (cm^3) 72 cm^3 02/03/21 1350   Wound Healing % 13 02/03/21 1350   Post-Procedure Length (cm) 6 cm 02/03/21 1350   Post-Procedure Width (cm) 6 cm 02/03/21 1350   Post-Procedure Depth (cm) 2 cm 02/03/21 1350   Post-Procedure Surface Area (cm^2) 36 cm^2 02/03/21 1350   Post-Procedure Volume (cm^3) 72 cm^3 02/03/21 1350   Wound Assessment Exposed structure fascia;Pink/red 02/07/21 0809   Drainage Amount None 02/07/21 0809   Drainage Description Serosanguinous; Yellow;Pink 02/06/21 1525   Odor Mild 02/06/21 1525   Odalys-wound Assessment Maceration; Non-blanchable erythema 02/07/21 0809   Margins Flat/open edges 02/06/21 1525   Wound Thickness Description not for Pressure Injury Full thickness 02/06/21 1525   Number of days: 16       Wound 01/22/21 Foot Anterior; Left WOUND # 2 LEFT DORSAL FOOT (Active)   Wound Etiology Diabetic Jones 2 01/25/21 1030   Dressing Status Clean;Dry; Intact; New dressing applied 02/07/21 0809   Wound Cleansed Cleansed with saline 02/04/21 0044   Dressing/Treatment ABD; Moist to dry 02/07/21 0512   Offloading for Diabetic Foot Ulcers Yes (type) 01/22/21 1047   Wound Length (cm) 0 cm 02/03/21 1350   Wound Width (cm) 0 cm 02/03/21 1350   Wound Depth (cm) 0 cm 02/03/21 1350   Wound Surface Area (cm^2) 0 cm^2 02/03/21 1350   Change in Wound Size % (l*w) 100 02/03/21 1350   Wound Volume (cm^3) 0 cm^3 02/03/21 1350   Wound Healing % 100 02/03/21 1350   Post-Procedure Length (cm) 0 cm 02/03/21 1350   Post-Procedure Width (cm) 0 cm 02/03/21 1350   Post-Procedure Depth (cm) 0 cm 02/03/21 1350   Post-Procedure Surface Area (cm^2) 0 cm^2 21 1350   Post-Procedure Volume (cm^3) 0 cm^3 21 1350   Wound Assessment Erythema; Exposed structure bone;Pink/red 21 0809   Drainage Amount None 21 0809   Drainage Description Serosanguinous; Yellow;Pink 21 1526   Odor None 21 0809   Odalys-wound Assessment Maceration; Non-blanchable erythema 21 1526   Margins Flat/open edges 21 1526   Wound Thickness Description not for Pressure Injury Full thickness 21 1526   Number of days: 16        Elimination:  Continence:   · Bowel: {YES / WZ:79799}  · Bladder: {YES / AC:01632}  Urinary Catheter: {Urinary Catheter:402963920}   Colostomy/Ileostomy/Ileal Conduit: {YES / KN:51903}       Date of Last BM: ***    Intake/Output Summary (Last 24 hours) at 2021 1402  Last data filed at 2021 6231  Gross per 24 hour   Intake --   Output 1900 ml   Net -1900 ml     I/O last 3 completed shifts:  In: -   Out: 2300 [Urine:2300]    Safety Concerns:     [address] Safety Concerns:628392475}    Impairments/Disabilities:      [address] Impairments/Disabilities:967439935}    Nutrition Therapy:  Current Nutrition Therapy:   [address] [employer] Diet List:279063387}    Routes of Feeding: {CHP DME Other Feedings:588612134}  Liquids: {Slp liquid thickness:04050}  Daily Fluid Restriction: {CHP DME Yes amt example:856571558}  Last Modified Barium Swallow with Video (Video Swallowing Test): {Done Not Done HEJN:880997216}    Treatments at the Time of Hospital Discharge:   Respiratory Treatments: ***  Oxygen Therapy:  {Therapy; copd oxygen:26625}  Ventilator:    { YUMI Vent HDON:728841937}    Rehab Therapies: {THERAPEUTIC INTERVENTION:7179141758}  Weight Bearing Status/Restrictions: { CC Weight Bearin}  Other Medical Equipment (for information only, NOT a DME order):  {EQUIPMENT:440323789}  Other Treatments: Skilled Nursing Assessment and monitoring, Medication Education    Patient's personal belongings (please select all that are sent with patient):  {CHP DME Belongings:381835668}    RN SIGNATURE:  {Esignature:031250109}    CASE MANAGEMENT/SOCIAL WORK SECTION    Inpatient Status Date: 2/3/2021    Readmission Risk Assessment Score:  Readmission Risk              Risk of Unplanned Readmission:        53           Discharging to Facility/ Agency   . [address]    Please refer patient to [facility] when discharged from your facility for home health services. [address]  [address]  [address] [address]  Phone [phone]  Fax  [phone]    Dialysis Facility (if applicable)   · Name:  · Address:  · Dialysis Schedule:  · Phone:  · Fax:    / signature: Electronically signed by Aleksandr Jimenez RN on 2/7/21 at 2:03 PM EST    PHYSICIAN SECTION    Prognosis: {Prognosis:6202109510}    Condition at Discharge: Peyman Morales Patient Condition:699714839}    Rehab Potential (if transferring to Rehab): {Prognosis:9907745053}    Recommended Labs or Other Treatments After Discharge: ***    Physician Certification: I certify the above information and transfer of Shaquille Dhaliwal  is necessary for the continuing treatment of the diagnosis listed and that he requires {Admit to Appropriate Level of Care:47150} for {GREATER/LESS:948091121} 30 days.      Update Admission H&P: {CHP DME Changes in KHUOM:803776064}    PHYSICIAN SIGNATURE:  {Esignature:035053753}

## 2021-02-07 NOTE — PROGRESS NOTES
Nurse updated daughter on condition. Daughter was aggravated nurse was going to remove PICC line due to patient leaving AMA. Daughter was condoning patient belittling nurses with derogatory names due to home life and family issues. Nurse transferred daughter to house supervisor due to her request Pain medication administered per patient request. Patient talked to daughter on nurse phone. Pt is not leaving AMA at this time.  Will continue to monitor
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
## 2021-02-07 NOTE — PROGRESS NOTES
Progress Note  Podiatric Medicine and Surgery     Subjective     CC: L foot wound    Patient seen and examined at bedside  Dressing changed and pt determined to leave today  Pain controlled    HPI :  Jae Bernard is a 63 y.o. male seen at [facility] for Left foot wound. Patient follows podiatrist Dr. Joyce Taylor for Diabetic wound care. Has history of multiple AMA leaves from previous admissions. Notes [facility] has not been giving him abx or applying wound vacs properly. Previous notes from [facility] AT [facility] state pt has been noncompliant and removing vac after placement and walking on L foot despite instructions note to. Was seen in [facility] yesterday and recommended direct admission however no beds available. Pt was brought to ED and then left hospital. Arrived to ED later that night and was noted to be disruptive and verbally abusive towards staff. He has been making strides in his understanding that he will likely need a BKA. PCP is Mayra Perez DO    ROS: Denies N/V/F/C/SOB/CP. Otherwise negative except at stated in the HPI.      Medications:  Scheduled Meds:   heparin (porcine)  5,000 Units Subcutaneous 3 times per day    vancomycin  1,250 mg Intravenous Q12H    insulin lispro  0-12 Units Subcutaneous TID WC    insulin lispro  0-6 Units Subcutaneous Nightly    cefepime  2,000 mg Intravenous Q8H    sodium hypochlorite   Irrigation Daily    vancomycin (VANCOCIN) intermittent dosing (placeholder)   Other RX Placeholder    budesonide-formoterol  2 puff Inhalation BID    famotidine  20 mg Oral BID    insulin glargine  25 Units Subcutaneous BID    lactobacillus  1 capsule Oral BID    sodium chloride flush  10 mL Intravenous 2 times per day    [Held by provider] apixaban  5 mg Oral BID       Continuous Infusions:   dextrose 100 mL/hr (02/05/21 1457)    sodium chloride 150 mL/hr at 02/06/21 2058       PRN Meds:glucose, dextrose, glucagon (rDNA), dextrose, ondansetron, sodium chloride flush, oxyCODONE-acetaminophen, sodium chloride flush, acetaminophen **OR** acetaminophen, morphine **OR** morphine    Objective     Vitals:  Patient Vitals for the past 8 hrs:   BP Temp Temp src Pulse Resp SpO2 Weight   21 0700 (!) 126/90 98.1 °F (36.7 °C) Oral 85 20 91 % --   21 0045 (!) 121/48 98.1 °F (36.7 °C) Oral 86 16 94 % --   21 0012 -- -- -- -- -- -- 151 lb 7.3 oz (68.7 kg)     Average, Min, and Max for last 24 hours Vitals:  TEMPERATURE:  Temp  Av.1 °F (36.7 °C)  Min: 98 °F (36.7 °C)  Max: 98.2 °F (36.8 °C)    RESPIRATIONS RANGE: Resp  Av.8  Min: 16  Max: 20    PULSE RANGE: Pulse  Av  Min: 85  Max: 96    BLOOD PRESSURE RANGE:  Systolic (92UFX), PFV:402 , Min:121 , RIGO:112   ; Diastolic (95JBK), YLJ:38, Min:48, Max:90      PULSE OXIMETRY RANGE: SpO2  Av.8 %  Min: 91 %  Max: 97 %    I/O last 3 completed shifts:  In: -   Out: 2300 [Urine:2300]    CBC:  Recent Labs     21  0821  0500   WBC 6.3 7.8 9.4   HGB 8.9* 8.7* 8.5*   HCT 28.7* 26.8* 27.0*    390 438        BMP:  Recent Labs     21  0813 21  04521  0500    136 141   K 3.8 3.8 4.2    104 108*   CO2 25 25 24   BUN 11 14 15   CREATININE 0.62* 0.76 0.76   GLUCOSE 167* 151* 157*   CALCIUM 8.5* 8.6 8.6        Coags:  Recent Labs     21  0821  04521  0500   PROT 6.5 6.5 6.5       Lab Results   Component Value Date    SEDRATE 36 (H) 2021     No results for input(s): CRP in the last 72 hours. Lower Extremity Physical Exam:  Vascular: DP and PT pulses are not palpable. CFT <5 seconds to distal stump. Hair growth is absent to the level of the remaining digits. Non-pitting edema.       Neuro: Saph/sural/SP/DP/plantar sensation absent to light touch. Insensate to feet.     Musculoskeletal: Muscle strength is 4/5 to all lower extremity muscle groups. Gross deformity is s/p L TMA with 5th ray amputation; R BKA.  Neg pain on calf squeeze. All LE muscle compartments soft and compressible.      Dermatologic: Left Lateral left amputation stump ulceration measures approximately 5.6 cm x 5.9.0cm x 2.0cm. Base is fibro-necrotic. Exposed cuboid. Serous drainage noted with associated mal odor. Erythema absent. No fluctuance, crepitus, or induration. Clinical Images:          Imaging:   MRI FOOT LEFT W WO CONTRAST   Final Result   1. Lateral soft tissue wound distally with underlying osteomyelitis of the   residual 4th metatarsal and cuboid. No organized drainable fluid collection   identified. Subcutaneous edema and mild postcontrast enhancement the soft   tissues suggestive of cellulitis. 2. Patchy edema and associated patchy postcontrast enhancement involving the   medial, intermediate, and lateral cuneiforms and bases of the 2nd and 3rd   metatarsals. T1 marrow signal is grossly preserved findings may reflect   reactive noninfectious osteitis, however, early changes of osteomyelitis   difficult to entirely exclude. XR FOOT LEFT (MIN 3 VIEWS)   Final Result   Postsurgical changes as above. Large soft tissue ulceration lateral aspect   of the foot is well as soft tissue swelling distally and ulceration along the   dorsal foot. Lucencies in the soft tissues concerning for gas. While there is no discrete bony erosion, given proximity of soft tissue   changes underlying osteomyelitis is of concern. Cultures:   Blood (2/3/21): MRSA  Foot (2/3/21): lactose fermenting gram negative rods, SA light growth    Assessment   Aurora Garcia is a 63 y.o. male with   1. Osteomyelitis cuboid; fourth metatarsal left foot with possible osteomyelitis of cuneiforms  2. DM2 with peripheral neuropathy  3. 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 S/p BKA R  5. PAD  6.  Noncompliance with medical advice    Principal Problem:    Sepsis ([address]  Active Problems:    Tobacco dependence    Uncontrolled type 2 diabetes mellitus with hyperglycemia ([facility] [address]    Noncompliance    Acute osteomyelitis of left foot ([facility] [address]  Resolved Problems:    * No resolved hospital problems. *       Plan     · Continue medical management per primary team - discussed case with Dr. Priyanka Massey and he is amenable to transfer of patient to [facility] - pt insists on driving himself  · Gravity of situation discussed at length  · MRI reviewed - No drainable abscess, suspicious for OM cuboid, 4th met. Early OM to cuneiforms, 2nd and 3rd mets. · Vasc - available to schedule for outpatient BKA - pt requests Dr Catherine Pitt  · Pt understands risk of life threatening sepsis and positive blood culture result and the importance of a BKA on the right side. · Discussed Moderate femoropopliteal tib peroneal occlusive disease LLE.  L-0.51  · ID consulted  ? Cefepime/Vanco until amputation is completed followed by oral abx  ? Cx - as listed above  · Discussed results of MRI results yesterday and today with pt. Discussed conservative options vs proximal amputation. Foot is nonsalvagable from podiatric surgery standpoint. Proximal amputation recommended agreed upon  · Dressing applied to Left foot: Dakin's WTD, DSD  · Orders placed for wound care: daily dressing change: Dakin's WTD, DSD.  Light ACE  · Will discuss with Dr. Emmanuel Madera DPM   Podiatric Medicine & Surgery   2/7/2021 at 7:21 AM

## 2021-02-07 NOTE — PROGRESS NOTES
Nurse attempted multiple physicians to find the on call physician for Dr Luis Fernando Mari.  Neither physician was on call for [facility] notified of the issue

## 2021-02-07 NOTE — CARE COORDINATION
Writer called [facility] to get an update on transfer. Patient has been accepted by internal medicine physician dr Perez Malagon at [facility]. [facility] is waiting on a bed. They will call PCU unit one a bed becomes available.     Electronically signed by Madiha Lopez RN on 2/7/2021 at 4:51 PM

## 2021-02-07 NOTE — PROGRESS NOTES
Pt is very argumentative with the nursing staff. He is calling the nurse \"Bitches\" Pt is threatening to leave AMA at this time. Pt states, \"He wants his surgery at [facility]" Dr Portillo Walton and Dr Faheem Hernandez aware of patient leaving AMA. Patient signed AMA paper. Pt refusing to let the nurse remove the PICC line at this time. Will continue to monitor.

## 2021-02-07 NOTE — PROGRESS NOTES
Nurse changed dressing to left foot due to patient bearing weight on it to go to the bathroom. Pt refuses to listen to nurses education. Break through drainage was present.

## 2021-02-07 NOTE — PROGRESS NOTES
Writer updated the patient that he would be picked up at 0700 and transported to [facility] for his BKA.  Writer also updated the patient's daughter Mattie Mcburney per the patient's request.

## 2021-02-07 NOTE — PROGRESS NOTES
Pharmacy Vancomycin Consult    Vancomycin Day: 5  Current Dosin mg every 12 hours    Temp max:  98.2 F    Recent Labs     21  0452 21  0500   CREATININE 0.76 0.76     Estimated Creatinine Clearance: 97 mL/min (based on SCr of 0.76 mg/dL). Recent Labs     21  0452 21  0500   WBC 7.8 9.4       Culture Date       Source                   Results  See micro    Level ordered for: 2030    Assessment/Plan:  Next dose will be based on vanco trough.   [facility]    2021   2:09 PM

## 2021-02-07 NOTE — CARE COORDINATION
ONGOING DISCHARGE PLAN:    Per Chart notes - LSW following for rehab to [facility]. Would need a pre cert. Per Dr Basilia Leiva notes - Patient is very rude and abusive to the staff threatening to leave [address] We have paged vascular surgery again to have a definitive plan for surgery. Patient is interested in getting amputation left below-knee at [facility]. We will make arrangements for transfer once we hear from vascular surgery Case discussed with podiatry surgeon agreed with [facility]. Writer called [facility] at [phone] and initiated transfer. Waiting on a physician to accept patient for transfer to [facility] per patients request.    Patient refusing to wear telemetry    Remains on Iv Vanco, Iv fluids    Will continue to follow for additional discharge needs.     Electronically signed by Guera Lawson RN on 2/7/2021 at 1:46 PM

## 2021-02-07 NOTE — PROGRESS NOTES
Dr Zeferino Pozo paged to see if he will accept patient so primary can intaite transfer to [facility]
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
## 2021-02-07 NOTE — PROGRESS NOTES
[place] [address] Internal Medicine    Progress Note     2/7/2021    9:58 AM    Name:   Josseline Horvath  MRN:     [id number]     Acct:      [de-identified]   Room:   [address] [place] [address] Day:  4  Admit Date:  2/3/2021  8:18 PM    PCP:   Toney Kayser, DO  Code Status:  Full Code    Subjective:     C/C:   Chief Complaint   Patient presents with    Wound Check     Principal Problem:    Sepsis ([address]  Active Problems:    Tobacco dependence    Uncontrolled type 2 diabetes mellitus with hyperglycemia ([facility] [address]    Noncompliance    Acute osteomyelitis of left foot ([facility] [address]  Resolved Problems:    * No resolved hospital problems. *      Interval History Status: improved.        Night no fever chills blood culture 1 of 2 is positive for staph aureus    Patient very rude and abusive to the staff threatening to leave AGAINST MEDICAL ADVICE     Significant last 24 hr data reviewed ;   Vitals:    02/06/21 1930 02/07/21 0012 02/07/21 0045 02/07/21 0700   BP: (!) 163/80  (!) 121/48 (!) 126/90   Pulse: 90  86 85   Resp: 18  16 20   Temp: 98.2 °F (36.8 °C)  98.1 °F (36.7 °C) 98.1 °F (36.7 °C)   TempSrc: Oral  Oral Oral   SpO2: 95%  94% 91%   Weight:  151 lb 7.3 oz (68.7 kg)     Height:          Recent Results (from the past 24 hour(s))   POC Glucose Fingerstick    Collection Time: 02/06/21 11:17 AM   Result Value Ref Range    POC Glucose 235 (H) 75 - 110 mg/dL   POC Glucose Fingerstick    Collection Time: 02/06/21  4:15 PM   Result Value Ref Range    POC Glucose 269 (H) 75 - 110 mg/dL   POC Glucose Fingerstick    Collection Time: 02/06/21  5:19 PM   Result Value Ref Range    POC Glucose 261 (H) 75 - 110 mg/dL   POC Glucose Fingerstick    Collection Time: 02/06/21  7:20 PM   Result Value Ref Range    POC Glucose 249 (H) 75 - 110 mg/dL   POC Glucose Fingerstick    Collection Time: 02/07/21  1:37 AM   Result Value Ref Range    POC Glucose 218 (H) 75 - 110 mg/dL   Comprehensive Metabolic Panel w/ Reflex to MG    Collection Time: 02/07/21  5:00 AM   Result Value Ref Range    Glucose 157 (H) 70 - 99 mg/dL    BUN 15 8 - 23 mg/dL    CREATININE 0.76 0.70 - 1.20 mg/dL    Bun/Cre Ratio NOT REPORTED 9 - 20    Calcium 8.6 8.6 - 10.4 mg/dL    Sodium 141 135 - 144 mmol/L    Potassium 4.2 3.7 - 5.3 mmol/L    Chloride 108 (H) 98 - 107 mmol/L    CO2 24 20 - 31 mmol/L    Anion Gap 9 9 - 17 mmol/L    Alkaline Phosphatase 85 40 - 129 U/L    ALT 5 5 - 41 U/L    AST 6 <40 U/L    Total Bilirubin <0.15 (L) 0.3 - 1.2 mg/dL    Total Protein 6.5 6.4 - 8.3 g/dL    Albumin 2.7 (L) 3.5 - 5.2 g/dL    Albumin/Globulin Ratio NOT REPORTED 1.0 - 2.5    GFR Non-African American >60 >60 mL/min    GFR African American >60 >60 mL/min    GFR Comment          GFR Staging NOT REPORTED    CBC    Collection Time: 02/07/21  5:00 AM   Result Value Ref Range    WBC 9.4 3.5 - 11.0 k/uL    RBC 3.26 (L) 4.5 - 5.9 m/uL    Hemoglobin 8.5 (L) 13.5 - 17.5 g/dL    Hematocrit 27.0 (L) 41 - 53 %    MCV 82.6 80 - 100 fL    MCH 26.1 26 - 34 pg    MCHC 31.6 31 - 37 g/dL    RDW 16.8 (H) 11.5 - 14.9 %    Platelets 786 777 - 803 k/uL    MPV 7.1 6.0 - 12.0 fL    NRBC Automated NOT REPORTED per 100 WBC   POC Glucose Fingerstick    Collection Time: 02/07/21  7:13 AM   Result Value Ref Range    POC Glucose 136 (H) 75 - 110 mg/dL     Recent Labs     02/06/21  1615 02/06/21  1719 02/06/21  1920 02/07/21  0137 02/07/21  0713   POCGLU 269* 261* 249* 218* 136*        Xr Foot Left (min 3 Views)    Result Date: 2/3/2021  EXAMINATION: THREE XRAY VIEWS OF THE LEFT FOOT 2/3/2021 8:56 pm COMPARISON: December 31, 2020 HISTORY: ORDERING SYSTEM PROVIDED HISTORY: foot infection evaluation for gas TECHNOLOGIST PROVIDED HISTORY: foot infection evaluation for gas Reason for Exam: foot infection, evaluation for gas Acuity: Acute Type of Exam: Initial FINDINGS: Interval resection of the 5th metatarsal.  Previous resections again noted involving the 1st through 4th digit distal to the mid shaft of the metatarsals. Large ulceration lateral aspect of the foot is well as soft tissue swelling distally. Lucencies noted in the soft tissues distally concerning for gas. Ulceration dorsal aspect of the foot. No discrete bony erosion. Postsurgical changes as above. Large soft tissue ulceration lateral aspect of the foot is well as soft tissue swelling distally and ulceration along the dorsal foot. Lucencies in the soft tissues concerning for gas. While there is no discrete bony erosion, given proximity of soft tissue changes underlying osteomyelitis is of concern. Mri Foot Left W Wo Contrast    Result Date: 2/4/2021  EXAMINATION: MRI OF THE LEFT FOOT WITH AND WITHOUT CONTRAST, 2/4/2021 2:25 pm TECHNIQUE: Multiplanar multisequence MRI of the left foot was performed with and without the administration of intravenous contrast. COMPARISON: Left foot radiograph February 3, 2021; MRI left foot January 4, 2020 HISTORY: ORDERING SYSTEM PROVIDED HISTORY: r/o deep abscess/ assess extent OM TECHNOLOGIST PROVIDED HISTORY: r/o deep abscess/ assess extent OM Reason for Exam: r/o deep abscess/ assess extent OM Acuity: Acute Type of Exam: Unknown Additional signs and symptoms: PT C/O INFECTION LATERAL SIDE OF LEFT FOOT X 3 MONTHS Relevant Medical/Surgical History: HX TOES AMPUTATED FINDINGS: LISFRANC JOINT:  Lisfranc ligament is visualized and is normal.  The alignment of the tarsal-metatarsal joint is anatomic. BONE MARROW: Pronounced marrow edema of the residual 4th metatarsal base and within the cuboid with associated confluent decreased T1 signal and prominent postcontrast enhancement. Findings consistent with osteomyelitis. The 5th metatarsal base has been resected. Edema and patchy postcontrast enhancement of the medial, intermediate, and lateral cuneiforms and bases of the 2nd and 3rd metatarsals with grossly preserved T1 signal at the sites.   Findings may reflect reactive noninfectious osteitis, however, very early changes of osteomyelitis difficult to entirely exclude. JOINTS: Mild midfoot osteoarthritis. No joint effusion. SOFT TISSUES: Soft tissue wound distally and laterally. Subcutaneous edema and postcontrast enhancement the soft tissues consistent with cellulitis. No organized drainable fluid collection identified. TENDONS: Postoperative changes of the distal flexor and extensor tendons. No tenosynovitis identified. 1. Lateral soft tissue wound distally with underlying osteomyelitis of the residual 4th metatarsal and cuboid. No organized drainable fluid collection identified. Subcutaneous edema and mild postcontrast enhancement the soft tissues suggestive of cellulitis. 2. Patchy edema and associated patchy postcontrast enhancement involving the medial, intermediate, and lateral cuneiforms and bases of the 2nd and 3rd metatarsals. T1 marrow signal is grossly preserved findings may reflect reactive noninfectious osteitis, however, early changes of osteomyelitis difficult to entirely exclude. HPI:   See history in H and P      Review of Systems:     Constitutional:  negative for chills, fevers, sweats  Respiratory:  negative for cough, dyspnea on exertion, hemoptysis, shortness of breath, wheezing  Cardiovascular:  negative for chest pain, chest pressure/discomfort, lower extremity edema, palpitations  Gastrointestinal:  negative for abdominal pain, constipation, diarrhea, nausea, vomiting  Neurological:  negative for dizziness, headache  Data:     Past Medical History:  no change     Social History:  no change    Family History: @no change    Vitals:      I/O (24Hr):     Intake/Output Summary (Last 24 hours) at 2/7/2021 0958  Last data filed at 2/7/2021 0658  Gross per 24 hour   Intake --   Output 1900 ml   Net -1900 ml       Labs:    URINE ANALYSIS:   Lab Results   Component Value Date    LABURIN 200 01/10/2019        CBC:  Lab Results   Component Value Date    WBC 9.4 02/07/2021    HGB 8.5 02/07/2021    PLT 438 2021     2012        BMP:    Lab Results   Component Value Date     2021    K 4.2 2021     2021    CO2 24 2021    BUN 15 2021    CREATININE 0.76 2021    GLUCOSE 157 2021    GLUCOSE 129 2012      LIVER PROFILE:  Lab Results   Component Value Date    ALT 5 2021    AST 6 2021    PROT 6.5 2021    BILITOT <0.15 2021    BILIDIR <0.08 2020    LABALBU 2.7 2021    LABALBU 4.4 2012               Radiology:  Medications:      Allergies:      Current Meds:   Scheduled Meds:    heparin (porcine)  5,000 Units Subcutaneous 3 times per day    vancomycin  1,250 mg Intravenous Q12H    insulin lispro  0-12 Units Subcutaneous TID WC    insulin lispro  0-6 Units Subcutaneous Nightly    cefepime  2,000 mg Intravenous Q8H    sodium hypochlorite   Irrigation Daily    vancomycin (VANCOCIN) intermittent dosing (placeholder)   Other RX Placeholder    budesonide-formoterol  2 puff Inhalation BID    famotidine  20 mg Oral BID    insulin glargine  25 Units Subcutaneous BID    lactobacillus  1 capsule Oral BID    sodium chloride flush  10 mL Intravenous 2 times per day    [Held by provider] apixaban  5 mg Oral BID     Continuous Infusions:    dextrose 100 mL/hr (21 1457)    sodium chloride 150 mL/hr at 218     PRN Meds: glucose, dextrose, glucagon (rDNA), dextrose, ondansetron, sodium chloride flush, oxyCODONE-acetaminophen, sodium chloride flush, acetaminophen **OR** acetaminophen, morphine **OR** morphine      Physical Examination:        BP (!) 126/90   Pulse 85   Temp 98.1 °F (36.7 °C) (Oral)   Resp 20   Ht 6' 1\" (1.854 m)   Wt 151 lb 7.3 oz (68.7 kg)   SpO2 91%   BMI 19.98 kg/m²   Temp (24hrs), Av.2 °F (36.8 °C), Min:98.1 °F (36.7 °C), Max:98.2 °F (36.8 °C)    Recent Labs     21  1719 21  1920 21  0137 21  0713   TRES 261* 249* 218* 136* Intake/Output Summary (Last 24 hours) at 2/7/2021 0958  Last data filed at 2/7/2021 0658  Gross per 24 hour   Intake --   Output 1900 ml   Net -1900 ml       General Appearance:  alert, well appearing, and in no acute distress  Mental status: oriented to person, place, and time with normal affect  Head:  normocephalic, atraumatic. Eye: no icterus, redness, pupils equal and reactive, extraocular eye movements intact, conjunctiva clear  Ear: normal external ear, no discharge, hearing intact  Nose:  no drainage noted  Mouth: mucous membranes moist  Neck: supple, no carotid bruits, thyroid not palpable  Lungs: Bilateral equal air entry, clear to ausculation, no wheezing, rales or rhonchi, normal effort  Cardiovascular: normal rate, regular rhythm, no murmur, gallop, rub. Abdomen: Soft, nontender, nondistended, normal bowel sounds, no hepatomegaly or splenomegaly  Neurologic: There are no new focal motor or sensory deficits, normal muscle tone and bulk, no abnormal sensation, normal speech, cranial nerves II through XII grossly intact  Skin: No gross lesions, rashes, bruising or bleeding on exposed skin area  Extremities:  peripheral pulses palpable, no pedal edema or calf pain with palpation  Left foot osteomyelitis history of right leg amputation  Psych: Normal mood      Assessment:        Primary Problem  Sepsis [facility])    Active Hospital Problems    Diagnosis Date Noted    Sepsis ([facility] [address] [A41.9] 02/03/2021    Acute osteomyelitis of left foot ([facility] [address] [M86.172] 11/03/2020    Noncompliance [Z91.19] 08/23/2020    Uncontrolled type 2 diabetes mellitus with hyperglycemia ([address] [E11.65] 02/24/2020    Tobacco dependence [F17.200]      Plan:        1. Left foot osteomyelitis staph aureus  2. Foot is nonsalvageable podiatry and vascular input noted  3. Patient is agreeable for outpatient left below-knee amputation  4. PICC line in place IV Vanco and cefepime  5.  Blood culture positive for staph aureus staph aureus suggestive      1 of 2-second cultures pending  Continue both antibiotics patient likely will be discharged on IV vancomycin for definitive surgery left below-knee amputation  History of noncompliance and uncontrolled diabetes mellitus    Feb 6  Patient is agreeable for left below-knee amputation Case discussed and patient examined bedside with the podiatry surgery resident  Vascular input pending for inpatient amputation versus outpatient will continue with IV antibiotics and pain control    Feb 7  Patient is very rude and abusive to the staff threatening to leave [address]  We have paged vascular surgery again to have a definitive plan for surgery  Patient is interested in getting amputation left below[facility]knee [facility]  We will make arrangements for transfer once we hear from vascular surgery  Case discussed with podiatry surgeon agreed with lawrence Georges MD  2/7/2021  9:58 AM

## 2021-02-08 ENCOUNTER — HOSPITAL ENCOUNTER (INPATIENT)
Age: 66
LOS: 4 days | Discharge: NURSING, REHAB OR LONG-TERM CARE | DRG: 854 | End: 2021-02-12
Attending: INTERNAL MEDICINE | Admitting: INTERNAL MEDICINE
Payer: MEDICARE

## 2021-02-08 VITALS
SYSTOLIC BLOOD PRESSURE: 143 MMHG | BODY MASS INDEX: 20.07 KG/M2 | HEART RATE: 92 BPM | DIASTOLIC BLOOD PRESSURE: 97 MMHG | WEIGHT: 151.46 LBS | RESPIRATION RATE: 18 BRPM | TEMPERATURE: 98.1 F | HEIGHT: 73 IN | OXYGEN SATURATION: 95 %

## 2021-02-08 DIAGNOSIS — S88.111A BELOW-KNEE AMPUTATION OF RIGHT LOWER EXTREMITY (HCC): Primary | Chronic | ICD-10-CM

## 2021-02-08 LAB
ALBUMIN SERPL-MCNC: 2.5 G/DL (ref 3.5–5.2)
ALBUMIN/GLOBULIN RATIO: ABNORMAL (ref 1–2.5)
ALP BLD-CCNC: 83 U/L (ref 40–129)
ALT SERPL-CCNC: 5 U/L (ref 5–41)
ANION GAP SERPL CALCULATED.3IONS-SCNC: 8 MMOL/L (ref 9–17)
AST SERPL-CCNC: 5 U/L
BILIRUB SERPL-MCNC: <0.15 MG/DL (ref 0.3–1.2)
BUN BLDV-MCNC: 14 MG/DL (ref 8–23)
BUN/CREAT BLD: ABNORMAL (ref 9–20)
CALCIUM SERPL-MCNC: 8.5 MG/DL (ref 8.6–10.4)
CHLORIDE BLD-SCNC: 107 MMOL/L (ref 98–107)
CO2: 25 MMOL/L (ref 20–31)
CREAT SERPL-MCNC: 0.82 MG/DL (ref 0.7–1.2)
CULTURE: ABNORMAL
GFR AFRICAN AMERICAN: >60 ML/MIN
GFR NON-AFRICAN AMERICAN: >60 ML/MIN
GFR SERPL CREATININE-BSD FRML MDRD: ABNORMAL ML/MIN/{1.73_M2}
GFR SERPL CREATININE-BSD FRML MDRD: ABNORMAL ML/MIN/{1.73_M2}
GLUCOSE BLD-MCNC: 174 MG/DL (ref 70–99)
GLUCOSE BLD-MCNC: 182 MG/DL (ref 75–110)
GLUCOSE BLD-MCNC: 268 MG/DL (ref 75–110)
HCT VFR BLD CALC: 25.9 % (ref 41–53)
HEMOGLOBIN: 8.1 G/DL (ref 13.5–17.5)
Lab: ABNORMAL
MCH RBC QN AUTO: 25.8 PG (ref 26–34)
MCHC RBC AUTO-ENTMCNC: 31.3 G/DL (ref 31–37)
MCV RBC AUTO: 82.3 FL (ref 80–100)
NRBC AUTOMATED: ABNORMAL PER 100 WBC
PDW BLD-RTO: 16.2 % (ref 11.5–14.9)
PLATELET # BLD: 454 K/UL (ref 150–450)
PMV BLD AUTO: 6.7 FL (ref 6–12)
POTASSIUM SERPL-SCNC: 4.3 MMOL/L (ref 3.7–5.3)
RBC # BLD: 3.15 M/UL (ref 4.5–5.9)
SODIUM BLD-SCNC: 140 MMOL/L (ref 135–144)
SPECIMEN DESCRIPTION: ABNORMAL
TOTAL PROTEIN: 6.1 G/DL (ref 6.4–8.3)
WBC # BLD: 9.6 K/UL (ref 3.5–11)

## 2021-02-08 PROCEDURE — 2580000003 HC RX 258: Performed by: NURSE PRACTITIONER

## 2021-02-08 PROCEDURE — 2580000003 HC RX 258: Performed by: INTERNAL MEDICINE

## 2021-02-08 PROCEDURE — 97530 THERAPEUTIC ACTIVITIES: CPT

## 2021-02-08 PROCEDURE — 6370000000 HC RX 637 (ALT 250 FOR IP): Performed by: NURSE PRACTITIONER

## 2021-02-08 PROCEDURE — 36415 COLL VENOUS BLD VENIPUNCTURE: CPT

## 2021-02-08 PROCEDURE — 85027 COMPLETE CBC AUTOMATED: CPT

## 2021-02-08 PROCEDURE — 94640 AIRWAY INHALATION TREATMENT: CPT

## 2021-02-08 PROCEDURE — 99232 SBSQ HOSP IP/OBS MODERATE 35: CPT | Performed by: INTERNAL MEDICINE

## 2021-02-08 PROCEDURE — 99222 1ST HOSP IP/OBS MODERATE 55: CPT | Performed by: SURGERY

## 2021-02-08 PROCEDURE — 6360000002 HC RX W HCPCS: Performed by: NURSE PRACTITIONER

## 2021-02-08 PROCEDURE — 97116 GAIT TRAINING THERAPY: CPT

## 2021-02-08 PROCEDURE — 6370000000 HC RX 637 (ALT 250 FOR IP): Performed by: INTERNAL MEDICINE

## 2021-02-08 PROCEDURE — 82947 ASSAY GLUCOSE BLOOD QUANT: CPT

## 2021-02-08 PROCEDURE — 99239 HOSP IP/OBS DSCHRG MGMT >30: CPT | Performed by: INTERNAL MEDICINE

## 2021-02-08 PROCEDURE — 94761 N-INVAS EAR/PLS OXIMETRY MLT: CPT

## 2021-02-08 PROCEDURE — 97163 PT EVAL HIGH COMPLEX 45 MIN: CPT

## 2021-02-08 PROCEDURE — 99223 1ST HOSP IP/OBS HIGH 75: CPT | Performed by: INTERNAL MEDICINE

## 2021-02-08 PROCEDURE — 6360000002 HC RX W HCPCS: Performed by: INTERNAL MEDICINE

## 2021-02-08 PROCEDURE — 80053 COMPREHEN METABOLIC PANEL: CPT

## 2021-02-08 PROCEDURE — 1200000000 HC SEMI PRIVATE

## 2021-02-08 RX ORDER — IPRATROPIUM BROMIDE AND ALBUTEROL SULFATE 2.5; .5 MG/3ML; MG/3ML
3 SOLUTION RESPIRATORY (INHALATION) EVERY 4 HOURS PRN
Status: DISCONTINUED | OUTPATIENT
Start: 2021-02-08 | End: 2021-02-12 | Stop reason: HOSPADM

## 2021-02-08 RX ORDER — OXYCODONE HYDROCHLORIDE AND ACETAMINOPHEN 5; 325 MG/1; MG/1
1 TABLET ORAL EVERY 4 HOURS PRN
Status: DISCONTINUED | OUTPATIENT
Start: 2021-02-08 | End: 2021-02-12 | Stop reason: HOSPADM

## 2021-02-08 RX ORDER — POLYETHYLENE GLYCOL 3350 17 G/17G
17 POWDER, FOR SOLUTION ORAL DAILY PRN
Status: DISCONTINUED | OUTPATIENT
Start: 2021-02-08 | End: 2021-02-12 | Stop reason: HOSPADM

## 2021-02-08 RX ORDER — SODIUM CHLORIDE 0.9 % (FLUSH) 0.9 %
10 SYRINGE (ML) INJECTION EVERY 12 HOURS SCHEDULED
Status: DISCONTINUED | OUTPATIENT
Start: 2021-02-08 | End: 2021-02-12 | Stop reason: HOSPADM

## 2021-02-08 RX ORDER — MORPHINE SULFATE 4 MG/ML
3 INJECTION, SOLUTION INTRAMUSCULAR; INTRAVENOUS EVERY 4 HOURS PRN
Status: DISCONTINUED | OUTPATIENT
Start: 2021-02-08 | End: 2021-02-10

## 2021-02-08 RX ORDER — FAMOTIDINE 20 MG/1
20 TABLET, FILM COATED ORAL 2 TIMES DAILY
Status: DISCONTINUED | OUTPATIENT
Start: 2021-02-08 | End: 2021-02-12 | Stop reason: HOSPADM

## 2021-02-08 RX ORDER — POTASSIUM CHLORIDE 20 MEQ/1
40 TABLET, EXTENDED RELEASE ORAL PRN
Status: DISCONTINUED | OUTPATIENT
Start: 2021-02-08 | End: 2021-02-12 | Stop reason: HOSPADM

## 2021-02-08 RX ORDER — NICOTINE 21 MG/24HR
1 PATCH, TRANSDERMAL 24 HOURS TRANSDERMAL DAILY PRN
Status: DISCONTINUED | OUTPATIENT
Start: 2021-02-08 | End: 2021-02-12 | Stop reason: HOSPADM

## 2021-02-08 RX ORDER — MAGNESIUM HYDROXIDE/ALUMINUM HYDROXICE/SIMETHICONE 120; 1200; 1200 MG/30ML; MG/30ML; MG/30ML
30 SUSPENSION ORAL EVERY 6 HOURS PRN
Status: DISCONTINUED | OUTPATIENT
Start: 2021-02-08 | End: 2021-02-12 | Stop reason: HOSPADM

## 2021-02-08 RX ORDER — INSULIN GLARGINE 100 [IU]/ML
25 INJECTION, SOLUTION SUBCUTANEOUS 2 TIMES DAILY
Status: DISCONTINUED | OUTPATIENT
Start: 2021-02-08 | End: 2021-02-12 | Stop reason: HOSPADM

## 2021-02-08 RX ORDER — POTASSIUM CHLORIDE 7.45 MG/ML
10 INJECTION INTRAVENOUS PRN
Status: DISCONTINUED | OUTPATIENT
Start: 2021-02-08 | End: 2021-02-12 | Stop reason: HOSPADM

## 2021-02-08 RX ORDER — ACETAMINOPHEN 325 MG/1
650 TABLET ORAL EVERY 6 HOURS PRN
Status: DISCONTINUED | OUTPATIENT
Start: 2021-02-08 | End: 2021-02-12 | Stop reason: HOSPADM

## 2021-02-08 RX ORDER — NICOTINE POLACRILEX 4 MG
15 LOZENGE BUCCAL PRN
Status: DISCONTINUED | OUTPATIENT
Start: 2021-02-08 | End: 2021-02-12 | Stop reason: HOSPADM

## 2021-02-08 RX ORDER — DEXTROSE MONOHYDRATE 50 MG/ML
100 INJECTION, SOLUTION INTRAVENOUS PRN
Status: DISCONTINUED | OUTPATIENT
Start: 2021-02-08 | End: 2021-02-12 | Stop reason: HOSPADM

## 2021-02-08 RX ORDER — HEPARIN SODIUM 5000 [USP'U]/ML
5000 INJECTION, SOLUTION INTRAVENOUS; SUBCUTANEOUS EVERY 8 HOURS SCHEDULED
Status: DISCONTINUED | OUTPATIENT
Start: 2021-02-08 | End: 2021-02-08 | Stop reason: SDUPTHER

## 2021-02-08 RX ORDER — ACETAMINOPHEN 650 MG/1
650 SUPPOSITORY RECTAL EVERY 6 HOURS PRN
Status: DISCONTINUED | OUTPATIENT
Start: 2021-02-08 | End: 2021-02-12 | Stop reason: HOSPADM

## 2021-02-08 RX ORDER — ALBUTEROL SULFATE 90 UG/1
2 AEROSOL, METERED RESPIRATORY (INHALATION) EVERY 6 HOURS PRN
Status: DISCONTINUED | OUTPATIENT
Start: 2021-02-08 | End: 2021-02-12 | Stop reason: HOSPADM

## 2021-02-08 RX ORDER — MAGNESIUM SULFATE 1 G/100ML
1000 INJECTION INTRAVENOUS PRN
Status: DISCONTINUED | OUTPATIENT
Start: 2021-02-08 | End: 2021-02-12 | Stop reason: HOSPADM

## 2021-02-08 RX ORDER — ONDANSETRON 2 MG/ML
4 INJECTION INTRAMUSCULAR; INTRAVENOUS EVERY 6 HOURS PRN
Status: DISCONTINUED | OUTPATIENT
Start: 2021-02-08 | End: 2021-02-12 | Stop reason: HOSPADM

## 2021-02-08 RX ORDER — INSULIN GLARGINE 100 [IU]/ML
25 INJECTION, SOLUTION SUBCUTANEOUS ONCE
Status: COMPLETED | OUTPATIENT
Start: 2021-02-08 | End: 2021-02-08

## 2021-02-08 RX ORDER — DEXTROSE MONOHYDRATE 25 G/50ML
12.5 INJECTION, SOLUTION INTRAVENOUS PRN
Status: DISCONTINUED | OUTPATIENT
Start: 2021-02-08 | End: 2021-02-12 | Stop reason: HOSPADM

## 2021-02-08 RX ORDER — PROMETHAZINE HYDROCHLORIDE 12.5 MG/1
12.5 TABLET ORAL EVERY 6 HOURS PRN
Status: DISCONTINUED | OUTPATIENT
Start: 2021-02-08 | End: 2021-02-12 | Stop reason: HOSPADM

## 2021-02-08 RX ORDER — BUDESONIDE AND FORMOTEROL FUMARATE DIHYDRATE 160; 4.5 UG/1; UG/1
2 AEROSOL RESPIRATORY (INHALATION) 2 TIMES DAILY
Status: DISCONTINUED | OUTPATIENT
Start: 2021-02-08 | End: 2021-02-12 | Stop reason: HOSPADM

## 2021-02-08 RX ORDER — HEPARIN SODIUM 5000 [USP'U]/ML
5000 INJECTION, SOLUTION INTRAVENOUS; SUBCUTANEOUS EVERY 8 HOURS SCHEDULED
Status: DISCONTINUED | OUTPATIENT
Start: 2021-02-08 | End: 2021-02-12 | Stop reason: HOSPADM

## 2021-02-08 RX ORDER — SODIUM CHLORIDE 0.9 % (FLUSH) 0.9 %
10 SYRINGE (ML) INJECTION PRN
Status: DISCONTINUED | OUTPATIENT
Start: 2021-02-08 | End: 2021-02-12 | Stop reason: HOSPADM

## 2021-02-08 RX ADMIN — ONDANSETRON 4 MG: 2 INJECTION INTRAMUSCULAR; INTRAVENOUS at 13:54

## 2021-02-08 RX ADMIN — SODIUM CHLORIDE, PRESERVATIVE FREE 10 ML: 5 INJECTION INTRAVENOUS at 11:40

## 2021-02-08 RX ADMIN — FAMOTIDINE 20 MG: 20 TABLET, FILM COATED ORAL at 09:26

## 2021-02-08 RX ADMIN — MORPHINE SULFATE 4 MG: 4 INJECTION, SOLUTION INTRAMUSCULAR; INTRAVENOUS at 06:33

## 2021-02-08 RX ADMIN — BUDESONIDE AND FORMOTEROL FUMARATE DIHYDRATE 2 PUFF: 160; 4.5 AEROSOL RESPIRATORY (INHALATION) at 09:17

## 2021-02-08 RX ADMIN — ONDANSETRON 4 MG: 2 INJECTION INTRAMUSCULAR; INTRAVENOUS at 01:03

## 2021-02-08 RX ADMIN — ONDANSETRON 4 MG: 2 INJECTION INTRAMUSCULAR; INTRAVENOUS at 18:45

## 2021-02-08 RX ADMIN — HEPARIN SODIUM 5000 UNITS: 5000 INJECTION INTRAVENOUS; SUBCUTANEOUS at 22:17

## 2021-02-08 RX ADMIN — CEFEPIME 2000 MG: 2 INJECTION, POWDER, FOR SOLUTION INTRAVENOUS at 06:33

## 2021-02-08 RX ADMIN — PROBIOTIC PRODUCT - TAB 1 TABLET: TAB at 21:05

## 2021-02-08 RX ADMIN — CEFEPIME HYDROCHLORIDE 2000 MG: 2 INJECTION, POWDER, FOR SOLUTION INTRAVENOUS at 22:17

## 2021-02-08 RX ADMIN — INSULIN GLARGINE 25 UNITS: 100 INJECTION, SOLUTION SUBCUTANEOUS at 18:49

## 2021-02-08 RX ADMIN — SODIUM CHLORIDE, PRESERVATIVE FREE 10 ML: 5 INJECTION INTRAVENOUS at 15:38

## 2021-02-08 RX ADMIN — MORPHINE SULFATE 4 MG: 4 INJECTION, SOLUTION INTRAMUSCULAR; INTRAVENOUS at 01:03

## 2021-02-08 RX ADMIN — SODIUM CHLORIDE, PRESERVATIVE FREE 10 ML: 5 INJECTION INTRAVENOUS at 13:55

## 2021-02-08 RX ADMIN — HEPARIN SODIUM 5000 UNITS: 5000 INJECTION INTRAVENOUS; SUBCUTANEOUS at 06:32

## 2021-02-08 RX ADMIN — MORPHINE SULFATE 3 MG: 4 INJECTION, SOLUTION INTRAMUSCULAR; INTRAVENOUS at 21:05

## 2021-02-08 RX ADMIN — OXYCODONE AND ACETAMINOPHEN 1 TABLET: 5; 325 TABLET ORAL at 19:09

## 2021-02-08 RX ADMIN — SODIUM CHLORIDE, PRESERVATIVE FREE 10 ML: 5 INJECTION INTRAVENOUS at 18:45

## 2021-02-08 RX ADMIN — VANCOMYCIN HYDROCHLORIDE 1000 MG: 1 INJECTION, POWDER, LYOPHILIZED, FOR SOLUTION INTRAVENOUS at 01:03

## 2021-02-08 RX ADMIN — PROBIOTIC PRODUCT - TAB 1 TABLET: TAB at 09:26

## 2021-02-08 RX ADMIN — INSULIN LISPRO 6 UNITS: 100 INJECTION, SOLUTION INTRAVENOUS; SUBCUTANEOUS at 17:36

## 2021-02-08 RX ADMIN — MORPHINE SULFATE 3 MG: 4 INJECTION, SOLUTION INTRAMUSCULAR; INTRAVENOUS at 11:38

## 2021-02-08 RX ADMIN — FAMOTIDINE 20 MG: 20 TABLET, FILM COATED ORAL at 21:05

## 2021-02-08 RX ADMIN — MORPHINE SULFATE 3 MG: 4 INJECTION, SOLUTION INTRAMUSCULAR; INTRAVENOUS at 15:38

## 2021-02-08 RX ADMIN — ALUMINUM HYDROXIDE, MAGNESIUM HYDROXIDE, AND SIMETHICONE 30 ML: 200; 200; 20 SUSPENSION ORAL at 23:07

## 2021-02-08 RX ADMIN — BUDESONIDE AND FORMOTEROL FUMARATE DIHYDRATE 2 PUFF: 160; 4.5 AEROSOL RESPIRATORY (INHALATION) at 19:05

## 2021-02-08 RX ADMIN — CEFEPIME HYDROCHLORIDE 2000 MG: 2 INJECTION, POWDER, FOR SOLUTION INTRAVENOUS at 13:19

## 2021-02-08 RX ADMIN — SODIUM CHLORIDE, PRESERVATIVE FREE 10 ML: 5 INJECTION INTRAVENOUS at 21:12

## 2021-02-08 RX ADMIN — OXYCODONE AND ACETAMINOPHEN 1 TABLET: 5; 325 TABLET ORAL at 23:09

## 2021-02-08 RX ADMIN — OXYCODONE AND ACETAMINOPHEN 1 TABLET: 5; 325 TABLET ORAL at 09:41

## 2021-02-08 RX ADMIN — HEPARIN SODIUM 5000 UNITS: 5000 INJECTION INTRAVENOUS; SUBCUTANEOUS at 13:54

## 2021-02-08 RX ADMIN — VANCOMYCIN HYDROCHLORIDE 1000 MG: 1 INJECTION, POWDER, LYOPHILIZED, FOR SOLUTION INTRAVENOUS at 11:37

## 2021-02-08 RX ADMIN — INSULIN LISPRO 1 UNITS: 100 INJECTION, SOLUTION INTRAVENOUS; SUBCUTANEOUS at 21:05

## 2021-02-08 RX ADMIN — INSULIN GLARGINE 25 UNITS: 100 INJECTION, SOLUTION SUBCUTANEOUS at 09:26

## 2021-02-08 ASSESSMENT — PAIN DESCRIPTION - DESCRIPTORS
DESCRIPTORS: CONSTANT;SHARP
DESCRIPTORS: SHARP
DESCRIPTORS: DISCOMFORT;SHARP

## 2021-02-08 ASSESSMENT — PAIN DESCRIPTION - PAIN TYPE
TYPE: CHRONIC PAIN
TYPE: ACUTE PAIN
TYPE: CHRONIC PAIN

## 2021-02-08 ASSESSMENT — PAIN SCALES - GENERAL
PAINLEVEL_OUTOF10: 8
PAINLEVEL_OUTOF10: 7
PAINLEVEL_OUTOF10: 6
PAINLEVEL_OUTOF10: 8
PAINLEVEL_OUTOF10: 5
PAINLEVEL_OUTOF10: 8
PAINLEVEL_OUTOF10: 6

## 2021-02-08 ASSESSMENT — PAIN DESCRIPTION - LOCATION
LOCATION: FOOT
LOCATION: FOOT

## 2021-02-08 ASSESSMENT — ENCOUNTER SYMPTOMS
GASTROINTESTINAL NEGATIVE: 1
RESPIRATORY NEGATIVE: 1
ALLERGIC/IMMUNOLOGIC NEGATIVE: 1

## 2021-02-08 ASSESSMENT — PAIN DESCRIPTION - ORIENTATION
ORIENTATION: LEFT

## 2021-02-08 ASSESSMENT — PAIN DESCRIPTION - FREQUENCY: FREQUENCY: CONTINUOUS

## 2021-02-08 ASSESSMENT — PAIN - FUNCTIONAL ASSESSMENT: PAIN_FUNCTIONAL_ASSESSMENT: ACTIVITIES ARE NOT PREVENTED
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
## 2021-02-08 NOTE — PROGRESS NOTES
Admitted to room 2025 per [facility] ambulance transport at 0800. Oriented to room and equipment, POC Blood sugar was 109. Admission database completed.  Breakfast given

## 2021-02-08 NOTE — PROGRESS NOTES
Pharmacy Vancomycin Consult     Vancomycin Day: 5  Current Dosin mg q12h    Temp max:  98.2    Recent Labs     21  0452 21  0500   BUN 14 15       Recent Labs     21  0452 21  0500   CREATININE 0.76 0.76       Recent Labs     21  0452 21  0500   WBC 7.8 9.4         Intake/Output Summary (Last 24 hours) at 2021  Last data filed at 2021  Gross per 24 hour   Intake 700 ml   Output 2200 ml   Net -1500 ml       Culture Date      Source                       Results  See micro reports    Ht Readings from Last 1 Encounters:   21 6' 1\" (1.854 m)        Wt Readings from Last 1 Encounters:   21 151 lb 7.3 oz (68.7 kg)         Body mass index is 19.98 kg/m². Estimated Creatinine Clearance: 97 mL/min (based on SCr of 0.76 mg/dL). Trough: 22.7 mcg/ml    Assessment/Plan:  Pt is showing drug accumulation. Will decrease dose to 1000 mg every 12 hours and recheck level after 3 doses.
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
## 2021-02-08 NOTE — CONSULTS
Pharmacy Note  Vancomycin Consult - Initial Note     Samara Ernst is a 63 y.o. male ordered Vancomycin. Current diagnosis for which MRSA is suspected/confirmed: Sepsis  Vancomycin order/consult received from Dr. Denisha Clark. Dosing verification and continuation from [facility]. Last note indicated that trough was elevated using 1250 mg q12h. Dosing was reduced to 1000 mg every 12 hours. We will continue this dosing and draw trough tomorrow 2/9/21 at 11:00. Necessary adjustments will be made at that time.     Additional antimicrobials:  Sepsis    Patient Active Problem List   Diagnosis    Type 2 diabetes mellitus with diabetic polyneuropathy, with long-term current use of insulin (HCC)    Neuropathic pain, leg    Diabetic polyneuropathy ([facility]    Allergic rhinitis    Tobacco dependence    Edentulous    Dysphagia    Lung nodules    Esophageal cancer ([facility])    Fracture of humerus, left, closed    Closed fracture of humerus    DM type 2 with diabetic peripheral neuropathy (HCC)    Chronic obstructive pulmonary disease (HCC)    HLD (hyperlipidemia)    Gastroesophageal reflux disease    Chronic pain syndrome    Marijuana use    History of colon polyps    Functional diarrhea    Sepsis due to methicillin resistant Staphylococcus aureus (MRSA) ([facility])    History of esophageal cancer    Osteomyelitis, chronic, ankle or foot ([address]    PAD (peripheral artery disease) ([address]    Other pulmonary embolism without acute cor pulmonale (HCC)    Carotid stenosis, asymptomatic, bilateral    Lower limb amputation status    Fx humeral neck, right, closed, initial encounter    Transient hypotension    Constipation due to opioid therapy    Acute kidney injury ([address]    Diabetic ulcer of left midfoot associated with type 2 diabetes mellitus, with fat layer exposed ([address]    Complication of below knee amputation stump (HCC)    COPD exacerbation (HCC)    Hyponatremia    Pain, phantom limb ([address]    Below-knee amputation of right lower extremity (HCC)    Moderate protein malnutrition ([address]    Essential hypertension    Uncontrolled type 2 diabetes mellitus with hyperglycemia ([address]    History of pulmonary embolism - 2017    Atelectasis    Uncontrolled type 2 diabetes mellitus with foot ulcer (HCC)    Azotemia    Anemia, normocytic normochromic    Drug-induced constipation    Osteomyelitis of metatarsals of left foot (HCC)    Diabetic foot infection ([address]    Noncompliance    Type 1 diabetes mellitus with diabetic foot infection ([address]    Acute osteomyelitis of left foot (HCC)    Cellulitis of left foot    Mild malnutrition ([address]    Diabetic infection of left foot (HCC)    Hypocalcemia    Hypokalemia    Moderate malnutrition ([address]    Diabetic ulcer of left midfoot associated with type 2 diabetes mellitus, with necrosis of bone ([address]    History of below knee amputation, right ([address]    Foot ulcer with fat layer exposed, left ([address]    Chronic refractory osteomyelitis of left foot ([address]    Sepsis ([address]    Pyogenic inflammation of bone (HCC)       Height:   185.4 cm  Wt Readings from Last 1 Encounters:   02/08/21 151 lb (68.5 kg)     Allergies:  Gabapentin       No results found for: PROCAL  Recent Labs     02/07/21  0500 02/08/21  0428   BUN 15 14     Recent Labs     02/07/21  0500 02/08/21  0428   CREATININE 0.76 0.82     Recent Labs     02/07/21  0500 02/08/21  0428   WBC 9.4 9.6     No intake or output data in the 24 hours ending 02/08/21 1014    Temp: 98.1 F    Culture Date / Erin Angers  /  Results  2/4/21               Blood       MRSA   Actual Weight:    Wt Readings from Last 1 Encounters:   02/08/21 151 lb (68.5 kg)     CrCl based on IBW\" 89.3 mL/min        PLAN   Initial loading dose of 25mg/kg (max of 2500 mg) = -0- mg   2. Vancomycin 1000 mg IV every 12 hours. 3.   Ensured BUN/sCr ordered at baseline and at least every 3rd day. 4.   ONLY for suspected pneumonia or COPD: MRSA nasal swab  is not ordered. Non respiratory infection. .    5. Trough ordered for: 2/9/21 11:00. Trough Goals (Non-dialysis patient) Peaks are not routinely recommended. 10-20 mcg/mL for mild skin/soft tissue infections or UTI.  15-20 mcg/mL is the target trough for all other indications that MRSA infection is suspected. TROUGH TIMING: (Additional levels drawn based on renal function and/or clinical response). Dosing interval Timing of Trough   Every 8 hr, 12 hr, or 18 hr regimen Prior to the 4th dose  Twice weekly troughs for every 8 hour dosing   Every 24 hr regimen Prior to the 3rd or 4th dose   Every 36 hr regimen Prior to the 3rd dose           Thank you for the consult. Pharmacy will continue to follow.   Ying Sparks RPh  2/8/2021  10:14 AM
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
## 2021-02-08 NOTE — CARE COORDINATION
Social work; spoke to pt who is agreeable to rehab and /or [facility] which appears to be the plan started at [facility]. Will fax referral and updates to [facility]. If pt does need acute rehab after Surgery will call [facility] facilities in the area to see who is in network with this insurance. Will need philip and Rx at discharge.   Jaz camacho

## 2021-02-09 ENCOUNTER — ANESTHESIA (OUTPATIENT)
Dept: OPERATING ROOM | Age: 66
DRG: 854 | End: 2021-02-09
Payer: MEDICARE

## 2021-02-09 ENCOUNTER — ANESTHESIA EVENT (OUTPATIENT)
Dept: OPERATING ROOM | Age: 66
DRG: 854 | End: 2021-02-09
Payer: MEDICARE

## 2021-02-09 VITALS — DIASTOLIC BLOOD PRESSURE: 40 MMHG | TEMPERATURE: 99.1 F | SYSTOLIC BLOOD PRESSURE: 89 MMHG | OXYGEN SATURATION: 100 %

## 2021-02-09 LAB
ABO/RH: NORMAL
ANION GAP SERPL CALCULATED.3IONS-SCNC: 10 MMOL/L (ref 9–17)
ANTIBODY SCREEN: NEGATIVE
ARM BAND NUMBER: NORMAL
BUN BLDV-MCNC: 20 MG/DL (ref 8–23)
BUN/CREAT BLD: 20 (ref 9–20)
CALCIUM SERPL-MCNC: 9.1 MG/DL (ref 8.6–10.4)
CHLORIDE BLD-SCNC: 106 MMOL/L (ref 98–107)
CO2: 25 MMOL/L (ref 20–31)
CREAT SERPL-MCNC: 0.99 MG/DL (ref 0.7–1.2)
CULTURE: ABNORMAL
DIRECT EXAM: ABNORMAL
EXPIRATION DATE: NORMAL
FERRITIN: 56 UG/L (ref 30–400)
GFR AFRICAN AMERICAN: >60 ML/MIN
GFR NON-AFRICAN AMERICAN: >60 ML/MIN
GFR SERPL CREATININE-BSD FRML MDRD: ABNORMAL ML/MIN/{1.73_M2}
GFR SERPL CREATININE-BSD FRML MDRD: ABNORMAL ML/MIN/{1.73_M2}
GLUCOSE BLD-MCNC: 101 MG/DL (ref 75–110)
GLUCOSE BLD-MCNC: 109 MG/DL (ref 75–110)
GLUCOSE BLD-MCNC: 120 MG/DL (ref 75–110)
GLUCOSE BLD-MCNC: 150 MG/DL (ref 75–110)
GLUCOSE BLD-MCNC: 163 MG/DL (ref 75–110)
GLUCOSE BLD-MCNC: 172 MG/DL (ref 75–110)
GLUCOSE BLD-MCNC: 187 MG/DL (ref 70–99)
GLUCOSE BLD-MCNC: 90 MG/DL (ref 75–110)
GLUCOSE BLD-MCNC: 91 MG/DL (ref 75–110)
HCT VFR BLD CALC: 30.5 % (ref 40.7–50.3)
HEMOGLOBIN: 8.9 G/DL (ref 13–17)
INR BLD: 1
Lab: ABNORMAL
MCH RBC QN AUTO: 25.4 PG (ref 25.2–33.5)
MCHC RBC AUTO-ENTMCNC: 29.2 G/DL (ref 28.4–34.8)
MCV RBC AUTO: 87.1 FL (ref 82.6–102.9)
NRBC AUTOMATED: 0 PER 100 WBC
PARTIAL THROMBOPLASTIN TIME: 34.5 SEC (ref 23.9–33.8)
PDW BLD-RTO: 15.2 % (ref 11.8–14.4)
PLATELET # BLD: 549 K/UL (ref 138–453)
PMV BLD AUTO: 9 FL (ref 8.1–13.5)
POTASSIUM SERPL-SCNC: 4.1 MMOL/L (ref 3.7–5.3)
PROTHROMBIN TIME: 12.9 SEC (ref 11.5–14.2)
RBC # BLD: 3.5 M/UL (ref 4.21–5.77)
SODIUM BLD-SCNC: 141 MMOL/L (ref 135–144)
SPECIMEN DESCRIPTION: ABNORMAL
VANCOMYCIN TROUGH DATE LAST DOSE: NORMAL
VANCOMYCIN TROUGH DOSE AMOUNT: NORMAL
VANCOMYCIN TROUGH TIME LAST DOSE: NORMAL
VANCOMYCIN TROUGH: 19.5 UG/ML (ref 10–20)
WBC # BLD: 10.2 K/UL (ref 3.5–11.3)

## 2021-02-09 PROCEDURE — 2580000003 HC RX 258: Performed by: SURGERY

## 2021-02-09 PROCEDURE — 94640 AIRWAY INHALATION TREATMENT: CPT

## 2021-02-09 PROCEDURE — 6370000000 HC RX 637 (ALT 250 FOR IP): Performed by: SURGERY

## 2021-02-09 PROCEDURE — 3700000001 HC ADD 15 MINUTES (ANESTHESIA): Performed by: SURGERY

## 2021-02-09 PROCEDURE — 6360000002 HC RX W HCPCS: Performed by: SURGERY

## 2021-02-09 PROCEDURE — 85610 PROTHROMBIN TIME: CPT

## 2021-02-09 PROCEDURE — 80048 BASIC METABOLIC PNL TOTAL CA: CPT

## 2021-02-09 PROCEDURE — 86850 RBC ANTIBODY SCREEN: CPT

## 2021-02-09 PROCEDURE — 83550 IRON BINDING TEST: CPT

## 2021-02-09 PROCEDURE — 6360000002 HC RX W HCPCS: Performed by: NURSE ANESTHETIST, CERTIFIED REGISTERED

## 2021-02-09 PROCEDURE — 3600000012 HC SURGERY LEVEL 2 ADDTL 15MIN: Performed by: SURGERY

## 2021-02-09 PROCEDURE — 99232 SBSQ HOSP IP/OBS MODERATE 35: CPT | Performed by: NURSE PRACTITIONER

## 2021-02-09 PROCEDURE — 64445 NJX AA&/STRD SCIATIC NRV IMG: CPT | Performed by: ANESTHESIOLOGY

## 2021-02-09 PROCEDURE — 88305 TISSUE EXAM BY PATHOLOGIST: CPT

## 2021-02-09 PROCEDURE — 88311 DECALCIFY TISSUE: CPT

## 2021-02-09 PROCEDURE — 2580000003 HC RX 258: Performed by: INTERNAL MEDICINE

## 2021-02-09 PROCEDURE — 99232 SBSQ HOSP IP/OBS MODERATE 35: CPT | Performed by: INTERNAL MEDICINE

## 2021-02-09 PROCEDURE — 6360000002 HC RX W HCPCS: Performed by: ANESTHESIOLOGY

## 2021-02-09 PROCEDURE — 2709999900 HC NON-CHARGEABLE SUPPLY: Performed by: SURGERY

## 2021-02-09 PROCEDURE — 85730 THROMBOPLASTIN TIME PARTIAL: CPT

## 2021-02-09 PROCEDURE — 2720000010 HC SURG SUPPLY STERILE: Performed by: SURGERY

## 2021-02-09 PROCEDURE — 7100000000 HC PACU RECOVERY - FIRST 15 MIN: Performed by: SURGERY

## 2021-02-09 PROCEDURE — 6360000002 HC RX W HCPCS: Performed by: INTERNAL MEDICINE

## 2021-02-09 PROCEDURE — 3600000002 HC SURGERY LEVEL 2 BASE: Performed by: SURGERY

## 2021-02-09 PROCEDURE — 2500000003 HC RX 250 WO HCPCS: Performed by: NURSE ANESTHETIST, CERTIFIED REGISTERED

## 2021-02-09 PROCEDURE — 85027 COMPLETE CBC AUTOMATED: CPT

## 2021-02-09 PROCEDURE — 2580000003 HC RX 258: Performed by: NURSE ANESTHETIST, CERTIFIED REGISTERED

## 2021-02-09 PROCEDURE — 1200000000 HC SEMI PRIVATE

## 2021-02-09 PROCEDURE — 82728 ASSAY OF FERRITIN: CPT

## 2021-02-09 PROCEDURE — 36415 COLL VENOUS BLD VENIPUNCTURE: CPT

## 2021-02-09 PROCEDURE — 0Y6J0Z1 DETACHMENT AT LEFT LOWER LEG, HIGH, OPEN APPROACH: ICD-10-PCS | Performed by: SURGERY

## 2021-02-09 PROCEDURE — 80202 ASSAY OF VANCOMYCIN: CPT

## 2021-02-09 PROCEDURE — 6370000000 HC RX 637 (ALT 250 FOR IP): Performed by: INTERNAL MEDICINE

## 2021-02-09 PROCEDURE — 86901 BLOOD TYPING SEROLOGIC RH(D): CPT

## 2021-02-09 PROCEDURE — 27880 AMPUTATION OF LOWER LEG: CPT | Performed by: SURGERY

## 2021-02-09 PROCEDURE — 83540 ASSAY OF IRON: CPT

## 2021-02-09 PROCEDURE — 3700000000 HC ANESTHESIA ATTENDED CARE: Performed by: SURGERY

## 2021-02-09 PROCEDURE — 86900 BLOOD TYPING SEROLOGIC ABO: CPT

## 2021-02-09 PROCEDURE — 7100000001 HC PACU RECOVERY - ADDTL 15 MIN: Performed by: SURGERY

## 2021-02-09 RX ORDER — KETAMINE HCL IN NACL, ISO-OSM 100MG/10ML
SYRINGE (ML) INJECTION PRN
Status: DISCONTINUED | OUTPATIENT
Start: 2021-02-09 | End: 2021-02-09

## 2021-02-09 RX ORDER — FENTANYL CITRATE 50 UG/ML
INJECTION, SOLUTION INTRAMUSCULAR; INTRAVENOUS PRN
Status: DISCONTINUED | OUTPATIENT
Start: 2021-02-09 | End: 2021-02-09 | Stop reason: SDUPTHER

## 2021-02-09 RX ORDER — ONDANSETRON 2 MG/ML
4 INJECTION INTRAMUSCULAR; INTRAVENOUS
Status: DISCONTINUED | OUTPATIENT
Start: 2021-02-09 | End: 2021-02-09 | Stop reason: HOSPADM

## 2021-02-09 RX ORDER — PROPOFOL 10 MG/ML
INJECTION, EMULSION INTRAVENOUS PRN
Status: DISCONTINUED | OUTPATIENT
Start: 2021-02-09 | End: 2021-02-09 | Stop reason: SDUPTHER

## 2021-02-09 RX ORDER — FENTANYL CITRATE 50 UG/ML
INJECTION, SOLUTION INTRAMUSCULAR; INTRAVENOUS PRN
Status: DISCONTINUED | OUTPATIENT
Start: 2021-02-09 | End: 2021-02-09

## 2021-02-09 RX ORDER — HYDROCODONE BITARTRATE AND ACETAMINOPHEN 5; 325 MG/1; MG/1
1 TABLET ORAL PRN
Status: DISCONTINUED | OUTPATIENT
Start: 2021-02-09 | End: 2021-02-09 | Stop reason: HOSPADM

## 2021-02-09 RX ORDER — HYDROCODONE BITARTRATE AND ACETAMINOPHEN 5; 325 MG/1; MG/1
2 TABLET ORAL PRN
Status: DISCONTINUED | OUTPATIENT
Start: 2021-02-09 | End: 2021-02-09 | Stop reason: HOSPADM

## 2021-02-09 RX ORDER — LIDOCAINE HYDROCHLORIDE 20 MG/ML
INJECTION, SOLUTION EPIDURAL; INFILTRATION; INTRACAUDAL; PERINEURAL PRN
Status: DISCONTINUED | OUTPATIENT
Start: 2021-02-09 | End: 2021-02-09 | Stop reason: SDUPTHER

## 2021-02-09 RX ORDER — ROCURONIUM BROMIDE 10 MG/ML
INJECTION, SOLUTION INTRAVENOUS PRN
Status: DISCONTINUED | OUTPATIENT
Start: 2021-02-09 | End: 2021-02-09 | Stop reason: SDUPTHER

## 2021-02-09 RX ORDER — ROPIVACAINE HYDROCHLORIDE 5 MG/ML
INJECTION, SOLUTION EPIDURAL; INFILTRATION; PERINEURAL
Status: COMPLETED | OUTPATIENT
Start: 2021-02-09 | End: 2021-02-09

## 2021-02-09 RX ORDER — MIDAZOLAM HYDROCHLORIDE 1 MG/ML
INJECTION INTRAMUSCULAR; INTRAVENOUS PRN
Status: DISCONTINUED | OUTPATIENT
Start: 2021-02-09 | End: 2021-02-09 | Stop reason: SDUPTHER

## 2021-02-09 RX ORDER — ONDANSETRON 2 MG/ML
INJECTION INTRAMUSCULAR; INTRAVENOUS PRN
Status: DISCONTINUED | OUTPATIENT
Start: 2021-02-09 | End: 2021-02-09 | Stop reason: SDUPTHER

## 2021-02-09 RX ORDER — SUCCINYLCHOLINE/SOD CL,ISO/PF 100 MG/5ML
SYRINGE (ML) INTRAVENOUS PRN
Status: DISCONTINUED | OUTPATIENT
Start: 2021-02-09 | End: 2021-02-09 | Stop reason: SDUPTHER

## 2021-02-09 RX ORDER — KETAMINE HCL IN NACL, ISO-OSM 100MG/10ML
SYRINGE (ML) INJECTION PRN
Status: DISCONTINUED | OUTPATIENT
Start: 2021-02-09 | End: 2021-02-09 | Stop reason: SDUPTHER

## 2021-02-09 RX ORDER — PHENYLEPHRINE HCL IN 0.9% NACL 1 MG/10 ML
SYRINGE (ML) INTRAVENOUS PRN
Status: DISCONTINUED | OUTPATIENT
Start: 2021-02-09 | End: 2021-02-09 | Stop reason: SDUPTHER

## 2021-02-09 RX ORDER — FENTANYL CITRATE 50 UG/ML
25 INJECTION, SOLUTION INTRAMUSCULAR; INTRAVENOUS EVERY 5 MIN PRN
Status: DISCONTINUED | OUTPATIENT
Start: 2021-02-09 | End: 2021-02-09 | Stop reason: HOSPADM

## 2021-02-09 RX ORDER — SODIUM CHLORIDE 9 MG/ML
INJECTION, SOLUTION INTRAVENOUS CONTINUOUS PRN
Status: DISCONTINUED | OUTPATIENT
Start: 2021-02-09 | End: 2021-02-09 | Stop reason: SDUPTHER

## 2021-02-09 RX ORDER — FENTANYL CITRATE 50 UG/ML
50 INJECTION, SOLUTION INTRAMUSCULAR; INTRAVENOUS EVERY 5 MIN PRN
Status: DISCONTINUED | OUTPATIENT
Start: 2021-02-09 | End: 2021-02-09 | Stop reason: HOSPADM

## 2021-02-09 RX ADMIN — Medication 200 MCG: at 14:43

## 2021-02-09 RX ADMIN — ONDANSETRON 4 MG: 2 INJECTION INTRAMUSCULAR; INTRAVENOUS at 17:10

## 2021-02-09 RX ADMIN — CEFEPIME HYDROCHLORIDE 2000 MG: 2 INJECTION, POWDER, FOR SOLUTION INTRAVENOUS at 21:24

## 2021-02-09 RX ADMIN — ROPIVACAINE HYDROCHLORIDE 40 ML: 5 INJECTION, SOLUTION EPIDURAL; INFILTRATION; PERINEURAL at 15:21

## 2021-02-09 RX ADMIN — Medication 10 MG: at 15:01

## 2021-02-09 RX ADMIN — Medication 200 MCG: at 13:58

## 2021-02-09 RX ADMIN — BUDESONIDE AND FORMOTEROL FUMARATE DIHYDRATE 2 PUFF: 160; 4.5 AEROSOL RESPIRATORY (INHALATION) at 09:38

## 2021-02-09 RX ADMIN — Medication 100 MG: at 12:56

## 2021-02-09 RX ADMIN — Medication 200 MCG: at 14:24

## 2021-02-09 RX ADMIN — VANCOMYCIN HYDROCHLORIDE 1000 G: 1 INJECTION, POWDER, LYOPHILIZED, FOR SOLUTION INTRAVENOUS at 12:46

## 2021-02-09 RX ADMIN — Medication 200 MCG: at 14:39

## 2021-02-09 RX ADMIN — ROCURONIUM BROMIDE 25 MG: 10 INJECTION, SOLUTION INTRAVENOUS at 13:04

## 2021-02-09 RX ADMIN — SODIUM CHLORIDE: 9 INJECTION, SOLUTION INTRAVENOUS at 12:44

## 2021-02-09 RX ADMIN — Medication 10 MG: at 14:38

## 2021-02-09 RX ADMIN — HEPARIN SODIUM 5000 UNITS: 5000 INJECTION INTRAVENOUS; SUBCUTANEOUS at 21:25

## 2021-02-09 RX ADMIN — MORPHINE SULFATE 3 MG: 4 INJECTION, SOLUTION INTRAMUSCULAR; INTRAVENOUS at 04:56

## 2021-02-09 RX ADMIN — Medication 200 MCG: at 14:32

## 2021-02-09 RX ADMIN — ONDANSETRON 4 MG: 2 INJECTION INTRAMUSCULAR; INTRAVENOUS at 05:28

## 2021-02-09 RX ADMIN — BUDESONIDE AND FORMOTEROL FUMARATE DIHYDRATE 2 PUFF: 160; 4.5 AEROSOL RESPIRATORY (INHALATION) at 19:34

## 2021-02-09 RX ADMIN — LIDOCAINE HYDROCHLORIDE 50 MG: 20 INJECTION, SOLUTION EPIDURAL; INFILTRATION; INTRACAUDAL; PERINEURAL at 15:04

## 2021-02-09 RX ADMIN — Medication 200 MCG: at 13:18

## 2021-02-09 RX ADMIN — Medication 200 MCG: at 13:08

## 2021-02-09 RX ADMIN — Medication 50 MCG: at 12:53

## 2021-02-09 RX ADMIN — Medication 200 MCG: at 13:29

## 2021-02-09 RX ADMIN — VANCOMYCIN HYDROCHLORIDE 1000 MG: 1 INJECTION, POWDER, LYOPHILIZED, FOR SOLUTION INTRAVENOUS at 23:33

## 2021-02-09 RX ADMIN — ONDANSETRON 4 MG: 2 INJECTION, SOLUTION INTRAMUSCULAR; INTRAVENOUS at 13:52

## 2021-02-09 RX ADMIN — MORPHINE SULFATE 3 MG: 4 INJECTION, SOLUTION INTRAMUSCULAR; INTRAVENOUS at 21:25

## 2021-02-09 RX ADMIN — SODIUM CHLORIDE, PRESERVATIVE FREE 10 ML: 5 INJECTION INTRAVENOUS at 08:59

## 2021-02-09 RX ADMIN — SODIUM CHLORIDE, PRESERVATIVE FREE 10 ML: 5 INJECTION INTRAVENOUS at 21:33

## 2021-02-09 RX ADMIN — VANCOMYCIN HYDROCHLORIDE 1000 MG: 1 INJECTION, POWDER, LYOPHILIZED, FOR SOLUTION INTRAVENOUS at 12:01

## 2021-02-09 RX ADMIN — MORPHINE SULFATE 3 MG: 4 INJECTION, SOLUTION INTRAMUSCULAR; INTRAVENOUS at 08:58

## 2021-02-09 RX ADMIN — FAMOTIDINE 20 MG: 20 TABLET, FILM COATED ORAL at 21:24

## 2021-02-09 RX ADMIN — Medication 200 MCG: at 13:44

## 2021-02-09 RX ADMIN — FAMOTIDINE 20 MG: 20 TABLET, FILM COATED ORAL at 08:59

## 2021-02-09 RX ADMIN — VANCOMYCIN HYDROCHLORIDE 1000 MG: 1 INJECTION, POWDER, LYOPHILIZED, FOR SOLUTION INTRAVENOUS at 00:15

## 2021-02-09 RX ADMIN — CEFEPIME HYDROCHLORIDE 2000 MG: 2 INJECTION, POWDER, FOR SOLUTION INTRAVENOUS at 06:43

## 2021-02-09 RX ADMIN — Medication 200 MCG: at 14:10

## 2021-02-09 RX ADMIN — PROPOFOL 100 MG: 10 INJECTION, EMULSION INTRAVENOUS at 12:56

## 2021-02-09 RX ADMIN — Medication 50 MCG: at 13:55

## 2021-02-09 RX ADMIN — INSULIN LISPRO 1 UNITS: 100 INJECTION, SOLUTION INTRAVENOUS; SUBCUTANEOUS at 21:25

## 2021-02-09 RX ADMIN — Medication 200 MCG: at 13:37

## 2021-02-09 RX ADMIN — Medication 200 MCG: at 14:17

## 2021-02-09 RX ADMIN — LIDOCAINE HYDROCHLORIDE 50 MG: 20 INJECTION, SOLUTION EPIDURAL; INFILTRATION; INTRACAUDAL; PERINEURAL at 12:54

## 2021-02-09 RX ADMIN — Medication 25 MG: at 13:20

## 2021-02-09 RX ADMIN — Medication 200 MCG: at 14:05

## 2021-02-09 RX ADMIN — MIDAZOLAM 2 MG: 1 INJECTION INTRAMUSCULAR; INTRAVENOUS at 12:43

## 2021-02-09 RX ADMIN — PROBIOTIC PRODUCT - TAB 1 TABLET: TAB at 21:24

## 2021-02-09 ASSESSMENT — PULMONARY FUNCTION TESTS
PIF_VALUE: 1
PIF_VALUE: 20
PIF_VALUE: 20
PIF_VALUE: 2
PIF_VALUE: 21
PIF_VALUE: 20
PIF_VALUE: 1
PIF_VALUE: 3
PIF_VALUE: 2
PIF_VALUE: 21
PIF_VALUE: 20
PIF_VALUE: 20
PIF_VALUE: 1
PIF_VALUE: 20
PIF_VALUE: 21
PIF_VALUE: 3
PIF_VALUE: 20
PIF_VALUE: 21
PIF_VALUE: 20
PIF_VALUE: 2
PIF_VALUE: 20
PIF_VALUE: 2
PIF_VALUE: 20
PIF_VALUE: 20
PIF_VALUE: 1
PIF_VALUE: 10
PIF_VALUE: 10
PIF_VALUE: 20
PIF_VALUE: 21
PIF_VALUE: 2
PIF_VALUE: 20
PIF_VALUE: 21
PIF_VALUE: 2
PIF_VALUE: 20
PIF_VALUE: 20
PIF_VALUE: 1
PIF_VALUE: 20
PIF_VALUE: 3
PIF_VALUE: 20
PIF_VALUE: 20
PIF_VALUE: 2
PIF_VALUE: 14
PIF_VALUE: 2
PIF_VALUE: 20
PIF_VALUE: 21
PIF_VALUE: 1
PIF_VALUE: 21
PIF_VALUE: 21
PIF_VALUE: 20
PIF_VALUE: 1
PIF_VALUE: 21
PIF_VALUE: 21
PIF_VALUE: 20
PIF_VALUE: 21
PIF_VALUE: 20
PIF_VALUE: 21
PIF_VALUE: 20
PIF_VALUE: 1
PIF_VALUE: 21
PIF_VALUE: 20
PIF_VALUE: 21
PIF_VALUE: 20

## 2021-02-09 ASSESSMENT — PAIN DESCRIPTION - FREQUENCY
FREQUENCY: CONTINUOUS
FREQUENCY: CONTINUOUS

## 2021-02-09 ASSESSMENT — PAIN DESCRIPTION - PAIN TYPE: TYPE: SURGICAL PAIN

## 2021-02-09 ASSESSMENT — ENCOUNTER SYMPTOMS
RESPIRATORY NEGATIVE: 1
GASTROINTESTINAL NEGATIVE: 1
ALLERGIC/IMMUNOLOGIC NEGATIVE: 1

## 2021-02-09 ASSESSMENT — PAIN DESCRIPTION - ORIENTATION
ORIENTATION: LEFT
ORIENTATION: LEFT

## 2021-02-09 ASSESSMENT — PAIN DESCRIPTION - DESCRIPTORS: DESCRIPTORS: ACHING;DISCOMFORT

## 2021-02-09 ASSESSMENT — PAIN SCALES - GENERAL
PAINLEVEL_OUTOF10: 7
PAINLEVEL_OUTOF10: 7

## 2021-02-09 ASSESSMENT — PAIN DESCRIPTION - ONSET: ONSET: ON-GOING

## 2021-02-09 NOTE — ANESTHESIA PROCEDURE NOTES
Peripheral Block    Patient location during procedure: pre-op  Start time: 2/9/2021 3:10 PM  End time: 2/9/2021 3:17 PM  Staffing  Performed: anesthesiologist   Anesthesiologist: Melanie Dia MD  Preanesthetic Checklist  Completed: patient identified, IV checked, site marked, risks and benefits discussed, surgical consent, monitors and equipment checked, pre-op evaluation, timeout performed, anesthesia consent given, oxygen available and patient being monitored  Peripheral Block  Prep: ChloraPrep  Patient monitoring: cardiac monitor, continuous pulse ox, frequent blood pressure checks and IV access  Block type: Sciatic  Laterality: left  Injection technique: single-shot  Guidance: ultrasound guided  Local infiltration: lidocaine  Infiltration strength: 1 %  Dose: 3 mL  Popliteal  Provider prep: mask and sterile gloves  Local infiltration: lidocaine  Needle  Needle gauge: 21 G  Needle length: 10 cm  Needle localization: ultrasound guidance  Assessment  Injection assessment: negative aspiration for heme, no paresthesia on injection and local visualized surrounding nerve on ultrasound  Paresthesia pain: none  Slow fractionated injection: yes  Hemodynamics: stable  Additional Notes  U/S 35288.  (1) Under ultrasound guidance, a  gauge needle was inserted and placed in close proximity to the  nerve.  (2) Ultrasound was also used to visualize the spread of the anesthetic in close proximity to the nerve being blocked. (3) The nerve appeared anatomically normal, and (4 there were no apparent abnormal pathological findings on the image that were readily visible and related to the nerve being blocked. (5) A permanent ultrasound image was saved in the patient's record.     20 ml each at femoral nerve and sciatic at popliteal fossa        Medications Administered  Ropivacaine (NAROPIN) injection 0.5%, 40 mL  Reason for block: post-op pain management and at surgeon's request

## 2021-02-09 NOTE — PROGRESS NOTES
[facility]  Progress Note    Trenton Holguin  MRN: [id number]  Date: 2/9/2021  LOS: 1     Reason for F/U :   MRSA sepsis, osteomyelitis left foot. Impression :   1. Left foot infection/nonhealing surgical wound with cuboid and fourth metatarsal bone exposure/osteomyelitis  2. MRSA bacteremia likely secondary to above infection versus PICC line infection  3. Peripheral arterial disease  4. Diabetes mellitus with associated neuropathy    Recommendations:   · Continue intravenous antimicrobial therapy with cefepime and vancomycin  · The cefepime can likely be stopped soon but the patient will need to complete a 4-week course of vancomycin given the bacteremia  · We will continue to follow the operative findings. Infection Control Recommendations:   Contact precautions    Discharge Planning:   Estimated Length of IV antimicrobials: 4 weeks  Patient will need Midline Catheter Insertion/ PICC line Insertion: No  Patient will need: Home IV , [facility],  SNF,  LTAC: Undetermined  Patient willneed outpatient wound care: No    Medical Decision making / Summary of Stay:   Trenton Holguin is a 63y.o.-year-old male who was initially admitted on 2/8/2021. Beata Cardenas is a 63y.o.-year-old male presented to hospital with worsening, nonhealing left foot wound at the left transmetatarsal amputation stump with bone exposure, underlying osteomyelitis of the first and fifth metatarsal status post recent incision and drainage for multiple abscesses tracking along the tendon and to the plantar aspect of the calcaneus on 1/5/2021 group B streptococcus and E. coli growth on culture. The patient had PICC line placed and was discharged on IV ceftriaxone that he was receiving at [facility] but did not receive upon discharge home according to the patient. He also had peripheral arterial disease and diabetes mellitus.   BKA was recommended but patient declined and wanted to try IV antibiotics and BMP:   Recent Labs     02/08/21  0428 02/09/21  0619    141   K 4.3 4.1    106   CO2 25 25   BUN 14 20   CREATININE 0.82 0.99     Hepatic Function Panel:   Recent Labs     02/07/21  0500 02/08/21  0428   PROT 6.5 6.1*   LABALBU 2.7* 2.5*   BILITOT <0.15* <0.15*   ALKPHOS 85 83   ALT 5 5   AST 6 5         No results found for: PROCAL  Lab Results   Component Value Date    .0 02/03/2021    CRP 46.6 01/07/2021    .8 01/02/2021     Lab Results   Component Value Date    SEDRATE 36 (H) 02/03/2021         Lab Results   Component Value Date    DDIMER 0.39 06/29/2020    DDIMER 1.63 12/20/2017    DDIMER 0.68 12/25/2011     Lab Results   Component Value Date    FERRITIN 56 02/09/2021    FERRITIN 64 01/25/2014     No results found for: LDH  No results found for: FIBRINOGEN    Results in Past 30 Days  Result Component Current Result Ref Range Previous Result Ref Range   SARS-CoV-2      (2/4/2021)  Not in Time Range          (2/4/2021)       Not Detected (2/4/2021) Not Detected       Lab Results   Component Value Date    COVID19 Not Detected 02/04/2021    COVID19 Not Detected 12/31/2020    COVID19 Not Detected 11/10/2020    COVID19 Not Detected 08/23/2020       Recent Labs     02/07/21  2041 02/09/21  1117   [facility] 22.7* 19.5       Imaging Studies:   No new imaging    Cultures:     FOOT LEFT    Special Requests 02/04/2021 10:14  [address] [facility]   PENDING    Direct Exam Abnormal  02/04/2021 10:14  [address]   RARE NEUTROPHILS    Direct Exam Abnormal  02/04/2021 10:14 AM [address] COCCI IN [place]    Direct Exam Abnormal  02/04/2021 10:14  [address]   RARE GRAM POSITIVE COCCI IN [place]    Culture Abnormal  02/04/2021 10:14 AM [address] LIGHT GROWTH    Culture Abnormal  02/04/2021 10:14 AM [address] GROWTH    Culture 02/04/2021 10:14 AM Jordon Nunez ID: GROUP D ENTEROCOCCUS MODERATE GROWTH    Culture 02/04/2021 10:14  [address]    Culture Abnormal  02/04/2021 10:14  [address]   This is a mixed culture of organisms, which may represent colonization or contamination.  Notify the laboratory within 7 days for further workup.  Susceptibilities have not been performed. Culture Abnormal  02/04/2021 10:14  [address]   NO ANAEROBIC ORGANISMS ISOLATED AT 4 DAYS      BLOOD    Special Requests 02/03/2021  8:40  [address] LT [facility]    Culture Abnormal  02/03/2021  8:40 PM [address] Blood Culture Results called to and read back by: HA Olivares. ON 02/07/21 AT 1035    Culture 02/03/2021  8:40 PM [address] FROM BOTTLE: [address] IN [place]    Culture Abnormal  02/03/2021  8:40 PM [address] For susceptibility, refer to previous culture.       BLOOD    Special Requests 02/03/2021  8:35  [facility] Street RT UPPER ARM    Culture Abnormal  02/03/2021  8:35 PM [address] Blood Culture Results called to and read back by: HA Jacques AT 3667 ON 2/4/21    Culture 02/03/2021  8:35 PM [address] FROM BOTTLE: GRAM POSITIVE COCCI IN [place]UST[place]    Culture Abnormal  02/03/2021  8:35 PM [address]    Methicillin resistant staphylococcus aureus (3)    Antibiotic Interpretation DARIAN Status    penicillin Resistant  Final     >=0.5   RESISTANT   cefoxitin screen  NOT REPORTED Final    ciprofloxacin   Final     NOT REPORTED    clindamycin Sensitive  Final     <=0.25   SUSCEPTIBLE   erythromycin Sensitive  Final     <=0.25 SUSCEPTIBLE   gentamicin Sensitive  Final     <=0.5   SUSCEPTIBLE   gentamicin Sensitive Gentamicin is used only in combination with other active agents that test susceptible. Final    Induced Clind Resist  NOT REPORTED Final    levofloxacin Intermediate  Final     4   INTERMEDIATE   linezolid   Final     NOT REPORTED    moxifloxacin   Final     NOT REPORTED    nitrofurantoin   Final     NOT REPORTED    oxacillin Resistant  Final     >=4   RESISTANT   Synercid   Final     NOT REPORTED    rifampin   Final     NOT REPORTED    tetracycline Sensitive  Final     <=1   SUSCEPTIBLE   tigecycline   Final     NOT REPORTED    trimethoprim-sulfamethoxazole Sensitive  Final     <=10   SUSCEPTIBLE   vancomycin Sensitive  Final     1   SUSCEPTIBLE             Medications:      budesonide-formoterol  2 puff Inhalation BID    famotidine  20 mg Oral BID    [Held by provider] insulin glargine  25 Units Subcutaneous BID    lactobacillus  1 tablet Oral BID    sodium chloride flush  10 mL Intravenous 2 times per day    cefepime  2,000 mg Intravenous Q8H    heparin (porcine)  5,000 Units Subcutaneous 3 times per day    vancomycin (VANCOCIN) intermittent dosing (placeholder)   Other RX Placeholder    vancomycin  1,000 mg Intravenous Q12H    insulin lispro  0-12 Units Subcutaneous TID WC    insulin lispro  0-6 Units Subcutaneous Nightly           [facility]  [address]  [employer] messaging  OFFICE: [phone]      Electronically signed by [address] [place] [address] Street, MD on 2/9/2021 at 4:42 PM  Thank you for allowing us to participate in the care of this patient. Please call with questions. This note iscreated with the assistance of a speech recognition program.  While intending to generate a document that actually reflects the content of the visit, the document can still have some errors including those of syntax andsound a like substitutions which may escape proof reading.   In such instances, actual meaning can be extrapolated by contextual diversion.

## 2021-02-09 NOTE — PROGRESS NOTES
Returned from PACU sleepy but arousable. Right leg dressing/ace dry, intact. daughter at bedside, denies discomfort. Wava Prim

## 2021-02-09 NOTE — PROGRESS NOTES
Occupational [address]  Occupational Therapy Not Seen Note    Patient not available for Occupational Therapy due to:    [] Testing:    [] Hemodialysis    [] Cancelled by RN:    []Refusal by Patient:    [] Surgery:     [] Intubation:     [] Pain Medication:    [] Sedation:     [] Spine Precautions :    [] Medical Instability:    [x] Other: Pt declined.  Having BKA at 1pm today    Jules Ayala OTR/L

## 2021-02-09 NOTE — PROGRESS NOTES
Pharmacy Note  Vancomycin Consult - Follow Up     Vancomycin Therapy Day: 2  Current Dosing: vancomycin 1000mg ivpb q12h   Current diagnosis for which MRSA is suspected/confirmed: sepsis  ONLY for suspected pneumonia or COPD: MRSA nasal swab   N/A: Non respiratory infection. .      Temp: 98.2  Actual Weight:   Wt Readings from Last 1 Encounters:   02/09/21 155 lb (70.3 kg)       Recent Labs     02/08/21  0428 02/09/21  0619   CREATININE 0.82 0.99     Calculate CrCl based on IBW: 76    Recent Labs     02/08/21  0428 02/09/21  0619   WBC 9.6 10.2       No intake or output data in the 24 hours ending 02/09/21 1156        ASSESSMENT/PLAN    Trough 19.5 today. Continue vancomycin 1000mg ivpb q12h . Thank you for the consult. Will Continue to follow.   Marilee Box RPh  2/9/2021  11:56 AM
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
## 2021-02-09 NOTE — CARE COORDINATION
Social Work-Met with patient and daughter,Marisela([phone]). Discussed dc options. Patient states that he would like to return to [facility]. Discussed that acute rehab may also be an option. Referral was made to [facility] and [address] [place] [address].  Fabiola Raymond
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
## 2021-02-10 LAB
GLUCOSE BLD-MCNC: 141 MG/DL (ref 75–110)
GLUCOSE BLD-MCNC: 193 MG/DL (ref 75–110)
GLUCOSE BLD-MCNC: 234 MG/DL (ref 75–110)
GLUCOSE BLD-MCNC: 293 MG/DL (ref 75–110)
GLUCOSE BLD-MCNC: 309 MG/DL (ref 75–110)

## 2021-02-10 PROCEDURE — 2580000003 HC RX 258: Performed by: SURGERY

## 2021-02-10 PROCEDURE — 97530 THERAPEUTIC ACTIVITIES: CPT

## 2021-02-10 PROCEDURE — 99222 1ST HOSP IP/OBS MODERATE 55: CPT | Performed by: PHYSICAL MEDICINE & REHABILITATION

## 2021-02-10 PROCEDURE — 6360000002 HC RX W HCPCS: Performed by: NURSE PRACTITIONER

## 2021-02-10 PROCEDURE — 2700000000 HC OXYGEN THERAPY PER DAY

## 2021-02-10 PROCEDURE — 82947 ASSAY GLUCOSE BLOOD QUANT: CPT

## 2021-02-10 PROCEDURE — 1200000000 HC SEMI PRIVATE

## 2021-02-10 PROCEDURE — 6370000000 HC RX 637 (ALT 250 FOR IP): Performed by: SURGERY

## 2021-02-10 PROCEDURE — 99233 SBSQ HOSP IP/OBS HIGH 50: CPT | Performed by: NURSE PRACTITIONER

## 2021-02-10 PROCEDURE — 97166 OT EVAL MOD COMPLEX 45 MIN: CPT

## 2021-02-10 PROCEDURE — 94640 AIRWAY INHALATION TREATMENT: CPT

## 2021-02-10 PROCEDURE — 97535 SELF CARE MNGMENT TRAINING: CPT

## 2021-02-10 PROCEDURE — 99232 SBSQ HOSP IP/OBS MODERATE 35: CPT | Performed by: NURSE PRACTITIONER

## 2021-02-10 PROCEDURE — 6360000002 HC RX W HCPCS: Performed by: SURGERY

## 2021-02-10 PROCEDURE — 97164 PT RE-EVAL EST PLAN CARE: CPT

## 2021-02-10 RX ORDER — HYDROMORPHONE HYDROCHLORIDE 1 MG/ML
1 INJECTION, SOLUTION INTRAMUSCULAR; INTRAVENOUS; SUBCUTANEOUS EVERY 4 HOURS PRN
Status: DISCONTINUED | OUTPATIENT
Start: 2021-02-10 | End: 2021-02-12 | Stop reason: HOSPADM

## 2021-02-10 RX ADMIN — INSULIN LISPRO 6 UNITS: 100 INJECTION, SOLUTION INTRAVENOUS; SUBCUTANEOUS at 07:54

## 2021-02-10 RX ADMIN — OXYCODONE AND ACETAMINOPHEN 1 TABLET: 5; 325 TABLET ORAL at 21:36

## 2021-02-10 RX ADMIN — OXYCODONE AND ACETAMINOPHEN 1 TABLET: 5; 325 TABLET ORAL at 12:30

## 2021-02-10 RX ADMIN — SODIUM CHLORIDE, PRESERVATIVE FREE 10 ML: 5 INJECTION INTRAVENOUS at 15:57

## 2021-02-10 RX ADMIN — BUDESONIDE AND FORMOTEROL FUMARATE DIHYDRATE 2 PUFF: 160; 4.5 AEROSOL RESPIRATORY (INHALATION) at 12:09

## 2021-02-10 RX ADMIN — CEFEPIME HYDROCHLORIDE 2000 MG: 2 INJECTION, POWDER, FOR SOLUTION INTRAVENOUS at 05:45

## 2021-02-10 RX ADMIN — INSULIN LISPRO 2 UNITS: 100 INJECTION, SOLUTION INTRAVENOUS; SUBCUTANEOUS at 12:41

## 2021-02-10 RX ADMIN — OXYCODONE AND ACETAMINOPHEN 1 TABLET: 5; 325 TABLET ORAL at 07:39

## 2021-02-10 RX ADMIN — CEFEPIME HYDROCHLORIDE 2000 MG: 2 INJECTION, POWDER, FOR SOLUTION INTRAVENOUS at 15:49

## 2021-02-10 RX ADMIN — VANCOMYCIN HYDROCHLORIDE 1000 MG: 1 INJECTION, POWDER, LYOPHILIZED, FOR SOLUTION INTRAVENOUS at 12:29

## 2021-02-10 RX ADMIN — BUDESONIDE AND FORMOTEROL FUMARATE DIHYDRATE 2 PUFF: 160; 4.5 AEROSOL RESPIRATORY (INHALATION) at 20:32

## 2021-02-10 RX ADMIN — HEPARIN SODIUM 5000 UNITS: 5000 INJECTION INTRAVENOUS; SUBCUTANEOUS at 05:45

## 2021-02-10 RX ADMIN — PROBIOTIC PRODUCT - TAB 1 TABLET: TAB at 21:28

## 2021-02-10 RX ADMIN — INSULIN LISPRO 4 UNITS: 100 INJECTION, SOLUTION INTRAVENOUS; SUBCUTANEOUS at 17:12

## 2021-02-10 RX ADMIN — CEFEPIME HYDROCHLORIDE 2000 MG: 2 INJECTION, POWDER, FOR SOLUTION INTRAVENOUS at 21:28

## 2021-02-10 RX ADMIN — MORPHINE SULFATE 3 MG: 4 INJECTION, SOLUTION INTRAMUSCULAR; INTRAVENOUS at 15:53

## 2021-02-10 RX ADMIN — HYDROMORPHONE HYDROCHLORIDE 1 MG: 1 INJECTION, SOLUTION INTRAMUSCULAR; INTRAVENOUS; SUBCUTANEOUS at 22:46

## 2021-02-10 RX ADMIN — MORPHINE SULFATE 3 MG: 4 INJECTION, SOLUTION INTRAMUSCULAR; INTRAVENOUS at 20:07

## 2021-02-10 RX ADMIN — HEPARIN SODIUM 5000 UNITS: 5000 INJECTION INTRAVENOUS; SUBCUTANEOUS at 21:28

## 2021-02-10 RX ADMIN — MORPHINE SULFATE 3 MG: 4 INJECTION, SOLUTION INTRAMUSCULAR; INTRAVENOUS at 08:58

## 2021-02-10 RX ADMIN — OXYCODONE AND ACETAMINOPHEN 1 TABLET: 5; 325 TABLET ORAL at 17:16

## 2021-02-10 RX ADMIN — FAMOTIDINE 20 MG: 20 TABLET, FILM COATED ORAL at 07:39

## 2021-02-10 RX ADMIN — PROBIOTIC PRODUCT - TAB 1 TABLET: TAB at 07:39

## 2021-02-10 RX ADMIN — HEPARIN SODIUM 5000 UNITS: 5000 INJECTION INTRAVENOUS; SUBCUTANEOUS at 15:49

## 2021-02-10 RX ADMIN — FAMOTIDINE 20 MG: 20 TABLET, FILM COATED ORAL at 21:28

## 2021-02-10 RX ADMIN — INSULIN LISPRO 3 UNITS: 100 INJECTION, SOLUTION INTRAVENOUS; SUBCUTANEOUS at 21:28

## 2021-02-10 ASSESSMENT — PAIN SCALES - GENERAL
PAINLEVEL_OUTOF10: 8
PAINLEVEL_OUTOF10: 8
PAINLEVEL_OUTOF10: 6

## 2021-02-10 ASSESSMENT — PAIN DESCRIPTION - ORIENTATION
ORIENTATION: LEFT

## 2021-02-10 ASSESSMENT — PAIN DESCRIPTION - FREQUENCY
FREQUENCY: CONTINUOUS
FREQUENCY: CONTINUOUS

## 2021-02-10 ASSESSMENT — PAIN - FUNCTIONAL ASSESSMENT: PAIN_FUNCTIONAL_ASSESSMENT: PREVENTS OR INTERFERES SOME ACTIVE ACTIVITIES AND ADLS

## 2021-02-10 ASSESSMENT — PAIN DESCRIPTION - DESCRIPTORS
DESCRIPTORS: ACHING;DISCOMFORT
DESCRIPTORS: ACHING;DISCOMFORT
DESCRIPTORS: DISCOMFORT;THROBBING

## 2021-02-10 ASSESSMENT — PAIN DESCRIPTION - LOCATION
LOCATION: LEG

## 2021-02-10 ASSESSMENT — ENCOUNTER SYMPTOMS
GASTROINTESTINAL NEGATIVE: 1
RESPIRATORY NEGATIVE: 1

## 2021-02-10 ASSESSMENT — PAIN DESCRIPTION - PAIN TYPE
TYPE: SURGICAL PAIN
TYPE: SURGICAL PAIN
TYPE: SURGICAL PAIN;CHRONIC PAIN

## 2021-02-10 ASSESSMENT — PAIN DESCRIPTION - ONSET: ONSET: ON-GOING
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
## 2021-02-10 NOTE — OP NOTE
[address] [facility] [address]                [address] [facility], [address] [place] [address]                                OPERATIVE REPORT    PATIENT NAME: Irving Kocher                  :        1955  MED REC NO:   [id number]                             ROOM:         ACCOUNT NO:   [de-identified]                           ADMIT DATE: 2021  PROVIDER:     Shamar Pineda MD    DATE OF PROCEDURE:  2021    PREOPERATIVE DIAGNOSIS:  Left foot gangrene/osteomyelitis. POSTOPERATIVE DIAGNOSIS:  Left foot gangrene/osteomyelitis. OPERATION PERFORMED:  Left below knee amputation. SURGEON:  Shamar Pineda MD    ANESTHESIA:  General endotracheal.    ESTIMATED BLOOD LOSS:  20 mL. COMPLICATIONS:  None. INDICATIONS:  The patient is a 82-year-old male, who previously  underwent left transmetatarsal amputation. Lateral aspect has been  nonhealing and the patient has osteomyelitis with large open gangrenous  wound. At this time, he is being taken to the operating room for  elective left below knee amputation. OPERATIVE PROCEDURE:  After informed consent had been obtained, the  patient was brought to the operating room, placed in a supine position,  and general endotracheal anesthesia was induced. Left leg was then  prepped and draped in the usual sterile fashion. An Esmarch was then  used on the leg and tourniquet inflated to 250 mmHg. A posterior flap  incision was then made and subcutaneous tissue sharply taken down with  bleeding points being controlled with electrocautery. The greater  saphenous vein was isolated and tied with 3-0 Vicryl and divided. The  musculature was then divided using electrocautery and the tibial vessels  were isolated and suture ligated with 2-0 Ethibond. Next, the  periosteum on the tibia was elevated along the fibula. The fibula was  divided using a bone cutter; however, the bone was noted to be extremely  friable and splinted. Remaining fragments were rongeured and rasped  smooth. The tibia was then divided using a Gigli saw in the anterior aspect was beveled. Remainder of the musculature was divided and the  specimen was sent to pathology. Tourniquet was deflated and the wound  was irrigated with Irrisept solution and the irrigation was evacuated. Small bleeding points were electrocauterized. Muscles were then trimmed  along the skin and the deep muscles were reapproximated using  interrupted 0 Vicryl suture followed by closure of the superficial  fascia using interrupted 0 Vicryl suture. Skin edges were then further  trimmed and the skin was reapproximated with surgical staples. Xeroform  and Kerlix dressing was then applied followed by a Coban dressing and a  sterile stockinette. The patient tolerated the procedure well and was  transferred to the recovery room in satisfactory condition. Sponge,  instrument, and needle counts were correct at the conclusion of the  operation.         Dick Boyer MD    D: 02/09/2021 15:04:03       T: 02/09/2021 19:56:15     [id number]  Job#: [id number]     Doc#: [id number]    CC:
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
## 2021-02-10 NOTE — PROGRESS NOTES
Pharmacy Note  Vancomycin Consult - Brief Note     Vancomycin Day: 3  Current Dose:  1000mg q12h  Patient's labs, cultures, vitals, and vancomycin regimen reviewed. No changes today. Current diagnosis for which MRSA is suspected/confirmed: sepsis  ONLY for suspected pneumonia or COPD: MRSA nasal swab  N/A: Non respiratory infection. .        TEMP: 97.7F      Recent Labs     02/08/21  0428 02/09/21  0619   WBC 9.6 10.2     Recent Labs     02/08/21  0428 02/09/21  0619   CREATININE 0.82 0.99     Estimated Creatinine Clearance: 74 mL/min (based on SCr of 0.99 mg/dL). Intake/Output Summary (Last 24 hours) at 2/10/2021 1503  Last data filed at 2/9/2021 1805  Gross per 24 hour   Intake 400 ml   Output 700 ml   Net -300 ml         Thank you for the consult. Pharmacy will continue to follow.   Radha Mijares RPdemetrio/PharmD  2/10/2021 3:03 PM

## 2021-02-10 NOTE — PROGRESS NOTES
[facility]  Progress Note    Paris Mathews  MRN: [id number]  Date: 2/10/2021  LOS: 2     Reason for F/U :   MRSA sepsis, OM of the left foot    Impression :   1. Left foot infection/nonhealing surgical wound with cuboid and fourth metatarsal bone exposure/osteomyelitis  · 2/9/2021 left below the knee amputation  2. MRSA bacteremia likely secondary to above VS PICC line infection  3. Peripheral arterial disease  4. Diabetes mellitus with associated neuropathy    Recommendations:   · Continue IV antimicrobial therapy with cefepime and vancomycin  · We will likely stop the cefepime soon  · Will need to assess the surgical site  · Patient will need to complete a 4-week course of IV vancomycin due to the MRSA bacteremia    Infection Control Recommendations:   Contact precautions    Discharge Planning:   Estimated Length of IV antimicrobials: 4 weeks  Patient will need Midline Catheter Insertion/ PICC line Insertion: No  Patient will need: Home IV , [place],  SNF,  LTAC: Undetermined  Patient willneed outpatient wound care: No    Medical Decision making / Summary of Stay:   Rosa Cardenas is a 63y.o.-year-old male who was initially admitted on 2/8/2021.   Rosa Cardenas is a 63y.o.-year-old male presented to hospital with worsening, nonhealing left foot wound at the left transmetatarsal amputation stump with bone exposure, underlying osteomyelitis of the first and fifth metatarsal status post recent incision and drainage for multiple abscesses tracking along the tendon and to the plantar aspect of the calcaneus on 1/5/2021 group B streptococcus and E. coli growth on culture. The patient had PICC line placed and was discharged on IV ceftriaxone that he was receiving at [facility] but did not receive upon discharge home according to the patient. He also had peripheral arterial disease and diabetes mellitus.   BKA was recommended but patient declined and wanted to try IV antibiotics and hyperbaric oxygen treatment. The patient had PICC line and was discharged    Current evaluation:2/10/2021    BP (!) 151/60   Pulse 94   Temp 97.7 °F (36.5 °C) (Oral)   Resp 16   Ht 6' 1\" (1.854 m)   Wt 152 lb (68.9 kg)   SpO2 97%   BMI 20.05 kg/m²     Temperature Range: Temp: 97.7 °F (36.5 °C) Temp  Av.8 °F (37.1 °C)  Min: 95.9 °F (35.5 °C)  Max: 99.3 °F (37.4 °C)    Patient seen and evaluated sitting up in bed  He continues to complain of pain in the left lower extremity    Review of Systems   Constitutional: Negative. HENT: Negative. Respiratory: Negative. Cardiovascular: Negative. Gastrointestinal: Negative. Genitourinary: Negative. Musculoskeletal:        Left lower ext pain   Skin: Negative. Neurological: Negative. Physical Examination :     Physical Exam  Constitutional:       General: He is not in acute distress. Appearance: Normal appearance. He is normal weight. HENT:      Head: Normocephalic and atraumatic. Pulmonary:      Effort: Pulmonary effort is normal. No respiratory distress. Musculoskeletal:      Comments: Right BKA. Left BKA with dressing in place, not removed   Skin:     General: Skin is warm and dry. Neurological:      General: No focal deficit present. Mental Status: He is alert.          Laboratory data:   I have independently reviewed the followinglabs:  CBC with Differential:   Recent Labs     2119   WBC 9.6 10.2   HGB 8.1* 8.9*   HCT 25.9* 30.5*   * 549*     BMP:   Recent Labs     21    141   K 4.3 4.1    106   CO2 25 25   BUN 14 20   CREATININE 0.82 0.99     Hepatic Function Panel:   Recent Labs     21   PROT 6.1*   LABALBU 2.5*   BILITOT <0.15*   ALKPHOS 83   ALT 5   AST 5         No results found for: PROCAL  Lab Results   Component Value Date    .0 2021    CRP 46.6 2021    .8 2021     Lab Results   Component Value Date    SEDRATE 39 (H) 02/03/2021         Lab Results   Component Value Date    DDIMER 0.39 06/29/2020    DDIMER 1.63 12/20/2017    DDIMER 0.68 12/25/2011     Lab Results   Component Value Date    FERRITIN 56 02/09/2021    FERRITIN 64 01/25/2014     No results found for: LDH  No results found for: FIBRINOGEN    Results in Past 30 Days  Result Component Current Result Ref Range Previous Result Ref Range   SARS-CoV-2      (2/4/2021)  Not in Time Range          (2/4/2021)       Not Detected (2/4/2021) Not Detected       Lab Results   Component Value Date    COVID19 Not Detected 02/04/2021    COVID19 Not Detected 12/31/2020    COVID19 Not Detected 11/10/2020    COVID19 Not Detected 08/23/2020       Recent Labs     02/07/21 2041 02/09/21  1117   [facility] 22.7* 19.5       Imaging Studies:   No new imaging    Cultures:     2/5/2021 11:08 PM - Alfonso, Anika Incoming Lab Results From [facility]    Specimen Information: Blood        Component Collected Lab   Specimen Description 02/03/2021  8:35  [facility]   . BLOOD    Special Requests 02/03/2021  8:35  [facility] RT UPPER ARM    Culture Abnormal  02/03/2021  8:35 PM [address] Blood Culture Results called to and read back by: HA Campos AT 9694 ON 2/4/21    Culture 02/03/2021  8:35 PM [facility] FROM BOTTLE: Mikey Morales POSITIVE COCCI IN CLUSTERS    Culture Abnormal  02/03/2021  8:35 PM [address]    Testing Performed By    Lab - Abbreviation Name Director Address Valid Date Range   [address]  [address] [address] 08/30/17 0801-Present   [address] [place] [address] [address] [place] [address], [address] [place] [id number] 07/02/19 0752-02/09/21 0716   Susceptibility    Methicillin resistant staphylococcus aureus (3)    Antibiotic Interpretation DARIAN Status    penicillin Resistant  Final     >=0.5   RESISTANT   cefoxitin screen  NOT REPORTED Final    ciprofloxacin   Final     NOT REPORTED    clindamycin Sensitive  Final     <=0.25   SUSCEPTIBLE   erythromycin Sensitive  Final     <=0.25   SUSCEPTIBLE   gentamicin Sensitive  Final     <=0.5   SUSCEPTIBLE   gentamicin Sensitive Gentamicin is used only in combination with other active agents that test susceptible. Final    Induced Clind Resist  NOT REPORTED Final    levofloxacin Intermediate  Final     4   INTERMEDIATE   linezolid   Final     NOT REPORTED    moxifloxacin   Final     NOT REPORTED    nitrofurantoin   Final     NOT REPORTED    oxacillin Resistant  Final     >=4   RESISTANT   Synercid   Final     NOT REPORTED    rifampin   Final     NOT REPORTED    tetracycline Sensitive  Final     <=1   SUSCEPTIBLE   tigecycline   Final     NOT REPORTED    trimethoprim-sulfamethoxazole Sensitive  Final     <=10   SUSCEPTIBLE   vancomycin Sensitive  Final     1   SUSCEPTIBLE   Lab and Collection    Culture, Blood 1 - 2/3/2021  Result History    Culture, Blood 1 on 2/5/2021 2/9/2021  4:15 PM - AlfonsoAnika montanez Incoming Lab Results From [facility]    Specimen Information: Foot        Component Collected Lab   Specimen Description 02/04/2021 10:14 AM [facility]   . FOOT LEFT    Special Requests 02/04/2021 10:14  [address] [facility]   NOT REPORTED    Direct Exam Abnormal  02/04/2021 10:14  [address]   RARE NEUTROPHILS    Direct Exam Abnormal  02/04/2021 10:14  [address]   FEW GRAM POSITIVE COCCI IN [place]    Direct Exam Abnormal  02/04/2021 10:14  [address]   RARE GRAM POSITIVE COCCI IN CLUSTERS    Culture Abnormal  02/04/2021 10:14 AM Britt Posrclas [address] RODS LIGHT GROWTH    Culture Abnormal  02/04/2021 10:14 AM [facility] [address]   STAPHYLOCOCCUS AUREUS MODERATE GROWTH    Culture 02/04/2021 10:14 AM Jordon Nunez ID: GROUP D ENTEROCOCCUS MODERATE GROWTH    Culture 02/04/2021 10:14  [address]    Culture Abnormal  02/04/2021 10:14  [address]   This is a mixed culture of organisms, which may represent colonization or contamination.  Notify the laboratory within 7 days for further workup.  Susceptibilities have not been performed. Culture Abnormal  02/04/2021 10:14  [address]   NO ANAEROBIC ORGANISMS ISOLATED AT 5 DAYS    Testing Performed By    Lab - Abbreviation Name Director Address Valid Date Range   [address],  [address] [address] 08/30/17 0801-Present   [address] [place] [address] [facility] [place] [address] [facility] [address] 07/02/19 0752-02/09/21 0716   Lab and Collection    Culture, Anaerobic and Aerobic - 2/4/2021  Result History    Culture, Anaerobic and Aerobic on 2/9/2021         Medications:      budesonide-formoterol  2 puff Inhalation BID    famotidine  20 mg Oral BID    [Held by provider] insulin glargine  25 Units Subcutaneous BID    lactobacillus  1 tablet Oral BID    sodium chloride flush  10 mL Intravenous 2 times per day    cefepime  2,000 mg Intravenous Q8H    heparin (porcine)  5,000 Units Subcutaneous 3 times per day    vancomycin (VANCOCIN) intermittent dosing (placeholder)   Other RX Placeholder    vancomycin  1,000 mg Intravenous Q12H    insulin lispro  0-12 Units Subcutaneous TID WC    insulin lispro  0-6 Units Subcutaneous Nightly           [facility]  [address] [place] [address]  Perfect Serve messaging  OFFICE: [phone]      Electronically signed by [address] [place] [address], APRN - CNP on 2/10/2021 at 8:26 AM  Thank you for allowing us to participate in the care of this patient. Please call with questions. This note iscreated with the assistance of a speech recognition program.  While intending to generate a document that actually reflects the content of the visit, the document can still have some errors including those of syntax andsound a like substitutions which may escape proof reading. In such instances, actual meaning can be extrapolated by contextual diversion.

## 2021-02-10 NOTE — CARE COORDINATION
Social Work- Patient was not approved by [employer], [employer], [facility]. Faxed to [address], [employer], [address] [place] [address], [place], and [address]. [employer] and [facility] approved patient for admission. Discussed with patient.  Steve Babin

## 2021-02-10 NOTE — PROGRESS NOTES
Comprehensive Nutrition Assessment    Type and Reason for Visit:  Reassess    Nutrition Recommendations/Plan:   1. Continue DIET CARB CONTROL; Carb Control: 4 carbs/meal (approximate 1800 kcals/day); No Added Salt (3-4 GM)  2. Re-start Glucerna 2x/day  3. Monitor p.o intakes and labs    Nutrition Assessment:  Patient admission is related to chronic osteomyelitis of left foot. Patient is status post left BKA due to gangrene and osteomyelitis (2/9). Patient has a good appetite and is eating well. Re-start Glucerna 2x/day. Monitor p.o intakes and labs. Malnutrition Assessment:  Malnutrition Status:  No malnutrition      Estimated Daily Nutrient Needs:  Energy (kcal):  7062-5995 kcal based on 28-30 kcal/kg; Weight Used for Energy Requirements:  Adjusted(73.2 kg)     Protein (g):  88-95 gm protein baed on 1.2-1.3 gm/kg; Weight Used for Protein Requirements:  Adjusted          Nutrition Related Findings:  Active bowel sounds. Left BKA 2/9      Wounds:  Surgical Incision       Current Nutrition Therapies:    DIET CARB CONTROL; Carb Control: 4 carbs/meal (approximate 1800 kcals/day); No Added Salt (3-4 GM)  Dietary Nutrition Supplements: Diabetic Oral Supplement    Anthropometric Measures:  · Height: 6' 1\" (185.4 cm)  · Current Body Weight: 152 lb (68.9 kg)(status post BKA)   · Admission Body Weight: 155 lb (70.3 kg)    · Usual Body Weight: 162 lb (73.5 kg)     · Ideal Body Weight: 184 lbs; % Ideal Body Weight 82.6 %   · BMI: 20.1  · Adjusted Body Weight: 161; Amputation   · Adjusted BMI: 21.3    · BMI Categories: Normal Weight (BMI 18.5-24. 9)       Nutrition Diagnosis:   · Increased nutrient needs related to increase demand for energy/nutrients(healing) as evidenced by wounds(left BKA)      Nutrition Interventions:   Food and/or Nutrient Delivery:  Continue Current Diet, Start Oral Nutrition Supplement  Nutrition Education/Counseling:  Education not indicated   Coordination of Nutrition Care:  Continue to monitor while inpatient    Goals:   P.O intakes are greater than 75% at meals       Nutrition Monitoring and Evaluation:   Food/Nutrient Intake Outcomes:  Food and Nutrient Intake, Supplement Intake  Physical Signs/Symptoms Outcomes:  Biochemical Data, Fluid Status or Edema, Skin, Weight     Discharge Planning:    Continue current diet, Continue Oral Nutrition Supplement         Karlyne Dakin MFN, RDN, LDN  Lead Clinical Dietitian  RD Office Phone [phone]

## 2021-02-10 NOTE — PROGRESS NOTES
VASCULAR SURGERY  PROGRESS NOTE      2/10/2021 5:23 PM  Subjective:   Admit Date: 2/8/2021  PCP: Mar Coffey DO    No chief complaint on file. Interval History: No complaints. Left BKA dressing intact. Diet: DIET CARB CONTROL; Carb Control: 4 carbs/meal (approximate 1800 kcals/day); No Added Salt (3-4 GM)  Dietary Nutrition Supplements: Diabetic Oral Supplement    Medications:   Scheduled Meds:   budesonide-formoterol  2 puff Inhalation BID    famotidine  20 mg Oral BID    [Held by provider] insulin glargine  25 Units Subcutaneous BID    lactobacillus  1 tablet Oral BID    sodium chloride flush  10 mL Intravenous 2 times per day    cefepime  2,000 mg Intravenous Q8H    heparin (porcine)  5,000 Units Subcutaneous 3 times per day    vancomycin (VANCOCIN) intermittent dosing (placeholder)   Other RX Placeholder    vancomycin  1,000 mg Intravenous Q12H    insulin lispro  0-12 Units Subcutaneous TID WC    insulin lispro  0-6 Units Subcutaneous Nightly     Continuous Infusions:   dextrose           Labs:   CBC:   Recent Labs     02/08/21 0428 02/09/21 0619   WBC 9.6 10.2   HGB 8.1* 8.9*   * 549*     BMP:    Recent Labs     02/08/21 0428 02/09/21 0619    141   K 4.3 4.1    106   CO2 25 25   BUN 14 20   CREATININE 0.82 0.99   GLUCOSE 174* 187*     Hepatic:   Recent Labs     02/08/21 0428   AST 5   ALT 5   BILITOT <0.15*   ALKPHOS 83     Troponin: Invalid input(s): TROPONIN  BNP: No results for input(s): BNP in the last 72 hours. Lipids: No results for input(s): CHOL, HDL in the last 72 hours.     Invalid input(s): LDLCALCU  INR:   Recent Labs     02/09/21 0619   INR 1.0       Objective:   Vitals: BP (!) 151/60   Pulse 94   Temp 97.7 °F (36.5 °C) (Oral)   Resp 16   Ht 6' 1\" (1.854 m)   Wt 152 lb (68.9 kg)   SpO2 97%   BMI 20.05 kg/m²   General appearance: alert, cooperative and no distress  Mental Status: oriented to person, place and time with normal affect  Neck: good carotid pulses, no JVD  Lungs: clear to auscultation bilaterally, normal effort  Heart: regular rate and rhythm, no murmur,  Abdomen: soft, non-tender, non-distended, bowel sounds present all four quadrants, no masses, hepatomegaly, splenomegaly or aortic enlargement  Extremities: Right BKA site normal.  Left BKA dressing intact  Skin: no gross lesions, rashes, or induration    Assessment:   3 61-year-old male stable status post left below-knee amputation    Patient Active Problem List:     Type 2 diabetes mellitus with diabetic polyneuropathy, with long-term current use of insulin (HCC)     Neuropathic pain, leg     Diabetic polyneuropathy (HCC)     Allergic rhinitis     Tobacco dependence     Edentulous     Dysphagia     Lung nodules     Esophageal cancer (HCC)     Fracture of humerus, left, closed     Closed fracture of humerus     DM type 2 with diabetic peripheral neuropathy (HCC)     Chronic obstructive pulmonary disease (HCC)     HLD (hyperlipidemia)     Gastroesophageal reflux disease     Chronic pain syndrome     Marijuana use     History of colon polyps     Functional diarrhea     Sepsis due to methicillin resistant Staphylococcus aureus (MRSA) ([facility])     History of esophageal cancer     Osteomyelitis, chronic, ankle or foot ([facility])     PAD (peripheral artery disease) ([address]     Other pulmonary embolism without acute cor pulmonale (HCC)     Carotid stenosis, asymptomatic, bilateral     Lower limb amputation status     Fx humeral neck, right, closed, initial encounter     Transient hypotension     Constipation due to opioid therapy     Acute kidney injury ([address]     Diabetic ulcer of left midfoot associated with type 2 diabetes mellitus, with fat layer exposed ([address]     Complication of below knee amputation stump (HCC)     COPD exacerbation (HCC)     Hyponatremia     Pain, phantom limb ([address]     Below-knee amputation of right lower extremity (HCC)     Moderate protein malnutrition ([address]     Essential hypertension     Uncontrolled type 2 diabetes mellitus with hyperglycemia (HCC)     History of pulmonary embolism - 2017     Atelectasis     Uncontrolled type 2 diabetes mellitus with foot ulcer (HCC)     Azotemia     Anemia, normocytic normochromic     Drug-induced constipation     Osteomyelitis of metatarsals of left foot (HCC)     Diabetic foot infection ([address]     Noncompliance     Type 1 diabetes mellitus with diabetic foot infection ([address]     Acute osteomyelitis of left foot (HCC)     Cellulitis of left foot     Mild malnutrition (HCC)     Diabetic infection of left foot (HCC)     Hypocalcemia     Hypokalemia     Moderate malnutrition (HCC)     Diabetic ulcer of left midfoot associated with type 2 diabetes mellitus, with necrosis of bone ([address]     History of below knee amputation, right ([address]     Foot ulcer with fat layer exposed, left ([address]     Chronic refractory osteomyelitis of left foot (HCC)     Sepsis ([address]     Pyogenic inflammation of bone ([address]      Plan:   1. Plan to take down left BKA dressing tomorrow afternoon or Friday morning. 2. Fit with stump . 3. Likely plan discharge to rehab on Friday if okay with medicine.     Electronically signed by Huan Torres MD on 2/10/2021 at 5:23 PM
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
## 2021-02-11 LAB
GLUCOSE BLD-MCNC: 253 MG/DL (ref 75–110)
GLUCOSE BLD-MCNC: 254 MG/DL (ref 75–110)
GLUCOSE BLD-MCNC: 259 MG/DL (ref 75–110)
GLUCOSE BLD-MCNC: 262 MG/DL (ref 75–110)
GLUCOSE BLD-MCNC: 264 MG/DL (ref 75–110)
IRON SATURATION: 8 % (ref 20–55)
IRON: 17 UG/DL (ref 59–158)
SURGICAL PATHOLOGY REPORT: NORMAL
TOTAL IRON BINDING CAPACITY: 212 UG/DL (ref 250–450)
UNSATURATED IRON BINDING CAPACITY: 195 UG/DL (ref 112–347)

## 2021-02-11 PROCEDURE — 94640 AIRWAY INHALATION TREATMENT: CPT

## 2021-02-11 PROCEDURE — 99232 SBSQ HOSP IP/OBS MODERATE 35: CPT | Performed by: NURSE PRACTITIONER

## 2021-02-11 PROCEDURE — 1200000000 HC SEMI PRIVATE

## 2021-02-11 PROCEDURE — 6360000002 HC RX W HCPCS: Performed by: SURGERY

## 2021-02-11 PROCEDURE — 6360000002 HC RX W HCPCS: Performed by: NURSE PRACTITIONER

## 2021-02-11 PROCEDURE — 82947 ASSAY GLUCOSE BLOOD QUANT: CPT

## 2021-02-11 PROCEDURE — 2580000003 HC RX 258: Performed by: SURGERY

## 2021-02-11 PROCEDURE — 6370000000 HC RX 637 (ALT 250 FOR IP): Performed by: SURGERY

## 2021-02-11 PROCEDURE — 97530 THERAPEUTIC ACTIVITIES: CPT | Performed by: NURSE PRACTITIONER

## 2021-02-11 PROCEDURE — 99232 SBSQ HOSP IP/OBS MODERATE 35: CPT | Performed by: INTERNAL MEDICINE

## 2021-02-11 PROCEDURE — 97112 NEUROMUSCULAR REEDUCATION: CPT

## 2021-02-11 PROCEDURE — 97530 THERAPEUTIC ACTIVITIES: CPT

## 2021-02-11 RX ADMIN — SODIUM CHLORIDE, PRESERVATIVE FREE 10 ML: 5 INJECTION INTRAVENOUS at 09:50

## 2021-02-11 RX ADMIN — OXYCODONE AND ACETAMINOPHEN 1 TABLET: 5; 325 TABLET ORAL at 11:19

## 2021-02-11 RX ADMIN — OXYCODONE AND ACETAMINOPHEN 1 TABLET: 5; 325 TABLET ORAL at 05:40

## 2021-02-11 RX ADMIN — PROBIOTIC PRODUCT - TAB 1 TABLET: TAB at 08:59

## 2021-02-11 RX ADMIN — VANCOMYCIN HYDROCHLORIDE 1000 MG: 1 INJECTION, POWDER, LYOPHILIZED, FOR SOLUTION INTRAVENOUS at 11:56

## 2021-02-11 RX ADMIN — BUDESONIDE AND FORMOTEROL FUMARATE DIHYDRATE 2 PUFF: 160; 4.5 AEROSOL RESPIRATORY (INHALATION) at 09:27

## 2021-02-11 RX ADMIN — OXYCODONE AND ACETAMINOPHEN 1 TABLET: 5; 325 TABLET ORAL at 21:40

## 2021-02-11 RX ADMIN — OXYCODONE AND ACETAMINOPHEN 1 TABLET: 5; 325 TABLET ORAL at 01:37

## 2021-02-11 RX ADMIN — OXYCODONE AND ACETAMINOPHEN 1 TABLET: 5; 325 TABLET ORAL at 15:22

## 2021-02-11 RX ADMIN — FAMOTIDINE 20 MG: 20 TABLET, FILM COATED ORAL at 08:59

## 2021-02-11 RX ADMIN — HEPARIN SODIUM 5000 UNITS: 5000 INJECTION INTRAVENOUS; SUBCUTANEOUS at 14:14

## 2021-02-11 RX ADMIN — HYDROMORPHONE HYDROCHLORIDE 1 MG: 1 INJECTION, SOLUTION INTRAMUSCULAR; INTRAVENOUS; SUBCUTANEOUS at 06:50

## 2021-02-11 RX ADMIN — ALUMINUM HYDROXIDE, MAGNESIUM HYDROXIDE, AND SIMETHICONE 30 ML: 200; 200; 20 SUSPENSION ORAL at 23:49

## 2021-02-11 RX ADMIN — HYDROMORPHONE HYDROCHLORIDE 1 MG: 1 INJECTION, SOLUTION INTRAMUSCULAR; INTRAVENOUS; SUBCUTANEOUS at 12:50

## 2021-02-11 RX ADMIN — HYDROMORPHONE HYDROCHLORIDE 1 MG: 1 INJECTION, SOLUTION INTRAMUSCULAR; INTRAVENOUS; SUBCUTANEOUS at 19:27

## 2021-02-11 RX ADMIN — VANCOMYCIN HYDROCHLORIDE 1000 MG: 1 INJECTION, POWDER, LYOPHILIZED, FOR SOLUTION INTRAVENOUS at 01:16

## 2021-02-11 RX ADMIN — INSULIN LISPRO 3 UNITS: 100 INJECTION, SOLUTION INTRAVENOUS; SUBCUTANEOUS at 21:40

## 2021-02-11 RX ADMIN — HEPARIN SODIUM 5000 UNITS: 5000 INJECTION INTRAVENOUS; SUBCUTANEOUS at 21:40

## 2021-02-11 RX ADMIN — INSULIN LISPRO 6 UNITS: 100 INJECTION, SOLUTION INTRAVENOUS; SUBCUTANEOUS at 17:49

## 2021-02-11 RX ADMIN — ALUMINUM HYDROXIDE, MAGNESIUM HYDROXIDE, AND SIMETHICONE 30 ML: 200; 200; 20 SUSPENSION ORAL at 04:56

## 2021-02-11 RX ADMIN — HEPARIN SODIUM 5000 UNITS: 5000 INJECTION INTRAVENOUS; SUBCUTANEOUS at 05:40

## 2021-02-11 RX ADMIN — HYDROMORPHONE HYDROCHLORIDE 1 MG: 1 INJECTION, SOLUTION INTRAMUSCULAR; INTRAVENOUS; SUBCUTANEOUS at 02:47

## 2021-02-11 RX ADMIN — ALUMINUM HYDROXIDE, MAGNESIUM HYDROXIDE, AND SIMETHICONE 30 ML: 200; 200; 20 SUSPENSION ORAL at 11:19

## 2021-02-11 RX ADMIN — INSULIN LISPRO 6 UNITS: 100 INJECTION, SOLUTION INTRAVENOUS; SUBCUTANEOUS at 11:56

## 2021-02-11 RX ADMIN — CEFEPIME HYDROCHLORIDE 2000 MG: 2 INJECTION, POWDER, FOR SOLUTION INTRAVENOUS at 21:40

## 2021-02-11 RX ADMIN — PROBIOTIC PRODUCT - TAB 1 TABLET: TAB at 19:41

## 2021-02-11 RX ADMIN — INSULIN LISPRO 6 UNITS: 100 INJECTION, SOLUTION INTRAVENOUS; SUBCUTANEOUS at 09:02

## 2021-02-11 RX ADMIN — BUDESONIDE AND FORMOTEROL FUMARATE DIHYDRATE 2 PUFF: 160; 4.5 AEROSOL RESPIRATORY (INHALATION) at 20:22

## 2021-02-11 RX ADMIN — FAMOTIDINE 20 MG: 20 TABLET, FILM COATED ORAL at 19:41

## 2021-02-11 RX ADMIN — CEFEPIME HYDROCHLORIDE 2000 MG: 2 INJECTION, POWDER, FOR SOLUTION INTRAVENOUS at 05:40

## 2021-02-11 RX ADMIN — CEFEPIME HYDROCHLORIDE 2000 MG: 2 INJECTION, POWDER, FOR SOLUTION INTRAVENOUS at 14:14

## 2021-02-11 ASSESSMENT — PAIN SCALES - GENERAL
PAINLEVEL_OUTOF10: 8
PAINLEVEL_OUTOF10: 10
PAINLEVEL_OUTOF10: 9
PAINLEVEL_OUTOF10: 8

## 2021-02-11 ASSESSMENT — ENCOUNTER SYMPTOMS
ALLERGIC/IMMUNOLOGIC NEGATIVE: 1
GASTROINTESTINAL NEGATIVE: 1
RESPIRATORY NEGATIVE: 1

## 2021-02-11 ASSESSMENT — PAIN DESCRIPTION - LOCATION: LOCATION: LEG

## 2021-02-11 ASSESSMENT — PAIN DESCRIPTION - DESCRIPTORS: DESCRIPTORS: THROBBING;DISCOMFORT
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
## 2021-02-11 NOTE — CARE COORDINATION
Social Work- [facility] [place],[employer], and [employer] do not allow pets to visit. [employer] will allow his dog, if it is on a leash and he has documented proof of vaccination. Family will need to hand leash to a staff member. Discussed with patient. He would like [employer]. Notified [employer] and they will begin precert.  Jenniffer Johnston

## 2021-02-11 NOTE — DISCHARGE INSTR - COC
Continuity of Care Form    Patient Name: Paris Mathews   :  1955  MRN:  [id number]    Admit date:  2021  Discharge date:  2021    Code Status Order: Full Code   Advance Directives:   Advance Care Flowsheet Documentation       Date/Time Healthcare Directive Type of Healthcare Directive Copy in [address] Agent's Name Healthcare Agent's Phone Number    21 1400  No, patient does not have an advance directive for healthcare treatment -- -- -- -- --            Admitting Physician:  Rafal Almeida DO  PCP: Milton Post DO    Discharging Nurse: [facility] Unit/Room#:   Discharging Unit Phone Number: [phone]    Emergency Contact:   Extended Emergency Contact Information  Primary Emergency Contact: Nikky Hurd  Address: [facility] Phone: [phone]  Relation: Parent  Secondary Emergency Contact: Corinne [address] Phone: [phone]  Mobile Phone: [phone]  Relation: Child    Past Surgical History:  Past Surgical History:   Procedure Laterality Date    COLONOSCOPY  2015    hyperplastic polyp    COLONOSCOPY  2017    ESOPHAGECTOMY      cancer    FOOT DEBRIDEMENT Left 2021    I&D LEFT FOOT WITH REMOVAL OF NONVIABLE BONE AND SOFT TISSUE performed by Gwyn Scott DPM at [address] [place] [address] Left 2021    LEFT FOOT DEBRIDEMENT WITH REMOVAL ALL NON VIABLE SOFT TISSUE AND BONE performed by Gwyn Scott DPM at [facility] Right 2016    I & D heel    FOOT SURGERY Right 2016    I & D    FRACTURE SURGERY Left 2015    humerus left, left leg    IR INS PICC VAD W SQ PORT GREATER THAN 5  2020    IR INS PICC VAD W SQ PORT GREATER THAN 5 2020 MD [facility]    IR INS PICC VAD W SQ PORT GREATER THAN 5  2021    IR INS PICC VAD W SQ PORT GREATER THAN 5 2021 Edward Washburn MD [facility]    LEG AMPUTATION BELOW KNEE Right 01/21/2017    LEG AMPUTATION BELOW KNEE Left 2/9/2021    LEFT  LEG AMPUTATION BELOW KNEE performed by Deisi Aguayo MD at [address] Right 2014    rt 3rd through 5th digits    TOE AMPUTATION Left 5/26/2016    left foot 5th toe    TOE AMPUTATION Left 8/5/2020    FOOT TRANSMETATARSAL  AMPUTATION - AND LEFT PECUTANEOUS TENDO ACHILLES LENGTHENING performed by Codi Mojica DPM at [address] Left 8/24/2020    REVISION  TRANSMETATARSAL AMPUTATION WITH DEBRIDEMENT. performed by Codi Mojica DPM at [facility]      5/14/13- with dilation    VASCULAR SURGERY Right 01/16/2017    foot guillotine amputation       Immunization History:   Immunization History   Administered Date(s) Administered    Influenza Vaccine, unspecified formulation 10/10/2016    Influenza Virus Vaccine 11/03/2014, 10/29/2015    Influenza, Evlyn Clam, IM, (6 mo and older Fluzone, Flulaval, Fluarix and 3 yrs and older Afluria) 10/10/2016    Pneumococcal Polysaccharide (Yzruneram93) 09/05/2012, 10/29/2015    Tdap (Boostrix, Adacel) 07/01/2019       Active Problems:  Patient Active Problem List   Diagnosis Code    Type 2 diabetes mellitus with diabetic polyneuropathy, with long-term current use of insulin ([facility]) E11.42, Z79.4    Neuropathic pain, leg M79.2    Diabetic polyneuropathy ([facility] E11.42    Allergic rhinitis J30.9    Tobacco dependence F17.200    Edentulous K08.109    Dysphagia R13.10    Lung nodules R91.8    Esophageal cancer ([facility]) C15.9    Fracture of humerus, left, closed S42.302A    Closed fracture of humerus S42.309A    DM type 2 with diabetic peripheral neuropathy ([facility]) E11.42    Chronic obstructive pulmonary disease ([facility]) J44.9    HLD (hyperlipidemia) E78.5    Gastroesophageal reflux disease K21.9    Chronic pain syndrome G89.4    Marijuana use F12.90    History of colon polyps Z86.010    Functional diarrhea K59.1 Foot ulcer with fat layer exposed, left ([facility] L97.522    Chronic refractory osteomyelitis of left foot ([facility]) M86.672    Sepsis ([facility] A41.9    Pyogenic inflammation of bone ([facility] M86.9       Isolation/Infection:   Isolation            Contact          Patient Infection Status       Infection Onset Added Last Indicated Last Indicated By Review Planned Expiration Resolved Resolved By    MRSA 02/03/21 02/05/21 02/03/21 Culture, Blood 1        2/2021 blood    VRE 08/24/20 08/27/20 08/24/20 Culture, Anaerobic and Aerobic        Foot - 12/2020    Resolved    C-diff Rule Out 01/11/21 01/11/21 01/11/21 C DIFF TOXIN/ANTIGEN (Ordered)   02/05/21 Danya Dewitt RN    COVID-19 Rule Out 08/22/20 08/22/20 08/23/20 COVID-19 (Ordered)   08/23/20 Rule-Out Test Resulted    COVID-19 Rule Out 08/01/20 08/01/20 08/01/20 COVID-19 (Ordered)   08/02/20 Shante Campos RN    Test discontinued - negative result this admission    MRSA  01/02/17 01/02/17 Shante Campos RN   07/31/20 Shante Campos RN    2 negative nasal screen - 2020  Foot - 6/2018            Nurse Assessment:  Last Vital Signs: BP (!) 133/57   Pulse 80   Temp 98.3 °F (36.8 °C) (Oral)   Resp 18   Ht 6' 1\" (1.854 m)   Wt 154 lb 1.6 oz (69.9 kg)   SpO2 96%   BMI 20.33 kg/m²     Last documented pain score (0-10 scale): Pain Level: 8  Last Weight:   Wt Readings from Last 1 Encounters:   02/11/21 154 lb 1.6 oz (69.9 kg)     Mental Status:  oriented, alert, coherent, logical, thought processes intact and able to concentrate and follow conversation    IV Access:  - PICC - site  R Basilic, insertion date: 01/11/2021    Nursing Mobility/ADLs:  Walking   Assisted  Transfer  Assisted  Bathing  Assisted  Dressing  Assisted  Toileting  Assisted  Feeding  Independent  Med Admin  Independent  Med Delivery   whole    Wound Care Documentation and Therapy:        Elimination:  Continence:    Bowel: Yes  Bladder: Yes  Urinary Catheter: None   Colostomy/Ileostomy/Ileal Conduit: No Date of Last BM: 02/12/2021    Intake/Output Summary (Last 24 hours) at 2/11/2021 1656  Last data filed at 2/10/2021 2253  Gross per 24 hour   Intake --   Output 300 ml   Net -300 ml     I/O last 3 completed shifts:  In: -   Out: 300 [Urine:300]    Safety Concerns: At Risk for Falls    Impairments/Disabilities:      Amputation - BKA-Leg and Right Leg    Nutrition Therapy:  Current Nutrition Therapy:   - Oral Diet:  Carb Control 4 carbs/meal (1800kcals/day)    Routes of Feeding: Oral  Liquids: No Restrictions  Daily Fluid Restriction: no  Last Modified Barium Swallow with Video (Video Swallowing Test): Not done    Treatments at the Time of Hospital Discharge:   Respiratory Treatments: ***  Oxygen Therapy:  is not on home oxygen therapy. Ventilator:    - No ventilator support    Rehab Therapies: Physical Therapy,Occupational Therapy and Orthotics/Prosthetics  Weight Bearing Status/Restrictions: No weight bearing restirctions  Other Medical Equipment (for information only, NOT a DME order):  wheelchair and walker  Other Treatments: ***    Patient's personal belongings (please select all that are sent with patient):  Cell Phone &     RN SIGNATURE:  Electronically signed by Renetta West RN on 2/12/21 at 3:07 PM EST    CASE MANAGEMENT/SOCIAL WORK SECTION    Inpatient Status Date: ***    Readmission Risk Assessment Score:  Readmission Risk              Risk of Unplanned Readmission:        57           Discharging to Facility/ Agency   Name:Name: [facility]  Address: [address] [place] [address].  [place], [address]  Phone:  [phone]  Fax: [phone]   Address:  Phone:  Fax:    Dialysis Facility (if applicable)   Name:  Address:  Dialysis Schedule:  Phone:  Fax:    / signature: Electronically signed by HUNTER Allen on 2/11/21 at 4:57 PM EST    PHYSICIAN SECTION    Prognosis: Good    Condition at Discharge: Stable    Rehab Potential (if transferring to Rehab): Good    Recommended Labs or Other Treatments After Discharge:    Continue vancomycin through 2/28/2021  Check CBC, BMP, CRP and Vancomycin trough on Mondays  BUN, Creatinine, vanco trough on Thursdays  Schedule for follow-up visit in 4 weeks  Fax results to [facility] [address]: [phone]      Physician Certification: I certify the above information and transfer of Jackson Thomas  is necessary for the continuing treatment of the diagnosis listed and that he requires [facility] for greater 30 days.      Update Admission H&P: No change in H&P    PHYSICIAN SIGNATURE:  Electronically signed by Isiah Becker MD on 2/12/21 at 1:59 PM EST

## 2021-02-11 NOTE — PROGRESS NOTES
[facility]  Progress Note    Pierce Salas  MRN: [id number]  Date: 2/11/2021  LOS: 3     Reason for F/U :   MRSA sepsis, osteomyelitis left foot. Impression :   1. Left foot infection/nonhealing surgical wound with cuboid and fourth metatarsal bone exposure/osteomyelitis  · Status post left below-the-knee amputation  2. MRSA bacteremia likely secondary to above infection versus PICC line infection  3. Peripheral arterial disease  4. Diabetes mellitus with associated neuropathy    Recommendations:   · Continue intravenous antimicrobial therapy with cefepime and vancomycin while hospitalized  · The patient will need to complete a 4-week course of vancomycin given the MRSA bacteremia  · We are awaiting evaluation of the left BKA stump and if this is intact the plan is for discharge on vancomycin alone. · The plan is for discharge to an extended care facility potentially tomorrow    Infection Control Recommendations:   Contact precautions    Discharge Planning:   Estimated Length of IV antimicrobials: February 28, 2020  Patient has PICC line in place  Patient will need: SNF  Patient willneed outpatient wound care: Yes    Medical Decision making / Summary of Stay:   Pierce Salas is a 63y.o.-year-old male who was initially admitted on 2/8/2021. Adria Cardenas is a 63y.o.-year-old male presented to hospital with worsening, nonhealing left foot wound at the left transmetatarsal amputation stump with bone exposure, underlying osteomyelitis of the first and fifth metatarsal status post recent incision and drainage for multiple abscesses tracking along the tendon and to the plantar aspect of the calcaneus on 1/5/2021 group B streptococcus and E. coli growth on culture. The patient had PICC line placed and was discharged on IV ceftriaxone that he was receiving at [facility] but did not receive upon discharge home according to the patient.   He also had peripheral arterial disease and Differential:   Recent Labs     02/09/21  0619   WBC 10.2   HGB 8.9*   HCT 30.5*   *     BMP:   Recent Labs     02/09/21  0619      K 4.1      CO2 25   BUN 20   CREATININE 0.99     Hepatic Function Panel:   No results for input(s): PROT, LABALBU, BILIDIR, IBILI, BILITOT, ALKPHOS, ALT, AST in the last 72 hours. No results found for: PROCAL  Lab Results   Component Value Date    .0 02/03/2021    CRP 46.6 01/07/2021    .8 01/02/2021     Lab Results   Component Value Date    SEDRATE 36 (H) 02/03/2021         Lab Results   Component Value Date    DDIMER 0.39 06/29/2020    DDIMER 1.63 12/20/2017    DDIMER 0.68 12/25/2011     Lab Results   Component Value Date    FERRITIN 56 02/09/2021    FERRITIN 64 01/25/2014     No results found for: LDH  No results found for: FIBRINOGEN    Results in Past 30 Days  Result Component Current Result Ref Range Previous Result Ref Range   SARS-CoV-2      (2/4/2021)  Not in Time Range          (2/4/2021)       Not Detected (2/4/2021) Not Detected       Lab Results   Component Value Date    COVID19 Not Detected 02/04/2021    COVID19 Not Detected 12/31/2020    COVID19 Not Detected 11/10/2020    COVID19 Not Detected 08/23/2020       Recent Labs     02/09/21  1117   [address] 19.5     Surgical Pathology Report 02/09/2021  8:38  [address]   -- Diagnosis --     Left lower extremity, below-knee amputation:          Distal necrosis with ulceration, subcutaneous acute inflammation,   and acute osteomyelitis (gangrene). Fibrocalcific atherosclerosis.         Imaging Studies:   No new imaging    Cultures:     FOOT LEFT    Special Requests 02/04/2021 10:14  [address] [facility]   PENDING    Direct Exam Abnormal  02/04/2021 10:14  [address]   RARE NEUTROPHILS    Direct Exam Abnormal  02/04/2021 10:14  [address]   FEW GRAM POSITIVE COCCI IN PAIRS    Direct Exam Abnormal  02/04/2021 10:14 AM [address]   RARE GRAM POSITIVE COCCI IN CLUSTERS    Culture Abnormal  02/04/2021 10:14 AM [address] GRAM NEGATIVE RODS LIGHT GROWTH    Culture Abnormal  02/04/2021 10:14 AM [place]    Culture 02/04/2021 10:14 AM [place] ID: GROUP D ENTEROCOCCUS MODERATE GROWTH    Culture 02/04/2021 10:14  [address]    Culture Abnormal  02/04/2021 10:14  [address]   This is a mixed culture of organisms, which may represent colonization or contamination.  Notify the laboratory within 7 days for further workup.  Susceptibilities have not been performed. Culture Abnormal  02/04/2021 10:14  [address]   NO ANAEROBIC ORGANISMS ISOLATED AT 4 DAYS      BLOOD    Special Requests 02/03/2021  8:40  [facility] [place]    Culture Abnormal  02/03/2021  8:40 PM [address] Blood Culture Results called to and read back by: HA Betancourt. ON 02/07/21 AT 1035    Culture 02/03/2021  8:40 PM [facility] FROM BOTTLE: [address] IN CLUSTERS    Culture Abnormal  02/03/2021  8:40 PM [address] For susceptibility, refer to previous culture.       BLOOD    Special Requests 02/03/2021  8:35  [facility] [address] RT UPPER ARM    Culture Abnormal  02/03/2021  8:35 PM [address] Blood Culture Results called to and read back by: HA White AT 2621 ON 2/4/21    Culture 02/03/2021  8:35 PM [address] FROM BOTTLE: GRAM POSITIVE COCCI IN CLUSTERS    Culture Abnormal  02/03/2021  8:35 PM [address] [facility] [address]    Methicillin resistant staphylococcus aureus (3)    Antibiotic Interpretation DARIAN Status    penicillin Resistant  Final     >=0.5   RESISTANT   cefoxitin screen  NOT REPORTED Final    ciprofloxacin   Final     NOT REPORTED    clindamycin Sensitive  Final     <=0.25   SUSCEPTIBLE   erythromycin Sensitive  Final     <=0.25   SUSCEPTIBLE   gentamicin Sensitive  Final     <=0.5   SUSCEPTIBLE   gentamicin Sensitive Gentamicin is used only in combination with other active agents that test susceptible. Final    Induced Clind Resist  NOT REPORTED Final    levofloxacin Intermediate  Final     4   INTERMEDIATE   linezolid   Final     NOT REPORTED    moxifloxacin   Final     NOT REPORTED    nitrofurantoin   Final     NOT REPORTED    oxacillin Resistant  Final     >=4   RESISTANT   Synercid   Final     NOT REPORTED    rifampin   Final     NOT REPORTED    tetracycline Sensitive  Final     <=1   SUSCEPTIBLE   tigecycline   Final     NOT REPORTED    trimethoprim-sulfamethoxazole Sensitive  Final     <=10   SUSCEPTIBLE   vancomycin Sensitive  Final     1   SUSCEPTIBLE             Medications:      budesonide-formoterol  2 puff Inhalation BID    famotidine  20 mg Oral BID    [Held by provider] insulin glargine  25 Units Subcutaneous BID    lactobacillus  1 tablet Oral BID    sodium chloride flush  10 mL Intravenous 2 times per day    cefepime  2,000 mg Intravenous Q8H    heparin (porcine)  5,000 Units Subcutaneous 3 times per day    vancomycin (VANCOCIN) intermittent dosing (placeholder)   Other RX Placeholder    vancomycin  1,000 mg Intravenous Q12H    insulin lispro  0-12 Units Subcutaneous TID WC    insulin lispro  0-6 Units Subcutaneous Nightly           [facility]  [address]  Perfect Serve messaging  OFFICE: [phone]      Electronically signed by [address], MD on 2/11/2021 at 2:33 PM  Thank you for allowing us to participate in the care of this patient. Please call with questions.     This note iscreated with the assistance of a speech recognition program.  While intending to generate a document that actually reflects the content of the visit, the document can still have some errors including those of syntax andsound a like substitutions which may escape proof reading. In such instances, actual meaning can be extrapolated by contextual diversion.

## 2021-02-11 NOTE — PROGRESS NOTES
Day of Therapy:4  Current Dose:vancomycin 1000mg ivpb q12h   Culture Results-no cultures           Blood:           Sputum:           Wound:           Urine: Other:  Temp Readings from Last 3 Encounters:   02/11/21 98.1 °F (36.7 °C) (Oral)   02/09/21 99.1 °F (37.3 °C)   02/08/21 98.1 °F (36.7 °C) (Oral)     Recent Labs     02/09/21  0619   WBC 10.2     Recent Labs     02/09/21  0619   CREATININE 0.99     Estimated Creatinine Clearance: 76 mL/min (based on SCr of 0.99 mg/dL).     Intake/Output Summary (Last 24 hours) at 2/11/2021 1037  Last data filed at 2/10/2021 2253  Gross per 24 hour   Intake --   Output 300 ml   Net -300 ml

## 2021-02-11 NOTE — ANESTHESIA POSTPROCEDURE EVALUATION
Department of Anesthesiology  Postprocedure Note    Patient: Bora Cano  MRN: [id number]  YOB: 1955  Date of evaluation: 2/11/2021  Time:  7:10 AM     Procedure Summary     Date: 02/09/21 Room / Location: [facility] OR 01 / [place] [address]    Anesthesia Start: 7765 Anesthesia Stop: 1725    Procedure: LEFT  LEG AMPUTATION BELOW KNEE (Left ) Diagnosis: (IN PT)    Surgeons: Shamar Pineda MD Responsible Provider: Jos Renteria MD    Anesthesia Type: general ASA Status: 3          Anesthesia Type: No value filed. Lynne Phase I: Lynne Score: 10    Lynne Phase II:      Last vitals: Reviewed and per EMR flowsheets.        Anesthesia Post Evaluation    Complications: no

## 2021-02-11 NOTE — PROGRESS NOTES
VASCULAR SURGERY  PROGRESS NOTE      2/11/2021 6:29 PM  Subjective:   Admit Date: 2/8/2021  PCP: Peggy Mcgee DO    No chief complaint on file. Interval History: No complaints. Left BKA site looks good. Diet: DIET CARB CONTROL; Carb Control: 4 carbs/meal (approximate 1800 kcals/day); No Added Salt (3-4 GM)  Dietary Nutrition Supplements: Diabetic Oral Supplement    Medications:   Scheduled Meds:   budesonide-formoterol  2 puff Inhalation BID    famotidine  20 mg Oral BID    [Held by provider] insulin glargine  25 Units Subcutaneous BID    lactobacillus  1 tablet Oral BID    sodium chloride flush  10 mL Intravenous 2 times per day    cefepime  2,000 mg Intravenous Q8H    heparin (porcine)  5,000 Units Subcutaneous 3 times per day    vancomycin (VANCOCIN) intermittent dosing (placeholder)   Other RX Placeholder    vancomycin  1,000 mg Intravenous Q12H    insulin lispro  0-12 Units Subcutaneous TID WC    insulin lispro  0-6 Units Subcutaneous Nightly     Continuous Infusions:   dextrose           Labs:   CBC:   Recent Labs     02/09/21 0619   WBC 10.2   HGB 8.9*   *     BMP:    Recent Labs     02/09/21 0619      K 4.1      CO2 25   BUN 20   CREATININE 0.99   GLUCOSE 187*     Hepatic:   No results for input(s): AST, ALT, ALB, BILITOT, ALKPHOS in the last 72 hours. Troponin: Invalid input(s): TROPONIN  BNP: No results for input(s): BNP in the last 72 hours. Lipids: No results for input(s): CHOL, HDL in the last 72 hours.     Invalid input(s): LDLCALCU  INR:   Recent Labs     02/09/21 0619   INR 1.0       Objective:   Vitals: BP (!) 133/57   Pulse 80   Temp 98.3 °F (36.8 °C) (Oral)   Resp 18   Ht 6' 1\" (1.854 m)   Wt 154 lb 1.6 oz (69.9 kg)   SpO2 96%   BMI 20.33 kg/m²   General appearance: alert, cooperative and no distress  Mental Status: oriented to person, place and time with normal affect  Neck: good carotid pulses, no JVD  Lungs: clear to auscultation bilaterally, normal effort  Heart: regular rate and rhythm, no murmur,  Abdomen: soft, non-tender, non-distended, bowel sounds present all four quadrants, no masses, hepatomegaly, splenomegaly or aortic enlargement  Extremities: Right BKA site normal.  Left BKA incision clean and dry  Skin: no gross lesions, rashes, or induration    Assessment:   3 72-year-old male stable status post left below-knee amputation    Patient Active Problem List:     Type 2 diabetes mellitus with diabetic polyneuropathy, with long-term current use of insulin (HCC)     Neuropathic pain, leg     Diabetic polyneuropathy (HCC)     Allergic rhinitis     Tobacco dependence     Edentulous     Dysphagia     Lung nodules     Esophageal cancer (HCC)     Fracture of humerus, left, closed     Closed fracture of humerus     DM type 2 with diabetic peripheral neuropathy (HCC)     Chronic obstructive pulmonary disease (HCC)     HLD (hyperlipidemia)     Gastroesophageal reflux disease     Chronic pain syndrome     Marijuana use     History of colon polyps     Functional diarrhea     Sepsis due to methicillin resistant Staphylococcus aureus (MRSA) (HCC)     History of esophageal cancer     Osteomyelitis, chronic, ankle or foot ([facility])     PAD (peripheral artery disease) ([address]     Other pulmonary embolism without acute cor pulmonale ([facility])     Carotid stenosis, asymptomatic, bilateral     Lower limb amputation status     Fx humeral neck, right, closed, initial encounter     Transient hypotension     Constipation due to opioid therapy     Acute kidney injury ([address]     Diabetic ulcer of left midfoot associated with type 2 diabetes mellitus, with fat layer exposed ([address]     Complication of below knee amputation stump (HCC)     COPD exacerbation (HCC)     Hyponatremia     Pain, phantom limb ([address]     Below-knee amputation of right lower extremity (HCC)     Moderate protein malnutrition ([address]     Essential hypertension     Uncontrolled type 2 diabetes mellitus

## 2021-02-11 NOTE — CARE COORDINATION
[id number] will have bed for patient. Discussed preference of SNF with patient. He would like to know which facilities will allow his dog to visit. Will contact all 3 facilities.  Ceasar Roland

## 2021-02-12 VITALS
DIASTOLIC BLOOD PRESSURE: 60 MMHG | OXYGEN SATURATION: 96 % | WEIGHT: 152.9 LBS | HEART RATE: 70 BPM | HEIGHT: 73 IN | BODY MASS INDEX: 20.26 KG/M2 | TEMPERATURE: 98 F | RESPIRATION RATE: 16 BRPM | SYSTOLIC BLOOD PRESSURE: 155 MMHG

## 2021-02-12 LAB
GLUCOSE BLD-MCNC: 230 MG/DL (ref 75–110)
GLUCOSE BLD-MCNC: 310 MG/DL (ref 75–110)

## 2021-02-12 PROCEDURE — 6360000002 HC RX W HCPCS: Performed by: SURGERY

## 2021-02-12 PROCEDURE — 87040 BLOOD CULTURE FOR BACTERIA: CPT

## 2021-02-12 PROCEDURE — 2580000003 HC RX 258: Performed by: SURGERY

## 2021-02-12 PROCEDURE — 99239 HOSP IP/OBS DSCHRG MGMT >30: CPT | Performed by: FAMILY MEDICINE

## 2021-02-12 PROCEDURE — 36415 COLL VENOUS BLD VENIPUNCTURE: CPT

## 2021-02-12 PROCEDURE — 99232 SBSQ HOSP IP/OBS MODERATE 35: CPT | Performed by: INTERNAL MEDICINE

## 2021-02-12 PROCEDURE — 6360000002 HC RX W HCPCS: Performed by: NURSE PRACTITIONER

## 2021-02-12 PROCEDURE — 6370000000 HC RX 637 (ALT 250 FOR IP): Performed by: SURGERY

## 2021-02-12 PROCEDURE — 82947 ASSAY GLUCOSE BLOOD QUANT: CPT

## 2021-02-12 RX ORDER — OXYCODONE HYDROCHLORIDE AND ACETAMINOPHEN 5; 325 MG/1; MG/1
1 TABLET ORAL EVERY 4 HOURS PRN
Qty: 15 TABLET | Refills: 0 | Status: SHIPPED | OUTPATIENT
Start: 2021-02-12 | End: 2021-02-17

## 2021-02-12 RX ADMIN — VANCOMYCIN HYDROCHLORIDE 1000 MG: 1 INJECTION, POWDER, LYOPHILIZED, FOR SOLUTION INTRAVENOUS at 00:14

## 2021-02-12 RX ADMIN — CEFEPIME HYDROCHLORIDE 2000 MG: 2 INJECTION, POWDER, FOR SOLUTION INTRAVENOUS at 14:01

## 2021-02-12 RX ADMIN — OXYCODONE AND ACETAMINOPHEN 1 TABLET: 5; 325 TABLET ORAL at 09:10

## 2021-02-12 RX ADMIN — INSULIN LISPRO 4 UNITS: 100 INJECTION, SOLUTION INTRAVENOUS; SUBCUTANEOUS at 12:26

## 2021-02-12 RX ADMIN — VANCOMYCIN HYDROCHLORIDE 1000 MG: 1 INJECTION, POWDER, LYOPHILIZED, FOR SOLUTION INTRAVENOUS at 12:26

## 2021-02-12 RX ADMIN — HEPARIN SODIUM 5000 UNITS: 5000 INJECTION INTRAVENOUS; SUBCUTANEOUS at 14:00

## 2021-02-12 RX ADMIN — CEFEPIME HYDROCHLORIDE 2000 MG: 2 INJECTION, POWDER, FOR SOLUTION INTRAVENOUS at 06:23

## 2021-02-12 RX ADMIN — OXYCODONE AND ACETAMINOPHEN 1 TABLET: 5; 325 TABLET ORAL at 02:51

## 2021-02-12 RX ADMIN — HYDROMORPHONE HYDROCHLORIDE 1 MG: 1 INJECTION, SOLUTION INTRAMUSCULAR; INTRAVENOUS; SUBCUTANEOUS at 00:14

## 2021-02-12 RX ADMIN — HYDROMORPHONE HYDROCHLORIDE 1 MG: 1 INJECTION, SOLUTION INTRAMUSCULAR; INTRAVENOUS; SUBCUTANEOUS at 06:23

## 2021-02-12 RX ADMIN — HYDROMORPHONE HYDROCHLORIDE 1 MG: 1 INJECTION, SOLUTION INTRAMUSCULAR; INTRAVENOUS; SUBCUTANEOUS at 15:29

## 2021-02-12 RX ADMIN — ALUMINUM HYDROXIDE, MAGNESIUM HYDROXIDE, AND SIMETHICONE 30 ML: 200; 200; 20 SUSPENSION ORAL at 12:26

## 2021-02-12 RX ADMIN — OXYCODONE AND ACETAMINOPHEN 1 TABLET: 5; 325 TABLET ORAL at 14:01

## 2021-02-12 RX ADMIN — SODIUM CHLORIDE, PRESERVATIVE FREE 10 ML: 5 INJECTION INTRAVENOUS at 09:17

## 2021-02-12 RX ADMIN — PROBIOTIC PRODUCT - TAB 1 TABLET: TAB at 09:10

## 2021-02-12 RX ADMIN — HYDROMORPHONE HYDROCHLORIDE 1 MG: 1 INJECTION, SOLUTION INTRAMUSCULAR; INTRAVENOUS; SUBCUTANEOUS at 11:26

## 2021-02-12 RX ADMIN — INSULIN LISPRO 8 UNITS: 100 INJECTION, SOLUTION INTRAVENOUS; SUBCUTANEOUS at 17:25

## 2021-02-12 RX ADMIN — FAMOTIDINE 20 MG: 20 TABLET, FILM COATED ORAL at 09:10

## 2021-02-12 RX ADMIN — HEPARIN SODIUM 5000 UNITS: 5000 INJECTION INTRAVENOUS; SUBCUTANEOUS at 06:23

## 2021-02-12 RX ADMIN — INSULIN LISPRO 4 UNITS: 100 INJECTION, SOLUTION INTRAVENOUS; SUBCUTANEOUS at 09:02

## 2021-02-12 ASSESSMENT — PAIN DESCRIPTION - ORIENTATION: ORIENTATION: LEFT

## 2021-02-12 ASSESSMENT — PAIN SCALES - GENERAL
PAINLEVEL_OUTOF10: 6
PAINLEVEL_OUTOF10: 7
PAINLEVEL_OUTOF10: 9
PAINLEVEL_OUTOF10: 9
PAINLEVEL_OUTOF10: 7

## 2021-02-12 ASSESSMENT — ENCOUNTER SYMPTOMS
VOMITING: 1
RESPIRATORY NEGATIVE: 1
ALLERGIC/IMMUNOLOGIC NEGATIVE: 1

## 2021-02-12 ASSESSMENT — PAIN DESCRIPTION - PROGRESSION: CLINICAL_PROGRESSION: GRADUALLY IMPROVING

## 2021-02-12 ASSESSMENT — PAIN DESCRIPTION - FREQUENCY
FREQUENCY: CONTINUOUS
FREQUENCY: CONTINUOUS

## 2021-02-12 ASSESSMENT — PAIN DESCRIPTION - PAIN TYPE: TYPE: SURGICAL PAIN

## 2021-02-12 ASSESSMENT — PAIN DESCRIPTION - DESCRIPTORS: DESCRIPTORS: CONSTANT;DISCOMFORT;THROBBING

## 2021-02-12 NOTE — PROGRESS NOTES
[facility] Report called.

## 2021-02-12 NOTE — PLAN OF CARE
Problem: Falls - Risk of:  Goal: Will remain free from falls  Description: Will remain free from falls  2/11/2021 0020 by Asif Reed RN  Outcome: Ongoing  2/10/2021 1610 by Za Barth RN  Outcome: Ongoing  Goal: Absence of physical injury  Description: Absence of physical injury  2/11/2021 0020 by Asif Reed RN  Outcome: Ongoing  2/10/2021 1610 by Za Barth RN  Outcome: Ongoing     Problem: SAFETY  Goal: Free from accidental physical injury  2/11/2021 0020 by Asif Reed RN  Outcome: Ongoing  2/10/2021 1610 by Za Barth RN  Outcome: Ongoing     Problem: DAILY CARE  Goal: Daily care needs are met  2/11/2021 0020 by Asif Reed RN  Outcome: Ongoing  2/10/2021 1610 by Za Barth RN  Outcome: Ongoing     Problem: PAIN  Goal: Patient's pain/discomfort is manageable  2/11/2021 0020 by Asif Reed RN  Outcome: Ongoing  2/10/2021 1610 by Za Barth RN  Outcome: Ongoing     Problem: SKIN INTEGRITY  Goal: Skin integrity is maintained or improved  2/11/2021 0020 by Asif Reed RN  Outcome: Ongoing  2/10/2021 1610 by Za Barth RN  Outcome: Ongoing     Problem: KNOWLEDGE DEFICIT  Goal: Patient/S.O. demonstrates understanding of disease process, treatment plan, medications, and discharge instructions.   2/11/2021 0020 by Asif Reed RN  Outcome: Ongoing  2/10/2021 1610 by Za Barth RN  Outcome: Ongoing     Problem: DISCHARGE BARRIERS  Goal: Patient's continuum of care needs are met  2/11/2021 0020 by Asif Reed RN  Outcome: Ongoing  2/10/2021 1610 by Za Barth RN  Outcome: Ongoing     Problem: Nutrition  Goal: Optimal nutrition therapy  Outcome: Ongoing
Problem: Falls - Risk of:  Goal: Will remain free from falls  Description: Will remain free from falls  Outcome: Ongoing  Goal: Absence of physical injury  Description: Absence of physical injury  Outcome: Ongoing     Problem: SAFETY  Goal: Free from accidental physical injury  Outcome: Ongoing     Problem: DAILY CARE  Goal: Daily care needs are met  Outcome: Ongoing     Problem: PAIN  Goal: Patient's pain/discomfort is manageable  Outcome: Ongoing     Problem: SKIN INTEGRITY  Goal: Skin integrity is maintained or improved  Outcome: Ongoing     Problem: KNOWLEDGE DEFICIT  Goal: Patient/S.O. demonstrates understanding of disease process, treatment plan, medications, and discharge instructions.   Outcome: Ongoing     Problem: DISCHARGE BARRIERS  Goal: Patient's continuum of care needs are met  Outcome: Ongoing
Problem: Falls - Risk of:  Goal: Will remain free from falls  Description: Will remain free from falls  Outcome: Ongoing  Goal: Absence of physical injury  Description: Absence of physical injury  Outcome: Ongoing     Problem: SAFETY  Goal: Free from accidental physical injury  Outcome: Ongoing     Problem: DAILY CARE  Goal: Daily care needs are met  Outcome: Ongoing     Problem: PAIN  Goal: Patient's pain/discomfort is manageable  Outcome: Ongoing     Problem: SKIN INTEGRITY  Goal: Skin integrity is maintained or improved  Outcome: Ongoing     Problem: KNOWLEDGE DEFICIT  Goal: Patient/S.O. demonstrates understanding of disease process, treatment plan, medications, and discharge instructions.   Outcome: Ongoing     Problem: DISCHARGE BARRIERS  Goal: Patient's continuum of care needs are met  Outcome: Ongoing
Problem: Falls - Risk of:  Goal: Will remain free from falls  Outcome: Ongoing     Problem: DAILY CARE  Goal: Daily care needs are met  Outcome: Ongoing     Problem: PAIN  Goal: Patient's pain/discomfort is manageable  Outcome: Ongoing     Problem: SKIN INTEGRITY  Goal: Skin integrity is maintained or improved  Outcome: Ongoing     Problem: KNOWLEDGE DEFICIT  Goal: Patient/S.O. demonstrates understanding of disease process, treatment plan, medications, and discharge instructions.   Outcome: Ongoing     Problem: DISCHARGE BARRIERS  Goal: Patient's continuum of care needs are met  Outcome: Ongoing
Problem: PAIN  Goal: Patient's pain/discomfort is manageable  2/9/2021 0938 by Merlyn Snell RN  Outcome: Ongoing  Note: Pt reports adequate pain relief with current meds  2/9/2021 0446 by Anson Ontiveros RN  Outcome: Ongoing     Problem: Falls - Risk of:  Goal: Will remain free from falls  Description: Will remain free from falls  2/9/2021 0938 by Merlyn Snell RN  Outcome: Ongoing  Note: Fall safety precautions maintained, alarm on  2/9/2021 0446 by Anson Ontiveros RN  Outcome: Ongoing
skin breakdown  Description: Absence of new skin breakdown  Outcome: Ongoing

## 2021-02-12 NOTE — DISCHARGE SUMMARY
[facility]  Office: [address], DO, Amish Somerset, DO, Tina Yaya, DO, Netty Sacks Blood, DO, Bola Fajardo MD, Mayte Collins MD, Faheem Cartagena MD, Myrna Rajput MD, Becki Chambers MD, Marguerite Lara MD, Sharan Felty, MD, Kylah Gleason MD, Farzaneh Quiros MD, Lencho Cochran, DO, Ortega Canela MD, Leanna Restrepo MD, Scout De La Cruz, DO, Cesar Driscoll MD,  Jerry Pringle, DO, Ada Masterson MD, Garth Mejía MD, Clifton Santos, [facility], [facility], CNP, Janes Huitron, CNP, Ching Adkins, CNS, Semaj Garcia, CNP, Tylor Shore, CNP, Aebba Osborne, CNP, Joshua Ding, CNP, Juan Braden, CNP, Verito Smith PA-C, Laura Yeager, [facility], Stevo Cabral, CNP, Ale Chou, CNP, Pipe Panchal, CNP, Mala Rios, CNP, Shelly Driver, [facility]    Discharge Summary     Patient ID: Mar Curling  :  1955   MRN: [id number]     ACCOUNT:  [de-identified]   Patient's PCP: Albina Lizarraga DO  Admit Date: 2021   Discharge Date: 2021      Length of Stay: 4  Code Status:  Full Code  Admitting Physician: Caryn Chung DO  Discharge Physician: Ada Masterson MD     Active Discharge Diagnoses:     Hospital Problem Lists:  Active Problems:    Tobacco dependence    Chronic obstructive pulmonary disease ([address]    Below-knee amputation of right lower extremity ([address]    Essential hypertension    Uncontrolled type 2 diabetes mellitus with foot ulcer ([address]    Anemia, normocytic normochromic    Acute osteomyelitis of left foot ([address]    Diabetic ulcer of left midfoot associated with type 2 diabetes mellitus, with necrosis of bone (HCC)    Pyogenic inflammation of bone ([address]  Resolved Problems:    * No resolved hospital problems.  *      Admission Condition:  stable     Discharged Condition: stable    Hospital Stay:     Hospital Course:  Mar Curling is a 63 y.o. male who was admitted for the management of <principal problem not specified> , presented to ER with   No chief complaint on file. Jorge L Cardenas is a 63 y.o.  gentleman who presented to [facility] with a worsening, nonhealing left foot wound.  Patient has a history of underlying osteomyelitis and peripheral vascular disease.  He is status post right BKA, but has been avoiding left BKA if possible. He had an incision and drainage of his left foot on 1/5 which revealed group B strep and E. coli for which he was supposed to be receiving Rocephin in the outpatient setting for a his ECF. However, the patient states that he never received this antibiotic while he was at his [address] does have a right upper extremity PICC line which was placed.     Patient was sent to [facility] from his podiatrist office secondary to inability to debride an active infection of his left foot. Be Edge was started on IV antibiotics at [facility] and was admitted to the hospital. Be Edge was evaluated by vascular surgery, who is recommending the patient undergo amputation. Be Edge was subsequently transferred to [facility] for this purpose    ID   1. Left foot infection/nonhealing surgical wound with cuboid and fourth metatarsal bone exposure/osteomyelitis  · Status post left below-the-knee amputation 2/9/2021  2. MRSA bacteremia likely secondary to above infection versus PICC line infection  3. Peripheral arterial disease  4. Diabetes mellitus with associated neuropathy  Recommendations:   · The patient had a  placed in the left lower extremity. · I will discontinue the cefepime at this time  · Continue intravenous vancomycin to complete a 4-week course for the MRSA bacteremia through 2/28/2020  · I will check blood cultures x2 sets today to document clearance.   · The plan is for discharge to an extended care facility once insurance approves  · The patient is okay for discharge from an infectious disease standpoint    Per vascular surgery   Plan:   1. [id number] [address] for discharge from a vascular standpoint  2.  Follow-up in the office in 4 weeks      Significant therapeutic interventions:      Significant Diagnostic Studies:   Labs / Micro:  CBC:   Lab Results   Component Value Date    WBC 10.2 02/09/2021    RBC 3.50 02/09/2021    RBC 4.32 05/02/2012    HGB 8.9 02/09/2021    HCT 30.5 02/09/2021    MCV 87.1 02/09/2021    MCH 25.4 02/09/2021    MCHC 29.2 02/09/2021    RDW 15.2 02/09/2021     02/09/2021     05/02/2012     BMP:    Lab Results   Component Value Date    GLUCOSE 187 02/09/2021    GLUCOSE 129 05/02/2012     02/09/2021    K 4.1 02/09/2021     02/09/2021    CO2 25 02/09/2021    ANIONGAP 10 02/09/2021    BUN 20 02/09/2021    CREATININE 0.99 02/09/2021    BUNCRER 20 02/09/2021    CALCIUM 9.1 02/09/2021    LABGLOM >60 02/09/2021    GFRAA >60 02/09/2021    GFR      02/09/2021    GFR NOT REPORTED 02/09/2021     HFP:    Lab Results   Component Value Date    PROT 6.1 02/08/2021     CMP:    Lab Results   Component Value Date    GLUCOSE 187 02/09/2021    GLUCOSE 129 05/02/2012     02/09/2021    K 4.1 02/09/2021     02/09/2021    CO2 25 02/09/2021    BUN 20 02/09/2021    CREATININE 0.99 02/09/2021    ANIONGAP 10 02/09/2021    ALKPHOS 83 02/08/2021    ALT 5 02/08/2021    AST 5 02/08/2021    BILITOT <0.15 02/08/2021    LABALBU 2.5 02/08/2021    LABALBU 4.4 03/05/2012    ALBUMIN NOT REPORTED 02/08/2021    LABGLOM >60 02/09/2021    GFRAA >60 02/09/2021    GFR      02/09/2021    GFR NOT REPORTED 02/09/2021    PROT 6.1 02/08/2021    CALCIUM 9.1 02/09/2021     PT/INR:    Lab Results   Component Value Date    PROTIME 12.9 02/09/2021    PROTIME 10.5 05/02/2012    INR 1.0 02/09/2021     PTT:   Lab Results   Component Value Date    APTT 34.5 02/09/2021     FLP:    Lab Results   Component Value Date    CHOL 174 06/24/2018    TRIG 175 06/24/2018    HDL 49 06/24/2018     U/A:    Lab Results   Component Value Date    COLORU YELLOW 07/09/2018    TURBIDITY CLEAR 07/09/2018    SPECGRAV 1.010 07/09/2018    HGBUR NEGATIVE 07/09/2018    PHUR 5.0 07/09/2018    PROTEINU 1+ 07/09/2018    GLUCOSEU NEGATIVE 07/09/2018    GLUCOSEU TRACE 03/05/2012    KETUA NEGATIVE 07/09/2018    BILIRUBINUR NEGATIVE 07/09/2018    BILIRUBINUR small 07/18/2016    BILIRUBINUR NEGATIVE 03/05/2012    UROBILINOGEN Normal 07/09/2018    NITRU NEGATIVE 07/09/2018    LEUKOCYTESUR NEGATIVE 07/09/2018     TSH:    Lab Results   Component Value Date    TSH 0.93 06/24/2018         Radiology:  No results found. Consultations:    Consults:     Final Specialist Recommendations/Findings:   IP CONSULT TO PHARMACY  IP CONSULT TO VASCULAR SURGERY  IP CONSULT TO INFECTIOUS DISEASES  IP CONSULT TO PHYSICAL MEDICINE REHAB      The patient was seen and examined on day of discharge and this discharge summary is in conjunction with any daily progress note from day of discharge.     Discharge plan:     Disposition: [facility]    Physician Follow Up:    FOLLOW UP WITH DR Ines Prajapati IN 4 WEEKS  FOLLOW UP WITH DR Walt MUSA. [address] UP WITH PCP        Requiring Further Evaluation/Follow Up POST HOSPITALIZATION/Incidental Findings:      Diet: diabetic diet    Activity: As tolerated     Instructions to Patient:      Discharge Medications:      Medication List      CHANGE how you take these medications    insulin glargine 100 UNIT/ML injection vial  Commonly known as: LANTUS  Inject 25 Units into the skin Daily with supper  What changed: when to take this        CONTINUE taking these medications    apixaban 5 MG Tabs tablet  Commonly known as: Eliquis  Take 1 tablet by mouth 2 times daily     budesonide-formoterol 160-4.5 MCG/ACT Aero  Commonly known as: SYMBICORT  Inhale 2 puffs into the lungs 2 times daily     cefepime  infusion  Commonly known as: MAXIPIME  Infuse 1,000 mg intravenously every 12 hours for 28 days Compound per protocol     famotidine 20 MG tablet  Commonly known as: PEPCID     * insulin lispro 100 UNIT/ML injection vial  Commonly known as: HUMALOG  Inject 0-18 Units into the skin 3 times daily (with meals)     * insulin lispro 100 UNIT/ML injection vial  Commonly known as: HUMALOG  Inject 0-9 Units into the skin nightly     ipratropium-albuterol 0.5-2.5 (3) MG/3ML Soln nebulizer solution  Commonly known as: DUONEB  Inhale 3 mLs into the lungs every 4 hours as needed for Shortness of Breath     lactobacillus Tabs  Take 1 tablet by mouth 2 times daily     metFORMIN 500 MG tablet  Commonly known as: GLUCOPHAGE  Take 1 tablet by mouth 2 times daily (with meals)     ondansetron 4 MG disintegrating tablet  Commonly known as: Zofran ODT  Take 1 tablet by mouth every 8 hours as needed for Nausea     oxyCODONE-acetaminophen 5-325 MG per tablet  Commonly known as: PERCOCET     vancomycin  infusion  Commonly known as: VANCOCIN  Infuse 750 mg intravenously every 12 hours for 28 days Compound per protocol. Ventolin  (90 Base) MCG/ACT inhaler  Generic drug: albuterol sulfate HFA         * This list has 2 medication(s) that are the same as other medications prescribed for you. Read the directions carefully, and ask your doctor or other care provider to review them with you. STOP taking these medications    ceftaroline fosamil 600 MG Solr  Commonly known as: TEFLARO            No discharge procedures on file. Time Spent on discharge is  39 mins in patient examination, evaluation, counseling as well as medication reconciliation, prescriptions for required medications, discharge plan and follow up. Electronically signed by   Sohail Harris MD  2/12/2021  1:59 PM      Thank you Dr. Cassia Robles DO for the opportunity to be involved in this patient's care.

## 2021-02-12 NOTE — PROGRESS NOTES
VASCULAR SURGERY  PROGRESS NOTE      2/12/2021 1:52 PM  Subjective:   Admit Date: 2/8/2021  PCP: Melita Beasley DO    No chief complaint on file. Interval History: No complaints. Stump  fitted. Diet: DIET CARB CONTROL; Carb Control: 4 carbs/meal (approximate 1800 kcals/day); No Added Salt (3-4 GM)  Dietary Nutrition Supplements: Diabetic Oral Supplement    Medications:   Scheduled Meds:   budesonide-formoterol  2 puff Inhalation BID    famotidine  20 mg Oral BID    [Held by provider] insulin glargine  25 Units Subcutaneous BID    lactobacillus  1 tablet Oral BID    sodium chloride flush  10 mL Intravenous 2 times per day    cefepime  2,000 mg Intravenous Q8H    heparin (porcine)  5,000 Units Subcutaneous 3 times per day    vancomycin (VANCOCIN) intermittent dosing (placeholder)   Other RX Placeholder    vancomycin  1,000 mg Intravenous Q12H    insulin lispro  0-12 Units Subcutaneous TID WC    insulin lispro  0-6 Units Subcutaneous Nightly     Continuous Infusions:   dextrose           Labs:   CBC:   No results for input(s): WBC, HGB, PLT in the last 72 hours. BMP:    No results for input(s): NA, K, CL, CO2, BUN, CREATININE, GLUCOSE in the last 72 hours. Hepatic:   No results for input(s): AST, ALT, ALB, BILITOT, ALKPHOS in the last 72 hours. Troponin: Invalid input(s): TROPONIN  BNP: No results for input(s): BNP in the last 72 hours. Lipids: No results for input(s): CHOL, HDL in the last 72 hours. Invalid input(s): LDLCALCU  INR:   No results for input(s): INR in the last 72 hours.     Objective:   Vitals: BP (!) 155/60   Pulse 70   Temp 98 °F (36.7 °C) (Oral)   Resp 16   Ht 6' 1\" (1.854 m)   Wt 152 lb 14.4 oz (69.4 kg)   SpO2 96%   BMI 20.17 kg/m²   General appearance: alert, cooperative and no distress  Mental Status: oriented to person, place and time with normal affect  Neck: good carotid pulses, no JVD  Lungs: clear to auscultation bilaterally, normal effort  Heart: regular rate and rhythm, no murmur,  Abdomen: soft, non-tender, non-distended, bowel sounds present all four quadrants, no masses, hepatomegaly, splenomegaly or aortic enlargement  Extremities: Right BKA site normal.  Left BKA incision clean and dry  Skin: no gross lesions, rashes, or induration    Assessment:   3 79-year-old male stable status post left below-knee amputation    Patient Active Problem List:     Type 2 diabetes mellitus with diabetic polyneuropathy, with long-term current use of insulin (HCC)     Neuropathic pain, leg     Diabetic polyneuropathy (HCC)     Allergic rhinitis     Tobacco dependence     Edentulous     Dysphagia     Lung nodules     Esophageal cancer (HCC)     Fracture of humerus, left, closed     Closed fracture of humerus     DM type 2 with diabetic peripheral neuropathy (HCC)     Chronic obstructive pulmonary disease (HCC)     HLD (hyperlipidemia)     Gastroesophageal reflux disease     Chronic pain syndrome     Marijuana use     History of colon polyps     Functional diarrhea     Sepsis due to methicillin resistant Staphylococcus aureus (MRSA) (HCC)     History of esophageal cancer     Osteomyelitis, chronic, ankle or foot (HCC)     PAD (peripheral artery disease) ([address]     Other pulmonary embolism without acute cor pulmonale ([facility])     Carotid stenosis, asymptomatic, bilateral     Lower limb amputation status     Fx humeral neck, right, closed, initial encounter     Transient hypotension     Constipation due to opioid therapy     Acute kidney injury ([address]     Diabetic ulcer of left midfoot associated with type 2 diabetes mellitus, with fat layer exposed ([address]     Complication of below knee amputation stump (HCC)     COPD exacerbation (HCC)     Hyponatremia     Pain, phantom limb ([address]     Below-knee amputation of right lower extremity (HCC)     Moderate protein malnutrition ([address]     Essential hypertension     Uncontrolled type 2 diabetes mellitus with hyperglycemia [facility])     History of pulmonary embolism - 2017     Atelectasis     Uncontrolled type 2 diabetes mellitus with foot ulcer (HCC)     Azotemia     Anemia, normocytic normochromic     Drug-induced constipation     Osteomyelitis of metatarsals of left foot (HCC)     Diabetic foot infection ([address]     Noncompliance     Type 1 diabetes mellitus with diabetic foot infection ([address]     Acute osteomyelitis of left foot (HCC)     Cellulitis of left foot     Mild malnutrition (HCC)     Diabetic infection of left foot (HCC)     Hypocalcemia     Hypokalemia     Moderate malnutrition (HCC)     Diabetic ulcer of left midfoot associated with type 2 diabetes mellitus, with necrosis of bone ([address]     History of below knee amputation, right ([address]     Foot ulcer with fat layer exposed, left ([address]     Chronic refractory osteomyelitis of left foot (HCC)     Sepsis ([address]     Pyogenic inflammation of bone ([address]      Plan:   1. [id number] [address] for discharge from a vascular standpoint  2.  Follow-up in the office in 4 weeks    Electronically signed by Jinny Vera MD on 2/12/2021 at 1:52 PM

## 2021-02-12 NOTE — PROGRESS NOTES
[facility]  Progress Note    Bora Cano  MRN: [id number]  Date: 2/12/2021  LOS: 4     Reason for F/U :   MRSA sepsis, osteomyelitis left foot. Impression :   1. Left foot infection/nonhealing surgical wound with cuboid and fourth metatarsal bone exposure/osteomyelitis  · Status post left below-the-knee amputation 2/9/2021  2. MRSA bacteremia likely secondary to above infection versus PICC line infection  3. Peripheral arterial disease  4. Diabetes mellitus with associated neuropathy    Recommendations:   · The patient had a  placed in the left lower extremity. · I will discontinue the cefepime at this time  · Continue intravenous vancomycin to complete a 4-week course for the MRSA bacteremia through 2/28/2020  · I will check blood cultures x2 sets today to document clearance. · The plan is for discharge to an extended care facility once insurance approves  · The patient is okay for discharge from an infectious disease standpoint    Infection Control Recommendations:   Contact precautions    Discharge Planning:   Estimated Length of IV antimicrobials: February 28, 2020  Patient has PICC line in place  Patient will need: SNF  Patient willneed outpatient wound care: Yes    Medical Decision making / Summary of Stay:   Bora Cano is a 63y.o.-year-old male who was initially admitted on 2/8/2021. Jorge L Tremaine Cardenas is a 63y.o.-year-old male presented to hospital with worsening, nonhealing left foot wound at the left transmetatarsal amputation stump with bone exposure, underlying osteomyelitis of the first and fifth metatarsal status post recent incision and drainage for multiple abscesses tracking along the tendon and to the plantar aspect of the calcaneus on 1/5/2021 group B streptococcus and E. coli growth on culture.   The patient had PICC line placed and was discharged on IV ceftriaxone that he was receiving at [facility] but did not receive upon discharge home according to the patient. He also had peripheral arterial disease and diabetes mellitus. BKA was recommended but patient declined and wanted to try IV antibiotics and hyperbaric oxygen treatment. The patient had PICC line and was discharged    Current evaluation:2021    /63   Pulse 88   Temp 97.7 °F (36.5 °C) (Oral)   Resp 16   Ht 6' 1\" (1.854 m)   Wt 152 lb 14.4 oz (69.4 kg)   SpO2 96%   BMI 20.17 kg/m²     Temperature Range: Temp: 97.7 °F (36.5 °C) Temp  Av.1 °F (36.7 °C)  Min: 97.7 °F (36.5 °C)  Max: 99 °F (37.2 °C)  The patient is seen and evaluated at bedside and is awake and alert in no acute distress. No subjective fever or chills. No abdominal pain nausea and the patient did have some vomiting earlier this morning. No diarrhea or constipation. Review of Systems   Constitutional: Negative. Respiratory: Negative. Cardiovascular: Negative. Gastrointestinal: Positive for vomiting. Genitourinary: Negative. Musculoskeletal: Negative. Allergic/Immunologic: Negative. Neurological: Negative. Physical Examination :     Physical Exam  Constitutional:       Appearance: He is well-developed. HENT:      Head: Normocephalic and atraumatic. Neck:      Musculoskeletal: Neck supple. Cardiovascular:      Rate and Rhythm: Normal rate. Heart sounds: Normal heart sounds. No friction rub. No gallop. Pulmonary:      Effort: Pulmonary effort is normal.      Breath sounds: Normal breath sounds. No wheezing. Abdominal:      General: Bowel sounds are normal.      Palpations: Abdomen is soft. There is no mass. Tenderness: There is no abdominal tenderness. Musculoskeletal:      Comments: Status post a left below the knee amputation  History of right below-the-knee amputation. Lymphadenopathy:      Cervical: No cervical adenopathy. Skin:     General: Skin is warm and dry.    Neurological:      Mental Status: He is alert and oriented to person, place, and time.         Laboratory data:   I have independently reviewed the followinglabs:  CBC with Differential:   No results for input(s): WBC, HGB, HCT, PLT, SEGSPCT, BANDSPCT, LYMPHOPCT, MONOPCT, EOSPCT in the last 72 hours. BMP:   No results for input(s): NA, K, CL, CO2, BUN, CREATININE, MG in the last 72 hours. Invalid input(s): CA  Hepatic Function Panel:   No results for input(s): PROT, LABALBU, BILIDIR, IBILI, BILITOT, ALKPHOS, ALT, AST in the last 72 hours. No results found for: PROCAL  Lab Results   Component Value Date    .0 02/03/2021    CRP 46.6 01/07/2021    .8 01/02/2021     Lab Results   Component Value Date    SEDRATE 36 (H) 02/03/2021         Lab Results   Component Value Date    DDIMER 0.39 06/29/2020    DDIMER 1.63 12/20/2017    DDIMER 0.68 12/25/2011     Lab Results   Component Value Date    FERRITIN 56 02/09/2021    FERRITIN 64 01/25/2014     No results found for: LDH  No results found for: FIBRINOGEN    Results in Past 30 Days  Result Component Current Result Ref Range Previous Result Ref Range   SARS-CoV-2      (2/4/2021)  Not in Time Range          (2/4/2021)       Not Detected (2/4/2021) Not Detected       Lab Results   Component Value Date    COVID19 Not Detected 02/04/2021    COVID19 Not Detected 12/31/2020    COVID19 Not Detected 11/10/2020    COVID19 Not Detected 08/23/2020       Recent Labs     02/09/21  1117   [address] 19.5     Surgical Pathology Report 02/09/2021  8:38  [address]   -- Diagnosis --     Left lower extremity, below-knee amputation:          Distal necrosis with ulceration, subcutaneous acute inflammation,   and acute osteomyelitis (gangrene). Fibrocalcific atherosclerosis.         Imaging Studies:   No new imaging    Cultures:     FOOT LEFT    Special Requests 02/04/2021 10:14  [address] [facility]   PENDING    Direct Exam Abnormal  02/04/2021 10:14 AM [address]    Direct Exam 2/12/2021 at 10:54 AM  Thank you for allowing us to participate in the care of this patient. Please call with questions. This note iscreated with the assistance of a speech recognition program.  While intending to generate a document that actually reflects the content of the visit, the document can still have some errors including those of syntax andsound a like substitutions which may escape proof reading. In such instances, actual meaning can be extrapolated by contextual diversion.

## 2021-02-12 NOTE — PROGRESS NOTES
Occupational Therapy    DATE: 2021    NAME: Yasmin Locke  MRN: [id number]   : 1955      [facility]  Occupational Therapy Not Seen Note    Patient not available for Occupational Therapy due to:    [] Testing:    [] Hemodialysis    [] Cancelled by RN:    [x]Refusal by Patient: at 10:55 due nausea and vomiting. Pt stated \"Are you kidding me, I'm sick! \" and \"I already had therapy when he put this on! \" Referring to his . [] Surgery:     [] Intubation:     [] Pain Medication:    [] Sedation:     [] Spine Precautions :    [] Medical Instability:    [x] Other:  Pt with prosthetist getting stump  at 8:28.     ARABELLA Lewis

## 2021-02-12 NOTE — PROGRESS NOTES
Physical Therapy  DATE: 2021    NAME: Morelia Wen  MRN: [id number]   : 1955    Patient not seen this date for Physical Therapy due to:  [] Blood transfusion in progress  [] Cancel by RN  [] Hemodialysis  []  Refusal by Patient   [] Spine Precautions   [] Strict Bedrest  [] Surgery  [] Testing      [x] Other (1st attempt to see patient, staff from [place] [id number] arrived to fit patient's  at same time.)       [] PT being discontinued at this time. Patient independent. No further needs. [] PT being discontinued at this time as the patient has been transferred to hospice care. No further needs.     Bladimir Ontiveros, PTA

## 2021-02-15 LAB — GLUCOSE BLD-MCNC: 236 MG/DL (ref 75–110)

## 2021-02-18 ENCOUNTER — TELEPHONE (OUTPATIENT)
Dept: PHARMACY | Age: 66
End: 2021-02-18

## 2021-02-18 LAB
CULTURE: NORMAL
CULTURE: NORMAL
Lab: NORMAL
Lab: NORMAL
SPECIMEN DESCRIPTION: NORMAL
SPECIMEN DESCRIPTION: NORMAL

## 2021-02-19 NOTE — DISCHARGE SUMMARY
[facility]    Discharge Summary     Patient ID: Samara Ernst  :  1955   MRN: [id number]     ACCOUNT:  [de-identified]   Patient's PCP: Dennie Sobers, DO  Admit Date: 2/3/2021   Discharge Date: 2021    Length of Stay: 5  Code Status:  Prior  Admitting Physician: Huma Brannon MD  Discharge Physician: Huma Brannon MD     Active Discharge Diagnoses:     Primary Problem  Sepsis [facility])      [place] Problems    Diagnosis Date Noted    Sepsis ([address] [A41.9] 2021    Acute osteomyelitis of left foot ([address] [M86.172] 2020    Noncompliance [Z91.19] 2020    Uncontrolled type 2 diabetes mellitus with hyperglycemia ([address] [E11.65] 2020    Tobacco dependence [F17.200]        Admission Condition:  fair     Discharged Condition: fair    Hospital Stay:     Hospital Course:  Samara Ernst is a 63 y.o. male who was admitted for the management of Sepsis ([address] , presented to ER with Wound Check  59-year-old gentleman with a history of uncontrolled diabetes mellitus noncompliance active smoker history of severe peripheral vascular disease with right below-knee amputation and complicated diabetic foot infection in the left with osteomyelitis  Patient has been seen by vascular surgeon in the past and was recommended to have below-knee amputation  Patient was treated at [facility] on  and discharged to [facility] with IV antibiotics patient claims that he never got the antibiotics and readmitted with the worsening foot infection  Patient was treated with IV antibiotics and consultation with infectious disease vascular surgery was consulted transferred to [facility] again as concerned surgeon's primary hospitalist sent in        Significant therapeutic interventions:     Significant Diagnostic Studies:   Labs / Micro:        ,     Radiology:    Xr Foot Left (min 3 Views)    Result Date: 2/3/2021  EXAMINATION: THREE XRAY VIEWS OF THE LEFT FOOT 2/3/2021 8:56 pm COMPARISON: December 31, 2020 HISTORY: ORDERING SYSTEM PROVIDED HISTORY: foot infection evaluation for gas TECHNOLOGIST PROVIDED HISTORY: foot infection evaluation for gas Reason for Exam: foot infection, evaluation for gas Acuity: Acute Type of Exam: Initial FINDINGS: Interval resection of the 5th metatarsal.  Previous resections again noted involving the 1st through 4th digit distal to the mid shaft of the metatarsals. Large ulceration lateral aspect of the foot is well as soft tissue swelling distally. Lucencies noted in the soft tissues distally concerning for gas. Ulceration dorsal aspect of the foot. No discrete bony erosion. Postsurgical changes as above. Large soft tissue ulceration lateral aspect of the foot is well as soft tissue swelling distally and ulceration along the dorsal foot. Lucencies in the soft tissues concerning for gas. While there is no discrete bony erosion, given proximity of soft tissue changes underlying osteomyelitis is of concern. Mri Foot Left W Wo Contrast    Result Date: 2/4/2021  EXAMINATION: MRI OF THE LEFT FOOT WITH AND WITHOUT CONTRAST, 2/4/2021 2:25 pm TECHNIQUE: Multiplanar multisequence MRI of the left foot was performed with and without the administration of intravenous contrast. COMPARISON: Left foot radiograph February 3, 2021; MRI left foot January 4, 2020 HISTORY: ORDERING SYSTEM PROVIDED HISTORY: r/o deep abscess/ assess extent OM TECHNOLOGIST PROVIDED HISTORY: r/o deep abscess/ assess extent OM Reason for Exam: r/o deep abscess/ assess extent OM Acuity: Acute Type of Exam: Unknown Additional signs and symptoms: PT C/O INFECTION LATERAL SIDE OF LEFT FOOT X 3 MONTHS Relevant Medical/Surgical History: HX TOES AMPUTATED FINDINGS: LISFRANC JOINT:  Lisfranc ligament is visualized and is normal.  The alignment of the tarsal-metatarsal joint is anatomic.  BONE MARROW: Pronounced marrow edema of the residual 4th metatarsal base and within the cuboid with associated confluent decreased T1 signal and prominent postcontrast enhancement. Findings consistent with osteomyelitis. The 5th metatarsal base has been resected. Edema and patchy postcontrast enhancement of the medial, intermediate, and lateral cuneiforms and bases of the 2nd and 3rd metatarsals with grossly preserved T1 signal at the sites. Findings may reflect reactive noninfectious osteitis, however, very early changes of osteomyelitis difficult to entirely exclude. JOINTS: Mild midfoot osteoarthritis. No joint effusion. SOFT TISSUES: Soft tissue wound distally and laterally. Subcutaneous edema and postcontrast enhancement the soft tissues consistent with cellulitis. No organized drainable fluid collection identified. TENDONS: Postoperative changes of the distal flexor and extensor tendons. No tenosynovitis identified. 1. Lateral soft tissue wound distally with underlying osteomyelitis of the residual 4th metatarsal and cuboid. No organized drainable fluid collection identified. Subcutaneous edema and mild postcontrast enhancement the soft tissues suggestive of cellulitis. 2. Patchy edema and associated patchy postcontrast enhancement involving the medial, intermediate, and lateral cuneiforms and bases of the 2nd and 3rd metatarsals. T1 marrow signal is grossly preserved findings may reflect reactive noninfectious osteitis, however, early changes of osteomyelitis difficult to entirely exclude. Consultations:    Consults:     Final Specialist Recommendations/Findings:   IP CONSULT TO PODIATRY  IP CONSULT TO VASCULAR SURGERY  PHARMACY TO DOSE VANCOMYCIN  IP CONSULT TO INFECTIOUS DISEASES  IP CONSULT TO INFECTIOUS DISEASES  IP CONSULT TO VASCULAR SURGERY      The patient was seen and examined on day of discharge and this discharge summary is in conjunction with any daily progress note from day of discharge.     Discharge plan:     Disposition: [facility]    Physician Follow Up:     [address] [facility],[address]  [address] [facility]  [address] [address]  In 1 week  dr Sherry Hill - following [address] [facility]  [address] [facility]  [phone]           Requiring Further Evaluation/Follow Up POST HOSPITALIZATION/Incidental Findings:    Diet: cardiac diet    Activity: As tolerated    Instructions to Patient:     Discharge Medications:      Medication List      ASK your doctor about these medications    apixaban 5 MG Tabs tablet  Commonly known as: Eliquis  Take 1 tablet by mouth 2 times daily     budesonide-formoterol 160-4.5 MCG/ACT Aero  Commonly known as: SYMBICORT  Inhale 2 puffs into the lungs 2 times daily     cefepime  infusion  Commonly known as: MAXIPIME  Infuse 1,000 mg intravenously every 12 hours for 28 days Compound per protocol     ceftaroline fosamil 600 MG Solr  Commonly known as: TEFLARO  Infuse 600 mg intravenously every 12 hours for 28 days     famotidine 20 MG tablet  Commonly known as: PEPCID     insulin glargine 100 UNIT/ML injection vial  Commonly known as: LANTUS  Inject 25 Units into the skin Daily with supper     * insulin lispro 100 UNIT/ML injection vial  Commonly known as: HUMALOG  Inject 0-18 Units into the skin 3 times daily (with meals)     * insulin lispro 100 UNIT/ML injection vial  Commonly known as: HUMALOG  Inject 0-9 Units into the skin nightly     ipratropium-albuterol 0.5-2.5 (3) MG/3ML Soln nebulizer solution  Commonly known as: DUONEB  Inhale 3 mLs into the lungs every 4 hours as needed for Shortness of Breath     lactobacillus Tabs  Take 1 tablet by mouth 2 times daily     metFORMIN 500 MG tablet  Commonly known as: GLUCOPHAGE  Take 1 tablet by mouth 2 times daily (with meals)     ondansetron 4 MG disintegrating tablet  Commonly known as: Zofran ODT  Take 1 tablet by mouth every 8 hours as needed for Nausea     Ventolin  (90 Base) MCG/ACT inhaler  Generic drug: albuterol sulfate HFA         * This list has 2 medication(s) that are the same as other medications prescribed for you. Read the directions carefully, and ask your doctor or other care provider to review them with you. Time Spent on discharge is  35 mins in patient examination, evaluation, counseling as well as medication reconciliation, prescriptions for required medications, discharge plan and follow up. Electronically signed by   Francisca Florian MD  2/19/2021  9:11 AM      Thank you Dr. Endy Lim DO for the opportunity to be involved in this patient's care.

## 2021-03-05 ENCOUNTER — TELEPHONE (OUTPATIENT)
Dept: FAMILY MEDICINE CLINIC | Age: 66
End: 2021-03-05

## 2021-03-05 ENCOUNTER — HOSPITAL ENCOUNTER (EMERGENCY)
Age: 66
Discharge: HOME | End: 2021-03-06
Attending: EMERGENCY MEDICINE
Payer: MEDICARE

## 2021-03-05 VITALS
DIASTOLIC BLOOD PRESSURE: 63 MMHG | OXYGEN SATURATION: 97 % | SYSTOLIC BLOOD PRESSURE: 140 MMHG | RESPIRATION RATE: 18 BRPM | TEMPERATURE: 97.9 F | BODY MASS INDEX: 20.54 KG/M2 | HEART RATE: 105 BPM | HEIGHT: 73 IN | WEIGHT: 155 LBS

## 2021-03-05 DIAGNOSIS — R73.9 HYPERGLYCEMIA: Primary | ICD-10-CM

## 2021-03-05 LAB
-: ABNORMAL
ABSOLUTE EOS #: 0.3 K/UL (ref 0–0.44)
ABSOLUTE IMMATURE GRANULOCYTE: 0.03 K/UL (ref 0–0.3)
ABSOLUTE LYMPH #: 1.86 K/UL (ref 1.1–3.7)
ABSOLUTE MONO #: 0.67 K/UL (ref 0.1–1.2)
AMORPHOUS: ABNORMAL
ANION GAP SERPL CALCULATED.3IONS-SCNC: 10 MMOL/L (ref 9–17)
BACTERIA: ABNORMAL
BASOPHILS # BLD: 1 % (ref 0–2)
BASOPHILS ABSOLUTE: 0.1 K/UL (ref 0–0.2)
BILIRUBIN URINE: NEGATIVE
BUN BLDV-MCNC: 17 MG/DL (ref 8–23)
BUN/CREAT BLD: 21 (ref 9–20)
CALCIUM SERPL-MCNC: 9.1 MG/DL (ref 8.6–10.4)
CASTS UA: ABNORMAL /LPF
CASTS UA: ABNORMAL /LPF
CHLORIDE BLD-SCNC: 98 MMOL/L (ref 98–107)
CHP ED QC CHECK: NORMAL
CO2: 27 MMOL/L (ref 20–31)
COLOR: YELLOW
COMMENT UA: ABNORMAL
CREAT SERPL-MCNC: 0.8 MG/DL (ref 0.7–1.2)
CRYSTALS, UA: ABNORMAL /HPF
DIFFERENTIAL TYPE: ABNORMAL
EOSINOPHILS RELATIVE PERCENT: 3 % (ref 1–4)
EPITHELIAL CELLS UA: ABNORMAL /HPF (ref 0–5)
GFR AFRICAN AMERICAN: >60 ML/MIN
GFR NON-AFRICAN AMERICAN: >60 ML/MIN
GFR SERPL CREATININE-BSD FRML MDRD: ABNORMAL ML/MIN/{1.73_M2}
GFR SERPL CREATININE-BSD FRML MDRD: ABNORMAL ML/MIN/{1.73_M2}
GLUCOSE BLD-MCNC: 257 MG/DL
GLUCOSE BLD-MCNC: 257 MG/DL (ref 75–110)
GLUCOSE BLD-MCNC: 366 MG/DL (ref 70–99)
GLUCOSE BLD-MCNC: 375 MG/DL (ref 75–110)
GLUCOSE URINE: ABNORMAL
HCT VFR BLD CALC: 32.6 % (ref 40.7–50.3)
HEMOGLOBIN: 9.4 G/DL (ref 13–17)
IMMATURE GRANULOCYTES: 0 %
KETONES, URINE: NEGATIVE
LEUKOCYTE ESTERASE, URINE: NEGATIVE
LYMPHOCYTES # BLD: 21 % (ref 24–43)
MCH RBC QN AUTO: 24.6 PG (ref 25.2–33.5)
MCHC RBC AUTO-ENTMCNC: 28.8 G/DL (ref 28.4–34.8)
MCV RBC AUTO: 85.3 FL (ref 82.6–102.9)
MONOCYTES # BLD: 8 % (ref 3–12)
MUCUS: ABNORMAL
NITRITE, URINE: NEGATIVE
NRBC AUTOMATED: 0 PER 100 WBC
OTHER OBSERVATIONS UA: ABNORMAL
PDW BLD-RTO: 16.1 % (ref 11.8–14.4)
PH UA: 6 (ref 5–8)
PLATELET # BLD: 411 K/UL (ref 138–453)
PLATELET ESTIMATE: ABNORMAL
PMV BLD AUTO: 10.5 FL (ref 8.1–13.5)
POTASSIUM SERPL-SCNC: 4.6 MMOL/L (ref 3.7–5.3)
PROTEIN UA: ABNORMAL
RBC # BLD: 3.82 M/UL (ref 4.21–5.77)
RBC # BLD: ABNORMAL 10*6/UL
RBC UA: ABNORMAL /HPF (ref 0–2)
RENAL EPITHELIAL, UA: ABNORMAL /HPF
SEG NEUTROPHILS: 67 % (ref 36–65)
SEGMENTED NEUTROPHILS ABSOLUTE COUNT: 5.79 K/UL (ref 1.5–8.1)
SODIUM BLD-SCNC: 135 MMOL/L (ref 135–144)
SPECIFIC GRAVITY UA: 1.02 (ref 1–1.03)
TRICHOMONAS: ABNORMAL
TURBIDITY: CLEAR
URINE HGB: ABNORMAL
UROBILINOGEN, URINE: NORMAL
WBC # BLD: 8.8 K/UL (ref 3.5–11.3)
WBC # BLD: ABNORMAL 10*3/UL
WBC UA: ABNORMAL /HPF (ref 0–5)
YEAST: ABNORMAL

## 2021-03-05 PROCEDURE — 6360000002 HC RX W HCPCS: Performed by: EMERGENCY MEDICINE

## 2021-03-05 PROCEDURE — 80048 BASIC METABOLIC PNL TOTAL CA: CPT

## 2021-03-05 PROCEDURE — 6370000000 HC RX 637 (ALT 250 FOR IP): Performed by: EMERGENCY MEDICINE

## 2021-03-05 PROCEDURE — 85025 COMPLETE CBC W/AUTO DIFF WBC: CPT

## 2021-03-05 PROCEDURE — 81001 URINALYSIS AUTO W/SCOPE: CPT

## 2021-03-05 PROCEDURE — 2580000003 HC RX 258: Performed by: EMERGENCY MEDICINE

## 2021-03-05 PROCEDURE — 82947 ASSAY GLUCOSE BLOOD QUANT: CPT

## 2021-03-05 RX ORDER — 0.9 % SODIUM CHLORIDE 0.9 %
1000 INTRAVENOUS SOLUTION INTRAVENOUS ONCE
Status: COMPLETED | OUTPATIENT
Start: 2021-03-05 | End: 2021-03-05

## 2021-03-05 RX ADMIN — INSULIN HUMAN 8 UNITS: 100 INJECTION, SOLUTION PARENTERAL at 22:22

## 2021-03-05 RX ADMIN — SODIUM CHLORIDE 1000 ML: 9 INJECTION, SOLUTION INTRAVENOUS at 21:35

## 2021-03-05 RX ADMIN — HYDROMORPHONE HYDROCHLORIDE 0.5 MG: 1 INJECTION, SOLUTION INTRAMUSCULAR; INTRAVENOUS; SUBCUTANEOUS at 23:52

## 2021-03-05 NOTE — PROGRESS NOTES
VASCULAR SURGERY  PROGRESS NOTE      1/8/2021 1:09 PM  Subjective:   Admit Date: 12/31/2020  PCP: Cassia Robles DO    Chief Complaint   Patient presents with    Foot Pain     L side     Interval History: No complaints. Podiatry noted. Diet: DIET CARB CONTROL; Carb Control: 3 carb choices (45 gms)/meal  Dietary Nutrition Supplements: Diabetic Oral Supplement    Medications:   Scheduled Meds:   insulin glargine  35 Units Subcutaneous BID    budesonide-formoterol  2 puff Inhalation BID    guaiFENesin  600 mg Oral BID    cefTRIAXone (ROCEPHIN) IV  2 g Intravenous Q24H    sodium hypochlorite   Irrigation Daily    neomycin-bacitracin-polymyxin   Topical BID    apixaban  5 mg Oral BID    metFORMIN  500 mg Oral BID WC    nortriptyline  50 mg Oral Nightly    sodium chloride flush  10 mL Intravenous 2 times per day    insulin lispro  0-18 Units Subcutaneous TID WC    insulin lispro  0-9 Units Subcutaneous Nightly    famotidine  20 mg Oral BID     Continuous Infusions:   dextrose Stopped (01/05/21 1753)         Labs:   CBC:   Recent Labs     01/06/21  0548 01/07/21  0619 01/08/21  0611   WBC 11.7* 10.4 9.6   HGB 9.0* 8.6* 9.1*   * 531* 544*     BMP:    Recent Labs     01/06/21  0548 01/07/21  0619 01/08/21  0611    138 141   K 4.6 4.0 4.1   CL 98 103 105   CO2 26 25 27   BUN 12 16 16   CREATININE 0.78 0.90 0.77   GLUCOSE 357* 126* 139*     Hepatic: No results for input(s): AST, ALT, ALB, BILITOT, ALKPHOS in the last 72 hours. Troponin: Invalid input(s): TROPONIN  BNP: No results for input(s): BNP in the last 72 hours. Lipids: No results for input(s): CHOL, HDL in the last 72 hours. Invalid input(s): LDLCALCU  INR: No results for input(s): INR in the last 72 hours.     Objective:   Vitals: /63   Pulse 97   Temp 98.2 °F (36.8 °C) (Oral)   Resp 16   Ht 6' 1\" (1.854 m)   Wt 149 lb 6.4 oz (67.8 kg)   SpO2 96%   BMI 19.71 kg/m²   General appearance: alert, cooperative and no distress  Mental Status: oriented to person, place and time with normal affect  Neck: good carotid pulses, no JVD  Lungs: clear to auscultation bilaterally, normal effort  Heart: regular rate and rhythm, no murmur,  Abdomen: soft, non-tender, non-distended, bowel sounds present all four quadrants, no masses, hepatomegaly, splenomegaly or aortic enlargement  Extremities: peripheral pulses by Doppler, right BKA site healed, left TMA with open ulcer, no erythema, positive bleeding at skin edges      Assessment:   3 82-year-old male with left TMA ulcer/foot infection - improving  2. PAD  3. Diabetes mellitus  4.  Noncompliance      Patient Active Problem List:     Type 2 diabetes mellitus with diabetic polyneuropathy, with long-term current use of insulin (HCC)     Neuropathic pain, leg     Diabetic polyneuropathy (HCC)     Allergic rhinitis     Tobacco dependence     Edentulous     Dysphagia     Lung nodules     Esophageal cancer (HCC)     Fracture of humerus, left, closed     Closed fracture of humerus     DM type 2 with diabetic peripheral neuropathy (HCC)     Chronic obstructive pulmonary disease (HCC)     HLD (hyperlipidemia)     Gastroesophageal reflux disease     Chronic pain syndrome     Marijuana use     History of colon polyps     Cellulitis of right heel     Functional diarrhea     Sepsis due to methicillin resistant Staphylococcus aureus (MRSA) (HCC)     History of esophageal cancer     Osteomyelitis, chronic, ankle or foot ([address]     Peripheral artery disease ([address]     Other pulmonary embolism without acute cor pulmonale (HCC)     Carotid stenosis, asymptomatic, bilateral     Lower limb amputation status     Fx humeral neck, right, closed, initial encounter     Transient hypotension     Constipation due to opioid therapy     Acute kidney injury ([address]     Diabetic ulcer of left midfoot associated with type 2 diabetes mellitus, with fat layer exposed ([address]     Complication of below knee amputation stump ([address]     COPD exacerbation (HCC)     Hyponatremia     Pain, phantom limb ([address]     Below-knee amputation of right lower extremity (HCC)     Moderate protein malnutrition ([address]     Essential hypertension     Uncontrolled type 2 diabetes mellitus with hyperglycemia ([address]     History of pulmonary embolism - 2017     Atelectasis     Uncontrolled type 2 diabetes mellitus with foot ulcer (HCC)     Azotemia     Anemia, normocytic normochromic     Osteomyelitis of fourth phalange of left foot (HCC)     Drug-induced constipation     Osteomyelitis (HCC)     Diabetic foot infection ([address]     Noncompliance     Type 1 diabetes mellitus with diabetic foot infection ([address]     Acute osteomyelitis of left foot (HCC)     Cellulitis of left foot     Mild malnutrition (HCC)     Diabetic infection of left foot (HCC)     Hypocalcemia     Hypokalemia      Plan:   1. Continue ABX and dressing changes  2. Smoking cessation  3. Hyperbaric oxygen therapy  4. Patient understands strict blood sugar control as necessary for limb salvage  5.    Awaiting placement    Electronically signed by Jinny Vera MD on 1/8/2021 at 1:09 PM Patient with one or more new problems requiring additional work-up/treatment.

## 2021-03-05 NOTE — TELEPHONE ENCOUNTER
Home care calling, he needs a new Meter and strips sent over to [facility], his meter he has now is not working   They are hoping to have sent over asap did advise it may not be until Monday

## 2021-03-06 VITALS
SYSTOLIC BLOOD PRESSURE: 135 MMHG | RESPIRATION RATE: 16 BRPM | HEIGHT: 72 IN | BODY MASS INDEX: 20.99 KG/M2 | OXYGEN SATURATION: 93 % | DIASTOLIC BLOOD PRESSURE: 62 MMHG | WEIGHT: 155 LBS | TEMPERATURE: 98.3 F | HEART RATE: 93 BPM

## 2021-03-06 DIAGNOSIS — R73.9 HYPERGLYCEMIA: Primary | ICD-10-CM

## 2021-03-06 LAB
ABSOLUTE EOS #: 0.32 K/UL (ref 0–0.44)
ABSOLUTE IMMATURE GRANULOCYTE: 0.04 K/UL (ref 0–0.3)
ABSOLUTE LYMPH #: 1.62 K/UL (ref 1.1–3.7)
ABSOLUTE MONO #: 0.63 K/UL (ref 0.1–1.2)
ALBUMIN SERPL-MCNC: 3.6 G/DL (ref 3.5–5.2)
ALBUMIN/GLOBULIN RATIO: ABNORMAL (ref 1–2.5)
ALP BLD-CCNC: 163 U/L (ref 40–129)
ALT SERPL-CCNC: 13 U/L (ref 5–41)
ANION GAP SERPL CALCULATED.3IONS-SCNC: 9 MMOL/L (ref 9–17)
AST SERPL-CCNC: 11 U/L
BASOPHILS # BLD: 1 % (ref 0–2)
BASOPHILS ABSOLUTE: 0.07 K/UL (ref 0–0.2)
BILIRUB SERPL-MCNC: 0.14 MG/DL (ref 0.3–1.2)
BUN BLDV-MCNC: 15 MG/DL (ref 8–23)
BUN/CREAT BLD: 20 (ref 9–20)
CALCIUM SERPL-MCNC: 9.2 MG/DL (ref 8.6–10.4)
CHLORIDE BLD-SCNC: 99 MMOL/L (ref 98–107)
CO2: 29 MMOL/L (ref 20–31)
CREAT SERPL-MCNC: 0.75 MG/DL (ref 0.7–1.2)
DIFFERENTIAL TYPE: ABNORMAL
EKG ATRIAL RATE: 84 BPM
EKG P AXIS: 54 DEGREES
EKG P-R INTERVAL: 134 MS
EKG Q-T INTERVAL: 372 MS
EKG QRS DURATION: 82 MS
EKG QTC CALCULATION (BAZETT): 439 MS
EKG R AXIS: 34 DEGREES
EKG T AXIS: 41 DEGREES
EKG VENTRICULAR RATE: 84 BPM
EOSINOPHILS RELATIVE PERCENT: 3 % (ref 1–4)
GFR AFRICAN AMERICAN: >60 ML/MIN
GFR NON-AFRICAN AMERICAN: >60 ML/MIN
GFR SERPL CREATININE-BSD FRML MDRD: ABNORMAL ML/MIN/{1.73_M2}
GFR SERPL CREATININE-BSD FRML MDRD: ABNORMAL ML/MIN/{1.73_M2}
GLUCOSE BLD-MCNC: 310 MG/DL (ref 70–99)
HCT VFR BLD CALC: 33.4 % (ref 40.7–50.3)
HEMOGLOBIN: 9.7 G/DL (ref 13–17)
IMMATURE GRANULOCYTES: 0 %
LIPASE: 13 U/L (ref 13–60)
LYMPHOCYTES # BLD: 17 % (ref 24–43)
MCH RBC QN AUTO: 24.6 PG (ref 25.2–33.5)
MCHC RBC AUTO-ENTMCNC: 29 G/DL (ref 28.4–34.8)
MCV RBC AUTO: 84.8 FL (ref 82.6–102.9)
MONOCYTES # BLD: 7 % (ref 3–12)
NRBC AUTOMATED: 0 PER 100 WBC
PDW BLD-RTO: 16 % (ref 11.8–14.4)
PLATELET # BLD: 419 K/UL (ref 138–453)
PLATELET ESTIMATE: ABNORMAL
PMV BLD AUTO: 9.8 FL (ref 8.1–13.5)
POTASSIUM SERPL-SCNC: 4.4 MMOL/L (ref 3.7–5.3)
RBC # BLD: 3.94 M/UL (ref 4.21–5.77)
RBC # BLD: ABNORMAL 10*6/UL
SEG NEUTROPHILS: 72 % (ref 36–65)
SEGMENTED NEUTROPHILS ABSOLUTE COUNT: 6.73 K/UL (ref 1.5–8.1)
SODIUM BLD-SCNC: 137 MMOL/L (ref 135–144)
TOTAL PROTEIN: 7.6 G/DL (ref 6.4–8.3)
WBC # BLD: 9.4 K/UL (ref 3.5–11.3)
WBC # BLD: ABNORMAL 10*3/UL

## 2021-03-06 PROCEDURE — 93005 ELECTROCARDIOGRAM TRACING: CPT | Performed by: EMERGENCY MEDICINE

## 2021-03-06 PROCEDURE — 94640 AIRWAY INHALATION TREATMENT: CPT

## 2021-03-06 PROCEDURE — 6370000000 HC RX 637 (ALT 250 FOR IP): Performed by: EMERGENCY MEDICINE

## 2021-03-06 PROCEDURE — 83690 ASSAY OF LIPASE: CPT

## 2021-03-06 PROCEDURE — 96374 THER/PROPH/DIAG INJ IV PUSH: CPT

## 2021-03-06 PROCEDURE — 85025 COMPLETE CBC W/AUTO DIFF WBC: CPT

## 2021-03-06 PROCEDURE — 36415 COLL VENOUS BLD VENIPUNCTURE: CPT

## 2021-03-06 PROCEDURE — 80053 COMPREHEN METABOLIC PANEL: CPT

## 2021-03-06 PROCEDURE — 99283 EMERGENCY DEPT VISIT LOW MDM: CPT

## 2021-03-06 PROCEDURE — 96372 THER/PROPH/DIAG INJ SC/IM: CPT

## 2021-03-06 RX ORDER — HYDROCODONE BITARTRATE AND ACETAMINOPHEN 5; 325 MG/1; MG/1
1 TABLET ORAL ONCE
Status: COMPLETED | OUTPATIENT
Start: 2021-03-06 | End: 2021-03-06

## 2021-03-06 RX ORDER — ALBUTEROL SULFATE 90 UG/1
2 AEROSOL, METERED RESPIRATORY (INHALATION) ONCE
Status: COMPLETED | OUTPATIENT
Start: 2021-03-06 | End: 2021-03-06

## 2021-03-06 RX ORDER — ONDANSETRON 4 MG/1
4 TABLET, ORALLY DISINTEGRATING ORAL EVERY 8 HOURS PRN
Qty: 10 TABLET | Refills: 0 | Status: SHIPPED | OUTPATIENT
Start: 2021-03-06 | End: 2021-03-12

## 2021-03-06 RX ADMIN — HYDROCODONE BITARTRATE AND ACETAMINOPHEN 1 TABLET: 5; 325 TABLET ORAL at 08:44

## 2021-03-06 RX ADMIN — ALBUTEROL SULFATE 2 PUFF: 108 AEROSOL, METERED RESPIRATORY (INHALATION) at 09:26

## 2021-03-06 ASSESSMENT — ENCOUNTER SYMPTOMS
DIARRHEA: 1
TROUBLE SWALLOWING: 0
ABDOMINAL PAIN: 1
VOMITING: 1
EYE REDNESS: 0
SHORTNESS OF BREATH: 0

## 2021-03-06 ASSESSMENT — PAIN SCALES - GENERAL: PAINLEVEL_OUTOF10: 7
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
## 2021-03-06 NOTE — ED NOTES
Bed: 12  Expected date:   Expected time:   Means of arrival:   Comments:  [address] [place], RN  03/05/21 2012

## 2021-03-07 RX ORDER — BLOOD-GLUCOSE METER
1 KIT MISCELLANEOUS DAILY
Qty: 1 KIT | Refills: 0 | Status: SHIPPED | OUTPATIENT
Start: 2021-03-07 | End: 2021-09-24 | Stop reason: SDUPTHER

## 2021-03-07 RX ORDER — LANCETS 30 GAUGE
1 EACH MISCELLANEOUS 3 TIMES DAILY
Qty: 600 EACH | Refills: 1 | Status: SHIPPED | OUTPATIENT
Start: 2021-03-07 | End: 2021-09-28 | Stop reason: SDUPTHER

## 2021-03-11 ENCOUNTER — TELEPHONE (OUTPATIENT)
Dept: FAMILY MEDICINE CLINIC | Age: 66
End: 2021-03-11

## 2021-03-12 ENCOUNTER — HOSPITAL ENCOUNTER (EMERGENCY)
Age: 66
Discharge: HOME | End: 2021-03-12
Attending: EMERGENCY MEDICINE
Payer: MEDICARE

## 2021-03-12 VITALS
RESPIRATION RATE: 18 BRPM | HEART RATE: 90 BPM | BODY MASS INDEX: 20.32 KG/M2 | SYSTOLIC BLOOD PRESSURE: 133 MMHG | OXYGEN SATURATION: 98 % | HEIGHT: 72 IN | DIASTOLIC BLOOD PRESSURE: 68 MMHG | TEMPERATURE: 97.5 F | WEIGHT: 150 LBS

## 2021-03-12 DIAGNOSIS — Z13.9 ENCOUNTER FOR MEDICAL SCREENING EXAMINATION: Primary | ICD-10-CM

## 2021-03-12 PROCEDURE — 6370000000 HC RX 637 (ALT 250 FOR IP): Performed by: EMERGENCY MEDICINE

## 2021-03-12 PROCEDURE — 96372 THER/PROPH/DIAG INJ SC/IM: CPT

## 2021-03-12 PROCEDURE — 99284 EMERGENCY DEPT VISIT MOD MDM: CPT

## 2021-03-12 PROCEDURE — 6360000002 HC RX W HCPCS: Performed by: EMERGENCY MEDICINE

## 2021-03-12 RX ORDER — MORPHINE SULFATE 4 MG/ML
4 INJECTION, SOLUTION INTRAMUSCULAR; INTRAVENOUS ONCE
Status: COMPLETED | OUTPATIENT
Start: 2021-03-12 | End: 2021-03-12

## 2021-03-12 RX ORDER — FAMOTIDINE 20 MG/1
20 TABLET, FILM COATED ORAL ONCE
Status: COMPLETED | OUTPATIENT
Start: 2021-03-12 | End: 2021-03-12

## 2021-03-12 RX ORDER — HYDROCODONE BITARTRATE AND ACETAMINOPHEN 5; 325 MG/1; MG/1
1 TABLET ORAL ONCE
Status: DISCONTINUED | OUTPATIENT
Start: 2021-03-12 | End: 2021-03-12

## 2021-03-12 RX ORDER — MAGNESIUM HYDROXIDE/ALUMINUM HYDROXICE/SIMETHICONE 120; 1200; 1200 MG/30ML; MG/30ML; MG/30ML
30 SUSPENSION ORAL ONCE
Status: COMPLETED | OUTPATIENT
Start: 2021-03-12 | End: 2021-03-12

## 2021-03-12 RX ADMIN — Medication 4 MG: at 22:05

## 2021-03-12 RX ADMIN — ALUMINUM HYDROXIDE, MAGNESIUM HYDROXIDE, AND SIMETHICONE 30 ML: 200; 200; 20 SUSPENSION ORAL at 22:50

## 2021-03-12 RX ADMIN — FAMOTIDINE 20 MG: 20 TABLET ORAL at 22:50

## 2021-03-12 ASSESSMENT — PAIN SCALES - GENERAL: PAINLEVEL_OUTOF10: 7

## 2021-03-13 ENCOUNTER — HOSPITAL ENCOUNTER (EMERGENCY)
Age: 66
Discharge: HOME | End: 2021-03-13
Attending: EMERGENCY MEDICINE
Payer: MEDICARE

## 2021-03-13 ENCOUNTER — HOSPITAL ENCOUNTER (EMERGENCY)
Age: 66
Discharge: HOME | End: 2021-03-14
Attending: EMERGENCY MEDICINE
Payer: MEDICARE

## 2021-03-13 VITALS
DIASTOLIC BLOOD PRESSURE: 50 MMHG | HEIGHT: 72 IN | OXYGEN SATURATION: 99 % | RESPIRATION RATE: 21 BRPM | HEART RATE: 86 BPM | SYSTOLIC BLOOD PRESSURE: 113 MMHG | BODY MASS INDEX: 20.32 KG/M2 | WEIGHT: 150 LBS | TEMPERATURE: 97.7 F

## 2021-03-13 VITALS
DIASTOLIC BLOOD PRESSURE: 63 MMHG | RESPIRATION RATE: 16 BRPM | TEMPERATURE: 97.9 F | WEIGHT: 150 LBS | BODY MASS INDEX: 20.34 KG/M2 | HEART RATE: 91 BPM | SYSTOLIC BLOOD PRESSURE: 139 MMHG | OXYGEN SATURATION: 98 %

## 2021-03-13 DIAGNOSIS — Z13.9 ENCOUNTER FOR MEDICAL SCREENING EXAMINATION: Primary | ICD-10-CM

## 2021-03-13 PROCEDURE — 99284 EMERGENCY DEPT VISIT MOD MDM: CPT

## 2021-03-13 PROCEDURE — 6370000000 HC RX 637 (ALT 250 FOR IP): Performed by: EMERGENCY MEDICINE

## 2021-03-13 PROCEDURE — 99282 EMERGENCY DEPT VISIT SF MDM: CPT

## 2021-03-13 RX ORDER — HYDROCODONE BITARTRATE AND ACETAMINOPHEN 5; 325 MG/1; MG/1
1 TABLET ORAL ONCE
Status: COMPLETED | OUTPATIENT
Start: 2021-03-13 | End: 2021-03-13

## 2021-03-13 RX ORDER — FAMOTIDINE 20 MG/1
20 TABLET, FILM COATED ORAL ONCE
Status: COMPLETED | OUTPATIENT
Start: 2021-03-14 | End: 2021-03-14

## 2021-03-13 RX ADMIN — HYDROCODONE BITARTRATE AND ACETAMINOPHEN 1 TABLET: 5; 325 TABLET ORAL at 23:38

## 2021-03-13 ASSESSMENT — PAIN SCALES - GENERAL: PAINLEVEL_OUTOF10: 7

## 2021-03-13 ASSESSMENT — PAIN DESCRIPTION - LOCATION: LOCATION: LEG

## 2021-03-13 ASSESSMENT — PAIN DESCRIPTION - DESCRIPTORS: DESCRIPTORS: THROBBING

## 2021-03-13 ASSESSMENT — PAIN DESCRIPTION - PAIN TYPE
TYPE: CHRONIC PAIN
TYPE: ACUTE PAIN

## 2021-03-13 NOTE — ED NOTES
Pt states he has no way to unlock his door at home because he could not find his keys once transport took him home from the ED earlier tonight the first time. Pt states he has no one else to call to get in his house or to stay with.       Ray Jones RN  03/13/21 8452
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
## 2021-03-14 PROCEDURE — 6370000000 HC RX 637 (ALT 250 FOR IP): Performed by: EMERGENCY MEDICINE

## 2021-03-14 RX ADMIN — FAMOTIDINE 20 MG: 20 TABLET ORAL at 00:00
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
## 2021-03-15 ENCOUNTER — TELEPHONE (OUTPATIENT)
Dept: FAMILY MEDICINE CLINIC | Age: 66
End: 2021-03-15

## 2021-03-15 NOTE — TELEPHONE ENCOUNTER
Patient called stating that he takes insulin and he is unable to afford to buy. He wants to know if he can get some samples in the office. He states that he takes lantis.      Please advise   Thank you

## 2021-03-17 ENCOUNTER — TELEPHONE (OUTPATIENT)
Dept: FAMILY MEDICINE CLINIC | Age: 66
End: 2021-03-17

## 2021-03-17 ENCOUNTER — PARAMEDICINE (OUTPATIENT)
Dept: OTHER | Age: 66
End: 2021-03-17

## 2021-03-17 NOTE — PROGRESS NOTES
Pt is a new TFRD referral, pt was referred due to recent amputation of leg. Pt had began calling 911 for someone to come fix his thermostat and to open or shut doors in his house. CPP went to visit with pt, pt sitting in wheelchair and would not allow team inside at first, pt eventually let us in and began asking questions about a CO detector and had CP look at one that he had laying on a table, CP encouraged pt not to use this one but to go and purchase a new one because the one he has now  in . Pt stated that he was fine now, he was calling frequently due to the fact that he had been out of his insulin and couldn't afford it but that he had some help now to pay it, pt also stated he had rx's at [facility] down the road and someone would be picking them up for him. Pt stated he was selling his house and moving to [place] to live with his daughter so he had more care available for him. Pt declined CPP help, pt left with contact information should he need some type of assistance in the future. Pt did not wish team to go around and do a home safety assessment and he did not wish to do a basic needs assessment either. Pt declined vitals assessment from CP as well. COVID 19 Pre-Visit Screening    Are you experiencing any of the following symptoms? Please answer yes or no to the following symptoms/questions:      Cough NO       Shortness of breath or difficulty breaking NO  Fever NO  Chills NO  Muscle pain NO  Sore throat NO  New loss of taste or smell NO    Have you been in contact with someone known or presumed to have COVID-19 within the past 14 days?      NO

## 2021-03-19 ENCOUNTER — VIRTUAL VISIT (OUTPATIENT)
Dept: FAMILY MEDICINE CLINIC | Age: 66
End: 2021-03-19
Payer: MEDICARE

## 2021-03-19 DIAGNOSIS — Z13.220 SCREENING FOR HYPERLIPIDEMIA: Primary | ICD-10-CM

## 2021-03-19 DIAGNOSIS — M79.2 NEUROPATHIC PAIN OF RIGHT LOWER EXTREMITY: ICD-10-CM

## 2021-03-19 DIAGNOSIS — E11.42 TYPE 2 DIABETES MELLITUS WITH DIABETIC POLYNEUROPATHY, WITH LONG-TERM CURRENT USE OF INSULIN (HCC): ICD-10-CM

## 2021-03-19 DIAGNOSIS — Z79.4 TYPE 2 DIABETES MELLITUS WITH DIABETIC POLYNEUROPATHY, WITH LONG-TERM CURRENT USE OF INSULIN (HCC): ICD-10-CM

## 2021-03-19 DIAGNOSIS — E11.42 DIABETIC POLYNEUROPATHY ASSOCIATED WITH TYPE 2 DIABETES MELLITUS (HCC): ICD-10-CM

## 2021-03-19 PROCEDURE — 99442 PR PHYS/QHP TELEPHONE EVALUATION 11-20 MIN: CPT | Performed by: INTERNAL MEDICINE

## 2021-03-19 RX ORDER — AMITRIPTYLINE HYDROCHLORIDE 75 MG/1
75 TABLET, FILM COATED ORAL NIGHTLY
Qty: 30 TABLET | Refills: 3 | Status: SHIPPED | OUTPATIENT
Start: 2021-03-19 | End: 2021-09-24 | Stop reason: SDUPTHER

## 2021-03-19 RX ORDER — OXYCODONE HYDROCHLORIDE AND ACETAMINOPHEN 5; 325 MG/1; MG/1
TABLET ORAL
Status: ON HOLD | COMMUNITY
Start: 2021-03-15 | End: 2021-04-09 | Stop reason: HOSPADM

## 2021-03-19 RX ORDER — HYDROXYZINE PAMOATE 25 MG/1
25 CAPSULE ORAL 3 TIMES DAILY PRN
COMMUNITY
Start: 2021-03-15 | End: 2021-10-05 | Stop reason: SDUPTHER

## 2021-03-19 RX ORDER — DOXEPIN HYDROCHLORIDE 100 MG/1
100 CAPSULE ORAL NIGHTLY
Qty: 30 CAPSULE | Refills: 3 | Status: ON HOLD | OUTPATIENT
Start: 2021-03-19 | End: 2021-04-05

## 2021-03-19 RX ORDER — INSULIN GLARGINE 100 [IU]/ML
INJECTION, SOLUTION SUBCUTANEOUS NIGHTLY
Status: ON HOLD | COMMUNITY
End: 2021-04-09 | Stop reason: HOSPADM

## 2021-03-19 ASSESSMENT — PATIENT HEALTH QUESTIONNAIRE - PHQ9
1. LITTLE INTEREST OR PLEASURE IN DOING THINGS: 0
SUM OF ALL RESPONSES TO PHQ QUESTIONS 1-9: 0
SUM OF ALL RESPONSES TO PHQ9 QUESTIONS 1 & 2: 0

## 2021-03-19 NOTE — PROGRESS NOTES
Josseline Horvath is a 63 y.o. male evaluated via telephone on 3/19/2021. Consent:  He and/or health care decision maker is aware that that he may receive a bill for this telephone service, depending on his insurance coverage, and has provided verbal consent to proceed: Yes      Documentation:  I communicated with the patient and/or health care decision maker about  Still having leg pain   Around stump from bka recent left amputation in feb   Also has hx of bka to RLE   Needs new pain management referral     Patient has had good sugars per pt  Picked up from our samples for insulin last week     Also would like something for sleep   Has tried trazodone in the past .   Details of this discussion including any medical advice provided:     Referral to dr. Liane Koehler     Will trial elavil for sleep   Reviewed SE  May also with nerve pain   Up date labs for DM  Call with q/c  Advised patient to f/u with specialist as he was told to at discharge in February and has not ! I affirm this is a Patient Initiated Episode with a Patient who has not had a related appointment within my department in the past 7 days or scheduled within the next 24 hours. Patient identification was verified at the start of the visit: Yes    Total Time: minutes: 11-20 minutes    The visit was conducted pursuant to the emergency declaration under the [address] [place] [address], [address] [place] [address] authority and the [employer] and [employer] Act. Patient identification was verified, and a caregiver was present when appropriate. The patient was located in a state where the provider was credentialed to provide care.     Note: not billable if this call serves to triage the patient into an appointment for the relevant concern      Toney Kayser

## 2021-03-22 ENCOUNTER — TELEPHONE (OUTPATIENT)
Dept: PAIN MANAGEMENT | Age: 66
End: 2021-03-22

## 2021-03-29 ENCOUNTER — HOSPITAL ENCOUNTER (EMERGENCY)
Age: 66
Discharge: HOME | End: 2021-03-30
Attending: EMERGENCY MEDICINE
Payer: MEDICARE

## 2021-03-29 VITALS
HEIGHT: 72 IN | TEMPERATURE: 98.3 F | WEIGHT: 150 LBS | HEART RATE: 96 BPM | OXYGEN SATURATION: 98 % | BODY MASS INDEX: 20.32 KG/M2 | DIASTOLIC BLOOD PRESSURE: 87 MMHG | RESPIRATION RATE: 16 BRPM | SYSTOLIC BLOOD PRESSURE: 151 MMHG

## 2021-03-29 DIAGNOSIS — M79.605 LEFT LEG PAIN: Primary | ICD-10-CM

## 2021-03-29 PROCEDURE — 6360000002 HC RX W HCPCS: Performed by: EMERGENCY MEDICINE

## 2021-03-29 PROCEDURE — 96372 THER/PROPH/DIAG INJ SC/IM: CPT

## 2021-03-29 PROCEDURE — 6370000000 HC RX 637 (ALT 250 FOR IP): Performed by: EMERGENCY MEDICINE

## 2021-03-29 PROCEDURE — 99284 EMERGENCY DEPT VISIT MOD MDM: CPT

## 2021-03-29 PROCEDURE — 64450 NJX AA&/STRD OTHER PN/BRANCH: CPT

## 2021-03-29 RX ORDER — MORPHINE SULFATE 10 MG/ML
6 INJECTION, SOLUTION INTRAMUSCULAR; INTRAVENOUS ONCE
Status: DISCONTINUED | OUTPATIENT
Start: 2021-03-29 | End: 2021-03-29

## 2021-03-29 RX ORDER — ONDANSETRON 4 MG/1
4 TABLET, ORALLY DISINTEGRATING ORAL ONCE
Status: COMPLETED | OUTPATIENT
Start: 2021-03-29 | End: 2021-03-29

## 2021-03-29 RX ORDER — ACETAMINOPHEN 500 MG
1000 TABLET ORAL ONCE
Status: COMPLETED | OUTPATIENT
Start: 2021-03-29 | End: 2021-03-29

## 2021-03-29 RX ORDER — IBUPROFEN 800 MG/1
800 TABLET ORAL ONCE
Status: COMPLETED | OUTPATIENT
Start: 2021-03-29 | End: 2021-03-29

## 2021-03-29 RX ORDER — MORPHINE SULFATE 4 MG/ML
6 INJECTION, SOLUTION INTRAMUSCULAR; INTRAVENOUS ONCE
Status: COMPLETED | OUTPATIENT
Start: 2021-03-29 | End: 2021-03-29

## 2021-03-29 RX ADMIN — ACETAMINOPHEN 1000 MG: 500 TABLET ORAL at 23:45

## 2021-03-29 RX ADMIN — ONDANSETRON 4 MG: 4 TABLET, ORALLY DISINTEGRATING ORAL at 23:45

## 2021-03-29 RX ADMIN — MORPHINE SULFATE 6 MG: 4 INJECTION, SOLUTION INTRAMUSCULAR; INTRAVENOUS at 23:45

## 2021-03-29 RX ADMIN — IBUPROFEN 800 MG: 800 TABLET, FILM COATED ORAL at 23:45

## 2021-03-30 PROCEDURE — 2500000003 HC RX 250 WO HCPCS: Performed by: EMERGENCY MEDICINE

## 2021-03-30 RX ORDER — OXYCODONE HYDROCHLORIDE AND ACETAMINOPHEN 5; 325 MG/1; MG/1
1 TABLET ORAL EVERY 6 HOURS PRN
Qty: 6 TABLET | Refills: 0 | Status: ON HOLD | OUTPATIENT
Start: 2021-03-30 | End: 2021-04-09 | Stop reason: HOSPADM

## 2021-03-30 RX ORDER — LIDOCAINE HYDROCHLORIDE 10 MG/ML
5 INJECTION, SOLUTION INFILTRATION; PERINEURAL ONCE
Status: COMPLETED | OUTPATIENT
Start: 2021-03-30 | End: 2021-03-30

## 2021-03-30 RX ORDER — LIDOCAINE HYDROCHLORIDE AND EPINEPHRINE 10; 10 MG/ML; UG/ML
20 INJECTION, SOLUTION INFILTRATION; PERINEURAL ONCE
Status: COMPLETED | OUTPATIENT
Start: 2021-03-30 | End: 2021-03-30

## 2021-03-30 RX ADMIN — LIDOCAINE HYDROCHLORIDE,EPINEPHRINE BITARTRATE 20 ML: 10; .01 INJECTION, SOLUTION INFILTRATION; PERINEURAL at 01:05

## 2021-03-30 RX ADMIN — LIDOCAINE HYDROCHLORIDE 5 ML: 10 INJECTION, SOLUTION INFILTRATION; PERINEURAL at 01:06
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
## 2021-03-30 NOTE — ED NOTES
Bed: 23  Expected date:   Expected time:   Means of arrival:   Comments:  [id number] [facility] [employer] Hayley Ritchie, [facility]  03/29/21 2547

## 2021-03-30 NOTE — ED NOTES
Dr. Dearl Sandifer at bedside to do a nerve block on left leg.       Anil Betancourt RN  03/30/21 3578

## 2021-04-01 ENCOUNTER — TELEPHONE (OUTPATIENT)
Dept: FAMILY MEDICINE CLINIC | Age: 66
End: 2021-04-01

## 2021-04-01 NOTE — TELEPHONE ENCOUNTER
Patricio Dos Santos from [address] [place] [address] St called stating that patient has reported nausea and vomiting extending over the last 2 days, stated that he threw up 3x's today. Vitals were fine, no fever (97.3F), blood sugar was 200, stated that he took his insulin. Patricio Dos Santos stated that she was just making an informative call. Nina Larose stated that patient had stated to her that he was supposed to be getting referred to pain management and says he hasnt heard back. .. Please advise.

## 2021-04-01 NOTE — TELEPHONE ENCOUNTER
[id number] Deidre mccord noted would be see at [facility] or ED if sxs worsen , It appears the recent pain management doc I sent him to refused or declined to see him so I am going to have to find someone else

## 2021-04-03 ENCOUNTER — APPOINTMENT (OUTPATIENT)
Dept: GENERAL RADIOLOGY | Age: 66
DRG: 501 | End: 2021-04-03
Payer: MEDICARE

## 2021-04-03 ENCOUNTER — HOSPITAL ENCOUNTER (INPATIENT)
Age: 66
LOS: 9 days | Discharge: NURSING, REHAB OR LONG-TERM CARE | DRG: 501 | End: 2021-04-12
Attending: EMERGENCY MEDICINE | Admitting: INTERNAL MEDICINE
Payer: MEDICARE

## 2021-04-03 DIAGNOSIS — M79.2 NEUROPATHIC PAIN OF RIGHT LOWER EXTREMITY: ICD-10-CM

## 2021-04-03 DIAGNOSIS — L03.90 CELLULITIS, UNSPECIFIED CELLULITIS SITE: Primary | ICD-10-CM

## 2021-04-03 DIAGNOSIS — M86.9: ICD-10-CM

## 2021-04-03 LAB
ABSOLUTE EOS #: 0.77 K/UL (ref 0–0.4)
ABSOLUTE IMMATURE GRANULOCYTE: 0 K/UL (ref 0–0.3)
ABSOLUTE LYMPH #: 0.52 K/UL (ref 1–4.8)
ABSOLUTE MONO #: 0.86 K/UL (ref 0.2–0.8)
ANION GAP SERPL CALCULATED.3IONS-SCNC: 14 MMOL/L (ref 9–17)
BASOPHILS # BLD: 1 %
BASOPHILS ABSOLUTE: 0.09 K/UL (ref 0–0.2)
BUN BLDV-MCNC: 14 MG/DL (ref 8–23)
BUN/CREAT BLD: 16 (ref 9–20)
CALCIUM SERPL-MCNC: 8.8 MG/DL (ref 8.6–10.4)
CHLORIDE BLD-SCNC: 100 MMOL/L (ref 98–107)
CO2: 22 MMOL/L (ref 20–31)
CREAT SERPL-MCNC: 0.85 MG/DL (ref 0.7–1.2)
DIFFERENTIAL TYPE: ABNORMAL
EOSINOPHILS RELATIVE PERCENT: 9 % (ref 1–4)
GFR AFRICAN AMERICAN: >60 ML/MIN
GFR NON-AFRICAN AMERICAN: >60 ML/MIN
GFR SERPL CREATININE-BSD FRML MDRD: ABNORMAL ML/MIN/{1.73_M2}
GFR SERPL CREATININE-BSD FRML MDRD: ABNORMAL ML/MIN/{1.73_M2}
GLUCOSE BLD-MCNC: 293 MG/DL (ref 75–110)
GLUCOSE BLD-MCNC: 319 MG/DL (ref 70–99)
HCT VFR BLD CALC: 31.7 % (ref 40.7–50.3)
HEMOGLOBIN: 9.3 G/DL (ref 13–17)
IMMATURE GRANULOCYTES: 0 %
LACTIC ACID, SEPSIS WHOLE BLOOD: NORMAL MMOL/L (ref 0.5–1.9)
LACTIC ACID, SEPSIS WHOLE BLOOD: NORMAL MMOL/L (ref 0.5–1.9)
LACTIC ACID, SEPSIS: 0.8 MMOL/L (ref 0.5–1.9)
LACTIC ACID, SEPSIS: 1.6 MMOL/L (ref 0.5–1.9)
LYMPHOCYTES # BLD: 6 % (ref 24–44)
MCH RBC QN AUTO: 24 PG (ref 25.2–33.5)
MCHC RBC AUTO-ENTMCNC: 29.3 G/DL (ref 28.4–34.8)
MCV RBC AUTO: 81.9 FL (ref 82.6–102.9)
MONOCYTES # BLD: 10 % (ref 1–7)
NRBC AUTOMATED: 0 PER 100 WBC
PDW BLD-RTO: 15.7 % (ref 11.8–14.4)
PLATELET # BLD: 273 K/UL (ref 138–453)
PLATELET ESTIMATE: ABNORMAL
PMV BLD AUTO: 10.1 FL (ref 8.1–13.5)
POTASSIUM SERPL-SCNC: 3.9 MMOL/L (ref 3.7–5.3)
RBC # BLD: 3.87 M/UL (ref 4.21–5.77)
RBC # BLD: ABNORMAL 10*6/UL
SEG NEUTROPHILS: 74 % (ref 36–66)
SEGMENTED NEUTROPHILS ABSOLUTE COUNT: 6.36 K/UL (ref 1.8–7.7)
SODIUM BLD-SCNC: 136 MMOL/L (ref 135–144)
WBC # BLD: 8.6 K/UL (ref 3.5–11.3)
WBC # BLD: ABNORMAL 10*3/UL

## 2021-04-03 PROCEDURE — 87070 CULTURE OTHR SPECIMN AEROBIC: CPT

## 2021-04-03 PROCEDURE — 83605 ASSAY OF LACTIC ACID: CPT

## 2021-04-03 PROCEDURE — 73590 X-RAY EXAM OF LOWER LEG: CPT

## 2021-04-03 PROCEDURE — 1200000000 HC SEMI PRIVATE

## 2021-04-03 PROCEDURE — 99222 1ST HOSP IP/OBS MODERATE 55: CPT | Performed by: NURSE PRACTITIONER

## 2021-04-03 PROCEDURE — 96365 THER/PROPH/DIAG IV INF INIT: CPT

## 2021-04-03 PROCEDURE — 87077 CULTURE AEROBIC IDENTIFY: CPT

## 2021-04-03 PROCEDURE — 2580000003 HC RX 258: Performed by: PHYSICIAN ASSISTANT

## 2021-04-03 PROCEDURE — 85025 COMPLETE CBC W/AUTO DIFF WBC: CPT

## 2021-04-03 PROCEDURE — 87186 SC STD MICRODIL/AGAR DIL: CPT

## 2021-04-03 PROCEDURE — 36415 COLL VENOUS BLD VENIPUNCTURE: CPT

## 2021-04-03 PROCEDURE — 87040 BLOOD CULTURE FOR BACTERIA: CPT

## 2021-04-03 PROCEDURE — 99285 EMERGENCY DEPT VISIT HI MDM: CPT

## 2021-04-03 PROCEDURE — 83036 HEMOGLOBIN GLYCOSYLATED A1C: CPT

## 2021-04-03 PROCEDURE — 86403 PARTICLE AGGLUT ANTBDY SCRN: CPT

## 2021-04-03 PROCEDURE — 82947 ASSAY GLUCOSE BLOOD QUANT: CPT

## 2021-04-03 PROCEDURE — 96375 TX/PRO/DX INJ NEW DRUG ADDON: CPT

## 2021-04-03 PROCEDURE — 2580000003 HC RX 258: Performed by: NURSE PRACTITIONER

## 2021-04-03 PROCEDURE — 6360000002 HC RX W HCPCS: Performed by: PHYSICIAN ASSISTANT

## 2021-04-03 PROCEDURE — 6370000000 HC RX 637 (ALT 250 FOR IP): Performed by: NURSE PRACTITIONER

## 2021-04-03 PROCEDURE — 80048 BASIC METABOLIC PNL TOTAL CA: CPT

## 2021-04-03 PROCEDURE — 87205 SMEAR GRAM STAIN: CPT

## 2021-04-03 RX ORDER — DOXEPIN HYDROCHLORIDE 25 MG/1
100 CAPSULE ORAL NIGHTLY
Status: DISCONTINUED | OUTPATIENT
Start: 2021-04-03 | End: 2021-04-05

## 2021-04-03 RX ORDER — POTASSIUM CHLORIDE 20 MEQ/1
40 TABLET, EXTENDED RELEASE ORAL PRN
Status: DISCONTINUED | OUTPATIENT
Start: 2021-04-03 | End: 2021-04-12 | Stop reason: HOSPADM

## 2021-04-03 RX ORDER — MAGNESIUM SULFATE 1 G/100ML
1000 INJECTION INTRAVENOUS PRN
Status: DISCONTINUED | OUTPATIENT
Start: 2021-04-03 | End: 2021-04-12 | Stop reason: HOSPADM

## 2021-04-03 RX ORDER — DEXTROSE MONOHYDRATE 50 MG/ML
100 INJECTION, SOLUTION INTRAVENOUS PRN
Status: DISCONTINUED | OUTPATIENT
Start: 2021-04-03 | End: 2021-04-12 | Stop reason: HOSPADM

## 2021-04-03 RX ORDER — ONDANSETRON 2 MG/ML
4 INJECTION INTRAMUSCULAR; INTRAVENOUS EVERY 6 HOURS PRN
Status: DISCONTINUED | OUTPATIENT
Start: 2021-04-03 | End: 2021-04-12 | Stop reason: HOSPADM

## 2021-04-03 RX ORDER — SODIUM CHLORIDE 0.9 % (FLUSH) 0.9 %
10 SYRINGE (ML) INJECTION PRN
Status: DISCONTINUED | OUTPATIENT
Start: 2021-04-03 | End: 2021-04-03 | Stop reason: SDUPTHER

## 2021-04-03 RX ORDER — HYDROCODONE BITARTRATE AND ACETAMINOPHEN 5; 325 MG/1; MG/1
2 TABLET ORAL EVERY 6 HOURS PRN
Status: DISCONTINUED | OUTPATIENT
Start: 2021-04-03 | End: 2021-04-06

## 2021-04-03 RX ORDER — HYDROXYZINE HYDROCHLORIDE 25 MG/1
25 TABLET, FILM COATED ORAL 3 TIMES DAILY PRN
Status: DISCONTINUED | OUTPATIENT
Start: 2021-04-03 | End: 2021-04-12 | Stop reason: HOSPADM

## 2021-04-03 RX ORDER — HYDROCODONE BITARTRATE AND ACETAMINOPHEN 5; 325 MG/1; MG/1
1 TABLET ORAL EVERY 6 HOURS PRN
Status: DISCONTINUED | OUTPATIENT
Start: 2021-04-03 | End: 2021-04-06

## 2021-04-03 RX ORDER — ACETAMINOPHEN 325 MG/1
650 TABLET ORAL EVERY 6 HOURS PRN
Status: DISCONTINUED | OUTPATIENT
Start: 2021-04-03 | End: 2021-04-12 | Stop reason: HOSPADM

## 2021-04-03 RX ORDER — SODIUM CHLORIDE 9 MG/ML
25 INJECTION, SOLUTION INTRAVENOUS PRN
Status: DISCONTINUED | OUTPATIENT
Start: 2021-04-03 | End: 2021-04-12 | Stop reason: HOSPADM

## 2021-04-03 RX ORDER — OXYCODONE HYDROCHLORIDE AND ACETAMINOPHEN 5; 325 MG/1; MG/1
1 TABLET ORAL EVERY 4 HOURS PRN
Status: DISCONTINUED | OUTPATIENT
Start: 2021-04-03 | End: 2021-04-03

## 2021-04-03 RX ORDER — NICOTINE 21 MG/24HR
1 PATCH, TRANSDERMAL 24 HOURS TRANSDERMAL DAILY PRN
Status: DISCONTINUED | OUTPATIENT
Start: 2021-04-03 | End: 2021-04-12 | Stop reason: HOSPADM

## 2021-04-03 RX ORDER — INSULIN GLARGINE 100 [IU]/ML
25 INJECTION, SOLUTION SUBCUTANEOUS
Status: DISCONTINUED | OUTPATIENT
Start: 2021-04-03 | End: 2021-04-08

## 2021-04-03 RX ORDER — DEXTROSE MONOHYDRATE 25 G/50ML
12.5 INJECTION, SOLUTION INTRAVENOUS PRN
Status: DISCONTINUED | OUTPATIENT
Start: 2021-04-03 | End: 2021-04-12 | Stop reason: HOSPADM

## 2021-04-03 RX ORDER — SODIUM CHLORIDE 9 MG/ML
25 INJECTION, SOLUTION INTRAVENOUS PRN
Status: DISCONTINUED | OUTPATIENT
Start: 2021-04-03 | End: 2021-04-03 | Stop reason: SDUPTHER

## 2021-04-03 RX ORDER — SODIUM CHLORIDE 0.9 % (FLUSH) 0.9 %
10 SYRINGE (ML) INJECTION PRN
Status: DISCONTINUED | OUTPATIENT
Start: 2021-04-03 | End: 2021-04-12 | Stop reason: HOSPADM

## 2021-04-03 RX ORDER — ACETAMINOPHEN 650 MG/1
650 SUPPOSITORY RECTAL EVERY 6 HOURS PRN
Status: DISCONTINUED | OUTPATIENT
Start: 2021-04-03 | End: 2021-04-12 | Stop reason: HOSPADM

## 2021-04-03 RX ORDER — POLYETHYLENE GLYCOL 3350 17 G/17G
17 POWDER, FOR SOLUTION ORAL DAILY PRN
Status: DISCONTINUED | OUTPATIENT
Start: 2021-04-03 | End: 2021-04-12 | Stop reason: HOSPADM

## 2021-04-03 RX ORDER — ONDANSETRON 2 MG/ML
4 INJECTION INTRAMUSCULAR; INTRAVENOUS ONCE
Status: COMPLETED | OUTPATIENT
Start: 2021-04-03 | End: 2021-04-03

## 2021-04-03 RX ORDER — HYDROCODONE BITARTRATE AND ACETAMINOPHEN 5; 325 MG/1; MG/1
2 TABLET ORAL ONCE
Status: COMPLETED | OUTPATIENT
Start: 2021-04-04 | End: 2021-04-04

## 2021-04-03 RX ORDER — SODIUM CHLORIDE 9 MG/ML
INJECTION, SOLUTION INTRAVENOUS CONTINUOUS
Status: DISCONTINUED | OUTPATIENT
Start: 2021-04-03 | End: 2021-04-07

## 2021-04-03 RX ORDER — HYDROXYZINE PAMOATE 25 MG/1
25 CAPSULE ORAL 3 TIMES DAILY PRN
Status: DISCONTINUED | OUTPATIENT
Start: 2021-04-03 | End: 2021-04-03 | Stop reason: CLARIF

## 2021-04-03 RX ORDER — FAMOTIDINE 20 MG/1
20 TABLET, FILM COATED ORAL 2 TIMES DAILY
Status: DISCONTINUED | OUTPATIENT
Start: 2021-04-03 | End: 2021-04-12 | Stop reason: HOSPADM

## 2021-04-03 RX ORDER — PROMETHAZINE HYDROCHLORIDE 12.5 MG/1
12.5 TABLET ORAL EVERY 6 HOURS PRN
Status: DISCONTINUED | OUTPATIENT
Start: 2021-04-03 | End: 2021-04-12 | Stop reason: HOSPADM

## 2021-04-03 RX ORDER — NICOTINE POLACRILEX 4 MG
15 LOZENGE BUCCAL PRN
Status: DISCONTINUED | OUTPATIENT
Start: 2021-04-03 | End: 2021-04-12 | Stop reason: HOSPADM

## 2021-04-03 RX ORDER — SODIUM CHLORIDE 0.9 % (FLUSH) 0.9 %
10 SYRINGE (ML) INJECTION EVERY 12 HOURS SCHEDULED
Status: DISCONTINUED | OUTPATIENT
Start: 2021-04-03 | End: 2021-04-12 | Stop reason: HOSPADM

## 2021-04-03 RX ORDER — AMITRIPTYLINE HYDROCHLORIDE 25 MG/1
75 TABLET, FILM COATED ORAL NIGHTLY
Status: DISCONTINUED | OUTPATIENT
Start: 2021-04-03 | End: 2021-04-12 | Stop reason: HOSPADM

## 2021-04-03 RX ORDER — MORPHINE SULFATE 4 MG/ML
4 INJECTION, SOLUTION INTRAMUSCULAR; INTRAVENOUS ONCE
Status: COMPLETED | OUTPATIENT
Start: 2021-04-03 | End: 2021-04-03

## 2021-04-03 RX ORDER — SODIUM CHLORIDE 0.9 % (FLUSH) 0.9 %
10 SYRINGE (ML) INJECTION EVERY 12 HOURS SCHEDULED
Status: DISCONTINUED | OUTPATIENT
Start: 2021-04-03 | End: 2021-04-03 | Stop reason: SDUPTHER

## 2021-04-03 RX ORDER — POTASSIUM CHLORIDE 7.45 MG/ML
10 INJECTION INTRAVENOUS PRN
Status: DISCONTINUED | OUTPATIENT
Start: 2021-04-03 | End: 2021-04-12 | Stop reason: HOSPADM

## 2021-04-03 RX ADMIN — PROBIOTIC PRODUCT - TAB 1 TABLET: TAB at 22:02

## 2021-04-03 RX ADMIN — SODIUM CHLORIDE: 9 INJECTION, SOLUTION INTRAVENOUS at 22:07

## 2021-04-03 RX ADMIN — FAMOTIDINE 20 MG: 20 TABLET ORAL at 22:02

## 2021-04-03 RX ADMIN — VANCOMYCIN HYDROCHLORIDE 1750 MG: 1 INJECTION, POWDER, LYOPHILIZED, FOR SOLUTION INTRAVENOUS at 20:32

## 2021-04-03 RX ADMIN — INSULIN LISPRO 2 UNITS: 100 INJECTION, SOLUTION INTRAVENOUS; SUBCUTANEOUS at 22:03

## 2021-04-03 RX ADMIN — PIPERACILLIN SODIUM AND TAZOBACTAM SODIUM 4500 MG: 4; .5 INJECTION, POWDER, LYOPHILIZED, FOR SOLUTION INTRAVENOUS at 19:49

## 2021-04-03 RX ADMIN — INSULIN GLARGINE 25 UNITS: 100 INJECTION, SOLUTION SUBCUTANEOUS at 22:02

## 2021-04-03 RX ADMIN — MORPHINE SULFATE 4 MG: 4 INJECTION, SOLUTION INTRAMUSCULAR; INTRAVENOUS at 19:49

## 2021-04-03 RX ADMIN — ONDANSETRON 4 MG: 2 INJECTION INTRAMUSCULAR; INTRAVENOUS at 19:49

## 2021-04-03 RX ADMIN — APIXABAN 5 MG: 5 TABLET, FILM COATED ORAL at 22:37

## 2021-04-03 RX ADMIN — HYDROXYZINE HYDROCHLORIDE 25 MG: 25 TABLET, FILM COATED ORAL at 22:02

## 2021-04-03 RX ADMIN — AMITRIPTYLINE HYDROCHLORIDE 75 MG: 25 TABLET, FILM COATED ORAL at 22:37

## 2021-04-03 RX ADMIN — SODIUM CHLORIDE, PRESERVATIVE FREE 10 ML: 5 INJECTION INTRAVENOUS at 22:08

## 2021-04-03 ASSESSMENT — PAIN DESCRIPTION - FREQUENCY: FREQUENCY: CONTINUOUS

## 2021-04-03 ASSESSMENT — PAIN SCALES - GENERAL: PAINLEVEL_OUTOF10: 9

## 2021-04-03 ASSESSMENT — PAIN DESCRIPTION - LOCATION: LOCATION: LEG

## 2021-04-03 ASSESSMENT — ENCOUNTER SYMPTOMS
EYES NEGATIVE: 1
COLOR CHANGE: 1
ALLERGIC/IMMUNOLOGIC NEGATIVE: 1
BACK PAIN: 0
RESPIRATORY NEGATIVE: 1
GASTROINTESTINAL NEGATIVE: 1

## 2021-04-03 ASSESSMENT — PAIN DESCRIPTION - PROGRESSION: CLINICAL_PROGRESSION: NOT CHANGED

## 2021-04-03 ASSESSMENT — PAIN DESCRIPTION - DESCRIPTORS: DESCRIPTORS: SHARP;PINS AND NEEDLES

## 2021-04-04 LAB
AMPHETAMINE SCREEN URINE: NEGATIVE
ANION GAP SERPL CALCULATED.3IONS-SCNC: 10 MMOL/L (ref 9–17)
BARBITURATE SCREEN URINE: NEGATIVE
BENZODIAZEPINE SCREEN, URINE: NEGATIVE
BUN BLDV-MCNC: 15 MG/DL (ref 8–23)
BUN/CREAT BLD: 13 (ref 9–20)
BUPRENORPHINE URINE: ABNORMAL
CALCIUM SERPL-MCNC: 8.3 MG/DL (ref 8.6–10.4)
CANNABINOID SCREEN URINE: NEGATIVE
CHLORIDE BLD-SCNC: 100 MMOL/L (ref 98–107)
CO2: 25 MMOL/L (ref 20–31)
COCAINE METABOLITE, URINE: NEGATIVE
CREAT SERPL-MCNC: 1.17 MG/DL (ref 0.7–1.2)
GFR AFRICAN AMERICAN: >60 ML/MIN
GFR NON-AFRICAN AMERICAN: >60 ML/MIN
GFR SERPL CREATININE-BSD FRML MDRD: ABNORMAL ML/MIN/{1.73_M2}
GFR SERPL CREATININE-BSD FRML MDRD: ABNORMAL ML/MIN/{1.73_M2}
GLUCOSE BLD-MCNC: 226 MG/DL (ref 75–110)
GLUCOSE BLD-MCNC: 289 MG/DL (ref 75–110)
GLUCOSE BLD-MCNC: 320 MG/DL (ref 75–110)
GLUCOSE BLD-MCNC: 333 MG/DL (ref 70–99)
HCT VFR BLD CALC: 31 % (ref 40.7–50.3)
HEMOGLOBIN: 8.9 G/DL (ref 13–17)
MCH RBC QN AUTO: 23.7 PG (ref 25.2–33.5)
MCHC RBC AUTO-ENTMCNC: 28.7 G/DL (ref 28.4–34.8)
MCV RBC AUTO: 82.7 FL (ref 82.6–102.9)
MDMA URINE: ABNORMAL
METHADONE SCREEN, URINE: NEGATIVE
METHAMPHETAMINE, URINE: ABNORMAL
NRBC AUTOMATED: 0 PER 100 WBC
OPIATES, URINE: POSITIVE
OXYCODONE SCREEN URINE: POSITIVE
PDW BLD-RTO: 15.6 % (ref 11.8–14.4)
PHENCYCLIDINE, URINE: NEGATIVE
PLATELET # BLD: 280 K/UL (ref 138–453)
PMV BLD AUTO: 10.5 FL (ref 8.1–13.5)
POTASSIUM SERPL-SCNC: 3.6 MMOL/L (ref 3.7–5.3)
PROPOXYPHENE, URINE: ABNORMAL
RBC # BLD: 3.75 M/UL (ref 4.21–5.77)
SODIUM BLD-SCNC: 135 MMOL/L (ref 135–144)
TEST INFORMATION: ABNORMAL
TRICYCLIC ANTIDEPRESSANTS, UR: ABNORMAL
WBC # BLD: 7.4 K/UL (ref 3.5–11.3)

## 2021-04-04 PROCEDURE — 99222 1ST HOSP IP/OBS MODERATE 55: CPT | Performed by: INTERNAL MEDICINE

## 2021-04-04 PROCEDURE — 2580000003 HC RX 258: Performed by: INTERNAL MEDICINE

## 2021-04-04 PROCEDURE — 1200000000 HC SEMI PRIVATE

## 2021-04-04 PROCEDURE — 80307 DRUG TEST PRSMV CHEM ANLYZR: CPT

## 2021-04-04 PROCEDURE — 6370000000 HC RX 637 (ALT 250 FOR IP): Performed by: NURSE PRACTITIONER

## 2021-04-04 PROCEDURE — 6360000002 HC RX W HCPCS: Performed by: NURSE PRACTITIONER

## 2021-04-04 PROCEDURE — 99213 OFFICE O/P EST LOW 20 MIN: CPT

## 2021-04-04 PROCEDURE — 6360000002 HC RX W HCPCS: Performed by: INTERNAL MEDICINE

## 2021-04-04 PROCEDURE — 80048 BASIC METABOLIC PNL TOTAL CA: CPT

## 2021-04-04 PROCEDURE — 82947 ASSAY GLUCOSE BLOOD QUANT: CPT

## 2021-04-04 PROCEDURE — 99232 SBSQ HOSP IP/OBS MODERATE 35: CPT | Performed by: INTERNAL MEDICINE

## 2021-04-04 PROCEDURE — 2580000003 HC RX 258: Performed by: NURSE PRACTITIONER

## 2021-04-04 PROCEDURE — 36415 COLL VENOUS BLD VENIPUNCTURE: CPT

## 2021-04-04 PROCEDURE — 85027 COMPLETE CBC AUTOMATED: CPT

## 2021-04-04 RX ORDER — TRAMADOL HYDROCHLORIDE 50 MG/1
50 TABLET ORAL ONCE
Status: COMPLETED | OUTPATIENT
Start: 2021-04-04 | End: 2021-04-04

## 2021-04-04 RX ADMIN — FAMOTIDINE 20 MG: 20 TABLET ORAL at 20:40

## 2021-04-04 RX ADMIN — SODIUM CHLORIDE 3000 MG: 900 INJECTION INTRAVENOUS at 17:34

## 2021-04-04 RX ADMIN — SODIUM CHLORIDE: 9 INJECTION, SOLUTION INTRAVENOUS at 15:10

## 2021-04-04 RX ADMIN — PIPERACILLIN AND TAZOBACTAM 3375 MG: 3; .375 INJECTION, POWDER, LYOPHILIZED, FOR SOLUTION INTRAVENOUS at 02:25

## 2021-04-04 RX ADMIN — FAMOTIDINE 20 MG: 20 TABLET ORAL at 08:12

## 2021-04-04 RX ADMIN — INSULIN LISPRO 4 UNITS: 100 INJECTION, SOLUTION INTRAVENOUS; SUBCUTANEOUS at 16:59

## 2021-04-04 RX ADMIN — TRAMADOL HYDROCHLORIDE 50 MG: 50 TABLET, FILM COATED ORAL at 20:40

## 2021-04-04 RX ADMIN — VANCOMYCIN HYDROCHLORIDE 1000 MG: 1 INJECTION, POWDER, LYOPHILIZED, FOR SOLUTION INTRAVENOUS at 20:40

## 2021-04-04 RX ADMIN — APIXABAN 5 MG: 5 TABLET, FILM COATED ORAL at 08:12

## 2021-04-04 RX ADMIN — APIXABAN 5 MG: 5 TABLET, FILM COATED ORAL at 20:40

## 2021-04-04 RX ADMIN — HYDROCODONE BITARTRATE AND ACETAMINOPHEN 2 TABLET: 5; 325 TABLET ORAL at 10:36

## 2021-04-04 RX ADMIN — PROBIOTIC PRODUCT - TAB 1 TABLET: TAB at 08:12

## 2021-04-04 RX ADMIN — PROBIOTIC PRODUCT - TAB 1 TABLET: TAB at 20:40

## 2021-04-04 RX ADMIN — VANCOMYCIN HYDROCHLORIDE 1000 MG: 1 INJECTION, POWDER, LYOPHILIZED, FOR SOLUTION INTRAVENOUS at 08:08

## 2021-04-04 RX ADMIN — INSULIN LISPRO 4 UNITS: 100 INJECTION, SOLUTION INTRAVENOUS; SUBCUTANEOUS at 08:06

## 2021-04-04 RX ADMIN — INSULIN GLARGINE 25 UNITS: 100 INJECTION, SOLUTION SUBCUTANEOUS at 16:59

## 2021-04-04 RX ADMIN — HYDROCODONE BITARTRATE AND ACETAMINOPHEN 2 TABLET: 5; 325 TABLET ORAL at 21:13

## 2021-04-04 RX ADMIN — HYDROCODONE BITARTRATE AND ACETAMINOPHEN 2 TABLET: 5; 325 TABLET ORAL at 00:07

## 2021-04-04 RX ADMIN — PIPERACILLIN AND TAZOBACTAM 3375 MG: 3; .375 INJECTION, POWDER, LYOPHILIZED, FOR SOLUTION INTRAVENOUS at 09:55

## 2021-04-04 RX ADMIN — AMITRIPTYLINE HYDROCHLORIDE 75 MG: 25 TABLET, FILM COATED ORAL at 20:40

## 2021-04-04 RX ADMIN — INSULIN LISPRO 2 UNITS: 100 INJECTION, SOLUTION INTRAVENOUS; SUBCUTANEOUS at 11:54

## 2021-04-04 RX ADMIN — HYDROCODONE BITARTRATE AND ACETAMINOPHEN 2 TABLET: 5; 325 TABLET ORAL at 15:08

## 2021-04-04 RX ADMIN — INSULIN LISPRO 2 UNITS: 100 INJECTION, SOLUTION INTRAVENOUS; SUBCUTANEOUS at 20:40

## 2021-04-04 ASSESSMENT — PAIN DESCRIPTION - ORIENTATION
ORIENTATION: LEFT

## 2021-04-04 ASSESSMENT — PAIN DESCRIPTION - DESCRIPTORS
DESCRIPTORS: SHARP;SHOOTING
DESCRIPTORS: SORE
DESCRIPTORS: SORE
DESCRIPTORS: ACHING

## 2021-04-04 ASSESSMENT — PAIN DESCRIPTION - LOCATION
LOCATION: LEG

## 2021-04-04 ASSESSMENT — PAIN - FUNCTIONAL ASSESSMENT
PAIN_FUNCTIONAL_ASSESSMENT: ACTIVITIES ARE NOT PREVENTED

## 2021-04-04 ASSESSMENT — PAIN SCALES - GENERAL
PAINLEVEL_OUTOF10: 8
PAINLEVEL_OUTOF10: 9
PAINLEVEL_OUTOF10: 9

## 2021-04-04 ASSESSMENT — PAIN DESCRIPTION - ONSET: ONSET: ON-GOING

## 2021-04-04 ASSESSMENT — PAIN DESCRIPTION - FREQUENCY
FREQUENCY: CONTINUOUS
FREQUENCY: CONTINUOUS

## 2021-04-04 ASSESSMENT — PAIN DESCRIPTION - PAIN TYPE: TYPE: ACUTE PAIN

## 2021-04-04 NOTE — ED TRIAGE NOTES
Patient presents to ED with complaints of pain and redness to left BKA site. Patient had amputation done 2/9 per Dr Travis Daugherty. States that he has had increased pain since. Has home care nurses that come to house daily for dressing changes. Nurse recommended he come in due to increased redness and drainage at site.
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
## 2021-04-04 NOTE — ED PROVIDER NOTES
The patient was seen and examined by me in conjunction with the mid-level provider. I agree with his/her assessment and treatment plan. The patient has cellulitis of his leg and he is being admitted for IV antibiotics.      Russell Chowdhury MD  04/03/21 2003
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
## 2021-04-04 NOTE — ACP (ADVANCE CARE PLANNING)
Discharge planning    Attempted to complete ACP but patient very lethargic. . will attempt at later time.

## 2021-04-04 NOTE — CARE COORDINATION
Case Management Initial Discharge Plan  Syd Sanches,         Readmission Risk              Risk of Unplanned Readmission:        74             Met with:patient to discuss discharge plans. Information verified: address, contacts, phone number, , insurance Yes  PCP: Sophia Scott DO  Date of last visit:  3/19      Insurance Provider: [employer]are     Discharge Planning  Current Residence:  Private home  Living Arrangements:  Alone . Dari Bond is staying with him temporarily       Home has 1 stories/0 stairs to climb  Support Systems:  Children, Family Members       Current Services PTA:  Home care  Agency:  [place] [facility]       Patient able to perform ADL's:Assisted  DME in home:  [employer], w/c, shower chair, walker   DME used to aid ambulation prior to admission:   Wheelchair   DME used during admission:  tbd     Potential Assistance Needed:  N/A    [facility]    Potential Assistance Purchasing Medications:  No  Does patient want to participate in local refill/ meds to beds program?   no    Patient agreeable to home care: Yes  Freedom of choice provided:  yes     The Plan for Transition of Care is related to the following treatment goals: continuation of home care, skilled RN and therapy    The Patient and/or patient representative   was provided with a choice of provider and agrees   with the discharge plan. [x] Yes [] No    Freedom of choice list was provided with basic dialogue that supports the patient's individualized plan of care/goals, treatment preferences and shares the quality data associated with the providers.  [x] Yes [] No      Type of Home Care Services:  None  Patient expects to be discharged to:      Prior SNF/Rehab Placement and Facility: yes to [facility] rehab in past   Agreeable to SNF/Rehab: Yes  [place] of choice provided: yes   Evaluation: yes    Expected Discharge date:  21  Follow Up Appointment: Best Day/ Time:   any Transportation provider: depends on final plan   Transportation arrangements needed for discharge: Yes    Discharge Plan:   Met with patient to follow up on plan of care. Patient very lethargic on assessment. Barber Lopez would answer questions then close eyes. . he will need to be re evaluated when more awake. Barber Lopez He was here 2/8-2/12 with a L BKA on 2/9. He is prior R BKA. He was transferred to [facility] rehab and was sent home with [facility]. Called and spoke with office and they confirmed they see him for all services. Patient stated his cousin kamilla has been staying with him to assist him after rehab. He does have right prosthetic and uses it but states more difficult lately. He uses his slide board to get in and out of his wheelchair. Patient would benefit from therapy evaluation (not ordered as of yet) . Admitted with cellulitis and on IV atb. Vascular to see patient also. LOREE in epic. Will need follow up. .      Electronically signed by Patrick Yang RN on 4/4/21 at 9:53 AM EDT
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
## 2021-04-04 NOTE — CONSULTS
Pharmacy Note  Vancomycin Consult - Initial Note     Marko Leyva is a 63 y.o. male ordered Vancomycin. .  Current diagnosis for which MRSA is suspected/confirmed: cellulitis  Vancomycin order/consult received from Dr. Partha Dennison .     Additional antimicrobials:  Zosyn    Patient Active Problem List   Diagnosis    Type 2 diabetes mellitus with diabetic polyneuropathy, with long-term current use of insulin (HCC)    Neuropathic pain, leg    Diabetic polyneuropathy (HCC)    Allergic rhinitis    Tobacco dependence    Edentulous    Dysphagia    Lung nodules    Esophageal cancer (HCC)    Fracture of humerus, left, closed    Closed fracture of humerus    DM type 2 with diabetic peripheral neuropathy (HCC)    Chronic obstructive pulmonary disease (HCC)    HLD (hyperlipidemia)    Gastroesophageal reflux disease    Chronic pain syndrome    Marijuana use    History of colon polyps    Functional diarrhea    Sepsis due to methicillin resistant Staphylococcus aureus (MRSA) (HCC)    History of esophageal cancer    Osteomyelitis, chronic, ankle or foot ([address]    PAD (peripheral artery disease) ([address]    Other pulmonary embolism without acute cor pulmonale (HCC)    Carotid stenosis, asymptomatic, bilateral    Lower limb amputation status    Fx humeral neck, right, closed, initial encounter    Transient hypotension    Constipation due to opioid therapy    Acute kidney injury ([address]    Diabetic ulcer of left midfoot associated with type 2 diabetes mellitus, with fat layer exposed ([address]    Complication of below knee amputation stump (HCC)    COPD exacerbation (HCC)    Hyponatremia    Pain, phantom limb ([address]    Below-knee amputation of right lower extremity (HCC)    Moderate protein malnutrition ([address]    Essential hypertension    Uncontrolled type 2 diabetes mellitus with hyperglycemia ([address]    History of pulmonary embolism - 2017    Atelectasis    Uncontrolled type 2 diabetes mellitus with foot ulcer (HCC)    Azotemia    Anemia, normocytic normochromic    Drug-induced constipation    Osteomyelitis of metatarsals of left foot (HCC)    Diabetic foot infection ([address]    Noncompliance    Type 1 diabetes mellitus with diabetic foot infection ([address]    Acute osteomyelitis of left foot (HCC)    Cellulitis of left foot    Mild malnutrition ([address]    Diabetic infection of left foot (HCC)    Hypocalcemia    Hypokalemia    Moderate malnutrition ([address]    Diabetic ulcer of left midfoot associated with type 2 diabetes mellitus, with necrosis of bone ([address]    History of below knee amputation, right ([address]    Foot ulcer with fat layer exposed, left ([address]    Chronic refractory osteomyelitis of left foot ([address]    Sepsis ([address]    Pyogenic inflammation of bone ([address]    Cellulitis       Height:     Wt Readings from Last 1 Encounters:   04/03/21 145 lb 6.4 oz (66 kg)     Allergies:  Gabapentin       No results found for: PROCAL  Recent Labs     04/03/21 1950   BUN 14     Recent Labs     04/03/21 1950   CREATININE 0.85     Recent Labs     04/03/21 1950   WBC 8.6       Intake/Output Summary (Last 24 hours) at 4/3/2021 2248  Last data filed at 4/3/2021 2034  Gross per 24 hour   Intake 100 ml   Output --   Net 100 ml       Temp: 98.2 F    Culture Date / Source  /  Results  4/3/21   blood x2   in process  4/3/21   wound   in process    Actual Weight:    Wt Readings from Last 1 Encounters:   04/03/21 145 lb 6.4 oz (66 kg)     CrCl based on IBW\"  83 ml/min        PLAN   Initial loading dose of 25mg/kg (max of 2500 mg) = 1750  mg   2. Vancomycin 1000 mg IV every 12 hours. 3.   Ensured BUN/sCr ordered at baseline and at least every 3rd day. 4.   ONLY for suspected pneumonia or COPD: MRSA nasal swab  is not ordered. Non respiratory infection. .    5. Trough ordered for: 4/5/21 @0800 . Trough Goals (Non-dialysis patient) Peaks are not routinely recommended.   10-20 mcg/mL for mild skin/soft tissue infections or UTI.  15-20 mcg/mL is the target trough for all other indications that MRSA infection is suspected. TROUGH TIMING: (Additional levels drawn based on renal function and/or clinical response). Dosing interval Timing of Trough   Every 8 hr, 12 hr, or 18 hr regimen Prior to the 4th dose  Twice weekly troughs for every 8 hour dosing   Every 24 hr regimen Prior to the 3rd or 4th dose   Every 36 hr regimen Prior to the 3rd dose           Thank you for the consult. Pharmacy will continue to follow.   RUTH SILVESTRE RPh/PharmJOSE  4/3/2021  10:48 PM

## 2021-04-04 NOTE — PROGRESS NOTES
Day of Therapy:2  Current Dose:vancomycin 1000mg q 12 h. Crcl has decreased. One more dose to be given before trough drawn in am.   Culture Results           Blood:pending            Sputum:           Wound:           Urine: Other:  Temp Readings from Last 3 Encounters:   04/04/21 98.2 °F (36.8 °C) (Oral)   03/29/21 98.3 °F (36.8 °C) (Oral)   03/13/21 97.9 °F (36.6 °C) (Oral)     Recent Labs     04/03/21  1950 04/04/21  0619   WBC 8.6 7.4     Recent Labs     04/03/21  1950 04/04/21  0619   CREATININE 0.85 1.17     Estimated Creatinine Clearance: 59 mL/min (based on SCr of 1.17 mg/dL).     Intake/Output Summary (Last 24 hours) at 4/4/2021 1117  Last data filed at 4/3/2021 2034  Gross per 24 hour   Intake 100 ml   Output --   Net 100 ml

## 2021-04-05 ENCOUNTER — APPOINTMENT (OUTPATIENT)
Dept: MRI IMAGING | Age: 66
DRG: 501 | End: 2021-04-05
Payer: MEDICARE

## 2021-04-05 LAB
C-REACTIVE PROTEIN: 91.6 MG/L (ref 0–5)
GLUCOSE BLD-MCNC: 149 MG/DL (ref 75–110)
GLUCOSE BLD-MCNC: 154 MG/DL (ref 75–110)
GLUCOSE BLD-MCNC: 168 MG/DL (ref 75–110)
GLUCOSE BLD-MCNC: 208 MG/DL (ref 75–110)
GLUCOSE BLD-MCNC: 326 MG/DL (ref 75–110)
SEDIMENTATION RATE, ERYTHROCYTE: 61 MM (ref 0–20)
VANCOMYCIN TROUGH DATE LAST DOSE: NORMAL
VANCOMYCIN TROUGH DOSE AMOUNT: NORMAL
VANCOMYCIN TROUGH TIME LAST DOSE: NORMAL
VANCOMYCIN TROUGH: 13.9 UG/ML (ref 10–20)

## 2021-04-05 PROCEDURE — 99232 SBSQ HOSP IP/OBS MODERATE 35: CPT | Performed by: INTERNAL MEDICINE

## 2021-04-05 PROCEDURE — 82947 ASSAY GLUCOSE BLOOD QUANT: CPT

## 2021-04-05 PROCEDURE — 73720 MRI LWR EXTREMITY W/O&W/DYE: CPT

## 2021-04-05 PROCEDURE — 6370000000 HC RX 637 (ALT 250 FOR IP): Performed by: NURSE PRACTITIONER

## 2021-04-05 PROCEDURE — 86140 C-REACTIVE PROTEIN: CPT

## 2021-04-05 PROCEDURE — A9579 GAD-BASE MR CONTRAST NOS,1ML: HCPCS | Performed by: INTERNAL MEDICINE

## 2021-04-05 PROCEDURE — 2580000003 HC RX 258: Performed by: NURSE PRACTITIONER

## 2021-04-05 PROCEDURE — 6360000004 HC RX CONTRAST MEDICATION: Performed by: INTERNAL MEDICINE

## 2021-04-05 PROCEDURE — 99024 POSTOP FOLLOW-UP VISIT: CPT | Performed by: SURGERY

## 2021-04-05 PROCEDURE — 1200000000 HC SEMI PRIVATE

## 2021-04-05 PROCEDURE — 2580000003 HC RX 258: Performed by: INTERNAL MEDICINE

## 2021-04-05 PROCEDURE — 85652 RBC SED RATE AUTOMATED: CPT

## 2021-04-05 PROCEDURE — 80202 ASSAY OF VANCOMYCIN: CPT

## 2021-04-05 PROCEDURE — 6360000002 HC RX W HCPCS: Performed by: INTERNAL MEDICINE

## 2021-04-05 PROCEDURE — 36415 COLL VENOUS BLD VENIPUNCTURE: CPT

## 2021-04-05 RX ORDER — SODIUM CHLORIDE 0.9 % (FLUSH) 0.9 %
10 SYRINGE (ML) INJECTION
Status: COMPLETED | OUTPATIENT
Start: 2021-04-05 | End: 2021-04-05

## 2021-04-05 RX ADMIN — INSULIN LISPRO 1 UNITS: 100 INJECTION, SOLUTION INTRAVENOUS; SUBCUTANEOUS at 11:45

## 2021-04-05 RX ADMIN — SODIUM CHLORIDE 3000 MG: 900 INJECTION INTRAVENOUS at 00:16

## 2021-04-05 RX ADMIN — FAMOTIDINE 20 MG: 20 TABLET ORAL at 21:52

## 2021-04-05 RX ADMIN — PROBIOTIC PRODUCT - TAB 1 TABLET: TAB at 21:52

## 2021-04-05 RX ADMIN — INSULIN LISPRO 1 UNITS: 100 INJECTION, SOLUTION INTRAVENOUS; SUBCUTANEOUS at 21:53

## 2021-04-05 RX ADMIN — HYDROCODONE BITARTRATE AND ACETAMINOPHEN 2 TABLET: 5; 325 TABLET ORAL at 21:52

## 2021-04-05 RX ADMIN — HYDROCODONE BITARTRATE AND ACETAMINOPHEN 2 TABLET: 5; 325 TABLET ORAL at 05:28

## 2021-04-05 RX ADMIN — APIXABAN 5 MG: 5 TABLET, FILM COATED ORAL at 09:55

## 2021-04-05 RX ADMIN — APIXABAN 5 MG: 5 TABLET, FILM COATED ORAL at 21:52

## 2021-04-05 RX ADMIN — POLYETHYLENE GLYCOL 3350 17 G: 17 POWDER, FOR SOLUTION ORAL at 13:06

## 2021-04-05 RX ADMIN — PROBIOTIC PRODUCT - TAB 1 TABLET: TAB at 09:54

## 2021-04-05 RX ADMIN — INSULIN LISPRO 1 UNITS: 100 INJECTION, SOLUTION INTRAVENOUS; SUBCUTANEOUS at 17:42

## 2021-04-05 RX ADMIN — GADOTERIDOL 14 ML: 279.3 INJECTION, SOLUTION INTRAVENOUS at 16:06

## 2021-04-05 RX ADMIN — SODIUM CHLORIDE 3000 MG: 900 INJECTION INTRAVENOUS at 05:28

## 2021-04-05 RX ADMIN — Medication 10 ML: at 16:08

## 2021-04-05 RX ADMIN — INSULIN LISPRO 1 UNITS: 100 INJECTION, SOLUTION INTRAVENOUS; SUBCUTANEOUS at 09:54

## 2021-04-05 RX ADMIN — AMITRIPTYLINE HYDROCHLORIDE 75 MG: 25 TABLET, FILM COATED ORAL at 21:52

## 2021-04-05 RX ADMIN — SODIUM CHLORIDE: 9 INJECTION, SOLUTION INTRAVENOUS at 09:58

## 2021-04-05 RX ADMIN — FAMOTIDINE 20 MG: 20 TABLET ORAL at 09:55

## 2021-04-05 RX ADMIN — VANCOMYCIN HYDROCHLORIDE 1000 MG: 1 INJECTION, POWDER, LYOPHILIZED, FOR SOLUTION INTRAVENOUS at 11:46

## 2021-04-05 RX ADMIN — INSULIN GLARGINE 25 UNITS: 100 INJECTION, SOLUTION SUBCUTANEOUS at 17:42

## 2021-04-05 RX ADMIN — CEFEPIME HYDROCHLORIDE 2000 MG: 2 INJECTION, POWDER, FOR SOLUTION INTRAVENOUS at 17:43

## 2021-04-05 RX ADMIN — SODIUM CHLORIDE 3000 MG: 900 INJECTION INTRAVENOUS at 13:06

## 2021-04-05 RX ADMIN — HYDROCODONE BITARTRATE AND ACETAMINOPHEN 2 TABLET: 5; 325 TABLET ORAL at 11:46

## 2021-04-05 ASSESSMENT — PAIN DESCRIPTION - LOCATION: LOCATION: LEG

## 2021-04-05 ASSESSMENT — PAIN DESCRIPTION - DESCRIPTORS: DESCRIPTORS: ACHING;DISCOMFORT

## 2021-04-05 ASSESSMENT — PAIN SCALES - GENERAL
PAINLEVEL_OUTOF10: 8
PAINLEVEL_OUTOF10: 8

## 2021-04-05 ASSESSMENT — PAIN DESCRIPTION - FREQUENCY: FREQUENCY: CONTINUOUS
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
## 2021-04-05 NOTE — PROGRESS NOTES
Physician Progress Note      PATIENT:               Chaparrita Banegas  CSN #:                  [id number]  :                       1955  ADMIT DATE:       4/3/2021 7:18 PM  [address] [facility] [place] DATE:  RESPONDING  PROVIDER #:        Sherry Sainz MD          QUERY TEXT:    Pt admitted with Left BKA site cellulitis. Pt noted to have DM type 2   uncontrolled. If possible, please document in progress notes and discharge   summary the etiology of cellulitis. The medical record reflects the following:  Risk Factors: uncontrolled DM2  A1C 13.4, Noncompliance with insulin for   outpatient PCP office note,  Lt BKA,  Clinical Indicators: leg pain, left stump cellulitis, per wound note: The wound   perimeter is erythemic, swollen, warm to touch. The BKA stump feels boggy. Per ID note: Left below-knee amputation stump infected wounds/cellulitis rule   out underlying osteomyelitis. Treatment: IV vanco, unasyn, ID and wound consulted, pressure controlled   mattress, glucose monitoring and insulin coverage. Options provided:  -- Left BKA site cellulitis associated with Diabetes and not a post operative   complication of BKA  -- Left BKA site cellulitis as a postoperative complication unrelated to   Diabetes  -- Left BKA site cellulitis is a postoperative complication and is related to   Diabetes  -- Other - I will add my own diagnosis  -- Disagree - Not applicable / Not valid  -- Disagree - Clinically unable to determine / Unknown  -- Refer to Clinical Documentation Reviewer    PROVIDER RESPONSE TEXT:    Left BKA site cellulitis associated with Diabetes and not a post operative   complication of BKA.     Query created by: Nicolasa Johnson on 2021 11:08 AM      Electronically signed by:  Sherry Sainz MD 2021 12:01 PM

## 2021-04-05 NOTE — FLOWSHEET NOTE
Patient was sleeping as writer entered the room (no family members present). Writer provided a silent prayer of healing, comfort, and rest during his stay. Spiritual care will follow up as needed or requested.      04/05/21 0925   Encounter Summary   Services provided to: Patient   Referral/Consult From: [facility]   (4/5/2021 Patient Sleeping)   Complexity of Encounter Low   Length of Encounter 15 minutes   Routine   Type Initial   Assessment Sleeping   Intervention Prayer

## 2021-04-05 NOTE — PROGRESS NOTES
Pharmacy Note  Vancomycin Consult - Follow Up     Vancomycin Therapy Day: 3  Current Dosing: vancomycin 1000mg ivpb q12h   Current diagnosis for which MRSA is suspected/confirmed: cellulitis  ONLY for suspected pneumonia or COPD: MRSA nasal swab   N/A: Non respiratory infection. .      Temp: 98.2    Actual Weight:   Wt Readings from Last 1 Encounters:   04/05/21 141 lb 8 oz (64.2 kg)       Recent Labs     04/03/21  1950 04/04/21  0619   CREATININE 0.85 1.17     Calculate CrCl based on IBW: 59    Recent Labs     04/03/21  1950 04/04/21  0619   WBC 8.6 7.4         Intake/Output Summary (Last 24 hours) at 4/5/2021 1039  Last data filed at 4/5/2021 0530  Gross per 24 hour   Intake 857.92 ml   Output 1950 ml   Net -1092.08 ml           ASSESSMENT/PLAN    Vancomycin trough 13.9 today which is therapeutic. Continue vancomycin 1000mg ivpb q12h. Thank you for the consult. Will Continue to follow.   Geovani Garcia RPh  4/5/2021  10:39 AM

## 2021-04-05 NOTE — PLAN OF CARE
Problem: Falls - Risk of:  Goal: Will remain free from falls  Description: Will remain free from falls  4/5/2021 0039 by Sophia Scott RN  Outcome: Ongoing     Problem: Falls - Risk of:  Goal: Will remain free from falls  Description: Will remain free from falls  4/5/2021 0038 by Sophia Scott RN  Note: Siderails up x 2  Hourly rounding  Call light in reach  Instructed to call for assist before attempting out of bed. Remains free from falls and accidental injury at this time   Floor free from obstacles  Bed is locked and in lowest position  Adequate lighting provided  Bed alarm on, Red Falling star and Stay with Me signs posted         Problem: Falls - Risk of:  Goal: Absence of physical injury  Description: Absence of physical injury  Outcome: Ongoing     Problem: Pain:  Goal: Pain level will decrease  Description: Pain level will decrease  Outcome: Ongoing  Goal: Control of acute pain  Description: Control of acute pain  4/5/2021 0039 by Sophia Scott RN  Outcome: Ongoing  4/5/2021 0038 by Sophia Scott RN  Note: Pain level assessment complete. Patient educated on pain scale and control interventions  PRN pain medication given per patient request  Patient instructed to call out with new onset of pain or unrelieved pain    Goal: Control of chronic pain  Description: Control of chronic pain  Outcome: Ongoing     Problem: Skin Integrity:  Goal: Will show no infection signs and symptoms  Description: Will show no infection signs and symptoms  4/5/2021 0039 by Sophia Scott RN  Outcome: Ongoing  4/5/2021 0038 by Sophia Scott RN  Note: Checked for incontinence every 2 hours and prn. Pericare as needed. Assisted to reposition off back frequently. On waffle mattress- pt refusing   Heels off bed with pillows.     Goal: Absence of new skin breakdown  Description: Absence of new skin breakdown  Outcome: Ongoing
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
## 2021-04-05 NOTE — CARE COORDINATION
MRI to be done today lt BKA stump due to cellulitis. Continues on IV vanco and unasyn. Follow ID and vascular plan of care.

## 2021-04-06 ENCOUNTER — ANESTHESIA (OUTPATIENT)
Dept: OPERATING ROOM | Age: 66
DRG: 501 | End: 2021-04-06
Payer: MEDICARE

## 2021-04-06 ENCOUNTER — ANESTHESIA EVENT (OUTPATIENT)
Dept: OPERATING ROOM | Age: 66
DRG: 501 | End: 2021-04-06
Payer: MEDICARE

## 2021-04-06 ENCOUNTER — HOSPITAL ENCOUNTER (OUTPATIENT)
Dept: WOUND CARE | Age: 66
Discharge: HOME | End: 2021-04-06

## 2021-04-06 VITALS — TEMPERATURE: 97.9 F | DIASTOLIC BLOOD PRESSURE: 61 MMHG | OXYGEN SATURATION: 100 % | SYSTOLIC BLOOD PRESSURE: 121 MMHG

## 2021-04-06 LAB
BUN BLDV-MCNC: 15 MG/DL (ref 8–23)
CREAT SERPL-MCNC: 0.88 MG/DL (ref 0.7–1.2)
ESTIMATED AVERAGE GLUCOSE: 232 MG/DL
GFR AFRICAN AMERICAN: >60 ML/MIN
GFR NON-AFRICAN AMERICAN: >60 ML/MIN
GFR SERPL CREATININE-BSD FRML MDRD: NORMAL ML/MIN/{1.73_M2}
GFR SERPL CREATININE-BSD FRML MDRD: NORMAL ML/MIN/{1.73_M2}
GLUCOSE BLD-MCNC: 105 MG/DL (ref 75–110)
GLUCOSE BLD-MCNC: 121 MG/DL (ref 75–110)
GLUCOSE BLD-MCNC: 127 MG/DL (ref 75–110)
GLUCOSE BLD-MCNC: 175 MG/DL (ref 75–110)
GLUCOSE BLD-MCNC: 188 MG/DL (ref 75–110)
GLUCOSE BLD-MCNC: 98 MG/DL (ref 75–110)
HBA1C MFR BLD: 9.7 % (ref 4–6)
SARS-COV-2, RAPID: NOT DETECTED
SPECIMEN DESCRIPTION: NORMAL

## 2021-04-06 PROCEDURE — 2580000003 HC RX 258: Performed by: SURGERY

## 2021-04-06 PROCEDURE — 6360000002 HC RX W HCPCS: Performed by: INTERNAL MEDICINE

## 2021-04-06 PROCEDURE — 6360000002 HC RX W HCPCS: Performed by: NURSE ANESTHETIST, CERTIFIED REGISTERED

## 2021-04-06 PROCEDURE — 2500000003 HC RX 250 WO HCPCS: Performed by: NURSE ANESTHETIST, CERTIFIED REGISTERED

## 2021-04-06 PROCEDURE — 6370000000 HC RX 637 (ALT 250 FOR IP): Performed by: INTERNAL MEDICINE

## 2021-04-06 PROCEDURE — 0KBT0ZZ EXCISION OF LEFT LOWER LEG MUSCLE, OPEN APPROACH: ICD-10-PCS | Performed by: SURGERY

## 2021-04-06 PROCEDURE — 2580000003 HC RX 258: Performed by: NURSE PRACTITIONER

## 2021-04-06 PROCEDURE — 6360000002 HC RX W HCPCS: Performed by: SURGERY

## 2021-04-06 PROCEDURE — 3700000001 HC ADD 15 MINUTES (ANESTHESIA): Performed by: SURGERY

## 2021-04-06 PROCEDURE — 11044 DBRDMT BONE 1ST 20 SQ CM/<: CPT | Performed by: SURGERY

## 2021-04-06 PROCEDURE — 2580000003 HC RX 258: Performed by: NURSE ANESTHETIST, CERTIFIED REGISTERED

## 2021-04-06 PROCEDURE — 6370000000 HC RX 637 (ALT 250 FOR IP): Performed by: SURGERY

## 2021-04-06 PROCEDURE — 3700000000 HC ANESTHESIA ATTENDED CARE: Performed by: SURGERY

## 2021-04-06 PROCEDURE — 99232 SBSQ HOSP IP/OBS MODERATE 35: CPT | Performed by: INTERNAL MEDICINE

## 2021-04-06 PROCEDURE — 7100000001 HC PACU RECOVERY - ADDTL 15 MIN: Performed by: SURGERY

## 2021-04-06 PROCEDURE — 2580000003 HC RX 258: Performed by: INTERNAL MEDICINE

## 2021-04-06 PROCEDURE — 2709999900 HC NON-CHARGEABLE SUPPLY: Performed by: SURGERY

## 2021-04-06 PROCEDURE — 1200000000 HC SEMI PRIVATE

## 2021-04-06 PROCEDURE — 36415 COLL VENOUS BLD VENIPUNCTURE: CPT

## 2021-04-06 PROCEDURE — 6360000002 HC RX W HCPCS: Performed by: NURSE PRACTITIONER

## 2021-04-06 PROCEDURE — 99232 SBSQ HOSP IP/OBS MODERATE 35: CPT | Performed by: NURSE PRACTITIONER

## 2021-04-06 PROCEDURE — 84520 ASSAY OF UREA NITROGEN: CPT

## 2021-04-06 PROCEDURE — 7100000000 HC PACU RECOVERY - FIRST 15 MIN: Performed by: SURGERY

## 2021-04-06 PROCEDURE — 87635 SARS-COV-2 COVID-19 AMP PRB: CPT

## 2021-04-06 PROCEDURE — 3600000002 HC SURGERY LEVEL 2 BASE: Performed by: SURGERY

## 2021-04-06 PROCEDURE — 82565 ASSAY OF CREATININE: CPT

## 2021-04-06 PROCEDURE — 3600000012 HC SURGERY LEVEL 2 ADDTL 15MIN: Performed by: SURGERY

## 2021-04-06 PROCEDURE — 82947 ASSAY GLUCOSE BLOOD QUANT: CPT

## 2021-04-06 PROCEDURE — 51798 US URINE CAPACITY MEASURE: CPT

## 2021-04-06 RX ORDER — PHENYLEPHRINE HCL IN 0.9% NACL 1 MG/10 ML
SYRINGE (ML) INTRAVENOUS PRN
Status: DISCONTINUED | OUTPATIENT
Start: 2021-04-06 | End: 2021-04-06 | Stop reason: SDUPTHER

## 2021-04-06 RX ORDER — ONDANSETRON 2 MG/ML
4 INJECTION INTRAMUSCULAR; INTRAVENOUS
Status: DISCONTINUED | OUTPATIENT
Start: 2021-04-06 | End: 2021-04-06 | Stop reason: HOSPADM

## 2021-04-06 RX ORDER — SENNA AND DOCUSATE SODIUM 50; 8.6 MG/1; MG/1
2 TABLET, FILM COATED ORAL 2 TIMES DAILY
Status: DISCONTINUED | OUTPATIENT
Start: 2021-04-06 | End: 2021-04-12 | Stop reason: HOSPADM

## 2021-04-06 RX ORDER — ONDANSETRON 2 MG/ML
INJECTION INTRAMUSCULAR; INTRAVENOUS PRN
Status: DISCONTINUED | OUTPATIENT
Start: 2021-04-06 | End: 2021-04-06 | Stop reason: SDUPTHER

## 2021-04-06 RX ORDER — FENTANYL CITRATE 50 UG/ML
25 INJECTION, SOLUTION INTRAMUSCULAR; INTRAVENOUS EVERY 5 MIN PRN
Status: DISCONTINUED | OUTPATIENT
Start: 2021-04-06 | End: 2021-04-06 | Stop reason: HOSPADM

## 2021-04-06 RX ORDER — PROPOFOL 10 MG/ML
INJECTION, EMULSION INTRAVENOUS PRN
Status: DISCONTINUED | OUTPATIENT
Start: 2021-04-06 | End: 2021-04-06 | Stop reason: SDUPTHER

## 2021-04-06 RX ORDER — MIDAZOLAM HYDROCHLORIDE 1 MG/ML
INJECTION INTRAMUSCULAR; INTRAVENOUS PRN
Status: DISCONTINUED | OUTPATIENT
Start: 2021-04-06 | End: 2021-04-06 | Stop reason: SDUPTHER

## 2021-04-06 RX ORDER — LIDOCAINE HYDROCHLORIDE 20 MG/ML
INJECTION, SOLUTION EPIDURAL; INFILTRATION; INTRACAUDAL; PERINEURAL PRN
Status: DISCONTINUED | OUTPATIENT
Start: 2021-04-06 | End: 2021-04-06 | Stop reason: SDUPTHER

## 2021-04-06 RX ORDER — BUDESONIDE AND FORMOTEROL FUMARATE DIHYDRATE 160; 4.5 UG/1; UG/1
2 AEROSOL RESPIRATORY (INHALATION) 2 TIMES DAILY
Status: DISCONTINUED | OUTPATIENT
Start: 2021-04-06 | End: 2021-04-12 | Stop reason: HOSPADM

## 2021-04-06 RX ORDER — GLYCOPYRROLATE 1 MG/5 ML
SYRINGE (ML) INTRAVENOUS PRN
Status: DISCONTINUED | OUTPATIENT
Start: 2021-04-06 | End: 2021-04-06 | Stop reason: SDUPTHER

## 2021-04-06 RX ORDER — OXYCODONE HYDROCHLORIDE AND ACETAMINOPHEN 5; 325 MG/1; MG/1
2 TABLET ORAL EVERY 4 HOURS PRN
Status: DISCONTINUED | OUTPATIENT
Start: 2021-04-06 | End: 2021-04-12 | Stop reason: HOSPADM

## 2021-04-06 RX ORDER — SODIUM CHLORIDE 9 MG/ML
INJECTION, SOLUTION INTRAVENOUS CONTINUOUS PRN
Status: DISCONTINUED | OUTPATIENT
Start: 2021-04-06 | End: 2021-04-06 | Stop reason: SDUPTHER

## 2021-04-06 RX ORDER — ROCURONIUM BROMIDE 10 MG/ML
INJECTION, SOLUTION INTRAVENOUS PRN
Status: DISCONTINUED | OUTPATIENT
Start: 2021-04-06 | End: 2021-04-06 | Stop reason: SDUPTHER

## 2021-04-06 RX ORDER — FENTANYL CITRATE 50 UG/ML
INJECTION, SOLUTION INTRAMUSCULAR; INTRAVENOUS PRN
Status: DISCONTINUED | OUTPATIENT
Start: 2021-04-06 | End: 2021-04-06 | Stop reason: SDUPTHER

## 2021-04-06 RX ORDER — OXYCODONE HYDROCHLORIDE AND ACETAMINOPHEN 5; 325 MG/1; MG/1
1 TABLET ORAL EVERY 4 HOURS PRN
Status: DISCONTINUED | OUTPATIENT
Start: 2021-04-06 | End: 2021-04-12 | Stop reason: HOSPADM

## 2021-04-06 RX ORDER — BLOOD-GLUCOSE METER
1 KIT MISCELLANEOUS DAILY
Status: DISCONTINUED | OUTPATIENT
Start: 2021-04-06 | End: 2021-04-06 | Stop reason: RX

## 2021-04-06 RX ORDER — MORPHINE SULFATE 4 MG/ML
4 INJECTION, SOLUTION INTRAMUSCULAR; INTRAVENOUS EVERY 4 HOURS PRN
Status: DISCONTINUED | OUTPATIENT
Start: 2021-04-06 | End: 2021-04-06

## 2021-04-06 RX ORDER — NEOSTIGMINE METHYLSULFATE 5 MG/5 ML
SYRINGE (ML) INTRAVENOUS PRN
Status: DISCONTINUED | OUTPATIENT
Start: 2021-04-06 | End: 2021-04-06 | Stop reason: SDUPTHER

## 2021-04-06 RX ORDER — MORPHINE SULFATE 2 MG/ML
2 INJECTION, SOLUTION INTRAMUSCULAR; INTRAVENOUS EVERY 4 HOURS PRN
Status: DISCONTINUED | OUTPATIENT
Start: 2021-04-06 | End: 2021-04-06

## 2021-04-06 RX ORDER — TAMSULOSIN HYDROCHLORIDE 0.4 MG/1
0.4 CAPSULE ORAL DAILY
Status: DISCONTINUED | OUTPATIENT
Start: 2021-04-06 | End: 2021-04-12 | Stop reason: HOSPADM

## 2021-04-06 RX ORDER — ALBUTEROL SULFATE 90 UG/1
2 AEROSOL, METERED RESPIRATORY (INHALATION) EVERY 6 HOURS PRN
Status: DISCONTINUED | OUTPATIENT
Start: 2021-04-06 | End: 2021-04-12 | Stop reason: HOSPADM

## 2021-04-06 RX ADMIN — SODIUM CHLORIDE: 9 INJECTION, SOLUTION INTRAVENOUS at 08:27

## 2021-04-06 RX ADMIN — Medication 100 MCG: at 08:49

## 2021-04-06 RX ADMIN — MORPHINE SULFATE 4 MG: 4 INJECTION, SOLUTION INTRAMUSCULAR; INTRAVENOUS at 16:31

## 2021-04-06 RX ADMIN — Medication 50 MCG: at 08:32

## 2021-04-06 RX ADMIN — AMITRIPTYLINE HYDROCHLORIDE 75 MG: 25 TABLET, FILM COATED ORAL at 21:10

## 2021-04-06 RX ADMIN — CEFEPIME HYDROCHLORIDE 2000 MG: 2 INJECTION, POWDER, FOR SOLUTION INTRAVENOUS at 18:20

## 2021-04-06 RX ADMIN — SODIUM CHLORIDE: 9 INJECTION, SOLUTION INTRAVENOUS at 05:41

## 2021-04-06 RX ADMIN — PROPOFOL 110 MG: 10 INJECTION, EMULSION INTRAVENOUS at 08:32

## 2021-04-06 RX ADMIN — Medication 5 MG: at 09:06

## 2021-04-06 RX ADMIN — CEFEPIME HYDROCHLORIDE 2000 MG: 2 INJECTION, POWDER, FOR SOLUTION INTRAVENOUS at 05:41

## 2021-04-06 RX ADMIN — Medication 100 MCG: at 08:39

## 2021-04-06 RX ADMIN — Medication 100 MCG: at 08:58

## 2021-04-06 RX ADMIN — HYDROMORPHONE HYDROCHLORIDE 0.5 MG: 1 INJECTION, SOLUTION INTRAMUSCULAR; INTRAVENOUS; SUBCUTANEOUS at 22:26

## 2021-04-06 RX ADMIN — Medication 50 MCG: at 08:46

## 2021-04-06 RX ADMIN — INSULIN GLARGINE 25 UNITS: 100 INJECTION, SOLUTION SUBCUTANEOUS at 17:09

## 2021-04-06 RX ADMIN — LIDOCAINE HYDROCHLORIDE 40 MG: 20 INJECTION, SOLUTION EPIDURAL; INFILTRATION; INTRACAUDAL; PERINEURAL at 08:32

## 2021-04-06 RX ADMIN — OXYCODONE AND ACETAMINOPHEN 2 TABLET: 5; 325 TABLET ORAL at 19:50

## 2021-04-06 RX ADMIN — APIXABAN 5 MG: 5 TABLET, FILM COATED ORAL at 21:10

## 2021-04-06 RX ADMIN — MIDAZOLAM 2 MG: 1 INJECTION INTRAMUSCULAR; INTRAVENOUS at 08:27

## 2021-04-06 RX ADMIN — INSULIN LISPRO 1 UNITS: 100 INJECTION, SOLUTION INTRAVENOUS; SUBCUTANEOUS at 17:08

## 2021-04-06 RX ADMIN — Medication 1 MG: at 09:06

## 2021-04-06 RX ADMIN — VANCOMYCIN HYDROCHLORIDE 1000 MG: 1 INJECTION, POWDER, LYOPHILIZED, FOR SOLUTION INTRAVENOUS at 00:22

## 2021-04-06 RX ADMIN — ONDANSETRON 4 MG: 2 INJECTION, SOLUTION INTRAMUSCULAR; INTRAVENOUS at 09:01

## 2021-04-06 RX ADMIN — SODIUM CHLORIDE: 9 INJECTION, SOLUTION INTRAVENOUS at 13:11

## 2021-04-06 RX ADMIN — TAMSULOSIN HYDROCHLORIDE 0.4 MG: 0.4 CAPSULE ORAL at 13:16

## 2021-04-06 RX ADMIN — ROCURONIUM BROMIDE 30 MG: 10 INJECTION, SOLUTION INTRAVENOUS at 08:32

## 2021-04-06 RX ADMIN — FAMOTIDINE 20 MG: 20 TABLET ORAL at 21:10

## 2021-04-06 RX ADMIN — VANCOMYCIN HYDROCHLORIDE 1000 MG: 1 INJECTION, POWDER, LYOPHILIZED, FOR SOLUTION INTRAVENOUS at 12:16

## 2021-04-06 RX ADMIN — PROBIOTIC PRODUCT - TAB 1 TABLET: TAB at 21:10

## 2021-04-06 RX ADMIN — MORPHINE SULFATE 4 MG: 4 INJECTION, SOLUTION INTRAMUSCULAR; INTRAVENOUS at 04:44

## 2021-04-06 RX ADMIN — POLYETHYLENE GLYCOL 3350 17 G: 17 POWDER, FOR SOLUTION ORAL at 13:11

## 2021-04-06 RX ADMIN — HYDROCODONE BITARTRATE AND ACETAMINOPHEN 2 TABLET: 5; 325 TABLET ORAL at 12:15

## 2021-04-06 RX ADMIN — DOCUSATE SODIUM 50MG AND SENNOSIDES 8.6MG 2 TABLET: 8.6; 5 TABLET, FILM COATED ORAL at 21:10

## 2021-04-06 ASSESSMENT — ENCOUNTER SYMPTOMS
GASTROINTESTINAL NEGATIVE: 1
RESPIRATORY NEGATIVE: 1

## 2021-04-06 ASSESSMENT — PAIN DESCRIPTION - DESCRIPTORS
DESCRIPTORS: ACHING
DESCRIPTORS: ACHING;DISCOMFORT

## 2021-04-06 ASSESSMENT — PULMONARY FUNCTION TESTS
PIF_VALUE: 20
PIF_VALUE: 25
PIF_VALUE: 1
PIF_VALUE: 22
PIF_VALUE: 23
PIF_VALUE: 20
PIF_VALUE: 20
PIF_VALUE: 0
PIF_VALUE: 17
PIF_VALUE: 20
PIF_VALUE: 0
PIF_VALUE: 20
PIF_VALUE: 29
PIF_VALUE: 20
PIF_VALUE: 0
PIF_VALUE: 20

## 2021-04-06 ASSESSMENT — PAIN DESCRIPTION - ORIENTATION
ORIENTATION: LEFT
ORIENTATION: LEFT

## 2021-04-06 ASSESSMENT — PAIN DESCRIPTION - FREQUENCY
FREQUENCY: INTERMITTENT
FREQUENCY: CONTINUOUS
FREQUENCY: CONTINUOUS

## 2021-04-06 ASSESSMENT — PAIN SCALES - GENERAL
PAINLEVEL_OUTOF10: 8
PAINLEVEL_OUTOF10: 9
PAINLEVEL_OUTOF10: 10

## 2021-04-06 ASSESSMENT — PAIN DESCRIPTION - LOCATION
LOCATION: LEG
LOCATION: KNEE
LOCATION: LEG

## 2021-04-06 ASSESSMENT — PAIN DESCRIPTION - PAIN TYPE
TYPE: ACUTE PAIN;SURGICAL PAIN
TYPE: SURGICAL PAIN
TYPE: ACUTE PAIN
TYPE: ACUTE PAIN
TYPE: SURGICAL PAIN

## 2021-04-06 ASSESSMENT — LIFESTYLE VARIABLES: SMOKING_STATUS: 1

## 2021-04-06 ASSESSMENT — PAIN DESCRIPTION - ONSET: ONSET: ON-GOING

## 2021-04-06 ASSESSMENT — PAIN - FUNCTIONAL ASSESSMENT: PAIN_FUNCTIONAL_ASSESSMENT: PREVENTS OR INTERFERES WITH ALL ACTIVE AND SOME PASSIVE ACTIVITIES

## 2021-04-06 ASSESSMENT — PAIN DESCRIPTION - PROGRESSION
CLINICAL_PROGRESSION: NOT CHANGED
CLINICAL_PROGRESSION: GRADUALLY IMPROVING

## 2021-04-06 NOTE — PLAN OF CARE
Problem: Skin Integrity:  Goal: Will show no infection signs and symptoms  Description: Will show no infection signs and symptoms  4/6/2021 1442 by Iantha Sandhoff, RN  Outcome: Ongoing  4/6/2021 1400 by Iantha Sandhoff, RN  Outcome: Ongoing  Goal: Absence of new skin breakdown  Description: Absence of new skin breakdown  4/6/2021 1442 by Iantha Sandhoff, RN  Outcome: Ongoing  4/6/2021 1400 by Iantha Sandhoff, RN  Outcome: Ongoing

## 2021-04-06 NOTE — OP NOTE
well and was transferred to the recovery room in satisfactory  condition. Sponge, instrument, and needle counts were correct at the  conclusion of the operation.         Ruben Pedraza MD    D: 04/06/2021 9:24:26       T: 04/06/2021 9:33:26     [id number]  Job#: [id number]     Doc#: [id number]    CC:  MD Leticia Hernandez

## 2021-04-06 NOTE — PROGRESS NOTES
Not Detected 02/04/2021    COVID19 Not Detected 12/31/2020    COVID19 Not Detected 11/10/2020    COVID19 Not Detected 08/23/2020       Recent Labs     04/05/21  0950   [facility] 13.9       Imaging Studies:   MRI tib/fib left  Impression   1. Status post left below-the-knee amputation with a soft tissue defect at   the anterior distal aspect of the stump.  Underlying soft tissue edema and   area of irregular nonenhancing soft tissue extending to the margin of the   tibial stump.  Findings are compatible with cellulitis/myositis.  No   drainable fluid collection identified. 2. Significant marrow edema within the distal tibial stump concerning for   osteomyelitis.  Alternatively, postoperative edema is a possibility. 3. Mild marrow edema at the distal margin of the fibular stump likely   postoperative. 4. Chronic healed fracture of the lateral tibial plateau.  No convincing   evidence of internal derangement.             Cultures:   4/5/2021 10:11 AM - Alfonso, M[facility]pn Incoming Lab Results From [facility]    Specimen Information: Ulcer        Component Collected Lab   Specimen Description 04/03/2021  7:50  [facility]   . ULCER    Special Requests 04/03/2021  7:50  [facility]   NOT REPORTED    Direct Exam Abnormal  04/03/2021  7:50 PM [facility]    Direct Exam Abnormal  04/03/2021  7:50  [facility] RODS    Direct Exam Abnormal  04/03/2021  7:50  [facility] COCCI IN PAIRS    Direct Exam Abnormal  04/03/2021  7:50  [address]   FEW GRAM POSITIVE COCCI IN CLUSTERS    Culture Abnormal  04/03/2021  7:50 PM [address] GROWTH    Culture Abnormal  04/03/2021  7:50 PM [address]    Testing Performed By    Lab - Abbreviation Name Director Address Valid Date Range   [address], MD [address] [facility] 08/30/17 0801-Present   [address] [place] [address] [address] LAB Silvino Enriquez MD [address] [facility] 200 [address] 08/30/17 0757-Present   Lab and Collection    Culture, Wound - 4/3/2021    Culture, Blood 1 [[id number]] Collected: 04/03/21 1935   Order Status: Completed Specimen: Blood Updated: 04/06/21 0001    Specimen Description . BLOOD    Special Requests RAC    Culture NO GROWTH 3 DAYS   Culture, Blood 2 [[id number]] Collected: 04/03/21 1935   Order Status: Completed Specimen: Blood Updated: 04/06/21 0001    Specimen Description . BLOOD    Special Requests RFA    Culture NO GROWTH 3 DAYS         Medications:      cefepime  2,000 mg Intravenous Q12H    amitriptyline  75 mg Oral Nightly    apixaban  5 mg Oral BID    famotidine  20 mg Oral BID    insulin glargine  25 Units Subcutaneous Dinner    lactobacillus  1 tablet Oral BID    sodium chloride flush  10 mL Intravenous 2 times per day    insulin lispro  0-6 Units Subcutaneous TID WC    insulin lispro  0-3 Units Subcutaneous Nightly    vancomycin  1,000 mg Intravenous Q12H    vancomycin (VANCOCIN) intermittent dosing (placeholder)   Other RX Placeholder           [facility]  [address] [place] [address]  Perfect Serve messaging  OFFICE: [phone]      Electronically signed by [address] [place] [address], APRN - CNP on 4/6/2021 at 7:19 AM  Thank you for allowing us to participate in the care of this patient. Please call with questions. This note iscreated with the assistance of a speech recognition program.  While intending to generate a document that actually reflects the content of the visit, the document can still have some errors including those of syntax andsound a like substitutions which may escape proof reading.   In such instances, actual meaning can be extrapolated by contextual
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
## 2021-04-06 NOTE — ANESTHESIA POSTPROCEDURE EVALUATION
Department of Anesthesiology  Postprocedure Note    Patient: Gaston Sher  MRN: [id number]  YOB: 1955  Date of evaluation: 4/6/2021  Time:  6:14 PM     Procedure Summary     Date: 04/06/21 Room / Location: [address] - INPATIENT    Anesthesia Start: [facility] Anesthesia Stop: 4053    Procedure: STUMP DEBRIDEMENT INCISION AND DRAINAGE (Left Leg Upper) Diagnosis: (LEFT STUMP CELLULITIS)    Surgeons: Eve Rosenbaum MD Responsible Provider: Aditya Olmedo MD    Anesthesia Type: general ASA Status: 4 - Emergent          Anesthesia Type: general    Lynne Phase I: Lynne Score: 10    Lynne Phase II:      Last vitals: Reviewed and per EMR flowsheets.        Anesthesia Post Evaluation    Patient location during evaluation: PACU  Patient participation: complete - patient participated  Level of consciousness: awake  Airway patency: patent  Nausea & Vomiting: no nausea  Complications: no  Cardiovascular status: blood pressure returned to baseline  Respiratory status: acceptable  Hydration status: euvolemic
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
## 2021-04-06 NOTE — FLOWSHEET NOTE
Patient is awake and alert while reclining. Patient is approachable and engages in conversation. Patient shares that he has been in this hospital previously. Patient requests a prayer and reports that he attends [facility] provides listening presence and emotional support along with prayer for healing, peace, and rest. Patient expresses appreciation for the visit. Spiritual Care will follow as needed. 04/05/21 1915   Encounter Summary   Services provided to: Patient   Referral/Consult From: [address] [place] [address] Parent; Children   Place of [address] No   Continue Visiting   (4/5/21)   Complexity of Encounter Moderate   Length of Encounter 15 minutes   Spiritual Assessment Completed Yes   Routine   Type Follow up   Assessment Approachable   Intervention Active listening;Explored feelings, thoughts, concerns;Explored coping resources;Nurtured hope;Prayer   Outcome Expressed gratitude

## 2021-04-06 NOTE — CARE COORDINATION
Pt had lt stump debridement and washout with placement of wound vac today per Dr. Maximilian De La Garza. Auth form for wound vac placed in chart to be filled out per Dr. Maximilian De La Garza. Spoke to Amanda Leyva with [employer] to inform of need for wound vac. Face sheet, H&P and op note faxed to [phone]. Will need to fax auth form when completed.

## 2021-04-06 NOTE — PROGRESS NOTES
[facility]  Office: [address], DO, Tammy Fagan, DO, Jorge Angulokimmy, DO, Cherelle Valerie Blood, DO, Emigdio Ross MD, Isra Little MD, Arnie Munoz MD, Deedee Brunner, MD, Omid Howell MD, Ana Perea MD, Kali Corrales MD, Sol Barrett MD, Mart Reynolds DO, Dary Lee MD, Osmin Holden DO, Deborah Sunshine MD,  Darrisu Harrison DO, Frank Galvez MD, Coreen Gomez MD, Joe Prado MD, Porter Marino MD, Millie Fox, [facility], [facility], [facility], Yurialirio Adame, [facility], Dylon Beckham, CNS, Arnold Morrison, CNP, Nola Phalen, CNP, Lucio Grissom, CNP, Jackson Tavares, CNP, Catracho Miller, CNP, Carleen Gonzales PA-C, Mariah Gottlieb, ANIYAH, Judith Reed, CNP, María Elena Brothers, CNP, Daniel Valdovinos, CNP, Alison Martinez, CNP, Ernie Guardado, CNP, Candy Escalera, [facility]    Progress Note    4/6/2021    6:57 PM    Name:   Shaian Blanco  MRN:     [id number]     Acct:      [de-identified]   Room:   2004/2004-02  IP Day:  3  Admit Date:  4/3/2021  7:18 PM    PCP:   Yossi Toledo DO  Code Status:  Full Code    Subjective:     C/C:   Chief Complaint   Patient presents with    Leg Pain     Interval History Status: worsened. Pt is very upset today because he had trouble urinating and the nurse straight cathed him. He is now having dysuria. He is also constipated x 4 days. + Pain in left LE after amp revision. The wound vac is on, and he is getting Norco or IV morphine which aren't helping. Brief History:     Per my partner:  \"[id number] presented with progressively worsening left leg pain and redness over the prior BKA stump. Patient underwent left BKA on February 9 for Dr. Ludin Danielle.   history includes diabetes, tobacco dependence, COPD, hyperlipidemia, GERD, marijuana use, history of esophageal cancer, PAD s/p left superficial femoral-popliteal artery, common and external iliac artery angioplasty and 7/2020\"    Review of Systems:     Constitutional:  negative for chills, fevers, sweats  Respiratory:  negative for cough, dyspnea on exertion, shortness of breath, wheezing  Cardiovascular:  negative for chest pain, chest pressure/discomfort, lower extremity edema, palpitations  Gastrointestinal:  negative for abdominal pain, , diarrhea, nausea, vomiting, +constipation  Neurological:  negative for dizziness, headache    Medications: Allergies:     Allergies   Allergen Reactions    Gabapentin Other (See Comments)     dizziness       Current Meds:   Scheduled Meds:    budesonide-formoterol  2 puff Inhalation BID    tamsulosin  0.4 mg Oral Daily    cefepime  2,000 mg Intravenous Q12H    amitriptyline  75 mg Oral Nightly    apixaban  5 mg Oral BID    famotidine  20 mg Oral BID    insulin glargine  25 Units Subcutaneous Dinner    lactobacillus  1 tablet Oral BID    sodium chloride flush  10 mL Intravenous 2 times per day    insulin lispro  0-6 Units Subcutaneous TID WC    insulin lispro  0-3 Units Subcutaneous Nightly    vancomycin  1,000 mg Intravenous Q12H    vancomycin (VANCOCIN) intermittent dosing (placeholder)   Other RX Placeholder     Continuous Infusions:    dextrose      sodium chloride 75 mL/hr at 04/06/21 1311    sodium chloride       PRN Meds: morphine **OR** morphine, oxyCODONE-acetaminophen, oxyCODONE-acetaminophen, albuterol sulfate HFA, glucose, dextrose, glucagon (rDNA), dextrose, sodium chloride flush, sodium chloride, potassium chloride **OR** potassium alternative oral replacement **OR** potassium chloride, magnesium sulfate, promethazine **OR** ondansetron, polyethylene glycol, nicotine, acetaminophen **OR** acetaminophen, hydrOXYzine, HYDROcodone 5 mg - acetaminophen **OR** HYDROcodone 5 mg - acetaminophen    Data:     Past Medical History:   has a past medical history of Allergic rhinitis, Cellulitis of right heel, Chronic refractory osteomyelitis of left foot ([facility] [address] COPD (chronic obstructive GLUCOSE 319* 333*  --    BUN 14 15 15   CREATININE 0.85 1.17 0.88   ANIONGAP 14 10  --    LABGLOM >60 >60 >60   GFRAA >60 >60 >60   CALCIUM 8.8 8.3*  --      Recent Labs     04/03/21  2141 04/03/21  2141 04/05/21 2012 04/06/21  0638 04/06/21  0917 04/06/21  1204 04/06/21  1608 04/06/21  1822   LABA1C 9.7*  --   --   --   --   --   --   --    POCGLU  --    < > 208* 127* 105 98 188* 175*    < > = values in this interval not displayed. ABG:  Lab Results   Component Value Date    FIO2 NOT REPORTED 12/18/2020     Lab Results   Component Value Date/Time    SPECIAL NOT REPORTED 04/03/2021 07:50 PM     Lab Results   Component Value Date/Time    CULTURE STAPHYLOCOCCUS AUREUS MODERATE GROWTH (A) 04/03/2021 07:50 PM    CULTURE PSEUDOMONAS SPECIES MODERATE GROWTH (A) 04/03/2021 07:50 PM    CULTURE LACTOSE FERMENTING GRAM NEGATIVE RODS LIGHT GROWTH (A) 04/03/2021 07:50 PM       Radiology:  Xr Tibia Fibula Left (2 Views)    Result Date: 4/3/2021  Left below-the-knee amputation deformity. No acute findings. Mri Tibia Fibula Left W Wo Contrast    Result Date: 4/5/2021  1. Status post left below-the-knee amputation with a soft tissue defect at the anterior distal aspect of the stump. Underlying soft tissue edema and area of irregular nonenhancing soft tissue extending to the margin of the tibial stump. Findings are compatible with cellulitis/myositis. No drainable fluid collection identified. 2. Significant marrow edema within the distal tibial stump concerning for osteomyelitis. Alternatively, postoperative edema is a possibility. 3. Mild marrow edema at the distal margin of the fibular stump likely postoperative. 4. Chronic healed fracture of the lateral tibial plateau. No convincing evidence of internal derangement. Physical Examination:        Pt refused.      Gen: A & O x 3, in pain    Assessment:        Hospital Problems           Last Modified POA    * (Principal) Cellulitis 4/3/2021 Yes    Type 2 diabetes mellitus with diabetic polyneuropathy, with long-term current use of insulin ([address] 4/3/2021 Yes    Diabetic polyneuropathy ([address] 4/3/2021 Yes    Tobacco dependence 4/3/2021 Yes    Edentulous 4/3/2021 Yes    Chronic obstructive pulmonary disease ([address] 4/3/2021 Yes    HLD (hyperlipidemia) 4/3/2021 Yes    Gastroesophageal reflux disease 4/3/2021 Yes    Marijuana use 4/3/2021 Yes    History of esophageal cancer 4/3/2021 Yes    PAD (peripheral artery disease) ([address] 4/3/2021 Yes    Carotid stenosis, asymptomatic, bilateral (Chronic) 4/3/2021 Yes    Below-knee amputation of right lower extremity ([address] (Chronic) 4/3/2021 Yes    Moderate malnutrition ([address] (Chronic) 4/3/2021 Yes          Plan:        1. LEFT BKA with wounds and cellulitis s/p wash out and wound vac placement- Con't IV abx per ID. Stop Norco, try Percocet prn and IV  0.5mg q 3 hrs prn pain  2. DM2- uncontrolled, monitor and control BS, SSI  3. Chronic pain syndrome  4. Acute urinary retention- Flomax started, place dailey if PVR > 300 ml, will treat constipation too  5. PAD s/p angioplasty  6. Constipation- start Senna-S BID, Dulcolax prn  7.  Gi proph    dw nurse    Carmela Khalil MD  4/6/2021  6:57 PM

## 2021-04-06 NOTE — DISCHARGE INSTR - COC
Continuity of Care Form    Patient Name: Angela Echevarria   :  1955  MRN:  [id number]    Admit date:  4/3/2021  Discharge date:  2021    Code Status Order: Full Code   Advance Directives:   Advance Care Flowsheet Documentation       Date/Time Healthcare Directive Type of Healthcare Directive Copy in [address] Agent's Name Healthcare Agent's Phone Number    [phone]  No, patient does not have an advance directive for healthcare treatment -- -- -- -- --            Admitting Physician:  Ge Peña MD  PCP: Sharmila Monte DO    Discharging Nurse:  [address] Unit/Room#:   Discharging Unit Phone Number: [phone]    Emergency Contact:   Extended Emergency Contact Information  Primary Emergency Contact: [facility]  Address: [place]  Home Phone: [phone]  Relation: Parent  Secondary Emergency Contact: [address] Phone: [phone]  Mobile Phone: [phone]  Relation: Child    Past Surgical History:  Past Surgical History:   Procedure Laterality Date    COLONOSCOPY  2015    hyperplastic polyp    COLONOSCOPY  2017    ESOPHAGECTOMY      cancer    FOOT DEBRIDEMENT Left 2021    I&D LEFT FOOT WITH REMOVAL OF NONVIABLE BONE AND SOFT TISSUE performed by Dylan Gonzales DPM at [address] [place] [address] Left 2021    LEFT FOOT DEBRIDEMENT WITH REMOVAL ALL NON VIABLE SOFT TISSUE AND BONE performed by Dylan Gonzales DPM at [address] [place] [address] Right 2016    I & D heel    FOOT SURGERY Right 2016    I & D    FRACTURE SURGERY Left 2015    humerus left, left leg    IR INS PICC VAD W SQ PORT GREATER THAN 5  2020    IR INS PICC VAD W SQ PORT GREATER THAN 5 2020 Oxana Farrar MD [facility]    IR INS PICC VAD W SQ PORT GREATER THAN 5  2021    IR INS PICC VAD W SQ PORT GREATER THAN 5 2021 MD Víctor Vu [facility] PROCED[facility]    LEG AMPUTATION BELOW KNEE Right 01/21/2017    LEG AMPUTATION BELOW KNEE Left 2/9/2021    LEFT  LEG AMPUTATION BELOW KNEE performed by Van Leary MD at [address] Left 4/6/2021    STUMP DEBRIDEMENT INCISION AND DRAINAGE performed by Van Leary MD at [facility] Right 2014    rt 3rd through 5th digits    TOE AMPUTATION Left 5/26/2016    left foot 5th toe    TOE AMPUTATION Left 8/5/2020    FOOT TRANSMETATARSAL  AMPUTATION - AND LEFT PECUTANEOUS TENDO ACHILLES LENGTHENING performed by Elvira Dooley DPM at [facility] TOE AMPUTATION Left 8/24/2020    REVISION  TRANSMETATARSAL AMPUTATION WITH DEBRIDEMENT. performed by Elvira Dooley DPM at [place] [address]      5/14/13- with dilation    VASCULAR SURGERY Right 01/16/2017    foot guillotine amputation       Immunization History:   Immunization History   Administered Date(s) Administered    Influenza Vaccine, unspecified formulation 10/10/2016    Influenza Virus Vaccine 11/03/2014, 10/29/2015    Influenza, Emily Sharma, IM, (6 mo and older Fluzone, Flulaval, Fluarix and 3 yrs and older Afluria) 10/10/2016    Pneumococcal Polysaccharide ([id number]) 09/05/2012, 10/29/2015    Tdap (Boostrix, Adacel) 07/01/2019       Active Problems:  Patient Active Problem List   Diagnosis Code    Type 2 diabetes mellitus with diabetic polyneuropathy, with long-term current use of insulin ([facility]) E11.42, Z79.4    Neuropathic pain, leg M79.2    Diabetic polyneuropathy ([facility] E11.42    Allergic rhinitis J30.9    Tobacco dependence F17.200    Edentulous K08.109    Dysphagia R13.10    Lung nodules R91.8    Esophageal cancer ([facility]) C15.9    Fracture of humerus, left, closed S42.302A    Closed fracture of humerus S42.309A    DM type 2 with diabetic peripheral neuropathy ([facility]) E11.42    Chronic obstructive pulmonary disease ([facility]) J44.9    HLD (hyperlipidemia) E78.5    Gastroesophageal Diabetic ulcer of left midfoot associated with type 2 diabetes mellitus, with necrosis of bone ([facility] E11.621, L97.424    History of below knee amputation, right ([address] Z89.511    Foot ulcer with fat layer exposed, left ([facility]) L97.522    Chronic refractory osteomyelitis of left foot ([facility]) M86.672    Sepsis ([facility]) A41.9    Pyogenic inflammation of bone ([facility] M86.9    Cellulitis L03.90       Isolation/Infection:   Isolation            Contact          Patient Infection Status       Infection Onset Added Last Indicated Last Indicated By Review Planned Expiration Resolved Resolved By    MRSA 02/03/21 02/05/21 02/03/21 Culture, Blood 1        2/2021 blood    VRE 08/24/20 08/27/20 08/24/20 Culture, Anaerobic and Aerobic        Foot - 12/2020    Resolved    C-diff Rule Out 01/11/21 01/11/21 01/11/21 C DIFF TOXIN/ANTIGEN (Ordered)   02/05/21 Hansel Soto RN    COVID-19 Rule Out 08/22/20 08/22/20 08/23/20 COVID-19 (Ordered)   08/23/20 Rule-Out Test Resulted    COVID-19 Rule Out 08/01/20 08/01/20 08/01/20 COVID-19 (Ordered)   08/02/20 Morales Liriano RN    Test discontinued - negative result this admission    MRSA  01/02/17 01/02/17 Morales Liriano RN   07/31/20 Morales Liriano RN    2 negative nasal screen - 2020  Foot - 6/2018            Nurse Assessment:  Last Vital Signs: BP (!) 153/71   Pulse 97   Temp 97.7 °F (36.5 °C) (Oral)   Resp 18   Ht 6' (1.829 m)   Wt 141 lb 12.8 oz (64.3 kg)   SpO2 93%   BMI 19.23 kg/m²     Last documented pain score (0-10 scale): Pain Level: 9  Last Weight:   Wt Readings from Last 1 Encounters:   04/06/21 141 lb 12.8 oz (64.3 kg)     Mental Status:  oriented and alert    IV Access:  - PICC - site  R Basilic, insertion date: 4-8-2021    Nursing Mobility/ADLs:  Walking   Assisted  Transfer  Assisted  Bathing  Assisted  Dressing  Independent  Toileting  Independent  Feeding  [address]  Independent  Med Delivery   whole    Wound Care Documentation and Therapy:  Negative Pressure Wound Therapy Leg Anterior; Left (Active)   Wound Type Surgical 04/06/21 1040   Dressing Type Black foam 04/06/21 1040   Cycle Continuous 04/06/21 1040   Target Pressure (mmHg) 125 04/06/21 1040   Dressing Status Clean;Dry; Intact 04/06/21 1000   Drainage Amount Small 04/06/21 1000   Drainage Description Serosanguinous 04/06/21 1000   Odalys-wound Assessment Red 04/06/21 1040   Number of days: 0       Wound 04/03/21 Leg Left stump wound- cluster of two wounds that connect beneath the skin (Active)   Wound Image   04/04/21 1529   Wound Etiology Surgical 04/06/21 0400   Dressing Status New dressing applied 04/06/21 0400   Wound Cleansed Irrigated with saline 04/06/21 0400   Dressing/Treatment Negative pressure wound therapy 04/06/21 1040   Dressing Change Due 04/06/21 04/06/21 0400   Drainage Amount Small 04/06/21 0400   Drainage Description Serosanguinous;Purulent 04/06/21 0400   Odor None 04/06/21 0400   Odalys-wound Assessment Blanchable erythema;Edematous 04/06/21 0400   Margins Unattached edges; Undefined edges 04/04/21 1529   Wound Thickness Description not for Pressure Injury Full thickness 04/04/21 1529   Number of days: 2        Elimination:  Continence:   · Bowel: Yes  · Bladder: Yes  Urinary Catheter: None   Colostomy/Ileostomy/Ileal Conduit: No       Date of Last BM: 4-    Intake/Output Summary (Last 24 hours) at 4/6/2021 1104  Last data filed at 4/6/2021 1008  Gross per 24 hour   Intake 89 ml   Output 435 ml   Net -346 ml     I/O last 3 completed shifts:  In: -   Out: 425 [Urine:425]    Safety Concerns:      At Risk for Falls    Impairments/Disabilities:      Amputation - BLE; BKA    Nutrition Therapy:  Current Nutrition Therapy:   - Oral Diet:  Carb Control 4 carbs/meal (1800kcals/day)    Routes of Feeding: Oral  Liquids: No Restrictions  Daily Fluid Restriction: no  Last Modified Barium Swallow with Video (Video Swallowing Test): not done    Treatments at the Time of Hospital Discharge: Respiratory Treatments:   Oxygen Therapy:  is not on home oxygen therapy. Ventilator:    - No ventilator support    Rehab Therapies: Physical Therapy and Occupational Therapy  Weight Bearing Status/Restrictions: Non-weight bearing on left leg  Other Medical Equipment (for information only, NOT a DME order):  wheelchair  Other Treatments: Skilled nursing assessment  Med teaching and compliance  Wound vac lt stump dressing change M-W-F  CBC,BUN,CREAT,LFT's weekly while on antibiotics  PICC line care  Vanco and Cefepime IV until 5/16/21  Resume home care with [address] when discharged from SNF  Has wet to dry dressing to left knee; remove dressing, place wound vac. Has right prosthetic leg. Patient's personal belongings (please select all that are sent with patient):  None    RN SIGNATURE:  Electronically signed by Beverly Gaspar RN on 4/12/21 at 1:19 PM EDT    CASE MANAGEMENT/SOCIAL WORK SECTION    Inpatient Status Date: ***    Readmission Risk Assessment Score:  Readmission Risk              Risk of Unplanned Readmission:        76           Discharging to Facility/ Agency   · Name: [facility]  · Address:  · Phone:   · Fax:              / signature: Electronically signed by Hayley Quinn RN on 4/6/21 at 11:12 AM EDT    PHYSICIAN SECTION    Prognosis: Fair    Condition at Discharge: Stable    Rehab Potential (if transferring to Rehab): Fair    Recommended Labs or Other Treatments After Discharge: PT & OT eval and treat. CBC,BUN,CREAT,LFT's weekly while on antibiotics-prescription written by Dr. Harmony Shaw    Physician Certification: I certify the above information and transfer of Gaston Sher  is necessary for the continuing treatment of the diagnosis listed and that he requires [facility] for greater 30 days.      Update Admission H&P: No change in H&P    PHYSICIAN SIGNATURE:  Electronically signed by Nimco Gunderson DO on 4/9/21 at 10:12 AM EDT

## 2021-04-06 NOTE — BRIEF OP NOTE
Brief Postoperative Note      Patient: Charley Salinas  YOB: 1955  MRN: [id number]    Date of Procedure: 4/6/2021    Pre-Op Diagnosis: LEFT STUMP CELLULITIS/WOUND    Post-Op Diagnosis: Same       Procedure(s):  LEFT STUMP DEBRIDEMENT AND WASHOUT  PLACEMENT OF WOUND VAC    Surgeon(s):  Van Leary MD    Assistant:  * No surgical staff found *    Anesthesia: General    Estimated Blood Loss (mL): Minimal    Complications: None    Specimens:   * No specimens in log *    Implants:  * No implants in log *      Drains: * No LDAs found *    Findings: Healthy bleeding tissue post debridement and washout    Electronically signed by Van Leary MD on 4/6/2021 at 9:10 AM

## 2021-04-06 NOTE — PLAN OF CARE
Problem: Falls - Risk of:  Goal: Will remain free from falls  Description: Will remain free from falls  4/6/2021 0038 by Brianna Shrestha RN  Outcome: Ongoing  4/5/2021 1847 by Iantha Sandhoff, RN  Outcome: Ongoing  Goal: Absence of physical injury  Description: Absence of physical injury  4/6/2021 0038 by Brianna Shrestha RN  Outcome: Ongoing  4/5/2021 1847 by Iantha Sandhoff, RN  Outcome: Ongoing     Problem: Pain:  Goal: Pain level will decrease  Description: Pain level will decrease  Outcome: Ongoing  Goal: Control of acute pain  Description: Control of acute pain  Outcome: Ongoing  Goal: Control of chronic pain  Description: Control of chronic pain  Outcome: Ongoing     Problem: Skin Integrity:  Goal: Will show no infection signs and symptoms  Description: Will show no infection signs and symptoms  4/6/2021 0038 by Brianna Shrestha RN  Outcome: Ongoing  4/5/2021 1847 by Iantha Sandhoff, RN  Outcome: Ongoing  Goal: Absence of new skin breakdown  Description: Absence of new skin breakdown  4/6/2021 0038 by Brianna Shrestha RN  Outcome: Ongoing  4/5/2021 1847 by Iantha Sandhoff, RN  Outcome: Ongoing     Problem: Nutrition  Goal: Optimal nutrition therapy  Outcome: Ongoing

## 2021-04-06 NOTE — PROGRESS NOTES
Covid 19 swab taken from left nare, labeled, placed in red dot bag, and handed off to second healthcare worker outside of room for transport to laboratory per hospital policy and procedure. Patient tolerated procedure fairly well.

## 2021-04-07 ENCOUNTER — APPOINTMENT (OUTPATIENT)
Dept: CT IMAGING | Age: 66
DRG: 501 | End: 2021-04-07
Payer: MEDICARE

## 2021-04-07 LAB
ANION GAP SERPL CALCULATED.3IONS-SCNC: 12 MMOL/L (ref 9–17)
BUN BLDV-MCNC: 13 MG/DL (ref 8–23)
BUN/CREAT BLD: 14 (ref 9–20)
CALCIUM SERPL-MCNC: 8.2 MG/DL (ref 8.6–10.4)
CHLORIDE BLD-SCNC: 102 MMOL/L (ref 98–107)
CO2: 25 MMOL/L (ref 20–31)
CREAT SERPL-MCNC: 0.94 MG/DL (ref 0.7–1.2)
CULTURE: ABNORMAL
DIRECT EXAM: ABNORMAL
GFR AFRICAN AMERICAN: >60 ML/MIN
GFR NON-AFRICAN AMERICAN: >60 ML/MIN
GFR SERPL CREATININE-BSD FRML MDRD: ABNORMAL ML/MIN/{1.73_M2}
GFR SERPL CREATININE-BSD FRML MDRD: ABNORMAL ML/MIN/{1.73_M2}
GLUCOSE BLD-MCNC: 141 MG/DL (ref 75–110)
GLUCOSE BLD-MCNC: 189 MG/DL (ref 75–110)
GLUCOSE BLD-MCNC: 208 MG/DL (ref 70–99)
GLUCOSE BLD-MCNC: 224 MG/DL (ref 75–110)
GLUCOSE BLD-MCNC: 261 MG/DL (ref 75–110)
HCT VFR BLD CALC: 27.6 % (ref 40.7–50.3)
HEMOGLOBIN: 8 G/DL (ref 13–17)
Lab: ABNORMAL
MCH RBC QN AUTO: 24.1 PG (ref 25.2–33.5)
MCHC RBC AUTO-ENTMCNC: 29 G/DL (ref 28.4–34.8)
MCV RBC AUTO: 83.1 FL (ref 82.6–102.9)
NRBC AUTOMATED: 0 PER 100 WBC
PDW BLD-RTO: 15.6 % (ref 11.8–14.4)
PLATELET # BLD: 384 K/UL (ref 138–453)
PMV BLD AUTO: 9.8 FL (ref 8.1–13.5)
POTASSIUM SERPL-SCNC: 3.3 MMOL/L (ref 3.7–5.3)
PROSTATE SPECIFIC ANTIGEN: 2.08 UG/L
RBC # BLD: 3.32 M/UL (ref 4.21–5.77)
SODIUM BLD-SCNC: 139 MMOL/L (ref 135–144)
SPECIMEN DESCRIPTION: ABNORMAL
WBC # BLD: 9.5 K/UL (ref 3.5–11.3)

## 2021-04-07 PROCEDURE — 6360000002 HC RX W HCPCS: Performed by: SURGERY

## 2021-04-07 PROCEDURE — 6360000002 HC RX W HCPCS: Performed by: INTERNAL MEDICINE

## 2021-04-07 PROCEDURE — 94640 AIRWAY INHALATION TREATMENT: CPT

## 2021-04-07 PROCEDURE — 94761 N-INVAS EAR/PLS OXIMETRY MLT: CPT

## 2021-04-07 PROCEDURE — 2580000003 HC RX 258: Performed by: SURGERY

## 2021-04-07 PROCEDURE — 6370000000 HC RX 637 (ALT 250 FOR IP): Performed by: SURGERY

## 2021-04-07 PROCEDURE — 85027 COMPLETE CBC AUTOMATED: CPT

## 2021-04-07 PROCEDURE — 6370000000 HC RX 637 (ALT 250 FOR IP): Performed by: INTERNAL MEDICINE

## 2021-04-07 PROCEDURE — 99024 POSTOP FOLLOW-UP VISIT: CPT | Performed by: SURGERY

## 2021-04-07 PROCEDURE — 36415 COLL VENOUS BLD VENIPUNCTURE: CPT

## 2021-04-07 PROCEDURE — 82947 ASSAY GLUCOSE BLOOD QUANT: CPT

## 2021-04-07 PROCEDURE — 51798 US URINE CAPACITY MEASURE: CPT

## 2021-04-07 PROCEDURE — 80048 BASIC METABOLIC PNL TOTAL CA: CPT

## 2021-04-07 PROCEDURE — 84153 ASSAY OF PSA TOTAL: CPT

## 2021-04-07 PROCEDURE — 1200000000 HC SEMI PRIVATE

## 2021-04-07 PROCEDURE — 6370000000 HC RX 637 (ALT 250 FOR IP): Performed by: UROLOGY

## 2021-04-07 PROCEDURE — 99232 SBSQ HOSP IP/OBS MODERATE 35: CPT | Performed by: INTERNAL MEDICINE

## 2021-04-07 RX ORDER — TAMSULOSIN HYDROCHLORIDE 0.4 MG/1
0.8 CAPSULE ORAL ONCE
Status: COMPLETED | OUTPATIENT
Start: 2021-04-07 | End: 2021-04-07

## 2021-04-07 RX ADMIN — AMITRIPTYLINE HYDROCHLORIDE 75 MG: 25 TABLET, FILM COATED ORAL at 20:05

## 2021-04-07 RX ADMIN — TAMSULOSIN HYDROCHLORIDE 0.8 MG: 0.4 CAPSULE ORAL at 05:59

## 2021-04-07 RX ADMIN — POTASSIUM BICARBONATE 40 MEQ: 782 TABLET, EFFERVESCENT ORAL at 10:12

## 2021-04-07 RX ADMIN — CEFEPIME HYDROCHLORIDE 2000 MG: 2 INJECTION, POWDER, FOR SOLUTION INTRAVENOUS at 05:59

## 2021-04-07 RX ADMIN — SODIUM CHLORIDE, PRESERVATIVE FREE 10 ML: 5 INJECTION INTRAVENOUS at 08:19

## 2021-04-07 RX ADMIN — INSULIN LISPRO 1 UNITS: 100 INJECTION, SOLUTION INTRAVENOUS; SUBCUTANEOUS at 21:47

## 2021-04-07 RX ADMIN — OXYCODONE AND ACETAMINOPHEN 2 TABLET: 5; 325 TABLET ORAL at 18:20

## 2021-04-07 RX ADMIN — DOCUSATE SODIUM 50MG AND SENNOSIDES 8.6MG 2 TABLET: 8.6; 5 TABLET, FILM COATED ORAL at 08:18

## 2021-04-07 RX ADMIN — DOCUSATE SODIUM 50MG AND SENNOSIDES 8.6MG 2 TABLET: 8.6; 5 TABLET, FILM COATED ORAL at 20:05

## 2021-04-07 RX ADMIN — SODIUM CHLORIDE, PRESERVATIVE FREE 10 ML: 5 INJECTION INTRAVENOUS at 20:06

## 2021-04-07 RX ADMIN — OXYCODONE AND ACETAMINOPHEN 2 TABLET: 5; 325 TABLET ORAL at 14:14

## 2021-04-07 RX ADMIN — PROBIOTIC PRODUCT - TAB 1 TABLET: TAB at 08:18

## 2021-04-07 RX ADMIN — HYDROMORPHONE HYDROCHLORIDE 0.5 MG: 1 INJECTION, SOLUTION INTRAMUSCULAR; INTRAVENOUS; SUBCUTANEOUS at 20:05

## 2021-04-07 RX ADMIN — INSULIN LISPRO 2 UNITS: 100 INJECTION, SOLUTION INTRAVENOUS; SUBCUTANEOUS at 17:35

## 2021-04-07 RX ADMIN — HYDROMORPHONE HYDROCHLORIDE 0.5 MG: 1 INJECTION, SOLUTION INTRAMUSCULAR; INTRAVENOUS; SUBCUTANEOUS at 12:45

## 2021-04-07 RX ADMIN — HYDROMORPHONE HYDROCHLORIDE 0.5 MG: 1 INJECTION, SOLUTION INTRAMUSCULAR; INTRAVENOUS; SUBCUTANEOUS at 08:17

## 2021-04-07 RX ADMIN — INSULIN GLARGINE 25 UNITS: 100 INJECTION, SOLUTION SUBCUTANEOUS at 17:34

## 2021-04-07 RX ADMIN — FAMOTIDINE 20 MG: 20 TABLET ORAL at 20:05

## 2021-04-07 RX ADMIN — APIXABAN 5 MG: 5 TABLET, FILM COATED ORAL at 20:05

## 2021-04-07 RX ADMIN — APIXABAN 5 MG: 5 TABLET, FILM COATED ORAL at 08:18

## 2021-04-07 RX ADMIN — HYDROMORPHONE HYDROCHLORIDE 0.5 MG: 1 INJECTION, SOLUTION INTRAMUSCULAR; INTRAVENOUS; SUBCUTANEOUS at 16:00

## 2021-04-07 RX ADMIN — INSULIN LISPRO 1 UNITS: 100 INJECTION, SOLUTION INTRAVENOUS; SUBCUTANEOUS at 08:20

## 2021-04-07 RX ADMIN — CEFEPIME HYDROCHLORIDE 2000 MG: 2 INJECTION, POWDER, FOR SOLUTION INTRAVENOUS at 17:33

## 2021-04-07 RX ADMIN — FAMOTIDINE 20 MG: 20 TABLET ORAL at 08:18

## 2021-04-07 RX ADMIN — HYDROMORPHONE HYDROCHLORIDE 0.5 MG: 1 INJECTION, SOLUTION INTRAMUSCULAR; INTRAVENOUS; SUBCUTANEOUS at 04:01

## 2021-04-07 RX ADMIN — INSULIN LISPRO 1 UNITS: 100 INJECTION, SOLUTION INTRAVENOUS; SUBCUTANEOUS at 12:38

## 2021-04-07 RX ADMIN — VANCOMYCIN HYDROCHLORIDE 1000 MG: 1 INJECTION, POWDER, LYOPHILIZED, FOR SOLUTION INTRAVENOUS at 12:37

## 2021-04-07 RX ADMIN — BUDESONIDE AND FORMOTEROL FUMARATE DIHYDRATE 2 PUFF: 160; 4.5 AEROSOL RESPIRATORY (INHALATION) at 10:03

## 2021-04-07 RX ADMIN — PROBIOTIC PRODUCT - TAB 1 TABLET: TAB at 20:05

## 2021-04-07 RX ADMIN — VANCOMYCIN HYDROCHLORIDE 1000 MG: 1 INJECTION, POWDER, LYOPHILIZED, FOR SOLUTION INTRAVENOUS at 00:35

## 2021-04-07 RX ADMIN — OXYCODONE AND ACETAMINOPHEN 2 TABLET: 5; 325 TABLET ORAL at 10:12

## 2021-04-07 RX ADMIN — BUDESONIDE AND FORMOTEROL FUMARATE DIHYDRATE 2 PUFF: 160; 4.5 AEROSOL RESPIRATORY (INHALATION) at 19:46

## 2021-04-07 ASSESSMENT — PAIN DESCRIPTION - ORIENTATION
ORIENTATION: LEFT

## 2021-04-07 ASSESSMENT — PAIN DESCRIPTION - PAIN TYPE
TYPE: SURGICAL PAIN

## 2021-04-07 ASSESSMENT — PAIN DESCRIPTION - DESCRIPTORS
DESCRIPTORS: DISCOMFORT
DESCRIPTORS: ACHING;STABBING;THROBBING
DESCRIPTORS: ACHING;SHARP;SHOOTING;THROBBING
DESCRIPTORS: CONSTANT
DESCRIPTORS: CONSTANT

## 2021-04-07 ASSESSMENT — PAIN SCALES - GENERAL
PAINLEVEL_OUTOF10: 8
PAINLEVEL_OUTOF10: 5
PAINLEVEL_OUTOF10: 8

## 2021-04-07 ASSESSMENT — PAIN DESCRIPTION - FREQUENCY
FREQUENCY: CONTINUOUS

## 2021-04-07 ASSESSMENT — PAIN DESCRIPTION - PROGRESSION
CLINICAL_PROGRESSION: NOT CHANGED

## 2021-04-07 ASSESSMENT — PAIN DESCRIPTION - LOCATION
LOCATION: FOOT
LOCATION: LEG

## 2021-04-07 ASSESSMENT — PAIN DESCRIPTION - ONSET
ONSET: ON-GOING

## 2021-04-07 ASSESSMENT — PAIN - FUNCTIONAL ASSESSMENT: PAIN_FUNCTIONAL_ASSESSMENT: PREVENTS OR INTERFERES SOME ACTIVE ACTIVITIES AND ADLS

## 2021-04-07 NOTE — PROGRESS NOTES
Pharmacy Note  Vancomycin Consult - Brief Note     Vancomycin Day: 5  Current Dose:  1000mg q12h  Patient's labs, cultures, vitals, and vancomycin regimen reviewed. No changes today. Current diagnosis for which MRSA is suspected/confirmed: cellulitis  ONLY for suspected pneumonia or COPD: MRSA nasal swab  N/A: Non respiratory infection. .        TEMP: 98.8F    Recent Labs     04/07/21  0530   WBC 9.5     Recent Labs     04/06/21  0642 04/07/21  0530   CREATININE 0.88 0.94     Estimated Creatinine Clearance: 73 mL/min (based on SCr of 0.94 mg/dL). Intake/Output Summary (Last 24 hours) at 4/7/2021 1738  Last data filed at 4/7/2021 1553  Gross per 24 hour   Intake --   Output 1875 ml   Net -1875 ml         Thank you for the consult. Pharmacy will continue to follow.   Alfredo March demetrio/PharmD  4/7/2021 5:38 PM

## 2021-04-07 NOTE — PLAN OF CARE
Problem: Falls - Risk of:  Goal: Will remain free from falls  Description: Will remain free from falls  4/7/2021 0928 by Rafal Adhikari RN  Outcome: Ongoing  Note: Patient is a fall risk during this admission. Fall risk assessment was performed. Patient is absent of falls. Bed is in the lowest position. Wheels on the bed are locked. Call light and bed side table are within reach. Clutter is removed. Patient was educated to call out when needing assistance or wanting to get out of bed. Patient offered toileting assistance during rounding. Hourly rounds have been performed. Problem: Pain:  Goal: Pain level will decrease  Description: Pain level will decrease  4/7/2021 0928 by Rafal Adhikari RN  Outcome: Ongoing  Note: Pt medicated with pain medication prn. Assessed all pain characteristics including level, type, location, frequency, and onset. Non-pharmacologic interventions offered to pt as well. Pt states pain is tolerable at this time. Will continue to monitor      Problem: Skin Integrity:  Goal: Absence of new skin breakdown  Description: Absence of new skin breakdown  4/7/2021 0928 by Rafal Adhikari RN  Outcome: Ongoing  Note: Negative pressure therapy continued to left lower extremity stump. Small amount of serous-sang drainage noted to wound vac. Dressing remains clean,dry and intact. Redness and swelling continues to stump, unchanged at this time. Patient afebrile. IV antibiotics continued as ordered. Will continue to monitor. Problem: Serum Glucose Level - Abnormal:  Goal: Ability to maintain appropriate glucose levels will improve  Description: Ability to maintain appropriate glucose levels will improve  Outcome: Ongoing  Note: Patient's blood sugars continue to be checked before meals and before bed. Sliding scale insulin available for blood sugars 140 or greater. Physician updated as needed.       Problem: Tobacco Use:  Goal: Inpatient tobacco use cessation counseling participation  Description: Inpatient tobacco use cessation counseling participation  Outcome: Ongoing

## 2021-04-07 NOTE — PROGRESS NOTES
[facility]  Office: [address], DO, Alyson Reis, DO, Magda Mili, DO, Han Friedman Blood, DO, Gloria Woods MD, Mitzi Castrejon MD, Ashli Olson MD, Yael Ramsay MD, Son oNland MD, Frances Kee MD, Jalil Alejo MD, Shaar Morgan MD, Abimael Preez, DO, Evi Fox MD, Pancho De La Rosa DO, Devante Barclay MD,  Galilea Parks DO, Jitendra Pedraza MD, Lory Leyva MD, Consuelo Owens MD, Paty Rodrigez MD, Sharita Javed, Janusz Muller, CNP, Mare Medrano, CNP, Estefania Govea, [facility], Nehemias Montalvo, CNP, Bethel Hackett, CNP, Kiara Castellon, CNP, Krystal Angela, CNP, Rina Hewitt, CNP, CECY TamC, Alejandro Cabral, ANIYAH, Diana Calix, CNP, Nacho Bland, CNP, Adrian Monzon, CNP, Marina Perez, CNP, Diane Walsh, CNP, Angela Mahajan, [facility]    Progress Note    4/7/2021    2:19 PM    Name:   Charley Salinas  MRN:     [id number]     Acct:      [de-identified]   Room:   2004/2004-02  IP Day:  4  Admit Date:  4/3/2021  7:18 PM    PCP:   Rafal Chicas DO  Code Status:  Full Code    Subjective:     C/C:   Chief Complaint   Patient presents with    Leg Pain     Interval History Status: improved. Patient refused dailey catheter today despite urinary retention. Flomax was started, and he was able to urinate a little better. No more pain with urination.      + Pain in left LE after amp revision. Percocet and IV Dilaudid are helping control his pain. He still feels constipated. Brief History:     Per my partner:  \"[id number] presented with progressively worsening left leg pain and redness over the prior BKA stump. Patient underwent left BKA on February 9 for Dr. Delmar Coleman.   history includes diabetes, tobacco dependence, COPD, hyperlipidemia, GERD, marijuana use, history of esophageal cancer, PAD s/p left superficial femoral-popliteal artery, common and external iliac artery angioplasty and CREATININE 0.88 0.94  --    ANIONGAP  --  12  --    LABGLOM >60 >60  --    GFRAA >60 >60  --    CALCIUM  --  8.2*  --    PSA  --   --  2.08     Recent Labs     04/06/21  1204 04/06/21  1608 04/06/21  1822 04/06/21  2052 04/07/21  0631 04/07/21  1132   POCGLU 98 188* 175* 121* 189* 141*     ABG:  Lab Results   Component Value Date    FIO2 NOT REPORTED 12/18/2020     Lab Results   Component Value Date/Time    SPECIAL NOT REPORTED 04/03/2021 07:50 PM     Lab Results   Component Value Date/Time    CULTURE (A) 04/03/2021 07:50 PM     METHICILLIN RESISTANT STAPHYLOCOCCUS AUREUS MODERATE GROWTH    CULTURE PSEUDOMONAS AERUGINOSA MODERATE GROWTH (A) 04/03/2021 07:50 PM    CULTURE ENTEROBACTER CLOACAE LIGHT GROWTH (A) 04/03/2021 07:50 PM       Radiology:  Xr Tibia Fibula Left (2 Views)    Result Date: 4/3/2021  Left below-the-knee amputation deformity. No acute findings. Mri Tibia Fibula Left W Wo Contrast    Result Date: 4/5/2021  1. Status post left below-the-knee amputation with a soft tissue defect at the anterior distal aspect of the stump. Underlying soft tissue edema and area of irregular nonenhancing soft tissue extending to the margin of the tibial stump. Findings are compatible with cellulitis/myositis. No drainable fluid collection identified. 2. Significant marrow edema within the distal tibial stump concerning for osteomyelitis. Alternatively, postoperative edema is a possibility. 3. Mild marrow edema at the distal margin of the fibular stump likely postoperative. 4. Chronic healed fracture of the lateral tibial plateau. No convincing evidence of internal derangement. Physical Examination:        Pt refused.      Gen: A & O x 3, in pain    Assessment:        Hospital Problems           Last Modified POA    * (Principal) Cellulitis 4/3/2021 Yes    Type 2 diabetes mellitus with diabetic polyneuropathy, with long-term current use of insulin ([address] 4/3/2021 Yes    Diabetic polyneuropathy ([address] 4/3/2021 Yes    Tobacco dependence 4/3/2021 Yes    Edentulous 4/3/2021 Yes    Chronic obstructive pulmonary disease ([address] 4/3/2021 Yes    HLD (hyperlipidemia) 4/3/2021 Yes    Gastroesophageal reflux disease 4/3/2021 Yes    Marijuana use 4/3/2021 Yes    History of esophageal cancer 4/3/2021 Yes    PAD (peripheral artery disease) ([address] 4/3/2021 Yes    Carotid stenosis, asymptomatic, bilateral (Chronic) 4/3/2021 Yes    Below-knee amputation of right lower extremity ([address] (Chronic) 4/3/2021 Yes    Moderate malnutrition ([address] (Chronic) 4/3/2021 Yes          Plan:        1. LEFT BKA with wounds and cellulitis s/p wash out and wound vac placement- Con't IV abx per ID. Percocet prn and IV  Dilaudid 0.5mg q 3 hrs prn pain  2. DM2- uncontrolled, monitor and control BS, SSI  3. Chronic pain syndrome  4. Acute urinary retention- Flomax started, refused dailey,, will treat constipation too, Urology consult appreciated  5. PAD s/p angioplasty  6. Constipation- Senna-S BID, Dulcolax prn, may need to try a suppository  7. Gi proph  8. PT/OT  9. SW- pt wants to go home with home care?       Ang Barton MD  4/7/2021  2:19 PM
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
## 2021-04-07 NOTE — PROGRESS NOTES
Spoke with Dr. Faviola Matt, orders for Flomax were obtained. Dr. Faviola Matt will round on patient today.

## 2021-04-07 NOTE — PROGRESS NOTES
 COLONOSCOPY  01/26/2017    ESOPHAGECTOMY      cancer    FOOT DEBRIDEMENT Left 1/1/2021    I&D LEFT FOOT WITH REMOVAL OF NONVIABLE BONE AND SOFT TISSUE performed by Irineo Maria DPM at [facility] Left 1/5/2021    LEFT FOOT DEBRIDEMENT WITH REMOVAL ALL NON VIABLE SOFT TISSUE AND BONE performed by Irineo Maria DPM at [address] Right 11/03/2016    I & D heel    FOOT SURGERY Right 12/31/2016    I & D    FRACTURE SURGERY Left 9/5/2015    humerus left, left leg    IR INS PICC VAD W SQ PORT GREATER THAN 5  11/6/2020    IR INS PICC VAD W SQ PORT GREATER THAN 5 11/6/2020 [facility]    IR INS PICC VAD W SQ PORT GREATER THAN 5  1/11/2021    IR INS PICC VAD W SQ PORT GREATER THAN 5 1/11/2021 [facility]    LEG AMPUTATION BELOW KNEE Right 01/21/2017    LEG AMPUTATION BELOW KNEE Left 2/9/2021    LEFT  LEG AMPUTATION BELOW KNEE performed by Eve Rosenbaum MD at [facility] Left 4/6/2021    STUMP DEBRIDEMENT INCISION AND DRAINAGE performed by Eve Rosenbaum MD at [facility] Right 2014    rt 3rd through 5th digits    TOE AMPUTATION Left 5/26/2016    left foot 5th toe    TOE AMPUTATION Left 8/5/2020    FOOT TRANSMETATARSAL  AMPUTATION - AND LEFT PECUTANEOUS TENDO ACHILLES LENGTHENING performed by Mickie Wu DPM at [facility] TOE AMPUTATION Left 8/24/2020    REVISION  TRANSMETATARSAL AMPUTATION WITH DEBRIDEMENT. performed by Mickie Wu DPM at [facility]      5/14/13- with dilation    VASCULAR SURGERY Right 01/16/2017    foot guillotine amputation       Medications:      budesonide-formoterol  2 puff Inhalation BID    tamsulosin  0.4 mg Oral Daily    sennosides-docusate sodium  2 tablet Oral BID    cefepime  2,000 mg Intravenous Q12H    amitriptyline  75 mg Oral Nightly    apixaban  5 mg Oral BID    famotidine  20 mg Oral BID    refused    Intimate partner violence     Fear of current or ex partner: Not on file     Emotionally abused: Not on file     Physically abused: Not on file     Forced sexual activity: Not on file   Other Topics Concern    Not on file   Social History Narrative    Not on file       Family History:     Family History   Problem Relation Age of Onset    Diabetes Mother     Cancer Mother     Alcohol Abuse Father     Cancer Sister     Alcohol Abuse Maternal Aunt     Alcohol Abuse Maternal Uncle     Alcohol Abuse Paternal Aunt       Medical Decision Making:   I have independently reviewed/ordered the following labs:    CBC with Differential:   Recent Labs     04/07/21  0530   WBC 9.5   HGB 8.0*   HCT 27.6*        BMP:  Recent Labs     04/06/21  0642 04/07/21  0530   NA  --  139   K  --  3.3*   CL  --  102   CO2  --  25   BUN 15 13   CREATININE 0.88 0.94     Hepatic Function Panel: No results for input(s): PROT, LABALBU, BILIDIR, IBILI, BILITOT, ALKPHOS, ALT, AST in the last 72 hours. No results for input(s): RPR in the last 72 hours. No results for input(s): HIV in the last 72 hours. No results for input(s): BC in the last 72 hours. Lab Results   Component Value Date    CREATININE 0.94 04/07/2021    GLUCOSE 208 04/07/2021    GLUCOSE 129 05/02/2012       Detailed results: Thank you for allowing us to participate in the care of this patient. Please call with questions. This note is created with the assistance of a speech recognition program.  While intending to generate adocument that actually reflects the content of the visit, the document can still have some errors including those of syntax and sound a like substitutions which may escape proof reading. It such instances, actual meaningcan be extrapolated by contextual diversion.     Fernando Henry MD  Office: [phone]  Perfect serve / office [phone]

## 2021-04-07 NOTE — PROGRESS NOTES
Physician Progress Note      PATIENT:               Monda Holter  CSN #:                  [id number]  :                       1955  ADMIT DATE:       4/3/2021 7:18 PM  [address] [place] DATE:  RESPONDING  PROVIDER #:        Trenton Haney MD          QUERY TEXT:    Patient admitted with infected left BKA. Per Op note dated  noted   documentation of debridement. To accurately reflect the procedure performed   please document if debridement was excisional or non excisional and the   deepest depth of tissue removed as down to and including: The medical record reflects the following:  Risk Factors: infected left bka  Clinical Indicators:  op note: Necrotic tissue was then sharply debrided   from the two wound beds. The medial most wound bed appeared somewhat   superficial with the lateral wound bed probing to the level of the bone;   however, the bone did appear covered. Once the  necrotic tissue had been sharply removed, several areas were curetted with   good bleeding noted. Treatment: debridement as above, IV antibiotics, ID consult  Options provided:  -- Non excisional debridement of skin  -- Excisional debridement of skin  -- Non excisional debridement of subcutaneous tissue  -- Excisional debridement of subcutaneous tissue  -- Non excisional debridement of fascia  -- Excisional debridement of fascia  -- Non excisional debridement of muscle  -- Excisional debridement of muscle  -- Other - I will add my own diagnosis  -- Disagree - Not applicable / Not valid  -- Disagree - Clinically unable to determine / Unknown  -- Refer to Clinical Documentation Reviewer    PROVIDER RESPONSE TEXT:    Excisional debridement of muscle of left BKA was performed during procedure on   .     Query created by: Mica Alvarado on 2021 9:24 AM      Electronically signed by:  Trenton Haney MD 2021 10:11 AM

## 2021-04-07 NOTE — PROGRESS NOTES
Pt has been having trouble urinating. Today at the change of shift Dr. Carla Dumont placed orders to insert a dailey if patients PVR was greater than 300. Writer gave pt some time to urinate before bladder scanning patient. After Pt urinated about 50ml bladder scan showed 892ml in the bladder. Writer explained to pt that a dailey catheter would need to be inserted to drain the urine and patient refused. Pt stated \"having a tube stuck in me is the worst pain I have ever felt and I will not be going through that again\". Writer and nursing supervisor Chel Denny explained and educated patient at bedside on what will happen if catheter is not placed. Pt continues to refuse. Writer informed Bre Cristina NP of situation and an order was placed to consult Urology.

## 2021-04-07 NOTE — PROGRESS NOTES
Bladder scan showing 996ml Pt continues to refuse dailey catheter insertion. Education provided. Will continue to monitor.

## 2021-04-07 NOTE — FLOWSHEET NOTE
04/07/21 0840   Urine Assessment   Bladder Scan Volume (mL) 752 mL  (greater than)   $ Bladder scan $ Yes   Patient voided 175ml prior to scan

## 2021-04-07 NOTE — PLAN OF CARE
Problem: Falls - Risk of:  Goal: Will remain free from falls  Description: Will remain free from falls  4/7/2021 0111 by Brianna Shrestha RN  Outcome: Ongoing  4/6/2021 1400 by Iantha Sandhoff, RN  Outcome: Ongoing  Goal: Absence of physical injury  Description: Absence of physical injury  4/7/2021 0111 by Brianna Shrestha RN  Outcome: Ongoing  4/6/2021 1400 by Iantha Sandhoff, RN  Outcome: Ongoing     Problem: Pain:  Goal: Pain level will decrease  Description: Pain level will decrease  Outcome: Ongoing  Goal: Control of acute pain  Description: Control of acute pain  Outcome: Ongoing  Goal: Control of chronic pain  Description: Control of chronic pain  Outcome: Ongoing     Problem: Skin Integrity:  Goal: Will show no infection signs and symptoms  Description: Will show no infection signs and symptoms  4/7/2021 0111 by Brianna Shrestha RN  Outcome: Ongoing  4/6/2021 1442 by Iantha Sandhoff, RN  Outcome: Ongoing  4/6/2021 1400 by Iantha Sandhoff, RN  Outcome: Ongoing  Goal: Absence of new skin breakdown  Description: Absence of new skin breakdown  4/7/2021 0111 by Brianna Shrestha RN  Outcome: Ongoing  4/6/2021 1442 by Iantha Sandhoff, RN  Outcome: Ongoing  4/6/2021 1400 by Iantha Sandhoff, RN  Outcome: Ongoing     Problem: Nutrition  Goal: Optimal nutrition therapy  Outcome: Ongoing

## 2021-04-08 ENCOUNTER — APPOINTMENT (OUTPATIENT)
Dept: INTERVENTIONAL RADIOLOGY/VASCULAR | Age: 66
DRG: 501 | End: 2021-04-08
Payer: MEDICARE

## 2021-04-08 PROBLEM — L03.116 CELLULITIS OF LEFT LOWER EXTREMITY: Status: ACTIVE | Noted: 2021-04-03

## 2021-04-08 LAB
BUN BLDV-MCNC: 15 MG/DL (ref 8–23)
CREAT SERPL-MCNC: 0.93 MG/DL (ref 0.7–1.2)
GFR AFRICAN AMERICAN: >60 ML/MIN
GFR NON-AFRICAN AMERICAN: >60 ML/MIN
GFR SERPL CREATININE-BSD FRML MDRD: NORMAL ML/MIN/{1.73_M2}
GFR SERPL CREATININE-BSD FRML MDRD: NORMAL ML/MIN/{1.73_M2}
GLUCOSE BLD-MCNC: 230 MG/DL (ref 75–110)
GLUCOSE BLD-MCNC: 242 MG/DL (ref 75–110)
GLUCOSE BLD-MCNC: 277 MG/DL (ref 75–110)
GLUCOSE BLD-MCNC: 304 MG/DL (ref 75–110)

## 2021-04-08 PROCEDURE — 97163 PT EVAL HIGH COMPLEX 45 MIN: CPT

## 2021-04-08 PROCEDURE — 6360000002 HC RX W HCPCS: Performed by: INTERNAL MEDICINE

## 2021-04-08 PROCEDURE — 6370000000 HC RX 637 (ALT 250 FOR IP): Performed by: SURGERY

## 2021-04-08 PROCEDURE — 82947 ASSAY GLUCOSE BLOOD QUANT: CPT

## 2021-04-08 PROCEDURE — 84520 ASSAY OF UREA NITROGEN: CPT

## 2021-04-08 PROCEDURE — 99232 SBSQ HOSP IP/OBS MODERATE 35: CPT | Performed by: INTERNAL MEDICINE

## 2021-04-08 PROCEDURE — 6370000000 HC RX 637 (ALT 250 FOR IP): Performed by: INTERNAL MEDICINE

## 2021-04-08 PROCEDURE — 2580000003 HC RX 258: Performed by: SURGERY

## 2021-04-08 PROCEDURE — 6360000002 HC RX W HCPCS: Performed by: SURGERY

## 2021-04-08 PROCEDURE — 82565 ASSAY OF CREATININE: CPT

## 2021-04-08 PROCEDURE — 99233 SBSQ HOSP IP/OBS HIGH 50: CPT | Performed by: INTERNAL MEDICINE

## 2021-04-08 PROCEDURE — 97167 OT EVAL HIGH COMPLEX 60 MIN: CPT

## 2021-04-08 PROCEDURE — 36573 INSJ PICC RS&I 5 YR+: CPT | Performed by: RADIOLOGY

## 2021-04-08 PROCEDURE — 97535 SELF CARE MNGMENT TRAINING: CPT

## 2021-04-08 PROCEDURE — 99024 POSTOP FOLLOW-UP VISIT: CPT | Performed by: SURGERY

## 2021-04-08 PROCEDURE — 6370000000 HC RX 637 (ALT 250 FOR IP): Performed by: NURSE PRACTITIONER

## 2021-04-08 PROCEDURE — 36415 COLL VENOUS BLD VENIPUNCTURE: CPT

## 2021-04-08 PROCEDURE — C1751 CATH, INF, PER/CENT/MIDLINE: HCPCS

## 2021-04-08 PROCEDURE — 97530 THERAPEUTIC ACTIVITIES: CPT

## 2021-04-08 PROCEDURE — 1200000000 HC SEMI PRIVATE

## 2021-04-08 PROCEDURE — 05HY33Z INSERTION OF INFUSION DEVICE INTO UPPER VEIN, PERCUTANEOUS APPROACH: ICD-10-PCS | Performed by: INTERNAL MEDICINE

## 2021-04-08 RX ORDER — INSULIN GLARGINE 100 [IU]/ML
28 INJECTION, SOLUTION SUBCUTANEOUS
Status: DISCONTINUED | OUTPATIENT
Start: 2021-04-08 | End: 2021-04-12 | Stop reason: HOSPADM

## 2021-04-08 RX ADMIN — HYDROMORPHONE HYDROCHLORIDE 0.5 MG: 1 INJECTION, SOLUTION INTRAMUSCULAR; INTRAVENOUS; SUBCUTANEOUS at 18:34

## 2021-04-08 RX ADMIN — TAMSULOSIN HYDROCHLORIDE 0.4 MG: 0.4 CAPSULE ORAL at 08:57

## 2021-04-08 RX ADMIN — AMITRIPTYLINE HYDROCHLORIDE 75 MG: 25 TABLET, FILM COATED ORAL at 21:08

## 2021-04-08 RX ADMIN — SODIUM CHLORIDE, PRESERVATIVE FREE 10 ML: 5 INJECTION INTRAVENOUS at 22:17

## 2021-04-08 RX ADMIN — DOCUSATE SODIUM 50MG AND SENNOSIDES 8.6MG 2 TABLET: 8.6; 5 TABLET, FILM COATED ORAL at 21:08

## 2021-04-08 RX ADMIN — APIXABAN 5 MG: 5 TABLET, FILM COATED ORAL at 21:08

## 2021-04-08 RX ADMIN — PROBIOTIC PRODUCT - TAB 1 TABLET: TAB at 21:07

## 2021-04-08 RX ADMIN — INSULIN LISPRO 2 UNITS: 100 INJECTION, SOLUTION INTRAVENOUS; SUBCUTANEOUS at 08:57

## 2021-04-08 RX ADMIN — FAMOTIDINE 20 MG: 20 TABLET ORAL at 21:07

## 2021-04-08 RX ADMIN — HYDROMORPHONE HYDROCHLORIDE 0.5 MG: 1 INJECTION, SOLUTION INTRAMUSCULAR; INTRAVENOUS; SUBCUTANEOUS at 15:15

## 2021-04-08 RX ADMIN — OXYCODONE AND ACETAMINOPHEN 2 TABLET: 5; 325 TABLET ORAL at 00:06

## 2021-04-08 RX ADMIN — OXYCODONE AND ACETAMINOPHEN 2 TABLET: 5; 325 TABLET ORAL at 17:03

## 2021-04-08 RX ADMIN — BISACODYL 5 MG: 5 TABLET, COATED ORAL at 12:41

## 2021-04-08 RX ADMIN — OXYCODONE AND ACETAMINOPHEN 2 TABLET: 5; 325 TABLET ORAL at 12:40

## 2021-04-08 RX ADMIN — OXYCODONE AND ACETAMINOPHEN 2 TABLET: 5; 325 TABLET ORAL at 21:08

## 2021-04-08 RX ADMIN — INSULIN GLARGINE 28 UNITS: 100 INJECTION, SOLUTION SUBCUTANEOUS at 17:03

## 2021-04-08 RX ADMIN — HYDROMORPHONE HYDROCHLORIDE 0.5 MG: 1 INJECTION, SOLUTION INTRAMUSCULAR; INTRAVENOUS; SUBCUTANEOUS at 22:17

## 2021-04-08 RX ADMIN — PROBIOTIC PRODUCT - TAB 1 TABLET: TAB at 08:56

## 2021-04-08 RX ADMIN — BUDESONIDE AND FORMOTEROL FUMARATE DIHYDRATE 2 PUFF: 160; 4.5 AEROSOL RESPIRATORY (INHALATION) at 09:45

## 2021-04-08 RX ADMIN — VANCOMYCIN HYDROCHLORIDE 1000 MG: 1 INJECTION, POWDER, LYOPHILIZED, FOR SOLUTION INTRAVENOUS at 12:46

## 2021-04-08 RX ADMIN — VANCOMYCIN HYDROCHLORIDE 1000 MG: 1 INJECTION, POWDER, LYOPHILIZED, FOR SOLUTION INTRAVENOUS at 00:07

## 2021-04-08 RX ADMIN — DOCUSATE SODIUM 50MG AND SENNOSIDES 8.6MG 2 TABLET: 8.6; 5 TABLET, FILM COATED ORAL at 08:56

## 2021-04-08 RX ADMIN — SODIUM CHLORIDE, PRESERVATIVE FREE 10 ML: 5 INJECTION INTRAVENOUS at 09:03

## 2021-04-08 RX ADMIN — INSULIN LISPRO 2 UNITS: 100 INJECTION, SOLUTION INTRAVENOUS; SUBCUTANEOUS at 12:38

## 2021-04-08 RX ADMIN — PROMETHAZINE HYDROCHLORIDE 12.5 MG: 12.5 TABLET ORAL at 12:41

## 2021-04-08 RX ADMIN — FAMOTIDINE 20 MG: 20 TABLET ORAL at 08:56

## 2021-04-08 RX ADMIN — OXYCODONE AND ACETAMINOPHEN 2 TABLET: 5; 325 TABLET ORAL at 06:13

## 2021-04-08 RX ADMIN — APIXABAN 5 MG: 5 TABLET, FILM COATED ORAL at 08:56

## 2021-04-08 RX ADMIN — HYDROMORPHONE HYDROCHLORIDE 0.5 MG: 1 INJECTION, SOLUTION INTRAMUSCULAR; INTRAVENOUS; SUBCUTANEOUS at 08:57

## 2021-04-08 RX ADMIN — CEFEPIME HYDROCHLORIDE 2000 MG: 2 INJECTION, POWDER, FOR SOLUTION INTRAVENOUS at 06:13

## 2021-04-08 RX ADMIN — INSULIN LISPRO 2 UNITS: 100 INJECTION, SOLUTION INTRAVENOUS; SUBCUTANEOUS at 17:02

## 2021-04-08 RX ADMIN — CEFEPIME HYDROCHLORIDE 2000 MG: 2 INJECTION, POWDER, FOR SOLUTION INTRAVENOUS at 17:01

## 2021-04-08 RX ADMIN — HYDROMORPHONE HYDROCHLORIDE 0.5 MG: 1 INJECTION, SOLUTION INTRAMUSCULAR; INTRAVENOUS; SUBCUTANEOUS at 01:10

## 2021-04-08 RX ADMIN — INSULIN LISPRO 2 UNITS: 100 INJECTION, SOLUTION INTRAVENOUS; SUBCUTANEOUS at 21:07

## 2021-04-08 ASSESSMENT — PAIN DESCRIPTION - ONSET
ONSET: ON-GOING

## 2021-04-08 ASSESSMENT — PAIN DESCRIPTION - LOCATION
LOCATION: LEG

## 2021-04-08 ASSESSMENT — PAIN SCALES - GENERAL
PAINLEVEL_OUTOF10: 8
PAINLEVEL_OUTOF10: 8
PAINLEVEL_OUTOF10: 6
PAINLEVEL_OUTOF10: 6
PAINLEVEL_OUTOF10: 4
PAINLEVEL_OUTOF10: 4
PAINLEVEL_OUTOF10: 8
PAINLEVEL_OUTOF10: 6
PAINLEVEL_OUTOF10: 5
PAINLEVEL_OUTOF10: 8

## 2021-04-08 ASSESSMENT — PAIN DESCRIPTION - PAIN TYPE
TYPE: SURGICAL PAIN

## 2021-04-08 ASSESSMENT — PAIN - FUNCTIONAL ASSESSMENT
PAIN_FUNCTIONAL_ASSESSMENT: PREVENTS OR INTERFERES SOME ACTIVE ACTIVITIES AND ADLS

## 2021-04-08 ASSESSMENT — PAIN DESCRIPTION - PROGRESSION
CLINICAL_PROGRESSION: NOT CHANGED

## 2021-04-08 ASSESSMENT — PAIN DESCRIPTION - ORIENTATION
ORIENTATION: LEFT

## 2021-04-08 ASSESSMENT — PAIN DESCRIPTION - DESCRIPTORS
DESCRIPTORS: CONSTANT

## 2021-04-08 ASSESSMENT — PAIN DESCRIPTION - FREQUENCY
FREQUENCY: CONTINUOUS
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
## 2021-04-08 NOTE — PROGRESS NOTES
Pharmacy Note  Vancomycin Consult - Brief Note     Vancomycin Day: 6  Current Dose:  Vancomycin 1000 mg IVBP q12h  Patient's labs, cultures, vitals, and vancomycin regimen reviewed. No changes today. Current diagnosis for which MRSA is suspected/confirmed: Cellulitis  ONLY for suspected pneumonia or COPD: MRSA nasal swab  N/A: Non respiratory infection. .        TEMP: 98.4 F  Calculated CrCl based on IBW:  73.7 mL/min    Recent Labs     04/07/21  0530   WBC 9.5     Recent Labs     04/07/21  0530 04/08/21  0714   CREATININE 0.94 0.93     Estimated Creatinine Clearance: 74 mL/min (based on SCr of 0.93 mg/dL). Intake/Output Summary (Last 24 hours) at 4/8/2021 0847  Last data filed at 4/8/2021 0759  Gross per 24 hour   Intake 1768 ml   Output 2825 ml   Net -1057 ml     Thank you for the consult. Pharmacy will continue to follow.   Mitchell Bach [id number]  4/8/2021 8:47 AM

## 2021-04-08 NOTE — CARE COORDINATION
Social Work-Patient is requesting SNF for rehab. The Plan for Transition of Care is related to the following treatment goals: SNF with PT/OT and skilled nursing    The Patient and/or patient representative patient was provided with a choice of provider and agrees   with the discharge plan. [x] Yes [] No    Freedom of choice list was provided with basic dialogue that supports the patient's individualized plan of care/goals, treatment preferences and shares the quality data associated with the providers. [x] Yes [] No. Cassius is requesting [facility].  Sent referral. Rohan Cerda
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
## 2021-04-08 NOTE — PLAN OF CARE
Problem: Falls - Risk of:  Goal: Will remain free from falls  Description: Will remain free from falls  4/7/2021 2212 by Barbara Russell RN  Outcome: Ongoing  4/7/2021 0928 by Corina Huber RN  Outcome: Ongoing  Note: Patient is a fall risk during this admission. Fall risk assessment was performed. Patient is absent of falls. Bed is in the lowest position. Wheels on the bed are locked. Call light and bed side table are within reach. Clutter is removed. Patient was educated to call out when needing assistance or wanting to get out of bed. Patient offered toileting assistance during rounding. Hourly rounds have been performed. Goal: Absence of physical injury  Description: Absence of physical injury  4/7/2021 0928 by Corina Huber RN  Outcome: Ongoing     Problem: Pain:  Goal: Pain level will decrease  Description: Pain level will decrease  4/7/2021 2212 by Barbara Russell RN  Outcome: Ongoing  4/7/2021 0928 by Corina Huber RN  Outcome: Ongoing  Note: Pt medicated with pain medication prn. Assessed all pain characteristics including level, type, location, frequency, and onset. Non-pharmacologic interventions offered to pt as well. Pt states pain is tolerable at this time. Will continue to monitor   Goal: Control of acute pain  Description: Control of acute pain  4/7/2021 0928 by Corina Huber RN  Outcome: Ongoing  Goal: Control of chronic pain  Description: Control of chronic pain  4/7/2021 2212 by Barbara Russell RN  Outcome: Ongoing  4/7/2021 0928 by Corina Huber RN  Outcome: Ongoing     Problem: Skin Integrity:  Goal: Will show no infection signs and symptoms  Description: Will show no infection signs and symptoms  4/7/2021 0928 by Corina Huber RN  Outcome: Ongoing  Goal: Absence of new skin breakdown  Description: Absence of new skin breakdown  4/7/2021 0928 by Corina Huber RN  Outcome: Ongoing  Note: Negative pressure therapy continued to left lower extremity stump.  Small amount of serous-sang drainage noted to wound vac. Dressing remains clean,dry and intact. Redness and swelling continues to stump, unchanged at this time. Patient afebrile. IV antibiotics continued as ordered. Will continue to monitor. Problem: Nutrition  Goal: Optimal nutrition therapy  4/7/2021 2212 by Emmett Barker RN  Outcome: Ongoing  4/7/2021 0928 by Adan Paulson RN  Outcome: Ongoing     Problem: Body Temperature - Imbalanced:  Goal: Ability to maintain a body temperature in the normal range will improve  Description: Ability to maintain a body temperature in the normal range will improve  4/7/2021 0928 by Adan Paulson RN  Outcome: Ongoing     Problem: Serum Glucose Level - Abnormal:  Goal: Ability to maintain appropriate glucose levels will improve  Description: Ability to maintain appropriate glucose levels will improve  4/7/2021 2212 by Emmett Barker RN  Outcome: Ongoing  4/7/2021 0928 by Adan Paulson RN  Outcome: Ongoing  Note: Patient's blood sugars continue to be checked before meals and before bed. Sliding scale insulin available for blood sugars 140 or greater. Physician updated as needed.       Problem: Tobacco Use:  Goal: Inpatient tobacco use cessation counseling participation  Description: Inpatient tobacco use cessation counseling participation  4/7/2021 0928 by Adan Paulson RN  Outcome: Ongoing

## 2021-04-08 NOTE — CARE COORDINATION
Pt will need home IV ATB at D/C. Referral to [facility] with Magdiel and will run benefit. Awaiting scripts from ID.

## 2021-04-08 NOTE — PROGRESS NOTES
Johnie Geller   Urology Progress Note            Subjective: Follow-up urinary retention    Patient Vitals for the past 24 hrs:   BP Temp Temp src Pulse Resp SpO2   04/07/21 1947 -- -- -- -- 16 98 %   04/07/21 1820 (!) 113/58 98.6 °F (37 °C) Oral 96 16 97 %   04/07/21 1511 (!) 127/58 98.8 °F (37.1 °C) Oral 93 15 94 %   04/07/21 1134 127/64 98.6 °F (37 °C) Oral 90 15 92 %   04/07/21 0748 (!) 108/59 98.2 °F (36.8 °C) Oral 101 16 96 %       Intake/Output Summary (Last 24 hours) at 4/8/2021 0458  Last data filed at 4/8/2021 0358  Gross per 24 hour   Intake 1068 ml   Output 2650 ml   Net -1582 ml       Recent Labs     04/07/21  0530   WBC 9.5   HGB 8.0*   HCT 27.6*   MCV 83.1        Recent Labs     04/06/21  0642 04/07/21  0530   NA  --  139   K  --  3.3*   CL  --  102   CO2  --  25   BUN 15 13   CREATININE 0.88 0.94       No results for input(s): COLORU, PHUR, LABCAST, WBCUA, RBCUA, MUCUS, TRICHOMONAS, YEAST, BACTERIA, CLARITYU, SPECGRAV, LEUKOCYTESUR, UROBILINOGEN, BILIRUBINUR, BLOODU in the last 72 hours. Invalid input(s): NITRATE, GLUCOSEUKETONESUAMORPHOUS    Additional Lab/culture results:    Physical Exam: Patient refused CT scan yesterday he states he is voiding well he does not require anything.     Today the bladder scan demonstrates 400 mL or less, patient continues to void, he has no bladder distention no bladder pain    Interval Imaging Findings:    Impression:    Patient Active Problem List   Diagnosis    Type 2 diabetes mellitus with diabetic polyneuropathy, with long-term current use of insulin (HCC)    Neuropathic pain, leg    Diabetic polyneuropathy (HCC)    Allergic rhinitis    Tobacco dependence    Edentulous    Dysphagia    Lung nodules    Esophageal cancer (HCC)    Fracture of humerus, left, closed    Closed fracture of humerus    DM type 2 with diabetic peripheral neuropathy (HCC)    Chronic obstructive pulmonary disease ([facility] [address]    HLD ([facility]    Cellulitis       Plan:  We will monitor and follow-up conservatively    Tolu Ramos  4:58 AM 4/8/2021

## 2021-04-08 NOTE — PLAN OF CARE
Problem: Falls - Risk of:  Goal: Will remain free from falls  Description: Will remain free from falls  4/8/2021 1051 by Marlene Gilmore RN  Outcome: Ongoing  Note: Patient remains free from injuries and falls, appropriate measures in place        Problem: Pain:  Goal: Pain level will decrease  Description: Pain level will decrease  4/8/2021 1051 by Marlene Gilmore RN  Outcome: Ongoing  Note: Patient's pain well controlled with ordered pain medications and alternate therapies      Problem: Skin Integrity:  Goal: Will show no infection signs and symptoms  Description: Will show no infection signs and symptoms  Outcome: Ongoing  Note: Skin assessment performed. Patient turns self PRN. Pressure ulcer prevention being practiced. Problem: Body Temperature - Imbalanced:  Goal: Ability to maintain a body temperature in the normal range will improve  Description: Ability to maintain a body temperature in the normal range will improve  Outcome: Ongoing  Note: IV antibiotics continued as ordered. Vitals remain stable. Patient afebrile. Wound remains with some redness and swelling, unchanged at this time. Will continue to monitor.

## 2021-04-08 NOTE — CARE COORDINATION
Per nurse pt is requesting SNF. Kieran Coffey SW informed and she will discuss choices with pt. Therapy ordered.   F/U appointment scheduled with Dr. Susie Rose  4-27 at 9:45am.

## 2021-04-08 NOTE — PROGRESS NOTES
distress  Mental Status: oriented to person, place and time with normal affect  Neck: good carotid pulses, no JVD  Lungs: clear to auscultation bilaterally, normal effort  Heart: regular rate and rhythm, no murmur,  Abdomen: soft, non-tender, non-distended, bowel sounds present all four quadrants, no masses, hepatomegaly, splenomegaly or aortic enlargement  Extremities: Right BKA site healed, left wound VAC in place  Skin: no gross lesions, rashes, or induration    Assessment:   3 44-year-old male with left below-knee amputation site open wound/infection    Patient Active Problem List:     Type 2 diabetes mellitus with diabetic polyneuropathy, with long-term current use of insulin (HCC)     Neuropathic pain, leg     Diabetic polyneuropathy (HCC)     Allergic rhinitis     Tobacco dependence     Edentulous     Dysphagia     Lung nodules     Esophageal cancer (HCC)     Fracture of humerus, left, closed     Closed fracture of humerus     DM type 2 with diabetic peripheral neuropathy (HCC)     Chronic obstructive pulmonary disease (HCC)     HLD (hyperlipidemia)     Gastroesophageal reflux disease     Chronic pain syndrome     Marijuana use     History of colon polyps     Functional diarrhea     Sepsis due to methicillin resistant Staphylococcus aureus (MRSA) ([facility])     History of esophageal cancer     Osteomyelitis, chronic, ankle or foot ([facility])     PAD (peripheral artery disease) ([address]     Other pulmonary embolism without acute cor pulmonale (HCC)     Carotid stenosis, asymptomatic, bilateral     Lower limb amputation status     Fx humeral neck, right, closed, initial encounter     Transient hypotension     Constipation due to opioid therapy     Acute kidney injury ([address]     Diabetic ulcer of left midfoot associated with type 2 diabetes mellitus, with fat layer exposed ([address]     Complication of below knee amputation stump (HCC)     COPD exacerbation (HCC)     Hyponatremia     Pain, phantom limb (HCC)     Below-knee amputation of right lower extremity (HCC)     Moderate protein malnutrition ([address]     Essential hypertension     Uncontrolled type 2 diabetes mellitus with hyperglycemia ([address]     History of pulmonary embolism - 2017     Atelectasis     Uncontrolled type 2 diabetes mellitus with foot ulcer (HCC)     Azotemia     Anemia, normocytic normochromic     Drug-induced constipation     Osteomyelitis of metatarsals of left foot (HCC)     Diabetic foot infection ([address]     Noncompliance     Type 1 diabetes mellitus with diabetic foot infection ([address]     Acute osteomyelitis of left foot (HCC)     Cellulitis of left foot     Mild malnutrition (HCC)     Diabetic infection of left foot (HCC)     Hypocalcemia     Hypokalemia     Moderate malnutrition (HCC)     Diabetic ulcer of left midfoot associated with type 2 diabetes mellitus, with necrosis of bone ([address]     History of below knee amputation, right ([address]     Foot ulcer with fat layer exposed, left ([address]     Chronic refractory osteomyelitis of left foot (HCC)     Sepsis ([address]     Pyogenic inflammation of bone ([address]     Cellulitis      Plan:   1. Continue wound VAC  2. Continue IV antibiotics  3. Strict blood sugar control  4. [id number] [address] for discharge from a vascular standpoint  5.  Follow-up with the wound care center in 2 weeks    Electronically signed by Caryn Anderson MD on 4/8/2021 at 6:24 PM

## 2021-04-09 LAB
CULTURE: NORMAL
CULTURE: NORMAL
GLUCOSE BLD-MCNC: 210 MG/DL (ref 75–110)
GLUCOSE BLD-MCNC: 229 MG/DL (ref 75–110)
GLUCOSE BLD-MCNC: 246 MG/DL (ref 75–110)
GLUCOSE BLD-MCNC: 261 MG/DL (ref 75–110)
GLUCOSE BLD-MCNC: 265 MG/DL (ref 75–110)
Lab: NORMAL
Lab: NORMAL
SPECIMEN DESCRIPTION: NORMAL
SPECIMEN DESCRIPTION: NORMAL

## 2021-04-09 PROCEDURE — 6370000000 HC RX 637 (ALT 250 FOR IP): Performed by: INTERNAL MEDICINE

## 2021-04-09 PROCEDURE — 6360000002 HC RX W HCPCS: Performed by: SURGERY

## 2021-04-09 PROCEDURE — 2580000003 HC RX 258: Performed by: SURGERY

## 2021-04-09 PROCEDURE — 99024 POSTOP FOLLOW-UP VISIT: CPT | Performed by: SURGERY

## 2021-04-09 PROCEDURE — 6370000000 HC RX 637 (ALT 250 FOR IP): Performed by: SURGERY

## 2021-04-09 PROCEDURE — 97530 THERAPEUTIC ACTIVITIES: CPT

## 2021-04-09 PROCEDURE — 82947 ASSAY GLUCOSE BLOOD QUANT: CPT

## 2021-04-09 PROCEDURE — 97535 SELF CARE MNGMENT TRAINING: CPT

## 2021-04-09 PROCEDURE — 94640 AIRWAY INHALATION TREATMENT: CPT

## 2021-04-09 PROCEDURE — 99232 SBSQ HOSP IP/OBS MODERATE 35: CPT | Performed by: INTERNAL MEDICINE

## 2021-04-09 PROCEDURE — 1200000000 HC SEMI PRIVATE

## 2021-04-09 PROCEDURE — 6360000002 HC RX W HCPCS: Performed by: INTERNAL MEDICINE

## 2021-04-09 PROCEDURE — 6370000000 HC RX 637 (ALT 250 FOR IP): Performed by: NURSE PRACTITIONER

## 2021-04-09 RX ORDER — POLYETHYLENE GLYCOL 3350 17 G/17G
17 POWDER, FOR SOLUTION ORAL DAILY PRN
Qty: 527 G | Refills: 1 | DISCHARGE
Start: 2021-04-09 | End: 2021-05-09

## 2021-04-09 RX ORDER — TAMSULOSIN HYDROCHLORIDE 0.4 MG/1
0.4 CAPSULE ORAL DAILY
Qty: 30 CAPSULE | Refills: 3 | Status: ON HOLD | DISCHARGE
Start: 2021-04-10 | End: 2022-02-21

## 2021-04-09 RX ORDER — OXYCODONE HYDROCHLORIDE AND ACETAMINOPHEN 5; 325 MG/1; MG/1
1 TABLET ORAL EVERY 4 HOURS PRN
Refills: 0 | Status: SHIPPED | OUTPATIENT
Start: 2021-04-09 | End: 2021-04-12

## 2021-04-09 RX ORDER — SENNA AND DOCUSATE SODIUM 50; 8.6 MG/1; MG/1
2 TABLET, FILM COATED ORAL 2 TIMES DAILY
DISCHARGE
Start: 2021-04-09 | End: 2021-05-25 | Stop reason: ALTCHOICE

## 2021-04-09 RX ORDER — NICOTINE POLACRILEX 4 MG
15 LOZENGE BUCCAL PRN
Qty: 45 G | Refills: 1 | DISCHARGE
Start: 2021-04-09 | End: 2021-11-03

## 2021-04-09 RX ADMIN — HYDROMORPHONE HYDROCHLORIDE 0.5 MG: 1 INJECTION, SOLUTION INTRAMUSCULAR; INTRAVENOUS; SUBCUTANEOUS at 06:17

## 2021-04-09 RX ADMIN — OXYCODONE AND ACETAMINOPHEN 2 TABLET: 5; 325 TABLET ORAL at 13:00

## 2021-04-09 RX ADMIN — SODIUM CHLORIDE, PRESERVATIVE FREE 10 ML: 5 INJECTION INTRAVENOUS at 04:01

## 2021-04-09 RX ADMIN — OXYCODONE AND ACETAMINOPHEN 2 TABLET: 5; 325 TABLET ORAL at 08:40

## 2021-04-09 RX ADMIN — SODIUM CHLORIDE, PRESERVATIVE FREE 10 ML: 5 INJECTION INTRAVENOUS at 06:17

## 2021-04-09 RX ADMIN — HYDROMORPHONE HYDROCHLORIDE 0.5 MG: 1 INJECTION, SOLUTION INTRAMUSCULAR; INTRAVENOUS; SUBCUTANEOUS at 10:39

## 2021-04-09 RX ADMIN — FAMOTIDINE 20 MG: 20 TABLET ORAL at 08:37

## 2021-04-09 RX ADMIN — VANCOMYCIN HYDROCHLORIDE 1000 MG: 1 INJECTION, POWDER, LYOPHILIZED, FOR SOLUTION INTRAVENOUS at 00:49

## 2021-04-09 RX ADMIN — HYDROMORPHONE HYDROCHLORIDE 0.5 MG: 1 INJECTION, SOLUTION INTRAMUSCULAR; INTRAVENOUS; SUBCUTANEOUS at 14:43

## 2021-04-09 RX ADMIN — INSULIN LISPRO 1 UNITS: 100 INJECTION, SOLUTION INTRAVENOUS; SUBCUTANEOUS at 20:58

## 2021-04-09 RX ADMIN — SODIUM CHLORIDE, PRESERVATIVE FREE 10 ML: 5 INJECTION INTRAVENOUS at 05:14

## 2021-04-09 RX ADMIN — FAMOTIDINE 20 MG: 20 TABLET ORAL at 20:58

## 2021-04-09 RX ADMIN — TAMSULOSIN HYDROCHLORIDE 0.4 MG: 0.4 CAPSULE ORAL at 08:37

## 2021-04-09 RX ADMIN — PROBIOTIC PRODUCT - TAB 1 TABLET: TAB at 08:37

## 2021-04-09 RX ADMIN — SODIUM CHLORIDE, PRESERVATIVE FREE 10 ML: 5 INJECTION INTRAVENOUS at 21:01

## 2021-04-09 RX ADMIN — SODIUM CHLORIDE, PRESERVATIVE FREE 10 ML: 5 INJECTION INTRAVENOUS at 10:40

## 2021-04-09 RX ADMIN — SODIUM CHLORIDE, PRESERVATIVE FREE 10 ML: 5 INJECTION INTRAVENOUS at 00:49

## 2021-04-09 RX ADMIN — INSULIN GLARGINE 28 UNITS: 100 INJECTION, SOLUTION SUBCUTANEOUS at 17:56

## 2021-04-09 RX ADMIN — BUDESONIDE AND FORMOTEROL FUMARATE DIHYDRATE 2 PUFF: 160; 4.5 AEROSOL RESPIRATORY (INHALATION) at 19:16

## 2021-04-09 RX ADMIN — BUDESONIDE AND FORMOTEROL FUMARATE DIHYDRATE 2 PUFF: 160; 4.5 AEROSOL RESPIRATORY (INHALATION) at 09:11

## 2021-04-09 RX ADMIN — INSULIN LISPRO 2 UNITS: 100 INJECTION, SOLUTION INTRAVENOUS; SUBCUTANEOUS at 08:39

## 2021-04-09 RX ADMIN — DOCUSATE SODIUM 50MG AND SENNOSIDES 8.6MG 2 TABLET: 8.6; 5 TABLET, FILM COATED ORAL at 20:57

## 2021-04-09 RX ADMIN — HYDROMORPHONE HYDROCHLORIDE 0.5 MG: 1 INJECTION, SOLUTION INTRAMUSCULAR; INTRAVENOUS; SUBCUTANEOUS at 17:59

## 2021-04-09 RX ADMIN — INSULIN LISPRO 3 UNITS: 100 INJECTION, SOLUTION INTRAVENOUS; SUBCUTANEOUS at 17:56

## 2021-04-09 RX ADMIN — BISACODYL 5 MG: 5 TABLET, COATED ORAL at 20:58

## 2021-04-09 RX ADMIN — OXYCODONE AND ACETAMINOPHEN 2 TABLET: 5; 325 TABLET ORAL at 20:58

## 2021-04-09 RX ADMIN — APIXABAN 5 MG: 5 TABLET, FILM COATED ORAL at 20:58

## 2021-04-09 RX ADMIN — CEFEPIME HYDROCHLORIDE 2000 MG: 2 INJECTION, POWDER, FOR SOLUTION INTRAVENOUS at 05:13

## 2021-04-09 RX ADMIN — OXYCODONE AND ACETAMINOPHEN 2 TABLET: 5; 325 TABLET ORAL at 04:01

## 2021-04-09 RX ADMIN — PROBIOTIC PRODUCT - TAB 1 TABLET: TAB at 20:58

## 2021-04-09 RX ADMIN — CEFEPIME HYDROCHLORIDE 2000 MG: 2 INJECTION, POWDER, FOR SOLUTION INTRAVENOUS at 17:56

## 2021-04-09 RX ADMIN — SODIUM CHLORIDE, PRESERVATIVE FREE 10 ML: 5 INJECTION INTRAVENOUS at 08:37

## 2021-04-09 RX ADMIN — DOCUSATE SODIUM 50MG AND SENNOSIDES 8.6MG 2 TABLET: 8.6; 5 TABLET, FILM COATED ORAL at 12:59

## 2021-04-09 RX ADMIN — APIXABAN 5 MG: 5 TABLET, FILM COATED ORAL at 08:37

## 2021-04-09 RX ADMIN — VANCOMYCIN HYDROCHLORIDE 1000 MG: 1 INJECTION, POWDER, LYOPHILIZED, FOR SOLUTION INTRAVENOUS at 13:00

## 2021-04-09 RX ADMIN — HYDROMORPHONE HYDROCHLORIDE 0.5 MG: 1 INJECTION, SOLUTION INTRAMUSCULAR; INTRAVENOUS; SUBCUTANEOUS at 22:18

## 2021-04-09 RX ADMIN — INSULIN LISPRO 2 UNITS: 100 INJECTION, SOLUTION INTRAVENOUS; SUBCUTANEOUS at 12:56

## 2021-04-09 RX ADMIN — AMITRIPTYLINE HYDROCHLORIDE 75 MG: 25 TABLET, FILM COATED ORAL at 21:13

## 2021-04-09 ASSESSMENT — PAIN DESCRIPTION - PROGRESSION
CLINICAL_PROGRESSION: NOT CHANGED

## 2021-04-09 ASSESSMENT — PAIN SCALES - GENERAL
PAINLEVEL_OUTOF10: 8
PAINLEVEL_OUTOF10: 6
PAINLEVEL_OUTOF10: 7
PAINLEVEL_OUTOF10: 8

## 2021-04-09 ASSESSMENT — PAIN DESCRIPTION - ONSET
ONSET: ON-GOING

## 2021-04-09 ASSESSMENT — PAIN DESCRIPTION - FREQUENCY
FREQUENCY: CONTINUOUS

## 2021-04-09 ASSESSMENT — PAIN DESCRIPTION - DESCRIPTORS
DESCRIPTORS: ACHING
DESCRIPTORS: DISCOMFORT
DESCRIPTORS: ACHING
DESCRIPTORS: ACHING;DISCOMFORT

## 2021-04-09 ASSESSMENT — PAIN DESCRIPTION - LOCATION
LOCATION: KNEE

## 2021-04-09 ASSESSMENT — PAIN DESCRIPTION - ORIENTATION
ORIENTATION: LEFT

## 2021-04-09 ASSESSMENT — PAIN DESCRIPTION - PAIN TYPE
TYPE: SURGICAL PAIN

## 2021-04-09 ASSESSMENT — PAIN - FUNCTIONAL ASSESSMENT: PAIN_FUNCTIONAL_ASSESSMENT: PREVENTS OR INTERFERES WITH MANY ACTIVE NOT PASSIVE ACTIVITIES

## 2021-04-09 NOTE — PROGRESS NOTES
VASCULAR SURGERY  PROGRESS NOTE      4/9/2021 2:40 PM  Subjective:   Admit Date: 4/3/2021  PCP: Jessie Willard DO    Chief Complaint   Patient presents with    Leg Pain     Interval History: No complaints. Awaiting precertification for ECF. Diet: DIET CARB CONTROL; Dietary Nutrition Supplements: Low Calorie High Protein Supplement    Medications:   Scheduled Meds:   insulin glargine  28 Units Subcutaneous Dinner    budesonide-formoterol  2 puff Inhalation BID    tamsulosin  0.4 mg Oral Daily    sennosides-docusate sodium  2 tablet Oral BID    cefepime  2,000 mg Intravenous Q12H    amitriptyline  75 mg Oral Nightly    apixaban  5 mg Oral BID    famotidine  20 mg Oral BID    lactobacillus  1 tablet Oral BID    sodium chloride flush  10 mL Intravenous 2 times per day    insulin lispro  0-6 Units Subcutaneous TID WC    insulin lispro  0-3 Units Subcutaneous Nightly    vancomycin  1,000 mg Intravenous Q12H    vancomycin (VANCOCIN) intermittent dosing (placeholder)   Other RX Placeholder     Continuous Infusions:   dextrose      sodium chloride           Labs:   CBC:   Recent Labs     04/07/21  0530   WBC 9.5   HGB 8.0*        BMP:    Recent Labs     04/07/21  0530 04/08/21  0714     --    K 3.3*  --      --    CO2 25  --    BUN 13 15   CREATININE 0.94 0.93   GLUCOSE 208*  --      Hepatic: No results for input(s): AST, ALT, ALB, BILITOT, ALKPHOS in the last 72 hours. Troponin: Invalid input(s): TROPONIN  BNP: No results for input(s): BNP in the last 72 hours. Lipids: No results for input(s): CHOL, HDL in the last 72 hours. Invalid input(s): LDLCALCU  INR: No results for input(s): INR in the last 72 hours.     Objective:   Vitals: BP (!) 140/65   Pulse 97   Temp 97.7 °F (36.5 °C) (Oral)   Resp 18   Ht 6' (1.829 m)   Wt 160 lb 8 oz (72.8 kg)   SpO2 94%   BMI 21.77 kg/m²   General appearance: alert, cooperative and no distress  Mental Status: oriented to person, place and time with normal affect  Neck: good carotid pulses, no JVD  Lungs: clear to auscultation bilaterally, normal effort  Heart: regular rate and rhythm, no murmur,  Abdomen: soft, non-tender, non-distended, bowel sounds present all four quadrants, no masses, hepatomegaly, splenomegaly or aortic enlargement  Extremities: Right BKA site healed, left wound VAC in place  Skin: no gross lesions, rashes, or induration    Assessment:   3 29-year-old male with left below-knee amputation site open wound/infection    Patient Active Problem List:     Type 2 diabetes mellitus with diabetic polyneuropathy, with long-term current use of insulin ([facility])     Neuropathic pain, leg     Diabetic polyneuropathy (HCC)     Allergic rhinitis     Tobacco dependence     Edentulous     Dysphagia     Lung nodules     Esophageal cancer (HCC)     Fracture of humerus, left, closed     Closed fracture of humerus     DM type 2 with diabetic peripheral neuropathy (HCC)     Chronic obstructive pulmonary disease (HCC)     HLD (hyperlipidemia)     Gastroesophageal reflux disease     Chronic pain syndrome     Marijuana use     History of colon polyps     Functional diarrhea     Sepsis due to methicillin resistant Staphylococcus aureus (MRSA) ([facility])     History of esophageal cancer     Osteomyelitis, chronic, ankle or foot ([facility])     PAD (peripheral artery disease) ([address]     Other pulmonary embolism without acute cor pulmonale (HCC)     Carotid stenosis, asymptomatic, bilateral     Lower limb amputation status     Fx humeral neck, right, closed, initial encounter     Transient hypotension     Constipation due to opioid therapy     Acute kidney injury ([address]     Diabetic ulcer of left midfoot associated with type 2 diabetes mellitus, with fat layer exposed ([address]     Complication of below knee amputation stump (HCC)     COPD exacerbation (HCC)     Hyponatremia     Pain, phantom limb ([address]     Below-knee amputation of right lower extremity ([address] Moderate protein malnutrition (HCC)     Essential hypertension     Uncontrolled type 2 diabetes mellitus with hyperglycemia ([address]     History of pulmonary embolism - 2017     Atelectasis     Uncontrolled type 2 diabetes mellitus with foot ulcer (HCC)     Azotemia     Anemia, normocytic normochromic     Drug-induced constipation     Osteomyelitis of metatarsals of left foot (HCC)     Diabetic foot infection ([address]     Noncompliance     Type 1 diabetes mellitus with diabetic foot infection ([address]     Acute osteomyelitis of left foot (HCC)     Cellulitis of left foot     Mild malnutrition (HCC)     Diabetic infection of left foot (HCC)     Hypocalcemia     Hypokalemia     Moderate malnutrition (HCC)     Diabetic ulcer of left midfoot associated with type 2 diabetes mellitus, with necrosis of bone ([address]     History of below knee amputation, right ([address]     Foot ulcer with fat layer exposed, left ([address]     Chronic refractory osteomyelitis of left foot (HCC)     Sepsis ([address]     Pyogenic inflammation of bone ([address]     Cellulitis      Plan:   1. Continue wound VAC and change Monday Wednesday Friday  2. Continue IV antibiotics  3. Strict blood sugar control  4. Farhat Lacy for discharge from a vascular standpoint  5.  Follow-up with the wound care center in 2 weeks    Electronically signed by Viviana Leo MD on 4/9/2021 at 2:40 PM
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
## 2021-04-09 NOTE — PROGRESS NOTES
humerus    DM type 2 with diabetic peripheral neuropathy (HCC)    COPD without exacerbation (HCC)    HLD (hyperlipidemia)    Gastroesophageal reflux disease    Chronic pain syndrome    Marijuana use    History of colon polyps    Functional diarrhea    Sepsis due to methicillin resistant Staphylococcus aureus (MRSA) (HCC)    History of esophageal cancer    Osteomyelitis, chronic, ankle or foot ([address]    PAD (peripheral artery disease) (HCC)    Other pulmonary embolism without acute cor pulmonale (HCC)    Carotid stenosis, asymptomatic, bilateral    Lower limb amputation status    Fx humeral neck, right, closed, initial encounter    Transient hypotension    Constipation due to opioid therapy    Acute kidney injury ([address]    Diabetic ulcer of left midfoot associated with type 2 diabetes mellitus, with fat layer exposed ([address]    Complication of below knee amputation stump (HCC)    COPD exacerbation (HCC)    Hyponatremia    Pain, phantom limb ([address]    Below-knee amputation of right lower extremity ([address]    Moderate protein malnutrition ([address]    Essential hypertension    Uncontrolled type 2 diabetes mellitus with hyperglycemia (HCC)    History of pulmonary embolism - 2017    Atelectasis    Uncontrolled type 2 diabetes mellitus with foot ulcer (HCC)    Azotemia    Anemia, normocytic normochromic    Drug-induced constipation    Osteomyelitis of metatarsals of left foot (HCC)    Diabetic foot infection (HCC)    Noncompliance    Type 1 diabetes mellitus with diabetic foot infection (HCC)    Acute osteomyelitis of left foot (HCC)    Cellulitis of left foot    Mild malnutrition (HCC)    Diabetic infection of left foot (HCC)    Hypocalcemia    Hypokalemia    Moderate malnutrition (HCC)    Diabetic ulcer of left midfoot associated with type 2 diabetes mellitus, with necrosis of bone (HCC)    History of below knee amputation, right ([address]    Foot ulcer with fat layer exposed, left ([address]  Chronic refractory osteomyelitis of left foot (HCC)    Sepsis ([facility]    Pyogenic inflammation of bone (HCC)    Cellulitis of left lower extremity at BKA stump       Plan: Patient has refused catheterization refused CT imaging or ultrasonography    Luanne Medrano  4:51 PM 4/9/2021

## 2021-04-09 NOTE — PLAN OF CARE
Problem: Falls - Risk of:  Goal: Will remain free from falls  Description: Will remain free from falls  Outcome: Ongoing  Goal: Absence of physical injury  Description: Absence of physical injury  Outcome: Ongoing     Problem: Pain:  Goal: Pain level will decrease  Description: Pain level will decrease  Outcome: Ongoing  Goal: Control of acute pain  Description: Control of acute pain  Outcome: Ongoing  Goal: Control of chronic pain  Description: Control of chronic pain  Outcome: Ongoing     Problem: Skin Integrity:  Goal: Will show no infection signs and symptoms  Description: Will show no infection signs and symptoms  Outcome: Ongoing  Goal: Absence of new skin breakdown  Description: Absence of new skin breakdown  Outcome: Ongoing     Problem: Nutrition  Goal: Optimal nutrition therapy  Outcome: Ongoing     Problem:  Body Temperature - Imbalanced:  Goal: Ability to maintain a body temperature in the normal range will improve  Description: Ability to maintain a body temperature in the normal range will improve  Outcome: Ongoing     Problem: Serum Glucose Level - Abnormal:  Goal: Ability to maintain appropriate glucose levels will improve  Description: Ability to maintain appropriate glucose levels will improve  Outcome: Ongoing     Problem: Tobacco Use:  Goal: Inpatient tobacco use cessation counseling participation  Description: Inpatient tobacco use cessation counseling participation  Outcome: Ongoing     Problem: IP MOBILITY  Goal: LTG - patient will ambulate household distance  Outcome: Ongoing     Problem: IP BALANCE  Goal: LTG - Patient will maintain balance to allow for safe/functional mobility  Outcome: Ongoing
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
## 2021-04-09 NOTE — CARE COORDINATION
Augusto Su from [place] [facility] [address] informed pt will be going to SNF at D/C so wound vac and HC will not be needed at this time.
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
## 2021-04-09 NOTE — PROGRESS NOTES
Nutrition Assessment     Type and Reason for Visit: Reassess    Nutrition Recommendations/Plan:   1. Continue Carb Control diet  2. Start Ensure High Protein 3x/day  3. Monitor p.o intakes, wound healing status and constipation status     Nutrition Assessment:  Patient continues to have fairly good p.o intakes and eats at meals time. Patient is status post wound vac placement on left stump. Continue Carbohydrate Control diet and start Ensure High Protein 3x/day. Monitor p.o intakes, labs, wound healing status and constipation status. Malnutrition Assessment:  Malnutrition Status: No malnutrition    Estimated Daily Nutrient Needs:  Energy (kcal): 7479-6820 kcal (25-28 kcal/kg); Weight Used for Energy Requirements:  Adjusted(67.9 kg)     Protein (g): 81-95 gm (1.2-1.4 gm/kg); Weight Used for Protein Requirements:  Adjusted(67.9 kg)          Nutrition Related Findings: No edema. 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); Wound vac on left stump (4/6). Constipated      Current Nutrition Therapies:    DIET CARB CONTROL;   Dietary Nutrition Supplements: Low Calorie High Protein Supplement    Anthropometric Measures:  · Height: 6' (182.9 cm)  · Current Body Wt: 141 lb (64 kg)(160 lb on 4/9 is likely incorrect)   · BMI: 19.1    Nutrition Diagnosis:   · Increased nutrient needs related to increase demand for energy/nutrients as evidenced by wounds(left stump wound)      Nutrition Interventions:   Food and/or Nutrient Delivery:  Continue Current Diet, Start Oral Nutrition Supplement  Nutrition Education/Counseling:  Education not indicated   Coordination of Nutrition Care:  Continue to monitor while inpatient    Goals:  PO intakes are greater than 75% at meals       Nutrition Monitoring and Evaluation:   Food/Nutrient Intake Outcomes:  Food and Nutrient Intake, Supplement Intake  Physical Signs/Symptoms Outcomes:  Biochemical Data, Fluid Status or Edema, Skin, Weight     Discharge Planning:    Continue current diet, Continue Oral Nutrition Supplement           Dc Rutherford  MFN, RDN, LDN  Lead Clinical Dietitian  RD Office Phone [phone]

## 2021-04-09 NOTE — CARE COORDINATION
Social Work-[employer]) has not received precert. Please check on weekend by calling [phone] fax [phone]. HENS completed.  Rohan Cerda

## 2021-04-09 NOTE — PLAN OF CARE
Problem: Falls - Risk of:  Goal: Will remain free from falls  Description: Will remain free from falls  Outcome: Ongoing  Note: Siderails up x 2  Hourly rounding  Call light in reach  Instructed to call for assist before attempting out of bed. Remains free from falls and accidental injury at this time   Floor free from obstacles  Bed is locked and in lowest position  Adequate lighting provided  Bed alarm on, Red Falling star and Stay with Me signs posted      Goal: Absence of physical injury  Description: Absence of physical injury  Outcome: Ongoing     Problem: Pain:  Goal: Pain level will decrease  Description: Pain level will decrease  Outcome: Ongoing  Goal: Control of acute pain  Description: Control of acute pain  Outcome: Ongoing  Note: Pain level assessment complete. Patient educated on pain scale and control interventions  PRN pain medication given per patient request  Patient instructed to call out with new onset of pain or unrelieved pain    Goal: Control of chronic pain  Description: Control of chronic pain  Outcome: Ongoing     Problem: Skin Integrity:  Goal: Will show no infection signs and symptoms  Description: Will show no infection signs and symptoms  Outcome: Ongoing  Note: Checked for incontinence every 2 hours and prn. Pericare as needed. Assisted to reposition off back frequently.   Heels off bed with pillows.'  Goal: Absence of new skin breakdown  Description: Absence of new skin breakdown  Outcome: Ongoing

## 2021-04-10 ENCOUNTER — APPOINTMENT (OUTPATIENT)
Dept: GENERAL RADIOLOGY | Age: 66
DRG: 501 | End: 2021-04-10
Payer: MEDICARE

## 2021-04-10 LAB
BUN BLDV-MCNC: 15 MG/DL (ref 8–23)
CREAT SERPL-MCNC: 1 MG/DL (ref 0.7–1.2)
GFR AFRICAN AMERICAN: >60 ML/MIN
GFR NON-AFRICAN AMERICAN: >60 ML/MIN
GFR SERPL CREATININE-BSD FRML MDRD: NORMAL ML/MIN/{1.73_M2}
GFR SERPL CREATININE-BSD FRML MDRD: NORMAL ML/MIN/{1.73_M2}
GLUCOSE BLD-MCNC: 207 MG/DL (ref 75–110)
GLUCOSE BLD-MCNC: 248 MG/DL (ref 75–110)
GLUCOSE BLD-MCNC: 258 MG/DL (ref 75–110)
GLUCOSE BLD-MCNC: 341 MG/DL (ref 75–110)

## 2021-04-10 PROCEDURE — 82565 ASSAY OF CREATININE: CPT

## 2021-04-10 PROCEDURE — 94640 AIRWAY INHALATION TREATMENT: CPT

## 2021-04-10 PROCEDURE — 2580000003 HC RX 258: Performed by: SURGERY

## 2021-04-10 PROCEDURE — 6360000002 HC RX W HCPCS: Performed by: INTERNAL MEDICINE

## 2021-04-10 PROCEDURE — 82947 ASSAY GLUCOSE BLOOD QUANT: CPT

## 2021-04-10 PROCEDURE — 36415 COLL VENOUS BLD VENIPUNCTURE: CPT

## 2021-04-10 PROCEDURE — 99232 SBSQ HOSP IP/OBS MODERATE 35: CPT | Performed by: INTERNAL MEDICINE

## 2021-04-10 PROCEDURE — 6370000000 HC RX 637 (ALT 250 FOR IP): Performed by: SURGERY

## 2021-04-10 PROCEDURE — 6370000000 HC RX 637 (ALT 250 FOR IP): Performed by: INTERNAL MEDICINE

## 2021-04-10 PROCEDURE — 1200000000 HC SEMI PRIVATE

## 2021-04-10 PROCEDURE — 6360000002 HC RX W HCPCS: Performed by: SURGERY

## 2021-04-10 PROCEDURE — 84520 ASSAY OF UREA NITROGEN: CPT

## 2021-04-10 RX ADMIN — PROBIOTIC PRODUCT - TAB 1 TABLET: TAB at 08:50

## 2021-04-10 RX ADMIN — FAMOTIDINE 20 MG: 20 TABLET ORAL at 08:50

## 2021-04-10 RX ADMIN — INSULIN LISPRO 3 UNITS: 100 INJECTION, SOLUTION INTRAVENOUS; SUBCUTANEOUS at 08:51

## 2021-04-10 RX ADMIN — CEFEPIME HYDROCHLORIDE 2000 MG: 2 INJECTION, POWDER, FOR SOLUTION INTRAVENOUS at 18:56

## 2021-04-10 RX ADMIN — OXYCODONE AND ACETAMINOPHEN 2 TABLET: 5; 325 TABLET ORAL at 05:30

## 2021-04-10 RX ADMIN — OXYCODONE AND ACETAMINOPHEN 2 TABLET: 5; 325 TABLET ORAL at 22:49

## 2021-04-10 RX ADMIN — DOCUSATE SODIUM 50MG AND SENNOSIDES 8.6MG 2 TABLET: 8.6; 5 TABLET, FILM COATED ORAL at 08:50

## 2021-04-10 RX ADMIN — BUDESONIDE AND FORMOTEROL FUMARATE DIHYDRATE 2 PUFF: 160; 4.5 AEROSOL RESPIRATORY (INHALATION) at 09:53

## 2021-04-10 RX ADMIN — PROBIOTIC PRODUCT - TAB 1 TABLET: TAB at 20:25

## 2021-04-10 RX ADMIN — BUDESONIDE AND FORMOTEROL FUMARATE DIHYDRATE 2 PUFF: 160; 4.5 AEROSOL RESPIRATORY (INHALATION) at 21:02

## 2021-04-10 RX ADMIN — HYDROMORPHONE HYDROCHLORIDE 0.5 MG: 1 INJECTION, SOLUTION INTRAMUSCULAR; INTRAVENOUS; SUBCUTANEOUS at 23:53

## 2021-04-10 RX ADMIN — HYDROMORPHONE HYDROCHLORIDE 0.5 MG: 1 INJECTION, SOLUTION INTRAMUSCULAR; INTRAVENOUS; SUBCUTANEOUS at 04:18

## 2021-04-10 RX ADMIN — APIXABAN 5 MG: 5 TABLET, FILM COATED ORAL at 20:26

## 2021-04-10 RX ADMIN — VANCOMYCIN HYDROCHLORIDE 1000 MG: 1 INJECTION, POWDER, LYOPHILIZED, FOR SOLUTION INTRAVENOUS at 23:53

## 2021-04-10 RX ADMIN — VANCOMYCIN HYDROCHLORIDE 1000 MG: 1 INJECTION, POWDER, LYOPHILIZED, FOR SOLUTION INTRAVENOUS at 11:57

## 2021-04-10 RX ADMIN — INSULIN LISPRO 2 UNITS: 100 INJECTION, SOLUTION INTRAVENOUS; SUBCUTANEOUS at 11:57

## 2021-04-10 RX ADMIN — VANCOMYCIN HYDROCHLORIDE 1000 MG: 1 INJECTION, POWDER, LYOPHILIZED, FOR SOLUTION INTRAVENOUS at 00:02

## 2021-04-10 RX ADMIN — TAMSULOSIN HYDROCHLORIDE 0.4 MG: 0.4 CAPSULE ORAL at 08:50

## 2021-04-10 RX ADMIN — APIXABAN 5 MG: 5 TABLET, FILM COATED ORAL at 08:50

## 2021-04-10 RX ADMIN — DOCUSATE SODIUM 50MG AND SENNOSIDES 8.6MG 2 TABLET: 8.6; 5 TABLET, FILM COATED ORAL at 20:26

## 2021-04-10 RX ADMIN — HYDROMORPHONE HYDROCHLORIDE 0.5 MG: 1 INJECTION, SOLUTION INTRAMUSCULAR; INTRAVENOUS; SUBCUTANEOUS at 20:25

## 2021-04-10 RX ADMIN — FAMOTIDINE 20 MG: 20 TABLET ORAL at 20:26

## 2021-04-10 RX ADMIN — OXYCODONE AND ACETAMINOPHEN 2 TABLET: 5; 325 TABLET ORAL at 18:57

## 2021-04-10 RX ADMIN — INSULIN GLARGINE 28 UNITS: 100 INJECTION, SOLUTION SUBCUTANEOUS at 18:57

## 2021-04-10 RX ADMIN — AMITRIPTYLINE HYDROCHLORIDE 75 MG: 25 TABLET, FILM COATED ORAL at 20:26

## 2021-04-10 RX ADMIN — HYDROMORPHONE HYDROCHLORIDE 0.5 MG: 1 INJECTION, SOLUTION INTRAMUSCULAR; INTRAVENOUS; SUBCUTANEOUS at 13:25

## 2021-04-10 RX ADMIN — INSULIN LISPRO 2 UNITS: 100 INJECTION, SOLUTION INTRAVENOUS; SUBCUTANEOUS at 22:15

## 2021-04-10 RX ADMIN — INSULIN LISPRO 2 UNITS: 100 INJECTION, SOLUTION INTRAVENOUS; SUBCUTANEOUS at 18:57

## 2021-04-10 RX ADMIN — OXYCODONE AND ACETAMINOPHEN 2 TABLET: 5; 325 TABLET ORAL at 11:57

## 2021-04-10 RX ADMIN — OXYCODONE AND ACETAMINOPHEN 2 TABLET: 5; 325 TABLET ORAL at 01:37

## 2021-04-10 RX ADMIN — HYDROMORPHONE HYDROCHLORIDE 0.5 MG: 1 INJECTION, SOLUTION INTRAMUSCULAR; INTRAVENOUS; SUBCUTANEOUS at 08:50

## 2021-04-10 RX ADMIN — CEFEPIME HYDROCHLORIDE 2000 MG: 2 INJECTION, POWDER, FOR SOLUTION INTRAVENOUS at 05:25

## 2021-04-10 ASSESSMENT — PAIN - FUNCTIONAL ASSESSMENT
PAIN_FUNCTIONAL_ASSESSMENT: PREVENTS OR INTERFERES WITH ALL ACTIVE AND SOME PASSIVE ACTIVITIES

## 2021-04-10 ASSESSMENT — PAIN DESCRIPTION - LOCATION
LOCATION: KNEE

## 2021-04-10 ASSESSMENT — PAIN DESCRIPTION - ORIENTATION
ORIENTATION: LEFT

## 2021-04-10 ASSESSMENT — PAIN SCALES - GENERAL
PAINLEVEL_OUTOF10: 8

## 2021-04-10 ASSESSMENT — PAIN DESCRIPTION - ONSET
ONSET: ON-GOING

## 2021-04-10 ASSESSMENT — PAIN DESCRIPTION - PAIN TYPE
TYPE: SURGICAL PAIN

## 2021-04-10 ASSESSMENT — PAIN DESCRIPTION - DESCRIPTORS
DESCRIPTORS: ACHING

## 2021-04-10 ASSESSMENT — PAIN DESCRIPTION - FREQUENCY
FREQUENCY: CONTINUOUS

## 2021-04-10 ASSESSMENT — PAIN DESCRIPTION - PROGRESSION
CLINICAL_PROGRESSION: NOT CHANGED
CLINICAL_PROGRESSION: NOT CHANGED

## 2021-04-10 NOTE — PLAN OF CARE
Problem: Falls - Risk of:  Goal: Will remain free from falls  Description: Will remain free from falls  4/10/2021 0234 by Tierney Wu RN  Outcome: Ongoing  Problem: Pain:  Goal: Pain level will decrease  Description: Pain level will decrease  4/10/2021 0234 by Tierney Wu RN  Outcome: Ongoing     Problem: Pain:  Goal: Control of acute pain  Description: Control of acute pain  4/10/2021 0234 by Tierney Wu RN  Outcome: Ongoing     Problem: Skin Integrity:  Goal: Will show no infection signs and symptoms  Description: Will show no infection signs and symptoms  4/10/2021 0234 by Tierney Wu RN  Outcome: Ongoing     Problem: Skin Integrity:  Goal: Absence of new skin breakdown  Description: Absence of new skin breakdown  4/10/2021 0234 by Tierney Wu RN  Outcome: Ongoing     Problem: Nutrition  Goal: Optimal nutrition therapy  4/10/2021 0234 by Tierney Wu RN  Outcome: Ongoing     Problem: Serum Glucose Level - Abnormal:  Goal: Ability to maintain appropriate glucose levels will improve  Description: Ability to maintain appropriate glucose levels will improve  4/10/2021 0234 by Tierney Wu RN  Outcome: Ongoing

## 2021-04-10 NOTE — PROGRESS NOTES
Patient requesting IV Dilauded for pain and refusing to take oral Percocet's first. Writer educated patient on narcotic orders per physician that oral meds are t obe given before IV meds & that he must call nurse for them. Writer had been putting the times when patient can have meds on the board all evening. Patient agrees to take the Percocet's and will call if Dilaudid is needed for breakthrough pain after one hour if oral not effective.

## 2021-04-10 NOTE — PROGRESS NOTES
Patient continues to pull on wound vac by exiting bed unattended; safety re discussed. The patient ignores all instruction.

## 2021-04-10 NOTE — PROGRESS NOTES
Physical Therapy  Facility/Department: [facility]  Patient Refusal Note    DATE: 4/10/2021    NAME: Karen Sinclair  MRN: [id number]   : 1955    Patient not seen this date for Physical Therapy due to:  [] Blood transfusion in progress  [] Cancel by RN  [] Hemodialysis  [x]  Refusal by Patient -  The patient refused at 9 AM due to nausea and again at 1 PM citing pain and still not feeling well.  [] Spine Precautions   [] Strict Bedrest  [] Surgery  [] Testing      [] Other        [] PT being discontinued at this time. Patient independent. No further needs. [] PT being discontinued at this time as the patient has been transferred to hospice care. No further needs.     Marquette Collet, PTA

## 2021-04-10 NOTE — PROGRESS NOTES
[facility]  Office: [address], DO, Kimber Burch, DO, Dev [place], DO, Marlo Kanner Blood, DO, America Carrion MD, Rajni Mcgraw MD, Joselyn Hernandez MD, Aishwarya Charlton MD, Kelin Olivera MD, Nimco Ambrose MD, Alanis Sotelo MD, Jimmy Easley MD, Jolly Khalil DO, Lenard Orellana MD, Marlena Humphrey, DO, Mika Diaz MD,  Linda Wren DO, Maura Torres MD, Benigno Carmen MD, Taylor Willingham MD, Allan Blum MD, Anam Lundberg [facility], [facility], CNP, Sher Longoria, CNP, Jessie Diaz, CNS, Alonso Maier, CNP, Domo Ley, CNP, Maida Lacey, CNP, Damion Dejesus, CNP, Roger Batista, CNP, Salomón Sanchez PA-C, Edson Jones, [facility], Elvin Oconnor, CNP, Alvaro Valverde, CNP, Sukhi Pang, CNP, Giles Giang, CNP, Lucia Guzman, [facility], Chito Luke, [facility]    Progress Note    4/10/2021    8:21 AM    Name:   Gaston Sher  MRN:     [id number]     Acct:      [de-identified]   Room:   2004/2004-02  IP Day:  7  Admit Date:  4/3/2021  7:18 PM    PCP:   Cesar Cannon DO  Code Status:  Full Code    Subjective:     C/C:   Chief Complaint   Patient presents with    Leg Pain     Interval History Status: improved. Patient is resting comfortably, had a small BM yesterday and constipation is improving. Denies any chest pain,  nausea or vomiting. Continued pain at the stump site. Mild shortness of breath today with going from commode to bed. Brief History:     Per my partner:  \"[id number] presented with progressively worsening left leg pain and redness over the prior BKA stump.  Patient underwent left BKA on February 9 for Dr. Payal Suarez.   history includes diabetes, tobacco dependence, COPD, hyperlipidemia, GERD, marijuana use, history of esophageal cancer, PAD s/p left superficial femoral-popliteal artery, common and external iliac artery angioplasty and 7/2020\"        4/6/2021- Vascular surgery performed debridement and wound vac placement in the OR    On April with the plan was to discharge with home care as the patient has been refusing skilled facility. At the time of discharge patient declined home care and requested skilled placement    Review of Systems:     Constitutional:  negative for chills, fevers, sweats  Respiratory:  negative for cough, dyspnea on exertion, shortness of breath, wheezing  Cardiovascular:  negative for chest pain, chest pressure/discomfort, lower extremity edema, palpitations  Gastrointestinal:  negative for abdominal pain, constipation, diarrhea, nausea, vomiting  Neurological:  negative for dizziness, headache    Medications: Allergies:     Allergies   Allergen Reactions    Gabapentin Other (See Comments)     dizziness    Other        Current Meds:   Scheduled Meds:    insulin glargine  28 Units Subcutaneous Dinner    budesonide-formoterol  2 puff Inhalation BID    tamsulosin  0.4 mg Oral Daily    sennosides-docusate sodium  2 tablet Oral BID    cefepime  2,000 mg Intravenous Q12H    amitriptyline  75 mg Oral Nightly    apixaban  5 mg Oral BID    famotidine  20 mg Oral BID    lactobacillus  1 tablet Oral BID    sodium chloride flush  10 mL Intravenous 2 times per day    insulin lispro  0-6 Units Subcutaneous TID WC    insulin lispro  0-3 Units Subcutaneous Nightly    vancomycin  1,000 mg Intravenous Q12H    vancomycin (VANCOCIN) intermittent dosing (placeholder)   Other RX Placeholder     Continuous Infusions:    dextrose      sodium chloride       PRN Meds: oxyCODONE-acetaminophen, oxyCODONE-acetaminophen, albuterol sulfate HFA, bisacodyl, HYDROmorphone, glucose, dextrose, glucagon (rDNA), dextrose, sodium chloride flush, sodium chloride, potassium chloride **OR** potassium alternative oral replacement **OR** potassium chloride, magnesium sulfate, promethazine **OR** ondansetron, polyethylene glycol, nicotine, acetaminophen **OR** acetaminophen, hydrOXYzine    Data:     Past Recent Labs     04/09/21  0614 04/09/21  1106 04/09/21  1627 04/09/21  1635 04/09/21  2037 04/10/21  0614   POCGLU 246* 210* 261* 265* 229* 258*     ABG:  Lab Results   Component Value Date    FIO2 NOT REPORTED 12/18/2020     Lab Results   Component Value Date/Time    SPECIAL NOT REPORTED 04/03/2021 07:50 PM     Lab Results   Component Value Date/Time    CULTURE (A) 04/03/2021 07:50 PM     METHICILLIN RESISTANT STAPHYLOCOCCUS AUREUS MODERATE GROWTH    CULTURE PSEUDOMONAS AERUGINOSA MODERATE GROWTH (A) 04/03/2021 07:50 PM    CULTURE ENTEROBACTER CLOACAE LIGHT GROWTH (A) 04/03/2021 07:50 PM       Radiology:  Xr Tibia Fibula Left (2 Views)    Result Date: 4/3/2021  Left below-the-knee amputation deformity. No acute findings. Mri Tibia Fibula Left W Wo Contrast    Result Date: 4/5/2021  1. Status post left below-the-knee amputation with a soft tissue defect at the anterior distal aspect of the stump. Underlying soft tissue edema and area of irregular nonenhancing soft tissue extending to the margin of the tibial stump. Findings are compatible with cellulitis/myositis. No drainable fluid collection identified. 2. Significant marrow edema within the distal tibial stump concerning for osteomyelitis. Alternatively, postoperative edema is a possibility. 3. Mild marrow edema at the distal margin of the fibular stump likely postoperative. 4. Chronic healed fracture of the lateral tibial plateau. No convincing evidence of internal derangement.      Ir Insert Picc Vad W Sq Port >5 Years    Result Date: 4/8/2021  Successful ultrasound and fluoroscopy guided PICC placement       Physical Examination:        General appearance:  alert, cooperative and no distress  Mental Status:  oriented to person, place and time and normal affect  Lungs:  clear to auscultation bilaterally, normal effort  Heart:  regular rate and rhythm, no murmur  Abdomen:  soft, nontender, nondistended, normal bowel sounds, no masses, hepatomegaly,

## 2021-04-11 LAB
GLUCOSE BLD-MCNC: 185 MG/DL (ref 75–110)
GLUCOSE BLD-MCNC: 209 MG/DL (ref 75–110)
GLUCOSE BLD-MCNC: 228 MG/DL (ref 75–110)
GLUCOSE BLD-MCNC: 305 MG/DL (ref 75–110)

## 2021-04-11 PROCEDURE — 6370000000 HC RX 637 (ALT 250 FOR IP): Performed by: INTERNAL MEDICINE

## 2021-04-11 PROCEDURE — 99232 SBSQ HOSP IP/OBS MODERATE 35: CPT | Performed by: INTERNAL MEDICINE

## 2021-04-11 PROCEDURE — 6360000002 HC RX W HCPCS: Performed by: INTERNAL MEDICINE

## 2021-04-11 PROCEDURE — 94640 AIRWAY INHALATION TREATMENT: CPT

## 2021-04-11 PROCEDURE — 6360000002 HC RX W HCPCS: Performed by: SURGERY

## 2021-04-11 PROCEDURE — 2580000003 HC RX 258: Performed by: SURGERY

## 2021-04-11 PROCEDURE — 6370000000 HC RX 637 (ALT 250 FOR IP): Performed by: SURGERY

## 2021-04-11 PROCEDURE — 97110 THERAPEUTIC EXERCISES: CPT

## 2021-04-11 PROCEDURE — 1200000000 HC SEMI PRIVATE

## 2021-04-11 PROCEDURE — 82947 ASSAY GLUCOSE BLOOD QUANT: CPT

## 2021-04-11 RX ORDER — TRAZODONE HYDROCHLORIDE 50 MG/1
50 TABLET ORAL NIGHTLY PRN
Status: DISCONTINUED | OUTPATIENT
Start: 2021-04-11 | End: 2021-04-12 | Stop reason: HOSPADM

## 2021-04-11 RX ADMIN — SODIUM CHLORIDE, PRESERVATIVE FREE 10 ML: 5 INJECTION INTRAVENOUS at 21:35

## 2021-04-11 RX ADMIN — VANCOMYCIN HYDROCHLORIDE 1000 MG: 1 INJECTION, POWDER, LYOPHILIZED, FOR SOLUTION INTRAVENOUS at 12:17

## 2021-04-11 RX ADMIN — HYDROMORPHONE HYDROCHLORIDE 0.5 MG: 1 INJECTION, SOLUTION INTRAMUSCULAR; INTRAVENOUS; SUBCUTANEOUS at 23:09

## 2021-04-11 RX ADMIN — APIXABAN 5 MG: 5 TABLET, FILM COATED ORAL at 08:05

## 2021-04-11 RX ADMIN — AMITRIPTYLINE HYDROCHLORIDE 75 MG: 25 TABLET, FILM COATED ORAL at 21:34

## 2021-04-11 RX ADMIN — INSULIN LISPRO 1 UNITS: 100 INJECTION, SOLUTION INTRAVENOUS; SUBCUTANEOUS at 08:04

## 2021-04-11 RX ADMIN — CEFEPIME HYDROCHLORIDE 2000 MG: 2 INJECTION, POWDER, FOR SOLUTION INTRAVENOUS at 17:40

## 2021-04-11 RX ADMIN — HYDROMORPHONE HYDROCHLORIDE 0.5 MG: 1 INJECTION, SOLUTION INTRAMUSCULAR; INTRAVENOUS; SUBCUTANEOUS at 18:52

## 2021-04-11 RX ADMIN — HYDROMORPHONE HYDROCHLORIDE 0.5 MG: 1 INJECTION, SOLUTION INTRAMUSCULAR; INTRAVENOUS; SUBCUTANEOUS at 15:10

## 2021-04-11 RX ADMIN — OXYCODONE AND ACETAMINOPHEN 2 TABLET: 5; 325 TABLET ORAL at 06:49

## 2021-04-11 RX ADMIN — INSULIN LISPRO 2 UNITS: 100 INJECTION, SOLUTION INTRAVENOUS; SUBCUTANEOUS at 12:17

## 2021-04-11 RX ADMIN — HYDROMORPHONE HYDROCHLORIDE 0.5 MG: 1 INJECTION, SOLUTION INTRAMUSCULAR; INTRAVENOUS; SUBCUTANEOUS at 03:55

## 2021-04-11 RX ADMIN — FAMOTIDINE 20 MG: 20 TABLET ORAL at 08:05

## 2021-04-11 RX ADMIN — CEFEPIME HYDROCHLORIDE 2000 MG: 2 INJECTION, POWDER, FOR SOLUTION INTRAVENOUS at 06:49

## 2021-04-11 RX ADMIN — OXYCODONE AND ACETAMINOPHEN 2 TABLET: 5; 325 TABLET ORAL at 12:18

## 2021-04-11 RX ADMIN — OXYCODONE AND ACETAMINOPHEN 2 TABLET: 5; 325 TABLET ORAL at 02:53

## 2021-04-11 RX ADMIN — OXYCODONE AND ACETAMINOPHEN 2 TABLET: 5; 325 TABLET ORAL at 21:53

## 2021-04-11 RX ADMIN — APIXABAN 5 MG: 5 TABLET, FILM COATED ORAL at 21:33

## 2021-04-11 RX ADMIN — HYDROMORPHONE HYDROCHLORIDE 0.5 MG: 1 INJECTION, SOLUTION INTRAMUSCULAR; INTRAVENOUS; SUBCUTANEOUS at 08:02

## 2021-04-11 RX ADMIN — PROBIOTIC PRODUCT - TAB 1 TABLET: TAB at 08:06

## 2021-04-11 RX ADMIN — DOCUSATE SODIUM 50MG AND SENNOSIDES 8.6MG 2 TABLET: 8.6; 5 TABLET, FILM COATED ORAL at 21:34

## 2021-04-11 RX ADMIN — INSULIN GLARGINE 28 UNITS: 100 INJECTION, SOLUTION SUBCUTANEOUS at 17:39

## 2021-04-11 RX ADMIN — INSULIN LISPRO 4 UNITS: 100 INJECTION, SOLUTION INTRAVENOUS; SUBCUTANEOUS at 17:40

## 2021-04-11 RX ADMIN — BUDESONIDE AND FORMOTEROL FUMARATE DIHYDRATE 2 PUFF: 160; 4.5 AEROSOL RESPIRATORY (INHALATION) at 10:12

## 2021-04-11 RX ADMIN — OXYCODONE AND ACETAMINOPHEN 2 TABLET: 5; 325 TABLET ORAL at 17:41

## 2021-04-11 RX ADMIN — INSULIN LISPRO 1 UNITS: 100 INJECTION, SOLUTION INTRAVENOUS; SUBCUTANEOUS at 21:33

## 2021-04-11 RX ADMIN — PROBIOTIC PRODUCT - TAB 1 TABLET: TAB at 21:34

## 2021-04-11 RX ADMIN — FAMOTIDINE 20 MG: 20 TABLET ORAL at 21:34

## 2021-04-11 ASSESSMENT — PAIN DESCRIPTION - FREQUENCY
FREQUENCY: CONTINUOUS

## 2021-04-11 ASSESSMENT — PAIN DESCRIPTION - PAIN TYPE
TYPE: SURGICAL PAIN

## 2021-04-11 ASSESSMENT — PAIN SCALES - GENERAL
PAINLEVEL_OUTOF10: 6
PAINLEVEL_OUTOF10: 8
PAINLEVEL_OUTOF10: 6
PAINLEVEL_OUTOF10: 6

## 2021-04-11 ASSESSMENT — ENCOUNTER SYMPTOMS
ALLERGIC/IMMUNOLOGIC NEGATIVE: 1
COLOR CHANGE: 1
RESPIRATORY NEGATIVE: 1
GASTROINTESTINAL NEGATIVE: 1

## 2021-04-11 ASSESSMENT — PAIN DESCRIPTION - ONSET
ONSET: ON-GOING

## 2021-04-11 ASSESSMENT — PAIN DESCRIPTION - DESCRIPTORS
DESCRIPTORS: ACHING
DESCRIPTORS: ACHING
DESCRIPTORS: ACHING;SHARP

## 2021-04-11 ASSESSMENT — PAIN DESCRIPTION - LOCATION
LOCATION: KNEE
LOCATION: KNEE

## 2021-04-11 ASSESSMENT — PAIN DESCRIPTION - ORIENTATION
ORIENTATION: LEFT
ORIENTATION: LEFT
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
## 2021-04-11 NOTE — PROGRESS NOTES
[facility]  Office: [address], DO, Varun Eliu, DO, Marci Hdez, DO, Nancy Castanedaliang Chiu, DO, Erik Vogel MD, Flip Parra MD, Janel Rocha MD, Jessica Culver MD, Lakia De La Torre MD, Bethany Nunez MD, Edith Dukes MD, Marguerite Rene MD, Raghu Driscoll DO, Krish Daigle MD, Mile Barton DO, June Ramon MD,  Augustus Moralez DO, Fara Hernandez MD, Pepper London MD, Yogi Sanches MD, Aileen West MD, Emigdio Zacarias, [facility], [facility], CNP, Tony Joseph, CNP, Hanane Colin, CNS, Misael Fry, CNP, Deandre Mata, CNP, Fam Berman, CNP, Grant Mendieta, CNP, Perry Clark, CNP, Broderick Villatoro PA-C, Meghan Solis, [facility], Karla Carrillo, CNP, Harpal Mohr, CNP, Mayra Felix, CNP, Lacinda Frankel, CNP, Maday Epperson, [facility], [facility]    Progress Note    4/11/2021    12:32 PM    Name:   Gonzalo Mcclellan  MRN:     [id number]     Acct:      [de-identified]   Room:   2004/2004-02  IP Day:  8  Admit Date:  4/3/2021  7:18 PM    PCP:   Reji Mccain DO  Code Status:  Full Code    Subjective:     C/C:   Chief Complaint   Patient presents with    Leg Pain     Interval History Status: improved. Feels better  Denies cp/sob/n/v  Has VAC on left bka stump wound    Brief History:     Per my partner:  \"[id number] presented with progressively worsening left leg pain and redness over the prior BKA stump.  Patient underwent left BKA on February 9 for Dr. Mary Rai.   history includes diabetes, tobacco dependence, COPD, hyperlipidemia, GERD, marijuana use, history of esophageal cancer, PAD s/p left superficial femoral-popliteal artery, common and external iliac artery angioplasty and 7/2020\"        4/6/2021- Vascular surgery performed debridement and wound vac placement in the OR\"    Review of Systems:     Constitutional:  negative for chills, fevers, sweats  Respiratory:  negative for cough, dyspnea on exertion, shortness of breath, wheezing  Cardiovascular:  negative for chest pain, chest pressure/discomfort, palpitations  Gastrointestinal:  negative for abdominal pain, constipation, diarrhea, nausea, vomiting  Neurological:  negative for dizziness, headache    Medications: Allergies:     Allergies   Allergen Reactions    Gabapentin Other (See Comments)     dizziness    Other        Current Meds:   Scheduled Meds:    insulin glargine  28 Units Subcutaneous Dinner    budesonide-formoterol  2 puff Inhalation BID    tamsulosin  0.4 mg Oral Daily    sennosides-docusate sodium  2 tablet Oral BID    cefepime  2,000 mg Intravenous Q12H    amitriptyline  75 mg Oral Nightly    apixaban  5 mg Oral BID    famotidine  20 mg Oral BID    lactobacillus  1 tablet Oral BID    sodium chloride flush  10 mL Intravenous 2 times per day    insulin lispro  0-6 Units Subcutaneous TID WC    insulin lispro  0-3 Units Subcutaneous Nightly    vancomycin  1,000 mg Intravenous Q12H    vancomycin (VANCOCIN) intermittent dosing (placeholder)   Other RX Placeholder     Continuous Infusions:    dextrose      sodium chloride       PRN Meds: oxyCODONE-acetaminophen, oxyCODONE-acetaminophen, albuterol sulfate HFA, bisacodyl, HYDROmorphone, glucose, dextrose, glucagon (rDNA), dextrose, sodium chloride flush, sodium chloride, potassium chloride **OR** potassium alternative oral replacement **OR** potassium chloride, magnesium sulfate, promethazine **OR** ondansetron, polyethylene glycol, nicotine, acetaminophen **OR** acetaminophen, hydrOXYzine    Data:     Past Medical History:   has a past medical history of Allergic rhinitis, Cellulitis of right heel, Chronic refractory osteomyelitis of left foot (HCC), COPD (chronic obstructive pulmonary disease) [address] Diabetic neuropathy ([facility] [address] Dizziness, DM (diabetes mellitus) ([facility] [address] Es[facility] cancer ([facility] GERD (gastroesophageal reflux disease), History of colon polyps, History of RESISTANT STAPHYLOCOCCUS AUREUS MODERATE GROWTH    CULTURE PSEUDOMONAS AERUGINOSA MODERATE GROWTH (A) 04/03/2021 07:50 PM    CULTURE ENTEROBACTER CLOACAE LIGHT GROWTH (A) 04/03/2021 07:50 PM       Radiology:  Mri Tibia Fibula Left W Wo Contrast    Result Date: 4/5/2021  1. Status post left below-the-knee amputation with a soft tissue defect at the anterior distal aspect of the stump. Underlying soft tissue edema and area of irregular nonenhancing soft tissue extending to the margin of the tibial stump. Findings are compatible with cellulitis/myositis. No drainable fluid collection identified. 2. Significant marrow edema within the distal tibial stump concerning for osteomyelitis. Alternatively, postoperative edema is a possibility. 3. Mild marrow edema at the distal margin of the fibular stump likely postoperative. 4. Chronic healed fracture of the lateral tibial plateau. No convincing evidence of internal derangement.      Ir Insert Picc Vad W Sq Port >5 Years    Result Date: 4/8/2021  Successful ultrasound and fluoroscopy guided PICC placement       Physical Examination:        General appearance:  alert, cooperative and no distress  Mental Status:  oriented to person, place and time and normal affect  Lungs:  clear to auscultation bilaterally, normal effort  Heart:  regular rate and rhythm, no murmur  Abdomen:  soft, nontender, nondistended, normal bowel sounds, no masses, hepatomegaly, splenomegaly  Extremities:  nael bka  Skin:  No erythem around left bka wound site, just has pink tissue in healing phase; left stump with VAC on    Assessment:        Hospital Problems           Last Modified POA    * (Principal) Cellulitis of left lower extremity at BKA stump 4/8/2021 Yes    Type 2 diabetes mellitus with diabetic polyneuropathy, with long-term current use of insulin ([address] 4/3/2021 Yes    Diabetic polyneuropathy ([address] 4/3/2021 Yes    Tobacco dependence 4/3/2021 Yes    Edentulous 4/3/2021 Yes    COPD without exacerbation ([facility] [facility] 4/8/2021 Yes    HLD (hyperlipidemia) 4/3/2021 Yes    Gastroesophageal reflux disease 4/3/2021 Yes    Marijuana use 4/3/2021 Yes    History of esophageal cancer 4/3/2021 Yes    PAD (peripheral artery disease) ([address] 4/3/2021 Yes    Carotid stenosis, asymptomatic, bilateral (Chronic) 4/3/2021 Yes    Below-knee amputation of right lower extremity ([address] (Chronic) 4/3/2021 Yes    Moderate malnutrition ([address] (Chronic) 4/3/2021 Yes          Plan:        1. Cont cefepime and vanco as per ID: through 5/16  2.  Awaiting precert for snf; otherwise medically stable for discharge    Maggie Chiu DO  4/11/2021  12:32 PM

## 2021-04-11 NOTE — PLAN OF CARE
RN  Outcome: Ongoing     Problem: Serum Glucose Level - Abnormal:  Goal: Ability to maintain appropriate glucose levels will improve  Description: Ability to maintain appropriate glucose levels will improve  4/11/2021 1137 by Aspen Whaley RN  Outcome: Ongoing  4/11/2021 0514 by Brian Lynn RN  Outcome: Ongoing     Problem: Tobacco Use:  Goal: Inpatient tobacco use cessation counseling participation  Description: Inpatient tobacco use cessation counseling participation  4/11/2021 1137 by Aspen Whaley RN  Outcome: Ongoing  4/11/2021 0514 by Brian Lynn RN  Outcome: Ongoing     Problem: IP MOBILITY  Goal: LTG - patient will ambulate household distance  4/11/2021 1137 by Aspen Whaley RN  Outcome: Ongoing  4/11/2021 0514 by Brian Lynn RN  Outcome: Ongoing     Problem: IP BALANCE  Goal: LTG - Patient will maintain balance to allow for safe/functional mobility  4/11/2021 1137 by Aspen Whaley RN  Outcome: Ongoing  4/11/2021 0514 by Brian Lynn RN  Outcome: Ongoing     Problem: Infection - Surgical Site:  Goal: Will show no infection signs and symptoms  Description: Will show no infection signs and symptoms  4/11/2021 1137 by Aspen Whaley RN  Outcome: Ongoing  4/11/2021 0514 by Brian Lynn RN  Outcome: Ongoing

## 2021-04-11 NOTE — PROGRESS NOTES
Writer and dayshift RN down to room. Patient is upset and complaining the wound vac is not working. Narrator instructs the patient how the wound vac works and is unable to convince the patient. Patient made aware, narrator will return and discuss the  working wound vac later.

## 2021-04-11 NOTE — PLAN OF CARE
Problem: Falls - Risk of:  Goal: Will remain free from falls  Description: Will remain free from falls  Outcome: Ongoing  Goal: Absence of physical injury  Description: Absence of physical injury  Outcome: Ongoing     Problem: Pain:  Goal: Pain level will decrease  Description: Pain level will decrease  Outcome: Ongoing  Goal: Control of acute pain  Description: Control of acute pain  Outcome: Ongoing  Goal: Control of chronic pain  Description: Control of chronic pain  Outcome: Ongoing     Problem: Skin Integrity:  Goal: Will show no infection signs and symptoms  Description: Will show no infection signs and symptoms  Outcome: Ongoing  Goal: Absence of new skin breakdown  Description: Absence of new skin breakdown  Outcome: Ongoing     Problem: Nutrition  Goal: Optimal nutrition therapy  Outcome: Ongoing     Problem:  Body Temperature - Imbalanced:  Goal: Ability to maintain a body temperature in the normal range will improve  Description: Ability to maintain a body temperature in the normal range will improve  Outcome: Ongoing     Problem: Serum Glucose Level - Abnormal:  Goal: Ability to maintain appropriate glucose levels will improve  Description: Ability to maintain appropriate glucose levels will improve  Outcome: Ongoing     Problem: Tobacco Use:  Goal: Inpatient tobacco use cessation counseling participation  Description: Inpatient tobacco use cessation counseling participation  Outcome: Ongoing     Problem: IP MOBILITY  Goal: LTG - patient will ambulate household distance  Outcome: Ongoing     Problem: IP BALANCE  Goal: LTG - Patient will maintain balance to allow for safe/functional mobility  Outcome: Ongoing     Problem: Infection - Surgical Site:  Goal: Will show no infection signs and symptoms  Description: Will show no infection signs and symptoms  Outcome: Ongoing

## 2021-04-11 NOTE — PROGRESS NOTES
Patient seen for his self the wound vac is working, he seen the bubbles from vac dressing travel to the canister. Patient is at ease.

## 2021-04-11 NOTE — PROGRESS NOTES
Day of Therapy:9  Current Dose:vancomycin 1000mg ivpb q12h . Repeat trough in am.   Culture Results- no new results            Blood:           Sputum:           Wound:           Urine: Other:  Temp Readings from Last 3 Encounters:   04/11/21 97.5 °F (36.4 °C) (Oral)   04/06/21 97.9 °F (36.6 °C)   03/29/21 98.3 °F (36.8 °C) (Oral)     No results for input(s): WBC in the last 72 hours. Recent Labs     04/10/21  0629   CREATININE 1.00     Estimated Creatinine Clearance: 71 mL/min (based on SCr of 1 mg/dL).     Intake/Output Summary (Last 24 hours) at 4/11/2021 1048  Last data filed at 4/11/2021 0757  Gross per 24 hour   Intake --   Output 600 ml   Net -600 ml

## 2021-04-12 ENCOUNTER — HOSPITAL ENCOUNTER (EMERGENCY)
Age: 66
Discharge: NURSING, REHAB OR LONG-TERM CARE | End: 2021-04-13
Attending: EMERGENCY MEDICINE
Payer: MEDICARE

## 2021-04-12 VITALS
DIASTOLIC BLOOD PRESSURE: 63 MMHG | RESPIRATION RATE: 16 BRPM | SYSTOLIC BLOOD PRESSURE: 143 MMHG | TEMPERATURE: 97.3 F | WEIGHT: 153.1 LBS | HEIGHT: 72 IN | BODY MASS INDEX: 20.74 KG/M2 | HEART RATE: 94 BPM | OXYGEN SATURATION: 96 %

## 2021-04-12 DIAGNOSIS — Z51.89 ENCOUNTER FOR WOUND RE-CHECK: Primary | ICD-10-CM

## 2021-04-12 LAB
BUN BLDV-MCNC: 25 MG/DL (ref 8–23)
CREAT SERPL-MCNC: 1 MG/DL (ref 0.7–1.2)
GFR AFRICAN AMERICAN: >60 ML/MIN
GFR NON-AFRICAN AMERICAN: >60 ML/MIN
GFR SERPL CREATININE-BSD FRML MDRD: ABNORMAL ML/MIN/{1.73_M2}
GFR SERPL CREATININE-BSD FRML MDRD: ABNORMAL ML/MIN/{1.73_M2}
GLUCOSE BLD-MCNC: 271 MG/DL (ref 75–110)
GLUCOSE BLD-MCNC: 327 MG/DL (ref 75–110)
VANCOMYCIN TROUGH DATE LAST DOSE: ABNORMAL
VANCOMYCIN TROUGH DOSE AMOUNT: ABNORMAL
VANCOMYCIN TROUGH TIME LAST DOSE: ABNORMAL
VANCOMYCIN TROUGH: 31.2 UG/ML (ref 10–20)

## 2021-04-12 PROCEDURE — 80202 ASSAY OF VANCOMYCIN: CPT

## 2021-04-12 PROCEDURE — 2580000003 HC RX 258: Performed by: SURGERY

## 2021-04-12 PROCEDURE — 96375 TX/PRO/DX INJ NEW DRUG ADDON: CPT

## 2021-04-12 PROCEDURE — 99232 SBSQ HOSP IP/OBS MODERATE 35: CPT | Performed by: NURSE PRACTITIONER

## 2021-04-12 PROCEDURE — 97530 THERAPEUTIC ACTIVITIES: CPT

## 2021-04-12 PROCEDURE — 82565 ASSAY OF CREATININE: CPT

## 2021-04-12 PROCEDURE — 36415 COLL VENOUS BLD VENIPUNCTURE: CPT

## 2021-04-12 PROCEDURE — 6370000000 HC RX 637 (ALT 250 FOR IP): Performed by: INTERNAL MEDICINE

## 2021-04-12 PROCEDURE — 99285 EMERGENCY DEPT VISIT HI MDM: CPT

## 2021-04-12 PROCEDURE — 97535 SELF CARE MNGMENT TRAINING: CPT

## 2021-04-12 PROCEDURE — 6370000000 HC RX 637 (ALT 250 FOR IP): Performed by: SURGERY

## 2021-04-12 PROCEDURE — 6360000002 HC RX W HCPCS: Performed by: INTERNAL MEDICINE

## 2021-04-12 PROCEDURE — 6360000002 HC RX W HCPCS: Performed by: SURGERY

## 2021-04-12 PROCEDURE — APPNB30 APP NON BILLABLE TIME 0-30 MINS: Performed by: NURSE PRACTITIONER

## 2021-04-12 PROCEDURE — 84520 ASSAY OF UREA NITROGEN: CPT

## 2021-04-12 PROCEDURE — 82947 ASSAY GLUCOSE BLOOD QUANT: CPT

## 2021-04-12 PROCEDURE — 96374 THER/PROPH/DIAG INJ IV PUSH: CPT

## 2021-04-12 RX ADMIN — FAMOTIDINE 20 MG: 20 TABLET ORAL at 09:01

## 2021-04-12 RX ADMIN — PROBIOTIC PRODUCT - TAB 1 TABLET: TAB at 09:01

## 2021-04-12 RX ADMIN — SODIUM CHLORIDE, PRESERVATIVE FREE 10 ML: 5 INJECTION INTRAVENOUS at 09:01

## 2021-04-12 RX ADMIN — INSULIN LISPRO 3 UNITS: 100 INJECTION, SOLUTION INTRAVENOUS; SUBCUTANEOUS at 12:21

## 2021-04-12 RX ADMIN — DOCUSATE SODIUM 50MG AND SENNOSIDES 8.6MG 2 TABLET: 8.6; 5 TABLET, FILM COATED ORAL at 09:01

## 2021-04-12 RX ADMIN — HYDROMORPHONE HYDROCHLORIDE 0.5 MG: 1 INJECTION, SOLUTION INTRAMUSCULAR; INTRAVENOUS; SUBCUTANEOUS at 14:07

## 2021-04-12 RX ADMIN — INSULIN LISPRO 4 UNITS: 100 INJECTION, SOLUTION INTRAVENOUS; SUBCUTANEOUS at 08:58

## 2021-04-12 RX ADMIN — OXYCODONE AND ACETAMINOPHEN 2 TABLET: 5; 325 TABLET ORAL at 05:47

## 2021-04-12 RX ADMIN — TAMSULOSIN HYDROCHLORIDE 0.4 MG: 0.4 CAPSULE ORAL at 09:01

## 2021-04-12 RX ADMIN — APIXABAN 5 MG: 5 TABLET, FILM COATED ORAL at 09:01

## 2021-04-12 RX ADMIN — VANCOMYCIN HYDROCHLORIDE 1000 MG: 1 INJECTION, POWDER, LYOPHILIZED, FOR SOLUTION INTRAVENOUS at 00:16

## 2021-04-12 RX ADMIN — CEFEPIME HYDROCHLORIDE 2000 MG: 2 INJECTION, POWDER, FOR SOLUTION INTRAVENOUS at 05:46

## 2021-04-12 RX ADMIN — OXYCODONE AND ACETAMINOPHEN 2 TABLET: 5; 325 TABLET ORAL at 11:11

## 2021-04-12 ASSESSMENT — ENCOUNTER SYMPTOMS
DIARRHEA: 0
CONSTIPATION: 1
SHORTNESS OF BREATH: 0
WHEEZING: 0
SINUS PAIN: 0
ABDOMINAL DISTENTION: 0
SINUS PRESSURE: 0
PHOTOPHOBIA: 0
NAUSEA: 0

## 2021-04-12 ASSESSMENT — PAIN DESCRIPTION - ONSET
ONSET: ON-GOING
ONSET: ON-GOING

## 2021-04-12 ASSESSMENT — PAIN SCALES - GENERAL
PAINLEVEL_OUTOF10: 8
PAINLEVEL_OUTOF10: 8

## 2021-04-12 ASSESSMENT — PAIN DESCRIPTION - LOCATION: LOCATION: LEG

## 2021-04-12 ASSESSMENT — PAIN - FUNCTIONAL ASSESSMENT: PAIN_FUNCTIONAL_ASSESSMENT: PREVENTS OR INTERFERES WITH ALL ACTIVE AND SOME PASSIVE ACTIVITIES

## 2021-04-12 ASSESSMENT — PAIN DESCRIPTION - FREQUENCY: FREQUENCY: CONTINUOUS

## 2021-04-12 ASSESSMENT — PAIN DESCRIPTION - PAIN TYPE: TYPE: ACUTE PAIN

## 2021-04-12 ASSESSMENT — PAIN DESCRIPTION - ORIENTATION: ORIENTATION: LEFT

## 2021-04-12 NOTE — PROGRESS NOTES
The patient is discharged at this and transferred to [facility] per [facility]. The writer spoke with Link Allen at facility; facility nurse was unable to take report at this time.
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
## 2021-04-12 NOTE — PROGRESS NOTES
Pharmacy Note  Vancomycin Consult - Follow Up     Vancomycin Therapy Day: 10  Current Dosin q12h  Current diagnosis for which MRSA is suspected/confirmed: osteomyelitis  ONLY for suspected pneumonia or COPD: MRSA nasal swab   N/A: Non respiratory infection. .      Temp: remains afebrile    Actual Weight:   Wt Readings from Last 1 Encounters:   21 153 lb 1.6 oz (69.4 kg)       Recent Labs     04/10/21  0629 21  0626   CREATININE 1.00 1.00     Calculate CrCl based on IBW- 74  (patient is a below the knee amputee, calculations vary)    No results for input(s): WBC in the last 72 hours. Intake/Output Summary (Last 24 hours) at 2021 0835  Last data filed at 2021 0549  Gross per 24 hour   Intake 250 ml   Output 1415 ml   Net -1165 ml           ASSESSMENT/PLAN    Vancomycin level was drawn too early so the \"trough\" of 31.2 is not to be viewed as such. Inputting information into bayesian software to calculate optimal AUC dosing (goal 400-600) with minimizing toxicity suggests 1000mg q24 hours is optimal regimen. Placed orders for Vancomycin 1000 mg q24 to start tonight at 21:00. Ordered a vancomycin level for 21 @ 20:00. Thank you for the consult. Will Continue to follow.   Madelon Gottron, KileyD  2021  8:35 AM

## 2021-04-12 NOTE — CARE COORDINATION
[facility] received precert. Orders faxed. Nurse to call report to [phone]. Discussed with patient. He is agreeable with dc plans.  Steven Armenta

## 2021-04-12 NOTE — PLAN OF CARE
Problem: Serum Glucose Level - Abnormal:  Goal: Ability to maintain appropriate glucose levels will improve  Description: Ability to maintain appropriate glucose levels will improve  4/12/2021 1429 by Catherine Haile RN  Outcome: Ongoing  4/12/2021 0102 by Pasty Hodgkin, RN  Outcome: Ongoing     Problem: Tobacco Use:  Goal: Inpatient tobacco use cessation counseling participation  Description: Inpatient tobacco use cessation counseling participation  4/12/2021 1429 by Catherine Haile RN  Outcome: Ongoing  4/12/2021 0102 by Pasty Hodgkin, RN  Outcome: Ongoing     Problem: IP MOBILITY  Goal: LTG - patient will ambulate household distance  4/12/2021 1429 by Catherine Haile RN  Outcome: Ongoing  4/12/2021 0102 by Pasty Hodgkin, RN  Outcome: Ongoing     Problem: IP BALANCE  Goal: LTG - Patient will maintain balance to allow for safe/functional mobility  4/12/2021 1429 by Catherine Haile RN  Outcome: Ongoing  4/12/2021 0102 by Pasty Hodgkin, RN  Outcome: Ongoing     Problem: Infection - Surgical Site:  Goal: Will show no infection signs and symptoms  Description: Will show no infection signs and symptoms  4/12/2021 1429 by Catherine Haile RN  Outcome: Ongoing  4/12/2021 0102 by Pasty Hodgkin, RN  Outcome: Ongoing     Problem: Falls - Risk of:  Goal: Will remain free from falls  Description: Will remain free from falls  4/12/2021 1429 by Catherine Haile RN  Outcome: Ongoing  4/12/2021 0102 by Pasty Hodgkin, RN  Outcome: Ongoing  Goal: Absence of physical injury  Description: Absence of physical injury  4/12/2021 1429 by Catherine Haile RN  Outcome: Ongoing  4/12/2021 0102 by Pasty Hodgkin, RN  Outcome: Ongoing     Problem: Pain:  Goal: Pain level will decrease  Description: Pain level will decrease  4/12/2021 1429 by Catherine Haile RN  Outcome: Ongoing  4/12/2021 0102 by Pasty Hodgkin, RN  Outcome: Ongoing  Goal: Control of acute pain  Description: Control of acute pain  4/12/2021 1429 by Catherine Haile RN  Outcome: Ongoing  4/12/2021 0102 by Gerry Mackey safe/functional mobility  4/12/2021 1429 by Felipa De RN  Outcome: Ongoing  4/12/2021 0102 by Angela Aviles RN  Outcome: Ongoing     Problem: Infection - Surgical Site:  Goal: Will show no infection signs and symptoms  Description: Will show no infection signs and symptoms  4/12/2021 1429 by Felipa De RN  Outcome: Ongoing  4/12/2021 0102 by Angela Aviles RN  Outcome: Ongoing
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
## 2021-04-12 NOTE — PROGRESS NOTES
[facility]   Urology Progress Note            Subjective: Follow-up urinary retention, enlarged prostate, monitoring postvoid residual    Patient Vitals for the past 24 hrs:   BP Temp Temp src Pulse Resp SpO2 Weight   04/12/21 0630 -- -- -- -- -- -- 153 lb 1.6 oz (69.4 kg)   04/11/21 1902 (!) 146/68 97.7 °F (36.5 °C) Oral 97 17 95 % --   04/11/21 0746 (!) 137/56 97.5 °F (36.4 °C) Oral 93 17 98 % --       Intake/Output Summary (Last 24 hours) at 4/12/2021 4268  Last data filed at 4/12/2021 0549  Gross per 24 hour   Intake 250 ml   Output 1715 ml   Net -1465 ml       No results for input(s): WBC, HGB, HCT, MCV, PLT in the last 72 hours. Recent Labs     04/10/21  0629   BUN 15   CREATININE 1.00       No results for input(s): COLORU, PHUR, LABCAST, WBCUA, RBCUA, MUCUS, TRICHOMONAS, YEAST, BACTERIA, CLARITYU, SPECGRAV, LEUKOCYTESUR, UROBILINOGEN, BILIRUBINUR, BLOODU in the last 72 hours.     Invalid input(s): NITRATE, GLUCOSEUKETONESUAMORPHOUS    Additional Lab/culture results:    Physical Exam: Patient alert oriented he is not in acute distress patient has refused the catheter and has been able to void in between spontaneously  Discussed discharge planning with the patient as well as outpatient follow-up    Interval Imaging Findings:    Impression:    Patient Active Problem List   Diagnosis    Type 2 diabetes mellitus with diabetic polyneuropathy, with long-term current use of insulin ([address]    Neuropathic pain, leg    Diabetic polyneuropathy ([address]    Allergic rhinitis    Tobacco dependence    Edentulous    Dysphagia    Lung nodules    Esophageal cancer ([address]    Fracture of humerus, left, closed    Closed fracture of humerus    DM type 2 with diabetic peripheral neuropathy ([address]    COPD without exacerbation ([address]    HLD (hyperlipidemia)    Gastroesophageal reflux disease    Chronic pain syndrome    Marijuana use    History of colon polyps    Functional diarrhea    Sepsis due to methicillin resistant Staphylococcus aureus (MRSA) ([address]    History of esophageal cancer    Osteomyelitis, chronic, ankle or foot ([address]    PAD (peripheral artery disease) (HCC)    Other pulmonary embolism without acute cor pulmonale (HCC)    Carotid stenosis, asymptomatic, bilateral    Lower limb amputation status    Fx humeral neck, right, closed, initial encounter    Transient hypotension    Constipation due to opioid therapy    Acute kidney injury ([address]    Diabetic ulcer of left midfoot associated with type 2 diabetes mellitus, with fat layer exposed ([address]    Complication of below knee amputation stump (HCC)    COPD exacerbation (HCC)    Hyponatremia    Pain, phantom limb ([address]    Below-knee amputation of right lower extremity ([address]    Moderate protein malnutrition ([address]    Essential hypertension    Uncontrolled type 2 diabetes mellitus with hyperglycemia (HCC)    History of pulmonary embolism - 2017    Atelectasis    Uncontrolled type 2 diabetes mellitus with foot ulcer ([facility]    Azotemia    Anemia, normocytic normochromic    Drug-induced constipation    Osteomyelitis of metatarsals of left foot ([address]    Diabetic foot infection ([address]    Noncompliance    Type 1 diabetes mellitus with diabetic foot infection (HCC)    Acute osteomyelitis of left foot (HCC)    Cellulitis of left foot    Mild malnutrition ([address]    Diabetic infection of left foot (HCC)    Hypocalcemia    Hypokalemia    Moderate malnutrition (HCC)    Diabetic ulcer of left midfoot associated with type 2 diabetes mellitus, with necrosis of bone (HCC)    History of below knee amputation, right ([address]    Foot ulcer with fat layer exposed, left ([address]    Chronic refractory osteomyelitis of left foot ([address]    Sepsis ([address]    Pyogenic inflammation of bone ([address]    Cellulitis of left lower extremity at BKA stump       Plan: Office visit in 2 to 3 weeks, we will monitor postvoid residual and voiding symptoms    Martina Barlow  6:38 AM 4/12/2021

## 2021-04-12 NOTE — DISCHARGE SUMMARY
[facility]  Office: [address], DO, Alyson Reis, DO, Magda Mili, DO, Hanoliva Chiu, DO, Gloria Woods MD, Mitzi Castrejon MD, Ashli Olson MD, Yael Ramsay MD, Son Noland MD, Frances Kee MD, Jalil Alejo MD, Shara Morgan MD, Abimael Perez DO, Evi Fox MD, Pancho De La Rosa DO, Devante Barclay MD,  Galilae Parks DO, Jitendra Pedraza MD, Lory Leyva MD, Consuelo Owens MD, Paty Rodrigez MD, Sharita Javed, [facility], [facility], [facility], Mare Medrano, CNP, Estefania Govea, [facility], Nehemias Montalvo, CNP, Bethel Hackett, CNP, Kiara Castellon, CNP, Krystal Angela, CNP, Rina Hewitt, CNP, Julio Castillo PA-C, Alejandro Cabral, ANIYAH, Diana Calix, CNP, Nacho Bland, CNP, Adrian Monzon, CNP, Marina Perez, CNP, Diane Walsh, CNP, Angela Mahajan, [facility]    Discharge Summary     Patient ID: Charley Salinas  :  1955   MRN: [id number]     ACCOUNT:  [de-identified]   Patient's PCP: Rafal Chicas DO  Admit Date: 4/3/2021   Discharge Date: 2021     Length of Stay: 9  Code Status:  Full Code  Admitting Physician: Frances Kee MD  Discharge Physician: MARCELO Castillo - RICHARD     Active Discharge Diagnoses:     Hospital Problem Lists:  Principal Problem:    Cellulitis of left lower extremity at BKA stump  Active Problems:    Type 2 diabetes mellitus with diabetic polyneuropathy, with long-term current use of insulin (HCC)    Diabetic polyneuropathy [facility]    Tobacco dependence    Edentulous    COPD without exacerbation ([facility]    HLD (hyperlipidemia)    Gastroesophageal reflux disease    Marijuana use    History of esophageal cancer    PAD (peripheral artery disease) ([facility]    Carotid stenosis, asymptomatic, bilateral    Below-knee amputation of right lower extremity (HCC)    Moderate malnutrition ([facility]  Resolved Problems:    * No resolved hospital problems.  *      Admission Condition: fair     Discharged Condition: stable    Hospital Stay:     Hospital Course:  Gonzalo Mcclellan is a 63 y.o. male who was admitted for the management of  Cellulitis of left lower extremity , presented to ER with Leg Pain    4/3 -admitted through the emergency department with left lower leg pain and redness. History of BKA bilaterally. Patient had left BKA completed on 2/9/2021 at this facility. Patient has developed a postoperative wound and had home care for wound care.     During his hospital course vascular surgery and infectious disease were consulted. Wound culture was completed. Infectious disease directs antimicrobial therapy and wound care was involved as well. Wound VAC was placed. Patient's condition steadily improved during hospital course. Patient reports noncompliance as an outpatient with medical therapies and reports that he is having difficulty caring for himself in the home.  and discharge planning are involved. Patient will benefit from SNF placement in the short-term. 6/24 - SNF pre-certification obtained and PICC IV antibiotics are arranged as an out patient. Pt to be discharged this afternoon. Significant therapeutic interventions: Debridement with VAC placement. Infectious disease consultation and broad-spectrum IV antibiotics.     Significant Diagnostic Studies:   Labs / Micro:  CBC:   Lab Results   Component Value Date    WBC 9.5 04/07/2021    RBC 3.32 04/07/2021    RBC 4.32 05/02/2012    HGB 8.0 04/07/2021    HCT 27.6 04/07/2021    MCV 83.1 04/07/2021    MCH 24.1 04/07/2021    MCHC 29.0 04/07/2021    RDW 15.6 04/07/2021     04/07/2021     05/02/2012     BMP:    Lab Results   Component Value Date    GLUCOSE 208 04/07/2021    GLUCOSE 129 05/02/2012     04/07/2021    K 3.3 04/07/2021     04/07/2021    CO2 25 04/07/2021    ANIONGAP 12 04/07/2021    BUN 25 04/12/2021    CREATININE 1.00 04/12/2021    BUNCRER 14 04/07/2021    CALCIUM 8.2 04/07/2021    LABGLOM >60 04/12/2021    GFRAA >60 04/12/2021    GFR      04/12/2021    GFR NOT REPORTED 04/12/2021     U/A:    Lab Results   Component Value Date    COLORU YELLOW 03/05/2021    TURBIDITY CLEAR 03/05/2021    SPECGRAV 1.020 03/05/2021    HGBUR TRACE 03/05/2021    PHUR 6.0 03/05/2021    PROTEINU 2+ 03/05/2021    GLUCOSEU 3+ 03/05/2021    GLUCOSEU TRACE 03/05/2012    KETUA NEGATIVE 03/05/2021    BILIRUBINUR NEGATIVE 03/05/2021    BILIRUBINUR small 07/18/2016    BILIRUBINUR NEGATIVE 03/05/2012    UROBILINOGEN Normal 03/05/2021    NITRU NEGATIVE 03/05/2021    LEUKOCYTESUR NEGATIVE 03/05/2021        Radiology:  Mri Tibia Fibula Left W Wo Contrast    Result Date: 4/5/2021  1. Status post left below-the-knee amputation with a soft tissue defect at the anterior distal aspect of the stump. Underlying soft tissue edema and area of irregular nonenhancing soft tissue extending to the margin of the tibial stump. Findings are compatible with cellulitis/myositis. No drainable fluid collection identified. 2. Significant marrow edema within the distal tibial stump concerning for osteomyelitis. Alternatively, postoperative edema is a possibility. 3. Mild marrow edema at the distal margin of the fibular stump likely postoperative. 4. Chronic healed fracture of the lateral tibial plateau. No convincing evidence of internal derangement. Ir Insert Picc Vad W Sq Port >5 Years    Result Date: 4/8/2021  Successful ultrasound and fluoroscopy guided PICC placement       Consultations:    Consults:     Final Specialist Recommendations/Findings:   IP CONSULT TO VASCULAR SURGERY  IP CONSULT TO HOSPITALIST  PHARMACY TO DOSE VANCOMYCIN  IP CONSULT TO INFECTIOUS DISEASES  IP CONSULT TO DIETITIAN  IP CONSULT TO UROLOGY      The patient was seen and examined on day of discharge and this discharge summary is in conjunction with any daily progress note from day of discharge.     Discharge plan:     Disposition: SNF    Physician Follow Up:     [address] [facility] [address]  [facility]  [phone]  On 4/21/2021  Appointment 4-21-21 at 1:30pm    Colonel Pawel MD  [id number] [place] [address]  [phone]    Go on 4/27/2021  Appointment 4-27-21 at 9:45am       Requiring Further Evaluation/Follow Up POST HOSPITALIZATION/Incidental Findings: Failure to thrive in the home    Diet: diabetic diet    Activity: As tolerated    Instructions to Patient: Take the antibiotics via IV as prescribed. Engage with wound care. Engage with physical therapy for continued rehab. Discharge Medications:      Medication List      START taking these medications    bisacodyl 5 MG EC tablet  Commonly known as: DULCOLAX  Take 1 tablet by mouth daily as needed for Constipation     cefepime  infusion  Commonly known as: MAXIPIME  Infuse 2,000 mg intravenously every 12 hours Compound per protocol     glucose 40 % Gel  Commonly known as: GLUTOSE  Take 37.5 mLs by mouth as needed (hypoglycemia)     polyethylene glycol 17 g packet  Commonly known as: GLYCOLAX  Take 17 g by mouth daily as needed for Constipation     sennosides-docusate sodium 8.6-50 MG tablet  Commonly known as: SENOKOT-S  Take 2 tablets by mouth 2 times daily     tamsulosin 0.4 MG capsule  Commonly known as: FLOMAX  Take 1 capsule by mouth daily     vancomycin  infusion  Commonly known as: VANCOCIN  Infuse 1,000 mg intravenously every 12 hours Compound per protocol. CHANGE how you take these medications    insulin glargine 100 UNIT/ML injection vial  Commonly known as: LANTUS  Inject 25 Units into the skin Daily with supper  What changed: Another medication with the same name was removed. Continue taking this medication, and follow the directions you see here.      * insulin lispro 100 UNIT/ML injection vial  Commonly known as: HUMALOG  Inject 0-6 Units into the skin 3 times daily (with meals)  What changed: how much to take     * insulin lispro 100 UNIT/ML injection vial  Commonly known as: HUMALOG  Inject 0-3 Units into the skin nightly  What changed: how much to take     oxyCODONE-acetaminophen 5-325 MG per tablet  Commonly known as: PERCOCET  Take 1 tablet by mouth every 4 hours as needed for Pain for up to 3 days. What changed:   · See the new instructions. · Another medication with the same name was removed. Continue taking this medication, and follow the directions you see here. * This list has 2 medication(s) that are the same as other medications prescribed for you. Read the directions carefully, and ask your doctor or other care provider to review them with you.             CONTINUE taking these medications    amitriptyline 75 MG tablet  Commonly known as: ELAVIL  Take 1 tablet by mouth nightly     apixaban 5 MG Tabs tablet  Commonly known as: Eliquis  Take 1 tablet by mouth 2 times daily     blood glucose test strips strip  Commonly known as: [employer] AUTODISC VI;ONE TOUCH ULTRA TEST VI  Use with associated glucose meter to check fluctuating blood sugars BID     budesonide-formoterol 160-4.5 MCG/ACT Aero  Commonly known as: SYMBICORT  Inhale 2 puffs into the lungs 2 times daily     famotidine 20 MG tablet  Commonly known as: PEPCID     glucose monitoring kit monitoring kit  1 kit by Does not apply route daily     hydrOXYzine 25 MG capsule  Commonly known as: VISTARIL     ipratropium-albuterol 0.5-2.5 (3) MG/3ML Soln nebulizer solution  Commonly known as: DUONEB  Inhale 3 mLs into the lungs every 4 hours as needed for Shortness of Breath     lactobacillus Tabs  Take 1 tablet by mouth 2 times daily     Lancets Misc  1 each by Does not apply route 3 times daily     metFORMIN 500 MG tablet  Commonly known as: GLUCOPHAGE  Take 1 tablet by mouth 2 times daily (with meals)     Ventolin  (90 Base) MCG/ACT inhaler  Generic drug: albuterol sulfate HFA        STOP taking these medications    doxepin 100 MG capsule  Commonly known as: SINEQUAN ondansetron 4 MG disintegrating tablet  Commonly known as: Zofran ODT           Where to Get Your Medications      You can get these medications from any pharmacy    Bring a paper prescription for each of these medications  · oxyCODONE-acetaminophen 5-325 MG per tablet     Information about where to get these medications is not yet available    Ask your nurse or doctor about these medications  · bisacodyl 5 MG EC tablet  · cefepime  infusion  · glucose 40 % Gel  · insulin lispro 100 UNIT/ML injection vial  · insulin lispro 100 UNIT/ML injection vial  · polyethylene glycol 17 g packet  · sennosides-docusate sodium 8.6-50 MG tablet  · tamsulosin 0.4 MG capsule  · vancomycin  infusion         No discharge procedures on file. Time Spent on discharge is  39 mins in patient examination, evaluation, counseling as well as medication reconciliation, prescriptions for required medications, discharge plan and follow up. Electronically signed by   MARCELO Vale NP  4/12/2021  1:18 PM      Thank you Dr. Lew Briseno DO for the opportunity to be involved in this patient's care.
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
## 2021-04-12 NOTE — PLAN OF CARE
Problem: Falls - Risk of:  Goal: Will remain free from falls  Description: Will remain free from falls  4/12/2021 0102 by Shalom Adams RN  Outcome: Ongoing     Problem: Pain:  Goal: Pain level will decrease  Description: Pain level will decrease  4/12/2021 0102 by Shalom Adams RN  Outcome: Ongoing     Problem: Skin Integrity:  Goal: Will show no infection signs and symptoms  Description: Will show no infection signs and symptoms  4/12/2021 0102 by Shalom Adams RN  Outcome: Ongoing     Problem: IP MOBILITY  Goal: LTG - patient will ambulate household distance  4/12/2021 0102 by Shalom Adams RN  Outcome: Ongoing     Problem: Infection - Surgical Site:  Goal: Will show no infection signs and symptoms  Description: Will show no infection signs and symptoms  4/12/2021 0102 by Shalom Adams RN  Outcome: Ongoing     Problem: Pain:  Goal: Control of acute pain  Description: Control of acute pain  4/12/2021 0102 by Shalom Adams RN  Outcome: Ongoing

## 2021-04-13 ENCOUNTER — HOSPITAL ENCOUNTER (OUTPATIENT)
Dept: WOUND CARE | Age: 66
Discharge: HOME | End: 2021-04-13

## 2021-04-13 VITALS
WEIGHT: 153.1 LBS | TEMPERATURE: 98.2 F | HEIGHT: 72 IN | BODY MASS INDEX: 20.74 KG/M2 | DIASTOLIC BLOOD PRESSURE: 70 MMHG | RESPIRATION RATE: 16 BRPM | HEART RATE: 96 BPM | SYSTOLIC BLOOD PRESSURE: 104 MMHG | OXYGEN SATURATION: 94 %

## 2021-04-13 LAB — GLUCOSE BLD-MCNC: 312 MG/DL (ref 75–110)

## 2021-04-13 PROCEDURE — 6360000002 HC RX W HCPCS: Performed by: EMERGENCY MEDICINE

## 2021-04-13 PROCEDURE — 96375 TX/PRO/DX INJ NEW DRUG ADDON: CPT

## 2021-04-13 PROCEDURE — 96374 THER/PROPH/DIAG INJ IV PUSH: CPT

## 2021-04-13 PROCEDURE — 6370000000 HC RX 637 (ALT 250 FOR IP): Performed by: EMERGENCY MEDICINE

## 2021-04-13 PROCEDURE — 82947 ASSAY GLUCOSE BLOOD QUANT: CPT

## 2021-04-13 PROCEDURE — 2500000003 HC RX 250 WO HCPCS: Performed by: EMERGENCY MEDICINE

## 2021-04-13 RX ORDER — OXYCODONE HYDROCHLORIDE AND ACETAMINOPHEN 5; 325 MG/1; MG/1
1 TABLET ORAL ONCE
Status: COMPLETED | OUTPATIENT
Start: 2021-04-13 | End: 2021-04-13

## 2021-04-13 RX ORDER — LORAZEPAM 2 MG/ML
1 INJECTION INTRAMUSCULAR ONCE
Status: COMPLETED | OUTPATIENT
Start: 2021-04-13 | End: 2021-04-13

## 2021-04-13 RX ORDER — KETOROLAC TROMETHAMINE 15 MG/ML
15 INJECTION, SOLUTION INTRAMUSCULAR; INTRAVENOUS ONCE
Status: DISCONTINUED | OUTPATIENT
Start: 2021-04-13 | End: 2021-04-13

## 2021-04-13 RX ADMIN — OXYCODONE HYDROCHLORIDE AND ACETAMINOPHEN 1 TABLET: 5; 325 TABLET ORAL at 00:35

## 2021-04-13 RX ADMIN — LORAZEPAM 1 MG: 2 INJECTION, SOLUTION INTRAMUSCULAR; INTRAVENOUS at 00:43

## 2021-04-13 RX ADMIN — FAMOTIDINE 20 MG: 10 INJECTION, SOLUTION INTRAVENOUS at 00:54

## 2021-04-13 RX ADMIN — OXYCODONE HYDROCHLORIDE AND ACETAMINOPHEN 1 TABLET: 5; 325 TABLET ORAL at 00:42

## 2021-04-13 ASSESSMENT — PAIN SCALES - GENERAL: PAINLEVEL_OUTOF10: 9
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
## 2021-04-13 NOTE — ED NOTES
RN called to speak with Joceline Au at facility. Updated on medications given in ED and wound care done. Patient appears to be resting more comfortably.  ETA of transportation 1:30am.      Sachi Shelton RN  04/13/21 0109

## 2021-04-13 NOTE — ED NOTES
RN called to speak with RN at facility. States that patient arrived to facility around 3pm and immediately wanted to leave.  and manager of facility spoke to patient about staying and patient agreed. Patient appeared to be anxious at facility and reportedly called 9-1-1 himself. RN states that patients medications are available and room is still available should patient be discharged.       Dollene Cogan, RN  04/13/21 0907

## 2021-04-13 NOTE — ED NOTES
Bed: 12  Expected date:   Expected time:   Means of arrival:   Comments:  Jacqueline Terry  04/12/21 [id number]

## 2021-04-21 ENCOUNTER — HOSPITAL ENCOUNTER (OUTPATIENT)
Dept: WOUND CARE | Age: 66
Discharge: HOME | End: 2021-04-21
Payer: MEDICARE

## 2021-04-21 VITALS
WEIGHT: 153 LBS | RESPIRATION RATE: 18 BRPM | BODY MASS INDEX: 20.72 KG/M2 | TEMPERATURE: 97.7 F | SYSTOLIC BLOOD PRESSURE: 129 MMHG | DIASTOLIC BLOOD PRESSURE: 68 MMHG | HEART RATE: 87 BPM | HEIGHT: 72 IN

## 2021-04-21 DIAGNOSIS — E11.42 DM TYPE 2 WITH DIABETIC PERIPHERAL NEUROPATHY (HCC): ICD-10-CM

## 2021-04-21 DIAGNOSIS — F17.200 TOBACCO DEPENDENCE: ICD-10-CM

## 2021-04-21 DIAGNOSIS — E44.0 MODERATE MALNUTRITION (HCC): Chronic | ICD-10-CM

## 2021-04-21 DIAGNOSIS — E11.42 TYPE 2 DIABETES MELLITUS WITH DIABETIC POLYNEUROPATHY, WITH LONG-TERM CURRENT USE OF INSULIN (HCC): ICD-10-CM

## 2021-04-21 DIAGNOSIS — I73.9 PAD (PERIPHERAL ARTERY DISEASE) (HCC): ICD-10-CM

## 2021-04-21 DIAGNOSIS — E44.0 MODERATE PROTEIN MALNUTRITION (HCC): ICD-10-CM

## 2021-04-21 DIAGNOSIS — Z79.4 TYPE 2 DIABETES MELLITUS WITH DIABETIC POLYNEUROPATHY, WITH LONG-TERM CURRENT USE OF INSULIN (HCC): ICD-10-CM

## 2021-04-21 DIAGNOSIS — Z89.511 HISTORY OF BELOW KNEE AMPUTATION, RIGHT (HCC): ICD-10-CM

## 2021-04-21 DIAGNOSIS — L97.922 LEG ULCER, LEFT, WITH FAT LAYER EXPOSED (HCC): Primary | ICD-10-CM

## 2021-04-21 DIAGNOSIS — Z89.512 HISTORY OF BELOW KNEE AMPUTATION, LEFT (HCC): ICD-10-CM

## 2021-04-21 PROBLEM — L97.422 DIABETIC ULCER OF LEFT MIDFOOT ASSOCIATED WITH TYPE 2 DIABETES MELLITUS, WITH FAT LAYER EXPOSED (HCC): Chronic | Status: RESOLVED | Noted: 2018-08-03 | Resolved: 2021-04-21

## 2021-04-21 PROBLEM — L97.424 DIABETIC ULCER OF LEFT MIDFOOT ASSOCIATED WITH TYPE 2 DIABETES MELLITUS, WITH NECROSIS OF BONE (HCC): Status: RESOLVED | Noted: 2021-01-22 | Resolved: 2021-04-21

## 2021-04-21 PROBLEM — E11.621 DIABETIC ULCER OF LEFT MIDFOOT ASSOCIATED WITH TYPE 2 DIABETES MELLITUS, WITH FAT LAYER EXPOSED (HCC): Chronic | Status: RESOLVED | Noted: 2018-08-03 | Resolved: 2021-04-21

## 2021-04-21 PROBLEM — E11.621 DIABETIC ULCER OF LEFT MIDFOOT ASSOCIATED WITH TYPE 2 DIABETES MELLITUS, WITH NECROSIS OF BONE (HCC): Status: RESOLVED | Noted: 2021-01-22 | Resolved: 2021-04-21

## 2021-04-21 PROCEDURE — 11042 DBRDMT SUBQ TIS 1ST 20SQCM/<: CPT | Performed by: NURSE PRACTITIONER

## 2021-04-21 PROCEDURE — 11042 DBRDMT SUBQ TIS 1ST 20SQCM/<: CPT

## 2021-04-21 PROCEDURE — 99214 OFFICE O/P EST MOD 30 MIN: CPT

## 2021-04-21 RX ORDER — LIDOCAINE HYDROCHLORIDE 20 MG/ML
JELLY TOPICAL ONCE
Status: COMPLETED | OUTPATIENT
Start: 2021-04-21 | End: 2021-04-21

## 2021-04-21 RX ORDER — LIDOCAINE HYDROCHLORIDE 40 MG/ML
SOLUTION TOPICAL ONCE
Status: CANCELLED | OUTPATIENT
Start: 2021-04-21 | End: 2021-04-21

## 2021-04-21 RX ORDER — LIDOCAINE HYDROCHLORIDE 20 MG/ML
JELLY TOPICAL ONCE
Status: CANCELLED | OUTPATIENT
Start: 2021-04-21 | End: 2021-04-21

## 2021-04-21 RX ORDER — LIDOCAINE 50 MG/G
OINTMENT TOPICAL ONCE
Status: CANCELLED | OUTPATIENT
Start: 2021-04-21 | End: 2021-04-21

## 2021-04-21 RX ORDER — LIDOCAINE 40 MG/G
CREAM TOPICAL ONCE
Status: CANCELLED | OUTPATIENT
Start: 2021-04-21 | End: 2021-04-21

## 2021-04-21 RX ADMIN — LIDOCAINE HYDROCHLORIDE: 20 JELLY TOPICAL at 14:16

## 2021-04-21 ASSESSMENT — PAIN DESCRIPTION - PROGRESSION: CLINICAL_PROGRESSION: NOT CHANGED

## 2021-04-21 ASSESSMENT — PAIN DESCRIPTION - FREQUENCY: FREQUENCY: CONTINUOUS

## 2021-04-21 ASSESSMENT — PAIN DESCRIPTION - PAIN TYPE: TYPE: CHRONIC PAIN

## 2021-04-21 ASSESSMENT — PAIN DESCRIPTION - ORIENTATION: ORIENTATION: LEFT

## 2021-04-21 ASSESSMENT — PAIN SCALES - GENERAL: PAINLEVEL_OUTOF10: 7

## 2021-04-21 ASSESSMENT — PAIN - FUNCTIONAL ASSESSMENT: PAIN_FUNCTIONAL_ASSESSMENT: PREVENTS OR INTERFERES SOME ACTIVE ACTIVITIES AND ADLS

## 2021-04-21 ASSESSMENT — PAIN DESCRIPTION - DESCRIPTORS: DESCRIPTORS: ACHING

## 2021-04-21 NOTE — PROGRESS NOTES
01/26/2017    ESOPHAGECTOMY      cancer    FOOT DEBRIDEMENT Left 1/1/2021    I&D LEFT FOOT WITH REMOVAL OF NONVIABLE BONE AND SOFT TISSUE performed by Katie Gonzales DPM at [facility] Left 1/5/2021    LEFT FOOT DEBRIDEMENT WITH REMOVAL ALL NON VIABLE SOFT TISSUE AND BONE performed by Katie Gonzales DPM at [facility] Right 11/03/2016    I & D heel    FOOT SURGERY Right 12/31/2016    I & D    FRACTURE SURGERY Left 9/5/2015    humerus left, left leg    IR INS PICC VAD W SQ PORT GREATER THAN 5  11/6/2020    IR INS PICC VAD W SQ PORT GREATER THAN 5 11/6/2020 [facility]    IR INS PICC VAD W SQ PORT GREATER THAN 5  1/11/2021    IR INS PICC VAD W SQ PORT GREATER THAN 5 1/11/2021 MD [facility]    IR INS PICC VAD W SQ PORT GREATER THAN 5  4/8/2021    IR INS PICC VAD W SQ PORT GREATER THAN 5 4/8/2021 Viviana Zarco MD [facility]    LEG AMPUTATION BELOW KNEE Right 01/21/2017    LEG AMPUTATION BELOW KNEE Left 2/9/2021    LEFT  LEG AMPUTATION BELOW KNEE performed by Keon Restrepo MD at [facility] Left 4/6/2021    STUMP DEBRIDEMENT INCISION AND DRAINAGE performed by Keon Restrepo MD at [address] Right 2014    rt 3rd through 5th digits    TOE AMPUTATION Left 5/26/2016    left foot 5th toe    TOE AMPUTATION Left 8/5/2020    FOOT TRANSMETATARSAL  AMPUTATION - AND LEFT PECUTANEOUS TENDO ACHILLES LENGTHENING performed by Emre Corona DPM at [address] TOE AMPUTATION Left 8/24/2020    REVISION  TRANSMETATARSAL AMPUTATION WITH DEBRIDEMENT. performed by Emre Corona DPM at [address]      5/14/13- with dilation    VASCULAR SURGERY Right 01/16/2017    foot guillotine amputation       FAMILY HISTORY    Family History   Problem Relation Age of Onset    Diabetes Mother     Cancer Mother     Alcohol Abuse Father     Cancer Sister    [facility] Alcohol Abuse Maternal Aunt     Alcohol Abuse Maternal Uncle     Alcohol Abuse Paternal Aunt        SOCIAL HISTORY    Social History     Tobacco Use    Smoking status: Current Some Day Smoker     Packs/day: 1.00     Years: 30.00     Pack years: 30.00     Types: Cigarettes     Last attempt to quit: 2020     Years since quittin.4    Smokeless tobacco: Former User     Types: Chew     Quit date:    Substance Use Topics    Alcohol use: Yes     Alcohol/week: 0.0 standard drinks     Frequency: Patient refused     Drinks per session: Patient refused     Binge frequency: Patient refused     Comment:  states occ    Drug use: Not Currently     Types: Marijuana     Comment: last marijuana 1 week ago       ALLERGIES    Allergies   Allergen Reactions    Gabapentin Other (See Comments)     dizziness    Other        MEDICATIONS    Current Outpatient Medications on File Prior to Encounter   Medication Sig Dispense Refill    glucose (GLUTOSE) 40 % GEL Take 37.5 mLs by mouth as needed (hypoglycemia) 45 g 1    insulin lispro (HUMALOG) 100 UNIT/ML injection vial Inject 0-6 Units into the skin 3 times daily (with meals) 1 vial 3    insulin lispro (HUMALOG) 100 UNIT/ML injection vial Inject 0-3 Units into the skin nightly 1 vial 3    bisacodyl (DULCOLAX) 5 MG EC tablet Take 1 tablet by mouth daily as needed for Constipation      polyethylene glycol (GLYCOLAX) 17 g packet Take 17 g by mouth daily as needed for Constipation 527 g 1    sennosides-docusate sodium (SENOKOT-S) 8.6-50 MG tablet Take 2 tablets by mouth 2 times daily      tamsulosin (FLOMAX) 0.4 MG capsule Take 1 capsule by mouth daily 30 capsule 3    cefepime (MAXIPIME) infusion Infuse 2,000 mg intravenously every 12 hours Compound per protocol      vancomycin (VANCOCIN) infusion Infuse 1,000 mg intravenously every 12 hours Compound per protocol.       hydrOXYzine (VISTARIL) 25 MG capsule Take 25 mg by mouth 3 times daily as needed      amitriptyline (ELAVIL) 75 MG tablet Take 1 tablet by mouth nightly 30 tablet 3    glucose monitoring kit (FREESTYLE) monitoring kit 1 kit by Does not apply route daily 1 kit 0    blood glucose test strips (ASCENSIA AUTODISC VI;ONE TOUCH ULTRA TEST VI) strip Use with associated glucose meter to check fluctuating blood sugars  strip 11    Lancets MISC 1 each by Does not apply route 3 times daily 600 each 1    famotidine (PEPCID) 20 MG tablet Take 20 mg by mouth 2 times daily      budesonide-formoterol (SYMBICORT) 160-4.5 MCG/ACT AERO Inhale 2 puffs into the lungs 2 times daily 1 Inhaler 3    ipratropium-albuterol (DUONEB) 0.5-2.5 (3) MG/3ML SOLN nebulizer solution Inhale 3 mLs into the lungs every 4 hours as needed for Shortness of Breath (Patient not taking: Reported on 3/19/2021) 360 mL 1    insulin glargine (LANTUS) 100 UNIT/ML injection vial Inject 25 Units into the skin Daily with supper (Patient not taking: Reported on 3/19/2021) 1 vial 3    lactobacillus (BACID) TABS Take 1 tablet by mouth 2 times daily 30 tablet 0    apixaban (ELIQUIS) 5 MG TABS tablet Take 1 tablet by mouth 2 times daily 180 tablet 1    albuterol sulfate HFA (VENTOLIN HFA) 108 (90 Base) MCG/ACT inhaler Inhale 2 puffs into the lungs every 6 hours as needed for Wheezing      metFORMIN (GLUCOPHAGE) 500 MG tablet Take 1 tablet by mouth 2 times daily (with meals) 180 tablet 5     No current facility-administered medications on file prior to encounter. REVIEW OF SYSTEMS    Pertinent items are noted in HPI.     Objective:      /68   Pulse 87   Temp 97.7 °F (36.5 °C) (Tympanic)   Resp 18   Ht 6' (1.829 m)   Wt 153 lb (69.4 kg)   BMI 20.75 kg/m²     Wt Readings from Last 3 Encounters:   04/21/21 153 lb (69.4 kg)   04/12/21 153 lb 1.6 oz (69.4 kg)   04/12/21 153 lb 1.6 oz (69.4 kg)       PHYSICAL EXAM    General Appearance: alert and oriented to person, place and time, well-developed and well-nourished, in no acute distress  Skin: warm and dry, no rash or erythema, left leg ulcers   Head: normocephalic and atraumatic  Eyes: pupils equal, round, extraocular eye movements intact, and conjunctivae normal  Pulmonary/Chest: normal air movement, no respiratory distress  Extremities: no cyanosis and no clubbing  Musculoskeletal: no joint swelling or tenderness. Bilateral BKA   Neurologic: wheelchair bound, coordination normal and speech normal      Assessment:     Problem List Items Addressed This Visit     DM type 2 with diabetic peripheral neuropathy ([address]    History of below knee amputation, left (HCC)    Relevant Orders    Supply: Wound Cleanser    Supply: Cover and Secure    History of below knee amputation, right (HCC)    Relevant Orders    Supply: Wound Cleanser    Supply: Cover and Secure    Leg ulcer, left, with fat layer exposed ([facility] [address] - Primary    Relevant Orders    Supply: Wound Cleanser    Supply: Cover and Secure    Moderate malnutrition (HCC) (Chronic)    Moderate protein malnutrition (HCC)    Relevant Orders    Supply: Wound Cleanser    Supply: Cover and Secure    PAD (peripheral artery disease) (HCC)    Relevant Orders    Supply: Wound Cleanser    Supply: Cover and Secure    Tobacco dependence    Relevant Orders    Supply: Wound Cleanser    Supply: Cover and Secure    Type 2 diabetes mellitus with diabetic polyneuropathy, with long-term current use of insulin (HCC)    Relevant Orders    Supply: Wound Cleanser    Supply: Cover and Secure    Uncontrolled type 2 diabetes mellitus with foot ulcer ([address]           Procedure Note  Indications:  Based on my examination of this patient's wound(s)/ulcer(s) today, debridement is required to promote healing and evaluate the wound base.     Performed by: MARCELO Orr - CNP    Consent obtained:  Yes    Time out taken:  Yes    Pain Control: Anesthetic  Anesthetic: 2% Lidocaine Gel Topical       Debridement:Excisional Debridement    Using curette the wound(s)/ulcer(s) was/were sharply debrided down through and including the removal of subcutaneous tissue. Devitalized Tissue Debrided:  fibrin, biofilm and slough    Pre Debridement Measurements:  Are located in the Wound/Ulcer Documentation Flow Sheet    Wound/Ulcer #: 1, 2 and 3    Post Debridement Measurements:  Wound/Ulcer Descriptions are Pre Debridement except measurements:    Negative Pressure Wound Therapy Leg Anterior; Left (Active)   Wound Type Surgical 04/12/21 0822   Unit Type KCI 04/12/21 0738   Dressing Type Black foam 04/12/21 0822   Number of pieces used 3 04/12/21 0738   Cycle Continuous 04/12/21 0822   Target Pressure (mmHg) 125 04/12/21 0822   Canister changed? No 04/12/21 0822   Dressing Status Other (Comment) 04/12/21 1359   Dressing Changed Changed/New 04/12/21 1359   Drainage Amount Small 04/12/21 0822   Drainage Description Serosanguinous 04/12/21 0822   Dressing Change Due 04/12/21 04/12/21 0822   Wound Assessment Other (Comment) 04/12/21 0822   Odalys-wound Assessment Red; Swelling 04/12/21 0822   Number of days: 15       Wound 04/21/21 Leg Left;Distal;Medial #1 (Active)   Wound Image   04/21/21 1331   Dressing Status New dressing applied; Reinforced dressing 04/21/21 1331   Wound Cleansed Irrigated with saline 04/21/21 1331   Wound Length (cm) 1.2 cm 04/21/21 1331   Wound Width (cm) 2.2 cm 04/21/21 1331   Wound Depth (cm) 0.4 cm 04/21/21 1331   Wound Surface Area (cm^2) 2.64 cm^2 04/21/21 1331   Wound Volume (cm^3) 1.06 cm^3 04/21/21 1331   Post-Procedure Length (cm) 1.2 cm 04/21/21 1331   Post-Procedure Width (cm) 2.2 cm 04/21/21 1331   Post-Procedure Depth (cm) 0.4 cm 04/21/21 1331   Post-Procedure Surface Area (cm^2) 2.64 cm^2 04/21/21 1331   Post-Procedure Volume (cm^3) 1.06 cm^3 04/21/21 1331   Wound Assessment Pink/red;Slough 04/21/21 1331   Drainage Amount Moderate 04/21/21 1331   Drainage Description Serosanguinous 04/21/21 1331   Odor None 04/21/21 1331   Odalys-wound Assessment Maceration 04/21/21 1331   Margins Defined edges 04/21/21 1331   Wound Thickness Description not for Pressure Injury Full thickness 04/21/21 1331   Number of days: 0       Wound 04/21/21 Leg Left;Distal;Lateral #2 (Active)   Wound Image   04/21/21 1331   Dressing Status New dressing applied; Reinforced dressing 04/21/21 1331   Wound Cleansed Cleansed with saline 04/21/21 1331   Wound Length (cm) 1.1 cm 04/21/21 1331   Wound Width (cm) 2.2 cm 04/21/21 1331   Wound Depth (cm) 1.1 cm 04/21/21 1331   Wound Surface Area (cm^2) 2.42 cm^2 04/21/21 1331   Wound Volume (cm^3) 2.66 cm^3 04/21/21 1331   Post-Procedure Length (cm) 1.1 cm 04/21/21 1331   Post-Procedure Width (cm) 2.2 cm 04/21/21 1331   Post-Procedure Depth (cm) 1.1 cm 04/21/21 1331   Post-Procedure Surface Area (cm^2) 2.42 cm^2 04/21/21 1331   Post-Procedure Volume (cm^3) 2.66 cm^3 04/21/21 1331   Wound Assessment Pink/red;Slough 04/21/21 1331   Drainage Amount Moderate 04/21/21 1331   Drainage Description Serosanguinous 04/21/21 1331   Odor None 04/21/21 1331   Doalys-wound Assessment Maceration;Blanchable erythema 04/21/21 1331   Margins Defined edges 04/21/21 1331   Wound Thickness Description not for Pressure Injury Full thickness 04/21/21 1331   Number of days: 0       Wound 04/21/21 Leg Left;Proximal #3 (Active)   Wound Image   04/21/21 1331   Dressing Status New dressing applied; Reinforced dressing 04/21/21 1331   Wound Cleansed Irrigated with saline 04/21/21 1331   Wound Length (cm) 0.7 cm 04/21/21 1331   Wound Width (cm) 0.8 cm 04/21/21 1331   Wound Depth (cm) 0.1 cm 04/21/21 1331   Wound Surface Area (cm^2) 0.56 cm^2 04/21/21 1331   Wound Volume (cm^3) 0.06 cm^3 04/21/21 1331   Post-Procedure Length (cm) 0.7 cm 04/21/21 1331   Post-Procedure Width (cm) 0.8 cm 04/21/21 1331   Post-Procedure Depth (cm) 0.1 cm 04/21/21 1331   Post-Procedure Surface Area (cm^2) 0.56 cm^2 04/21/21 1331   Post-Procedure Volume (cm^3) 0.06 cm^3 04/21/21 1331   Wound Assessment [facility] 04/21/21 1331   Drainage Amount Moderate 04/21/21 1331   Drainage Description Serosanguinous 04/21/21 1331   Odor None 04/21/21 1331   Odalys-wound Assessment Blanchable erythema 04/21/21 1331   Margins Defined edges 04/21/21 1331   Wound Thickness Description not for Pressure Injury Full thickness 04/21/21 1331   Number of days: 0       Percent of Wound(s)/Ulcer(s) Debrided: 100%    Total Surface Area Debrided:  5.62 sq cm       Diabetic/Pressure/Non Pressure Ulcers only:  Ulcer: Non-Pressure ulcer, fat layer exposed    Estimated Blood Loss:  Minimal    Hemostasis Achieved:  by pressure    Procedural Pain:  0  / 10     Post Procedural Pain:  0 / 10     Response to treatment:  Well tolerated by patient. Plan:     Treatment Note please see attached Discharge Instructions    Written patient dismissal instructions given to patient and signed by patient or POA. Discharge Instructions          Abelardo Rivera -Phone: [phone] Fax: [phone]             Visit  Discharge Instructions / Physician Orders     DATE: 4/21/2021     Home Care: [address]:      Wound Location: Left stump site X3     Cleanse with: Liquid antibacterial soap and water, rinse well      Dressing Orders: Distal 2 wounds: Apply skin prep around wounds, apply drape around wounds, black foam into wounds, continuous low intensity suction at 125mmHg-Change on Rchicx-[id number]-[id number] (Send wound vac supplies with patient to wound care appointment and wound care will apply vac.                                   Proximal wound: Beverley into wound, silicon border gauze (Gentac)-change daily    Wet to dry dressing done today on distal wounds, please apply vac upon patient return to facility     Frequency: Proximal-daily                       Distal X2- Monday, Wednesday, Friday     Additional Orders: Increase protein to diet (meat, cheese, eggs, fish, peanut butter, nuts and beans)  Multivitamin daily  ELEVATE LEGS AS MUCH AS POSSIBLE     Your next appointment with [address] [place] [address] is in 1 week with Samuel Issa NP                                                                                                   ROOM TYPE   [x]? CHAIR     []? STRETCHER  []? EITHER             (Please note your next appointment above and if you are unable to keep, kindly give a 24 hour notice. Thank you.)     If you experience any of the following, please call the [address] [place] [address] during business hours:  [phone]  Your Phone call may be forwarded to [address] during business hours that [facility] is closed.     * Increase in Pain  * Temperature over 101  * Increase in drainage from your wound  * Drainage with a foul odor  * Bleeding  * Increase in swelling  * Need for compression bandage changes due to slippage, breakthrough drainage.     If you need medical attention outside of the business hours of the [address] [place] [address] please contact your PCP or go to the nearest emergency room. The information contained in the After Visit Summary has been reviewed with me, the patient and/or responsible adult, by my health care provider(s). I had the opportunity to ask questions regarding this information. I have elected to receive;      []? After Visit Summary  [x]? Comprehensive Discharge Instruction        Patient signature______________________________________Date:________  Electronically signed by Kyara Conde RN on 4/21/2021 at 2:15 PM  Electronically signed by MARCELO Kraft CNP on 4/21/2021 at 2:16 PM          Electronically signed by MARCELO Kraft CNP on 4/21/2021 at 2:17 PM

## 2021-04-22 PROBLEM — Z72.0 TOBACCO ABUSE: Status: ACTIVE | Noted: 2020-07-09

## 2021-04-22 PROBLEM — Z89.9 S/P AMPUTATION: Status: ACTIVE | Noted: 2020-07-08

## 2021-04-28 ENCOUNTER — HOSPITAL ENCOUNTER (OUTPATIENT)
Dept: WOUND CARE | Age: 66
Discharge: HOME | End: 2021-04-28
Payer: MEDICARE

## 2021-04-28 VITALS
TEMPERATURE: 97.5 F | HEART RATE: 101 BPM | RESPIRATION RATE: 18 BRPM | SYSTOLIC BLOOD PRESSURE: 110 MMHG | DIASTOLIC BLOOD PRESSURE: 74 MMHG

## 2021-04-28 DIAGNOSIS — E44.0 MODERATE PROTEIN MALNUTRITION (HCC): ICD-10-CM

## 2021-04-28 DIAGNOSIS — L97.922 LEG ULCER, LEFT, WITH FAT LAYER EXPOSED (HCC): ICD-10-CM

## 2021-04-28 DIAGNOSIS — Z89.512 HISTORY OF BELOW KNEE AMPUTATION, LEFT (HCC): Primary | ICD-10-CM

## 2021-04-28 DIAGNOSIS — Z79.4 TYPE 2 DIABETES MELLITUS WITH DIABETIC POLYNEUROPATHY, WITH LONG-TERM CURRENT USE OF INSULIN (HCC): ICD-10-CM

## 2021-04-28 DIAGNOSIS — F17.200 TOBACCO DEPENDENCE: ICD-10-CM

## 2021-04-28 DIAGNOSIS — E11.42 TYPE 2 DIABETES MELLITUS WITH DIABETIC POLYNEUROPATHY, WITH LONG-TERM CURRENT USE OF INSULIN (HCC): ICD-10-CM

## 2021-04-28 DIAGNOSIS — I73.9 PAD (PERIPHERAL ARTERY DISEASE) (HCC): ICD-10-CM

## 2021-04-28 DIAGNOSIS — Z89.511 HISTORY OF BELOW KNEE AMPUTATION, RIGHT (HCC): ICD-10-CM

## 2021-04-28 DIAGNOSIS — M86.672 CHRONIC OSTEOMYELITIS INVOLVING LEFT ANKLE AND FOOT (HCC): ICD-10-CM

## 2021-04-28 PROCEDURE — 11042 DBRDMT SUBQ TIS 1ST 20SQCM/<: CPT

## 2021-04-28 PROCEDURE — 97605 NEG PRS WND THER DME<=50SQCM: CPT

## 2021-04-28 PROCEDURE — 11042 DBRDMT SUBQ TIS 1ST 20SQCM/<: CPT | Performed by: NURSE PRACTITIONER

## 2021-04-28 RX ORDER — LIDOCAINE 50 MG/G
OINTMENT TOPICAL ONCE
Status: CANCELLED | OUTPATIENT
Start: 2021-04-28 | End: 2021-04-28

## 2021-04-28 RX ORDER — LIDOCAINE HYDROCHLORIDE 20 MG/ML
JELLY TOPICAL ONCE
Status: CANCELLED | OUTPATIENT
Start: 2021-04-28 | End: 2021-04-28

## 2021-04-28 RX ORDER — LIDOCAINE HYDROCHLORIDE 20 MG/ML
JELLY TOPICAL ONCE
Status: COMPLETED | OUTPATIENT
Start: 2021-04-28 | End: 2021-04-28

## 2021-04-28 RX ORDER — LIDOCAINE HYDROCHLORIDE 40 MG/ML
SOLUTION TOPICAL ONCE
Status: CANCELLED | OUTPATIENT
Start: 2021-04-28 | End: 2021-04-28

## 2021-04-28 RX ORDER — LIDOCAINE 40 MG/G
CREAM TOPICAL ONCE
Status: CANCELLED | OUTPATIENT
Start: 2021-04-28 | End: 2021-04-28

## 2021-04-28 RX ADMIN — LIDOCAINE HYDROCHLORIDE: 20 JELLY TOPICAL at 13:21

## 2021-04-28 ASSESSMENT — PAIN SCALES - GENERAL: PAINLEVEL_OUTOF10: 7

## 2021-04-28 ASSESSMENT — PAIN DESCRIPTION - LOCATION: LOCATION: LEG

## 2021-04-28 ASSESSMENT — PAIN DESCRIPTION - PAIN TYPE: TYPE: CHRONIC PAIN

## 2021-04-28 ASSESSMENT — PAIN DESCRIPTION - ORIENTATION: ORIENTATION: LEFT

## 2021-04-28 NOTE — PROGRESS NOTES
History     Tobacco Use    Smoking status: Current Some Day Smoker     Packs/day: 1.00     Years: 30.00     Pack years: 30.00     Types: Cigarettes     Last attempt to quit: 2020     Years since quittin.4    Smokeless tobacco: Former User     Types: Chew     Quit date:    Substance Use Topics    Alcohol use: Yes     Alcohol/week: 0.0 standard drinks     Frequency: Patient refused     Drinks per session: Patient refused     Binge frequency: Patient refused     Comment:  states occ    Drug use: Not Currently     Types: Marijuana     Comment: last marijuana 1 week ago       ALLERGIES    Allergies   Allergen Reactions    Gabapentin Other (See Comments)     dizziness    Other        MEDICATIONS    Current Outpatient Medications on File Prior to Encounter   Medication Sig Dispense Refill    glucose (GLUTOSE) 40 % GEL Take 37.5 mLs by mouth as needed (hypoglycemia) 45 g 1    insulin lispro (HUMALOG) 100 UNIT/ML injection vial Inject 0-6 Units into the skin 3 times daily (with meals) 1 vial 3    insulin lispro (HUMALOG) 100 UNIT/ML injection vial Inject 0-3 Units into the skin nightly 1 vial 3    bisacodyl (DULCOLAX) 5 MG EC tablet Take 1 tablet by mouth daily as needed for Constipation      polyethylene glycol (GLYCOLAX) 17 g packet Take 17 g by mouth daily as needed for Constipation 527 g 1    sennosides-docusate sodium (SENOKOT-S) 8.6-50 MG tablet Take 2 tablets by mouth 2 times daily      tamsulosin (FLOMAX) 0.4 MG capsule Take 1 capsule by mouth daily 30 capsule 3    cefepime (MAXIPIME) infusion Infuse 2,000 mg intravenously every 12 hours Compound per protocol      vancomycin (VANCOCIN) infusion Infuse 1,000 mg intravenously every 12 hours Compound per protocol.       hydrOXYzine (VISTARIL) 25 MG capsule Take 25 mg by mouth 3 times daily as needed      amitriptyline (ELAVIL) 75 MG tablet Take 1 tablet by mouth nightly 30 tablet 3    glucose monitoring kit (FREESTYLE) monitoring kit 1 kit by Does not apply route daily 1 kit 0    blood glucose test strips (ASCENSIA AUTODISC VI;ONE TOUCH ULTRA TEST VI) strip Use with associated glucose meter to check fluctuating blood sugars  strip 11    Lancets MISC 1 each by Does not apply route 3 times daily 600 each 1    famotidine (PEPCID) 20 MG tablet Take 20 mg by mouth 2 times daily      budesonide-formoterol (SYMBICORT) 160-4.5 MCG/ACT AERO Inhale 2 puffs into the lungs 2 times daily 1 Inhaler 3    ipratropium-albuterol (DUONEB) 0.5-2.5 (3) MG/3ML SOLN nebulizer solution Inhale 3 mLs into the lungs every 4 hours as needed for Shortness of Breath (Patient not taking: Reported on 3/19/2021) 360 mL 1    insulin glargine (LANTUS) 100 UNIT/ML injection vial Inject 25 Units into the skin Daily with supper (Patient not taking: Reported on 3/19/2021) 1 vial 3    lactobacillus (BACID) TABS Take 1 tablet by mouth 2 times daily 30 tablet 0    apixaban (ELIQUIS) 5 MG TABS tablet Take 1 tablet by mouth 2 times daily 180 tablet 1    albuterol sulfate HFA (VENTOLIN HFA) 108 (90 Base) MCG/ACT inhaler Inhale 2 puffs into the lungs every 6 hours as needed for Wheezing      metFORMIN (GLUCOPHAGE) 500 MG tablet Take 1 tablet by mouth 2 times daily (with meals) 180 tablet 5     No current facility-administered medications on file prior to encounter.         REVIEW OF SYSTEMS    Pertinent items are noted in HPI    Objective:      /74   Pulse 101   Temp 97.5 °F (36.4 °C) (Tympanic)   Resp 18     Wt Readings from Last 3 Encounters:   04/21/21 153 lb (69.4 kg)   04/12/21 153 lb 1.6 oz (69.4 kg)   04/12/21 153 lb 1.6 oz (69.4 kg)       PHYSICAL EXAM    General Appearance: alert and oriented to person, place and time, well-developed and well-nourished, in no acute distress  Skin: warm and dry, no rash or erythema, left leg ulcers   Head: normocephalic and atraumatic  Eyes: pupils equal, round, extraocular eye movements intact, and conjunctivae normal  Pulmonary/Chest: normal air movement, no respiratory distress  Extremities: no cyanosis and no clubbing   Musculoskeletal: no joint swelling or tenderness. Bilateral BKA   Neurologic: wheelchair bound, coordination normal and speech normal      Assessment:     Problem List Items Addressed This Visit     History of below knee amputation, left ([facility]) - Primary    Relevant Orders    Supply: Wound Cleanser    Supply: Wound Dressings    Supply: Cover and Secure    Neg. Pressure Wound Therapy    History of below knee amputation, right ([facility])    Relevant Orders    Supply: Wound Cleanser    Supply: Wound Dressings    Supply: Cover and Secure    Neg. Pressure Wound Therapy    Leg ulcer, left, with fat layer exposed ([facility]    Relevant Orders    Supply: Wound Cleanser    Supply: Wound Dressings    Supply: Cover and Secure    Neg. Pressure Wound Therapy    Moderate protein malnutrition ([facility])    Relevant Orders    Supply: Wound Cleanser    Supply: Wound Dressings    Supply: Cover and Secure    Neg. Pressure Wound Therapy    Osteomyelitis, chronic, ankle or foot ([facility])    PAD (peripheral artery disease) ([facility])    Relevant Orders    Supply: Wound Cleanser    Supply: Wound Dressings    Supply: Cover and Secure    Neg. Pressure Wound Therapy    Tobacco dependence    Relevant Orders    Supply: Wound Cleanser    Supply: Wound Dressings    Supply: Cover and Secure    Neg. Pressure Wound Therapy    Type 2 diabetes mellitus with diabetic polyneuropathy, with long-term current use of insulin ([facility])    Relevant Orders    Supply: Wound Cleanser    Supply: Wound Dressings    Supply: Cover and Secure    Neg. Pressure Wound Therapy    Uncontrolled type 2 diabetes mellitus with foot ulcer ([facility] [address]           Procedure Note  Indications:  Based on my examination of this patient's wound(s)/ulcer(s) today, debridement is required to promote healing and evaluate the wound base.     Performed by: MARCELO Russo - CNP    Consent obtained: Yes    Time out taken:  Yes    Pain Control:         Debridement:Excisional Debridement    Using curette, scissors and forceps the wound(s)/ulcer(s) was/were sharply debrided down through and including the removal of subcutaneous tissue. Devitalized Tissue Debrided:  fibrin, biofilm and slough    Pre Debridement Measurements:  Are located in the Wound/Ulcer Documentation Flow Sheet    Wound/Ulcer #: 1 and 2    Post Debridement Measurements:  Wound/Ulcer Descriptions are Pre Debridement except measurements:    Negative Pressure Wound Therapy Leg Anterior; Left (Active)   Wound Type Surgical 04/12/21 0822   Unit Type KCI 04/12/21 0738   Dressing Type Black foam 04/12/21 0822   Number of pieces used 3 04/12/21 0738   Cycle Continuous 04/12/21 0822   Target Pressure (mmHg) 125 04/12/21 0822   Canister changed? No 04/12/21 0822   Dressing Status Other (Comment) 04/12/21 1359   Dressing Changed Changed/New 04/12/21 1359   Drainage Amount Small 04/12/21 0822   Drainage Description Serosanguinous 04/12/21 0822   Dressing Change Due 04/12/21 04/12/21 0822   Wound Assessment Other (Comment) 04/12/21 0822   Odalys-wound Assessment Red; Swelling 04/12/21 0822   Number of days: 22       Wound 04/21/21 Leg Left;Distal;Medial #1 (Active)   Wound Image   04/21/21 1331   Wound Etiology Non-Healing Surgical 04/28/21 1317   Dressing Status Old drainage noted;New drainage noted 04/28/21 1317   Wound Cleansed Irrigated with saline 04/28/21 1317   Wound Length (cm) 1 cm 04/28/21 1317   Wound Width (cm) 1.8 cm 04/28/21 1317   Wound Depth (cm) 0.1 cm 04/28/21 1317   Wound Surface Area (cm^2) 1.8 cm^2 04/28/21 1317   Change in Wound Size % (l*w) 31.82 04/28/21 1317   Wound Volume (cm^3) 0.18 cm^3 04/28/21 1317   Wound Healing % 83 04/28/21 1317   Post-Procedure Length (cm) 1 cm 04/28/21 1317   Post-Procedure Width (cm) 1.8 cm 04/28/21 1317   Post-Procedure Depth (cm) 0.1 cm 04/28/21 1317   Post-Procedure Surface Area (cm^2) 1.8 cm^2 04/28/21 1317   Post-Procedure Volume (cm^3) 0.18 cm^3 04/28/21 1317   Wound Assessment Pink/red;Slough 04/28/21 1317   Drainage Amount Moderate 04/28/21 1317   Drainage Description Serosanguinous 04/28/21 1317   Odor None 04/28/21 1317   Odalys-wound Assessment Blanchable erythema;Edematous 04/28/21 1317   Margins Defined edges 04/28/21 1317   Wound Thickness Description not for Pressure Injury Full thickness 04/28/21 1317   Number of days: 6       Wound 04/21/21 Leg Left;Distal;Lateral #2 (Active)   Wound Image   04/21/21 1331   Dressing Status New dressing applied; Reinforced dressing 04/28/21 1317   Wound Cleansed Cleansed with saline 04/28/21 1317   Wound Length (cm) 1.7 cm 04/28/21 1317   Wound Width (cm) 1.9 cm 04/28/21 1317   Wound Depth (cm) 0.7 cm 04/28/21 1317   Wound Surface Area (cm^2) 3.23 cm^2 04/28/21 1317   Change in Wound Size % (l*w) -33.47 04/28/21 1317   Wound Volume (cm^3) 2.26 cm^3 04/28/21 1317   Wound Healing % 15 04/28/21 1317   Post-Procedure Length (cm) 1.7 cm 04/28/21 1317   Post-Procedure Width (cm) 1.9 cm 04/28/21 1317   Post-Procedure Depth (cm) 0.7 cm 04/28/21 1317   Post-Procedure Surface Area (cm^2) 3.23 cm^2 04/28/21 1317   Post-Procedure Volume (cm^3) 2.26 cm^3 04/28/21 1317   Wound Assessment Pink/red;Slough 04/28/21 1317   Drainage Amount Moderate 04/28/21 1317   Drainage Description Serosanguinous 04/28/21 1317   Odor None 04/28/21 1317   Odalys-wound Assessment Maceration;Blanchable erythema;Edematous 04/28/21 1317   Margins Defined edges 04/28/21 1317   Wound Thickness Description not for Pressure Injury Full thickness 04/28/21 1317   Number of days: 6       Wound 04/21/21 Leg Left;Proximal #3 (Active)   Wound Image   04/21/21 1331   Dressing Status New dressing applied; Reinforced dressing 04/28/21 1317   Wound Cleansed Irrigated with saline 04/28/21 1317   Wound Length (cm) 0 cm 04/28/21 1317   Wound Width (cm) 0 cm 04/28/21 1317   Wound Depth (cm) 0 cm 04/28/21 1317   Wound Surface Area (cm^2) 0 cm^2 04/28/21 1317   Change in Wound Size % (l*w) 100 04/28/21 1317   Wound Volume (cm^3) 0 cm^3 04/28/21 1317   Wound Healing % 100 04/28/21 1317   Post-Procedure Length (cm) 0 cm 04/28/21 1317   Post-Procedure Width (cm) 0 cm 04/28/21 1317   Post-Procedure Depth (cm) 0 cm 04/28/21 1317   Post-Procedure Surface Area (cm^2) 0 cm^2 04/28/21 1317   Post-Procedure Volume (cm^3) 0 cm^3 04/28/21 1317   Wound Assessment Slough 04/21/21 1331   Drainage Amount Moderate 04/21/21 1331   Drainage Description Serosanguinous 04/21/21 1331   Odor None 04/21/21 1331   Odalys-wound Assessment Blanchable erythema 04/21/21 1331   Margins Defined edges 04/21/21 1331   Wound Thickness Description not for Pressure Injury Full thickness 04/21/21 1331   Number of days: 6       Percent of Wound(s)/Ulcer(s) Debrided: 100%    Total Surface Area Debrided:  5.03 sq cm     Diabetic/Pressure/Non Pressure Ulcers only:  Ulcer: Non-Pressure ulcer, fat layer exposed      Estimated Blood Loss:  Minimal    Hemostasis Achieved:  by pressure    Procedural Pain:  0  / 10     Post Procedural Pain:  0 / 10     Response to treatment:  Well tolerated by patient. Plan:     Treatment Note please see attached Discharge Instructions    Written patient dismissal instructions given to patient and signed by patient or POA.          Discharge Instructions          [address]: [phone] Fax: [phone]             Visit Cassie Instructions / Physician Orders     DATE: 4/28/2021     Home Care: [address]     SUPPLIES ORDERED THRU:      Wound Location: Left stump site X3     Cleanse with: Liquid antibacterial soap and water, rinse well      Dressing Orders: Distal 2 wounds: Apply skin prep around wounds, apply drape around wounds, black foam into wounds, continuous low intensity suction at 125mmHg-Change on [place] (Send wound vac supplies with patient to wound care appointment and wound care will apply vac.)     Frequency: Proximal-daily                       Distal X2- Monday, Wednesday, Friday     Additional Orders: Increase protein to diet (meat, cheese, eggs, fish, peanut butter, nuts and beans)  Multivitamin daily  ELEVATE LEGS AS MUCH AS POSSIBLE     Your next appointment with [address] [place] [address] is in 1 week with Delia Gonzalez NP                                                                                                   ROOM TYPE   [x]? ? CHAIR     []? ? STRETCHER  []?? EITHER             (Please note your next appointment above and if you are unable to keep, kindly give a 24 hour notice. Thank you.)     If you experience any of the following, please call the [address] [place] [address] during business hours:  [phone]  Your Phone call may be forwarded to [id number] during business hours that [facility] is closed.     * Increase in Pain  * Temperature over 101  * Increase in drainage from your wound  * Drainage with a foul odor  * Bleeding  * Increase in swelling  * Need for compression bandage changes due to slippage, breakthrough drainage.     If you need medical attention outside of the business hours of the [address] [place] [address] please contact your PCP or go to the nearest emergency room.     The information contained in the After Visit Summary has been reviewed with me, the patient and/or responsible adult, by my health care provider(s). I had the opportunity to ask questions regarding this information. I have elected to receive;      []? ? After Visit Summary  [x]? ? Comprehensive Discharge Instruction        Patient signature______________________________________Date:________  Electronically signed by Georgia Quiñonez RN on 4/28/2021 at 1:27 PM  Electronically signed by MARCELO Quinteros CNP on 4/28/2021 at 1:32 PM          Electronically signed by MARCELO Quinteros CNP on 4/28/2021 at 1:37 PM

## 2021-05-03 ENCOUNTER — OFFICE VISIT (OUTPATIENT)
Dept: INFECTIOUS DISEASES | Age: 66
End: 2021-05-03
Payer: MEDICARE

## 2021-05-03 VITALS
DIASTOLIC BLOOD PRESSURE: 78 MMHG | RESPIRATION RATE: 18 BRPM | HEIGHT: 72 IN | WEIGHT: 153 LBS | OXYGEN SATURATION: 97 % | SYSTOLIC BLOOD PRESSURE: 136 MMHG | TEMPERATURE: 98.1 F | BODY MASS INDEX: 20.72 KG/M2 | HEART RATE: 89 BPM

## 2021-05-03 DIAGNOSIS — T87.40 INFECTION OF BELOW KNEE AMPUTATION STUMP (HCC): Primary | ICD-10-CM

## 2021-05-03 PROCEDURE — 99213 OFFICE O/P EST LOW 20 MIN: CPT | Performed by: INTERNAL MEDICINE

## 2021-05-03 ASSESSMENT — ENCOUNTER SYMPTOMS
RESPIRATORY NEGATIVE: 1
DIARRHEA: 1
ALLERGIC/IMMUNOLOGIC NEGATIVE: 1
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
## 2021-05-05 ENCOUNTER — HOSPITAL ENCOUNTER (OUTPATIENT)
Dept: WOUND CARE | Age: 66
Discharge: HOME | End: 2021-05-05
Payer: MEDICARE

## 2021-05-05 VITALS
SYSTOLIC BLOOD PRESSURE: 118 MMHG | RESPIRATION RATE: 1 BRPM | TEMPERATURE: 96.9 F | DIASTOLIC BLOOD PRESSURE: 58 MMHG | HEART RATE: 99 BPM

## 2021-05-05 DIAGNOSIS — F17.200 TOBACCO DEPENDENCE: ICD-10-CM

## 2021-05-05 DIAGNOSIS — Z89.512 HISTORY OF BELOW KNEE AMPUTATION, LEFT (HCC): Primary | ICD-10-CM

## 2021-05-05 DIAGNOSIS — Z79.4 TYPE 2 DIABETES MELLITUS WITH DIABETIC POLYNEUROPATHY, WITH LONG-TERM CURRENT USE OF INSULIN (HCC): ICD-10-CM

## 2021-05-05 DIAGNOSIS — L97.922 LEG ULCER, LEFT, WITH FAT LAYER EXPOSED (HCC): ICD-10-CM

## 2021-05-05 DIAGNOSIS — Z89.511 HISTORY OF BELOW KNEE AMPUTATION, RIGHT (HCC): ICD-10-CM

## 2021-05-05 DIAGNOSIS — E11.42 TYPE 2 DIABETES MELLITUS WITH DIABETIC POLYNEUROPATHY, WITH LONG-TERM CURRENT USE OF INSULIN (HCC): ICD-10-CM

## 2021-05-05 DIAGNOSIS — I73.9 PAD (PERIPHERAL ARTERY DISEASE) (HCC): ICD-10-CM

## 2021-05-05 DIAGNOSIS — E44.0 MODERATE PROTEIN MALNUTRITION (HCC): ICD-10-CM

## 2021-05-05 PROCEDURE — 11042 DBRDMT SUBQ TIS 1ST 20SQCM/<: CPT

## 2021-05-05 PROCEDURE — 11042 DBRDMT SUBQ TIS 1ST 20SQCM/<: CPT | Performed by: NURSE PRACTITIONER

## 2021-05-05 RX ORDER — LIDOCAINE HYDROCHLORIDE 20 MG/ML
JELLY TOPICAL ONCE
Status: COMPLETED | OUTPATIENT
Start: 2021-05-05 | End: 2021-05-05

## 2021-05-05 RX ORDER — LIDOCAINE 50 MG/G
OINTMENT TOPICAL ONCE
Status: CANCELLED | OUTPATIENT
Start: 2021-05-05 | End: 2021-05-05

## 2021-05-05 RX ORDER — LIDOCAINE HYDROCHLORIDE 20 MG/ML
JELLY TOPICAL ONCE
Status: CANCELLED | OUTPATIENT
Start: 2021-05-05 | End: 2021-05-05

## 2021-05-05 RX ORDER — LIDOCAINE 40 MG/G
CREAM TOPICAL ONCE
Status: CANCELLED | OUTPATIENT
Start: 2021-05-05 | End: 2021-05-05

## 2021-05-05 RX ORDER — LIDOCAINE HYDROCHLORIDE 40 MG/ML
SOLUTION TOPICAL ONCE
Status: CANCELLED | OUTPATIENT
Start: 2021-05-05 | End: 2021-05-05

## 2021-05-05 RX ADMIN — LIDOCAINE HYDROCHLORIDE 6 ML: 20 JELLY TOPICAL at 13:48

## 2021-05-05 ASSESSMENT — PAIN DESCRIPTION - FREQUENCY: FREQUENCY: CONTINUOUS

## 2021-05-05 ASSESSMENT — PAIN DESCRIPTION - LOCATION: LOCATION: LEG

## 2021-05-05 ASSESSMENT — PAIN - FUNCTIONAL ASSESSMENT: PAIN_FUNCTIONAL_ASSESSMENT: PREVENTS OR INTERFERES SOME ACTIVE ACTIVITIES AND ADLS

## 2021-05-05 ASSESSMENT — PAIN DESCRIPTION - ONSET: ONSET: ON-GOING
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
## 2021-05-12 ENCOUNTER — HOSPITAL ENCOUNTER (OUTPATIENT)
Dept: WOUND CARE | Age: 66
Discharge: HOME | End: 2021-05-12
Payer: MEDICARE

## 2021-05-12 DIAGNOSIS — E44.0 MODERATE PROTEIN MALNUTRITION (HCC): ICD-10-CM

## 2021-05-12 DIAGNOSIS — Z89.511 HISTORY OF BELOW KNEE AMPUTATION, RIGHT (HCC): ICD-10-CM

## 2021-05-12 DIAGNOSIS — E11.42 TYPE 2 DIABETES MELLITUS WITH DIABETIC POLYNEUROPATHY, WITH LONG-TERM CURRENT USE OF INSULIN (HCC): ICD-10-CM

## 2021-05-12 DIAGNOSIS — I73.9 PAD (PERIPHERAL ARTERY DISEASE) (HCC): ICD-10-CM

## 2021-05-12 DIAGNOSIS — F17.200 TOBACCO DEPENDENCE: ICD-10-CM

## 2021-05-12 DIAGNOSIS — L97.922 LEG ULCER, LEFT, WITH FAT LAYER EXPOSED (HCC): ICD-10-CM

## 2021-05-12 DIAGNOSIS — Z89.512 HISTORY OF BELOW KNEE AMPUTATION, LEFT (HCC): Primary | ICD-10-CM

## 2021-05-12 DIAGNOSIS — Z79.4 TYPE 2 DIABETES MELLITUS WITH DIABETIC POLYNEUROPATHY, WITH LONG-TERM CURRENT USE OF INSULIN (HCC): ICD-10-CM

## 2021-05-12 PROCEDURE — 97605 NEG PRS WND THER DME<=50SQCM: CPT

## 2021-05-12 PROCEDURE — 11042 DBRDMT SUBQ TIS 1ST 20SQCM/<: CPT

## 2021-05-12 PROCEDURE — 11042 DBRDMT SUBQ TIS 1ST 20SQCM/<: CPT | Performed by: NURSE PRACTITIONER

## 2021-05-12 RX ORDER — LIDOCAINE HYDROCHLORIDE 20 MG/ML
JELLY TOPICAL ONCE
Status: COMPLETED | OUTPATIENT
Start: 2021-05-12 | End: 2021-05-12

## 2021-05-12 RX ORDER — LIDOCAINE 40 MG/G
CREAM TOPICAL ONCE
Status: CANCELLED | OUTPATIENT
Start: 2021-05-12 | End: 2021-05-12

## 2021-05-12 RX ORDER — LIDOCAINE HYDROCHLORIDE 20 MG/ML
JELLY TOPICAL ONCE
Status: CANCELLED | OUTPATIENT
Start: 2021-05-12 | End: 2021-05-12

## 2021-05-12 RX ORDER — LIDOCAINE HYDROCHLORIDE 40 MG/ML
SOLUTION TOPICAL ONCE
Status: CANCELLED | OUTPATIENT
Start: 2021-05-12 | End: 2021-05-12

## 2021-05-12 RX ORDER — LIDOCAINE 50 MG/G
OINTMENT TOPICAL ONCE
Status: CANCELLED | OUTPATIENT
Start: 2021-05-12 | End: 2021-05-12

## 2021-05-12 RX ADMIN — LIDOCAINE HYDROCHLORIDE 6 ML: 20 JELLY TOPICAL at 14:33
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
## 2021-05-12 NOTE — PROGRESS NOTES
Negative Pressure    NAME:  Sveta Trent  YOB: 1955  MEDICAL RECORD NUMBER:  [id number]  DATE:  5/12/2021     Applied Negative Pressure to Distal lateral wound(s)/ulcer(s).  [x] Applied skin barrier prep to mireya-wound.  [x] Cut strips of plastic drape to picture frame wound so that mireya-wound is     covered with the drape.  [x] If bridging dressing to less prominent site, cover any intact skin that will come in contact with the Negative Pressure Therapy sponge, gauze or channel drain with plastic drape. The sponge should never touch intact skin.  [x] Cut sponge, gauze or channel drain to size which will fit into the wound/ulcer bed without being forced.  [x] Be sure the sponge is large enough to hold the entire round plastic flange which is attached to the tubing. Never allow flange to be larger than the sponge or it will produce suction damaging intact skin.  Total number of individual pieces of foam used within the wound bed: 1     [x] If bridging the dressing away from the primary site, be sure the bridge leads to a piece of sponge large enough to hold the entire flange without allowing any of the flange to overlap onto intact skin.  [x] Covered sponge, gauze or channel drain with plastic drape.  [x] Cut a hole in this plastic drape directly over the sponge the same size as the plastic drain tubing.  [x] Removed plastic liner from flange and apply it directly over the hole you cut.  [x] Removed the plastic cover from the flange.  [x] Attached the tubing to the wound/ulcer Negative Pressure Therapy and turn it on to be sure a vacuum is created and that there are no leaks.  [x] If air leaks occur, use plastic drape to patch them.  [x] Secured Negative Pressure Therapy dressing with ace wrap loosely if located on an extremity. Maintain tubing outside of ace wrap. Tubing must not exert pressure on intact skin.     Applied per Ángel Dye Guidelines      Electronically signed by Daniel Milton RN on 5/12/2021 at 3:22 PM

## 2021-05-19 ENCOUNTER — HOSPITAL ENCOUNTER (OUTPATIENT)
Dept: WOUND CARE | Age: 66
Discharge: HOME | End: 2021-05-19
Payer: MEDICARE

## 2021-05-19 VITALS — DIASTOLIC BLOOD PRESSURE: 69 MMHG | HEART RATE: 108 BPM | SYSTOLIC BLOOD PRESSURE: 142 MMHG | TEMPERATURE: 98.4 F

## 2021-05-19 DIAGNOSIS — L97.922 LEG ULCER, LEFT, WITH FAT LAYER EXPOSED (HCC): ICD-10-CM

## 2021-05-19 DIAGNOSIS — M86.672 CHRONIC OSTEOMYELITIS INVOLVING LEFT ANKLE AND FOOT (HCC): ICD-10-CM

## 2021-05-19 DIAGNOSIS — Z89.512 HISTORY OF BELOW KNEE AMPUTATION, LEFT (HCC): Primary | ICD-10-CM

## 2021-05-19 DIAGNOSIS — I73.9 PAD (PERIPHERAL ARTERY DISEASE) (HCC): ICD-10-CM

## 2021-05-19 DIAGNOSIS — E44.0 MODERATE PROTEIN MALNUTRITION (HCC): ICD-10-CM

## 2021-05-19 DIAGNOSIS — Z79.4 TYPE 2 DIABETES MELLITUS WITH DIABETIC POLYNEUROPATHY, WITH LONG-TERM CURRENT USE OF INSULIN (HCC): ICD-10-CM

## 2021-05-19 DIAGNOSIS — F17.200 TOBACCO DEPENDENCE: ICD-10-CM

## 2021-05-19 DIAGNOSIS — Z89.511 HISTORY OF BELOW KNEE AMPUTATION, RIGHT (HCC): ICD-10-CM

## 2021-05-19 DIAGNOSIS — E11.42 TYPE 2 DIABETES MELLITUS WITH DIABETIC POLYNEUROPATHY, WITH LONG-TERM CURRENT USE OF INSULIN (HCC): ICD-10-CM

## 2021-05-19 PROCEDURE — 11042 DBRDMT SUBQ TIS 1ST 20SQCM/<: CPT | Performed by: NURSE PRACTITIONER

## 2021-05-19 PROCEDURE — 97605 NEG PRS WND THER DME<=50SQCM: CPT

## 2021-05-19 PROCEDURE — 11042 DBRDMT SUBQ TIS 1ST 20SQCM/<: CPT

## 2021-05-19 RX ORDER — LIDOCAINE 40 MG/G
CREAM TOPICAL ONCE
Status: CANCELLED | OUTPATIENT
Start: 2021-05-19 | End: 2021-05-19

## 2021-05-19 RX ORDER — LIDOCAINE HYDROCHLORIDE 40 MG/ML
SOLUTION TOPICAL ONCE
Status: CANCELLED | OUTPATIENT
Start: 2021-05-19 | End: 2021-05-19

## 2021-05-19 RX ORDER — LIDOCAINE HYDROCHLORIDE 20 MG/ML
JELLY TOPICAL ONCE
Status: CANCELLED | OUTPATIENT
Start: 2021-05-19 | End: 2021-05-19

## 2021-05-19 RX ORDER — LIDOCAINE 50 MG/G
OINTMENT TOPICAL ONCE
Status: CANCELLED | OUTPATIENT
Start: 2021-05-19 | End: 2021-05-19

## 2021-05-19 RX ORDER — LIDOCAINE HYDROCHLORIDE 20 MG/ML
JELLY TOPICAL ONCE
Status: COMPLETED | OUTPATIENT
Start: 2021-05-19 | End: 2021-05-19

## 2021-05-19 RX ADMIN — LIDOCAINE HYDROCHLORIDE: 20 JELLY TOPICAL at 14:48

## 2021-05-19 NOTE — PROGRESS NOTES
MG capsule Take 1 capsule by mouth daily 30 capsule 3    hydrOXYzine (VISTARIL) 25 MG capsule Take 25 mg by mouth 3 times daily as needed      glucose monitoring kit (FREESTYLE) monitoring kit 1 kit by Does not apply route daily 1 kit 0    Lancets MISC 1 each by Does not apply route 3 times daily 600 each 1    famotidine (PEPCID) 20 MG tablet Take 20 mg by mouth 2 times daily      ipratropium-albuterol (DUONEB) 0.5-2.5 (3) MG/3ML SOLN nebulizer solution Inhale 3 mLs into the lungs every 4 hours as needed for Shortness of Breath 360 mL 1    insulin glargine (LANTUS) 100 UNIT/ML injection vial Inject 25 Units into the skin Daily with supper 1 vial 3    lactobacillus (BACID) TABS Take 1 tablet by mouth 2 times daily 30 tablet 0    metFORMIN (GLUCOPHAGE) 500 MG tablet Take 1 tablet by mouth 2 times daily (with meals) 180 tablet 5     No current facility-administered medications on file prior to encounter. REVIEW OF SYSTEMS    Pertinent items are noted in HPI. Objective:      BP (!) 142/69   Pulse 108   Temp 98.4 °F (36.9 °C) (Tympanic)     Wt Readings from Last 3 Encounters:   05/03/21 153 lb (69.4 kg)   04/21/21 153 lb (69.4 kg)   04/12/21 153 lb 1.6 oz (69.4 kg)       PHYSICAL EXAM    General Appearance: alert and oriented to person, place and time, well-developed and well-nourished, in no acute distress  Skin: warm and dry, no rash or erythema, left leg ulcers   Head: normocephalic and atraumatic  Eyes: pupils equal, round, extraocular eye movements intact, and conjunctivae normal  Pulmonary/Chest: normal air movement, no respiratory distress  Extremities: no cyanosis and no clubbing or edema   Musculoskeletal: no joint swelling or tenderness.  Bilateral BKA  Neurologic: wheelchair bound, coordination normal and speech normal      Assessment:     Problem List Items Addressed This Visit     History of below knee amputation, left ([address] - Primary    Relevant Orders    Supply: Wound Cleanser    Supply: Wound Dressings    Supply: Cover and Secure    Neg. Pressure Wound Therapy    History of below knee amputation, right ([facility])    Relevant Orders    Supply: Wound Cleanser    Supply: Wound Dressings    Supply: Cover and Secure    Neg. Pressure Wound Therapy    Leg ulcer, left, with fat layer exposed [facility]    Relevant Orders    Supply: Wound Cleanser    Supply: Wound Dressings    Supply: Cover and Secure    Neg. Pressure Wound Therapy    Moderate protein malnutrition ([facility])    Relevant Orders    Supply: Wound Cleanser    Supply: Wound Dressings    Supply: Cover and Secure    Neg. Pressure Wound Therapy    Osteomyelitis, chronic, ankle or foot ([facility])    PAD (peripheral artery disease) ([facility])    Relevant Orders    Supply: Wound Cleanser    Supply: Wound Dressings    Supply: Cover and Secure    Neg. Pressure Wound Therapy    Tobacco dependence    Relevant Orders    Supply: Wound Cleanser    Supply: Wound Dressings    Supply: Cover and Secure    Neg. Pressure Wound Therapy    Type 2 diabetes mellitus with diabetic polyneuropathy, with long-term current use of insulin ([facility])    Relevant Orders    Supply: Wound Cleanser    Supply: Wound Dressings    Supply: Cover and Secure    Neg. Pressure Wound Therapy    Uncontrolled type 2 diabetes mellitus with foot ulcer ([facility]           Procedure Note  Indications:  Based on my examination of this patient's wound(s)/ulcer(s) today, debridement is required to promote healing and evaluate the wound base. Performed by: MARCELO Arreguin CNP    Consent obtained:  Yes    Time out taken:  Yes    Pain Control: Anesthetic  Anesthetic: 2% Lidocaine Gel Topical       Debridement:Excisional Debridement    Using curette the wound(s)/ulcer(s) was/were sharply debrided down through and including the removal of subcutaneous tissue.         Devitalized Tissue Debrided:  fibrin, biofilm and slough    Pre Debridement Measurements:  Are located in the [place]  Documentation Flow Sheet    Wound/Ulcer #: 1, 2 and 3    Post Debridement Measurements:  Wound/Ulcer Descriptions are Pre Debridement except measurements:    Negative Pressure Wound Therapy Leg Anterior; Left (Active)   Number of days: 43       Wound 04/21/21 Leg Left;Distal;Medial #1 (Active)   Wound Image   04/21/21 1331   Wound Etiology Non-Healing Surgical 05/19/21 1441   Dressing Status Old drainage noted;New drainage noted 05/19/21 1441   Wound Cleansed Cleansed with saline 05/19/21 1441   Wound Length (cm) 0.5 cm 05/19/21 1441   Wound Width (cm) 1.8 cm 05/19/21 1441   Wound Depth (cm) 0.1 cm 05/19/21 1441   Wound Surface Area (cm^2) 0.9 cm^2 05/19/21 1441   Change in Wound Size % (l*w) 65.91 05/19/21 1441   Wound Volume (cm^3) 0.09 cm^3 05/19/21 1441   Wound Healing % 92 05/19/21 1441   Post-Procedure Length (cm) 0.5 cm 05/19/21 1441   Post-Procedure Width (cm) 1.8 cm 05/19/21 1441   Post-Procedure Depth (cm) 0.1 cm 05/19/21 1441   Post-Procedure Surface Area (cm^2) 0.9 cm^2 05/19/21 1441   Post-Procedure Volume (cm^3) 0.09 cm^3 05/19/21 1441   Wound Assessment Slough;Pink/red 05/19/21 1441   Drainage Amount Moderate 05/19/21 1441   Drainage Description Serosanguinous 05/19/21 1441   Odor None 05/19/21 1441   Odalys-wound Assessment Fragile; Maceration 05/19/21 1441   Margins Defined edges 05/19/21 1441   Wound Thickness Description not for Pressure Injury Full thickness 05/19/21 1441   Number of days: 28       Wound 04/21/21 Leg Left;Distal;Lateral #2 (Active)   Wound Image   04/21/21 1331   Wound Etiology Non-Healing Surgical 05/19/21 1441   Dressing Status New drainage noted; Old drainage noted 05/19/21 1441   Wound Cleansed Cleansed with saline 05/19/21 1441   Wound Length (cm) 1.7 cm 05/19/21 1441   Wound Width (cm) 1.5 cm 05/19/21 1441   Wound Depth (cm) 0.5 cm 05/19/21 1441   Wound Surface Area (cm^2) 2.55 cm^2 05/19/21 1441   Change in Wound Size % (l*w) -5.37 05/19/21 1441   Wound Volume (cm^3) 1.27 cm^3 05/19/21 1441 Wound Healing % 52 05/19/21 1441   Post-Procedure Length (cm) 1.7 cm 05/19/21 1441   Post-Procedure Width (cm) 1.5 cm 05/19/21 1441   Post-Procedure Depth (cm) 0.5 cm 05/19/21 1441   Post-Procedure Surface Area (cm^2) 2.55 cm^2 05/19/21 1441   Post-Procedure Volume (cm^3) 1.27 cm^3 05/19/21 1441   Distance Tunneling (cm) 1.4 cm 05/12/21 1433   Tunneling Position ___ O'Clock 10:00 05/12/21 1433   Wound Assessment Pink/red;Slough;Granulation tissue 05/19/21 1441   Drainage Amount Moderate 05/19/21 1441   Drainage Description Serosanguinous 05/19/21 1441   Odor None 05/19/21 1441   Odalys-wound Assessment Maceration;Blanchable erythema 05/19/21 1441   Margins Defined edges 05/19/21 1441   Wound Thickness Description not for Pressure Injury Full thickness 05/19/21 1441   Number of days: 28       Wound 04/21/21 Leg Left;Proximal #3 (Active)   Wound Image   04/21/21 1331   Wound Etiology Other 05/19/21 1441   Dressing Status New drainage noted; Old drainage noted 05/19/21 1441   Wound Cleansed Cleansed with saline 05/19/21 1441   Wound Length (cm) 0.5 cm 05/19/21 1441   Wound Width (cm) 1 cm 05/19/21 1441   Wound Depth (cm) 0.1 cm 05/19/21 1441   Wound Surface Area (cm^2) 0.5 cm^2 05/19/21 1441   Change in Wound Size % (l*w) 10.71 05/19/21 1441   Wound Volume (cm^3) 0.05 cm^3 05/19/21 1441   Wound Healing % 17 05/19/21 1441   Post-Procedure Length (cm) 0.5 cm 05/19/21 1441   Post-Procedure Width (cm) 1 cm 05/19/21 1441   Post-Procedure Depth (cm) 0.1 cm 05/19/21 1441   Post-Procedure Surface Area (cm^2) 0.5 cm^2 05/19/21 1441   Post-Procedure Volume (cm^3) 0.05 cm^3 05/19/21 1441   Wound Assessment Slough 05/19/21 1441   Drainage Amount Moderate 05/19/21 1441   Drainage Description Yellow 05/19/21 1441   Odor None 05/19/21 1441   Odalys-wound Assessment Blanchable erythema 05/19/21 1441   Margins Defined edges 05/19/21 1441   Wound Thickness Description not for Pressure Injury Full thickness 05/19/21 1441   Number of days: unable to keep, kindly give a 24 hour notice. Thank you.)     If you experience any of the following, please call the [address] [place] [address] [address] during business hours:  [phone]  Your Phone call may be forwarded to [address] during business hours that [place] is closed.     * Increase in Pain  * Temperature over 101  * Increase in drainage from your wound  * Drainage with a foul odor  * Bleeding  * Increase in swelling  * Need for compression bandage changes due to slippage, breakthrough drainage.     If you need medical attention outside of the business hours of the [address] [place] [address] [address] please contact your PCP or go to the nearest emergency room.     The information contained in the After Visit Summary has been reviewed with me, the patient and/or responsible adult, by my health care provider(s). I had the opportunity to ask questions regarding this information. I have elected to receive;      []??? ? ? After Visit Summary  [x]??? ? ? Comprehensive Discharge Instruction        Patient signature______________________________________Date:________  Electronically signed by Reina Chaudhari RN on 5/19/2021 at 2:58 PM  Electronically signed by MARCELO Avendano CNP on 5/19/2021 at 3:03 PM          Electronically signed by MARCELO Avendano CNP on 5/19/2021 at 3:03 PM

## 2021-05-19 NOTE — PROGRESS NOTES
Negative Pressure    NAME:  Sherlyn Hutchison  YOB: 1955  MEDICAL RECORD NUMBER:  [id number]  DATE:  5/19/2021     Applied Negative Pressure to left knee wound(s)/ulcer(s).  [x] Applied skin barrier prep to mireya-wound.  [x] Cut strips of plastic drape to picture frame wound so that mireya-wound is     covered with the drape.  [x] If bridging dressing to less prominent site, cover any intact skin that will come in contact with the Negative Pressure Therapy sponge, gauze or channel drain with plastic drape. The sponge should never touch intact skin.  [x] Cut sponge, gauze or channel drain to size which will fit into the wound/ulcer bed without being forced.  [x] Be sure the sponge is large enough to hold the entire round plastic flange which is attached to the tubing. Never allow flange to be larger than the sponge or it will produce suction damaging intact skin.  Total number of individual pieces of foam used within the wound bed: 1     [x] If bridging the dressing away from the primary site, be sure the bridge leads to a piece of sponge large enough to hold the entire flange without allowing any of the flange to overlap onto intact skin.  [x] Covered sponge, gauze or channel drain with plastic drape.  [x] Cut a hole in this plastic drape directly over the sponge the same size as the plastic drain tubing.  [x] Removed plastic liner from flange and apply it directly over the hole you cut.  [x] Removed the plastic cover from the flange.  [x] Attached the tubing to the wound/ulcer Negative Pressure Therapy and turn it on to be sure a vacuum is created and that there are no leaks.  [x] If air leaks occur, use plastic drape to patch them.  [x] Secured Negative Pressure Therapy dressing with ace wrap loosely if located on an extremity. Maintain tubing outside of ace wrap. Tubing must not exert pressure on intact skin.     Applied per Ángel Dye

## 2021-05-24 ENCOUNTER — HOSPITAL ENCOUNTER (OUTPATIENT)
Dept: WOUND CARE | Age: 66
Discharge: HOME | DRG: 167 | End: 2021-05-24
Payer: MEDICARE

## 2021-05-24 VITALS
HEART RATE: 109 BPM | RESPIRATION RATE: 18 BRPM | SYSTOLIC BLOOD PRESSURE: 133 MMHG | DIASTOLIC BLOOD PRESSURE: 68 MMHG | TEMPERATURE: 98 F

## 2021-05-24 DIAGNOSIS — E11.42 TYPE 2 DIABETES MELLITUS WITH DIABETIC POLYNEUROPATHY, WITH LONG-TERM CURRENT USE OF INSULIN (HCC): ICD-10-CM

## 2021-05-24 DIAGNOSIS — F17.200 TOBACCO DEPENDENCE: ICD-10-CM

## 2021-05-24 DIAGNOSIS — L97.922 LEG ULCER, LEFT, WITH FAT LAYER EXPOSED (HCC): ICD-10-CM

## 2021-05-24 DIAGNOSIS — Z89.511 HISTORY OF BELOW KNEE AMPUTATION, RIGHT (HCC): ICD-10-CM

## 2021-05-24 DIAGNOSIS — I73.9 PAD (PERIPHERAL ARTERY DISEASE) (HCC): ICD-10-CM

## 2021-05-24 DIAGNOSIS — E44.0 MODERATE PROTEIN MALNUTRITION (HCC): ICD-10-CM

## 2021-05-24 DIAGNOSIS — Z79.4 TYPE 2 DIABETES MELLITUS WITH DIABETIC POLYNEUROPATHY, WITH LONG-TERM CURRENT USE OF INSULIN (HCC): ICD-10-CM

## 2021-05-24 DIAGNOSIS — Z89.512 HISTORY OF BELOW KNEE AMPUTATION, LEFT (HCC): Primary | ICD-10-CM

## 2021-05-24 PROCEDURE — 0JBP0ZZ EXCISION OF LEFT LOWER LEG SUBCUTANEOUS TISSUE AND FASCIA, OPEN APPROACH: ICD-10-PCS | Performed by: NURSE PRACTITIONER

## 2021-05-24 PROCEDURE — 11042 DBRDMT SUBQ TIS 1ST 20SQCM/<: CPT | Performed by: NURSE PRACTITIONER

## 2021-05-24 PROCEDURE — 11042 DBRDMT SUBQ TIS 1ST 20SQCM/<: CPT

## 2021-05-24 RX ORDER — LIDOCAINE 50 MG/G
OINTMENT TOPICAL ONCE
Status: CANCELLED | OUTPATIENT
Start: 2021-05-24 | End: 2021-05-24

## 2021-05-24 RX ORDER — LIDOCAINE 40 MG/G
CREAM TOPICAL ONCE
Status: CANCELLED | OUTPATIENT
Start: 2021-05-24 | End: 2021-05-24

## 2021-05-24 RX ORDER — LIDOCAINE HYDROCHLORIDE 20 MG/ML
JELLY TOPICAL ONCE
Status: CANCELLED | OUTPATIENT
Start: 2021-05-24 | End: 2021-05-24

## 2021-05-24 RX ORDER — LIDOCAINE HYDROCHLORIDE 20 MG/ML
JELLY TOPICAL ONCE
Status: DISCONTINUED | OUTPATIENT
Start: 2021-05-24 | End: 2021-05-25 | Stop reason: HOSPADM

## 2021-05-24 RX ORDER — LIDOCAINE HYDROCHLORIDE 20 MG/ML
JELLY TOPICAL ONCE
Status: DISCONTINUED | OUTPATIENT
Start: 2021-05-24 | End: 2021-05-29 | Stop reason: HOSPADM

## 2021-05-24 RX ORDER — LIDOCAINE HYDROCHLORIDE 40 MG/ML
SOLUTION TOPICAL ONCE
Status: CANCELLED | OUTPATIENT
Start: 2021-05-24 | End: 2021-05-24

## 2021-05-24 ASSESSMENT — PAIN SCALES - GENERAL: PAINLEVEL_OUTOF10: 0
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
## 2021-05-25 ENCOUNTER — APPOINTMENT (OUTPATIENT)
Dept: GENERAL RADIOLOGY | Age: 66
DRG: 167 | End: 2021-05-25
Payer: MEDICARE

## 2021-05-25 ENCOUNTER — HOSPITAL ENCOUNTER (INPATIENT)
Age: 66
LOS: 3 days | Discharge: NURSING, REHAB OR LONG-TERM CARE | DRG: 167 | End: 2021-05-28
Attending: EMERGENCY MEDICINE | Admitting: INTERNAL MEDICINE
Payer: MEDICARE

## 2021-05-25 DIAGNOSIS — F51.01 PRIMARY INSOMNIA: ICD-10-CM

## 2021-05-25 DIAGNOSIS — J90 PLEURAL EFFUSION: ICD-10-CM

## 2021-05-25 DIAGNOSIS — J18.9 PNEUMONIA DUE TO ORGANISM: Primary | ICD-10-CM

## 2021-05-25 DIAGNOSIS — M86.672 CHRONIC REFRACTORY OSTEOMYELITIS OF LEFT FOOT (HCC): ICD-10-CM

## 2021-05-25 PROBLEM — Z89.512 S/P BKA (BELOW KNEE AMPUTATION) BILATERAL (HCC): Status: ACTIVE | Noted: 2020-02-24

## 2021-05-25 PROBLEM — J91.8 PLEURAL EFFUSION ASSOCIATED WITH PULMONARY INFECTION: Status: ACTIVE | Noted: 2021-05-25

## 2021-05-25 PROBLEM — Z89.511 S/P BKA (BELOW KNEE AMPUTATION) BILATERAL (HCC): Status: ACTIVE | Noted: 2020-02-24

## 2021-05-25 PROBLEM — R10.9 ABDOMINAL PAIN: Status: ACTIVE | Noted: 2021-05-25

## 2021-05-25 PROBLEM — F12.10 MARIJUANA ABUSE: Status: ACTIVE | Noted: 2021-05-25

## 2021-05-25 LAB
ABSOLUTE EOS #: 0.21 K/UL (ref 0–0.44)
ABSOLUTE IMMATURE GRANULOCYTE: 0.11 K/UL (ref 0–0.3)
ABSOLUTE LYMPH #: 1.39 K/UL (ref 1.1–3.7)
ABSOLUTE MONO #: 1.18 K/UL (ref 0.1–1.2)
ALBUMIN SERPL-MCNC: 3.3 G/DL (ref 3.5–5.2)
ALBUMIN/GLOBULIN RATIO: ABNORMAL (ref 1–2.5)
ALP BLD-CCNC: 170 U/L (ref 40–129)
ALT SERPL-CCNC: 16 U/L (ref 5–41)
ANION GAP SERPL CALCULATED.3IONS-SCNC: 10 MMOL/L (ref 9–17)
AST SERPL-CCNC: 11 U/L
BASOPHILS # BLD: 1 % (ref 0–2)
BASOPHILS ABSOLUTE: 0.11 K/UL (ref 0–0.2)
BILIRUB SERPL-MCNC: <0.1 MG/DL (ref 0.3–1.2)
BILIRUBIN DIRECT: <0.08 MG/DL
BILIRUBIN, INDIRECT: ABNORMAL MG/DL (ref 0–1)
BNP INTERPRETATION: ABNORMAL
BUN BLDV-MCNC: 22 MG/DL (ref 8–23)
BUN/CREAT BLD: 27 (ref 9–20)
CALCIUM SERPL-MCNC: 8.9 MG/DL (ref 8.6–10.4)
CHLORIDE BLD-SCNC: 98 MMOL/L (ref 98–107)
CO2: 28 MMOL/L (ref 20–31)
CREAT SERPL-MCNC: 0.81 MG/DL (ref 0.7–1.2)
DIFFERENTIAL TYPE: ABNORMAL
EKG ATRIAL RATE: 108 BPM
EKG P AXIS: 49 DEGREES
EKG P-R INTERVAL: 138 MS
EKG Q-T INTERVAL: 342 MS
EKG QRS DURATION: 80 MS
EKG QTC CALCULATION (BAZETT): 458 MS
EKG R AXIS: 29 DEGREES
EKG T AXIS: 53 DEGREES
EKG VENTRICULAR RATE: 108 BPM
EOSINOPHILS RELATIVE PERCENT: 2 % (ref 1–4)
FOLATE: 10.2 NG/ML
GFR AFRICAN AMERICAN: >60 ML/MIN
GFR NON-AFRICAN AMERICAN: >60 ML/MIN
GFR SERPL CREATININE-BSD FRML MDRD: ABNORMAL ML/MIN/{1.73_M2}
GFR SERPL CREATININE-BSD FRML MDRD: ABNORMAL ML/MIN/{1.73_M2}
GLOBULIN: ABNORMAL G/DL (ref 1.5–3.8)
GLUCOSE BLD-MCNC: 135 MG/DL (ref 75–110)
GLUCOSE BLD-MCNC: 147 MG/DL (ref 75–110)
GLUCOSE BLD-MCNC: 230 MG/DL (ref 70–99)
GLUCOSE BLD-MCNC: 241 MG/DL (ref 75–110)
HCT VFR BLD CALC: 28.2 % (ref 40.7–50.3)
HEMOGLOBIN: 7.9 G/DL (ref 13–17)
IMMATURE GRANULOCYTES: 1 %
LACTIC ACID: 2.1 MMOL/L (ref 0.5–2.2)
LV EF: 55 %
LVEF MODALITY: NORMAL
LYMPHOCYTES # BLD: 13 % (ref 24–43)
MCH RBC QN AUTO: 23.2 PG (ref 25.2–33.5)
MCHC RBC AUTO-ENTMCNC: 28 G/DL (ref 28.4–34.8)
MCV RBC AUTO: 82.9 FL (ref 82.6–102.9)
MONOCYTES # BLD: 11 % (ref 3–12)
MORPHOLOGY: ABNORMAL
MYOGLOBIN: 42 NG/ML (ref 28–72)
NRBC AUTOMATED: 0 PER 100 WBC
PDW BLD-RTO: 17.6 % (ref 11.8–14.4)
PLATELET # BLD: 464 K/UL (ref 138–453)
PLATELET ESTIMATE: ABNORMAL
PMV BLD AUTO: 9.1 FL (ref 8.1–13.5)
POTASSIUM SERPL-SCNC: 4.2 MMOL/L (ref 3.7–5.3)
PRO-BNP: 443 PG/ML
PROCALCITONIN: 0.11 NG/ML
PROCALCITONIN: 0.13 NG/ML
RBC # BLD: 3.4 M/UL (ref 4.21–5.77)
RBC # BLD: ABNORMAL 10*6/UL
SEG NEUTROPHILS: 72 % (ref 36–65)
SEGMENTED NEUTROPHILS ABSOLUTE COUNT: 7.7 K/UL (ref 1.5–8.1)
SODIUM BLD-SCNC: 136 MMOL/L (ref 135–144)
TOTAL PROTEIN: 6.6 G/DL (ref 6.4–8.3)
TROPONIN INTERP: ABNORMAL
TROPONIN INTERP: ABNORMAL
TROPONIN T: ABNORMAL NG/ML
TROPONIN T: ABNORMAL NG/ML
TROPONIN, HIGH SENSITIVITY: 23 NG/L (ref 0–22)
TROPONIN, HIGH SENSITIVITY: 24 NG/L (ref 0–22)
VITAMIN B-12: 384 PG/ML (ref 232–1245)
WBC # BLD: 10.7 K/UL (ref 3.5–11.3)
WBC # BLD: ABNORMAL 10*3/UL

## 2021-05-25 PROCEDURE — 96365 THER/PROPH/DIAG IV INF INIT: CPT

## 2021-05-25 PROCEDURE — 99284 EMERGENCY DEPT VISIT MOD MDM: CPT

## 2021-05-25 PROCEDURE — 83550 IRON BINDING TEST: CPT

## 2021-05-25 PROCEDURE — 1200000000 HC SEMI PRIVATE

## 2021-05-25 PROCEDURE — 6360000002 HC RX W HCPCS: Performed by: NURSE PRACTITIONER

## 2021-05-25 PROCEDURE — 6370000000 HC RX 637 (ALT 250 FOR IP): Performed by: NURSE PRACTITIONER

## 2021-05-25 PROCEDURE — 96375 TX/PRO/DX INJ NEW DRUG ADDON: CPT

## 2021-05-25 PROCEDURE — 83540 ASSAY OF IRON: CPT

## 2021-05-25 PROCEDURE — 2700000000 HC OXYGEN THERAPY PER DAY

## 2021-05-25 PROCEDURE — 87040 BLOOD CULTURE FOR BACTERIA: CPT

## 2021-05-25 PROCEDURE — 2580000003 HC RX 258: Performed by: NURSE PRACTITIONER

## 2021-05-25 PROCEDURE — 93010 ELECTROCARDIOGRAM REPORT: CPT | Performed by: INTERNAL MEDICINE

## 2021-05-25 PROCEDURE — 83605 ASSAY OF LACTIC ACID: CPT

## 2021-05-25 PROCEDURE — C9113 INJ PANTOPRAZOLE SODIUM, VIA: HCPCS | Performed by: NURSE PRACTITIONER

## 2021-05-25 PROCEDURE — 82947 ASSAY GLUCOSE BLOOD QUANT: CPT

## 2021-05-25 PROCEDURE — 84145 PROCALCITONIN (PCT): CPT

## 2021-05-25 PROCEDURE — 82607 VITAMIN B-12: CPT

## 2021-05-25 PROCEDURE — 99223 1ST HOSP IP/OBS HIGH 75: CPT | Performed by: INTERNAL MEDICINE

## 2021-05-25 PROCEDURE — 85025 COMPLETE CBC W/AUTO DIFF WBC: CPT

## 2021-05-25 PROCEDURE — APPSS45 APP SPLIT SHARED TIME 31-45 MINUTES: Performed by: NURSE PRACTITIONER

## 2021-05-25 PROCEDURE — 93005 ELECTROCARDIOGRAM TRACING: CPT | Performed by: NURSE PRACTITIONER

## 2021-05-25 PROCEDURE — 83874 ASSAY OF MYOGLOBIN: CPT

## 2021-05-25 PROCEDURE — 82746 ASSAY OF FOLIC ACID SERUM: CPT

## 2021-05-25 PROCEDURE — 83880 ASSAY OF NATRIURETIC PEPTIDE: CPT

## 2021-05-25 PROCEDURE — 6370000000 HC RX 637 (ALT 250 FOR IP): Performed by: INTERNAL MEDICINE

## 2021-05-25 PROCEDURE — 80048 BASIC METABOLIC PNL TOTAL CA: CPT

## 2021-05-25 PROCEDURE — 94640 AIRWAY INHALATION TREATMENT: CPT

## 2021-05-25 PROCEDURE — 84484 ASSAY OF TROPONIN QUANT: CPT

## 2021-05-25 PROCEDURE — APPNB60 APP NON BILLABLE TIME 46-60 MINS: Performed by: NURSE PRACTITIONER

## 2021-05-25 PROCEDURE — 93306 TTE W/DOPPLER COMPLETE: CPT

## 2021-05-25 PROCEDURE — 74022 RADEX COMPL AQT ABD SERIES: CPT

## 2021-05-25 PROCEDURE — 80076 HEPATIC FUNCTION PANEL: CPT

## 2021-05-25 RX ORDER — MORPHINE SULFATE 2 MG/ML
2 INJECTION, SOLUTION INTRAMUSCULAR; INTRAVENOUS
Status: DISCONTINUED | OUTPATIENT
Start: 2021-05-25 | End: 2021-05-28 | Stop reason: HOSPADM

## 2021-05-25 RX ORDER — SIMETHICONE 80 MG
80 TABLET,CHEWABLE ORAL EVERY 6 HOURS PRN
Status: DISCONTINUED | OUTPATIENT
Start: 2021-05-25 | End: 2021-05-25

## 2021-05-25 RX ORDER — SIMETHICONE 80 MG
80 TABLET,CHEWABLE ORAL EVERY 6 HOURS PRN
Status: DISCONTINUED | OUTPATIENT
Start: 2021-05-25 | End: 2021-05-28 | Stop reason: HOSPADM

## 2021-05-25 RX ORDER — FUROSEMIDE 10 MG/ML
20 INJECTION INTRAMUSCULAR; INTRAVENOUS ONCE
Status: COMPLETED | OUTPATIENT
Start: 2021-05-25 | End: 2021-05-25

## 2021-05-25 RX ORDER — CALCIUM CARBONATE 200(500)MG
500 TABLET,CHEWABLE ORAL 3 TIMES DAILY PRN
Status: DISCONTINUED | OUTPATIENT
Start: 2021-05-25 | End: 2021-05-25

## 2021-05-25 RX ORDER — INSULIN GLARGINE 100 [IU]/ML
25 INJECTION, SOLUTION SUBCUTANEOUS
Status: DISCONTINUED | OUTPATIENT
Start: 2021-05-25 | End: 2021-05-28 | Stop reason: HOSPADM

## 2021-05-25 RX ORDER — ZOLPIDEM TARTRATE 5 MG/1
5 TABLET ORAL NIGHTLY PRN
Status: DISCONTINUED | OUTPATIENT
Start: 2021-05-25 | End: 2021-05-28 | Stop reason: HOSPADM

## 2021-05-25 RX ORDER — NICOTINE 21 MG/24HR
1 PATCH, TRANSDERMAL 24 HOURS TRANSDERMAL DAILY PRN
Status: DISCONTINUED | OUTPATIENT
Start: 2021-05-25 | End: 2021-05-28 | Stop reason: HOSPADM

## 2021-05-25 RX ORDER — SODIUM CHLORIDE 0.9 % (FLUSH) 0.9 %
10 SYRINGE (ML) INJECTION PRN
Status: DISCONTINUED | OUTPATIENT
Start: 2021-05-25 | End: 2021-05-28 | Stop reason: HOSPADM

## 2021-05-25 RX ORDER — PANTOPRAZOLE SODIUM 40 MG/10ML
40 INJECTION, POWDER, LYOPHILIZED, FOR SOLUTION INTRAVENOUS ONCE
Status: COMPLETED | OUTPATIENT
Start: 2021-05-25 | End: 2021-05-25

## 2021-05-25 RX ORDER — FAMOTIDINE 20 MG/1
20 TABLET, FILM COATED ORAL ONCE
Status: COMPLETED | OUTPATIENT
Start: 2021-05-25 | End: 2021-05-25

## 2021-05-25 RX ORDER — HYDROXYZINE HYDROCHLORIDE 25 MG/1
25 TABLET, FILM COATED ORAL 3 TIMES DAILY PRN
Status: DISCONTINUED | OUTPATIENT
Start: 2021-05-25 | End: 2021-05-28 | Stop reason: HOSPADM

## 2021-05-25 RX ORDER — ALBUTEROL SULFATE 90 UG/1
2 AEROSOL, METERED RESPIRATORY (INHALATION) EVERY 6 HOURS PRN
Status: DISCONTINUED | OUTPATIENT
Start: 2021-05-25 | End: 2021-05-28 | Stop reason: HOSPADM

## 2021-05-25 RX ORDER — TAMSULOSIN HYDROCHLORIDE 0.4 MG/1
0.4 CAPSULE ORAL DAILY
Status: DISCONTINUED | OUTPATIENT
Start: 2021-05-25 | End: 2021-05-28 | Stop reason: HOSPADM

## 2021-05-25 RX ORDER — POTASSIUM CHLORIDE 20 MEQ/1
40 TABLET, EXTENDED RELEASE ORAL PRN
Status: DISCONTINUED | OUTPATIENT
Start: 2021-05-25 | End: 2021-05-28 | Stop reason: HOSPADM

## 2021-05-25 RX ORDER — MAGNESIUM HYDROXIDE/ALUMINUM HYDROXICE/SIMETHICONE 120; 1200; 1200 MG/30ML; MG/30ML; MG/30ML
30 SUSPENSION ORAL EVERY 6 HOURS PRN
Status: DISCONTINUED | OUTPATIENT
Start: 2021-05-25 | End: 2021-05-28 | Stop reason: HOSPADM

## 2021-05-25 RX ORDER — POLYETHYLENE GLYCOL 3350 17 G/17G
17 POWDER, FOR SOLUTION ORAL DAILY PRN
Status: DISCONTINUED | OUTPATIENT
Start: 2021-05-25 | End: 2021-05-28 | Stop reason: HOSPADM

## 2021-05-25 RX ORDER — FUROSEMIDE 10 MG/ML
20 INJECTION INTRAMUSCULAR; INTRAVENOUS 2 TIMES DAILY
Status: DISCONTINUED | OUTPATIENT
Start: 2021-05-25 | End: 2021-05-28 | Stop reason: HOSPADM

## 2021-05-25 RX ORDER — MAGNESIUM SULFATE 1 G/100ML
1000 INJECTION INTRAVENOUS PRN
Status: DISCONTINUED | OUTPATIENT
Start: 2021-05-25 | End: 2021-05-28 | Stop reason: HOSPADM

## 2021-05-25 RX ORDER — MORPHINE SULFATE 4 MG/ML
4 INJECTION, SOLUTION INTRAMUSCULAR; INTRAVENOUS
Status: DISCONTINUED | OUTPATIENT
Start: 2021-05-25 | End: 2021-05-28 | Stop reason: HOSPADM

## 2021-05-25 RX ORDER — OXYCODONE HYDROCHLORIDE AND ACETAMINOPHEN 5; 325 MG/1; MG/1
1 TABLET ORAL EVERY 4 HOURS PRN
Status: ON HOLD | COMMUNITY
End: 2021-05-27 | Stop reason: SDUPTHER

## 2021-05-25 RX ORDER — ONDANSETRON 4 MG/1
4 TABLET, FILM COATED ORAL EVERY 8 HOURS PRN
COMMUNITY
End: 2021-10-05 | Stop reason: SDUPTHER

## 2021-05-25 RX ORDER — IPRATROPIUM BROMIDE AND ALBUTEROL SULFATE 2.5; .5 MG/3ML; MG/3ML
3 SOLUTION RESPIRATORY (INHALATION) EVERY 4 HOURS PRN
Status: DISCONTINUED | OUTPATIENT
Start: 2021-05-25 | End: 2021-05-28 | Stop reason: HOSPADM

## 2021-05-25 RX ORDER — POTASSIUM CHLORIDE 7.45 MG/ML
10 INJECTION INTRAVENOUS PRN
Status: DISCONTINUED | OUTPATIENT
Start: 2021-05-25 | End: 2021-05-28 | Stop reason: HOSPADM

## 2021-05-25 RX ORDER — SODIUM CHLORIDE 9 MG/ML
25 INJECTION, SOLUTION INTRAVENOUS PRN
Status: DISCONTINUED | OUTPATIENT
Start: 2021-05-25 | End: 2021-05-28 | Stop reason: HOSPADM

## 2021-05-25 RX ORDER — M-VIT,TX,IRON,MINS/CALC/FOLIC 27MG-0.4MG
1 TABLET ORAL DAILY
Status: ON HOLD | COMMUNITY
End: 2022-09-08

## 2021-05-25 RX ORDER — SODIUM CHLORIDE 0.9 % (FLUSH) 0.9 %
5-40 SYRINGE (ML) INJECTION EVERY 12 HOURS SCHEDULED
Status: DISCONTINUED | OUTPATIENT
Start: 2021-05-25 | End: 2021-05-28 | Stop reason: HOSPADM

## 2021-05-25 RX ORDER — ONDANSETRON 2 MG/ML
4 INJECTION INTRAMUSCULAR; INTRAVENOUS EVERY 6 HOURS PRN
Status: DISCONTINUED | OUTPATIENT
Start: 2021-05-25 | End: 2021-05-28 | Stop reason: HOSPADM

## 2021-05-25 RX ORDER — OXYCODONE HYDROCHLORIDE AND ACETAMINOPHEN 5; 325 MG/1; MG/1
1 TABLET ORAL EVERY 4 HOURS PRN
Status: DISCONTINUED | OUTPATIENT
Start: 2021-05-25 | End: 2021-05-28 | Stop reason: HOSPADM

## 2021-05-25 RX ORDER — SENNA PLUS 8.6 MG/1
2 TABLET ORAL 2 TIMES DAILY PRN
COMMUNITY
End: 2021-09-17

## 2021-05-25 RX ORDER — LEVOFLOXACIN 500 MG/1
750 TABLET, FILM COATED ORAL DAILY
Status: DISCONTINUED | OUTPATIENT
Start: 2021-05-25 | End: 2021-05-28 | Stop reason: HOSPADM

## 2021-05-25 RX ORDER — PROMETHAZINE HYDROCHLORIDE 12.5 MG/1
12.5 TABLET ORAL EVERY 6 HOURS PRN
Status: DISCONTINUED | OUTPATIENT
Start: 2021-05-25 | End: 2021-05-28 | Stop reason: HOSPADM

## 2021-05-25 RX ORDER — MAGNESIUM HYDROXIDE/ALUMINUM HYDROXICE/SIMETHICONE 120; 1200; 1200 MG/30ML; MG/30ML; MG/30ML
10 SUSPENSION ORAL EVERY 6 HOURS PRN
Status: ON HOLD | COMMUNITY
End: 2022-02-21

## 2021-05-25 RX ORDER — ACETAMINOPHEN 325 MG/1
650 TABLET ORAL EVERY 6 HOURS PRN
Status: DISCONTINUED | OUTPATIENT
Start: 2021-05-25 | End: 2021-05-28 | Stop reason: HOSPADM

## 2021-05-25 RX ORDER — ACETAMINOPHEN 650 MG/1
650 SUPPOSITORY RECTAL EVERY 6 HOURS PRN
Status: DISCONTINUED | OUTPATIENT
Start: 2021-05-25 | End: 2021-05-28 | Stop reason: HOSPADM

## 2021-05-25 RX ORDER — OXYCODONE HYDROCHLORIDE AND ACETAMINOPHEN 5; 325 MG/1; MG/1
1 TABLET ORAL ONCE
Status: COMPLETED | OUTPATIENT
Start: 2021-05-25 | End: 2021-05-25

## 2021-05-25 RX ORDER — POLYETHYLENE GLYCOL 3350 17 G/17G
17 POWDER, FOR SOLUTION ORAL DAILY PRN
COMMUNITY
End: 2021-09-17

## 2021-05-25 RX ORDER — BUDESONIDE AND FORMOTEROL FUMARATE DIHYDRATE 160; 4.5 UG/1; UG/1
2 AEROSOL RESPIRATORY (INHALATION) 2 TIMES DAILY
Status: DISCONTINUED | OUTPATIENT
Start: 2021-05-25 | End: 2021-05-28 | Stop reason: HOSPADM

## 2021-05-25 RX ORDER — AMITRIPTYLINE HYDROCHLORIDE 50 MG/1
75 TABLET, FILM COATED ORAL NIGHTLY
Status: DISCONTINUED | OUTPATIENT
Start: 2021-05-25 | End: 2021-05-28 | Stop reason: HOSPADM

## 2021-05-25 RX ADMIN — LEVOFLOXACIN 750 MG: 500 TABLET, FILM COATED ORAL at 16:44

## 2021-05-25 RX ADMIN — OXYCODONE AND ACETAMINOPHEN 1 TABLET: 5; 325 TABLET ORAL at 22:24

## 2021-05-25 RX ADMIN — FAMOTIDINE 20 MG: 20 TABLET, FILM COATED ORAL at 22:52

## 2021-05-25 RX ADMIN — APIXABAN 5 MG: 5 TABLET, FILM COATED ORAL at 22:24

## 2021-05-25 RX ADMIN — FUROSEMIDE 20 MG: 10 INJECTION, SOLUTION INTRAMUSCULAR; INTRAVENOUS at 14:06

## 2021-05-25 RX ADMIN — AZITHROMYCIN MONOHYDRATE 500 MG: 500 INJECTION, POWDER, LYOPHILIZED, FOR SOLUTION INTRAVENOUS at 12:16

## 2021-05-25 RX ADMIN — PROBIOTIC PRODUCT - TAB 1 TABLET: TAB at 22:24

## 2021-05-25 RX ADMIN — SIMETHICONE 80 MG: 80 TABLET, CHEWABLE ORAL at 23:43

## 2021-05-25 RX ADMIN — OXYCODONE AND ACETAMINOPHEN 1 TABLET: 5; 325 TABLET ORAL at 16:44

## 2021-05-25 RX ADMIN — OXYCODONE AND ACETAMINOPHEN 1 TABLET: 5; 325 TABLET ORAL at 10:46

## 2021-05-25 RX ADMIN — INSULIN GLARGINE 25 UNITS: 100 INJECTION, SOLUTION SUBCUTANEOUS at 17:08

## 2021-05-25 RX ADMIN — SODIUM CHLORIDE, PRESERVATIVE FREE 10 ML: 5 INJECTION INTRAVENOUS at 16:01

## 2021-05-25 RX ADMIN — INSULIN LISPRO 4 UNITS: 100 INJECTION, SOLUTION INTRAVENOUS; SUBCUTANEOUS at 13:18

## 2021-05-25 RX ADMIN — PANTOPRAZOLE SODIUM 40 MG: 40 INJECTION, POWDER, FOR SOLUTION INTRAVENOUS at 10:46

## 2021-05-25 RX ADMIN — SIMETHICONE 80 MG: 80 TABLET, CHEWABLE ORAL at 17:36

## 2021-05-25 RX ADMIN — BUDESONIDE AND FORMOTEROL FUMARATE DIHYDRATE 2 PUFF: 160; 4.5 AEROSOL RESPIRATORY (INHALATION) at 21:26

## 2021-05-25 RX ADMIN — ZOLPIDEM TARTRATE 5 MG: 5 TABLET ORAL at 23:41

## 2021-05-25 RX ADMIN — MORPHINE SULFATE 4 MG: 4 INJECTION, SOLUTION INTRAMUSCULAR; INTRAVENOUS at 19:59

## 2021-05-25 RX ADMIN — AMITRIPTYLINE HYDROCHLORIDE 75 MG: 50 TABLET, FILM COATED ORAL at 22:24

## 2021-05-25 RX ADMIN — MORPHINE SULFATE 4 MG: 4 INJECTION, SOLUTION INTRAMUSCULAR; INTRAVENOUS at 23:41

## 2021-05-25 RX ADMIN — TAMSULOSIN HYDROCHLORIDE 0.4 MG: 0.4 CAPSULE ORAL at 16:44

## 2021-05-25 RX ADMIN — METFORMIN HYDROCHLORIDE 500 MG: 500 TABLET ORAL at 17:08

## 2021-05-25 RX ADMIN — SODIUM CHLORIDE, PRESERVATIVE FREE 10 ML: 5 INJECTION INTRAVENOUS at 22:24

## 2021-05-25 RX ADMIN — INSULIN LISPRO 2 UNITS: 100 INJECTION, SOLUTION INTRAVENOUS; SUBCUTANEOUS at 17:08

## 2021-05-25 RX ADMIN — ALUMINUM HYDROXIDE, MAGNESIUM HYDROXIDE, AND SIMETHICONE 30 ML: 200; 200; 20 SUSPENSION ORAL at 23:47

## 2021-05-25 RX ADMIN — ONDANSETRON 4 MG: 2 INJECTION INTRAMUSCULAR; INTRAVENOUS at 17:12

## 2021-05-25 RX ADMIN — CEFTRIAXONE SODIUM 1000 MG: 1 INJECTION, POWDER, FOR SOLUTION INTRAMUSCULAR; INTRAVENOUS at 11:36

## 2021-05-25 ASSESSMENT — PAIN DESCRIPTION - LOCATION
LOCATION: LEG

## 2021-05-25 ASSESSMENT — ENCOUNTER SYMPTOMS
DIARRHEA: 0
RHINORRHEA: 0
COUGH: 0
SINUS PAIN: 0
NAUSEA: 0
COLOR CHANGE: 0
SORE THROAT: 0
PHOTOPHOBIA: 0
VOMITING: 0
WHEEZING: 0
ABDOMINAL PAIN: 0
CONSTIPATION: 0
SHORTNESS OF BREATH: 1
NAUSEA: 1
ABDOMINAL DISTENTION: 1
SINUS PRESSURE: 0
DIARRHEA: 1
ABDOMINAL PAIN: 1

## 2021-05-25 ASSESSMENT — PAIN DESCRIPTION - ONSET
ONSET: ON-GOING

## 2021-05-25 ASSESSMENT — PAIN DESCRIPTION - DESCRIPTORS
DESCRIPTORS: STABBING;ACHING

## 2021-05-25 ASSESSMENT — PAIN SCALES - GENERAL
PAINLEVEL_OUTOF10: 8
PAINLEVEL_OUTOF10: 6
PAINLEVEL_OUTOF10: 8
PAINLEVEL_OUTOF10: 7

## 2021-05-25 ASSESSMENT — PAIN DESCRIPTION - ORIENTATION
ORIENTATION: LEFT

## 2021-05-25 ASSESSMENT — PAIN DESCRIPTION - FREQUENCY
FREQUENCY: CONTINUOUS

## 2021-05-25 ASSESSMENT — PAIN DESCRIPTION - PAIN TYPE
TYPE: CHRONIC PAIN;PHANTOM PAIN

## 2021-05-25 ASSESSMENT — PAIN - FUNCTIONAL ASSESSMENT
PAIN_FUNCTIONAL_ASSESSMENT: PREVENTS OR INTERFERES SOME ACTIVE ACTIVITIES AND ADLS

## 2021-05-25 ASSESSMENT — PAIN DESCRIPTION - PROGRESSION
CLINICAL_PROGRESSION: NOT CHANGED

## 2021-05-25 NOTE — CARE COORDINATION
Case Management Initial Discharge Plan  Arlyss Person,         Readmission Risk              Risk of Unplanned Readmission:  0             Met with:patient to discuss discharge plans. Information verified: address, contacts, phone number, , insurance Yes  PCP: John Coto DO    Date of last visit: 2021-follows with physician at [facility] Provider: Katlin Ibarra Medicare    Discharge Planning  Current Residence:   [facility]  Living Arrangements:    snf  Support Systems:   daughters/friends  Current Services PTA:   NA   Patient able to perform ADL's:Assisted  DME used to aid ambulation prior to admission: wheelchair, elevated toilet seat, shower seat, 2ww, and slideboard  During admission: wheelchair    Potential Assistance Needed:   return to [address] [place] [address] Dr: Adelina Wade orders meds   Potential Assistance Purchasing Medications:   no  Does patient want to participate in local refill/ meds to beds program?   no    Patient agreeable to home care: NA  [place] of choice provided:  n/a      Type of Home Care Services:     Patient expects to be discharged to:   SNF    Prior SNF/Rehab Placement and Facility: [address] to SNF/Rehab: Yes  [place] of choice provided: yes   Evaluation: yes    Expected Discharge date:   21  Follow Up Appointment: Best Day/ Time:      Transportation provider: daniellear  Transportation arrangements needed for discharge: Yes    Discharge Plan:   Patient is currently at [facility] for rehab. He plans to return at discharge. He had BKA in 2021. Patient has been following with De[facility] Ortega. [facility].. Wound Vac recently discontinued. Patient is receiving daily dressing changes. Dr. Ximena Flowers discontinued IV Vanco 21. Patient is prescribed Eliquis. Patient is admitted for pneumonia and pleural effusion. IV placed. Social work to follow for discharge needs.     The Plan for Transition of Care is related to the following treatment goals: [facility]    The Patient and/or patient representative was provided with a choice of provider and agrees with the discharge plan. [x] Yes [] No    Freedom of choice list was provided with basic dialogue that supports the patient's individualized plan of care/goals, treatment preferences and shares the quality data associated with the providers.  [x] Yes [] No          Electronically signed by HUNTER Pisano on 5/25/21 at 11:56 AM EDT

## 2021-05-25 NOTE — PROGRESS NOTES
Direct oral anticoagulant (DOAC) - Initial Pharmacy Review  PLAN    No obvious intervention needed. Ann Houser, [facility]  2021  3:42 PM    ASSESSMENT   Patient chart reviewed for indication and appropriateness of dose and therapy of Apixaban.:  Indication: History of DVT/PE. PATIENT INFORMATION   Body mass index is 19.13 kg/m². Wt Readings from Last 1 Encounters:   21 145 lb (65.8 kg)     Some sources recommend that DOACs be avoided in weight < 50 or > 120 kg, or BMI > 40 whenever possible; however. some smaller studies suggest that DOACs may be safe and efficacious in this population. Recent Labs     21  1012   CREATININE 0.81     Recent Labs     21  1012   HGB 7.9*   HCT 28.2*   *     No results for input(s): INR in the last 72 hours. Weight  kg ? BMI Less than   40 kg/m2 Calculated CrCl  Using ABW (mL/min) Other anticoagulants on MAR Drug interactions  (since admission) reviewed   yes    Wt Readings from Last 1 Encounters:   21 145 lb (65.8 kg)    yes    Body mass index is 19.13 kg/m². 87 ml/min        (140-age)*ABW    72 * sCr    X 0.85 for females No concurrent anticoagulants ordered.  No interactions/no new drug interactions identified requiring action.                 -----------------------------------------------------------------------------------------------------------------    CRCL Calculation for DOACs  (use ABW)    CrCl male:  (140-Age) * ABW           For female:  (140-Age) * ABW * 0.85                       72  *  sCr                                              72 * sCr           Apixaban (Eliquis)  Indication Dose (Oral) Renal Adjustment (CrCl calculated on ABW) Select Drug Interactions   NVAF 5mg BID 2.5mg BID  IF patient has TWO of the following:  Age>80, Weight <60 kg, SCr > 1.5  (If dialysis, but not meeting above criteria, keep 5 mg BID*)   Strong P-gp/CY inducers (Avoid combination)  Apalutamide, CarBAMazepine, Enzalutamide, Fosphenytoin, Lumacaftor, Mitotane, PHENobarbital, Phenytoin, Primidone, RifAMPin    P-gp/CY inhibitors (Avoid use or dose adjustment)  Clarithromycin, Itraconazole, Ketoconazole (systemic), Ombitasvir, Paritaprevir, Ritonavir       DVT/PE  Treatment 10mg BID x7d, then 5mg BID No dosage adjustment necessary; however, patients with a serum creatinine >2.5 mg/dL or CrCl <25 mL/minute (as determined by Cockcroft-Gault equation) were excluded from the clinical trials*    DVT/PE  Recurrence Preventions 5mg BID (or potentially 2.5mg BID after at least 6 months of treatment)     DVT prevention PostOp Knee: 2.5mg BID x 12d  Hip: 2.5mg BID x 35d No dosage adjustment necessary; however, patients with either clinically significant renal impairment, impaired renal function, or CrCl <30 mL/minute were excluded from the respective clinical trials. For patients receiving dual strong CY and P-glycoprotein inhibitors (eg, clarithromycin, ketoconazole, itraconazole, ritonavir) and apixaban doses >2.5 mg twice daily, reduce apixaban dose by 50%   *(Salvador, 2013a; Salvador, 2013b)            * Lino TA, et al. J Am Soc Nephrol 2017. doi:10.1681/ASN. [id number]  *Kylie SPRINGER, et al. Circulation 2018.  EIX:92.5656/DYBLVKTVJURESE  Vincent Antony et al. [facility] 2019  Taiwo Millan M, et al. Blood 0617;016:127  Jeovanny Mcallister et al. CHEST 2018  Sparkle Munroe et al. Lydia Pharmacother 2019  [facility]/GRICELDA et al. Circulation 2018

## 2021-05-25 NOTE — CARE COORDINATION
Social work: Noted patient from [facility] and wants to return. Called central intake [phone], April informed writer they can accept patient but will require a new precert for return.

## 2021-05-25 NOTE — PROGRESS NOTES
Transitions of Care Pharmacy Service   Medication Review    The patient's list of current home medications has been reviewed. Source(s) of information: [address] [place] [address] Medication List    Based on information provided by the above source(s), I have updated the patient's home med list as described below. Please review the ACTION REQUESTED section of this note below for any discrepancies on current hospital orders. I changed or updated the following medications on the patient's home medication list:  Removed none     Added none     Adjusted   Senna-S to Senna 8.6mg - 2 tabs bid prn  Maalox from 5ml to 10ml q6hrs prn   Other Notes none         PROVIDER ACTION REQUESTED  Medications that need to be addressed by a physician/nurse practitioner:    Medication Action Requested        none         Please feel free to call me with any questions about this encounter. Thank you. Rolando Frances [address] [place] [address]   Transitions of Care Pharmacy Service  Phone:  [phone]  Fax: [phone]      Electronically signed by Rolando Frances [facility] on 5/25/2021 at 1:43 PM           Not in a hospital admission.
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
## 2021-05-25 NOTE — PROGRESS NOTES
Pt admitted to room 2024 from ER  Patient alert and oriented x3. Oriented to room and call light/tv controls. Bed in lowest position, wheels locked, 2/4 side rails up  Call light in reach, room free of clutter, adequate lighting provided.

## 2021-05-25 NOTE — ED NOTES
Pt removed himself from monitor, states he \"needs a break to get my air\"     Master Malin, HA  05/25/21 [id number]

## 2021-05-25 NOTE — ED NOTES
Bed: 18  Expected date: 5/25/21  Expected time: 9:14 AM  Means of arrival:   Comments:  Michael Tavarez, [facility]  05/25/21 [id number]

## 2021-05-26 ENCOUNTER — HOSPITAL ENCOUNTER (OUTPATIENT)
Dept: WOUND CARE | Age: 66
Discharge: HOME | End: 2021-05-26

## 2021-05-26 ENCOUNTER — APPOINTMENT (OUTPATIENT)
Dept: GENERAL RADIOLOGY | Age: 66
DRG: 167 | End: 2021-05-26
Payer: MEDICARE

## 2021-05-26 DIAGNOSIS — Z79.4 TYPE 2 DIABETES MELLITUS WITH DIABETIC POLYNEUROPATHY, WITH LONG-TERM CURRENT USE OF INSULIN (HCC): ICD-10-CM

## 2021-05-26 DIAGNOSIS — I73.9 PAD (PERIPHERAL ARTERY DISEASE) (HCC): ICD-10-CM

## 2021-05-26 DIAGNOSIS — L97.922 LEG ULCER, LEFT, WITH FAT LAYER EXPOSED (HCC): ICD-10-CM

## 2021-05-26 DIAGNOSIS — F17.200 TOBACCO DEPENDENCE: ICD-10-CM

## 2021-05-26 DIAGNOSIS — Z89.512 HISTORY OF BELOW KNEE AMPUTATION, LEFT (HCC): Primary | ICD-10-CM

## 2021-05-26 DIAGNOSIS — E44.0 MODERATE PROTEIN MALNUTRITION (HCC): ICD-10-CM

## 2021-05-26 DIAGNOSIS — E11.42 TYPE 2 DIABETES MELLITUS WITH DIABETIC POLYNEUROPATHY, WITH LONG-TERM CURRENT USE OF INSULIN (HCC): ICD-10-CM

## 2021-05-26 DIAGNOSIS — Z89.511 HISTORY OF BELOW KNEE AMPUTATION, RIGHT (HCC): ICD-10-CM

## 2021-05-26 LAB
ANION GAP SERPL CALCULATED.3IONS-SCNC: 10 MMOL/L (ref 9–17)
BUN BLDV-MCNC: 22 MG/DL (ref 8–23)
BUN/CREAT BLD: 18 (ref 9–20)
CALCIUM SERPL-MCNC: 8.5 MG/DL (ref 8.6–10.4)
CHLORIDE BLD-SCNC: 101 MMOL/L (ref 98–107)
CO2: 30 MMOL/L (ref 20–31)
CREAT SERPL-MCNC: 1.25 MG/DL (ref 0.7–1.2)
GFR AFRICAN AMERICAN: >60 ML/MIN
GFR NON-AFRICAN AMERICAN: 58 ML/MIN
GFR SERPL CREATININE-BSD FRML MDRD: ABNORMAL ML/MIN/{1.73_M2}
GFR SERPL CREATININE-BSD FRML MDRD: ABNORMAL ML/MIN/{1.73_M2}
GLUCOSE BLD-MCNC: 121 MG/DL (ref 75–110)
GLUCOSE BLD-MCNC: 127 MG/DL (ref 70–99)
GLUCOSE BLD-MCNC: 153 MG/DL (ref 75–110)
GLUCOSE BLD-MCNC: 173 MG/DL (ref 75–110)
GLUCOSE BLD-MCNC: 199 MG/DL (ref 75–110)
HCT VFR BLD CALC: 27.2 % (ref 40.7–50.3)
HEMOGLOBIN: 7.5 G/DL (ref 13–17)
INR BLD: 1.1
IRON SATURATION: 7 % (ref 20–55)
IRON: 23 UG/DL (ref 59–158)
MCH RBC QN AUTO: 22.9 PG (ref 25.2–33.5)
MCHC RBC AUTO-ENTMCNC: 27.6 G/DL (ref 28.4–34.8)
MCV RBC AUTO: 83.2 FL (ref 82.6–102.9)
NRBC AUTOMATED: 0 PER 100 WBC
PDW BLD-RTO: 17.4 % (ref 11.8–14.4)
PLATELET # BLD: 441 K/UL (ref 138–453)
PMV BLD AUTO: 9.4 FL (ref 8.1–13.5)
POTASSIUM SERPL-SCNC: 4.3 MMOL/L (ref 3.7–5.3)
PROTHROMBIN TIME: 14.4 SEC (ref 11.5–14.2)
RBC # BLD: 3.27 M/UL (ref 4.21–5.77)
SODIUM BLD-SCNC: 141 MMOL/L (ref 135–144)
TOTAL IRON BINDING CAPACITY: 328 UG/DL (ref 250–450)
UNSATURATED IRON BINDING CAPACITY: 305 UG/DL (ref 112–347)
WBC # BLD: 9.3 K/UL (ref 3.5–11.3)

## 2021-05-26 PROCEDURE — 99232 SBSQ HOSP IP/OBS MODERATE 35: CPT | Performed by: INTERNAL MEDICINE

## 2021-05-26 PROCEDURE — 6370000000 HC RX 637 (ALT 250 FOR IP): Performed by: INTERNAL MEDICINE

## 2021-05-26 PROCEDURE — 36415 COLL VENOUS BLD VENIPUNCTURE: CPT

## 2021-05-26 PROCEDURE — 2580000003 HC RX 258: Performed by: NURSE PRACTITIONER

## 2021-05-26 PROCEDURE — APPNB30 APP NON BILLABLE TIME 0-30 MINS: Performed by: NURSE PRACTITIONER

## 2021-05-26 PROCEDURE — 97530 THERAPEUTIC ACTIVITIES: CPT

## 2021-05-26 PROCEDURE — 99222 1ST HOSP IP/OBS MODERATE 55: CPT | Performed by: INTERNAL MEDICINE

## 2021-05-26 PROCEDURE — 1200000000 HC SEMI PRIVATE

## 2021-05-26 PROCEDURE — 71045 X-RAY EXAM CHEST 1 VIEW: CPT

## 2021-05-26 PROCEDURE — 85027 COMPLETE CBC AUTOMATED: CPT

## 2021-05-26 PROCEDURE — APPSS45 APP SPLIT SHARED TIME 31-45 MINUTES: Performed by: NURSE PRACTITIONER

## 2021-05-26 PROCEDURE — 94761 N-INVAS EAR/PLS OXIMETRY MLT: CPT

## 2021-05-26 PROCEDURE — 6360000002 HC RX W HCPCS: Performed by: NURSE PRACTITIONER

## 2021-05-26 PROCEDURE — 94640 AIRWAY INHALATION TREATMENT: CPT

## 2021-05-26 PROCEDURE — 85610 PROTHROMBIN TIME: CPT

## 2021-05-26 PROCEDURE — 97535 SELF CARE MNGMENT TRAINING: CPT

## 2021-05-26 PROCEDURE — 97163 PT EVAL HIGH COMPLEX 45 MIN: CPT

## 2021-05-26 PROCEDURE — 97167 OT EVAL HIGH COMPLEX 60 MIN: CPT

## 2021-05-26 PROCEDURE — 6370000000 HC RX 637 (ALT 250 FOR IP): Performed by: NURSE PRACTITIONER

## 2021-05-26 PROCEDURE — 80048 BASIC METABOLIC PNL TOTAL CA: CPT

## 2021-05-26 PROCEDURE — 82947 ASSAY GLUCOSE BLOOD QUANT: CPT

## 2021-05-26 PROCEDURE — 2700000000 HC OXYGEN THERAPY PER DAY

## 2021-05-26 PROCEDURE — 99213 OFFICE O/P EST LOW 20 MIN: CPT

## 2021-05-26 RX ADMIN — PROBIOTIC PRODUCT - TAB 1 TABLET: TAB at 21:26

## 2021-05-26 RX ADMIN — SODIUM CHLORIDE, PRESERVATIVE FREE 10 ML: 5 INJECTION INTRAVENOUS at 21:25

## 2021-05-26 RX ADMIN — PROBIOTIC PRODUCT - TAB 1 TABLET: TAB at 09:11

## 2021-05-26 RX ADMIN — INSULIN LISPRO 1 UNITS: 100 INJECTION, SOLUTION INTRAVENOUS; SUBCUTANEOUS at 21:26

## 2021-05-26 RX ADMIN — BUDESONIDE AND FORMOTEROL FUMARATE DIHYDRATE 2 PUFF: 160; 4.5 AEROSOL RESPIRATORY (INHALATION) at 21:45

## 2021-05-26 RX ADMIN — OXYCODONE AND ACETAMINOPHEN 1 TABLET: 5; 325 TABLET ORAL at 18:37

## 2021-05-26 RX ADMIN — IRON SUCROSE 300 MG: 20 INJECTION, SOLUTION INTRAVENOUS at 09:22

## 2021-05-26 RX ADMIN — AMITRIPTYLINE HYDROCHLORIDE 75 MG: 50 TABLET, FILM COATED ORAL at 21:26

## 2021-05-26 RX ADMIN — INSULIN GLARGINE 25 UNITS: 100 INJECTION, SOLUTION SUBCUTANEOUS at 18:37

## 2021-05-26 RX ADMIN — INSULIN LISPRO 2 UNITS: 100 INJECTION, SOLUTION INTRAVENOUS; SUBCUTANEOUS at 12:36

## 2021-05-26 RX ADMIN — SIMETHICONE 80 MG: 80 TABLET, CHEWABLE ORAL at 21:44

## 2021-05-26 RX ADMIN — METFORMIN HYDROCHLORIDE 500 MG: 500 TABLET ORAL at 18:37

## 2021-05-26 RX ADMIN — OXYCODONE AND ACETAMINOPHEN 1 TABLET: 5; 325 TABLET ORAL at 06:45

## 2021-05-26 RX ADMIN — BISACODYL 5 MG: 5 TABLET, COATED ORAL at 23:10

## 2021-05-26 RX ADMIN — INSULIN LISPRO 2 UNITS: 100 INJECTION, SOLUTION INTRAVENOUS; SUBCUTANEOUS at 18:37

## 2021-05-26 RX ADMIN — TAMSULOSIN HYDROCHLORIDE 0.4 MG: 0.4 CAPSULE ORAL at 09:11

## 2021-05-26 RX ADMIN — LEVOFLOXACIN 750 MG: 500 TABLET, FILM COATED ORAL at 09:11

## 2021-05-26 RX ADMIN — OXYCODONE AND ACETAMINOPHEN 1 TABLET: 5; 325 TABLET ORAL at 23:05

## 2021-05-26 RX ADMIN — APIXABAN 5 MG: 5 TABLET, FILM COATED ORAL at 09:11

## 2021-05-26 RX ADMIN — APIXABAN 5 MG: 5 TABLET, FILM COATED ORAL at 21:26

## 2021-05-26 RX ADMIN — ALUMINUM HYDROXIDE, MAGNESIUM HYDROXIDE, AND SIMETHICONE 30 ML: 200; 200; 20 SUSPENSION ORAL at 21:15

## 2021-05-26 RX ADMIN — METFORMIN HYDROCHLORIDE 500 MG: 500 TABLET ORAL at 09:12

## 2021-05-26 RX ADMIN — ZOLPIDEM TARTRATE 5 MG: 5 TABLET ORAL at 23:05

## 2021-05-26 RX ADMIN — SODIUM CHLORIDE, PRESERVATIVE FREE 10 ML: 5 INJECTION INTRAVENOUS at 09:12

## 2021-05-26 RX ADMIN — MORPHINE SULFATE 4 MG: 4 INJECTION, SOLUTION INTRAMUSCULAR; INTRAVENOUS at 21:25

## 2021-05-26 RX ADMIN — OXYCODONE AND ACETAMINOPHEN 1 TABLET: 5; 325 TABLET ORAL at 12:39

## 2021-05-26 RX ADMIN — MORPHINE SULFATE 4 MG: 4 INJECTION, SOLUTION INTRAMUSCULAR; INTRAVENOUS at 09:24

## 2021-05-26 RX ADMIN — BUDESONIDE AND FORMOTEROL FUMARATE DIHYDRATE 2 PUFF: 160; 4.5 AEROSOL RESPIRATORY (INHALATION) at 09:41

## 2021-05-26 ASSESSMENT — PAIN DESCRIPTION - PAIN TYPE
TYPE: SURGICAL PAIN;CHRONIC PAIN
TYPE: SURGICAL PAIN;CHRONIC PAIN
TYPE: SURGICAL PAIN
TYPE: CHRONIC PAIN
TYPE: CHRONIC PAIN

## 2021-05-26 ASSESSMENT — PAIN SCALES - GENERAL
PAINLEVEL_OUTOF10: 8
PAINLEVEL_OUTOF10: 8
PAINLEVEL_OUTOF10: 6
PAINLEVEL_OUTOF10: 8
PAINLEVEL_OUTOF10: 6
PAINLEVEL_OUTOF10: 4
PAINLEVEL_OUTOF10: 7
PAINLEVEL_OUTOF10: 6
PAINLEVEL_OUTOF10: 8
PAINLEVEL_OUTOF10: 7
PAINLEVEL_OUTOF10: 7
PAINLEVEL_OUTOF10: 8

## 2021-05-26 ASSESSMENT — PAIN DESCRIPTION - DESCRIPTORS
DESCRIPTORS: ACHING;DISCOMFORT

## 2021-05-26 ASSESSMENT — PAIN DESCRIPTION - ORIENTATION
ORIENTATION: LEFT;LOWER
ORIENTATION: LEFT;LOWER
ORIENTATION: LEFT
ORIENTATION: LEFT;LOWER
ORIENTATION: LEFT

## 2021-05-26 ASSESSMENT — ENCOUNTER SYMPTOMS
ABDOMINAL PAIN: 1
NAUSEA: 1
SINUS PAIN: 0
SHORTNESS OF BREATH: 0
WHEEZING: 0
COLOR CHANGE: 0
PHOTOPHOBIA: 0
ABDOMINAL DISTENTION: 0
DIARRHEA: 0
SINUS PRESSURE: 0

## 2021-05-26 ASSESSMENT — PAIN DESCRIPTION - PROGRESSION
CLINICAL_PROGRESSION: NOT CHANGED

## 2021-05-26 ASSESSMENT — PAIN DESCRIPTION - ONSET
ONSET: ON-GOING

## 2021-05-26 ASSESSMENT — PAIN DESCRIPTION - FREQUENCY
FREQUENCY: INTERMITTENT

## 2021-05-26 ASSESSMENT — PAIN DESCRIPTION - LOCATION
LOCATION: LEG

## 2021-05-26 NOTE — PLAN OF CARE
Problem: Falls - Risk of:  Goal: Will remain free from falls  Description: Will remain free from falls  5/26/2021 1517 by Sahil Torres RN  Outcome: Ongoing  5/26/2021 0116 by Dede Gonsalez RN  Outcome: Ongoing  Goal: Absence of physical injury  Description: Absence of physical injury  Outcome: Ongoing     Problem: Skin Integrity:  Goal: Will show no infection signs and symptoms  Description: Will show no infection signs and symptoms  5/26/2021 1517 by Sahil Torres RN  Outcome: Ongoing  5/26/2021 0116 by Dede Gonsalez RN  Outcome: Ongoing  Goal: Absence of new skin breakdown  Description: Absence of new skin breakdown  Outcome: Ongoing     Problem: Pain:  Goal: Pain level will decrease  Description: Pain level will decrease  5/26/2021 1517 by Sahil Torres RN  Outcome: Ongoing  5/26/2021 0116 by Dede Gonsalez RN  Outcome: Ongoing  Goal: Control of acute pain  Description: Control of acute pain  Outcome: Ongoing  Goal: Control of chronic pain  Description: Control of chronic pain  Outcome: Ongoing     Problem: Airway Clearance - Ineffective:  Goal: Clear lung sounds  Description: Clear lung sounds  Outcome: Ongoing  Goal: Ability to maintain a clear airway will improve  Description: Ability to maintain a clear airway will improve  Outcome: Ongoing     Problem: IP BALANCE  Goal: LTG - Patient will maintain balance to allow for safe/functional mobility  Outcome: Ongoing     Problem: Discharge Planning:  Goal: Discharged to appropriate level of care  Description: Discharged to appropriate level of care  Outcome: Ongoing     Problem: Serum Glucose Level - Abnormal:  Goal: Ability to maintain appropriate glucose levels will improve  Description: Ability to maintain appropriate glucose levels will improve  Outcome: Ongoing     Problem: Sensory Perception - Impaired:  Goal: Ability to maintain a stable neurologic state will improve  Description: Ability to maintain a stable neurologic state will improve  Outcome: Ongoing

## 2021-05-26 NOTE — PLAN OF CARE
Problem: Falls - Risk of:  Goal: Will remain free from falls  Description: Will remain free from falls  5/26/2021 0116 by Annette Rob RN  Outcome: Ongoing  5/25/2021 2110 by Liliana Rivera RN  Outcome: Ongoing  Note: Siderails up x 2  Hourly rounding  Call light in reach  Instructed to call for assist before attempting out of bed. Remains free from falls and accidental injury at this time   Floor free from obstacles  Bed is locked and in lowest position  Adequate lighting provided  Pt refusing bed alarm. Explained the risk and benefits to patient and patient still refusing alarm. Goal: Absence of physical injury  Description: Absence of physical injury  5/25/2021 2110 by Liliana Rivera RN  Outcome: Ongoing     Problem: Skin Integrity:  Goal: Will show no infection signs and symptoms  Description: Will show no infection signs and symptoms  5/26/2021 0116 by Annette Rob RN  Outcome: Ongoing  5/25/2021 2110 by Liliana Rivera RN  Outcome: Ongoing  Goal: Absence of new skin breakdown  Description: Absence of new skin breakdown  5/25/2021 2110 by Liliana Rivera RN  Outcome: Ongoing     Problem: Pain:  Goal: Pain level will decrease  Description: Pain level will decrease  5/26/2021 0116 by Annette Rob RN  Outcome: Ongoing  5/25/2021 2110 by Liliana Rivera RN  Outcome: Ongoing  Note: Pain level assessment complete.    Patient educated on pain scale and control interventions  PRN pain medication given per patient request-Morphine & percocet  Patient instructed to call out with new onset of pain or unrelieved pain    Goal: Control of acute pain  Description: Control of acute pain  5/25/2021 2110 by Liliana Rivera RN  Outcome: Ongoing  Goal: Control of chronic pain  Description: Control of chronic pain  5/25/2021 2110 by Liliana Rivera RN  Outcome: Ongoing

## 2021-05-26 NOTE — PROGRESS NOTES
Comprehensive Nutrition Assessment    Type and Reason for Visit:  Positive Nutrition Screen    Nutrition Recommendations/Plan:   1 Modify current diet to carbohydrate control 4 carbs/meal  2 Add Ensure High Protein supplement two times/day to diet  3 Monitor p o intake, acceptance of Ensure High Protein supplement, and labs. Nutrition Assessment:  Patient admitted for pleural effusion associated with pulmonary infection. Patient also has a wound on left stump. Writer suggested high protein supplement and patient willing to try. Patient enjoys meals and states \"the food is like being at a hotel. \"  Patient is diabetic. Currently on general diet but will change to carbohydrate control diet. Patient states that he went from #156 to #144 in one month related to diarrhea from Vancomycin and small portion sizes served at [employer]. Monitor p o intake, acceptance of Ensure High Protein, and labs. Malnutrition Assessment:  Malnutrition Status: At risk for malnutrition (Comment)      Estimated Daily Nutrient Needs:  Energy (kcal):  4939-0931 kcal (22-25 kcal/kg); Weight Used for Energy Requirements:  Adjusted     Protein (g):   kcal (1.3-1.5 g/kg); Weight Used for Protein Requirements:  Adjusted    Nutrition Related Findings:  Rencent BTK amputation (2/21), Non healing surgical wound on left stump. No edema present. p o intake 100%. HgB 7.5 (L). Receiving iron sucrose.       Wounds:  Surgical Incision       Current Nutrition Therapies:    DIET GENERAL;  Dietary Nutrition Supplements: Low Calorie High Protein Supplement    Anthropometric Measures:  · Height: 6' 1\" (185.4 cm)  · Current Body Weight: 145 lb (65.8 kg) (current weight on 5/26 is recorded as 176lb which is likely incorrect)   · Admission Body Weight: 145 lb (65.8 kg)    · Usual Body Weight: 153 lb (69.4 kg)     · Ideal Body Weight: 184 lbs; % Ideal Body Weight 78.8 %   · BMI: 19.1  · Adjusted Body Weight: 162.1; Amputation   · Adjusted BMI:
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
## 2021-05-26 NOTE — PROGRESS NOTES
The patient is sitting in bed arguing  On the phone,he has been on the phone all day and states \"I'm stressed out because I sold my house and I'm moving and nobody is helping\". PICC line removal order re discussed. \"Give me a minute this is important\".

## 2021-05-26 NOTE — PLAN OF CARE
Problem: Falls - Risk of:  Goal: Will remain free from falls  Description: Will remain free from falls  Outcome: Ongoing  Note: Siderails up x 2  Hourly rounding  Call light in reach  Instructed to call for assist before attempting out of bed. Remains free from falls and accidental injury at this time   Floor free from obstacles  Bed is locked and in lowest position  Adequate lighting provided  Pt refusing bed alarm. Explained the risk and benefits to patient and patient still refusing alarm. Goal: Absence of physical injury  Description: Absence of physical injury  Outcome: Ongoing     Problem: Skin Integrity:  Goal: Will show no infection signs and symptoms  Description: Will show no infection signs and symptoms  Outcome: Ongoing  Goal: Absence of new skin breakdown  Description: Absence of new skin breakdown  Outcome: Ongoing     Problem: Pain:  Goal: Pain level will decrease  Description: Pain level will decrease  Outcome: Ongoing  Note: Pain level assessment complete.    Patient educated on pain scale and control interventions  PRN pain medication given per patient request-Morphine & percocet  Patient instructed to call out with new onset of pain or unrelieved pain    Goal: Control of acute pain  Description: Control of acute pain  Outcome: Ongoing  Goal: Control of chronic pain  Description: Control of chronic pain  Outcome: Ongoing

## 2021-05-26 NOTE — PROGRESS NOTES
topic. Patient perseverating on previous issues the physicians and old abdominal scar. AROM RLE (degrees)  RLE General AROM: hip and knee WFL  AROM LLE (degrees)  LLE General AROM: hip and knee WFL  Strength Other  Other: Did not MMT due to patient getting progressively more upset, Bilateral hip and knee appear at least 3+/5  Tone RLE  RLE Tone: Normotonic  Tone LLE  LLE Tone: Normotonic  Motor Control  Gross Motor?: Exceptions  Comments: Decreased motor planning     Bed mobility  Rolling to Left: Modified independent  Rolling to Right: Modified independent  Supine to Sit: Stand by assistance  Sit to Supine: Stand by assistance  Scooting: Stand by assistance  Comment: Patient with decreased safety due to impulsivity and decreased awareness of lines and cords  Transfers  Comment: Patient performs stand pivot transfer with prosthesis on RLE. Patient declined use of RW and refused to allow PT to assist. Patient very impulsive with transfers with no regard for lines and cords or safety. Patient barely made it to chair for bed to chair transfer. Patient slightly safer with transfer back to bed. Ambulation  Ambulation?: No  WB Status: Not applicable     Balance  Sitting - Static: Good  Sitting - Dynamic: Good  Standing - Static: Poor  Standing - Dynamic: Poor        Plan   Plan  Times per week: 1-2x/d, 5-6 d/wk  Current Treatment Recommendations: Strengthening, Balance Training, Functional Mobility Training, Transfer Training, Endurance Training, Safety Education & Training, Patient/Caregiver Education & Training  Safety Devices  Type of devices:  All fall risk precautions in place, Gait belt, Nurse notified, Call light within reach, Left in bed    OutComes Score    AM-PAC Score  AM-PAC Inpatient Mobility Raw Score : 13 (05/26/21 1419)  AM-PAC Inpatient T-Scale Score : 36.74 (05/26/21 1419)  Mobility Inpatient CMS 0-100% Score: 64.91 (05/26/21 1419)  Mobility Inpatient CMS G-Code Modifier : CL (05/26/21 1419) Goals  Short term goals  Time Frame for Short term goals: 12 visits  Short term goal 1: Patient will be indep with bed mobility. Short term goal 2: Patient will SAFELY transfers bed to chair with right LE prosthesis and SBA. Short term goal 3: Patient will tolerate 30 minutes of ther-ex and ther-act. Patient Goals   Patient goals : Return to SNF     Co-treatment with OT warranted secondary to decreased safety and independence requiring 2 skilled therapy professionals to address individual discipline's goals. PT addressing pre gait trunk strengthening, weight shifting prior to transfers and transfer training. Patient required co-tx due to impulsivity and aggitation.    Therapy Time   Individual Concurrent Group Co-treatment   Time In 1057         Time Out 1142         Minutes 45         Timed Code Treatment Minutes: [address] [facility] [address], PT
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
## 2021-05-26 NOTE — PROGRESS NOTES
Occupational Therapy   Occupational Therapy Initial Assessment  Date: 2021   Patient Name: Dex Goddard  MRN: [id number]     : 1955    RN JORGE reports patient is medically stable for therapy treatment this date. Chart reviewed prior to treatment and patient is agreeable for therapy. All lines intact and patient positioned comfortably at end of treatment. All patient needs addressed prior to ending therapy session. Date of Service: 2021    Discharge Recommendations:  Patient would benefit from continued therapy after discharge  OT Equipment Recommendations  Equipment Needed: Yes  Mobility Devices: ADL Assistive Devices  ADL Assistive Devices: Emergency Alert System; Reacher;Transfer Tub Bench;Grab Bars - shower    Assessment   Performance deficits / Impairments: Decreased functional mobility ; Decreased safe awareness;Decreased ADL status; Decreased cognition;Decreased balance;Decreased coordination;Decreased posture;Decreased high-level IADLs;Decreased endurance;Decreased strength;Decreased sensation;Decreased fine motor control  Assessment: Skilled OT services are indicated to increase I and safety during functional and ADL tasks to return home at prior level of function as able. Prognosis: Good;Fair  Decision Making: High Complexity  OT Education: OT Role;Transfer Training;Energy Conservation;Plan of Care;ADL Adaptive Strategies  Patient Education: safety in function, fall prevention/call light use, recommendations for continued therapy, benefits of being OOB, incentive spiromitor use,  Barriers to Learning: aggitation  REQUIRES OT FOLLOW UP: Yes  Activity Tolerance  Activity Tolerance: Treatment limited secondary to agitation;Treatment limited secondary to decreased cognition  Activity Tolerance: Poor -, Pt became very aggitated with lines when transfering to chair refusing assistance from OT/PT.  When therapist attempted to assist with lines pt aggressively started to pull at driving and ADLs prior to Left  BKA * Pt may be poor historian of PLOF       Objective   Vision: Within Functional Limits  Hearing: Within functional limits    Orientation  Overall Orientation Status: Within Functional Limits  Observation/Palpation  Posture: Poor  Observation: Patient initially conversational and pleasant with therapist, but quickly appeared frustrated and aggitated about lines and cords. Patient very tangental in speech and difficult to keep on topic. Patient perseverating on previous issues the physicians and old abdominal scar. Edema: none  Scar: B TKA; L residual limb wrapped in ace wrap d/t wound healing     Balance  Sitting Balance: Stand by assistance  Standing Balance: Moderate assistance (No AD stand Pivot to bedside chair)  Standing Balance  Time: standing ace < 15 seconds  Functional Mobility  Functional Mobility Comments: No steps taken this date, pt only completes stand pivots with R prostetic. Toilet Transfers  Toilet Transfer: Unable to assess  Toilet Transfers Comments: N/T Pt with no needs at this time     ADL  Feeding: Setup  Grooming: Setup;Stand by assistance (seated)  UE Bathing: Setup;Stand by assistance  LE Bathing: Setup;Stand by assistance (seated)  UE Dressing: Setup;Minimal assistance (with hosp gown)  LE Dressing: Setup;Stand by assistance (to don R prostetic while sitting EOB)  Toileting: Maximum assistance  Additional Comments: Pt is extremly impulsive and easily agitated     Tone RUE  RUE Tone: Normotonic  Tone LUE  LUE Tone: Normotonic  Coordination  Movements Are Fluid And Coordinated: No  Coordination and Movement description: Fine motor impairments; Left UE;Decreased speed;Right UE;Decreased accuracy     Bed mobility  Rolling to Left: Modified independent  Rolling to Right: Modified independent  Supine to Sit: Stand by assistance  Sit to Supine: Stand by assistance  Scooting: Stand by assistance  Comment: Pt with little to no safety awareness, very impulsive with decreased awareness of lines and cords. Transfers  Stand Pivot Transfers: Moderate assistance;2 Person assistance  Sit to stand: Moderate assistance  Stand to sit: Moderate assistance  Transfer Comments: Pt VERY impulsive during stand pivot transfer to chair. Pt declined RW use and refused OT/PT assist, Pt impulsivly stood with no awareness of lines x2 staff assist needed for safety one for line mgmt and one to hold chair. Patient barely made it to chair for bed to chair transfer. Patient slightly safer with transfer back to bed. Cognition  Overall Cognitive Status: Exceptions  Arousal/Alertness: Appropriate responses to stimuli  Following Commands: Follows one step commands consistently; Follows multistep commands with increased time; Follows multistep commands with repitition  Attention Span: Attends with cues to redirect; Difficulty dividing attention  Memory: Appears intact  Safety Judgement: Decreased awareness of need for assistance;Decreased awareness of need for safety  Problem Solving: Decreased awareness of errors;Assistance required to identify errors made;Assistance required to implement solutions;Assistance required to correct errors made;Assistance required to generate solutions  Insights: Decreased awareness of deficits  Initiation: Requires cues for some  Sequencing: Requires cues for some  Cognition Comment: Pt would benefit from continued cognitive assessments        Sensation  Overall Sensation Status: Impaired        LUE AROM (degrees)  LUE AROM : WFL  RUE AROM (degrees)  RUE AROM : WFL  LUE Strength  Gross LUE Strength: WFL  LUE Strength Comment: 4/5  RUE Strength  Gross RUE Strength: WFL  RUE Strength Comment: 4/5                   Plan   Plan  Times per week: 4-5x/wk 1x/day as ace  Current Treatment Recommendations: Strengthening, Endurance Training, Balance Training, Functional Mobility Training, Stair training, Safety Education & Training, Pain Management, Positioning, Home Management Training, Self-Care / ADL, Equipment Evaluation, Education, & procurement, Patient/Caregiver Education & Training, Neuromuscular Re-education           AM-PAC Score        AM-PAC Inpatient Daily Activity Raw Score: 17 (05/26/21 1527)  AM-PAC Inpatient ADL T-Scale Score : 37.26 (05/26/21 1527)  ADL Inpatient CMS 0-100% Score: 50.11 (05/26/21 1527)  ADL Inpatient CMS G-Code Modifier : CK (05/26/21 1527)    Goals  Short term goals  Time Frame for Short term goals: By discharge, pt to demo  Short term goal 1: bed mobility to Mod I with use of bedrails as needed using proper safety. Short term goal 2: ADL transfers and functional mobility to Min A using AD and proper safety. Short term goal 3: UB/LB ADLs to Set up with use of AD/AE as needed. Short term goal 4: increased BUE strength by 1/2 grade to assist with self care/I with simple BUE HEP with use of hand outs as needed. Short term goal 5: toileting to SBA with use of BSC/AD/grab bars as needed. Long term goals  Long term goal 1: Pt to be I with fall prevention education, EC/WS tech, relaxation tech and recommendations for AE with use of handouts as needed. Patient Goals   Patient goals : To go home! Therapy Time   Individual Concurrent Group Co-treatment   Time In 1057 (plus 10 min for chart review/RN communication)         Time Out 1132         Minutes 35+ 10 = 45          tx time: 30 min     Co-treatment with PT warranted secondary to increased agitation, decreased safety and independence requiring 2 skilled therapy professionals to address individual discipline's goals.          Aidee Gonzales OT

## 2021-05-26 NOTE — CONSULTS
SOFT TISSUE performed by Thanh Crane DPM at [facility] Left 1/5/2021    LEFT FOOT DEBRIDEMENT WITH REMOVAL ALL NON VIABLE SOFT TISSUE AND BONE performed by Thanh Crane DPM at [facility] Right 11/03/2016    I & D heel    FOOT SURGERY Right 12/31/2016    I & D    FRACTURE SURGERY Left 9/5/2015    humerus left, left leg    IR INS PICC VAD W SQ PORT GREATER THAN 5  11/6/2020    IR INS PICC VAD W SQ PORT GREATER THAN 5 11/6/2020 [facility]    IR INS PICC VAD W SQ PORT GREATER THAN 5  1/11/2021    IR INS PICC VAD W SQ PORT GREATER THAN 5 1/11/2021 [facility]    IR INS PICC VAD W SQ PORT GREATER THAN 5  4/8/2021    IR INS PICC VAD W SQ PORT GREATER THAN 5 4/8/2021 [facility]    LEG AMPUTATION BELOW KNEE Right 01/21/2017    LEG AMPUTATION BELOW KNEE Left 2/9/2021    LEFT  LEG AMPUTATION BELOW KNEE [facility] Left 4/6/2021    STUMP DEBRIDEMENT INCISION AND DRAINAGE performed by Omar Melara MD at [place] [address] Right 2014    rt 3rd through 5th digits    TOE AMPUTATION Left 5/26/2016    left foot 5th toe    TOE AMPUTATION Left 8/5/2020    FOOT TRANSMETATARSAL  AMPUTATION - AND LEFT PECUTANEOUS TENDO ACHILLES LENGTHENING performed by Xi Del Castillo DPM at [address] TOE AMPUTATION Left 8/24/2020    REVISION  TRANSMETATARSAL AMPUTATION WITH DEBRIDEMENT. performed by Xi Del Castillo DPM at [facility]      5/14/13- with dilation    VASCULAR SURGERY Right 01/16/2017    foot guillotine amputation       FAMILY HISTORY    Family History   Problem Relation Age of Onset    Diabetes Mother     Cancer Mother     Alcohol Abuse Father     Cancer Sister     Alcohol Abuse Maternal Aunt     Alcohol Abuse Maternal Uncle     Alcohol Abuse Paternal Aunt        SOCIAL HISTORY    Social History     Tobacco Use    Smoking status: Former Smoker     Packs/day: 1.00     Years: 30.00     Pack years: 30.00     Types: Cigarettes     Quit date: 2020     Years since quittin.5    Smokeless tobacco: Former User     Types: Chew     Quit date:    Vaping Use    Vaping Use: Never used   Substance Use Topics    Alcohol use:  Yes     Alcohol/week: 0.0 standard drinks     Comment:  states occ    Drug use: Not Currently     Types: Marijuana       ALLERGIES    Allergies   Allergen Reactions    Gabapentin Other (See Comments)     dizziness    Other            Objective:      BP (!) 148/91   Pulse 100   Temp 98 °F (36.7 °C) (Oral)   Resp 16   Ht 6' 1\" (1.854 m)   Wt 176 lb (79.8 kg)   SpO2 95%   BMI 23.22 kg/m²       LABS    CBC:   Lab Results   Component Value Date    WBC 9.3 2021    RBC 3.27 2021    RBC 4.32 2012    HGB 7.5 2021     CMP:  Albumin:    Lab Results   Component Value Date    LABALBU 3.3 2021    LABALBU 4.4 2012     PT/INR:    Lab Results   Component Value Date    PROTIME 14.4 2021    PROTIME 10.5 2012    INR 1.1 2021     HgBA1c:    Lab Results   Component Value Date    LABA1C 9.7 2021     PTT: No components found for: LABPTT    Review of Systems    Assessment:     Physical Exam      Real Risk Score: Real Scale Score: 17    Patient Active Problem List   Diagnosis Code    Type 2 diabetes mellitus with diabetic polyneuropathy, with long-term current use of insulin ([facility]) E11.42, Z79.4    Neuropathic pain, leg M79.2    Diabetic polyneuropathy ([facility]) E11.42    Allergic rhinitis J30.9    Tobacco dependence F17.200    Edentulous K08.109    Dysphagia R13.10    Lung nodules R91.8    Esophageal cancer ([facility]) C15.9    Fracture of humerus, left, closed S42.302A    Closed fracture of humerus S42.309A    DM type 2 with diabetic peripheral neuropathy ([facility]) E11.42    COPD without exacerbation ([facility]) J44.9    HLD (hyperlipidemia) E78.5    Gastroesophageal reflux disease K21.9    Chronic pain syndrome G89.4    Marijuana use F12.90    History of colon polyps Z86.010    Functional diarrhea K59.1    Sepsis due to methicillin resistant Staphylococcus aureus (MRSA) ([facility]) A41.02    History of esophageal cancer Z85.01    Osteomyelitis, chronic, ankle or foot ([facility] M86.679    PAD (peripheral artery disease) ([facility]) I73.9    Other pulmonary embolism without acute cor pulmonale ([facility]) I26.99    Carotid stenosis, asymptomatic, bilateral I65.23    Lower limb amputation status Z89.619    Fx humeral neck, right, closed, initial encounter S42.211A    Transient hypotension I95.9    Constipation due to opioid therapy K59.03, T40.2X5A    Acute kidney injury ([facility] D65.1    Complication of below knee amputation stump ([facility]) T87.9    COPD exacerbation ([facility]) J44.1    Hyponatremia E87.1    Pain, phantom limb ([facility]) G54.6    S/P BKA (below knee amputation) bilateral ([facility]) Z89.512, Z89.511    Moderate protein malnutrition ([facility]) E44.0    Essential hypertension I10    Uncontrolled type 2 diabetes mellitus with hyperglycemia ([facility]) E11.65    History of pulmonary embolism - 2017 Z86. 80    Atelectasis J98.11    Uncontrolled type 2 diabetes mellitus with foot ulcer ([facility]) E11.621, L97.509, E11.65    Azotemia R79.89    Anemia, normocytic normochromic D64.9    Drug-induced constipation K59.03    Osteomyelitis of metatarsals of left foot ([facility]) M86.9    Diabetic foot infection ([facility]) E11.628, L08.9    Noncompliance Z91.19    Type 1 diabetes mellitus with diabetic foot infection ([facility] E10.628, L08.9    Acute osteomyelitis of left foot ([facility]) M86.172    Cellulitis of left foot L03. 116    Mild malnutrition ([facility]) E44.1    Hypocalcemia E83.51    Hypokalemia E87.6    Moderate malnutrition ([facility]) E44.0    History of below knee amputation, right ([facility] Z89.511    Foot ulcer with fat layer exposed, left ([facility]) L97.522    Chronic refractory osteomyelitis of left foot ([address] M86.672    Sepsis ([address] A41.9    Pyogenic inflammation of bone ([address] M86.9    Cellulitis of left lower extremity at BKA stump L03. 80    History of below knee amputation, left (HCC) Z89.512    Leg ulcer, left, with fat layer exposed ([address] L97.922    S/P amputation Z89.9    Tobacco abuse Z72.0    Pneumonia due to organism J18.9    Abdominal pain R10.9    Marijuana abuse F12.10       Patient Active Problem List   Diagnosis    Type 2 diabetes mellitus with diabetic polyneuropathy, with long-term current use of insulin (HCC)    Neuropathic pain, leg    Diabetic polyneuropathy ([facility])    Allergic rhinitis    Tobacco dependence    Edentulous    Dysphagia    Lung nodules    Esophageal cancer (HCC)    Fracture of humerus, left, closed    Closed fracture of humerus    DM type 2 with diabetic peripheral neuropathy ([address]    COPD without exacerbation (HCC)    HLD (hyperlipidemia)    Gastroesophageal reflux disease    Chronic pain syndrome    Marijuana use    History of colon polyps    Functional diarrhea    Sepsis due to methicillin resistant Staphylococcus aureus (MRSA) ([facility])    History of esophageal cancer    Osteomyelitis, chronic, ankle or foot ([address]    PAD (peripheral artery disease) ([address]    Other pulmonary embolism without acute cor pulmonale ([facility])    Carotid stenosis, asymptomatic, bilateral    Lower limb amputation status    Fx humeral neck, right, closed, initial encounter    Transient hypotension    Constipation due to opioid therapy    Acute kidney injury ([address]    Complication of below knee amputation stump (HCC)    COPD exacerbation (HCC)    Hyponatremia    Pain, phantom limb ([address]    S/P BKA (below knee amputation) bilateral (HCC)    Moderate protein malnutrition ([address]    Essential hypertension    Uncontrolled type 2 diabetes mellitus with hyperglycemia ([address]    History of pulmonary embolism - 2017    Atelectasis    Uncontrolled type 2 diabetes mellitus with foot ulcer (HCC)    Azotemia    Anemia, normocytic normochromic    Drug-induced constipation    Osteomyelitis of metatarsals of left foot (HCC)    Diabetic foot infection (HCC)    Noncompliance    Type 1 diabetes mellitus with diabetic foot infection (HCC)    Acute osteomyelitis of left foot (HCC)    Cellulitis of left foot    Mild malnutrition (HCC)    Hypocalcemia    Hypokalemia    Moderate malnutrition (HCC)    History of below knee amputation, right (HCC)    Foot ulcer with fat layer exposed, left [facility]    Chronic refractory osteomyelitis of left foot ([facility])    Sepsis ([facility])    Pyogenic inflammation of bone ([facility])    Cellulitis of left lower extremity at BKA stump    History of below knee amputation, left ([facility])    Leg ulcer, left, with fat layer exposed ([facility])    S/P amputation    Tobacco abuse    Pneumonia due to organism    Abdominal pain    Marijuana abuse       Measurements:  Negative Pressure Wound Therapy Leg Anterior; Left (Active)   Number of days: 50       Wound 04/21/21 Leg Left;Distal;Medial #1 (Active)   Wound Etiology Non-Healing Surgical 05/26/21 0850   Dressing Status Old drainage noted;New dressing applied 05/26/21 0850   Wound Cleansed Cleansed with saline 05/26/21 0850   Dressing/Treatment Dry dressing 05/26/21 0400   Dressing Change Due 05/27/21 05/26/21 0850   Wound Length (cm) 0.3 cm 05/26/21 0850   Wound Width (cm) 0.8 cm 05/26/21 0850   Wound Depth (cm) 0.1 cm 05/26/21 0850   Wound Surface Area (cm^2) 0.24 cm^2 05/26/21 0850   Change in Wound Size % (l*w) 90.91 05/26/21 0850   Wound Volume (cm^3) 0.02 cm^3 05/26/21 0850   Wound Healing % 98 05/26/21 0850   Post-Procedure Length (cm) 0.2 cm 05/24/21 1320   Post-Procedure Width (cm) 0.3 cm 05/24/21 1320   Post-Procedure Depth (cm) 0.1 cm 05/24/21 1320   Post-Procedure Surface Area (cm^2) 0.06 cm^2 05/24/21 1320   Post-Procedure Volume (cm^3) 0.01 cm^3 05/24/21 1320   Wound Assessment Epithelialization;Pink/red 05/26/21 0850   Drainage Amount Scant 05/26/21 0850   Drainage Description Serosanguinous 05/26/21 0850   Odor None 05/26/21 0850   Odalys-wound Assessment Dry/flaky 05/26/21 0850   Margins Attached edges 05/26/21 0850   Wound Thickness Description not for Pressure Injury Full thickness 05/24/21 1257   Number of days: 34       Wound 04/21/21 Leg Left;Distal;Lateral #2 (Active)   Wound Etiology Non-Healing Surgical 05/26/21 0850   Dressing Status Old drainage noted;New dressing applied 05/26/21 0850   Wound Cleansed Cleansed with saline 05/26/21 0850   Dressing/Treatment Dry dressing 05/26/21 0400   Dressing Change Due 05/27/21 05/26/21 0850   Wound Length (cm) 1.7 cm 05/26/21 0850   Wound Width (cm) 1.7 cm 05/26/21 0850   Wound Depth (cm) 0.7 cm 05/26/21 0850   Wound Surface Area (cm^2) 2.89 cm^2 05/26/21 0850   Change in Wound Size % (l*w) -19.42 05/26/21 0850   Wound Volume (cm^3) 2.02 cm^3 05/26/21 0850   Wound Healing % 24 05/26/21 0850   Post-Procedure Length (cm) 1.7 cm 05/24/21 1320   Post-Procedure Width (cm) 1.4 cm 05/24/21 1320   Post-Procedure Depth (cm) 0.5 cm 05/24/21 1320   Post-Procedure Surface Area (cm^2) 2.38 cm^2 05/24/21 1320   Post-Procedure Volume (cm^3) 1.19 cm^3 05/24/21 1320   Distance Tunneling (cm) 1.4 cm 05/12/21 1433   Tunneling Position ___ O'Clock 10:00 05/12/21 1433   Wound Assessment Pink/red;Slough 05/26/21 0850   Drainage Amount Moderate 05/26/21 0850   Drainage Description Serosanguinous 05/26/21 0850   Odor None 05/26/21 0850   Odalys-wound Assessment Dry/flaky 05/26/21 0850   Margins Defined edges;Epibole (rolled edges) 05/26/21 0850   Wound Thickness Description not for Pressure Injury Full thickness 05/24/21 1257   Number of days: 34       Wound 04/21/21 Leg Left;Proximal #3 (Active)   Wound Etiology Non-Healing Surgical 05/26/21 0850   Dressing Status New dressing applied;Dry 05/26/21 0850   Wound Cleansed Cleansed with saline 05/26/21 0850 GENERAL;  Dietician consult:  N/A    Discharge Plan:  Placement for patient upon discharge: skilled nursing   Patient appropriate for Outpatient [address]: Yes    Patient/Caregiver Teaching:  Level of patientunderstanding able to:     [x] Indicates understanding       [] Needs reinforcement  [] Unsuccessful      [x] Verbal Understanding  [] Demonstrated understanding       [] No evidence of learning  [] Refused teaching         [] N/A       Electronically signed by Yumiko Garg RN on  5/26/2021 at 9:30 AM

## 2021-05-26 NOTE — PLAN OF CARE
Nutrition Problem #1: Increased nutrient needs  Intervention: Food and/or Nutrient Delivery: Modify Current Diet, Start Oral Nutrition Supplement  Nutritional Goals:  Increase protein intake at mealtime

## 2021-05-27 ENCOUNTER — APPOINTMENT (OUTPATIENT)
Dept: CT IMAGING | Age: 66
DRG: 167 | End: 2021-05-27
Payer: MEDICARE

## 2021-05-27 PROBLEM — K44.9 HIATUS HERNIA SYNDROME: Status: ACTIVE | Noted: 2021-05-27

## 2021-05-27 PROBLEM — J18.9 PARAPNEUMONIC EFFUSION: Status: ACTIVE | Noted: 2021-05-27

## 2021-05-27 PROBLEM — J91.8 PARAPNEUMONIC EFFUSION: Status: ACTIVE | Noted: 2021-05-27

## 2021-05-27 LAB
ANION GAP SERPL CALCULATED.3IONS-SCNC: 10 MMOL/L (ref 9–17)
BUN BLDV-MCNC: 26 MG/DL (ref 8–23)
BUN/CREAT BLD: 24 (ref 9–20)
CALCIUM SERPL-MCNC: 8.9 MG/DL (ref 8.6–10.4)
CHLORIDE BLD-SCNC: 100 MMOL/L (ref 98–107)
CO2: 29 MMOL/L (ref 20–31)
CREAT SERPL-MCNC: 1.1 MG/DL (ref 0.7–1.2)
GFR AFRICAN AMERICAN: >60 ML/MIN
GFR NON-AFRICAN AMERICAN: >60 ML/MIN
GFR SERPL CREATININE-BSD FRML MDRD: ABNORMAL ML/MIN/{1.73_M2}
GFR SERPL CREATININE-BSD FRML MDRD: ABNORMAL ML/MIN/{1.73_M2}
GLUCOSE BLD-MCNC: 134 MG/DL (ref 75–110)
GLUCOSE BLD-MCNC: 179 MG/DL (ref 75–110)
GLUCOSE BLD-MCNC: 185 MG/DL (ref 75–110)
GLUCOSE BLD-MCNC: 206 MG/DL (ref 70–99)
GLUCOSE BLD-MCNC: 206 MG/DL (ref 75–110)
POTASSIUM SERPL-SCNC: 4.9 MMOL/L (ref 3.7–5.3)
SODIUM BLD-SCNC: 139 MMOL/L (ref 135–144)

## 2021-05-27 PROCEDURE — 99232 SBSQ HOSP IP/OBS MODERATE 35: CPT | Performed by: INTERNAL MEDICINE

## 2021-05-27 PROCEDURE — 82947 ASSAY GLUCOSE BLOOD QUANT: CPT

## 2021-05-27 PROCEDURE — 74177 CT ABD & PELVIS W/CONTRAST: CPT

## 2021-05-27 PROCEDURE — 6360000002 HC RX W HCPCS: Performed by: NURSE PRACTITIONER

## 2021-05-27 PROCEDURE — 94761 N-INVAS EAR/PLS OXIMETRY MLT: CPT

## 2021-05-27 PROCEDURE — 6370000000 HC RX 637 (ALT 250 FOR IP): Performed by: NURSE PRACTITIONER

## 2021-05-27 PROCEDURE — 2580000003 HC RX 258: Performed by: NURSE PRACTITIONER

## 2021-05-27 PROCEDURE — 94640 AIRWAY INHALATION TREATMENT: CPT

## 2021-05-27 PROCEDURE — 6360000004 HC RX CONTRAST MEDICATION: Performed by: INTERNAL MEDICINE

## 2021-05-27 PROCEDURE — APPNB30 APP NON BILLABLE TIME 0-30 MINS: Performed by: NURSE PRACTITIONER

## 2021-05-27 PROCEDURE — 1200000000 HC SEMI PRIVATE

## 2021-05-27 PROCEDURE — APPSS30 APP SPLIT SHARED TIME 16-30 MINUTES: Performed by: NURSE PRACTITIONER

## 2021-05-27 PROCEDURE — 2580000003 HC RX 258: Performed by: INTERNAL MEDICINE

## 2021-05-27 PROCEDURE — 36415 COLL VENOUS BLD VENIPUNCTURE: CPT

## 2021-05-27 PROCEDURE — 80048 BASIC METABOLIC PNL TOTAL CA: CPT

## 2021-05-27 PROCEDURE — 2700000000 HC OXYGEN THERAPY PER DAY

## 2021-05-27 PROCEDURE — 6370000000 HC RX 637 (ALT 250 FOR IP): Performed by: INTERNAL MEDICINE

## 2021-05-27 PROCEDURE — APPSS45 APP SPLIT SHARED TIME 31-45 MINUTES: Performed by: NURSE PRACTITIONER

## 2021-05-27 RX ORDER — ZOLPIDEM TARTRATE 5 MG/1
5 TABLET ORAL NIGHTLY PRN
Qty: 14 TABLET | Refills: 0 | Status: SHIPPED | OUTPATIENT
Start: 2021-05-27 | End: 2021-05-28

## 2021-05-27 RX ORDER — SODIUM CHLORIDE 0.9 % (FLUSH) 0.9 %
10 SYRINGE (ML) INJECTION ONCE
Status: DISCONTINUED | OUTPATIENT
Start: 2021-05-27 | End: 2021-05-28 | Stop reason: HOSPADM

## 2021-05-27 RX ORDER — OXYCODONE HYDROCHLORIDE AND ACETAMINOPHEN 5; 325 MG/1; MG/1
2 TABLET ORAL EVERY 4 HOURS PRN
Qty: 20 TABLET | Refills: 0 | Status: SHIPPED | OUTPATIENT
Start: 2021-05-27 | End: 2021-05-28 | Stop reason: SDUPTHER

## 2021-05-27 RX ORDER — NALOXONE HYDROCHLORIDE 4 MG/.1ML
1 SPRAY NASAL PRN
Qty: 1 EACH | Refills: 5 | Status: SHIPPED | OUTPATIENT
Start: 2021-05-27 | End: 2021-10-05 | Stop reason: SDUPTHER

## 2021-05-27 RX ORDER — SIMETHICONE 80 MG
80 TABLET,CHEWABLE ORAL EVERY 6 HOURS PRN
Qty: 180 TABLET | Refills: 3 | Status: SHIPPED | OUTPATIENT
Start: 2021-05-27 | End: 2021-10-05 | Stop reason: SDUPTHER

## 2021-05-27 RX ORDER — 0.9 % SODIUM CHLORIDE 0.9 %
80 INTRAVENOUS SOLUTION INTRAVENOUS ONCE
Status: COMPLETED | OUTPATIENT
Start: 2021-05-27 | End: 2021-05-27

## 2021-05-27 RX ORDER — LEVOFLOXACIN 750 MG/1
750 TABLET ORAL DAILY
Qty: 7 TABLET | Refills: 0 | Status: SHIPPED | OUTPATIENT
Start: 2021-05-28 | End: 2021-06-04

## 2021-05-27 RX ORDER — FERROUS SULFATE 325(65) MG
325 TABLET ORAL 2 TIMES DAILY
Qty: 180 TABLET | Refills: 1 | Status: SHIPPED | OUTPATIENT
Start: 2021-05-27 | End: 2021-10-05 | Stop reason: SDUPTHER

## 2021-05-27 RX ORDER — POLYETHYLENE GLYCOL 3350 17 G/17G
17 POWDER, FOR SOLUTION ORAL DAILY
Status: DISCONTINUED | OUTPATIENT
Start: 2021-05-27 | End: 2021-05-28 | Stop reason: HOSPADM

## 2021-05-27 RX ADMIN — ALUMINUM HYDROXIDE, MAGNESIUM HYDROXIDE, AND SIMETHICONE 30 ML: 200; 200; 20 SUSPENSION ORAL at 22:07

## 2021-05-27 RX ADMIN — AMITRIPTYLINE HYDROCHLORIDE 75 MG: 50 TABLET, FILM COATED ORAL at 21:50

## 2021-05-27 RX ADMIN — OXYCODONE AND ACETAMINOPHEN 1 TABLET: 5; 325 TABLET ORAL at 03:34

## 2021-05-27 RX ADMIN — INSULIN LISPRO 2 UNITS: 100 INJECTION, SOLUTION INTRAVENOUS; SUBCUTANEOUS at 12:21

## 2021-05-27 RX ADMIN — SODIUM CHLORIDE 80 ML: 9 INJECTION, SOLUTION INTRAVENOUS at 13:43

## 2021-05-27 RX ADMIN — ALUMINUM HYDROXIDE, MAGNESIUM HYDROXIDE, AND SIMETHICONE 30 ML: 200; 200; 20 SUSPENSION ORAL at 03:31

## 2021-05-27 RX ADMIN — PROBIOTIC PRODUCT - TAB 1 TABLET: TAB at 10:22

## 2021-05-27 RX ADMIN — POLYETHYLENE GLYCOL 3350 17 G: 17 POWDER, FOR SOLUTION ORAL at 17:10

## 2021-05-27 RX ADMIN — METFORMIN HYDROCHLORIDE 500 MG: 500 TABLET ORAL at 17:10

## 2021-05-27 RX ADMIN — SODIUM CHLORIDE, PRESERVATIVE FREE 10 ML: 5 INJECTION INTRAVENOUS at 13:43

## 2021-05-27 RX ADMIN — INSULIN LISPRO 4 UNITS: 100 INJECTION, SOLUTION INTRAVENOUS; SUBCUTANEOUS at 17:10

## 2021-05-27 RX ADMIN — MORPHINE SULFATE 4 MG: 4 INJECTION, SOLUTION INTRAMUSCULAR; INTRAVENOUS at 00:13

## 2021-05-27 RX ADMIN — IOPAMIDOL 75 ML: 755 INJECTION, SOLUTION INTRAVENOUS at 13:43

## 2021-05-27 RX ADMIN — SODIUM CHLORIDE, PRESERVATIVE FREE 10 ML: 5 INJECTION INTRAVENOUS at 21:51

## 2021-05-27 RX ADMIN — APIXABAN 5 MG: 5 TABLET, FILM COATED ORAL at 21:51

## 2021-05-27 RX ADMIN — ALUMINUM HYDROXIDE, MAGNESIUM HYDROXIDE, AND SIMETHICONE 30 ML: 200; 200; 20 SUSPENSION ORAL at 17:10

## 2021-05-27 RX ADMIN — OXYCODONE AND ACETAMINOPHEN 1 TABLET: 5; 325 TABLET ORAL at 10:22

## 2021-05-27 RX ADMIN — ONDANSETRON 4 MG: 2 INJECTION INTRAMUSCULAR; INTRAVENOUS at 03:34

## 2021-05-27 RX ADMIN — LEVOFLOXACIN 750 MG: 500 TABLET, FILM COATED ORAL at 10:22

## 2021-05-27 RX ADMIN — METFORMIN HYDROCHLORIDE 500 MG: 500 TABLET ORAL at 10:22

## 2021-05-27 RX ADMIN — APIXABAN 5 MG: 5 TABLET, FILM COATED ORAL at 10:22

## 2021-05-27 RX ADMIN — INSULIN LISPRO 2 UNITS: 100 INJECTION, SOLUTION INTRAVENOUS; SUBCUTANEOUS at 10:23

## 2021-05-27 RX ADMIN — OXYCODONE AND ACETAMINOPHEN 1 TABLET: 5; 325 TABLET ORAL at 19:29

## 2021-05-27 RX ADMIN — PROBIOTIC PRODUCT - TAB 1 TABLET: TAB at 21:50

## 2021-05-27 RX ADMIN — OXYCODONE AND ACETAMINOPHEN 1 TABLET: 5; 325 TABLET ORAL at 15:38

## 2021-05-27 RX ADMIN — INSULIN GLARGINE 25 UNITS: 100 INJECTION, SOLUTION SUBCUTANEOUS at 17:11

## 2021-05-27 RX ADMIN — IRON SUCROSE 300 MG: 20 INJECTION, SOLUTION INTRAVENOUS at 10:25

## 2021-05-27 RX ADMIN — BUDESONIDE AND FORMOTEROL FUMARATE DIHYDRATE 2 PUFF: 160; 4.5 AEROSOL RESPIRATORY (INHALATION) at 08:58

## 2021-05-27 RX ADMIN — ZOLPIDEM TARTRATE 5 MG: 5 TABLET ORAL at 21:51

## 2021-05-27 RX ADMIN — MORPHINE SULFATE 4 MG: 4 INJECTION, SOLUTION INTRAMUSCULAR; INTRAVENOUS at 21:50

## 2021-05-27 RX ADMIN — SODIUM CHLORIDE, PRESERVATIVE FREE 10 ML: 5 INJECTION INTRAVENOUS at 10:25

## 2021-05-27 RX ADMIN — TAMSULOSIN HYDROCHLORIDE 0.4 MG: 0.4 CAPSULE ORAL at 10:22

## 2021-05-27 ASSESSMENT — PAIN SCALES - GENERAL
PAINLEVEL_OUTOF10: 5
PAINLEVEL_OUTOF10: 8
PAINLEVEL_OUTOF10: 7
PAINLEVEL_OUTOF10: 8
PAINLEVEL_OUTOF10: 4
PAINLEVEL_OUTOF10: 7
PAINLEVEL_OUTOF10: 8

## 2021-05-27 ASSESSMENT — PAIN DESCRIPTION - PROGRESSION
CLINICAL_PROGRESSION: NOT CHANGED

## 2021-05-27 ASSESSMENT — PAIN DESCRIPTION - PAIN TYPE
TYPE: CHRONIC PAIN
TYPE: CHRONIC PAIN;SURGICAL PAIN

## 2021-05-27 ASSESSMENT — ENCOUNTER SYMPTOMS
COLOR CHANGE: 0
NAUSEA: 0
SHORTNESS OF BREATH: 0
ABDOMINAL PAIN: 0
DIARRHEA: 0
WHEEZING: 0
SINUS PRESSURE: 0
ABDOMINAL DISTENTION: 0
PHOTOPHOBIA: 0
SINUS PAIN: 0

## 2021-05-27 ASSESSMENT — PAIN DESCRIPTION - FREQUENCY
FREQUENCY: CONTINUOUS
FREQUENCY: CONTINUOUS
FREQUENCY: INTERMITTENT
FREQUENCY: CONTINUOUS
FREQUENCY: CONTINUOUS

## 2021-05-27 ASSESSMENT — PAIN DESCRIPTION - LOCATION
LOCATION: GENERALIZED
LOCATION: GENERALIZED
LOCATION: LEG

## 2021-05-27 ASSESSMENT — PAIN DESCRIPTION - DESCRIPTORS
DESCRIPTORS: ACHING;CONSTANT;DISCOMFORT
DESCRIPTORS: ACHING;DISCOMFORT

## 2021-05-27 ASSESSMENT — PAIN DESCRIPTION - ORIENTATION
ORIENTATION: LEFT
ORIENTATION: LEFT;LOWER
ORIENTATION: LEFT

## 2021-05-27 ASSESSMENT — PAIN - FUNCTIONAL ASSESSMENT
PAIN_FUNCTIONAL_ASSESSMENT: PREVENTS OR INTERFERES SOME ACTIVE ACTIVITIES AND ADLS

## 2021-05-27 ASSESSMENT — PAIN DESCRIPTION - ONSET
ONSET: ON-GOING

## 2021-05-27 NOTE — PROGRESS NOTES
Learning About the Safe Use of Antibiotics  Introduction  Antibiotics are drugs used to kill bacteria. Bacteria can cause infections. These include strep throat, ear infections, and pneumonia. These medicines can't cure everything. They don't kill viruses or help with allergies. They don't help illnesses such as the common cold, the flu, or a runny nose. And they can cause side effects. There are many types of antibiotics. Your doctor will decide which one will work best for your infection. Examples include:  · Amoxicillin. · Cephalexin (Keflex). · Ciprofloxacin (Cipro). What are the possible side effects? Side effects can include:  · Nausea. · Diarrhea. · Skin rash. · Yeast infection. · A severe allergic reaction. It may cause itching, swelling, and breathing problems. This is rare. You may have other side effects or reactions not listed here. Check the information that comes with your medicine. Should you take antibiotics just in case? Don't take antibiotics when you don't need them. If you do that, they may not work when you do need them. Each time you take antibiotics, you are more likely to have some bacteria that survive and aren't killed by the medicine. Bacteria that don't die can change and become even harder to kill. These are called antibiotic-resistant bacteria. They can cause longer and more serious infections. To treat them, you may need different, stronger antibiotics that have more side effects and may cost more. So always ask your doctor if antibiotics are the best treatment. Explain that you do not want antibiotics unless you need them. Help protect the community  Using antibiotics when they're not needed leads to the development of antibiotic-resistant bacteria. These tougher bacteria can spread to family members, children, and coworkers. People in your community will have a risk of getting an infection that is harder to cure and that costs more to treat.   How can you take antibiotics safely? Be safe with medicine. Take your antibiotics as directed. Do not stop taking them just because you feel better. You need to take the full course of medicine. This will help make sure your infection is cured. It will also help prevent the growth of antibiotic-resistant bacteria. Always take the exact amount that the label says to take. If the label says to take the medicine at a certain time, follow those directions. You might feel better after you take an antibiotic for a few days. But it is important to keep taking it for as long as prescribed. That will help you get rid of those bacteria that are a bit stronger and that survive the first few days of treatment. Where can you learn more? Go to https://[employer] org and sign in to your [employer] account. Enter W176 in the [employer] box to learn more about \"Learning About the Safe Use of Antibiotics. \"     If you do not have an account, please click on the \"Sign Up Now\" link. Current as of: March 3, 2017  Content Version: 11.3  © 1785-6715 [employer]. Care instructions adapted under license by [facility] ([facility]). If you have questions about a medical condition or this instruction, always ask your healthcare professional. [place] [address] any warranty or liability for your use of this information. Antibiotics are powerful drugs that are generally safe and very helpful in fighting disease, but there are times when antibiotics can actually be harmful. Antibiotics can have side effects, including allergic reactions and a potentially deadly diarrhea caused by the bacteria Clostridium difficile (C. diff). Antibiotics can also interfere with the action of other drugs a patient may be taking for another condition. These unintended reactions to antibiotics are called adverse drug events.    When someone takes an antibiotic that they do not need, they are needlessly exposed to the side effects of the drug and do not get any benefit from it. Moreover, taking an antibiotic when it is not needed can lead to the development of antibiotic resistance. When resistance develops, antibiotics may not be able to stop future infections. Every time someone takes an antibiotic they dont need, they increase their risk of developing a resistant infection in the future. Types of Adverse Drug Events Related to Antibiotics  Allergic Reactions  Every year, there are more than 140,000 emergency department visits for reactions to antibiotics. Almost four out of five (79%) emergency department visits for antibiotic-related adverse drug events are due to an allergic reaction. These reactions can range from mild rashes and itching to serious blistering skin reactions swelling of the face and throat, and breathing problems. Minimizing unnecessary antibiotic use is the best way to reduce the risk of adverse drug events from antibiotics. Patients should tell their doctors about any past drug reactions or allergies. C. difficile  C. difficile causes diarrhea linked to at least 14,000 [place] deaths each year. When a person takes antibiotics, good bacteria that protect against infection are destroyed for several months. During this time, patients can get sick from C. difficile picked up from contaminated surfaces or spread from a healthcare providers hands. Those most at risk are people, especially older adults, who take antibiotics and also get medical care. Take antibiotics exactly and only as prescribed. Drug Interactions and Side Effects  Antibiotics can interact with other drugs patients take, making those drugs or the antibiotics less effective. Some drug combinations can worsen the side effects of the antibiotic or other drug. Common side effects of antibiotics include nausea, diarrhea, and stomach pain. Sometimes these symptoms can lead to dehydration and other problems.  Patients should ask their doctors about drug interactions and the potential side effects of antibiotics.  The doctor should be told immediately if a patient has any side effects from antibiotics  Page last updated: February 24, 2017 Content source:   Centers for Disease Control and [employer] for Emerging and Zoonotic Infectious Diseases ([place])  Division of Healthcare Quality Promotion [facility], [facility]
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
## 2021-05-27 NOTE — PLAN OF CARE
Problem: Falls - Risk of:  Goal: Will remain free from falls  Description: Will remain free from falls  5/27/2021 0234 by Randi Avalos RN  Outcome: Ongoing  5/26/2021 1517 by Yvette Capps RN  Outcome: Ongoing  Goal: Absence of physical injury  Description: Absence of physical injury  5/26/2021 1517 by Yvette Capps RN  Outcome: Ongoing     Problem: Skin Integrity:  Goal: Will show no infection signs and symptoms  Description: Will show no infection signs and symptoms  5/27/2021 0234 by Randi Avalos RN  Outcome: Ongoing  5/26/2021 1517 by Yvette Capps RN  Outcome: Ongoing  Goal: Absence of new skin breakdown  Description: Absence of new skin breakdown  5/26/2021 1517 by Yvette Capps RN  Outcome: Ongoing     Problem: Pain:  Goal: Pain level will decrease  Description: Pain level will decrease  5/27/2021 0234 by Randi Avalos RN  Outcome: Ongoing  5/26/2021 1517 by Yvette Capps RN  Outcome: Ongoing  Goal: Control of acute pain  Description: Control of acute pain  5/26/2021 1517 by Yvette Capps RN  Outcome: Ongoing  Goal: Control of chronic pain  Description: Control of chronic pain  5/26/2021 1517 by Yvette Capps RN  Outcome: Ongoing     Problem: Airway Clearance - Ineffective:  Goal: Clear lung sounds  Description: Clear lung sounds  5/27/2021 0234 by Randi Avalos RN  Outcome: Ongoing  5/26/2021 1517 by Yvette Capps RN  Outcome: Ongoing  Goal: Ability to maintain a clear airway will improve  Description: Ability to maintain a clear airway will improve  5/27/2021 0234 by Randi Avalos RN  Outcome: Ongoing  5/26/2021 1517 by Yvette Capps RN  Outcome: Ongoing     Problem: IP BALANCE  Goal: LTG - Patient will maintain balance to allow for safe/functional mobility  5/26/2021 1517 by Yvette Capps RN  Outcome: Ongoing     Problem: Discharge Planning:  Goal: Discharged to appropriate level of care  Description: Discharged to appropriate level of care  5/27/2021 0234 by Randi Avalos

## 2021-05-27 NOTE — CARE COORDINATION
Social work; snf had already submitted precert request to insurance. No answer yet. Called central intake Darwin Dowell [phone] and she will check with insurance to see if they have made a decision. Pt has [employer]. precert is pending. Will need philip and Rx at discharge. No new hens is needed. Await precert. Rufino camacho    No response from insurance yet.   Will check in am.  Rufino camacho

## 2021-05-27 NOTE — PLAN OF CARE
Problem: Falls - Risk of:  Goal: Will remain free from falls  Description: Will remain free from falls  Outcome: Ongoing  Goal: Absence of physical injury  Description: Absence of physical injury  Outcome: Ongoing     Problem: Skin Integrity:  Goal: Will show no infection signs and symptoms  Description: Will show no infection signs and symptoms  Outcome: Ongoing  Goal: Absence of new skin breakdown  Description: Absence of new skin breakdown  Outcome: Ongoing     Problem: Pain:  Goal: Pain level will decrease  Description: Pain level will decrease  Outcome: Ongoing  Goal: Control of acute pain  Description: Control of acute pain  Outcome: Ongoing  Goal: Control of chronic pain  Description: Control of chronic pain  Outcome: Ongoing     Problem: Discharge Planning:  Goal: Discharged to appropriate level of care  Description: Discharged to appropriate level of care  Outcome: Ongoing     Problem: Sensory Perception - Impaired:  Goal: Ability to maintain a stable neurologic state will improve  Description: Ability to maintain a stable neurologic state will improve  Outcome: Ongoing     Problem: Nutrition  Goal: Optimal nutrition therapy  Outcome: Ongoing     Problem: ABCDS Injury Assessment  Goal: Absence of physical injury  Outcome: Ongoing

## 2021-05-27 NOTE — PROGRESS NOTES
[facility]    Gastroenterology Daily Progress Note      Patient:   Aris Jung   :    1955   Facility:   [facility]   Date:     2021  Consultant:   MARCELO Medina CNP, CNP      SUBJECTIVE  63 y.o. male admitted 2021 with Pleural effusion associated with pulmonary infection [J18.9, J91.8] and seen for abdominal pain with bloating. The pt was seen and examined. No BM for 2 days, no c/o abdominal pain right now. Creat is now normal and abd ct has been ordered.          OBJECTIVE  Scheduled Meds:   iron sucrose  300 mg Intravenous Q24H    amitriptyline  75 mg Oral Nightly    apixaban  5 mg Oral BID    budesonide-formoterol  2 puff Inhalation BID    insulin glargine  25 Units Subcutaneous Dinner    lactobacillus  1 tablet Oral BID    metFORMIN  500 mg Oral BID WC    tamsulosin  0.4 mg Oral Daily    sodium chloride flush  5-40 mL Intravenous 2 times per day    levoFLOXacin  750 mg Oral Daily    [Held by provider] furosemide  20 mg Intravenous BID    insulin lispro  0-12 Units Subcutaneous TID WC    insulin lispro  0-6 Units Subcutaneous Nightly       Vital Signs:  /84   Pulse 97   Temp 97.8 °F (36.6 °C) (Oral)   Resp 18   Ht 6' 1\" (1.854 m)   Wt 166 lb 7.2 oz (75.5 kg)   SpO2 92%   BMI 21.96 kg/m²      Physical Exam:     General Appearance: alert and oriented to person, place and time, well-developed and well-nourished, in no acute distress  Skin: warm and dry, no rash or erythema  Head: normocephalic and atraumatic  Eyes: pupils equal, round, and reactive to light, extraocular eye movements intact, conjunctivae normal  ENT: hearing grossly normal bilaterally  Neck: neck supple and non tender without mass, no thyromegaly or thyroid nodules, no cervical lymphadenopathy   Pulmonary/Chest: clear to auscultation bilaterally- no wheezes, rales or rhonchi, normal air movement, no respiratory distress  Cardiovascular: normal rate, regular

## 2021-05-27 NOTE — PROGRESS NOTES
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 Patient independent. No further needs. [] PT being discontinued at this time as the patient has been transferred to hospice care. No further needs.     Vanessa Castanon, PTA

## 2021-05-27 NOTE — CARE COORDINATION
Social work: will advise [facility] snf that pt may be able to return soon. [facility] [phone] fax [phone]. Will need philip and Rx at discharge. No new hens needed as pt is a return.   Dakota camacho

## 2021-05-27 NOTE — PROGRESS NOTES
Physician Progress Note      PATIENT:               Denae Latif  CSN #:                  [id number]  :                       1955  ADMIT DATE:       2021 9:24 AM  DISCH DATE:  RESPONDING  PROVIDER #:        Edin Molina NP          QUERY TEXT:    Pt admitted with abdominal pain  and has anemia documented as normochromic   which codes to unspecified anemia . If possible, please document in progress   notes and discharge summary further specificity regarding the acuity and type   of anemia:      The medical record reflects the following:  Risk Factors: age 62 yo PMH: of cancer of the esophagus  Clinical Indicators:Anemia, normocytic normochromic  codes to unspecified   anemia;  HGB  8,  7.9  7.5, Fatigue  Treatment: Lab monitoring , Iron replacement  Thank you,  Brad Dang, MSN, CDS  Options provided:  -- Anemia due to acute on chronic blood loss  -- Anemia due to iron deficiency  -- Anemia due to  -- Other - I will add my own diagnosis  -- Disagree - Not applicable / Not valid  -- Disagree - Clinically unable to determine / Unknown  -- Refer to Clinical Documentation Reviewer    PROVIDER RESPONSE TEXT:    This patient has iron deficiency anemia.     Query created by: Lupe Lopez on 2021 9:48 AM      Electronically signed by:  Edin Molina NP 2021 9:55 AM

## 2021-05-27 NOTE — PROGRESS NOTES
Occupational [address]  Occupational Therapy Not Seen Note    Patient not available for Occupational Therapy due to:    [] Testing:    [] Hemodialysis    [] Cancelled by RN:    [x]Refusal by Patient:5/27: (CX) Patient refused OT today stating, \"Not now, I have not slept, I sold my house and everything is a mess, I don't have time for this. \" Will try back later as able.    [] Surgery:     [] Intubation:     [] Pain Medication:    [] Sedation:     [] Spine Precautions :    [] Medical Instability:    [] Other:    Ivone Ingram, OT

## 2021-05-28 VITALS
OXYGEN SATURATION: 93 % | BODY MASS INDEX: 21.87 KG/M2 | HEIGHT: 73 IN | TEMPERATURE: 98.1 F | DIASTOLIC BLOOD PRESSURE: 81 MMHG | WEIGHT: 165 LBS | SYSTOLIC BLOOD PRESSURE: 97 MMHG | RESPIRATION RATE: 18 BRPM | HEART RATE: 100 BPM

## 2021-05-28 LAB
ANION GAP SERPL CALCULATED.3IONS-SCNC: 8 MMOL/L (ref 9–17)
BUN BLDV-MCNC: 28 MG/DL (ref 8–23)
BUN/CREAT BLD: 23 (ref 9–20)
CALCIUM SERPL-MCNC: 8.9 MG/DL (ref 8.6–10.4)
CHLORIDE BLD-SCNC: 100 MMOL/L (ref 98–107)
CO2: 27 MMOL/L (ref 20–31)
CREAT SERPL-MCNC: 1.22 MG/DL (ref 0.7–1.2)
GFR AFRICAN AMERICAN: >60 ML/MIN
GFR NON-AFRICAN AMERICAN: 60 ML/MIN
GFR SERPL CREATININE-BSD FRML MDRD: ABNORMAL ML/MIN/{1.73_M2}
GFR SERPL CREATININE-BSD FRML MDRD: ABNORMAL ML/MIN/{1.73_M2}
GLUCOSE BLD-MCNC: 170 MG/DL (ref 75–110)
GLUCOSE BLD-MCNC: 192 MG/DL (ref 75–110)
GLUCOSE BLD-MCNC: 208 MG/DL (ref 70–99)
HCT VFR BLD CALC: 29.4 % (ref 40.7–50.3)
HEMOGLOBIN: 8.1 G/DL (ref 13–17)
LACTIC ACID, SEPSIS WHOLE BLOOD: NORMAL MMOL/L (ref 0.5–1.9)
LACTIC ACID, SEPSIS: 1.5 MMOL/L (ref 0.5–1.9)
MCH RBC QN AUTO: 23 PG (ref 25.2–33.5)
MCHC RBC AUTO-ENTMCNC: 27.6 G/DL (ref 28.4–34.8)
MCV RBC AUTO: 83.5 FL (ref 82.6–102.9)
NRBC AUTOMATED: 0 PER 100 WBC
PDW BLD-RTO: 17.2 % (ref 11.8–14.4)
PLATELET # BLD: 437 K/UL (ref 138–453)
PMV BLD AUTO: 8.6 FL (ref 8.1–13.5)
POTASSIUM SERPL-SCNC: 4.6 MMOL/L (ref 3.7–5.3)
RBC # BLD: 3.52 M/UL (ref 4.21–5.77)
SODIUM BLD-SCNC: 135 MMOL/L (ref 135–144)
WBC # BLD: 9.5 K/UL (ref 3.5–11.3)

## 2021-05-28 PROCEDURE — 80048 BASIC METABOLIC PNL TOTAL CA: CPT

## 2021-05-28 PROCEDURE — 6370000000 HC RX 637 (ALT 250 FOR IP): Performed by: INTERNAL MEDICINE

## 2021-05-28 PROCEDURE — 94761 N-INVAS EAR/PLS OXIMETRY MLT: CPT

## 2021-05-28 PROCEDURE — 6370000000 HC RX 637 (ALT 250 FOR IP): Performed by: NURSE PRACTITIONER

## 2021-05-28 PROCEDURE — 85027 COMPLETE CBC AUTOMATED: CPT

## 2021-05-28 PROCEDURE — 87040 BLOOD CULTURE FOR BACTERIA: CPT

## 2021-05-28 PROCEDURE — 83605 ASSAY OF LACTIC ACID: CPT

## 2021-05-28 PROCEDURE — 99239 HOSP IP/OBS DSCHRG MGMT >30: CPT | Performed by: INTERNAL MEDICINE

## 2021-05-28 PROCEDURE — 82947 ASSAY GLUCOSE BLOOD QUANT: CPT

## 2021-05-28 PROCEDURE — 36415 COLL VENOUS BLD VENIPUNCTURE: CPT

## 2021-05-28 PROCEDURE — 2700000000 HC OXYGEN THERAPY PER DAY

## 2021-05-28 PROCEDURE — 94640 AIRWAY INHALATION TREATMENT: CPT

## 2021-05-28 RX ORDER — ZOLPIDEM TARTRATE 5 MG/1
5 TABLET ORAL NIGHTLY PRN
Qty: 5 TABLET | Refills: 0 | Status: SHIPPED | OUTPATIENT
Start: 2021-05-28 | End: 2021-06-01

## 2021-05-28 RX ORDER — BISACODYL 10 MG
10 SUPPOSITORY, RECTAL RECTAL ONCE
Status: COMPLETED | OUTPATIENT
Start: 2021-05-28 | End: 2021-05-28

## 2021-05-28 RX ORDER — OXYCODONE HYDROCHLORIDE AND ACETAMINOPHEN 5; 325 MG/1; MG/1
1 TABLET ORAL EVERY 6 HOURS PRN
Qty: 6 TABLET | Refills: 0 | Status: SHIPPED | OUTPATIENT
Start: 2021-05-28 | End: 2021-06-01

## 2021-05-28 RX ADMIN — INSULIN LISPRO 2 UNITS: 100 INJECTION, SOLUTION INTRAVENOUS; SUBCUTANEOUS at 12:28

## 2021-05-28 RX ADMIN — METFORMIN HYDROCHLORIDE 500 MG: 500 TABLET ORAL at 07:40

## 2021-05-28 RX ADMIN — APIXABAN 5 MG: 5 TABLET, FILM COATED ORAL at 07:40

## 2021-05-28 RX ADMIN — LEVOFLOXACIN 750 MG: 500 TABLET, FILM COATED ORAL at 07:40

## 2021-05-28 RX ADMIN — BUDESONIDE AND FORMOTEROL FUMARATE DIHYDRATE 2 PUFF: 160; 4.5 AEROSOL RESPIRATORY (INHALATION) at 09:40

## 2021-05-28 RX ADMIN — OXYCODONE AND ACETAMINOPHEN 1 TABLET: 5; 325 TABLET ORAL at 07:40

## 2021-05-28 RX ADMIN — POLYETHYLENE GLYCOL 3350 17 G: 17 POWDER, FOR SOLUTION ORAL at 07:40

## 2021-05-28 RX ADMIN — BISACODYL 10 MG: 10 SUPPOSITORY RECTAL at 11:30

## 2021-05-28 RX ADMIN — BISACODYL 5 MG: 5 TABLET, COATED ORAL at 13:13

## 2021-05-28 RX ADMIN — INSULIN LISPRO 2 UNITS: 100 INJECTION, SOLUTION INTRAVENOUS; SUBCUTANEOUS at 07:41

## 2021-05-28 RX ADMIN — PROBIOTIC PRODUCT - TAB 1 TABLET: TAB at 07:39

## 2021-05-28 RX ADMIN — TAMSULOSIN HYDROCHLORIDE 0.4 MG: 0.4 CAPSULE ORAL at 07:40

## 2021-05-28 ASSESSMENT — PAIN DESCRIPTION - PROGRESSION: CLINICAL_PROGRESSION: NOT CHANGED

## 2021-05-28 ASSESSMENT — PAIN DESCRIPTION - LOCATION: LOCATION: GENERALIZED

## 2021-05-28 ASSESSMENT — PAIN DESCRIPTION - DESCRIPTORS: DESCRIPTORS: ACHING;DISCOMFORT

## 2021-05-28 ASSESSMENT — PAIN SCALES - GENERAL
PAINLEVEL_OUTOF10: 6
PAINLEVEL_OUTOF10: 8

## 2021-05-28 ASSESSMENT — PAIN DESCRIPTION - PAIN TYPE: TYPE: CHRONIC PAIN

## 2021-05-28 ASSESSMENT — PAIN DESCRIPTION - FREQUENCY: FREQUENCY: CONTINUOUS

## 2021-05-28 ASSESSMENT — PAIN DESCRIPTION - ONSET: ONSET: ON-GOING

## 2021-05-28 ASSESSMENT — PAIN - FUNCTIONAL ASSESSMENT: PAIN_FUNCTIONAL_ASSESSMENT: PREVENTS OR INTERFERES SOME ACTIVE ACTIVITIES AND ADLS

## 2021-05-28 NOTE — CARE COORDINATION
Social work spoke to daughter Marques Marroquin to inform of transfer back to [facility].   Frida Sotelo w

## 2021-05-28 NOTE — PLAN OF CARE
Problem: Falls - Risk of:  Goal: Will remain free from falls  Description: Will remain free from falls  5/28/2021 1114 by Kirby Godoy RN  Outcome: Ongoing     Problem: Falls - Risk of:  Goal: Absence of physical injury  Description: Absence of physical injury  5/28/2021 1114 by Kirby Godoy RN  Outcome: Ongoing     Problem: Skin Integrity:  Goal: Will show no infection signs and symptoms  Description: Will show no infection signs and symptoms  5/28/2021 1114 by Kirby Godoy RN  Outcome: Ongoing     Problem: Skin Integrity:  Goal: Absence of new skin breakdown  Description: Absence of new skin breakdown  5/28/2021 1114 by Kirby Godoy RN  Outcome: Ongoing     Problem: Pain:  Goal: Control of acute pain  Description: Control of acute pain  5/28/2021 1114 by Kirby Godoy RN  Outcome: Ongoing     Problem: Pain:  Goal: Control of chronic pain  Description: Control of chronic pain  5/28/2021 1114 by Kirby Godoy RN  Outcome: Ongoing     Problem: Airway Clearance - Ineffective:  Goal: Clear lung sounds  Description: Clear lung sounds  5/28/2021 1114 by Kirby Godoy RN  Outcome: Ongoing     Problem: Airway Clearance - Ineffective:  Goal: Ability to maintain a clear airway will improve  Description: Ability to maintain a clear airway will improve  5/28/2021 1114 by Kirby Godoy RN  Outcome: Ongoing     Problem: Discharge Planning:  Goal: Discharged to appropriate level of care  Description: Discharged to appropriate level of care  5/28/2021 1114 by Kirby Godoy RN  Outcome: Ongoing     Problem: Nutrition  Goal: Optimal nutrition therapy  5/28/2021 1114 by Kirby Godoy RN  Outcome: Ongoing     Problem: ABCDS Injury Assessment  Goal: Absence of physical injury  5/28/2021 1114 by Kirby Godoy RN  Outcome: Ongoing None

## 2021-05-28 NOTE — PROGRESS NOTES
Pt discharged to [facility] in stable condition with belongings  Pt denies having any further questions at this time  Patient/family state they have everything they were admitted with.

## 2021-05-28 NOTE — CARE COORDINATION
Social work: got insurance [facility]. Will get stretcher. Return to [place] [facility].   Jean Carlos camacho

## 2021-05-28 NOTE — PLAN OF CARE
Lyle Cid RN  Outcome: Ongoing     Problem: Nutrition  Goal: Optimal nutrition therapy  5/27/2021 2344 by Shaneka Barillas RN  Outcome: Ongoing    Problem: ABCDS Injury Assessment  Goal: Absence of physical injury  5/27/2021 2344 by Shaneka Barillas, RN  Outcome: Ongoing

## 2021-05-28 NOTE — DISCHARGE SUMMARY
[facility]  Office: [address], DO, Gonzalo Rin, DO, Rosey Tate, DO, Rusty Chiu, DO, Arti Ley MD, Hanna Polanco MD, Octavio Wallace MD, Willette Klinefelter, MD, Makeda Taylor MD, Keyon Monte MD, Elly Schmitt MD, Deric Mckenzie MD, Rayo White, DO, Lindsey Gibbs MD, Diane Almonte DO, Tj Palacios MD,  Donato Rosario DO, Maximo Bautista MD, Jamar Patel MD, Kalie Castillo MD, Elizabeth White MD, Rena Oliva, [facility], [facility], CNP, Alin Hemp, CNP, Yuliet Lancaster, CNS, Sita Morrison, CNP, Evi Monzon, CNP, Tracy Berman, CNP, Allen Rocha, CNP, Brea Davidson, CNP, Mitch Red PA-C, Narendra Hylton, [facility], Karine Villa, CNP, Candace Bond, CNP, Jovany Underwood, CNP, Marland Cowden, CNP, Kerri España, CNP, [facility]    Discharge Summary     Patient ID: Marko Leyva  :  1955   MRN: [id number]     ACCOUNT:  [de-identified]   Patient's PCP: John Coto DO  Admit Date: 2021   Discharge Date: 2021   Length of Stay: 3  Code Status:  Full Code  Admitting Physician: Messi Kessler MD  Discharge Physician: Messi Kessler MD     Active Discharge Diagnoses:     Hospital Problem Lists:  Principal Problem:    Pneumonia due to organism  Active Problems:    Abdominal pain    Parapneumonic effusion    Hiatus hernia -large    Type 2 diabetes mellitus with diabetic polyneuropathy, with long-term current use of insulin (HCC)    DM type 2 with diabetic peripheral neuropathy (HCC)    COPD without exacerbation ([facility]    History of esophageal cancer    S/P BKA (below knee amputation) bilateral (HCC)    Essential hypertension    Anemia, normocytic normochromic    Leg ulcer, left, with fat layer exposed ([facility]    Marijuana abuse  Resolved Problems:    * No resolved hospital problems.  *      Admission Condition:  poor     Discharged Condition: stable    Hospital Stay: 83.5 05/28/2021    MCH 23.0 05/28/2021    MCHC 27.6 05/28/2021    RDW 17.2 05/28/2021     05/28/2021     05/02/2012     BMP:    Lab Results   Component Value Date    GLUCOSE 208 05/28/2021    GLUCOSE 129 05/02/2012     05/28/2021    K 4.6 05/28/2021     05/28/2021    CO2 27 05/28/2021    ANIONGAP 8 05/28/2021    BUN 28 05/28/2021    CREATININE 1.22 05/28/2021    BUNCRER 23 05/28/2021    CALCIUM 8.9 05/28/2021    LABGLOM 60 05/28/2021    GFRAA >60 05/28/2021    GFR      05/28/2021    GFR NOT REPORTED 05/28/2021     HFP:    Lab Results   Component Value Date    PROT 6.6 05/25/2021     CMP:    Lab Results   Component Value Date    GLUCOSE 208 05/28/2021    GLUCOSE 129 05/02/2012     05/28/2021    K 4.6 05/28/2021     05/28/2021    CO2 27 05/28/2021    BUN 28 05/28/2021    CREATININE 1.22 05/28/2021    ANIONGAP 8 05/28/2021    ALKPHOS 170 05/25/2021    ALT 16 05/25/2021    AST 11 05/25/2021    BILITOT <0.10 05/25/2021    LABALBU 3.3 05/25/2021    LABALBU 4.4 03/05/2012    ALBUMIN NOT REPORTED 05/25/2021    LABGLOM 60 05/28/2021    GFRAA >60 05/28/2021    GFR      05/28/2021    GFR NOT REPORTED 05/28/2021    PROT 6.6 05/25/2021    CALCIUM 8.9 05/28/2021     PT/INR:    Lab Results   Component Value Date    PROTIME 14.4 05/26/2021    PROTIME 10.5 05/02/2012    INR 1.1 05/26/2021     PTT:   Lab Results   Component Value Date    APTT 34.5 02/09/2021     FLP:    Lab Results   Component Value Date    CHOL 174 06/24/2018    TRIG 175 06/24/2018    HDL 49 06/24/2018     U/A:    Lab Results   Component Value Date    COLORU YELLOW 03/05/2021    TURBIDITY CLEAR 03/05/2021    SPECGRAV 1.020 03/05/2021    HGBUR TRACE 03/05/2021    PHUR 6.0 03/05/2021    PROTEINU 2+ 03/05/2021    GLUCOSEU 3+ 03/05/2021    GLUCOSEU TRACE 03/05/2012    KETUA NEGATIVE 03/05/2021    BILIRUBINUR NEGATIVE 03/05/2021    BILIRUBINUR small 07/18/2016    BILIRUBINUR NEGATIVE 03/05/2012    UROBILINOGEN Normal 03/05/2021 NITRU NEGATIVE 03/05/2021    LEUKOCYTESUR NEGATIVE 03/05/2021     TSH:    Lab Results   Component Value Date    TSH 0.93 06/24/2018        Radiology:  XR ACUTE ABD SERIES CHEST 1 VW    Result Date: 5/25/2021  Bilateral effusions with bilateral infiltrates concerning for pneumonia. Nonobstructive bowel gas pattern. Right-sided PICC line with the tip in the mid SVC. CT ABDOMEN PELVIS W IV CONTRAST Additional Contrast? Radiologist Recommendation    Result Date: 5/27/2021  1. New bilateral pleural effusions, with interval worsening of the bibasilar airspace disease, and bibasilar atelectasis 2. Large hiatal hernia 3. Nonobstructive bowel gas pattern 4. Multiple bilateral non-obstructing intrarenal calculi again demonstrated     XR CHEST PORTABLE    Result Date: 5/26/2021  Mild pulmonary edema suspected. Stable small right and trace left pleural effusions as well as right basilar parenchymal opacity which could reflect atelectasis versus infiltrates. Consultations:    Consults:     Final Specialist Recommendations/Findings:   IP CONSULT TO INTERNAL MEDICINE  IP CONSULT TO GI      The patient was seen and examined on day of discharge and this discharge summary is in conjunction with any daily progress note from day of discharge.     Discharge plan:     Disposition: [facility]    Physician Follow Up:   PCP at SNF     Requiring Further Evaluation/Follow Up POST HOSPITALIZATION/Incidental Findings: May need repeat chest x-ray after 1 week if still symptomatic to check progress of pleural fluids which appeared to be parapneumonic pleural effusion    Diet: cardiac diet and diabetic diet    Activity: As tolerated    Instructions to Patient: Avoid using marijuana    Discharge Medications:      Medication List      START taking these medications    ferrous sulfate 325 (65 Fe) MG tablet  Commonly known as: IRON 325  Take 1 tablet by mouth 2 times daily     levoFLOXacin 750 MG tablet  Commonly known as: LEVAQUIN  Take 1 tablet by mouth daily for 7 days     naloxone 4 MG/0.1ML Liqd nasal spray  1 spray by Nasal route as needed for Opioid Reversal     simethicone 80 MG chewable tablet  Commonly known as: MYLICON  Take 1 tablet by mouth every 6 hours as needed for Flatulence     zolpidem 5 MG tablet  Commonly known as: AMBIEN  Take 1 tablet by mouth nightly as needed for Sleep for up to 5 doses. CHANGE how you take these medications    oxyCODONE-acetaminophen 5-325 MG per tablet  Commonly known as: PERCOCET  Take 1 tablet by mouth every 6 hours as needed for Pain for up to 6 doses.   What changed: when to take this     tamsulosin 0.4 MG capsule  Commonly known as: FLOMAX  Take 1 capsule by mouth daily  What changed: when to take this        CONTINUE taking these medications    aluminum & magnesium hydroxide-simethicone 200-200-20 MG/5ML Susp suspension  Commonly known as: MAALOX     amitriptyline 75 MG tablet  Commonly known as: ELAVIL  Take 1 tablet by mouth nightly     apixaban 5 MG Tabs tablet  Commonly known as: Eliquis  Take 1 tablet by mouth 2 times daily     bisacodyl 5 MG EC tablet  Commonly known as: DULCOLAX  Take 1 tablet by mouth daily as needed for Constipation     blood glucose test strips strip  Commonly known as: ASCENSIA AUTODISC VI;ONE TOUCH ULTRA TEST VI  Use with associated glucose meter to check fluctuating blood sugars BID     budesonide-formoterol 160-4.5 MCG/ACT Aero  Commonly known as: SYMBICORT  Inhale 2 puffs into the lungs 2 times daily     famotidine 20 MG tablet  Commonly known as: PEPCID     glucose 40 % Gel  Commonly known as: GLUTOSE  Take 37.5 mLs by mouth as needed (hypoglycemia)     glucose monitoring kit monitoring kit  1 kit by Does not apply route daily     hydrOXYzine 25 MG capsule  Commonly known as: VISTARIL     insulin glargine 100 UNIT/ML injection vial  Commonly known as: LANTUS  Inject 25 Units into the skin Daily with supper     * insulin lispro 100 UNIT/ML injection vial  Commonly known as: HUMALOG  Inject 0-6 Units into the skin 3 times daily (with meals)     * insulin lispro 100 UNIT/ML injection vial  Commonly known as: HUMALOG  Inject 0-3 Units into the skin nightly     ipratropium-albuterol 0.5-2.5 (3) MG/3ML Soln nebulizer solution  Commonly known as: DUONEB  Inhale 3 mLs into the lungs every 4 hours as needed for Shortness of Breath     lactobacillus Tabs  Take 1 tablet by mouth 2 times daily     Lancets Misc  1 each by Does not apply route 3 times daily     metFORMIN 500 MG tablet  Commonly known as: GLUCOPHAGE  Take 1 tablet by mouth 2 times daily (with meals)     ondansetron 4 MG tablet  Commonly known as: ZOFRAN     polyethylene glycol 17 g packet  Commonly known as: GLYCOLAX     senna 8.6 MG tablet  Commonly known as: SENOKOT     therapeutic multivitamin-minerals tablet     Ventolin  (90 Base) MCG/ACT inhaler  Generic drug: albuterol sulfate HFA         * This list has 2 medication(s) that are the same as other medications prescribed for you. Read the directions carefully, and ask your doctor or other care provider to review them with you. STOP taking these medications    sennosides-docusate sodium 8.6-50 MG tablet  Commonly known as: SENOKOT-S           Where to Get Your Medications      These medications were sent to [place] [address] [address], [address],  [address] - f [phone]  [address] [address]    Phone: [phone]   · ferrous sulfate 325 (65 Fe) MG tablet  · levoFLOXacin 750 MG tablet  · naloxone 4 MG/0.1ML Liqd nasal spray  · oxyCODONE-acetaminophen 5-325 MG per tablet  · simethicone 80 MG chewable tablet  · zolpidem 5 MG tablet         No discharge procedures on file. Time Spent on discharge is  35 mins in patient examination, evaluation, counseling as well as medication reconciliation, prescriptions for required medications, discharge plan and follow up.     Electronically signed by   Serenity Silva MD  5/28/2021  4:13 PM      Thank you Dr. Rafal Chicas DO for the opportunity to be involved in this patient's care.
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
## 2021-05-28 NOTE — PROGRESS NOTES
DATE: 2021    NAME: Angela Echevarria  MRN: [id number]   : 1955    Patient not seen this date for Physical Therapy due to:  [] Blood transfusion in progress  [] Cancel by RN  [] Hemodialysis  [x]  Refusal by Patient (Patient  States to busy organizing move)   [] Spine Precautions   [] Strict Bedrest  [] Surgery  [] Testing      [] Other        [] PT being discontinued at this time. Patient independent. No further needs. [] PT being discontinued at this time as the patient has been transferred to hospice care. No further needs.     Billy Hernández, PTA

## 2021-05-29 NOTE — CARE COORDINATION
[address] [place] [address] Update Call    2021    Patient: Jayde Brower Patient : 1955   MRN: [id number]  Reason for Admission: -2021 PN, Chronic LLE osteomyelitis.    Discharge Date: 21 RARS: Readmission Risk Score: 83  CT       Care Transitions Post Acute Facility Update    Care Transitions Interventions  Post Acute Facility: [facility] [address] [facility] Update

## 2021-06-03 LAB
CULTURE: NORMAL
Lab: NORMAL
SPECIMEN DESCRIPTION: NORMAL

## 2021-06-07 ENCOUNTER — HOSPITAL ENCOUNTER (OUTPATIENT)
Dept: WOUND CARE | Age: 66
Discharge: HOME | End: 2021-06-07
Payer: MEDICARE

## 2021-06-07 VITALS
RESPIRATION RATE: 18 BRPM | WEIGHT: 144 LBS | TEMPERATURE: 98.1 F | SYSTOLIC BLOOD PRESSURE: 100 MMHG | HEIGHT: 73 IN | HEART RATE: 60 BPM | DIASTOLIC BLOOD PRESSURE: 52 MMHG | BODY MASS INDEX: 19.09 KG/M2

## 2021-06-07 DIAGNOSIS — Z89.511 HISTORY OF BELOW KNEE AMPUTATION, RIGHT (HCC): ICD-10-CM

## 2021-06-07 DIAGNOSIS — I73.9 PAD (PERIPHERAL ARTERY DISEASE) (HCC): ICD-10-CM

## 2021-06-07 DIAGNOSIS — L97.922 LEG ULCER, LEFT, WITH FAT LAYER EXPOSED (HCC): ICD-10-CM

## 2021-06-07 DIAGNOSIS — Z89.512 HISTORY OF BELOW KNEE AMPUTATION, LEFT (HCC): Primary | ICD-10-CM

## 2021-06-07 DIAGNOSIS — E11.42 TYPE 2 DIABETES MELLITUS WITH DIABETIC POLYNEUROPATHY, WITH LONG-TERM CURRENT USE OF INSULIN (HCC): ICD-10-CM

## 2021-06-07 DIAGNOSIS — F17.200 TOBACCO DEPENDENCE: ICD-10-CM

## 2021-06-07 DIAGNOSIS — E44.0 MODERATE PROTEIN MALNUTRITION (HCC): ICD-10-CM

## 2021-06-07 DIAGNOSIS — Z79.4 TYPE 2 DIABETES MELLITUS WITH DIABETIC POLYNEUROPATHY, WITH LONG-TERM CURRENT USE OF INSULIN (HCC): ICD-10-CM

## 2021-06-07 PROCEDURE — 11042 DBRDMT SUBQ TIS 1ST 20SQCM/<: CPT | Performed by: NURSE PRACTITIONER

## 2021-06-07 PROCEDURE — 11042 DBRDMT SUBQ TIS 1ST 20SQCM/<: CPT

## 2021-06-07 RX ORDER — LIDOCAINE 40 MG/G
CREAM TOPICAL ONCE
Status: CANCELLED | OUTPATIENT
Start: 2021-06-07 | End: 2021-06-07

## 2021-06-07 RX ORDER — LIDOCAINE HYDROCHLORIDE 20 MG/ML
JELLY TOPICAL ONCE
Status: CANCELLED | OUTPATIENT
Start: 2021-06-07 | End: 2021-06-07

## 2021-06-07 RX ORDER — LIDOCAINE HYDROCHLORIDE 20 MG/ML
JELLY TOPICAL ONCE
Status: COMPLETED | OUTPATIENT
Start: 2021-06-07 | End: 2021-06-07

## 2021-06-07 RX ORDER — LIDOCAINE HYDROCHLORIDE 40 MG/ML
SOLUTION TOPICAL ONCE
Status: CANCELLED | OUTPATIENT
Start: 2021-06-07 | End: 2021-06-07

## 2021-06-07 RX ORDER — LIDOCAINE 50 MG/G
OINTMENT TOPICAL ONCE
Status: CANCELLED | OUTPATIENT
Start: 2021-06-07 | End: 2021-06-07

## 2021-06-07 RX ADMIN — LIDOCAINE HYDROCHLORIDE 6 ML: 20 JELLY TOPICAL at 13:33

## 2021-06-07 ASSESSMENT — PAIN SCALES - GENERAL: PAINLEVEL_OUTOF10: 3

## 2021-06-07 ASSESSMENT — PAIN DESCRIPTION - PAIN TYPE: TYPE: CHRONIC PAIN

## 2021-06-07 NOTE — PROGRESS NOTES
foot ([address] 7/31/2020    Pyogenic inflammation of bone ([address] 2/7/2021    Sepsis ([address] 2/3/2021    Sepsis due to methicillin resistant Staphylococcus aureus ([facility] 04/12/2021    Tobacco abuse        PAST SURGICAL HISTORY    Past Surgical History:   Procedure Laterality Date    COLONOSCOPY  05/11/2015    hyperplastic polyp    COLONOSCOPY  01/26/2017    ESOPHAGECTOMY      cancer    FOOT DEBRIDEMENT Left 1/1/2021    I&D LEFT FOOT WITH REMOVAL OF NONVIABLE BONE AND SOFT TISSUE performed by Jesús Menard DPM at [address] [place] [address] Left 1/5/2021    LEFT FOOT DEBRIDEMENT WITH REMOVAL ALL NON VIABLE SOFT TISSUE AND BONE performed by Jesús Menard DPM at [address] [place] [address] Right 11/03/2016    I & D heel    FOOT SURGERY Right 12/31/2016    I & D    FRACTURE SURGERY Left 9/5/2015    humerus left, left leg    IR INS PICC VAD W SQ PORT GREATER THAN 5  11/6/2020    IR INS PICC VAD W SQ PORT GREATER THAN 5 11/6/2020 Bertin Shields MD [facility]    IR INS PICC VAD W SQ PORT GREATER THAN 5  1/11/2021    IR INS PICC VAD W SQ PORT GREATER THAN 5 1/11/2021 MD [facility]    IR INS PICC VAD W SQ PORT GREATER THAN 5  4/8/2021    IR INS PICC VAD W SQ PORT GREATER THAN 5 4/8/2021 MD [facility]ED[facility]    LEG AMPUTATION BELOW KNEE Right 01/21/2017    LEG AMPUTATION BELOW KNEE Left 2/9/2021    LEFT  LEG AMPUTATION BELOW KNEE performed by Willian Elam MD at [place] [address] Left 4/6/2021    STUMP DEBRIDEMENT INCISION AND DRAINAGE performed by Willian Elam MD at [address] [place] [address] Right 2014    rt 3rd through 5th digits    TOE AMPUTATION Left 5/26/2016    left foot 5th toe    TOE AMPUTATION Left 8/5/2020    FOOT TRANSMETATARSAL  AMPUTATION - AND LEFT PECUTANEOUS TENDO ACHILLES LENGTHENING performed by Nona Contreras DPM at [address] [place] [address] Drive TOE AMPUTATION Left 8/24/2020    REVISION  TRANSMETATARSAL AMPUTATION WITH DEBRIDEMENT. performed by Niko Bautista DPM at  [facility]      13- with dilation    VASCULAR SURGERY Right 2017    foot guillotine amputation       FAMILY HISTORY    Family History   Problem Relation Age of Onset    Diabetes Mother     Cancer Mother     Alcohol Abuse Father     Cancer Sister     Alcohol Abuse Maternal Aunt     Alcohol Abuse Maternal Uncle     Alcohol Abuse Paternal Aunt        SOCIAL HISTORY    Social History     Tobacco Use    Smoking status: Former Smoker     Packs/day: 1.00     Years: 30.00     Pack years: 30.00     Types: Cigarettes     Quit date: 2020     Years since quittin.5    Smokeless tobacco: Former User     Types: Chew     Quit date:    Vaping Use    Vaping Use: Never used   Substance Use Topics    Alcohol use:  Yes     Alcohol/week: 0.0 standard drinks     Comment:  states occ    Drug use: Not Currently     Types: Marijuana       ALLERGIES    Allergies   Allergen Reactions    Gabapentin Other (See Comments)     dizziness    Other        MEDICATIONS    Current Outpatient Medications on File Prior to Encounter   Medication Sig Dispense Refill    simethicone (MYLICON) 80 MG chewable tablet Take 1 tablet by mouth every 6 hours as needed for Flatulence 180 tablet 3    ferrous sulfate (IRON 325) 325 (65 Fe) MG tablet Take 1 tablet by mouth 2 times daily 180 tablet 1    naloxone 4 MG/0.1ML LIQD nasal spray 1 spray by Nasal route as needed for Opioid Reversal 1 each 5    polyethylene glycol (GLYCOLAX) 17 g packet Take 17 g by mouth daily as needed for Constipation      Multiple Vitamins-Minerals (THERAPEUTIC MULTIVITAMIN-MINERALS) tablet Take 1 tablet by mouth daily      aluminum & magnesium hydroxide-simethicone (MAALOX) 200-200-20 MG/5ML SUSP suspension Take 10 mLs by mouth every 6 hours as needed for Indigestion       ondansetron (ZOFRAN) 4 MG tablet Take 4 mg by mouth every 8 hours as needed for Nausea or Vomiting      senna (SENOKOT) 8.6 MG tablet Take 2 tablets by mouth 2 times daily as needed for Constipation      glucose (GLUTOSE) 40 % GEL Take 37.5 mLs by mouth as needed (hypoglycemia) 45 g 1    insulin lispro (HUMALOG) 100 UNIT/ML injection vial Inject 0-6 Units into the skin 3 times daily (with meals) 1 vial 3    insulin lispro (HUMALOG) 100 UNIT/ML injection vial Inject 0-3 Units into the skin nightly 1 vial 3    bisacodyl (DULCOLAX) 5 MG EC tablet Take 1 tablet by mouth daily as needed for Constipation      tamsulosin (FLOMAX) 0.4 MG capsule Take 1 capsule by mouth daily (Patient taking differently: Take 0.4 mg by mouth nightly ) 30 capsule 3    hydrOXYzine (VISTARIL) 25 MG capsule Take 25 mg by mouth 3 times daily as needed      amitriptyline (ELAVIL) 75 MG tablet Take 1 tablet by mouth nightly 30 tablet 3    glucose monitoring kit (FREESTYLE) monitoring kit 1 kit by Does not apply route daily 1 kit 0    blood glucose test strips (ASCENSIA AUTODISC VI;ONE TOUCH ULTRA TEST VI) strip Use with associated glucose meter to check fluctuating blood sugars  strip 11    Lancets MISC 1 each by Does not apply route 3 times daily 600 each 1    famotidine (PEPCID) 20 MG tablet Take 20 mg by mouth 2 times daily      budesonide-formoterol (SYMBICORT) 160-4.5 MCG/ACT AERO Inhale 2 puffs into the lungs 2 times daily 1 Inhaler 3    ipratropium-albuterol (DUONEB) 0.5-2.5 (3) MG/3ML SOLN nebulizer solution Inhale 3 mLs into the lungs every 4 hours as needed for Shortness of Breath 360 mL 1    insulin glargine (LANTUS) 100 UNIT/ML injection vial Inject 25 Units into the skin Daily with supper 1 vial 3    lactobacillus (BACID) TABS Take 1 tablet by mouth 2 times daily 30 tablet 0    apixaban (ELIQUIS) 5 MG TABS tablet Take 1 tablet by mouth 2 times daily 180 tablet 1    albuterol sulfate HFA (VENTOLIN HFA) 108 (90 Base) MCG/ACT inhaler Inhale 2 puffs into the lungs every 6 hours as needed for Wheezing      metFORMIN (GLUCOPHAGE) 500 MG tablet Take 1 tablet by mouth 2 times daily (with meals) 180 tablet 5     No current facility-administered medications on file prior to encounter. REVIEW OF SYSTEMS    Pertinent items noted in HPI. Objective:      BP (!) 100/52   Pulse 60   Temp 98.1 °F (36.7 °C) (Tympanic)   Resp 18   Ht 6' 1\" (1.854 m)   Wt 144 lb (65.3 kg)   BMI 19.00 kg/m²     Wt Readings from Last 3 Encounters:   06/07/21 144 lb (65.3 kg)   05/28/21 165 lb (74.8 kg)   05/03/21 153 lb (69.4 kg)       PHYSICAL EXAM    General Appearance: alert and oriented to person, place and time, well-developed and well-nourished, in no acute distress  Skin: warm and dry, no rash or erythema, left leg ulcers  Head: normocephalic and atraumatic  Eyes: pupils equal, round, extraocular eye movements intact, and conjunctivae normal  Pulmonary/Chest: normal air movement, no respiratory distress  Extremities: no cyanosis and no clubbing or edema   Musculoskeletal: no joint swelling or tenderness.  Bilateral BKA  Neurologic: wheelchair bound, coordination normal and speech normal      Assessment:     Problem List Items Addressed This Visit     History of below knee amputation, left (HCC) - Primary    Relevant Orders    Supply: Wound Cleanser    Supply: Wound Dressings    Supply: Cover and Secure    History of below knee amputation, right (HCC)    Relevant Orders    Supply: Wound Cleanser    Supply: Wound Dressings    Supply: Cover and Secure    Leg ulcer, left, with fat layer exposed ([address]    Relevant Orders    Supply: Wound Cleanser    Supply: Wound Dressings    Supply: Cover and Secure    Moderate protein malnutrition (HCC)    Relevant Orders    Supply: Wound Cleanser    Supply: Wound Dressings    Supply: Cover and Secure    PAD (peripheral artery disease) ([facility])    Relevant Orders    Supply: Wound Cleanser    Supply: Wound Dressings    Supply: Cover and Secure    Tobacco dependence    Relevant Orders    Supply: Wound Cleanser Supply: Wound Dressings    Supply: Cover and Secure    Type 2 diabetes mellitus with diabetic polyneuropathy, with long-term current use of insulin ([facility])    Relevant Orders    Supply: Wound Cleanser    Supply: Wound Dressings    Supply: Cover and Secure           Procedure Note  Indications:  Based on my examination of this patient's wound(s)/ulcer(s) today, debridement is required to promote healing and evaluate the wound base. Performed by: MARCELO Stephenson CNP    Consent obtained:  Yes    Time out taken:  Yes    Pain Control: Anesthetic  Anesthetic: 2% Lidocaine Gel Topical       Debridement:Excisional Debridement    Using curette the wound(s)/ulcer(s) was/were sharply debrided down through and including the removal of subcutaneous tissue. Devitalized Tissue Debrided:  fibrin, biofilm and slough    Pre Debridement Measurements:  Are located in the Wound/Ulcer Documentation Flow Sheet    Wound/Ulcer #: 2 and 3    Post Debridement Measurements:  Wound/Ulcer Descriptions are Pre Debridement except measurements:    Negative Pressure Wound Therapy Leg Anterior; Left (Active)   Number of days: 62       Wound 04/21/21 Leg Left;Distal;Medial #1 (Active)   Wound Image   04/21/21 1331   Wound Etiology Non-Healing Surgical 06/07/21 1332   Dressing Status Dry; Intact 06/07/21 1332   Wound Cleansed Cleansed with saline 05/27/21 1548   Dressing/Treatment Dry dressing; Ace wrap 05/28/21 1215   Dressing Change Due 05/27/21 05/26/21 0850   Wound Length (cm) 0 cm 06/07/21 1332   Wound Width (cm) 0 cm 06/07/21 1332   Wound Depth (cm) 0 cm 06/07/21 1332   Wound Surface Area (cm^2) 0 cm^2 06/07/21 1332   Change in Wound Size % (l*w) 100 06/07/21 1332   Wound Volume (cm^3) 0 cm^3 06/07/21 1332   Wound Healing % 100 06/07/21 1332   Post-Procedure Length (cm) 0 cm 06/07/21 1332   Post-Procedure Width (cm) 0 cm 06/07/21 1332   Post-Procedure Depth (cm) 0 cm 06/07/21 1332   Post-Procedure Surface Area (cm^2) 0 cm^2 06/07/21 1332   Post-Procedure Volume (cm^3) 0 cm^3 06/07/21 1332   Wound Assessment Fibrin;Dry 06/07/21 1332   Drainage Amount None 05/28/21 1215   Drainage Description Serosanguinous 05/27/21 1548   Odor None 05/28/21 1215   Odalys-wound Assessment Other (Comment) 05/28/21 1215   Margins Other (Comment) 05/28/21 1215   Wound Thickness Description not for Pressure Injury Full thickness 05/24/21 1257   Number of days: 47       Wound 04/21/21 Leg Left;Distal;Lateral #2 (Active)   Wound Image   04/21/21 1331   Wound Etiology Non-Healing Surgical 06/07/21 1332   Dressing Status New drainage noted; Old drainage noted 06/07/21 1332   Wound Cleansed Soap and water 06/07/21 1332   Dressing/Treatment Dry dressing; Ace wrap 05/28/21 1215   Dressing Change Due 05/27/21 05/26/21 0850   Wound Length (cm) 1.1 cm 06/07/21 1332   Wound Width (cm) 1.1 cm 06/07/21 1332   Wound Depth (cm) 0.3 cm 06/07/21 1332   Wound Surface Area (cm^2) 1.21 cm^2 06/07/21 1332   Change in Wound Size % (l*w) 50 06/07/21 1332   Wound Volume (cm^3) 0.36 cm^3 06/07/21 1332   Wound Healing % 86 06/07/21 1332   Post-Procedure Length (cm) 1.1 cm 06/07/21 1332   Post-Procedure Width (cm) 1.1 cm 06/07/21 1332   Post-Procedure Depth (cm) 0.3 cm 06/07/21 1332   Post-Procedure Surface Area (cm^2) 1.21 cm^2 06/07/21 1332   Post-Procedure Volume (cm^3) 0.36 cm^3 06/07/21 1332   Distance Tunneling (cm) 1.4 cm 05/12/21 1433   Tunneling Position ___ O'Clock 10:00 05/12/21 1433   Wound Assessment Pink/red;Slough 06/07/21 1332   Drainage Amount Moderate 06/07/21 1332   Drainage Description Serosanguinous 06/07/21 1332   Odor None 06/07/21 1332   Odalys-wound Assessment Blanchable erythema 06/07/21 1332   Margins Defined edges 06/07/21 1332   Wound Thickness Description not for Pressure Injury Full thickness 05/24/21 1257   Number of days: 47       Wound 04/21/21 Leg Left;Proximal #3 (Active)   Wound Image   04/21/21 1331   Wound Etiology Non-Healing Surgical 06/07/21 1332   Dressing Status Dry; Intact 06/07/21 1332   Wound Cleansed Soap and water 06/07/21 1332   Dressing/Treatment Dry dressing; Ace wrap 05/28/21 1215   Dressing Change Due 05/27/21 05/26/21 0850   Wound Length (cm) 0 cm 06/07/21 1332   Wound Width (cm) 0 cm 06/07/21 1332   Wound Depth (cm) 0 cm 06/07/21 1332   Wound Surface Area (cm^2) 0 cm^2 06/07/21 1332   Change in Wound Size % (l*w) 100 06/07/21 1332   Wound Volume (cm^3) 0 cm^3 06/07/21 1332   Wound Healing % 100 06/07/21 1332   Post-Procedure Length (cm) 0.6 cm 06/07/21 1332   Post-Procedure Width (cm) 1 cm 06/07/21 1332   Post-Procedure Depth (cm) 0.3 cm 06/07/21 1332   Post-Procedure Surface Area (cm^2) 0.6 cm^2 06/07/21 1332   Post-Procedure Volume (cm^3) 0.18 cm^3 06/07/21 1332   Wound Assessment Dry;Fibrin 06/07/21 1332   Drainage Amount None 05/28/21 1215   Drainage Description Serosanguinous 05/24/21 1257   Odor None 05/28/21 1215   Odalys-wound Assessment Other (Comment) 05/28/21 1215   Margins Other (Comment) 05/28/21 1215   Wound Thickness Description not for Pressure Injury Full thickness 05/24/21 1257   Number of days: 47       Percent of Wound(s)/Ulcer(s) Debrided: 100%    Total Surface Area Debrided:  1.81 sq cm     Diabetic/Pressure/Non Pressure Ulcers only:  Ulcer: Non-Pressure ulcer, fat layer exposed    Estimated Blood Loss:  Minimal    Hemostasis Achieved:  by pressure    Procedural Pain:  0  / 10     Post Procedural Pain:  0 / 10     Response to treatment:  Well tolerated by patient. Plan:     Treatment Note please see attached Discharge Instructions    Written patient dismissal instructions given to patient and signed by patient or POA.          Discharge Instructions          [address]: [phone] Fax: [phone]             Visit Cassie Instructions / Physician Orders     DATE: 6/7/2021     Home Care: [address]:      Wound Location: Left stump site X2     Cleanse with: Liquid antibacterial soap and water, rinse well      Dressing Orders: Distal lateral wound: Collagen to wound (Fibracol preferred) dry dressing-change daily                                 Proximal wound: Triad cream to wound, dry dressing-change daily     Frequency: Change wounds Daily     Additional Orders: Increase protein to diet (meat, cheese, eggs, fish, peanut butter, nuts and beans)  Multivitamin daily  ELEVATE LEGS AS MUCH AS POSSIBLE     [email or web address] with Migdalia RAMOS                                                                                                   ROOM TYPE   [x]??????? CHAIR     []??????? STRETCHER  []??????? EITHER             (Please note your next appointment above and if you are unable to keep, kindly give a 24 hour notice. Thank you.)     If you experience any of the following, please call the [address] [place] [address] during business hours:  [phone]  Your Phone call may be forwarded to [phone] during business hours that [facility] is closed.     * Increase in Pain  * Temperature over 101  * Increase in drainage from your wound  * Drainage with a foul odor  * Bleeding  * Increase in swelling  * Need for compression bandage changes due to slippage, breakthrough drainage.     If you need medical attention outside of the business hours of the [address] [place] [address] please contact your PCP or go to the nearest emergency room.     The information contained in the After Visit Summary has been reviewed with me, the patient and/or responsible adult, by my health care provider(s). I had the opportunity to ask questions regarding this information. I have elected to receive;      []????? ?? After Visit Summary  [x]??????? Comprehensive Discharge Instruction        Patient signature______________________________________Date:________  Electronically signed by Reina Chaudhari RN on 6/7/2021 at 1:53 PM  Electronically signed by Heather Bhat MARCELO Meng CNP on 6/7/2021 at 1:53 PM          Electronically signed by MARCELO Winn CNP on 6/7/2021 at 1:57 PM

## 2021-06-21 ENCOUNTER — HOSPITAL ENCOUNTER (OUTPATIENT)
Dept: WOUND CARE | Age: 66
Discharge: HOME | End: 2021-06-21

## 2021-07-13 ENCOUNTER — HOSPITAL ENCOUNTER (OUTPATIENT)
Dept: WOUND CARE | Age: 66
Discharge: HOME | End: 2021-07-13
Payer: MEDICARE

## 2021-07-13 VITALS
DIASTOLIC BLOOD PRESSURE: 61 MMHG | HEART RATE: 100 BPM | WEIGHT: 144 LBS | RESPIRATION RATE: 18 BRPM | TEMPERATURE: 98 F | BODY MASS INDEX: 19 KG/M2 | SYSTOLIC BLOOD PRESSURE: 123 MMHG

## 2021-07-13 DIAGNOSIS — E11.42 TYPE 2 DIABETES MELLITUS WITH DIABETIC POLYNEUROPATHY, WITH LONG-TERM CURRENT USE OF INSULIN (HCC): ICD-10-CM

## 2021-07-13 DIAGNOSIS — E44.0 MODERATE PROTEIN MALNUTRITION (HCC): ICD-10-CM

## 2021-07-13 DIAGNOSIS — Z79.4 TYPE 2 DIABETES MELLITUS WITH DIABETIC POLYNEUROPATHY, WITH LONG-TERM CURRENT USE OF INSULIN (HCC): ICD-10-CM

## 2021-07-13 DIAGNOSIS — Z89.511 HISTORY OF BELOW KNEE AMPUTATION, RIGHT (HCC): ICD-10-CM

## 2021-07-13 DIAGNOSIS — Z89.512 HISTORY OF BELOW KNEE AMPUTATION, LEFT (HCC): Primary | ICD-10-CM

## 2021-07-13 DIAGNOSIS — F17.200 TOBACCO DEPENDENCE: ICD-10-CM

## 2021-07-13 DIAGNOSIS — I73.9 PAD (PERIPHERAL ARTERY DISEASE) (HCC): ICD-10-CM

## 2021-07-13 DIAGNOSIS — L97.922 LEG ULCER, LEFT, WITH FAT LAYER EXPOSED (HCC): ICD-10-CM

## 2021-07-13 PROCEDURE — 11042 DBRDMT SUBQ TIS 1ST 20SQCM/<: CPT

## 2021-07-13 PROCEDURE — 11042 DBRDMT SUBQ TIS 1ST 20SQCM/<: CPT | Performed by: NURSE PRACTITIONER

## 2021-07-13 RX ORDER — LIDOCAINE HYDROCHLORIDE 40 MG/ML
SOLUTION TOPICAL ONCE
Status: CANCELLED | OUTPATIENT
Start: 2021-07-13 | End: 2021-07-13

## 2021-07-13 RX ORDER — LIDOCAINE HYDROCHLORIDE 20 MG/ML
JELLY TOPICAL ONCE
Status: CANCELLED | OUTPATIENT
Start: 2021-07-13 | End: 2021-07-13

## 2021-07-13 RX ORDER — LIDOCAINE 40 MG/G
CREAM TOPICAL ONCE
Status: CANCELLED | OUTPATIENT
Start: 2021-07-13 | End: 2021-07-13

## 2021-07-13 RX ORDER — LIDOCAINE 50 MG/G
OINTMENT TOPICAL ONCE
Status: CANCELLED | OUTPATIENT
Start: 2021-07-13 | End: 2021-07-13

## 2021-07-13 RX ORDER — LIDOCAINE HYDROCHLORIDE 20 MG/ML
JELLY TOPICAL ONCE
Status: COMPLETED | OUTPATIENT
Start: 2021-07-13 | End: 2021-07-13

## 2021-07-13 RX ADMIN — LIDOCAINE HYDROCHLORIDE 6 ML: 20 JELLY TOPICAL at 08:35

## 2021-07-13 ASSESSMENT — PAIN SCALES - GENERAL: PAINLEVEL_OUTOF10: 6
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
## 2021-07-27 ENCOUNTER — TELEPHONE (OUTPATIENT)
Dept: WOUND CARE | Age: 66
End: 2021-07-27

## 2021-07-27 ENCOUNTER — HOSPITAL ENCOUNTER (OUTPATIENT)
Dept: WOUND CARE | Age: 66
Discharge: HOME | End: 2021-07-27

## 2021-08-10 ENCOUNTER — HOSPITAL ENCOUNTER (EMERGENCY)
Age: 66
Discharge: HOME | End: 2021-08-10
Attending: EMERGENCY MEDICINE
Payer: MEDICARE

## 2021-08-10 ENCOUNTER — APPOINTMENT (OUTPATIENT)
Dept: GENERAL RADIOLOGY | Age: 66
End: 2021-08-10
Payer: MEDICARE

## 2021-08-10 VITALS
HEART RATE: 79 BPM | DIASTOLIC BLOOD PRESSURE: 57 MMHG | RESPIRATION RATE: 20 BRPM | OXYGEN SATURATION: 97 % | TEMPERATURE: 98.1 F | SYSTOLIC BLOOD PRESSURE: 101 MMHG

## 2021-08-10 DIAGNOSIS — R07.89 OTHER CHEST PAIN: ICD-10-CM

## 2021-08-10 DIAGNOSIS — R10.84 GENERALIZED ABDOMINAL PAIN: ICD-10-CM

## 2021-08-10 DIAGNOSIS — Z48.89 ENCOUNTER FOR POST SURGICAL WOUND CHECK: ICD-10-CM

## 2021-08-10 DIAGNOSIS — M79.605 LEFT LEG PAIN: Primary | ICD-10-CM

## 2021-08-10 DIAGNOSIS — R74.01 ELEVATED ALT MEASUREMENT: ICD-10-CM

## 2021-08-10 LAB
ABSOLUTE EOS #: 0.19 K/UL (ref 0–0.44)
ABSOLUTE IMMATURE GRANULOCYTE: 0.07 K/UL (ref 0–0.3)
ABSOLUTE LYMPH #: 1.69 K/UL (ref 1.1–3.7)
ABSOLUTE MONO #: 0.82 K/UL (ref 0.1–1.2)
ALBUMIN SERPL-MCNC: 3.4 G/DL (ref 3.5–5.2)
ALBUMIN/GLOBULIN RATIO: 0.9 (ref 1–2.5)
ALP BLD-CCNC: 197 U/L (ref 40–129)
ALT SERPL-CCNC: 87 U/L (ref 5–41)
ANION GAP SERPL CALCULATED.3IONS-SCNC: 12 MMOL/L (ref 9–17)
AST SERPL-CCNC: 26 U/L
BASOPHILS # BLD: 1 % (ref 0–2)
BASOPHILS ABSOLUTE: 0.07 K/UL (ref 0–0.2)
BILIRUB SERPL-MCNC: <0.1 MG/DL (ref 0.3–1.2)
BUN BLDV-MCNC: 40 MG/DL (ref 8–23)
BUN/CREAT BLD: ABNORMAL (ref 9–20)
CALCIUM SERPL-MCNC: 9.3 MG/DL (ref 8.6–10.4)
CHLORIDE BLD-SCNC: 107 MMOL/L (ref 98–107)
CO2: 21 MMOL/L (ref 20–31)
CREAT SERPL-MCNC: 0.87 MG/DL (ref 0.7–1.2)
DIFFERENTIAL TYPE: ABNORMAL
EOSINOPHILS RELATIVE PERCENT: 2 % (ref 1–4)
GFR AFRICAN AMERICAN: >60 ML/MIN
GFR NON-AFRICAN AMERICAN: >60 ML/MIN
GFR SERPL CREATININE-BSD FRML MDRD: ABNORMAL ML/MIN/{1.73_M2}
GFR SERPL CREATININE-BSD FRML MDRD: ABNORMAL ML/MIN/{1.73_M2}
GLUCOSE BLD-MCNC: 220 MG/DL (ref 70–99)
HCT VFR BLD CALC: 35.8 % (ref 40.7–50.3)
HEMOGLOBIN: 10.4 G/DL (ref 13–17)
IMMATURE GRANULOCYTES: 1 %
LIPASE: 17 U/L (ref 13–60)
LYMPHOCYTES # BLD: 15 % (ref 24–43)
MCH RBC QN AUTO: 25.9 PG (ref 25.2–33.5)
MCHC RBC AUTO-ENTMCNC: 29.1 G/DL (ref 28.4–34.8)
MCV RBC AUTO: 89.1 FL (ref 82.6–102.9)
MONOCYTES # BLD: 8 % (ref 3–12)
NRBC AUTOMATED: 0 PER 100 WBC
PDW BLD-RTO: 16.9 % (ref 11.8–14.4)
PLATELET # BLD: 499 K/UL (ref 138–453)
PLATELET ESTIMATE: ABNORMAL
PMV BLD AUTO: 9.6 FL (ref 8.1–13.5)
POTASSIUM SERPL-SCNC: 4.4 MMOL/L (ref 3.7–5.3)
RBC # BLD: 4.02 M/UL (ref 4.21–5.77)
RBC # BLD: ABNORMAL 10*6/UL
SEG NEUTROPHILS: 73 % (ref 36–65)
SEGMENTED NEUTROPHILS ABSOLUTE COUNT: 8.15 K/UL (ref 1.5–8.1)
SODIUM BLD-SCNC: 140 MMOL/L (ref 135–144)
TOTAL PROTEIN: 7.1 G/DL (ref 6.4–8.3)
TROPONIN INTERP: ABNORMAL
TROPONIN INTERP: ABNORMAL
TROPONIN T: ABNORMAL NG/ML
TROPONIN T: ABNORMAL NG/ML
TROPONIN, HIGH SENSITIVITY: 31 NG/L (ref 0–22)
TROPONIN, HIGH SENSITIVITY: 34 NG/L (ref 0–22)
WBC # BLD: 11 K/UL (ref 3.5–11.3)
WBC # BLD: ABNORMAL 10*3/UL

## 2021-08-10 PROCEDURE — 84484 ASSAY OF TROPONIN QUANT: CPT

## 2021-08-10 PROCEDURE — 96372 THER/PROPH/DIAG INJ SC/IM: CPT

## 2021-08-10 PROCEDURE — 6360000002 HC RX W HCPCS: Performed by: EMERGENCY MEDICINE

## 2021-08-10 PROCEDURE — 93005 ELECTROCARDIOGRAM TRACING: CPT | Performed by: EMERGENCY MEDICINE

## 2021-08-10 PROCEDURE — 6370000000 HC RX 637 (ALT 250 FOR IP): Performed by: EMERGENCY MEDICINE

## 2021-08-10 PROCEDURE — 71045 X-RAY EXAM CHEST 1 VIEW: CPT

## 2021-08-10 PROCEDURE — 99285 EMERGENCY DEPT VISIT HI MDM: CPT

## 2021-08-10 PROCEDURE — 83690 ASSAY OF LIPASE: CPT

## 2021-08-10 PROCEDURE — 85025 COMPLETE CBC W/AUTO DIFF WBC: CPT

## 2021-08-10 PROCEDURE — 80053 COMPREHEN METABOLIC PANEL: CPT

## 2021-08-10 RX ORDER — ORPHENADRINE CITRATE 30 MG/ML
60 INJECTION INTRAMUSCULAR; INTRAVENOUS ONCE
Status: COMPLETED | OUTPATIENT
Start: 2021-08-10 | End: 2021-08-10

## 2021-08-10 RX ORDER — ACETAMINOPHEN 500 MG
1000 TABLET ORAL ONCE
Status: COMPLETED | OUTPATIENT
Start: 2021-08-10 | End: 2021-08-10

## 2021-08-10 RX ORDER — MAGNESIUM HYDROXIDE/ALUMINUM HYDROXICE/SIMETHICONE 120; 1200; 1200 MG/30ML; MG/30ML; MG/30ML
30 SUSPENSION ORAL ONCE
Status: COMPLETED | OUTPATIENT
Start: 2021-08-10 | End: 2021-08-10

## 2021-08-10 RX ADMIN — ORPHENADRINE CITRATE 60 MG: 60 INJECTION INTRAMUSCULAR; INTRAVENOUS at 15:34

## 2021-08-10 RX ADMIN — ACETAMINOPHEN 1000 MG: 500 TABLET ORAL at 14:41

## 2021-08-10 RX ADMIN — ALUMINUM HYDROXIDE, MAGNESIUM HYDROXIDE, AND SIMETHICONE 30 ML: 200; 200; 20 SUSPENSION ORAL at 14:41

## 2021-08-10 ASSESSMENT — ENCOUNTER SYMPTOMS
SORE THROAT: 0
SHORTNESS OF BREATH: 1
ABDOMINAL PAIN: 0
NAUSEA: 1
CONSTIPATION: 0
RHINORRHEA: 1
DIARRHEA: 0

## 2021-08-10 ASSESSMENT — PAIN DESCRIPTION - LOCATION: LOCATION: LEG

## 2021-08-10 ASSESSMENT — PAIN SCALES - GENERAL
PAINLEVEL_OUTOF10: 8
PAINLEVEL_OUTOF10: 8

## 2021-08-10 ASSESSMENT — PAIN DESCRIPTION - ORIENTATION: ORIENTATION: LEFT

## 2021-08-10 ASSESSMENT — PAIN DESCRIPTION - DESCRIPTORS: DESCRIPTORS: SHARP;NUMBNESS

## 2021-08-10 ASSESSMENT — HEART SCORE: ECG: 0

## 2021-08-10 NOTE — ED PROVIDER NOTES
[facility]     Emergency Department     Faculty Note/ Attestation      Pt Name: Kevin Ceron                                       MRN: [id number]  [place] 1955  Date of evaluation: 8/10/2021  Patients PCP:    Traci Nicholson DO    Attestation  I performed a history and physical examination of the patient/ or directly observed  and discussed management with the resident. I reviewed the residents note and agree with the documented findings and plan of care. Any areas of disagreement are noted on the chart. I was personally present for the key portions of any procedures. I have documented in the chart those procedures where I was not present during the key portions. I have reviewed the emergency nurses triage note. I agree with the chief complaint, past medical history, past surgical history, allergies, medications, social and family history as documented unless otherwise noted below. For Physician Assistant/ Nurse Practitioner cases/documentation I have personally evaluated this patient and have completed at least one if not all key elements of the E/M (history, physical exam, and MDM). Additional findings are as noted. This patient was evaluated in the Emergency Department for symptoms described in the history of present illness. The patient was evaluated in the context of the global COVID-19 pandemic, which necessitated consideration that the patient might be at risk for infection with the SARS-CoV-2 virus that causes COVID-19. Institutional protocols and algorithms that pertain to the evaluation of patients at risk for COVID-19 are in a state of rapid change based on information released by regulatory bodies including the CDC and federal and state organizations. These policies and algorithms were followed during the patient's care in the ED.      Initial Screens:             Vitals:    Vitals:    08/10/21 1331   BP: (!) 144/70   Pulse: 79   Resp: 20   Temp: 98.1 °F (36.7 °C)   TempSrc: Oral   SpO2: 93%       Chief Complaint      Chief Complaint   Patient presents with    Leg Pain     Left BKA done 3 months ago- pain and thinks its infected           oral temperature is 98.1 °F (36.7 °C). His blood pressure is 144/70 (abnormal) and his pulse is 79. His respiration is 20 and oxygen saturation is 93%.             DIAGNOSTIC RESULTS       RADIOLOGY:   XR CHEST PORTABLE    (Results Pending)         LABS:  Labs Reviewed   CBC WITH AUTO DIFFERENTIAL   COMPREHENSIVE METABOLIC PANEL W/ REFLEX TO MG FOR LOW K   LIPASE   TROPONIN         EMERGENCY DEPARTMENT COURSE:     -------------------------      BP: (!) 144/70, Temp: 98.1 °F (36.7 °C), Pulse: 79, Resp: 20    System Problem List     Patient Active Problem List   Diagnosis    Type 2 diabetes mellitus with diabetic polyneuropathy, with long-term current use of insulin (HCC)    Neuropathic pain, leg    Diabetic polyneuropathy (HCC)    Allergic rhinitis    Tobacco dependence    Edentulous    Dysphagia    Lung nodules    Esophageal cancer (HCC)    Fracture of humerus, left, closed    Closed fracture of humerus    DM type 2 with diabetic peripheral neuropathy (HCC)    COPD without exacerbation (HCC)    HLD (hyperlipidemia)    Gastroesophageal reflux disease    Chronic pain syndrome    Marijuana use    History of colon polyps    Functional diarrhea    Sepsis due to methicillin resistant Staphylococcus aureus (MRSA) (HCC)    History of esophageal cancer    Osteomyelitis, chronic, ankle or foot ([address]    PAD (peripheral artery disease) (HCC)    Other pulmonary embolism without acute cor pulmonale (HCC)    Carotid stenosis, asymptomatic, bilateral    Lower limb amputation status    Fx humeral neck, right, closed, initial encounter    Transient hypotension    Constipation due to opioid therapy    Acute kidney injury ([address]    Complication of below knee amputation stump ([address]    COPD exacerbation ([address]    Hyponatremia  Pain, phantom limb (HCC)    S/P BKA (below knee amputation) bilateral (HCC)    Moderate protein malnutrition (HCC)    Essential hypertension    Uncontrolled type 2 diabetes mellitus with hyperglycemia (HCC)    History of pulmonary embolism - 2017    Atelectasis    Uncontrolled type 2 diabetes mellitus with foot ulcer (HCC)    Azotemia    Anemia, normocytic normochromic    Drug-induced constipation    Osteomyelitis of metatarsals of left foot (HCC)    Diabetic foot infection ([address]    Noncompliance    Type 1 diabetes mellitus with diabetic foot infection (HCC)    Acute osteomyelitis of left foot (HCC)    Cellulitis of left foot    Mild malnutrition (HCC)    Hypocalcemia    Hypokalemia    Moderate malnutrition (HCC)    History of below knee amputation, right (HCC)    Foot ulcer with fat layer exposed, left (HCC)    Chronic refractory osteomyelitis of left foot (HCC)    Sepsis ([address]    Pyogenic inflammation of bone ([address]    Cellulitis of left lower extremity at BKA stump    History of below knee amputation, left ([address]    Leg ulcer, left, with fat layer exposed ([address]    S/P amputation    Tobacco abuse    Pneumonia due to organism    Abdominal pain    Marijuana abuse    Parapneumonic effusion    Hiatus hernia -large         Comments  Chronic Prob List noted          Eren Claire MD,, MD, F.A.C.E.P.   Attending Emergency Physician         Eren Claire MD  08/10/21 1703
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
## 2021-08-10 NOTE — ED NOTES
confirmed patient's allowed to return to [facility] EM,S ETA 5:00pm.      SERGEY Ho  08/10/21 0157

## 2021-08-10 NOTE — ED NOTES
Patient arrived to ED via EMS to triage  Patient is a bilateral Below the knee amputation  Patient states his left amputation was done approximately 3 weeks ago  Patient states he is having pain to left and that it is \"infected\" (pt states per him)  Patient states he arrived at a rehab last night that did not have air conditioning  Patient states they have not been giving him his antibiotics as prescribed  Patients story continues to change and is different to nurse, resident and attending  Patient resting on cot  Patient updated on plan of care      Mariposa Holland RN  08/10/21 1607

## 2021-08-10 NOTE — ED PROVIDER NOTES
MONITORING KIT (FREESTYLE) MONITORING KIT    1 kit by Does not apply route daily    HYDROXYZINE (VISTARIL) 25 MG CAPSULE    Take 25 mg by mouth 3 times daily as needed    INSULIN GLARGINE (LANTUS) 100 UNIT/ML INJECTION VIAL    Inject 25 Units into the skin Daily with supper    INSULIN LISPRO (HUMALOG) 100 UNIT/ML INJECTION VIAL    Inject 0-6 Units into the skin 3 times daily (with meals)    INSULIN LISPRO (HUMALOG) 100 UNIT/ML INJECTION VIAL    Inject 0-3 Units into the skin nightly    IPRATROPIUM-ALBUTEROL (DUONEB) 0.5-2.5 (3) MG/3ML SOLN NEBULIZER SOLUTION    Inhale 3 mLs into the lungs every 4 hours as needed for Shortness of Breath    LACTOBACILLUS (BACID) TABS    Take 1 tablet by mouth 2 times daily    LANCETS MISC    1 each by Does not apply route 3 times daily    METFORMIN (GLUCOPHAGE) 500 MG TABLET    Take 1 tablet by mouth 2 times daily (with meals)    MULTIPLE VITAMINS-MINERALS (THERAPEUTIC MULTIVITAMIN-MINERALS) TABLET    Take 1 tablet by mouth daily    NALOXONE 4 MG/0.1ML LIQD NASAL SPRAY    1 spray by Nasal route as needed for Opioid Reversal    ONDANSETRON (ZOFRAN) 4 MG TABLET    Take 4 mg by mouth every 8 hours as needed for Nausea or Vomiting    POLYETHYLENE GLYCOL (GLYCOLAX) 17 G PACKET    Take 17 g by mouth daily as needed for Constipation    SENNA (SENOKOT) 8.6 MG TABLET    Take 2 tablets by mouth 2 times daily as needed for Constipation    SIMETHICONE (MYLICON) 80 MG CHEWABLE TABLET    Take 1 tablet by mouth every 6 hours as needed for Flatulence    TAMSULOSIN (FLOMAX) 0.4 MG CAPSULE    Take 1 capsule by mouth daily       Encounter Medications  Orders Placed This Encounter   Medications    acetaminophen (TYLENOL) tablet 1,000 mg    aluminum & magnesium hydroxide-simethicone (MAALOX) 200-200-20 MG/5ML suspension 30 mL    orphenadrine (NORFLEX) injection 60 mg       ALLERGIES     is allergic to gabapentin and other.       RECENT VITALS:   Temp: 98.1 °F (36.7 °C),  Pulse: 79, Resp: 20, BP: (!) 101/57    RADIOLOGY:   XR CHEST PORTABLE   Final Result   Cardiomegaly and chronic pulmonary change without acute pulmonary process. LABS:  Labs Reviewed   CBC WITH AUTO DIFFERENTIAL - Abnormal; Notable for the following components:       Result Value    RBC 4.02 (*)     Hemoglobin 10.4 (*)     Hematocrit 35.8 (*)     RDW 16.9 (*)     Platelets 785 (*)     Seg Neutrophils 73 (*)     Lymphocytes 15 (*)     Immature Granulocytes 1 (*)     Segs Absolute 8.15 (*)     All other components within normal limits   COMPREHENSIVE METABOLIC PANEL W/ REFLEX TO MG FOR LOW K - Abnormal; Notable for the following components:    Glucose 220 (*)     BUN 40 (*)     Alkaline Phosphatase 197 (*)     ALT 87 (*)     Total Bilirubin <0.10 (*)     Albumin 3.4 (*)     Albumin/Globulin Ratio 0.9 (*)     All other components within normal limits   TROPONIN - Abnormal; Notable for the following components:    Troponin, High Sensitivity 34 (*)     All other components within normal limits   TROPONIN - Abnormal; Notable for the following components:    Troponin, High Sensitivity 31 (*)     All other components within normal limits   LIPASE     Ongoing treatment for osteomyelitis after BKA. Patient is unhappy with his ECF because of lack of air conditioning. Will review labs here obtain social work consultation regarding placement elsewhere. PLAN/ TASKS OUTSTANDING       Labs, some relief, social work consultation, disposition    (Please note that portions of this note were completed with a voice recognition program.  Efforts were made to edit the dictations but occasionally words are mis-transcribed. )    Deandra Goodman MD,, MD, F.A.C.E.P.   Attending Emergency Physician        Deandra Goodman MD  08/10/21 5562

## 2021-08-10 NOTE — CARE COORDINATION
Call to [address] to see if pt can return to their care, I spoke with admissions and the patient was discharged r/t his insurance discontinuing payment, he did not qualify for medicaid. The patient apparently had the funds to self pay and he refused to self pay therefore was discharged    Met with patient along with , patients story was very difficult to follow as he was complaining about several facilities he has been at. Apparently the patient went to [facility] then to [facility] where he had been to prior to coming to our facility. Dr Silvina Kong was not aware the patient had established care at a SNF currently. I called [facility] and they are currently giving the patient IV antx as ordered and the patient can return to their facility as long as he returns today. Dr Silvina Kong updated and he has plans to discharge the patient. Report number provided for the Nurse to call report and SW will set up transport back to [facility]. I explained to the patient if he does not want to remain at his established care facility when he returns he can have the facility arrange transfer to another SNF.

## 2021-08-10 NOTE — ED NOTES
Report given to [facility]  All questions and concerns addressed     Omar Tavarez RN  08/10/21 9678

## 2021-08-10 NOTE — ED NOTES
Patient's on phone, writer to return to discuss patient's discharge plan.       SERGEY Zacarias  08/10/21 8851

## 2021-08-11 LAB
EKG ATRIAL RATE: 76 BPM
EKG P AXIS: 52 DEGREES
EKG P-R INTERVAL: 134 MS
EKG Q-T INTERVAL: 382 MS
EKG QRS DURATION: 80 MS
EKG QTC CALCULATION (BAZETT): 429 MS
EKG R AXIS: 22 DEGREES
EKG T AXIS: 29 DEGREES
EKG VENTRICULAR RATE: 76 BPM

## 2021-08-17 ENCOUNTER — HOSPITAL ENCOUNTER (OUTPATIENT)
Dept: WOUND CARE | Age: 66
Discharge: HOME | End: 2021-08-17

## 2021-08-29 ENCOUNTER — APPOINTMENT (OUTPATIENT)
Dept: CT IMAGING | Age: 66
DRG: 177 | End: 2021-08-29
Payer: MEDICARE

## 2021-08-29 ENCOUNTER — HOSPITAL ENCOUNTER (INPATIENT)
Age: 66
LOS: 10 days | Discharge: HOME HEALTH | DRG: 177 | End: 2021-09-08
Attending: EMERGENCY MEDICINE | Admitting: INTERNAL MEDICINE
Payer: MEDICARE

## 2021-08-29 ENCOUNTER — APPOINTMENT (OUTPATIENT)
Dept: GENERAL RADIOLOGY | Age: 66
DRG: 177 | End: 2021-08-29
Payer: MEDICARE

## 2021-08-29 DIAGNOSIS — E87.6 HYPOKALEMIA: ICD-10-CM

## 2021-08-29 DIAGNOSIS — R41.82 ALTERED MENTAL STATUS, UNSPECIFIED ALTERED MENTAL STATUS TYPE: Primary | ICD-10-CM

## 2021-08-29 DIAGNOSIS — E83.51 HYPOCALCEMIA: ICD-10-CM

## 2021-08-29 PROBLEM — G93.41 METABOLIC ENCEPHALOPATHY: Status: ACTIVE | Noted: 2021-08-29

## 2021-08-29 LAB
-: NORMAL
ABSOLUTE EOS #: 0.23 K/UL (ref 0–0.4)
ABSOLUTE IMMATURE GRANULOCYTE: 0.23 K/UL (ref 0–0.3)
ABSOLUTE LYMPH #: 4.75 K/UL (ref 1–4.8)
ABSOLUTE MONO #: 1.58 K/UL (ref 0.1–0.8)
ALBUMIN SERPL-MCNC: 2.9 G/DL (ref 3.5–5.2)
ALBUMIN/GLOBULIN RATIO: 1 (ref 1–2.5)
ALLEN TEST: ABNORMAL
ALLEN TEST: ABNORMAL
ALP BLD-CCNC: 150 U/L (ref 40–129)
ALT SERPL-CCNC: 32 U/L (ref 5–41)
AMMONIA: 41 UMOL/L (ref 16–60)
AMORPHOUS: NORMAL
AMPHETAMINE SCREEN URINE: NEGATIVE
ANION GAP SERPL CALCULATED.3IONS-SCNC: 12 MMOL/L (ref 9–17)
ANION GAP: 9 MMOL/L (ref 7–16)
AST SERPL-CCNC: 8 U/L
BACTERIA: NORMAL
BARBITURATE SCREEN URINE: NEGATIVE
BASOPHILS # BLD: 0 % (ref 0–2)
BASOPHILS ABSOLUTE: 0 K/UL (ref 0–0.2)
BENZODIAZEPINE SCREEN, URINE: NEGATIVE
BILIRUB SERPL-MCNC: 0.19 MG/DL (ref 0.3–1.2)
BILIRUBIN URINE: NEGATIVE
BNP INTERPRETATION: ABNORMAL
BUN BLDV-MCNC: 34 MG/DL (ref 8–23)
BUN/CREAT BLD: ABNORMAL (ref 9–20)
BUPRENORPHINE URINE: NORMAL
CALCIUM SERPL-MCNC: 9 MG/DL (ref 8.6–10.4)
CANNABINOID SCREEN URINE: NEGATIVE
CARBOXYHEMOGLOBIN: 0.9 % (ref 0–5)
CASTS UA: NORMAL /LPF (ref 0–2)
CASTS UA: NORMAL /LPF (ref 0–2)
CHLORIDE BLD-SCNC: 114 MMOL/L (ref 98–107)
CO2: 15 MMOL/L (ref 20–31)
COCAINE METABOLITE, URINE: NEGATIVE
COLOR: YELLOW
COMMENT UA: ABNORMAL
CREAT SERPL-MCNC: 1.12 MG/DL (ref 0.7–1.2)
CRYSTALS, UA: NORMAL /HPF
DIFFERENTIAL TYPE: ABNORMAL
EOSINOPHILS RELATIVE PERCENT: 1 % (ref 1–4)
EPITHELIAL CELLS UA: NORMAL /HPF (ref 0–5)
FIO2: ABNORMAL
FIO2: ABNORMAL
GFR AFRICAN AMERICAN: >60 ML/MIN
GFR NON-AFRICAN AMERICAN: 58 ML/MIN
GFR NON-AFRICAN AMERICAN: >60 ML/MIN
GFR SERPL CREATININE-BSD FRML MDRD: >60 ML/MIN
GFR SERPL CREATININE-BSD FRML MDRD: ABNORMAL ML/MIN/{1.73_M2}
GLUCOSE BLD-MCNC: 73 MG/DL (ref 75–110)
GLUCOSE BLD-MCNC: 82 MG/DL (ref 74–100)
GLUCOSE BLD-MCNC: 85 MG/DL (ref 70–99)
GLUCOSE URINE: NEGATIVE
HCO3 VENOUS: 17.6 MMOL/L (ref 22–29)
HCO3 VENOUS: 18.9 MMOL/L (ref 24–30)
HCT VFR BLD CALC: 31.9 % (ref 40.7–50.3)
HEMOGLOBIN: 9.6 G/DL (ref 13–17)
IMMATURE GRANULOCYTES: 1 %
KETONES, URINE: ABNORMAL
LACTIC ACID, SEPSIS WHOLE BLOOD: 0.7 MMOL/L (ref 0.5–1.9)
LACTIC ACID, SEPSIS: NORMAL MMOL/L (ref 0.5–1.9)
LACTIC ACID, WHOLE BLOOD: 0.5 MMOL/L (ref 0.7–2.1)
LACTIC ACID: ABNORMAL MMOL/L
LEUKOCYTE ESTERASE, URINE: NEGATIVE
LYMPHOCYTES # BLD: 21 % (ref 24–44)
MCH RBC QN AUTO: 26.2 PG (ref 25.2–33.5)
MCHC RBC AUTO-ENTMCNC: 30.1 G/DL (ref 28.4–34.8)
MCV RBC AUTO: 86.9 FL (ref 82.6–102.9)
MDMA URINE: NORMAL
METHADONE SCREEN, URINE: NEGATIVE
METHAMPHETAMINE, URINE: NORMAL
METHEMOGLOBIN: ABNORMAL % (ref 0–1.5)
MODE: ABNORMAL
MODE: ABNORMAL
MONOCYTES # BLD: 7 % (ref 1–7)
MORPHOLOGY: ABNORMAL
MUCUS: NORMAL
NEGATIVE BASE EXCESS, VEN: 10 (ref 0–2)
NEGATIVE BASE EXCESS, VEN: 7.5 MMOL/L (ref 0–2)
NITRITE, URINE: NEGATIVE
NOTIFICATION TIME: ABNORMAL
NOTIFICATION: ABNORMAL
NRBC AUTOMATED: 0 PER 100 WBC
O2 DEVICE/FLOW/%: ABNORMAL
O2 DEVICE/FLOW/%: ABNORMAL
O2 SAT, VEN: 45.9 % (ref 60–85)
O2 SAT, VEN: 85 % (ref 60–85)
OPIATES, URINE: NEGATIVE
OTHER OBSERVATIONS UA: NORMAL
OXYCODONE SCREEN URINE: NEGATIVE
OXYHEMOGLOBIN: ABNORMAL % (ref 95–98)
PATIENT TEMP: 37
PATIENT TEMP: ABNORMAL
PCO2, VEN, TEMP ADJ: ABNORMAL MMHG (ref 39–55)
PCO2, VEN: 43.2 MM HG (ref 41–51)
PCO2, VEN: 44.3 (ref 39–55)
PDW BLD-RTO: 16.4 % (ref 11.8–14.4)
PEEP/CPAP: ABNORMAL
PH UA: 5.5 (ref 5–8)
PH VENOUS: 7.22 (ref 7.32–7.43)
PH VENOUS: 7.25 (ref 7.32–7.42)
PH, VEN, TEMP ADJ: ABNORMAL (ref 7.32–7.42)
PHENCYCLIDINE, URINE: NEGATIVE
PLATELET # BLD: 277 K/UL (ref 138–453)
PLATELET ESTIMATE: ABNORMAL
PMV BLD AUTO: 10.6 FL (ref 8.1–13.5)
PO2, VEN, TEMP ADJ: ABNORMAL MMHG (ref 30–50)
PO2, VEN: 25.9 (ref 30–50)
PO2, VEN: 59.7 MM HG (ref 30–50)
POC BUN: 34 MG/DL (ref 8–26)
POC CHLORIDE: 118 MMOL/L (ref 98–107)
POC CREATININE: 1.25 MG/DL (ref 0.51–1.19)
POC HEMATOCRIT: 31 % (ref 41–53)
POC HEMOGLOBIN: 10.5 G/DL (ref 13.5–17.5)
POC IONIZED CALCIUM: 1.43 MMOL/L (ref 1.15–1.33)
POC LACTIC ACID: 0.65 MMOL/L (ref 0.56–1.39)
POC PCO2 TEMP: ABNORMAL MM HG
POC PH TEMP: ABNORMAL
POC PO2 TEMP: ABNORMAL MM HG
POC POTASSIUM: 3.7 MMOL/L (ref 3.5–4.5)
POC SODIUM: 145 MMOL/L (ref 138–146)
POC TCO2: 19 MMOL/L (ref 22–30)
POSITIVE BASE EXCESS, VEN: ABNORMAL (ref 0–3)
POSITIVE BASE EXCESS, VEN: ABNORMAL MMOL/L (ref 0–2)
POTASSIUM SERPL-SCNC: 3.9 MMOL/L (ref 3.7–5.3)
PRO-BNP: 394 PG/ML
PROPOXYPHENE, URINE: NORMAL
PROTEIN UA: ABNORMAL
PSV: ABNORMAL
PT. POSITION: ABNORMAL
RBC # BLD: 3.67 M/UL (ref 4.21–5.77)
RBC # BLD: ABNORMAL 10*6/UL
RBC UA: NORMAL /HPF (ref 0–2)
RENAL EPITHELIAL, UA: NORMAL /HPF
RESPIRATORY RATE: ABNORMAL
SAMPLE SITE: ABNORMAL
SAMPLE SITE: ABNORMAL
SARS-COV-2, RAPID: NOT DETECTED
SEG NEUTROPHILS: 70 % (ref 36–66)
SEGMENTED NEUTROPHILS ABSOLUTE COUNT: 15.81 K/UL (ref 1.8–7.7)
SET RATE: ABNORMAL
SODIUM BLD-SCNC: 141 MMOL/L (ref 135–144)
SPECIFIC GRAVITY UA: 1.03 (ref 1–1.03)
SPECIMEN DESCRIPTION: NORMAL
TEST INFORMATION: NORMAL
TEXT FOR RESPIRATORY: ABNORMAL
THYROXINE, FREE: 0.85 NG/DL (ref 0.93–1.7)
TOTAL CO2, VENOUS: ABNORMAL MMOL/L (ref 23–30)
TOTAL HB: ABNORMAL G/DL (ref 12–16)
TOTAL PROTEIN: 5.9 G/DL (ref 6.4–8.3)
TOTAL RATE: ABNORMAL
TRICHOMONAS: NORMAL
TRICYCLIC ANTIDEPRESSANTS, UR: NORMAL
TROPONIN INTERP: ABNORMAL
TROPONIN T: ABNORMAL NG/ML
TROPONIN, HIGH SENSITIVITY: 35 NG/L (ref 0–22)
TSH SERPL DL<=0.05 MIU/L-ACNC: 0.29 MIU/L (ref 0.3–5)
TURBIDITY: CLEAR
URINE HGB: ABNORMAL
UROBILINOGEN, URINE: NORMAL
VT: ABNORMAL
WBC # BLD: 22.6 K/UL (ref 3.5–11.3)
WBC # BLD: ABNORMAL 10*3/UL
WBC UA: NORMAL /HPF (ref 0–5)
YEAST: NORMAL

## 2021-08-29 PROCEDURE — 6360000002 HC RX W HCPCS: Performed by: STUDENT IN AN ORGANIZED HEALTH CARE EDUCATION/TRAINING PROGRAM

## 2021-08-29 PROCEDURE — 87635 SARS-COV-2 COVID-19 AMP PRB: CPT

## 2021-08-29 PROCEDURE — 84443 ASSAY THYROID STIM HORMONE: CPT

## 2021-08-29 PROCEDURE — 71260 CT THORAX DX C+: CPT

## 2021-08-29 PROCEDURE — 99285 EMERGENCY DEPT VISIT HI MDM: CPT

## 2021-08-29 PROCEDURE — 84439 ASSAY OF FREE THYROXINE: CPT

## 2021-08-29 PROCEDURE — 2580000003 HC RX 258: Performed by: STUDENT IN AN ORGANIZED HEALTH CARE EDUCATION/TRAINING PROGRAM

## 2021-08-29 PROCEDURE — 87449 NOS EACH ORGANISM AG IA: CPT

## 2021-08-29 PROCEDURE — 87086 URINE CULTURE/COLONY COUNT: CPT

## 2021-08-29 PROCEDURE — 87040 BLOOD CULTURE FOR BACTERIA: CPT

## 2021-08-29 PROCEDURE — 6360000004 HC RX CONTRAST MEDICATION: Performed by: STUDENT IN AN ORGANIZED HEALTH CARE EDUCATION/TRAINING PROGRAM

## 2021-08-29 PROCEDURE — 86738 MYCOPLASMA ANTIBODY: CPT

## 2021-08-29 PROCEDURE — 80307 DRUG TEST PRSMV CHEM ANLYZR: CPT

## 2021-08-29 PROCEDURE — 6370000000 HC RX 637 (ALT 250 FOR IP): Performed by: STUDENT IN AN ORGANIZED HEALTH CARE EDUCATION/TRAINING PROGRAM

## 2021-08-29 PROCEDURE — 83605 ASSAY OF LACTIC ACID: CPT

## 2021-08-29 PROCEDURE — 71045 X-RAY EXAM CHEST 1 VIEW: CPT

## 2021-08-29 PROCEDURE — 94640 AIRWAY INHALATION TREATMENT: CPT

## 2021-08-29 PROCEDURE — 93005 ELECTROCARDIOGRAM TRACING: CPT | Performed by: INTERNAL MEDICINE

## 2021-08-29 PROCEDURE — 99223 1ST HOSP IP/OBS HIGH 75: CPT | Performed by: INTERNAL MEDICINE

## 2021-08-29 PROCEDURE — 82140 ASSAY OF AMMONIA: CPT

## 2021-08-29 PROCEDURE — 36415 COLL VENOUS BLD VENIPUNCTURE: CPT

## 2021-08-29 PROCEDURE — 70450 CT HEAD/BRAIN W/O DYE: CPT

## 2021-08-29 PROCEDURE — 85014 HEMATOCRIT: CPT

## 2021-08-29 PROCEDURE — 82805 BLOOD GASES W/O2 SATURATION: CPT

## 2021-08-29 PROCEDURE — 82330 ASSAY OF CALCIUM: CPT

## 2021-08-29 PROCEDURE — 6360000002 HC RX W HCPCS: Performed by: INTERNAL MEDICINE

## 2021-08-29 PROCEDURE — 82803 BLOOD GASES ANY COMBINATION: CPT

## 2021-08-29 PROCEDURE — 81001 URINALYSIS AUTO W/SCOPE: CPT

## 2021-08-29 PROCEDURE — 80053 COMPREHEN METABOLIC PANEL: CPT

## 2021-08-29 PROCEDURE — 2060000000 HC ICU INTERMEDIATE R&B

## 2021-08-29 PROCEDURE — 82565 ASSAY OF CREATININE: CPT

## 2021-08-29 PROCEDURE — 0202U NFCT DS 22 TRGT SARS-COV-2: CPT

## 2021-08-29 PROCEDURE — 85025 COMPLETE CBC W/AUTO DIFF WBC: CPT

## 2021-08-29 PROCEDURE — 84520 ASSAY OF UREA NITROGEN: CPT

## 2021-08-29 PROCEDURE — 74177 CT ABD & PELVIS W/CONTRAST: CPT

## 2021-08-29 PROCEDURE — 80051 ELECTROLYTE PANEL: CPT

## 2021-08-29 PROCEDURE — 82947 ASSAY GLUCOSE BLOOD QUANT: CPT

## 2021-08-29 PROCEDURE — 83880 ASSAY OF NATRIURETIC PEPTIDE: CPT

## 2021-08-29 PROCEDURE — 84484 ASSAY OF TROPONIN QUANT: CPT

## 2021-08-29 PROCEDURE — 2580000003 HC RX 258: Performed by: INTERNAL MEDICINE

## 2021-08-29 RX ORDER — ALBUTEROL SULFATE 2.5 MG/3ML
2.5 SOLUTION RESPIRATORY (INHALATION)
Status: DISCONTINUED | OUTPATIENT
Start: 2021-08-29 | End: 2021-09-01

## 2021-08-29 RX ORDER — DEXTROSE MONOHYDRATE 25 G/50ML
12.5 INJECTION, SOLUTION INTRAVENOUS PRN
Status: DISCONTINUED | OUTPATIENT
Start: 2021-08-29 | End: 2021-09-08 | Stop reason: HOSPADM

## 2021-08-29 RX ORDER — TAMSULOSIN HYDROCHLORIDE 0.4 MG/1
0.4 CAPSULE ORAL DAILY
Status: DISCONTINUED | OUTPATIENT
Start: 2021-08-29 | End: 2021-09-08 | Stop reason: HOSPADM

## 2021-08-29 RX ORDER — SODIUM CHLORIDE 9 MG/ML
INJECTION, SOLUTION INTRAVENOUS CONTINUOUS
Status: DISCONTINUED | OUTPATIENT
Start: 2021-08-29 | End: 2021-08-30

## 2021-08-29 RX ORDER — BUDESONIDE AND FORMOTEROL FUMARATE DIHYDRATE 160; 4.5 UG/1; UG/1
2 AEROSOL RESPIRATORY (INHALATION) 2 TIMES DAILY
Status: DISCONTINUED | OUTPATIENT
Start: 2021-08-29 | End: 2021-09-08 | Stop reason: HOSPADM

## 2021-08-29 RX ORDER — PANTOPRAZOLE SODIUM 40 MG/1
40 TABLET, DELAYED RELEASE ORAL
Status: DISCONTINUED | OUTPATIENT
Start: 2021-08-29 | End: 2021-08-30

## 2021-08-29 RX ORDER — FAMOTIDINE 20 MG/1
20 TABLET, FILM COATED ORAL 2 TIMES DAILY
Status: DISCONTINUED | OUTPATIENT
Start: 2021-08-29 | End: 2021-08-29

## 2021-08-29 RX ORDER — POLYETHYLENE GLYCOL 3350 17 G/17G
17 POWDER, FOR SOLUTION ORAL DAILY PRN
Status: DISCONTINUED | OUTPATIENT
Start: 2021-08-29 | End: 2021-09-03

## 2021-08-29 RX ORDER — ONDANSETRON 2 MG/ML
4 INJECTION INTRAMUSCULAR; INTRAVENOUS EVERY 6 HOURS PRN
Status: DISCONTINUED | OUTPATIENT
Start: 2021-08-29 | End: 2021-09-08 | Stop reason: HOSPADM

## 2021-08-29 RX ORDER — SODIUM CHLORIDE 9 MG/ML
25 INJECTION, SOLUTION INTRAVENOUS PRN
Status: DISCONTINUED | OUTPATIENT
Start: 2021-08-29 | End: 2021-09-08 | Stop reason: HOSPADM

## 2021-08-29 RX ORDER — SUCCINYLCHOLINE CHLORIDE 20 MG/ML
100 INJECTION INTRAMUSCULAR; INTRAVENOUS ONCE
Status: DISCONTINUED | OUTPATIENT
Start: 2021-08-29 | End: 2021-08-29

## 2021-08-29 RX ORDER — IPRATROPIUM BROMIDE AND ALBUTEROL SULFATE 2.5; .5 MG/3ML; MG/3ML
3 SOLUTION RESPIRATORY (INHALATION) EVERY 4 HOURS PRN
Status: DISCONTINUED | OUTPATIENT
Start: 2021-08-29 | End: 2021-09-08 | Stop reason: HOSPADM

## 2021-08-29 RX ORDER — SODIUM CHLORIDE 0.9 % (FLUSH) 0.9 %
5-40 SYRINGE (ML) INJECTION PRN
Status: DISCONTINUED | OUTPATIENT
Start: 2021-08-29 | End: 2021-09-08 | Stop reason: HOSPADM

## 2021-08-29 RX ORDER — NICOTINE POLACRILEX 4 MG
15 LOZENGE BUCCAL PRN
Status: DISCONTINUED | OUTPATIENT
Start: 2021-08-29 | End: 2021-09-08 | Stop reason: HOSPADM

## 2021-08-29 RX ORDER — LANOLIN ALCOHOL/MO/W.PET/CERES
325 CREAM (GRAM) TOPICAL 2 TIMES DAILY
Status: DISCONTINUED | OUTPATIENT
Start: 2021-08-29 | End: 2021-09-08 | Stop reason: HOSPADM

## 2021-08-29 RX ORDER — ACETAMINOPHEN 325 MG/1
650 TABLET ORAL EVERY 6 HOURS PRN
Status: DISCONTINUED | OUTPATIENT
Start: 2021-08-29 | End: 2021-09-08 | Stop reason: HOSPADM

## 2021-08-29 RX ORDER — LACTOBACILLUS RHAMNOSUS GG 10B CELL
1 CAPSULE ORAL 2 TIMES DAILY
Status: DISCONTINUED | OUTPATIENT
Start: 2021-08-29 | End: 2021-09-08 | Stop reason: HOSPADM

## 2021-08-29 RX ORDER — PROPOFOL 10 MG/ML
INJECTION, EMULSION INTRAVENOUS
Status: DISPENSED
Start: 2021-08-29 | End: 2021-08-29

## 2021-08-29 RX ORDER — INSULIN GLARGINE 100 [IU]/ML
25 INJECTION, SOLUTION SUBCUTANEOUS
Status: DISCONTINUED | OUTPATIENT
Start: 2021-08-29 | End: 2021-09-04

## 2021-08-29 RX ORDER — ALBUTEROL SULFATE 90 UG/1
2 AEROSOL, METERED RESPIRATORY (INHALATION) EVERY 6 HOURS PRN
Status: DISCONTINUED | OUTPATIENT
Start: 2021-08-29 | End: 2021-09-08 | Stop reason: HOSPADM

## 2021-08-29 RX ORDER — ETOMIDATE 2 MG/ML
20 INJECTION INTRAVENOUS ONCE
Status: DISCONTINUED | OUTPATIENT
Start: 2021-08-29 | End: 2021-08-29

## 2021-08-29 RX ORDER — PROPOFOL 10 MG/ML
5-50 INJECTION, EMULSION INTRAVENOUS ONCE
Status: DISCONTINUED | OUTPATIENT
Start: 2021-08-29 | End: 2021-08-29

## 2021-08-29 RX ORDER — SODIUM CHLORIDE 0.9 % (FLUSH) 0.9 %
5-40 SYRINGE (ML) INJECTION EVERY 12 HOURS SCHEDULED
Status: DISCONTINUED | OUTPATIENT
Start: 2021-08-29 | End: 2021-09-08 | Stop reason: HOSPADM

## 2021-08-29 RX ORDER — ONDANSETRON 4 MG/1
4 TABLET, ORALLY DISINTEGRATING ORAL EVERY 8 HOURS PRN
Status: DISCONTINUED | OUTPATIENT
Start: 2021-08-29 | End: 2021-09-08 | Stop reason: HOSPADM

## 2021-08-29 RX ORDER — ACETAMINOPHEN 650 MG/1
650 SUPPOSITORY RECTAL EVERY 6 HOURS PRN
Status: DISCONTINUED | OUTPATIENT
Start: 2021-08-29 | End: 2021-09-08 | Stop reason: HOSPADM

## 2021-08-29 RX ORDER — DEXTROSE MONOHYDRATE 50 MG/ML
100 INJECTION, SOLUTION INTRAVENOUS PRN
Status: DISCONTINUED | OUTPATIENT
Start: 2021-08-29 | End: 2021-09-08 | Stop reason: HOSPADM

## 2021-08-29 RX ADMIN — AMITRIPTYLINE HYDROCHLORIDE 75 MG: 50 TABLET, FILM COATED ORAL at 21:25

## 2021-08-29 RX ADMIN — SODIUM CHLORIDE, PRESERVATIVE FREE 10 ML: 5 INJECTION INTRAVENOUS at 21:26

## 2021-08-29 RX ADMIN — FERROUS SULFATE TAB EC 325 MG (65 MG FE EQUIVALENT) 325 MG: 325 (65 FE) TABLET DELAYED RESPONSE at 21:26

## 2021-08-29 RX ADMIN — APIXABAN 5 MG: 5 TABLET, FILM COATED ORAL at 21:26

## 2021-08-29 RX ADMIN — CEFEPIME 2000 MG: 2 INJECTION, POWDER, FOR SOLUTION INTRAVENOUS at 21:25

## 2021-08-29 RX ADMIN — BUDESONIDE AND FORMOTEROL FUMARATE DIHYDRATE 2 PUFF: 160; 4.5 AEROSOL RESPIRATORY (INHALATION) at 20:34

## 2021-08-29 RX ADMIN — PIPERACILLIN AND TAZOBACTAM 4500 MG: 4; .5 INJECTION, POWDER, LYOPHILIZED, FOR SOLUTION INTRAVENOUS; PARENTERAL at 14:37

## 2021-08-29 RX ADMIN — IOPAMIDOL 75 ML: 755 INJECTION, SOLUTION INTRAVENOUS at 12:47

## 2021-08-29 RX ADMIN — VANCOMYCIN HYDROCHLORIDE 750 MG: 10 INJECTION, POWDER, LYOPHILIZED, FOR SOLUTION INTRAVENOUS at 16:10

## 2021-08-29 RX ADMIN — SODIUM CHLORIDE: 9 INJECTION, SOLUTION INTRAVENOUS at 19:36

## 2021-08-29 RX ADMIN — Medication 1 CAPSULE: at 21:26

## 2021-08-29 NOTE — ED NOTES
Pt to ED d/t AMS. Pt is at SNF to get IV abx for infected leg wound. Pt was found to be only responsive to verbal stimuli only. SNF was concerned for possible overdosing of Ativan. Pt is normally alert and oriented x 4. Pt placed on full cardiac monitor. EKG obtained. Blood cultures drawn. Possible intubation. Dr. Dhara Tang and Mac Gomez at bedside.        Sania Gill RN  08/29/21 9646
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
## 2021-08-29 NOTE — H&P
Attending Supervising Physicians Attestation Statement  I have seen and examined Jacob Nair and the villela elements of all parts of the encounter have been performed by me. I agree with the assessment, plan and orders as documented by the Advanced Practice Provider. I discussed the findings and plans with the resident physician and agree as documented in their note . See note of admitting resident for more details    In addition to the pertinent positives and negatives as stated within HPI and the review of systems as documented in the notes, all other systems were reviewed when able to and are reported negative. Additional Comments:   63 M presented with AMS  Initial plans for intubation, held given improvement in mentation  Scans concerning for PNA    Principal Problem:    Pneumonia of right lower lobe due to infectious organism  Active Problems:    Type 2 diabetes mellitus with diabetic polyneuropathy, with long-term current use of insulin (HCC)    Esophageal cancer (HCC)    COPD without exacerbation ([address]    HLD (hyperlipidemia)    PAD (peripheral artery disease) ([address]    S/P BKA (below knee amputation) bilateral (HCC)    Moderate malnutrition ([address]    History of below knee amputation, right ([address]    History of below knee amputation, left ([address]    Metabolic encephalopathy  Resolved Problems:    * No resolved hospital problems.  *    - antibiotics  - RT eval, aerosol protocol  - supplemental O2  - follow up cultures   - monitor glucose  - resume home meds     Electronically signed by Kadeem Crowder MD on 8/29/2021 at 6:14 PM

## 2021-08-29 NOTE — ED PROVIDER NOTES
[facility] ED     Emergency Department     Faculty Attestation        I performed a history and physical examination of the patient and discussed management with the resident. I reviewed the residents note and agree with the documented findings and plan of care. Any areas of disagreement are noted on the chart. I was personally present for the key portions of any procedures. I have documented in the chart those procedures where I was not present during the key portions. I have reviewed the emergency nurses triage note. I agree with the chief complaint, past medical history, past surgical history, allergies, medications, social and family history as documented unless otherwise noted below. For Physician Assistant/ Nurse Practitioner cases/documentation I have personally evaluated this patient and have completed at least one if not all key elements of the E/M (history, physical exam, and MDM). Additional findings are as noted. PCP:  Coco Carmona, DO    Pertinent Comments:     Patient is a 70-year-old male with history of hypertension diabetes as well as COPD and recent infection of his left lower extremity stump on vancomycin at this time. Patient had become essentially unresponsive at unknown exact time at facility this morning. Patient may have received 1 extra dose of Ativan 1 mg by mouth several hours ago, however that should have easily worn off at this time. Unfortunately patient with decreased mental status and lung sounds on the right suspicious for aspiration. Patient with no intelligible speech and appears to be withdrawing to pain only with no purposeful movement. Appears to withdraw with all 4 extremities however and face is symmetric. Assessment/plan: Altered mental status with possible multiple factors affecting.    Will protect airway with intubation and obviously altered mental status work-up including CTs and multiple used in the form appropriate to the circumstances.)    MD Dasia Lui  Attending Emergency Medicine Physician             Kathy Chou MD  08/29/21 1503 Aki Velasquez MD  08/29/21 1143

## 2021-08-29 NOTE — ED NOTES
While obtaining Covid swab, patient began to wake up and follow commands. Pt remains sleepy but is able to be aroused.      Reba Bishop RN  08/29/21 [id number]

## 2021-08-29 NOTE — ED PROVIDER NOTES
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 origin. No pneumothorax. Upper Abdomen: Limited images of the upper abdomen are unremarkable. Soft Tissues/Bones: No acute bone or soft tissue abnormality. No acute pulmonary artery embolism. Postsurgical changes secondary to esophagectomy and gastric pull-through. Bilateral lower lobe scarring and round atelectasis. New diffuse tree-in-bud nodularity throughout the right upper and right lower lobe with additional right lower lobe peribronchial thickening and increasing subpleural airspace disease and peribronchial thickening all suggesting potential infectious/inflammatory etiology. Recommend short-term interval follow-up evaluation to ensure stability or resolution.        EKG  EKG Interpretation    Interpreted by me    Rhythm: normal sinus   Rate: normal  Axis: normal  Ectopy: none  Conduction: normal  ST Segments: no acute change  T Waves: no acute change  Q Waves: none    Clinical Impression: no acute changes and normal EKG    All EKG's are interpreted by the Emergency Department Physician who either signs or Co-signs this chart in the absence of a cardiologist.    EMERGENCY DEPARTMENT COURSE:  ED Course as of Aug 29 1934   Maddie Venegas Aug 29, 2021   1148 Patient arrives from [address] home by EMS due to concerns of altered mental status. Patient found nonresponsive today. Per EMS concern for unintentional Ativan overdose as patient has been receiving this at the nursing home for unknown reason. [DS]   2150 EZLFR present right lower lung field, concern for aspiration. GCS 8 decision made to intubate for airway protection. [DS]   200 Spoke with daughter on phone who states patient was at nursing home for vancomycin infusion for his right lower extremity cellulitis after having BKA. She states patient does not take Ativan normally at home. And is adamant that the nursing home has been giving the patient more Ativan than what they are reporting    [DS]   1150 After receiving Covid swab patient woke up, patient able to answer questions but does still appear confused, he does follow commands. Will hold off on intubation at this point. [DS]      ED Course User Index  [DS] Arcadio Mansfield, DO     Patient's father at bedside, patient reassessed and still appears confused however does respond to nasal swab and sternal rub but quickly returns to sleep. Patient still maintaining oxygen saturation on nasal cannula as well as maintaining airway. Imaging does show concern for right sided pneumonia, white blood cell count elevated at 23,000. Given history of altered mental status and dried vomitus around mouth with imaging study concern for aspiration pneumonia. Will start on broad-spectrum antibiotics. CT head normal, CT abdomen/pelvis normal. TSH and T4 low. Ammonia normal, troponin and BNP unremarkable. Electrolytes normal. Medicine was paged for admission of the patient. Medicine came and assessed the patient and patient is still nonresponsive, they did accept admission of the patient.  While the patient was boarding I was called to the room by the RN and patient appears significantly more awake, is answering my questions appropriately and states he thinks the nursing facility has been intentionally giving him Ativan to make him sleep so they will not have to deal with him. GCS 15 upon reassessment. Patient is admitted to medicine. Per family's request they request he not return to the nursing facility from where he came. PROCEDURES:  None    CONSULTS:  PHARMACY TO DOSE VANCOMYCIN  IP CONSULT TO INTERNAL MEDICINE  IP CONSULT TO CASE MANAGEMENT  PHARMACY TO DOSE VANCOMYCIN  IP CONSULT TO DIETITIAN    CRITICAL CARE:  Please see attending note    FINAL IMPRESSION      1. Altered mental status, unspecified altered mental status type          DISPOSITION / [address] Admitted 08/29/2021 02:21:01 PM        PATIENTREFERRED TO:  No follow-up provider specified.     DISCHARGE MEDICATIONS:  Current Discharge Medication List          Melina Montes DO  EmergencyMedicine Resident    (Please note that portions of this note were completed with a voice recognition program.  Efforts were made to edit the dictations but occasionally words are mis-transcribed.)        Melina Montes DO  Resident  08/29/21 [id number]

## 2021-08-29 NOTE — ED PROVIDER NOTES
[facility] ED  Emergency Department  Faculty Sign-Out Addendum     Care of Kylie Zavala was assumed from previous attending and is being seen for Altered Mental Status  . The patient's initial evaluation and plan have been discussed with the prior provider who initially evaluated the patient. Handoff taken on this patient patient from prior Attending Physician: Dr. Magnolia Mcpherson    I was available and discussed any additional care issues that arose and coordinated the management plans with the resident(s) caring for the patient during my duty period. Any areas of disagreement with residents documentation of care or procedures are noted on the chart. I was personally present for the key portions of any/all procedures performed during my duty period.     EMERGENCY DEPARTMENT COURSE / MEDICAL DECISION MAKING:       MEDICATIONS GIVEN:  Orders Placed This Encounter   Medications    propofol 1000 MG/100ML injection     BRIANNA KENNEDY: cabinet override    DISCONTD: propofol injection    DISCONTD: etomidate (AMIDATE) injection 20 mg    DISCONTD: succinylcholine (ANECTINE) injection 100 mg    iopamidol (ISOVUE-370) 76 % injection 75 mL    piperacillin-tazobactam (ZOSYN) 4,500 mg in dextrose 5 % 100 mL IVPB (mini-bag)     Order Specific Question:   Antimicrobial Indications     Answer:   Aspiration Pneumonia    vancomycin (VANCOCIN) 750 mg in dextrose 5 % 250 mL IVPB     Order Specific Question:   Antimicrobial Indications     Answer:   Aspiration Pneumonia    vancomycin (VANCOCIN) intermittent dosing (placeholder)     Order Specific Question:   Antimicrobial Indications     Answer:   Aspiration Pneumonia       LABS / RADIOLOGY:     Labs Reviewed   CBC WITH AUTO DIFFERENTIAL - Abnormal; Notable for the following components:       Result Value    WBC 22.6 (*)     RBC 3.67 (*)     Hemoglobin 9.6 (*)     Hematocrit 31.9 (*)     RDW 16.4 (*)     Immature Granulocytes 1 (*)     Seg Neutrophils 70 (*) Lymphocytes 21 (*)     Segs Absolute 15.81 (*)     Absolute Mono # 1.58 (*)     All other components within normal limits   COMPREHENSIVE METABOLIC PANEL W/ REFLEX TO MG FOR LOW K - Abnormal; Notable for the following components:    BUN 34 (*)     Chloride 114 (*)     CO2 15 (*)     Alkaline Phosphatase 150 (*)     Total Bilirubin 0.19 (*)     Total Protein 5.9 (*)     Albumin 2.9 (*)     All other components within normal limits   URINALYSIS - Abnormal; Notable for the following components:    Ketones, Urine TRACE (*)     Urine Hgb LARGE (*)     Protein, UA 3+ (*)     All other components within normal limits   TROPONIN - Abnormal; Notable for the following components:    Troponin, High Sensitivity 35 (*)     All other components within normal limits   BRAIN NATRIURETIC PEPTIDE - Abnormal; Notable for the following components:    Pro- (*)     All other components within normal limits   TSH WITH REFLEX - Abnormal; Notable for the following components:    TSH 0.29 (*)     All other components within normal limits   ELECTROLYTES PLUS - Abnormal; Notable for the following components:    POC Chloride 118 (*)     POC TCO2 19 (*)     All other components within normal limits   HGB/HCT - Abnormal; Notable for the following components:    POC Hemoglobin 10.5 (*)     POC Hematocrit 31 (*)     All other components within normal limits   CALCIUM, IONIC (POC) - Abnormal; Notable for the following components:    POC Ionized Calcium 1.43 (*)     All other components within normal limits   T4, FREE - Abnormal; Notable for the following components:    Thyroxine, Free 0.85 (*)     All other components within normal limits   VENOUS BLOOD GAS, POINT OF CARE - Abnormal; Notable for the following components:    pH, Manohar 7.218 (*)     pO2, Manohar 59.7 (*)     HCO3, Venous 17.6 (*)     Negative Base Excess, Manohar 10 (*)     All other components within normal limits   CREATININE W/GFR POINT OF CARE - Abnormal; Notable for the following components:    POC Creatinine 1.25 (*)     GFR Non- 58 (*)     All other components within normal limits   UREA N (POC) - Abnormal; Notable for the following components:    POC BUN 34 (*)     All other components within normal limits   CULTURE, BLOOD 1   CULTURE, BLOOD 2   COVID-19, RAPID   CULTURE, URINE   LACTATE, SEPSIS   AMMONIA   MICROSCOPIC URINALYSIS   LACTATE, SEPSIS   LACTIC ACID,POINT OF CARE   POCT GLUCOSE       CT HEAD WO CONTRAST    Result Date: 8/29/2021  EXAMINATION: CT OF THE HEAD WITHOUT CONTRAST  8/29/2021 12:43 pm TECHNIQUE: CT of the head was performed without the administration of intravenous contrast. Dose modulation, iterative reconstruction, and/or weight based adjustment of the mA/kV was utilized to reduce the radiation dose to as low as reasonably achievable. COMPARISON: December 26, 2020 HISTORY: ORDERING SYSTEM PROVIDED HISTORY: AMS TECHNOLOGIST PROVIDED HISTORY: AMS Decision Support Exception - unselect if not a suspected or confirmed emergency medical condition->Emergency Medical Condition (MA) FINDINGS: BRAIN/VENTRICLES: There is no evidence of intracranial hemorrhage or cortical based infarct. No mass effect or midline shift. No abnormal extra-axial fluid collections. Periventricular and deep white matter hypodensities are indeterminate although suggest chronic small vessel ischemic disease. No findings of acute hydrocephalus. ORBITS: The visualized portion of the orbits demonstrate no acute abnormality. SINUSES: The visualized paranasal sinuses and mastoid air cells demonstrate no acute abnormality. SOFT TISSUES/SKULL:  No acute abnormality of the visualized skull or soft tissues. No acute intracranial abnormality.      CT ABDOMEN PELVIS W IV CONTRAST Additional Contrast? None    Result Date: 8/29/2021  EXAMINATION: CT OF THE ABDOMEN AND PELVIS WITH CONTRAST, 8/29/2021 12:43 pm TECHNIQUE: CT of the abdomen and pelvis was performed with the administration of intravenous contrast. Multiplanar reformatted images are provided for review. Dose modulation, iterative reconstruction, and/or weight based adjustment of the mA/kV was utilized to reduce the radiation dose to as low as reasonably achievable. COMPARISON: May 27, 2021 HISTORY: ORDERING SYSTEM PROVIDED HISTORY:  Eval for intraabdominal infection. TECHNOLOGIST PROVIDED HISTORY: Eval for intraabdominal infection. Decision Support Exception - unselect if not a suspected or confirmed emergency medical condition->Emergency Medical Condition (MA). FINDINGS: Lower Chest: Refer to chest CT imaging same date. Organs: The liver is enlarged measuring 20 cm. The spleen, gallbladder, and left adrenal gland are normal.  The right adrenal gland demonstrates a stable 19 mm nodule suggesting adrenal adenoma. The kidneys enhance symmetrically. There are stable bilateral renal cysts. Nonobstructing stones within the left renal upper pole are stable measuring approximately 7-8 mm. Nonobstructing calculi within the right renal midpole measuring approximately 6-7 mm. No hydronephrosis. The pancreas is intact. GI/Bowel: No bowel obstruction or bowel wall inflammation. The appendix is normal. Pelvis: The bladder is decompressed with Quesada catheter. The rectum is normal. Peritoneum/Retroperitoneum: No intraperitoneal free air or free fluid. No evidence of mesenteric or retroperitoneal lymphadenopathy. Bones/Soft Tissues: No suspicious lytic or blastic osseous lesion. Eventration of the ventral abdominal wall is stable. Bilateral nephrolithiasis. Bilateral renal cysts. Hepatomegaly.      XR CHEST PORTABLE    Result Date: 8/29/2021  EXAMINATION: ONE XRAY VIEW OF THE CHEST 8/29/2021 11:31 am COMPARISON: 08/10/2021 HISTORY: ORDERING SYSTEM PROVIDED HISTORY: AMS, crackles right side, concern for aspiration TECHNOLOGIST PROVIDED HISTORY: AMS, crackles right side, concern for aspiration FINDINGS: Multifocal bilateral interstitial and airspace opacities confluent within the lower lobes. The heart is mildly enlarged. There is a large hiatal hernia. Right PICC line catheter is stable. Bilateral interstitial and airspace disease representing any form of asymmetric edema, multifocal infection, or pneumonitis. CT CHEST PULMONARY EMBOLISM W CONTRAST    Result Date: 8/29/2021  EXAMINATION: CTA OF THE CHEST 8/29/2021 12:43 pm TECHNIQUE: CTA of the chest was performed after the administration of intravenous contrast.  Multiplanar reformatted images are provided for review. MIP images are provided for review. Dose modulation, iterative reconstruction, and/or weight based adjustment of the mA/kV was utilized to reduce the radiation dose to as low as reasonably achievable. COMPARISON: December 18, 2020 HISTORY: ORDERING SYSTEM PROVIDED HISTORY: eval for PE TECHNOLOGIST PROVIDED HISTORY: eval for PE Decision Support Exception - unselect if not a suspected or confirmed emergency medical condition->Emergency Medical Condition (MA) Reason for Exam: eval for intraabdominal infection FINDINGS: Pulmonary Arteries: Pulmonary arteries are adequately opacified for evaluation. No evidence of intraluminal filling defect to suggest pulmonary embolism. Main pulmonary artery is normal in caliber. Mediastinum: The heart is enlarged without pericardial effusion. The aorta demonstrates stable course and caliber. There are small nonenlarged mediastinal and bilateral hilar lymph nodes too small to further characterize. There are postsurgical changes secondary to esophagectomy and gastric pull-through. The findings are stable from previous evaluation. Lungs/pleura: The central airways are patent. Redemonstration bilateral lower lobe scarring and round atelectasis. Since the prior examination there has been interval development of multiple nodules in a tree-in-bud configuration throughout the right upper lobe and superior segment of the right lower lobe. Increasing airspace disease within the right lower lobe compared to prior examination. The findings suggest a post infectious or inflammatory origin. No pneumothorax. Upper Abdomen: Limited images of the upper abdomen are unremarkable. Soft Tissues/Bones: No acute bone or soft tissue abnormality. No acute pulmonary artery embolism. Postsurgical changes secondary to esophagectomy and gastric pull-through. Bilateral lower lobe scarring and round atelectasis. New diffuse tree-in-bud nodularity throughout the right upper and right lower lobe with additional right lower lobe peribronchial thickening and increasing subpleural airspace disease and peribronchial thickening all suggesting potential infectious/inflammatory etiology. Recommend short-term interval follow-up evaluation to ensure stability or resolution. RECENT VITALS:     Temp: 97.3 °F (36.3 °C),  Pulse: 57, Resp: 22, BP: (!) 96/47, SpO2: 95 %    This patient is a 63 y.o. Male with altered mentation. Initially almost intubated but then started to arouse with stimulus. Low BP. Labs and imaging reviewed. AMS of likely multifactorial etiology. Admission planned. Protecting airway. VS reviewed    OUTSTANDING TASKS / RECOMMENDATIONS:    1.  Admission/bed assignment    Johanne Kraus DO  Attending Emergency Physician  [facility]        Dee Dee Muñoz DO  08/29/21 4488

## 2021-08-29 NOTE — PROGRESS NOTES
Pharmacy Note  Vancomycin Consult    Christos Chowdhury is a 63 y.o. male started on Vancomycin for aspiration pneumonia; consult received from Dr. Mayo Mohr to manage therapy. Also receiving the following antibiotics: zosyn.     Patient Active Problem List   Diagnosis    Type 2 diabetes mellitus with diabetic polyneuropathy, with long-term current use of insulin (HCC)    Neuropathic pain, leg    Diabetic polyneuropathy (HCC)    Allergic rhinitis    Tobacco dependence    Edentulous    Dysphagia    Lung nodules    Esophageal cancer (HCC)    Fracture of humerus, left, closed    Closed fracture of humerus    DM type 2 with diabetic peripheral neuropathy (HCC)    COPD without exacerbation (HCC)    HLD (hyperlipidemia)    Gastroesophageal reflux disease    Chronic pain syndrome    Marijuana use    History of colon polyps    Functional diarrhea    Sepsis due to methicillin resistant Staphylococcus aureus (MRSA) (HCC)    History of esophageal cancer    Osteomyelitis, chronic, ankle or foot ([address]    PAD (peripheral artery disease) ([address]    Other pulmonary embolism without acute cor pulmonale (HCC)    Carotid stenosis, asymptomatic, bilateral    Lower limb amputation status    Fx humeral neck, right, closed, initial encounter    Transient hypotension    Constipation due to opioid therapy    Acute kidney injury ([address]    Complication of below knee amputation stump (HCC)    COPD exacerbation (HCC)    Hyponatremia    Pain, phantom limb ([facility])    S/P BKA (below knee amputation) bilateral (HCC)    Moderate protein malnutrition ([address]    Essential hypertension    Uncontrolled type 2 diabetes mellitus with hyperglycemia ([address]    History of pulmonary embolism - 2017    Atelectasis    Uncontrolled type 2 diabetes mellitus with foot ulcer (HCC)    Azotemia    Anemia, normocytic normochromic    Drug-induced constipation    Osteomyelitis of metatarsals of left foot ([address]    Diabetic foot infection ([address]    Noncompliance    Type 1 diabetes

## 2021-08-29 NOTE — ED NOTES
Portable xray at bedside     [facility] LIZETH\[facility] [place] [facility]\"" ST.JOAQUÍN, RN  08/29/21 2100

## 2021-08-30 ENCOUNTER — APPOINTMENT (OUTPATIENT)
Dept: GENERAL RADIOLOGY | Age: 66
DRG: 177 | End: 2021-08-30
Payer: MEDICARE

## 2021-08-30 LAB
ABSOLUTE EOS #: 0.81 K/UL (ref 0–0.44)
ABSOLUTE IMMATURE GRANULOCYTE: 0.06 K/UL (ref 0–0.3)
ABSOLUTE LYMPH #: 1.99 K/UL (ref 1.1–3.7)
ABSOLUTE MONO #: 1.01 K/UL (ref 0.1–1.2)
ANION GAP SERPL CALCULATED.3IONS-SCNC: 9 MMOL/L (ref 9–17)
BASOPHILS # BLD: 1 % (ref 0–2)
BASOPHILS ABSOLUTE: 0.11 K/UL (ref 0–0.2)
BUN BLDV-MCNC: 24 MG/DL (ref 8–23)
BUN/CREAT BLD: ABNORMAL (ref 9–20)
CALCIUM SERPL-MCNC: 8.1 MG/DL (ref 8.6–10.4)
CHLORIDE BLD-SCNC: 112 MMOL/L (ref 98–107)
CO2: 16 MMOL/L (ref 20–31)
CREAT SERPL-MCNC: 1.05 MG/DL (ref 0.7–1.2)
CULTURE: NO GROWTH
DIFFERENTIAL TYPE: ABNORMAL
EKG ATRIAL RATE: 60 BPM
EKG P AXIS: 53 DEGREES
EKG P-R INTERVAL: 152 MS
EKG Q-T INTERVAL: 422 MS
EKG QRS DURATION: 86 MS
EKG QTC CALCULATION (BAZETT): 422 MS
EKG R AXIS: 57 DEGREES
EKG T AXIS: 67 DEGREES
EKG VENTRICULAR RATE: 60 BPM
EOSINOPHILS RELATIVE PERCENT: 6 % (ref 1–4)
GFR AFRICAN AMERICAN: >60 ML/MIN
GFR NON-AFRICAN AMERICAN: >60 ML/MIN
GFR SERPL CREATININE-BSD FRML MDRD: ABNORMAL ML/MIN/{1.73_M2}
GFR SERPL CREATININE-BSD FRML MDRD: ABNORMAL ML/MIN/{1.73_M2}
GLUCOSE BLD-MCNC: 104 MG/DL (ref 75–110)
GLUCOSE BLD-MCNC: 121 MG/DL (ref 75–110)
GLUCOSE BLD-MCNC: 158 MG/DL (ref 75–110)
GLUCOSE BLD-MCNC: 297 MG/DL (ref 70–99)
HCT VFR BLD CALC: 32 % (ref 40.7–50.3)
HEMOGLOBIN: 9.8 G/DL (ref 13–17)
IMMATURE GRANULOCYTES: 0 %
LACTIC ACID, WHOLE BLOOD: 0.8 MMOL/L (ref 0.7–2.1)
LACTIC ACID, WHOLE BLOOD: 0.9 MMOL/L (ref 0.7–2.1)
LACTIC ACID, WHOLE BLOOD: 1 MMOL/L (ref 0.7–2.1)
LACTIC ACID, WHOLE BLOOD: 1.1 MMOL/L (ref 0.7–2.1)
LACTIC ACID: NORMAL MMOL/L
LEGIONELLA PNEUMOPHILIA AG, URINE: NEGATIVE
LYMPHOCYTES # BLD: 14 % (ref 24–43)
Lab: NORMAL
MAGNESIUM: 1.9 MG/DL (ref 1.6–2.6)
MCH RBC QN AUTO: 26.1 PG (ref 25.2–33.5)
MCHC RBC AUTO-ENTMCNC: 30.6 G/DL (ref 28.4–34.8)
MCV RBC AUTO: 85.3 FL (ref 82.6–102.9)
MONOCYTES # BLD: 7 % (ref 3–12)
MYCOPLASMA PNEUMONIAE IGM: 0.26
NRBC AUTOMATED: 0 PER 100 WBC
PDW BLD-RTO: 16.7 % (ref 11.8–14.4)
PLATELET # BLD: 286 K/UL (ref 138–453)
PLATELET ESTIMATE: ABNORMAL
PMV BLD AUTO: 10.6 FL (ref 8.1–13.5)
POTASSIUM SERPL-SCNC: 3.4 MMOL/L (ref 3.7–5.3)
RBC # BLD: 3.75 M/UL (ref 4.21–5.77)
RBC # BLD: ABNORMAL 10*6/UL
SEG NEUTROPHILS: 72 % (ref 36–65)
SEGMENTED NEUTROPHILS ABSOLUTE COUNT: 10.03 K/UL (ref 1.5–8.1)
SODIUM BLD-SCNC: 137 MMOL/L (ref 135–144)
SPECIMEN DESCRIPTION: NORMAL
VANCOMYCIN RANDOM DATE LAST DOSE: NORMAL
VANCOMYCIN RANDOM DOSE AMOUNT: NORMAL
VANCOMYCIN RANDOM TIME LAST DOSE: NORMAL
VANCOMYCIN RANDOM: 23.2 UG/ML
WBC # BLD: 14 K/UL (ref 3.5–11.3)
WBC # BLD: ABNORMAL 10*3/UL

## 2021-08-30 PROCEDURE — 83735 ASSAY OF MAGNESIUM: CPT

## 2021-08-30 PROCEDURE — 2580000003 HC RX 258: Performed by: INTERNAL MEDICINE

## 2021-08-30 PROCEDURE — 6360000002 HC RX W HCPCS: Performed by: STUDENT IN AN ORGANIZED HEALTH CARE EDUCATION/TRAINING PROGRAM

## 2021-08-30 PROCEDURE — 97535 SELF CARE MNGMENT TRAINING: CPT

## 2021-08-30 PROCEDURE — 80048 BASIC METABOLIC PNL TOTAL CA: CPT

## 2021-08-30 PROCEDURE — 6370000000 HC RX 637 (ALT 250 FOR IP)

## 2021-08-30 PROCEDURE — 2060000000 HC ICU INTERMEDIATE R&B

## 2021-08-30 PROCEDURE — 2580000003 HC RX 258: Performed by: STUDENT IN AN ORGANIZED HEALTH CARE EDUCATION/TRAINING PROGRAM

## 2021-08-30 PROCEDURE — 85025 COMPLETE CBC W/AUTO DIFF WBC: CPT

## 2021-08-30 PROCEDURE — 71045 X-RAY EXAM CHEST 1 VIEW: CPT

## 2021-08-30 PROCEDURE — 36415 COLL VENOUS BLD VENIPUNCTURE: CPT

## 2021-08-30 PROCEDURE — 99232 SBSQ HOSP IP/OBS MODERATE 35: CPT | Performed by: INTERNAL MEDICINE

## 2021-08-30 PROCEDURE — 82947 ASSAY GLUCOSE BLOOD QUANT: CPT

## 2021-08-30 PROCEDURE — 94640 AIRWAY INHALATION TREATMENT: CPT

## 2021-08-30 PROCEDURE — 6370000000 HC RX 637 (ALT 250 FOR IP): Performed by: STUDENT IN AN ORGANIZED HEALTH CARE EDUCATION/TRAINING PROGRAM

## 2021-08-30 PROCEDURE — 6360000002 HC RX W HCPCS: Performed by: INTERNAL MEDICINE

## 2021-08-30 PROCEDURE — 93010 ELECTROCARDIOGRAM REPORT: CPT | Performed by: INTERNAL MEDICINE

## 2021-08-30 PROCEDURE — 97162 PT EVAL MOD COMPLEX 30 MIN: CPT

## 2021-08-30 PROCEDURE — 74230 X-RAY XM SWLNG FUNCJ C+: CPT

## 2021-08-30 PROCEDURE — 80202 ASSAY OF VANCOMYCIN: CPT

## 2021-08-30 PROCEDURE — 97166 OT EVAL MOD COMPLEX 45 MIN: CPT

## 2021-08-30 PROCEDURE — 92611 MOTION FLUOROSCOPY/SWALLOW: CPT

## 2021-08-30 PROCEDURE — 97530 THERAPEUTIC ACTIVITIES: CPT

## 2021-08-30 PROCEDURE — 83605 ASSAY OF LACTIC ACID: CPT

## 2021-08-30 RX ORDER — FAMOTIDINE 20 MG/1
20 TABLET, FILM COATED ORAL 2 TIMES DAILY
Status: DISCONTINUED | OUTPATIENT
Start: 2021-08-30 | End: 2021-09-08 | Stop reason: HOSPADM

## 2021-08-30 RX ORDER — VANCOMYCIN HYDROCHLORIDE 1 G/200ML
1000 INJECTION, SOLUTION INTRAVENOUS EVERY 24 HOURS
Status: DISCONTINUED | OUTPATIENT
Start: 2021-08-31 | End: 2021-09-05

## 2021-08-30 RX ORDER — LANOLIN ALCOHOL/MO/W.PET/CERES
3 CREAM (GRAM) TOPICAL
Status: COMPLETED | OUTPATIENT
Start: 2021-08-30 | End: 2021-08-30

## 2021-08-30 RX ORDER — POTASSIUM CHLORIDE 20 MEQ/1
20 TABLET, EXTENDED RELEASE ORAL ONCE
Status: COMPLETED | OUTPATIENT
Start: 2021-08-30 | End: 2021-08-30

## 2021-08-30 RX ORDER — LANOLIN ALCOHOL/MO/W.PET/CERES
3 CREAM (GRAM) TOPICAL
Status: DISCONTINUED | OUTPATIENT
Start: 2021-08-30 | End: 2021-08-30

## 2021-08-30 RX ADMIN — BUDESONIDE AND FORMOTEROL FUMARATE DIHYDRATE 2 PUFF: 160; 4.5 AEROSOL RESPIRATORY (INHALATION) at 07:57

## 2021-08-30 RX ADMIN — SODIUM CHLORIDE, PRESERVATIVE FREE 6 ML: 5 INJECTION INTRAVENOUS at 10:14

## 2021-08-30 RX ADMIN — TAMSULOSIN HYDROCHLORIDE 0.4 MG: 0.4 CAPSULE ORAL at 10:14

## 2021-08-30 RX ADMIN — POTASSIUM CHLORIDE 20 MEQ: 1500 TABLET, EXTENDED RELEASE ORAL at 10:16

## 2021-08-30 RX ADMIN — INSULIN GLARGINE 25 UNITS: 100 INJECTION, SOLUTION SUBCUTANEOUS at 17:12

## 2021-08-30 RX ADMIN — Medication 1 CAPSULE: at 10:10

## 2021-08-30 RX ADMIN — FERROUS SULFATE TAB EC 325 MG (65 MG FE EQUIVALENT) 325 MG: 325 (65 FE) TABLET DELAYED RESPONSE at 21:33

## 2021-08-30 RX ADMIN — FAMOTIDINE 20 MG: 20 TABLET, FILM COATED ORAL at 17:11

## 2021-08-30 RX ADMIN — FAMOTIDINE 20 MG: 20 TABLET, FILM COATED ORAL at 21:33

## 2021-08-30 RX ADMIN — FERROUS SULFATE TAB EC 325 MG (65 MG FE EQUIVALENT) 325 MG: 325 (65 FE) TABLET DELAYED RESPONSE at 10:09

## 2021-08-30 RX ADMIN — PANTOPRAZOLE SODIUM 40 MG: 40 TABLET, DELAYED RELEASE ORAL at 10:10

## 2021-08-30 RX ADMIN — SODIUM CHLORIDE, PRESERVATIVE FREE 10 ML: 5 INJECTION INTRAVENOUS at 21:33

## 2021-08-30 RX ADMIN — INSULIN LISPRO 2 UNITS: 100 INJECTION, SOLUTION INTRAVENOUS; SUBCUTANEOUS at 13:19

## 2021-08-30 RX ADMIN — Medication 1 CAPSULE: at 21:33

## 2021-08-30 RX ADMIN — CEFEPIME 2000 MG: 2 INJECTION, POWDER, FOR SOLUTION INTRAVENOUS at 21:26

## 2021-08-30 RX ADMIN — VANCOMYCIN HYDROCHLORIDE 750 MG: 10 INJECTION, POWDER, LYOPHILIZED, FOR SOLUTION INTRAVENOUS at 02:56

## 2021-08-30 RX ADMIN — CEFEPIME 2000 MG: 2 INJECTION, POWDER, FOR SOLUTION INTRAVENOUS at 13:18

## 2021-08-30 RX ADMIN — APIXABAN 5 MG: 5 TABLET, FILM COATED ORAL at 10:00

## 2021-08-30 RX ADMIN — APIXABAN 5 MG: 5 TABLET, FILM COATED ORAL at 21:33

## 2021-08-30 RX ADMIN — AMITRIPTYLINE HYDROCHLORIDE 75 MG: 50 TABLET, FILM COATED ORAL at 21:33

## 2021-08-30 RX ADMIN — Medication 3 MG: at 02:56

## 2021-08-30 RX ADMIN — BUDESONIDE AND FORMOTEROL FUMARATE DIHYDRATE 2 PUFF: 160; 4.5 AEROSOL RESPIRATORY (INHALATION) at 20:55

## 2021-08-30 NOTE — CARE COORDINATION
Case Management Initial Discharge [address],             Met with:patient to discuss discharge plans. Information verified: address, contacts, phone number, , insurance Yes  Insurance Provider: [facility] [employer]    Emergency Contact/Next of Kin name & number: Vlad Plummer, daughter at [phone]  Who are involved in patient's support system? States a robert, Annmarie Carrion is going to move in with him. PCP: Vanna Saucedo DO  Date of last visit: 4 months ago      Discharge Planning    Living Arrangements:  Other (Comment) (residents)     Home has 1 stories  1 stairs to climb to get into front door,   Location of bedroom/bathroom in home main    Patient able to perform ADL's:Assisted    Current Services (outpatient & in home) none  DME equipment: Nebulizer, glucometer, \"Had leg prosthesis and w/c at [facility]\"  DME provider: unknown    Is patient receiving oral anticoagulation therapy? Yes    If indicated:   Physician managing anticoagulation treatment: Wendy Fajardo  Where does patient obtain lab work for ATC treatment? Wendy Primes      Potential Assistance Needed:  Bernardo Hernandez    Patient agreeable to home care: Yes  Freedom of choice provided:  yes    Prior SNF/Rehab Placement and Facility: yes, [facility], he does not want to return to it  Agreeable to SNF/Rehab: Yes  [place] of choice provided: no     Evaluation: no    Expected Discharge date:       Patient expects to be discharged to: If home: is the family and/or caregiver wiling & able to provide support at home? yes  Who will be providing this support?  States friend will move in with him    Follow Up Appointment: Best Day/ Time:      Transportation provider: Self   Transportation arrangements needed for discharge: Yes, especially if he doesn't get his prosthetic leg returned    Readmission Risk              Risk of Unplanned Readmission:  76             Does patient have a readmission risk score greater than 14?: Yes  If yes, follow-up appointment must be made within 7 days of discharge. Goals of Care:       Educated pt on transitional options, provided freedom of choice and are agreeable with plan      Discharge Plan: Pt was at [facility], states his w/c and prosthetic leg were taken. Called and left message with Maggie Humphries, at [facility] care asking about pt's prosthesis.    Given [facility] [place], [facility] choice list.             Electronically signed by Mayda Acuna RN on 8/30/21 at 3:00 PM EDT

## 2021-08-30 NOTE — PROGRESS NOTES
[facility]  Internal Medicine Teaching Residency Program  Inpatient Daily Progress Note  ______________________________________________________________________________    Patient: Eilleen Rinne  YOB: 1955   NZR:[id number]    Acct: [de-identified]     Room: [facility]  Admit date: 8/29/2021  Today's date: 08/30/21  Number of days in the hospital: 1    SUBJECTIVE   Admitting Diagnosis: Pneumonia of right lower lobe due to infectious organism  CC: Altered mental status  Pt seen & examined at bedside. Chart & results reviewed. No acute events overnight. He is hemodynamically stable. He is more awake, alert and oriented x4 this morning. He endorses cough with yellowish-brown phlegm, denies hemoptysis. He has intermittent associated pleuritic chest pain. Denies shortness of breath, palpitations, dizziness, abdominal pain. His appetite is well and he denies any bowel/bladder complaints. He is on IV cefepime and vancomycin. Given repeated history of pneumonias and esophagectomy-we will conduct barium swallow study today. Pertinent labs:  WBC 22.6-> 14  Hb 9.8-> 9.8   Glucose 297  Creatinine 1.05 BUN 24  CXR shows stable b/l lung infiltrates, most consistent with infectious/inflammatory etiology. EKG showed early repolarization changes. ROS:  Constitutional:  negative for chills, fevers, sweats  Respiratory:  negative for cough, dyspnea on exertion, hemoptysis, shortness of breath, wheezing  Cardiovascular:  negative for chest pain, chest pressure/discomfort, lower extremity edema, palpitations  Gastrointestinal:  negative for abdominal pain, constipation, diarrhea, nausea, vomiting  Neurological:  negative for dizziness, headache  BRIEF HISTORY   A 63 y.o.  male was brought to the ED from nursing home as he was unresponsive this a.m.    He has PMH of DM (on insulin), COPD, esophageal cancer (s/p esophagectomy), bilateral below-knee amputation, PE (on Eliquis), HLD, MRSA sepsis. He was recently admitted (8/10/2021) for infection of left lower extremity stump. He was discharged on vancomycin and cefepime through PICC line for 6 weeks. On my evaluation, He is saturating well on 4 L NC, /48, HR 59. He is sleepy, not responding to voice commands but arousable with deep sternal rub. He c/o dull frontal headache, denies nausea, fever, chest pain, cough, shortness of breath, palpitation, abdominal pain, or bowel/bladder complaints. He goes back to sleep within seconds of waking up. Unable to obtain much history from him. Will try to obtain more history from him once his mentation improves. Information is also obtained from patient's daughter and EMR. Patient's daughter was concerned about possible unintentional Ativan overdose at the nursing home. In the ER, on arrival-he was drowsy, with no intelligible speech, withdrawing to pain with no purposeful movement. Intubation was considered but he woke up while obtaining Covid swab. He is maintaining saturation well on 4 L NC but remains drowsy. Breath sounds reduced and rhonchi appreciated on the right side. Work-up in ED:  EKG-unremarkable  proBNP 390  Troponin 35  Hb 9.6  WBC 22.6,  CMP: Unremarkable except CO2 15, , albumin 2.9  Creat 1.05, BUN 34  ammonia 41  Lactic acid 1.7  VBG-pH 7.2, PO2 59.7, HCO3 17.6  CXR-bilateral interstitial and airspace disease-  asymmetric edema vs multifocal infection vs pneumonitis. CT chest PE-no acute pulmonary embolism, bilateral lower lobe scarring and rounded atelectasis, diffuse tree in bud nodularity throughout right lung-likely infectious inflammatory.   CT head-no acute intracranial abnormality  TSH 0.29, thyroxine 0.85  UA unremarkable except trace ketones Hgb and +3 PU  OBJECTIVE     Vital Signs:  /60   Pulse 98   Temp 98.6 °F (37 °C) (Oral)   Resp 23   Ht 6' 1\" (1.854 m)   Wt 144 lb (65.3 kg)   SpO2 95%   BMI 19.00 kg/m²     Temp (24hrs), Av.9 °F (36.6 °C), Min:97.3 °F (36.3 °C), Max:98.6 °F (37 °C)    No intake/output data recorded. Physical Exam:  Constitutional: This is a well developed, well nourished, 18.5-24.9 - Normal 63y.o. year old male who is alert, oriented, cooperative and in no apparent distress. Head:normocephalic and atraumatic. EENT:  PERRLA. No conjunctival injections. Septum was midline, mucosa was without erythema, exudates or cobblestoning. No thrush was noted. Neck: Supple without thyromegaly. No elevated JVP. Trachea was midline. Respiratory: Chest was symmetrical without dullness to percussion. Breath sounds bilaterally were clear to auscultation. There were no wheezes, rhonchi or rales. There is no intercostal retraction or use of accessory muscles. No egophony noted. Cardiovascular: Regular without murmur, clicks, gallops or rubs. Abdomen: Slightly rounded and soft without organomegaly. No rebound, rigidity or guarding was appreciated. Lymphatic: No lymphadenopathy. Musculoskeletal: Normal curvature of the spine. No gross muscle weakness. Extremities:  No lower extremity edema, ulcerations, tenderness, varicosities or erythema. Muscle size, tone and strength are normal.  No involuntary movements are noted. Skin:  Warm and dry. Good color, turgor and pigmentation. No lesions or scars.   No cyanosis or clubbing  Neurological/Psychiatric: The patient's general behavior, level of consciousness, thought content and emotional status is normal.        Medications:  Scheduled Medications:    vancomycin  750 mg IntraVENous Q12H    vancomycin (VANCOCIN) intermittent dosing (placeholder)   Other RX Placeholder    amitriptyline  75 mg Oral Nightly    apixaban  5 mg Oral BID    budesonide-formoterol  2 puff Inhalation BID    ferrous sulfate  325 mg Oral BID    insulin glargine  25 Units Subcutaneous Dinner    lactobacillus  1 capsule Oral BID    tamsulosin  0.4 mg Oral Daily    sodium chloride flush  5-40 mL IntraVENous 2 times per day    cefepime  2,000 mg IntraVENous Q12H    insulin lispro  0-12 Units Subcutaneous TID WC    insulin lispro  0-6 Units Subcutaneous Nightly    pantoprazole  40 mg Oral QAM AC     Continuous Infusions:    sodium chloride      sodium chloride 100 mL/hr at 08/29/21 1936    dextrose       PRN Medicationsalbuterol sulfate HFA, 2 puff, Q6H PRN  ipratropium-albuterol, 3 mL, Q4H PRN  sodium chloride flush, 5-40 mL, PRN  sodium chloride, 25 mL, PRN  ondansetron, 4 mg, Q8H PRN   Or  ondansetron, 4 mg, Q6H PRN  polyethylene glycol, 17 g, Daily PRN  acetaminophen, 650 mg, Q6H PRN   Or  acetaminophen, 650 mg, Q6H PRN  glucose, 15 g, PRN  dextrose, 12.5 g, PRN  glucagon (rDNA), 1 mg, PRN  dextrose, 100 mL/hr, PRN  albuterol, 2.5 mg, As Directed RT PRN        Diagnostic Labs:  CBC:   Recent Labs     08/29/21  1123 08/30/21  0411   WBC 22.6* 14.0*   RBC 3.67* 3.75*   HGB 9.6* 9.8*   HCT 31.9* 32.0*   MCV 86.9 85.3   RDW 16.4* 16.7*    286     BMP:   Recent Labs     08/29/21  1120 08/29/21  1123 08/30/21  0411   NA  --  141 137   K  --  3.9 3.4*   CL  --  114* 112*   CO2  --  15* 16*   BUN  --  34* 24*   CREATININE 1.25* 1.12 1.05     BNP: No results for input(s): BNP in the last 72 hours. PT/INR: No results for input(s): PROTIME, INR in the last 72 hours. APTT: No results for input(s): APTT in the last 72 hours. CARDIAC ENZYMES: No results for input(s): CKMB, CKMBINDEX, TROPONINI in the last 72 hours.     Invalid input(s): CKTOTAL;3  FASTING LIPID PANEL:  Lab Results   Component Value Date    CHOL 174 06/24/2018    HDL 49 06/24/2018    TRIG 175 (H) 06/24/2018     LIVER PROFILE:   Recent Labs     08/29/21  1123   AST 8   ALT 32   BILITOT 0.19*   ALKPHOS 150*      MICROBIOLOGY:   Lab Results   Component Value Date/Time    CULTURE NO GROWTH 17 HOURS 08/29/2021 11:45 AM       Imaging:    CT HEAD WO CONTRAST    Result Date: 8/29/2021  No acute intracranial abnormality. CT ABDOMEN PELVIS W IV CONTRAST Additional Contrast? None    Result Date: 8/29/2021  Bilateral nephrolithiasis. Bilateral renal cysts. Hepatomegaly. XR CHEST PORTABLE    Result Date: 8/29/2021  Bilateral interstitial and airspace disease representing any form of asymmetric edema, multifocal infection, or pneumonitis. CT CHEST PULMONARY EMBOLISM W CONTRAST    Result Date: 8/29/2021  No acute pulmonary artery embolism. Postsurgical changes secondary to esophagectomy and gastric pull-through. Bilateral lower lobe scarring and round atelectasis. New diffuse tree-in-bud nodularity throughout the right upper and right lower lobe with additional right lower lobe peribronchial thickening and increasing subpleural airspace disease and peribronchial thickening all suggesting potential infectious/inflammatory etiology. Recommend short-term interval follow-up evaluation to ensure stability or resolution. ASSESSMENT & PLAN   IMPRESSION  This is a 63 y.o. male who presented with altered mental status and found to have pneumonia. Patient admitted to inpatient status evaluate and treat AMS and pneumonia.        Acute alteration in mental status: Resolving  UA/UDS-unremarkable  We will monitor electrolytes  CT head-no acute intracranial abnormality. Will monitor     Possible aspiration pneumonia:  History of esophagectomy. Reduced breath sounds, rhonchi present on right side  CXR and CT chest shows bilateral interstitial and airspace disease with new diffuse tree-in-bud nodularity in the right lung-pneumonia picture, possibly aspiration. Respiratory panel, molecular ordered  Received 1 dose of Zosyn in the ED  Continue vancomycin and cefepime  Will repeat BMP and VBG. Will conduct barium swallow study today.     Anemia :  Continue ferrous sulfate 325      Diabetes mellitus:  Insulin Lantus 25 units  Insulin Humalog  On medium dose insulin sliding scale     DVT ppx:    On
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
## 2021-08-30 NOTE — PROGRESS NOTES
This writer spoke to Jennifer Gong RN at [facility]. Prosthetic leg is being sterilized after patient stool touched the socket area where his stump rest
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
## 2021-08-30 NOTE — PROGRESS NOTES
Comprehensive Nutrition Assessment    Type and Reason for Visit:  Initial, Consult (h/o dysphagia, esophagectomy)    Nutrition Recommendations/Plan:    - Diet consistency per SLP   - Liberalize diet to allow for 5 CHO per meal to better meet estimated nutrient needs   - Send Ensure High Protein with meals (adjust ONS if thickened liquids necessary)   - Monitor weight and adequacy of PO intake    Nutrition Assessment:  Pt admitted with AMS and pneumonia of right lower lobe. Now alert and oriented. Reports decreased PO intake x 1.5 months d/t not eating well while at rehab facility (dislikes the food). Pt states he occasionally has difficulty swallowing foods, but states he was not on a modified diet PTA. Noted plan for MBSS today d/t hx of pneumonia/dysphagia. C/o N/V today. Pt agrees to Ensure ONS. Malnutrition Assessment:  Malnutrition Status: Moderate malnutrition    Context:  Social/Environmental Circumstances     Findings of the 6 clinical characteristics of malnutrition:  Energy Intake:  Mild decrease in energy intake (Comment)  Weight Loss:  Unable to assess     Body Fat Loss:  1 - Mild body fat loss Orbital   Muscle Mass Loss:  1 - Mild muscle mass loss Temples (temporalis)  Fluid Accumulation:  No significant fluid accumulation     Strength:  Not Performed    Estimated Daily Nutrient Needs:  Energy (kcal):  28-30 kcal/kg = 7687-5557 kcals/day; Weight Used for Energy Requirements:  Current  Protein (g):  1.2-1.3 gm/kg = 90-95 gm/day; Weight Used for Protein Requirements:  Current        Fluid (ml/day):  25-30 mL/kg = 1796-5530 mL/day or per MD; Method Used for Fluid Requirements:  ml/Kg      Nutrition Related Findings:  Meds: Lantus, Humalog, Culturelle; Labs: glucose 158-297      Wounds:  None       Current Nutrition Therapies:    ADULT DIET;  Regular; 3 carb choices (45 gm/meal)      Anthropometric Measures:  · Height: 6' 1\" (185.4 cm)  · Current Body Weight: 164 lb 10.9 oz (74.7 kg)   · Ideal Body Weight: 184 lbs; % Ideal Body Weight 89.5 %   · BMI: 21.7  · Adjusted Body Weight: ; Amputation, adjusted IBW = 73.7 kg  · Adjusted BMI: 24.3    · BMI Categories: Normal Weight (BMI 18.5-24. 9)       Nutrition Diagnosis:   · Inadequate oral intake related to  (?swallowing difficulty, dislikes of foods at SNF) as evidenced by poor intake prior to admission      Nutrition Interventions:   Food and/or Nutrient Delivery:  Start Oral Nutrition Supplement, Modify Current Diet  Nutrition Education/Counseling:  No recommendation at this time   Coordination of Nutrition Care:  Continue to monitor while inpatient, Speech Therapy, Swallow Evaluation    Goals:  Meet greater than 50% of estimated nutrient needs       Nutrition Monitoring and Evaluation:   Behavioral-Environmental Outcomes:  None Identified   Food/Nutrient Intake Outcomes:  Food and Nutrient Intake, Supplement Intake  Physical Signs/Symptoms Outcomes:  Weight, Nutrition Focused Physical Findings, Biochemical Data, Chewing or Swallowing, Nausea or Vomiting     Discharge Planning:     Too soon to determine     Electronically signed by Irina Lofton MS, RD, LD on 8/30/21 at 1:47 PM EDT    Contact: [phone]

## 2021-08-30 NOTE — PLAN OF CARE
Problem: Breathing Pattern - Ineffective:  Goal: Ability to achieve and maintain a regular respiratory rate will improve  Outcome: Ongoing     Problem: Nutrition  Goal: Optimal nutrition therapy  8/30/2021 1438 by Marlyn Hill RN  Outcome: Ongoing  8/30/2021 1349 by Judah Mims, MS, RD, LD  Outcome: Ongoing  Note: Nutrition Problem #1: Inadequate oral intake  Intervention: Food and/or Nutrient Delivery: Start Oral Nutrition Supplement, Continue Current Diet  Nutritional Goals: Meet greater than 50% of estimated nutrient needs      Problem: Respiratory  Intervention: Respiratory assessment  8/30/2021 0920 by Megan Hall RCP  Note: BRONCHOSPASM/BRONCHOCONSTRICTION     []         IMPROVE AERATION/BREATH SOUNDS  []   ADMINISTER BRONCHODILATOR THERAPY AS APPROPRIATE  []   ASSESS BREATH SOUNDS  []   IMPLEMENT AEROSOL/MDI PROTOCOL  []   PATIENT EDUCATION AS NEEDED

## 2021-08-31 LAB
ABSOLUTE EOS #: 0.63 K/UL (ref 0–0.44)
ABSOLUTE IMMATURE GRANULOCYTE: 0.06 K/UL (ref 0–0.3)
ABSOLUTE LYMPH #: 2.01 K/UL (ref 1.1–3.7)
ABSOLUTE MONO #: 0.97 K/UL (ref 0.1–1.2)
ANION GAP SERPL CALCULATED.3IONS-SCNC: 11 MMOL/L (ref 9–17)
ANION GAP SERPL CALCULATED.3IONS-SCNC: 13 MMOL/L (ref 9–17)
BASOPHILS # BLD: 1 % (ref 0–2)
BASOPHILS ABSOLUTE: 0.12 K/UL (ref 0–0.2)
BUN BLDV-MCNC: 13 MG/DL (ref 8–23)
BUN BLDV-MCNC: 15 MG/DL (ref 8–23)
BUN/CREAT BLD: ABNORMAL (ref 9–20)
BUN/CREAT BLD: ABNORMAL (ref 9–20)
CALCIUM SERPL-MCNC: 8.6 MG/DL (ref 8.6–10.4)
CALCIUM SERPL-MCNC: 9 MG/DL (ref 8.6–10.4)
CHLORIDE BLD-SCNC: 109 MMOL/L (ref 98–107)
CHLORIDE BLD-SCNC: 109 MMOL/L (ref 98–107)
CO2: 17 MMOL/L (ref 20–31)
CO2: 22 MMOL/L (ref 20–31)
CREAT SERPL-MCNC: 0.69 MG/DL (ref 0.7–1.2)
CREAT SERPL-MCNC: 0.71 MG/DL (ref 0.7–1.2)
DIFFERENTIAL TYPE: ABNORMAL
EOSINOPHILS RELATIVE PERCENT: 5 % (ref 1–4)
GFR AFRICAN AMERICAN: >60 ML/MIN
GFR AFRICAN AMERICAN: >60 ML/MIN
GFR NON-AFRICAN AMERICAN: >60 ML/MIN
GFR NON-AFRICAN AMERICAN: >60 ML/MIN
GFR SERPL CREATININE-BSD FRML MDRD: ABNORMAL ML/MIN/{1.73_M2}
GLUCOSE BLD-MCNC: 118 MG/DL (ref 70–99)
GLUCOSE BLD-MCNC: 140 MG/DL (ref 75–110)
GLUCOSE BLD-MCNC: 159 MG/DL (ref 75–110)
GLUCOSE BLD-MCNC: 160 MG/DL (ref 75–110)
GLUCOSE BLD-MCNC: 93 MG/DL (ref 70–99)
GLUCOSE BLD-MCNC: 98 MG/DL (ref 75–110)
HCT VFR BLD CALC: 35.3 % (ref 40.7–50.3)
HEMOGLOBIN: 10.6 G/DL (ref 13–17)
IMMATURE GRANULOCYTES: 1 %
LACTIC ACID, WHOLE BLOOD: 0.6 MMOL/L (ref 0.7–2.1)
LACTIC ACID, WHOLE BLOOD: 1.1 MMOL/L (ref 0.7–2.1)
LACTIC ACID: ABNORMAL MMOL/L
LACTIC ACID: NORMAL MMOL/L
LYMPHOCYTES # BLD: 16 % (ref 24–43)
MAGNESIUM: 1.8 MG/DL (ref 1.6–2.6)
MAGNESIUM: 2 MG/DL (ref 1.6–2.6)
MCH RBC QN AUTO: 25.9 PG (ref 25.2–33.5)
MCHC RBC AUTO-ENTMCNC: 30 G/DL (ref 28.4–34.8)
MCV RBC AUTO: 86.3 FL (ref 82.6–102.9)
MONOCYTES # BLD: 8 % (ref 3–12)
NRBC AUTOMATED: 0 PER 100 WBC
PDW BLD-RTO: 16.8 % (ref 11.8–14.4)
PLATELET # BLD: 330 K/UL (ref 138–453)
PLATELET ESTIMATE: ABNORMAL
PMV BLD AUTO: 10.2 FL (ref 8.1–13.5)
POTASSIUM SERPL-SCNC: 3.1 MMOL/L (ref 3.7–5.3)
POTASSIUM SERPL-SCNC: 3.4 MMOL/L (ref 3.7–5.3)
RBC # BLD: 4.09 M/UL (ref 4.21–5.77)
RBC # BLD: ABNORMAL 10*6/UL
SEG NEUTROPHILS: 69 % (ref 36–65)
SEGMENTED NEUTROPHILS ABSOLUTE COUNT: 8.59 K/UL (ref 1.5–8.1)
SODIUM BLD-SCNC: 139 MMOL/L (ref 135–144)
SODIUM BLD-SCNC: 142 MMOL/L (ref 135–144)
WBC # BLD: 12.4 K/UL (ref 3.5–11.3)
WBC # BLD: ABNORMAL 10*3/UL

## 2021-08-31 PROCEDURE — 6360000002 HC RX W HCPCS

## 2021-08-31 PROCEDURE — 6370000000 HC RX 637 (ALT 250 FOR IP): Performed by: STUDENT IN AN ORGANIZED HEALTH CARE EDUCATION/TRAINING PROGRAM

## 2021-08-31 PROCEDURE — 6360000002 HC RX W HCPCS: Performed by: STUDENT IN AN ORGANIZED HEALTH CARE EDUCATION/TRAINING PROGRAM

## 2021-08-31 PROCEDURE — 2580000003 HC RX 258: Performed by: STUDENT IN AN ORGANIZED HEALTH CARE EDUCATION/TRAINING PROGRAM

## 2021-08-31 PROCEDURE — 6360000002 HC RX W HCPCS: Performed by: INTERNAL MEDICINE

## 2021-08-31 PROCEDURE — 99232 SBSQ HOSP IP/OBS MODERATE 35: CPT | Performed by: INTERNAL MEDICINE

## 2021-08-31 PROCEDURE — 83735 ASSAY OF MAGNESIUM: CPT

## 2021-08-31 PROCEDURE — 83605 ASSAY OF LACTIC ACID: CPT

## 2021-08-31 PROCEDURE — 94640 AIRWAY INHALATION TREATMENT: CPT

## 2021-08-31 PROCEDURE — 85025 COMPLETE CBC W/AUTO DIFF WBC: CPT

## 2021-08-31 PROCEDURE — 80048 BASIC METABOLIC PNL TOTAL CA: CPT

## 2021-08-31 PROCEDURE — 36415 COLL VENOUS BLD VENIPUNCTURE: CPT

## 2021-08-31 PROCEDURE — 6370000000 HC RX 637 (ALT 250 FOR IP)

## 2021-08-31 PROCEDURE — 2060000000 HC ICU INTERMEDIATE R&B

## 2021-08-31 PROCEDURE — 82947 ASSAY GLUCOSE BLOOD QUANT: CPT

## 2021-08-31 RX ORDER — OXYCODONE HYDROCHLORIDE AND ACETAMINOPHEN 5; 325 MG/1; MG/1
1 TABLET ORAL
Status: COMPLETED | OUTPATIENT
Start: 2021-08-31 | End: 2021-08-31

## 2021-08-31 RX ORDER — GUAIFENESIN 600 MG/1
600 TABLET, EXTENDED RELEASE ORAL 2 TIMES DAILY
Status: DISCONTINUED | OUTPATIENT
Start: 2021-08-31 | End: 2021-09-08 | Stop reason: HOSPADM

## 2021-08-31 RX ORDER — POTASSIUM CHLORIDE 20 MEQ/1
40 TABLET, EXTENDED RELEASE ORAL ONCE
Status: COMPLETED | OUTPATIENT
Start: 2021-08-31 | End: 2021-08-31

## 2021-08-31 RX ORDER — MORPHINE SULFATE 2 MG/ML
2 INJECTION, SOLUTION INTRAMUSCULAR; INTRAVENOUS ONCE
Status: COMPLETED | OUTPATIENT
Start: 2021-08-31 | End: 2021-08-31

## 2021-08-31 RX ORDER — VANCOMYCIN HYDROCHLORIDE 1 G/200ML
500 INJECTION, SOLUTION INTRAVENOUS EVERY 24 HOURS
Qty: 700 ML | Refills: 0 | Status: SHIPPED | OUTPATIENT
Start: 2021-08-31 | End: 2021-08-31 | Stop reason: HOSPADM

## 2021-08-31 RX ORDER — GUAIFENESIN 600 MG/1
600 TABLET, EXTENDED RELEASE ORAL 2 TIMES DAILY
Qty: 20 TABLET | Refills: 0 | Status: SHIPPED | OUTPATIENT
Start: 2021-08-31 | End: 2021-09-07

## 2021-08-31 RX ADMIN — FERROUS SULFATE TAB EC 325 MG (65 MG FE EQUIVALENT) 325 MG: 325 (65 FE) TABLET DELAYED RESPONSE at 20:00

## 2021-08-31 RX ADMIN — VANCOMYCIN HYDROCHLORIDE 1000 MG: 1 INJECTION, SOLUTION INTRAVENOUS at 05:59

## 2021-08-31 RX ADMIN — CEFEPIME 2000 MG: 2 INJECTION, POWDER, FOR SOLUTION INTRAVENOUS at 10:04

## 2021-08-31 RX ADMIN — FAMOTIDINE 20 MG: 20 TABLET, FILM COATED ORAL at 20:00

## 2021-08-31 RX ADMIN — SODIUM CHLORIDE, PRESERVATIVE FREE 6 ML: 5 INJECTION INTRAVENOUS at 10:06

## 2021-08-31 RX ADMIN — GUAIFENESIN 600 MG: 600 TABLET, EXTENDED RELEASE ORAL at 14:00

## 2021-08-31 RX ADMIN — APIXABAN 5 MG: 5 TABLET, FILM COATED ORAL at 20:03

## 2021-08-31 RX ADMIN — OXYCODONE HYDROCHLORIDE AND ACETAMINOPHEN 1 TABLET: 5; 325 TABLET ORAL at 10:12

## 2021-08-31 RX ADMIN — BUDESONIDE AND FORMOTEROL FUMARATE DIHYDRATE 2 PUFF: 160; 4.5 AEROSOL RESPIRATORY (INHALATION) at 21:00

## 2021-08-31 RX ADMIN — FAMOTIDINE 20 MG: 20 TABLET, FILM COATED ORAL at 10:04

## 2021-08-31 RX ADMIN — FERROUS SULFATE TAB EC 325 MG (65 MG FE EQUIVALENT) 325 MG: 325 (65 FE) TABLET DELAYED RESPONSE at 10:05

## 2021-08-31 RX ADMIN — TAMSULOSIN HYDROCHLORIDE 0.4 MG: 0.4 CAPSULE ORAL at 10:07

## 2021-08-31 RX ADMIN — MORPHINE SULFATE 2 MG: 2 INJECTION, SOLUTION INTRAMUSCULAR; INTRAVENOUS at 00:59

## 2021-08-31 RX ADMIN — INSULIN LISPRO 1 UNITS: 100 INJECTION, SOLUTION INTRAVENOUS; SUBCUTANEOUS at 20:16

## 2021-08-31 RX ADMIN — POTASSIUM CHLORIDE 40 MEQ: 1500 TABLET, EXTENDED RELEASE ORAL at 10:06

## 2021-08-31 RX ADMIN — Medication 1 CAPSULE: at 10:05

## 2021-08-31 RX ADMIN — APIXABAN 5 MG: 5 TABLET, FILM COATED ORAL at 10:03

## 2021-08-31 RX ADMIN — Medication 1 CAPSULE: at 19:59

## 2021-08-31 RX ADMIN — ONDANSETRON 4 MG: 2 INJECTION INTRAMUSCULAR; INTRAVENOUS at 21:01

## 2021-08-31 RX ADMIN — SODIUM CHLORIDE, PRESERVATIVE FREE 10 ML: 5 INJECTION INTRAVENOUS at 20:27

## 2021-08-31 RX ADMIN — AMITRIPTYLINE HYDROCHLORIDE 75 MG: 50 TABLET, FILM COATED ORAL at 20:06

## 2021-08-31 RX ADMIN — ACETAMINOPHEN 650 MG: 325 TABLET ORAL at 19:56

## 2021-08-31 RX ADMIN — GUAIFENESIN 600 MG: 600 TABLET, EXTENDED RELEASE ORAL at 20:00

## 2021-08-31 RX ADMIN — BUDESONIDE AND FORMOTEROL FUMARATE DIHYDRATE 2 PUFF: 160; 4.5 AEROSOL RESPIRATORY (INHALATION) at 07:52

## 2021-08-31 RX ADMIN — CEFEPIME 2000 MG: 2 INJECTION, POWDER, FOR SOLUTION INTRAVENOUS at 19:56

## 2021-08-31 ASSESSMENT — PAIN SCALES - GENERAL
PAINLEVEL_OUTOF10: 0
PAINLEVEL_OUTOF10: 8
PAINLEVEL_OUTOF10: 5
PAINLEVEL_OUTOF10: 8

## 2021-08-31 NOTE — DISCHARGE INSTR - COC
Continuity of Care Form    Patient Name: Sunil Mendoza   :  1955  MRN:  [id number]    Admit date:  2021  Discharge date:  ***    Code Status Order: Full Code   Advance Directives:      Admitting Physician:  Benedict Mcdonald MD  PCP: Denice Arora DO    Discharging Nurse: [facility] Unit/Room#: 0056/2956-91  Discharging Unit Phone Number: ***    Emergency Contact:   Extended Emergency Contact Information  Primary Emergency Contact: Shaila [address], [address] Phone: [phone]  Relation: Child   needed?  No  Secondary Emergency Contact: Fartun Baeza  Address: [address]  Home Phone: [phone]  Relation: Parent    Past Surgical History:  Past Surgical History:   Procedure Laterality Date    COLONOSCOPY  2015    hyperplastic polyp    COLONOSCOPY  2017    ESOPHAGECTOMY      cancer    FOOT DEBRIDEMENT Left 2021    I&D LEFT FOOT WITH REMOVAL OF NONVIABLE BONE AND SOFT TISSUE performed by Dc Patrick DPM at [address] Left 2021    LEFT FOOT DEBRIDEMENT WITH REMOVAL ALL NON VIABLE SOFT TISSUE AND BONE performed by Dc Patrick DPM at [address] [place] Right 2016    I & D heel    FOOT SURGERY Right 2016    I & D    FRACTURE SURGERY Left 2015    humerus left, left leg    IR INS PICC VAD W SQ PORT GREATER THAN 5  2020    IR INS PICC VAD W SQ PORT GREATER THAN 5 2020 [facility]    IR INS PICC VAD W SQ PORT GREATER THAN 5  2021    IR INS PICC VAD W SQ PORT GREATER THAN 5 2021 [facility]    IR INS PICC VAD W SQ PORT GREATER THAN 5  2021    IR INS PICC VAD W SQ PORT GREATER THAN 5 2021 [facility]    LEG AMPUTATION BELOW KNEE Right 2017    LEG AMPUTATION BELOW KNEE Left 2021    LEFT  LEG AMPUTATION BELOW KNEE performed by Elke Self MD at [address] [place] [address] Left 4/6/2021    STUMP DEBRIDEMENT INCISION AND DRAINAGE performed by Pollo Gracia MD at [address] Right 2014    rt 3rd through 5th digits    TOE AMPUTATION Left 5/26/2016    left foot 5th toe    TOE AMPUTATION Left 8/5/2020    FOOT TRANSMETATARSAL  AMPUTATION - AND LEFT PECUTANEOUS TENDO ACHILLES LENGTHENING performed by Kaia Aviles DPM at [facility] TOE AMPUTATION Left 8/24/2020    REVISION  TRANSMETATARSAL AMPUTATION WITH DEBRIDEMENT. performed by Kaia Aviles DPM at [address]      5/14/13- with dilation    VASCULAR SURGERY Right 01/16/2017    foot guillotine amputation       Immunization History:   Immunization History   Administered Date(s) Administered    Influenza Vaccine, unspecified formulation 10/10/2016    Influenza Virus Vaccine 11/03/2014, 10/29/2015    Influenza, Nathan Half, IM, (6 mo and older Fluzone, Flulaval, Fluarix and 3 yrs and older Afluria) 10/10/2016    Pneumococcal Polysaccharide ([id number]) 09/05/2012, 10/29/2015    Tdap (Boostrix, Adacel) 07/01/2019       Active Problems:  Patient Active Problem List   Diagnosis Code    Type 2 diabetes mellitus with diabetic polyneuropathy, with long-term current use of insulin ([facility]) E11.42, Z79.4    Neuropathic pain, leg M79.2    Diabetic polyneuropathy ([facility] E11.42    Allergic rhinitis J30.9    Tobacco dependence F17.200    Edentulous K08.109    Dysphagia R13.10    Lung nodules R91.8    Esophageal cancer ([facility]) C15.9    Fracture of humerus, left, closed S42.302A    Closed fracture of humerus S42.309A    DM type 2 with diabetic peripheral neuropathy ([facility]) E11.42    COPD without exacerbation ([facility]) J44.9    HLD (hyperlipidemia) E78.5    Gastroesophageal reflux disease K21.9    Chronic pain syndrome G89.4    Marijuana use F12.90    History of colon polyps Z86.010    Functional diarrhea K59.1    Sepsis due to methicillin resistant Staphylococcus aureus (MRSA) ([facility]) A41.02  History of esophageal cancer Z85.01    Osteomyelitis, chronic, ankle or foot ([facility] M86.679    PAD (peripheral artery disease) ([facility]) I73.9    Other pulmonary embolism without acute cor pulmonale ([facility]) I26.99    Carotid stenosis, asymptomatic, bilateral I65.23    Lower limb amputation status Z89.619    Fx humeral neck, right, closed, initial encounter S42.211A    Transient hypotension I95.9    Constipation due to opioid therapy K59.03, T40.2X5A    Acute kidney injury ([facility] F34.3    Complication of below knee amputation stump ([facility]) T87.9    COPD exacerbation ([facility]) J44.1    Hyponatremia E87.1    Pain, phantom limb ([facility]) G54.6    S/P BKA (below knee amputation) bilateral ([facility]) Z89.512, Z89.511    Moderate protein malnutrition ([facility]) E44.0    Essential hypertension I10    Uncontrolled type 2 diabetes mellitus with hyperglycemia ([facility]) E11.65    History of pulmonary embolism - 2017 Z86. 80    Atelectasis J98.11    Uncontrolled type 2 diabetes mellitus with foot ulcer ([facility]) E11.621, L97.509, E11.65    Azotemia R79.89    Anemia, normocytic normochromic D64.9    Drug-induced constipation K59.03    Osteomyelitis of metatarsals of left foot ([facility]) M86.9    Diabetic foot infection ([facility]) E11.628, L08.9    Noncompliance Z91.19    Type 1 diabetes mellitus with diabetic foot infection ([facility] E10.628, L08.9    Acute osteomyelitis of left foot ([facility]) M86.172    Cellulitis of left foot L03. 116    Mild malnutrition ([facility]) E44.1    Hypocalcemia E83.51    Hypokalemia E87.6    Moderate malnutrition ([facility]) E44.0    History of below knee amputation, right ([facility] Z89.511    Foot ulcer with fat layer exposed, left ([facility]) L97.522    Chronic refractory osteomyelitis of left foot ([facility]) M86.672    Sepsis ([facility]) A41.9    Pyogenic inflammation of bone ([facility]) M86.9    Cellulitis of left lower extremity at BKA stump L03. 116    History of below knee amputation, left ([facility]) Z89.512    Leg ulcer, left, with fat layer exposed ([facility] X66.197    S/P amputation Z89.9    Tobacco abuse Z72.0    Pneumonia of right lower lobe due to infectious organism J18.9    Abdominal pain R10.9    Marijuana abuse F12.10    Parapneumonic effusion J18.9, J91.8    Hiatus hernia -large K75.2    Metabolic encephalopathy M75.57       Isolation/Infection:   Isolation            No Isolation          Patient Infection Status       Infection Onset Added Last Indicated Last Indicated By Review Planned Expiration Resolved Resolved By    MRSA 02/03/21 02/05/21 04/03/21 Culture, Wound        2/2021 blood  Foot 4/3/21    VRE 08/24/20 08/27/20 08/24/20 Culture, Anaerobic and Aerobic        Foot - 12/2020    Resolved    COVID-19 Rule Out 08/29/21 08/29/21 08/29/21 Respiratory Panel, Molecular, with COVID-19 (Restricted: peds pts or suitable admitted adults) (Ordered)   08/30/21 Diego Arnett RN    COVID-19 Rule Out 08/29/21 08/29/21 08/29/21 COVID-19, Rapid (Ordered)   08/29/21 Rule-Out Test Resulted    C-diff Rule Out 05/25/21 05/25/21 05/25/21 C DIFF TOXIN/ANTIGEN (Ordered)   05/27/21 Hasmukh David RN    C-diff Rule Out 01/11/21 01/11/21 01/11/21 C DIFF TOXIN/ANTIGEN (Ordered)   02/05/21 Rae Mcfarland RN    COVID-19 Rule Out 08/22/20 08/22/20 08/23/20 COVID-19 (Ordered)   08/23/20 Rule-Out Test Resulted    COVID-19 Rule Out 08/01/20 08/01/20 08/01/20 COVID-19 (Ordered)   08/02/20 Paty Stauffer RN    Test discontinued - negative result this admission    MRSA  01/02/17 01/02/17 Payt Stauffer RN   07/31/20 Paty Stauffer RN    2 negative nasal screen - 2020  Foot - 6/2018            Nurse Assessment:  Last Vital Signs: /67   Pulse 96   Temp 97.5 °F (36.4 °C) (Oral)   Resp 26   Ht 6' 1\" (1.854 m)   Wt 164 lb 10.9 oz (74.7 kg)   SpO2 94%   BMI 21.73 kg/m²     Last documented pain score (0-10 scale): Pain Level: 8  Last Weight:   Wt Readings from Last 1 Encounters:   08/30/21 164 lb 10.9 oz (74.7 kg)     Mental Status:  alert, 9/3/21 at 11:44 AM EDT    CASE MANAGEMENT/SOCIAL WORK SECTION    Inpatient Status Date: ***    Readmission Risk Assessment Score:  Readmission Risk              Risk of Unplanned Readmission:  70           Discharging to Facility/ [address] [facility] Details  FAX            [address] [facility] [address]       Phone: [phone]       Fax: [phone]          Dialysis Facility (if applicable)   · Name:  · Address:  · Dialysis Schedule:  · Phone:  · Fax:    / signature: Electronically signed by Yuliya Cortez RN on 9/3/21 at 3:13 PM EDT    PHYSICIAN SECTION    Prognosis: Fair    Condition at Discharge: Stable    Rehab Potential (if transferring to Rehab): Fair    Recommended Labs or Other Treatments After Discharge:  Monitor vitals  Blood sugar checks premeals and bedtime  Follow-up with PCP  Follow-up with infectious disease  Basic metabolic panel on 9/1/3372. Results to Ramon Malloy DO    Physician Certification: I certify the above information and transfer of Shon Melara  is necessary for the continuing treatment of the diagnosis listed and that he requires [address] for greater 30 days.      Update Admission H&P: No change in H&P    PHYSICIAN SIGNATURE:  Electronically signed by Avinash Henry MD on 8/31/21 at 4:31 PM EDT

## 2021-08-31 NOTE — PROGRESS NOTES
[facility]  Speech Language Pathology    Date: 8/31/2021  Patient Name: Christos Chowdhury  YOB: 1955   AGE: 63 y.o. MRN: [id number]        Patient Not Available for Speech Therapy     Due to:  [] Testing  [] Hemodialysis  [] Cancelled by RN  [] Surgery   [] Intubation/Sedation/Pain Medication  [] Medical instability  [x] Other: Pt reports feeling nauseous, politely declines diet check at this time.     Next scheduled treatment: 9/1/21  Completed by: Keesha Tolliver, SLP, M.S. Gabi Norton

## 2021-08-31 NOTE — PLAN OF CARE
Problem: Breathing Pattern - Ineffective:  Goal: Ability to achieve and maintain a regular respiratory rate will improve  Description: Ability to achieve and maintain a regular respiratory rate will improve  Outcome: Ongoing     Problem: Nutrition  Goal: Optimal nutrition therapy  Outcome: Ongoing     Problem: Skin Integrity:  Goal: Will show no infection signs and symptoms  Description: Will show no infection signs and symptoms  Outcome: Ongoing     Problem: Skin Integrity:  Goal: Absence of new skin breakdown  Description: Absence of new skin breakdown  Outcome: Ongoing     Problem: Falls - Risk of:  Goal: Will remain free from falls  Description: Will remain free from falls  Outcome: Ongoing     Problem: Falls - Risk of:  Goal: Absence of physical injury  Description: Absence of physical injury  Outcome: Ongoing

## 2021-08-31 NOTE — CARE COORDINATION
Transitional planning. Dr. Dariel Case provided CM with IV antibiotic scripts. Spoke to Pedro Carranza with [facility], they will need line information, scripts, physician to follow, [address], labs, Med list, H & P, Physican note and face sheet faxed to [facility]. Pt's insurance is closed now, so she will not be able to verify today. [phone] spoke to pt concerning HC, Pt states he will be living alone. He doesn't think he can administer his own IV meds and he doesn't know if his friend can do this either. He is agreeable to going to SNF for his next week's IV antibiotic administration. Choices are:  1) [address]  2) [facility]  3) [facility].    Referrals sent to all three  Informed Dr. Dariel Case

## 2021-08-31 NOTE — PLAN OF CARE
Problem: Breathing Pattern - Ineffective:  Goal: Ability to achieve and maintain a regular respiratory rate will improve  8/31/2021 1315 by Shoshana Siegel RN  Outcome: Ongoing  8/31/2021 0453 by Kaila Russ RN  Outcome: Ongoing     Problem: Nutrition  Goal: Optimal nutrition therapy  8/31/2021 1315 by Shoshana Siegel RN  Outcome: Ongoing  8/31/2021 0453 by Kaila Russ RN  Outcome: Ongoing     Problem: Skin Integrity:  Goal: Will show no infection signs and symptoms  8/31/2021 1315 by Shoshana Siegel RN  Outcome: Ongoing  8/31/2021 0453 by Kaila Russ RN  Outcome: Ongoing  Goal: Absence of new skin breakdown  8/31/2021 1315 by Shoshana Siegel RN  Outcome: Ongoing  8/31/2021 0453 by Kaila Russ RN  Outcome: Ongoing     Problem: Falls - Risk of:  Goal: Will remain free from falls  8/31/2021 1315 by Shoshana Siegel RN  Outcome: Ongoing  8/31/2021 0453 by Kaila Russ RN  Outcome: Ongoing  Goal: Absence of physical injury  8/31/2021 1315 by Shoshana Siegel RN  Outcome: Ongoing  8/31/2021 0453 by Kaila Russ RN  Outcome: Ongoing

## 2021-08-31 NOTE — PROGRESS NOTES
[facility]  Internal Medicine Teaching Residency Program  Inpatient Daily Progress Note  ______________________________________________________________________________    Patient: Flip Carrion  YOB: 1955   TWJ:[id number]    Acct: [de-identified]     Room: [facility]  Admit date: 8/29/2021  Today's date: 08/31/21  Number of days in the hospital: 2    SUBJECTIVE   Admitting Diagnosis: Pneumonia of right lower lobe due to infectious organism  CC: Altered mental status  Pt seen & examined at bedside. Chart & results reviewed. No acute events overnight. He is hemodynamically stable. He feels much better. Cough has improved, phlegm is reduced, its thinner and whitish. Denies fever or chills. Will add Mucinex and use of incentive spirometry is encouraged. Pleuritic chest pain is better. Denies shortness of breath, palpitations, dizziness, abdominal pain. His appetite is well and he denies any bowel/bladder complaints. He is on IV cefepime and vancomycin through PICC line for left lower extremity stump. He needs 1 more week of IV antibiotics. He passed barium swallow study yesterday  Patient denies going to SNF,  working on it. Pertinent labs:  WBC 22.6-> 14-> 2.4  Hb 9.8-> 9.8 -> 10.6  Glucose 118  Creatinine 1.05 BUN 24  Mg 1.8  K 3.1, replaced  CXR shows stable b/l lung infiltrates, most consistent with infectious/inflammatory etiology. EKG showed early repolarization changes. ROS:  Constitutional:  negative for chills, fevers, sweats  Respiratory:  negative for cough, dyspnea on exertion, hemoptysis, shortness of breath, wheezing  Cardiovascular:  negative for chest pain, chest pressure/discomfort, lower extremity edema, palpitations  Gastrointestinal:  negative for abdominal pain, constipation, diarrhea, nausea, vomiting  Neurological:  negative for dizziness, headache  BRIEF HISTORY   A 63 y.o.   male was brought to the ED from nursing home as he was unresponsive this a.m. He has PMH of DM (on insulin), COPD, esophageal cancer (s/p esophagectomy), bilateral below-knee amputation, PE (on Eliquis), HLD, MRSA sepsis. He was recently admitted (8/10/2021) for infection of left lower extremity stump. He was discharged on vancomycin and cefepime through PICC line for 6 weeks. On my evaluation, He is saturating well on 4 L NC, /48, HR 59. He is sleepy, not responding to voice commands but arousable with deep sternal rub. He c/o dull frontal headache, denies nausea, fever, chest pain, cough, shortness of breath, palpitation, abdominal pain, or bowel/bladder complaints. He goes back to sleep within seconds of waking up. Unable to obtain much history from him. Will try to obtain more history from him once his mentation improves. Information is also obtained from patient's daughter and EMR. Patient's daughter was concerned about possible unintentional Ativan overdose at the nursing home. In the ER, on arrival-he was drowsy, with no intelligible speech, withdrawing to pain with no purposeful movement. Intubation was considered but he woke up while obtaining Covid swab. He is maintaining saturation well on 4 L NC but remains drowsy. Breath sounds reduced and rhonchi appreciated on the right side. Work-up in ED:  EKG-unremarkable  proBNP 390  Troponin 35  Hb 9.6  WBC 22.6,  CMP: Unremarkable except CO2 15, , albumin 2.9  Creat 1.05, BUN 34  ammonia 41  Lactic acid 1.7  VBG-pH 7.2, PO2 59.7, HCO3 17.6  CXR-bilateral interstitial and airspace disease-  asymmetric edema vs multifocal infection vs pneumonitis. CT chest PE-no acute pulmonary embolism, bilateral lower lobe scarring and rounded atelectasis, diffuse tree in bud nodularity throughout right lung-likely infectious inflammatory.   CT head-no acute intracranial abnormality  TSH 0.29, thyroxine 0.85  UA unremarkable except trace ketones Hgb and +3 PU  OBJECTIVE     Vital Signs:  BP (!) 135/59   Pulse 89   Temp 98 °F (36.7 °C) (Oral)   Resp 24   Ht 6' 1\" (1.854 m)   Wt 164 lb 10.9 oz (74.7 kg)   SpO2 94%   BMI 21.73 kg/m²     Temp (24hrs), Av.8 °F (36.6 °C), Min:97.5 °F (36.4 °C), Max:98.6 °F (37 °C)    No intake/output data recorded. Physical Exam:  Constitutional: This is a well developed, well nourished, 18.5-24.9 - Normal 63y.o. year old male who is alert, oriented, cooperative and in no apparent distress. Head:normocephalic and atraumatic. EENT:  PERRLA. No conjunctival injections. Septum was midline, mucosa was without erythema, exudates or cobblestoning. No thrush was noted. Neck: Supple without thyromegaly. No elevated JVP. Trachea was midline. Respiratory: Chest was symmetrical without dullness to percussion. Breath sounds bilaterally were clear to auscultation. There were no wheezes, rhonchi or rales. There is no intercostal retraction or use of accessory muscles. No egophony noted. Cardiovascular: Regular without murmur, clicks, gallops or rubs. Abdomen: Slightly rounded and soft without organomegaly. No rebound, rigidity or guarding was appreciated. Lymphatic: No lymphadenopathy. Musculoskeletal: Normal curvature of the spine. No gross muscle weakness. Extremities:  No lower extremity edema, ulcerations, tenderness, varicosities or erythema. Muscle size, tone and strength are normal.  No involuntary movements are noted. Skin:  Warm and dry. Good color, turgor and pigmentation. No lesions or scars.   No cyanosis or clubbing  Neurological/Psychiatric: The patient's general behavior, level of consciousness, thought content and emotional status is normal.        Medications:  Scheduled Medications:    vancomycin  1,000 mg IntraVENous Q24H    famotidine  20 mg Oral BID    vancomycin (VANCOCIN) intermittent dosing (placeholder)   Other RX Placeholder    amitriptyline  75 mg Oral Nightly    apixaban  5 mg Oral BID    budesonide-formoterol  2 puff Inhalation BID    ferrous sulfate  325 mg Oral BID    insulin glargine  25 Units SubCUTAneous Dinner    lactobacillus  1 capsule Oral BID    tamsulosin  0.4 mg Oral Daily    sodium chloride flush  5-40 mL IntraVENous 2 times per day    cefepime  2,000 mg IntraVENous Q12H    insulin lispro  0-12 Units SubCUTAneous TID WC    insulin lispro  0-6 Units SubCUTAneous Nightly     Continuous Infusions:    sodium chloride      dextrose       PRN Medicationsalbuterol sulfate HFA, 2 puff, Q6H PRN  ipratropium-albuterol, 3 mL, Q4H PRN  sodium chloride flush, 5-40 mL, PRN  sodium chloride, 25 mL, PRN  ondansetron, 4 mg, Q8H PRN   Or  ondansetron, 4 mg, Q6H PRN  polyethylene glycol, 17 g, Daily PRN  acetaminophen, 650 mg, Q6H PRN   Or  acetaminophen, 650 mg, Q6H PRN  glucose, 15 g, PRN  dextrose, 12.5 g, PRN  glucagon (rDNA), 1 mg, PRN  dextrose, 100 mL/hr, PRN  albuterol, 2.5 mg, As Directed RT PRN        Diagnostic Labs:  CBC:   Recent Labs     08/29/21  1123 08/30/21 0411 08/31/21  0037   WBC 22.6* 14.0* 12.4*   RBC 3.67* 3.75* 4.09*   HGB 9.6* 9.8* 10.6*   HCT 31.9* 32.0* 35.3*   MCV 86.9 85.3 86.3   RDW 16.4* 16.7* 16.8*    286 330     BMP:   Recent Labs     08/29/21  1123 08/30/21  0411 08/31/21  0037    137 139   K 3.9 3.4* 3.4*   * 112* 109*   CO2 15* 16* 17*   BUN 34* 24* 15   CREATININE 1.12 1.05 0.71     BNP: No results for input(s): BNP in the last 72 hours. PT/INR: No results for input(s): PROTIME, INR in the last 72 hours. APTT: No results for input(s): APTT in the last 72 hours. CARDIAC ENZYMES: No results for input(s): CKMB, CKMBINDEX, TROPONINI in the last 72 hours.     Invalid input(s): CKTOTAL;3  FASTING LIPID PANEL:  Lab Results   Component Value Date    CHOL 174 06/24/2018    HDL 49 06/24/2018    TRIG 175 (H) 06/24/2018     LIVER PROFILE:   Recent Labs     08/29/21  1123   AST 8   ALT 32   BILITOT 0.19*   ALKPHOS 150* MICROBIOLOGY:   Lab Results   Component Value Date/Time    CULTURE NO GROWTH 1 DAY 08/29/2021 11:45 AM       Imaging:    CT HEAD WO CONTRAST    Result Date: 8/29/2021  No acute intracranial abnormality. CT ABDOMEN PELVIS W IV CONTRAST Additional Contrast? None    Result Date: 8/29/2021  Bilateral nephrolithiasis. Bilateral renal cysts. Hepatomegaly. XR CHEST PORTABLE    Result Date: 8/29/2021  Bilateral interstitial and airspace disease representing any form of asymmetric edema, multifocal infection, or pneumonitis. CT CHEST PULMONARY EMBOLISM W CONTRAST    Result Date: 8/29/2021  No acute pulmonary artery embolism. Postsurgical changes secondary to esophagectomy and gastric pull-through. Bilateral lower lobe scarring and round atelectasis. New diffuse tree-in-bud nodularity throughout the right upper and right lower lobe with additional right lower lobe peribronchial thickening and increasing subpleural airspace disease and peribronchial thickening all suggesting potential infectious/inflammatory etiology. Recommend short-term interval follow-up evaluation to ensure stability or resolution. ASSESSMENT & PLAN   IMPRESSION  This is a 63 y.o. male who presented with altered mental status and found to have pneumonia. Patient admitted to inpatient status evaluate and treat AMS and pneumonia.        Acute alteration in mental status: Resolved  UA/UDS-unremarkable  We will monitor electrolytes  CT head-no acute intracranial abnormality.     Possible aspiration pneumonia:  History of esophagectomy. Reduced breath sounds, rhonchi present on right side  CXR and CT chest shows bilateral interstitial and airspace disease with new diffuse tree-in-bud nodularity in the right lung-pneumonia picture, possibly aspiration. Respiratory panel, molecular-remarkable  On vancomycin and cefepime  Mucinex  Spirometry  He passed barium swallow study.       Anemia :  Continue ferrous sulfate 325      Diabetes mellitus:  Insulin Lantus 25 units  Insulin Humalog  On medium dose insulin sliding scale     DVT ppx: On Eliquis     GI ppx: On Protonix     PT/OT/SW:  Ongoing     Discharge Planning:   to assist in discharge Kiana Sandra  Internal Medicine Resident, PGY-1  [facility];  [place], [place]  8/31/2021, 5:32 AM

## 2021-09-01 LAB
-: NORMAL
ABSOLUTE EOS #: 0.48 K/UL (ref 0–0.44)
ABSOLUTE IMMATURE GRANULOCYTE: 0.05 K/UL (ref 0–0.3)
ABSOLUTE LYMPH #: 2.12 K/UL (ref 1.1–3.7)
ABSOLUTE MONO #: 1.03 K/UL (ref 0.1–1.2)
ANION GAP SERPL CALCULATED.3IONS-SCNC: 11 MMOL/L (ref 9–17)
BASOPHILS # BLD: 1 % (ref 0–2)
BASOPHILS ABSOLUTE: 0.12 K/UL (ref 0–0.2)
BUN BLDV-MCNC: 15 MG/DL (ref 8–23)
BUN/CREAT BLD: ABNORMAL (ref 9–20)
CALCIUM SERPL-MCNC: 9.1 MG/DL (ref 8.6–10.4)
CHLORIDE BLD-SCNC: 106 MMOL/L (ref 98–107)
CO2: 21 MMOL/L (ref 20–31)
CREAT SERPL-MCNC: 0.69 MG/DL (ref 0.7–1.2)
DIFFERENTIAL TYPE: ABNORMAL
EOSINOPHILS RELATIVE PERCENT: 5 % (ref 1–4)
GFR AFRICAN AMERICAN: >60 ML/MIN
GFR NON-AFRICAN AMERICAN: >60 ML/MIN
GFR SERPL CREATININE-BSD FRML MDRD: ABNORMAL ML/MIN/{1.73_M2}
GFR SERPL CREATININE-BSD FRML MDRD: ABNORMAL ML/MIN/{1.73_M2}
GLUCOSE BLD-MCNC: 154 MG/DL (ref 75–110)
GLUCOSE BLD-MCNC: 159 MG/DL (ref 70–99)
GLUCOSE BLD-MCNC: 215 MG/DL (ref 75–110)
GLUCOSE BLD-MCNC: 220 MG/DL (ref 75–110)
GLUCOSE BLD-MCNC: 234 MG/DL (ref 75–110)
HCT VFR BLD CALC: 38 % (ref 40.7–50.3)
HEMOGLOBIN: 11.1 G/DL (ref 13–17)
IMMATURE GRANULOCYTES: 1 %
LYMPHOCYTES # BLD: 21 % (ref 24–43)
MAGNESIUM: 2 MG/DL (ref 1.6–2.6)
MCH RBC QN AUTO: 25.8 PG (ref 25.2–33.5)
MCHC RBC AUTO-ENTMCNC: 29.2 G/DL (ref 28.4–34.8)
MCV RBC AUTO: 88.4 FL (ref 82.6–102.9)
MONOCYTES # BLD: 10 % (ref 3–12)
NRBC AUTOMATED: 0 PER 100 WBC
PDW BLD-RTO: 16.8 % (ref 11.8–14.4)
PLATELET # BLD: 326 K/UL (ref 138–453)
PLATELET ESTIMATE: ABNORMAL
PMV BLD AUTO: 10.3 FL (ref 8.1–13.5)
POTASSIUM SERPL-SCNC: 3.4 MMOL/L (ref 3.7–5.3)
RBC # BLD: 4.3 M/UL (ref 4.21–5.77)
RBC # BLD: ABNORMAL 10*6/UL
REASON FOR REJECTION: NORMAL
SEG NEUTROPHILS: 62 % (ref 36–65)
SEGMENTED NEUTROPHILS ABSOLUTE COUNT: 6.29 K/UL (ref 1.5–8.1)
SODIUM BLD-SCNC: 138 MMOL/L (ref 135–144)
WBC # BLD: 10.1 K/UL (ref 3.5–11.3)
WBC # BLD: ABNORMAL 10*3/UL
ZZ NTE CLEAN UP: ORDERED TEST: NORMAL
ZZ NTE WITH NAME CLEAN UP: SPECIMEN SOURCE: NORMAL

## 2021-09-01 PROCEDURE — 82947 ASSAY GLUCOSE BLOOD QUANT: CPT

## 2021-09-01 PROCEDURE — 6370000000 HC RX 637 (ALT 250 FOR IP)

## 2021-09-01 PROCEDURE — 6370000000 HC RX 637 (ALT 250 FOR IP): Performed by: STUDENT IN AN ORGANIZED HEALTH CARE EDUCATION/TRAINING PROGRAM

## 2021-09-01 PROCEDURE — 94761 N-INVAS EAR/PLS OXIMETRY MLT: CPT

## 2021-09-01 PROCEDURE — 6360000002 HC RX W HCPCS: Performed by: STUDENT IN AN ORGANIZED HEALTH CARE EDUCATION/TRAINING PROGRAM

## 2021-09-01 PROCEDURE — 2060000000 HC ICU INTERMEDIATE R&B

## 2021-09-01 PROCEDURE — 80048 BASIC METABOLIC PNL TOTAL CA: CPT

## 2021-09-01 PROCEDURE — 2580000003 HC RX 258: Performed by: STUDENT IN AN ORGANIZED HEALTH CARE EDUCATION/TRAINING PROGRAM

## 2021-09-01 PROCEDURE — 97110 THERAPEUTIC EXERCISES: CPT

## 2021-09-01 PROCEDURE — 99232 SBSQ HOSP IP/OBS MODERATE 35: CPT | Performed by: INTERNAL MEDICINE

## 2021-09-01 PROCEDURE — 36415 COLL VENOUS BLD VENIPUNCTURE: CPT

## 2021-09-01 PROCEDURE — 94640 AIRWAY INHALATION TREATMENT: CPT

## 2021-09-01 PROCEDURE — 6360000002 HC RX W HCPCS: Performed by: INTERNAL MEDICINE

## 2021-09-01 PROCEDURE — 85025 COMPLETE CBC W/AUTO DIFF WBC: CPT

## 2021-09-01 PROCEDURE — 83735 ASSAY OF MAGNESIUM: CPT

## 2021-09-01 RX ORDER — POTASSIUM CHLORIDE 20 MEQ/1
40 TABLET, EXTENDED RELEASE ORAL ONCE
Status: COMPLETED | OUTPATIENT
Start: 2021-09-01 | End: 2021-09-01

## 2021-09-01 RX ORDER — SIMETHICONE 80 MG
80 TABLET,CHEWABLE ORAL EVERY 6 HOURS PRN
Status: DISCONTINUED | OUTPATIENT
Start: 2021-09-01 | End: 2021-09-08 | Stop reason: HOSPADM

## 2021-09-01 RX ORDER — OXYCODONE HYDROCHLORIDE AND ACETAMINOPHEN 5; 325 MG/1; MG/1
1 TABLET ORAL EVERY 8 HOURS PRN
Status: DISCONTINUED | OUTPATIENT
Start: 2021-09-01 | End: 2021-09-08 | Stop reason: HOSPADM

## 2021-09-01 RX ADMIN — CEFEPIME 2000 MG: 2 INJECTION, POWDER, FOR SOLUTION INTRAVENOUS at 07:35

## 2021-09-01 RX ADMIN — METOPROLOL TARTRATE 25 MG: 25 TABLET ORAL at 11:45

## 2021-09-01 RX ADMIN — GUAIFENESIN 600 MG: 600 TABLET, EXTENDED RELEASE ORAL at 09:00

## 2021-09-01 RX ADMIN — TAMSULOSIN HYDROCHLORIDE 0.4 MG: 0.4 CAPSULE ORAL at 09:00

## 2021-09-01 RX ADMIN — CEFEPIME 2000 MG: 2 INJECTION, POWDER, FOR SOLUTION INTRAVENOUS at 21:37

## 2021-09-01 RX ADMIN — FAMOTIDINE 20 MG: 20 TABLET, FILM COATED ORAL at 09:00

## 2021-09-01 RX ADMIN — FAMOTIDINE 20 MG: 20 TABLET, FILM COATED ORAL at 20:24

## 2021-09-01 RX ADMIN — SIMETHICONE 80 MG: 80 TABLET, CHEWABLE ORAL at 22:34

## 2021-09-01 RX ADMIN — BUDESONIDE AND FORMOTEROL FUMARATE DIHYDRATE 2 PUFF: 160; 4.5 AEROSOL RESPIRATORY (INHALATION) at 20:50

## 2021-09-01 RX ADMIN — INSULIN LISPRO 2 UNITS: 100 INJECTION, SOLUTION INTRAVENOUS; SUBCUTANEOUS at 09:00

## 2021-09-01 RX ADMIN — FERROUS SULFATE TAB EC 325 MG (65 MG FE EQUIVALENT) 325 MG: 325 (65 FE) TABLET DELAYED RESPONSE at 09:00

## 2021-09-01 RX ADMIN — Medication 1 CAPSULE: at 08:59

## 2021-09-01 RX ADMIN — BUDESONIDE AND FORMOTEROL FUMARATE DIHYDRATE 2 PUFF: 160; 4.5 AEROSOL RESPIRATORY (INHALATION) at 10:28

## 2021-09-01 RX ADMIN — INSULIN LISPRO 4 UNITS: 100 INJECTION, SOLUTION INTRAVENOUS; SUBCUTANEOUS at 16:31

## 2021-09-01 RX ADMIN — APIXABAN 5 MG: 5 TABLET, FILM COATED ORAL at 20:23

## 2021-09-01 RX ADMIN — INSULIN GLARGINE 25 UNITS: 100 INJECTION, SOLUTION SUBCUTANEOUS at 16:31

## 2021-09-01 RX ADMIN — OXYCODONE HYDROCHLORIDE AND ACETAMINOPHEN 1 TABLET: 5; 325 TABLET ORAL at 20:22

## 2021-09-01 RX ADMIN — VANCOMYCIN HYDROCHLORIDE 1000 MG: 1 INJECTION, SOLUTION INTRAVENOUS at 05:46

## 2021-09-01 RX ADMIN — INSULIN LISPRO 2 UNITS: 100 INJECTION, SOLUTION INTRAVENOUS; SUBCUTANEOUS at 21:37

## 2021-09-01 RX ADMIN — SODIUM CHLORIDE, PRESERVATIVE FREE 10 ML: 5 INJECTION INTRAVENOUS at 09:00

## 2021-09-01 RX ADMIN — POTASSIUM CHLORIDE 40 MEQ: 1500 TABLET, EXTENDED RELEASE ORAL at 11:45

## 2021-09-01 RX ADMIN — GUAIFENESIN 600 MG: 600 TABLET, EXTENDED RELEASE ORAL at 20:24

## 2021-09-01 RX ADMIN — AMITRIPTYLINE HYDROCHLORIDE 75 MG: 50 TABLET, FILM COATED ORAL at 20:23

## 2021-09-01 RX ADMIN — FERROUS SULFATE TAB EC 325 MG (65 MG FE EQUIVALENT) 325 MG: 325 (65 FE) TABLET DELAYED RESPONSE at 20:24

## 2021-09-01 RX ADMIN — APIXABAN 5 MG: 5 TABLET, FILM COATED ORAL at 09:00

## 2021-09-01 RX ADMIN — METOPROLOL TARTRATE 25 MG: 25 TABLET ORAL at 20:23

## 2021-09-01 RX ADMIN — SODIUM CHLORIDE, PRESERVATIVE FREE 10 ML: 5 INJECTION INTRAVENOUS at 20:24

## 2021-09-01 RX ADMIN — Medication 1 CAPSULE: at 20:22

## 2021-09-01 RX ADMIN — OXYCODONE HYDROCHLORIDE AND ACETAMINOPHEN 1 TABLET: 5; 325 TABLET ORAL at 11:50

## 2021-09-01 ASSESSMENT — PAIN SCALES - GENERAL
PAINLEVEL_OUTOF10: 0
PAINLEVEL_OUTOF10: 7
PAINLEVEL_OUTOF10: 8
PAINLEVEL_OUTOF10: 8

## 2021-09-01 ASSESSMENT — PAIN DESCRIPTION - LOCATION
LOCATION: LEG
LOCATION: LEG

## 2021-09-01 ASSESSMENT — PAIN DESCRIPTION - ONSET: ONSET: ON-GOING

## 2021-09-01 ASSESSMENT — PAIN DESCRIPTION - PAIN TYPE
TYPE: PHANTOM PAIN
TYPE: PHANTOM PAIN

## 2021-09-01 ASSESSMENT — PAIN DESCRIPTION - ORIENTATION
ORIENTATION: LEFT
ORIENTATION: LEFT

## 2021-09-01 ASSESSMENT — PAIN DESCRIPTION - FREQUENCY: FREQUENCY: CONTINUOUS

## 2021-09-01 ASSESSMENT — PAIN DESCRIPTION - DESCRIPTORS
DESCRIPTORS: ACHING;DULL
DESCRIPTORS: SHOOTING

## 2021-09-01 NOTE — CARE COORDINATION
Transitional planning. Maribel Arias with [facility] called, they are OON with pt's insurance. [facility] with [address], they can take pt. Call her if she needs to start pre-cert, informed her that [facility] was first choice    1230 LM for Noreen Last with Admissions to call CM back    13:48  Spoke with Noreen Last at [facility]. She is unable to accept referral as patient is not vaccinated for COVID as they require patient to be 2 weeks post COVID vaccinated. 14:15  Received phone call from Jet Chi [phone] at [facility] and patient is on their do not take back list.    14:30  Informed patient of above. Discussed SNF options. Patient agreeable to [address] [place] [address],[address] [place] [address], [facility] as first choice. Patient indicated that he had been at [facility] about 4 months ago and that would be an acceptable choice. 15:32 Referral sent to [address] [place] [address],[address] [place] [address], [facility]. Attempted to contact [facility]. [place] did rang busy. Henrry Mckeoni up after a 15 minute attempt. 3:40  Spoke with Vee at [address] [place] [address],[address] [place] [address], [facility] re: referral.  She said she had not received it but it often takes time in central intake. [facility] patient's name,  and SS#. Vee to review and get back with us as they have a bed available. [address] with [facility] called, they can accept pt and will start pre-cert. Pt's insurance is not participating in waiver program at this time. Informed her that scripts written and AVS completed yesterday state pt is to have Cefepime 2000mg Q 8 hrs X 8 days and Vancomycin 1000mg Q 24 hours X 8 days. Pt will need Rapid COVID test within 72 hours of admission to SNF.

## 2021-09-01 NOTE — PROGRESS NOTES
Physician Progress Note      PATIENT:               Pako Palumbo  CSN #:                  [id number]  :                       1955  ADMIT DATE:       2021 11:06 AM  DISCH DATE:  RESPONDING  PROVIDER #:        Tracy SILVERIO          QUERY TEXT:    Pt admitted with Pneumonia. Pt noted to have \"possible Aspiration Pneumonia\"   documented. If possible, please document in the progress notes and discharge   summary if you are evaluating and/or treating any of the following: The medical record reflects the following:  Risk Factors: Pna, Hx MRSA Sepsis,Lives in Nursing home  Clinical Indicators: To ED fron ECF after found unresponsive-AMS. CXR-bilateral interstitial and airspace disease-  asymmetric edema vs   multifocal infection vs pneumonitis. CT Chest-  rounded atelectasis, diffuse   tree in bud nodularity throughout right lung-likely infectious inflammatory. wbc 22.6  Per H&P ' Possible aspiration pneumonia' History of   esophagectomy. Passed swallow study  Treatment: IV Zosyn,Vancomycin,Cefepime  Options provided:  -- Aspiration pneumonia  -- MRSA pneumonia  -- Bacterial pneumonia  -- Viral pneumonia  -- Other - I will add my own diagnosis  -- Disagree - Not applicable / Not valid  -- Disagree - Clinically unable to determine / Unknown  -- Refer to Clinical Documentation Reviewer    PROVIDER RESPONSE TEXT:    This patient has aspiration pneumonia-confirmed.     Query created by: Faraz Wick on 2021 2:26 PM      Electronically signed by:  Wendi Jaramillo 2021 4:42 PM
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
## 2021-09-01 NOTE — PLAN OF CARE
BRONCHOSPASM/BRONCHOCONSTRICTION     [x]         IMPROVE AERATION/BREATH SOUNDS  [x]   ADMINISTER BRONCHODILATOR THERAPY AS APPROPRIATE  [x]   ASSESS BREATH SOUNDS  [x]   IMPLEMENT AEROSOL/MDI PROTOCOL  [x]   PATIENT EDUCATION AS NEEDED    [facility]ANIE YOUNG, [facility]. Altered mental status, unspecified altered mental status type [R41.82]  Acute alteration in mental status [R41.82] . Vitals:    09/01/21 1028   BP:    Pulse:    Resp: 20   Temp:    SpO2:    . Patients home meds are   Prior to Admission medications    Medication Sig Start Date End Date Taking? Authorizing Provider   guaiFENesin (MUCINEX) 600 MG extended release tablet Take 1 tablet by mouth 2 times daily for 10 days 8/31/21 9/10/21 Yes Unique Robledo MD   cefepime (MAXIPIME) infusion Infuse 2,000 mg intravenously every 8 hours for 8 days Compound per protocol 8/31/21 9/8/21 Yes Unique Robledo MD   vancomycin (VANCOCIN) infusion Infuse 1,000 mg intravenously every 24 hours for 8 days Compound per protocol.  8/31/21 9/8/21 Yes Unique Robledo MD   simethicone (MYLICON) 80 MG chewable tablet Take 1 tablet by mouth every 6 hours as needed for Flatulence 5/27/21   MARCELO Greenberg NP   ferrous sulfate (IRON 325) 325 (65 Fe) MG tablet Take 1 tablet by mouth 2 times daily 5/27/21   MARCELO Greenberg NP   naloxone 4 MG/0.1ML LIQD nasal spray 1 spray by Nasal route as needed for Opioid Reversal 5/27/21   MARCELO Greenberg NP   polyethylene glycol (GLYCOLAX) 17 g packet Take 17 g by mouth daily as needed for Constipation    Historical Provider, MD   Multiple Vitamins-Minerals (THERAPEUTIC MULTIVITAMIN-MINERALS) tablet Take 1 tablet by mouth daily    Historical Provider, MD   aluminum & magnesium hydroxide-simethicone (MAALOX) 200-200-20 MG/5ML SUSP suspension Take 10 mLs by mouth every 6 hours as needed for Indigestion     Historical Provider, MD   ondansetron (ZOFRAN) 4 MG tablet Take 4 mg by mouth every 8 hours as needed for Nausea or Vomiting    Historical Provider, MD   senna (SENOKOT) 8.6 MG tablet Take 2 tablets by mouth 2 times daily as needed for Constipation    Historical Provider, MD   glucose (GLUTOSE) 40 % GEL Take 37.5 mLs by mouth as needed (hypoglycemia) 4/9/21   Mihir Oral Orlop, DO   insulin lispro (HUMALOG) 100 UNIT/ML injection vial Inject 0-6 Units into the skin 3 times daily (with meals) 4/9/21   Maize Oral Orlop, DO   insulin lispro (HUMALOG) 100 UNIT/ML injection vial Inject 0-3 Units into the skin nightly 4/9/21   Maize Oral Orlop, DO   bisacodyl (DULCOLAX) 5 MG EC tablet Take 1 tablet by mouth daily as needed for Constipation 4/9/21   Maize Oral Orlop, DO   tamsulosin (FLOMAX) 0.4 MG capsule Take 1 capsule by mouth daily  Patient taking differently: Take 0.4 mg by mouth nightly  4/10/21   Maize Oral Orlop, DO   hydrOXYzine (VISTARIL) 25 MG capsule Take 25 mg by mouth 3 times daily as needed 3/15/21   Historical Provider, MD   amitriptyline (ELAVIL) 75 MG tablet Take 1 tablet by mouth nightly 3/19/21   Manda Rodríguez DO   glucose monitoring kit (FREESTYLE) monitoring kit 1 kit by Does not apply route daily 3/7/21   Manda Rodríguez DO   blood glucose test strips (ASCENSIA AUTODISC VI;ONE TOUCH ULTRA TEST VI) strip Use with associated glucose meter to check fluctuating blood sugars BID 3/7/21   Manda Rodríguez DO   Lancets MISC 1 each by Does not apply route 3 times daily 3/7/21   Manda Rodríguez DO   famotidine (PEPCID) 20 MG tablet Take 20 mg by mouth 2 times daily    Historical Provider, MD   budesonide-formoterol (SYMBICORT) 160-4.5 MCG/ACT AERO Inhale 2 puffs into the lungs 2 times daily 1/11/21   Deisi De Leon MD   ipratropium-albuterol (DUONEB) 0.5-2.5 (3) MG/3ML SOLN nebulizer solution Inhale 3 mLs into the lungs every 4 hours as needed for Shortness of Breath 1/11/21   Deisi De Leon MD   insulin glargine (LANTUS) 100 UNIT/ML injection vial Inject 25 Units into the skin Daily with supper 1/11/21   Erica APODACA Annie Saunders MD   lactobacillus (BACID) TABS Take 1 tablet by mouth 2 times daily 1/11/21   [address] Felicity Velasquez MD   apixaban (ELIQUIS) 5 MG TABS tablet Take 1 tablet by mouth 2 times daily 9/5/20   Cherylene [place], DO   albuterol sulfate HFA (VENTOLIN HFA) 108 (90 Base) MCG/ACT inhaler Inhale 2 puffs into the lungs every 6 hours as needed for Wheezing    Historical Provider, MD   metFORMIN (GLUCOPHAGE) 500 MG tablet Take 1 tablet by mouth 2 times daily (with meals) 3/9/20   Margaritayledianne Robertsial, DO   . Recent Surgical History: None = 0     Assessment Room air. Alert and awake.  Dry NPC      RR 16-18  Breath Sounds: clear      · Bronchodilator assessment at level  1  · Hyperinflation assessment at level   · Secretion Management assessment at level    ·   · []    Bronchodilator Assessment  BRONCHODILATOR ASSESSMENT SCORE  Score 0 1 2 3 4 5   Breath Sounds   []  Patient Baseline [x]  No Wheeze good aeration []  Faint, scattered wheezing, good aeration []  Expiratory Wheezing and or moderately diminished []  Insp/Exp wheeze and/or very diminished []  Insp/Exp and/ or marked distress   Respiratory Rate   []  Patient Baseline [x]  Less than 20 []  Less than 20 []  20-25 []  Greater than 25 []  Greater than 25   Peak flow % of Pred or PB []  NA   []  Greater than 90%  []  81-90% []  71-80% []  Less than or equal to 70%  or unable to perform []  Unable due to Respiratory Distress   Dyspnea re []  Patient Baseline [x]  No SOB []  No SOB []  SOB on exertion []  SOB min activity []  At rest/acute   e FEV% Predicted       []  NA []  Above 69%  []  Unable []  Above 60-69%  []  Unable []  Above 50-59%  []  Unable []  Above 35-49%  []  Unable []  Less than 35%  []  Unable                 []  Hyperinflation Assessment  Score 1 2 3   CXR and Breath Sounds   []  Clear []  No atelectasis  Basilar aeration []  Atelectasis or absent basilar breath sounds   Incentive Spirometry Volume  (Per IBW)   []  Greater than or equal to 15ml/Kg []  less than 15ml/Kg []  less than 15ml/Kg   Surgery within last 2 weeks []  None or general   []  Abdominal or thoracic surgery  []  Abdominal or thoracic   Chronic Pulmonary Historyre []  No []  Yes []  Yes     []  Secretion Management Assessment  Score 1 2 3   Bilateral Breath Sounds   []  Occasional Rhonchi []  Scattered Rhonchi []  Course Rhonchi and/or poor aeration   Sputum    []  Small amount of thin secretions []  Moderate amount of viscous secretions []  Copius, Viscious Yellow/ Secretions   CXR as reported by physician []  clear  []  Unavailable []  Infiltrates and/or consolidation  []  Unavailable []  Mucus Plugging and or lobar consolidation  []  Unavailable   Cough []  Strong, productive cough []  Weak productive cough []  No cough or weak non-productive cough   Cecile People, [facility]  10:37 AM                            FEMALE                                  MALE                            FEV1 Predicted Normal Values                        FEV1 Predicted Normal Values          Age                                     Height in Feet and Inches       Age                                     Height in Feet and Inches       4' 11\" 5' 1\" 5' 3\" 5' 5\" 5' 7\" 5' 9\" 5' 11\" 6' 1\"  4' 11\" 5' 1\" 5' 3\" 5' 5\" 5' 7\" 5' 9\" 5' 11\" 6' 1\"   42 - 45 2.49 2.66 2.84 3.03 3.22 3.42 3.62 3.83 42 - 45 2.82 3.03 3.26 3.49 3.72 3.96 4.22 4.47   46 - 49 2.40 2.57 2.76 2.94 3.14 3.33 3.54 3.75 46 - 49 2.70 2.92 3.14 3.37 3.61 3.85 4.10 4.36   50 - 53 2.31 2.48 2.66 2.85 3.04 3.24 3.45 3.66 50 - 53 2.58 2.80 3.02 3.25 3.49 3.73 3.98 4.24   54 - 57 2.21 2.38 2.57 2.75 2.95 3.14 3.35 3.56 54 - 57 2.46 2.67 2.89 3.12 3.36 3.60 3.85 4.11   58 - 61 2.10 2.28 2.46 2.65 2.84 3.04 3.24 3.45 58 - 61 2.32 2.54 2.76 2.99 3.23 3.47 3.72 3.98   62 - 65 1.99 2.17 2.35 2.54 2.73 2.93 3.13 3.34 62 - 65 2.19 2.40 2.62 2.85 3.09 3.33 3.58 3.84   66 - 69 1.88 2.05 2.23 2.42 2.61 2.81 3.02 3.23 66 - 69 2.04 2.26 2.48 2.71 2.95 3.19 3.44 3.70   70+ 1.82 1.99 2.17 2.36 2.55 2. 75 2.95 3.16 70+ 1.97 2.19 2.41 2.64 2.87 3.12 3.37 3.62             Predicted Peak Expiratory Flow Rate                                       Height (in)  Female       Height (in) Male           Age 64 63 56 61 58 73 78 74 Age            21 344 357 372 387 402 417 432 446  60 62 64 66 68 70 72 74 76   25 337 352 366 381 396 411 426 441 25 447 476 [phone] [phone]   30 329 344 359 374 389 404 419 434 30 437 466 494 523 552 [phone] 667   35 322 337 351 366 381 396 411 426 35 426 455 484 512 541 [phone]   40 314 329 344 359 374 389 404 419 40 416 445 473 [phone] 647   45 307 322 336 351 366 381 396 411 45 405 434 463 491 520 549 577 606 636   50 299 314 329 344 359 374 389 404 50 395 424 452 481 510 538 567 596 625   55 292 307 321 336 351 366 381 396 55 384 413 442 470 499 528 556 585 615   60 284 299 314 329 344 359 374 389 60 374 403 431 460 489 517 546 575 605   65 277 292 306 321 336 351 366 381 65 363 392 421 449 478 507 535 564 594   70 269 284 299 314 329 344 359 374 70 353 382 410 439 468 [phone] 583   75 261 274 289 305 319 334 348 364 75 344 372 400 429 458 487 515 544 573   80 253 266 282 296 312 327 342 356 80 335 362 390 419 448 476 [phone]

## 2021-09-01 NOTE — PROGRESS NOTES
[facility] [address]  Occupational Therapy Not Seen Note    DATE: 2021    NAME: Roel Copeland  MRN: [id number]   : 1955      Patient not seen this date for Occupational Therapy due to:    Patient Declined: Pt states feeling nausea.         Electronically signed by ARABELLA Cedeño on 2021 at 1:45 PM

## 2021-09-01 NOTE — PROGRESS NOTES
[facility]  Internal Medicine Teaching Residency Program  Inpatient Daily Progress Note  ______________________________________________________________________________    Patient: Chuckie Schreiber  YOB: 1955   AKU:[id number]    Acct: [de-identified]     Room: [facility]  Admit date: 8/29/2021  Today's date: 09/01/21  Number of days in the hospital: 3    SUBJECTIVE   Admitting Diagnosis: Pneumonia of right lower lobe due to infectious organism  CC: AMS  Pt examined at bedside. Chart & results reviewed. Patient hemodynamically stable, afebrile. Patient complains of pain in his amputated RLE. Patient stated he feels better, and denies any abdominal pain, chest pain, shortness of breath. Vitals - patient tachycardic , , BP went up in morning to 152/97, back to 119/63. On exam Right sided rhonchi present on Right lower lung. No acute events over night  Labs show WBC trending down 12.4 --> 10.1  K. 3.4    ROS:  Constitutional:  negative for chills, fevers, sweats  Respiratory:  negative for cough, dyspnea on exertion, hemoptysis, shortness of breath, wheezing  Cardiovascular:  negative for chest pain, chest pressure/discomfort, lower extremity edema, palpitations  Gastrointestinal:  negative for abdominal pain, constipation, diarrhea, nausea, vomiting  Neurological:  negative for dizziness, headache  BRIEF HISTORY     A 65 y.o.  male was brought to the ED from nursing home as he was unresponsive this a.m.   He has PMH of DM (on insulin), COPD, esophageal cancer (s/p esophagectomy), bilateral below-knee amputation, PE (on Eliquis), HLD, MRSA sepsis. He was recently admitted (8/10/2021) for infection of left lower extremity stump. He was discharged on vancomycin and cefepime through PICC line for 6 weeks. On my evaluation, He is saturating well on 4 L NC, /48, HR 59.    He is sleepy, not responding to voice commands but arousable with deep sternal rub.   He c/o dull frontal headache, denies nausea, fever, chest pain, cough, shortness of breath, palpitation, abdominal pain, or bowel/bladder complaints. He goes back to sleep within seconds of waking up. Unable to obtain much history from him.  Will try to obtain more history from him once his mentation improves. Information is also obtained from patient's daughter and EMR. Patient's daughter was concerned about possible unintentional Ativan overdose at the nursing home.  In the ER, on arrival-he was drowsy, with no intelligible speech, withdrawing to pain with no purposeful movement.  Intubation was considered but he woke up while obtaining Covid swab. He is maintaining saturation well on 4 L NC but remains drowsy. Breath sounds reduced and rhonchi appreciated on the right side. Work-up in ED:  EKG-unremarkable  proBNP 390  Troponin 35  Hb 9.6  WBC 22.6,  CMP: Unremarkable except CO2 15, , albumin 2.9  Creat 1.05, BUN 34  ammonia 41  Lactic acid 1.7  VBG-pH 7.2, PO2 59.7, HCO3 17.6  CXR-bilateral interstitial and airspace disease-  asymmetric edema vs multifocal infection vs pneumonitis. CT chest PE-no acute pulmonary embolism, bilateral lower lobe scarring and rounded atelectasis, diffuse tree in bud nodularity throughout right lung-likely infectious inflammatory. CT head-no acute intracranial abnormality  TSH 0.29, thyroxine 0.85  UA unremarkable except trace ketones Hgb and +3 PU    OBJECTIVE     Vital Signs:  /77   Pulse 103   Temp 98 °F (36.7 °C) (Oral)   Resp 16   Ht 6' 1\" (1.854 m)   Wt 164 lb 10.9 oz (74.7 kg)   SpO2 98%   BMI 21.73 kg/m²     Temp (24hrs), Av.9 °F (36.6 °C), Min:97.6 °F (36.4 °C), Max:98 °F (36.7 °C)    In: 686   Out: 750 [Urine:750]    Physical Exam:  Constitutional: This is a well developed, well nourished, 18.5-24.9 - Normal 63y.o. year old male who is alert, oriented, cooperative and in no apparent distress.   Head:normocephalic and atraumatic. EENT:  PERRLA. No conjunctival injections. Septum was midline, mucosa was without erythema, exudates or cobblestoning. No thrush was noted. Neck: Supple without thyromegaly. No elevated JVP. Trachea was midline. Respiratory: Chest was symmetrical without dullness to percussion. Breath sounds bilaterally were clear to auscultation. There were no wheezes, rhonchi or rales. There is no intercostal retraction or use of accessory muscles. No egophony noted. Cardiovascular: Regular without murmur, clicks, gallops or rubs. Abdomen: Slightly rounded and soft without organomegaly. No rebound, rigidity or guarding was appreciated. Lymphatic: No lymphadenopathy. Musculoskeletal: Normal curvature of the spine. No gross muscle weakness. Extremities:  No lower extremity edema, ulcerations, tenderness, varicosities or erythema. Muscle size, tone and strength are normal.  No involuntary movements are noted. Skin:  Warm and dry. Good color, turgor and pigmentation. No lesions or scars.   No cyanosis or clubbing  Neurological/Psychiatric: The patient's general behavior, level of consciousness, thought content and emotional status is normal.        Medications:  Scheduled Medications:    metoprolol tartrate  25 mg Oral BID    guaiFENesin  600 mg Oral BID    vancomycin  1,000 mg IntraVENous Q24H    famotidine  20 mg Oral BID    vancomycin (VANCOCIN) intermittent dosing (placeholder)   Other RX Placeholder    amitriptyline  75 mg Oral Nightly    apixaban  5 mg Oral BID    budesonide-formoterol  2 puff Inhalation BID    ferrous sulfate  325 mg Oral BID    insulin glargine  25 Units SubCUTAneous Dinner    lactobacillus  1 capsule Oral BID    tamsulosin  0.4 mg Oral Daily    sodium chloride flush  5-40 mL IntraVENous 2 times per day    cefepime  2,000 mg IntraVENous Q12H    insulin lispro  0-12 Units SubCUTAneous TID     insulin lispro  0-6 Units SubCUTAneous Nightly     Continuous infiltrates, most consistent with infectious/inflammatory etiology in the appropriate clinical setting. XR CHEST PORTABLE    Result Date: 8/29/2021  Bilateral interstitial and airspace disease representing any form of asymmetric edema, multifocal infection, or pneumonitis. CT CHEST PULMONARY EMBOLISM W CONTRAST    Result Date: 8/29/2021  No acute pulmonary artery embolism. Postsurgical changes secondary to esophagectomy and gastric pull-through. Bilateral lower lobe scarring and round atelectasis. New diffuse tree-in-bud nodularity throughout the right upper and right lower lobe with additional right lower lobe peribronchial thickening and increasing subpleural airspace disease and peribronchial thickening all suggesting potential infectious/inflammatory etiology. Recommend short-term interval follow-up evaluation to ensure stability or resolution. FL MODIFIED BARIUM SWALLOW W VIDEO    Result Date: 8/30/2021  No penetration or aspiration with the above administered substances. Please see separate speech pathology report for full discussion of findings and recommendations. ASSESSMENT & PLAN     ASSESSMENT / PLAN:     Principal Problem:    Pneumonia of right lower lobe due to infectious organism  Active Problems:    Type 2 diabetes mellitus with diabetic polyneuropathy, with long-term current use of insulin (HCC)    Esophageal cancer (HCC)    COPD without exacerbation (HCC)    HLD (hyperlipidemia)    PAD (peripheral artery disease) (HCC)    S/P BKA (below knee amputation) bilateral (HCC)    Moderate malnutrition ([address]    History of below knee amputation, right ([address]    History of below knee amputation, left ([address]    Metabolic encephalopathy    Altered mental status  Resolved Problems:    * No resolved hospital problems. *    Acute alteration in mental status: Resolved  · UA/UDS-unremarkable  · We will monitor electrolytes  · CT head-no acute intracranial abnormality.     Aspiration pneumonia  · History of esophagectomy.    · Reduced breath sounds, rhonchi present on right side  · CXR and CT chest shows bilateral interstitial and airspace disease with new diffuse tree-in-bud nodularity in the right lung-pneumonia picture, possibly aspiration. · Respiratory panel, molecular-remarkable  · On vancomycin 1 g q24 h and cefepime 2 g q12h  · Continue Mucinex  · Spirometry  · He passed barium swallow study. Diabetes mellitus  · On lantus 25 U  · On medium dose sliding scale    Hypertension  · on Lopresor 25 mg qd po    Hypokalemia, K 3.4  - replaced    DVT ppx : eliquis  GI ppx: pepcid    PT/OT: on going  Discharge Planning / SW:  on board, referral sent to [facility]    Babita Prabhakar MD  Internal Medicine Resident, PGY-1  [facility];  [place], [place]  9/1/2021, 2:36 PM

## 2021-09-02 ENCOUNTER — APPOINTMENT (OUTPATIENT)
Dept: GENERAL RADIOLOGY | Age: 66
DRG: 177 | End: 2021-09-02
Payer: MEDICARE

## 2021-09-02 LAB
ABSOLUTE EOS #: 0.33 K/UL (ref 0–0.44)
ABSOLUTE IMMATURE GRANULOCYTE: 0.04 K/UL (ref 0–0.3)
ABSOLUTE LYMPH #: 2.08 K/UL (ref 1.1–3.7)
ABSOLUTE MONO #: 0.97 K/UL (ref 0.1–1.2)
ANION GAP SERPL CALCULATED.3IONS-SCNC: 10 MMOL/L (ref 9–17)
BASOPHILS # BLD: 1 % (ref 0–2)
BASOPHILS ABSOLUTE: 0.13 K/UL (ref 0–0.2)
BUN BLDV-MCNC: 18 MG/DL (ref 8–23)
BUN/CREAT BLD: ABNORMAL (ref 9–20)
CALCIUM SERPL-MCNC: 9.5 MG/DL (ref 8.6–10.4)
CHLORIDE BLD-SCNC: 107 MMOL/L (ref 98–107)
CO2: 23 MMOL/L (ref 20–31)
CREAT SERPL-MCNC: 1.05 MG/DL (ref 0.7–1.2)
DIFFERENTIAL TYPE: ABNORMAL
EOSINOPHILS RELATIVE PERCENT: 4 % (ref 1–4)
GFR AFRICAN AMERICAN: >60 ML/MIN
GFR NON-AFRICAN AMERICAN: >60 ML/MIN
GFR SERPL CREATININE-BSD FRML MDRD: ABNORMAL ML/MIN/{1.73_M2}
GFR SERPL CREATININE-BSD FRML MDRD: ABNORMAL ML/MIN/{1.73_M2}
GLUCOSE BLD-MCNC: 136 MG/DL (ref 75–110)
GLUCOSE BLD-MCNC: 157 MG/DL (ref 75–110)
GLUCOSE BLD-MCNC: 164 MG/DL (ref 75–110)
GLUCOSE BLD-MCNC: 176 MG/DL (ref 75–110)
GLUCOSE BLD-MCNC: 217 MG/DL (ref 70–99)
GLUCOSE BLD-MCNC: 277 MG/DL (ref 75–110)
HCT VFR BLD CALC: 37.4 % (ref 40.7–50.3)
HEMOGLOBIN: 11.3 G/DL (ref 13–17)
IMMATURE GRANULOCYTES: 0 %
LYMPHOCYTES # BLD: 23 % (ref 24–43)
MCH RBC QN AUTO: 25.6 PG (ref 25.2–33.5)
MCHC RBC AUTO-ENTMCNC: 30.2 G/DL (ref 28.4–34.8)
MCV RBC AUTO: 84.8 FL (ref 82.6–102.9)
MONOCYTES # BLD: 11 % (ref 3–12)
NRBC AUTOMATED: 0 PER 100 WBC
PDW BLD-RTO: 16.4 % (ref 11.8–14.4)
PLATELET # BLD: 369 K/UL (ref 138–453)
PLATELET ESTIMATE: ABNORMAL
PMV BLD AUTO: 9.8 FL (ref 8.1–13.5)
POTASSIUM SERPL-SCNC: 4.4 MMOL/L (ref 3.7–5.3)
RBC # BLD: 4.41 M/UL (ref 4.21–5.77)
RBC # BLD: ABNORMAL 10*6/UL
SARS-COV-2, RAPID: NOT DETECTED
SEG NEUTROPHILS: 61 % (ref 36–65)
SEGMENTED NEUTROPHILS ABSOLUTE COUNT: 5.58 K/UL (ref 1.5–8.1)
SODIUM BLD-SCNC: 140 MMOL/L (ref 135–144)
SPECIMEN DESCRIPTION: NORMAL
VANCOMYCIN TROUGH DATE LAST DOSE: NORMAL
VANCOMYCIN TROUGH DOSE AMOUNT: NORMAL
VANCOMYCIN TROUGH TIME LAST DOSE: NORMAL
VANCOMYCIN TROUGH: 13.4 UG/ML (ref 10–20)
WBC # BLD: 9.1 K/UL (ref 3.5–11.3)
WBC # BLD: ABNORMAL 10*3/UL

## 2021-09-02 PROCEDURE — 2580000003 HC RX 258: Performed by: STUDENT IN AN ORGANIZED HEALTH CARE EDUCATION/TRAINING PROGRAM

## 2021-09-02 PROCEDURE — C1751 CATH, INF, PER/CENT/MIDLINE: HCPCS

## 2021-09-02 PROCEDURE — 6370000000 HC RX 637 (ALT 250 FOR IP): Performed by: STUDENT IN AN ORGANIZED HEALTH CARE EDUCATION/TRAINING PROGRAM

## 2021-09-02 PROCEDURE — 6360000002 HC RX W HCPCS: Performed by: STUDENT IN AN ORGANIZED HEALTH CARE EDUCATION/TRAINING PROGRAM

## 2021-09-02 PROCEDURE — 80202 ASSAY OF VANCOMYCIN: CPT

## 2021-09-02 PROCEDURE — 36415 COLL VENOUS BLD VENIPUNCTURE: CPT

## 2021-09-02 PROCEDURE — 80048 BASIC METABOLIC PNL TOTAL CA: CPT

## 2021-09-02 PROCEDURE — 71045 X-RAY EXAM CHEST 1 VIEW: CPT

## 2021-09-02 PROCEDURE — 2580000003 HC RX 258: Performed by: INTERNAL MEDICINE

## 2021-09-02 PROCEDURE — 85025 COMPLETE CBC W/AUTO DIFF WBC: CPT

## 2021-09-02 PROCEDURE — 6360000002 HC RX W HCPCS: Performed by: INTERNAL MEDICINE

## 2021-09-02 PROCEDURE — 94640 AIRWAY INHALATION TREATMENT: CPT

## 2021-09-02 PROCEDURE — 6370000000 HC RX 637 (ALT 250 FOR IP)

## 2021-09-02 PROCEDURE — 99232 SBSQ HOSP IP/OBS MODERATE 35: CPT | Performed by: INTERNAL MEDICINE

## 2021-09-02 PROCEDURE — 2060000000 HC ICU INTERMEDIATE R&B

## 2021-09-02 PROCEDURE — B548ZZA ULTRASONOGRAPHY OF SUPERIOR VENA CAVA, GUIDANCE: ICD-10-PCS | Performed by: INTERNAL MEDICINE

## 2021-09-02 PROCEDURE — 87635 SARS-COV-2 COVID-19 AMP PRB: CPT

## 2021-09-02 PROCEDURE — 02HV33Z INSERTION OF INFUSION DEVICE INTO SUPERIOR VENA CAVA, PERCUTANEOUS APPROACH: ICD-10-PCS | Performed by: INTERNAL MEDICINE

## 2021-09-02 PROCEDURE — 82947 ASSAY GLUCOSE BLOOD QUANT: CPT

## 2021-09-02 PROCEDURE — 94760 N-INVAS EAR/PLS OXIMETRY 1: CPT

## 2021-09-02 PROCEDURE — 76937 US GUIDE VASCULAR ACCESS: CPT

## 2021-09-02 PROCEDURE — 36569 INSJ PICC 5 YR+ W/O IMAGING: CPT

## 2021-09-02 RX ORDER — SODIUM PHOSPHATE, DIBASIC AND SODIUM PHOSPHATE, MONOBASIC 7; 19 G/133ML; G/133ML
1 ENEMA RECTAL
Status: COMPLETED | OUTPATIENT
Start: 2021-09-02 | End: 2021-09-02

## 2021-09-02 RX ORDER — SODIUM CHLORIDE 9 MG/ML
25 INJECTION, SOLUTION INTRAVENOUS PRN
Status: DISCONTINUED | OUTPATIENT
Start: 2021-09-02 | End: 2021-09-08 | Stop reason: HOSPADM

## 2021-09-02 RX ORDER — BISACODYL 10 MG
10 SUPPOSITORY, RECTAL RECTAL DAILY PRN
Status: DISCONTINUED | OUTPATIENT
Start: 2021-09-02 | End: 2021-09-03

## 2021-09-02 RX ORDER — SODIUM CHLORIDE 0.9 % (FLUSH) 0.9 %
5-40 SYRINGE (ML) INJECTION EVERY 12 HOURS SCHEDULED
Status: DISCONTINUED | OUTPATIENT
Start: 2021-09-02 | End: 2021-09-08 | Stop reason: HOSPADM

## 2021-09-02 RX ORDER — SODIUM CHLORIDE 0.9 % (FLUSH) 0.9 %
5-40 SYRINGE (ML) INJECTION PRN
Status: DISCONTINUED | OUTPATIENT
Start: 2021-09-02 | End: 2021-09-08 | Stop reason: HOSPADM

## 2021-09-02 RX ORDER — DOCUSATE SODIUM 100 MG/1
100 CAPSULE, LIQUID FILLED ORAL 2 TIMES DAILY PRN
Status: DISCONTINUED | OUTPATIENT
Start: 2021-09-02 | End: 2021-09-02

## 2021-09-02 RX ADMIN — INSULIN LISPRO 6 UNITS: 100 INJECTION, SOLUTION INTRAVENOUS; SUBCUTANEOUS at 12:10

## 2021-09-02 RX ADMIN — CEFEPIME 2000 MG: 2 INJECTION, POWDER, FOR SOLUTION INTRAVENOUS at 20:00

## 2021-09-02 RX ADMIN — SODIUM PHOSPHATE, DIBASIC AND SODIUM PHOSPHATE, MONOBASIC 1 ENEMA: 7; 19 ENEMA RECTAL at 16:26

## 2021-09-02 RX ADMIN — INSULIN LISPRO 2 UNITS: 100 INJECTION, SOLUTION INTRAVENOUS; SUBCUTANEOUS at 16:23

## 2021-09-02 RX ADMIN — INSULIN LISPRO 2 UNITS: 100 INJECTION, SOLUTION INTRAVENOUS; SUBCUTANEOUS at 09:56

## 2021-09-02 RX ADMIN — GUAIFENESIN 600 MG: 600 TABLET, EXTENDED RELEASE ORAL at 21:49

## 2021-09-02 RX ADMIN — METOPROLOL TARTRATE 25 MG: 25 TABLET ORAL at 08:45

## 2021-09-02 RX ADMIN — APIXABAN 5 MG: 5 TABLET, FILM COATED ORAL at 21:49

## 2021-09-02 RX ADMIN — BUDESONIDE AND FORMOTEROL FUMARATE DIHYDRATE 2 PUFF: 160; 4.5 AEROSOL RESPIRATORY (INHALATION) at 20:21

## 2021-09-02 RX ADMIN — ACETAMINOPHEN 650 MG: 325 TABLET ORAL at 01:08

## 2021-09-02 RX ADMIN — FAMOTIDINE 20 MG: 20 TABLET, FILM COATED ORAL at 21:49

## 2021-09-02 RX ADMIN — BUDESONIDE AND FORMOTEROL FUMARATE DIHYDRATE 2 PUFF: 160; 4.5 AEROSOL RESPIRATORY (INHALATION) at 09:22

## 2021-09-02 RX ADMIN — ONDANSETRON 4 MG: 4 TABLET, ORALLY DISINTEGRATING ORAL at 19:59

## 2021-09-02 RX ADMIN — FAMOTIDINE 20 MG: 20 TABLET, FILM COATED ORAL at 08:45

## 2021-09-02 RX ADMIN — BISACODYL 10 MG: 10 SUPPOSITORY RECTAL at 15:05

## 2021-09-02 RX ADMIN — FERROUS SULFATE TAB EC 325 MG (65 MG FE EQUIVALENT) 325 MG: 325 (65 FE) TABLET DELAYED RESPONSE at 21:49

## 2021-09-02 RX ADMIN — SIMETHICONE 80 MG: 80 TABLET, CHEWABLE ORAL at 21:50

## 2021-09-02 RX ADMIN — METOPROLOL TARTRATE 25 MG: 25 TABLET ORAL at 21:50

## 2021-09-02 RX ADMIN — APIXABAN 5 MG: 5 TABLET, FILM COATED ORAL at 08:45

## 2021-09-02 RX ADMIN — CEFEPIME 2000 MG: 2 INJECTION, POWDER, FOR SOLUTION INTRAVENOUS at 15:47

## 2021-09-02 RX ADMIN — VANCOMYCIN HYDROCHLORIDE 1000 MG: 1 INJECTION, SOLUTION INTRAVENOUS at 06:27

## 2021-09-02 RX ADMIN — Medication 1 CAPSULE: at 08:45

## 2021-09-02 RX ADMIN — TAMSULOSIN HYDROCHLORIDE 0.4 MG: 0.4 CAPSULE ORAL at 08:45

## 2021-09-02 RX ADMIN — GUAIFENESIN 600 MG: 600 TABLET, EXTENDED RELEASE ORAL at 08:45

## 2021-09-02 RX ADMIN — SODIUM CHLORIDE, PRESERVATIVE FREE 10 ML: 5 INJECTION INTRAVENOUS at 21:54

## 2021-09-02 RX ADMIN — FERROUS SULFATE TAB EC 325 MG (65 MG FE EQUIVALENT) 325 MG: 325 (65 FE) TABLET DELAYED RESPONSE at 08:45

## 2021-09-02 RX ADMIN — SODIUM CHLORIDE, PRESERVATIVE FREE 10 ML: 5 INJECTION INTRAVENOUS at 21:53

## 2021-09-02 RX ADMIN — POLYETHYLENE GLYCOL 3350 17 G: 17 POWDER, FOR SOLUTION ORAL at 09:56

## 2021-09-02 RX ADMIN — OXYCODONE HYDROCHLORIDE AND ACETAMINOPHEN 1 TABLET: 5; 325 TABLET ORAL at 21:49

## 2021-09-02 RX ADMIN — AMITRIPTYLINE HYDROCHLORIDE 75 MG: 50 TABLET, FILM COATED ORAL at 21:49

## 2021-09-02 RX ADMIN — Medication 1 CAPSULE: at 21:49

## 2021-09-02 ASSESSMENT — PAIN SCALES - GENERAL
PAINLEVEL_OUTOF10: 8

## 2021-09-02 NOTE — PROGRESS NOTES
Patient dislodged PICC line. Resident notified via perfect serve. Awaiting response.   Patient refuses to allow RN to remove existing, non functional PICC line

## 2021-09-02 NOTE — PROGRESS NOTES
[facility] [address]  Occupational Therapy Not Seen Note    DATE: 2021    NAME: Shon Melara  MRN: [id number]   : 1955      Patient not seen this date for Occupational Therapy due to:    Pt declining therapy this date. Pt reports he has been vomiting all morning and is still feeling nauseous.  Pt ed on OOB activity but continued to decline despite encouragement     Next Scheduled Treatment: 9/3/21     Electronically signed by ARABELLA Ryder on 2021 at 9:52 AM

## 2021-09-02 NOTE — ACP (ADVANCE CARE PLANNING)
Patient has PICC line placed. Spoke to Kenia Duran at [facility] she states that they are still waiting for precert.

## 2021-09-02 NOTE — PROGRESS NOTES
Comprehensive Nutrition Assessment    Type and Reason for Visit:  Reassess    Nutrition Recommendations/Plan:   - Modify diet to include Easy to Chew texture per SLP recommendation  - Discontinue high calorie oral nutrition supplement (Ensure Enlive)  - Start clear liquid oral nutrition supplement (Ensure Clear) and provide 3x/d  - Monitor/encourage PO intakes     Nutrition Assessment:  Spoke with patient who reports that his appetite is \"not too good. \" States he has been consistently consuming < 50% of meals. Has not been drinking the Ensure ONS as it upsets his stomach, but is agreeable to trying Ensure Clear ONS. C/o nausea/vomiting - note multiple episodes of emesis overnight. Underwent MBSS on 8/30 - SLP recommended Dental Soft (Easy to Chew) Diet with thin liquids. Blood glucose range x past 24 hours = 140-277 mg/dL. Date of last BM unknown - PRN bowel regimen in place. Malnutrition Assessment:  Malnutrition Status: Moderate malnutrition    Context:  Social/Environmental Circumstances     Findings of the 6 clinical characteristics of malnutrition:  Energy Intake:  Mild decrease in energy intake   Weight Loss:  Unable to assess     Body Fat Loss:  1 - Mild body fat loss- Orbital   Muscle Mass Loss:  1 - Mild muscle mass loss- Temples (temporalis), Clavicles (pectoralis & deltoids)  Fluid Accumulation:  No significant fluid accumulation     Strength:  Not Performed    Estimated Daily Nutrient Needs:  Energy (kcal):  0760-7974 kcal/d (25-27 kcal/kg); Weight Used for Energy Requirements:  Adjusted (84 kg)     Protein (g):  100-110 g protein/d (1.2-1.3 g/kg); Weight Used for Protein Requirements:  Adjusted (84 kg)        Fluid (ml/day):  1878-5286 mL fluid/d or per MD; Method Used for Fluid Requirements:  ml/Kg (25-30 mL)      Nutrition Related Findings:  Labs reviewed. Meds: Culturelle, Ferrous Sulfate, Lantus, Humalog. Date of last BM unknown.       Wounds:  None       Current Nutrition Therapies: ADULT DIET; Regular; 5 carb choices (75 gm/meal)  Adult Oral Nutrition Supplement; Clear Liquid Oral Supplement    Anthropometric Measures:  · Height: 6' 1\" (185.4 cm)  · Current Body Weight: 164 lb 10.9 oz (74.7 kg)   · Admission Body Weight: 144 lb (65.3 kg)    · Usual Body Weight:  Unknown     · Ideal Body Weight: 184 lbs; % Ideal Body Weight 89.5 %   · BMI: 21.7  · Adjusted Body Weight: 184.1; Amputation   · Adjusted BMI: 24.3    · BMI Categories: Normal Weight (BMI 18.5-24. 9)       Nutrition Diagnosis:   · Inadequate oral intake related to altered GI function and poor appetite as evidenced by intake 26-50%, intake 0-25%, poor intake prior to admission, nausea, vomiting    Nutrition Interventions:   Food and/or Nutrient Delivery:  Modify Oral Nutrition Supplement, Modify Current Diet  Nutrition Education/Counseling:  No recommendation at this time   Coordination of Nutrition Care:  Continue to monitor while inpatient    Goals:  Meet greater than 50% of estimated nutrient needs       Nutrition Monitoring and Evaluation:   Behavioral-Environmental Outcomes:  None Identified   Food/Nutrient Intake Outcomes:  Food and Nutrient Intake, Supplement Intake  Physical Signs/Symptoms Outcomes:  Biochemical Data, Constipation, GI Status, Fluid Status or Edema, Nutrition Focused Physical Findings, Skin, Weight, Nausea or Vomiting     Discharge Planning:     Too soon to determine     Electronically signed by Glendy Jeff RD, LD on 9/2/21 at 2:05 PM EDT    Contact: [phone]

## 2021-09-02 NOTE — PROGRESS NOTES
Physical Therapy        Physical Therapy Cancel Note      DATE: 2021    NAME: Rafal Desai  MRN: [id number]   : 1955      Patient not seen this date for Physical Therapy due to:    Patient Declined: Pt declined this date stated he has been vomiting. Therapist will check in PM on pt status.        Electronically signed by Calli Schaefer PTA on 2021 at 9:53 AM

## 2021-09-02 NOTE — PROCEDURES
History/labs/allergies reviewed  Placed by DOUG Ham RN  Assisted by N/A  Consent signed and obtained by physician  Time out performed using two identifiers  Catheter type single  lumen picc  Product type solo2 w 3cg  Lot # [id number]  Expiration date 7/31/22  Catheter size 4  Danish  Trimmed at 41 cm  Total length 41 cm  External catheter length 2 cm  Location R Brachial   Number of attempts 1  Estimated blood loss 2 ml  Pre procedure cardiac Rhythm NSR per bedside telemetry and/or 3CG Tracing. Placement verified by- CXR and/or 3cg Max P wave noted by amplitude changes of the P wave, positive blood return, flushes easily  Special equipment used- ultrasound, micro-introducer technique and 3cg technology if indicated  Catheter secured with statlock  Dressing applied- Tegaderm CHG  Lidocaine administered intradermally conc. 1% 1 mL  PICC line education:   [  Lina Cast Discussed with patient/Family or POA prior to signing Informed Procedural Consent. Risks and Benefits along with reason for procedure were discussed and teaching was reinforced with an education handout on PICC insertion. CDC FAQ Catheter Associated Blood Stream Infections and [address] [address] REV. 7/13 Nursing and Booklet left at bedside or in chart. Patient (Family or POA) acknowledged understanding of information taught and agreed to procedure. [  ] Was not discussed with patient/family or POA due to pts medical status at time of procedure. pts family or POA not available to discuss PICC education.  CDC FAQ Catheter Associated Blood Stream Infections and [address] [address] REV. 7/13 Nursing and Booklet left at bedside or in chart      Chest xray ordered

## 2021-09-02 NOTE — PROGRESS NOTES
[facility]  Internal Medicine Teaching Residency Program  Inpatient Daily Progress Note  ______________________________________________________________________________    Patient: Art Wood  YOB: 1955   VLV:[id number]    Acct: [de-identified]     Room: [facility]0503-76  Admit date: 8/29/2021  Today's date: 09/02/21  Number of days in the hospital: 4    SUBJECTIVE   Admitting Diagnosis: Pneumonia of right lower lobe due to infectious organism  CC: AMS    Pt examined at bedside. Chart & results reviewed. Vancomycin trough 13.2. Patient hemodynamically stable, afebrile. Patient dislodged his picc line this morning, refused RN to remove existing, non functional picc line at this time. WBC within normal limits now. Asked the RN to place the PICC line for IV antibiotics. BP today 155/82, . ROS:  Constitutional:  negative for chills, fevers, sweats  Respiratory:  negative for cough, dyspnea on exertion, hemoptysis, shortness of breath, wheezing  Cardiovascular:  negative for chest pain, chest pressure/discomfort, lower extremity edema, palpitations  Gastrointestinal:  negative for abdominal pain, constipation, diarrhea, nausea, vomiting  Neurological:  negative for dizziness, headache  BRIEF HISTORY        A 65 y.o.  male was brought to the ED from nursing home as he was unresponsive this a.m.   He has PMH of DM (on insulin), COPD, esophageal cancer (s/p esophagectomy), bilateral below-knee amputation, PE (on Eliquis), HLD, MRSA sepsis. He was recently admitted (8/10/2021) for infection of left lower extremity stump. He was discharged on vancomycin and cefepime through PICC line for 6 weeks. On my evaluation, He is saturating well on 4 L NC, /48, HR 59.    He is sleepy, not responding to voice commands but arousable with deep sternal rub.   He c/o dull frontal headache, denies nausea, fever, chest pain, cough, shortness of breath, palpitation, abdominal pain, or bowel/bladder complaints. He goes back to sleep within seconds of waking up. Unable to obtain much history from him.  Will try to obtain more history from him once his mentation improves. Information is also obtained from patient's daughter and EMR. Patient's daughter was concerned about possible unintentional Ativan overdose at the nursing home.  In the ER, on arrival-he was drowsy, with no intelligible speech, withdrawing to pain with no purposeful movement.  Intubation was considered but he woke up while obtaining Covid swab. He is maintaining saturation well on 4 L NC but remains drowsy. Breath sounds reduced and rhonchi appreciated on the right side. Work-up in ED:  EKG-unremarkable  proBNP 390  Troponin 35  Hb 9.6  WBC 22.6,  CMP: Unremarkable except CO2 15, , albumin 2.9  Creat 1.05, BUN 34  ammonia 41  Lactic acid 1.7  VBG-pH 7.2, PO2 59.7, HCO3 17.6  CXR-bilateral interstitial and airspace disease-  asymmetric edema vs multifocal infection vs pneumonitis. CT chest PE-no acute pulmonary embolism, bilateral lower lobe scarring and rounded atelectasis, diffuse tree in bud nodularity throughout right lung-likely infectious inflammatory. CT head-no acute intracranial abnormality  TSH 0.29, thyroxine 0.85  UA unremarkable except trace ketones Hgb and +3 PU    OBJECTIVE     Vital Signs:  BP (!) 137/112   Pulse 99   Temp 97.5 °F (36.4 °C) (Oral)   Resp 21   Ht 6' 1\" (1.854 m)   Wt 164 lb 10.9 oz (74.7 kg)   SpO2 93%   BMI 21.73 kg/m²     Temp (24hrs), Av.9 °F (36.6 °C), Min:97.5 °F (36.4 °C), Max:98 °F (36.7 °C)    No intake/output data recorded. Physical Exam:  Constitutional: This is a well developed, well nourished, 18.5-24.9 - Normal 63y.o. year old male who is alert, oriented, cooperative and in no apparent distress. Head:normocephalic and atraumatic. EENT:  PERRLA. No conjunctival injections.    Septum was midline, mucosa was without erythema, exudates or cobblestoning. No thrush was noted. Neck: Supple without thyromegaly. No elevated JVP. Trachea was midline. Respiratory: Chest was symmetrical without dullness to percussion. Breath sounds bilaterally were clear to auscultation. There were no wheezes, rhonchi or rales. There is no intercostal retraction or use of accessory muscles. No egophony noted. Cardiovascular: Regular without murmur, clicks, gallops or rubs. Abdomen: Slightly rounded and soft without organomegaly. No rebound, rigidity or guarding was appreciated. Lymphatic: No lymphadenopathy. Musculoskeletal: Normal curvature of the spine. No gross muscle weakness. Extremities:  No lower extremity edema, ulcerations, tenderness, varicosities or erythema. Muscle size, tone and strength are normal.  No involuntary movements are noted. Skin:  Warm and dry. Good color, turgor and pigmentation. No lesions or scars.   No cyanosis or clubbing  Neurological/Psychiatric: The patient's general behavior, level of consciousness, thought content and emotional status is normal.        Medications:  Scheduled Medications:    metoprolol tartrate  25 mg Oral BID    guaiFENesin  600 mg Oral BID    vancomycin  1,000 mg IntraVENous Q24H    famotidine  20 mg Oral BID    vancomycin (VANCOCIN) intermittent dosing (placeholder)   Other RX Placeholder    amitriptyline  75 mg Oral Nightly    apixaban  5 mg Oral BID    budesonide-formoterol  2 puff Inhalation BID    ferrous sulfate  325 mg Oral BID    insulin glargine  25 Units SubCUTAneous Dinner    lactobacillus  1 capsule Oral BID    tamsulosin  0.4 mg Oral Daily    sodium chloride flush  5-40 mL IntraVENous 2 times per day    cefepime  2,000 mg IntraVENous Q12H    insulin lispro  0-12 Units SubCUTAneous TID     insulin lispro  0-6 Units SubCUTAneous Nightly     Continuous Infusions:    sodium chloride      dextrose       PRN MedicationsoxyCODONE-acetaminophen, 1 tablet, Q8H PRN  simethicone, 80 mg, Q6H PRN  albuterol sulfate HFA, 2 puff, Q6H PRN  ipratropium-albuterol, 3 mL, Q4H PRN  sodium chloride flush, 5-40 mL, PRN  sodium chloride, 25 mL, PRN  ondansetron, 4 mg, Q8H PRN   Or  ondansetron, 4 mg, Q6H PRN  polyethylene glycol, 17 g, Daily PRN  acetaminophen, 650 mg, Q6H PRN   Or  acetaminophen, 650 mg, Q6H PRN  glucose, 15 g, PRN  dextrose, 12.5 g, PRN  glucagon (rDNA), 1 mg, PRN  dextrose, 100 mL/hr, PRN        Diagnostic Labs:  CBC:   Recent Labs     08/31/21  0037 09/01/21  0503 09/02/21  0504   WBC 12.4* 10.1 9.1   RBC 4.09* 4.30 4.41   HGB 10.6* 11.1* 11.3*   HCT 35.3* 38.0* 37.4*   MCV 86.3 88.4 84.8   RDW 16.8* 16.8* 16.4*    326 369     BMP:   Recent Labs     08/31/21  0732 09/01/21  0804 09/02/21  0504    138 140   K 3.1* 3.4* 4.4   * 106 107   CO2 22 21 23   BUN 13 15 18   CREATININE 0.69* 0.69* 1.05     BNP: No results for input(s): BNP in the last 72 hours. PT/INR: No results for input(s): PROTIME, INR in the last 72 hours. APTT: No results for input(s): APTT in the last 72 hours. CARDIAC ENZYMES: No results for input(s): CKMB, CKMBINDEX, TROPONINI in the last 72 hours. Invalid input(s): CKTOTAL;3  FASTING LIPID PANEL:  Lab Results   Component Value Date    CHOL 174 06/24/2018    HDL 49 06/24/2018    TRIG 175 (H) 06/24/2018     LIVER PROFILE: No results for input(s): AST, ALT, ALB, BILIDIR, BILITOT, ALKPHOS in the last 72 hours. MICROBIOLOGY:   Lab Results   Component Value Date/Time    CULTURE NO GROWTH 3 DAYS 08/29/2021 11:45 AM       Imaging:    CT HEAD WO CONTRAST    Result Date: 8/29/2021  No acute intracranial abnormality. CT ABDOMEN PELVIS W IV CONTRAST Additional Contrast? None    Result Date: 8/29/2021  Bilateral nephrolithiasis. Bilateral renal cysts. Hepatomegaly.      XR CHEST PORTABLE    Result Date: 8/30/2021  Stable right and lower left lung infiltrates, most consistent with infectious/inflammatory etiology in the appropriate clinical setting. XR CHEST PORTABLE    Result Date: 8/29/2021  Bilateral interstitial and airspace disease representing any form of asymmetric edema, multifocal infection, or pneumonitis. CT CHEST PULMONARY EMBOLISM W CONTRAST    Result Date: 8/29/2021  No acute pulmonary artery embolism. Postsurgical changes secondary to esophagectomy and gastric pull-through. Bilateral lower lobe scarring and round atelectasis. New diffuse tree-in-bud nodularity throughout the right upper and right lower lobe with additional right lower lobe peribronchial thickening and increasing subpleural airspace disease and peribronchial thickening all suggesting potential infectious/inflammatory etiology. Recommend short-term interval follow-up evaluation to ensure stability or resolution. FL MODIFIED BARIUM SWALLOW W VIDEO    Result Date: 8/30/2021  No penetration or aspiration with the above administered substances. Please see separate speech pathology report for full discussion of findings and recommendations. ASSESSMENT & PLAN     ASSESSMENT / PLAN:     Principal Problem:    Pneumonia of right lower lobe due to infectious organism  Active Problems:    Type 2 diabetes mellitus with diabetic polyneuropathy, with long-term current use of insulin (HCC)    Esophageal cancer (HCC)    COPD without exacerbation (HCC)    HLD (hyperlipidemia)    PAD (peripheral artery disease) (HCC)    S/P BKA (below knee amputation) bilateral (HCC)    Moderate malnutrition ([address]    History of below knee amputation, right ([address]    History of below knee amputation, left ([address]    Metabolic encephalopathy    Altered mental status  Resolved Problems:    * No resolved hospital problems.  *    Acute alteration in mental status: Resolved  · UA/UDS-unremarkable  · We will monitor electrolytes  · CT head-no acute intracranial abnormality.     Aspiration pneumonia  · History of esophagectomy.    · Reduced breath sounds, rhonchi present on right side  · CXR and CT chest shows bilateral interstitial and airspace disease with new diffuse tree-in-bud nodularity in the right lung-pneumonia picture, possibly aspiration. · Respiratory panel, molecular-remarkable  · On vancomycin 1 g q24 h and cefepime 2 g q12h  · Continue Mucinex  · Spirometry  · He passed barium swallow study.     Diabetes mellitus  · On lantus 25 U  · On medium dose sliding scale     Hypertension  · on Lopresor 25 mg qd po     Hypokalemia - resolved, K 4.4 today  - will monitor BMP    DVT ppx : eliquis  GI ppx: pepcid    PT/OT: on going  Discharge Planning / SW:  on board    Park Mcfarland MD  Internal Medicine Resident, PGY-1  [address], [place]  9/2/2021, 7:35 AM

## 2021-09-02 NOTE — PROGRESS NOTES
Speech Language Pathology  [address] [facility]  Speech Language Pathology    Date: 9/2/2021  Patient Name: Glendy Leone  YOB: 1955   AGE: 63 y.o.   MRN: [id number]        Patient Not Available for Speech Therapy     Due to:  [] Testing   [] Hemodialysis  [] Cancelled by RN  [] Surgery   [] Intubation/Sedation/Pain Medication  [] Medical instability  [x] Other: Pt feeling nauseous     Next scheduled treatment: 9/3/21   Completed by: KARTHIK Burr, M.S. [address] [facility], [place], operated by [facility]

## 2021-09-02 NOTE — PLAN OF CARE
Spoke to patient daughter on 9/2/21 at [address] Shes expressing that she doesn't want father to go to another facility and or SNF/ nursing home. Patients daughter would like to speak to case management and doctor on 9/3/21. Daughters name is Han Coleman and phone number is [phone] she is the patients POA.

## 2021-09-02 NOTE — PLAN OF CARE
Problem: Breathing Pattern - Ineffective:  Goal: Ability to achieve and maintain a regular respiratory rate will improve  Description: Ability to achieve and maintain a regular respiratory rate will improve  Outcome: Ongoing     Problem: Nutrition  Goal: Optimal nutrition therapy  Outcome: Ongoing     Problem: Skin Integrity:  Goal: Will show no infection signs and symptoms  Description: Will show no infection signs and symptoms  Outcome: Ongoing  Goal: Absence of new skin breakdown  Description: Absence of new skin breakdown  Outcome: Ongoing     Problem: Falls - Risk of:  Goal: Will remain free from falls  Description: Will remain free from falls  Outcome: Ongoing  Goal: Absence of physical injury  Description: Absence of physical injury  Outcome: Ongoing

## 2021-09-02 NOTE — PROGRESS NOTES
Pharmacy Vancomycin Consult     Vancomycin Day: 5  Current Dosin mg q24  Current indication: aspiration pneumonia    Temp max:  98 F    Recent Labs     21  0732 21  0503 21  0804 21  0504   BUN   < >  --  15 18   CREATININE   < >  --  0.69* 1.05   WBC  --  10.1  --  9.1    < > = values in this interval not displayed. No intake or output data in the 24 hours ending 21 1008  Culture Date      Source                       Results                       blood                           NGTD                       urine-legionella            negative                       urine                            negative      Ht Readings from Last 1 Encounters:   21 6' 1\" (1.854 m)        Wt Readings from Last 1 Encounters:   21 164 lb 10.9 oz (74.7 kg)       Body mass index is 21.73 kg/m². Estimated Creatinine Clearance: 76 mL/min (based on SCr of 1.05 mg/dL). Trough: 13.4    Assessment/Plan:  Trough is below goal of 15-20 however [employer] predicts  and trough 17 with continued regimen of 1000mg q24. According to bayesian dosing, AUC is within goal 400-600, therefore will keep current regimen and continue to monitor.      Thank you,   Darian Rubio, PharmD 2021 10:16 AM

## 2021-09-02 NOTE — PLAN OF CARE
Nutrition Problem #1: Inadequate oral intake  Intervention: Food and/or Nutrient Delivery: Modify Oral Nutrition Supplement, Modify Current Diet  Nutritional Goals: Meet greater than 50% of estimated nutrient needs

## 2021-09-03 LAB
ABSOLUTE EOS #: 0.42 K/UL (ref 0–0.44)
ABSOLUTE IMMATURE GRANULOCYTE: 0.05 K/UL (ref 0–0.3)
ABSOLUTE LYMPH #: 2.24 K/UL (ref 1.1–3.7)
ABSOLUTE MONO #: 0.94 K/UL (ref 0.1–1.2)
ANION GAP SERPL CALCULATED.3IONS-SCNC: 11 MMOL/L (ref 9–17)
BASOPHILS # BLD: 1 % (ref 0–2)
BASOPHILS ABSOLUTE: 0.1 K/UL (ref 0–0.2)
BUN BLDV-MCNC: 19 MG/DL (ref 8–23)
BUN/CREAT BLD: ABNORMAL (ref 9–20)
CALCIUM SERPL-MCNC: 8.8 MG/DL (ref 8.6–10.4)
CHLORIDE BLD-SCNC: 105 MMOL/L (ref 98–107)
CO2: 22 MMOL/L (ref 20–31)
CREAT SERPL-MCNC: 1.09 MG/DL (ref 0.7–1.2)
DIFFERENTIAL TYPE: ABNORMAL
EOSINOPHILS RELATIVE PERCENT: 5 % (ref 1–4)
GFR AFRICAN AMERICAN: >60 ML/MIN
GFR NON-AFRICAN AMERICAN: >60 ML/MIN
GFR SERPL CREATININE-BSD FRML MDRD: ABNORMAL ML/MIN/{1.73_M2}
GFR SERPL CREATININE-BSD FRML MDRD: ABNORMAL ML/MIN/{1.73_M2}
GLUCOSE BLD-MCNC: 171 MG/DL (ref 75–110)
GLUCOSE BLD-MCNC: 249 MG/DL (ref 75–110)
GLUCOSE BLD-MCNC: 288 MG/DL (ref 75–110)
GLUCOSE BLD-MCNC: 331 MG/DL (ref 70–99)
GLUCOSE BLD-MCNC: 339 MG/DL (ref 75–110)
HCT VFR BLD CALC: 37.7 % (ref 40.7–50.3)
HEMOGLOBIN: 11.3 G/DL (ref 13–17)
IMMATURE GRANULOCYTES: 1 %
LYMPHOCYTES # BLD: 24 % (ref 24–43)
MCH RBC QN AUTO: 25.7 PG (ref 25.2–33.5)
MCHC RBC AUTO-ENTMCNC: 30 G/DL (ref 28.4–34.8)
MCV RBC AUTO: 85.9 FL (ref 82.6–102.9)
MONOCYTES # BLD: 10 % (ref 3–12)
NRBC AUTOMATED: 0 PER 100 WBC
PARTIAL THROMBOPLASTIN TIME: 24.7 SEC (ref 20.5–30.5)
PDW BLD-RTO: 16.6 % (ref 11.8–14.4)
PLATELET # BLD: 351 K/UL (ref 138–453)
PLATELET ESTIMATE: ABNORMAL
PMV BLD AUTO: 10.3 FL (ref 8.1–13.5)
POTASSIUM SERPL-SCNC: 4 MMOL/L (ref 3.7–5.3)
RBC # BLD: 4.39 M/UL (ref 4.21–5.77)
RBC # BLD: ABNORMAL 10*6/UL
SEG NEUTROPHILS: 59 % (ref 36–65)
SEGMENTED NEUTROPHILS ABSOLUTE COUNT: 5.68 K/UL (ref 1.5–8.1)
SODIUM BLD-SCNC: 138 MMOL/L (ref 135–144)
WBC # BLD: 9.4 K/UL (ref 3.5–11.3)
WBC # BLD: ABNORMAL 10*3/UL

## 2021-09-03 PROCEDURE — 2580000003 HC RX 258: Performed by: INTERNAL MEDICINE

## 2021-09-03 PROCEDURE — 6360000002 HC RX W HCPCS: Performed by: INTERNAL MEDICINE

## 2021-09-03 PROCEDURE — 92526 ORAL FUNCTION THERAPY: CPT

## 2021-09-03 PROCEDURE — 2060000000 HC ICU INTERMEDIATE R&B

## 2021-09-03 PROCEDURE — 6360000002 HC RX W HCPCS: Performed by: STUDENT IN AN ORGANIZED HEALTH CARE EDUCATION/TRAINING PROGRAM

## 2021-09-03 PROCEDURE — 6370000000 HC RX 637 (ALT 250 FOR IP)

## 2021-09-03 PROCEDURE — 85730 THROMBOPLASTIN TIME PARTIAL: CPT

## 2021-09-03 PROCEDURE — 85025 COMPLETE CBC W/AUTO DIFF WBC: CPT

## 2021-09-03 PROCEDURE — 6370000000 HC RX 637 (ALT 250 FOR IP): Performed by: STUDENT IN AN ORGANIZED HEALTH CARE EDUCATION/TRAINING PROGRAM

## 2021-09-03 PROCEDURE — 99232 SBSQ HOSP IP/OBS MODERATE 35: CPT | Performed by: INTERNAL MEDICINE

## 2021-09-03 PROCEDURE — 36415 COLL VENOUS BLD VENIPUNCTURE: CPT

## 2021-09-03 PROCEDURE — 2580000003 HC RX 258: Performed by: STUDENT IN AN ORGANIZED HEALTH CARE EDUCATION/TRAINING PROGRAM

## 2021-09-03 PROCEDURE — 80048 BASIC METABOLIC PNL TOTAL CA: CPT

## 2021-09-03 PROCEDURE — 82947 ASSAY GLUCOSE BLOOD QUANT: CPT

## 2021-09-03 RX ORDER — CALCIUM CARBONATE 200(500)MG
500 TABLET,CHEWABLE ORAL 2 TIMES DAILY PRN
Status: DISCONTINUED | OUTPATIENT
Start: 2021-09-03 | End: 2021-09-08 | Stop reason: HOSPADM

## 2021-09-03 RX ADMIN — FAMOTIDINE 20 MG: 20 TABLET, FILM COATED ORAL at 21:23

## 2021-09-03 RX ADMIN — APIXABAN 5 MG: 5 TABLET, FILM COATED ORAL at 21:23

## 2021-09-03 RX ADMIN — FAMOTIDINE 20 MG: 20 TABLET, FILM COATED ORAL at 08:45

## 2021-09-03 RX ADMIN — GUAIFENESIN 600 MG: 600 TABLET, EXTENDED RELEASE ORAL at 21:22

## 2021-09-03 RX ADMIN — AMITRIPTYLINE HYDROCHLORIDE 75 MG: 50 TABLET, FILM COATED ORAL at 21:23

## 2021-09-03 RX ADMIN — INSULIN LISPRO 4 UNITS: 100 INJECTION, SOLUTION INTRAVENOUS; SUBCUTANEOUS at 21:24

## 2021-09-03 RX ADMIN — APIXABAN 5 MG: 5 TABLET, FILM COATED ORAL at 08:45

## 2021-09-03 RX ADMIN — GUAIFENESIN 600 MG: 600 TABLET, EXTENDED RELEASE ORAL at 08:45

## 2021-09-03 RX ADMIN — NALOXEGOL OXALATE 25 MG: 12.5 TABLET, FILM COATED ORAL at 16:04

## 2021-09-03 RX ADMIN — METOPROLOL TARTRATE 25 MG: 25 TABLET ORAL at 08:45

## 2021-09-03 RX ADMIN — CEFEPIME 2000 MG: 2 INJECTION, POWDER, FOR SOLUTION INTRAVENOUS at 21:35

## 2021-09-03 RX ADMIN — Medication 1 CAPSULE: at 08:45

## 2021-09-03 RX ADMIN — CEFEPIME 2000 MG: 2 INJECTION, POWDER, FOR SOLUTION INTRAVENOUS at 06:45

## 2021-09-03 RX ADMIN — OXYCODONE HYDROCHLORIDE AND ACETAMINOPHEN 1 TABLET: 5; 325 TABLET ORAL at 20:24

## 2021-09-03 RX ADMIN — INSULIN GLARGINE 25 UNITS: 100 INJECTION, SOLUTION SUBCUTANEOUS at 16:54

## 2021-09-03 RX ADMIN — Medication 1 CAPSULE: at 21:24

## 2021-09-03 RX ADMIN — FERROUS SULFATE TAB EC 325 MG (65 MG FE EQUIVALENT) 325 MG: 325 (65 FE) TABLET DELAYED RESPONSE at 21:22

## 2021-09-03 RX ADMIN — SODIUM CHLORIDE, PRESERVATIVE FREE 10 ML: 5 INJECTION INTRAVENOUS at 22:48

## 2021-09-03 RX ADMIN — INSULIN LISPRO 4 UNITS: 100 INJECTION, SOLUTION INTRAVENOUS; SUBCUTANEOUS at 12:02

## 2021-09-03 RX ADMIN — SIMETHICONE 80 MG: 80 TABLET, CHEWABLE ORAL at 08:45

## 2021-09-03 RX ADMIN — INSULIN LISPRO 2 UNITS: 100 INJECTION, SOLUTION INTRAVENOUS; SUBCUTANEOUS at 16:45

## 2021-09-03 RX ADMIN — METOPROLOL TARTRATE 25 MG: 25 TABLET ORAL at 21:23

## 2021-09-03 RX ADMIN — ONDANSETRON 4 MG: 4 TABLET, ORALLY DISINTEGRATING ORAL at 20:24

## 2021-09-03 RX ADMIN — INSULIN LISPRO 4 UNITS: 100 INJECTION, SOLUTION INTRAVENOUS; SUBCUTANEOUS at 08:47

## 2021-09-03 RX ADMIN — FERROUS SULFATE TAB EC 325 MG (65 MG FE EQUIVALENT) 325 MG: 325 (65 FE) TABLET DELAYED RESPONSE at 08:45

## 2021-09-03 RX ADMIN — VANCOMYCIN HYDROCHLORIDE 1000 MG: 1 INJECTION, SOLUTION INTRAVENOUS at 05:22

## 2021-09-03 RX ADMIN — TAMSULOSIN HYDROCHLORIDE 0.4 MG: 0.4 CAPSULE ORAL at 08:45

## 2021-09-03 RX ADMIN — SODIUM CHLORIDE, PRESERVATIVE FREE 10 ML: 5 INJECTION INTRAVENOUS at 08:46

## 2021-09-03 RX ADMIN — ONDANSETRON 4 MG: 2 INJECTION INTRAMUSCULAR; INTRAVENOUS at 14:48

## 2021-09-03 RX ADMIN — CALCIUM CARBONATE 500 MG: 500 TABLET, CHEWABLE ORAL at 11:47

## 2021-09-03 ASSESSMENT — PAIN SCALES - GENERAL
PAINLEVEL_OUTOF10: 0
PAINLEVEL_OUTOF10: 8
PAINLEVEL_OUTOF10: 0

## 2021-09-03 NOTE — PROGRESS NOTES
[facility]  Internal Medicine Teaching Residency Program  Inpatient Daily Progress Note  ______________________________________________________________________________    Patient: Sofia Search  YOB: 1955   DSM:[id number]    Acct: [de-identified]     Room: [facility]-  Admit date: 8/29/2021  Today's date: 09/03/21  Number of days in the hospital: 5    SUBJECTIVE   Admitting Diagnosis: Pneumonia of right lower lobe due to infectious organism  CC: AMS  Pt examined at bedside. Chart & results reviewed. Patient's daughter want to change SNF facility,  to call daughter. Patient didn't have bowel movement in 5 days, abdomen soft, bowel sounds present in 2 quadrants. ROS:  Constitutional:  negative for chills, fevers, sweats  Respiratory:  negative for cough, dyspnea on exertion, hemoptysis, shortness of breath, wheezing  Cardiovascular:  negative for chest pain, chest pressure/discomfort, lower extremity edema, palpitations  Gastrointestinal:  negative for abdominal pain, constipation, diarrhea, nausea, vomiting  Neurological:  negative for dizziness, headache  BRIEF HISTORY        A 65 y.o.  male was brought to the ED from nursing home as he was unresponsive this a.m.   He has PMH of DM (on insulin), COPD, esophageal cancer (s/p esophagectomy), bilateral below-knee amputation, PE (on Eliquis), HLD, MRSA sepsis. He was recently admitted (8/10/2021) for infection of left lower extremity stump. He was discharged on vancomycin and cefepime through PICC line for 6 weeks. On my evaluation, He is saturating well on 4 L NC, /48, HR 59. He is sleepy, not responding to voice commands but arousable with deep sternal rub.   He c/o dull frontal headache, denies nausea, fever, chest pain, cough, shortness of breath, palpitation, abdominal pain, or bowel/bladder complaints.   He goes back to sleep within seconds of waking up. Unable to obtain much history from him.  Will try to obtain more history from him once his mentation improves. Information is also obtained from patient's daughter and EMR. Patient's daughter was concerned about possible unintentional Ativan overdose at the nursing home.  In the ER, on arrival-he was drowsy, with no intelligible speech, withdrawing to pain with no purposeful movement.  Intubation was considered but he woke up while obtaining Covid swab. He is maintaining saturation well on 4 L NC but remains drowsy. Breath sounds reduced and rhonchi appreciated on the right side. Work-up in ED:  EKG-unremarkable  proBNP 390  Troponin 35  Hb 9.6  WBC 22.6,  CMP: Unremarkable except CO2 15, , albumin 2.9  Creat 1.05, BUN 34  ammonia 41  Lactic acid 1.7  VBG-pH 7.2, PO2 59.7, HCO3 17.6  CXR-bilateral interstitial and airspace disease-  asymmetric edema vs multifocal infection vs pneumonitis. CT chest PE-no acute pulmonary embolism, bilateral lower lobe scarring and rounded atelectasis, diffuse tree in bud nodularity throughout right lung-likely infectious inflammatory. CT head-no acute intracranial abnormality  TSH 0.29, thyroxine 0.85  UA unremarkable except trace ketones Hgb and +3 PU    OBJECTIVE     Vital Signs:  /65   Pulse 69   Temp 97.8 °F (36.6 °C) (Oral)   Resp 18   Ht 6' 1\" (1.854 m)   Wt 164 lb 10.9 oz (74.7 kg)   SpO2 91%   BMI 21.73 kg/m²     Temp (24hrs), Av.8 °F (36.6 °C), Min:97.5 °F (36.4 °C), Max:98.3 °F (36.8 °C)    In: 500   Out: 100 [Urine:100]    Physical Exam:  Constitutional: This is a well developed, well nourished, 18.5-24.9 - Normal 63y.o. year old male who is alert, oriented, cooperative and in no apparent distress. Head:normocephalic and atraumatic. EENT:  PERRLA. No conjunctival injections. Septum was midline, mucosa was without erythema, exudates or cobblestoning. No thrush was noted. Neck: Supple without thyromegaly. No elevated JVP.  Trachea was midline. Respiratory: Chest was symmetrical without dullness to percussion. Breath sounds bilaterally were clear to auscultation. There were no wheezes, rhonchi or rales. There is no intercostal retraction or use of accessory muscles. No egophony noted. Cardiovascular: Regular without murmur, clicks, gallops or rubs. Abdomen: Slightly rounded and soft without organomegaly. No rebound, rigidity or guarding was appreciated. Lymphatic: No lymphadenopathy. Musculoskeletal: Normal curvature of the spine. No gross muscle weakness. Extremities:  No lower extremity edema, ulcerations, tenderness, varicosities or erythema. Muscle size, tone and strength are normal.  No involuntary movements are noted. Skin:  Warm and dry. Good color, turgor and pigmentation. No lesions or scars.   No cyanosis or clubbing  Neurological/Psychiatric: The patient's general behavior, level of consciousness, thought content and emotional status is normal.        Medications:  Scheduled Medications:    naloxegol  25 mg Oral QAM    sodium chloride flush  5-40 mL IntraVENous 2 times per day    metoprolol tartrate  25 mg Oral BID    guaiFENesin  600 mg Oral BID    vancomycin  1,000 mg IntraVENous Q24H    famotidine  20 mg Oral BID    vancomycin (VANCOCIN) intermittent dosing (placeholder)   Other RX Placeholder    amitriptyline  75 mg Oral Nightly    apixaban  5 mg Oral BID    budesonide-formoterol  2 puff Inhalation BID    ferrous sulfate  325 mg Oral BID    insulin glargine  25 Units SubCUTAneous Dinner    lactobacillus  1 capsule Oral BID    tamsulosin  0.4 mg Oral Daily    sodium chloride flush  5-40 mL IntraVENous 2 times per day    cefepime  2,000 mg IntraVENous Q12H    insulin lispro  0-12 Units SubCUTAneous TID     insulin lispro  0-6 Units SubCUTAneous Nightly     Continuous Infusions:    sodium chloride      sodium chloride      dextrose       PRN Medicationscalcium carbonate, 500 mg, BID PRN  sodium chloride flush, 5-40 mL, PRN  sodium chloride, 25 mL, PRN  oxyCODONE-acetaminophen, 1 tablet, Q8H PRN  simethicone, 80 mg, Q6H PRN  albuterol sulfate HFA, 2 puff, Q6H PRN  ipratropium-albuterol, 3 mL, Q4H PRN  sodium chloride flush, 5-40 mL, PRN  sodium chloride, 25 mL, PRN  ondansetron, 4 mg, Q8H PRN   Or  ondansetron, 4 mg, Q6H PRN  acetaminophen, 650 mg, Q6H PRN   Or  acetaminophen, 650 mg, Q6H PRN  glucose, 15 g, PRN  dextrose, 12.5 g, PRN  glucagon (rDNA), 1 mg, PRN  dextrose, 100 mL/hr, PRN        Diagnostic Labs:  CBC:   Recent Labs     09/01/21  0503 09/02/21  0504 09/03/21  0519   WBC 10.1 9.1 9.4   RBC 4.30 4.41 4.39   HGB 11.1* 11.3* 11.3*   HCT 38.0* 37.4* 37.7*   MCV 88.4 84.8 85.9   RDW 16.8* 16.4* 16.6*    369 351     BMP:   Recent Labs     09/01/21  0804 09/02/21  0504 09/03/21  0519    140 138   K 3.4* 4.4 4.0    107 105   CO2 21 23 22   BUN 15 18 19   CREATININE 0.69* 1.05 1.09     BNP: No results for input(s): BNP in the last 72 hours. PT/INR: No results for input(s): PROTIME, INR in the last 72 hours. APTT: No results for input(s): APTT in the last 72 hours. CARDIAC ENZYMES: No results for input(s): CKMB, CKMBINDEX, TROPONINI in the last 72 hours. Invalid input(s): CKTOTAL;3  FASTING LIPID PANEL:  Lab Results   Component Value Date    CHOL 174 06/24/2018    HDL 49 06/24/2018    TRIG 175 (H) 06/24/2018     LIVER PROFILE: No results for input(s): AST, ALT, ALB, BILIDIR, BILITOT, ALKPHOS in the last 72 hours. MICROBIOLOGY:   Lab Results   Component Value Date/Time    CULTURE NO GROWTH 5 DAYS 08/29/2021 11:45 AM       Imaging:    CT HEAD WO CONTRAST    Result Date: 8/29/2021  No acute intracranial abnormality. CT ABDOMEN PELVIS W IV CONTRAST Additional Contrast? None    Result Date: 8/29/2021  Bilateral nephrolithiasis. Bilateral renal cysts. Hepatomegaly.      XR CHEST PORTABLE    Result Date: 9/2/2021  New right PICC line terminates at the superior caval atrial junction. XR CHEST PORTABLE    Result Date: 9/2/2021  1. Position of right PICC line remains unchanged with the tip at the level of the right subclavian vein. 2.  Stable exam of the chest.     XR CHEST PORTABLE    Result Date: 8/30/2021  Stable right and lower left lung infiltrates, most consistent with infectious/inflammatory etiology in the appropriate clinical setting. XR CHEST PORTABLE    Result Date: 8/29/2021  Bilateral interstitial and airspace disease representing any form of asymmetric edema, multifocal infection, or pneumonitis. CT CHEST PULMONARY EMBOLISM W CONTRAST    Result Date: 8/29/2021  No acute pulmonary artery embolism. Postsurgical changes secondary to esophagectomy and gastric pull-through. Bilateral lower lobe scarring and round atelectasis. New diffuse tree-in-bud nodularity throughout the right upper and right lower lobe with additional right lower lobe peribronchial thickening and increasing subpleural airspace disease and peribronchial thickening all suggesting potential infectious/inflammatory etiology. Recommend short-term interval follow-up evaluation to ensure stability or resolution. FL MODIFIED BARIUM SWALLOW W VIDEO    Result Date: 8/30/2021  No penetration or aspiration with the above administered substances. Please see separate speech pathology report for full discussion of findings and recommendations. ASSESSMENT & PLAN     ASSESSMENT / PLAN:     Acute alteration in mental status: Resolved  · UA/UDS-unremarkable  · We will monitor electrolytes  · CT head-no acute intracranial abnormality.     Aspiration pneumonia  · History of esophagectomy.    · Reduced breath sounds, rhonchi present on right side  · CXR and CT chest shows bilateral interstitial and airspace disease with new diffuse tree-in-bud nodularity in the right lung-pneumonia picture, possibly aspiration.   · Respiratory panel, molecular-remarkable  · On vancomycin 1 g q24 h and cefepime 2 g q12h  · Continue Mucinex  · Spirometry  · He passed barium swallow study.     Diabetes mellitus  · On lantus 25 U  · On medium dose sliding scale     Hypertension  · on Lopresor 25 mg qd po     Hypokalemia - resolved, K 4.4 today  - will monitor BMP    Constipation  - started on naloxegol 25 mg qd    DVT ppx : eliquis  GI ppx: famotidine    PT/OT: pt/ot on board  Discharge Planning / SW:  needs to talk to daughter, daughter is POA, wants to change SNF of patient    Frieda Zaman MD  Internal Medicine Resident, PGY-1  [address], [place]  9/3/2021, 1:15 PM

## 2021-09-03 NOTE — PROGRESS NOTES
Occupational [employer] [employer]  Occupational Therapy Not Seen Note    DATE: 9/3/2021    NAME: Chuckie Schreiber  MRN: [id number]   : 1955      Patient not seen this date for Occupational Therapy due to:    Patient Declined: politely declined, mild nausea.         Electronically signed by ARABELLA West on 9/3/2021 at 3:30 PM

## 2021-09-03 NOTE — PROGRESS NOTES
Speech Language Pathology  Speech Language Pathology  [facility]    Dysphagia Treatment Note    Date: 9/3/2021  Patients Name: Regan Brower  MRN: [id number]  Diagnosis:   Patient Active Problem List   Diagnosis Code    Type 2 diabetes mellitus with diabetic polyneuropathy, with long-term current use of insulin ([address] E11.42, Z79.4    Neuropathic pain, leg M79.2    Diabetic polyneuropathy ([address] E11.42    Allergic rhinitis J30.9    Tobacco dependence F17.200    Edentulous K08.109    Dysphagia R13.10    Lung nodules R91.8    Esophageal cancer ([address] C15.9    Fracture of humerus, left, closed S42.302A    Closed fracture of humerus S42.309A    DM type 2 with diabetic peripheral neuropathy (HCC) E11.42    COPD without exacerbation ([facility]) J44.9    HLD (hyperlipidemia) E78.5    Gastroesophageal reflux disease K21.9    Chronic pain syndrome G89.4    Marijuana use F12.90    History of colon polyps Z86.010    Functional diarrhea K59.1    Sepsis due to methicillin resistant Staphylococcus aureus (MRSA) ([facility]) A41.02    History of esophageal cancer Z85.01    Osteomyelitis, chronic, ankle or foot ([address] M86.679    PAD (peripheral artery disease) ([facility]) I73.9    Other pulmonary embolism without acute cor pulmonale ([facility]) I26.99    Carotid stenosis, asymptomatic, bilateral I65.23    Lower limb amputation status Z89.619    Fx humeral neck, right, closed, initial encounter S42.211A    Transient hypotension I95.9    Constipation due to opioid therapy K59.03, T40.2X5A    Acute kidney injury ([address] H86.1    Complication of below knee amputation stump ([facility]) T87.9    COPD exacerbation ([facility]) J44.1    Hyponatremia E87.1    Pain, phantom limb ([facility]) G54.6    S/P BKA (below knee amputation) bilateral ([facility]) Z89.512, Z89.511    Moderate protein malnutrition ([facility]) E44.0    Essential hypertension I10    Uncontrolled type 2 diabetes mellitus with hyperglycemia ([address] E11.65    History of pulmonary embolism - 2017 Z86. 80    Atelectasis J98.11    Uncontrolled type 2 diabetes mellitus with foot ulcer ([facility]) E11.621, L97.509, E11.65    Azotemia R79.89    Anemia, normocytic normochromic D64.9    Drug-induced constipation K59.03    Osteomyelitis of metatarsals of left foot ([facility]) M86.9    Diabetic foot infection ([facility]) E11.628, L08.9    Noncompliance Z91.19    Type 1 diabetes mellitus with diabetic foot infection ([facility] E10.628, L08.9    Acute osteomyelitis of left foot ([facility]) M86.172    Cellulitis of left foot L03. 116    Mild malnutrition ([facility]) E44.1    Hypocalcemia E83.51    Hypokalemia E87.6    Moderate malnutrition ([facility]) E44.0    History of below knee amputation, right ([facility] Z89.511    Foot ulcer with fat layer exposed, left ([facility]) L97.522    Chronic refractory osteomyelitis of left foot ([facility]) M86.672    Sepsis ([facility]) A41.9    Pyogenic inflammation of bone ([facility]) M86.9    Cellulitis of left lower extremity at BKA stump L03. 116    History of below knee amputation, left ([facility]) Z89.512    Leg ulcer, left, with fat layer exposed [facility] L97.922    S/P amputation Z89.9    Tobacco abuse Z72.0    Pneumonia of right lower lobe due to infectious organism J18.9    Abdominal pain R10.9    Marijuana abuse F12.10    Parapneumonic effusion J18.9, J91.8    Hiatus hernia -large W94.0    Metabolic encephalopathy F70.32    Altered mental status R41.82       Pain: 0/10    Dysphagia Treatment  Treatment time: 0999-8611    Subjective: [x] Alert [x] Cooperative     [] Confused     [] Agitated    [] Lethargic    Objective/Assessment:    Pt. Seen for swallow check. Pt. Observed with x2 trials thin liquid by straw, x3 trials soft solid. Prolonged but functional mastication, no overt s/s of aspiration with either consistency. Pt reports no concerns regarding diet at this time. Recommend continue Easy to Chew with thin liquids.  Recommend small sips and bites, double swallow, only feed when alert and awake and upright at 90 degrees for all PO intake. Recommend close monitoring for overt/clinical s/s of aspiration and D/C PO intake and complete Modified Barium Swallow Study should they occur. Plan:  [] Continue ST services    [x] Discharge from :        Discharge recommendations: [] Inpatient Rehab   [] [place]   [] Outpatient Therapy  [] Follow up at trauma clinic   [x] Other: No therapy recommended at discharge.            Treatment completed by: Sayda Alexandra, SLP, M.S. [id number] [facility]

## 2021-09-03 NOTE — CARE COORDINATION
Transition plan:    Received call from BODØ at [facility]. She informed me that they have approval and bed available. Informed her that patient had neg COVID test yesterday and that I will call her back after I arrange transportation with a time for discharge. 16:40 Spoke with patient and informed him we have not heard back from his daughter. Discussed transport with patient to [facility]. Patient voiced wanting to go home. CM explained need for IV ABX at facility since he does not have help at home. Patient agreeable after long discussion. 16:42  Called [facility] and spoke with Radha Cruz. Pick-up arranged for 8:30 pm tonight. Informed patient and nurse Nick Ward of transport time. 16:53  Informed BODØ at [facility] of discharge time. 17:00 AVS and neg COVID result faxed to [facility] at [address] [facility] [phone]. Report and fax # for facility provided to HA Ward. 18:14 Met with patient at bedside. Darrell Yousif and Juwan BONNER house supervisor in attendance. Patient indicates to us that he wants to go home and denied that he was agreeable to go to  [facility]. Sayra Garcia discussed the appeal process with patient. Patient asking her to give the information to her daughter who has POA. 18:58 Spoke with Sindhu Hicks at [facility] to inform her that patient is appealing discharge and he will not be coming.

## 2021-09-03 NOTE — CARE COORDINATION
Called patients daughter per physician request, left voicemail. 46 Called daughter again regarding discharge. I was informed that she did not want her father to return to facility, and that she was the POA. Patient is A&O and choose that facility. CM moving forward on transport to [address] daughter notified of discharge and is irate that no one has spoken to her about his transition plan. I explained to her that case mangers have had multiple conversations about his transition plan and he stated that he did not feel he could return home on IV abx and had given choices for SNF. We had followed that plan per his request.  She again states that she was never notified of these decision as she is th POA. I explained that he is alert and oriented that he decision making only comes into play if he is unable. She started yelling at me she no longer was listening to anything I had to say . I told her that I would not be talked to in that way, that I wild only have a conversation with the patient. [address] Shelley Valencia  is at [facility] ,he informs me that the daughter Merlinda Crome has called and has again stated that he will not be discharged to a SNF. Both Shelley Valencia and Jayy NEGRO from case management along with Jayleen Haney and myself went to discuss options with patient. We discussed Appeal or SNF. He states to call his daughter to to have her appeal the discharge. 1 called Janett CM manager to discuss issue with her. She informs me that the patient has 2 options appeal or admit to SNF.      [address] [facility] and informed her of the appeal process and supplied with number. She again tells me that I have no idea what problems her father has had with faciilties and that I have mad a huge mess and I am leaving her to clean it up. She keeps telling me that her dad doesn't remember saying that he has agreed to any of that, and he would have never agreed to go to a facility.   I again gave her the option of Appeal or SNF. She states that she will appeal and will be filling a formal complaint against me. Awaiting to see if patient's daughter appeals.

## 2021-09-03 NOTE — PROGRESS NOTES
Physical Therapy        Physical Therapy Cancel Note      DATE: 9/3/2021    NAME: Dakota Lee  MRN: [id number]   : 1955      Patient not seen this date for Physical Therapy due to:    Pt declined Physical Therapy, will check back if time permits      Electronically signed by Prabhakar Sales PTA on 9/3/2021 at 4:21 PM

## 2021-09-03 NOTE — PLAN OF CARE
Problem: Breathing Pattern - Ineffective:  Goal: Ability to achieve and maintain a regular respiratory rate will improve  Description: Ability to achieve and maintain a regular respiratory rate will improve  Outcome: Ongoing     Problem: Nutrition  Goal: Optimal nutrition therapy  Outcome: Ongoing     Problem: Skin Integrity:  Goal: Will show no infection signs and symptoms  Description: Will show no infection signs and symptoms  Outcome: Ongoing  Goal: Absence of new skin breakdown  Description: Absence of new skin breakdown  Outcome: Ongoing     Problem: Falls - Risk of:  Goal: Will remain free from falls  Description: Will remain free from falls  Outcome: Ongoing  Goal: Absence of physical injury  Description: Absence of physical injury  Outcome: Ongoing     Problem: Pain:  Goal: Pain level will decrease  Description: Pain level will decrease  Outcome: Ongoing  Goal: Control of acute pain  Description: Control of acute pain  Outcome: Ongoing  Goal: Control of chronic pain  Description: Control of chronic pain  Outcome: Ongoing

## 2021-09-04 ENCOUNTER — APPOINTMENT (OUTPATIENT)
Dept: GENERAL RADIOLOGY | Age: 66
DRG: 177 | End: 2021-09-04
Payer: MEDICARE

## 2021-09-04 LAB
ABSOLUTE EOS #: 0.47 K/UL (ref 0–0.44)
ABSOLUTE IMMATURE GRANULOCYTE: 0.06 K/UL (ref 0–0.3)
ABSOLUTE LYMPH #: 2.31 K/UL (ref 1.1–3.7)
ABSOLUTE MONO #: 0.99 K/UL (ref 0.1–1.2)
ANION GAP SERPL CALCULATED.3IONS-SCNC: 10 MMOL/L (ref 9–17)
BASOPHILS # BLD: 1 % (ref 0–2)
BASOPHILS ABSOLUTE: 0.14 K/UL (ref 0–0.2)
BUN BLDV-MCNC: 19 MG/DL (ref 8–23)
BUN/CREAT BLD: ABNORMAL (ref 9–20)
CALCIUM SERPL-MCNC: 8.9 MG/DL (ref 8.6–10.4)
CHLORIDE BLD-SCNC: 104 MMOL/L (ref 98–107)
CO2: 22 MMOL/L (ref 20–31)
CREAT SERPL-MCNC: 0.93 MG/DL (ref 0.7–1.2)
CULTURE: NORMAL
CULTURE: NORMAL
DIFFERENTIAL TYPE: ABNORMAL
EOSINOPHILS RELATIVE PERCENT: 5 % (ref 1–4)
GFR AFRICAN AMERICAN: >60 ML/MIN
GFR NON-AFRICAN AMERICAN: >60 ML/MIN
GFR SERPL CREATININE-BSD FRML MDRD: ABNORMAL ML/MIN/{1.73_M2}
GFR SERPL CREATININE-BSD FRML MDRD: ABNORMAL ML/MIN/{1.73_M2}
GLUCOSE BLD-MCNC: 136 MG/DL (ref 75–110)
GLUCOSE BLD-MCNC: 211 MG/DL (ref 75–110)
GLUCOSE BLD-MCNC: 215 MG/DL (ref 75–110)
GLUCOSE BLD-MCNC: 218 MG/DL (ref 70–99)
GLUCOSE BLD-MCNC: 241 MG/DL (ref 75–110)
GLUCOSE BLD-MCNC: 279 MG/DL (ref 75–110)
HCT VFR BLD CALC: 36.6 % (ref 40.7–50.3)
HEMOGLOBIN: 11.2 G/DL (ref 13–17)
IMMATURE GRANULOCYTES: 1 %
LYMPHOCYTES # BLD: 24 % (ref 24–43)
Lab: NORMAL
Lab: NORMAL
MCH RBC QN AUTO: 26.5 PG (ref 25.2–33.5)
MCHC RBC AUTO-ENTMCNC: 30.6 G/DL (ref 28.4–34.8)
MCV RBC AUTO: 86.5 FL (ref 82.6–102.9)
MONOCYTES # BLD: 10 % (ref 3–12)
NRBC AUTOMATED: 0 PER 100 WBC
PDW BLD-RTO: 16.5 % (ref 11.8–14.4)
PLATELET # BLD: 353 K/UL (ref 138–453)
PLATELET ESTIMATE: ABNORMAL
PMV BLD AUTO: 9.7 FL (ref 8.1–13.5)
POTASSIUM SERPL-SCNC: 3.9 MMOL/L (ref 3.7–5.3)
RBC # BLD: 4.23 M/UL (ref 4.21–5.77)
RBC # BLD: ABNORMAL 10*6/UL
SEG NEUTROPHILS: 59 % (ref 36–65)
SEGMENTED NEUTROPHILS ABSOLUTE COUNT: 5.8 K/UL (ref 1.5–8.1)
SODIUM BLD-SCNC: 136 MMOL/L (ref 135–144)
SPECIMEN DESCRIPTION: NORMAL
SPECIMEN DESCRIPTION: NORMAL
WBC # BLD: 9.8 K/UL (ref 3.5–11.3)
WBC # BLD: ABNORMAL 10*3/UL

## 2021-09-04 PROCEDURE — 82947 ASSAY GLUCOSE BLOOD QUANT: CPT

## 2021-09-04 PROCEDURE — 6370000000 HC RX 637 (ALT 250 FOR IP): Performed by: STUDENT IN AN ORGANIZED HEALTH CARE EDUCATION/TRAINING PROGRAM

## 2021-09-04 PROCEDURE — 94640 AIRWAY INHALATION TREATMENT: CPT

## 2021-09-04 PROCEDURE — 2060000000 HC ICU INTERMEDIATE R&B

## 2021-09-04 PROCEDURE — 85025 COMPLETE CBC W/AUTO DIFF WBC: CPT

## 2021-09-04 PROCEDURE — 94664 DEMO&/EVAL PT USE INHALER: CPT

## 2021-09-04 PROCEDURE — 6360000002 HC RX W HCPCS: Performed by: STUDENT IN AN ORGANIZED HEALTH CARE EDUCATION/TRAINING PROGRAM

## 2021-09-04 PROCEDURE — 80048 BASIC METABOLIC PNL TOTAL CA: CPT

## 2021-09-04 PROCEDURE — 99232 SBSQ HOSP IP/OBS MODERATE 35: CPT | Performed by: INTERNAL MEDICINE

## 2021-09-04 PROCEDURE — 36415 COLL VENOUS BLD VENIPUNCTURE: CPT

## 2021-09-04 PROCEDURE — 6370000000 HC RX 637 (ALT 250 FOR IP)

## 2021-09-04 PROCEDURE — 2580000003 HC RX 258: Performed by: INTERNAL MEDICINE

## 2021-09-04 PROCEDURE — 2580000003 HC RX 258: Performed by: STUDENT IN AN ORGANIZED HEALTH CARE EDUCATION/TRAINING PROGRAM

## 2021-09-04 PROCEDURE — 74018 RADEX ABDOMEN 1 VIEW: CPT

## 2021-09-04 PROCEDURE — 6360000002 HC RX W HCPCS: Performed by: INTERNAL MEDICINE

## 2021-09-04 RX ORDER — INSULIN GLARGINE 100 [IU]/ML
35 INJECTION, SOLUTION SUBCUTANEOUS
Status: DISCONTINUED | OUTPATIENT
Start: 2021-09-04 | End: 2021-09-04

## 2021-09-04 RX ORDER — INSULIN GLARGINE 100 [IU]/ML
30 INJECTION, SOLUTION SUBCUTANEOUS
Status: DISCONTINUED | OUTPATIENT
Start: 2021-09-04 | End: 2021-09-08 | Stop reason: HOSPADM

## 2021-09-04 RX ADMIN — CEFEPIME 2000 MG: 2 INJECTION, POWDER, FOR SOLUTION INTRAVENOUS at 21:22

## 2021-09-04 RX ADMIN — INSULIN LISPRO 6 UNITS: 100 INJECTION, SOLUTION INTRAVENOUS; SUBCUTANEOUS at 13:04

## 2021-09-04 RX ADMIN — APIXABAN 5 MG: 5 TABLET, FILM COATED ORAL at 09:49

## 2021-09-04 RX ADMIN — AMITRIPTYLINE HYDROCHLORIDE 75 MG: 50 TABLET, FILM COATED ORAL at 21:21

## 2021-09-04 RX ADMIN — INSULIN LISPRO 2 UNITS: 100 INJECTION, SOLUTION INTRAVENOUS; SUBCUTANEOUS at 21:21

## 2021-09-04 RX ADMIN — TAMSULOSIN HYDROCHLORIDE 0.4 MG: 0.4 CAPSULE ORAL at 09:48

## 2021-09-04 RX ADMIN — NALOXEGOL OXALATE 25 MG: 12.5 TABLET, FILM COATED ORAL at 09:48

## 2021-09-04 RX ADMIN — METOPROLOL TARTRATE 25 MG: 25 TABLET ORAL at 21:21

## 2021-09-04 RX ADMIN — CEFEPIME 2000 MG: 2 INJECTION, POWDER, FOR SOLUTION INTRAVENOUS at 08:38

## 2021-09-04 RX ADMIN — GUAIFENESIN 600 MG: 600 TABLET, EXTENDED RELEASE ORAL at 21:21

## 2021-09-04 RX ADMIN — MAGNESIUM HYDROXIDE 30 ML: 400 SUSPENSION ORAL at 14:36

## 2021-09-04 RX ADMIN — FERROUS SULFATE TAB EC 325 MG (65 MG FE EQUIVALENT) 325 MG: 325 (65 FE) TABLET DELAYED RESPONSE at 21:21

## 2021-09-04 RX ADMIN — FAMOTIDINE 20 MG: 20 TABLET, FILM COATED ORAL at 21:21

## 2021-09-04 RX ADMIN — OXYCODONE HYDROCHLORIDE AND ACETAMINOPHEN 1 TABLET: 5; 325 TABLET ORAL at 14:37

## 2021-09-04 RX ADMIN — Medication 1 CAPSULE: at 09:49

## 2021-09-04 RX ADMIN — SODIUM CHLORIDE, PRESERVATIVE FREE 10 ML: 5 INJECTION INTRAVENOUS at 10:03

## 2021-09-04 RX ADMIN — BUDESONIDE AND FORMOTEROL FUMARATE DIHYDRATE 2 PUFF: 160; 4.5 AEROSOL RESPIRATORY (INHALATION) at 20:44

## 2021-09-04 RX ADMIN — FAMOTIDINE 20 MG: 20 TABLET, FILM COATED ORAL at 08:43

## 2021-09-04 RX ADMIN — ONDANSETRON 4 MG: 2 INJECTION INTRAMUSCULAR; INTRAVENOUS at 17:17

## 2021-09-04 RX ADMIN — FERROUS SULFATE TAB EC 325 MG (65 MG FE EQUIVALENT) 325 MG: 325 (65 FE) TABLET DELAYED RESPONSE at 10:41

## 2021-09-04 RX ADMIN — Medication 1 CAPSULE: at 21:21

## 2021-09-04 RX ADMIN — SODIUM CHLORIDE, PRESERVATIVE FREE 10 ML: 5 INJECTION INTRAVENOUS at 09:49

## 2021-09-04 RX ADMIN — VANCOMYCIN HYDROCHLORIDE 1000 MG: 1 INJECTION, SOLUTION INTRAVENOUS at 06:03

## 2021-09-04 RX ADMIN — METOPROLOL TARTRATE 25 MG: 25 TABLET ORAL at 09:52

## 2021-09-04 RX ADMIN — APIXABAN 5 MG: 5 TABLET, FILM COATED ORAL at 21:21

## 2021-09-04 RX ADMIN — GUAIFENESIN 600 MG: 600 TABLET, EXTENDED RELEASE ORAL at 09:49

## 2021-09-04 RX ADMIN — INSULIN LISPRO 4 UNITS: 100 INJECTION, SOLUTION INTRAVENOUS; SUBCUTANEOUS at 09:59

## 2021-09-04 RX ADMIN — BUDESONIDE AND FORMOTEROL FUMARATE DIHYDRATE 2 PUFF: 160; 4.5 AEROSOL RESPIRATORY (INHALATION) at 08:53

## 2021-09-04 RX ADMIN — SIMETHICONE 80 MG: 80 TABLET, CHEWABLE ORAL at 19:01

## 2021-09-04 ASSESSMENT — PAIN DESCRIPTION - PAIN TYPE: TYPE: PHANTOM PAIN

## 2021-09-04 ASSESSMENT — PAIN SCALES - GENERAL
PAINLEVEL_OUTOF10: 4
PAINLEVEL_OUTOF10: 8
PAINLEVEL_OUTOF10: 8

## 2021-09-04 ASSESSMENT — PAIN DESCRIPTION - ORIENTATION: ORIENTATION: LEFT

## 2021-09-04 ASSESSMENT — PAIN DESCRIPTION - LOCATION: LOCATION: LEG

## 2021-09-04 NOTE — PROGRESS NOTES
[facility]  Internal Medicine Teaching Residency Program  Inpatient Daily Progress Note  ______________________________________________________________________________    Patient: Sunil Mendoza  YOB: 1955   OAU:[id number]    Acct: [de-identified]     Room: [facility]  Admit date: 8/29/2021  Today's date: 09/04/21  Number of days in the hospital: 6    SUBJECTIVE   Admitting Diagnosis: Pneumonia of right lower lobe due to infectious organism  CC: AMS    Pt examined at bedside. Chart & results reviewed. Bowel movement not passed today, bowel sounds faint, ordered x ray KUB, will follow up. No acute events overnight  Blood glucose today 215, was up to 315 around 8 pm, lantus increased to 30 units, vancocin trough pending  Patient hemodynamically stable afebrile      ROS:  Constitutional:  negative for chills, fevers, sweats  Respiratory:  negative for cough, dyspnea on exertion, hemoptysis, shortness of breath, wheezing  Cardiovascular:  negative for chest pain, chest pressure/discomfort, lower extremity edema, palpitations  Gastrointestinal:  negative for abdominal pain, constipation, diarrhea, nausea, vomiting  Neurological:  negative for dizziness, headache  BRIEF HISTORY     A 65 y.o.  male was brought to the ED from nursing home as he was unresponsive this a.m.   He has PMH of DM (on insulin), COPD, esophageal cancer (s/p esophagectomy), bilateral below-knee amputation, PE (on Eliquis), HLD, MRSA sepsis. He was recently admitted (8/10/2021) for infection of left lower extremity stump. He was discharged on vancomycin and cefepime through PICC line for 6 weeks. On my evaluation, He is saturating well on 4 L NC, /48, HR 59.    He is sleepy, not responding to voice commands but arousable with deep sternal rub.   He c/o dull frontal headache, denies nausea, fever, chest pain, cough, shortness of breath, palpitation, abdominal pain, or bowel/bladder complaints. He goes back to sleep within seconds of waking up. Unable to obtain much history from him.  Will try to obtain more history from him once his mentation improves. Information is also obtained from patient's daughter and EMR. Patient's daughter was concerned about possible unintentional Ativan overdose at the nursing home.  In the ER, on arrival-he was drowsy, with no intelligible speech, withdrawing to pain with no purposeful movement.  Intubation was considered but he woke up while obtaining Covid swab. He is maintaining saturation well on 4 L NC but remains drowsy. Breath sounds reduced and rhonchi appreciated on the right side. Work-up in ED:  EKG-unremarkable  proBNP 390  Troponin 35  Hb 9.6  WBC 22.6,  CMP: Unremarkable except CO2 15, , albumin 2.9  Creat 1.05, BUN 34  ammonia 41  Lactic acid 1.7  VBG-pH 7.2, PO2 59.7, HCO3 17.6  CXR-bilateral interstitial and airspace disease-  asymmetric edema vs multifocal infection vs pneumonitis. CT chest PE-no acute pulmonary embolism, bilateral lower lobe scarring and rounded atelectasis, diffuse tree in bud nodularity throughout right lung-likely infectious inflammatory. CT head-no acute intracranial abnormality  TSH 0.29, thyroxine 0.85  UA unremarkable except trace ketones Hgb and +3 PU    OBJECTIVE     Vital Signs:  /72   Pulse 85   Temp 97.5 °F (36.4 °C) (Oral)   Resp 18   Ht 6' 1\" (1.854 m)   Wt 164 lb 10.9 oz (74.7 kg)   SpO2 96%   BMI 21.73 kg/m²     Temp (24hrs), Av.8 °F (36.6 °C), Min:97.5 °F (36.4 °C), Max:98 °F (36.7 °C)    In: -   Out: 250 [Urine:250]    Physical Exam:  Constitutional: This is a well developed, well nourished, 18.5-24.9 - Normal 63y.o. year old male who is alert, oriented, cooperative and in no apparent distress. Head:normocephalic and atraumatic. EENT:  PERRLA. No conjunctival injections. Septum was midline, mucosa was without erythema, exudates or cobblestoning.   No thrush was noted. Neck: Supple without thyromegaly. No elevated JVP. Trachea was midline. Respiratory: Chest was symmetrical without dullness to percussion. Breath sounds bilaterally were clear to auscultation. There were no wheezes, rhonchi or rales. There is no intercostal retraction or use of accessory muscles. No egophony noted. Cardiovascular: Regular without murmur, clicks, gallops or rubs. Abdomen: Slightly rounded and soft without organomegaly. No rebound, rigidity or guarding was appreciated. Lymphatic: No lymphadenopathy. Musculoskeletal: Normal curvature of the spine. No gross muscle weakness. Extremities:  No lower extremity edema, ulcerations, tenderness, varicosities or erythema. Muscle size, tone and strength are normal.  No involuntary movements are noted. Skin:  Warm and dry. Good color, turgor and pigmentation. No lesions or scars.   No cyanosis or clubbing  Neurological/Psychiatric: The patient's general behavior, level of consciousness, thought content and emotional status is normal.        Medications:  Scheduled Medications:    naloxegol  25 mg Oral QAM    sodium chloride flush  5-40 mL IntraVENous 2 times per day    metoprolol tartrate  25 mg Oral BID    guaiFENesin  600 mg Oral BID    vancomycin  1,000 mg IntraVENous Q24H    famotidine  20 mg Oral BID    vancomycin (VANCOCIN) intermittent dosing (placeholder)   Other RX Placeholder    amitriptyline  75 mg Oral Nightly    apixaban  5 mg Oral BID    budesonide-formoterol  2 puff Inhalation BID    ferrous sulfate  325 mg Oral BID    insulin glargine  25 Units SubCUTAneous Dinner    lactobacillus  1 capsule Oral BID    tamsulosin  0.4 mg Oral Daily    sodium chloride flush  5-40 mL IntraVENous 2 times per day    cefepime  2,000 mg IntraVENous Q12H    insulin lispro  0-12 Units SubCUTAneous TID     insulin lispro  0-6 Units SubCUTAneous Nightly     Continuous Infusions:    sodium chloride      sodium chloride  dextrose       PRN Medicationscalcium carbonate, 500 mg, BID PRN  sodium chloride flush, 5-40 mL, PRN  sodium chloride, 25 mL, PRN  oxyCODONE-acetaminophen, 1 tablet, Q8H PRN  simethicone, 80 mg, Q6H PRN  albuterol sulfate HFA, 2 puff, Q6H PRN  ipratropium-albuterol, 3 mL, Q4H PRN  sodium chloride flush, 5-40 mL, PRN  sodium chloride, 25 mL, PRN  ondansetron, 4 mg, Q8H PRN   Or  ondansetron, 4 mg, Q6H PRN  acetaminophen, 650 mg, Q6H PRN   Or  acetaminophen, 650 mg, Q6H PRN  glucose, 15 g, PRN  dextrose, 12.5 g, PRN  glucagon (rDNA), 1 mg, PRN  dextrose, 100 mL/hr, PRN        Diagnostic Labs:  CBC:   Recent Labs     09/02/21  0504 09/03/21  0519 09/04/21  0541   WBC 9.1 9.4 9.8   RBC 4.41 4.39 4.23   HGB 11.3* 11.3* 11.2*   HCT 37.4* 37.7* 36.6*   MCV 84.8 85.9 86.5   RDW 16.4* 16.6* 16.5*    351 353     BMP:   Recent Labs     09/02/21  0504 09/03/21  0519 09/04/21  0541    138 136   K 4.4 4.0 3.9    105 104   CO2 23 22 22   BUN 18 19 19   CREATININE 1.05 1.09 0.93     BNP: No results for input(s): BNP in the last 72 hours. PT/INR: No results for input(s): PROTIME, INR in the last 72 hours. APTT:   Recent Labs     09/03/21  1354   APTT 24.7     CARDIAC ENZYMES: No results for input(s): CKMB, CKMBINDEX, TROPONINI in the last 72 hours. Invalid input(s): CKTOTAL;3  FASTING LIPID PANEL:  Lab Results   Component Value Date    CHOL 174 06/24/2018    HDL 49 06/24/2018    TRIG 175 (H) 06/24/2018     LIVER PROFILE: No results for input(s): AST, ALT, ALB, BILIDIR, BILITOT, ALKPHOS in the last 72 hours. MICROBIOLOGY:   Lab Results   Component Value Date/Time    CULTURE NO GROWTH 6 DAYS 08/29/2021 11:45 AM       Imaging:    CT HEAD WO CONTRAST    Result Date: 8/29/2021  No acute intracranial abnormality. CT ABDOMEN PELVIS W IV CONTRAST Additional Contrast? None    Result Date: 8/29/2021  Bilateral nephrolithiasis. Bilateral renal cysts. Hepatomegaly.      XR CHEST PORTABLE    Result Date: 9/2/2021  New right PICC line terminates at the superior caval atrial junction. XR CHEST PORTABLE    Result Date: 9/2/2021  1. Position of right PICC line remains unchanged with the tip at the level of the right subclavian vein. 2.  Stable exam of the chest.     XR CHEST PORTABLE    Result Date: 8/30/2021  Stable right and lower left lung infiltrates, most consistent with infectious/inflammatory etiology in the appropriate clinical setting. XR CHEST PORTABLE    Result Date: 8/29/2021  Bilateral interstitial and airspace disease representing any form of asymmetric edema, multifocal infection, or pneumonitis. CT CHEST PULMONARY EMBOLISM W CONTRAST    Result Date: 8/29/2021  No acute pulmonary artery embolism. Postsurgical changes secondary to esophagectomy and gastric pull-through. Bilateral lower lobe scarring and round atelectasis. New diffuse tree-in-bud nodularity throughout the right upper and right lower lobe with additional right lower lobe peribronchial thickening and increasing subpleural airspace disease and peribronchial thickening all suggesting potential infectious/inflammatory etiology. Recommend short-term interval follow-up evaluation to ensure stability or resolution. FL MODIFIED BARIUM SWALLOW W VIDEO    Result Date: 8/30/2021  No penetration or aspiration with the above administered substances. Please see separate speech pathology report for full discussion of findings and recommendations. ASSESSMENT & PLAN     ASSESSMENT / PLAN:     Acute alteration in mental status: Resolved  · UA/UDS-unremarkable  · CT head-no acute intracranial abnormality.     Aspiration pneumonia  · History of esophagectomy.    · Reduced breath sounds, rhonchi present on right side  · CXR and CT chest shows bilateral interstitial and airspace disease with new diffuse tree-in-bud nodularity in the right lung-pneumonia picture, possibly aspiration.   · Respiratory panel, molecular-remarkable  · On vancomycin 1 g q24 h and cefepime 2 g q12h  · Continue Mucinex  · Spirometry  · He passed barium swallow study.     Diabetes mellitus  · Increased lantus to 30 U  · On medium dose sliding scale     Hypertension  · on Lopresor 25 mg qd po     Hypokalemia - resolved, K 4.4 today  - will monitor BMP     Constipation  - started on naloxegol 25 mg qd  - pending x ray kub      DVT ppx : eliquis  GI ppx: famotidine    PT/OT: pt/ot on board  Discharge Planning / Yetta Dc: daughter wants to file appeal,  on board    Calixto Menchaca MD  Internal Medicine Resident, PGY-1  [facility];  [place], [place]  9/4/2021, 8:52 AM

## 2021-09-04 NOTE — CARE COORDINATION
[facility] Flow/Interdisciplinary Rounds Progress Note    Quality Flow Rounds held on September 4, 2021 at [address] Attending:  Bedside Nurse, ,  and Nursing Unit Leadership    Barriers to Discharge: Patient and daughter pursuing Medicare Appeal for Discharge    Anticipated Discharge Date:       Anticipated Discharge Disposition: SNF    Readmission Risk              Risk of Unplanned Readmission:  76           Discussed patient goal for the day, patient clinical progression, and barriers to discharge. The following Goal(s) of the Day/Commitment(s) have been identified:  Activity Progression  Transitional Care Plan     Per review of CM notes, patient's daughter stated intention of filing an Appeal for discharge. CM spoke with patient, who is a&o x4 but states his daughter is his POA and he is deferring all decisions to her. CM attempted to contact patient's daughter Shilo Cartagena at the number provided ([phone]) and reached the  for a business. CM left number for CB requesting that Delayne Punter return the call, no other information provided other than the CB number. CM then spoke with patient again, providing update that a VM was left for his daughter. Pt then stated he does not know the status of the appeal, that his daughter will know that information. Pt also stated he does not recall agreeing to referral to [facility], stating he previously had a bad experience there. CM inquired if patient would be opposed to referrals sent to other SNF, and he stated he was not opposed to it but would only do it after discussing it with his daughter. CM awaiting CB    1430 Received a call from patient's daughter Shilo Cartagena. Discussed current status of discharge and inquired about status of appeal.  Shilo Cartagena states that she did call the number provided by Medicare for the appeal and left a detailed message with them. She has not yet heard back.   Shilo Cartagena stated it is her preference that patient be d/c home. [place] discussed with Batool Schmitt that her father has been refusing to participate with PT and Klaus Chambers stated that her father has a prosthetic leg that is being delivered today at 1500 which may allow him to participate with PT/OT. She  further went on to say that she has concerns about patient not having a BM x6 days (reported to her by patient) and ongoing nausea. Batool Schmitt would like to speak to patient's physician to discuss discharge and her concerns. 1500 Per RN, MD notified that patient had c/o nausea and no BM, MD at bedside to evaluate patient. KUB ordered. Patient initially refused, but then stated he was agreeable to have KUB. RN notified [address]  who will return when able to complete the test.     1600 Patient's daughter Batool Schmitt called to notify CM that she spoke with the Medicare Appeal department and was notified that the appeal was received but that it could take several days to review as it is a holiday weekend. 1615 PS to On Call Resident for [facility] to request that Attending speak with patient's daughter Batool Schmitt. Response received, directed to contact Senior Resident. PS sent to Dr Roxie Conner, she called CM and asked that PS be sent to Dr Humberto Culver as she is the other Senior Resident and saw patient earlier today. PS sent, awaiting response. [address]. Per Dr Humberto Culver, she will speak with patient's daughter tomorrow morning.   [place] contacted patient's daughter Batool Schmitt and she is agreeable with this        Froy Jackson RN  September 4, 2021

## 2021-09-05 LAB
ABSOLUTE EOS #: 0.44 K/UL (ref 0–0.44)
ABSOLUTE IMMATURE GRANULOCYTE: 0.06 K/UL (ref 0–0.3)
ABSOLUTE LYMPH #: 2.25 K/UL (ref 1.1–3.7)
ABSOLUTE MONO #: 1.07 K/UL (ref 0.1–1.2)
ANION GAP SERPL CALCULATED.3IONS-SCNC: 9 MMOL/L (ref 9–17)
BASOPHILS # BLD: 1 % (ref 0–2)
BASOPHILS ABSOLUTE: 0.12 K/UL (ref 0–0.2)
BUN BLDV-MCNC: 15 MG/DL (ref 8–23)
BUN/CREAT BLD: ABNORMAL (ref 9–20)
CALCIUM SERPL-MCNC: 8.6 MG/DL (ref 8.6–10.4)
CHLORIDE BLD-SCNC: 98 MMOL/L (ref 98–107)
CO2: 24 MMOL/L (ref 20–31)
CREAT SERPL-MCNC: 0.84 MG/DL (ref 0.7–1.2)
DIFFERENTIAL TYPE: ABNORMAL
EOSINOPHILS RELATIVE PERCENT: 4 % (ref 1–4)
GFR AFRICAN AMERICAN: >60 ML/MIN
GFR NON-AFRICAN AMERICAN: >60 ML/MIN
GFR SERPL CREATININE-BSD FRML MDRD: ABNORMAL ML/MIN/{1.73_M2}
GFR SERPL CREATININE-BSD FRML MDRD: ABNORMAL ML/MIN/{1.73_M2}
GLUCOSE BLD-MCNC: 225 MG/DL (ref 70–99)
GLUCOSE BLD-MCNC: 228 MG/DL (ref 75–110)
GLUCOSE BLD-MCNC: 260 MG/DL (ref 75–110)
GLUCOSE BLD-MCNC: 361 MG/DL (ref 75–110)
GLUCOSE BLD-MCNC: 363 MG/DL (ref 75–110)
HCT VFR BLD CALC: 37.9 % (ref 40.7–50.3)
HEMOGLOBIN: 11.3 G/DL (ref 13–17)
IMMATURE GRANULOCYTES: 1 %
LYMPHOCYTES # BLD: 21 % (ref 24–43)
MCH RBC QN AUTO: 25.7 PG (ref 25.2–33.5)
MCHC RBC AUTO-ENTMCNC: 29.8 G/DL (ref 28.4–34.8)
MCV RBC AUTO: 86.1 FL (ref 82.6–102.9)
MONOCYTES # BLD: 10 % (ref 3–12)
NRBC AUTOMATED: 0 PER 100 WBC
PDW BLD-RTO: 16.2 % (ref 11.8–14.4)
PLATELET # BLD: 355 K/UL (ref 138–453)
PLATELET ESTIMATE: ABNORMAL
PMV BLD AUTO: 9.9 FL (ref 8.1–13.5)
POTASSIUM SERPL-SCNC: 3.8 MMOL/L (ref 3.7–5.3)
RBC # BLD: 4.4 M/UL (ref 4.21–5.77)
RBC # BLD: ABNORMAL 10*6/UL
SEG NEUTROPHILS: 63 % (ref 36–65)
SEGMENTED NEUTROPHILS ABSOLUTE COUNT: 6.95 K/UL (ref 1.5–8.1)
SODIUM BLD-SCNC: 131 MMOL/L (ref 135–144)
VANCOMYCIN TROUGH DATE LAST DOSE: NORMAL
VANCOMYCIN TROUGH DOSE AMOUNT: NORMAL
VANCOMYCIN TROUGH TIME LAST DOSE: NORMAL
VANCOMYCIN TROUGH: 10.7 UG/ML (ref 10–20)
WBC # BLD: 10.9 K/UL (ref 3.5–11.3)
WBC # BLD: ABNORMAL 10*3/UL

## 2021-09-05 PROCEDURE — 94640 AIRWAY INHALATION TREATMENT: CPT

## 2021-09-05 PROCEDURE — 36415 COLL VENOUS BLD VENIPUNCTURE: CPT

## 2021-09-05 PROCEDURE — 6370000000 HC RX 637 (ALT 250 FOR IP): Performed by: STUDENT IN AN ORGANIZED HEALTH CARE EDUCATION/TRAINING PROGRAM

## 2021-09-05 PROCEDURE — 99221 1ST HOSP IP/OBS SF/LOW 40: CPT | Performed by: INTERNAL MEDICINE

## 2021-09-05 PROCEDURE — 99232 SBSQ HOSP IP/OBS MODERATE 35: CPT | Performed by: INTERNAL MEDICINE

## 2021-09-05 PROCEDURE — 94761 N-INVAS EAR/PLS OXIMETRY MLT: CPT

## 2021-09-05 PROCEDURE — 80048 BASIC METABOLIC PNL TOTAL CA: CPT

## 2021-09-05 PROCEDURE — 97535 SELF CARE MNGMENT TRAINING: CPT

## 2021-09-05 PROCEDURE — 82947 ASSAY GLUCOSE BLOOD QUANT: CPT

## 2021-09-05 PROCEDURE — 6370000000 HC RX 637 (ALT 250 FOR IP)

## 2021-09-05 PROCEDURE — 2060000000 HC ICU INTERMEDIATE R&B

## 2021-09-05 PROCEDURE — 6360000002 HC RX W HCPCS: Performed by: INTERNAL MEDICINE

## 2021-09-05 PROCEDURE — 2580000003 HC RX 258: Performed by: STUDENT IN AN ORGANIZED HEALTH CARE EDUCATION/TRAINING PROGRAM

## 2021-09-05 PROCEDURE — 6360000002 HC RX W HCPCS: Performed by: STUDENT IN AN ORGANIZED HEALTH CARE EDUCATION/TRAINING PROGRAM

## 2021-09-05 PROCEDURE — 85025 COMPLETE CBC W/AUTO DIFF WBC: CPT

## 2021-09-05 PROCEDURE — 80202 ASSAY OF VANCOMYCIN: CPT

## 2021-09-05 PROCEDURE — 2580000003 HC RX 258: Performed by: INTERNAL MEDICINE

## 2021-09-05 RX ORDER — LACTULOSE 10 G/15ML
20 SOLUTION ORAL 3 TIMES DAILY
Status: DISCONTINUED | OUTPATIENT
Start: 2021-09-05 | End: 2021-09-08 | Stop reason: HOSPADM

## 2021-09-05 RX ORDER — INSULIN GLARGINE 100 [IU]/ML
14 INJECTION, SOLUTION SUBCUTANEOUS ONCE
Status: COMPLETED | OUTPATIENT
Start: 2021-09-05 | End: 2021-09-05

## 2021-09-05 RX ORDER — INSULIN GLARGINE 100 [IU]/ML
INJECTION, SOLUTION SUBCUTANEOUS NIGHTLY
Status: CANCELLED | OUTPATIENT
Start: 2021-09-05

## 2021-09-05 RX ORDER — VANCOMYCIN HYDROCHLORIDE 1 G/200ML
1000 INJECTION, SOLUTION INTRAVENOUS
Status: DISCONTINUED | OUTPATIENT
Start: 2021-09-06 | End: 2021-09-05

## 2021-09-05 RX ADMIN — INSULIN LISPRO 6 UNITS: 100 INJECTION, SOLUTION INTRAVENOUS; SUBCUTANEOUS at 12:14

## 2021-09-05 RX ADMIN — FAMOTIDINE 20 MG: 20 TABLET, FILM COATED ORAL at 20:38

## 2021-09-05 RX ADMIN — FERROUS SULFATE TAB EC 325 MG (65 MG FE EQUIVALENT) 325 MG: 325 (65 FE) TABLET DELAYED RESPONSE at 09:27

## 2021-09-05 RX ADMIN — GUAIFENESIN 600 MG: 600 TABLET, EXTENDED RELEASE ORAL at 20:38

## 2021-09-05 RX ADMIN — CEFEPIME 2000 MG: 2 INJECTION, POWDER, FOR SOLUTION INTRAVENOUS at 09:32

## 2021-09-05 RX ADMIN — CEFEPIME 2000 MG: 2 INJECTION, POWDER, FOR SOLUTION INTRAVENOUS at 18:45

## 2021-09-05 RX ADMIN — FAMOTIDINE 20 MG: 20 TABLET, FILM COATED ORAL at 09:27

## 2021-09-05 RX ADMIN — MAGNESIUM HYDROXIDE 30 ML: 400 SUSPENSION ORAL at 09:29

## 2021-09-05 RX ADMIN — SIMETHICONE 80 MG: 80 TABLET, CHEWABLE ORAL at 20:38

## 2021-09-05 RX ADMIN — APIXABAN 5 MG: 5 TABLET, FILM COATED ORAL at 20:38

## 2021-09-05 RX ADMIN — ONDANSETRON 4 MG: 4 TABLET, ORALLY DISINTEGRATING ORAL at 16:56

## 2021-09-05 RX ADMIN — INSULIN LISPRO 4 UNITS: 100 INJECTION, SOLUTION INTRAVENOUS; SUBCUTANEOUS at 18:01

## 2021-09-05 RX ADMIN — INSULIN LISPRO 10 UNITS: 100 INJECTION, SOLUTION INTRAVENOUS; SUBCUTANEOUS at 09:09

## 2021-09-05 RX ADMIN — Medication 1 CAPSULE: at 09:27

## 2021-09-05 RX ADMIN — INSULIN LISPRO 5 UNITS: 100 INJECTION, SOLUTION INTRAVENOUS; SUBCUTANEOUS at 20:43

## 2021-09-05 RX ADMIN — VANCOMYCIN HYDROCHLORIDE 1000 MG: 1 INJECTION, SOLUTION INTRAVENOUS at 06:29

## 2021-09-05 RX ADMIN — APIXABAN 5 MG: 5 TABLET, FILM COATED ORAL at 09:27

## 2021-09-05 RX ADMIN — GUAIFENESIN 600 MG: 600 TABLET, EXTENDED RELEASE ORAL at 09:27

## 2021-09-05 RX ADMIN — METOPROLOL TARTRATE 25 MG: 25 TABLET ORAL at 20:38

## 2021-09-05 RX ADMIN — SODIUM CHLORIDE, PRESERVATIVE FREE 10 ML: 5 INJECTION INTRAVENOUS at 09:29

## 2021-09-05 RX ADMIN — AMITRIPTYLINE HYDROCHLORIDE 75 MG: 50 TABLET, FILM COATED ORAL at 20:37

## 2021-09-05 RX ADMIN — SODIUM CHLORIDE, PRESERVATIVE FREE 10 ML: 5 INJECTION INTRAVENOUS at 20:38

## 2021-09-05 RX ADMIN — METOPROLOL TARTRATE 25 MG: 25 TABLET ORAL at 09:20

## 2021-09-05 RX ADMIN — BUDESONIDE AND FORMOTEROL FUMARATE DIHYDRATE 2 PUFF: 160; 4.5 AEROSOL RESPIRATORY (INHALATION) at 08:05

## 2021-09-05 RX ADMIN — TAMSULOSIN HYDROCHLORIDE 0.4 MG: 0.4 CAPSULE ORAL at 09:20

## 2021-09-05 RX ADMIN — INSULIN GLARGINE 30 UNITS: 100 INJECTION, SOLUTION SUBCUTANEOUS at 18:03

## 2021-09-05 RX ADMIN — Medication 1 CAPSULE: at 20:38

## 2021-09-05 RX ADMIN — SODIUM CHLORIDE, PRESERVATIVE FREE 10 ML: 5 INJECTION INTRAVENOUS at 09:27

## 2021-09-05 RX ADMIN — INSULIN GLARGINE 14 UNITS: 100 INJECTION, SOLUTION SUBCUTANEOUS at 12:10

## 2021-09-05 RX ADMIN — FERROUS SULFATE TAB EC 325 MG (65 MG FE EQUIVALENT) 325 MG: 325 (65 FE) TABLET DELAYED RESPONSE at 20:38

## 2021-09-05 ASSESSMENT — PAIN DESCRIPTION - DESCRIPTORS: DESCRIPTORS: SHOOTING

## 2021-09-05 ASSESSMENT — PAIN DESCRIPTION - FREQUENCY: FREQUENCY: CONTINUOUS

## 2021-09-05 ASSESSMENT — PAIN SCALES - GENERAL
PAINLEVEL_OUTOF10: 8
PAINLEVEL_OUTOF10: 0
PAINLEVEL_OUTOF10: 7

## 2021-09-05 ASSESSMENT — ENCOUNTER SYMPTOMS: TACHYPNEA: 1

## 2021-09-05 ASSESSMENT — PAIN DESCRIPTION - PAIN TYPE: TYPE: PHANTOM PAIN

## 2021-09-05 ASSESSMENT — PAIN DESCRIPTION - LOCATION: LOCATION: LEG

## 2021-09-05 ASSESSMENT — PAIN DESCRIPTION - ORIENTATION: ORIENTATION: LEFT

## 2021-09-05 ASSESSMENT — PAIN DESCRIPTION - PROGRESSION: CLINICAL_PROGRESSION: NOT CHANGED
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
## 2021-09-05 NOTE — PROGRESS NOTES
Physical Therapy        Physical Therapy Cancel Note      DATE: 2021    NAME: Christos Chowdhury  MRN: [id number]   : 1955      Patient not seen this date for Physical Therapy due to:    Patient Declined: Pt states he's too nauseated to participate.       Electronically signed by Kinga Claire PTA on 2021 at 2:30 PM

## 2021-09-05 NOTE — CARE COORDINATION
TRANSITIONAL CARE PLANNING/ DISCHARGE ONGOING EVALUATION    Patient has submitted an Appeal with Medicare for his discharge. CM spoke with Radha with [employer] this morning ([phone], or [phone]), apparently they sent documentation to an incorrect fax number. CM provided correct fax number for them to send request for documentation ([phone]). All requested documentation obtained and sent as an attachment utilizing the [employer] Portal option on the [employer] site.

## 2021-09-05 NOTE — DISCHARGE SUMMARY
[address]     Department of Internal Medicine - Staff Internal Medicine Teaching Service    INPATIENT DISCHARGE SUMMARY      Patient Identification:  Jacob Nair is a 63 y.o. male. :  1955  MRN: [id number]     Acct: [de-identified]   PCP: Rasheed Chadwick DO  Admit Date:  2021  Discharge date and time: No discharge date for patient encounter. Attending Provider: Skyler Mccray MD                                     [facility] Problem Lists:  Principal Problem:    Pneumonia of right lower lobe due to infectious organism  Active Problems:    Type 2 diabetes mellitus with diabetic polyneuropathy, with long-term current use of insulin (HCC)    Esophageal cancer (HCC)    COPD without exacerbation ([address]    HLD (hyperlipidemia)    PAD (peripheral artery disease) (HCC)    S/P BKA (below knee amputation) bilateral (HCC)    Moderate malnutrition ([address]    History of below knee amputation, right ([address]    History of below knee amputation, left ([address]    Metabolic encephalopathy    Altered mental status  Resolved Problems:    * No resolved hospital problems. *      HOSPITAL STAY     Brief Inpatient course:   Jacob Nair is a 63 y.o. male who was admitted for the management of Pneumonia of right lower lobe due to infectious organism, presented to the emergency department with PMH of DM on insulin, COPD, esophageal cancer s/p esophagectomy, bilateral below knee amputation, PE (on eliqius), HLD, MRSA sepsis came in for being unresponsive from SNF. He was recently admitted (8/10/2021) for infection of left lower extremity stump. He was discharged on vancomycin and cefepime through PICC line for 6 weeks. On my evaluation, He is saturating well on 4 L NC, /48, HR 59.    He is sleepy, not responding to voice commands but arousable with deep sternal rub.   He c/o dull frontal headache, denies nausea, fever, chest pain, cough, shortness of breath, palpitation, abdominal pain, or bowel/bladder complaints. During his admission he was treated for -     Acute alteration in mental status: Resolved  · UA/UDS-unremarkable  · CT head-no acute intracranial abnormality.     Aspiration pneumonia / previous osteomyelitis of left BKA, wound culture positive for MRSA and gram negative rods  · Treated with vancomycin 1 g q24 h and cefepime 2 g q12h  · Continue Mucinex    Diabetes mellitus  · Treated with lantus to 30 U, On medium dose sliding scale     Hypertension  · on Lopresor 25 mg qd po    Procedures/ Significant Interventions:        Consults:     Consults:     Final Specialist Recommendations/Findings:   PHARMACY TO DOSE VANCOMYCIN  IP CONSULT TO INTERNAL MEDICINE  IP CONSULT TO CASE MANAGEMENT  PHARMACY TO DOSE VANCOMYCIN  IP CONSULT TO DIETITIAN      Any Hospital Acquired Infections: none    Discharge Functional Status:  stable    DISCHARGE PLAN     Disposition: [facility]    Patient Instructions:   Current Discharge Medication List      START taking these medications    Details   metoprolol tartrate (LOPRESSOR) 25 MG tablet Take 1 tablet by mouth 2 times daily Hold for heart rate less than 60 or systolic blood pressure less than 100  Qty: 60 tablet, Refills: 3      guaiFENesin (MUCINEX) 600 MG extended release tablet Take 1 tablet by mouth 2 times daily for 10 days  Qty: 20 tablet, Refills: 0      cefepime (MAXIPIME) infusion Infuse 2,000 mg intravenously every 8 hours for 8 days Compound per protocol  Qty: 48 g, Refills: 0      vancomycin (VANCOCIN) infusion Infuse 1,000 mg intravenously every 24 hours for 8 days Compound per protocol.   Qty: 8000 mg, Refills: 0         CONTINUE these medications which have NOT CHANGED    Details   simethicone (MYLICON) 80 MG chewable tablet Take 1 tablet by mouth every 6 hours as needed for Flatulence  Qty: 180 tablet, Refills: 3      ferrous sulfate (IRON 325) 325 (65 Fe) MG tablet Take 1 tablet by mouth 2 times daily  Qty: 180 tablet, Refills: 1      naloxone 4 MG/0.1ML LIQD nasal spray 1 spray by Nasal route as needed for Opioid Reversal  Qty: 1 each, Refills: 5      polyethylene glycol (GLYCOLAX) 17 g packet Take 17 g by mouth daily as needed for Constipation      Multiple Vitamins-Minerals (THERAPEUTIC MULTIVITAMIN-MINERALS) tablet Take 1 tablet by mouth daily      aluminum & magnesium hydroxide-simethicone (MAALOX) 200-200-20 MG/5ML SUSP suspension Take 10 mLs by mouth every 6 hours as needed for Indigestion       ondansetron (ZOFRAN) 4 MG tablet Take 4 mg by mouth every 8 hours as needed for Nausea or Vomiting      senna (SENOKOT) 8.6 MG tablet Take 2 tablets by mouth 2 times daily as needed for Constipation      glucose (GLUTOSE) 40 % GEL Take 37.5 mLs by mouth as needed (hypoglycemia)  Qty: 45 g, Refills: 1      !! insulin lispro (HUMALOG) 100 UNIT/ML injection vial Inject 0-6 Units into the skin 3 times daily (with meals)  Qty: 1 vial, Refills: 3      !! insulin lispro (HUMALOG) 100 UNIT/ML injection vial Inject 0-3 Units into the skin nightly  Qty: 1 vial, Refills: 3      bisacodyl (DULCOLAX) 5 MG EC tablet Take 1 tablet by mouth daily as needed for Constipation  Qty:        tamsulosin (FLOMAX) 0.4 MG capsule Take 1 capsule by mouth daily  Qty: 30 capsule, Refills: 3      hydrOXYzine (VISTARIL) 25 MG capsule Take 25 mg by mouth 3 times daily as needed      amitriptyline (ELAVIL) 75 MG tablet Take 1 tablet by mouth nightly  Qty: 30 tablet, Refills: 3    Comments: Cancel doxepin      glucose monitoring kit (FREESTYLE) monitoring kit 1 kit by Does not apply route daily  Qty: 1 kit, Refills: 0      blood glucose test strips (ASCENSIA AUTODISC VI;ONE TOUCH ULTRA TEST VI) strip Use with associated glucose meter to check fluctuating blood sugars BID  Qty: 500 strip, Refills: 11      Lancets MISC 1 each by Does not apply route 3 times daily  Qty: 600 each, Refills: 1      famotidine (PEPCID) 20 MG tablet Take 20 mg by mouth 2 times daily      budesonide-formoterol (SYMBICORT) 160-4.5 MCG/ACT AERO Inhale 2 puffs into the lungs 2 times daily  Qty: 1 Inhaler, Refills: 3      ipratropium-albuterol (DUONEB) 0.5-2.5 (3) MG/3ML SOLN nebulizer solution Inhale 3 mLs into the lungs every 4 hours as needed for Shortness of Breath  Qty: 360 mL, Refills: 1      insulin glargine (LANTUS) 100 UNIT/ML injection vial Inject 25 Units into the skin Daily with supper  Qty: 1 vial, Refills: 3      lactobacillus (BACID) TABS Take 1 tablet by mouth 2 times daily  Qty: 30 tablet, Refills: 0      apixaban (ELIQUIS) 5 MG TABS tablet Take 1 tablet by mouth 2 times daily  Qty: 180 tablet, Refills: 1      albuterol sulfate HFA (VENTOLIN HFA) 108 (90 Base) MCG/ACT inhaler Inhale 2 puffs into the lungs every 6 hours as needed for Wheezing      metFORMIN (GLUCOPHAGE) 500 MG tablet Take 1 tablet by mouth 2 times daily (with meals)  Qty: 180 tablet, Refills: 5    Comments: **Patient requests 90 days supply**       !! - Potential duplicate medications found. Please discuss with provider. Activity: activity as tolerated    Diet: diabetic diet    Follow-up:    Caroline Brown MD  [address] [place] [address]  [phone]      For osteomyeltis of stump     Denice DO Rasheeda Arora [address] [place] [address]  [phone]    Schedule an appointment as soon as possible for a visit  hospital follow up       Patient Instructions: continue vancomycin 1 g q24h and cefepime 2 g q12 h till 9th September   Monitor blood glucose at home  Take insulin as prescribed  Follow up with PCP and infectious disease specialist.  Follow up labs: Follow up imaging:     Calixto Menchaca MD, MD  Internal Medicine Resident, PGY-1  [place], [place]  9/5/2021, 10:26 AM

## 2021-09-05 NOTE — PROGRESS NOTES
Pharmacy Vancomycin Consult     Vancomycin Day: 8  Current Dosin mg IV q24h  Current indication: pneumonia      Recent Labs     21  0541 21  0511   BUN 19 15   CREATININE 0.93 0.84   WBC 9.8 10.9       Intake/Output Summary (Last 24 hours) at 2021 0844  Last data filed at 2021 0630  Gross per 24 hour   Intake 650 ml   Output 1200 ml   Net -550 ml       Ht Readings from Last 1 Encounters:   21 6' 1\" (1.854 m)        Wt Readings from Last 1 Encounters:   21 164 lb 10.9 oz (74.7 kg)       Body mass index is 21.73 kg/m². Estimated Creatinine Clearance: 95 mL/min (based on SCr of 0.84 mg/dL). Trough: 10.7 mcg/mL    Assessment/Plan:  Trough below desired range of 15-20 mcg/mL for indication of pneumonia. Will increase regimen to 1250 mg IV q24h. Cate Bernard D., [facility]  2021 8:45 AM

## 2021-09-05 NOTE — PLAN OF CARE
Problem: Breathing Pattern - Ineffective:  Goal: Ability to achieve and maintain a regular respiratory rate will improve  Description: Ability to achieve and maintain a regular respiratory rate will improve  Outcome: Ongoing     Problem: Nutrition  Goal: Optimal nutrition therapy  Outcome: Ongoing     Problem: Skin Integrity:  Goal: Will show no infection signs and symptoms  Description: Will show no infection signs and symptoms  Outcome: Ongoing     Problem: Skin Integrity:  Goal: Absence of new skin breakdown  Description: Absence of new skin breakdown  Outcome: Ongoing     Problem: Falls - Risk of:  Goal: Will remain free from falls  Description: Will remain free from falls  Outcome: Ongoing     Problem: Falls - Risk of:  Goal: Absence of physical injury  Description: Absence of physical injury  Outcome: Ongoing     Problem: Pain:  Goal: Pain level will decrease  Description: Pain level will decrease  Outcome: Ongoing

## 2021-09-05 NOTE — PROGRESS NOTES
Soaps suds enema given, pt tolerated well. Electronically signed by Tammy Barrera RN on 9/5/2021 at 11:47 AM

## 2021-09-05 NOTE — PROGRESS NOTES
[facility]  Internal Medicine Teaching Residency Program  Inpatient Daily Progress Note  ______________________________________________________________________________    Patient: Shon Melara  YOB: 1955   CTC:[id number]    Acct: [de-identified]     Room: [address] [place] [address]  Admit date: 8/29/2021  Today's date: 09/05/21  Number of days in the hospital: 7    SUBJECTIVE   Admitting Diagnosis: Pneumonia of right lower lobe due to infectious organism  CC: AMS  Pt examined at bedside. Chart & results reviewed. Labs Na 131, blood glucose POC today 363, patient was given lantus dose last night by the RN. Given lantus 14 U in the morning. Vanc trough 10.7  Patient hemodynamically stable afebrile. Patient didn't have bowel movement yesterday, bowel sounds present and passing gas. Ordered enema for today, started on lactulose, d/c naloxegol    ROS:  Constitutional:  negative for chills, fevers, sweats  Respiratory:  negative for cough, dyspnea on exertion, hemoptysis, shortness of breath, wheezing  Cardiovascular:  negative for chest pain, chest pressure/discomfort, lower extremity edema, palpitations  Gastrointestinal:  negative for abdominal pain, constipation, diarrhea, nausea, vomiting  Neurological:  negative for dizziness, headache  BRIEF HISTORY     A 65 y.o.  male was brought to the ED from nursing home as he was unresponsive this a.m.   He has PMH of DM (on insulin), COPD, esophageal cancer (s/p esophagectomy), bilateral below-knee amputation, PE (on Eliquis), HLD, MRSA sepsis. He was recently admitted (8/10/2021) for infection of left lower extremity stump. He was discharged on vancomycin and cefepime through PICC line for 6 weeks. On my evaluation, He is saturating well on 4 L NC, /48, HR 59.    He is sleepy, not responding to voice commands but arousable with deep sternal rub.   He c/o dull frontal headache, denies nausea, fever, chest pain, cough, shortness of breath, palpitation, abdominal pain, or bowel/bladder complaints. He goes back to sleep within seconds of waking up. Unable to obtain much history from him.  Will try to obtain more history from him once his mentation improves. Information is also obtained from patient's daughter and EMR. Patient's daughter was concerned about possible unintentional Ativan overdose at the nursing home.  In the ER, on arrival-he was drowsy, with no intelligible speech, withdrawing to pain with no purposeful movement.  Intubation was considered but he woke up while obtaining Covid swab. He is maintaining saturation well on 4 L NC but remains drowsy. Breath sounds reduced and rhonchi appreciated on the right side. Work-up in ED:  EKG-unremarkable  proBNP 390  Troponin 35  Hb 9.6  WBC 22.6,  CMP: Unremarkable except CO2 15, , albumin 2.9  Creat 1.05, BUN 34  ammonia 41  Lactic acid 1.7  VBG-pH 7.2, PO2 59.7, HCO3 17.6  CXR-bilateral interstitial and airspace disease-  asymmetric edema vs multifocal infection vs pneumonitis. CT chest PE-no acute pulmonary embolism, bilateral lower lobe scarring and rounded atelectasis, diffuse tree in bud nodularity throughout right lung-likely infectious inflammatory. CT head-no acute intracranial abnormality  TSH 0.29, thyroxine 0.85  UA unremarkable except trace ketones Hgb and +3 PU    OBJECTIVE     Vital Signs:  BP (!) 112/96   Pulse 86   Temp 97.5 °F (36.4 °C) (Oral)   Resp 18   Ht 6' 1\" (1.854 m)   Wt 164 lb 10.9 oz (74.7 kg)   SpO2 94%   BMI 21.73 kg/m²     Temp (24hrs), Av.7 °F (36.5 °C), Min:97.4 °F (36.3 °C), Max:98 °F (36.7 °C)    In: 200   Out: 500 [Urine:500]    Physical Exam:  Constitutional: This is a well developed, well nourished, 18.5-24.9 - Normal 63y.o. year old male who is alert, oriented, cooperative and in no apparent distress. Head:normocephalic and atraumatic. EENT:  PERRLA. No conjunctival injections.    Septum was midline, mucosa was without erythema, exudates or cobblestoning. No thrush was noted. Neck: Supple without thyromegaly. No elevated JVP. Trachea was midline. Respiratory: Chest was symmetrical without dullness to percussion. Breath sounds bilaterally were clear to auscultation. There were no wheezes, rhonchi or rales. There is no intercostal retraction or use of accessory muscles. No egophony noted. Cardiovascular: Regular without murmur, clicks, gallops or rubs. Abdomen: Slightly rounded and soft without organomegaly. No rebound, rigidity or guarding was appreciated. Lymphatic: No lymphadenopathy. Musculoskeletal: Normal curvature of the spine. No gross muscle weakness. Extremities:  No lower extremity edema, ulcerations, tenderness, varicosities or erythema. Muscle size, tone and strength are normal.  No involuntary movements are noted. Skin:  Warm and dry. Good color, turgor and pigmentation. No lesions or scars.   No cyanosis or clubbing  Neurological/Psychiatric: The patient's general behavior, level of consciousness, thought content and emotional status is normal.        Medications:  Scheduled Medications:    insulin glargine  30 Units SubCUTAneous Dinner    magnesium hydroxide  30 mL Oral BID    naloxegol  25 mg Oral QAM    sodium chloride flush  5-40 mL IntraVENous 2 times per day    metoprolol tartrate  25 mg Oral BID    guaiFENesin  600 mg Oral BID    vancomycin  1,000 mg IntraVENous Q24H    famotidine  20 mg Oral BID    vancomycin (VANCOCIN) intermittent dosing (placeholder)   Other RX Placeholder    amitriptyline  75 mg Oral Nightly    apixaban  5 mg Oral BID    budesonide-formoterol  2 puff Inhalation BID    ferrous sulfate  325 mg Oral BID    lactobacillus  1 capsule Oral BID    tamsulosin  0.4 mg Oral Daily    sodium chloride flush  5-40 mL IntraVENous 2 times per day    cefepime  2,000 mg IntraVENous Q12H    insulin lispro  0-12 Units SubCUTAneous TID WC    insulin lispro  0-6 Units SubCUTAneous Nightly     Continuous Infusions:    sodium chloride      sodium chloride      dextrose       PRN Medicationscalcium carbonate, 500 mg, BID PRN  sodium chloride flush, 5-40 mL, PRN  sodium chloride, 25 mL, PRN  oxyCODONE-acetaminophen, 1 tablet, Q8H PRN  simethicone, 80 mg, Q6H PRN  albuterol sulfate HFA, 2 puff, Q6H PRN  ipratropium-albuterol, 3 mL, Q4H PRN  sodium chloride flush, 5-40 mL, PRN  sodium chloride, 25 mL, PRN  ondansetron, 4 mg, Q8H PRN   Or  ondansetron, 4 mg, Q6H PRN  acetaminophen, 650 mg, Q6H PRN   Or  acetaminophen, 650 mg, Q6H PRN  glucose, 15 g, PRN  dextrose, 12.5 g, PRN  glucagon (rDNA), 1 mg, PRN  dextrose, 100 mL/hr, PRN        Diagnostic Labs:  CBC:   Recent Labs     09/03/21  0519 09/04/21  0541 09/05/21  0511   WBC 9.4 9.8 10.9   RBC 4.39 4.23 4.40   HGB 11.3* 11.2* 11.3*   HCT 37.7* 36.6* 37.9*   MCV 85.9 86.5 86.1   RDW 16.6* 16.5* 16.2*    353 355     BMP:   Recent Labs     09/03/21  0519 09/04/21  0541 09/05/21  0511    136 131*   K 4.0 3.9 3.8    104 98   CO2 22 22 24   BUN 19 19 15   CREATININE 1.09 0.93 0.84     BNP: No results for input(s): BNP in the last 72 hours. PT/INR: No results for input(s): PROTIME, INR in the last 72 hours. APTT:   Recent Labs     09/03/21  1354   APTT 24.7     CARDIAC ENZYMES: No results for input(s): CKMB, CKMBINDEX, TROPONINI in the last 72 hours. Invalid input(s): CKTOTAL;3  FASTING LIPID PANEL:  Lab Results   Component Value Date    CHOL 174 06/24/2018    HDL 49 06/24/2018    TRIG 175 (H) 06/24/2018     LIVER PROFILE: No results for input(s): AST, ALT, ALB, BILIDIR, BILITOT, ALKPHOS in the last 72 hours. MICROBIOLOGY:   Lab Results   Component Value Date/Time    CULTURE NO GROWTH 6 DAYS 08/29/2021 11:45 AM       Imaging:    XR ABDOMEN (KUB) (SINGLE AP VIEW)    Result Date: 9/4/2021  Nonspecific, nonobstructive bowel gas pattern.      CT HEAD WO CONTRAST    Result Date: 8/29/2021  No acute intracranial abnormality. CT ABDOMEN PELVIS W IV CONTRAST Additional Contrast? None    Result Date: 8/29/2021  Bilateral nephrolithiasis. Bilateral renal cysts. Hepatomegaly. XR CHEST PORTABLE    Result Date: 9/2/2021  New right PICC line terminates at the superior caval atrial junction. XR CHEST PORTABLE    Result Date: 9/2/2021  1. Position of right PICC line remains unchanged with the tip at the level of the right subclavian vein. 2.  Stable exam of the chest.     XR CHEST PORTABLE    Result Date: 8/30/2021  Stable right and lower left lung infiltrates, most consistent with infectious/inflammatory etiology in the appropriate clinical setting. XR CHEST PORTABLE    Result Date: 8/29/2021  Bilateral interstitial and airspace disease representing any form of asymmetric edema, multifocal infection, or pneumonitis. CT CHEST PULMONARY EMBOLISM W CONTRAST    Result Date: 8/29/2021  No acute pulmonary artery embolism. Postsurgical changes secondary to esophagectomy and gastric pull-through. Bilateral lower lobe scarring and round atelectasis. New diffuse tree-in-bud nodularity throughout the right upper and right lower lobe with additional right lower lobe peribronchial thickening and increasing subpleural airspace disease and peribronchial thickening all suggesting potential infectious/inflammatory etiology. Recommend short-term interval follow-up evaluation to ensure stability or resolution. FL MODIFIED BARIUM SWALLOW W VIDEO    Result Date: 8/30/2021  No penetration or aspiration with the above administered substances. Please see separate speech pathology report for full discussion of findings and recommendations.        ASSESSMENT & PLAN     ASSESSMENT / PLAN:     Principal Problem:    Pneumonia of right lower lobe due to infectious organism  Active Problems:    Type 2 diabetes mellitus with diabetic polyneuropathy, with long-term current use of insulin (HCC)    Esophageal cancer ([facility] [address] COPD without exacerbation ([address]    HLD (hyperlipidemia)    PAD (peripheral artery disease) (HCC)    S/P BKA (below knee amputation) bilateral (HCC)    Moderate malnutrition ([address]    History of below knee amputation, right ([address]    History of below knee amputation, left (HCC)    Metabolic encephalopathy    Altered mental status  Resolved Problems:    * No resolved hospital problems. *    Acute alteration in mental status: Resolved  · UA/UDS-unremarkable  · CT head-no acute intracranial abnormality.     Aspiration pneumonia/ previous osteomyelitis of left BKA, wound culture positive for MRSA and gram negative rods  · History of esophagectomy.    · CXR and CT chest shows bilateral interstitial and airspace disease with new diffuse tree-in-bud nodularity in the right lung-pneumonia picture, possibly aspiration. · Respiratory panel, molecular-remarkable  · On vancomycin 1 g q24 h and cefepime 2 g q12h till 09/07/21  · Continue Mucinex  · Spirometry     Diabetes mellitus  · Increased lantus to 30 U  · On medium dose sliding scale     Hypertension  · on Lopresor 25 mg qd po     Hypokalemia - resolved, K 4.4 today  - will monitor BMP     Constipation  - ordered enema  - started on lactulose  - d/c naloxegol 25 mg qd  - pending x ray kub    DVT ppx : eliquis  GI ppx: famotidine    PT/OT: pt/ot on board  Discharge Planning / Low Burden: appeal filed by daughter as per  notes. Chilango Perdomo MD  Internal Medicine Resident, PGY-1  [address], [place]  9/5/2021, 8:26 AM

## 2021-09-06 LAB
ABSOLUTE EOS #: 0.39 K/UL (ref 0–0.44)
ABSOLUTE IMMATURE GRANULOCYTE: 0.07 K/UL (ref 0–0.3)
ABSOLUTE LYMPH #: 1.91 K/UL (ref 1.1–3.7)
ABSOLUTE MONO #: 1.11 K/UL (ref 0.1–1.2)
ANION GAP SERPL CALCULATED.3IONS-SCNC: 12 MMOL/L (ref 9–17)
BASOPHILS # BLD: 1 % (ref 0–2)
BASOPHILS ABSOLUTE: 0.12 K/UL (ref 0–0.2)
BUN BLDV-MCNC: 20 MG/DL (ref 8–23)
BUN/CREAT BLD: ABNORMAL (ref 9–20)
CALCIUM SERPL-MCNC: 8.8 MG/DL (ref 8.6–10.4)
CHLORIDE BLD-SCNC: 99 MMOL/L (ref 98–107)
CO2: 22 MMOL/L (ref 20–31)
CREAT SERPL-MCNC: 0.83 MG/DL (ref 0.7–1.2)
DIFFERENTIAL TYPE: ABNORMAL
EOSINOPHILS RELATIVE PERCENT: 4 % (ref 1–4)
GFR AFRICAN AMERICAN: >60 ML/MIN
GFR NON-AFRICAN AMERICAN: >60 ML/MIN
GFR SERPL CREATININE-BSD FRML MDRD: ABNORMAL ML/MIN/{1.73_M2}
GFR SERPL CREATININE-BSD FRML MDRD: ABNORMAL ML/MIN/{1.73_M2}
GLUCOSE BLD-MCNC: 180 MG/DL (ref 75–110)
GLUCOSE BLD-MCNC: 185 MG/DL (ref 70–99)
GLUCOSE BLD-MCNC: 215 MG/DL (ref 75–110)
GLUCOSE BLD-MCNC: 240 MG/DL (ref 75–110)
GLUCOSE BLD-MCNC: 272 MG/DL (ref 75–110)
HCT VFR BLD CALC: 38 % (ref 40.7–50.3)
HEMOGLOBIN: 11.4 G/DL (ref 13–17)
IMMATURE GRANULOCYTES: 1 %
LYMPHOCYTES # BLD: 18 % (ref 24–43)
MCH RBC QN AUTO: 26 PG (ref 25.2–33.5)
MCHC RBC AUTO-ENTMCNC: 30 G/DL (ref 28.4–34.8)
MCV RBC AUTO: 86.8 FL (ref 82.6–102.9)
MONOCYTES # BLD: 10 % (ref 3–12)
NRBC AUTOMATED: 0 PER 100 WBC
PDW BLD-RTO: 16.2 % (ref 11.8–14.4)
PLATELET # BLD: 361 K/UL (ref 138–453)
PLATELET ESTIMATE: ABNORMAL
PMV BLD AUTO: 10.2 FL (ref 8.1–13.5)
POTASSIUM SERPL-SCNC: 4.4 MMOL/L (ref 3.7–5.3)
RBC # BLD: 4.38 M/UL (ref 4.21–5.77)
RBC # BLD: ABNORMAL 10*6/UL
SEG NEUTROPHILS: 66 % (ref 36–65)
SEGMENTED NEUTROPHILS ABSOLUTE COUNT: 7.24 K/UL (ref 1.5–8.1)
SODIUM BLD-SCNC: 133 MMOL/L (ref 135–144)
WBC # BLD: 10.8 K/UL (ref 3.5–11.3)
WBC # BLD: ABNORMAL 10*3/UL

## 2021-09-06 PROCEDURE — 6360000002 HC RX W HCPCS: Performed by: STUDENT IN AN ORGANIZED HEALTH CARE EDUCATION/TRAINING PROGRAM

## 2021-09-06 PROCEDURE — 99232 SBSQ HOSP IP/OBS MODERATE 35: CPT | Performed by: INTERNAL MEDICINE

## 2021-09-06 PROCEDURE — 6370000000 HC RX 637 (ALT 250 FOR IP): Performed by: STUDENT IN AN ORGANIZED HEALTH CARE EDUCATION/TRAINING PROGRAM

## 2021-09-06 PROCEDURE — 2580000003 HC RX 258: Performed by: STUDENT IN AN ORGANIZED HEALTH CARE EDUCATION/TRAINING PROGRAM

## 2021-09-06 PROCEDURE — 2060000000 HC ICU INTERMEDIATE R&B

## 2021-09-06 PROCEDURE — 94640 AIRWAY INHALATION TREATMENT: CPT

## 2021-09-06 PROCEDURE — 85025 COMPLETE CBC W/AUTO DIFF WBC: CPT

## 2021-09-06 PROCEDURE — 2580000003 HC RX 258: Performed by: INTERNAL MEDICINE

## 2021-09-06 PROCEDURE — 80048 BASIC METABOLIC PNL TOTAL CA: CPT

## 2021-09-06 PROCEDURE — 82947 ASSAY GLUCOSE BLOOD QUANT: CPT

## 2021-09-06 PROCEDURE — 36415 COLL VENOUS BLD VENIPUNCTURE: CPT

## 2021-09-06 PROCEDURE — 6370000000 HC RX 637 (ALT 250 FOR IP)

## 2021-09-06 RX ADMIN — Medication 1 CAPSULE: at 08:29

## 2021-09-06 RX ADMIN — OXYCODONE HYDROCHLORIDE AND ACETAMINOPHEN 1 TABLET: 5; 325 TABLET ORAL at 08:35

## 2021-09-06 RX ADMIN — AMITRIPTYLINE HYDROCHLORIDE 75 MG: 50 TABLET, FILM COATED ORAL at 20:55

## 2021-09-06 RX ADMIN — INSULIN LISPRO 4 UNITS: 100 INJECTION, SOLUTION INTRAVENOUS; SUBCUTANEOUS at 12:49

## 2021-09-06 RX ADMIN — BUDESONIDE AND FORMOTEROL FUMARATE DIHYDRATE 2 PUFF: 160; 4.5 AEROSOL RESPIRATORY (INHALATION) at 20:36

## 2021-09-06 RX ADMIN — METOPROLOL TARTRATE 25 MG: 25 TABLET ORAL at 08:29

## 2021-09-06 RX ADMIN — SODIUM CHLORIDE, PRESERVATIVE FREE 10 ML: 5 INJECTION INTRAVENOUS at 08:24

## 2021-09-06 RX ADMIN — METOPROLOL TARTRATE 25 MG: 25 TABLET ORAL at 20:55

## 2021-09-06 RX ADMIN — INSULIN LISPRO 3 UNITS: 100 INJECTION, SOLUTION INTRAVENOUS; SUBCUTANEOUS at 20:49

## 2021-09-06 RX ADMIN — VANCOMYCIN HYDROCHLORIDE 1250 MG: 10 INJECTION, POWDER, LYOPHILIZED, FOR SOLUTION INTRAVENOUS at 10:24

## 2021-09-06 RX ADMIN — LACTULOSE 20 G: 20 SOLUTION ORAL at 08:29

## 2021-09-06 RX ADMIN — Medication 1 CAPSULE: at 20:55

## 2021-09-06 RX ADMIN — FAMOTIDINE 20 MG: 20 TABLET, FILM COATED ORAL at 20:55

## 2021-09-06 RX ADMIN — APIXABAN 5 MG: 5 TABLET, FILM COATED ORAL at 08:29

## 2021-09-06 RX ADMIN — ONDANSETRON 4 MG: 4 TABLET, ORALLY DISINTEGRATING ORAL at 22:48

## 2021-09-06 RX ADMIN — SIMETHICONE 80 MG: 80 TABLET, CHEWABLE ORAL at 12:06

## 2021-09-06 RX ADMIN — SIMETHICONE 80 MG: 80 TABLET, CHEWABLE ORAL at 18:56

## 2021-09-06 RX ADMIN — CEFEPIME 2000 MG: 2 INJECTION, POWDER, FOR SOLUTION INTRAVENOUS at 23:55

## 2021-09-06 RX ADMIN — SODIUM CHLORIDE, PRESERVATIVE FREE 10 ML: 5 INJECTION INTRAVENOUS at 20:58

## 2021-09-06 RX ADMIN — INSULIN LISPRO 2 UNITS: 100 INJECTION, SOLUTION INTRAVENOUS; SUBCUTANEOUS at 08:33

## 2021-09-06 RX ADMIN — FAMOTIDINE 20 MG: 20 TABLET, FILM COATED ORAL at 08:29

## 2021-09-06 RX ADMIN — GUAIFENESIN 600 MG: 600 TABLET, EXTENDED RELEASE ORAL at 20:55

## 2021-09-06 RX ADMIN — INSULIN GLARGINE 30 UNITS: 100 INJECTION, SOLUTION SUBCUTANEOUS at 17:32

## 2021-09-06 RX ADMIN — INSULIN LISPRO 4 UNITS: 100 INJECTION, SOLUTION INTRAVENOUS; SUBCUTANEOUS at 17:31

## 2021-09-06 RX ADMIN — CEFEPIME 2000 MG: 2 INJECTION, POWDER, FOR SOLUTION INTRAVENOUS at 12:04

## 2021-09-06 RX ADMIN — FERROUS SULFATE TAB EC 325 MG (65 MG FE EQUIVALENT) 325 MG: 325 (65 FE) TABLET DELAYED RESPONSE at 20:55

## 2021-09-06 RX ADMIN — APIXABAN 5 MG: 5 TABLET, FILM COATED ORAL at 20:55

## 2021-09-06 RX ADMIN — OXYCODONE HYDROCHLORIDE AND ACETAMINOPHEN 1 TABLET: 5; 325 TABLET ORAL at 20:55

## 2021-09-06 RX ADMIN — FERROUS SULFATE TAB EC 325 MG (65 MG FE EQUIVALENT) 325 MG: 325 (65 FE) TABLET DELAYED RESPONSE at 08:29

## 2021-09-06 RX ADMIN — MAGNESIUM HYDROXIDE 30 ML: 400 SUSPENSION ORAL at 08:29

## 2021-09-06 RX ADMIN — GUAIFENESIN 600 MG: 600 TABLET, EXTENDED RELEASE ORAL at 08:29

## 2021-09-06 RX ADMIN — TAMSULOSIN HYDROCHLORIDE 0.4 MG: 0.4 CAPSULE ORAL at 08:29

## 2021-09-06 ASSESSMENT — PAIN SCALES - GENERAL
PAINLEVEL_OUTOF10: 6
PAINLEVEL_OUTOF10: 0
PAINLEVEL_OUTOF10: 8
PAINLEVEL_OUTOF10: 8

## 2021-09-06 NOTE — PROGRESS NOTES
Patient is being verbally aggressive and abusive toward staff. Patient placed on bedpan multiple times throughout night and still unsatisfied.
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
## 2021-09-06 NOTE — PLAN OF CARE
Problem: Nutrition  Goal: Optimal nutrition therapy  9/6/2021 1842 by Patti Salinas RN  Outcome: Ongoing  9/6/2021 0456 by Malik Collazo RN  Outcome: Ongoing     Problem: Skin Integrity:  Goal: Will show no infection signs and symptoms  Description: Will show no infection signs and symptoms  9/6/2021 1842 by Patti Salinas RN  Outcome: Ongoing  9/6/2021 0456 by Malik Collazo RN  Outcome: Ongoing  Goal: Absence of new skin breakdown  Description: Absence of new skin breakdown  9/6/2021 1842 by Patti Salinas RN  Outcome: Ongoing  9/6/2021 0456 by Malik Collazo RN  Outcome: Ongoing     Problem: Falls - Risk of:  Goal: Will remain free from falls  Description: Will remain free from falls  9/6/2021 1842 by Patti Salinas RN  Outcome: Ongoing  9/6/2021 0456 by Malik Collazo RN  Outcome: Ongoing  Goal: Absence of physical injury  Description: Absence of physical injury  9/6/2021 1842 by Patti Salinas RN  Outcome: Ongoing  9/6/2021 0456 by Malik Collazo RN  Outcome: Ongoing     Problem: Pain:  Goal: Pain level will decrease  Description: Pain level will decrease  9/6/2021 1842 by Patti Salinas RN  Outcome: Ongoing  9/6/2021 0456 by Malik Collazo RN  Outcome: Ongoing  Goal: Control of acute pain  Description: Control of acute pain  9/6/2021 1842 by Patti Salinas RN  Outcome: Ongoing  9/6/2021 0456 by Malik Collazo RN  Outcome: Ongoing  Goal: Control of chronic pain  Description: Control of chronic pain  9/6/2021 1842 by Patti Salinas RN  Outcome: Ongoing  9/6/2021 0456 by Malik Collazo RN  Outcome: Ongoing

## 2021-09-06 NOTE — PROGRESS NOTES
[facility]  Internal Medicine Teaching Residency Program  Inpatient Daily Progress Note  ______________________________________________________________________________    Patient: Kevin Ceron  YOB: 1955   [id number]    Acct: [de-identified]     Room: [address] [place] [address]  Admit date: 8/29/2021  Today's date: 09/06/21  Number of days in the hospital: 8    SUBJECTIVE   Admitting Diagnosis: Pneumonia of right lower lobe due to infectious organism  CC: altered mental status  Pt examined at bedside. Chart & results reviewed. Vancomycin increased dose from 1g to 1250 mg as per pharmacy  POC glucose 180 today, patient hemodynamically stable, afebrile. Patient passed bowel movement yesterday after enema  ID recommend vancomycin and cefepime, stop date 09/09/21  Discharge planning in progress      ROS:  Constitutional:  negative for chills, fevers, sweats  Respiratory:  negative for cough, dyspnea on exertion, hemoptysis, shortness of breath, wheezing  Cardiovascular:  negative for chest pain, chest pressure/discomfort, lower extremity edema, palpitations  Gastrointestinal:  negative for abdominal pain, constipation, diarrhea, nausea, vomiting  Neurological:  negative for dizziness, headache  BRIEF HISTORY        A 65 y.o.  male was brought to the ED from nursing home as he was unresponsive this a.m.   He has PMH of DM (on insulin), COPD, esophageal cancer (s/p esophagectomy), bilateral below-knee amputation, PE (on Eliquis), HLD, MRSA sepsis. He was recently admitted (8/10/2021) for infection of left lower extremity stump. He was discharged on vancomycin and cefepime through PICC line for 6 weeks. On my evaluation, He is saturating well on 4 L NC, /48, HR 59.    He is sleepy, not responding to voice commands but arousable with deep sternal rub.   He c/o dull frontal headache, denies nausea, fever, chest pain, cough, shortness of breath, palpitation, abdominal pain, or bowel/bladder complaints. He goes back to sleep within seconds of waking up. Unable to obtain much history from him.  Will try to obtain more history from him once his mentation improves. Information is also obtained from patient's daughter and EMR. Patient's daughter was concerned about possible unintentional Ativan overdose at the nursing home.  In the ER, on arrival-he was drowsy, with no intelligible speech, withdrawing to pain with no purposeful movement.  Intubation was considered but he woke up while obtaining Covid swab. He is maintaining saturation well on 4 L NC but remains drowsy. Breath sounds reduced and rhonchi appreciated on the right side. Work-up in ED:  EKG-unremarkable  proBNP 390  Troponin 35  Hb 9.6  WBC 22.6,  CMP: Unremarkable except CO2 15, , albumin 2.9  Creat 1.05, BUN 34  ammonia 41  Lactic acid 1.7  VBG-pH 7.2, PO2 59.7, HCO3 17.6  CXR-bilateral interstitial and airspace disease-  asymmetric edema vs multifocal infection vs pneumonitis. CT chest PE-no acute pulmonary embolism, bilateral lower lobe scarring and rounded atelectasis, diffuse tree in bud nodularity throughout right lung-likely infectious inflammatory. CT head-no acute intracranial abnormality  TSH 0.29, thyroxine 0.85  UA unremarkable except trace ketones Hgb and +3 PU    OBJECTIVE     Vital Signs:  /79   Pulse 90   Temp 98.2 °F (36.8 °C) (Oral)   Resp 22   Ht 6' 1\" (1.854 m)   Wt 164 lb 10.9 oz (74.7 kg)   SpO2 94%   BMI 21.73 kg/m²     Temp (24hrs), Av.9 °F (36.6 °C), Min:97.5 °F (36.4 °C), Max:98.2 °F (36.8 °C)    In: -   Out: 550 [Urine:550]    Physical Exam:  Constitutional: This is a well developed, well nourished, 18.5-24.9 - Normal 63y.o. year old male who is alert, oriented, cooperative and in no apparent distress. Head:normocephalic and atraumatic. EENT:  PERRLA. No conjunctival injections.    Septum was midline, mucosa was without erythema, exudates or cobblestoning. No thrush was noted. Neck: Supple without thyromegaly. No elevated JVP. Trachea was midline. Respiratory: Chest was symmetrical without dullness to percussion. Breath sounds bilaterally were clear to auscultation. There were no wheezes, rhonchi or rales. There is no intercostal retraction or use of accessory muscles. No egophony noted. Cardiovascular: Regular without murmur, clicks, gallops or rubs. Abdomen: Slightly rounded and soft without organomegaly. No rebound, rigidity or guarding was appreciated. Lymphatic: No lymphadenopathy. Musculoskeletal: Normal curvature of the spine. No gross muscle weakness. Extremities:  No lower extremity edema, ulcerations, tenderness, varicosities or erythema. Muscle size, tone and strength are normal.  No involuntary movements are noted. Skin:  Warm and dry. Good color, turgor and pigmentation. No lesions or scars.   No cyanosis or clubbing  Neurological/Psychiatric: The patient's general behavior, level of consciousness, thought content and emotional status is normal.        Medications:  Scheduled Medications:    lactulose  20 g Oral TID    vancomycin  1,250 mg IntraVENous Q24H    insulin glargine  30 Units SubCUTAneous Dinner    magnesium hydroxide  30 mL Oral BID    sodium chloride flush  5-40 mL IntraVENous 2 times per day    metoprolol tartrate  25 mg Oral BID    guaiFENesin  600 mg Oral BID    famotidine  20 mg Oral BID    vancomycin (VANCOCIN) intermittent dosing (placeholder)   Other RX Placeholder    amitriptyline  75 mg Oral Nightly    apixaban  5 mg Oral BID    budesonide-formoterol  2 puff Inhalation BID    ferrous sulfate  325 mg Oral BID    lactobacillus  1 capsule Oral BID    tamsulosin  0.4 mg Oral Daily    sodium chloride flush  5-40 mL IntraVENous 2 times per day    cefepime  2,000 mg IntraVENous Q12H    insulin lispro  0-12 Units SubCUTAneous TID     insulin lispro  0-6 Units SubCUTAneous Nightly     Continuous Infusions:    sodium chloride      sodium chloride      dextrose       PRN Medicationscalcium carbonate, 500 mg, BID PRN  sodium chloride flush, 5-40 mL, PRN  sodium chloride, 25 mL, PRN  oxyCODONE-acetaminophen, 1 tablet, Q8H PRN  simethicone, 80 mg, Q6H PRN  albuterol sulfate HFA, 2 puff, Q6H PRN  ipratropium-albuterol, 3 mL, Q4H PRN  sodium chloride flush, 5-40 mL, PRN  sodium chloride, 25 mL, PRN  ondansetron, 4 mg, Q8H PRN   Or  ondansetron, 4 mg, Q6H PRN  acetaminophen, 650 mg, Q6H PRN   Or  acetaminophen, 650 mg, Q6H PRN  glucose, 15 g, PRN  dextrose, 12.5 g, PRN  glucagon (rDNA), 1 mg, PRN  dextrose, 100 mL/hr, PRN        Diagnostic Labs:  CBC:   Recent Labs     09/04/21  0541 09/05/21  0511 09/06/21  0533   WBC 9.8 10.9 10.8   RBC 4.23 4.40 4.38   HGB 11.2* 11.3* 11.4*   HCT 36.6* 37.9* 38.0*   MCV 86.5 86.1 86.8   RDW 16.5* 16.2* 16.2*    355 361     BMP:   Recent Labs     09/04/21  0541 09/05/21  0511 09/06/21  0533    131* 133*   K 3.9 3.8 4.4    98 99   CO2 22 24 22   BUN 19 15 20   CREATININE 0.93 0.84 0.83     BNP: No results for input(s): BNP in the last 72 hours. PT/INR: No results for input(s): PROTIME, INR in the last 72 hours. APTT:   Recent Labs     09/03/21  1354   APTT 24.7     CARDIAC ENZYMES: No results for input(s): CKMB, CKMBINDEX, TROPONINI in the last 72 hours. Invalid input(s): CKTOTAL;3  FASTING LIPID PANEL:  Lab Results   Component Value Date    CHOL 174 06/24/2018    HDL 49 06/24/2018    TRIG 175 (H) 06/24/2018     LIVER PROFILE: No results for input(s): AST, ALT, ALB, BILIDIR, BILITOT, ALKPHOS in the last 72 hours. MICROBIOLOGY:   Lab Results   Component Value Date/Time    CULTURE NO GROWTH 6 DAYS 08/29/2021 11:45 AM       Imaging:    XR ABDOMEN (KUB) (SINGLE AP VIEW)    Result Date: 9/4/2021  Nonspecific, nonobstructive bowel gas pattern.      XR CHEST PORTABLE    Result Date: 9/2/2021  New right PICC line terminates at the superior caval atrial junction. XR CHEST PORTABLE    Result Date: 9/2/2021  1. Position of right PICC line remains unchanged with the tip at the level of the right subclavian vein. 2.  Stable exam of the chest.     FL MODIFIED BARIUM SWALLOW W VIDEO    Result Date: 8/30/2021  No penetration or aspiration with the above administered substances. Please see separate speech pathology report for full discussion of findings and recommendations. ASSESSMENT & PLAN     ASSESSMENT / PLAN:     Principal Problem:    Pneumonia of right lower lobe due to infectious organism  Active Problems:    Type 2 diabetes mellitus with diabetic polyneuropathy, with long-term current use of insulin (HCC)    Esophageal cancer (HCC)    COPD without exacerbation (HCC)    HLD (hyperlipidemia)    PAD (peripheral artery disease) (HCC)    S/P BKA (below knee amputation) bilateral (HCC)    Moderate malnutrition ([address]    History of below knee amputation, right ([address]    History of below knee amputation, left ([address]    Metabolic encephalopathy    Altered mental status  Resolved Problems:    * No resolved hospital problems. *    Acute alteration in mental status: Resolved  · UA/UDS-unremarkable  · CT head-no acute intracranial abnormality.     Aspiration pneumonia/ previous osteomyelitis of left BKA, wound culture positive for MRSA and gram negative rods  · History of esophagectomy.    · CXR and CT chest shows bilateral interstitial and airspace disease with new diffuse tree-in-bud nodularity in the right lung-pneumonia picture, possibly aspiration.   · Respiratory panel, molecular-remarkable  · On vancomycin 1250 g q24 h as per pharmacy and cefepime 2 g q12h end date - 09/09/21  · Continue Mucinex  · Spirometry     Diabetes mellitus  · Increased lantus to 30 U  · On medium dose sliding scale     Hypertension  · on Lopresor 25 mg qd po     Hypokalemia - resolved, K 4.4 today  - will monitor BMP     Constipation - improved  - lactulose 20 g tid  - d/c naloxegol 25 mg qd    DVT ppx : eliquis  GI ppx: famotidine    PT/OT: pt/ot on board  Discharge Planning / SW: patient submitted an appeal with medicare for his discharge,  on board, discharge planning in progress    Sree Lizarraga MD  Internal Medicine Resident, PGY-1  [facility];  [place], [place]  9/6/2021, 8:03 AM

## 2021-09-06 NOTE — CARE COORDINATION
Per [employer] site patients Appeal was denied. Updated physicians, asked for scripts for IV medications so I can start discharge process. Spoke to patient to inform him of appeal being denied. Asked about HC choice, he states he will choose tomorrow. I explained that he was discharged and I need a choice ASAP so he can go home tomorrow. He informs me to call his daughter Yamileth Barth. Matthew Lewis she tells me any [facility] agency is fine as long as he goes home. Referrals sent to [facility], [address] [place] [address] [facility]    [facility] have declined admission    56 Per ID Dr. Kalpesh Joe patient can have antibiotics stopped at time of discharge. Perfect served Dr. Garth De updated on plan. Meds need reconciled.

## 2021-09-06 NOTE — PLAN OF CARE
Problem: Breathing Pattern - Ineffective:  Goal: Ability to achieve and maintain a regular respiratory rate will improve  Description: Ability to achieve and maintain a regular respiratory rate will improve  Outcome: Ongoing     Problem: Nutrition  Goal: Optimal nutrition therapy  Outcome: Ongoing     Problem: Skin Integrity:  Goal: Will show no infection signs and symptoms  Description: Will show no infection signs and symptoms  Outcome: Ongoing     Problem: Skin Integrity:  Goal: Absence of new skin breakdown  Description: Absence of new skin breakdown  Outcome: Ongoing     Problem: Falls - Risk of:  Goal: Will remain free from falls  Description: Will remain free from falls  Outcome: Ongoing     Problem: Falls - Risk of:  Goal: Absence of physical injury  Description: Absence of physical injury  Outcome: Ongoing     Problem: Pain:  Goal: Pain level will decrease  Description: Pain level will decrease  Outcome: Ongoing     Problem: Pain:  Goal: Control of acute pain  Description: Control of acute pain  Outcome: Ongoing     Problem: Pain:  Goal: Control of chronic pain  Description: Control of chronic pain  Outcome: Ongoing

## 2021-09-07 VITALS
SYSTOLIC BLOOD PRESSURE: 132 MMHG | OXYGEN SATURATION: 98 % | RESPIRATION RATE: 16 BRPM | WEIGHT: 164.68 LBS | DIASTOLIC BLOOD PRESSURE: 79 MMHG | HEIGHT: 73 IN | TEMPERATURE: 97.5 F | BODY MASS INDEX: 21.83 KG/M2 | HEART RATE: 85 BPM

## 2021-09-07 LAB
ABSOLUTE EOS #: 0.49 K/UL (ref 0–0.44)
ABSOLUTE IMMATURE GRANULOCYTE: 0.05 K/UL (ref 0–0.3)
ABSOLUTE LYMPH #: 2.16 K/UL (ref 1.1–3.7)
ABSOLUTE MONO #: 1.13 K/UL (ref 0.1–1.2)
ANION GAP SERPL CALCULATED.3IONS-SCNC: 12 MMOL/L (ref 9–17)
BASOPHILS # BLD: 1 % (ref 0–2)
BASOPHILS ABSOLUTE: 0.11 K/UL (ref 0–0.2)
BUN BLDV-MCNC: 22 MG/DL (ref 8–23)
BUN/CREAT BLD: ABNORMAL (ref 9–20)
CALCIUM SERPL-MCNC: 9 MG/DL (ref 8.6–10.4)
CHLORIDE BLD-SCNC: 100 MMOL/L (ref 98–107)
CO2: 26 MMOL/L (ref 20–31)
CREAT SERPL-MCNC: 1.1 MG/DL (ref 0.7–1.2)
DIFFERENTIAL TYPE: ABNORMAL
EOSINOPHILS RELATIVE PERCENT: 5 % (ref 1–4)
GFR AFRICAN AMERICAN: >60 ML/MIN
GFR NON-AFRICAN AMERICAN: >60 ML/MIN
GFR SERPL CREATININE-BSD FRML MDRD: ABNORMAL ML/MIN/{1.73_M2}
GFR SERPL CREATININE-BSD FRML MDRD: ABNORMAL ML/MIN/{1.73_M2}
GLUCOSE BLD-MCNC: 189 MG/DL (ref 75–110)
GLUCOSE BLD-MCNC: 219 MG/DL (ref 70–99)
GLUCOSE BLD-MCNC: 242 MG/DL (ref 75–110)
GLUCOSE BLD-MCNC: 275 MG/DL (ref 75–110)
GLUCOSE BLD-MCNC: 278 MG/DL (ref 75–110)
HCT VFR BLD CALC: 39 % (ref 40.7–50.3)
HEMOGLOBIN: 11.4 G/DL (ref 13–17)
IMMATURE GRANULOCYTES: 1 %
LYMPHOCYTES # BLD: 22 % (ref 24–43)
MCH RBC QN AUTO: 25.9 PG (ref 25.2–33.5)
MCHC RBC AUTO-ENTMCNC: 29.2 G/DL (ref 28.4–34.8)
MCV RBC AUTO: 88.6 FL (ref 82.6–102.9)
MONOCYTES # BLD: 12 % (ref 3–12)
NRBC AUTOMATED: 0 PER 100 WBC
PDW BLD-RTO: 16 % (ref 11.8–14.4)
PLATELET # BLD: 347 K/UL (ref 138–453)
PLATELET ESTIMATE: ABNORMAL
PMV BLD AUTO: 10.1 FL (ref 8.1–13.5)
POTASSIUM SERPL-SCNC: 4.6 MMOL/L (ref 3.7–5.3)
RBC # BLD: 4.4 M/UL (ref 4.21–5.77)
RBC # BLD: ABNORMAL 10*6/UL
SEG NEUTROPHILS: 59 % (ref 36–65)
SEGMENTED NEUTROPHILS ABSOLUTE COUNT: 5.74 K/UL (ref 1.5–8.1)
SODIUM BLD-SCNC: 138 MMOL/L (ref 135–144)
WBC # BLD: 9.7 K/UL (ref 3.5–11.3)
WBC # BLD: ABNORMAL 10*3/UL

## 2021-09-07 PROCEDURE — 2580000003 HC RX 258: Performed by: INTERNAL MEDICINE

## 2021-09-07 PROCEDURE — 85025 COMPLETE CBC W/AUTO DIFF WBC: CPT

## 2021-09-07 PROCEDURE — 36415 COLL VENOUS BLD VENIPUNCTURE: CPT

## 2021-09-07 PROCEDURE — 99232 SBSQ HOSP IP/OBS MODERATE 35: CPT | Performed by: INTERNAL MEDICINE

## 2021-09-07 PROCEDURE — 82947 ASSAY GLUCOSE BLOOD QUANT: CPT

## 2021-09-07 PROCEDURE — 6370000000 HC RX 637 (ALT 250 FOR IP)

## 2021-09-07 PROCEDURE — 6370000000 HC RX 637 (ALT 250 FOR IP): Performed by: STUDENT IN AN ORGANIZED HEALTH CARE EDUCATION/TRAINING PROGRAM

## 2021-09-07 PROCEDURE — 94640 AIRWAY INHALATION TREATMENT: CPT

## 2021-09-07 PROCEDURE — 6360000002 HC RX W HCPCS: Performed by: STUDENT IN AN ORGANIZED HEALTH CARE EDUCATION/TRAINING PROGRAM

## 2021-09-07 PROCEDURE — 2060000000 HC ICU INTERMEDIATE R&B

## 2021-09-07 PROCEDURE — 80048 BASIC METABOLIC PNL TOTAL CA: CPT

## 2021-09-07 PROCEDURE — 99239 HOSP IP/OBS DSCHRG MGMT >30: CPT | Performed by: INTERNAL MEDICINE

## 2021-09-07 PROCEDURE — 2580000003 HC RX 258: Performed by: STUDENT IN AN ORGANIZED HEALTH CARE EDUCATION/TRAINING PROGRAM

## 2021-09-07 RX ORDER — GUAIFENESIN 600 MG/1
600 TABLET, EXTENDED RELEASE ORAL 2 TIMES DAILY
Qty: 20 TABLET | Refills: 0 | Status: SHIPPED | OUTPATIENT
Start: 2021-09-07 | End: 2021-09-17

## 2021-09-07 RX ADMIN — APIXABAN 5 MG: 5 TABLET, FILM COATED ORAL at 20:02

## 2021-09-07 RX ADMIN — BUDESONIDE AND FORMOTEROL FUMARATE DIHYDRATE 2 PUFF: 160; 4.5 AEROSOL RESPIRATORY (INHALATION) at 09:39

## 2021-09-07 RX ADMIN — SODIUM CHLORIDE, PRESERVATIVE FREE 10 ML: 5 INJECTION INTRAVENOUS at 20:02

## 2021-09-07 RX ADMIN — INSULIN GLARGINE 30 UNITS: 100 INJECTION, SOLUTION SUBCUTANEOUS at 17:43

## 2021-09-07 RX ADMIN — METOPROLOL TARTRATE 25 MG: 25 TABLET ORAL at 08:43

## 2021-09-07 RX ADMIN — Medication 1 CAPSULE: at 20:02

## 2021-09-07 RX ADMIN — FAMOTIDINE 20 MG: 20 TABLET, FILM COATED ORAL at 08:43

## 2021-09-07 RX ADMIN — INSULIN LISPRO 2 UNITS: 100 INJECTION, SOLUTION INTRAVENOUS; SUBCUTANEOUS at 08:43

## 2021-09-07 RX ADMIN — INSULIN LISPRO 6 UNITS: 100 INJECTION, SOLUTION INTRAVENOUS; SUBCUTANEOUS at 17:44

## 2021-09-07 RX ADMIN — METOPROLOL TARTRATE 25 MG: 25 TABLET ORAL at 20:02

## 2021-09-07 RX ADMIN — INSULIN LISPRO 4 UNITS: 100 INJECTION, SOLUTION INTRAVENOUS; SUBCUTANEOUS at 12:05

## 2021-09-07 RX ADMIN — FERROUS SULFATE TAB EC 325 MG (65 MG FE EQUIVALENT) 325 MG: 325 (65 FE) TABLET DELAYED RESPONSE at 08:42

## 2021-09-07 RX ADMIN — GUAIFENESIN 600 MG: 600 TABLET, EXTENDED RELEASE ORAL at 20:02

## 2021-09-07 RX ADMIN — OXYCODONE HYDROCHLORIDE AND ACETAMINOPHEN 1 TABLET: 5; 325 TABLET ORAL at 20:06

## 2021-09-07 RX ADMIN — TAMSULOSIN HYDROCHLORIDE 0.4 MG: 0.4 CAPSULE ORAL at 08:43

## 2021-09-07 RX ADMIN — Medication 1 CAPSULE: at 08:43

## 2021-09-07 RX ADMIN — GUAIFENESIN 600 MG: 600 TABLET, EXTENDED RELEASE ORAL at 08:42

## 2021-09-07 RX ADMIN — ONDANSETRON 4 MG: 4 TABLET, ORALLY DISINTEGRATING ORAL at 12:05

## 2021-09-07 RX ADMIN — INSULIN LISPRO 3 UNITS: 100 INJECTION, SOLUTION INTRAVENOUS; SUBCUTANEOUS at 20:23

## 2021-09-07 RX ADMIN — FERROUS SULFATE TAB EC 325 MG (65 MG FE EQUIVALENT) 325 MG: 325 (65 FE) TABLET DELAYED RESPONSE at 20:02

## 2021-09-07 RX ADMIN — CEFEPIME 2000 MG: 2 INJECTION, POWDER, FOR SOLUTION INTRAVENOUS at 08:43

## 2021-09-07 RX ADMIN — VANCOMYCIN HYDROCHLORIDE 1250 MG: 10 INJECTION, POWDER, LYOPHILIZED, FOR SOLUTION INTRAVENOUS at 05:46

## 2021-09-07 RX ADMIN — FAMOTIDINE 20 MG: 20 TABLET, FILM COATED ORAL at 20:02

## 2021-09-07 RX ADMIN — LACTULOSE 20 G: 20 SOLUTION ORAL at 14:53

## 2021-09-07 RX ADMIN — ONDANSETRON 4 MG: 4 TABLET, ORALLY DISINTEGRATING ORAL at 19:59

## 2021-09-07 RX ADMIN — APIXABAN 5 MG: 5 TABLET, FILM COATED ORAL at 08:43

## 2021-09-07 RX ADMIN — AMITRIPTYLINE HYDROCHLORIDE 75 MG: 50 TABLET, FILM COATED ORAL at 20:02

## 2021-09-07 ASSESSMENT — PAIN SCALES - GENERAL: PAINLEVEL_OUTOF10: 8

## 2021-09-07 NOTE — PROGRESS NOTES
Comprehensive Nutrition Assessment    Type and Reason for Visit:  Reassess    Nutrition Recommendations/Plan:    - Liberalize diet to allow for 60 gm CHO per meal to better meet estimated nutrient needs. - D/c Ensure Clear. Try Ensure High Protein TID. Nutrition Assessment:  Pt reports improved appetite since previous visit. Consumed ~50% of breakfast this morning, but pt states he is consuming greater than 75% of most meals. Dislikes Ensure Clear, requests changing back to standard Ensure. Reports abd pain - worse after eating. Reports he has not had a BM x 2 days and previously went 6 days without BM (required enema). Per EHR, pt's last BM yesterday. Denies any nausea or vomiting at this time. Me    Malnutrition Assessment:  Malnutrition Status: Moderate malnutrition    Context:  Social/Environmental Circumstances     Findings of the 6 clinical characteristics of malnutrition:  Energy Intake:  Mild decrease in energy intake (Comment)  Weight Loss:  Unable to assess     Body Fat Loss:  1 - Mild body fat loss Orbital   Muscle Mass Loss:  1 - Mild muscle mass loss Temples (temporalis), Clavicles (pectoralis & deltoids)  Fluid Accumulation:  No significant fluid accumulation     Strength:  Not Performed    Estimated Daily Nutrient Needs:  Energy (kcal):  8482-7979 kcal/d (25-27 kcal/kg); Weight Used for Energy Requirements:  Adjusted (84 kg)     Protein (g):  100-110 g protein/d (1.2-1.3 g/kg);  Weight Used for Protein Requirements:  Adjusted (84 kg)        Fluid (ml/day):  3307-7429 mL fluid/d or per MD; Method Used for Fluid Requirements:  ml/Kg (25-30 mL)      Nutrition Related Findings:  Meds/labs reviewed      Wounds:  None       Current Nutrition Therapies:    ADULT DIET; Easy to Chew; 3 carb choices (45 gm/meal)  Adult Oral Nutrition Supplement; Clear liquid supplement    Anthropometric Measures:  · Height: 6' 1\" (185.4 cm)  · Current Body Weight: 164 lb 10.9 oz (74.7 kg)   · Admission Body Weight: 144 lb (65.3 kg)    · Ideal Body Weight: 184 lbs; % Ideal Body Weight 89.5 %   · BMI: 21.7  · Adjusted Body Weight:  Amputation   · Adjusted BMI: 24.3    · BMI Categories: Normal Weight (BMI 18.5-24. 9)       Nutrition Diagnosis:   · Inadequate oral intake related to pain (poor appetite) as evidenced by poor intake prior to admission (; need for ONS, improving PO intake)      Nutrition Interventions:   Food and/or Nutrient Delivery:  Modify Current Diet, Modify Oral Nutrition Supplement  Nutrition Education/Counseling:  No recommendation at this time   Coordination of Nutrition Care:  Continue to monitor while inpatient    Goals:  Meet greater than 50% of estimated nutrient needs       Nutrition Monitoring and Evaluation:   Behavioral-Environmental Outcomes:  None Identified   Food/Nutrient Intake Outcomes:  Food and Nutrient Intake, Supplement Intake  Physical Signs/Symptoms Outcomes:  Weight, Biochemical Data, Nutrition Focused Physical Findings, GI Status     Discharge Planning:     Too soon to determine     Electronically signed by Denice Rodríguez MS, RD, LD on 9/7/21 at 1:35 PM EDT    Contact: [phone]

## 2021-09-07 NOTE — PLAN OF CARE
Problem: Breathing Pattern - Ineffective:  Goal: Ability to achieve and maintain a regular respiratory rate will improve  Description: Ability to achieve and maintain a regular respiratory rate will improve  Outcome: Ongoing     Problem: Nutrition  Goal: Optimal nutrition therapy  Outcome: Ongoing     Problem: Skin Integrity:  Goal: Will show no infection signs and symptoms  Description: Will show no infection signs and symptoms  Outcome: Ongoing  Goal: Absence of new skin breakdown  Description: Absence of new skin breakdown  Outcome: Ongoing  Goal: Demonstration of wound healing without infection will improve  Description: Demonstration of wound healing without infection will improve  Outcome: Ongoing  Goal: Complications related to intravenous access or infusion will be avoided or minimized  Description: Complications related to intravenous access or infusion will be avoided or minimized  Outcome: Ongoing     Problem: Falls - Risk of:  Goal: Will remain free from falls  Description: Will remain free from falls  Outcome: Ongoing  Goal: Absence of physical injury  Description: Absence of physical injury  Outcome: Ongoing     Problem: Pain:  Goal: Pain level will decrease  Description: Pain level will decrease  Outcome: Ongoing  Goal: Control of acute pain  Description: Control of acute pain  Outcome: Ongoing  Goal: Control of chronic pain  Description: Control of chronic pain  Outcome: Ongoing     Problem: Nutritional:  Goal: Nutritional status will improve  Description: Nutritional status will improve  Outcome: Ongoing     Problem: Physical Regulation:  Goal: Diagnostic test results will improve  Description: Diagnostic test results will improve  Outcome: Ongoing  Goal: Will remain free from infection  Description: Will remain free from infection  Outcome: Ongoing  Goal: Ability to maintain vital signs within normal range will improve  Description: Ability to maintain vital signs within normal range will improve  Outcome: Ongoing     Problem: Respiratory:  Goal: Ability to maintain normal respiratory secretions will improve  Description: Ability to maintain normal respiratory secretions will improve  Outcome: Ongoing

## 2021-09-07 NOTE — CARE COORDINATION
Spoke to patient at bedside to discuss transition plan. He states that he will not be going home on ABX. Asked if he was still interested in [facility]. he tells me there is no need now. Called Daughter to inform her of discharge.   Left voicemail, awaiting return call

## 2021-09-07 NOTE — PLAN OF CARE
Problem: Breathing Pattern - Ineffective:  Goal: Ability to achieve and maintain a regular respiratory rate will improve  Description: Ability to achieve and maintain a regular respiratory rate will improve  9/7/2021 0231 by Zaki Bhatia RN  Outcome: Ongoing     Problem: Nutrition  Goal: Optimal nutrition therapy  9/7/2021 0231 by Zaki Bhatia RN  Outcome: Ongoing     Problem: Skin Integrity:  Goal: Will show no infection signs and symptoms  Description: Will show no infection signs and symptoms  9/7/2021 0231 by Zaki Bhatia RN  Outcome: Ongoing     Problem: Skin Integrity:  Goal: Absence of new skin breakdown  Description: Absence of new skin breakdown  9/7/2021 0231 by Zaki Bhatia RN  Outcome: Met This Shift     Problem: Skin Integrity:  Goal: Demonstration of wound healing without infection will improve  Description: Demonstration of wound healing without infection will improve  Outcome: Ongoing     Problem: Skin Integrity:  Goal: Complications related to intravenous access or infusion will be avoided or minimized  Description: Complications related to intravenous access or infusion will be avoided or minimized  Outcome: Ongoing     Problem: Falls - Risk of:  Goal: Will remain free from falls  Description: Will remain free from falls  9/7/2021 0231 by Zaki Bhatia RN  Outcome: Met This Shift     Problem: Falls - Risk of:  Goal: Absence of physical injury  Description: Absence of physical injury  9/7/2021 0231 by Zaki Bhatia RN  Outcome: Met This Shift     Problem: Pain:  Goal: Pain level will decrease  Description: Pain level will decrease  9/7/2021 0231 by Zaki Bhatia RN  Outcome: Ongoing     Problem: Pain:  Goal: Control of acute pain  Description: Control of acute pain  9/7/2021 0231 by Zaki Bhatia RN  Outcome: Ongoing     Problem: Nutritional:  Goal: Nutritional status will improve  Description: Nutritional status will improve  Outcome: Ongoing     Problem: Physical Regulation:  Goal: Diagnostic test
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
## 2021-09-07 NOTE — PROGRESS NOTES
[facility]  Internal Medicine Teaching Residency Program  Inpatient Daily Progress Note  ______________________________________________________________________________    Patient: Kevin Ceron  YOB: 1955   [place]    Acct: [de-identified]     Room: [facility]  Admit date: 2021  Today's date: 21  Number of days in the hospital: 9    SUBJECTIVE   Admitting Diagnosis: Pneumonia of right lower lobe due to infectious organism  CC: altered mental status  Pt examined at bedside. Chart & results reviewed. Patient hemodynamically stable, afebrile  Patient will be getting discharged today. Glucose     ROS:  Constitutional:  negative for chills, fevers, sweats  Respiratory:  negative for cough, dyspnea on exertion, hemoptysis, shortness of breath, wheezing  Cardiovascular:  negative for chest pain, chest pressure/discomfort, lower extremity edema, palpitations  Gastrointestinal:  negative for abdominal pain, constipation, diarrhea, nausea, vomiting  Neurological:  negative for dizziness, headache  BRIEF HISTORY     He has PMH of DM (on insulin), COPD, esophageal cancer (s/p esophagectomy), bilateral below-knee amputation, PE (on Eliquis), HLD, MRSA sepsis. He was recently admitted (8/10/2021) for infection of left lower extremity stump. He was discharged on vancomycin and cefepime through PICC line for 6 weeks. On my evaluation, He is saturating well on 4 L NC, /48, HR 59. He is sleepy, not responding to voice commands but arousable with deep sternal rub.   He c/o dull frontal headache, denies nausea, fever, chest pain, cough, shortness of breath, palpitation, abdominal pain, or bowel/bladder complaints.   He goes back to sleep within seconds of waking up. Unable to obtain much history from him.  Will try to obtain more history from him once his mentation improves. Information is also obtained from patient's daughter and EMR.  Patient's daughter was concerned about possible unintentional Ativan overdose at the nursing home.  In the ER, on arrival-he was drowsy, with no intelligible speech, withdrawing to pain with no purposeful movement.  Intubation was considered but he woke up while obtaining Covid swab. He is maintaining saturation well on 4 L NC but remains drowsy. Breath sounds reduced and rhonchi appreciated on the right side. Work-up in ED:  EKG-unremarkable  proBNP 390  Troponin 35  Hb 9.6  WBC 22.6,  CMP: Unremarkable except CO2 15, , albumin 2.9  Creat 1.05, BUN 34  ammonia 41  Lactic acid 1.7  VBG-pH 7.2, PO2 59.7, HCO3 17.6  CXR-bilateral interstitial and airspace disease-  asymmetric edema vs multifocal infection vs pneumonitis. CT chest PE-no acute pulmonary embolism, bilateral lower lobe scarring and rounded atelectasis, diffuse tree in bud nodularity throughout right lung-likely infectious inflammatory. CT head-no acute intracranial abnormality  TSH 0.29, thyroxine 0.85  UA unremarkable except trace ketones Hgb and +3 PU       OBJECTIVE     Vital Signs:  /69   Pulse 80   Temp 97 °F (36.1 °C)   Resp 16   Ht 6' 1\" (1.854 m)   Wt 164 lb 10.9 oz (74.7 kg)   SpO2 98%   BMI 21.73 kg/m²     Temp (24hrs), Av.7 °F (36.5 °C), Min:97 °F (36.1 °C), Max:98.2 °F (36.8 °C)    In: 550   Out: 1425 [Urine:1425]    Physical Exam:  Constitutional: This is a well developed, well nourished, 18.5-24.9 - Normal 63y.o. year old male who is alert, oriented, cooperative and in no apparent distress. Head:normocephalic and atraumatic. EENT:  PERRLA. No conjunctival injections. Septum was midline, mucosa was without erythema, exudates or cobblestoning. No thrush was noted. Neck: Supple without thyromegaly. No elevated JVP. Trachea was midline. Respiratory: Chest was symmetrical without dullness to percussion. Breath sounds bilaterally were clear to auscultation. There were no wheezes, rhonchi or rales. There is no intercostal retraction or use of accessory muscles. No egophony noted. Cardiovascular: Regular without murmur, clicks, gallops or rubs. Abdomen: Slightly rounded and soft without organomegaly. No rebound, rigidity or guarding was appreciated. Lymphatic: No lymphadenopathy. Musculoskeletal: Normal curvature of the spine. No gross muscle weakness. Extremities:  No lower extremity edema, ulcerations, tenderness, varicosities or erythema. Muscle size, tone and strength are normal.  No involuntary movements are noted. Skin:  Warm and dry. Good color, turgor and pigmentation. No lesions or scars.   No cyanosis or clubbing  Neurological/Psychiatric: The patient's general behavior, level of consciousness, thought content and emotional status is normal.        Medications:  Scheduled Medications:    lactulose  20 g Oral TID    vancomycin  1,250 mg IntraVENous Q24H    insulin glargine  30 Units SubCUTAneous Dinner    magnesium hydroxide  30 mL Oral BID    sodium chloride flush  5-40 mL IntraVENous 2 times per day    metoprolol tartrate  25 mg Oral BID    guaiFENesin  600 mg Oral BID    famotidine  20 mg Oral BID    vancomycin (VANCOCIN) intermittent dosing (placeholder)   Other RX Placeholder    amitriptyline  75 mg Oral Nightly    apixaban  5 mg Oral BID    budesonide-formoterol  2 puff Inhalation BID    ferrous sulfate  325 mg Oral BID    lactobacillus  1 capsule Oral BID    tamsulosin  0.4 mg Oral Daily    sodium chloride flush  5-40 mL IntraVENous 2 times per day    cefepime  2,000 mg IntraVENous Q12H    insulin lispro  0-12 Units SubCUTAneous TID WC    insulin lispro  0-6 Units SubCUTAneous Nightly     Continuous Infusions:    sodium chloride      sodium chloride      dextrose       PRN Medicationscalcium carbonate, 500 mg, BID PRN  sodium chloride flush, 5-40 mL, PRN  sodium chloride, 25 mL, PRN  oxyCODONE-acetaminophen, 1 tablet, Q8H PRN  simethicone, 80 mg, Q6H PRN  albuterol sulfate HFA, 2 puff, Q6H PRN  ipratropium-albuterol, 3 mL, Q4H PRN  sodium chloride flush, 5-40 mL, PRN  sodium chloride, 25 mL, PRN  ondansetron, 4 mg, Q8H PRN   Or  ondansetron, 4 mg, Q6H PRN  acetaminophen, 650 mg, Q6H PRN   Or  acetaminophen, 650 mg, Q6H PRN  glucose, 15 g, PRN  dextrose, 12.5 g, PRN  glucagon (rDNA), 1 mg, PRN  dextrose, 100 mL/hr, PRN        Diagnostic Labs:  CBC:   Recent Labs     09/04/21  0541 09/05/21  0511 09/06/21  0533   WBC 9.8 10.9 10.8   RBC 4.23 4.40 4.38   HGB 11.2* 11.3* 11.4*   HCT 36.6* 37.9* 38.0*   MCV 86.5 86.1 86.8   RDW 16.5* 16.2* 16.2*    355 361     BMP:   Recent Labs     09/04/21  0541 09/05/21  0511 09/06/21  0533    131* 133*   K 3.9 3.8 4.4    98 99   CO2 22 24 22   BUN 19 15 20   CREATININE 0.93 0.84 0.83     BNP: No results for input(s): BNP in the last 72 hours. PT/INR: No results for input(s): PROTIME, INR in the last 72 hours. APTT: No results for input(s): APTT in the last 72 hours. CARDIAC ENZYMES: No results for input(s): CKMB, CKMBINDEX, TROPONINI in the last 72 hours. Invalid input(s): CKTOTAL;3  FASTING LIPID PANEL:  Lab Results   Component Value Date    CHOL 174 06/24/2018    HDL 49 06/24/2018    TRIG 175 (H) 06/24/2018     LIVER PROFILE: No results for input(s): AST, ALT, ALB, BILIDIR, BILITOT, ALKPHOS in the last 72 hours. MICROBIOLOGY:   Lab Results   Component Value Date/Time    CULTURE NO GROWTH 6 DAYS 08/29/2021 11:45 AM       Imaging:    XR ABDOMEN (KUB) (SINGLE AP VIEW)    Result Date: 9/4/2021  Nonspecific, nonobstructive bowel gas pattern. XR CHEST PORTABLE    Result Date: 9/2/2021  New right PICC line terminates at the superior caval atrial junction. XR CHEST PORTABLE    Result Date: 9/2/2021  1. Position of right PICC line remains unchanged with the tip at the level of the right subclavian vein.  2.  Stable exam of the chest.       ASSESSMENT & PLAN     ASSESSMENT / PLAN:     Principal Problem:    Pneumonia of right lower lobe due to infectious organism  Active Problems:    Type 2 diabetes mellitus with diabetic polyneuropathy, with long-term current use of insulin (HCC)    Esophageal cancer (HCC)    COPD without exacerbation (HCC)    HLD (hyperlipidemia)    PAD (peripheral artery disease) (HCC)    S/P BKA (below knee amputation) bilateral (HCC)    Moderate malnutrition ([address]    History of below knee amputation, right ([address]    History of below knee amputation, left ([address]    Metabolic encephalopathy    Altered mental status  Resolved Problems:    * No resolved hospital problems. *    Acute alteration in mental status: Resolved  · UA/UDS-unremarkable  · CT head-no acute intracranial abnormality.     Aspiration pneumonia/ previous osteomyelitis of left BKA, wound culture positive for MRSA and gram negative rods  · History of esophagectomy.    · CXR and CT chest shows bilateral interstitial and airspace disease with new diffuse tree-in-bud nodularity in the right lung-pneumonia picture, possibly aspiration. · Respiratory panel, molecular-remarkable  · On vancomycin 1250 g q24 h as per pharmacy and cefepime 2 g q12h end date - 09/09/21; okay to stop antibiotics on discharge as per Infectious disease  · Continue Mucinex  · Spirometry     Diabetes mellitus  · Increased lantus to 30 U  · On medium dose sliding scale     Hypertension  · on Lopresor 25 mg qd po     Hypokalemia - resolved, K 4.4 today  - will monitor BMP     Constipation - resolved  - lactulose 20 g tid  - d/c naloxegol 25 mg qd     DVT ppx : eliquis  GI ppx: famotidine    PT/OT: pt/ot on board  Discharge [facility] [place]: discharge today,  on board    Quinn Moser MD  Internal Medicine Resident, PGY-1  [facility];  [place], [place]  9/7/2021, 5:40 AM

## 2021-09-07 NOTE — PROGRESS NOTES
[facility] Baires, [facility]. Altered mental status, unspecified altered mental status type [R41.82]  Acute alteration in mental status [R41.82] . Vitals:    09/07/21 0739   BP: (!) 153/78   Pulse: 79   Resp: 14   Temp: 97.9 °F (36.6 °C)   SpO2:    . Patients home meds are   Prior to Admission medications    Medication Sig Start Date End Date Taking?  Authorizing Provider   metoprolol tartrate (LOPRESSOR) 25 MG tablet Take 1 tablet by mouth 2 times daily Hold for heart rate less than 60 or systolic blood pressure less than 100 9/2/21  Yes Jack Garcia MD   guaiFENesin (MUCINEX) 600 MG extended release tablet Take 1 tablet by mouth 2 times daily for 10 days 8/31/21 9/10/21 Yes Chrystal Wei MD   simethicone (MYLICON) 80 MG chewable tablet Take 1 tablet by mouth every 6 hours as needed for Flatulence 5/27/21   MARCELO Perez - NP   ferrous sulfate (IRON 325) 325 (65 Fe) MG tablet Take 1 tablet by mouth 2 times daily 5/27/21   MARCELO Perez NP   naloxone 4 MG/0.1ML LIQD nasal spray 1 spray by Nasal route as needed for Opioid Reversal 5/27/21   MARCELO Perez NP   polyethylene glycol (GLYCOLAX) 17 g packet Take 17 g by mouth daily as needed for Constipation    Historical Provider, MD   Multiple Vitamins-Minerals (THERAPEUTIC MULTIVITAMIN-MINERALS) tablet Take 1 tablet by mouth daily    Historical Provider, MD   aluminum & magnesium hydroxide-simethicone (MAALOX) 200-200-20 MG/5ML SUSP suspension Take 10 mLs by mouth every 6 hours as needed for Indigestion     Historical Provider, MD   ondansetron (ZOFRAN) 4 MG tablet Take 4 mg by mouth every 8 hours as needed for Nausea or Vomiting    Historical Provider, MD   senna (SENOKOT) 8.6 MG tablet Take 2 tablets by mouth 2 times daily as needed for Constipation    Historical Provider, MD   glucose (GLUTOSE) 40 % GEL Take 37.5 mLs by mouth as needed (hypoglycemia) 4/9/21   Herman Rojas DO   insulin lispro (HUMALOG) 100 UNIT/ML injection vial Inject 0-6 Units into the skin 3 times daily (with meals) 4/9/21   Herman Antis Orlop, DO   insulin lispro (HUMALOG) 100 UNIT/ML injection vial Inject 0-3 Units into the skin nightly 4/9/21   Herman Antis Orlop, DO   bisacodyl (DULCOLAX) 5 MG EC tablet Take 1 tablet by mouth daily as needed for Constipation 4/9/21   Herman Antis Orlop, DO   tamsulosin (FLOMAX) 0.4 MG capsule Take 1 capsule by mouth daily  Patient taking differently: Take 0.4 mg by mouth nightly  4/10/21   Herman Antis Orlop, DO   hydrOXYzine (VISTARIL) 25 MG capsule Take 25 mg by mouth 3 times daily as needed 3/15/21   Historical Provider, MD   amitriptyline (ELAVIL) 75 MG tablet Take 1 tablet by mouth nightly 3/19/21   Robert Rivera DO   glucose monitoring kit (FREESTYLE) monitoring kit 1 kit by Does not apply route daily 3/7/21   Robert Rivera DO   blood glucose test strips (ASCENSIA AUTODISC VI;ONE TOUCH ULTRA TEST VI) strip Use with associated glucose meter to check fluctuating blood sugars BID 3/7/21   Robert Rivera DO   Lancets MISC 1 each by Does not apply route 3 times daily 3/7/21   Robert Rivera DO   famotidine (PEPCID) 20 MG tablet Take 20 mg by mouth 2 times daily    Historical Provider, MD   budesonide-formoterol (SYMBICORT) 160-4.5 MCG/ACT AERO Inhale 2 puffs into the lungs 2 times daily 1/11/21   Tonya Dye MD   ipratropium-albuterol (DUONEB) 0.5-2.5 (3) MG/3ML SOLN nebulizer solution Inhale 3 mLs into the lungs every 4 hours as needed for Shortness of Breath 1/11/21   Tonya Dye MD   insulin glargine (LANTUS) 100 UNIT/ML injection vial Inject 25 Units into the skin Daily with supper 1/11/21   Tonya Dye MD   lactobacillus (BACID) TABS Take 1 tablet by mouth 2 times daily 1/11/21   Tonya Dye MD   apixaban (ELIQUIS) 5 MG TABS tablet Take 1 tablet by mouth 2 times daily 9/5/20   Robert Rivera DO   albuterol sulfate HFA (VENTOLIN HFA) 108 (90 Base) MCG/ACT inhaler Inhale 2 puffs into the lungs every 6 hours as needed for Wheezing    Historical Provider, MD   metFORMIN (GLUCOPHAGE) 500 MG tablet Take 1 tablet by mouth 2 times daily (with meals) 3/9/20   Adriana Jorge DO   .   Recent Surgical History: None = 0     Assessment         RR 16  Breath Sounds: Clear/Diminished      · Bronchodilator assessment at level  1  · Hyperinflation assessment at level   · Secretion Management assessment at level    ·   · [x]    Bronchodilator Assessment  BRONCHODILATOR ASSESSMENT SCORE  Score 0 1 2 3 4 5   Breath Sounds   []  Patient Baseline [x]  No Wheeze good aeration []  Faint, scattered wheezing, good aeration []  Expiratory Wheezing and or moderately diminished []  Insp/Exp wheeze and/or very diminished []  Insp/Exp and/ or marked distress   Respiratory Rate   []  Patient Baseline [x]  Less than 20 []  Less than 20 []  20-25 []  Greater than 25 []  Greater than 25   Peak flow % of Pred or PB [x]  NA   []  Greater than 90%  []  81-90% []  71-80% []  Less than or equal to 70%  or unable to perform []  Unable due to Respiratory Distress   Dyspnea re []  Patient Baseline [x]  No SOB []  No SOB []  SOB on exertion []  SOB min activity []  At rest/acute   e FEV% Predicted       [x]  NA []  Above 69%  []  Unable []  Above 60-69%  []  Unable []  Above 50-59%  []  Unable []  Above 35-49%  []  Unable []  Less than 35%  []  Unable

## 2021-09-08 NOTE — PROGRESS NOTES
Pt discharged to home. PICC and monitor removed. Discharge instructions given and explained to pt. Pt verbalized understanding. Pt denies any questions or concerns. Pt assisted to front door in wheelchair with all belongings. Cab taking him home, pt has voucher.

## 2021-09-08 NOTE — PLAN OF CARE
Problem: Breathing Pattern - Ineffective:  Goal: Ability to achieve and maintain a regular respiratory rate will improve  Description: Ability to achieve and maintain a regular respiratory rate will improve  9/8/2021 0036 by Victorino Heredia RN  Outcome: Completed  9/7/2021 1838 by Emely Salazar RN  Outcome: Ongoing     Problem: Nutrition  Goal: Optimal nutrition therapy  9/8/2021 0036 by Victorino Heredia RN  Outcome: Completed  9/7/2021 1838 by Emely Salazar RN  Outcome: Ongoing     Problem: Skin Integrity:  Goal: Will show no infection signs and symptoms  Description: Will show no infection signs and symptoms  9/8/2021 0036 by Victorino Heredia RN  Outcome: Completed  9/7/2021 1838 by Emely Salazar RN  Outcome: Ongoing  Goal: Absence of new skin breakdown  Description: Absence of new skin breakdown  9/8/2021 0036 by Victorino Heredia RN  Outcome: Completed  9/7/2021 1838 by Emely Salazar RN  Outcome: Ongoing  Goal: Demonstration of wound healing without infection will improve  Description: Demonstration of wound healing without infection will improve  9/8/2021 0036 by Victorino Heredia RN  Outcome: Completed  9/7/2021 1838 by Emely Salazar RN  Outcome: Ongoing  Goal: Complications related to intravenous access or infusion will be avoided or minimized  Description: Complications related to intravenous access or infusion will be avoided or minimized  9/8/2021 0036 by Victorino Heredia RN  Outcome: Completed  9/7/2021 1838 by Emely Salazar RN  Outcome: Ongoing     Problem: Falls - Risk of:  Goal: Will remain free from falls  Description: Will remain free from falls  9/8/2021 0036 by Victorino Heredia RN  Outcome: Completed  9/7/2021 1838 by Emely Salazar RN  Outcome: Ongoing  Goal: Absence of physical injury  Description: Absence of physical injury  9/8/2021 0036 by Victorino Heredia RN  Outcome: Completed  9/7/2021 1838 by Emely Salazar RN  Outcome: Ongoing     Problem: Pain:  Goal: Pain level will decrease  Description: Pain level will decrease  9/8/2021 0036 by Negrita Curran RN  Outcome: Completed  9/7/2021 1838 by Kayla Braun RN  Outcome: Ongoing  Goal: Control of acute pain  Description: Control of acute pain  9/8/2021 0036 by Negrita Curran RN  Outcome: Completed  9/7/2021 1838 by Kayla Braun RN  Outcome: Ongoing  Goal: Control of chronic pain  Description: Control of chronic pain  9/8/2021 0036 by Negrita Curran RN  Outcome: Completed  9/7/2021 1838 by Kayla Braun RN  Outcome: Ongoing     Problem: Nutritional:  Goal: Nutritional status will improve  Description: Nutritional status will improve  9/8/2021 0036 by Negrita Curran RN  Outcome: Completed  9/7/2021 1838 by Kayla Braun RN  Outcome: Ongoing     Problem: Physical Regulation:  Goal: Diagnostic test results will improve  Description: Diagnostic test results will improve  9/8/2021 0036 by Negrita Curran RN  Outcome: Completed  9/7/2021 1838 by Kayla Braun RN  Outcome: Ongoing  Goal: Will remain free from infection  Description: Will remain free from infection  9/8/2021 0036 by Negrita Curran RN  Outcome: Completed  9/7/2021 1838 by Kayla Braun RN  Outcome: Ongoing  Goal: Ability to maintain vital signs within normal range will improve  Description: Ability to maintain vital signs within normal range will improve  9/8/2021 0036 by Negrita Curran RN  Outcome: Completed  9/7/2021 1838 by Kayla Braun RN  Outcome: Ongoing     Problem: Respiratory:  Goal: Ability to maintain normal respiratory secretions will improve  Description: Ability to maintain normal respiratory secretions will improve  9/8/2021 0036 by Negrita Curran RN  Outcome: Completed  9/7/2021 1838 by Kayla Braun RN  Outcome: Ongoing

## 2021-09-17 ENCOUNTER — HOSPITAL ENCOUNTER (EMERGENCY)
Age: 66
Discharge: HOME | End: 2021-09-18
Attending: STUDENT IN AN ORGANIZED HEALTH CARE EDUCATION/TRAINING PROGRAM
Payer: MEDICARE

## 2021-09-17 ENCOUNTER — HOSPITAL ENCOUNTER (EMERGENCY)
Age: 66
Discharge: HOME | End: 2021-09-17
Attending: EMERGENCY MEDICINE
Payer: MEDICARE

## 2021-09-17 ENCOUNTER — APPOINTMENT (OUTPATIENT)
Dept: GENERAL RADIOLOGY | Age: 66
End: 2021-09-17
Payer: MEDICARE

## 2021-09-17 VITALS
DIASTOLIC BLOOD PRESSURE: 68 MMHG | HEART RATE: 94 BPM | SYSTOLIC BLOOD PRESSURE: 139 MMHG | TEMPERATURE: 98.4 F | OXYGEN SATURATION: 95 % | RESPIRATION RATE: 18 BRPM

## 2021-09-17 VITALS
WEIGHT: 165 LBS | OXYGEN SATURATION: 97 % | BODY MASS INDEX: 21.87 KG/M2 | SYSTOLIC BLOOD PRESSURE: 113 MMHG | TEMPERATURE: 98 F | HEIGHT: 73 IN | DIASTOLIC BLOOD PRESSURE: 83 MMHG | RESPIRATION RATE: 16 BRPM | HEART RATE: 101 BPM

## 2021-09-17 DIAGNOSIS — K59.00 CONSTIPATION, UNSPECIFIED CONSTIPATION TYPE: Primary | ICD-10-CM

## 2021-09-17 DIAGNOSIS — R21 RASH AND OTHER NONSPECIFIC SKIN ERUPTION: ICD-10-CM

## 2021-09-17 DIAGNOSIS — K59.03 DRUG-INDUCED CONSTIPATION: Primary | ICD-10-CM

## 2021-09-17 PROCEDURE — 6370000000 HC RX 637 (ALT 250 FOR IP): Performed by: EMERGENCY MEDICINE

## 2021-09-17 PROCEDURE — 6370000000 HC RX 637 (ALT 250 FOR IP): Performed by: STUDENT IN AN ORGANIZED HEALTH CARE EDUCATION/TRAINING PROGRAM

## 2021-09-17 PROCEDURE — 99283 EMERGENCY DEPT VISIT LOW MDM: CPT

## 2021-09-17 PROCEDURE — 74018 RADEX ABDOMEN 1 VIEW: CPT

## 2021-09-17 RX ORDER — CEPHALEXIN 500 MG/1
500 CAPSULE ORAL 2 TIMES DAILY
Qty: 14 CAPSULE | Refills: 0 | Status: SHIPPED | OUTPATIENT
Start: 2021-09-17 | End: 2021-09-24

## 2021-09-17 RX ORDER — ACETAMINOPHEN 500 MG
1000 TABLET ORAL EVERY 6 HOURS PRN
Qty: 30 TABLET | Refills: 0 | Status: ON HOLD | OUTPATIENT
Start: 2021-09-17 | End: 2021-11-12 | Stop reason: HOSPADM

## 2021-09-17 RX ORDER — DOCUSATE SODIUM 100 MG/1
100 CAPSULE, LIQUID FILLED ORAL 2 TIMES DAILY
Qty: 60 CAPSULE | Refills: 0 | Status: ON HOLD | OUTPATIENT
Start: 2021-09-17 | End: 2022-02-21

## 2021-09-17 RX ORDER — LORAZEPAM 1 MG/1
1 TABLET ORAL ONCE
Status: COMPLETED | OUTPATIENT
Start: 2021-09-17 | End: 2021-09-17

## 2021-09-17 RX ORDER — CEPHALEXIN 500 MG/1
500 CAPSULE ORAL ONCE
Status: COMPLETED | OUTPATIENT
Start: 2021-09-17 | End: 2021-09-17

## 2021-09-17 RX ORDER — ACETAMINOPHEN 500 MG
1000 TABLET ORAL ONCE
Status: COMPLETED | OUTPATIENT
Start: 2021-09-17 | End: 2021-09-17

## 2021-09-17 RX ORDER — LORAZEPAM 1 MG/1
0.5 TABLET ORAL ONCE
Status: COMPLETED | OUTPATIENT
Start: 2021-09-17 | End: 2021-09-17

## 2021-09-17 RX ORDER — HYDROCODONE BITARTRATE AND ACETAMINOPHEN 5; 325 MG/1; MG/1
1 TABLET ORAL ONCE
Status: COMPLETED | OUTPATIENT
Start: 2021-09-17 | End: 2021-09-17

## 2021-09-17 RX ADMIN — ACETAMINOPHEN 1000 MG: 500 TABLET ORAL at 21:51

## 2021-09-17 RX ADMIN — MAGESIUM CITRATE 296 ML: 1.75 LIQUID ORAL at 09:00

## 2021-09-17 RX ADMIN — HYDROCODONE BITARTRATE AND ACETAMINOPHEN 1 TABLET: 5; 325 TABLET ORAL at 05:03

## 2021-09-17 RX ADMIN — LORAZEPAM 0.5 MG: 1 TABLET ORAL at 05:02

## 2021-09-17 RX ADMIN — LORAZEPAM 1 MG: 1 TABLET ORAL at 21:51

## 2021-09-17 RX ADMIN — CEPHALEXIN 500 MG: 500 CAPSULE ORAL at 21:53

## 2021-09-17 ASSESSMENT — PAIN DESCRIPTION - PAIN TYPE
TYPE: ACUTE PAIN
TYPE: ACUTE PAIN

## 2021-09-17 ASSESSMENT — PAIN SCALES - GENERAL
PAINLEVEL_OUTOF10: 9
PAINLEVEL_OUTOF10: 10
PAINLEVEL_OUTOF10: 9
PAINLEVEL_OUTOF10: 10

## 2021-09-17 ASSESSMENT — PAIN DESCRIPTION - LOCATION
LOCATION: SCROTUM;BUTTOCKS
LOCATION: ABDOMEN
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
## 2021-09-18 PROCEDURE — 6370000000 HC RX 637 (ALT 250 FOR IP): Performed by: STUDENT IN AN ORGANIZED HEALTH CARE EDUCATION/TRAINING PROGRAM

## 2021-09-18 RX ADMIN — MAGESIUM CITRATE 296 ML: 1.75 LIQUID ORAL at 00:41

## 2021-09-18 ASSESSMENT — ENCOUNTER SYMPTOMS
CONSTIPATION: 1
EYE REDNESS: 0
ABDOMINAL PAIN: 1
COLOR CHANGE: 0
EYE DISCHARGE: 0
SHORTNESS OF BREATH: 0
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
## 2021-09-18 NOTE — ED NOTES
Bed: 15  Expected date:   Expected time:   Means of arrival:   Comments:  [facility] Saúl Lin [facility]  09/17/21 2033

## 2021-09-24 ENCOUNTER — OFFICE VISIT (OUTPATIENT)
Dept: FAMILY MEDICINE CLINIC | Age: 66
End: 2021-09-24
Payer: MEDICARE

## 2021-09-24 VITALS
TEMPERATURE: 97.9 F | OXYGEN SATURATION: 92 % | SYSTOLIC BLOOD PRESSURE: 108 MMHG | HEART RATE: 100 BPM | WEIGHT: 165 LBS | HEIGHT: 73 IN | DIASTOLIC BLOOD PRESSURE: 60 MMHG | BODY MASS INDEX: 21.87 KG/M2

## 2021-09-24 DIAGNOSIS — J44.9 COPD WITHOUT EXACERBATION (HCC): ICD-10-CM

## 2021-09-24 DIAGNOSIS — Z89.512 S/P BKA (BELOW KNEE AMPUTATION) BILATERAL (HCC): ICD-10-CM

## 2021-09-24 DIAGNOSIS — Z78.9 POOR HISTORIAN: ICD-10-CM

## 2021-09-24 DIAGNOSIS — E11.42 DM TYPE 2 WITH DIABETIC PERIPHERAL NEUROPATHY (HCC): ICD-10-CM

## 2021-09-24 DIAGNOSIS — K21.9 GASTROESOPHAGEAL REFLUX DISEASE, UNSPECIFIED WHETHER ESOPHAGITIS PRESENT: ICD-10-CM

## 2021-09-24 DIAGNOSIS — Z72.0 TOBACCO ABUSE: ICD-10-CM

## 2021-09-24 DIAGNOSIS — I10 ESSENTIAL HYPERTENSION: ICD-10-CM

## 2021-09-24 DIAGNOSIS — Z89.511 S/P BKA (BELOW KNEE AMPUTATION) BILATERAL (HCC): ICD-10-CM

## 2021-09-24 DIAGNOSIS — I26.99 OTHER ACUTE PULMONARY EMBOLISM WITHOUT ACUTE COR PULMONALE (HCC): ICD-10-CM

## 2021-09-24 DIAGNOSIS — G47.00 INSOMNIA, UNSPECIFIED TYPE: ICD-10-CM

## 2021-09-24 DIAGNOSIS — Z12.5 PROSTATE CANCER SCREENING: ICD-10-CM

## 2021-09-24 DIAGNOSIS — I65.23 CAROTID STENOSIS, ASYMPTOMATIC, BILATERAL: Primary | Chronic | ICD-10-CM

## 2021-09-24 DIAGNOSIS — Z91.14 HX OF MEDICATION NONCOMPLIANCE: ICD-10-CM

## 2021-09-24 PROCEDURE — 99214 OFFICE O/P EST MOD 30 MIN: CPT | Performed by: NURSE PRACTITIONER

## 2021-09-24 RX ORDER — ATORVASTATIN CALCIUM 10 MG/1
10 TABLET, FILM COATED ORAL NIGHTLY
COMMUNITY
Start: 2021-08-09 | End: 2022-03-24 | Stop reason: SDUPTHER

## 2021-09-24 RX ORDER — BLOOD-GLUCOSE METER
1 KIT MISCELLANEOUS DAILY
Qty: 1 KIT | Refills: 0 | Status: SHIPPED | OUTPATIENT
Start: 2021-09-24 | End: 2022-03-24 | Stop reason: SDUPTHER

## 2021-09-24 RX ORDER — AMITRIPTYLINE HYDROCHLORIDE 75 MG/1
75 TABLET, FILM COATED ORAL NIGHTLY
Qty: 30 TABLET | Refills: 1 | Status: SHIPPED | OUTPATIENT
Start: 2021-09-24 | End: 2021-09-28 | Stop reason: SDUPTHER

## 2021-09-24 RX ORDER — INSULIN GLARGINE 100 [IU]/ML
25 INJECTION, SOLUTION SUBCUTANEOUS
Qty: 10 ML | Refills: 1 | Status: SHIPPED | OUTPATIENT
Start: 2021-09-24 | End: 2021-09-28 | Stop reason: SDUPTHER

## 2021-09-24 RX ORDER — FAMOTIDINE 20 MG/1
20 TABLET, FILM COATED ORAL 2 TIMES DAILY
Qty: 60 TABLET | Refills: 2 | Status: SHIPPED | OUTPATIENT
Start: 2021-09-24 | End: 2021-09-28 | Stop reason: SDUPTHER

## 2021-09-24 ASSESSMENT — ENCOUNTER SYMPTOMS
ABDOMINAL PAIN: 0
VOMITING: 0
SHORTNESS OF BREATH: 0
COUGH: 0
NAUSEA: 0
BACK PAIN: 0
DIARRHEA: 1
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
## 2021-09-24 NOTE — PATIENT INSTRUCTIONS
Patient Education        Learning About Type 2 Diabetes  What is type 2 diabetes? Type 2 diabetes is a condition in which you have too much sugar (glucose) in your blood. Glucose is a type of sugar produced in your body when carbohydrates and other foods are digested. It provides energy to cells throughout the body. Normally, blood sugar levels increase after you eat a meal. When blood sugar rises, cells in the pancreas release insulin, which causes the body to absorb sugar from the blood and lowers the blood sugar level to normal.  When you have type 2 diabetes, sugar stays in the blood rather than entering the body's cells to be used for energy. This results in high blood sugar. It happens when your body can't use insulin the right way. Over time, high blood sugar can harm many parts of the body, such as your eyes, heart, blood vessels, nerves, and kidneys. It can also increase your risk for other health problems (complications). What can you expect with type 2 diabetes? Aixa Faulkner keep hearing about how important it is to keep your blood sugar within a target range. That's because over time, high blood sugar can lead to serious problems. It can:  · Harm your eyes, nerves, and kidneys. · Damage your blood vessels, leading to heart disease and stroke. · Reduce blood flow and cause nerve damage to parts of your body, especially your feet. This can cause slow healing and pain when you walk. · Make your immune system weak and less able to fight infections. When people hear the word \"diabetes,\" they often think of problems like these. But daily care and treatment can help prevent or delay these problems. The goal is to keep your blood sugar in a target range. That's the best way to reduce your chance of having more problems from diabetes. What are the symptoms? Some people who have type 2 diabetes may not have any symptoms early on.  Many people with the disease don't even know they have it at first. But with time, diabetes starts to cause symptoms. You have most symptoms of type 2 diabetes when your blood sugar is either too high or too low. The most common symptoms of high blood sugar include:  · Thirst.  · Needing to urinate often. · Weight loss. · Blurry vision. The symptoms of low blood sugar include:  · Sweating. · Shakiness. · Weakness. · Hunger. · Confusion. You're not likely to get symptoms of low blood sugar unless you take insulin or use certain diabetes medicines that lower blood sugar. How can you help prevent type 2 diabetes? There are things you can do to help prevent type 2 diabetes. Stay at a healthy weight. Exercise regularly, and eat healthy foods. Even small changes can make a difference. If you have prediabetes, the medicine metformin can help prevent type 2 diabetes. How is type 2 diabetes treated? Treatment for type 2 diabetes will change over time to meet your needs. But the focus of your treatment will usually be to keep your blood sugar levels in your target range. This will help prevent problems such as eye, kidney, heart, blood vessel, and nerve disease. Some people may need medicines to help their bodies make insulin or decrease insulin resistance. Some medicines slow down how quickly the body absorbs carbohydrates. Treatment to manage type 2 diabetes includes:  · Making healthy food choices and being active. · Losing weight, if you need to. · Seeing your doctor regularly. · Keeping your blood sugar in your target range. · Taking medicines, if you need them. · Quitting smoking, if you smoke. · Keeping your blood pressure and cholesterol under control. Follow-up care is a key part of your treatment and safety. Be sure to make and go to all appointments, and call your doctor if you are having problems. It's also a good idea to know your test results and keep a list of the medicines you take. Where can you learn more? Go to [email or web address] and sign in to your [employer] account. Enter V381 in the [facility] box to learn more about \"Learning About Type 2 Diabetes. \"     If you do not have an account, please click on the \"Sign Up Now\" link. Current as of: August 31, 2020               Content Version: 13.0  © 5586-5356 [employer]. Care instructions adapted under license by [employer]. If you have questions about a medical condition or this instruction, always ask your healthcare professional. [address] any warranty or liability for your use of this information.

## 2021-09-24 NOTE — PROGRESS NOTES
Visit Information    Have you changed or started any medications since your last visit including any over-the-counter medicines, vitamins, or herbal medicines? no   Have you stopped taking any of your medications? Is so, why? -  no  Are you having any side effects from any of your medications? - no    Have you seen any other physician or provider since your last visit?  no   Have you had any other diagnostic tests since your last visit?  no   Have you been seen in the emergency room and/or had an admission in a hospital since we last saw you?  no   Have you had your routine dental cleaning in the past 6 months?  no     Do you have an active MyChart account? If no, what is the barrier?   Yes    Patient Care Team:  Prem Vega DO as PCP - General (Family Medicine)  Keiko Fish MD as Surgeon (Surgical Oncology)  Niko Thomas DPM as Surgeon (Podiatry)  Humberto Yan MD as Consulting Physician (Ophthalmology)  Osmel Gaviria MD as Consulting Physician (Gastroenterology)  [address] History Review  Past Medical, Family, and Social History reviewed and  contribute to the patient presenting condition    Health Maintenance   Topic Date Due    Hepatitis C screen  Never done    COVID-19 Vaccine (1) Never done    HIV screen  Never done    Shingles Vaccine (1 of 2) Never done    Diabetic retinal exam  03/18/2016    Colon cancer screen colonoscopy  04/26/2017    Lipid screen  06/24/2019    Diabetic microalbuminuria test  01/10/2020    Annual Wellness Visit (AWV)  Never done    A1C test (Diabetic or Prediabetic)  07/03/2021    Flu vaccine (1) 09/01/2021    Diabetic foot exam  02/03/2022    Low dose CT lung screening  03/15/2022    Potassium monitoring  09/07/2022    Creatinine monitoring  09/07/2022    Pneumococcal 0-64 years Vaccine (2 of 2 - PPSV23) 10/10/2022    DTaP/Tdap/Td vaccine (2 - Td or Tdap) 07/01/2029    Hepatitis A vaccine  Aged Out    Hib vaccine Aged Out    Meningococcal (ACWY) vaccine  Aged Out

## 2021-09-27 ENCOUNTER — TELEPHONE (OUTPATIENT)
Dept: FAMILY MEDICINE CLINIC | Age: 66
End: 2021-09-27

## 2021-09-27 NOTE — TELEPHONE ENCOUNTER
Home Health called stating that patient needs glucose test strips, lantis , alcohol pads and lantus. Also, there was not an order given for patients insulin and patient also is having difficulty sleeping so a medication is needed in order to aid with this.

## 2021-09-28 ENCOUNTER — TELEPHONE (OUTPATIENT)
Dept: FAMILY MEDICINE CLINIC | Age: 66
End: 2021-09-28

## 2021-09-28 DIAGNOSIS — G47.00 INSOMNIA, UNSPECIFIED TYPE: ICD-10-CM

## 2021-09-28 DIAGNOSIS — K21.9 GASTROESOPHAGEAL REFLUX DISEASE, UNSPECIFIED WHETHER ESOPHAGITIS PRESENT: ICD-10-CM

## 2021-09-28 DIAGNOSIS — E11.42 DM TYPE 2 WITH DIABETIC PERIPHERAL NEUROPATHY (HCC): ICD-10-CM

## 2021-09-28 DIAGNOSIS — E11.42 DM TYPE 2 WITH DIABETIC PERIPHERAL NEUROPATHY (HCC): Primary | ICD-10-CM

## 2021-09-28 RX ORDER — LANCETS 30 GAUGE
1 EACH MISCELLANEOUS 3 TIMES DAILY
Qty: 600 EACH | Refills: 1 | Status: SHIPPED | OUTPATIENT
Start: 2021-09-28 | End: 2021-10-05 | Stop reason: SDUPTHER

## 2021-09-28 RX ORDER — INSULIN GLARGINE 100 [IU]/ML
25 INJECTION, SOLUTION SUBCUTANEOUS
Qty: 10 ML | Refills: 1 | Status: SHIPPED | OUTPATIENT
Start: 2021-09-28 | End: 2021-09-28 | Stop reason: SDUPTHER

## 2021-09-28 RX ORDER — AMITRIPTYLINE HYDROCHLORIDE 75 MG/1
75 TABLET, FILM COATED ORAL NIGHTLY
Qty: 30 TABLET | Refills: 1 | Status: SHIPPED | OUTPATIENT
Start: 2021-09-28 | End: 2021-10-05 | Stop reason: SDUPTHER

## 2021-09-28 RX ORDER — INSULIN DETEMIR 100 [IU]/ML
INJECTION, SOLUTION SUBCUTANEOUS
Qty: 10 ML | Refills: 3 | Status: SHIPPED | OUTPATIENT
Start: 2021-09-28 | End: 2021-10-05 | Stop reason: SDUPTHER

## 2021-09-28 RX ORDER — INSULIN GLARGINE 100 [IU]/ML
25 INJECTION, SOLUTION SUBCUTANEOUS
Qty: 10 ML | Refills: 1 | Status: SHIPPED | OUTPATIENT
Start: 2021-09-28 | End: 2021-11-03

## 2021-09-28 RX ORDER — FAMOTIDINE 20 MG/1
20 TABLET, FILM COATED ORAL 2 TIMES DAILY
Qty: 60 TABLET | Refills: 2 | Status: SHIPPED | OUTPATIENT
Start: 2021-09-28 | End: 2021-10-05 | Stop reason: SDUPTHER

## 2021-09-28 NOTE — TELEPHONE ENCOUNTER
Patient had wrong pharamcy in chart, I updated can you resend all prescriptions?      Updated pharmacy to [facility] on [address]

## 2021-10-04 ENCOUNTER — TELEPHONE (OUTPATIENT)
Dept: FAMILY MEDICINE CLINIC | Age: 66
End: 2021-10-04

## 2021-10-05 DIAGNOSIS — K21.9 GASTROESOPHAGEAL REFLUX DISEASE, UNSPECIFIED WHETHER ESOPHAGITIS PRESENT: ICD-10-CM

## 2021-10-05 DIAGNOSIS — E11.42 DM TYPE 2 WITH DIABETIC PERIPHERAL NEUROPATHY (HCC): ICD-10-CM

## 2021-10-05 DIAGNOSIS — G47.00 INSOMNIA, UNSPECIFIED TYPE: ICD-10-CM

## 2021-10-05 RX ORDER — ONDANSETRON 4 MG/1
4 TABLET, FILM COATED ORAL EVERY 8 HOURS PRN
Qty: 30 TABLET | Refills: 2 | Status: ON HOLD | OUTPATIENT
Start: 2021-10-05 | End: 2021-11-12 | Stop reason: HOSPADM

## 2021-10-05 RX ORDER — HYDROXYZINE PAMOATE 25 MG/1
25 CAPSULE ORAL 3 TIMES DAILY PRN
Qty: 90 CAPSULE | Refills: 0 | Status: ON HOLD | OUTPATIENT
Start: 2021-10-05 | End: 2022-02-21

## 2021-10-05 RX ORDER — NALOXONE HYDROCHLORIDE 4 MG/.1ML
1 SPRAY NASAL PRN
Qty: 1 EACH | Refills: 5 | Status: ON HOLD | OUTPATIENT
Start: 2021-10-05 | End: 2021-11-12 | Stop reason: HOSPADM

## 2021-10-05 RX ORDER — INSULIN DETEMIR 100 [IU]/ML
INJECTION, SOLUTION SUBCUTANEOUS
Qty: 10 ML | Refills: 3 | Status: SHIPPED | OUTPATIENT
Start: 2021-10-05 | End: 2022-03-24 | Stop reason: SDUPTHER

## 2021-10-05 RX ORDER — SIMETHICONE 80 MG
80 TABLET,CHEWABLE ORAL EVERY 6 HOURS PRN
Qty: 180 TABLET | Refills: 3 | Status: ON HOLD | OUTPATIENT
Start: 2021-10-05 | End: 2022-02-21

## 2021-10-05 RX ORDER — FAMOTIDINE 20 MG/1
20 TABLET, FILM COATED ORAL 2 TIMES DAILY
Qty: 60 TABLET | Refills: 2 | Status: SHIPPED | OUTPATIENT
Start: 2021-10-05 | End: 2022-03-24 | Stop reason: SDUPTHER

## 2021-10-05 RX ORDER — BUDESONIDE AND FORMOTEROL FUMARATE DIHYDRATE 160; 4.5 UG/1; UG/1
2 AEROSOL RESPIRATORY (INHALATION) 2 TIMES DAILY
Qty: 1 EACH | Refills: 2 | Status: SHIPPED | OUTPATIENT
Start: 2021-10-05 | End: 2022-03-24 | Stop reason: SDUPTHER

## 2021-10-05 RX ORDER — LANCETS 30 GAUGE
1 EACH MISCELLANEOUS 3 TIMES DAILY
Qty: 600 EACH | Refills: 1 | Status: SHIPPED | OUTPATIENT
Start: 2021-10-05 | End: 2022-03-24 | Stop reason: SDUPTHER

## 2021-10-05 RX ORDER — FERROUS SULFATE 325(65) MG
325 TABLET ORAL 2 TIMES DAILY
Qty: 180 TABLET | Refills: 1 | Status: ON HOLD | OUTPATIENT
Start: 2021-10-05 | End: 2022-02-21

## 2021-10-05 RX ORDER — IPRATROPIUM BROMIDE AND ALBUTEROL SULFATE 2.5; .5 MG/3ML; MG/3ML
3 SOLUTION RESPIRATORY (INHALATION) EVERY 4 HOURS PRN
Qty: 360 ML | Refills: 1 | Status: SHIPPED | OUTPATIENT
Start: 2021-10-05 | End: 2021-11-03

## 2021-10-05 RX ORDER — AMITRIPTYLINE HYDROCHLORIDE 75 MG/1
75 TABLET, FILM COATED ORAL NIGHTLY
Qty: 30 TABLET | Refills: 1 | Status: ON HOLD | OUTPATIENT
Start: 2021-10-05 | End: 2022-02-21

## 2021-10-09 ENCOUNTER — TELEPHONE (OUTPATIENT)
Dept: FAMILY MEDICINE CLINIC | Age: 66
End: 2021-10-09

## 2021-10-09 NOTE — TELEPHONE ENCOUNTER
----- Message from Pollyann Kanner sent at 10/8/2021  3:20 PM EDT -----  Subject: Message to Provider    QUESTIONS  Information for Provider? patient starting theropy next week per landy   ---------------------------------------------------------------------------  --------------  CALL BACK INFO  What is the best way for the office to contact you? OK to leave message on   voicemail  Preferred Call Back Phone Number? [phone]  ---------------------------------------------------------------------------  --------------  SCRIPT ANSWERS  Relationship to Patient?  Third Party  Representative Name? Sachi De La Cruz

## 2021-11-03 ENCOUNTER — HOSPITAL ENCOUNTER (INPATIENT)
Age: 66
LOS: 10 days | Discharge: NURSING, REHAB OR LONG-TERM CARE | DRG: 603 | End: 2021-11-13
Attending: STUDENT IN AN ORGANIZED HEALTH CARE EDUCATION/TRAINING PROGRAM | Admitting: INTERNAL MEDICINE
Payer: MEDICARE

## 2021-11-03 ENCOUNTER — APPOINTMENT (OUTPATIENT)
Dept: GENERAL RADIOLOGY | Age: 66
DRG: 603 | End: 2021-11-03
Payer: MEDICARE

## 2021-11-03 DIAGNOSIS — M79.2 NEUROPATHIC PAIN OF RIGHT LOWER EXTREMITY: ICD-10-CM

## 2021-11-03 DIAGNOSIS — Z89.511 HISTORY OF BELOW KNEE AMPUTATION, RIGHT (HCC): ICD-10-CM

## 2021-11-03 DIAGNOSIS — L03.90 CELLULITIS, UNSPECIFIED CELLULITIS SITE: Primary | ICD-10-CM

## 2021-11-03 LAB
-: ABNORMAL
-: NORMAL
ABSOLUTE EOS #: 0.09 K/UL (ref 0–0.44)
ABSOLUTE IMMATURE GRANULOCYTE: 0.13 K/UL (ref 0–0.3)
ABSOLUTE LYMPH #: 1.53 K/UL (ref 1.1–3.7)
ABSOLUTE MONO #: 1.45 K/UL (ref 0.1–1.2)
ALBUMIN SERPL-MCNC: 2.8 G/DL (ref 3.5–5.2)
ALBUMIN/GLOBULIN RATIO: ABNORMAL (ref 1–2.5)
ALP BLD-CCNC: 119 U/L (ref 40–129)
ALT SERPL-CCNC: 5 U/L (ref 5–41)
AMORPHOUS: ABNORMAL
ANION GAP SERPL CALCULATED.3IONS-SCNC: 11 MMOL/L (ref 9–17)
AST SERPL-CCNC: 6 U/L
BACTERIA: ABNORMAL
BASOPHILS # BLD: 1 % (ref 0–2)
BASOPHILS ABSOLUTE: 0.1 K/UL (ref 0–0.2)
BILIRUB SERPL-MCNC: 0.13 MG/DL (ref 0.3–1.2)
BILIRUBIN URINE: ABNORMAL
BUN BLDV-MCNC: 23 MG/DL (ref 8–23)
BUN/CREAT BLD: 24 (ref 9–20)
CALCIUM SERPL-MCNC: 8.7 MG/DL (ref 8.6–10.4)
CASTS UA: ABNORMAL /LPF
CASTS UA: ABNORMAL /LPF
CHLORIDE BLD-SCNC: 97 MMOL/L (ref 98–107)
CO2: 23 MMOL/L (ref 20–31)
COLOR: YELLOW
COMMENT UA: ABNORMAL
CREAT SERPL-MCNC: 0.95 MG/DL (ref 0.7–1.2)
CRYSTALS, UA: ABNORMAL /HPF
DIFFERENTIAL TYPE: ABNORMAL
EOSINOPHILS RELATIVE PERCENT: 1 % (ref 1–4)
EPITHELIAL CELLS UA: ABNORMAL /HPF (ref 0–5)
GFR AFRICAN AMERICAN: >60 ML/MIN
GFR NON-AFRICAN AMERICAN: >60 ML/MIN
GFR SERPL CREATININE-BSD FRML MDRD: ABNORMAL ML/MIN/{1.73_M2}
GFR SERPL CREATININE-BSD FRML MDRD: ABNORMAL ML/MIN/{1.73_M2}
GLUCOSE BLD-MCNC: 226 MG/DL (ref 70–99)
GLUCOSE URINE: ABNORMAL
HCT VFR BLD CALC: 37.4 % (ref 40.7–50.3)
HEMOGLOBIN: 12.3 G/DL (ref 13–17)
IMMATURE GRANULOCYTES: 1 %
INR BLD: 1.2
KETONES, URINE: ABNORMAL
LEUKOCYTE ESTERASE, URINE: NEGATIVE
LYMPHOCYTES # BLD: 9 % (ref 24–43)
MCH RBC QN AUTO: 27.2 PG (ref 25.2–33.5)
MCHC RBC AUTO-ENTMCNC: 32.9 G/DL (ref 28.4–34.8)
MCV RBC AUTO: 82.7 FL (ref 82.6–102.9)
MONOCYTES # BLD: 8 % (ref 3–12)
MUCUS: ABNORMAL
NITRITE, URINE: NEGATIVE
NRBC AUTOMATED: 0 PER 100 WBC
OTHER OBSERVATIONS UA: ABNORMAL
PARTIAL THROMBOPLASTIN TIME: 32.5 SEC (ref 23.9–33.8)
PDW BLD-RTO: 17.2 % (ref 11.8–14.4)
PH UA: 6 (ref 5–8)
PLATELET # BLD: 390 K/UL (ref 138–453)
PLATELET ESTIMATE: ABNORMAL
PMV BLD AUTO: ABNORMAL FL (ref 8.1–13.5)
POTASSIUM SERPL-SCNC: 4.1 MMOL/L (ref 3.7–5.3)
PROTEIN UA: ABNORMAL
PROTHROMBIN TIME: 14.9 SEC (ref 11.5–14.2)
RBC # BLD: 4.52 M/UL (ref 4.21–5.77)
RBC # BLD: ABNORMAL 10*6/UL
RBC UA: ABNORMAL /HPF (ref 0–2)
REASON FOR REJECTION: NORMAL
RENAL EPITHELIAL, UA: ABNORMAL /HPF
SARS-COV-2, RAPID: NOT DETECTED
SEG NEUTROPHILS: 81 % (ref 36–65)
SEGMENTED NEUTROPHILS ABSOLUTE COUNT: 14.09 K/UL (ref 1.5–8.1)
SODIUM BLD-SCNC: 131 MMOL/L (ref 135–144)
SPECIFIC GRAVITY UA: 1.04 (ref 1–1.03)
SPECIMEN DESCRIPTION: NORMAL
TOTAL PROTEIN: 6.8 G/DL (ref 6.4–8.3)
TRICHOMONAS: ABNORMAL
TURBIDITY: CLEAR
URINE HGB: ABNORMAL
UROBILINOGEN, URINE: NORMAL
WBC # BLD: 17.4 K/UL (ref 3.5–11.3)
WBC # BLD: ABNORMAL 10*3/UL
WBC UA: ABNORMAL /HPF (ref 0–5)
YEAST: ABNORMAL
ZZ NTE CLEAN UP: ORDERED TEST: NORMAL
ZZ NTE WITH NAME CLEAN UP: SPECIMEN SOURCE: NORMAL

## 2021-11-03 PROCEDURE — 96374 THER/PROPH/DIAG INJ IV PUSH: CPT

## 2021-11-03 PROCEDURE — 96376 TX/PRO/DX INJ SAME DRUG ADON: CPT

## 2021-11-03 PROCEDURE — 85730 THROMBOPLASTIN TIME PARTIAL: CPT

## 2021-11-03 PROCEDURE — 73590 X-RAY EXAM OF LOWER LEG: CPT

## 2021-11-03 PROCEDURE — 87086 URINE CULTURE/COLONY COUNT: CPT

## 2021-11-03 PROCEDURE — 80053 COMPREHEN METABOLIC PANEL: CPT

## 2021-11-03 PROCEDURE — 85610 PROTHROMBIN TIME: CPT

## 2021-11-03 PROCEDURE — 2580000003 HC RX 258: Performed by: STUDENT IN AN ORGANIZED HEALTH CARE EDUCATION/TRAINING PROGRAM

## 2021-11-03 PROCEDURE — 99285 EMERGENCY DEPT VISIT HI MDM: CPT

## 2021-11-03 PROCEDURE — 96375 TX/PRO/DX INJ NEW DRUG ADDON: CPT

## 2021-11-03 PROCEDURE — 6360000002 HC RX W HCPCS: Performed by: STUDENT IN AN ORGANIZED HEALTH CARE EDUCATION/TRAINING PROGRAM

## 2021-11-03 PROCEDURE — 81001 URINALYSIS AUTO W/SCOPE: CPT

## 2021-11-03 PROCEDURE — 85025 COMPLETE CBC W/AUTO DIFF WBC: CPT

## 2021-11-03 PROCEDURE — 71045 X-RAY EXAM CHEST 1 VIEW: CPT

## 2021-11-03 PROCEDURE — 1200000000 HC SEMI PRIVATE

## 2021-11-03 PROCEDURE — 87635 SARS-COV-2 COVID-19 AMP PRB: CPT

## 2021-11-03 PROCEDURE — 99222 1ST HOSP IP/OBS MODERATE 55: CPT | Performed by: NURSE PRACTITIONER

## 2021-11-03 RX ORDER — MORPHINE SULFATE 4 MG/ML
4 INJECTION, SOLUTION INTRAMUSCULAR; INTRAVENOUS ONCE
Status: COMPLETED | OUTPATIENT
Start: 2021-11-03 | End: 2021-11-03

## 2021-11-03 RX ORDER — ONDANSETRON 2 MG/ML
4 INJECTION INTRAMUSCULAR; INTRAVENOUS ONCE
Status: COMPLETED | OUTPATIENT
Start: 2021-11-03 | End: 2021-11-03

## 2021-11-03 RX ORDER — 0.9 % SODIUM CHLORIDE 0.9 %
1000 INTRAVENOUS SOLUTION INTRAVENOUS ONCE
Status: COMPLETED | OUTPATIENT
Start: 2021-11-03 | End: 2021-11-03

## 2021-11-03 RX ORDER — CEPHALEXIN 500 MG/1
500 CAPSULE ORAL EVERY 6 HOURS
Status: ON HOLD | COMMUNITY
Start: 2021-10-30 | End: 2021-11-09 | Stop reason: HOSPADM

## 2021-11-03 RX ORDER — ASPIRIN 81 MG/1
81 TABLET ORAL DAILY
COMMUNITY

## 2021-11-03 RX ORDER — MORPHINE SULFATE 2 MG/ML
2 INJECTION, SOLUTION INTRAMUSCULAR; INTRAVENOUS ONCE
Status: COMPLETED | OUTPATIENT
Start: 2021-11-03 | End: 2021-11-03

## 2021-11-03 RX ADMIN — ONDANSETRON 4 MG: 2 INJECTION INTRAMUSCULAR; INTRAVENOUS at 19:43

## 2021-11-03 RX ADMIN — MORPHINE SULFATE 4 MG: 4 INJECTION INTRAVENOUS at 23:12

## 2021-11-03 RX ADMIN — SODIUM CHLORIDE 1000 ML: 9 INJECTION, SOLUTION INTRAVENOUS at 19:43

## 2021-11-03 RX ADMIN — MORPHINE SULFATE 4 MG: 4 INJECTION INTRAVENOUS at 19:43

## 2021-11-03 RX ADMIN — MORPHINE SULFATE 2 MG: 2 INJECTION, SOLUTION INTRAMUSCULAR; INTRAVENOUS at 22:05

## 2021-11-03 ASSESSMENT — PAIN DESCRIPTION - ORIENTATION: ORIENTATION: LEFT

## 2021-11-03 ASSESSMENT — PAIN SCALES - GENERAL
PAINLEVEL_OUTOF10: 9
PAINLEVEL_OUTOF10: 8
PAINLEVEL_OUTOF10: 10

## 2021-11-03 ASSESSMENT — PAIN DESCRIPTION - LOCATION: LOCATION: LEG

## 2021-11-03 ASSESSMENT — PAIN DESCRIPTION - PAIN TYPE: TYPE: ACUTE PAIN

## 2021-11-03 ASSESSMENT — PAIN DESCRIPTION - FREQUENCY: FREQUENCY: CONTINUOUS

## 2021-11-04 PROBLEM — L03.90 CELLULITIS: Status: RESOLVED | Noted: 2021-11-03 | Resolved: 2021-11-04

## 2021-11-04 LAB
ANION GAP SERPL CALCULATED.3IONS-SCNC: 11 MMOL/L (ref 9–17)
BUN BLDV-MCNC: 20 MG/DL (ref 8–23)
BUN/CREAT BLD: 23 (ref 9–20)
C-REACTIVE PROTEIN: 140.7 MG/L (ref 0–5)
CALCIUM SERPL-MCNC: 8.5 MG/DL (ref 8.6–10.4)
CHLORIDE BLD-SCNC: 103 MMOL/L (ref 98–107)
CO2: 21 MMOL/L (ref 20–31)
CREAT SERPL-MCNC: 0.87 MG/DL (ref 0.7–1.2)
CULTURE: NORMAL
ESTIMATED AVERAGE GLUCOSE: 278 MG/DL
GFR AFRICAN AMERICAN: >60 ML/MIN
GFR NON-AFRICAN AMERICAN: >60 ML/MIN
GFR SERPL CREATININE-BSD FRML MDRD: ABNORMAL ML/MIN/{1.73_M2}
GFR SERPL CREATININE-BSD FRML MDRD: ABNORMAL ML/MIN/{1.73_M2}
GLUCOSE BLD-MCNC: 150 MG/DL (ref 75–110)
GLUCOSE BLD-MCNC: 172 MG/DL (ref 75–110)
GLUCOSE BLD-MCNC: 175 MG/DL (ref 70–99)
GLUCOSE BLD-MCNC: 229 MG/DL (ref 75–110)
GLUCOSE BLD-MCNC: 247 MG/DL (ref 75–110)
GLUCOSE BLD-MCNC: 251 MG/DL (ref 75–110)
HBA1C MFR BLD: 11.3 % (ref 4–6)
HCT VFR BLD CALC: 30.9 % (ref 40.7–50.3)
HEMOGLOBIN: 9.3 G/DL (ref 13–17)
LACTIC ACID, SEPSIS WHOLE BLOOD: NORMAL MMOL/L (ref 0.5–1.9)
LACTIC ACID, SEPSIS: 0.9 MMOL/L (ref 0.5–1.9)
Lab: NORMAL
MAGNESIUM: 2 MG/DL (ref 1.6–2.6)
MCH RBC QN AUTO: 26.3 PG (ref 25.2–33.5)
MCHC RBC AUTO-ENTMCNC: 30.1 G/DL (ref 28.4–34.8)
MCV RBC AUTO: 87.5 FL (ref 82.6–102.9)
NRBC AUTOMATED: 0 PER 100 WBC
PDW BLD-RTO: 14.9 % (ref 11.8–14.4)
PLATELET # BLD: 417 K/UL (ref 138–453)
PMV BLD AUTO: 9.4 FL (ref 8.1–13.5)
POTASSIUM SERPL-SCNC: 3.5 MMOL/L (ref 3.7–5.3)
RBC # BLD: 3.53 M/UL (ref 4.21–5.77)
SEDIMENTATION RATE, ERYTHROCYTE: 97 MM (ref 0–20)
SODIUM BLD-SCNC: 135 MMOL/L (ref 135–144)
SPECIMEN DESCRIPTION: NORMAL
WBC # BLD: 12.3 K/UL (ref 3.5–11.3)

## 2021-11-04 PROCEDURE — 99222 1ST HOSP IP/OBS MODERATE 55: CPT | Performed by: SURGERY

## 2021-11-04 PROCEDURE — 99232 SBSQ HOSP IP/OBS MODERATE 35: CPT | Performed by: INTERNAL MEDICINE

## 2021-11-04 PROCEDURE — 2580000003 HC RX 258: Performed by: STUDENT IN AN ORGANIZED HEALTH CARE EDUCATION/TRAINING PROGRAM

## 2021-11-04 PROCEDURE — 85027 COMPLETE CBC AUTOMATED: CPT

## 2021-11-04 PROCEDURE — 80048 BASIC METABOLIC PNL TOTAL CA: CPT

## 2021-11-04 PROCEDURE — 87070 CULTURE OTHR SPECIMN AEROBIC: CPT

## 2021-11-04 PROCEDURE — 6370000000 HC RX 637 (ALT 250 FOR IP): Performed by: INTERNAL MEDICINE

## 2021-11-04 PROCEDURE — 2580000003 HC RX 258: Performed by: NURSE PRACTITIONER

## 2021-11-04 PROCEDURE — 99222 1ST HOSP IP/OBS MODERATE 55: CPT | Performed by: INTERNAL MEDICINE

## 2021-11-04 PROCEDURE — 82947 ASSAY GLUCOSE BLOOD QUANT: CPT

## 2021-11-04 PROCEDURE — 6370000000 HC RX 637 (ALT 250 FOR IP): Performed by: NURSE PRACTITIONER

## 2021-11-04 PROCEDURE — 83605 ASSAY OF LACTIC ACID: CPT

## 2021-11-04 PROCEDURE — 87205 SMEAR GRAM STAIN: CPT

## 2021-11-04 PROCEDURE — 83735 ASSAY OF MAGNESIUM: CPT

## 2021-11-04 PROCEDURE — 6360000002 HC RX W HCPCS: Performed by: STUDENT IN AN ORGANIZED HEALTH CARE EDUCATION/TRAINING PROGRAM

## 2021-11-04 PROCEDURE — 87186 SC STD MICRODIL/AGAR DIL: CPT

## 2021-11-04 PROCEDURE — 87077 CULTURE AEROBIC IDENTIFY: CPT

## 2021-11-04 PROCEDURE — 86140 C-REACTIVE PROTEIN: CPT

## 2021-11-04 PROCEDURE — 87040 BLOOD CULTURE FOR BACTERIA: CPT

## 2021-11-04 PROCEDURE — 87075 CULTR BACTERIA EXCEPT BLOOD: CPT

## 2021-11-04 PROCEDURE — 85652 RBC SED RATE AUTOMATED: CPT

## 2021-11-04 PROCEDURE — 1200000000 HC SEMI PRIVATE

## 2021-11-04 PROCEDURE — 36415 COLL VENOUS BLD VENIPUNCTURE: CPT

## 2021-11-04 PROCEDURE — 6360000002 HC RX W HCPCS: Performed by: NURSE PRACTITIONER

## 2021-11-04 PROCEDURE — 86403 PARTICLE AGGLUT ANTBDY SCRN: CPT

## 2021-11-04 PROCEDURE — 83036 HEMOGLOBIN GLYCOSYLATED A1C: CPT

## 2021-11-04 RX ORDER — SODIUM CHLORIDE 9 MG/ML
25 INJECTION, SOLUTION INTRAVENOUS PRN
Status: DISCONTINUED | OUTPATIENT
Start: 2021-11-04 | End: 2021-11-13 | Stop reason: HOSPADM

## 2021-11-04 RX ORDER — MAGNESIUM SULFATE 1 G/100ML
1000 INJECTION INTRAVENOUS PRN
Status: DISCONTINUED | OUTPATIENT
Start: 2021-11-04 | End: 2021-11-13 | Stop reason: HOSPADM

## 2021-11-04 RX ORDER — ATORVASTATIN CALCIUM 10 MG/1
10 TABLET, FILM COATED ORAL NIGHTLY
Status: DISCONTINUED | OUTPATIENT
Start: 2021-11-04 | End: 2021-11-13 | Stop reason: HOSPADM

## 2021-11-04 RX ORDER — ONDANSETRON 4 MG/1
4 TABLET, ORALLY DISINTEGRATING ORAL EVERY 8 HOURS PRN
Status: DISCONTINUED | OUTPATIENT
Start: 2021-11-04 | End: 2021-11-13 | Stop reason: HOSPADM

## 2021-11-04 RX ORDER — ACETAMINOPHEN 325 MG/1
650 TABLET ORAL EVERY 6 HOURS PRN
Status: DISCONTINUED | OUTPATIENT
Start: 2021-11-04 | End: 2021-11-13 | Stop reason: HOSPADM

## 2021-11-04 RX ORDER — INSULIN GLARGINE 100 [IU]/ML
25 INJECTION, SOLUTION SUBCUTANEOUS NIGHTLY
Status: DISCONTINUED | OUTPATIENT
Start: 2021-11-04 | End: 2021-11-13 | Stop reason: HOSPADM

## 2021-11-04 RX ORDER — POLYETHYLENE GLYCOL 3350 17 G/17G
17 POWDER, FOR SOLUTION ORAL DAILY PRN
Status: DISCONTINUED | OUTPATIENT
Start: 2021-11-04 | End: 2021-11-13 | Stop reason: HOSPADM

## 2021-11-04 RX ORDER — ASPIRIN 81 MG/1
81 TABLET ORAL DAILY
Status: DISCONTINUED | OUTPATIENT
Start: 2021-11-04 | End: 2021-11-13 | Stop reason: HOSPADM

## 2021-11-04 RX ORDER — HYDROXYZINE HYDROCHLORIDE 10 MG/1
25 TABLET, FILM COATED ORAL 3 TIMES DAILY PRN
Status: DISCONTINUED | OUTPATIENT
Start: 2021-11-04 | End: 2021-11-04 | Stop reason: SDUPTHER

## 2021-11-04 RX ORDER — SODIUM CHLORIDE 9 MG/ML
INJECTION, SOLUTION INTRAVENOUS CONTINUOUS
Status: DISCONTINUED | OUTPATIENT
Start: 2021-11-04 | End: 2021-11-05

## 2021-11-04 RX ORDER — POTASSIUM CHLORIDE 20 MEQ/1
40 TABLET, EXTENDED RELEASE ORAL PRN
Status: DISCONTINUED | OUTPATIENT
Start: 2021-11-04 | End: 2021-11-13 | Stop reason: HOSPADM

## 2021-11-04 RX ORDER — SODIUM CHLORIDE 0.9 % (FLUSH) 0.9 %
10 SYRINGE (ML) INJECTION PRN
Status: DISCONTINUED | OUTPATIENT
Start: 2021-11-04 | End: 2021-11-13 | Stop reason: HOSPADM

## 2021-11-04 RX ORDER — ACETAMINOPHEN 650 MG/1
650 SUPPOSITORY RECTAL EVERY 6 HOURS PRN
Status: DISCONTINUED | OUTPATIENT
Start: 2021-11-04 | End: 2021-11-13 | Stop reason: HOSPADM

## 2021-11-04 RX ORDER — DEXTROSE MONOHYDRATE 50 MG/ML
100 INJECTION, SOLUTION INTRAVENOUS PRN
Status: DISCONTINUED | OUTPATIENT
Start: 2021-11-04 | End: 2021-11-13 | Stop reason: HOSPADM

## 2021-11-04 RX ORDER — NICOTINE POLACRILEX 4 MG
15 LOZENGE BUCCAL PRN
Status: DISCONTINUED | OUTPATIENT
Start: 2021-11-04 | End: 2021-11-13 | Stop reason: HOSPADM

## 2021-11-04 RX ORDER — HYDROCODONE BITARTRATE AND ACETAMINOPHEN 5; 325 MG/1; MG/1
2 TABLET ORAL ONCE
Status: COMPLETED | OUTPATIENT
Start: 2021-11-04 | End: 2021-11-04

## 2021-11-04 RX ORDER — HYDROXYZINE HYDROCHLORIDE 25 MG/1
25 TABLET, FILM COATED ORAL 3 TIMES DAILY PRN
Status: DISCONTINUED | OUTPATIENT
Start: 2021-11-04 | End: 2021-11-13 | Stop reason: HOSPADM

## 2021-11-04 RX ORDER — POTASSIUM CHLORIDE 7.45 MG/ML
10 INJECTION INTRAVENOUS PRN
Status: DISCONTINUED | OUTPATIENT
Start: 2021-11-04 | End: 2021-11-13 | Stop reason: HOSPADM

## 2021-11-04 RX ORDER — SODIUM CHLORIDE 0.9 % (FLUSH) 0.9 %
5-40 SYRINGE (ML) INJECTION EVERY 12 HOURS SCHEDULED
Status: DISCONTINUED | OUTPATIENT
Start: 2021-11-04 | End: 2021-11-13 | Stop reason: HOSPADM

## 2021-11-04 RX ORDER — ONDANSETRON 2 MG/ML
4 INJECTION INTRAMUSCULAR; INTRAVENOUS EVERY 6 HOURS PRN
Status: DISCONTINUED | OUTPATIENT
Start: 2021-11-04 | End: 2021-11-13 | Stop reason: HOSPADM

## 2021-11-04 RX ORDER — M-VIT,TX,IRON,MINS/CALC/FOLIC 27MG-0.4MG
1 TABLET ORAL DAILY
Status: DISCONTINUED | OUTPATIENT
Start: 2021-11-04 | End: 2021-11-13 | Stop reason: HOSPADM

## 2021-11-04 RX ORDER — DEXTROSE MONOHYDRATE 25 G/50ML
12.5 INJECTION, SOLUTION INTRAVENOUS PRN
Status: DISCONTINUED | OUTPATIENT
Start: 2021-11-04 | End: 2021-11-13 | Stop reason: HOSPADM

## 2021-11-04 RX ORDER — DOCUSATE SODIUM 100 MG/1
100 CAPSULE, LIQUID FILLED ORAL 2 TIMES DAILY
Status: DISCONTINUED | OUTPATIENT
Start: 2021-11-04 | End: 2021-11-13 | Stop reason: HOSPADM

## 2021-11-04 RX ORDER — FAMOTIDINE 20 MG/1
20 TABLET, FILM COATED ORAL 2 TIMES DAILY
Status: DISCONTINUED | OUTPATIENT
Start: 2021-11-04 | End: 2021-11-13 | Stop reason: HOSPADM

## 2021-11-04 RX ORDER — OXYCODONE HYDROCHLORIDE AND ACETAMINOPHEN 5; 325 MG/1; MG/1
1 TABLET ORAL EVERY 4 HOURS PRN
Status: DISCONTINUED | OUTPATIENT
Start: 2021-11-04 | End: 2021-11-13 | Stop reason: HOSPADM

## 2021-11-04 RX ORDER — TAMSULOSIN HYDROCHLORIDE 0.4 MG/1
0.4 CAPSULE ORAL NIGHTLY
Status: DISCONTINUED | OUTPATIENT
Start: 2021-11-04 | End: 2021-11-13 | Stop reason: HOSPADM

## 2021-11-04 RX ORDER — LANOLIN ALCOHOL/MO/W.PET/CERES
325 CREAM (GRAM) TOPICAL 2 TIMES DAILY
Status: DISCONTINUED | OUTPATIENT
Start: 2021-11-04 | End: 2021-11-13 | Stop reason: HOSPADM

## 2021-11-04 RX ADMIN — OXYCODONE AND ACETAMINOPHEN 1 TABLET: 5; 325 TABLET ORAL at 16:58

## 2021-11-04 RX ADMIN — ATORVASTATIN CALCIUM 10 MG: 10 TABLET, FILM COATED ORAL at 22:39

## 2021-11-04 RX ADMIN — VANCOMYCIN HYDROCHLORIDE 2000 MG: 1 INJECTION, POWDER, LYOPHILIZED, FOR SOLUTION INTRAVENOUS at 01:28

## 2021-11-04 RX ADMIN — ATORVASTATIN CALCIUM 10 MG: 10 TABLET, FILM COATED ORAL at 01:25

## 2021-11-04 RX ADMIN — POTASSIUM CHLORIDE 40 MEQ: 20 TABLET, EXTENDED RELEASE ORAL at 09:55

## 2021-11-04 RX ADMIN — INSULIN LISPRO 2 UNITS: 100 INJECTION, SOLUTION INTRAVENOUS; SUBCUTANEOUS at 01:32

## 2021-11-04 RX ADMIN — TAMSULOSIN HYDROCHLORIDE 0.4 MG: 0.4 CAPSULE ORAL at 22:39

## 2021-11-04 RX ADMIN — METOPROLOL TARTRATE 25 MG: 25 TABLET, FILM COATED ORAL at 09:22

## 2021-11-04 RX ADMIN — INSULIN LISPRO 6 UNITS: 100 INJECTION, SOLUTION INTRAVENOUS; SUBCUTANEOUS at 16:59

## 2021-11-04 RX ADMIN — HYDROCODONE BITARTRATE AND ACETAMINOPHEN 2 TABLET: 5; 325 TABLET ORAL at 01:24

## 2021-11-04 RX ADMIN — INSULIN LISPRO 2 UNITS: 100 INJECTION, SOLUTION INTRAVENOUS; SUBCUTANEOUS at 22:39

## 2021-11-04 RX ADMIN — PROBIOTIC PRODUCT - TAB 1 TABLET: TAB at 22:37

## 2021-11-04 RX ADMIN — APIXABAN 5 MG: 5 TABLET, FILM COATED ORAL at 09:22

## 2021-11-04 RX ADMIN — OXYCODONE AND ACETAMINOPHEN 1 TABLET: 5; 325 TABLET ORAL at 22:38

## 2021-11-04 RX ADMIN — ASPIRIN 81 MG: 81 TABLET, COATED ORAL at 09:22

## 2021-11-04 RX ADMIN — INSULIN LISPRO 2 UNITS: 100 INJECTION, SOLUTION INTRAVENOUS; SUBCUTANEOUS at 13:04

## 2021-11-04 RX ADMIN — INSULIN LISPRO 2 UNITS: 100 INJECTION, SOLUTION INTRAVENOUS; SUBCUTANEOUS at 09:21

## 2021-11-04 RX ADMIN — INSULIN GLARGINE 25 UNITS: 100 INJECTION, SOLUTION SUBCUTANEOUS at 01:32

## 2021-11-04 RX ADMIN — FAMOTIDINE 20 MG: 20 TABLET ORAL at 09:22

## 2021-11-04 RX ADMIN — AMITRIPTYLINE HYDROCHLORIDE 75 MG: 50 TABLET, FILM COATED ORAL at 01:25

## 2021-11-04 RX ADMIN — PROBIOTIC PRODUCT - TAB 1 TABLET: TAB at 09:21

## 2021-11-04 RX ADMIN — DOCUSATE SODIUM 100 MG: 100 CAPSULE, LIQUID FILLED ORAL at 22:37

## 2021-11-04 RX ADMIN — METOPROLOL TARTRATE 25 MG: 25 TABLET, FILM COATED ORAL at 22:38

## 2021-11-04 RX ADMIN — INSULIN GLARGINE 25 UNITS: 100 INJECTION, SOLUTION SUBCUTANEOUS at 22:39

## 2021-11-04 RX ADMIN — CEFEPIME HYDROCHLORIDE 2000 MG: 2 INJECTION, POWDER, FOR SOLUTION INTRAVENOUS at 13:05

## 2021-11-04 RX ADMIN — METOPROLOL TARTRATE 25 MG: 25 TABLET, FILM COATED ORAL at 01:24

## 2021-11-04 RX ADMIN — TAMSULOSIN HYDROCHLORIDE 0.4 MG: 0.4 CAPSULE ORAL at 01:24

## 2021-11-04 RX ADMIN — DOCUSATE SODIUM 100 MG: 100 CAPSULE, LIQUID FILLED ORAL at 09:22

## 2021-11-04 RX ADMIN — FAMOTIDINE 20 MG: 20 TABLET ORAL at 22:38

## 2021-11-04 RX ADMIN — CEFEPIME HYDROCHLORIDE 2000 MG: 2 INJECTION, POWDER, FOR SOLUTION INTRAVENOUS at 00:28

## 2021-11-04 RX ADMIN — FERROUS SULFATE TAB EC 325 MG (65 MG FE EQUIVALENT) 325 MG: 325 (65 FE) TABLET DELAYED RESPONSE at 09:22

## 2021-11-04 RX ADMIN — SODIUM CHLORIDE: 9 INJECTION, SOLUTION INTRAVENOUS at 13:48

## 2021-11-04 RX ADMIN — FERROUS SULFATE TAB EC 325 MG (65 MG FE EQUIVALENT) 325 MG: 325 (65 FE) TABLET DELAYED RESPONSE at 22:38

## 2021-11-04 RX ADMIN — AMITRIPTYLINE HYDROCHLORIDE 75 MG: 50 TABLET, FILM COATED ORAL at 22:38

## 2021-11-04 RX ADMIN — SODIUM CHLORIDE: 9 INJECTION, SOLUTION INTRAVENOUS at 00:29

## 2021-11-04 RX ADMIN — Medication 1 TABLET: at 09:21

## 2021-11-04 RX ADMIN — APIXABAN 5 MG: 5 TABLET, FILM COATED ORAL at 01:25

## 2021-11-04 RX ADMIN — APIXABAN 5 MG: 5 TABLET, FILM COATED ORAL at 22:39

## 2021-11-04 ASSESSMENT — PAIN DESCRIPTION - ONSET: ONSET: ON-GOING

## 2021-11-04 ASSESSMENT — ENCOUNTER SYMPTOMS
BLOOD IN STOOL: 0
COLOR CHANGE: 0
COUGH: 0
VOMITING: 0
COLOR CHANGE: 1
NAUSEA: 0
EYE REDNESS: 0
CHEST TIGHTNESS: 0
SHORTNESS OF BREATH: 0
DIARRHEA: 0
EYE DISCHARGE: 0
WHEEZING: 0
CONSTIPATION: 0
ABDOMINAL PAIN: 0

## 2021-11-04 ASSESSMENT — PAIN SCALES - GENERAL
PAINLEVEL_OUTOF10: 9
PAINLEVEL_OUTOF10: 8
PAINLEVEL_OUTOF10: 7
PAINLEVEL_OUTOF10: 8
PAINLEVEL_OUTOF10: 5
PAINLEVEL_OUTOF10: 10

## 2021-11-04 ASSESSMENT — PAIN DESCRIPTION - ORIENTATION: ORIENTATION: LEFT

## 2021-11-04 ASSESSMENT — PAIN DESCRIPTION - LOCATION: LOCATION: LEG

## 2021-11-04 ASSESSMENT — PAIN - FUNCTIONAL ASSESSMENT: PAIN_FUNCTIONAL_ASSESSMENT: PREVENTS OR INTERFERES SOME ACTIVE ACTIVITIES AND ADLS

## 2021-11-04 ASSESSMENT — PAIN DESCRIPTION - PAIN TYPE: TYPE: ACUTE PAIN

## 2021-11-04 ASSESSMENT — PAIN DESCRIPTION - FREQUENCY: FREQUENCY: CONTINUOUS

## 2021-11-04 ASSESSMENT — PAIN DESCRIPTION - PROGRESSION: CLINICAL_PROGRESSION: GRADUALLY WORSENING
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
## 2021-11-04 NOTE — PLAN OF CARE
Problem: Falls - Risk of:  Goal: Will remain free from falls  Description: Will remain free from falls  Outcome: Ongoing  Note: Proper pt identification  Hourly rounding performed  Anticipatory needs met  Non-skid socks worn  Proper transferring technique  2/4 side rails up  Personal necessities within reach  Bed low and locked  Call light in reach  Proper lighting  Room free of clutter  Continue to monitor     Problem: Skin Integrity:  Goal: Demonstration of wound healing without infection will improve  Description: Demonstration of wound healing without infection will improve  Outcome: Ongoing  Note: VSS  Pt afebrile  Pt receiving IV Antibiotics
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
## 2021-11-04 NOTE — CONSULTS
Vancomycin Dosing by Pharmacy - Initial Note   TODAY'S DATE:  11/4/2021  Patient name, age:  Isidra Faustin, 61 y.o. Consulting provider: Dr. Sally Hebert    Indication: cellulitis  Additional antimicrobials:  cefepime    Actual Weight:    Wt Readings from Last 1 Encounters:   11/03/21 165 lb (74.8 kg)     Labs/Ancillary Data  Estimated Creatinine Clearance: 83 mL/min (based on SCr of 0.95 mg/dL). Recent Labs     11/03/21 1903 11/03/21 2057   CREATININE  --  0.95   BUN  --  23   WBC 17.4*  --      Procalcitonin   Date Value Ref Range Status   05/25/2021 0.13 (H) <0.09 ng/mL Final     Comment:           Suspected Sepsis:  <0.50 ng/mL     Low likelihood of sepsis. 0.50-2.00 ng/mL     Increased likelihood of sepsis. Antibiotics encouraged. >2.00 ng/mL     High risk of sepsis/shock. Antibiotics strongly encouraged. Suspected Lower Resp Tract Infections:  <0.24 ng/mL     Low likelihood of bacterial infection. >0.24 ng/mL     Increased likelihood of bacterial infection. Antibiotics encouraged. With successful antibiotic therapy, PCT levels should decrease rapidly. (Half-life of 24 to   36 hours.)        Procalcitonin values from samples collected within the first 6 hours of systemic infection   may still be low. Retesting may be indicated. Values from day 1 and day 4 can be entered into the Change in Procalcitonin Calculator   (www.Pollfishs-pct-[email or web address] fotopedia) to determine the patient's Mortality Risk Prognosis        In healthy neonates, plasma Procalcitonin (PCT) concentrations increase gradually after   birth, reaching peak values at about 24 hours of age then decrease to normal values below   0.5 ng/mL by 48-72 hours of age. Temp: 99.3  F    Pertinent Cultures  Urine, skin, blood x 2 cultures sent to lab    MRSA Nasal Swab: N/A. Non-respiratory infection. Kavon Kumar PLAN   Initial loading dose of 25mg/kg (max of 2,500 mg) = 2000  mg  x 1, then  vancomycin 1000 mg IV every 12 hours.     Ensured BUN/sCr ordered at baseline and every 48 hours x at least 3 levels, then at least weekly. Vancomycin level ordered for 11/5/21 @ 1300. Will use bayesian method for dosing. This level will not be a trough. Target AUC/DARIAN 400-500, with trough goal estimate of 10-15. Vancomycin Target Concentration Parameters  Treatment  Population Target AUC/DARIAN Target Trough   Invasive MRSA Infection (bacteremia, pneumonia, meningitis, endocarditis, osteomyelitis)  Sepsis (undifferentiated) 400-600 N/A   Infection due to non-MRSA pathogen  Empiric treatment of non-invasive MRSA infection  (SSTI, UTI) <500 10-15 mg/L   CrCl < 29 mL/min  Rapidly fluctuating serum creatinine   LUMA N/A < 15 mg/L     Renal replacement therapy is dosed by levels, per hospital protocol. Abbreviations  * Pauc: probability that AUC is >400 (efficacy); Pconc: probability that Ctrough is above 20 ?g/mL (toxicity); Tox: Probability of nephrotoxicity, based on Marquis et al. Clin Infect Dis 2009. Thank you for the consult. Pharmacy will continue to follow.
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
## 2021-11-04 NOTE — PROGRESS NOTES
Pt admitted to room 2012 per cart in stable condition from ER. Oriented to room and surroundings  Bed in lowest position, wheels locked, 2/4 side rails up  Call light in reach, room free of clutter, adequate lighting provided  Denies any further questions at this time  Instructed to call out with any questions/concerns/new onset of pain and/or n/v   White board updated  Continue to monitor with hourly rounding  STAY WITH ME protocol initiated   Bed alarm on/Fall Risk signs in place/Fall risk sticker to wrist band  Non-skid socks on/at bedside  [address] in place for patient/family to view & ask questions.

## 2021-11-04 NOTE — CARE COORDINATION
Case Management Initial Discharge [address],             Met with:patient or family member patient and daughter to discuss discharge plans. Information verified: address, contacts, phone number, , insurance Yes  PCP: MARCELO Lucia CNP  Date of last visit: 2021 (new patient)    Insurance Provider: [facility]    Discharge Planning    Living Arrangements:  Alone   Support Systems:  Family Members, Friends/Neighbors    Home has 1 stories  0 stairs to climb to get into front door, 0 stairs to climb to reach second floor  Location of bedroom/bathroom in home  - main floor    Patient able to perform ADL's:Assisted    Current Services (outpatient & in home) SN, PT/OT   DME equipment: W/C, walker, raised toilet seat, rt leg prosthesis  DME provider: N/A    Pharmacy: [address]    Potential Assistance Needed:  Home Care vs SNF    Patient agreeable to home care: Yes  Freedom of choice provided:  yes    Prior SNF/Rehab Placement and Facility: [place] [address] [address] [address], [facility], [address] [place], [place] [address]Th [place] to SNF/Rehab: Yes  [place] of choice provided: yes   Evaluation: yes    Expected Discharge date:  21  Patient expects to be discharged to: Follow Up Appointment: Best Day/ Time: Monday AM    Transportation provider: DOE  Transportation arrangements needed for discharge: Yes    Readmission Risk              Risk of Unplanned Readmission:  62             Does patient have a readmission risk score greater than 14?: Yes  If yes, follow-up appointment must be made within 7 days of discharge. Goal of Care:       Discharge Plan: Met with patient and spoke to daughter Morales Hector on speaker phone in room. Dtr lives in [place]. Pt lives alone and admitted for lt BKA stump cellulitis. Vascular and ID consulted. Currently on IV Vanco and Maxipime.     Has had admissions for multiple infections and has history of chronic opiate use. Has bilateral BKA's and has rt leg prosthesis. Mainly uses W/C at home. Gets meals delivered daily and dtr recently set up [employer] for medical transportation. Has also been in contact with [facility] on Aging for other services. Current with [facility] for SN, PT/OT. Dtr stated they have been trying to D/C him for 2 weeks. Spoke to Viola with [facility] and she feels pt needs SNF and is not safe at home. [facility] has called APS and Viola stated pt is non compliant with nursing and therapy. They will resume services as last resort if needed. Therapy ordered today and recommendations pending. Spoke to Yelena in therapy and they will evaluate Friday. Pt states not ready to make decision about HC vs SNF and dtr offered to take him to [place] but pt cannot decide today. Dtr Jose Luis Castro would like to be called with updates regarding D/C plan prior to pt leaving.                 Electronically signed by Shelli Harris RN on 11/4/21 at 3:32 PM EDT
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
## 2021-11-05 LAB
ANION GAP SERPL CALCULATED.3IONS-SCNC: 9 MMOL/L (ref 9–17)
BUN BLDV-MCNC: 17 MG/DL (ref 8–23)
BUN/CREAT BLD: 18 (ref 9–20)
CALCIUM SERPL-MCNC: 8.5 MG/DL (ref 8.6–10.4)
CHLORIDE BLD-SCNC: 106 MMOL/L (ref 98–107)
CO2: 22 MMOL/L (ref 20–31)
CREAT SERPL-MCNC: 0.92 MG/DL (ref 0.7–1.2)
GFR AFRICAN AMERICAN: >60 ML/MIN
GFR NON-AFRICAN AMERICAN: >60 ML/MIN
GFR SERPL CREATININE-BSD FRML MDRD: ABNORMAL ML/MIN/{1.73_M2}
GFR SERPL CREATININE-BSD FRML MDRD: ABNORMAL ML/MIN/{1.73_M2}
GLUCOSE BLD-MCNC: 134 MG/DL (ref 75–110)
GLUCOSE BLD-MCNC: 195 MG/DL (ref 75–110)
GLUCOSE BLD-MCNC: 211 MG/DL (ref 75–110)
GLUCOSE BLD-MCNC: 265 MG/DL (ref 70–99)
GLUCOSE BLD-MCNC: 286 MG/DL (ref 75–110)
HCT VFR BLD CALC: 31.3 % (ref 40.7–50.3)
HEMOGLOBIN: 9.1 G/DL (ref 13–17)
MCH RBC QN AUTO: 26 PG (ref 25.2–33.5)
MCHC RBC AUTO-ENTMCNC: 29.1 G/DL (ref 28.4–34.8)
MCV RBC AUTO: 89.4 FL (ref 82.6–102.9)
NRBC AUTOMATED: 0 PER 100 WBC
PDW BLD-RTO: 15 % (ref 11.8–14.4)
PLATELET # BLD: 439 K/UL (ref 138–453)
PMV BLD AUTO: 9.5 FL (ref 8.1–13.5)
POTASSIUM SERPL-SCNC: 4.4 MMOL/L (ref 3.7–5.3)
RBC # BLD: 3.5 M/UL (ref 4.21–5.77)
SODIUM BLD-SCNC: 137 MMOL/L (ref 135–144)
VANCOMYCIN TROUGH DATE LAST DOSE: NORMAL
VANCOMYCIN TROUGH DOSE AMOUNT: NORMAL
VANCOMYCIN TROUGH TIME LAST DOSE: NORMAL
VANCOMYCIN TROUGH: 18.5 UG/ML (ref 10–20)
WBC # BLD: 10.8 K/UL (ref 3.5–11.3)

## 2021-11-05 PROCEDURE — 6370000000 HC RX 637 (ALT 250 FOR IP): Performed by: NURSE PRACTITIONER

## 2021-11-05 PROCEDURE — 97167 OT EVAL HIGH COMPLEX 60 MIN: CPT

## 2021-11-05 PROCEDURE — 99232 SBSQ HOSP IP/OBS MODERATE 35: CPT | Performed by: SURGERY

## 2021-11-05 PROCEDURE — 80048 BASIC METABOLIC PNL TOTAL CA: CPT

## 2021-11-05 PROCEDURE — 99232 SBSQ HOSP IP/OBS MODERATE 35: CPT | Performed by: INTERNAL MEDICINE

## 2021-11-05 PROCEDURE — 1200000000 HC SEMI PRIVATE

## 2021-11-05 PROCEDURE — 97530 THERAPEUTIC ACTIVITIES: CPT

## 2021-11-05 PROCEDURE — 85027 COMPLETE CBC AUTOMATED: CPT

## 2021-11-05 PROCEDURE — 6360000002 HC RX W HCPCS: Performed by: STUDENT IN AN ORGANIZED HEALTH CARE EDUCATION/TRAINING PROGRAM

## 2021-11-05 PROCEDURE — 36415 COLL VENOUS BLD VENIPUNCTURE: CPT

## 2021-11-05 PROCEDURE — 2580000003 HC RX 258: Performed by: NURSE PRACTITIONER

## 2021-11-05 PROCEDURE — 97535 SELF CARE MNGMENT TRAINING: CPT

## 2021-11-05 PROCEDURE — 80202 ASSAY OF VANCOMYCIN: CPT

## 2021-11-05 PROCEDURE — 2580000003 HC RX 258: Performed by: STUDENT IN AN ORGANIZED HEALTH CARE EDUCATION/TRAINING PROGRAM

## 2021-11-05 PROCEDURE — 6360000002 HC RX W HCPCS: Performed by: NURSE PRACTITIONER

## 2021-11-05 PROCEDURE — 97112 NEUROMUSCULAR REEDUCATION: CPT

## 2021-11-05 PROCEDURE — 6360000002 HC RX W HCPCS: Performed by: INTERNAL MEDICINE

## 2021-11-05 PROCEDURE — 82947 ASSAY GLUCOSE BLOOD QUANT: CPT

## 2021-11-05 PROCEDURE — 6370000000 HC RX 637 (ALT 250 FOR IP): Performed by: INTERNAL MEDICINE

## 2021-11-05 PROCEDURE — 97163 PT EVAL HIGH COMPLEX 45 MIN: CPT

## 2021-11-05 RX ORDER — MORPHINE SULFATE 2 MG/ML
2 INJECTION, SOLUTION INTRAMUSCULAR; INTRAVENOUS EVERY 4 HOURS PRN
Status: DISCONTINUED | OUTPATIENT
Start: 2021-11-05 | End: 2021-11-13 | Stop reason: HOSPADM

## 2021-11-05 RX ADMIN — ASPIRIN 81 MG: 81 TABLET, COATED ORAL at 09:22

## 2021-11-05 RX ADMIN — PROBIOTIC PRODUCT - TAB 1 TABLET: TAB at 09:28

## 2021-11-05 RX ADMIN — FAMOTIDINE 20 MG: 20 TABLET ORAL at 09:23

## 2021-11-05 RX ADMIN — FERROUS SULFATE TAB EC 325 MG (65 MG FE EQUIVALENT) 325 MG: 325 (65 FE) TABLET DELAYED RESPONSE at 09:23

## 2021-11-05 RX ADMIN — DOCUSATE SODIUM 100 MG: 100 CAPSULE, LIQUID FILLED ORAL at 19:33

## 2021-11-05 RX ADMIN — METOPROLOL TARTRATE 25 MG: 25 TABLET, FILM COATED ORAL at 09:23

## 2021-11-05 RX ADMIN — CEFEPIME HYDROCHLORIDE 2000 MG: 2 INJECTION, POWDER, FOR SOLUTION INTRAVENOUS at 12:41

## 2021-11-05 RX ADMIN — Medication 1 TABLET: at 09:22

## 2021-11-05 RX ADMIN — OXYCODONE AND ACETAMINOPHEN 1 TABLET: 5; 325 TABLET ORAL at 09:23

## 2021-11-05 RX ADMIN — AMITRIPTYLINE HYDROCHLORIDE 75 MG: 50 TABLET, FILM COATED ORAL at 19:34

## 2021-11-05 RX ADMIN — Medication 2 MG: at 21:55

## 2021-11-05 RX ADMIN — CEFEPIME HYDROCHLORIDE 2000 MG: 2 INJECTION, POWDER, FOR SOLUTION INTRAVENOUS at 00:37

## 2021-11-05 RX ADMIN — FAMOTIDINE 20 MG: 20 TABLET ORAL at 19:33

## 2021-11-05 RX ADMIN — INSULIN GLARGINE 25 UNITS: 100 INJECTION, SOLUTION SUBCUTANEOUS at 21:55

## 2021-11-05 RX ADMIN — METOPROLOL TARTRATE 25 MG: 25 TABLET, FILM COATED ORAL at 19:34

## 2021-11-05 RX ADMIN — Medication 2 MG: at 17:53

## 2021-11-05 RX ADMIN — PROBIOTIC PRODUCT - TAB 1 TABLET: TAB at 19:34

## 2021-11-05 RX ADMIN — VANCOMYCIN HYDROCHLORIDE 750 MG: 750 INJECTION, POWDER, LYOPHILIZED, FOR SOLUTION INTRAVENOUS at 17:52

## 2021-11-05 RX ADMIN — INSULIN LISPRO 4 UNITS: 100 INJECTION, SOLUTION INTRAVENOUS; SUBCUTANEOUS at 09:24

## 2021-11-05 RX ADMIN — APIXABAN 5 MG: 5 TABLET, FILM COATED ORAL at 09:23

## 2021-11-05 RX ADMIN — HYDROXYZINE HYDROCHLORIDE 25 MG: 25 TABLET, FILM COATED ORAL at 00:37

## 2021-11-05 RX ADMIN — APIXABAN 5 MG: 5 TABLET, FILM COATED ORAL at 19:33

## 2021-11-05 RX ADMIN — INSULIN LISPRO 2 UNITS: 100 INJECTION, SOLUTION INTRAVENOUS; SUBCUTANEOUS at 17:46

## 2021-11-05 RX ADMIN — OXYCODONE AND ACETAMINOPHEN 1 TABLET: 5; 325 TABLET ORAL at 05:17

## 2021-11-05 RX ADMIN — INSULIN LISPRO 3 UNITS: 100 INJECTION, SOLUTION INTRAVENOUS; SUBCUTANEOUS at 21:55

## 2021-11-05 RX ADMIN — TAMSULOSIN HYDROCHLORIDE 0.4 MG: 0.4 CAPSULE ORAL at 19:34

## 2021-11-05 RX ADMIN — OXYCODONE AND ACETAMINOPHEN 1 TABLET: 5; 325 TABLET ORAL at 19:34

## 2021-11-05 RX ADMIN — DOCUSATE SODIUM 100 MG: 100 CAPSULE, LIQUID FILLED ORAL at 09:22

## 2021-11-05 RX ADMIN — ATORVASTATIN CALCIUM 10 MG: 10 TABLET, FILM COATED ORAL at 19:35

## 2021-11-05 RX ADMIN — FERROUS SULFATE TAB EC 325 MG (65 MG FE EQUIVALENT) 325 MG: 325 (65 FE) TABLET DELAYED RESPONSE at 19:34

## 2021-11-05 ASSESSMENT — PAIN DESCRIPTION - FREQUENCY
FREQUENCY: CONTINUOUS

## 2021-11-05 ASSESSMENT — PAIN DESCRIPTION - PAIN TYPE
TYPE: ACUTE PAIN

## 2021-11-05 ASSESSMENT — PAIN DESCRIPTION - LOCATION
LOCATION: LEG

## 2021-11-05 ASSESSMENT — PAIN DESCRIPTION - DESCRIPTORS
DESCRIPTORS: PATIENT UNABLE TO DESCRIBE

## 2021-11-05 ASSESSMENT — PAIN DESCRIPTION - PROGRESSION
CLINICAL_PROGRESSION: NOT CHANGED

## 2021-11-05 ASSESSMENT — PAIN SCALES - GENERAL
PAINLEVEL_OUTOF10: 8
PAINLEVEL_OUTOF10: 8
PAINLEVEL_OUTOF10: 7
PAINLEVEL_OUTOF10: 8

## 2021-11-05 ASSESSMENT — PAIN - FUNCTIONAL ASSESSMENT: PAIN_FUNCTIONAL_ASSESSMENT: PREVENTS OR INTERFERES SOME ACTIVE ACTIVITIES AND ADLS

## 2021-11-05 ASSESSMENT — ENCOUNTER SYMPTOMS
GASTROINTESTINAL NEGATIVE: 1
ALLERGIC/IMMUNOLOGIC NEGATIVE: 1
RESPIRATORY NEGATIVE: 1

## 2021-11-05 ASSESSMENT — PAIN DESCRIPTION - ONSET
ONSET: ON-GOING

## 2021-11-05 ASSESSMENT — PAIN DESCRIPTION - ORIENTATION
ORIENTATION: LEFT

## 2021-11-05 NOTE — CARE COORDINATION
Social work: spoke to pt today and tried to reach seth hart who was not answering her phone and her voicemail is full. Pt states he wants to discuss with his daughter his snf options. She has offered him a chance to come to [place] with daughter. Pt has been to the followings snf who will not allow pt to return and he does not wish to returnn to any of the following: [facility], [email or web address], [facility], [facility], [facility], [address] [place] [address],, and [facility]. Therefore in his insurance networks the list that is available on line states that  [facility] snf, [facility] and [employer]  are in network and his is able to select from this group of snf's. He states he cannot make a decision until he discusses with daughter. Not able to reach her today. Not able to leave her a message. Will need philip, Rx and discharge order for snf at discharge. Dung camacho.

## 2021-11-05 NOTE — PROGRESS NOTES
Pt noncompliant with safety measures. Up and ambulating on stumps. Bed alarm on, writer got to room and pt already up to bedside commode.

## 2021-11-05 NOTE — PLAN OF CARE
Problem: Falls - Risk of:  Goal: Will remain free from falls  Description: Will remain free from falls  11/5/2021 1218 by Francisco Cannon RN  Outcome: Ongoing  Note: Proper pt identification  Hourly rounding performed  Anticipatory needs met  Problem: Pain:  Goal: Pain level will decrease  Description: Pain level will decrease  11/5/2021 1218 by Francisco Cannon RN  Outcome: Ongoing  Note: Pain assessed upon established hourly rounding  Pt given pain medication for breakthrough pain with relief     Proper transferring technique  2/4 side rails up  Personal necessities within reach  Bed low and locked  Call light in reach  Proper lighting  Room free of clutter  Continue to monitor
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
## 2021-11-05 NOTE — PLAN OF CARE
Problem: Falls - Risk of:  Goal: Will remain free from falls  Description: Will remain free from falls  11/5/2021 0153 by Soheila Atkinson RN  Outcome: Ongoing     Problem: Falls - Risk of:  Goal: Absence of physical injury  Description: Absence of physical injury  Outcome: Ongoing     Problem: Pain:  Goal: Pain level will decrease  Description: Pain level will decrease  Outcome: Ongoing     Problem: Pain:  Goal: Control of acute pain  Description: Control of acute pain  Outcome: Ongoing     Problem: Pain:  Goal: Control of chronic pain  Description: Control of chronic pain  Outcome: Ongoing     Problem: Skin Integrity:  Goal: Demonstration of wound healing without infection will improve  Description: Demonstration of wound healing without infection will improve  11/5/2021 0153 by Soheila Atkinson RN  Outcome: Ongoing
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
## 2021-11-05 NOTE — PROGRESS NOTES
Vancomycin Dosing by Pharmacy - Daily Note   Vancomycin Therapy Day:  2  Indication: concern for osteomyelitis, left BKA stump wound    Allergies:  Gabapentin and Other   Actual Weight:    Wt Readings from Last 1 Encounters:   11/05/21 157 lb 4 oz (71.3 kg)       Labs/Ancillary Data  Estimated Creatinine Clearance: 82 mL/min (based on SCr of 0.92 mg/dL). Recent Labs     11/03/21  1903 11/03/21  2057 11/04/21  0659 11/05/21  0617   CREATININE  --  0.95 0.87 0.92   BUN  --  23 20 17   WBC 17.4*  --  12.3* 10.8     Procalcitonin   Date Value Ref Range Status   05/25/2021 0.13 (H) <0.09 ng/mL Final     Comment:           Suspected Sepsis:  <0.50 ng/mL     Low likelihood of sepsis. 0.50-2.00 ng/mL     Increased likelihood of sepsis. Antibiotics encouraged. >2.00 ng/mL     High risk of sepsis/shock. Antibiotics strongly encouraged. Suspected Lower Resp Tract Infections:  <0.24 ng/mL     Low likelihood of bacterial infection. >0.24 ng/mL     Increased likelihood of bacterial infection. Antibiotics encouraged. With successful antibiotic therapy, PCT levels should decrease rapidly. (Half-life of 24 to   36 hours.)        Procalcitonin values from samples collected within the first 6 hours of systemic infection   may still be low. Retesting may be indicated. Values from day 1 and day 4 can be entered into the Change in Procalcitonin Calculator   (www.[employer]-pct-calculator. [employer]) to determine the patient's Mortality Risk Prognosis        In healthy neonates, plasma Procalcitonin (PCT) concentrations increase gradually after   birth, reaching peak values at about 24 hours of age then decrease to normal values below   0.5 ng/mL by 48-72 hours of age.          Intake/Output Summary (Last 24 hours) at 11/5/2021 1412  Last data filed at 11/5/2021 1343  Gross per 24 hour   Intake 960 ml   Output 810 ml   Net 150 ml     Temp: 99    Culture Date / Source  /  Results  99  Recent vancomycin administrations vancomycin 1000 mg IVPB in 250 mL D5W addavial (mg) 1,000 mg New Bag 11/05/21 0446     1,000 mg New Bag 11/04/21 1721    vancomycin (VANCOCIN) 2,000 mg in dextrose 5 % 500 mL IVPB (mg) 2,000 mg New Bag 11/04/21 0128                  Vanco Random: No results for input(s): VANCORANDOM in the last 72 hours. Vanco Trough:   Recent Labs     11/05/21  1305   VANCOTROUGH 18.5     MRSA Nasal Swab: N/A. Non-respiratory infection. Debora Yan PLAN   Level of 18.5 this afternoon is predicting AUC/DARIAN of 586 with 15% chance of nephrotoxicity. I will decrease dose slightly to 750mg q12h which predicts AUC/DARIAN of 445 and 10% tox risk      Vancomycin Target Concentration Parameters  Treatment  Population Target AUC/DARIAN Target Trough   Invasive MRSA Infection (bacteremia, pneumonia, meningitis, endocarditis, osteomyelitis)  Sepsis (undifferentiated) 400-600 N/A   Infection due to non-MRSA pathogen  Empiric treatment of non-invasive MRSA infection  (SSTI, UTI) <500 10-15 mg/L   CrCl < 29 mL/min  Rapidly fluctuating serum creatinine   LUMA N/A < 15 mg/L     Renal replacement therapy is dosed by levels, per hospital protocol. Abbreviations  * Pauc: probability that AUC is >400 (efficacy); Pconc: probability that Ctrough is above 20 ?g/mL (toxicity); Tox: Probability of nephrotoxicity, based on Marquis et al. Clin Infect Dis 2009. Thank you for the consult. Pharmacy will continue to follow.

## 2021-11-05 NOTE — PROGRESS NOTES
[facility]  Progress Note    Torito Wynn  MRN: [id number]  Date: 11/5/2021  LOS: 2     Reason for F/U :   Left BKA stump infected hematoma and concern for osteomyelitis    Impression :   1. Left BKA stump traumatic wound with what appears to be a draining infected hematoma and surrounding cellulitis  2. Diabetes mellitus with associated neuropathy  3. COPD  4. Peripheral arterial disease  5. History of esophageal cancer    Recommendations:   · Continue intravenous antimicrobial therapy with cefepime and vancomycin. · The patient has Staph aureus isolated on the Gram stain also did have some gram-negative rods. · We will follow the culture data. · I do understand that there is a plan to place a wound VAC. · The patient will likely need discharge on IV antimicrobial therapy. Infection Control Recommendations:   Contact precautions    Discharge Planning:   Estimated Length of IV antimicrobials: To be determined  Patient will need Midline Catheter Insertion/ PICC line Insertion: No  [facility],  [facility],  LTAC: Undetermined  Patient willneed outpatient wound care: No    Medical Decision making / Summary of Stay:   Torito Wynn is a 61y.o.-year-old male who was initially admitted on 11/3/2021. April Narrow has a history of esophageal cancer, COPD, peripheral arterial disease, diabetes mellitus with associated neuropathy, hyperlipidemia, hypertension, multiple bouts of osteomyelitis in the lower extremities bilaterally and has since had bilateral below the knee amputations. The patient has bilateral stump wounds with some scabbed lesions and reports that he recently had trauma to the left BKA stump when he fell out of his wheelchair. The patient does have a history of infection of his BKA stump and in August 2021 was treated for osteomyelitis of the BKA stump with a combination of cefepime and vancomycin for 6 weeks.   The patient came into the emergency room for evaluation for worsening wound of the left stump without any associated fevers chills nausea or vomiting. The patient has an open wound of the left BKA site and with what seems to be a draining hematoma. He was seen by the vascular surgery team today and there is some concern for cellulitis he was started on antimicrobial therapy. He currently is doing local wound care and I was asked to evaluate and help with antibiotic choice. Current evaluation:2021    BP (!) 110/49   Pulse 68   Temp 97.4 °F (36.3 °C) (Oral)   Resp 18   Ht 6' 1\" (1.854 m)   Wt 157 lb 4 oz (71.3 kg)   SpO2 96%   BMI 20.75 kg/m²     Temperature Range: Temp: 97.4 °F (36.3 °C) Temp  Av °F (36.7 °C)  Min: 97.4 °F (36.3 °C)  Max: 99 °F (37.2 °C)  The patient is seen and evaluated at bedside and is awake and alert in no acute distress. He is feeling okay does not report any subjective fevers or chills. No cough or shortness of breath. No abdominal pain nausea vomiting or diarrhea. Still has some bleeding from the left BKA stump wound. Review of Systems   Constitutional: Negative. Respiratory: Negative. Cardiovascular: Negative. Gastrointestinal: Negative. Genitourinary: Negative. Musculoskeletal: Negative. Skin: Positive for wound. Allergic/Immunologic: Negative. Neurological: Negative. Physical Examination :     Physical Exam  Constitutional:       Appearance: He is well-developed. HENT:      Head: Normocephalic and atraumatic. Cardiovascular:      Rate and Rhythm: Normal rate. Heart sounds: Normal heart sounds. No friction rub. No gallop. Pulmonary:      Effort: Pulmonary effort is normal.      Breath sounds: Normal breath sounds. No wheezing. Abdominal:      General: Bowel sounds are normal.      Palpations: Abdomen is soft. There is no mass. Tenderness: There is no abdominal tenderness. Musculoskeletal:         General: Normal range of motion.       Cervical back: Normal range of motion and neck supple. Comments: Bilateral knee amputations. Left dressing not removed. Lymphadenopathy:      Cervical: No cervical adenopathy. Skin:     General: Skin is warm and dry. Neurological:      Mental Status: He is alert and oriented to person, place, and time. Laboratory data:   I have independently reviewed the followinglabs:  CBC with Differential:   Recent Labs     11/03/21  1903 11/03/21  1903 11/04/21  0659 11/05/21  0617   WBC 17.4*   < > 12.3* 10.8   HGB 12.3*   < > 9.3* 9.1*   HCT 37.4*   < > 30.9* 31.3*      < > 417 439   LYMPHOPCT 9*  --   --   --    MONOPCT 8  --   --   --     < > = values in this interval not displayed.      BMP:   Recent Labs     11/04/21  0659 11/05/21  0617    137   K 3.5* 4.4    106   CO2 21 22   BUN 20 17   CREATININE 0.87 0.92   MG 2.0  --      Hepatic Function Panel:   Recent Labs     11/03/21 2057   PROT 6.8   LABALBU 2.8*   BILITOT 0.13*   ALKPHOS 119   ALT 5   AST 6         Lab Results   Component Value Date    PROCAL 0.13 05/25/2021    PROCAL 0.11 05/25/2021     Lab Results   Component Value Date    .7 11/04/2021    CRP 91.6 04/05/2021    .0 02/03/2021     Lab Results   Component Value Date    SEDRATE 97 (H) 11/04/2021         Lab Results   Component Value Date    DDIMER 0.39 06/29/2020    DDIMER 1.63 12/20/2017    DDIMER 0.68 12/25/2011     Lab Results   Component Value Date    FERRITIN 56 02/09/2021    FERRITIN 64 01/25/2014     No results found for: LDH  No results found for: FIBRINOGEN    Results in Past 30 Days  Result Component Current Result Ref Range Previous Result Ref Range   SARS-CoV-2, Rapid Not Detected (11/3/2021) Not Detected Not in Time Range      Lab Results   Component Value Date    COVID19 Not Detected 11/03/2021    COVID19 Not Detected 09/02/2021    COVID19 Not Detected 08/29/2021    COVID19 Not Detected 08/23/2020       No results for input(s): JAYLAN in the last 72 hours.    Imaging Studies:   No new imaging    Cultures:     Culture, Blood 1 [[id number]] Collected: 11/04/21 0005   Order Status: Completed Specimen: Blood Updated: 11/05/21 1254    Specimen Description . BLOOD    Special Requests RT AC 10ML    Culture NO GROWTH 1 DAY   Culture, Blood 1 [[id number]] Collected: 11/04/21 0005   Order Status: Completed Specimen: Blood Updated: 11/05/21 1254    Specimen Description . BLOOD    Special Requests RT HAND 7ML    Culture NO GROWTH 1 DAY   Wound Culture [[id number]] (Abnormal) Collected: 11/04/21 0020   Order Status: Completed Specimen: No Site Given from Skin Updated: 11/05/21 1225    Specimen Description . SKIN LT LEG STUMP    Special Requests NOT REPORTED    Direct Exam MODERATE NEUTROPHILSAbnormal      FEW GRAM NEGATIVE RODSAbnormal      FEW GRAM POSITIVE COCCI IN CLUSTERSAbnormal     Culture STAPHYLOCOCCUS AUREUS MODERATE [address]      LACTOSE FERMENTING GRAM NEGATIVE RODS MODERATE GROWTHAbnormal    Culture, Anaerobic and Aerobic [[id number]] (Abnormal) Collected: 11/04/21 2145   Order Status: Completed Specimen: Abscess Updated: 11/05/21 0723    Specimen Description . ABSCESS    Special Requests NOT REPORTED    Direct Exam MANY NEUTROPHILSAbnormal      RARE GRAM POSITIVE COCCI IN CLUSTERSAbnormal     Culture PENDING   Culture, Urine [[id number]] Collected: 11/03/21 2104   Order Status: Completed Specimen: Urine, clean catch Updated: 11/04/21 2328    Specimen Description . CLEAN CATCH URINE    Special Requests NOT REPORTED    Culture NO SIGNIFICANT GROWTH     COVID-19, Rapid [[id number]] Collected: 11/03/21 2050   Order Status: Completed Specimen: Nasopharyngeal Swab Updated: 11/03/21 2127    Specimen Description . NASOPHARYNGEAL SWAB    SARS-CoV-2, Rapid Not Detected    Comment:        Rapid NAAT:  The specimen is NEGATIVE for SARS-CoV-2, the novel coronavirus associated with   COVID-19.         The ID NOW COVID-19 assay is designed to detect the virus that causes COVID-19 in patients   with signs and symptoms of infection who are suspected of COVID-19. An individual without symptoms of COVID-19 and who is not shedding SARS-CoV-2 virus would   expect to have a negative (not detected) result in this assay. Negative results should be treated as presumptive and, if inconsistent with clinical signs   and symptoms or necessary for patient management,   should be tested with an alternative molecular assay. Negative results do not preclude   SARS-CoV-2 infection and   should not be used as the sole basis for patient management decisions.         Fact sheet for Healthcare Providers: Lazaro   Fact sheet for Patients: Lazaro           Methodology: Isothermal Nucleic Acid Amplification               Medications:      amitriptyline  75 mg Oral Nightly    apixaban  5 mg Oral BID    aspirin EC  81 mg Oral Daily    atorvastatin  10 mg Oral Nightly    docusate sodium  100 mg Oral BID    famotidine  20 mg Oral BID    ferrous sulfate  325 mg Oral BID    insulin glargine  25 Units SubCUTAneous Nightly    lactobacillus  1 tablet Oral BID    metoprolol tartrate  25 mg Oral BID    therapeutic multivitamin-minerals  1 tablet Oral Daily    tamsulosin  0.4 mg Oral Nightly    sodium chloride flush  5-40 mL IntraVENous 2 times per day    insulin lispro  0-12 Units SubCUTAneous TID WC    insulin lispro  0-6 Units SubCUTAneous Nightly    cefepime  2,000 mg IntraVENous Q12H    vancomycin (VANCOCIN) intermittent dosing (placeholder)   Other RX Placeholder    vancomycin  1,000 mg IntraVENous Q12H           [facility]  Jarrett Jimenez MD  [employer] messaging  OFFICE: [phone]      Electronically signed by Jarrett Jimenez MD on 11/5/2021 at 1:05 PM  Thank you for allowing us to participate in the care of this patient. Please call with questions.     This note iscreated with the assistance of a speech recognition program.  While intending to generate a document that actually reflects the content of the visit, the document can still have some errors including those of syntax andsound a like substitutions which may escape proof reading. In such instances, actual meaning can be extrapolated by contextual diversion.
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
## 2021-11-06 LAB
ANION GAP SERPL CALCULATED.3IONS-SCNC: 11 MMOL/L (ref 9–17)
BUN BLDV-MCNC: 14 MG/DL (ref 8–23)
BUN/CREAT BLD: 16 (ref 9–20)
CALCIUM SERPL-MCNC: 8.3 MG/DL (ref 8.6–10.4)
CHLORIDE BLD-SCNC: 106 MMOL/L (ref 98–107)
CO2: 22 MMOL/L (ref 20–31)
CREAT SERPL-MCNC: 0.87 MG/DL (ref 0.7–1.2)
CULTURE: ABNORMAL
CULTURE: ABNORMAL
DIRECT EXAM: ABNORMAL
GFR AFRICAN AMERICAN: >60 ML/MIN
GFR NON-AFRICAN AMERICAN: >60 ML/MIN
GFR SERPL CREATININE-BSD FRML MDRD: ABNORMAL ML/MIN/{1.73_M2}
GFR SERPL CREATININE-BSD FRML MDRD: ABNORMAL ML/MIN/{1.73_M2}
GLUCOSE BLD-MCNC: 120 MG/DL (ref 75–110)
GLUCOSE BLD-MCNC: 132 MG/DL (ref 70–99)
GLUCOSE BLD-MCNC: 138 MG/DL (ref 75–110)
GLUCOSE BLD-MCNC: 219 MG/DL (ref 75–110)
GLUCOSE BLD-MCNC: 235 MG/DL (ref 75–110)
Lab: ABNORMAL
POTASSIUM SERPL-SCNC: 4 MMOL/L (ref 3.7–5.3)
SODIUM BLD-SCNC: 139 MMOL/L (ref 135–144)
SPECIMEN DESCRIPTION: ABNORMAL

## 2021-11-06 PROCEDURE — 6360000002 HC RX W HCPCS: Performed by: NURSE PRACTITIONER

## 2021-11-06 PROCEDURE — 2580000003 HC RX 258: Performed by: STUDENT IN AN ORGANIZED HEALTH CARE EDUCATION/TRAINING PROGRAM

## 2021-11-06 PROCEDURE — 6370000000 HC RX 637 (ALT 250 FOR IP): Performed by: NURSE PRACTITIONER

## 2021-11-06 PROCEDURE — 6360000002 HC RX W HCPCS: Performed by: INTERNAL MEDICINE

## 2021-11-06 PROCEDURE — 99232 SBSQ HOSP IP/OBS MODERATE 35: CPT | Performed by: INTERNAL MEDICINE

## 2021-11-06 PROCEDURE — 80048 BASIC METABOLIC PNL TOTAL CA: CPT

## 2021-11-06 PROCEDURE — 99232 SBSQ HOSP IP/OBS MODERATE 35: CPT | Performed by: SURGERY

## 2021-11-06 PROCEDURE — 1200000000 HC SEMI PRIVATE

## 2021-11-06 PROCEDURE — 82947 ASSAY GLUCOSE BLOOD QUANT: CPT

## 2021-11-06 PROCEDURE — 6360000002 HC RX W HCPCS: Performed by: STUDENT IN AN ORGANIZED HEALTH CARE EDUCATION/TRAINING PROGRAM

## 2021-11-06 PROCEDURE — 6370000000 HC RX 637 (ALT 250 FOR IP): Performed by: INTERNAL MEDICINE

## 2021-11-06 PROCEDURE — 97530 THERAPEUTIC ACTIVITIES: CPT

## 2021-11-06 PROCEDURE — 2580000003 HC RX 258: Performed by: NURSE PRACTITIONER

## 2021-11-06 PROCEDURE — 36415 COLL VENOUS BLD VENIPUNCTURE: CPT

## 2021-11-06 RX ORDER — LANOLIN ALCOHOL/MO/W.PET/CERES
3 CREAM (GRAM) TOPICAL NIGHTLY PRN
Status: DISCONTINUED | OUTPATIENT
Start: 2021-11-06 | End: 2021-11-13 | Stop reason: HOSPADM

## 2021-11-06 RX ADMIN — TAMSULOSIN HYDROCHLORIDE 0.4 MG: 0.4 CAPSULE ORAL at 21:06

## 2021-11-06 RX ADMIN — Medication 2 MG: at 21:06

## 2021-11-06 RX ADMIN — OXYCODONE AND ACETAMINOPHEN 1 TABLET: 5; 325 TABLET ORAL at 09:07

## 2021-11-06 RX ADMIN — ATORVASTATIN CALCIUM 10 MG: 10 TABLET, FILM COATED ORAL at 21:06

## 2021-11-06 RX ADMIN — INSULIN LISPRO 2 UNITS: 100 INJECTION, SOLUTION INTRAVENOUS; SUBCUTANEOUS at 21:06

## 2021-11-06 RX ADMIN — DOCUSATE SODIUM 100 MG: 100 CAPSULE, LIQUID FILLED ORAL at 21:05

## 2021-11-06 RX ADMIN — METOPROLOL TARTRATE 25 MG: 25 TABLET, FILM COATED ORAL at 09:07

## 2021-11-06 RX ADMIN — APIXABAN 5 MG: 5 TABLET, FILM COATED ORAL at 09:07

## 2021-11-06 RX ADMIN — DOCUSATE SODIUM 100 MG: 100 CAPSULE, LIQUID FILLED ORAL at 09:07

## 2021-11-06 RX ADMIN — AMITRIPTYLINE HYDROCHLORIDE 75 MG: 50 TABLET, FILM COATED ORAL at 21:05

## 2021-11-06 RX ADMIN — FAMOTIDINE 20 MG: 20 TABLET ORAL at 09:07

## 2021-11-06 RX ADMIN — PROBIOTIC PRODUCT - TAB 1 TABLET: TAB at 09:06

## 2021-11-06 RX ADMIN — OXYCODONE AND ACETAMINOPHEN 1 TABLET: 5; 325 TABLET ORAL at 22:57

## 2021-11-06 RX ADMIN — FAMOTIDINE 20 MG: 20 TABLET ORAL at 21:06

## 2021-11-06 RX ADMIN — Medication 1 TABLET: at 09:06

## 2021-11-06 RX ADMIN — ASPIRIN 81 MG: 81 TABLET, COATED ORAL at 09:06

## 2021-11-06 RX ADMIN — INSULIN GLARGINE 25 UNITS: 100 INJECTION, SOLUTION SUBCUTANEOUS at 21:06

## 2021-11-06 RX ADMIN — APIXABAN 5 MG: 5 TABLET, FILM COATED ORAL at 21:05

## 2021-11-06 RX ADMIN — FERROUS SULFATE TAB EC 325 MG (65 MG FE EQUIVALENT) 325 MG: 325 (65 FE) TABLET DELAYED RESPONSE at 09:07

## 2021-11-06 RX ADMIN — Medication 3 MG: at 00:52

## 2021-11-06 RX ADMIN — OXYCODONE AND ACETAMINOPHEN 1 TABLET: 5; 325 TABLET ORAL at 15:02

## 2021-11-06 RX ADMIN — OXYCODONE AND ACETAMINOPHEN 1 TABLET: 5; 325 TABLET ORAL at 00:04

## 2021-11-06 RX ADMIN — VANCOMYCIN HYDROCHLORIDE 750 MG: 750 INJECTION, POWDER, LYOPHILIZED, FOR SOLUTION INTRAVENOUS at 04:47

## 2021-11-06 RX ADMIN — PROBIOTIC PRODUCT - TAB 1 TABLET: TAB at 21:05

## 2021-11-06 RX ADMIN — Medication 2 MG: at 17:01

## 2021-11-06 RX ADMIN — METOPROLOL TARTRATE 25 MG: 25 TABLET, FILM COATED ORAL at 21:06

## 2021-11-06 RX ADMIN — CEFEPIME HYDROCHLORIDE 2000 MG: 2 INJECTION, POWDER, FOR SOLUTION INTRAVENOUS at 00:04

## 2021-11-06 RX ADMIN — FERROUS SULFATE TAB EC 325 MG (65 MG FE EQUIVALENT) 325 MG: 325 (65 FE) TABLET DELAYED RESPONSE at 21:05

## 2021-11-06 RX ADMIN — INSULIN LISPRO 4 UNITS: 100 INJECTION, SOLUTION INTRAVENOUS; SUBCUTANEOUS at 17:59

## 2021-11-06 RX ADMIN — CEFEPIME HYDROCHLORIDE 2000 MG: 2 INJECTION, POWDER, FOR SOLUTION INTRAVENOUS at 17:05

## 2021-11-06 ASSESSMENT — PAIN DESCRIPTION - FREQUENCY
FREQUENCY: CONTINUOUS

## 2021-11-06 ASSESSMENT — PAIN DESCRIPTION - PROGRESSION
CLINICAL_PROGRESSION: NOT CHANGED

## 2021-11-06 ASSESSMENT — PAIN DESCRIPTION - DESCRIPTORS
DESCRIPTORS: PATIENT UNABLE TO DESCRIBE
DESCRIPTORS: ACHING;DISCOMFORT

## 2021-11-06 ASSESSMENT — PAIN DESCRIPTION - ORIENTATION
ORIENTATION: LEFT

## 2021-11-06 ASSESSMENT — PAIN DESCRIPTION - PAIN TYPE
TYPE: ACUTE PAIN

## 2021-11-06 ASSESSMENT — PAIN DESCRIPTION - ONSET
ONSET: ON-GOING

## 2021-11-06 ASSESSMENT — PAIN - FUNCTIONAL ASSESSMENT
PAIN_FUNCTIONAL_ASSESSMENT: PREVENTS OR INTERFERES SOME ACTIVE ACTIVITIES AND ADLS

## 2021-11-06 ASSESSMENT — PAIN SCALES - GENERAL
PAINLEVEL_OUTOF10: 8
PAINLEVEL_OUTOF10: 7
PAINLEVEL_OUTOF10: 8

## 2021-11-06 ASSESSMENT — PAIN DESCRIPTION - LOCATION
LOCATION: LEG

## 2021-11-06 ASSESSMENT — ENCOUNTER SYMPTOMS
ALLERGIC/IMMUNOLOGIC NEGATIVE: 1
RESPIRATORY NEGATIVE: 1
GASTROINTESTINAL NEGATIVE: 1

## 2021-11-06 NOTE — FLOWSHEET NOTE
Patient states about his recent amputation of part of his leg  Amputation due to diabetes. States frustrations. States about his challenges, struggles. States lives alone.  shared in presence, listening , prayer. Follow up as needed. 11/06/21 1150   Encounter Summary   Services provided to: Patient   Referral/Consult From: [facility]   (11-6-21)   Complexity of Encounter Moderate   Length of Encounter 15 minutes   Spiritual Assessment Completed Yes   Routine   Type Initial   Assessment Passive; Anxious   Intervention Active listening; Explored feelings, thoughts, concerns; Prayer; Sustaining presence/ Ministry of presence; Discussed illness/injury and it's impact;  Discussed belief system/Protestant practices/zoie   Outcome Expressed gratitude; Receptive; Engaged in conversation; Expressed feelings/needs/concerns
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
## 2021-11-06 NOTE — PROGRESS NOTES
VASCULAR SURGERY  PROGRESS NOTE      11/6/2021 6:57 PM  Subjective:   Admit Date: 11/3/2021  PCP: MARCELO Harrison CNP    Chief Complaint   Patient presents with    Wound Infection     Interval History: No complaints. Patient was apparently walking on his stumps to the commode per nursing. Diet: ADULT DIET; Regular; 4 carb choices (60 gm/meal); Low Fat/Low Chol/High Fiber/BRIJESH    Medications:   Scheduled Meds:   amitriptyline  75 mg Oral Nightly    apixaban  5 mg Oral BID    aspirin EC  81 mg Oral Daily    atorvastatin  10 mg Oral Nightly    docusate sodium  100 mg Oral BID    famotidine  20 mg Oral BID    ferrous sulfate  325 mg Oral BID    insulin glargine  25 Units SubCUTAneous Nightly    lactobacillus  1 tablet Oral BID    metoprolol tartrate  25 mg Oral BID    therapeutic multivitamin-minerals  1 tablet Oral Daily    tamsulosin  0.4 mg Oral Nightly    sodium chloride flush  5-40 mL IntraVENous 2 times per day    insulin lispro  0-12 Units SubCUTAneous TID WC    insulin lispro  0-6 Units SubCUTAneous Nightly    cefepime  2,000 mg IntraVENous Q12H     Continuous Infusions:   dextrose      sodium chloride           Labs:   CBC:   Recent Labs     11/03/21  1903 11/04/21  0659 11/05/21  0617   WBC 17.4* 12.3* 10.8   HGB 12.3* 9.3* 9.1*    417 439     BMP:    Recent Labs     11/04/21  0659 11/05/21  0617 11/06/21  0607    137 139   K 3.5* 4.4 4.0    106 106   CO2 21 22 22   BUN 20 17 14   CREATININE 0.87 0.92 0.87   GLUCOSE 175* 265* 132*     Hepatic:   Recent Labs     11/03/21 2057   AST 6   ALT 5   BILITOT 0.13*   ALKPHOS 119     Troponin: Invalid input(s): TROPONIN  BNP: No results for input(s): BNP in the last 72 hours. Lipids: No results for input(s): CHOL, HDL in the last 72 hours.     Invalid input(s): LDLCALCU  INR:   Recent Labs     11/03/21 1942   INR 1.2       Objective:   Vitals: BP (!) 117/51   Pulse 74   Temp 98.1 °F (36.7 °C) (Oral)   Resp 16   Ht 6' 1\" (1.854 m)   Wt 151 lb (68.5 kg)   SpO2 95%   BMI 19.92 kg/m²   General appearance: alert, cooperative and no distress  Mental Status: oriented to person, place and time with normal affect  Neck: good carotid pulses, no JVD  Lungs: clear to auscultation bilaterally, normal effort  Heart: regular rate and rhythm, no murmur,  Abdomen: soft, non-tender, non-distended, bowel sounds present all four quadrants, no masses, hepatomegaly, splenomegaly or aortic enlargement  Extremities: dry eschar on anterior aspect of right BKA wound, left BKA site with dry eschar distally and open area laterally with some exposed bone adherent to the skin    Assessment:   3 61year-old noncompliant diabetic male with open wound on the left BKA site secondary to trauma with probable infected hematoma    Patient Active Problem List:     Type 2 diabetes mellitus with diabetic polyneuropathy, with long-term current use of insulin (HCC)     Neuropathic pain, leg     Diabetic polyneuropathy (HCC)     Allergic rhinitis     Tobacco dependence     Edentulous     Dysphagia     Lung nodules     Esophageal cancer (HCC)     Fracture of humerus, left, closed     Closed fracture of humerus     DM type 2 with diabetic peripheral neuropathy (HCC)     COPD without exacerbation (HCC)     HLD (hyperlipidemia)     Gastroesophageal reflux disease     Chronic pain syndrome     Marijuana use     History of colon polyps     Functional diarrhea     Sepsis due to methicillin resistant Staphylococcus aureus (MRSA) (HCC)     History of esophageal cancer     Osteomyelitis, chronic, ankle or foot (HCC)     PAD (peripheral artery disease) ([address]     Other pulmonary embolism without acute cor pulmonale (HCC)     Carotid stenosis, asymptomatic, bilateral     Lower limb amputation status     Fx humeral neck, right, closed, initial encounter     Transient hypotension     Constipation due to opioid therapy     Acute kidney injury ([address]     Complication of below knee amputation stump (HCC)     COPD exacerbation (HCC)     Hyponatremia     Pain, phantom limb (HCC)     S/P BKA (below knee amputation) bilateral (HCC)     Moderate protein malnutrition ([address]     Essential hypertension     Uncontrolled type 2 diabetes mellitus with hyperglycemia ([address]     History of pulmonary embolism - 2017     Atelectasis     Uncontrolled type 2 diabetes mellitus with foot ulcer (HCC)     Azotemia     Anemia, normocytic normochromic     Drug-induced constipation     Osteomyelitis of metatarsals of left foot (HCC)     Diabetic foot infection ([address]     Noncompliance     Type 1 diabetes mellitus with diabetic foot infection ([address]     Acute osteomyelitis of left foot (HCC)     Cellulitis of left foot     Mild malnutrition (HCC)     Hypocalcemia     Hypokalemia     Moderate malnutrition ([address]     History of below knee amputation, right ([address]     Foot ulcer with fat layer exposed, left ([address]     Chronic refractory osteomyelitis of left foot ([address]     Sepsis ([address]     Pyogenic inflammation of bone ([address]     Cellulitis of left lower extremity     History of below knee amputation, left (HCC)     Leg ulcer, left, with fat layer exposed ([address]     S/P amputation     Tobacco abuse     Pneumonia of right lower lobe due to infectious organism     Abdominal pain     Marijuana abuse     Parapneumonic effusion     Hiatus hernia -large     Metabolic encephalopathy     Altered mental status      Plan:   1. Continue dressing changes  2. Continue cefepime  3.  Plan wound VAC Monday or Tuesday    Electronically signed by Dayanna Palacios MD on 11/6/2021 at 6:57 PM

## 2021-11-06 NOTE — PROGRESS NOTES
VASCULAR SURGERY  PROGRESS NOTE      11/6/2021 12:55 AM  Subjective:   Admit Date: 11/3/2021  PCP: MARCELO Laureano CNP    Chief Complaint   Patient presents with    Wound Infection     Interval History: No complaints. ID noted. Diet: ADULT DIET; Regular; 4 carb choices (60 gm/meal); Low Fat/Low Chol/High Fiber/BRIJESH    Medications:   Scheduled Meds:   vancomycin  750 mg IntraVENous Q12H    amitriptyline  75 mg Oral Nightly    apixaban  5 mg Oral BID    aspirin EC  81 mg Oral Daily    atorvastatin  10 mg Oral Nightly    docusate sodium  100 mg Oral BID    famotidine  20 mg Oral BID    ferrous sulfate  325 mg Oral BID    insulin glargine  25 Units SubCUTAneous Nightly    lactobacillus  1 tablet Oral BID    metoprolol tartrate  25 mg Oral BID    therapeutic multivitamin-minerals  1 tablet Oral Daily    tamsulosin  0.4 mg Oral Nightly    sodium chloride flush  5-40 mL IntraVENous 2 times per day    insulin lispro  0-12 Units SubCUTAneous TID WC    insulin lispro  0-6 Units SubCUTAneous Nightly    cefepime  2,000 mg IntraVENous Q12H    vancomycin (VANCOCIN) intermittent dosing (placeholder)   Other RX Placeholder     Continuous Infusions:   dextrose      sodium chloride           Labs:   CBC:   Recent Labs     11/03/21  1903 11/04/21  0659 11/05/21  0617   WBC 17.4* 12.3* 10.8   HGB 12.3* 9.3* 9.1*    417 439     BMP:    Recent Labs     11/03/21 2057 11/04/21  0659 11/05/21  0617   * 135 137   K 4.1 3.5* 4.4   CL 97* 103 106   CO2 23 21 22   BUN 23 20 17   CREATININE 0.95 0.87 0.92   GLUCOSE 226* 175* 265*     Hepatic:   Recent Labs     11/03/21 2057   AST 6   ALT 5   BILITOT 0.13*   ALKPHOS 119     Troponin: Invalid input(s): TROPONIN  BNP: No results for input(s): BNP in the last 72 hours. Lipids: No results for input(s): CHOL, HDL in the last 72 hours.     Invalid input(s): LDLCALCU  INR:   Recent Labs     11/03/21 1942   INR 1.2       Objective:   Vitals: BP (!) 136/59   Pulse 90   Temp 98.5 °F (36.9 °C) (Oral)   Resp 16   Ht 6' 1\" (1.854 m)   Wt 157 lb 4 oz (71.3 kg)   SpO2 94%   BMI 20.75 kg/m²   General appearance: alert, cooperative and no distress  Mental Status: oriented to person, place and time with normal affect  Neck: good carotid pulses, no JVD  Lungs: clear to auscultation bilaterally, normal effort  Heart: regular rate and rhythm, no murmur,  Abdomen: soft, non-tender, non-distended, bowel sounds present all four quadrants, no masses, hepatomegaly, splenomegaly or aortic enlargement  Extremities: dry eschar on anterior aspect of right BKA wound, left BKA site with dry eschar distally and open area laterally with some exposed bone adherent to the skin    Assessment:   3 61year-old noncompliant diabetic male with open wound on the left BKA site secondary to trauma with probable infected hematoma    Patient Active Problem List:     Type 2 diabetes mellitus with diabetic polyneuropathy, with long-term current use of insulin (HCC)     Neuropathic pain, leg     Diabetic polyneuropathy (HCC)     Allergic rhinitis     Tobacco dependence     Edentulous     Dysphagia     Lung nodules     Esophageal cancer (HCC)     Fracture of humerus, left, closed     Closed fracture of humerus     DM type 2 with diabetic peripheral neuropathy (HCC)     COPD without exacerbation (HCC)     HLD (hyperlipidemia)     Gastroesophageal reflux disease     Chronic pain syndrome     Marijuana use     History of colon polyps     Functional diarrhea     Sepsis due to methicillin resistant Staphylococcus aureus (MRSA) (HCC)     History of esophageal cancer     Osteomyelitis, chronic, ankle or foot (HCC)     PAD (peripheral artery disease) ([facility] [address]     Other pulmonary embolism without acute cor pulmonale (HCC)     Carotid stenosis, asymptomatic, bilateral     Lower limb amputation status     Fx humeral neck, right, closed, initial encounter     Transient hypotension     Constipation due to opioid therapy     Acute kidney injury ([address]     Complication of below knee amputation stump (HCC)     COPD exacerbation (HCC)     Hyponatremia     Pain, phantom limb (HCC)     S/P BKA (below knee amputation) bilateral (HCC)     Moderate protein malnutrition ([address]     Essential hypertension     Uncontrolled type 2 diabetes mellitus with hyperglycemia ([address]     History of pulmonary embolism - 2017     Atelectasis     Uncontrolled type 2 diabetes mellitus with foot ulcer (HCC)     Azotemia     Anemia, normocytic normochromic     Drug-induced constipation     Osteomyelitis of metatarsals of left foot (HCC)     Diabetic foot infection ([address]     Noncompliance     Type 1 diabetes mellitus with diabetic foot infection ([address]     Acute osteomyelitis of left foot (HCC)     Cellulitis of left foot     Mild malnutrition (HCC)     Hypocalcemia     Hypokalemia     Moderate malnutrition (HCC)     History of below knee amputation, right ([address]     Foot ulcer with fat layer exposed, left ([address]     Chronic refractory osteomyelitis of left foot ([address]     Sepsis ([address]     Pyogenic inflammation of bone ([address]     Cellulitis of left lower extremity     History of below knee amputation, left (HCC)     Leg ulcer, left, with fat layer exposed ([address]     S/P amputation     Tobacco abuse     Pneumonia of right lower lobe due to infectious organism     Abdominal pain     Marijuana abuse     Parapneumonic effusion     Hiatus hernia -large     Metabolic encephalopathy     Altered mental status      Plan:   1. Continue dressing changes  2. Continue antibiotics per ID  3.  Likely convert to wound VAC in the near future    Electronically signed by Luis F Mares MD on 11/6/2021 at 12:55 AM

## 2021-11-06 NOTE — PROGRESS NOTES
[facility]  Progress Note    Neda Josue  MRN: [id number]  Date: 11/6/2021  LOS: 3     Reason for F/U :   Left BKA stump infected hematoma and concern for osteomyelitis    Impression :   1. Left BKA stump traumatic wound and infected hematoma/cellulitis  2. Diabetes mellitus with associated neuropathy  3. COPD  4. Peripheral arterial disease  5. History of esophageal cancer    Recommendations:   · Continue intravenous antimicrobial therapy with cefepime and I will discontinue the vancomycin as the patient has MSSA and Klebsiella Aerogenes  · The patient continues to have a quite a large soft tissue defect at the left BKA stump and the tibial bone is exposed. · The patient will probably benefit from a wound VAC. · The plan is for IV antimicrobial therapy for at least 4 weeks. Infection Control Recommendations:   Contact precautions    Discharge Planning:   Estimated Length of IV antimicrobials: 4 weeks  Patient will need PICC line Insertion  [facility]  Patient willneed outpatient wound care: Yes    Medical Decision making / Summary of Stay:   Neda Josue is a 61y.o.-year-old male who was initially admitted on 11/3/2021. Mauro Gaviria has a history of esophageal cancer, COPD, peripheral arterial disease, diabetes mellitus with associated neuropathy, hyperlipidemia, hypertension, multiple bouts of osteomyelitis in the lower extremities bilaterally and has since had bilateral below the knee amputations. The patient has bilateral stump wounds with some scabbed lesions and reports that he recently had trauma to the left BKA stump when he fell out of his wheelchair. The patient does have a history of infection of his BKA stump and in August 2021 was treated for osteomyelitis of the BKA stump with a combination of cefepime and vancomycin for 6 weeks.   The patient came into the emergency room for evaluation for worsening wound of the left stump without any associated fevers chills nausea or vomiting. The patient has an open wound of the left BKA site and with what seems to be a draining hematoma. He was seen by the vascular surgery team today and there is some concern for cellulitis he was started on antimicrobial therapy. He currently is doing local wound care and I was asked to evaluate and help with antibiotic choice. Current evaluation:2021    BP (!) 117/51   Pulse 74   Temp 98.1 °F (36.7 °C) (Oral)   Resp 16   Ht 6' 1\" (1.854 m)   Wt 151 lb (68.5 kg)   SpO2 95%   BMI 19.92 kg/m²     Temperature Range: Temp: 98.1 °F (36.7 °C) Temp  Av.2 °F (36.8 °C)  Min: 97.9 °F (36.6 °C)  Max: 98.5 °F (36.9 °C)  The patient is seen and evaluated at bedside he is awake and alert in no acute distress. Is reporting pain at the BKA stump. No subjective fevers or chills. The patient is seen and examined with the nurse in the room with me. Review of Systems   Constitutional: Negative. Respiratory: Negative. Cardiovascular: Negative. Gastrointestinal: Negative. Genitourinary: Negative. Musculoskeletal: Negative. Skin: Positive for wound. Allergic/Immunologic: Negative. Neurological: Negative. Physical Examination :     Physical Exam  Constitutional:       Appearance: He is well-developed. HENT:      Head: Normocephalic and atraumatic. Cardiovascular:      Rate and Rhythm: Normal rate. Heart sounds: Normal heart sounds. No friction rub. No gallop. Pulmonary:      Effort: Pulmonary effort is normal.      Breath sounds: Normal breath sounds. No wheezing. Abdominal:      General: Bowel sounds are normal.      Palpations: Abdomen is soft. There is no mass. Tenderness: There is no abdominal tenderness. Musculoskeletal:         General: Normal range of motion. Cervical back: Normal range of motion and neck supple. Comments: Bilateral knee amputations.      Lymphadenopathy:      Cervical: No cervical adenopathy. Skin:     General: Skin is warm and dry. Comments: The cellulitic changes at the left BKA stump have resolved. The patient has a lateral left BKA stump wound that probes deep and still has some bloody drainage   Neurological:      Mental Status: He is alert and oriented to person, place, and time. Laboratory data:   I have independently reviewed the followinglabs:  CBC with Differential:   Recent Labs     11/03/21  1903 11/03/21  1903 11/04/21  0659 11/05/21 0617   WBC 17.4*   < > 12.3* 10.8   HGB 12.3*   < > 9.3* 9.1*   HCT 37.4*   < > 30.9* 31.3*      < > 417 439   LYMPHOPCT 9*  --   --   --    MONOPCT 8  --   --   --     < > = values in this interval not displayed. BMP:   Recent Labs     11/04/21  0659 11/04/21  0659 11/05/21  0617 11/06/21  0607      < > 137 139   K 3.5*   < > 4.4 4.0      < > 106 106   CO2 21   < > 22 22   BUN 20   < > 17 14   CREATININE 0.87   < > 0.92 0.87   MG 2.0  --   --   --     < > = values in this interval not displayed.      Hepatic Function Panel:   Recent Labs     11/03/21 2057   PROT 6.8   LABALBU 2.8*   BILITOT 0.13*   ALKPHOS 119   ALT 5   AST 6         Lab Results   Component Value Date    PROCAL 0.13 05/25/2021    PROCAL 0.11 05/25/2021     Lab Results   Component Value Date    .7 11/04/2021    CRP 91.6 04/05/2021    .0 02/03/2021     Lab Results   Component Value Date    SEDRATE 97 (H) 11/04/2021         Lab Results   Component Value Date    DDIMER 0.39 06/29/2020    DDIMER 1.63 12/20/2017    DDIMER 0.68 12/25/2011     Lab Results   Component Value Date    FERRITIN 56 02/09/2021    FERRITIN 64 01/25/2014     No results found for: LDH  No results found for: FIBRINOGEN    Results in Past 30 Days  Result Component Current Result Ref Range Previous Result Ref Range   SARS-CoV-2, Rapid Not Detected (11/3/2021) Not Detected Not in Time Range      Lab Results   Component Value Date    COVID19 Not Detected 11/03/2021    COVID19 Not Detected 09/02/2021    COVID19 Not Detected 08/29/2021    COVID19 Not Detected 08/23/2020       Recent Labs     11/05/21  1305   VANCOTROUGH 18.5       Imaging Studies:   No new imaging    Cultures:     Culture, Anaerobic and Aerobic [[id number]] (Abnormal) Collected: 11/04/21 2145   Order Status: Completed Specimen: Abscess Updated: 11/06/21 1105    Specimen Description . ABSCESS    Special Requests NOT REPORTED    Direct Exam MANY NEUTROPHILS Abnormal      RARE GRAM POSITIVE COCCI IN CLUSTERS Abnormal     Culture STAPHYLOCOCCUS AUREUS MODERATE GROWTH Abnormal    Wound Culture [[id number]] (Abnormal)  Collected: 11/04/21 0020   Order Status: Completed Specimen: No Site Given from Skin Updated: 11/06/21 0700    Specimen Description . SKIN LT LEG STUMP    Special Requests NOT REPORTED    Direct Exam MODERATE NEUTROPHILS Abnormal      FEW GRAM NEGATIVE RODS Abnormal      FEW GRAM POSITIVE COCCI IN CLUSTERS Abnormal     Culture STAPHYLOCOCCUS AUREUS MODERATE GROWTH This isolate is methicillin susceptible. Abnormal      KLEBSIELLA AEROGENES MODERATE GROWTH Abnormal    Susceptibility     Staphylococcus aureus Klebsiella aerogenes     BACTERIAL SUSCEPTIBILITY PANEL DARIAN BACTERIAL SUSCEPTIBILITY PANEL DARIAN     amikacin   NOT REPORTED       aztreonam   <=1  Sensitive     ceFAZolin   >=64  Resistant     cefepime   NOT REPORTED       cefoxitin screen NOT REPORTED         cefTRIAXone   <=1  Sensitive     ciprofloxacin NOT REPORTED   <=0.25  Sensitive     clindamycin <=0.25  Sensitive       ertapenem   NOT REPORTED       erythromycin <=0.25  Sensitive       gentamicin <=0.5   Gentamicin . ..  Sensitive  Sensitive <=1  Sensitive     Induced Clind Resist NOT REPORTED         levofloxacin 0.25  Sensitive       linezolid NOT REPORTED         meropenem   NOT REPORTED       moxifloxacin NOT REPORTED         nitrofurantoin NOT REPORTED   NOT REPORTED       oxacillin 0.5  Sensitive       penicillin >=0.5  Resistant piperacillin-tazobactam   <=4  Sensitive     rifampin NOT REPORTED         Synercid NOT REPORTED         tetracycline <=1  Sensitive       tigecycline NOT REPORTED   NOT REPORTED       tobramycin   <=1  Sensitive     trimethoprim-sulfamethoxazole <=10  Sensitive <=20  Sensitive     vancomycin NOT REPORTED          Culture, Blood 1 [[id number]] Collected: 11/04/21 0005   Order Status: Completed Specimen: Blood Updated: 11/06/21 0014    Specimen Description . BLOOD    Special Requests RT AC 10ML    Culture NO GROWTH 2 DAYS   Culture, Blood 1 [[id number]] Collected: 11/04/21 0005   Order Status: Completed Specimen: Blood Updated: 11/06/21 0014    Specimen Description . BLOOD    Special Requests RT HAND 7ML    Culture NO GROWTH 2 DAYS   Culture, Urine [[id number]] Collected: 11/03/21 2104   Order Status: Completed Specimen: Urine, clean catch Updated: 11/04/21 2328    Specimen Description . CLEAN CATCH URINE    Special Requests NOT REPORTED    Culture NO SIGNIFICANT GROWTH   Wound Gram stain [[id number]] Collected: 11/04/21 0015   Order Status: Canceled Specimen: No Site Given from Wound    COVID-19, Rapid [[id number]] Collected: 11/03/21 2050   Order Status: Completed Specimen: Nasopharyngeal Swab Updated: 11/03/21 2127    Specimen Description . NASOPHARYNGEAL SWAB    SARS-CoV-2, Rapid Not Detected    Comment:        Rapid NAAT:  The specimen is NEGATIVE for SARS-CoV-2, the novel coronavirus associated with   COVID-19.         The ID NOW COVID-19 assay is designed to detect the virus that causes COVID-19 in patients   with signs and symptoms of infection who are suspected of COVID-19. An individual without symptoms of COVID-19 and who is not shedding SARS-CoV-2 virus would   expect to have a negative (not detected) result in this assay.    Negative results should be treated as presumptive and, if inconsistent with clinical signs   and symptoms or necessary for patient management,   should be tested with an alternative molecular assay. Negative results do not preclude   SARS-CoV-2 infection and   should not be used as the sole basis for patient management decisions.         Fact sheet for Healthcare Providers: Lazaro   Fact sheet for Patients: Lazaro           Methodology: Isothermal Nucleic Acid Amplification         Medications:      vancomycin  750 mg IntraVENous Q12H    amitriptyline  75 mg Oral Nightly    apixaban  5 mg Oral BID    aspirin EC  81 mg Oral Daily    atorvastatin  10 mg Oral Nightly    docusate sodium  100 mg Oral BID    famotidine  20 mg Oral BID    ferrous sulfate  325 mg Oral BID    insulin glargine  25 Units SubCUTAneous Nightly    lactobacillus  1 tablet Oral BID    metoprolol tartrate  25 mg Oral BID    therapeutic multivitamin-minerals  1 tablet Oral Daily    tamsulosin  0.4 mg Oral Nightly    sodium chloride flush  5-40 mL IntraVENous 2 times per day    insulin lispro  0-12 Units SubCUTAneous TID WC    insulin lispro  0-6 Units SubCUTAneous Nightly    cefepime  2,000 mg IntraVENous Q12H    vancomycin (VANCOCIN) intermittent dosing (placeholder)   Other RX Placeholder           [facility]  Tory Smith MD  Perfect [employer] messaging  OFFICE: [phone]      Electronically signed by Tory Smith MD on 11/6/2021 at 11:51 AM  Thank you for allowing us to participate in the care of this patient. Please call with questions. This note iscreated with the assistance of a speech recognition program.  While intending to generate a document that actually reflects the content of the visit, the document can still have some errors including those of syntax andsound a like substitutions which may escape proof reading. In such instances, actual meaning can be extrapolated by contextual diversion.
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
## 2021-11-06 NOTE — PLAN OF CARE
Problem: Falls - Risk of:  Goal: Will remain free from falls  Description: Will remain free from falls  11/6/2021 1041 by Melita Irving RN  Outcome: Ongoing  Note: Room free of clutter  Hourly rounding   Non-skid socks worn  Side rails up x2  Bed low and locked  Call light in reach  Instructed to call out before getting out of bed  Anticipatory needs met  Bed alarm on  Falling star at the door        Problem: Pain:  Goal: Pain level will decrease  Description: Pain level will decrease  11/6/2021 1041 by Melita Irving RN  Outcome: Ongoing  Note: Pain level assessed and rated on a 0-10 scale  Assess characteristics of pain  PRN pain medication given per pt request  Non-pharmacological interventions implemented  Report ineffective pain management to physician  Update pt and family of any changes  Pt instructed to call out with new onset of pain  Continue to monitor       Problem: Skin Integrity:  Goal: Demonstration of wound healing without infection will improve  Description: Demonstration of wound healing without infection will improve  Outcome: Ongoing  Note: Skin assessment completed and documented  Real scale updated  Relieve pressure to bony prominences  Avoid shearing  Turns side to side  Turns self  Heels elevated  Structural intactness and normal physiological function of skin and mucous membranes

## 2021-11-06 NOTE — PROGRESS NOTES
Vancomycin Dosing by Pharmacy - Daily Note   Vancomycin Therapy Day:  3  Indication: r/o osteomyelitis    Allergies:  Gabapentin and Other   Actual Weight:    Wt Readings from Last 1 Encounters:   11/06/21 151 lb (68.5 kg)       Labs/Ancillary Data  Estimated Creatinine Clearance: 83 mL/min (based on SCr of 0.87 mg/dL). Recent Labs     11/03/21  1903 11/03/21  2057 11/04/21  0659 11/05/21  0617 11/06/21  0607   CREATININE  --    < > 0.87 0.92 0.87   BUN  --    < > 20 17 14   WBC 17.4*  --  12.3* 10.8  --     < > = values in this interval not displayed. Procalcitonin   Date Value Ref Range Status   05/25/2021 0.13 (H) <0.09 ng/mL Final     Comment:           Suspected Sepsis:  <0.50 ng/mL     Low likelihood of sepsis. 0.50-2.00 ng/mL     Increased likelihood of sepsis. Antibiotics encouraged. >2.00 ng/mL     High risk of sepsis/shock. Antibiotics strongly encouraged. Suspected Lower Resp Tract Infections:  <0.24 ng/mL     Low likelihood of bacterial infection. >0.24 ng/mL     Increased likelihood of bacterial infection. Antibiotics encouraged. With successful antibiotic therapy, PCT levels should decrease rapidly. (Half-life of 24 to   36 hours.)        Procalcitonin values from samples collected within the first 6 hours of systemic infection   may still be low. Retesting may be indicated. Values from day 1 and day 4 can be entered into the Change in Procalcitonin Calculator   (www.[employer]-pct-calculator. [place].[employer]) to determine the patient's Mortality Risk Prognosis        In healthy neonates, plasma Procalcitonin (PCT) concentrations increase gradually after   birth, reaching peak values at about 24 hours of age then decrease to normal values below   0.5 ng/mL by 48-72 hours of age.          Intake/Output Summary (Last 24 hours) at 11/6/2021 0753  Last data filed at 11/6/2021 0452  Gross per 24 hour   Intake 480 ml   Output 1375 ml   Net -895 ml     Temp: 98.1    Culture Date / Source  /  Results  No growth x 2 days blood. Wound growing klebsiella and mssa  Recent vancomycin administrations                     vancomycin (VANCOCIN) 750 mg in dextrose 5 % 250 mL IVPB (mg) 750 mg New Bag 11/06/21 0447     750 mg New Bag 11/05/21 1752    vancomycin 1000 mg IVPB in 250 mL D5W addavial (mg) 1,000 mg New Bag 11/05/21 0446     1,000 mg New Bag 11/04/21 1721    vancomycin (VANCOCIN) 2,000 mg in dextrose 5 % 500 mL IVPB (mg) 2,000 mg New Bag 11/04/21 0128                  Vanco Random: No results for input(s): VANCORANDOM in the last 72 hours. Vanco Trough:   Recent Labs     11/05/21  1305   VANCOTROUGH 18.5     MRSA Nasal Swab: N/A. Non-respiratory infection. Albina Juarez PLAN   Continue vancomycin 750mg ivpb q12h . Vanco level on 11/7 1300. Vancomycin Target Concentration Parameters  Treatment  Population Target AUC/DARIAN Target Trough   Invasive MRSA Infection (bacteremia, pneumonia, meningitis, endocarditis, osteomyelitis)  Sepsis (undifferentiated) 400-600 N/A   Infection due to non-MRSA pathogen  Empiric treatment of non-invasive MRSA infection  (SSTI, UTI) <500 10-15 mg/L   CrCl < 29 mL/min  Rapidly fluctuating serum creatinine   LUMA N/A < 15 mg/L     Renal replacement therapy is dosed by levels, per hospital protocol. Abbreviations  * Pauc: probability that AUC is >400 (efficacy); Pconc: probability that Ctrough is above 20 ?g/mL (toxicity); Tox: Probability of nephrotoxicity, based on Marquis et al. Clin Infect Dis 2009. Thank you for the consult. Pharmacy will continue to follow.

## 2021-11-07 LAB
ANION GAP SERPL CALCULATED.3IONS-SCNC: 10 MMOL/L (ref 9–17)
BUN BLDV-MCNC: 14 MG/DL (ref 8–23)
BUN/CREAT BLD: 18 (ref 9–20)
CALCIUM SERPL-MCNC: 8.5 MG/DL (ref 8.6–10.4)
CHLORIDE BLD-SCNC: 106 MMOL/L (ref 98–107)
CO2: 23 MMOL/L (ref 20–31)
CREAT SERPL-MCNC: 0.77 MG/DL (ref 0.7–1.2)
CULTURE: ABNORMAL
CULTURE: ABNORMAL
DIRECT EXAM: ABNORMAL
DIRECT EXAM: ABNORMAL
GFR AFRICAN AMERICAN: >60 ML/MIN
GFR NON-AFRICAN AMERICAN: >60 ML/MIN
GFR SERPL CREATININE-BSD FRML MDRD: ABNORMAL ML/MIN/{1.73_M2}
GFR SERPL CREATININE-BSD FRML MDRD: ABNORMAL ML/MIN/{1.73_M2}
GLUCOSE BLD-MCNC: 110 MG/DL (ref 70–99)
GLUCOSE BLD-MCNC: 112 MG/DL (ref 75–110)
GLUCOSE BLD-MCNC: 141 MG/DL (ref 75–110)
GLUCOSE BLD-MCNC: 189 MG/DL (ref 75–110)
GLUCOSE BLD-MCNC: 264 MG/DL (ref 75–110)
Lab: ABNORMAL
POTASSIUM SERPL-SCNC: 4.2 MMOL/L (ref 3.7–5.3)
SODIUM BLD-SCNC: 139 MMOL/L (ref 135–144)
SPECIMEN DESCRIPTION: ABNORMAL

## 2021-11-07 PROCEDURE — 1200000000 HC SEMI PRIVATE

## 2021-11-07 PROCEDURE — 6370000000 HC RX 637 (ALT 250 FOR IP): Performed by: NURSE PRACTITIONER

## 2021-11-07 PROCEDURE — 6360000002 HC RX W HCPCS: Performed by: INTERNAL MEDICINE

## 2021-11-07 PROCEDURE — 80048 BASIC METABOLIC PNL TOTAL CA: CPT

## 2021-11-07 PROCEDURE — 6370000000 HC RX 637 (ALT 250 FOR IP): Performed by: INTERNAL MEDICINE

## 2021-11-07 PROCEDURE — 6360000002 HC RX W HCPCS: Performed by: NURSE PRACTITIONER

## 2021-11-07 PROCEDURE — 36415 COLL VENOUS BLD VENIPUNCTURE: CPT

## 2021-11-07 PROCEDURE — 82947 ASSAY GLUCOSE BLOOD QUANT: CPT

## 2021-11-07 PROCEDURE — 2580000003 HC RX 258: Performed by: NURSE PRACTITIONER

## 2021-11-07 PROCEDURE — 99232 SBSQ HOSP IP/OBS MODERATE 35: CPT | Performed by: INTERNAL MEDICINE

## 2021-11-07 PROCEDURE — 99232 SBSQ HOSP IP/OBS MODERATE 35: CPT | Performed by: SURGERY

## 2021-11-07 RX ORDER — CALCIUM CARBONATE 200(500)MG
1000 TABLET,CHEWABLE ORAL 3 TIMES DAILY PRN
Status: DISCONTINUED | OUTPATIENT
Start: 2021-11-07 | End: 2021-11-13 | Stop reason: HOSPADM

## 2021-11-07 RX ADMIN — INSULIN LISPRO 1 UNITS: 100 INJECTION, SOLUTION INTRAVENOUS; SUBCUTANEOUS at 20:58

## 2021-11-07 RX ADMIN — TAMSULOSIN HYDROCHLORIDE 0.4 MG: 0.4 CAPSULE ORAL at 20:57

## 2021-11-07 RX ADMIN — Medication 2 MG: at 01:29

## 2021-11-07 RX ADMIN — FAMOTIDINE 20 MG: 20 TABLET ORAL at 08:44

## 2021-11-07 RX ADMIN — Medication 3 MG: at 19:26

## 2021-11-07 RX ADMIN — Medication 1000 MG: at 22:30

## 2021-11-07 RX ADMIN — OXYCODONE AND ACETAMINOPHEN 1 TABLET: 5; 325 TABLET ORAL at 17:48

## 2021-11-07 RX ADMIN — APIXABAN 5 MG: 5 TABLET, FILM COATED ORAL at 20:58

## 2021-11-07 RX ADMIN — INSULIN LISPRO 2 UNITS: 100 INJECTION, SOLUTION INTRAVENOUS; SUBCUTANEOUS at 11:07

## 2021-11-07 RX ADMIN — SODIUM CHLORIDE, PRESERVATIVE FREE 10 ML: 5 INJECTION INTRAVENOUS at 08:45

## 2021-11-07 RX ADMIN — CEFEPIME HYDROCHLORIDE 2000 MG: 2 INJECTION, POWDER, FOR SOLUTION INTRAVENOUS at 15:11

## 2021-11-07 RX ADMIN — AMITRIPTYLINE HYDROCHLORIDE 75 MG: 50 TABLET, FILM COATED ORAL at 21:25

## 2021-11-07 RX ADMIN — DOCUSATE SODIUM 100 MG: 100 CAPSULE, LIQUID FILLED ORAL at 20:57

## 2021-11-07 RX ADMIN — INSULIN LISPRO 6 UNITS: 100 INJECTION, SOLUTION INTRAVENOUS; SUBCUTANEOUS at 17:48

## 2021-11-07 RX ADMIN — CEFEPIME HYDROCHLORIDE 2000 MG: 2 INJECTION, POWDER, FOR SOLUTION INTRAVENOUS at 03:10

## 2021-11-07 RX ADMIN — Medication 2 MG: at 05:43

## 2021-11-07 RX ADMIN — OXYCODONE AND ACETAMINOPHEN 1 TABLET: 5; 325 TABLET ORAL at 22:14

## 2021-11-07 RX ADMIN — Medication 2 MG: at 11:07

## 2021-11-07 RX ADMIN — OXYCODONE AND ACETAMINOPHEN 1 TABLET: 5; 325 TABLET ORAL at 08:43

## 2021-11-07 RX ADMIN — INSULIN GLARGINE 25 UNITS: 100 INJECTION, SOLUTION SUBCUTANEOUS at 20:59

## 2021-11-07 RX ADMIN — APIXABAN 5 MG: 5 TABLET, FILM COATED ORAL at 08:44

## 2021-11-07 RX ADMIN — PROBIOTIC PRODUCT - TAB 1 TABLET: TAB at 08:43

## 2021-11-07 RX ADMIN — DOCUSATE SODIUM 100 MG: 100 CAPSULE, LIQUID FILLED ORAL at 08:43

## 2021-11-07 RX ADMIN — PROBIOTIC PRODUCT - TAB 1 TABLET: TAB at 20:57

## 2021-11-07 RX ADMIN — FAMOTIDINE 20 MG: 20 TABLET ORAL at 20:56

## 2021-11-07 RX ADMIN — FERROUS SULFATE TAB EC 325 MG (65 MG FE EQUIVALENT) 325 MG: 325 (65 FE) TABLET DELAYED RESPONSE at 20:58

## 2021-11-07 RX ADMIN — Medication 1 TABLET: at 08:43

## 2021-11-07 RX ADMIN — FERROUS SULFATE TAB EC 325 MG (65 MG FE EQUIVALENT) 325 MG: 325 (65 FE) TABLET DELAYED RESPONSE at 08:44

## 2021-11-07 RX ADMIN — ATORVASTATIN CALCIUM 10 MG: 10 TABLET, FILM COATED ORAL at 20:57

## 2021-11-07 RX ADMIN — ASPIRIN 81 MG: 81 TABLET, COATED ORAL at 08:43

## 2021-11-07 RX ADMIN — METOPROLOL TARTRATE 25 MG: 25 TABLET, FILM COATED ORAL at 08:43

## 2021-11-07 RX ADMIN — Medication 2 MG: at 19:26

## 2021-11-07 ASSESSMENT — PAIN DESCRIPTION - FREQUENCY
FREQUENCY: CONTINUOUS

## 2021-11-07 ASSESSMENT — PAIN SCALES - GENERAL
PAINLEVEL_OUTOF10: 9
PAINLEVEL_OUTOF10: 9
PAINLEVEL_OUTOF10: 8
PAINLEVEL_OUTOF10: 9
PAINLEVEL_OUTOF10: 8
PAINLEVEL_OUTOF10: 7

## 2021-11-07 ASSESSMENT — PAIN DESCRIPTION - LOCATION
LOCATION: LEG

## 2021-11-07 ASSESSMENT — PAIN DESCRIPTION - PAIN TYPE
TYPE: ACUTE PAIN

## 2021-11-07 ASSESSMENT — PAIN DESCRIPTION - DESCRIPTORS
DESCRIPTORS: ACHING;DISCOMFORT
DESCRIPTORS: ACHING;DISCOMFORT
DESCRIPTORS: ACHING;DISCOMFORT;SORE
DESCRIPTORS: CONSTANT;DISCOMFORT;SORE
DESCRIPTORS: ACHING;DISCOMFORT

## 2021-11-07 ASSESSMENT — PAIN DESCRIPTION - ONSET
ONSET: ON-GOING

## 2021-11-07 ASSESSMENT — PAIN DESCRIPTION - ORIENTATION
ORIENTATION: RIGHT;LEFT
ORIENTATION: LEFT
ORIENTATION: RIGHT;LEFT
ORIENTATION: LEFT
ORIENTATION: LEFT;RIGHT

## 2021-11-07 ASSESSMENT — PAIN DESCRIPTION - PROGRESSION
CLINICAL_PROGRESSION: NOT CHANGED

## 2021-11-07 NOTE — DISCHARGE INSTR - COC
----- Message from Shannen Vance sent at 8/27/2020  1:05 PM CDT -----  Name of Who is Calling: PRAVIN HERNANDEZ [[id number]]      What is the request in detail: Pt is calling to speak to staff in regards to a script for pain in her right shoulder .... Please call to further assist .       Can the clinic reply by [facility]: N      What Number to Call Back if not in [facility]: [phone]     Continuity of Care Form    Patient Name: Amy Ocasio   :  1955  MRN:  [id number]    Admit date:  11/3/2021  Discharge date:  2021    Code Status Order: Full Code   Advance Directives:      Admitting Physician:  Sisto Lesch Blood, DO  PCP: MARCELO Carmona CNP    Discharging Nurse: [place] Unit/Room#:   Discharging Unit Phone Number: [phone]    Emergency Contact:   Extended Emergency Contact Information  Primary Emergency Contact: Shaila Paulino 81, Kris Ruiz [address] Phone: [phone]  Relation: Child   needed?  No  Secondary Emergency Contact: Fartun Baeza  Address: [address]  Home Phone: [phone]  Relation: Parent    Past Surgical History:  Past Surgical History:   Procedure Laterality Date    COLONOSCOPY  2015    hyperplastic polyp    COLONOSCOPY  2017    ESOPHAGECTOMY      cancer    FOOT DEBRIDEMENT Left 2021    I&D LEFT FOOT WITH REMOVAL OF NONVIABLE BONE AND SOFT TISSUE performed by Paola Weiner DPM at [address] [place] [address] Left 2021    LEFT FOOT DEBRIDEMENT WITH REMOVAL ALL NON VIABLE SOFT TISSUE AND BONE performed by Paola Weiner DPM at [place] [address] Right 2016    I & D heel    FOOT SURGERY Right 2016    I & D    FRACTURE SURGERY Left 2015    humerus left, left leg    HC CATH POWER PICC SINGLE  2021         IR INS PICC VAD W SQ PORT GREATER THAN 5  2020    IR INS PICC VAD W SQ PORT GREATER THAN 5 2020 [facility]    IR INS PICC VAD W SQ PORT GREATER THAN 5  2021    IR INS PICC VAD W SQ PORT GREATER THAN 5 2021 [facility]    IR INS PICC VAD W SQ PORT GREATER THAN 5  2021    IR INS PICC VAD W SQ PORT GREATER THAN 5 2021 [facility]    LEG AMPUTATION BELOW KNEE Right 2017    LEG AMPUTATION BELOW KNEE Left 2021    LEFT  LEG AMPUTATION resistant Staphylococcus aureus (MRSA) ([facility]) A41.02    History of esophageal cancer Z85.01    Osteomyelitis, chronic, ankle or foot ([facility]) M86.679    PAD (peripheral artery disease) ([facility]) I73.9    Other pulmonary embolism without acute cor pulmonale ([facility]) I26.99    Carotid stenosis, asymptomatic, bilateral I65.23    Lower limb amputation status Z89.619    Fx humeral neck, right, closed, initial encounter S42.211A    Transient hypotension I95.9    Constipation due to opioid therapy K59.03, T40.2X5A    Acute kidney injury ([address] X55.3    Complication of below knee amputation stump ([facility]) T87.9    COPD exacerbation ([facility]) J44.1    Hyponatremia E87.1    Pain, phantom limb ([facility]) G54.6    S/P BKA (below knee amputation) bilateral ([facility]) Z89.512, Z89.511    Moderate protein malnutrition ([facility]) E44.0    Essential hypertension I10    Uncontrolled type 2 diabetes mellitus with hyperglycemia ([facility]) E11.65    History of pulmonary embolism - 2017 Z86.711    Atelectasis J98.11    Uncontrolled type 2 diabetes mellitus with foot ulcer ([facility]) E11.621, L97.509, E11.65    Azotemia R79.89    Anemia, normocytic normochromic D64.9    Drug-induced constipation K59.03    Osteomyelitis of metatarsals of left foot ([facility]) M86.9    Diabetic foot infection ([facility]) E11.628, L08.9    Noncompliance Z91.19    Type 1 diabetes mellitus with diabetic foot infection ([facility]) E10.628, L08.9    Acute osteomyelitis of left foot ([facility]) M86.172    Cellulitis of left foot L03. 116    Mild malnutrition ([facility]) E44.1    Hypocalcemia E83.51    Hypokalemia E87.6    Moderate malnutrition ([facility]) E44.0    History of below knee amputation, right ([address] Z89.511    Foot ulcer with fat layer exposed, left ([address] L97.522    Chronic refractory osteomyelitis of left foot ([facility]) M86.672    Sepsis ([facility]) A41.9    Pyogenic inflammation of bone ([facility]) M86.9    Cellulitis of left lower extremity L03.116    History of below knee amputation, left ([facility]) Z89.512    Leg ulcer, left, with fat layer exposed [facility]) Z68.589    S/P amputation Z89.9    Tobacco abuse Z72.0    Pneumonia of right lower lobe due to infectious organism J18.9    Abdominal pain R10.9    Marijuana abuse F12.10    Parapneumonic effusion J18.9, J91.8    Hiatus hernia -large T05.8    Metabolic encephalopathy W21.70    Altered mental status R41.82       Isolation/Infection:   Isolation            Contact          Patient Infection Status       Infection Onset Added Last Indicated Last Indicated By Review Planned Expiration Resolved Resolved By    MRSA 02/03/21 02/05/21 04/03/21 Culture, Wound        2/2021 blood  Foot 4/3/21    VRE 08/24/20 08/27/20 08/24/20 Culture, Anaerobic and Aerobic        Foot - 12/2020    Resolved    COVID-19 Rule Out 11/03/21 11/03/21 11/03/21 COVID-19, Rapid (Ordered)   11/03/21 Rule-Out Test Resulted    COVID-19 Rule Out 08/29/21 08/29/21 08/29/21 Respiratory Panel, Molecular, with COVID-19 (Restricted: peds pts or suitable admitted adults) (Ordered)   08/30/21 Wayne Quinones RN    COVID-19 Rule Out 08/29/21 08/29/21 08/29/21 COVID-19, Rapid (Ordered)   08/29/21 Rule-Out Test Resulted    C-diff Rule Out 05/25/21 05/25/21 05/25/21 C DIFF TOXIN/ANTIGEN (Ordered)   05/27/21 Reny Campos RN    C-diff Rule Out 01/11/21 01/11/21 01/11/21 C DIFF TOXIN/ANTIGEN (Ordered)   02/05/21 Jayda Carrillo RN    COVID-19 Rule Out 08/22/20 08/22/20 08/23/20 COVID-19 (Ordered)   08/23/20 Rule-Out Test Resulted    COVID-19 Rule Out 08/01/20 08/01/20 08/01/20 COVID-19 (Ordered)   08/02/20 Josy Garcia RN    Test discontinued - negative result this admission    MRSA  01/02/17 01/02/17 Josy Garcia RN   07/31/20 Josy Garcia RN    2 negative nasal screen - 2020  Foot - 6/2018            Nurse Assessment:  Last Vital Signs: BP (!) 107/49   Pulse 88   Temp 98.2 °F (36.8 °C) (Oral)   Resp 16   Ht 6' 1\" (1.854 m)   Wt 151 lb (68.5 kg)   SpO2 95%   BMI 19.92 kg/m²     Last documented pain score (0-10 scale): Pain Level: 9  Last Weight:   Wt Readings from Last 1 Encounters:   11/06/21 151 lb (68.5 kg)     Mental Status:  oriented and alert    IV Access:  - PICC - site  R Basilic, insertion date: 11/6/2021    Nursing Mobility/ADLs:  Walking   Dependent  Transfer  Dependent  Bathing  Assisted  Dressing  Assisted  Toileting  Assisted  Feeding  Independent  Med [address]  Med Delivery   whole    Wound Care Documentation and Therapy:  Wound 08/30/21 Buttocks Left old healing wound (Active)   Number of days: 68       Wound 11/03/21 Leg Left circular necrotic ulceration (Active)   Dressing Status Clean; Dry; Intact 11/06/21 1916   Dressing/Treatment Ace wrap; Dry dressing 11/06/21 1916   Dressing Change Due 11/07/21 11/06/21 1916   Wound Assessment Other (Comment) 11/06/21 1916   Drainage Amount Moderate 11/06/21 1916   Drainage Description Sanguinous 11/06/21 1916   Odor Malodorous/putrid 11/06/21 1916   Odalys-wound Assessment Warm 11/06/21 1916   Number of days: 3        Elimination:  Continence: Bowel: Yes  Bladder: Yes  Urinary Catheter: None   Colostomy/Ileostomy/Ileal Conduit: No       Date of Last BM: 11/11/2021    Intake/Output Summary (Last 24 hours) at 11/7/2021 0340  Last data filed at 11/7/2021 0130  Gross per 24 hour   Intake --   Output 1350 ml   Net -1350 ml     I/O last 3 completed shifts:  In: -   Out: 1050 [Urine:1050]    Safety Concerns: At Risk for Falls because of bilateral BKA. Impairments/Disabilities:      Amputation - bilateral lower extremities    Nutrition Therapy:  Current Nutrition Therapy:   - Oral Diet:  Carb Control 4 carbs/meal (1800kcals/day)    Routes of Feeding: Oral  Liquids: No Restrictions  Daily Fluid Restriction: no  Last Modified Barium Swallow with Video (Video Swallowing Test): not done    Treatments at the Time of Hospital Discharge:   Respiratory Treatments: n/a  Oxygen Therapy:  is not on home oxygen therapy.   Ventilator:    - No ventilator support    Rehab Therapies: Physical Therapy and Occupational Therapy  Weight Bearing Status/Restrictions: No weight bearing restirctions  Other Medical Equipment (for information only, NOT a DME order):  wheelchair  Other Treatments: 600mg teflaro q12h IV until 12/8/21, wound vac to left stump wound with MWF dressing changes. Patient's personal belongings (please select all that are sent with patient):  None    RN SIGNATURE:  Electronically signed by Julius Garcia RN on 11/12/21 at 12:36 PM EST    CASE MANAGEMENT/SOCIAL WORK SECTION    Inpatient Status Date: ***    Readmission Risk Assessment Score:  Readmission Risk              Risk of Unplanned Readmission:  65           Discharging to Facility/ Agency   Name: [facility]  Address:  [address] [place] [address] (if applicable)   Name:  Address:  Dialysis Schedule:  Phone:  Fax:    / signature: Electronically signed by HUNTER Barragan on 11/13/21 at 9:13 AM EST    PHYSICIAN SECTION    Prognosis: Good    Condition at Discharge: Stable    Rehab Potential (if transferring to Rehab): Good    Recommended Labs or Other Treatments After Discharge:   Continue Teflaro through December 7, 2021  Check CBC, BMP, CRP every Monday. Patient will need to follow-up with Dr. Delilah Villarreal in the office in 3 to 4 weeks    Physician Certification: I certify the above information and transfer of Romero Parrish  is necessary for the continuing treatment of the diagnosis listed and that he requires [facility] for greater 30 days.      Update Admission H&P: No change in H&P    PHYSICIAN SIGNATURE:  Electronically signed by Jarrett Jimenez MD on 11/9/21 at 3:51 PM EST

## 2021-11-07 NOTE — PLAN OF CARE
Problem: Falls - Risk of:  Goal: Will remain free from falls  Description: Will remain free from falls  11/7/2021 0949 by Anushka Salguero RN  Outcome: Ongoing  Note: Room free of clutter  Hourly rounding   Non-skid socks worn  Side rails up x2  Bed low and locked  Call light in reach  Instructed to call out before getting out of bed  Anticipatory needs met  Bed alarm on  Falling star at the door      Problem: Pain:  Goal: Control of chronic pain  Description: Control of chronic pain  11/7/2021 0949 by Anushka Salguero RN  Outcome: Ongoing  Note: Pain level assessed and rated on a 0-10 scale  Assess characteristics of pain  PRN pain medication given per pt request  Non-pharmacological interventions implemented  Report ineffective pain management to physician  Update pt and family of any changes  Pt instructed to call out with new onset of pain  Continue to monitor       Problem: Skin Integrity:  Goal: Demonstration of wound healing without infection will improve  Description: Demonstration of wound healing without infection will improve  Outcome: Ongoing  Note: Patient has daily dressing changes. Will continue to monitor wound site.
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
## 2021-11-07 NOTE — PROGRESS NOTES
Patient removed his heart monitor and is refusing for it to be replaced. RN educated patient on the importance of the heart monitor. Will continue to monitor.

## 2021-11-07 NOTE — PROGRESS NOTES
Patient continues to turn off the bed alarm and get to the bedside commode independently. RN reinforced safety and fall risk precautions.

## 2021-11-07 NOTE — PROGRESS NOTES
New skin tear found on patients right stump. Patient states that he got himself up to the commode and then it started bleeding. Adhesive dressing applied. RN educated patient on the importance of calling when he needs to get up and fall risk precautions Will continue to monitor.

## 2021-11-08 ENCOUNTER — TELEPHONE (OUTPATIENT)
Dept: FAMILY MEDICINE CLINIC | Age: 66
End: 2021-11-08

## 2021-11-08 LAB
ANION GAP SERPL CALCULATED.3IONS-SCNC: 11 MMOL/L (ref 9–17)
BUN BLDV-MCNC: 19 MG/DL (ref 8–23)
BUN/CREAT BLD: 24 (ref 9–20)
CALCIUM SERPL-MCNC: 8.4 MG/DL (ref 8.6–10.4)
CHLORIDE BLD-SCNC: 105 MMOL/L (ref 98–107)
CO2: 23 MMOL/L (ref 20–31)
CREAT SERPL-MCNC: 0.79 MG/DL (ref 0.7–1.2)
GFR AFRICAN AMERICAN: >60 ML/MIN
GFR NON-AFRICAN AMERICAN: >60 ML/MIN
GFR SERPL CREATININE-BSD FRML MDRD: ABNORMAL ML/MIN/{1.73_M2}
GFR SERPL CREATININE-BSD FRML MDRD: ABNORMAL ML/MIN/{1.73_M2}
GLUCOSE BLD-MCNC: 108 MG/DL (ref 75–110)
GLUCOSE BLD-MCNC: 108 MG/DL (ref 75–110)
GLUCOSE BLD-MCNC: 220 MG/DL (ref 75–110)
GLUCOSE BLD-MCNC: 393 MG/DL (ref 75–110)
GLUCOSE BLD-MCNC: 95 MG/DL (ref 70–99)
POTASSIUM SERPL-SCNC: 3.9 MMOL/L (ref 3.7–5.3)
SODIUM BLD-SCNC: 139 MMOL/L (ref 135–144)

## 2021-11-08 PROCEDURE — 2580000003 HC RX 258: Performed by: NURSE PRACTITIONER

## 2021-11-08 PROCEDURE — 6360000002 HC RX W HCPCS: Performed by: INTERNAL MEDICINE

## 2021-11-08 PROCEDURE — 6370000000 HC RX 637 (ALT 250 FOR IP): Performed by: NURSE PRACTITIONER

## 2021-11-08 PROCEDURE — 1200000000 HC SEMI PRIVATE

## 2021-11-08 PROCEDURE — 99232 SBSQ HOSP IP/OBS MODERATE 35: CPT | Performed by: INTERNAL MEDICINE

## 2021-11-08 PROCEDURE — 6360000002 HC RX W HCPCS: Performed by: NURSE PRACTITIONER

## 2021-11-08 PROCEDURE — 80048 BASIC METABOLIC PNL TOTAL CA: CPT

## 2021-11-08 PROCEDURE — 36415 COLL VENOUS BLD VENIPUNCTURE: CPT

## 2021-11-08 PROCEDURE — 82947 ASSAY GLUCOSE BLOOD QUANT: CPT

## 2021-11-08 PROCEDURE — 97110 THERAPEUTIC EXERCISES: CPT

## 2021-11-08 PROCEDURE — 6370000000 HC RX 637 (ALT 250 FOR IP): Performed by: INTERNAL MEDICINE

## 2021-11-08 PROCEDURE — 97535 SELF CARE MNGMENT TRAINING: CPT

## 2021-11-08 PROCEDURE — 99232 SBSQ HOSP IP/OBS MODERATE 35: CPT | Performed by: SURGERY

## 2021-11-08 PROCEDURE — 99231 SBSQ HOSP IP/OBS SF/LOW 25: CPT | Performed by: INTERNAL MEDICINE

## 2021-11-08 RX ADMIN — OXYCODONE AND ACETAMINOPHEN 1 TABLET: 5; 325 TABLET ORAL at 21:30

## 2021-11-08 RX ADMIN — Medication 2 MG: at 19:16

## 2021-11-08 RX ADMIN — METOPROLOL TARTRATE 25 MG: 25 TABLET, FILM COATED ORAL at 21:42

## 2021-11-08 RX ADMIN — OXYCODONE AND ACETAMINOPHEN 1 TABLET: 5; 325 TABLET ORAL at 12:56

## 2021-11-08 RX ADMIN — SODIUM CHLORIDE 25 ML: 9 INJECTION, SOLUTION INTRAVENOUS at 21:45

## 2021-11-08 RX ADMIN — HYDROXYZINE HYDROCHLORIDE 25 MG: 25 TABLET, FILM COATED ORAL at 21:30

## 2021-11-08 RX ADMIN — Medication 2 MG: at 03:56

## 2021-11-08 RX ADMIN — DOCUSATE SODIUM 100 MG: 100 CAPSULE, LIQUID FILLED ORAL at 08:28

## 2021-11-08 RX ADMIN — Medication 1 TABLET: at 08:28

## 2021-11-08 RX ADMIN — METOPROLOL TARTRATE 25 MG: 25 TABLET, FILM COATED ORAL at 08:28

## 2021-11-08 RX ADMIN — FAMOTIDINE 20 MG: 20 TABLET ORAL at 21:42

## 2021-11-08 RX ADMIN — OXYCODONE AND ACETAMINOPHEN 1 TABLET: 5; 325 TABLET ORAL at 17:38

## 2021-11-08 RX ADMIN — INSULIN LISPRO 5 UNITS: 100 INJECTION, SOLUTION INTRAVENOUS; SUBCUTANEOUS at 21:33

## 2021-11-08 RX ADMIN — CEFEPIME HYDROCHLORIDE 2000 MG: 2 INJECTION, POWDER, FOR SOLUTION INTRAVENOUS at 02:41

## 2021-11-08 RX ADMIN — FERROUS SULFATE TAB EC 325 MG (65 MG FE EQUIVALENT) 325 MG: 325 (65 FE) TABLET DELAYED RESPONSE at 08:28

## 2021-11-08 RX ADMIN — FERROUS SULFATE TAB EC 325 MG (65 MG FE EQUIVALENT) 325 MG: 325 (65 FE) TABLET DELAYED RESPONSE at 21:30

## 2021-11-08 RX ADMIN — PROBIOTIC PRODUCT - TAB 1 TABLET: TAB at 21:31

## 2021-11-08 RX ADMIN — INSULIN LISPRO 4 UNITS: 100 INJECTION, SOLUTION INTRAVENOUS; SUBCUTANEOUS at 17:38

## 2021-11-08 RX ADMIN — CEFEPIME HYDROCHLORIDE 2000 MG: 2 INJECTION, POWDER, FOR SOLUTION INTRAVENOUS at 14:06

## 2021-11-08 RX ADMIN — ATORVASTATIN CALCIUM 10 MG: 10 TABLET, FILM COATED ORAL at 21:42

## 2021-11-08 RX ADMIN — SODIUM CHLORIDE, PRESERVATIVE FREE 10 ML: 5 INJECTION INTRAVENOUS at 21:32

## 2021-11-08 RX ADMIN — FAMOTIDINE 20 MG: 20 TABLET ORAL at 08:28

## 2021-11-08 RX ADMIN — PROBIOTIC PRODUCT - TAB 1 TABLET: TAB at 08:28

## 2021-11-08 RX ADMIN — APIXABAN 5 MG: 5 TABLET, FILM COATED ORAL at 21:32

## 2021-11-08 RX ADMIN — TAMSULOSIN HYDROCHLORIDE 0.4 MG: 0.4 CAPSULE ORAL at 21:32

## 2021-11-08 RX ADMIN — INSULIN GLARGINE 25 UNITS: 100 INJECTION, SOLUTION SUBCUTANEOUS at 21:33

## 2021-11-08 RX ADMIN — AMITRIPTYLINE HYDROCHLORIDE 75 MG: 50 TABLET, FILM COATED ORAL at 21:31

## 2021-11-08 RX ADMIN — Medication 2 MG: at 14:03

## 2021-11-08 RX ADMIN — OXYCODONE AND ACETAMINOPHEN 1 TABLET: 5; 325 TABLET ORAL at 02:41

## 2021-11-08 RX ADMIN — APIXABAN 5 MG: 5 TABLET, FILM COATED ORAL at 08:28

## 2021-11-08 RX ADMIN — DOCUSATE SODIUM 100 MG: 100 CAPSULE, LIQUID FILLED ORAL at 21:32

## 2021-11-08 RX ADMIN — Medication 2 MG: at 09:05

## 2021-11-08 RX ADMIN — OXYCODONE AND ACETAMINOPHEN 1 TABLET: 5; 325 TABLET ORAL at 07:28

## 2021-11-08 RX ADMIN — ASPIRIN 81 MG: 81 TABLET, COATED ORAL at 08:28

## 2021-11-08 RX ADMIN — Medication 2 MG: at 23:28

## 2021-11-08 RX ADMIN — Medication 3 MG: at 21:30

## 2021-11-08 ASSESSMENT — PAIN SCALES - GENERAL
PAINLEVEL_OUTOF10: 8
PAINLEVEL_OUTOF10: 8
PAINLEVEL_OUTOF10: 7
PAINLEVEL_OUTOF10: 6
PAINLEVEL_OUTOF10: 8
PAINLEVEL_OUTOF10: 7
PAINLEVEL_OUTOF10: 8
PAINLEVEL_OUTOF10: 8
PAINLEVEL_OUTOF10: 9
PAINLEVEL_OUTOF10: 8
PAINLEVEL_OUTOF10: 7
PAINLEVEL_OUTOF10: 8

## 2021-11-08 ASSESSMENT — PAIN DESCRIPTION - FREQUENCY
FREQUENCY: CONTINUOUS

## 2021-11-08 ASSESSMENT — PAIN DESCRIPTION - DESCRIPTORS
DESCRIPTORS: ACHING;DISCOMFORT;SORE
DESCRIPTORS: ACHING;DISCOMFORT
DESCRIPTORS: ACHING;CONSTANT;DISCOMFORT

## 2021-11-08 ASSESSMENT — PAIN DESCRIPTION - LOCATION
LOCATION: LEG
LOCATION: LEG
LOCATION: GENERALIZED
LOCATION: LEG
LOCATION: GENERALIZED

## 2021-11-08 ASSESSMENT — PAIN DESCRIPTION - ORIENTATION
ORIENTATION: RIGHT;LEFT

## 2021-11-08 ASSESSMENT — ENCOUNTER SYMPTOMS
GASTROINTESTINAL NEGATIVE: 1
ALLERGIC/IMMUNOLOGIC NEGATIVE: 1
RESPIRATORY NEGATIVE: 1

## 2021-11-08 ASSESSMENT — PAIN DESCRIPTION - PAIN TYPE
TYPE: CHRONIC PAIN
TYPE: ACUTE PAIN
TYPE: CHRONIC PAIN
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
## 2021-11-08 NOTE — PROGRESS NOTES
evaluation for worsening wound of the left stump without any associated fevers chills nausea or vomiting. The patient has an open wound of the left BKA site and with what seems to be a draining hematoma. He was seen by the vascular surgery team today and there is some concern for cellulitis he was started on antimicrobial therapy. He currently is doing local wound care and I was asked to evaluate and help with antibiotic choice. Current evaluation:2021    BP (!) 113/56   Pulse 80   Temp 97.4 °F (36.3 °C) (Oral)   Resp 16   Ht 6' 1\" (1.854 m)   Wt 155 lb 3.2 oz (70.4 kg)   SpO2 97%   BMI 20.48 kg/m²     Temperature Range: Temp: 97.4 °F (36.3 °C) Temp  Av.9 °F (36.6 °C)  Min: 97.4 °F (36.3 °C)  Max: 98.3 °F (36.8 °C)  The patient is seen and evaluated at bedside and is awake and alert in no acute distress. No subjective fever or chills. No cough or shortness of breath. No abdominal pain nausea or vomiting. Sinus pain at the stump site. Review of Systems   Constitutional: Negative. Respiratory: Negative. Cardiovascular: Negative. Gastrointestinal: Negative. Genitourinary: Negative. Musculoskeletal: Negative. Skin: Positive for wound. Allergic/Immunologic: Negative. Neurological: Negative. Physical Examination :     Physical Exam  Constitutional:       Appearance: He is well-developed. HENT:      Head: Normocephalic and atraumatic. Cardiovascular:      Rate and Rhythm: Normal rate. Heart sounds: Normal heart sounds. No friction rub. No gallop. Pulmonary:      Effort: Pulmonary effort is normal.      Breath sounds: Normal breath sounds. No wheezing. Abdominal:      General: Bowel sounds are normal.      Palpations: Abdomen is soft. There is no mass. Tenderness: There is no abdominal tenderness. Musculoskeletal:         General: Normal range of motion. Cervical back: Normal range of motion and neck supple.       Comments: Bilateral knee amputations. Lymphadenopathy:      Cervical: No cervical adenopathy. Skin:     General: Skin is warm and dry. Comments: The dressings at the BKA stumps not removed. Neurological:      Mental Status: He is alert and oriented to person, place, and time. Laboratory data:   I have independently reviewed the followinglabs:  CBC with Differential:   No results for input(s): WBC, HGB, HCT, PLT, SEGSPCT, BANDSPCT, LYMPHOPCT, MONOPCT, EOSPCT in the last 72 hours. BMP:   Recent Labs     11/07/21  0602 11/08/21  0703    139   K 4.2 3.9    105   CO2 23 23   BUN 14 19   CREATININE 0.77 0.79     Hepatic Function Panel:   No results for input(s): PROT, LABALBU, BILIDIR, IBILI, BILITOT, ALKPHOS, ALT, AST in the last 72 hours. Lab Results   Component Value Date    PROCAL 0.13 05/25/2021    PROCAL 0.11 05/25/2021     Lab Results   Component Value Date    .7 11/04/2021    CRP 91.6 04/05/2021    .0 02/03/2021     Lab Results   Component Value Date    SEDRATE 97 (H) 11/04/2021         Lab Results   Component Value Date    DDIMER 0.39 06/29/2020    DDIMER 1.63 12/20/2017    DDIMER 0.68 12/25/2011     Lab Results   Component Value Date    FERRITIN 56 02/09/2021    FERRITIN 64 01/25/2014     No results found for: LDH  No results found for: FIBRINOGEN    Results in Past 30 Days  Result Component Current Result Ref Range Previous Result Ref Range   SARS-CoV-2, Rapid Not Detected (11/3/2021) Not Detected Not in Time Range      Lab Results   Component Value Date    COVID19 Not Detected 11/03/2021    COVID19 Not Detected 09/02/2021    COVID19 Not Detected 08/29/2021    COVID19 Not Detected 08/23/2020       Recent Labs     11/05/21  1305   [address] 18.5       Imaging Studies:   No new imaging    Cultures:     Culture, Blood 1 [[id number]] Collected: 11/04/21 0005   Order Status: Completed Specimen: Blood Updated: 11/07/21 1214    Specimen Description . BLOOD    Special Requests RT AC 10ML Culture NO GROWTH 3 DAYS   Culture, Blood 1 [[id number]] Collected: 11/04/21 0005   Order Status: Completed Specimen: Blood Updated: 11/07/21 1214    Specimen Description . BLOOD    Special Requests RT HAND 7ML    Culture NO GROWTH 3 DAYS     Culture, Anaerobic and Aerobic [[id number]] (Abnormal)  Collected: 11/04/21 2145   Order Status: Completed Specimen: Abscess Updated: 11/08/21 0843    Specimen Description . ABSCESS    Special Requests NOT REPORTED    Direct Exam MANY NEUTROPHILS Abnormal      RARE GRAM POSITIVE COCCI IN CLUSTERS Abnormal     Culture METHICILLIN RESISTANT STAPHYLOCOCCUS AUREUS MODERATE GROWTH Abnormal      NO ANAEROBIC ORGANISMS ISOLATED AT 3 DAYS Abnormal      Culture, Blood 1 [[id number]] Collected: 11/04/21 0005   Order Status: Completed Specimen: Blood Updated: 11/07/21 1214    Specimen Description . BLOOD    Special Requests RT AC 10ML    Culture NO GROWTH 3 DAYS   Culture, Blood 1 [[id number]] Collected: 11/04/21 0005   Order Status: Completed Specimen: Blood Updated: 11/07/21 1214    Specimen Description . BLOOD    Special Requests RT HAND 7ML    Culture NO GROWTH 3 DAYS     Wound Culture [[id number]] (Abnormal)  Collected: 11/04/21 0020   Order Status: Completed Specimen: No Site Given from Skin Updated: 11/06/21 0700    Specimen Description . SKIN LT LEG STUMP    Special Requests NOT REPORTED    Direct Exam MODERATE NEUTROPHILS Abnormal      FEW GRAM NEGATIVE RODS Abnormal      FEW GRAM POSITIVE COCCI IN CLUSTERS Abnormal     Culture STAPHYLOCOCCUS AUREUS MODERATE GROWTH This isolate is methicillin susceptible. Abnormal      KLEBSIELLA AEROGENES MODERATE GROWTH Abnormal    Susceptibility     Staphylococcus aureus Klebsiella aerogenes     BACTERIAL SUSCEPTIBILITY PANEL DARIAN BACTERIAL SUSCEPTIBILITY PANEL DARIAN     amikacin   NOT REPORTED       aztreonam   <=1  Sensitive     ceFAZolin   >=64  Resistant     cefepime   NOT REPORTED       cefoxitin screen NOT REPORTED         cefTRIAXone   <=1 Sensitive     ciprofloxacin NOT REPORTED   <=0.25  Sensitive     clindamycin <=0.25  Sensitive       ertapenem   NOT REPORTED       erythromycin <=0.25  Sensitive       gentamicin <=0.5   Gentamicin . .. Sensitive  Sensitive <=1  Sensitive     Induced Clind Resist NOT REPORTED         levofloxacin 0.25  Sensitive       linezolid NOT REPORTED         meropenem   NOT REPORTED       moxifloxacin NOT REPORTED         nitrofurantoin NOT REPORTED   NOT REPORTED       oxacillin 0.5  Sensitive       penicillin >=0.5  Resistant       piperacillin-tazobactam   <=4  Sensitive     rifampin NOT REPORTED         Synercid NOT REPORTED         tetracycline <=1  Sensitive       tigecycline NOT REPORTED   NOT REPORTED       tobramycin   <=1  Sensitive     trimethoprim-sulfamethoxazole <=10  Sensitive <=20  Sensitive     vancomycin NOT REPORTED          Culture, Urine [[id number]] Collected: 11/03/21 2104   Order Status: Completed Specimen: Urine, clean catch Updated: 11/04/21 2328    Specimen Description . CLEAN CATCH URINE    Special Requests NOT REPORTED    Culture NO SIGNIFICANT GROWTH   Wound Gram stain [[id number]] Collected: 11/04/21 0015   Order Status: Canceled Specimen: No Site Given from Wound    COVID-19, Rapid [[id number]] Collected: 11/03/21 2050   Order Status: Completed Specimen: Nasopharyngeal Swab Updated: 11/03/21 2127    Specimen Description . NASOPHARYNGEAL SWAB    SARS-CoV-2, Rapid Not Detected    Comment:        Rapid NAAT:  The specimen is NEGATIVE for SARS-CoV-2, the novel coronavirus associated with   COVID-19.         The ID NOW COVID-19 assay is designed to detect the virus that causes COVID-19 in patients   with signs and symptoms of infection who are suspected of COVID-19. An individual without symptoms of COVID-19 and who is not shedding SARS-CoV-2 virus would   expect to have a negative (not detected) result in this assay.    Negative results should be treated as presumptive and, if inconsistent with clinical signs   and symptoms or necessary for patient management,   should be tested with an alternative molecular assay. Negative results do not preclude   SARS-CoV-2 infection and   should not be used as the sole basis for patient management decisions.         Fact sheet for Healthcare Providers: Lazaro   Fact sheet for Patients: Lazaro           Methodology: Isothermal Nucleic Acid Amplification         Medications:      amitriptyline  75 mg Oral Nightly    apixaban  5 mg Oral BID    aspirin EC  81 mg Oral Daily    atorvastatin  10 mg Oral Nightly    docusate sodium  100 mg Oral BID    famotidine  20 mg Oral BID    ferrous sulfate  325 mg Oral BID    insulin glargine  25 Units SubCUTAneous Nightly    lactobacillus  1 tablet Oral BID    metoprolol tartrate  25 mg Oral BID    therapeutic multivitamin-minerals  1 tablet Oral Daily    tamsulosin  0.4 mg Oral Nightly    sodium chloride flush  5-40 mL IntraVENous 2 times per day    insulin lispro  0-12 Units SubCUTAneous TID WC    insulin lispro  0-6 Units SubCUTAneous Nightly    cefepime  2,000 mg IntraVENous Q12H           [facility]  Jem Fitzgerald MD  [employer] messaging  OFFICE: [phone]      Electronically signed by Jem Fitzgerald MD on 11/8/2021 at 11:07 AM  Thank you for allowing us to participate in the care of this patient. Please call with questions. This note iscreated with the assistance of a speech recognition program.  While intending to generate a document that actually reflects the content of the visit, the document can still have some errors including those of syntax andsound a like substitutions which may escape proof reading. In such instances, actual meaning can be extrapolated by contextual diversion.

## 2021-11-08 NOTE — PROGRESS NOTES
appearance: alert, cooperative and no distress  Mental Status: oriented to person, place and time with normal affect  Neck: good carotid pulses, no JVD  Lungs: clear to auscultation bilaterally, normal effort  Heart: regular rate and rhythm, no murmur,  Abdomen: soft, non-tender, non-distended, bowel sounds present all four quadrants, no masses, hepatomegaly, splenomegaly or aortic enlargement  Extremities: dry eschar on anterior aspect of right BKA wound, left BKA site with dry eschar distally and open area laterally with some exposed bone adherent to the skin    Assessment:   3 61year-old noncompliant diabetic male with open wound on the left BKA site secondary to trauma with probable infected hematoma    Patient Active Problem List:     Type 2 diabetes mellitus with diabetic polyneuropathy, with long-term current use of insulin (HCC)     Neuropathic pain, leg     Diabetic polyneuropathy (HCC)     Allergic rhinitis     Tobacco dependence     Edentulous     Dysphagia     Lung nodules     Esophageal cancer (HCC)     Fracture of humerus, left, closed     Closed fracture of humerus     DM type 2 with diabetic peripheral neuropathy (HCC)     COPD without exacerbation (HCC)     HLD (hyperlipidemia)     Gastroesophageal reflux disease     Chronic pain syndrome     Marijuana use     History of colon polyps     Functional diarrhea     Sepsis due to methicillin resistant Staphylococcus aureus (MRSA) (HCC)     History of esophageal cancer     Osteomyelitis, chronic, ankle or foot (HCC)     PAD (peripheral artery disease) ([address]     Other pulmonary embolism without acute cor pulmonale (HCC)     Carotid stenosis, asymptomatic, bilateral     Lower limb amputation status     Fx humeral neck, right, closed, initial encounter     Transient hypotension     Constipation due to opioid therapy     Acute kidney injury ([address]     Complication of below knee amputation stump (HCC)     COPD exacerbation (HCC)     Hyponatremia     Pain, phantom limb (HCC)     S/P BKA (below knee amputation) bilateral (HCC)     Moderate protein malnutrition ([address]     Essential hypertension     Uncontrolled type 2 diabetes mellitus with hyperglycemia ([address]     History of pulmonary embolism - 2017     Atelectasis     Uncontrolled type 2 diabetes mellitus with foot ulcer (HCC)     Azotemia     Anemia, normocytic normochromic     Drug-induced constipation     Osteomyelitis of metatarsals of left foot (HCC)     Diabetic foot infection ([address]     Noncompliance     Type 1 diabetes mellitus with diabetic foot infection ([address]     Acute osteomyelitis of left foot (HCC)     Cellulitis of left foot     Mild malnutrition (HCC)     Hypocalcemia     Hypokalemia     Moderate malnutrition ([address]     History of below knee amputation, right ([address]     Foot ulcer with fat layer exposed, left ([address]     Chronic refractory osteomyelitis of left foot ([address]     Sepsis ([address]     Pyogenic inflammation of bone (HCC)     Cellulitis of left lower extremity     History of below knee amputation, left (HCC)     Leg ulcer, left, with fat layer exposed ([address]     S/P amputation     Tobacco abuse     Pneumonia of right lower lobe due to infectious organism     Abdominal pain     Marijuana abuse     Parapneumonic effusion     Hiatus hernia -large     Metabolic encephalopathy     Altered mental status      Plan:   1. Continue dressing changes  2. Continue cefepime  3.  Plan wound VAC Monday or Tuesday    Electronically signed by Susan Jones MD on 11/7/2021 at 8:16 PM

## 2021-11-08 NOTE — CARE COORDINATION
Social work; spoke to daughter to see if she is getting pt to agree to move to [place] with her into an apt that is accessible down the street from daughter. That is another option for pt. Also trying for snf due to care needs.   Andrew camacho

## 2021-11-08 NOTE — PLAN OF CARE
Problem: Falls - Risk of:  Goal: Will remain free from falls  Description: Will remain free from falls  11/8/2021 1616 by Nilda Holguin RN  Outcome: Ongoing     Problem: Falls - Risk of:  Goal: Absence of physical injury  Description: Absence of physical injury  Outcome: Ongoing     Problem: Pain:  Goal: Pain level will decrease  Description: Pain level will decrease  11/8/2021 1616 by Nilda Holguin RN  Outcome: Ongoing     Problem: Pain:  Goal: Control of acute pain  Description: Control of acute pain  Outcome: Ongoing     Problem: Pain:  Goal: Control of chronic pain  Description: Control of chronic pain  Outcome: Ongoing     Problem: Skin Integrity:  Goal: Demonstration of wound healing without infection will improve  Description: Demonstration of wound healing without infection will improve  11/8/2021 1616 by Nilda Holguin RN  Outcome: Ongoing     Problem: Skin Integrity:  Goal: Will show no infection signs and symptoms  Description: Will show no infection signs and symptoms  11/8/2021 1616 by Nilda Holguin RN  Outcome: Ongoing     Problem: Skin Integrity:  Goal: Absence of new skin breakdown  Description: Absence of new skin breakdown  11/8/2021 1616 by Nilda Holguin RN  Outcome: Ongoing

## 2021-11-08 NOTE — PROGRESS NOTES
appearance: alert, cooperative and no distress  Mental Status: oriented to person, place and time with normal affect  Neck: good carotid pulses, no JVD  Lungs: clear to auscultation bilaterally, normal effort  Heart: regular rate and rhythm, no murmur,  Abdomen: soft, non-tender, non-distended, bowel sounds present all four quadrants, no masses, hepatomegaly, splenomegaly or aortic enlargement  Extremities: dry eschar on anterior aspect of right BKA wound, left BKA site with dry eschar distally and open area laterally with some exposed bone adherent to the skin    Assessment:   3 61year-old noncompliant diabetic male with open wound on the left BKA site secondary to trauma with probable infected hematoma    Patient Active Problem List:     Type 2 diabetes mellitus with diabetic polyneuropathy, with long-term current use of insulin (HCC)     Neuropathic pain, leg     Diabetic polyneuropathy (HCC)     Allergic rhinitis     Tobacco dependence     Edentulous     Dysphagia     Lung nodules     Esophageal cancer (HCC)     Fracture of humerus, left, closed     Closed fracture of humerus     DM type 2 with diabetic peripheral neuropathy (HCC)     COPD without exacerbation (HCC)     HLD (hyperlipidemia)     Gastroesophageal reflux disease     Chronic pain syndrome     Marijuana use     History of colon polyps     Functional diarrhea     Sepsis due to methicillin resistant Staphylococcus aureus (MRSA) (HCC)     History of esophageal cancer     Osteomyelitis, chronic, ankle or foot (HCC)     PAD (peripheral artery disease) ([address]     Other pulmonary embolism without acute cor pulmonale (HCC)     Carotid stenosis, asymptomatic, bilateral     Lower limb amputation status     Fx humeral neck, right, closed, initial encounter     Transient hypotension     Constipation due to opioid therapy     Acute kidney injury ([address]     Complication of below knee amputation stump (HCC)     COPD exacerbation (HCC)     Hyponatremia     Pain, phantom limb (HCC)     S/P BKA (below knee amputation) bilateral (HCC)     Moderate protein malnutrition ([address]     Essential hypertension     Uncontrolled type 2 diabetes mellitus with hyperglycemia ([address]     History of pulmonary embolism - 2017     Atelectasis     Uncontrolled type 2 diabetes mellitus with foot ulcer (HCC)     Azotemia     Anemia, normocytic normochromic     Drug-induced constipation     Osteomyelitis of metatarsals of left foot (HCC)     Diabetic foot infection ([address]     Noncompliance     Type 1 diabetes mellitus with diabetic foot infection ([address]     Acute osteomyelitis of left foot (HCC)     Cellulitis of left foot     Mild malnutrition (HCC)     Hypocalcemia     Hypokalemia     Moderate malnutrition ([address]     History of below knee amputation, right ([address]     Foot ulcer with fat layer exposed, left ([address]     Chronic refractory osteomyelitis of left foot ([address]     Sepsis ([address]     Pyogenic inflammation of bone (HCC)     Cellulitis of left lower extremity     History of below knee amputation, left (HCC)     Leg ulcer, left, with fat layer exposed ([address]     S/P amputation     Tobacco abuse     Pneumonia of right lower lobe due to infectious organism     Abdominal pain     Marijuana abuse     Parapneumonic effusion     Hiatus hernia -large     Metabolic encephalopathy     Altered mental status      Plan:   1. Continue dressing changes  2. Continue cefepime  3.  Place wound [facility] tomorrow    Electronically signed by Cleopatra Kwok MD on 11/8/2021 at 5:30 PM
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
## 2021-11-08 NOTE — CARE COORDINATION
Social work: Met with patient again to discuss snf. Pt states he is \"50/50\" about going to snf. [facility] list reviewed, patient agreed to writer sending referrals to [facility] and [facility] in [place]. Cher/manager also received a call from [facility]. stating they will not accept patient back on service at discharge if he goes home.

## 2021-11-08 NOTE — PLAN OF CARE
Problem: Falls - Risk of:  Goal: Will remain free from falls  Outcome: Ongoing     Problem: Pain:  Goal: Pain level will decrease  Outcome: Ongoing     Problem: Skin Integrity:  Goal: Demonstration of wound healing without infection will improve  Outcome: Ongoing     Problem: Skin Integrity:  Goal: Will show no infection signs and symptoms  Outcome: Ongoing     Problem: Skin Integrity:  Goal: Absence of new skin breakdown  Outcome: Ongoing

## 2021-11-08 NOTE — TELEPHONE ENCOUNTER
Marnie Diaz @ [id number] to follow up:  Faxed order for hand splint on 11/3/2021    Order was also faxed 11/8/2021    Phone: [phone]    Fax: [phone]

## 2021-11-08 NOTE — CARE COORDINATION
Discharge Planning:    Referrals faxed to [facility] and [facility]. The Plan for Transition of Care is related to the following treatment goals: SN/PT/OT     The Patient and/or patient representative was provided with a choice of provider and agrees   with the discharge plan. [x] Yes [] No    Freedom of choice list was provided with basic dialogue that supports the patient's individualized plan of care/goals, treatment preferences and shares the quality data associated with the providers.  [x] Yes [] No

## 2021-11-09 LAB
ANION GAP SERPL CALCULATED.3IONS-SCNC: 11 MMOL/L (ref 9–17)
BUN BLDV-MCNC: 23 MG/DL (ref 8–23)
BUN/CREAT BLD: 24 (ref 9–20)
CALCIUM SERPL-MCNC: 8.6 MG/DL (ref 8.6–10.4)
CHLORIDE BLD-SCNC: 103 MMOL/L (ref 98–107)
CO2: 24 MMOL/L (ref 20–31)
CREAT SERPL-MCNC: 0.96 MG/DL (ref 0.7–1.2)
GFR AFRICAN AMERICAN: >60 ML/MIN
GFR NON-AFRICAN AMERICAN: >60 ML/MIN
GFR SERPL CREATININE-BSD FRML MDRD: ABNORMAL ML/MIN/{1.73_M2}
GFR SERPL CREATININE-BSD FRML MDRD: ABNORMAL ML/MIN/{1.73_M2}
GLUCOSE BLD-MCNC: 178 MG/DL (ref 75–110)
GLUCOSE BLD-MCNC: 192 MG/DL (ref 75–110)
GLUCOSE BLD-MCNC: 235 MG/DL (ref 75–110)
GLUCOSE BLD-MCNC: 252 MG/DL (ref 70–99)
GLUCOSE BLD-MCNC: 275 MG/DL (ref 75–110)
POTASSIUM SERPL-SCNC: 5 MMOL/L (ref 3.7–5.3)
SODIUM BLD-SCNC: 138 MMOL/L (ref 135–144)

## 2021-11-09 PROCEDURE — 97535 SELF CARE MNGMENT TRAINING: CPT

## 2021-11-09 PROCEDURE — 6360000002 HC RX W HCPCS: Performed by: NURSE PRACTITIONER

## 2021-11-09 PROCEDURE — 36415 COLL VENOUS BLD VENIPUNCTURE: CPT

## 2021-11-09 PROCEDURE — 2580000003 HC RX 258: Performed by: NURSE PRACTITIONER

## 2021-11-09 PROCEDURE — 99232 SBSQ HOSP IP/OBS MODERATE 35: CPT | Performed by: INTERNAL MEDICINE

## 2021-11-09 PROCEDURE — 6370000000 HC RX 637 (ALT 250 FOR IP): Performed by: INTERNAL MEDICINE

## 2021-11-09 PROCEDURE — 6360000002 HC RX W HCPCS: Performed by: INTERNAL MEDICINE

## 2021-11-09 PROCEDURE — 97110 THERAPEUTIC EXERCISES: CPT

## 2021-11-09 PROCEDURE — 80048 BASIC METABOLIC PNL TOTAL CA: CPT

## 2021-11-09 PROCEDURE — 6370000000 HC RX 637 (ALT 250 FOR IP): Performed by: NURSE PRACTITIONER

## 2021-11-09 PROCEDURE — 99232 SBSQ HOSP IP/OBS MODERATE 35: CPT | Performed by: SURGERY

## 2021-11-09 PROCEDURE — 2580000003 HC RX 258: Performed by: INTERNAL MEDICINE

## 2021-11-09 PROCEDURE — 82947 ASSAY GLUCOSE BLOOD QUANT: CPT

## 2021-11-09 PROCEDURE — 1200000000 HC SEMI PRIVATE

## 2021-11-09 RX ADMIN — Medication 2 MG: at 20:53

## 2021-11-09 RX ADMIN — CEFEPIME HYDROCHLORIDE 2000 MG: 2 INJECTION, POWDER, FOR SOLUTION INTRAVENOUS at 02:43

## 2021-11-09 RX ADMIN — AMITRIPTYLINE HYDROCHLORIDE 75 MG: 50 TABLET, FILM COATED ORAL at 20:54

## 2021-11-09 RX ADMIN — Medication 2 MG: at 13:00

## 2021-11-09 RX ADMIN — OXYCODONE AND ACETAMINOPHEN 1 TABLET: 5; 325 TABLET ORAL at 01:22

## 2021-11-09 RX ADMIN — INSULIN GLARGINE 25 UNITS: 100 INJECTION, SOLUTION SUBCUTANEOUS at 20:52

## 2021-11-09 RX ADMIN — FAMOTIDINE 20 MG: 20 TABLET ORAL at 20:54

## 2021-11-09 RX ADMIN — Medication 1 TABLET: at 08:43

## 2021-11-09 RX ADMIN — PROBIOTIC PRODUCT - TAB 1 TABLET: TAB at 20:54

## 2021-11-09 RX ADMIN — ASPIRIN 81 MG: 81 TABLET, COATED ORAL at 08:44

## 2021-11-09 RX ADMIN — OXYCODONE AND ACETAMINOPHEN 1 TABLET: 5; 325 TABLET ORAL at 16:57

## 2021-11-09 RX ADMIN — CEFTAROLINE FOSAMIL 600 MG: 600 POWDER, FOR SOLUTION INTRAVENOUS at 16:58

## 2021-11-09 RX ADMIN — INSULIN LISPRO 3 UNITS: 100 INJECTION, SOLUTION INTRAVENOUS; SUBCUTANEOUS at 20:52

## 2021-11-09 RX ADMIN — FERROUS SULFATE TAB EC 325 MG (65 MG FE EQUIVALENT) 325 MG: 325 (65 FE) TABLET DELAYED RESPONSE at 08:44

## 2021-11-09 RX ADMIN — ATORVASTATIN CALCIUM 10 MG: 10 TABLET, FILM COATED ORAL at 20:55

## 2021-11-09 RX ADMIN — DOCUSATE SODIUM 100 MG: 100 CAPSULE, LIQUID FILLED ORAL at 08:44

## 2021-11-09 RX ADMIN — APIXABAN 5 MG: 5 TABLET, FILM COATED ORAL at 08:44

## 2021-11-09 RX ADMIN — DOCUSATE SODIUM 100 MG: 100 CAPSULE, LIQUID FILLED ORAL at 20:55

## 2021-11-09 RX ADMIN — APIXABAN 5 MG: 5 TABLET, FILM COATED ORAL at 20:55

## 2021-11-09 RX ADMIN — PROBIOTIC PRODUCT - TAB 1 TABLET: TAB at 08:44

## 2021-11-09 RX ADMIN — TAMSULOSIN HYDROCHLORIDE 0.4 MG: 0.4 CAPSULE ORAL at 20:54

## 2021-11-09 RX ADMIN — FERROUS SULFATE TAB EC 325 MG (65 MG FE EQUIVALENT) 325 MG: 325 (65 FE) TABLET DELAYED RESPONSE at 20:53

## 2021-11-09 RX ADMIN — FAMOTIDINE 20 MG: 20 TABLET ORAL at 08:44

## 2021-11-09 RX ADMIN — METOPROLOL TARTRATE 25 MG: 25 TABLET, FILM COATED ORAL at 20:54

## 2021-11-09 RX ADMIN — INSULIN LISPRO 2 UNITS: 100 INJECTION, SOLUTION INTRAVENOUS; SUBCUTANEOUS at 16:57

## 2021-11-09 RX ADMIN — INSULIN LISPRO 2 UNITS: 100 INJECTION, SOLUTION INTRAVENOUS; SUBCUTANEOUS at 12:59

## 2021-11-09 RX ADMIN — INSULIN LISPRO 4 UNITS: 100 INJECTION, SOLUTION INTRAVENOUS; SUBCUTANEOUS at 08:37

## 2021-11-09 RX ADMIN — OXYCODONE AND ACETAMINOPHEN 1 TABLET: 5; 325 TABLET ORAL at 11:23

## 2021-11-09 ASSESSMENT — PAIN SCALES - GENERAL
PAINLEVEL_OUTOF10: 8
PAINLEVEL_OUTOF10: 5
PAINLEVEL_OUTOF10: 7
PAINLEVEL_OUTOF10: 8
PAINLEVEL_OUTOF10: 0
PAINLEVEL_OUTOF10: 9
PAINLEVEL_OUTOF10: 7
PAINLEVEL_OUTOF10: 4
PAINLEVEL_OUTOF10: 5

## 2021-11-09 ASSESSMENT — PAIN - FUNCTIONAL ASSESSMENT: PAIN_FUNCTIONAL_ASSESSMENT: PREVENTS OR INTERFERES SOME ACTIVE ACTIVITIES AND ADLS

## 2021-11-09 ASSESSMENT — PAIN DESCRIPTION - ONSET: ONSET: ON-GOING

## 2021-11-09 ASSESSMENT — ENCOUNTER SYMPTOMS
GASTROINTESTINAL NEGATIVE: 1
RESPIRATORY NEGATIVE: 1
ALLERGIC/IMMUNOLOGIC NEGATIVE: 1

## 2021-11-09 ASSESSMENT — PAIN DESCRIPTION - LOCATION
LOCATION: GENERALIZED
LOCATION: LEG

## 2021-11-09 ASSESSMENT — PAIN DESCRIPTION - PAIN TYPE
TYPE: CHRONIC PAIN
TYPE: CHRONIC PAIN

## 2021-11-09 ASSESSMENT — PAIN DESCRIPTION - DESCRIPTORS: DESCRIPTORS: PINS AND NEEDLES

## 2021-11-09 ASSESSMENT — PAIN DESCRIPTION - FREQUENCY: FREQUENCY: CONTINUOUS

## 2021-11-09 ASSESSMENT — PAIN DESCRIPTION - ORIENTATION: ORIENTATION: RIGHT;LEFT

## 2021-11-09 ASSESSMENT — PAIN DESCRIPTION - PROGRESSION: CLINICAL_PROGRESSION: NOT CHANGED

## 2021-11-09 NOTE — CARE COORDINATION
Social work: Call back from [facility], they are out of network. Await response from [facility] in [place].
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
## 2021-11-09 NOTE — PLAN OF CARE
Problem: Falls - Risk of:  Goal: Will remain free from falls  Description: Will remain free from falls  11/8/2021 2239 by Gordon Decker RN  Outcome: Ongoing  Note: Patient is a fall risk during this admission. Fall risk assessment was performed. Patient is absent of falls. Bed is in the lowest position. Wheels on the bed are locked. Call light and bed side table are within reach. Clutter is removed. Patient was educated to call out when needing assistance or wanting to get out of bed. Patient offered toileting assistance during rounding. Hourly rounds have been performed. Problem: Falls - Risk of:  Goal: Absence of physical injury  Description: Absence of physical injury  11/8/2021 2239 by Gordon Decker RN  Outcome: Ongoing  11/8/2021 1616 by Michaela Mari RN  Outcome: Ongoing     Problem: Skin Integrity:  Goal: Demonstration of wound healing without infection will improve  Description: Demonstration of wound healing without infection will improve  11/8/2021 2239 by Gordon Decker RN  Outcome: Ongoing  Note: Skin integrity maintained, no new skin abnormalities assessed.  Appropriate measures remain in place      Problem: Skin Integrity:  Goal: Will show no infection signs and symptoms  Description: Will show no infection signs and symptoms  11/8/2021 2239 by Gordon Decker RN  Outcome: Ongoing     Problem: Skin Integrity:  Goal: Absence of new skin breakdown  Description: Absence of new skin breakdown  11/8/2021 2239 by Gordon Decker RN  Outcome: Ongoing

## 2021-11-09 NOTE — PLAN OF CARE
Problem: Pain:  Goal: Control of acute pain  Description: Control of acute pain  11/9/2021 1150 by Jefry Kidd RN  Outcome: Ongoing  Note: PRN Percocet for pain control Morphine for breakthrough pain .  Assess pain level every hour with hourly rounding,  11/8/2021 2239 by Carter Dexter RN  Outcome: Ongoing

## 2021-11-09 NOTE — CODE DOCUMENTATION
Social work: spoke to [employer] [phone] and fax [phone]. Sent referal They did not get the original referral.  Zohaib Aguiar is out of network. Will need philip, Rx and discharge order at discharge. [facility]    Social work; samuel could take pt if covid vaccinated but he is not apparently according to his chart. So new snf will be sought.  [facility]

## 2021-11-09 NOTE — PROGRESS NOTES
VASCULAR SURGERY  PROGRESS NOTE      11/9/2021 3:22 PM  Subjective:   Admit Date: 11/3/2021  PCP: MARCELO Dai CNP    Chief Complaint   Patient presents with    Wound Infection     Interval History: No complaints. Wound VAC placed. Diet: ADULT DIET; Regular; 4 carb choices (60 gm/meal); Low Fat/Low Chol/High Fiber/BRIJESH    Medications:   Scheduled Meds:   amitriptyline  75 mg Oral Nightly    apixaban  5 mg Oral BID    aspirin EC  81 mg Oral Daily    atorvastatin  10 mg Oral Nightly    docusate sodium  100 mg Oral BID    famotidine  20 mg Oral BID    ferrous sulfate  325 mg Oral BID    insulin glargine  25 Units SubCUTAneous Nightly    lactobacillus  1 tablet Oral BID    metoprolol tartrate  25 mg Oral BID    therapeutic multivitamin-minerals  1 tablet Oral Daily    tamsulosin  0.4 mg Oral Nightly    sodium chloride flush  5-40 mL IntraVENous 2 times per day    insulin lispro  0-12 Units SubCUTAneous TID WC    insulin lispro  0-6 Units SubCUTAneous Nightly    cefepime  2,000 mg IntraVENous Q12H     Continuous Infusions:   dextrose      sodium chloride 25 mL (11/08/21 5790)         Labs:   CBC:   No results for input(s): WBC, HGB, PLT in the last 72 hours. BMP:    Recent Labs     11/07/21  0602 11/08/21  0703 11/09/21  0626    139 138   K 4.2 3.9 5.0    105 103   CO2 23 23 24   BUN 14 19 23   CREATININE 0.77 0.79 0.96   GLUCOSE 110* 95 252*     Hepatic:   No results for input(s): AST, ALT, ALB, BILITOT, ALKPHOS in the last 72 hours. Troponin: Invalid input(s): TROPONIN  BNP: No results for input(s): BNP in the last 72 hours. Lipids: No results for input(s): CHOL, HDL in the last 72 hours. Invalid input(s): LDLCALCU  INR:   No results for input(s): INR in the last 72 hours.     Objective:   Vitals: BP (!) 104/52   Pulse 93   Temp 97.9 °F (36.6 °C) (Oral)   Resp 16   Ht 6' 1\" (1.854 m)   Wt 156 lb 6.4 oz (70.9 kg)   SpO2 94%   BMI 20.63 kg/m²   General appearance: alert, cooperative and no distress  Mental Status: oriented to person, place and time with normal affect  Neck: good carotid pulses, no JVD  Lungs: clear to auscultation bilaterally, normal effort  Heart: regular rate and rhythm, no murmur,  Abdomen: soft, non-tender, non-distended, bowel sounds present all four quadrants, no masses, hepatomegaly, splenomegaly or aortic enlargement  Extremities: dry eschar on anterior aspect of right BKA wound, left BKA site with dry eschar distally and open area laterally with some exposed bone adherent to the skin    Assessment:   3 61year-old noncompliant diabetic male with open wound on the left BKA site secondary to trauma with probable infected hematoma    Patient Active Problem List:     Type 2 diabetes mellitus with diabetic polyneuropathy, with long-term current use of insulin (HCC)     Neuropathic pain, leg     Diabetic polyneuropathy (HCC)     Allergic rhinitis     Tobacco dependence     Edentulous     Dysphagia     Lung nodules     Esophageal cancer (HCC)     Fracture of humerus, left, closed     Closed fracture of humerus     DM type 2 with diabetic peripheral neuropathy (HCC)     COPD without exacerbation (HCC)     HLD (hyperlipidemia)     Gastroesophageal reflux disease     Chronic pain syndrome     Marijuana use     History of colon polyps     Functional diarrhea     Sepsis due to methicillin resistant Staphylococcus aureus (MRSA) (HCC)     History of esophageal cancer     Osteomyelitis, chronic, ankle or foot (HCC)     PAD (peripheral artery disease) ([address]     Other pulmonary embolism without acute cor pulmonale (HCC)     Carotid stenosis, asymptomatic, bilateral     Lower limb amputation status     Fx humeral neck, right, closed, initial encounter     Transient hypotension     Constipation due to opioid therapy     Acute kidney injury ([address]     Complication of below knee amputation stump (HCC)     COPD exacerbation (HCC)     Hyponatremia     Pain, phantom limb (HCC)     S/P BKA (below knee amputation) bilateral (HCC)     Moderate protein malnutrition ([address]     Essential hypertension     Uncontrolled type 2 diabetes mellitus with hyperglycemia ([address]     History of pulmonary embolism - 2017     Atelectasis     Uncontrolled type 2 diabetes mellitus with foot ulcer (HCC)     Azotemia     Anemia, normocytic normochromic     Drug-induced constipation     Osteomyelitis of metatarsals of left foot (HCC)     Diabetic foot infection ([address]     Noncompliance     Type 1 diabetes mellitus with diabetic foot infection ([address]     Acute osteomyelitis of left foot (HCC)     Cellulitis of left foot     Mild malnutrition (HCC)     Hypocalcemia     Hypokalemia     Moderate malnutrition ([address]     History of below knee amputation, right ([address]     Foot ulcer with fat layer exposed, left ([address]     Chronic refractory osteomyelitis of left foot ([address]     Sepsis ([address]     Pyogenic inflammation of bone (HCC)     Cellulitis of left lower extremity     History of below knee amputation, left (HCC)     Leg ulcer, left, with fat layer exposed ([address]     S/P amputation     Tobacco abuse     Pneumonia of right lower lobe due to infectious organism     Abdominal pain     Marijuana abuse     Parapneumonic effusion     Hiatus hernia -large     Metabolic encephalopathy     Altered mental status      Plan:   1. Continue wound VAC  2. Continue cefepime  3.  Awaiting placement    Electronically signed by Natalee Cantor MD on 11/9/2021 at 3:22 PM

## 2021-11-09 NOTE — PROGRESS NOTES
[facility]  Progress Note    Phoenix Wheeler  MRN: [id number]  Date: 11/9/2021  LOS: 6     Reason for F/U :   Left BKA stump infected hematoma and concern for osteomyelitis    Impression :   1. Left BKA stump traumatic wound/infected hematoma with associated cellulitis  · MSSA and Klebsiella Aerogenes on culture  · MRSA isolated on second wound culture  2. Diabetes mellitus with associated neuropathy  3. COPD  4. Peripheral arterial disease  5. History of esophageal cancer    Recommendations:   · I will switch the antimicrobial therapy to ceftaroline to include MRSA coverage  · The plan is for IV antimicrobial therapy for at least 4 weeks. · The patient was getting a wound VAC placed at the time of my evaluation. · The plan is for discharge to a skilled nursing facility when arrangements have been made. Infection Control Recommendations:   Contact precautions    Discharge Planning:   Estimated Length of IV antimicrobials: 4 weeks  Patient will need PICC line Insertion  [facility]  Patient willneed outpatient wound care: Yes    Medical Decision making / Summary of Stay:   Phoenix Wheeler is a 61y.o.-year-old male who was initially admitted on 11/3/2021. Nav Wise has a history of esophageal cancer, COPD, peripheral arterial disease, diabetes mellitus with associated neuropathy, hyperlipidemia, hypertension, multiple bouts of osteomyelitis in the lower extremities bilaterally and has since had bilateral below the knee amputations. The patient has bilateral stump wounds with some scabbed lesions and reports that he recently had trauma to the left BKA stump when he fell out of his wheelchair. The patient does have a history of infection of his BKA stump and in August 2021 was treated for osteomyelitis of the BKA stump with a combination of cefepime and vancomycin for 6 weeks.   The patient came into the emergency room for evaluation for worsening wound of the left stump without any associated fevers chills nausea or vomiting. The patient has an open wound of the left BKA site and with what seems to be a draining hematoma. He was seen by the vascular surgery team today and there is some concern for cellulitis he was started on antimicrobial therapy. He currently is doing local wound care and I was asked to evaluate and help with antibiotic choice. Current evaluation:2021    BP (!) 104/52   Pulse 93   Temp 97.9 °F (36.6 °C) (Oral)   Resp 16   Ht 6' 1\" (1.854 m)   Wt 156 lb 6.4 oz (70.9 kg)   SpO2 94%   BMI 20.63 kg/m²     Temperature Range: Temp: 97.9 °F (36.6 °C) Temp  Av °F (36.7 °C)  Min: 97.9 °F (36.6 °C)  Max: 98.1 °F (36.7 °C)  The patient is seen and evaluated at bedside he is awake and alert in no acute distress. No subjective fevers or chills. No cough or shortness of breath. No abdominal pain nausea vomiting or diarrhea. He does report pain at the left BKA stump. Review of Systems   Constitutional: Negative. Respiratory: Negative. Cardiovascular: Negative. Gastrointestinal: Negative. Genitourinary: Negative. Musculoskeletal: Negative. Skin: Positive for wound. Allergic/Immunologic: Negative. Neurological: Negative. Physical Examination :     Physical Exam  Constitutional:       Appearance: He is well-developed. HENT:      Head: Normocephalic and atraumatic. Cardiovascular:      Rate and Rhythm: Normal rate. Heart sounds: Normal heart sounds. No friction rub. No gallop. Pulmonary:      Effort: Pulmonary effort is normal.      Breath sounds: Normal breath sounds. No wheezing. Abdominal:      General: Bowel sounds are normal.      Palpations: Abdomen is soft. There is no mass. Tenderness: There is no abdominal tenderness. Musculoskeletal:         General: Normal range of motion. Cervical back: Normal range of motion and neck supple.       Comments: Bilateral knee amputations. Lymphadenopathy:      Cervical: No cervical adenopathy. Skin:     General: Skin is warm and dry. Comments: The left BKA stump was evaluated there is some eschar medially and there is a wound laterally which is currently being packed. Neurological:      Mental Status: He is alert and oriented to person, place, and time. Laboratory data:   I have independently reviewed the followinglabs:  CBC with Differential:   No results for input(s): WBC, HGB, HCT, PLT, SEGSPCT, BANDSPCT, LYMPHOPCT, MONOPCT, EOSPCT in the last 72 hours. BMP:   Recent Labs     11/08/21  0703 11/09/21  0626    138   K 3.9 5.0    103   CO2 23 24   BUN 19 23   CREATININE 0.79 0.96     Hepatic Function Panel:   No results for input(s): PROT, LABALBU, BILIDIR, IBILI, BILITOT, ALKPHOS, ALT, AST in the last 72 hours. Lab Results   Component Value Date    PROCAL 0.13 05/25/2021    PROCAL 0.11 05/25/2021     Lab Results   Component Value Date    .7 11/04/2021    CRP 91.6 04/05/2021    .0 02/03/2021     Lab Results   Component Value Date    SEDRATE 97 (H) 11/04/2021         Lab Results   Component Value Date    DDIMER 0.39 06/29/2020    DDIMER 1.63 12/20/2017    DDIMER 0.68 12/25/2011     Lab Results   Component Value Date    FERRITIN 56 02/09/2021    FERRITIN 64 01/25/2014     No results found for: LDH  No results found for: FIBRINOGEN    Results in Past 30 Days  Result Component Current Result Ref Range Previous Result Ref Range   SARS-CoV-2, Rapid Not Detected (11/3/2021) Not Detected Not in Time Range      Lab Results   Component Value Date    COVID19 Not Detected 11/03/2021    COVID19 Not Detected 09/02/2021    COVID19 Not Detected 08/29/2021    COVID19 Not Detected 08/23/2020       No results for input(s): VANCOTROUGH in the last 72 hours.     Imaging Studies:   No new imaging    Cultures:     Culture, Blood 1 [[id number]] Collected: 11/04/21 0005   Order Status: Completed Specimen: Blood Updated: 11/09/21 0026    Specimen Description . BLOOD    Special Requests RT AC 10ML    Culture NO GROWTH 5 DAYS   Culture, Blood 1 [[id number]] Collected: 11/04/21 0005   Order Status: Completed Specimen: Blood Updated: 11/09/21 0026    Specimen Description . BLOOD    Special Requests RT HAND 7ML    Culture NO GROWTH 5 DAYS     Culture, Anaerobic and Aerobic [[id number]] (Abnormal)  Collected: 11/04/21 2145   Order Status: Completed Specimen: Abscess Updated: 11/08/21 0843    Specimen Description . ABSCESS    Special Requests NOT REPORTED    Direct Exam MANY NEUTROPHILS Abnormal      RARE GRAM POSITIVE COCCI IN CLUSTERS Abnormal     Culture METHICILLIN RESISTANT STAPHYLOCOCCUS AUREUS MODERATE GROWTH Abnormal      NO ANAEROBIC ORGANISMS ISOLATED AT 3 DAYS Abnormal    Susceptibility     Methicillin resistant staphylococcus aureus     BACTERIAL SUSCEPTIBILITY PANEL DARIAN (Preliminary)     cefoxitin screen NOT REPORTED       ciprofloxacin NOT REPORTED       clindamycin <=0.25  Resistant     erythromycin 4  Resistant     gentamicin <=0.5   Gentamicin . .. Sensitive  Sensitive     Induced Clind Resist POSITIVE  Positive     levofloxacin <=0.12  Sensitive     linezolid NOT REPORTED       moxifloxacin NOT REPORTED       nitrofurantoin NOT REPORTED       oxacillin >=4  Resistant     penicillin >=0.5  Resistant     rifampin NOT REPORTED       Synercid NOT REPORTED       tetracycline <=1  Sensitive     tigecycline NOT REPORTED       trimethoprim-sulfamethoxazole <=10  Sensitive     vancomycin 1  Sensitive                 Wound Culture [[id number]] (Abnormal)  Collected: 11/04/21 0020   Order Status: Completed Specimen: No Site Given from Skin Updated: 11/06/21 0700    Specimen Description . SKIN LT LEG STUMP    Special Requests NOT REPORTED    Direct Exam MODERATE NEUTROPHILS Abnormal      FEW GRAM NEGATIVE RODS Abnormal      FEW GRAM POSITIVE COCCI IN CLUSTERS Abnormal     Culture STAPHYLOCOCCUS AUREUS MODERATE GROWTH This isolate is methicillin susceptible. Abnormal      KLEBSIELLA AEROGENES MODERATE GROWTH Abnormal    Susceptibility     Staphylococcus aureus Klebsiella aerogenes     BACTERIAL SUSCEPTIBILITY PANEL DARIAN BACTERIAL SUSCEPTIBILITY PANEL DARIAN     amikacin   NOT REPORTED       aztreonam   <=1  Sensitive     ceFAZolin   >=64  Resistant     cefepime   NOT REPORTED       cefoxitin screen NOT REPORTED         cefTRIAXone   <=1  Sensitive     ciprofloxacin NOT REPORTED   <=0.25  Sensitive     clindamycin <=0.25  Sensitive       ertapenem   NOT REPORTED       erythromycin <=0.25  Sensitive       gentamicin <=0.5   Gentamicin . .. Sensitive  Sensitive <=1  Sensitive     Induced Clind Resist NOT REPORTED         levofloxacin 0.25  Sensitive       linezolid NOT REPORTED         meropenem   NOT REPORTED       moxifloxacin NOT REPORTED         nitrofurantoin NOT REPORTED   NOT REPORTED       oxacillin 0.5  Sensitive       penicillin >=0.5  Resistant       piperacillin-tazobactam   <=4  Sensitive     rifampin NOT REPORTED         Synercid NOT REPORTED         tetracycline <=1  Sensitive       tigecycline NOT REPORTED   NOT REPORTED       tobramycin   <=1  Sensitive     trimethoprim-sulfamethoxazole <=10  Sensitive <=20  Sensitive     vancomycin NOT REPORTED          Culture, Urine [[id number]] Collected: 11/03/21 2104   Order Status: Completed Specimen: Urine, clean catch Updated: 11/04/21 2328    Specimen Description . CLEAN CATCH URINE    Special Requests NOT REPORTED    Culture NO SIGNIFICANT GROWTH   Wound Gram stain [[id number]] Collected: 11/04/21 0015   Order Status: Canceled Specimen: No Site Given from Wound    COVID-19, Rapid [[id number]] Collected: 11/03/21 2050   Order Status: Completed Specimen: Nasopharyngeal Swab Updated: 11/03/21 2127    Specimen Description . NASOPHARYNGEAL SWAB    SARS-CoV-2, Rapid Not Detected    Comment:        Rapid NAAT:  The specimen is NEGATIVE for SARS-CoV-2, the novel coronavirus associated with   COVID-19.         The ID NOW COVID-19 assay is designed to detect the virus that causes COVID-19 in patients   with signs and symptoms of infection who are suspected of COVID-19. An individual without symptoms of COVID-19 and who is not shedding SARS-CoV-2 virus would   expect to have a negative (not detected) result in this assay. Negative results should be treated as presumptive and, if inconsistent with clinical signs   and symptoms or necessary for patient management,   should be tested with an alternative molecular assay. Negative results do not preclude   SARS-CoV-2 infection and   should not be used as the sole basis for patient management decisions.         Fact sheet for Healthcare Providers: BuildHer.es   Fact sheet for Patients: Awa.es           Methodology: Isothermal Nucleic Acid Amplification         Medications:      amitriptyline  75 mg Oral Nightly    apixaban  5 mg Oral BID    aspirin EC  81 mg Oral Daily    atorvastatin  10 mg Oral Nightly    docusate sodium  100 mg Oral BID    famotidine  20 mg Oral BID    ferrous sulfate  325 mg Oral BID    insulin glargine  25 Units SubCUTAneous Nightly    lactobacillus  1 tablet Oral BID    metoprolol tartrate  25 mg Oral BID    therapeutic multivitamin-minerals  1 tablet Oral Daily    tamsulosin  0.4 mg Oral Nightly    sodium chloride flush  5-40 mL IntraVENous 2 times per day    insulin lispro  0-12 Units SubCUTAneous TID WC    insulin lispro  0-6 Units SubCUTAneous Nightly    cefepime  2,000 mg IntraVENous Q12H           [facility]  Roc Johnson MD  Perfect Serve messaging  OFFICE: [phone]      Electronically signed by Roc Johnson MD on 11/9/2021 at 1:57 PM  Thank you for allowing us to participate in the care of this patient. Please call with questions.     This note iscreated with the assistance of a speech recognition program.  While intending to generate a document that actually reflects the content of the visit, the document can still have some errors including those of syntax andsound a like substitutions which may escape proof reading. In such instances, actual meaning can be extrapolated by contextual diversion.

## 2021-11-10 LAB
ANION GAP SERPL CALCULATED.3IONS-SCNC: 10 MMOL/L (ref 9–17)
BUN BLDV-MCNC: 24 MG/DL (ref 8–23)
BUN/CREAT BLD: 22 (ref 9–20)
CALCIUM SERPL-MCNC: 8.8 MG/DL (ref 8.6–10.4)
CHLORIDE BLD-SCNC: 101 MMOL/L (ref 98–107)
CO2: 26 MMOL/L (ref 20–31)
CREAT SERPL-MCNC: 1.1 MG/DL (ref 0.7–1.2)
CULTURE: NORMAL
CULTURE: NORMAL
GFR AFRICAN AMERICAN: >60 ML/MIN
GFR NON-AFRICAN AMERICAN: >60 ML/MIN
GFR SERPL CREATININE-BSD FRML MDRD: ABNORMAL ML/MIN/{1.73_M2}
GFR SERPL CREATININE-BSD FRML MDRD: ABNORMAL ML/MIN/{1.73_M2}
GLUCOSE BLD-MCNC: 187 MG/DL (ref 75–110)
GLUCOSE BLD-MCNC: 191 MG/DL (ref 75–110)
GLUCOSE BLD-MCNC: 211 MG/DL (ref 75–110)
GLUCOSE BLD-MCNC: 289 MG/DL (ref 75–110)
GLUCOSE BLD-MCNC: 302 MG/DL (ref 70–99)
GLUCOSE BLD-MCNC: 319 MG/DL (ref 75–110)
Lab: NORMAL
Lab: NORMAL
POTASSIUM SERPL-SCNC: 5.5 MMOL/L (ref 3.7–5.3)
SODIUM BLD-SCNC: 137 MMOL/L (ref 135–144)
SPECIMEN DESCRIPTION: NORMAL
SPECIMEN DESCRIPTION: NORMAL

## 2021-11-10 PROCEDURE — 2580000003 HC RX 258: Performed by: NURSE PRACTITIONER

## 2021-11-10 PROCEDURE — 6360000002 HC RX W HCPCS: Performed by: INTERNAL MEDICINE

## 2021-11-10 PROCEDURE — 97530 THERAPEUTIC ACTIVITIES: CPT

## 2021-11-10 PROCEDURE — 6370000000 HC RX 637 (ALT 250 FOR IP): Performed by: INTERNAL MEDICINE

## 2021-11-10 PROCEDURE — 82947 ASSAY GLUCOSE BLOOD QUANT: CPT

## 2021-11-10 PROCEDURE — 80048 BASIC METABOLIC PNL TOTAL CA: CPT

## 2021-11-10 PROCEDURE — 99232 SBSQ HOSP IP/OBS MODERATE 35: CPT | Performed by: SURGERY

## 2021-11-10 PROCEDURE — 97535 SELF CARE MNGMENT TRAINING: CPT

## 2021-11-10 PROCEDURE — 2580000003 HC RX 258: Performed by: INTERNAL MEDICINE

## 2021-11-10 PROCEDURE — 6370000000 HC RX 637 (ALT 250 FOR IP): Performed by: NURSE PRACTITIONER

## 2021-11-10 PROCEDURE — 1200000000 HC SEMI PRIVATE

## 2021-11-10 PROCEDURE — 99232 SBSQ HOSP IP/OBS MODERATE 35: CPT | Performed by: INTERNAL MEDICINE

## 2021-11-10 PROCEDURE — 36415 COLL VENOUS BLD VENIPUNCTURE: CPT

## 2021-11-10 RX ADMIN — PROBIOTIC PRODUCT - TAB 1 TABLET: TAB at 20:34

## 2021-11-10 RX ADMIN — AMITRIPTYLINE HYDROCHLORIDE 75 MG: 50 TABLET, FILM COATED ORAL at 20:34

## 2021-11-10 RX ADMIN — FERROUS SULFATE TAB EC 325 MG (65 MG FE EQUIVALENT) 325 MG: 325 (65 FE) TABLET DELAYED RESPONSE at 20:35

## 2021-11-10 RX ADMIN — SODIUM CHLORIDE, PRESERVATIVE FREE 10 ML: 5 INJECTION INTRAVENOUS at 20:43

## 2021-11-10 RX ADMIN — DOCUSATE SODIUM 100 MG: 100 CAPSULE, LIQUID FILLED ORAL at 20:35

## 2021-11-10 RX ADMIN — INSULIN LISPRO 6 UNITS: 100 INJECTION, SOLUTION INTRAVENOUS; SUBCUTANEOUS at 09:12

## 2021-11-10 RX ADMIN — SODIUM CHLORIDE, PRESERVATIVE FREE 10 ML: 5 INJECTION INTRAVENOUS at 09:12

## 2021-11-10 RX ADMIN — DOCUSATE SODIUM 100 MG: 100 CAPSULE, LIQUID FILLED ORAL at 09:10

## 2021-11-10 RX ADMIN — Medication 3 MG: at 20:35

## 2021-11-10 RX ADMIN — APIXABAN 5 MG: 5 TABLET, FILM COATED ORAL at 20:33

## 2021-11-10 RX ADMIN — INSULIN LISPRO 4 UNITS: 100 INJECTION, SOLUTION INTRAVENOUS; SUBCUTANEOUS at 20:35

## 2021-11-10 RX ADMIN — ASPIRIN 81 MG: 81 TABLET, COATED ORAL at 09:10

## 2021-11-10 RX ADMIN — METOPROLOL TARTRATE 25 MG: 25 TABLET, FILM COATED ORAL at 09:10

## 2021-11-10 RX ADMIN — ATORVASTATIN CALCIUM 10 MG: 10 TABLET, FILM COATED ORAL at 20:35

## 2021-11-10 RX ADMIN — METOPROLOL TARTRATE 25 MG: 25 TABLET, FILM COATED ORAL at 20:33

## 2021-11-10 RX ADMIN — OXYCODONE AND ACETAMINOPHEN 1 TABLET: 5; 325 TABLET ORAL at 21:29

## 2021-11-10 RX ADMIN — Medication 1000 MG: at 18:14

## 2021-11-10 RX ADMIN — CEFTAROLINE FOSAMIL 600 MG: 600 POWDER, FOR SOLUTION INTRAVENOUS at 05:31

## 2021-11-10 RX ADMIN — FERROUS SULFATE TAB EC 325 MG (65 MG FE EQUIVALENT) 325 MG: 325 (65 FE) TABLET DELAYED RESPONSE at 09:11

## 2021-11-10 RX ADMIN — INSULIN GLARGINE 25 UNITS: 100 INJECTION, SOLUTION SUBCUTANEOUS at 20:35

## 2021-11-10 RX ADMIN — INSULIN LISPRO 2 UNITS: 100 INJECTION, SOLUTION INTRAVENOUS; SUBCUTANEOUS at 17:55

## 2021-11-10 RX ADMIN — PROBIOTIC PRODUCT - TAB 1 TABLET: TAB at 09:10

## 2021-11-10 RX ADMIN — Medication 2 MG: at 12:27

## 2021-11-10 RX ADMIN — INSULIN LISPRO 2 UNITS: 100 INJECTION, SOLUTION INTRAVENOUS; SUBCUTANEOUS at 12:28

## 2021-11-10 RX ADMIN — TAMSULOSIN HYDROCHLORIDE 0.4 MG: 0.4 CAPSULE ORAL at 20:35

## 2021-11-10 RX ADMIN — FAMOTIDINE 20 MG: 20 TABLET ORAL at 20:35

## 2021-11-10 RX ADMIN — CEFTAROLINE FOSAMIL 600 MG: 600 POWDER, FOR SOLUTION INTRAVENOUS at 17:23

## 2021-11-10 RX ADMIN — OXYCODONE AND ACETAMINOPHEN 1 TABLET: 5; 325 TABLET ORAL at 03:47

## 2021-11-10 RX ADMIN — FAMOTIDINE 20 MG: 20 TABLET ORAL at 09:11

## 2021-11-10 RX ADMIN — Medication 2 MG: at 05:32

## 2021-11-10 RX ADMIN — OXYCODONE AND ACETAMINOPHEN 1 TABLET: 5; 325 TABLET ORAL at 17:23

## 2021-11-10 RX ADMIN — Medication 2 MG: at 22:39

## 2021-11-10 RX ADMIN — APIXABAN 5 MG: 5 TABLET, FILM COATED ORAL at 09:10

## 2021-11-10 ASSESSMENT — PAIN DESCRIPTION - FREQUENCY
FREQUENCY: CONTINUOUS

## 2021-11-10 ASSESSMENT — PAIN DESCRIPTION - ONSET
ONSET: ON-GOING

## 2021-11-10 ASSESSMENT — PAIN - FUNCTIONAL ASSESSMENT
PAIN_FUNCTIONAL_ASSESSMENT: PREVENTS OR INTERFERES SOME ACTIVE ACTIVITIES AND ADLS

## 2021-11-10 ASSESSMENT — PAIN SCALES - GENERAL
PAINLEVEL_OUTOF10: 8
PAINLEVEL_OUTOF10: 9
PAINLEVEL_OUTOF10: 9
PAINLEVEL_OUTOF10: 0
PAINLEVEL_OUTOF10: 9
PAINLEVEL_OUTOF10: 4
PAINLEVEL_OUTOF10: 7
PAINLEVEL_OUTOF10: 9

## 2021-11-10 ASSESSMENT — PAIN DESCRIPTION - PROGRESSION
CLINICAL_PROGRESSION: NOT CHANGED

## 2021-11-10 ASSESSMENT — ENCOUNTER SYMPTOMS
ALLERGIC/IMMUNOLOGIC NEGATIVE: 1
RESPIRATORY NEGATIVE: 1
GASTROINTESTINAL NEGATIVE: 1

## 2021-11-10 ASSESSMENT — PAIN DESCRIPTION - LOCATION
LOCATION: LEG

## 2021-11-10 ASSESSMENT — PAIN DESCRIPTION - ORIENTATION
ORIENTATION: RIGHT;LEFT

## 2021-11-10 ASSESSMENT — PAIN DESCRIPTION - DESCRIPTORS
DESCRIPTORS: PINS AND NEEDLES
DESCRIPTORS: SHARP
DESCRIPTORS: PINS AND NEEDLES
DESCRIPTORS: SHARP

## 2021-11-10 ASSESSMENT — PAIN DESCRIPTION - PAIN TYPE
TYPE: CHRONIC PAIN
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
## 2021-11-10 NOTE — PROGRESS NOTES
VASCULAR SURGERY  PROGRESS NOTE      11/10/2021 3:38 PM  Subjective:   Admit Date: 11/3/2021  PCP: MARCELO Gifford CNP    Chief Complaint   Patient presents with    Wound Infection     Interval History: No complaints. Wound VAC in place. Diet: ADULT DIET; Regular; 4 carb choices (60 gm/meal)    Medications:   Scheduled Meds:   ceftaroline fosamil (TEFLARO) IVPB  600 mg IntraVENous Q12H    amitriptyline  75 mg Oral Nightly    apixaban  5 mg Oral BID    aspirin EC  81 mg Oral Daily    atorvastatin  10 mg Oral Nightly    docusate sodium  100 mg Oral BID    famotidine  20 mg Oral BID    ferrous sulfate  325 mg Oral BID    insulin glargine  25 Units SubCUTAneous Nightly    lactobacillus  1 tablet Oral BID    metoprolol tartrate  25 mg Oral BID    therapeutic multivitamin-minerals  1 tablet Oral Daily    tamsulosin  0.4 mg Oral Nightly    sodium chloride flush  5-40 mL IntraVENous 2 times per day    insulin lispro  0-12 Units SubCUTAneous TID WC    insulin lispro  0-6 Units SubCUTAneous Nightly     Continuous Infusions:   dextrose      sodium chloride 25 mL (11/08/21 9177)         Labs:   CBC:   No results for input(s): WBC, HGB, PLT in the last 72 hours. BMP:    Recent Labs     11/08/21  0703 11/09/21  0626 11/10/21  0552    138 137   K 3.9 5.0 5.5*    103 101   CO2 23 24 26   BUN 19 23 24*   CREATININE 0.79 0.96 1.10   GLUCOSE 95 252* 302*     Hepatic:   No results for input(s): AST, ALT, ALB, BILITOT, ALKPHOS in the last 72 hours. Troponin: Invalid input(s): TROPONIN  BNP: No results for input(s): BNP in the last 72 hours. Lipids: No results for input(s): CHOL, HDL in the last 72 hours. Invalid input(s): LDLCALCU  INR:   No results for input(s): INR in the last 72 hours.     Objective:   Vitals: /62   Pulse 90   Temp 97.8 °F (36.6 °C) (Oral)   Resp 14   Ht 6' 1\" (1.854 m)   Wt 156 lb 6.4 oz (70.9 kg)   SpO2 99%   BMI 20.63 kg/m²   General appearance: alert, cooperative and no distress  Mental Status: oriented to person, place and time with normal affect  Neck: good carotid pulses, no JVD  Lungs: clear to auscultation bilaterally, normal effort  Heart: regular rate and rhythm, no murmur,  Abdomen: soft, non-tender, non-distended, bowel sounds present all four quadrants, no masses, hepatomegaly, splenomegaly or aortic enlargement  Extremities: dry eschar on anterior aspect of right BKA wound, left BKA site with dry eschar distally and open area laterally with some exposed bone adherent to the skin    Assessment:   3 61year-old noncompliant diabetic male with open wound on the left BKA site secondary to trauma with probable infected hematoma    Patient Active Problem List:     Type 2 diabetes mellitus with diabetic polyneuropathy, with long-term current use of insulin (HCC)     Neuropathic pain, leg     Diabetic polyneuropathy (HCC)     Allergic rhinitis     Tobacco dependence     Edentulous     Dysphagia     Lung nodules     Esophageal cancer (HCC)     Fracture of humerus, left, closed     Closed fracture of humerus     DM type 2 with diabetic peripheral neuropathy (HCC)     COPD without exacerbation (HCC)     HLD (hyperlipidemia)     Gastroesophageal reflux disease     Chronic pain syndrome     Marijuana use     History of colon polyps     Functional diarrhea     Sepsis due to methicillin resistant Staphylococcus aureus (MRSA) (HCC)     History of esophageal cancer     Osteomyelitis, chronic, ankle or foot (HCC)     PAD (peripheral artery disease) ([address]     Other pulmonary embolism without acute cor pulmonale (HCC)     Carotid stenosis, asymptomatic, bilateral     Lower limb amputation status     Fx humeral neck, right, closed, initial encounter     Transient hypotension     Constipation due to opioid therapy     Acute kidney injury ([address]     Complication of below knee amputation stump (HCC)     COPD exacerbation (HCC)     Hyponatremia     Pain, phantom limb (HCC)     S/P BKA (below knee amputation) bilateral (HCC)     Moderate protein malnutrition ([address]     Essential hypertension     Uncontrolled type 2 diabetes mellitus with hyperglycemia ([address]     History of pulmonary embolism - 2017     Atelectasis     Uncontrolled type 2 diabetes mellitus with foot ulcer (HCC)     Azotemia     Anemia, normocytic normochromic     Drug-induced constipation     Osteomyelitis of metatarsals of left foot (HCC)     Diabetic foot infection ([address]     Noncompliance     Type 1 diabetes mellitus with diabetic foot infection ([address]     Acute osteomyelitis of left foot (HCC)     Cellulitis of left foot     Mild malnutrition (HCC)     Hypocalcemia     Hypokalemia     Moderate malnutrition ([address]     History of below knee amputation, right ([address]     Foot ulcer with fat layer exposed, left ([address]     Chronic refractory osteomyelitis of left foot ([address]     Sepsis ([address]     Pyogenic inflammation of bone (HCC)     Cellulitis of left lower extremity     History of below knee amputation, left (HCC)     Leg ulcer, left, with fat layer exposed ([address]     S/P amputation     Tobacco abuse     Pneumonia of right lower lobe due to infectious organism     Abdominal pain     Marijuana abuse     Parapneumonic effusion     Hiatus hernia -large     Metabolic encephalopathy     Altered mental status      Plan:   1. Continue wound VAC  2. Continue cefepime  3.  Awaiting placement    Electronically signed by Alison Napoles MD on 11/10/2021 at 3:38 PM
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
## 2021-11-10 NOTE — PLAN OF CARE
Problem: Falls - Risk of:  Goal: Will remain free from falls  Description: Will remain free from falls  Outcome: Ongoing  Note: Siderails up x 2  Hourly rounding  Call light in reach  Instructed to call for assist before attempting out of bed. Remains free from falls and accidental injury at this time   Floor free from obstacles  Bed is locked and in lowest position  Adequate lighting provided  Bed alarm on, [place] star and Stay with Me signs posted      Goal: Absence of physical injury  Description: Absence of physical injury  Outcome: Ongoing     Problem: Pain:  Goal: Pain level will decrease  Description: Pain level will decrease  Outcome: Ongoing  Note: Pain level assessment complete.    Patient educated on pain scale and control interventions  PRN pain medication given per patient request  Patient instructed to call out with new onset of pain or unrelieved pain    Goal: Control of acute pain  Description: Control of acute pain  Outcome: Ongoing  Goal: Control of chronic pain  Description: Control of chronic pain  Outcome: Ongoing     Problem: Skin Integrity:  Goal: Demonstration of wound healing without infection will improve  Description: Demonstration of wound healing without infection will improve  Outcome: Ongoing  Goal: Will show no infection signs and symptoms  Description: Will show no infection signs and symptoms  Outcome: Ongoing  Goal: Absence of new skin breakdown  Description: Absence of new skin breakdown  Outcome: Ongoing

## 2021-11-10 NOTE — PROGRESS NOTES
[facility]  Progress Note    Liana Centeno  MRN: [id number]  Date: 11/10/2021  LOS: 7     Reason for F/U :   Left BKA stump infected hematoma and concern for osteomyelitis    Impression :   1. Left BKA stump traumatic wound/infected hematoma with associated cellulitis  · MSSA and Klebsiella Aerogenes on culture  · MRSA isolated on second wound culture  2. Diabetes mellitus with associated neuropathy  3. COPD  4. Peripheral arterial disease  5. History of esophageal cancer    Recommendations:   · Continue intravenous antimicrobial therapy with ceftaroline for 4 weeks. · The patient has a wound VAC in place. · It is my understanding that there has been some discussion about discharging the patient to his daughter's house in [place]. · This will obviously call some concerns/issues in terms of trying to find a physician who will take over the care for the IV antibiotics and the wound VAC. · We will follow his progress    Infection Control Recommendations:   Contact precautions    Discharge Planning:   Estimated Length of IV antimicrobials: 4 weeks  Patient will need PICC line Insertion  Patient will need: Home IV , [facility],  [facility]  Patient willneed outpatient wound care: Yes    Medical Decision making / Summary of Stay:   Liana Centeno is a 61y.o.-year-old male who was initially admitted on 11/3/2021. Jaclyn Irwin has a history of esophageal cancer, COPD, peripheral arterial disease, diabetes mellitus with associated neuropathy, hyperlipidemia, hypertension, multiple bouts of osteomyelitis in the lower extremities bilaterally and has since had bilateral below the knee amputations. The patient has bilateral stump wounds with some scabbed lesions and reports that he recently had trauma to the left BKA stump when he fell out of his wheelchair.   The patient does have a history of infection of his BKA stump and in August 2021 was treated for osteomyelitis of the BKA stump with a combination of cefepime and vancomycin for 6 weeks. The patient came into the emergency room for evaluation for worsening wound of the left stump without any associated fevers chills nausea or vomiting. The patient has an open wound of the left BKA site and with what seems to be a draining hematoma. He was seen by the vascular surgery team today and there is some concern for cellulitis he was started on antimicrobial therapy. He currently is doing local wound care and I was asked to evaluate and help with antibiotic choice. Current evaluation:11/10/2021    /62   Pulse 90   Temp 97.8 °F (36.6 °C) (Oral)   Resp 14   Ht 6' 1\" (1.854 m)   Wt 156 lb 6.4 oz (70.9 kg)   SpO2 99%   BMI 20.63 kg/m²     Temperature Range: Temp: 97.8 °F (36.6 °C) Temp  Av.8 °F (36.6 °C)  Min: 97.5 °F (36.4 °C)  Max: 98.1 °F (36.7 °C)  The patient is doing okay does not report any acute issues or concerns. No subjective fevers or chills. No abdominal pain nausea vomiting or diarrhea. There has been some leaking at the wound VAC site. Review of Systems   Constitutional: Negative. Respiratory: Negative. Cardiovascular: Negative. Gastrointestinal: Negative. Genitourinary: Negative. Musculoskeletal: Negative. Skin: Positive for wound. Allergic/Immunologic: Negative. Neurological: Negative. Physical Examination :     Physical Exam  Constitutional:       Appearance: He is well-developed. HENT:      Head: Normocephalic and atraumatic. Cardiovascular:      Rate and Rhythm: Normal rate. Heart sounds: Normal heart sounds. No friction rub. No gallop. Pulmonary:      Effort: Pulmonary effort is normal.      Breath sounds: Normal breath sounds. No wheezing. Abdominal:      General: Bowel sounds are normal.      Palpations: Abdomen is soft. There is no mass. Tenderness: There is no abdominal tenderness. Musculoskeletal:         General: Normal range of motion.       Cervical back: Normal range of motion and neck supple. Comments: Bilateral knee amputations. Lymphadenopathy:      Cervical: No cervical adenopathy. Skin:     General: Skin is warm and dry. Comments: The left BKA stump with a wound VAC dressing in place   Neurological:      Mental Status: He is alert and oriented to person, place, and time. Laboratory data:   I have independently reviewed the followinglabs:  CBC with Differential:   No results for input(s): WBC, HGB, HCT, PLT, SEGSPCT, BANDSPCT, LYMPHOPCT, MONOPCT, EOSPCT in the last 72 hours. BMP:   Recent Labs     11/09/21  0626 11/10/21  0552    137   K 5.0 5.5*    101   CO2 24 26   BUN 23 24*   CREATININE 0.96 1.10     Hepatic Function Panel:   No results for input(s): PROT, LABALBU, BILIDIR, IBILI, BILITOT, ALKPHOS, ALT, AST in the last 72 hours. Lab Results   Component Value Date    PROCAL 0.13 05/25/2021    PROCAL 0.11 05/25/2021     Lab Results   Component Value Date    .7 11/04/2021    CRP 91.6 04/05/2021    .0 02/03/2021     Lab Results   Component Value Date    SEDRATE 97 (H) 11/04/2021         Lab Results   Component Value Date    DDIMER 0.39 06/29/2020    DDIMER 1.63 12/20/2017    DDIMER 0.68 12/25/2011     Lab Results   Component Value Date    FERRITIN 56 02/09/2021    FERRITIN 64 01/25/2014     No results found for: LDH  No results found for: FIBRINOGEN    Results in Past 30 Days  Result Component Current Result Ref Range Previous Result Ref Range   SARS-CoV-2, Rapid Not Detected (11/3/2021) Not Detected Not in Time Range      Lab Results   Component Value Date    COVID19 Not Detected 11/03/2021    COVID19 Not Detected 09/02/2021    COVID19 Not Detected 08/29/2021    COVID19 Not Detected 08/23/2020       No results for input(s): VANCOTROUGH in the last 72 hours.     Imaging Studies:   No new imaging    Cultures:     Culture, Blood 1 [[id number]] Collected: 11/04/21 0005   Order Status: Completed Specimen: Blood Updated: 11/09/21 0026    Specimen Description . BLOOD    Special Requests RT AC 10ML    Culture NO GROWTH 5 DAYS   Culture, Blood 1 [[id number]] Collected: 11/04/21 0005   Order Status: Completed Specimen: Blood Updated: 11/09/21 0026    Specimen Description . BLOOD    Special Requests RT HAND 7ML    Culture NO GROWTH 5 DAYS     Culture, Anaerobic and Aerobic [[id number]] (Abnormal)  Collected: 11/04/21 2145   Order Status: Completed Specimen: Abscess Updated: 11/08/21 0843    Specimen Description . ABSCESS    Special Requests NOT REPORTED    Direct Exam MANY NEUTROPHILS Abnormal      RARE GRAM POSITIVE COCCI IN CLUSTERS Abnormal     Culture METHICILLIN RESISTANT STAPHYLOCOCCUS AUREUS MODERATE GROWTH Abnormal      NO ANAEROBIC ORGANISMS ISOLATED AT 3 DAYS Abnormal    Susceptibility     Methicillin resistant staphylococcus aureus     BACTERIAL SUSCEPTIBILITY PANEL DARIAN (Preliminary)     cefoxitin screen NOT REPORTED       ciprofloxacin NOT REPORTED       clindamycin <=0.25  Resistant     erythromycin 4  Resistant     gentamicin <=0.5   Gentamicin . .. Sensitive  Sensitive     Induced Clind Resist POSITIVE  Positive     levofloxacin <=0.12  Sensitive     linezolid NOT REPORTED       moxifloxacin NOT REPORTED       nitrofurantoin NOT REPORTED       oxacillin >=4  Resistant     penicillin >=0.5  Resistant     rifampin NOT REPORTED       Synercid NOT REPORTED       tetracycline <=1  Sensitive     tigecycline NOT REPORTED       trimethoprim-sulfamethoxazole <=10  Sensitive     vancomycin 1  Sensitive                 Wound Culture [[id number]] (Abnormal)  Collected: 11/04/21 0020   Order Status: Completed Specimen: No Site Given from Skin Updated: 11/06/21 0700    Specimen Description . SKIN LT LEG STUMP    Special Requests NOT REPORTED    Direct Exam MODERATE NEUTROPHILS Abnormal      FEW GRAM NEGATIVE RODS Abnormal      FEW GRAM POSITIVE COCCI IN CLUSTERS Abnormal     Culture STAPHYLOCOCCUS AUREUS MODERATE GROWTH This isolate is methicillin susceptible. Abnormal      KLEBSIELLA AEROGENES MODERATE GROWTH Abnormal    Susceptibility     Staphylococcus aureus Klebsiella aerogenes     BACTERIAL SUSCEPTIBILITY PANEL DARIAN BACTERIAL SUSCEPTIBILITY PANEL DARIAN     amikacin   NOT REPORTED       aztreonam   <=1  Sensitive     ceFAZolin   >=64  Resistant     cefepime   NOT REPORTED       cefoxitin screen NOT REPORTED         cefTRIAXone   <=1  Sensitive     ciprofloxacin NOT REPORTED   <=0.25  Sensitive     clindamycin <=0.25  Sensitive       ertapenem   NOT REPORTED       erythromycin <=0.25  Sensitive       gentamicin <=0.5   Gentamicin . .. Sensitive  Sensitive <=1  Sensitive     Induced Clind Resist NOT REPORTED         levofloxacin 0.25  Sensitive       linezolid NOT REPORTED         meropenem   NOT REPORTED       moxifloxacin NOT REPORTED         nitrofurantoin NOT REPORTED   NOT REPORTED       oxacillin 0.5  Sensitive       penicillin >=0.5  Resistant       piperacillin-tazobactam   <=4  Sensitive     rifampin NOT REPORTED         Synercid NOT REPORTED         tetracycline <=1  Sensitive       tigecycline NOT REPORTED   NOT REPORTED       tobramycin   <=1  Sensitive     trimethoprim-sulfamethoxazole <=10  Sensitive <=20  Sensitive     vancomycin NOT REPORTED          Culture, Urine [[id number]] Collected: 11/03/21 2104   Order Status: Completed Specimen: Urine, clean catch Updated: 11/04/21 2328    Specimen Description . CLEAN CATCH URINE    Special Requests NOT REPORTED    Culture NO SIGNIFICANT GROWTH   Wound Gram stain [[id number]] Collected: 11/04/21 0015   Order Status: Canceled Specimen: No Site Given from Wound    COVID-19, Rapid [[id number]] Collected: 11/03/21 2050   Order Status: Completed Specimen: Nasopharyngeal Swab Updated: 11/03/21 2127    Specimen Description . NASOPHARYNGEAL SWAB    SARS-CoV-2, Rapid Not Detected    Comment:        Rapid NAAT:  The specimen is NEGATIVE for SARS-CoV-2, the novel coronavirus associated with COVID-19.         The ID NOW COVID-19 assay is designed to detect the virus that causes COVID-19 in patients   with signs and symptoms of infection who are suspected of COVID-19. An individual without symptoms of COVID-19 and who is not shedding SARS-CoV-2 virus would   expect to have a negative (not detected) result in this assay. Negative results should be treated as presumptive and, if inconsistent with clinical signs   and symptoms or necessary for patient management,   should be tested with an alternative molecular assay. Negative results do not preclude   SARS-CoV-2 infection and   should not be used as the sole basis for patient management decisions.         Fact sheet for Healthcare Providers: BuildHer.es   Fact sheet for Patients: Awa.es           Methodology: Isothermal Nucleic Acid Amplification         Medications:      ceftaroline fosamil (TEFLARO) IVPB  600 mg IntraVENous Q12H    amitriptyline  75 mg Oral Nightly    apixaban  5 mg Oral BID    aspirin EC  81 mg Oral Daily    atorvastatin  10 mg Oral Nightly    docusate sodium  100 mg Oral BID    famotidine  20 mg Oral BID    ferrous sulfate  325 mg Oral BID    insulin glargine  25 Units SubCUTAneous Nightly    lactobacillus  1 tablet Oral BID    metoprolol tartrate  25 mg Oral BID    therapeutic multivitamin-minerals  1 tablet Oral Daily    tamsulosin  0.4 mg Oral Nightly    sodium chloride flush  5-40 mL IntraVENous 2 times per day    insulin lispro  0-12 Units SubCUTAneous TID WC    insulin lispro  0-6 Units SubCUTAneous Nightly           [facility]  Lindsay Rivas MD  Perfect [employer] messaging  OFFICE: [phone]      Electronically signed by Lindsay Rivas MD on 11/10/2021 at 3:51 PM  Thank you for allowing us to participate in the care of this patient. Please call with questions.     This note iscreated with the assistance of a speech recognition program.  While intending to generate a document that actually reflects the content of the visit, the document can still have some errors including those of syntax andsound a like substitutions which may escape proof reading. In such instances, actual meaning can be extrapolated by contextual diversion.

## 2021-11-10 NOTE — CARE COORDINATION
Social work; spoke to pt and his daughter about discharge planning. Daughter did not accept that medicare enrollment  Is only once per year. She is going to call insurance contacts she knows in [place] to find out if that accurrate. Informed she and pt that [facility] may be in networ and may be able to consider this pt. Pt approved the move if needed for the 4 weeks of antibiotics and would vac needs. Then daughter is working on an apt and other care options for him. Advised that [facility] Medicare needs to be in network for the plan to work.   Nacho duboisw

## 2021-11-10 NOTE — PROGRESS NOTES
Transitions of Care Pharmacy Service   Medication Review    The patient's list of home medications has been reviewed. His PCP office prescribes in [address] His med list reflects what he should be taking at home but he reports being out of most of his medications -- the majority of his meds have not been filled since August 2021. Source(s) of information: patient, Epic, Surescripts refill report      Please feel free to call me with any questions about this encounter. Thank you.     Jyoti Jensen [facility]   Transitions  Care Pharmacy Service  Phone:  [phone]  Fax: [phone]      Electronically signed by Jyoti Jensen [facility] on 11/10/2021 at 6:13 PM           Medications Prior to Admission:   aspirin EC 81 MG EC tablet, Take 81 mg by mouth daily  apixaban (ELIQUIS) 5 MG TABS tablet, Take 1 tablet by mouth 2 times daily  insulin detemir (LEVEMIR) 100 UNIT/ML injection vial, Inject 25 units, subcutaenously daily with dinner  insulin aspart (NOVOLOG) 100 UNIT/ML injection vial, 0-6 units subcutaneous before meals  famotidine (PEPCID) 20 MG tablet, Take 1 tablet by mouth 2 times daily  amitriptyline (ELAVIL) 75 MG tablet, Take 1 tablet by mouth nightly  metFORMIN (GLUCOPHAGE) 500 MG tablet, Take 1 tablet by mouth 2 times daily (with meals)  ferrous sulfate (IRON 325) 325 (65 Fe) MG tablet, Take 1 tablet by mouth 2 times daily  metoprolol tartrate (LOPRESSOR) 25 MG tablet, Take 1 tablet by mouth 2 times daily Hold for heart rate less than 60 or systolic blood pressure less than 100  simethicone (MYLICON) 80 MG chewable tablet, Take 1 tablet by mouth every 6 hours as needed for Flatulence  hydrOXYzine (VISTARIL) 25 MG capsule, Take 1 capsule by mouth 3 times daily as needed for Anxiety  ondansetron (ZOFRAN) 4 MG tablet, Take 1 tablet by mouth every 8 hours as needed for Nausea or Vomiting  naloxegol (MOVANTIK) 25 MG TABS tablet, Take 1 tablet by mouth every morning  naloxone 4 MG/0.1ML LIQD nasal spray, 1 spray by Nasal route as needed for Opioid Reversal  budesonide-formoterol (SYMBICORT) 160-4.5 MCG/ACT AERO, Inhale 2 puffs into the lungs 2 times daily (Patient taking differently: Inhale 2 puffs into the lungs 2 times daily Pt reports he ran out of it.  Will need a refill upon d/c)  atorvastatin (LIPITOR) 10 MG tablet, Take 10 mg by mouth nightly   acetaminophen (APAP EXTRA STRENGTH) 500 MG tablet, Take 2 tablets by mouth every 6 hours as needed for Pain  mineral oil-hydrophilic petrolatum (AQUAPHOR) ointment, Apply topically as needed to testicles  docusate sodium (COLACE) 100 MG capsule, Take 1 capsule by mouth 2 times daily  Multiple Vitamins-Minerals (THERAPEUTIC MULTIVITAMIN-MINERALS) tablet, Take 1 tablet by mouth daily  aluminum & magnesium hydroxide-simethicone (MAALOX) 200-200-20 MG/5ML SUSP suspension, Take 10 mLs by mouth every 6 hours as needed for Indigestion   bisacodyl (DULCOLAX) 5 MG EC tablet, Take 1 tablet by mouth daily as needed for Constipation  tamsulosin (FLOMAX) 0.4 MG capsule, Take 1 capsule by mouth daily (Patient taking differently: Take 0.4 mg by mouth nightly )  lactobacillus (BACID) TABS, Take 1 tablet by mouth 2 times daily  albuterol sulfate HFA (VENTOLIN HFA) 108 (90 Base) MCG/ACT inhaler, Inhale 2 puffs into the lungs every 6 hours as needed for Wheezing

## 2021-11-10 NOTE — PROGRESS NOTES
Comprehensive Nutrition Assessment    Type and Reason for Visit:  RD Nutrition Re-Screen/LOS (Length of stay)    Nutrition Recommendations/Plan:   1. Continue ADULT DIET; Regular; 4 carb choices (60 gm/meal)  2. Start Trent 2x/day  3. Monitor p.o intakes, wound healing status and labs    Nutrition Assessment:  Patient assessed for length of stay. Patient admission is related to cellulitis. Patient has a wound vac present on left leg. Patient is eating well at meals but blood glucose levels are not controlled. Will start Trent 2x/day. Monitor p.o intakes, wound healing and labs. Malnutrition Assessment:  Malnutrition Status:  No malnutrition      Estimated Daily Nutrient Needs:  Energy (kcal):  3491-4693 kcal (25-27 kcal/kg); Weight Used for Energy Requirements:  Current     Protein (g):   gm of protein (1.3-1.5 gm/kg); Weight Used for Protein Requirements:  Current          Nutrition Related Findings:  No edema. Wound vac to left leg stump      Wounds:  Surgical Incision, Wound Vac       Current Nutrition Therapies:    ADULT DIET;  Regular; 4 carb choices (60 gm/meal)    Anthropometric Measures:  · Height: 6' 1\" (185.4 cm)  · Current Body Weight: 156 lb (70.8 kg)   · Admission Body Weight: 165 lb (74.8 kg)    · Ideal Body Weight: 184 lbs; % Ideal Body Weight 84.8 %   · BMI: 20.6  · Adjusted Body Weight: 174.4; Amputation   · Adjusted BMI: 23    · BMI Categories: Normal Weight (BMI 22.0 to 24.9) age over 72       Nutrition Diagnosis:   · Increased nutrient needs related to increase demand for energy/nutrients as evidenced by wounds (wound vac to left stump)      Nutrition Interventions:   Food and/or Nutrient Delivery:  Continue Current Diet, Start Oral Nutrition Supplement  Nutrition Education/Counseling:  Education not indicated   Coordination of Nutrition Care:  Continue to monitor while inpatient    Goals:  PO intakes are greater than 75% at meals       Nutrition Monitoring and Evaluation: Food/Nutrient Intake Outcomes:  Supplement Intake, Food and Nutrient Intake  Physical Signs/Symptoms Outcomes:  Biochemical Data, Fluid Status or Edema, Skin, Weight     Discharge Planning:    Continue current diet, Continue Oral Nutrition Supplement         Mariela GONZALEZ, RDN, LDN  Lead Clinical Dietitian  RD Office Phone [phone]

## 2021-11-10 NOTE — CARE COORDINATION
Social work: called [facility] and left a message asking if family wantts to take pt by car to [place] via car what type of care should be rendered while on the road or what orders should be written to go with pt for sn there or here if this all does not work out. Daughter did discuss taking pt by train. Not sure pt can board the train independently nor manage to walk while train moving. But daughter did find that there is power for wound vac on the train. Not sure that is appropriate planning for this patient but attempting to explore all possible options for isamar. Kaila Schaefer \[facility] [place] [facility]\""

## 2021-11-10 NOTE — PLAN OF CARE
Problem: Pain:  Goal: Control of acute pain  Description: Control of acute pain  11/10/2021 1127 by Reina De La Torre RN  Outcome: Ongoing  Note: PRN Percocet and Morphine for pain control  Monitor pain level hourly  Patient states medication is effective  11/10/2021 0431 by Joaquin Chacon RN  Outcome: Ongoing     Problem: Falls - Risk of:  Goal: Will remain free from falls  Description: Will remain free from falls  11/10/2021 0431 by Joaquin Chacon RN  Outcome: Ongoing  Note: Siderails up x 2  Hourly rounding  Call light in reach  Instructed to call for assist before attempting out of bed.   Remains free from falls and accidental injury at this time   Floor free from obstacles  Bed is locked and in lowest position  Adequate lighting provided  Bed alarm on, [place] star and Stay with Me signs posted

## 2021-11-10 NOTE — CARE COORDINATION
Social work: spoke to daughter who provided names and numbers of 3 snf's in [place]. [address]   Phone: [phone]  Fax: [phone]    2. Abeba Diaz at [facility]: [phone]  Fax: [phone]    3. [address] [facility]  Phone: [phone]  Fax: [phone]    Faxed to all three facilities and called and Faxed to all. [facility] is no longer in service. Left messages at the other two snf's. Still working on [address] and may try [facility] here as none of these facilities are calling back yet. [facility] is a rehab hospital and if his wounds Need any extensive care they might also be able to consider this pt. [facility]. The daughter called insurance to get the list off [facility] medicare in network skilled nursing facilities. Some may not be still in business or may have different phone numbers from the insurance web sites.   Meche camacho

## 2021-11-10 NOTE — PROGRESS NOTES
Writer spoke with Dr. Octavio Reyes informed of discharge plans awaiting placement vs moving to [address] closer to family. Writer questioned if wound vac could be removed and wet to dry dressing could be applied for travel to [address]. based on conversation per S.WJared,Celena, and patient's daughter. Dr. Octavio Reyes states patient can not be without wound vac. Writer informed Doris Lopez of update.

## 2021-11-10 NOTE — CARE COORDINATION
Social work; spoke to [facility] and saw . Note on the possible transfer of care to [place]. Dr suggests care for 4 weeks of antibiotics and wound vac then re-assess. None of the facilities faxed or called in [place] have returned the calls. Working with Yasmin Mariano now to try for [facility] rehab before snf or home. Will advise when [employer] gets auth. Tried to update daughter but her voicemail is full on her phone. Both wound care and infection physicians did not advise for a move at this time.   Artem camacho
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
## 2021-11-10 NOTE — CARE COORDINATION
Social work: spoke to daughter again today who was in the meeting and will provide names and phone numbers of facilities that take [employer] in [place]. Daughter states she plans to drive pt in her car which she estimates is 9 hour drive to her home. Asked daughter yesterday to place call to ambulance companies there and get a  Price. Today she is thinking of driving. The wound vac may be a travel issue and that warning was brought up to her along with the need for 4 weeks of antibiotics. Encouraged daughter to consider places here to get past that point when wound vac is needed then travel. However daughter wants her dad close so all the famiy can assist him there. Will ask nurses if wound vac issues can be solved for travel along with physicians for the trip driving time there will be no antibiotics. Await the call from daughter to be able to fax to those facilities to see if they can even get a precert and be able to accept this patient tor care at discharge. Will also continue to look for help here in case this does not work as pt is close to discharge. Will need philip, Rx and discharge order at discharge for snf if that is the final plan.   Oriana camacho

## 2021-11-11 LAB
ABSOLUTE EOS #: 0.29 K/UL (ref 0–0.44)
ABSOLUTE IMMATURE GRANULOCYTE: 0.08 K/UL (ref 0–0.3)
ABSOLUTE LYMPH #: 1.61 K/UL (ref 1.1–3.7)
ABSOLUTE MONO #: 0.78 K/UL (ref 0.1–1.2)
ANION GAP SERPL CALCULATED.3IONS-SCNC: 10 MMOL/L (ref 9–17)
BASOPHILS # BLD: 1 % (ref 0–2)
BASOPHILS ABSOLUTE: 0.08 K/UL (ref 0–0.2)
BUN BLDV-MCNC: 23 MG/DL (ref 8–23)
BUN/CREAT BLD: 28 (ref 9–20)
CALCIUM SERPL-MCNC: 8.8 MG/DL (ref 8.6–10.4)
CHLORIDE BLD-SCNC: 102 MMOL/L (ref 98–107)
CO2: 27 MMOL/L (ref 20–31)
CREAT SERPL-MCNC: 0.81 MG/DL (ref 0.7–1.2)
DIFFERENTIAL TYPE: ABNORMAL
EOSINOPHILS RELATIVE PERCENT: 3 % (ref 1–4)
GFR AFRICAN AMERICAN: >60 ML/MIN
GFR NON-AFRICAN AMERICAN: >60 ML/MIN
GFR SERPL CREATININE-BSD FRML MDRD: ABNORMAL ML/MIN/{1.73_M2}
GFR SERPL CREATININE-BSD FRML MDRD: ABNORMAL ML/MIN/{1.73_M2}
GLUCOSE BLD-MCNC: 136 MG/DL (ref 75–110)
GLUCOSE BLD-MCNC: 139 MG/DL (ref 70–99)
GLUCOSE BLD-MCNC: 168 MG/DL (ref 75–110)
GLUCOSE BLD-MCNC: 232 MG/DL (ref 75–110)
GLUCOSE BLD-MCNC: 318 MG/DL (ref 75–110)
HCT VFR BLD CALC: 35 % (ref 40.7–50.3)
HEMOGLOBIN: 10.3 G/DL (ref 13–17)
IMMATURE GRANULOCYTES: 1 %
LYMPHOCYTES # BLD: 15 % (ref 24–43)
MCH RBC QN AUTO: 26 PG (ref 25.2–33.5)
MCHC RBC AUTO-ENTMCNC: 29.4 G/DL (ref 28.4–34.8)
MCV RBC AUTO: 88.4 FL (ref 82.6–102.9)
MONOCYTES # BLD: 7 % (ref 3–12)
NRBC AUTOMATED: 0 PER 100 WBC
PDW BLD-RTO: 14.5 % (ref 11.8–14.4)
PLATELET # BLD: 560 K/UL (ref 138–453)
PLATELET ESTIMATE: ABNORMAL
PMV BLD AUTO: 9 FL (ref 8.1–13.5)
POTASSIUM SERPL-SCNC: 4.7 MMOL/L (ref 3.7–5.3)
RBC # BLD: 3.96 M/UL (ref 4.21–5.77)
RBC # BLD: ABNORMAL 10*6/UL
SEG NEUTROPHILS: 73 % (ref 36–65)
SEGMENTED NEUTROPHILS ABSOLUTE COUNT: 7.82 K/UL (ref 1.5–8.1)
SODIUM BLD-SCNC: 139 MMOL/L (ref 135–144)
WBC # BLD: 10.7 K/UL (ref 3.5–11.3)
WBC # BLD: ABNORMAL 10*3/UL

## 2021-11-11 PROCEDURE — 6370000000 HC RX 637 (ALT 250 FOR IP): Performed by: INTERNAL MEDICINE

## 2021-11-11 PROCEDURE — 99232 SBSQ HOSP IP/OBS MODERATE 35: CPT | Performed by: SURGERY

## 2021-11-11 PROCEDURE — 6360000002 HC RX W HCPCS: Performed by: INTERNAL MEDICINE

## 2021-11-11 PROCEDURE — 85025 COMPLETE CBC W/AUTO DIFF WBC: CPT

## 2021-11-11 PROCEDURE — 2580000003 HC RX 258: Performed by: NURSE PRACTITIONER

## 2021-11-11 PROCEDURE — 2580000003 HC RX 258: Performed by: INTERNAL MEDICINE

## 2021-11-11 PROCEDURE — 1200000000 HC SEMI PRIVATE

## 2021-11-11 PROCEDURE — 36415 COLL VENOUS BLD VENIPUNCTURE: CPT

## 2021-11-11 PROCEDURE — 99232 SBSQ HOSP IP/OBS MODERATE 35: CPT | Performed by: INTERNAL MEDICINE

## 2021-11-11 PROCEDURE — 82947 ASSAY GLUCOSE BLOOD QUANT: CPT

## 2021-11-11 PROCEDURE — 6370000000 HC RX 637 (ALT 250 FOR IP): Performed by: NURSE PRACTITIONER

## 2021-11-11 PROCEDURE — 80048 BASIC METABOLIC PNL TOTAL CA: CPT

## 2021-11-11 RX ADMIN — ATORVASTATIN CALCIUM 10 MG: 10 TABLET, FILM COATED ORAL at 20:59

## 2021-11-11 RX ADMIN — INSULIN LISPRO 4 UNITS: 100 INJECTION, SOLUTION INTRAVENOUS; SUBCUTANEOUS at 17:23

## 2021-11-11 RX ADMIN — CEFTAROLINE FOSAMIL 600 MG: 600 POWDER, FOR SOLUTION INTRAVENOUS at 05:34

## 2021-11-11 RX ADMIN — FAMOTIDINE 20 MG: 20 TABLET ORAL at 22:39

## 2021-11-11 RX ADMIN — INSULIN GLARGINE 25 UNITS: 100 INJECTION, SOLUTION SUBCUTANEOUS at 21:05

## 2021-11-11 RX ADMIN — FERROUS SULFATE TAB EC 325 MG (65 MG FE EQUIVALENT) 325 MG: 325 (65 FE) TABLET DELAYED RESPONSE at 20:59

## 2021-11-11 RX ADMIN — METOPROLOL TARTRATE 25 MG: 25 TABLET, FILM COATED ORAL at 09:20

## 2021-11-11 RX ADMIN — TAMSULOSIN HYDROCHLORIDE 0.4 MG: 0.4 CAPSULE ORAL at 21:00

## 2021-11-11 RX ADMIN — Medication 2 MG: at 19:35

## 2021-11-11 RX ADMIN — Medication 3 MG: at 20:59

## 2021-11-11 RX ADMIN — METOPROLOL TARTRATE 25 MG: 25 TABLET, FILM COATED ORAL at 20:59

## 2021-11-11 RX ADMIN — Medication 2 MG: at 23:50

## 2021-11-11 RX ADMIN — INSULIN LISPRO 2 UNITS: 100 INJECTION, SOLUTION INTRAVENOUS; SUBCUTANEOUS at 13:13

## 2021-11-11 RX ADMIN — CEFTAROLINE FOSAMIL 600 MG: 600 POWDER, FOR SOLUTION INTRAVENOUS at 17:24

## 2021-11-11 RX ADMIN — APIXABAN 5 MG: 5 TABLET, FILM COATED ORAL at 20:59

## 2021-11-11 RX ADMIN — DOCUSATE SODIUM 100 MG: 100 CAPSULE, LIQUID FILLED ORAL at 20:59

## 2021-11-11 RX ADMIN — AMITRIPTYLINE HYDROCHLORIDE 75 MG: 50 TABLET, FILM COATED ORAL at 21:00

## 2021-11-11 RX ADMIN — PROBIOTIC PRODUCT - TAB 1 TABLET: TAB at 20:59

## 2021-11-11 RX ADMIN — OXYCODONE AND ACETAMINOPHEN 1 TABLET: 5; 325 TABLET ORAL at 09:19

## 2021-11-11 RX ADMIN — APIXABAN 5 MG: 5 TABLET, FILM COATED ORAL at 09:20

## 2021-11-11 RX ADMIN — PROBIOTIC PRODUCT - TAB 1 TABLET: TAB at 09:19

## 2021-11-11 RX ADMIN — OXYCODONE AND ACETAMINOPHEN 1 TABLET: 5; 325 TABLET ORAL at 22:32

## 2021-11-11 RX ADMIN — ASPIRIN 81 MG: 81 TABLET, COATED ORAL at 09:22

## 2021-11-11 RX ADMIN — INSULIN LISPRO 4 UNITS: 100 INJECTION, SOLUTION INTRAVENOUS; SUBCUTANEOUS at 21:05

## 2021-11-11 RX ADMIN — OXYCODONE AND ACETAMINOPHEN 1 TABLET: 5; 325 TABLET ORAL at 18:42

## 2021-11-11 RX ADMIN — FERROUS SULFATE TAB EC 325 MG (65 MG FE EQUIVALENT) 325 MG: 325 (65 FE) TABLET DELAYED RESPONSE at 09:20

## 2021-11-11 RX ADMIN — Medication 1 TABLET: at 09:19

## 2021-11-11 RX ADMIN — OXYCODONE AND ACETAMINOPHEN 1 TABLET: 5; 325 TABLET ORAL at 14:39

## 2021-11-11 RX ADMIN — FAMOTIDINE 20 MG: 20 TABLET ORAL at 09:19

## 2021-11-11 RX ADMIN — Medication 2 MG: at 15:26

## 2021-11-11 RX ADMIN — SODIUM CHLORIDE, PRESERVATIVE FREE 10 ML: 5 INJECTION INTRAVENOUS at 09:23

## 2021-11-11 RX ADMIN — DOCUSATE SODIUM 100 MG: 100 CAPSULE, LIQUID FILLED ORAL at 09:19

## 2021-11-11 ASSESSMENT — PAIN SCALES - GENERAL
PAINLEVEL_OUTOF10: 8
PAINLEVEL_OUTOF10: 9
PAINLEVEL_OUTOF10: 9
PAINLEVEL_OUTOF10: 7
PAINLEVEL_OUTOF10: 7
PAINLEVEL_OUTOF10: 8
PAINLEVEL_OUTOF10: 8

## 2021-11-11 ASSESSMENT — PAIN DESCRIPTION - LOCATION
LOCATION: LEG

## 2021-11-11 ASSESSMENT — PAIN DESCRIPTION - DESCRIPTORS
DESCRIPTORS: ACHING

## 2021-11-11 ASSESSMENT — PAIN DESCRIPTION - PAIN TYPE
TYPE: CHRONIC PAIN

## 2021-11-11 ASSESSMENT — PAIN DESCRIPTION - ORIENTATION
ORIENTATION: LEFT

## 2021-11-11 ASSESSMENT — PAIN DESCRIPTION - FREQUENCY
FREQUENCY: CONTINUOUS

## 2021-11-11 ASSESSMENT — ENCOUNTER SYMPTOMS
ALLERGIC/IMMUNOLOGIC NEGATIVE: 1
GASTROINTESTINAL NEGATIVE: 1
RESPIRATORY NEGATIVE: 1

## 2021-11-11 ASSESSMENT — PAIN - FUNCTIONAL ASSESSMENT
PAIN_FUNCTIONAL_ASSESSMENT: ACTIVITIES ARE NOT PREVENTED

## 2021-11-11 NOTE — CARE COORDINATION
Social Work-Discussed with patient and daughter that [facility] will have bed for patient. They are agreeable. Daughter asked that  provide to [facility] the info on the SNF's in [place] that accept Kacy Armstrong. Provided info to [facility]. Encouraged daughter to speake with  at [facility] to begin arrangements for home care in [place] or [place].  Deidra Allen

## 2021-11-11 NOTE — PROGRESS NOTES
Physical Therapy  DATE: 2021    NAME: Beverly Tay  MRN: [id number]   : 1955    Patient not seen this date for Physical Therapy due to:      [] Cancel by RN or physician due to:    [] Hemodialysis    [] Critical Lab Value Level     [] Blood transfusion in progress    [] Acute or unstable cardiovascular status   _MAP < 55 or more than >115  _HR < 40 or > 130    [] Acute or unstable pulmonary status   -FiO2 > 60%   _RR < 5 or >40    _O2 sats < 85%    [] Strict Bedrest    [] Off Unit for surgery or procedure    [] Off Unit for testing       [] Pending imaging to R/O fracture    [x] Refusal by Patient      [] Other      [] PT being discontinued at this time. Patient independent. No further needs. [] PT being discontinued at this time as the patient has been transferred to hospice care. No further needs.       Hanna Lowers, PTA

## 2021-11-11 NOTE — PROGRESS NOTES
Physician Progress Note      PATIENT:               Mathew Schilder  CSN #:                  [id number]  :                       1955  ADMIT DATE:       11/3/2021 6:56 PM  [address] DATE:  RESPONDING  PROVIDER #:        Santhosh Ramsay MD          QUERY TEXT:    Pt admitted with left lower extremity cellulitis. Pt noted to have DM2. If   possible, please document in progress notes and discharge summary the   relationship, if any, between cellulitis and DM. The medical record reflects the following:  Risk Factors: DM2, BKA  Clinical Indicators: Left BKA stump draining, infected hematoma, BS ranging   between 108-393, worsening left stump wound, open area with necrotic eschar  Treatment: IV Cefepime, IV Vanc, Insulin, ID/Vasc Consult, Monitoring, BS   Draws    Thank you,  Jay Fuentes RN  Options provided:  -- Left lower extremity cellulitis associated with Diabetes  -- Left lower extremity cellulitis unrelated to Diabetes  -- Other - I will add my own diagnosis  -- Disagree - Not applicable / Not valid  -- Disagree - Clinically unable to determine / Unknown  -- Refer to Clinical Documentation Reviewer    PROVIDER RESPONSE TEXT:    Left lower extremity cellulitis unrelated to Diabetes. Query created by:  Thaddeus Monaco on 2021 10:16 AM      Electronically signed by:  Santhosh Ramsay MD 2021 1:26 PM

## 2021-11-11 NOTE — CARE COORDINATION
Social Work-[facility] received precert and can admit patient tomorrow. Requested 330 transport.  Clarence Paredes

## 2021-11-11 NOTE — PLAN OF CARE
Problem: Falls - Risk of:  Goal: Will remain free from falls  Description: Will remain free from falls  Outcome: Ongoing  Note: Siderails up x 2  Hourly rounding  Call light in reach  Instructed to call for assist before attempting out of bed. Remains free from falls and accidental injury at this time   Floor free from obstacles  Bed is locked and in lowest position  Adequate lighting provided  Bed alarm on, [place] star and Stay with Me signs posted      Goal: Absence of physical injury  Description: Absence of physical injury  Outcome: Ongoing     Problem: Pain:  Goal: Pain level will decrease  Description: Pain level will decrease  Outcome: Ongoing  Note: Pain level assessment complete.    Patient educated on pain scale and control interventions  PRN pain medication given per patient request  Patient instructed to call out with new onset of pain or unrelieved pain    Goal: Control of acute pain  Description: Control of acute pain  Outcome: Ongoing  Goal: Control of chronic pain  Description: Control of chronic pain  Outcome: Ongoing     Problem: Skin Integrity:  Goal: Demonstration of wound healing without infection will improve  Description: Demonstration of wound healing without infection will improve  Outcome: Ongoing  Goal: Will show no infection signs and symptoms  Description: Will show no infection signs and symptoms  Outcome: Ongoing  Goal: Absence of new skin breakdown  Description: Absence of new skin breakdown  Outcome: Ongoing     Problem: Nutrition  Goal: Optimal nutrition therapy  Outcome: Ongoing
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
## 2021-11-11 NOTE — PROGRESS NOTES
[facility]  Progress Note    Russell Everett  MRN: [id number]  Date: 11/11/2021  LOS: 8     Reason for F/U :   Left BKA stump infected hematoma and concern for osteomyelitis    Impression :   1. Left BKA stump traumatic wound/infected hematoma with associated cellulitis  · MSSA and Klebsiella Aerogenes on culture  · MRSA isolated on second wound culture  2. Diabetes mellitus with associated neuropathy  3. COPD  4. Peripheral arterial disease  5. History of esophageal cancer    Recommendations:   · Continue intravenous antimicrobial therapy with ceftaroline December 8th 2021. · The patient has a wound VAC in place. · The plan now is for the patient to be discharged to [facility] for acute rehabilitation. · The patient is agreeable to this and the daughter was also on the phone during the discussion for this. Infection Control Recommendations:   Contact precautions    Discharge Planning:   Estimated Length of IV antimicrobials: 4 weeks  Patient will need PICC line Insertion  [facility] , [facility] [place]  [facility]  Patient willneed outpatient wound care: Yes    Medical Decision making / Summary of Stay:   Russell Everett is a 61y.o.-year-old male who was initially admitted on 11/3/2021. Sheridan Taylor has a history of esophageal cancer, COPD, peripheral arterial disease, diabetes mellitus with associated neuropathy, hyperlipidemia, hypertension, multiple bouts of osteomyelitis in the lower extremities bilaterally and has since had bilateral below the knee amputations. The patient has bilateral stump wounds with some scabbed lesions and reports that he recently had trauma to the left BKA stump when he fell out of his wheelchair. The patient does have a history of infection of his BKA stump and in August 2021 was treated for osteomyelitis of the BKA stump with a combination of cefepime and vancomycin for 6 weeks.   The patient came into the emergency room for evaluation for worsening wound of the left stump without any associated fevers chills nausea or vomiting. The patient has an open wound of the left BKA site and with what seems to be a draining hematoma. He was seen by the vascular surgery team today and there is some concern for cellulitis he was started on antimicrobial therapy. He currently is doing local wound care and I was asked to evaluate and help with antibiotic choice. Current evaluation:2021    BP (!) 146/69   Pulse 92   Temp 97.5 °F (36.4 °C) (Oral)   Resp 16   Ht 6' 1\" (1.854 m)   Wt 164 lb (74.4 kg)   SpO2 95%   BMI 21.64 kg/m²     Temperature Range: Temp: 97.5 °F (36.4 °C) Temp  Av.9 °F (36.6 °C)  Min: 97.5 °F (36.4 °C)  Max: 98.2 °F (36.8 °C)  The patient is seen and evaluated at bedside and is awake and alert in no acute distress. He was concerned that his wound VAC was not working and I did demonstrate that there was still suction in there. The patient does not report any subjective fevers or chills. No abdominal pain nausea vomiting or diarrhea    Review of Systems   Constitutional: Negative. Respiratory: Negative. Cardiovascular: Negative. Gastrointestinal: Negative. Genitourinary: Negative. Musculoskeletal: Negative. Skin: Positive for wound. Allergic/Immunologic: Negative. Neurological: Negative. Physical Examination :     Physical Exam  Constitutional:       Appearance: He is well-developed. HENT:      Head: Normocephalic and atraumatic. Cardiovascular:      Rate and Rhythm: Normal rate. Heart sounds: Normal heart sounds. No friction rub. No gallop. Pulmonary:      Effort: Pulmonary effort is normal.      Breath sounds: Normal breath sounds. No wheezing. Abdominal:      General: Bowel sounds are normal.      Palpations: Abdomen is soft. There is no mass. Tenderness: There is no abdominal tenderness. Musculoskeletal:         General: Normal range of motion.       Cervical back: Updated: 11/09/21 0026    Specimen Description . BLOOD    Special Requests RT AC 10ML    Culture NO GROWTH 5 DAYS   Culture, Blood 1 [[id number]] Collected: 11/04/21 0005   Order Status: Completed Specimen: Blood Updated: 11/09/21 0026    Specimen Description . BLOOD    Special Requests RT HAND 7ML    Culture NO GROWTH 5 DAYS     Culture, Anaerobic and Aerobic [[id number]] (Abnormal)  Collected: 11/04/21 2145   Order Status: Completed Specimen: Abscess Updated: 11/08/21 0843    Specimen Description . ABSCESS    Special Requests NOT REPORTED    Direct Exam MANY NEUTROPHILS Abnormal      RARE GRAM POSITIVE COCCI IN CLUSTERS Abnormal     Culture METHICILLIN RESISTANT STAPHYLOCOCCUS AUREUS MODERATE GROWTH Abnormal      NO ANAEROBIC ORGANISMS ISOLATED AT 3 DAYS Abnormal    Susceptibility     Methicillin resistant staphylococcus aureus     BACTERIAL SUSCEPTIBILITY PANEL DARIAN (Preliminary)     cefoxitin screen NOT REPORTED       ciprofloxacin NOT REPORTED       clindamycin <=0.25  Resistant     erythromycin 4  Resistant     gentamicin <=0.5   Gentamicin . .. Sensitive  Sensitive     Induced Clind Resist POSITIVE  Positive     levofloxacin <=0.12  Sensitive     linezolid NOT REPORTED       moxifloxacin NOT REPORTED       nitrofurantoin NOT REPORTED       oxacillin >=4  Resistant     penicillin >=0.5  Resistant     rifampin NOT REPORTED       Synercid NOT REPORTED       tetracycline <=1  Sensitive     tigecycline NOT REPORTED       trimethoprim-sulfamethoxazole <=10  Sensitive     vancomycin 1  Sensitive                 Wound Culture [[id number]] (Abnormal)  Collected: 11/04/21 0020   Order Status: Completed Specimen: No Site Given from Skin Updated: 11/06/21 0700    Specimen Description . SKIN LT LEG STUMP    Special Requests NOT REPORTED    Direct Exam MODERATE NEUTROPHILS Abnormal      FEW GRAM NEGATIVE RODS Abnormal      FEW GRAM POSITIVE COCCI IN CLUSTERS Abnormal     Culture STAPHYLOCOCCUS AUREUS MODERATE GROWTH This isolate is methicillin susceptible. Abnormal      KLEBSIELLA AEROGENES MODERATE GROWTH Abnormal    Susceptibility     Staphylococcus aureus Klebsiella aerogenes     BACTERIAL SUSCEPTIBILITY PANEL DARIAN BACTERIAL SUSCEPTIBILITY PANEL DARIAN     amikacin   NOT REPORTED       aztreonam   <=1  Sensitive     ceFAZolin   >=64  Resistant     cefepime   NOT REPORTED       cefoxitin screen NOT REPORTED         cefTRIAXone   <=1  Sensitive     ciprofloxacin NOT REPORTED   <=0.25  Sensitive     clindamycin <=0.25  Sensitive       ertapenem   NOT REPORTED       erythromycin <=0.25  Sensitive       gentamicin <=0.5   Gentamicin . .. Sensitive  Sensitive <=1  Sensitive     Induced Clind Resist NOT REPORTED         levofloxacin 0.25  Sensitive       linezolid NOT REPORTED         meropenem   NOT REPORTED       moxifloxacin NOT REPORTED         nitrofurantoin NOT REPORTED   NOT REPORTED       oxacillin 0.5  Sensitive       penicillin >=0.5  Resistant       piperacillin-tazobactam   <=4  Sensitive     rifampin NOT REPORTED         Synercid NOT REPORTED         tetracycline <=1  Sensitive       tigecycline NOT REPORTED   NOT REPORTED       tobramycin   <=1  Sensitive     trimethoprim-sulfamethoxazole <=10  Sensitive <=20  Sensitive     vancomycin NOT REPORTED          Culture, Urine [[id number]] Collected: 11/03/21 2104   Order Status: Completed Specimen: Urine, clean catch Updated: 11/04/21 2328    Specimen Description . CLEAN CATCH URINE    Special Requests NOT REPORTED    Culture NO SIGNIFICANT GROWTH   Wound Gram stain [[id number]] Collected: 11/04/21 0015   Order Status: Canceled Specimen: No Site Given from Wound    COVID-19, Rapid [[id number]] Collected: 11/03/21 2050   Order Status: Completed Specimen: Nasopharyngeal Swab Updated: 11/03/21 2127    Specimen Description . NASOPHARYNGEAL SWAB    SARS-CoV-2, Rapid Not Detected    Comment:        Rapid NAAT:  The specimen is NEGATIVE for SARS-CoV-2, the novel coronavirus associated with a speech recognition program.  While intending to generate a document that actually reflects the content of the visit, the document can still have some errors including those of syntax andsound a like substitutions which may escape proof reading. In such instances, actual meaning can be extrapolated by contextual diversion.

## 2021-11-11 NOTE — PROGRESS NOTES
[facility]        NAME:  Zen Mclean RECORD NUMBER:  [id number]  AGE: 61 y.o. GENDER: male  : 1955  TODAY'S DATE:  2021        Requested by social work team to offer wound care need advice for possibility of road trip to [place] (if possible otherwise). It is possible to manage wound care for the extended road trip with continued use of wound vac therapy and with teaching of troubleshooting techniques. The machine would need to be ordered and applied prior to transport and then managed by an accepting provider once arrival. It is likely not recommend for such a long road travel with an active infection requiring IV antibiotics and given the patient's extensive medical history. However, it sounds like a move may greatly influence his personal quality of life. Please reach out to me via [employer] by searching \"wound\" and choosing \"Wound Care Ostomy- [facility]\" if I may be of assistance further as [facility] nursing is not actively consulted for this case.     John Acosta MSN RN, CWS, [place] and [address]  Wound, Ostomy, and Continence Nursing  [phone]

## 2021-11-11 NOTE — PROGRESS NOTES
Occupational Therapy  DATE: 2021    NAME: Lizandro Rivera  MRN: [id number]   : 1955    Patient not seen this date for Occupational Therapy due to:      [] Cancel by RN or physician due to:    [] Hemodialysis    [] Critical Lab Value Level     [] Blood transfusion in progress    [] Acute or unstable cardiovascular status   _MAP < 55 or more than >115  _HR < 40 or > 130    [] Acute or unstable pulmonary status   -FiO2 > 60%   _RR < 5 or >40    _O2 sats < 85%    [] Strict Bedrest    [] Off Unit for surgery or procedure    [] Off Unit for testing       [] Pending imaging to R/O fracture    [x] Refusal by Patient      [] Other      []OTbeing discontinued at this time. Patient independent. No further needs. [] OT being discontinued at this time as the patient has been transferred to hospice care. No further needs. Pt. Declined treatment stating \"I'm fine, I don't need you today\".       Braydon Mckenzie, PJ

## 2021-11-11 NOTE — FLOWSHEET NOTE
11/11/21 1020   Negative Pressure Wound Therapy Knee Left; Lateral   Placement Date/Time: 11/09/21 1440   Pre-existing: Yes  Inserted by: Destinee Ahuja RN  Location: Knee  Wound Location Orientation: Left; Lateral   Wound Type Surgical   Dressing Type Black foam   Number of pieces used 2   Cycle Continuous   Target Pressure (mmHg) 125   Canister changed? Yes   Dressing Status Clean; Dry; Intact   Dressing Changed Changed/New   Drainage Amount None   Wound Assessment Pale   Odalys-wound Assessment Intact   Odor None     Patient with VAC leaking. Dressing changed at this time. Patient tolerated dressing change well. Suction successful with no air leak. No new drainage noted. Telfa dressing applied to large wound/eschar at end of stump. Both sites covered with VAC drape.

## 2021-11-11 NOTE — PROGRESS NOTES
VASCULAR SURGERY  PROGRESS NOTE      11/11/2021 5:11 PM  Subjective:   Admit Date: 11/3/2021  PCP: MARCELO Breen - CNP    Chief Complaint   Patient presents with    Wound Infection     Interval History: No complaints. Wound VAC in place. Plans for [facility] noted. Diet: ADULT DIET; Regular; 4 carb choices (60 gm/meal)  ADULT ORAL NUTRITION SUPPLEMENT; Breakfast, Dinner; Wound Healing Oral Supplement    Medications:   Scheduled Meds:   ceftaroline fosamil (TEFLARO) IVPB  600 mg IntraVENous Q12H    amitriptyline  75 mg Oral Nightly    apixaban  5 mg Oral BID    aspirin EC  81 mg Oral Daily    atorvastatin  10 mg Oral Nightly    docusate sodium  100 mg Oral BID    famotidine  20 mg Oral BID    ferrous sulfate  325 mg Oral BID    insulin glargine  25 Units SubCUTAneous Nightly    lactobacillus  1 tablet Oral BID    metoprolol tartrate  25 mg Oral BID    therapeutic multivitamin-minerals  1 tablet Oral Daily    tamsulosin  0.4 mg Oral Nightly    sodium chloride flush  5-40 mL IntraVENous 2 times per day    insulin lispro  0-12 Units SubCUTAneous TID WC    insulin lispro  0-6 Units SubCUTAneous Nightly     Continuous Infusions:   dextrose      sodium chloride 25 mL (11/08/21 2145)         Labs:   CBC:   Recent Labs     11/11/21  0831   WBC 10.7   HGB 10.3*   *     BMP:    Recent Labs     11/09/21  0626 11/10/21  0552 11/11/21  0651    137 139   K 5.0 5.5* 4.7    101 102   CO2 24 26 27   BUN 23 24* 23   CREATININE 0.96 1.10 0.81   GLUCOSE 252* 302* 139*     Hepatic:   No results for input(s): AST, ALT, ALB, BILITOT, ALKPHOS in the last 72 hours. Troponin: Invalid input(s): TROPONIN  BNP: No results for input(s): BNP in the last 72 hours. Lipids: No results for input(s): CHOL, HDL in the last 72 hours. Invalid input(s): LDLCALCU  INR:   No results for input(s): INR in the last 72 hours.     Objective:   Vitals: BP (!) 146/69   Pulse 92   Temp 97.5 °F (36.4 °C) (Oral)   Resp 16   Ht 6' 1\" (1.854 m)   Wt 164 lb (74.4 kg)   SpO2 95%   BMI 21.64 kg/m²   General appearance: alert, cooperative and no distress  Mental Status: oriented to person, place and time with normal affect  Neck: good carotid pulses, no JVD  Lungs: clear to auscultation bilaterally, normal effort  Heart: regular rate and rhythm, no murmur,  Abdomen: soft, non-tender, non-distended, bowel sounds present all four quadrants, no masses, hepatomegaly, splenomegaly or aortic enlargement  Extremities: dry eschar on anterior aspect of right BKA wound, left BKA site with dry eschar distally, wound VAC in place    Assessment:   3 61year-old noncompliant diabetic male with open wound on the left BKA site secondary to trauma with probable infected hematoma    Patient Active Problem List:     Type 2 diabetes mellitus with diabetic polyneuropathy, with long-term current use of insulin (HCC)     Neuropathic pain, leg     Diabetic polyneuropathy (HCC)     Allergic rhinitis     Tobacco dependence     Edentulous     Dysphagia     Lung nodules     Esophageal cancer (HCC)     Fracture of humerus, left, closed     Closed fracture of humerus     DM type 2 with diabetic peripheral neuropathy (HCC)     COPD without exacerbation (HCC)     HLD (hyperlipidemia)     Gastroesophageal reflux disease     Chronic pain syndrome     Marijuana use     History of colon polyps     Functional diarrhea     Sepsis due to methicillin resistant Staphylococcus aureus (MRSA) (HCC)     History of esophageal cancer     Osteomyelitis, chronic, ankle or foot (HCC)     PAD (peripheral artery disease) ([address]     Other pulmonary embolism without acute cor pulmonale (HCC)     Carotid stenosis, asymptomatic, bilateral     Lower limb amputation status     Fx humeral neck, right, closed, initial encounter     Transient hypotension     Constipation due to opioid therapy     Acute kidney injury ([address]     Complication of below knee amputation stump ([address]     COPD exacerbation ([address]     Hyponatremia     Pain, phantom limb (HCC)     S/P BKA (below knee amputation) bilateral (HCC)     Moderate protein malnutrition ([address]     Essential hypertension     Uncontrolled type 2 diabetes mellitus with hyperglycemia ([address]     History of pulmonary embolism - 2017     Atelectasis     Uncontrolled type 2 diabetes mellitus with foot ulcer (HCC)     Azotemia     Anemia, normocytic normochromic     Drug-induced constipation     Osteomyelitis of metatarsals of left foot (HCC)     Diabetic foot infection ([address]     Noncompliance     Type 1 diabetes mellitus with diabetic foot infection ([address]     Acute osteomyelitis of left foot (HCC)     Cellulitis of left foot     Mild malnutrition (HCC)     Hypocalcemia     Hypokalemia     Moderate malnutrition ([address]     History of below knee amputation, right ([address]     Foot ulcer with fat layer exposed, left ([address]     Chronic refractory osteomyelitis of left foot ([address]     Sepsis ([address]     Pyogenic inflammation of bone ([address]     Cellulitis of left lower extremity     History of below knee amputation, left (HCC)     Leg ulcer, left, with fat layer exposed ([address]     S/P amputation     Tobacco abuse     Pneumonia of right lower lobe due to infectious organism     Abdominal pain     Marijuana abuse     Parapneumonic effusion     Hiatus hernia -large     Metabolic encephalopathy     Altered mental status      Plan:   1. Continue wound VAC  2. Continue cefepime  3.  Okay for discharge from a vascular standpoint    Electronically signed by Sheldon Carmona MD on 11/11/2021 at 5:11 PM

## 2021-11-12 LAB
GLUCOSE BLD-MCNC: 208 MG/DL (ref 75–110)
GLUCOSE BLD-MCNC: 226 MG/DL (ref 75–110)
GLUCOSE BLD-MCNC: 234 MG/DL (ref 75–110)
GLUCOSE BLD-MCNC: 239 MG/DL (ref 75–110)

## 2021-11-12 PROCEDURE — 6370000000 HC RX 637 (ALT 250 FOR IP): Performed by: NURSE PRACTITIONER

## 2021-11-12 PROCEDURE — 99231 SBSQ HOSP IP/OBS SF/LOW 25: CPT | Performed by: INTERNAL MEDICINE

## 2021-11-12 PROCEDURE — 6360000002 HC RX W HCPCS: Performed by: INTERNAL MEDICINE

## 2021-11-12 PROCEDURE — 6370000000 HC RX 637 (ALT 250 FOR IP): Performed by: INTERNAL MEDICINE

## 2021-11-12 PROCEDURE — 1200000000 HC SEMI PRIVATE

## 2021-11-12 PROCEDURE — 82947 ASSAY GLUCOSE BLOOD QUANT: CPT

## 2021-11-12 PROCEDURE — 99232 SBSQ HOSP IP/OBS MODERATE 35: CPT | Performed by: SURGERY

## 2021-11-12 PROCEDURE — 2580000003 HC RX 258: Performed by: INTERNAL MEDICINE

## 2021-11-12 PROCEDURE — 99232 SBSQ HOSP IP/OBS MODERATE 35: CPT | Performed by: INTERNAL MEDICINE

## 2021-11-12 RX ORDER — OXYCODONE HYDROCHLORIDE AND ACETAMINOPHEN 5; 325 MG/1; MG/1
1 TABLET ORAL EVERY 8 HOURS PRN
Qty: 10 TABLET | Refills: 0 | Status: SHIPPED | OUTPATIENT
Start: 2021-11-12 | End: 2021-11-15

## 2021-11-12 RX ADMIN — INSULIN LISPRO 4 UNITS: 100 INJECTION, SOLUTION INTRAVENOUS; SUBCUTANEOUS at 08:24

## 2021-11-12 RX ADMIN — METOPROLOL TARTRATE 25 MG: 25 TABLET, FILM COATED ORAL at 20:19

## 2021-11-12 RX ADMIN — APIXABAN 5 MG: 5 TABLET, FILM COATED ORAL at 08:24

## 2021-11-12 RX ADMIN — DOCUSATE SODIUM 100 MG: 100 CAPSULE, LIQUID FILLED ORAL at 08:25

## 2021-11-12 RX ADMIN — ASPIRIN 81 MG: 81 TABLET, COATED ORAL at 08:25

## 2021-11-12 RX ADMIN — FERROUS SULFATE TAB EC 325 MG (65 MG FE EQUIVALENT) 325 MG: 325 (65 FE) TABLET DELAYED RESPONSE at 08:24

## 2021-11-12 RX ADMIN — Medication 2 MG: at 16:59

## 2021-11-12 RX ADMIN — FAMOTIDINE 20 MG: 20 TABLET ORAL at 20:19

## 2021-11-12 RX ADMIN — APIXABAN 5 MG: 5 TABLET, FILM COATED ORAL at 20:20

## 2021-11-12 RX ADMIN — INSULIN LISPRO 4 UNITS: 100 INJECTION, SOLUTION INTRAVENOUS; SUBCUTANEOUS at 17:12

## 2021-11-12 RX ADMIN — CEFTAROLINE FOSAMIL 600 MG: 600 POWDER, FOR SOLUTION INTRAVENOUS at 05:21

## 2021-11-12 RX ADMIN — INSULIN LISPRO 4 UNITS: 100 INJECTION, SOLUTION INTRAVENOUS; SUBCUTANEOUS at 11:26

## 2021-11-12 RX ADMIN — METOPROLOL TARTRATE 25 MG: 25 TABLET, FILM COATED ORAL at 08:25

## 2021-11-12 RX ADMIN — OXYCODONE AND ACETAMINOPHEN 1 TABLET: 5; 325 TABLET ORAL at 20:20

## 2021-11-12 RX ADMIN — Medication 2 MG: at 11:27

## 2021-11-12 RX ADMIN — PROBIOTIC PRODUCT - TAB 1 TABLET: TAB at 21:19

## 2021-11-12 RX ADMIN — OXYCODONE AND ACETAMINOPHEN 1 TABLET: 5; 325 TABLET ORAL at 08:24

## 2021-11-12 RX ADMIN — INSULIN GLARGINE 25 UNITS: 100 INJECTION, SOLUTION SUBCUTANEOUS at 21:57

## 2021-11-12 RX ADMIN — ATORVASTATIN CALCIUM 10 MG: 10 TABLET, FILM COATED ORAL at 20:19

## 2021-11-12 RX ADMIN — DOCUSATE SODIUM 100 MG: 100 CAPSULE, LIQUID FILLED ORAL at 20:19

## 2021-11-12 RX ADMIN — TAMSULOSIN HYDROCHLORIDE 0.4 MG: 0.4 CAPSULE ORAL at 20:20

## 2021-11-12 RX ADMIN — FAMOTIDINE 20 MG: 20 TABLET ORAL at 08:24

## 2021-11-12 RX ADMIN — Medication 1 TABLET: at 08:24

## 2021-11-12 RX ADMIN — FERROUS SULFATE TAB EC 325 MG (65 MG FE EQUIVALENT) 325 MG: 325 (65 FE) TABLET DELAYED RESPONSE at 20:20

## 2021-11-12 RX ADMIN — OXYCODONE AND ACETAMINOPHEN 1 TABLET: 5; 325 TABLET ORAL at 14:35

## 2021-11-12 RX ADMIN — Medication 2 MG: at 05:21

## 2021-11-12 RX ADMIN — CEFTAROLINE FOSAMIL 600 MG: 600 POWDER, FOR SOLUTION INTRAVENOUS at 16:59

## 2021-11-12 RX ADMIN — PROBIOTIC PRODUCT - TAB 1 TABLET: TAB at 08:25

## 2021-11-12 RX ADMIN — AMITRIPTYLINE HYDROCHLORIDE 75 MG: 50 TABLET, FILM COATED ORAL at 20:18

## 2021-11-12 RX ADMIN — OXYCODONE AND ACETAMINOPHEN 1 TABLET: 5; 325 TABLET ORAL at 02:30

## 2021-11-12 RX ADMIN — INSULIN LISPRO 2 UNITS: 100 INJECTION, SOLUTION INTRAVENOUS; SUBCUTANEOUS at 21:57

## 2021-11-12 RX ADMIN — Medication 2 MG: at 21:20

## 2021-11-12 RX ADMIN — HYDROXYZINE HYDROCHLORIDE 25 MG: 25 TABLET, FILM COATED ORAL at 20:19

## 2021-11-12 RX ADMIN — Medication 3 MG: at 20:20

## 2021-11-12 ASSESSMENT — PAIN DESCRIPTION - DESCRIPTORS: DESCRIPTORS: ACHING

## 2021-11-12 ASSESSMENT — PAIN - FUNCTIONAL ASSESSMENT: PAIN_FUNCTIONAL_ASSESSMENT: PREVENTS OR INTERFERES SOME ACTIVE ACTIVITIES AND ADLS

## 2021-11-12 ASSESSMENT — PAIN DESCRIPTION - ORIENTATION
ORIENTATION: LEFT
ORIENTATION: RIGHT;LEFT
ORIENTATION: LEFT

## 2021-11-12 ASSESSMENT — ENCOUNTER SYMPTOMS
GASTROINTESTINAL NEGATIVE: 1
ALLERGIC/IMMUNOLOGIC NEGATIVE: 1
RESPIRATORY NEGATIVE: 1

## 2021-11-12 ASSESSMENT — PAIN DESCRIPTION - LOCATION
LOCATION: LEG

## 2021-11-12 ASSESSMENT — PAIN DESCRIPTION - PAIN TYPE
TYPE: CHRONIC PAIN
TYPE: OTHER (COMMENT)
TYPE: CHRONIC PAIN

## 2021-11-12 ASSESSMENT — PAIN DESCRIPTION - ONSET
ONSET: ON-GOING
ONSET: ON-GOING

## 2021-11-12 ASSESSMENT — PAIN SCALES - GENERAL
PAINLEVEL_OUTOF10: 8
PAINLEVEL_OUTOF10: 6
PAINLEVEL_OUTOF10: 8

## 2021-11-12 ASSESSMENT — PAIN DESCRIPTION - FREQUENCY
FREQUENCY: CONTINUOUS
FREQUENCY: CONTINUOUS

## 2021-11-12 ASSESSMENT — PAIN DESCRIPTION - PROGRESSION: CLINICAL_PROGRESSION: NOT CHANGED
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
## 2021-11-12 NOTE — PROGRESS NOTES
[facility]  Progress Note    Christopher Tay  MRN: [id number]  Date: 11/12/2021  LOS: 9     Reason for F/U :   Left BKA stump infected hematoma and concern for osteomyelitis    Impression :   1. Left BKA stump traumatic wound/infected hematoma with associated cellulitis  · MSSA and Klebsiella Aerogenes on culture  · MRSA isolated on second wound culture  2. Diabetes mellitus with associated neuropathy  3. COPD  4. Peripheral arterial disease  5. History of esophageal cancer    Recommendations:   · Continue intravenous antimicrobial therapy with ceftaroline December 8th 2021. · The patient has a wound VAC in place. · The plan is for placement at a skilled nursing facility or acute rehab facility. Infection Control Recommendations:   Contact precautions    Discharge Planning:   Estimated Length of IV antimicrobials: 4 weeks  Patient will need PICC line Insertion  Patient will need: Home IV , [place],  [facility]  Patient willneed outpatient wound care: Yes    Medical Decision making / Summary of Stay:   Christopher Tay is a 61y.o.-year-old male who was initially admitted on 11/3/2021. Preethi Banerjee has a history of esophageal cancer, COPD, peripheral arterial disease, diabetes mellitus with associated neuropathy, hyperlipidemia, hypertension, multiple bouts of osteomyelitis in the lower extremities bilaterally and has since had bilateral below the knee amputations. The patient has bilateral stump wounds with some scabbed lesions and reports that he recently had trauma to the left BKA stump when he fell out of his wheelchair. The patient does have a history of infection of his BKA stump and in August 2021 was treated for osteomyelitis of the BKA stump with a combination of cefepime and vancomycin for 6 weeks. The patient came into the emergency room for evaluation for worsening wound of the left stump without any associated fevers chills nausea or vomiting.   The patient has an open wound of the left BKA site and with what seems to be a draining hematoma. He was seen by the vascular surgery team today and there is some concern for cellulitis he was started on antimicrobial therapy. He currently is doing local wound care and I was asked to evaluate and help with antibiotic choice. Current evaluation:2021    BP (!) 119/55   Pulse 70   Temp 98.4 °F (36.9 °C) (Oral)   Resp 16   Ht 6' 1\" (1.854 m)   Wt 164 lb 7 oz (74.6 kg)   SpO2 94%   BMI 21.69 kg/m²     Temperature Range: Temp: 98.4 °F (36.9 °C) Temp  Av.9 °F (36.6 °C)  Min: 97.3 °F (36.3 °C)  Max: 98.4 °F (36.9 °C)  The patient is seen and evaluated at bedside and is awake and alert no acute distress. No subjective fever or chills. No cough or shortness of breath. No abdominal pain nausea vomiting or diarrhea. Review of Systems   Constitutional: Negative. Respiratory: Negative. Cardiovascular: Negative. Gastrointestinal: Negative. Genitourinary: Negative. Musculoskeletal: Negative. Skin: Positive for wound. Allergic/Immunologic: Negative. Neurological: Negative. Physical Examination :     Physical Exam  Constitutional:       Appearance: He is well-developed. HENT:      Head: Normocephalic and atraumatic. Cardiovascular:      Rate and Rhythm: Normal rate. Heart sounds: Normal heart sounds. No friction rub. No gallop. Pulmonary:      Effort: Pulmonary effort is normal.      Breath sounds: Normal breath sounds. No wheezing. Abdominal:      General: Bowel sounds are normal.      Palpations: Abdomen is soft. There is no mass. Tenderness: There is no abdominal tenderness. Musculoskeletal:         General: Normal range of motion. Cervical back: Normal range of motion and neck supple. Comments: Bilateral knee amputations. Lymphadenopathy:      Cervical: No cervical adenopathy. Skin:     General: Skin is warm and dry. Comments:  The left BKA stump with a wound VAC dressing in place   Neurological:      Mental Status: He is alert and oriented to person, place, and time. Laboratory data:   I have independently reviewed the followinglabs:  CBC with Differential:   Recent Labs     11/11/21  0831   WBC 10.7   HGB 10.3*   HCT 35.0*   *   LYMPHOPCT 15*   MONOPCT 7     BMP:   Recent Labs     11/10/21  0552 11/11/21  0651    139   K 5.5* 4.7    102   CO2 26 27   BUN 24* 23   CREATININE 1.10 0.81     Hepatic Function Panel:   No results for input(s): PROT, LABALBU, BILIDIR, IBILI, BILITOT, ALKPHOS, ALT, AST in the last 72 hours. Lab Results   Component Value Date    PROCAL 0.13 05/25/2021    PROCAL 0.11 05/25/2021     Lab Results   Component Value Date    .7 11/04/2021    CRP 91.6 04/05/2021    .0 02/03/2021     Lab Results   Component Value Date    SEDRATE 97 (H) 11/04/2021         Lab Results   Component Value Date    DDIMER 0.39 06/29/2020    DDIMER 1.63 12/20/2017    DDIMER 0.68 12/25/2011     Lab Results   Component Value Date    FERRITIN 56 02/09/2021    FERRITIN 64 01/25/2014     No results found for: LDH  No results found for: FIBRINOGEN    Results in Past 30 Days  Result Component Current Result Ref Range Previous Result Ref Range   SARS-CoV-2, Rapid Not Detected (11/3/2021) Not Detected Not in Time Range      Lab Results   Component Value Date    COVID19 Not Detected 11/03/2021    COVID19 Not Detected 09/02/2021    COVID19 Not Detected 08/29/2021    COVID19 Not Detected 08/23/2020       No results for input(s): VANCOTROUGH in the last 72 hours. Imaging Studies:   No new imaging    Cultures:     Culture, Blood 1 [[id number]] Collected: 11/04/21 0005   Order Status: Completed Specimen: Blood Updated: 11/09/21 0026    Specimen Description . BLOOD    Special Requests RT AC 10ML    Culture NO GROWTH 5 DAYS   Culture, Blood 1 [[id number]] Collected: 11/04/21 0005   Order Status: Completed Specimen: Blood Updated: 11/09/21 8046 Specimen Description . BLOOD    Special Requests RT HAND 7ML    Culture NO GROWTH 5 DAYS     Culture, Anaerobic and Aerobic [[id number]] (Abnormal)  Collected: 11/04/21 2145   Order Status: Completed Specimen: Abscess Updated: 11/08/21 0843    Specimen Description . ABSCESS    Special Requests NOT REPORTED    Direct Exam MANY NEUTROPHILS Abnormal      RARE GRAM POSITIVE COCCI IN CLUSTERS Abnormal     Culture METHICILLIN RESISTANT STAPHYLOCOCCUS AUREUS MODERATE GROWTH Abnormal      NO ANAEROBIC ORGANISMS ISOLATED AT 3 DAYS Abnormal    Susceptibility     Methicillin resistant staphylococcus aureus     BACTERIAL SUSCEPTIBILITY PANEL DARIAN (Preliminary)     cefoxitin screen NOT REPORTED       ciprofloxacin NOT REPORTED       clindamycin <=0.25  Resistant     erythromycin 4  Resistant     gentamicin <=0.5   Gentamicin . .. Sensitive  Sensitive     Induced Clind Resist POSITIVE  Positive     levofloxacin <=0.12  Sensitive     linezolid NOT REPORTED       moxifloxacin NOT REPORTED       nitrofurantoin NOT REPORTED       oxacillin >=4  Resistant     penicillin >=0.5  Resistant     rifampin NOT REPORTED       Synercid NOT REPORTED       tetracycline <=1  Sensitive     tigecycline NOT REPORTED       trimethoprim-sulfamethoxazole <=10  Sensitive     vancomycin 1  Sensitive                 Wound Culture [[id number]] (Abnormal)  Collected: 11/04/21 0020   Order Status: Completed Specimen: No Site Given from Skin Updated: 11/06/21 0700    Specimen Description . SKIN LT LEG STUMP    Special Requests NOT REPORTED    Direct Exam MODERATE NEUTROPHILS Abnormal      FEW GRAM NEGATIVE RODS Abnormal      FEW GRAM POSITIVE COCCI IN CLUSTERS Abnormal     Culture STAPHYLOCOCCUS AUREUS MODERATE GROWTH This isolate is methicillin susceptible. Abnormal      KLEBSIELLA AEROGENES MODERATE GROWTH Abnormal    Susceptibility     Staphylococcus aureus Klebsiella aerogenes     BACTERIAL SUSCEPTIBILITY PANEL DARIAN BACTERIAL SUSCEPTIBILITY PANEL DARIAN amikacin   NOT REPORTED       aztreonam   <=1  Sensitive     ceFAZolin   >=64  Resistant     cefepime   NOT REPORTED       cefoxitin screen NOT REPORTED         cefTRIAXone   <=1  Sensitive     ciprofloxacin NOT REPORTED   <=0.25  Sensitive     clindamycin <=0.25  Sensitive       ertapenem   NOT REPORTED       erythromycin <=0.25  Sensitive       gentamicin <=0.5   Gentamicin . .. Sensitive  Sensitive <=1  Sensitive     Induced Clind Resist NOT REPORTED         levofloxacin 0.25  Sensitive       linezolid NOT REPORTED         meropenem   NOT REPORTED       moxifloxacin NOT REPORTED         nitrofurantoin NOT REPORTED   NOT REPORTED       oxacillin 0.5  Sensitive       penicillin >=0.5  Resistant       piperacillin-tazobactam   <=4  Sensitive     rifampin NOT REPORTED         Synercid NOT REPORTED         tetracycline <=1  Sensitive       tigecycline NOT REPORTED   NOT REPORTED       tobramycin   <=1  Sensitive     trimethoprim-sulfamethoxazole <=10  Sensitive <=20  Sensitive     vancomycin NOT REPORTED          Culture, Urine [[id number]] Collected: 11/03/21 2104   Order Status: Completed Specimen: Urine, clean catch Updated: 11/04/21 2328    Specimen Description . CLEAN CATCH URINE    Special Requests NOT REPORTED    Culture NO SIGNIFICANT GROWTH   Wound Gram stain [[id number]] Collected: 11/04/21 0015   Order Status: Canceled Specimen: No Site Given from Wound    COVID-19, Rapid [[id number]] Collected: 11/03/21 2050   Order Status: Completed Specimen: Nasopharyngeal Swab Updated: 11/03/21 2127    Specimen Description . NASOPHARYNGEAL SWAB    SARS-CoV-2, Rapid Not Detected    Comment:        Rapid NAAT:  The specimen is NEGATIVE for SARS-CoV-2, the novel coronavirus associated with   COVID-19.         The ID NOW COVID-19 assay is designed to detect the virus that causes COVID-19 in patients   with signs and symptoms of infection who are suspected of COVID-19.    An individual without symptoms of COVID-19 and who is not shedding SARS-CoV-2 virus would   expect to have a negative (not detected) result in this assay. Negative results should be treated as presumptive and, if inconsistent with clinical signs   and symptoms or necessary for patient management,   should be tested with an alternative molecular assay. Negative results do not preclude   SARS-CoV-2 infection and   should not be used as the sole basis for patient management decisions.         Fact sheet for Healthcare Providers: Lazaro   Fact sheet for Patients: Lazaro           Methodology: Isothermal Nucleic Acid Amplification         Medications:      ceftaroline fosamil (TEFLARO) IVPB  600 mg IntraVENous Q12H    amitriptyline  75 mg Oral Nightly    apixaban  5 mg Oral BID    aspirin EC  81 mg Oral Daily    atorvastatin  10 mg Oral Nightly    docusate sodium  100 mg Oral BID    famotidine  20 mg Oral BID    ferrous sulfate  325 mg Oral BID    insulin glargine  25 Units SubCUTAneous Nightly    lactobacillus  1 tablet Oral BID    metoprolol tartrate  25 mg Oral BID    therapeutic multivitamin-minerals  1 tablet Oral Daily    tamsulosin  0.4 mg Oral Nightly    sodium chloride flush  5-40 mL IntraVENous 2 times per day    insulin lispro  0-12 Units SubCUTAneous TID WC    insulin lispro  0-6 Units SubCUTAneous Nightly           [facility]  Shayne Torre MD  [employer] messaging  OFFICE: [phone]      Electronically signed by Shayne Torre MD on 11/12/2021 at 1:09 PM  Thank you for allowing us to participate in the care of this patient. Please call with questions.     This note iscreated with the assistance of a speech recognition program.  While intending to generate a document that actually reflects the content of the visit, the document can still have some errors including those of syntax andsound a like substitutions which may escape proof reading. In such instances, actual meaning can be extrapolated by contextual diversion.

## 2021-11-12 NOTE — PROGRESS NOTES
VASCULAR SURGERY  PROGRESS NOTE      11/12/2021 4:41 PM  Subjective:   Admit Date: 11/3/2021  PCP: MARCELO Recio CNP    Chief Complaint   Patient presents with    Wound Infection     Interval History: No complaints. Wound VAC in place. [facility] cannot accept patient until tomorrow. Diet: ADULT DIET; Regular; 4 carb choices (60 gm/meal)  ADULT ORAL NUTRITION SUPPLEMENT; Breakfast, Dinner; Wound Healing Oral Supplement    Medications:   Scheduled Meds:   ceftaroline fosamil (TEFLARO) IVPB  600 mg IntraVENous Q12H    amitriptyline  75 mg Oral Nightly    apixaban  5 mg Oral BID    aspirin EC  81 mg Oral Daily    atorvastatin  10 mg Oral Nightly    docusate sodium  100 mg Oral BID    famotidine  20 mg Oral BID    ferrous sulfate  325 mg Oral BID    insulin glargine  25 Units SubCUTAneous Nightly    lactobacillus  1 tablet Oral BID    metoprolol tartrate  25 mg Oral BID    therapeutic multivitamin-minerals  1 tablet Oral Daily    tamsulosin  0.4 mg Oral Nightly    sodium chloride flush  5-40 mL IntraVENous 2 times per day    insulin lispro  0-12 Units SubCUTAneous TID WC    insulin lispro  0-6 Units SubCUTAneous Nightly     Continuous Infusions:   dextrose      sodium chloride 25 mL (11/08/21 0991)         Labs:   CBC:   Recent Labs     11/11/21  0831   WBC 10.7   HGB 10.3*   *     BMP:    Recent Labs     11/10/21  0552 11/11/21  0651    139   K 5.5* 4.7    102   CO2 26 27   BUN 24* 23   CREATININE 1.10 0.81   GLUCOSE 302* 139*     Hepatic:   No results for input(s): AST, ALT, ALB, BILITOT, ALKPHOS in the last 72 hours. Troponin: Invalid input(s): TROPONIN  BNP: No results for input(s): BNP in the last 72 hours. Lipids: No results for input(s): CHOL, HDL in the last 72 hours. Invalid input(s): LDLCALCU  INR:   No results for input(s): INR in the last 72 hours.     Objective:   Vitals: BP (!) 119/55   Pulse 70   Temp 98.4 °F (36.9 °C) (Oral)   Resp 16  6' 1\" (1.854 m)   Wt 164 lb 7 oz (74.6 kg)   SpO2 94%   BMI 21.69 kg/m²   General appearance: alert, cooperative and no distress  Mental Status: oriented to person, place and time with normal affect  Neck: good carotid pulses, no JVD  Lungs: clear to auscultation bilaterally, normal effort  Heart: regular rate and rhythm, no murmur,  Abdomen: soft, non-tender, non-distended, bowel sounds present all four quadrants, no masses, hepatomegaly, splenomegaly or aortic enlargement  Extremities: dry eschar on anterior aspect of right BKA wound, left BKA site with dry eschar distally, wound VAC in place    Assessment:   3 61year-old noncompliant diabetic male with open wound on the left BKA site secondary to trauma with probable infected hematoma    Patient Active Problem List:     Type 2 diabetes mellitus with diabetic polyneuropathy, with long-term current use of insulin (HCC)     Neuropathic pain, leg     Diabetic polyneuropathy ([facility])     Allergic rhinitis     Tobacco dependence     Edentulous     Dysphagia     Lung nodules     Esophageal cancer (HCC)     Fracture of humerus, left, closed     Closed fracture of humerus     DM type 2 with diabetic peripheral neuropathy (HCC)     COPD without exacerbation (HCC)     HLD (hyperlipidemia)     Gastroesophageal reflux disease     Chronic pain syndrome     Marijuana use     History of colon polyps     Functional diarrhea     Sepsis due to methicillin resistant Staphylococcus aureus (MRSA) ([facility])     History of esophageal cancer     Osteomyelitis, chronic, ankle or foot ([facility])     PAD (peripheral artery disease) ([address]     Other pulmonary embolism without acute cor pulmonale (HCC)     Carotid stenosis, asymptomatic, bilateral     Lower limb amputation status     Fx humeral neck, right, closed, initial encounter     Transient hypotension     Constipation due to opioid therapy     Acute kidney injury ([address]     Complication of below knee amputation stump (HCC)     COPD exacerbation (HCC)     Hyponatremia     Pain, phantom limb (HCC)     S/P BKA (below knee amputation) bilateral (HCC)     Moderate protein malnutrition ([address]     Essential hypertension     Uncontrolled type 2 diabetes mellitus with hyperglycemia ([address]     History of pulmonary embolism - 2017     Atelectasis     Uncontrolled type 2 diabetes mellitus with foot ulcer (HCC)     Azotemia     Anemia, normocytic normochromic     Drug-induced constipation     Osteomyelitis of metatarsals of left foot (HCC)     Diabetic foot infection ([address]     Noncompliance     Type 1 diabetes mellitus with diabetic foot infection ([address]     Acute osteomyelitis of left foot (HCC)     Cellulitis of left foot     Mild malnutrition (HCC)     Hypocalcemia     Hypokalemia     Moderate malnutrition ([address]     History of below knee amputation, right ([address]     Foot ulcer with fat layer exposed, left ([address]     Chronic refractory osteomyelitis of left foot ([address]     Sepsis ([address]     Pyogenic inflammation of bone ([address]     Cellulitis of left lower extremity     History of below knee amputation, left (HCC)     Leg ulcer, left, with fat layer exposed ([address]     S/P amputation     Tobacco abuse     Pneumonia of right lower lobe due to infectious organism     Abdominal pain     Marijuana abuse     Parapneumonic effusion     Hiatus hernia -large     Metabolic encephalopathy     Altered mental status      Plan:   1. Continue wound VAC  2. Continue antibiotics  3. Okay for discharge from a vascular standpoint  4.  Follow-up with wound care    Electronically signed by Moses Mulligan MD on 11/12/2021 at 4:41 PM

## 2021-11-12 NOTE — CARE COORDINATION
Social Work-Discussed with patient and daughter that [facility] will admit patient tomorrow.  Klaudia Roland

## 2021-11-12 NOTE — CARE COORDINATION
Social Work-[facility] is not able to admit patient until tomorrow. Arranged [facility] to transport at [place].  Paulina Kellogg

## 2021-11-12 NOTE — PLAN OF CARE
Problem: Falls - Risk of:  Goal: Will remain free from falls  Description: Will remain free from falls  Outcome: Ongoing  Note: Siderails up x 2  Hourly rounding  Call light in reach  Instructed to call for assist before attempting out of bed. Remains free from falls and accidental injury at this time   Floor free from obstacles  Bed is locked and in lowest position  Adequate lighting provided  Patient pivots to the commode. Goal: Absence of physical injury  Description: Absence of physical injury  Outcome: Ongoing     Problem: Pain:  Goal: Pain level will decrease  Description: Pain level will decrease  Outcome: Ongoing  Note: Pain level assessment complete.    Patient educated on pain scale and control interventions  PRN pain medication given per patient request-Percocet & Morphine  Patient instructed to call out with new onset of pain or unrelieved pain    Goal: Control of acute pain  Description: Control of acute pain  Outcome: Ongoing  Goal: Control of chronic pain  Description: Control of chronic pain  Outcome: Ongoing     Problem: Skin Integrity:  Goal: Demonstration of wound healing without infection will improve  Description: Demonstration of wound healing without infection will improve  Outcome: Ongoing  Goal: Will show no infection signs and symptoms  Description: Will show no infection signs and symptoms  Outcome: Ongoing  Goal: Absence of new skin breakdown  Description: Absence of new skin breakdown  Outcome: Ongoing     Problem: Nutrition  Goal: Optimal nutrition therapy  Outcome: Ongoing

## 2021-11-13 VITALS
HEART RATE: 87 BPM | HEIGHT: 73 IN | RESPIRATION RATE: 16 BRPM | WEIGHT: 164 LBS | SYSTOLIC BLOOD PRESSURE: 136 MMHG | TEMPERATURE: 97.5 F | OXYGEN SATURATION: 96 % | DIASTOLIC BLOOD PRESSURE: 83 MMHG | BODY MASS INDEX: 21.74 KG/M2

## 2021-11-13 LAB
ABSOLUTE EOS #: 0.26 K/UL (ref 0–0.44)
ABSOLUTE IMMATURE GRANULOCYTE: 0.08 K/UL (ref 0–0.3)
ABSOLUTE LYMPH #: 1.74 K/UL (ref 1.1–3.7)
ABSOLUTE MONO #: 0.63 K/UL (ref 0.1–1.2)
ANION GAP SERPL CALCULATED.3IONS-SCNC: 10 MMOL/L (ref 9–17)
BASOPHILS # BLD: 1 % (ref 0–2)
BASOPHILS ABSOLUTE: 0.09 K/UL (ref 0–0.2)
BUN BLDV-MCNC: 25 MG/DL (ref 8–23)
BUN/CREAT BLD: 27 (ref 9–20)
CALCIUM SERPL-MCNC: 8.7 MG/DL (ref 8.6–10.4)
CHLORIDE BLD-SCNC: 103 MMOL/L (ref 98–107)
CO2: 26 MMOL/L (ref 20–31)
CREAT SERPL-MCNC: 0.91 MG/DL (ref 0.7–1.2)
DIFFERENTIAL TYPE: ABNORMAL
EOSINOPHILS RELATIVE PERCENT: 3 % (ref 1–4)
GFR AFRICAN AMERICAN: >60 ML/MIN
GFR NON-AFRICAN AMERICAN: >60 ML/MIN
GFR SERPL CREATININE-BSD FRML MDRD: ABNORMAL ML/MIN/{1.73_M2}
GFR SERPL CREATININE-BSD FRML MDRD: ABNORMAL ML/MIN/{1.73_M2}
GLUCOSE BLD-MCNC: 108 MG/DL (ref 70–99)
GLUCOSE BLD-MCNC: 110 MG/DL (ref 75–110)
GLUCOSE BLD-MCNC: 135 MG/DL (ref 75–110)
HCT VFR BLD CALC: 35.8 % (ref 40.7–50.3)
HEMOGLOBIN: 10.3 G/DL (ref 13–17)
IMMATURE GRANULOCYTES: 1 %
LYMPHOCYTES # BLD: 18 % (ref 24–43)
MCH RBC QN AUTO: 25.8 PG (ref 25.2–33.5)
MCHC RBC AUTO-ENTMCNC: 28.8 G/DL (ref 28.4–34.8)
MCV RBC AUTO: 89.5 FL (ref 82.6–102.9)
MONOCYTES # BLD: 6 % (ref 3–12)
NRBC AUTOMATED: 0 PER 100 WBC
PDW BLD-RTO: 14.6 % (ref 11.8–14.4)
PLATELET # BLD: 576 K/UL (ref 138–453)
PLATELET ESTIMATE: ABNORMAL
PMV BLD AUTO: 8.7 FL (ref 8.1–13.5)
POTASSIUM SERPL-SCNC: 4.3 MMOL/L (ref 3.7–5.3)
RBC # BLD: 4 M/UL (ref 4.21–5.77)
RBC # BLD: ABNORMAL 10*6/UL
SEG NEUTROPHILS: 71 % (ref 36–65)
SEGMENTED NEUTROPHILS ABSOLUTE COUNT: 7.06 K/UL (ref 1.5–8.1)
SODIUM BLD-SCNC: 139 MMOL/L (ref 135–144)
WBC # BLD: 9.9 K/UL (ref 3.5–11.3)
WBC # BLD: ABNORMAL 10*3/UL

## 2021-11-13 PROCEDURE — 6360000002 HC RX W HCPCS: Performed by: INTERNAL MEDICINE

## 2021-11-13 PROCEDURE — 99238 HOSP IP/OBS DSCHRG MGMT 30/<: CPT | Performed by: INTERNAL MEDICINE

## 2021-11-13 PROCEDURE — 6370000000 HC RX 637 (ALT 250 FOR IP): Performed by: INTERNAL MEDICINE

## 2021-11-13 PROCEDURE — 80048 BASIC METABOLIC PNL TOTAL CA: CPT

## 2021-11-13 PROCEDURE — 85025 COMPLETE CBC W/AUTO DIFF WBC: CPT

## 2021-11-13 PROCEDURE — 82947 ASSAY GLUCOSE BLOOD QUANT: CPT

## 2021-11-13 PROCEDURE — 99232 SBSQ HOSP IP/OBS MODERATE 35: CPT | Performed by: INTERNAL MEDICINE

## 2021-11-13 PROCEDURE — 2580000003 HC RX 258: Performed by: INTERNAL MEDICINE

## 2021-11-13 PROCEDURE — 2580000003 HC RX 258: Performed by: NURSE PRACTITIONER

## 2021-11-13 PROCEDURE — 6370000000 HC RX 637 (ALT 250 FOR IP): Performed by: NURSE PRACTITIONER

## 2021-11-13 PROCEDURE — 36415 COLL VENOUS BLD VENIPUNCTURE: CPT

## 2021-11-13 RX ADMIN — Medication 1 TABLET: at 08:58

## 2021-11-13 RX ADMIN — FAMOTIDINE 20 MG: 20 TABLET ORAL at 08:57

## 2021-11-13 RX ADMIN — Medication 2 MG: at 01:53

## 2021-11-13 RX ADMIN — APIXABAN 5 MG: 5 TABLET, FILM COATED ORAL at 08:58

## 2021-11-13 RX ADMIN — CEFTAROLINE FOSAMIL 600 MG: 600 POWDER, FOR SOLUTION INTRAVENOUS at 04:47

## 2021-11-13 RX ADMIN — OXYCODONE AND ACETAMINOPHEN 1 TABLET: 5; 325 TABLET ORAL at 04:09

## 2021-11-13 RX ADMIN — ASPIRIN 81 MG: 81 TABLET, COATED ORAL at 08:58

## 2021-11-13 RX ADMIN — Medication 2 MG: at 13:16

## 2021-11-13 RX ADMIN — FERROUS SULFATE TAB EC 325 MG (65 MG FE EQUIVALENT) 325 MG: 325 (65 FE) TABLET DELAYED RESPONSE at 08:58

## 2021-11-13 RX ADMIN — DOCUSATE SODIUM 100 MG: 100 CAPSULE, LIQUID FILLED ORAL at 08:57

## 2021-11-13 RX ADMIN — OXYCODONE AND ACETAMINOPHEN 1 TABLET: 5; 325 TABLET ORAL at 11:57

## 2021-11-13 RX ADMIN — PROBIOTIC PRODUCT - TAB 1 TABLET: TAB at 08:57

## 2021-11-13 RX ADMIN — METOPROLOL TARTRATE 25 MG: 25 TABLET, FILM COATED ORAL at 08:58

## 2021-11-13 RX ADMIN — SODIUM CHLORIDE, PRESERVATIVE FREE 10 ML: 5 INJECTION INTRAVENOUS at 09:13

## 2021-11-13 RX ADMIN — Medication 2 MG: at 09:13

## 2021-11-13 ASSESSMENT — PAIN DESCRIPTION - DESCRIPTORS
DESCRIPTORS: ACHING

## 2021-11-13 ASSESSMENT — PAIN DESCRIPTION - ORIENTATION
ORIENTATION: LEFT

## 2021-11-13 ASSESSMENT — PAIN DESCRIPTION - FREQUENCY
FREQUENCY: CONTINUOUS

## 2021-11-13 ASSESSMENT — ENCOUNTER SYMPTOMS
RESPIRATORY NEGATIVE: 1
ALLERGIC/IMMUNOLOGIC NEGATIVE: 1
GASTROINTESTINAL NEGATIVE: 1

## 2021-11-13 ASSESSMENT — PAIN SCALES - GENERAL
PAINLEVEL_OUTOF10: 8
PAINLEVEL_OUTOF10: 6
PAINLEVEL_OUTOF10: 8
PAINLEVEL_OUTOF10: 8
PAINLEVEL_OUTOF10: 6
PAINLEVEL_OUTOF10: 8
PAINLEVEL_OUTOF10: 8

## 2021-11-13 ASSESSMENT — PAIN DESCRIPTION - ONSET
ONSET: ON-GOING

## 2021-11-13 ASSESSMENT — PAIN DESCRIPTION - PAIN TYPE
TYPE: CHRONIC PAIN

## 2021-11-13 ASSESSMENT — PAIN DESCRIPTION - LOCATION
LOCATION: LEG

## 2021-11-13 ASSESSMENT — PAIN DESCRIPTION - PROGRESSION
CLINICAL_PROGRESSION: NOT CHANGED

## 2021-11-13 NOTE — PLAN OF CARE
Problem: Pain:  Goal: Pain level will decrease  Description: Pain level will decrease  11/13/2021 1044 by Toni Estes RN  Outcome: Ongoing  Note: Pain level assessment complete.    Patient educated on pain scale and control interventions  PRN pain medication given per patient request  Patient instructed to call out with new onset of pain or unrelieved pain

## 2021-11-13 NOTE — PLAN OF CARE
Problem: Falls - Risk of:  Goal: Will remain free from falls  Description: Will remain free from falls  11/13/2021 0037 by Sita Lay RN  Outcome: Ongoing  Note: Patient is a fall risk during this admission. Fall risk assessment was performed. Patient is absent of falls. Bed is in the lowest position. Wheels on the bed are locked. Call light and bed side table are within reach. Clutter is removed. Patient was educated to call out when needing assistance or wanting to get out of bed. Patient offered toileting assistance during rounding. Hourly rounds have been performed. Problem: Falls - Risk of:  Goal: Absence of physical injury  Description: Absence of physical injury  11/13/2021 0037 by Sita Lay RN  Outcome: Ongoing     Problem: Pain:  Goal: Pain level will decrease  Description: Pain level will decrease  11/13/2021 0037 by Sita Lay RN  Outcome: Ongoing  Note: Pt medicated with pain medication prn. Assessed all pain characteristics including level, type, location, frequency, and onset. Non-pharmacologic interventions offered to pt as well. Pt states pain is tolerable at this time. Will continue to monitor      Problem: Pain:  Goal: Control of acute pain  Description: Control of acute pain  11/13/2021 0037 by Sita Lay RN  Outcome: Ongoing     Problem: Pain:  Goal: Control of chronic pain  Description: Control of chronic pain  11/13/2021 0037 by Sita Lay RN  Outcome: Ongoing     Problem: Skin Integrity:  Goal: Demonstration of wound healing without infection will improve  Description: Demonstration of wound healing without infection will improve  11/13/2021 0037 by Sita Lay RN  Outcome: Ongoing  Note: Skin integrity maintained, no new skin abnormalities assessed.  Appropriate measures remain in place      Problem: Skin Integrity:  Goal: Absence of new skin breakdown  Description: Absence of new skin breakdown  11/13/2021 0037 by Osbaldo Price RN  Outcome: Ongoing

## 2021-11-13 NOTE — PLAN OF CARE
Problem: Falls - Risk of:  Goal: Will remain free from falls  Description: Will remain free from falls  11/13/2021 1044 by Lindalou Prader, RN  Outcome: Ongoing  Note: Siderails up x 2  Hourly rounding  Call light in reach  Instructed to call for assist before attempting out of bed.   Remains free from falls and accidental injury at this time   Floor free from obstacles  Bed is locked and in lowest position  Adequate lighting provided  Bed alarm on, [place] star and Stay with Me signs posted         Problem: Skin Integrity:  Goal: Absence of new skin breakdown  Description: Absence of new skin breakdown  11/13/2021 1044 by Lindalou Prader, RN  Outcome: Ongoing

## 2021-11-13 NOTE — PLAN OF CARE
Problem: Falls - Risk of:  Goal: Will remain free from falls  Description: Will remain free from falls  Outcome: Ongoing  Note: Siderails up x 2  Hourly rounding  Call light in reach  Instructed to call for assist before attempting out of bed. Remains free from falls and accidental injury at this time   Floor free from obstacles  Bed is locked and in lowest position  Adequate lighting provided  Bed alarm on, [place] star and Stay with Me signs posted  Goal: Absence of physical injury  Description: Absence of physical injury  Outcome: Ongoing     Problem: Pain:  Goal: Pain level will decrease  Description: Pain level will decrease  Outcome: Ongoing  Note: Pain level assessment complete.    Patient educated on pain scale and control interventions  PRN pain medication given per patient request-Percocet & Morphine  Patient instructed to call out with new onset of pain or unrelieved pain    Goal: Control of acute pain  Description: Control of acute pain  Outcome: Ongoing  Goal: Control of chronic pain  Description: Control of chronic pain  Outcome: Ongoing     Problem: Skin Integrity:  Goal: Demonstration of wound healing without infection will improve  Description: Demonstration of wound healing without infection will improve  Outcome: Ongoing  Goal: Will show no infection signs and symptoms  Description: Will show no infection signs and symptoms  Outcome: Ongoing  Goal: Absence of new skin breakdown  Description: Absence of new skin breakdown  Outcome: Ongoing     Problem: Nutrition  Goal: Optimal nutrition therapy  Outcome: Ongoing

## 2021-11-13 NOTE — PROGRESS NOTES
Pt discharged to [facility] in good condition with belongings  Pt converted to a wet to dry dressing, wound vac return request faxed to central supply  Patient has picc line in place for iv atb  Packet given to [facility] transport to give to facility  Pt denies having any further questions at this time  Locked up home medication(s)/personal items given to patient at discharge  Patient/family state they have everything they were admitted with.

## 2021-11-13 NOTE — PROGRESS NOTES
Writer attempted to call report to [facility]. Spoke to April who said nurse was on lunch. Phone number left for nurse to call back.

## 2021-11-13 NOTE — PROGRESS NOTES
[facility]  Progress Note    Romero Parrish  MRN: [id number]  Date: 11/13/2021  LOS: 8     Reason for F/U :   Left BKA stump infected hematoma and concern for osteomyelitis    Impression :   1. Left BKA stump traumatic wound/infected hematoma with associated cellulitis  · MSSA and Klebsiella Aerogenes on culture  · MRSA isolated on second wound culture  2. Diabetes mellitus with associated neuropathy  3. COPD  4. Peripheral arterial disease  5. History of esophageal cancer    Recommendations:   · Continue intravenous antimicrobial therapy with ceftaroline December 8th 2021. · The patient may be discharged from infectious disease point of view to acute rehab. Infection Control Recommendations:   Contact precautions    Discharge Planning:   Estimated Length of IV antimicrobials: 4 weeks  Patient will need PICC line Insertion  [facility]  Patient willneed outpatient wound care: Yes    Medical Decision making / Summary of Stay:   Romero Parrish is a 61y.o.-year-old male who was initially admitted on 11/3/2021. [facility] has a history of esophageal cancer, COPD, peripheral arterial disease, diabetes mellitus with associated neuropathy, hyperlipidemia, hypertension, multiple bouts of osteomyelitis in the lower extremities bilaterally and has since had bilateral below the knee amputations. The patient has bilateral stump wounds with some scabbed lesions and reports that he recently had trauma to the left BKA stump when he fell out of his wheelchair. The patient does have a history of infection of his BKA stump and in August 2021 was treated for osteomyelitis of the BKA stump with a combination of cefepime and vancomycin for 6 weeks. The patient came into the emergency room for evaluation for worsening wound of the left stump without any associated fevers chills nausea or vomiting.   The patient has an open wound of the left BKA site and with what seems to be a draining hematoma. He was seen by the vascular surgery team today and there is some concern for cellulitis he was started on antimicrobial therapy. He currently is doing local wound care and I was asked to evaluate and help with antibiotic choice. Current evaluation:2021    /83   Pulse 87   Temp 97.5 °F (36.4 °C)   Resp 16   Ht 6' 1\" (1.854 m)   Wt 164 lb (74.4 kg)   SpO2 96%   BMI 21.64 kg/m²     Temperature Range: Temp: 97.5 °F (36.4 °C) Temp  Av °F (36.7 °C)  Min: 97.5 °F (36.4 °C)  Max: 98.4 °F (36.9 °C)  The patient is seen and evaluated at bedside and is awake and alert in no acute distress. He is feeling well, denied significant pain, denied fever or chills, denied nausea or vomiting, no other complaints. Review of Systems   Constitutional: Negative. Respiratory: Negative. Cardiovascular: Negative. Gastrointestinal: Negative. Genitourinary: Negative. Musculoskeletal: Negative. Skin: Positive for wound. Allergic/Immunologic: Negative. Neurological: Negative. Physical Examination :     Physical Exam  Constitutional:       Appearance: He is well-developed. HENT:      Head: Normocephalic and atraumatic. Cardiovascular:      Rate and Rhythm: Normal rate. Heart sounds: Normal heart sounds. No friction rub. No gallop. Pulmonary:      Effort: Pulmonary effort is normal.      Breath sounds: Normal breath sounds. No wheezing. Abdominal:      General: Bowel sounds are normal.      Palpations: Abdomen is soft. There is no mass. Tenderness: There is no abdominal tenderness. Musculoskeletal:         General: Normal range of motion. Cervical back: Normal range of motion and neck supple. Comments: Bilateral knee amputations. Lymphadenopathy:      Cervical: No cervical adenopathy. Skin:     General: Skin is warm and dry. Comments:  The left BKA stump with a wound VAC dressing in place   Neurological:      Mental Description . BLOOD    Special Requests RT HAND 7ML    Culture NO GROWTH 5 DAYS     Culture, Anaerobic and Aerobic [[id number]] (Abnormal)  Collected: 11/04/21 2145   Order Status: Completed Specimen: Abscess Updated: 11/08/21 0843    Specimen Description . ABSCESS    Special Requests NOT REPORTED    Direct Exam MANY NEUTROPHILS Abnormal      RARE GRAM POSITIVE COCCI IN CLUSTERS Abnormal     Culture METHICILLIN RESISTANT STAPHYLOCOCCUS AUREUS MODERATE GROWTH Abnormal      NO ANAEROBIC ORGANISMS ISOLATED AT 3 DAYS Abnormal    Susceptibility     Methicillin resistant staphylococcus aureus     BACTERIAL SUSCEPTIBILITY PANEL DARIAN (Preliminary)     cefoxitin screen NOT REPORTED       ciprofloxacin NOT REPORTED       clindamycin <=0.25  Resistant     erythromycin 4  Resistant     gentamicin <=0.5   Gentamicin . .. Sensitive  Sensitive     Induced Clind Resist POSITIVE  Positive     levofloxacin <=0.12  Sensitive     linezolid NOT REPORTED       moxifloxacin NOT REPORTED       nitrofurantoin NOT REPORTED       oxacillin >=4  Resistant     penicillin >=0.5  Resistant     rifampin NOT REPORTED       Synercid NOT REPORTED       tetracycline <=1  Sensitive     tigecycline NOT REPORTED       trimethoprim-sulfamethoxazole <=10  Sensitive     vancomycin 1  Sensitive                 Wound Culture [[id number]] (Abnormal)  Collected: 11/04/21 0020   Order Status: Completed Specimen: No Site Given from Skin Updated: 11/06/21 0700    Specimen Description . SKIN LT LEG STUMP    Special Requests NOT REPORTED    Direct Exam MODERATE NEUTROPHILS Abnormal      FEW GRAM NEGATIVE RODS Abnormal      FEW GRAM POSITIVE COCCI IN CLUSTERS Abnormal     Culture STAPHYLOCOCCUS AUREUS MODERATE GROWTH This isolate is methicillin susceptible. Abnormal      KLEBSIELLA AEROGENES MODERATE GROWTH Abnormal    Susceptibility     Staphylococcus aureus Klebsiella aerogenes     BACTERIAL SUSCEPTIBILITY PANEL DARIAN BACTERIAL SUSCEPTIBILITY PANEL DARIAN     amikacin   NOT REPORTED       aztreonam   <=1  Sensitive     ceFAZolin   >=64  Resistant     cefepime   NOT REPORTED       cefoxitin screen NOT REPORTED         cefTRIAXone   <=1  Sensitive     ciprofloxacin NOT REPORTED   <=0.25  Sensitive     clindamycin <=0.25  Sensitive       ertapenem   NOT REPORTED       erythromycin <=0.25  Sensitive       gentamicin <=0.5   Gentamicin . .. Sensitive  Sensitive <=1  Sensitive     Induced Clind Resist NOT REPORTED         levofloxacin 0.25  Sensitive       linezolid NOT REPORTED         meropenem   NOT REPORTED       moxifloxacin NOT REPORTED         nitrofurantoin NOT REPORTED   NOT REPORTED       oxacillin 0.5  Sensitive       penicillin >=0.5  Resistant       piperacillin-tazobactam   <=4  Sensitive     rifampin NOT REPORTED         Synercid NOT REPORTED         tetracycline <=1  Sensitive       tigecycline NOT REPORTED   NOT REPORTED       tobramycin   <=1  Sensitive     trimethoprim-sulfamethoxazole <=10  Sensitive <=20  Sensitive     vancomycin NOT REPORTED          Culture, Urine [[id number]] Collected: 11/03/21 2104   Order Status: Completed Specimen: Urine, clean catch Updated: 11/04/21 2328    Specimen Description . CLEAN CATCH URINE    Special Requests NOT REPORTED    Culture NO SIGNIFICANT GROWTH   Wound Gram stain [[id number]] Collected: 11/04/21 0015   Order Status: Canceled Specimen: No Site Given from Wound    COVID-19, Rapid [[id number]] Collected: 11/03/21 2050   Order Status: Completed Specimen: Nasopharyngeal Swab Updated: 11/03/21 2127    Specimen Description . NASOPHARYNGEAL SWAB    SARS-CoV-2, Rapid Not Detected    Comment:        Rapid NAAT:  The specimen is NEGATIVE for SARS-CoV-2, the novel coronavirus associated with   COVID-19.         The ID NOW COVID-19 assay is designed to detect the virus that causes COVID-19 in patients   with signs and symptoms of infection who are suspected of COVID-19.    An individual without symptoms of COVID-19 and who is not shedding SARS-CoV-2 virus would   expect to have a negative (not detected) result in this assay. Negative results should be treated as presumptive and, if inconsistent with clinical signs   and symptoms or necessary for patient management,   should be tested with an alternative molecular assay. Negative results do not preclude   SARS-CoV-2 infection and   should not be used as the sole basis for patient management decisions.         Fact sheet for Healthcare Providers: Findives.pl   Fact sheet for Patients: FindDrives.pl           Methodology: Isothermal Nucleic Acid Amplification         Medications:      ceftaroline fosamil (TEFLARO) IVPB  600 mg IntraVENous Q12H    amitriptyline  75 mg Oral Nightly    apixaban  5 mg Oral BID    aspirin EC  81 mg Oral Daily    atorvastatin  10 mg Oral Nightly    docusate sodium  100 mg Oral BID    famotidine  20 mg Oral BID    ferrous sulfate  325 mg Oral BID    insulin glargine  25 Units SubCUTAneous Nightly    lactobacillus  1 tablet Oral BID    metoprolol tartrate  25 mg Oral BID    therapeutic multivitamin-minerals  1 tablet Oral Daily    tamsulosin  0.4 mg Oral Nightly    sodium chloride flush  5-40 mL IntraVENous 2 times per day    insulin lispro  0-12 Units SubCUTAneous TID WC    insulin lispro  0-6 Units SubCUTAneous Nightly           [facility]  Ary Kennedy MD  Perfect Serve messaging  OFFICE: [phone]      Electronically signed by Ary Kennedy MD on 11/13/2021 at 10:45 AM  Thank you for allowing us to participate in the care of this patient. Please call with questions. This note iscreated with the assistance of a speech recognition program.  While intending to generate a document that actually reflects the content of the visit, the document can still have some errors including those of syntax andsound a like substitutions which may escape proof reading.   In such instances, actual meaning can be extrapolated by contextual diversion.

## 2021-11-13 NOTE — CARE COORDINATION
Social Work-Contacted [facility]. They will admit today. [facility] will transport at [place]. Orders faxed. Nurse to call report to [phone].  Latasha Nolan
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
## 2021-11-14 NOTE — DISCHARGE SUMMARY
[facility]  Office: [address] [facility], DO, Liangkiko Christian, DO, Danika Olsen, DO, Mani Eucedal Blood, DO, Courtney Cervantes MD, Micki Skaggs MD, Brian Pulido MD, Sahil Bowling MD, Roseline Batista MD, Candi Monahan MD, Gertrudis Hoffmann MD, Nader Perez, DO, [place], DO, Fabio Stock MD,  Beronica Rodriguez DO, Verito Mckinney MD, Emigdio Chambers MD, Stone Howell MD, Kaela Lu MD, Neto Crain MD, Loly Seth MD, Clyde Valle MD, Yesenia Leo [facility], [facility], [facility], Luna Aguirre, [facility], Otilia Gonzalez, CNS, Adria Holt, CNP, Kayley Ceja, CNP, Bjorn Lacy, CNP, Vidal Ruvalcaba, CNP, Addy White, CNP, Michael Ng PA-C, Steve Segura, [facility], Bessie Myers, CNP, Avery Raymond, CNP, Yandel Schwartz, CNP, Jorge Guadarrama, CNP, Ivania Pal, CNP, Emre Ramos, CNP, [facility]    Discharge Summary     Patient ID: Kylie Crowell  :  1955   MRN: [id number]     ACCOUNT:  [de-identified]   Patient's PCP: MARCELO Menchaca CNP  Admit Date: 11/3/2021   Discharge Date: 2021 Length of Stay: 10  Code Status:  Prior  Admitting Physician: Roseline Batista MD  Discharge Physician: Roseline Batista MD     Active Discharge Diagnoses:     Hospital Problem Lists:  Principal Problem:    Cellulitis of left lower extremity  Active Problems:    Type 2 diabetes mellitus with diabetic polyneuropathy, with long-term current use of insulin (HCC)    Esophageal cancer ([facility]    COPD without exacerbation ([facility]    S/P BKA (below knee amputation) [facility]    Essential hypertension  Resolved Problems:    Cellulitis      Admission Condition:  fair   Discharged Condition: good    Hospital Stay:     Hospital Course: admitted with worsening left BKA stump draining wound after falling out of his wheel chair. Wound cultures positive for MSSA, Klebsiella, MRSA. ID recommended ceftaroline through 2021. A wound VAC was also placed as per vascular surgery recommendation. Patient was discharged in stable condition to SNF. Consultations:    Consults:     Final Specialist Recommendations/Findings:   IP CONSULT TO HOSPITALIST  PHARMACY TO DOSE VANCOMYCIN  IP CONSULT TO VASCULAR SURGERY  IP CONSULT TO INFECTIOUS DISEASES      The patient was seen and examined on day of discharge and this discharge summary is in conjunction with any daily progress note from day of discharge. Discharge plan:     Disposition: [facility]    Physician Follow Up:   MARCELO Vital - CNP  [address]          Jewel Yaya, [address] [place] [address]  [phone]    Schedule an appointment as soon as possible for a visit in 1 week         Diet: Diabetic diet. Activity: As tolerated  Discharge Medications:      Medication List      START taking these medications    ceftaroline fosamil 600 MG Solr  Commonly known as: TEFLARO  Infuse 600 mg intravenously every 12 hours for 28 days     insulin lispro 100 UNIT/ML injection vial  Commonly known as: HUMALOG  Inject 0-12 Units into the skin 3 times daily (with meals)     oxyCODONE-acetaminophen 5-325 MG per tablet  Commonly known as: PERCOCET  Take 1 tablet by mouth every 8 hours as needed for Pain for up to 3 days.         CHANGE how you take these medications    budesonide-formoterol 160-4.5 MCG/ACT Aero  Commonly known as: SYMBICORT  Inhale 2 puffs into the lungs 2 times daily  What changed: additional instructions     tamsulosin 0.4 MG capsule  Commonly known as: FLOMAX  Take 1 capsule by mouth daily  What changed: when to take this        CONTINUE taking these medications    aluminum & magnesium hydroxide-simethicone 200-200-20 MG/5ML Susp suspension  Commonly known as: MAALOX     amitriptyline 75 MG tablet  Commonly known as: ELAVIL  Take 1 tablet by mouth nightly     apixaban 5 MG Tabs tablet  Commonly known as: Eliquis  Take 1 tablet by mouth 2 times daily     aspirin EC 81 MG EC tablet     atorvastatin 10 MG tablet  Commonly known as: LIPITOR     bisacodyl 5 MG EC tablet  Commonly known as: DULCOLAX  Take 1 tablet by mouth daily as needed for Constipation     docusate sodium 100 MG capsule  Commonly known as: Colace  Take 1 capsule by mouth 2 times daily     famotidine 20 MG tablet  Commonly known as: PEPCID  Take 1 tablet by mouth 2 times daily     ferrous sulfate 325 (65 Fe) MG tablet  Commonly known as: IRON 325  Take 1 tablet by mouth 2 times daily     glucose monitoring kit  1 kit by Does not apply route daily     hydrOXYzine 25 MG capsule  Commonly known as: VISTARIL  Take 1 capsule by mouth 3 times daily as needed for Anxiety     lactobacillus Tabs  Take 1 tablet by mouth 2 times daily     Lancets Misc  1 each by Does not apply route 3 times daily     Levemir 100 UNIT/ML injection vial  Generic drug: insulin detemir  Inject 25 units, subcutaenously daily with diner     metFORMIN 500 MG tablet  Commonly known as: GLUCOPHAGE  Take 1 tablet by mouth 2 times daily (with meals)     metoprolol tartrate 25 MG tablet  Commonly known as: LOPRESSOR  Take 1 tablet by mouth 2 times daily Hold for heart rate less than 60 or systolic blood pressure less than 100     naloxegol 25 MG Tabs tablet  Commonly known as: MOVANTIK  Take 1 tablet by mouth every morning     simethicone 80 MG chewable tablet  Commonly known as: MYLICON  Take 1 tablet by mouth every 6 hours as needed for Flatulence     therapeutic multivitamin-minerals tablet     Ventolin  (90 Base) MCG/ACT inhaler  Generic drug: albuterol sulfate HFA        STOP taking these medications    acetaminophen 500 MG tablet  Commonly known as: APAP Extra Strength     cephALEXin 500 MG capsule  Commonly known as: KEFLEX     insulin aspart 100 UNIT/ML injection vial  Commonly known as: NovoLOG     mineral oil-hydrophilic petrolatum ointment     naloxone 4 MG/0.1ML Liqd nasal spray     ondansetron 4 MG tablet  Commonly known as: Doe Brine           Where to Get Your Medications      You can get these medications from any pharmacy    Bring a paper prescription for each of these medications  oxyCODONE-acetaminophen 5-325 MG per tablet     Information about where to get these medications is not yet available    Ask your nurse or doctor about these medications  ceftaroline fosamil 600 MG Solr  insulin lispro 100 UNIT/ML injection vial         No discharge procedures on file. Time Spent on discharge is  20 mins in patient examination, evaluation, counseling as well as medication reconciliation, prescriptions for required medications, discharge plan and follow up. Electronically signed by   Erick Douglas MD  11/13/2021  7:29 PM      Thank you MARCELO Fry - PRAFUL for the opportunity to be involved in this patient's care.

## 2021-11-14 NOTE — FLOWSHEET NOTE
Patient states well, did not really want to share much. All patient wanted was a prayer.  shared in presence , prayer.   Follow up as needed,.     11/13/21 1042   Encounter Summary   Services provided to: Patient   Referral/Consult From: [facility]   (11-13-21)   Complexity of Encounter Low   Length of Encounter 15 minutes   Spiritual Assessment Completed Yes   Routine   Type Follow up   Assessment Passive   Intervention Explored feelings, thoughts, concerns; Prayer; Discussed illness/injury and it's impact   Outcome Expressed gratitude

## 2021-11-16 ENCOUNTER — HOSPITAL ENCOUNTER (OUTPATIENT)
Age: 66
Setting detail: SPECIMEN
Discharge: HOME | End: 2021-11-16

## 2021-11-16 LAB
ANION GAP SERPL CALCULATED.3IONS-SCNC: 12 MMOL/L (ref 9–17)
BUN BLDV-MCNC: 23 MG/DL (ref 8–23)
BUN/CREAT BLD: ABNORMAL (ref 9–20)
CALCIUM SERPL-MCNC: 9 MG/DL (ref 8.6–10.4)
CHLORIDE BLD-SCNC: 101 MMOL/L (ref 98–107)
CO2: 26 MMOL/L (ref 20–31)
CREAT SERPL-MCNC: 0.87 MG/DL (ref 0.7–1.2)
GFR AFRICAN AMERICAN: >60 ML/MIN
GFR NON-AFRICAN AMERICAN: >60 ML/MIN
GFR SERPL CREATININE-BSD FRML MDRD: ABNORMAL ML/MIN/{1.73_M2}
GFR SERPL CREATININE-BSD FRML MDRD: ABNORMAL ML/MIN/{1.73_M2}
GLUCOSE BLD-MCNC: 160 MG/DL (ref 70–99)
HCT VFR BLD CALC: 36 % (ref 40.7–50.3)
HEMOGLOBIN: 10.4 G/DL (ref 13–17)
MCH RBC QN AUTO: 26.3 PG (ref 25.2–33.5)
MCHC RBC AUTO-ENTMCNC: 28.9 G/DL (ref 28.4–34.8)
MCV RBC AUTO: 91.1 FL (ref 82.6–102.9)
NRBC AUTOMATED: 0 PER 100 WBC
PDW BLD-RTO: 14.9 % (ref 11.8–14.4)
PLATELET # BLD: 592 K/UL (ref 138–453)
PMV BLD AUTO: 9.7 FL (ref 8.1–13.5)
POTASSIUM SERPL-SCNC: 4.6 MMOL/L (ref 3.7–5.3)
RBC # BLD: 3.95 M/UL (ref 4.21–5.77)
SODIUM BLD-SCNC: 139 MMOL/L (ref 135–144)
WBC # BLD: 13.5 K/UL (ref 3.5–11.3)

## 2021-11-16 PROCEDURE — 85027 COMPLETE CBC AUTOMATED: CPT

## 2021-11-16 PROCEDURE — 36415 COLL VENOUS BLD VENIPUNCTURE: CPT

## 2021-11-16 PROCEDURE — P9603 ONE-WAY ALLOW PRORATED MILES: HCPCS

## 2021-11-16 PROCEDURE — 80048 BASIC METABOLIC PNL TOTAL CA: CPT

## 2021-11-19 ENCOUNTER — HOSPITAL ENCOUNTER (OUTPATIENT)
Age: 66
Setting detail: SPECIMEN
Discharge: HOME | End: 2021-11-19

## 2021-11-19 LAB
ALBUMIN SERPL-MCNC: 3 G/DL (ref 3.5–5.2)
ALBUMIN/GLOBULIN RATIO: 0.7 (ref 1–2.5)
ALP BLD-CCNC: 120 U/L (ref 40–129)
ALT SERPL-CCNC: 11 U/L (ref 5–41)
ANION GAP SERPL CALCULATED.3IONS-SCNC: 11 MMOL/L (ref 9–17)
AST SERPL-CCNC: 11 U/L
BILIRUB SERPL-MCNC: <0.1 MG/DL (ref 0.3–1.2)
BUN BLDV-MCNC: 20 MG/DL (ref 8–23)
BUN/CREAT BLD: ABNORMAL (ref 9–20)
CALCIUM SERPL-MCNC: 8.9 MG/DL (ref 8.6–10.4)
CHLORIDE BLD-SCNC: 102 MMOL/L (ref 98–107)
CO2: 26 MMOL/L (ref 20–31)
CREAT SERPL-MCNC: 1.15 MG/DL (ref 0.7–1.2)
GFR AFRICAN AMERICAN: >60 ML/MIN
GFR NON-AFRICAN AMERICAN: >60 ML/MIN
GFR SERPL CREATININE-BSD FRML MDRD: ABNORMAL ML/MIN/{1.73_M2}
GFR SERPL CREATININE-BSD FRML MDRD: ABNORMAL ML/MIN/{1.73_M2}
GLUCOSE BLD-MCNC: 123 MG/DL (ref 70–99)
HCT VFR BLD CALC: 36 % (ref 40.7–50.3)
HEMOGLOBIN: 10.3 G/DL (ref 13–17)
MCH RBC QN AUTO: 26 PG (ref 25.2–33.5)
MCHC RBC AUTO-ENTMCNC: 28.6 G/DL (ref 28.4–34.8)
MCV RBC AUTO: 90.9 FL (ref 82.6–102.9)
NRBC AUTOMATED: 0 PER 100 WBC
PDW BLD-RTO: 15.1 % (ref 11.8–14.4)
PLATELET # BLD: 524 K/UL (ref 138–453)
PMV BLD AUTO: 9.7 FL (ref 8.1–13.5)
POTASSIUM SERPL-SCNC: 5.1 MMOL/L (ref 3.7–5.3)
RBC # BLD: 3.96 M/UL (ref 4.21–5.77)
SODIUM BLD-SCNC: 139 MMOL/L (ref 135–144)
TOTAL PROTEIN: 7.4 G/DL (ref 6.4–8.3)
WBC # BLD: 8.3 K/UL (ref 3.5–11.3)

## 2021-11-19 PROCEDURE — 80053 COMPREHEN METABOLIC PANEL: CPT

## 2021-11-19 PROCEDURE — 85027 COMPLETE CBC AUTOMATED: CPT

## 2021-11-19 PROCEDURE — 36415 COLL VENOUS BLD VENIPUNCTURE: CPT

## 2021-11-19 PROCEDURE — P9603 ONE-WAY ALLOW PRORATED MILES: HCPCS

## 2021-11-23 ENCOUNTER — HOSPITAL ENCOUNTER (OUTPATIENT)
Age: 66
Setting detail: SPECIMEN
Discharge: HOME | End: 2021-11-23

## 2021-11-23 LAB
ALBUMIN SERPL-MCNC: 3 G/DL (ref 3.5–5.2)
ALBUMIN/GLOBULIN RATIO: 0.7 (ref 1–2.5)
ALP BLD-CCNC: 112 U/L (ref 40–129)
ALT SERPL-CCNC: 9 U/L (ref 5–41)
ANION GAP SERPL CALCULATED.3IONS-SCNC: 12 MMOL/L (ref 9–17)
AST SERPL-CCNC: 11 U/L
BILIRUB SERPL-MCNC: <0.1 MG/DL (ref 0.3–1.2)
BUN BLDV-MCNC: 27 MG/DL (ref 8–23)
BUN/CREAT BLD: ABNORMAL (ref 9–20)
CALCIUM SERPL-MCNC: 9.2 MG/DL (ref 8.6–10.4)
CHLORIDE BLD-SCNC: 110 MMOL/L (ref 98–107)
CO2: 18 MMOL/L (ref 20–31)
CREAT SERPL-MCNC: 1.05 MG/DL (ref 0.7–1.2)
GFR AFRICAN AMERICAN: >60 ML/MIN
GFR NON-AFRICAN AMERICAN: >60 ML/MIN
GFR SERPL CREATININE-BSD FRML MDRD: ABNORMAL ML/MIN/{1.73_M2}
GFR SERPL CREATININE-BSD FRML MDRD: ABNORMAL ML/MIN/{1.73_M2}
GLUCOSE BLD-MCNC: 98 MG/DL (ref 70–99)
HCT VFR BLD CALC: 33.8 % (ref 40.7–50.3)
HEMOGLOBIN: 9.7 G/DL (ref 13–17)
MCH RBC QN AUTO: 26.1 PG (ref 25.2–33.5)
MCHC RBC AUTO-ENTMCNC: 28.7 G/DL (ref 28.4–34.8)
MCV RBC AUTO: 90.9 FL (ref 82.6–102.9)
NRBC AUTOMATED: 0 PER 100 WBC
PDW BLD-RTO: 15.2 % (ref 11.8–14.4)
PLATELET # BLD: 379 K/UL (ref 138–453)
PMV BLD AUTO: 9.6 FL (ref 8.1–13.5)
POTASSIUM SERPL-SCNC: 5.6 MMOL/L (ref 3.7–5.3)
RBC # BLD: 3.72 M/UL (ref 4.21–5.77)
SODIUM BLD-SCNC: 140 MMOL/L (ref 135–144)
TOTAL PROTEIN: 7.1 G/DL (ref 6.4–8.3)
WBC # BLD: 7 K/UL (ref 3.5–11.3)

## 2021-11-23 PROCEDURE — P9603 ONE-WAY ALLOW PRORATED MILES: HCPCS

## 2021-11-23 PROCEDURE — 80053 COMPREHEN METABOLIC PANEL: CPT

## 2021-11-23 PROCEDURE — 85027 COMPLETE CBC AUTOMATED: CPT

## 2021-11-23 PROCEDURE — 36415 COLL VENOUS BLD VENIPUNCTURE: CPT

## 2021-11-24 ENCOUNTER — HOSPITAL ENCOUNTER (OUTPATIENT)
Age: 66
Setting detail: SPECIMEN
Discharge: HOME | End: 2021-11-24

## 2021-11-24 LAB — POTASSIUM SERPL-SCNC: 5.9 MMOL/L (ref 3.7–5.3)

## 2021-11-24 PROCEDURE — 36415 COLL VENOUS BLD VENIPUNCTURE: CPT

## 2021-11-24 PROCEDURE — 87493 C DIFF AMPLIFIED PROBE: CPT

## 2021-11-24 PROCEDURE — P9603 ONE-WAY ALLOW PRORATED MILES: HCPCS

## 2021-11-24 PROCEDURE — 84132 ASSAY OF SERUM POTASSIUM: CPT

## 2021-11-25 ENCOUNTER — HOSPITAL ENCOUNTER (OUTPATIENT)
Age: 66
Setting detail: SPECIMEN
Discharge: HOME | End: 2021-11-25

## 2021-11-25 ENCOUNTER — APPOINTMENT (OUTPATIENT)
Dept: GENERAL RADIOLOGY | Age: 66
DRG: 641 | End: 2021-11-25
Payer: MEDICARE

## 2021-11-25 ENCOUNTER — HOSPITAL ENCOUNTER (INPATIENT)
Age: 66
LOS: 6 days | Discharge: NURSING, REHAB OR LONG-TERM CARE | DRG: 641 | End: 2021-12-02
Attending: EMERGENCY MEDICINE | Admitting: INTERNAL MEDICINE
Payer: MEDICARE

## 2021-11-25 DIAGNOSIS — R19.7 DIARRHEA, UNSPECIFIED TYPE: ICD-10-CM

## 2021-11-25 DIAGNOSIS — L03.116 CELLULITIS OF LEFT LOWER EXTREMITY: ICD-10-CM

## 2021-11-25 DIAGNOSIS — E87.5 HYPERKALEMIA: Primary | ICD-10-CM

## 2021-11-25 LAB
-: ABNORMAL
ABSOLUTE EOS #: 0.27 K/UL (ref 0–0.44)
ABSOLUTE IMMATURE GRANULOCYTE: 0.03 K/UL (ref 0–0.3)
ABSOLUTE LYMPH #: 1.8 K/UL (ref 1.1–3.7)
ABSOLUTE MONO #: 0.76 K/UL (ref 0.1–1.2)
ALBUMIN SERPL-MCNC: 3.2 G/DL (ref 3.5–5.2)
ALBUMIN/GLOBULIN RATIO: ABNORMAL (ref 1–2.5)
ALP BLD-CCNC: 127 U/L (ref 40–129)
ALT SERPL-CCNC: 6 U/L (ref 5–41)
AMORPHOUS: ABNORMAL
ANION GAP SERPL CALCULATED.3IONS-SCNC: 11 MMOL/L (ref 9–17)
ANION GAP SERPL CALCULATED.3IONS-SCNC: 8 MMOL/L (ref 9–17)
AST SERPL-CCNC: 8 U/L
BACTERIA: ABNORMAL
BASOPHILS # BLD: 1 % (ref 0–2)
BASOPHILS ABSOLUTE: 0.08 K/UL (ref 0–0.2)
BILIRUB SERPL-MCNC: 0.11 MG/DL (ref 0.3–1.2)
BILIRUBIN URINE: NEGATIVE
BUN BLDV-MCNC: 26 MG/DL (ref 8–23)
BUN BLDV-MCNC: 30 MG/DL (ref 8–23)
BUN/CREAT BLD: 30 (ref 9–20)
BUN/CREAT BLD: 30 (ref 9–20)
CALCIUM SERPL-MCNC: 8.9 MG/DL (ref 8.6–10.4)
CALCIUM SERPL-MCNC: 8.9 MG/DL (ref 8.6–10.4)
CASTS UA: ABNORMAL /LPF
CHLORIDE BLD-SCNC: 107 MMOL/L (ref 98–107)
CHLORIDE BLD-SCNC: 109 MMOL/L (ref 98–107)
CHP ED QC CHECK: YES
CHP ED QC CHECK: YES
CO2: 18 MMOL/L (ref 20–31)
CO2: 21 MMOL/L (ref 20–31)
COLOR: YELLOW
COMMENT UA: ABNORMAL
CREAT SERPL-MCNC: 0.88 MG/DL (ref 0.7–1.2)
CREAT SERPL-MCNC: 1.01 MG/DL (ref 0.7–1.2)
CRYSTALS, UA: ABNORMAL /HPF
DIFFERENTIAL TYPE: ABNORMAL
EOSINOPHILS RELATIVE PERCENT: 4 % (ref 1–4)
EPITHELIAL CELLS UA: ABNORMAL /HPF (ref 0–5)
GFR AFRICAN AMERICAN: >60 ML/MIN
GFR AFRICAN AMERICAN: >60 ML/MIN
GFR NON-AFRICAN AMERICAN: >60 ML/MIN
GFR NON-AFRICAN AMERICAN: >60 ML/MIN
GFR SERPL CREATININE-BSD FRML MDRD: ABNORMAL ML/MIN/{1.73_M2}
GLUCOSE BLD-MCNC: 119 MG/DL
GLUCOSE BLD-MCNC: 119 MG/DL (ref 75–110)
GLUCOSE BLD-MCNC: 159 MG/DL
GLUCOSE BLD-MCNC: 159 MG/DL (ref 75–110)
GLUCOSE BLD-MCNC: 176 MG/DL (ref 70–99)
GLUCOSE BLD-MCNC: 341 MG/DL (ref 75–110)
GLUCOSE BLD-MCNC: 344 MG/DL (ref 75–110)
GLUCOSE BLD-MCNC: 71 MG/DL (ref 75–110)
GLUCOSE BLD-MCNC: 83 MG/DL (ref 70–99)
GLUCOSE URINE: NEGATIVE
HCT VFR BLD CALC: 37.2 % (ref 40.7–50.3)
HEMOGLOBIN: 10.8 G/DL (ref 13–17)
IMMATURE GRANULOCYTES: 0 %
INR BLD: 1.1
KETONES, URINE: NEGATIVE
LACTIC ACID, SEPSIS WHOLE BLOOD: NORMAL MMOL/L (ref 0.5–1.9)
LACTIC ACID, SEPSIS: 0.9 MMOL/L (ref 0.5–1.9)
LEUKOCYTE ESTERASE, URINE: NEGATIVE
LYMPHOCYTES # BLD: 25 % (ref 24–43)
MCH RBC QN AUTO: 26 PG (ref 25.2–33.5)
MCHC RBC AUTO-ENTMCNC: 29 G/DL (ref 28.4–34.8)
MCV RBC AUTO: 89.4 FL (ref 82.6–102.9)
MONOCYTES # BLD: 11 % (ref 3–12)
MUCUS: ABNORMAL
NITRITE, URINE: NEGATIVE
NRBC AUTOMATED: 0 PER 100 WBC
OTHER OBSERVATIONS UA: ABNORMAL
PARTIAL THROMBOPLASTIN TIME: 31.9 SEC (ref 23.9–33.8)
PDW BLD-RTO: 15.5 % (ref 11.8–14.4)
PH UA: 5 (ref 5–8)
PLATELET # BLD: 336 K/UL (ref 138–453)
PLATELET ESTIMATE: ABNORMAL
PMV BLD AUTO: 9.3 FL (ref 8.1–13.5)
POTASSIUM SERPL-SCNC: 4.8 MMOL/L (ref 3.7–5.3)
POTASSIUM SERPL-SCNC: 5.8 MMOL/L (ref 3.7–5.3)
POTASSIUM SERPL-SCNC: 6.7 MMOL/L (ref 3.7–5.3)
PROTEIN UA: ABNORMAL
PROTHROMBIN TIME: 13.8 SEC (ref 11.5–14.2)
RBC # BLD: 4.16 M/UL (ref 4.21–5.77)
RBC # BLD: ABNORMAL 10*6/UL
RBC UA: ABNORMAL /HPF (ref 0–2)
RENAL EPITHELIAL, UA: ABNORMAL /HPF
SEG NEUTROPHILS: 59 % (ref 36–65)
SEGMENTED NEUTROPHILS ABSOLUTE COUNT: 4.25 K/UL (ref 1.5–8.1)
SODIUM BLD-SCNC: 136 MMOL/L (ref 135–144)
SODIUM BLD-SCNC: 138 MMOL/L (ref 135–144)
SPECIFIC GRAVITY UA: 1.02 (ref 1–1.03)
TOTAL PROTEIN: 7.6 G/DL (ref 6.4–8.3)
TRICHOMONAS: ABNORMAL
TURBIDITY: ABNORMAL
URINE HGB: NEGATIVE
UROBILINOGEN, URINE: NORMAL
WBC # BLD: 7.2 K/UL (ref 3.5–11.3)
WBC # BLD: ABNORMAL 10*3/UL
WBC UA: ABNORMAL /HPF (ref 0–5)
YEAST: ABNORMAL

## 2021-11-25 PROCEDURE — 85025 COMPLETE CBC W/AUTO DIFF WBC: CPT

## 2021-11-25 PROCEDURE — 6370000000 HC RX 637 (ALT 250 FOR IP): Performed by: INTERNAL MEDICINE

## 2021-11-25 PROCEDURE — G0378 HOSPITAL OBSERVATION PER HR: HCPCS

## 2021-11-25 PROCEDURE — P9603 ONE-WAY ALLOW PRORATED MILES: HCPCS

## 2021-11-25 PROCEDURE — 71045 X-RAY EXAM CHEST 1 VIEW: CPT

## 2021-11-25 PROCEDURE — 96366 THER/PROPH/DIAG IV INF ADDON: CPT

## 2021-11-25 PROCEDURE — 87086 URINE CULTURE/COLONY COUNT: CPT

## 2021-11-25 PROCEDURE — 96375 TX/PRO/DX INJ NEW DRUG ADDON: CPT

## 2021-11-25 PROCEDURE — 85730 THROMBOPLASTIN TIME PARTIAL: CPT

## 2021-11-25 PROCEDURE — 80048 BASIC METABOLIC PNL TOTAL CA: CPT

## 2021-11-25 PROCEDURE — 6360000002 HC RX W HCPCS: Performed by: INTERNAL MEDICINE

## 2021-11-25 PROCEDURE — 6370000000 HC RX 637 (ALT 250 FOR IP): Performed by: EMERGENCY MEDICINE

## 2021-11-25 PROCEDURE — 87449 NOS EACH ORGANISM AG IA: CPT

## 2021-11-25 PROCEDURE — 94761 N-INVAS EAR/PLS OXIMETRY MLT: CPT

## 2021-11-25 PROCEDURE — 2580000003 HC RX 258: Performed by: EMERGENCY MEDICINE

## 2021-11-25 PROCEDURE — 83605 ASSAY OF LACTIC ACID: CPT

## 2021-11-25 PROCEDURE — 99284 EMERGENCY DEPT VISIT MOD MDM: CPT

## 2021-11-25 PROCEDURE — 84132 ASSAY OF SERUM POTASSIUM: CPT

## 2021-11-25 PROCEDURE — 82947 ASSAY GLUCOSE BLOOD QUANT: CPT

## 2021-11-25 PROCEDURE — 85610 PROTHROMBIN TIME: CPT

## 2021-11-25 PROCEDURE — 2580000003 HC RX 258: Performed by: INTERNAL MEDICINE

## 2021-11-25 PROCEDURE — 81001 URINALYSIS AUTO W/SCOPE: CPT

## 2021-11-25 PROCEDURE — 94640 AIRWAY INHALATION TREATMENT: CPT

## 2021-11-25 PROCEDURE — 96367 TX/PROPH/DG ADDL SEQ IV INF: CPT

## 2021-11-25 PROCEDURE — 87324 CLOSTRIDIUM AG IA: CPT

## 2021-11-25 PROCEDURE — 99222 1ST HOSP IP/OBS MODERATE 55: CPT | Performed by: INTERNAL MEDICINE

## 2021-11-25 PROCEDURE — 96365 THER/PROPH/DIAG IV INF INIT: CPT

## 2021-11-25 PROCEDURE — 99220 PR INITIAL OBSERVATION CARE/DAY 70 MINUTES: CPT | Performed by: INTERNAL MEDICINE

## 2021-11-25 PROCEDURE — 2500000003 HC RX 250 WO HCPCS: Performed by: EMERGENCY MEDICINE

## 2021-11-25 PROCEDURE — 36415 COLL VENOUS BLD VENIPUNCTURE: CPT

## 2021-11-25 PROCEDURE — 6370000000 HC RX 637 (ALT 250 FOR IP): Performed by: NURSE PRACTITIONER

## 2021-11-25 PROCEDURE — 96376 TX/PRO/DX INJ SAME DRUG ADON: CPT

## 2021-11-25 PROCEDURE — 87040 BLOOD CULTURE FOR BACTERIA: CPT

## 2021-11-25 PROCEDURE — 80053 COMPREHEN METABOLIC PANEL: CPT

## 2021-11-25 PROCEDURE — 93005 ELECTROCARDIOGRAM TRACING: CPT | Performed by: EMERGENCY MEDICINE

## 2021-11-25 RX ORDER — POTASSIUM CHLORIDE 7.45 MG/ML
10 INJECTION INTRAVENOUS PRN
Status: DISCONTINUED | OUTPATIENT
Start: 2021-11-25 | End: 2021-12-03 | Stop reason: HOSPADM

## 2021-11-25 RX ORDER — BUDESONIDE AND FORMOTEROL FUMARATE DIHYDRATE 160; 4.5 UG/1; UG/1
2 AEROSOL RESPIRATORY (INHALATION) 2 TIMES DAILY
Status: DISCONTINUED | OUTPATIENT
Start: 2021-11-25 | End: 2021-12-03 | Stop reason: HOSPADM

## 2021-11-25 RX ORDER — DEXTROSE MONOHYDRATE 25 G/50ML
12.5 INJECTION, SOLUTION INTRAVENOUS PRN
Status: DISCONTINUED | OUTPATIENT
Start: 2021-11-25 | End: 2021-12-03 | Stop reason: HOSPADM

## 2021-11-25 RX ORDER — SODIUM CHLORIDE 9 MG/ML
25 INJECTION, SOLUTION INTRAVENOUS PRN
Status: DISCONTINUED | OUTPATIENT
Start: 2021-11-25 | End: 2021-12-03 | Stop reason: HOSPADM

## 2021-11-25 RX ORDER — FAMOTIDINE 20 MG/1
20 TABLET, FILM COATED ORAL 2 TIMES DAILY
Status: DISCONTINUED | OUTPATIENT
Start: 2021-11-25 | End: 2021-12-03 | Stop reason: HOSPADM

## 2021-11-25 RX ORDER — SODIUM CHLORIDE 0.9 % (FLUSH) 0.9 %
5-40 SYRINGE (ML) INJECTION EVERY 12 HOURS SCHEDULED
Status: DISCONTINUED | OUTPATIENT
Start: 2021-11-25 | End: 2021-12-03 | Stop reason: HOSPADM

## 2021-11-25 RX ORDER — NICOTINE POLACRILEX 4 MG
15 LOZENGE BUCCAL PRN
Status: DISCONTINUED | OUTPATIENT
Start: 2021-11-25 | End: 2021-12-03 | Stop reason: HOSPADM

## 2021-11-25 RX ORDER — INSULIN GLARGINE 100 [IU]/ML
25 INJECTION, SOLUTION SUBCUTANEOUS
Status: DISCONTINUED | OUTPATIENT
Start: 2021-11-25 | End: 2021-12-03 | Stop reason: HOSPADM

## 2021-11-25 RX ORDER — ALBUTEROL SULFATE 90 UG/1
2 AEROSOL, METERED RESPIRATORY (INHALATION) EVERY 4 HOURS PRN
Status: DISCONTINUED | OUTPATIENT
Start: 2021-11-25 | End: 2021-12-03 | Stop reason: HOSPADM

## 2021-11-25 RX ORDER — MAGNESIUM SULFATE 1 G/100ML
1000 INJECTION INTRAVENOUS PRN
Status: DISCONTINUED | OUTPATIENT
Start: 2021-11-25 | End: 2021-12-03 | Stop reason: HOSPADM

## 2021-11-25 RX ORDER — MORPHINE SULFATE 4 MG/ML
4 INJECTION, SOLUTION INTRAMUSCULAR; INTRAVENOUS
Status: DISCONTINUED | OUTPATIENT
Start: 2021-11-25 | End: 2021-12-03 | Stop reason: HOSPADM

## 2021-11-25 RX ORDER — 0.9 % SODIUM CHLORIDE 0.9 %
1000 INTRAVENOUS SOLUTION INTRAVENOUS ONCE
Status: COMPLETED | OUTPATIENT
Start: 2021-11-25 | End: 2021-11-25

## 2021-11-25 RX ORDER — MORPHINE SULFATE 2 MG/ML
2 INJECTION, SOLUTION INTRAMUSCULAR; INTRAVENOUS
Status: DISCONTINUED | OUTPATIENT
Start: 2021-11-25 | End: 2021-12-03 | Stop reason: HOSPADM

## 2021-11-25 RX ORDER — ASPIRIN 81 MG/1
81 TABLET ORAL DAILY
Status: DISCONTINUED | OUTPATIENT
Start: 2021-11-25 | End: 2021-12-03 | Stop reason: HOSPADM

## 2021-11-25 RX ORDER — ATORVASTATIN CALCIUM 10 MG/1
10 TABLET, FILM COATED ORAL NIGHTLY
Status: DISCONTINUED | OUTPATIENT
Start: 2021-11-25 | End: 2021-12-03 | Stop reason: HOSPADM

## 2021-11-25 RX ORDER — TAMSULOSIN HYDROCHLORIDE 0.4 MG/1
0.4 CAPSULE ORAL NIGHTLY
Status: DISCONTINUED | OUTPATIENT
Start: 2021-11-25 | End: 2021-12-03 | Stop reason: HOSPADM

## 2021-11-25 RX ORDER — DEXTROSE MONOHYDRATE 50 MG/ML
100 INJECTION, SOLUTION INTRAVENOUS PRN
Status: DISCONTINUED | OUTPATIENT
Start: 2021-11-25 | End: 2021-12-03 | Stop reason: HOSPADM

## 2021-11-25 RX ORDER — ACETAMINOPHEN 325 MG/1
650 TABLET ORAL EVERY 6 HOURS PRN
Status: DISCONTINUED | OUTPATIENT
Start: 2021-11-25 | End: 2021-11-29

## 2021-11-25 RX ORDER — LANOLIN ALCOHOL/MO/W.PET/CERES
325 CREAM (GRAM) TOPICAL 2 TIMES DAILY
Status: DISCONTINUED | OUTPATIENT
Start: 2021-11-25 | End: 2021-12-03 | Stop reason: HOSPADM

## 2021-11-25 RX ORDER — DEXTROSE MONOHYDRATE 25 G/50ML
25 INJECTION, SOLUTION INTRAVENOUS ONCE
Status: COMPLETED | OUTPATIENT
Start: 2021-11-25 | End: 2021-11-25

## 2021-11-25 RX ORDER — SODIUM CHLORIDE 0.9 % (FLUSH) 0.9 %
10 SYRINGE (ML) INJECTION PRN
Status: DISCONTINUED | OUTPATIENT
Start: 2021-11-25 | End: 2021-12-03 | Stop reason: HOSPADM

## 2021-11-25 RX ORDER — M-VIT,TX,IRON,MINS/CALC/FOLIC 27MG-0.4MG
1 TABLET ORAL DAILY
Status: DISCONTINUED | OUTPATIENT
Start: 2021-11-25 | End: 2021-12-03 | Stop reason: HOSPADM

## 2021-11-25 RX ORDER — SIMETHICONE 80 MG
80 TABLET,CHEWABLE ORAL EVERY 6 HOURS PRN
Status: DISCONTINUED | OUTPATIENT
Start: 2021-11-25 | End: 2021-12-03 | Stop reason: HOSPADM

## 2021-11-25 RX ORDER — LANOLIN ALCOHOL/MO/W.PET/CERES
3 CREAM (GRAM) TOPICAL ONCE
Status: COMPLETED | OUTPATIENT
Start: 2021-11-25 | End: 2021-11-25

## 2021-11-25 RX ORDER — ONDANSETRON 4 MG/1
4 TABLET, ORALLY DISINTEGRATING ORAL EVERY 8 HOURS PRN
Status: DISCONTINUED | OUTPATIENT
Start: 2021-11-25 | End: 2021-12-03 | Stop reason: HOSPADM

## 2021-11-25 RX ORDER — DOCUSATE SODIUM 100 MG/1
100 CAPSULE, LIQUID FILLED ORAL 2 TIMES DAILY
Status: DISCONTINUED | OUTPATIENT
Start: 2021-11-25 | End: 2021-12-03 | Stop reason: HOSPADM

## 2021-11-25 RX ORDER — POTASSIUM CHLORIDE 20 MEQ/1
40 TABLET, EXTENDED RELEASE ORAL PRN
Status: DISCONTINUED | OUTPATIENT
Start: 2021-11-25 | End: 2021-12-03 | Stop reason: HOSPADM

## 2021-11-25 RX ORDER — MAGNESIUM HYDROXIDE/ALUMINUM HYDROXICE/SIMETHICONE 120; 1200; 1200 MG/30ML; MG/30ML; MG/30ML
10 SUSPENSION ORAL EVERY 6 HOURS PRN
Status: DISCONTINUED | OUTPATIENT
Start: 2021-11-25 | End: 2021-12-03 | Stop reason: HOSPADM

## 2021-11-25 RX ORDER — HYDROXYZINE HYDROCHLORIDE 25 MG/1
25 TABLET, FILM COATED ORAL 3 TIMES DAILY PRN
Status: DISCONTINUED | OUTPATIENT
Start: 2021-11-25 | End: 2021-12-03 | Stop reason: HOSPADM

## 2021-11-25 RX ORDER — ACETAMINOPHEN 650 MG/1
650 SUPPOSITORY RECTAL EVERY 6 HOURS PRN
Status: DISCONTINUED | OUTPATIENT
Start: 2021-11-25 | End: 2021-11-29

## 2021-11-25 RX ORDER — ONDANSETRON 2 MG/ML
4 INJECTION INTRAMUSCULAR; INTRAVENOUS EVERY 6 HOURS PRN
Status: DISCONTINUED | OUTPATIENT
Start: 2021-11-25 | End: 2021-12-03 | Stop reason: HOSPADM

## 2021-11-25 RX ADMIN — SODIUM BICARBONATE 50 MEQ: 84 INJECTION, SOLUTION INTRAVENOUS at 11:18

## 2021-11-25 RX ADMIN — INSULIN HUMAN 10 UNITS: 100 INJECTION, SOLUTION PARENTERAL at 11:16

## 2021-11-25 RX ADMIN — MORPHINE SULFATE 4 MG: 4 INJECTION INTRAVENOUS at 23:29

## 2021-11-25 RX ADMIN — INSULIN GLARGINE 25 UNITS: 100 INJECTION, SOLUTION SUBCUTANEOUS at 21:29

## 2021-11-25 RX ADMIN — SODIUM ZIRCONIUM CYCLOSILICATE 10 G: 10 POWDER, FOR SUSPENSION ORAL at 22:03

## 2021-11-25 RX ADMIN — SODIUM CHLORIDE, PRESERVATIVE FREE 10 ML: 5 INJECTION INTRAVENOUS at 21:30

## 2021-11-25 RX ADMIN — FAMOTIDINE 20 MG: 20 TABLET ORAL at 22:06

## 2021-11-25 RX ADMIN — CEFTRIAXONE SODIUM 1000 MG: 1 INJECTION, POWDER, FOR SOLUTION INTRAMUSCULAR; INTRAVENOUS at 14:51

## 2021-11-25 RX ADMIN — SODIUM ZIRCONIUM CYCLOSILICATE 10 G: 10 POWDER, FOR SUSPENSION ORAL at 14:54

## 2021-11-25 RX ADMIN — SODIUM CHLORIDE 1000 ML: 9 INJECTION, SOLUTION INTRAVENOUS at 11:15

## 2021-11-25 RX ADMIN — DEXTROSE MONOHYDRATE 25 G: 25 INJECTION, SOLUTION INTRAVENOUS at 11:17

## 2021-11-25 RX ADMIN — INSULIN LISPRO 6 UNITS: 100 INJECTION, SOLUTION INTRAVENOUS; SUBCUTANEOUS at 21:29

## 2021-11-25 RX ADMIN — VANCOMYCIN HYDROCHLORIDE 2000 MG: 1 INJECTION, POWDER, LYOPHILIZED, FOR SOLUTION INTRAVENOUS at 15:54

## 2021-11-25 RX ADMIN — MORPHINE SULFATE 4 MG: 4 INJECTION INTRAVENOUS at 21:29

## 2021-11-25 RX ADMIN — BUDESONIDE AND FORMOTEROL FUMARATE DIHYDRATE 2 PUFF: 160; 4.5 AEROSOL RESPIRATORY (INHALATION) at 19:53

## 2021-11-25 RX ADMIN — Medication 3 MG: at 23:28

## 2021-11-25 RX ADMIN — MORPHINE SULFATE 4 MG: 4 INJECTION INTRAVENOUS at 17:44

## 2021-11-25 ASSESSMENT — ENCOUNTER SYMPTOMS
NAUSEA: 0
ABDOMINAL PAIN: 0
SHORTNESS OF BREATH: 0
DIARRHEA: 1
COLOR CHANGE: 1
BACK PAIN: 0
VOMITING: 0

## 2021-11-25 ASSESSMENT — PAIN SCALES - GENERAL
PAINLEVEL_OUTOF10: 8
PAINLEVEL_OUTOF10: 7
PAINLEVEL_OUTOF10: 8

## 2021-11-25 ASSESSMENT — PAIN DESCRIPTION - PAIN TYPE: TYPE: ACUTE PAIN

## 2021-11-25 NOTE — CONSULTS
Vancomycin Dosing by Pharmacy - Initial Note   TODAY'S DATE:  11/25/2021  Patient name, age:  Lizandro Rivera, 61 y.o. Consulting provider: Kellie Cobb    Indication: SSTI  Additional antimicrobials:  Rocephin    Actual Weight:    Wt Readings from Last 1 Encounters:   11/25/21 160 lb (72.6 kg)     Labs/Ancillary Data  Estimated Creatinine Clearance: 76 mL/min (based on SCr of 1.01 mg/dL). Recent Labs     11/23/21  0720 11/25/21  0926   CREATININE 1.05 1.01   BUN 27* 30*   WBC 7.0 7.2     Procalcitonin   Date Value Ref Range Status   05/25/2021 0.13 (H) <0.09 ng/mL Final     Comment:           Suspected Sepsis:  <0.50 ng/mL     Low likelihood of sepsis. 0.50-2.00 ng/mL     Increased likelihood of sepsis. Antibiotics encouraged. >2.00 ng/mL     High risk of sepsis/shock. Antibiotics strongly encouraged. Suspected Lower Resp Tract Infections:  <0.24 ng/mL     Low likelihood of bacterial infection. >0.24 ng/mL     Increased likelihood of bacterial infection. Antibiotics encouraged. With successful antibiotic therapy, PCT levels should decrease rapidly. (Half-life of 24 to   36 hours.)        Procalcitonin values from samples collected within the first 6 hours of systemic infection   may still be low. Retesting may be indicated. Values from day 1 and day 4 can be entered into the Change in Procalcitonin Calculator   (www.[employer]-pct-calculator. [place]) to determine the patient's Mortality Risk Prognosis        In healthy neonates, plasma Procalcitonin (PCT) concentrations increase gradually after   birth, reaching peak values at about 24 hours of age then decrease to normal values below   0.5 ng/mL by 48-72 hours of age. Temp: 97.9F        MRSA Nasal Swab: N/A. Non-respiratory infection. Lucio Shannon PLAN   Initial loading dose of 25mg/kg (max of 2,500 mg) = 2000  mg  x 1, then  vancomycin 750 mg IV every 12 hours. Dosing based off of previous regimen from last admission.    Ensured BUN/sCr ordered at baseline and every 48 hours x at least 3 levels, then at least weekly. Vancomycin level ordered for 11/26 @ 0600. Will use bayesian method for dosing. This level will not be a trough. Target AUC/DARIAN 400-500, with trough goal estimate of 10-15. Vancomycin Target Concentration Parameters  Treatment  Population Target AUC/DARIAN Target Trough   Invasive MRSA Infection (bacteremia, pneumonia, meningitis, endocarditis, osteomyelitis)  Sepsis (undifferentiated) 400-600 N/A   Infection due to non-MRSA pathogen  Empiric treatment of non-invasive MRSA infection  (SSTI, UTI) <500 10-15 mg/L   CrCl < 29 mL/min  Rapidly fluctuating serum creatinine   LUMA N/A < 15 mg/L     Renal replacement therapy is dosed by levels, per hospital protocol. Abbreviations  * Pauc: probability that AUC is >400 (efficacy); Pconc: probability that Ctrough is above 20 ?g/mL (toxicity); Tox: Probability of nephrotoxicity, based on Marquis et al. Clin Infect Dis 2009. Thank you for the consult. Pharmacy will continue to follow.
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
## 2021-11-25 NOTE — RT PROTOCOL NOTE
RT Inhaler-Nebulizer Bronchodilator Protocol Note    There is a bronchodilator order in the chart from a provider indicating to follow the RT Bronchodilator Protocol and there is an Initiate RT Inhaler-Nebulizer Bronchodilator Protocol order as well (see protocol at bottom of note). CXR Findings:  No results found. The findings from the last RT Protocol Assessment were as follows:   History Pulmonary Disease: Chronic pulmonary disease  Respiratory Pattern: Regular pattern and RR 12-20 bpm  Breath Sounds: Clear breath sounds  Cough: Strong, spontaneous, non-productive  Indication for Bronchodilator Therapy: On home bronchodilators  Bronchodilator Assessment Score: 2    Aerosolized bronchodilator medication orders have been revised according to the RT Inhaler-Nebulizer Bronchodilator Protocol below. Respiratory Therapist to perform RT Therapy Protocol Assessment initially then follow the protocol. Repeat RT Therapy Protocol Assessment PRN for score 0-3 or on second treatment, BID, and PRN for scores above 3. No Indications - adjust the frequency to every 6 hours PRN wheezing or bronchospasm, if no treatments needed after 48 hours then discontinue using Per Protocol order mode. If indication present, adjust the RT bronchodilator orders based on the Bronchodilator Assessment Score as indicated below. Use Inhaler orders unless patient has one or more of the following: on home nebulizer, not able to hold breath for 10 seconds, is not alert and oriented, cannot activate and use MDI correctly, or respiratory rate 25 breaths per minute or more, then use the equivalent nebulizer order(s) with same Frequency and PRN reasons based on the score. If a patient is on this medication at home then do not decrease Frequency below that used at home.     0-3 - enter or revise RT bronchodilator order(s) to equivalent RT Bronchodilator order with Frequency of every 4 hours PRN for wheezing or increased work of breathing using Per Protocol order mode. 4-6 - enter or revise RT Bronchodilator order(s) to two equivalent RT bronchodilator orders with one order with BID Frequency and one order with Frequency of every 4 hours PRN wheezing or increased work of breathing using Per Protocol order mode. 7-10 - enter or revise RT Bronchodilator order(s) to two equivalent RT bronchodilator orders with one order with TID Frequency and one order with Frequency of every 4 hours PRN wheezing or increased work of breathing using Per Protocol order mode. 11-13 - enter or revise RT Bronchodilator order(s) to one equivalent RT bronchodilator order with QID Frequency and an Albuterol order with Frequency of every 4 hours PRN wheezing or increased work of breathing using Per Protocol order mode. Greater than 13 - enter or revise RT Bronchodilator order(s) to one equivalent RT bronchodilator order with every 4 hours Frequency and an Albuterol order with Frequency of every 2 hours PRN wheezing or increased work of breathing using Per Protocol order mode. RT to enter RT Home Evaluation for COPD & MDI Assessment order using Per Protocol order mode.     Electronically signed by Ching Mcbrdie RCP on 11/25/2021 at 1:09 PM

## 2021-11-25 NOTE — H&P
[facility]  Office: [address], DO, Emmaalberto Her, DO, Lorrie Joshua, DO, Elizabeth Daniel Chiu, DO, Yue Alicea MD, Poppy Abebe MD, Jinny Gibbs MD, Benito Gong MD, Baldemar Mcfarland MD, Gale Hayden MD, Ariana Gamez MD, Chery Faith DO, Erin Emmanuel DO, Jessica Pastor MD,  Leydi Snyder DO, Rosy Schirmer, MD, Jaquelin Fagan MD, Kristen Reyes MD, Ana Maria Mcintosh MD, Andrew Ribera MD, Janna Simmons MD, Velta Bernheim, MD, Ginger Rai [facility], [facility], CNP, Antonio Pina, CNP, Brandan Ramos, CNS, Kirk Leon, CNP, Amalia Bertrand, CNP, Josey Carrion, CNP, Fiona Reyes, CNP, Rafal Infante, CNP, Samantha Carver PA-C, Freddy Bejarano, ANIYAH, Latanya Escoto, CNP, Pattie Washburn, CNP, Danyell Levin, CNP, Virginie Duggan, CNP, Lizzie Carlson, CNP, Ben Rocha, CNP, Danika Garcia, CNP         [facility]    HISTORY AND PHYSICAL EXAMINATION            Date:   11/25/2021  Patient name:  Beverly Tay  Date of admission:  11/25/2021  9:10 AM  MRN:   [id number]  Account:  [de-identified]  YOB: 1955  PCP:    MARCELO Murcia CNP  Room:   [place] [address]  Code Status:    Prior    Chief Complaint:     Chief Complaint   Patient presents with    Other     abnormal labs     History Obtained From:     patient, electronic medical record    History of Present Illness:     Beverly Tay is a 61-year-old  gentleman who presented to [facility] on 11/25/2021 for evaluation of hyperkalemia. Patient recently had a prolonged hospitalization and was recently discharged on 11/13/2021 after infection of his left BKA stump. He was discharged at that time on ceftaroline. Please been residing at a skilled nursing facility since then. Laboratory studies were drawn in the outpatient setting he was noted to be hyperkalemic. He was given IBETH [facility], but repeat labs revealed persistent hyperkalemia.   He was Faisal Demarcus at [address] Right 11/03/2016    I & D heel    FOOT SURGERY Right 12/31/2016    I & D    FRACTURE SURGERY Left 9/5/2015    humerus left, left leg    HC CATH POWER PICC SINGLE  9/2/2021         IR INS PICC VAD W SQ PORT GREATER THAN 5  11/6/2020    IR INS PICC VAD W SQ PORT GREATER THAN 5 11/6/2020 [facility]    IR INS PICC VAD W SQ PORT GREATER THAN 5  1/11/2021    IR INS PICC VAD W SQ PORT GREATER THAN 5 1/11/2021 [facility]    IR INS PICC VAD W SQ PORT GREATER THAN 5  4/8/2021    IR INS PICC VAD W SQ PORT GREATER THAN 5 4/8/2021 [facility]    LEG AMPUTATION BELOW KNEE Right 01/21/2017    LEG AMPUTATION BELOW KNEE Left 2/9/2021    LEFT  LEG AMPUTATION BELOW KNEE performed by Boy Adams MD at [address] [id number] Left 4/6/2021    STUMP DEBRIDEMENT INCISION AND DRAINAGE performed by Boy Adams MD at [address] [place] [address] Right 2014    rt 3rd through 5th digits    TOE AMPUTATION Left 5/26/2016    left foot 5th toe    TOE AMPUTATION Left 8/5/2020    FOOT TRANSMETATARSAL  AMPUTATION - AND LEFT PECUTANEOUS TENDO ACHILLES LENGTHENING performed by Jevon Humphrey DPM at [address] [place] [address] TOE AMPUTATION Left 8/24/2020    REVISION  TRANSMETATARSAL AMPUTATION WITH DEBRIDEMENT. performed by Jevon Humphrey DPM at [facility]      5/14/13- with dilation    VASCULAR SURGERY Right 01/16/2017    foot guillotine amputation        Medications Prior to Admission:     Prior to Admission medications    Medication Sig Start Date End Date Taking?  Authorizing Provider   insulin lispro (HUMALOG) 100 UNIT/ML injection vial Inject 0-12 Units into the skin 3 times daily (with meals) 11/12/21   Charlee Robison MD   ceftaroline fosamil (TEFLARO) 600 MG SOLR Infuse 600 mg intravenously every 12 hours for 28 days 11/9/21 12/7/21  Moustapha Strickland MD aspirin EC 81 MG EC tablet Take 81 mg by mouth daily    Historical Provider, MD   apixaban (ELIQUIS) 5 MG TABS tablet Take 1 tablet by mouth 2 times daily 10/5/21   MARCELO Harrison CNP   insulin detemir (LEVEMIR) 100 UNIT/ML injection vial Inject 25 units, subcutaenously daily with diner 10/5/21   MARCELO Harrison CNP   famotidine (PEPCID) 20 MG tablet Take 1 tablet by mouth 2 times daily 10/5/21   MARCELO Harrison CNP   amitriptyline (ELAVIL) 75 MG tablet Take 1 tablet by mouth nightly 10/5/21   MARCELO Harrison CNP   metFORMIN (GLUCOPHAGE) 500 MG tablet Take 1 tablet by mouth 2 times daily (with meals) 10/5/21   MARCELO Harrison CNP   Lancets MISC 1 each by Does not apply route 3 times daily 10/5/21   MARCELO Harrison CNP   ferrous sulfate (IRON 325) 325 (65 Fe) MG tablet Take 1 tablet by mouth 2 times daily 10/5/21   MARCELO Harrison CNP   metoprolol tartrate (LOPRESSOR) 25 MG tablet Take 1 tablet by mouth 2 times daily Hold for heart rate less than 60 or systolic blood pressure less than 100 10/5/21   MARCELO Harrison CNP   simethicone (MYLICON) 80 MG chewable tablet Take 1 tablet by mouth every 6 hours as needed for Flatulence 10/5/21   MARCELO Harrison CNP   hydrOXYzine (VISTARIL) 25 MG capsule Take 1 capsule by mouth 3 times daily as needed for Anxiety 10/5/21   MARCELO Harrison CNP   naloxegol (MOVANTIK) 25 MG TABS tablet Take 1 tablet by mouth every morning 10/5/21   MARCELO Harrison CNP   budesonide-formoterol (SYMBICORT) 160-4.5 MCG/ACT AERO Inhale 2 puffs into the lungs 2 times daily  Patient taking differently: Inhale 2 puffs into the lungs 2 times daily Pt reports he ran out of it.  Will need a refill upon d/c 10/5/21   MARCELO Harrison CNP   atorvastatin (LIPITOR) 10 MG tablet Take 10 mg by mouth nightly  8/9/21   Historical Provider, MD   glucose monitoring (FREESTYLE) kit 1 kit by Does not apply route daily 9/24/21   Chato Liu APRN - CNP   docusate sodium (COLACE) 100 MG capsule Take 1 capsule by mouth 2 times daily 9/17/21   Chantal Pineda DO   Multiple Vitamins-Minerals (THERAPEUTIC MULTIVITAMIN-MINERALS) tablet Take 1 tablet by mouth daily    Historical Provider, MD   aluminum & magnesium hydroxide-simethicone (MAALOX) 200-200-20 MG/5ML SUSP suspension Take 10 mLs by mouth every 6 hours as needed for Indigestion     Historical Provider, MD   bisacodyl (DULCOLAX) 5 MG EC tablet Take 1 tablet by mouth daily as needed for Constipation 4/9/21   Lan Rojas DO   tamsulosin (FLOMAX) 0.4 MG capsule Take 1 capsule by mouth daily  Patient taking differently: Take 0.4 mg by mouth nightly  4/10/21   Lan Rojas DO   lactobacillus (BACID) TABS Take 1 tablet by mouth 2 times daily 1/11/21   Scout Jacques MD   albuterol sulfate HFA (VENTOLIN HFA) 108 (90 Base) MCG/ACT inhaler Inhale 2 puffs into the lungs every 6 hours as needed for Wheezing    Historical Provider, MD        Allergies:     Gabapentin and Other    Social History:     Tobacco:    reports that he quit smoking about 12 months ago. His smoking use included cigarettes. He has a 30.00 pack-year smoking history. He quit smokeless tobacco use about 41 years ago. His smokeless tobacco use included chew. Alcohol:      reports current alcohol use. Drug Use:  reports previous drug use. Drug: Marijuana Uriel Chavis). Family History:     Family History   Problem Relation Age of Onset    Diabetes Mother     Cancer Mother     Alcohol Abuse Father     Cancer Sister     Alcohol Abuse Maternal Aunt     Alcohol Abuse Maternal Uncle     Alcohol Abuse Paternal Aunt        Review of Systems:     Positive and Negative as described in HPI.     CONSTITUTIONAL:  negative for fevers, chills, sweats, fatigue, weight loss  HEENT:  negative for vision, hearing changes, runny nose, throat pain  RESPIRATORY:  negative for shortness of breath, cough, congestion, wheezing  CARDIOVASCULAR:  negative for chest pain, palpitations  GASTROINTESTINAL:  negative for nausea, vomiting, diarrhea, constipation, change in bowel habits, abdominal pain   GENITOURINARY:  negative for difficulty of urination, burning with urination, frequency   INTEGUMENT:  negative for rash, skin lesions, easy bruising   HEMATOLOGIC/LYMPHATIC:  negative for swelling/edema   ALLERGIC/IMMUNOLOGIC:  negative for urticaria , itching  ENDOCRINE:  negative increase in drinking, increase in urination, hot or cold intolerance  MUSCULOSKELETAL:  negative joint pains, muscle aches, swelling of joints; reports left stump pain  NEUROLOGICAL:  negative for headaches, dizziness, lightheadedness, numbness, pain, tingling extremities  BEHAVIOR/PSYCH:  negative for depression, anxiety    Physical Exam:   BP (!) 137/115   Pulse 94   Temp 97.9 °F (36.6 °C) (Oral)   Resp 25   Ht 6' 1\" (1.854 m)   Wt 160 lb (72.6 kg)   SpO2 94%   BMI 21.11 kg/m²   Temp (24hrs), Av.9 °F (36.6 °C), Min:97.9 °F (36.6 °C), Max:97.9 °F (36.6 °C)    Recent Labs     21  1114 21  1217 21  1254   POCGLU 159* 119* 71*       Intake/Output Summary (Last 24 hours) at 2021 1550  Last data filed at 2021 1219  Gross per 24 hour   Intake 1000 ml   Output 200 ml   Net 800 ml       General Appearance:  alert, well appearing, and in no acute distress, chronically ill-appearing  gentleman sitting up in bed  Mental status: oriented to person, place, and time  Head:  normocephalic, atraumatic  Eye: no icterus, redness, pupils equal and reactive, extraocular eye movements intact, conjunctiva clear  Ear: normal external ear, no discharge, hearing intact  Nose:  no drainage noted  Mouth: mucous membranes moist  Neck: supple, no carotid bruits, thyroid not palpable  Lungs: Bilateral equal air entry, clear to auscultation, no wheezing, rales or rhonchi, normal effort  Cardiovascular: normal rate, regular rhythm, no murmur, gallop, rub. Abdomen: Soft, nontender, nondistended, normal bowel sounds, no hepatomegaly or splenomegaly  Neurologic: There are no new focal motor or sensory deficits, normal muscle tone and bulk, no abnormal sensation, normal speech, cranial nerves II through XII grossly intact  Skin: No gross lesions, rashes, bruising or bleeding on exposed skin area  Extremities:  peripheral pulses palpable, no pedal edema or calf pain with palpation, bilateral AKA's with multiple skin ulcerations present. Large left eschar on the left stump.   Psych: normal affect     Investigations:      Laboratory Testing:  Recent Results (from the past 24 hour(s))   Potassium    Collection Time: 11/25/21  1:45 AM   Result Value Ref Range    Potassium 6.7 (HH) 3.7 - 5.3 mmol/L   CBC with DIFF    Collection Time: 11/25/21  9:26 AM   Result Value Ref Range    WBC 7.2 3.5 - 11.3 k/uL    RBC 4.16 (L) 4.21 - 5.77 m/uL    Hemoglobin 10.8 (L) 13.0 - 17.0 g/dL    Hematocrit 37.2 (L) 40.7 - 50.3 %    MCV 89.4 82.6 - 102.9 fL    MCH 26.0 25.2 - 33.5 pg    MCHC 29.0 28.4 - 34.8 g/dL    RDW 15.5 (H) 11.8 - 14.4 %    Platelets 336 346 - 896 k/uL    MPV 9.3 8.1 - 13.5 fL    NRBC Automated 0.0 0.0 per 100 WBC    Differential Type NOT REPORTED     Seg Neutrophils 59 36 - 65 %    Lymphocytes 25 24 - 43 %    Monocytes 11 3 - 12 %    Eosinophils % 4 1 - 4 %    Basophils 1 0 - 2 %    Immature Granulocytes 0 0 %    Segs Absolute 4.25 1.50 - 8.10 k/uL    Absolute Lymph # 1.80 1.10 - 3.70 k/uL    Absolute Mono # 0.76 0.10 - 1.20 k/uL    Absolute Eos # 0.27 0.00 - 0.44 k/uL    Basophils Absolute 0.08 0.00 - 0.20 k/uL    Absolute Immature Granulocyte 0.03 0.00 - 0.30 k/uL    WBC Morphology NOT REPORTED     RBC Morphology ANISOCYTOSIS PRESENT     Platelet Estimate NOT REPORTED    Comprehensive Metabolic Panel w/ Reflex to MG    Collection Time: 11/25/21  9:26 AM   Result Value Ref Range    Glucose 176 (H) 70 - 99 mg/dL    BUN 30 (H) 8 - 23 mg/dL CREATININE 1.01 0.70 - 1.20 mg/dL    Bun/Cre Ratio 30 (H) 9 - 20    Calcium 8.9 8.6 - 10.4 mg/dL    Sodium 138 135 - 144 mmol/L    Potassium 5.8 (H) 3.7 - 5.3 mmol/L    Chloride 109 (H) 98 - 107 mmol/L    CO2 18 (L) 20 - 31 mmol/L    Anion Gap 11 9 - 17 mmol/L    Alkaline Phosphatase 127 40 - 129 U/L    ALT 6 5 - 41 U/L    AST 8 <40 U/L    Total Bilirubin 0.11 (L) 0.3 - 1.2 mg/dL    Total Protein 7.6 6.4 - 8.3 g/dL    Albumin 3.2 (L) 3.5 - 5.2 g/dL    Albumin/Globulin Ratio NOT REPORTED 1.0 - 2.5    GFR Non-African American >60 >60 mL/min    GFR African American >60 >60 mL/min    GFR Comment          GFR Staging NOT REPORTED    Lactate, Sepsis    Collection Time: 11/25/21  9:26 AM   Result Value Ref Range    Lactic Acid, Sepsis 0.9 0.5 - 1.9 mmol/L    Lactic Acid, Sepsis, Whole Blood NOT REPORTED 0.5 - 1.9 mmol/L   APTT    Collection Time: 11/25/21  9:26 AM   Result Value Ref Range    PTT 31.9 23.9 - 33.8 sec   PROTIME-INR    Collection Time: 11/25/21  9:26 AM   Result Value Ref Range    Protime 13.8 11.5 - 14.2 sec    INR 1.1    POC Glucose Fingerstick    Collection Time: 11/25/21 11:14 AM   Result Value Ref Range    POC Glucose 159 (H) 75 - 110 mg/dL   POCT Glucose    Collection Time: 11/25/21 11:15 AM   Result Value Ref Range    Glucose 159 mg/dL    QC OK?  yes    EKG 12 Lead    Collection Time: 11/25/21 11:15 AM   Result Value Ref Range    Ventricular Rate 74 BPM    Atrial Rate 74 BPM    P-R Interval 136 ms    QRS Duration 82 ms    Q-T Interval 360 ms    QTc Calculation (Bazett) 399 ms    P Axis 54 degrees    R Axis 28 degrees    T Axis 51 degrees   Urinalysis with Microscopic    Collection Time: 11/25/21 11:34 AM   Result Value Ref Range    Color, UA Yellow Yellow    Turbidity UA SLIGHTLY CLOUDY (A) Clear    Glucose, Ur NEGATIVE NEGATIVE    Bilirubin Urine NEGATIVE NEGATIVE    Ketones, Urine NEGATIVE NEGATIVE    Specific Gravity, UA 1.023 1.005 - 1.030    Urine Hgb NEGATIVE NEGATIVE    pH, UA 5.0 5.0 - 8.0 Protein, UA 1+ (A) NEGATIVE    Urobilinogen, Urine Normal Normal    Nitrite, Urine NEGATIVE NEGATIVE    Leukocyte Esterase, Urine NEGATIVE NEGATIVE    Urinalysis Comments NOT REPORTED     -          WBC, UA 0 TO 2 0 - 5 /HPF    RBC, UA None 0 - 2 /HPF    Casts UA NOT REPORTED /LPF    Crystals, UA NOT REPORTED None /HPF    Epithelial Cells UA 0 TO 2 0 - 5 /HPF    Renal Epithelial, UA NOT REPORTED 0 /HPF    Bacteria, UA NOT REPORTED None    Mucus, UA NOT REPORTED None    Trichomonas, UA NOT REPORTED None    Amorphous, UA NOT REPORTED None    Other Observations UA NOT REPORTED NOT REQ. Yeast, UA NOT REPORTED None   POC Glucose Fingerstick    Collection Time: 11/25/21 12:17 PM   Result Value Ref Range    POC Glucose 119 (H) 75 - 110 mg/dL   POCT Glucose    Collection Time: 11/25/21 12:19 PM   Result Value Ref Range    Glucose 119 mg/dL    QC OK? yes    POC Glucose Fingerstick    Collection Time: 11/25/21 12:54 PM   Result Value Ref Range    POC Glucose 71 (L) 75 - 110 mg/dL   Basic Metabolic Panel w/ Reflex to MG    Collection Time: 11/25/21  2:09 PM   Result Value Ref Range    Glucose 83 70 - 99 mg/dL    BUN 26 (H) 8 - 23 mg/dL    CREATININE 0.88 0.70 - 1.20 mg/dL    Bun/Cre Ratio 30 (H) 9 - 20    Calcium 8.9 8.6 - 10.4 mg/dL    Sodium 136 135 - 144 mmol/L    Potassium 4.8 3.7 - 5.3 mmol/L    Chloride 107 98 - 107 mmol/L    CO2 21 20 - 31 mmol/L    Anion Gap 8 (L) 9 - 17 mmol/L    GFR Non-African American >60 >60 mL/min    GFR African American >60 >60 mL/min    GFR Comment          GFR Staging NOT REPORTED        Imaging/Diagnostics:  XR CHEST 1 VIEW    Result Date: 11/25/2021  Linear opacities in the right lung base compatible with subsegmental atelectasis. No consolidation identified.        Assessment :      Hospital Problems           Last Modified POA    * (Principal) Hyperkalemia 11/25/2021 Yes    COPD without exacerbation ([address] 11/25/2021 Yes    S/P BKA (below knee amputation) bilateral ([address] 11/25/2021 Yes Essential hypertension 11/25/2021 Yes    Type 2 diabetes mellitus with diabetic polyneuropathy, with long-term current use of insulin ([facility] 11/25/2021 Yes    Diabetic polyneuropathy ([facility] 11/25/2021 Yes    Cellulitis of left lower extremity 11/25/2021 Yes          Plan:     Patient status observation in the Med/Surge    I believe this hyperkalemia may be an adverse effect of the ceftaroline. Continue lokelma overnight  ID consult for possible adjustment of antibiotic therapy  Continue all other home medications  Add IV morphine for breakthrough pain  Check BMP in the morning  Hopeful for discharge if BMP is improved tomorrow morning.     Consultations:   IP CONSULT TO HOSPITALIST  IP CONSULT TO INFECTIOUS DISEASES  PHARMACY TO DOSE VANCOMYCIN    33 Sveta Zayas DO  11/25/2021  3:50 PM    Copy sent to Dr. Park Sanchez, APRN - CNP

## 2021-11-25 NOTE — PLAN OF CARE
Problem: Falls - Risk of:  Goal: Will remain free from falls  Description: Will remain free from falls  Outcome: Ongoing  Goal: Absence of physical injury  Description: Absence of physical injury  Outcome: Ongoing     Problem: Skin Integrity:  Goal: Will show no infection signs and symptoms  Description: Will show no infection signs and symptoms  Outcome: Ongoing  Goal: Absence of new skin breakdown  Description: Absence of new skin breakdown  Outcome: Ongoing     Problem: Physical Regulation:  Goal: Will remain free from infection  Description: Will remain free from infection  Outcome: Ongoing     Problem: Respiratory:  Goal: Ability to maintain normal respiratory secretions will improve  Description: Ability to maintain normal respiratory secretions will improve  Outcome: Ongoing     Problem: Metabolic:  Goal: Ability to maintain appropriate glucose levels will improve  Description: Ability to maintain appropriate glucose levels will improve  Outcome: Ongoing

## 2021-11-25 NOTE — PROGRESS NOTES
Pt admitted to room 2024 from ER  Oriented to room and call light/tv controls. Bed in lowest position, wheels locked, 2/4 side rails up  Call light in reach, room free of clutter, adequate lighting provided.   Electronically signed by Jaqueline Bautista RN on 11/25/2021 at 6:11 PM

## 2021-11-25 NOTE — ED TRIAGE NOTES
Pt [facility] with c/o of abnormal labs. Pt only complaint is leg pain from a recent below the knee amputation and bottom pain. NAD. RR even and unlabored.
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
## 2021-11-26 PROBLEM — E87.5 HYPERKALEMIA: Status: RESOLVED | Noted: 2021-11-25 | Resolved: 2021-11-26

## 2021-11-26 PROBLEM — E87.5 HYPERKALEMIA: Status: ACTIVE | Noted: 2021-11-26

## 2021-11-26 LAB
ABSOLUTE EOS #: 0.37 K/UL (ref 0–0.44)
ABSOLUTE IMMATURE GRANULOCYTE: 0.04 K/UL (ref 0–0.3)
ABSOLUTE LYMPH #: 1.41 K/UL (ref 1.1–3.7)
ABSOLUTE MONO #: 0.64 K/UL (ref 0.1–1.2)
ANION GAP SERPL CALCULATED.3IONS-SCNC: 11 MMOL/L (ref 9–17)
BASOPHILS # BLD: 1 % (ref 0–2)
BASOPHILS ABSOLUTE: 0.08 K/UL (ref 0–0.2)
BUN BLDV-MCNC: 22 MG/DL (ref 8–23)
BUN/CREAT BLD: 23 (ref 9–20)
C DIFFICILE TOXINS, PCR: NORMAL
CALCIUM SERPL-MCNC: 8.8 MG/DL (ref 8.6–10.4)
CHLORIDE BLD-SCNC: 108 MMOL/L (ref 98–107)
CO2: 22 MMOL/L (ref 20–31)
CREAT SERPL-MCNC: 0.97 MG/DL (ref 0.7–1.2)
CULTURE: NO GROWTH
DIFFERENTIAL TYPE: ABNORMAL
EKG ATRIAL RATE: 74 BPM
EKG P AXIS: 54 DEGREES
EKG P-R INTERVAL: 136 MS
EKG Q-T INTERVAL: 360 MS
EKG QRS DURATION: 82 MS
EKG QTC CALCULATION (BAZETT): 399 MS
EKG R AXIS: 28 DEGREES
EKG T AXIS: 51 DEGREES
EKG VENTRICULAR RATE: 74 BPM
EOSINOPHILS RELATIVE PERCENT: 5 % (ref 1–4)
GFR AFRICAN AMERICAN: >60 ML/MIN
GFR NON-AFRICAN AMERICAN: >60 ML/MIN
GFR SERPL CREATININE-BSD FRML MDRD: ABNORMAL ML/MIN/{1.73_M2}
GFR SERPL CREATININE-BSD FRML MDRD: ABNORMAL ML/MIN/{1.73_M2}
GLUCOSE BLD-MCNC: 130 MG/DL (ref 70–99)
GLUCOSE BLD-MCNC: 139 MG/DL (ref 75–110)
GLUCOSE BLD-MCNC: 143 MG/DL (ref 75–110)
GLUCOSE BLD-MCNC: 228 MG/DL (ref 75–110)
GLUCOSE BLD-MCNC: 246 MG/DL (ref 75–110)
HCT VFR BLD CALC: 33.1 % (ref 40.7–50.3)
HEMOGLOBIN: 9.7 G/DL (ref 13–17)
IMMATURE GRANULOCYTES: 1 %
INR BLD: 1.1
LYMPHOCYTES # BLD: 18 % (ref 24–43)
Lab: NORMAL
MCH RBC QN AUTO: 26 PG (ref 25.2–33.5)
MCHC RBC AUTO-ENTMCNC: 29.3 G/DL (ref 28.4–34.8)
MCV RBC AUTO: 88.7 FL (ref 82.6–102.9)
MONOCYTES # BLD: 8 % (ref 3–12)
NRBC AUTOMATED: 0 PER 100 WBC
PDW BLD-RTO: 15.5 % (ref 11.8–14.4)
PLATELET # BLD: 341 K/UL (ref 138–453)
PLATELET ESTIMATE: ABNORMAL
PMV BLD AUTO: 9.2 FL (ref 8.1–13.5)
POTASSIUM SERPL-SCNC: 4.6 MMOL/L (ref 3.7–5.3)
PROTHROMBIN TIME: 13.8 SEC (ref 11.5–14.2)
RBC # BLD: 3.73 M/UL (ref 4.21–5.77)
RBC # BLD: ABNORMAL 10*6/UL
SEG NEUTROPHILS: 67 % (ref 36–65)
SEGMENTED NEUTROPHILS ABSOLUTE COUNT: 5.16 K/UL (ref 1.5–8.1)
SODIUM BLD-SCNC: 141 MMOL/L (ref 135–144)
SPECIMEN DESCRIPTION: NORMAL
SPECIMEN DESCRIPTION: NORMAL
VANCOMYCIN RANDOM DATE LAST DOSE: NORMAL
VANCOMYCIN RANDOM DOSE AMOUNT: NORMAL
VANCOMYCIN RANDOM TIME LAST DOSE: NORMAL
VANCOMYCIN RANDOM: 21.1 UG/ML
WBC # BLD: 7.7 K/UL (ref 3.5–11.3)
WBC # BLD: ABNORMAL 10*3/UL

## 2021-11-26 PROCEDURE — 6360000002 HC RX W HCPCS: Performed by: INTERNAL MEDICINE

## 2021-11-26 PROCEDURE — 85610 PROTHROMBIN TIME: CPT

## 2021-11-26 PROCEDURE — 94761 N-INVAS EAR/PLS OXIMETRY MLT: CPT

## 2021-11-26 PROCEDURE — 99232 SBSQ HOSP IP/OBS MODERATE 35: CPT | Performed by: INTERNAL MEDICINE

## 2021-11-26 PROCEDURE — 6360000002 HC RX W HCPCS: Performed by: NURSE PRACTITIONER

## 2021-11-26 PROCEDURE — 2580000003 HC RX 258: Performed by: INTERNAL MEDICINE

## 2021-11-26 PROCEDURE — 96366 THER/PROPH/DIAG IV INF ADDON: CPT

## 2021-11-26 PROCEDURE — 1200000000 HC SEMI PRIVATE

## 2021-11-26 PROCEDURE — 93010 ELECTROCARDIOGRAM REPORT: CPT | Performed by: INTERNAL MEDICINE

## 2021-11-26 PROCEDURE — 6370000000 HC RX 637 (ALT 250 FOR IP): Performed by: NURSE PRACTITIONER

## 2021-11-26 PROCEDURE — 6370000000 HC RX 637 (ALT 250 FOR IP): Performed by: INTERNAL MEDICINE

## 2021-11-26 PROCEDURE — 82947 ASSAY GLUCOSE BLOOD QUANT: CPT

## 2021-11-26 PROCEDURE — 97530 THERAPEUTIC ACTIVITIES: CPT

## 2021-11-26 PROCEDURE — 85025 COMPLETE CBC W/AUTO DIFF WBC: CPT

## 2021-11-26 PROCEDURE — 36415 COLL VENOUS BLD VENIPUNCTURE: CPT

## 2021-11-26 PROCEDURE — 97162 PT EVAL MOD COMPLEX 30 MIN: CPT

## 2021-11-26 PROCEDURE — 96376 TX/PRO/DX INJ SAME DRUG ADON: CPT

## 2021-11-26 PROCEDURE — 80048 BASIC METABOLIC PNL TOTAL CA: CPT

## 2021-11-26 PROCEDURE — 94640 AIRWAY INHALATION TREATMENT: CPT

## 2021-11-26 PROCEDURE — 99217 PR OBSERVATION CARE DISCHARGE MANAGEMENT: CPT | Performed by: INTERNAL MEDICINE

## 2021-11-26 PROCEDURE — 80202 ASSAY OF VANCOMYCIN: CPT

## 2021-11-26 RX ORDER — LANOLIN ALCOHOL/MO/W.PET/CERES
3 CREAM (GRAM) TOPICAL NIGHTLY PRN
Status: DISCONTINUED | OUTPATIENT
Start: 2021-11-26 | End: 2021-12-03 | Stop reason: HOSPADM

## 2021-11-26 RX ORDER — DIPHENHYDRAMINE HYDROCHLORIDE 50 MG/ML
25 INJECTION INTRAMUSCULAR; INTRAVENOUS ONCE
Status: COMPLETED | OUTPATIENT
Start: 2021-11-26 | End: 2021-11-26

## 2021-11-26 RX ORDER — AMMONIUM LACTATE 12 G/100G
LOTION TOPICAL PRN
Status: DISCONTINUED | OUTPATIENT
Start: 2021-11-26 | End: 2021-11-29

## 2021-11-26 RX ORDER — PREGABALIN 75 MG/1
75 CAPSULE ORAL 3 TIMES DAILY
COMMUNITY
End: 2022-03-24 | Stop reason: SDUPTHER

## 2021-11-26 RX ORDER — CALCIUM CARBONATE 200(500)MG
500 TABLET,CHEWABLE ORAL 3 TIMES DAILY PRN
Status: DISCONTINUED | OUTPATIENT
Start: 2021-11-26 | End: 2021-12-03 | Stop reason: HOSPADM

## 2021-11-26 RX ADMIN — ACETAMINOPHEN 650 MG: 325 TABLET ORAL at 07:23

## 2021-11-26 RX ADMIN — APIXABAN 5 MG: 5 TABLET, FILM COATED ORAL at 20:20

## 2021-11-26 RX ADMIN — ASPIRIN 81 MG: 81 TABLET, COATED ORAL at 17:04

## 2021-11-26 RX ADMIN — METOPROLOL TARTRATE 25 MG: 25 TABLET, FILM COATED ORAL at 20:20

## 2021-11-26 RX ADMIN — SODIUM ZIRCONIUM CYCLOSILICATE 10 G: 10 POWDER, FOR SUSPENSION ORAL at 14:26

## 2021-11-26 RX ADMIN — SODIUM CHLORIDE, PRESERVATIVE FREE 10 ML: 5 INJECTION INTRAVENOUS at 12:13

## 2021-11-26 RX ADMIN — INSULIN GLARGINE 25 UNITS: 100 INJECTION, SOLUTION SUBCUTANEOUS at 17:04

## 2021-11-26 RX ADMIN — VANCOMYCIN HYDROCHLORIDE 750 MG: 750 INJECTION, POWDER, LYOPHILIZED, FOR SOLUTION INTRAVENOUS at 02:29

## 2021-11-26 RX ADMIN — MORPHINE SULFATE 4 MG: 4 INJECTION INTRAVENOUS at 07:23

## 2021-11-26 RX ADMIN — ATORVASTATIN CALCIUM 10 MG: 10 TABLET, FILM COATED ORAL at 20:19

## 2021-11-26 RX ADMIN — MORPHINE SULFATE 4 MG: 4 INJECTION INTRAVENOUS at 14:26

## 2021-11-26 RX ADMIN — MORPHINE SULFATE 4 MG: 4 INJECTION INTRAVENOUS at 22:36

## 2021-11-26 RX ADMIN — ACETAMINOPHEN 650 MG: 325 TABLET ORAL at 14:26

## 2021-11-26 RX ADMIN — TAMSULOSIN HYDROCHLORIDE 0.4 MG: 0.4 CAPSULE ORAL at 20:20

## 2021-11-26 RX ADMIN — MORPHINE SULFATE 4 MG: 4 INJECTION INTRAVENOUS at 10:09

## 2021-11-26 RX ADMIN — FAMOTIDINE 20 MG: 20 TABLET ORAL at 20:20

## 2021-11-26 RX ADMIN — MORPHINE SULFATE 4 MG: 4 INJECTION INTRAVENOUS at 02:28

## 2021-11-26 RX ADMIN — DOCUSATE SODIUM 100 MG: 100 CAPSULE, LIQUID FILLED ORAL at 20:20

## 2021-11-26 RX ADMIN — FAMOTIDINE 20 MG: 20 TABLET ORAL at 07:23

## 2021-11-26 RX ADMIN — SODIUM ZIRCONIUM CYCLOSILICATE 10 G: 10 POWDER, FOR SUSPENSION ORAL at 22:35

## 2021-11-26 RX ADMIN — APIXABAN 5 MG: 5 TABLET, FILM COATED ORAL at 12:11

## 2021-11-26 RX ADMIN — PROBIOTIC PRODUCT - TAB 1 TABLET: TAB at 20:20

## 2021-11-26 RX ADMIN — INSULIN LISPRO 6 UNITS: 100 INJECTION, SOLUTION INTRAVENOUS; SUBCUTANEOUS at 17:04

## 2021-11-26 RX ADMIN — CEFTRIAXONE SODIUM 1000 MG: 1 INJECTION, POWDER, FOR SOLUTION INTRAMUSCULAR; INTRAVENOUS at 14:27

## 2021-11-26 RX ADMIN — BUDESONIDE AND FORMOTEROL FUMARATE DIHYDRATE 2 PUFF: 160; 4.5 AEROSOL RESPIRATORY (INHALATION) at 09:44

## 2021-11-26 RX ADMIN — FERROUS SULFATE TAB EC 325 MG (65 MG FE EQUIVALENT) 325 MG: 325 (65 FE) TABLET DELAYED RESPONSE at 20:19

## 2021-11-26 RX ADMIN — Medication 3 MG: at 23:00

## 2021-11-26 RX ADMIN — MULTIPLE VITAMINS W/ MINERALS TAB 1 TABLET: TAB at 17:04

## 2021-11-26 RX ADMIN — INSULIN LISPRO 3 UNITS: 100 INJECTION, SOLUTION INTRAVENOUS; SUBCUTANEOUS at 20:33

## 2021-11-26 RX ADMIN — INSULIN LISPRO 3 UNITS: 100 INJECTION, SOLUTION INTRAVENOUS; SUBCUTANEOUS at 12:12

## 2021-11-26 RX ADMIN — Medication: at 20:21

## 2021-11-26 RX ADMIN — MORPHINE SULFATE 4 MG: 4 INJECTION INTRAVENOUS at 17:03

## 2021-11-26 RX ADMIN — BUDESONIDE AND FORMOTEROL FUMARATE DIHYDRATE 2 PUFF: 160; 4.5 AEROSOL RESPIRATORY (INHALATION) at 19:41

## 2021-11-26 RX ADMIN — SODIUM ZIRCONIUM CYCLOSILICATE 10 G: 10 POWDER, FOR SUSPENSION ORAL at 07:26

## 2021-11-26 RX ADMIN — VANCOMYCIN HYDROCHLORIDE 750 MG: 750 INJECTION, POWDER, LYOPHILIZED, FOR SOLUTION INTRAVENOUS at 15:48

## 2021-11-26 RX ADMIN — DIPHENHYDRAMINE HYDROCHLORIDE 25 MG: 50 INJECTION, SOLUTION INTRAMUSCULAR; INTRAVENOUS at 20:21

## 2021-11-26 RX ADMIN — METFORMIN HYDROCHLORIDE 500 MG: 500 TABLET ORAL at 17:04

## 2021-11-26 RX ADMIN — AMITRIPTYLINE HYDROCHLORIDE 75 MG: 50 TABLET, FILM COATED ORAL at 20:21

## 2021-11-26 RX ADMIN — MORPHINE SULFATE 4 MG: 4 INJECTION INTRAVENOUS at 12:12

## 2021-11-26 RX ADMIN — MORPHINE SULFATE 4 MG: 4 INJECTION INTRAVENOUS at 20:34

## 2021-11-26 ASSESSMENT — PAIN SCALES - GENERAL
PAINLEVEL_OUTOF10: 8
PAINLEVEL_OUTOF10: 8
PAINLEVEL_OUTOF10: 9
PAINLEVEL_OUTOF10: 8
PAINLEVEL_OUTOF10: 0
PAINLEVEL_OUTOF10: 7
PAINLEVEL_OUTOF10: 8
PAINLEVEL_OUTOF10: 7
PAINLEVEL_OUTOF10: 7
PAINLEVEL_OUTOF10: 8
PAINLEVEL_OUTOF10: 8

## 2021-11-26 ASSESSMENT — PAIN DESCRIPTION - ORIENTATION
ORIENTATION: LEFT

## 2021-11-26 ASSESSMENT — PAIN DESCRIPTION - DESCRIPTORS
DESCRIPTORS: ACHING;DISCOMFORT

## 2021-11-26 ASSESSMENT — PAIN - FUNCTIONAL ASSESSMENT
PAIN_FUNCTIONAL_ASSESSMENT: PREVENTS OR INTERFERES SOME ACTIVE ACTIVITIES AND ADLS

## 2021-11-26 ASSESSMENT — PAIN DESCRIPTION - ONSET
ONSET: ON-GOING

## 2021-11-26 ASSESSMENT — PAIN DESCRIPTION - FREQUENCY
FREQUENCY: CONTINUOUS

## 2021-11-26 ASSESSMENT — PAIN DESCRIPTION - LOCATION
LOCATION: LEG

## 2021-11-26 ASSESSMENT — PAIN DESCRIPTION - PAIN TYPE
TYPE: ACUTE PAIN

## 2021-11-26 ASSESSMENT — ENCOUNTER SYMPTOMS
GASTROINTESTINAL NEGATIVE: 1
ALLERGIC/IMMUNOLOGIC NEGATIVE: 1
RESPIRATORY NEGATIVE: 1

## 2021-11-26 ASSESSMENT — PAIN DESCRIPTION - PROGRESSION
CLINICAL_PROGRESSION: NOT CHANGED

## 2021-11-26 NOTE — CARE COORDINATION
Social Work -[facility] states that [facility] will extend the auth through today. [facility] will transport at 730. Patient is agreeable with dc plans. Orders faxed. Nurse to call report to [phone].  Nancy Paiz

## 2021-11-26 NOTE — DISCHARGE SUMMARY
[facility]  Office: [address], DO, Juan Carlos Yates, DO, Suad Fonseca, DO, Isrrael Chiu, DO, Teresa Adams MD, Rose Ponce MD, Bri Lea MD, Marshall Canseco MD, Santhosh Jones MD, Nya Rivera MD, Angelica Jiang MD, Gisell Rodríguez, DO, Moustapha Jiang DO, Chante Wallace MD,  Claudetta Shilling, DO, Xiomara Omer MD, Demetria Lea MD, Emeli Wood MD, Allyn Loredo MD, Kong Polanco MD, Guy Tran MD, Nancy Christine MD, Zain Negron, [facility], CNP, Georgia Yoo, CNP, Adri Steiner, CNS, Mike Beth, CNP, Marilyn Cristina, [facility], Jacqueline Toledo, CNP, Deisy Mckeon, CNP, Marc Zafar, CNP, Loc Mcmillan PA-C, Javi Choe, [facility], Donald Vasquez, CNP, Sravani Arnold, CNP, Nicolle Beckham, CNP, Carla Membreno, CNP, Ronald Robert, CNP, Isaias Medina, CNP, Gail [facility]    Discharge Summary     Patient ID: Russell Everett  :  1955   MRN: [id number]     ACCOUNT:  [de-identified]   Patient's PCP: MARCELO Breen CNP  Admit Date: 2021   Discharge Date: 2021    Length of Stay: 0  Code Status:  Full Code  Admitting Physician: Vivien Carlisle DO  Discharge Physician: Kristyn Weiss DO     Active Discharge Diagnoses:     Hospital Problem Lists:  Principal Problem (Resolved):     Hyperkalemia  Active Problems:    COPD without exacerbation (HCC)    S/P BKA (below knee amputation) bilateral ([facility]    Essential hypertension    Type 2 diabetes mellitus with diabetic polyneuropathy, with long-term current use of insulin (HCC)    Diabetic polyneuropathy ([facility]    Cellulitis of left lower extremity      Admission Condition:  fair     Discharged Condition: fair    Hospital Stay:     Hospital Course:  Russell Everett is a 61 y.o. male who presented to [facility] on 2021 for evaluation of hyperkalemia.  Patient recently had a prolonged hospitalization and was recently discharged on 11/13/2021 after infection of his left BKA stump.  He was discharged at that time on ceftaroline.  Please been residing at a skilled nursing facility since then. June Conde studies were drawn in the outpatient setting he was noted to be hyperkalemic.  He was given Lokelma, but repeat labs revealed persistent hyperkalemia.  He was admitted to the hospital for further evaluation. Benito Major was given insulin, dextrose and calcium in the emergency department.  Potassium is improved.  Infectious diseases been consulted. Ceftaroline has been discontinued. He will not be maintained on Rocephin and vancomycin moving forward. At this time he is medically stable for discharge back to his acute rehab center.     Significant therapeutic interventions:   - Changing IV antibiotics  - Hyperkalemia treatment - insulin/dextrose  - Lokelma    Significant Diagnostic Studies:   Labs / Micro:  CBC:   Lab Results   Component Value Date    WBC 7.7 11/26/2021    RBC 3.73 11/26/2021    RBC 4.32 05/02/2012    HGB 9.7 11/26/2021    HCT 33.1 11/26/2021    MCV 88.7 11/26/2021    MCH 26.0 11/26/2021    MCHC 29.3 11/26/2021    RDW 15.5 11/26/2021     11/26/2021     05/02/2012     CMP:    Lab Results   Component Value Date    GLUCOSE 130 11/26/2021    GLUCOSE 129 05/02/2012     11/26/2021    K 4.6 11/26/2021     11/26/2021    CO2 22 11/26/2021    BUN 22 11/26/2021    CREATININE 0.97 11/26/2021    ANIONGAP 11 11/26/2021    ALKPHOS 127 11/25/2021    ALT 6 11/25/2021    AST 8 11/25/2021    BILITOT 0.11 11/25/2021    LABALBU 3.2 11/25/2021    LABALBU 4.4 03/05/2012    ALBUMIN NOT REPORTED 11/25/2021    LABGLOM >60 11/26/2021    GFRAA >60 11/26/2021    GFR      11/26/2021    GFR NOT REPORTED 11/26/2021    PROT 7.6 11/25/2021    CALCIUM 8.8 11/26/2021       Radiology:  XR CHEST 1 VIEW    Result Date: 11/25/2021  Linear opacities in the right lung base compatible with subsegmental atelectasis. No consolidation identified. Consultations:    Consults:     Final Specialist Recommendations/Findings:   IP CONSULT TO HOSPITALIST  IP CONSULT TO INFECTIOUS DISEASES  PHARMACY TO DOSE VANCOMYCIN      The patient was seen and examined on day of discharge and this discharge summary is in conjunction with any daily progress note from day of discharge. Discharge plan:     Disposition: Acute rehab    Physician Follow Up:     MARCELO Castillo CNP  [address]  [place] [address]  [phone]    Schedule an appointment as soon as possible for a visit in 1 week  for follow up after hospitalization    Ivan Goddard MD  [facility]  [address] [facility] [address] [phone]    Schedule an appointment as soon as possible for a visit in 2 weeks  For follow up after hospitalization with the infectious disease doc       Requiring Further Evaluation/Follow Up POST HOSPITALIZATION/Incidental Findings:   Needs a follow-up with wound care regarding his wounds along with vascular surgery and infectious disease    Diet: cardiac diet and diabetic diet    Activity: As tolerated    Instructions to Patient: None    Discharge Medications:      Medication List      START taking these medications    cefTRIAXone  infusion  Commonly known as: ROCEPHIN  Infuse 2,000 mg intravenously every 24 hours for 12 days Compound per protocol     sodium zirconium cyclosilicate 5 g Pack oral suspension  Commonly known as: Lokelma  Take 5 g by mouth 3 times daily for 2 days     vancomycin  infusion  Commonly known as: VANCOCIN  Infuse 750 mg intravenously every 12 hours for 12 days Compound per protocol.         CHANGE how you take these medications    budesonide-formoterol 160-4.5 MCG/ACT Aero  Commonly known as: SYMBICORT  Inhale 2 puffs into the lungs 2 times daily  What changed: additional instructions     tamsulosin 0.4 MG capsule  Commonly known as: FLOMAX  Take 1 capsule by mouth daily  What changed: when to take this        CONTINUE taking these medications    aluminum & magnesium hydroxide-simethicone 200-200-20 MG/5ML Susp suspension  Commonly known as: MAALOX     amitriptyline 75 MG tablet  Commonly known as: ELAVIL  Take 1 tablet by mouth nightly     apixaban 5 MG Tabs tablet  Commonly known as: Eliquis  Take 1 tablet by mouth 2 times daily     aspirin EC 81 MG EC tablet     atorvastatin 10 MG tablet  Commonly known as: LIPITOR     bisacodyl 5 MG EC tablet  Commonly known as: DULCOLAX  Take 1 tablet by mouth daily as needed for Constipation     docusate sodium 100 MG capsule  Commonly known as: Colace  Take 1 capsule by mouth 2 times daily     famotidine 20 MG tablet  Commonly known as: PEPCID  Take 1 tablet by mouth 2 times daily     ferrous sulfate 325 (65 Fe) MG tablet  Commonly known as: IRON 325  Take 1 tablet by mouth 2 times daily     glucose monitoring kit  1 kit by Does not apply route daily     hydrOXYzine 25 MG capsule  Commonly known as: VISTARIL  Take 1 capsule by mouth 3 times daily as needed for Anxiety     insulin lispro 100 UNIT/ML injection vial  Commonly known as: HUMALOG  Inject 0-12 Units into the skin 3 times daily (with meals)     lactobacillus Tabs  Take 1 tablet by mouth 2 times daily     Lancets Misc  1 each by Does not apply route 3 times daily     Levemir 100 UNIT/ML injection vial  Generic drug: insulin detemir  Inject 25 units, subcutaenously daily with diner     metFORMIN 500 MG tablet  Commonly known as: GLUCOPHAGE  Take 1 tablet by mouth 2 times daily (with meals)     metoprolol tartrate 25 MG tablet  Commonly known as: LOPRESSOR  Take 1 tablet by mouth 2 times daily Hold for heart rate less than 60 or systolic blood pressure less than 100     naloxegol 25 MG Tabs tablet  Commonly known as: MOVANTIK  Take 1 tablet by mouth every morning     simethicone 80 MG chewable tablet  Commonly known as: MYLICON  Take 1 tablet by mouth every 6 hours as needed for Flatulence therapeutic multivitamin-minerals tablet     Ventolin  (90 Base) MCG/ACT inhaler  Generic drug: albuterol sulfate HFA        STOP taking these medications    ceftaroline fosamil 600 MG Solr  Commonly known as: TEFLARO           Where to Get Your Medications      Information about where to get these medications is not yet available    Ask your nurse or doctor about these medications  cefTRIAXone  infusion  sodium zirconium cyclosilicate 5 g Pack oral suspension  vancomycin  infusion         No discharge procedures on file. Time Spent on discharge is  41 mins in patient examination, evaluation, counseling as well as medication reconciliation, prescriptions for required medications, discharge plan and follow up. Electronically signed by   Kristyn Weiss DO  11/26/2021  3:39 PM      Thank you Dr. Isra Mclaughlin, APRN - CNP for the opportunity to be involved in this patient's care.

## 2021-11-26 NOTE — PLAN OF CARE
Was informed this evening the patient was not to be accepted back to his previous rehab facility. Nursing staff was informed of their physician refused to admit him. It appears that everything was set up for the patient to go, but after reviewing of his chart, the physician had refused. He states that the patient was not participating in the 3 to 4 hours of rehab. Somewhere along the lines, it was relayed that the patient has C. difficile, which he does not. It sounds as if the patient has had aggressive comments and aggressive behavior toward staff at their facility, and that is the driving force behind the refusal of admission. This will need to be addressed in the morning with social work/case management once staff fully arrives.     Ronnie Ellsworth, [address]

## 2021-11-26 NOTE — PROGRESS NOTES
Writer attempted to call report to [facility], [phone], and was informed by the Supervisor Leydi Oh, that they were not going to accept this patient for admission. Writer was not given a reason as to why, just that they would not accept the patient. Social work gone for the day, so writer was not able to update. Lead Aliya Estrada updated.  left with [facility] to cancel transport, [phone]. Pt updated. Dr. Anthony Murphy updated.

## 2021-11-26 NOTE — CARE COORDINATION
Social Work-Spoke with [facility]. Pt will need a new precert. They began the precert earlier today.  Bernardino Woods

## 2021-11-26 NOTE — CARE COORDINATION
Spoke to Abimael Hernandez with [employer] and pt will be transported to [facility] by ambulance today at 7:30pm.  Randi MONTE informed.

## 2021-11-26 NOTE — PLAN OF CARE
Problem: Falls - Risk of:  Goal: Will remain free from falls  Description: Will remain free from falls  11/26/2021 0042 by Soheila Atkinson RN  Outcome: Ongoing     Problem: Falls - Risk of:  Goal: Absence of physical injury  Description: Absence of physical injury  11/26/2021 0042 by Soheila Atkinson RN  Outcome: Ongoing     Problem: Skin Integrity:  Goal: Will show no infection signs and symptoms  Description: Will show no infection signs and symptoms  11/26/2021 0042 by Soheila Atkinson RN  Outcome: Ongoing     Problem: Skin Integrity:  Goal: Absence of new skin breakdown  Description: Absence of new skin breakdown  11/26/2021 0042 by Soheila Atkinson RN  Outcome: Ongoing     Problem: Physical Regulation:  Goal: Will remain free from infection  Description: Will remain free from infection  11/26/2021 0042 by Soheila Atkinson RN  Outcome: Ongoing     Problem: Respiratory:  Goal: Ability to maintain normal respiratory secretions will improve  Description: Ability to maintain normal respiratory secretions will improve  11/26/2021 0042 by Soheila Atkinson RN  Outcome: Ongoing     Problem: Metabolic:  Goal: Ability to maintain appropriate glucose levels will improve  Description: Ability to maintain appropriate glucose levels will improve  11/26/2021 0042 by Soheila Atkinson RN  Outcome: Ongoing     Problem: Pain:  Goal: Pain level will decrease  Description: Pain level will decrease  Outcome: Ongoing     Problem: Pain:  Goal: Control of acute pain  Description: Control of acute pain  Outcome: Ongoing     Problem: Pain:  Goal: Control of chronic pain  Description: Control of chronic pain  Outcome: Ongoing

## 2021-11-26 NOTE — DISCHARGE INSTR - DIET
Good nutrition is important when healing from an illness, injury, or surgery. Follow any nutrition recommendations given to you during your hospital stay. If you were given an oral nutrition supplement while in the hospital, continue to take this supplement at home. You can take it with meals, in-between meals, and/or before bedtime. These supplements can be purchased at most local grocery stores, pharmacies, and chain [employer]stores. If you have any questions about your diet or nutrition, call the hospital and ask for the dietitian.
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
## 2021-11-26 NOTE — CARE COORDINATION
Met with patient at bedside. Recent D/C 11-13 for cellulitis. lt BKA to [facility] for 4 weeks of IV Teflaro. Has PICC rt upper extremity. Readmitted yesterday for hyperkalemia and diarrhea - potassium 5.8. ID consulted and pt switched to IV Rocephin and Vanco.  Pt to complete course of IV ATB on 12-8. Pt requesting to return to [facility] when discharged and SW informed. Will need another precert. LOREE initated. Continue to follow ID plan of care.

## 2021-11-26 NOTE — PROGRESS NOTES
[facility] Pharmacokinetic Monitoring Service - Vancomycin    Consulting Provider: Zoltan Ty  Indication: SSTI  Target Concentration: Goal AUC/DARIAN 400-600 mg*hr/L  Day of Therapy: 2  Additional Antimicrobials: Rocephin    Pertinent Laboratory Values: Wt Readings from Last 1 Encounters:   11/26/21 156 lb (70.8 kg)     Temp Readings from Last 1 Encounters:   11/26/21 97.7 °F (36.5 °C) (Oral)     Procalcitonin   Date Value Ref Range Status   05/25/2021 0.13 (H) <0.09 ng/mL Final     Comment:           Suspected Sepsis:  <0.50 ng/mL     Low likelihood of sepsis. 0.50-2.00 ng/mL     Increased likelihood of sepsis. Antibiotics encouraged. >2.00 ng/mL     High risk of sepsis/shock. Antibiotics strongly encouraged. Suspected Lower Resp Tract Infections:  <0.24 ng/mL     Low likelihood of bacterial infection. >0.24 ng/mL     Increased likelihood of bacterial infection. Antibiotics encouraged. With successful antibiotic therapy, PCT levels should decrease rapidly. (Half-life of 24 to   36 hours.)        Procalcitonin values from samples collected within the first 6 hours of systemic infection   may still be low. Retesting may be indicated. Values from day 1 and day 4 can be entered into the Change in Procalcitonin Calculator   ([email or web address]. [email or web address]) to determine the patient's Mortality Risk Prognosis        In healthy neonates, plasma Procalcitonin (PCT) concentrations increase gradually after   birth, reaching peak values at about 24 hours of age then decrease to normal values below   0.5 ng/mL by 48-72 hours of age. Estimated Creatinine Clearance: 77 mL/min (based on SCr of 0.97 mg/dL). Recent Labs     11/25/21  0926 11/25/21  0926 11/25/21  1409 11/26/21  0634   CREATININE 1.01   < > 0.88 0.97   WBC 7.2  --   --  7.7    < > = values in this interval not displayed. MRSA Nasal Swab: N/A. Non-respiratory infection. .    Recent vancomycin administrations vancomycin (VANCOCIN) 750 mg in dextrose 5 % 250 mL IVPB (mg) 750 mg New Bag 11/26/21 0229    vancomycin (VANCOCIN) 2,000 mg in dextrose 5 % 500 mL IVPB (mg) 2,000 mg New Bag 11/25/21 1554                    Assessment:  Date/Time Current Dose Concentration Timing of Concentration (h) AUC   11/26 750mg iv q 12 hours 21.1 ug/ml 06:47 469   Note: Serum concentrations collected for AUC dosing may appear elevated if collected in close proximity to the dose administered, this is not necessarily an indication of toxicity    Plan:  Current dosing regimen is therapeutic  Continue current dose  Pharmacy will continue to monitor patient and adjust therapy as indicated    Thank you for the consult,  ELIZABETH [facility]  11/26/2021 7:40 AM

## 2021-11-26 NOTE — RT PROTOCOL NOTE
RT Inhaler-Nebulizer Bronchodilator Protocol Note    There is a bronchodilator order in the chart from a provider indicating to follow the RT Bronchodilator Protocol and there is an Initiate RT Inhaler-Nebulizer Bronchodilator Protocol order as well (see protocol at bottom of note). CXR Findings:  No results found. The findings from the last RT Protocol Assessment were as follows:   History Pulmonary Disease: Chronic pulmonary disease  Respiratory Pattern: Regular pattern and RR 12-20 bpm  Breath Sounds: Clear breath sounds  Cough: Strong, spontaneous, non-productive  Indication for Bronchodilator Therapy: On home bronchodilators  Bronchodilator Assessment Score: 2    Aerosolized bronchodilator medication orders have been revised according to the RT Inhaler-Nebulizer Bronchodilator Protocol below. Respiratory Therapist to perform RT Therapy Protocol Assessment initially then follow the protocol. Repeat RT Therapy Protocol Assessment PRN for score 0-3 or on second treatment, BID, and PRN for scores above 3. No Indications - adjust the frequency to every 6 hours PRN wheezing or bronchospasm, if no treatments needed after 48 hours then discontinue using Per Protocol order mode. If indication present, adjust the RT bronchodilator orders based on the Bronchodilator Assessment Score as indicated below. Use Inhaler orders unless patient has one or more of the following: on home nebulizer, not able to hold breath for 10 seconds, is not alert and oriented, cannot activate and use MDI correctly, or respiratory rate 25 breaths per minute or more, then use the equivalent nebulizer order(s) with same Frequency and PRN reasons based on the score. If a patient is on this medication at home then do not decrease Frequency below that used at home.     0-3 - enter or revise RT bronchodilator order(s) to equivalent RT Bronchodilator order with Frequency of every 4 hours PRN for wheezing or increased work of breathing using Per Protocol order mode. 4-6 - enter or revise RT Bronchodilator order(s) to two equivalent RT bronchodilator orders with one order with BID Frequency and one order with Frequency of every 4 hours PRN wheezing or increased work of breathing using Per Protocol order mode. 7-10 - enter or revise RT Bronchodilator order(s) to two equivalent RT bronchodilator orders with one order with TID Frequency and one order with Frequency of every 4 hours PRN wheezing or increased work of breathing using Per Protocol order mode. 11-13 - enter or revise RT Bronchodilator order(s) to one equivalent RT bronchodilator order with QID Frequency and an Albuterol order with Frequency of every 4 hours PRN wheezing or increased work of breathing using Per Protocol order mode. Greater than 13 - enter or revise RT Bronchodilator order(s) to one equivalent RT bronchodilator order with every 4 hours Frequency and an Albuterol order with Frequency of every 2 hours PRN wheezing or increased work of breathing using Per Protocol order mode. RT to enter RT Home Evaluation for COPD & MDI Assessment order using Per Protocol order mode.     Electronically signed by Liyah Fitzgerald RCP on 11/25/2021 at 7:57 PM

## 2021-11-26 NOTE — PROGRESS NOTES
[facility]  Progress Note    Romero Parrish  MRN: [id number]  Date: 11/26/2021  LOS: 0     Reason for F/U :   Adverse drug reaction    Impression :   1. 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 Hyperkalemia concern for adverse effect secondary to ceftaroline  3. Diarrhea likely antibiotic induced but will need to rule out C. difficile infection  4. Diabetes mellitus with associated neuropathy  5. Peripheral arterial disease  6. COPD      Recommendations:   · The hyperkalemia has resolved. · The patient is on Rocephin and vancomycin currently. · The patient is okay for discharge back to the facility and I understand that the patient may need pre-CERT again. · The patient is to continue the antibiotics through December 8, 2021    Infection Control Recommendations:   Tach precautions    Discharge Planning:   Estimated Length of IV antimicrobials: Of 821  Patient has a PICC line Insertion  Patient will need: Home IV , [place],  SNF,  LTAC: Undetermined  Patient willneed outpatient wound care: No    Medical Decision making / Summary of Stay:   Romero Parrish is a 61y.o.-year-old male who was initially admitted on 11/25/2021. Aneta Sheth is well-known to me and has a history of peripheral arterial disease, COPD, diabetes mellitus with associated neuropathy, history of esophageal cancer among other medical problems. He was recently hospitalized here for a left BKA stump infected hematoma with osteomyelitis of the residual tibia. The patient did have MRSA/MSSA and Klebsiella Aerogenes and [facility] which he is scheduled to continue through 12/8/2021. The patient has been having diarrhea as well as lab testing showing increasing potassium variations that have been refractory to L[facility].   The concern was that the ceftaroline could potentially be causing the hyperkalemia.     The patient reports that every time he eats he has diarrhea. He reports that the stools are watery. He will not really quantify how many times he is going to the bathroom. Current evaluation:2021    BP (!) 147/58   Pulse 78   Temp 97.2 °F (36.2 °C) (Oral)   Resp 16   Ht 6' 1\" (1.854 m)   Wt 156 lb (70.8 kg)   SpO2 94%   BMI 20.58 kg/m²     Temperature Range: Temp: 97.2 °F (36.2 °C) Temp  Av.6 °F (36.4 °C)  Min: 97.2 °F (36.2 °C)  Max: 97.9 °F (36.6 °C)  The patient is seen and evaluated at bedside and is awake and alert in no acute distress. No subjective fevers or chills. No abdominal pain nausea vomiting or diarrhea. Review of Systems   Constitutional: Negative. Respiratory: Negative. Cardiovascular: Negative. Gastrointestinal: Negative. Genitourinary: Negative. Musculoskeletal: Negative. Allergic/Immunologic: Negative. Neurological: Negative. Physical Examination :     Physical Exam  Constitutional:       Appearance: He is well-developed. HENT:      Head: Normocephalic and atraumatic. Cardiovascular:      Rate and Rhythm: Normal rate. Heart sounds: Normal heart sounds. No friction rub. No gallop. Pulmonary:      Effort: Pulmonary effort is normal.      Breath sounds: Normal breath sounds. No wheezing. Abdominal:      General: Bowel sounds are normal.      Palpations: Abdomen is soft. There is no mass. Tenderness: There is no abdominal tenderness. Musculoskeletal:      Cervical back: Neck supple. Comments: Bilateral below the knee amputations   Lymphadenopathy:      Cervical: No cervical adenopathy. Skin:     General: Skin is warm and dry. Comments: The patient does have a scabbed lesion in the medial aspect of the left BKA stump. There is a dressing over the lateral stump wound. Neurological:      Mental Status: He is alert and oriented to person, place, and time.          Laboratory data:   I have independently reviewed the followinglabs:  CBC with Differential:   Recent Labs     11/25/21  0926 11/26/21  0634   WBC 7.2 7.7   HGB 10.8* 9.7*   HCT 37.2* 33.1*    341   LYMPHOPCT 25 18*   MONOPCT 11 8     BMP:   Recent Labs     11/25/21  1409 11/26/21  0634    141   K 4.8 4.6    108*   CO2 21 22   BUN 26* 22   CREATININE 0.88 0.97     Hepatic Function Panel:   Recent Labs     11/25/21  0926   PROT 7.6   LABALBU 3.2*   BILITOT 0.11*   ALKPHOS 127   ALT 6   AST 8         Lab Results   Component Value Date    PROCAL 0.13 05/25/2021    PROCAL 0.11 05/25/2021     Lab Results   Component Value Date    .7 11/04/2021    CRP 91.6 04/05/2021    .0 02/03/2021     Lab Results   Component Value Date    SEDRATE 97 (H) 11/04/2021         Lab Results   Component Value Date    DDIMER 0.39 06/29/2020    DDIMER 1.63 12/20/2017    DDIMER 0.68 12/25/2011     Lab Results   Component Value Date    FERRITIN 56 02/09/2021    FERRITIN 64 01/25/2014     No results found for: LDH  No results found for: FIBRINOGEN    Results in Past 30 Days  Result Component Current Result Ref Range Previous Result Ref Range   SARS-CoV-2, Rapid Not Detected (11/3/2021) Not Detected Not in Time Range      Lab Results   Component Value Date    COVID19 Not Detected 11/03/2021    COVID19 Not Detected 09/02/2021    COVID19 Not Detected 08/29/2021    COVID19 Not Detected 08/23/2020       No results for input(s): VANCOTROUGH in the last 72 hours. Imaging Studies:   ONE XRAY VIEW OF THE CHEST 11/25/2021 9:31 am  Impression   Linear opacities in the right lung base compatible with subsegmental   atelectasis.  No consolidation identified.           Cultures:     Culture, Urine [[id number]] Collected: 11/25/21 1134   Order Status: Completed Specimen: Urine, clean catch Updated: 11/26/21 1147    Specimen Description . CLEAN CATCH URINE    Special Requests NOT REPORTED    Culture NO GROWTH   Culture, Blood 1 [[id number]] Collected: 11/25/21 7724   Order Status: Completed Specimen: Blood Updated: 11/26/21 1137    Specimen Description . BLOOD    Special Requests LT AC,10ML    Culture NO GROWTH 22 HOURS   Culture, Blood 1 [[id number]] Collected: 11/25/21 0939   Order Status: Completed Specimen: Blood Updated: 11/26/21 1137    Specimen Description . BLOOD    Special Requests RT AC,10ML    Culture NO GROWTH 22 HOURS     Medications:      lactobacillus  1 tablet Oral BID    tamsulosin  0.4 mg Oral Nightly    therapeutic multivitamin-minerals  1 tablet Oral Daily    docusate sodium  100 mg Oral BID    atorvastatin  10 mg Oral Nightly    apixaban  5 mg Oral BID    famotidine  20 mg Oral BID    amitriptyline  75 mg Oral Nightly    metFORMIN  500 mg Oral BID WC    ferrous sulfate  325 mg Oral BID    metoprolol tartrate  25 mg Oral BID    naloxegol  25 mg Oral QAM    budesonide-formoterol  2 puff Inhalation BID    aspirin EC  81 mg Oral Daily    insulin glargine  25 Units SubCUTAneous Dinner    insulin lispro  0-18 Units SubCUTAneous TID WC    insulin lispro  0-9 Units SubCUTAneous Nightly    sodium chloride flush  5-40 mL IntraVENous 2 times per day    sodium zirconium cyclosilicate  10 g Oral TID    cefTRIAXone (ROCEPHIN) IV  1,000 mg IntraVENous Q24H    vancomycin (VANCOCIN) intermittent dosing (placeholder)   Other RX Placeholder    vancomycin  750 mg IntraVENous Q12H           [facility]  Rose Acevedo MD  [employer] messaging  OFFICE: [phone]      Electronically signed by Rose Acevedo MD on 11/26/2021 at 12:44 PM  Thank you for allowing us to participate in the care of this patient. Please call with questions.     This note iscreated with the assistance of a speech recognition program.  While intending to generate a document that actually reflects the content of the visit, the document can still have some errors including those of syntax andsound a like substitutions which may

## 2021-11-26 NOTE — PROGRESS NOTES
Transitions of Care Pharmacy Service   Medication Review    The patient's list of prior to admission medications was reviewed. Source(s) of information: med list from facility ([facility])    PROVIDER ACTION REQUESTED  Medications that need to be addressed by a physician/nurse practitioner:    Medication Action Requested   Metformin 500mg BID   Getting 1000mg BID at facility instead -- please adjust as appropriate        Januvia and Lyrica need physician review         Please feel free to call me with any questions about this encounter. Thank you. Nelly Cochran [facility]   Transitions of Care Pharmacy Service  Phone:  [phone]  Fax: [phone]      Electronically signed by Nelly Cochran [facility] on 11/26/2021 at 6:56 PM           Medications Prior to Admission:   pregabalin (LYRICA) 75 MG capsule, Take 75 mg by mouth 3 times daily.   SITagliptin (JANUVIA) 100 MG tablet, Take 100 mg by mouth daily  insulin lispro (HUMALOG) 100 UNIT/ML injection vial, Inject 0-12 Units into the skin 3 times daily (with meals)  ceftaroline fosamil (TEFLARO) 600 MG SOLR, Infuse 600 mg intravenously every 12 hours for 28 days  aspirin EC 81 MG EC tablet, Take 81 mg by mouth daily  apixaban (ELIQUIS) 5 MG TABS tablet, Take 1 tablet by mouth 2 times daily  insulin detemir (LEVEMIR) 100 UNIT/ML injection vial, Inject 25 units, subcutaenously daily with diner  famotidine (PEPCID) 20 MG tablet, Take 1 tablet by mouth 2 times daily  amitriptyline (ELAVIL) 75 MG tablet, Take 1 tablet by mouth nightly  metFORMIN (GLUCOPHAGE) 500 MG tablet, Take 1 tablet by mouth 2 times daily (with meals) (Patient taking differently: Take 1,000 mg by mouth 2 times daily (with meals) )  Lancets MISC, 1 each by Does not apply route 3 times daily  ferrous sulfate (IRON 325) 325 (65 Fe) MG tablet, Take 1 tablet by mouth 2 times daily  metoprolol tartrate (LOPRESSOR) 25 MG tablet, Take 1 tablet by mouth 2 times daily Hold for heart rate less than 60 or systolic blood pressure less than 100  simethicone (MYLICON) 80 MG chewable tablet, Take 1 tablet by mouth every 6 hours as needed for Flatulence  hydrOXYzine (VISTARIL) 25 MG capsule, Take 1 capsule by mouth 3 times daily as needed for Anxiety  naloxegol (MOVANTIK) 25 MG TABS tablet, Take 1 tablet by mouth every morning  budesonide-formoterol (SYMBICORT) 160-4.5 MCG/ACT AERO, Inhale 2 puffs into the lungs 2 times daily  atorvastatin (LIPITOR) 10 MG tablet, Take 10 mg by mouth nightly   glucose monitoring (FREESTYLE) kit, 1 kit by Does not apply route daily  docusate sodium (COLACE) 100 MG capsule, Take 1 capsule by mouth 2 times daily  Multiple Vitamins-Minerals (THERAPEUTIC MULTIVITAMIN-MINERALS) tablet, Take 1 tablet by mouth daily  aluminum & magnesium hydroxide-simethicone (MAALOX) 200-200-20 MG/5ML SUSP suspension, Take 10 mLs by mouth every 6 hours as needed for Indigestion   bisacodyl (DULCOLAX) 5 MG EC tablet, Take 1 tablet by mouth daily as needed for Constipation  tamsulosin (FLOMAX) 0.4 MG capsule, Take 1 capsule by mouth daily (Patient taking differently: Take 0.4 mg by mouth nightly )  lactobacillus (BACID) TABS, Take 1 tablet by mouth 2 times daily  albuterol sulfate HFA (VENTOLIN HFA) 108 (90 Base) MCG/ACT inhaler, Inhale 2 puffs into the lungs every 6 hours as needed for Wheezing

## 2021-11-26 NOTE — ACP (ADVANCE CARE PLANNING)
Advance Care Planning     Advance Care Planning Activator (Inpatient)  Conversation Note      Date of ACP Conversation: 11/26/2021     Conversation Conducted with: Patient with Decision Making Capacity    ACP Activator: Renetta Marks RN    {When Decision Maker makes decisions on behalf of the incapacitated patient: Decision Maker is asked to consider and make decisions based on patient values, known preferences, or best interests. Health Care Decision Maker:     Current Designated Health Care Decision Maker:     Primary Decision Maker: Michael Estrada  Efren - [phone]     Click here to complete Healthcare Decision Makers including section of the Healthcare Decision Maker Relationship (ie \"Primary\")    Care Preferences    Ventilation: \"If you were in your present state of health and suddenly became very ill and were unable to breathe on your own, what would your preference be about the use of a ventilator (breathing machine) if it were available to you? \"      Would the patient desire the use of ventilator (breathing machine)?: yes    \"If your health worsens and it becomes clear that your chance of recovery is unlikely, what would your preference be about the use of a ventilator (breathing machine) if it were available to you? \"     Would the patient desire the use of ventilator (breathing machine)?: Yes      Resuscitation  \"CPR works best to restart the heart when there is a sudden event, like a heart attack, in someone who is otherwise healthy. Unfortunately, CPR does not typically restart the heart for people who have serious health conditions or who are very sick. \"    \"In the event your heart stopped as a result of an underlying serious health condition, would you want attempts to be made to restart your heart (answer \"yes\" for attempt to resuscitate) or would you prefer a natural death (answer \"no\" for do not attempt to resuscitate)? \" yes       [] Yes   [x] No   Educated Patient / Barbara Mauricio regarding differences between Advance Directives and portable DNR orders.     Length of ACP Conversation in minutes:  5    Conversation Outcomes:  [x] ACP discussion completed  [] Existing advance directive reviewed with patient; no changes to patient's previously recorded wishes  [] New Advance Directive completed  [] Portable Do Not Rescitate prepared for Provider review and signature  [] POLST/POST/MOLST/MOST prepared for Provider review and signature      Follow-up plan:    [] Schedule follow-up conversation to continue planning  [] Referred individual to Provider for additional questions/concerns   [] Advised patient/agent/surrogate to review completed ACP document and update if needed with changes in condition, patient preferences or care setting    [] This note routed to one or more involved healthcare providers

## 2021-11-26 NOTE — PROGRESS NOTES
Learning About the Safe Use of Antibiotics  Introduction  Antibiotics are drugs used to kill bacteria. Bacteria can cause infections. These include strep throat, ear infections, and pneumonia. These medicines can't cure everything. They don't kill viruses or help with allergies. They don't help illnesses such as the common cold, the flu, or a runny nose. And they can cause side effects. There are many types of antibiotics. Your doctor will decide which one will work best for your infection. Examples include:  · Amoxicillin. · Cephalexin (Keflex). · Ciprofloxacin (Cipro). What are the possible side effects? Side effects can include:  · Nausea. · Diarrhea. · Skin rash. · Yeast infection. · A severe allergic reaction. It may cause itching, swelling, and breathing problems. This is rare. You may have other side effects or reactions not listed here. Check the information that comes with your medicine. Should you take antibiotics just in case? Don't take antibiotics when you don't need them. If you do that, they may not work when you do need them. Each time you take antibiotics, you are more likely to have some bacteria that survive and aren't killed by the medicine. Bacteria that don't die can change and become even harder to kill. These are called antibiotic-resistant bacteria. They can cause longer and more serious infections. To treat them, you may need different, stronger antibiotics that have more side effects and may cost more. So always ask your doctor if antibiotics are the best treatment. Explain that you do not want antibiotics unless you need them. Help protect the community  Using antibiotics when they're not needed leads to the development of antibiotic-resistant bacteria. These tougher bacteria can spread to family members, children, and coworkers. People in your community will have a risk of getting an infection that is harder to cure and that costs more to treat.   How can you take antibiotics safely? Be safe with medicine. Take your antibiotics as directed. Do not stop taking them just because you feel better. You need to take the full course of medicine. This will help make sure your infection is cured. It will also help prevent the growth of antibiotic-resistant bacteria. Always take the exact amount that the label says to take. If the label says to take the medicine at a certain time, follow those directions. You might feel better after you take an antibiotic for a few days. But it is important to keep taking it for as long as prescribed. That will help you get rid of those bacteria that are a bit stronger and that survive the first few days of treatment. Where can you learn more? Go to [email or web address] org and sign in to your [employer] account. Enter Y241 in the Dialective box to learn more about \"Learning About the Safe Use of Antibiotics. \"     If you do not have an account, please click on the \"Sign Up Now\" link. Current as of: March 3, 2017  Content Version: 11.3  © 9172-0323 [employer]. Care instructions adapted under license by [facility] ([facility]). If you have questions about a medical condition or this instruction, always ask your healthcare professional. [place] [address] any warranty or liability for your use of this information. Antibiotics are powerful drugs that are generally safe and very helpful in fighting disease, but there are times when antibiotics can actually be harmful. Antibiotics can have side effects, including allergic reactions and a potentially deadly diarrhea caused by the bacteria Clostridium difficile (C. diff). Antibiotics can also interfere with the action of other drugs a patient may be taking for another condition. These unintended reactions to antibiotics are called adverse drug events.    When someone takes an antibiotic that they do not need, they are needlessly exposed to the side effects of the drug and do not get any benefit from it. Moreover, taking an antibiotic when it is not needed can lead to the development of antibiotic resistance. When resistance develops, antibiotics may not be able to stop future infections. Every time someone takes an antibiotic they dont need, they increase their risk of developing a resistant infection in the future. Types of Adverse Drug Events Related to Antibiotics  Allergic Reactions  Every year, there are more than 140,000 emergency department visits for reactions to antibiotics. Almost four out of five (79%) emergency department visits for antibiotic-related adverse drug events are due to an allergic reaction. These reactions can range from mild rashes and itching to serious blistering skin reactions swelling of the face and throat, and breathing problems. Minimizing unnecessary antibiotic use is the best way to reduce the risk of adverse drug events from antibiotics. Patients should tell their doctors about any past drug reactions or allergies. C. difficile  C. difficile causes diarrhea linked to at least 14,000 [place] deaths each year. When a person takes antibiotics, good bacteria that protect against infection are destroyed for several months. During this time, patients can get sick from C. difficile picked up from contaminated surfaces or spread from a healthcare providers hands. Those most at risk are people, especially older adults, who take antibiotics and also get medical care. Take antibiotics exactly and only as prescribed. Drug Interactions and Side Effects  Antibiotics can interact with other drugs patients take, making those drugs or the antibiotics less effective. Some drug combinations can worsen the side effects of the antibiotic or other drug. Common side effects of antibiotics include nausea, diarrhea, and stomach pain. Sometimes these symptoms can lead to dehydration and other problems.  Patients should ask their doctors about drug interactions and the potential side effects of antibiotics.  The doctor should be told immediately if a patient has any side effects from antibiotics  Page last updated: February 24, 2017 Content source:   Centers [employer] Disease Control and [employer] for Emerging and Zoonotic Infectious Diseases (Ann Nowak)  Division of Healthcare Quality Promotion [facility], [place])

## 2021-11-27 LAB
GLUCOSE BLD-MCNC: 111 MG/DL (ref 75–110)
GLUCOSE BLD-MCNC: 130 MG/DL (ref 75–110)
GLUCOSE BLD-MCNC: 166 MG/DL (ref 75–110)
GLUCOSE BLD-MCNC: 270 MG/DL (ref 75–110)

## 2021-11-27 PROCEDURE — 6360000002 HC RX W HCPCS: Performed by: INTERNAL MEDICINE

## 2021-11-27 PROCEDURE — 99233 SBSQ HOSP IP/OBS HIGH 50: CPT | Performed by: INTERNAL MEDICINE

## 2021-11-27 PROCEDURE — 99232 SBSQ HOSP IP/OBS MODERATE 35: CPT | Performed by: INTERNAL MEDICINE

## 2021-11-27 PROCEDURE — 2580000003 HC RX 258: Performed by: INTERNAL MEDICINE

## 2021-11-27 PROCEDURE — 82947 ASSAY GLUCOSE BLOOD QUANT: CPT

## 2021-11-27 PROCEDURE — 6370000000 HC RX 637 (ALT 250 FOR IP): Performed by: INTERNAL MEDICINE

## 2021-11-27 PROCEDURE — 94761 N-INVAS EAR/PLS OXIMETRY MLT: CPT

## 2021-11-27 PROCEDURE — 6370000000 HC RX 637 (ALT 250 FOR IP): Performed by: NURSE PRACTITIONER

## 2021-11-27 PROCEDURE — 1200000000 HC SEMI PRIVATE

## 2021-11-27 PROCEDURE — 94640 AIRWAY INHALATION TREATMENT: CPT

## 2021-11-27 RX ORDER — OXYCODONE HYDROCHLORIDE AND ACETAMINOPHEN 5; 325 MG/1; MG/1
1 TABLET ORAL EVERY 4 HOURS PRN
Status: DISCONTINUED | OUTPATIENT
Start: 2021-11-27 | End: 2021-11-27

## 2021-11-27 RX ORDER — HYDROCORTISONE 25 MG/G
CREAM TOPICAL 2 TIMES DAILY
Status: DISCONTINUED | OUTPATIENT
Start: 2021-11-27 | End: 2021-11-29

## 2021-11-27 RX ORDER — PREGABALIN 75 MG/1
75 CAPSULE ORAL 3 TIMES DAILY
Status: DISCONTINUED | OUTPATIENT
Start: 2021-11-27 | End: 2021-12-03 | Stop reason: HOSPADM

## 2021-11-27 RX ORDER — AMITRIPTYLINE HYDROCHLORIDE 25 MG/1
75 TABLET, FILM COATED ORAL NIGHTLY
Status: DISCONTINUED | OUTPATIENT
Start: 2021-11-27 | End: 2021-12-03 | Stop reason: HOSPADM

## 2021-11-27 RX ADMIN — TAMSULOSIN HYDROCHLORIDE 0.4 MG: 0.4 CAPSULE ORAL at 22:55

## 2021-11-27 RX ADMIN — PROBIOTIC PRODUCT - TAB 1 TABLET: TAB at 22:55

## 2021-11-27 RX ADMIN — FERROUS SULFATE TAB EC 325 MG (65 MG FE EQUIVALENT) 325 MG: 325 (65 FE) TABLET DELAYED RESPONSE at 22:57

## 2021-11-27 RX ADMIN — INSULIN LISPRO 2 UNITS: 100 INJECTION, SOLUTION INTRAVENOUS; SUBCUTANEOUS at 22:54

## 2021-11-27 RX ADMIN — ASPIRIN 81 MG: 81 TABLET, COATED ORAL at 08:18

## 2021-11-27 RX ADMIN — METFORMIN HYDROCHLORIDE 500 MG: 500 TABLET ORAL at 17:20

## 2021-11-27 RX ADMIN — APIXABAN 5 MG: 5 TABLET, FILM COATED ORAL at 22:56

## 2021-11-27 RX ADMIN — SODIUM ZIRCONIUM CYCLOSILICATE 10 G: 10 POWDER, FOR SUSPENSION ORAL at 22:55

## 2021-11-27 RX ADMIN — DOCUSATE SODIUM 100 MG: 100 CAPSULE, LIQUID FILLED ORAL at 08:17

## 2021-11-27 RX ADMIN — AMITRIPTYLINE HYDROCHLORIDE 75 MG: 25 TABLET, FILM COATED ORAL at 22:55

## 2021-11-27 RX ADMIN — MULTIPLE VITAMINS W/ MINERALS TAB 1 TABLET: TAB at 08:17

## 2021-11-27 RX ADMIN — BUDESONIDE AND FORMOTEROL FUMARATE DIHYDRATE 2 PUFF: 160; 4.5 AEROSOL RESPIRATORY (INHALATION) at 19:58

## 2021-11-27 RX ADMIN — BUDESONIDE AND FORMOTEROL FUMARATE DIHYDRATE 2 PUFF: 160; 4.5 AEROSOL RESPIRATORY (INHALATION) at 07:32

## 2021-11-27 RX ADMIN — APIXABAN 5 MG: 5 TABLET, FILM COATED ORAL at 08:18

## 2021-11-27 RX ADMIN — Medication 3 MG: at 22:55

## 2021-11-27 RX ADMIN — Medication: at 08:19

## 2021-11-27 RX ADMIN — OXYCODONE AND ACETAMINOPHEN 1 TABLET: 5; 325 TABLET ORAL at 11:58

## 2021-11-27 RX ADMIN — MORPHINE SULFATE 4 MG: 4 INJECTION INTRAVENOUS at 08:18

## 2021-11-27 RX ADMIN — CEFTRIAXONE SODIUM 1000 MG: 1 INJECTION, POWDER, FOR SOLUTION INTRAMUSCULAR; INTRAVENOUS at 14:43

## 2021-11-27 RX ADMIN — ACETAMINOPHEN 650 MG: 325 TABLET ORAL at 08:17

## 2021-11-27 RX ADMIN — HYDROCORTISONE: 25 CREAM TOPICAL at 22:58

## 2021-11-27 RX ADMIN — FERROUS SULFATE TAB EC 325 MG (65 MG FE EQUIVALENT) 325 MG: 325 (65 FE) TABLET DELAYED RESPONSE at 08:18

## 2021-11-27 RX ADMIN — INSULIN GLARGINE 25 UNITS: 100 INJECTION, SOLUTION SUBCUTANEOUS at 17:20

## 2021-11-27 RX ADMIN — PREGABALIN 75 MG: 75 CAPSULE ORAL at 22:56

## 2021-11-27 RX ADMIN — PREGABALIN 75 MG: 75 CAPSULE ORAL at 15:48

## 2021-11-27 RX ADMIN — HYDROCORTISONE: 25 CREAM TOPICAL at 14:47

## 2021-11-27 RX ADMIN — Medication 5 ML: at 20:48

## 2021-11-27 RX ADMIN — INSULIN LISPRO 9 UNITS: 100 INJECTION, SOLUTION INTRAVENOUS; SUBCUTANEOUS at 17:20

## 2021-11-27 RX ADMIN — PROBIOTIC PRODUCT - TAB 1 TABLET: TAB at 08:17

## 2021-11-27 RX ADMIN — ATORVASTATIN CALCIUM 10 MG: 10 TABLET, FILM COATED ORAL at 22:55

## 2021-11-27 RX ADMIN — DOCUSATE SODIUM 100 MG: 100 CAPSULE, LIQUID FILLED ORAL at 22:55

## 2021-11-27 RX ADMIN — VANCOMYCIN HYDROCHLORIDE 750 MG: 750 INJECTION, POWDER, LYOPHILIZED, FOR SOLUTION INTRAVENOUS at 03:17

## 2021-11-27 RX ADMIN — FAMOTIDINE 20 MG: 20 TABLET ORAL at 08:18

## 2021-11-27 RX ADMIN — SODIUM ZIRCONIUM CYCLOSILICATE 10 G: 10 POWDER, FOR SUSPENSION ORAL at 14:43

## 2021-11-27 RX ADMIN — MORPHINE SULFATE 4 MG: 4 INJECTION INTRAVENOUS at 22:54

## 2021-11-27 RX ADMIN — SODIUM ZIRCONIUM CYCLOSILICATE 10 G: 10 POWDER, FOR SUSPENSION ORAL at 08:18

## 2021-11-27 RX ADMIN — VANCOMYCIN HYDROCHLORIDE 750 MG: 750 INJECTION, POWDER, LYOPHILIZED, FOR SOLUTION INTRAVENOUS at 15:48

## 2021-11-27 RX ADMIN — METFORMIN HYDROCHLORIDE 500 MG: 500 TABLET ORAL at 08:17

## 2021-11-27 RX ADMIN — NALOXEGOL OXALATE 25 MG: 25 TABLET, FILM COATED ORAL at 08:17

## 2021-11-27 RX ADMIN — FAMOTIDINE 20 MG: 20 TABLET ORAL at 22:57

## 2021-11-27 RX ADMIN — METOPROLOL TARTRATE 25 MG: 25 TABLET, FILM COATED ORAL at 22:55

## 2021-11-27 RX ADMIN — METOPROLOL TARTRATE 25 MG: 25 TABLET, FILM COATED ORAL at 08:17

## 2021-11-27 ASSESSMENT — ENCOUNTER SYMPTOMS
GASTROINTESTINAL NEGATIVE: 1
RESPIRATORY NEGATIVE: 1
ALLERGIC/IMMUNOLOGIC NEGATIVE: 1

## 2021-11-27 ASSESSMENT — PAIN DESCRIPTION - ONSET
ONSET: ON-GOING

## 2021-11-27 ASSESSMENT — PAIN DESCRIPTION - PROGRESSION
CLINICAL_PROGRESSION: NOT CHANGED
CLINICAL_PROGRESSION: NOT CHANGED

## 2021-11-27 ASSESSMENT — PAIN SCALES - GENERAL
PAINLEVEL_OUTOF10: 9
PAINLEVEL_OUTOF10: 7
PAINLEVEL_OUTOF10: 8

## 2021-11-27 ASSESSMENT — PAIN DESCRIPTION - LOCATION
LOCATION: LEG

## 2021-11-27 ASSESSMENT — PAIN DESCRIPTION - DESCRIPTORS
DESCRIPTORS: ACHING;DISCOMFORT
DESCRIPTORS: ACHING
DESCRIPTORS: ACHING;DISCOMFORT

## 2021-11-27 ASSESSMENT — PAIN DESCRIPTION - FREQUENCY
FREQUENCY: CONTINUOUS

## 2021-11-27 ASSESSMENT — PAIN DESCRIPTION - PAIN TYPE
TYPE: ACUTE PAIN

## 2021-11-27 ASSESSMENT — PAIN - FUNCTIONAL ASSESSMENT
PAIN_FUNCTIONAL_ASSESSMENT: PREVENTS OR INTERFERES SOME ACTIVE ACTIVITIES AND ADLS

## 2021-11-27 ASSESSMENT — PAIN DESCRIPTION - ORIENTATION
ORIENTATION: LEFT
ORIENTATION: LEFT

## 2021-11-27 NOTE — PLAN OF CARE
Problem: Falls - Risk of:  Goal: Will remain free from falls  Description: Will remain free from falls  11/27/2021 1324 by Rolan Rios RN  Outcome: Ongoing     Problem: Skin Integrity:  Goal: Will show no infection signs and symptoms  Description: Will show no infection signs and symptoms  11/27/2021 1324 by Rolan Rios RN  Outcome: Ongoing     Problem: Pain:  Goal: Pain level will decrease  Description: Pain level will decrease  11/27/2021 1324 by Rolan Rios RN  Outcome: Ongoing

## 2021-11-27 NOTE — PROGRESS NOTES
Vancomycin Dosing by Pharmacy - Daily Note   Vancomycin Therapy Day:  3  Indication: SSTI    Allergies:  Gabapentin and Other   Actual Weight:    Wt Readings from Last 1 Encounters:   11/27/21 156 lb (70.8 kg)       Labs/Ancillary Data  Estimated Creatinine Clearance: 77 mL/min (based on SCr of 0.97 mg/dL). Recent Labs     11/25/21  0926 11/25/21  1409 11/26/21  0634   CREATININE 1.01 0.88 0.97   BUN 30* 26* 22   WBC 7.2  --  7.7     Procalcitonin   Date Value Ref Range Status   05/25/2021 0.13 (H) <0.09 ng/mL Final     Comment:           Suspected Sepsis:  <0.50 ng/mL     Low likelihood of sepsis. 0.50-2.00 ng/mL     Increased likelihood of sepsis. Antibiotics encouraged. >2.00 ng/mL     High risk of sepsis/shock. Antibiotics strongly encouraged. Suspected Lower Resp Tract Infections:  <0.24 ng/mL     Low likelihood of bacterial infection. >0.24 ng/mL     Increased likelihood of bacterial infection. Antibiotics encouraged. With successful antibiotic therapy, PCT levels should decrease rapidly. (Half-life of 24 to   36 hours.)        Procalcitonin values from samples collected within the first 6 hours of systemic infection   may still be low. Retesting may be indicated. Values from day 1 and day 4 can be entered into the Change in Procalcitonin Calculator   ([email or web address]-pct-calculator. [place]) to determine the patient's Mortality Risk Prognosis        In healthy neonates, plasma Procalcitonin (PCT) concentrations increase gradually after   birth, reaching peak values at about 24 hours of age then decrease to normal values below   0.5 ng/mL by 48-72 hours of age.          Intake/Output Summary (Last 24 hours) at 11/27/2021 1651  Last data filed at 11/27/2021 1513  Gross per 24 hour   Intake 2100 ml   Output 2050 ml   Net 50 ml     Temp: 97.5F    Recent vancomycin administrations                     vancomycin (VANCOCIN) 750 mg in dextrose 5 % 250 mL IVPB (mg) 750 mg New Bag 11/27/21 1548     750 mg New Bag  0317     750 mg New Bag 11/26/21 1548     750 mg New Bag  0229    vancomycin (VANCOCIN) 2,000 mg in dextrose 5 % 500 mL IVPB (mg) 2,000 mg New Bag 11/25/21 1554                  Vanco Random:   Recent Labs     11/26/21  0634   VANCORANDOM 21.1     Vanco Trough: No results for input(s): VANCOTROUGH in the last 72 hours. MRSA Nasal Swab: N/A. Non-respiratory infection. Estefany Martinez PLAN   Continue current regimen      Vancomycin Target Concentration Parameters  Treatment  Population Target AUC/DARIAN Target Trough   Invasive MRSA Infection (bacteremia, pneumonia, meningitis, endocarditis, osteomyelitis)  Sepsis (undifferentiated) 400-600 N/A   Infection due to non-MRSA pathogen  Empiric treatment of non-invasive MRSA infection  (SSTI, UTI) <500 10-15 mg/L   CrCl < 29 mL/min  Rapidly fluctuating serum creatinine   LUMA N/A < 15 mg/L     Renal replacement therapy is dosed by levels, per hospital protocol. Abbreviations  * Pauc: probability that AUC is >400 (efficacy); Pconc: probability that Ctrough is above 20 ?g/mL (toxicity); Tox: Probability of nephrotoxicity, based on Marquis et al. Clin Infect Dis 2009. Thank you for the consult. Pharmacy will continue to follow.

## 2021-11-27 NOTE — PROGRESS NOTES
[facility]  Progress Note    Phoenix Wheeler  MRN: [id number]  Date: 11/27/2021  LOS: 1     Reason for F/U :   Adverse drug reaction    Impression :   1. 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 Hyperkalemia concern for adverse effect secondary to ceftaroline  3. Right leg rash likely contact dermatitis to prosthesis  4. Diabetes mellitus with associated neuropathy  5. Peripheral arterial disease  6. COPD      Recommendations:   · The hyperkalemia has resolved. · IV Rocephin and vancomycin through December 8, 2021  · The patient is okay for discharge back to the facility   · Follow CBC, BUN/creatinine and liver enzymes weekly while on IV antibiotics  · No growth on blood or urine cultures from 11/25/2021    Infection Control Recommendations:   Tach precautions    Discharge Planning:   Estimated Length of IV antimicrobials:   Patient has a PICC line Insertion  Patient will need: Home IV , [place],  SNF,  LTAC: Undetermined  Patient willneed outpatient wound care: No    Medical Decision making / Summary of Stay:   Phoenix Wheeler is a 61y.o.-year-old male who was initially admitted on 11/25/2021. Nav Wise is well-known to me and has a history of peripheral arterial disease, COPD, diabetes mellitus with associated neuropathy, history of esophageal cancer among other medical problems. He was recently hospitalized here for a left BKA stump infected hematoma with osteomyelitis of the residual tibia. The patient did have MRSA/MSSA and Klebsiella Aerogenes and [facility] which he is scheduled to continue through 12/8/2021. The patient has been having diarrhea as well as lab testing showing increasing potassium variations that have been refractory to [facility]. The concern was that the ceftaroline could potentially be causing the hyperkalemia.       Current evaluation:2021    BP (!) 143/61   Pulse 82   Temp 97.7 °F (36.5 °C) (Oral)   Resp 18   Ht 6' 1\" (1.854 m)   Wt 156 lb (70.8 kg)   SpO2 96%   BMI 20.58 kg/m²     Temperature Range: Temp: 97.7 °F (36.5 °C) Temp  Av.6 °F (36.4 °C)  Min: 97.2 °F (36.2 °C)  Max: 98 °F (36.7 °C)  The patient is seen and evaluated at bedside and is awake and alert in no acute distress. He is feeling well, denied fever or chills, denies significant pain, denied nausea or vomiting, had maculopapular rash to the right leg. Review of Systems   Constitutional: Negative. Respiratory: Negative. Cardiovascular: Negative. Gastrointestinal: Negative. Genitourinary: Negative. Musculoskeletal: Negative. Allergic/Immunologic: Negative. Neurological: Negative. Physical Examination :     Physical Exam  Constitutional:       Appearance: He is well-developed. HENT:      Head: Normocephalic and atraumatic. Cardiovascular:      Rate and Rhythm: Normal rate. Heart sounds: Normal heart sounds. No friction rub. No gallop. Pulmonary:      Effort: Pulmonary effort is normal.      Breath sounds: Normal breath sounds. No wheezing. Abdominal:      General: Bowel sounds are normal.      Palpations: Abdomen is soft. There is no mass. Tenderness: There is no abdominal tenderness. Musculoskeletal:      Cervical back: Neck supple. Comments: Bilateral below the knee amputations   Lymphadenopathy:      Cervical: No cervical adenopathy. Skin:     General: Skin is warm and dry. Comments: Left BKA stump dressing  Right BKA stump with maculopapular rash   Neurological:      Mental Status: He is alert and oriented to person, place, and time.          Laboratory data:   I have independently reviewed the followinglabs:  CBC with Differential:   Recent Labs     21  0926 21  0634   WBC 7.2 7.7   HGB 10.8* 9.7*   HCT 37.2* 33.1*    341   LYMPHOPCT 25 18*   MONOPCT 11 8     BMP: Recent Labs     11/25/21  1409 11/26/21  0634    141   K 4.8 4.6    108*   CO2 21 22   BUN 26* 22   CREATININE 0.88 0.97     Hepatic Function Panel:   Recent Labs     11/25/21  0926   PROT 7.6   LABALBU 3.2*   BILITOT 0.11*   ALKPHOS 127   ALT 6   AST 8         Lab Results   Component Value Date    PROCAL 0.13 05/25/2021    PROCAL 0.11 05/25/2021     Lab Results   Component Value Date    .7 11/04/2021    CRP 91.6 04/05/2021    .0 02/03/2021     Lab Results   Component Value Date    SEDRATE 97 (H) 11/04/2021         Lab Results   Component Value Date    DDIMER 0.39 06/29/2020    DDIMER 1.63 12/20/2017    DDIMER 0.68 12/25/2011     Lab Results   Component Value Date    FERRITIN 56 02/09/2021    FERRITIN 64 01/25/2014     No results found for: LDH  No results found for: FIBRINOGEN    Results in Past 30 Days  Result Component Current Result Ref Range Previous Result Ref Range   SARS-CoV-2, Rapid Not Detected (11/3/2021) Not Detected Not in Time Range      Lab Results   Component Value Date    COVID19 Not Detected 11/03/2021    COVID19 Not Detected 09/02/2021    COVID19 Not Detected 08/29/2021    COVID19 Not Detected 08/23/2020       No results for input(s): [facility] in the last 72 hours. Imaging Studies:   ONE XRAY VIEW OF THE CHEST 11/25/2021 9:31 am  Impression   Linear opacities in the right lung base compatible with subsegmental   atelectasis.  No consolidation identified.           Cultures:     Culture, Urine [[id number]] Collected: 11/25/21 1134   Order Status: Completed Specimen: Urine, clean catch Updated: 11/26/21 1147    Specimen Description . CLEAN CATCH URINE    Special Requests NOT REPORTED    Culture NO GROWTH   Culture, Blood 1 [[id number]] Collected: 11/25/21 0938   Order Status: Completed Specimen: Blood Updated: 11/26/21 1137    Specimen Description . BLOOD    Special Requests LT AC,10ML    Culture NO GROWTH 22 HOURS   Culture, Blood 1 [[id number]] Collected: 11/25/21 2283   Order Status: Completed Specimen: Blood Updated: 11/26/21 1137    Specimen Description . BLOOD    Special Requests RT AC,10ML    Culture NO GROWTH 22 HOURS     Medications:      amitriptyline  75 mg Oral Nightly    lactobacillus  1 tablet Oral BID    tamsulosin  0.4 mg Oral Nightly    therapeutic multivitamin-minerals  1 tablet Oral Daily    docusate sodium  100 mg Oral BID    atorvastatin  10 mg Oral Nightly    apixaban  5 mg Oral BID    famotidine  20 mg Oral BID    metFORMIN  500 mg Oral BID WC    ferrous sulfate  325 mg Oral BID    metoprolol tartrate  25 mg Oral BID    naloxegol  25 mg Oral QAM    budesonide-formoterol  2 puff Inhalation BID    aspirin EC  81 mg Oral Daily    insulin glargine  25 Units SubCUTAneous Dinner    insulin lispro  0-18 Units SubCUTAneous TID WC    insulin lispro  0-9 Units SubCUTAneous Nightly    sodium chloride flush  5-40 mL IntraVENous 2 times per day    sodium zirconium cyclosilicate  10 g Oral TID    cefTRIAXone (ROCEPHIN) IV  1,000 mg IntraVENous Q24H    vancomycin (VANCOCIN) intermittent dosing (placeholder)   Other RX Placeholder    vancomycin  750 mg IntraVENous Q12H           [facility]  Toni Presley MD  [employer] messaging  OFFICE: [phone]      Electronically signed by Toni Presley MD on 11/27/2021 at 10:54 AM  Thank you for allowing us to participate in the care of this patient. Please call with questions. This note iscreated with the assistance of a speech recognition program.  While intending to generate a document that actually reflects the content of the visit, the document can still have some errors including those of syntax andsound a like substitutions which may escape proof reading. In such instances, actual meaning can be extrapolated by contextual diversion.

## 2021-11-27 NOTE — PLAN OF CARE
Problem: Falls - Risk of:  Goal: Will remain free from falls  Description: Will remain free from falls  11/27/2021 0237 by Anais Lewis RN  Outcome: Ongoing  Note: Patient is a fall risk during this admission. Fall risk assessment was performed. Patient is absent of falls. Bed is in the lowest position. Wheels on the bed are locked. Call light and bed side table are within reach. Clutter is removed. Patient was educated to call out when needing assistance or wanting to get out of bed. Patient offered toileting assistance during rounding. Hourly rounds have been performed.        Problem: Falls - Risk of:  Goal: Absence of physical injury  Description: Absence of physical injury  11/27/2021 0237 by Anais Lewis RN  Outcome: Ongoing     Problem: Skin Integrity:  Goal: Will show no infection signs and symptoms  Description: Will show no infection signs and symptoms  11/27/2021 0237 by Anais Lewis RN  Outcome: Ongoing     Problem: Skin Integrity:  Goal: Absence of new skin breakdown  Description: Absence of new skin breakdown  11/27/2021 0237 by Anais Lewis RN  Outcome: Ongoing     Problem: Physical Regulation:  Goal: Will remain free from infection  Description: Will remain free from infection  11/27/2021 0237 by Anais Lewis RN  Outcome: Ongoing     Problem: Respiratory:  Goal: Ability to maintain normal respiratory secretions will improve  Description: Ability to maintain normal respiratory secretions will improve  11/27/2021 0237 by Anais Lewis RN  Outcome: Ongoing     Problem: Metabolic:  Goal: Ability to maintain appropriate glucose levels will improve  Description: Ability to maintain appropriate glucose levels will improve  11/27/2021 0237 by Anais Lewis RN  Outcome: Ongoing     Problem: Pain:  Goal: Pain level will decrease  Description: Pain level will decrease  11/27/2021 0237 by Anais Lewis RN  Outcome: Ongoing     Problem: Pain:  Goal: Control of acute pain  Description: Control of acute pain  11/27/2021 0237 by Avi Torrez RN  Outcome: Ongoing     Problem: Pain:  Goal: Control of chronic pain  Description: Control of chronic pain  11/27/2021 0237 by Avi Torrez RN  Outcome: Ongoing
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
## 2021-11-27 NOTE — CARE COORDINATION
Discharge Planning:    Informed SW that [facility] denied admission last night when STA nurse attempted to give report. Writer and Celena/SW in to see patient to discuss d/c plans. Patient agreeable to SNF and one that is in network with insurance.

## 2021-11-27 NOTE — RT PROTOCOL NOTE
RT Inhaler-Nebulizer Bronchodilator Protocol Note    There is a bronchodilator order in the chart from a provider indicating to follow the RT Bronchodilator Protocol and there is an Initiate RT Inhaler-Nebulizer Bronchodilator Protocol order as well (see protocol at bottom of note). CXR Findings:  No results found. The findings from the last RT Protocol Assessment were as follows:   History Pulmonary Disease: Chronic pulmonary disease  Respiratory Pattern: Regular pattern and RR 12-20 bpm  Breath Sounds: Clear breath sounds  Cough: Strong, spontaneous, non-productive  Indication for Bronchodilator Therapy: On home bronchodilators  Bronchodilator Assessment Score: 2    Aerosolized bronchodilator medication orders have been revised according to the RT Inhaler-Nebulizer Bronchodilator Protocol below. Respiratory Therapist to perform RT Therapy Protocol Assessment initially then follow the protocol. Repeat RT Therapy Protocol Assessment PRN for score 0-3 or on second treatment, BID, and PRN for scores above 3. No Indications - adjust the frequency to every 6 hours PRN wheezing or bronchospasm, if no treatments needed after 48 hours then discontinue using Per Protocol order mode. If indication present, adjust the RT bronchodilator orders based on the Bronchodilator Assessment Score as indicated below. Use Inhaler orders unless patient has one or more of the following: on home nebulizer, not able to hold breath for 10 seconds, is not alert and oriented, cannot activate and use MDI correctly, or respiratory rate 25 breaths per minute or more, then use the equivalent nebulizer order(s) with same Frequency and PRN reasons based on the score. If a patient is on this medication at home then do not decrease Frequency below that used at home.     0-3 - enter or revise RT bronchodilator order(s) to equivalent RT Bronchodilator order with Frequency of every 4 hours PRN for wheezing or increased work of breathing using Per Protocol order mode. 4-6 - enter or revise RT Bronchodilator order(s) to two equivalent RT bronchodilator orders with one order with BID Frequency and one order with Frequency of every 4 hours PRN wheezing or increased work of breathing using Per Protocol order mode. 7-10 - enter or revise RT Bronchodilator order(s) to two equivalent RT bronchodilator orders with one order with TID Frequency and one order with Frequency of every 4 hours PRN wheezing or increased work of breathing using Per Protocol order mode. 11-13 - enter or revise RT Bronchodilator order(s) to one equivalent RT bronchodilator order with QID Frequency and an Albuterol order with Frequency of every 4 hours PRN wheezing or increased work of breathing using Per Protocol order mode. Greater than 13 - enter or revise RT Bronchodilator order(s) to one equivalent RT bronchodilator order with every 4 hours Frequency and an Albuterol order with Frequency of every 2 hours PRN wheezing or increased work of breathing using Per Protocol order mode. RT to enter RT Home Evaluation for COPD & MDI Assessment order using Per Protocol order mode.     Electronically signed by Rosanna Puente RCP on 11/26/2021 at 7:44 PM

## 2021-11-27 NOTE — CARE COORDINATION
Social work: when pt was here last time daughter had gotten pt to agree to research the facilities near her. She provided 3 facilities names phone numbers and fax numbers. Here are those facilities:  1. [facility]   Phone: [phone]   Fax: [phone]    2. [facility]   PHone: [phone]  Fax: [phone]    3. [address] [facility]  Phone [phone]  Fax: [phone]     Currently the patient is planning to remain here. And Miguelito Albert is working on referral which pt agreed to do. If daughter talks pt into trying for snf in [facility] and [facility] have working phone numbers. Called to verify if they can consider [facility] health plan. If not there is no point in going any farther. Dr. Chiu did talk to daughter about the transport and what care would be needed in route. Daughter wants snf at that end to accept but does not seem to understand the precert process. Will see what the referral to [facility] can do. If pt wishes to remain here and that referral is accepted and precert is granted she can continue to work on the long term plan for pt to go there with her.   Adan Ellison [facility]

## 2021-11-27 NOTE — CARE COORDINATION
Social work: spoke to pt with care coordinator. Pt was agreeable to try [facility] and any other snf's in [place] that are in his network. Asked if he was planning to go with his daughter to [place] and he said \"NO NOT NOW\". WHICH has been his response routinely each admission. asked pt today why won't you apply for Medicaid it would help if you wanted to go home with IV therapy or other snf's if he ever had to stay longer term for healing. He stated, \"no because I have antique cars\". \"I won't be eligible because of those\". Asked pt if he would like to prioritize his health over the cars. He said \"yes\". But no plans are in place for any movement on Medicaid. Daughter called and wanted referrals sent to some snf's in her home area. That was tried on another admission one phone number she provided was not in service another declined the referral.  However the daughter has no plan on how to get him to snf in that area. At the time of the last referral he had a wound vac on. Travel by car due to pt's amputations of both legs appears to be difficult for someone who may not have any training. She asked about an ambulance and informed her that they will only (under insurance )pay to snf facilities in the same city area. They will not pay for a patient to go to a snf in [place] via ambulance. Provided daughter phone number to ambulance company and then she stopped saying that was possible. However she still states that she can just come and get him if a refereral is sent to the facilities names she provided. We can try that again if the phone numbers are listed in the notes. But suspect they will again decline. As if a patient has insurance that has been utilized to some degree for snf's in the last 60 days and pt has not been out of all facilities the pocket of Medicare usually does not fill back up.  So asking ano out of state snf to get a precert for a pt who has uses some of his 100 snf days and

## 2021-11-28 LAB
GLUCOSE BLD-MCNC: 128 MG/DL (ref 75–110)
GLUCOSE BLD-MCNC: 138 MG/DL (ref 75–110)
GLUCOSE BLD-MCNC: 160 MG/DL (ref 75–110)
GLUCOSE BLD-MCNC: 255 MG/DL (ref 75–110)

## 2021-11-28 PROCEDURE — 94640 AIRWAY INHALATION TREATMENT: CPT

## 2021-11-28 PROCEDURE — 82947 ASSAY GLUCOSE BLOOD QUANT: CPT

## 2021-11-28 PROCEDURE — 6370000000 HC RX 637 (ALT 250 FOR IP): Performed by: INTERNAL MEDICINE

## 2021-11-28 PROCEDURE — 99232 SBSQ HOSP IP/OBS MODERATE 35: CPT | Performed by: INTERNAL MEDICINE

## 2021-11-28 PROCEDURE — 1200000000 HC SEMI PRIVATE

## 2021-11-28 PROCEDURE — 2580000003 HC RX 258: Performed by: INTERNAL MEDICINE

## 2021-11-28 PROCEDURE — 6360000002 HC RX W HCPCS: Performed by: INTERNAL MEDICINE

## 2021-11-28 PROCEDURE — 6370000000 HC RX 637 (ALT 250 FOR IP): Performed by: NURSE PRACTITIONER

## 2021-11-28 RX ADMIN — Medication 5 ML: at 12:53

## 2021-11-28 RX ADMIN — FERROUS SULFATE TAB EC 325 MG (65 MG FE EQUIVALENT) 325 MG: 325 (65 FE) TABLET DELAYED RESPONSE at 22:32

## 2021-11-28 RX ADMIN — FAMOTIDINE 20 MG: 20 TABLET ORAL at 09:18

## 2021-11-28 RX ADMIN — DOCUSATE SODIUM 100 MG: 100 CAPSULE, LIQUID FILLED ORAL at 09:09

## 2021-11-28 RX ADMIN — METFORMIN HYDROCHLORIDE 500 MG: 500 TABLET ORAL at 09:17

## 2021-11-28 RX ADMIN — Medication 10 ML: at 19:33

## 2021-11-28 RX ADMIN — PROBIOTIC PRODUCT - TAB 1 TABLET: TAB at 08:55

## 2021-11-28 RX ADMIN — METOPROLOL TARTRATE 25 MG: 25 TABLET, FILM COATED ORAL at 22:32

## 2021-11-28 RX ADMIN — ATORVASTATIN CALCIUM 10 MG: 10 TABLET, FILM COATED ORAL at 22:33

## 2021-11-28 RX ADMIN — INSULIN LISPRO 3 UNITS: 100 INJECTION, SOLUTION INTRAVENOUS; SUBCUTANEOUS at 12:52

## 2021-11-28 RX ADMIN — MORPHINE SULFATE 4 MG: 4 INJECTION INTRAVENOUS at 22:33

## 2021-11-28 RX ADMIN — CEFTRIAXONE SODIUM 1000 MG: 1 INJECTION, POWDER, FOR SOLUTION INTRAMUSCULAR; INTRAVENOUS at 12:54

## 2021-11-28 RX ADMIN — METFORMIN HYDROCHLORIDE 500 MG: 500 TABLET ORAL at 17:11

## 2021-11-28 RX ADMIN — FERROUS SULFATE TAB EC 325 MG (65 MG FE EQUIVALENT) 325 MG: 325 (65 FE) TABLET DELAYED RESPONSE at 09:10

## 2021-11-28 RX ADMIN — Medication 3 MG: at 22:32

## 2021-11-28 RX ADMIN — Medication 5 ML: at 17:11

## 2021-11-28 RX ADMIN — SODIUM ZIRCONIUM CYCLOSILICATE 10 G: 10 POWDER, FOR SUSPENSION ORAL at 12:55

## 2021-11-28 RX ADMIN — PROBIOTIC PRODUCT - TAB 1 TABLET: TAB at 22:32

## 2021-11-28 RX ADMIN — INSULIN GLARGINE 25 UNITS: 100 INJECTION, SOLUTION SUBCUTANEOUS at 17:12

## 2021-11-28 RX ADMIN — TAMSULOSIN HYDROCHLORIDE 0.4 MG: 0.4 CAPSULE ORAL at 22:33

## 2021-11-28 RX ADMIN — SODIUM ZIRCONIUM CYCLOSILICATE 10 G: 10 POWDER, FOR SUSPENSION ORAL at 22:46

## 2021-11-28 RX ADMIN — MULTIPLE VITAMINS W/ MINERALS TAB 1 TABLET: TAB at 09:10

## 2021-11-28 RX ADMIN — PREGABALIN 75 MG: 75 CAPSULE ORAL at 12:54

## 2021-11-28 RX ADMIN — NALOXEGOL OXALATE 25 MG: 25 TABLET, FILM COATED ORAL at 09:10

## 2021-11-28 RX ADMIN — PREGABALIN 75 MG: 75 CAPSULE ORAL at 22:32

## 2021-11-28 RX ADMIN — SODIUM ZIRCONIUM CYCLOSILICATE 10 G: 10 POWDER, FOR SUSPENSION ORAL at 09:10

## 2021-11-28 RX ADMIN — Medication 5 ML: at 08:52

## 2021-11-28 RX ADMIN — VANCOMYCIN HYDROCHLORIDE 750 MG: 750 INJECTION, POWDER, LYOPHILIZED, FOR SOLUTION INTRAVENOUS at 17:11

## 2021-11-28 RX ADMIN — SODIUM CHLORIDE, PRESERVATIVE FREE 10 ML: 5 INJECTION INTRAVENOUS at 22:33

## 2021-11-28 RX ADMIN — VANCOMYCIN HYDROCHLORIDE 750 MG: 750 INJECTION, POWDER, LYOPHILIZED, FOR SOLUTION INTRAVENOUS at 03:02

## 2021-11-28 RX ADMIN — APIXABAN 5 MG: 5 TABLET, FILM COATED ORAL at 09:09

## 2021-11-28 RX ADMIN — SODIUM CHLORIDE, PRESERVATIVE FREE 10 ML: 5 INJECTION INTRAVENOUS at 09:20

## 2021-11-28 RX ADMIN — HYDROCORTISONE: 25 CREAM TOPICAL at 09:12

## 2021-11-28 RX ADMIN — DOCUSATE SODIUM 100 MG: 100 CAPSULE, LIQUID FILLED ORAL at 22:32

## 2021-11-28 RX ADMIN — FAMOTIDINE 20 MG: 20 TABLET ORAL at 22:33

## 2021-11-28 RX ADMIN — BUDESONIDE AND FORMOTEROL FUMARATE DIHYDRATE 2 PUFF: 160; 4.5 AEROSOL RESPIRATORY (INHALATION) at 20:49

## 2021-11-28 RX ADMIN — AMITRIPTYLINE HYDROCHLORIDE 75 MG: 25 TABLET, FILM COATED ORAL at 22:32

## 2021-11-28 RX ADMIN — APIXABAN 5 MG: 5 TABLET, FILM COATED ORAL at 22:33

## 2021-11-28 RX ADMIN — Medication: at 09:11

## 2021-11-28 RX ADMIN — ASPIRIN 81 MG: 81 TABLET, COATED ORAL at 09:18

## 2021-11-28 RX ADMIN — PREGABALIN 75 MG: 75 CAPSULE ORAL at 09:09

## 2021-11-28 RX ADMIN — INSULIN LISPRO 5 UNITS: 100 INJECTION, SOLUTION INTRAVENOUS; SUBCUTANEOUS at 22:31

## 2021-11-28 RX ADMIN — HYDROCORTISONE: 25 CREAM TOPICAL at 22:31

## 2021-11-28 ASSESSMENT — PAIN DESCRIPTION - PAIN TYPE
TYPE: CHRONIC PAIN
TYPE: ACUTE PAIN;CHRONIC PAIN
TYPE: ACUTE PAIN

## 2021-11-28 ASSESSMENT — PAIN DESCRIPTION - FREQUENCY
FREQUENCY: CONTINUOUS

## 2021-11-28 ASSESSMENT — ENCOUNTER SYMPTOMS
GASTROINTESTINAL NEGATIVE: 1
RESPIRATORY NEGATIVE: 1
ALLERGIC/IMMUNOLOGIC NEGATIVE: 1

## 2021-11-28 ASSESSMENT — PAIN SCALES - GENERAL
PAINLEVEL_OUTOF10: 8
PAINLEVEL_OUTOF10: 6
PAINLEVEL_OUTOF10: 9
PAINLEVEL_OUTOF10: 9

## 2021-11-28 ASSESSMENT — PAIN - FUNCTIONAL ASSESSMENT
PAIN_FUNCTIONAL_ASSESSMENT: PREVENTS OR INTERFERES SOME ACTIVE ACTIVITIES AND ADLS

## 2021-11-28 ASSESSMENT — PAIN DESCRIPTION - DESCRIPTORS
DESCRIPTORS: ACHING

## 2021-11-28 ASSESSMENT — PAIN DESCRIPTION - LOCATION
LOCATION: LEG

## 2021-11-28 ASSESSMENT — PAIN DESCRIPTION - ONSET
ONSET: PROGRESSIVE
ONSET: ON-GOING

## 2021-11-28 ASSESSMENT — PAIN DESCRIPTION - PROGRESSION
CLINICAL_PROGRESSION: NOT CHANGED

## 2021-11-28 ASSESSMENT — PAIN DESCRIPTION - ORIENTATION
ORIENTATION: LEFT
ORIENTATION: LEFT

## 2021-11-28 NOTE — PLAN OF CARE
Problem: Falls - Risk of:  Goal: Will remain free from falls  Description: Will remain free from falls  11/28/2021 1516 by Rosalino William RN  Outcome: Ongoing  11/28/2021 1510 by Rosalino William RN  Outcome: Ongoing  11/28/2021 0257 by Eloy Myers RN  Outcome: Ongoing  Note: Siderails up x 2  Hourly rounding  Call light in reach  Instructed to call for assist before attempting out of bed.   Remains free from falls and accidental injury at this time   Floor free from obstacles  Bed is locked and in lowest position  Adequate lighting provided  Bed alarm on, [place] star and Stay with Me signs posted      Goal: Absence of physical injury  Description: Absence of physical injury  11/28/2021 1516 by Rosalino William RN  Outcome: Ongoing  11/28/2021 1510 by Rosalino William RN  Outcome: Ongoing  11/28/2021 0257 by Eloy Myers RN  Outcome: Ongoing     Problem: Skin Integrity:  Goal: Will show no infection signs and symptoms  Description: Will show no infection signs and symptoms  11/28/2021 1516 by Rosalino William RN  Outcome: Ongoing  11/28/2021 1510 by Rosalino William RN  Outcome: Ongoing  11/28/2021 0257 by Eloy Myers RN  Outcome: Ongoing  Goal: Absence of new skin breakdown  Description: Absence of new skin breakdown  11/28/2021 1516 by Rosalino William RN  Outcome: Ongoing  11/28/2021 1510 by Rosalino William RN  Outcome: Ongoing  11/28/2021 0257 by Eloy Myers RN  Outcome: Ongoing     Problem: Physical Regulation:  Goal: Will remain free from infection  Description: Will remain free from infection  11/28/2021 1516 by Rosalino William RN  Outcome: Ongoing  11/28/2021 1510 by Rosalino William RN  Outcome: Ongoing  11/28/2021 0257 by Eloy Myers RN  Outcome: Ongoing     Problem: Respiratory:  Goal: Ability to maintain normal respiratory secretions will improve  Description: Ability to maintain normal respiratory secretions will improve  11/28/2021 1516 by Rosalino William RN  Outcome: Ongoing  11/28/2021 1510 by Rosalino William RN  Outcome: Ongoing  11/28/2021 0257 by Eloy Myers RN  Outcome: Ongoing     Problem: Metabolic:  Goal: Ability to maintain appropriate glucose levels will improve  Description: Ability to maintain appropriate glucose levels will improve  11/28/2021 1516 by Rosalino William RN  Outcome: Ongoing  11/28/2021 1510 by Rosalino William RN  Outcome: Ongoing  11/28/2021 0257 by Eloy Myers RN  Outcome: Ongoing     Problem: Pain:  Goal: Pain level will decrease  Description: Pain level will decrease  11/28/2021 1516 by Rosalino William RN  Outcome: Ongoing  11/28/2021 1510 by Rosalino William RN  Outcome: Ongoing  11/28/2021 0257 by Eloy Myers RN  Outcome: Ongoing  Note: Pain level assessment complete.    Patient educated on pain scale and control interventions  PRN pain medication given per patient request  Patient instructed to call out with new onset of pain or unrelieved pain      Goal: Control of acute pain  Description: Control of acute pain  11/28/2021 1516 by Rosalino William RN  Outcome: Ongoing  11/28/2021 1510 by Rosalino William RN  Outcome: Ongoing  11/28/2021 0257 by Eloy Myers RN  Outcome: Ongoing  Goal: Control of chronic pain  Description: Control of chronic pain  11/28/2021 1516 by Rosalino William RN  Outcome: Ongoing  11/28/2021 1510 by Rosalino William RN  Outcome: Ongoing  11/28/2021 0257 by Eloy Myers RN  Outcome: Ongoing

## 2021-11-28 NOTE — PLAN OF CARE
Problem: Skin Integrity:  Goal: Will show no infection signs and symptoms  Description: Will show no infection signs and symptoms  11/28/2021 0257 by Florecita Vinson RN  Outcome: Ongoing     Problem: Pain:  Goal: Pain level will decrease  Description: Pain level will decrease  11/28/2021 0257 by Florecita Vinson RN  Outcome: Ongoing  Note: Pain level assessment complete.    Patient educated on pain scale and control interventions  PRN pain medication given per patient request  Patient instructed to call out with new onset of pain or unrelieved pain
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
## 2021-11-28 NOTE — FLOWSHEET NOTE
patient  Was busy.  shared in silent prayer.    leaves prayer card for possible follow up.     11/28/21 1801   Encounter Summary   Services provided to: Patient not available   Referral/Consult From: [facility]   (11-28-21 PT: busy)   Complexity of Encounter Low   Length of Encounter 15 minutes   Routine   Type Initial   Assessment Unable to respond   Intervention Prayer

## 2021-11-28 NOTE — PROGRESS NOTES
[facility]  Progress Note    Amy Ocasio  MRN: [id number]  Date: 11/28/2021  LOS: 2     Reason for F/U :   Adverse drug reaction    Impression :   1. 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 Hyperkalemia concern for adverse effect secondary to ceftaroline  3. Right leg rash likely contact dermatitis to prosthesis  4. Diabetes mellitus with associated neuropathy  5. Peripheral arterial disease  6. COPD      Recommendations:     · IV Rocephin and vancomycin through December 8, 2021  · Follow CBC, BUN/creatinine and liver enzymes weekly while on IV antibiotics  · No growth on blood or urine cultures from 11/25/2021  · The patient may be discharged from infectious disease point of view    Infection Control Recommendations:   Tach precautions    Discharge Planning:   Estimated Length of IV antimicrobials:   Patient has a PICC line Insertion  Patient will need: Home IV , [place],  SNF,  LTAC: Undetermined  Patient willneed outpatient wound care: No    Medical Decision making / Summary of Stay:   Amy Ocasio is a 61y.o.-year-old male who was initially admitted on 11/25/2021. Issa Aguilera is well-known to me and has a history of peripheral arterial disease, COPD, diabetes mellitus with associated neuropathy, history of esophageal cancer among other medical problems. He was recently hospitalized here for a left BKA stump infected hematoma with osteomyelitis of the residual tibia. The patient did have MRSA/MSSA and Klebsiella Aerogenes and [facility] which he is scheduled to continue through 12/8/2021. The patient has been having diarrhea as well as lab testing showing increasing potassium variations that have been refractory to [facility]. The concern was that the ceftaroline could potentially be causing the hyperkalemia.       Current evaluation:2021    BP (!) 109/43   Pulse 67   Temp 97.8 °F (36.6 °C) (Oral)   Resp 18   Ht 6' 1\" (1.854 m)   Wt 157 lb 1.6 oz (71.3 kg)   SpO2 96%   BMI 20.73 kg/m²     Temperature Range: Temp: 97.8 °F (36.6 °C) Temp  Av.6 °F (36.4 °C)  Min: 97.5 °F (36.4 °C)  Max: 97.8 °F (36.6 °C)  The patient is seen and evaluated at bedside and is awake and alert in no acute distress. He denied significant pain today, denied fever or chills, denied nausea or vomiting, no diarrhea no other complaint. Right leg rash improved /on hydrocortisone cream.    Review of Systems   Constitutional: Negative. Respiratory: Negative. Cardiovascular: Negative. Gastrointestinal: Negative. Genitourinary: Negative. Musculoskeletal: Negative. Allergic/Immunologic: Negative. Neurological: Negative. Physical Examination :     Physical Exam  Constitutional:       Appearance: He is well-developed. HENT:      Head: Normocephalic and atraumatic. Cardiovascular:      Rate and Rhythm: Normal rate. Heart sounds: Normal heart sounds. No friction rub. No gallop. Pulmonary:      Effort: Pulmonary effort is normal.      Breath sounds: Normal breath sounds. No wheezing. Abdominal:      General: Bowel sounds are normal.      Palpations: Abdomen is soft. There is no mass. Tenderness: There is no abdominal tenderness. Musculoskeletal:      Cervical back: Neck supple. Comments: Bilateral below the knee amputations   Lymphadenopathy:      Cervical: No cervical adenopathy. Skin:     General: Skin is warm and dry. Comments: Left BKA stump with open wound and necrotic scab. Right BKA stump with maculopapular rash to the anterior thigh improved from yesterday. Neurological:      Mental Status: He is alert and oriented to person, place, and time.          Laboratory data:   I have independently reviewed the followinglabs:  CBC with Differential:   Recent Labs     21  0634   WBC 7.7 HGB 9.7*   HCT 33.1*      LYMPHOPCT 18*   MONOPCT 8     BMP:   Recent Labs     11/25/21  1409 11/26/21  0634    141   K 4.8 4.6    108*   CO2 21 22   BUN 26* 22   CREATININE 0.88 0.97     Hepatic Function Panel:   No results for input(s): PROT, LABALBU, BILIDIR, IBILI, BILITOT, ALKPHOS, ALT, AST in the last 72 hours. Lab Results   Component Value Date    PROCAL 0.13 05/25/2021    PROCAL 0.11 05/25/2021     Lab Results   Component Value Date    .7 11/04/2021    CRP 91.6 04/05/2021    .0 02/03/2021     Lab Results   Component Value Date    SEDRATE 97 (H) 11/04/2021         Lab Results   Component Value Date    DDIMER 0.39 06/29/2020    DDIMER 1.63 12/20/2017    DDIMER 0.68 12/25/2011     Lab Results   Component Value Date    FERRITIN 56 02/09/2021    FERRITIN 64 01/25/2014     No results found for: LDH  No results found for: FIBRINOGEN    Results in Past 30 Days  Result Component Current Result Ref Range Previous Result Ref Range   SARS-CoV-2, Rapid Not Detected (11/3/2021) Not Detected Not in Time Range      Lab Results   Component Value Date    COVID19 Not Detected 11/03/2021    COVID19 Not Detected 09/02/2021    COVID19 Not Detected 08/29/2021    COVID19 Not Detected 08/23/2020       No results for input(s): VANCOTROUGH in the last 72 hours. Imaging Studies:   ONE XRAY VIEW OF THE CHEST 11/25/2021 9:31 am  Impression   Linear opacities in the right lung base compatible with subsegmental   atelectasis.  No consolidation identified.           Cultures:     Culture, Urine [[id number]] Collected: 11/25/21 1134   Order Status: Completed Specimen: Urine, clean catch Updated: 11/26/21 1147    Specimen Description . CLEAN CATCH URINE    Special Requests NOT REPORTED    Culture NO GROWTH   Culture, Blood 1 [[id number]] Collected: 11/25/21 0938   Order Status: Completed Specimen: Blood Updated: 11/26/21 1137    Specimen Description . BLOOD    Special Requests LT AC,10ML    Culture NO GROWTH 22 HOURS   Culture, Blood 1 [[id number]] Collected: 11/25/21 0939   Order Status: Completed Specimen: Blood Updated: 11/26/21 1138    Specimen Description . BLOOD    Special Requests RT AC,10ML    Culture NO GROWTH 22 HOURS     Medications:      amitriptyline  75 mg Oral Nightly    hydrocortisone   Topical BID    pregabalin  75 mg Oral TID    lactobacillus  1 tablet Oral BID    tamsulosin  0.4 mg Oral Nightly    therapeutic multivitamin-minerals  1 tablet Oral Daily    docusate sodium  100 mg Oral BID    atorvastatin  10 mg Oral Nightly    apixaban  5 mg Oral BID    famotidine  20 mg Oral BID    metFORMIN  500 mg Oral BID WC    ferrous sulfate  325 mg Oral BID    metoprolol tartrate  25 mg Oral BID    naloxegol  25 mg Oral QAM    budesonide-formoterol  2 puff Inhalation BID    aspirin EC  81 mg Oral Daily    insulin glargine  25 Units SubCUTAneous Dinner    insulin lispro  0-18 Units SubCUTAneous TID WC    insulin lispro  0-9 Units SubCUTAneous Nightly    sodium chloride flush  5-40 mL IntraVENous 2 times per day    sodium zirconium cyclosilicate  10 g Oral TID    cefTRIAXone (ROCEPHIN) IV  1,000 mg IntraVENous Q24H    vancomycin (VANCOCIN) intermittent dosing (placeholder)   Other RX Placeholder    vancomycin  750 mg IntraVENous Q12H           [facility]  Lesley Salazar MD  [employer] messaging  OFFICE: [phone]      Electronically signed by Lesley Salazar MD on 11/28/2021 at 10:28 AM  Thank you for allowing us to participate in the care of this patient. Please call with questions. This note iscreated with the assistance of a speech recognition program.  While intending to generate a document that actually reflects the content of the visit, the document can still have some errors including those of syntax andsound a like substitutions which may escape proof reading. In such instances, actual meaning can be extrapolated by contextual diversion.

## 2021-11-28 NOTE — PLAN OF CARE
Problem: Falls - Risk of:  Goal: Will remain free from falls  Description: Will remain free from falls  11/28/2021 1510 by Risa Krishna RN  Outcome: Ongoing  11/28/2021 0257 by Harshad Frias RN  Outcome: Ongoing  Note: Siderails up x 2  Hourly rounding  Call light in reach  Instructed to call for assist before attempting out of bed.   Remains free from falls and accidental injury at this time   Floor free from obstacles  Bed is locked and in lowest position  Adequate lighting provided  Bed alarm on, Red Falling star and Stay with Me signs posted      Goal: Absence of physical injury  Description: Absence of physical injury  11/28/2021 1510 by Risa Krishna RN  Outcome: Ongoing  11/28/2021 0257 by Harshad Frias RN  Outcome: Ongoing     Problem: Skin Integrity:  Goal: Will show no infection signs and symptoms  Description: Will show no infection signs and symptoms  11/28/2021 1510 by Risa Krishna RN  Outcome: Ongoing  11/28/2021 0257 by Harshad Frias RN  Outcome: Ongoing  Goal: Absence of new skin breakdown  Description: Absence of new skin breakdown  11/28/2021 1510 by Risa Krishna RN  Outcome: Ongoing  11/28/2021 0257 by Harshad Frias RN  Outcome: Ongoing     Problem: Physical Regulation:  Goal: Will remain free from infection  Description: Will remain free from infection  11/28/2021 1510 by Risa Krishna RN  Outcome: Ongoing  11/28/2021 0257 by Harshad Frias RN  Outcome: Ongoing     Problem: Respiratory:  Goal: Ability to maintain normal respiratory secretions will improve  Description: Ability to maintain normal respiratory secretions will improve  11/28/2021 1510 by Risa Krishna RN  Outcome: Ongoing  11/28/2021 0257 by Harshad Frias RN  Outcome: Ongoing     Problem: Metabolic:  Goal: Ability to maintain appropriate glucose levels will improve  Description: Ability to maintain appropriate glucose levels will improve  11/28/2021 1510 by Anthony Neri RN  Outcome: Ongoing  11/28/2021 0257 by Alek Quintana RN  Outcome: Ongoing     Problem: Pain:  Goal: Pain level will decrease  Description: Pain level will decrease  11/28/2021 1510 by Anthony Neri RN  Outcome: Ongoing  11/28/2021 0257 by Alek Quintana RN  Outcome: Ongoing  Note: Pain level assessment complete.    Patient educated on pain scale and control interventions  PRN pain medication given per patient request  Patient instructed to call out with new onset of pain or unrelieved pain      Goal: Control of acute pain  Description: Control of acute pain  11/28/2021 1510 by Anthony Neri RN  Outcome: Ongoing  11/28/2021 0257 by Alek Quintana RN  Outcome: Ongoing  Goal: Control of chronic pain  Description: Control of chronic pain  11/28/2021 1510 by Anthony Neri RN  Outcome: Ongoing  11/28/2021 0257 by Alek Quintana RN  Outcome: Ongoing

## 2021-11-28 NOTE — PROGRESS NOTES
[facility] Pharmacokinetic Monitoring Service - Vancomycin    Consulting Provider: Char Reed  Indication: SSTI  Target Concentration: Goal trough of 10-15 mg/L and AUC/DARIAN <500 mg*hr/L  Day of Therapy: 4  Additional Antimicrobials: Rocephin    Pertinent Laboratory Values: Wt Readings from Last 1 Encounters:   11/28/21 157 lb 1.6 oz (71.3 kg)     Temp Readings from Last 1 Encounters:   11/28/21 97.8 °F (36.6 °C) (Oral)     Procalcitonin   Date Value Ref Range Status   05/25/2021 0.13 (H) <0.09 ng/mL Final     Comment:           Suspected Sepsis:  <0.50 ng/mL     Low likelihood of sepsis. 0.50-2.00 ng/mL     Increased likelihood of sepsis. Antibiotics encouraged. >2.00 ng/mL     High risk of sepsis/shock. Antibiotics strongly encouraged. Suspected Lower Resp Tract Infections:  <0.24 ng/mL     Low likelihood of bacterial infection. >0.24 ng/mL     Increased likelihood of bacterial infection. Antibiotics encouraged. With successful antibiotic therapy, PCT levels should decrease rapidly. (Half-life of 24 to   36 hours.)        Procalcitonin values from samples collected within the first 6 hours of systemic infection   may still be low. Retesting may be indicated. Values from day 1 and day 4 can be entered into the Change in Procalcitonin Calculator   (www."[employer]s-pct-calculator. com) to determine the patient's Mortality Risk Prognosis        In healthy neonates, plasma Procalcitonin (PCT) concentrations increase gradually after   birth, reaching peak values at about 24 hours of age then decrease to normal values below   0.5 ng/mL by 48-72 hours of age. Estimated Creatinine Clearance: 78 mL/min (based on SCr of 0.97 mg/dL). Recent Labs     11/26/21  0634   CREATININE 0.97   WBC 7.7         MRSA Nasal Swab: N/A. Non-respiratory infection. .    Recent vancomycin administrations                     vancomycin (VANCOCIN) 750 mg in dextrose 5 % 250 mL IVPB (mg) 750 mg New Bag

## 2021-11-28 NOTE — PROGRESS NOTES
Patient was non-compliant after being instructed several times to not get up on his own. Patient was heard yelling out for help this morning after he had tried to move himself to the bedside commode and nearly fell. Writer and 2 PCT's rushed in and helped patient. Patient was repositioned safely on the bed and bed alarm was turned on. Patient later was helped to the commode but was noncompliant when instructed to call out when finished and wait for help before getting into bed. Patient is alert and oriented x 4 and was educated on the risk of falling. Bed alarm is now being used at all times when patient is in bed.

## 2021-11-29 LAB
C DIFF AG + TOXIN: NEGATIVE
GLUCOSE BLD-MCNC: 133 MG/DL (ref 75–110)
GLUCOSE BLD-MCNC: 166 MG/DL (ref 75–110)
GLUCOSE BLD-MCNC: 203 MG/DL (ref 75–110)
GLUCOSE BLD-MCNC: 227 MG/DL (ref 75–110)
SPECIMEN DESCRIPTION: NORMAL

## 2021-11-29 PROCEDURE — 97167 OT EVAL HIGH COMPLEX 60 MIN: CPT

## 2021-11-29 PROCEDURE — 97110 THERAPEUTIC EXERCISES: CPT

## 2021-11-29 PROCEDURE — 2580000003 HC RX 258: Performed by: INTERNAL MEDICINE

## 2021-11-29 PROCEDURE — 6370000000 HC RX 637 (ALT 250 FOR IP): Performed by: INTERNAL MEDICINE

## 2021-11-29 PROCEDURE — 6360000002 HC RX W HCPCS: Performed by: INTERNAL MEDICINE

## 2021-11-29 PROCEDURE — 82947 ASSAY GLUCOSE BLOOD QUANT: CPT

## 2021-11-29 PROCEDURE — 1200000000 HC SEMI PRIVATE

## 2021-11-29 PROCEDURE — 99232 SBSQ HOSP IP/OBS MODERATE 35: CPT | Performed by: INTERNAL MEDICINE

## 2021-11-29 PROCEDURE — 99213 OFFICE O/P EST LOW 20 MIN: CPT

## 2021-11-29 PROCEDURE — 97535 SELF CARE MNGMENT TRAINING: CPT

## 2021-11-29 RX ORDER — BENZOCAINE/MENTHOL 6 MG-10 MG
LOZENGE MUCOUS MEMBRANE 2 TIMES DAILY
Status: DISCONTINUED | OUTPATIENT
Start: 2021-11-29 | End: 2021-12-03 | Stop reason: HOSPADM

## 2021-11-29 RX ORDER — ACETAMINOPHEN 500 MG
500 TABLET ORAL EVERY 8 HOURS
Status: DISPENSED | OUTPATIENT
Start: 2021-11-29 | End: 2021-12-01

## 2021-11-29 RX ORDER — DIPHENHYDRAMINE HCL 25 MG
25 TABLET ORAL EVERY 6 HOURS PRN
Status: DISCONTINUED | OUTPATIENT
Start: 2021-11-29 | End: 2021-12-03 | Stop reason: HOSPADM

## 2021-11-29 RX ADMIN — PREGABALIN 75 MG: 75 CAPSULE ORAL at 16:31

## 2021-11-29 RX ADMIN — INSULIN LISPRO 6 UNITS: 100 INJECTION, SOLUTION INTRAVENOUS; SUBCUTANEOUS at 11:42

## 2021-11-29 RX ADMIN — SODIUM CHLORIDE, PRESERVATIVE FREE 10 ML: 5 INJECTION INTRAVENOUS at 21:08

## 2021-11-29 RX ADMIN — APIXABAN 5 MG: 5 TABLET, FILM COATED ORAL at 09:14

## 2021-11-29 RX ADMIN — FAMOTIDINE 20 MG: 20 TABLET ORAL at 09:14

## 2021-11-29 RX ADMIN — NALOXEGOL OXALATE 25 MG: 25 TABLET, FILM COATED ORAL at 09:14

## 2021-11-29 RX ADMIN — PROBIOTIC PRODUCT - TAB 1 TABLET: TAB at 21:04

## 2021-11-29 RX ADMIN — INSULIN GLARGINE 25 UNITS: 100 INJECTION, SOLUTION SUBCUTANEOUS at 17:09

## 2021-11-29 RX ADMIN — MULTIPLE VITAMINS W/ MINERALS TAB 1 TABLET: TAB at 09:14

## 2021-11-29 RX ADMIN — FERROUS SULFATE TAB EC 325 MG (65 MG FE EQUIVALENT) 325 MG: 325 (65 FE) TABLET DELAYED RESPONSE at 09:15

## 2021-11-29 RX ADMIN — PREGABALIN 75 MG: 75 CAPSULE ORAL at 09:14

## 2021-11-29 RX ADMIN — TAMSULOSIN HYDROCHLORIDE 0.4 MG: 0.4 CAPSULE ORAL at 21:04

## 2021-11-29 RX ADMIN — INSULIN LISPRO 3 UNITS: 100 INJECTION, SOLUTION INTRAVENOUS; SUBCUTANEOUS at 16:31

## 2021-11-29 RX ADMIN — Medication 10 ML: at 21:03

## 2021-11-29 RX ADMIN — PROBIOTIC PRODUCT - TAB 1 TABLET: TAB at 09:13

## 2021-11-29 RX ADMIN — Medication 10 ML: at 17:09

## 2021-11-29 RX ADMIN — MORPHINE SULFATE 4 MG: 4 INJECTION INTRAVENOUS at 19:33

## 2021-11-29 RX ADMIN — SODIUM CHLORIDE, PRESERVATIVE FREE 10 ML: 5 INJECTION INTRAVENOUS at 09:15

## 2021-11-29 RX ADMIN — MORPHINE SULFATE 4 MG: 4 INJECTION INTRAVENOUS at 15:42

## 2021-11-29 RX ADMIN — ASPIRIN 81 MG: 81 TABLET, COATED ORAL at 09:14

## 2021-11-29 RX ADMIN — FERROUS SULFATE TAB EC 325 MG (65 MG FE EQUIVALENT) 325 MG: 325 (65 FE) TABLET DELAYED RESPONSE at 21:04

## 2021-11-29 RX ADMIN — PREGABALIN 75 MG: 75 CAPSULE ORAL at 21:04

## 2021-11-29 RX ADMIN — APIXABAN 5 MG: 5 TABLET, FILM COATED ORAL at 21:04

## 2021-11-29 RX ADMIN — MORPHINE SULFATE 4 MG: 4 INJECTION INTRAVENOUS at 11:32

## 2021-11-29 RX ADMIN — Medication 10 ML: at 09:13

## 2021-11-29 RX ADMIN — VANCOMYCIN HYDROCHLORIDE 750 MG: 750 INJECTION, POWDER, LYOPHILIZED, FOR SOLUTION INTRAVENOUS at 03:00

## 2021-11-29 RX ADMIN — INSULIN LISPRO 2 UNITS: 100 INJECTION, SOLUTION INTRAVENOUS; SUBCUTANEOUS at 21:17

## 2021-11-29 RX ADMIN — Medication 10 ML: at 04:29

## 2021-11-29 RX ADMIN — ATORVASTATIN CALCIUM 10 MG: 10 TABLET, FILM COATED ORAL at 21:04

## 2021-11-29 RX ADMIN — METFORMIN HYDROCHLORIDE 500 MG: 500 TABLET ORAL at 09:28

## 2021-11-29 RX ADMIN — HYDROCORTISONE: 0.01 CREAM TOPICAL at 21:09

## 2021-11-29 RX ADMIN — CEFTRIAXONE SODIUM 1000 MG: 1 INJECTION, POWDER, FOR SOLUTION INTRAMUSCULAR; INTRAVENOUS at 15:43

## 2021-11-29 RX ADMIN — METFORMIN HYDROCHLORIDE 500 MG: 500 TABLET ORAL at 16:31

## 2021-11-29 RX ADMIN — Medication 10 ML: at 00:03

## 2021-11-29 RX ADMIN — AMITRIPTYLINE HYDROCHLORIDE 75 MG: 25 TABLET, FILM COATED ORAL at 21:04

## 2021-11-29 RX ADMIN — ACETAMINOPHEN 500 MG: 500 TABLET ORAL at 17:09

## 2021-11-29 RX ADMIN — FAMOTIDINE 20 MG: 20 TABLET ORAL at 21:04

## 2021-11-29 RX ADMIN — DOCUSATE SODIUM 100 MG: 100 CAPSULE, LIQUID FILLED ORAL at 09:14

## 2021-11-29 RX ADMIN — Medication: at 16:32

## 2021-11-29 RX ADMIN — VANCOMYCIN HYDROCHLORIDE 750 MG: 750 INJECTION, POWDER, LYOPHILIZED, FOR SOLUTION INTRAVENOUS at 16:39

## 2021-11-29 RX ADMIN — HYDROCORTISONE: 25 CREAM TOPICAL at 09:17

## 2021-11-29 RX ADMIN — DOCUSATE SODIUM 100 MG: 100 CAPSULE, LIQUID FILLED ORAL at 21:04

## 2021-11-29 RX ADMIN — METOPROLOL TARTRATE 25 MG: 25 TABLET, FILM COATED ORAL at 21:04

## 2021-11-29 ASSESSMENT — PAIN DESCRIPTION - PROGRESSION

## 2021-11-29 ASSESSMENT — PAIN SCALES - GENERAL
PAINLEVEL_OUTOF10: 8
PAINLEVEL_OUTOF10: 7
PAINLEVEL_OUTOF10: 8
PAINLEVEL_OUTOF10: 4
PAINLEVEL_OUTOF10: 5
PAINLEVEL_OUTOF10: 7

## 2021-11-29 ASSESSMENT — ENCOUNTER SYMPTOMS
GASTROINTESTINAL NEGATIVE: 1
ALLERGIC/IMMUNOLOGIC NEGATIVE: 1
RESPIRATORY NEGATIVE: 1

## 2021-11-29 ASSESSMENT — PAIN DESCRIPTION - PAIN TYPE: TYPE: SURGICAL PAIN;ACUTE PAIN;PHANTOM PAIN

## 2021-11-29 ASSESSMENT — PAIN DESCRIPTION - LOCATION: LOCATION: LEG

## 2021-11-29 ASSESSMENT — PAIN DESCRIPTION - ORIENTATION: ORIENTATION: RIGHT;LEFT

## 2021-11-29 NOTE — CARE COORDINATION
Social Work-Received phone call from daughter. She states that she has a call into [facility]  regarding his denial. Met with patient to discuss other options that are in network with [facility]. He would like referral sent to [address] [place] [address].  Sent referral. Sandeep Schaefer

## 2021-11-29 NOTE — PROGRESS NOTES
[facility]  Progress Note    Lucille Marie  MRN: [id number]  Date: 11/29/2021  LOS: 3     Reason for F/U :   Adverse drug reaction    Impression :   1. 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 Hyperkalemia concern for adverse effect secondary to ceftaroline  3. Diarrhea likely antibiotic induced but will need to rule out C. difficile infection  4. Diabetes mellitus with associated neuropathy  5. Peripheral arterial disease  6. COPD      Recommendations:   · The hyperkalemia has resolved. · Continue intravenous antimicrobial therapy with Rocephin and vancomycin through December 8, 2021  · Continue local wound care. · The patient was denied to go back to acute rehabilitation and placement is the issue at this time    Infection Control Recommendations:   Tach precautions    Discharge Planning:   Estimated Length of IV antimicrobials: 12/8/2021 Patient has a PICC line Insertion  Patient will need: Home IV , [place],  SNF,  LTAC: Undetermined  Patient willneed outpatient wound care: No    Medical Decision making / Summary of Stay:   Lucille Marie is a 61y.o.-year-old male who was initially admitted on 11/25/2021. Vivi Medina is well-known to me and has a history of peripheral arterial disease, COPD, diabetes mellitus with associated neuropathy, history of esophageal cancer among other medical problems. He was recently hospitalized here for a left BKA stump infected hematoma with osteomyelitis of the residual tibia. The patient did have MRSA/MSSA and Klebsiella Aerogenes and [facility] which he is scheduled to continue through 12/8/2021. The patient has been having diarrhea as well as lab testing showing increasing potassium variations that have been refractory to [facility].   The concern was that the ceftaroline could potentially be causing the hyperkalemia.     The patient reports that every time he eats he has diarrhea. He reports that the stools are watery. He will not really quantify how many times he is going to the bathroom. Current evaluation:2021    BP (!) 118/57   Pulse 88   Temp 97.9 °F (36.6 °C) (Oral)   Resp 18   Ht 6' 1\" (1.854 m)   Wt 154 lb (69.9 kg)   SpO2 95%   BMI 20.32 kg/m²     Temperature Range: Temp: 97.9 °F (36.6 °C) Temp  Av.9 °F (36.6 °C)  Min: 97.7 °F (36.5 °C)  Max: 97.9 °F (36.6 °C)  The patient is seen and evaluated at bedside and is awake and alert in no acute distress. No subjective fevers or chills. No abdominal pain nausea vomiting or diarrhea. Review of Systems   Constitutional: Negative. Respiratory: Negative. Cardiovascular: Negative. Gastrointestinal: Negative. Genitourinary: Negative. Musculoskeletal: Negative. Skin: Positive for wound. Allergic/Immunologic: Negative. Neurological: Negative. Physical Examination :     Physical Exam  Constitutional:       Appearance: He is well-developed. HENT:      Head: Normocephalic and atraumatic. Cardiovascular:      Rate and Rhythm: Normal rate. Heart sounds: Normal heart sounds. No friction rub. No gallop. Pulmonary:      Effort: Pulmonary effort is normal.      Breath sounds: Normal breath sounds. No wheezing. Abdominal:      General: Bowel sounds are normal.      Palpations: Abdomen is soft. There is no mass. Tenderness: There is no abdominal tenderness. Musculoskeletal:      Cervical back: Neck supple. Comments: Bilateral below the knee amputations   Lymphadenopathy:      Cervical: No cervical adenopathy. Skin:     General: Skin is warm and dry. Comments: Dressings were not removed from the BKA stumps   Neurological:      Mental Status: He is alert and oriented to person, place, and time.          Laboratory data:   I have independently reviewed the followinglabs:  CBC with Special Requests LT AC,10ML    Culture NO GROWTH 22 HOURS   Culture, Blood 1 [[id number]] Collected: 11/25/21 0939   Order Status: Completed Specimen: Blood Updated: 11/26/21 1137    Specimen Description . BLOOD    Special Requests RT AC,10ML    Culture NO GROWTH 22 HOURS     Medications:      amitriptyline  75 mg Oral Nightly    hydrocortisone   Topical BID    pregabalin  75 mg Oral TID    lactobacillus  1 tablet Oral BID    tamsulosin  0.4 mg Oral Nightly    therapeutic multivitamin-minerals  1 tablet Oral Daily    docusate sodium  100 mg Oral BID    atorvastatin  10 mg Oral Nightly    apixaban  5 mg Oral BID    famotidine  20 mg Oral BID    metFORMIN  500 mg Oral BID WC    ferrous sulfate  325 mg Oral BID    metoprolol tartrate  25 mg Oral BID    naloxegol  25 mg Oral QAM    budesonide-formoterol  2 puff Inhalation BID    aspirin EC  81 mg Oral Daily    insulin glargine  25 Units SubCUTAneous Dinner    insulin lispro  0-18 Units SubCUTAneous TID WC    insulin lispro  0-9 Units SubCUTAneous Nightly    sodium chloride flush  5-40 mL IntraVENous 2 times per day    cefTRIAXone (ROCEPHIN) IV  1,000 mg IntraVENous Q24H    vancomycin (VANCOCIN) intermittent dosing (placeholder)   Other RX Placeholder    vancomycin  750 mg IntraVENous Q12H           [facility]  Rose Acevedo MD  [employer] messaging  OFFICE: [phone]      Electronically signed by Rose Acevedo MD on 11/29/2021 at 12:15 PM  Thank you for allowing us to participate in the care of this patient. Please call with questions. This note iscreated with the assistance of a speech recognition program.  While intending to generate a document that actually reflects the content of the visit, the document can still have some errors including those of syntax andsound a like substitutions which may escape proof reading.   In such instances, actual meaning can be extrapolated by contextual diversion.

## 2021-11-29 NOTE — PLAN OF CARE
Problem: Pain:  Goal: Pain level will decrease  Description: Pain level will decrease  Outcome: Ongoing  Note: Pain level assessment complete.    Patient educated on pain scale and control interventions  PRN pain medication given per patient request  Patient instructed to call out with new onset of pain or unrelieved pain      Problem: Pain:  Goal: Control of acute pain  Description: Control of acute pain  Outcome: Ongoing     Problem: Skin Integrity:  Goal: Will show no infection signs and symptoms  Description: Will show no infection signs and symptoms  Outcome: Ongoing     Problem: Skin Integrity:  Goal: Absence of new skin breakdown  Description: Absence of new skin breakdown  Outcome: Ongoing

## 2021-11-29 NOTE — PROGRESS NOTES
Ammonia lactate applied to right leg as ordered by Dr. Mari Merrill. Patient wiped off Ammonia Lactate patient states this specific cream makes rash worse and itches. And he has another cream ordered.

## 2021-11-29 NOTE — PLAN OF CARE
Problem: Falls - Risk of:  Goal: Will remain free from falls  Description: Will remain free from falls  11/29/2021 1417 by Samuel Laboy RN  Outcome: Ongoing  Note: Bed in lowest position, call light within reach, side rail x 2, hourly rounding, fallen star bed/chair alarm  11/29/2021 0524 by Toni Kwan RN  Outcome: Ongoing  Note: Siderails up x 2  Hourly rounding  Call light in reach  Instructed to call for assist before attempting out of bed.   Remains free from falls and accidental injury at this time   Floor free from obstacles  Bed is locked and in lowest position  Adequate lighting provided  Bed alarm on, Red Falling star and Stay with Me signs posted
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
## 2021-11-29 NOTE — FLOWSHEET NOTE
Patient was on phone.  offered silent prayer at door. Patient did not respond. Chaplains will remain available to offer spiritual and emotional support as needed.        11/29/21 0772   Encounter Summary   Services provided to: Patient not available   Referral/Consult From: Krunal   Continue Visiting   (11/29/21)   Complexity of Encounter Low   Length of Encounter 15 minutes   Routine   Type Follow up   Assessment Unable to respond   Intervention Prayer   Outcome Did not respond

## 2021-11-29 NOTE — PROGRESS NOTES
[address] [facility] Pharmacokinetic Monitoring Service - Vancomycin    Consulting Provider: Cheyenne Hall  Indication: SSTI  Target Concentration: Goal trough of 10-15 mg/L and AUC/DARIAN <500 mg*hr/L  Day of Therapy: 5  Additional Antimicrobials: Rocephin    Pertinent Laboratory Values: Wt Readings from Last 1 Encounters:   11/28/21 157 lb 1.6 oz (71.3 kg)     Temp Readings from Last 1 Encounters:   11/28/21 97.8 °F (36.6 °C) (Oral)     Procalcitonin   Date Value Ref Range Status   05/25/2021 0.13 (H) <0.09 ng/mL Final     Comment:           Suspected Sepsis:  <0.50 ng/mL     Low likelihood of sepsis. 0.50-2.00 ng/mL     Increased likelihood of sepsis. Antibiotics encouraged. >2.00 ng/mL     High risk of sepsis/shock. Antibiotics strongly encouraged. Suspected Lower Resp Tract Infections:  <0.24 ng/mL     Low likelihood of bacterial infection. >0.24 ng/mL     Increased likelihood of bacterial infection. Antibiotics encouraged. With successful antibiotic therapy, PCT levels should decrease rapidly. (Half-life of 24 to   36 hours.)        Procalcitonin values from samples collected within the first 6 hours of systemic infection   may still be low. Retesting may be indicated. Values from day 1 and day 4 can be entered into the Change in Procalcitonin Calculator   (www.bMenus-pct-[email or web address] com) to determine the patient's Mortality Risk Prognosis        In healthy neonates, plasma Procalcitonin (PCT) concentrations increase gradually after   birth, reaching peak values at about 24 hours of age then decrease to normal values below   0.5 ng/mL by 48-72 hours of age. Estimated Creatinine Clearance: 78 mL/min (based on SCr of 0.97 mg/dL). No results for input(s): CREATININE, WBC in the last 72 hours. Invalid input(s):  BUN        MRSA Nasal Swab: N/A. Non-respiratory infection. .    Recent vancomycin administrations                     vancomycin (VANCOCIN) 750 mg in dextrose 5 % 250 mL IVPB (mg) 750 mg New Bag 11/28/21 1711     750 mg New Bag  0302     750 mg New Bag 11/27/21 1548     750 mg New Bag  0317     750 mg New Bag 11/26/21 1548     750 mg New Bag  0229                    Assessment:  Note: Serum concentrations collected for AUC dosing may appear elevated if collected in close proximity to the dose administered, this is not necessarily an indication of toxicity    Plan:  Current dosing regimen is therapeutic  Continue current dose  Pharmacy will continue to monitor patient and adjust therapy as indicated    Thank you for the consult,  Boom Prasad, [address] [facility]  11/28/2021 6:38 PM
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
## 2021-11-30 LAB
ANION GAP SERPL CALCULATED.3IONS-SCNC: 13 MMOL/L (ref 9–17)
BUN BLDV-MCNC: 27 MG/DL (ref 8–23)
BUN/CREAT BLD: 23 (ref 9–20)
CALCIUM SERPL-MCNC: 8.9 MG/DL (ref 8.6–10.4)
CHLORIDE BLD-SCNC: 105 MMOL/L (ref 98–107)
CO2: 19 MMOL/L (ref 20–31)
CREAT SERPL-MCNC: 1.17 MG/DL (ref 0.7–1.2)
CULTURE: NORMAL
CULTURE: NORMAL
GFR AFRICAN AMERICAN: >60 ML/MIN
GFR NON-AFRICAN AMERICAN: >60 ML/MIN
GFR SERPL CREATININE-BSD FRML MDRD: ABNORMAL ML/MIN/{1.73_M2}
GFR SERPL CREATININE-BSD FRML MDRD: ABNORMAL ML/MIN/{1.73_M2}
GLUCOSE BLD-MCNC: 169 MG/DL (ref 75–110)
GLUCOSE BLD-MCNC: 176 MG/DL (ref 75–110)
GLUCOSE BLD-MCNC: 186 MG/DL (ref 70–99)
GLUCOSE BLD-MCNC: 241 MG/DL (ref 75–110)
GLUCOSE BLD-MCNC: 302 MG/DL (ref 75–110)
Lab: NORMAL
Lab: NORMAL
POTASSIUM SERPL-SCNC: 4.5 MMOL/L (ref 3.7–5.3)
SODIUM BLD-SCNC: 137 MMOL/L (ref 135–144)
SPECIMEN DESCRIPTION: NORMAL
SPECIMEN DESCRIPTION: NORMAL
VANCOMYCIN TROUGH DATE LAST DOSE: ABNORMAL
VANCOMYCIN TROUGH DOSE AMOUNT: ABNORMAL
VANCOMYCIN TROUGH TIME LAST DOSE: ABNORMAL
VANCOMYCIN TROUGH: 28.9 UG/ML (ref 10–20)

## 2021-11-30 PROCEDURE — 94640 AIRWAY INHALATION TREATMENT: CPT

## 2021-11-30 PROCEDURE — 6370000000 HC RX 637 (ALT 250 FOR IP): Performed by: INTERNAL MEDICINE

## 2021-11-30 PROCEDURE — 36415 COLL VENOUS BLD VENIPUNCTURE: CPT

## 2021-11-30 PROCEDURE — 6360000002 HC RX W HCPCS: Performed by: INTERNAL MEDICINE

## 2021-11-30 PROCEDURE — 2580000003 HC RX 258: Performed by: INTERNAL MEDICINE

## 2021-11-30 PROCEDURE — 97110 THERAPEUTIC EXERCISES: CPT | Performed by: NURSE PRACTITIONER

## 2021-11-30 PROCEDURE — 1200000000 HC SEMI PRIVATE

## 2021-11-30 PROCEDURE — 6370000000 HC RX 637 (ALT 250 FOR IP): Performed by: NURSE PRACTITIONER

## 2021-11-30 PROCEDURE — 99232 SBSQ HOSP IP/OBS MODERATE 35: CPT | Performed by: INTERNAL MEDICINE

## 2021-11-30 PROCEDURE — 80048 BASIC METABOLIC PNL TOTAL CA: CPT

## 2021-11-30 PROCEDURE — 80202 ASSAY OF VANCOMYCIN: CPT

## 2021-11-30 PROCEDURE — 82947 ASSAY GLUCOSE BLOOD QUANT: CPT

## 2021-11-30 RX ADMIN — MORPHINE SULFATE 4 MG: 4 INJECTION INTRAVENOUS at 08:34

## 2021-11-30 RX ADMIN — VANCOMYCIN HYDROCHLORIDE 750 MG: 750 INJECTION, POWDER, LYOPHILIZED, FOR SOLUTION INTRAVENOUS at 17:51

## 2021-11-30 RX ADMIN — FERROUS SULFATE TAB EC 325 MG (65 MG FE EQUIVALENT) 325 MG: 325 (65 FE) TABLET DELAYED RESPONSE at 21:24

## 2021-11-30 RX ADMIN — APIXABAN 5 MG: 5 TABLET, FILM COATED ORAL at 21:25

## 2021-11-30 RX ADMIN — DOCUSATE SODIUM 100 MG: 100 CAPSULE, LIQUID FILLED ORAL at 21:25

## 2021-11-30 RX ADMIN — INSULIN LISPRO 3 UNITS: 100 INJECTION, SOLUTION INTRAVENOUS; SUBCUTANEOUS at 08:27

## 2021-11-30 RX ADMIN — FAMOTIDINE 20 MG: 20 TABLET ORAL at 21:25

## 2021-11-30 RX ADMIN — TAMSULOSIN HYDROCHLORIDE 0.4 MG: 0.4 CAPSULE ORAL at 21:24

## 2021-11-30 RX ADMIN — ACETAMINOPHEN 500 MG: 500 TABLET ORAL at 17:41

## 2021-11-30 RX ADMIN — MORPHINE SULFATE 4 MG: 4 INJECTION INTRAVENOUS at 13:13

## 2021-11-30 RX ADMIN — Medication 10 ML: at 17:51

## 2021-11-30 RX ADMIN — BUDESONIDE AND FORMOTEROL FUMARATE DIHYDRATE 2 PUFF: 160; 4.5 AEROSOL RESPIRATORY (INHALATION) at 10:10

## 2021-11-30 RX ADMIN — BUDESONIDE AND FORMOTEROL FUMARATE DIHYDRATE 2 PUFF: 160; 4.5 AEROSOL RESPIRATORY (INHALATION) at 21:38

## 2021-11-30 RX ADMIN — MORPHINE SULFATE 4 MG: 4 INJECTION INTRAVENOUS at 03:39

## 2021-11-30 RX ADMIN — METOPROLOL TARTRATE 25 MG: 25 TABLET, FILM COATED ORAL at 08:26

## 2021-11-30 RX ADMIN — CEFTRIAXONE SODIUM 1000 MG: 1 INJECTION, POWDER, FOR SOLUTION INTRAMUSCULAR; INTRAVENOUS at 13:13

## 2021-11-30 RX ADMIN — Medication 10 ML: at 06:44

## 2021-11-30 RX ADMIN — SODIUM CHLORIDE, PRESERVATIVE FREE 10 ML: 5 INJECTION INTRAVENOUS at 21:32

## 2021-11-30 RX ADMIN — PREGABALIN 75 MG: 75 CAPSULE ORAL at 13:14

## 2021-11-30 RX ADMIN — NALOXEGOL OXALATE 25 MG: 25 TABLET, FILM COATED ORAL at 08:27

## 2021-11-30 RX ADMIN — FAMOTIDINE 20 MG: 20 TABLET ORAL at 08:27

## 2021-11-30 RX ADMIN — INSULIN LISPRO 3 UNITS: 100 INJECTION, SOLUTION INTRAVENOUS; SUBCUTANEOUS at 13:13

## 2021-11-30 RX ADMIN — INSULIN LISPRO 6 UNITS: 100 INJECTION, SOLUTION INTRAVENOUS; SUBCUTANEOUS at 17:41

## 2021-11-30 RX ADMIN — ACETAMINOPHEN 500 MG: 500 TABLET ORAL at 08:27

## 2021-11-30 RX ADMIN — APIXABAN 5 MG: 5 TABLET, FILM COATED ORAL at 08:26

## 2021-11-30 RX ADMIN — PROBIOTIC PRODUCT - TAB 1 TABLET: TAB at 08:26

## 2021-11-30 RX ADMIN — INSULIN GLARGINE 25 UNITS: 100 INJECTION, SOLUTION SUBCUTANEOUS at 17:41

## 2021-11-30 RX ADMIN — INSULIN LISPRO 6 UNITS: 100 INJECTION, SOLUTION INTRAVENOUS; SUBCUTANEOUS at 21:25

## 2021-11-30 RX ADMIN — ATORVASTATIN CALCIUM 10 MG: 10 TABLET, FILM COATED ORAL at 21:24

## 2021-11-30 RX ADMIN — AMITRIPTYLINE HYDROCHLORIDE 75 MG: 25 TABLET, FILM COATED ORAL at 21:24

## 2021-11-30 RX ADMIN — Medication 3 MG: at 21:45

## 2021-11-30 RX ADMIN — ASPIRIN 81 MG: 81 TABLET, COATED ORAL at 08:27

## 2021-11-30 RX ADMIN — Medication 10 ML: at 23:17

## 2021-11-30 RX ADMIN — PREGABALIN 75 MG: 75 CAPSULE ORAL at 08:27

## 2021-11-30 RX ADMIN — PROBIOTIC PRODUCT - TAB 1 TABLET: TAB at 21:24

## 2021-11-30 RX ADMIN — VANCOMYCIN HYDROCHLORIDE 750 MG: 750 INJECTION, POWDER, LYOPHILIZED, FOR SOLUTION INTRAVENOUS at 03:39

## 2021-11-30 RX ADMIN — METFORMIN HYDROCHLORIDE 500 MG: 500 TABLET ORAL at 17:41

## 2021-11-30 RX ADMIN — METFORMIN HYDROCHLORIDE 500 MG: 500 TABLET ORAL at 08:26

## 2021-11-30 RX ADMIN — HYDROCORTISONE: 0.01 CREAM TOPICAL at 21:37

## 2021-11-30 RX ADMIN — FERROUS SULFATE TAB EC 325 MG (65 MG FE EQUIVALENT) 325 MG: 325 (65 FE) TABLET DELAYED RESPONSE at 08:27

## 2021-11-30 RX ADMIN — MULTIPLE VITAMINS W/ MINERALS TAB 1 TABLET: TAB at 08:26

## 2021-11-30 RX ADMIN — MORPHINE SULFATE 4 MG: 4 INJECTION INTRAVENOUS at 21:22

## 2021-11-30 RX ADMIN — METOPROLOL TARTRATE 25 MG: 25 TABLET, FILM COATED ORAL at 21:24

## 2021-11-30 RX ADMIN — Medication 10 ML: at 10:45

## 2021-11-30 RX ADMIN — PREGABALIN 75 MG: 75 CAPSULE ORAL at 21:24

## 2021-11-30 RX ADMIN — DOCUSATE SODIUM 100 MG: 100 CAPSULE, LIQUID FILLED ORAL at 08:27

## 2021-11-30 ASSESSMENT — PAIN DESCRIPTION - ONSET: ONSET: ON-GOING

## 2021-11-30 ASSESSMENT — PAIN DESCRIPTION - LOCATION: LOCATION: LEG

## 2021-11-30 ASSESSMENT — PAIN DESCRIPTION - FREQUENCY: FREQUENCY: CONTINUOUS

## 2021-11-30 ASSESSMENT — PAIN SCALES - GENERAL
PAINLEVEL_OUTOF10: 8

## 2021-11-30 ASSESSMENT — PAIN DESCRIPTION - PROGRESSION: CLINICAL_PROGRESSION: NOT CHANGED

## 2021-11-30 ASSESSMENT — PAIN DESCRIPTION - ORIENTATION: ORIENTATION: RIGHT;LEFT

## 2021-11-30 ASSESSMENT — PAIN - FUNCTIONAL ASSESSMENT: PAIN_FUNCTIONAL_ASSESSMENT: PREVENTS OR INTERFERES SOME ACTIVE ACTIVITIES AND ADLS

## 2021-11-30 ASSESSMENT — PAIN DESCRIPTION - DESCRIPTORS: DESCRIPTORS: ACHING;DISCOMFORT

## 2021-11-30 ASSESSMENT — PAIN DESCRIPTION - PAIN TYPE: TYPE: ACUTE PAIN;PHANTOM PAIN
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
## 2021-11-30 NOTE — PROGRESS NOTES
DATE: 2021    NAME: Phoenix Wheeler  MRN: [id number]   : 1955    Patient not seen this date for Physical Therapy due to:      [] Cancel by RN or physician due to:    [] Hemodialysis    [] Critical Lab Value Level     [] Blood transfusion in progress    [] Acute or unstable cardiovascular status   _MAP < 55 or more than >115  _HR < 40 or > 130    [] Acute or unstable pulmonary status   -FiO2 > 60%   _RR < 5 or >40    _O2 sats < 85%    [] Strict Bedrest    [] Off Unit for surgery or procedure    [] Off Unit for testing       [] Pending imaging to R/O fracture    [] Refusal by Patient      [x] Other pt on personal phone call taking care of personal business will check back later if time allows       [] PT being discontinued at this time. Patient independent. No further needs. [] PT being discontinued at this time as the patient has been transferred to hospice care. No further needs.       RADHA CABALLERO, PTA
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
## 2021-11-30 NOTE — PLAN OF CARE
Problem: Falls - Risk of:  Goal: Will remain free from falls  Description: Will remain free from falls  11/30/2021 0011 by Varun Vasquez RN  Outcome: Ongoing  Note: Patient will be free from falls this shift and is aware of personal limits. 11/29/2021 1417 by Nina Rowe RN  Outcome: Ongoing  Note: Bed in lowest position, call light within reach, side rail x 2, hourly rounding, fallen star bed/chair alarm  Goal: Absence of physical injury  Description: Absence of physical injury  Outcome: Ongoing  Note: Patient is free from injury this shift. Problem: Pain:  Goal: Pain level will decrease  Description: Pain level will decrease  Outcome: Ongoing  Note: Patient has as needed pain control and will ask for it.

## 2021-11-30 NOTE — PROGRESS NOTES
[facility]  Office: [address], DO, Annikaradha Jean-Baptiste, DO, Jenice Duane, DO, Nagi Aguilera [facility], DO, Trent Aguirre MD, Esdras Foster MD, Demetra Michele MD, Hoa Nathan MD, Deanna Vera MD, Shamar Albert MD, Jj Norton MD, Nafisa Toth, DO, Rony Thakkar, DO, Abbey Richardson MD,  Ramonita Vega, DO, Alphonso Romero MD, Trev Baker MD, Keesha Wynn MD, Bladimir Carvajal MD, Sharonda Gunderson MD, Billy Botello MD, Lamin Combs MD, Dyana Escalante [facility], [facility], CNP, Leo Graves, CNP, Tanda Lesches, CNS, Eric Wheeler, CNP, Oumou Acosta, CNP, Mckenzie Cloud, CNP, June Rutledge, CNP, Delbert Aase, CNP, Oliver Jenkins PA-C, Nancy Price, [facility], Ernie Rubi, CNP, Maggie Fortune, CNP, Jas Pimentel, CNP, Delmar Negron, CNP, Mady Haile, [facility], [facility], [facility], Tanisha Singleton, [facility]    Progress Note    11/30/2021    11:07 AM    Name:   Wanda August  MRN:     [id number]     Acct:      [de-identified]   Room:   2024/2024-01   Day:  4  Admit Date:  11/25/2021  9:10 AM    PCP:   MARCELO Vital CNP  Code Status:  Full Code    Subjective:     C/C:   Chief Complaint   Patient presents with    Other     abnormal labs     Interval History Status: improved. Pt doing well. No complaints. No chest pain, SOB, difficulty breathing, nausea, vomiting, diarrhea.      Brief History:       Kirti Haas a 24-year-old  gentleman who presented to [facility] on 11/25/2021 for evaluation of hyperkalemia.  Patient recently had a prolonged hospitalization and was recently discharged on 11/13/2021 after infection of his left BKA stump.  He was discharged at that time on ceftaroline.  Please been residing at a skilled nursing facility since then. [facility] studies were drawn in the outpatient setting he was noted to be hyperkalemic.  He was given IBETH L[facility], but repeat labs revealed persistent hyperkalemia.  He was admitted to the hospital for further evaluation. Idania Marinelli was given insulin, dextrose and calcium in the emergency department.  Potassium is improved.  Infectious diseases been consulted. Becky Lopez will be admitted for further work-up and treatment of hyperkalemia.     -Was discharged.  -Acute rehab refusing him, where he previously was at  -We are currently working on logistics of placement. Review of Systems:     Constitutional:  negative for chills, fevers, sweats  Respiratory:  negative for cough, dyspnea on exertion, shortness of breath, wheezing  Cardiovascular:  negative for chest pain, chest pressure/discomfort, lower extremity edema, palpitations  Gastrointestinal:  negative for abdominal pain, constipation, diarrhea, nausea, vomiting  Neurological:  negative for dizziness, headache    Medications: Allergies:     Allergies   Allergen Reactions    Gabapentin Other (See Comments)     dizziness    Other        Current Meds:   Scheduled Meds:    acetaminophen  500 mg Oral Q8H    hydrocortisone-aloe   Topical BID    amitriptyline  75 mg Oral Nightly    pregabalin  75 mg Oral TID    lactobacillus  1 tablet Oral BID    tamsulosin  0.4 mg Oral Nightly    therapeutic multivitamin-minerals  1 tablet Oral Daily    docusate sodium  100 mg Oral BID    atorvastatin  10 mg Oral Nightly    apixaban  5 mg Oral BID    famotidine  20 mg Oral BID    metFORMIN  500 mg Oral BID WC    ferrous sulfate  325 mg Oral BID    metoprolol tartrate  25 mg Oral BID    naloxegol  25 mg Oral QAM    budesonide-formoterol  2 puff Inhalation BID    aspirin EC  81 mg Oral Daily    insulin glargine  25 Units SubCUTAneous Dinner    insulin lispro  0-18 Units SubCUTAneous TID WC    insulin lispro  0-9 Units SubCUTAneous Nightly    sodium chloride flush  5-40 mL IntraVENous 2 times per day    cefTRIAXone (ROCEPHIN) IV  1,000 mg IntraVENous Q24H    vancomycin (VANCOCIN) intermittent dosing Sister     Alcohol Abuse Maternal Aunt     Alcohol Abuse Maternal Uncle     Alcohol Abuse Paternal Aunt        Vitals:  BP (!) 130/59   Pulse 76   Temp 97.9 °F (36.6 °C) (Oral)   Resp 16   Ht 6' 1\" (1.854 m)   Wt 154 lb (69.9 kg)   SpO2 96%   BMI 20.32 kg/m²   Temp (24hrs), Av °F (36.7 °C), Min:97.9 °F (36.6 °C), Max:98.1 °F (36.7 °C)    Recent Labs     21  1112 21  1616 21  0632   POCGLU 203* 227* 166* 176*       I/O (24Hr): Intake/Output Summary (Last 24 hours) at 2021 1107  Last data filed at 2021 0728  Gross per 24 hour   Intake --   Output 400 ml   Net -400 ml       Labs:  Hematology:No results for input(s): WBC, RBC, HGB, HCT, MCV, MCH, MCHC, RDW, PLT, MPV, SEDRATE, CRP, INR, DDIMER, UR7HUIJA, LABABSO in the last 72 hours. Invalid input(s): PT  Chemistry:  Recent Labs     21  0641      K 4.5      CO2 19*   GLUCOSE 186*   BUN 27*   CREATININE 1.17   ANIONGAP 13   LABGLOM >60   GFRAA >60   CALCIUM 8.9     Recent Labs     21  2050 21  0607 21  1112 21  1616 21  0632   POCGLU 255* 133* 203* 227* 166* 176*     ABG:  Lab Results   Component Value Date    FIO2 INFORMATION NOT PROVIDED 2021     Lab Results   Component Value Date/Time    SPECIAL NOT REPORTED 2021 11:34 AM     Lab Results   Component Value Date/Time    CULTURE NO GROWTH 2021 11:34 AM       Radiology:  XR CHEST 1 VIEW    Result Date: 2021  Linear opacities in the right lung base compatible with subsegmental atelectasis. No consolidation identified.        Physical Examination:        General appearance:  alert, cooperative and no distress  Mental Status:  oriented to person, place and time and normal affect  Lungs:  clear to auscultation bilaterally, normal effort  Heart:  regular rate and rhythm, no murmur  Abdomen:  soft, nontender, nondistended, normal bowel sounds, no masses, hepatomegaly, splenomegaly  Extremities:  no edema, redness, tenderness in the calves  Skin:  no gross lesions, rashes, induration    Assessment:        Hospital Problems           Last Modified POA    * (Principal) Hyperkalemia 11/27/2021 Yes    Type 2 diabetes mellitus with diabetic polyneuropathy, with long-term current use of insulin ([facility] [address] 11/26/2021 Yes    Diabetic polyneuropathy ([facility] [address] 11/26/2021 Yes    COPD without exacerbation ([facility] [address] 11/26/2021 Yes    S/P BKA (below knee amputation) bilateral ([facility] [address] 11/26/2021 Yes    Essential hypertension 11/26/2021 Yes    Cellulitis of left lower extremity 11/26/2021 Yes          Plan:        1. Awaiting disposition on discharge to Critical access hospital versus home and transfer to [place]. 2. Continue vancomycin and Rocephin through 12/8  3. Continue current dose of Lortab  4. Continue continue local wound care  5. Continue Lyrica for phantom pains  6. Continue insulin sliding scale and Lantus and Metformin for type 2 diabetes mellitus  7. Change Lac-Hydrin to hydrocortisone 1% cream.  Give Benadryl and Tylenol.   8. Remains medically stable for discharge.     Dispo: pt has been discharged, waiting on placement    Araceli Leger DO  11/30/2021  11:07 AM

## 2021-11-30 NOTE — PLAN OF CARE
Problem: Falls - Risk of:  Goal: Will remain free from falls  Description: Will remain free from falls  Outcome: Ongoing  Goal: Absence of physical injury  Description: Absence of physical injury  Outcome: Ongoing     Problem: Skin Integrity:  Goal: Will show no infection signs and symptoms  Description: Will show no infection signs and symptoms  Outcome: Ongoing  Goal: Absence of new skin breakdown  Description: Absence of new skin breakdown  Outcome: Ongoing     Problem: Physical Regulation:  Goal: Will remain free from infection  Description: Will remain free from infection  Outcome: Ongoing     Problem: Respiratory:  Goal: Ability to maintain normal respiratory secretions will improve  Description: Ability to maintain normal respiratory secretions will improve  Outcome: Ongoing     Problem: Metabolic:  Goal: Ability to maintain appropriate glucose levels will improve  Description: Ability to maintain appropriate glucose levels will improve  Outcome: Ongoing     Problem: Pain:  Goal: Pain level will decrease  Description: Pain level will decrease  Outcome: Ongoing  Goal: Control of acute pain  Description: Control of acute pain  Outcome: Ongoing  Goal: Control of chronic pain  Description: Control of chronic pain  Outcome: Ongoing

## 2021-12-01 LAB
GLUCOSE BLD-MCNC: 188 MG/DL (ref 75–110)
GLUCOSE BLD-MCNC: 228 MG/DL (ref 75–110)
GLUCOSE BLD-MCNC: 249 MG/DL (ref 75–110)
GLUCOSE BLD-MCNC: 329 MG/DL (ref 75–110)

## 2021-12-01 PROCEDURE — 2580000003 HC RX 258: Performed by: INTERNAL MEDICINE

## 2021-12-01 PROCEDURE — 6370000000 HC RX 637 (ALT 250 FOR IP): Performed by: INTERNAL MEDICINE

## 2021-12-01 PROCEDURE — 99232 SBSQ HOSP IP/OBS MODERATE 35: CPT | Performed by: INTERNAL MEDICINE

## 2021-12-01 PROCEDURE — 99232 SBSQ HOSP IP/OBS MODERATE 35: CPT | Performed by: NURSE PRACTITIONER

## 2021-12-01 PROCEDURE — 97535 SELF CARE MNGMENT TRAINING: CPT

## 2021-12-01 PROCEDURE — 94640 AIRWAY INHALATION TREATMENT: CPT

## 2021-12-01 PROCEDURE — 1200000000 HC SEMI PRIVATE

## 2021-12-01 PROCEDURE — 82947 ASSAY GLUCOSE BLOOD QUANT: CPT

## 2021-12-01 PROCEDURE — 6360000002 HC RX W HCPCS: Performed by: INTERNAL MEDICINE

## 2021-12-01 PROCEDURE — 6370000000 HC RX 637 (ALT 250 FOR IP): Performed by: NURSE PRACTITIONER

## 2021-12-01 PROCEDURE — 97110 THERAPEUTIC EXERCISES: CPT

## 2021-12-01 RX ORDER — ALOGLIPTIN 25 MG/1
25 TABLET, FILM COATED ORAL DAILY
Status: DISCONTINUED | OUTPATIENT
Start: 2021-12-01 | End: 2021-12-03 | Stop reason: HOSPADM

## 2021-12-01 RX ORDER — TROLAMINE SALICYLATE 10 G/100G
CREAM TOPICAL 2 TIMES DAILY PRN
Status: DISCONTINUED | OUTPATIENT
Start: 2021-12-01 | End: 2021-12-03 | Stop reason: HOSPADM

## 2021-12-01 RX ADMIN — AMITRIPTYLINE HYDROCHLORIDE 75 MG: 25 TABLET, FILM COATED ORAL at 21:10

## 2021-12-01 RX ADMIN — PREGABALIN 75 MG: 75 CAPSULE ORAL at 08:51

## 2021-12-01 RX ADMIN — METFORMIN HYDROCHLORIDE 500 MG: 500 TABLET ORAL at 17:30

## 2021-12-01 RX ADMIN — MORPHINE SULFATE 4 MG: 4 INJECTION INTRAVENOUS at 21:23

## 2021-12-01 RX ADMIN — APIXABAN 5 MG: 5 TABLET, FILM COATED ORAL at 21:11

## 2021-12-01 RX ADMIN — PREGABALIN 75 MG: 75 CAPSULE ORAL at 12:34

## 2021-12-01 RX ADMIN — METOPROLOL TARTRATE 25 MG: 25 TABLET, FILM COATED ORAL at 21:11

## 2021-12-01 RX ADMIN — ALOGLIPTIN 25 MG: 25 TABLET, FILM COATED ORAL at 19:01

## 2021-12-01 RX ADMIN — DOCUSATE SODIUM 100 MG: 100 CAPSULE, LIQUID FILLED ORAL at 08:51

## 2021-12-01 RX ADMIN — INSULIN LISPRO 6 UNITS: 100 INJECTION, SOLUTION INTRAVENOUS; SUBCUTANEOUS at 21:11

## 2021-12-01 RX ADMIN — CEFTRIAXONE SODIUM 1000 MG: 1 INJECTION, POWDER, FOR SOLUTION INTRAMUSCULAR; INTRAVENOUS at 12:34

## 2021-12-01 RX ADMIN — METFORMIN HYDROCHLORIDE 500 MG: 500 TABLET ORAL at 08:51

## 2021-12-01 RX ADMIN — Medication 10 ML: at 17:30

## 2021-12-01 RX ADMIN — METOPROLOL TARTRATE 25 MG: 25 TABLET, FILM COATED ORAL at 08:51

## 2021-12-01 RX ADMIN — INSULIN GLARGINE 25 UNITS: 100 INJECTION, SOLUTION SUBCUTANEOUS at 17:30

## 2021-12-01 RX ADMIN — FAMOTIDINE 20 MG: 20 TABLET ORAL at 08:51

## 2021-12-01 RX ADMIN — FERROUS SULFATE TAB EC 325 MG (65 MG FE EQUIVALENT) 325 MG: 325 (65 FE) TABLET DELAYED RESPONSE at 08:51

## 2021-12-01 RX ADMIN — PROBIOTIC PRODUCT - TAB 1 TABLET: TAB at 08:51

## 2021-12-01 RX ADMIN — MORPHINE SULFATE 4 MG: 4 INJECTION INTRAVENOUS at 19:01

## 2021-12-01 RX ADMIN — FERROUS SULFATE TAB EC 325 MG (65 MG FE EQUIVALENT) 325 MG: 325 (65 FE) TABLET DELAYED RESPONSE at 21:11

## 2021-12-01 RX ADMIN — HYDROCORTISONE: 0.01 CREAM TOPICAL at 21:11

## 2021-12-01 RX ADMIN — PREGABALIN 75 MG: 75 CAPSULE ORAL at 21:11

## 2021-12-01 RX ADMIN — FAMOTIDINE 20 MG: 20 TABLET ORAL at 21:11

## 2021-12-01 RX ADMIN — SODIUM CHLORIDE, PRESERVATIVE FREE 10 ML: 5 INJECTION INTRAVENOUS at 21:20

## 2021-12-01 RX ADMIN — MULTIPLE VITAMINS W/ MINERALS TAB 1 TABLET: TAB at 08:51

## 2021-12-01 RX ADMIN — ACETAMINOPHEN 500 MG: 500 TABLET ORAL at 08:51

## 2021-12-01 RX ADMIN — TAMSULOSIN HYDROCHLORIDE 0.4 MG: 0.4 CAPSULE ORAL at 21:11

## 2021-12-01 RX ADMIN — PROBIOTIC PRODUCT - TAB 1 TABLET: TAB at 21:10

## 2021-12-01 RX ADMIN — DOCUSATE SODIUM 100 MG: 100 CAPSULE, LIQUID FILLED ORAL at 21:10

## 2021-12-01 RX ADMIN — Medication 3 MG: at 21:10

## 2021-12-01 RX ADMIN — ASPIRIN 81 MG: 81 TABLET, COATED ORAL at 08:51

## 2021-12-01 RX ADMIN — INSULIN LISPRO 6 UNITS: 100 INJECTION, SOLUTION INTRAVENOUS; SUBCUTANEOUS at 08:50

## 2021-12-01 RX ADMIN — ATORVASTATIN CALCIUM 10 MG: 10 TABLET, FILM COATED ORAL at 21:11

## 2021-12-01 RX ADMIN — BUDESONIDE AND FORMOTEROL FUMARATE DIHYDRATE 2 PUFF: 160; 4.5 AEROSOL RESPIRATORY (INHALATION) at 21:07

## 2021-12-01 RX ADMIN — INSULIN LISPRO 6 UNITS: 100 INJECTION, SOLUTION INTRAVENOUS; SUBCUTANEOUS at 12:34

## 2021-12-01 RX ADMIN — APIXABAN 5 MG: 5 TABLET, FILM COATED ORAL at 08:51

## 2021-12-01 RX ADMIN — VANCOMYCIN HYDROCHLORIDE 750 MG: 750 INJECTION, POWDER, LYOPHILIZED, FOR SOLUTION INTRAVENOUS at 03:29

## 2021-12-01 RX ADMIN — Medication 10 ML: at 23:23

## 2021-12-01 RX ADMIN — NALOXEGOL OXALATE 25 MG: 25 TABLET, FILM COATED ORAL at 08:51

## 2021-12-01 RX ADMIN — INSULIN LISPRO 3 UNITS: 100 INJECTION, SOLUTION INTRAVENOUS; SUBCUTANEOUS at 17:29

## 2021-12-01 RX ADMIN — ACETAMINOPHEN 500 MG: 500 TABLET ORAL at 01:42

## 2021-12-01 RX ADMIN — MORPHINE SULFATE 4 MG: 4 INJECTION INTRAVENOUS at 12:34

## 2021-12-01 RX ADMIN — Medication 10 ML: at 08:50

## 2021-12-01 RX ADMIN — TROLAMINE SALICYLATE: 100 CREAM TOPICAL at 23:23

## 2021-12-01 ASSESSMENT — PAIN DESCRIPTION - FREQUENCY
FREQUENCY: CONTINUOUS
FREQUENCY: CONTINUOUS

## 2021-12-01 ASSESSMENT — PAIN DESCRIPTION - PAIN TYPE
TYPE: ACUTE PAIN;PHANTOM PAIN
TYPE: ACUTE PAIN;PHANTOM PAIN

## 2021-12-01 ASSESSMENT — PAIN DESCRIPTION - ORIENTATION
ORIENTATION: LEFT
ORIENTATION: LEFT

## 2021-12-01 ASSESSMENT — PAIN SCALES - GENERAL
PAINLEVEL_OUTOF10: 8

## 2021-12-01 ASSESSMENT — PAIN DESCRIPTION - DESCRIPTORS
DESCRIPTORS: ACHING;DISCOMFORT
DESCRIPTORS: ACHING;DISCOMFORT

## 2021-12-01 ASSESSMENT — PAIN DESCRIPTION - PROGRESSION
CLINICAL_PROGRESSION: NOT CHANGED
CLINICAL_PROGRESSION: NOT CHANGED

## 2021-12-01 ASSESSMENT — ENCOUNTER SYMPTOMS
SHORTNESS OF BREATH: 0
NAUSEA: 0
COUGH: 0
ABDOMINAL PAIN: 0
VOMITING: 0
RESPIRATORY NEGATIVE: 1
GASTROINTESTINAL NEGATIVE: 1

## 2021-12-01 ASSESSMENT — PAIN DESCRIPTION - ONSET
ONSET: ON-GOING
ONSET: ON-GOING

## 2021-12-01 ASSESSMENT — PAIN DESCRIPTION - LOCATION
LOCATION: LEG
LOCATION: LEG

## 2021-12-01 NOTE — PLAN OF CARE
acute pain  Description: Control of acute pain  12/1/2021 0419 by Scott Colin RN  Outcome: Ongoing     Problem: Pain:  Goal: Control of chronic pain  Description: Control of chronic pain  12/1/2021 0419 by Scott Colin RN  Outcome: Ongoing

## 2021-12-01 NOTE — CARE COORDINATION
Social Shailesh Rinaldi is out of network. They are working on a one tie contract Sent referral to [employer].  Haylee Robetrs

## 2021-12-01 NOTE — PROGRESS NOTES
[facility]  Progress Note    Kailey Fuentes  MRN: [id number]  Date: 12/1/2021  LOS: 5     Reason for F/U :   Adverse drug reaction    Impression :   1. Known left BKA stump traumatic wound with infected hematoma and cultures grew out MRSA/MSSA and Klebsiella aerogenes  2. Hyperkalemia concern for adverse effect secondary to ceftaroline  3. Diarrhea likely antibiotic induced but will need to rule out C. difficile infection  4. Diabetes mellitus with associated neuropathy  5. Peripheral arterial disease  6. COPD    Recommendations:   · Patient continues on IV antimicrobial therapy with ceftriaxone and vancomycin through 12/8/2021  · The hyperkalemia has resolved  · Continue local wound care  · The patient has been denied back to the acute care facility, patient being placed is an issue at this time    Infection Control Recommendations:   Contact precautions    Discharge Planning:   Estimated Length of IV antimicrobials: 12/8/2021  Patient has a PICC line in place  Patient will need: Home IV , [place],  SNF,  LTAC: Undetermined  Patient willneed outpatient wound care: No    Medical Decision making / Summary of Stay:   Colonel Sunil Cardenas is a 61y.o.-year-old male who was initially admitted on 11/25/2021.  Mo is well-known to me and has a history of peripheral arterial disease, COPD, diabetes mellitus with associated neuropathy, history of esophageal cancer among other medical problems. He was recently hospitalized here for a left BKA stump infected hematoma with osteomyelitis of the residual tibia. The patient did have MRSA/MSSA and Klebsiella Aerogenes and [facility] which he is scheduled to continue through 12/8/2021.   The patient has been having diarrhea as well as lab testing showing increasing potassium variations that have been refractory to [address] concern was that the ceftaroline could potentially be causing the hyperkalemia.     The patient reports that every time he eats he has diarrhea. He reports that the stools are watery. He will not really quantify how many times he is going to the bathroom. Current evaluation:2021    BP (!) 126/57   Pulse 75   Temp 97.9 °F (36.6 °C) (Oral)   Resp 16   Ht 6' 1\" (1.854 m)   Wt 154 lb 8 oz (70.1 kg)   SpO2 96%   BMI 20.38 kg/m²     Temperature Range: Temp: 97.9 °F (36.6 °C) Temp  Av.2 °F (36.8 °C)  Min: 97.9 °F (36.6 °C)  Max: 98.4 °F (36.9 °C)    Patient was seen and evaluated sitting up on the bedside commode denies any pain or needs at this time  Still awaiting placement    Review of Systems   Constitutional: Negative. HENT: Negative. Respiratory: Negative. Cardiovascular: Negative. Gastrointestinal: Negative. Genitourinary: Negative. Musculoskeletal: Negative. Skin: Negative. Neurological: Negative. Psychiatric/Behavioral: Negative. Physical Examination :     Physical Exam  Constitutional:       Appearance: Normal appearance. He is normal weight. HENT:      Head: Normocephalic and atraumatic. Pulmonary:      Effort: Pulmonary effort is normal. No respiratory distress. Musculoskeletal:      Comments: Bilateral below the knee amputations   Skin:     Comments: Bilateral BKA stumps, dressings not removed   Neurological:      General: No focal deficit present. Mental Status: He is alert. Mental status is at baseline. Psychiatric:         Mood and Affect: Mood normal.         Behavior: Behavior normal.         Thought Content: Thought content normal.         Laboratory data:   I have independently reviewed the followinglabs:  CBC with Differential: No results for input(s): WBC, HGB, HCT, PLT, SEGSPCT, BANDSPCT, LYMPHOPCT, MONOPCT, EOSPCT in the last 72 hours.   BMP:   Recent Labs     21  0641      K 4.5      CO2 19*   BUN 27*   CREATININE 1.17     Hepatic Function Panel: No results for input(s): PROT, LABALBU, BILIDIR, IBILI, BILITOT, ALKPHOS, ALT, AST in the last 72 hours. Lab Results   Component Value Date    PROCAL 0.13 05/25/2021    PROCAL 0.11 05/25/2021     Lab Results   Component Value Date    .7 11/04/2021    CRP 91.6 04/05/2021    .0 02/03/2021     Lab Results   Component Value Date    SEDRATE 97 (H) 11/04/2021         Lab Results   Component Value Date    DDIMER 0.39 06/29/2020    DDIMER 1.63 12/20/2017    DDIMER 0.68 12/25/2011     Lab Results   Component Value Date    FERRITIN 56 02/09/2021    FERRITIN 64 01/25/2014     No results found for: LDH  No results found for: FIBRINOGEN    Results in Past 30 Days  Result Component Current Result Ref Range Previous Result Ref Range   SARS-CoV-2, Rapid Not Detected (11/3/2021) Not Detected Not in Time Range      Lab Results   Component Value Date    COVID19 Not Detected 11/03/2021    COVID19 Not Detected 09/02/2021    COVID19 Not Detected 08/29/2021    COVID19 Not Detected 08/23/2020       Recent Labs     11/30/21  0641   [facility] 28.9*       Imaging Studies:   No new imaging    Cultures:     Culture, Blood 1 [[id number]] Collected: 11/25/21 0938   Order Status: Completed Specimen: Blood Updated: 11/30/21 1326    Specimen Description . BLOOD    Special Requests LT AC,10ML    Culture NO GROWTH 5 DAYS   Culture, Blood 1 [[id number]] Collected: 11/25/21 0939   Order Status: Completed Specimen: Blood Updated: 11/30/21 1323    Specimen Description . BLOOD    Special Requests RT AC,10ML    Culture NO GROWTH 5 DAYS         Medications:      acetaminophen  500 mg Oral Q8H    hydrocortisone-aloe   Topical BID    amitriptyline  75 mg Oral Nightly    pregabalin  75 mg Oral TID    lactobacillus  1 tablet Oral BID    tamsulosin  0.4 mg Oral Nightly    therapeutic multivitamin-minerals  1 tablet Oral Daily    docusate sodium  100 mg Oral BID    atorvastatin  10 mg Oral Nightly    apixaban  5 mg Oral BID    famotidine  20 mg Oral BID    metFORMIN  500 mg Oral BID WC    ferrous sulfate  325 mg Oral BID    metoprolol tartrate  25 mg Oral BID    naloxegol  25 mg Oral QAM    budesonide-formoterol  2 puff Inhalation BID    aspirin EC  81 mg Oral Daily    insulin glargine  25 Units SubCUTAneous Dinner    insulin lispro  0-18 Units SubCUTAneous TID WC    insulin lispro  0-9 Units SubCUTAneous Nightly    sodium chloride flush  5-40 mL IntraVENous 2 times per day    cefTRIAXone (ROCEPHIN) IV  1,000 mg IntraVENous Q24H    vancomycin (VANCOCIN) intermittent dosing (placeholder)   Other RX Placeholder    vancomycin  750 mg IntraVENous Q12H           [facility]  MARCELO Ferrera CNP  Perfect [employer] messaging  OFFICE: [phone]      Electronically signed by MARCELO Ferrera CNP on 12/1/2021 at 10:48 AM  Thank you for allowing us to participate in the care of this patient. Please call with questions. This note iscreated with the assistance of a speech recognition program.  While intending to generate a document that actually reflects the content of the visit, the document can still have some errors including those of syntax andsound a like substitutions which may escape proof reading. In such instances, actual meaning can be extrapolated by contextual diversion.

## 2021-12-01 NOTE — PROGRESS NOTES
DATE: 2021    NAME: La Humphries  MRN: [id number]   : 1955    Patient not seen this date for Physical Therapy due to:      [] Cancel by RN or physician due to:    [] Hemodialysis    [] Critical Lab Value Level     [] Blood transfusion in progress    [] Acute or unstable cardiovascular status   _MAP < 55 or more than >115  _HR < 40 or > 130    [] Acute or unstable pulmonary status   -FiO2 > 60%   _RR < 5 or >40    _O2 sats < 85%    [] Strict Bedrest    [] Off Unit for surgery or procedure    [] Off Unit for testing       [] Pending imaging to R/O fracture    [] Refusal by Patient      [x] Other personal care, toileting       [] PT being discontinued at this time. Patient independent. No further needs. [] PT being discontinued at this time as the patient has been transferred to hospice care. No further needs.       RADHA CABALLERO, PTA
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
## 2021-12-01 NOTE — PROGRESS NOTES
[facility] Pharmacokinetic Monitoring Service - Vancomycin    Consulting Provider: Dr. Kvng Flores   Indication: SSTI  Target Concentration: Goal AUC/DARIAN 400-600 mg*hr/L  Day of Therapy: #7  Additional Antimicrobials: Rocephin    Pertinent Laboratory Values: Wt Readings from Last 1 Encounters:   12/01/21 154 lb 8 oz (70.1 kg)     Temp Readings from Last 1 Encounters:   12/01/21 97.4 °F (36.3 °C) (Oral)       Estimated Creatinine Clearance: 63 mL/min (based on SCr of 1.17 mg/dL). Recent Labs     11/30/21  0641   CREATININE 1.17       MRSA Nasal Swab: N/A. Non-respiratory infection. .    Recent vancomycin administrations                     vancomycin (VANCOCIN) 750 mg in dextrose 5 % 250 mL IVPB (mg) 750 mg New Bag 12/01/21 0329     750 mg New Bag 11/30/21 1751     750 mg New Bag  0339     750 mg New Bag 11/29/21 1639     750 mg New Bag  0300     750 mg New Bag 11/28/21 1711                    Assessment:  Date/Time Current Dose Concentration Timing of Concentration (h) AUC   11/30 @ 0641 750mg Q12h 28.9 3h post dose 624   Note: Serum concentrations collected for AUC dosing may appear elevated if collected in close proximity to the dose administered, this is not necessarily an indication of toxicity    Plan:  Current dosing regimen is supra-therapeutic  \"Decrease dose to 1250 mg Q24h  Repeat vancomycin concentration ordered for 12/3 with morning labs   Pharmacy will continue to monitor patient and adjust therapy as indicated    Thank you for the consult,  ELIZABETH Jose [facility]  12/1/2021 2:33 PM
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
## 2021-12-01 NOTE — PROGRESS NOTES
Patient calling out saying that both of his dressings covering his stumps \"just fell off\" while he is in the bed.

## 2021-12-01 NOTE — CARE COORDINATION
Social work; emily trying for [facility]. Will need philip, Rx and discharge order for snf at discharge.  Baljeet camacho

## 2021-12-02 VITALS
HEART RATE: 85 BPM | HEIGHT: 73 IN | BODY MASS INDEX: 20.12 KG/M2 | DIASTOLIC BLOOD PRESSURE: 62 MMHG | SYSTOLIC BLOOD PRESSURE: 126 MMHG | TEMPERATURE: 97.5 F | WEIGHT: 151.8 LBS | RESPIRATION RATE: 18 BRPM | OXYGEN SATURATION: 97 %

## 2021-12-02 LAB
GLUCOSE BLD-MCNC: 134 MG/DL (ref 75–110)
GLUCOSE BLD-MCNC: 176 MG/DL (ref 75–110)
GLUCOSE BLD-MCNC: 201 MG/DL (ref 75–110)

## 2021-12-02 PROCEDURE — 6370000000 HC RX 637 (ALT 250 FOR IP): Performed by: INTERNAL MEDICINE

## 2021-12-02 PROCEDURE — 94640 AIRWAY INHALATION TREATMENT: CPT

## 2021-12-02 PROCEDURE — 94761 N-INVAS EAR/PLS OXIMETRY MLT: CPT

## 2021-12-02 PROCEDURE — 99239 HOSP IP/OBS DSCHRG MGMT >30: CPT | Performed by: INTERNAL MEDICINE

## 2021-12-02 PROCEDURE — 82947 ASSAY GLUCOSE BLOOD QUANT: CPT

## 2021-12-02 PROCEDURE — 6360000002 HC RX W HCPCS: Performed by: INTERNAL MEDICINE

## 2021-12-02 PROCEDURE — 2580000003 HC RX 258: Performed by: INTERNAL MEDICINE

## 2021-12-02 RX ADMIN — Medication 10 ML: at 04:47

## 2021-12-02 RX ADMIN — SODIUM CHLORIDE, PRESERVATIVE FREE 10 ML: 5 INJECTION INTRAVENOUS at 11:32

## 2021-12-02 RX ADMIN — FERROUS SULFATE TAB EC 325 MG (65 MG FE EQUIVALENT) 325 MG: 325 (65 FE) TABLET DELAYED RESPONSE at 09:55

## 2021-12-02 RX ADMIN — PROBIOTIC PRODUCT - TAB 1 TABLET: TAB at 20:48

## 2021-12-02 RX ADMIN — METFORMIN HYDROCHLORIDE 500 MG: 500 TABLET ORAL at 10:04

## 2021-12-02 RX ADMIN — PREGABALIN 75 MG: 75 CAPSULE ORAL at 14:20

## 2021-12-02 RX ADMIN — FAMOTIDINE 20 MG: 20 TABLET ORAL at 09:55

## 2021-12-02 RX ADMIN — Medication 10 ML: at 16:01

## 2021-12-02 RX ADMIN — METFORMIN HYDROCHLORIDE 500 MG: 500 TABLET ORAL at 17:31

## 2021-12-02 RX ADMIN — METOPROLOL TARTRATE 25 MG: 25 TABLET, FILM COATED ORAL at 20:48

## 2021-12-02 RX ADMIN — VANCOMYCIN HYDROCHLORIDE 1250 MG: 5 INJECTION, POWDER, LYOPHILIZED, FOR SOLUTION INTRAVENOUS at 02:40

## 2021-12-02 RX ADMIN — PREGABALIN 75 MG: 75 CAPSULE ORAL at 09:55

## 2021-12-02 RX ADMIN — Medication 10 ML: at 20:48

## 2021-12-02 RX ADMIN — ATORVASTATIN CALCIUM 10 MG: 10 TABLET, FILM COATED ORAL at 20:48

## 2021-12-02 RX ADMIN — METOPROLOL TARTRATE 25 MG: 25 TABLET, FILM COATED ORAL at 09:54

## 2021-12-02 RX ADMIN — Medication 10 ML: at 09:56

## 2021-12-02 RX ADMIN — TROLAMINE SALICYLATE: 100 CREAM TOPICAL at 04:52

## 2021-12-02 RX ADMIN — DOCUSATE SODIUM 100 MG: 100 CAPSULE, LIQUID FILLED ORAL at 09:55

## 2021-12-02 RX ADMIN — DOCUSATE SODIUM 100 MG: 100 CAPSULE, LIQUID FILLED ORAL at 20:48

## 2021-12-02 RX ADMIN — ASPIRIN 81 MG: 81 TABLET, COATED ORAL at 09:55

## 2021-12-02 RX ADMIN — APIXABAN 5 MG: 5 TABLET, FILM COATED ORAL at 09:56

## 2021-12-02 RX ADMIN — MULTIPLE VITAMINS W/ MINERALS TAB 1 TABLET: TAB at 09:55

## 2021-12-02 RX ADMIN — BUDESONIDE AND FORMOTEROL FUMARATE DIHYDRATE 2 PUFF: 160; 4.5 AEROSOL RESPIRATORY (INHALATION) at 09:39

## 2021-12-02 RX ADMIN — AMITRIPTYLINE HYDROCHLORIDE 75 MG: 25 TABLET, FILM COATED ORAL at 20:48

## 2021-12-02 RX ADMIN — CEFTRIAXONE SODIUM 1000 MG: 1 INJECTION, POWDER, FOR SOLUTION INTRAMUSCULAR; INTRAVENOUS at 14:20

## 2021-12-02 RX ADMIN — MORPHINE SULFATE 4 MG: 4 INJECTION INTRAVENOUS at 06:43

## 2021-12-02 RX ADMIN — PROBIOTIC PRODUCT - TAB 1 TABLET: TAB at 09:55

## 2021-12-02 RX ADMIN — INSULIN GLARGINE 25 UNITS: 100 INJECTION, SOLUTION SUBCUTANEOUS at 17:31

## 2021-12-02 RX ADMIN — MORPHINE SULFATE 4 MG: 4 INJECTION INTRAVENOUS at 11:31

## 2021-12-02 RX ADMIN — TAMSULOSIN HYDROCHLORIDE 0.4 MG: 0.4 CAPSULE ORAL at 20:48

## 2021-12-02 RX ADMIN — SODIUM CHLORIDE, PRESERVATIVE FREE 10 ML: 5 INJECTION INTRAVENOUS at 09:56

## 2021-12-02 RX ADMIN — INSULIN LISPRO 3 UNITS: 100 INJECTION, SOLUTION INTRAVENOUS; SUBCUTANEOUS at 14:20

## 2021-12-02 RX ADMIN — NALOXEGOL OXALATE 25 MG: 25 TABLET, FILM COATED ORAL at 09:54

## 2021-12-02 RX ADMIN — APIXABAN 5 MG: 5 TABLET, FILM COATED ORAL at 20:48

## 2021-12-02 RX ADMIN — ALOGLIPTIN 25 MG: 25 TABLET, FILM COATED ORAL at 09:54

## 2021-12-02 RX ADMIN — MORPHINE SULFATE 4 MG: 4 INJECTION INTRAVENOUS at 17:31

## 2021-12-02 RX ADMIN — HYDROCORTISONE: 0.01 CREAM TOPICAL at 09:59

## 2021-12-02 RX ADMIN — FAMOTIDINE 20 MG: 20 TABLET ORAL at 20:48

## 2021-12-02 RX ADMIN — INSULIN LISPRO 6 UNITS: 100 INJECTION, SOLUTION INTRAVENOUS; SUBCUTANEOUS at 17:31

## 2021-12-02 ASSESSMENT — PAIN DESCRIPTION - PROGRESSION
CLINICAL_PROGRESSION: NOT CHANGED
CLINICAL_PROGRESSION: GRADUALLY WORSENING

## 2021-12-02 ASSESSMENT — PAIN DESCRIPTION - PAIN TYPE
TYPE: ACUTE PAIN;PHANTOM PAIN
TYPE: ACUTE PAIN;CHRONIC PAIN
TYPE: ACUTE PAIN;CHRONIC PAIN;PHANTOM PAIN

## 2021-12-02 ASSESSMENT — PAIN SCALES - GENERAL
PAINLEVEL_OUTOF10: 8
PAINLEVEL_OUTOF10: 10
PAINLEVEL_OUTOF10: 8
PAINLEVEL_OUTOF10: 8

## 2021-12-02 ASSESSMENT — PAIN DESCRIPTION - ONSET
ONSET: ON-GOING

## 2021-12-02 ASSESSMENT — PAIN DESCRIPTION - FREQUENCY
FREQUENCY: CONTINUOUS

## 2021-12-02 ASSESSMENT — ENCOUNTER SYMPTOMS
NAUSEA: 0
SHORTNESS OF BREATH: 0
ABDOMINAL PAIN: 0
COUGH: 0
VOMITING: 0
DIARRHEA: 1

## 2021-12-02 ASSESSMENT — PAIN DESCRIPTION - LOCATION
LOCATION: LEG

## 2021-12-02 ASSESSMENT — PAIN - FUNCTIONAL ASSESSMENT
PAIN_FUNCTIONAL_ASSESSMENT: PREVENTS OR INTERFERES SOME ACTIVE ACTIVITIES AND ADLS

## 2021-12-02 ASSESSMENT — PAIN DESCRIPTION - ORIENTATION
ORIENTATION: RIGHT;LEFT
ORIENTATION: LEFT
ORIENTATION: RIGHT;LEFT

## 2021-12-02 ASSESSMENT — PAIN DESCRIPTION - DESCRIPTORS
DESCRIPTORS: ACHING
DESCRIPTORS: THROBBING
DESCRIPTORS: ACHING

## 2021-12-02 NOTE — PROGRESS NOTES
Physical Therapy  DATE: 2021    NAME: Vera Garza  MRN: [id number]   : 1955    Patient not seen this date for Physical Therapy due to:      [] Cancel by RN or physician due to:    [] Hemodialysis    [] Critical Lab Value Level     [] Blood transfusion in progress    [] Acute or unstable cardiovascular status   _MAP < 55 or more than >115  _HR < 40 or > 130    [] Acute or unstable pulmonary status   -FiO2 > 60%   _RR < 5 or >40    _O2 sats < 85%    [] Strict Bedrest    [] Off Unit for surgery or procedure    [] Off Unit for testing       [] Pending imaging to R/O fracture    [x] Refusal by Patient  Patient stated that I could try back later if time allows. [] Other      [] PT being discontinued at this time. Patient independent. No further needs. [] PT being discontinued at this time as the patient has been transferred to hospice care. No further needs.       Benjyifford Lanes, PTA

## 2021-12-02 NOTE — CARE COORDINATION
Social work: auth received by [facility]. [facility] will be able to transport at 9:30 pm if ok with snf. .  Await an answer. Tang Lewis ([facility]) can transport at 8:30 am if this does not work.   Martha Tran [facility]

## 2021-12-02 NOTE — CARE COORDINATION
Social work: spoke to [facility] they accepted pt and are still trying to get an email. None today so far. Will need philip, Rx and discharge order for snf at discharge.  Farheen camacho

## 2021-12-02 NOTE — CARE COORDINATION
Social work: called daughter to advise pt is accepted at [facility]. Phone [phone] for report. Advised RN and [facility] will transport at 9:30 pm tonight ok with snf to arrive at that time. RN to inform pt of gloria camacho

## 2021-12-02 NOTE — PROGRESS NOTES
RT performed Symbicort MDI using a spacer with pt. Pt very argumentative with RT and insisted he did not get his Symbicort when RT pressed inhaler herself and put spacer in pt's mouth. Pt states that since spacer was not in mouth when MDI pressed, he did not get it. RT tried to explain proper MDI technique to pt, but pt would not listen. RN present in room.

## 2021-12-02 NOTE — PLAN OF CARE
Problem: Falls - Risk of:  Goal: Will remain free from falls  Description: Will remain free from falls  12/2/2021 1115 by Fabienne Narvaez RN  Outcome: Ongoing  12/2/2021 0224 by Vianey Dorman RN  Outcome: Ongoing  Note: Patient is a fall risk during this admission. Fall risk assessment was performed. Patient is absent of falls. Bed is in the lowest position. Wheels on the bed are locked. Call light and bed side table are within reach. Clutter is removed. Patient was educated to call out when needing assistance or wanting to get out of bed. Patient offered toileting assistance during rounding. Hourly rounds have been performed.     Goal: Absence of physical injury  Description: Absence of physical injury  12/2/2021 1115 by Fabienne Narvaez RN  Outcome: Ongoing  12/2/2021 0224 by Vianey Dorman RN  Outcome: Ongoing     Problem: Skin Integrity:  Goal: Will show no infection signs and symptoms  Description: Will show no infection signs and symptoms  12/2/2021 1115 by Fabienne Narvaez RN  Outcome: Ongoing  12/2/2021 0224 by Vianey Dorman RN  Outcome: Ongoing  Goal: Absence of new skin breakdown  Description: Absence of new skin breakdown  12/2/2021 1115 by Fabienne Narvaez RN  Outcome: Ongoing  12/2/2021 0224 by Vianey Dorman RN  Outcome: Ongoing     Problem: Physical Regulation:  Goal: Will remain free from infection  Description: Will remain free from infection  12/2/2021 1115 by Fabienne Narvaez RN  Outcome: Ongoing  12/2/2021 0224 by Vianey Dorman RN  Outcome: Ongoing     Problem: Respiratory:  Goal: Ability to maintain normal respiratory secretions will improve  Description: Ability to maintain normal respiratory secretions will improve  12/2/2021 1115 by Fabienne Narvaez RN  Outcome: Ongoing  12/2/2021 0224 by Vianey Dorman RN  Outcome: Ongoing     Problem: Metabolic:  Goal: Ability to maintain appropriate glucose levels will improve  Description: Ability to maintain appropriate glucose levels will improve  12/2/2021 1115 by Marnie Nelson RN  Outcome: Ongoing  12/2/2021 0224 by Asha Leyva RN  Outcome: Ongoing     Problem: Pain:  Goal: Pain level will decrease  Description: Pain level will decrease  12/2/2021 1115 by Marnie Nelson RN  Outcome: Ongoing  12/2/2021 0224 by Asha Leyva RN  Outcome: Ongoing  Goal: Control of acute pain  Description: Control of acute pain  12/2/2021 1115 by Marnie Nelson RN  Outcome: Ongoing  12/2/2021 0224 by Asha Leyva RN  Outcome: Ongoing  Goal: Control of chronic pain  Description: Control of chronic pain  12/2/2021 1115 by Marnie Nelson RN  Outcome: Ongoing  12/2/2021 0224 by Asha Leyva RN  Outcome: Ongoing     Problem: Tissue Perfusion - Cardiopulmonary, Altered:  Goal: Absence of angina  Description: Absence of angina  Outcome: Ongoing

## 2021-12-02 NOTE — PLAN OF CARE
Problem: Falls - Risk of:  Goal: Will remain free from falls  Description: Will remain free from falls  12/2/2021 0224 by Maria A Ortiz RN  Outcome: Ongoing  Note: Patient is a fall risk during this admission. Fall risk assessment was performed. Patient is absent of falls. Bed is in the lowest position. Wheels on the bed are locked. Call light and bed side table are within reach. Clutter is removed. Patient was educated to call out when needing assistance or wanting to get out of bed. Patient offered toileting assistance during rounding. Hourly rounds have been performed.        Problem: Falls - Risk of:  Goal: Absence of physical injury  Description: Absence of physical injury  12/2/2021 0224 by Maria A Ortiz RN  Outcome: Ongoing     Problem: Skin Integrity:  Goal: Will show no infection signs and symptoms  Description: Will show no infection signs and symptoms  12/2/2021 0224 by Maria A Ortiz RN  Outcome: Ongoing     Problem: Skin Integrity:  Goal: Absence of new skin breakdown  Description: Absence of new skin breakdown  12/2/2021 0224 by Maria A Ortiz RN  Outcome: Ongoing     Problem: Physical Regulation:  Goal: Will remain free from infection  Description: Will remain free from infection  12/2/2021 0224 by Maria A Ortiz RN  Outcome: Ongoing     Problem: Respiratory:  Goal: Ability to maintain normal respiratory secretions will improve  Description: Ability to maintain normal respiratory secretions will improve  12/2/2021 0224 by Maria A Ortiz RN  Outcome: Ongoing     Problem: Metabolic:  Goal: Ability to maintain appropriate glucose levels will improve  Description: Ability to maintain appropriate glucose levels will improve  12/2/2021 0224 by Maria A Ortiz RN  Outcome: Ongoing     Problem: Pain:  Goal: Pain level will decrease  Description: Pain level will decrease  12/2/2021 0224 by Maria A Ortiz RN  Outcome: Ongoing     Problem: Pain:  Goal: Control of acute pain  Description: Control of acute pain  12/2/2021 0224 by Aydin Mansfield RN  Outcome: Ongoing     Problem: Pain:  Goal: Control of chronic pain  Description: Control of chronic pain  12/2/2021 0224 by Aydin Mansfield RN  Outcome: Ongoing

## 2021-12-03 NOTE — PROGRESS NOTES
Patient was picked up via [facility]. He is being transported to [facility]. All belongings were gathered. Vitals were done and all questions were answered.
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
## 2021-12-06 ENCOUNTER — HOSPITAL ENCOUNTER (OUTPATIENT)
Age: 66
Setting detail: SPECIMEN
Discharge: HOME | End: 2021-12-06

## 2021-12-06 LAB
VANCOMYCIN TROUGH DATE LAST DOSE: NORMAL
VANCOMYCIN TROUGH DOSE AMOUNT: NORMAL
VANCOMYCIN TROUGH TIME LAST DOSE: NORMAL
VANCOMYCIN TROUGH: 16.7 UG/ML (ref 10–20)

## 2021-12-06 PROCEDURE — 36415 COLL VENOUS BLD VENIPUNCTURE: CPT

## 2021-12-06 PROCEDURE — 80202 ASSAY OF VANCOMYCIN: CPT

## 2021-12-06 PROCEDURE — P9603 ONE-WAY ALLOW PRORATED MILES: HCPCS

## 2021-12-07 ENCOUNTER — TELEPHONE (OUTPATIENT)
Dept: GASTROENTEROLOGY | Age: 66
End: 2021-12-07

## 2021-12-08 NOTE — DISCHARGE INSTR - COC
Continuity of Care Form    Patient Name: Kylie Crowell   :  1955  MRN:  [id number]    Admit date:  2021  Discharge date:  ***    Code Status Order: Prior   Advance Directives:      Admitting Physician:  No admitting provider for patient encounter. PCP: MARCELO Menchaca CNP    Discharging Nurse: [facility] Unit/Room#: No information available for this encounter. Discharging Unit Phone Number: ***    Emergency Contact:   Extended Emergency Contact Information  Primary Emergency Contact: Shaila Paulino 81, Kris Reid Daysi [address] Phone: [phone]  Relation: Child   needed?  No  Secondary Emergency Contact: Fartun Baeza  Address: [address]  Home Phone: [phone]  Relation: Parent    Past Surgical History:  Past Surgical History:   Procedure Laterality Date    COLONOSCOPY  2015    hyperplastic polyp    COLONOSCOPY  2017    ESOPHAGECTOMY      cancer    FOOT DEBRIDEMENT Left 2021    I&D LEFT FOOT WITH REMOVAL OF NONVIABLE BONE AND SOFT TISSUE performed by Alfreda Silva DPM at [address] [place] [address] Left 2021    LEFT FOOT DEBRIDEMENT WITH REMOVAL ALL NON VIABLE SOFT TISSUE AND BONE performed by Alfreda Silva DPM at [place] [address] Right 2016    I & D heel    FOOT SURGERY Right 2016    I & D    FRACTURE SURGERY Left 2015    humerus left, left leg    HC CATH POWER PICC SINGLE  2021         IR INS PICC VAD W SQ PORT GREATER THAN 5  2020    IR INS PICC VAD W SQ PORT GREATER THAN 5 2020 [facility]    IR INS PICC VAD W SQ PORT GREATER THAN 5  2021    IR INS PICC VAD W SQ PORT GREATER THAN 5 2021 [facility]    IR INS PICC VAD W SQ PORT GREATER THAN 5  2021    IR INS PICC VAD W SQ PORT GREATER THAN 5 2021 [facility]    LEG AMPUTATION BELOW KNEE Right 2017    LEG AMPUTATION BELOW KNEE Left 2021 LEFT  LEG AMPUTATION BELOW KNEE performed by Rafaela Hale MD at [facility] Left 4/6/2021    STUMP DEBRIDEMENT INCISION AND DRAINAGE performed by Rafaela Hale MD at [address] [facility] Right 2014    rt 3rd through 5th digits    TOE AMPUTATION Left 5/26/2016    left foot 5th toe    TOE AMPUTATION Left 8/5/2020    FOOT TRANSMETATARSAL  AMPUTATION - AND LEFT PECUTANEOUS TENDO ACHILLES LENGTHENING performed by Brooke Simms DPM at [address] Left 8/24/2020    REVISION  TRANSMETATARSAL AMPUTATION WITH DEBRIDEMENT. performed by Brooke Simms DPM at [place] [address]      5/14/13- with dilation    VASCULAR SURGERY Right 01/16/2017    foot guillotine amputation       Immunization History:   Immunization History   Administered Date(s) Administered    Influenza Vaccine, unspecified formulation 10/10/2016    Influenza Virus Vaccine 11/03/2014, 10/29/2015    Influenza, Mart[place] Deal, IM, (6 mo and older Fluzone, Flulaval, Fluarix and 3 yrs and older Afluria) 10/10/2016    Pneumococcal Polysaccharide (Fatrqkovd01) 09/05/2012, 10/29/2015    Tdap (Boostrix, Adacel) 07/01/2019       Active Problems:  Patient Active Problem List   Diagnosis Code    Type 2 diabetes mellitus with diabetic polyneuropathy, with long-term current use of insulin ([facility]) E11.42, Z79.4    Neuropathic pain, leg M79.2    Diabetic polyneuropathy ([facility] E11.42    Allergic rhinitis J30.9    Tobacco dependence F17.200    Edentulous K08.109    Dysphagia R13.10    Lung nodules R91.8    Esophageal cancer ([facility]) C15.9    Fracture of humerus, left, closed S42.302A    Closed fracture of humerus S42.309A    DM type 2 with diabetic peripheral neuropathy ([facility]) E11.42    COPD without exacerbation ([facility]) J44.9    HLD (hyperlipidemia) E78.5    Gastroesophageal reflux disease K21.9    Chronic pain syndrome G89.4    Marijuana use F12.90    History of colon polyps Z86.010    Functional diarrhea K59.1    Sepsis due to methicillin resistant Staphylococcus aureus (MRSA) ([facility] [address] [address] A41.02    History of esophageal cancer Z85.01    Osteomyelitis, chronic, ankle or foot ([facility]) M86.679    PAD (peripheral artery disease) ([facility]) I73.9    Other pulmonary embolism without acute cor pulmonale ([facility]) I26.99    Carotid stenosis, asymptomatic, bilateral I65.23    Lower limb amputation status Z89.619    Fx humeral neck, right, closed, initial encounter S42.211A    Transient hypotension I95.9    Constipation due to opioid therapy K59.03, T40.2X5A    Acute kidney injury ([facility] [address] T74.9    Complication of below knee amputation stump ([facility]) T87.9    COPD exacerbation ([facility]) J44.1    Hyponatremia E87.1    Pain, phantom limb ([facility]) G54.6    S/P BKA (below knee amputation) bilateral ([facility]) Z89.512, Z89.511    Moderate protein malnutrition ([facility]) E44.0    Essential hypertension I10    Uncontrolled type 2 diabetes mellitus with hyperglycemia ([facility]) E11.65    History of pulmonary embolism - 2017 Z86.711    Atelectasis J98.11    Uncontrolled type 2 diabetes mellitus with foot ulcer ([facility]) E11.621, L97.509, E11.65    Azotemia R79.89    Anemia, normocytic normochromic D64.9    Drug-induced constipation K59.03    Osteomyelitis of metatarsals of left foot ([facility]) M86.9    Diabetic foot infection ([facility]) E11.628, L08.9    Noncompliance Z91.19    Type 1 diabetes mellitus with diabetic foot infection ([facility]) E10.628, L08.9    Acute osteomyelitis of left foot ([facility]) M86.172    Cellulitis of left foot L03. 116    Mild malnutrition ([facility]) E44.1    Hypocalcemia E83.51    Hypokalemia E87.6    Moderate malnutrition ([facility]) E44.0    History of below knee amputation, right ([facility] [address] Z89.511    Foot ulcer with fat layer exposed, left ([facility] [address] L97.522    Chronic refractory osteomyelitis of left foot ([facility]) M86.672    Sepsis ([facility]) A41.9    Pyogenic inflammation of bone ([facility]) M86.9    Cellulitis of left lower extremity L03.116    History of below knee amputation, left ([facility]) Z89.512    Leg ulcer, left, with fat layer exposed ([facility] [address] L97.922    S/P amputation Z89.9    Tobacco abuse Z72.0    Pneumonia of right lower lobe due to infectious organism J18.9    Abdominal pain R10.9    Marijuana abuse F12.10    Parapneumonic effusion J18.9, J91.8    Hiatus hernia -large Q63.3    Metabolic encephalopathy K84.39    Altered mental status R41.82    Hyperkalemia E87.5       Isolation/Infection:   Isolation            No Isolation          Patient Infection Status       Infection Onset Added Last Indicated Last Indicated By Review Planned Expiration Resolved Resolved By    MRSA 02/03/21 02/05/21 11/04/21 Culture, Anaerobic and Aerobic        2/2021 blood  Foot 4/3/21    VRE 08/24/20 08/27/20 08/24/20 Culture, Anaerobic and Aerobic        Foot - 12/2020    Resolved    C-diff Rule Out 11/24/21 11/25/21 11/25/21 C DIFF TOXIN/ANTIGEN (Ordered)   11/26/21 Rule-Out Test Resulted    COVID-19 (Rule Out) 11/03/21 11/03/21 11/03/21 COVID-19, Rapid (Ordered)   11/03/21 Rule-Out Test Resulted    COVID-19 (Rule Out) 08/29/21 08/29/21 08/29/21 Respiratory Panel, Molecular, with COVID-19 (Restricted: peds pts or suitable admitted adults) (Ordered)   08/30/21 Cathie Rivera RN    COVID-19 (Rule Out) 08/29/21 08/29/21 08/29/21 COVID-19, Rapid (Ordered)   08/29/21 Rule-Out Test Resulted    C-diff Rule Out 05/25/21 05/25/21 05/25/21 C DIFF TOXIN/ANTIGEN (Ordered)   05/27/21 Yuri Whaley RN    C-diff Rule Out 01/11/21 01/11/21 01/11/21 C DIFF TOXIN/ANTIGEN (Ordered)   02/05/21 Grant Bose RN    COVID-19 (Rule Out) 08/22/20 08/22/20 08/23/20 COVID-19 (Ordered)   08/23/20 Rule-Out Test Resulted    COVID-19 (Rule Out) 08/01/20 08/01/20 08/01/20 COVID-19 (Ordered)   08/02/20 Meeta Pedro RN    Test discontinued - negative result this admission    MRSA  01/02/17 01/02/17 Meeta Pedro RN   07/31/20 Meeta Pedro RN    2 negative nasal screen - 2020  Foot - 6/2018            Nurse Assessment:  Last Vital Signs:  There were no vitals taken for this visit. Last documented pain score (0-10 scale):    Last Weight:   Wt Readings from Last 1 Encounters:   12/02/21 151 lb 12.8 oz (68.9 kg)     Mental Status:  {IP PT MENTAL STATUS:20030}    IV Access:  { [facility] IV ACCESS:483953319}    Nursing Mobility/ADLs:  Walking   {P DME PACJ:447253021}  Transfer  {[facility] DME TRFF:134079351}  Bathing  {[facility] DME BIPE:282625801}  Dressing  {[facility] DME LHJR:241584464}  Toileting  {P DME ALVS:034395039}  Feeding  {[facility] DME DFMK:828675858}  Med Admin  {[facility] DME JHDY:788271931}  Med Delivery   { [facility] MED Delivery:914080150}    Wound Care Documentation and Therapy:  Wound 11/03/21 Leg Left BKA stump wound (Active)   Wound Image   11/29/21 1958   Wound Etiology Diabetic 12/01/21 0720   Dressing Status Clean; Dry; Intact 12/02/21 1600   Wound Cleansed Cleansed with saline 12/01/21 1906   Dressing/Treatment Alginate with Ag; ABD; Roll gauze;  Ace wrap 12/02/21 1600   Dressing Change Due 12/03/21 12/02/21 1600   Wound Assessment Other (Comment) 12/02/21 1600   Drainage Amount None 12/02/21 1600   Drainage Description Serosanguinous 11/29/21 1958   Odor None 11/29/21 1958   Odalys-wound Assessment Blanchable erythema 11/29/21 1958   Margins Undefined edges 11/29/21 1958   Wound Thickness Description not for Pressure Injury Full thickness 11/29/21 1958   Number of days: 34       Wound Leg Right BKA stump wound (Active)   Wound Image   11/29/21 1958   Wound Etiology Diabetic 11/29/21 1958   Dressing Status Other (Comment) 12/02/21 1600   Wound Cleansed Wound cleanser 12/02/21 1600   Dressing/Treatment Other (comment) 12/02/21 1600   Dressing Change Due 12/04/21 12/01/21 2330   Wound Length (cm) 1.5 cm 11/29/21 1958   Wound Width (cm) 2 cm 11/29/21 1958   Wound Depth (cm) 0.2 cm 11/29/21 1958   Wound Surface Area (cm^2) 3 cm^2 11/29/21 1958   Wound Volume (cm^3) 0.6 cm^3 11/29/21 1958   Wound Assessment Pink/red; Granulation tissue 12/02/21 1600   Drainage Amount None 12/02/21 1600 Drainage Description Serosanguinous 12/02/21 1600   Odor None 11/30/21 2317   Odalys-wound Assessment Blanchable erythema 12/02/21 1600   Margins Attached edges; Defined edges 12/02/21 1600   Wound Thickness Description not for Pressure Injury Full thickness 12/02/21 1600   Number of days:        Wound Leg Left; Lateral; Lower (Active)   Wound Image   11/29/21 1958   Wound Etiology Diabetic 11/29/21 1958   Dressing Status Clean; Dry; Intact 12/02/21 1600   Wound Cleansed Cleansed with saline 11/30/21 1858   Dressing/Treatment Alginate with Ag; ABD; Roll gauze; Ace wrap 12/02/21 1600   Dressing Change Due 12/01/21 11/29/21 1958   Wound Length (cm) 3.5 cm 11/29/21 1958   Wound Width (cm) 2.9 cm 11/29/21 1958   Wound Depth (cm) 2 cm 11/29/21 1958   Wound Surface Area (cm^2) 10.15 cm^2 11/29/21 1958   Wound Volume (cm^3) 20.3 cm^3 11/29/21 1958   Tunneling Position ___ O'Clock approx 1cm beyond 9 o'clock wound depth 11/29/21 1958   Wound Assessment Other (Comment) 12/02/21 1600   Drainage Amount None 12/02/21 1600   Drainage Description Serosanguinous 11/29/21 1958   Odor None 11/29/21 1958   Odalys-wound Assessment Blanchable erythema 11/29/21 1958   Margins Attached edges; Defined edges 11/29/21 1958   Wound Thickness Description not for Pressure Injury Full thickness 11/29/21 1958   Number of days:         Elimination:  Continence: Bowel: {YES / OI:92860}  Bladder: {YES / VW:09482}  Urinary Catheter: {Urinary Catheter:648606353}   Colostomy/Ileostomy/Ileal Conduit: {YES / FS:80171}       Date of Last BM: ***  No intake or output data in the 24 hours ending 12/08/21 1343  No intake/output data recorded.     Safety Concerns:     [address] [employer] Safety Concerns:354760618}    Impairments/Disabilities:      [address] [employer] Impairments/Disabilities:078012151}    Nutrition Therapy:  Current Nutrition Therapy:   [employer] Diet List:002466421}    Routes of Feeding: {[facility] DME Other Feedings:452722330}  Liquids: {Slp liquid thickness:22348}  Daily Fluid Restriction: {[facility] DME Yes amt example:768453165}  Last Modified Barium Swallow with Video (Video Swallowing Test): {Done Not Done JDOE:029283794}    Treatments at the Time of Hospital Discharge:   Respiratory Treatments: ***  Oxygen Therapy:  {Therapy; copd oxygen:28890}  Ventilator:    {MH CC Vent JMT}    Rehab Therapies: {THERAPEUTIC INTERVENTION:6156449486}  Weight Bearing Status/Restrictions: { CC Weight Bearin}  Other Medical Equipment (for information only, NOT a DME order):  {EQUIPMENT:939057150}  Other Treatments: ***    Patient's personal belongings (please select all that are sent with patient):  {[facility] DME Belongings:398970886}    RN SIGNATURE:  {Esignature:754821631}    CASE MANAGEMENT/SOCIAL WORK SECTION    Inpatient Status Date: ***    Readmission Risk Assessment Score:  Readmission Risk              Risk of Unplanned Readmission:  0           Discharging to Facility/ Agency   Name:   Address:  Phone:  Fax:    Dialysis Facility (if applicable)   Name:  Address:  Dialysis Schedule:  Phone:  Fax:    / signature: {Esignature:392459753}    PHYSICIAN SECTION    Prognosis: {Prognosis:9279082209}    Condition at Discharge: [address] [place] [address] Patient Condition:826805499}    Rehab Potential (if transferring to Rehab): {Prognosis:3623929364}    Recommended Labs or Other Treatments After Discharge: ***    Physician Certification: I certify the above information and transfer of Rosalia Gonzales  is necessary for the continuing treatment of the diagnosis listed and that he requires {Admit to Appropriate Level of Care:47147} for {GREATER/LESS:461827503} 30 days.      Update Admission H&P: {CHP DME Changes in SNOFK:533008440}    PHYSICIAN SIGNATURE:  {Esignature:581309407}

## 2021-12-12 NOTE — PROGRESS NOTES
[facility]  Progress Note    Sapphire Ackerman  MRN: [id number]  Date: 2021  LOS: 8     Reason for F/U :   Left BKA stump infection    Impression :   1. Infected left below the knee amputation stump status post debridement and has a polymicrobial infection    Recommendations:   · Continue intravenous antimicrobial therapy with cefepime and vancomycin through May 16, 2021  · The patient continues to have erythema at the BKA stump and there is a wound VAC in place. · The plan is for discharge when all arrangements have been made    Infection Control Recommendations:   Contact precautions    Discharge Planning:   Estimated Length of IV antimicrobials: Through May 16, 2021  Patient will need PICC line Insertion  Patient will need: Home IV  versus SNF  Patient willneed outpatient wound care: Yes    Medical Decision making / Summary of Stay:   Sapphire Ackerman is a 63y.o.-year-old male presented to hospital with to open wounds to the left below-knee amputation stump site associated with pain, redness and drainage worsening over several days. His symptoms is moderate, no alleviating or aggravating factors, pain is dull, intermittent. The patient had history of peripheral vascular disease, left foot infection required below-knee amputation in 2021 he also had MRSA bacteremia that was treated with IV vancomycin. Current evaluation:2021    BP (!) 137/56   Pulse 93   Temp 97.5 °F (36.4 °C) (Oral)   Resp 17   Ht 6' (1.829 m)   Wt 147 lb (66.7 kg)   SpO2 98%   BMI 19.94 kg/m²     Temperature Range: Temp: 97.5 °F (36.4 °C) Temp  Av.8 °F (36.6 °C)  Min: 97.5 °F (36.4 °C)  Max: 98.1 °F (36.7 °C)  The patient is seen and evaluated at bedside he is awake and alert in no acute distress. No subjective fever or chills, no abdominal pain nausea vomiting. Review of Systems   Constitutional: Negative. Respiratory: Negative. Cardiovascular: Negative.     Gastrointestinal: Negative. Genitourinary: Negative. Musculoskeletal: Negative. Skin: Positive for color change and wound. Allergic/Immunologic: Negative. Neurological: Negative. Physical Examination :     Physical Exam  Constitutional:       Appearance: He is well-developed. HENT:      Head: Normocephalic and atraumatic. Neck:      Musculoskeletal: Normal range of motion and neck supple. Cardiovascular:      Rate and Rhythm: Normal rate. Heart sounds: Normal heart sounds. No friction rub. No gallop. Pulmonary:      Effort: Pulmonary effort is normal.      Breath sounds: Normal breath sounds. No wheezing. Abdominal:      General: Bowel sounds are normal.      Palpations: Abdomen is soft. There is no mass. Tenderness: There is no abdominal tenderness. Musculoskeletal:      Comments: There is some erythroderma at the left BKA stump and there is a wound VAC in place   Lymphadenopathy:      Cervical: No cervical adenopathy. Skin:     General: Skin is warm and dry. Neurological:      Mental Status: He is alert and oriented to person, place, and time. Laboratory data:   I have independently reviewed the followinglabs:  CBC with Differential: No results for input(s): WBC, HGB, HCT, PLT, SEGSPCT, BANDSPCT, LYMPHOPCT, MONOPCT, EOSPCT in the last 72 hours. BMP:   Recent Labs     04/10/21  0629   BUN 15   CREATININE 1.00     Hepatic Function Panel: No results for input(s): PROT, LABALBU, BILIDIR, IBILI, BILITOT, ALKPHOS, ALT, AST in the last 72 hours.       No results found for: PROCAL  Lab Results   Component Value Date    CRP 91.6 04/05/2021    .0 02/03/2021    CRP 46.6 01/07/2021     Lab Results   Component Value Date    SEDRATE 61 (H) 04/05/2021         Lab Results   Component Value Date    DDIMER 0.39 06/29/2020    DDIMER 1.63 12/20/2017    DDIMER 0.68 12/25/2011     Lab Results   Component Value Date    FERRITIN 56 02/09/2021    FERRITIN 64 01/25/2014     No results found for: LDH  No results found for: FIBRINOGEN    Results in Past 30 Days  Result Component Current Result Ref Range Previous Result Ref Range   SARS-CoV-2, Rapid Not Detected (4/6/2021) Not Detected Not in Time Range      Lab Results   Component Value Date    COVID19 Not Detected 04/06/2021    COVID19 Not Detected 02/04/2021    COVID19 Not Detected 12/31/2020    COVID19 Not Detected 08/23/2020       No results for input(s): VANCOTROUGH in the last 72 hours. Imaging Studies:   No new imaging    Cultures:     Culture, Blood 2 [[id number]] Collected: 04/03/21 1935   Order Status: Completed Specimen: Blood Updated: 04/09/21 0018    Specimen Description . BLOOD    Special Requests RFA    Culture NO GROWTH 6 DAYS   Culture, Blood 1 [[id number]] Collected: 04/03/21 1935   Order Status: Completed Specimen: Blood Updated: 04/09/21 0018    Specimen Description . BLOOD    Special Requests RAC    Culture NO GROWTH 6 DAYS   Culture, Wound [[id number]] (Abnormal)  Collected: 04/03/21 1950   Order Status: Completed Specimen: Ulcer Updated: 04/08/21 1209    Specimen Description . ULCER    Special Requests NOT REPORTED    Direct Exam MODERATE NEUTROPHILSAbnormal      MANY GRAM NEGATIVE RODSAbnormal      MANY GRAM POSITIVE COCCI IN PAIRSAbnormal      FEW GRAM POSITIVE COCCI IN CLUSTERSAbnormal     Culture METHICILLIN RESISTANT STAPHYLOCOCCUS AUREUS MODERATE GROWTHAbnormal      PSEUDOMONAS AERUGINOSA MODERATE GROWTHAbnormal      ENTEROBACTER CLOACAE LIGHT GROWTHAbnormal    Pseudomonas aeruginosa (2)    Antibiotic Interpretation DARIAN Status    amikacin   Final     NOT REPORTED    ceFAZolin   Final     NOT REPORTED    cefepime Sensitive  Final     2   SUSCEPTIBLE   ciprofloxacin Sensitive  Final     <=0.25   SUSCEPTIBLE   gentamicin Sensitive  Final     <=1   SUSCEPTIBLE   meropenem   Final     NOT REPORTED    tigecycline   Final     NOT REPORTED    tobramycin Sensitive  Final     <=1   SUSCEPTIBLE   piperacillin-tazobactam Sensitive  Final 8   SUSCEPTIBLE   Enterobacter cloacae (3)    Antibiotic Interpretation DARIAN Status    amikacin   Final     NOT REPORTED    aztreonam Sensitive  Final     <=1   SUSCEPTIBLE   ceFAZolin Resistant  Final     >=64   RESISTANT   cefepime Sensitive  Final     <=1   SUSCEPTIBLE   cefTRIAXone Sensitive  Final     4   SUSCEPTIBLE   ciprofloxacin Intermediate  Final     2   INTERMEDIATE   ertapenem   Final     NOT REPORTED    gentamicin Sensitive  Final     <=1   SUSCEPTIBLE   meropenem   Final     NOT REPORTED    nitrofurantoin   Final     NOT REPORTED    tigecycline   Final     NOT REPORTED    tobramycin Sensitive  Final     <=1   SUSCEPTIBLE   trimethoprim-sulfamethoxazole Resistant  Final     >=320   RESISTANT   piperacillin-tazobactam Sensitive  Final     8   SUSCEPTIBLE   Methicillin resistant staphylococcus aureus (1)    Antibiotic Interpretation DARIAN Status    penicillin Resistant  Final     >=0.5   RESISTANT   cefoxitin screen  NOT REPORTED Final    ciprofloxacin   Final     NOT REPORTED    clindamycin Resistant  Final     <=0.25   RESISTANT   erythromycin Resistant  Final     4   RESISTANT   gentamicin Sensitive  Final     <=0.5   SUSCEPTIBLE   gentamicin Sensitive Gentamicin is used only in combination with other active agents that test susceptible.  Final    Induced Clind Resist Positive POSITIVE Final    levofloxacin Sensitive  Final     <=0.12   SUSCEPTIBLE   linezolid   Final     NOT REPORTED    moxifloxacin   Final     NOT REPORTED    nitrofurantoin   Final     NOT REPORTED    oxacillin Resistant  Final     >=4   RESISTANT   Synercid   Final     NOT REPORTED    rifampin   Final     NOT REPORTED    tetracycline Sensitive  Final     <=1   SUSCEPTIBLE   tigecycline   Final     NOT REPORTED    trimethoprim-sulfamethoxazole Sensitive  Final     <=10   SUSCEPTIBLE   vancomycin Sensitive  Final     1   SUSCEPTIBLE       COVID-19, Rapid [[id number]] Collected: 04/06/21 1598   Order Status: Completed Specimen: Nasopharyngeal Swab Updated: 04/06/21 0818    Specimen Description . NASOPHARYNGEAL SWAB    SARS-CoV-2, Rapid Not Detected    Comment:        Rapid NAAT:  The specimen is NEGATIVE for SARS-CoV-2, the novel coronavirus associated with   COVID-19.         The ID NOW COVID-19 assay is designed to detect the virus that causes COVID-19 in patients   with signs and symptoms of infection who are suspected of COVID-19. An individual without symptoms of COVID-19 and who is not shedding SARS-CoV-2 virus would   expect to have a negative (not detected) result in this assay. Negative results should be treated as presumptive and, if inconsistent with clinical signs   and symptoms or necessary for patient management,   should be tested with an alternative molecular assay.  Negative results do not preclude   SARS-CoV-2 infection and   should not be used as the sole basis for patient management decisions.         Fact sheet for Healthcare Providers: Lazaro   Fact sheet for Patients: Lazaro           Methodology: Isothermal Nucleic Acid Amplification             Medications:      insulin glargine  28 Units Subcutaneous Dinner    budesonide-formoterol  2 puff Inhalation BID    tamsulosin  0.4 mg Oral Daily    sennosides-docusate sodium  2 tablet Oral BID    cefepime  2,000 mg Intravenous Q12H    amitriptyline  75 mg Oral Nightly    apixaban  5 mg Oral BID    famotidine  20 mg Oral BID    lactobacillus  1 tablet Oral BID    sodium chloride flush  10 mL Intravenous 2 times per day    insulin lispro  0-6 Units Subcutaneous TID WC    insulin lispro  0-3 Units Subcutaneous Nightly    vancomycin  1,000 mg Intravenous Q12H    vancomycin (VANCOCIN) intermittent dosing (placeholder)   Other RX Placeholder           [facility]  [address]  Perfect Serve messaging  OFFICE: [phone]      Electronically signed by Attila Hernandez Dylan Smith MD on 4/11/2021 at 12:10 PM  Thank you for allowing us to participate in the care of this patient. Please call with questions. This note iscreated with the assistance of a speech recognition program.  While intending to generate a document that actually reflects the content of the visit, the document can still have some errors including those of syntax andsound a like substitutions which may escape proof reading. In such instances, actual meaning can be extrapolated by contextual diversion. Monitor your symptoms and return to ER for bleeding that is uncontrolled with pressure. Follow up with your PCP to discuss our blood thinner dose change. Avoid hard foods and stick to a soft diet until seeing your PCP.     SEEK IMMEDIATE MEDICAL CARE IF YOU HAVE ANY OF THE FOLLOWING SYMPTOMS: extreme weakness/chest pain/shortness of breath, worsening bleeding, fainting, fever, or any signs of dehydration.

## 2021-12-13 ENCOUNTER — HOSPITAL ENCOUNTER (OUTPATIENT)
Age: 66
Setting detail: SPECIMEN
Discharge: HOME | End: 2021-12-13

## 2021-12-13 LAB
VANCOMYCIN TROUGH DATE LAST DOSE: ABNORMAL
VANCOMYCIN TROUGH DOSE AMOUNT: ABNORMAL
VANCOMYCIN TROUGH TIME LAST DOSE: ABNORMAL
VANCOMYCIN TROUGH: 21.3 UG/ML (ref 10–20)

## 2021-12-13 PROCEDURE — 36415 COLL VENOUS BLD VENIPUNCTURE: CPT

## 2021-12-13 PROCEDURE — 80202 ASSAY OF VANCOMYCIN: CPT

## 2021-12-13 PROCEDURE — P9603 ONE-WAY ALLOW PRORATED MILES: HCPCS

## 2021-12-14 ENCOUNTER — HOSPITAL ENCOUNTER (OUTPATIENT)
Age: 66
Setting detail: SPECIMEN
Discharge: HOME | End: 2021-12-14

## 2021-12-14 LAB
VANCOMYCIN TROUGH DATE LAST DOSE: NORMAL
VANCOMYCIN TROUGH DOSE AMOUNT: NORMAL
VANCOMYCIN TROUGH TIME LAST DOSE: NORMAL
VANCOMYCIN TROUGH: 14.2 UG/ML (ref 10–20)

## 2021-12-14 PROCEDURE — P9603 ONE-WAY ALLOW PRORATED MILES: HCPCS

## 2021-12-14 PROCEDURE — 80202 ASSAY OF VANCOMYCIN: CPT

## 2021-12-14 PROCEDURE — 36415 COLL VENOUS BLD VENIPUNCTURE: CPT

## 2021-12-16 ENCOUNTER — HOSPITAL ENCOUNTER (OUTPATIENT)
Age: 66
Setting detail: SPECIMEN
Discharge: HOME | End: 2021-12-16

## 2021-12-16 LAB
VANCOMYCIN TROUGH DATE LAST DOSE: ABNORMAL
VANCOMYCIN TROUGH DOSE AMOUNT: ABNORMAL
VANCOMYCIN TROUGH TIME LAST DOSE: ABNORMAL
VANCOMYCIN TROUGH: 8.5 UG/ML (ref 10–20)

## 2021-12-16 PROCEDURE — P9603 ONE-WAY ALLOW PRORATED MILES: HCPCS

## 2021-12-16 PROCEDURE — 36415 COLL VENOUS BLD VENIPUNCTURE: CPT

## 2021-12-16 PROCEDURE — 80202 ASSAY OF VANCOMYCIN: CPT

## 2021-12-20 ENCOUNTER — HOSPITAL ENCOUNTER (OUTPATIENT)
Age: 66
Setting detail: SPECIMEN
Discharge: HOME | End: 2021-12-20

## 2021-12-20 LAB
ANION GAP SERPL CALCULATED.3IONS-SCNC: 16 MMOL/L (ref 9–17)
BUN BLDV-MCNC: 32 MG/DL (ref 8–23)
BUN/CREAT BLD: ABNORMAL (ref 9–20)
CALCIUM SERPL-MCNC: 8.8 MG/DL (ref 8.6–10.4)
CHLORIDE BLD-SCNC: 108 MMOL/L (ref 98–107)
CO2: 19 MMOL/L (ref 20–31)
CREAT SERPL-MCNC: 1.12 MG/DL (ref 0.7–1.2)
GFR AFRICAN AMERICAN: >60 ML/MIN
GFR NON-AFRICAN AMERICAN: >60 ML/MIN
GFR SERPL CREATININE-BSD FRML MDRD: ABNORMAL ML/MIN/{1.73_M2}
GFR SERPL CREATININE-BSD FRML MDRD: ABNORMAL ML/MIN/{1.73_M2}
GLUCOSE BLD-MCNC: 46 MG/DL (ref 70–99)
HCT VFR BLD CALC: 35 % (ref 40.7–50.3)
HEMOGLOBIN: 10.3 G/DL (ref 13–17)
MCH RBC QN AUTO: 25.7 PG (ref 25.2–33.5)
MCHC RBC AUTO-ENTMCNC: 29.4 G/DL (ref 28.4–34.8)
MCV RBC AUTO: 87.3 FL (ref 82.6–102.9)
NRBC AUTOMATED: 0 PER 100 WBC
PDW BLD-RTO: 16.2 % (ref 11.8–14.4)
PLATELET # BLD: 373 K/UL (ref 138–453)
PMV BLD AUTO: 10.3 FL (ref 8.1–13.5)
POTASSIUM SERPL-SCNC: 3.8 MMOL/L (ref 3.7–5.3)
RBC # BLD: 4.01 M/UL (ref 4.21–5.77)
SODIUM BLD-SCNC: 143 MMOL/L (ref 135–144)
WBC # BLD: 7.8 K/UL (ref 3.5–11.3)

## 2021-12-20 PROCEDURE — 36415 COLL VENOUS BLD VENIPUNCTURE: CPT

## 2021-12-20 PROCEDURE — 80048 BASIC METABOLIC PNL TOTAL CA: CPT

## 2021-12-20 PROCEDURE — P9603 ONE-WAY ALLOW PRORATED MILES: HCPCS

## 2021-12-20 PROCEDURE — 85027 COMPLETE CBC AUTOMATED: CPT

## 2021-12-21 ENCOUNTER — HOSPITAL ENCOUNTER (OUTPATIENT)
Age: 66
Setting detail: SPECIMEN
Discharge: HOME | End: 2021-12-21

## 2021-12-31 ENCOUNTER — HOSPITAL ENCOUNTER (OUTPATIENT)
Age: 66
Setting detail: SPECIMEN
Discharge: HOME | End: 2021-12-31

## 2021-12-31 LAB
ABSOLUTE EOS #: 0.33 K/UL (ref 0–0.44)
ABSOLUTE IMMATURE GRANULOCYTE: 0.03 K/UL (ref 0–0.3)
ABSOLUTE LYMPH #: 2.61 K/UL (ref 1.1–3.7)
ABSOLUTE MONO #: 0.87 K/UL (ref 0.1–1.2)
ANION GAP SERPL CALCULATED.3IONS-SCNC: 12 MMOL/L (ref 9–17)
BASOPHILS # BLD: 1 % (ref 0–2)
BASOPHILS ABSOLUTE: 0.1 K/UL (ref 0–0.2)
BUN BLDV-MCNC: 30 MG/DL (ref 8–23)
BUN/CREAT BLD: 26 (ref 9–20)
CALCIUM SERPL-MCNC: 9.2 MG/DL (ref 8.6–10.4)
CHLORIDE BLD-SCNC: 109 MMOL/L (ref 98–107)
CO2: 20 MMOL/L (ref 20–31)
CREAT SERPL-MCNC: 1.15 MG/DL (ref 0.7–1.2)
DIFFERENTIAL TYPE: ABNORMAL
EOSINOPHILS RELATIVE PERCENT: 4 % (ref 1–4)
GFR AFRICAN AMERICAN: >60 ML/MIN
GFR NON-AFRICAN AMERICAN: >60 ML/MIN
GFR SERPL CREATININE-BSD FRML MDRD: ABNORMAL ML/MIN/{1.73_M2}
GFR SERPL CREATININE-BSD FRML MDRD: ABNORMAL ML/MIN/{1.73_M2}
GLUCOSE BLD-MCNC: 92 MG/DL (ref 70–99)
HCT VFR BLD CALC: 36.2 % (ref 40.7–50.3)
HEMOGLOBIN: 10.7 G/DL (ref 13–17)
IMMATURE GRANULOCYTES: 0 %
LYMPHOCYTES # BLD: 28 % (ref 24–43)
MCH RBC QN AUTO: 25.6 PG (ref 25.2–33.5)
MCHC RBC AUTO-ENTMCNC: 29.6 G/DL (ref 28.4–34.8)
MCV RBC AUTO: 86.6 FL (ref 82.6–102.9)
MONOCYTES # BLD: 9 % (ref 3–12)
NRBC AUTOMATED: 0 PER 100 WBC
PDW BLD-RTO: 16 % (ref 11.8–14.4)
PLATELET # BLD: 585 K/UL (ref 138–453)
PLATELET ESTIMATE: ABNORMAL
PMV BLD AUTO: 10.2 FL (ref 8.1–13.5)
POTASSIUM SERPL-SCNC: 4.4 MMOL/L (ref 3.7–5.3)
RBC # BLD: 4.18 M/UL (ref 4.21–5.77)
RBC # BLD: ABNORMAL 10*6/UL
SEG NEUTROPHILS: 58 % (ref 36–65)
SEGMENTED NEUTROPHILS ABSOLUTE COUNT: 5.54 K/UL (ref 1.5–8.1)
SODIUM BLD-SCNC: 141 MMOL/L (ref 135–144)
WBC # BLD: 9.5 K/UL (ref 3.5–11.3)
WBC # BLD: ABNORMAL 10*3/UL

## 2021-12-31 PROCEDURE — 85025 COMPLETE CBC W/AUTO DIFF WBC: CPT

## 2021-12-31 PROCEDURE — P9603 ONE-WAY ALLOW PRORATED MILES: HCPCS

## 2021-12-31 PROCEDURE — 36415 COLL VENOUS BLD VENIPUNCTURE: CPT

## 2021-12-31 PROCEDURE — 80048 BASIC METABOLIC PNL TOTAL CA: CPT

## 2022-01-07 NOTE — PLAN OF CARE
MAC


Patient Condition


Mental Status/LOC:  Same as Preop


Cardiovascular:  Satisfactory


Nausea/Vomiting:  Absent


Respiratory:  Satisfactory


Pain:  Controlled


Complications:  Absent





Post Op Complications


Complications


None





Follow Up Care/Instructions


Patient Instructions


None needed.





Anesthesiology Discharge Order


Discharge Order


Patient is doing well, no complaints, stable vital signs, no apparent adverse 

anesthesia problems.   


No complications reported per nursing.











JOHNNY PEARSON CRNA             Jan 7, 2022 11:29 Problem: Pain:  Goal: Pain level will decrease  Description  Pain level will decrease  2/24/2020 1103 by Ching Willis RN  Outcome: Ongoing  2/24/2020 0418 by Ofe Lizarraga RN  Outcome: Ongoing  Note:   Pt stated some relief from prn pain meds given    Goal: Control of acute pain  Description  Control of acute pain  Outcome: Ongoing  Goal: Control of chronic pain  Description  Control of chronic pain  Outcome: Ongoing     Problem: Discharge Planning:  Goal: Discharged to appropriate level of care  Description  Discharged to appropriate level of care  2/24/2020 1103 by Ching Willis RN  Outcome: Ongoing  2/24/2020 0418 by Ofe Lizarraga RN  Outcome: Ongoing     Problem:  Activity Intolerance:  Goal: Ability to tolerate increased activity will improve  Description  Ability to tolerate increased activity will improve  2/24/2020 1103 by Ching Willis RN  Outcome: Ongoing  2/24/2020 0418 by Ofe Lizarraga RN  Outcome: Ongoing     Problem: Airway Clearance - Ineffective:  Goal: Ability to maintain a clear airway will improve  Description  Ability to maintain a clear airway will improve  2/24/2020 1103 by Ching Willis RN  Outcome: Ongoing  2/24/2020 0418 by Ofe Lizarraga RN  Outcome: Ongoing  Note:   No respiratory distress noted     Problem: Breathing Pattern - Ineffective:  Goal: Ability to achieve and maintain a regular respiratory rate will improve  Description  Ability to achieve and maintain a regular respiratory rate will improve  2/24/2020 1103 by Ching Willis RN  Outcome: Ongoing  2/24/2020 0418 by Ofe Lizarraga RN  Outcome: Ongoing     Problem: Gas Exchange - Impaired:  Goal: Levels of oxygenation will improve  Description  Levels of oxygenation will improve  2/24/2020 1103 by Ching Willis RN  Outcome: Ongoing  2/24/2020 0418 by Ofe Lizarraga RN  Note:   Sats in mid 90's on room air     Problem: Falls - Risk of:  Goal: Will remain free from falls  Description  Will remain free from falls  2/24/2020 1103 by Yair Adhikari RN  Outcome: Ongoing  2/24/2020 0418 by Mariely Carney RN  Outcome: Ongoing  Note:   Bed alarm in use, call bell in reach -- encouraged to call prn    Goal: Absence of physical injury  Description  Absence of physical injury  Outcome: Ongoing     Problem: Skin Integrity:  Goal: Will show no infection signs and symptoms  Description  Will show no infection signs and symptoms  2/24/2020 1103 by Yair Adhikari RN  Outcome: Ongoing  2/24/2020 0418 by Mariely Carney RN  Outcome: Ongoing  Goal: Absence of new skin breakdown  Description  Absence of new skin breakdown  Outcome: Ongoing     Problem: Risk for Impaired Skin Integrity  Goal: Tissue integrity - skin and mucous membranes  Description  Structural intactness and normal physiological function of skin and  mucous membranes.   Outcome: Ongoing

## 2022-01-08 NOTE — DISCHARGE SUMMARY
[facility]  Office: [address], DO, Hu Davisy, DO, Jad Base, DO, Enio Her Blood, DO, Danelle Cordero MD, Patricio Mccoy MD, Samy Gonzalez MD, Mami Lazaro MD, Adam Uribe MD, Karin Guadarrama MD, Sridevi Mosley MD, Akilah Choi, DO, Ankit Izaguirre, DO, Hortensia Zimmer MD,  Andreas Green, DO, Khai Duggan MD, Reyes Edwards MD, Monique Lopez MD, Cornelia Yi MD, Bianca Conde MD, Della Gutierrez MD, Osmin Bertrand MD, Raissa Berrios, [facility], [facility], [facility], Ezequiel Dietz, CNP, Roberto Lassiter, [facility], Everardo Callaway, CNP, Jyoti Serna, CNP, Baron Lesch, CNP, Bora Cunningham, CNP, Leah Stone, CNP, Iraida Oneill PA-C, Denver Parks, DNP, Delvis Montague, DNP, Mya Salgado, CNP, Royce Beasley, CNP, Della Chanel, CNP, Herman Romeo, CNP, Patricia Cristina, CNP, Shai Lewis, [address] [place] [address]    Discharge Summary    Patient ID: Lucille Marie  :  1955   MRN: [id number]     ACCOUNT:  [de-identified]   Patient's PCP: MARCELO Segura CNP  Admit Date: 2021   Discharge Date: 2021    Discharge Physician: Nina Pruitt DO     The patient was seen and examined on day of discharge and this discharge summary is in conjunction with any daily progress note from day of discharge.     Active Discharge Diagnoses:     Primary Problem  Hyperkalemia      Hospital Problems  Active Hospital Problems    Diagnosis Date Noted    Hyperkalemia [E87.5] 2021    Cellulitis of left lower extremity [L03.116]     Essential hypertension [I10]     S/P BKA (below knee amputation) bilateral (HCC) [J03.921, Z89.511]     COPD without exacerbation (HCC) [J44.9]     [facility] [E11.42]     Type 2 diabetes mellitus with diabetic polyneuropathy, with long-term current use of insulin ([facility] [E11.42, Z79.4]          Hospital Course:     Brief History  As documented in the medical record:  \"Nicolas Cardenas is a 42-year-old  gentleman who presented to [facility] on 11/25/2021 for evaluation of hyperkalemia.  Patient recently had a prolonged hospitalization and was recently discharged on 11/13/2021 after infection of his left BKA stump.  He was discharged at that time on ceftaroline.  Please been residing at a skilled nursing facility since then. Eli Kennedy studies were drawn in the outpatient setting he was noted to be hyperkalemic. [facility] was given Lokelma, but repeat labs revealed persistent hyperkalemia.  He was admitted to the hospital for further evaluation. [facility] was given insulin, dextrose and calcium in the emergency department.  Potassium is improved.  Infectious diseases been consulted. Juan F Barth will be admitted for further work-up and treatment of hyperkalemia. \"     The plan included:  1. \"Awaiting disposition on discharge to [facility] versus home and transfer to [facility]. 2. Continue vancomycin and Rocephin through 12/8  3. Continue current dose of Lortab  4. Continue continue local wound care  5. Continue Lyrica for phantom pains  6. Continue insulin sliding scale and Lantus and Metformin for type 2 diabetes mellitus  7. Change Lac-Hydrin to hydrocortisone 1% cream.  Give Benadryl and Tylenol.   8. Remains medically stable for discharge\"     The patient's condition was stabilized  Discharge planning initiated    Discharge plan:     Check stool lactoferrin  Correct electrolyte abnormalities  Wound care  Vascular f/u Dr Wei Joshua    Pain management  Antibiotics per Infectious Disease service  Glycemic contol - monitor and control blood sugars  Blood Pressure - Monitor and control   PT/OT  DVT prophylaxis   Awaiting placement  Discharge planning  Will discharge when arrangements complete and ok with other services. Follow-up with PCP in one week, MARCELO Elizondo CNP  Notify PCP of discharge   Med Rec done  LOREE done   DCP 35 min+    No discharge procedures on file. Significant Diagnostic Studies:     Labs / Micro:  Labs:  Hematology:No results for input(s): WBC, RBC, HGB, HCT, MCV, MCH, MCHC, RDW, PLT, MPV, SEDRATE, CRP, INR, DDIMER, MJ9IHRSH, LABABSO in the last 72 hours. Invalid input(s): PT  Chemistry:No results for input(s): NA, K, CL, CO2, GLUCOSE, BUN, CREATININE, MG, ANIONGAP, LABGLOM, GFRAA, CALCIUM, CAION, PHOS, PSA, PROBNP, TROPHS, CKTOTAL, CKMB, CKMBINDEX, MYOGLOBIN, DIGOXIN, LACTACIDWB in the last 72 hours. No results for input(s): PROT, LABALBU, LABA1C, S2FNXBB, C6WIEIY, FT4, TSH, AST, ALT, LDH, GGT, ALKPHOS, LABGGT, BILITOT, BILIDIR, AMMONIA, AMYLASE, LIPASE, LACTATE, CHOL, HDL, LDLCHOLESTEROL, CHOLHDLRATIO, TRIG, VLDL, WMS16QM, PHENYTOIN, PHENYF, URICACID, POCGLU in the last 72 hours. Radiology:    No results found.       Consultations:    Consults:     Final Specialist Recommendations/Findings:   IP CONSULT TO INFECTIOUS DISEASES  PHARMACY TO DOSE VANCOMYCIN        Discharged Condition:    Stable     Disposition: [facility]    Physician Follow Up:   MARCELO Elizondo CNP  [address]  [phone]    Schedule an appointment as soon as possible for a visit in 1 week  for follow up after hospitalization    Rose Acevedo MD  [facility]  [address] [facility] [address]    Schedule an appointment as soon as possible for a visit in 2 weeks  For follow up after hospitalization with the infectious disease doc       Activity:  activity as tolerated  Surgical restrictions    Diet:  diabetic diet     Discharge Medications:      Medication List      CHANGE how you take these medications    metFORMIN 500 MG tablet  Commonly known as: GLUCOPHAGE  Take 1 tablet by mouth 2 times daily (with meals)  What changed: how much to take     tamsulosin 0.4 MG capsule  Commonly known as: FLOMAX  Take 1 capsule by mouth daily  What changed: when to take this        CONTINUE taking these medications    aluminum & magnesium hydroxide-simethicone 200-200-20 MG/5ML Susp suspension  Commonly known as: MAALOX     amitriptyline 75 MG tablet  Commonly known as: ELAVIL  Take 1 tablet by mouth nightly     apixaban 5 MG Tabs tablet  Commonly known as: Eliquis  Take 1 tablet by mouth 2 times daily     aspirin EC 81 MG EC tablet     atorvastatin 10 MG tablet  Commonly known as: LIPITOR     bisacodyl 5 MG EC tablet  Commonly known as: DULCOLAX  Take 1 tablet by mouth daily as needed for Constipation     budesonide-formoterol 160-4.5 MCG/ACT Aero  Commonly known as: SYMBICORT  Inhale 2 puffs into the lungs 2 times daily     docusate sodium 100 MG capsule  Commonly known as: Colace  Take 1 capsule by mouth 2 times daily     famotidine 20 MG tablet  Commonly known as: PEPCID  Take 1 tablet by mouth 2 times daily     ferrous sulfate 325 (65 Fe) MG tablet  Commonly known as: IRON 325  Take 1 tablet by mouth 2 times daily     glucose monitoring kit  1 kit by Does not apply route daily     hydrOXYzine 25 MG capsule  Commonly known as: VISTARIL  Take 1 capsule by mouth 3 times daily as needed for Anxiety     insulin lispro 100 UNIT/ML injection vial  Commonly known as: HUMALOG  Inject 0-12 Units into the skin 3 times daily (with meals)     Januvia 100 MG tablet  Generic drug: SITagliptin     lactobacillus Tabs  Take 1 tablet by mouth 2 times daily     Lancets Misc  1 each by Does not apply route 3 times daily     Levemir 100 UNIT/ML injection vial  Generic drug: insulin detemir  Inject 25 units, subcutaenously daily with diner     metoprolol tartrate 25 MG tablet  Commonly known as: LOPRESSOR  Take 1 tablet by mouth 2 times daily Hold for heart rate less than 60 or systolic blood pressure less than 100     naloxegol 25 MG Tabs tablet  Commonly known as: MOVANTIK  Take 1 tablet by mouth every morning     pregabalin 75 MG capsule  Commonly known as: LYRICA     simethicone 80 MG chewable tablet  Commonly known as: MYLICON  Take 1 tablet by mouth every 6 hours as needed for Flatulence     therapeutic multivitamin-minerals tablet     Ventolin  (90 Base) MCG/ACT inhaler  Generic drug: albuterol sulfate HFA        STOP taking these medications    ceftaroline fosamil 600 MG Solr  Commonly known as: TEFLARO        ASK your doctor about these medications    cefTRIAXone  infusion  Commonly known as: ROCEPHIN  Infuse 2,000 mg intravenously every 24 hours for 12 days Compound per protocol  Ask about: Should I take this medication?     sodium zirconium cyclosilicate 5 g Pack oral suspension  Commonly known as: Lokelma  Take 5 g by mouth 3 times daily for 2 days  Ask about: Should I take this medication?     vancomycin  infusion  Commonly known as: VANCOCIN  Infuse 750 mg intravenously every 12 hours for 12 days Compound per protocol. Ask about: Should I take this medication? Where to Get Your Medications      Information about where to get these medications is not yet available    Ask your nurse or doctor about these medications  · cefTRIAXone  infusion  · sodium zirconium cyclosilicate 5 g Pack oral suspension  · vancomycin  infusion         Time Spent on discharge is  35 mins in patient examination, evaluation, counseling, medication reconciliation, discharge plan and follow up. Electronically signed by   Nina Pruitt DO  1/8/2022  8:46 AM      Thank you Dr. Ebony Leiva, APRN - CNP for the opportunity to be involved in this patient's care.
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
## 2022-02-05 ENCOUNTER — HOSPITAL ENCOUNTER (EMERGENCY)
Facility: CLINIC | Age: 67
Discharge: HOME | End: 2022-02-05
Attending: EMERGENCY MEDICINE
Payer: MEDICARE

## 2022-02-05 ENCOUNTER — APPOINTMENT (OUTPATIENT)
Dept: GENERAL RADIOLOGY | Facility: CLINIC | Age: 67
End: 2022-02-05
Payer: MEDICARE

## 2022-02-05 VITALS
RESPIRATION RATE: 17 BRPM | BODY MASS INDEX: 17.15 KG/M2 | HEART RATE: 92 BPM | WEIGHT: 130 LBS | DIASTOLIC BLOOD PRESSURE: 72 MMHG | OXYGEN SATURATION: 96 % | TEMPERATURE: 98.5 F | SYSTOLIC BLOOD PRESSURE: 144 MMHG

## 2022-02-05 DIAGNOSIS — R23.8 SKIN BREAKDOWN: Primary | ICD-10-CM

## 2022-02-05 PROCEDURE — 99285 EMERGENCY DEPT VISIT HI MDM: CPT

## 2022-02-05 PROCEDURE — 71045 X-RAY EXAM CHEST 1 VIEW: CPT

## 2022-02-05 PROCEDURE — 6370000000 HC RX 637 (ALT 250 FOR IP): Performed by: EMERGENCY MEDICINE

## 2022-02-05 RX ORDER — CEPHALEXIN 250 MG/1
500 CAPSULE ORAL ONCE
Status: COMPLETED | OUTPATIENT
Start: 2022-02-05 | End: 2022-02-05

## 2022-02-05 RX ORDER — IBUPROFEN 800 MG/1
800 TABLET ORAL ONCE
Status: COMPLETED | OUTPATIENT
Start: 2022-02-05 | End: 2022-02-05

## 2022-02-05 RX ORDER — CEPHALEXIN 500 MG/1
500 CAPSULE ORAL 4 TIMES DAILY
Qty: 28 CAPSULE | Refills: 0 | Status: SHIPPED | OUTPATIENT
Start: 2022-02-05 | End: 2022-02-12

## 2022-02-05 RX ADMIN — IBUPROFEN 800 MG: 800 TABLET, FILM COATED ORAL at 04:17

## 2022-02-05 RX ADMIN — CEPHALEXIN 500 MG: 250 CAPSULE ORAL at 04:17

## 2022-02-05 ASSESSMENT — PAIN SCALES - GENERAL
PAINLEVEL_OUTOF10: 10
PAINLEVEL_OUTOF10: 10
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
## 2022-02-05 NOTE — ED NOTES
Mode of arrival (squad #, walk in, police, etc) : [facility]        Chief complaint(s): wounds and leg pain        Arrival Note (brief scenario, treatment PTA, etc). : Pt arrives to ED with c/o pain to his left stump as well as c/o wounds to his bilateral stumps. Pt reports that wound care is suppose to be out to his house frequently but pt reports that they have not been out to see him. Pt is reporting that he has been havig issues sleeping as well. Pt is requesting something strong for pain, pt is also requesting something to help him sleep. C= \"Have you ever felt that you should Cut down on your drinking? \"  No  A= \"Have people Annoyed you by criticizing your drinking? \"  No  G= \"Have you ever felt bad or Guilty about your drinking? \"  No  E= \"Have you ever had a drink as an Eye-opener first thing in the morning to steady your nerves or to help a hangover? \"  No      Deferred []      Reason for deferring: N/A    *If yes to two or more: probable alcohol abuse. Judy Harrison RN  02/05/22 0215

## 2022-02-10 ENCOUNTER — TELEPHONE (OUTPATIENT)
Dept: FAMILY MEDICINE CLINIC | Age: 67
End: 2022-02-10

## 2022-02-10 DIAGNOSIS — Z78.9 POOR HISTORIAN: ICD-10-CM

## 2022-02-10 DIAGNOSIS — Z91.14 HX OF MEDICATION NONCOMPLIANCE: ICD-10-CM

## 2022-02-10 DIAGNOSIS — Z89.512 S/P BKA (BELOW KNEE AMPUTATION) BILATERAL (HCC): ICD-10-CM

## 2022-02-10 DIAGNOSIS — E11.42 DM TYPE 2 WITH DIABETIC PERIPHERAL NEUROPATHY (HCC): Primary | ICD-10-CM

## 2022-02-10 DIAGNOSIS — Z89.511 S/P BKA (BELOW KNEE AMPUTATION) BILATERAL (HCC): ICD-10-CM

## 2022-02-10 NOTE — TELEPHONE ENCOUNTER
Patient called with his daughter on the line saying that he needs a new home health referral. He says that he wants them today. It was very hard to communicate with the patient because he was getting side tracked with other things around him.

## 2022-02-19 ENCOUNTER — APPOINTMENT (OUTPATIENT)
Dept: CT IMAGING | Age: 67
DRG: 896 | End: 2022-02-19
Payer: MEDICARE

## 2022-02-19 ENCOUNTER — APPOINTMENT (OUTPATIENT)
Dept: GENERAL RADIOLOGY | Age: 67
DRG: 896 | End: 2022-02-19
Payer: MEDICARE

## 2022-02-19 ENCOUNTER — HOSPITAL ENCOUNTER (INPATIENT)
Age: 67
LOS: 7 days | Discharge: NURSING, REHAB OR LONG-TERM CARE | DRG: 896 | End: 2022-02-26
Attending: EMERGENCY MEDICINE | Admitting: INTERNAL MEDICINE
Payer: MEDICARE

## 2022-02-19 DIAGNOSIS — G93.40 ACUTE ENCEPHALOPATHY: Primary | ICD-10-CM

## 2022-02-19 DIAGNOSIS — R73.9 HYPERGLYCEMIA: ICD-10-CM

## 2022-02-19 DIAGNOSIS — F10.920 ACUTE ALCOHOLIC INTOXICATION WITHOUT COMPLICATION (HCC): ICD-10-CM

## 2022-02-19 LAB
ABSOLUTE EOS #: 0.08 K/UL (ref 0–0.44)
ABSOLUTE IMMATURE GRANULOCYTE: 0.06 K/UL (ref 0–0.3)
ABSOLUTE LYMPH #: 2.36 K/UL (ref 1.1–3.7)
ABSOLUTE MONO #: 0.49 K/UL (ref 0.1–1.2)
ACETAMINOPHEN LEVEL: <10 UG/ML (ref 10–30)
ALBUMIN SERPL-MCNC: 3.2 G/DL (ref 3.5–5.2)
ALBUMIN/GLOBULIN RATIO: ABNORMAL (ref 1–2.5)
ALLEN TEST: ABNORMAL
ALP BLD-CCNC: 133 U/L (ref 40–129)
ALT SERPL-CCNC: 9 U/L (ref 5–41)
AMMONIA: 27 UMOL/L (ref 16–60)
AMPHETAMINE SCREEN URINE: NEGATIVE
ANION GAP SERPL CALCULATED.3IONS-SCNC: 19 MMOL/L (ref 9–17)
AST SERPL-CCNC: 10 U/L
BARBITURATE SCREEN URINE: NEGATIVE
BASOPHILS # BLD: 1 % (ref 0–2)
BASOPHILS ABSOLUTE: 0.07 K/UL (ref 0–0.2)
BENZODIAZEPINE SCREEN, URINE: NEGATIVE
BETA-HYDROXYBUTYRATE: 0.29 MMOL/L (ref 0.02–0.27)
BILIRUB SERPL-MCNC: 0.12 MG/DL (ref 0.3–1.2)
BNP INTERPRETATION: ABNORMAL
BUN BLDV-MCNC: 16 MG/DL (ref 8–23)
BUN/CREAT BLD: 16 (ref 9–20)
BUPRENORPHINE URINE: ABNORMAL
CALCIUM SERPL-MCNC: 8.8 MG/DL (ref 8.6–10.4)
CANNABINOID SCREEN URINE: POSITIVE
CHLORIDE BLD-SCNC: 92 MMOL/L (ref 98–107)
CHP ED QC CHECK: NORMAL
CO2: 14 MMOL/L (ref 20–31)
COCAINE METABOLITE, URINE: NEGATIVE
CREAT SERPL-MCNC: 0.98 MG/DL (ref 0.7–1.2)
DIFFERENTIAL TYPE: ABNORMAL
EOSINOPHILS RELATIVE PERCENT: 1 % (ref 1–4)
ETHANOL PERCENT: 0.23 %
ETHANOL: 226 MG/DL
FIO2: ABNORMAL
GFR AFRICAN AMERICAN: >60 ML/MIN
GFR NON-AFRICAN AMERICAN: >60 ML/MIN
GFR SERPL CREATININE-BSD FRML MDRD: ABNORMAL ML/MIN/{1.73_M2}
GFR SERPL CREATININE-BSD FRML MDRD: ABNORMAL ML/MIN/{1.73_M2}
GLUCOSE BLD-MCNC: 317 MG/DL
GLUCOSE BLD-MCNC: 317 MG/DL (ref 75–110)
GLUCOSE BLD-MCNC: 343 MG/DL (ref 70–99)
HCO3 VENOUS: 15.7 MMOL/L (ref 22–29)
HCT VFR BLD CALC: 37.3 % (ref 40.7–50.3)
HEMOGLOBIN: 11.6 G/DL (ref 13–17)
IMMATURE GRANULOCYTES: 1 %
LACTIC ACID, WHOLE BLOOD: ABNORMAL MMOL/L (ref 0.7–2.1)
LACTIC ACID: 3.6 MMOL/L (ref 0.5–2.2)
LIPASE: 41 U/L (ref 13–60)
LYMPHOCYTES # BLD: 20 % (ref 24–43)
MAGNESIUM: 1.8 MG/DL (ref 1.6–2.6)
MCH RBC QN AUTO: 27.6 PG (ref 25.2–33.5)
MCHC RBC AUTO-ENTMCNC: 31.1 G/DL (ref 28.4–34.8)
MCV RBC AUTO: 88.6 FL (ref 82.6–102.9)
MDMA URINE: ABNORMAL
METHADONE SCREEN, URINE: NEGATIVE
METHAMPHETAMINE, URINE: ABNORMAL
MODE: ABNORMAL
MONOCYTES # BLD: 4 % (ref 3–12)
NEGATIVE BASE EXCESS, VEN: 9 (ref 0–2)
NRBC AUTOMATED: 0 PER 100 WBC
O2 DEVICE/FLOW/%: ABNORMAL
O2 SAT, VEN: 99 % (ref 60–85)
OPIATES, URINE: NEGATIVE
OXYCODONE SCREEN URINE: NEGATIVE
PATIENT TEMP: ABNORMAL
PCO2, VEN: 30.6 MM HG (ref 41–51)
PDW BLD-RTO: 15.4 % (ref 11.8–14.4)
PH VENOUS: 7.32 (ref 7.32–7.43)
PHENCYCLIDINE, URINE: NEGATIVE
PLATELET # BLD: 375 K/UL (ref 138–453)
PLATELET ESTIMATE: ABNORMAL
PMV BLD AUTO: 9.8 FL (ref 8.1–13.5)
PO2, VEN: 134.3 MM HG (ref 30–50)
POC PCO2 TEMP: ABNORMAL MM HG
POC PH TEMP: ABNORMAL
POC PO2 TEMP: ABNORMAL MM HG
POSITIVE BASE EXCESS, VEN: ABNORMAL (ref 0–3)
POTASSIUM SERPL-SCNC: 3.5 MMOL/L (ref 3.7–5.3)
PRO-BNP: 335 PG/ML
PROPOXYPHENE, URINE: ABNORMAL
RBC # BLD: 4.21 M/UL (ref 4.21–5.77)
RBC # BLD: ABNORMAL 10*6/UL
SALICYLATE LEVEL: <1 MG/DL (ref 3–10)
SAMPLE SITE: ABNORMAL
SEG NEUTROPHILS: 73 % (ref 36–65)
SEGMENTED NEUTROPHILS ABSOLUTE COUNT: 8.9 K/UL (ref 1.5–8.1)
SERUM OSMOLALITY: 342 MOSM/KG (ref 282–298)
SODIUM BLD-SCNC: 125 MMOL/L (ref 135–144)
TEST INFORMATION: ABNORMAL
TOTAL CK: 31 U/L (ref 39–308)
TOTAL CO2, VENOUS: ABNORMAL MMOL/L (ref 23–30)
TOTAL PROTEIN: 6.7 G/DL (ref 6.4–8.3)
TRICYCLIC ANTIDEPRESSANTS, UR: ABNORMAL
TROPONIN INTERP: ABNORMAL
TROPONIN T: ABNORMAL NG/ML
TROPONIN, HIGH SENSITIVITY: 24 NG/L (ref 0–22)
WBC # BLD: 12 K/UL (ref 3.5–11.3)
WBC # BLD: ABNORMAL 10*3/UL

## 2022-02-19 PROCEDURE — 93005 ELECTROCARDIOGRAM TRACING: CPT | Performed by: EMERGENCY MEDICINE

## 2022-02-19 PROCEDURE — 80053 COMPREHEN METABOLIC PANEL: CPT

## 2022-02-19 PROCEDURE — 82803 BLOOD GASES ANY COMBINATION: CPT

## 2022-02-19 PROCEDURE — 83605 ASSAY OF LACTIC ACID: CPT

## 2022-02-19 PROCEDURE — 2580000003 HC RX 258: Performed by: EMERGENCY MEDICINE

## 2022-02-19 PROCEDURE — 80143 DRUG ASSAY ACETAMINOPHEN: CPT

## 2022-02-19 PROCEDURE — 99222 1ST HOSP IP/OBS MODERATE 55: CPT | Performed by: NURSE PRACTITIONER

## 2022-02-19 PROCEDURE — 83690 ASSAY OF LIPASE: CPT

## 2022-02-19 PROCEDURE — 82550 ASSAY OF CK (CPK): CPT

## 2022-02-19 PROCEDURE — 82947 ASSAY GLUCOSE BLOOD QUANT: CPT

## 2022-02-19 PROCEDURE — 96374 THER/PROPH/DIAG INJ IV PUSH: CPT

## 2022-02-19 PROCEDURE — 96361 HYDRATE IV INFUSION ADD-ON: CPT

## 2022-02-19 PROCEDURE — 83930 ASSAY OF BLOOD OSMOLALITY: CPT

## 2022-02-19 PROCEDURE — 82140 ASSAY OF AMMONIA: CPT

## 2022-02-19 PROCEDURE — 72125 CT NECK SPINE W/O DYE: CPT

## 2022-02-19 PROCEDURE — 6360000002 HC RX W HCPCS: Performed by: EMERGENCY MEDICINE

## 2022-02-19 PROCEDURE — 85025 COMPLETE CBC W/AUTO DIFF WBC: CPT

## 2022-02-19 PROCEDURE — 71045 X-RAY EXAM CHEST 1 VIEW: CPT

## 2022-02-19 PROCEDURE — 96372 THER/PROPH/DIAG INJ SC/IM: CPT

## 2022-02-19 PROCEDURE — 80307 DRUG TEST PRSMV CHEM ANLYZR: CPT

## 2022-02-19 PROCEDURE — 6370000000 HC RX 637 (ALT 250 FOR IP): Performed by: EMERGENCY MEDICINE

## 2022-02-19 PROCEDURE — 82010 KETONE BODYS QUAN: CPT

## 2022-02-19 PROCEDURE — 99284 EMERGENCY DEPT VISIT MOD MDM: CPT

## 2022-02-19 PROCEDURE — 2060000000 HC ICU INTERMEDIATE R&B

## 2022-02-19 PROCEDURE — 70450 CT HEAD/BRAIN W/O DYE: CPT

## 2022-02-19 PROCEDURE — 36415 COLL VENOUS BLD VENIPUNCTURE: CPT

## 2022-02-19 PROCEDURE — 83880 ASSAY OF NATRIURETIC PEPTIDE: CPT

## 2022-02-19 PROCEDURE — 84484 ASSAY OF TROPONIN QUANT: CPT

## 2022-02-19 PROCEDURE — G0480 DRUG TEST DEF 1-7 CLASSES: HCPCS

## 2022-02-19 PROCEDURE — 80179 DRUG ASSAY SALICYLATE: CPT

## 2022-02-19 PROCEDURE — 83735 ASSAY OF MAGNESIUM: CPT

## 2022-02-19 RX ORDER — LORAZEPAM 2 MG/ML
3 INJECTION INTRAMUSCULAR
Status: DISCONTINUED | OUTPATIENT
Start: 2022-02-19 | End: 2022-02-23

## 2022-02-19 RX ORDER — NALOXONE HYDROCHLORIDE 0.4 MG/ML
0.4 INJECTION, SOLUTION INTRAMUSCULAR; INTRAVENOUS; SUBCUTANEOUS ONCE
Status: COMPLETED | OUTPATIENT
Start: 2022-02-19 | End: 2022-02-19

## 2022-02-19 RX ORDER — DEXTROSE MONOHYDRATE 25 G/50ML
12.5 INJECTION, SOLUTION INTRAVENOUS PRN
Status: DISCONTINUED | OUTPATIENT
Start: 2022-02-19 | End: 2022-02-20 | Stop reason: RX

## 2022-02-19 RX ORDER — LORAZEPAM 1 MG/1
1 TABLET ORAL
Status: DISCONTINUED | OUTPATIENT
Start: 2022-02-19 | End: 2022-02-23

## 2022-02-19 RX ORDER — INSULIN GLARGINE 100 [IU]/ML
25 INJECTION, SOLUTION SUBCUTANEOUS NIGHTLY
Status: DISCONTINUED | OUTPATIENT
Start: 2022-02-20 | End: 2022-02-24

## 2022-02-19 RX ORDER — M-VIT,TX,IRON,MINS/CALC/FOLIC 27MG-0.4MG
1 TABLET ORAL DAILY
Status: DISCONTINUED | OUTPATIENT
Start: 2022-02-20 | End: 2022-02-26 | Stop reason: HOSPADM

## 2022-02-19 RX ORDER — PREGABALIN 75 MG/1
75 CAPSULE ORAL 3 TIMES DAILY
Status: DISCONTINUED | OUTPATIENT
Start: 2022-02-20 | End: 2022-02-23

## 2022-02-19 RX ORDER — POLYETHYLENE GLYCOL 3350 17 G/17G
17 POWDER, FOR SOLUTION ORAL DAILY PRN
Status: DISCONTINUED | OUTPATIENT
Start: 2022-02-19 | End: 2022-02-26 | Stop reason: HOSPADM

## 2022-02-19 RX ORDER — FAMOTIDINE 20 MG/1
20 TABLET, FILM COATED ORAL 2 TIMES DAILY
Status: DISCONTINUED | OUTPATIENT
Start: 2022-02-20 | End: 2022-02-23

## 2022-02-19 RX ORDER — ASPIRIN 81 MG/1
81 TABLET ORAL DAILY
Status: DISCONTINUED | OUTPATIENT
Start: 2022-02-20 | End: 2022-02-26 | Stop reason: HOSPADM

## 2022-02-19 RX ORDER — SODIUM CHLORIDE 0.9 % (FLUSH) 0.9 %
5-40 SYRINGE (ML) INJECTION EVERY 12 HOURS SCHEDULED
Status: DISCONTINUED | OUTPATIENT
Start: 2022-02-20 | End: 2022-02-26 | Stop reason: HOSPADM

## 2022-02-19 RX ORDER — SODIUM CHLORIDE 9 MG/ML
INJECTION, SOLUTION INTRAVENOUS CONTINUOUS
Status: DISCONTINUED | OUTPATIENT
Start: 2022-02-20 | End: 2022-02-20

## 2022-02-19 RX ORDER — ONDANSETRON 4 MG/1
4 TABLET, ORALLY DISINTEGRATING ORAL EVERY 8 HOURS PRN
Status: DISCONTINUED | OUTPATIENT
Start: 2022-02-19 | End: 2022-02-26 | Stop reason: HOSPADM

## 2022-02-19 RX ORDER — AMITRIPTYLINE HYDROCHLORIDE 25 MG/1
75 TABLET, FILM COATED ORAL NIGHTLY
Status: DISCONTINUED | OUTPATIENT
Start: 2022-02-20 | End: 2022-02-21

## 2022-02-19 RX ORDER — ACETAMINOPHEN 650 MG/1
650 SUPPOSITORY RECTAL EVERY 6 HOURS PRN
Status: DISCONTINUED | OUTPATIENT
Start: 2022-02-19 | End: 2022-02-26 | Stop reason: HOSPADM

## 2022-02-19 RX ORDER — MAGNESIUM SULFATE 1 G/100ML
1000 INJECTION INTRAVENOUS PRN
Status: DISCONTINUED | OUTPATIENT
Start: 2022-02-19 | End: 2022-02-26 | Stop reason: HOSPADM

## 2022-02-19 RX ORDER — MULTIVITAMIN WITH IRON
1 TABLET ORAL DAILY
Status: DISCONTINUED | OUTPATIENT
Start: 2022-02-20 | End: 2022-02-20 | Stop reason: SDUPTHER

## 2022-02-19 RX ORDER — ONDANSETRON 2 MG/ML
4 INJECTION INTRAMUSCULAR; INTRAVENOUS EVERY 6 HOURS PRN
Status: DISCONTINUED | OUTPATIENT
Start: 2022-02-19 | End: 2022-02-26 | Stop reason: HOSPADM

## 2022-02-19 RX ORDER — SODIUM CHLORIDE 0.9 % (FLUSH) 0.9 %
10 SYRINGE (ML) INJECTION PRN
Status: DISCONTINUED | OUTPATIENT
Start: 2022-02-19 | End: 2022-02-26 | Stop reason: HOSPADM

## 2022-02-19 RX ORDER — DOCUSATE SODIUM 100 MG/1
100 CAPSULE, LIQUID FILLED ORAL 2 TIMES DAILY
Status: DISCONTINUED | OUTPATIENT
Start: 2022-02-20 | End: 2022-02-26 | Stop reason: HOSPADM

## 2022-02-19 RX ORDER — POTASSIUM CHLORIDE 20 MEQ/1
40 TABLET, EXTENDED RELEASE ORAL PRN
Status: DISCONTINUED | OUTPATIENT
Start: 2022-02-19 | End: 2022-02-26 | Stop reason: HOSPADM

## 2022-02-19 RX ORDER — DEXTROSE MONOHYDRATE 50 MG/ML
100 INJECTION, SOLUTION INTRAVENOUS PRN
Status: DISCONTINUED | OUTPATIENT
Start: 2022-02-19 | End: 2022-02-26 | Stop reason: HOSPADM

## 2022-02-19 RX ORDER — TAMSULOSIN HYDROCHLORIDE 0.4 MG/1
0.4 CAPSULE ORAL NIGHTLY
Status: DISCONTINUED | OUTPATIENT
Start: 2022-02-20 | End: 2022-02-26 | Stop reason: HOSPADM

## 2022-02-19 RX ORDER — 0.9 % SODIUM CHLORIDE 0.9 %
1000 INTRAVENOUS SOLUTION INTRAVENOUS ONCE
Status: COMPLETED | OUTPATIENT
Start: 2022-02-19 | End: 2022-02-20

## 2022-02-19 RX ORDER — LORAZEPAM 2 MG/ML
2 INJECTION INTRAMUSCULAR
Status: DISCONTINUED | OUTPATIENT
Start: 2022-02-19 | End: 2022-02-23

## 2022-02-19 RX ORDER — POTASSIUM CHLORIDE 7.45 MG/ML
10 INJECTION INTRAVENOUS PRN
Status: DISCONTINUED | OUTPATIENT
Start: 2022-02-19 | End: 2022-02-26 | Stop reason: HOSPADM

## 2022-02-19 RX ORDER — HYDROXYZINE PAMOATE 25 MG/1
25 CAPSULE ORAL 3 TIMES DAILY PRN
Status: DISCONTINUED | OUTPATIENT
Start: 2022-02-19 | End: 2022-02-20 | Stop reason: CLARIF

## 2022-02-19 RX ORDER — SODIUM CHLORIDE 9 MG/ML
25 INJECTION, SOLUTION INTRAVENOUS PRN
Status: DISCONTINUED | OUTPATIENT
Start: 2022-02-19 | End: 2022-02-26 | Stop reason: HOSPADM

## 2022-02-19 RX ORDER — ALBUTEROL SULFATE 2.5 MG/3ML
2.5 SOLUTION RESPIRATORY (INHALATION)
Status: DISCONTINUED | OUTPATIENT
Start: 2022-02-19 | End: 2022-02-20

## 2022-02-19 RX ORDER — SIMETHICONE 80 MG
80 TABLET,CHEWABLE ORAL EVERY 6 HOURS PRN
Status: DISCONTINUED | OUTPATIENT
Start: 2022-02-19 | End: 2022-02-26 | Stop reason: HOSPADM

## 2022-02-19 RX ORDER — LORAZEPAM 1 MG/1
4 TABLET ORAL
Status: DISCONTINUED | OUTPATIENT
Start: 2022-02-19 | End: 2022-02-23

## 2022-02-19 RX ORDER — ACETAMINOPHEN 325 MG/1
650 TABLET ORAL EVERY 6 HOURS PRN
Status: DISCONTINUED | OUTPATIENT
Start: 2022-02-19 | End: 2022-02-26 | Stop reason: HOSPADM

## 2022-02-19 RX ORDER — LORAZEPAM 1 MG/1
2 TABLET ORAL
Status: DISCONTINUED | OUTPATIENT
Start: 2022-02-19 | End: 2022-02-23

## 2022-02-19 RX ORDER — LORAZEPAM 2 MG/ML
4 INJECTION INTRAMUSCULAR
Status: DISCONTINUED | OUTPATIENT
Start: 2022-02-19 | End: 2022-02-23

## 2022-02-19 RX ORDER — NICOTINE POLACRILEX 4 MG
15 LOZENGE BUCCAL PRN
Status: DISCONTINUED | OUTPATIENT
Start: 2022-02-19 | End: 2022-02-26 | Stop reason: HOSPADM

## 2022-02-19 RX ORDER — ATORVASTATIN CALCIUM 10 MG/1
10 TABLET, FILM COATED ORAL NIGHTLY
Status: DISCONTINUED | OUTPATIENT
Start: 2022-02-20 | End: 2022-02-26 | Stop reason: HOSPADM

## 2022-02-19 RX ORDER — GAUZE BANDAGE 2" X 2"
100 BANDAGE TOPICAL DAILY
Status: DISCONTINUED | OUTPATIENT
Start: 2022-02-20 | End: 2022-02-26 | Stop reason: HOSPADM

## 2022-02-19 RX ORDER — LORAZEPAM 1 MG/1
3 TABLET ORAL
Status: DISCONTINUED | OUTPATIENT
Start: 2022-02-19 | End: 2022-02-23

## 2022-02-19 RX ORDER — 0.9 % SODIUM CHLORIDE 0.9 %
1000 INTRAVENOUS SOLUTION INTRAVENOUS ONCE
Status: COMPLETED | OUTPATIENT
Start: 2022-02-19 | End: 2022-02-19

## 2022-02-19 RX ORDER — LANOLIN ALCOHOL/MO/W.PET/CERES
325 CREAM (GRAM) TOPICAL 2 TIMES DAILY
Status: DISCONTINUED | OUTPATIENT
Start: 2022-02-20 | End: 2022-02-26 | Stop reason: HOSPADM

## 2022-02-19 RX ORDER — LORAZEPAM 2 MG/ML
1 INJECTION INTRAMUSCULAR
Status: DISCONTINUED | OUTPATIENT
Start: 2022-02-19 | End: 2022-02-23

## 2022-02-19 RX ADMIN — INSULIN LISPRO 10 UNITS: 100 INJECTION, SOLUTION INTRAVENOUS; SUBCUTANEOUS at 21:04

## 2022-02-19 RX ADMIN — NALOXONE HYDROCHLORIDE 0.4 MG: 0.4 INJECTION, SOLUTION INTRAMUSCULAR; INTRAVENOUS; SUBCUTANEOUS at 19:13

## 2022-02-19 RX ADMIN — SODIUM CHLORIDE 1000 ML: 9 INJECTION, SOLUTION INTRAVENOUS at 19:52

## 2022-02-19 ASSESSMENT — PAIN SCALES - GENERAL: PAINLEVEL_OUTOF10: 8

## 2022-02-19 NOTE — ED NOTES
Bed: 23  Expected date:   Expected time:   Means of arrival:   Comments:  Med [id number] [address] Pelon Velasquez RN  02/19/22 6544
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
## 2022-02-20 PROBLEM — G93.40 ACUTE ENCEPHALOPATHY: Status: ACTIVE | Noted: 2022-02-20

## 2022-02-20 PROBLEM — F32.A DEPRESSION: Status: ACTIVE | Noted: 2022-02-20

## 2022-02-20 PROBLEM — F10.920 ALCOHOLIC INTOXICATION WITHOUT COMPLICATION (HCC): Status: ACTIVE | Noted: 2022-02-20

## 2022-02-20 LAB
ANION GAP SERPL CALCULATED.3IONS-SCNC: 7 MMOL/L (ref 9–17)
ANION GAP SERPL CALCULATED.3IONS-SCNC: 7 MMOL/L (ref 9–17)
ANION GAP SERPL CALCULATED.3IONS-SCNC: 9 MMOL/L (ref 9–17)
BUN BLDV-MCNC: 14 MG/DL (ref 8–23)
BUN BLDV-MCNC: 15 MG/DL (ref 8–23)
BUN BLDV-MCNC: 15 MG/DL (ref 8–23)
BUN/CREAT BLD: 15 (ref 9–20)
BUN/CREAT BLD: 16 (ref 9–20)
BUN/CREAT BLD: 17 (ref 9–20)
CALCIUM SERPL-MCNC: 8.4 MG/DL (ref 8.6–10.4)
CALCIUM SERPL-MCNC: 8.6 MG/DL (ref 8.6–10.4)
CALCIUM SERPL-MCNC: 8.8 MG/DL (ref 8.6–10.4)
CHLORIDE BLD-SCNC: 107 MMOL/L (ref 98–107)
CHLORIDE BLD-SCNC: 109 MMOL/L (ref 98–107)
CHLORIDE BLD-SCNC: 110 MMOL/L (ref 98–107)
CO2: 21 MMOL/L (ref 20–31)
CO2: 21 MMOL/L (ref 20–31)
CO2: 22 MMOL/L (ref 20–31)
CREAT SERPL-MCNC: 0.88 MG/DL (ref 0.7–1.2)
CREAT SERPL-MCNC: 0.92 MG/DL (ref 0.7–1.2)
CREAT SERPL-MCNC: 0.92 MG/DL (ref 0.7–1.2)
ESTIMATED AVERAGE GLUCOSE: 252 MG/DL
GFR AFRICAN AMERICAN: >60 ML/MIN
GFR NON-AFRICAN AMERICAN: >60 ML/MIN
GFR SERPL CREATININE-BSD FRML MDRD: ABNORMAL ML/MIN/{1.73_M2}
GLUCOSE BLD-MCNC: 100 MG/DL (ref 75–110)
GLUCOSE BLD-MCNC: 103 MG/DL (ref 70–99)
GLUCOSE BLD-MCNC: 127 MG/DL (ref 75–110)
GLUCOSE BLD-MCNC: 127 MG/DL (ref 75–110)
GLUCOSE BLD-MCNC: 174 MG/DL (ref 70–99)
GLUCOSE BLD-MCNC: 213 MG/DL (ref 70–99)
HBA1C MFR BLD: 10.4 % (ref 4–6)
INR BLD: 1
POTASSIUM SERPL-SCNC: 3.9 MMOL/L (ref 3.7–5.3)
POTASSIUM SERPL-SCNC: 3.9 MMOL/L (ref 3.7–5.3)
POTASSIUM SERPL-SCNC: 4.2 MMOL/L (ref 3.7–5.3)
PROTHROMBIN TIME: 13.3 SEC (ref 11.5–14.2)
SODIUM BLD-SCNC: 136 MMOL/L (ref 135–144)
SODIUM BLD-SCNC: 138 MMOL/L (ref 135–144)
SODIUM BLD-SCNC: 139 MMOL/L (ref 135–144)

## 2022-02-20 PROCEDURE — 97166 OT EVAL MOD COMPLEX 45 MIN: CPT

## 2022-02-20 PROCEDURE — 6360000002 HC RX W HCPCS: Performed by: NURSE PRACTITIONER

## 2022-02-20 PROCEDURE — 6370000000 HC RX 637 (ALT 250 FOR IP): Performed by: NURSE PRACTITIONER

## 2022-02-20 PROCEDURE — 99232 SBSQ HOSP IP/OBS MODERATE 35: CPT | Performed by: INTERNAL MEDICINE

## 2022-02-20 PROCEDURE — 97162 PT EVAL MOD COMPLEX 30 MIN: CPT

## 2022-02-20 PROCEDURE — 2580000003 HC RX 258: Performed by: INTERNAL MEDICINE

## 2022-02-20 PROCEDURE — 1200000000 HC SEMI PRIVATE

## 2022-02-20 PROCEDURE — 94640 AIRWAY INHALATION TREATMENT: CPT

## 2022-02-20 PROCEDURE — 80048 BASIC METABOLIC PNL TOTAL CA: CPT

## 2022-02-20 PROCEDURE — 97530 THERAPEUTIC ACTIVITIES: CPT

## 2022-02-20 PROCEDURE — 2580000003 HC RX 258: Performed by: NURSE PRACTITIONER

## 2022-02-20 PROCEDURE — 36415 COLL VENOUS BLD VENIPUNCTURE: CPT

## 2022-02-20 PROCEDURE — 83036 HEMOGLOBIN GLYCOSYLATED A1C: CPT

## 2022-02-20 PROCEDURE — 6360000002 HC RX W HCPCS: Performed by: INTERNAL MEDICINE

## 2022-02-20 PROCEDURE — 2060000000 HC ICU INTERMEDIATE R&B

## 2022-02-20 PROCEDURE — 82947 ASSAY GLUCOSE BLOOD QUANT: CPT

## 2022-02-20 PROCEDURE — 94761 N-INVAS EAR/PLS OXIMETRY MLT: CPT

## 2022-02-20 PROCEDURE — 85610 PROTHROMBIN TIME: CPT

## 2022-02-20 PROCEDURE — 2580000003 HC RX 258: Performed by: EMERGENCY MEDICINE

## 2022-02-20 RX ORDER — KETOROLAC TROMETHAMINE 15 MG/ML
15 INJECTION, SOLUTION INTRAMUSCULAR; INTRAVENOUS ONCE
Status: DISPENSED | OUTPATIENT
Start: 2022-02-20 | End: 2022-02-25

## 2022-02-20 RX ORDER — DIPHENHYDRAMINE HYDROCHLORIDE 50 MG/ML
25 INJECTION INTRAMUSCULAR; INTRAVENOUS ONCE
Status: COMPLETED | OUTPATIENT
Start: 2022-02-20 | End: 2022-02-20

## 2022-02-20 RX ORDER — ALBUTEROL SULFATE 90 UG/1
2 AEROSOL, METERED RESPIRATORY (INHALATION) EVERY 4 HOURS PRN
Status: DISCONTINUED | OUTPATIENT
Start: 2022-02-20 | End: 2022-02-20

## 2022-02-20 RX ORDER — HYDROXYZINE HYDROCHLORIDE 25 MG/1
25 TABLET, FILM COATED ORAL 3 TIMES DAILY PRN
Status: DISCONTINUED | OUTPATIENT
Start: 2022-02-20 | End: 2022-02-26 | Stop reason: HOSPADM

## 2022-02-20 RX ORDER — KETOROLAC TROMETHAMINE 15 MG/ML
15 INJECTION, SOLUTION INTRAMUSCULAR; INTRAVENOUS ONCE
Status: COMPLETED | OUTPATIENT
Start: 2022-02-20 | End: 2022-02-20

## 2022-02-20 RX ORDER — ALBUTEROL SULFATE 90 UG/1
2 AEROSOL, METERED RESPIRATORY (INHALATION) EVERY 4 HOURS PRN
Status: DISCONTINUED | OUTPATIENT
Start: 2022-02-20 | End: 2022-02-26 | Stop reason: HOSPADM

## 2022-02-20 RX ORDER — ALBUTEROL SULFATE 90 UG/1
2 AEROSOL, METERED RESPIRATORY (INHALATION) 2 TIMES DAILY
Status: DISCONTINUED | OUTPATIENT
Start: 2022-02-20 | End: 2022-02-23

## 2022-02-20 RX ORDER — FAMOTIDINE 20 MG/1
20 TABLET, FILM COATED ORAL ONCE
Status: COMPLETED | OUTPATIENT
Start: 2022-02-20 | End: 2022-02-20

## 2022-02-20 RX ORDER — DEXTROSE MONOHYDRATE 50 MG/ML
INJECTION, SOLUTION INTRAVENOUS CONTINUOUS
Status: DISCONTINUED | OUTPATIENT
Start: 2022-02-20 | End: 2022-02-21

## 2022-02-20 RX ADMIN — KETOROLAC TROMETHAMINE 15 MG: 15 INJECTION, SOLUTION INTRAMUSCULAR; INTRAVENOUS at 15:00

## 2022-02-20 RX ADMIN — FAMOTIDINE 20 MG: 20 TABLET ORAL at 09:34

## 2022-02-20 RX ADMIN — TAMSULOSIN HYDROCHLORIDE 0.4 MG: 0.4 CAPSULE ORAL at 21:55

## 2022-02-20 RX ADMIN — FAMOTIDINE 20 MG: 20 TABLET ORAL at 21:55

## 2022-02-20 RX ADMIN — AMITRIPTYLINE HYDROCHLORIDE 75 MG: 25 TABLET, FILM COATED ORAL at 01:09

## 2022-02-20 RX ADMIN — ASPIRIN 81 MG: 81 TABLET, COATED ORAL at 09:34

## 2022-02-20 RX ADMIN — ALBUTEROL SULFATE 2 PUFF: 90 AEROSOL, METERED RESPIRATORY (INHALATION) at 21:19

## 2022-02-20 RX ADMIN — FAMOTIDINE 20 MG: 20 TABLET, FILM COATED ORAL at 01:03

## 2022-02-20 RX ADMIN — APIXABAN 5 MG: 5 TABLET, FILM COATED ORAL at 09:33

## 2022-02-20 RX ADMIN — APIXABAN 5 MG: 5 TABLET, FILM COATED ORAL at 21:55

## 2022-02-20 RX ADMIN — INSULIN LISPRO 1 UNITS: 100 INJECTION, SOLUTION INTRAVENOUS; SUBCUTANEOUS at 11:58

## 2022-02-20 RX ADMIN — FERROUS SULFATE TAB EC 325 MG (65 MG FE EQUIVALENT) 325 MG: 325 (65 FE) TABLET DELAYED RESPONSE at 21:56

## 2022-02-20 RX ADMIN — PROBIOTIC PRODUCT - TAB 1 TABLET: TAB at 21:54

## 2022-02-20 RX ADMIN — FERROUS SULFATE TAB EC 325 MG (65 MG FE EQUIVALENT) 325 MG: 325 (65 FE) TABLET DELAYED RESPONSE at 09:34

## 2022-02-20 RX ADMIN — DEXTROSE MONOHYDRATE: 50 INJECTION, SOLUTION INTRAVENOUS at 09:31

## 2022-02-20 RX ADMIN — DOCUSATE SODIUM 100 MG: 100 CAPSULE, LIQUID FILLED ORAL at 09:34

## 2022-02-20 RX ADMIN — METOPROLOL TARTRATE 25 MG: 25 TABLET, FILM COATED ORAL at 21:55

## 2022-02-20 RX ADMIN — PREGABALIN 75 MG: 75 CAPSULE ORAL at 14:23

## 2022-02-20 RX ADMIN — SODIUM CHLORIDE 1000 ML: 9 INJECTION, SOLUTION INTRAVENOUS at 01:00

## 2022-02-20 RX ADMIN — METOPROLOL TARTRATE 25 MG: 25 TABLET, FILM COATED ORAL at 09:34

## 2022-02-20 RX ADMIN — PREGABALIN 75 MG: 75 CAPSULE ORAL at 09:33

## 2022-02-20 RX ADMIN — TAMSULOSIN HYDROCHLORIDE 0.4 MG: 0.4 CAPSULE ORAL at 01:18

## 2022-02-20 RX ADMIN — INSULIN LISPRO 2 UNITS: 100 INJECTION, SOLUTION INTRAVENOUS; SUBCUTANEOUS at 09:34

## 2022-02-20 RX ADMIN — ATORVASTATIN CALCIUM 10 MG: 10 TABLET, FILM COATED ORAL at 21:55

## 2022-02-20 RX ADMIN — SODIUM CHLORIDE, PRESERVATIVE FREE 10 ML: 5 INJECTION INTRAVENOUS at 21:55

## 2022-02-20 RX ADMIN — MULTIPLE VITAMINS W/ MINERALS TAB 1 TABLET: TAB at 09:33

## 2022-02-20 RX ADMIN — AMITRIPTYLINE HYDROCHLORIDE 75 MG: 25 TABLET, FILM COATED ORAL at 21:55

## 2022-02-20 RX ADMIN — PREGABALIN 75 MG: 75 CAPSULE ORAL at 21:55

## 2022-02-20 RX ADMIN — PROBIOTIC PRODUCT - TAB 1 TABLET: TAB at 09:34

## 2022-02-20 RX ADMIN — SODIUM CHLORIDE, PRESERVATIVE FREE 10 ML: 5 INJECTION INTRAVENOUS at 09:33

## 2022-02-20 RX ADMIN — Medication 100 MG: at 09:34

## 2022-02-20 RX ADMIN — DIPHENHYDRAMINE HYDROCHLORIDE 25 MG: 50 INJECTION, SOLUTION INTRAMUSCULAR; INTRAVENOUS at 01:02

## 2022-02-20 RX ADMIN — SODIUM CHLORIDE: 9 INJECTION, SOLUTION INTRAVENOUS at 02:02

## 2022-02-20 RX ADMIN — DOCUSATE SODIUM 100 MG: 100 CAPSULE, LIQUID FILLED ORAL at 21:54

## 2022-02-20 RX ADMIN — BISACODYL 5 MG: 5 TABLET, COATED ORAL at 09:40

## 2022-02-20 RX ADMIN — INSULIN GLARGINE 25 UNITS: 100 INJECTION, SOLUTION SUBCUTANEOUS at 21:54

## 2022-02-20 ASSESSMENT — PAIN SCALES - GENERAL
PAINLEVEL_OUTOF10: 0
PAINLEVEL_OUTOF10: 7
PAINLEVEL_OUTOF10: 7
PAINLEVEL_OUTOF10: 0

## 2022-02-20 ASSESSMENT — ENCOUNTER SYMPTOMS
CONSTIPATION: 0
SINUS PRESSURE: 0
VOMITING: 0
SHORTNESS OF BREATH: 0
ABDOMINAL PAIN: 0
COUGH: 0
DIARRHEA: 0

## 2022-02-20 ASSESSMENT — PAIN DESCRIPTION - ORIENTATION: ORIENTATION: LEFT

## 2022-02-20 ASSESSMENT — PAIN DESCRIPTION - LOCATION: LOCATION: OTHER (COMMENT)

## 2022-02-20 ASSESSMENT — PAIN DESCRIPTION - PAIN TYPE: TYPE: ACUTE PAIN
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
## 2022-02-20 NOTE — RT PROTOCOL NOTE
RT Inhaler-Nebulizer Bronchodilator Protocol Note    There is a bronchodilator order in the chart from a provider indicating to follow the RT Bronchodilator Protocol and there is an Initiate RT Inhaler-Nebulizer Bronchodilator Protocol order as well (see protocol at bottom of note). CXR Findings:  XR CHEST PORTABLE    Result Date: 2/19/2022  No acute process. The findings from the last RT Protocol Assessment were as follows:   History Pulmonary Disease: Chronic pulmonary disease  Respiratory Pattern: Regular pattern and RR 12-20 bpm  Breath Sounds: Slightly diminished and/or crackles  Cough: Strong, spontaneous, non-productive  Indication for Bronchodilator Therapy:    Bronchodilator Assessment Score: 4    Aerosolized bronchodilator medication orders have been revised according to the RT Inhaler-Nebulizer Bronchodilator Protocol below. Respiratory Therapist to perform RT Therapy Protocol Assessment initially then follow the protocol. Repeat RT Therapy Protocol Assessment PRN for score 0-3 or on second treatment, BID, and PRN for scores above 3. No Indications - adjust the frequency to every 6 hours PRN wheezing or bronchospasm, if no treatments needed after 48 hours then discontinue using Per Protocol order mode. If indication present, adjust the RT bronchodilator orders based on the Bronchodilator Assessment Score as indicated below. Use Inhaler orders unless patient has one or more of the following: on home nebulizer, not able to hold breath for 10 seconds, is not alert and oriented, cannot activate and use MDI correctly, or respiratory rate 25 breaths per minute or more, then use the equivalent nebulizer order(s) with same Frequency and PRN reasons based on the score. If a patient is on this medication at home then do not decrease Frequency below that used at home.     0-3 - enter or revise RT bronchodilator order(s) to equivalent RT Bronchodilator order with Frequency of every 4 hours PRN for wheezing or increased work of breathing using Per Protocol order mode. 4-6 - enter or revise RT Bronchodilator order(s) to two equivalent RT bronchodilator orders with one order with BID Frequency and one order with Frequency of every 4 hours PRN wheezing or increased work of breathing using Per Protocol order mode. 7-10 - enter or revise RT Bronchodilator order(s) to two equivalent RT bronchodilator orders with one order with TID Frequency and one order with Frequency of every 4 hours PRN wheezing or increased work of breathing using Per Protocol order mode. 11-13 - enter or revise RT Bronchodilator order(s) to one equivalent RT bronchodilator order with QID Frequency and an Albuterol order with Frequency of every 4 hours PRN wheezing or increased work of breathing using Per Protocol order mode. Greater than 13 - enter or revise RT Bronchodilator order(s) to one equivalent RT bronchodilator order with every 4 hours Frequency and an Albuterol order with Frequency of every 2 hours PRN wheezing or increased work of breathing using Per Protocol order mode. RT to enter RT Home Evaluation for COPD & MDI Assessment order using Per Protocol order mode.     Electronically signed by Ashly Almendarez RCP on 2/20/2022 at 2:37 AM
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
## 2022-02-20 NOTE — FLOWSHEET NOTE
Patient was sleeping, observed by [employer].  shared in silent prayer.    leaves prayer card for possible follow up.     02/20/22 1348   Encounter Summary   Services provided to: Patient   Referral/Consult From: Rounding   Continue Visiting   (2-20-22 PT, sleeping)   Complexity of Encounter Low   Length of Encounter 15 minutes   Routine   Type Initial   Assessment Sleeping   Intervention Prayer

## 2022-02-20 NOTE — ED NOTES
Pt able to talk in complete sentences at this time. Pt answering questions appropriately at this time.        [facility]  02/19/22 9025
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
## 2022-02-21 LAB
ANION GAP SERPL CALCULATED.3IONS-SCNC: 6 MMOL/L (ref 9–17)
BUN BLDV-MCNC: 13 MG/DL (ref 8–23)
BUN/CREAT BLD: 15 (ref 9–20)
CALCIUM SERPL-MCNC: 8.1 MG/DL (ref 8.6–10.4)
CHLORIDE BLD-SCNC: 105 MMOL/L (ref 98–107)
CO2: 24 MMOL/L (ref 20–31)
CREAT SERPL-MCNC: 0.88 MG/DL (ref 0.7–1.2)
EKG ATRIAL RATE: 83 BPM
EKG P AXIS: 55 DEGREES
EKG P-R INTERVAL: 144 MS
EKG Q-T INTERVAL: 374 MS
EKG QRS DURATION: 82 MS
EKG QTC CALCULATION (BAZETT): 439 MS
EKG R AXIS: 42 DEGREES
EKG T AXIS: 35 DEGREES
EKG VENTRICULAR RATE: 83 BPM
GFR AFRICAN AMERICAN: >60 ML/MIN
GFR NON-AFRICAN AMERICAN: >60 ML/MIN
GFR SERPL CREATININE-BSD FRML MDRD: ABNORMAL ML/MIN/{1.73_M2}
GFR SERPL CREATININE-BSD FRML MDRD: ABNORMAL ML/MIN/{1.73_M2}
GLUCOSE BLD-MCNC: 186 MG/DL (ref 75–110)
GLUCOSE BLD-MCNC: 188 MG/DL (ref 75–110)
GLUCOSE BLD-MCNC: 292 MG/DL (ref 70–99)
GLUCOSE BLD-MCNC: 405 MG/DL (ref 75–110)
GLUCOSE BLD-MCNC: 84 MG/DL (ref 75–110)
POTASSIUM SERPL-SCNC: 4.1 MMOL/L (ref 3.7–5.3)
SODIUM BLD-SCNC: 135 MMOL/L (ref 135–144)

## 2022-02-21 PROCEDURE — 2580000003 HC RX 258: Performed by: NURSE PRACTITIONER

## 2022-02-21 PROCEDURE — 2580000003 HC RX 258: Performed by: INTERNAL MEDICINE

## 2022-02-21 PROCEDURE — 80048 BASIC METABOLIC PNL TOTAL CA: CPT

## 2022-02-21 PROCEDURE — 1200000000 HC SEMI PRIVATE

## 2022-02-21 PROCEDURE — 6370000000 HC RX 637 (ALT 250 FOR IP): Performed by: NURSE PRACTITIONER

## 2022-02-21 PROCEDURE — 99232 SBSQ HOSP IP/OBS MODERATE 35: CPT | Performed by: INTERNAL MEDICINE

## 2022-02-21 PROCEDURE — 6370000000 HC RX 637 (ALT 250 FOR IP): Performed by: INTERNAL MEDICINE

## 2022-02-21 PROCEDURE — 94640 AIRWAY INHALATION TREATMENT: CPT

## 2022-02-21 PROCEDURE — 82947 ASSAY GLUCOSE BLOOD QUANT: CPT

## 2022-02-21 PROCEDURE — 36415 COLL VENOUS BLD VENIPUNCTURE: CPT

## 2022-02-21 PROCEDURE — 93010 ELECTROCARDIOGRAM REPORT: CPT | Performed by: INTERNAL MEDICINE

## 2022-02-21 RX ORDER — OXYCODONE HYDROCHLORIDE AND ACETAMINOPHEN 5; 325 MG/1; MG/1
1 TABLET ORAL EVERY 8 HOURS PRN
Status: DISPENSED | OUTPATIENT
Start: 2022-02-21 | End: 2022-02-23

## 2022-02-21 RX ORDER — PANTOPRAZOLE SODIUM 40 MG/1
40 TABLET, DELAYED RELEASE ORAL DAILY
COMMUNITY
End: 2022-03-24

## 2022-02-21 RX ORDER — ALOGLIPTIN 25 MG/1
25 TABLET, FILM COATED ORAL DAILY
Status: DISCONTINUED | OUTPATIENT
Start: 2022-02-21 | End: 2022-02-26 | Stop reason: HOSPADM

## 2022-02-21 RX ORDER — HYDROXYZINE HYDROCHLORIDE 25 MG/1
25 TABLET, FILM COATED ORAL DAILY
COMMUNITY
End: 2022-03-24 | Stop reason: SDUPTHER

## 2022-02-21 RX ORDER — FERROUS SULFATE 325(65) MG
325 TABLET ORAL
COMMUNITY
End: 2022-03-24 | Stop reason: SDUPTHER

## 2022-02-21 RX ADMIN — INSULIN LISPRO 12 UNITS: 100 INJECTION, SOLUTION INTRAVENOUS; SUBCUTANEOUS at 13:35

## 2022-02-21 RX ADMIN — DEXTROSE MONOHYDRATE: 50 INJECTION, SOLUTION INTRAVENOUS at 06:41

## 2022-02-21 RX ADMIN — METOPROLOL TARTRATE 25 MG: 25 TABLET, FILM COATED ORAL at 20:59

## 2022-02-21 RX ADMIN — ALOGLIPTIN 25 MG: 25 TABLET, FILM COATED ORAL at 15:55

## 2022-02-21 RX ADMIN — Medication 100 MG: at 10:03

## 2022-02-21 RX ADMIN — INSULIN GLARGINE 25 UNITS: 100 INJECTION, SOLUTION SUBCUTANEOUS at 21:03

## 2022-02-21 RX ADMIN — ALBUTEROL SULFATE 2 PUFF: 90 AEROSOL, METERED RESPIRATORY (INHALATION) at 07:48

## 2022-02-21 RX ADMIN — ALBUTEROL SULFATE 2 PUFF: 90 AEROSOL, METERED RESPIRATORY (INHALATION) at 20:31

## 2022-02-21 RX ADMIN — DOCUSATE SODIUM 100 MG: 100 CAPSULE, LIQUID FILLED ORAL at 10:02

## 2022-02-21 RX ADMIN — INSULIN LISPRO 6 UNITS: 100 INJECTION, SOLUTION INTRAVENOUS; SUBCUTANEOUS at 13:14

## 2022-02-21 RX ADMIN — PREGABALIN 75 MG: 75 CAPSULE ORAL at 13:13

## 2022-02-21 RX ADMIN — HYDROXYZINE HYDROCHLORIDE 25 MG: 25 TABLET, FILM COATED ORAL at 22:58

## 2022-02-21 RX ADMIN — FAMOTIDINE 20 MG: 20 TABLET ORAL at 10:03

## 2022-02-21 RX ADMIN — FERROUS SULFATE TAB EC 325 MG (65 MG FE EQUIVALENT) 325 MG: 325 (65 FE) TABLET DELAYED RESPONSE at 10:04

## 2022-02-21 RX ADMIN — PREGABALIN 75 MG: 75 CAPSULE ORAL at 20:59

## 2022-02-21 RX ADMIN — FAMOTIDINE 20 MG: 20 TABLET ORAL at 20:59

## 2022-02-21 RX ADMIN — FERROUS SULFATE TAB EC 325 MG (65 MG FE EQUIVALENT) 325 MG: 325 (65 FE) TABLET DELAYED RESPONSE at 20:59

## 2022-02-21 RX ADMIN — INSULIN LISPRO 1 UNITS: 100 INJECTION, SOLUTION INTRAVENOUS; SUBCUTANEOUS at 10:06

## 2022-02-21 RX ADMIN — METOPROLOL TARTRATE 25 MG: 25 TABLET, FILM COATED ORAL at 10:03

## 2022-02-21 RX ADMIN — ASPIRIN 81 MG: 81 TABLET, COATED ORAL at 10:03

## 2022-02-21 RX ADMIN — DOCUSATE SODIUM 100 MG: 100 CAPSULE, LIQUID FILLED ORAL at 20:59

## 2022-02-21 RX ADMIN — OXYCODONE AND ACETAMINOPHEN 1 TABLET: 5; 325 TABLET ORAL at 15:11

## 2022-02-21 RX ADMIN — PREGABALIN 75 MG: 75 CAPSULE ORAL at 10:04

## 2022-02-21 RX ADMIN — APIXABAN 5 MG: 5 TABLET, FILM COATED ORAL at 10:03

## 2022-02-21 RX ADMIN — APIXABAN 5 MG: 5 TABLET, FILM COATED ORAL at 20:59

## 2022-02-21 RX ADMIN — PROBIOTIC PRODUCT - TAB 1 TABLET: TAB at 20:59

## 2022-02-21 RX ADMIN — ATORVASTATIN CALCIUM 10 MG: 10 TABLET, FILM COATED ORAL at 20:59

## 2022-02-21 RX ADMIN — MULTIPLE VITAMINS W/ MINERALS TAB 1 TABLET: TAB at 10:04

## 2022-02-21 RX ADMIN — TAMSULOSIN HYDROCHLORIDE 0.4 MG: 0.4 CAPSULE ORAL at 20:59

## 2022-02-21 RX ADMIN — OXYCODONE AND ACETAMINOPHEN 1 TABLET: 5; 325 TABLET ORAL at 22:58

## 2022-02-21 RX ADMIN — INSULIN LISPRO 2 UNITS: 100 INJECTION, SOLUTION INTRAVENOUS; SUBCUTANEOUS at 21:03

## 2022-02-21 RX ADMIN — SODIUM CHLORIDE, PRESERVATIVE FREE 10 ML: 5 INJECTION INTRAVENOUS at 21:09

## 2022-02-21 RX ADMIN — PROBIOTIC PRODUCT - TAB 1 TABLET: TAB at 10:02

## 2022-02-21 ASSESSMENT — PAIN SCALES - GENERAL
PAINLEVEL_OUTOF10: 8
PAINLEVEL_OUTOF10: 3
PAINLEVEL_OUTOF10: 8
PAINLEVEL_OUTOF10: 5

## 2022-02-21 NOTE — PLAN OF CARE
Problem: Pain:  Goal: Pain level will decrease  Description: Pain level will decrease  2/21/2022 0034 by Arun Arriaga RN  Outcome: Ongoing     Problem: Pain:  Goal: Control of acute pain  Description: Control of acute pain  2/21/2022 0034 by Arun Arriaga RN  Outcome: Ongoing     Problem: Pain:  Goal: Control of chronic pain  Description: Control of chronic pain  2/21/2022 0034 by Arun Arriaga RN  Outcome: Ongoing     Problem: Skin Integrity:  Goal: Will show no infection signs and symptoms  Description: Will show no infection signs and symptoms  2/21/2022 0034 by Arun Arriaga RN  Outcome: Ongoing     Problem: Skin Integrity:  Goal: Absence of new skin breakdown  Description: Absence of new skin breakdown  2/21/2022 0034 by Arun Arriaga RN  Outcome: Ongoing     Problem: Falls - Risk of:  Goal: Will remain free from falls  Description: Will remain free from falls  2/21/2022 0034 by Arun Arriaga RN  Outcome: Ongoing     Problem: Falls - Risk of:  Goal: Absence of physical injury  Description: Absence of physical injury  2/21/2022 0034 by Arun Arriaga RN  Outcome: Ongoing
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
## 2022-02-21 NOTE — RT PROTOCOL NOTE
RT Inhaler-Nebulizer Bronchodilator Protocol Note    There is a bronchodilator order in the chart from a provider indicating to follow the RT Bronchodilator Protocol and there is an Initiate RT Inhaler-Nebulizer Bronchodilator Protocol order as well (see protocol at bottom of note). CXR Findings:  XR CHEST PORTABLE    Result Date: 2/19/2022  No acute process. The findings from the last RT Protocol Assessment were as follows:   History Pulmonary Disease: Chronic pulmonary disease  Respiratory Pattern: Dyspnea on exertion or RR 21-25 bpm  Breath Sounds: Clear breath sounds  Cough: Strong, productive  Indication for Bronchodilator Therapy: On home bronchodilators  Bronchodilator Assessment Score: 5    Aerosolized bronchodilator medication orders have been revised according to the RT Inhaler-Nebulizer Bronchodilator Protocol below. Respiratory Therapist to perform RT Therapy Protocol Assessment initially then follow the protocol. Repeat RT Therapy Protocol Assessment PRN for score 0-3 or on second treatment, BID, and PRN for scores above 3. No Indications - adjust the frequency to every 6 hours PRN wheezing or bronchospasm, if no treatments needed after 48 hours then discontinue using Per Protocol order mode. If indication present, adjust the RT bronchodilator orders based on the Bronchodilator Assessment Score as indicated below. Use Inhaler orders unless patient has one or more of the following: on home nebulizer, not able to hold breath for 10 seconds, is not alert and oriented, cannot activate and use MDI correctly, or respiratory rate 25 breaths per minute or more, then use the equivalent nebulizer order(s) with same Frequency and PRN reasons based on the score. If a patient is on this medication at home then do not decrease Frequency below that used at home.     0-3 - enter or revise RT bronchodilator order(s) to equivalent RT Bronchodilator order with Frequency of every 4 hours PRN for wheezing or increased work of breathing using Per Protocol order mode. 4-6 - enter or revise RT Bronchodilator order(s) to two equivalent RT bronchodilator orders with one order with BID Frequency and one order with Frequency of every 4 hours PRN wheezing or increased work of breathing using Per Protocol order mode. 7-10 - enter or revise RT Bronchodilator order(s) to two equivalent RT bronchodilator orders with one order with TID Frequency and one order with Frequency of every 4 hours PRN wheezing or increased work of breathing using Per Protocol order mode. 11-13 - enter or revise RT Bronchodilator order(s) to one equivalent RT bronchodilator order with QID Frequency and an Albuterol order with Frequency of every 4 hours PRN wheezing or increased work of breathing using Per Protocol order mode. Greater than 13 - enter or revise RT Bronchodilator order(s) to one equivalent RT bronchodilator order with every 4 hours Frequency and an Albuterol order with Frequency of every 2 hours PRN wheezing or increased work of breathing using Per Protocol order mode. RT to enter RT Home Evaluation for COPD & MDI Assessment order using Per Protocol order mode.     Electronically signed by Opal Anderson RCP on 2/20/2022 at 9:27 PM

## 2022-02-21 NOTE — PROGRESS NOTES
Occupational [address]  Occupational Therapy Not Seen Note    Patient not available for Occupational Therapy due to:    [] Testing:    [] Hemodialysis    [] Cancelled by RN:    [x]Refusal by Patient: 2/21: (CX) Pt adamantly refusing therapy at time. Reports he is going home today.  Will check back later as able.      [] Surgery:     [] Intubation:     [] Pain Medication:    [] Sedation:     [] Spine Precautions :    [] Medical Instability:    [] Other:      Russel Renee OT

## 2022-02-21 NOTE — PROGRESS NOTES
Physician Progress Note      PATIENT:               Monda Holter  CSN #:                  [id number]  :                       1955  ADMIT DATE:       2022 6:44 PM  [address] [place] DATE:  RESPONDING  PROVIDER #:        Yue Us MD        QUERY TEXT:    Type of Encephalopathy: Please provide further specificity, if known. Clinical indicators include: altered mental status, alcohol, chronic kidney   disease, liver disease, sepsis, alcohol use, alcohol abuse, fever, ammonia,   intracranial hemorrhage, hyponatremia, hyperglycemia, acute, encephalopathy,   hypoglycemia, fevers  Options provided:  -- Anoxic/hypoxic encephalopathy  -- Metabolic encephalopathy  -- Toxic encephalopathy  -- Hepatic encephalopathy  -- Hypertensive encephalopathy  -- Other - I will add my own diagnosis  -- Disagree - Not applicable / Not valid  -- Disagree - Clinically Unable to determine / Unknown        PROVIDER RESPONSE TEXT:    The patient has toxic encephalopathy.       Electronically signed by:  Yue Us MD 2022 10:14 AM

## 2022-02-21 NOTE — PROGRESS NOTES
Transitions of Care Pharmacy Service   Medication Review    The patient's list of current home medications has been reviewed. Source(s) of information: spoke to patient and [facility]     Based on information provided by the above source(s), I have updated the patient's home med list as described below. I changed or updated the following medications on the patient's home medication list:  Discontinued Amitriptyline 75 mg qd nightly   Bisacodyl 5 mg EC prn for constipation   Maalox 200-200-20  suspension 10 ml every 6 hrs prn   Docusate sodium 100 mg bid   Lactobacillus 1 tablet bid  Simethicone 80 mg tablet    Tamsulosin(flomax)0.4 mg 1 cap nightly      Added Protonix 40 1 qd      Adjusted   Ferrous sulfate 1 qd  Hydroxyzine 25 mg 1 qd      Other Notes Pt ran out of Symbicort 160-4.5 mcg inhaler. Stated that he will continue to use it just needed more refills. Please feel free to call me with any questions about this encounter. Thank you. This note will be reviewed and co-signed by the [facility] Pharmacist. The pharmacist will review inpatient orders and contact the physician about any discrepancies. Angi Duran, pharmacy technician  [facility] Pharmacy Service  Phone:  [phone]  Fax: [phone]      Electronically signed by Angi Duran on 2/21/2022 at 3:16 PM         Prior to Admission medications    Medication Sig Start Date End Date Taking? Authorizing Provider   pregabalin (LYRICA) 75 MG capsule Take 75 mg by mouth 3 times daily.     Historical Provider, MD   SITagliptin (JANUVIA) 100 MG tablet Take 100 mg by mouth daily    Historical Provider, MD   insulin lispro (HUMALOG) 100 UNIT/ML injection vial Inject 0-12 Units into the skin 3 times daily (with meals) 11/12/21   Miya Ceja MD   aspirin EC 81 MG EC tablet Take 81 mg by mouth daily    Historical Provider, MD   apixaban (ELIQUIS) 5 MG TABS tablet Take 1 tablet by mouth 2 times daily 10/5/21   MARCELO Sevilla CNP   insulin detemir (LEVEMIR) 100 UNIT/ML injection vial Inject 25 units, subcutaenously daily with diner 10/5/21   MARCELO Sevilla CNP   famotidine (PEPCID) 20 MG tablet Take 1 tablet by mouth 2 times daily 10/5/21   MARCELO Sevilla CNP   metFORMIN (GLUCOPHAGE) 500 MG tablet Take 1 tablet by mouth 2 times daily (with meals)  Patient taking differently: Take 1,000 mg by mouth 2 times daily (with meals)  10/5/21   MARCELO Sevilla CNP   Lancets MISC 1 each by Does not apply route 3 times daily 10/5/21   MARCELO Sevilla CNP   ferrous sulfate (IRON 325) 325 (65 Fe) MG tablet Take 1 tablet by mouth 2 times daily 10/5/21   MARCELO Sevilla CNP   metoprolol tartrate (LOPRESSOR) 25 MG tablet Take 1 tablet by mouth 2 times daily Hold for heart rate less than 60 or systolic blood pressure less than 100 10/5/21   MARCELO Sevilla CNP   hydrOXYzine (VISTARIL) 25 MG capsule Take 1 capsule by mouth 3 times daily as needed for Anxiety 10/5/21   MARCELO Sevilla CNP   naloxegol (MOVANTIK) 25 MG TABS tablet Take 1 tablet by mouth every morning 10/5/21   MARCELO Sevilla CNP   budesonide-formoterol [facility]) 160-4.5 MCG/ACT AERO Inhale 2 puffs into the lungs 2 times daily 10/5/21   MARCELO Sevilla CNP   atorvastatin (LIPITOR) 10 MG tablet Take 10 mg by mouth nightly  8/9/21   Historical Provider, MD   glucose monitoring (FREESTYLE) kit 1 kit by Does not apply route daily 9/24/21   MARCELO Sevilla CNP   Multiple Vitamins-Minerals (THERAPEUTIC MULTIVITAMIN-MINERALS) tablet Take 1 tablet by mouth daily    Historical Provider, MD   albuterol sulfate HFA (VENTOLIN HFA) 108 (90 Base) MCG/ACT inhaler Inhale 2 puffs into the lungs every 6 hours as needed for Wheezing    Historical Provider, MD

## 2022-02-22 LAB
GLUCOSE BLD-MCNC: 168 MG/DL (ref 75–110)
GLUCOSE BLD-MCNC: 204 MG/DL (ref 75–110)
GLUCOSE BLD-MCNC: 206 MG/DL (ref 75–110)
GLUCOSE BLD-MCNC: 97 MG/DL (ref 75–110)

## 2022-02-22 PROCEDURE — 6370000000 HC RX 637 (ALT 250 FOR IP): Performed by: INTERNAL MEDICINE

## 2022-02-22 PROCEDURE — 2580000003 HC RX 258: Performed by: NURSE PRACTITIONER

## 2022-02-22 PROCEDURE — 6370000000 HC RX 637 (ALT 250 FOR IP): Performed by: NURSE PRACTITIONER

## 2022-02-22 PROCEDURE — 97164 PT RE-EVAL EST PLAN CARE: CPT

## 2022-02-22 PROCEDURE — 99232 SBSQ HOSP IP/OBS MODERATE 35: CPT | Performed by: FAMILY MEDICINE

## 2022-02-22 PROCEDURE — 94640 AIRWAY INHALATION TREATMENT: CPT

## 2022-02-22 PROCEDURE — 82947 ASSAY GLUCOSE BLOOD QUANT: CPT

## 2022-02-22 PROCEDURE — 1200000000 HC SEMI PRIVATE

## 2022-02-22 PROCEDURE — 97530 THERAPEUTIC ACTIVITIES: CPT

## 2022-02-22 PROCEDURE — 94760 N-INVAS EAR/PLS OXIMETRY 1: CPT

## 2022-02-22 RX ADMIN — PREGABALIN 75 MG: 75 CAPSULE ORAL at 20:41

## 2022-02-22 RX ADMIN — INSULIN GLARGINE 25 UNITS: 100 INJECTION, SOLUTION SUBCUTANEOUS at 20:36

## 2022-02-22 RX ADMIN — INSULIN LISPRO 6 UNITS: 100 INJECTION, SOLUTION INTRAVENOUS; SUBCUTANEOUS at 14:06

## 2022-02-22 RX ADMIN — METOPROLOL TARTRATE 25 MG: 25 TABLET, FILM COATED ORAL at 20:42

## 2022-02-22 RX ADMIN — PREGABALIN 75 MG: 75 CAPSULE ORAL at 09:46

## 2022-02-22 RX ADMIN — OXYCODONE AND ACETAMINOPHEN 1 TABLET: 5; 325 TABLET ORAL at 17:55

## 2022-02-22 RX ADMIN — SODIUM CHLORIDE, PRESERVATIVE FREE 10 ML: 5 INJECTION INTRAVENOUS at 09:56

## 2022-02-22 RX ADMIN — ALBUTEROL SULFATE 2 PUFF: 90 AEROSOL, METERED RESPIRATORY (INHALATION) at 19:37

## 2022-02-22 RX ADMIN — APIXABAN 5 MG: 5 TABLET, FILM COATED ORAL at 20:41

## 2022-02-22 RX ADMIN — APIXABAN 5 MG: 5 TABLET, FILM COATED ORAL at 09:46

## 2022-02-22 RX ADMIN — FAMOTIDINE 20 MG: 20 TABLET ORAL at 20:41

## 2022-02-22 RX ADMIN — ALBUTEROL SULFATE 2 PUFF: 90 AEROSOL, METERED RESPIRATORY (INHALATION) at 09:12

## 2022-02-22 RX ADMIN — MULTIPLE VITAMINS W/ MINERALS TAB 1 TABLET: TAB at 09:46

## 2022-02-22 RX ADMIN — DOCUSATE SODIUM 100 MG: 100 CAPSULE, LIQUID FILLED ORAL at 20:41

## 2022-02-22 RX ADMIN — PREGABALIN 75 MG: 75 CAPSULE ORAL at 14:06

## 2022-02-22 RX ADMIN — FERROUS SULFATE TAB EC 325 MG (65 MG FE EQUIVALENT) 325 MG: 325 (65 FE) TABLET DELAYED RESPONSE at 09:46

## 2022-02-22 RX ADMIN — METOPROLOL TARTRATE 25 MG: 25 TABLET, FILM COATED ORAL at 09:47

## 2022-02-22 RX ADMIN — ATORVASTATIN CALCIUM 10 MG: 10 TABLET, FILM COATED ORAL at 20:41

## 2022-02-22 RX ADMIN — PROBIOTIC PRODUCT - TAB 1 TABLET: TAB at 09:45

## 2022-02-22 RX ADMIN — OXYCODONE AND ACETAMINOPHEN 1 TABLET: 5; 325 TABLET ORAL at 09:54

## 2022-02-22 RX ADMIN — SODIUM CHLORIDE, PRESERVATIVE FREE 10 ML: 5 INJECTION INTRAVENOUS at 20:49

## 2022-02-22 RX ADMIN — INSULIN LISPRO 3 UNITS: 100 INJECTION, SOLUTION INTRAVENOUS; SUBCUTANEOUS at 20:37

## 2022-02-22 RX ADMIN — ASPIRIN 81 MG: 81 TABLET, COATED ORAL at 09:54

## 2022-02-22 RX ADMIN — INSULIN LISPRO 3 UNITS: 100 INJECTION, SOLUTION INTRAVENOUS; SUBCUTANEOUS at 09:49

## 2022-02-22 RX ADMIN — Medication 100 MG: at 09:46

## 2022-02-22 RX ADMIN — FERROUS SULFATE TAB EC 325 MG (65 MG FE EQUIVALENT) 325 MG: 325 (65 FE) TABLET DELAYED RESPONSE at 20:41

## 2022-02-22 RX ADMIN — FAMOTIDINE 20 MG: 20 TABLET ORAL at 09:46

## 2022-02-22 RX ADMIN — PROBIOTIC PRODUCT - TAB 1 TABLET: TAB at 20:41

## 2022-02-22 RX ADMIN — TAMSULOSIN HYDROCHLORIDE 0.4 MG: 0.4 CAPSULE ORAL at 20:41

## 2022-02-22 RX ADMIN — ALOGLIPTIN 25 MG: 25 TABLET, FILM COATED ORAL at 09:45

## 2022-02-22 ASSESSMENT — ENCOUNTER SYMPTOMS
VOMITING: 0
SHORTNESS OF BREATH: 0
ABDOMINAL PAIN: 0
NAUSEA: 0
CHEST TIGHTNESS: 0
DIARRHEA: 0
COUGH: 0
CHOKING: 0
RHINORRHEA: 0
BACK PAIN: 1
SINUS PRESSURE: 0
CONSTIPATION: 0
VOICE CHANGE: 0
WHEEZING: 0

## 2022-02-22 ASSESSMENT — PAIN SCALES - GENERAL
PAINLEVEL_OUTOF10: 7
PAINLEVEL_OUTOF10: 4
PAINLEVEL_OUTOF10: 7
PAINLEVEL_OUTOF10: 7
PAINLEVEL_OUTOF10: 0

## 2022-02-22 ASSESSMENT — PAIN DESCRIPTION - PAIN TYPE: TYPE: ACUTE PAIN

## 2022-02-22 ASSESSMENT — PAIN DESCRIPTION - ORIENTATION: ORIENTATION: LEFT;DISTAL

## 2022-02-22 ASSESSMENT — PAIN DESCRIPTION - LOCATION: LOCATION: LEG

## 2022-02-22 NOTE — PLAN OF CARE
Problem: Pain:  Goal: Pain level will decrease  Description: Pain level will decrease  2/22/2022 1622 by Bill Resendez, HA  Outcome: Ongoing  Pt c/o phantom limb pain rated 7-8. Medicated with PRN Percocet. Repositioned for comfort.
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
## 2022-02-22 NOTE — RT PROTOCOL NOTE
RT Inhaler-Nebulizer Bronchodilator Protocol Note    There is a bronchodilator order in the chart from a provider indicating to follow the RT Bronchodilator Protocol and there is an Initiate RT Inhaler-Nebulizer Bronchodilator Protocol order as well (see protocol at bottom of note). CXR Findings:  No results found. The findings from the last RT Protocol Assessment were as follows:   History Pulmonary Disease: Chronic pulmonary disease  Respiratory Pattern: Dyspnea on exertion or RR 21-25 bpm  Breath Sounds: Clear breath sounds  Cough: Strong, spontaneous, non-productive  Indication for Bronchodilator Therapy: On home bronchodilators  Bronchodilator Assessment Score: 4    Aerosolized bronchodilator medication orders have been revised according to the RT Inhaler-Nebulizer Bronchodilator Protocol below. Respiratory Therapist to perform RT Therapy Protocol Assessment initially then follow the protocol. Repeat RT Therapy Protocol Assessment PRN for score 0-3 or on second treatment, BID, and PRN for scores above 3. No Indications - adjust the frequency to every 6 hours PRN wheezing or bronchospasm, if no treatments needed after 48 hours then discontinue using Per Protocol order mode. If indication present, adjust the RT bronchodilator orders based on the Bronchodilator Assessment Score as indicated below. Use Inhaler orders unless patient has one or more of the following: on home nebulizer, not able to hold breath for 10 seconds, is not alert and oriented, cannot activate and use MDI correctly, or respiratory rate 25 breaths per minute or more, then use the equivalent nebulizer order(s) with same Frequency and PRN reasons based on the score. If a patient is on this medication at home then do not decrease Frequency below that used at home.     0-3 - enter or revise RT bronchodilator order(s) to equivalent RT Bronchodilator order with Frequency of every 4 hours PRN for wheezing or increased work of breathing using Per Protocol order mode. 4-6 - enter or revise RT Bronchodilator order(s) to two equivalent RT bronchodilator orders with one order with BID Frequency and one order with Frequency of every 4 hours PRN wheezing or increased work of breathing using Per Protocol order mode. 7-10 - enter or revise RT Bronchodilator order(s) to two equivalent RT bronchodilator orders with one order with TID Frequency and one order with Frequency of every 4 hours PRN wheezing or increased work of breathing using Per Protocol order mode. 11-13 - enter or revise RT Bronchodilator order(s) to one equivalent RT bronchodilator order with QID Frequency and an Albuterol order with Frequency of every 4 hours PRN wheezing or increased work of breathing using Per Protocol order mode. Greater than 13 - enter or revise RT Bronchodilator order(s) to one equivalent RT bronchodilator order with every 4 hours Frequency and an Albuterol order with Frequency of every 2 hours PRN wheezing or increased work of breathing using Per Protocol order mode. RT to enter RT Home Evaluation for COPD & MDI Assessment order using Per Protocol order mode.     Electronically signed by Meghna Pryor RCP on 2/22/2022 at 1:06 PM

## 2022-02-22 NOTE — DISCHARGE INSTR - COC
Continuity of Care Form    Patient Name: Harmeet Franco   :  1955  MRN:  [id number]    Admit date:  2022  Discharge date:  2022    Code Status Order: Full Code   Advance Directives:      Admitting Physician:  Tomeka Gtz MD  PCP: Camilo Duane, APRN - CNP    Discharging Nurse: Kathie Kirkpatrick [address] Unit/Room#: 4979/3792-37  Discharging Unit Phone Number: [phone]    Emergency Contact:   Extended Emergency Contact Information  Primary Emergency Contact: Shaila Paulino 81, Kris Ruiz [address] Phone: [phone]  Relation: Child   needed?  No  Secondary Emergency Contact: Fartun Baeza  Address: [address]  Home Phone: [phone]  Relation: Parent    Past Surgical History:  Past Surgical History:   Procedure Laterality Date    COLONOSCOPY  2015    hyperplastic polyp    COLONOSCOPY  2017    ESOPHAGECTOMY      cancer    FOOT DEBRIDEMENT Left 2021    I&D LEFT FOOT WITH REMOVAL OF NONVIABLE BONE AND SOFT TISSUE performed by Pari Bright DPM at [address] [place] [address] Left 2021    LEFT FOOT DEBRIDEMENT WITH REMOVAL ALL NON VIABLE SOFT TISSUE AND BONE performed by Pari Bright DPM at [address] [place] [address] Rd Right 2016    I & D heel    FOOT SURGERY Right 2016    I & D    FRACTURE SURGERY Left 2015    humerus left, left leg    HC CATH POWER PICC SINGLE  2021         IR INS PICC VAD W SQ PORT GREATER THAN 5  2020    IR INS PICC VAD W SQ PORT GREATER THAN 5 2020 [facility]    IR INS PICC VAD W SQ PORT GREATER THAN 5  2021    IR INS PICC VAD W SQ PORT GREATER THAN 5 2021 MD [facility]    IR INS PICC VAD W SQ PORT GREATER THAN 5  2021    IR INS PICC VAD W SQ PORT GREATER THAN 5 2021 MD [facility]    LEG AMPUTATION BELOW KNEE Right 2017    LEG AMPUTATION BELOW KNEE Left 2021    LEFT  LEG AMPUTATION BELOW KNEE performed by Nini Serna MD at [facility] Left 4/6/2021    STUMP DEBRIDEMENT INCISION AND DRAINAGE performed by Nini Serna MD at [address] Right 2014    rt 3rd through 5th digits    TOE AMPUTATION Left 5/26/2016    left foot 5th toe    TOE AMPUTATION Left 8/5/2020    FOOT TRANSMETATARSAL  AMPUTATION - AND LEFT PECUTANEOUS TENDO ACHILLES LENGTHENING performed by Woody Vaughn DPM at [address] Left 8/24/2020    REVISION  TRANSMETATARSAL AMPUTATION WITH DEBRIDEMENT. performed by Woody Vaughn DPM at [address] [facility]      5/14/13- with dilation    VASCULAR SURGERY Right 01/16/2017    foot guillotine amputation       Immunization History:   Immunization History   Administered Date(s) Administered    Influenza Vaccine, unspecified formulation 10/10/2016    Influenza Virus Vaccine 11/03/2014, 10/29/2015    Influenza, Shena Kerri, IM, (6 mo and older Fluzone, Flulaval, Fluarix and 3 yrs and older Afluria) 10/10/2016    Pneumococcal Polysaccharide ([id number]) 09/05/2012, 10/29/2015    Tdap (Boostrix, Adacel) 07/01/2019       Active Problems:  Patient Active Problem List   Diagnosis Code    Type 2 diabetes mellitus with diabetic polyneuropathy, with long-term current use of insulin ([facility]) E11.42, Z79.4    Neuropathic pain, leg M79.2    Diabetic polyneuropathy ([facility] [address] E11.42    Allergic rhinitis J30.9    Tobacco dependence F17.200    Edentulous K08.109    Dysphagia R13.10    Lung nodules R91.8    Esophageal cancer ([facility]) C15.9    Fracture of humerus, left, closed S42.302A    Closed fracture of humerus S42.309A    DM type 2 with diabetic peripheral neuropathy ([facility]) E11.42    COPD without exacerbation ([facility]) J44.9    HLD (hyperlipidemia) E78.5    Gastroesophageal reflux disease without esophagitis K21.9    Chronic pain syndrome G89.4    Marijuana use F12.90    History of colon polyps Z86.010    Functional diarrhea K59.1    Sepsis due to methicillin resistant Staphylococcus aureus (MRSA) ([facility]) A41.02    History of esophageal cancer Z85.01    Osteomyelitis, chronic, ankle or foot ([facility] [address] M86.679    PAD (peripheral artery disease) ([facility]) I73.9    Other pulmonary embolism without acute cor pulmonale ([facility]) I26.99    Carotid stenosis, asymptomatic, bilateral I65.23    Lower limb amputation status Z89.619    Fx humeral neck, right, closed, initial encounter S42.211A    Transient hypotension I95.9    Constipation due to opioid therapy K59.03, T40.2X5A    Acute kidney injury ([facility] [address] O60.8    Complication of below knee amputation stump ([facility]) T87.9    COPD exacerbation ([facility]) J44.1    Hyponatremia E87.1    Pain, phantom limb ([facility]) G54.6    S/P BKA (below knee amputation) bilateral ([facility]) Z89.512, Z89.511    Hyperglycemia R73.9    Moderate protein malnutrition ([facility]) E44.0    Essential hypertension I10    Uncontrolled type 2 diabetes mellitus with hyperglycemia ([facility]) E11.65    History of pulmonary embolism - 2017 Z86.711    Atelectasis J98.11    Uncontrolled type 2 diabetes mellitus with foot ulcer ([facility]) E11.621, L97.509, E11.65    Azotemia R79.89    Anemia, normocytic normochromic D64.9    Drug-induced constipation K59.03    Osteomyelitis of metatarsals of left foot ([facility]) M86.9    Diabetic foot infection ([facility]) E11.628, L08.9    Noncompliance Z91.19    Type 1 diabetes mellitus with diabetic foot infection ([facility]) E10.628, L08.9    Acute osteomyelitis of left foot ([facility]) M86.172    Cellulitis of left foot L03. 116    Mild malnutrition ([facility]) E44.1    Hypocalcemia E83.51    Hypokalemia E87.6    Moderate malnutrition ([facility]) E44.0    History of below knee amputation, right ([facility] [address] Z89.511    Foot ulcer with fat layer exposed, left ([facility] [address] L97.522    Chronic refractory osteomyelitis of left foot ([facility]) M86.672    Sepsis ([facility]) A41.9    Pyogenic inflammation of bone ([facility]) M86.9    Cellulitis of left lower extremity L03.116    History of below knee amputation, left ([facility]) Z89.512    Leg ulcer, left, with fat layer exposed ([facility] [address] L97.922    S/P amputation Z89.9    Tobacco abuse Z72.0    Pneumonia of right lower lobe due to infectious organism J18.9    Abdominal pain R10.9    Cannabis abuse F12.10    Parapneumonic effusion J18.9, J91.8    Hiatus hernia -large C50.6    Metabolic encephalopathy F51.02    Altered mental status R41.82    Hyperkalemia H38.3    Alcoholic intoxication without complication ([facility]) I84.350    Acute encephalopathy G93.40    Depression F32. A       Isolation/Infection:   Isolation            Contact          Patient Infection Status       Infection Onset Added Last Indicated Last Indicated By Review Planned Expiration Resolved Resolved By    MRSA 02/03/21 02/05/21 11/04/21 Culture, Anaerobic and Aerobic        2/2021 blood  Foot 4/3/21    VRE 08/24/20 08/27/20 08/24/20 Culture, Anaerobic and Aerobic        Foot - 12/2020    Resolved    C-diff Rule Out 11/24/21 11/25/21 11/25/21 C DIFF TOXIN/ANTIGEN (Ordered)   11/26/21 Rule-Out Test Resulted    COVID-19 (Rule Out) 11/03/21 11/03/21 11/03/21 COVID-19, Rapid (Ordered)   11/03/21 Rule-Out Test Resulted    COVID-19 (Rule Out) 08/29/21 08/29/21 08/29/21 Respiratory Panel, Molecular, with COVID-19 (Restricted: peds pts or suitable admitted adults) (Ordered)   08/30/21 Radha Sanders RN    COVID-19 (Rule Out) 08/29/21 08/29/21 08/29/21 COVID-19, Rapid (Ordered)   08/29/21 Rule-Out Test Resulted    C-diff Rule Out 05/25/21 05/25/21 05/25/21 C DIFF TOXIN/ANTIGEN (Ordered)   05/27/21 Patricia Parsons RN    C-diff Rule Out 01/11/21 01/11/21 01/11/21 C DIFF TOXIN/ANTIGEN (Ordered)   02/05/21 Anabel Cuellar RN    COVID-19 (Rule Out) 08/22/20 08/22/20 08/23/20 COVID-19 (Ordered)   08/23/20 Rule-Out Test Resulted    COVID-19 (Rule Out) 08/01/20 08/01/20 08/01/20 COVID-19 (Ordered)   08/02/20 Brandon Escobedo RN    Test discontinued - negative result this admission    MRSA  01/02/17 01/02/17 Brandon Escobedo RN   07/31/20 Brandon Escobedo RN 2 negative nasal screen - 2020  Foot - 6/2018            Nurse Assessment:  Last Vital Signs: /62   Pulse 78   Temp 97.5 °F (36.4 °C) (Axillary)   Resp 16   Ht 6' 1\" (1.854 m)   Wt 150 lb 3.2 oz (68.1 kg)   SpO2 94%   BMI 19.82 kg/m²     Last documented pain score (0-10 scale): Pain Level: 0  Last Weight:   Wt Readings from Last 1 Encounters:   02/22/22 150 lb 3.2 oz (68.1 kg)     Mental Status:  oriented and alert    IV Access:  - None    Nursing Mobility/ADLs:  Walking   Dependent  Transfer  Dependent  Bathing  Dependent  Dressing  Dependent  Toileting  Dependent  Feeding  Independent  Med Admin  Independent  Med Delivery   whole    Wound Care Documentation and Therapy:    Wound 11/03/21 Leg Left BKA stump wound (Active)   Wound Image   02/25/22 1300   Wound Etiology Pressure Stage  3 02/25/22 1300   Dressing Status New dressing applied; Old drainage noted 02/25/22 1300   Wound Cleansed Cleansed with saline 02/25/22 1300   Dressing/Treatment Hydrating gel;Petroleum impregnated gauze;ABD;Roll gauze 02/25/22 1300   Dressing Change Due 02/26/22 02/25/22 1300   Wound Length (cm) 5.2 cm 02/25/22 1300   Wound Width (cm) 2.8 cm 02/25/22 1300   Wound Depth (cm) 0.2 cm 02/25/22 1300   Wound Surface Area (cm^2) 14.56 cm^2 02/25/22 1300   Wound Volume (cm^3) 2.912 cm^3 02/25/22 1300   Wound Assessment Pink/red;Granulation tissue 02/25/22 1300   Drainage Amount Small 02/25/22 1300   Drainage Description Serosanguinous 02/25/22 1300   Odor None 02/25/22 1300   Odalys-wound Assessment Dry/flaky 02/25/22 1300   Margins Attached edges; Defined edges 02/25/22 1300   Wound Thickness Description not for Pressure Injury Full thickness 02/25/22 1300   Number of days: 113       Wound Knee Posterior (Active)   Wound Image   02/25/22 1300   Wound Etiology Pressure Stage  3 02/25/22 1300   Dressing Status New dressing applied; Old drainage noted 02/25/22 1300   Wound Cleansed Cleansed with saline 02/25/22 1300 Dressing/Treatment Barrier film;Hydrating gel;Petroleum impregnated gauze;ABD;Roll gauze 02/25/22 1300   Dressing Change Due 02/26/22 02/25/22 1300   Wound Length (cm) 2 cm 02/25/22 1300   Wound Width (cm) 2.5 cm 02/25/22 1300   Wound Depth (cm) 0.2 cm 02/25/22 1300   Wound Surface Area (cm^2) 5 cm^2 02/25/22 1300   Wound Volume (cm^3) 1 cm^3 02/25/22 1300   Wound Assessment Pale granulation tissue;Pink/red 02/25/22 1300   Drainage Amount Scant 02/25/22 1300   Drainage Description Serosanguinous 02/25/22 1300   Odor None 02/25/22 1300   Odalys-wound Assessment Hyperkeratosis (callous) 02/25/22 1300   Margins Attached edges; Defined edges 02/25/22 1300   Wound Thickness Description not for Pressure Injury Full thickness 02/25/22 1300   Number of days:        -Right posterior knee and left BKA stump wound care:  cleanse with saline, pat dry. Apply wound gel to cover wound beds. Cover with oil emulsion dressing (Adaptic). Place a foam over the right posterior knee and gauze to hold the left leg wound. Change dressings daily. Once the wound and edges are no longer dry, return to using collagen dressing daily. Elimination:  Continence: Bowel: Yes  Bladder: Yes  Urinary Catheter: None   Colostomy/Ileostomy/Ileal Conduit: No       Date of Last BM: 2/25/2022    Intake/Output Summary (Last 24 hours) at 2/22/2022 1710  Last data filed at 2/22/2022 0417  Gross per 24 hour   Intake 600 ml   Output 1450 ml   Net -850 ml     I/O last 3 completed shifts: In: 300 [P.O.:300]  Out: 1750 [Urine:1750]    Safety Concerns:      At Risk for Falls    Impairments/Disabilities:      Amputation - bilateral BKA    Nutrition Therapy:  Current Nutrition Therapy:   - Oral Diet:  General, Carb Control 4 carbs/meal (1800kcals/day), Low Fat, and low cholesterol, high fiber, no added salt    Routes of Feeding: Oral  Liquids: No Restrictions  Daily Fluid Restriction: no  Last Modified Barium Swallow with Video (Video Swallowing Test): not done    Treatments at the Time of Hospital Discharge:   Respiratory Treatments: see mar   Oxygen Therapy:  is not on home oxygen therapy. Ventilator:    - No ventilator support    Rehab Therapies: Physical Therapy, Occupational Therapy, and Speech/Language Therapy  Weight Bearing Status/Restrictions: No weight bearing restirctions  Other Medical Equipment (for information only, NOT a DME order):  wheelchair, walker, bath bench, and RLE prosthetic   Other Treatments:   Skilled RN assessment     Patient's personal belongings (please select all that are sent with patient):  None    RN SIGNATURE:  Electronically signed by Macy Orourke RN on 2/25/22 at 4:28 PM EST    CASE MANAGEMENT/SOCIAL WORK SECTION    Inpatient Status Date: 2/19    Readmission Risk Assessment Score:  Readmission Risk              Risk of Unplanned Readmission:  51           Discharging to Facility/ Agency   Name: Gadiel Fox  [id number]  [place] [address] [place]     Dialysis Facility (if applicable)   Name:  Address:  Dialysis Schedule:  Phone:  Fax:    / signature: Electronically signed by Claude Bunkers, MSW, EJW on 2/25/22 at 4:09 PM EST    PHYSICIAN SECTION    Prognosis: Fair    Condition at Discharge: Stable    Rehab Potential (if transferring to Rehab): Fair    Recommended Labs or Other Treatments After Discharge: PT OT , wound care . DSD left leg stump wound. Physician Certification: I certify the above information and transfer of Che Quintero  is necessary for the continuing treatment of the diagnosis listed and that he requires [facility] for less 30 days.    Update Admission H&P: No change in H&P    PHYSICIAN SIGNATURE:  Electronically signed by Caroline Rico MD on 2/24/22 at 2:10 PM EST
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
## 2022-02-22 NOTE — RT PROTOCOL NOTE
RT Inhaler-Nebulizer Bronchodilator Protocol Note    There is a bronchodilator order in the chart from a provider indicating to follow the RT Bronchodilator Protocol and there is an Initiate RT Inhaler-Nebulizer Bronchodilator Protocol order as well (see protocol at bottom of note). CXR Findings:  No results found. The findings from the last RT Protocol Assessment were as follows:   History Pulmonary Disease: Chronic pulmonary disease  Respiratory Pattern: Dyspnea on exertion or RR 21-25 bpm  Breath Sounds: Clear breath sounds  Cough: Strong, spontaneous, non-productive  Indication for Bronchodilator Therapy: On home bronchodilators  Bronchodilator Assessment Score: 4    Aerosolized bronchodilator medication orders have been revised according to the RT Inhaler-Nebulizer Bronchodilator Protocol below. Respiratory Therapist to perform RT Therapy Protocol Assessment initially then follow the protocol. Repeat RT Therapy Protocol Assessment PRN for score 0-3 or on second treatment, BID, and PRN for scores above 3. No Indications - adjust the frequency to every 6 hours PRN wheezing or bronchospasm, if no treatments needed after 48 hours then discontinue using Per Protocol order mode. If indication present, adjust the RT bronchodilator orders based on the Bronchodilator Assessment Score as indicated below. Use Inhaler orders unless patient has one or more of the following: on home nebulizer, not able to hold breath for 10 seconds, is not alert and oriented, cannot activate and use MDI correctly, or respiratory rate 25 breaths per minute or more, then use the equivalent nebulizer order(s) with same Frequency and PRN reasons based on the score. If a patient is on this medication at home then do not decrease Frequency below that used at home.     0-3 - enter or revise RT bronchodilator order(s) to equivalent RT Bronchodilator order with Frequency of every 4 hours PRN for wheezing or increased work of breathing using Per Protocol order mode. 4-6 - enter or revise RT Bronchodilator order(s) to two equivalent RT bronchodilator orders with one order with BID Frequency and one order with Frequency of every 4 hours PRN wheezing or increased work of breathing using Per Protocol order mode. 7-10 - enter or revise RT Bronchodilator order(s) to two equivalent RT bronchodilator orders with one order with TID Frequency and one order with Frequency of every 4 hours PRN wheezing or increased work of breathing using Per Protocol order mode. 11-13 - enter or revise RT Bronchodilator order(s) to one equivalent RT bronchodilator order with QID Frequency and an Albuterol order with Frequency of every 4 hours PRN wheezing or increased work of breathing using Per Protocol order mode. Greater than 13 - enter or revise RT Bronchodilator order(s) to one equivalent RT bronchodilator order with every 4 hours Frequency and an Albuterol order with Frequency of every 2 hours PRN wheezing or increased work of breathing using Per Protocol order mode. RT to enter RT Home Evaluation for COPD & MDI Assessment order using Per Protocol order mode.     Electronically signed by Dani Guido RCP on 2/21/2022 at 8:35 PM

## 2022-02-22 NOTE — PROGRESS NOTES
Comprehensive Nutrition Assessment    Type and Reason for Visit:  Initial,Positive Nutrition Screen (34 lb or > weight loss, decreased appetite, malnutrition score:5 ; difficulty chewing and swallowing food; diarrhea)    Nutrition Recommendations/Plan:   1. Continue current ADULT DIET; Regular; 4 carb choices (60 gm/meal); Low Fat/Low Chol/High Fiber/BRIJESH; 1800 ml  2. Patient declined ONS at this time   3. Monitor po intakes, weights and labs     Nutrition Assessment:  Patient admission r/t AMS and acute encephalopathy. Admit dx: hyperglycemia and acute alcoholic intoxication without complication. Hx: bilateral BKA. Pt reports weighing 220# \"years ago\". He said 6 months ago he dropped down to 135/140# and now is 150-160#. Hes consuming % of meals and is asking for snacks. Pt mentioned needing phone  for his phone- informed lead RN. Pt reports difficulty chewing d/t having no teeth - he does not want chopped/minced meals. Pt does not want ONS at this time. Continue current diet. Malnutrition Assessment:  Malnutrition Status: At risk for malnutrition (Comment)    Context:  Chronic Illness     Findings of the 6 clinical characteristics of malnutrition:  Energy Intake:  Mild decrease in energy intake (Comment)  Weight Loss:  1 - Mild weight loss (specify amount and time period)     Body Fat Loss:  Unable to assess     Muscle Mass Loss:  Unable to assess    Fluid Accumulation:  No significant fluid accumulation     Strength:  Not Performed    Estimated Daily Nutrient Needs:  Energy (kcal):  2040- 2176 kcal (30-32 kcal/kg); Weight Used for Energy Requirements:  Current     Protein (g):  100-110 gm protein (1.5-1.6 g/kg); Weight Used for Protein Requirements:  Current        Fluid (ml/day):  1800 mL fluid restriction; Method Used for Fluid Requirements:  Other (Comment)      Nutrition Related Findings:  Constipation- liquid stool 2/20. No edema.       Wounds:  None       Current Nutrition Therapies: ADULT DIET; Regular; 4 carb choices (60 gm/meal); Low Fat/Low Chol/High Fiber/BRIJESH; 1800 ml    Anthropometric Measures:  · Height: 6' 1\" (185.4 cm)  · Current Body Weight: 150 lb 3.2 oz (68.1 kg)   · Admission Body Weight: 150 lb (68 kg)      · Ideal Body Weight: 184 lbs; % Ideal Body Weight 81.6 %   · BMI: 19.8  · Adjusted Body Weight: 167.9;  Amputation   · Adjusted BMI: 22.1    · BMI Categories: Underweight (BMI less than 22) age over 72       Nutrition Diagnosis:   · Inadequate protein-energy intake related to inadequate protein-energy intake as evidenced by poor intake prior to Ge Controls loss,diarrhea,poor dentition    Nutrition Interventions:   Food and/or Nutrient Delivery:  Continue Current Diet  Nutrition Education/Counseling:  Education not indicated   Coordination of Nutrition Care:  Continue to monitor while inpatient    Goals:  PO intakes to provide >75% of estimated nutritional needs       Nutrition Monitoring and Evaluation:   Behavioral-Environmental Outcomes:  None Identified   Food/Nutrient Intake Outcomes:  Food and Nutrient Intake  Physical Signs/Symptoms Outcomes:  Biochemical Data,Chewing or Swallowing,Diarrhea,GI Status,Weight,Skin     Discharge Planning:    Continue current diet     Huy Gilmore, [address] [place] [address],   Office Number: [phone]
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
## 2022-02-23 LAB
GLUCOSE BLD-MCNC: 147 MG/DL (ref 75–110)
GLUCOSE BLD-MCNC: 222 MG/DL (ref 75–110)
GLUCOSE BLD-MCNC: 231 MG/DL (ref 75–110)

## 2022-02-23 PROCEDURE — 2580000003 HC RX 258: Performed by: NURSE PRACTITIONER

## 2022-02-23 PROCEDURE — 97110 THERAPEUTIC EXERCISES: CPT

## 2022-02-23 PROCEDURE — 6370000000 HC RX 637 (ALT 250 FOR IP): Performed by: FAMILY MEDICINE

## 2022-02-23 PROCEDURE — 6370000000 HC RX 637 (ALT 250 FOR IP): Performed by: NURSE PRACTITIONER

## 2022-02-23 PROCEDURE — 6370000000 HC RX 637 (ALT 250 FOR IP): Performed by: INTERNAL MEDICINE

## 2022-02-23 PROCEDURE — 99232 SBSQ HOSP IP/OBS MODERATE 35: CPT | Performed by: FAMILY MEDICINE

## 2022-02-23 PROCEDURE — 97530 THERAPEUTIC ACTIVITIES: CPT

## 2022-02-23 PROCEDURE — 82947 ASSAY GLUCOSE BLOOD QUANT: CPT

## 2022-02-23 PROCEDURE — 1200000000 HC SEMI PRIVATE

## 2022-02-23 PROCEDURE — 94640 AIRWAY INHALATION TREATMENT: CPT

## 2022-02-23 PROCEDURE — 94761 N-INVAS EAR/PLS OXIMETRY MLT: CPT

## 2022-02-23 PROCEDURE — 97535 SELF CARE MNGMENT TRAINING: CPT

## 2022-02-23 RX ORDER — LOPERAMIDE HYDROCHLORIDE 2 MG/1
2 CAPSULE ORAL 4 TIMES DAILY PRN
Status: DISCONTINUED | OUTPATIENT
Start: 2022-02-23 | End: 2022-02-26 | Stop reason: HOSPADM

## 2022-02-23 RX ORDER — PREGABALIN 100 MG/1
100 CAPSULE ORAL 3 TIMES DAILY
Status: DISCONTINUED | OUTPATIENT
Start: 2022-02-23 | End: 2022-02-26 | Stop reason: HOSPADM

## 2022-02-23 RX ORDER — PANTOPRAZOLE SODIUM 40 MG/1
40 TABLET, DELAYED RELEASE ORAL DAILY
Status: DISCONTINUED | OUTPATIENT
Start: 2022-02-23 | End: 2022-02-26 | Stop reason: HOSPADM

## 2022-02-23 RX ORDER — OXYCODONE HYDROCHLORIDE AND ACETAMINOPHEN 5; 325 MG/1; MG/1
1 TABLET ORAL EVERY 8 HOURS PRN
Status: DISPENSED | OUTPATIENT
Start: 2022-02-23 | End: 2022-02-25

## 2022-02-23 RX ADMIN — METOPROLOL TARTRATE 25 MG: 25 TABLET, FILM COATED ORAL at 20:41

## 2022-02-23 RX ADMIN — OXYCODONE AND ACETAMINOPHEN 1 TABLET: 5; 325 TABLET ORAL at 18:13

## 2022-02-23 RX ADMIN — METOPROLOL TARTRATE 25 MG: 25 TABLET, FILM COATED ORAL at 09:59

## 2022-02-23 RX ADMIN — LOPERAMIDE HYDROCHLORIDE 2 MG: 2 CAPSULE ORAL at 09:58

## 2022-02-23 RX ADMIN — OXYCODONE AND ACETAMINOPHEN 1 TABLET: 5; 325 TABLET ORAL at 09:59

## 2022-02-23 RX ADMIN — FAMOTIDINE 20 MG: 20 TABLET ORAL at 09:59

## 2022-02-23 RX ADMIN — LOPERAMIDE HYDROCHLORIDE 2 MG: 2 CAPSULE ORAL at 18:18

## 2022-02-23 RX ADMIN — INSULIN LISPRO 6 UNITS: 100 INJECTION, SOLUTION INTRAVENOUS; SUBCUTANEOUS at 18:13

## 2022-02-23 RX ADMIN — SODIUM CHLORIDE, PRESERVATIVE FREE 10 ML: 5 INJECTION INTRAVENOUS at 20:41

## 2022-02-23 RX ADMIN — FERROUS SULFATE TAB EC 325 MG (65 MG FE EQUIVALENT) 325 MG: 325 (65 FE) TABLET DELAYED RESPONSE at 20:41

## 2022-02-23 RX ADMIN — PROBIOTIC PRODUCT - TAB 1 TABLET: TAB at 09:59

## 2022-02-23 RX ADMIN — APIXABAN 5 MG: 5 TABLET, FILM COATED ORAL at 20:41

## 2022-02-23 RX ADMIN — PROBIOTIC PRODUCT - TAB 1 TABLET: TAB at 20:40

## 2022-02-23 RX ADMIN — APIXABAN 5 MG: 5 TABLET, FILM COATED ORAL at 09:58

## 2022-02-23 RX ADMIN — INSULIN GLARGINE 25 UNITS: 100 INJECTION, SOLUTION SUBCUTANEOUS at 20:41

## 2022-02-23 RX ADMIN — ATORVASTATIN CALCIUM 10 MG: 10 TABLET, FILM COATED ORAL at 20:41

## 2022-02-23 RX ADMIN — LOPERAMIDE HYDROCHLORIDE 2 MG: 2 CAPSULE ORAL at 00:44

## 2022-02-23 RX ADMIN — PREGABALIN 75 MG: 75 CAPSULE ORAL at 14:10

## 2022-02-23 RX ADMIN — MULTIPLE VITAMINS W/ MINERALS TAB 1 TABLET: TAB at 09:59

## 2022-02-23 RX ADMIN — PREGABALIN 75 MG: 75 CAPSULE ORAL at 09:58

## 2022-02-23 RX ADMIN — FERROUS SULFATE TAB EC 325 MG (65 MG FE EQUIVALENT) 325 MG: 325 (65 FE) TABLET DELAYED RESPONSE at 09:59

## 2022-02-23 RX ADMIN — PREGABALIN 100 MG: 100 CAPSULE ORAL at 20:41

## 2022-02-23 RX ADMIN — PANTOPRAZOLE SODIUM 40 MG: 40 TABLET, DELAYED RELEASE ORAL at 18:13

## 2022-02-23 RX ADMIN — TAMSULOSIN HYDROCHLORIDE 0.4 MG: 0.4 CAPSULE ORAL at 20:41

## 2022-02-23 RX ADMIN — INSULIN LISPRO 3 UNITS: 100 INJECTION, SOLUTION INTRAVENOUS; SUBCUTANEOUS at 10:01

## 2022-02-23 RX ADMIN — ALBUTEROL SULFATE 2 PUFF: 90 AEROSOL, METERED RESPIRATORY (INHALATION) at 09:58

## 2022-02-23 RX ADMIN — ALOGLIPTIN 25 MG: 25 TABLET, FILM COATED ORAL at 09:59

## 2022-02-23 RX ADMIN — Medication 100 MG: at 09:58

## 2022-02-23 RX ADMIN — INSULIN LISPRO 3 UNITS: 100 INJECTION, SOLUTION INTRAVENOUS; SUBCUTANEOUS at 12:43

## 2022-02-23 RX ADMIN — SODIUM CHLORIDE, PRESERVATIVE FREE 10 ML: 5 INJECTION INTRAVENOUS at 10:00

## 2022-02-23 RX ADMIN — INSULIN LISPRO 3 UNITS: 100 INJECTION, SOLUTION INTRAVENOUS; SUBCUTANEOUS at 20:41

## 2022-02-23 RX ADMIN — ASPIRIN 81 MG: 81 TABLET, COATED ORAL at 09:58

## 2022-02-23 ASSESSMENT — ENCOUNTER SYMPTOMS
SINUS PRESSURE: 0
NAUSEA: 0
BACK PAIN: 1
VOMITING: 0
VOICE CHANGE: 0
CHEST TIGHTNESS: 0
DIARRHEA: 0
RHINORRHEA: 0
ABDOMINAL PAIN: 0
CHOKING: 0
COUGH: 0
WHEEZING: 0
SHORTNESS OF BREATH: 0
CONSTIPATION: 0

## 2022-02-23 ASSESSMENT — PAIN DESCRIPTION - LOCATION
LOCATION: LEG
LOCATION: LEG

## 2022-02-23 ASSESSMENT — PAIN DESCRIPTION - FREQUENCY
FREQUENCY: CONTINUOUS
FREQUENCY: CONTINUOUS

## 2022-02-23 ASSESSMENT — PAIN SCALES - GENERAL
PAINLEVEL_OUTOF10: 7
PAINLEVEL_OUTOF10: 7

## 2022-02-23 ASSESSMENT — PAIN DESCRIPTION - PAIN TYPE
TYPE: CHRONIC PAIN
TYPE: CHRONIC PAIN

## 2022-02-23 ASSESSMENT — PAIN DESCRIPTION - PROGRESSION
CLINICAL_PROGRESSION: NOT CHANGED
CLINICAL_PROGRESSION: NOT CHANGED

## 2022-02-23 ASSESSMENT — PAIN DESCRIPTION - ONSET
ONSET: ON-GOING
ONSET: ON-GOING

## 2022-02-23 ASSESSMENT — PAIN DESCRIPTION - DESCRIPTORS
DESCRIPTORS: CONSTANT
DESCRIPTORS: CONSTANT

## 2022-02-23 ASSESSMENT — PAIN DESCRIPTION - ORIENTATION
ORIENTATION: RIGHT;LEFT
ORIENTATION: RIGHT;LEFT

## 2022-02-23 NOTE — PLAN OF CARE
Problem: Pain:  Goal: Control of chronic pain  Description: Control of chronic pain  Outcome: Ongoing  Note: Patient's pain well controlled with ordered pain medications and alternate therapies      Problem: Skin Integrity:  Goal: Will show no infection signs and symptoms  Description: Will show no infection signs and symptoms  Outcome: Ongoing     Problem: Skin Integrity:  Goal: Absence of new skin breakdown  Description: Absence of new skin breakdown  Outcome: Ongoing     Problem: Falls - Risk of:  Goal: Will remain free from falls  Description: Will remain free from falls  Outcome: Ongoing  Note: Patient is a fall risk during this admission. Fall risk assessment was performed. Patient is absent of falls. Bed is in the lowest position. Wheels on the bed are locked. Call light and bed side table are within reach. Clutter is removed. Patient was educated to call out when needing assistance or wanting to get out of bed. Patient offered toileting assistance during rounding. Hourly rounds have been performed. Problem: Serum Glucose Level - Abnormal:  Goal: Ability to maintain appropriate glucose levels will improve  Description: Ability to maintain appropriate glucose levels will improve  Outcome: Ongoing  Note: Patient's blood sugars continue to be checked before meals and before bed. Sliding scale insulin available for blood sugars 140 or greater. Physician updated as needed.       Problem: Discharge Planning:  Goal: Discharged to appropriate level of care  Description: Discharged to appropriate level of care  Outcome: Ongoing     Problem: Fluid Volume - Deficit:  Goal: Absence of fluid volume deficit signs and symptoms  Description: Absence of fluid volume deficit signs and symptoms  Outcome: Ongoing     Problem: Nutrition Deficit:  Goal: Ability to achieve adequate nutritional intake will improve  Description: Ability to achieve adequate nutritional intake will improve  Outcome: Ongoing     Problem: Sleep Pattern Disturbance:  Goal: Appears well-rested  Description: Appears well-rested  Outcome: Ongoing     Problem: Violence - Risk of, Self/Other-Directed:  Goal: Knowledge of developmental care interventions  Description: Absence of violence  Outcome: Ongoing
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
## 2022-02-23 NOTE — RT PROTOCOL NOTE
reRT Inhaler-Nebulizer Bronchodilator Protocol Note    There is a bronchodilator order in the chart from a provider indicating to follow the RT Bronchodilator Protocol and there is an Initiate RT Inhaler-Nebulizer Bronchodilator Protocol order as well (see protocol at bottom of note). CXR Findings:  No results found. The findings from the last RT Protocol Assessment were as follows:   History Pulmonary Disease: Chronic pulmonary disease  Respiratory Pattern: Regular pattern and RR 12-20 bpm  Breath Sounds: Clear breath sounds  Cough: Strong, spontaneous, non-productive  Indication for Bronchodilator Therapy: On home bronchodilators  Bronchodilator Assessment Score: 2    Aerosolized bronchodilator medication orders have been revised according to the RT Inhaler-Nebulizer Bronchodilator Protocol below. Respiratory Therapist to perform RT Therapy Protocol Assessment initially then follow the protocol. Repeat RT Therapy Protocol Assessment PRN for score 0-3 or on second treatment, BID, and PRN for scores above 3. No Indications - adjust the frequency to every 6 hours PRN wheezing or bronchospasm, if no treatments needed after 48 hours then discontinue using Per Protocol order mode. If indication present, adjust the RT bronchodilator orders based on the Bronchodilator Assessment Score as indicated below. Use Inhaler orders unless patient has one or more of the following: on home nebulizer, not able to hold breath for 10 seconds, is not alert and oriented, cannot activate and use MDI correctly, or respiratory rate 25 breaths per minute or more, then use the equivalent nebulizer order(s) with same Frequency and PRN reasons based on the score. If a patient is on this medication at home then do not decrease Frequency below that used at home.     0-3 - enter or revise RT bronchodilator order(s) to equivalent RT Bronchodilator order with Frequency of every 4 hours PRN for wheezing or increased work of breathing using Per Protocol order mode. 4-6 - enter or revise RT Bronchodilator order(s) to two equivalent RT bronchodilator orders with one order with BID Frequency and one order with Frequency of every 4 hours PRN wheezing or increased work of breathing using Per Protocol order mode. 7-10 - enter or revise RT Bronchodilator order(s) to two equivalent RT bronchodilator orders with one order with TID Frequency and one order with Frequency of every 4 hours PRN wheezing or increased work of breathing using Per Protocol order mode. 11-13 - enter or revise RT Bronchodilator order(s) to one equivalent RT bronchodilator order with QID Frequency and an Albuterol order with Frequency of every 4 hours PRN wheezing or increased work of breathing using Per Protocol order mode. Greater than 13 - enter or revise RT Bronchodilator order(s) to one equivalent RT bronchodilator order with every 4 hours Frequency and an Albuterol order with Frequency of every 2 hours PRN wheezing or increased work of breathing using Per Protocol order mode. RT to enter RT Home Evaluation for COPD & MDI Assessment order using Per Protocol order mode.     Electronically signed by Christiano Rice RCP on 2/23/2022 at 10:28 AM
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
## 2022-02-24 LAB
GLUCOSE BLD-MCNC: 136 MG/DL (ref 75–110)
GLUCOSE BLD-MCNC: 216 MG/DL (ref 75–110)
GLUCOSE BLD-MCNC: 221 MG/DL (ref 75–110)
GLUCOSE BLD-MCNC: 234 MG/DL (ref 75–110)
GLUCOSE BLD-MCNC: 238 MG/DL (ref 75–110)
GLUCOSE BLD-MCNC: 255 MG/DL (ref 75–110)

## 2022-02-24 PROCEDURE — 6370000000 HC RX 637 (ALT 250 FOR IP): Performed by: NURSE PRACTITIONER

## 2022-02-24 PROCEDURE — 2580000003 HC RX 258: Performed by: NURSE PRACTITIONER

## 2022-02-24 PROCEDURE — 82947 ASSAY GLUCOSE BLOOD QUANT: CPT

## 2022-02-24 PROCEDURE — 97110 THERAPEUTIC EXERCISES: CPT

## 2022-02-24 PROCEDURE — 6370000000 HC RX 637 (ALT 250 FOR IP): Performed by: INTERNAL MEDICINE

## 2022-02-24 PROCEDURE — 97535 SELF CARE MNGMENT TRAINING: CPT

## 2022-02-24 PROCEDURE — 1200000000 HC SEMI PRIVATE

## 2022-02-24 PROCEDURE — 6370000000 HC RX 637 (ALT 250 FOR IP): Performed by: FAMILY MEDICINE

## 2022-02-24 PROCEDURE — 99232 SBSQ HOSP IP/OBS MODERATE 35: CPT | Performed by: FAMILY MEDICINE

## 2022-02-24 RX ORDER — THIAMINE MONONITRATE (VIT B1) 100 MG
100 TABLET ORAL DAILY
Qty: 30 TABLET | Refills: 0 | Status: SHIPPED | OUTPATIENT
Start: 2022-02-25 | End: 2022-03-24

## 2022-02-24 RX ORDER — INSULIN GLARGINE 100 [IU]/ML
30 INJECTION, SOLUTION SUBCUTANEOUS NIGHTLY
Status: DISCONTINUED | OUTPATIENT
Start: 2022-02-24 | End: 2022-02-26 | Stop reason: HOSPADM

## 2022-02-24 RX ADMIN — OXYCODONE AND ACETAMINOPHEN 1 TABLET: 5; 325 TABLET ORAL at 08:58

## 2022-02-24 RX ADMIN — LOPERAMIDE HYDROCHLORIDE 2 MG: 2 CAPSULE ORAL at 00:12

## 2022-02-24 RX ADMIN — PREGABALIN 100 MG: 100 CAPSULE ORAL at 20:17

## 2022-02-24 RX ADMIN — INSULIN LISPRO 5 UNITS: 100 INJECTION, SOLUTION INTRAVENOUS; SUBCUTANEOUS at 20:16

## 2022-02-24 RX ADMIN — TAMSULOSIN HYDROCHLORIDE 0.4 MG: 0.4 CAPSULE ORAL at 20:18

## 2022-02-24 RX ADMIN — PROBIOTIC PRODUCT - TAB 1 TABLET: TAB at 08:58

## 2022-02-24 RX ADMIN — INSULIN LISPRO 6 UNITS: 100 INJECTION, SOLUTION INTRAVENOUS; SUBCUTANEOUS at 12:25

## 2022-02-24 RX ADMIN — HYDROXYZINE HYDROCHLORIDE 25 MG: 25 TABLET, FILM COATED ORAL at 23:00

## 2022-02-24 RX ADMIN — APIXABAN 5 MG: 5 TABLET, FILM COATED ORAL at 20:19

## 2022-02-24 RX ADMIN — Medication 100 MG: at 08:58

## 2022-02-24 RX ADMIN — PREGABALIN 100 MG: 100 CAPSULE ORAL at 08:58

## 2022-02-24 RX ADMIN — SODIUM CHLORIDE, PRESERVATIVE FREE 10 ML: 5 INJECTION INTRAVENOUS at 20:18

## 2022-02-24 RX ADMIN — INSULIN LISPRO 6 UNITS: 100 INJECTION, SOLUTION INTRAVENOUS; SUBCUTANEOUS at 17:45

## 2022-02-24 RX ADMIN — HYDROXYZINE HYDROCHLORIDE 25 MG: 25 TABLET, FILM COATED ORAL at 00:12

## 2022-02-24 RX ADMIN — ASPIRIN 81 MG: 81 TABLET, COATED ORAL at 08:58

## 2022-02-24 RX ADMIN — ATORVASTATIN CALCIUM 10 MG: 10 TABLET, FILM COATED ORAL at 20:20

## 2022-02-24 RX ADMIN — MULTIPLE VITAMINS W/ MINERALS TAB 1 TABLET: TAB at 08:58

## 2022-02-24 RX ADMIN — FERROUS SULFATE TAB EC 325 MG (65 MG FE EQUIVALENT) 325 MG: 325 (65 FE) TABLET DELAYED RESPONSE at 08:58

## 2022-02-24 RX ADMIN — OXYCODONE AND ACETAMINOPHEN 1 TABLET: 5; 325 TABLET ORAL at 17:53

## 2022-02-24 RX ADMIN — PREGABALIN 100 MG: 100 CAPSULE ORAL at 14:18

## 2022-02-24 RX ADMIN — ALOGLIPTIN 25 MG: 25 TABLET, FILM COATED ORAL at 09:01

## 2022-02-24 RX ADMIN — SODIUM CHLORIDE, PRESERVATIVE FREE 10 ML: 5 INJECTION INTRAVENOUS at 08:59

## 2022-02-24 RX ADMIN — INSULIN GLARGINE 30 UNITS: 100 INJECTION, SOLUTION SUBCUTANEOUS at 20:16

## 2022-02-24 RX ADMIN — PANTOPRAZOLE SODIUM 40 MG: 40 TABLET, DELAYED RELEASE ORAL at 06:25

## 2022-02-24 RX ADMIN — FERROUS SULFATE TAB EC 325 MG (65 MG FE EQUIVALENT) 325 MG: 325 (65 FE) TABLET DELAYED RESPONSE at 20:17

## 2022-02-24 RX ADMIN — METOPROLOL TARTRATE 25 MG: 25 TABLET, FILM COATED ORAL at 20:18

## 2022-02-24 RX ADMIN — APIXABAN 5 MG: 5 TABLET, FILM COATED ORAL at 08:58

## 2022-02-24 RX ADMIN — PROBIOTIC PRODUCT - TAB 1 TABLET: TAB at 20:18

## 2022-02-24 ASSESSMENT — PAIN SCALES - GENERAL
PAINLEVEL_OUTOF10: 0
PAINLEVEL_OUTOF10: 7
PAINLEVEL_OUTOF10: 8
PAINLEVEL_OUTOF10: 5
PAINLEVEL_OUTOF10: 7
PAINLEVEL_OUTOF10: 4
PAINLEVEL_OUTOF10: 7

## 2022-02-24 ASSESSMENT — ENCOUNTER SYMPTOMS
ABDOMINAL PAIN: 0
VOMITING: 0
VOICE CHANGE: 0
CONSTIPATION: 0
RHINORRHEA: 0
CHEST TIGHTNESS: 0
COUGH: 0
SINUS PRESSURE: 0
CHOKING: 0
BACK PAIN: 1
SHORTNESS OF BREATH: 0
DIARRHEA: 0
WHEEZING: 0
NAUSEA: 0

## 2022-02-24 ASSESSMENT — PAIN DESCRIPTION - PAIN TYPE: TYPE: CHRONIC PAIN

## 2022-02-24 NOTE — PLAN OF CARE
Problem: Pain:  Goal: Pain level will decrease  Description: Pain level will decrease  2/24/2022 0120 by Ronen Howell RN  Outcome: Ongoing     Problem: Pain:  Goal: Control of chronic pain  Description: Control of chronic pain  2/24/2022 0120 by Ronen Howell RN  Outcome: Ongoing     Problem: Skin Integrity:  Goal: Will show no infection signs and symptoms  Description: Will show no infection signs and symptoms  2/24/2022 0120 by Ronen Howell RN  Outcome: Ongoing     Problem: Skin Integrity:  Goal: Absence of new skin breakdown  Description: Absence of new skin breakdown  2/24/2022 0120 by Ronen Howell RN  Outcome: Ongoing     Problem: Falls - Risk of:  Goal: Will remain free from falls  Description: Will remain free from falls  2/24/2022 0120 by Ronen Howell RN  Outcome: Ongoing
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
## 2022-02-24 NOTE — PROGRESS NOTES
Writer into patients room to redress patients leg wounds. Patient demanding that writer applies blue cap lotion/hand cream to open wounds. Writer explains to patient that this is not a medicated ointment and would not be able to just apply without permission from the physician. Patient becomes verbally aggressive with writer stating, Timo Laureano f**kendrick other nurse has done it. You guys are all liars. You said wound care would be in today and never came. I hate this f**cking place\". Writer once again attempted to explain to patient that nurse cannot just apply lotion to open leg wound until further orders were given and patient states, \"Just don't f**cking touch it\". Writer left room at this time. Wounds are currently PJ and wound care was consulted at 14 023  as order was placed at 1622 and wound care had already rounded at [address] earlier on in shift.

## 2022-02-24 NOTE — PROGRESS NOTES
Physical Therapy  DATE: 2022    NAME: Tanvi Tinoco  MRN: [id number]   : 1955    Patient not seen this date for Physical Therapy due to:      [] Cancel by RN or physician due to:    [] Hemodialysis    [] Critical Lab Value Level     [] Blood transfusion in progress    [] Acute or unstable cardiovascular status   _MAP < 55 or more than >115  _HR < 40 or > 130    [] Acute or unstable pulmonary status   -FiO2 > 60%   _RR < 5 or >40    _O2 sats < 85%    [] Strict Bedrest    [] Off Unit for surgery or procedure    [] Off Unit for testing       [] Pending imaging to R/O fracture    [x] Refusal by Patient States just had OT and worked on UE and that is PT and he is too tired to participate further this date. [] Other      [] PT being discontinued at this time. Patient independent. No further needs. [] PT being discontinued at this time as the patient has been transferred to hospice care. No further needs.       Familia Car, PTA
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
## 2022-02-24 NOTE — PLAN OF CARE
Problem: Pain:  Goal: Control of chronic pain  Description: Control of chronic pain. Pain assessment completed. Patient demonstrates understanding of pain rating scale and interventions. At this time patient states pain is well controlled. Will continue to monitor and reassess with each rounding and PRN. Outcome: Ongoing     Problem: Skin Integrity:  Goal: Will show no infection signs and symptoms  Description: Will show no infection signs and symptoms  Outcome: Ongoing     Problem: Falls - Risk of:  Goal: Will remain free from falls  Description: Will remain free from falls. Fall risk assessment completed. Patient instructed to use call light. Bed locked and in lowest position, side rails up 2/4, call light and bedside table within reach, clutter removed, and non-skid footwear on when pt out of bed. Hourly rounds will continue. Bed alarm in use.    Outcome: Ongoing

## 2022-02-24 NOTE — FLOWSHEET NOTE
[facility] NOTE    Room # 1010/1010-02   Name: Jess Marin            Buddhist: [facility]     Reason for visit: Follow up    I visited the patient. Admit Date & Time: 2/19/2022  6:44 PM    Assessment:  Jess Marin is a 61 y.o. male in the hospital because MRSA. The patient was sitting in his chair taking a drink. Intervention:  I introduced myself and my title as  The patient declined the visit at this time. Outcome:  Patient declined visit. Plan:  Chaplains will remain available to offer spiritual and emotional support as needed.     Electronically signed by Ean Damian on 2/24/2022 at 3:36 PM.  [place]       02/24/22 1515   Encounter Summary   Services provided to: Patient   Referral/Consult From: [facility]   (02/24/2022)   Complexity of Encounter Low   Length of Encounter 15 minutes   Spiritual Assessment Completed Yes   Routine   Type Follow up   Assessment Approachable   Outcome Refused/declined

## 2022-02-25 VITALS
TEMPERATURE: 97.7 F | HEIGHT: 73 IN | WEIGHT: 151.6 LBS | SYSTOLIC BLOOD PRESSURE: 123 MMHG | BODY MASS INDEX: 20.09 KG/M2 | HEART RATE: 80 BPM | RESPIRATION RATE: 16 BRPM | DIASTOLIC BLOOD PRESSURE: 69 MMHG | OXYGEN SATURATION: 97 %

## 2022-02-25 LAB
GLUCOSE BLD-MCNC: 146 MG/DL (ref 75–110)
GLUCOSE BLD-MCNC: 220 MG/DL (ref 75–110)
GLUCOSE BLD-MCNC: 244 MG/DL (ref 75–110)
GLUCOSE BLD-MCNC: 273 MG/DL (ref 75–110)

## 2022-02-25 PROCEDURE — 99213 OFFICE O/P EST LOW 20 MIN: CPT

## 2022-02-25 PROCEDURE — 99239 HOSP IP/OBS DSCHRG MGMT >30: CPT | Performed by: FAMILY MEDICINE

## 2022-02-25 PROCEDURE — 6370000000 HC RX 637 (ALT 250 FOR IP): Performed by: NURSE PRACTITIONER

## 2022-02-25 PROCEDURE — 6370000000 HC RX 637 (ALT 250 FOR IP): Performed by: FAMILY MEDICINE

## 2022-02-25 PROCEDURE — 2580000003 HC RX 258: Performed by: NURSE PRACTITIONER

## 2022-02-25 PROCEDURE — 6370000000 HC RX 637 (ALT 250 FOR IP): Performed by: INTERNAL MEDICINE

## 2022-02-25 PROCEDURE — 1200000000 HC SEMI PRIVATE

## 2022-02-25 PROCEDURE — 82947 ASSAY GLUCOSE BLOOD QUANT: CPT

## 2022-02-25 RX ORDER — BACITRACIN 500 [USP'U]/G
OINTMENT TOPICAL 4 TIMES DAILY
Status: DISCONTINUED | OUTPATIENT
Start: 2022-02-25 | End: 2022-02-26 | Stop reason: HOSPADM

## 2022-02-25 RX ADMIN — INSULIN LISPRO 3 UNITS: 100 INJECTION, SOLUTION INTRAVENOUS; SUBCUTANEOUS at 12:36

## 2022-02-25 RX ADMIN — FERROUS SULFATE TAB EC 325 MG (65 MG FE EQUIVALENT) 325 MG: 325 (65 FE) TABLET DELAYED RESPONSE at 21:13

## 2022-02-25 RX ADMIN — SODIUM CHLORIDE, PRESERVATIVE FREE 10 ML: 5 INJECTION INTRAVENOUS at 08:31

## 2022-02-25 RX ADMIN — TAMSULOSIN HYDROCHLORIDE 0.4 MG: 0.4 CAPSULE ORAL at 21:13

## 2022-02-25 RX ADMIN — INSULIN LISPRO 9 UNITS: 100 INJECTION, SOLUTION INTRAVENOUS; SUBCUTANEOUS at 08:31

## 2022-02-25 RX ADMIN — PANTOPRAZOLE SODIUM 40 MG: 40 TABLET, DELAYED RELEASE ORAL at 06:01

## 2022-02-25 RX ADMIN — ZINC OXIDE: 200 OINTMENT TOPICAL at 17:36

## 2022-02-25 RX ADMIN — MULTIPLE VITAMINS W/ MINERALS TAB 1 TABLET: TAB at 08:30

## 2022-02-25 RX ADMIN — PREGABALIN 100 MG: 100 CAPSULE ORAL at 08:30

## 2022-02-25 RX ADMIN — ZINC OXIDE: 200 OINTMENT TOPICAL at 21:17

## 2022-02-25 RX ADMIN — FERROUS SULFATE TAB EC 325 MG (65 MG FE EQUIVALENT) 325 MG: 325 (65 FE) TABLET DELAYED RESPONSE at 08:30

## 2022-02-25 RX ADMIN — APIXABAN 5 MG: 5 TABLET, FILM COATED ORAL at 08:30

## 2022-02-25 RX ADMIN — LOPERAMIDE HYDROCHLORIDE 2 MG: 2 CAPSULE ORAL at 14:26

## 2022-02-25 RX ADMIN — OXYCODONE AND ACETAMINOPHEN 1 TABLET: 5; 325 TABLET ORAL at 10:40

## 2022-02-25 RX ADMIN — PROBIOTIC PRODUCT - TAB 1 TABLET: TAB at 08:30

## 2022-02-25 RX ADMIN — ATORVASTATIN CALCIUM 10 MG: 10 TABLET, FILM COATED ORAL at 21:13

## 2022-02-25 RX ADMIN — ASPIRIN 81 MG: 81 TABLET, COATED ORAL at 08:30

## 2022-02-25 RX ADMIN — ALOGLIPTIN 25 MG: 25 TABLET, FILM COATED ORAL at 08:31

## 2022-02-25 RX ADMIN — PREGABALIN 100 MG: 100 CAPSULE ORAL at 21:13

## 2022-02-25 RX ADMIN — PROBIOTIC PRODUCT - TAB 1 TABLET: TAB at 21:13

## 2022-02-25 RX ADMIN — METOPROLOL TARTRATE 25 MG: 25 TABLET, FILM COATED ORAL at 08:30

## 2022-02-25 RX ADMIN — INSULIN LISPRO 3 UNITS: 100 INJECTION, SOLUTION INTRAVENOUS; SUBCUTANEOUS at 21:14

## 2022-02-25 RX ADMIN — APIXABAN 5 MG: 5 TABLET, FILM COATED ORAL at 21:14

## 2022-02-25 RX ADMIN — Medication 100 MG: at 08:30

## 2022-02-25 RX ADMIN — INSULIN LISPRO 6 UNITS: 100 INJECTION, SOLUTION INTRAVENOUS; SUBCUTANEOUS at 17:36

## 2022-02-25 RX ADMIN — PREGABALIN 100 MG: 100 CAPSULE ORAL at 14:26

## 2022-02-25 RX ADMIN — INSULIN GLARGINE 30 UNITS: 100 INJECTION, SOLUTION SUBCUTANEOUS at 21:14

## 2022-02-25 RX ADMIN — METOPROLOL TARTRATE 25 MG: 25 TABLET, FILM COATED ORAL at 21:14

## 2022-02-25 RX ADMIN — DOCUSATE SODIUM 100 MG: 100 CAPSULE, LIQUID FILLED ORAL at 08:30

## 2022-02-25 RX ADMIN — SODIUM CHLORIDE, PRESERVATIVE FREE 10 ML: 5 INJECTION INTRAVENOUS at 21:14

## 2022-02-25 ASSESSMENT — ENCOUNTER SYMPTOMS
SINUS PRESSURE: 0
ABDOMINAL PAIN: 0
NAUSEA: 0
VOMITING: 0
BACK PAIN: 1
DIARRHEA: 0
WHEEZING: 0
SHORTNESS OF BREATH: 0
COUGH: 0
CHEST TIGHTNESS: 0
CONSTIPATION: 0
VOICE CHANGE: 0
RHINORRHEA: 0
CHOKING: 0

## 2022-02-25 ASSESSMENT — PAIN SCALES - GENERAL
PAINLEVEL_OUTOF10: 6
PAINLEVEL_OUTOF10: 8

## 2022-02-25 ASSESSMENT — PAIN DESCRIPTION - LOCATION: LOCATION: LEG

## 2022-02-25 ASSESSMENT — PAIN DESCRIPTION - PROGRESSION: CLINICAL_PROGRESSION: NOT CHANGED

## 2022-02-25 ASSESSMENT — PAIN DESCRIPTION - ORIENTATION: ORIENTATION: RIGHT;LEFT

## 2022-02-25 ASSESSMENT — PAIN DESCRIPTION - DESCRIPTORS: DESCRIPTORS: CONSTANT

## 2022-02-25 ASSESSMENT — PAIN DESCRIPTION - ONSET: ONSET: ON-GOING

## 2022-02-25 ASSESSMENT — PAIN DESCRIPTION - FREQUENCY: FREQUENCY: CONTINUOUS

## 2022-02-25 ASSESSMENT — PAIN - FUNCTIONAL ASSESSMENT: PAIN_FUNCTIONAL_ASSESSMENT: ACTIVITIES ARE NOT PREVENTED

## 2022-02-25 ASSESSMENT — PAIN DESCRIPTION - PAIN TYPE: TYPE: CHRONIC PAIN
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
## 2022-02-25 NOTE — PROGRESS NOTES
Nutrition Note    Dietitian Consult: Real Score     Comment: Pressure injury- patient requesting high protein supplement shake    PO intakes % all meals. Added Ensure High Protein 2x/d due to low CHO amt and higher protein needs for pressure injury.  F/U date set for 2/28 unless notified earlier     Sean Quinn, [address] [place] [address]  Office Number: [phone]
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
## 2022-02-25 NOTE — PLAN OF CARE
Problem: Pain:  Goal: Pain level will decrease  Description: Pain level will decrease  Outcome: Ongoing     Problem: Pain:  Goal: Control of chronic pain  Description: Control of chronic pain  2/25/2022 0255 by Ezra Arteaga RN  Outcome: Ongoing     Problem: Skin Integrity:  Goal: Will show no infection signs and symptoms  Description: Will show no infection signs and symptoms  2/25/2022 0255 by Ezra Arteaga RN  Outcome: Ongoing     Problem: Skin Integrity:  Goal: Absence of new skin breakdown  Description: Absence of new skin breakdown  Outcome: Ongoing     Problem: Falls - Risk of:  Goal: Will remain free from falls  Description: Will remain free from falls  2/25/2022 0255 by Ezra Arteaga RN  Outcome: Ongoing

## 2022-02-25 NOTE — PROGRESS NOTES
Physical Therapy  DATE: 2022    NAME: Sveta Trent  MRN: [id number]   : 1955    Patient not seen this date for Physical Therapy due to:      [] Cancel by RN or physician due to:    [] Hemodialysis    [] Critical Lab Value Level     [] Blood transfusion in progress    [] Acute or unstable cardiovascular status   _MAP < 55 or more than >115  _HR < 40 or > 130    [] Acute or unstable pulmonary status   -FiO2 > 60%   _RR < 5 or >40    _O2 sats < 85%    [] Strict Bedrest    [] Off Unit for surgery or procedure    [] Off Unit for testing       [] Pending imaging to R/O fracture    [x] Refusal by Patient (Patient refused PT treatment today reporting he has been having BMs all night and wants to sleep. Given extensive education about participation and importance of mobility to decrease likelihood of skin breakdown. Patient became agitated)      [] Other      [] PT being discontinued at this time. Patient independent. No further needs. [] PT being discontinued at this time as the patient has been transferred to hospice care. No further needs.       Mary Lui, PTA

## 2022-02-25 NOTE — CARE COORDINATION
Discharge Planning:    Call placed to [facility] with Fela Perez to inform of patient's admission. Fela Perez is unable to accept patient at discharge due to inability to accept patient's insurance. [id number] accidentally accepted previously and learned they are not contracted with [facility] Medicare. Unfortunately, patient has utilized several HC agency's in the [place] area who are unable/unwilling to accept at discharge. Patient will need to look at other options for discharge, whether it is SNF/ARU/Home with family assistance.
Discharge planning    Chart reviewed. SS working on placement but met with many denials. Reviewed epic and patient has had MED 1 but unable to accept back due to insurance     Referrals sent to [facility] and [facility] to see if can accept insurance.  Will follow along with SS>     Denied by [facility] on their \"do not take back list \"
Social work: Met with patient and informed him [facility] denied. He is interested in SNF placement, choice given of [facility] where he went in December. SW informed him he needs to participate with therapy in order for SNF to get approved. Referral sent.
Social work: Spoke with [facility], they are considering patient, await final decision from their medical director.
Social work: [facility] declined. [facility] can accept and submit for precert.
Social work: [facility] requesting additional clinicals for precert. SW faxed to [facility] to submit for precert. Patient updated.
Social work: [facility]Deja garcia. Referrals to [facility] and [facility].
Social work: hens done for [employer]. Await precert.
Social work; spoke to [facility] from [employer]. Insurance has not yet granted Ellena Rubinstein. They are still asking for auth. Will need philip, Rx and discharge order for snf. Sav camacho    Social work: got auth for [facility]. Setting up discharge if any ambulances are available in the area today. If not will do for am. Will need philip, Rx and discharge order for snf. Hens is done. Phone for report: [phone]  Fax: [phone].  Sav camacho
had many concerns. Pt was discharged from [facility] to [address] on 12/2/21 he developed covid and was transferred to [facility] for rehab. He was then discharged home 1/11/22 with [facility] care vns. There were some miscommunications and problems with [facility] insurance and [employer] and he was dropped abruptly from [facility] services she then spoke with the head of their company and he was reinstated but then dropped again [place] issues. According to dtr [facility] recommended he get intense rehab and  they were going to refer him to [facility] but dtr has not heard anything. She is also concerned pt did not get his insulin pen and it was supposed to be delivered from [employer]. She prefers pt make a decision on dc plan. She eventually would like him to move to [place] with her. She had also arranged for private pay hha thru [facility] however her 1/2 sister had some miscommunication and incidentally canceled service before they could start. Jerel Jackson will call to try to get back. Jerel Jackson relays pt lives home alone but his dad, sister and niece visit frequently and hep with any needs. Discussed with pt he agrees to ARU [facility] or which ever is in network. He is cooperative with care/discussion. He relays he only drank one beer. He does have a prosthetic for right leg-he is mainly wc bound for now. He is able to pivot himself on/off toilet. If he is not approved for acute rehab-he plans on going home. Will need to call [facility] in am (they stopped services about 1 week ago per dtr) to find out where they are with referral to [facility] and will need to re-establish [facility] agency in network if pt goes home. Dtr relays someone from [facility] [phone] did see more recently. Arturo Gibbs WILL NEED SIGNED.            Electronically signed by Chan Hall RN on 2/20/22 at 1:47 PM EST

## 2022-02-26 NOTE — PROGRESS NOTES
Patient discharged via [facility] to [facility]. resport called to charge RN. All belongings and paperwork taken with patient.

## 2022-02-26 NOTE — PLAN OF CARE
Problem: Pain:  Goal: Pain level will decrease  Description: Pain level will decrease  2/25/2022 2330 by Brenda Nation RN  Outcome: Completed  2/25/2022 1116 by Jorge L Anand RN  Outcome: Ongoing  Note: Pain level assessment complete. Patient educated on pain scale and control interventions  PRN pain medication given per patient request  Patient instructed to call out with new onset of pain or unrelieved pain    Goal: Control of acute pain  Description: Control of acute pain  2/25/2022 2330 by Brenda Nation RN  Outcome: Completed  2/25/2022 1116 by Jorge L Anand RN  Outcome: Ongoing  Goal: Control of chronic pain  Description: Control of chronic pain  2/25/2022 2330 by Brenda Nation RN  Outcome: Completed  2/25/2022 1116 by Jorge L Anand RN  Outcome: Ongoing     Problem: Skin Integrity:  Goal: Will show no infection signs and symptoms  Description: Will show no infection signs and symptoms  2/25/2022 2330 by Brenda Nation RN  Outcome: Completed  2/25/2022 1116 by Jorge L Anand RN  Outcome: Ongoing  Goal: Absence of new skin breakdown  Description: Absence of new skin breakdown  2/25/2022 2330 by Brenda Nation RN  Outcome: Completed  2/25/2022 1116 by Jorge L Anand RN  Outcome: Ongoing     Problem: Falls - Risk of:  Goal: Will remain free from falls  Description: Will remain free from falls  2/25/2022 2330 by Brenda Nation RN  Outcome: Completed  2/25/2022 1116 by Jorge L Anand RN  Outcome: Ongoing  Note: Siderails up x 2  Hourly rounding  Call light in reach  Instructed to call for assist before attempting out of bed.   Remains free from falls and accidental injury at this time   Floor free from obstacles  Bed is locked and in lowest position  Adequate lighting provided  Bed alarm on, [place] star and Stay with Me signs posted      Goal: Absence of physical injury  Description: Absence of physical injury  2/25/2022 2330 by Brenda Nation RN  Outcome: Completed  2/25/2022 1116 by Jorge L Anand RN  Outcome: Ongoing Problem: Nutrition  Goal: Optimal nutrition therapy  2/25/2022 2330 by Ann Burns RN  Outcome: Completed  2/25/2022 1116 by Klaus Villalpando RN  Outcome: Ongoing     Problem: Serum Glucose Level - Abnormal:  Goal: Ability to maintain appropriate glucose levels will improve  Description: Ability to maintain appropriate glucose levels will improve  2/25/2022 2330 by Ann Burns RN  Outcome: Completed  2/25/2022 1116 by Klaus Villalpando RN  Outcome: Ongoing     Problem: Discharge Planning:  Goal: Discharged to appropriate level of care  Description: Discharged to appropriate level of care  2/25/2022 2330 by Ann Burns RN  Outcome: Completed  2/25/2022 1116 by Klaus Villalpando RN  Outcome: Ongoing     Problem: Fluid Volume - Deficit:  Goal: Absence of fluid volume deficit signs and symptoms  Description: Absence of fluid volume deficit signs and symptoms  2/25/2022 2330 by Ann Burns RN  Outcome: Completed  2/25/2022 1116 by Klaus Villalpando RN  Outcome: Ongoing     Problem: Nutrition Deficit:  Goal: Ability to achieve adequate nutritional intake will improve  Description: Ability to achieve adequate nutritional intake will improve  2/25/2022 2330 by Ann Burns RN  Outcome: Completed  2/25/2022 1116 by Klaus Villalpando RN  Outcome: Ongoing     Problem: Sleep Pattern Disturbance:  Goal: Appears well-rested  Description: Appears well-rested  2/25/2022 2330 by Ann Burns RN  Outcome: Completed  2/25/2022 1116 by Klaus Villalpando RN  Outcome: Ongoing     Problem: Violence - Risk of, Self/Other-Directed:  Goal: Knowledge of developmental care interventions  Description: Absence of violence  2/25/2022 2330 by Ann Burns RN  Outcome: Completed  2/25/2022 1116 by Klaus Villalpando RN  Outcome: Ongoing
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
## 2022-03-24 ENCOUNTER — TELEPHONE (OUTPATIENT)
Dept: FAMILY MEDICINE CLINIC | Age: 67
End: 2022-03-24

## 2022-03-24 ENCOUNTER — OFFICE VISIT (OUTPATIENT)
Dept: FAMILY MEDICINE CLINIC | Age: 67
End: 2022-03-24
Payer: MEDICARE

## 2022-03-24 VITALS
SYSTOLIC BLOOD PRESSURE: 108 MMHG | BODY MASS INDEX: 20 KG/M2 | DIASTOLIC BLOOD PRESSURE: 62 MMHG | HEIGHT: 73 IN | HEART RATE: 111 BPM | TEMPERATURE: 97.1 F | OXYGEN SATURATION: 97 %

## 2022-03-24 DIAGNOSIS — J44.9 COPD WITHOUT EXACERBATION (HCC): ICD-10-CM

## 2022-03-24 DIAGNOSIS — K21.9 GASTROESOPHAGEAL REFLUX DISEASE WITHOUT ESOPHAGITIS: ICD-10-CM

## 2022-03-24 DIAGNOSIS — I10 ESSENTIAL HYPERTENSION: ICD-10-CM

## 2022-03-24 DIAGNOSIS — D50.9 IRON DEFICIENCY ANEMIA, UNSPECIFIED IRON DEFICIENCY ANEMIA TYPE: ICD-10-CM

## 2022-03-24 DIAGNOSIS — Z91.81 AT HIGH RISK FOR FALLS: ICD-10-CM

## 2022-03-24 DIAGNOSIS — B37.2 YEAST DERMATITIS: ICD-10-CM

## 2022-03-24 DIAGNOSIS — A04.72 C. DIFFICILE DIARRHEA: ICD-10-CM

## 2022-03-24 DIAGNOSIS — I73.9 PAD (PERIPHERAL ARTERY DISEASE) (HCC): ICD-10-CM

## 2022-03-24 DIAGNOSIS — Z89.511 S/P BILATERAL BKA (BELOW KNEE AMPUTATION) (HCC): ICD-10-CM

## 2022-03-24 DIAGNOSIS — Z12.5 PROSTATE CANCER SCREENING: ICD-10-CM

## 2022-03-24 DIAGNOSIS — K21.9 GASTROESOPHAGEAL REFLUX DISEASE, UNSPECIFIED WHETHER ESOPHAGITIS PRESENT: ICD-10-CM

## 2022-03-24 DIAGNOSIS — G47.00 INSOMNIA, UNSPECIFIED TYPE: ICD-10-CM

## 2022-03-24 DIAGNOSIS — E11.42 DM TYPE 2 WITH DIABETIC PERIPHERAL NEUROPATHY (HCC): ICD-10-CM

## 2022-03-24 DIAGNOSIS — Z89.512 S/P BILATERAL BKA (BELOW KNEE AMPUTATION) (HCC): ICD-10-CM

## 2022-03-24 DIAGNOSIS — Z91.14 HX OF MEDICATION NONCOMPLIANCE: Primary | ICD-10-CM

## 2022-03-24 LAB
CHP ED QC CHECK: NORMAL
GLUCOSE BLD-MCNC: 125 MG/DL

## 2022-03-24 PROCEDURE — 99496 TRANSJ CARE MGMT HIGH F2F 7D: CPT | Performed by: NURSE PRACTITIONER

## 2022-03-24 PROCEDURE — 1111F DSCHRG MED/CURRENT MED MERGE: CPT | Performed by: NURSE PRACTITIONER

## 2022-03-24 PROCEDURE — 82962 GLUCOSE BLOOD TEST: CPT | Performed by: NURSE PRACTITIONER

## 2022-03-24 PROCEDURE — 3046F HEMOGLOBIN A1C LEVEL >9.0%: CPT | Performed by: NURSE PRACTITIONER

## 2022-03-24 RX ORDER — ALBUTEROL SULFATE 90 UG/1
2 AEROSOL, METERED RESPIRATORY (INHALATION) EVERY 6 HOURS PRN
Qty: 18 G | Refills: 5 | Status: SHIPPED | OUTPATIENT
Start: 2022-03-24

## 2022-03-24 RX ORDER — INSULIN DETEMIR 100 [IU]/ML
INJECTION, SOLUTION SUBCUTANEOUS
Qty: 10 ML | Refills: 3 | Status: SHIPPED | OUTPATIENT
Start: 2022-03-24 | End: 2022-06-21 | Stop reason: SDUPTHER

## 2022-03-24 RX ORDER — OXYCODONE HYDROCHLORIDE AND ACETAMINOPHEN 5; 325 MG/1; MG/1
1 TABLET ORAL EVERY 4 HOURS PRN
Status: ON HOLD | COMMUNITY
End: 2022-09-08

## 2022-03-24 RX ORDER — FERROUS SULFATE 325(65) MG
325 TABLET ORAL
Qty: 30 TABLET | Refills: 5 | Status: SHIPPED | OUTPATIENT
Start: 2022-03-24

## 2022-03-24 RX ORDER — HYDROXYZINE HYDROCHLORIDE 25 MG/1
25 TABLET, FILM COATED ORAL DAILY
Qty: 30 TABLET | Refills: 5 | Status: SHIPPED | OUTPATIENT
Start: 2022-03-24

## 2022-03-24 RX ORDER — FAMOTIDINE 20 MG/1
20 TABLET, FILM COATED ORAL 2 TIMES DAILY
Qty: 60 TABLET | Refills: 2 | Status: SHIPPED | OUTPATIENT
Start: 2022-03-24

## 2022-03-24 RX ORDER — ATORVASTATIN CALCIUM 10 MG/1
10 TABLET, FILM COATED ORAL NIGHTLY
Qty: 30 TABLET | Refills: 5 | Status: SHIPPED | OUTPATIENT
Start: 2022-03-24

## 2022-03-24 RX ORDER — BLOOD-GLUCOSE METER
1 KIT MISCELLANEOUS DAILY
Qty: 1 KIT | Refills: 0 | Status: SHIPPED | OUTPATIENT
Start: 2022-03-24

## 2022-03-24 RX ORDER — TAMSULOSIN HYDROCHLORIDE 0.4 MG/1
0.4 CAPSULE ORAL DAILY
COMMUNITY
End: 2022-06-21 | Stop reason: SDUPTHER

## 2022-03-24 RX ORDER — PREGABALIN 75 MG/1
75 CAPSULE ORAL 2 TIMES DAILY
Qty: 60 CAPSULE | Refills: 5 | Status: ON HOLD | OUTPATIENT
Start: 2022-03-24 | End: 2022-09-08

## 2022-03-24 RX ORDER — NYSTATIN 100000 U/G
CREAM TOPICAL
Qty: 30 G | Refills: 1 | Status: ON HOLD | OUTPATIENT
Start: 2022-03-24 | End: 2022-09-08

## 2022-03-24 RX ORDER — INSULIN ASPART 100 [IU]/ML
INJECTION, SOLUTION INTRAVENOUS; SUBCUTANEOUS
Qty: 5 PEN | Refills: 3 | Status: SHIPPED | OUTPATIENT
Start: 2022-03-24 | End: 2022-06-21 | Stop reason: SDUPTHER

## 2022-03-24 RX ORDER — BUDESONIDE AND FORMOTEROL FUMARATE DIHYDRATE 160; 4.5 UG/1; UG/1
2 AEROSOL RESPIRATORY (INHALATION) 2 TIMES DAILY
Qty: 1 EACH | Refills: 2 | Status: SHIPPED | OUTPATIENT
Start: 2022-03-24

## 2022-03-24 RX ORDER — LANCETS 30 GAUGE
1 EACH MISCELLANEOUS 3 TIMES DAILY
Qty: 600 EACH | Refills: 1 | Status: SHIPPED | OUTPATIENT
Start: 2022-03-24

## 2022-03-24 RX ORDER — CHOLECALCIFEROL (VITAMIN D3) 125 MCG
5 CAPSULE ORAL DAILY
COMMUNITY
End: 2022-06-21

## 2022-03-24 ASSESSMENT — PATIENT HEALTH QUESTIONNAIRE - PHQ9
SUM OF ALL RESPONSES TO PHQ QUESTIONS 1-9: 0
10. IF YOU CHECKED OFF ANY PROBLEMS, HOW DIFFICULT HAVE THESE PROBLEMS MADE IT FOR YOU TO DO YOUR WORK, TAKE CARE OF THINGS AT HOME, OR GET ALONG WITH OTHER PEOPLE: 0
SUM OF ALL RESPONSES TO PHQ QUESTIONS 1-9: 0
3. TROUBLE FALLING OR STAYING ASLEEP: 0
SUM OF ALL RESPONSES TO PHQ9 QUESTIONS 1 & 2: 0
8. MOVING OR SPEAKING SO SLOWLY THAT OTHER PEOPLE COULD HAVE NOTICED. OR THE OPPOSITE, BEING SO FIGETY OR RESTLESS THAT YOU HAVE BEEN MOVING AROUND A LOT MORE THAN USUAL: 0
SUM OF ALL RESPONSES TO PHQ QUESTIONS 1-9: 0
SUM OF ALL RESPONSES TO PHQ QUESTIONS 1-9: 0
6. FEELING BAD ABOUT YOURSELF - OR THAT YOU ARE A FAILURE OR HAVE LET YOURSELF OR YOUR FAMILY DOWN: 0
7. TROUBLE CONCENTRATING ON THINGS, SUCH AS READING THE NEWSPAPER OR WATCHING TELEVISION: 0
5. POOR APPETITE OR OVEREATING: 0
9. THOUGHTS THAT YOU WOULD BE BETTER OFF DEAD, OR OF HURTING YOURSELF: 0
4. FEELING TIRED OR HAVING LITTLE ENERGY: 0
2. FEELING DOWN, DEPRESSED OR HOPELESS: 0
1. LITTLE INTEREST OR PLEASURE IN DOING THINGS: 0

## 2022-03-24 ASSESSMENT — ENCOUNTER SYMPTOMS
DIARRHEA: 1
COUGH: 0
VOMITING: 0
SHORTNESS OF BREATH: 0

## 2022-03-24 NOTE — TELEPHONE ENCOUNTER
[facility] home health nurse Tor Singh contacted the office to inform provider that he feels as though patient is not safe being home alone. He also states that patients medications are a mess and he instructed patient to bring all medications with him to appointment today to sort out. States that patient only has about 3 of 20 medications actually in his home. Home health nurse would like to know if Jun Tai could contact him after appointment for some instruction. Please advise.
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
## 2022-03-24 NOTE — PATIENT INSTRUCTIONS
Patient Education        High Blood Pressure: Care Instructions  Overview     It's normal for blood pressure to go up and down throughout the day. But if it stays up, you have high blood pressure. Another name for high blood pressure is hypertension. Despite what a lot of people think, high blood pressure usually doesn't cause headaches or make you feel dizzy or lightheaded. It usually has no symptoms. But it does increase your risk of stroke, heart attack, and other problems. You and your doctor will talk about your risks of these problems based on your blood pressure. Your doctor will give you a goal for your blood pressure. Your goal will be based on your health and your age. Lifestyle changes, such as eating healthy and being active, are always important to help lower blood pressure. You might also take medicine to reach your blood pressure goal.  Follow-up care is a key part of your treatment and safety. Be sure to make and go to all appointments, and call your doctor if you are having problems. It's also a good idea to know your test results and keep a list of the medicines you take. How can you care for yourself at home? Medical treatment  · If you stop taking your medicine, your blood pressure will go back up. You may take one or more types of medicine to lower your blood pressure. Be safe with medicines. Take your medicine exactly as prescribed. Call your doctor if you think you are having a problem with your medicine. · Talk to your doctor before you start taking aspirin every day. Aspirin can help certain people lower their risk of a heart attack or stroke. But taking aspirin isn't right for everyone, because it can cause serious bleeding. · See your doctor regularly. You may need to see the doctor more often at first or until your blood pressure comes down.   · If you are taking blood pressure medicine, talk to your doctor before you take decongestants or anti-inflammatory medicine, such as ibuprofen. Some of these medicines can raise blood pressure. · Learn how to check your blood pressure at home. Lifestyle changes  · Stay at a healthy weight. This is especially important if you put on weight around the waist. Losing even 10 pounds can help you lower your blood pressure. · If your doctor recommends it, get more exercise. Walking is a good choice. Bit by bit, increase the amount you walk every day. Try for at least 30 minutes on most days of the week. You also may want to swim, bike, or do other activities. · Avoid or limit alcohol. Talk to your doctor about whether you can drink any alcohol. · Try to limit how much sodium you eat to less than 2,300 milligrams (mg) a day. Your doctor may ask you to try to eat less than 1,500 mg a day. · Eat plenty of fruits (such as bananas and oranges), vegetables, legumes, whole grains, and low-fat dairy products. · Lower the amount of saturated fat in your diet. Saturated fat is found in animal products such as milk, cheese, and meat. Limiting these foods may help you lose weight and also lower your risk for heart disease. · Do not smoke. Smoking increases your risk for heart attack and stroke. If you need help quitting, talk to your doctor about stop-smoking programs and medicines. These can increase your chances of quitting for good. When should you call for help? Call 911  anytime you think you may need emergency care. This may mean having symptoms that suggest that your blood pressure is causing a serious heart or blood vessel problem. Your blood pressure may be over 180/120. For example, call 911 if:    · You have symptoms of a heart attack. These may include:  ? Chest pain or pressure, or a strange feeling in the chest.  ? Sweating. ? Shortness of breath. ? Nausea or vomiting. ? Pain, pressure, or a strange feeling in the back, neck, jaw, or upper belly or in one or both shoulders or arms. ? Lightheadedness or sudden weakness.   ? A fast or irregular heartbeat.     · You have symptoms of a stroke. These may include:  ? Sudden numbness, tingling, weakness, or loss of movement in your face, arm, or leg, especially on only one side of your body. ? Sudden vision changes. ? Sudden trouble speaking. ? Sudden confusion or trouble understanding simple statements. ? Sudden problems with walking or balance. ? A sudden, severe headache that is different from past headaches.     · You have severe back or belly pain. Do not wait until your blood pressure comes down on its own. Get help right away. Call your doctor now or seek immediate care if:    · Your blood pressure is much higher than normal (such as 180/120 or higher), but you don't have symptoms.     · You think high blood pressure is causing symptoms, such as:  ? Severe headache.  ? Blurry vision. Watch closely for changes in your health, and be sure to contact your doctor if:    · Your blood pressure measures higher than your doctor recommends at least 2 times. That means the top number is higher or the bottom number is higher, or both.     · You think you may be having side effects from your blood pressure medicine. Where can you learn more? Go to https://Blink (air taxi)peKylin Networkeweb.[employer]. org and sign in to your Chain account. Enter H504 in the PlayBucks box to learn more about \"High Blood Pressure: Care Instructions. \"     If you do not have an account, please click on the \"Sign Up Now\" link. Current as of: April 29, 2021               Content Version: 13.1  © 2006-2021 Healthwise, Incorporated. Care instructions adapted under license by [facility] ([facility]). If you have questions about a medical condition or this instruction, always ask your healthcare professional. [place] [address] any warranty or liability for your use of this information.

## 2022-03-25 ENCOUNTER — CARE COORDINATION (OUTPATIENT)
Dept: CARE COORDINATION | Age: 67
End: 2022-03-25

## 2022-03-25 NOTE — CARE COORDINATION
Provider referral. Outreach to review care management.  LVM with contact information, requested call back

## 2022-04-06 ENCOUNTER — CARE COORDINATION (OUTPATIENT)
Dept: CARE COORDINATION | Age: 67
End: 2022-04-06

## 2022-04-21 ENCOUNTER — TELEPHONE (OUTPATIENT)
Dept: FAMILY MEDICINE CLINIC | Age: 67
End: 2022-04-21

## 2022-04-21 DIAGNOSIS — Z89.511 S/P BKA (BELOW KNEE AMPUTATION) BILATERAL (HCC): ICD-10-CM

## 2022-04-21 DIAGNOSIS — Z78.9 POOR HISTORIAN: ICD-10-CM

## 2022-04-21 DIAGNOSIS — Z89.512 S/P BKA (BELOW KNEE AMPUTATION) BILATERAL (HCC): ICD-10-CM

## 2022-04-21 DIAGNOSIS — Z91.81 AT HIGH RISK FOR FALLS: ICD-10-CM

## 2022-04-21 DIAGNOSIS — Z91.14 HX OF MEDICATION NONCOMPLIANCE: ICD-10-CM

## 2022-04-21 DIAGNOSIS — E11.42 DM TYPE 2 WITH DIABETIC PERIPHERAL NEUROPATHY (HCC): Primary | ICD-10-CM

## 2022-04-21 NOTE — TELEPHONE ENCOUNTER
----- Message from Jessica Gipson sent at 4/21/2022  1:29 PM EDT -----  Subject: Referral Request    QUESTIONS   Reason for referral request? Pt needs referral to [facility] office of   aging. Has the physician seen you for this condition before? No   Preferred Specialist (if applicable)? Do you already have an appointment scheduled? No  Additional Information for Provider? Pt left nursing home facility Tuesday   and spoke to [facility] about getting in home health   care. He was told referral had to come from doctor. Gave phone number   ([phone]). Was told that doctor would need to say \"[facility] office of   aging-ADRN for LTCC PP assessment\" to get proper services. Pt does have an   appt for may 3rd as well but was hoping this could be done sooner than   that.   ---------------------------------------------------------------------------  --------------  CALL BACK INFO  What is the best way for the office to contact you? OK to leave message on   voicemail, OK to respond with electronic message via Fnbox portal (only   for patients who have registered Fnbox account)  Preferred Call Back Phone Number? [phone]  ---------------------------------------------------------------------------  --------------  SCRIPT ANSWERS  Relationship to Patient? Other  Representative Name? Bipin Jacinto  Is the Representative on the appropriate HIPAA document in Epic?  Yes

## 2022-04-22 ENCOUNTER — TELEPHONE (OUTPATIENT)
Dept: FAMILY MEDICINE CLINIC | Age: 67
End: 2022-04-22

## 2022-04-22 NOTE — TELEPHONE ENCOUNTER
Feli Sanchez, a nurse with [employer] [facility], called stating that they received orders for the patient and he just has some concerns and would like for [facility] to call him at his earliest convenience to just discuss the patient. Please advise.     Feli Sanchez - [phone]

## 2022-04-23 ENCOUNTER — APPOINTMENT (OUTPATIENT)
Dept: GENERAL RADIOLOGY | Age: 67
End: 2022-04-23
Payer: MEDICARE

## 2022-04-23 ENCOUNTER — HOSPITAL ENCOUNTER (EMERGENCY)
Age: 67
Discharge: HOME | End: 2022-04-24
Attending: EMERGENCY MEDICINE
Payer: MEDICARE

## 2022-04-23 VITALS
HEIGHT: 73 IN | OXYGEN SATURATION: 93 % | SYSTOLIC BLOOD PRESSURE: 126 MMHG | WEIGHT: 160 LBS | BODY MASS INDEX: 21.2 KG/M2 | HEART RATE: 93 BPM | DIASTOLIC BLOOD PRESSURE: 59 MMHG | RESPIRATION RATE: 18 BRPM | TEMPERATURE: 98.2 F

## 2022-04-23 DIAGNOSIS — L03.113 CELLULITIS OF RIGHT UPPER EXTREMITY: Primary | ICD-10-CM

## 2022-04-23 DIAGNOSIS — R79.89 ELEVATED SERUM CREATININE: ICD-10-CM

## 2022-04-23 DIAGNOSIS — S61.209A OPEN WOUND OF FINGER, INITIAL ENCOUNTER: ICD-10-CM

## 2022-04-23 LAB
-: ABNORMAL
ABSOLUTE EOS #: 0.21 K/UL (ref 0–0.44)
ABSOLUTE IMMATURE GRANULOCYTE: 0.07 K/UL (ref 0–0.3)
ABSOLUTE LYMPH #: 2.05 K/UL (ref 1.1–3.7)
ABSOLUTE MONO #: 0.91 K/UL (ref 0.1–1.2)
ANION GAP SERPL CALCULATED.3IONS-SCNC: 13 MMOL/L (ref 9–17)
BACTERIA: ABNORMAL
BASOPHILS # BLD: 1 % (ref 0–2)
BASOPHILS ABSOLUTE: 0.11 K/UL (ref 0–0.2)
BILIRUBIN URINE: NEGATIVE
BUN BLDV-MCNC: 19 MG/DL (ref 8–23)
BUN/CREAT BLD: 9 (ref 9–20)
CALCIUM SERPL-MCNC: 9 MG/DL (ref 8.6–10.4)
CHLORIDE BLD-SCNC: 101 MMOL/L (ref 98–107)
CO2: 23 MMOL/L (ref 20–31)
COLOR: YELLOW
CREAT SERPL-MCNC: 2.02 MG/DL (ref 0.7–1.2)
EOSINOPHILS RELATIVE PERCENT: 2 % (ref 1–4)
EPITHELIAL CELLS UA: ABNORMAL /HPF (ref 0–5)
GFR AFRICAN AMERICAN: 41 ML/MIN
GFR NON-AFRICAN AMERICAN: 33 ML/MIN
GFR SERPL CREATININE-BSD FRML MDRD: ABNORMAL ML/MIN/{1.73_M2}
GLUCOSE BLD-MCNC: 178 MG/DL (ref 70–99)
GLUCOSE URINE: ABNORMAL
HCT VFR BLD CALC: 32.3 % (ref 40.7–50.3)
HEMOGLOBIN: 9.5 G/DL (ref 13–17)
IMMATURE GRANULOCYTES: 1 %
KETONES, URINE: ABNORMAL
LACTIC ACID, SEPSIS: 1 MMOL/L (ref 0.5–1.9)
LEUKOCYTE ESTERASE, URINE: NEGATIVE
LYMPHOCYTES # BLD: 17 % (ref 24–43)
MCH RBC QN AUTO: 26.5 PG (ref 25.2–33.5)
MCHC RBC AUTO-ENTMCNC: 29.4 G/DL (ref 28.4–34.8)
MCV RBC AUTO: 90 FL (ref 82.6–102.9)
MONOCYTES # BLD: 8 % (ref 3–12)
NITRITE, URINE: NEGATIVE
NRBC AUTOMATED: 0 PER 100 WBC
PDW BLD-RTO: 14.6 % (ref 11.8–14.4)
PH UA: 6 (ref 5–8)
PLATELET # BLD: 508 K/UL (ref 138–453)
PMV BLD AUTO: 9.8 FL (ref 8.1–13.5)
POTASSIUM SERPL-SCNC: 3.9 MMOL/L (ref 3.7–5.3)
PROTEIN UA: ABNORMAL
RBC # BLD: 3.59 M/UL (ref 4.21–5.77)
RBC # BLD: ABNORMAL 10*6/UL
RBC UA: ABNORMAL /HPF (ref 0–2)
SEG NEUTROPHILS: 71 % (ref 36–65)
SEGMENTED NEUTROPHILS ABSOLUTE COUNT: 8.8 K/UL (ref 1.5–8.1)
SODIUM BLD-SCNC: 137 MMOL/L (ref 135–144)
SPECIFIC GRAVITY UA: 1.03 (ref 1–1.03)
TURBIDITY: CLEAR
URINE HGB: NEGATIVE
UROBILINOGEN, URINE: NORMAL
WBC # BLD: 12.2 K/UL (ref 3.5–11.3)
WBC UA: ABNORMAL /HPF (ref 0–5)

## 2022-04-23 PROCEDURE — 6360000002 HC RX W HCPCS: Performed by: NURSE PRACTITIONER

## 2022-04-23 PROCEDURE — 96365 THER/PROPH/DIAG IV INF INIT: CPT

## 2022-04-23 PROCEDURE — 81001 URINALYSIS AUTO W/SCOPE: CPT

## 2022-04-23 PROCEDURE — 80048 BASIC METABOLIC PNL TOTAL CA: CPT

## 2022-04-23 PROCEDURE — 87077 CULTURE AEROBIC IDENTIFY: CPT

## 2022-04-23 PROCEDURE — 87070 CULTURE OTHR SPECIMN AEROBIC: CPT

## 2022-04-23 PROCEDURE — 87205 SMEAR GRAM STAIN: CPT

## 2022-04-23 PROCEDURE — 96366 THER/PROPH/DIAG IV INF ADDON: CPT

## 2022-04-23 PROCEDURE — 86403 PARTICLE AGGLUT ANTBDY SCRN: CPT

## 2022-04-23 PROCEDURE — 2580000003 HC RX 258: Performed by: NURSE PRACTITIONER

## 2022-04-23 PROCEDURE — 85025 COMPLETE CBC W/AUTO DIFF WBC: CPT

## 2022-04-23 PROCEDURE — 87040 BLOOD CULTURE FOR BACTERIA: CPT

## 2022-04-23 PROCEDURE — 73130 X-RAY EXAM OF HAND: CPT

## 2022-04-23 PROCEDURE — 36415 COLL VENOUS BLD VENIPUNCTURE: CPT

## 2022-04-23 PROCEDURE — 71045 X-RAY EXAM CHEST 1 VIEW: CPT

## 2022-04-23 PROCEDURE — 83605 ASSAY OF LACTIC ACID: CPT

## 2022-04-23 PROCEDURE — 87186 SC STD MICRODIL/AGAR DIL: CPT

## 2022-04-23 PROCEDURE — 99284 EMERGENCY DEPT VISIT MOD MDM: CPT

## 2022-04-23 RX ORDER — AMOXICILLIN AND CLAVULANATE POTASSIUM 875; 125 MG/1; MG/1
1 TABLET, FILM COATED ORAL 2 TIMES DAILY
Qty: 20 TABLET | Refills: 0 | Status: SHIPPED | OUTPATIENT
Start: 2022-04-23 | End: 2022-05-03

## 2022-04-23 RX ORDER — DOXYCYCLINE HYCLATE 100 MG
100 TABLET ORAL 2 TIMES DAILY
Qty: 20 TABLET | Refills: 0 | Status: SHIPPED | OUTPATIENT
Start: 2022-04-23 | End: 2022-05-03

## 2022-04-23 RX ORDER — SODIUM CHLORIDE 9 MG/ML
INJECTION, SOLUTION INTRAVENOUS CONTINUOUS
Status: DISCONTINUED | OUTPATIENT
Start: 2022-04-23 | End: 2022-04-24 | Stop reason: HOSPADM

## 2022-04-23 RX ADMIN — SODIUM CHLORIDE: 9 INJECTION, SOLUTION INTRAVENOUS at 20:30

## 2022-04-23 RX ADMIN — VANCOMYCIN HYDROCHLORIDE 2000 MG: 1 INJECTION, POWDER, LYOPHILIZED, FOR SOLUTION INTRAVENOUS at 20:55
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
## 2022-04-24 ENCOUNTER — HOSPITAL ENCOUNTER (EMERGENCY)
Age: 67
Discharge: LEFT BEFORE DISCHARGE | End: 2022-04-24

## 2022-04-24 PROCEDURE — 6370000000 HC RX 637 (ALT 250 FOR IP): Performed by: STUDENT IN AN ORGANIZED HEALTH CARE EDUCATION/TRAINING PROGRAM

## 2022-04-24 RX ORDER — ONDANSETRON 4 MG/1
4 TABLET, ORALLY DISINTEGRATING ORAL ONCE
Status: COMPLETED | OUTPATIENT
Start: 2022-04-24 | End: 2022-04-24

## 2022-04-24 RX ADMIN — ONDANSETRON 4 MG: 4 TABLET, ORALLY DISINTEGRATING ORAL at 00:17

## 2022-04-24 ASSESSMENT — ENCOUNTER SYMPTOMS
BACK PAIN: 0
SHORTNESS OF BREATH: 1
COUGH: 0
VOMITING: 0
COLOR CHANGE: 1
DIARRHEA: 0
NAUSEA: 0
ABDOMINAL PAIN: 0

## 2022-04-26 ENCOUNTER — CARE COORDINATION (OUTPATIENT)
Dept: CARE COORDINATION | Age: 67
End: 2022-04-26

## 2022-04-26 ENCOUNTER — TELEPHONE (OUTPATIENT)
Dept: FAMILY MEDICINE CLINIC | Age: 67
End: 2022-04-26

## 2022-04-26 LAB
CULTURE: ABNORMAL
CULTURE: ABNORMAL
DIRECT EXAM: ABNORMAL
SPECIMEN DESCRIPTION: ABNORMAL

## 2022-04-26 NOTE — CARE COORDINATION
Spoke to Beth. Reports, Joel Reese is inpatient at [facility] had surgery today and being treated for an infection. He is still inpatient and unsure of a discharge date. Beth, lives in [place], and wants him to go there for SNF/rehab and recovery. Reports, she'd like him close, she can help with his care and make sure he's following up with appointments.  She has already talked the hospital , his surgeon and his insurance

## 2022-04-26 NOTE — TELEPHONE ENCOUNTER
Yvonne Landa from [facility] contacted the office and states that she received call from patients sister. Sister states that she doesn't feel as though patient is safe living alone. Nas Thibodeaux states that she has made several attempts to see patient but patient states that he has appointments and that home health can't come because he will not be home. Patient told home nurse that he had appointment with PCP's office today 4/26 and that they were unable to come today.

## 2022-04-26 NOTE — CARE COORDINATION
Outreach to patient to review care management services and enrollment.  LVM message with contact information and requested call back    LVM message with [facility], asking for an update, also for care coordination / what services are involved in his care       LVM  Message with Tab Jiang / [id number], asking for an update

## 2022-04-26 NOTE — CARE COORDINATION
Spoke to lorettar/Marisela, pt's agreeable to go [place] to be close to his children and grandchildren.    Rasheed Sebastian reports, she has already talked to his [facility] doctors about his wishes and the hospital SW about SNF facilities in [place]

## 2022-04-27 NOTE — TELEPHONE ENCOUNTER
See care coordinator note, appears pt went to hand specialist follow up and subsequently hospitalized for hand surgery at [facility] currently

## 2022-04-27 NOTE — ED NOTES
Spoke with and wound culture report faxed to [facility] where the pt is admitted now. [address] [facility]  Rolly San RN  04/27/22 0271

## 2022-04-28 LAB
CULTURE: NORMAL
CULTURE: NORMAL
Lab: NORMAL
SPECIMEN DESCRIPTION: NORMAL
SPECIMEN DESCRIPTION: NORMAL

## 2022-05-09 NOTE — PROGRESS NOTES
Patient found in bathroom on toilet with full weight on left trans stump. Writer re informed on NWB status. General

## 2022-06-17 ENCOUNTER — TELEPHONE (OUTPATIENT)
Dept: FAMILY MEDICINE CLINIC | Age: 67
End: 2022-06-17

## 2022-06-17 DIAGNOSIS — Z91.81 AT HIGH RISK FOR FALLS: ICD-10-CM

## 2022-06-17 DIAGNOSIS — Z89.512 S/P BKA (BELOW KNEE AMPUTATION) BILATERAL (HCC): Primary | ICD-10-CM

## 2022-06-17 DIAGNOSIS — Z89.511 S/P BKA (BELOW KNEE AMPUTATION) BILATERAL (HCC): Primary | ICD-10-CM

## 2022-06-17 NOTE — TELEPHONE ENCOUNTER
Patient's daughter called stating that the patient just got home from [place] today and that they were supposed to call a referral for home care through [employer] home care for the patient however she doesn't know if that has been done by them. She would like to have this taken care of for her father and would like to know if Akil Ajith could place a referral for [facility] home health care so that she is sure this is being done for him. Please advise.

## 2022-06-20 NOTE — TELEPHONE ENCOUNTER
Seems unlikely unless his insurance changed given they previously provided home care within the past 6 months    Nevertheless, new referral placed, please fax

## 2022-06-20 NOTE — TELEPHONE ENCOUNTER
[facility] home care called stating that they are out of network with the patient's insurance.

## 2022-06-21 ENCOUNTER — OFFICE VISIT (OUTPATIENT)
Dept: FAMILY MEDICINE CLINIC | Age: 67
End: 2022-06-21
Payer: MEDICARE

## 2022-06-21 VITALS
SYSTOLIC BLOOD PRESSURE: 110 MMHG | OXYGEN SATURATION: 98 % | DIASTOLIC BLOOD PRESSURE: 62 MMHG | HEIGHT: 73 IN | TEMPERATURE: 96.8 F | HEART RATE: 93 BPM | BODY MASS INDEX: 21.11 KG/M2

## 2022-06-21 DIAGNOSIS — Z09 HOSPITAL DISCHARGE FOLLOW-UP: ICD-10-CM

## 2022-06-21 DIAGNOSIS — E11.42 DM TYPE 2 WITH DIABETIC PERIPHERAL NEUROPATHY (HCC): ICD-10-CM

## 2022-06-21 DIAGNOSIS — N20.0 KIDNEY STONES: ICD-10-CM

## 2022-06-21 DIAGNOSIS — Z89.512 S/P BKA (BELOW KNEE AMPUTATION) BILATERAL (HCC): Primary | ICD-10-CM

## 2022-06-21 DIAGNOSIS — G47.00 INSOMNIA, UNSPECIFIED TYPE: ICD-10-CM

## 2022-06-21 DIAGNOSIS — Z89.511 S/P BKA (BELOW KNEE AMPUTATION) BILATERAL (HCC): Primary | ICD-10-CM

## 2022-06-21 PROCEDURE — 99215 OFFICE O/P EST HI 40 MIN: CPT | Performed by: NURSE PRACTITIONER

## 2022-06-21 PROCEDURE — 1111F DSCHRG MED/CURRENT MED MERGE: CPT | Performed by: NURSE PRACTITIONER

## 2022-06-21 RX ORDER — INSULIN ASPART 100 [IU]/ML
INJECTION, SOLUTION INTRAVENOUS; SUBCUTANEOUS
Qty: 5 PEN | Refills: 3 | Status: SHIPPED | OUTPATIENT
Start: 2022-06-21

## 2022-06-21 RX ORDER — TAMSULOSIN HYDROCHLORIDE 0.4 MG/1
0.4 CAPSULE ORAL 2 TIMES DAILY
Qty: 60 CAPSULE | Refills: 2 | Status: SHIPPED | OUTPATIENT
Start: 2022-06-21

## 2022-06-21 RX ORDER — MIRTAZAPINE 7.5 MG/1
7.5 TABLET, FILM COATED ORAL NIGHTLY
Qty: 30 TABLET | Refills: 0 | Status: ON HOLD | OUTPATIENT
Start: 2022-06-21 | End: 2022-09-08

## 2022-06-21 RX ORDER — INSULIN DETEMIR 100 [IU]/ML
INJECTION, SOLUTION SUBCUTANEOUS
Qty: 10 ML | Refills: 3 | Status: SHIPPED | OUTPATIENT
Start: 2022-06-21

## 2022-06-21 SDOH — ECONOMIC STABILITY: FOOD INSECURITY: WITHIN THE PAST 12 MONTHS, THE FOOD YOU BOUGHT JUST DIDN'T LAST AND YOU DIDN'T HAVE MONEY TO GET MORE.: NEVER TRUE

## 2022-06-21 SDOH — ECONOMIC STABILITY: FOOD INSECURITY: WITHIN THE PAST 12 MONTHS, YOU WORRIED THAT YOUR FOOD WOULD RUN OUT BEFORE YOU GOT MONEY TO BUY MORE.: NEVER TRUE

## 2022-06-21 ASSESSMENT — ENCOUNTER SYMPTOMS
COUGH: 0
NAUSEA: 0
VOMITING: 0
BACK PAIN: 1

## 2022-06-21 ASSESSMENT — SOCIAL DETERMINANTS OF HEALTH (SDOH): HOW HARD IS IT FOR YOU TO PAY FOR THE VERY BASICS LIKE FOOD, HOUSING, MEDICAL CARE, AND HEATING?: NOT HARD AT ALL

## 2022-06-21 NOTE — PATIENT INSTRUCTIONS
Patient Education        Learning About Type 2 Diabetes  What is type 2 diabetes? Type 2 diabetes is a condition in which you have too much sugar (glucose) in your blood. Glucose is a type of sugar produced in your body when carbohydratesand other foods are digested. It provides energy to cells throughout the body. Normally, blood sugar levels increase after you eat a meal. When blood sugar rises, cells in the pancreas release insulin, which causes the body to absorbsugar from the blood and lowers the blood sugar level to normal.  When you have type 2 diabetes, sugar stays in the blood rather than entering the body's cells to be used for energy. This results in high blood sugar. Ithappens when your body can't use insulin the right way. Over time, high blood sugar can harm many parts of the body, such as your eyes, heart, blood vessels, nerves, and kidneys. It can also increase your risk forother health problems (complications). What can you expect with type 2 diabetes? Farhat Fine keep hearing about how important it is to keep your blood sugar within a target range. That's because over time, high blood sugar can lead to seriousproblems. It can:   Harm your eyes, nerves, and kidneys.  Damage your blood vessels, leading to heart disease and stroke.  Reduce blood flow and cause nerve damage to parts of your body, especially your feet. This can cause slow healing and pain when you walk.  Make your immune system weak and less able to fight infections. When people hear the word \"diabetes,\" they often think of problems like these. But daily care and treatment can help prevent or delay these problems. The goal is to keep your blood sugar in a target range. That's the best way to reduceyour chance of having more problems from diabetes. What are the symptoms? Some people who have type 2 diabetes may not have any symptoms early on.  Many people with the disease don't even know they have it at first. But with time, diabetes starts to cause symptoms. You have most symptoms of type 2 diabeteswhen your blood sugar is either too high or too low. The most common symptoms of high blood sugar include:   Thirst.   Needing to urinate often.  Weight loss.  Blurry vision. The symptoms of low blood sugar include:   Sweating.  Shakiness.  Weakness.  Hunger.  Confusion. You're not likely to get symptoms of low blood sugar unless you take insulin oruse certain diabetes medicines that lower blood sugar. How can you help prevent type 2 diabetes? There are things you can do to help prevent type 2 diabetes. Stay at a healthy weight. Exercise regularly, and eat healthy foods. Even small changes can make a difference. If you have prediabetes, the medicine metformin can help preventtype 2 diabetes. How is type 2 diabetes treated? Treatment for type 2 diabetes will change over time to meet your needs. But the focus of your treatment will usually be to keep your blood sugar levels in your target range. This will help prevent problems such as eye, kidney, heart, bloodvessel, and nerve disease. Some people may need medicines to help their bodies make insulin or decrease insulin resistance. Some medicines slow down how quickly the body absorbscarbohydrates. Treatment to manage type 2 diabetes includes:   Making healthy food choices and being active.  Losing weight, if you need to.  Seeing your doctor regularly.  Keeping your blood sugar in your target range.  Taking medicines, if you need them.  Quitting smoking, if you smoke.  Keeping your blood pressure and cholesterol under control. Follow-up care is a key part of your treatment and safety. Be sure to make and go to all appointments, and call your doctor if you are having problems. It's also a good idea to know your test results and keep alist of the medicines you take. Where can you learn more? Go to [email or web address]://[email or web address] org and sign in to your [employer] account. Enter C342 in the [facility] box to learn more about \"Learning About Type 2 Diabetes. \"     If you do not have an account, please click on the \"Sign Up Now\" link. Current as of: July 28, 2021               Content Version: 13.3  © 6294-0810 [employer]. Care instructions adapted under license by [facility] ([facility]). If you have questions about a medical condition or this instruction, always ask your healthcare professional. [address] any warranty or liability for your use of this information.
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
## 2022-07-20 ENCOUNTER — TELEPHONE (OUTPATIENT)
Dept: FAMILY MEDICINE CLINIC | Age: 67
End: 2022-07-20

## 2022-07-20 DIAGNOSIS — A04.72 C. DIFFICILE DIARRHEA: Primary | ICD-10-CM

## 2022-07-20 NOTE — TELEPHONE ENCOUNTER
Patient and his daughter called stating that the patient has been dealing with bowel issues for the last 6 months. He has been experiencing diarrhea and black stools. Patient would like a referral to [facility] gastro and once placed it needs faxed to [phone] . They would like this placed asap. Please advise.

## 2022-07-21 ENCOUNTER — TELEPHONE (OUTPATIENT)
Dept: FAMILY MEDICINE CLINIC | Age: 67
End: 2022-07-21

## 2022-07-22 ENCOUNTER — TELEPHONE (OUTPATIENT)
Dept: FAMILY MEDICINE CLINIC | Age: 67
End: 2022-07-22

## 2022-07-22 DIAGNOSIS — Z89.512 HISTORY OF BELOW KNEE AMPUTATION, LEFT (HCC): ICD-10-CM

## 2022-07-22 DIAGNOSIS — Z89.511 HISTORY OF BELOW KNEE AMPUTATION, RIGHT (HCC): Primary | ICD-10-CM

## 2022-07-22 NOTE — TELEPHONE ENCOUNTER
Seamus Missyjustin called and stated pt has a new wound. L- 2.9, W- 1.6, Depth 0.4. Trinine Missyjustin needs new orders to treat. Zohra Kathleen it is not infected.  Ap galeana fax number [phone]

## 2022-08-04 ENCOUNTER — TELEPHONE (OUTPATIENT)
Dept: FAMILY MEDICINE CLINIC | Age: 67
End: 2022-08-04

## 2022-08-04 NOTE — TELEPHONE ENCOUNTER
Luma from [address] [place] [address] contacted the office and states that she was just out to see patient and she wanted to notify the office that patient has an open wound on thigh. Jeanne Gentile states that wound is on Right Posterior Thigh and is down to the muscle. She would like to know what provider recommends. Please advise.

## 2022-08-05 NOTE — TELEPHONE ENCOUNTER
Spoke with [facility] and she stated that patient has not seen Visiting physicians. Writer consulted with  and directed patient to ER for wound to be evaluated. [facility] stated that she will notify patient of instructions. Home Care is requesting written orders for how to care for wound faxed over.

## 2022-08-29 ENCOUNTER — TELEPHONE (OUTPATIENT)
Dept: FAMILY MEDICINE CLINIC | Age: 67
End: 2022-08-29

## 2022-08-29 NOTE — TELEPHONE ENCOUNTER
Nicole from [facility] called in stating they needed new order for home health with recent OV notes within the past 90 days and diagnosis that matches choosing diagnosis for home health. She would like these faxed to [phone]. Referral for home health was already in patients chart, referral with last OV note were faxed to number given.

## 2022-09-06 ENCOUNTER — APPOINTMENT (OUTPATIENT)
Dept: CT IMAGING | Age: 67
DRG: 661 | End: 2022-09-06
Payer: MEDICARE

## 2022-09-06 ENCOUNTER — HOSPITAL ENCOUNTER (INPATIENT)
Age: 67
LOS: 3 days | Discharge: HOME HEALTH | DRG: 661 | End: 2022-09-10
Attending: EMERGENCY MEDICINE | Admitting: INTERNAL MEDICINE
Payer: MEDICARE

## 2022-09-06 ENCOUNTER — APPOINTMENT (OUTPATIENT)
Dept: GENERAL RADIOLOGY | Age: 67
DRG: 661 | End: 2022-09-06
Payer: MEDICARE

## 2022-09-06 DIAGNOSIS — N20.1 URETERIC STONE: Primary | ICD-10-CM

## 2022-09-06 DIAGNOSIS — N20.1 RIGHT URETERAL CALCULUS: ICD-10-CM

## 2022-09-06 LAB
ABSOLUTE EOS #: 0.1 K/UL (ref 0–0.44)
ABSOLUTE IMMATURE GRANULOCYTE: 0.07 K/UL (ref 0–0.3)
ABSOLUTE LYMPH #: 1.25 K/UL (ref 1.1–3.7)
ABSOLUTE MONO #: 0.95 K/UL (ref 0.1–1.2)
ALBUMIN SERPL-MCNC: 3.9 G/DL (ref 3.5–5.2)
ALP BLD-CCNC: 202 U/L (ref 40–129)
ALT SERPL-CCNC: 31 U/L (ref 5–41)
ANION GAP SERPL CALCULATED.3IONS-SCNC: 11 MMOL/L (ref 9–17)
AST SERPL-CCNC: 16 U/L
BASOPHILS # BLD: 1 % (ref 0–2)
BASOPHILS ABSOLUTE: 0.08 K/UL (ref 0–0.2)
BILIRUB SERPL-MCNC: 0.2 MG/DL (ref 0.3–1.2)
BILIRUBIN URINE: NEGATIVE
BUN BLDV-MCNC: 24 MG/DL (ref 8–23)
BUN/CREAT BLD: 13 (ref 9–20)
CALCIUM SERPL-MCNC: 9.6 MG/DL (ref 8.6–10.4)
CHLORIDE BLD-SCNC: 104 MMOL/L (ref 98–107)
CO2: 24 MMOL/L (ref 20–31)
COLOR: YELLOW
CREAT SERPL-MCNC: 1.79 MG/DL (ref 0.7–1.2)
EOSINOPHILS RELATIVE PERCENT: 1 % (ref 1–4)
EPITHELIAL CELLS UA: ABNORMAL /HPF (ref 0–5)
GFR AFRICAN AMERICAN: 47 ML/MIN
GFR NON-AFRICAN AMERICAN: 38 ML/MIN
GFR SERPL CREATININE-BSD FRML MDRD: ABNORMAL ML/MIN/{1.73_M2}
GLUCOSE BLD-MCNC: 339 MG/DL (ref 70–99)
GLUCOSE URINE: ABNORMAL
HCT VFR BLD CALC: 42.9 % (ref 40.7–50.3)
HEMOGLOBIN: 12.8 G/DL (ref 13–17)
IMMATURE GRANULOCYTES: 1 %
KETONES, URINE: NEGATIVE
LEUKOCYTE ESTERASE, URINE: NEGATIVE
LIPASE: 30 U/L (ref 13–60)
LYMPHOCYTES # BLD: 8 % (ref 24–43)
MAGNESIUM: 2.1 MG/DL (ref 1.6–2.6)
MCH RBC QN AUTO: 26.2 PG (ref 25.2–33.5)
MCHC RBC AUTO-ENTMCNC: 29.8 G/DL (ref 28.4–34.8)
MCV RBC AUTO: 87.9 FL (ref 82.6–102.9)
MONOCYTES # BLD: 6 % (ref 3–12)
NITRITE, URINE: NEGATIVE
NRBC AUTOMATED: 0 PER 100 WBC
PDW BLD-RTO: 15.3 % (ref 11.8–14.4)
PH UA: 6 (ref 5–8)
PLATELET # BLD: 416 K/UL (ref 138–453)
PMV BLD AUTO: 10.1 FL (ref 8.1–13.5)
POTASSIUM SERPL-SCNC: 5.1 MMOL/L (ref 3.7–5.3)
PROTEIN UA: ABNORMAL
RBC # BLD: 4.88 M/UL (ref 4.21–5.77)
RBC # BLD: ABNORMAL 10*6/UL
RBC UA: ABNORMAL /HPF (ref 0–2)
SEG NEUTROPHILS: 83 % (ref 36–65)
SEGMENTED NEUTROPHILS ABSOLUTE COUNT: 12.64 K/UL (ref 1.5–8.1)
SODIUM BLD-SCNC: 139 MMOL/L (ref 135–144)
SPECIFIC GRAVITY UA: 1.02 (ref 1–1.03)
TOTAL PROTEIN: 7.6 G/DL (ref 6.4–8.3)
TURBIDITY: CLEAR
URINE HGB: ABNORMAL
UROBILINOGEN, URINE: NORMAL
WBC # BLD: 15.1 K/UL (ref 3.5–11.3)
WBC UA: ABNORMAL /HPF (ref 0–5)

## 2022-09-06 PROCEDURE — 99285 EMERGENCY DEPT VISIT HI MDM: CPT

## 2022-09-06 PROCEDURE — 83735 ASSAY OF MAGNESIUM: CPT

## 2022-09-06 PROCEDURE — 81001 URINALYSIS AUTO W/SCOPE: CPT

## 2022-09-06 PROCEDURE — 96376 TX/PRO/DX INJ SAME DRUG ADON: CPT

## 2022-09-06 PROCEDURE — 74177 CT ABD & PELVIS W/CONTRAST: CPT

## 2022-09-06 PROCEDURE — 96375 TX/PRO/DX INJ NEW DRUG ADDON: CPT

## 2022-09-06 PROCEDURE — 2580000003 HC RX 258: Performed by: EMERGENCY MEDICINE

## 2022-09-06 PROCEDURE — 6360000004 HC RX CONTRAST MEDICATION: Performed by: EMERGENCY MEDICINE

## 2022-09-06 PROCEDURE — 71045 X-RAY EXAM CHEST 1 VIEW: CPT

## 2022-09-06 PROCEDURE — 6360000002 HC RX W HCPCS: Performed by: EMERGENCY MEDICINE

## 2022-09-06 PROCEDURE — 80053 COMPREHEN METABOLIC PANEL: CPT

## 2022-09-06 PROCEDURE — 93005 ELECTROCARDIOGRAM TRACING: CPT | Performed by: EMERGENCY MEDICINE

## 2022-09-06 PROCEDURE — 6370000000 HC RX 637 (ALT 250 FOR IP): Performed by: EMERGENCY MEDICINE

## 2022-09-06 PROCEDURE — 83690 ASSAY OF LIPASE: CPT

## 2022-09-06 PROCEDURE — 85025 COMPLETE CBC W/AUTO DIFF WBC: CPT

## 2022-09-06 PROCEDURE — 96365 THER/PROPH/DIAG IV INF INIT: CPT

## 2022-09-06 RX ORDER — ONDANSETRON 2 MG/ML
4 INJECTION INTRAMUSCULAR; INTRAVENOUS ONCE
Status: COMPLETED | OUTPATIENT
Start: 2022-09-06 | End: 2022-09-06

## 2022-09-06 RX ORDER — MORPHINE SULFATE 4 MG/ML
4 INJECTION, SOLUTION INTRAMUSCULAR; INTRAVENOUS
Status: COMPLETED | OUTPATIENT
Start: 2022-09-06 | End: 2022-09-06

## 2022-09-06 RX ORDER — 0.9 % SODIUM CHLORIDE 0.9 %
1000 INTRAVENOUS SOLUTION INTRAVENOUS ONCE
Status: COMPLETED | OUTPATIENT
Start: 2022-09-06 | End: 2022-09-06

## 2022-09-06 RX ORDER — 0.9 % SODIUM CHLORIDE 0.9 %
80 INTRAVENOUS SOLUTION INTRAVENOUS ONCE
Status: COMPLETED | OUTPATIENT
Start: 2022-09-06 | End: 2022-09-06

## 2022-09-06 RX ORDER — SODIUM CHLORIDE 0.9 % (FLUSH) 0.9 %
10 SYRINGE (ML) INJECTION ONCE
Status: COMPLETED | OUTPATIENT
Start: 2022-09-06 | End: 2022-09-06

## 2022-09-06 RX ORDER — FAMOTIDINE 20 MG/1
20 TABLET, FILM COATED ORAL ONCE
Status: COMPLETED | OUTPATIENT
Start: 2022-09-06 | End: 2022-09-06

## 2022-09-06 RX ORDER — KETOROLAC TROMETHAMINE 15 MG/ML
15 INJECTION, SOLUTION INTRAMUSCULAR; INTRAVENOUS ONCE
Status: COMPLETED | OUTPATIENT
Start: 2022-09-06 | End: 2022-09-06

## 2022-09-06 RX ADMIN — SODIUM CHLORIDE 80 ML: 9 INJECTION, SOLUTION INTRAVENOUS at 23:24

## 2022-09-06 RX ADMIN — IOPAMIDOL 75 ML: 755 INJECTION, SOLUTION INTRAVENOUS at 23:17

## 2022-09-06 RX ADMIN — ONDANSETRON 4 MG: 2 INJECTION INTRAMUSCULAR; INTRAVENOUS at 20:32

## 2022-09-06 RX ADMIN — SODIUM CHLORIDE, PRESERVATIVE FREE 10 ML: 5 INJECTION INTRAVENOUS at 23:23

## 2022-09-06 RX ADMIN — SODIUM CHLORIDE 1000 ML: 9 INJECTION, SOLUTION INTRAVENOUS at 20:31

## 2022-09-06 RX ADMIN — MORPHINE SULFATE 4 MG: 4 INJECTION, SOLUTION INTRAMUSCULAR; INTRAVENOUS at 20:32

## 2022-09-06 RX ADMIN — KETOROLAC TROMETHAMINE 15 MG: 15 INJECTION, SOLUTION INTRAMUSCULAR; INTRAVENOUS at 23:39

## 2022-09-06 RX ADMIN — FAMOTIDINE 20 MG: 20 TABLET, FILM COATED ORAL at 23:39

## 2022-09-06 ASSESSMENT — ENCOUNTER SYMPTOMS
ABDOMINAL PAIN: 0
VOMITING: 1
DIARRHEA: 1
NAUSEA: 1

## 2022-09-06 ASSESSMENT — PAIN SCALES - GENERAL
PAINLEVEL_OUTOF10: 9
PAINLEVEL_OUTOF10: 10
PAINLEVEL_OUTOF10: 10

## 2022-09-06 ASSESSMENT — PAIN - FUNCTIONAL ASSESSMENT: PAIN_FUNCTIONAL_ASSESSMENT: 0-10

## 2022-09-06 ASSESSMENT — PAIN DESCRIPTION - PAIN TYPE: TYPE: ACUTE PAIN

## 2022-09-06 ASSESSMENT — PAIN DESCRIPTION - FREQUENCY: FREQUENCY: CONTINUOUS

## 2022-09-06 ASSESSMENT — PAIN DESCRIPTION - LOCATION: LOCATION: FLANK

## 2022-09-06 ASSESSMENT — PAIN DESCRIPTION - ORIENTATION: ORIENTATION: LEFT
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
## 2022-09-07 ENCOUNTER — ANESTHESIA (OUTPATIENT)
Dept: OPERATING ROOM | Age: 67
DRG: 661 | End: 2022-09-07
Payer: MEDICARE

## 2022-09-07 ENCOUNTER — APPOINTMENT (OUTPATIENT)
Dept: GENERAL RADIOLOGY | Age: 67
DRG: 661 | End: 2022-09-07
Payer: MEDICARE

## 2022-09-07 ENCOUNTER — ANESTHESIA EVENT (OUTPATIENT)
Dept: OPERATING ROOM | Age: 67
DRG: 661 | End: 2022-09-07
Payer: MEDICARE

## 2022-09-07 PROBLEM — N20.0 BILATERAL KIDNEY STONES: Status: ACTIVE | Noted: 2022-09-07

## 2022-09-07 PROBLEM — N18.9 ACUTE KIDNEY INJURY SUPERIMPOSED ON CHRONIC KIDNEY DISEASE (HCC): Status: ACTIVE | Noted: 2018-07-10

## 2022-09-07 PROBLEM — N20.1 URETERIC STONE: Status: ACTIVE | Noted: 2022-09-07

## 2022-09-07 PROBLEM — Z86.19 HISTORY OF CLOSTRIDIOIDES DIFFICILE COLITIS: Chronic | Status: ACTIVE | Noted: 2022-09-07

## 2022-09-07 LAB
ABO/RH: NORMAL
ABSOLUTE EOS #: 0.09 K/UL (ref 0–0.44)
ABSOLUTE IMMATURE GRANULOCYTE: 0.04 K/UL (ref 0–0.3)
ABSOLUTE LYMPH #: 2.19 K/UL (ref 1.1–3.7)
ABSOLUTE MONO #: 0.78 K/UL (ref 0.1–1.2)
ALBUMIN SERPL-MCNC: 3.3 G/DL (ref 3.5–5.2)
ALP BLD-CCNC: 164 U/L (ref 40–129)
ALT SERPL-CCNC: 23 U/L (ref 5–41)
ANION GAP SERPL CALCULATED.3IONS-SCNC: 8 MMOL/L (ref 9–17)
ANION GAP SERPL CALCULATED.3IONS-SCNC: 9 MMOL/L (ref 9–17)
ANTIBODY SCREEN: NEGATIVE
ARM BAND NUMBER: NORMAL
AST SERPL-CCNC: 9 U/L
BACTERIA: ABNORMAL
BASOPHILS # BLD: 1 % (ref 0–2)
BASOPHILS ABSOLUTE: 0.06 K/UL (ref 0–0.2)
BILIRUB SERPL-MCNC: 0.1 MG/DL (ref 0.3–1.2)
BILIRUBIN URINE: NEGATIVE
BUN BLDV-MCNC: 25 MG/DL (ref 8–23)
BUN BLDV-MCNC: 26 MG/DL (ref 8–23)
BUN/CREAT BLD: 12 (ref 9–20)
BUN/CREAT BLD: 15 (ref 9–20)
CALCIUM SERPL-MCNC: 8.6 MG/DL (ref 8.6–10.4)
CALCIUM SERPL-MCNC: 8.6 MG/DL (ref 8.6–10.4)
CHLORIDE BLD-SCNC: 108 MMOL/L (ref 98–107)
CHLORIDE BLD-SCNC: 109 MMOL/L (ref 98–107)
CO2: 23 MMOL/L (ref 20–31)
CO2: 23 MMOL/L (ref 20–31)
COLOR: YELLOW
CREAT SERPL-MCNC: 1.77 MG/DL (ref 0.7–1.2)
CREAT SERPL-MCNC: 2.04 MG/DL (ref 0.7–1.2)
EKG ATRIAL RATE: 95 BPM
EKG P AXIS: 63 DEGREES
EKG P-R INTERVAL: 130 MS
EKG Q-T INTERVAL: 344 MS
EKG QRS DURATION: 80 MS
EKG QTC CALCULATION (BAZETT): 432 MS
EKG R AXIS: 43 DEGREES
EKG T AXIS: 52 DEGREES
EKG VENTRICULAR RATE: 95 BPM
EOSINOPHILS RELATIVE PERCENT: 1 % (ref 1–4)
EPITHELIAL CELLS UA: ABNORMAL /HPF (ref 0–5)
EXPIRATION DATE: NORMAL
GFR AFRICAN AMERICAN: 40 ML/MIN
GFR AFRICAN AMERICAN: 47 ML/MIN
GFR NON-AFRICAN AMERICAN: 33 ML/MIN
GFR NON-AFRICAN AMERICAN: 39 ML/MIN
GFR SERPL CREATININE-BSD FRML MDRD: ABNORMAL ML/MIN/{1.73_M2}
GFR SERPL CREATININE-BSD FRML MDRD: ABNORMAL ML/MIN/{1.73_M2}
GLUCOSE BLD-MCNC: 220 MG/DL (ref 70–99)
GLUCOSE BLD-MCNC: 232 MG/DL (ref 75–110)
GLUCOSE BLD-MCNC: 242 MG/DL (ref 70–99)
GLUCOSE BLD-MCNC: 243 MG/DL (ref 75–110)
GLUCOSE BLD-MCNC: 256 MG/DL (ref 75–110)
GLUCOSE BLD-MCNC: 305 MG/DL (ref 75–110)
GLUCOSE URINE: ABNORMAL
HCT VFR BLD CALC: 36.5 % (ref 40.7–50.3)
HEMOGLOBIN: 10.7 G/DL (ref 13–17)
IMMATURE GRANULOCYTES: 0 %
INR BLD: 1
KETONES, URINE: NEGATIVE
LEUKOCYTE ESTERASE, URINE: NEGATIVE
LYMPHOCYTES # BLD: 21 % (ref 24–43)
MCH RBC QN AUTO: 26.2 PG (ref 25.2–33.5)
MCHC RBC AUTO-ENTMCNC: 29.3 G/DL (ref 28.4–34.8)
MCV RBC AUTO: 89.5 FL (ref 82.6–102.9)
MONOCYTES # BLD: 7 % (ref 3–12)
NITRITE, URINE: NEGATIVE
NRBC AUTOMATED: 0 PER 100 WBC
PARTIAL THROMBOPLASTIN TIME: 26.6 SEC (ref 23.9–33.8)
PDW BLD-RTO: 15.6 % (ref 11.8–14.4)
PH UA: 5.5 (ref 5–8)
PLATELET # BLD: 367 K/UL (ref 138–453)
PMV BLD AUTO: 10.1 FL (ref 8.1–13.5)
POTASSIUM SERPL-SCNC: 4.3 MMOL/L (ref 3.7–5.3)
POTASSIUM SERPL-SCNC: 4.4 MMOL/L (ref 3.7–5.3)
PROSTATE SPECIFIC ANTIGEN: 2.59 NG/ML
PROTEIN UA: NEGATIVE
PROTHROMBIN TIME: 13.1 SEC (ref 11.5–14.2)
RBC # BLD: 4.08 M/UL (ref 4.21–5.77)
RBC # BLD: ABNORMAL 10*6/UL
RBC UA: ABNORMAL /HPF (ref 0–2)
SEG NEUTROPHILS: 70 % (ref 36–65)
SEGMENTED NEUTROPHILS ABSOLUTE COUNT: 7.52 K/UL (ref 1.5–8.1)
SODIUM BLD-SCNC: 139 MMOL/L (ref 135–144)
SODIUM BLD-SCNC: 141 MMOL/L (ref 135–144)
SPECIFIC GRAVITY UA: 1.01 (ref 1–1.03)
TOTAL PROTEIN: 6.4 G/DL (ref 6.4–8.3)
TURBIDITY: CLEAR
URINE HGB: ABNORMAL
UROBILINOGEN, URINE: NORMAL
WBC # BLD: 10.7 K/UL (ref 3.5–11.3)
WBC UA: ABNORMAL /HPF (ref 0–5)

## 2022-09-07 PROCEDURE — 82947 ASSAY GLUCOSE BLOOD QUANT: CPT

## 2022-09-07 PROCEDURE — 36415 COLL VENOUS BLD VENIPUNCTURE: CPT

## 2022-09-07 PROCEDURE — 97167 OT EVAL HIGH COMPLEX 60 MIN: CPT

## 2022-09-07 PROCEDURE — 6370000000 HC RX 637 (ALT 250 FOR IP): Performed by: NURSE PRACTITIONER

## 2022-09-07 PROCEDURE — 3700000000 HC ANESTHESIA ATTENDED CARE: Performed by: UROLOGY

## 2022-09-07 PROCEDURE — 1200000000 HC SEMI PRIVATE

## 2022-09-07 PROCEDURE — C2617 STENT, NON-COR, TEM W/O DEL: HCPCS | Performed by: UROLOGY

## 2022-09-07 PROCEDURE — 81001 URINALYSIS AUTO W/SCOPE: CPT

## 2022-09-07 PROCEDURE — 97163 PT EVAL HIGH COMPLEX 45 MIN: CPT

## 2022-09-07 PROCEDURE — 80048 BASIC METABOLIC PNL TOTAL CA: CPT

## 2022-09-07 PROCEDURE — 2709999900 HC NON-CHARGEABLE SUPPLY: Performed by: UROLOGY

## 2022-09-07 PROCEDURE — 6370000000 HC RX 637 (ALT 250 FOR IP): Performed by: UROLOGY

## 2022-09-07 PROCEDURE — 94761 N-INVAS EAR/PLS OXIMETRY MLT: CPT

## 2022-09-07 PROCEDURE — 85730 THROMBOPLASTIN TIME PARTIAL: CPT

## 2022-09-07 PROCEDURE — 3209999900 FLUORO FOR SURGICAL PROCEDURES

## 2022-09-07 PROCEDURE — 2580000003 HC RX 258: Performed by: EMERGENCY MEDICINE

## 2022-09-07 PROCEDURE — 2580000003 HC RX 258: Performed by: NURSE PRACTITIONER

## 2022-09-07 PROCEDURE — 6360000002 HC RX W HCPCS: Performed by: STUDENT IN AN ORGANIZED HEALTH CARE EDUCATION/TRAINING PROGRAM

## 2022-09-07 PROCEDURE — 84153 ASSAY OF PSA TOTAL: CPT

## 2022-09-07 PROCEDURE — 7100000000 HC PACU RECOVERY - FIRST 15 MIN: Performed by: UROLOGY

## 2022-09-07 PROCEDURE — 85025 COMPLETE CBC W/AUTO DIFF WBC: CPT

## 2022-09-07 PROCEDURE — 97530 THERAPEUTIC ACTIVITIES: CPT

## 2022-09-07 PROCEDURE — 6360000002 HC RX W HCPCS: Performed by: NURSE PRACTITIONER

## 2022-09-07 PROCEDURE — 86900 BLOOD TYPING SEROLOGIC ABO: CPT

## 2022-09-07 PROCEDURE — 6360000002 HC RX W HCPCS: Performed by: UROLOGY

## 2022-09-07 PROCEDURE — C1769 GUIDE WIRE: HCPCS | Performed by: UROLOGY

## 2022-09-07 PROCEDURE — 2500000003 HC RX 250 WO HCPCS: Performed by: ANESTHESIOLOGY

## 2022-09-07 PROCEDURE — 85610 PROTHROMBIN TIME: CPT

## 2022-09-07 PROCEDURE — 80053 COMPREHEN METABOLIC PANEL: CPT

## 2022-09-07 PROCEDURE — 87086 URINE CULTURE/COLONY COUNT: CPT

## 2022-09-07 PROCEDURE — 3600000012 HC SURGERY LEVEL 2 ADDTL 15MIN: Performed by: UROLOGY

## 2022-09-07 PROCEDURE — 6360000002 HC RX W HCPCS: Performed by: ANESTHESIOLOGY

## 2022-09-07 PROCEDURE — 2580000003 HC RX 258: Performed by: ANESTHESIOLOGY

## 2022-09-07 PROCEDURE — 3700000001 HC ADD 15 MINUTES (ANESTHESIA): Performed by: UROLOGY

## 2022-09-07 PROCEDURE — 97535 SELF CARE MNGMENT TRAINING: CPT

## 2022-09-07 PROCEDURE — 0T788DZ DILATION OF BILATERAL URETERS WITH INTRALUMINAL DEVICE, VIA NATURAL OR ARTIFICIAL OPENING ENDOSCOPIC: ICD-10-PCS | Performed by: UROLOGY

## 2022-09-07 PROCEDURE — 93010 ELECTROCARDIOGRAM REPORT: CPT | Performed by: INTERNAL MEDICINE

## 2022-09-07 PROCEDURE — 6360000004 HC RX CONTRAST MEDICATION: Performed by: UROLOGY

## 2022-09-07 PROCEDURE — APPSS45 APP SPLIT SHARED TIME 31-45 MINUTES: Performed by: NURSE PRACTITIONER

## 2022-09-07 PROCEDURE — 7100000001 HC PACU RECOVERY - ADDTL 15 MIN: Performed by: UROLOGY

## 2022-09-07 PROCEDURE — 0TJB8ZZ INSPECTION OF BLADDER, VIA NATURAL OR ARTIFICIAL OPENING ENDOSCOPIC: ICD-10-PCS | Performed by: UROLOGY

## 2022-09-07 PROCEDURE — 6360000002 HC RX W HCPCS: Performed by: EMERGENCY MEDICINE

## 2022-09-07 PROCEDURE — 2580000003 HC RX 258: Performed by: STUDENT IN AN ORGANIZED HEALTH CARE EDUCATION/TRAINING PROGRAM

## 2022-09-07 PROCEDURE — 86901 BLOOD TYPING SEROLOGIC RH(D): CPT

## 2022-09-07 PROCEDURE — 94640 AIRWAY INHALATION TREATMENT: CPT

## 2022-09-07 PROCEDURE — 86850 RBC ANTIBODY SCREEN: CPT

## 2022-09-07 PROCEDURE — 93005 ELECTROCARDIOGRAM TRACING: CPT | Performed by: UROLOGY

## 2022-09-07 PROCEDURE — 3600000002 HC SURGERY LEVEL 2 BASE: Performed by: UROLOGY

## 2022-09-07 DEVICE — URETERAL STENT
Type: IMPLANTABLE DEVICE | Site: URETER | Status: FUNCTIONAL
Brand: POLARIS™ ULTRA

## 2022-09-07 RX ORDER — PROPOFOL 10 MG/ML
INJECTION, EMULSION INTRAVENOUS CONTINUOUS PRN
Status: DISCONTINUED | OUTPATIENT
Start: 2022-09-07 | End: 2022-09-07 | Stop reason: SDUPTHER

## 2022-09-07 RX ORDER — FAMOTIDINE 20 MG/1
20 TABLET, FILM COATED ORAL 2 TIMES DAILY
Status: DISCONTINUED | OUTPATIENT
Start: 2022-09-07 | End: 2022-09-07 | Stop reason: DRUGHIGH

## 2022-09-07 RX ORDER — ONDANSETRON 2 MG/ML
4 INJECTION INTRAMUSCULAR; INTRAVENOUS EVERY 6 HOURS PRN
Status: DISCONTINUED | OUTPATIENT
Start: 2022-09-07 | End: 2022-09-10 | Stop reason: HOSPADM

## 2022-09-07 RX ORDER — ENOXAPARIN SODIUM 100 MG/ML
40 INJECTION SUBCUTANEOUS DAILY
Status: DISCONTINUED | OUTPATIENT
Start: 2022-09-07 | End: 2022-09-07

## 2022-09-07 RX ORDER — MORPHINE SULFATE 2 MG/ML
2 INJECTION, SOLUTION INTRAMUSCULAR; INTRAVENOUS
Status: DISCONTINUED | OUTPATIENT
Start: 2022-09-07 | End: 2022-09-10 | Stop reason: HOSPADM

## 2022-09-07 RX ORDER — SODIUM CHLORIDE 9 MG/ML
INJECTION, SOLUTION INTRAVENOUS CONTINUOUS
Status: DISCONTINUED | OUTPATIENT
Start: 2022-09-07 | End: 2022-09-09

## 2022-09-07 RX ORDER — AMOXICILLIN AND CLAVULANATE POTASSIUM 875; 125 MG/1; MG/1
1 TABLET, FILM COATED ORAL EVERY 12 HOURS SCHEDULED
Status: DISCONTINUED | OUTPATIENT
Start: 2022-09-07 | End: 2022-09-07

## 2022-09-07 RX ORDER — POLYETHYLENE GLYCOL 3350 17 G/17G
17 POWDER, FOR SOLUTION ORAL DAILY PRN
Status: DISCONTINUED | OUTPATIENT
Start: 2022-09-07 | End: 2022-09-10 | Stop reason: HOSPADM

## 2022-09-07 RX ORDER — SODIUM CHLORIDE 0.9 % (FLUSH) 0.9 %
5-40 SYRINGE (ML) INJECTION EVERY 12 HOURS SCHEDULED
Status: CANCELLED | OUTPATIENT
Start: 2022-09-07

## 2022-09-07 RX ORDER — INSULIN LISPRO 100 [IU]/ML
0-4 INJECTION, SOLUTION INTRAVENOUS; SUBCUTANEOUS EVERY 4 HOURS
Status: DISCONTINUED | OUTPATIENT
Start: 2022-09-07 | End: 2022-09-07

## 2022-09-07 RX ORDER — INSULIN LISPRO 100 [IU]/ML
0-4 INJECTION, SOLUTION INTRAVENOUS; SUBCUTANEOUS
Status: DISCONTINUED | OUTPATIENT
Start: 2022-09-07 | End: 2022-09-10 | Stop reason: HOSPADM

## 2022-09-07 RX ORDER — MORPHINE SULFATE 4 MG/ML
4 INJECTION, SOLUTION INTRAMUSCULAR; INTRAVENOUS
Status: DISCONTINUED | OUTPATIENT
Start: 2022-09-07 | End: 2022-09-10 | Stop reason: HOSPADM

## 2022-09-07 RX ORDER — SODIUM CHLORIDE 0.9 % (FLUSH) 0.9 %
5-40 SYRINGE (ML) INJECTION EVERY 12 HOURS SCHEDULED
Status: DISCONTINUED | OUTPATIENT
Start: 2022-09-07 | End: 2022-09-10 | Stop reason: HOSPADM

## 2022-09-07 RX ORDER — LANOLIN ALCOHOL/MO/W.PET/CERES
325 CREAM (GRAM) TOPICAL
Status: DISCONTINUED | OUTPATIENT
Start: 2022-09-07 | End: 2022-09-10 | Stop reason: HOSPADM

## 2022-09-07 RX ORDER — HYDROCODONE BITARTRATE AND ACETAMINOPHEN 5; 325 MG/1; MG/1
1 TABLET ORAL EVERY 6 HOURS PRN
Status: DISCONTINUED | OUTPATIENT
Start: 2022-09-07 | End: 2022-09-10 | Stop reason: HOSPADM

## 2022-09-07 RX ORDER — INSULIN LISPRO 100 [IU]/ML
0-4 INJECTION, SOLUTION INTRAVENOUS; SUBCUTANEOUS NIGHTLY
Status: DISCONTINUED | OUTPATIENT
Start: 2022-09-07 | End: 2022-09-10 | Stop reason: HOSPADM

## 2022-09-07 RX ORDER — FENTANYL CITRATE 50 UG/ML
25 INJECTION, SOLUTION INTRAMUSCULAR; INTRAVENOUS EVERY 5 MIN PRN
Status: CANCELLED | OUTPATIENT
Start: 2022-09-07

## 2022-09-07 RX ORDER — INSULIN GLARGINE 100 [IU]/ML
30 INJECTION, SOLUTION SUBCUTANEOUS EVERY EVENING
Refills: 3 | Status: DISCONTINUED | OUTPATIENT
Start: 2022-09-07 | End: 2022-09-10 | Stop reason: HOSPADM

## 2022-09-07 RX ORDER — SODIUM CHLORIDE 9 MG/ML
INJECTION, SOLUTION INTRAVENOUS PRN
Status: CANCELLED | OUTPATIENT
Start: 2022-09-07

## 2022-09-07 RX ORDER — ATORVASTATIN CALCIUM 10 MG/1
10 TABLET, FILM COATED ORAL NIGHTLY
Status: DISCONTINUED | OUTPATIENT
Start: 2022-09-07 | End: 2022-09-10 | Stop reason: HOSPADM

## 2022-09-07 RX ORDER — BUDESONIDE AND FORMOTEROL FUMARATE DIHYDRATE 160; 4.5 UG/1; UG/1
2 AEROSOL RESPIRATORY (INHALATION) 2 TIMES DAILY
Status: DISCONTINUED | OUTPATIENT
Start: 2022-09-07 | End: 2022-09-10 | Stop reason: HOSPADM

## 2022-09-07 RX ORDER — ONDANSETRON 2 MG/ML
4 INJECTION INTRAMUSCULAR; INTRAVENOUS
Status: CANCELLED | OUTPATIENT
Start: 2022-09-07 | End: 2022-09-07

## 2022-09-07 RX ORDER — AMOXICILLIN AND CLAVULANATE POTASSIUM 500; 125 MG/1; MG/1
1 TABLET, FILM COATED ORAL EVERY 12 HOURS SCHEDULED
Qty: 18 TABLET | Refills: 0 | Status: SHIPPED | OUTPATIENT
Start: 2022-09-07 | End: 2022-09-16

## 2022-09-07 RX ORDER — LIDOCAINE HYDROCHLORIDE 20 MG/ML
JELLY TOPICAL PRN
Status: DISCONTINUED | OUTPATIENT
Start: 2022-09-07 | End: 2022-09-07 | Stop reason: ALTCHOICE

## 2022-09-07 RX ORDER — DEXTROSE MONOHYDRATE 100 MG/ML
INJECTION, SOLUTION INTRAVENOUS CONTINUOUS PRN
Status: DISCONTINUED | OUTPATIENT
Start: 2022-09-07 | End: 2022-09-10 | Stop reason: HOSPADM

## 2022-09-07 RX ORDER — SODIUM CHLORIDE 9 MG/ML
INJECTION, SOLUTION INTRAVENOUS PRN
Status: DISCONTINUED | OUTPATIENT
Start: 2022-09-07 | End: 2022-09-10 | Stop reason: HOSPADM

## 2022-09-07 RX ORDER — LIDOCAINE HYDROCHLORIDE 20 MG/ML
INJECTION, SOLUTION EPIDURAL; INFILTRATION; INTRACAUDAL; PERINEURAL PRN
Status: DISCONTINUED | OUTPATIENT
Start: 2022-09-07 | End: 2022-09-07 | Stop reason: SDUPTHER

## 2022-09-07 RX ORDER — SODIUM CHLORIDE 0.9 % (FLUSH) 0.9 %
5-40 SYRINGE (ML) INJECTION PRN
Status: CANCELLED | OUTPATIENT
Start: 2022-09-07

## 2022-09-07 RX ORDER — ONDANSETRON 4 MG/1
4 TABLET, ORALLY DISINTEGRATING ORAL EVERY 8 HOURS PRN
Status: DISCONTINUED | OUTPATIENT
Start: 2022-09-07 | End: 2022-09-10 | Stop reason: HOSPADM

## 2022-09-07 RX ORDER — SODIUM CHLORIDE 0.9 % (FLUSH) 0.9 %
5-40 SYRINGE (ML) INJECTION PRN
Status: DISCONTINUED | OUTPATIENT
Start: 2022-09-07 | End: 2022-09-10 | Stop reason: HOSPADM

## 2022-09-07 RX ORDER — AMOXICILLIN AND CLAVULANATE POTASSIUM 875; 125 MG/1; MG/1
1 TABLET, FILM COATED ORAL EVERY 12 HOURS SCHEDULED
Qty: 9 TABLET | Refills: 0 | Status: SHIPPED | OUTPATIENT
Start: 2022-09-07 | End: 2022-09-07 | Stop reason: HOSPADM

## 2022-09-07 RX ORDER — AMOXICILLIN AND CLAVULANATE POTASSIUM 500; 125 MG/1; MG/1
1 TABLET, FILM COATED ORAL EVERY 12 HOURS SCHEDULED
Status: DISCONTINUED | OUTPATIENT
Start: 2022-09-07 | End: 2022-09-07

## 2022-09-07 RX ORDER — FAMOTIDINE 20 MG/1
20 TABLET, FILM COATED ORAL DAILY
Status: DISCONTINUED | OUTPATIENT
Start: 2022-09-07 | End: 2022-09-08

## 2022-09-07 RX ORDER — HYDROMORPHONE HYDROCHLORIDE 1 MG/ML
0.5 INJECTION, SOLUTION INTRAMUSCULAR; INTRAVENOUS; SUBCUTANEOUS EVERY 5 MIN PRN
Status: CANCELLED | OUTPATIENT
Start: 2022-09-07

## 2022-09-07 RX ORDER — PROPOFOL 10 MG/ML
INJECTION, EMULSION INTRAVENOUS PRN
Status: DISCONTINUED | OUTPATIENT
Start: 2022-09-07 | End: 2022-09-07 | Stop reason: SDUPTHER

## 2022-09-07 RX ORDER — SODIUM CHLORIDE 9 MG/ML
INJECTION, SOLUTION INTRAVENOUS CONTINUOUS PRN
Status: DISCONTINUED | OUTPATIENT
Start: 2022-09-07 | End: 2022-09-07 | Stop reason: SDUPTHER

## 2022-09-07 RX ORDER — MORPHINE SULFATE 4 MG/ML
4 INJECTION, SOLUTION INTRAMUSCULAR; INTRAVENOUS ONCE
Status: COMPLETED | OUTPATIENT
Start: 2022-09-07 | End: 2022-09-07

## 2022-09-07 RX ORDER — ASPIRIN 81 MG/1
81 TABLET ORAL DAILY
Status: DISCONTINUED | OUTPATIENT
Start: 2022-09-07 | End: 2022-09-10 | Stop reason: HOSPADM

## 2022-09-07 RX ORDER — TAMSULOSIN HYDROCHLORIDE 0.4 MG/1
0.4 CAPSULE ORAL DAILY
Status: DISCONTINUED | OUTPATIENT
Start: 2022-09-07 | End: 2022-09-10 | Stop reason: HOSPADM

## 2022-09-07 RX ADMIN — APIXABAN 5 MG: 5 TABLET, FILM COATED ORAL at 21:31

## 2022-09-07 RX ADMIN — INSULIN LISPRO 1 UNITS: 100 INJECTION, SOLUTION INTRAVENOUS; SUBCUTANEOUS at 12:33

## 2022-09-07 RX ADMIN — SODIUM CHLORIDE: 9 INJECTION, SOLUTION INTRAVENOUS at 03:36

## 2022-09-07 RX ADMIN — ASPIRIN 81 MG: 81 TABLET, COATED ORAL at 09:05

## 2022-09-07 RX ADMIN — INSULIN LISPRO 1 UNITS: 100 INJECTION, SOLUTION INTRAVENOUS; SUBCUTANEOUS at 09:07

## 2022-09-07 RX ADMIN — SODIUM CHLORIDE: 9 INJECTION, SOLUTION INTRAVENOUS at 06:18

## 2022-09-07 RX ADMIN — SODIUM CHLORIDE, PRESERVATIVE FREE 10 ML: 5 INJECTION INTRAVENOUS at 09:04

## 2022-09-07 RX ADMIN — MORPHINE SULFATE 4 MG: 4 INJECTION, SOLUTION INTRAMUSCULAR; INTRAVENOUS at 06:14

## 2022-09-07 RX ADMIN — BUDESONIDE AND FORMOTEROL FUMARATE DIHYDRATE 2 PUFF: 160; 4.5 AEROSOL RESPIRATORY (INHALATION) at 21:38

## 2022-09-07 RX ADMIN — MORPHINE SULFATE 4 MG: 4 INJECTION, SOLUTION INTRAMUSCULAR; INTRAVENOUS at 13:10

## 2022-09-07 RX ADMIN — INSULIN GLARGINE 30 UNITS: 100 INJECTION, SOLUTION SUBCUTANEOUS at 17:44

## 2022-09-07 RX ADMIN — CEFTRIAXONE SODIUM 1000 MG: 1 INJECTION, POWDER, FOR SOLUTION INTRAMUSCULAR; INTRAVENOUS at 12:32

## 2022-09-07 RX ADMIN — INSULIN LISPRO 2 UNITS: 100 INJECTION, SOLUTION INTRAVENOUS; SUBCUTANEOUS at 17:44

## 2022-09-07 RX ADMIN — TAMSULOSIN HYDROCHLORIDE 0.4 MG: 0.4 CAPSULE ORAL at 09:05

## 2022-09-07 RX ADMIN — MORPHINE SULFATE 4 MG: 4 INJECTION, SOLUTION INTRAMUSCULAR; INTRAVENOUS at 09:13

## 2022-09-07 RX ADMIN — SODIUM CHLORIDE: 9 INJECTION, SOLUTION INTRAVENOUS at 23:47

## 2022-09-07 RX ADMIN — MORPHINE SULFATE 4 MG: 4 INJECTION, SOLUTION INTRAMUSCULAR; INTRAVENOUS at 02:35

## 2022-09-07 RX ADMIN — BUDESONIDE AND FORMOTEROL FUMARATE DIHYDRATE 2 PUFF: 160; 4.5 AEROSOL RESPIRATORY (INHALATION) at 10:16

## 2022-09-07 RX ADMIN — FAMOTIDINE 20 MG: 20 TABLET, FILM COATED ORAL at 09:05

## 2022-09-07 RX ADMIN — CEFTRIAXONE SODIUM 1000 MG: 1 INJECTION, POWDER, FOR SOLUTION INTRAMUSCULAR; INTRAVENOUS at 02:37

## 2022-09-07 RX ADMIN — PROPOFOL 50 MG: 10 INJECTION, EMULSION INTRAVENOUS at 03:43

## 2022-09-07 RX ADMIN — SODIUM CHLORIDE: 9 INJECTION, SOLUTION INTRAVENOUS at 09:12

## 2022-09-07 RX ADMIN — FERROUS SULFATE TAB EC 325 MG (65 MG FE EQUIVALENT) 325 MG: 325 (65 FE) TABLET DELAYED RESPONSE at 09:05

## 2022-09-07 RX ADMIN — MORPHINE SULFATE 4 MG: 4 INJECTION, SOLUTION INTRAMUSCULAR; INTRAVENOUS at 23:40

## 2022-09-07 RX ADMIN — INSULIN LISPRO 4 UNITS: 100 INJECTION, SOLUTION INTRAVENOUS; SUBCUTANEOUS at 21:32

## 2022-09-07 RX ADMIN — HYDROCODONE BITARTRATE AND ACETAMINOPHEN 1 TABLET: 5; 325 TABLET ORAL at 05:13

## 2022-09-07 RX ADMIN — MORPHINE SULFATE 4 MG: 4 INJECTION, SOLUTION INTRAMUSCULAR; INTRAVENOUS at 17:48

## 2022-09-07 RX ADMIN — METOPROLOL TARTRATE 25 MG: 25 TABLET, FILM COATED ORAL at 21:31

## 2022-09-07 RX ADMIN — PROPOFOL 50 MCG/KG/MIN: 10 INJECTION, EMULSION INTRAVENOUS at 03:43

## 2022-09-07 RX ADMIN — APIXABAN 5 MG: 5 TABLET, FILM COATED ORAL at 09:05

## 2022-09-07 RX ADMIN — ATORVASTATIN CALCIUM 10 MG: 10 TABLET, FILM COATED ORAL at 21:31

## 2022-09-07 RX ADMIN — HYDROCODONE BITARTRATE AND ACETAMINOPHEN 1 TABLET: 5; 325 TABLET ORAL at 21:31

## 2022-09-07 RX ADMIN — LIDOCAINE HYDROCHLORIDE 50 MG: 20 INJECTION, SOLUTION EPIDURAL; INFILTRATION; INTRACAUDAL; PERINEURAL at 03:43

## 2022-09-07 ASSESSMENT — PAIN - FUNCTIONAL ASSESSMENT
PAIN_FUNCTIONAL_ASSESSMENT: PREVENTS OR INTERFERES SOME ACTIVE ACTIVITIES AND ADLS
PAIN_FUNCTIONAL_ASSESSMENT: PREVENTS OR INTERFERES SOME ACTIVE ACTIVITIES AND ADLS
PAIN_FUNCTIONAL_ASSESSMENT: ACTIVITIES ARE NOT PREVENTED
PAIN_FUNCTIONAL_ASSESSMENT: PREVENTS OR INTERFERES SOME ACTIVE ACTIVITIES AND ADLS
PAIN_FUNCTIONAL_ASSESSMENT: ACTIVITIES ARE NOT PREVENTED

## 2022-09-07 ASSESSMENT — PAIN SCALES - GENERAL
PAINLEVEL_OUTOF10: 8
PAINLEVEL_OUTOF10: 9
PAINLEVEL_OUTOF10: 7
PAINLEVEL_OUTOF10: 8
PAINLEVEL_OUTOF10: 8
PAINLEVEL_OUTOF10: 7
PAINLEVEL_OUTOF10: 9
PAINLEVEL_OUTOF10: 8
PAINLEVEL_OUTOF10: 6

## 2022-09-07 ASSESSMENT — ENCOUNTER SYMPTOMS
DIARRHEA: 1
VOMITING: 1
NAUSEA: 1

## 2022-09-07 ASSESSMENT — PAIN DESCRIPTION - LOCATION
LOCATION: FLANK

## 2022-09-07 ASSESSMENT — PAIN DESCRIPTION - ORIENTATION
ORIENTATION: LEFT
ORIENTATION: RIGHT;LEFT
ORIENTATION: LEFT

## 2022-09-07 ASSESSMENT — PAIN DESCRIPTION - ONSET
ONSET: ON-GOING

## 2022-09-07 ASSESSMENT — PAIN DESCRIPTION - DESCRIPTORS
DESCRIPTORS: ACHING
DESCRIPTORS: SHARP

## 2022-09-07 ASSESSMENT — PAIN DESCRIPTION - FREQUENCY
FREQUENCY: CONTINUOUS

## 2022-09-07 ASSESSMENT — PAIN DESCRIPTION - PAIN TYPE
TYPE: ACUTE PAIN;SURGICAL PAIN
TYPE: SURGICAL PAIN
TYPE: ACUTE PAIN;SURGICAL PAIN
TYPE: SURGICAL PAIN;ACUTE PAIN
TYPE: SURGICAL PAIN

## 2022-09-07 NOTE — PROGRESS NOTES
Patient admitted to room 2016 from PACU per cart. Vitals and general assessment completed as charted. Patient oriented to room, call light, and bed controls. Call light placed within reach, side rails up x 2, and bed in lowest position. Bed alarm activated for patient's safety.
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
## 2022-09-07 NOTE — ED NOTES
Patient requesting Pepcid. Writer notified Dr. Tatianna Cobb.      Khurram Echevarria, RN  66/26/92 [id number]

## 2022-09-07 NOTE — PROGRESS NOTES
Occupational Therapy  Facility/Department: [facility] MED SURG  Occupational Therapy Initial Assessment    Name: Wanda August  : 1955  MRN: [id number]  Date of Service: 2022    RN Garret Leon reports patient is medically stable for therapy treatment this date. Chart reviewed prior to treatment and patient is agreeable for therapy. All lines intact and patient positioned comfortably at end of treatment. All patient needs addressed prior to ending therapy session. Discharge Recommendations:  Patient would benefit from continued therapy after discharge  OT Equipment Recommendations  Equipment Needed:  (CTA)  Due to recent hospitalization and medical condition, pt would benefit from additional intermittent skilled therapy at time of discharge. Please refer to the AM-PAC score for current functional status. PER ED NOTE: Wanda August is a 61 y.o. male with an extensive past medical history who presents to the emergency department complaining of left-sided flank pain as well as vomiting that started last night. He states it feels like he has kidney stones. He reports a history of kidney stones and was hospitalized 2 to 3 months ago at [facility]. He reports decreased urination since last night. No pain with urination. He reports chills but no measured fevers. He has also had 6 months of diarrhea which his doctors are aware of. He also complains of a \"stomachache\". Denies chest pain. He does report mild shortness of breath. Patient Diagnosis(es): The primary encounter diagnosis was Ureteric stone. A diagnosis of Right ureteral calculus was also pertinent to this visit.   Past Medical History:  has a past medical history of Abdominal pain, Allergic rhinitis, Cellulitis of left lower extremity at BKA stump, Cellulitis of right heel, Chronic kidney disease, Chronic refractory osteomyelitis of left foot ([address] COPD (chronic obstructive pulmonary disease) ([facility] Depression, Diabetic neuropathy ([facility] IADLs; Decreased fine motor control;Decreased coordination;Decreased posture  Assessment: Pt has Bilateral BKAs and digits on R hand exhibit flexion contracture. Pt is limited in his participation in session d/t agitation and tolerated sitting EOB approx 3-4 min. Pt has deficits in functional mobilty, transfers, and LB and UB ADLs. Pt would benefit from skilled OT to increase IND/safety with ADL tasks to return to PLOF. Prognosis: Fair  Decision Making: High Complexity  REQUIRES OT FOLLOW-UP: Yes  Activity Tolerance  Activity Tolerance: Treatment limited secondary to agitation  Activity Tolerance Comments: Pt tolerated sitting EOB approx 3-4 min and then became agitated and returned to supine. Plan   Plan  Times per Week: 3-4x/week; 1x/day as ace  Current Treatment Recommendations: ROM, Balance training, Functional mobility training, Endurance training, Pain management, Safety education & training, Patient/Caregiver education & training, Equipment evaluation, education, & procurement, Positioning, Self-Care / ADL, Home management training, Cognitive/Perceptual training, Coordination training, Sensory integraion     Restrictions  Restrictions/Precautions  Restrictions/Precautions: Contact Precautions  Position Activity Restriction  Other position/activity restrictions: Up with assist; LUE IV    Subjective   General  Chart Reviewed: Yes  Patient assessed for rehabilitation services?: Yes  Family / Caregiver Present: No  Subjective  Subjective: Pt states, \"I can't put my prosthetic leg on because it hurts and I have a scab falling off of my left leg. I am not going to pivot for you guys and I don't use the slideboard. \" Pt exhibits tangential speech and requires cues to redirect.      Social/Functional History  Social/Functional History  Lives With: Alone  Type of Home: House  Home Layout: One level  Home Access: Level entry  Bathroom Shower/Tub: Tub/Shower unit  Bathroom Toilet: Handicap height  Bathroom Equipment: Shower chair  Bathroom Accessibility: Accessible  Home Equipment: James Hahn, [address] Help From: Home health (HHA?)  ADL Assistance: Independent  Homemaking Assistance: Needs assistance (MOW)  Ambulation Assistance: Independent (uses w/c for all mobility)  Transfer Assistance: Independent (always donns prosthesis prior to transfer. Does not like sliding board.)  Active : No  Patient's  Info: friend drives  Additional Comments: has prosthetic RLE       Objective   Heart Rate: 89  Heart Rate Source: Monitor  BP: (!) 103/50  BP Location: Right Arm  MAP (Calculated): 67.67  Resp: 16  SpO2: 98 %  O2 Device: None (Room air)          Observation/Palpation  Posture: Good  Observation: Bilateral BKA; LUE IV; Pt has gouge behind R knee from prosthetic; Scabbing on stump of L LE. Contracture of digits on L hand (pt reports it has been that way for 2 months)  Safety Devices  Type of Devices: Bed alarm in place;Call light within reach; Left in bed  Balance  Sitting: High guard (SBA)  Gait  Overall Level of Assistance:  (Unable to attempt STS transfers or functional mob as pt has B BKA's and was not willing to don his RLE prosthetic to participate in transfer.)     AROM: Within functional limits (Digits on RUE are in flexion contracture)  Strength: Within functional limits (BUE: 4+/5)  Coordination: Generally decreased, functional (Digits on R hand in flexion contracture; LUE: WFL)  Tone: Normal  Sensation: Impaired  ADL  Feeding: Setup  Grooming: Setup;Minimal assistance  UE Bathing: Minimal assistance  LE Bathing: Moderate assistance;Minimal assistance  UE Dressing: Minimal assistance  UE Dressing Skilled Clinical Factors: Donned pt's gown with Min A. LE Dressing: Moderate assistance;Minimal assistance  Toileting: Moderate assistance;Minimal assistance  Toileting Skilled Clinical Factors: Pt utilizing urinal while supine in bed.   Additional Comments: ADLs limited because pt stated he is unwilling to put prosthetic on his RLE d/t pain and he requires his prosthetic to transfer. Pt became agitated and did not want to engage in further therapy. Bed mobility  Supine to Sit: Supervision  Sit to Supine: Supervision  Scooting: Supervision        Cognition  Overall Cognitive Status: Exceptions  Arousal/Alertness: Appropriate responses to stimuli  Following Commands: Follows multistep commands with repitition; Follows multistep commands with increased time  Attention Span: Attends with cues to redirect  Memory: Appears intact  Safety Judgement: Decreased awareness of need for safety  Problem Solving: Assistance required to generate solutions;Assistance required to implement solutions  Insights: Decreased awareness of deficits  Cognition Comment: Pt demo's decreased safety awareness and insight. Becomes easily agitated. Orientation  Overall Orientation Status: Within Functional Limits  Perception  Overall Perceptual Status: WFL               Education Given To: Patient  Education Provided: Role of Therapy;Plan of Care;ADL Adaptive Strategies; Fall Prevention Strategies; Energy Conservation  Education Method: Verbal  Barriers to Learning: Cognition  Education Outcome: Continued education needed           AM-PAC Score        AM-[facility] Inpatient Daily Activity Raw Score: 16 (09/07/22 1614)  AM-PAC Inpatient ADL T-Scale Score : 35.96 (09/07/22 1614)  ADL Inpatient CMS 0-100% Score: 53.32 (09/07/22 1614)  ADL Inpatient CMS G-Code Modifier : CK (09/07/22 1614)         Goals  Short Term Goals  Time Frame for Short term goals: By discharge, pt will  Short Term Goal 1: Demo ADL transfers to supervision with approp AD/DME to facilitate safe return home. Short Term Goal 2: Demo UB ADLs to MI and LB ADLs to Min A with use of AE and good safety. Short Term Goal 3: Tolerate sitting EOB x20 min for ADL completion with MI.   Short Term Goal 4: Demo and verb good understanding of fall prevention techs, EC/WS techs, safe ADL strategies, positioning techs, PROM of R hand, skin integrity, pressure relief techs, and possible equip needs. Patient Goals   Patient goals :  To go home       Therapy Time   Individual Concurrent Group Co-treatment   Time In 1408(includes 10 min for chart review)         Time Out 1447         Minutes 39          Total treatment minutes: 2500 Tisha Youngblood, S/OT

## 2022-09-07 NOTE — ED NOTES
ED to inpatient nurses report     Chief Complaint   Patient presents with    Flank Pain     Left, no dysuria    Nausea     With emesis over last 6 months      Present to ED from home. Pt stated he has been having left sided flank pain. Hx of kidney stones. No issues with urination. Pt has had chronic n/v/d for the last 6 month. Pt sister believes he has IBS. LOC: alert and orientated to name, place, date  Vital signs   Vitals:    09/06/22 2100 09/06/22 2343 09/07/22 0126 09/07/22 0127   BP: 134/71 130/76 (!) 107/58    Pulse: 90 98  72   Resp: 26      Temp:       SpO2:  97%  97%   Weight:       Height:          Oxygen Baseline: none    Current needs required: none  LDAs:   Peripheral IV 09/06/22 Left Antecubital (Active)   Site Assessment Clean, dry & intact 09/06/22 2030   Line Status Blood return noted; Flushed; Infusing;Specimen collected 09/06/22 2030   Dressing Status New dressing applied;Clean, dry & intact 09/06/22 2030     Mobility: Pt is a bilateral BKA. Pt has prostethic on left leg. Fall Risk: High fall risk  Pending ED orders: Need stool sample  Present condition:   Code Status: Full  Consults: IP CONSULT TO UROLOGY  IP CONSULT TO INTERNAL MEDICINE  [x]  Hospitalist  Completed  [x] yes [] no Who: Orlop  []  Medicine  Completed  [] yes [] No Who:   []  Cardiology  Completed  [] yes [] No Who:   []  GI   Completed  [] yes [] No Who:   []  Neurology  Completed  [] yes [] No Who:   []  Nephrology Completed  [] yes [] No Who:    []  Vascular  Completed  [] yes [] No Who:   []  Ortho  Completed  [] yes [] No Who:     []  Surgery  Completed  [] yes [] No Who:    [x]  Urology  Completed  [x] yes [] No Who: Jeanette Beckhma   []  CT Surgery Completed  [] yes [] No Who:   []  Podiatry  Completed  [] yes [] No Who:    []  Other    Completed  [] yes [] No Who:  Interventions: labs, xray, ct abdomen pelvis. IV antibiotics. Pain medication. Pt going to OR with Dr. Fuentes at [address] [place] [address].     Important Events:  CT scan showed multiple small calculi remain in right and left kidney. Mild to moderate hydronephrosis at right and left kidney d/t calculi in proximal right and left ureter. 15 mm nodule in right adrenal gland.      Electronically signed by Liliana Poe RN on 9/7/2022 at 2:52 AM                   Liliana Poe, [address] [place] [address]  09/07/22 4595
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
## 2022-09-07 NOTE — PLAN OF CARE
Cr rising - will keep pt on rocephin, and if Cr improves tomorrow will dc with reduced dose augmentin    Electronically signed by Jace Mohr MD on 9/7/2022 at 10:40 AM

## 2022-09-07 NOTE — PLAN OF CARE

## 2022-09-07 NOTE — ANESTHESIA POSTPROCEDURE EVALUATION
Department of Anesthesiology  Postprocedure Note    Patient: Fitz Mcintosh  MRN: [id number]  YOB: 1955  Date of evaluation: 9/7/2022      Procedure Summary     Date: 09/07/22 Room / Location: [facility] - INPATIENT    Anesthesia Start: 4077 Anesthesia Stop: 0413    Procedure: CYSTOSCOPY BILATERALRETROGRADE PYELOGRAM  BILATERAL STENT INSERTION (Bilateral: Ureter) Diagnosis:       Right ureteral calculus      (BILATERAL URETERAL STONES)    Surgeons: Alexandr Snider MD Responsible Provider: Homero Monte DO    Anesthesia Type: MAC ASA Status: 3 - Emergent          Anesthesia Type: No value filed.     Lynne Phase I:      Lynne Phase II:        Anesthesia Post Evaluation    Patient location during evaluation: PACU  Patient participation: complete - patient participated  Level of consciousness: awake and alert  Airway patency: patent  Nausea & Vomiting: no nausea and no vomiting  Complications: no  Cardiovascular status: hemodynamically stable  Respiratory status: acceptable  Hydration status: stable

## 2022-09-07 NOTE — PROGRESS NOTES
Patient's daughter, Esperanza was updated regarding patient's status and was asked to give an update to Roland Ozuna too since the writer couldn't get hold of her.

## 2022-09-07 NOTE — DISCHARGE INSTR - COC
Continuity of Care Form    Patient Name: Lucille Marie   :  1955  MRN:  [id number]    Admit date:  2022  Discharge date:  9-10-22    Code Status Order: Full Code   Advance Directives:     Admitting Physician:  No admitting provider for patient encounter. PCP: MARCELO Segura CNP    Discharging Nurse:   [address] Unit/Room#:   Discharging Unit Phone Number: [phone]    Emergency Contact:   Extended Emergency Contact Information  Primary Emergency Contact: Shaila Paulino 81, Kris Reid Daysi [address] Phone: [phone]  Relation: Child   needed?  No  Secondary Emergency Contact: Fartun Baeza  Address: [address]  Home Phone: [phone]  Relation: Parent    Past Surgical History:  Past Surgical History:   Procedure Laterality Date    BLADDER SURGERY Bilateral 2022    CYSTOSCOPY BILATERALRETROGRADE PYELOGRAM  BILATERAL STENT INSERTION performed by Frederico Boxer, MD at [place] [address]  2015    hyperplastic polyp    COLONOSCOPY  2017    ESOPHAGECTOMY      cancer    FOOT DEBRIDEMENT Left 2021    I&D LEFT FOOT WITH REMOVAL OF NONVIABLE BONE AND SOFT TISSUE performed by Mackenzie Walters DPM at [address] [place] [address] Left 2021    LEFT FOOT DEBRIDEMENT WITH REMOVAL ALL NON VIABLE SOFT TISSUE AND BONE performed by Mackenzie Walters DPM at [address] [place] [address] Right 2016    I & D heel    FOOT SURGERY Right 2016    I & D    FRACTURE SURGERY Left 2015    humerus left, left leg    HC CATH POWER PICC SINGLE  2021         IR INS PICC VAD W SQ PORT GREATER THAN 5  2020    IR INS PICC VAD W SQ PORT GREATER THAN 5 2020 MD [facility]    IR INS PICC VAD W SQ PORT GREATER THAN 5  2021    IR INS PICC VAD W SQ PORT GREATER THAN 5 2021 MD [facility]    IR INS PICC VAD W SQ PORT GREATER THAN 5  2021    IR INS PICC VAD W SQ PORT GREATER THAN 5 Chronic pain syndrome G89.4    Marijuana use F12.90    History of colon polyps Z86.010    Functional diarrhea K59.1    Sepsis due to methicillin resistant Staphylococcus aureus (MRSA) ([facility]) A41.02    History of esophageal cancer Z85.01    Osteomyelitis, chronic, ankle or foot ([facility]) M86.679    PAD (peripheral artery disease) ([facility]) I73.9    Other pulmonary embolism without acute cor pulmonale ([facility]) I26.99    Carotid stenosis, asymptomatic, bilateral I65.23    Lower limb amputation status Z89.619    Fx humeral neck, right, closed, initial encounter S42.211A    Transient hypotension I95.9    Constipation due to opioid therapy K59.03, T40.2X5A    Acute kidney injury superimposed on chronic kidney disease ([facility]) D45.4, G24.0    Complication of below knee amputation stump ([facility]) T87.9    COPD exacerbation ([facility]) J44.1    Hyponatremia E87.1    Pain, phantom limb ([facility]) G54.6    S/P BKA (below knee amputation) bilateral ([facility]) Z89.512, Z89.511    Hyperglycemia R73.9    Moderate protein malnutrition ([facility]) E44.0    Essential hypertension I10    Uncontrolled type 2 diabetes mellitus with hyperglycemia ([facility]) E11.65    History of pulmonary embolism - 2017 Z86.711    Atelectasis J98.11    Uncontrolled type 2 diabetes mellitus with foot ulcer ([facility]) E11.621, L97.509, E11.65    Azotemia R79.89    Anemia, normocytic normochromic D64.9    Drug-induced constipation K59.03    Osteomyelitis of metatarsals of left foot ([facility]) M86.9    Diabetic foot infection ([facility]) E11.628, L08.9    Noncompliance Z91.19    Type 1 diabetes mellitus with diabetic foot infection ([facility]) E10.628, L08.9    Acute osteomyelitis of left foot ([facility]) M86.172    Cellulitis of left foot L03. 116    Mild malnutrition ([facility]) E44.1    Hypocalcemia E83.51    Hypokalemia E87.6    Moderate malnutrition ([facility]) E44.0    History of below knee amputation, right ([address] Z89.511    Foot ulcer with fat layer exposed, left ([address] L97.522    Chronic refractory osteomyelitis of left foot ([address] A85.125    Sepsis ([facility] A41.9    Pyogenic inflammation of bone ([facility]) M86.9    Cellulitis of left lower extremity L03.116    History of below knee amputation, left ([facility]) Z89.512    Leg ulcer, left, with fat layer exposed ([facility] L97.922    S/P amputation Z89.9    Tobacco abuse Z72.0    Pneumonia of right lower lobe due to infectious organism J18.9    Abdominal pain R10.9    Cannabis abuse F12.10    Parapneumonic effusion J18.9, J91.8    Hiatus hernia -large Y79.3    Metabolic encephalopathy O73.58    Altered mental status R41.82    Hyperkalemia C43.2    Alcoholic intoxication without complication ([facility]) Y77.144    Acute encephalopathy G93.40    Depression F32. A    History of Clostridioides difficile colitis Z86.19    Bilateral kidney stones N20.0       Isolation/Infection:   Isolation            C Diff Contact          Patient Infection Status       Infection Onset Added Last Indicated Last Indicated By Review Planned Expiration Resolved Resolved By    C-diff Rule Out  09/07/22 09/07/22 Bonnie Pizano RN 09/14/22 09/17/22      MRSA 02/03/21 02/05/21 04/23/22 Culture, Wound        2/2021 blood  Foot 4/3/21    VRE 08/24/20 08/27/20 08/24/20 Culture, Anaerobic and Aerobic        Foot - 12/2020    Resolved    C-diff Rule Out 09/07/22 09/07/22 09/07/22 Gastrointestinal Panel, Molecular (Ordered)   09/07/22 Bonnie Pizano RN    C-diff Rule Out 11/24/21 11/25/21 11/25/21 C DIFF TOXIN/ANTIGEN (Ordered)   11/26/21 Rule-Out Test Resulted    COVID-19 (Rule Out) 11/03/21 11/03/21 11/03/21 COVID-19, Rapid (Ordered)   11/03/21 Rule-Out Test Resulted    COVID-19 (Rule Out) 08/29/21 08/29/21 08/29/21 Respiratory Panel, Molecular, with COVID-19 (Restricted: peds pts or suitable admitted adults) (Ordered)   08/30/21 Jennifer Vences RN    COVID-19 (Rule Out) 08/29/21 08/29/21 08/29/21 COVID-19, Rapid (Ordered)   08/29/21 Rule-Out Test Resulted    C-diff Rule Out 05/25/21 05/25/21 05/25/21 C DIFF TOXIN/ANTIGEN (Ordered) 05/27/21 Jazzmine Montoya RN    C-diff Rule Out 01/11/21 01/11/21 01/11/21 C DIFF TOXIN/ANTIGEN (Ordered)   02/05/21 Tennis [place], RN    COVID-19 (Rule Out) 08/22/20 08/22/20 08/23/20 COVID-19 (Ordered)   08/23/20 Rule-Out Test Resulted    COVID-19 (Rule Out) 08/01/20 08/01/20 08/01/20 COVID-19 (Ordered)   08/02/20 Morelia Burks RN    Test discontinued - negative result this admission    MRSA  01/02/17 01/02/17 Morelia Burks RN   07/31/20 Morelia Burks RN    2 negative nasal screen - 2020  Foot - 6/2018            Nurse Assessment:  Last Vital Signs: BP (!) 128/53   Pulse 71   Temp 97.6 °F (36.4 °C) (Oral)   Resp 16   Ht 6' (1.829 m)   Wt 150 lb (68 kg)   SpO2 98%   BMI 20.34 kg/m²     Last documented pain score (0-10 scale): Pain Level: 8  Last Weight:   Wt Readings from Last 1 Encounters:   09/06/22 150 lb (68 kg)     Mental Status:  oriented and alert    IV Access:  - None    Nursing Mobility/ADLs:  Walking   Dependent  Transfer  Dependent  Bathing  Dependent  Dressing  Dependent  Toileting  Dependent  Feeding  Independent  Med Admin  Independent  Med Delivery   whole    Wound Care Documentation and Therapy:  Wound 11/03/21 Leg Left BKA stump wound (Active)   Number of days: 307       Wound Knee Posterior (Active)   Number of days:         Elimination:  Continence: Bowel: Yes  Bladder: Yes  Urinary Catheter: None   Colostomy/Ileostomy/Ileal Conduit: No       Date of Last BM: 9-10-22    Intake/Output Summary (Last 24 hours) at 9/7/2022 1138  Last data filed at 9/7/2022 0811  Gross per 24 hour   Intake 1847.86 ml   Output 25 ml   Net 1822.86 ml     I/O last 3 completed shifts: In: 1780.5 [P.O.:200; I.V.:574.9; IV Piggyback:1005.5]  Out: -     Safety Concerns:      At Risk for Falls    Impairments/Disabilities:      Amputation - left and right below knee amputations    Nutrition Therapy:  Current Nutrition Therapy:   - Oral Diet:  Carb Control 4 carbs/meal (1800kcals/day), Low Fat, Low Sodium (2gm), and low cholesterol, high fiber    Routes of Feeding: Oral  Liquids: No Restrictions  Daily Fluid Restriction: no  Last Modified Barium Swallow with Video (Video Swallowing Test): not done    Treatments at the Time of Hospital Discharge:   Respiratory Treatments:   Oxygen Therapy:  is not on home oxygen therapy. Ventilator:    - No ventilator support    Rehab Therapies: Physical Therapy and Occupational Therapy  Weight Bearing Status/Restrictions: No weight bearing restrictions  Other Medical Equipment (for information only, NOT a DME order):  wheelchair  Other Treatments: Skilled nursing assessment  Med teaching and compliance  HHA to assist with personal care  Follow up with Dr. Huggins [facility] urology-call Monday for follow up appointment; bilateral ureteral stents placed. Patient's personal belongings (please select all that are sent with patient):  Stump sleeve, prosthetics    RN SIGNATURE:  Electronically signed by Blane Escalante RN on 9/7/22 at 7:34 PM EDT    CASE MANAGEMENT/SOCIAL WORK SECTION    Inpatient Status Date: ***    Readmission Risk Assessment Score:  Readmission Risk              Risk of Unplanned Readmission:  44           Discharging to Facility/ Agency   Name: [address] [place] [address]  Address:  Phone: [phone]  Fax: [phone]      / signature: Electronically signed by Skyler Grande RN on 9/7/22 at 12:52 PM EDT    PHYSICIAN SECTION    Prognosis: Good    Condition at Discharge: Stable    Rehab Potential (if transferring to Rehab): Good    Recommended Labs or Other Treatments After Discharge: BMP in 1 week    Physician Certification: I certify the above information and transfer of Suzy Heredia  is necessary for the continuing treatment of the diagnosis listed and that he requires Home Care for less 30 days.      Update Admission H&P: No change in H&P    PHYSICIAN SIGNATURE:  Electronically signed by Yadi Varela MD on 9/7/22 at 11:40 AM EDT

## 2022-09-07 NOTE — PLAN OF CARE
Problem: Chronic Conditions and Co-morbidities  Goal: Patient's chronic conditions and co-morbidity symptoms are monitored and maintained or improved  9/7/2022 1937 by Stevo Archer RN  Outcome: Progressing  Flowsheets (Taken 9/7/2022 0940)  Care Plan - Patient's Chronic Conditions and Co-Morbidity Symptoms are Monitored and Maintained or Improved: Monitor and assess patient's chronic conditions and comorbid symptoms for stability, deterioration, or improvement  9/7/2022 0638 by Les Harrell RN  Outcome: Progressing     Problem: Discharge Planning  Goal: Discharge to home or other facility with appropriate resources  9/7/2022 1937 by Stevo Archer RN  Outcome: Progressing  Flowsheets (Taken 9/7/2022 0940)  Discharge to home or other facility with appropriate resources:   Identify barriers to discharge with patient and caregiver   Arrange for needed discharge resources and transportation as appropriate   Identify discharge learning needs (meds, wound care, etc)  9/7/2022 0638 by Les Harrell RN  Outcome: Progressing     Problem: Pain  Goal: Verbalizes/displays adequate comfort level or baseline comfort level  9/7/2022 1937 by Stevo Archer RN  Outcome: Progressing  9/7/2022 0638 by Les Harrell RN  Outcome: Progressing     Problem: Skin/Tissue Integrity  Goal: Absence of new skin breakdown  Description: 1. Monitor for areas of redness and/or skin breakdown  2. Assess vascular access sites hourly  3. Every 4-6 hours minimum:  Change oxygen saturation probe site  4. Every 4-6 hours:  If on nasal continuous positive airway pressure, respiratory therapy assess nares and determine need for appliance change or resting period.   9/7/2022 1937 by Stevo Archer RN  Outcome: Progressing  9/7/2022 0638 by Les Harrell RN  Outcome: Progressing     Problem: ABCDS Injury Assessment  Goal: Absence of physical injury  9/7/2022 1937 by Stevo Archer RN  Outcome: Progressing  9/7/2022 0638 by Les Harrell RN  Outcome:
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
## 2022-09-07 NOTE — OP NOTE
Operative Note      Patient: Romero Parrish  YOB: 1955  MRN: [id number]    Date of Procedure: 9/7/2022    Pre-Op Diagnosis: BILATERAL URETERAL STONES  Bilateral hydronephrosis  Post-Op Diagnosis: Same       Procedure(s):  CYSTOSCOPY BILATERALRETROGRADE PYELOGRAM  BILATERAL STENT INSERTION    Surgeon(s):  Nicole Melo MD    Assistant:   * No surgical staff found *    Anesthesia: Monitor Anesthesia Care    Estimated Blood Loss (mL): Minimal    Complications: None    Specimens:   ID Type Source Tests Collected by Time Destination   1 : CYSTO URINE Urine Urine, Cystoscopic URINALYSIS WITH REFLEX TO CULTURE Nicole Melo MD 9/7/2022 0355        Implants:  Implant Name Type Inv. Item Serial No.  Lot No. LRB No. Used Action   STENT URET 7FR L28CM PERCFLX HYDR+ DBL PGTL THRD 2 - [id number]  STENT URET 7FR L28CM PERCFLX HYDR+ DBL PGTL THRD 2  [employer] [facility] [id number] Left 1 Implanted   STENT URET 7FR L28CM PERCFLX HYDR+ DBL PGTL THRD 2 - [id number]  STENT URET 7FR L28CM PERCFLX HYDR+ DBL PGTL THRD 2  [employer]- [id number] Right 1 Implanted         Drains: * No LDAs found *    Findings: Indications: This patient is 61years old presented to the emergency room with acute kidney injury, elevated white count, patient is diabetic and CT imaging documented bilateral ureteral obstruction. Patient scheduled emergently for cystoscopy pyelogram and stent placement    Detailed Description of Procedure:   Patient was brought to the operating room, positioned in dorsolithotomy proper patient identification procedure identification prepping and draping we entered the bladder with the cystoscope, prostate hypertrophy identified, the bladder is trabeculated. Urine sample was obtained for culture and sensitivity. We used a #8 cone-tip ureteral catheter and bilateral retrograde pyelography confirmed the obstruction mid ureter on the left and proximal ureter on the right.     Bilateral insertion of a Glidewire was carried out, this was followed by insertion of a #7 double-J stent positioned in the renal pelvis on the left with the distal end in the bladder. We then negotiated the stent also on the right side with decompression of both kidneys. Bladder was then emptied the scope removed the patient returned to recovery room in stable condition.     Recommendations: Continue with antibiotic therapy Rocephin pending culture results    Repeat renal function studies    We will schedule electively for laser lithotripsy as outpatient    Electronically signed by Guillermo Eddy MD on 9/7/2022 at 4:10 AM

## 2022-09-07 NOTE — ACP (ADVANCE CARE PLANNING)
Advance Care Planning     Advance Care Planning Activator (Inpatient)  Conversation Note      Date of ACP Conversation: 9/7/2022     Conversation Conducted with: Patient with Decision Making Capacity    ACP Activator: Kelli Bangura RN    {When Decision Maker makes decisions on behalf of the incapacitated patient: Decision Maker is asked to consider and make decisions based on patient values, known preferences, or best interests. Health Care Decision Maker:     Current Designated Health Care Decision Maker:     Primary Decision Maker: Erin Schwartz - [phone]  Click here to complete Healthcare Decision Makers including section of the Healthcare Decision Maker Relationship (ie \"Primary\")      Care Preferences    Ventilation: \"If you were in your present state of health and suddenly became very ill and were unable to breathe on your own, what would your preference be about the use of a ventilator (breathing machine) if it were available to you? \"      Would the patient desire the use of ventilator (breathing machine)?: yes    \"If your health worsens and it becomes clear that your chance of recovery is unlikely, what would your preference be about the use of a ventilator (breathing machine) if it were available to you? \"     Would the patient desire the use of ventilator (breathing machine)?: Yes      Resuscitation  \"CPR works best to restart the heart when there is a sudden event, like a heart attack, in someone who is otherwise healthy. Unfortunately, CPR does not typically restart the heart for people who have serious health conditions or who are very sick. \"    \"In the event your heart stopped as a result of an underlying serious health condition, would you want attempts to be made to restart your heart (answer \"yes\" for attempt to resuscitate) or would you prefer a natural death (answer \"no\" for do not attempt to resuscitate)? \" yes       [] Yes   [x] No   Educated Patient / Decision Maker regarding differences between Advance Directives and portable DNR orders.     Length of ACP Conversation in minutes:  5    Conversation Outcomes:  [x] ACP discussion completed  [] Existing advance directive reviewed with patient; no changes to patient's previously recorded wishes  [] New Advance Directive completed  [] Portable Do Not Rescitate prepared for Provider review and signature  [] POLST/POST/MOLST/MOST prepared for Provider review and signature      Follow-up plan:    [] Schedule follow-up conversation to continue planning  [] Referred individual to Provider for additional questions/concerns   [] Advised patient/agent/surrogate to review completed ACP document and update if needed with changes in condition, patient preferences or care setting    [] This note routed to one or more involved healthcare providers No

## 2022-09-07 NOTE — ED NOTES
Patient daughter, Esperanza called, updated her on patient condition, testing completed and results. Daughter thinks patient has IBS and his PCP should order a colonoscopy.      Arti Mims., RN  93/02/08 2400

## 2022-09-08 ENCOUNTER — APPOINTMENT (OUTPATIENT)
Dept: CT IMAGING | Age: 67
DRG: 661 | End: 2022-09-08
Payer: MEDICARE

## 2022-09-08 LAB
ABSOLUTE EOS #: 0.27 K/UL (ref 0–0.44)
ABSOLUTE IMMATURE GRANULOCYTE: 0.07 K/UL (ref 0–0.3)
ABSOLUTE LYMPH #: 2.19 K/UL (ref 1.1–3.7)
ABSOLUTE MONO #: 0.85 K/UL (ref 0.1–1.2)
ALBUMIN SERPL-MCNC: 2.9 G/DL (ref 3.5–5.2)
ALP BLD-CCNC: 127 U/L (ref 40–129)
ALT SERPL-CCNC: 14 U/L (ref 5–41)
ANION GAP SERPL CALCULATED.3IONS-SCNC: 7 MMOL/L (ref 9–17)
AST SERPL-CCNC: 7 U/L
BASOPHILS # BLD: 1 % (ref 0–2)
BASOPHILS ABSOLUTE: 0.08 K/UL (ref 0–0.2)
BILIRUB SERPL-MCNC: <0.1 MG/DL (ref 0.3–1.2)
BILIRUBIN URINE: NEGATIVE
BUN BLDV-MCNC: 26 MG/DL (ref 8–23)
BUN/CREAT BLD: 19 (ref 9–20)
CALCIUM SERPL-MCNC: 8.5 MG/DL (ref 8.6–10.4)
CHLORIDE BLD-SCNC: 114 MMOL/L (ref 98–107)
CO2: 23 MMOL/L (ref 20–31)
COLOR: ABNORMAL
CREAT SERPL-MCNC: 1.38 MG/DL (ref 0.7–1.2)
CULTURE: NO GROWTH
EKG ATRIAL RATE: 72 BPM
EKG P AXIS: 59 DEGREES
EKG P-R INTERVAL: 138 MS
EKG Q-T INTERVAL: 394 MS
EKG QRS DURATION: 78 MS
EKG QTC CALCULATION (BAZETT): 431 MS
EKG R AXIS: 45 DEGREES
EKG T AXIS: 58 DEGREES
EKG VENTRICULAR RATE: 72 BPM
EOSINOPHILS RELATIVE PERCENT: 3 % (ref 1–4)
EPITHELIAL CELLS UA: NORMAL /HPF (ref 0–5)
GFR AFRICAN AMERICAN: >60 ML/MIN
GFR NON-AFRICAN AMERICAN: 52 ML/MIN
GFR SERPL CREATININE-BSD FRML MDRD: ABNORMAL ML/MIN/{1.73_M2}
GLUCOSE BLD-MCNC: 115 MG/DL (ref 75–110)
GLUCOSE BLD-MCNC: 121 MG/DL (ref 70–99)
GLUCOSE BLD-MCNC: 215 MG/DL (ref 75–110)
GLUCOSE BLD-MCNC: 220 MG/DL (ref 75–110)
GLUCOSE BLD-MCNC: 234 MG/DL (ref 75–110)
GLUCOSE URINE: ABNORMAL
HCT VFR BLD CALC: 32.5 % (ref 40.7–50.3)
HEMOGLOBIN: 9.4 G/DL (ref 13–17)
IMMATURE GRANULOCYTES: 1 %
KETONES, URINE: NEGATIVE
LEUKOCYTE ESTERASE, URINE: ABNORMAL
LYMPHOCYTES # BLD: 22 % (ref 24–43)
MCH RBC QN AUTO: 26 PG (ref 25.2–33.5)
MCHC RBC AUTO-ENTMCNC: 28.9 G/DL (ref 28.4–34.8)
MCV RBC AUTO: 90 FL (ref 82.6–102.9)
MONOCYTES # BLD: 9 % (ref 3–12)
NITRITE, URINE: NEGATIVE
NRBC AUTOMATED: 0 PER 100 WBC
PDW BLD-RTO: 15.9 % (ref 11.8–14.4)
PH UA: 6 (ref 5–8)
PLATELET # BLD: 325 K/UL (ref 138–453)
PMV BLD AUTO: 9.8 FL (ref 8.1–13.5)
POTASSIUM SERPL-SCNC: 4.3 MMOL/L (ref 3.7–5.3)
PROTEIN UA: ABNORMAL
RBC # BLD: 3.61 M/UL (ref 4.21–5.77)
RBC # BLD: ABNORMAL 10*6/UL
RBC UA: NORMAL /HPF (ref 0–2)
SEG NEUTROPHILS: 64 % (ref 36–65)
SEGMENTED NEUTROPHILS ABSOLUTE COUNT: 6.51 K/UL (ref 1.5–8.1)
SODIUM BLD-SCNC: 144 MMOL/L (ref 135–144)
SPECIFIC GRAVITY UA: 1.02 (ref 1–1.03)
SPECIMEN DESCRIPTION: NORMAL
TOTAL PROTEIN: 5.5 G/DL (ref 6.4–8.3)
TURBIDITY: ABNORMAL
URINE HGB: ABNORMAL
UROBILINOGEN, URINE: NORMAL
WBC # BLD: 10 K/UL (ref 3.5–11.3)
WBC UA: NORMAL /HPF (ref 0–5)

## 2022-09-08 PROCEDURE — 2580000003 HC RX 258: Performed by: STUDENT IN AN ORGANIZED HEALTH CARE EDUCATION/TRAINING PROGRAM

## 2022-09-08 PROCEDURE — 97535 SELF CARE MNGMENT TRAINING: CPT

## 2022-09-08 PROCEDURE — 2580000003 HC RX 258: Performed by: NURSE PRACTITIONER

## 2022-09-08 PROCEDURE — 74176 CT ABD & PELVIS W/O CONTRAST: CPT

## 2022-09-08 PROCEDURE — 1200000000 HC SEMI PRIVATE

## 2022-09-08 PROCEDURE — 6370000000 HC RX 637 (ALT 250 FOR IP): Performed by: NURSE PRACTITIONER

## 2022-09-08 PROCEDURE — 97110 THERAPEUTIC EXERCISES: CPT

## 2022-09-08 PROCEDURE — 6370000000 HC RX 637 (ALT 250 FOR IP): Performed by: UROLOGY

## 2022-09-08 PROCEDURE — 81001 URINALYSIS AUTO W/SCOPE: CPT

## 2022-09-08 PROCEDURE — 6360000002 HC RX W HCPCS: Performed by: STUDENT IN AN ORGANIZED HEALTH CARE EDUCATION/TRAINING PROGRAM

## 2022-09-08 PROCEDURE — 94640 AIRWAY INHALATION TREATMENT: CPT

## 2022-09-08 PROCEDURE — 6360000002 HC RX W HCPCS: Performed by: NURSE PRACTITIONER

## 2022-09-08 PROCEDURE — 80053 COMPREHEN METABOLIC PANEL: CPT

## 2022-09-08 PROCEDURE — 94761 N-INVAS EAR/PLS OXIMETRY MLT: CPT

## 2022-09-08 PROCEDURE — 82947 ASSAY GLUCOSE BLOOD QUANT: CPT

## 2022-09-08 PROCEDURE — 36415 COLL VENOUS BLD VENIPUNCTURE: CPT

## 2022-09-08 PROCEDURE — 51700 IRRIGATION OF BLADDER: CPT

## 2022-09-08 PROCEDURE — 85025 COMPLETE CBC W/AUTO DIFF WBC: CPT

## 2022-09-08 RX ORDER — FAMOTIDINE 20 MG/1
20 TABLET, FILM COATED ORAL DAILY
Status: DISCONTINUED | OUTPATIENT
Start: 2022-09-08 | End: 2022-09-08

## 2022-09-08 RX ORDER — LIDOCAINE HYDROCHLORIDE 20 MG/ML
JELLY TOPICAL ONCE
Status: COMPLETED | OUTPATIENT
Start: 2022-09-08 | End: 2022-09-08

## 2022-09-08 RX ORDER — PHENOL 1.4 %
1 AEROSOL, SPRAY (ML) MUCOUS MEMBRANE NIGHTLY
Status: ON HOLD | COMMUNITY
End: 2022-09-08 | Stop reason: HOSPADM

## 2022-09-08 RX ORDER — FAMOTIDINE 20 MG/1
20 TABLET, FILM COATED ORAL 2 TIMES DAILY
Status: DISCONTINUED | OUTPATIENT
Start: 2022-09-08 | End: 2022-09-10 | Stop reason: HOSPADM

## 2022-09-08 RX ADMIN — METOPROLOL TARTRATE 25 MG: 25 TABLET, FILM COATED ORAL at 21:41

## 2022-09-08 RX ADMIN — TAMSULOSIN HYDROCHLORIDE 0.4 MG: 0.4 CAPSULE ORAL at 09:43

## 2022-09-08 RX ADMIN — MORPHINE SULFATE 4 MG: 4 INJECTION, SOLUTION INTRAMUSCULAR; INTRAVENOUS at 19:28

## 2022-09-08 RX ADMIN — SODIUM CHLORIDE: 9 INJECTION, SOLUTION INTRAVENOUS at 19:29

## 2022-09-08 RX ADMIN — HYDROCODONE BITARTRATE AND ACETAMINOPHEN 1 TABLET: 5; 325 TABLET ORAL at 16:56

## 2022-09-08 RX ADMIN — FERROUS SULFATE TAB EC 325 MG (65 MG FE EQUIVALENT) 325 MG: 325 (65 FE) TABLET DELAYED RESPONSE at 09:43

## 2022-09-08 RX ADMIN — MORPHINE SULFATE 4 MG: 4 INJECTION, SOLUTION INTRAMUSCULAR; INTRAVENOUS at 21:41

## 2022-09-08 RX ADMIN — MORPHINE SULFATE 4 MG: 4 INJECTION, SOLUTION INTRAMUSCULAR; INTRAVENOUS at 13:32

## 2022-09-08 RX ADMIN — INSULIN GLARGINE 30 UNITS: 100 INJECTION, SOLUTION SUBCUTANEOUS at 16:59

## 2022-09-08 RX ADMIN — MORPHINE SULFATE 4 MG: 4 INJECTION, SOLUTION INTRAMUSCULAR; INTRAVENOUS at 05:59

## 2022-09-08 RX ADMIN — MORPHINE SULFATE 4 MG: 4 INJECTION, SOLUTION INTRAMUSCULAR; INTRAVENOUS at 11:20

## 2022-09-08 RX ADMIN — FAMOTIDINE 20 MG: 20 TABLET, FILM COATED ORAL at 09:43

## 2022-09-08 RX ADMIN — HYDROCODONE BITARTRATE AND ACETAMINOPHEN 1 TABLET: 5; 325 TABLET ORAL at 09:43

## 2022-09-08 RX ADMIN — ONDANSETRON 4 MG: 4 TABLET, ORALLY DISINTEGRATING ORAL at 09:37

## 2022-09-08 RX ADMIN — FAMOTIDINE 20 MG: 20 TABLET, FILM COATED ORAL at 03:59

## 2022-09-08 RX ADMIN — ASPIRIN 81 MG: 81 TABLET, COATED ORAL at 09:43

## 2022-09-08 RX ADMIN — METOPROLOL TARTRATE 25 MG: 25 TABLET, FILM COATED ORAL at 09:43

## 2022-09-08 RX ADMIN — ATORVASTATIN CALCIUM 10 MG: 10 TABLET, FILM COATED ORAL at 21:41

## 2022-09-08 RX ADMIN — APIXABAN 5 MG: 5 TABLET, FILM COATED ORAL at 21:41

## 2022-09-08 RX ADMIN — INSULIN LISPRO 1 UNITS: 100 INJECTION, SOLUTION INTRAVENOUS; SUBCUTANEOUS at 17:00

## 2022-09-08 RX ADMIN — CEFTRIAXONE SODIUM 1000 MG: 1 INJECTION, POWDER, FOR SOLUTION INTRAMUSCULAR; INTRAVENOUS at 11:22

## 2022-09-08 RX ADMIN — APIXABAN 5 MG: 5 TABLET, FILM COATED ORAL at 09:43

## 2022-09-08 RX ADMIN — BUDESONIDE AND FORMOTEROL FUMARATE DIHYDRATE 2 PUFF: 160; 4.5 AEROSOL RESPIRATORY (INHALATION) at 09:29

## 2022-09-08 RX ADMIN — BUDESONIDE AND FORMOTEROL FUMARATE DIHYDRATE 2 PUFF: 160; 4.5 AEROSOL RESPIRATORY (INHALATION) at 20:40

## 2022-09-08 RX ADMIN — LIDOCAINE HYDROCHLORIDE: 20 JELLY TOPICAL at 12:55

## 2022-09-08 RX ADMIN — FAMOTIDINE 20 MG: 20 TABLET, FILM COATED ORAL at 21:41

## 2022-09-08 ASSESSMENT — PAIN DESCRIPTION - ORIENTATION
ORIENTATION: LEFT
ORIENTATION: LEFT;RIGHT
ORIENTATION: RIGHT;LEFT
ORIENTATION: RIGHT;LEFT

## 2022-09-08 ASSESSMENT — PAIN DESCRIPTION - LOCATION
LOCATION: FLANK
LOCATION: FLANK
LOCATION: PENIS;FLANK
LOCATION: FLANK

## 2022-09-08 ASSESSMENT — PAIN - FUNCTIONAL ASSESSMENT: PAIN_FUNCTIONAL_ASSESSMENT: ACTIVITIES ARE NOT PREVENTED

## 2022-09-08 ASSESSMENT — PAIN DESCRIPTION - DESCRIPTORS
DESCRIPTORS: DISCOMFORT;ACHING
DESCRIPTORS: ACHING
DESCRIPTORS: SHARP

## 2022-09-08 ASSESSMENT — PAIN SCALES - GENERAL
PAINLEVEL_OUTOF10: 8

## 2022-09-08 NOTE — PROGRESS NOTES
Transitions of Care Pharmacy Service   Medication Review    The patient's list of current home medications has been reviewed. Source(s) of information: Patient, sure script refill report, Victoriano galeana medication list     Based on information provided by the above source(s), I have updated the patient's home med list as described below. Please review the ACTION REQUESTED section of this note for any discrepancies on current hospital orders. I changed or updated the following medications on the patient's home medication list:  Discontinued mirtazapine   Probiotic Product   oxyCODONE-acetaminophen   SITagliptin  pregabalin   nystatin   Multiple Vitamins-Minerals      Added Melatonin      Adjusted   hydrOXYzine      Other Notes Unable to confirm insulin dosing regimen at this time. PROVIDER ACTION REQUESTED  Medications that need to be addressed by a physician/nurse practitioner:    Medication Action Requested     NONE     NONE         Please feel free to call me with any questions about this encounter. Thank you. This note will be reviewed and co-signed by the [facility] Emergency Department Pharmacist.    Amanda Schuster PharmD  [facility] Emergency Department Pharmacy Service  Phone:  [phone]  Fax: [phone]      Electronically signed by Amanda Schuster on 9/8/2022 at 1:24 PM     Prior to Admission medications    Medication Sig Start Date End Date Taking?  Authorizing Provider   metoprolol tartrate (LOPRESSOR) 25 MG tablet Take 1 tablet by mouth 2 times daily 9/8/22  Yes Sofia Rodriguez MD   Melatonin 10 MG TABS Take 1 tablet by mouth nightly   Yes Historical Provider, MD   tamsulosin (FLOMAX) 0.4 MG capsule Take 1 capsule by mouth 2 times daily 6/21/22   MARCELO Platt CNP   insulin detemir (LEVEMIR) 100 UNIT/ML injection vial Inject 30 units, subcutaenously daily with diner 6/21/22   MARCELO Platt CNP   insulin aspart (NOVOLOG FLEXPEN) 100 UNIT/ML injection pen Check glucose before meals and inject according to scale.  Insulin sliding scale Glucose  = 0 units, 140-199 = 2 units, 200-249 = 4 units, 250-299 = 6 units, 300-349 = 8 units, 350-399 = 10 units, 400-449 = 12 units, over 450 = 12 units and call office 6/21/22   Katrinel Betters, APRN - CNP   ferrous sulfate (IRON 325) 325 (65 Fe) MG tablet Take 1 tablet by mouth daily (with breakfast) 3/24/22   Mickiel Betters, APRN - CNP   atorvastatin (LIPITOR) 10 MG tablet Take 1 tablet by mouth nightly 3/24/22   Mickiel Betters, APRN - CNP   albuterol sulfate HFA (VENTOLIN HFA) 108 (90 Base) MCG/ACT inhaler Inhale 2 puffs into the lungs every 6 hours as needed for Wheezing 3/24/22   Mickiel Betters, APRN - CNP   apixaban (ELIQUIS) 5 MG TABS tablet Take 1 tablet by mouth 2 times daily 3/24/22   Mickiel Betters, APRN - CNP   hydrOXYzine (ATARAX) 25 MG tablet Take 1 tablet by mouth daily  Patient taking differently: Take 25 mg by mouth every 8 hours as needed 3/24/22   Mickiel Betters, APRN - CNP   budesonide-formoterol [facility]) 160-4.5 MCG/ACT AERO Inhale 2 puffs into the lungs 2 times daily 3/24/22   Mickiel Betters, APRN - CNP   famotidine (PEPCID) 20 MG tablet Take 1 tablet by mouth 2 times daily 3/24/22   Mikekiel Betters, APRN - CNP   glucose monitoring (FREESTYLE) kit 1 kit by Does not apply route daily 3/24/22   [facility] - CNP   Lancets MISC 1 each by Does not apply route 3 times daily 3/24/22   [facility], APR[facility] - CNP   aspirin EC 81 MG EC tablet Take 81 mg by mouth daily    Historical Provider, MD

## 2022-09-08 NOTE — PROGRESS NOTES
Mallika Beckford   Urology Progress Note            Subjective: Follow-up bilateral ureteral stent bilateral obstructing calculi    Patient Vitals for the past 24 hrs:   BP Temp Temp src Pulse Resp SpO2 Weight   09/08/22 1510 (!) 140/55 97.7 °F (36.5 °C) Oral 72 17 99 % --   09/08/22 1332 -- -- -- -- 14 -- --   09/08/22 1141 (!) 137/58 98.1 °F (36.7 °C) Oral 73 16 97 % --   09/08/22 1120 -- -- -- -- 14 -- --   09/08/22 0929 -- -- -- -- -- 96 % --   09/08/22 0736 (!) 148/55 98.1 °F (36.7 °C) Oral 73 17 96 % --   09/08/22 0450 (!) 112/58 97.7 °F (36.5 °C) Oral 80 16 97 % --   09/08/22 0121 -- -- -- -- -- -- 149 lb 9.6 oz (67.9 kg)   09/08/22 0009 (!) 108/53 97.7 °F (36.5 °C) Oral 71 -- 96 % --   09/07/22 2143 -- -- -- 84 18 96 % --   09/07/22 1927 (!) 142/63 97.7 °F (36.5 °C) Oral (!) 101 16 96 % --       Intake/Output Summary (Last 24 hours) at 9/8/2022 1658  Last data filed at 9/8/2022 1340  Gross per 24 hour   Intake 820.27 ml   Output 2375 ml   Net -1554.73 ml       Recent Labs     09/06/22  2030 09/07/22  0608 09/08/22  0548   WBC 15.1* 10.7 10.0   HGB 12.8* 10.7* 9.4*   HCT 42.9 36.5* 32.5*   MCV 87.9 89.5 90.0    367 325     Recent Labs     09/07/22  0608 09/07/22  1321 09/08/22  0548    139 144   K 4.4 4.3 4.3   * 108* 114*   CO2 23 23 23   BUN 25* 26* 26*   CREATININE 2.04* 1.77* 1.38*       Recent Labs     09/07/22  0355 09/08/22  1305   COLORU Yellow Red*   PHUR 5.5 6.0   WBCUA None 5 TO 10   RBCUA 5 TO 10 TOO NUMEROUS TO COUNT   BACTERIA RARE*  --    SPECGRAV 1.015 1.025   LEUKOCYTESUR NEGATIVE TRACE*   UROBILINOGEN Normal Normal   BILIRUBINUR NEGATIVE NEGATIVE       Additional Lab/culture results:    Physical Exam: Stents in place, patient with gross hematuria, dropping hemoglobin, we recommended Quesada catheter and stent evaluation, by repeat CT imaging    Interval Imaging Findings:  Impression   1.   Right double-J ureteral stent in place with minimal residual   hydronephrosis. Repositioning of 1 cm obstructing stone now within a lower   pole calyx. 2.  Left double-J ureteral stent in place with similar position of previously   seen 0.7 cm proximal left ureteral stone. Resolution of left hydronephrosis. 3.  Interval improvement of bilateral perinephric stranding. 4.  Bilateral nephrolithiasis again demonstrated. 5.  Patchy peripheral airspace disease appears more prominent in the   interval, which may represent multifocal inflammatory process (including   aspiration) or infection.        6.  Moderate size hiatal hernia       Impression: Gross hematuria, bilateral ureteral stent    Renal function improving  Patient Active Problem List   Diagnosis    Type 2 diabetes mellitus with diabetic polyneuropathy, with long-term current use of insulin ([facility])    Neuropathic pain, leg    Diabetic polyneuropathy ([facility])    Allergic rhinitis    Tobacco dependence    Edentulous    Dysphagia    Lung nodules    Esophageal cancer (HCC)    Fracture of humerus, left, closed    Closed fracture of humerus    DM type 2 with diabetic peripheral neuropathy (HCC)    COPD without exacerbation (HCC)    HLD (hyperlipidemia)    Gastroesophageal reflux disease without esophagitis    Chronic pain syndrome    Marijuana use    History of colon polyps    Functional diarrhea    Sepsis due to methicillin resistant Staphylococcus aureus (MRSA) ([facility])    History of esophageal cancer    Osteomyelitis, chronic, ankle or foot ([address]    PAD (peripheral artery disease) ([address]    Other pulmonary embolism without acute cor pulmonale (HCC)    Carotid stenosis, asymptomatic, bilateral    Lower limb amputation status    Fx humeral neck, right, closed, initial encounter    Transient hypotension    Constipation due to opioid therapy    Acute kidney injury superimposed on CKD ([address]    Complication of below knee amputation stump (HCC)    COPD exacerbation (HCC)    Hyponatremia    Pain, phantom limb ([address]    S/P BKA (below knee amputation) bilateral (HCC)    Hyperglycemia    Moderate protein malnutrition ([address]    Essential hypertension    Uncontrolled type 2 diabetes mellitus with hyperglycemia ([address]    History of pulmonary embolism - 2017    Atelectasis    Uncontrolled type 2 diabetes mellitus with foot ulcer (HCC)    Azotemia    Anemia, normocytic normochromic    Drug-induced constipation    Osteomyelitis of metatarsals of left foot (HCC)    Diabetic foot infection ([address]    Noncompliance    Type 1 diabetes mellitus with diabetic foot infection ([address]    Acute osteomyelitis of left foot (HCC)    Cellulitis of left foot    Mild malnutrition (HCC)    Hypocalcemia    Hypokalemia    Moderate malnutrition (HCC)    History of below knee amputation, right ([address]    Foot ulcer with fat layer exposed, left (HCC)    Chronic refractory osteomyelitis of left foot (HCC)    Sepsis ([address]    Pyogenic inflammation of bone (HCC)    Cellulitis of left lower extremity    History of below knee amputation, left (HCC)    Leg ulcer, left, with fat layer exposed ([address]    S/P amputation    Tobacco abuse    Pneumonia of right lower lobe due to infectious organism    Abdominal pain    Cannabis abuse    Parapneumonic effusion    Hiatus hernia -large    Metabolic encephalopathy    Altered mental status    Hyperkalemia    Alcoholic intoxication without complication ([address]    Acute encephalopathy    Depression    History of Clostridioides difficile colitis    Bilateral kidney stones    Ureteric stone       Plan: Maintain indwelling Quesada, and bladder irrigation, we will discontinue in a.m. if clear    Jem Pandey MD  4:58 PM 9/8/2022
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
## 2022-09-08 NOTE — PROGRESS NOTES
Physician Progress Note      PATIENT:               Meghan Mcdonald  CSN #:                  [id number]  :                       1955  ADMIT DATE:       2022 7:04 PM  [address] [place] DATE:  RESPONDING  PROVIDER #:        Les Rosa MD          QUERY TEXT:    Patient admitted with bilateral kidney stones. Noted documentation of Acute   Kidney Injury in [place] on 22. In order to support the diagnosis of LUMA,   please include additional clinical indicators in your documentation. ? Or   please document if the diagnosis of LUMA has been ruled out after further   study. The medical record reflects the following:  Risk Factors: PMH of CKD, Kidney stones  Clinical Indicators: Creatinine on admission of 1.79 on 22, then 2.04 on   22, then 1.77 on 22, and 1.38 on 22, note of LUMA on CKD, prior   creatinine of 2.02 in   Treatment: IVF, labs    Thank you,  Renetta Schulz RN    Defined by Kidney Disease Improving Global Outcomes (KDIGO) clinical practice   guideline for acute kidney injury:  -Increase in SCr by greater than or equal to 0.3 mg/dl within 48 hours; or  -Increase or decrease in SCr to greater than or equal to 1.5 times baseline,   which is known or presumed to have occurred within the prior 7 days; or  -Urine volume < 0.5ml/kg/h for 6 hours. Options provided:  -- Acute kidney injury evidenced by, Please document evidence as well as a   numerical baseline creatinine, if known. -- Currently resolved acute kidney injury was evidenced by, Please document   evidence as well as a numerical baseline creatinine, if known. -- Acute kidney injury ruled out after study  -- Other - I will add my own diagnosis  -- Disagree - Not applicable / Not valid  -- Disagree - Clinically unable to determine / Unknown  -- Refer to Clinical Documentation Reviewer    PROVIDER RESPONSE TEXT:    Now resolved acute kidney injury was evidenced by improving cr    Query created by:  Tom Guy on 2022 12:35 PM      Electronically signed by:  Kailee Nelson MD 9/8/2022 12:41 PM

## 2022-09-08 NOTE — PROGRESS NOTES
Physical Therapy  DATE: 2022    NAME: Lizandro Rivera  MRN: [id number]   : 1955    Patient not seen this date for Physical Therapy due to:      [] Cancel by RN or physician due to:    [] Hemodialysis    [] Critical Lab Value Level     [] Blood transfusion in progress    [] Acute or unstable cardiovascular status   _MAP < 55 or more than >115  _HR < 40 or > 130    [] Acute or unstable pulmonary status   -FiO2 > 60%   _RR < 5 or >40    _O2 sats < 85%    [] Strict Bedrest    [] Off Unit for surgery or procedure    [] Off Unit for testing       [] Pending imaging to R/O fracture    [x] Refusal by Patient  Patient states that we can check back in 2 hours. This would be 4:30 pm. Therapist stated we would check back as time allows    [] Other      [] PT being discontinued at this time. Patient independent. No further needs. [] PT being discontinued at this time as the patient has been transferred to hospice care. No further needs.       Sharonda Goldberg, PTA

## 2022-09-08 NOTE — PROGRESS NOTES
[facility] NOTE    Room # 2016/2016-02   Name: Suzy Heredia            Jehovah's witness: [facility]     Reason for visit: Routine    I visited the patient. Admit Date & Time: 9/6/2022  7:04 PM    Assessment:  Suzy Heredia is a 61 y.o. male in the hospital because kidney stones in both kidneys. Upon entering the room pt. Is very lethargic but open his eyes when  calls his name. Intervention:  I introduced myself and my title as  I offered space for patient  to express feelings, needs, and concerns and provided a ministry presence. Pt. Reports that he is taking a great deal of medicine and he is \"foggy\".  offers emotional support and blessing and prayers of healing. Outcome:  Calm and resting. Plan:  Chaplains will remain available to offer spiritual and emotional support as needed. Electronically signed by Deb Khoury on 9/7/2022 at 8:04 PM.  [place]      09/07/22 2001   Encounter Summary   Service Provided For: Patient   Referral/Consult From: [facility] Parent; Children   Last Encounter  09/07/22   Complexity of Encounter Moderate   Begin Time 1716   End Time  1730   Total Time Calculated 14 min   Encounter    Type Initial Screen/Assessment   Spiritual/Emotional needs   Type Spiritual Support   Assessment/Intervention/Outcome   Assessment Calm; Concerns with suffering; Impaired resilience   Intervention Active listening;Explored/Affirmed feelings, thoughts, concerns;Sustaining Presence/Ministry of presence;Prayer (assurance of)/[place]   Outcome Comfort;Receptive

## 2022-09-08 NOTE — PROGRESS NOTES
3 way dailey inserted per order from Dr Isra Nunez. Patient received urojets beforehand. Patient tolerated well. Urine sent to lab per protocol. Irrigation started and draining properly.

## 2022-09-08 NOTE — PLAN OF CARE
Problem: Chronic Conditions and Co-morbidities  Goal: Patient's chronic conditions and co-morbidity symptoms are monitored and maintained or improved  9/8/2022 0231 by Amanda Valle RN  Outcome: Progressing     Problem: Discharge Planning  Goal: Discharge to home or other facility with appropriate resources  9/8/2022 0231 by Amanda Valle RN  Outcome: Progressing     Problem: Pain  Goal: Verbalizes/displays adequate comfort level or baseline comfort level  9/8/2022 0231 by Amanda Valle RN  Outcome: Progressing     Problem: ABCDS Injury Assessment  Goal: Absence of physical injury  9/8/2022 0231 by Amanda Valle RN  Outcome: Progressing     Problem: Safety - Adult  Goal: Free from fall injury  9/8/2022 0231 by Amanda Valle RN  Outcome: Progressing  Flowsheets (Taken 9/7/2022 2130)  Free From Fall Injury: Instruct family/caregiver on patient safety

## 2022-09-08 NOTE — PROGRESS NOTES
Occupational Therapy  Facility/Department: [facility]  Daily Treatment Note  NAME: Liana Centeno  : 1955  MRN: [id number]    Date of Service: 2022    RN Nena Ruvalcaba reports patient is medically stable for therapy treatment this date. Chart reviewed prior to treatment and patient is agreeable for therapy. All lines intact and patient positioned comfortably at end of treatment. All patient needs addressed prior to ending therapy session. Due to recent hospitalization and medical condition, pt would benefit from additional intermittent skilled therapy at time of discharge. Please refer to the AM-PAC score for current functional status. Discharge Recommendations:  Patient would benefit from continued therapy after discharge  OT Equipment Recommendations  Equipment Needed:  (CTA)      Patient Diagnosis(es): The primary encounter diagnosis was Ureteric stone. A diagnosis of Right ureteral calculus was also pertinent to this visit. Assessment    Assessment: Pt presents with deficits in functional mobility and ADLs warranting the need for continued OT services to maximize this pt's safety and IND with self care tasks and to facilitate this pt's ability to return to OF as able. Activity Tolerance: Patient limited by fatigue;Patient limited by endurance  Discharge Recommendations: Patient would benefit from continued therapy after discharge  Equipment Needed:  (CTA)      Plan   Plan  Times per Week: 3-4x/week; 1x/day as ace  Current Treatment Recommendations: ROM;Balance training;Functional mobility training; Endurance training;Pain management; Safety education & training;Patient/Caregiver education & training;Equipment evaluation, education, & procurement;Positioning;Self-Care / ADL; Home management training;Cognitive/Perceptual training;Coordination training;Sensory integraion     Restrictions   Restrictions/Precautions  Restrictions/Precautions: Contact Precautions  Position Activity Restriction  Other position/activity restrictions: Up with assist; LUE IV; Quesada    Subjective   Subjective  Subjective: Pt supine in bed upon arrival, declining all OOB activity d/t increased fatigue from recent procedures. Pt agreeable to supine PROM of digits of R hand. Pt stated he felt unwell, but did not elaborate when asked. Orientation  Overall Orientation Status: Within Functional Limits  Cognition  Overall Cognitive Status: Exceptions  Arousal/Alertness: Appropriate responses to stimuli  Following Commands: Follows multistep commands with repitition; Follows multistep commands with increased time  Attention Span: Attends with cues to redirect  Memory: Appears intact  Safety Judgement: Decreased awareness of need for safety  Problem Solving: Assistance required to generate solutions;Assistance required to implement solutions  Insights: Decreased awareness of deficits  Initiation: Requires cues for some  Sequencing: Requires cues for some  Cognition Comment: Pt demo's decreased safety awareness and insight, pt demo'd very tangential speech throughout treatment and needed frequent cues to re-direct attention. Objective       Bed Mobility Training  Bed Mobility Training:  (Pt declined all bed mobility and functional mobility tasks this date, stating he is \"unwell\" and too fatigued to participate d/t recent procedures. Pt stated he may be more able to sit at EOB/participate in OOB tasks later this evening.)     ADL  Feeding: Setup  Feeding Skilled Clinical Factors: Pt requesting coffee and water at this time, required set up assist to add creamer and fix lid. Once set up, pt able to utilize LUE to grasp standard cup and drink. Pt has limited/non-functional grasp with RUE. Additional Comments: Pt declining further participation in ADLs, stated he is too fatigued and \"unwell\" to participate at this time d/t recent procedures.  Pt's participation in ADLs and OOB activity limited secondary to decreased strength, decreased functional activity tolerance, decreased motivation, and B BKA with limited willingness to don prosthetic. OT Exercises  PROM Exercises: Pt presents with R digit flexion contractures on R hand. Facilitated PROM/therapeutic stretching of R digits and hand to promote ROM and functional use of digits required for increased safety and IND with self care tasks. Provided light joint mobilization of digits 2-5 MPs, PIPs, and DIPs to increase mobility. Engaged pt in MP, PIP, DIP flexion/extension and place and hold exercises, with instruction provided to pt on PROM within pain tolerance. Overall, pt tolerated PROM well with no c/o of pain. Safety Devices  Type of Devices: Bed alarm in place;Call light within reach; Left in bed;Nurse notified; Patient at risk for falls     Patient Education  Education Given To: Patient  Education Provided: Role of Therapy;Plan of Care;Precautions; Home Exercise Program  Education Provided Comments: Provided education to pt on the importance of participation in therapy, importance/benefits of OOB activity, PROM and joint mobilization techniques, importance of adhering to RUE splint schedule, safety in function, and call light use. Education Method: Verbal  Barriers to Learning: Cognition  Education Outcome: Continued education needed    AM-PAC: 15    Goals  Short Term Goals  Time Frame for Short term goals: By discharge, pt will  Short Term Goal 1: Demo ADL transfers to supervision with approp AD/DME to facilitate safe return home. Short Term Goal 2: Demo UB ADLs to MI and LB ADLs to Min A with use of AE and good safety. Short Term Goal 3: Tolerate sitting EOB x20 min for ADL completion with MI. Short Term Goal 4: Demo and verb good understanding of fall prevention techs, EC/WS techs, safe ADL strategies, positioning techs, PROM of R hand, skin integrity, pressure relief techs, and possible equip needs. Patient Goals   Patient goals :  To go home       Therapy Time   Individual Concurrent Group Co-treatment   Time In [phone]         Time Out 1420         Minutes [address]

## 2022-09-09 LAB
ABSOLUTE EOS #: 0.25 K/UL (ref 0–0.44)
ABSOLUTE IMMATURE GRANULOCYTE: 0.09 K/UL (ref 0–0.3)
ABSOLUTE LYMPH #: 1.73 K/UL (ref 1.1–3.7)
ABSOLUTE MONO #: 1.17 K/UL (ref 0.1–1.2)
ALBUMIN SERPL-MCNC: 2.8 G/DL (ref 3.5–5.2)
ALP BLD-CCNC: 115 U/L (ref 40–129)
ALT SERPL-CCNC: 9 U/L (ref 5–41)
ANION GAP SERPL CALCULATED.3IONS-SCNC: 7 MMOL/L (ref 9–17)
AST SERPL-CCNC: 6 U/L
BASOPHILS # BLD: 1 % (ref 0–2)
BASOPHILS ABSOLUTE: 0.08 K/UL (ref 0–0.2)
BILIRUB SERPL-MCNC: 0.1 MG/DL (ref 0.3–1.2)
BUN BLDV-MCNC: 17 MG/DL (ref 8–23)
BUN/CREAT BLD: 16 (ref 9–20)
CALCIUM SERPL-MCNC: 8.1 MG/DL (ref 8.6–10.4)
CHLORIDE BLD-SCNC: 111 MMOL/L (ref 98–107)
CO2: 24 MMOL/L (ref 20–31)
CREAT SERPL-MCNC: 1.09 MG/DL (ref 0.7–1.2)
EOSINOPHILS RELATIVE PERCENT: 2 % (ref 1–4)
GFR AFRICAN AMERICAN: >60 ML/MIN
GFR NON-AFRICAN AMERICAN: >60 ML/MIN
GFR SERPL CREATININE-BSD FRML MDRD: ABNORMAL ML/MIN/{1.73_M2}
GLUCOSE BLD-MCNC: 119 MG/DL (ref 75–110)
GLUCOSE BLD-MCNC: 136 MG/DL (ref 75–110)
GLUCOSE BLD-MCNC: 177 MG/DL (ref 75–110)
GLUCOSE BLD-MCNC: 240 MG/DL (ref 75–110)
GLUCOSE BLD-MCNC: 99 MG/DL (ref 70–99)
HCT VFR BLD CALC: 30.9 % (ref 40.7–50.3)
HEMOGLOBIN: 9.1 G/DL (ref 13–17)
IMMATURE GRANULOCYTES: 1 %
LYMPHOCYTES # BLD: 11 % (ref 24–43)
MCH RBC QN AUTO: 26.8 PG (ref 25.2–33.5)
MCHC RBC AUTO-ENTMCNC: 29.4 G/DL (ref 28.4–34.8)
MCV RBC AUTO: 90.9 FL (ref 82.6–102.9)
MONOCYTES # BLD: 7 % (ref 3–12)
NRBC AUTOMATED: 0 PER 100 WBC
PDW BLD-RTO: 15.9 % (ref 11.8–14.4)
PLATELET # BLD: 302 K/UL (ref 138–453)
PMV BLD AUTO: 9.6 FL (ref 8.1–13.5)
POTASSIUM SERPL-SCNC: 4.1 MMOL/L (ref 3.7–5.3)
RBC # BLD: 3.4 M/UL (ref 4.21–5.77)
RBC # BLD: ABNORMAL 10*6/UL
SEG NEUTROPHILS: 79 % (ref 36–65)
SEGMENTED NEUTROPHILS ABSOLUTE COUNT: 12.73 K/UL (ref 1.5–8.1)
SODIUM BLD-SCNC: 142 MMOL/L (ref 135–144)
TOTAL PROTEIN: 5.6 G/DL (ref 6.4–8.3)
WBC # BLD: 16.1 K/UL (ref 3.5–11.3)

## 2022-09-09 PROCEDURE — 2580000003 HC RX 258: Performed by: STUDENT IN AN ORGANIZED HEALTH CARE EDUCATION/TRAINING PROGRAM

## 2022-09-09 PROCEDURE — 6370000000 HC RX 637 (ALT 250 FOR IP): Performed by: NURSE PRACTITIONER

## 2022-09-09 PROCEDURE — 94761 N-INVAS EAR/PLS OXIMETRY MLT: CPT

## 2022-09-09 PROCEDURE — 94640 AIRWAY INHALATION TREATMENT: CPT

## 2022-09-09 PROCEDURE — 82947 ASSAY GLUCOSE BLOOD QUANT: CPT

## 2022-09-09 PROCEDURE — 6360000002 HC RX W HCPCS: Performed by: STUDENT IN AN ORGANIZED HEALTH CARE EDUCATION/TRAINING PROGRAM

## 2022-09-09 PROCEDURE — 36415 COLL VENOUS BLD VENIPUNCTURE: CPT

## 2022-09-09 PROCEDURE — 6370000000 HC RX 637 (ALT 250 FOR IP): Performed by: STUDENT IN AN ORGANIZED HEALTH CARE EDUCATION/TRAINING PROGRAM

## 2022-09-09 PROCEDURE — 1200000000 HC SEMI PRIVATE

## 2022-09-09 PROCEDURE — 80053 COMPREHEN METABOLIC PANEL: CPT

## 2022-09-09 PROCEDURE — 6360000002 HC RX W HCPCS: Performed by: NURSE PRACTITIONER

## 2022-09-09 PROCEDURE — 85025 COMPLETE CBC W/AUTO DIFF WBC: CPT

## 2022-09-09 PROCEDURE — 2580000003 HC RX 258: Performed by: NURSE PRACTITIONER

## 2022-09-09 PROCEDURE — 6370000000 HC RX 637 (ALT 250 FOR IP): Performed by: UROLOGY

## 2022-09-09 RX ADMIN — HYDROCODONE BITARTRATE AND ACETAMINOPHEN 1 TABLET: 5; 325 TABLET ORAL at 09:32

## 2022-09-09 RX ADMIN — MAGNESIUM CITRATE 296 ML: 1.75 LIQUID ORAL at 11:02

## 2022-09-09 RX ADMIN — INSULIN GLARGINE 30 UNITS: 100 INJECTION, SOLUTION SUBCUTANEOUS at 17:51

## 2022-09-09 RX ADMIN — APIXABAN 5 MG: 5 TABLET, FILM COATED ORAL at 07:25

## 2022-09-09 RX ADMIN — CEFTRIAXONE SODIUM 1000 MG: 1 INJECTION, POWDER, FOR SOLUTION INTRAMUSCULAR; INTRAVENOUS at 11:03

## 2022-09-09 RX ADMIN — FAMOTIDINE 20 MG: 20 TABLET, FILM COATED ORAL at 20:23

## 2022-09-09 RX ADMIN — MORPHINE SULFATE 4 MG: 4 INJECTION, SOLUTION INTRAMUSCULAR; INTRAVENOUS at 12:37

## 2022-09-09 RX ADMIN — METOPROLOL TARTRATE 25 MG: 25 TABLET, FILM COATED ORAL at 07:25

## 2022-09-09 RX ADMIN — HYDROCODONE BITARTRATE AND ACETAMINOPHEN 1 TABLET: 5; 325 TABLET ORAL at 19:23

## 2022-09-09 RX ADMIN — FAMOTIDINE 20 MG: 20 TABLET, FILM COATED ORAL at 07:25

## 2022-09-09 RX ADMIN — SODIUM CHLORIDE, PRESERVATIVE FREE 10 ML: 5 INJECTION INTRAVENOUS at 20:23

## 2022-09-09 RX ADMIN — FERROUS SULFATE TAB EC 325 MG (65 MG FE EQUIVALENT) 325 MG: 325 (65 FE) TABLET DELAYED RESPONSE at 07:25

## 2022-09-09 RX ADMIN — HYDROCODONE BITARTRATE AND ACETAMINOPHEN 1 TABLET: 5; 325 TABLET ORAL at 02:53

## 2022-09-09 RX ADMIN — BUDESONIDE AND FORMOTEROL FUMARATE DIHYDRATE 2 PUFF: 160; 4.5 AEROSOL RESPIRATORY (INHALATION) at 20:15

## 2022-09-09 RX ADMIN — ASPIRIN 81 MG: 81 TABLET, COATED ORAL at 07:25

## 2022-09-09 RX ADMIN — TAMSULOSIN HYDROCHLORIDE 0.4 MG: 0.4 CAPSULE ORAL at 07:25

## 2022-09-09 RX ADMIN — ATORVASTATIN CALCIUM 10 MG: 10 TABLET, FILM COATED ORAL at 20:23

## 2022-09-09 RX ADMIN — MORPHINE SULFATE 4 MG: 4 INJECTION, SOLUTION INTRAMUSCULAR; INTRAVENOUS at 09:42

## 2022-09-09 RX ADMIN — APIXABAN 5 MG: 5 TABLET, FILM COATED ORAL at 20:23

## 2022-09-09 RX ADMIN — MORPHINE SULFATE 4 MG: 4 INJECTION, SOLUTION INTRAMUSCULAR; INTRAVENOUS at 05:02

## 2022-09-09 RX ADMIN — MORPHINE SULFATE 4 MG: 4 INJECTION, SOLUTION INTRAMUSCULAR; INTRAVENOUS at 20:23

## 2022-09-09 RX ADMIN — METOPROLOL TARTRATE 25 MG: 25 TABLET, FILM COATED ORAL at 20:23

## 2022-09-09 RX ADMIN — MORPHINE SULFATE 4 MG: 4 INJECTION, SOLUTION INTRAMUSCULAR; INTRAVENOUS at 07:25

## 2022-09-09 ASSESSMENT — PAIN - FUNCTIONAL ASSESSMENT: PAIN_FUNCTIONAL_ASSESSMENT: PREVENTS OR INTERFERES SOME ACTIVE ACTIVITIES AND ADLS

## 2022-09-09 ASSESSMENT — PAIN DESCRIPTION - ORIENTATION
ORIENTATION: LEFT
ORIENTATION: LEFT;RIGHT
ORIENTATION: RIGHT;LEFT

## 2022-09-09 ASSESSMENT — PAIN SCALES - GENERAL
PAINLEVEL_OUTOF10: 8
PAINLEVEL_OUTOF10: 8
PAINLEVEL_OUTOF10: 7
PAINLEVEL_OUTOF10: 6
PAINLEVEL_OUTOF10: 8
PAINLEVEL_OUTOF10: 9
PAINLEVEL_OUTOF10: 7
PAINLEVEL_OUTOF10: 8

## 2022-09-09 ASSESSMENT — PAIN DESCRIPTION - LOCATION
LOCATION: BACK
LOCATION: FLANK
LOCATION: LEG;HAND
LOCATION: LEG;HAND
LOCATION: FLANK

## 2022-09-09 ASSESSMENT — PAIN DESCRIPTION - DESCRIPTORS
DESCRIPTORS: ACHING
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
## 2022-09-09 NOTE — PROGRESS NOTES
[facility]   Urology Progress Note            Subjective:  Follow-up gross hematuria lower urinary tract symptoms bilateral ureteral stents    Patient Vitals for the past 24 hrs:   BP Temp Temp src Pulse Resp SpO2 Weight   09/09/22 0432 (!) 125/52 97.7 °F (36.5 °C) Axillary 77 18 95 % --   09/09/22 0113 (!) 148/56 100.2 °F (37.9 °C) Oral 95 18 95 % 135 lb 1.6 oz (61.3 kg)   09/08/22 2042 -- -- -- -- -- 96 % --   09/08/22 1940 (!) 126/48 98.2 °F (36.8 °C) Oral 92 17 100 % --   09/08/22 1656 -- -- -- -- 16 -- --   09/08/22 1510 (!) 140/55 97.7 °F (36.5 °C) Oral 72 17 99 % --   09/08/22 1332 -- -- -- -- 14 -- --   09/08/22 1141 (!) 137/58 98.1 °F (36.7 °C) Oral 73 16 97 % --   09/08/22 1120 -- -- -- -- 14 -- --   09/08/22 0929 -- -- -- -- -- 96 % --   09/08/22 0736 (!) 148/55 98.1 °F (36.7 °C) Oral 73 17 96 % --       Intake/Output Summary (Last 24 hours) at 9/9/2022 0638  Last data filed at 9/9/2022 7016  Gross per 24 hour   Intake --   Output 3250 ml   Net -3250 ml       Recent Labs     09/06/22  2030 09/07/22  0608 09/08/22  0548   WBC 15.1* 10.7 10.0   HGB 12.8* 10.7* 9.4*   HCT 42.9 36.5* 32.5*   MCV 87.9 89.5 90.0    367 325     Recent Labs     09/07/22  0608 09/07/22  1321 09/08/22  0548    139 144   K 4.4 4.3 4.3   * 108* 114*   CO2 23 23 23   BUN 25* 26* 26*   CREATININE 2.04* 1.77* 1.38*       Recent Labs     09/07/22  0355 09/08/22  1305   COLORU Yellow Red*   PHUR 5.5 6.0   WBCUA None 5 TO 10   RBCUA 5 TO 10 TOO NUMEROUS TO COUNT   BACTERIA RARE*  --    SPECGRAV 1.015 1.025   LEUKOCYTESUR NEGATIVE TRACE*   UROBILINOGEN Normal Normal   BILIRUBINUR NEGATIVE NEGATIVE       Additional Lab/culture results:    Physical Exam: Patient alert not in acute distress CT imaging reviewed stents in good position decompression of hydronephrosis, most likely the stones and the hydronephrosis and stents contributed to the gross hematuria    Interval Imaging Findings:    Impression:    Patient Active Problem List   Diagnosis    Type 2 diabetes mellitus with diabetic polyneuropathy, with long-term current use of insulin (HCC)    Neuropathic pain, leg    Diabetic polyneuropathy (HCC)    Allergic rhinitis    Tobacco dependence    Edentulous    Dysphagia    Lung nodules    Esophageal cancer (HCC)    Fracture of humerus, left, closed    Closed fracture of humerus    DM type 2 with diabetic peripheral neuropathy (HCC)    COPD without exacerbation (HCC)    HLD (hyperlipidemia)    Gastroesophageal reflux disease without esophagitis    Chronic pain syndrome    Marijuana use    History of colon polyps    Functional diarrhea    Sepsis due to methicillin resistant Staphylococcus aureus (MRSA) (HCC)    History of esophageal cancer    Osteomyelitis, chronic, ankle or foot ([address]    PAD (peripheral artery disease) ([address]    Other pulmonary embolism without acute cor pulmonale (HCC)    Carotid stenosis, asymptomatic, bilateral    Lower limb amputation status    Fx humeral neck, right, closed, initial encounter    Transient hypotension    Constipation due to opioid therapy    Acute kidney injury superimposed on CKD ([address]    Complication of below knee amputation stump (HCC)    COPD exacerbation (HCC)    Hyponatremia    Pain, phantom limb (HCC)    S/P BKA (below knee amputation) bilateral (HCC)    Hyperglycemia    Moderate protein malnutrition ([address]    Essential hypertension    Uncontrolled type 2 diabetes mellitus with hyperglycemia ([address]    History of pulmonary embolism - 2017    Atelectasis    Uncontrolled type 2 diabetes mellitus with foot ulcer (HCC)    Azotemia    Anemia, normocytic normochromic    Drug-induced constipation    Osteomyelitis of metatarsals of left foot (HCC)    Diabetic foot infection ([address]    Noncompliance    Type 1 diabetes mellitus with diabetic foot infection ([address]    Acute osteomyelitis of left foot (HCC)    Cellulitis of left foot    Mild malnutrition ([address] Hypocalcemia    Hypokalemia    Moderate malnutrition (HCC)    History of below knee amputation, right (HCC)    Foot ulcer with fat layer exposed, left (HCC)    Chronic refractory osteomyelitis of left foot (HCC)    Sepsis (HCC)    Pyogenic inflammation of bone (HCC)    Cellulitis of left lower extremity    History of below knee amputation, left (HCC)    Leg ulcer, left, with fat layer exposed ([facility]    S/P amputation    Tobacco abuse    Pneumonia of right lower lobe due to infectious organism    Abdominal pain    Cannabis abuse    Parapneumonic effusion    Hiatus hernia -large    Metabolic encephalopathy    Altered mental status    Hyperkalemia    Alcoholic intoxication without complication ([address]    Acute encephalopathy    Depression    History of Clostridioides difficile colitis    Bilateral kidney stones    Ureteric stone       Plan: Bladder irrigation was discontinued, Quesada catheter out today prior to discharge    Follow-up visit at the office in 1 week   The patient was advised to call Dr. Siddharth Marinelli on Monday    Frieda Hitchcock MD  6:38 AM 9/9/2022

## 2022-09-09 NOTE — PROGRESS NOTES
DATE: 2022    NAME: Kylie Crowell  MRN: [id number]   : 1955    Patient not seen this date for Occupational Therapy due to:      [] Cancel by RN or physician due to:    [] Hemodialysis    [] Critical Lab Value Level     [] Blood transfusion in progress    [] Acute or unstable cardiovascular status   _MAP < 55 or more than >115  _HR < 40 or > 130    [] Acute or unstable pulmonary status   -FiO2 > 60%   _RR < 5 or >40    _O2 sats < 85%    [] Strict Bedrest    [] Off Unit for surgery or procedure    [] Off Unit for testing       [] Pending imaging to R/O fracture    [] Refusal by Patient : Pt. Declined a.m. treatment and would like to therapy to \"come back later\" . OT will check back if time permits. [] Other      [] OT being discontinued at this time. Patient independent. No further needs. [] OT being discontinued at this time as the patient has been transferred to hospice care. No further needs.       Disha Antunez PJ

## 2022-09-09 NOTE — PROGRESS NOTES
Physical Therapy  DATE: 2022    NAME: Neda Josue  MRN: [id number]   : 1955    Patient not seen this date for Physical Therapy due to:      [] Cancel by RN or physician due to:    [] Hemodialysis    [] Critical Lab Value Level     [] Blood transfusion in progress    [] Acute or unstable cardiovascular status   _MAP < 55 or more than >115  _HR < 40 or > 130    [] Acute or unstable pulmonary status   -FiO2 > 60%   _RR < 5 or >40    _O2 sats < 85%    [] Strict Bedrest    [] Off Unit for surgery or procedure    [] Off Unit for testing       [] Pending imaging to R/O fracture    [x] Refusal by Patient  States that he has wires coming out of me everywhere and I do not think they want me to move. RN states patient is appropriate however patient is adamant. [] Other      [] PT being discontinued at this time. Patient independent. No further needs. [] PT being discontinued at this time as the patient has been transferred to hospice care. No further needs.       Aries Heredia, PTA

## 2022-09-09 NOTE — CARE COORDINATION
DC Planning      LM with [facility] pt to dc home tonight. Spoke with pt, he was tentatively set up for 2030  today-pt insists he does not have anyone to get him set up at home tonight and the \"doctor said he can go home tomorrow\". Relays he will be have help tomorrow to get set up at home. His neighbor helps him get set up and will be available to help him tomorrow. He helps set up his meals and clothing/support. He prefers wc tomorrow he does have one prosthetic to use and can pivot in and out of wc. He relays he can go by wc and his entry does not have any stairs to enter. He can go per wc-called [facility] spoke with [facility] and arranged for WC  tomorrow about 1pm unless they have a cancellation sooner. Facesheet and med necessity form faxed to [facility] and placed in chart. Informed pt if he is stable for dc and does not go home tomorrow he will have to appeal dc. Updated Adeline Goldmann. He will need to follow up with Dr. Nelson Police in one week.
DC Planning    Pt was going to dc home today with Ambar Felton however had hematuria and will be starting CBI. Updated [place] [facility] -LOREE is signed. Home care agency will pull data electronically.
will resume services even though pt is extremely non compliant and declines care at times. LOREE initiated. Urology following. The Plan for Transition of Care is related to the following treatment goals: SN, PT/OT, HHA    The Patient was provided with a choice of provider and agrees   with the discharge plan. [x] Yes [] No    Freedom of choice list was provided with basic dialogue that supports the patient's individualized plan of care/goals, treatment preferences and shares the quality data associated with the providers.  [x] Yes [] No             Electronically signed by Vic Saldana RN on 9/7/22 at 2:10 PM EDT

## 2022-09-09 NOTE — PLAN OF CARE
Problem: Chronic Conditions and Co-morbidities  Goal: Patient's chronic conditions and co-morbidity symptoms are monitored and maintained or improved  9/9/2022 0810 by Spring Nunez RN  Outcome: Progressing  9/9/2022 0425 by Una Xiong RN  Outcome: Progressing     Problem: Discharge Planning  Goal: Discharge to home or other facility with appropriate resources  9/9/2022 0810 by Spring Nunez RN  Outcome: Progressing  9/9/2022 0425 by Una Xiong RN  Outcome: Progressing     Problem: Pain  Goal: Verbalizes/displays adequate comfort level or baseline comfort level  9/9/2022 0810 by Spring Nunez RN  Outcome: Progressing  9/9/2022 0425 by Una Xiong RN  Outcome: Progressing     Problem: Skin/Tissue Integrity  Goal: Absence of new skin breakdown  Description: 1. Monitor for areas of redness and/or skin breakdown  2. Assess vascular access sites hourly  3. Every 4-6 hours minimum:  Change oxygen saturation probe site  4. Every 4-6 hours:  If on nasal continuous positive airway pressure, respiratory therapy assess nares and determine need for appliance change or resting period.   9/9/2022 0810 by Spring Nunez RN  Outcome: Progressing  9/9/2022 0425 by Una Xiong RN  Outcome: Progressing     Problem: ABCDS Injury Assessment  Goal: Absence of physical injury  9/9/2022 0810 by Spring Nunez RN  Outcome: Progressing  9/9/2022 0425 by Una Xiong RN  Outcome: Progressing     Problem: Safety - Adult  Goal: Free from fall injury  9/9/2022 0810 by Spring Nunez RN  Outcome: Progressing  9/9/2022 0425 by Una Xiong RN  Outcome: Progressing

## 2022-09-10 VITALS
SYSTOLIC BLOOD PRESSURE: 133 MMHG | BODY MASS INDEX: 17.61 KG/M2 | WEIGHT: 130 LBS | HEART RATE: 69 BPM | OXYGEN SATURATION: 98 % | DIASTOLIC BLOOD PRESSURE: 56 MMHG | HEIGHT: 72 IN | RESPIRATION RATE: 16 BRPM | TEMPERATURE: 98.2 F

## 2022-09-10 LAB
ANION GAP SERPL CALCULATED.3IONS-SCNC: 5 MMOL/L (ref 9–17)
BUN BLDV-MCNC: 23 MG/DL (ref 8–23)
BUN/CREAT BLD: 21 (ref 9–20)
CALCIUM SERPL-MCNC: 8.7 MG/DL (ref 8.6–10.4)
CHLORIDE BLD-SCNC: 105 MMOL/L (ref 98–107)
CO2: 29 MMOL/L (ref 20–31)
CREAT SERPL-MCNC: 1.1 MG/DL (ref 0.7–1.2)
GFR AFRICAN AMERICAN: >60 ML/MIN
GFR NON-AFRICAN AMERICAN: >60 ML/MIN
GFR SERPL CREATININE-BSD FRML MDRD: ABNORMAL ML/MIN/{1.73_M2}
GLUCOSE BLD-MCNC: 214 MG/DL (ref 75–110)
GLUCOSE BLD-MCNC: 238 MG/DL (ref 70–99)
GLUCOSE BLD-MCNC: 255 MG/DL (ref 75–110)
POTASSIUM SERPL-SCNC: 5 MMOL/L (ref 3.7–5.3)
SODIUM BLD-SCNC: 139 MMOL/L (ref 135–144)

## 2022-09-10 PROCEDURE — 2580000003 HC RX 258: Performed by: NURSE PRACTITIONER

## 2022-09-10 PROCEDURE — 6370000000 HC RX 637 (ALT 250 FOR IP): Performed by: UROLOGY

## 2022-09-10 PROCEDURE — 6370000000 HC RX 637 (ALT 250 FOR IP): Performed by: NURSE PRACTITIONER

## 2022-09-10 PROCEDURE — 36415 COLL VENOUS BLD VENIPUNCTURE: CPT

## 2022-09-10 PROCEDURE — 6360000002 HC RX W HCPCS: Performed by: NURSE PRACTITIONER

## 2022-09-10 PROCEDURE — 80048 BASIC METABOLIC PNL TOTAL CA: CPT

## 2022-09-10 PROCEDURE — 6370000000 HC RX 637 (ALT 250 FOR IP): Performed by: STUDENT IN AN ORGANIZED HEALTH CARE EDUCATION/TRAINING PROGRAM

## 2022-09-10 PROCEDURE — 82947 ASSAY GLUCOSE BLOOD QUANT: CPT

## 2022-09-10 RX ADMIN — TAMSULOSIN HYDROCHLORIDE 0.4 MG: 0.4 CAPSULE ORAL at 07:44

## 2022-09-10 RX ADMIN — FERROUS SULFATE TAB EC 325 MG (65 MG FE EQUIVALENT) 325 MG: 325 (65 FE) TABLET DELAYED RESPONSE at 07:44

## 2022-09-10 RX ADMIN — HYDROCODONE BITARTRATE AND ACETAMINOPHEN 1 TABLET: 5; 325 TABLET ORAL at 12:39

## 2022-09-10 RX ADMIN — ASPIRIN 81 MG: 81 TABLET, COATED ORAL at 07:44

## 2022-09-10 RX ADMIN — FAMOTIDINE 20 MG: 20 TABLET, FILM COATED ORAL at 07:45

## 2022-09-10 RX ADMIN — INSULIN LISPRO 1 UNITS: 100 INJECTION, SOLUTION INTRAVENOUS; SUBCUTANEOUS at 13:52

## 2022-09-10 RX ADMIN — NALOXEGOL OXALATE 12.5 MG: 12.5 TABLET, FILM COATED ORAL at 06:00

## 2022-09-10 RX ADMIN — METOPROLOL TARTRATE 25 MG: 25 TABLET, FILM COATED ORAL at 07:46

## 2022-09-10 RX ADMIN — HYDROCODONE BITARTRATE AND ACETAMINOPHEN 1 TABLET: 5; 325 TABLET ORAL at 06:00

## 2022-09-10 RX ADMIN — MORPHINE SULFATE 4 MG: 4 INJECTION, SOLUTION INTRAMUSCULAR; INTRAVENOUS at 07:47

## 2022-09-10 RX ADMIN — SODIUM CHLORIDE, PRESERVATIVE FREE 10 ML: 5 INJECTION INTRAVENOUS at 07:45

## 2022-09-10 RX ADMIN — INSULIN LISPRO 1 UNITS: 100 INJECTION, SOLUTION INTRAVENOUS; SUBCUTANEOUS at 07:48

## 2022-09-10 ASSESSMENT — PAIN DESCRIPTION - LOCATION
LOCATION: FLANK
LOCATION: FLANK

## 2022-09-10 ASSESSMENT — PAIN - FUNCTIONAL ASSESSMENT: PAIN_FUNCTIONAL_ASSESSMENT: PREVENTS OR INTERFERES SOME ACTIVE ACTIVITIES AND ADLS

## 2022-09-10 ASSESSMENT — PAIN DESCRIPTION - PAIN TYPE: TYPE: ACUTE PAIN;SURGICAL PAIN

## 2022-09-10 ASSESSMENT — PAIN DESCRIPTION - DESCRIPTORS: DESCRIPTORS: ACHING;DISCOMFORT

## 2022-09-10 ASSESSMENT — PAIN DESCRIPTION - FREQUENCY: FREQUENCY: INTERMITTENT

## 2022-09-10 ASSESSMENT — PAIN SCALES - GENERAL
PAINLEVEL_OUTOF10: 8
PAINLEVEL_OUTOF10: 9
PAINLEVEL_OUTOF10: 9

## 2022-09-10 ASSESSMENT — PAIN DESCRIPTION - ORIENTATION: ORIENTATION: LEFT

## 2022-09-10 ASSESSMENT — PAIN DESCRIPTION - ONSET: ONSET: ON-GOING

## 2022-09-10 NOTE — PLAN OF CARE
Problem: Chronic Conditions and Co-morbidities  Goal: Patient's chronic conditions and co-morbidity symptoms are monitored and maintained or improved  9/10/2022 1410 by Shanae Raymond RN  Outcome: Progressing  Flowsheets (Taken 9/10/2022 0750)  Care Plan - Patient's Chronic Conditions and Co-Morbidity Symptoms are Monitored and Maintained or Improved:   Monitor and assess patient's chronic conditions and comorbid symptoms for stability, deterioration, or improvement   Update acute care plan with appropriate goals if chronic or comorbid symptoms are exacerbated and prevent overall improvement and discharge  9/10/2022 0518 by Uriel Castro RN  Outcome: Progressing     Problem: Discharge Planning  Goal: Discharge to home or other facility with appropriate resources  9/10/2022 1410 by Shanae Raymond RN  Outcome: Progressing  Flowsheets (Taken 9/10/2022 0750)  Discharge to home or other facility with appropriate resources:   Identify barriers to discharge with patient and caregiver   Arrange for needed discharge resources and transportation as appropriate   Refer to discharge planning if patient needs post-hospital services based on physician order or complex needs related to functional status, cognitive ability or social support system  9/10/2022 0518 by Uriel Castro RN  Outcome: Progressing     Problem: Pain  Goal: Verbalizes/displays adequate comfort level or baseline comfort level  9/10/2022 1410 by Shanae Raymond RN  Outcome: Progressing  9/10/2022 0518 by Uriel Castro RN  Outcome: Progressing     Problem: Skin/Tissue Integrity  Goal: Absence of new skin breakdown  Description: 1. Monitor for areas of redness and/or skin breakdown  2. Assess vascular access sites hourly  3. Every 4-6 hours minimum:  Change oxygen saturation probe site  4. Every 4-6 hours:  If on nasal continuous positive airway pressure, respiratory therapy assess nares and determine need for appliance change or resting period.   9/10/2022 1410 by Ronnie Arnold RN  Outcome: Progressing  9/10/2022 0518 by Mariah Perez RN  Outcome: Progressing     Problem: ABCDS Injury Assessment  Goal: Absence of physical injury  9/10/2022 1410 by Ronnie Arnold RN  Outcome: Progressing  9/10/2022 0518 by Mariah Perez RN  Outcome: Progressing     Problem: Safety - Adult  Goal: Free from fall injury  9/10/2022 1410 by Ronnie Arnold RN  Outcome: Progressing  9/10/2022 0518 by Mariah Perez RN  Outcome: Progressing

## 2022-09-10 NOTE — PROGRESS NOTES
The patient was discharged; transferred per [address] life flight in wheelchair. The patient asked for morphine at discharge; Esperanza Loving given recently as patient is discharged. The patient began threatening writer, using profanity and stated \"if I was in [place] I would've killed you myself you bitch\".

## 2022-09-10 NOTE — DISCHARGE SUMMARY
[facility]  Office: [address], DO, Hu Kent, DO, [address], DO, Enio Her Blood, DO, Danelle Cordero MD, Patricio Mccoy MD, Samy Gonzalez MD, Mami Lazaro MD,  Karin Guadarrama MD, Sridevi Mosley MD, Ankit Izaguirre, DO, Hortensia Zimmer MD,  Andreas Green, DO, Monique Lopez MD, Cornelia Yi MD, Panchito Chauhan, DO, Patrice Viera MD, Della Gutierrez MD, Marquise Dejesus MD, Tabitha Barron MD, Fede Fagan MD, Adam Uribe MD, Akilah Choi, DO, Bianca Conde MD, Khai Duggan MD, Raissa Berrios, CNP,  Jyoti Serna, CNP, Bora Cunningham, [facility], Leah Stone, CNP, Iraida Oneill PA-C, Denver Parks, DNP, Mya Salgado, CNP, Royce Beasley, CNP, Nancy Rosenbaum, CNP, Shai Lweis, CNP, Abi Cooley, CNP, Roberto Lassiter, CNS, Delvis Montague, [facility], [place]), Deyvi Gonzales, CNP, Maico Torre, [facility], Ezequiel Dietz, [facility]    Discharge Summary     Patient ID: Lucille Marie  :  1955   MRN: [id number]     ACCOUNT:  [de-identified]   Patient's PCP: MARCELO Segura CNP  Admit Date: 2022   Discharge Date: 9/10/2022     Length of Stay: 3  Code Status:  Full Code  Admitting Physician: No admitting provider for patient encounter. Discharge Physician: Della Gutierrez MD     Active Discharge Diagnoses:     Hospital Problem Lists:  Principal Problem:    Bilateral kidney stones  Active Problems:    History of Clostridioides difficile colitis    Ureteric stone    Type 2 diabetes mellitus with diabetic polyneuropathy, with long-term current use of insulin ([facility] [address]    Acute kidney injury superimposed on CKD [facility])    Essential hypertension    History of pulmonary embolism - 2017  Resolved Problems:    * No resolved hospital problems.  *      Admission Condition:  fair     Discharged Condition: good    Hospital Stay:     Hospital Course:  Lucille Marie is a 61 y.o. male who was admitted for the management of  Bilateral kidney stones , presented to ER with Flank Pain (Left, no dysuria) and Nausea (With emesis over last 6 months)      61 M admitted with flank pain, dysuria and hematuria    Evaluated by urology and deemed appropriate for emergent cystogram with bilateral stent placement    Right double-J ureteral stent in place with minimal residual   hydronephrosis. Repositioning of 1 cm obstructing stone now within a lower   pole calyx. 2.  Left double-J ureteral stent in place with similar position of previously   seen 0.7 cm proximal left ureteral stone. Resolution of left hydronephrosis. He then had LUMA following the procedure preventing his DC  Then developed hematuria and required CBI  Hematuria improved, but still remains. This is a known side effect of procedure and he was counseled on this    Home care set up for patient    Given instructions to follow up with his PCP and urologist on dc  As well as rx for flomax    He should hold his eliquis until bleeding stops and resume once no more residual bleeding    Significant therapeutic interventions: emergent cystogram with bilateral stent placement    Significant Diagnostic Studies:   Labs / Micro:  BMP:    Lab Results   Component Value Date/Time    GLUCOSE 238 09/10/2022 06:40 AM    GLUCOSE 129 05/02/2012 01:42 PM     09/10/2022 06:40 AM    K 5.0 09/10/2022 06:40 AM     09/10/2022 06:40 AM    CO2 29 09/10/2022 06:40 AM    ANIONGAP 5 09/10/2022 06:40 AM    BUN 23 09/10/2022 06:40 AM    CREATININE 1.10 09/10/2022 06:40 AM    BUNCRER 21 09/10/2022 06:40 AM    CALCIUM 8.7 09/10/2022 06:40 AM    LABGLOM >60 09/10/2022 06:40 AM    GFRAA >60 09/10/2022 06:40 AM    GFR      09/10/2022 06:40 AM        Radiology:  CT ABDOMEN PELVIS WO CONTRAST Additional Contrast? Radiologist Recommendation    Result Date: 9/8/2022  1. Right double-J ureteral stent in place with minimal residual hydronephrosis.   Repositioning of 1 cm obstructing stone now within a lower pole calyx. 2.  Left double-J ureteral stent in place with similar position of previously seen 0.7 cm proximal left ureteral stone. Resolution of left hydronephrosis. 3.  Interval improvement of bilateral perinephric stranding. 4.  Bilateral nephrolithiasis again demonstrated. 5.  Patchy peripheral airspace disease appears more prominent in the interval, which may represent multifocal inflammatory process (including aspiration) or infection. 6.  Moderate size hiatal hernia. CT ABDOMEN PELVIS W IV CONTRAST Additional Contrast? None    Result Date: 9/7/2022  1. Multiple small calculi remain in the right and left kidney. There is mild to moderate hydronephrosis at the right and left kidney due to calculi in the proximal right and left ureter. The distal ureters are nondilated. Increased perinephric stranding and edema likely due to the current urinary obstructions. 2.  A 15 mm nodule in the right adrenal gland has a higher density than 10 Hounsfield units but has been there since at least 07/09/2018 indicating. This stability suggests a lipid poor adenoma. 3.  No bowel obstruction, appendicitis, or pneumoperitoneum. 4.  Moderate hiatal hernia in the lower chest and extending above the level of the current imaging (see the previous chest CT). Does have thickening along the wall suggesting gastritis. XR CHEST PORTABLE    Result Date: 9/6/2022  No acute process. Consultations:    Consults:     Final Specialist Recommendations/Findings:   IP CONSULT TO UROLOGY  IP CONSULT TO INTERNAL MEDICINE  IP CONSULT TO UROLOGY      The patient was seen and examined on day of discharge and this discharge summary is in conjunction with any daily progress note from day of discharge.     Discharge plan:     Disposition: Home    Physician Follow Up:     [address] [phone]  Follow up  [address] [facility], [address] [place] [address] Rd  [facility], [address]  [address]    Schedule an appointment as soon as possible for a visit in 1 week(s)         Requiring Further Evaluation/Follow Up POST HOSPITALIZATION/Incidental Findings: see urology    Diet: cardiac diet    Activity: As tolerated    Instructions to Patient: f/u with urology and pcp - work with home care    Discharge Medications:      Medication List        START taking these medications      amoxicillin-clavulanate 500-125 MG per tablet  Commonly known as: AUGMENTIN  Take 1 tablet by mouth every 12 hours for 18 doses            CHANGE how you take these medications      hydrOXYzine HCl 25 MG tablet  Commonly known as: ATARAX  Take 1 tablet by mouth daily  What changed:   when to take this  reasons to take this     * metoprolol tartrate 25 MG tablet  Commonly known as: LOPRESSOR  Take 1 tablet by mouth 2 times daily Hold for heart rate less than 60 or systolic blood pressure less than 100  What changed: Another medication with the same name was added. Make sure you understand how and when to take each. * metoprolol tartrate 25 MG tablet  Commonly known as: LOPRESSOR  Take 1 tablet by mouth 2 times daily  What changed: You were already taking a medication with the same name, and this prescription was added. Make sure you understand how and when to take each. * This list has 2 medication(s) that are the same as other medications prescribed for you. Read the directions carefully, and ask your doctor or other care provider to review them with you.                 CONTINUE taking these medications      albuterol sulfate  (90 Base) MCG/ACT inhaler  Commonly known as: Ventolin HFA  Inhale 2 puffs into the lungs every 6 hours as needed for Wheezing     apixaban 5 MG Tabs tablet  Commonly known as: Eliquis  Take 1 tablet by mouth 2 times daily     aspirin EC 81 MG EC tablet     atorvastatin 10 MG tablet  Commonly known as: LIPITOR  Take 1 tablet by mouth nightly

## 2022-09-13 ENCOUNTER — TELEPHONE (OUTPATIENT)
Dept: FAMILY MEDICINE CLINIC | Age: 67
End: 2022-09-13

## 2022-09-13 NOTE — TELEPHONE ENCOUNTER
Megan Mendoza with [facility] called and states when patient was in the hospital they were doing a medihoney treatment for patients wound on the back of right knee. She wanted to know if we wanted to continue this and if so to have us fax the order to [phone].

## 2022-09-13 NOTE — TELEPHONE ENCOUNTER
Isabella Bolanos called from [address] caring and stated that she is getting pt set up with home care since he was just discharged from the hospital again. Pt had kidney stones. She is getting pt set up with Pt/OT, health aid, nursing and social work.

## 2022-09-23 ENCOUNTER — APPOINTMENT (OUTPATIENT)
Dept: CT IMAGING | Age: 67
End: 2022-09-23
Payer: MEDICARE

## 2022-09-23 ENCOUNTER — HOSPITAL ENCOUNTER (EMERGENCY)
Age: 67
Discharge: HOME | End: 2022-09-24
Attending: EMERGENCY MEDICINE
Payer: MEDICARE

## 2022-09-23 VITALS
HEIGHT: 72 IN | DIASTOLIC BLOOD PRESSURE: 75 MMHG | OXYGEN SATURATION: 98 % | TEMPERATURE: 98.9 F | BODY MASS INDEX: 17.61 KG/M2 | SYSTOLIC BLOOD PRESSURE: 136 MMHG | HEART RATE: 82 BPM | WEIGHT: 130 LBS | RESPIRATION RATE: 16 BRPM

## 2022-09-23 DIAGNOSIS — N20.0 KIDNEY STONE: ICD-10-CM

## 2022-09-23 DIAGNOSIS — R10.9 FLANK PAIN: Primary | ICD-10-CM

## 2022-09-23 LAB
ABSOLUTE EOS #: 0.42 K/UL (ref 0–0.44)
ABSOLUTE IMMATURE GRANULOCYTE: 0.09 K/UL (ref 0–0.3)
ABSOLUTE LYMPH #: 2.13 K/UL (ref 1.1–3.7)
ABSOLUTE MONO #: 0.88 K/UL (ref 0.1–1.2)
ANION GAP SERPL CALCULATED.3IONS-SCNC: 11 MMOL/L (ref 9–17)
BACTERIA: ABNORMAL
BASOPHILS # BLD: 1 % (ref 0–2)
BASOPHILS ABSOLUTE: 0.13 K/UL (ref 0–0.2)
BILIRUBIN URINE: NEGATIVE
BUN BLDV-MCNC: 21 MG/DL (ref 8–23)
BUN/CREAT BLD: 15 (ref 9–20)
CALCIUM SERPL-MCNC: 9.2 MG/DL (ref 8.6–10.4)
CHLORIDE BLD-SCNC: 101 MMOL/L (ref 98–107)
CO2: 23 MMOL/L (ref 20–31)
COLOR: ABNORMAL
CREAT SERPL-MCNC: 1.39 MG/DL (ref 0.7–1.2)
EOSINOPHILS RELATIVE PERCENT: 3 % (ref 1–4)
EPITHELIAL CELLS UA: ABNORMAL /HPF (ref 0–5)
GFR AFRICAN AMERICAN: >60 ML/MIN
GFR NON-AFRICAN AMERICAN: 51 ML/MIN
GFR SERPL CREATININE-BSD FRML MDRD: ABNORMAL ML/MIN/{1.73_M2}
GLUCOSE BLD-MCNC: 355 MG/DL (ref 70–99)
GLUCOSE URINE: ABNORMAL
HCT VFR BLD CALC: 35.5 % (ref 40.7–50.3)
HEMOGLOBIN: 10.9 G/DL (ref 13–17)
IMMATURE GRANULOCYTES: 1 %
KETONES, URINE: ABNORMAL
LACTIC ACID: 1.1 MMOL/L (ref 0.5–2.2)
LEUKOCYTE ESTERASE, URINE: ABNORMAL
LYMPHOCYTES # BLD: 14 % (ref 24–43)
MCH RBC QN AUTO: 26.3 PG (ref 25.2–33.5)
MCHC RBC AUTO-ENTMCNC: 30.7 G/DL (ref 28.4–34.8)
MCV RBC AUTO: 85.5 FL (ref 82.6–102.9)
MONOCYTES # BLD: 6 % (ref 3–12)
NITRITE, URINE: NEGATIVE
NRBC AUTOMATED: 0 PER 100 WBC
PDW BLD-RTO: 14.8 % (ref 11.8–14.4)
PH UA: 6 (ref 5–8)
PLATELET # BLD: 570 K/UL (ref 138–453)
PMV BLD AUTO: 9.3 FL (ref 8.1–13.5)
POTASSIUM SERPL-SCNC: 5 MMOL/L (ref 3.7–5.3)
PROTEIN UA: ABNORMAL
RBC # BLD: 4.15 M/UL (ref 4.21–5.77)
RBC # BLD: ABNORMAL 10*6/UL
RBC UA: ABNORMAL /HPF (ref 0–2)
SEG NEUTROPHILS: 75 % (ref 36–65)
SEGMENTED NEUTROPHILS ABSOLUTE COUNT: 11.79 K/UL (ref 1.5–8.1)
SODIUM BLD-SCNC: 135 MMOL/L (ref 135–144)
SPECIFIC GRAVITY UA: 1.02 (ref 1–1.03)
TURBIDITY: ABNORMAL
URINE HGB: ABNORMAL
UROBILINOGEN, URINE: NORMAL
WBC # BLD: 15.4 K/UL (ref 3.5–11.3)
WBC UA: ABNORMAL /HPF (ref 0–5)

## 2022-09-23 PROCEDURE — 80048 BASIC METABOLIC PNL TOTAL CA: CPT

## 2022-09-23 PROCEDURE — 85025 COMPLETE CBC W/AUTO DIFF WBC: CPT

## 2022-09-23 PROCEDURE — 96375 TX/PRO/DX INJ NEW DRUG ADDON: CPT

## 2022-09-23 PROCEDURE — 81001 URINALYSIS AUTO W/SCOPE: CPT

## 2022-09-23 PROCEDURE — 96374 THER/PROPH/DIAG INJ IV PUSH: CPT

## 2022-09-23 PROCEDURE — 87040 BLOOD CULTURE FOR BACTERIA: CPT

## 2022-09-23 PROCEDURE — 2580000003 HC RX 258: Performed by: EMERGENCY MEDICINE

## 2022-09-23 PROCEDURE — 99284 EMERGENCY DEPT VISIT MOD MDM: CPT

## 2022-09-23 PROCEDURE — 83605 ASSAY OF LACTIC ACID: CPT

## 2022-09-23 PROCEDURE — 74176 CT ABD & PELVIS W/O CONTRAST: CPT

## 2022-09-23 PROCEDURE — 6360000002 HC RX W HCPCS: Performed by: EMERGENCY MEDICINE

## 2022-09-23 RX ORDER — OXYBUTYNIN CHLORIDE 10 MG/1
10 TABLET, EXTENDED RELEASE ORAL DAILY
Qty: 15 TABLET | Refills: 3 | Status: SHIPPED | OUTPATIENT
Start: 2022-09-23

## 2022-09-23 RX ORDER — MORPHINE SULFATE 4 MG/ML
4 INJECTION, SOLUTION INTRAMUSCULAR; INTRAVENOUS ONCE
Status: COMPLETED | OUTPATIENT
Start: 2022-09-23 | End: 2022-09-23

## 2022-09-23 RX ORDER — MORPHINE SULFATE 2 MG/ML
2 INJECTION, SOLUTION INTRAMUSCULAR; INTRAVENOUS ONCE
Status: COMPLETED | OUTPATIENT
Start: 2022-09-23 | End: 2022-09-23

## 2022-09-23 RX ADMIN — MORPHINE SULFATE 2 MG: 2 INJECTION, SOLUTION INTRAMUSCULAR; INTRAVENOUS at 23:17

## 2022-09-23 RX ADMIN — CEFTRIAXONE SODIUM 1000 MG: 1 INJECTION, POWDER, FOR SOLUTION INTRAMUSCULAR; INTRAVENOUS at 23:16

## 2022-09-23 RX ADMIN — MORPHINE SULFATE 4 MG: 4 INJECTION, SOLUTION INTRAMUSCULAR; INTRAVENOUS at 21:30

## 2022-09-23 ASSESSMENT — ENCOUNTER SYMPTOMS
EYES NEGATIVE: 1
CHEST TIGHTNESS: 0
CONSTIPATION: 0
VOMITING: 0
DIARRHEA: 0
APNEA: 0
ABDOMINAL DISTENTION: 0
ABDOMINAL PAIN: 1
COLOR CHANGE: 0
SHORTNESS OF BREATH: 0
SINUS PAIN: 0

## 2022-09-23 ASSESSMENT — PAIN SCALES - GENERAL: PAINLEVEL_OUTOF10: 10

## 2022-09-23 ASSESSMENT — PAIN - FUNCTIONAL ASSESSMENT: PAIN_FUNCTIONAL_ASSESSMENT: 0-10
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
## 2022-09-24 NOTE — DISCHARGE INSTRUCTIONS
I would call the urology office next week if possible to confirm when the stents are going to be removed. In the meantime make sure you are taking the Flomax that was prescribed. Oxybutynin can be used to help with pain control.

## 2022-09-29 LAB
CULTURE: NORMAL
Lab: NORMAL
SPECIMEN DESCRIPTION: NORMAL

## 2022-10-07 ENCOUNTER — HOSPITAL ENCOUNTER (OUTPATIENT)
Age: 67
Setting detail: OUTPATIENT SURGERY
Discharge: HOME | End: 2022-10-07
Attending: UROLOGY | Admitting: UROLOGY
Payer: MEDICARE

## 2022-10-07 ENCOUNTER — ANESTHESIA EVENT (OUTPATIENT)
Dept: OPERATING ROOM | Age: 67
End: 2022-10-07
Payer: MEDICARE

## 2022-10-07 ENCOUNTER — APPOINTMENT (OUTPATIENT)
Dept: GENERAL RADIOLOGY | Age: 67
End: 2022-10-07
Attending: UROLOGY
Payer: MEDICARE

## 2022-10-07 ENCOUNTER — ANESTHESIA (OUTPATIENT)
Dept: OPERATING ROOM | Age: 67
End: 2022-10-07
Payer: MEDICARE

## 2022-10-07 VITALS
WEIGHT: 135 LBS | TEMPERATURE: 97 F | SYSTOLIC BLOOD PRESSURE: 113 MMHG | HEART RATE: 75 BPM | OXYGEN SATURATION: 98 % | BODY MASS INDEX: 18.31 KG/M2 | DIASTOLIC BLOOD PRESSURE: 96 MMHG | RESPIRATION RATE: 23 BRPM

## 2022-10-07 DIAGNOSIS — N20.0 BILATERAL KIDNEY STONES: ICD-10-CM

## 2022-10-07 DIAGNOSIS — N20.1 URETERIC STONE: Primary | ICD-10-CM

## 2022-10-07 LAB
BACTERIA: ABNORMAL
BILIRUBIN URINE: NEGATIVE
COLOR: YELLOW
EPITHELIAL CELLS UA: ABNORMAL /HPF (ref 0–5)
GLUCOSE BLD-MCNC: 187 MG/DL (ref 75–110)
GLUCOSE BLD-MCNC: 222 MG/DL (ref 75–110)
GLUCOSE URINE: ABNORMAL
KETONES, URINE: NEGATIVE
LEUKOCYTE ESTERASE, URINE: ABNORMAL
NITRITE, URINE: NEGATIVE
PH UA: 6 (ref 5–8)
PROTEIN UA: ABNORMAL
RBC UA: ABNORMAL /HPF (ref 0–2)
SPECIFIC GRAVITY UA: 1.02 (ref 1–1.03)
TURBIDITY: ABNORMAL
URINE HGB: ABNORMAL
UROBILINOGEN, URINE: NORMAL
WBC UA: ABNORMAL /HPF (ref 0–5)

## 2022-10-07 PROCEDURE — 3600000002 HC SURGERY LEVEL 2 BASE: Performed by: UROLOGY

## 2022-10-07 PROCEDURE — 7100000010 HC PHASE II RECOVERY - FIRST 15 MIN: Performed by: UROLOGY

## 2022-10-07 PROCEDURE — 6360000002 HC RX W HCPCS: Performed by: NURSE ANESTHETIST, CERTIFIED REGISTERED

## 2022-10-07 PROCEDURE — 2709999900 HC NON-CHARGEABLE SUPPLY: Performed by: UROLOGY

## 2022-10-07 PROCEDURE — 7100000000 HC PACU RECOVERY - FIRST 15 MIN: Performed by: UROLOGY

## 2022-10-07 PROCEDURE — 2580000003 HC RX 258: Performed by: ANESTHESIOLOGY

## 2022-10-07 PROCEDURE — 6360000002 HC RX W HCPCS: Performed by: ANESTHESIOLOGY

## 2022-10-07 PROCEDURE — 3209999900 FLUORO FOR SURGICAL PROCEDURES

## 2022-10-07 PROCEDURE — 2500000003 HC RX 250 WO HCPCS: Performed by: NURSE ANESTHETIST, CERTIFIED REGISTERED

## 2022-10-07 PROCEDURE — 7100000011 HC PHASE II RECOVERY - ADDTL 15 MIN: Performed by: UROLOGY

## 2022-10-07 PROCEDURE — 2720000010 HC SURG SUPPLY STERILE: Performed by: UROLOGY

## 2022-10-07 PROCEDURE — 3700000001 HC ADD 15 MINUTES (ANESTHESIA): Performed by: UROLOGY

## 2022-10-07 PROCEDURE — 81001 URINALYSIS AUTO W/SCOPE: CPT

## 2022-10-07 PROCEDURE — C1769 GUIDE WIRE: HCPCS | Performed by: UROLOGY

## 2022-10-07 PROCEDURE — 6370000000 HC RX 637 (ALT 250 FOR IP): Performed by: UROLOGY

## 2022-10-07 PROCEDURE — 87086 URINE CULTURE/COLONY COUNT: CPT

## 2022-10-07 PROCEDURE — 6370000000 HC RX 637 (ALT 250 FOR IP): Performed by: ANESTHESIOLOGY

## 2022-10-07 PROCEDURE — 74018 RADEX ABDOMEN 1 VIEW: CPT

## 2022-10-07 PROCEDURE — 82947 ASSAY GLUCOSE BLOOD QUANT: CPT

## 2022-10-07 PROCEDURE — C2617 STENT, NON-COR, TEM W/O DEL: HCPCS | Performed by: UROLOGY

## 2022-10-07 PROCEDURE — C1758 CATHETER, URETERAL: HCPCS | Performed by: UROLOGY

## 2022-10-07 PROCEDURE — 7100000001 HC PACU RECOVERY - ADDTL 15 MIN: Performed by: UROLOGY

## 2022-10-07 PROCEDURE — 3600000012 HC SURGERY LEVEL 2 ADDTL 15MIN: Performed by: UROLOGY

## 2022-10-07 PROCEDURE — 3700000000 HC ANESTHESIA ATTENDED CARE: Performed by: UROLOGY

## 2022-10-07 DEVICE — URETERAL STENT
Type: IMPLANTABLE DEVICE | Site: URETHRA | Status: FUNCTIONAL
Brand: POLARIS™ ULTRA

## 2022-10-07 RX ORDER — OXYCODONE HYDROCHLORIDE 5 MG/1
2.5 TABLET ORAL PRN
Status: COMPLETED | OUTPATIENT
Start: 2022-10-07 | End: 2022-10-07

## 2022-10-07 RX ORDER — PHENYLEPHRINE HCL IN 0.9% NACL 1 MG/10 ML
VIAL (ML) INTRAVENOUS PRN
Status: DISCONTINUED | OUTPATIENT
Start: 2022-10-07 | End: 2022-10-07 | Stop reason: SDUPTHER

## 2022-10-07 RX ORDER — CEFAZOLIN SODIUM 1 G/3ML
INJECTION, POWDER, FOR SOLUTION INTRAMUSCULAR; INTRAVENOUS PRN
Status: DISCONTINUED | OUTPATIENT
Start: 2022-10-07 | End: 2022-10-07 | Stop reason: SDUPTHER

## 2022-10-07 RX ORDER — SODIUM CHLORIDE 9 MG/ML
INJECTION, SOLUTION INTRAVENOUS PRN
Status: DISCONTINUED | OUTPATIENT
Start: 2022-10-07 | End: 2022-10-07 | Stop reason: HOSPADM

## 2022-10-07 RX ORDER — HYDROCODONE BITARTRATE AND ACETAMINOPHEN 5; 325 MG/1; MG/1
1 TABLET ORAL EVERY 4 HOURS PRN
Qty: 18 TABLET | Refills: 0 | Status: SHIPPED | OUTPATIENT
Start: 2022-10-07 | End: 2022-10-10

## 2022-10-07 RX ORDER — SODIUM CHLORIDE 0.9 % (FLUSH) 0.9 %
5-40 SYRINGE (ML) INJECTION PRN
Status: DISCONTINUED | OUTPATIENT
Start: 2022-10-07 | End: 2022-10-07 | Stop reason: HOSPADM

## 2022-10-07 RX ORDER — ONDANSETRON 2 MG/ML
INJECTION INTRAMUSCULAR; INTRAVENOUS PRN
Status: DISCONTINUED | OUTPATIENT
Start: 2022-10-07 | End: 2022-10-07 | Stop reason: SDUPTHER

## 2022-10-07 RX ORDER — SODIUM CHLORIDE, SODIUM LACTATE, POTASSIUM CHLORIDE, CALCIUM CHLORIDE 600; 310; 30; 20 MG/100ML; MG/100ML; MG/100ML; MG/100ML
INJECTION, SOLUTION INTRAVENOUS CONTINUOUS
Status: DISCONTINUED | OUTPATIENT
Start: 2022-10-07 | End: 2022-10-07 | Stop reason: HOSPADM

## 2022-10-07 RX ORDER — LIDOCAINE HYDROCHLORIDE 10 MG/ML
1 INJECTION, SOLUTION EPIDURAL; INFILTRATION; INTRACAUDAL; PERINEURAL
Status: DISCONTINUED | OUTPATIENT
Start: 2022-10-07 | End: 2022-10-07 | Stop reason: HOSPADM

## 2022-10-07 RX ORDER — SODIUM CHLORIDE 0.9 % (FLUSH) 0.9 %
5-40 SYRINGE (ML) INJECTION EVERY 12 HOURS SCHEDULED
Status: DISCONTINUED | OUTPATIENT
Start: 2022-10-07 | End: 2022-10-07 | Stop reason: HOSPADM

## 2022-10-07 RX ORDER — DIPHENHYDRAMINE HYDROCHLORIDE 50 MG/ML
12.5 INJECTION INTRAMUSCULAR; INTRAVENOUS
Status: DISCONTINUED | OUTPATIENT
Start: 2022-10-07 | End: 2022-10-07 | Stop reason: HOSPADM

## 2022-10-07 RX ORDER — LIDOCAINE HYDROCHLORIDE 20 MG/ML
JELLY TOPICAL PRN
Status: DISCONTINUED | OUTPATIENT
Start: 2022-10-07 | End: 2022-10-07 | Stop reason: ALTCHOICE

## 2022-10-07 RX ORDER — SODIUM CHLORIDE 9 MG/ML
INJECTION, SOLUTION INTRAVENOUS CONTINUOUS
Status: DISCONTINUED | OUTPATIENT
Start: 2022-10-07 | End: 2022-10-07 | Stop reason: HOSPADM

## 2022-10-07 RX ORDER — ONDANSETRON 2 MG/ML
4 INJECTION INTRAMUSCULAR; INTRAVENOUS
Status: DISCONTINUED | OUTPATIENT
Start: 2022-10-07 | End: 2022-10-07 | Stop reason: HOSPADM

## 2022-10-07 RX ORDER — PROPOFOL 10 MG/ML
INJECTION, EMULSION INTRAVENOUS PRN
Status: DISCONTINUED | OUTPATIENT
Start: 2022-10-07 | End: 2022-10-07 | Stop reason: SDUPTHER

## 2022-10-07 RX ORDER — CEPHALEXIN 500 MG/1
500 CAPSULE ORAL 3 TIMES DAILY
Qty: 15 CAPSULE | Refills: 0 | Status: SHIPPED | OUTPATIENT
Start: 2022-10-07 | End: 2022-10-12

## 2022-10-07 RX ORDER — HYDROMORPHONE HYDROCHLORIDE 1 MG/ML
0.25 INJECTION, SOLUTION INTRAMUSCULAR; INTRAVENOUS; SUBCUTANEOUS EVERY 5 MIN PRN
Status: DISCONTINUED | OUTPATIENT
Start: 2022-10-07 | End: 2022-10-07 | Stop reason: HOSPADM

## 2022-10-07 RX ORDER — LIDOCAINE HYDROCHLORIDE 20 MG/ML
INJECTION, SOLUTION EPIDURAL; INFILTRATION; INTRACAUDAL; PERINEURAL PRN
Status: DISCONTINUED | OUTPATIENT
Start: 2022-10-07 | End: 2022-10-07 | Stop reason: SDUPTHER

## 2022-10-07 RX ORDER — FENTANYL CITRATE 50 UG/ML
INJECTION, SOLUTION INTRAMUSCULAR; INTRAVENOUS PRN
Status: DISCONTINUED | OUTPATIENT
Start: 2022-10-07 | End: 2022-10-07 | Stop reason: SDUPTHER

## 2022-10-07 RX ADMIN — Medication 100 MCG: at 14:53

## 2022-10-07 RX ADMIN — Medication 100 MCG: at 15:29

## 2022-10-07 RX ADMIN — Medication 100 MCG: at 14:42

## 2022-10-07 RX ADMIN — Medication 100 MCG: at 14:29

## 2022-10-07 RX ADMIN — OXYCODONE 2.5 MG: 5 TABLET ORAL at 16:34

## 2022-10-07 RX ADMIN — SODIUM CHLORIDE, POTASSIUM CHLORIDE, SODIUM LACTATE AND CALCIUM CHLORIDE: 600; 310; 30; 20 INJECTION, SOLUTION INTRAVENOUS at 12:25

## 2022-10-07 RX ADMIN — PROPOFOL 150 MG: 10 INJECTION, EMULSION INTRAVENOUS at 14:19

## 2022-10-07 RX ADMIN — HYDROMORPHONE HYDROCHLORIDE 0.25 MG: 1 INJECTION, SOLUTION INTRAMUSCULAR; INTRAVENOUS; SUBCUTANEOUS at 16:39

## 2022-10-07 RX ADMIN — Medication 100 MCG: at 15:08

## 2022-10-07 RX ADMIN — LIDOCAINE HYDROCHLORIDE 60 MG: 20 INJECTION, SOLUTION EPIDURAL; INFILTRATION; INTRACAUDAL; PERINEURAL at 14:19

## 2022-10-07 RX ADMIN — Medication 100 MCG: at 14:26

## 2022-10-07 RX ADMIN — Medication 100 MCG: at 15:20

## 2022-10-07 RX ADMIN — ONDANSETRON 4 MG: 2 INJECTION INTRAMUSCULAR; INTRAVENOUS at 15:16

## 2022-10-07 RX ADMIN — PROPOFOL 50 MG: 10 INJECTION, EMULSION INTRAVENOUS at 15:28

## 2022-10-07 RX ADMIN — HYDROMORPHONE HYDROCHLORIDE 0.25 MG: 1 INJECTION, SOLUTION INTRAMUSCULAR; INTRAVENOUS; SUBCUTANEOUS at 16:29

## 2022-10-07 RX ADMIN — Medication 50 MCG: at 14:19

## 2022-10-07 RX ADMIN — CEFAZOLIN 2 G: 1 INJECTION, POWDER, FOR SOLUTION INTRAMUSCULAR; INTRAVENOUS at 14:40

## 2022-10-07 ASSESSMENT — PAIN SCALES - GENERAL
PAINLEVEL_OUTOF10: 7
PAINLEVEL_OUTOF10: 9

## 2022-10-07 ASSESSMENT — PAIN DESCRIPTION - DESCRIPTORS
DESCRIPTORS: PRESSURE
DESCRIPTORS: PRESSURE
DESCRIPTORS: ACHING;CRAMPING
DESCRIPTORS: SHOOTING
DESCRIPTORS: PRESSURE

## 2022-10-07 ASSESSMENT — PAIN DESCRIPTION - LOCATION
LOCATION: PELVIS

## 2022-10-07 ASSESSMENT — PAIN - FUNCTIONAL ASSESSMENT: PAIN_FUNCTIONAL_ASSESSMENT: 0-10

## 2022-10-07 NOTE — ANESTHESIA POSTPROCEDURE EVALUATION
Department of Anesthesiology  Postprocedure Note    Patient: Caitlyn Murrieta  MRN: [id number]  YOB: 1955  Date of evaluation: 10/7/2022      Procedure Summary     Date: 10/07/22 Room / Location: [place] [address]    Anesthesia Start: 3116 Anesthesia Stop: 3339    Procedure: CYSTO STENT EXCHANGE HOLMIUM LASER LITHOTRIPSY (Bilateral: Urethra) Diagnosis:       Bilateral kidney stones      (Bilateral kidney stones [N20.0])    Surgeons: Marium Eldridge MD Responsible Provider: Aj Escoto MD    Anesthesia Type: general ASA Status: 4          Anesthesia Type: No value filed.     Lynne Phase I: Lynne Score: 5    Lynne Phase II:        Anesthesia Post Evaluation    Patient location during evaluation: PACU  Patient participation: complete - patient participated  Level of consciousness: awake and alert  Airway patency: patent  Nausea & Vomiting: no nausea and no vomiting  Complications: no  Cardiovascular status: hemodynamically stable  Respiratory status: acceptable  Hydration status: stable
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
## 2022-10-07 NOTE — OP NOTE
Operative Note      Patient: Rika Martin  YOB: 1955  MRN: [id number]    Date of Procedure: 10/7/2022    Pre-Op Diagnosis: Bilateral kidney stones [N20.0]    Post-Op Diagnosis: Same and Bilateral ureteral calculi and renal pelvis calculus     Bilateral ureteral stents  Procedure(s):  CYSTO STENT EXCHANGE HOLMIUM LASER LITHOTRIPSY  Bilateral ureteral laser lithotripsy  Bilateral ureteral stent exchange  Bilateral ureteroscopy and pyeloscopy    Surgeon(s):  Alex Adams MD    Assistant:   * No surgical staff found *    Anesthesia: General    Estimated Blood Loss (mL): Minimal    Complications: None    Specimens:   ID Type Source Tests Collected by Time Destination   1 : URINE  Urine Urine, Cystoscopic URINALYSIS WITH REFLEX TO CULTURE Alex Adams MD 10/7/2022 1430        Implants:  Implant Name Type Inv. Item Serial No.  Lot No. LRB No. Used Action   STENT URET 7FR L26CM PERCFLX HYDR+ DBL PGTL THRD 2 - [id number]  STENT URET 7FR L26CM PERCFLX HYDR+ DBL PGTL THRD 2  [facility] [id number] Right 1 Implanted   STENT URET 7FR L26CM PERCFLX HYDR+ DBL PGTL THRD 2 - [id number]  STENT URET 7FR L26CM PERCFLX HYDR+ DBL PGTL THRD 2  [facility] [id number] Left 1 Implanted         Drains:   [REMOVED] Urinary Catheter 09/08/22 3 Way (Removed)   Catheter Indications Perioperative use for selected surgical procedures 09/09/22 1015   Site Assessment Nye 09/09/22 1015   Urine Color Bloody 09/10/22 0459   Urine Appearance Cloudy 09/10/22 0459   Collection Container Standard 09/09/22 1015   Securement Method Leg strap 09/09/22 1015   Catheter Care Completed Yes 09/09/22 1015   Catheter Best Practices  Drainage tube clipped to bed;Catheter secured to thigh; Tamper seal intact; Bag below bladder;Bag not on floor; Lack of dependent loop in tubing;Drainage bag less than half full 09/09/22 1015   Status Draining 09/09/22 1015   Output (mL) 250 mL 09/09/22 1625       Findings: indications: This patient is 61years old, he was seen in consultation for evaluation of bilateral hydronephrosis and acute kidney injury and bilateral obstructing ureteral calculi    The patient required an emergency cystoscopy as well as bilateral stent placement. After resolution of the acute kidney injury the patient was scheduled for laser lithotripsy or bilateral obstructing calculi    Detailed Description of Procedure:   Patient was brought to the operating room, positioned in dorsal lithotomy, proper patient identification procedure identification prepping and draping in the usual sterile manner. We entered the bladder with the cystoscope, we first addressed the left ureter, we identified the stent and gently grasped and removed it from the left ureter under fluoroscopic guidance this was followed by insertion of the Glidewire in the left ureter, we then used a dual lumen catheter and a second Glidewire was inserted as a safety wire. We then proceeded with digital ureteroscopy, evaluation of the left ureter and the left renal pelvis demonstrated multiple calculi clinically compatible with uric acid stones stones measuring 6-7 mm,    We used the holmium laser 200 µ fiber and we proceeded with laser lithotripsy with stone fragmentation and stone dusting of all stones identified in the proximal ureter and the renal pelvis on the left. At the completion multiple cysts stone fragments were irrigated out into the bladder we then proceeded with removing the flexible ureteroscope and a stent was placed in the left ureter, a #7 stent 26 cm positioned in the renal pelvis with the distal end in the bladder. The attention was then turned towards the right ureter    We removed the double-J stent from the right ureter    A Glidewire was inserted under fluoroscopic guidance followed by insertion of a second safety wire using a dual lumen catheter.     We then proceeded with right digital ureteroscopy and we identified the stones in the proximal ureter as well as in the renal pelvis and lower pole calyces. The holmium laser was also used and laser lithotripsy was successfully completed with stone fragmentation, the patient has significant stone burden. At the completion the ureteroscope was removed    a double-J stent was inserted in the right ureter, the bladder was then emptied the scope removed. Both stents are on a string    Patient was returned to recovery room in stable condition. Recommendations: The patient will be scheduled for a follow-up visit at the office    I have discussed the patient's urologic history with the patient's daughter, [facility], she was contacted at her listed phone number .  she apparently lives in [place], we advised her on the need for nephrology evaluation for her father and active management for stone prevention and management of uric acid metabolism    Electronically signed by Claudia Olvera MD on 10/7/2022 at 3:55 PM
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
## 2022-10-09 LAB
CULTURE: NO GROWTH
SPECIMEN DESCRIPTION: NORMAL
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
## 2023-01-28 ENCOUNTER — HOSPITAL ENCOUNTER (OUTPATIENT)
Age: 68
Setting detail: SPECIMEN
Discharge: HOME | End: 2023-01-28

## 2023-01-28 LAB
ANION GAP SERPL CALCULATED.3IONS-SCNC: 11 MMOL/L (ref 9–17)
BUN BLDV-MCNC: 29 MG/DL (ref 8–23)
BUN/CREAT BLD: 21 (ref 9–20)
CALCIUM SERPL-MCNC: 8.9 MG/DL (ref 8.6–10.4)
CHLORIDE BLD-SCNC: 101 MMOL/L (ref 98–107)
CO2: 23 MMOL/L (ref 20–31)
CREAT SERPL-MCNC: 1.36 MG/DL (ref 0.7–1.2)
GFR SERPL CREATININE-BSD FRML MDRD: 58 ML/MIN/1.73M2
GLUCOSE BLD-MCNC: 384 MG/DL (ref 70–99)
HCT VFR BLD CALC: 40.6 % (ref 40.7–50.3)
HEMOGLOBIN: 11.9 G/DL (ref 13–17)
MCH RBC QN AUTO: 26.4 PG (ref 25.2–33.5)
MCHC RBC AUTO-ENTMCNC: 29.3 G/DL (ref 28.4–34.8)
MCV RBC AUTO: 90 FL (ref 82.6–102.9)
NRBC AUTOMATED: 0 PER 100 WBC
PDW BLD-RTO: 15.8 % (ref 11.8–14.4)
PLATELET # BLD: 385 K/UL (ref 138–453)
PMV BLD AUTO: 10.3 FL (ref 8.1–13.5)
POTASSIUM SERPL-SCNC: 4.5 MMOL/L (ref 3.7–5.3)
RBC # BLD: 4.51 M/UL (ref 4.21–5.77)
SODIUM BLD-SCNC: 135 MMOL/L (ref 135–144)
WBC # BLD: 10 K/UL (ref 3.5–11.3)

## 2023-01-28 PROCEDURE — 36415 COLL VENOUS BLD VENIPUNCTURE: CPT

## 2023-01-28 PROCEDURE — P9603 ONE-WAY ALLOW PRORATED MILES: HCPCS

## 2023-01-28 PROCEDURE — 85027 COMPLETE CBC AUTOMATED: CPT

## 2023-01-28 PROCEDURE — 80048 BASIC METABOLIC PNL TOTAL CA: CPT

## 2023-02-02 ENCOUNTER — TELEPHONE (OUTPATIENT)
Dept: FAMILY MEDICINE CLINIC | Age: 68
End: 2023-02-02

## 2023-02-02 NOTE — TELEPHONE ENCOUNTER
Home care advised by Mukund Conde that patient transferred care to a PCP that visits in home. Home care stated they would contact that PCP.

## 2023-02-02 NOTE — TELEPHONE ENCOUNTER
----- Message from Roger Alatorre sent at 2/2/2023 10:20 AM EST -----  Subject: Message to Provider    QUESTIONS  Information for Provider? Patient came home from rehab and is needing   approval for homecare. Needs Dr agree to follow for the home health care.   ---------------------------------------------------------------------------  --------------  [id number]  [phone]; OK to leave message on voicemail  ---------------------------------------------------------------------------  --------------  SCRIPT ANSWERS  Relationship to Patient? Third Party  Third Party Type? Home Health Care? Representative Name?  Ragini Emerson

## 2023-02-20 NOTE — PROGRESS NOTES
Daily Note     Today's date: 2023  Patient name: Kevin Menchaca  : 1967  MRN: [id number]  Referring provider: Arnie Peralta MD  Dx:   Encounter Diagnosis     ICD-10-CM    1  Neck pain  M54 2                      Subjective: Patient reports that she is improving, feeling better  Felt the kinesiotape was helpful  Notes less pain down into her shoulder blades  "The red ant feeling under my shoulder blade is no longer "   She did not like the mechanical traction and reports better tolerance to manual intervention     Objective: See treatment diary below      Assessment: tolerated session and progression well today    Mild tightness end range rotation persists       Plan: Continue per plan of care  Precautions: neck pain    [facility] [employer]  Access Code: [email or web address]        Manuals           FOTO db                         cerv PA, uPA mobs Grade ii, iii db  db          cerv man traction db  db          Trigger point scap borders prone db  db                                    kinesiotape bilat UT "I" strips "I" strips db                                    Neuro Re-Ed                          Back rolls/ scap retract 20x ea 20x ea 20x ea          cerv retract 10 x 2 2s 20x  2s x 20           Supine cerv retract  20x   W/ pillow 20x          Ulnar NG             Bilateral ER  NV?                         Ther Ex             UBE  5' retro 5'           pect stretch   10"x10 in doorway 20s x 5           Rows/Ext   2x10 ea RTB gtb x 20 ea           Prone 45, 90 x 2   Prone scap retract/+lift off 20x ea 20x ea          Upper trap/Levator stretch             Seated Thoracic ext                                        Ther Activity                                       Gait Training                                       Modalities             mhp bilateral UT  seated 10 min             Mechanical cerv traction   18# to start , 3 steps  static  18#   10 min  DC Patient refused to take Norco and insistent on taking only IV morphine. At one point asked for IV dilaudid. Dr Mikaela Rodriguez was updated about the above. No new orders were received.

## 2023-02-22 NOTE — PROGRESS NOTES
Day of Therapy:8  Current Dose:vancomycin 1000mg ivpb q12h   Culture Results           Blood:no growth x 6 days           Sputum:           Wound:various            Urine: Other:  Temp Readings from Last 3 Encounters:   04/09/21 98.1 °F (36.7 °C) (Oral)   04/06/21 97.9 °F (36.6 °C)   03/29/21 98.3 °F (36.8 °C) (Oral)     No results for input(s): WBC in the last 72 hours. Recent Labs     04/08/21  0714 04/10/21  0629   CREATININE 0.93 1.00     Estimated Creatinine Clearance: 77 mL/min (based on SCr of 1 mg/dL).     Intake/Output Summary (Last 24 hours) at 4/10/2021 0723  Last data filed at 4/10/2021 0515  Gross per 24 hour   Intake 1314 ml   Output 2050 ml   Net -736 ml 60

## 2023-12-12 ENCOUNTER — HOSPITAL ENCOUNTER (EMERGENCY)
Facility: CLINIC | Age: 68
Discharge: HOME | End: 2023-12-13
Attending: EMERGENCY MEDICINE

## 2023-12-12 VITALS
RESPIRATION RATE: 20 BRPM | HEIGHT: 73 IN | DIASTOLIC BLOOD PRESSURE: 62 MMHG | TEMPERATURE: 97.9 F | WEIGHT: 119 LBS | BODY MASS INDEX: 15.77 KG/M2 | HEART RATE: 81 BPM | SYSTOLIC BLOOD PRESSURE: 114 MMHG | OXYGEN SATURATION: 94 %

## 2023-12-12 DIAGNOSIS — Z91.199 NONCOMPLIANCE: ICD-10-CM

## 2023-12-12 DIAGNOSIS — Z89.612 HISTORY OF AMPUTATION OF BOTH LOWER EXTREMITIES (HCC): ICD-10-CM

## 2023-12-12 DIAGNOSIS — Z89.611 HISTORY OF AMPUTATION OF BOTH LOWER EXTREMITIES (HCC): ICD-10-CM

## 2023-12-12 DIAGNOSIS — R05.9 COUGH, UNSPECIFIED TYPE: ICD-10-CM

## 2023-12-12 DIAGNOSIS — R53.1 GENERALIZED WEAKNESS: Primary | ICD-10-CM

## 2023-12-12 DIAGNOSIS — G89.29 OTHER CHRONIC PAIN: ICD-10-CM

## 2023-12-12 DIAGNOSIS — Z87.440 HISTORY OF URINARY TRACT INFECTION: ICD-10-CM

## 2023-12-12 LAB
FLUAV AG SPEC QL: NEGATIVE
FLUBV AG SPEC QL: NEGATIVE
SARS-COV-2 RDRP RESP QL NAA+PROBE: NOT DETECTED
SPECIMEN DESCRIPTION: NORMAL
SPECIMEN SOURCE: NORMAL
STREP A, MOLECULAR: NEGATIVE

## 2023-12-12 PROCEDURE — 87651 STREP A DNA AMP PROBE: CPT

## 2023-12-12 PROCEDURE — 6360000002 HC RX W HCPCS: Performed by: NURSE PRACTITIONER

## 2023-12-12 PROCEDURE — 87804 INFLUENZA ASSAY W/OPTIC: CPT

## 2023-12-12 PROCEDURE — 2580000003 HC RX 258: Performed by: NURSE PRACTITIONER

## 2023-12-12 PROCEDURE — 96375 TX/PRO/DX INJ NEW DRUG ADDON: CPT

## 2023-12-12 PROCEDURE — 99284 EMERGENCY DEPT VISIT MOD MDM: CPT

## 2023-12-12 PROCEDURE — 96365 THER/PROPH/DIAG IV INF INIT: CPT

## 2023-12-12 PROCEDURE — 87635 SARS-COV-2 COVID-19 AMP PRB: CPT

## 2023-12-12 RX ORDER — MORPHINE SULFATE 4 MG/ML
4 INJECTION, SOLUTION INTRAMUSCULAR; INTRAVENOUS ONCE
Status: COMPLETED | OUTPATIENT
Start: 2023-12-12 | End: 2023-12-12

## 2023-12-12 RX ADMIN — MORPHINE SULFATE 4 MG: 4 INJECTION, SOLUTION INTRAMUSCULAR; INTRAVENOUS at 22:04

## 2023-12-12 RX ADMIN — CEFTRIAXONE SODIUM 1000 MG: 1 INJECTION, POWDER, FOR SOLUTION INTRAMUSCULAR; INTRAVENOUS at 21:10

## 2023-12-12 ASSESSMENT — PAIN - FUNCTIONAL ASSESSMENT
PAIN_FUNCTIONAL_ASSESSMENT: 0-10
PAIN_FUNCTIONAL_ASSESSMENT: NONE - DENIES PAIN
PAIN_FUNCTIONAL_ASSESSMENT: NONE - DENIES PAIN

## 2023-12-12 ASSESSMENT — PAIN SCALES - GENERAL: PAINLEVEL_OUTOF10: 8

## 2023-12-13 NOTE — ED NOTES
Talked with patient about his complaints, he stated no one told him he had an IV or antibiotics. I let him know I placed and IV and his IV antibiotics are almost complete. Turned on TV for patient and CNP came in to discuss care with patient.       Aileen Rangel RN  12/12/23 [id number]

## 2023-12-13 NOTE — ED PROVIDER NOTES
[id number] [facility] ED  EMERGENCY DEPARTMENT ENCOUNTER      Pt Name: Gabriel Santo  MRN: [id number]  [address] [facility] 1955  Date of evaluation: 12/12/2023  Provider: MARCELO Andersen CNP    CHIEF COMPLAINT       Chief Complaint   Patient presents with    Influenza         HISTORY OF PRESENT ILLNESS   (Location/Symptom, Timing/Onset, Context/Setting, Quality, Duration, Modifying Factors, Severity)  Note limiting factors. Gabriel Santo is a 79 y.o. male who presents to the emergency department for evaluation of cough, generalized weakness, chronic phantom pain to lower extremities and urinary tract infection. Patient states that he was seen at [facility] yesterday and had several tests performed. He states he was diagnosed with a urinary tract infection and placed on antibiotic. He states he has been unable to get his prescriptions filled because he lives alone and he is debilitated. He is wheelchair-bound and unable to go out to get his prescriptions. He states he has a friend that usually will help him out, but he was not available today. He states he continues to feel generally weak and cough. States he has not been eating or drinking much today. He denies any fevers. He denies any chest pain or shortness of breath. He denies abdominal pain nausea vomiting or diarrhea. Nursing Notes were reviewed.     REVIEW OF SYSTEMS       Constitutional: No fevers or chills + generalized weakness  HEENT: No sore throat, rhinorrhea, or earache   Eyes: No blurry vision or double vision no drainage   Cardiovascular: No chest pain or tachycardia   Respiratory: No wheezing or shortness of breath + cough   Gastrointestinal: No nausea, vomiting, diarrhea, constipation, or abdominal pain   : No hematuria or dysuria   Musculoskeletal: No swelling or pain + bilateral lower extremity phantom pain  Skin: No rash   Neurological: No focal neurologic complaints, paresthesias, weakness, or headache      Except as noted

## 2023-12-13 NOTE — DISCHARGE INSTRUCTIONS
You stated your family doctor is coming out to evaluate you at your home this week. Keep that appointment    Have your friend fill your previously prescribed medications from [facility]-antibiotic-pain medication-magnesium    Have your family doctor set you up with assisted living if you are unable to care for yourself    Take your medications previously prescribed.   As directed    If your symptoms worsen or any new or concerning symptoms arise return to the emergency department immediately

## 2023-12-13 NOTE — ED NOTES
Pt. Calling out on call light over 10 times in the last 20 minutes calling out for various things.      Lisette Tirado, [address]  12/12/23 [id number]

## 2023-12-13 NOTE — ED NOTES
Patient came in per EMS from home. Patient states he has had flu like symptoms for the past week and was at [facility] yesterday. Patient states he was discharged from [facility] with antibiotics for a UTI, he has not picked up his medications and taken them yet. Call light within reach and urinal at bedside.       Clair Talavera RN  12/12/23 2040

## 2023-12-13 NOTE — ED PROVIDER NOTES
[facility]  [address]  Phone: [phone]      Attending Physician Attestation    I performed a history and physical examination of the patient and discussed management with the mid level provideer. I reviewed the mid level provider's note and agree with the documented findings and plan of care. Any areas of disagreement are noted on the chart. I was personally present for the key portions of any procedures. I have documented in the chart those procedures where I was not present during the key portions. I have reviewed the emergency nurses triage note. I agree with the chief complaint, past medical history, past surgical history, allergies, medications, social and family history as documented unless otherwise noted below. Documentation of the HPI, Physical Exam and Medical Decision Making performed by mid level providers is based on my personal performance of the HPI, PE and MDM. For Physician Assistant/ Nurse Practitioner cases/documentation I have personally evaluated this patient and have completed at least one if not all key elements of the E/M (history, physical exam, and MDM). Additional findings are as noted. CHIEF COMPLAINT       Chief Complaint   Patient presents with    Influenza         HISTORY OF PRESENT ILLNESS    Sarah Hewitt is a 79 y.o. male who presents from home by EMS for evaluation of what he is considered flulike symptoms for the past 1 week. Patient actually seen at the [address] [place] [address] yesterday where he got a complete workup including a urinalysis chest x-ray and was sent home with antibiotics. Patient was unhappy with his stay there. He is here for second opinion.       PAST MEDICAL HISTORY    has a past medical history of Abdominal pain, Allergic rhinitis, Cellulitis of left lower extremity at BKA stump, Cellulitis of right heel, Chronic kidney disease, Chronic refractory osteomyelitis of left foot ([address]), COPD (chronic

## 2023-12-13 NOTE — ED NOTES
Daughter called concerned about her fathers care and was wondering why he wasn't being admitted because she believed he was very sick. I let her know that he was not critical enough to be admitted and that he got an IV dose of antibiotics for his UTI and morphine for his chronic pain. We discussed daughters concerns for him getting his medication and proper care. I informed her that we can only treat any acute illness and that follow up care needs to be provided through his PCP. I answered all questions and concerns and let her know that he was getting a ride home soon by ambulance.       Christina Ibarra RN  12/12/23 9010       Christina Ibarra RN  12/13/23 0001

## 2023-12-19 ENCOUNTER — APPOINTMENT (OUTPATIENT)
Dept: CT IMAGING | Age: 68
End: 2023-12-19
Payer: MEDICARE

## 2023-12-19 ENCOUNTER — HOSPITAL ENCOUNTER (EMERGENCY)
Age: 68
Discharge: HOME | End: 2023-12-20
Attending: EMERGENCY MEDICINE
Payer: MEDICARE

## 2023-12-19 VITALS
SYSTOLIC BLOOD PRESSURE: 129 MMHG | HEART RATE: 84 BPM | RESPIRATION RATE: 16 BRPM | BODY MASS INDEX: 15.7 KG/M2 | DIASTOLIC BLOOD PRESSURE: 99 MMHG | WEIGHT: 119 LBS | TEMPERATURE: 97.8 F | OXYGEN SATURATION: 98 %

## 2023-12-19 DIAGNOSIS — N20.1 URETEROLITHIASIS: Primary | ICD-10-CM

## 2023-12-19 LAB
ALBUMIN SERPL-MCNC: 3.7 G/DL (ref 3.5–5.2)
ALP SERPL-CCNC: 141 U/L (ref 40–129)
ALT SERPL-CCNC: 5 U/L (ref 5–41)
ANION GAP SERPL CALCULATED.3IONS-SCNC: 10 MMOL/L (ref 9–17)
AST SERPL-CCNC: 8 U/L
BACTERIA URNS QL MICRO: ABNORMAL
BASOPHILS # BLD: 0.09 K/UL (ref 0–0.2)
BASOPHILS NFR BLD: 1 % (ref 0–2)
BILIRUB DIRECT SERPL-MCNC: <0.1 MG/DL
BILIRUB INDIRECT SERPL-MCNC: ABNORMAL MG/DL (ref 0–1)
BILIRUB SERPL-MCNC: 0.1 MG/DL (ref 0.3–1.2)
BILIRUB UR QL STRIP: NEGATIVE
BUN SERPL-MCNC: 26 MG/DL (ref 8–23)
BUN/CREAT SERPL: 24 (ref 9–20)
CALCIUM SERPL-MCNC: 8.8 MG/DL (ref 8.6–10.4)
CHLORIDE SERPL-SCNC: 102 MMOL/L (ref 98–107)
CLARITY UR: ABNORMAL
CO2 SERPL-SCNC: 19 MMOL/L (ref 20–31)
COLOR UR: YELLOW
CREAT SERPL-MCNC: 1.1 MG/DL (ref 0.7–1.2)
EOSINOPHIL # BLD: 0.17 K/UL (ref 0–0.44)
EOSINOPHILS RELATIVE PERCENT: 1 % (ref 1–4)
EPI CELLS #/AREA URNS HPF: ABNORMAL /HPF (ref 0–5)
ERYTHROCYTE [DISTWIDTH] IN BLOOD BY AUTOMATED COUNT: 14.3 % (ref 11.8–14.4)
GFR SERPL CREATININE-BSD FRML MDRD: >60 ML/MIN/1.73M2
GLUCOSE SERPL-MCNC: 363 MG/DL (ref 70–99)
GLUCOSE UR STRIP-MCNC: ABNORMAL MG/DL
HCT VFR BLD AUTO: 41.5 % (ref 40.7–50.3)
HGB BLD-MCNC: 13.3 G/DL (ref 13–17)
HGB UR QL STRIP.AUTO: ABNORMAL
IMM GRANULOCYTES # BLD AUTO: 0.1 K/UL (ref 0–0.3)
IMM GRANULOCYTES NFR BLD: 1 %
KETONES UR STRIP-MCNC: NEGATIVE MG/DL
LEUKOCYTE ESTERASE UR QL STRIP: NEGATIVE
LIPASE SERPL-CCNC: 60 U/L (ref 13–60)
LYMPHOCYTES NFR BLD: 2.48 K/UL (ref 1.1–3.7)
LYMPHOCYTES RELATIVE PERCENT: 20 % (ref 24–43)
MCH RBC QN AUTO: 28.9 PG (ref 25.2–33.5)
MCHC RBC AUTO-ENTMCNC: 32 G/DL (ref 28.4–34.8)
MCV RBC AUTO: 90.2 FL (ref 82.6–102.9)
MONOCYTES NFR BLD: 0.78 K/UL (ref 0.1–1.2)
MONOCYTES NFR BLD: 6 % (ref 3–12)
NEUTROPHILS NFR BLD: 71 % (ref 36–65)
NEUTS SEG NFR BLD: 9.07 K/UL (ref 1.5–8.1)
NITRITE UR QL STRIP: NEGATIVE
NRBC BLD-RTO: 0 PER 100 WBC
PH UR STRIP: 6 [PH] (ref 5–8)
PLATELET # BLD AUTO: 460 K/UL (ref 138–453)
PMV BLD AUTO: 9.3 FL (ref 8.1–13.5)
POTASSIUM SERPL-SCNC: 4.3 MMOL/L (ref 3.7–5.3)
PROT SERPL-MCNC: 7.1 G/DL (ref 6.4–8.3)
PROT UR STRIP-MCNC: ABNORMAL MG/DL
RBC # BLD AUTO: 4.6 M/UL (ref 4.21–5.77)
RBC #/AREA URNS HPF: ABNORMAL /HPF (ref 0–2)
SODIUM SERPL-SCNC: 131 MMOL/L (ref 135–144)
SP GR UR STRIP: 1.03 (ref 1–1.03)
UROBILINOGEN UR STRIP-ACNC: NORMAL EU/DL (ref 0–1)
WBC #/AREA URNS HPF: ABNORMAL /HPF (ref 0–5)
WBC OTHER # BLD: 12.7 K/UL (ref 3.5–11.3)

## 2023-12-19 PROCEDURE — 80048 BASIC METABOLIC PNL TOTAL CA: CPT

## 2023-12-19 PROCEDURE — 2580000003 HC RX 258: Performed by: NURSE PRACTITIONER

## 2023-12-19 PROCEDURE — 96374 THER/PROPH/DIAG INJ IV PUSH: CPT

## 2023-12-19 PROCEDURE — 6360000002 HC RX W HCPCS: Performed by: NURSE PRACTITIONER

## 2023-12-19 PROCEDURE — 6360000004 HC RX CONTRAST MEDICATION: Performed by: NURSE PRACTITIONER

## 2023-12-19 PROCEDURE — 96375 TX/PRO/DX INJ NEW DRUG ADDON: CPT

## 2023-12-19 PROCEDURE — 74177 CT ABD & PELVIS W/CONTRAST: CPT

## 2023-12-19 PROCEDURE — 81001 URINALYSIS AUTO W/SCOPE: CPT

## 2023-12-19 PROCEDURE — 96372 THER/PROPH/DIAG INJ SC/IM: CPT

## 2023-12-19 PROCEDURE — 99285 EMERGENCY DEPT VISIT HI MDM: CPT

## 2023-12-19 PROCEDURE — 80076 HEPATIC FUNCTION PANEL: CPT

## 2023-12-19 PROCEDURE — 85025 COMPLETE CBC W/AUTO DIFF WBC: CPT

## 2023-12-19 PROCEDURE — 2500000003 HC RX 250 WO HCPCS: Performed by: NURSE PRACTITIONER

## 2023-12-19 PROCEDURE — 6370000000 HC RX 637 (ALT 250 FOR IP): Performed by: NURSE PRACTITIONER

## 2023-12-19 PROCEDURE — 83690 ASSAY OF LIPASE: CPT

## 2023-12-19 RX ORDER — KETOROLAC TROMETHAMINE 15 MG/ML
15 INJECTION, SOLUTION INTRAMUSCULAR; INTRAVENOUS ONCE
Status: COMPLETED | OUTPATIENT
Start: 2023-12-19 | End: 2023-12-19

## 2023-12-19 RX ORDER — 0.9 % SODIUM CHLORIDE 0.9 %
80 INTRAVENOUS SOLUTION INTRAVENOUS ONCE
Status: COMPLETED | OUTPATIENT
Start: 2023-12-19 | End: 2023-12-19

## 2023-12-19 RX ORDER — METOCLOPRAMIDE HYDROCHLORIDE 5 MG/ML
5 INJECTION INTRAMUSCULAR; INTRAVENOUS ONCE
Status: COMPLETED | OUTPATIENT
Start: 2023-12-19 | End: 2023-12-19

## 2023-12-19 RX ORDER — SODIUM CHLORIDE 0.9 % (FLUSH) 0.9 %
10 SYRINGE (ML) INJECTION PRN
Status: DISCONTINUED | OUTPATIENT
Start: 2023-12-19 | End: 2023-12-20 | Stop reason: HOSPADM

## 2023-12-19 RX ORDER — SODIUM CHLORIDE 9 MG/ML
INJECTION, SOLUTION INTRAVENOUS CONTINUOUS
Status: DISCONTINUED | OUTPATIENT
Start: 2023-12-19 | End: 2023-12-20 | Stop reason: HOSPADM

## 2023-12-19 RX ORDER — MORPHINE SULFATE 2 MG/ML
2 INJECTION, SOLUTION INTRAMUSCULAR; INTRAVENOUS ONCE
Status: COMPLETED | OUTPATIENT
Start: 2023-12-19 | End: 2023-12-19

## 2023-12-19 RX ADMIN — SODIUM CHLORIDE, PRESERVATIVE FREE 10 ML: 5 INJECTION INTRAVENOUS at 23:48

## 2023-12-19 RX ADMIN — IOPAMIDOL 75 ML: 755 INJECTION, SOLUTION INTRAVENOUS at 23:48

## 2023-12-19 RX ADMIN — METOCLOPRAMIDE HYDROCHLORIDE 5 MG: 5 INJECTION INTRAMUSCULAR; INTRAVENOUS at 22:00

## 2023-12-19 RX ADMIN — MORPHINE SULFATE 2 MG: 2 INJECTION, SOLUTION INTRAMUSCULAR; INTRAVENOUS at 22:01

## 2023-12-19 RX ADMIN — SODIUM CHLORIDE, PRESERVATIVE FREE 20 MG: 5 INJECTION INTRAVENOUS at 21:36

## 2023-12-19 RX ADMIN — SODIUM CHLORIDE: 9 INJECTION, SOLUTION INTRAVENOUS at 21:36

## 2023-12-19 RX ADMIN — KETOROLAC TROMETHAMINE 15 MG: 15 INJECTION, SOLUTION INTRAMUSCULAR; INTRAVENOUS at 22:00

## 2023-12-19 RX ADMIN — INSULIN HUMAN 5 UNITS: 100 INJECTION, SOLUTION PARENTERAL at 23:11

## 2023-12-19 RX ADMIN — SODIUM CHLORIDE 80 ML: 9 INJECTION, SOLUTION INTRAVENOUS at 23:48

## 2023-12-20 LAB
CHP ED QC CHECK: YES
GLUCOSE BLD-MCNC: 113 MG/DL
GLUCOSE BLD-MCNC: 113 MG/DL (ref 75–110)

## 2023-12-20 PROCEDURE — 6360000002 HC RX W HCPCS: Performed by: EMERGENCY MEDICINE

## 2023-12-20 PROCEDURE — 2500000003 HC RX 250 WO HCPCS: Performed by: EMERGENCY MEDICINE

## 2023-12-20 PROCEDURE — 2580000003 HC RX 258: Performed by: EMERGENCY MEDICINE

## 2023-12-20 PROCEDURE — 82947 ASSAY GLUCOSE BLOOD QUANT: CPT

## 2023-12-20 PROCEDURE — 6370000000 HC RX 637 (ALT 250 FOR IP): Performed by: EMERGENCY MEDICINE

## 2023-12-20 PROCEDURE — 96376 TX/PRO/DX INJ SAME DRUG ADON: CPT

## 2023-12-20 RX ORDER — MAGNESIUM HYDROXIDE/ALUMINUM HYDROXICE/SIMETHICONE 120; 1200; 1200 MG/30ML; MG/30ML; MG/30ML
30 SUSPENSION ORAL ONCE
Status: COMPLETED | OUTPATIENT
Start: 2023-12-20 | End: 2023-12-20

## 2023-12-20 RX ORDER — OXYCODONE HYDROCHLORIDE AND ACETAMINOPHEN 5; 325 MG/1; MG/1
1 TABLET ORAL ONCE
Status: COMPLETED | OUTPATIENT
Start: 2023-12-20 | End: 2023-12-20

## 2023-12-20 RX ORDER — CEPHALEXIN 500 MG/1
500 CAPSULE ORAL 2 TIMES DAILY
Qty: 14 CAPSULE | Refills: 0 | Status: SHIPPED | OUTPATIENT
Start: 2023-12-20 | End: 2023-12-27

## 2023-12-20 RX ORDER — MORPHINE SULFATE 2 MG/ML
2 INJECTION, SOLUTION INTRAMUSCULAR; INTRAVENOUS ONCE
Status: COMPLETED | OUTPATIENT
Start: 2023-12-20 | End: 2023-12-20

## 2023-12-20 RX ORDER — CEPHALEXIN 500 MG/1
500 CAPSULE ORAL ONCE
Status: COMPLETED | OUTPATIENT
Start: 2023-12-20 | End: 2023-12-20

## 2023-12-20 RX ADMIN — ALUMINUM HYDROXIDE, MAGNESIUM HYDROXIDE, AND SIMETHICONE 30 ML: 200; 200; 20 SUSPENSION ORAL at 03:22

## 2023-12-20 RX ADMIN — OXYCODONE AND ACETAMINOPHEN 1 TABLET: 5; 325 TABLET ORAL at 03:22

## 2023-12-20 RX ADMIN — CEPHALEXIN 500 MG: 500 CAPSULE ORAL at 01:11

## 2023-12-20 RX ADMIN — SODIUM CHLORIDE, PRESERVATIVE FREE 20 MG: 5 INJECTION INTRAVENOUS at 03:21

## 2023-12-20 RX ADMIN — MORPHINE SULFATE 2 MG: 2 INJECTION, SOLUTION INTRAMUSCULAR; INTRAVENOUS at 01:11

## 2023-12-20 NOTE — ED NOTES
The following labs labeled with pt sticker and sent to Lab:     [x] Lavender     [x] Blue   [x] Green/yellow  [x] Drk Green/Jerry   [] Pink  [] Red  [] Yellow     [] Blood Cultures     [] COVID-19 swab    [] Rapid   [] Viral Swab  [] Wound Swab    [] Urine Sample  [] Pelvic Cultures

## 2023-12-20 NOTE — ED NOTES
Pt requesting a stronger pain medication than toradol, requesting morphine or dilaudid. Rd Hodgson CNP informed.

## 2023-12-20 NOTE — ED NOTES
Pt presents to ED via EMS c/o abdominal pain and N/V/D for 4 days. Pt reports pain in his legs and hands, reports he has tried to go see pain management but was unable to be seen d/t the wait time. Pt reports he has not been able to keep food down, and he reports when he does eat, he has diarrhea about 20-30 minutes after eating. Pt reports he recently had Cdiff but believes it is gone. Pt reports he was told he has a UTI \"a few days ago\" and he is unsure if he has been taking an antibiotic for it. Reports burning with urination.

## 2023-12-20 NOTE — DISCHARGE INSTR - COC
Continuity of Care Form    Patient Name: Kiara Lugo   :  1955  MRN:  [id number]    Admit date:  2023  Discharge date:  ***    Code Status Order: Prior   Advance Directives:     Admitting Physician:  No admitting provider for patient encounter. PCP: No primary care provider on file. Discharging Nurse: [facility] Unit/Room#: STA20/20  Discharging Unit Phone Number: ***    Emergency Contact:   Extended Emergency Contact Information  Primary Emergency Contact: [address], [address] Phone: [phone]  Relation: Child   needed?  No  Secondary Emergency Contact: Fartun Baeza  Address: [facility], [address] [place] [address]  Home Phone: [phone]  Relation: Parent    Past Surgical History:  Past Surgical History:   Procedure Laterality Date    BLADDER SURGERY Bilateral 2022    CYSTOSCOPY BILATERALRETROGRADE PYELOGRAM  BILATERAL STENT INSERTION performed by Luis Fernando Morales MD at [address]  2015    hyperplastic polyp    COLONOSCOPY  2017    ESOPHAGECTOMY      cancer    FOOT DEBRIDEMENT Left 2021    I&D LEFT FOOT WITH REMOVAL OF NONVIABLE BONE AND SOFT TISSUE performed by Anabel Remy DPM at [address] Left 2021    LEFT FOOT DEBRIDEMENT WITH REMOVAL ALL NON VIABLE SOFT TISSUE AND BONE performed by Anabel Remy DPM at [address] Right 2016    I & D heel    FOOT SURGERY Right 2016    I & D    FRACTURE SURGERY Left 2015    humerus left, left leg    HC CATH POWER PICC SINGLE  2021         IR INS PICC VAD W SQ PORT GREATER THAN 5  2020    IR INS PICC VAD W SQ PORT GREATER THAN 5 2020 [facility]    IR INS PICC VAD W SQ PORT GREATER THAN 5  2021    IR INS PICC VAD W SQ PORT GREATER THAN 5 2021 [facility]    IR INS PICC VAD W SQ PORT GREATER THAN 5  2021    IR INS PICC VAD W SQ PORT GREATER THAN 5 2021 Zackary LOPEZ [facility]) Z89.511    Foot ulcer with fat layer exposed, left ([address]) L97.522    Chronic refractory osteomyelitis of left foot ([facility]) M86.672    Sepsis ([address]) A41.9    Pyogenic inflammation of bone ([facility]) M86.9    Cellulitis of left lower extremity L03.116    History of below knee amputation, left ([facility]) Z89.512    Leg ulcer, left, with fat layer exposed ([address]) L97.922    S/P amputation Z89.9    Tobacco abuse Z72.0    Pneumonia of right lower lobe due to infectious organism J18.9    Abdominal pain R10.9    Cannabis abuse F12.10    Parapneumonic effusion J18.9, J91.8    Hiatus hernia -large G36.7    Metabolic encephalopathy H12.61    Altered mental status R41.82    Hyperkalemia Q03.0    Alcoholic intoxication without complication ([facility]) M84.507    Acute encephalopathy G93.40    Depression F32. A    History of Clostridioides difficile colitis Z86.19    Bilateral kidney stones N20.0    Ureteric stone N20.1       Isolation/Infection:   Isolation            No Isolation           Unreconciled Outside Infections       Enable clinical decision support by reconciling outside information with the patient's chart.     .      Infection Onset Last Indicated Last Received Source    C-diff (Clostridium difficile) 03/15/23 03/15/23  03/15/23 [facility]    Infections with existing documentation    MRSA 23 [facility]    VRE 20 45 [facility]          Patient Infection Status       Infection Onset Added Last Indicated Last Indicated By Review Planned Expiration Resolved Resolved By    MRSA 21 Culture, Wound        2021 blood  Foot 4/3/21    VRE 20 Culture, Anaerobic and Aerobic        Foot - 2020    Resolved    C-diff Rule Out  22 Nam Briggs RN   22 Nam Briggs RN    Order     MRSA  17 Ame Alamo RN   20 Ame Alamo

## 2024-01-21 ENCOUNTER — HOSPITAL ENCOUNTER (EMERGENCY)
Facility: CLINIC | Age: 69
Discharge: HOME | End: 2024-01-22
Attending: EMERGENCY MEDICINE
Payer: MEDICARE

## 2024-01-21 DIAGNOSIS — R73.9 HYPERGLYCEMIA: ICD-10-CM

## 2024-01-21 DIAGNOSIS — Z91.199 NONCOMPLIANCE: ICD-10-CM

## 2024-01-21 DIAGNOSIS — G89.29 OTHER CHRONIC PAIN: Primary | ICD-10-CM

## 2024-01-21 LAB
ANION GAP SERPL CALCULATED.3IONS-SCNC: 10 MMOL/L (ref 9–17)
BASOPHILS # BLD: 0.1 K/UL (ref 0–0.2)
BASOPHILS NFR BLD: 1 % (ref 0–2)
BUN SERPL-MCNC: 26 MG/DL (ref 8–23)
CALCIUM SERPL-MCNC: 8.8 MG/DL (ref 8.6–10.4)
CHLORIDE SERPL-SCNC: 105 MMOL/L (ref 98–107)
CO2 SERPL-SCNC: 19 MMOL/L (ref 20–31)
CREAT SERPL-MCNC: 1.1 MG/DL (ref 0.7–1.2)
EOSINOPHIL # BLD: 0.2 K/UL (ref 0–0.4)
EOSINOPHILS RELATIVE PERCENT: 2 % (ref 1–4)
ERYTHROCYTE [DISTWIDTH] IN BLOOD BY AUTOMATED COUNT: 16.8 % (ref 12.5–15.4)
GFR SERPL CREATININE-BSD FRML MDRD: >60 ML/MIN/1.73M2
GLUCOSE BLD-MCNC: 230 MG/DL (ref 75–110)
GLUCOSE BLD-MCNC: 360 MG/DL (ref 75–110)
GLUCOSE SERPL-MCNC: 351 MG/DL (ref 70–99)
HCT VFR BLD AUTO: 39.5 % (ref 41–53)
HGB BLD-MCNC: 12.6 G/DL (ref 13.5–17.5)
LYMPHOCYTES NFR BLD: 1.8 K/UL (ref 1–4.8)
LYMPHOCYTES RELATIVE PERCENT: 16 % (ref 24–44)
MCH RBC QN AUTO: 28.5 PG (ref 26–34)
MCHC RBC AUTO-ENTMCNC: 31.8 G/DL (ref 31–37)
MCV RBC AUTO: 89.6 FL (ref 80–100)
MONOCYTES NFR BLD: 0.8 K/UL (ref 0.1–1.2)
MONOCYTES NFR BLD: 7 % (ref 2–11)
NEUTROPHILS NFR BLD: 74 % (ref 36–66)
NEUTS SEG NFR BLD: 8.1 K/UL (ref 1.8–7.7)
PLATELET # BLD AUTO: 464 K/UL (ref 140–450)
PMV BLD AUTO: 7.3 FL (ref 6–12)
POTASSIUM SERPL-SCNC: 4.3 MMOL/L (ref 3.7–5.3)
RBC # BLD AUTO: 4.41 M/UL (ref 4.5–5.9)
SODIUM SERPL-SCNC: 134 MMOL/L (ref 135–144)
WBC OTHER # BLD: 11.1 K/UL (ref 3.5–11)

## 2024-01-21 PROCEDURE — 99284 EMERGENCY DEPT VISIT MOD MDM: CPT

## 2024-01-21 PROCEDURE — 82947 ASSAY GLUCOSE BLOOD QUANT: CPT

## 2024-01-21 PROCEDURE — 80048 BASIC METABOLIC PNL TOTAL CA: CPT

## 2024-01-21 PROCEDURE — 2580000003 HC RX 258: Performed by: NURSE PRACTITIONER

## 2024-01-21 PROCEDURE — 96374 THER/PROPH/DIAG INJ IV PUSH: CPT

## 2024-01-21 PROCEDURE — 85025 COMPLETE CBC W/AUTO DIFF WBC: CPT

## 2024-01-21 PROCEDURE — 6360000002 HC RX W HCPCS: Performed by: NURSE PRACTITIONER

## 2024-01-21 PROCEDURE — 36415 COLL VENOUS BLD VENIPUNCTURE: CPT

## 2024-01-21 PROCEDURE — 96361 HYDRATE IV INFUSION ADD-ON: CPT

## 2024-01-21 RX ORDER — 0.9 % SODIUM CHLORIDE 0.9 %
1000 INTRAVENOUS SOLUTION INTRAVENOUS ONCE
Status: COMPLETED | OUTPATIENT
Start: 2024-01-21 | End: 2024-01-21

## 2024-01-21 RX ORDER — FAMOTIDINE 20 MG/1
20 TABLET, FILM COATED ORAL ONCE
Status: COMPLETED | OUTPATIENT
Start: 2024-01-21 | End: 2024-01-22

## 2024-01-21 RX ORDER — KETOROLAC TROMETHAMINE 15 MG/ML
15 INJECTION, SOLUTION INTRAMUSCULAR; INTRAVENOUS ONCE
Status: COMPLETED | OUTPATIENT
Start: 2024-01-21 | End: 2024-01-21

## 2024-01-21 RX ADMIN — SODIUM CHLORIDE 1000 ML: 9 INJECTION, SOLUTION INTRAVENOUS at 21:00

## 2024-01-21 RX ADMIN — KETOROLAC TROMETHAMINE 15 MG: 15 INJECTION, SOLUTION INTRAMUSCULAR; INTRAVENOUS at 21:03

## 2024-01-21 ASSESSMENT — PAIN DESCRIPTION - DESCRIPTORS
DESCRIPTORS: ACHING
DESCRIPTORS: ACHING

## 2024-01-21 ASSESSMENT — PAIN - FUNCTIONAL ASSESSMENT: PAIN_FUNCTIONAL_ASSESSMENT: 0-10

## 2024-01-21 ASSESSMENT — PAIN DESCRIPTION - PAIN TYPE: TYPE: ACUTE PAIN

## 2024-01-21 ASSESSMENT — PAIN SCALES - GENERAL
PAINLEVEL_OUTOF10: 10
PAINLEVEL_OUTOF10: 10

## 2024-01-21 ASSESSMENT — PAIN DESCRIPTION - LOCATION
LOCATION: HAND;LEG
LOCATION: HAND

## 2024-01-21 ASSESSMENT — PAIN DESCRIPTION - ORIENTATION
ORIENTATION: RIGHT
ORIENTATION: RIGHT

## 2024-01-22 VITALS
HEART RATE: 89 BPM | RESPIRATION RATE: 17 BRPM | BODY MASS INDEX: 16.04 KG/M2 | HEIGHT: 74 IN | DIASTOLIC BLOOD PRESSURE: 74 MMHG | OXYGEN SATURATION: 95 % | TEMPERATURE: 97.8 F | SYSTOLIC BLOOD PRESSURE: 140 MMHG | WEIGHT: 125 LBS

## 2024-01-22 PROCEDURE — 6370000000 HC RX 637 (ALT 250 FOR IP): Performed by: EMERGENCY MEDICINE

## 2024-01-22 RX ADMIN — FAMOTIDINE 20 MG: 20 TABLET, FILM COATED ORAL at 00:18

## 2024-01-22 ASSESSMENT — PAIN DESCRIPTION - LOCATION: LOCATION: HAND

## 2024-01-22 ASSESSMENT — PAIN - FUNCTIONAL ASSESSMENT: PAIN_FUNCTIONAL_ASSESSMENT: 0-10

## 2024-01-22 ASSESSMENT — PAIN SCALES - GENERAL: PAINLEVEL_OUTOF10: 8

## 2024-01-22 ASSESSMENT — PAIN DESCRIPTION - DESCRIPTORS: DESCRIPTORS: ACHING

## 2024-01-22 ASSESSMENT — PAIN DESCRIPTION - ORIENTATION: ORIENTATION: RIGHT

## 2024-01-22 ASSESSMENT — PAIN DESCRIPTION - PAIN TYPE: TYPE: ACUTE PAIN

## 2024-01-22 NOTE — ED NOTES
After Triaging Patient I advised Nurse Baldomero about the patient. He said he would take the patient. And ultimately Nurse Celena APODACA, took over care of Patient.

## 2024-01-22 NOTE — DISCHARGE INSTRUCTIONS
Please follow up with pain management for your chronic pain.   Please try to comply with medication as best you can, especially your diabetic medications to avoid any diabetic emergency.  Return for any worsening symptoms or concerns.
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
## 2024-01-22 NOTE — ED NOTES
Patient removed from bedpan, no BM. Urinated in bedpan. Situated for comfort in bed. Requesting pain medication called Dilaudid. Requesting to see another doctor rather than Dr. Guardado. Blood sugar rechecked and is trending down after fluids complete

## 2024-01-22 NOTE — ED NOTES
Pt presents to the ED via ambulance. Pt states he has been experiencing right hand pain x8 months. Pt also c/o bilat leg pain, both issues are chronic without acute issue.
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
## 2024-02-23 ENCOUNTER — APPOINTMENT (OUTPATIENT)
Dept: CT IMAGING | Age: 69
End: 2024-02-23
Payer: MEDICARE

## 2024-02-23 ENCOUNTER — HOSPITAL ENCOUNTER (INPATIENT)
Age: 69
LOS: 7 days | Discharge: NURSING, REHAB OR LONG-TERM CARE | End: 2024-03-01
Attending: EMERGENCY MEDICINE | Admitting: INTERNAL MEDICINE
Payer: MEDICARE

## 2024-02-23 ENCOUNTER — APPOINTMENT (OUTPATIENT)
Dept: GENERAL RADIOLOGY | Age: 69
End: 2024-02-23
Payer: MEDICARE

## 2024-02-23 DIAGNOSIS — E86.0 DEHYDRATION: ICD-10-CM

## 2024-02-23 DIAGNOSIS — L97.921 LEG ULCER, LEFT, LIMITED TO BREAKDOWN OF SKIN (HCC): ICD-10-CM

## 2024-02-23 DIAGNOSIS — R62.7 FAILURE TO THRIVE IN ADULT: ICD-10-CM

## 2024-02-23 DIAGNOSIS — S81.802S WOUND OF LEFT LOWER EXTREMITY, SEQUELA: ICD-10-CM

## 2024-02-23 DIAGNOSIS — K52.9 CHRONIC DIARRHEA: ICD-10-CM

## 2024-02-23 DIAGNOSIS — R73.9 HYPERGLYCEMIA: Primary | ICD-10-CM

## 2024-02-23 PROBLEM — S81.802A WOUND OF LEFT LOWER EXTREMITY: Status: ACTIVE | Noted: 2024-02-23

## 2024-02-23 LAB
AMORPH SED URNS QL MICRO: ABNORMAL
ANION GAP SERPL CALCULATED.3IONS-SCNC: 9 MMOL/L (ref 9–17)
BACTERIA URNS QL MICRO: ABNORMAL
BASOPHILS # BLD: 0.27 K/UL (ref 0–0.2)
BASOPHILS NFR BLD: 1 % (ref 0–2)
BILIRUB UR QL STRIP: NEGATIVE
BUN SERPL-MCNC: 16 MG/DL (ref 8–23)
BUN/CREAT SERPL: 16 (ref 9–20)
CALCIUM SERPL-MCNC: 9 MG/DL (ref 8.6–10.4)
CASTS #/AREA URNS LPF: ABNORMAL /LPF
CHLORIDE SERPL-SCNC: 103 MMOL/L (ref 98–107)
CLARITY UR: CLEAR
CO2 SERPL-SCNC: 19 MMOL/L (ref 20–31)
COLOR UR: YELLOW
CREAT SERPL-MCNC: 1 MG/DL (ref 0.7–1.2)
CRYSTALS URNS MICRO: ABNORMAL /HPF
EOSINOPHIL # BLD: 0 K/UL (ref 0–0.44)
EOSINOPHILS RELATIVE PERCENT: 0 % (ref 1–4)
EPI CELLS #/AREA URNS HPF: ABNORMAL /HPF (ref 0–5)
ERYTHROCYTE [DISTWIDTH] IN BLOOD BY AUTOMATED COUNT: 14.4 % (ref 11.8–14.4)
GFR SERPL CREATININE-BSD FRML MDRD: >60 ML/MIN/1.73M2
GLUCOSE BLD-MCNC: 186 MG/DL (ref 75–110)
GLUCOSE BLD-MCNC: 242 MG/DL (ref 75–110)
GLUCOSE BLD-MCNC: 263 MG/DL (ref 75–110)
GLUCOSE SERPL-MCNC: 325 MG/DL (ref 70–99)
GLUCOSE UR STRIP-MCNC: ABNORMAL MG/DL
HCT VFR BLD AUTO: 40.1 % (ref 40.7–50.3)
HGB BLD-MCNC: 12.6 G/DL (ref 13–17)
HGB UR QL STRIP.AUTO: ABNORMAL
IMM GRANULOCYTES # BLD AUTO: 0.27 K/UL (ref 0–0.3)
IMM GRANULOCYTES NFR BLD: 1 %
KETONES UR STRIP-MCNC: NEGATIVE MG/DL
LACTATE BLDV-SCNC: 0.8 MMOL/L (ref 0.5–2.2)
LACTATE BLDV-SCNC: 1.3 MMOL/L (ref 0.5–2.2)
LEUKOCYTE ESTERASE UR QL STRIP: NEGATIVE
LYMPHOCYTES NFR BLD: 1.63 K/UL (ref 1.1–3.7)
LYMPHOCYTES RELATIVE PERCENT: 6 % (ref 24–43)
MCH RBC QN AUTO: 27.8 PG (ref 25.2–33.5)
MCHC RBC AUTO-ENTMCNC: 31.4 G/DL (ref 28.4–34.8)
MCV RBC AUTO: 88.3 FL (ref 82.6–102.9)
MONOCYTES NFR BLD: 1.9 K/UL (ref 0.1–1.2)
MONOCYTES NFR BLD: 7 % (ref 3–12)
NEUTROPHILS NFR BLD: 85 % (ref 36–65)
NEUTS SEG NFR BLD: 23.03 K/UL (ref 1.5–8.1)
NITRITE UR QL STRIP: NEGATIVE
NRBC BLD-RTO: 0 PER 100 WBC
PH UR STRIP: 5 [PH] (ref 5–8)
PLATELET # BLD AUTO: 629 K/UL (ref 138–453)
PMV BLD AUTO: 9.3 FL (ref 8.1–13.5)
POTASSIUM SERPL-SCNC: 3.7 MMOL/L (ref 3.7–5.3)
PROT UR STRIP-MCNC: ABNORMAL MG/DL
RBC # BLD AUTO: 4.54 M/UL (ref 4.21–5.77)
RBC #/AREA URNS HPF: ABNORMAL /HPF (ref 0–2)
SODIUM SERPL-SCNC: 131 MMOL/L (ref 135–144)
SP GR UR STRIP: 1.03 (ref 1–1.03)
UROBILINOGEN UR STRIP-ACNC: NORMAL EU/DL (ref 0–1)
WBC #/AREA URNS HPF: ABNORMAL /HPF (ref 0–5)
WBC OTHER # BLD: 27.1 K/UL (ref 3.5–11.3)

## 2024-02-23 PROCEDURE — 1200000000 HC SEMI PRIVATE

## 2024-02-23 PROCEDURE — 85025 COMPLETE CBC W/AUTO DIFF WBC: CPT

## 2024-02-23 PROCEDURE — 99285 EMERGENCY DEPT VISIT HI MDM: CPT

## 2024-02-23 PROCEDURE — 83605 ASSAY OF LACTIC ACID: CPT

## 2024-02-23 PROCEDURE — 96361 HYDRATE IV INFUSION ADD-ON: CPT

## 2024-02-23 PROCEDURE — 74176 CT ABD & PELVIS W/O CONTRAST: CPT

## 2024-02-23 PROCEDURE — 2580000003 HC RX 258: Performed by: NURSE PRACTITIONER

## 2024-02-23 PROCEDURE — 81001 URINALYSIS AUTO W/SCOPE: CPT

## 2024-02-23 PROCEDURE — 96376 TX/PRO/DX INJ SAME DRUG ADON: CPT

## 2024-02-23 PROCEDURE — 6360000002 HC RX W HCPCS: Performed by: NURSE PRACTITIONER

## 2024-02-23 PROCEDURE — 80048 BASIC METABOLIC PNL TOTAL CA: CPT

## 2024-02-23 PROCEDURE — 6370000000 HC RX 637 (ALT 250 FOR IP): Performed by: NURSE PRACTITIONER

## 2024-02-23 PROCEDURE — 99222 1ST HOSP IP/OBS MODERATE 55: CPT | Performed by: NURSE PRACTITIONER

## 2024-02-23 PROCEDURE — 82947 ASSAY GLUCOSE BLOOD QUANT: CPT

## 2024-02-23 PROCEDURE — 94640 AIRWAY INHALATION TREATMENT: CPT

## 2024-02-23 PROCEDURE — 2580000003 HC RX 258: Performed by: EMERGENCY MEDICINE

## 2024-02-23 PROCEDURE — 36415 COLL VENOUS BLD VENIPUNCTURE: CPT

## 2024-02-23 PROCEDURE — 96374 THER/PROPH/DIAG INJ IV PUSH: CPT

## 2024-02-23 PROCEDURE — 6360000002 HC RX W HCPCS: Performed by: EMERGENCY MEDICINE

## 2024-02-23 PROCEDURE — 73552 X-RAY EXAM OF FEMUR 2/>: CPT

## 2024-02-23 PROCEDURE — 94761 N-INVAS EAR/PLS OXIMETRY MLT: CPT

## 2024-02-23 PROCEDURE — 87040 BLOOD CULTURE FOR BACTERIA: CPT

## 2024-02-23 RX ORDER — ACETAMINOPHEN 325 MG/1
650 TABLET ORAL EVERY 6 HOURS PRN
Status: DISCONTINUED | OUTPATIENT
Start: 2024-02-23 | End: 2024-03-01 | Stop reason: HOSPADM

## 2024-02-23 RX ORDER — POLYETHYLENE GLYCOL 3350 17 G/17G
17 POWDER, FOR SOLUTION ORAL DAILY PRN
Status: DISCONTINUED | OUTPATIENT
Start: 2024-02-23 | End: 2024-03-01 | Stop reason: HOSPADM

## 2024-02-23 RX ORDER — POTASSIUM CHLORIDE 20 MEQ/1
40 TABLET, EXTENDED RELEASE ORAL PRN
Status: DISCONTINUED | OUTPATIENT
Start: 2024-02-23 | End: 2024-03-01 | Stop reason: HOSPADM

## 2024-02-23 RX ORDER — INSULIN LISPRO 100 [IU]/ML
0-16 INJECTION, SOLUTION INTRAVENOUS; SUBCUTANEOUS
Status: DISCONTINUED | OUTPATIENT
Start: 2024-02-23 | End: 2024-03-01 | Stop reason: HOSPADM

## 2024-02-23 RX ORDER — DEXTROSE MONOHYDRATE 100 MG/ML
INJECTION, SOLUTION INTRAVENOUS CONTINUOUS PRN
Status: DISCONTINUED | OUTPATIENT
Start: 2024-02-23 | End: 2024-03-01 | Stop reason: HOSPADM

## 2024-02-23 RX ORDER — ASPIRIN 81 MG/1
81 TABLET ORAL DAILY
Status: DISCONTINUED | OUTPATIENT
Start: 2024-02-24 | End: 2024-03-01 | Stop reason: HOSPADM

## 2024-02-23 RX ORDER — BUDESONIDE AND FORMOTEROL FUMARATE DIHYDRATE 160; 4.5 UG/1; UG/1
2 AEROSOL RESPIRATORY (INHALATION)
Status: DISCONTINUED | OUTPATIENT
Start: 2024-02-23 | End: 2024-03-01 | Stop reason: HOSPADM

## 2024-02-23 RX ORDER — INSULIN LISPRO 100 [IU]/ML
0-4 INJECTION, SOLUTION INTRAVENOUS; SUBCUTANEOUS NIGHTLY
Status: DISCONTINUED | OUTPATIENT
Start: 2024-02-23 | End: 2024-03-01 | Stop reason: HOSPADM

## 2024-02-23 RX ORDER — MORPHINE SULFATE 4 MG/ML
4 INJECTION, SOLUTION INTRAMUSCULAR; INTRAVENOUS ONCE
Status: COMPLETED | OUTPATIENT
Start: 2024-02-23 | End: 2024-02-23

## 2024-02-23 RX ORDER — SODIUM CHLORIDE 0.9 % (FLUSH) 0.9 %
10 SYRINGE (ML) INJECTION PRN
Status: DISCONTINUED | OUTPATIENT
Start: 2024-02-23 | End: 2024-03-01 | Stop reason: HOSPADM

## 2024-02-23 RX ORDER — ONDANSETRON 2 MG/ML
4 INJECTION INTRAMUSCULAR; INTRAVENOUS EVERY 6 HOURS PRN
Status: DISCONTINUED | OUTPATIENT
Start: 2024-02-23 | End: 2024-03-01 | Stop reason: HOSPADM

## 2024-02-23 RX ORDER — LANOLIN ALCOHOL/MO/W.PET/CERES
3 CREAM (GRAM) TOPICAL ONCE
Status: COMPLETED | OUTPATIENT
Start: 2024-02-23 | End: 2024-02-23

## 2024-02-23 RX ORDER — SODIUM CHLORIDE 9 MG/ML
INJECTION, SOLUTION INTRAVENOUS CONTINUOUS
Status: DISCONTINUED | OUTPATIENT
Start: 2024-02-23 | End: 2024-03-01 | Stop reason: HOSPADM

## 2024-02-23 RX ORDER — OXYBUTYNIN CHLORIDE 10 MG/1
10 TABLET, EXTENDED RELEASE ORAL DAILY
Status: DISCONTINUED | OUTPATIENT
Start: 2024-02-23 | End: 2024-03-01 | Stop reason: HOSPADM

## 2024-02-23 RX ORDER — MAGNESIUM SULFATE 1 G/100ML
1000 INJECTION INTRAVENOUS PRN
Status: DISCONTINUED | OUTPATIENT
Start: 2024-02-23 | End: 2024-03-01 | Stop reason: HOSPADM

## 2024-02-23 RX ORDER — TAMSULOSIN HYDROCHLORIDE 0.4 MG/1
0.4 CAPSULE ORAL EVERY MORNING
Status: DISCONTINUED | OUTPATIENT
Start: 2024-02-24 | End: 2024-03-01 | Stop reason: HOSPADM

## 2024-02-23 RX ORDER — HYDROXYZINE HYDROCHLORIDE 25 MG/1
25 TABLET, FILM COATED ORAL EVERY 8 HOURS PRN
Status: DISCONTINUED | OUTPATIENT
Start: 2024-02-23 | End: 2024-03-01 | Stop reason: HOSPADM

## 2024-02-23 RX ORDER — ATORVASTATIN CALCIUM 10 MG/1
40 TABLET, FILM COATED ORAL NIGHTLY
Status: DISCONTINUED | OUTPATIENT
Start: 2024-02-23 | End: 2024-03-01 | Stop reason: HOSPADM

## 2024-02-23 RX ORDER — 0.9 % SODIUM CHLORIDE 0.9 %
1000 INTRAVENOUS SOLUTION INTRAVENOUS ONCE
Status: COMPLETED | OUTPATIENT
Start: 2024-02-23 | End: 2024-02-23

## 2024-02-23 RX ORDER — FENTANYL CITRATE 0.05 MG/ML
25 INJECTION, SOLUTION INTRAMUSCULAR; INTRAVENOUS
Status: DISCONTINUED | OUTPATIENT
Start: 2024-02-23 | End: 2024-02-23

## 2024-02-23 RX ORDER — PROCHLORPERAZINE EDISYLATE 5 MG/ML
10 INJECTION INTRAMUSCULAR; INTRAVENOUS EVERY 6 HOURS PRN
Status: DISCONTINUED | OUTPATIENT
Start: 2024-02-23 | End: 2024-03-01 | Stop reason: HOSPADM

## 2024-02-23 RX ORDER — HYDROCODONE BITARTRATE AND ACETAMINOPHEN 5; 325 MG/1; MG/1
1 TABLET ORAL EVERY 4 HOURS PRN
Status: DISCONTINUED | OUTPATIENT
Start: 2024-02-23 | End: 2024-03-01 | Stop reason: HOSPADM

## 2024-02-23 RX ORDER — ENOXAPARIN SODIUM 100 MG/ML
30 INJECTION SUBCUTANEOUS DAILY
Status: DISCONTINUED | OUTPATIENT
Start: 2024-02-23 | End: 2024-02-27

## 2024-02-23 RX ORDER — SODIUM CHLORIDE 9 MG/ML
INJECTION, SOLUTION INTRAVENOUS PRN
Status: DISCONTINUED | OUTPATIENT
Start: 2024-02-23 | End: 2024-03-01 | Stop reason: HOSPADM

## 2024-02-23 RX ORDER — ONDANSETRON 4 MG/1
4 TABLET, ORALLY DISINTEGRATING ORAL EVERY 8 HOURS PRN
Status: DISCONTINUED | OUTPATIENT
Start: 2024-02-23 | End: 2024-03-01 | Stop reason: HOSPADM

## 2024-02-23 RX ORDER — ACETAMINOPHEN 650 MG/1
650 SUPPOSITORY RECTAL EVERY 6 HOURS PRN
Status: DISCONTINUED | OUTPATIENT
Start: 2024-02-23 | End: 2024-03-01 | Stop reason: HOSPADM

## 2024-02-23 RX ORDER — SODIUM CHLORIDE 0.9 % (FLUSH) 0.9 %
5-40 SYRINGE (ML) INJECTION EVERY 12 HOURS SCHEDULED
Status: DISCONTINUED | OUTPATIENT
Start: 2024-02-23 | End: 2024-03-01 | Stop reason: HOSPADM

## 2024-02-23 RX ORDER — HYDROCODONE BITARTRATE AND ACETAMINOPHEN 5; 325 MG/1; MG/1
2 TABLET ORAL EVERY 4 HOURS PRN
Status: DISCONTINUED | OUTPATIENT
Start: 2024-02-23 | End: 2024-03-01 | Stop reason: HOSPADM

## 2024-02-23 RX ORDER — FAMOTIDINE 20 MG/1
20 TABLET, FILM COATED ORAL 2 TIMES DAILY
Status: DISCONTINUED | OUTPATIENT
Start: 2024-02-23 | End: 2024-03-01 | Stop reason: HOSPADM

## 2024-02-23 RX ORDER — POTASSIUM CHLORIDE 7.45 MG/ML
10 INJECTION INTRAVENOUS PRN
Status: DISCONTINUED | OUTPATIENT
Start: 2024-02-23 | End: 2024-03-01 | Stop reason: HOSPADM

## 2024-02-23 RX ORDER — INSULIN GLARGINE 100 [IU]/ML
12 INJECTION, SOLUTION SUBCUTANEOUS
Status: DISCONTINUED | OUTPATIENT
Start: 2024-02-23 | End: 2024-03-01 | Stop reason: HOSPADM

## 2024-02-23 RX ORDER — LANOLIN ALCOHOL/MO/W.PET/CERES
325 CREAM (GRAM) TOPICAL
Status: DISCONTINUED | OUTPATIENT
Start: 2024-02-24 | End: 2024-03-01 | Stop reason: HOSPADM

## 2024-02-23 RX ORDER — FENTANYL CITRATE 0.05 MG/ML
50 INJECTION, SOLUTION INTRAMUSCULAR; INTRAVENOUS
Status: DISCONTINUED | OUTPATIENT
Start: 2024-02-23 | End: 2024-02-23

## 2024-02-23 RX ORDER — ALBUTEROL SULFATE 90 UG/1
2 AEROSOL, METERED RESPIRATORY (INHALATION) EVERY 6 HOURS PRN
Status: DISCONTINUED | OUTPATIENT
Start: 2024-02-23 | End: 2024-03-01 | Stop reason: HOSPADM

## 2024-02-23 RX ORDER — MORPHINE SULFATE 2 MG/ML
2 INJECTION, SOLUTION INTRAMUSCULAR; INTRAVENOUS ONCE
Status: COMPLETED | OUTPATIENT
Start: 2024-02-23 | End: 2024-02-23

## 2024-02-23 RX ORDER — SCOLOPAMINE TRANSDERMAL SYSTEM 1 MG/1
1 PATCH, EXTENDED RELEASE TRANSDERMAL
Status: DISCONTINUED | OUTPATIENT
Start: 2024-02-23 | End: 2024-03-01 | Stop reason: HOSPADM

## 2024-02-23 RX ADMIN — CEFEPIME 2000 MG: 2 INJECTION, POWDER, FOR SOLUTION INTRAVENOUS at 20:02

## 2024-02-23 RX ADMIN — MORPHINE SULFATE 4 MG: 4 INJECTION, SOLUTION INTRAMUSCULAR; INTRAVENOUS at 15:17

## 2024-02-23 RX ADMIN — INSULIN LISPRO 4 UNITS: 100 INJECTION, SOLUTION INTRAVENOUS; SUBCUTANEOUS at 20:05

## 2024-02-23 RX ADMIN — Medication 3 MG: at 21:05

## 2024-02-23 RX ADMIN — ATORVASTATIN CALCIUM 40 MG: 10 TABLET, FILM COATED ORAL at 19:56

## 2024-02-23 RX ADMIN — MORPHINE SULFATE 2 MG: 2 INJECTION, SOLUTION INTRAMUSCULAR; INTRAVENOUS at 21:05

## 2024-02-23 RX ADMIN — METOPROLOL TARTRATE 25 MG: 25 TABLET, FILM COATED ORAL at 19:56

## 2024-02-23 RX ADMIN — MORPHINE SULFATE 4 MG: 4 INJECTION, SOLUTION INTRAMUSCULAR; INTRAVENOUS at 17:39

## 2024-02-23 RX ADMIN — FAMOTIDINE 20 MG: 20 TABLET, FILM COATED ORAL at 21:56

## 2024-02-23 RX ADMIN — HYDROXYZINE HYDROCHLORIDE 25 MG: 25 TABLET, FILM COATED ORAL at 21:06

## 2024-02-23 RX ADMIN — OXYBUTYNIN CHLORIDE 10 MG: 10 TABLET, EXTENDED RELEASE ORAL at 20:05

## 2024-02-23 RX ADMIN — SODIUM CHLORIDE 1000 ML: 9 INJECTION, SOLUTION INTRAVENOUS at 13:48

## 2024-02-23 RX ADMIN — BUDESONIDE AND FORMOTEROL FUMARATE DIHYDRATE 2 PUFF: 160; 4.5 AEROSOL RESPIRATORY (INHALATION) at 20:52

## 2024-02-23 RX ADMIN — ENOXAPARIN SODIUM 30 MG: 100 INJECTION SUBCUTANEOUS at 20:04

## 2024-02-23 RX ADMIN — PROCHLORPERAZINE EDISYLATE 10 MG: 5 INJECTION INTRAMUSCULAR; INTRAVENOUS at 21:03

## 2024-02-23 RX ADMIN — INSULIN GLARGINE 12 UNITS: 100 INJECTION, SOLUTION SUBCUTANEOUS at 20:04

## 2024-02-23 RX ADMIN — VANCOMYCIN HYDROCHLORIDE 1250 MG: 5 INJECTION, POWDER, LYOPHILIZED, FOR SOLUTION INTRAVENOUS at 21:09

## 2024-02-23 RX ADMIN — HYDROCODONE BITARTRATE AND ACETAMINOPHEN 2 TABLET: 5; 325 TABLET ORAL at 19:54

## 2024-02-23 RX ADMIN — MORPHINE SULFATE 4 MG: 4 INJECTION, SOLUTION INTRAMUSCULAR; INTRAVENOUS at 13:48

## 2024-02-23 RX ADMIN — PIPERACILLIN AND TAZOBACTAM 4500 MG: 4; .5 INJECTION, POWDER, FOR SOLUTION INTRAVENOUS at 17:09

## 2024-02-23 RX ADMIN — SODIUM CHLORIDE: 9 INJECTION, SOLUTION INTRAVENOUS at 19:41

## 2024-02-23 RX ADMIN — SODIUM CHLORIDE 1000 ML: 9 INJECTION, SOLUTION INTRAVENOUS at 17:08

## 2024-02-23 ASSESSMENT — PAIN DESCRIPTION - ORIENTATION: ORIENTATION: RIGHT;LEFT

## 2024-02-23 ASSESSMENT — PAIN SCALES - GENERAL
PAINLEVEL_OUTOF10: 6
PAINLEVEL_OUTOF10: 2
PAINLEVEL_OUTOF10: 10
PAINLEVEL_OUTOF10: 7
PAINLEVEL_OUTOF10: 7
PAINLEVEL_OUTOF10: 10
PAINLEVEL_OUTOF10: 10

## 2024-02-23 ASSESSMENT — PAIN - FUNCTIONAL ASSESSMENT: PAIN_FUNCTIONAL_ASSESSMENT: 0-10

## 2024-02-23 ASSESSMENT — PAIN DESCRIPTION - LOCATION: LOCATION: LEG

## 2024-02-23 NOTE — H&P
[facility]  Office: [phone]  Baldomero Maldonado DO, Luis Valdivia DO, Eric Rojas DO, Rajinder Chiu DO, Erica Daugherty MD, Carolina Hernandez MD, Haleigh Sorto MD, Hannah Blum MD,  Isael Velazco MD, Cora Coughlin MD, Alex Ferrell MD,  Chela Doan DO, Trent Kearns MD, Manolo Potter MD, Hans Maldonado DO, Mei Tolbert MD,  Gianni De La Rosa DO, Aranza Wakefield MD, Magdalena Dill MD, Mary Ann Cheek MD, Jenny Williamson MD,  Panfilo Hall MD, Dorcas Gillis MD, Nato Gunter MD, Opal Davis MD, Red Small MD, Giana Larsen MD, Roberth Eason DO, Carmelo Conroy DO, Marj Davis MD,  Joe Franklin MD, Shirley Waterhouse, CNP,  Xena Harrell, CNP, Trenton Campbell, CNP,  Celena Corona, DNP, Terrie Mathur, CNP, Dalia Lopes, CNP, Kelin Hernandez CNP, Chiquita Manuel, CNP, Anh Kolb, CNP, Geetha Gold, PA-C, Rubina Miller, PA-C, Gris Thorpe, CNP, Paige Higgins, CNP, Elizabeth Clemente, CNP, Lea Fletcher, CNS, Maria Esther Silverio, CNP, Norma Soliman, CNP, Tracy Schwab, CNP         [facility]   IN-PATIENT SERVICE   [facility]    HISTORY AND PHYSICAL EXAMINATION            Date:   2/23/2024  Patient name:  Nicolas Cardenas  Date of admission:  2/23/2024 12:56 PM  MRN:   [id number]  Account:  [id number]  YOB: 1955  PCP:    Jono Vargas APRN - NP  Room:   [place] [address]  Code Status:    Full    Chief Complaint:     Chief Complaint   Patient presents with    Wound Infection     Wounds to bilat stumps     History Obtained From:     patient, electronic medical record    History of Present Illness:     Patient presents to the ED with bilateral wound to his bilateral BKAs. Patient has a past medical history of bilateral BKAs, CKD, COPD, diabetes, GERD, hyperlipidemia, hypertension, seizures and hypothyroidism. Patient states that he has been dealing with diarrhea for the past 2 years. He states that any time he eats, he then has to have a bowel          General: No swelling. Normal range of motion.      Cervical back: Normal range of motion.      Right Lower Extremity: Right leg is amputated below knee.      Left Lower Extremity: Left leg is amputated below knee.   Skin:     General: Skin is warm and dry.      Capillary Refill: Capillary refill takes less than 2 seconds.      Findings: Wound (see photos) present.   Neurological:      Mental Status: He is alert and oriented to person, place, and time. Mental status is at baseline.   Psychiatric:         Mood and Affect: Mood normal.         Behavior: Behavior normal.         Thought Content: Thought content normal.         Judgment: Judgment normal.               Investigations:      Laboratory Testing:  Recent Results (from the past 24 hour(s))   BMP    Collection Time: 02/23/24  1:45 PM   Result Value Ref Range    Sodium 131 (L) 135 - 144 mmol/L    Potassium 3.7 3.7 - 5.3 mmol/L    Chloride 103 98 - 107 mmol/L    CO2 19 (L) 20 - 31 mmol/L    Anion Gap 9 9 - 17 mmol/L    Glucose 325 (H) 70 - 99 mg/dL    BUN 16 8 - 23 mg/dL    Creatinine 1.0 0.7 - 1.2 mg/dL    Est, Glom Filt Rate >60 >60 mL/min/1.73m2    Bun/Cre Ratio 16 9 - 20    Calcium 9.0 8.6 - 10.4 mg/dL   CBC with Auto Differential    Collection Time: 02/23/24  1:45 PM   Result Value Ref Range    WBC 27.1 (H) 3.5 - 11.3 k/uL    RBC 4.54 4.21 - 5.77 m/uL    Hemoglobin 12.6 (L) 13.0 - 17.0 g/dL    Hematocrit 40.1 (L) 40.7 - 50.3 %    MCV 88.3 82.6 - 102.9 fL    MCH 27.8 25.2 - 33.5 pg    MCHC 31.4 28.4 - 34.8 g/dL    RDW 14.4 11.8 - 14.4 %    Platelets 629 (H) 138 - 453 k/uL    MPV 9.3 8.1 - 13.5 fL    NRBC Automated 0.0 0.0 per 100 WBC    Neutrophils % 85 (H) 36 - 65 %    Lymphocytes % 6 (L) 24 - 43 %    Monocytes % 7 3 - 12 %    Eosinophils % 0 (L) 1 - 4 %    Basophils % 1 0 - 2 %    Immature Granulocytes 1 (H) 0 %    Neutrophils Absolute 23.03 (H) 1.50 - 8.10 k/uL    Lymphocytes Absolute 1.63 1.10 - 3.70 k/uL    Monocytes Absolute 1.90 (H) 0.10 - 1.20

## 2024-02-23 NOTE — CARE COORDINATION
Case Management Assessment  Initial Evaluation    Date/Time of Evaluation: 2/23/2024 5:43 PM  Assessment Completed by: HUNTER Garcia    If patient is discharged prior to next notation, then this note serves as note for discharge by case management.    Patient Name: Nicolas Cardenas                   YOB: 1955  Diagnosis: Adult failure to thrive [R62.7]                   Date / Time: 2/23/2024 12:56 PM    Patient Admission Status: Inpatient   Readmission Risk (Low < 19, Mod (19-27), High > 27): No data recorded  Current PCP: Jono Vargas APRN - NP  PCP verified by CM? Yes    Chart Reviewed: Yes      History Provided by: Patient  Patient Orientation: Alert and Oriented    Patient Cognition: Alert    Hospitalization in the last 30 days (Readmission):  No    If yes, Readmission Assessment in  Navigator will be completed.    Advance Directives:      Code Status: Prior   Patient's Primary Decision Maker is: Legal Next of Kin    Primary Decision Maker: Marisela Ramirez - Child - [phone]    Discharge Planning:    Patient lives with: Alone Type of Home: House  Primary Care Giver: Self  Patient Support Systems include: Children, Friends/Neighbors   Current Financial resources: Medicare  Current community resources: None  Current services prior to admission: Durable Medical Equipment, Home Care (does not remember [facility] agency name)            Current DME: Wheelchair, Shower Chair            Type of Home Care services:  Nursing Services, Aide Services    ADLS  Prior functional level: Assistance with the following:, Shopping, Housework, Cooking  Current functional level: Housework, Shopping, Cooking, Assistance with the following:    PT AM-PAC:   /24  OT AM-PAC:   /24    Family can provide assistance at DC: No  Would you like Case Management to discuss the discharge plan with any other family members/significant others, and if so, who? No  Plans to Return to Present Housing: Unknown at
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
## 2024-02-23 NOTE — PROGRESS NOTES
Pt admitted to room 2012.   Oriented to room, call light and bed mechanics. Side rails up x2. Call light within reach. Orders reviewed.

## 2024-02-23 NOTE — ED NOTES
Pt arrives via EMS with c/o wounds to BLE stumps - pt is bilat BKA amputee. States wounds are from his ill-fitting prostethics. Reports onset of wounds approx 2 weeks ago and was given PO atbx approx one week ago which \"I took for a couple days\" but d/t narrowed esophagus, pt states he has difficulty swallowing pills sometimes so he stopped taking the atbx - states home health is unaware.    No

## 2024-02-24 PROBLEM — E86.0 DEHYDRATION: Status: ACTIVE | Noted: 2024-02-24

## 2024-02-24 PROBLEM — L97.921 LEG ULCER, LEFT, LIMITED TO BREAKDOWN OF SKIN (HCC): Status: ACTIVE | Noted: 2024-02-24

## 2024-02-24 PROBLEM — K52.9 CHRONIC DIARRHEA: Status: ACTIVE | Noted: 2024-02-24

## 2024-02-24 LAB
ANION GAP SERPL CALCULATED.3IONS-SCNC: 8 MMOL/L (ref 9–17)
BASOPHILS # BLD: 0.23 K/UL (ref 0–0.2)
BASOPHILS NFR BLD: 1 % (ref 0–2)
BUN SERPL-MCNC: 14 MG/DL (ref 8–23)
BUN/CREAT SERPL: 14 (ref 9–20)
CALCIUM SERPL-MCNC: 8.5 MG/DL (ref 8.6–10.4)
CHLORIDE SERPL-SCNC: 109 MMOL/L (ref 98–107)
CO2 SERPL-SCNC: 19 MMOL/L (ref 20–31)
CREAT SERPL-MCNC: 1 MG/DL (ref 0.7–1.2)
EOSINOPHIL # BLD: 0.23 K/UL (ref 0–0.44)
EOSINOPHILS RELATIVE PERCENT: 1 % (ref 1–4)
ERYTHROCYTE [DISTWIDTH] IN BLOOD BY AUTOMATED COUNT: 14.5 % (ref 11.8–14.4)
GFR SERPL CREATININE-BSD FRML MDRD: >60 ML/MIN/1.73M2
GLUCOSE BLD-MCNC: 143 MG/DL (ref 75–110)
GLUCOSE BLD-MCNC: 185 MG/DL (ref 75–110)
GLUCOSE BLD-MCNC: 269 MG/DL (ref 75–110)
GLUCOSE BLD-MCNC: 317 MG/DL (ref 75–110)
GLUCOSE SERPL-MCNC: 144 MG/DL (ref 70–99)
HCT VFR BLD AUTO: 33.7 % (ref 40.7–50.3)
HGB BLD-MCNC: 10.6 G/DL (ref 13–17)
IMM GRANULOCYTES # BLD AUTO: 0.23 K/UL (ref 0–0.3)
IMM GRANULOCYTES NFR BLD: 1 %
LYMPHOCYTES NFR BLD: 1.82 K/UL (ref 1.1–3.7)
LYMPHOCYTES RELATIVE PERCENT: 8 % (ref 24–43)
MCH RBC QN AUTO: 28.1 PG (ref 25.2–33.5)
MCHC RBC AUTO-ENTMCNC: 31.5 G/DL (ref 28.4–34.8)
MCV RBC AUTO: 89.4 FL (ref 82.6–102.9)
MONOCYTES NFR BLD: 1.6 K/UL (ref 0.1–1.2)
MONOCYTES NFR BLD: 7 % (ref 3–12)
NEUTROPHILS NFR BLD: 82 % (ref 36–65)
NEUTS SEG NFR BLD: 18.69 K/UL (ref 1.5–8.1)
NRBC BLD-RTO: 0 PER 100 WBC
PLATELET # BLD AUTO: 511 K/UL (ref 138–453)
PMV BLD AUTO: 8.9 FL (ref 8.1–13.5)
POTASSIUM SERPL-SCNC: 3.7 MMOL/L (ref 3.7–5.3)
RBC # BLD AUTO: 3.77 M/UL (ref 4.21–5.77)
SODIUM SERPL-SCNC: 136 MMOL/L (ref 135–144)
WBC OTHER # BLD: 22.8 K/UL (ref 3.5–11.3)

## 2024-02-24 PROCEDURE — 80048 BASIC METABOLIC PNL TOTAL CA: CPT

## 2024-02-24 PROCEDURE — 99232 SBSQ HOSP IP/OBS MODERATE 35: CPT | Performed by: NURSE PRACTITIONER

## 2024-02-24 PROCEDURE — 6360000002 HC RX W HCPCS: Performed by: NURSE PRACTITIONER

## 2024-02-24 PROCEDURE — 2580000003 HC RX 258: Performed by: NURSE PRACTITIONER

## 2024-02-24 PROCEDURE — 94640 AIRWAY INHALATION TREATMENT: CPT

## 2024-02-24 PROCEDURE — 82947 ASSAY GLUCOSE BLOOD QUANT: CPT

## 2024-02-24 PROCEDURE — 87449 NOS EACH ORGANISM AG IA: CPT

## 2024-02-24 PROCEDURE — 87506 IADNA-DNA/RNA PROBE TQ 6-11: CPT

## 2024-02-24 PROCEDURE — 99223 1ST HOSP IP/OBS HIGH 75: CPT | Performed by: INTERNAL MEDICINE

## 2024-02-24 PROCEDURE — 87324 CLOSTRIDIUM AG IA: CPT

## 2024-02-24 PROCEDURE — 36415 COLL VENOUS BLD VENIPUNCTURE: CPT

## 2024-02-24 PROCEDURE — 1200000000 HC SEMI PRIVATE

## 2024-02-24 PROCEDURE — 85025 COMPLETE CBC W/AUTO DIFF WBC: CPT

## 2024-02-24 PROCEDURE — 6370000000 HC RX 637 (ALT 250 FOR IP): Performed by: NURSE PRACTITIONER

## 2024-02-24 RX ORDER — MORPHINE SULFATE 2 MG/ML
2 INJECTION, SOLUTION INTRAMUSCULAR; INTRAVENOUS
Status: DISCONTINUED | OUTPATIENT
Start: 2024-02-24 | End: 2024-02-26

## 2024-02-24 RX ADMIN — SODIUM CHLORIDE, PRESERVATIVE FREE 10 ML: 5 INJECTION INTRAVENOUS at 09:36

## 2024-02-24 RX ADMIN — MORPHINE SULFATE 2 MG: 2 INJECTION, SOLUTION INTRAMUSCULAR; INTRAVENOUS at 05:16

## 2024-02-24 RX ADMIN — HYDROCODONE BITARTRATE AND ACETAMINOPHEN 2 TABLET: 5; 325 TABLET ORAL at 23:06

## 2024-02-24 RX ADMIN — CEFEPIME 2000 MG: 2 INJECTION, POWDER, FOR SOLUTION INTRAVENOUS at 18:14

## 2024-02-24 RX ADMIN — BUDESONIDE AND FORMOTEROL FUMARATE DIHYDRATE 2 PUFF: 160; 4.5 AEROSOL RESPIRATORY (INHALATION) at 07:18

## 2024-02-24 RX ADMIN — OXYBUTYNIN CHLORIDE 10 MG: 10 TABLET, EXTENDED RELEASE ORAL at 09:35

## 2024-02-24 RX ADMIN — INSULIN GLARGINE 12 UNITS: 100 INJECTION, SOLUTION SUBCUTANEOUS at 16:58

## 2024-02-24 RX ADMIN — FAMOTIDINE 20 MG: 20 TABLET, FILM COATED ORAL at 09:35

## 2024-02-24 RX ADMIN — METOPROLOL TARTRATE 25 MG: 25 TABLET, FILM COATED ORAL at 09:35

## 2024-02-24 RX ADMIN — HYDROCODONE BITARTRATE AND ACETAMINOPHEN 1 TABLET: 5; 325 TABLET ORAL at 18:11

## 2024-02-24 RX ADMIN — INSULIN LISPRO 12 UNITS: 100 INJECTION, SOLUTION INTRAVENOUS; SUBCUTANEOUS at 12:22

## 2024-02-24 RX ADMIN — FAMOTIDINE 20 MG: 20 TABLET, FILM COATED ORAL at 21:30

## 2024-02-24 RX ADMIN — MORPHINE SULFATE 2 MG: 2 INJECTION, SOLUTION INTRAMUSCULAR; INTRAVENOUS at 21:39

## 2024-02-24 RX ADMIN — CEFEPIME 2000 MG: 2 INJECTION, POWDER, FOR SOLUTION INTRAVENOUS at 06:25

## 2024-02-24 RX ADMIN — ATORVASTATIN CALCIUM 40 MG: 10 TABLET, FILM COATED ORAL at 21:31

## 2024-02-24 RX ADMIN — METOPROLOL TARTRATE 25 MG: 25 TABLET, FILM COATED ORAL at 21:30

## 2024-02-24 RX ADMIN — Medication 1250 MG: at 23:02

## 2024-02-24 RX ADMIN — HYDROXYZINE HYDROCHLORIDE 25 MG: 25 TABLET, FILM COATED ORAL at 21:39

## 2024-02-24 RX ADMIN — TAMSULOSIN HYDROCHLORIDE 0.4 MG: 0.4 CAPSULE ORAL at 09:35

## 2024-02-24 RX ADMIN — ENOXAPARIN SODIUM 30 MG: 100 INJECTION SUBCUTANEOUS at 09:35

## 2024-02-24 RX ADMIN — SODIUM CHLORIDE: 9 INJECTION, SOLUTION INTRAVENOUS at 05:25

## 2024-02-24 RX ADMIN — ASPIRIN 81 MG: 81 TABLET, COATED ORAL at 09:35

## 2024-02-24 RX ADMIN — NALOXEGOL OXALATE 12.5 MG: 12.5 TABLET, FILM COATED ORAL at 05:16

## 2024-02-24 RX ADMIN — HYDROCODONE BITARTRATE AND ACETAMINOPHEN 2 TABLET: 5; 325 TABLET ORAL at 03:37

## 2024-02-24 RX ADMIN — SODIUM CHLORIDE, PRESERVATIVE FREE 10 ML: 5 INJECTION INTRAVENOUS at 21:31

## 2024-02-24 RX ADMIN — FERROUS SULFATE TAB EC 325 MG (65 MG FE EQUIVALENT) 325 MG: 325 (65 FE) TABLET DELAYED RESPONSE at 09:35

## 2024-02-24 RX ADMIN — MORPHINE SULFATE 2 MG: 2 INJECTION, SOLUTION INTRAMUSCULAR; INTRAVENOUS at 11:54

## 2024-02-24 RX ADMIN — HYDROCODONE BITARTRATE AND ACETAMINOPHEN 1 TABLET: 5; 325 TABLET ORAL at 09:35

## 2024-02-24 ASSESSMENT — PAIN SCALES - WONG BAKER
WONGBAKER_NUMERICALRESPONSE: 4

## 2024-02-24 ASSESSMENT — PAIN DESCRIPTION - LOCATION
LOCATION: LEG
LOCATION: KNEE
LOCATION: LEG
LOCATION: KNEE
LOCATION: BACK;HAND;LEG

## 2024-02-24 ASSESSMENT — PAIN SCALES - GENERAL
PAINLEVEL_OUTOF10: 10
PAINLEVEL_OUTOF10: 3
PAINLEVEL_OUTOF10: 10
PAINLEVEL_OUTOF10: 7
PAINLEVEL_OUTOF10: 8
PAINLEVEL_OUTOF10: 8
PAINLEVEL_OUTOF10: 1
PAINLEVEL_OUTOF10: 2
PAINLEVEL_OUTOF10: 7
PAINLEVEL_OUTOF10: 2
PAINLEVEL_OUTOF10: 8
PAINLEVEL_OUTOF10: 8
PAINLEVEL_OUTOF10: 1

## 2024-02-24 ASSESSMENT — PAIN DESCRIPTION - ORIENTATION
ORIENTATION: LEFT
ORIENTATION: RIGHT;LEFT
ORIENTATION: LEFT
ORIENTATION: RIGHT;LEFT

## 2024-02-24 ASSESSMENT — PAIN DESCRIPTION - DESCRIPTORS
DESCRIPTORS: ACHING;BURNING
DESCRIPTORS: ACHING

## 2024-02-24 NOTE — CONSULTS
[facility]   Pharmacy Pharmacokinetic Monitoring Service - Vancomycin     Nicolas Cardenas is a 68 y.o. male starting on vancomycin therapy for SSTI. Pharmacy consulted by Kelin Hernandez CNP for monitoring and adjustment.    Target Concentration: Goal AUC/DARIAN 400-600 mg*hr/L    Additional Antimicrobials: Zosyn    Pertinent Laboratory Values:   Wt Readings from Last 1 Encounters:   02/23/24 50.3 kg (111 lb)     Temp Readings from Last 1 Encounters:   02/23/24 98.4 °F (36.9 °C) (Oral)     Estimated Creatinine Clearance: 52 mL/min (based on SCr of 1 mg/dL).  Recent Labs     02/23/24  1345   CREATININE 1.0   BUN 16   WBC 27.1*     Procalcitonin: None    Pertinent Cultures:      Plan:  Dosing recommendations based on Bayesian software  Start vancomycin 1250 mg IV Q24H  Anticipated AUC of 524 and trough concentration of 11.9 at steady state  Renal labs as indicated   Vancomycin concentration ordered for 2/26 @ 0600   Pharmacy will continue to monitor patient and adjust therapy as indicated    Thank you for the consult,  Az Zhou RPH  2/23/2024 7:09 PM

## 2024-02-24 NOTE — PROGRESS NOTES
Physical Therapy  DATE: 2024    NAME: Nicolas Cardenas  MRN: [id number]   : 1955    Patient not seen this date for Physical Therapy due to:      [] Cancel by RN or physician due to:    [] Hemodialysis    [] Critical Lab Value Level     [] Blood transfusion in progress    [] Acute or unstable cardiovascular status   _MAP < 55 or more than >115  _HR < 40 or > 130    [] Acute or unstable pulmonary status   -FiO2 > 60%   _RR < 5 or >40    _O2 sats < 85%    [] Strict Bedrest    [] Off Unit for surgery or procedure    [] Off Unit for testing       [] Pending imaging to R/O fracture    [x] Refusal by Patient.  Pt initially agreed to PT eval, but declined any bed mobility or strength testing to his LEs.  States he is too tired and \"I'm in bad shape.\"  Still needs bed mobility and bed to chair transfer evaluated    [] Other      [] PT being discontinued at this time. Patient independent. No further needs.     [] PT being discontinued at this time as the patient has been transferred to hospice care. No further needs.      Rajinder Molina, PT

## 2024-02-24 NOTE — PROGRESS NOTES
SLP ALL NOTES  [facility]  Speech Language Pathology    Date: 2/24/2024  Patient Name: Nicolas Cardenas  YOB: 1955   AGE: 68 y.o.  MRN: [id number]        Patient Not Available for Speech Therapy     Due to:  [] Testing  [] Hemodialysis  [] Cancelled by RN  [] Surgery   [] Intubation/Sedation/Pain Medication  [] Medical instability  [x] Other: SLP called RN at 1351 and recommended pt receive MBSS considering his medical history; RN stated she message dr to determine if they would like to follow this change in care plan; SLP called back 1410 and RN reported dr OK with change to recommend video swallow vs bedside swallow evaluation.    Next scheduled treatment: 2/26 or as able to schedule with radiology  Completed by: Geetha Bravo M.A. CCC-SLP

## 2024-02-24 NOTE — PLAN OF CARE
Problem: Pain  Goal: Verbalizes/displays adequate comfort level or baseline comfort level  2/24/2024 1323 by Micheline Green RN  Outcome: Progressing  2/24/2024 0629 by Aydee Sanderson RN  Outcome: Progressing     Problem: Skin/Tissue Integrity  Goal: Absence of new skin breakdown  Description: 1.  Monitor for areas of redness and/or skin breakdown  2.  Assess vascular access sites hourly  3.  Every 4-6 hours minimum:  Change oxygen saturation probe site  4.  Every 4-6 hours:  If on nasal continuous positive airway pressure, respiratory therapy assess nares and determine need for appliance change or resting period.  2/24/2024 1323 by Micheline Green RN  Outcome: Progressing  2/24/2024 0629 by Aydee Sanderson RN  Outcome: Progressing     Problem: Safety - Adult  Goal: Free from fall injury  2/24/2024 1323 by Micheline Green RN  Outcome: Progressing  2/24/2024 0629 by Aydee Sanderson RN  Outcome: Progressing     Problem: ABCDS Injury Assessment  Goal: Absence of physical injury  2/24/2024 1323 by Micheline Green RN  Outcome: Progressing  2/24/2024 0629 by Aydee Sanderson RN  Outcome: Progressing     Problem: Chronic Conditions and Co-morbidities  Goal: Patient's chronic conditions and co-morbidity symptoms are monitored and maintained or improved  2/24/2024 1323 by Micheline Green RN  Outcome: Progressing  Flowsheets (Taken 2/24/2024 0800)  Care Plan - Patient's Chronic Conditions and Co-Morbidity Symptoms are Monitored and Maintained or Improved: Monitor and assess patient's chronic conditions and comorbid symptoms for stability, deterioration, or improvement  2/24/2024 0629 by Aydee Sanderson RN  Outcome: Progressing     Problem: Respiratory - Adult  Goal: Patient's breath sounds will be clear and equal  2/24/2024 1034 by Fer Baird RCP  Outcome: Progressing  Goal: Able to breathe comfortably  2/24/2024 1034 by Fer Baird RCP  Outcome: Progressing

## 2024-02-24 NOTE — CONSULTS
GENERAL SURGERY  CONSULT NOTE    PATIENT: Nicolas Cardenas           : 1955  MRN: [id number]  ADMISSION DATE: 2024 12:56 PM   TODAY'S DATE: 24     ATTENDING PHYSICIAN: Haleigh Sorto MD  CONSULTING PHYSICIAN: Dr. Kang    REASON FOR CONSULT:  B/L BKA wounds    HISTORY OF PRESENT ILLNESS:  Patient is a 68-year-old male who presents with bilateral BKA wound.  Patient has failure to thrive and has been unable to tolerate diet over the last couple of years.  He has chronic diarrhea.  General surgery consulted for management of bilateral BKA wounds.  Patient has seen Dr. Larry in the past for these wounds and has required operative debridement.  On exam there is a wound to the lateral left BKA with purulence and fibrinous exudate.  The right BKA has an area of skin thickening on the lateral side and a nodule over the tibial tuberosity.  X-ray in the ED did not demonstrate osteomyelitis.  Patient's white count is 22 from 28.  Past medical history includes CKD, COPD, diabetes, liver disease, thyroid disease.  He also has a history of esophageal cancer status post esophageal dilation.  Patient is a current smoker.    PAST MEDICAL HISTORY:        Diagnosis Date    Abdominal pain 2021    Allergic rhinitis     Cellulitis of left lower extremity at BKA stump 4/3/2021    Cellulitis of right heel     Chronic kidney disease     Chronic refractory osteomyelitis of left foot ([facility]) 2021    COPD (chronic obstructive pulmonary disease) ([facility])     Depression     Diabetic neuropathy ([facility])     dr. cortez, podiatrist    Dizziness     DM (diabetes mellitus) ([facility])     , endocrinologist    Esophageal cancer ([facility])     4-5 years ago    GERD (gastroesophageal reflux disease)     Hemodialysis patient ([facility])     Hiatus hernia -large 2021    History of below knee amputation, left ([facility]) 2021    History of Clostridioides difficile colitis 2022    History of colon polyps     History of pulmonary  daily  naloxegol (MOVANTIK) 12.5 MG TABS tablet, Take 1 tablet by mouth every morning (before breakfast)  metoprolol tartrate (LOPRESSOR) 25 MG tablet, Take 1 tablet by mouth 2 times daily  tamsulosin (FLOMAX) 0.4 MG capsule, Take 1 capsule by mouth 2 times daily  insulin detemir (LEVEMIR) 100 UNIT/ML injection vial, Inject 30 units, subcutaenously daily with diner  insulin aspart (NOVOLOG FLEXPEN) 100 UNIT/ML injection pen, Check glucose before meals and inject according to scale. Insulin sliding scale Glucose  = 0 units, 140-199 = 2 units, 200-249 = 4 units, 250-299 = 6 units, 300-349 = 8 units, 350-399 = 10 units, 400-449 = 12 units, over 450 = 12 units and call office  metoprolol tartrate (LOPRESSOR) 25 MG tablet, Take 1 tablet by mouth 2 times daily Hold for heart rate less than 60 or systolic blood pressure less than 100  ferrous sulfate (IRON 325) 325 (65 Fe) MG tablet, Take 1 tablet by mouth daily (with breakfast)  atorvastatin (LIPITOR) 10 MG tablet, Take 1 tablet by mouth nightly  albuterol sulfate HFA (VENTOLIN HFA) 108 (90 Base) MCG/ACT inhaler, Inhale 2 puffs into the lungs every 6 hours as needed for Wheezing  hydrOXYzine (ATARAX) 25 MG tablet, Take 1 tablet by mouth daily (Patient taking differently: Take 25 mg by mouth every 8 hours as needed)  budesonide-formoterol (SYMBICORT) 160-4.5 MCG/ACT AERO, Inhale 2 puffs into the lungs 2 times daily  famotidine (PEPCID) 20 MG tablet, Take 1 tablet by mouth 2 times daily  glucose monitoring (FREESTYLE) kit, 1 kit by Does not apply route daily  Lancets MISC, 1 each by Does not apply route 3 times daily  aspirin EC 81 MG EC tablet, Take 81 mg by mouth daily    REVIEW OF SYSTEMS:    CONSTITUTIONAL: Reports weight loss  HEENT: Denies changes in vision and hearing  RESP: Denies SOB and cough  CV: Denies palpitations and CP  GI: Reports chronic diarrhea  : Denies dysuria and urinary frequency  MSK: Reports pain to bilateral BKAs  SKIN: Denies rash and

## 2024-02-24 NOTE — RT PROTOCOL NOTE
RT Inhaler-Nebulizer Bronchodilator Protocol Note    There is a bronchodilator order in the chart from a provider indicating to follow the RT Bronchodilator Protocol and there is an “Initiate RT Inhaler-Nebulizer Bronchodilator Protocol” order as well (see protocol at bottom of note).    CXR Findings:  No results found.    The findings from the last RT Protocol Assessment were as follows:   History Pulmonary Disease: Chronic pulmonary disease  Respiratory Pattern: Regular pattern and RR 12-20 bpm  Breath Sounds: Clear breath sounds  Cough: Strong, productive  Indication for Bronchodilator Therapy: None  Bronchodilator Assessment Score: 3    Aerosolized bronchodilator medication orders have been revised according to the RT Inhaler-Nebulizer Bronchodilator Protocol below.    Respiratory Therapist to perform RT Therapy Protocol Assessment initially then follow the protocol.  Repeat RT Therapy Protocol Assessment PRN for score 0-3 or on second treatment, BID, and PRN for scores above 3.    No Indications - adjust the frequency to every 6 hours PRN wheezing or bronchospasm, if no treatments needed after 48 hours then discontinue using Per Protocol order mode.     If indication present, adjust the RT bronchodilator orders based on the Bronchodilator Assessment Score as indicated below.  Use Inhaler orders unless patient has one or more of the following: on home nebulizer, not able to hold breath for 10 seconds, is not alert and oriented, cannot activate and use MDI correctly, or respiratory rate 25 breaths per minute or more, then use the equivalent nebulizer order(s) with same Frequency and PRN reasons based on the score.  If a patient is on this medication at home then do not decrease Frequency below that used at home.    0-3 - enter or revise RT bronchodilator order(s) to equivalent RT Bronchodilator order with Frequency of every 4 hours PRN for wheezing or increased work of breathing using Per Protocol order mode.

## 2024-02-24 NOTE — PLAN OF CARE
Problem: Respiratory - Adult  Goal: Patient's breath sounds will be clear and equal  Outcome: Progressing     Problem: Respiratory - Adult  Goal: Able to breathe comfortably  Outcome: Progressing           BRONCHOSPASM/BRONCHOCONSTRICTION    IMPROVE  AERATION/BREATHSOUNDS  ADMINISTER BRONCHODILATOR THERAPY AS APPROPRIATE  ASSESS BREATH SOUNDS  INITIATE AEROSOL PROTOCOL IF ORDERED TO DO SO  PATIENT EDUCATION AS NEEDED

## 2024-02-24 NOTE — PROGRESS NOTES
Allergies:    Allergies   Allergen Reactions    Ativan [Lorazepam] Anaphylaxis    Gabapentin Other (See Comments)     dizziness    Other        Current Meds:   Scheduled Meds:    aspirin EC  81 mg Oral Daily    atorvastatin  40 mg Oral Nightly    budesonide-formoterol  2 puff Inhalation BID RT    famotidine  20 mg Oral BID    ferrous sulfate  325 mg Oral Daily with breakfast    insulin glargine  12 Units SubCUTAneous Dinner    metoprolol tartrate  25 mg Oral BID    naloxegol  12.5 mg Oral QAM AC    oxyBUTYnin  10 mg Oral Daily    tamsulosin  0.4 mg Oral QAM    sodium chloride flush  5-40 mL IntraVENous 2 times per day    enoxaparin  30 mg SubCUTAneous Daily    insulin lispro  0-16 Units SubCUTAneous TID WC    insulin lispro  0-4 Units SubCUTAneous Nightly    scopolamine  1 patch TransDERmal Q72H    cefepime  2,000 mg IntraVENous Q12H    vancomycin (VANCOCIN) intermittent dosing (placeholder)   Other RX Placeholder    vancomycin  1,250 mg IntraVENous Q24H     Continuous Infusions:    sodium chloride 75 mL/hr at 02/24/24 0541    sodium chloride      dextrose       PRN Meds: morphine, albuterol sulfate HFA, hydrOXYzine HCl, sodium chloride flush, sodium chloride, potassium chloride **OR** potassium alternative oral replacement **OR** potassium chloride, magnesium sulfate, ondansetron **OR** ondansetron, polyethylene glycol, acetaminophen **OR** acetaminophen, glucose, dextrose bolus **OR** dextrose bolus, glucagon (rDNA), dextrose, HYDROcodone 5 mg - acetaminophen **OR** HYDROcodone 5 mg - acetaminophen, prochlorperazine    Data:     Past Medical History:   has a past medical history of Abdominal pain, Allergic rhinitis, Cellulitis of left lower extremity at BKA stump, Cellulitis of right heel, Chronic kidney disease, Chronic refractory osteomyelitis of left foot ([facility]), COPD (chronic obstructive pulmonary disease) ([facility]), Depression, Diabetic neuropathy ([facility]), Dizziness, DM (diabetes mellitus) ([facility]), Esophageal        Labs:  Hematology:  Recent Labs     02/23/24  1345 02/24/24  0528   WBC 27.1* 22.8*   RBC 4.54 3.77*   HGB 12.6* 10.6*   HCT 40.1* 33.7*   MCV 88.3 89.4   MCH 27.8 28.1   MCHC 31.4 31.5   RDW 14.4 14.5*   * 511*   MPV 9.3 8.9     Chemistry:  Recent Labs     02/23/24  1345 02/24/24  0528   * 136   K 3.7 3.7    109*   CO2 19* 19*   GLUCOSE 325* 144*   BUN 16 14   CREATININE 1.0 1.0   ANIONGAP 9 8*   LABGLOM >60 >60   CALCIUM 9.0 8.5*     Recent Labs     02/23/24  1813 02/23/24 2014 02/23/24  2111 02/24/24  0624   POCGLU 242* 263* 186* 143*     ABG:  Lab Results   Component Value Date/Time    FIO2 NOT REPORTED 02/19/2022 07:07 PM     Lab Results   Component Value Date/Time    SPECIAL RT AC 10ML 02/23/2024 04:20 PM     Lab Results   Component Value Date/Time    CULTURE NO GROWTH 12 HOURS 02/23/2024 04:20 PM       Radiology:  CT ABDOMEN PELVIS WO CONTRAST Additional Contrast? None    Result Date: 2/23/2024  1. Multiple bilateral nonobstructing renal calculi up to 6 mm in size. Greater stone burden on the right. 2. Tiny bladder calculus at the base. 3. Prostate gland enlargement. 4. Large hiatal hernia. 5. Mild pancreatic duct dilatation up to 5 mm in the body. Lack of IV contrast limits assessment. Findings similar to prior.  This may be assessed in more detail with MRI/MRCP. 6. Bilateral pleural thickening versus trace pleural effusion.     XR FEMUR LEFT (MIN 2 VIEWS)    Result Date: 2/23/2024  1. No radiographic evidence of osteomyelitis. 2. Degenerative changes in the hip and knee joints.       Physical Examination:        Physical Exam  Vitals and nursing note reviewed.   Constitutional:       Appearance: He is cachectic.   HENT:      Mouth/Throat:      Mouth: Mucous membranes are dry.   Eyes:      Conjunctiva/sclera: Conjunctivae normal.   Cardiovascular:      Rate and Rhythm: Normal rate and regular rhythm.      Heart sounds: Normal heart sounds.   Pulmonary:      Effort: Pulmonary effort

## 2024-02-24 NOTE — PLAN OF CARE
Patient A&O x4  Bilat BKA.   Vanco and Cefepime for ulcer to left stump.  Patient states he has chronic diarrhea however has not had a BM tonight. He states he wants the bedpan within reach in case he needs it. He gets himself on and off it without calling at times. He has been on it for 10 min and is refusing to get off the bedpan. He states \"I do this all the time at home, my skin will be fine\"            Problem: Discharge Planning  Goal: Discharge to home or other facility with appropriate resources  Outcome: Progressing     Problem: Pain  Goal: Verbalizes/displays adequate comfort level or baseline comfort level  Outcome: Progressing     Problem: Skin/Tissue Integrity  Goal: Absence of new skin breakdown  Description: 1.  Monitor for areas of redness and/or skin breakdown  2.  Assess vascular access sites hourly  3.  Every 4-6 hours minimum:  Change oxygen saturation probe site  4.  Every 4-6 hours:  If on nasal continuous positive airway pressure, respiratory therapy assess nares and determine need for appliance change or resting period.  Outcome: Progressing     Problem: Safety - Adult  Goal: Free from fall injury  Outcome: Progressing     Problem: ABCDS Injury Assessment  Goal: Absence of physical injury  Outcome: Progressing     Problem: Respiratory - Adult  Goal: Patient's breath sounds will be clear and equal  2/23/2024 2057 by Maria Esther Ward RCP  Outcome: Progressing  Goal: Able to breathe comfortably  2/23/2024 2057 by Maria Esther Ward RCP  Outcome: Progressing     Problem: Chronic Conditions and Co-morbidities  Goal: Patient's chronic conditions and co-morbidity symptoms are monitored and maintained or improved  Outcome: Progressing

## 2024-02-24 NOTE — PLAN OF CARE
Problem: Respiratory - Adult  Goal: Patient's breath sounds will be clear and equal  2/24/2024 1034 by Fer Baird RCP  Outcome: Progressing     Problem: Respiratory - Adult  Goal: Able to breathe comfortably  2/24/2024 1034 by Fer Baird, KENP  Outcome: Progressing

## 2024-02-24 NOTE — CONSULTS
GI Consult Note:    Name: Nicolas Cardenas  MRN: [id number]     Acct: [id number]  Room: 2012/2012-02    Admit Date: 2/23/2024  PCP: Jono Vargas APRN - NP    Physician Requesting Consult: Haleigh Sorto MD     Reason for Consult:    Diarrhea?  History of esophageal cancer  Malnutrition  Inability to gain weight?  History of C. Difficile  Bilateral BKA    Chief Complaint:     Chief Complaint   Patient presents with    Wound Infection     Wounds to bilat stumps       History Obtained From:     Patient and EMR    History of Present Illness:      Nicolas Cardenas is a  68 y.o.  male who presents with Wound Infection (Wounds to bilat stumps)    Gentleman has history significant of multiple chronic medical issues including bilateral below-knee amputation history for esophageal cancer status post surgery 15 years ago patient has been hospitalized mainly at [facility] has been worked up there including EGDs and colonoscopies apparently had done 2 years ago    He has history significant for chronic recurrent C. difficile diarrhea    He claims that he is not able to gain any weight has been having some significant diarrhea but then he says that he has not had a bowel movements in the past 3 days?    According to nursing staff the stool smells like C. difficile?    Patient has some questionable trouble swallowing food specially with pills    Denies any nausea vomiting hematemesis    Has some weakness fatigue and tiredness    He apparently has been evaluated for possible fecal transplant [facility] apparently never had it done?  Patient available records were reviewed  Section at the amputation site  Mildly anemic  Limited blood glucose levels  Limited WBC counts  He denies any overt bleeding melanotic stools or hematemesis  Symptoms:  Onset:  Location:  abdomen  Duration:  week(s)  Severity:  moderate  Quality:  intermittent      Past Medical History:     Past Medical History:   Diagnosis  02/24/2024 05:28 AM    MCHC 31.5 02/24/2024 05:28 AM    RDW 14.5 02/24/2024 05:28 AM    LYMPHOPCT 8 02/24/2024 05:28 AM    MONOPCT 7 02/24/2024 05:28 AM    BASOPCT 1 02/24/2024 05:28 AM    MONOSABS 1.60 02/24/2024 05:28 AM    LYMPHSABS 1.82 02/24/2024 05:28 AM    EOSABS 0.23 02/24/2024 05:28 AM    BASOSABS 0.23 02/24/2024 05:28 AM    DIFFTYPE NOT REPORTED 02/19/2022 06:52 PM     Hemoglobin/Hematocrit:    Lab Results   Component Value Date/Time    HGB 10.6 02/24/2024 05:28 AM    HCT 33.7 02/24/2024 05:28 AM     CMP:    Lab Results   Component Value Date/Time     02/24/2024 05:28 AM    K 3.7 02/24/2024 05:28 AM     02/24/2024 05:28 AM    CO2 19 02/24/2024 05:28 AM    BUN 14 02/24/2024 05:28 AM    CREATININE 1.0 02/24/2024 05:28 AM    GFRAA >60 09/23/2022 09:18 PM    AGRATIO NOT REPORTED 02/19/2022 06:52 PM    LABGLOM >60 02/24/2024 05:28 AM    GLUCOSE 144 02/24/2024 05:28 AM    GLUCOSE 129 05/02/2012 01:42 PM    PROT 7.1 12/19/2023 09:14 PM    LABALBU 3.7 12/19/2023 09:14 PM    LABALBU 4.4 03/05/2012 10:49 AM    CALCIUM 8.5 02/24/2024 05:28 AM    BILITOT 0.1 12/19/2023 09:14 PM    ALKPHOS 141 12/19/2023 09:14 PM    AST 8 12/19/2023 09:14 PM    ALT 5 12/19/2023 09:14 PM     BMP:    Lab Results   Component Value Date/Time     02/24/2024 05:28 AM    K 3.7 02/24/2024 05:28 AM     02/24/2024 05:28 AM    CO2 19 02/24/2024 05:28 AM    BUN 14 02/24/2024 05:28 AM    LABALBU 3.7 12/19/2023 09:14 PM    LABALBU 4.4 03/05/2012 10:49 AM    CREATININE 1.0 02/24/2024 05:28 AM    CALCIUM 8.5 02/24/2024 05:28 AM    GFRAA >60 09/23/2022 09:18 PM    LABGLOM >60 02/24/2024 05:28 AM    GLUCOSE 144 02/24/2024 05:28 AM    GLUCOSE 129 05/02/2012 01:42 PM     PT/INR:    Lab Results   Component Value Date/Time    PROTIME 13.1 09/07/2022 01:45 AM    PROTIME 10.5 05/02/2012 01:42 PM    INR 1.0 09/07/2022 01:45 AM     PTT:    Lab Results   Component Value Date/Time    APTT 26.6 09/07/2022 01:45 AM   [APTT}    Assesment:

## 2024-02-24 NOTE — PROGRESS NOTES
[facility]   Pharmacy Pharmacokinetic Monitoring Service - Vancomycin    Consulting Provider: Kelin Hernandez CNP   Indication: SSTI  Target Concentration: Goal AUC/DARIAN 400-600 mg*hr/L  Day of Therapy: 2  Additional Antimicrobials: Cefepime     Pertinent Laboratory Values:   Wt Readings from Last 1 Encounters:   02/23/24 50.3 kg (111 lb)     Temp Readings from Last 1 Encounters:   02/23/24 98.6 °F (37 °C) (Oral)     Estimated Creatinine Clearance: 52 mL/min (based on SCr of 1 mg/dL).  Recent Labs     02/23/24  1345 02/24/24  0528   CREATININE 1.0 1.0   BUN 16 14   WBC 27.1* 22.8*       Pertinent Cultures:  Culture Date Source Results   2/23 Blood x2 NGTD   MRSA Nasal Swab: N/A. Non-respiratory infection.    Recent vancomycin administrations                     vancomycin (VANCOCIN) 1250 mg in sodium chloride 0.9% 250 mL IVPB (mg) 1,250 mg New Bag 02/23/24 1123                    Plan:  Current dosing regimen is  1250 mg IV q12h   Repeat vancomycin concentration ordered for 2/26 @ 0600   Pharmacy will continue to monitor patient and adjust therapy as indicated    Thank you for the consult,  Asha Cano RPH  2/24/2024 8:09 AM

## 2024-02-25 LAB
C DIFF GDH + TOXINS A+B STL QL IA.RAPID: NEGATIVE
CAMPYLOBACTER DNA SPEC NAA+PROBE: NORMAL
ETEC ELTA+ESTB GENES STL QL NAA+PROBE: NORMAL
GLUCOSE BLD-MCNC: 152 MG/DL (ref 75–110)
GLUCOSE BLD-MCNC: 175 MG/DL (ref 75–110)
GLUCOSE BLD-MCNC: 231 MG/DL (ref 75–110)
GLUCOSE BLD-MCNC: 257 MG/DL (ref 75–110)
P SHIGELLOIDES DNA STL QL NAA+PROBE: NORMAL
SALMONELLA DNA SPEC QL NAA+PROBE: NORMAL
SHIGA TOXIN STX GENE SPEC NAA+PROBE: NORMAL
SHIGELLA DNA SPEC QL NAA+PROBE: NORMAL
SPECIMEN DESCRIPTION: NORMAL
SPECIMEN DESCRIPTION: NORMAL
V CHOL+PARA RFBL+TRKH+TNAA STL QL NAA+PR: NORMAL
Y ENTERO RECN STL QL NAA+PROBE: NORMAL

## 2024-02-25 PROCEDURE — 99232 SBSQ HOSP IP/OBS MODERATE 35: CPT | Performed by: NURSE PRACTITIONER

## 2024-02-25 PROCEDURE — 6370000000 HC RX 637 (ALT 250 FOR IP): Performed by: NURSE PRACTITIONER

## 2024-02-25 PROCEDURE — 94640 AIRWAY INHALATION TREATMENT: CPT

## 2024-02-25 PROCEDURE — 82947 ASSAY GLUCOSE BLOOD QUANT: CPT

## 2024-02-25 PROCEDURE — 97162 PT EVAL MOD COMPLEX 30 MIN: CPT

## 2024-02-25 PROCEDURE — 99231 SBSQ HOSP IP/OBS SF/LOW 25: CPT | Performed by: INTERNAL MEDICINE

## 2024-02-25 PROCEDURE — 1200000000 HC SEMI PRIVATE

## 2024-02-25 PROCEDURE — 99222 1ST HOSP IP/OBS MODERATE 55: CPT | Performed by: INTERNAL MEDICINE

## 2024-02-25 PROCEDURE — 6360000002 HC RX W HCPCS: Performed by: NURSE PRACTITIONER

## 2024-02-25 PROCEDURE — APPNB30 APP NON BILLABLE TIME 0-30 MINS: Performed by: NURSE PRACTITIONER

## 2024-02-25 PROCEDURE — 2580000003 HC RX 258: Performed by: NURSE PRACTITIONER

## 2024-02-25 RX ADMIN — BUDESONIDE AND FORMOTEROL FUMARATE DIHYDRATE 2 PUFF: 160; 4.5 AEROSOL RESPIRATORY (INHALATION) at 07:28

## 2024-02-25 RX ADMIN — HYDROCODONE BITARTRATE AND ACETAMINOPHEN 2 TABLET: 5; 325 TABLET ORAL at 19:34

## 2024-02-25 RX ADMIN — INSULIN LISPRO 4 UNITS: 100 INJECTION, SOLUTION INTRAVENOUS; SUBCUTANEOUS at 12:38

## 2024-02-25 RX ADMIN — METOPROLOL TARTRATE 25 MG: 25 TABLET, FILM COATED ORAL at 08:50

## 2024-02-25 RX ADMIN — ENOXAPARIN SODIUM 30 MG: 100 INJECTION SUBCUTANEOUS at 08:51

## 2024-02-25 RX ADMIN — HYDROCODONE BITARTRATE AND ACETAMINOPHEN 2 TABLET: 5; 325 TABLET ORAL at 08:50

## 2024-02-25 RX ADMIN — HYDROCODONE BITARTRATE AND ACETAMINOPHEN 2 TABLET: 5; 325 TABLET ORAL at 12:55

## 2024-02-25 RX ADMIN — FAMOTIDINE 20 MG: 20 TABLET, FILM COATED ORAL at 19:35

## 2024-02-25 RX ADMIN — ASPIRIN 81 MG: 81 TABLET, COATED ORAL at 08:49

## 2024-02-25 RX ADMIN — MORPHINE SULFATE 2 MG: 2 INJECTION, SOLUTION INTRAMUSCULAR; INTRAVENOUS at 06:00

## 2024-02-25 RX ADMIN — SODIUM CHLORIDE, PRESERVATIVE FREE 10 ML: 5 INJECTION INTRAVENOUS at 08:51

## 2024-02-25 RX ADMIN — ATORVASTATIN CALCIUM 40 MG: 10 TABLET, FILM COATED ORAL at 19:34

## 2024-02-25 RX ADMIN — FAMOTIDINE 20 MG: 20 TABLET, FILM COATED ORAL at 08:50

## 2024-02-25 RX ADMIN — MORPHINE SULFATE 2 MG: 2 INJECTION, SOLUTION INTRAMUSCULAR; INTRAVENOUS at 17:43

## 2024-02-25 RX ADMIN — METOPROLOL TARTRATE 25 MG: 25 TABLET, FILM COATED ORAL at 19:34

## 2024-02-25 RX ADMIN — CEFEPIME 2000 MG: 2 INJECTION, POWDER, FOR SOLUTION INTRAVENOUS at 06:22

## 2024-02-25 RX ADMIN — BUDESONIDE AND FORMOTEROL FUMARATE DIHYDRATE 2 PUFF: 160; 4.5 AEROSOL RESPIRATORY (INHALATION) at 20:20

## 2024-02-25 RX ADMIN — HYDROCODONE BITARTRATE AND ACETAMINOPHEN 2 TABLET: 5; 325 TABLET ORAL at 04:48

## 2024-02-25 RX ADMIN — TAMSULOSIN HYDROCHLORIDE 0.4 MG: 0.4 CAPSULE ORAL at 08:50

## 2024-02-25 RX ADMIN — MORPHINE SULFATE 2 MG: 2 INJECTION, SOLUTION INTRAMUSCULAR; INTRAVENOUS at 01:10

## 2024-02-25 RX ADMIN — INSULIN GLARGINE 12 UNITS: 100 INJECTION, SOLUTION SUBCUTANEOUS at 17:24

## 2024-02-25 RX ADMIN — OXYBUTYNIN CHLORIDE 10 MG: 10 TABLET, EXTENDED RELEASE ORAL at 08:49

## 2024-02-25 RX ADMIN — FERROUS SULFATE TAB EC 325 MG (65 MG FE EQUIVALENT) 325 MG: 325 (65 FE) TABLET DELAYED RESPONSE at 07:42

## 2024-02-25 ASSESSMENT — PAIN DESCRIPTION - DESCRIPTORS
DESCRIPTORS: ACHING
DESCRIPTORS: ACHING;BURNING
DESCRIPTORS: ACHING

## 2024-02-25 ASSESSMENT — PAIN SCALES - GENERAL
PAINLEVEL_OUTOF10: 0
PAINLEVEL_OUTOF10: 2
PAINLEVEL_OUTOF10: 8
PAINLEVEL_OUTOF10: 7
PAINLEVEL_OUTOF10: 10
PAINLEVEL_OUTOF10: 5
PAINLEVEL_OUTOF10: 3
PAINLEVEL_OUTOF10: 9
PAINLEVEL_OUTOF10: 6
PAINLEVEL_OUTOF10: 6
PAINLEVEL_OUTOF10: 10
PAINLEVEL_OUTOF10: 8
PAINLEVEL_OUTOF10: 8
PAINLEVEL_OUTOF10: 5
PAINLEVEL_OUTOF10: 0

## 2024-02-25 ASSESSMENT — PAIN DESCRIPTION - ORIENTATION
ORIENTATION: LEFT
ORIENTATION: RIGHT;LEFT
ORIENTATION: LEFT
ORIENTATION: LEFT;RIGHT
ORIENTATION: RIGHT;LEFT
ORIENTATION: LEFT;RIGHT
ORIENTATION: RIGHT;LEFT
ORIENTATION: LEFT
ORIENTATION: LEFT;RIGHT

## 2024-02-25 ASSESSMENT — PAIN DESCRIPTION - LOCATION
LOCATION: HAND;LEG
LOCATION: HAND;LEG
LOCATION: LEG
LOCATION: LEG;HAND
LOCATION: HAND;LEG
LOCATION: LEG
LOCATION: LEG
LOCATION: HAND;LEG
LOCATION: LEG

## 2024-02-25 NOTE — PROGRESS NOTES
Physical Therapy  Facility/Department: [facility] MED SURG  Physical Therapy Initial Assessment    Name: Nicolas Cardenas  : 1955  MRN: [id number]  Date of Service: 2024    Discharge Recommendations:  Patient would benefit from continued therapy after discharge          History of Present Illness:      Patient presents to the ED with bilateral wound to his bilateral BKAs. Patient has a past medical history of bilateral BKAs, CKD, COPD, diabetes, GERD, hyperlipidemia, hypertension, seizures and hypothyroidism. Patient states that he has been dealing with diarrhea for the past 2 years. He states that any time he eats, he then has to have a bowel movement soon after. Patient also reports nausea and vomiting each morning. Patient states that he has gotten these wounds to his bilateral lower extremities due to his prosthesis not fitting correctly. Patient states that he had been prescribed oral antibiotics prior to coming to the ED but states that he was unable to swallow them due to him having a esophageal cancer history. Patient was encouraged to come to the ED today by his [employer] . She had seen him and stated that he had not been looking well. Patient denies any fevers, chest pain and constipation.      Sodium 131. Glucose 325. WBC 27.1. Hemoglobin 12.6. Platelets 629.      Patient being admitted for failure to thrive with bilateral wounds to his BKAs.     Patient Diagnosis(es): The primary encounter diagnosis was Hyperglycemia. Diagnoses of Chronic diarrhea, Failure to thrive in adult, Dehydration, and Leg ulcer, left, limited to breakdown of skin (HCC) were also pertinent to this visit.  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), COPD (chronic obstructive pulmonary disease) ([facility]), Depression,  Independent  Transfers  Sit to Stand: Dependent/Total  Stand to Sit: Dependent/Total        Balance  Posture: Fair  Sitting - Static: Good  Sitting - Dynamic: Good  Comments: pt requires UE's stability to maintain sitting balance at edge of bed; unable to test standing balance as pt unable to use his prosethetics  Exercise Treatment: deep breathing; positioning education w/ pressure prevention; encouraged sidelyining hip ext ex to maintain good hip motion for future use of prosthetics.            AM-PAC - Mobility 14/24      Goals  Short Term Goals  Time Frame for Short Term Goals: 10  Short Term Goal 1: Bed < -> Chair transfer (face to face transfer not sliding board transfer)  Short Term Goal 2: W/c mobility x 200 ft independently  Short Term Goal 3: Prone lying with nael LE ArOM ex's  Short Term Goal 4: Pt independent with hosp exercise program  Patient Goals   Patient Goals : Pt goal is to be able to get up into chair once he is feeling better       Education  Patient Education  Education Given To: Patient  Education Provided: Role of Therapy;Plan of Care  Education Method: Demonstration;Verbal  Education Outcome: Verbalized understanding;Demonstrated understanding      Therapy Time   Individual   Time In 0900   Time Out 0916   Minutes 16       Total patient care time 25 minutes.      ANJANA MERRITT, PT

## 2024-02-25 NOTE — PLAN OF CARE
Problem: Discharge Planning  Goal: Discharge to home or other facility with appropriate resources  Outcome: Progressing     Problem: Pain  Goal: Verbalizes/displays adequate comfort level or baseline comfort level  2/24/2024 2233 by Aydee Sanderson, RN  Outcome: Progressing     Problem: Skin/Tissue Integrity  Goal: Absence of new skin breakdown  Description: 1.  Monitor for areas of redness and/or skin breakdown  2.  Assess vascular access sites hourly  3.  Every 4-6 hours minimum:  Change oxygen saturation probe site  4.  Every 4-6 hours:  If on nasal continuous positive airway pressure, respiratory therapy assess nares and determine need for appliance change or resting period.  2/24/2024 2233 by Aydee Sanderson, RN  Outcome: Progressing     Problem: Safety - Adult  Goal: Free from fall injury  2/24/2024 2233 by Aydee Sanderson, RN  Outcome: Progressing     Problem: ABCDS Injury Assessment  Goal: Absence of physical injury  2/24/2024 2233 by Aydee Sanderson, RN  Outcome: Progressing

## 2024-02-25 NOTE — PROGRESS NOTES
[facility]   Pharmacy Pharmacokinetic Monitoring Service - Vancomycin    Consulting Provider: Kelin Hernandez CNP   Indication: SSTI  Target Concentration: Goal AUC/DARIAN 400-600 mg*hr/L  Day of Therapy: 3  Additional Antimicrobials: cefepime     Pertinent Laboratory Values:   Wt Readings from Last 1 Encounters:   02/25/24 49.9 kg (110 lb)     Temp Readings from Last 1 Encounters:   02/25/24 97.8 °F (36.6 °C) (Oral)     Estimated Creatinine Clearance: 51 mL/min (based on SCr of 1 mg/dL).  Recent Labs     02/23/24  1345 02/24/24  0528   CREATININE 1.0 1.0   BUN 16 14   WBC 27.1* 22.8*       Pertinent Cultures:  Culture Date Source Results   2/23 Blood x2 NGTD   MRSA Nasal Swab: N/A. Non-respiratory infection.    Recent vancomycin administrations                     vancomycin (VANCOCIN) 1250 mg in sodium chloride 0.9% 250 mL IVPB (mg) 1,250 mg New Bag 02/24/24 2302    vancomycin (VANCOCIN) 1250 mg in sodium chloride 0.9% 250 mL IVPB (mg) 1,250 mg New Bag 02/23/24 2109                    Plan:  Current dosing regimen is  1250 mg q24h  Repeat vancomycin concentration ordered for 2/26 @ 0600   Pharmacy will continue to monitor patient and adjust therapy as indicated    Thank you for the consult,  Asha Cano RPH  2/25/2024 8:39 AM

## 2024-02-25 NOTE — PROGRESS NOTES
[facility]    Gastroenterology Daily Progress Note      Patient:   Nicolas Cardenas   :    1955   Facility:   [facility]  Date:     2024  Consultant:   MARCELO Haro - CNP, CNP      SUBJECTIVE  68 y.o. male admitted 2024 with Adult failure to thrive [R62.7]  Dehydration [E86.0]  Chronic diarrhea [K52.9]  Hyperglycemia [R73.9]  Failure to thrive in adult [R62.7]  Leg ulcer, left, limited to breakdown of skin (HCC) [L97.921] and seen for nausea and diarrhea malnutrition. The pt was seen and examined. No BM for the last two days. C diff testing negative, stool culture results are pending. Tolerating a diet but c/o nausea. Currently denies abdominal pain.         OBJECTIVE  Scheduled Meds:   aspirin EC  81 mg Oral Daily    atorvastatin  40 mg Oral Nightly    budesonide-formoterol  2 puff Inhalation BID RT    famotidine  20 mg Oral BID    ferrous sulfate  325 mg Oral Daily with breakfast    insulin glargine  12 Units SubCUTAneous Dinner    metoprolol tartrate  25 mg Oral BID    [Held by provider] naloxegol  12.5 mg Oral QAM AC    oxyBUTYnin  10 mg Oral Daily    tamsulosin  0.4 mg Oral QAM    sodium chloride flush  5-40 mL IntraVENous 2 times per day    enoxaparin  30 mg SubCUTAneous Daily    insulin lispro  0-16 Units SubCUTAneous TID WC    insulin lispro  0-4 Units SubCUTAneous Nightly    scopolamine  1 patch TransDERmal Q72H    cefepime  2,000 mg IntraVENous Q12H    vancomycin (VANCOCIN) intermittent dosing (placeholder)   Other RX Placeholder    vancomycin  1,250 mg IntraVENous Q24H       Vital Signs:  /64   Pulse 77   Temp 99 °F (37.2 °C) (Oral)   Resp 18   Wt 49.9 kg (110 lb)   SpO2 96%   BMI 14.12 kg/m²    Review of Systems - History obtained from chart review and the patient  General ROS: positive for  - weight loss  Respiratory ROS: no cough, shortness of breath, or wheezing  Cardiovascular ROS: no chest pain or dyspnea on

## 2024-02-25 NOTE — CARE COORDINATION
Social work: call from [facility], they are able to accept patient; updates faxed. Will need precert. NEPTALI started.

## 2024-02-25 NOTE — PLAN OF CARE
Problem: Respiratory - Adult  Goal: Patient's breath sounds will be clear and equal  2/24/2024 2015 by Millie Collado RCP  Outcome: Progressing     Problem: Respiratory - Adult  Goal: Able to breathe comfortably  2/24/2024 2015 by Millie Collado RCP  Outcome: Progressing

## 2024-02-25 NOTE — CONSULTS
[facility]  Initial Consult Note  Date: 2/25/2024    Hospital day :2     Impression:   Diabetes mellitus type 2 with multiple complications including neuropathy  Bilateral below the knee amputations  Bilateral below the knee stump wounds worse on the left where there is an open wound with some slough at the wound base  COPD    Recommendations   The patient has a left lateral leg/BKA stump wound with some slough in the wound bed but no major signs of acute soft tissue infection.  There is definitely no signs of acute infection in the bone.  I do feel that the patient would benefit from continued care in the wound care center  The patient may benefit from debridement  The wound itself is not overtly infected and is likely caused by the prosthetic device that he is using and this will also need to be adjusted  At this point in time I will stop the antibiotic therapy and continue local wound care    Chief complaint/reason for consultation:   Infected wound    History of Present Illness:   Nicolas Cardenas is a 68 y.o.-year-old male who was initially admitted on 2/23/2024.   Mo has multiple medical problems including diabetes mellitus type 2, hypertension, COPD, nephrolithiasis, chronic kidney disease, liver disease, history of pulmonary embolism and esophageal cancer.  He has had multiple issues with wounds in his lower extremities and has subsequently had below the knee amputations bilaterally.  The patient has had chronic diarrhea issues and has been treated for C. difficile in the past as well as yersiniosis.    The patient was brought to the emergency room due to left leg pain, flank pain and inability to care for himself.  The patient was brought in by a friend who thought he was looking unwell and thought of the left leg looked infected and she called 911.  The patient lives alone he was previously in [place] with his daughter.  He reports his leg is painful and that he has had an

## 2024-02-25 NOTE — PLAN OF CARE
Problem: Discharge Planning  Goal: Discharge to home or other facility with appropriate resources  Outcome: Progressing  Flowsheets (Taken 2/25/2024 0900)  Discharge to home or other facility with appropriate resources:   Identify barriers to discharge with patient and caregiver   Arrange for needed discharge resources and transportation as appropriate   Identify discharge learning needs (meds, wound care, etc)   Refer to discharge planning if patient needs post-hospital services based on physician order or complex needs related to functional status, cognitive ability or social support system     Problem: Pain  Goal: Verbalizes/displays adequate comfort level or baseline comfort level  Outcome: Progressing     Problem: Skin/Tissue Integrity  Goal: Absence of new skin breakdown  Description: 1.  Monitor for areas of redness and/or skin breakdown  2.  Assess vascular access sites hourly  3.  Every 4-6 hours minimum:  Change oxygen saturation probe site  4.  Every 4-6 hours:  If on nasal continuous positive airway pressure, respiratory therapy assess nares and determine need for appliance change or resting period.  Outcome: Progressing     Problem: Safety - Adult  Goal: Free from fall injury  Outcome: Progressing     Problem: ABCDS Injury Assessment  Goal: Absence of physical injury  Outcome: Progressing     Problem: Chronic Conditions and Co-morbidities  Goal: Patient's chronic conditions and co-morbidity symptoms are monitored and maintained or improved  Outcome: Progressing  Flowsheets (Taken 2/25/2024 0900)  Care Plan - Patient's Chronic Conditions and Co-Morbidity Symptoms are Monitored and Maintained or Improved:   Monitor and assess patient's chronic conditions and comorbid symptoms for stability, deterioration, or improvement   Collaborate with multidisciplinary team to address chronic and comorbid conditions and prevent exacerbation or deterioration   Update acute care plan with appropriate goals if chronic

## 2024-02-25 NOTE — PROGRESS NOTES
[facility]  Office: [phone]  Baldomero Maldonado DO, Luis Valdivia DO, Eric Rojas DO, Rajinder Chiu DO, Erica Daugherty MD, Carolina Hernandez MD, Haleigh Sorto MD, Hannah Blum MD,  Isael Velazco MD, Cora Coughlin MD, Alex Ferrell MD,  Chela Doan DO, Trent Kearns MD, Manolo Potter MD, Hans Maldonado DO, Mei Tolbert MD,  Gianni De La Rosa DO, Aranza Wakefield MD, Magdalena Dill MD, Mary Ann Cheek MD, Jenny Williamson MD,  Panfilo Hall MD, Dorcas Gillis MD, Nato Gunter MD, Opal Davis MD, Red Small MD, Giana Larsen MD, Roberth Eason DO, Carmelo Conroy DO, Marj Davis MD,  Joe Franklin MD, Shirley Waterhouse, CNP,  Xena Harrell, CNP, Trenton Campbell, CNP,  Celena Corona, DNP, Terrie Mathur, CNP, Dalia Lopes, CNP, Kelin Hernandez CNP, Chiquita Manuel, CNP, Anh Kolb, CNP, Geetha Gold, PA-C, Rubina Miller, PA-C, Gris Thorpe, CNP, Paige Higgins, CNP, Elizabeth Clemente, CNP, Lea Fletcher, CNS, Maria Esther Silverio, CNP, Norma Soliman, CNP, Tracy Schwab, CNP         [facility]   IN-PATIENT SERVICE   [facility]    Progress Note    2/25/2024    11:10 AM    Name:   Nicolas Cardenas  MRN:     [id number]     Acct:      [id number]   Room:   2012/2012-02  IP Day:  2  Admit Date:  2/23/2024 12:56 PM    PCP:   Jono Vargas APRN - NP  Code Status:  Full Code    Subjective:     C/C:   Chief Complaint   Patient presents with    Wound Infection     Wounds to bilat stumps     Interval History Status: Slightly improved    Speech therapy recommending video swallow related to patient's complaint of occasional difficulty swallowing    He continues to complain of a bowel movement 30 minutes after any p.o. intake, no c/o Nausea or vomiting     He reports tolerating p.o. intake better today      Brief History:     Per previous documentation  \"Patient presents to the ED with bilateral wound to his bilateral BKAs. Patient has a past medical history of  surgery consult  Adaptic with Silver alginate to change daily per vascular  IV antibiotics ordered; ID consulted  As needed pain medication  Hypertension  Continue metoprolol with holding parameters    Monitor labs    MARCELO Jerry CNP  2/25/2024  11:10 AM
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
## 2024-02-26 ENCOUNTER — APPOINTMENT (OUTPATIENT)
Dept: GENERAL RADIOLOGY | Age: 69
End: 2024-02-26
Payer: MEDICARE

## 2024-02-26 PROBLEM — E44.0 MODERATE MALNUTRITION (HCC): Chronic | Status: ACTIVE | Noted: 2024-02-26

## 2024-02-26 LAB
ANION GAP SERPL CALCULATED.3IONS-SCNC: 8 MMOL/L (ref 9–17)
BUN SERPL-MCNC: 23 MG/DL (ref 8–23)
BUN/CREAT SERPL: 26 (ref 9–20)
CALCIUM SERPL-MCNC: 8.2 MG/DL (ref 8.6–10.4)
CHLORIDE SERPL-SCNC: 112 MMOL/L (ref 98–107)
CO2 SERPL-SCNC: 22 MMOL/L (ref 20–31)
CREAT SERPL-MCNC: 0.9 MG/DL (ref 0.7–1.2)
CRP SERPL HS-MCNC: 138 MG/L (ref 0–5)
ERYTHROCYTE [DISTWIDTH] IN BLOOD BY AUTOMATED COUNT: 14.9 % (ref 11.8–14.4)
ERYTHROCYTE [SEDIMENTATION RATE] IN BLOOD BY PHOTOMETRIC METHOD: 35 MM/HR (ref 0–20)
GFR SERPL CREATININE-BSD FRML MDRD: >60 ML/MIN/1.73M2
GLUCOSE BLD-MCNC: 120 MG/DL (ref 75–110)
GLUCOSE BLD-MCNC: 272 MG/DL (ref 75–110)
GLUCOSE BLD-MCNC: 99 MG/DL (ref 75–110)
GLUCOSE SERPL-MCNC: 105 MG/DL (ref 70–99)
HCT VFR BLD AUTO: 30.5 % (ref 40.7–50.3)
HGB BLD-MCNC: 9.5 G/DL (ref 13–17)
MCH RBC QN AUTO: 28.1 PG (ref 25.2–33.5)
MCHC RBC AUTO-ENTMCNC: 31.1 G/DL (ref 28.4–34.8)
MCV RBC AUTO: 90.2 FL (ref 82.6–102.9)
NRBC BLD-RTO: 0 PER 100 WBC
PLATELET # BLD AUTO: 481 K/UL (ref 138–453)
PMV BLD AUTO: 9.1 FL (ref 8.1–13.5)
POTASSIUM SERPL-SCNC: 3.2 MMOL/L (ref 3.7–5.3)
RBC # BLD AUTO: 3.38 M/UL (ref 4.21–5.77)
SODIUM SERPL-SCNC: 142 MMOL/L (ref 135–144)
WBC OTHER # BLD: 18.8 K/UL (ref 3.5–11.3)

## 2024-02-26 PROCEDURE — 6370000000 HC RX 637 (ALT 250 FOR IP): Performed by: NURSE PRACTITIONER

## 2024-02-26 PROCEDURE — 85027 COMPLETE CBC AUTOMATED: CPT

## 2024-02-26 PROCEDURE — 6360000002 HC RX W HCPCS: Performed by: NURSE PRACTITIONER

## 2024-02-26 PROCEDURE — 86140 C-REACTIVE PROTEIN: CPT

## 2024-02-26 PROCEDURE — 99232 SBSQ HOSP IP/OBS MODERATE 35: CPT | Performed by: INTERNAL MEDICINE

## 2024-02-26 PROCEDURE — 94761 N-INVAS EAR/PLS OXIMETRY MLT: CPT

## 2024-02-26 PROCEDURE — 2580000003 HC RX 258: Performed by: NURSE PRACTITIONER

## 2024-02-26 PROCEDURE — 99213 OFFICE O/P EST LOW 20 MIN: CPT

## 2024-02-26 PROCEDURE — 85652 RBC SED RATE AUTOMATED: CPT

## 2024-02-26 PROCEDURE — 80048 BASIC METABOLIC PNL TOTAL CA: CPT

## 2024-02-26 PROCEDURE — 82947 ASSAY GLUCOSE BLOOD QUANT: CPT

## 2024-02-26 PROCEDURE — 1200000000 HC SEMI PRIVATE

## 2024-02-26 PROCEDURE — 99232 SBSQ HOSP IP/OBS MODERATE 35: CPT | Performed by: NURSE PRACTITIONER

## 2024-02-26 PROCEDURE — 94640 AIRWAY INHALATION TREATMENT: CPT

## 2024-02-26 PROCEDURE — 36415 COLL VENOUS BLD VENIPUNCTURE: CPT

## 2024-02-26 RX ORDER — DIPHENHYDRAMINE HYDROCHLORIDE 50 MG/ML
25 INJECTION INTRAMUSCULAR; INTRAVENOUS ONCE
Status: DISCONTINUED | OUTPATIENT
Start: 2024-02-26 | End: 2024-02-26

## 2024-02-26 RX ORDER — MORPHINE SULFATE 4 MG/ML
4 INJECTION, SOLUTION INTRAMUSCULAR; INTRAVENOUS EVERY 4 HOURS PRN
Status: DISCONTINUED | OUTPATIENT
Start: 2024-02-26 | End: 2024-03-01 | Stop reason: HOSPADM

## 2024-02-26 RX ORDER — TRAZODONE HYDROCHLORIDE 50 MG/1
50 TABLET ORAL ONCE
Status: COMPLETED | OUTPATIENT
Start: 2024-02-26 | End: 2024-02-26

## 2024-02-26 RX ADMIN — TAMSULOSIN HYDROCHLORIDE 0.4 MG: 0.4 CAPSULE ORAL at 10:27

## 2024-02-26 RX ADMIN — BUDESONIDE AND FORMOTEROL FUMARATE DIHYDRATE 2 PUFF: 160; 4.5 AEROSOL RESPIRATORY (INHALATION) at 19:41

## 2024-02-26 RX ADMIN — FAMOTIDINE 20 MG: 20 TABLET, FILM COATED ORAL at 10:27

## 2024-02-26 RX ADMIN — ASPIRIN 81 MG: 81 TABLET, COATED ORAL at 10:27

## 2024-02-26 RX ADMIN — PROCHLORPERAZINE EDISYLATE 10 MG: 5 INJECTION INTRAMUSCULAR; INTRAVENOUS at 00:48

## 2024-02-26 RX ADMIN — POTASSIUM CHLORIDE 40 MEQ: 1500 TABLET, EXTENDED RELEASE ORAL at 10:28

## 2024-02-26 RX ADMIN — HYDROCODONE BITARTRATE AND ACETAMINOPHEN 2 TABLET: 5; 325 TABLET ORAL at 23:27

## 2024-02-26 RX ADMIN — ONDANSETRON 4 MG: 4 TABLET, ORALLY DISINTEGRATING ORAL at 04:31

## 2024-02-26 RX ADMIN — SODIUM CHLORIDE: 9 INJECTION, SOLUTION INTRAVENOUS at 00:29

## 2024-02-26 RX ADMIN — HYDROCODONE BITARTRATE AND ACETAMINOPHEN 2 TABLET: 5; 325 TABLET ORAL at 14:04

## 2024-02-26 RX ADMIN — BUDESONIDE AND FORMOTEROL FUMARATE DIHYDRATE 2 PUFF: 160; 4.5 AEROSOL RESPIRATORY (INHALATION) at 07:43

## 2024-02-26 RX ADMIN — FAMOTIDINE 20 MG: 20 TABLET, FILM COATED ORAL at 20:25

## 2024-02-26 RX ADMIN — MORPHINE SULFATE 4 MG: 4 INJECTION, SOLUTION INTRAMUSCULAR; INTRAVENOUS at 17:43

## 2024-02-26 RX ADMIN — PSYLLIUM HUSK 1 PACKET: 3.4 POWDER ORAL at 13:12

## 2024-02-26 RX ADMIN — INSULIN GLARGINE 12 UNITS: 100 INJECTION, SOLUTION SUBCUTANEOUS at 17:42

## 2024-02-26 RX ADMIN — INSULIN LISPRO 8 UNITS: 100 INJECTION, SOLUTION INTRAVENOUS; SUBCUTANEOUS at 17:42

## 2024-02-26 RX ADMIN — MORPHINE SULFATE 2 MG: 2 INJECTION, SOLUTION INTRAMUSCULAR; INTRAVENOUS at 00:27

## 2024-02-26 RX ADMIN — TRAZODONE HYDROCHLORIDE 50 MG: 50 TABLET ORAL at 21:18

## 2024-02-26 RX ADMIN — HYDROCODONE BITARTRATE AND ACETAMINOPHEN 2 TABLET: 5; 325 TABLET ORAL at 04:31

## 2024-02-26 RX ADMIN — ATORVASTATIN CALCIUM 40 MG: 10 TABLET, FILM COATED ORAL at 20:25

## 2024-02-26 RX ADMIN — MORPHINE SULFATE 4 MG: 4 INJECTION, SOLUTION INTRAMUSCULAR; INTRAVENOUS at 21:55

## 2024-02-26 RX ADMIN — OXYBUTYNIN CHLORIDE 10 MG: 10 TABLET, EXTENDED RELEASE ORAL at 10:27

## 2024-02-26 RX ADMIN — MORPHINE SULFATE 2 MG: 2 INJECTION, SOLUTION INTRAMUSCULAR; INTRAVENOUS at 07:43

## 2024-02-26 RX ADMIN — ENOXAPARIN SODIUM 30 MG: 100 INJECTION SUBCUTANEOUS at 10:29

## 2024-02-26 RX ADMIN — METOPROLOL TARTRATE 25 MG: 25 TABLET, FILM COATED ORAL at 10:27

## 2024-02-26 RX ADMIN — METOPROLOL TARTRATE 25 MG: 25 TABLET, FILM COATED ORAL at 20:25

## 2024-02-26 ASSESSMENT — PAIN SCALES - GENERAL
PAINLEVEL_OUTOF10: 10
PAINLEVEL_OUTOF10: 8
PAINLEVEL_OUTOF10: 10
PAINLEVEL_OUTOF10: 0
PAINLEVEL_OUTOF10: 8
PAINLEVEL_OUTOF10: 7
PAINLEVEL_OUTOF10: 0
PAINLEVEL_OUTOF10: 9
PAINLEVEL_OUTOF10: 4

## 2024-02-26 ASSESSMENT — ENCOUNTER SYMPTOMS
ABDOMINAL PAIN: 1
SHORTNESS OF BREATH: 1
VOMITING: 0
NAUSEA: 0
RESPIRATORY NEGATIVE: 1
DIARRHEA: 1
GASTROINTESTINAL NEGATIVE: 1
SINUS PRESSURE: 0
COUGH: 0
CONSTIPATION: 1
PHOTOPHOBIA: 0
ABDOMINAL DISTENTION: 0
SINUS PAIN: 0
WHEEZING: 0

## 2024-02-26 ASSESSMENT — PAIN - FUNCTIONAL ASSESSMENT: PAIN_FUNCTIONAL_ASSESSMENT: PREVENTS OR INTERFERES SOME ACTIVE ACTIVITIES AND ADLS

## 2024-02-26 ASSESSMENT — PAIN DESCRIPTION - ORIENTATION
ORIENTATION: RIGHT;LEFT
ORIENTATION: RIGHT;LEFT
ORIENTATION: LEFT
ORIENTATION: LEFT

## 2024-02-26 ASSESSMENT — PAIN DESCRIPTION - DESCRIPTORS
DESCRIPTORS: ACHING
DESCRIPTORS: ACHING;DISCOMFORT
DESCRIPTORS: ACHING;DISCOMFORT
DESCRIPTORS: ACHING

## 2024-02-26 ASSESSMENT — PAIN DESCRIPTION - LOCATION
LOCATION: LEG
LOCATION: LEG
LOCATION: BUTTOCKS;LEG
LOCATION: LEG

## 2024-02-26 NOTE — PLAN OF CARE
Problem: Respiratory - Adult  Goal: Patient's breath sounds will be clear and equal  2/26/2024 0815 by Anahi Iglesias RCP  Outcome: Progressing  2/25/2024 2018 by Millie Collado RCP  Outcome: Progressing  2/25/2024 2008 by Millie Collado RCP  Outcome: Progressing  Goal: Able to breathe comfortably  2/26/2024 0815 by Anahi Iglesias RCP  Outcome: Progressing  2/25/2024 2018 by Millie Collado RCP  Outcome: Progressing  2/25/2024 2008 by Millie Collado RCP  Outcome: Progressing

## 2024-02-26 NOTE — CARE COORDINATION
Discharge Planning:   Patients daughter called. She stated that she does NOT want patient to go to [facility] and would like a referral to [facility]. Informed [facility] at [facility] and sent referral to [facility].

## 2024-02-26 NOTE — PLAN OF CARE
Problem: Discharge Planning  Goal: Discharge to home or other facility with appropriate resources  2/25/2024 2110 by Aydee Sanderson RN  Outcome: Progressing     Problem: Pain  Goal: Verbalizes/displays adequate comfort level or baseline comfort level  2/25/2024 2110 by Aydee Sanderson RN  Outcome: Progressing     Problem: Skin/Tissue Integrity  Goal: Absence of new skin breakdown  Description: 1.  Monitor for areas of redness and/or skin breakdown  2.  Assess vascular access sites hourly  3.  Every 4-6 hours minimum:  Change oxygen saturation probe site  4.  Every 4-6 hours:  If on nasal continuous positive airway pressure, respiratory therapy assess nares and determine need for appliance change or resting period.  2/25/2024 2110 by Aydee Sanderson RN  Outcome: Progressing     Problem: Safety - Adult  Goal: Free from fall injury  2/25/2024 2110 by Aydee Sanderson RN  Outcome: Progressing     Problem: ABCDS Injury Assessment  Goal: Absence of physical injury  2/25/2024 2110 by Aydee Sanderson RN  Outcome: Progressing     Problem: Chronic Conditions and Co-morbidities  Goal: Patient's chronic conditions and co-morbidity symptoms are monitored and maintained or improved  2/25/2024 2110 by Aydee Sanderson RN  Outcome: Progressing

## 2024-02-26 NOTE — PROGRESS NOTES
Patient rude being disrespectful talking down on staff. Patient told dietary staff to open containers and move tray because that their job loudly. Writer informed patient that dietary can assist with opening containers if you ask by their job is to past trays writer or PCT can help with tray. Patient concerned about restarted IV that he pulled out this am. Writer informed RICHARD Albarado states to leave IV out. Patient go on to say ,\"staff is lazy that is why IV isn't started and staff should be on 8hr shifts because they aren't worth anything after.\" Patient states \"I wanted my pain medication at night through IV so I can sleep.\" Writer informed of po Norco and that morphine IV was discontinued. Patient asked to speak with RICHARD Albarado. Writer called to inform of situation new order for IM Morphine received. Writer updated patient on new orders.

## 2024-02-26 NOTE — PROGRESS NOTES
Occupational Therapy  [facility]  Occupational Therapy Not Seen Note    Patient not available for Occupational Therapy due to:    [] Testing:    [] Hemodialysis    [] Cancelled by RN:    []Refusal by Patient:    [] Surgery:     [] Intubation:     [] Pain Medication:    [] Sedation:     [] Spine Precautions :    [] Medical Instability:    [x] Other: pt stating that he cannot participate in session as he is on bedpan and states he may be on it for an hour. Will check back today if able or tomorrow.     Addendum: Checked back 2nd time this date and pt on bedpan again.     Shira Verdugo, OTR/L
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
## 2024-02-26 NOTE — PROGRESS NOTES
Physical Therapy  DATE: 2024    NAME: Nicolas Cardenas  MRN: [id number]   : 1955    Patient not seen this date for Physical Therapy due to:      [] Cancel by RN or physician due to:    [] Hemodialysis    [] Critical Lab Value Level     [] Blood transfusion in progress    [] Acute or unstable cardiovascular status   _MAP < 55 or more than >115  _HR < 40 or > 130    [] Acute or unstable pulmonary status   -FiO2 > 60%   _RR < 5 or >40    _O2 sats < 85%    [] Strict Bedrest    [] Off Unit for surgery or procedure    [] Off Unit for testing       [] Pending imaging to R/O fracture    [x] Refusal by Patient: Patient on bed pan and states he will be on it for an hour, offered to transfer patient onto toilet or commode, but patient refused stating the toilet is too uncomfortable.       [] Other      [] PT being discontinued at this time. Patient independent. No further needs.     [] PT being discontinued at this time as the patient has been transferred to hospice care. No further needs.      Poonam Clifford, PT

## 2024-02-26 NOTE — PLAN OF CARE
Problem: Respiratory - Adult  Goal: Patient's breath sounds will be clear and equal  2/25/2024 2018 by Millie Collado RCP  Outcome: Progressing     Problem: Respiratory - Adult  Goal: Able to breathe comfortably  2/25/2024 2018 by Millie Collado RCP  Outcome: Progressing

## 2024-02-26 NOTE — CARE COORDINATION
Social work: Phone call from [facility], stating they will NOT be taking patient back on service at discharge.   Patient was current with them for PT/OT/Skilled nursing.

## 2024-02-26 NOTE — PROGRESS NOTES
Comprehensive Nutrition Assessment    Type and Reason for Visit:  Consult (Poor intake/ appetite 5 or more days)    Nutrition Recommendations/Plan:   ADULT DIET; Dysphagia - Soft and Bite Sized; 4 carb choices (60 gm/meal)  Modify oral nutrition supplements to Trent 2x/day and Ensure High Protein 2x/day  Monitor p.o intakes and labs     Malnutrition Assessment:  Malnutrition Status:  Moderate malnutrition (02/26/24 1814)    Context:  Acute Illness     Findings of the 6 clinical characteristics of malnutrition:  Energy Intake:  50% or less of estimated energy requirements for 5 or more days  Weight Loss:  5% over 1 month     Body Fat Loss:  Mild body fat loss Triceps   Muscle Mass Loss:  Mild muscle mass loss Clavicles (pectoralis & deltoids)  Fluid Accumulation:  No significant fluid accumulation Extremities   Strength:  Not Performed    Nutrition Assessment:    Patient admission is related to failure to thrive in adult and leg ulcer. Patient has a medical history for bilateral BKA, esophageal cancer and diabetes mellitus. Patient lives alone and reports poor appetite and chronic diarrhea. Patient reports weight loss, fat and muscle mass loss. Patient reports years ago he was 230 lbs but now his weight has gone down to less than 120 lbs. Patient has lost 5% of weight in 1 month. Patient has noted fat and muscle mass loss. Patient has bilateral wound to BKA stumps and has been unable to wear prosthetics. Patient requested an oral nutrition supplement with more protein. Oral nutrition supplement will be modified to Trent 2x/day and Ensure High Protein 2x/day. Continue Dysphagia Soft and Bite Sized; 4 carb choices diet. Monitor p.o intake and labs.    Nutrition Related Findings:    No edema. Active bowel sounds. Chronic diarrhea. Loss of appetite. BKA. Bilateral stump wounds Wound Type: Unstageable, Stage III       Current Nutrition Intake & Therapies:    Average Meal Intake: 1-25%  Average Supplements Intake:

## 2024-02-26 NOTE — CARE COORDINATION
Social work; pt will need to comply with therapy to help with precert. Will work with [facility] daughter's snf choice who is working on the precert. Celena camacho    Social work: daughter called in and advised her that any help she can be with pt understanding therapy is needed is essential. She did agree to talk to him. She wanted to provide old therapy notes from his favorite therapist last year. Explained therapy notes have to be current. Asked for her help with pt cooperation. Celena camacho

## 2024-02-26 NOTE — DISCHARGE INSTR - COC
Continuity of Care Form    Patient Name: Nicolas Cardenas   :  1955  MRN:  [id number]    Admit date:  2024  Discharge date:  2024    Code Status Order: Full Code   Advance Directives:     Admitting Physician:  Haleigh Sorto MD  PCP: Jono Vargas APRN - NP    Discharging Nurse: Hans Chacko RN    Discharging Hospital Unit/Room#:   Discharging Unit Phone Number: [phone]    Emergency Contact:   Extended Emergency Contact Information  Primary Emergency Contact: Marisela Ramirez  Home Phone: [phone]  Relation: Child   needed? No  Secondary Emergency Contact: Fartun Baeza  Address: [place] [address]  Home Phone: [phone]  Relation: Parent    Past Surgical History:  Past Surgical History:   Procedure Laterality Date    BLADDER SURGERY Bilateral 2022    CYSTOSCOPY BILATERALRETROGRADE PYELOGRAM  BILATERAL STENT INSERTION performed by Nicolas Gao MD at [facility] OR    COLONOSCOPY  2015    hyperplastic polyp    COLONOSCOPY  2017    ESOPHAGECTOMY      cancer    FOOT DEBRIDEMENT Left 2021    I&D LEFT FOOT WITH REMOVAL OF NONVIABLE BONE AND SOFT TISSUE performed by Rosa Merino DPM at [facility] OR    FOOT DEBRIDEMENT Left 2021    LEFT FOOT DEBRIDEMENT WITH REMOVAL ALL NON VIABLE SOFT TISSUE AND BONE performed by Rosa Merino DPM at [facility] OR    FOOT SURGERY Right 2016    I & D heel    FOOT SURGERY Right 2016    I & D    FRACTURE SURGERY Left 2015    humerus left, left leg    HC CATH POWER PICC SINGLE  2021         IR INS PICC VAD W SQ PORT GREATER THAN 5  2020    IR INS PICC VAD W SQ PORT GREATER THAN 5 2020 MD [facility] PROCEDURES    IR INS PICC VAD W SQ PORT GREATER THAN 5  2021    IR INS PICC VAD W SQ PORT GREATER THAN 5 2021 MD [facility] PROC[facility]    IR INS PICC VAD W SQ PORT GREATER THAN 5  2021    IR INS PICC VAD W SQ PORT GREATER THAN 5 2021 Zackary  Change Due 02/28/24 02/26/24 1043   Wound Length (cm) 5.1 cm 02/26/24 1043   Wound Width (cm) 4.2 cm 02/26/24 1043   Wound Depth (cm) 0.4 cm 02/26/24 1043   Wound Surface Area (cm^2) 21.42 cm^2 02/26/24 1043   Wound Volume (cm^3) 8.568 cm^3 02/26/24 1043   Wound Assessment Slough;[place]/red 02/26/24 1043   Drainage Amount Small (< 25%) 02/26/24 1043   Drainage Description Serosanguinous 02/26/24 1043   Odor None 02/26/24 1043   Odalys-wound Assessment Blanchable erythema;Intact;Warm 02/26/24 1043   Margins Defined edges 02/26/24 1043   Number of days: 2     Wound care directions:  Right lateral leg - Cleanse with foaming soap and water, pat dry. Apply skin barrier wipe to wound edges/perimeter. Apply foam dressing to cover. Change every 3 days and as needed if loose or soiled.    Left lateral leg - Cleanse with foaming soap and water, pat dry.Cover wound with Maxorb Ag, reinforce with ABD pad, secure with roll gauze. Change dressing every other day and as needed if loose or soiled.       Elimination:  Continence:   Bowel: Yes  Bladder: Yes  Urinary Catheter: None   Colostomy/Ileostomy/Ileal Conduit: No       Date of Last BM: 03/01/2024    Intake/Output Summary (Last 24 hours) at 2/26/2024 1043  Last data filed at 2/26/2024 1023  Gross per 24 hour   Intake 649.12 ml   Output 1100 ml   Net -450.88 ml     I/O last 3 completed shifts:  In: 1646.7 [P.O.:200; I.V.:950.6; IV Piggyback:496.1]  Out: 850 [Urine:850]    Safety Concerns:     At Risk for Falls    Impairments/Disabilities:      Amputation - BKA    Nutrition Therapy:  Current Nutrition Therapy:   - Oral Diet:  Carb Control 4 carbs/meal (1800kcals/day)    Routes of Feeding: Oral  Liquids: No Restrictions  Daily Fluid Restriction: no  Last Modified Barium Swallow with Video (Video Swallowing Test): not done    Treatments at the Time of Hospital Discharge:   Respiratory Treatments: n/a  Oxygen Therapy:  is not on home oxygen therapy.  Ventilator:    - No ventilator

## 2024-02-26 NOTE — PROGRESS NOTES
[facility]  Progress Note    Nicolas Cardenas  MRN: [id number]  Date: 2/26/2024  LOS: 3     Reason for F/U :   Multiple wounds    Impression :   Diabetes mellitus type 2 with multiple complications including neuropathy  Bilateral below the knee amputations  Bilateral below the knee stump wounds worse on the left where there is an open wound with some slough at the wound base  COPD    Recommendations:   The left lateral knee wound is related to pressure from his prosthesis which is no longer wearing  At this point in time he does not have any signs of active infection there is slough in the wound base and the plan is to treat this with alginate  I had a lengthy discussion with the daughter stating that the wound is not currently infected, the daughter was requesting a wound culture to be done and I was telling her that the wound was not infected so a culture at this point in time would not be meaningful  We discussed that Rj needs to be in a situation where he does not continue to cause further damage/harm to this wound and this could lead to further complications including an above-the-knee amputation  The daughter agreed and was requesting for Rj to come back to [place] which is where she lives and at this point in time Rj was declining this  From an infectious disease standpoint the patient is okay for discharge on local wound care without systemic antibiotic therapy    Infection Control Recommendations:   Universal precautions    Discharge Planning:   Patient will need Midline Catheter Insertion/ PICC line Insertion: No  Patient will need: Home IV , Infusion Center,  SNF,  LTAC: Undetermined  Patient willneed outpatient wound care: No    Medical Decision making / Summary of Stay:   Nicolas Cardenas is a 68 y.o.-year-old male who was initially admitted on 2/23/2024.   Mo has multiple medical problems including diabetes mellitus type 2, hypertension, COPD, nephrolithiasis, chronic  AC 10ML    Culture NO GROWTH 2 DAYS   Culture, Blood 2 [[id number]] Collected: 02/23/24 1605   Order Status: Completed Specimen: Blood Updated: 02/25/24 2005    Specimen Description .BLOOD    Special Requests LT AC 10ML    Culture NO GROWTH 2 DAYS   Gastrointestinal Panel, Molecular [[id number]] Collected: 02/24/24 0945   Order Status: Completed Specimen: Stool Updated: 02/25/24 0956    Specimen Description .FECES    Campylobacter PCR NEGATIVE: No Campylobacter spp. (jejuni or coli) DNA Detected    Salmonella PCR NEGATIVE: No Salmonella spp. DNA Detected    Shigatoxin Gene PCR NEGATIVE: No Shiga toxin-producing gene(s) Detected    Shigella Sp PCR NEGATIVE: No Shigella spp. / EIEC DNA Detected    Plesiomonas Shigelloides PCR NEGATIVE: No Plesionomas shigelloides DNA Detected    Vibrio PCR NEGATIVE: No Vibrio (V. vulnificus, V, parahaemolyticus and V. cholerae) DNA Detected    E Coli Enterotoxigenic PCR NEGATIVE: No Enterotoxigenic E. coli (ETEC) Heat-labile and heat-stable (LT/ST) DNA Detected    Yersinia Enterocolitica PCR NEGATIVE: No Yersinia enterocolitica DNA Detected   C DIFF TOXIN/ANTIGEN [[id number]] Collected: 02/24/24 0945   Order Status: Completed Specimen: Stool Updated: 02/25/24 0727    Specimen Description .FECES    C DIFF AG + TOXIN NEGATIVE    Comment: No C. difficile antigen and Toxin Detected.          Medications:      psyllium husk-aspartame  1 packet Oral Daily    aspirin EC  81 mg Oral Daily    atorvastatin  40 mg Oral Nightly    budesonide-formoterol  2 puff Inhalation BID RT    famotidine  20 mg Oral BID    ferrous sulfate  325 mg Oral Daily with breakfast    insulin glargine  12 Units SubCUTAneous Dinner    metoprolol tartrate  25 mg Oral BID    oxyBUTYnin  10 mg Oral Daily    tamsulosin  0.4 mg Oral QAM    sodium chloride flush  5-40 mL IntraVENous 2 times per day    enoxaparin  30 mg SubCUTAneous Daily    insulin lispro  0-16 Units SubCUTAneous TID WC    insulin lispro  0-4 Units

## 2024-02-26 NOTE — PROGRESS NOTES
[facility]  Office: [phone]  Baldomero Maldonado DO, Luis Valdivia DO, Eric Rojas DO, Rajinder Chiu DO, Erica Daugherty MD, Carolina Hernandez MD, Haleigh Sorto MD, Hannah Blum MD,  Isael Velazco MD, Cora Coughlin MD, Alex Ferrell MD,  Chela Doan DO, Trent Kearns MD, Manolo Potter MD, Hans Maldonado DO, Mei Tolbert MD,  Gianni De La Rosa DO, Aranza Wakefield MD, Magdalena Dill MD, Mary Ann Cheek MD, Jenny Wililamson MD,  Panfilo Hall MD, Dorcas Gillis MD, Nato Gunter MD, Opal Davis MD, Red Small MD, Giana Larsen MD, Roberth Eason DO, Carmelo Conroy DO, Marj Davis MD,  Joe Franklin MD, Shirley Waterhouse, CNP,  Xena Harrell, CNP, Trenton Campbell, CNP,  Celena Corona, DNP, Terrie Mathur, CNP, Dalia Lopes, CNP, Kelin Hernandez CNP, Chiquita Manuel, CNP, Anh Kolb, CNP, Geetha Gold, PA-C, Rubina Miller, PA-C, Gris Thorpe, CNP, Paige Higgins, CNP, Elizabeth Clemente, CNP, Lea Fletcher, CNS, Maria Esther Silverio, CNP, Norma Soliman, CNP, Tracy Schwab, CNP         [facility]   IN-PATIENT SERVICE   [facility]    Progress Note    2/26/2024    8:16 AM    Name:   Nicolas Cardenas  MRN:     [id number]     Acct:      [id number]   Room:   2012/2012-02  IP Day:  3  Admit Date:  2/23/2024 12:56 PM    PCP:   Jono Vargas APRN - NP  Code Status:  Full Code    Subjective:     C/C:   Chief Complaint   Patient presents with    Wound Infection     Wounds to bilat stumps     Interval History Status: not changed.     Plan for swallow study today was refused, nutritional supplementation underway, plan for discharge to skilled facility once placement can be arranged.  Leukocytosis is improved with hydration, this is likely inflammatory response as well as hemoconcentration from cachexia and dehydration.  No fever, no clear source of systemic infection, cultures negative thus far.  Patient does not present as septic or infectious.    Brief History:  constipation and diarrhea. Negative for abdominal distention, nausea and vomiting.   Endocrine: Negative for cold intolerance, heat intolerance and polyuria.   Genitourinary:  Negative for difficulty urinating, enuresis and urgency.   Musculoskeletal:  Negative for arthralgias and myalgias.   Skin: Negative.  Negative for wound.        Chronic wounds to bilateral stumps   Neurological:  Negative for dizziness, syncope, weakness and light-headedness.   Hematological:  Negative for adenopathy. Does not bruise/bleed easily.   Psychiatric/Behavioral:  Negative for agitation and confusion. The patient is not nervous/anxious.        Medications:     Allergies:    Allergies   Allergen Reactions    Ativan [Lorazepam] Anaphylaxis    Gabapentin Other (See Comments)     dizziness    Other        Current Meds:   Scheduled Meds:    psyllium husk-aspartame  1 packet Oral Daily    aspirin EC  81 mg Oral Daily    atorvastatin  40 mg Oral Nightly    budesonide-formoterol  2 puff Inhalation BID RT    famotidine  20 mg Oral BID    ferrous sulfate  325 mg Oral Daily with breakfast    insulin glargine  12 Units SubCUTAneous Dinner    metoprolol tartrate  25 mg Oral BID    oxyBUTYnin  10 mg Oral Daily    tamsulosin  0.4 mg Oral QAM    sodium chloride flush  5-40 mL IntraVENous 2 times per day    enoxaparin  30 mg SubCUTAneous Daily    insulin lispro  0-16 Units SubCUTAneous TID WC    insulin lispro  0-4 Units SubCUTAneous Nightly    scopolamine  1 patch TransDERmal Q72H     Continuous Infusions:    sodium chloride 50 mL/hr at 02/26/24 0609    sodium chloride      dextrose       PRN Meds: morphine, albuterol sulfate HFA, hydrOXYzine HCl, sodium chloride flush, sodium chloride, potassium chloride **OR** potassium alternative oral replacement **OR** potassium chloride, magnesium sulfate, ondansetron **OR** ondansetron, polyethylene glycol, acetaminophen **OR** acetaminophen, glucose, dextrose bolus **OR** dextrose bolus, glucagon (rDNA),

## 2024-02-27 LAB
GLUCOSE BLD-MCNC: 121 MG/DL (ref 75–110)
GLUCOSE BLD-MCNC: 209 MG/DL (ref 75–110)
GLUCOSE BLD-MCNC: 211 MG/DL (ref 75–110)
GLUCOSE BLD-MCNC: 247 MG/DL (ref 75–110)
GLUCOSE BLD-MCNC: 65 MG/DL (ref 75–110)

## 2024-02-27 PROCEDURE — 94761 N-INVAS EAR/PLS OXIMETRY MLT: CPT

## 2024-02-27 PROCEDURE — 97535 SELF CARE MNGMENT TRAINING: CPT

## 2024-02-27 PROCEDURE — 6360000002 HC RX W HCPCS: Performed by: NURSE PRACTITIONER

## 2024-02-27 PROCEDURE — 97166 OT EVAL MOD COMPLEX 45 MIN: CPT

## 2024-02-27 PROCEDURE — 82947 ASSAY GLUCOSE BLOOD QUANT: CPT

## 2024-02-27 PROCEDURE — 99231 SBSQ HOSP IP/OBS SF/LOW 25: CPT | Performed by: INTERNAL MEDICINE

## 2024-02-27 PROCEDURE — 99232 SBSQ HOSP IP/OBS MODERATE 35: CPT | Performed by: NURSE PRACTITIONER

## 2024-02-27 PROCEDURE — 6370000000 HC RX 637 (ALT 250 FOR IP): Performed by: NURSE PRACTITIONER

## 2024-02-27 PROCEDURE — 94640 AIRWAY INHALATION TREATMENT: CPT

## 2024-02-27 PROCEDURE — 1200000000 HC SEMI PRIVATE

## 2024-02-27 PROCEDURE — 97530 THERAPEUTIC ACTIVITIES: CPT

## 2024-02-27 RX ORDER — LACTOBACILL 46/B.ANIMAL/INULIN 10B-100 MG
1 CAPSULE ORAL DAILY
COMMUNITY

## 2024-02-27 RX ORDER — ENOXAPARIN SODIUM 100 MG/ML
40 INJECTION SUBCUTANEOUS DAILY
Status: DISCONTINUED | OUTPATIENT
Start: 2024-02-28 | End: 2024-03-01 | Stop reason: HOSPADM

## 2024-02-27 RX ORDER — ATORVASTATIN CALCIUM 40 MG/1
40 TABLET, FILM COATED ORAL DAILY
COMMUNITY

## 2024-02-27 RX ORDER — INSULIN GLARGINE 100 [IU]/ML
16 INJECTION, SOLUTION SUBCUTANEOUS 2 TIMES DAILY
COMMUNITY

## 2024-02-27 RX ORDER — LANOLIN ALCOHOL/MO/W.PET/CERES
3 CREAM (GRAM) TOPICAL NIGHTLY
COMMUNITY

## 2024-02-27 RX ORDER — METOPROLOL SUCCINATE 25 MG/1
25 TABLET, EXTENDED RELEASE ORAL DAILY
Status: DISCONTINUED | OUTPATIENT
Start: 2024-02-28 | End: 2024-03-01 | Stop reason: HOSPADM

## 2024-02-27 RX ORDER — FLUTICASONE PROPIONATE AND SALMETEROL 100; 50 UG/1; UG/1
2 POWDER RESPIRATORY (INHALATION) EVERY 12 HOURS
COMMUNITY

## 2024-02-27 RX ORDER — DULAGLUTIDE 1.5 MG/.5ML
0.5 INJECTION, SOLUTION SUBCUTANEOUS WEEKLY
COMMUNITY

## 2024-02-27 RX ORDER — BACITRACIN ZINC, NEOMYCIN, POLYMYXIN B 400; 3.5; 5 [USP'U]/G; MG/G; [USP'U]/G
OINTMENT TOPICAL DAILY
COMMUNITY

## 2024-02-27 RX ORDER — SULFAMETHOXAZOLE AND TRIMETHOPRIM 800; 160 MG/1; MG/1
1 TABLET ORAL 2 TIMES DAILY
COMMUNITY
Start: 2024-02-14

## 2024-02-27 RX ORDER — METOPROLOL SUCCINATE 25 MG/1
25 TABLET, EXTENDED RELEASE ORAL DAILY
COMMUNITY

## 2024-02-27 RX ORDER — AMLODIPINE BESYLATE 5 MG/1
5 TABLET ORAL DAILY
COMMUNITY

## 2024-02-27 RX ORDER — OMEPRAZOLE 20 MG/1
20 CAPSULE, DELAYED RELEASE ORAL
COMMUNITY

## 2024-02-27 RX ORDER — AMLODIPINE BESYLATE 5 MG/1
5 TABLET ORAL DAILY
Status: DISCONTINUED | OUTPATIENT
Start: 2024-02-27 | End: 2024-03-01 | Stop reason: HOSPADM

## 2024-02-27 RX ORDER — ALOGLIPTIN 12.5 MG/1
12.5 TABLET, FILM COATED ORAL DAILY
Status: DISCONTINUED | OUTPATIENT
Start: 2024-02-27 | End: 2024-03-01 | Stop reason: HOSPADM

## 2024-02-27 RX ORDER — DOXEPIN 3 MG/1
3 TABLET, FILM COATED ORAL NIGHTLY PRN
COMMUNITY

## 2024-02-27 RX ADMIN — INSULIN LISPRO 4 UNITS: 100 INJECTION, SOLUTION INTRAVENOUS; SUBCUTANEOUS at 12:05

## 2024-02-27 RX ADMIN — HYDROXYZINE HYDROCHLORIDE 25 MG: 25 TABLET, FILM COATED ORAL at 01:57

## 2024-02-27 RX ADMIN — INSULIN GLARGINE 12 UNITS: 100 INJECTION, SOLUTION SUBCUTANEOUS at 17:26

## 2024-02-27 RX ADMIN — TAMSULOSIN HYDROCHLORIDE 0.4 MG: 0.4 CAPSULE ORAL at 09:38

## 2024-02-27 RX ADMIN — INSULIN LISPRO 4 UNITS: 100 INJECTION, SOLUTION INTRAVENOUS; SUBCUTANEOUS at 17:26

## 2024-02-27 RX ADMIN — ASPIRIN 81 MG: 81 TABLET, COATED ORAL at 09:39

## 2024-02-27 RX ADMIN — FAMOTIDINE 20 MG: 20 TABLET, FILM COATED ORAL at 20:33

## 2024-02-27 RX ADMIN — INSULIN LISPRO 4 UNITS: 100 INJECTION, SOLUTION INTRAVENOUS; SUBCUTANEOUS at 07:56

## 2024-02-27 RX ADMIN — MORPHINE SULFATE 4 MG: 4 INJECTION, SOLUTION INTRAMUSCULAR; INTRAVENOUS at 01:57

## 2024-02-27 RX ADMIN — MORPHINE SULFATE 4 MG: 4 INJECTION, SOLUTION INTRAMUSCULAR; INTRAVENOUS at 12:19

## 2024-02-27 RX ADMIN — ATORVASTATIN CALCIUM 40 MG: 10 TABLET, FILM COATED ORAL at 20:33

## 2024-02-27 RX ADMIN — FERROUS SULFATE TAB EC 325 MG (65 MG FE EQUIVALENT) 325 MG: 325 (65 FE) TABLET DELAYED RESPONSE at 09:38

## 2024-02-27 RX ADMIN — OXYBUTYNIN CHLORIDE 10 MG: 10 TABLET, EXTENDED RELEASE ORAL at 09:38

## 2024-02-27 RX ADMIN — ENOXAPARIN SODIUM 30 MG: 100 INJECTION SUBCUTANEOUS at 09:39

## 2024-02-27 RX ADMIN — HYDROCODONE BITARTRATE AND ACETAMINOPHEN 2 TABLET: 5; 325 TABLET ORAL at 04:51

## 2024-02-27 RX ADMIN — HYDROCODONE BITARTRATE AND ACETAMINOPHEN 2 TABLET: 5; 325 TABLET ORAL at 21:30

## 2024-02-27 RX ADMIN — BUDESONIDE AND FORMOTEROL FUMARATE DIHYDRATE 2 PUFF: 160; 4.5 AEROSOL RESPIRATORY (INHALATION) at 07:47

## 2024-02-27 RX ADMIN — METOPROLOL TARTRATE 25 MG: 25 TABLET, FILM COATED ORAL at 09:37

## 2024-02-27 RX ADMIN — FAMOTIDINE 20 MG: 20 TABLET, FILM COATED ORAL at 09:37

## 2024-02-27 RX ADMIN — HYDROCODONE BITARTRATE AND ACETAMINOPHEN 2 TABLET: 5; 325 TABLET ORAL at 09:43

## 2024-02-27 RX ADMIN — ALOGLIPTIN 12.5 MG: 12.5 TABLET, FILM COATED ORAL at 20:33

## 2024-02-27 RX ADMIN — MORPHINE SULFATE 4 MG: 4 INJECTION, SOLUTION INTRAMUSCULAR; INTRAVENOUS at 06:51

## 2024-02-27 RX ADMIN — HYDROCODONE BITARTRATE AND ACETAMINOPHEN 2 TABLET: 5; 325 TABLET ORAL at 16:06

## 2024-02-27 ASSESSMENT — PAIN SCALES - GENERAL
PAINLEVEL_OUTOF10: 0
PAINLEVEL_OUTOF10: 5
PAINLEVEL_OUTOF10: 7
PAINLEVEL_OUTOF10: 6
PAINLEVEL_OUTOF10: 7
PAINLEVEL_OUTOF10: 7
PAINLEVEL_OUTOF10: 8
PAINLEVEL_OUTOF10: 8
PAINLEVEL_OUTOF10: 7
PAINLEVEL_OUTOF10: 8
PAINLEVEL_OUTOF10: 8
PAINLEVEL_OUTOF10: 6
PAINLEVEL_OUTOF10: 5
PAINLEVEL_OUTOF10: 4

## 2024-02-27 ASSESSMENT — PAIN DESCRIPTION - LOCATION
LOCATION: LEG

## 2024-02-27 ASSESSMENT — PAIN DESCRIPTION - ORIENTATION
ORIENTATION: RIGHT;LEFT

## 2024-02-27 ASSESSMENT — ENCOUNTER SYMPTOMS
GASTROINTESTINAL NEGATIVE: 1
COUGH: 0
SINUS PRESSURE: 0
VOMITING: 0
WHEEZING: 0
CONSTIPATION: 1
SHORTNESS OF BREATH: 1
PHOTOPHOBIA: 0
ABDOMINAL PAIN: 1
RESPIRATORY NEGATIVE: 1
DIARRHEA: 1
SINUS PAIN: 0
NAUSEA: 0
ABDOMINAL DISTENTION: 0

## 2024-02-27 ASSESSMENT — PAIN DESCRIPTION - DESCRIPTORS
DESCRIPTORS: ACHING;DISCOMFORT;SORE
DESCRIPTORS: ACHING;DISCOMFORT;SORE
DESCRIPTORS: ACHING;DISCOMFORT
DESCRIPTORS: ACHING;DISCOMFORT;SORE

## 2024-02-27 ASSESSMENT — PAIN DESCRIPTION - PAIN TYPE: TYPE: ACUTE PAIN

## 2024-02-27 ASSESSMENT — PAIN DESCRIPTION - FREQUENCY: FREQUENCY: CONTINUOUS

## 2024-02-27 ASSESSMENT — PAIN DESCRIPTION - ONSET: ONSET: ON-GOING

## 2024-02-27 NOTE — PLAN OF CARE
Problem: Respiratory - Adult  Goal: Patient's breath sounds will be clear and equal  2/26/2024 1945 by Kevon Ly RCP  Outcome: Progressing     Problem: Respiratory - Adult  Goal: Able to breathe comfortably  2/26/2024 1945 by Kevon Ly RCP  Outcome: Progressing

## 2024-02-27 NOTE — PROGRESS NOTES
Patient was very rude and degrading the the PCT as she was trying to help him on the bedpan.  RN will go and talk to him.

## 2024-02-27 NOTE — CARE COORDINATION
Social work: called from [facility] their [facility] stopped the admission due to large bill owed their company when he refused to leave one of their buildings in the past. So no [facility] snf will consider this pt for admission. [facility] had accepted pt but daughter preferred [facility]. Will advise her of the denial and offer the same snf who had accepted pt to see if she will accept that now. Will need philip and Rx at discharge. Celena camacho    Social work: called to advise daughter of denied snf and she insisted [facility] would take him. She did not seem to appreciate that a precert would be needed and they might not have an open bed. Will continue to work on referral to [facility]. But [facility] still may accept she wants the other tried first. Celena camacho    Social work; spoke to liaison Deonna she is aware of the referral and her staff is reviewing now. Daughter called again to advise that she has been told they will accept. Not sure yet if they will and not sure if insurance will provide a precert. Await those two issues to resolve. Sent PT/OT NOTES to help with precert. Pt did cooperate with therapy today.  Celena camacho

## 2024-02-27 NOTE — PROGRESS NOTES
Occupational Therapy  Facility/Department: [facility] MED SURG  Occupational Therapy Initial Assessment    Name: Nicolas Cardenas  : 1955  MRN: [id number]  Date of Service: 2024    HA Rhodes reports patient is medically stable for therapy treatment this date.    Chart reviewed prior to treatment and patient is agreeable for therapy.  All lines intact and patient positioned comfortably at end of treatment.  All patient needs addressed prior to ending therapy session.       Pt currently functioning below baseline.  Recommend daily inpatient skilled therapy at time of discharge to maximize long term outcomes and prevent re-admission. Please refer to AM-PAC score for current level of function.     Discharge Recommendations:  Patient would benefit from continued therapy after discharge  OT Equipment Recommendations  Equipment Needed:  (CTA)       Per H&P: Patient presents to the ED with bilateral wound to his bilateral BKAs. Patient has a past medical history of bilateral BKAs, CKD, COPD, diabetes, GERD, hyperlipidemia, hypertension, seizures and hypothyroidism. Patient states that he has been dealing with diarrhea for the past 2 years. He states that any time he eats, he then has to have a bowel movement soon after. Patient also reports nausea and vomiting each morning. Patient states that he has gotten these wounds to his bilateral lower extremities due to his prosthesis not fitting correctly. Patient states that he had been prescribed oral antibiotics prior to coming to the ED but states that he was unable to swallow them due to him having a esophageal cancer history. Patient was encouraged to come to the ED today by his meals on wheels . She had seen him and stated that he had not been looking well. Patient denies any fevers, chest pain and constipation.      Sodium 131. Glucose 325. WBC 27.1. Hemoglobin 12.6. Platelets 629.      Patient being admitted for failure to thrive with bilateral wounds  damien Wright, OT

## 2024-02-27 NOTE — PROGRESS NOTES
[facility]  Office: [phone]  Baldomero Maldonado DO, Luis Valdivia DO, Eric Rojas DO, Rajinder Chiu DO, Erica Daugherty MD, Carolina Hernandez MD, Haleigh Sorto MD, Hannah Blum MD,  Isael Velazco MD, Cora Coughlin MD, Alex Ferrell MD,  Chela Doan DO, Trent Kearns MD, Manolo Potter MD, Hans Maldonado DO, Mei Tolbert MD,  Gianni De La Rosa DO, Aranza Wakefield MD, Magdalena Dill MD, Mary Ann Cheek MD, Jenny Williamson MD,  Panfilo Hall MD, Dorcas Gillis MD, Nato Gunter MD, Opal Davis MD, Red Small MD, Giana Larsen MD, Roberth Eason DO, Carmelo Conroy DO, Marj Davis MD,  Joe Franklin MD, Shirley Waterhouse, CNP,  Xena Harrell, CNP, Trenton Campbell, CNP,  Celena Corona, DNP, Terrie Mathur, CNP, Dalia Lopes, CNP, Kelin Hernandez CNP, Chiquita Manuel, CNP, Anh Kolb, CNP, Geetha Gold, PA-C, Rubina Miller, PA-C, Gris Thorpe, CNP, Paige Higgins, CNP, Elizabeth Clemente, CNP, Lea Fletcher, CNS, Maria Esther Silverio, CNP, Norma Soliman, CNP, Tracy Schwab, CNP         [facility]   IN-PATIENT SERVICE   [facility]    Progress Note    2/27/2024    11:43 AM    Name:   Nicolas Cardenas  MRN:     [id number]     Acct:      [id number]   Room:   2012/2012-02  IP Day:  4  Admit Date:  2/23/2024 12:56 PM    PCP:   Jono Vargas APRN - NP  Code Status:  Full Code    Subjective:     C/C:   Chief Complaint   Patient presents with    Wound Infection     Wounds to bilat stumps     Interval History Status: not changed.     Plan for discharge once placement can be arranged    Brief History:     2/23 - Per previous documentation  Patient presents to the ED with bilateral wound to his bilateral BKAs. Patient has a past medical history of bilateral BKAs, CKD, COPD, diabetes, GERD, hyperlipidemia, hypertension, seizures and hypothyroidism. Patient states that he has been dealing with diarrhea for the past 2 years. He states that any time he eats, he then has

## 2024-02-27 NOTE — PROGRESS NOTES
Physical Therapy  Facility/Department: [facility] MED SURG  Daily Treatment Note  NAME: Nicolas Cardenas  : 1955  MRN: [id number]    Date of Service: 2024    HA Rhodes reports patient is medically stable for therapy treatment this date.    Chart reviewed prior to treatment and patient is agreeable for therapy.  All lines intact and patient positioned comfortably at end of treatment.  All patient needs addressed prior to ending therapy session.     Discharge Recommendations:  Patient would benefit from continued therapy after discharge   Pt currently functioning below baseline.  Recommend daily inpatient skilled therapy at time of discharge to maximize long term outcomes and prevent re-admission. Please refer to AM-PAC score for current level of function.   PT Equipment Recommendations  Equipment Needed: No    Patient Diagnosis(es): The primary encounter diagnosis was Hyperglycemia. Diagnoses of Chronic diarrhea, Failure to thrive in adult, Dehydration, Leg ulcer, left, limited to breakdown of skin (HCC), and Wound of left lower extremity, sequela were also pertinent to this visit.    Assessment   Assessment: 67 y/o male referred to PT. Patient required education on benefits of PT and maximal encouragement to participate. Patient declined attempting transfers from EOB<>w/c.  Patient demonstrating bed mobility with SBA. Patient sat EOB unsupported with UE support. Educated patient on repositioning and weight shifting to maintain skin integrity and prevent sores. Therapist assist with changing soiled linens to promote good skin integrity. Initially, patient reporting he does not want them changed and then with education patient agreebale to linens changed. Patient is functioning below baseline and would benefit from continued PT.  Activity Tolerance: Treatment limited secondary to agitation  Equipment Needed: No     Plan    Physical Therapy Plan  General Plan: 3-5 times per week  Specific Instructions for Next  Treatment: take w/c and try bed to w/c transfer (face to face)  and w/c mobility  Current Treatment Recommendations: Strengthening;Balance training;Home exercise program;Safety education & training;Patient/Caregiver education & training;Therapeutic activities;Positioning;Wheelchair mobility training;Transfer training     Restrictions  Restrictions/Precautions  Restrictions/Precautions: Fall Risk, Contact Precautions, Bed Alarm  Required Braces or Orthoses?: No  Position Activity Restriction  Other position/activity restrictions: Up w/ assist, elevate affected limb, dysphagia diet, hx B BKA     Subjective    Subjective  Subjective: Patient supine in bed with RN at bedside. RN Carmelo srinivasan patient for PT. Patient required maximal encouragement to participate.  Orientation  Overall Orientation Status: Within Functional Limits  Cognition  Overall Cognitive Status: Exceptions  Arousal/Alertness: Appropriate responses to stimuli  Following Commands: Follows one step commands with repetition;Follows one step commands with increased time;Inconsistently follows commands  Attention Span: Attends with cues to redirect  Safety Judgement: Decreased awareness of need for safety;Decreased awareness of need for assistance  Problem Solving: Decreased awareness of errors;Assistance required to identify errors made;Assistance required to correct errors made  Insights: Decreased awareness of deficits  Initiation: Requires cues for some  Sequencing: Requires cues for some  Cognition Comment: Impulsive and agitated behaviors; Maximal encouragement to participate     Objective      Bed Mobility Training  Bed Mobility Training: Yes  Overall Level of Assistance: Stand-by assistance;Additional time  Interventions: Safety awareness training;Verbal cues for sequencing to promote offloading bony prominences  Rolling: Independent (use of bedrail)  Supine to Sit: Stand-by assistance;Additional time (HOB flat; use of bedrail))  Sit to Supine:

## 2024-02-27 NOTE — PROGRESS NOTES
[facility]  Progress Note    Nicolas Cardenas  MRN: [id number]  Date: 2/27/2024  LOS: 4     Reason for F/U :   Multiple wounds    Impression :   Diabetes mellitus type 2 with multiple complications including neuropathy  Bilateral below the knee amputations  Bilateral below the knee stump wounds worse on the left where there is an open wound with some slough at the wound base  COPD    Recommendations:   The left lateral knee wound is related to pressure from his prosthesis which he is no longer wearing  At this point in time he does not have any signs of active infection there is slough in the wound base and the plan is to treat this with alginate  From an infectious disease standpoint the patient is okay for discharge on local wound care without systemic antibiotic therapy    Infection Control Recommendations:   Universal precautions    Discharge Planning:   Patient will need Midline Catheter Insertion/ PICC line Insertion: No  Patient will need: Home IV , Infusion Center,  SNF,  LTAC: Undetermined  Patient willneed outpatient wound care: No    Medical Decision making / Summary of Stay:   Nicolas Cardenas is a 68 y.o.-year-old male who was initially admitted on 2/23/2024.   Mo has multiple medical problems including diabetes mellitus type 2, hypertension, COPD, nephrolithiasis, chronic kidney disease, liver disease, history of pulmonary embolism and esophageal cancer.  He has had multiple issues with wounds in his lower extremities and has subsequently had below the knee amputations bilaterally.  The patient has had chronic diarrhea issues and has been treated for C. difficile in the past as well as yersiniosis.     The patient was brought to the emergency room due to left leg pain, flank pain and inability to care for himself.  The patient was brought in by a friend who thought he was looking unwell and thought of the left leg looked infected and she called 911.  The patient lives alone he

## 2024-02-27 NOTE — PROGRESS NOTES
have been completed on 2/24/24  Patient daughter stated that her dad did not have a bottle of apixaban (ELIQUIS) 5 MG TABS tablet and she don't think that he actual started taking it. Last filled 4/14/2023  I could not verify that patient ever filled dulaglutide (TRULICITY) 1.5 MG/0.5ML SC injection  I was able to verify that amLODIPine (NORVASC) 5 MG tablet was last filled 4/3/23           Please feel free to call me with any questions about this encounter. Thank you.    This note will be reviewed and co-signed by the Transitions of Care Pharmacist. The pharmacist will review inpatient orders and contact the physician about any discrepancies.    Tomeka Brannon, pharmacy technician  [facility] [place] [facility]  Phone:  [phone]  Fax: [phone]      Electronically signed by Tomeka Brannon on 2/27/2024 at 11:09 AM       Prior to Admission medications    Medication Sig   amLODIPine (NORVASC) 5 MG tablet Take 1 tablet by mouth daily   fluticasone-salmeterol (ADVAIR) 100-50 MCG/ACT AEPB diskus inhaler Inhale 2 puffs into the lungs in the morning and 2 puffs in the evening.   atorvastatin (LIPITOR) 40 MG tablet Take 1 tablet by mouth daily   sulfamethoxazole-trimethoprim (BACTRIM DS;SEPTRA DS) 800-160 MG per tablet Take 1 tablet by mouth 2 times daily   For 10 days started 2/14/24 ended 2/24/24   insulin glargine (BASAGLAR KWIKPEN) 100 UNIT/ML injection pen Inject 16 Units into the skin 2 times daily   doxepin (SILENOR) 3 MG TABS tablet Take 1 tablet by mouth nightly as needed (for sleep)   apixaban (ELIQUIS) 5 MG TABS tablet Take 1 tablet by mouth 2 times daily   SITagliptin (JANUVIA) 50 MG tablet Take 1 tablet by mouth daily   melatonin 3 MG TABS tablet Take 1 tablet by mouth at bedtime   metoprolol succinate (TOPROL XL) 25 MG extended release tablet Take 1 tablet by mouth daily   omeprazole (PRILOSEC) 20 MG delayed release capsule Take 1 capsule by mouth every morning (before breakfast)   Probiotic  Product (ADVANCED PROBIOTIC 10) CAPS Take 1 capsule by mouth daily   Neomycin-Bacitracin-Polymyxin (TRIPLE ANTIBIOTIC) 3.5-400-5000 MG-UNIT OINT Apply topically daily   dulaglutide (TRULICITY) 1.5 MG/0.5ML SC injection Inject 0.5 mg into the skin once a week (For 12 weeks)     ferrous sulfate (IRON 325) 325 (65 Fe) MG tablet Take 1 tablet by mouth daily (with breakfast)   albuterol sulfate HFA (VENTOLIN HFA) 108 (90 Base) MCG/ACT inhaler Inhale 2 puffs into the lungs every 4 hours as needed for Wheezing   famotidine (PEPCID) 20 MG tablet Take 1 tablet by mouth 2 times daily as needed (for reflux)     aspirin EC 81 MG EC tablet Take 1 tablet by mouth daily

## 2024-02-27 NOTE — PROGRESS NOTES
Changed the dressings on patients legs.  Cleaned with soap and water and dressings placed.  Patient tolerated it well.  Plan of care is ongoing.

## 2024-02-27 NOTE — PLAN OF CARE
Patient came in with failure to thrive.  He is a bilateral BKA.  He has sores on his legs. Wound care is on board.  He lives alone and struggles to take care of himself. NO other complaints at this time.  Plan of care is ongoing.    Pain level assessment complete.   Patient educated on pain scale and control interventions  PRN pain medication given per patient request  Patient instructed to call out with new onset of pain or unrelieved pain    Problem: Pain  Goal: Verbalizes/displays adequate comfort level or baseline comfort level  Outcome: Progressing     Checked for incontinence every 2 hours and prn.  Pericare as needed.  Assisted to reposition off back frequently.  On waffle mattress.  Heels off bed with pillows.    Problem: Skin/Tissue Integrity  Goal: Absence of new skin breakdown  Description: 1.  Monitor for areas of redness and/or skin breakdown  2.  Assess vascular access sites hourly  3.  Every 4-6 hours minimum:  Change oxygen saturation probe site  4.  Every 4-6 hours:  If on nasal continuous positive airway pressure, respiratory therapy assess nares and determine need for appliance change or resting period.  Outcome: Progressing     Siderails up x 2  Hourly rounding  Call light in reach  Instructed to call for assist before attempting out of bed.  Remains free from falls and accidental injury at this time   Floor free from obstacles  Bed is locked and in lowest position  Adequate lighting provided  Bed alarm on, Red Falling star and Stay with Me signs posted     Problem: Safety - Adult  Goal: Free from fall injury  Outcome: Progressing     Problem: ABCDS Injury Assessment  Goal: Absence of physical injury  Outcome: Progressing     Problem: Respiratory - Adult  Goal: Patient's breath sounds will be clear and equal  2/26/2024 1945 by Kevon Ly RCP  Outcome: Progressing  Goal: Able to breathe comfortably  2/26/2024 1945 by Kevon Ly RCP  Outcome: Progressing     Problem: Neurosensory - Adult  Goal:  adequate hydration   Nasogastric tube to low intermittent suction as ordered  Goal: Maintains or returns to baseline bowel function  Outcome: Progressing  Flowsheets (Taken 2/26/2024 1914)  Maintains or returns to baseline bowel function:   Assess bowel function   Encourage oral fluids to ensure adequate hydration   Administer IV fluids as ordered to ensure adequate hydration  Goal: Maintains adequate nutritional intake  Outcome: Progressing  Flowsheets (Taken 2/26/2024 1914)  Maintains adequate nutritional intake:   Monitor percentage of each meal consumed   Identify factors contributing to decreased intake, treat as appropriate   Assist with meals as needed  Goal: Establish and maintain optimal ostomy function  Outcome: Progressing  Flowsheets (Taken 2/26/2024 1914)  Establish and maintain optimal ostomy function:   Monitor output from ostomies   Introduce and advance enteral feedings as ordered   Administer IV fluids and TPN as ordered     Problem: Infection - Adult  Goal: Absence of infection at discharge  Outcome: Progressing  Flowsheets (Taken 2/26/2024 1914)  Absence of infection at discharge:   Assess and monitor for signs and symptoms of infection   Monitor lab/diagnostic results   Monitor all insertion sites i.e., indwelling lines, tubes and drains  Goal: Absence of infection during hospitalization  Outcome: Progressing  Flowsheets (Taken 2/26/2024 1914)  Absence of infection during hospitalization:   Assess and monitor for signs and symptoms of infection   Monitor all insertion sites i.e., indwelling lines, tubes and drains   Monitor lab/diagnostic results  Goal: Absence of fever/infection during anticipated neutropenic period  Outcome: Progressing  Flowsheets (Taken 2/26/2024 1914)  Absence of fever/infection during anticipated neutropenic period: Monitor white blood cell count     Problem: Metabolic/Fluid and Electrolytes - Adult  Goal: Electrolytes maintained within normal limits  Outcome:

## 2024-02-27 NOTE — CARE COORDINATION
[facility] Quality Flow/Interdisciplinary Rounds Progress Note    Quality Flow Rounds held on February 27, 2024 at 0930    Disciplines Attending:  Bedside Nurse, , , and Nursing Unit Leadership    Barriers to Discharge: Insurance authorization    Anticipated Discharge Date:   2/28/24    Anticipated Discharge Disposition: SNF    Readmission Risk              Risk of Unplanned Readmission:  43           Discussed patient goal for the day, patient clinical progression, and barriers to discharge. LSW reports patient accepted to [facility]. Updated PT/OT notes needed for insurance precert to be initiated.      Deann Quintana RN  February 27, 2024

## 2024-02-28 LAB
GLUCOSE BLD-MCNC: 178 MG/DL (ref 75–110)
GLUCOSE BLD-MCNC: 198 MG/DL (ref 75–110)
GLUCOSE BLD-MCNC: 204 MG/DL (ref 75–110)
GLUCOSE BLD-MCNC: 221 MG/DL (ref 75–110)
GLUCOSE BLD-MCNC: 237 MG/DL (ref 75–110)
MICROORGANISM SPEC CULT: NORMAL
MICROORGANISM SPEC CULT: NORMAL
SERVICE CMNT-IMP: NORMAL
SERVICE CMNT-IMP: NORMAL
SPECIMEN DESCRIPTION: NORMAL
SPECIMEN DESCRIPTION: NORMAL

## 2024-02-28 PROCEDURE — 6360000002 HC RX W HCPCS: Performed by: NURSE PRACTITIONER

## 2024-02-28 PROCEDURE — 6370000000 HC RX 637 (ALT 250 FOR IP): Performed by: NURSE PRACTITIONER

## 2024-02-28 PROCEDURE — 94761 N-INVAS EAR/PLS OXIMETRY MLT: CPT

## 2024-02-28 PROCEDURE — 97535 SELF CARE MNGMENT TRAINING: CPT

## 2024-02-28 PROCEDURE — 94640 AIRWAY INHALATION TREATMENT: CPT

## 2024-02-28 PROCEDURE — 1200000000 HC SEMI PRIVATE

## 2024-02-28 PROCEDURE — 99232 SBSQ HOSP IP/OBS MODERATE 35: CPT | Performed by: NURSE PRACTITIONER

## 2024-02-28 PROCEDURE — 97530 THERAPEUTIC ACTIVITIES: CPT

## 2024-02-28 RX ADMIN — TAMSULOSIN HYDROCHLORIDE 0.4 MG: 0.4 CAPSULE ORAL at 08:41

## 2024-02-28 RX ADMIN — BUDESONIDE AND FORMOTEROL FUMARATE DIHYDRATE 2 PUFF: 160; 4.5 AEROSOL RESPIRATORY (INHALATION) at 08:07

## 2024-02-28 RX ADMIN — MORPHINE SULFATE 4 MG: 4 INJECTION, SOLUTION INTRAMUSCULAR; INTRAVENOUS at 14:27

## 2024-02-28 RX ADMIN — FAMOTIDINE 20 MG: 20 TABLET, FILM COATED ORAL at 21:09

## 2024-02-28 RX ADMIN — HYDROCODONE BITARTRATE AND ACETAMINOPHEN 2 TABLET: 5; 325 TABLET ORAL at 17:00

## 2024-02-28 RX ADMIN — MORPHINE SULFATE 4 MG: 4 INJECTION, SOLUTION INTRAMUSCULAR; INTRAVENOUS at 08:38

## 2024-02-28 RX ADMIN — POLYETHYLENE GLYCOL 3350 17 G: 17 POWDER, FOR SOLUTION ORAL at 14:35

## 2024-02-28 RX ADMIN — MORPHINE SULFATE 4 MG: 4 INJECTION, SOLUTION INTRAMUSCULAR; INTRAVENOUS at 23:28

## 2024-02-28 RX ADMIN — INSULIN LISPRO 4 UNITS: 100 INJECTION, SOLUTION INTRAVENOUS; SUBCUTANEOUS at 14:27

## 2024-02-28 RX ADMIN — METOPROLOL SUCCINATE 25 MG: 25 TABLET, EXTENDED RELEASE ORAL at 08:41

## 2024-02-28 RX ADMIN — INSULIN LISPRO 4 UNITS: 100 INJECTION, SOLUTION INTRAVENOUS; SUBCUTANEOUS at 16:56

## 2024-02-28 RX ADMIN — ASPIRIN 81 MG: 81 TABLET, COATED ORAL at 08:41

## 2024-02-28 RX ADMIN — FERROUS SULFATE TAB EC 325 MG (65 MG FE EQUIVALENT) 325 MG: 325 (65 FE) TABLET DELAYED RESPONSE at 08:41

## 2024-02-28 RX ADMIN — HYDROCODONE BITARTRATE AND ACETAMINOPHEN 2 TABLET: 5; 325 TABLET ORAL at 05:31

## 2024-02-28 RX ADMIN — INSULIN GLARGINE 12 UNITS: 100 INJECTION, SOLUTION SUBCUTANEOUS at 16:56

## 2024-02-28 RX ADMIN — OXYBUTYNIN CHLORIDE 10 MG: 10 TABLET, EXTENDED RELEASE ORAL at 08:41

## 2024-02-28 RX ADMIN — AMLODIPINE BESYLATE 5 MG: 5 TABLET ORAL at 08:41

## 2024-02-28 RX ADMIN — ATORVASTATIN CALCIUM 40 MG: 10 TABLET, FILM COATED ORAL at 21:09

## 2024-02-28 RX ADMIN — BUDESONIDE AND FORMOTEROL FUMARATE DIHYDRATE 2 PUFF: 160; 4.5 AEROSOL RESPIRATORY (INHALATION) at 19:58

## 2024-02-28 RX ADMIN — FAMOTIDINE 20 MG: 20 TABLET, FILM COATED ORAL at 08:41

## 2024-02-28 RX ADMIN — ALOGLIPTIN 12.5 MG: 12.5 TABLET, FILM COATED ORAL at 08:41

## 2024-02-28 RX ADMIN — ENOXAPARIN SODIUM 40 MG: 100 INJECTION SUBCUTANEOUS at 08:40

## 2024-02-28 RX ADMIN — HYDROCODONE BITARTRATE AND ACETAMINOPHEN 2 TABLET: 5; 325 TABLET ORAL at 10:46

## 2024-02-28 ASSESSMENT — ENCOUNTER SYMPTOMS
SINUS PAIN: 0
SHORTNESS OF BREATH: 1
DIARRHEA: 1
NAUSEA: 0
COUGH: 0
CONSTIPATION: 1
VOMITING: 0
ABDOMINAL PAIN: 1
PHOTOPHOBIA: 0
SINUS PRESSURE: 0
ABDOMINAL DISTENTION: 0
WHEEZING: 0

## 2024-02-28 ASSESSMENT — PAIN SCALES - GENERAL
PAINLEVEL_OUTOF10: 0
PAINLEVEL_OUTOF10: 6
PAINLEVEL_OUTOF10: 5
PAINLEVEL_OUTOF10: 9
PAINLEVEL_OUTOF10: 8
PAINLEVEL_OUTOF10: 8
PAINLEVEL_OUTOF10: 10
PAINLEVEL_OUTOF10: 5
PAINLEVEL_OUTOF10: 8
PAINLEVEL_OUTOF10: 10

## 2024-02-28 ASSESSMENT — PAIN DESCRIPTION - DESCRIPTORS
DESCRIPTORS: SORE;SHARP
DESCRIPTORS: SHARP

## 2024-02-28 ASSESSMENT — PAIN DESCRIPTION - LOCATION
LOCATION: GENERALIZED
LOCATION: LEG

## 2024-02-28 NOTE — CARE COORDINATION
Social work: daughter did call back twice but advised she was trying to speak to someone she knew at [facility] who has declined the referral.   Asked daughter is she cannot change their mind if she would provide another snf choice.   Await that decision. Will need philip and Rx at discharge.   Celena camacho

## 2024-02-28 NOTE — PROGRESS NOTES
Writer arrived into patient's room and patient was lethargic and confused. Patient's blood sugar was checked and it was 65. Orange juice was given to patient.

## 2024-02-28 NOTE — PLAN OF CARE
Problem: Discharge Planning  Goal: Discharge to home or other facility with appropriate resources  Outcome: Progressing  Flowsheets (Taken 2/27/2024 2000)  Discharge to home or other facility with appropriate resources: Identify barriers to discharge with patient and caregiver     Problem: Pain  Goal: Verbalizes/displays adequate comfort level or baseline comfort level  2/28/2024 0435 by Harish Valencia RN  Outcome: Progressing  2/27/2024 1934 by Carmelo Forte RN  Flowsheets (Taken 2/27/2024 1934)  Verbalizes/displays adequate comfort level or baseline comfort level:   Encourage patient to monitor pain and request assistance   Assess pain using appropriate pain scale   Administer analgesics based on type and severity of pain and evaluate response   Implement non-pharmacological measures as appropriate and evaluate response  Note: Patient admitted with stump wound infection. Patient c/o pain rates at 7-8 on pain scale. Patient taking PRn Norco and Morphine for pain control. Patient states medication is effective for pain control     Problem: Skin/Tissue Integrity  Goal: Absence of new skin breakdown  Description: 1.  Monitor for areas of redness and/or skin breakdown  2.  Assess vascular access sites hourly  3.  Every 4-6 hours minimum:  Change oxygen saturation probe site  4.  Every 4-6 hours:  If on nasal continuous positive airway pressure, respiratory therapy assess nares and determine need for appliance change or resting period.  Outcome: Progressing     Problem: Safety - Adult  Goal: Free from fall injury  Outcome: Progressing     Problem: ABCDS Injury Assessment  Goal: Absence of physical injury  Outcome: Progressing     Problem: Respiratory - Adult  Goal: Patient's breath sounds will be clear and equal  2/27/2024 1946 by Kevon Ly RCP  Outcome: Progressing  Goal: Able to breathe comfortably  2/27/2024 1946 by Kevon Ly RCP  Outcome: Progressing     Problem: Chronic Conditions and  Progressing  Flowsheets (Taken 2/27/2024 2000)  Skin Integrity Remains Intact: Monitor for areas of redness and/or skin breakdown  Goal: Incisions, wounds, or drain sites healing without S/S of infection  Outcome: Progressing  Goal: Oral mucous membranes remain intact  Outcome: Progressing     Problem: Nutrition Deficit:  Goal: Optimize nutritional status  Outcome: Progressing

## 2024-02-28 NOTE — PROGRESS NOTES
Occupational Therapy  Facility/Department: [facility] MED SUR[facility]  Daily Treatment Note  NAME: Nicolas Cardenas  : 1955  MRN: [id number]    Date of Service: 2024    RN Kenyatta reports patient is medically stable for therapy treatment this date.    Chart reviewed prior to treatment and patient is agreeable for therapy.  All lines intact and patient positioned comfortably at end of treatment.  All patient needs addressed prior to ending therapy session.     Pt currently functioning below baseline.  Recommend daily inpatient skilled therapy at time of discharge to maximize long term outcomes and prevent re-admission. Please refer to AM-PAC score for current level of function.     Discharge Recommendations:  Patient would benefit from continued therapy after discharge  OT Equipment Recommendations  Equipment Needed:  (CTA)      Patient Diagnosis(es): The primary encounter diagnosis was Hyperglycemia. Diagnoses of Chronic diarrhea, Failure to thrive in adult, Dehydration, Leg ulcer, left, limited to breakdown of skin (HCC), and Wound of left lower extremity, sequela were also pertinent to this visit.     Assessment    Assessment: Pt tolerated session Poorly, was most limited by agitation and decreased functional activity tolerance. Pt sat at EOB x 5 minutes with much encouragement to work on seated balance and to change gowns, as pt's gown was soiled upon entry to room. Pt needing SBA for bed mobility and pt continues to demonstrate impulsivity with mobility. Refused transfer training again this session d/t \"my doctors not wanting me to put weight through my legs\" and was unreceptive to all staff's attempts to educate pt on seated transfer tech w/o the need to don prosthetics. Continued skilled OT services are indicated at this time to maximize this pt's safety and IND with self care tasks and to facilitate safe return to PLOF as able.  Activity Tolerance: Treatment limited secondary to decreased cognition;Treatment  during session d/t agitation.    AM-PAC - ADL  AM-PAC Daily Activity - Inpatient   How much help is needed for putting on and taking off regular lower body clothing?: A Lot  How much help is needed for bathing (which includes washing, rinsing, drying)?: A Lot  How much help is needed for toileting (which includes using toilet, bedpan, or urinal)?: A Lot  How much help is needed for putting on and taking off regular upper body clothing?: A Little  How much help is needed for taking care of personal grooming?: A Little  How much help for eating meals?: A Little  AM-[facility] Inpatient Daily Activity Raw Score: 15  AM-PAC Inpatient ADL T-Scale Score : 34.69  ADL Inpatient CMS 0-100% Score: 56.46  ADL Inpatient CMS G-Code Modifier : CK    Therapy Time   Individual Concurrent Group Co-treatment   Time In 1042         Time Out 1105         Minutes 23                 Pearl Wright, OT

## 2024-02-28 NOTE — PROGRESS NOTES
[facility]  Office: [phone]  Baldomero Maldonado DO, Luis Valdivia DO, Eric Rojas DO, Rajinder Chiu DO, Erica Daugherty MD, Carolina Hernandez MD, Haleigh Sorto MD, Hannah Blum MD,  Isael Velazco MD, Cora Coughlin MD, Alex Ferrell MD,  Chela Doan DO, Trent Kearns MD, Manolo Potter MD, Hans Maldonado DO, Mei Tolbert MD,  Gianni De La Rosa DO, Aranza Wakefield MD, Magdalena Dill MD, Mary Ann Cheek MD, Jenny Williamson MD,  Panfilo Hall MD, Dorcas Gillis MD, Nato Gunter MD, Opal Davis MD, Red Small MD, Giana Larsen MD, Roberth Eason DO, Carmelo Conroy DO, Marj Davis MD,  Joe Franklin MD, Shirley Waterhouse, CNP,  Xena Harrell, CNP, Trenton Campbell, CNP,  Celena Corona, DNP, Terrie Mathur, CNP, Dalia Lopes, CNP, Kelin Hernandez CNP, Chiquita Manuel, CNP, Anh Kolb, CNP, Geetha Gold, PA-C, Rubina Miller, PA-C, Gris Thorpe, CNP, Paige Higgins, CNP, Elizabeth Clemente, CNP, Lea Fletcher, CNS, Maria Esther Silverio, CNP, Norma Soliman, CNP, Tracy Schwab, CNP         [facility]   IN-PATIENT SERVICE   [facility]    Progress Note    2/28/2024    10:32 AM    Name:   Nicolas Cardenas  MRN:     [id number]     Acct:      [id number]   Room:   2012/2012-02  IP Day:  5  Admit Date:  2/23/2024 12:56 PM    PCP:   Jono Vargas APRN - NP  Code Status:  Full Code    Subjective:     C/C:   Chief Complaint   Patient presents with    Wound Infection     Wounds to bilat stumps     Interval History Status: not changed.     Plan for discharge once placement can be arranged    Brief History:     2/23 - Per previous documentation  Patient presents to the ED with bilateral wound to his bilateral BKAs. Patient has a past medical history of bilateral BKAs, CKD, COPD, diabetes, GERD, hyperlipidemia, hypertension, seizures and hypothyroidism. Patient states that he has been dealing with diarrhea for the past 2 years. He states that any time he eats, he then has  a wound to his left lower extremity  Wound care to continue as an outpatient  Adaptic with Silver alginate to change daily per vascular  ID discontinued antibiotics, no objection from IM  Hypertension, GERD, COPD  Continue metoprolol with holding parameters  Continue PPI  Continue daily inhalers and as needed albuterol    Trenton Campbell, MARCELO - NP  2/28/2024  10:32 AM

## 2024-02-28 NOTE — PLAN OF CARE
Problem: Pain  Goal: Verbalizes/displays adequate comfort level or baseline comfort level  Flowsheets (Taken 2/27/2024 1934)  Verbalizes/displays adequate comfort level or baseline comfort level:   Encourage patient to monitor pain and request assistance   Assess pain using appropriate pain scale   Administer analgesics based on type and severity of pain and evaluate response   Implement non-pharmacological measures as appropriate and evaluate response  Note: Patient admitted with stump wound infection. Patient c/o pain rates at 7-8 on pain scale. Patient taking PRn Norco and Morphine for pain control. Patient states medication is effective for pain control

## 2024-02-28 NOTE — CARE COORDINATION
Social work: spoke to daughter to advise that this morning Deonna from [facility] did an on site to see if they could take pt. They declined the referral.  Asked daughter for more snf choices. She stated she would return the call. Celena camacho
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
## 2024-02-28 NOTE — PLAN OF CARE
Problem: Respiratory - Adult  Goal: Patient's breath sounds will be clear and equal  Outcome: Progressing     Problem: Respiratory - Adult  Goal: Able to breathe comfortably  Outcome: Progressing

## 2024-02-28 NOTE — CARE COORDINATION
Social work: called daughter to see what snf she settled on for next attempt. Daughter directed ny arreola to call pt and work directly with  him for snf decisions. Tried to call and no answer today. Will try again in the morning. Celena camacho

## 2024-02-28 NOTE — PLAN OF CARE
Adult  Goal: Achieves stable or improved neurological status  2/28/2024 0435 by Harish Valencia RN  Outcome: Progressing  Flowsheets (Taken 2/27/2024 2000)  Achieves stable or improved neurological status: Assess for and report changes in neurological status  Goal: Absence of seizures  2/28/2024 0435 by Harish Valencia RN  Outcome: Progressing  Goal: Remains free of injury related to seizures activity  2/28/2024 0435 by Harish Valencia RN  Outcome: Progressing  Goal: Achieves maximal functionality and self care  2/28/2024 0435 by Harish Valencia RN  Outcome: Progressing     Problem: Musculoskeletal - Adult  Goal: Return mobility to safest level of function  2/28/2024 0435 by Harish Valencia RN  Outcome: Progressing  Flowsheets (Taken 2/27/2024 2000)  Return Mobility to Safest Level of Function: Assess patient stability and activity tolerance for standing, transferring and ambulating with or without assistive devices  Goal: Maintain proper alignment of affected body part  2/28/2024 0435 by Harish Valencia RN  Outcome: Progressing  Flowsheets (Taken 2/27/2024 2000)  Maintain proper alignment of affected body part: Support and protect limb and body alignment per provider's orders  Goal: Return ADL status to a safe level of function  2/28/2024 0435 by Harish Valencia RN  Outcome: Progressing  Flowsheets (Taken 2/27/2024 2000)  Return ADL Status to a Safe Level of Function: Administer medication as ordered     Problem: Gastrointestinal - Adult  Goal: Minimal or absence of nausea and vomiting  2/28/2024 0435 by Harish Valencia RN  Outcome: Progressing  Goal: Maintains or returns to baseline bowel function  2/28/2024 0435 by Harish Valencia RN  Outcome: Progressing  Goal: Maintains adequate nutritional intake  2/28/2024 0435 by Harish Valencia RN  Outcome: Progressing  Goal: Establish and maintain optimal ostomy function  2/28/2024 0435 by Harish Valencia RN  Outcome: Progressing     Problem: Infection - Adult  Goal: Absence of infection at  Intact: Monitor for areas of redness and/or skin breakdown  Goal: Incisions, wounds, or drain sites healing without S/S of infection  2/28/2024 0435 by Harish Valencia RN  Outcome: Progressing  Goal: Oral mucous membranes remain intact  2/28/2024 0435 by Harish Valencia, RN  Outcome: Progressing     Problem: Nutrition Deficit:  Goal: Optimize nutritional status  2/28/2024 0435 by Harish Valencia, RN  Outcome: Progressing

## 2024-02-29 LAB
GLUCOSE BLD-MCNC: 158 MG/DL (ref 75–110)
GLUCOSE BLD-MCNC: 195 MG/DL (ref 75–110)
GLUCOSE BLD-MCNC: 294 MG/DL (ref 75–110)
GLUCOSE BLD-MCNC: 299 MG/DL (ref 75–110)

## 2024-02-29 PROCEDURE — 6370000000 HC RX 637 (ALT 250 FOR IP): Performed by: NURSE PRACTITIONER

## 2024-02-29 PROCEDURE — 99232 SBSQ HOSP IP/OBS MODERATE 35: CPT | Performed by: NURSE PRACTITIONER

## 2024-02-29 PROCEDURE — 97110 THERAPEUTIC EXERCISES: CPT

## 2024-02-29 PROCEDURE — 6360000002 HC RX W HCPCS: Performed by: NURSE PRACTITIONER

## 2024-02-29 PROCEDURE — 82947 ASSAY GLUCOSE BLOOD QUANT: CPT

## 2024-02-29 PROCEDURE — 94640 AIRWAY INHALATION TREATMENT: CPT

## 2024-02-29 PROCEDURE — 1200000000 HC SEMI PRIVATE

## 2024-02-29 PROCEDURE — 97530 THERAPEUTIC ACTIVITIES: CPT

## 2024-02-29 RX ORDER — LOPERAMIDE HYDROCHLORIDE 2 MG/1
2 CAPSULE ORAL 4 TIMES DAILY PRN
Status: DISCONTINUED | OUTPATIENT
Start: 2024-02-29 | End: 2024-03-01 | Stop reason: HOSPADM

## 2024-02-29 RX ADMIN — BUDESONIDE AND FORMOTEROL FUMARATE DIHYDRATE 2 PUFF: 160; 4.5 AEROSOL RESPIRATORY (INHALATION) at 21:56

## 2024-02-29 RX ADMIN — FAMOTIDINE 20 MG: 20 TABLET, FILM COATED ORAL at 07:48

## 2024-02-29 RX ADMIN — ALOGLIPTIN 12.5 MG: 12.5 TABLET, FILM COATED ORAL at 07:48

## 2024-02-29 RX ADMIN — OXYBUTYNIN CHLORIDE 10 MG: 10 TABLET, EXTENDED RELEASE ORAL at 07:48

## 2024-02-29 RX ADMIN — HYDROCODONE BITARTRATE AND ACETAMINOPHEN 2 TABLET: 5; 325 TABLET ORAL at 17:09

## 2024-02-29 RX ADMIN — POLYETHYLENE GLYCOL 3350 17 G: 17 POWDER, FOR SOLUTION ORAL at 09:59

## 2024-02-29 RX ADMIN — MORPHINE SULFATE 4 MG: 4 INJECTION, SOLUTION INTRAMUSCULAR; INTRAVENOUS at 14:16

## 2024-02-29 RX ADMIN — BUDESONIDE AND FORMOTEROL FUMARATE DIHYDRATE 2 PUFF: 160; 4.5 AEROSOL RESPIRATORY (INHALATION) at 10:10

## 2024-02-29 RX ADMIN — TAMSULOSIN HYDROCHLORIDE 0.4 MG: 0.4 CAPSULE ORAL at 07:48

## 2024-02-29 RX ADMIN — HYDROCODONE BITARTRATE AND ACETAMINOPHEN 2 TABLET: 5; 325 TABLET ORAL at 12:15

## 2024-02-29 RX ADMIN — MORPHINE SULFATE 4 MG: 4 INJECTION, SOLUTION INTRAMUSCULAR; INTRAVENOUS at 09:50

## 2024-02-29 RX ADMIN — FERROUS SULFATE TAB EC 325 MG (65 MG FE EQUIVALENT) 325 MG: 325 (65 FE) TABLET DELAYED RESPONSE at 07:49

## 2024-02-29 RX ADMIN — MORPHINE SULFATE 4 MG: 4 INJECTION, SOLUTION INTRAMUSCULAR; INTRAVENOUS at 19:08

## 2024-02-29 RX ADMIN — HYDROCODONE BITARTRATE AND ACETAMINOPHEN 2 TABLET: 5; 325 TABLET ORAL at 07:49

## 2024-02-29 RX ADMIN — METOPROLOL SUCCINATE 25 MG: 25 TABLET, EXTENDED RELEASE ORAL at 07:48

## 2024-02-29 RX ADMIN — ATORVASTATIN CALCIUM 40 MG: 10 TABLET, FILM COATED ORAL at 20:13

## 2024-02-29 RX ADMIN — FAMOTIDINE 20 MG: 20 TABLET, FILM COATED ORAL at 20:13

## 2024-02-29 RX ADMIN — ASPIRIN 81 MG: 81 TABLET, COATED ORAL at 07:48

## 2024-02-29 RX ADMIN — INSULIN LISPRO 8 UNITS: 100 INJECTION, SOLUTION INTRAVENOUS; SUBCUTANEOUS at 12:14

## 2024-02-29 RX ADMIN — MORPHINE SULFATE 4 MG: 4 INJECTION, SOLUTION INTRAMUSCULAR; INTRAVENOUS at 04:51

## 2024-02-29 RX ADMIN — AMLODIPINE BESYLATE 5 MG: 5 TABLET ORAL at 07:48

## 2024-02-29 RX ADMIN — INSULIN GLARGINE 12 UNITS: 100 INJECTION, SOLUTION SUBCUTANEOUS at 17:04

## 2024-02-29 RX ADMIN — ENOXAPARIN SODIUM 40 MG: 100 INJECTION SUBCUTANEOUS at 07:46

## 2024-02-29 ASSESSMENT — PAIN DESCRIPTION - FREQUENCY
FREQUENCY: CONTINUOUS

## 2024-02-29 ASSESSMENT — PAIN SCALES - GENERAL
PAINLEVEL_OUTOF10: 8
PAINLEVEL_OUTOF10: 8
PAINLEVEL_OUTOF10: 6
PAINLEVEL_OUTOF10: 6
PAINLEVEL_OUTOF10: 8
PAINLEVEL_OUTOF10: 6
PAINLEVEL_OUTOF10: 8
PAINLEVEL_OUTOF10: 6
PAINLEVEL_OUTOF10: 8
PAINLEVEL_OUTOF10: 6

## 2024-02-29 ASSESSMENT — PAIN DESCRIPTION - ORIENTATION
ORIENTATION: RIGHT;LEFT
ORIENTATION: LEFT;RIGHT
ORIENTATION: RIGHT;LEFT
ORIENTATION: LEFT;RIGHT
ORIENTATION: RIGHT;LEFT
ORIENTATION: RIGHT;LEFT
ORIENTATION: LEFT;RIGHT

## 2024-02-29 ASSESSMENT — PAIN DESCRIPTION - DESCRIPTORS
DESCRIPTORS: ACHING;DISCOMFORT
DESCRIPTORS: DISCOMFORT;ACHING
DESCRIPTORS: ACHING;DISCOMFORT
DESCRIPTORS: ACHING;DISCOMFORT

## 2024-02-29 ASSESSMENT — ENCOUNTER SYMPTOMS
DIARRHEA: 1
SINUS PAIN: 0
ABDOMINAL PAIN: 1
SINUS PRESSURE: 0
NAUSEA: 0
ABDOMINAL DISTENTION: 0
VOMITING: 0
SHORTNESS OF BREATH: 1
CONSTIPATION: 1
PHOTOPHOBIA: 0
WHEEZING: 0
COUGH: 0

## 2024-02-29 ASSESSMENT — PAIN DESCRIPTION - ONSET
ONSET: ON-GOING

## 2024-02-29 ASSESSMENT — PAIN DESCRIPTION - LOCATION
LOCATION: HAND;LEG
LOCATION: GENERALIZED;LEG
LOCATION: HAND;LEG
LOCATION: GENERALIZED;LEG
LOCATION: LEG;GENERALIZED
LOCATION: GENERALIZED;LEG
LOCATION: LEG;GENERALIZED
LOCATION: GENERALIZED;LEG
LOCATION: LEG;GENERALIZED

## 2024-02-29 ASSESSMENT — PAIN - FUNCTIONAL ASSESSMENT
PAIN_FUNCTIONAL_ASSESSMENT: PREVENTS OR INTERFERES SOME ACTIVE ACTIVITIES AND ADLS

## 2024-02-29 ASSESSMENT — PAIN DESCRIPTION - PAIN TYPE
TYPE: ACUTE PAIN;CHRONIC PAIN
TYPE: ACUTE PAIN;CHRONIC PAIN
TYPE: CHRONIC PAIN;ACUTE PAIN
TYPE: ACUTE PAIN;CHRONIC PAIN
TYPE: CHRONIC PAIN;ACUTE PAIN
TYPE: CHRONIC PAIN;ACUTE PAIN

## 2024-02-29 NOTE — PLAN OF CARE
The History and Physical has been reviewed, the patient has been examined and no changes have occurred in the patient's condition since the H & P was completed.     Heart: regular rate and rhythm    Chest: clear        respiratory therapy assess nares and determine need for appliance change or resting period.  Outcome: Not Progressing

## 2024-02-29 NOTE — CARE COORDINATION
Social work: pt refused PT as he was on the bed pan. Asked PT TO RETURN today as willy Elmore wants to do precert and needs new PT/OT notes from today. PT is agreeable. Will need philip and Rx at discharge. Celena camacho

## 2024-02-29 NOTE — PROGRESS NOTES
[facility]  Office: [phone]  Baldomero Maldonado DO, Luis Valdivia DO, Eric Rojas DO, Rajinder Chiu DO, Erica Daugherty MD, Carolina Hernandez MD, Haleigh Sorto MD, Hannah Blum MD,  Isael Velazco MD, Cora Coughlin MD, Alex Ferrell MD,  Chela Doan DO, Trent Kearns MD, Manolo Potter MD, Hans Maldonado DO, Mei Tolbert MD,  Gianni eD La Rosa DO, Aranza Wakeifeld MD, Magdalena Dill MD, Mary Ann Cheek MD, Jenny Williamson MD,  Panfilo Hall MD, Dorcas Gillis MD, Nato Gunter MD, Opal Davis MD, Red Small MD, Giana Larsen MD, Roberth Eason DO, Carmelo Conroy DO, Marj Davis MD,  Joe Franklin MD, Shirley Waterhouse, CNP,  Xena Harrell, CNP, Trenton Campbell, CNP,  Celena Corona, DNP, Terrie Mathur, CNP, Dalia Lopes, CNP, Kelin Hernandez CNP, Chiquita Manuel, CNP, Anh Kolb, CNP, Geetha Gold, PA-C, Rubina Miller, PA-C, Gris Thorpe, CNP, Paige Higgins, CNP, Elizabeth Clemente, CNP, Lea Fletcher, CNS, Maria Esther Silverio, CNP, Norma Soliman, CNP, Tracy Schwab, CNP         [facility]   IN-PATIENT SERVICE   [facility]    Progress Note    2/29/2024    12:58 PM    Name:   Nicolas Cardenas  MRN:     [id number]     Acct:      [id number]   Room:   2012/2012-02  IP Day:  6  Admit Date:  2/23/2024 12:56 PM    PCP:   Jono Vargas APRN - NP  Code Status:  Full Code    Subjective:     C/C:   Chief Complaint   Patient presents with    Wound Infection     Wounds to bilat stumps     Interval History Status: not changed.     Plan for discharge once placement can be arranged    Brief History:     2/23 - Per previous documentation  Patient presents to the ED with bilateral wound to his bilateral BKAs. Patient has a past medical history of bilateral BKAs, CKD, COPD, diabetes, GERD, hyperlipidemia, hypertension, seizures and hypothyroidism. Patient states that he has been dealing with diarrhea for the past 2 years. He states that any time he eats, he then has  negative  Diabetes  Monitor blood sugar levels ACHS with sliding scale coverage  Education on the need for outpatient compliance with medications and diet  Status post bilateral BKA's with a wound to his left lower extremity  Wound care to continue as an outpatient  Adaptic with Silver alginate to change daily per vascular  ID discontinued antibiotics, no objection from IM  Hypertension, GERD, COPD  Continue metoprolol with holding parameters  Continue PPI  Continue daily inhalers and as needed albuterol    Trenton Campbell, APRN - NP  2/29/2024  12:58 PM

## 2024-02-29 NOTE — PROGRESS NOTES
Physical Therapy  Facility/Department: [facility]  Rehabilitation Physical Therapy Treatment Note    NAME: Nicolas Cardenas  : 1955 (68 y.o.)  MRN: [id number]  CODE STATUS: Full Code    Date of Service: 24       Restrictions:  Restrictions/Precautions: Fall Risk, Contact Precautions, Bed Alarm  Position Activity Restriction  Other position/activity restrictions: Up w/ assist, elevate affected limb, dysphagia diet, hx B BKA     SUBJECTIVE  Subjective  Subjective: pt in bed on bedpan agreeable to bedside PT bedside while he is on bedpan              OBJECTIVE  Cognition  Overall Cognitive Status: Exceptions  Arousal/Alertness: Appropriate responses to stimuli  Following Commands: Inconsistently follows commands  Attention Span: Attends with cues to redirect;Difficulty attending to directions  Memory: Decreased short term memory  Safety Judgement: Decreased awareness of need for assistance;Decreased awareness of need for safety  Problem Solving: Decreased awareness of errors;Assistance required to identify errors made;Assistance required to correct errors made  Insights: Decreased awareness of deficits  Initiation: Requires cues for all  Sequencing: Requires cues for all  Cognition Comment: pt cooperative for all LE ex, pt was on bed pan and insists on staying on bedpan during ex  Orientation  Overall Orientation Status: Within Functional Limits  Orientation Level: Oriented to place;Oriented to situation;Disoriented to time    Functional Mobility  Bed Mobility  Overall Assistance Level: Minimal Assistance                    PT Exercises  Exercise Treatment: deep breathing; positioning education w/ pressure prevention; encouraged sidelyining hip ext ex to maintain good hip motion for future use of prosthetics. glut sets, quad sets, SLR hip abd/add x 15 reps    Issued pt HEP for LE isometrics reviewed ex with pt.       ASSESSMENT/PROGRESS TOWARDS GOALS  Vital Signs  BP Location: Right upper arm  O2 Device:

## 2024-02-29 NOTE — PROGRESS NOTES
all  Sequencing: Requires cues for all  Orientation  Overall Orientation Status: Within Functional Limits  Orientation Level: Oriented to place;Oriented to situation;Disoriented to time         ADL  Grooming/Oral Hygiene  Skilled Clinical Factors: Refused oral hygiene.  Toileting  Skilled Clinical Factors: No needs. RN reported pt just got off of bed pan.              OT Exercises  Exercise Treatment: While supine in bed with HOB raised pt completed UB theraband exercises with blue band for 1 set x 10 reps in all planes to increase UB strength for increased IND with ADL transfers. Required Mod rest breaks. HEP given and edu pt on completing on own.     Assessment  Assessment  Assessment: Pt only agreeable to bed exercises at this time and slighty agitated throughout. Pt continued to believe he cannot get up OOB because he needs to let his legs heal and not receptive to transfer methods. During exercises pt randomly became upset about one of his bandages telling writer to take it off. I refused reporting a nurse needs to look at it and then pt removed the bandage himself from his R lower stump and HA Mcmanus was notified. Continued skilled OT services are indicated at this time to maximize this pt's safety and IND with self care tasks and to facilitate safe return to PLOF as able.  Activity Tolerance: Treatment limited secondary to decreased cognition;Treatment limited secondary to agitation  Discharge Recommendations: Patient would benefit from continued therapy after discharge  Safety Devices  Safety Devices in place: Yes  Type of devices: Nurse notified;Call light within reach;Left in bed;Bed alarm in place;Patient at risk for falls;All fall risk precautions in place    Patient Education  Education  Education Given To: Patient  Education Provided: Role of Therapy;Home Exercise Program;Safety;Precautions  Education Method: Verbal;Demonstration;Printed Information/Hand-outs  Barriers to Learning: Cognition  Education  Outcome: Continued education needed    Access Code: [email or web address]  URL: [email or web address]  Date: 02/29/2024  Prepared by: Ha Covarrubias II    Exercises  - Seated Shoulder Horizontal Abduction with Resistance  - 1 x daily - 7 x weekly - 3 sets - 10 reps  - Seated Elbow Flexion with Self-Anchored Resistance  - 1 x daily - 7 x weekly - 3 sets - 10 reps  - Seated Shoulder Flexion with Self-Anchored Resistance  - 1 x daily - 7 x weekly - 3 sets - 10 reps  - Seated Single Shoulder PNF D2 with Resistance  - 1 x daily - 7 x weekly - 3 sets - 10 reps  - Standing Shoulder Row with Anchored Resistance  - 1 x daily - 7 x weekly - 3 sets - 10 reps  - Chest Press with Resistance  - 1 x daily - 7 x weekly - 3 sets - 10 reps  - Seated Elbow Extension with Self-Anchored Resistance  - 1 x daily - 7 x weekly - 3 sets - 10 reps    Plan  Occupational Therapy Plan  Times Per Week: 3-5x/week 1x/day as tolerated  Current Treatment Recommendations: Strengthening;ROM;Balance training;Neuromuscular re-education;Endurance training;Patient/Caregiver education & training;Safety education & training;Wheelchair mobility training;Self-Care / ADL;Cognitive/Perceptual training;Positioning;Equipment evaluation, education, & procurement    Goals  Patient Goals   Patient goals : Not stated by pt during session d/t agitation.  Short Term Goals  Time Frame for Short Term Goals: By discharge, pt to demo:  Short Term Goal 1: bed mobility tasks to IND with Good safety & Good pacing and use of bedrails as needed.  Short Term Goal 2: increased sitting tolerance at EOB with SUP to > 12 minutes while engaging in functional tasks to promote functional activity tolerance/seated balance required for safety/IND with self care tasks.  Short Term Goal 3: increased BUE strength by 1/2 grade to promote functional strength/ROM required for increased safety/IND with self care tasks.  Short Term Goal 4: UB ADLs to SBA/SUP and LB ADLs to MinAx1 with Good safety and use

## 2024-02-29 NOTE — PLAN OF CARE
Problem: Respiratory - Adult  Goal: Patient's breath sounds will be clear and equal  2/29/2024 1010 by Dilcia Myers RCP  Outcome: Progressing     Problem: Respiratory - Adult  Goal: Able to breathe comfortably  2/29/2024 1010 by Dilcia Myers RCP  Outcome: Progressing     Problem: Pain  Goal: Verbalizes/displays adequate comfort level or baseline comfort level  2/29/2024 0700 by Nina Turpin RN  Outcome: Not Progressing     Problem: Skin/Tissue Integrity  Goal: Absence of new skin breakdown  Description: 1.  Monitor for areas of redness and/or skin breakdown  2.  Assess vascular access sites hourly  3.  Every 4-6 hours minimum:  Change oxygen saturation probe site  4.  Every 4-6 hours:  If on nasal continuous positive airway pressure, respiratory therapy assess nares and determine need for appliance change or resting period.  2/29/2024 0700 by Nina Turpin, RN  Outcome: Not Progressing

## 2024-02-29 NOTE — PROGRESS NOTES
Comprehensive Nutrition Assessment    Type and Reason for Visit:  Reassess    Nutrition Recommendations/Plan:   Provide diet ex as ordered   Provide supplements as ordered   Monitor labs as they become available   Monitor skin integrity/weights     Malnutrition Assessment:  Malnutrition Status:  Moderate malnutrition (02/26/24 1814)    Context:  Acute Illness     Findings of the 6 clinical characteristics of malnutrition:  Energy Intake:  50% or less of estimated energy requirements for 5 or more days  Weight Loss:  5% over 1 month     Body Fat Loss:  Mild body fat loss Triceps   Muscle Mass Loss:  Mild muscle mass loss Clavicles (pectoralis & deltoids)  Fluid Accumulation:  No significant fluid accumulation Extremities   Strength:  Not Performed    Nutrition Assessment:    Patient admission is related to failure to thrive in adult and leg ulcer. Patient has a medical history for bilateral BKA, esophageal cancer and diabetes mellitus. Patient lives alone and reports poor appetite and chronic diarrhea. Patient reports weight loss, fat and muscle mass loss. Patient reports years ago he was 230 lbs but now his weight has gone down to less than 120 lbs. Patient has lost 5% of weight in 1 month. Patient has noted fat and muscle mass loss. Patient has bilateral wound to BKA stumps and has been unable to wear prosthetics. Continues on wound healing supplements and ensure plus high protein providing 1050 kcals and 60 grams of protein if all three are 100% consumed. [facility]charge planning [facility] they are waiting on PT notes. weight on 2/29 was 119#, weight history 12/19 was 119#. Moderate malnutrition previously noted. Monitor po intakes, supplement acceptance, weights, labs, skin integrity.    Nutrition Related Findings:    No edema. Active bowel sounds. Chronic diarrhea. Loss of appetite. BKA. Bilateral stump wounds Wound Type: Unstageable, Stage III       Current Nutrition Intake & Therapies:    Average Meal  Intake: 1-25%  Average Supplements Intake: %  ADULT DIET; Dysphagia - Soft and Bite Sized; 4 carb choices (60 gm/meal)  ADULT ORAL NUTRITION SUPPLEMENT; Breakfast, Dinner; Wound Healing Oral Supplement  ADULT ORAL NUTRITION SUPPLEMENT; Breakfast, Lunch, Dinner; Low Calorie/High Protein Oral Supplement    Anthropometric Measures:  Height: 188 cm (6' 2\")  Ideal Body Weight (IBW): 190 lbs (86 kg)       Current Body Weight: 51.3 kg (113 lb 1.5 oz), 59.5 % IBW. Weight Source: Bed Scale  Current BMI (kg/m2): 14.5  Usual Body Weight: 54 kg (119 lb) (12/12/23)  % Weight Change (Calculated): -5  Weight Adjustment For: Amputation  Total Adjusted Percentage (Calculated): 20.2  Adjusted Ideal Body Weight (lbs) (Calculated): 151.6 lbs  Adjusted Ideal Body Weight (kg) (Calculated): 68.91 kg  Adjusted % Ideal Body Weight (Calculated): 74.6  Adjusted BMI (kg/m2) (Calculated): 17.4  BMI Categories: Underweight (BMI less than 22) age over 65    Estimated Daily Nutrient Needs:  Energy Requirements Based On: Kcal/kg  Weight Used for Energy Requirements: Current  Energy (kcal/day): 6564-1201 kcal (35-37 kcal/kg)  Weight Used for Protein Requirements: Current  Protein (g/day): 72-87 gm of protein (1.5-1.7 gm/kg)  Method Used for Fluid Requirements: 1 ml/kcal  Fluid (ml/day): 4882-8337 mL    Nutrition Diagnosis:   Moderate malnutrition related to inadequate protein-energy intake as evidenced by intake 0-25%, weight loss greater than or equal to 5% in 1 month, mild muscle loss, mild loss of subcutaneous fat  Increased nutrient needs related to increase demand for energy/nutrients as evidenced by wounds (bilateral wounds on BKA stumps)    Nutrition Interventions:   Food and/or Nutrient Delivery: Continue Current Diet, Continue Oral Nutrition Supplement  Nutrition Education/Counseling: Education not indicated  Coordination of Nutrition Care: Continue to monitor while inpatient       Goals:  Previous Goal Met: Progressing toward

## 2024-02-29 NOTE — ACP (ADVANCE CARE PLANNING)
Advance Care Planning     Advance Care Planning Activator (Inpatient)  Conversation Note      Date of ACP Conversation: 2/29/2024     Conversation Conducted with: Patient with Decision Making Capacity    ACP Activator: Deann Quintana RN      Health Care Decision Maker: Daughter- Marisela Ramirez    Current Designated Health Care Decision Maker:     Primary Decision Maker: Marisela Ramirez - Child - [phone]    Today we documented Decision Maker(s) consistent with Legal Next of Kin hierarchy.    Care Preferences    Ventilation:  \"If you were in your present state of health and suddenly became very ill and were unable to breathe on your own, what would your preference be about the use of a ventilator (breathing machine) if it were available to you?\"      Would the patient desire the use of ventilator (breathing machine)?: yes    \"If your health worsens and it becomes clear that your chance of recovery is unlikely, what would your preference be about the use of a ventilator (breathing machine) if it were available to you?\"     Would the patient desire the use of ventilator (breathing machine)?: Yes      Resuscitation  \"CPR works best to restart the heart when there is a sudden event, like a heart attack, in someone who is otherwise healthy. Unfortunately, CPR does not typically restart the heart for people who have serious health conditions or who are very sick.\"    \"In the event your heart stopped as a result of an underlying serious health condition, would you want attempts to be made to restart your heart (answer \"yes\" for attempt to resuscitate) or would you prefer a natural death (answer \"no\" for do not attempt to resuscitate)?\" yes       [] Yes   [x] No   Educated Patient / Decision Maker regarding differences between Advance Directives and portable DNR orders.        Conversation Outcomes:  ACP discussion completed

## 2024-02-29 NOTE — PROGRESS NOTES
End Of Shift Note  [facility]  Summary of shift: Patient had uneventful day. Constant pain med coverage, tolerating diet, small BM today.    Vitals:    Vitals:    02/29/24 0333 02/29/24 0444 02/29/24 0735 02/29/24 1229   BP:  (!) 151/69 137/77 (!) 130/92   Pulse:  97 81 90   Resp:  16 16 18   Temp:  98.4 °F (36.9 °C) 98.1 °F (36.7 °C) 97.9 °F (36.6 °C)   TempSrc:  Oral Oral Oral   SpO2:  94% 93% 95%   Weight: 54 kg (119 lb 0.8 oz)      Height:            I&O:   Intake/Output Summary (Last 24 hours) at 2/29/2024 1822  Last data filed at 2/29/2024 1425  Gross per 24 hour   Intake 420 ml   Output 650 ml   Net -230 ml       Resp Status: RA    Ventilator Settings:     / / /     Critical Care IV infusions:   sodium chloride Stopped (02/26/24 1022)    sodium chloride      dextrose          LDA:   Wound Knee Posterior (Active)   Number of days:        Wound 02/23/24 Pretibial Proximal;Right (Active)   Number of days: 5       Wound 02/23/24 Pretibial Left;Proximal (Active)   Number of days: 5

## 2024-02-29 NOTE — CARE COORDINATION
CM spoke with patient and his friend Lorelei at bedside to discuss transitional planning. Patient is agreeable with SNF referral to [facility]. Shahla from [facility] to speak with patient at bedside. Patient is agreeable to [facility] if [facility] is unable to accept. Await response from [facility] on if they are able to accept.    1200- [facility] is unable to accept patient d/t insurance. Otilio Elmore to initiate precert today.

## 2024-02-29 NOTE — PROGRESS NOTES
Physical Therapy  DATE: 2024    NAME: Nicolas Cardenas  MRN: [id number]   : 1955    Patient not seen this date for Physical Therapy due to:      [] Cancel by RN or physician due to:    [] Hemodialysis    [] Critical Lab Value Level     [] Blood transfusion in progress    [] Acute or unstable cardiovascular status   _MAP < 55 or more than >115  _HR < 40 or > 130    [] Acute or unstable pulmonary status   -FiO2 > 60%   _RR < 5 or >40    _O2 sats < 85%    [] Strict Bedrest    [] Off Unit for surgery or procedure    [] Off Unit for testing       [] Pending imaging to R/O fracture    [] Refusal by Patient      [x] Other pt on bedpan      [] PT being discontinued at this time. Patient independent. No further needs.     [] PT being discontinued at this time as the patient has been transferred to hospice care. No further needs.      RADHA CABALLERO, PTA

## 2024-02-29 NOTE — PROGRESS NOTES
Requiring frequent Morphine IM for pain. Drsg over L stump ulcer changed. Large amount of tannish yellow drng noted. Cleased and redressed.

## 2024-02-29 NOTE — PLAN OF CARE
Problem: Respiratory - Adult  Goal: Patient's breath sounds will be clear and equal  2/29/2024 0931 by Dilcia Myers RCP  Outcome: Progressing     Problem: Respiratory - Adult  Goal: Able to breathe comfortably  2/29/2024 0931 by Dilcia Myers RCP  Outcome: Progressing     Problem: Pain  Goal: Verbalizes/displays adequate comfort level or baseline comfort level  2/29/2024 0700 by Nina Turpin RN  Outcome: Not Progressing     Problem: Skin/Tissue Integrity  Goal: Absence of new skin breakdown  Description: 1.  Monitor for areas of redness and/or skin breakdown  2.  Assess vascular access sites hourly  3.  Every 4-6 hours minimum:  Change oxygen saturation probe site  4.  Every 4-6 hours:  If on nasal continuous positive airway pressure, respiratory therapy assess nares and determine need for appliance change or resting period.  2/29/2024 0700 by Nina Turpin, RN  Outcome: Not Progressing

## 2024-03-01 VITALS
BODY MASS INDEX: 15 KG/M2 | RESPIRATION RATE: 16 BRPM | HEART RATE: 90 BPM | SYSTOLIC BLOOD PRESSURE: 142 MMHG | OXYGEN SATURATION: 95 % | DIASTOLIC BLOOD PRESSURE: 68 MMHG | HEIGHT: 74 IN | TEMPERATURE: 97.5 F | WEIGHT: 116.84 LBS

## 2024-03-01 LAB
GLUCOSE BLD-MCNC: 262 MG/DL (ref 75–110)
GLUCOSE BLD-MCNC: 283 MG/DL (ref 75–110)

## 2024-03-01 PROCEDURE — 6360000002 HC RX W HCPCS: Performed by: NURSE PRACTITIONER

## 2024-03-01 PROCEDURE — 94761 N-INVAS EAR/PLS OXIMETRY MLT: CPT

## 2024-03-01 PROCEDURE — 6370000000 HC RX 637 (ALT 250 FOR IP): Performed by: NURSE PRACTITIONER

## 2024-03-01 PROCEDURE — 99239 HOSP IP/OBS DSCHRG MGMT >30: CPT | Performed by: NURSE PRACTITIONER

## 2024-03-01 PROCEDURE — 97110 THERAPEUTIC EXERCISES: CPT

## 2024-03-01 PROCEDURE — 82947 ASSAY GLUCOSE BLOOD QUANT: CPT

## 2024-03-01 PROCEDURE — 94640 AIRWAY INHALATION TREATMENT: CPT

## 2024-03-01 RX ADMIN — INSULIN LISPRO 8 UNITS: 100 INJECTION, SOLUTION INTRAVENOUS; SUBCUTANEOUS at 12:38

## 2024-03-01 RX ADMIN — AMLODIPINE BESYLATE 5 MG: 5 TABLET ORAL at 08:23

## 2024-03-01 RX ADMIN — METOPROLOL SUCCINATE 25 MG: 25 TABLET, EXTENDED RELEASE ORAL at 08:24

## 2024-03-01 RX ADMIN — HYDROCODONE BITARTRATE AND ACETAMINOPHEN 2 TABLET: 5; 325 TABLET ORAL at 13:04

## 2024-03-01 RX ADMIN — INSULIN LISPRO 8 UNITS: 100 INJECTION, SOLUTION INTRAVENOUS; SUBCUTANEOUS at 08:23

## 2024-03-01 RX ADMIN — TAMSULOSIN HYDROCHLORIDE 0.4 MG: 0.4 CAPSULE ORAL at 08:24

## 2024-03-01 RX ADMIN — ENOXAPARIN SODIUM 40 MG: 100 INJECTION SUBCUTANEOUS at 08:20

## 2024-03-01 RX ADMIN — HYDROCODONE BITARTRATE AND ACETAMINOPHEN 2 TABLET: 5; 325 TABLET ORAL at 05:28

## 2024-03-01 RX ADMIN — ALOGLIPTIN 12.5 MG: 12.5 TABLET, FILM COATED ORAL at 08:24

## 2024-03-01 RX ADMIN — MORPHINE SULFATE 4 MG: 4 INJECTION, SOLUTION INTRAMUSCULAR; INTRAVENOUS at 08:51

## 2024-03-01 RX ADMIN — BUDESONIDE AND FORMOTEROL FUMARATE DIHYDRATE 2 PUFF: 160; 4.5 AEROSOL RESPIRATORY (INHALATION) at 07:46

## 2024-03-01 RX ADMIN — ASPIRIN 81 MG: 81 TABLET, COATED ORAL at 08:23

## 2024-03-01 RX ADMIN — FERROUS SULFATE TAB EC 325 MG (65 MG FE EQUIVALENT) 325 MG: 325 (65 FE) TABLET DELAYED RESPONSE at 08:25

## 2024-03-01 RX ADMIN — OXYBUTYNIN CHLORIDE 10 MG: 10 TABLET, EXTENDED RELEASE ORAL at 08:24

## 2024-03-01 RX ADMIN — PSYLLIUM HUSK 1 PACKET: 3.4 POWDER ORAL at 08:20

## 2024-03-01 RX ADMIN — FAMOTIDINE 20 MG: 20 TABLET, FILM COATED ORAL at 08:24

## 2024-03-01 RX ADMIN — MORPHINE SULFATE 4 MG: 4 INJECTION, SOLUTION INTRAMUSCULAR; INTRAVENOUS at 15:13

## 2024-03-01 ASSESSMENT — PAIN SCALES - GENERAL
PAINLEVEL_OUTOF10: 7
PAINLEVEL_OUTOF10: 8
PAINLEVEL_OUTOF10: 8
PAINLEVEL_OUTOF10: 7
PAINLEVEL_OUTOF10: 7

## 2024-03-01 ASSESSMENT — PAIN DESCRIPTION - ORIENTATION
ORIENTATION: RIGHT;LEFT

## 2024-03-01 ASSESSMENT — ENCOUNTER SYMPTOMS
PHOTOPHOBIA: 0
SHORTNESS OF BREATH: 1
SINUS PRESSURE: 0
ABDOMINAL PAIN: 1
CONSTIPATION: 1
SINUS PAIN: 0
DIARRHEA: 1
ABDOMINAL DISTENTION: 0
NAUSEA: 0
VOMITING: 0
COUGH: 0
WHEEZING: 0

## 2024-03-01 ASSESSMENT — PAIN DESCRIPTION - LOCATION
LOCATION: LEG;ABDOMEN
LOCATION: LEG

## 2024-03-01 ASSESSMENT — PAIN DESCRIPTION - DESCRIPTORS
DESCRIPTORS: ACHING
DESCRIPTORS: ACHING

## 2024-03-01 NOTE — PLAN OF CARE
Problem: Respiratory - Adult  Goal: Patient's breath sounds will be clear and equal  3/1/2024 0903 by Fer Baird RCP  Outcome: Progressing     Problem: Respiratory - Adult  Goal: Able to breathe comfortably  3/1/2024 0903 by Fer Baird RCP  Outcome: Progressing

## 2024-03-01 NOTE — DISCHARGE SUMMARY
[facility]  Office: [phone]  Baldomero Maldonado DO, Luis Valdivia DO, Eric Rojas DO, Rajinder Chiu DO, Erica Daugherty MD, Carolina Hernandez MD, Haleigh Sorto MD, Hannah Blum MD,  Isael Velazco MD, Cora Coughlin MD, Alex Ferrell MD,  Chela Doan DO, Trent Kearns MD, Manolo Potter MD, Hans Maldonado DO, Mei Tolbert MD,  Gianni De La Rosa DO, Aranza [place] MD, Magdalena Dill MD, Mary Ann Cheek MD, Jenny Williamson MD,  Panfilo Hall MD, Dorcas Gillis MD, Nato Gunter MD, Opal Davis MD, Red Small MD, Giana Larsen MD, Roberth Eason DO, Carmelo Conroy DO, Marj Davis MD,  Joe Franklin MD, Shirley Waterhouse, CNP,  Xena Harrell, CNP, Trenton Campbell, CNP,  Celena Corona, ANIYAH, Terrie Mathur, CNP, Dalia Lopes, CNP, Kelin Hernandez CNP, Chiquita Manuel, CNP, Anh Kolb, CNP, Geetha Gold, PA-C, Rubina Miller, PA-C, Gris Thorpe, CNP, Paige Higgins, CNP, Elizabeth Clemente, CNP, Lea Fletcher, CNS, Maria Esther Silverio, CNP, Norma Soliman, CNP, Tracy Schwab, CNP         [facility]   IN-PATIENT SERVICE   [facility]    Discharge Summary     Patient ID: Nicolas Cardenas  :  1955   MRN: [id number]     ACCOUNT:  [id number]   Patient's PCP: Jono Vargas APRN - NP  Admit Date: 2024   Discharge Date: 3/1/2024 =    Length of Stay: 7  Code Status:  Full Code  Admitting Physician: Chela DUBOIS DO  Discharge Physician: Trenton C Hurst, APRN - NP     Active Discharge Diagnoses:     Hospital Problem Lists:  Principal Problem:    Failure to thrive in adult  Active Problems:    Type 2 diabetes mellitus with diabetic polyneuropathy, with long-term current use of insulin (HCC)    Dysphagia    Esophageal cancer (HCC)    COPD without exacerbation (HCC)    Gastroesophageal reflux disease without esophagitis    S/P BKA (below knee amputation) bilateral (HCC)    Essential hypertension    Moderate malnutrition (HCC)    Wound of left lower  this discharge summary is in conjunction with any daily progress note from day of discharge.    Discharge plan:     Disposition: [facility]    Physician Follow Up:     Jono Vargas APRN - NP  [address]  [facility] [address] [phone]    Follow up      Ismael Larry MD  [address]  [facility] [address]  [phone]    Follow up in 1 month(s)         Requiring Further Evaluation/Follow Up POST HOSPITALIZATION/Incidental Findings: as above    Diet: diabetic diet    Activity: As tolerated    Instructions to Patient: It is vital that you actually manage your chronic medical problems appropriately and following medical advice if you wish to have any meaningful recovery.  Engaged with the therapist at the skilled facility.  Monitor and manage your blood sugars.  Engage with nursing care respectfully.  Take your medications exactly as prescribed and follow through with doctors appointments.    Discharge Medications:      Medication List        CHANGE how you take these medications      Eliquis 5 MG Tabs tablet  Generic drug: apixaban  What changed: Another medication with the same name was removed. Continue taking this medication, and follow the directions you see here.            CONTINUE taking these medications      Advanced Probiotic 10 Caps     albuterol sulfate  (90 Base) MCG/ACT inhaler  Commonly known as: Ventolin HFA  Inhale 2 puffs into the lungs every 6 hours as needed for Wheezing     amLODIPine 5 MG tablet  Commonly known as: NORVASC     aspirin EC 81 MG EC tablet     atorvastatin 40 MG tablet  Commonly known as: LIPITOR     Basaglar KwikPen 100 UNIT/ML injection pen  Generic drug: insulin glargine     doxepin 3 MG Tabs tablet  Commonly known as: SILENOR     famotidine 20 MG tablet  Commonly known as: PEPCID  Take 1 tablet by mouth 2 times daily     ferrous sulfate 325 (65 Fe) MG tablet  Commonly known as: IRON 325  Take 1 tablet by mouth daily (with breakfast)

## 2024-03-01 NOTE — PLAN OF CARE
Problem: Discharge Planning  Goal: Discharge to home or other facility with appropriate resources  Outcome: Progressing     Problem: Pain  Goal: Verbalizes/displays adequate comfort level or baseline comfort level  Outcome: Progressing     Problem: Skin/Tissue Integrity  Goal: Absence of new skin breakdown  Description: 1.  Monitor for areas of redness and/or skin breakdown  2.  Assess vascular access sites hourly  3.  Every 4-6 hours minimum:  Change oxygen saturation probe site  4.  Every 4-6 hours:  If on nasal continuous positive airway pressure, respiratory therapy assess nares and determine need for appliance change or resting period.  Outcome: Progressing     Problem: Safety - Adult  Goal: Free from fall injury  Outcome: Progressing     Problem: ABCDS Injury Assessment  Goal: Absence of physical injury  Outcome: Progressing     Problem: Respiratory - Adult  Goal: Patient's breath sounds will be clear and equal  2/29/2024 2109 by Millie Collado RCP  Outcome: Progressing  Goal: Able to breathe comfortably  2/29/2024 2109 by Millie Collado RCP  Outcome: Progressing     Problem: Chronic Conditions and Co-morbidities  Goal: Patient's chronic conditions and co-morbidity symptoms are monitored and maintained or improved  Outcome: Progressing     Problem: Neurosensory - Adult  Goal: Achieves stable or improved neurological status  Outcome: Progressing  Goal: Absence of seizures  Outcome: Progressing  Goal: Remains free of injury related to seizures activity  Outcome: Progressing  Goal: Achieves maximal functionality and self care  Outcome: Progressing     Problem: Musculoskeletal - Adult  Goal: Return mobility to safest level of function  Outcome: Progressing  Goal: Maintain proper alignment of affected body part  Outcome: Progressing  Goal: Return ADL status to a safe level of function  Outcome: Progressing     Problem: Gastrointestinal - Adult  Goal: Minimal or absence of nausea and vomiting  Outcome:  Progressing  Goal: Maintains or returns to baseline bowel function  Outcome: Progressing  Goal: Maintains adequate nutritional intake  Outcome: Progressing  Goal: Establish and maintain optimal ostomy function  Outcome: Progressing     Problem: Infection - Adult  Goal: Absence of infection at discharge  Outcome: Progressing  Goal: Absence of infection during hospitalization  Outcome: Progressing  Goal: Absence of fever/infection during anticipated neutropenic period  Outcome: Progressing     Problem: Metabolic/Fluid and Electrolytes - Adult  Goal: Electrolytes maintained within normal limits  Outcome: Progressing  Goal: Hemodynamic stability and optimal renal function maintained  Outcome: Progressing  Goal: Glucose maintained within prescribed range  Outcome: Progressing     Problem: Skin/Tissue Integrity - Adult  Goal: Skin integrity remains intact  Outcome: Progressing  Goal: Incisions, wounds, or drain sites healing without S/S of infection  Outcome: Progressing  Goal: Oral mucous membranes remain intact  Outcome: Progressing     Problem: Nutrition Deficit:  Goal: Optimize nutritional status  Outcome: Progressing

## 2024-03-01 NOTE — PLAN OF CARE
Problem: Discharge Planning  Goal: Discharge to home or other facility with appropriate resources  3/1/2024 1538 by Hans Chacko RN  Outcome: Adequate for Discharge     Problem: Pain  Goal: Verbalizes/displays adequate comfort level or baseline comfort level  3/1/2024 1538 by Hans Chacko RN  Outcome: Adequate for Discharge     Problem: Skin/Tissue Integrity  Goal: Absence of new skin breakdown  Description: 1.  Monitor for areas of redness and/or skin breakdown  2.  Assess vascular access sites hourly  3.  Every 4-6 hours minimum:  Change oxygen saturation probe site  4.  Every 4-6 hours:  If on nasal continuous positive airway pressure, respiratory therapy assess nares and determine need for appliance change or resting period.  3/1/2024 1538 by Hans Chacko RN  Outcome: Adequate for Discharge     Problem: Safety - Adult  Goal: Free from fall injury  3/1/2024 1538 by Hans Chacko RN  Outcome: Adequate for Discharge     Problem: ABCDS Injury Assessment  Goal: Absence of physical injury  3/1/2024 1538 by Hans Chacko RN  Outcome: Adequate for Discharge     Problem: Chronic Conditions and Co-morbidities  Goal: Patient's chronic conditions and co-morbidity symptoms are monitored and maintained or improved  3/1/2024 1538 by Hans Chacko RN  Outcome: Adequate for Discharge     Problem: Neurosensory - Adult  Goal: Achieves stable or improved neurological status  3/1/2024 1538 by Hans Chacko RN  Outcome: Adequate for Discharge     Problem: Neurosensory - Adult  Goal: Achieves stable or improved neurological status  3/1/2024 1538 by Hans Chacko RN  Outcome: Adequate for Discharge     Problem: Neurosensory - Adult  Goal: Absence of seizures  3/1/2024 1538 by Hans Chacko RN  Outcome: Adequate for Discharge     Problem: Neurosensory - Adult  Goal: Remains free of injury related to seizures activity  3/1/2024 1538 by Hans Chacko RN  Outcome: Adequate for Discharge

## 2024-03-01 NOTE — CARE COORDINATION
Social work; [facility] completed. [facility] and [facility] faxed with [facility] summary [facility]. Pt can notify his family once time is certain. [facility] is trying to set up for possible 3 pm transfer to snf.  Report [phone]  Fax: [phone]  Rep Ama [phone]  Celena camacho

## 2024-03-01 NOTE — CARE COORDINATION
Social work: got auth for [facility]. Will need philip and Rx at discharge. Celena camacho

## 2024-03-01 NOTE — PLAN OF CARE
Problem: Respiratory - Adult  Goal: Patient's breath sounds will be clear and equal  2/29/2024 2109 by Millie Collado RCP  Outcome: Progressing     Problem: Respiratory - Adult  Goal: Able to breathe comfortably  2/29/2024 2109 by Millie Collado RCP  Outcome: Progressing
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
## 2024-03-01 NOTE — PROGRESS NOTES
[facility]  Office: [phone]  Baldomero Maldonado DO, Luis Valdivia DO, Eric Rojas DO, Rajinder Chiu DO, Erica Daugherty MD, Carolina Hernandez MD, Haleigh Sorto MD, Hannah Blum MD,  Isael Velazco MD, Cora Coughlin MD, Alex Ferrell MD,  Chela Doan DO, Trent Kearns MD, Manolo Potter MD, Hans Maldonado DO, Mei Tolbert MD,  Gianni De La Rosa DO, Aranza Wakefield MD, Magdalena Dill MD, Mary Ann Cheek MD, Jenny Williamson MD,  Panfilo Hall MD, Dorcas Gillis MD, Nato Gunter MD, Opal Davis MD, Red Small MD, Giana Larsen MD, Roberth Eason DO, Carmelo Conroy DO, Marj Davis MD,  Joe Franklin MD, Shirley Waterhouse, CNP,  Xena Harrell, CNP, Trenton Campbell, CNP,  Celena Corona, DNP, Terrie Mathur, CNP, Dalia Lopes, CNP, Kelin Hernandez CNP, Chiquita Manuel, CNP, Anh Kolb, CNP, Geetha Gold, PA-C, Rubina Miller, PA-C, Gris Thorpe, CNP, Paige Higgins, CNP, Elizabeth Clemente, CNP, Lea Fletcher, CNS, Maria Esther Silverio, CNP, Norma Soliman, CNP, Tracy Schwab, CNP         [facility]   IN-PATIENT SERVICE   [facility]    Progress Note    3/1/2024    9:01 AM    Name:   Nicolas Cardenas  MRN:     [id number]     Acct:      [id number]   Room:   2012/2012-02  IP Day:  7  Admit Date:  2/23/2024 12:56 PM    PCP:   Jono Vargas APRN - NP  Code Status:  Full Code    Subjective:     C/C:   Chief Complaint   Patient presents with    Wound Infection     Wounds to bilat stumps     Interval History Status: not changed.     Plan for discharge once placement can be arranged    Brief History:     2/23 - Per previous documentation  Patient presents to the ED with bilateral wound to his bilateral BKAs. Patient has a past medical history of bilateral BKAs, CKD, COPD, diabetes, GERD, hyperlipidemia, hypertension, seizures and hypothyroidism. Patient states that he has been dealing with diarrhea for the past 2 years. He states that any time he eats, he then has to  Labs     02/29/24  1118 02/29/24  1656 02/29/24 1947 03/01/24  0620   POCGLU 299* 195* 294* 283*         I/O (24Hr):    Intake/Output Summary (Last 24 hours) at 3/1/2024 0901  Last data filed at 3/1/2024 0616  Gross per 24 hour   Intake 300 ml   Output 950 ml   Net -650 ml         Labs:  Hematology:  No results for input(s): \"WBC\", \"RBC\", \"HGB\", \"HCT\", \"MCV\", \"MCH\", \"MCHC\", \"RDW\", \"PLT\", \"MPV\", \"SEDRATE\", \"CRP\", \"INR\", \"DDIMER\", \"MF6GQWVT\", \"LABABSO\" in the last 72 hours.    Invalid input(s): \"PT\"    Chemistry:  No results for input(s): \"NA\", \"K\", \"CL\", \"CO2\", \"GLUCOSE\", \"BUN\", \"CREATININE\", \"MG\", \"ANIONGAP\", \"LABGLOM\", \"GFRAA\", \"CALCIUM\", \"CAION\", \"PHOS\", \"PSA\", \"PROBNP\", \"TROPHS\", \"CKTOTAL\", \"CKMB\", \"CKMBINDEX\", \"MYOGLOBIN\", \"DIGOXIN\", \"LACTACIDWB\" in the last 72 hours.    Recent Labs     02/28/24  2111 02/29/24  0616 02/29/24 1118 02/29/24 1656 02/29/24 1947 03/01/24  0620   POCGLU 221* 158* 299* 195* 294* 283*       ABG:  Lab Results   Component Value Date/Time    FIO2 NOT REPORTED 02/19/2022 07:07 PM     Lab Results   Component Value Date/Time    SPECIAL RT AC 10ML 02/23/2024 04:20 PM     Lab Results   Component Value Date/Time    CULTURE NO GROWTH 5 DAYS 02/23/2024 04:20 PM       Radiology:  CT ABDOMEN PELVIS WO CONTRAST Additional Contrast? None    Result Date: 2/23/2024  1. Multiple bilateral nonobstructing renal calculi up to 6 mm in size. Greater stone burden on the right. 2. Tiny bladder calculus at the base. 3. Prostate gland enlargement. 4. Large hiatal hernia. 5. Mild pancreatic duct dilatation up to 5 mm in the body. Lack of IV contrast limits assessment. Findings similar to prior.  This may be assessed in more detail with MRI/MRCP. 6. Bilateral pleural thickening versus trace pleural effusion.     XR FEMUR LEFT (MIN 2 VIEWS)    Result Date: 2/23/2024  1. No radiographic evidence of osteomyelitis. 2. Degenerative changes in the hip and knee joints.       Physical Examination:        Physical

## 2024-03-01 NOTE — PLAN OF CARE
Problem: Discharge Planning  Goal: Discharge to home or other facility with appropriate resources  3/1/2024 1051 by Hans Chacko, RN  Outcome: Progressing  Flowsheets (Taken 3/1/2024 1051)  Discharge to home or other facility with appropriate resources: Identify barriers to discharge with patient and caregiver     Problem: Pain  Goal: Verbalizes/displays adequate comfort level or baseline comfort level  3/1/2024 1051 by Hans Chacko, RN  Outcome: Progressing  Flowsheets (Taken 3/1/2024 1051)  Verbalizes/displays adequate comfort level or baseline comfort level: Encourage patient to monitor pain and request assistance     Problem: Skin/Tissue Integrity  Goal: Absence of new skin breakdown  Description: 1.  Monitor for areas of redness and/or skin breakdown  2.  Assess vascular access sites hourly  3.  Every 4-6 hours minimum:  Change oxygen saturation probe site  4.  Every 4-6 hours:  If on nasal continuous positive airway pressure, respiratory therapy assess nares and determine need for appliance change or resting period.  3/1/2024 1051 by Hans Chacko, RN  Outcome: Progressing     Problem: Safety - Adult  Goal: Free from fall injury  3/1/2024 1051 by Hans Chacko, RN  Outcome: Progressing  Flowsheets (Taken 3/1/2024 1051)  Free From Fall Injury: Instruct family/caregiver on patient safety     Problem: ABCDS Injury Assessment  Goal: Absence of physical injury  3/1/2024 1051 by Hans Chacko, RN  Outcome: Progressing  Flowsheets (Taken 3/1/2024 1051)  Absence of Physical Injury: Implement safety measures based on patient assessment

## 2024-03-24 NOTE — PROGRESS NOTES
Atrial fibrillation with RVR  Fully anticoagulated on Xarelto  Resume digoxin  Add diltiazem for persistent heart rate more than 110  Maintain on telemetry  Check TSH with reflex T4  Consult cardiology   Comprehensive Nutrition Assessment    Type and Reason for Visit:  Positive Nutrition Screen, Consult(Pressure ulcer or non-healing wound. Nutrition consult: wounds)    Nutrition Recommendations/Plan:   1. Continue DIET CARB CONTROL  2. Start Trent 2x/day   3. Monitor p.o intakes, labs and wound healing status    Nutrition Assessment:  Patient admission is related to cellulitis    Malnutrition Assessment:  Malnutrition Status:  No malnutrition      Estimated Daily Nutrient Needs:  Energy (kcal):  9789-0391 kcal (25-28 kcal/kg); Weight Used for Energy Requirements:  Adjusted(67.9 kg)     Protein (g):  81-95 gm (1.2-1.4 gm/kg); Weight Used for Protein Requirements:  Adjusted(67.9 kg)          Nutrition Related Findings:  No edema. 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)      Wounds:  Open Wounds       Current Nutrition Therapies:    DIET CARB CONTROL; Dietary Nutrition Supplements: Wound Healing Oral Supplement    Anthropometric Measures:  · Height: 6' (182.9 cm)  · Current Body Weight: 141 lb (64 kg)   · Admission Body Weight: 150 lb (68 kg)(state)    · Usual Body Weight: 161 lb (73 kg)(8/3/20 bed scale)     · Ideal Body Weight: 178 lbs; % Ideal Body Weight 79.2 %   · BMI: 19.1  · Adjusted Body Weight: 149.3;  Amputation   · Adjusted BMI: 20.2      Nutrition Diagnosis:   · Increased nutrient needs related to increase demand for energy/nutrients as evidenced by wounds(left stump wound)    Nutrition Interventions:   Food and/or Nutrient Delivery:  Continue Current Diet, Start Oral Nutrition Supplement  Nutrition Education/Counseling:  Education not indicated   Coordination of Nutrition Care:  Continue to monitor while inpatient    Goals:  PO intakes are greater than 75% at meals       Nutrition Monitoring and Evaluation:   Food/Nutrient Intake Outcomes:  Supplement Intake, Food and Nutrient Intake  Physical Signs/Symptoms Outcomes:  Biochemical Data, Fluid Status or Edema, Skin, Weight     Discharge Planning:    Continue current diet, Continue Oral Nutrition Supplement         Angie DAVISN, RDN, LDN  Lead Clinical Dietitian  RD Office Phone [phone]

## 2024-03-25 PROBLEM — E86.0 DEHYDRATION: Status: RESOLVED | Noted: 2024-02-24 | Resolved: 2024-03-25

## 2024-05-09 NOTE — PROGRESS NOTES
"Chief Complaint  Back Pain (Upper back and neck pain, radiating into legs at times. Pain started Tues night, no known injury. )    Subjective          History of Present Illness  Martin Mcgovern is here today with back pain    Patient states that he noticed about 3 nights ago he had some lower back pain that really has been running across his back.  He states that in 20 area.  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  He states that he had low back pain with sciatica but this is higher up on his back.  He states that sometimes the pain travels up his back and is painful in his neck as well.  States that almost anything he has legs.  He states he has been taking ibuprofen 600 which seems to be helping.  He states that he went to the hydrocodone because he had a couple leftover from a tooth being pulled which seem to help as well.  He has a TENS unit as well as Biofreeze and a heating pad which he has been using.  He states it has been hard time sleeping with it and has not been able to get comfortable.  He denies any numbness or tingling to the area.    Objective   Vital Signs:   /86   Pulse 69   Ht 177.8 cm (70\")   Wt 96.2 kg (212 lb)   SpO2 96%   BMI 30.42 kg/m²     Body mass index is 30.42 kg/m².         Review of Systems      Current Outpatient Medications:   •  buprenorphine-naloxone (SUBOXONE) 8-2 MG film film, , Disp: , Rfl:   •  docusate sodium (COLACE) 100 MG capsule, Take 1 capsule by mouth Daily., Disp: , Rfl:   •  GNP Melatonin Maximum Strength 5 MG tablet tablet, Take 1 tablet by mouth Every Night., Disp: , Rfl:   •  Magnesium Oxide 400 (240 Mg) MG tablet, Take 1 tablet by mouth Daily., Disp: , Rfl:   •  Diclofenac Sodium (VOLTAREN) 1 % gel gel, Apply 4 g topically to the appropriate area as directed 4 (Four) Times a Day As Needed (joint pain)., Disp: 150 g, Rfl: 3  •  linaclotide (Linzess) 145 MCG capsule capsule, Take 1 capsule by mouth Every Morning Before " Gus Chowdary was evaluated today and a DME order was entered for a wheeled walker because he requires this to successfully complete daily living tasks of eating, bathing, personal cares, hygiene, meal preparation and taking own medications. A wheeled walker is necessary due to the patient's unsteady gait, upper body weakness, and inability to  an ambulation device; and he can ambulate only by pushing a walker instead of a lesser assistive device such as a cane, crutch, or standard walker. The need for this equipment was discussed with the patient and he understands and is in agreement. Breakfast., Disp: 90 capsule, Rfl: 3  •  predniSONE (DELTASONE) 20 MG tablet, 2 tab po qd x 2 days then 1 tab po qd x 2 days, Disp: 6 tablet, Rfl: 0    Allergies: Patient has no known allergies.    Physical Exam  Vitals and nursing note reviewed.   Constitutional:       General: He is not in acute distress.     Appearance: Normal appearance. He is normal weight. He is not ill-appearing, toxic-appearing or diaphoretic.   HENT:      Head: Normocephalic and atraumatic.   Cardiovascular:      Rate and Rhythm: Normal rate and regular rhythm.      Heart sounds: Normal heart sounds.   Pulmonary:      Effort: Pulmonary effort is normal.      Breath sounds: Normal breath sounds.   Musculoskeletal:        Back:    Neurological:      Mental Status: He is alert.        Result Review :                   Assessment and Plan    Diagnoses and all orders for this visit:    1. Upper back strain, initial encounter (Primary)  -     Diclofenac Sodium (VOLTAREN) 1 % gel gel; Apply 4 g topically to the appropriate area as directed 4 (Four) Times a Day As Needed (joint pain).  Dispense: 150 g; Refill: 3  -     predniSONE (DELTASONE) 20 MG tablet; 2 tab po qd x 2 days then 1 tab po qd x 2 days  Dispense: 6 tablet; Refill: 0    This patient that this appears to be strain of the muscles of her back and I am not sure exactly how he did it but he is certainly been dealing with that since.  Would have him add prednisone as well and has Voltaren gel to help decrease amount of inflations going on.  Would have him continue his ibuprofen 603 times a day or Aleve 2 tablets once a day.  We cautioned against him using Norco as it is an allergy idea since he is already on Suboxone.  This should be improving over the next week or so if he finds that things are getting worse or not improved after a week he is to come back and see us in our office.    Follow Up   No follow-ups on file.  Patient was given instructions and counseling regarding his condition  or for health maintenance advice. Please see specific information pulled into the AVS if appropriate.     REYES Gutierrez  05/09/2024

## 2024-09-12 ENCOUNTER — HOSPITAL ENCOUNTER (INPATIENT)
Age: 69
LOS: 17 days | Discharge: NURSING, REHAB OR LONG-TERM CARE | DRG: 854 | End: 2024-09-29
Attending: EMERGENCY MEDICINE | Admitting: STUDENT IN AN ORGANIZED HEALTH CARE EDUCATION/TRAINING PROGRAM
Payer: COMMERCIAL

## 2024-09-12 ENCOUNTER — APPOINTMENT (OUTPATIENT)
Dept: CT IMAGING | Age: 69
DRG: 854 | End: 2024-09-12
Payer: COMMERCIAL

## 2024-09-12 ENCOUNTER — APPOINTMENT (OUTPATIENT)
Dept: GENERAL RADIOLOGY | Age: 69
DRG: 854 | End: 2024-09-12
Payer: COMMERCIAL

## 2024-09-12 DIAGNOSIS — N17.9 AKI (ACUTE KIDNEY INJURY) (HCC): ICD-10-CM

## 2024-09-12 DIAGNOSIS — N39.0 URINARY TRACT INFECTION WITHOUT HEMATURIA, SITE UNSPECIFIED: Primary | ICD-10-CM

## 2024-09-12 DIAGNOSIS — Z78.9 UNABLE TO CARE FOR SELF: ICD-10-CM

## 2024-09-12 DIAGNOSIS — R31.9 HEMATURIA, UNSPECIFIED TYPE: ICD-10-CM

## 2024-09-12 DIAGNOSIS — R33.9 URINARY RETENTION: ICD-10-CM

## 2024-09-12 DIAGNOSIS — M79.2 NEUROPATHIC PAIN, LEG: ICD-10-CM

## 2024-09-12 DIAGNOSIS — E87.1 HYPONATREMIA: ICD-10-CM

## 2024-09-12 PROBLEM — N30.00 ACUTE CYSTITIS WITHOUT HEMATURIA: Status: ACTIVE | Noted: 2024-09-12

## 2024-09-12 LAB
ALBUMIN SERPL-MCNC: 3.5 G/DL (ref 3.5–5.2)
ALP SERPL-CCNC: 159 U/L (ref 40–129)
ALT SERPL-CCNC: 8 U/L (ref 5–41)
AMPHET UR QL SCN: NEGATIVE
ANION GAP SERPL CALCULATED.3IONS-SCNC: 16 MMOL/L (ref 9–17)
AST SERPL-CCNC: 10 U/L
BARBITURATES UR QL SCN: NEGATIVE
BASOPHILS # BLD: 0.09 K/UL (ref 0–0.2)
BASOPHILS NFR BLD: 0 % (ref 0–2)
BENZODIAZ UR QL: NEGATIVE
BILIRUB SERPL-MCNC: 0.1 MG/DL (ref 0.3–1.2)
BILIRUB UR QL STRIP: NEGATIVE
BUN SERPL-MCNC: 30 MG/DL (ref 8–23)
BUN/CREAT SERPL: 19 (ref 9–20)
CALCIUM SERPL-MCNC: 9.4 MG/DL (ref 8.6–10.4)
CANNABINOIDS UR QL SCN: NEGATIVE
CHLORIDE SERPL-SCNC: 100 MMOL/L (ref 98–107)
CLARITY UR: ABNORMAL
CO2 SERPL-SCNC: 14 MMOL/L (ref 20–31)
COCAINE UR QL SCN: NEGATIVE
COLOR UR: YELLOW
CREAT SERPL-MCNC: 1.6 MG/DL (ref 0.7–1.2)
EOSINOPHIL # BLD: 0.16 K/UL (ref 0–0.44)
EOSINOPHILS RELATIVE PERCENT: 1 % (ref 1–4)
EPI CELLS #/AREA URNS HPF: NORMAL /HPF (ref 0–5)
ERYTHROCYTE [DISTWIDTH] IN BLOOD BY AUTOMATED COUNT: 13.7 % (ref 11.8–14.4)
FENTANYL UR QL: POSITIVE
GFR, ESTIMATED: 47 ML/MIN/1.73M2
GLUCOSE SERPL-MCNC: 178 MG/DL (ref 70–99)
GLUCOSE UR STRIP-MCNC: NEGATIVE MG/DL
HCT VFR BLD AUTO: 34.5 % (ref 40.7–50.3)
HGB BLD-MCNC: 10.7 G/DL (ref 13–17)
HGB UR QL STRIP.AUTO: ABNORMAL
IMM GRANULOCYTES # BLD AUTO: 0.17 K/UL (ref 0–0.3)
IMM GRANULOCYTES NFR BLD: 1 %
INR PPP: 1.7
KETONES UR STRIP-MCNC: NEGATIVE MG/DL
LEUKOCYTE ESTERASE UR QL STRIP: ABNORMAL
LIPASE SERPL-CCNC: 52 U/L (ref 13–60)
LYMPHOCYTES NFR BLD: 2.02 K/UL (ref 1.1–3.7)
LYMPHOCYTES RELATIVE PERCENT: 10 % (ref 24–43)
MCH RBC QN AUTO: 29.2 PG (ref 25.2–33.5)
MCHC RBC AUTO-ENTMCNC: 31 G/DL (ref 28.4–34.8)
MCV RBC AUTO: 94 FL (ref 82.6–102.9)
METHADONE UR QL: NEGATIVE
MONOCYTES NFR BLD: 0.59 K/UL (ref 0.1–1.2)
MONOCYTES NFR BLD: 3 % (ref 3–12)
NEUTROPHILS NFR BLD: 85 % (ref 36–65)
NEUTS SEG NFR BLD: 17.4 K/UL (ref 1.5–8.1)
NITRITE UR QL STRIP: NEGATIVE
NRBC BLD-RTO: 0 PER 100 WBC
OPIATES UR QL SCN: NEGATIVE
OXYCODONE UR QL SCN: POSITIVE
PCP UR QL SCN: NEGATIVE
PH UR STRIP: 5.5 [PH] (ref 5–8)
PLATELET # BLD AUTO: 783 K/UL (ref 138–453)
PMV BLD AUTO: 8.5 FL (ref 8.1–13.5)
POTASSIUM SERPL-SCNC: 4.9 MMOL/L (ref 3.7–5.3)
PROT SERPL-MCNC: 8.5 G/DL (ref 6.4–8.3)
PROT UR STRIP-MCNC: ABNORMAL MG/DL
PROTHROMBIN TIME: 20 SEC (ref 11.5–14.2)
RBC # BLD AUTO: 3.67 M/UL (ref 4.21–5.77)
RBC #/AREA URNS HPF: NORMAL /HPF (ref 0–2)
SODIUM SERPL-SCNC: 130 MMOL/L (ref 135–144)
SP GR UR STRIP: 1.02 (ref 1–1.03)
TEST INFORMATION: ABNORMAL
UROBILINOGEN UR STRIP-ACNC: NORMAL EU/DL (ref 0–1)
WBC #/AREA URNS HPF: NORMAL /HPF (ref 0–5)
WBC OTHER # BLD: 20.4 K/UL (ref 3.5–11.3)

## 2024-09-12 PROCEDURE — 2000000000 HC ICU R&B

## 2024-09-12 PROCEDURE — 81001 URINALYSIS AUTO W/SCOPE: CPT

## 2024-09-12 PROCEDURE — 51798 US URINE CAPACITY MEASURE: CPT

## 2024-09-12 PROCEDURE — 85610 PROTHROMBIN TIME: CPT

## 2024-09-12 PROCEDURE — 80053 COMPREHEN METABOLIC PANEL: CPT

## 2024-09-12 PROCEDURE — 83690 ASSAY OF LIPASE: CPT

## 2024-09-12 PROCEDURE — 87040 BLOOD CULTURE FOR BACTERIA: CPT

## 2024-09-12 PROCEDURE — 74177 CT ABD & PELVIS W/CONTRAST: CPT

## 2024-09-12 PROCEDURE — 2580000003 HC RX 258: Performed by: EMERGENCY MEDICINE

## 2024-09-12 PROCEDURE — 6370000000 HC RX 637 (ALT 250 FOR IP): Performed by: EMERGENCY MEDICINE

## 2024-09-12 PROCEDURE — 2060000000 HC ICU INTERMEDIATE R&B

## 2024-09-12 PROCEDURE — 6360000004 HC RX CONTRAST MEDICATION: Performed by: EMERGENCY MEDICINE

## 2024-09-12 PROCEDURE — 6360000002 HC RX W HCPCS: Performed by: EMERGENCY MEDICINE

## 2024-09-12 PROCEDURE — 87086 URINE CULTURE/COLONY COUNT: CPT

## 2024-09-12 PROCEDURE — 96375 TX/PRO/DX INJ NEW DRUG ADDON: CPT

## 2024-09-12 PROCEDURE — 80307 DRUG TEST PRSMV CHEM ANLYZR: CPT

## 2024-09-12 PROCEDURE — 71045 X-RAY EXAM CHEST 1 VIEW: CPT

## 2024-09-12 PROCEDURE — 85025 COMPLETE CBC W/AUTO DIFF WBC: CPT

## 2024-09-12 PROCEDURE — 96376 TX/PRO/DX INJ SAME DRUG ADON: CPT

## 2024-09-12 PROCEDURE — 99285 EMERGENCY DEPT VISIT HI MDM: CPT

## 2024-09-12 PROCEDURE — 96374 THER/PROPH/DIAG INJ IV PUSH: CPT

## 2024-09-12 PROCEDURE — 99223 1ST HOSP IP/OBS HIGH 75: CPT

## 2024-09-12 RX ORDER — ONDANSETRON 2 MG/ML
4 INJECTION INTRAMUSCULAR; INTRAVENOUS ONCE
Status: COMPLETED | OUTPATIENT
Start: 2024-09-12 | End: 2024-09-12

## 2024-09-12 RX ORDER — 0.9 % SODIUM CHLORIDE 0.9 %
1000 INTRAVENOUS SOLUTION INTRAVENOUS ONCE
Status: COMPLETED | OUTPATIENT
Start: 2024-09-12 | End: 2024-09-12

## 2024-09-12 RX ORDER — 0.9 % SODIUM CHLORIDE 0.9 %
100 INTRAVENOUS SOLUTION INTRAVENOUS ONCE
Status: COMPLETED | OUTPATIENT
Start: 2024-09-12 | End: 2024-09-12

## 2024-09-12 RX ORDER — 0.9 % SODIUM CHLORIDE 0.9 %
500 INTRAVENOUS SOLUTION INTRAVENOUS ONCE
Status: COMPLETED | OUTPATIENT
Start: 2024-09-12 | End: 2024-09-13

## 2024-09-12 RX ORDER — IOPAMIDOL 755 MG/ML
75 INJECTION, SOLUTION INTRAVASCULAR
Status: COMPLETED | OUTPATIENT
Start: 2024-09-12 | End: 2024-09-12

## 2024-09-12 RX ORDER — FENTANYL CITRATE 0.05 MG/ML
50 INJECTION, SOLUTION INTRAMUSCULAR; INTRAVENOUS ONCE
Status: COMPLETED | OUTPATIENT
Start: 2024-09-12 | End: 2024-09-12

## 2024-09-12 RX ORDER — SODIUM CHLORIDE 0.9 % (FLUSH) 0.9 %
10 SYRINGE (ML) INJECTION PRN
Status: DISCONTINUED | OUTPATIENT
Start: 2024-09-12 | End: 2024-09-13 | Stop reason: SDUPTHER

## 2024-09-12 RX ORDER — DOCUSATE SODIUM 100 MG/1
100 CAPSULE, LIQUID FILLED ORAL ONCE
Status: COMPLETED | OUTPATIENT
Start: 2024-09-12 | End: 2024-09-12

## 2024-09-12 RX ADMIN — AZITHROMYCIN DIHYDRATE 500 MG: 500 INJECTION, POWDER, LYOPHILIZED, FOR SOLUTION INTRAVENOUS at 22:10

## 2024-09-12 RX ADMIN — SODIUM CHLORIDE 100 ML: 9 INJECTION, SOLUTION INTRAVENOUS at 18:34

## 2024-09-12 RX ADMIN — SODIUM CHLORIDE, PRESERVATIVE FREE 10 ML: 5 INJECTION INTRAVENOUS at 18:34

## 2024-09-12 RX ADMIN — DOCUSATE SODIUM 100 MG: 100 CAPSULE, LIQUID FILLED ORAL at 21:54

## 2024-09-12 RX ADMIN — HYDROMORPHONE HYDROCHLORIDE 0.5 MG: 1 INJECTION, SOLUTION INTRAMUSCULAR; INTRAVENOUS; SUBCUTANEOUS at 20:53

## 2024-09-12 RX ADMIN — SODIUM CHLORIDE 500 ML: 9 INJECTION, SOLUTION INTRAVENOUS at 22:08

## 2024-09-12 RX ADMIN — FENTANYL CITRATE 50 MCG: 0.05 INJECTION, SOLUTION INTRAMUSCULAR; INTRAVENOUS at 18:09

## 2024-09-12 RX ADMIN — ONDANSETRON 4 MG: 2 INJECTION, SOLUTION INTRAMUSCULAR; INTRAVENOUS at 20:53

## 2024-09-12 RX ADMIN — SODIUM CHLORIDE 1000 ML: 9 INJECTION, SOLUTION INTRAVENOUS at 18:09

## 2024-09-12 RX ADMIN — ONDANSETRON 4 MG: 2 INJECTION, SOLUTION INTRAMUSCULAR; INTRAVENOUS at 18:10

## 2024-09-12 RX ADMIN — WATER 1000 MG: 1 INJECTION INTRAMUSCULAR; INTRAVENOUS; SUBCUTANEOUS at 21:58

## 2024-09-12 RX ADMIN — IOPAMIDOL 75 ML: 755 INJECTION, SOLUTION INTRAVENOUS at 18:34

## 2024-09-12 ASSESSMENT — PAIN DESCRIPTION - FREQUENCY: FREQUENCY: CONTINUOUS

## 2024-09-12 ASSESSMENT — PAIN DESCRIPTION - DESCRIPTORS
DESCRIPTORS: ACHING
DESCRIPTORS: SHARP

## 2024-09-12 ASSESSMENT — PAIN DESCRIPTION - LOCATION
LOCATION: ABDOMEN;LEG
LOCATION: ABDOMEN

## 2024-09-12 ASSESSMENT — PAIN DESCRIPTION - ORIENTATION
ORIENTATION: MID
ORIENTATION: RIGHT;LEFT

## 2024-09-12 ASSESSMENT — PAIN SCALES - GENERAL
PAINLEVEL_OUTOF10: 7
PAINLEVEL_OUTOF10: 10
PAINLEVEL_OUTOF10: 9

## 2024-09-12 ASSESSMENT — PAIN DESCRIPTION - PAIN TYPE: TYPE: ACUTE PAIN

## 2024-09-12 ASSESSMENT — PAIN - FUNCTIONAL ASSESSMENT: PAIN_FUNCTIONAL_ASSESSMENT: 0-10

## 2024-09-13 PROBLEM — D68.69 SECONDARY HYPERCOAGULABLE STATE (HCC): Status: ACTIVE | Noted: 2024-09-13

## 2024-09-13 PROBLEM — K86.1 CHRONIC PANCREATITIS (HCC): Status: ACTIVE | Noted: 2024-09-13

## 2024-09-13 PROBLEM — N39.0 URINARY TRACT INFECTION WITHOUT HEMATURIA: Status: ACTIVE | Noted: 2024-09-13

## 2024-09-13 LAB
ANION GAP SERPL CALCULATED.3IONS-SCNC: 13 MMOL/L (ref 9–17)
BASOPHILS # BLD: 0.14 K/UL (ref 0–0.2)
BASOPHILS NFR BLD: 1 % (ref 0–2)
BUN SERPL-MCNC: 27 MG/DL (ref 8–23)
BUN/CREAT SERPL: 18 (ref 9–20)
CALCIUM SERPL-MCNC: 8.6 MG/DL (ref 8.6–10.4)
CHLORIDE SERPL-SCNC: 108 MMOL/L (ref 98–107)
CO2 SERPL-SCNC: 14 MMOL/L (ref 20–31)
CREAT SERPL-MCNC: 1.5 MG/DL (ref 0.7–1.2)
EOSINOPHIL # BLD: 0.3 K/UL (ref 0–0.44)
EOSINOPHILS RELATIVE PERCENT: 2 % (ref 1–4)
ERYTHROCYTE [DISTWIDTH] IN BLOOD BY AUTOMATED COUNT: 13.9 % (ref 11.8–14.4)
GFR, ESTIMATED: 51 ML/MIN/1.73M2
GLUCOSE BLD-MCNC: 144 MG/DL (ref 75–110)
GLUCOSE BLD-MCNC: 157 MG/DL (ref 75–110)
GLUCOSE BLD-MCNC: 227 MG/DL (ref 75–110)
GLUCOSE SERPL-MCNC: 178 MG/DL (ref 70–99)
HCO3 VENOUS: 14.2 MMOL/L (ref 22–29)
HCT VFR BLD AUTO: 30 % (ref 40.7–50.3)
HGB BLD-MCNC: 9.1 G/DL (ref 13–17)
IMM GRANULOCYTES # BLD AUTO: 0.1 K/UL (ref 0–0.3)
IMM GRANULOCYTES NFR BLD: 1 %
INR PPP: 1.8
LYMPHOCYTES NFR BLD: 2.38 K/UL (ref 1.1–3.7)
LYMPHOCYTES RELATIVE PERCENT: 14 % (ref 24–43)
MCH RBC QN AUTO: 29.2 PG (ref 25.2–33.5)
MCHC RBC AUTO-ENTMCNC: 30.3 G/DL (ref 28.4–34.8)
MCV RBC AUTO: 96.2 FL (ref 82.6–102.9)
MICROORGANISM SPEC CULT: NORMAL
MONOCYTES NFR BLD: 1.11 K/UL (ref 0.1–1.2)
MONOCYTES NFR BLD: 7 % (ref 3–12)
NEGATIVE BASE EXCESS, VEN: 10.3 MMOL/L (ref 0–2)
NEUTROPHILS NFR BLD: 75 % (ref 36–65)
NEUTS SEG NFR BLD: 12.5 K/UL (ref 1.5–8.1)
NRBC BLD-RTO: 0 PER 100 WBC
O2 SAT, VEN: 99.4 % (ref 60–85)
PCO2 VENOUS: 26.7 MM HG (ref 41–51)
PH VENOUS: 7.34 (ref 7.32–7.43)
PHOSPHATE SERPL-MCNC: 4.9 MG/DL (ref 2.5–4.5)
PLATELET # BLD AUTO: 684 K/UL (ref 138–453)
PMV BLD AUTO: 8.6 FL (ref 8.1–13.5)
PO2 VENOUS: 167 MM HG (ref 30–50)
POTASSIUM SERPL-SCNC: 4.3 MMOL/L (ref 3.7–5.3)
PROCALCITONIN SERPL-MCNC: 0.14 NG/ML (ref 0–0.09)
PROTHROMBIN TIME: 21.1 SEC (ref 11.5–14.2)
RBC # BLD AUTO: 3.12 M/UL (ref 4.21–5.77)
SODIUM SERPL-SCNC: 135 MMOL/L (ref 135–144)
SPECIMEN DESCRIPTION: NORMAL
WBC OTHER # BLD: 16.5 K/UL (ref 3.5–11.3)

## 2024-09-13 PROCEDURE — 2500000003 HC RX 250 WO HCPCS

## 2024-09-13 PROCEDURE — 6370000000 HC RX 637 (ALT 250 FOR IP): Performed by: INTERNAL MEDICINE

## 2024-09-13 PROCEDURE — 87324 CLOSTRIDIUM AG IA: CPT

## 2024-09-13 PROCEDURE — 87493 C DIFF AMPLIFIED PROBE: CPT

## 2024-09-13 PROCEDURE — 6360000002 HC RX W HCPCS: Performed by: NURSE PRACTITIONER

## 2024-09-13 PROCEDURE — 36415 COLL VENOUS BLD VENIPUNCTURE: CPT

## 2024-09-13 PROCEDURE — 6360000002 HC RX W HCPCS

## 2024-09-13 PROCEDURE — 2580000003 HC RX 258

## 2024-09-13 PROCEDURE — 84100 ASSAY OF PHOSPHORUS: CPT

## 2024-09-13 PROCEDURE — 87449 NOS EACH ORGANISM AG IA: CPT

## 2024-09-13 PROCEDURE — 99222 1ST HOSP IP/OBS MODERATE 55: CPT | Performed by: NURSE PRACTITIONER

## 2024-09-13 PROCEDURE — 85025 COMPLETE CBC W/AUTO DIFF WBC: CPT

## 2024-09-13 PROCEDURE — 87506 IADNA-DNA/RNA PROBE TQ 6-11: CPT

## 2024-09-13 PROCEDURE — 80048 BASIC METABOLIC PNL TOTAL CA: CPT

## 2024-09-13 PROCEDURE — 6370000000 HC RX 637 (ALT 250 FOR IP): Performed by: NURSE PRACTITIONER

## 2024-09-13 PROCEDURE — 99232 SBSQ HOSP IP/OBS MODERATE 35: CPT | Performed by: INTERNAL MEDICINE

## 2024-09-13 PROCEDURE — 85610 PROTHROMBIN TIME: CPT

## 2024-09-13 PROCEDURE — 99213 OFFICE O/P EST LOW 20 MIN: CPT

## 2024-09-13 PROCEDURE — 6360000002 HC RX W HCPCS: Performed by: INTERNAL MEDICINE

## 2024-09-13 PROCEDURE — 82947 ASSAY GLUCOSE BLOOD QUANT: CPT

## 2024-09-13 PROCEDURE — 2060000000 HC ICU INTERMEDIATE R&B

## 2024-09-13 PROCEDURE — 6370000000 HC RX 637 (ALT 250 FOR IP)

## 2024-09-13 PROCEDURE — 84145 PROCALCITONIN (PCT): CPT

## 2024-09-13 PROCEDURE — 82803 BLOOD GASES ANY COMBINATION: CPT

## 2024-09-13 PROCEDURE — 94761 N-INVAS EAR/PLS OXIMETRY MLT: CPT

## 2024-09-13 RX ORDER — IPRATROPIUM BROMIDE AND ALBUTEROL SULFATE 2.5; .5 MG/3ML; MG/3ML
1 SOLUTION RESPIRATORY (INHALATION) EVERY 4 HOURS PRN
Status: DISCONTINUED | OUTPATIENT
Start: 2024-09-13 | End: 2024-09-29 | Stop reason: HOSPADM

## 2024-09-13 RX ORDER — POLYETHYLENE GLYCOL 3350 17 G/17G
17 POWDER, FOR SOLUTION ORAL DAILY PRN
Status: DISCONTINUED | OUTPATIENT
Start: 2024-09-13 | End: 2024-09-13

## 2024-09-13 RX ORDER — INSULIN GLARGINE 100 [IU]/ML
18 INJECTION, SOLUTION SUBCUTANEOUS DAILY
Status: DISCONTINUED | OUTPATIENT
Start: 2024-09-13 | End: 2024-09-29 | Stop reason: HOSPADM

## 2024-09-13 RX ORDER — VANCOMYCIN HYDROCHLORIDE 50 MG/ML
125 KIT ORAL EVERY 6 HOURS SCHEDULED
Status: DISCONTINUED | OUTPATIENT
Start: 2024-09-13 | End: 2024-09-15

## 2024-09-13 RX ORDER — ACETAMINOPHEN 325 MG/1
650 TABLET ORAL EVERY 6 HOURS PRN
Status: DISCONTINUED | OUTPATIENT
Start: 2024-09-13 | End: 2024-09-29 | Stop reason: HOSPADM

## 2024-09-13 RX ORDER — DICYCLOMINE HYDROCHLORIDE 10 MG/ML
20 INJECTION INTRAMUSCULAR 4 TIMES DAILY PRN
Status: DISCONTINUED | OUTPATIENT
Start: 2024-09-13 | End: 2024-09-14

## 2024-09-13 RX ORDER — WARFARIN SODIUM 2.5 MG/1
2.5 TABLET ORAL DAILY
COMMUNITY

## 2024-09-13 RX ORDER — SODIUM BICARBONATE 650 MG/1
1300 TABLET ORAL 2 TIMES DAILY
Status: DISCONTINUED | OUTPATIENT
Start: 2024-09-13 | End: 2024-09-29 | Stop reason: HOSPADM

## 2024-09-13 RX ORDER — QUETIAPINE FUMARATE 50 MG/1
50 TABLET, FILM COATED ORAL DAILY
COMMUNITY

## 2024-09-13 RX ORDER — ONDANSETRON 2 MG/ML
4 INJECTION INTRAMUSCULAR; INTRAVENOUS EVERY 6 HOURS PRN
Status: DISCONTINUED | OUTPATIENT
Start: 2024-09-13 | End: 2024-09-13

## 2024-09-13 RX ORDER — POTASSIUM CHLORIDE 1500 MG/1
40 TABLET, EXTENDED RELEASE ORAL PRN
Status: DISCONTINUED | OUTPATIENT
Start: 2024-09-13 | End: 2024-09-29 | Stop reason: HOSPADM

## 2024-09-13 RX ORDER — CHOLESTYRAMINE LIGHT 4 G/5.7G
4 POWDER, FOR SUSPENSION ORAL DAILY
Status: DISCONTINUED | OUTPATIENT
Start: 2024-09-13 | End: 2024-09-29 | Stop reason: HOSPADM

## 2024-09-13 RX ORDER — HYDROMORPHONE HYDROCHLORIDE 1 MG/ML
1 INJECTION, SOLUTION INTRAMUSCULAR; INTRAVENOUS; SUBCUTANEOUS EVERY 4 HOURS PRN
Status: DISCONTINUED | OUTPATIENT
Start: 2024-09-13 | End: 2024-09-14

## 2024-09-13 RX ORDER — SODIUM CHLORIDE 9 MG/ML
INJECTION, SOLUTION INTRAVENOUS PRN
Status: DISCONTINUED | OUTPATIENT
Start: 2024-09-13 | End: 2024-09-29 | Stop reason: HOSPADM

## 2024-09-13 RX ORDER — SODIUM CHLORIDE 0.9 % (FLUSH) 0.9 %
10 SYRINGE (ML) INJECTION PRN
Status: DISCONTINUED | OUTPATIENT
Start: 2024-09-13 | End: 2024-09-29 | Stop reason: HOSPADM

## 2024-09-13 RX ORDER — ACETAMINOPHEN 160 MG
1 TABLET,DISINTEGRATING ORAL DAILY
COMMUNITY

## 2024-09-13 RX ORDER — ONDANSETRON 4 MG/1
4 TABLET, ORALLY DISINTEGRATING ORAL EVERY 8 HOURS PRN
Status: DISCONTINUED | OUTPATIENT
Start: 2024-09-13 | End: 2024-09-29 | Stop reason: HOSPADM

## 2024-09-13 RX ORDER — GUAIFENESIN 600 MG/1
600 TABLET, EXTENDED RELEASE ORAL 2 TIMES DAILY
Status: DISCONTINUED | OUTPATIENT
Start: 2024-09-13 | End: 2024-09-29 | Stop reason: HOSPADM

## 2024-09-13 RX ORDER — VANCOMYCIN HYDROCHLORIDE 125 MG/1
125 CAPSULE ORAL 2 TIMES DAILY
Status: DISCONTINUED | OUTPATIENT
Start: 2024-09-13 | End: 2024-09-13

## 2024-09-13 RX ORDER — WARFARIN SODIUM 2.5 MG/1
2.5 TABLET ORAL
Status: COMPLETED | OUTPATIENT
Start: 2024-09-13 | End: 2024-09-13

## 2024-09-13 RX ORDER — TRAZODONE HYDROCHLORIDE 50 MG/1
50 TABLET, FILM COATED ORAL ONCE
Status: COMPLETED | OUTPATIENT
Start: 2024-09-13 | End: 2024-09-13

## 2024-09-13 RX ORDER — LANOLIN ALCOHOL/MO/W.PET/CERES
3 CREAM (GRAM) TOPICAL NIGHTLY PRN
Status: DISCONTINUED | OUTPATIENT
Start: 2024-09-13 | End: 2024-09-29 | Stop reason: HOSPADM

## 2024-09-13 RX ORDER — SODIUM CHLORIDE 0.9 % (FLUSH) 0.9 %
5-40 SYRINGE (ML) INJECTION EVERY 12 HOURS SCHEDULED
Status: DISCONTINUED | OUTPATIENT
Start: 2024-09-13 | End: 2024-09-29 | Stop reason: HOSPADM

## 2024-09-13 RX ORDER — MAGNESIUM SULFATE 1 G/100ML
1000 INJECTION INTRAVENOUS PRN
Status: DISCONTINUED | OUTPATIENT
Start: 2024-09-13 | End: 2024-09-29 | Stop reason: HOSPADM

## 2024-09-13 RX ORDER — ONDANSETRON 2 MG/ML
4 INJECTION INTRAMUSCULAR; INTRAVENOUS EVERY 4 HOURS PRN
Status: DISCONTINUED | OUTPATIENT
Start: 2024-09-13 | End: 2024-09-29 | Stop reason: HOSPADM

## 2024-09-13 RX ORDER — TAMSULOSIN HYDROCHLORIDE 0.4 MG/1
0.4 CAPSULE ORAL DAILY
COMMUNITY

## 2024-09-13 RX ORDER — UMECLIDINIUM 62.5 UG/1
1 AEROSOL, POWDER ORAL DAILY
COMMUNITY

## 2024-09-13 RX ORDER — TAMSULOSIN HYDROCHLORIDE 0.4 MG/1
0.4 CAPSULE ORAL NIGHTLY
Status: DISCONTINUED | OUTPATIENT
Start: 2024-09-13 | End: 2024-09-16

## 2024-09-13 RX ORDER — FENTANYL CITRATE 0.05 MG/ML
50 INJECTION, SOLUTION INTRAMUSCULAR; INTRAVENOUS ONCE
Status: COMPLETED | OUTPATIENT
Start: 2024-09-13 | End: 2024-09-13

## 2024-09-13 RX ORDER — INSULIN LISPRO 100 [IU]/ML
0-4 INJECTION, SOLUTION INTRAVENOUS; SUBCUTANEOUS NIGHTLY
Status: DISCONTINUED | OUTPATIENT
Start: 2024-09-13 | End: 2024-09-29 | Stop reason: HOSPADM

## 2024-09-13 RX ORDER — INSULIN LISPRO 100 [IU]/ML
0-4 INJECTION, SOLUTION INTRAVENOUS; SUBCUTANEOUS
Status: DISCONTINUED | OUTPATIENT
Start: 2024-09-13 | End: 2024-09-29 | Stop reason: HOSPADM

## 2024-09-13 RX ORDER — DEXTROSE MONOHYDRATE 100 MG/ML
INJECTION, SOLUTION INTRAVENOUS CONTINUOUS PRN
Status: DISCONTINUED | OUTPATIENT
Start: 2024-09-13 | End: 2024-09-29 | Stop reason: HOSPADM

## 2024-09-13 RX ORDER — POTASSIUM CHLORIDE 7.45 MG/ML
10 INJECTION INTRAVENOUS PRN
Status: DISCONTINUED | OUTPATIENT
Start: 2024-09-13 | End: 2024-09-29 | Stop reason: HOSPADM

## 2024-09-13 RX ORDER — ACETAMINOPHEN 650 MG/1
650 SUPPOSITORY RECTAL EVERY 6 HOURS PRN
Status: DISCONTINUED | OUTPATIENT
Start: 2024-09-13 | End: 2024-09-29 | Stop reason: HOSPADM

## 2024-09-13 RX ORDER — IPRATROPIUM BROMIDE AND ALBUTEROL SULFATE 2.5; .5 MG/3ML; MG/3ML
1 SOLUTION RESPIRATORY (INHALATION)
Status: DISCONTINUED | OUTPATIENT
Start: 2024-09-13 | End: 2024-09-13

## 2024-09-13 RX ORDER — ONDANSETRON 4 MG/1
4 TABLET, ORALLY DISINTEGRATING ORAL EVERY 8 HOURS PRN
Status: DISCONTINUED | OUTPATIENT
Start: 2024-09-13 | End: 2024-09-13

## 2024-09-13 RX ADMIN — PANCRELIPASE LIPASE, PANCRELIPASE PROTEASE, PANCRELIPASE AMYLASE 40000 UNITS: 20000; 63000; 84000 CAPSULE, DELAYED RELEASE ORAL at 12:10

## 2024-09-13 RX ADMIN — SODIUM BICARBONATE: 84 INJECTION, SOLUTION INTRAVENOUS at 04:38

## 2024-09-13 RX ADMIN — TAMSULOSIN HYDROCHLORIDE 0.4 MG: 0.4 CAPSULE ORAL at 20:19

## 2024-09-13 RX ADMIN — PANCRELIPASE LIPASE, PANCRELIPASE PROTEASE, PANCRELIPASE AMYLASE 30000 UNITS: 15000; 47000; 63000 CAPSULE, DELAYED RELEASE ORAL at 12:10

## 2024-09-13 RX ADMIN — SODIUM BICARBONATE 1300 MG: 650 TABLET ORAL at 12:08

## 2024-09-13 RX ADMIN — CEFEPIME 2000 MG: 2 INJECTION, POWDER, FOR SOLUTION INTRAVENOUS at 05:36

## 2024-09-13 RX ADMIN — PANCRELIPASE LIPASE, PANCRELIPASE PROTEASE, PANCRELIPASE AMYLASE 20000 UNITS: 20000; 63000; 84000 CAPSULE, DELAYED RELEASE ORAL at 17:38

## 2024-09-13 RX ADMIN — INSULIN LISPRO 1 UNITS: 100 INJECTION, SOLUTION INTRAVENOUS; SUBCUTANEOUS at 17:33

## 2024-09-13 RX ADMIN — SODIUM CHLORIDE, PRESERVATIVE FREE 10 ML: 5 INJECTION INTRAVENOUS at 08:58

## 2024-09-13 RX ADMIN — SODIUM PHOSPHATE, DIBASIC AND SODIUM PHOSPHATE, MONOBASIC 1 ENEMA: 7; 19 ENEMA RECTAL at 01:19

## 2024-09-13 RX ADMIN — GUAIFENESIN 600 MG: 600 TABLET ORAL at 20:19

## 2024-09-13 RX ADMIN — VANCOMYCIN HYDROCHLORIDE 1250 MG: 5 INJECTION, POWDER, LYOPHILIZED, FOR SOLUTION INTRAVENOUS at 06:34

## 2024-09-13 RX ADMIN — PANCRELIPASE LIPASE, PANCRELIPASE PROTEASE, PANCRELIPASE AMYLASE 30000 UNITS: 15000; 47000; 63000 CAPSULE, DELAYED RELEASE ORAL at 09:00

## 2024-09-13 RX ADMIN — HYDROMORPHONE HYDROCHLORIDE 0.5 MG: 1 INJECTION, SOLUTION INTRAMUSCULAR; INTRAVENOUS; SUBCUTANEOUS at 07:10

## 2024-09-13 RX ADMIN — PANCRELIPASE LIPASE, PANCRELIPASE PROTEASE, PANCRELIPASE AMYLASE 5000 UNITS: 5000; 17000; 24000 CAPSULE, DELAYED RELEASE ORAL at 17:38

## 2024-09-13 RX ADMIN — GUAIFENESIN 600 MG: 600 TABLET ORAL at 08:36

## 2024-09-13 RX ADMIN — CEFEPIME 2000 MG: 2 INJECTION, POWDER, FOR SOLUTION INTRAVENOUS at 17:28

## 2024-09-13 RX ADMIN — PANCRELIPASE LIPASE, PANCRELIPASE PROTEASE, PANCRELIPASE AMYLASE 40000 UNITS: 20000; 63000; 84000 CAPSULE, DELAYED RELEASE ORAL at 08:59

## 2024-09-13 RX ADMIN — HYDROMORPHONE HYDROCHLORIDE 1 MG: 1 INJECTION, SOLUTION INTRAMUSCULAR; INTRAVENOUS; SUBCUTANEOUS at 12:18

## 2024-09-13 RX ADMIN — CHOLESTYRAMINE 4 G: 4 POWDER, FOR SUSPENSION ORAL at 08:36

## 2024-09-13 RX ADMIN — FENTANYL CITRATE 50 MCG: 0.05 INJECTION, SOLUTION INTRAMUSCULAR; INTRAVENOUS at 01:35

## 2024-09-13 RX ADMIN — Medication 3 MG: at 20:19

## 2024-09-13 RX ADMIN — Medication 3 MG: at 01:35

## 2024-09-13 RX ADMIN — Medication 125 MG: at 18:32

## 2024-09-13 RX ADMIN — SODIUM CHLORIDE, PRESERVATIVE FREE 10 ML: 5 INJECTION INTRAVENOUS at 20:20

## 2024-09-13 RX ADMIN — HYDROMORPHONE HYDROCHLORIDE 0.5 MG: 1 INJECTION, SOLUTION INTRAMUSCULAR; INTRAVENOUS; SUBCUTANEOUS at 02:40

## 2024-09-13 RX ADMIN — VANCOMYCIN HYDROCHLORIDE 125 MG: 125 CAPSULE ORAL at 12:09

## 2024-09-13 RX ADMIN — INSULIN GLARGINE 18 UNITS: 100 INJECTION, SOLUTION SUBCUTANEOUS at 08:36

## 2024-09-13 RX ADMIN — WARFARIN SODIUM 2.5 MG: 2.5 TABLET ORAL at 17:34

## 2024-09-13 RX ADMIN — TAMSULOSIN HYDROCHLORIDE 0.4 MG: 0.4 CAPSULE ORAL at 01:34

## 2024-09-13 RX ADMIN — SODIUM CHLORIDE: 9 INJECTION, SOLUTION INTRAVENOUS at 18:26

## 2024-09-13 RX ADMIN — SODIUM BICARBONATE 1300 MG: 650 TABLET ORAL at 20:19

## 2024-09-13 RX ADMIN — HYDROMORPHONE HYDROCHLORIDE 1 MG: 1 INJECTION, SOLUTION INTRAMUSCULAR; INTRAVENOUS; SUBCUTANEOUS at 20:19

## 2024-09-13 RX ADMIN — Medication 125 MG: at 23:20

## 2024-09-13 RX ADMIN — TRAZODONE HYDROCHLORIDE 50 MG: 50 TABLET ORAL at 23:19

## 2024-09-13 ASSESSMENT — PAIN DESCRIPTION - LOCATION
LOCATION: ABDOMEN
LOCATION: ABDOMEN;LEG
LOCATION: GENERALIZED
LOCATION: ABDOMEN

## 2024-09-13 ASSESSMENT — PAIN DESCRIPTION - ORIENTATION
ORIENTATION: RIGHT;LEFT;MID
ORIENTATION: ANTERIOR
ORIENTATION: ANTERIOR
ORIENTATION: OTHER (COMMENT)

## 2024-09-13 ASSESSMENT — PAIN SCALES - GENERAL
PAINLEVEL_OUTOF10: 6
PAINLEVEL_OUTOF10: 8
PAINLEVEL_OUTOF10: 7
PAINLEVEL_OUTOF10: 7
PAINLEVEL_OUTOF10: 9
PAINLEVEL_OUTOF10: 8

## 2024-09-13 ASSESSMENT — PAIN DESCRIPTION - DESCRIPTORS
DESCRIPTORS: ACHING
DESCRIPTORS: ACHING

## 2024-09-13 ASSESSMENT — PAIN DESCRIPTION - FREQUENCY
FREQUENCY: CONTINUOUS
FREQUENCY: CONTINUOUS

## 2024-09-13 ASSESSMENT — PAIN DESCRIPTION - ONSET
ONSET: ON-GOING
ONSET: ON-GOING

## 2024-09-13 ASSESSMENT — PAIN DESCRIPTION - PAIN TYPE: TYPE: CHRONIC PAIN

## 2024-09-13 NOTE — H&P
to prolonged immobilization  Recently diagnosed with right lobe pulmonary embolism on 7/28/2024 at [facility]   Patient was initiated on Coumadin therapy at that time with a goal INR of 2-3  INR upon arrival subtherapeutic, consult pharmacy to dose Coumadin    Chronic pancreatitis  Patient typically on Creon 72,000 units 3 times daily  Start Zenpep 75,000 units 3 times daily with meals    Consultations:   IP CONSULT TO INTERNAL MEDICINE  IP CONSULT TO NEPHROLOGY  IP CONSULT TO SOCIAL WORK  PHARMACY TO DOSE WARFARIN    Patient is admitted as inpatient status because of co-morbidities listed above, severity of signs and symptoms as outlined, requirement for current medical therapies and most importantly because of direct risk to patient if care not provided in a hospital setting.  Expected length of stay > 48 hours.    MARCELO Flores CNP  9/12/2024  10:34 PM    Copy sent to Jono Loyd, MARCELO - NP

## 2024-09-13 NOTE — ED NOTES
Pt urinating per self with urinal with no difficulties. He is able to hold urinal to groin area with no assistance.

## 2024-09-13 NOTE — ED PROVIDER NOTES
is agreeable to placement during this admission.    CRITICAL CARE: There was a high probability of clinically significant/life threatening deterioration in this patient's condition which required my urgent intervention.  Total critical care time was 60 minutes.  This excludes any time for separately reportable procedures.       DIAGNOSTIC RESULTS       RADIOLOGY:All plain film, CT, MRI, and formal ultrasound images (except ED bedside ultrasound) are read by the radiologist, see reports below, unless otherwisenoted in MDM or here.  XR CHEST PORTABLE   Final Result   Possible mild bibasilar interstitial infiltrates         CT ABDOMEN PELVIS W IV CONTRAST Additional Contrast? None   Final Result   1. Is no evidence of bowel obstruction.  Decompressed colon and rectum.   Interval placement of a right ureteral stent. No hydronephrosis.   2. Urothelial thickening and enhancement involving the left renal pelvis and   proximal left ureter, which may be infectious or inflammatory in etiology.   Correlation with urinalysis is recommended.   3. Bilateral nonobstructive nephrolithiasis.   4. Large hiatal hernia.   5. Tree-in-bud opacities within the bilateral lung bases with mucous plugging   in the right lower lobe, which may be related to bronchiolitis or aspiration.   6. Small right pleural effusion.           LABS: All lab results were reviewed by myself, and all abnormals are listed below.  Labs Reviewed   CBC WITH AUTO DIFFERENTIAL - Abnormal; Notable for the following components:       Result Value    WBC 20.4 (*)     RBC 3.67 (*)     Hemoglobin 10.7 (*)     Hematocrit 34.5 (*)     Platelets 783 (*)     Neutrophils % 85 (*)     Lymphocytes % 10 (*)     Immature Granulocytes % 1 (*)     Neutrophils Absolute 17.40 (*)     All other components within normal limits   COMPREHENSIVE METABOLIC PANEL - Abnormal; Notable for the following components:    Sodium 130 (*)     CO2 14 (*)     Glucose 178 (*)     BUN 30 (*)

## 2024-09-13 NOTE — ED NOTES
Patient called out for assistance with bed pan. Writer educated patient she would be in as soon as she completes care with another patient. Patient then called out again yelling over call light stating \"I need to get on the fucking bedpan.\" Writer hung up the phone to enter room.     Writer entered room to educate patient that this is a full ED facility and writer has patient care other than himself. Patient became verbally aggressive with writer stating \"If I shit on myself it's going to be your fault\" Writer reminded patient that she is here to care for him and not to talk to medical staff in this manner. At this time writer witnessed patient urinating on himself after he previously was able to urinate per self prior. Pt denied urinating on himself although visible urine was on sheets and bedding. Writer aggreed with patient and removed coverings to show puddle. Patient reported he was unable to hold the urinal up himself and it fell over.     Reymundo MURCIA entered room for assessment with patient. At this time writer reported situation of GI distrubances that patient has been having. Reymundo MURCIA provided education to patient that we need to give the colace time to work. Reymundo MURCIA provided risks and benefits to patient of insertion of dailey catheter of which pt denied at this time as he reported he is able to urinate himself with no difficulties. Patient at this time stated to Reymundo \"Do you know the risks of being overweight can cause you to die and cause heart problems.\" At this time writer intervened and stated \"you can not talk to women like that, we are here to help you and you will not talk to women like that at any point during your stay here.\" Patient removed off of bed pan and educated on how to use urinal for a second time.   Patient continued with verbal statement as writer walked out the room to obtain new brief, sheets to change patient for urination on bedding.     Patient then cleaned up, placed on

## 2024-09-13 NOTE — ED NOTES
ED to inpatient nurses report     Chief Complaint   Patient presents with    GI Problem     Patient states he is constipated, took laxatives and had liquid stool.     Urinary Retention     Has not urinated since yesterday.       Present to ED from Home via transportation from friend.     LOC: alert and orientated to name, place, date  Vital signs   Vitals:    09/12/24 1701 09/12/24 2053   BP: 112/66    Pulse: 97    Resp: 16 18   Temp: 97.6 °F (36.4 °C)    SpO2: 96%    Weight: 54.4 kg (120 lb)    Height: 1.854 m (6' 1\")       Oxygen Baseline Room air    Current needs required None   SEPSIS:   [] Lactate X 2 ordered (Yes or No)  [] Antibiotics given (Yes or No)  [] IV Fluids ordered (Yes or No)             [] 2nd IV completed (Yes or No)  [] Hourly Vital Signs (Validated)  [] Outstanding Orders:     LDAs:   Peripheral IV 09/12/24 Right Antecubital (Active)   Site Assessment Clean, dry & intact 09/12/24 1743   Line Status Blood return noted 09/12/24 1743   Phlebitis Assessment No symptoms 09/12/24 1743   Infiltration Assessment 0 09/12/24 1743   Alcohol Cap Used No 09/12/24 1743   Dressing Status New dressing applied;Clean, dry & intact 09/12/24 1743   Dressing Type Transparent 09/12/24 1743       Peripheral IV 09/12/24 Left Forearm (Active)   Site Assessment Clean, dry & intact 09/12/24 2142   Line Status Blood return noted 09/12/24 2142   Phlebitis Assessment No symptoms 09/12/24 2142     Mobility: Fully dependent  Fall Risk:    Pending ED orders: None  Present condition: Stable  Code Status: Full  Consults: IP CONSULT TO INTERNAL MEDICINE  IP CONSULT TO NEPHROLOGY  IP CONSULT TO SOCIAL WORK  PHARMACY TO DOSE WARFARIN  []  Hospitalist  Completed  [] yes [] no Who:   [x]  Medicine  Completed  [x] yes [] No Who:   []  Cardiology  Completed  [] yes [] No Who:   []  GI   Completed  [] yes [] No Who:   []  Neurology  Completed  [] yes [] No Who:   [x]  Nephrology Completed  [x] yes [] No Who:    []  Vascular  Completed

## 2024-09-13 NOTE — RT PROTOCOL NOTE
with Frequency of every 4 hours PRN for wheezing or increased work of breathing using Per Protocol order mode.        4-6 - enter or revise RT Bronchodilator order(s) to two equivalent RT bronchodilator orders with one order with BID Frequency and one order with Frequency of every 4 hours PRN wheezing or increased work of breathing using Per Protocol order mode.        7-10 - enter or revise RT Bronchodilator order(s) to two equivalent RT bronchodilator orders with one order with TID Frequency and one order with Frequency of every 4 hours PRN wheezing or increased work of breathing using Per Protocol order mode.       11-13 - enter or revise RT Bronchodilator order(s) to one equivalent RT bronchodilator order with QID Frequency and an Albuterol order with Frequency of every 4 hours PRN wheezing or increased work of breathing using Per Protocol order mode.      Greater than 13 - enter or revise RT Bronchodilator order(s) to one equivalent RT bronchodilator order with every 4 hours Frequency and an Albuterol order with Frequency of every 2 hours PRN wheezing or increased work of breathing using Per Protocol order mode.     RT to enter RT Home Evaluation for COPD & MDI Assessment order using Per Protocol order mode.    Electronically signed by Tracy Lorenzo RCP on 9/13/2024 at 12:44 AM

## 2024-09-13 NOTE — CARE COORDINATION
Case Management Assessment  Initial Evaluation    Date/Time of Evaluation: 9/13/2024 2:50 PM  Assessment Completed by: Elke Noriega RN    If patient is discharged prior to next notation, then this note serves as note for discharge by case management.    Patient Name: Nicolas Cardenas                   YOB: 1955  Diagnosis: Urinary retention [R33.9]  Hyponatremia [E87.1]  LUMA (acute kidney injury) (HCC) [N17.9]  Sepsis (HCC) [A41.9]  Urinary tract infection without hematuria, site unspecified [N39.0]  Unable to care for self [Z78.9]                   Date / Time: 9/12/2024  5:04 PM    Patient Admission Status: Inpatient   Readmission Risk (Low < 19, Mod (19-27), High > 27): Readmission Risk Score: 19.2    Current PCP: Rosa Pond APRN - NP  PCP verified by CM? Yes    Chart Reviewed: Yes      History Provided by: Patient  Patient Orientation: Alert and Oriented    Patient Cognition: Alert    Hospitalization in the last 30 days (Readmission):  No    If yes, Readmission Assessment in CM Navigator will be completed.    Advance Directives:      Code Status: Full Code   Patient's Primary Decision Maker is: Named in Scanned ACP Document    Primary Decision Maker: Marisela Ramirez - Child - [phone]    Discharge Planning:    Patient lives with: Alone Type of Home: House  Primary Care Giver: Self  Patient Support Systems include: [employer]   Current Financial resources: Medicare  Current community resources:    Current services prior to admission: Durable Medical Equipment, Meals On Wheels            Current DME: Walker, Wheelchair, Shower Chair (electric wc)            Type of Home Care services:  None    ADLS  Prior functional level: Assistance with the following:, Cooking, Housework, Shopping, Mobility, Bathing  Current functional level: Assistance with the following:, Cooking, Housework, Shopping, Mobility, Bathing    PT AM-PAC:   /24  OT AM-PAC:   /24    Family can provide assistance
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
## 2024-09-14 ENCOUNTER — APPOINTMENT (OUTPATIENT)
Dept: GENERAL RADIOLOGY | Age: 69
DRG: 854 | End: 2024-09-14
Payer: COMMERCIAL

## 2024-09-14 LAB
ANION GAP SERPL CALCULATED.3IONS-SCNC: 13 MMOL/L (ref 9–17)
BASOPHILS # BLD: 0.12 K/UL (ref 0–0.2)
BASOPHILS NFR BLD: 1 % (ref 0–2)
BUN SERPL-MCNC: 20 MG/DL (ref 8–23)
BUN/CREAT SERPL: 18 (ref 9–20)
C DIFF GDH + TOXINS A+B STL QL IA.RAPID: ABNORMAL
C DIFFICILE TOXINS, PCR: ABNORMAL
CALCIUM SERPL-MCNC: 9.3 MG/DL (ref 8.6–10.4)
CHLORIDE SERPL-SCNC: 110 MMOL/L (ref 98–107)
CO2 SERPL-SCNC: 17 MMOL/L (ref 20–31)
CREAT SERPL-MCNC: 1.1 MG/DL (ref 0.7–1.2)
EOSINOPHIL # BLD: 0.42 K/UL (ref 0–0.44)
EOSINOPHILS RELATIVE PERCENT: 4 % (ref 1–4)
ERYTHROCYTE [DISTWIDTH] IN BLOOD BY AUTOMATED COUNT: 14 % (ref 11.8–14.4)
GFR, ESTIMATED: 74 ML/MIN/1.73M2
GLUCOSE BLD-MCNC: 126 MG/DL (ref 75–110)
GLUCOSE BLD-MCNC: 215 MG/DL (ref 75–110)
GLUCOSE BLD-MCNC: 220 MG/DL (ref 75–110)
GLUCOSE BLD-MCNC: 262 MG/DL (ref 75–110)
GLUCOSE SERPL-MCNC: 151 MG/DL (ref 70–99)
HCT VFR BLD AUTO: 30.5 % (ref 40.7–50.3)
HGB BLD-MCNC: 9.1 G/DL (ref 13–17)
IMM GRANULOCYTES # BLD AUTO: 0.05 K/UL (ref 0–0.3)
IMM GRANULOCYTES NFR BLD: 0 %
INR PPP: 1.7
LYMPHOCYTES NFR BLD: 1.54 K/UL (ref 1.1–3.7)
LYMPHOCYTES RELATIVE PERCENT: 13 % (ref 24–43)
MCH RBC QN AUTO: 28.4 PG (ref 25.2–33.5)
MCHC RBC AUTO-ENTMCNC: 29.8 G/DL (ref 28.4–34.8)
MCV RBC AUTO: 95.3 FL (ref 82.6–102.9)
MONOCYTES NFR BLD: 0.69 K/UL (ref 0.1–1.2)
MONOCYTES NFR BLD: 6 % (ref 3–12)
NEUTROPHILS NFR BLD: 76 % (ref 36–65)
NEUTS SEG NFR BLD: 9.28 K/UL (ref 1.5–8.1)
NRBC BLD-RTO: 0 PER 100 WBC
PLATELET # BLD AUTO: 686 K/UL (ref 138–453)
PMV BLD AUTO: 9 FL (ref 8.1–13.5)
POTASSIUM SERPL-SCNC: 3.9 MMOL/L (ref 3.7–5.3)
PROTHROMBIN TIME: 19.7 SEC (ref 11.5–14.2)
RBC # BLD AUTO: 3.2 M/UL (ref 4.21–5.77)
SODIUM SERPL-SCNC: 140 MMOL/L (ref 135–144)
SPECIMEN DESCRIPTION: ABNORMAL
SPECIMEN DESCRIPTION: ABNORMAL
WBC OTHER # BLD: 12.1 K/UL (ref 3.5–11.3)

## 2024-09-14 PROCEDURE — 71045 X-RAY EXAM CHEST 1 VIEW: CPT

## 2024-09-14 PROCEDURE — 6370000000 HC RX 637 (ALT 250 FOR IP): Performed by: NURSE PRACTITIONER

## 2024-09-14 PROCEDURE — 82947 ASSAY GLUCOSE BLOOD QUANT: CPT

## 2024-09-14 PROCEDURE — 83516 IMMUNOASSAY NONANTIBODY: CPT

## 2024-09-14 PROCEDURE — 82784 ASSAY IGA/IGD/IGG/IGM EACH: CPT

## 2024-09-14 PROCEDURE — 6360000002 HC RX W HCPCS: Performed by: INTERNAL MEDICINE

## 2024-09-14 PROCEDURE — 6370000000 HC RX 637 (ALT 250 FOR IP): Performed by: INTERNAL MEDICINE

## 2024-09-14 PROCEDURE — 6370000000 HC RX 637 (ALT 250 FOR IP)

## 2024-09-14 PROCEDURE — 2060000000 HC ICU INTERMEDIATE R&B

## 2024-09-14 PROCEDURE — 80048 BASIC METABOLIC PNL TOTAL CA: CPT

## 2024-09-14 PROCEDURE — 94761 N-INVAS EAR/PLS OXIMETRY MLT: CPT

## 2024-09-14 PROCEDURE — 85610 PROTHROMBIN TIME: CPT

## 2024-09-14 PROCEDURE — 99232 SBSQ HOSP IP/OBS MODERATE 35: CPT | Performed by: INTERNAL MEDICINE

## 2024-09-14 PROCEDURE — 2580000003 HC RX 258

## 2024-09-14 PROCEDURE — 85025 COMPLETE CBC W/AUTO DIFF WBC: CPT

## 2024-09-14 PROCEDURE — 36415 COLL VENOUS BLD VENIPUNCTURE: CPT

## 2024-09-14 PROCEDURE — 6360000002 HC RX W HCPCS: Performed by: NURSE PRACTITIONER

## 2024-09-14 PROCEDURE — 6360000002 HC RX W HCPCS

## 2024-09-14 RX ORDER — DIPHENHYDRAMINE HYDROCHLORIDE 50 MG/ML
25 INJECTION INTRAMUSCULAR; INTRAVENOUS NIGHTLY PRN
Status: DISCONTINUED | OUTPATIENT
Start: 2024-09-14 | End: 2024-09-29 | Stop reason: HOSPADM

## 2024-09-14 RX ORDER — DICYCLOMINE HYDROCHLORIDE 10 MG/1
20 CAPSULE ORAL
Status: DISCONTINUED | OUTPATIENT
Start: 2024-09-14 | End: 2024-09-18

## 2024-09-14 RX ORDER — FENTANYL CITRATE 0.05 MG/ML
25 INJECTION, SOLUTION INTRAMUSCULAR; INTRAVENOUS
Status: DISCONTINUED | OUTPATIENT
Start: 2024-09-14 | End: 2024-09-17

## 2024-09-14 RX ORDER — WARFARIN SODIUM 5 MG/1
5 TABLET ORAL
Status: COMPLETED | OUTPATIENT
Start: 2024-09-14 | End: 2024-09-14

## 2024-09-14 RX ORDER — FENTANYL CITRATE 0.05 MG/ML
50 INJECTION, SOLUTION INTRAMUSCULAR; INTRAVENOUS
Status: DISCONTINUED | OUTPATIENT
Start: 2024-09-14 | End: 2024-09-17

## 2024-09-14 RX ADMIN — ONDANSETRON 4 MG: 2 INJECTION, SOLUTION INTRAMUSCULAR; INTRAVENOUS at 11:48

## 2024-09-14 RX ADMIN — INSULIN LISPRO 2 UNITS: 100 INJECTION, SOLUTION INTRAVENOUS; SUBCUTANEOUS at 12:19

## 2024-09-14 RX ADMIN — WARFARIN SODIUM 5 MG: 5 TABLET ORAL at 18:35

## 2024-09-14 RX ADMIN — INSULIN GLARGINE 18 UNITS: 100 INJECTION, SOLUTION SUBCUTANEOUS at 08:22

## 2024-09-14 RX ADMIN — HYDROMORPHONE HYDROCHLORIDE 1 MG: 1 INJECTION, SOLUTION INTRAMUSCULAR; INTRAVENOUS; SUBCUTANEOUS at 13:51

## 2024-09-14 RX ADMIN — DICYCLOMINE HYDROCHLORIDE 20 MG: 10 CAPSULE ORAL at 19:49

## 2024-09-14 RX ADMIN — FENTANYL CITRATE 50 MCG: 0.05 INJECTION, SOLUTION INTRAMUSCULAR; INTRAVENOUS at 18:33

## 2024-09-14 RX ADMIN — Medication 125 MG: at 05:25

## 2024-09-14 RX ADMIN — CHOLESTYRAMINE 4 G: 4 POWDER, FOR SUSPENSION ORAL at 08:22

## 2024-09-14 RX ADMIN — Medication 125 MG: at 12:20

## 2024-09-14 RX ADMIN — PANCRELIPASE LIPASE, PANCRELIPASE PROTEASE, PANCRELIPASE AMYLASE 20000 UNITS: 20000; 63000; 84000 CAPSULE, DELAYED RELEASE ORAL at 17:03

## 2024-09-14 RX ADMIN — Medication 125 MG: at 22:55

## 2024-09-14 RX ADMIN — PANCRELIPASE LIPASE, PANCRELIPASE PROTEASE, PANCRELIPASE AMYLASE 5000 UNITS: 5000; 17000; 24000 CAPSULE, DELAYED RELEASE ORAL at 17:03

## 2024-09-14 RX ADMIN — CEFEPIME 2000 MG: 2 INJECTION, POWDER, FOR SOLUTION INTRAVENOUS at 05:26

## 2024-09-14 RX ADMIN — TAMSULOSIN HYDROCHLORIDE 0.4 MG: 0.4 CAPSULE ORAL at 19:48

## 2024-09-14 RX ADMIN — PANCRELIPASE LIPASE, PANCRELIPASE PROTEASE, PANCRELIPASE AMYLASE 20000 UNITS: 20000; 63000; 84000 CAPSULE, DELAYED RELEASE ORAL at 08:21

## 2024-09-14 RX ADMIN — PANCRELIPASE LIPASE, PANCRELIPASE PROTEASE, PANCRELIPASE AMYLASE 5000 UNITS: 5000; 17000; 24000 CAPSULE, DELAYED RELEASE ORAL at 08:21

## 2024-09-14 RX ADMIN — DIPHENHYDRAMINE HYDROCHLORIDE 25 MG: 50 INJECTION INTRAMUSCULAR; INTRAVENOUS at 21:06

## 2024-09-14 RX ADMIN — SODIUM CHLORIDE, PRESERVATIVE FREE 10 ML: 5 INJECTION INTRAVENOUS at 19:48

## 2024-09-14 RX ADMIN — GUAIFENESIN 600 MG: 600 TABLET ORAL at 08:21

## 2024-09-14 RX ADMIN — FENTANYL CITRATE 50 MCG: 0.05 INJECTION, SOLUTION INTRAMUSCULAR; INTRAVENOUS at 22:54

## 2024-09-14 RX ADMIN — GUAIFENESIN 600 MG: 600 TABLET ORAL at 19:48

## 2024-09-14 RX ADMIN — Medication 125 MG: at 17:05

## 2024-09-14 RX ADMIN — INSULIN LISPRO 1 UNITS: 100 INJECTION, SOLUTION INTRAVENOUS; SUBCUTANEOUS at 17:04

## 2024-09-14 RX ADMIN — SODIUM CHLORIDE, PRESERVATIVE FREE 10 ML: 5 INJECTION INTRAVENOUS at 08:22

## 2024-09-14 RX ADMIN — SODIUM BICARBONATE 1300 MG: 650 TABLET ORAL at 08:21

## 2024-09-14 RX ADMIN — DICYCLOMINE HYDROCHLORIDE 20 MG: 10 CAPSULE ORAL at 17:05

## 2024-09-14 RX ADMIN — PANCRELIPASE LIPASE, PANCRELIPASE PROTEASE, PANCRELIPASE AMYLASE 5000 UNITS: 5000; 17000; 24000 CAPSULE, DELAYED RELEASE ORAL at 12:19

## 2024-09-14 RX ADMIN — DICYCLOMINE HYDROCHLORIDE 20 MG: 10 CAPSULE ORAL at 10:13

## 2024-09-14 RX ADMIN — SODIUM BICARBONATE 1300 MG: 650 TABLET ORAL at 19:48

## 2024-09-14 RX ADMIN — HYDROMORPHONE HYDROCHLORIDE 1 MG: 1 INJECTION, SOLUTION INTRAMUSCULAR; INTRAVENOUS; SUBCUTANEOUS at 00:47

## 2024-09-14 RX ADMIN — PANCRELIPASE LIPASE, PANCRELIPASE PROTEASE, PANCRELIPASE AMYLASE 20000 UNITS: 20000; 63000; 84000 CAPSULE, DELAYED RELEASE ORAL at 12:19

## 2024-09-14 RX ADMIN — HYDROMORPHONE HYDROCHLORIDE 1 MG: 1 INJECTION, SOLUTION INTRAMUSCULAR; INTRAVENOUS; SUBCUTANEOUS at 08:50

## 2024-09-14 ASSESSMENT — PAIN DESCRIPTION - DESCRIPTORS
DESCRIPTORS: CRAMPING
DESCRIPTORS: STABBING
DESCRIPTORS: STABBING
DESCRIPTORS: SHARP
DESCRIPTORS: CRAMPING;ACHING
DESCRIPTORS: SHARP

## 2024-09-14 ASSESSMENT — PAIN DESCRIPTION - LOCATION
LOCATION: ABDOMEN;BUTTOCKS
LOCATION: ABDOMEN

## 2024-09-14 ASSESSMENT — PAIN SCALES - GENERAL
PAINLEVEL_OUTOF10: 7
PAINLEVEL_OUTOF10: 10
PAINLEVEL_OUTOF10: 8
PAINLEVEL_OUTOF10: 8
PAINLEVEL_OUTOF10: 9
PAINLEVEL_OUTOF10: 9
PAINLEVEL_OUTOF10: 6
PAINLEVEL_OUTOF10: 4
PAINLEVEL_OUTOF10: 10
PAINLEVEL_OUTOF10: 9
PAINLEVEL_OUTOF10: 8
PAINLEVEL_OUTOF10: 4
PAINLEVEL_OUTOF10: 9

## 2024-09-14 ASSESSMENT — PAIN - FUNCTIONAL ASSESSMENT
PAIN_FUNCTIONAL_ASSESSMENT: ACTIVITIES ARE NOT PREVENTED

## 2024-09-14 ASSESSMENT — PAIN DESCRIPTION - ORIENTATION
ORIENTATION: LEFT;MID;RIGHT
ORIENTATION: RIGHT;LEFT;MID
ORIENTATION: MID;LEFT;RIGHT
ORIENTATION: LEFT;RIGHT;MID
ORIENTATION: RIGHT;LEFT;MID

## 2024-09-15 ENCOUNTER — APPOINTMENT (OUTPATIENT)
Dept: GENERAL RADIOLOGY | Age: 69
DRG: 854 | End: 2024-09-15
Payer: COMMERCIAL

## 2024-09-15 LAB
ANION GAP SERPL CALCULATED.3IONS-SCNC: 12 MMOL/L (ref 9–17)
BASOPHILS # BLD: 0.13 K/UL (ref 0–0.2)
BASOPHILS NFR BLD: 1 % (ref 0–2)
BUN SERPL-MCNC: 21 MG/DL (ref 8–23)
BUN/CREAT SERPL: 19 (ref 9–20)
CALCIUM SERPL-MCNC: 9.2 MG/DL (ref 8.6–10.4)
CHLORIDE SERPL-SCNC: 111 MMOL/L (ref 98–107)
CHLORIDE UR-SCNC: 119 MMOL/L
CO2 SERPL-SCNC: 19 MMOL/L (ref 20–31)
CREAT SERPL-MCNC: 1.1 MG/DL (ref 0.7–1.2)
EOSINOPHIL # BLD: 0.34 K/UL (ref 0–0.44)
EOSINOPHILS RELATIVE PERCENT: 3 % (ref 1–4)
ERYTHROCYTE [DISTWIDTH] IN BLOOD BY AUTOMATED COUNT: 14 % (ref 11.8–14.4)
GFR, ESTIMATED: 74 ML/MIN/1.73M2
GLUCOSE BLD-MCNC: 100 MG/DL (ref 75–110)
GLUCOSE BLD-MCNC: 150 MG/DL (ref 75–110)
GLUCOSE BLD-MCNC: 180 MG/DL (ref 75–110)
GLUCOSE BLD-MCNC: 261 MG/DL (ref 75–110)
GLUCOSE SERPL-MCNC: 113 MG/DL (ref 70–99)
HCT VFR BLD AUTO: 31.9 % (ref 40.7–50.3)
HGB BLD-MCNC: 9.8 G/DL (ref 13–17)
IMM GRANULOCYTES # BLD AUTO: 0.06 K/UL (ref 0–0.3)
IMM GRANULOCYTES NFR BLD: 1 %
INR PPP: 1.7
LYMPHOCYTES NFR BLD: 2.66 K/UL (ref 1.1–3.7)
LYMPHOCYTES RELATIVE PERCENT: 23 % (ref 24–43)
MAGNESIUM SERPL-MCNC: 2 MG/DL (ref 1.6–2.6)
MCH RBC QN AUTO: 28.8 PG (ref 25.2–33.5)
MCHC RBC AUTO-ENTMCNC: 30.7 G/DL (ref 28.4–34.8)
MCV RBC AUTO: 93.8 FL (ref 82.6–102.9)
MONOCYTES NFR BLD: 0.77 K/UL (ref 0.1–1.2)
MONOCYTES NFR BLD: 7 % (ref 3–12)
NEUTROPHILS NFR BLD: 65 % (ref 36–65)
NEUTS SEG NFR BLD: 7.67 K/UL (ref 1.5–8.1)
NRBC BLD-RTO: 0 PER 100 WBC
O+P STL CONC: NORMAL
OSMOLALITY UR: 420 MOSM/KG (ref 80–1300)
PHOSPHATE SERPL-MCNC: 3.5 MG/DL (ref 2.5–4.5)
PLATELET # BLD AUTO: 664 K/UL (ref 138–453)
PMV BLD AUTO: 8.8 FL (ref 8.1–13.5)
POTASSIUM SERPL-SCNC: 4.2 MMOL/L (ref 3.7–5.3)
POTASSIUM, UR: 26.1 MMOL/L
PROTHROMBIN TIME: 20.3 SEC (ref 11.5–14.2)
RBC # BLD AUTO: 3.4 M/UL (ref 4.21–5.77)
SODIUM SERPL-SCNC: 142 MMOL/L (ref 135–144)
SODIUM UR-SCNC: 97 MMOL/L
SPECIMEN DESCRIPTION: NORMAL
WBC OTHER # BLD: 11.6 K/UL (ref 3.5–11.3)

## 2024-09-15 PROCEDURE — 83735 ASSAY OF MAGNESIUM: CPT

## 2024-09-15 PROCEDURE — 6360000002 HC RX W HCPCS: Performed by: INTERNAL MEDICINE

## 2024-09-15 PROCEDURE — 6370000000 HC RX 637 (ALT 250 FOR IP)

## 2024-09-15 PROCEDURE — 6370000000 HC RX 637 (ALT 250 FOR IP): Performed by: NURSE PRACTITIONER

## 2024-09-15 PROCEDURE — 94761 N-INVAS EAR/PLS OXIMETRY MLT: CPT

## 2024-09-15 PROCEDURE — 80048 BASIC METABOLIC PNL TOTAL CA: CPT

## 2024-09-15 PROCEDURE — 87449 NOS EACH ORGANISM AG IA: CPT

## 2024-09-15 PROCEDURE — 85025 COMPLETE CBC W/AUTO DIFF WBC: CPT

## 2024-09-15 PROCEDURE — 84300 ASSAY OF URINE SODIUM: CPT

## 2024-09-15 PROCEDURE — 85610 PROTHROMBIN TIME: CPT

## 2024-09-15 PROCEDURE — 6370000000 HC RX 637 (ALT 250 FOR IP): Performed by: INTERNAL MEDICINE

## 2024-09-15 PROCEDURE — 87324 CLOSTRIDIUM AG IA: CPT

## 2024-09-15 PROCEDURE — 99232 SBSQ HOSP IP/OBS MODERATE 35: CPT | Performed by: INTERNAL MEDICINE

## 2024-09-15 PROCEDURE — 84133 ASSAY OF URINE POTASSIUM: CPT

## 2024-09-15 PROCEDURE — 82653 EL-1 FECAL QUANTITATIVE: CPT

## 2024-09-15 PROCEDURE — 2580000003 HC RX 258

## 2024-09-15 PROCEDURE — 71045 X-RAY EXAM CHEST 1 VIEW: CPT

## 2024-09-15 PROCEDURE — 36415 COLL VENOUS BLD VENIPUNCTURE: CPT

## 2024-09-15 PROCEDURE — 82947 ASSAY GLUCOSE BLOOD QUANT: CPT

## 2024-09-15 PROCEDURE — 83993 ASSAY FOR CALPROTECTIN FECAL: CPT

## 2024-09-15 PROCEDURE — 87177 OVA AND PARASITES SMEARS: CPT

## 2024-09-15 PROCEDURE — 6360000002 HC RX W HCPCS: Performed by: NURSE PRACTITIONER

## 2024-09-15 PROCEDURE — 2060000000 HC ICU INTERMEDIATE R&B

## 2024-09-15 PROCEDURE — 83935 ASSAY OF URINE OSMOLALITY: CPT

## 2024-09-15 PROCEDURE — 87328 CRYPTOSPORIDIUM AG IA: CPT

## 2024-09-15 PROCEDURE — 82436 ASSAY OF URINE CHLORIDE: CPT

## 2024-09-15 PROCEDURE — 87209 SMEAR COMPLEX STAIN: CPT

## 2024-09-15 PROCEDURE — 87329 GIARDIA AG IA: CPT

## 2024-09-15 PROCEDURE — 84100 ASSAY OF PHOSPHORUS: CPT

## 2024-09-15 RX ORDER — ZOLPIDEM TARTRATE 5 MG/1
5 TABLET ORAL NIGHTLY PRN
Status: DISCONTINUED | OUTPATIENT
Start: 2024-09-15 | End: 2024-09-21

## 2024-09-15 RX ORDER — DIPHENHYDRAMINE HCL 25 MG
25 TABLET ORAL EVERY 6 HOURS PRN
Status: DISCONTINUED | OUTPATIENT
Start: 2024-09-15 | End: 2024-09-29 | Stop reason: HOSPADM

## 2024-09-15 RX ORDER — WARFARIN SODIUM 5 MG/1
5 TABLET ORAL
Status: COMPLETED | OUTPATIENT
Start: 2024-09-15 | End: 2024-09-15

## 2024-09-15 RX ORDER — VANCOMYCIN HYDROCHLORIDE 250 MG/1
250 CAPSULE ORAL EVERY 6 HOURS SCHEDULED
Status: DISCONTINUED | OUTPATIENT
Start: 2024-09-15 | End: 2024-09-15 | Stop reason: ALTCHOICE

## 2024-09-15 RX ADMIN — GUAIFENESIN 600 MG: 600 TABLET ORAL at 21:04

## 2024-09-15 RX ADMIN — DICYCLOMINE HYDROCHLORIDE 20 MG: 10 CAPSULE ORAL at 17:49

## 2024-09-15 RX ADMIN — PANCRELIPASE LIPASE, PANCRELIPASE PROTEASE, PANCRELIPASE AMYLASE 5000 UNITS: 5000; 17000; 24000 CAPSULE, DELAYED RELEASE ORAL at 09:00

## 2024-09-15 RX ADMIN — FENTANYL CITRATE 50 MCG: 0.05 INJECTION, SOLUTION INTRAMUSCULAR; INTRAVENOUS at 23:21

## 2024-09-15 RX ADMIN — FENTANYL CITRATE 50 MCG: 0.05 INJECTION, SOLUTION INTRAMUSCULAR; INTRAVENOUS at 21:04

## 2024-09-15 RX ADMIN — PANCRELIPASE LIPASE, PANCRELIPASE PROTEASE, PANCRELIPASE AMYLASE 20000 UNITS: 20000; 63000; 84000 CAPSULE, DELAYED RELEASE ORAL at 09:00

## 2024-09-15 RX ADMIN — DIPHENHYDRAMINE HYDROCHLORIDE 25 MG: 50 INJECTION INTRAMUSCULAR; INTRAVENOUS at 21:03

## 2024-09-15 RX ADMIN — PANCRELIPASE LIPASE, PANCRELIPASE PROTEASE, PANCRELIPASE AMYLASE 5000 UNITS: 5000; 17000; 24000 CAPSULE, DELAYED RELEASE ORAL at 17:49

## 2024-09-15 RX ADMIN — FENTANYL CITRATE 50 MCG: 0.05 INJECTION, SOLUTION INTRAMUSCULAR; INTRAVENOUS at 16:07

## 2024-09-15 RX ADMIN — SODIUM CHLORIDE, PRESERVATIVE FREE 10 ML: 5 INJECTION INTRAVENOUS at 08:22

## 2024-09-15 RX ADMIN — ZOLPIDEM TARTRATE 5 MG: 5 TABLET, COATED ORAL at 23:22

## 2024-09-15 RX ADMIN — DIPHENHYDRAMINE HCL 25 MG: 25 TABLET ORAL at 10:28

## 2024-09-15 RX ADMIN — FENTANYL CITRATE 50 MCG: 0.05 INJECTION, SOLUTION INTRAMUSCULAR; INTRAVENOUS at 13:17

## 2024-09-15 RX ADMIN — HYOSCYAMINE SULFATE 0.12 MG: 0.12 TABLET SUBLINGUAL at 10:39

## 2024-09-15 RX ADMIN — SODIUM CHLORIDE, PRESERVATIVE FREE 10 ML: 5 INJECTION INTRAVENOUS at 21:05

## 2024-09-15 RX ADMIN — DICYCLOMINE HYDROCHLORIDE 20 MG: 10 CAPSULE ORAL at 21:03

## 2024-09-15 RX ADMIN — FENTANYL CITRATE 50 MCG: 0.05 INJECTION, SOLUTION INTRAMUSCULAR; INTRAVENOUS at 02:50

## 2024-09-15 RX ADMIN — Medication 125 MG: at 06:43

## 2024-09-15 RX ADMIN — PANCRELIPASE LIPASE, PANCRELIPASE PROTEASE, PANCRELIPASE AMYLASE 20000 UNITS: 20000; 63000; 84000 CAPSULE, DELAYED RELEASE ORAL at 17:49

## 2024-09-15 RX ADMIN — TAMSULOSIN HYDROCHLORIDE 0.4 MG: 0.4 CAPSULE ORAL at 21:03

## 2024-09-15 RX ADMIN — ONDANSETRON 4 MG: 4 TABLET, ORALLY DISINTEGRATING ORAL at 13:43

## 2024-09-15 RX ADMIN — PANCRELIPASE LIPASE, PANCRELIPASE PROTEASE, PANCRELIPASE AMYLASE 20000 UNITS: 20000; 63000; 84000 CAPSULE, DELAYED RELEASE ORAL at 12:59

## 2024-09-15 RX ADMIN — SODIUM BICARBONATE 1300 MG: 650 TABLET ORAL at 09:00

## 2024-09-15 RX ADMIN — VANCOMYCIN HYDROCHLORIDE 250 MG: 250 CAPSULE ORAL at 12:59

## 2024-09-15 RX ADMIN — PANCRELIPASE LIPASE, PANCRELIPASE PROTEASE, PANCRELIPASE AMYLASE 5000 UNITS: 5000; 17000; 24000 CAPSULE, DELAYED RELEASE ORAL at 12:59

## 2024-09-15 RX ADMIN — VANCOMYCIN HYDROCHLORIDE 250 MG: 250 CAPSULE ORAL at 17:52

## 2024-09-15 RX ADMIN — GUAIFENESIN 600 MG: 600 TABLET ORAL at 09:01

## 2024-09-15 RX ADMIN — CHOLESTYRAMINE 4 G: 4 POWDER, FOR SUSPENSION ORAL at 09:00

## 2024-09-15 RX ADMIN — FIDAXOMICIN 200 MG: 200 TABLET, FILM COATED ORAL at 21:03

## 2024-09-15 RX ADMIN — DICYCLOMINE HYDROCHLORIDE 20 MG: 10 CAPSULE ORAL at 06:43

## 2024-09-15 RX ADMIN — FENTANYL CITRATE 50 MCG: 0.05 INJECTION, SOLUTION INTRAMUSCULAR; INTRAVENOUS at 10:36

## 2024-09-15 RX ADMIN — HYOSCYAMINE SULFATE 0.12 MG: 0.12 TABLET SUBLINGUAL at 16:19

## 2024-09-15 RX ADMIN — SODIUM BICARBONATE 1300 MG: 650 TABLET ORAL at 21:03

## 2024-09-15 RX ADMIN — DICYCLOMINE HYDROCHLORIDE 20 MG: 10 CAPSULE ORAL at 10:27

## 2024-09-15 RX ADMIN — DIPHENHYDRAMINE HCL 25 MG: 25 TABLET ORAL at 17:50

## 2024-09-15 RX ADMIN — INSULIN GLARGINE 18 UNITS: 100 INJECTION, SOLUTION SUBCUTANEOUS at 09:01

## 2024-09-15 RX ADMIN — FENTANYL CITRATE 50 MCG: 0.05 INJECTION, SOLUTION INTRAMUSCULAR; INTRAVENOUS at 08:04

## 2024-09-15 RX ADMIN — WARFARIN SODIUM 5 MG: 5 TABLET ORAL at 17:49

## 2024-09-15 ASSESSMENT — PAIN DESCRIPTION - ORIENTATION
ORIENTATION: ANTERIOR;LEFT;RIGHT
ORIENTATION: ANTERIOR;LEFT;RIGHT;UPPER;LOWER
ORIENTATION: RIGHT;LEFT;ANTERIOR;UPPER;LOWER
ORIENTATION: LEFT
ORIENTATION: RIGHT;LEFT;ANTERIOR;UPPER;LOWER
ORIENTATION: RIGHT;LEFT;ANTERIOR;UPPER;LOWER
ORIENTATION: MID
ORIENTATION: ANTERIOR;RIGHT;LEFT;UPPER;LOWER
ORIENTATION: ANTERIOR;LEFT;RIGHT;UPPER;LOWER
ORIENTATION: ANTERIOR;LEFT;RIGHT;UPPER;LOWER

## 2024-09-15 ASSESSMENT — PAIN DESCRIPTION - ONSET
ONSET: ON-GOING

## 2024-09-15 ASSESSMENT — PAIN SCALES - GENERAL
PAINLEVEL_OUTOF10: 10
PAINLEVEL_OUTOF10: 4
PAINLEVEL_OUTOF10: 4
PAINLEVEL_OUTOF10: 9
PAINLEVEL_OUTOF10: 8
PAINLEVEL_OUTOF10: 6
PAINLEVEL_OUTOF10: 4
PAINLEVEL_OUTOF10: 8
PAINLEVEL_OUTOF10: 7
PAINLEVEL_OUTOF10: 9
PAINLEVEL_OUTOF10: 7
PAINLEVEL_OUTOF10: 4
PAINLEVEL_OUTOF10: 4
PAINLEVEL_OUTOF10: 6
PAINLEVEL_OUTOF10: 4
PAINLEVEL_OUTOF10: 9
PAINLEVEL_OUTOF10: 10
PAINLEVEL_OUTOF10: 8
PAINLEVEL_OUTOF10: 10
PAINLEVEL_OUTOF10: 7
PAINLEVEL_OUTOF10: 5

## 2024-09-15 ASSESSMENT — PAIN DESCRIPTION - DESCRIPTORS
DESCRIPTORS: ACHING;THROBBING
DESCRIPTORS: ACHING;THROBBING;CRAMPING
DESCRIPTORS: ACHING;THROBBING
DESCRIPTORS: ACHING;CRAMPING;THROBBING
DESCRIPTORS: ACHING;THROBBING
DESCRIPTORS: ACHING;THROBBING
DESCRIPTORS: ACHING;THROBBING;CRAMPING
DESCRIPTORS: ACHING
DESCRIPTORS: ACHING;THROBBING
DESCRIPTORS: ACHING;CRAMPING;THROBBING
DESCRIPTORS: ACHING;THROBBING
DESCRIPTORS: DISCOMFORT
DESCRIPTORS: ACHING;THROBBING
DESCRIPTORS: ACHING;CRAMPING;THROBBING
DESCRIPTORS: ACHING;DISCOMFORT;SHOOTING

## 2024-09-15 ASSESSMENT — PAIN DESCRIPTION - PAIN TYPE
TYPE: ACUTE PAIN

## 2024-09-15 ASSESSMENT — PAIN - FUNCTIONAL ASSESSMENT
PAIN_FUNCTIONAL_ASSESSMENT: PREVENTS OR INTERFERES SOME ACTIVE ACTIVITIES AND ADLS

## 2024-09-15 ASSESSMENT — PAIN DESCRIPTION - LOCATION
LOCATION: ABDOMEN
LOCATION: ABDOMEN;LEG
LOCATION: ABDOMEN

## 2024-09-15 ASSESSMENT — PAIN DESCRIPTION - FREQUENCY
FREQUENCY: CONTINUOUS

## 2024-09-15 ASSESSMENT — PAIN SCALES - WONG BAKER
WONGBAKER_NUMERICALRESPONSE: HURTS A LITTLE BIT

## 2024-09-16 ENCOUNTER — APPOINTMENT (OUTPATIENT)
Dept: GENERAL RADIOLOGY | Age: 69
DRG: 854 | End: 2024-09-16
Payer: COMMERCIAL

## 2024-09-16 LAB
ANION GAP SERPL CALCULATED.3IONS-SCNC: 8 MMOL/L (ref 9–17)
BUN SERPL-MCNC: 20 MG/DL (ref 8–23)
BUN/CREAT SERPL: 20 (ref 9–20)
C DIFF GDH + TOXINS A+B STL QL IA.RAPID: NEGATIVE
C PARVUM AG STL QL IA: NEGATIVE
CALCIUM SERPL-MCNC: 8.8 MG/DL (ref 8.6–10.4)
CAMPYLOBACTER DNA SPEC NAA+PROBE: NORMAL
CHLORIDE SERPL-SCNC: 110 MMOL/L (ref 98–107)
CO2 SERPL-SCNC: 22 MMOL/L (ref 20–31)
CREAT SERPL-MCNC: 1 MG/DL (ref 0.7–1.2)
ETEC ELTA+ESTB GENES STL QL NAA+PROBE: NORMAL
G LAMBLIA AG STL QL IA: NEGATIVE
GFR, ESTIMATED: 83 ML/MIN/1.73M2
GLUCOSE BLD-MCNC: 104 MG/DL (ref 75–110)
GLUCOSE BLD-MCNC: 256 MG/DL (ref 75–110)
GLUCOSE BLD-MCNC: 80 MG/DL (ref 75–110)
GLUCOSE SERPL-MCNC: 105 MG/DL (ref 70–99)
INR PPP: 2
MAGNESIUM SERPL-MCNC: 2 MG/DL (ref 1.6–2.6)
P SHIGELLOIDES DNA STL QL NAA+PROBE: NORMAL
POTASSIUM SERPL-SCNC: 4.4 MMOL/L (ref 3.7–5.3)
PROTHROMBIN TIME: 22.7 SEC (ref 11.5–14.2)
SALMONELLA DNA SPEC QL NAA+PROBE: NORMAL
SHIGA TOXIN STX GENE SPEC NAA+PROBE: NORMAL
SHIGELLA DNA SPEC QL NAA+PROBE: NORMAL
SODIUM SERPL-SCNC: 140 MMOL/L (ref 135–144)
SOURCE: NORMAL
SPECIMEN DESCRIPTION: NORMAL
V CHOL+PARA RFBL+TRKH+TNAA STL QL NAA+PR: NORMAL
Y ENTERO RECN STL QL NAA+PROBE: NORMAL

## 2024-09-16 PROCEDURE — 6370000000 HC RX 637 (ALT 250 FOR IP)

## 2024-09-16 PROCEDURE — 94761 N-INVAS EAR/PLS OXIMETRY MLT: CPT

## 2024-09-16 PROCEDURE — 97167 OT EVAL HIGH COMPLEX 60 MIN: CPT

## 2024-09-16 PROCEDURE — 6370000000 HC RX 637 (ALT 250 FOR IP): Performed by: UROLOGY

## 2024-09-16 PROCEDURE — 99232 SBSQ HOSP IP/OBS MODERATE 35: CPT | Performed by: INTERNAL MEDICINE

## 2024-09-16 PROCEDURE — 6370000000 HC RX 637 (ALT 250 FOR IP): Performed by: INTERNAL MEDICINE

## 2024-09-16 PROCEDURE — 2060000000 HC ICU INTERMEDIATE R&B

## 2024-09-16 PROCEDURE — 6360000002 HC RX W HCPCS: Performed by: INTERNAL MEDICINE

## 2024-09-16 PROCEDURE — 80048 BASIC METABOLIC PNL TOTAL CA: CPT

## 2024-09-16 PROCEDURE — 36415 COLL VENOUS BLD VENIPUNCTURE: CPT

## 2024-09-16 PROCEDURE — 85610 PROTHROMBIN TIME: CPT

## 2024-09-16 PROCEDURE — 82947 ASSAY GLUCOSE BLOOD QUANT: CPT

## 2024-09-16 PROCEDURE — 74018 RADEX ABDOMEN 1 VIEW: CPT

## 2024-09-16 PROCEDURE — 97535 SELF CARE MNGMENT TRAINING: CPT

## 2024-09-16 PROCEDURE — 97110 THERAPEUTIC EXERCISES: CPT

## 2024-09-16 PROCEDURE — 83735 ASSAY OF MAGNESIUM: CPT

## 2024-09-16 PROCEDURE — 97530 THERAPEUTIC ACTIVITIES: CPT

## 2024-09-16 PROCEDURE — 97162 PT EVAL MOD COMPLEX 30 MIN: CPT

## 2024-09-16 PROCEDURE — 51798 US URINE CAPACITY MEASURE: CPT

## 2024-09-16 PROCEDURE — 2580000003 HC RX 258

## 2024-09-16 RX ORDER — TAMSULOSIN HYDROCHLORIDE 0.4 MG/1
0.4 CAPSULE ORAL 2 TIMES DAILY
Status: DISCONTINUED | OUTPATIENT
Start: 2024-09-16 | End: 2024-09-16

## 2024-09-16 RX ORDER — WARFARIN SODIUM 2.5 MG/1
2.5 TABLET ORAL
Status: DISPENSED | OUTPATIENT
Start: 2024-09-16 | End: 2024-09-17

## 2024-09-16 RX ORDER — DIPHENHYDRAMINE HYDROCHLORIDE 50 MG/ML
25 INJECTION INTRAMUSCULAR; INTRAVENOUS ONCE
Status: COMPLETED | OUTPATIENT
Start: 2024-09-16 | End: 2024-09-16

## 2024-09-16 RX ORDER — TAMSULOSIN HYDROCHLORIDE 0.4 MG/1
0.4 CAPSULE ORAL 2 TIMES DAILY
Status: DISCONTINUED | OUTPATIENT
Start: 2024-09-16 | End: 2024-09-29 | Stop reason: HOSPADM

## 2024-09-16 RX ADMIN — FENTANYL CITRATE 50 MCG: 0.05 INJECTION, SOLUTION INTRAMUSCULAR; INTRAVENOUS at 14:33

## 2024-09-16 RX ADMIN — FIDAXOMICIN 200 MG: 200 TABLET, FILM COATED ORAL at 10:18

## 2024-09-16 RX ADMIN — FENTANYL CITRATE 50 MCG: 0.05 INJECTION, SOLUTION INTRAMUSCULAR; INTRAVENOUS at 21:38

## 2024-09-16 RX ADMIN — FENTANYL CITRATE 50 MCG: 0.05 INJECTION, SOLUTION INTRAMUSCULAR; INTRAVENOUS at 11:01

## 2024-09-16 RX ADMIN — DIPHENHYDRAMINE HCL 25 MG: 25 TABLET ORAL at 21:38

## 2024-09-16 RX ADMIN — DICYCLOMINE HYDROCHLORIDE 20 MG: 10 CAPSULE ORAL at 08:08

## 2024-09-16 RX ADMIN — DICYCLOMINE HYDROCHLORIDE 20 MG: 10 CAPSULE ORAL at 11:05

## 2024-09-16 RX ADMIN — ONDANSETRON 4 MG: 2 INJECTION, SOLUTION INTRAMUSCULAR; INTRAVENOUS at 11:48

## 2024-09-16 RX ADMIN — INSULIN GLARGINE 18 UNITS: 100 INJECTION, SOLUTION SUBCUTANEOUS at 10:19

## 2024-09-16 RX ADMIN — FIDAXOMICIN 200 MG: 200 TABLET, FILM COATED ORAL at 21:38

## 2024-09-16 RX ADMIN — FENTANYL CITRATE 50 MCG: 0.05 INJECTION, SOLUTION INTRAMUSCULAR; INTRAVENOUS at 23:54

## 2024-09-16 RX ADMIN — DIPHENHYDRAMINE HCL 25 MG: 25 TABLET ORAL at 03:10

## 2024-09-16 RX ADMIN — SODIUM BICARBONATE 1300 MG: 650 TABLET ORAL at 21:38

## 2024-09-16 RX ADMIN — CHOLESTYRAMINE 4 G: 4 POWDER, FOR SUSPENSION ORAL at 10:18

## 2024-09-16 RX ADMIN — SODIUM CHLORIDE, PRESERVATIVE FREE 10 ML: 5 INJECTION INTRAVENOUS at 10:19

## 2024-09-16 RX ADMIN — PANCRELIPASE LIPASE, PANCRELIPASE PROTEASE, PANCRELIPASE AMYLASE 20000 UNITS: 20000; 63000; 84000 CAPSULE, DELAYED RELEASE ORAL at 10:18

## 2024-09-16 RX ADMIN — INSULIN LISPRO 2 UNITS: 100 INJECTION, SOLUTION INTRAVENOUS; SUBCUTANEOUS at 11:50

## 2024-09-16 RX ADMIN — GUAIFENESIN 600 MG: 600 TABLET ORAL at 10:18

## 2024-09-16 RX ADMIN — INSULIN LISPRO 2 UNITS: 100 INJECTION, SOLUTION INTRAVENOUS; SUBCUTANEOUS at 16:55

## 2024-09-16 RX ADMIN — PANCRELIPASE LIPASE, PANCRELIPASE PROTEASE, PANCRELIPASE AMYLASE 5000 UNITS: 5000; 17000; 24000 CAPSULE, DELAYED RELEASE ORAL at 10:18

## 2024-09-16 RX ADMIN — TAMSULOSIN HYDROCHLORIDE 0.4 MG: 0.4 CAPSULE ORAL at 21:38

## 2024-09-16 RX ADMIN — DICYCLOMINE HYDROCHLORIDE 20 MG: 10 CAPSULE ORAL at 21:38

## 2024-09-16 RX ADMIN — TAMSULOSIN HYDROCHLORIDE 0.4 MG: 0.4 CAPSULE ORAL at 14:32

## 2024-09-16 RX ADMIN — FENTANYL CITRATE 50 MCG: 0.05 INJECTION, SOLUTION INTRAMUSCULAR; INTRAVENOUS at 07:41

## 2024-09-16 RX ADMIN — DIPHENHYDRAMINE HYDROCHLORIDE 25 MG: 50 INJECTION INTRAMUSCULAR; INTRAVENOUS at 11:48

## 2024-09-16 RX ADMIN — SODIUM BICARBONATE 1300 MG: 650 TABLET ORAL at 10:18

## 2024-09-16 RX ADMIN — FENTANYL CITRATE 50 MCG: 0.05 INJECTION, SOLUTION INTRAMUSCULAR; INTRAVENOUS at 02:44

## 2024-09-16 RX ADMIN — SODIUM CHLORIDE, PRESERVATIVE FREE 10 ML: 5 INJECTION INTRAVENOUS at 21:39

## 2024-09-16 RX ADMIN — FENTANYL CITRATE 50 MCG: 0.05 INJECTION, SOLUTION INTRAMUSCULAR; INTRAVENOUS at 16:54

## 2024-09-16 RX ADMIN — GUAIFENESIN 600 MG: 600 TABLET ORAL at 21:38

## 2024-09-16 ASSESSMENT — PAIN SCALES - WONG BAKER
WONGBAKER_NUMERICALRESPONSE: HURTS A LITTLE BIT

## 2024-09-16 ASSESSMENT — PAIN DESCRIPTION - DESCRIPTORS
DESCRIPTORS: SHARP
DESCRIPTORS: DISCOMFORT
DESCRIPTORS: ACHING
DESCRIPTORS: STABBING;THROBBING
DESCRIPTORS: CRAMPING;SHARP
DESCRIPTORS: THROBBING;STABBING

## 2024-09-16 ASSESSMENT — PAIN DESCRIPTION - ONSET
ONSET: ON-GOING
ONSET: ON-GOING

## 2024-09-16 ASSESSMENT — PAIN SCALES - GENERAL
PAINLEVEL_OUTOF10: 0
PAINLEVEL_OUTOF10: 10
PAINLEVEL_OUTOF10: 10
PAINLEVEL_OUTOF10: 9
PAINLEVEL_OUTOF10: 4
PAINLEVEL_OUTOF10: 8
PAINLEVEL_OUTOF10: 9
PAINLEVEL_OUTOF10: 5
PAINLEVEL_OUTOF10: 9
PAINLEVEL_OUTOF10: 10
PAINLEVEL_OUTOF10: 5
PAINLEVEL_OUTOF10: 4
PAINLEVEL_OUTOF10: 5
PAINLEVEL_OUTOF10: 7
PAINLEVEL_OUTOF10: 5

## 2024-09-16 ASSESSMENT — PAIN DESCRIPTION - LOCATION
LOCATION: ABDOMEN
LOCATION: LEG
LOCATION: ABDOMEN;BUTTOCKS
LOCATION: ABDOMEN;BUTTOCKS
LOCATION: ABDOMEN
LOCATION: ABDOMEN;BUTTOCKS
LOCATION: ABDOMEN;BUTTOCKS
LOCATION: ABDOMEN

## 2024-09-16 ASSESSMENT — PAIN DESCRIPTION - ORIENTATION
ORIENTATION: MID
ORIENTATION: MID;LOWER
ORIENTATION: MID;LOWER
ORIENTATION: LOWER;MID
ORIENTATION: LEFT;RIGHT
ORIENTATION: MID;LOWER
ORIENTATION: MID

## 2024-09-16 ASSESSMENT — PAIN DESCRIPTION - PAIN TYPE
TYPE: ACUTE PAIN
TYPE: ACUTE PAIN

## 2024-09-16 ASSESSMENT — PAIN DESCRIPTION - FREQUENCY
FREQUENCY: CONTINUOUS
FREQUENCY: CONTINUOUS

## 2024-09-16 NOTE — CARE COORDINATION
Social Work-Spoke with patient regarding dc plans. He states that he has assistance a few hours a day from caregiver, Lorelei. He would like  to speak with his daughter regarding Phone call to patient's daughter, Marisela([phone]). She states that patient was dc from [facility] with [facility]. She states that they only provided care for 2 days. They were working with [facility]([phone]) . Discussed home vs SNF. She is considering rehab at [facility]. Referral sent. Beto

## 2024-09-17 LAB
EST. AVERAGE GLUCOSE BLD GHB EST-MCNC: 177 MG/DL
GLIADIN IGA SER IA-ACNC: 27 U/ML
GLIADIN IGG SER IA-ACNC: 3.2 U/ML
GLUCOSE BLD-MCNC: 133 MG/DL (ref 75–110)
GLUCOSE BLD-MCNC: 135 MG/DL (ref 75–110)
GLUCOSE BLD-MCNC: 145 MG/DL (ref 75–110)
GLUCOSE BLD-MCNC: 242 MG/DL (ref 75–110)
HBA1C MFR BLD: 7.8 % (ref 4–6)
IGA SERPL-MCNC: 903 MG/DL (ref 70–400)
INR PPP: 2
PROTHROMBIN TIME: 22.4 SEC (ref 11.5–14.2)
TTG IGA SER IA-ACNC: 3.7 U/ML

## 2024-09-17 PROCEDURE — 6370000000 HC RX 637 (ALT 250 FOR IP): Performed by: INTERNAL MEDICINE

## 2024-09-17 PROCEDURE — 6370000000 HC RX 637 (ALT 250 FOR IP): Performed by: UROLOGY

## 2024-09-17 PROCEDURE — 94761 N-INVAS EAR/PLS OXIMETRY MLT: CPT

## 2024-09-17 PROCEDURE — 2580000003 HC RX 258

## 2024-09-17 PROCEDURE — 6370000000 HC RX 637 (ALT 250 FOR IP): Performed by: NURSE PRACTITIONER

## 2024-09-17 PROCEDURE — 6370000000 HC RX 637 (ALT 250 FOR IP): Performed by: FAMILY MEDICINE

## 2024-09-17 PROCEDURE — 82947 ASSAY GLUCOSE BLOOD QUANT: CPT

## 2024-09-17 PROCEDURE — 6370000000 HC RX 637 (ALT 250 FOR IP)

## 2024-09-17 PROCEDURE — 2580000003 HC RX 258: Performed by: INTERNAL MEDICINE

## 2024-09-17 PROCEDURE — 6360000002 HC RX W HCPCS: Performed by: FAMILY MEDICINE

## 2024-09-17 PROCEDURE — 36415 COLL VENOUS BLD VENIPUNCTURE: CPT

## 2024-09-17 PROCEDURE — 6360000002 HC RX W HCPCS: Performed by: INTERNAL MEDICINE

## 2024-09-17 PROCEDURE — 85610 PROTHROMBIN TIME: CPT

## 2024-09-17 PROCEDURE — 51798 US URINE CAPACITY MEASURE: CPT

## 2024-09-17 PROCEDURE — 0T9B70Z DRAINAGE OF BLADDER WITH DRAINAGE DEVICE, VIA NATURAL OR ARTIFICIAL OPENING: ICD-10-PCS | Performed by: UROLOGY

## 2024-09-17 PROCEDURE — 83036 HEMOGLOBIN GLYCOSYLATED A1C: CPT

## 2024-09-17 PROCEDURE — 2060000000 HC ICU INTERMEDIATE R&B

## 2024-09-17 PROCEDURE — 99232 SBSQ HOSP IP/OBS MODERATE 35: CPT | Performed by: FAMILY MEDICINE

## 2024-09-17 PROCEDURE — 6360000002 HC RX W HCPCS: Performed by: NURSE PRACTITIONER

## 2024-09-17 RX ORDER — WARFARIN SODIUM 5 MG/1
5 TABLET ORAL
Status: COMPLETED | OUTPATIENT
Start: 2024-09-17 | End: 2024-09-17

## 2024-09-17 RX ORDER — SODIUM CHLORIDE 9 MG/ML
INJECTION, SOLUTION INTRAVENOUS CONTINUOUS
Status: DISCONTINUED | OUTPATIENT
Start: 2024-09-17 | End: 2024-09-21

## 2024-09-17 RX ORDER — LIDOCAINE HYDROCHLORIDE 20 MG/ML
20 INJECTION, SOLUTION INFILTRATION; PERINEURAL ONCE
Status: DISCONTINUED | OUTPATIENT
Start: 2024-09-17 | End: 2024-09-17

## 2024-09-17 RX ORDER — OXYCODONE AND ACETAMINOPHEN 5; 325 MG/1; MG/1
1 TABLET ORAL EVERY 4 HOURS PRN
Status: DISCONTINUED | OUTPATIENT
Start: 2024-09-17 | End: 2024-09-18

## 2024-09-17 RX ORDER — QUETIAPINE FUMARATE 25 MG/1
50 TABLET, FILM COATED ORAL DAILY
Status: DISCONTINUED | OUTPATIENT
Start: 2024-09-17 | End: 2024-09-21

## 2024-09-17 RX ORDER — VITAMIN B COMPLEX
2000 TABLET ORAL DAILY
Status: DISCONTINUED | OUTPATIENT
Start: 2024-09-17 | End: 2024-09-29 | Stop reason: HOSPADM

## 2024-09-17 RX ORDER — PREGABALIN 75 MG/1
75 CAPSULE ORAL 2 TIMES DAILY
Status: DISCONTINUED | OUTPATIENT
Start: 2024-09-17 | End: 2024-09-18

## 2024-09-17 RX ORDER — WARFARIN SODIUM 7.5 MG/1
3.75 TABLET ORAL
Status: DISCONTINUED | OUTPATIENT
Start: 2024-09-17 | End: 2024-09-17

## 2024-09-17 RX ORDER — FENTANYL CITRATE 0.05 MG/ML
25 INJECTION, SOLUTION INTRAMUSCULAR; INTRAVENOUS
Status: DISCONTINUED | OUTPATIENT
Start: 2024-09-17 | End: 2024-09-18

## 2024-09-17 RX ORDER — LIDOCAINE HYDROCHLORIDE 20 MG/ML
JELLY TOPICAL PRN
Status: DISCONTINUED | OUTPATIENT
Start: 2024-09-17 | End: 2024-09-29 | Stop reason: HOSPADM

## 2024-09-17 RX ADMIN — GUAIFENESIN 600 MG: 600 TABLET ORAL at 20:59

## 2024-09-17 RX ADMIN — PANCRELIPASE LIPASE, PANCRELIPASE PROTEASE, PANCRELIPASE AMYLASE 5000 UNITS: 5000; 17000; 24000 CAPSULE, DELAYED RELEASE ORAL at 13:14

## 2024-09-17 RX ADMIN — FENTANYL CITRATE 25 MCG: 0.05 INJECTION, SOLUTION INTRAMUSCULAR; INTRAVENOUS at 15:41

## 2024-09-17 RX ADMIN — INSULIN GLARGINE 18 UNITS: 100 INJECTION, SOLUTION SUBCUTANEOUS at 09:06

## 2024-09-17 RX ADMIN — FENTANYL CITRATE 25 MCG: 0.05 INJECTION, SOLUTION INTRAMUSCULAR; INTRAVENOUS at 13:16

## 2024-09-17 RX ADMIN — FENTANYL CITRATE 50 MCG: 0.05 INJECTION, SOLUTION INTRAMUSCULAR; INTRAVENOUS at 06:23

## 2024-09-17 RX ADMIN — SODIUM CHLORIDE: 9 INJECTION, SOLUTION INTRAVENOUS at 08:59

## 2024-09-17 RX ADMIN — PANCRELIPASE LIPASE, PANCRELIPASE PROTEASE, PANCRELIPASE AMYLASE 5000 UNITS: 5000; 17000; 24000 CAPSULE, DELAYED RELEASE ORAL at 09:05

## 2024-09-17 RX ADMIN — TAMSULOSIN HYDROCHLORIDE 0.4 MG: 0.4 CAPSULE ORAL at 20:59

## 2024-09-17 RX ADMIN — Medication 2000 UNITS: at 09:05

## 2024-09-17 RX ADMIN — FENTANYL CITRATE 50 MCG: 0.05 INJECTION, SOLUTION INTRAMUSCULAR; INTRAVENOUS at 03:47

## 2024-09-17 RX ADMIN — PANCRELIPASE LIPASE, PANCRELIPASE PROTEASE, PANCRELIPASE AMYLASE 5000 UNITS: 5000; 17000; 24000 CAPSULE, DELAYED RELEASE ORAL at 17:36

## 2024-09-17 RX ADMIN — PANCRELIPASE LIPASE, PANCRELIPASE PROTEASE, PANCRELIPASE AMYLASE 20000 UNITS: 20000; 63000; 84000 CAPSULE, DELAYED RELEASE ORAL at 09:05

## 2024-09-17 RX ADMIN — SODIUM CHLORIDE: 9 INJECTION, SOLUTION INTRAVENOUS at 23:18

## 2024-09-17 RX ADMIN — TAMSULOSIN HYDROCHLORIDE 0.4 MG: 0.4 CAPSULE ORAL at 09:05

## 2024-09-17 RX ADMIN — DICYCLOMINE HYDROCHLORIDE 20 MG: 10 CAPSULE ORAL at 20:59

## 2024-09-17 RX ADMIN — FENTANYL CITRATE 25 MCG: 0.05 INJECTION, SOLUTION INTRAMUSCULAR; INTRAVENOUS at 09:22

## 2024-09-17 RX ADMIN — FIDAXOMICIN 200 MG: 200 TABLET, FILM COATED ORAL at 09:05

## 2024-09-17 RX ADMIN — Medication 3 MG: at 00:20

## 2024-09-17 RX ADMIN — CHOLESTYRAMINE 4 G: 4 POWDER, FOR SUSPENSION ORAL at 09:06

## 2024-09-17 RX ADMIN — FIDAXOMICIN 200 MG: 200 TABLET, FILM COATED ORAL at 20:59

## 2024-09-17 RX ADMIN — FENTANYL CITRATE 25 MCG: 0.05 INJECTION, SOLUTION INTRAMUSCULAR; INTRAVENOUS at 23:07

## 2024-09-17 RX ADMIN — SODIUM CHLORIDE, PRESERVATIVE FREE 10 ML: 5 INJECTION INTRAVENOUS at 09:06

## 2024-09-17 RX ADMIN — QUETIAPINE FUMARATE 50 MG: 25 TABLET ORAL at 09:05

## 2024-09-17 RX ADMIN — PREGABALIN 75 MG: 75 CAPSULE ORAL at 13:03

## 2024-09-17 RX ADMIN — PANCRELIPASE LIPASE, PANCRELIPASE PROTEASE, PANCRELIPASE AMYLASE 20000 UNITS: 20000; 63000; 84000 CAPSULE, DELAYED RELEASE ORAL at 17:36

## 2024-09-17 RX ADMIN — ZOLPIDEM TARTRATE 5 MG: 5 TABLET, COATED ORAL at 05:11

## 2024-09-17 RX ADMIN — PREGABALIN 75 MG: 75 CAPSULE ORAL at 20:59

## 2024-09-17 RX ADMIN — SODIUM BICARBONATE 1300 MG: 650 TABLET ORAL at 09:05

## 2024-09-17 RX ADMIN — DICYCLOMINE HYDROCHLORIDE 20 MG: 10 CAPSULE ORAL at 17:36

## 2024-09-17 RX ADMIN — PANCRELIPASE LIPASE, PANCRELIPASE PROTEASE, PANCRELIPASE AMYLASE 20000 UNITS: 20000; 63000; 84000 CAPSULE, DELAYED RELEASE ORAL at 13:14

## 2024-09-17 RX ADMIN — GUAIFENESIN 600 MG: 600 TABLET ORAL at 09:05

## 2024-09-17 RX ADMIN — DICYCLOMINE HYDROCHLORIDE 20 MG: 10 CAPSULE ORAL at 13:03

## 2024-09-17 RX ADMIN — DIPHENHYDRAMINE HYDROCHLORIDE 25 MG: 50 INJECTION INTRAMUSCULAR; INTRAVENOUS at 20:59

## 2024-09-17 RX ADMIN — WARFARIN SODIUM 5 MG: 5 TABLET ORAL at 17:36

## 2024-09-17 RX ADMIN — DICYCLOMINE HYDROCHLORIDE 20 MG: 10 CAPSULE ORAL at 06:22

## 2024-09-17 RX ADMIN — DIPHENHYDRAMINE HCL 25 MG: 25 TABLET ORAL at 09:22

## 2024-09-17 RX ADMIN — SODIUM BICARBONATE 1300 MG: 650 TABLET ORAL at 20:59

## 2024-09-17 RX ADMIN — FENTANYL CITRATE 25 MCG: 0.05 INJECTION, SOLUTION INTRAMUSCULAR; INTRAVENOUS at 19:20

## 2024-09-17 ASSESSMENT — PAIN DESCRIPTION - ORIENTATION
ORIENTATION: LOWER;UPPER
ORIENTATION: LEFT;RIGHT;ANTERIOR
ORIENTATION: LOWER;MID
ORIENTATION: RIGHT;LEFT
ORIENTATION: RIGHT;LEFT
ORIENTATION: MID

## 2024-09-17 ASSESSMENT — PAIN DESCRIPTION - DESCRIPTORS
DESCRIPTORS: STABBING
DESCRIPTORS: THROBBING;PINS AND NEEDLES
DESCRIPTORS: PINS AND NEEDLES
DESCRIPTORS: STABBING;THROBBING
DESCRIPTORS: PINS AND NEEDLES
DESCRIPTORS: PINS AND NEEDLES
DESCRIPTORS: THROBBING;STABBING
DESCRIPTORS: STABBING;THROBBING

## 2024-09-17 ASSESSMENT — PAIN SCALES - GENERAL
PAINLEVEL_OUTOF10: 0
PAINLEVEL_OUTOF10: 6
PAINLEVEL_OUTOF10: 9
PAINLEVEL_OUTOF10: 7
PAINLEVEL_OUTOF10: 10
PAINLEVEL_OUTOF10: 6
PAINLEVEL_OUTOF10: 8
PAINLEVEL_OUTOF10: 6
PAINLEVEL_OUTOF10: 0
PAINLEVEL_OUTOF10: 10
PAINLEVEL_OUTOF10: 5
PAINLEVEL_OUTOF10: 7
PAINLEVEL_OUTOF10: 9
PAINLEVEL_OUTOF10: 9
PAINLEVEL_OUTOF10: 7

## 2024-09-17 ASSESSMENT — PAIN DESCRIPTION - FREQUENCY
FREQUENCY: CONTINUOUS
FREQUENCY: CONTINUOUS

## 2024-09-17 ASSESSMENT — PAIN DESCRIPTION - LOCATION
LOCATION: ABDOMEN
LOCATION: ABDOMEN
LOCATION: ABDOMEN;BUTTOCKS
LOCATION: ABDOMEN

## 2024-09-17 ASSESSMENT — PAIN DESCRIPTION - PAIN TYPE
TYPE: ACUTE PAIN
TYPE: ACUTE PAIN

## 2024-09-17 NOTE — PROCEDURES
PROCEDURE NOTE  Date: 9/17/2024   Name: Nicolas Cardenas  YOB: 1955    Preop diagnosis urinary retention    Procedures  Meatal dilatation  Insertion of Quesada    Indications:    Patient with severe restriction at the meatus preventing catheter insertion  A timeout was performed, proper patient identification procedure identification    Patient was given IV fentanyl prior to the procedure    After prepping and draping we dilated the meatus through size 12 Khmer    Patient could only tolerate size  10 catheter connected to straight drainage    200 mL of concentrated urine drained  Bladder decompressed    Patient was likely dehydrated because of severe diarrhea  Recommend checking with primary service and nephrology IV fluids

## 2024-09-17 NOTE — C[facility] COORDIN[facility]. Will reach out to ID to see if isolation is required at [facility]. Beto

## 2024-09-18 PROBLEM — R30.0 DYSURIA: Status: ACTIVE | Noted: 2024-09-18

## 2024-09-18 LAB
ANION GAP SERPL CALCULATED.3IONS-SCNC: 9 MMOL/L (ref 9–17)
BUN SERPL-MCNC: 25 MG/DL (ref 8–23)
BUN/CREAT SERPL: 25 (ref 9–20)
CALCIUM SERPL-MCNC: 8.4 MG/DL (ref 8.6–10.4)
CALPROTECTIN, FECAL: 20 UG/G
CHLORIDE SERPL-SCNC: 110 MMOL/L (ref 98–107)
CO2 SERPL-SCNC: 21 MMOL/L (ref 20–31)
CREAT SERPL-MCNC: 1 MG/DL (ref 0.7–1.2)
GFR, ESTIMATED: 83 ML/MIN/1.73M2
GLUCOSE BLD-MCNC: 113 MG/DL (ref 75–110)
GLUCOSE BLD-MCNC: 217 MG/DL (ref 75–110)
GLUCOSE BLD-MCNC: 219 MG/DL (ref 75–110)
GLUCOSE BLD-MCNC: 371 MG/DL (ref 75–110)
GLUCOSE SERPL-MCNC: 137 MG/DL (ref 70–99)
INR PPP: 1.5
MICROORGANISM SPEC CULT: NORMAL
MICROORGANISM SPEC CULT: NORMAL
POTASSIUM SERPL-SCNC: 4.7 MMOL/L (ref 3.7–5.3)
PROTHROMBIN TIME: 18.1 SEC (ref 11.5–14.2)
SERVICE CMNT-IMP: NORMAL
SERVICE CMNT-IMP: NORMAL
SODIUM SERPL-SCNC: 140 MMOL/L (ref 135–144)
SPECIMEN DESCRIPTION: NORMAL
SPECIMEN DESCRIPTION: NORMAL

## 2024-09-18 PROCEDURE — 2060000000 HC ICU INTERMEDIATE R&B

## 2024-09-18 PROCEDURE — 85610 PROTHROMBIN TIME: CPT

## 2024-09-18 PROCEDURE — 6360000002 HC RX W HCPCS: Performed by: NURSE PRACTITIONER

## 2024-09-18 PROCEDURE — 82947 ASSAY GLUCOSE BLOOD QUANT: CPT

## 2024-09-18 PROCEDURE — 80048 BASIC METABOLIC PNL TOTAL CA: CPT

## 2024-09-18 PROCEDURE — 36415 COLL VENOUS BLD VENIPUNCTURE: CPT

## 2024-09-18 PROCEDURE — 6370000000 HC RX 637 (ALT 250 FOR IP): Performed by: INTERNAL MEDICINE

## 2024-09-18 PROCEDURE — 6370000000 HC RX 637 (ALT 250 FOR IP): Performed by: FAMILY MEDICINE

## 2024-09-18 PROCEDURE — 6370000000 HC RX 637 (ALT 250 FOR IP)

## 2024-09-18 PROCEDURE — 2580000003 HC RX 258

## 2024-09-18 PROCEDURE — 99233 SBSQ HOSP IP/OBS HIGH 50: CPT | Performed by: FAMILY MEDICINE

## 2024-09-18 PROCEDURE — 2580000003 HC RX 258: Performed by: INTERNAL MEDICINE

## 2024-09-18 PROCEDURE — 6370000000 HC RX 637 (ALT 250 FOR IP): Performed by: UROLOGY

## 2024-09-18 PROCEDURE — 6360000002 HC RX W HCPCS: Performed by: FAMILY MEDICINE

## 2024-09-18 RX ORDER — WARFARIN SODIUM 5 MG/1
5 TABLET ORAL
Status: COMPLETED | OUTPATIENT
Start: 2024-09-18 | End: 2024-09-18

## 2024-09-18 RX ORDER — OXYCODONE AND ACETAMINOPHEN 5; 325 MG/1; MG/1
1 TABLET ORAL EVERY 4 HOURS PRN
Status: DISCONTINUED | OUTPATIENT
Start: 2024-09-18 | End: 2024-09-19

## 2024-09-18 RX ORDER — ATROPA BELLADONNA AND OPIUM 16.2; 3 MG/1; MG/1
30 SUPPOSITORY RECTAL EVERY 12 HOURS
Status: DISPENSED | OUTPATIENT
Start: 2024-09-18 | End: 2024-09-20

## 2024-09-18 RX ORDER — OXYCODONE AND ACETAMINOPHEN 5; 325 MG/1; MG/1
2 TABLET ORAL EVERY 4 HOURS PRN
Status: DISCONTINUED | OUTPATIENT
Start: 2024-09-18 | End: 2024-09-19

## 2024-09-18 RX ADMIN — SODIUM CHLORIDE: 9 INJECTION, SOLUTION INTRAVENOUS at 14:23

## 2024-09-18 RX ADMIN — INSULIN LISPRO 1 UNITS: 100 INJECTION, SOLUTION INTRAVENOUS; SUBCUTANEOUS at 17:35

## 2024-09-18 RX ADMIN — FIDAXOMICIN 200 MG: 200 TABLET, FILM COATED ORAL at 19:45

## 2024-09-18 RX ADMIN — INSULIN LISPRO 1 UNITS: 100 INJECTION, SOLUTION INTRAVENOUS; SUBCUTANEOUS at 12:28

## 2024-09-18 RX ADMIN — TAMSULOSIN HYDROCHLORIDE 0.4 MG: 0.4 CAPSULE ORAL at 08:14

## 2024-09-18 RX ADMIN — SODIUM BICARBONATE 1300 MG: 650 TABLET ORAL at 19:45

## 2024-09-18 RX ADMIN — PREGABALIN 75 MG: 75 CAPSULE ORAL at 08:14

## 2024-09-18 RX ADMIN — FENTANYL CITRATE 25 MCG: 0.05 INJECTION, SOLUTION INTRAMUSCULAR; INTRAVENOUS at 08:11

## 2024-09-18 RX ADMIN — DICYCLOMINE HYDROCHLORIDE 20 MG: 10 CAPSULE ORAL at 05:41

## 2024-09-18 RX ADMIN — PANCRELIPASE LIPASE, PANCRELIPASE PROTEASE, PANCRELIPASE AMYLASE 5000 UNITS: 5000; 17000; 24000 CAPSULE, DELAYED RELEASE ORAL at 13:35

## 2024-09-18 RX ADMIN — GUAIFENESIN 600 MG: 600 TABLET ORAL at 19:45

## 2024-09-18 RX ADMIN — SODIUM BICARBONATE 1300 MG: 650 TABLET ORAL at 08:13

## 2024-09-18 RX ADMIN — PANCRELIPASE LIPASE, PANCRELIPASE PROTEASE, PANCRELIPASE AMYLASE 20000 UNITS: 20000; 63000; 84000 CAPSULE, DELAYED RELEASE ORAL at 13:35

## 2024-09-18 RX ADMIN — PANCRELIPASE LIPASE, PANCRELIPASE PROTEASE, PANCRELIPASE AMYLASE 5000 UNITS: 5000; 17000; 24000 CAPSULE, DELAYED RELEASE ORAL at 08:14

## 2024-09-18 RX ADMIN — GUAIFENESIN 600 MG: 600 TABLET ORAL at 08:13

## 2024-09-18 RX ADMIN — CHOLESTYRAMINE 4 G: 4 POWDER, FOR SUSPENSION ORAL at 08:14

## 2024-09-18 RX ADMIN — DIPHENHYDRAMINE HYDROCHLORIDE 25 MG: 50 INJECTION INTRAMUSCULAR; INTRAVENOUS at 19:45

## 2024-09-18 RX ADMIN — QUETIAPINE FUMARATE 50 MG: 25 TABLET ORAL at 08:13

## 2024-09-18 RX ADMIN — SODIUM CHLORIDE, PRESERVATIVE FREE 10 ML: 5 INJECTION INTRAVENOUS at 08:17

## 2024-09-18 RX ADMIN — DICYCLOMINE HYDROCHLORIDE 20 MG: 10 CAPSULE ORAL at 13:35

## 2024-09-18 RX ADMIN — FIDAXOMICIN 200 MG: 200 TABLET, FILM COATED ORAL at 08:13

## 2024-09-18 RX ADMIN — INSULIN GLARGINE 18 UNITS: 100 INJECTION, SOLUTION SUBCUTANEOUS at 08:12

## 2024-09-18 RX ADMIN — PANCRELIPASE LIPASE, PANCRELIPASE PROTEASE, PANCRELIPASE AMYLASE 5000 UNITS: 5000; 17000; 24000 CAPSULE, DELAYED RELEASE ORAL at 17:35

## 2024-09-18 RX ADMIN — OXYCODONE HYDROCHLORIDE AND ACETAMINOPHEN 2 TABLET: 5; 325 TABLET ORAL at 20:53

## 2024-09-18 RX ADMIN — FENTANYL CITRATE 25 MCG: 0.05 INJECTION, SOLUTION INTRAMUSCULAR; INTRAVENOUS at 01:12

## 2024-09-18 RX ADMIN — Medication 2000 UNITS: at 08:13

## 2024-09-18 RX ADMIN — TAMSULOSIN HYDROCHLORIDE 0.4 MG: 0.4 CAPSULE ORAL at 19:45

## 2024-09-18 RX ADMIN — PANCRELIPASE LIPASE, PANCRELIPASE PROTEASE, PANCRELIPASE AMYLASE 20000 UNITS: 20000; 63000; 84000 CAPSULE, DELAYED RELEASE ORAL at 17:35

## 2024-09-18 RX ADMIN — WARFARIN SODIUM 5 MG: 5 TABLET ORAL at 17:35

## 2024-09-18 RX ADMIN — INSULIN LISPRO 4 UNITS: 100 INJECTION, SOLUTION INTRAVENOUS; SUBCUTANEOUS at 20:49

## 2024-09-18 RX ADMIN — PANCRELIPASE LIPASE, PANCRELIPASE PROTEASE, PANCRELIPASE AMYLASE 20000 UNITS: 20000; 63000; 84000 CAPSULE, DELAYED RELEASE ORAL at 08:14

## 2024-09-18 ASSESSMENT — PAIN SCALES - GENERAL
PAINLEVEL_OUTOF10: 10
PAINLEVEL_OUTOF10: 8
PAINLEVEL_OUTOF10: 0
PAINLEVEL_OUTOF10: 6
PAINLEVEL_OUTOF10: 0
PAINLEVEL_OUTOF10: 8

## 2024-09-18 ASSESSMENT — PAIN DESCRIPTION - ORIENTATION
ORIENTATION: MID
ORIENTATION: OTHER (COMMENT)
ORIENTATION: MID

## 2024-09-18 ASSESSMENT — PAIN DESCRIPTION - LOCATION
LOCATION: ABDOMEN

## 2024-09-18 ASSESSMENT — PAIN DESCRIPTION - DESCRIPTORS
DESCRIPTORS: PINS AND NEEDLES
DESCRIPTORS: SHARP
DESCRIPTORS: ACHING;STABBING;THROBBING

## 2024-09-18 NOTE — CARE COORDINATION
Discharge planning    Patient transferred from ICU and chart reviewed. Patient lives home alone. Has B/L BKA. Has caregiver Lorelei who assists with needs. Meals on wheels.     Has referral to [facility] per CM if needed. [facility] approved patient but DO NOT Have isolation beds available. Will need to follow up on how long patient must be in isolation.     Per ID notes c diff infection with history of fecal transplant in past. On oral vanco stopped 9/15 and switched to fidazomicin.     This med may need to be filled here as outpatient and brought to snf due to cost.

## 2024-09-19 LAB
ANION GAP SERPL CALCULATED.3IONS-SCNC: 9 MMOL/L (ref 9–17)
BUN SERPL-MCNC: 27 MG/DL (ref 8–23)
BUN/CREAT SERPL: 27 (ref 9–20)
CALCIUM SERPL-MCNC: 8.3 MG/DL (ref 8.6–10.4)
CHLORIDE SERPL-SCNC: 109 MMOL/L (ref 98–107)
CO2 SERPL-SCNC: 21 MMOL/L (ref 20–31)
CREAT SERPL-MCNC: 1 MG/DL (ref 0.7–1.2)
FECAL PANCREATIC ELASTASE-1: 105 UG/G
GFR, ESTIMATED: 83 ML/MIN/1.73M2
GLUCOSE BLD-MCNC: 145 MG/DL (ref 75–110)
GLUCOSE BLD-MCNC: 148 MG/DL (ref 75–110)
GLUCOSE BLD-MCNC: 95 MG/DL (ref 75–110)
GLUCOSE SERPL-MCNC: 104 MG/DL (ref 70–99)
INR PPP: 1.5
POTASSIUM SERPL-SCNC: 4.9 MMOL/L (ref 3.7–5.3)
PROTHROMBIN TIME: 18.1 SEC (ref 11.5–14.2)
SODIUM SERPL-SCNC: 139 MMOL/L (ref 135–144)

## 2024-09-19 PROCEDURE — 97530 THERAPEUTIC ACTIVITIES: CPT

## 2024-09-19 PROCEDURE — 6370000000 HC RX 637 (ALT 250 FOR IP): Performed by: FAMILY MEDICINE

## 2024-09-19 PROCEDURE — 2060000000 HC ICU INTERMEDIATE R&B

## 2024-09-19 PROCEDURE — 6370000000 HC RX 637 (ALT 250 FOR IP): Performed by: INTERNAL MEDICINE

## 2024-09-19 PROCEDURE — 6370000000 HC RX 637 (ALT 250 FOR IP): Performed by: UROLOGY

## 2024-09-19 PROCEDURE — 6370000000 HC RX 637 (ALT 250 FOR IP): Performed by: NURSE PRACTITIONER

## 2024-09-19 PROCEDURE — 82947 ASSAY GLUCOSE BLOOD QUANT: CPT

## 2024-09-19 PROCEDURE — 36415 COLL VENOUS BLD VENIPUNCTURE: CPT

## 2024-09-19 PROCEDURE — 85610 PROTHROMBIN TIME: CPT

## 2024-09-19 PROCEDURE — 80048 BASIC METABOLIC PNL TOTAL CA: CPT

## 2024-09-19 PROCEDURE — 6370000000 HC RX 637 (ALT 250 FOR IP)

## 2024-09-19 PROCEDURE — 99232 SBSQ HOSP IP/OBS MODERATE 35: CPT | Performed by: FAMILY MEDICINE

## 2024-09-19 PROCEDURE — 97110 THERAPEUTIC EXERCISES: CPT

## 2024-09-19 PROCEDURE — 2580000003 HC RX 258: Performed by: INTERNAL MEDICINE

## 2024-09-19 RX ORDER — WARFARIN SODIUM 5 MG/1
5 TABLET ORAL
Status: COMPLETED | OUTPATIENT
Start: 2024-09-19 | End: 2024-09-19

## 2024-09-19 RX ORDER — OXYCODONE AND ACETAMINOPHEN 5; 325 MG/1; MG/1
1 TABLET ORAL EVERY 4 HOURS PRN
Status: DISCONTINUED | OUTPATIENT
Start: 2024-09-19 | End: 2024-09-29 | Stop reason: HOSPADM

## 2024-09-19 RX ADMIN — PANCRELIPASE LIPASE, PANCRELIPASE PROTEASE, PANCRELIPASE AMYLASE 5000 UNITS: 5000; 17000; 24000 CAPSULE, DELAYED RELEASE ORAL at 17:27

## 2024-09-19 RX ADMIN — GUAIFENESIN 600 MG: 600 TABLET ORAL at 09:08

## 2024-09-19 RX ADMIN — PANCRELIPASE LIPASE, PANCRELIPASE PROTEASE, PANCRELIPASE AMYLASE 20000 UNITS: 20000; 63000; 84000 CAPSULE, DELAYED RELEASE ORAL at 09:07

## 2024-09-19 RX ADMIN — OXYCODONE HYDROCHLORIDE AND ACETAMINOPHEN 2 TABLET: 5; 325 TABLET ORAL at 07:07

## 2024-09-19 RX ADMIN — TAMSULOSIN HYDROCHLORIDE 0.4 MG: 0.4 CAPSULE ORAL at 21:30

## 2024-09-19 RX ADMIN — DIPHENHYDRAMINE HCL 25 MG: 25 TABLET ORAL at 01:49

## 2024-09-19 RX ADMIN — Medication 2000 UNITS: at 09:08

## 2024-09-19 RX ADMIN — PANCRELIPASE LIPASE, PANCRELIPASE PROTEASE, PANCRELIPASE AMYLASE 20000 UNITS: 20000; 63000; 84000 CAPSULE, DELAYED RELEASE ORAL at 17:27

## 2024-09-19 RX ADMIN — WARFARIN SODIUM 5 MG: 5 TABLET ORAL at 17:27

## 2024-09-19 RX ADMIN — QUETIAPINE FUMARATE 50 MG: 25 TABLET ORAL at 09:08

## 2024-09-19 RX ADMIN — CHOLESTYRAMINE 4 G: 4 POWDER, FOR SUSPENSION ORAL at 09:08

## 2024-09-19 RX ADMIN — SODIUM BICARBONATE 1300 MG: 650 TABLET ORAL at 21:30

## 2024-09-19 RX ADMIN — SODIUM BICARBONATE 1300 MG: 650 TABLET ORAL at 09:08

## 2024-09-19 RX ADMIN — OXYCODONE HYDROCHLORIDE AND ACETAMINOPHEN 1 TABLET: 5; 325 TABLET ORAL at 21:30

## 2024-09-19 RX ADMIN — SODIUM CHLORIDE: 9 INJECTION, SOLUTION INTRAVENOUS at 15:35

## 2024-09-19 RX ADMIN — FIDAXOMICIN 200 MG: 200 TABLET, FILM COATED ORAL at 09:07

## 2024-09-19 RX ADMIN — PANCRELIPASE LIPASE, PANCRELIPASE PROTEASE, PANCRELIPASE AMYLASE 5000 UNITS: 5000; 17000; 24000 CAPSULE, DELAYED RELEASE ORAL at 11:58

## 2024-09-19 RX ADMIN — PANCRELIPASE LIPASE, PANCRELIPASE PROTEASE, PANCRELIPASE AMYLASE 5000 UNITS: 5000; 17000; 24000 CAPSULE, DELAYED RELEASE ORAL at 09:07

## 2024-09-19 RX ADMIN — GUAIFENESIN 600 MG: 600 TABLET ORAL at 21:30

## 2024-09-19 RX ADMIN — INSULIN LISPRO 4 UNITS: 100 INJECTION, SOLUTION INTRAVENOUS; SUBCUTANEOUS at 21:32

## 2024-09-19 RX ADMIN — OXYCODONE HYDROCHLORIDE AND ACETAMINOPHEN 2 TABLET: 5; 325 TABLET ORAL at 00:51

## 2024-09-19 RX ADMIN — ZOLPIDEM TARTRATE 5 MG: 5 TABLET, COATED ORAL at 22:26

## 2024-09-19 RX ADMIN — FIDAXOMICIN 200 MG: 200 TABLET, FILM COATED ORAL at 21:30

## 2024-09-19 RX ADMIN — TAMSULOSIN HYDROCHLORIDE 0.4 MG: 0.4 CAPSULE ORAL at 09:08

## 2024-09-19 RX ADMIN — PANCRELIPASE LIPASE, PANCRELIPASE PROTEASE, PANCRELIPASE AMYLASE 20000 UNITS: 20000; 63000; 84000 CAPSULE, DELAYED RELEASE ORAL at 11:58

## 2024-09-19 RX ADMIN — INSULIN GLARGINE 18 UNITS: 100 INJECTION, SOLUTION SUBCUTANEOUS at 09:08

## 2024-09-19 ASSESSMENT — PAIN SCALES - GENERAL
PAINLEVEL_OUTOF10: 6
PAINLEVEL_OUTOF10: 10
PAINLEVEL_OUTOF10: 6
PAINLEVEL_OUTOF10: 8
PAINLEVEL_OUTOF10: 0
PAINLEVEL_OUTOF10: 10

## 2024-09-19 ASSESSMENT — PAIN DESCRIPTION - FREQUENCY
FREQUENCY: CONTINUOUS
FREQUENCY: CONTINUOUS

## 2024-09-19 ASSESSMENT — PAIN DESCRIPTION - PAIN TYPE
TYPE: ACUTE PAIN
TYPE: ACUTE PAIN

## 2024-09-19 ASSESSMENT — PAIN DESCRIPTION - DESCRIPTORS
DESCRIPTORS: PINS AND NEEDLES
DESCRIPTORS: CRAMPING
DESCRIPTORS: CRAMPING
DESCRIPTORS: PINS AND NEEDLES

## 2024-09-19 ASSESSMENT — PAIN DESCRIPTION - ORIENTATION
ORIENTATION: MID

## 2024-09-19 ASSESSMENT — PAIN DESCRIPTION - ONSET
ONSET: ON-GOING
ONSET: ON-GOING

## 2024-09-19 ASSESSMENT — PAIN - FUNCTIONAL ASSESSMENT
PAIN_FUNCTIONAL_ASSESSMENT: ACTIVITIES ARE NOT PREVENTED

## 2024-09-19 ASSESSMENT — PAIN DESCRIPTION - LOCATION
LOCATION: ABDOMEN

## 2024-09-19 NOTE — CARE COORDINATION
Transitional Plan  Jeniffer appiah [facility] called, can accept patient.  Awaiting [facility] and [facility] decision to present to patient for freedom of choice.

## 2024-09-19 NOTE — CARE COORDINATION
Discharge planning    Call back from Jeniffer from [facility] and they need to withdraw their acceptance. They can not service patient. [facility] has declined.     Referrals out to   [facility]  [facility] - declined.   [facility]

## 2024-09-19 NOTE — CARE COORDINATION
Social Work-Spoke with [facility]. The bathrooms are shared so they can not accept patient until he is out of isolation. Left message for daughter for other choices. None of the [place], [facility], or [facility] allow patient's to bring in meds from home. Beto

## 2024-09-19 NOTE — CARE COORDINATION
Discharge planning    Multiple conversations yesterday with attending, ID and infection control. Since patient was + for C DIFF on 9/13 and still actively treating will need to stay in isolation due to diarrhea and treatment.     [facility] but cannot do [facility].     Will need to have another facility or go home with home care thru [facility]. Will need to verify that [facility] has accepted.  Left VM for ss.    Patient is on DIFICID will need to run for cost and if needs PA. Very expensive.  Updated attending will need to verify cost. Once have cost will need to see if patient and family willing to pay for it as daughter did not think oral vanco worked.     1145  Call to [facility] at [facility] to see if they have accepted patient and she will call back once speaks to intake. Call back and they are unable to accept patient insurance.     Sent to [facility] to see if can accept insurance. Patient may still be going to snf.

## 2024-09-20 LAB
ANION GAP SERPL CALCULATED.3IONS-SCNC: 7 MMOL/L (ref 9–17)
BUN SERPL-MCNC: 27 MG/DL (ref 8–23)
BUN/CREAT SERPL: 25 (ref 9–20)
CALCIUM SERPL-MCNC: 8.4 MG/DL (ref 8.6–10.4)
CHLORIDE SERPL-SCNC: 110 MMOL/L (ref 98–107)
CO2 SERPL-SCNC: 24 MMOL/L (ref 20–31)
CREAT SERPL-MCNC: 1.1 MG/DL (ref 0.7–1.2)
GFR, ESTIMATED: 74 ML/MIN/1.73M2
GLUCOSE BLD-MCNC: 180 MG/DL (ref 75–110)
GLUCOSE BLD-MCNC: 185 MG/DL (ref 75–110)
GLUCOSE BLD-MCNC: 221 MG/DL (ref 75–110)
GLUCOSE BLD-MCNC: 79 MG/DL (ref 75–110)
GLUCOSE SERPL-MCNC: 80 MG/DL (ref 70–99)
INR PPP: 1.7
POTASSIUM SERPL-SCNC: 5 MMOL/L (ref 3.7–5.3)
PROTHROMBIN TIME: 19.9 SEC (ref 11.5–14.2)
SODIUM SERPL-SCNC: 141 MMOL/L (ref 135–144)

## 2024-09-20 PROCEDURE — 6370000000 HC RX 637 (ALT 250 FOR IP): Performed by: NURSE PRACTITIONER

## 2024-09-20 PROCEDURE — 99232 SBSQ HOSP IP/OBS MODERATE 35: CPT | Performed by: INTERNAL MEDICINE

## 2024-09-20 PROCEDURE — 97530 THERAPEUTIC ACTIVITIES: CPT

## 2024-09-20 PROCEDURE — 87324 CLOSTRIDIUM AG IA: CPT

## 2024-09-20 PROCEDURE — 6370000000 HC RX 637 (ALT 250 FOR IP): Performed by: INTERNAL MEDICINE

## 2024-09-20 PROCEDURE — 6370000000 HC RX 637 (ALT 250 FOR IP)

## 2024-09-20 PROCEDURE — 6370000000 HC RX 637 (ALT 250 FOR IP): Performed by: UROLOGY

## 2024-09-20 PROCEDURE — 2060000000 HC ICU INTERMEDIATE R&B

## 2024-09-20 PROCEDURE — 80048 BASIC METABOLIC PNL TOTAL CA: CPT

## 2024-09-20 PROCEDURE — 6370000000 HC RX 637 (ALT 250 FOR IP): Performed by: FAMILY MEDICINE

## 2024-09-20 PROCEDURE — 36415 COLL VENOUS BLD VENIPUNCTURE: CPT

## 2024-09-20 PROCEDURE — 82947 ASSAY GLUCOSE BLOOD QUANT: CPT

## 2024-09-20 PROCEDURE — 87449 NOS EACH ORGANISM AG IA: CPT

## 2024-09-20 PROCEDURE — 97164 PT RE-EVAL EST PLAN CARE: CPT

## 2024-09-20 PROCEDURE — 85610 PROTHROMBIN TIME: CPT

## 2024-09-20 RX ORDER — WARFARIN SODIUM 7.5 MG/1
7.5 TABLET ORAL
Status: COMPLETED | OUTPATIENT
Start: 2024-09-20 | End: 2024-09-20

## 2024-09-20 RX ORDER — OXYBUTYNIN CHLORIDE 5 MG/1
5 TABLET, EXTENDED RELEASE ORAL NIGHTLY
Status: DISCONTINUED | OUTPATIENT
Start: 2024-09-20 | End: 2024-09-21

## 2024-09-20 RX ORDER — VANCOMYCIN HYDROCHLORIDE 125 MG/1
CAPSULE ORAL
Qty: 40 CAPSULE | Refills: 0 | Status: SHIPPED | OUTPATIENT
Start: 2024-09-26 | End: 2024-11-15

## 2024-09-20 RX ADMIN — PANCRELIPASE LIPASE, PANCRELIPASE PROTEASE, PANCRELIPASE AMYLASE 5000 UNITS: 5000; 17000; 24000 CAPSULE, DELAYED RELEASE ORAL at 18:32

## 2024-09-20 RX ADMIN — GUAIFENESIN 600 MG: 600 TABLET ORAL at 08:24

## 2024-09-20 RX ADMIN — OXYCODONE HYDROCHLORIDE AND ACETAMINOPHEN 1 TABLET: 5; 325 TABLET ORAL at 14:29

## 2024-09-20 RX ADMIN — OXYCODONE HYDROCHLORIDE AND ACETAMINOPHEN 1 TABLET: 5; 325 TABLET ORAL at 02:30

## 2024-09-20 RX ADMIN — TAMSULOSIN HYDROCHLORIDE 0.4 MG: 0.4 CAPSULE ORAL at 19:45

## 2024-09-20 RX ADMIN — CHOLESTYRAMINE 4 G: 4 POWDER, FOR SUSPENSION ORAL at 14:29

## 2024-09-20 RX ADMIN — PANCRELIPASE LIPASE, PANCRELIPASE PROTEASE, PANCRELIPASE AMYLASE 5000 UNITS: 5000; 17000; 24000 CAPSULE, DELAYED RELEASE ORAL at 14:30

## 2024-09-20 RX ADMIN — PANCRELIPASE LIPASE, PANCRELIPASE PROTEASE, PANCRELIPASE AMYLASE 5000 UNITS: 5000; 17000; 24000 CAPSULE, DELAYED RELEASE ORAL at 08:24

## 2024-09-20 RX ADMIN — PANCRELIPASE LIPASE, PANCRELIPASE PROTEASE, PANCRELIPASE AMYLASE 20000 UNITS: 20000; 63000; 84000 CAPSULE, DELAYED RELEASE ORAL at 14:29

## 2024-09-20 RX ADMIN — ZOLPIDEM TARTRATE 5 MG: 5 TABLET, COATED ORAL at 21:44

## 2024-09-20 RX ADMIN — PANCRELIPASE LIPASE, PANCRELIPASE PROTEASE, PANCRELIPASE AMYLASE 20000 UNITS: 20000; 63000; 84000 CAPSULE, DELAYED RELEASE ORAL at 18:32

## 2024-09-20 RX ADMIN — WARFARIN SODIUM 7.5 MG: 7.5 TABLET ORAL at 18:32

## 2024-09-20 RX ADMIN — PANCRELIPASE LIPASE, PANCRELIPASE PROTEASE, PANCRELIPASE AMYLASE 20000 UNITS: 20000; 63000; 84000 CAPSULE, DELAYED RELEASE ORAL at 08:24

## 2024-09-20 RX ADMIN — SODIUM BICARBONATE 1300 MG: 650 TABLET ORAL at 08:24

## 2024-09-20 RX ADMIN — GUAIFENESIN 600 MG: 600 TABLET ORAL at 19:46

## 2024-09-20 RX ADMIN — OXYCODONE HYDROCHLORIDE AND ACETAMINOPHEN 1 TABLET: 5; 325 TABLET ORAL at 19:46

## 2024-09-20 RX ADMIN — Medication 2000 UNITS: at 08:24

## 2024-09-20 RX ADMIN — OXYCODONE HYDROCHLORIDE AND ACETAMINOPHEN 1 TABLET: 5; 325 TABLET ORAL at 07:26

## 2024-09-20 RX ADMIN — SODIUM BICARBONATE 1300 MG: 650 TABLET ORAL at 19:46

## 2024-09-20 RX ADMIN — FIDAXOMICIN 200 MG: 200 TABLET, FILM COATED ORAL at 08:24

## 2024-09-20 RX ADMIN — DIPHENHYDRAMINE HCL 25 MG: 25 TABLET ORAL at 21:44

## 2024-09-20 RX ADMIN — TAMSULOSIN HYDROCHLORIDE 0.4 MG: 0.4 CAPSULE ORAL at 08:24

## 2024-09-20 RX ADMIN — FIDAXOMICIN 200 MG: 200 TABLET, FILM COATED ORAL at 20:30

## 2024-09-20 RX ADMIN — QUETIAPINE FUMARATE 50 MG: 25 TABLET ORAL at 08:24

## 2024-09-20 RX ADMIN — OXYBUTYNIN CHLORIDE 5 MG: 5 TABLET, EXTENDED RELEASE ORAL at 19:48

## 2024-09-20 ASSESSMENT — PAIN DESCRIPTION - DESCRIPTORS
DESCRIPTORS: ACHING
DESCRIPTORS: CRAMPING
DESCRIPTORS: ACHING;DISCOMFORT;JABBING;PENETRATING
DESCRIPTORS: ACHING
DESCRIPTORS: ACHING;DISCOMFORT;JABBING;PENETRATING

## 2024-09-20 ASSESSMENT — PAIN DESCRIPTION - LOCATION
LOCATION: ABDOMEN
LOCATION: GENERALIZED
LOCATION: ABDOMEN
LOCATION: ABDOMEN
LOCATION: GENERALIZED

## 2024-09-20 ASSESSMENT — PAIN SCALES - GENERAL
PAINLEVEL_OUTOF10: 8
PAINLEVEL_OUTOF10: 6
PAINLEVEL_OUTOF10: 8
PAINLEVEL_OUTOF10: 2
PAINLEVEL_OUTOF10: 8
PAINLEVEL_OUTOF10: 8
PAINLEVEL_OUTOF10: 2
PAINLEVEL_OUTOF10: 8
PAINLEVEL_OUTOF10: 5

## 2024-09-20 ASSESSMENT — PAIN DESCRIPTION - ONSET
ONSET: ON-GOING
ONSET: ON-GOING

## 2024-09-20 ASSESSMENT — PAIN DESCRIPTION - FREQUENCY
FREQUENCY: CONTINUOUS
FREQUENCY: CONTINUOUS

## 2024-09-20 ASSESSMENT — PAIN DESCRIPTION - PAIN TYPE
TYPE: ACUTE PAIN
TYPE: ACUTE PAIN

## 2024-09-20 ASSESSMENT — PAIN - FUNCTIONAL ASSESSMENT
PAIN_FUNCTIONAL_ASSESSMENT: ACTIVITIES ARE NOT PREVENTED
PAIN_FUNCTIONAL_ASSESSMENT: PREVENTS OR INTERFERES SOME ACTIVE ACTIVITIES AND ADLS
PAIN_FUNCTIONAL_ASSESSMENT: ACTIVITIES ARE NOT PREVENTED
PAIN_FUNCTIONAL_ASSESSMENT: PREVENTS OR INTERFERES SOME ACTIVE ACTIVITIES AND ADLS

## 2024-09-20 ASSESSMENT — PAIN DESCRIPTION - ORIENTATION
ORIENTATION: MID
ORIENTATION: MID

## 2024-09-20 NOTE — CARE COORDINATION
Social Work-Spoke with daughter. Discussed that [facility] can not accept patient  while he is in isolation for CDiff. New lab test for CDiff was ordered. Carmen

## 2024-09-20 NOTE — CARE COORDINATION
Referral for home care. Daughter Marisela asked that I speak with [facility] or [facility].     Name: Nicolas JOHN Ronald : 1955 (68 yrs)   Address: [address] [place] [address] Sex: Male   City: [place] [address]         Marital Status:    Employer: DISABLED         Yazidi: [employer]   Primary Care Provider: Rosa Pond APRN - *         Primary Phone: [phone]   EMERGENCY CONTACT   Name Home Phone Work Phone Mobile Phone Relationship Lgl Grd   MARISELA FINCH [phone]     Child ARIN Campo [phone]     Friend           GUARANTOR            Guarantor: Nicolas Cardenas     : 1955   Address: [address] [place] [address] Sex: [place] [address]     Relation to Patient: Self       Home Phone: [phone]   Guarantor ID: [id number]       Work Phone:     Guarantor Employer: DISABLED         Status: DISABLED      COVERAGE        PRIMARY INSURANCE   Payor: [employer] HEALTH PLAN Plan: DEVOTED HEALTH PLANS   Payor Address: [facility] [id number] [place] [id number]       Group Number:   Insurance Type: INDEMNITY   Subscriber Name: NICOLAS CARDENAS Subscriber : 1955   Subscriber ID: [id number] Pat. Rel. to Sub: Self

## 2024-09-20 NOTE — CARE COORDINATION
Discharge planning    VM from [facility] at [facility]. Called back at [phone] and directed to . Message left with request to call back.     Call to Britney at  to see if received referral and to follow up if can accept. VM left with request to call writer back.     1115  Spoke with [facility] and they will not accept due to PAST behaviors.     Call to [facility] to follow up and they declined his insurance.    Call back from [facility] and declined due to PAST behavior.     1200  Very lengthy phone conversation with daughter Marisela.  She was very upset that father behavior has been the focus of all the conversations. Explained that his behavior is what is making a discharge plan difficult.     She is requesting that he be re tested for c diff to see if would be negative after the dificid. Aware the medication will not be covered in any snf nor for home as it is very expensive and insurance formulary for [facility] access hospital states it is I tier 5 and no coverage.     Marisela did ask that writer send referral to [facility] and to ask to speak with [facility] or [facility]. Their number is [phone]     Call to [facility] to inquire about fax number to send over referral to them at [phone]  Referral sent via epic. Request call back if can accept

## 2024-09-21 PROBLEM — A49.8 CLOSTRIDIOIDES DIFFICILE INFECTION: Status: ACTIVE | Noted: 2024-09-21

## 2024-09-21 LAB
ANION GAP SERPL CALCULATED.3IONS-SCNC: 9 MMOL/L (ref 9–17)
BASOPHILS # BLD: 0.14 K/UL (ref 0–0.2)
BASOPHILS NFR BLD: 1 % (ref 0–2)
BUN SERPL-MCNC: 30 MG/DL (ref 8–23)
BUN/CREAT SERPL: 27 (ref 9–20)
C DIFF GDH + TOXINS A+B STL QL IA.RAPID: NEGATIVE
CALCIUM SERPL-MCNC: 8.1 MG/DL (ref 8.6–10.4)
CHLORIDE SERPL-SCNC: 106 MMOL/L (ref 98–107)
CO2 SERPL-SCNC: 21 MMOL/L (ref 20–31)
CREAT SERPL-MCNC: 1.1 MG/DL (ref 0.7–1.2)
EOSINOPHIL # BLD: 0.6 K/UL (ref 0–0.44)
EOSINOPHILS RELATIVE PERCENT: 4 % (ref 1–4)
ERYTHROCYTE [DISTWIDTH] IN BLOOD BY AUTOMATED COUNT: 14.4 % (ref 11.8–14.4)
GFR, ESTIMATED: 74 ML/MIN/1.73M2
GLUCOSE BLD-MCNC: 150 MG/DL (ref 75–110)
GLUCOSE BLD-MCNC: 168 MG/DL (ref 75–110)
GLUCOSE BLD-MCNC: 208 MG/DL (ref 75–110)
GLUCOSE BLD-MCNC: 226 MG/DL (ref 75–110)
GLUCOSE SERPL-MCNC: 210 MG/DL (ref 70–99)
HCT VFR BLD AUTO: 30.4 % (ref 40.7–50.3)
HGB BLD-MCNC: 9.2 G/DL (ref 13–17)
IMM GRANULOCYTES # BLD AUTO: 0.08 K/UL (ref 0–0.3)
IMM GRANULOCYTES NFR BLD: 1 %
INR PPP: 1.7
LYMPHOCYTES NFR BLD: 2.63 K/UL (ref 1.1–3.7)
LYMPHOCYTES RELATIVE PERCENT: 17 % (ref 24–43)
MCH RBC QN AUTO: 29.1 PG (ref 25.2–33.5)
MCHC RBC AUTO-ENTMCNC: 30.3 G/DL (ref 28.4–34.8)
MCV RBC AUTO: 96.2 FL (ref 82.6–102.9)
MONOCYTES NFR BLD: 1.03 K/UL (ref 0.1–1.2)
MONOCYTES NFR BLD: 7 % (ref 3–12)
NEUTROPHILS NFR BLD: 70 % (ref 36–65)
NEUTS SEG NFR BLD: 10.77 K/UL (ref 1.5–8.1)
NRBC BLD-RTO: 0 PER 100 WBC
PLATELET # BLD AUTO: 443 K/UL (ref 138–453)
PMV BLD AUTO: 9.4 FL (ref 8.1–13.5)
POTASSIUM SERPL-SCNC: 5.3 MMOL/L (ref 3.7–5.3)
PROTHROMBIN TIME: 20.3 SEC (ref 11.5–14.2)
RBC # BLD AUTO: 3.16 M/UL (ref 4.21–5.77)
SODIUM SERPL-SCNC: 136 MMOL/L (ref 135–144)
SPECIMEN DESCRIPTION: NORMAL
WBC OTHER # BLD: 15.3 K/UL (ref 3.5–11.3)

## 2024-09-21 PROCEDURE — 6370000000 HC RX 637 (ALT 250 FOR IP)

## 2024-09-21 PROCEDURE — 2580000003 HC RX 258

## 2024-09-21 PROCEDURE — 2580000003 HC RX 258: Performed by: INTERNAL MEDICINE

## 2024-09-21 PROCEDURE — 6360000002 HC RX W HCPCS: Performed by: INTERNAL MEDICINE

## 2024-09-21 PROCEDURE — 2060000000 HC ICU INTERMEDIATE R&B

## 2024-09-21 PROCEDURE — 6370000000 HC RX 637 (ALT 250 FOR IP): Performed by: INTERNAL MEDICINE

## 2024-09-21 PROCEDURE — 36415 COLL VENOUS BLD VENIPUNCTURE: CPT

## 2024-09-21 PROCEDURE — 99232 SBSQ HOSP IP/OBS MODERATE 35: CPT | Performed by: FAMILY MEDICINE

## 2024-09-21 PROCEDURE — 85610 PROTHROMBIN TIME: CPT

## 2024-09-21 PROCEDURE — 6370000000 HC RX 637 (ALT 250 FOR IP): Performed by: FAMILY MEDICINE

## 2024-09-21 PROCEDURE — 85025 COMPLETE CBC W/AUTO DIFF WBC: CPT

## 2024-09-21 PROCEDURE — 99232 SBSQ HOSP IP/OBS MODERATE 35: CPT | Performed by: INTERNAL MEDICINE

## 2024-09-21 PROCEDURE — 80048 BASIC METABOLIC PNL TOTAL CA: CPT

## 2024-09-21 PROCEDURE — 6370000000 HC RX 637 (ALT 250 FOR IP): Performed by: UROLOGY

## 2024-09-21 PROCEDURE — 82947 ASSAY GLUCOSE BLOOD QUANT: CPT

## 2024-09-21 RX ORDER — WARFARIN SODIUM 7.5 MG/1
7.5 TABLET ORAL
Status: COMPLETED | OUTPATIENT
Start: 2024-09-21 | End: 2024-09-21

## 2024-09-21 RX ORDER — CHOLESTYRAMINE LIGHT 4 G/5.7G
4 POWDER, FOR SUSPENSION ORAL DAILY
Qty: 60 PACKET | Refills: 3 | Status: SHIPPED | OUTPATIENT
Start: 2024-09-21

## 2024-09-21 RX ORDER — GUAIFENESIN 600 MG/1
600 TABLET, EXTENDED RELEASE ORAL 2 TIMES DAILY
Qty: 30 TABLET | Refills: 0 | Status: SHIPPED | OUTPATIENT
Start: 2024-09-21

## 2024-09-21 RX ORDER — DIPHENHYDRAMINE HCL 25 MG
25 TABLET ORAL EVERY 6 HOURS PRN
Qty: 30 TABLET | Refills: 0 | Status: SHIPPED | OUTPATIENT
Start: 2024-09-21 | End: 2024-09-23 | Stop reason: HOSPADM

## 2024-09-21 RX ORDER — QUETIAPINE FUMARATE 25 MG/1
50 TABLET, FILM COATED ORAL NIGHTLY
Status: DISCONTINUED | OUTPATIENT
Start: 2024-09-22 | End: 2024-09-29 | Stop reason: HOSPADM

## 2024-09-21 RX ORDER — INSULIN GLARGINE 100 [IU]/ML
18 INJECTION, SOLUTION SUBCUTANEOUS DAILY
Qty: 5 ADJUSTABLE DOSE PRE-FILLED PEN SYRINGE | Refills: 3 | Status: SHIPPED | OUTPATIENT
Start: 2024-09-21

## 2024-09-21 RX ORDER — SODIUM BICARBONATE 650 MG/1
1300 TABLET ORAL 2 TIMES DAILY
Qty: 60 TABLET | Refills: 1 | Status: SHIPPED | OUTPATIENT
Start: 2024-09-21

## 2024-09-21 RX ORDER — OXYBUTYNIN CHLORIDE 5 MG/1
5 TABLET, EXTENDED RELEASE ORAL NIGHTLY
Qty: 30 TABLET | Refills: 3 | Status: SHIPPED | OUTPATIENT
Start: 2024-09-21

## 2024-09-21 RX ORDER — OXYCODONE AND ACETAMINOPHEN 5; 325 MG/1; MG/1
1 TABLET ORAL EVERY 4 HOURS PRN
Qty: 15 TABLET | Refills: 0 | Status: SHIPPED | OUTPATIENT
Start: 2024-09-21 | End: 2024-09-24

## 2024-09-21 RX ADMIN — GUAIFENESIN 600 MG: 600 TABLET ORAL at 07:58

## 2024-09-21 RX ADMIN — SODIUM CHLORIDE, PRESERVATIVE FREE 10 ML: 5 INJECTION INTRAVENOUS at 20:22

## 2024-09-21 RX ADMIN — PANCRELIPASE LIPASE, PANCRELIPASE PROTEASE, PANCRELIPASE AMYLASE 5000 UNITS: 5000; 17000; 24000 CAPSULE, DELAYED RELEASE ORAL at 18:04

## 2024-09-21 RX ADMIN — WARFARIN SODIUM 7.5 MG: 7.5 TABLET ORAL at 18:04

## 2024-09-21 RX ADMIN — GUAIFENESIN 600 MG: 600 TABLET ORAL at 20:21

## 2024-09-21 RX ADMIN — SODIUM BICARBONATE 1300 MG: 650 TABLET ORAL at 07:57

## 2024-09-21 RX ADMIN — ONDANSETRON 4 MG: 2 INJECTION, SOLUTION INTRAMUSCULAR; INTRAVENOUS at 20:22

## 2024-09-21 RX ADMIN — OXYCODONE HYDROCHLORIDE AND ACETAMINOPHEN 1 TABLET: 5; 325 TABLET ORAL at 20:21

## 2024-09-21 RX ADMIN — PANCRELIPASE LIPASE, PANCRELIPASE PROTEASE, PANCRELIPASE AMYLASE 5000 UNITS: 5000; 17000; 24000 CAPSULE, DELAYED RELEASE ORAL at 08:07

## 2024-09-21 RX ADMIN — DIPHENHYDRAMINE HCL 25 MG: 25 TABLET ORAL at 20:21

## 2024-09-21 RX ADMIN — CHOLESTYRAMINE 4 G: 4 POWDER, FOR SUSPENSION ORAL at 12:34

## 2024-09-21 RX ADMIN — FIDAXOMICIN 200 MG: 200 TABLET, FILM COATED ORAL at 20:21

## 2024-09-21 RX ADMIN — INSULIN LISPRO 1 UNITS: 100 INJECTION, SOLUTION INTRAVENOUS; SUBCUTANEOUS at 12:38

## 2024-09-21 RX ADMIN — OXYCODONE HYDROCHLORIDE AND ACETAMINOPHEN 1 TABLET: 5; 325 TABLET ORAL at 07:58

## 2024-09-21 RX ADMIN — SODIUM BICARBONATE 1300 MG: 650 TABLET ORAL at 20:21

## 2024-09-21 RX ADMIN — PANCRELIPASE LIPASE, PANCRELIPASE PROTEASE, PANCRELIPASE AMYLASE 20000 UNITS: 20000; 63000; 84000 CAPSULE, DELAYED RELEASE ORAL at 18:04

## 2024-09-21 RX ADMIN — SODIUM CHLORIDE: 9 INJECTION, SOLUTION INTRAVENOUS at 03:53

## 2024-09-21 RX ADMIN — TAMSULOSIN HYDROCHLORIDE 0.4 MG: 0.4 CAPSULE ORAL at 07:58

## 2024-09-21 RX ADMIN — PANCRELIPASE LIPASE, PANCRELIPASE PROTEASE, PANCRELIPASE AMYLASE 20000 UNITS: 20000; 63000; 84000 CAPSULE, DELAYED RELEASE ORAL at 07:57

## 2024-09-21 RX ADMIN — PANCRELIPASE LIPASE, PANCRELIPASE PROTEASE, PANCRELIPASE AMYLASE 20000 UNITS: 20000; 63000; 84000 CAPSULE, DELAYED RELEASE ORAL at 12:34

## 2024-09-21 RX ADMIN — TAMSULOSIN HYDROCHLORIDE 0.4 MG: 0.4 CAPSULE ORAL at 20:21

## 2024-09-21 RX ADMIN — OXYCODONE HYDROCHLORIDE AND ACETAMINOPHEN 1 TABLET: 5; 325 TABLET ORAL at 01:21

## 2024-09-21 RX ADMIN — OXYCODONE HYDROCHLORIDE AND ACETAMINOPHEN 1 TABLET: 5; 325 TABLET ORAL at 14:28

## 2024-09-21 RX ADMIN — QUETIAPINE FUMARATE 50 MG: 25 TABLET ORAL at 07:58

## 2024-09-21 RX ADMIN — Medication 2000 UNITS: at 07:57

## 2024-09-21 RX ADMIN — INSULIN GLARGINE 18 UNITS: 100 INJECTION, SOLUTION SUBCUTANEOUS at 07:58

## 2024-09-21 RX ADMIN — FIDAXOMICIN 200 MG: 200 TABLET, FILM COATED ORAL at 07:58

## 2024-09-21 RX ADMIN — PANCRELIPASE LIPASE, PANCRELIPASE PROTEASE, PANCRELIPASE AMYLASE 5000 UNITS: 5000; 17000; 24000 CAPSULE, DELAYED RELEASE ORAL at 12:34

## 2024-09-21 ASSESSMENT — PAIN SCALES - GENERAL
PAINLEVEL_OUTOF10: 4
PAINLEVEL_OUTOF10: 8
PAINLEVEL_OUTOF10: 9
PAINLEVEL_OUTOF10: 4

## 2024-09-21 ASSESSMENT — PAIN - FUNCTIONAL ASSESSMENT
PAIN_FUNCTIONAL_ASSESSMENT: ACTIVITIES ARE NOT PREVENTED
PAIN_FUNCTIONAL_ASSESSMENT: ACTIVITIES ARE NOT PREVENTED
PAIN_FUNCTIONAL_ASSESSMENT: PREVENTS OR INTERFERES SOME ACTIVE ACTIVITIES AND ADLS
PAIN_FUNCTIONAL_ASSESSMENT: ACTIVITIES ARE NOT PREVENTED

## 2024-09-21 ASSESSMENT — PAIN DESCRIPTION - LOCATION
LOCATION: ABDOMEN

## 2024-09-21 ASSESSMENT — PAIN DESCRIPTION - DESCRIPTORS
DESCRIPTORS: CRAMPING;DISCOMFORT
DESCRIPTORS: THROBBING
DESCRIPTORS: CRAMPING
DESCRIPTORS: THROBBING;ACHING

## 2024-09-21 ASSESSMENT — PAIN DESCRIPTION - PAIN TYPE: TYPE: CHRONIC PAIN

## 2024-09-21 ASSESSMENT — PAIN DESCRIPTION - ORIENTATION: ORIENTATION: MID

## 2024-09-21 NOTE — DISCHARGE INSTR - COC
Continuity of Care Form    Patient Name: Nicolas Cardenas   :  1955  MRN:  [id number]    Admit date:  2024  Discharge date:      Code Status Order: Full Code   Advance Directives:   Advance Care Flowsheet Documentation             Admitting Physician:  Haleigh Sorto MD  PCP: Rosa Pond, APRN - NP    Discharging Nurse:  Amanda RN   Discharging Hospital Unit/Room#: 1019/1019-02  Discharging Unit Phone Number: [phone]    Emergency Contact:   Extended Emergency Contact Information  Primary Emergency Contact: Marisela Ramirez  Home Phone: [phone]  Relation: Child   needed? No  Secondary Emergency Contact: Lorelei Alberto  Home Phone: [phone]  Relation: Friend    Past Surgical History:  Past Surgical History:   Procedure Laterality Date    BLADDER SURGERY Bilateral 2022    CYSTOSCOPY BILATERALRETROGRADE PYELOGRAM  BILATERAL STENT INSERTION performed by Nicolas Gao MD at [facility] OR    COLONOSCOPY  2015    hyperplastic polyp    COLONOSCOPY  2017    ESOPHAGECTOMY      cancer    FOOT DEBRIDEMENT Left 2021    I&D LEFT FOOT WITH REMOVAL OF NONVIABLE BONE AND SOFT TISSUE performed by Rosa Merino DPM at [facility] OR    FOOT DEBRIDEMENT Left 2021    LEFT FOOT DEBRIDEMENT WITH REMOVAL ALL NON VIABLE SOFT TISSUE AND BONE performed by Rosa Merino DPM at [facility] OR    FOOT SURGERY Right 2016    I & D heel    FOOT SURGERY Right 2016    I & D    FRACTURE SURGERY Left 2015    humerus left, left leg    HC CATH POWER PICC SINGLE  2021         IR INS PICC VAD W SQ PORT GREATER THAN 5  2020    IR INS PICC VAD W SQ PORT GREATER THAN 5 2020 [facility]    IR INS PICC VAD W SQ PORT GREATER THAN 5  2021    IR INS PICC VAD W SQ PORT GREATER THAN 5 2021 [facility]    IR INS PICC VAD W SQ PORT GREATER THAN 5  2021    IR INS PICC VAD W SQ PORT GREATER THAN 5 2021 [facility]

## 2024-09-21 NOTE — CARE COORDINATION
DC Planning    Spoke with HUNTER. CDiff negative, will check to see if [facility] can now accept, will be a precert.

## 2024-09-22 LAB
ANION GAP SERPL CALCULATED.3IONS-SCNC: 9 MMOL/L (ref 9–17)
BASOPHILS # BLD: 0.14 K/UL (ref 0–0.2)
BASOPHILS NFR BLD: 1 % (ref 0–2)
BUN SERPL-MCNC: 32 MG/DL (ref 8–23)
BUN/CREAT SERPL: 27 (ref 9–20)
CALCIUM SERPL-MCNC: 8.6 MG/DL (ref 8.6–10.4)
CHLORIDE SERPL-SCNC: 105 MMOL/L (ref 98–107)
CO2 SERPL-SCNC: 22 MMOL/L (ref 20–31)
CREAT SERPL-MCNC: 1.2 MG/DL (ref 0.7–1.2)
EOSINOPHIL # BLD: 0.46 K/UL (ref 0–0.44)
EOSINOPHILS RELATIVE PERCENT: 3 % (ref 1–4)
ERYTHROCYTE [DISTWIDTH] IN BLOOD BY AUTOMATED COUNT: 14.4 % (ref 11.8–14.4)
GFR, ESTIMATED: 67 ML/MIN/1.73M2
GLUCOSE BLD-MCNC: 162 MG/DL (ref 75–110)
GLUCOSE BLD-MCNC: 190 MG/DL (ref 75–110)
GLUCOSE BLD-MCNC: 274 MG/DL (ref 75–110)
GLUCOSE SERPL-MCNC: 192 MG/DL (ref 70–99)
HCT VFR BLD AUTO: 31.6 % (ref 40.7–50.3)
HGB BLD-MCNC: 9.5 G/DL (ref 13–17)
IMM GRANULOCYTES # BLD AUTO: 0.05 K/UL (ref 0–0.3)
IMM GRANULOCYTES NFR BLD: 0 %
INR PPP: 2.2
LYMPHOCYTES NFR BLD: 2.37 K/UL (ref 1.1–3.7)
LYMPHOCYTES RELATIVE PERCENT: 18 % (ref 24–43)
MCH RBC QN AUTO: 29.2 PG (ref 25.2–33.5)
MCHC RBC AUTO-ENTMCNC: 30.1 G/DL (ref 28.4–34.8)
MCV RBC AUTO: 97.2 FL (ref 82.6–102.9)
MONOCYTES NFR BLD: 0.91 K/UL (ref 0.1–1.2)
MONOCYTES NFR BLD: 7 % (ref 3–12)
NEUTROPHILS NFR BLD: 71 % (ref 36–65)
NEUTS SEG NFR BLD: 9.52 K/UL (ref 1.5–8.1)
NRBC BLD-RTO: 0 PER 100 WBC
PLATELET # BLD AUTO: 458 K/UL (ref 138–453)
PMV BLD AUTO: 9.4 FL (ref 8.1–13.5)
POTASSIUM SERPL-SCNC: 5.1 MMOL/L (ref 3.7–5.3)
PROTHROMBIN TIME: 24 SEC (ref 11.5–14.2)
RBC # BLD AUTO: 3.25 M/UL (ref 4.21–5.77)
SODIUM SERPL-SCNC: 136 MMOL/L (ref 135–144)
WBC OTHER # BLD: 13.5 K/UL (ref 3.5–11.3)

## 2024-09-22 PROCEDURE — 2060000000 HC ICU INTERMEDIATE R&B

## 2024-09-22 PROCEDURE — 6370000000 HC RX 637 (ALT 250 FOR IP)

## 2024-09-22 PROCEDURE — 82947 ASSAY GLUCOSE BLOOD QUANT: CPT

## 2024-09-22 PROCEDURE — 6370000000 HC RX 637 (ALT 250 FOR IP): Performed by: NURSE PRACTITIONER

## 2024-09-22 PROCEDURE — 6370000000 HC RX 637 (ALT 250 FOR IP): Performed by: INTERNAL MEDICINE

## 2024-09-22 PROCEDURE — 85025 COMPLETE CBC W/AUTO DIFF WBC: CPT

## 2024-09-22 PROCEDURE — 6370000000 HC RX 637 (ALT 250 FOR IP): Performed by: UROLOGY

## 2024-09-22 PROCEDURE — 80048 BASIC METABOLIC PNL TOTAL CA: CPT

## 2024-09-22 PROCEDURE — 99232 SBSQ HOSP IP/OBS MODERATE 35: CPT | Performed by: FAMILY MEDICINE

## 2024-09-22 PROCEDURE — 85610 PROTHROMBIN TIME: CPT

## 2024-09-22 PROCEDURE — 36415 COLL VENOUS BLD VENIPUNCTURE: CPT

## 2024-09-22 PROCEDURE — 6360000002 HC RX W HCPCS: Performed by: NURSE PRACTITIONER

## 2024-09-22 PROCEDURE — 2580000003 HC RX 258

## 2024-09-22 PROCEDURE — 6370000000 HC RX 637 (ALT 250 FOR IP): Performed by: FAMILY MEDICINE

## 2024-09-22 RX ORDER — WARFARIN SODIUM 2.5 MG/1
2.5 TABLET ORAL
Status: COMPLETED | OUTPATIENT
Start: 2024-09-22 | End: 2024-09-22

## 2024-09-22 RX ADMIN — SODIUM CHLORIDE, PRESERVATIVE FREE 10 ML: 5 INJECTION INTRAVENOUS at 08:14

## 2024-09-22 RX ADMIN — SODIUM BICARBONATE 1300 MG: 650 TABLET ORAL at 08:13

## 2024-09-22 RX ADMIN — WARFARIN SODIUM 2.5 MG: 2.5 TABLET ORAL at 16:58

## 2024-09-22 RX ADMIN — GUAIFENESIN 600 MG: 600 TABLET ORAL at 08:14

## 2024-09-22 RX ADMIN — OXYCODONE HYDROCHLORIDE AND ACETAMINOPHEN 1 TABLET: 5; 325 TABLET ORAL at 20:08

## 2024-09-22 RX ADMIN — GUAIFENESIN 600 MG: 600 TABLET ORAL at 20:08

## 2024-09-22 RX ADMIN — INSULIN GLARGINE 18 UNITS: 100 INJECTION, SOLUTION SUBCUTANEOUS at 08:14

## 2024-09-22 RX ADMIN — PANCRELIPASE LIPASE, PANCRELIPASE PROTEASE, PANCRELIPASE AMYLASE 20000 UNITS: 20000; 63000; 84000 CAPSULE, DELAYED RELEASE ORAL at 14:39

## 2024-09-22 RX ADMIN — Medication 2000 UNITS: at 08:14

## 2024-09-22 RX ADMIN — OXYCODONE HYDROCHLORIDE AND ACETAMINOPHEN 1 TABLET: 5; 325 TABLET ORAL at 06:38

## 2024-09-22 RX ADMIN — TAMSULOSIN HYDROCHLORIDE 0.4 MG: 0.4 CAPSULE ORAL at 20:07

## 2024-09-22 RX ADMIN — DIPHENHYDRAMINE HYDROCHLORIDE 25 MG: 50 INJECTION INTRAMUSCULAR; INTRAVENOUS at 20:08

## 2024-09-22 RX ADMIN — Medication 3 MG: at 01:26

## 2024-09-22 RX ADMIN — OXYCODONE HYDROCHLORIDE AND ACETAMINOPHEN 1 TABLET: 5; 325 TABLET ORAL at 14:43

## 2024-09-22 RX ADMIN — FIDAXOMICIN 200 MG: 200 TABLET, FILM COATED ORAL at 14:42

## 2024-09-22 RX ADMIN — QUETIAPINE FUMARATE 50 MG: 25 TABLET ORAL at 20:08

## 2024-09-22 RX ADMIN — PANCRELIPASE LIPASE, PANCRELIPASE PROTEASE, PANCRELIPASE AMYLASE 20000 UNITS: 20000; 63000; 84000 CAPSULE, DELAYED RELEASE ORAL at 08:13

## 2024-09-22 RX ADMIN — SODIUM BICARBONATE 1300 MG: 650 TABLET ORAL at 20:07

## 2024-09-22 RX ADMIN — PANCRELIPASE LIPASE, PANCRELIPASE PROTEASE, PANCRELIPASE AMYLASE 20000 UNITS: 20000; 63000; 84000 CAPSULE, DELAYED RELEASE ORAL at 16:58

## 2024-09-22 RX ADMIN — CHOLESTYRAMINE 4 G: 4 POWDER, FOR SUSPENSION ORAL at 14:39

## 2024-09-22 RX ADMIN — FIDAXOMICIN 200 MG: 200 TABLET, FILM COATED ORAL at 20:08

## 2024-09-22 RX ADMIN — Medication 3 MG: at 20:07

## 2024-09-22 RX ADMIN — PANCRELIPASE LIPASE, PANCRELIPASE PROTEASE, PANCRELIPASE AMYLASE 5000 UNITS: 5000; 17000; 24000 CAPSULE, DELAYED RELEASE ORAL at 14:39

## 2024-09-22 RX ADMIN — PANCRELIPASE LIPASE, PANCRELIPASE PROTEASE, PANCRELIPASE AMYLASE 5000 UNITS: 5000; 17000; 24000 CAPSULE, DELAYED RELEASE ORAL at 08:13

## 2024-09-22 RX ADMIN — TAMSULOSIN HYDROCHLORIDE 0.4 MG: 0.4 CAPSULE ORAL at 08:14

## 2024-09-22 RX ADMIN — PANCRELIPASE LIPASE, PANCRELIPASE PROTEASE, PANCRELIPASE AMYLASE 5000 UNITS: 5000; 17000; 24000 CAPSULE, DELAYED RELEASE ORAL at 16:58

## 2024-09-22 RX ADMIN — OXYCODONE HYDROCHLORIDE AND ACETAMINOPHEN 1 TABLET: 5; 325 TABLET ORAL at 00:05

## 2024-09-22 RX ADMIN — SODIUM CHLORIDE, PRESERVATIVE FREE 10 ML: 5 INJECTION INTRAVENOUS at 20:09

## 2024-09-22 ASSESSMENT — PAIN SCALES - GENERAL
PAINLEVEL_OUTOF10: 8
PAINLEVEL_OUTOF10: 4
PAINLEVEL_OUTOF10: 8
PAINLEVEL_OUTOF10: 4
PAINLEVEL_OUTOF10: 7
PAINLEVEL_OUTOF10: 8
PAINLEVEL_OUTOF10: 3

## 2024-09-22 ASSESSMENT — PAIN DESCRIPTION - LOCATION
LOCATION: ABDOMEN

## 2024-09-22 ASSESSMENT — PAIN DESCRIPTION - DESCRIPTORS
DESCRIPTORS: CRAMPING
DESCRIPTORS: DISCOMFORT
DESCRIPTORS: CRAMPING

## 2024-09-22 ASSESSMENT — PAIN DESCRIPTION - ORIENTATION
ORIENTATION: MID
ORIENTATION: MID

## 2024-09-23 LAB
ANION GAP SERPL CALCULATED.3IONS-SCNC: 10 MMOL/L (ref 9–17)
BASOPHILS # BLD: 0.14 K/UL (ref 0–0.2)
BASOPHILS NFR BLD: 1 % (ref 0–2)
BUN SERPL-MCNC: 32 MG/DL (ref 8–23)
BUN/CREAT SERPL: 29 (ref 9–20)
CALCIUM SERPL-MCNC: 8.8 MG/DL (ref 8.6–10.4)
CHLORIDE SERPL-SCNC: 109 MMOL/L (ref 98–107)
CO2 SERPL-SCNC: 22 MMOL/L (ref 20–31)
CREAT SERPL-MCNC: 1.1 MG/DL (ref 0.7–1.2)
EOSINOPHIL # BLD: 0.49 K/UL (ref 0–0.44)
EOSINOPHILS RELATIVE PERCENT: 4 % (ref 1–4)
ERYTHROCYTE [DISTWIDTH] IN BLOOD BY AUTOMATED COUNT: 14.6 % (ref 11.8–14.4)
GFR, ESTIMATED: 74 ML/MIN/1.73M2
GLUCOSE BLD-MCNC: 168 MG/DL (ref 75–110)
GLUCOSE BLD-MCNC: 195 MG/DL (ref 75–110)
GLUCOSE BLD-MCNC: 198 MG/DL (ref 75–110)
GLUCOSE BLD-MCNC: 286 MG/DL (ref 75–110)
GLUCOSE BLD-MCNC: 95 MG/DL (ref 75–110)
GLUCOSE BLD-MCNC: 98 MG/DL (ref 75–110)
GLUCOSE SERPL-MCNC: 93 MG/DL (ref 70–99)
HCT VFR BLD AUTO: 30.8 % (ref 40.7–50.3)
HGB BLD-MCNC: 9.3 G/DL (ref 13–17)
IMM GRANULOCYTES # BLD AUTO: 0.04 K/UL (ref 0–0.3)
IMM GRANULOCYTES NFR BLD: 0 %
INR PPP: 2.2
LYMPHOCYTES NFR BLD: 2.71 K/UL (ref 1.1–3.7)
LYMPHOCYTES RELATIVE PERCENT: 21 % (ref 24–43)
MCH RBC QN AUTO: 29.1 PG (ref 25.2–33.5)
MCHC RBC AUTO-ENTMCNC: 30.2 G/DL (ref 28.4–34.8)
MCV RBC AUTO: 96.3 FL (ref 82.6–102.9)
MONOCYTES NFR BLD: 0.95 K/UL (ref 0.1–1.2)
MONOCYTES NFR BLD: 7 % (ref 3–12)
NEUTROPHILS NFR BLD: 67 % (ref 36–65)
NEUTS SEG NFR BLD: 8.69 K/UL (ref 1.5–8.1)
NRBC BLD-RTO: 0 PER 100 WBC
PLATELET # BLD AUTO: 413 K/UL (ref 138–453)
PMV BLD AUTO: 9.3 FL (ref 8.1–13.5)
POTASSIUM SERPL-SCNC: 4.4 MMOL/L (ref 3.7–5.3)
PROTHROMBIN TIME: 24.6 SEC (ref 11.5–14.2)
RBC # BLD AUTO: 3.2 M/UL (ref 4.21–5.77)
RBC # BLD: ABNORMAL 10*6/UL
SODIUM SERPL-SCNC: 141 MMOL/L (ref 135–144)
WBC OTHER # BLD: 13 K/UL (ref 3.5–11.3)

## 2024-09-23 PROCEDURE — 36415 COLL VENOUS BLD VENIPUNCTURE: CPT

## 2024-09-23 PROCEDURE — 6370000000 HC RX 637 (ALT 250 FOR IP): Performed by: FAMILY MEDICINE

## 2024-09-23 PROCEDURE — 99232 SBSQ HOSP IP/OBS MODERATE 35: CPT | Performed by: INTERNAL MEDICINE

## 2024-09-23 PROCEDURE — 6370000000 HC RX 637 (ALT 250 FOR IP): Performed by: INTERNAL MEDICINE

## 2024-09-23 PROCEDURE — 6370000000 HC RX 637 (ALT 250 FOR IP): Performed by: UROLOGY

## 2024-09-23 PROCEDURE — 80048 BASIC METABOLIC PNL TOTAL CA: CPT

## 2024-09-23 PROCEDURE — 2060000000 HC ICU INTERMEDIATE R&B

## 2024-09-23 PROCEDURE — 2580000003 HC RX 258

## 2024-09-23 PROCEDURE — 85025 COMPLETE CBC W/AUTO DIFF WBC: CPT

## 2024-09-23 PROCEDURE — 97530 THERAPEUTIC ACTIVITIES: CPT

## 2024-09-23 PROCEDURE — 82947 ASSAY GLUCOSE BLOOD QUANT: CPT

## 2024-09-23 PROCEDURE — 6370000000 HC RX 637 (ALT 250 FOR IP)

## 2024-09-23 PROCEDURE — 97535 SELF CARE MNGMENT TRAINING: CPT

## 2024-09-23 PROCEDURE — 85610 PROTHROMBIN TIME: CPT

## 2024-09-23 PROCEDURE — 99232 SBSQ HOSP IP/OBS MODERATE 35: CPT | Performed by: FAMILY MEDICINE

## 2024-09-23 PROCEDURE — 6360000002 HC RX W HCPCS: Performed by: NURSE PRACTITIONER

## 2024-09-23 RX ORDER — DIPHENHYDRAMINE HCL 25 MG
25 CAPSULE ORAL NIGHTLY PRN
Qty: 30 CAPSULE | Refills: 0 | Status: SHIPPED | OUTPATIENT
Start: 2024-09-23 | End: 2024-10-23

## 2024-09-23 RX ORDER — WARFARIN SODIUM 5 MG/1
5 TABLET ORAL
Status: COMPLETED | OUTPATIENT
Start: 2024-09-23 | End: 2024-09-23

## 2024-09-23 RX ORDER — LACTOBACILLUS RHAMNOSUS GG 10B CELL
2 CAPSULE ORAL 2 TIMES DAILY WITH MEALS
Qty: 30 CAPSULE | Refills: 0 | Status: SHIPPED | OUTPATIENT
Start: 2024-09-23

## 2024-09-23 RX ORDER — LACTOBACILLUS RHAMNOSUS GG 10B CELL
2 CAPSULE ORAL 2 TIMES DAILY WITH MEALS
Status: DISCONTINUED | OUTPATIENT
Start: 2024-09-23 | End: 2024-09-29 | Stop reason: HOSPADM

## 2024-09-23 RX ADMIN — PANCRELIPASE LIPASE, PANCRELIPASE PROTEASE, PANCRELIPASE AMYLASE 5000 UNITS: 5000; 17000; 24000 CAPSULE, DELAYED RELEASE ORAL at 12:27

## 2024-09-23 RX ADMIN — SODIUM BICARBONATE 1300 MG: 650 TABLET ORAL at 07:47

## 2024-09-23 RX ADMIN — PANCRELIPASE LIPASE, PANCRELIPASE PROTEASE, PANCRELIPASE AMYLASE 5000 UNITS: 5000; 17000; 24000 CAPSULE, DELAYED RELEASE ORAL at 07:47

## 2024-09-23 RX ADMIN — OXYCODONE HYDROCHLORIDE AND ACETAMINOPHEN 1 TABLET: 5; 325 TABLET ORAL at 01:42

## 2024-09-23 RX ADMIN — DIPHENHYDRAMINE HCL 25 MG: 25 TABLET ORAL at 01:42

## 2024-09-23 RX ADMIN — Medication 2 CAPSULE: at 17:39

## 2024-09-23 RX ADMIN — PANCRELIPASE LIPASE, PANCRELIPASE PROTEASE, PANCRELIPASE AMYLASE 20000 UNITS: 20000; 63000; 84000 CAPSULE, DELAYED RELEASE ORAL at 12:27

## 2024-09-23 RX ADMIN — TAMSULOSIN HYDROCHLORIDE 0.4 MG: 0.4 CAPSULE ORAL at 07:46

## 2024-09-23 RX ADMIN — HYOSCYAMINE SULFATE 0.12 MG: 0.12 TABLET SUBLINGUAL at 20:41

## 2024-09-23 RX ADMIN — Medication 2 CAPSULE: at 12:26

## 2024-09-23 RX ADMIN — OXYCODONE HYDROCHLORIDE AND ACETAMINOPHEN 1 TABLET: 5; 325 TABLET ORAL at 20:34

## 2024-09-23 RX ADMIN — PANCRELIPASE LIPASE, PANCRELIPASE PROTEASE, PANCRELIPASE AMYLASE 20000 UNITS: 20000; 63000; 84000 CAPSULE, DELAYED RELEASE ORAL at 17:39

## 2024-09-23 RX ADMIN — GUAIFENESIN 600 MG: 600 TABLET ORAL at 20:34

## 2024-09-23 RX ADMIN — QUETIAPINE FUMARATE 50 MG: 25 TABLET ORAL at 20:34

## 2024-09-23 RX ADMIN — SODIUM BICARBONATE 1300 MG: 650 TABLET ORAL at 20:34

## 2024-09-23 RX ADMIN — OXYCODONE HYDROCHLORIDE AND ACETAMINOPHEN 1 TABLET: 5; 325 TABLET ORAL at 06:03

## 2024-09-23 RX ADMIN — FIDAXOMICIN 200 MG: 200 TABLET, FILM COATED ORAL at 07:46

## 2024-09-23 RX ADMIN — WARFARIN SODIUM 5 MG: 5 TABLET ORAL at 17:39

## 2024-09-23 RX ADMIN — GUAIFENESIN 600 MG: 600 TABLET ORAL at 07:46

## 2024-09-23 RX ADMIN — SODIUM CHLORIDE, PRESERVATIVE FREE 10 ML: 5 INJECTION INTRAVENOUS at 20:42

## 2024-09-23 RX ADMIN — OXYCODONE HYDROCHLORIDE AND ACETAMINOPHEN 1 TABLET: 5; 325 TABLET ORAL at 15:05

## 2024-09-23 RX ADMIN — Medication 2000 UNITS: at 07:46

## 2024-09-23 RX ADMIN — TAMSULOSIN HYDROCHLORIDE 0.4 MG: 0.4 CAPSULE ORAL at 20:34

## 2024-09-23 RX ADMIN — CHOLESTYRAMINE 4 G: 4 POWDER, FOR SUSPENSION ORAL at 12:27

## 2024-09-23 RX ADMIN — FIDAXOMICIN 200 MG: 200 TABLET, FILM COATED ORAL at 20:34

## 2024-09-23 RX ADMIN — PANCRELIPASE LIPASE, PANCRELIPASE PROTEASE, PANCRELIPASE AMYLASE 5000 UNITS: 5000; 17000; 24000 CAPSULE, DELAYED RELEASE ORAL at 17:39

## 2024-09-23 RX ADMIN — DIPHENHYDRAMINE HYDROCHLORIDE 25 MG: 50 INJECTION INTRAMUSCULAR; INTRAVENOUS at 20:41

## 2024-09-23 RX ADMIN — INSULIN LISPRO 2 UNITS: 100 INJECTION, SOLUTION INTRAVENOUS; SUBCUTANEOUS at 12:26

## 2024-09-23 RX ADMIN — SODIUM CHLORIDE, PRESERVATIVE FREE 10 ML: 5 INJECTION INTRAVENOUS at 07:47

## 2024-09-23 RX ADMIN — PANCRELIPASE LIPASE, PANCRELIPASE PROTEASE, PANCRELIPASE AMYLASE 20000 UNITS: 20000; 63000; 84000 CAPSULE, DELAYED RELEASE ORAL at 07:47

## 2024-09-23 RX ADMIN — DIPHENHYDRAMINE HCL 25 MG: 25 TABLET ORAL at 07:47

## 2024-09-23 RX ADMIN — INSULIN GLARGINE 18 UNITS: 100 INJECTION, SOLUTION SUBCUTANEOUS at 09:59

## 2024-09-23 RX ADMIN — DIPHENHYDRAMINE HCL 25 MG: 25 TABLET ORAL at 16:04

## 2024-09-23 ASSESSMENT — PAIN DESCRIPTION - LOCATION
LOCATION: ABDOMEN

## 2024-09-23 ASSESSMENT — PAIN DESCRIPTION - FREQUENCY
FREQUENCY: CONTINUOUS

## 2024-09-23 ASSESSMENT — PAIN - FUNCTIONAL ASSESSMENT
PAIN_FUNCTIONAL_ASSESSMENT: ACTIVITIES ARE NOT PREVENTED

## 2024-09-23 ASSESSMENT — PAIN SCALES - GENERAL
PAINLEVEL_OUTOF10: 6
PAINLEVEL_OUTOF10: 8
PAINLEVEL_OUTOF10: 6
PAINLEVEL_OUTOF10: 8

## 2024-09-23 ASSESSMENT — PAIN DESCRIPTION - DESCRIPTORS
DESCRIPTORS: DISCOMFORT
DESCRIPTORS: STABBING;CRAMPING
DESCRIPTORS: STABBING;CRAMPING

## 2024-09-23 ASSESSMENT — PAIN DESCRIPTION - PAIN TYPE
TYPE: CHRONIC PAIN
TYPE: ACUTE PAIN
TYPE: CHRONIC PAIN
TYPE: ACUTE PAIN

## 2024-09-23 ASSESSMENT — PAIN DESCRIPTION - ONSET
ONSET: ON-GOING

## 2024-09-23 ASSESSMENT — PAIN DESCRIPTION - ORIENTATION
ORIENTATION: ANTERIOR
ORIENTATION: MID
ORIENTATION: ANTERIOR
ORIENTATION: MID

## 2024-09-23 NOTE — CARE COORDINATION
Discharge planning    Patient repeat C DIFF negative. Still on dificid thru 9/25. Social work to ask [facility] to start precert since c diff negative.     Will need to finalize medications at time of discharge. [facility] unable to take dificid so will need to complete here.

## 2024-09-24 LAB
ANION GAP SERPL CALCULATED.3IONS-SCNC: 9 MMOL/L (ref 9–17)
BASOPHILS # BLD: 0.13 K/UL (ref 0–0.2)
BASOPHILS NFR BLD: 1 % (ref 0–2)
BUN SERPL-MCNC: 31 MG/DL (ref 8–23)
BUN/CREAT SERPL: 24 (ref 9–20)
CALCIUM SERPL-MCNC: 8.5 MG/DL (ref 8.6–10.4)
CHLORIDE SERPL-SCNC: 106 MMOL/L (ref 98–107)
CO2 SERPL-SCNC: 23 MMOL/L (ref 20–31)
CREAT SERPL-MCNC: 1.3 MG/DL (ref 0.7–1.2)
EOSINOPHIL # BLD: 0.53 K/UL (ref 0–0.44)
EOSINOPHILS RELATIVE PERCENT: 4 % (ref 1–4)
ERYTHROCYTE [DISTWIDTH] IN BLOOD BY AUTOMATED COUNT: 14.4 % (ref 11.8–14.4)
GFR, ESTIMATED: 61 ML/MIN/1.73M2
GLUCOSE BLD-MCNC: 123 MG/DL (ref 75–110)
GLUCOSE BLD-MCNC: 236 MG/DL (ref 75–110)
GLUCOSE BLD-MCNC: 246 MG/DL (ref 75–110)
GLUCOSE BLD-MCNC: 266 MG/DL (ref 75–110)
GLUCOSE SERPL-MCNC: 106 MG/DL (ref 70–99)
HCT VFR BLD AUTO: 30.7 % (ref 40.7–50.3)
HGB BLD-MCNC: 9.4 G/DL (ref 13–17)
IMM GRANULOCYTES # BLD AUTO: 0.06 K/UL (ref 0–0.3)
IMM GRANULOCYTES NFR BLD: 0 %
INR PPP: 2.3
LYMPHOCYTES NFR BLD: 2.47 K/UL (ref 1.1–3.7)
LYMPHOCYTES RELATIVE PERCENT: 18 % (ref 24–43)
MCH RBC QN AUTO: 28.7 PG (ref 25.2–33.5)
MCHC RBC AUTO-ENTMCNC: 30.6 G/DL (ref 28.4–34.8)
MCV RBC AUTO: 93.6 FL (ref 82.6–102.9)
MONOCYTES NFR BLD: 1.1 K/UL (ref 0.1–1.2)
MONOCYTES NFR BLD: 8 % (ref 3–12)
NEUTROPHILS NFR BLD: 69 % (ref 36–65)
NEUTS SEG NFR BLD: 9.65 K/UL (ref 1.5–8.1)
NRBC BLD-RTO: 0 PER 100 WBC
PLATELET # BLD AUTO: 440 K/UL (ref 138–453)
PMV BLD AUTO: 9.4 FL (ref 8.1–13.5)
POTASSIUM SERPL-SCNC: 4.4 MMOL/L (ref 3.7–5.3)
PROTHROMBIN TIME: 25 SEC (ref 11.5–14.2)
RBC # BLD AUTO: 3.28 M/UL (ref 4.21–5.77)
SODIUM SERPL-SCNC: 138 MMOL/L (ref 135–144)
WBC OTHER # BLD: 13.9 K/UL (ref 3.5–11.3)

## 2024-09-24 PROCEDURE — 6370000000 HC RX 637 (ALT 250 FOR IP): Performed by: UROLOGY

## 2024-09-24 PROCEDURE — 6370000000 HC RX 637 (ALT 250 FOR IP)

## 2024-09-24 PROCEDURE — 6370000000 HC RX 637 (ALT 250 FOR IP): Performed by: INTERNAL MEDICINE

## 2024-09-24 PROCEDURE — 99232 SBSQ HOSP IP/OBS MODERATE 35: CPT | Performed by: STUDENT IN AN ORGANIZED HEALTH CARE EDUCATION/TRAINING PROGRAM

## 2024-09-24 PROCEDURE — 2580000003 HC RX 258: Performed by: INTERNAL MEDICINE

## 2024-09-24 PROCEDURE — 2580000003 HC RX 258

## 2024-09-24 PROCEDURE — 99232 SBSQ HOSP IP/OBS MODERATE 35: CPT | Performed by: INTERNAL MEDICINE

## 2024-09-24 PROCEDURE — 82947 ASSAY GLUCOSE BLOOD QUANT: CPT

## 2024-09-24 PROCEDURE — 2060000000 HC ICU INTERMEDIATE R&B

## 2024-09-24 PROCEDURE — 36415 COLL VENOUS BLD VENIPUNCTURE: CPT

## 2024-09-24 PROCEDURE — 6370000000 HC RX 637 (ALT 250 FOR IP): Performed by: FAMILY MEDICINE

## 2024-09-24 PROCEDURE — 85610 PROTHROMBIN TIME: CPT

## 2024-09-24 PROCEDURE — 6360000002 HC RX W HCPCS: Performed by: NURSE PRACTITIONER

## 2024-09-24 PROCEDURE — 6370000000 HC RX 637 (ALT 250 FOR IP): Performed by: STUDENT IN AN ORGANIZED HEALTH CARE EDUCATION/TRAINING PROGRAM

## 2024-09-24 PROCEDURE — 80048 BASIC METABOLIC PNL TOTAL CA: CPT

## 2024-09-24 PROCEDURE — 85025 COMPLETE CBC W/AUTO DIFF WBC: CPT

## 2024-09-24 RX ORDER — SODIUM CHLORIDE 9 MG/ML
INJECTION, SOLUTION INTRAVENOUS CONTINUOUS
Status: DISCONTINUED | OUTPATIENT
Start: 2024-09-24 | End: 2024-09-29

## 2024-09-24 RX ORDER — BISACODYL 5 MG/1
10 TABLET, DELAYED RELEASE ORAL ONCE
Status: DISCONTINUED | OUTPATIENT
Start: 2024-09-24 | End: 2024-09-24

## 2024-09-24 RX ORDER — WARFARIN SODIUM 2.5 MG/1
2.5 TABLET ORAL
Status: COMPLETED | OUTPATIENT
Start: 2024-09-24 | End: 2024-09-24

## 2024-09-24 RX ADMIN — TAMSULOSIN HYDROCHLORIDE 0.4 MG: 0.4 CAPSULE ORAL at 09:44

## 2024-09-24 RX ADMIN — PANCRELIPASE LIPASE, PANCRELIPASE PROTEASE, PANCRELIPASE AMYLASE 20000 UNITS: 20000; 63000; 84000 CAPSULE, DELAYED RELEASE ORAL at 09:44

## 2024-09-24 RX ADMIN — SODIUM CHLORIDE: 9 INJECTION, SOLUTION INTRAVENOUS at 11:09

## 2024-09-24 RX ADMIN — SODIUM CHLORIDE, PRESERVATIVE FREE 10 ML: 5 INJECTION INTRAVENOUS at 09:44

## 2024-09-24 RX ADMIN — GUAIFENESIN 600 MG: 600 TABLET ORAL at 20:55

## 2024-09-24 RX ADMIN — OXYCODONE HYDROCHLORIDE AND ACETAMINOPHEN 1 TABLET: 5; 325 TABLET ORAL at 01:01

## 2024-09-24 RX ADMIN — PANCRELIPASE LIPASE, PANCRELIPASE PROTEASE, PANCRELIPASE AMYLASE 20000 UNITS: 20000; 63000; 84000 CAPSULE, DELAYED RELEASE ORAL at 13:02

## 2024-09-24 RX ADMIN — SODIUM BICARBONATE 1300 MG: 650 TABLET ORAL at 09:43

## 2024-09-24 RX ADMIN — INSULIN LISPRO 1 UNITS: 100 INJECTION, SOLUTION INTRAVENOUS; SUBCUTANEOUS at 13:02

## 2024-09-24 RX ADMIN — HYOSCYAMINE SULFATE 0.12 MG: 0.12 TABLET SUBLINGUAL at 10:17

## 2024-09-24 RX ADMIN — SODIUM BICARBONATE 1300 MG: 650 TABLET ORAL at 20:56

## 2024-09-24 RX ADMIN — OXYCODONE HYDROCHLORIDE AND ACETAMINOPHEN 1 TABLET: 5; 325 TABLET ORAL at 17:27

## 2024-09-24 RX ADMIN — GUAIFENESIN 600 MG: 600 TABLET ORAL at 09:43

## 2024-09-24 RX ADMIN — TAMSULOSIN HYDROCHLORIDE 0.4 MG: 0.4 CAPSULE ORAL at 20:55

## 2024-09-24 RX ADMIN — FIDAXOMICIN 200 MG: 200 TABLET, FILM COATED ORAL at 20:55

## 2024-09-24 RX ADMIN — DIPHENHYDRAMINE HCL 25 MG: 25 TABLET ORAL at 02:53

## 2024-09-24 RX ADMIN — Medication 2 CAPSULE: at 09:43

## 2024-09-24 RX ADMIN — HYOSCYAMINE SULFATE 0.12 MG: 0.12 TABLET SUBLINGUAL at 04:23

## 2024-09-24 RX ADMIN — PANCRELIPASE LIPASE, PANCRELIPASE PROTEASE, PANCRELIPASE AMYLASE 20000 UNITS: 20000; 63000; 84000 CAPSULE, DELAYED RELEASE ORAL at 17:27

## 2024-09-24 RX ADMIN — CHOLESTYRAMINE 4 G: 4 POWDER, FOR SUSPENSION ORAL at 13:03

## 2024-09-24 RX ADMIN — PANCRELIPASE LIPASE, PANCRELIPASE PROTEASE, PANCRELIPASE AMYLASE 5000 UNITS: 5000; 17000; 24000 CAPSULE, DELAYED RELEASE ORAL at 09:44

## 2024-09-24 RX ADMIN — INSULIN GLARGINE 18 UNITS: 100 INJECTION, SOLUTION SUBCUTANEOUS at 09:43

## 2024-09-24 RX ADMIN — FIDAXOMICIN 200 MG: 200 TABLET, FILM COATED ORAL at 09:44

## 2024-09-24 RX ADMIN — WARFARIN SODIUM 2.5 MG: 2.5 TABLET ORAL at 17:27

## 2024-09-24 RX ADMIN — INSULIN LISPRO 2 UNITS: 100 INJECTION, SOLUTION INTRAVENOUS; SUBCUTANEOUS at 17:27

## 2024-09-24 RX ADMIN — OXYCODONE HYDROCHLORIDE AND ACETAMINOPHEN 1 TABLET: 5; 325 TABLET ORAL at 05:52

## 2024-09-24 RX ADMIN — HYOSCYAMINE SULFATE 0.12 MG: 0.12 TABLET SUBLINGUAL at 20:56

## 2024-09-24 RX ADMIN — OXYCODONE HYDROCHLORIDE AND ACETAMINOPHEN 1 TABLET: 5; 325 TABLET ORAL at 13:10

## 2024-09-24 RX ADMIN — PANCRELIPASE LIPASE, PANCRELIPASE PROTEASE, PANCRELIPASE AMYLASE 5000 UNITS: 5000; 17000; 24000 CAPSULE, DELAYED RELEASE ORAL at 13:02

## 2024-09-24 RX ADMIN — DIPHENHYDRAMINE HYDROCHLORIDE 25 MG: 50 INJECTION INTRAMUSCULAR; INTRAVENOUS at 20:57

## 2024-09-24 RX ADMIN — QUETIAPINE FUMARATE 50 MG: 25 TABLET ORAL at 20:55

## 2024-09-24 RX ADMIN — DIPHENHYDRAMINE HCL 25 MG: 25 TABLET ORAL at 10:05

## 2024-09-24 RX ADMIN — Medication 2 CAPSULE: at 17:27

## 2024-09-24 RX ADMIN — PANCRELIPASE LIPASE, PANCRELIPASE PROTEASE, PANCRELIPASE AMYLASE 5000 UNITS: 5000; 17000; 24000 CAPSULE, DELAYED RELEASE ORAL at 17:27

## 2024-09-24 RX ADMIN — DIPHENHYDRAMINE HCL 25 MG: 25 TABLET ORAL at 15:56

## 2024-09-24 RX ADMIN — Medication 2000 UNITS: at 09:43

## 2024-09-24 ASSESSMENT — PAIN DESCRIPTION - PAIN TYPE
TYPE: ACUTE PAIN
TYPE: CHRONIC PAIN
TYPE: ACUTE PAIN
TYPE: CHRONIC PAIN
TYPE: ACUTE PAIN
TYPE: CHRONIC PAIN
TYPE: CHRONIC PAIN

## 2024-09-24 ASSESSMENT — PAIN SCALES - GENERAL
PAINLEVEL_OUTOF10: 6
PAINLEVEL_OUTOF10: 10
PAINLEVEL_OUTOF10: 8
PAINLEVEL_OUTOF10: 4
PAINLEVEL_OUTOF10: 6
PAINLEVEL_OUTOF10: 8
PAINLEVEL_OUTOF10: 6
PAINLEVEL_OUTOF10: 8
PAINLEVEL_OUTOF10: 6
PAINLEVEL_OUTOF10: 7
PAINLEVEL_OUTOF10: 8
PAINLEVEL_OUTOF10: 6

## 2024-09-24 ASSESSMENT — PAIN DESCRIPTION - DESCRIPTORS
DESCRIPTORS: CRAMPING;STABBING

## 2024-09-24 ASSESSMENT — PAIN DESCRIPTION - ONSET
ONSET: ON-GOING

## 2024-09-24 ASSESSMENT — PAIN DESCRIPTION - ORIENTATION
ORIENTATION: ANTERIOR
ORIENTATION: ANTERIOR
ORIENTATION: MID;LOWER;RIGHT
ORIENTATION: ANTERIOR
ORIENTATION: ANTERIOR
ORIENTATION: MID;LOWER
ORIENTATION: ANTERIOR
ORIENTATION: RIGHT;MID;LOWER
ORIENTATION: RIGHT;MID;LOWER
ORIENTATION: ANTERIOR
ORIENTATION: ANTERIOR

## 2024-09-24 ASSESSMENT — PAIN DESCRIPTION - FREQUENCY
FREQUENCY: CONTINUOUS

## 2024-09-24 ASSESSMENT — PAIN - FUNCTIONAL ASSESSMENT
PAIN_FUNCTIONAL_ASSESSMENT: ACTIVITIES ARE NOT PREVENTED

## 2024-09-24 ASSESSMENT — PAIN DESCRIPTION - LOCATION
LOCATION: ABDOMEN
LOCATION: LEG
LOCATION: ABDOMEN

## 2024-09-24 NOTE — CARE COORDINATION
Social  Work-Spoke with [facility]. Discussed that patient tested negative for CDiff, but he is still having loose stools. They are making room changes to accommodate hime.

## 2024-09-25 PROBLEM — R19.7 DIARRHEA: Status: ACTIVE | Noted: 2024-09-25

## 2024-09-25 LAB
ANION GAP SERPL CALCULATED.3IONS-SCNC: 11 MMOL/L (ref 9–17)
BASOPHILS # BLD: 0.1 K/UL (ref 0–0.2)
BASOPHILS NFR BLD: 1 % (ref 0–2)
BUN SERPL-MCNC: 35 MG/DL (ref 8–23)
BUN/CREAT SERPL: 29 (ref 9–20)
CALCIUM SERPL-MCNC: 8.4 MG/DL (ref 8.6–10.4)
CHLORIDE SERPL-SCNC: 108 MMOL/L (ref 98–107)
CO2 SERPL-SCNC: 21 MMOL/L (ref 20–31)
CREAT SERPL-MCNC: 1.2 MG/DL (ref 0.7–1.2)
EOSINOPHIL # BLD: 0.58 K/UL (ref 0–0.44)
EOSINOPHILS RELATIVE PERCENT: 5 % (ref 1–4)
ERYTHROCYTE [DISTWIDTH] IN BLOOD BY AUTOMATED COUNT: 14.5 % (ref 11.8–14.4)
GFR, ESTIMATED: 67 ML/MIN/1.73M2
GLUCOSE BLD-MCNC: 103 MG/DL (ref 75–110)
GLUCOSE BLD-MCNC: 231 MG/DL (ref 75–110)
GLUCOSE BLD-MCNC: 268 MG/DL (ref 75–110)
GLUCOSE SERPL-MCNC: 134 MG/DL (ref 70–99)
HCT VFR BLD AUTO: 28.7 % (ref 40.7–50.3)
HGB BLD-MCNC: 8.8 G/DL (ref 13–17)
IMM GRANULOCYTES # BLD AUTO: 0.05 K/UL (ref 0–0.3)
IMM GRANULOCYTES NFR BLD: 0 %
INR PPP: 2.2
LYMPHOCYTES NFR BLD: 2.07 K/UL (ref 1.1–3.7)
LYMPHOCYTES RELATIVE PERCENT: 16 % (ref 24–43)
MCH RBC QN AUTO: 28.9 PG (ref 25.2–33.5)
MCHC RBC AUTO-ENTMCNC: 30.7 G/DL (ref 28.4–34.8)
MCV RBC AUTO: 94.4 FL (ref 82.6–102.9)
MONOCYTES NFR BLD: 1.16 K/UL (ref 0.1–1.2)
MONOCYTES NFR BLD: 9 % (ref 3–12)
NEUTROPHILS NFR BLD: 69 % (ref 36–65)
NEUTS SEG NFR BLD: 8.71 K/UL (ref 1.5–8.1)
NRBC BLD-RTO: 0 PER 100 WBC
PLATELET # BLD AUTO: 392 K/UL (ref 138–453)
PMV BLD AUTO: 9.4 FL (ref 8.1–13.5)
POTASSIUM SERPL-SCNC: 4.2 MMOL/L (ref 3.7–5.3)
PROTHROMBIN TIME: 24.1 SEC (ref 11.5–14.2)
RBC # BLD AUTO: 3.04 M/UL (ref 4.21–5.77)
RBC # BLD: ABNORMAL 10*6/UL
SODIUM SERPL-SCNC: 140 MMOL/L (ref 135–144)
WBC OTHER # BLD: 12.7 K/UL (ref 3.5–11.3)

## 2024-09-25 PROCEDURE — 97535 SELF CARE MNGMENT TRAINING: CPT

## 2024-09-25 PROCEDURE — 85610 PROTHROMBIN TIME: CPT

## 2024-09-25 PROCEDURE — 6370000000 HC RX 637 (ALT 250 FOR IP): Performed by: INTERNAL MEDICINE

## 2024-09-25 PROCEDURE — 2060000000 HC ICU INTERMEDIATE R&B

## 2024-09-25 PROCEDURE — 99232 SBSQ HOSP IP/OBS MODERATE 35: CPT | Performed by: STUDENT IN AN ORGANIZED HEALTH CARE EDUCATION/TRAINING PROGRAM

## 2024-09-25 PROCEDURE — 97530 THERAPEUTIC ACTIVITIES: CPT

## 2024-09-25 PROCEDURE — 2580000003 HC RX 258: Performed by: INTERNAL MEDICINE

## 2024-09-25 PROCEDURE — 6370000000 HC RX 637 (ALT 250 FOR IP): Performed by: FAMILY MEDICINE

## 2024-09-25 PROCEDURE — 6370000000 HC RX 637 (ALT 250 FOR IP): Performed by: UROLOGY

## 2024-09-25 PROCEDURE — 80048 BASIC METABOLIC PNL TOTAL CA: CPT

## 2024-09-25 PROCEDURE — 6370000000 HC RX 637 (ALT 250 FOR IP)

## 2024-09-25 PROCEDURE — 6360000002 HC RX W HCPCS: Performed by: NURSE PRACTITIONER

## 2024-09-25 PROCEDURE — 82947 ASSAY GLUCOSE BLOOD QUANT: CPT

## 2024-09-25 PROCEDURE — 85025 COMPLETE CBC W/AUTO DIFF WBC: CPT

## 2024-09-25 PROCEDURE — 36415 COLL VENOUS BLD VENIPUNCTURE: CPT

## 2024-09-25 RX ORDER — WARFARIN SODIUM 5 MG/1
5 TABLET ORAL
Status: COMPLETED | OUTPATIENT
Start: 2024-09-25 | End: 2024-09-25

## 2024-09-25 RX ADMIN — SODIUM BICARBONATE 1300 MG: 650 TABLET ORAL at 08:53

## 2024-09-25 RX ADMIN — OXYCODONE HYDROCHLORIDE AND ACETAMINOPHEN 1 TABLET: 5; 325 TABLET ORAL at 00:50

## 2024-09-25 RX ADMIN — DIPHENHYDRAMINE HCL 25 MG: 25 TABLET ORAL at 17:24

## 2024-09-25 RX ADMIN — HYOSCYAMINE SULFATE 0.12 MG: 0.12 TABLET SUBLINGUAL at 00:51

## 2024-09-25 RX ADMIN — OXYCODONE HYDROCHLORIDE AND ACETAMINOPHEN 1 TABLET: 5; 325 TABLET ORAL at 22:12

## 2024-09-25 RX ADMIN — GUAIFENESIN 600 MG: 600 TABLET ORAL at 20:09

## 2024-09-25 RX ADMIN — Medication 2000 UNITS: at 08:53

## 2024-09-25 RX ADMIN — INSULIN LISPRO 3 UNITS: 100 INJECTION, SOLUTION INTRAVENOUS; SUBCUTANEOUS at 17:24

## 2024-09-25 RX ADMIN — CHOLESTYRAMINE 4 G: 4 POWDER, FOR SUSPENSION ORAL at 12:15

## 2024-09-25 RX ADMIN — WARFARIN SODIUM 5 MG: 5 TABLET ORAL at 17:23

## 2024-09-25 RX ADMIN — Medication 2 CAPSULE: at 08:53

## 2024-09-25 RX ADMIN — PANCRELIPASE LIPASE, PANCRELIPASE PROTEASE, PANCRELIPASE AMYLASE 20000 UNITS: 20000; 63000; 84000 CAPSULE, DELAYED RELEASE ORAL at 08:53

## 2024-09-25 RX ADMIN — PANCRELIPASE LIPASE, PANCRELIPASE PROTEASE, PANCRELIPASE AMYLASE 20000 UNITS: 20000; 63000; 84000 CAPSULE, DELAYED RELEASE ORAL at 12:15

## 2024-09-25 RX ADMIN — DIPHENHYDRAMINE HYDROCHLORIDE 25 MG: 50 INJECTION INTRAMUSCULAR; INTRAVENOUS at 23:08

## 2024-09-25 RX ADMIN — SODIUM CHLORIDE: 9 INJECTION, SOLUTION INTRAVENOUS at 08:53

## 2024-09-25 RX ADMIN — PANCRELIPASE LIPASE, PANCRELIPASE PROTEASE, PANCRELIPASE AMYLASE 20000 UNITS: 20000; 63000; 84000 CAPSULE, DELAYED RELEASE ORAL at 17:24

## 2024-09-25 RX ADMIN — INSULIN GLARGINE 18 UNITS: 100 INJECTION, SOLUTION SUBCUTANEOUS at 08:53

## 2024-09-25 RX ADMIN — INSULIN LISPRO 2 UNITS: 100 INJECTION, SOLUTION INTRAVENOUS; SUBCUTANEOUS at 12:16

## 2024-09-25 RX ADMIN — PANCRELIPASE LIPASE, PANCRELIPASE PROTEASE, PANCRELIPASE AMYLASE 5000 UNITS: 5000; 17000; 24000 CAPSULE, DELAYED RELEASE ORAL at 17:24

## 2024-09-25 RX ADMIN — PANCRELIPASE LIPASE, PANCRELIPASE PROTEASE, PANCRELIPASE AMYLASE 5000 UNITS: 5000; 17000; 24000 CAPSULE, DELAYED RELEASE ORAL at 12:27

## 2024-09-25 RX ADMIN — DIPHENHYDRAMINE HCL 25 MG: 25 TABLET ORAL at 00:50

## 2024-09-25 RX ADMIN — GUAIFENESIN 600 MG: 600 TABLET ORAL at 08:53

## 2024-09-25 RX ADMIN — TAMSULOSIN HYDROCHLORIDE 0.4 MG: 0.4 CAPSULE ORAL at 20:09

## 2024-09-25 RX ADMIN — FIDAXOMICIN 200 MG: 200 TABLET, FILM COATED ORAL at 08:53

## 2024-09-25 RX ADMIN — Medication 2 CAPSULE: at 17:24

## 2024-09-25 RX ADMIN — OXYCODONE HYDROCHLORIDE AND ACETAMINOPHEN 1 TABLET: 5; 325 TABLET ORAL at 17:23

## 2024-09-25 RX ADMIN — QUETIAPINE FUMARATE 50 MG: 25 TABLET ORAL at 20:09

## 2024-09-25 RX ADMIN — SODIUM BICARBONATE 1300 MG: 650 TABLET ORAL at 20:09

## 2024-09-25 RX ADMIN — TAMSULOSIN HYDROCHLORIDE 0.4 MG: 0.4 CAPSULE ORAL at 08:53

## 2024-09-25 RX ADMIN — OXYCODONE HYDROCHLORIDE AND ACETAMINOPHEN 1 TABLET: 5; 325 TABLET ORAL at 12:15

## 2024-09-25 RX ADMIN — PANCRELIPASE LIPASE, PANCRELIPASE PROTEASE, PANCRELIPASE AMYLASE 5000 UNITS: 5000; 17000; 24000 CAPSULE, DELAYED RELEASE ORAL at 08:53

## 2024-09-25 RX ADMIN — DIPHENHYDRAMINE HCL 25 MG: 25 TABLET ORAL at 09:33

## 2024-09-25 ASSESSMENT — PAIN SCALES - GENERAL
PAINLEVEL_OUTOF10: 6
PAINLEVEL_OUTOF10: 7
PAINLEVEL_OUTOF10: 7
PAINLEVEL_OUTOF10: 9
PAINLEVEL_OUTOF10: 2
PAINLEVEL_OUTOF10: 1
PAINLEVEL_OUTOF10: 6
PAINLEVEL_OUTOF10: 8

## 2024-09-25 ASSESSMENT — PAIN DESCRIPTION - FREQUENCY
FREQUENCY: CONTINUOUS

## 2024-09-25 ASSESSMENT — PAIN DESCRIPTION - ORIENTATION
ORIENTATION: RIGHT;MID;LOWER

## 2024-09-25 ASSESSMENT — PAIN DESCRIPTION - ONSET
ONSET: ON-GOING

## 2024-09-25 ASSESSMENT — PAIN DESCRIPTION - DESCRIPTORS
DESCRIPTORS: CRAMPING;STABBING
DESCRIPTORS: ACHING;DISCOMFORT;JABBING
DESCRIPTORS: CRAMPING;STABBING
DESCRIPTORS: ACHING;DISCOMFORT;JABBING
DESCRIPTORS: CRAMPING;STABBING

## 2024-09-25 ASSESSMENT — PAIN DESCRIPTION - LOCATION
LOCATION: ABDOMEN

## 2024-09-25 ASSESSMENT — PAIN DESCRIPTION - PAIN TYPE
TYPE: CHRONIC PAIN

## 2024-09-25 NOTE — CARE COORDINATION
Discharge planning    Patient refusing OT therapy. RN, OT and writer spoke with patient and he states that he will try in an hour.

## 2024-09-25 NOTE — CARE COORDINATION
Social Work-Spoke with patient. Discussed with patient that he will need to participate in OT today for precert. He is agreeable. Updated therapy. Beto

## 2024-09-25 NOTE — CARE COORDINATION
Noam Work-Spoke with [facility]. They are requesting additional therapy notes for precert. Notified PT. Beto

## 2024-09-26 LAB
ANION GAP SERPL CALCULATED.3IONS-SCNC: 10 MMOL/L (ref 9–17)
BASOPHILS # BLD: 0.13 K/UL (ref 0–0.2)
BASOPHILS NFR BLD: 1 % (ref 0–2)
BUN SERPL-MCNC: 34 MG/DL (ref 8–23)
BUN/CREAT SERPL: 31 (ref 9–20)
CALCIUM SERPL-MCNC: 8.1 MG/DL (ref 8.6–10.4)
CHLORIDE SERPL-SCNC: 109 MMOL/L (ref 98–107)
CO2 SERPL-SCNC: 22 MMOL/L (ref 20–31)
CREAT SERPL-MCNC: 1.1 MG/DL (ref 0.7–1.2)
EOSINOPHIL # BLD: 0.63 K/UL (ref 0–0.44)
EOSINOPHILS RELATIVE PERCENT: 6 % (ref 1–4)
ERYTHROCYTE [DISTWIDTH] IN BLOOD BY AUTOMATED COUNT: 14.3 % (ref 11.8–14.4)
GFR, ESTIMATED: 74 ML/MIN/1.73M2
GLUCOSE BLD-MCNC: 117 MG/DL (ref 75–110)
GLUCOSE BLD-MCNC: 148 MG/DL (ref 75–110)
GLUCOSE BLD-MCNC: 237 MG/DL (ref 75–110)
GLUCOSE BLD-MCNC: 255 MG/DL (ref 75–110)
GLUCOSE SERPL-MCNC: 127 MG/DL (ref 70–99)
HCT VFR BLD AUTO: 28.3 % (ref 40.7–50.3)
HGB BLD-MCNC: 8.4 G/DL (ref 13–17)
IMM GRANULOCYTES # BLD AUTO: 0.05 K/UL (ref 0–0.3)
IMM GRANULOCYTES NFR BLD: 1 %
INR PPP: 1.9
LYMPHOCYTES NFR BLD: 2.64 K/UL (ref 1.1–3.7)
LYMPHOCYTES RELATIVE PERCENT: 24 % (ref 24–43)
MCH RBC QN AUTO: 28.5 PG (ref 25.2–33.5)
MCHC RBC AUTO-ENTMCNC: 29.7 G/DL (ref 28.4–34.8)
MCV RBC AUTO: 95.9 FL (ref 82.6–102.9)
MONOCYTES NFR BLD: 1.04 K/UL (ref 0.1–1.2)
MONOCYTES NFR BLD: 9 % (ref 3–12)
NEUTROPHILS NFR BLD: 59 % (ref 36–65)
NEUTS SEG NFR BLD: 6.53 K/UL (ref 1.5–8.1)
NRBC BLD-RTO: 0 PER 100 WBC
PLATELET # BLD AUTO: 389 K/UL (ref 138–453)
PMV BLD AUTO: 9.6 FL (ref 8.1–13.5)
POTASSIUM SERPL-SCNC: 4.5 MMOL/L (ref 3.7–5.3)
PROTHROMBIN TIME: 21.8 SEC (ref 11.5–14.2)
RBC # BLD AUTO: 2.95 M/UL (ref 4.21–5.77)
SODIUM SERPL-SCNC: 141 MMOL/L (ref 135–144)
WBC OTHER # BLD: 11 K/UL (ref 3.5–11.3)

## 2024-09-26 PROCEDURE — 85610 PROTHROMBIN TIME: CPT

## 2024-09-26 PROCEDURE — 6370000000 HC RX 637 (ALT 250 FOR IP): Performed by: INTERNAL MEDICINE

## 2024-09-26 PROCEDURE — 82947 ASSAY GLUCOSE BLOOD QUANT: CPT

## 2024-09-26 PROCEDURE — 2580000003 HC RX 258: Performed by: INTERNAL MEDICINE

## 2024-09-26 PROCEDURE — 80048 BASIC METABOLIC PNL TOTAL CA: CPT

## 2024-09-26 PROCEDURE — 99232 SBSQ HOSP IP/OBS MODERATE 35: CPT | Performed by: INTERNAL MEDICINE

## 2024-09-26 PROCEDURE — 99232 SBSQ HOSP IP/OBS MODERATE 35: CPT | Performed by: STUDENT IN AN ORGANIZED HEALTH CARE EDUCATION/TRAINING PROGRAM

## 2024-09-26 PROCEDURE — 6370000000 HC RX 637 (ALT 250 FOR IP)

## 2024-09-26 PROCEDURE — 2060000000 HC ICU INTERMEDIATE R&B

## 2024-09-26 PROCEDURE — 6370000000 HC RX 637 (ALT 250 FOR IP): Performed by: UROLOGY

## 2024-09-26 PROCEDURE — 6370000000 HC RX 637 (ALT 250 FOR IP): Performed by: STUDENT IN AN ORGANIZED HEALTH CARE EDUCATION/TRAINING PROGRAM

## 2024-09-26 PROCEDURE — 6370000000 HC RX 637 (ALT 250 FOR IP): Performed by: FAMILY MEDICINE

## 2024-09-26 PROCEDURE — 85025 COMPLETE CBC W/AUTO DIFF WBC: CPT

## 2024-09-26 PROCEDURE — 36415 COLL VENOUS BLD VENIPUNCTURE: CPT

## 2024-09-26 RX ORDER — WARFARIN SODIUM 5 MG/1
5 TABLET ORAL
Status: ACTIVE | OUTPATIENT
Start: 2024-09-26 | End: 2024-09-27

## 2024-09-26 RX ORDER — VANCOMYCIN HYDROCHLORIDE 125 MG/1
125 CAPSULE ORAL 4 TIMES DAILY
Status: DISCONTINUED | OUTPATIENT
Start: 2024-09-26 | End: 2024-09-29 | Stop reason: HOSPADM

## 2024-09-26 RX ORDER — MAGNESIUM HYDROXIDE/ALUMINUM HYDROXICE/SIMETHICONE 120; 1200; 1200 MG/30ML; MG/30ML; MG/30ML
30 SUSPENSION ORAL EVERY 6 HOURS PRN
Status: DISCONTINUED | OUTPATIENT
Start: 2024-09-26 | End: 2024-09-29 | Stop reason: HOSPADM

## 2024-09-26 RX ADMIN — PANCRELIPASE LIPASE, PANCRELIPASE PROTEASE, PANCRELIPASE AMYLASE 20000 UNITS: 20000; 63000; 84000 CAPSULE, DELAYED RELEASE ORAL at 18:12

## 2024-09-26 RX ADMIN — TAMSULOSIN HYDROCHLORIDE 0.4 MG: 0.4 CAPSULE ORAL at 21:20

## 2024-09-26 RX ADMIN — Medication 2 CAPSULE: at 09:37

## 2024-09-26 RX ADMIN — VANCOMYCIN HYDROCHLORIDE 125 MG: 125 CAPSULE ORAL at 21:20

## 2024-09-26 RX ADMIN — GUAIFENESIN 600 MG: 600 TABLET ORAL at 21:20

## 2024-09-26 RX ADMIN — INSULIN LISPRO 2 UNITS: 100 INJECTION, SOLUTION INTRAVENOUS; SUBCUTANEOUS at 12:49

## 2024-09-26 RX ADMIN — OXYCODONE HYDROCHLORIDE AND ACETAMINOPHEN 1 TABLET: 5; 325 TABLET ORAL at 21:19

## 2024-09-26 RX ADMIN — TAMSULOSIN HYDROCHLORIDE 0.4 MG: 0.4 CAPSULE ORAL at 09:38

## 2024-09-26 RX ADMIN — PANCRELIPASE LIPASE, PANCRELIPASE PROTEASE, PANCRELIPASE AMYLASE 5000 UNITS: 5000; 17000; 24000 CAPSULE, DELAYED RELEASE ORAL at 12:48

## 2024-09-26 RX ADMIN — SODIUM BICARBONATE 1300 MG: 650 TABLET ORAL at 09:37

## 2024-09-26 RX ADMIN — QUETIAPINE FUMARATE 50 MG: 25 TABLET ORAL at 21:19

## 2024-09-26 RX ADMIN — OXYCODONE HYDROCHLORIDE AND ACETAMINOPHEN 1 TABLET: 5; 325 TABLET ORAL at 12:48

## 2024-09-26 RX ADMIN — GUAIFENESIN 600 MG: 600 TABLET ORAL at 09:47

## 2024-09-26 RX ADMIN — PANCRELIPASE LIPASE, PANCRELIPASE PROTEASE, PANCRELIPASE AMYLASE 5000 UNITS: 5000; 17000; 24000 CAPSULE, DELAYED RELEASE ORAL at 09:37

## 2024-09-26 RX ADMIN — SODIUM BICARBONATE 1300 MG: 650 TABLET ORAL at 21:20

## 2024-09-26 RX ADMIN — SODIUM CHLORIDE: 9 INJECTION, SOLUTION INTRAVENOUS at 05:54

## 2024-09-26 RX ADMIN — Medication 2000 UNITS: at 09:38

## 2024-09-26 RX ADMIN — PANCRELIPASE LIPASE, PANCRELIPASE PROTEASE, PANCRELIPASE AMYLASE 5000 UNITS: 5000; 17000; 24000 CAPSULE, DELAYED RELEASE ORAL at 18:12

## 2024-09-26 RX ADMIN — PANCRELIPASE LIPASE, PANCRELIPASE PROTEASE, PANCRELIPASE AMYLASE 20000 UNITS: 20000; 63000; 84000 CAPSULE, DELAYED RELEASE ORAL at 09:37

## 2024-09-26 RX ADMIN — ALUMINUM HYDROXIDE, MAGNESIUM HYDROXIDE, AND SIMETHICONE 30 ML: 1200; 120; 1200 SUSPENSION ORAL at 14:50

## 2024-09-26 RX ADMIN — INSULIN GLARGINE 18 UNITS: 100 INJECTION, SOLUTION SUBCUTANEOUS at 09:38

## 2024-09-26 RX ADMIN — HYOSCYAMINE SULFATE 0.12 MG: 0.12 TABLET SUBLINGUAL at 12:48

## 2024-09-26 RX ADMIN — OXYCODONE HYDROCHLORIDE AND ACETAMINOPHEN 1 TABLET: 5; 325 TABLET ORAL at 03:19

## 2024-09-26 RX ADMIN — PANCRELIPASE LIPASE, PANCRELIPASE PROTEASE, PANCRELIPASE AMYLASE 20000 UNITS: 20000; 63000; 84000 CAPSULE, DELAYED RELEASE ORAL at 12:48

## 2024-09-26 RX ADMIN — CHOLESTYRAMINE 4 G: 4 POWDER, FOR SUSPENSION ORAL at 16:12

## 2024-09-26 RX ADMIN — Medication 2 CAPSULE: at 18:12

## 2024-09-26 RX ADMIN — DIPHENHYDRAMINE HCL 25 MG: 25 TABLET ORAL at 09:37

## 2024-09-26 ASSESSMENT — PAIN SCALES - GENERAL
PAINLEVEL_OUTOF10: 8
PAINLEVEL_OUTOF10: 8
PAINLEVEL_OUTOF10: 6
PAINLEVEL_OUTOF10: 5
PAINLEVEL_OUTOF10: 5
PAINLEVEL_OUTOF10: 2
PAINLEVEL_OUTOF10: 8
PAINLEVEL_OUTOF10: 5

## 2024-09-26 ASSESSMENT — PAIN DESCRIPTION - LOCATION
LOCATION: GROIN
LOCATION: GROIN
LOCATION: ABDOMEN
LOCATION: GROIN
LOCATION: ABDOMEN

## 2024-09-26 ASSESSMENT — PAIN DESCRIPTION - DESCRIPTORS
DESCRIPTORS: ACHING;DISCOMFORT
DESCRIPTORS: PINS AND NEEDLES

## 2024-09-26 ASSESSMENT — PAIN DESCRIPTION - FREQUENCY
FREQUENCY: CONTINUOUS
FREQUENCY: CONTINUOUS

## 2024-09-26 ASSESSMENT — PAIN - FUNCTIONAL ASSESSMENT
PAIN_FUNCTIONAL_ASSESSMENT: PREVENTS OR INTERFERES SOME ACTIVE ACTIVITIES AND ADLS
PAIN_FUNCTIONAL_ASSESSMENT: ACTIVITIES ARE NOT PREVENTED
PAIN_FUNCTIONAL_ASSESSMENT: ACTIVITIES ARE NOT PREVENTED

## 2024-09-26 ASSESSMENT — PAIN DESCRIPTION - PAIN TYPE
TYPE: ACUTE PAIN
TYPE: ACUTE PAIN

## 2024-09-26 ASSESSMENT — PAIN DESCRIPTION - ORIENTATION
ORIENTATION: MID

## 2024-09-26 ASSESSMENT — PAIN DESCRIPTION - ONSET
ONSET: ON-GOING
ONSET: ON-GOING

## 2024-09-27 ENCOUNTER — ANESTHESIA (OUTPATIENT)
Dept: OPERATING ROOM | Age: 69
End: 2024-09-27
Payer: COMMERCIAL

## 2024-09-27 ENCOUNTER — ANESTHESIA EVENT (OUTPATIENT)
Dept: OPERATING ROOM | Age: 69
End: 2024-09-27
Payer: COMMERCIAL

## 2024-09-27 LAB
ANION GAP SERPL CALCULATED.3IONS-SCNC: 10 MMOL/L (ref 9–17)
BASOPHILS # BLD: 0.12 K/UL (ref 0–0.2)
BASOPHILS NFR BLD: 1 % (ref 0–2)
BILIRUB UR QL STRIP: NEGATIVE
BUN SERPL-MCNC: 32 MG/DL (ref 8–23)
BUN/CREAT SERPL: 32 (ref 9–20)
CALCIUM SERPL-MCNC: 8.7 MG/DL (ref 8.6–10.4)
CHLORIDE SERPL-SCNC: 109 MMOL/L (ref 98–107)
CLARITY UR: ABNORMAL
CO2 SERPL-SCNC: 23 MMOL/L (ref 20–31)
COLOR UR: YELLOW
CREAT SERPL-MCNC: 1 MG/DL (ref 0.7–1.2)
EOSINOPHIL # BLD: 0.63 K/UL (ref 0–0.44)
EOSINOPHILS RELATIVE PERCENT: 5 % (ref 1–4)
EPI CELLS #/AREA URNS HPF: NORMAL /HPF (ref 0–5)
ERYTHROCYTE [DISTWIDTH] IN BLOOD BY AUTOMATED COUNT: 14.3 % (ref 11.8–14.4)
GFR, ESTIMATED: 83 ML/MIN/1.73M2
GLUCOSE BLD-MCNC: 146 MG/DL (ref 75–110)
GLUCOSE BLD-MCNC: 356 MG/DL (ref 75–110)
GLUCOSE BLD-MCNC: 364 MG/DL (ref 75–110)
GLUCOSE BLD-MCNC: 90 MG/DL (ref 75–110)
GLUCOSE SERPL-MCNC: 114 MG/DL (ref 70–99)
GLUCOSE UR STRIP-MCNC: NEGATIVE MG/DL
HCT VFR BLD AUTO: 29.3 % (ref 40.7–50.3)
HGB BLD-MCNC: 8.8 G/DL (ref 13–17)
HGB UR QL STRIP.AUTO: ABNORMAL
IMM GRANULOCYTES # BLD AUTO: 0.04 K/UL (ref 0–0.3)
IMM GRANULOCYTES NFR BLD: 0 %
INR PPP: 1.6
KETONES UR STRIP-MCNC: NEGATIVE MG/DL
LEUKOCYTE ESTERASE UR QL STRIP: ABNORMAL
LYMPHOCYTES NFR BLD: 2.35 K/UL (ref 1.1–3.7)
LYMPHOCYTES RELATIVE PERCENT: 19 % (ref 24–43)
MCH RBC QN AUTO: 28.7 PG (ref 25.2–33.5)
MCHC RBC AUTO-ENTMCNC: 30 G/DL (ref 28.4–34.8)
MCV RBC AUTO: 95.4 FL (ref 82.6–102.9)
MONOCYTES NFR BLD: 0.88 K/UL (ref 0.1–1.2)
MONOCYTES NFR BLD: 7 % (ref 3–12)
NEUTROPHILS NFR BLD: 68 % (ref 36–65)
NEUTS SEG NFR BLD: 8.33 K/UL (ref 1.5–8.1)
NITRITE UR QL STRIP: NEGATIVE
NRBC BLD-RTO: 0 PER 100 WBC
PH UR STRIP: 6 [PH] (ref 5–8)
PLATELET # BLD AUTO: 410 K/UL (ref 138–453)
PMV BLD AUTO: 9.7 FL (ref 8.1–13.5)
POTASSIUM SERPL-SCNC: 4.6 MMOL/L (ref 3.7–5.3)
PROT UR STRIP-MCNC: ABNORMAL MG/DL
PROTHROMBIN TIME: 18.8 SEC (ref 11.5–14.2)
RBC # BLD AUTO: 3.07 M/UL (ref 4.21–5.77)
RBC #/AREA URNS HPF: NORMAL /HPF (ref 0–2)
SODIUM SERPL-SCNC: 142 MMOL/L (ref 135–144)
SP GR UR STRIP: 1.02 (ref 1–1.03)
UROBILINOGEN UR STRIP-ACNC: NORMAL EU/DL (ref 0–1)
WBC #/AREA URNS HPF: NORMAL /HPF (ref 0–5)
WBC OTHER # BLD: 12.4 K/UL (ref 3.5–11.3)

## 2024-09-27 PROCEDURE — 2709999900 HC NON-CHARGEABLE SUPPLY: Performed by: UROLOGY

## 2024-09-27 PROCEDURE — 6360000002 HC RX W HCPCS: Performed by: UROLOGY

## 2024-09-27 PROCEDURE — 6370000000 HC RX 637 (ALT 250 FOR IP): Performed by: UROLOGY

## 2024-09-27 PROCEDURE — 3600000012 HC SURGERY LEVEL 2 ADDTL 15MIN: Performed by: UROLOGY

## 2024-09-27 PROCEDURE — 6370000000 HC RX 637 (ALT 250 FOR IP): Performed by: FAMILY MEDICINE

## 2024-09-27 PROCEDURE — 2580000003 HC RX 258: Performed by: UROLOGY

## 2024-09-27 PROCEDURE — 36415 COLL VENOUS BLD VENIPUNCTURE: CPT

## 2024-09-27 PROCEDURE — 88120 CYTP URNE 3-5 PROBES EA SPEC: CPT

## 2024-09-27 PROCEDURE — 87086 URINE CULTURE/COLONY COUNT: CPT

## 2024-09-27 PROCEDURE — 97530 THERAPEUTIC ACTIVITIES: CPT

## 2024-09-27 PROCEDURE — 6370000000 HC RX 637 (ALT 250 FOR IP): Performed by: STUDENT IN AN ORGANIZED HEALTH CARE EDUCATION/TRAINING PROGRAM

## 2024-09-27 PROCEDURE — 97535 SELF CARE MNGMENT TRAINING: CPT

## 2024-09-27 PROCEDURE — 85610 PROTHROMBIN TIME: CPT

## 2024-09-27 PROCEDURE — 80048 BASIC METABOLIC PNL TOTAL CA: CPT

## 2024-09-27 PROCEDURE — 99232 SBSQ HOSP IP/OBS MODERATE 35: CPT | Performed by: STUDENT IN AN ORGANIZED HEALTH CARE EDUCATION/TRAINING PROGRAM

## 2024-09-27 PROCEDURE — 3700000001 HC ADD 15 MINUTES (ANESTHESIA): Performed by: UROLOGY

## 2024-09-27 PROCEDURE — 3700000000 HC ANESTHESIA ATTENDED CARE: Performed by: UROLOGY

## 2024-09-27 PROCEDURE — 3600000002 HC SURGERY LEVEL 2 BASE: Performed by: UROLOGY

## 2024-09-27 PROCEDURE — 99232 SBSQ HOSP IP/OBS MODERATE 35: CPT | Performed by: INTERNAL MEDICINE

## 2024-09-27 PROCEDURE — 2580000003 HC RX 258: Performed by: INTERNAL MEDICINE

## 2024-09-27 PROCEDURE — 6360000002 HC RX W HCPCS: Performed by: NURSE ANESTHETIST, CERTIFIED REGISTERED

## 2024-09-27 PROCEDURE — 85025 COMPLETE CBC W/AUTO DIFF WBC: CPT

## 2024-09-27 PROCEDURE — 0T768DZ DILATION OF RIGHT URETER WITH INTRALUMINAL DEVICE, VIA NATURAL OR ARTIFICIAL OPENING ENDOSCOPIC: ICD-10-PCS | Performed by: UROLOGY

## 2024-09-27 PROCEDURE — 6360000002 HC RX W HCPCS: Performed by: NURSE PRACTITIONER

## 2024-09-27 PROCEDURE — 81001 URINALYSIS AUTO W/SCOPE: CPT

## 2024-09-27 PROCEDURE — 82947 ASSAY GLUCOSE BLOOD QUANT: CPT

## 2024-09-27 PROCEDURE — 82365 CALCULUS SPECTROSCOPY: CPT

## 2024-09-27 PROCEDURE — 2060000000 HC ICU INTERMEDIATE R&B

## 2024-09-27 PROCEDURE — 7100000001 HC PACU RECOVERY - ADDTL 15 MIN: Performed by: UROLOGY

## 2024-09-27 PROCEDURE — 2500000003 HC RX 250 WO HCPCS: Performed by: NURSE ANESTHETIST, CERTIFIED REGISTERED

## 2024-09-27 PROCEDURE — 6370000000 HC RX 637 (ALT 250 FOR IP): Performed by: NURSE PRACTITIONER

## 2024-09-27 PROCEDURE — 7100000000 HC PACU RECOVERY - FIRST 15 MIN: Performed by: UROLOGY

## 2024-09-27 PROCEDURE — 0TC68ZZ EXTIRPATION OF MATTER FROM RIGHT URETER, VIA NATURAL OR ARTIFICIAL OPENING ENDOSCOPIC: ICD-10-PCS | Performed by: UROLOGY

## 2024-09-27 RX ORDER — WARFARIN SODIUM 10 MG/1
10 TABLET ORAL
Status: COMPLETED | OUTPATIENT
Start: 2024-09-27 | End: 2024-09-27

## 2024-09-27 RX ORDER — MEPERIDINE HYDROCHLORIDE 50 MG/ML
12.5 INJECTION INTRAMUSCULAR; INTRAVENOUS; SUBCUTANEOUS EVERY 5 MIN PRN
Status: DISCONTINUED | OUTPATIENT
Start: 2024-09-27 | End: 2024-09-27 | Stop reason: HOSPADM

## 2024-09-27 RX ORDER — LABETALOL HYDROCHLORIDE 5 MG/ML
20 INJECTION, SOLUTION INTRAVENOUS
Status: DISCONTINUED | OUTPATIENT
Start: 2024-09-27 | End: 2024-09-29 | Stop reason: HOSPADM

## 2024-09-27 RX ORDER — FENTANYL CITRATE 50 UG/ML
INJECTION, SOLUTION INTRAMUSCULAR; INTRAVENOUS
Status: DISCONTINUED | OUTPATIENT
Start: 2024-09-27 | End: 2024-09-27 | Stop reason: SDUPTHER

## 2024-09-27 RX ORDER — SODIUM CHLORIDE 9 MG/ML
INJECTION, SOLUTION INTRAVENOUS PRN
Status: DISCONTINUED | OUTPATIENT
Start: 2024-09-27 | End: 2024-09-27 | Stop reason: HOSPADM

## 2024-09-27 RX ORDER — SODIUM CHLORIDE 0.9 % (FLUSH) 0.9 %
5-40 SYRINGE (ML) INJECTION PRN
Status: DISCONTINUED | OUTPATIENT
Start: 2024-09-27 | End: 2024-09-27 | Stop reason: HOSPADM

## 2024-09-27 RX ORDER — LIDOCAINE HYDROCHLORIDE 20 MG/ML
INJECTION, SOLUTION INFILTRATION; PERINEURAL
Status: DISCONTINUED | OUTPATIENT
Start: 2024-09-27 | End: 2024-09-27 | Stop reason: SDUPTHER

## 2024-09-27 RX ORDER — DIPHENHYDRAMINE HYDROCHLORIDE 50 MG/ML
12.5 INJECTION INTRAMUSCULAR; INTRAVENOUS
Status: DISCONTINUED | OUTPATIENT
Start: 2024-09-27 | End: 2024-09-27 | Stop reason: HOSPADM

## 2024-09-27 RX ORDER — FENTANYL CITRATE 50 UG/ML
25 INJECTION, SOLUTION INTRAMUSCULAR; INTRAVENOUS EVERY 5 MIN PRN
Status: DISCONTINUED | OUTPATIENT
Start: 2024-09-27 | End: 2024-09-27 | Stop reason: HOSPADM

## 2024-09-27 RX ORDER — SODIUM CHLORIDE 0.9 % (FLUSH) 0.9 %
5-40 SYRINGE (ML) INJECTION EVERY 12 HOURS SCHEDULED
Status: DISCONTINUED | OUTPATIENT
Start: 2024-09-27 | End: 2024-09-27 | Stop reason: HOSPADM

## 2024-09-27 RX ORDER — CALCIUM CARBONATE 500 MG/1
1000 TABLET, CHEWABLE ORAL 3 TIMES DAILY PRN
Status: DISCONTINUED | OUTPATIENT
Start: 2024-09-27 | End: 2024-09-29 | Stop reason: HOSPADM

## 2024-09-27 RX ORDER — LIDOCAINE HYDROCHLORIDE 20 MG/ML
JELLY TOPICAL PRN
Status: DISCONTINUED | OUTPATIENT
Start: 2024-09-27 | End: 2024-09-27 | Stop reason: ALTCHOICE

## 2024-09-27 RX ORDER — METOCLOPRAMIDE HYDROCHLORIDE 5 MG/ML
10 INJECTION INTRAMUSCULAR; INTRAVENOUS
Status: DISCONTINUED | OUTPATIENT
Start: 2024-09-27 | End: 2024-09-27 | Stop reason: HOSPADM

## 2024-09-27 RX ORDER — HYDROMORPHONE HYDROCHLORIDE 1 MG/ML
0.5 INJECTION, SOLUTION INTRAMUSCULAR; INTRAVENOUS; SUBCUTANEOUS EVERY 5 MIN PRN
Status: DISCONTINUED | OUTPATIENT
Start: 2024-09-27 | End: 2024-09-27 | Stop reason: HOSPADM

## 2024-09-27 RX ORDER — PROCHLORPERAZINE EDISYLATE 5 MG/ML
10 INJECTION INTRAMUSCULAR; INTRAVENOUS
Status: DISCONTINUED | OUTPATIENT
Start: 2024-09-27 | End: 2024-09-27 | Stop reason: HOSPADM

## 2024-09-27 RX ORDER — PROPOFOL 10 MG/ML
INJECTION, EMULSION INTRAVENOUS
Status: DISCONTINUED | OUTPATIENT
Start: 2024-09-27 | End: 2024-09-27 | Stop reason: SDUPTHER

## 2024-09-27 RX ORDER — NALOXONE HYDROCHLORIDE 0.4 MG/ML
INJECTION, SOLUTION INTRAMUSCULAR; INTRAVENOUS; SUBCUTANEOUS PRN
Status: DISCONTINUED | OUTPATIENT
Start: 2024-09-27 | End: 2024-09-27 | Stop reason: HOSPADM

## 2024-09-27 RX ORDER — OXYCODONE HYDROCHLORIDE 5 MG/1
5 TABLET ORAL
Status: DISCONTINUED | OUTPATIENT
Start: 2024-09-27 | End: 2024-09-27 | Stop reason: HOSPADM

## 2024-09-27 RX ADMIN — QUETIAPINE FUMARATE 50 MG: 25 TABLET ORAL at 20:50

## 2024-09-27 RX ADMIN — VANCOMYCIN HYDROCHLORIDE 125 MG: 125 CAPSULE ORAL at 17:21

## 2024-09-27 RX ADMIN — FENTANYL CITRATE 25 MCG: 50 INJECTION INTRAMUSCULAR; INTRAVENOUS at 08:53

## 2024-09-27 RX ADMIN — SODIUM BICARBONATE 1300 MG: 650 TABLET ORAL at 20:49

## 2024-09-27 RX ADMIN — PROPOFOL 125 MCG/KG/MIN: 10 INJECTION, EMULSION INTRAVENOUS at 08:39

## 2024-09-27 RX ADMIN — DIPHENHYDRAMINE HYDROCHLORIDE 25 MG: 50 INJECTION INTRAMUSCULAR; INTRAVENOUS at 01:50

## 2024-09-27 RX ADMIN — ALUMINUM HYDROXIDE, MAGNESIUM HYDROXIDE, AND SIMETHICONE 30 ML: 1200; 120; 1200 SUSPENSION ORAL at 15:04

## 2024-09-27 RX ADMIN — SODIUM CHLORIDE, PRESERVATIVE FREE 10 ML: 5 INJECTION INTRAVENOUS at 20:51

## 2024-09-27 RX ADMIN — Medication 1000 MG: at 06:41

## 2024-09-27 RX ADMIN — CHOLESTYRAMINE 4 G: 4 POWDER, FOR SUSPENSION ORAL at 12:14

## 2024-09-27 RX ADMIN — WARFARIN SODIUM 10 MG: 10 TABLET ORAL at 17:20

## 2024-09-27 RX ADMIN — SODIUM CHLORIDE, PRESERVATIVE FREE 10 ML: 5 INJECTION INTRAVENOUS at 20:52

## 2024-09-27 RX ADMIN — FENTANYL CITRATE 25 MCG: 50 INJECTION INTRAMUSCULAR; INTRAVENOUS at 08:39

## 2024-09-27 RX ADMIN — PANCRELIPASE LIPASE, PANCRELIPASE PROTEASE, PANCRELIPASE AMYLASE 5000 UNITS: 5000; 17000; 24000 CAPSULE, DELAYED RELEASE ORAL at 17:20

## 2024-09-27 RX ADMIN — DIPHENHYDRAMINE HYDROCHLORIDE 25 MG: 50 INJECTION INTRAMUSCULAR; INTRAVENOUS at 20:49

## 2024-09-27 RX ADMIN — LIDOCAINE HYDROCHLORIDE 50 MG: 20 INJECTION, SOLUTION INFILTRATION; PERINEURAL at 08:39

## 2024-09-27 RX ADMIN — VANCOMYCIN HYDROCHLORIDE 125 MG: 125 CAPSULE ORAL at 20:50

## 2024-09-27 RX ADMIN — PANCRELIPASE LIPASE, PANCRELIPASE PROTEASE, PANCRELIPASE AMYLASE 20000 UNITS: 20000; 63000; 84000 CAPSULE, DELAYED RELEASE ORAL at 17:20

## 2024-09-27 RX ADMIN — PANCRELIPASE LIPASE, PANCRELIPASE PROTEASE, PANCRELIPASE AMYLASE 20000 UNITS: 20000; 63000; 84000 CAPSULE, DELAYED RELEASE ORAL at 12:13

## 2024-09-27 RX ADMIN — SODIUM CHLORIDE: 9 INJECTION, SOLUTION INTRAVENOUS at 20:56

## 2024-09-27 RX ADMIN — DIPHENHYDRAMINE HCL 25 MG: 25 TABLET ORAL at 14:13

## 2024-09-27 RX ADMIN — TAMSULOSIN HYDROCHLORIDE 0.4 MG: 0.4 CAPSULE ORAL at 20:50

## 2024-09-27 RX ADMIN — OXYCODONE HYDROCHLORIDE AND ACETAMINOPHEN 1 TABLET: 5; 325 TABLET ORAL at 05:14

## 2024-09-27 RX ADMIN — OXYCODONE HYDROCHLORIDE AND ACETAMINOPHEN 1 TABLET: 5; 325 TABLET ORAL at 20:50

## 2024-09-27 RX ADMIN — PANCRELIPASE LIPASE, PANCRELIPASE PROTEASE, PANCRELIPASE AMYLASE 5000 UNITS: 5000; 17000; 24000 CAPSULE, DELAYED RELEASE ORAL at 12:13

## 2024-09-27 RX ADMIN — INSULIN LISPRO 4 UNITS: 100 INJECTION, SOLUTION INTRAVENOUS; SUBCUTANEOUS at 12:13

## 2024-09-27 RX ADMIN — INSULIN LISPRO 4 UNITS: 100 INJECTION, SOLUTION INTRAVENOUS; SUBCUTANEOUS at 21:04

## 2024-09-27 RX ADMIN — GUAIFENESIN 600 MG: 600 TABLET ORAL at 20:50

## 2024-09-27 RX ADMIN — SODIUM CHLORIDE: 9 INJECTION, SOLUTION INTRAVENOUS at 01:52

## 2024-09-27 RX ADMIN — OXYCODONE HYDROCHLORIDE AND ACETAMINOPHEN 1 TABLET: 5; 325 TABLET ORAL at 14:11

## 2024-09-27 RX ADMIN — VANCOMYCIN HYDROCHLORIDE 125 MG: 125 CAPSULE ORAL at 12:13

## 2024-09-27 RX ADMIN — Medication 2 CAPSULE: at 17:21

## 2024-09-27 ASSESSMENT — PAIN SCALES - GENERAL
PAINLEVEL_OUTOF10: 7
PAINLEVEL_OUTOF10: 4
PAINLEVEL_OUTOF10: 5
PAINLEVEL_OUTOF10: 10
PAINLEVEL_OUTOF10: 8
PAINLEVEL_OUTOF10: 8

## 2024-09-27 ASSESSMENT — PAIN DESCRIPTION - ONSET
ONSET: ON-GOING
ONSET: ON-GOING

## 2024-09-27 ASSESSMENT — PAIN - FUNCTIONAL ASSESSMENT
PAIN_FUNCTIONAL_ASSESSMENT: PREVENTS OR INTERFERES SOME ACTIVE ACTIVITIES AND ADLS
PAIN_FUNCTIONAL_ASSESSMENT: ACTIVITIES ARE NOT PREVENTED
PAIN_FUNCTIONAL_ASSESSMENT: ACTIVITIES ARE NOT PREVENTED
PAIN_FUNCTIONAL_ASSESSMENT: FACE, LEGS, ACTIVITY, CRY, AND CONSOLABILITY (FLACC)
PAIN_FUNCTIONAL_ASSESSMENT: PREVENTS OR INTERFERES SOME ACTIVE ACTIVITIES AND ADLS

## 2024-09-27 ASSESSMENT — PAIN DESCRIPTION - PAIN TYPE
TYPE: ACUTE PAIN
TYPE: CHRONIC PAIN
TYPE: ACUTE PAIN

## 2024-09-27 ASSESSMENT — PAIN DESCRIPTION - DESCRIPTORS
DESCRIPTORS: CRAMPING
DESCRIPTORS: ACHING
DESCRIPTORS: CRAMPING
DESCRIPTORS: CRAMPING

## 2024-09-27 ASSESSMENT — PAIN DESCRIPTION - FREQUENCY
FREQUENCY: CONTINUOUS

## 2024-09-27 ASSESSMENT — PAIN DESCRIPTION - LOCATION
LOCATION: ABDOMEN

## 2024-09-27 ASSESSMENT — PAIN DESCRIPTION - ORIENTATION
ORIENTATION: MID

## 2024-09-27 NOTE — CARE COORDINATION
Discharge planning    Social work notes  pending auth on [facility].  Will call and follow up on referral to [facility] a home care agency his daughter wishes to have involved post rehab. Their number is [phone]    Patient to have an inpatient cysto due to gross hematuria.

## 2024-09-27 NOTE — CARE COORDINATION
Social Work-Spoke with [facility]. Insurance called and is requesting updated PT/OT notes. Notified PT/OT. They are seeing patient. WIll faxe the notes as soon as they are in the computer. Beto

## 2024-09-27 NOTE — CARE COORDINATION
Social Work-Updated progress notes, med list, and LOREE sent to [facility]. HENS is completed. Still no prcert. Ivana

## 2024-09-28 LAB
GLUCOSE BLD-MCNC: 147 MG/DL (ref 75–110)
GLUCOSE BLD-MCNC: 168 MG/DL (ref 75–110)
GLUCOSE BLD-MCNC: 198 MG/DL (ref 75–110)
GLUCOSE BLD-MCNC: 287 MG/DL (ref 75–110)
INR PPP: 1.5
MICROORGANISM SPEC CULT: ABNORMAL
PROTHROMBIN TIME: 18.4 SEC (ref 11.5–14.2)
SPECIMEN DESCRIPTION: ABNORMAL

## 2024-09-28 PROCEDURE — 6370000000 HC RX 637 (ALT 250 FOR IP): Performed by: UROLOGY

## 2024-09-28 PROCEDURE — 2580000003 HC RX 258: Performed by: UROLOGY

## 2024-09-28 PROCEDURE — 36415 COLL VENOUS BLD VENIPUNCTURE: CPT

## 2024-09-28 PROCEDURE — 6360000002 HC RX W HCPCS: Performed by: UROLOGY

## 2024-09-28 PROCEDURE — 85610 PROTHROMBIN TIME: CPT

## 2024-09-28 PROCEDURE — 6370000000 HC RX 637 (ALT 250 FOR IP)

## 2024-09-28 PROCEDURE — 82947 ASSAY GLUCOSE BLOOD QUANT: CPT

## 2024-09-28 PROCEDURE — 99233 SBSQ HOSP IP/OBS HIGH 50: CPT | Performed by: INTERNAL MEDICINE

## 2024-09-28 PROCEDURE — 2060000000 HC ICU INTERMEDIATE R&B

## 2024-09-28 PROCEDURE — 99232 SBSQ HOSP IP/OBS MODERATE 35: CPT | Performed by: STUDENT IN AN ORGANIZED HEALTH CARE EDUCATION/TRAINING PROGRAM

## 2024-09-28 RX ORDER — WARFARIN SODIUM 7.5 MG/1
7.5 TABLET ORAL
Status: COMPLETED | OUTPATIENT
Start: 2024-09-28 | End: 2024-09-28

## 2024-09-28 RX ADMIN — DIPHENHYDRAMINE HYDROCHLORIDE 25 MG: 50 INJECTION INTRAMUSCULAR; INTRAVENOUS at 21:23

## 2024-09-28 RX ADMIN — PANCRELIPASE LIPASE, PANCRELIPASE PROTEASE, PANCRELIPASE AMYLASE 20000 UNITS: 20000; 63000; 84000 CAPSULE, DELAYED RELEASE ORAL at 10:40

## 2024-09-28 RX ADMIN — OXYCODONE HYDROCHLORIDE AND ACETAMINOPHEN 1 TABLET: 5; 325 TABLET ORAL at 15:50

## 2024-09-28 RX ADMIN — Medication 2 CAPSULE: at 17:19

## 2024-09-28 RX ADMIN — Medication 2 CAPSULE: at 10:40

## 2024-09-28 RX ADMIN — VANCOMYCIN HYDROCHLORIDE 125 MG: 125 CAPSULE ORAL at 20:34

## 2024-09-28 RX ADMIN — SODIUM BICARBONATE 1300 MG: 650 TABLET ORAL at 20:34

## 2024-09-28 RX ADMIN — VANCOMYCIN HYDROCHLORIDE 125 MG: 125 CAPSULE ORAL at 10:41

## 2024-09-28 RX ADMIN — SODIUM CHLORIDE, PRESERVATIVE FREE 10 ML: 5 INJECTION INTRAVENOUS at 10:41

## 2024-09-28 RX ADMIN — OXYCODONE HYDROCHLORIDE AND ACETAMINOPHEN 1 TABLET: 5; 325 TABLET ORAL at 11:05

## 2024-09-28 RX ADMIN — SODIUM BICARBONATE 1300 MG: 650 TABLET ORAL at 10:41

## 2024-09-28 RX ADMIN — OXYCODONE HYDROCHLORIDE AND ACETAMINOPHEN 1 TABLET: 5; 325 TABLET ORAL at 03:10

## 2024-09-28 RX ADMIN — HYOSCYAMINE SULFATE 0.12 MG: 0.12 TABLET SUBLINGUAL at 10:48

## 2024-09-28 RX ADMIN — WARFARIN SODIUM 7.5 MG: 7.5 TABLET ORAL at 18:04

## 2024-09-28 RX ADMIN — GUAIFENESIN 600 MG: 600 TABLET ORAL at 10:40

## 2024-09-28 RX ADMIN — TAMSULOSIN HYDROCHLORIDE 0.4 MG: 0.4 CAPSULE ORAL at 10:40

## 2024-09-28 RX ADMIN — VANCOMYCIN HYDROCHLORIDE 125 MG: 125 CAPSULE ORAL at 14:33

## 2024-09-28 RX ADMIN — PANCRELIPASE LIPASE, PANCRELIPASE PROTEASE, PANCRELIPASE AMYLASE 20000 UNITS: 20000; 63000; 84000 CAPSULE, DELAYED RELEASE ORAL at 17:18

## 2024-09-28 RX ADMIN — INSULIN GLARGINE 18 UNITS: 100 INJECTION, SOLUTION SUBCUTANEOUS at 09:26

## 2024-09-28 RX ADMIN — QUETIAPINE FUMARATE 50 MG: 25 TABLET ORAL at 20:34

## 2024-09-28 RX ADMIN — VANCOMYCIN HYDROCHLORIDE 125 MG: 125 CAPSULE ORAL at 18:04

## 2024-09-28 RX ADMIN — PANCRELIPASE LIPASE, PANCRELIPASE PROTEASE, PANCRELIPASE AMYLASE 5000 UNITS: 5000; 17000; 24000 CAPSULE, DELAYED RELEASE ORAL at 10:40

## 2024-09-28 RX ADMIN — PANCRELIPASE LIPASE, PANCRELIPASE PROTEASE, PANCRELIPASE AMYLASE 5000 UNITS: 5000; 17000; 24000 CAPSULE, DELAYED RELEASE ORAL at 17:19

## 2024-09-28 RX ADMIN — SODIUM CHLORIDE: 9 INJECTION, SOLUTION INTRAVENOUS at 17:26

## 2024-09-28 RX ADMIN — GUAIFENESIN 600 MG: 600 TABLET ORAL at 20:34

## 2024-09-28 RX ADMIN — CHOLESTYRAMINE 4 G: 4 POWDER, FOR SUSPENSION ORAL at 13:54

## 2024-09-28 RX ADMIN — DIPHENHYDRAMINE HCL 25 MG: 25 TABLET ORAL at 11:05

## 2024-09-28 RX ADMIN — DIPHENHYDRAMINE HCL 25 MG: 25 TABLET ORAL at 17:18

## 2024-09-28 RX ADMIN — TAMSULOSIN HYDROCHLORIDE 0.4 MG: 0.4 CAPSULE ORAL at 20:34

## 2024-09-28 RX ADMIN — OXYCODONE HYDROCHLORIDE AND ACETAMINOPHEN 1 TABLET: 5; 325 TABLET ORAL at 20:34

## 2024-09-28 RX ADMIN — Medication 2000 UNITS: at 10:40

## 2024-09-28 ASSESSMENT — PAIN DESCRIPTION - ORIENTATION
ORIENTATION: RIGHT;LEFT;MID
ORIENTATION: MID
ORIENTATION: MID

## 2024-09-28 ASSESSMENT — PAIN DESCRIPTION - LOCATION
LOCATION: ABDOMEN
LOCATION: ABDOMEN
LOCATION: ABDOMEN;LEG
LOCATION: ABDOMEN
LOCATION: ABDOMEN

## 2024-09-28 ASSESSMENT — PAIN DESCRIPTION - DESCRIPTORS
DESCRIPTORS: SHARP;STABBING;ACHING
DESCRIPTORS: CRAMPING
DESCRIPTORS: PINS AND NEEDLES
DESCRIPTORS: ACHING
DESCRIPTORS: PINS AND NEEDLES

## 2024-09-28 ASSESSMENT — PAIN SCALES - GENERAL
PAINLEVEL_OUTOF10: 0
PAINLEVEL_OUTOF10: 4
PAINLEVEL_OUTOF10: 4
PAINLEVEL_OUTOF10: 5
PAINLEVEL_OUTOF10: 7
PAINLEVEL_OUTOF10: 10
PAINLEVEL_OUTOF10: 8
PAINLEVEL_OUTOF10: 8
PAINLEVEL_OUTOF10: 9
PAINLEVEL_OUTOF10: 8

## 2024-09-28 ASSESSMENT — PAIN DESCRIPTION - ONSET
ONSET: ON-GOING

## 2024-09-28 ASSESSMENT — PAIN DESCRIPTION - PAIN TYPE
TYPE: CHRONIC PAIN
TYPE: CHRONIC PAIN
TYPE: ACUTE PAIN

## 2024-09-28 ASSESSMENT — PAIN DESCRIPTION - FREQUENCY
FREQUENCY: CONTINUOUS

## 2024-09-28 NOTE — CARE COORDINATION
Social work: got a call from [facility] approval for [facility]. Good from today to Oct. 5th.   Reference number: [id number]  She will fax ok to fax here [phone] and will need to fac that to [facility] to see so they can take pt.   Will Need philip, Rx and philip if pt has come from the community even for one day.  Will call and advise [facility]. [facility]. Will try to see if pt can go today or tomorrow.  Will try to reach [facility] today to alert them and see if they still have that bed.   Phone for report [phone]  Fax [phone] efax.   Celena Monte[facility]    Social work: got auth spoke to Michelle supervisor ok to send pt tomorrow at noon. Faxed to her fax [phone] at her request to see the auth. Will fax philip and Rx along with hens as ready.  Phone for report [phone]  Fax [phone] main fax.    Celena [facility]    Social work faxes were not working called back to [phone] and ext [phone] and got another fax.  Fax [phone] sent philip and Auth paperwork along with demographics. Will advise pt and see if he wants to call his family or wants her called. Set up for 11:30 am tomorrow. Celena camacho

## 2024-09-28 NOTE — CARE COORDINATION
Social work: spoke to pt's mom and daughter as pt requested. Provided both the phone number to [facility].  Mom Fartun [phone]  Eliot daughter [phone]  Paperwork on floor for [facility] and for insurance approval to go with pt to snf as their fax machines were not working today. 3 were tried but supervisor stated she was expecting him as information was sent to insurance already. Will follow as needed.  Plan is 11:30 am  by [facility]. Pt informed and RN informed. Celena duboisw

## 2024-09-29 VITALS
DIASTOLIC BLOOD PRESSURE: 61 MMHG | RESPIRATION RATE: 18 BRPM | BODY MASS INDEX: 18.69 KG/M2 | SYSTOLIC BLOOD PRESSURE: 128 MMHG | WEIGHT: 141 LBS | TEMPERATURE: 97.9 F | OXYGEN SATURATION: 95 % | HEART RATE: 85 BPM | HEIGHT: 73 IN

## 2024-09-29 LAB
ANION GAP SERPL CALCULATED.3IONS-SCNC: 10 MMOL/L (ref 9–17)
BUN SERPL-MCNC: 29 MG/DL (ref 8–23)
BUN/CREAT SERPL: 29 (ref 9–20)
CALCIUM SERPL-MCNC: 8.6 MG/DL (ref 8.6–10.4)
CHLORIDE SERPL-SCNC: 110 MMOL/L (ref 98–107)
CO2 SERPL-SCNC: 23 MMOL/L (ref 20–31)
CREAT SERPL-MCNC: 1 MG/DL (ref 0.7–1.2)
GFR, ESTIMATED: 83 ML/MIN/1.73M2
GLUCOSE BLD-MCNC: 154 MG/DL (ref 75–110)
GLUCOSE SERPL-MCNC: 160 MG/DL (ref 70–99)
INR PPP: 2.1
POTASSIUM SERPL-SCNC: 4.6 MMOL/L (ref 3.7–5.3)
PROTHROMBIN TIME: 23.5 SEC (ref 11.5–14.2)
SODIUM SERPL-SCNC: 143 MMOL/L (ref 135–144)

## 2024-09-29 PROCEDURE — 6370000000 HC RX 637 (ALT 250 FOR IP): Performed by: UROLOGY

## 2024-09-29 PROCEDURE — 2580000003 HC RX 258: Performed by: UROLOGY

## 2024-09-29 PROCEDURE — 82947 ASSAY GLUCOSE BLOOD QUANT: CPT

## 2024-09-29 PROCEDURE — 80048 BASIC METABOLIC PNL TOTAL CA: CPT

## 2024-09-29 PROCEDURE — 6370000000 HC RX 637 (ALT 250 FOR IP): Performed by: INTERNAL MEDICINE

## 2024-09-29 PROCEDURE — 99239 HOSP IP/OBS DSCHRG MGMT >30: CPT | Performed by: STUDENT IN AN ORGANIZED HEALTH CARE EDUCATION/TRAINING PROGRAM

## 2024-09-29 PROCEDURE — 85610 PROTHROMBIN TIME: CPT

## 2024-09-29 PROCEDURE — 36415 COLL VENOUS BLD VENIPUNCTURE: CPT

## 2024-09-29 RX ORDER — WARFARIN SODIUM 5 MG/1
5 TABLET ORAL
Status: DISCONTINUED | OUTPATIENT
Start: 2024-09-29 | End: 2024-09-29 | Stop reason: HOSPADM

## 2024-09-29 RX ORDER — AMLODIPINE BESYLATE 5 MG/1
5 TABLET ORAL DAILY
Qty: 30 TABLET | Refills: 3 | Status: SHIPPED | OUTPATIENT
Start: 2024-09-29

## 2024-09-29 RX ORDER — AMLODIPINE BESYLATE 5 MG/1
5 TABLET ORAL DAILY
Status: DISCONTINUED | OUTPATIENT
Start: 2024-09-29 | End: 2024-09-29 | Stop reason: HOSPADM

## 2024-09-29 RX ADMIN — PANCRELIPASE LIPASE, PANCRELIPASE PROTEASE, PANCRELIPASE AMYLASE 5000 UNITS: 5000; 17000; 24000 CAPSULE, DELAYED RELEASE ORAL at 09:44

## 2024-09-29 RX ADMIN — VANCOMYCIN HYDROCHLORIDE 125 MG: 125 CAPSULE ORAL at 09:45

## 2024-09-29 RX ADMIN — Medication 2000 UNITS: at 09:45

## 2024-09-29 RX ADMIN — SODIUM BICARBONATE 1300 MG: 650 TABLET ORAL at 09:45

## 2024-09-29 RX ADMIN — AMLODIPINE BESYLATE 5 MG: 5 TABLET ORAL at 09:49

## 2024-09-29 RX ADMIN — GUAIFENESIN 600 MG: 600 TABLET ORAL at 09:45

## 2024-09-29 RX ADMIN — DIPHENHYDRAMINE HCL 25 MG: 25 TABLET ORAL at 09:44

## 2024-09-29 RX ADMIN — ALUMINUM HYDROXIDE, MAGNESIUM HYDROXIDE, AND SIMETHICONE 30 ML: 1200; 120; 1200 SUSPENSION ORAL at 11:49

## 2024-09-29 RX ADMIN — TAMSULOSIN HYDROCHLORIDE 0.4 MG: 0.4 CAPSULE ORAL at 09:45

## 2024-09-29 RX ADMIN — INSULIN GLARGINE 18 UNITS: 100 INJECTION, SOLUTION SUBCUTANEOUS at 09:45

## 2024-09-29 RX ADMIN — Medication 2 CAPSULE: at 09:44

## 2024-09-29 RX ADMIN — ALUMINUM HYDROXIDE, MAGNESIUM HYDROXIDE, AND SIMETHICONE 30 ML: 1200; 120; 1200 SUSPENSION ORAL at 04:46

## 2024-09-29 RX ADMIN — PANCRELIPASE LIPASE, PANCRELIPASE PROTEASE, PANCRELIPASE AMYLASE 20000 UNITS: 20000; 63000; 84000 CAPSULE, DELAYED RELEASE ORAL at 09:44

## 2024-09-29 RX ADMIN — OXYCODONE HYDROCHLORIDE AND ACETAMINOPHEN 1 TABLET: 5; 325 TABLET ORAL at 11:46

## 2024-09-29 RX ADMIN — SODIUM CHLORIDE, PRESERVATIVE FREE 10 ML: 5 INJECTION INTRAVENOUS at 09:46

## 2024-09-29 RX ADMIN — OXYCODONE HYDROCHLORIDE AND ACETAMINOPHEN 1 TABLET: 5; 325 TABLET ORAL at 01:25

## 2024-09-29 ASSESSMENT — PAIN DESCRIPTION - DESCRIPTORS
DESCRIPTORS: ACHING
DESCRIPTORS: ACHING;SHARP;STABBING
DESCRIPTORS: DISCOMFORT

## 2024-09-29 ASSESSMENT — PAIN SCALES - GENERAL
PAINLEVEL_OUTOF10: 9
PAINLEVEL_OUTOF10: 0
PAINLEVEL_OUTOF10: 2
PAINLEVEL_OUTOF10: 8

## 2024-09-29 ASSESSMENT — PAIN DESCRIPTION - ORIENTATION: ORIENTATION: MID

## 2024-09-29 ASSESSMENT — PAIN DESCRIPTION - LOCATION
LOCATION: ABDOMEN

## 2024-09-29 NOTE — CARE COORDINATION
Discharge planning    Per SS patient has auth for [facility] with  at 1130    Call to [facility] and confirmed  time.     IMM second letter given

## 2024-09-29 NOTE — PROGRESS NOTES
Nicolas Gao MD   Urology Progress Note            Subjective:  follow-up urethral stricture difficult catheterization    Patient Vitals for the past 24 hrs:   BP Temp Temp src Pulse Resp SpO2   09/18/24 0451 138/68 97.5 °F (36.4 °C) Oral 73 17 100 %   09/18/24 0142 -- -- -- -- 18 --   09/17/24 2337 -- -- -- -- 18 --   09/17/24 2251 (!) 164/70 98.1 °F (36.7 °C) Oral 96 18 97 %   09/17/24 2021 139/65 98.1 °F (36.7 °C) Oral 82 20 97 %   09/17/24 1950 -- -- -- -- 20 --   09/17/24 1920 139/62 -- -- -- -- --   09/17/24 1530 (!) 143/71 98 °F (36.7 °C) Oral 85 20 96 %   09/17/24 1150 (!) 147/67 97.5 °F (36.4 °C) Axillary 70 -- 97 %   09/17/24 1042 134/63 98.5 °F (36.9 °C) Oral 72 22 96 %   09/17/24 0922 -- -- -- 83 (!) 32 --   09/17/24 0900 -- -- -- 75 18 --   09/17/24 0801 -- 98.4 °F (36.9 °C) Oral -- -- --   09/17/24 0800 129/74 98.4 °F (36.9 °C) Oral 67 23 --   09/17/24 0700 -- -- -- 71 19 --       Intake/Output Summary (Last 24 hours) at 9/18/2024 0520  Last data filed at 9/18/2024 0117  Gross per 24 hour   Intake 1014.42 ml   Output 1075 ml   Net -60.58 ml       Recent Labs     09/15/24  0654   WBC 11.6*   HGB 9.8*   HCT 31.9*   MCV 93.8   *     Recent Labs     09/15/24  0654 09/16/24  0738    140   K 4.2 4.4   * 110*   CO2 19* 22   PHOS 3.5  --    BUN 21 20   CREATININE 1.1 1.0       No results for input(s): \"COLORU\", \"PHUR\", \"LABCAST\", \"WBCUA\", \"RBCUA\", \"MUCUS\", \"TRICHOMONAS\", \"YEAST\", \"BACTERIA\", \"CLARITYU\", \"SPECGRAV\", \"LEUKOCYTESUR\", \"UROBILINOGEN\", \"BILIRUBINUR\", \"BLOODU\" in the last 72 hours.    Invalid input(s): \"NITRATE\", \"GLUCOSEUKETONESUAMORPHOUS\"    Additional Lab/culture results:    Physical Exam:  patient he would urethral dilatation yesterday, he has a severe stricture at the meatus was dilated to  size 14 and we are able to insert a size 12 catheter   Catheter has been draining bladder decompressed  Renal function at baseline    Interval Imaging 
                                Nicolas Gao MD   Urology Progress Note            Subjective:  follow-up urinary retention, urethral stricture    Patient Vitals for the past 24 hrs:   BP Temp Temp src Pulse Resp SpO2 Weight   09/24/24 1600 121/63 97.7 °F (36.5 °C) Oral 87 18 94 % --   09/24/24 1340 -- -- -- -- 18 -- --   09/24/24 1309 -- -- -- -- 18 -- --   09/24/24 1221 138/76 98.1 °F (36.7 °C) Oral 94 -- 94 % --   09/24/24 0636 -- -- -- -- -- -- 61.8 kg (136 lb 3.2 oz)   09/24/24 0400 118/72 98.3 °F (36.8 °C) Oral 76 20 96 % --   09/23/24 2034 128/68 -- -- -- -- -- --   09/23/24 2022 -- 98.2 °F (36.8 °C) Oral 81 20 97 % --       Intake/Output Summary (Last 24 hours) at 9/24/2024 1619  Last data filed at 9/24/2024 1552  Gross per 24 hour   Intake 2170 ml   Output 1950 ml   Net 220 ml       Recent Labs     09/22/24  0631 09/23/24  0508 09/24/24  0623   WBC 13.5* 13.0* 13.9*   HGB 9.5* 9.3* 9.4*   HCT 31.6* 30.8* 30.7*   MCV 97.2 96.3 93.6   * 413 440     Recent Labs     09/22/24  0631 09/23/24  0508 09/24/24  0623    141 138   K 5.1 4.4 4.4    109* 106   CO2 22 22 23   BUN 32* 32* 31*   CREATININE 1.2 1.1 1.3*       No results for input(s): \"COLORU\", \"PHUR\", \"LABCAST\", \"WBCUA\", \"RBCUA\", \"MUCUS\", \"TRICHOMONAS\", \"YEAST\", \"BACTERIA\", \"CLARITYU\", \"SPECGRAV\", \"LEUKOCYTESUR\", \"UROBILINOGEN\", \"BILIRUBINUR\", \"BLOODU\" in the last 72 hours.    Invalid input(s): \"NITRATE\", \"GLUCOSEUKETONESUAMORPHOUS\"    Additional Lab/culture results:    Physical Exam:  patient alert not in acute distress    Catheter in place bladder decompressed, discussed catheter removal and void trial  Interval Imaging Findings:    Impression:    Patient Active Problem List   Diagnosis    Type 2 diabetes mellitus with diabetic polyneuropathy, with long-term current use of insulin (HCC)    Neuropathic pain, leg    Diabetic polyneuropathy (HCC)    Allergic rhinitis    Tobacco dependence    Edentulous    Dysphagia    Lung nodules    
                                Nicolas Gao MD   Urology Progress Note            Subjective:  llow-up urinary retention, gross hematuria    Patient Vitals for the past 24 hrs:   BP Temp Temp src Pulse Resp SpO2   09/26/24 0319 -- -- -- -- 18 --   09/25/24 2310 (!) 141/67 -- -- -- -- --   09/25/24 2215 (!) 163/71 -- -- 98 -- --   09/25/24 2212 -- -- -- -- 18 --   09/25/24 1951 116/62 98 °F (36.7 °C) Oral 100 17 94 %   09/25/24 1818 (!) 142/70 98.8 °F (37.1 °C) Oral (!) 103 18 97 %   09/25/24 1753 -- -- -- -- 18 --   09/25/24 1723 -- -- -- -- 18 --   09/25/24 1245 (!) 148/78 -- -- -- 16 --   09/25/24 1157 (!) 163/74 98.6 °F (37 °C) Oral 94 16 96 %   09/25/24 0801 120/61 98.1 °F (36.7 °C) Oral 88 14 96 %       Intake/Output Summary (Last 24 hours) at 9/26/2024 0546  Last data filed at 9/25/2024 1923  Gross per 24 hour   Intake 1559.12 ml   Output 1350 ml   Net 209.12 ml       Recent Labs     09/24/24  0623 09/25/24  0504   WBC 13.9* 12.7*   HGB 9.4* 8.8*   HCT 30.7* 28.7*   MCV 93.6 94.4    392     Recent Labs     09/24/24  0623 09/25/24  0504    140   K 4.4 4.2    108*   CO2 23 21   BUN 31* 35*   CREATININE 1.3* 1.2       No results for input(s): \"COLORU\", \"PHUR\", \"LABCAST\", \"WBCUA\", \"RBCUA\", \"MUCUS\", \"TRICHOMONAS\", \"YEAST\", \"BACTERIA\", \"CLARITYU\", \"SPECGRAV\", \"LEUKOCYTESUR\", \"UROBILINOGEN\", \"BILIRUBINUR\", \"BLOODU\" in the last 72 hours.    Invalid input(s): \"NITRATE\", \"GLUCOSEUKETONESUAMORPHOUS\"    Additional Lab/culture results:    Physical Exam:  patient continues with the gross hematuria, at the present time we will postpone catheter removal to avoid the risk of clot retention irrigate Quesada p.r.n.    Interval Imaging Findings:    Impression:    Patient Active Problem List   Diagnosis    Type 2 diabetes mellitus with diabetic polyneuropathy, with long-term current use of insulin (HCC)    Neuropathic pain, leg    Diabetic polyneuropathy (HCC)    Allergic rhinitis    Tobacco dependence    
                                Nicolas Gao MD   Urology Progress Note            Subjective: Follow-up difficulty upon urination  High postvoid residual  Concerns about urinary retention    Patient Vitals for the past 24 hrs:   BP Temp Temp src Pulse Resp SpO2   09/16/24 0543 -- -- -- 64 22 97 %   09/16/24 0314 -- -- -- -- 20 --   09/15/24 2351 -- -- -- -- 21 --   09/15/24 2326 -- 98.3 °F (36.8 °C) -- -- -- --   09/15/24 2321 -- -- -- -- 23 --   09/15/24 2134 -- -- -- -- 18 --   09/15/24 2103 -- -- -- -- 18 --   09/15/24 1930 -- -- -- (!) 107 27 --   09/15/24 1900 124/71 -- -- 97 17 --   09/15/24 1830 -- -- -- 95 25 --   09/15/24 1800 (!) 146/87 -- -- 81 (!) 33 --   09/15/24 1730 -- -- -- 80 17 --   09/15/24 1700 (!) 165/69 -- -- 82 19 97 %   09/15/24 1630 (!) 146/65 -- -- 83 20 --   09/15/24 1619 -- -- -- -- -- 96 %   09/15/24 1607 -- -- -- -- 23 --   09/15/24 1600 -- 97.7 °F (36.5 °C) Oral 84 18 --   09/15/24 1530 -- -- -- 88 (!) 32 --   09/15/24 1500 -- -- -- 90 30 --   09/15/24 1430 -- -- -- (!) 102 (!) 33 --   09/15/24 1400 -- -- -- 95 (!) 33 95 %   09/15/24 1330 -- -- -- 77 22 --   09/15/24 1317 -- -- -- 83 26 96 %   09/15/24 1300 (!) 150/65 97.8 °F (36.6 °C) Oral 87 25 96 %   09/15/24 1230 -- -- -- 73 18 97 %   09/15/24 1200 (!) 127/53 -- -- 68 21 95 %   09/15/24 1130 -- -- -- 70 22 92 %   09/15/24 1100 (!) 129/50 -- -- 76 22 95 %   09/15/24 1036 -- -- -- -- 27 --   09/15/24 1030 -- -- -- 87 26 --   09/15/24 1027 -- -- -- -- -- 94 %   09/15/24 1000 (!) 125/55 -- -- 82 24 --   09/15/24 0930 -- -- -- 94 (!) 35 --   09/15/24 0900 138/71 -- -- 86 21 --   09/15/24 0840 -- -- -- 89 15 --   09/15/24 0830 -- -- -- 88 20 --   09/15/24 0804 -- -- -- -- 27 --   09/15/24 0800 (!) 155/64 98 °F (36.7 °C) Oral 78 25 90 %   09/15/24 0730 -- -- -- 69 21 97 %   09/15/24 0700 (!) 155/68 -- -- 80 16 (!) 84 %       Intake/Output Summary (Last 24 hours) at 9/16/2024 0644  Last data filed at 9/15/2024 2326  Gross per 24 hour 
                                Nicolas Gao MD   Urology Progress Note            Subjective: Follow-up urinary retention    Patient Vitals for the past 24 hrs:   BP Temp Temp src Pulse Resp SpO2 Weight   09/19/24 0454 122/76 97.3 °F (36.3 °C) Oral 61 16 100 % 62.1 kg (136 lb 12.8 oz)   09/19/24 0121 -- -- -- -- 16 -- --   09/18/24 2359 106/79 98.2 °F (36.8 °C) Oral 74 16 98 % --   09/18/24 2123 -- -- -- -- 16 -- --   09/18/24 2007 (!) 131/54 98.1 °F (36.7 °C) Oral 79 16 96 % --   09/18/24 1539 134/67 98.8 °F (37.1 °C) Oral 78 16 94 % --   09/18/24 1206 135/62 97.4 °F (36.3 °C) Axillary 64 16 98 % --   09/18/24 1059 126/61 98 °F (36.7 °C) Oral 74 15 97 % --   09/18/24 0809 (!) 151/65 97.9 °F (36.6 °C) Oral 76 16 97 % --       Intake/Output Summary (Last 24 hours) at 9/19/2024 0705  Last data filed at 9/18/2024 1952  Gross per 24 hour   Intake 986.57 ml   Output 1675 ml   Net -688.43 ml       No results for input(s): \"WBC\", \"HGB\", \"HCT\", \"MCV\", \"PLT\" in the last 72 hours.  Recent Labs     09/16/24  0738 09/18/24  0507 09/19/24  0535    140 139   K 4.4 4.7 4.9   * 110* 109*   CO2 22 21 21   BUN 20 25* 27*   CREATININE 1.0 1.0 1.0       No results for input(s): \"COLORU\", \"PHUR\", \"LABCAST\", \"WBCUA\", \"RBCUA\", \"MUCUS\", \"TRICHOMONAS\", \"YEAST\", \"BACTERIA\", \"CLARITYU\", \"SPECGRAV\", \"LEUKOCYTESUR\", \"UROBILINOGEN\", \"BILIRUBINUR\", \"BLOODU\" in the last 72 hours.    Invalid input(s): \"NITRATE\", \"GLUCOSEUKETONESUAMORPHOUS\"    Additional Lab/culture results:    Physical Exam: Patient alert oriented not in acute distress tolerating catheter without any problems she is complaining of abdominal pain but no bladder spasms and no evidence of bladder distention urine still dark tea colored cystoscopy would be beneficial    Interval Imaging Findings:    Impression:    Patient Active Problem List   Diagnosis    Type 2 diabetes mellitus with diabetic polyneuropathy, with long-term current use of insulin (HCC)    Neuropathic 
         DAILY PROGRESS NOTE      Patient:   Nicolas Cardenas   :    1955   Date:     2024  Consultant:   Patricia Mcrae CNP    Subjective:   Patient seen and examined.   Repeat stool for c-diff AG+ toxin negative.   He is on Dificid, last day for this is Wednesday.   No stool thus far today, still c/o tenesmus, wants more food more fluids.     Current Medications include:   Scheduled Meds:   oxyBUTYnin  5 mg Oral Nightly    Vitamin D  2,000 Units Oral Daily    QUEtiapine  50 mg Oral Daily    tamsulosin  0.4 mg Oral BID    Fidaxomicin  200 mg Oral BID    sodium chloride flush  5-40 mL IntraVENous 2 times per day    insulin glargine  18 Units SubCUTAneous Daily    insulin lispro  0-4 Units SubCUTAneous TID WC    insulin lispro  0-4 Units SubCUTAneous Nightly    cholestyramine light  4 g Oral Daily    warfarin placeholder: dosing by pharmacy   Other RX Placeholder    guaiFENesin  600 mg Oral BID    sodium bicarbonate  1,300 mg Oral BID    lipase-protease-amylase  20,000 Units Oral TID WC    And    lipase-protease-amylas  5,000 Units Oral TID WC     Continuous Infusions:   sodium chloride 20 mL/hr at 24 0353    sodium chloride 5 mL/hr at 24 1826    dextrose       PRN Meds:.oxyCODONE-acetaminophen **OR** [DISCONTINUED] oxyCODONE-acetaminophen, lidocaine, diphenhydrAMINE, hyoscyamine, zolpidem, diphenhydrAMINE, sodium chloride flush, sodium chloride, potassium chloride **OR** potassium alternative oral replacement **OR** potassium chloride, magnesium sulfate, acetaminophen **OR** acetaminophen, glucose, dextrose bolus **OR** dextrose bolus, glucagon (rDNA), dextrose, melatonin, ipratropium 0.5 mg-albuterol 2.5 mg, ondansetron **OR** ondansetron    Allergies:   Allergies   Allergen Reactions    Ativan [Lorazepam] Anaphylaxis    Gabapentin Other (See Comments)     dizziness    Other        Objective:   Vital Signs:  Temp (24hrs), Av.9 °F (36.6 °C), Min:97.9 °F (36.6 °C), Max:98 °F (36.7 
         [facility]     DAILY PROGRESS NOTE      Patient:   Nicolas Cardenas   :    1955   Date:     2024  Consultant:   Ismael Stallworth DO FACG    Subjective:     68 y.o. male admitted 2024 with Urinary retention [R33.9]  Hyponatremia [E87.1]  LUMA (acute kidney injury) (HCC) [N17.9]  Sepsis (HCC) [A41.9]  Urinary tract infection without hematuria, site unspecified [N39.0]  Unable to care for self [Z78.9] and seen for C. difficile.  No bowel movement today per RN.  Patient agrees.  Patient continues to complain of tenesmus.  He is asking for pain medication.    Current Medications include:   Scheduled Meds:   warfarin  7.5 mg Oral Once    Vitamin D  2,000 Units Oral Daily    QUEtiapine  50 mg Oral Daily    tamsulosin  0.4 mg Oral BID    Fidaxomicin  200 mg Oral BID    sodium chloride flush  5-40 mL IntraVENous 2 times per day    insulin glargine  18 Units SubCUTAneous Daily    insulin lispro  0-4 Units SubCUTAneous TID WC    insulin lispro  0-4 Units SubCUTAneous Nightly    cholestyramine light  4 g Oral Daily    warfarin placeholder: dosing by pharmacy   Other RX Placeholder    guaiFENesin  600 mg Oral BID    sodium bicarbonate  1,300 mg Oral BID    lipase-protease-amylase  20,000 Units Oral TID WC    And    lipase-protease-amylas  5,000 Units Oral TID WC     Continuous Infusions:   sodium chloride 20 mL/hr at 24 1243    sodium chloride 5 mL/hr at 24 1826    dextrose       PRN Meds:.oxyCODONE-acetaminophen **OR** [DISCONTINUED] oxyCODONE-acetaminophen, lidocaine, diphenhydrAMINE, hyoscyamine, zolpidem, diphenhydrAMINE, sodium chloride flush, sodium chloride, potassium chloride **OR** potassium alternative oral replacement **OR** potassium chloride, magnesium sulfate, acetaminophen **OR** acetaminophen, glucose, dextrose bolus **OR** dextrose bolus, glucagon (rDNA), dextrose, melatonin, ipratropium 0.5 mg-albuterol 2.5 mg, ondansetron **OR** 
       GASTROENTEROLOGY NOTE       Patient:   Nicolas Cardenas   :    1955   Facility:   [facility]  Date:     9/15/2024  Consultant:   Neil Hewitt MD, DO      SUBJECTIVE:    68 y.o. male admitted 2024 with Urinary retention [R33.9]  Hyponatremia [E87.1]  LUMA (acute kidney injury) (HCC) [N17.9]  Sepsis (HCC) [A41.9]  Urinary tract infection without hematuria, site unspecified [N39.0]  Unable to care for self [Z78.9].      Patient just had a loose/liquid brown stool before coming into room.   Ongoing abdominal crampy pain.  Tolerating diet better now.  He feels like he cannot eliminate completely with tenesmus and straining.          OBJECTIVE:   Vital Signs:  BP (!) 148/58   Pulse 73   Temp 98.7 °F (37.1 °C) (Oral)   Resp 27   Ht 1.854 m (6' 1\")   Wt 54.7 kg (120 lb 9.5 oz)   SpO2 96%   BMI 15.91 kg/m²      Physical Exam:   General appearance: Alert, NAD  Abdomen: Soft, NT, ND +BS, no masses  Skin/Musculoskeletal:  No jaundice. No clubbing, cyanosis, or edema.  ROM normal.      Lab and Imaging Review   Recent Labs     24  1740 24  2141 24  0247 24  0919 09/15/24  0654   WBC 20.4*  --  16.5* 12.1* 11.6*   HGB 10.7*  --  9.1* 9.1* 9.8*   MCV 94.0  --  96.2 95.3 93.8   *  --  684* 686* 664*   INR  --    < > 1.8 1.7 1.7   *  --  135 140 142   K 4.9  --  4.3 3.9 4.2     --  108* 110* 111*   CO2 14*  --  14* 17* 19*   BUN 30*  --  27* 20 21   CREATININE 1.6*  --  1.5* 1.1 1.1   GLUCOSE 178*  --  178* 151* 113*   CALCIUM 9.4  --  8.6 9.3 9.2   AST 10  --   --   --   --    ALT 8  --   --   --   --    ALKPHOS 159*  --   --   --   --    BILITOT 0.1*  --   --   --   --    LIPASE 52  --   --   --   --    MG  --   --   --   --  2.0    < > = values in this interval not displayed.     Recent Labs     24  0247 24  0919 09/15/24  0654   INR 1.8 1.7 1.7   PROTIME 21.1* 19.7* 20.3*         Impression:    Chronic diarrhea.  Recurrent C.diff.  FMT last 
       GASTROENTEROLOGY NOTE       Patient:   Nicolas Cardenas   :    1955   [facility]  Date:     2024  Consultant:   DAVID CULVER      SUBJECTIVE:    Patient seen and examined.   Main complaint currently is back pain, more drowsy today than yesterday, still with loose stools.   Poor sleep, RN arguing with nursing staff  Continues to request enema's, feels like it does not come out  No hematochezia, no lower abdominal pain.   Has phantom pains of absent BLE  No vomiting today, PO intake bit better.   OBJECTIVE:   Vital Signs:  /63   Pulse 72   Temp 98.5 °F (36.9 °C) (Oral)   Resp 22   Ht 1.854 m (6' 1\")   Wt 54.7 kg (120 lb 9.5 oz)   SpO2 96%   BMI 15.91 kg/m²      Physical Exam:   General appearance: Alert, NAD, chronically ill appearing  Abdomen: Soft, non tender present bs  Skin/Musculoskeletal:  No jaundice. No clubbing, B BKA       Lab and Imaging Review   Recent Labs     09/15/24  0654 24  0738 24  0435   WBC 11.6*  --   --    HGB 9.8*  --   --    MCV 93.8  --   --    *  --   --    INR 1.7 2.0 2.0    140  --    K 4.2 4.4  --    * 110*  --    CO2 19* 22  --    BUN 21 20  --    CREATININE 1.1 1.0  --    GLUCOSE 113* 105*  --    CALCIUM 9.2 8.8  --    MG 2.0 2.0  --      Recent Labs     09/15/24  0654 24  0738 24  0435   INR 1.7 2.0 2.0   PROTIME 20.3* 22.7* 22.4*         Impression:    Chronic diarrhea.  Recurrent C.diff.  FMT last year.  Positive again .  ID following.  Vanco started this admission and changed to Dificid  per ID.   Still main complain is tenesmus, clinical exam does not suggest toxic michelle colon.     PLAN:    Continue Dificid per infectious disease service  Limit narcotics given tenesmus along with active C.diff to prevent toxic megacolon.   No plans for repeat lower endoscopy a this time.   Following.       Patricia Mcrae CNP  Discussed with Dr. Stallworth.            
       GASTROENTEROLOGY NOTE       Patient:   Nicolas Cardenas   :    1955   [facility]   [facility]  Date:     2024  Consultant:   MARCELO Nunez CNP      SUBJECTIVE:    Patient feeling better today.  On the phone with his mother, distracted by that conversation.  Patient complains of being hungry, telling me that he is not being fed enough food.  Requesting larger portions.  Continues to have diarrhea.  Denies nausea, vomiting, or abdominal pain.  Passing gas.        OBJECTIVE:   Vital Signs:  BP (!) 147/74   Pulse 67   Temp 97.7 °F (36.5 °C) (Oral)   Resp 16   Ht 1.854 m (6' 1\")   Wt 62.1 kg (136 lb 12.8 oz)   SpO2 100%   BMI 18.05 kg/m²      Physical Exam:   General appearance: Alert, NAD  Lungs: CTA bilaterally, unlabored pattern  Heart: S1S2, RRR  Abdomen: Soft, NT, ND +BS  Skin/Musculoskeletal:  No jaundice. Bilateral BKA    Lab and Imaging Review   Recent Labs     24  0435 24  0507 24  0535   INR 2.0 1.5 1.5   NA  --  140 139   K  --  4.7 4.9   CL  --  110* 109*   CO2  --  21 21   BUN  --  25* 27*   CREATININE  --  1.0 1.0   GLUCOSE  --  137* 104*   CALCIUM  --  8.4* 8.3*     Recent Labs     24  0435 24  0507 24  0535   INR 2.0 1.5 1.5   PROTIME 22.4* 18.1* 18.1*         Impression:    Chronic diarrhea  Recurrent C. Diff, currently on Dificid      PLAN:    Continue Dificid per ID recommendations  Limit narcotics in the setting of C. Diff  Diet as tolerated  Following      Rosalba Perez CNP    Discussed with nurse practitioner.  Patient seen and examined.  I agree with the villela portions of the above with modifications as noted below.     I ordered B & O suppository for tenesmus yesterday, however not available per pharmacy.  Fortunately, it does not sound like he is having a lot of additional anal spasm    He has chronic abdominal pain and at home usually has 10-15 stools.  This has been an ongoing issue for the last 5 years.  At 
       GASTROENTEROLOGY NOTE       Patient:   Nicolas Cardenas   :    1955   Facility:   [facility]  Date:     2024  Consultant:   DAVID CULVER      SUBJECTIVE:    Patient has had 4 loose stools today, c/o suprapubic pain, retention of 500cc's urine, getting Dailey, some vomiting this am, this is new for him.       Still feels tenesmus and straining.  OBJECTIVE:   Vital Signs:  /71   Pulse 64   Temp 98.1 °F (36.7 °C) (Axillary)   Resp 26   Ht 1.854 m (6' 1\")   Wt 54.7 kg (120 lb 9.5 oz)   SpO2 97%   BMI 15.91 kg/m²      Physical Exam:   General appearance: Alert, NAD, chronically ill appearing  Abdomen: Soft, ttp suprapubic, present bs  Skin/Musculoskeletal:  No jaundice. No clubbing, cyanosis, or edema.  ROM normal.      Lab and Imaging Review   Recent Labs     09/14/24  0919 09/15/24  0654 24  0738   WBC 12.1* 11.6*  --    HGB 9.1* 9.8*  --    MCV 95.3 93.8  --    * 664*  --    INR 1.7 1.7 2.0    142 140   K 3.9 4.2 4.4   * 111* 110*   CO2 17* 19* 22   BUN 20 21 20   CREATININE 1.1 1.1 1.0   GLUCOSE 151* 113* 105*   CALCIUM 9.3 9.2 8.8   MG  --  2.0 2.0     Recent Labs     09/14/24  0919 09/15/24  0654 24  0738   INR 1.7 1.7 2.0   PROTIME 19.7* 20.3* 22.7*         Impression:    Chronic diarrhea.  Recurrent C.diff.  FMT last year.  Positive again .  ID following.  Vanco started this admission and changed to Dificid yesterday. Has had 4 loose stools today.   New today with nausea, vomiting, urinary retention, did agree to dailey.    PLAN:    Continue Dificid per infectious disease service  Limit narcotics given tenesmus along with active C.diff to prevent toxic megacolon.   Check KUB   Following.       Patricia Mcrae CNP  Discussed with Dr. Stallworth.            
  [facility]  Progress Note    Nicolas Cardenas  MRN: [id number]  Date: 9/27/2024  LOS: 15     Reason for F/U :   UTI, C. difficile infection    Impression :   C. 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  Hematuria 9/26/2024  Doubt urinary tract infection  Chronic kidney disease stage IIIa  Diabetes mellitus type 2  Chronic venous insufficiency status post bilateral BKA  Chronic pancreatitis    Recommendations:   The patient completed a 10-day course of fidaxomicin  9/15/2024 through 9/25/2024   The patient will be started on an oral vancomycin taper  Urology has been consulted for the hematuria and there is consideration for cystoscopy  Plans are for discharge to a skilled nursing facility  The patient can be removed out of isolation once he is no longer having diarrhea    Infection Control Recommendations:   Contact precaution    Discharge Planning:   Patient will need Midline Catheter Insertion/ PICC line Insertion: No  Patient will need: Home IV , Infusion Center,  SNF,  LTAC: Undetermined  Patient willneed outpatient wound care: No    Medical Decision making / Summary of Stay:   Nicolas Cardenas is a 68 y.o.-year-old male who was initially admitted on 9/12/2024.       Mo has multiple medical issues including diabetes mellitus type 2, hypertension, COPD, nephrolithiasis, CKD, liver disease, pulmonary realism and esophageal cancer.  Patient has had multiple issues in the past with his lower extremities and subsequently underwent bilateral below the knee amputations.  He has also had chronic diarrheal issues and has been treated for C. difficile in the past as well as yersiniosis.  Previously treated with vancomycin and FMT.     Mo has been seen by our service in the past, most recently in February 2024 where he was seen due to concern for stump wound infections.  Patient did not have any signs of an acute infectious process at that time.     Patient was also 
  [facility]  Progress Note    Nicolas Cardenas  MRN: [id number]  Date: 9/25/2024  LOS: 13     Reason for F/U :   UTI, c difficile    Impression :   C. 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  Doubt urinary tract infection  Chronic kidney disease stage IIIa  Diabetes mellitus type 2  Chronic venous insufficiency, s/p bilateral BKA  Chronic pancreatitis    Recommendations:   The patient continues on fidaxomicin, through 9/25/2024, today to complete a 10 day course  The patient will then be placed on a vancomycin taper  Plans are for discharge to his nursing facility  Once the patient is no longer having diarrhea he can be removed out of isolation  Continue supportive care    Infection Control Recommendations:   Contact precautions    Discharge Planning:     Patient will need Midline Catheter Insertion/ PICC line Insertion: No  Patient will need: Home IV , Infusion Center,  SNF,  LTAC: Undetermined  Patient willneed outpatient wound care: No    Medical Decision making / Summary of Stay:   Nicolas Cardenas is a 68 y.o.-year-old male who was initially admitted on 9/12/2024.       Mo has multiple medical issues including diabetes mellitus type 2, hypertension, COPD, nephrolithiasis, CKD, liver disease, pulmonary realism and esophageal cancer.  Patient has had multiple issues in the past with his lower extremities and subsequently underwent bilateral below the knee amputations.  He has also had chronic diarrheal issues and has been treated for C. difficile in the past as well as yersiniosis.  Previously treated with vancomycin and FMT.     Mo has been seen by our service in the past, most recently in February 2024 where he was seen due to concern for stump wound infections.  Patient did not have any signs of an acute infectious process at that time.     Patient was also recently seen by [facility] infectious disease service and was diagnosed with a right obstructing ureteral 
  [facility]  Progress Note    Nicolas Cardenas  MRN: [id number]  Date: 9/21/2024  LOS: 9     Reason for F/U :   UTI, C. difficile infection    Impression :   C. 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  Doubt urinary tract infection  Chronic kidney disease stage IIIa  Diabetes mellitus type 2  Chronic venous insufficiency status post bilateral BKA  Chronic pancreatitis    Recommendations:   The patient continues on fidaxomicin since 9/15/2024   The plan is to continue this to complete a 10-day course of therapy on 9/25/2024  The patient will subsequently then take a vancomycin/Xifaxan taper  The patient wants to be reconsidered for repeat fecal transplant  Plans are for discharge to a skilled nursing facility  The stools are overall improved    Infection Control Recommendations:   Contact precaution    Discharge Planning:   Patient will need Midline Catheter Insertion/ PICC line Insertion: No  Patient will need: Home IV , Infusion Center,  SNF,  LTAC: Undetermined  Patient willneed outpatient wound care: No    Medical Decision making / Summary of Stay:   Nicolas Cardenas is a 68 y.o.-year-old male who was initially admitted on 9/12/2024.       Mo has multiple medical issues including diabetes mellitus type 2, hypertension, COPD, nephrolithiasis, CKD, liver disease, pulmonary realism and esophageal cancer.  Patient has had multiple issues in the past with his lower extremities and subsequently underwent bilateral below the knee amputations.  He has also had chronic diarrheal issues and has been treated for C. difficile in the past as well as yersiniosis.  Previously treated with vancomycin and FMT.     Mo has been seen by our service in the past, most recently in February 2024 where he was seen due to concern for stump wound infections.  Patient did not have any signs of an acute infectious process at that time.     Patient was also recently seen by [facility] infectious 
  [facility]  Progress Note    Nicolas Cardenas  MRN: [id number]  Date: 9/20/2024  LOS: 8     Reason for F/U :   UTI, C. difficile infection    Impression :   C. 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  Doubt urinary tract infection  Chronic kidney disease stage IIIa  Diabetes mellitus type 2  Chronic venous insufficiency status post bilateral BKA  Chronic pancreatitis    Recommendations:   The patient continues on fidaxomicin since 9/15/2024   The plan is to continue this to complete a 10-day course of therapy on 9/25/2024  The patient will subsequently then take a vancomycin/Xifaxan taper  The patient wants to be reconsidered for repeat fecal transplant  Plans are for discharge to a skilled nursing facility    Infection Control Recommendations:   Contact precaution    Discharge Planning:   Patient will need Midline Catheter Insertion/ PICC line Insertion: No  Patient will need: Home IV , Infusion Center,  SNF,  LTAC: Undetermined  Patient willneed outpatient wound care: No    Medical Decision making / Summary of Stay:   Nicolas Cardenas is a 68 y.o.-year-old male who was initially admitted on 9/12/2024.       Mo has multiple medical issues including diabetes mellitus type 2, hypertension, COPD, nephrolithiasis, CKD, liver disease, pulmonary realism and esophageal cancer.  Patient has had multiple issues in the past with his lower extremities and subsequently underwent bilateral below the knee amputations.  He has also had chronic diarrheal issues and has been treated for C. difficile in the past as well as yersiniosis.  Previously treated with vancomycin and FMT.     Mo has been seen by our service in the past, most recently in February 2024 where he was seen due to concern for stump wound infections.  Patient did not have any signs of an acute infectious process at that time.     Patient was also recently seen by [facility] infectious disease service and was diagnosed 
  [facility]  Progress Note    Nicolas Cardenas  MRN: [id number]  Date: 9/28/2024  LOS: 16     Reason for F/U :   UTI, C. difficile infection    Impression :   C. 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  Hematuria 9/26/2024 /ureteral stone status post CYSTOSCOPY , DIALATION , CYSTOLITHOLAPEXY on 9/27/2024  Doubt urinary tract infection  Chronic kidney disease stage IIIa  Diabetes mellitus type 2  Chronic venous insufficiency status post bilateral BKA  Chronic pancreatitis    Recommendations:   The patient completed a 10-day course of fidaxomicin  9/15/2024 through 9/25/2024   Follow urine and stool culture from 9/27/2024  No growth on blood cultures  Continue Oral vancomycin taper  Continue supportive care    Infection Control Recommendations:   Contact precaution    Discharge Planning:   Patient will need Midline Catheter Insertion/ PICC line Insertion: No  Patient will need: Home IV , Infusion Center,  SNF,  LTAC: Undetermined  Patient willneed outpatient wound care: No    Medical Decision making / Summary of Stay:   Nicolas Cardenas is a 68 y.o.-year-old male who was initially admitted on 9/12/2024.       Mo has multiple medical issues including diabetes mellitus type 2, hypertension, COPD, nephrolithiasis, CKD, liver disease, pulmonary realism and esophageal cancer.  Patient has had multiple issues in the past with his lower extremities and subsequently underwent bilateral below the knee amputations.  He has also had chronic diarrheal issues and has been treated for C. difficile in the past as well as yersiniosis.  Previously treated with vancomycin and FMT.     Mo has been seen by our service in the past, most recently in February 2024 where he was seen due to concern for stump wound infections.  Patient did not have any signs of an acute infectious process at that time.     Patient was also recently seen by [facility] infectious disease service and was diagnosed with 
  [facility]  Progress Note    Nicolas Cardenas  MRN: [id number]  Date: 9/14/2024  LOS: 2     Reason for F/U :   UTI, C. difficile infection    Impression :   Dysuria with concern for urinary tract infection  Diarrhea in a patient with a history of C. difficile infection with complaints of constipation  Chronic kidney disease stage IIIa  Diabetes mellitus type 2  Chronic venous insufficiency status post bilateral BKA  Chronic pancreatitis    Recommendations:   The patient is on oral vancomycin for presumed C. Difficile  The patient is on cholestyramine as well  I will stop the systemic antimicrobial therapy with cefepime for the  Stool studies are still in progress    Infection Control Recommendations:   Contact precaution    Discharge Planning:   Patient will need Midline Catheter Insertion/ PICC line Insertion: No  Patient will need: Home IV , Infusion Center,  SNF,  LTAC: Undetermined  Patient willneed outpatient wound care: No    Medical Decision making / Summary of Stay:   Nicolas Cardenas is a 68 y.o.-year-old male who was initially admitted on 9/12/2024.       Mo has multiple medical issues including diabetes mellitus type 2, hypertension, COPD, nephrolithiasis, CKD, liver disease, pulmonary realism and esophageal cancer.  Patient has had multiple issues in the past with his lower extremities and subsequently underwent bilateral below the knee amputations.  He has also had chronic diarrheal issues and has been treated for C. difficile in the past as well as yersiniosis.  Previously treated with vancomycin and FMT.     Mo has been seen by our service in the past, most recently in February 2024 where he was seen due to concern for stump wound infections.  Patient did not have any signs of an acute infectious process at that time.     Patient was also recently seen by [facility] infectious disease service and was diagnosed with a right obstructing ureteral stone with possible  
Comprehensive Nutrition Assessment    Type and Reason for Visit:  Initial, RD Nutrition Re-Screen/LOS    Nutrition Recommendations/Plan:   Will change diet to 5 carbs to offer additional food.  Will send Magic Cup daily  Patient eating well but he is underweight with diarrhea and may require more nutrition.   RD to continue to follow.        Malnutrition Assessment:  Malnutrition Status:  Moderate malnutrition (09/19/24 1259)    Context:  Chronic Illness     Findings of the 6 clinical characteristics of malnutrition:  Energy Intake:  No significant decrease in energy intake  Weight Loss:  No significant weight loss     Body Fat Loss:  Severe body fat loss Triceps   Muscle Mass Loss:  Severe muscle mass loss Temples (temporalis), Hand (interosseous), Clavicles (pectoralis & deltoids), Calf (gastrocnemius)  Fluid Accumulation:  No significant fluid accumulation     Strength:       Nutrition Assessment:    Patient here with chronic diarrhea, cdiff infection. History of cdiff and fecal transplant. Patient eats very well, has been eating all the snacks in nursing station, complains of not getting enough food. He is on a CHO4 diet with GI bland restrictions. Weight is stable, his weight is up from previous encounters as well as from admission. CM working on placement. Labs, meds, PMH reviewed.    Nutrition Related Findings:    Skin intact, no edema. Chronic diarrhea, LBM 9/18. BKA. Wound Type: None       Current Nutrition Intake & Therapies:    Average Meal Intake: %     ADULT DIET; Regular; 4 carb choices (60 gm/meal); GI [place] (GERD/Peptic Ulcer)    Anthropometric Measures:  Height: 185.4 cm (6' 1\")  Ideal Body Weight (IBW): 184 lbs (84 kg)       Current Body Weight: 61.7 kg (136 lb),   IBW. Weight Source: Bed Scale  Current BMI (kg/m2): 17.9                               Estimated Daily Nutrient Needs:  Energy Requirements Based On: Kcal/kg  Weight Used for Energy Requirements: Current  Energy (kcal/day): 
Comprehensive Nutrition Assessment    Type and Reason for Visit:  Reassess    Nutrition Recommendations/Plan:   Continue current diet, patient may need more insulin to cover his appetite/intakes  RD to follow/monitor.      Malnutrition Assessment:  Malnutrition Status:  Moderate malnutrition (09/19/24 1259)    Context:  Chronic Illness     Findings of the 6 clinical characteristics of malnutrition:  Energy Intake:  No significant decrease in energy intake  Weight Loss:  No significant weight loss     Body Fat Loss:  Severe body fat loss Triceps   Muscle Mass Loss:  Severe muscle mass loss Temples (temporalis), Hand (interosseous), Clavicles (pectoralis & deltoids), Calf (gastrocnemius)  Fluid Accumulation:  No significant fluid accumulation     Strength:       Nutrition Assessment:    Patient continues to do well, moving towards discharge per chart review. Reports abdominal pain and diarrhea to MD but nurse notes solid stool. Patient denies needs to this RD, did ask for chips and crackers. Patient with history of diabetes, A1c 7.8%. Glucose elevated. Patient has very good appetite and likes to eat. Weight is up from admission. Currently 136#, 115# on admission. Labs, meds, PMH reviewed.    Nutrition Related Findings:    New stage 1 buttocks, no edema noted. Meds include vitamin D and NaCl@50 ml/hr. LBM 9/24. Wound Type: None       Current Nutrition Intake & Therapies:    Average Meal Intake: %     ADULT DIET; Regular; 5 carb choices (75 gm/meal); GI [place] (GERD/Peptic Ulcer)    Anthropometric Measures:  Height: 185.4 cm (6' 1\")  Ideal Body Weight (IBW): 184 lbs (84 kg)       Current Body Weight: 61.7 kg (136 lb),   IBW. Weight Source: Bed Scale  Current BMI (kg/m2): 17.9                               Estimated Daily Nutrient Needs:  Energy Requirements Based On: Kcal/kg  Weight Used for Energy Requirements: Current  Energy (kcal/day): 4339-1451 (30-35)  Weight Used for Protein Requirements: 
Daughter called writer in regards to the care being given to the patient. Daughter questioned why a dailey catheter was not placed on day shift with the Urologist. Daughter concern with the patient retaining urine, stating patient could go \"septic\". Writer attempt to explain the plan of care for patient today. Plan for cystoscopy and dailey placement at 1200. Daughter not satisfied with explanation, requested to talk lead nurse. Lead nurse occupied. Call transferred to House supervisor for clarification.  
Dr Gao came to see pt no new orders at this time  
Dr. Gao notified of pt urine in dailey being completely blood but no clots noticed. Order placed for irrigating dailey with 50mL NS PRN. Care ongoing.   
End Of Shift Note  [place] [facility]  Summary of shift: pt had uneventful shift. Fentanyl and benadryl given prn. Pt continues to complain of constipation, he did have 2 large bm's at beginning of shift. Pt restless and one time ambien order place.... was able to rest comfortably for a few hours.     Vitals:    Vitals:    09/15/24 2351 09/16/24 0314 09/16/24 0543 09/16/24 0840   BP:       Pulse:   64    Resp: 21 20 22    Temp:    98.1 °F (36.7 °C)   TempSrc:    Axillary   SpO2:   97%    Weight:       Height:            I&O:   Intake/Output Summary (Last 24 hours) at 9/16/2024 1013  Last data filed at 9/15/2024 2326  Gross per 24 hour   Intake --   Output 100 ml   Net -100 ml       Resp Status: room air     Ventilator Settings:     / / /     Critical Care IV infusions:   sodium chloride 5 mL/hr at 09/13/24 1826    dextrose          LDA:   Peripheral IV 09/15/24 Left Cephalic (Active)   Number of days: 0       Wound Knee Posterior (Active)   Number of days:        Wound 02/23/24 Pretibial Proximal;Right (Active)   Number of days: 205       Wound 02/23/24 Pretibial Left;Proximal (Active)   Number of days: 205       Wound Buttocks (Active)   Number of days:           
End Of Shift Note  [place] [facility]  Summary of shift: pt complaining of pain throughout night, prn pain meds given appropriately. Had 2 large BM at start of shift, 1 small bm at end of shift, all watery and tan in color. Pt was requesting enemas throughout night, explained adverse effects of saline enemas, no enema give. Pt complaining of no sleep since admission, melatonin and trazodone unsuccessful the night prior... NP аТтьяна ordered I.v benadryl, pt slept comfortably. Multiple urine occurences, non-measurable.Pt remains fixated on stool, stating he needs to get everything out.       Vitals:    Vitals:    09/15/24 0320 09/15/24 0400 09/15/24 0500 09/15/24 0600   BP:  (!) 138/55 (!) 113/54 (!) 148/58   Pulse:  79 67 73   Resp: 19 20 20 21   Temp:  98.7 °F (37.1 °C)     TempSrc:  Oral     SpO2:  96%     Weight:       Height:            I&O:   Intake/Output Summary (Last 24 hours) at 9/15/2024 0758  Last data filed at 9/15/2024 0400  Gross per 24 hour   Intake 92.54 ml   Output 240 ml   Net -147.46 ml       Resp Status: room air     Ventilator Settings:     / / /     Critical Care IV infusions:   sodium chloride 5 mL/hr at 09/13/24 1826    dextrose          LDA:   Peripheral IV 09/12/24 Right Antecubital (Active)   Number of days: 2       Peripheral IV 09/12/24 Left Forearm (Active)   Number of days: 2       Wound Knee Posterior (Active)   Number of days:        Wound 02/23/24 Pretibial Proximal;Right (Active)   Number of days: 204       Wound 02/23/24 Pretibial Left;Proximal (Active)   Number of days: 204       Wound Buttocks (Active)   Number of days:           
End Of Shift Note  [place] ICU  Summary of shift: Patient irritable and frustrated with pain. Fentanyl given at: 0347 and 0623. Pulling off leads a couple times. Overnight patient complained pain due to constipation, requested for a fleet enema, stating that it made them feel better the last time we ordered one. NP notified and denied request. Patient become tearful, called daughter. Daughter talked to writer with concerns with patient care. Writer and House supervisor discuss plan of care for patient. Dr. Gao arrived at shift change and inserted a dailey, urine output noted.     Vitals:    Vitals:    09/17/24 0412 09/17/24 0500 09/17/24 0700 09/17/24 0801   BP: (!) 142/69      Pulse: 66 69 71    Resp: 25 23 19    Temp: 97.7 °F (36.5 °C)   98.4 °F (36.9 °C)   TempSrc: Oral   Oral   SpO2: 95% (!) 86%     Weight:       Height:            I&O: No intake or output data in the 24 hours ending 09/17/24 0806    Resp Status: RA    Ventilator Settings:     / / /     Critical Care IV infusions:   sodium chloride 5 mL/hr at 09/13/24 1826    dextrose          LDA:   Peripheral IV 09/15/24 Left Cephalic (Active)   Number of days: 1       Wound Knee Posterior (Active)   Number of days:        Wound 02/23/24 Pretibial Proximal;Right (Active)   Number of days: 206       Wound 02/23/24 Pretibial Left;Proximal (Active)   Number of days: 206       Wound Buttocks (Active)   Number of days:           
End Of Shift Note  [place] ICU  Summary of shift: Patient admitted to unit around 0030. Aox4. Patient requesting fleet enema, pain meds, and sleep meds. Patient received fleet enema, minimal stool output noted. Fentanyl 50 mcg x1, not effective. NP notified, 0.5 mg dilaudid Q4hr ordered. Sodium increased from 130 -> 135 in 9 hour window, Nephrology messaged. External cath in place, 300 ml UOP. Cefepime and Vanco started. Bicarb in D5% ordered. Call light in reach. Side rail up x2    Vitals:    Vitals:    09/13/24 0037 09/13/24 0043 09/13/24 0400 09/13/24 0441   BP: 115/63  (!) 98/43    Pulse: 94 91 76 77   Resp: 22 22 19 17   Temp: 98.5 °F (36.9 °C)  98.1 °F (36.7 °C)    TempSrc: Oral  Oral    SpO2:  95% 96% 96%   Weight:       Height:            I&O:   Intake/Output Summary (Last 24 hours) at 9/13/2024 0456  Last data filed at 9/13/2024 0441  Gross per 24 hour   Intake --   Output 150 ml   Net -150 ml       Resp Status: RA    Ventilator Settings:     / / /     Critical Care IV infusions:   sodium chloride      dextrose      sodium bicarbonate 100 mEq in dextrose 5 % 1,000 mL infusion 50 mL/hr at 09/13/24 0438        LDA:   Peripheral IV 09/12/24 Right Antecubital (Active)   Number of days: 0       Peripheral IV 09/12/24 Left Forearm (Active)   Number of days: 0       External Urinary Catheter (Active)   Number of days: 0       Wound Knee Posterior (Active)   Number of days:        Wound 02/23/24 Pretibial Proximal;Right (Active)   Number of days: 202       Wound 02/23/24 Pretibial Left;Proximal (Active)   Number of days: 202          
End Of Shift Note  [place] ICU  Summary of shift: Patient anxious and fidgety all shift. Patient complained of abdominal pain, cramping all shift. PRN Fentanyl given every 2-3 hrs. Dr. Daugherty added Levsin PRN to help control pain and spasms as well as the scheduled Bentyl. Dr. Hewitt increased Vancomycin to 250 mg 4 times a day. Patient had 4 loose/watery tan bowel movements but had 4 other episodes of cramping and feeling of needing to have BM but only urinated. Patient urinating on only 50-100ml at a time and urinated approximately 500ml this shift. Patient complaining of being unable to sleep and that the Benadryl given last night helped so Dr. Daugherty placed orders for 25mg Benadryl oral every 6 hrs as needed. Daughter concerned that the Vancomycin did not seem to be improving his symptoms and that he had not responded to 10 days course treatments in past and request to see if Dificid would be better choice. Dr. Gorman and Dr. Hewitt agreed and order was changed to Dificid 200mg orally BID and Vanco oral was discontinued.     Vitals:    Vitals:    06/14/22 0351 06/14/22 0400 06/14/22 0500 06/14/22 0600   BP:  127/79 117/75 127/74   Pulse: 82 82 82 78   Resp: 17 17 20 28   Temp:  97.3 °F (36.3 °C)     TempSrc:  Temporal     SpO2: 95% 94% 91% 95%   Weight:       Height:            I&O:     Intake/Output Summary (Last 24 hours) at 6/14/2022 0713  Last data filed at 6/14/2022 0600  Gross per 24 hour   Intake 2107.03 ml   Output 3402.3 ml   Net -1295.27 ml       Resp Status: Room Air. Frequent moist productive cough through out the day with production of white thick sputum. Chest xray ordered but showed no acute Cardiopulmonary process.     Ventilator Settings:None      Critical Care IV infusions:   PN-Adult 2-in-1 Central Line (Standard) 75 mL/hr at 06/13/22 2129    sodium chloride      sodium chloride      norepinephrine Stopped (06/10/22 1417)    sodium chloride      dextrose      sodium chloride 15 mL/hr at 
[facility]  Office: [phone]  Baldomero Maldonado DO, Luis Valdivia, DO, Eric Rojas DO, Rajinder Chiu, DO, Erica Daugherty MD, Carolina Hernandez MD, Haleigh Sorto MD, Hannah Blum MD,  Isael Velazco MD, Cora Coughlin MD, Alex Ferrell MD,  Chela Doan DO, Trent Kearns MD, Manolo Potter MD, Hans Maldonado DO, Mei Tolbert MD,  Gianni De La Rosa DO, Aranza Wakefield MD, Magdalena Dill MD, Mary Ann Cheek MD, Jenny Williamson MD,  Panfilo Hall MD, Dorcas Gillis MD, Nato Gunter MD, Opal Davis MD, Red Small MD, Giana Larsen MD, Trenton Wong, DO, Roberth Eason DO, Carmeol Conroy DO, Joe Franklin MD, Shirley Waterhouse, CNP,  Xena Harrell CNP, Trenton Campbell, CNP,  Celena Corona, DNP, Terrie Mathur, CNP, Dalia Lopes, CNP, Chiquita Manuel, CNP, Anh Kolb, CNP, Geetha Gold, PA-C, Rubina Miller PA-C, Gris Thorpe, CNP, Reymundo Todd, CNP,  Cristal Padilla, CNP, Elizabeth Clemente, CNP, Kelin Hernandez, CNP, Lea Fletcher, CNS, Maria Esther Silverio, CNP, Norma Soliman, CNP, Tracy Schwab, CNP         [facility]   IN-PATIENT SERVICE      Progress Note    9/25/2024    10:27 AM    Name:   Nicolas Cardenas  MRN:     [id number]     Acct:      [id number]   Room:   1019/1019-02   Day:  13  Admit Date:  9/12/2024  5:04 PM    PCP:   Rosa Pond APRN - NP  Code Status:  Full Code    Subjective:     C/C:   Chief Complaint   Patient presents with    GI Problem     Patient states he is constipated, took laxatives and had liquid stool.     Urinary Retention     Has not urinated since yesterday.      Interval History Status: improved.     Pending [place].  C. difficile-s/p Difficid.  Patient requesting bowel regimen even though he had 2 large bm last night.  Cystoscopy outpatient per urology.  LUMA?-nephrology following.  On fluids.  Avoid nephrotoxic agents.    Ok for discharge  Brief History:     Patient presents emergency department with complaints of constipation and urinary 
[facility]  Office: [phone]  Baldomero Maldonado DO, Luis Valdivia, DO, Eric Rojas DO, Rajinder Chiu, DO, rEica Daugherty MD, Carolina Hernandez MD, Haleigh Sorto MD, Hannah Blum MD,  Isael Velazco MD, Cora Coughlin MD, Alex Ferrell MD,  Chela Doan DO, Trent Kearns MD, Manolo Potter MD, Hans Maldonado DO, Mei Tolbert MD,  Gianni De La Rosa DO, Aranza Wakefield MD, Magdalena Dill MD, Mary Ann Cheek MD, Jenny Williamson MD,  Panfilo Hall MD, Dorcas Gillis MD, Nato Gunter MD, Opal Davis MD, Red Small MD, Giana Larsen MD, Trenton Wong, DO, Roberth Eason DO, Carmelo Conroy DO, Joe Franklin MD, Shirley Waterhouse, CNP,  Xena Harrell CNP, Trenton Campbell, CNP,  Celena Corona, DNP, Terrie Mathur, CNP, Dalia Lopes, CNP, Chiquita Manuel, CNP, Anh Kolb, CNP, Geetha Gold, PA-C, Rubina Miller PA-C, Gris Thorpe, CNP, Reymundo Todd, CNP,  Cristal Padilla, CNP, Elizabeth Clemente, CNP, Kelin Hernandez, CNP, Lea Fletcher, CNS, Maria Esther Silverio, CNP, Norma Soliman, CNP, Tracy Schwab, CNP         [facility]   IN-PATIENT SERVICE      Progress Note    9/24/2024    11:32 AM    Name:   Nicolas Cardenas  MRN:     [id number]     Acct:      [id number]   Room:   1019/1019-02   Day:  12  Admit Date:  9/12/2024  5:04 PM    PCP:   Rosa Pond APRN - NP  Code Status:  Full Code    Subjective:     C/C:   Chief Complaint   Patient presents with    GI Problem     Patient states he is constipated, took laxatives and had liquid stool.     Urinary Retention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 & HS    sodium chloride flush  5-40 mL IntraVENous 2 times per day    insulin glargine  18 Units SubCUTAneous Daily    insulin lispro  0-4 Units SubCUTAneous TID WC    insulin lispro  0-4 Units SubCUTAneous Nightly    cholestyramine light  4 g Oral Daily    warfarin placeholder: dosing by pharmacy   Other RX Placeholder    guaiFENesin  600 mg Oral BID    sodium bicarbonate  1,300 mg Oral BID    lipase-protease-amylase  20,000 Units Oral TID WC    And    lipase-protease-amylas  5,000 Units Oral TID WC   Continuous Infusions:   sodium chloride 75 mL/hr at 09/18/24 0547    sodium chloride 5 mL/hr at 09/13/24 1826    dextrose       PRN Meds:oxyCODONE-acetaminophen **OR** oxyCODONE-acetaminophen, lidocaine, diphenhydrAMINE, hyoscyamine, zolpidem, diphenhydrAMINE, sodium chloride flush, sodium chloride, potassium chloride **OR** potassium alternative oral replacement **OR** potassium chloride, magnesium sulfate, acetaminophen **OR** acetaminophen, glucose, dextrose bolus **OR** dextrose bolus, glucagon (rDNA), dextrose, melatonin, ipratropium 0.5 mg-albuterol 2.5 mg, ondansetron **OR** ondansetron    Allergies   Allergen Reactions    Ativan [Lorazepam] Anaphylaxis    Gabapentin Other (See Comments)     dizziness    Other        Physical Exam:  Blood pressure 135/62, pulse 64, temperature 97.4 °F (36.3 °C), temperature source Axillary, resp. rate 16, height 1.854 m (6' 1\"), weight 62.3 kg (137 lb 6.4 oz), SpO2 98%.  In: 1652.5 [P.O.:420; I.V.:1232.5]  Out: 3000   In: 1652.5   Out: 3000 [Urine:3000]    Lethargic no acute distress  Lungs clear  Heart sounds regular S1-S2 no pericardial 
Reason for Follow up: Renal dysfunction    HISTORY:      Patient is having frequent diarrhea movements but complained that he feels that he is constipated.  No chest pain or shortness of breath.      All other ROS is negative.      Scheduled Meds:   warfarin  5 mg Oral Once    dicyclomine  20 mg Oral 4x Daily AC & HS    sodium chloride flush  5-40 mL IntraVENous 2 times per day    insulin glargine  18 Units SubCUTAneous Daily    insulin lispro  0-4 Units SubCUTAneous TID WC    insulin lispro  0-4 Units SubCUTAneous Nightly    cholestyramine light  4 g Oral Daily    tamsulosin  0.4 mg Oral Nightly    warfarin placeholder: dosing by pharmacy   Other RX Placeholder    guaiFENesin  600 mg Oral BID    sodium bicarbonate  1,300 mg Oral BID    lipase-protease-amylase  20,000 Units Oral TID WC    And    lipase-protease-amylas  5,000 Units Oral TID WC    vancomycin  125 mg Oral 4 times per day   Continuous Infusions:   sodium chloride 5 mL/hr at 09/13/24 1826    dextrose       PRN Meds:fentanNYL **OR** fentanNYL, diphenhydrAMINE, sodium chloride flush, sodium chloride, potassium chloride **OR** potassium alternative oral replacement **OR** potassium chloride, magnesium sulfate, acetaminophen **OR** acetaminophen, glucose, dextrose bolus **OR** dextrose bolus, glucagon (rDNA), dextrose, melatonin, ipratropium 0.5 mg-albuterol 2.5 mg, ondansetron **OR** ondansetron    Allergies   Allergen Reactions    Ativan [Lorazepam] Anaphylaxis    Gabapentin Other (See Comments)     dizziness    Other        Physical Exam:  Blood pressure (!) 148/58, pulse 73, temperature 98.7 °F (37.1 °C), temperature source Oral, resp. rate 21, height 1.854 m (6' 1\"), weight 54.7 kg (120 lb 9.5 oz), SpO2 96%.  In: 92.5   Out: 240   In: 92.5   Out: 240 [Urine:240]    Patient is alert thin built no acute respiratory distress  Looks chronically ill.  No JVD  Lungs clear.  Heart sounds regular no rubs.  Abdomen soft nontender.  Bilateral BKA.    Data:  CBC: 
Reason for Follow up: Renal dysfunction    HISTORY:      Patient is having frequent diarrhea.  No chest pain or shortness of breath.      All other ROS is negative.      Scheduled Meds:   sodium chloride flush  5-40 mL IntraVENous 2 times per day    insulin glargine  18 Units SubCUTAneous Daily    insulin lispro  0-4 Units SubCUTAneous TID WC    insulin lispro  0-4 Units SubCUTAneous Nightly    cholestyramine light  4 g Oral Daily    tamsulosin  0.4 mg Oral Nightly    warfarin placeholder: dosing by pharmacy   Other RX Placeholder    cefepime  2,000 mg IntraVENous Q12H    guaiFENesin  600 mg Oral BID    sodium bicarbonate  1,300 mg Oral BID    lipase-protease-amylase  20,000 Units Oral TID WC    And    lipase-protease-amylas  5,000 Units Oral TID WC    vancomycin  125 mg Oral 4 times per day   Continuous Infusions:   sodium chloride 5 mL/hr at 09/13/24 1826    dextrose       PRN Meds:sodium chloride flush, sodium chloride, potassium chloride **OR** potassium alternative oral replacement **OR** potassium chloride, magnesium sulfate, acetaminophen **OR** acetaminophen, glucose, dextrose bolus **OR** dextrose bolus, glucagon (rDNA), dextrose, melatonin, ipratropium 0.5 mg-albuterol 2.5 mg, ondansetron **OR** ondansetron, HYDROmorphone **OR** HYDROmorphone, dicyclomine    Allergies   Allergen Reactions    Ativan [Lorazepam] Anaphylaxis    Gabapentin Other (See Comments)     dizziness    Other        Physical Exam:  Blood pressure (!) 125/52, pulse 74, temperature 98.3 °F (36.8 °C), temperature source Oral, resp. rate 20, height 1.854 m (6' 1\"), weight 54.7 kg (120 lb 9.5 oz), SpO2 94%.  In: -   Out: 590   In: -   Out: 590 [Urine:590]    Patient is alert thin built no acute respiratory distress  Looks chronically ill.  No JVD  Lungs clear.  Heart sounds regular no rubs.  Abdomen soft nontender.  Bilateral BKA.    Data:  CBC:   Lab Results   Component Value Date    WBC 16.5 (H) 09/13/2024    HGB 9.1 (L) 09/13/2024    HCT 
Report given to Lory. All questions and concerns addressed  
[facility]  Office: [phone]  Baldomero Maldonado, DO, Luis Valdivia, DO, Eric Rojas DO, Rajinder Chui, DO, Erica Daugherty MD, Carolina Hernandez MD, Haleigh Sorto MD, Hannah Blum MD,  Isael Velazco MD, Cora Coughlin MD, Alex Ferrell MD,  Chela Doan DO, Trent Kearns MD, Manolo Potter MD, Hans Maldonado DO, Mei Tolbert MD,  Gianni De La Rosa DO, Aranza Wakefield MD, Magdalena Dill MD, Mary Ann Cheek MD, Jenny Williamson MD,  Panfilo Hall MD, Dorcas Gillis MD, Nato Gunter MD, Opal Davis MD, Red Small MD, Giana Larsen MD, Trenton Wong, DO, Roberth Eason DO, Carmelo Conroy DO, Joe Franklin MD, Shirley Waterhouse, CNP,  Xena Harrell CNP, Trenton Campbell, CNP,  Celena Corona, DNP, Terrie Mathur, CNP, Dalia Lopes, CNP, Chiquita Manuel, CNP, Anh Kolb, CNP, Geetha Gold, PA-C, Rubina Miller, PA-C, Gris Thorpe, CNP, Reymundo Todd, CNP,  Cristal Padilla, CNP, Elizabeth Clemente, CNP, Kelin Hernandez, CNP, Lea Fletcher, CNS, Maria Esther Silverio, CNP, Norma Soliman, CNP, Tracy Schwab, CNP         [facility]   IN-PATIENT SERVICE   [facility]    Progress Note    9/15/2024    12:00 PM    Name:   Nicolas Cardenas  MRN:     [id number]     Acct:      [id number]   Room:   1106/1106-01   Day:  3  Admit Date:  9/12/2024  5:04 PM    PCP:   Rosa Pond APRN - NP  Code Status:  Full Code    Subjective:     C/C:   Chief Complaint   Patient presents with    GI Problem     Patient states he is constipated, took laxatives and had liquid stool.     Urinary Retention     Has not urinated since yesterday.      Interval History Status: .   Having chills, nausea and  denies vomiting  Had 2 loose stools at night and 1 loose and watery stool today morning  Was originally admitted with constipation and urinary retention, had taken some laxatives, currently having large volume liquid stools and  is able to pass urine by himself.  He was recently admitted at [facility] 
[facility]  Office: [phone]  Baldomero Maldonado, DO, Luis Valdivia, DO, Eric Rojas DO, Rajinder Chiu, DO, Erica Daugherty MD, Carolina Hernandez MD, Haleigh Sorto MD, Hannah Blum MD,  Isael Velazco MD, Cora Coughlin MD, Alex Ferrell MD,  Chela Doan DO, Trent Kearns MD, Manolo Potter MD, Hans Maldonado DO, Mei Tolbert MD,  Gianni De La Rosa DO, Aranza Wakefield MD, Magdalena Dill MD, Mary Ann Cheek MD, Jenny Williamson MD,  Panfilo Hall MD, Dorcas Gillis MD, Nato Gunter MD, Opal Davis MD, Red Small MD, Giana Larsen MD, Trenton Wong, DO, Roberth Eason DO, Carmelo Conroy DO, Joe Franklin MD, Shirley Waterhouse, CNP,  Xena Harrell CNP, Trenton Campbell, CNP,  Celena Corona, DNP, Terrie Mathur, CNP, Dalia Lopes, CNP, Chiquita Manuel, CNP, Anh Kolb, CNP, Geetha Gold, PA-C, Rubina Miller, PA-C, Gris Thorpe, CNP, Reymundo Todd, CNP,  Cristal Padilla, CNP, Elizabeth Clemente, CNP, Kelin Hernandez, CNP, Lea Fletcher, CNS, Maria Esther Silverio, CNP, Norma Soliman, CNP, Tracy Schwab, CNP         [facility]   IN-PATIENT SERVICE   [facility]    Progress Note    9/13/2024    12:08 PM    Name:   Nicolas Cardenas  MRN:     [id number]     Acct:      [id number]   Room:   1106/1106-01   Day:  1  Admit Date:  9/12/2024  5:04 PM    PCP:   Jono Vargas APRN - NP  Code Status:  Full Code    Subjective:     C/C:   Chief Complaint   Patient presents with    GI Problem     Patient states he is constipated, took laxatives and had liquid stool.     Urinary Retention     Has not urinated since yesterday.      Interval History Status: .   Having chills, nausea and vomiting  Was originally admitted with constipation and urinary retention, had taken some laxatives, currently having large volume liquid stools and  is able to pass urine by himself.  He was recently admitted at [facility] and was treated for C. difficile infection    Brief History:     As 
Spiritual Health Assessment/Progress Note  [facility]    (P) Spiritual/Emotional Needs, Initial Encounter,  ,  ,      Name: Nicolas Cardenas MRN: [id number]    Age: 68 y.o.     Sex: male   Language: English   Congregational: Taoism   Sepsis (HCC)     Date: 9/13/2024            Total Time Calculated: (P) 9 min              Spiritual Assessment began in [facility] ICU        Referral/Consult From: (P) Rounding   Encounter Overview/Reason: (P) Spiritual/Emotional Needs, Initial Encounter  Service Provided For: (P) Patient  Patient reported Taoism caroline background and values prayer.  Caroline, Belief, Meaning:   Patient has beliefs or practices that help with coping during difficult times  Family/Friends No family/friends present      Importance and Influence:  Patient has spiritual/personal beliefs that influence decisions regarding their health  Family/Friends no family/friends present    Community:  Patient feels well-supported. Support system includes: Children and Friends  Family/Friends Other: unknown    Assessment and Plan of Care:     Patient Interventions include: Facilitated expression of thoughts and feelings and Affirmed coping skills/support systems  Family/Friends Interventions include: Other: not present    Patient Plan of Care: Spiritual Care available upon further referral  Family/Friends Plan of Care: Spiritual Care available upon further referral    Electronically signed by Chaplain Orlando on 9/13/2024 at 8:53 AM   
Spiritual Health History and Assessment/Progress Note  [facility]    Spiritual/Emotional Needs, Initial Encounter,  ,  ,      Name: Nicolas Cardenas MRN: [id number]    Age: 68 y.o.     Sex: male   Language: English   Restorationism: Worship   Sepsis (HCC)     Date: 9/23/2024            Total Time Calculated: 9 min              Spiritual Assessment continued in [facility]        Referral/Consult From: Rounding   Encounter Overview/Reason: Spiritual/Emotional Needs, Initial Encounter  Service Provided For: Patient  Patient values prayer.  Caroline, Belief, Meaning:   Patient is connected with a caroline tradition or spiritual practice and has beliefs or practices that help with coping during difficult times  Family/Friends No family/friends present      Importance and Influence:  Patient has spiritual/personal beliefs that influence decisions regarding their health  Family/Friends No family/friends present    Community:  Patient Other: Patient has mostly a singular friend, Lorelei.  Family/Friends No family/friends present    Assessment and Plan of Care:     Patient Interventions include: Facilitated expression of thoughts and feelings and Explored spiritual coping/struggle/distress  Family/Friends Interventions include: No family/friends present    Patient Plan of Care: Spiritual Care available upon further referral  Family/Friends Plan of Care: Spiritual Care available upon further referral    Electronically signed by Chaplain Orlando on 9/23/2024 at 11:00 AM   
[facility]  Office: [phone]  Baldomero Maldonado, DO, Luis Valdivia, DO, Eric Rojas DO, Rajinder Chiu, DO, Erica Daugherty MD, Carolina Hernandez MD, Haleigh Sorto MD, Hannah Blum MD,  Isael Velazco MD, Cora Coughlin MD, Alex Ferrell MD,  Chela Doan DO, Trent Kearns MD, Manolo Potter MD, Hans Maldonado DO, Mei Tolbert MD,  Gianni De La Rosa DO, Aranza Wakefield MD, Magdalena Dill MD, Mary Ann Cheek MD, Jenny Williamson MD,  Panfilo Hall MD, Dorcas Gillis MD, Nato Gunter MD, Opal Davis MD, Red Small MD, Giana Larsen MD, Trenton Wong, DO, Roberth Esaon DO, Carmelo Conroy DO, Joe Franklin MD, Shirley Waterhouse, CNP,  Xena Harrell CNP, Trenton Campbell, CNP,  Celena Corona, DNP, Terrie Mathur, CNP, Dalia Lopes, CNP, Chiquita Manuel, CNP, Anh Kolb, CNP, Geetha Gold, PA-C, Rubina Miller, PA-C, Gris Thopre, CNP, Reymundo Todd, CNP,  Cristal Padilla, CNP, Elizabeth Clemente, CNP, Kelin Hernandez, CNP, Lea Fletcher, CNS, Maria Esther Silverio, CNP, Norma Soliman, CNP, Tracy Schwab, CNP       [facility]      Daily Progress Note     Admit Date: 9/12/2024  Bed/Room No.  1019/1019-02  Admitting Physician : Haleigh Sorto MD  Code Status :Full Code  Hospital Day:  LOS: 6 days   Chief Complaint:     Chief Complaint   Patient presents with    GI Problem     Patient states he is constipated, took laxatives and had liquid stool.     Urinary Retention     Has not urinated since yesterday.      Principal Problem:    Sepsis (HCC)  Active Problems:    DM type 2 with diabetic peripheral neuropathy (HCC)    COPD without exacerbation (HCC)    Acute kidney injury superimposed on CKD (HCC)    S/P BKA (below knee amputation) bilateral (HCC)    Urinary retention    Acute cystitis without hematuria    Secondary hypercoagulable state (HCC)    Chronic pancreatitis (HCC)    Urinary tract infection without hematuria  Resolved Problems:    * No resolved hospital problems. 
[facility]  Office: [phone]  Baldomero Maldonado, DO, Luis Valdivia, DO, Eric Rojas DO, Rajinder Chiu, DO, Erica Daugherty MD, Carolina Hernandez MD, Haleigh Sorto MD, Hannah Blum MD,  Isael Velazco MD, Cora Coughlin MD, Alex Ferrell MD,  Chela Doan DO, Trent Kearns MD, Manolo Potter MD, Hans Maldonado DO, Mei Tolbert MD,  Gianni De La Rosa DO, Aranza Wakefield MD, Magdalena Dill MD, Mary Ann Cheek MD, Jenny Williamson MD,  Panfilo Hall MD, Dorcas Gillis MD, Nato Gunter MD, Opal Davis MD, Red Small MD, Giana Larsen MD, Trenton Wong, DO, Roberth Eason DO, Carmelo Conroy DO, Joe Franklin MD, Shirley Waterhouse, CNP,  Xena Harrell CNP, Trenton Campbell, CNP,  Celena Corona, DNP, Terrie Mathur, CNP, Dalia Lopes, CNP, Chiquita Manuel, CNP, Anh Kolb, CNP, Geetha Gold, PA-C, Rubina Miller, PA-C, Gris Thorpe, CNP, Reymundo Todd, CNP,  Cristal Padilla, CNP, Elizabeth Clemente, CNP, Kelin Hernandez, CNP, Lea Fletcher, CNS, Maria Esther Silverio, CNP, Norma Soliman, CNP, Tracy Schwab, CNP       [facility]      Daily Progress Note     Admit Date: 9/12/2024  Bed/Room No.  1106/1106-01  Admitting Physician : Haleigh Sorto MD  Code Status :Full Code  Hospital Day:  LOS: 5 days   Chief Complaint:     Chief Complaint   Patient presents with    GI Problem     Patient states he is constipated, took laxatives and had liquid stool.     Urinary Retention     Has not urinated since yesterday.      Principal Problem:    Sepsis (HCC)  Active Problems:    DM type 2 with diabetic peripheral neuropathy (HCC)    COPD without exacerbation (HCC)    Acute kidney injury superimposed on CKD (HCC)    S/P BKA (below knee amputation) bilateral (HCC)    Urinary retention    Acute cystitis without hematuria    Secondary hypercoagulable state (HCC)    Chronic pancreatitis (HCC)    Urinary tract infection without hematuria  Resolved Problems:    * No resolved hospital problems. 
Transitions of Care Pharmacy Service   Medication Review  The patient's list of current home medications has been reviewed.     Source(s) of information: [facility]    Based on information provided by the above source(s), I have updated the patient's home med list as described below.     I changed or updated the following medications on the patient's home medication list:  Removed Amlodipine  Eliquis  Aspirin  Atorvastatin  Doxepin  Trulicity  Famotidine  Melatonin  Metoprolol Succinate  Triple antibiotic ointment  Probiotic  Januvia  Bactrim     Added Creon  Quetiapine  Flomax  Incruse  Warfarin  Vitamin D3     Adjusted   Ferrous sulfate  Insulin Glargine     Other Notes Patient does not use Incruse Ellipta inhaler as prescribed.          PROVIDER ACTION REQUESTED  Medications that need to be addressed by a physician/nurse practitioner:    Medication Action Requested   Incruse Ellipta  Seroquel  Omeprazole Consider resuming medication(s) patient was taking prior to admission if appropriate.       Please feel free to call me with any questions about this encounter. Thank you.     This note will be reviewed by the Transitions of Care or ED Pharmacist.    MARI GARCIA, PharmD student  Transitions of Care Pharmacy Service  Phone:  [phone]  Fax: [phone]      Electronically signed by MARI GARCIA on 9/13/2024 at 1:17 PM      Prior to Admission medications    Medication Sig   warfarin (COUMADIN) 2.5 MG tablet Take 1 tablet by mouth daily   umeclidinium bromide (INCRUSE ELLIPTA) 62.5 MCG/ACT inhaler Inhale 1 puff into the lungs daily   tamsulosin (FLOMAX) 0.4 MG capsule Take 1 capsule by mouth daily   QUEtiapine (SEROQUEL) 50 MG tablet Take 1 tablet by mouth daily at bedtime   lipase-protease-amylase (CREON) 74293-78453 units delayed release capsule Take 1 capsule by mouth 3 times daily (with meals) as needed   Cholecalciferol (VITAMIN D3) 50 MCG (2000 UT) CAPS Take 1 capsule by mouth daily 
[facility]  Office: [phone]  Baldomero Maldonado, DO, Luis Vadlivia, DO, Eric Rojas DO, Rajinder Chiu, DO, Erica Daugherty MD, Carolina Hernandez MD, Haleigh Sorto MD, Hannah Blum MD,  Isael Velazco MD, Cora Coughlin MD, Alex Ferrell MD,  Chela Doan DO, Trent Kearns MD, Manolo Potter MD, Hans Maldonado DO, Mei Tolbert MD,  Gianni De La Rosa DO, Aranza Wakefield MD, Magdalena Dill MD, Mary Ann Cheek MD, Jenny Williamson MD,  Panfilo Hall MD, Dorcas Gillis MD, Nato Gunter MD, Opal Davis MD, Red Small MD, Giana Larsen MD, Trenton Wong, DO, Roberth Eason DO, Carmelo Conroy DO, Joe Franklin MD, Shirley Waterhouse, CNP,  Xena Harrell CNP, Trenton Campbell, CNP,  Celena Corona, DNP, Terrie Mathur, CNP, Dalia Lopes, CNP, Chiquita Manuel, CNP, Anh Kolb, CNP, Geetha Gold, PA-C, Rubina Miller, PA-C, Gris Thorpe, CNP, Reymundo Todd, CNP,  Cristal Padilla, CNP, Elizabeth Clemente, CNP, Kelin Hernandez, CNP, Lea Fletcher, CNS, Maria Esther Silverio, CNP, Norma Soliman, CNP, Tracy Schwab, CNP       [facility]      Daily Progress Note     Admit Date: 9/12/2024  Bed/Room No.  1019/1019-02  Admitting Physician : Haleigh Sorto MD  Code Status :Full Code  Hospital Day:  LOS: 7 days   Chief Complaint:     Chief Complaint   Patient presents with    GI Problem     Patient states he is constipated, took laxatives and had liquid stool.     Urinary Retention     Has not urinated since yesterday.      Principal Problem:    Sepsis (HCC)  Active Problems:    DM type 2 with diabetic peripheral neuropathy (HCC)    COPD without exacerbation (HCC)    Acute kidney injury superimposed on CKD (HCC)    S/P BKA (below knee amputation) bilateral (HCC)    Urinary retention    Acute cystitis without hematuria    Secondary hypercoagulable state (HCC)    Chronic pancreatitis (HCC)    Urinary tract infection without hematuria    Dysuria  Resolved Problems:    * No resolved hospital 
Warfarin Dosing - Pharmacy Consult Note  Consulting Provider: Reymundo Todd  Indication:  History of  VTE/PE  Warfarin Dose prior to admission: 2.5mg daily (managed by [facility])   Concurrent anticoagulants/antiplatelets: none  Significant Drug Interactions: No obvious interactions  Recent Labs     09/16/24  0738 09/17/24  0435 09/18/24  0507   INR 2.0 2.0 1.5     Recent warfarin administrations                     warfarin (COUMADIN) tablet 5 mg (mg) 5 mg Given 09/17/24 1736    warfarin (COUMADIN) tablet 5 mg (mg) 5 mg Given 09/15/24 1749                   Date   INR    Dose  9/12       1.7        ?  9/13       1.8        2.5mg  9/14       1.7        5 mg  9/15        1.7       5 mg  9/16        2.0       -   9/17        2.0       5 mg  9/17        1.5    Assessment/Plan  (Goal INR: 2 - 3)  INR is subtherapeutic after missing dose on 9/16. Will give warfarin 5mg dose today. INR check in AM.     Active problem list reviewed.  INR orders are placed.  Chart reviewed for pertinent labs, drug/diet interactions, and past doses.  Documentation of patient's clinical condition was reviewed.    Pharmacy Dosing:  Pharmacy will continue to follow.      
Warfarin Dosing - Pharmacy Consult Note  Consulting Provider: Reymundo Todd APRN - CNP   Indication:  History of  VTE/PE  Warfarin Dose prior to admission: 2.5 mg daily (managed by [facility])  Concurrent anticoagulants/antiplatelets: none  Significant Drug Interactions: No obvious interactions  Recent Labs     09/12/24  1740 09/12/24  2141   INR  --  1.7   HGB 10.7*  --    *  --      Recent warfarin administrations        No warfarin orders with administrations found.            Orders not given:            warfarin placeholder: dosing by pharmacy                   Date   INR    Dose  9/12      1.7         ?    Assessment/Plan  (Goal INR: 2 - 3)  INR is subtherapeutic. It is unknown if patient took his warfarin dose prior to admit (he have a friend who helps him with his meds). No warfarin will be given at this time.  Will review INR in the morning.    Active problem list reviewed.  INR orders are placed.  Chart reviewed for pertinent labs, drug/diet interactions, and past doses.  Documentation of patient's clinical condition was reviewed.    Pharmacy Dosing:  Pharmacy will continue to follow.      
Warfarin Dosing - Pharmacy Consult Note  Consulting Provider: Reymundo Todd CNP   Indication:  History of  VTE/PE  Warfarin Dose prior to admission: 2.5mg daily (managed by [facility])    Concurrent anticoagulants/antiplatelets: none  Significant Drug Interactions: No obvious interactions  Recent Labs     09/22/24  0631 09/23/24  0508 09/24/24  0623   INR 2.2 2.2 2.3   HGB 9.5* 9.3* 9.4*   * [phone]     Recent warfarin administrations                     warfarin (COUMADIN) tablet 5 mg (mg) 5 mg Given 09/23/24 1739    warfarin (COUMADIN) tablet 2.5 mg (mg) 2.5 mg Given 09/22/24 1658    warfarin (COUMADIN) tablet 7.5 mg (mg) 7.5 mg Given 09/21/24 1804                   Date   INR    Dose  9/12       1.7          ?  9/13       1.8         2.5mg  9/14       1.7         5 mg  9/15       1.7         5 mg  9/16       2.0         none - pt refused  9/17       2.0          5 mg  9/18       1.5          5 mg  9/19        1.5         5 mg  9/20        1.7        7.5 mg  9/21        1.7        7.5 mg  9/22        2.2         2.5mg  9/23        2.2          5 mg  9/23        2.3                Assessment/Plan  (Goal INR: 2 - 3)  INR within goal. Coumadin 2.5mg today. INR in AM.     Active problem list reviewed.  INR orders are placed.  Chart reviewed for pertinent labs, drug/diet interactions, and past doses.  Documentation of patient's clinical condition was reviewed.    Pharmacy Dosing:  Pharmacy will continue to follow.       
Warfarin Dosing - Pharmacy Consult Note  Consulting Provider: Reymundo Todd CNP   Indication:  History of  VTE/PE  Warfarin Dose prior to admission: 2.5mg daily (managed by [facility])   Concurrent anticoagulants/antiplatelets: none  Significant Drug Interactions: No obvious interactions  Recent Labs     09/12/24  1740 09/12/24  2141 09/13/24  0247 09/14/24  0919   INR  --  1.7 1.8 1.7   HGB 10.7*  --  9.1* 9.1*   *  --  684* 686*     Recent warfarin administrations                     warfarin (COUMADIN) tablet 2.5 mg (mg) 2.5 mg Given 09/13/24 1734                   Date   INR    Dose  9/12       1.7          ?  9/13       1.8         2.5mg  9/14       1.7     Assessment/Plan  (Goal INR: 2 - 3)  INR still below goal. Will give boosted dose today to hopefully improve this. Coumadin 5mg x 1. INR in AM.     Active problem list reviewed.  INR orders are placed.  Chart reviewed for pertinent labs, drug/diet interactions, and past doses.  Documentation of patient's clinical condition was reviewed.    Pharmacy Dosing:  Pharmacy will continue to follow.       
Warfarin Dosing - Pharmacy Consult Note  Consulting Provider: Reymundo Todd CNP   Indication:  History of  VTE/PE  Warfarin Dose prior to admission: 2.5mg daily (managed by [facility])   Concurrent anticoagulants/antiplatelets: none  Significant Drug Interactions: No obvious interactions  Recent Labs     09/13/24  0247 09/14/24  0919 09/15/24  0654   INR 1.8 1.7 1.7   HGB 9.1* 9.1* 9.8*   * 686* 664*     Recent warfarin administrations                     warfarin (COUMADIN) tablet 5 mg (mg) 5 mg Given 09/14/24 1835    warfarin (COUMADIN) tablet 2.5 mg (mg) 2.5 mg Given 09/13/24 1734                   Date   INR    Dose  9/12       1.7         ?  9/13       1.8        2.5mg  9/14       1.7         5 mg  9/15       1.7     Assessment/Plan  (Goal INR: 2 - 3)  INR still not increasing. Possibly due to malabsorption  of warfarin due to C difficile diarrhea. Will give another boosted dose today. Coumadin 5mg x 1. INR in AM.     Active problem list reviewed.  INR orders are placed.  Chart reviewed for pertinent labs, drug/diet interactions, and past doses.  Documentation of patient's clinical condition was reviewed.    Pharmacy Dosing:  Pharmacy will continue to follow.       
Warfarin Dosing - Pharmacy Consult Note  Consulting Provider: Reymundo Todd CNP   Indication:  History of  VTE/PE  Warfarin Dose prior to admission: 2.5mg daily (managed by [facility])   Concurrent anticoagulants/antiplatelets: none  Significant Drug Interactions: No obvious interactions  Recent Labs     09/14/24  0919 09/15/24  0654 09/16/24  0738 09/17/24  0435   INR 1.7 1.7 2.0 2.0   HGB 9.1* 9.8*  --   --    * 664*  --   --      Recent warfarin administrations                     warfarin (COUMADIN) tablet 5 mg (mg) 5 mg Given 09/15/24 1749    warfarin (COUMADIN) tablet 5 mg (mg) 5 mg Given 09/14/24 1835                   Date   INR    Dose  9/12       1.7        ?  9/13       1.8        2.5mg  9/14       1.7        5 mg  9/15        1.7       5 mg  9/16        2.0       2.5 mg  9/17        2.0     Assessment/Plan  (Goal INR: 2 - 3)  INR within lower range of goal. Will give coumadin 3.75mg x 1 today. INR in AM.     Active problem list reviewed.  INR orders are placed.  Chart reviewed for pertinent labs, drug/diet interactions, and past doses.  Documentation of patient's clinical condition was reviewed.    Pharmacy Dosing:  Pharmacy will continue to follow.       
Warfarin Dosing - Pharmacy Consult Note  Consulting Provider: Reymundo Todd CNP   Indication:  History of  VTE/PE  Warfarin Dose prior to admission: 2.5mg daily (managed by [facility])   Concurrent anticoagulants/antiplatelets: none  Significant Drug Interactions: No obvious interactions  Recent Labs     09/25/24  0504 09/26/24  0525 09/27/24  0606   INR 2.2 1.9 1.6   HGB 8.8* 8.4* 8.8*    389 410     Recent warfarin administrations                     warfarin (COUMADIN) tablet 5 mg (mg) 5 mg Given 09/25/24 1723    warfarin (COUMADIN) tablet 2.5 mg (mg) 2.5 mg Given 09/24/24 1727                   Date   INR    Dose  9/12       1.7          ?  9/13       1.8         2.5mg  9/14       1.7           5 mg  9/15       1.7           5 mg  9/16       2.0          none- pt refused  9/17       2.0           5 mg  9/18       1.5           5 mg  9/19       1.5           5 mg  9/20       1.7          7.5mg  9/21       1.7          7.5 mg  9/22       2.2          2.5 mg  9/23       2.2           5 mg  9/24       2.3          2.5 mg  9/25       2.2            5 mg  9/26       1.9            none - held for cysto  9/27       1.6       Assessment/Plan  (Goal INR: 2 - 3)  INR below goal due to dose being held yesterday for cysto. Will give boosted dose today, coumadin 10mg x 1. INR in AM.     Active problem list reviewed.  INR orders are placed.  Chart reviewed for pertinent labs, drug/diet interactions, and past doses.  Documentation of patient's clinical condition was reviewed.    Pharmacy Dosing:  Pharmacy will continue to follow.       
Warfarin Dosing - Pharmacy Consult Note  Consulting Provider: Reymundo Todd CNP  Indication:  History of  VTE/PE  Warfarin Dose prior to admission: 2.5 mg daily (managed by [facility])  Concurrent anticoagulants/antiplatelets: None  Significant Drug Interactions: No obvious interactions  Recent Labs     09/15/24  0654 09/16/24  0738 09/17/24  0435   INR 1.7 2.0 2.0   HGB 9.8*  --   --    *  --   --      Recent warfarin administrations                     warfarin (COUMADIN) tablet 5 mg (mg) 5 mg Given 09/15/24 1749    warfarin (COUMADIN) tablet 5 mg (mg) 5 mg Given 09/14/24 1835           Date   INR    Dose  9/12       1.7        ?  9/13       1.8        2.5mg  9/14       1.7        5 mg  9/15        1.7       5 mg  9/16        2.0       -  9/17        2.0       Assessment/Plan  (Goal INR: 2 - 3)  NR within lower range of goal. Patient refused dose of warfarin on 9/16. Will give coumadin 5 mg x 1 today. INR in AM.    Active problem list reviewed.  INR orders are placed.  Chart reviewed for pertinent labs, drug/diet interactions, and past doses.  Documentation of patient's clinical condition was reviewed.    Pharmacy Dosing:  Pharmacy will continue to follow.       This note will be reviewed and co-signed by the ICU/ED Clinical Pharmacist.    Kiley CHRISTINED candidate  Inpatient Pharmacy Phone #:  [phone]    
Warfarin Dosing - Pharmacy Consult Note  Consulting Provider: Reymundo Todd CNP  Indication:  History of  VTE/PE  Warfarin Dose prior to admission: 2.5mg daily (managed by [facility])   Concurrent anticoagulants/antiplatelets: none  Significant Drug Interactions: No obvious interactions  Recent Labs     09/18/24  0507 09/19/24  0535 09/20/24  0505   INR 1.5 1.5 1.7     Recent warfarin administrations                     warfarin (COUMADIN) tablet 5 mg (mg) 5 mg Given 09/19/24 1727    warfarin (COUMADIN) tablet 5 mg (mg) 5 mg Given 09/18/24 1735    warfarin (COUMADIN) tablet 5 mg (mg) 5 mg Given 09/17/24 1736                   Date   INR    Dose  9/12       1.7          ?  9/13       1.8         2.5 mg  9/14       1.7          5 mg  9/15       1.7          5 mg  9/16       2.0          none  9/17       2.0          5 mg  9/18       1.5          5 mg  9/19       1.5          5 mg  9/20       1.7     Assessment/Plan  (Goal INR: 2 - 3)  INR still below goal. Will give boosted dose today. Coumadin 7.5mg x 1. INR in AM.     Active problem list reviewed.  INR orders are placed.  Chart reviewed for pertinent labs, drug/diet interactions, and past doses.  Documentation of patient's clinical condition was reviewed.    Pharmacy Dosing:  Pharmacy will continue to follow.       
Warfarin Dosing - Pharmacy Consult Note  Consulting Provider: Reymundo Todd CNP  Indication:  History of  VTE/PE  Warfarin Dose prior to admission: 2.5mg daily (managed by [facility])   Concurrent anticoagulants/antiplatelets: none  Significant Drug Interactions: No obvious interactions  Recent Labs     09/19/24  0535 09/20/24  0505 09/21/24  0514   INR 1.5 1.7 1.7   HGB  --   --  9.2*   PLT  --   --  443     Recent warfarin administrations                     warfarin (COUMADIN) tablet 7.5 mg (mg) 7.5 mg Given 09/20/24 1832    warfarin (COUMADIN) tablet 5 mg (mg) 5 mg Given 09/19/24 1727    warfarin (COUMADIN) tablet 5 mg (mg) 5 mg Given 09/18/24 1735                   Date   INR    Dose  9/12       1.7          ?  9/13       1.8         2.5 mg  9/14       1.7          5 mg  9/15       1.7          5 mg  9/16       2.0          none  9/17       2.0          5 mg  9/18       1.5          5 mg  9/19       1.5          5 mg  9/20       1.7          7.5 mg  9/21       1.7    Assessment/Plan  (Goal INR: 2 - 3)  INR still below goal. Will give one more boosted dose today. Coumadin 7.5mg x 1. INR in AM.     Active problem list reviewed.  INR orders are placed.  Chart reviewed for pertinent labs, drug/diet interactions, and past doses.  Documentation of patient's clinical condition was reviewed.    Pharmacy Dosing:  Pharmacy will continue to follow.         
Warfarin Dosing - Pharmacy Consult Note  Consulting Provider: Reymundo Todd CNP  Indication:  History of  VTE/PE  Warfarin Dose prior to admission: 2.5mg daily (managed by [facility])   Concurrent anticoagulants/antiplatelets: none  Significant Drug Interactions: No obvious interactions  Recent Labs     09/20/24  0505 09/21/24  0514 09/22/24  0631   INR 1.7 1.7 2.2   HGB  --  9.2* 9.5*   PLT  --  443 458*     Recent warfarin administrations                     warfarin (COUMADIN) tablet 7.5 mg (mg) 7.5 mg Given 09/21/24 1804    warfarin (COUMADIN) tablet 7.5 mg (mg) 7.5 mg Given 09/20/24 1832    warfarin (COUMADIN) tablet 5 mg (mg) 5 mg Given 09/19/24 1727                   Date   INR    Dose  9/12       1.7          ?  9/13       1.8         2.5 mg  9/14       1.7          5 mg  9/15       1.7          5 mg  9/16       2.0          none  9/17       2.0          5 mg  9/18       1.5          5 mg  9/19       1.5          5 mg  9/20       1.7          7.5 mg  9/21       1.7          7.5 mg  9/22       2.2    Assessment/Plan  (Goal INR: 2 - 3)  INR therapeutic x 1. Will resume home dose of 2.5mg today. INR in am.      Active problem list reviewed.  INR orders are placed.  Chart reviewed for pertinent labs, drug/diet interactions, and past doses.  Documentation of patient's clinical condition was reviewed.    Pharmacy Dosing:  Pharmacy will continue to follow.      
Warfarin Dosing - Pharmacy Consult Note  Consulting Provider: Reymundo Todd CNP  Indication:  History of  VTE/PE  Warfarin Dose prior to admission: 2.5mg daily (managed by [facility])   Concurrent anticoagulants/antiplatelets: none  Significant Drug Interactions: No obvious interactions  Recent Labs     09/21/24  0514 09/22/24  0631 09/23/24  0508   INR 1.7 2.2 2.2   HGB 9.2* 9.5* 9.3*    458* 413     Recent warfarin administrations                     warfarin (COUMADIN) tablet 2.5 mg (mg) 2.5 mg Given 09/22/24 1658    warfarin (COUMADIN) tablet 7.5 mg (mg) 7.5 mg Given 09/21/24 1804    warfarin (COUMADIN) tablet 7.5 mg (mg) 7.5 mg Given 09/20/24 1832                   Date   INR    Dose  9/12       1.7         ?  9/13       1.8        2.5mg  9/14       1.7        5 mg  9/15       1.7        5 mg  9/16       2.0        none - pt refused  9/17       2.0         5 mg  9/18       1.5         5 mg  9/19       1.5         5 mg  9/20       1.7         7.5 mg  9/21       1.7         7.5 mg  9/22       2.2         2.5 mg  9/23       2.2     Assessment/Plan  (Goal INR: 2 - 3)  INR within goal. Coumadin 5mg today. INR in AM.     Active problem list reviewed.  INR orders are placed.  Chart reviewed for pertinent labs, drug/diet interactions, and past doses.  Documentation of patient's clinical condition was reviewed.    Pharmacy Dosing:  Pharmacy will continue to follow.       
Warfarin Dosing - Pharmacy Consult Note  Consulting Provider: Reymundo Todd CNP  Indication: History of  VTE/PE  Warfarin Dose prior to admission: 2.5 mg daily (managed by [facility])    Concurrent anticoagulants/antiplatelets: none  Significant Drug Interactions: No obvious interactions    Recent Labs     09/12/24  1740 09/12/24  2141 09/13/24  0247   INR  --  1.7 1.8   HGB 10.7*  --  9.1*   *  --  684*      Date   INR    Dose  9/12       1.7         ?  9/13       1.8    Assessment/Plan  (Goal INR: 2 - 3)  INR sub-therapeutic. Unsure if patient had a dose yesterday.   Will give home dose today.  Warfarin 2.5mg today. INR in the morning.    Active problem list reviewed.  INR orders are placed.  Chart reviewed for pertinent labs, drug/diet interactions, and past doses.  Documentation of patient's clinical condition was reviewed.    Pharmacy Dosing:  Pharmacy will continue to follow.       
Warfarin Dosing - Pharmacy Consult Note  Consulting Provider: Reymundo Todd CNP  Indication: History of  VTE/PE  Warfarin Dose prior to admission: 2.5mg daily (managed by [facility])    Concurrent anticoagulants/antiplatelets: none  Significant Drug Interactions: No obvious interactions    Recent Labs     09/17/24  0435 09/18/24  0507 09/19/24  0535   INR 2.0 1.5 1.5        Date   INR    Dose  9/12       1.7          ?  9/13       1.8        2.5mg  9/14       1.7        5 mg  9/15        1.7       5 mg  9/16        2.0       none   9/17        2.0       5 mg  9/18        1.5       5 mg  9/19        1.5    Assessment/Plan  (Goal INR: 2 - 3)  INR is still sub-therapeutic.   Warfarin 5mg today. INR in the morning.    Active problem list reviewed.  INR orders are placed.  Chart reviewed for pertinent labs, drug/diet interactions, and past doses.  Documentation of patient's clinical condition was reviewed.    Pharmacy Dosing:  Pharmacy will continue to follow.      
Warfarin Dosing - Pharmacy Consult Note  Consulting Provider: Reymundo Todd CNP  Indication: History of  VTE/PE  Warfarin Dose prior to admission: 2.5mg daily (managed by [facility])    Concurrent anticoagulants/antiplatelets: none  Significant Drug Interactions: No obvious interactions    Recent Labs     09/14/24  0919 09/15/24  0654 09/16/24  0738   INR 1.7 1.7 2.0   HGB 9.1* 9.8*  --    * 664*  --       Date   INR    Dose  9/12       1.7          ?  9/13       1.8        2.5mg  9/14       1.7        5 mg  9/15       1.7        5 mg  9/16       2.0    Assessment/Plan  (Goal INR: 2 - 3)  INR therapeutic. Resuming home dose.  Warfarin 2.5mg today. INR in the morning.    Active problem list reviewed.  INR orders are placed.  Chart reviewed for pertinent labs, drug/diet interactions, and past doses.  Documentation of patient's clinical condition was reviewed.    Pharmacy Dosing:  Pharmacy will continue to follow.      
Warfarin Dosing - Pharmacy Consult Note  Consulting Provider: Reymundo Todd CNP  Indication: History of  VTE/PE  Warfarin Dose prior to admission: 2.5mg daily (managed by [facility])    Concurrent anticoagulants/antiplatelets: none  Significant Drug Interactions: No obvious interactions    Recent Labs     09/23/24  0508 09/24/24  0623 09/25/24  0504   INR 2.2 2.3 2.2   HGB 9.3* 9.4* 8.8*    440 392     Date   INR    Dose  9/12       1.7          ?  9/13       1.8        2.5mg  9/14       1.7         5 mg  9/15       1.7         5 mg  9/16       2.0        none - pt refused  9/17       2.0         5 mg  9/18       1.5         5 mg  9/19       1.5         5 mg  9/20       1.7        7.5 mg  9/21       1.7        7.5 mg  9/22       2.2        2.5 mg  9/23       2.2         5 mg  9/24       2.3        2.5 mg             9/25       2.2    Assessment/Plan  (Goal INR: 2 - 3)  INR therapeutic. Patient appears to need larger dose than home dose to be in goal range 2-3. He has received 40mg over the past 8 days (average of 5mg/day).  Give warfarin 5mg today. INR in the morning.    Active problem list reviewed.  INR orders are placed.  Chart reviewed for pertinent labs, drug/diet interactions, and past doses.  Documentation of patient's clinical condition was reviewed.    Pharmacy Dosing:  Pharmacy will continue to follow.      
Warfarin Dosing - Pharmacy Consult Note  Consulting Provider: Reymundo Todd CNP  Indication: History of  VTE/PE  Warfarin Dose prior to admission: 2.5mg daily (managed by [facility])    Concurrent anticoagulants/antiplatelets: none  Significant Drug Interactions: No obvious interactions    Recent Labs     09/24/24  0623 09/25/24  0504 09/26/24  0525   INR 2.3 2.2 1.9   HGB 9.4* 8.8* 8.4*    392 389     Recent warfarin administrations                     warfarin (COUMADIN) tablet 5 mg (mg) 5 mg Given 09/25/24 1723    warfarin (COUMADIN) tablet 2.5 mg (mg) 2.5 mg Given 09/24/24 1727    warfarin (COUMADIN) tablet 5 mg (mg) 5 mg Given 09/23/24 1739                   Date   INR    Dose  9/12       1.7          ?  9/13       1.8        2.5mg  9/14       1.7         5 mg  9/15       1.7         5 mg  9/16       2.0        none - pt refused  9/17       2.0         5 mg  9/18       1.5         5 mg  9/19       1.5         5 mg  9/20       1.7        7.5 mg  9/21       1.7        7.5 mg  9/22       2.2        2.5 mg  9/23       2.2         5 mg  9/24       2.3        2.5 mg             9/25       2.2         5 mg  9/26       1.9    Assessment/Plan  (Goal INR: 2 - 3)  INR just slightly below goal.   Patient appears to need larger dose than home dose to be in goal range 2-3. He has received 45mg over the past 9 days (average of 5mg/day).   Warfarin 5mg today. INR in the morning.    Active problem list reviewed.  INR orders are placed.  Chart reviewed for pertinent labs, drug/diet interactions, and past doses.  Documentation of patient's clinical condition was reviewed.    Pharmacy Dosing:  Pharmacy will continue to follow.      
Warfarin Dosing - Pharmacy Consult Note  Consulting Provider: Reymundo Todd CNP  Indication: History of  VTE/PE  Warfarin Dose prior to admission: 2.5mg daily (managed by [facility])    Concurrent anticoagulants/antiplatelets: none  Significant Drug Interactions: No obvious interactions    Recent Labs     09/26/24  0525 09/27/24  0606 09/28/24  0513   INR 1.9 1.6 1.5   HGB 8.4* 8.8*  --     410  --        Date   INR    Dose  9/12       1.7          ?  9/13       1.8         2.5mg  9/14       1.7          5 mg  9/15       1.7          5 mg  9/16       2.0         none- pt refused  9/17       2.0          5 mg  9/18       1.5          5 mg  9/19       1.5          5 mg  9/20       1.7         7.5mg  9/21       1.7         7.5 mg  9/22       2.2         2.5 mg  9/23       2.2          5 mg  9/24       2.3         2.5 mg  9/25       2.2           5 mg  9/26       1.9         none - held for cysto  9/27       1.6         10 mg  9/28       1.5    Assessment/Plan  (Goal INR: 2 - 3)  INR sub-therapeutic. Will give slightly boosted dose today.  Warfarin 7.5 mg today. INR in the morning.    Active problem list reviewed.  INR orders are placed.  Chart reviewed for pertinent labs, drug/diet interactions, and past doses.  Documentation of patient's clinical condition was reviewed.    Pharmacy Dosing:  Pharmacy will continue to follow.      
Warfarin Dosing - Pharmacy Consult Note  Consulting Provider: Reymundo Todd CNP  Indication: History of  VTE/PE  Warfarin Dose prior to admission: 2.5mg daily (managed by [facility])    Concurrent anticoagulants/antiplatelets: none  Significant Drug Interactions: No obvious interactions    Recent Labs     09/27/24  0606 09/28/24  0513 09/29/24  0519   INR 1.6 1.5 2.1   HGB 8.8*  --   --      --   --       Date   INR    Dose  9/12       1.7          ?  9/13       1.8         2.5mg  9/14       1.7          5 mg  9/15       1.7          5 mg  9/16       2.0         none- pt refused  9/17       2.0          5 mg  9/18       1.5          5 mg  9/19       1.5          5 mg  9/20       1.7         7.5mg  9/21       1.7         7.5 mg  9/22       2.2         2.5 mg  9/23       2.2          5 mg  9/24       2.3         2.5 mg  9/25       2.2           5 mg  9/26       1.9         none - held for cysto  9/27       1.6         10 mg  9/28       1.5         7.5 mg  9/29       2.1    Assessment/Plan  (Goal INR: 2 - 3)  INR in therapeutic range. Home dose is too low to keep INR therapeutic.  Warfarin 5mg today. INR in the morning.    Active problem list reviewed.  INR orders are placed.  Chart reviewed for pertinent labs, drug/diet interactions, and past doses.  Documentation of patient's clinical condition was reviewed.    Pharmacy Dosing:  Pharmacy will continue to follow.      
Writer talked with  over the phone. Pt is scheduled for a cystoscopy and dailey placement tomorrow morning. Pt can be NPO after 4 am.  
2-week course of oral fluconazole.  Patient also tested positive for C. difficile diarrhea 2024.  Patient completed a 10-day course of oral vancomycin at that time.     Patient now presented to [facility] emergency department with sensation of constipation and difficulty urinating since 2024.  Patient reports taking Senokot at home due to feeling constipated and reports some loose stools but feels as though he has more stool that needs to come out.  In the emergency department CT abdomen and pelvis showed no evidence of bowel obstruction or constipation/impaction.  Did show urothelial thickening and enhancement involving the left renal pelvis and proximal ureter.  It also showed bilateral lower lobe opacities.  Urinalysis with 5200 WBCs, moderate leuk esterase.  Patient was started on cefepime due to concern for urinary tract infection.  Patient is also been started on vancomycin due to a history of MRSA.  Patient had received an enema last night due to complaints of constipation, patient is now having large amounts of liquid stools.  We were consulted due to the patient's recent urinary tract infection.       Current evaluation:2024    /68   Pulse 73   Temp 97.5 °F (36.4 °C) (Oral)   Resp 17   Ht 1.854 m (6' 1\")   Wt 62.3 kg (137 lb 6.4 oz)   SpO2 100%   BMI 18.13 kg/m²     Temperature Range: Temp: 97.5 °F (36.4 °C) Temp  Av.1 °F (36.7 °C)  Min: 97.5 °F (36.4 °C)  Max: 98.5 °F (36.9 °C)    Patient seen lying in bed, sleeping , no signs of distress  RN states patient had been having diarrhea yesterday and last night but has not yet this morning    Review of Systems   Unable to perform ROS: Other       Physical Examination :     Physical Exam  Constitutional:       General: He is not in acute distress.     Appearance: Normal appearance. He is normal weight.   HENT:      Head: Normocephalic and atraumatic.   Pulmonary:      Effort: Pulmonary effort is normal. No respiratory distress. 
Closed fracture of humerus    DM type 2 with diabetic peripheral neuropathy (HCC)    COPD without exacerbation (HCC)    HLD (hyperlipidemia)    Gastroesophageal reflux disease without esophagitis    Chronic pain syndrome    Marijuana use    History of colon polyps    Functional diarrhea    Sepsis due to methicillin resistant Staphylococcus aureus (MRSA) (HCC)    History of esophageal cancer    Osteomyelitis, chronic, ankle or foot (HCC)    PAD (peripheral artery disease) (HCC)    Other pulmonary embolism without acute cor pulmonale (HCC)    Carotid stenosis, asymptomatic, bilateral    Lower limb amputation status    Fx humeral neck, right, closed, initial encounter    Transient hypotension    Constipation due to opioid therapy    Acute kidney injury superimposed on CKD (HCC)    Complication of below knee amputation stump (HCC)    COPD exacerbation (HCC)    Hyponatremia    Pain, phantom limb (HCC)    S/P BKA (below knee amputation) bilateral (HCC)    Hyperglycemia    Moderate protein malnutrition (HCC)    Essential hypertension    Uncontrolled type 2 diabetes mellitus with hyperglycemia (HCC)    History of pulmonary embolism - 2017    Atelectasis    Uncontrolled type 2 diabetes mellitus with foot ulcer    Azotemia    Anemia, normocytic normochromic    Drug-induced constipation    Osteomyelitis of metatarsals of left foot (HCC)    Diabetic foot infection (HCC)    Noncompliance    Type 1 diabetes mellitus with diabetic foot infection (HCC)    Acute osteomyelitis of left foot (HCC)    Cellulitis of left foot    Mild malnutrition (HCC)    Hypocalcemia    Hypokalemia    Moderate malnutrition (HCC)    History of below knee amputation, right (HCC)    Foot ulcer with fat layer exposed, left (HCC)    Chronic refractory osteomyelitis of left foot (HCC)    Sepsis (HCC)    Pyogenic inflammation of bone (HCC)    Cellulitis of left lower extremity    History of below knee amputation, left (HCC)    Leg ulcer, left, with fat layer 
\"BACTERIA\", \"CLARITYU\", \"SPECGRAV\", \"LEUKOCYTESUR\", \"UROBILINOGEN\", \"BILIRUBINUR\", \"BLOODU\" in the last 72 hours.    Invalid input(s): \"NITRATE\", \"GLUCOSEUKETONESUAMORPHOUS\"    Additional Lab/culture results:    Physical Exam: Patient has severe meatal stenosis bladder scan catching more fluid because of significant liquidy stool in the sigmoid    Interval Imaging Findings:    Impression: Meatal stenosis urinary retention  Patient Active Problem List   Diagnosis    Type 2 diabetes mellitus with diabetic polyneuropathy, with long-term current use of insulin ([facility])    Neuropathic pain, leg    Diabetic polyneuropathy ([facility])    Allergic rhinitis    Tobacco dependence    Edentulous    Dysphagia    Lung nodules    Esophageal cancer ([facility])    Fracture of humerus, left, closed    Closed fracture of humerus    DM type 2 with diabetic peripheral neuropathy ([facility])    COPD without exacerbation ([facility])    HLD (hyperlipidemia)    Gastroesophageal reflux disease without esophagitis    Chronic pain syndrome    Marijuana use    History of colon polyps    Functional diarrhea    Sepsis due to methicillin resistant Staphylococcus aureus (MRSA) ([facility])    History of esophageal cancer    Osteomyelitis, chronic, ankle or foot ([facility])    PAD (peripheral artery disease) ([facility])    Other pulmonary embolism without acute cor pulmonale ([facility])    Carotid stenosis, asymptomatic, bilateral    Lower limb amputation status    Fx humeral neck, right, closed, initial encounter    Transient hypotension    Constipation due to opioid therapy    Acute kidney injury superimposed on CKD ([facility])    Complication of below knee amputation stump ([facility])    COPD exacerbation ([facility])    Hyponatremia    Pain, phantom limb ([facility])    S/P BKA (below knee amputation) bilateral ([facility])    Hyperglycemia    Moderate protein malnutrition (HCC)    Essential hypertension    Uncontrolled type 2 diabetes mellitus with hyperglycemia ([facility])    History of pulmonary embolism - 2017    Atelectasis    
dependence    Edentulous    Dysphagia    Lung nodules    Esophageal cancer (HCC)    Fracture of humerus, left, closed    Closed fracture of humerus    DM type 2 with diabetic peripheral neuropathy (HCC)    COPD without exacerbation (HCC)    HLD (hyperlipidemia)    Gastroesophageal reflux disease without esophagitis    Chronic pain syndrome    Marijuana use    History of colon polyps    Functional diarrhea    Sepsis due to methicillin resistant Staphylococcus aureus (MRSA) (HCC)    History of esophageal cancer    Osteomyelitis, chronic, ankle or foot (HCC)    PAD (peripheral artery disease) (HCC)    Other pulmonary embolism without acute cor pulmonale (HCC)    Carotid stenosis, asymptomatic, bilateral    Lower limb amputation status    Fx humeral neck, right, closed, initial encounter    Transient hypotension    Constipation due to opioid therapy    Acute kidney injury superimposed on CKD (HCC)    Complication of below knee amputation stump (HCC)    COPD exacerbation (HCC)    Hyponatremia    Pain, phantom limb (HCC)    S/P BKA (below knee amputation) bilateral (HCC)    Hyperglycemia    Moderate protein malnutrition (HCC)    Essential hypertension    Uncontrolled type 2 diabetes mellitus with hyperglycemia (HCC)    History of pulmonary embolism - 2017    Atelectasis    Uncontrolled type 2 diabetes mellitus with foot ulcer    Azotemia    Anemia, normocytic normochromic    Drug-induced constipation    Osteomyelitis of metatarsals of left foot (HCC)    Diabetic foot infection (HCC)    Noncompliance    Type 1 diabetes mellitus with diabetic foot infection (HCC)    Acute osteomyelitis of left foot (HCC)    Cellulitis of left foot    Mild malnutrition (HCC)    Hypocalcemia    Hypokalemia    Moderate malnutrition (HCC)    History of below knee amputation, right (HCC)    Foot ulcer with fat layer exposed, left (HCC)    Chronic refractory osteomyelitis of left foot (HCC)    Sepsis (HCC)    Pyogenic inflammation of bone (HCC) 
repeat lower endoscopy a this time.   Following.     Will ask Dr. Stallworth if role for CRS due to incontinence.       Patricia Mcrae, PRAFUL  Discussed with Dr. Stallworth.      Patient seen and examined.  Patient has positive C. difficile infection, C. difficile toxin PCR positive on Dificid.  His biggest complaint at this point is tenesmus.  He feels as if he needs to have a bowel movement, but simply cannot \"push it out\".  He is having a lot of anal/rectal spasm.  He had 3 loose bowel movements yesterday.  He is trying to have a bowel movement today.  He complains of chronic pain and asking for narcotics    Abdomen is flat and soft.  Bowel sounds present    I have discontinued hyoscyamine and dicyclomine.  Will try B&O suppositories to help treat rectal spasm.  Continue Dificid as ordered    DO BERTA MarquezL[facility]  [address] [place] [address]  [phone]            
137/58   Pulse (!) 104   Temp 98.3 °F (36.8 °C) (Oral)   Resp 24   Ht 1.854 m (6' 1\")   Wt 54.7 kg (120 lb 9.5 oz)   SpO2 94%   BMI 15.91 kg/m²      Physical Exam:   General appearance: Alert, distressed appearing.  Lungs: unlabored pattern  Abdomen: Soft, NT, ND +BS, no masses      Lab and Imaging Review   Recent Labs     09/12/24  1740 09/12/24  2141 09/13/24  0247 09/14/24  0919   WBC 20.4*  --  16.5* 12.1*   HGB 10.7*  --  9.1* 9.1*   MCV 94.0  --  96.2 95.3   *  --  684* 686*   INR  --  1.7 1.8 1.7   *  --  135 140   K 4.9  --  4.3 3.9     --  108* 110*   CO2 14*  --  14* 17*   BUN 30*  --  27* 20   CREATININE 1.6*  --  1.5* 1.1   GLUCOSE 178*  --  178* 151*   CALCIUM 9.4  --  8.6 9.3   AST 10  --   --   --    ALT 8  --   --   --    ALKPHOS 159*  --   --   --    BILITOT 0.1*  --   --   --    LIPASE 52  --   --   --      Recent Labs     09/12/24 2141 09/13/24  0247 09/14/24  0919   INR 1.7 1.8 1.7   PROTIME 20.0* 21.1* 19.7*         Impression:    Chronic diarrhea.  Suspect multifactorial.  Extensively worked up in past.  See above copied notes from [facility].  Noted chronic pancreatitis but says he didn't respond to pancreatic enzymes.  C.diff, FMT last year reported.  Stool studies pending.  On Vanco and ID on case.      PLAN:    Await stool studies.  Supportive care with fluids, Bentyl, antiemetics.  If C.diff negative, recommend colonoscopy with biopsies.  Discussed with nurse.      Neil Hewitt D.O.      
4.4 4.7 4.9   * 110* 109*   CO2 22 21 21   GLUCOSE 105* 137* 104*   BUN 20 25* 27*   CREATININE 1.0 1.0 1.0   MG 2.0  --   --    ANIONGAP 8* 9 9   LABGLOM 83 83 83   CALCIUM 8.8 8.4* 8.3*     Last 3 Troponin:    Lab Results   Component Value Date/Time    TROPONINI <0.01 09/03/2012 06:10 AM    TROPONINI <0.01 09/03/2012 01:50 AM    TROPONINI 0.03 09/02/2012 11:55 PM    TROPONINI <0.01 05/02/2012 04:32 PM    TROPONINI <0.01 05/02/2012 01:42 PM    TROPONINI 0.02 09/08/2011 02:03 AM       IRON:    Lab Results   Component Value Date/Time    IRON 23 05/25/2021 10:12 AM       TIBC:    Lab Results   Component Value Date/Time    TIBC 328 05/25/2021 10:12 AM     FERRITIN:    Lab Results   Component Value Date/Time    FERRITIN 56 02/09/2021 06:19 AM     GAVIN:   Lab Results   Component Value Date/Time    GAVIN POSITIVE 10/21/2016 12:09 PM       C3:  Lab Results   Component Value Date/Time    C3 138 10/22/2016 07:46 AM     C4:  Lab Results   Component Value Date/Time    C4 27 10/22/2016 07:46 AM     Phosphorus:    Lab Results   Component Value Date/Time    PHOS 3.5 09/15/2024 06:54 AM     Magnesium:   Lab Results   Component Value Date/Time    MG 2.0 09/16/2024 07:38 AM     MPO ANCA:    Lab Results   Component Value Date/Time    MPO 25 10/21/2016 12:09 PM     PR3 ANCA:    Lab Results   Component Value Date/Time    PR3 9 10/21/2016 12:09 PM       Urine Protein:    Lab Results   Component Value Date/Time    PROTEINU 1+ 09/12/2024 08:45 PM       Radiology:  XR CHEST PORTABLE    Result Date: 9/12/2024  EXAMINATION: ONE XRAY VIEW OF THE CHEST 9/12/2024 8:12 pm COMPARISON: September 6, 2022 HISTORY: ORDERING SYSTEM PROVIDED HISTORY: possible RLL pneumonia on CT TECHNOLOGIST PROVIDED HISTORY: possible RLL pneumonia on CT Reason for Exam: possible RLL pneumonia on CT FINDINGS: Increased interstitial markings at the lung bases.  Heart and mediastinum normal.  Hardware left shoulder.     Possible mild bibasilar interstitial infiltrates 
lesions. Skin turgor normal.  CNS: Oriented to person, place and time. Speech clear. Face symmetrical. No tremor.     Data:  CBC:   Lab Results   Component Value Date    WBC 12.7 (H) 09/25/2024    HGB 8.8 (L) 09/25/2024    HCT 28.7 (L) 09/25/2024    MCV 94.4 09/25/2024     09/25/2024     BMP:    Lab Results   Component Value Date     09/24/2024    K 4.4 09/24/2024     09/24/2024    CO2 23 09/24/2024    BUN 31 (H) 09/24/2024    CREATININE 1.3 (H) 09/24/2024    GLUCOSE 106 (H) 09/24/2024     CMP:   Lab Results   Component Value Date     09/24/2024    K 4.4 09/24/2024     09/24/2024    CO2 23 09/24/2024    BUN 31 (H) 09/24/2024    CREATININE 1.3 (H) 09/24/2024    GLUCOSE 106 (H) 09/24/2024    CALCIUM 8.5 (L) 09/24/2024    BILITOT 0.1 (L) 09/12/2024    ALKPHOS 159 (H) 09/12/2024    AST 10 09/12/2024    ALT 8 09/12/2024      Hepatic:   Lab Results   Component Value Date    AST 10 09/12/2024    ALT 8 09/12/2024    BILITOT 0.1 (L) 09/12/2024    ALKPHOS 159 (H) 09/12/2024     BNP: No results found for: \"BNP\"  Lipids:   Lab Results   Component Value Date    CHOL 174 06/24/2018    HDL 49 06/24/2018     INR:   Lab Results   Component Value Date    INR 2.2 09/25/2024     PTH: No results found for: \"PTH\"  Phosphorus:    Lab Results   Component Value Date    PHOS 3.5 09/15/2024     Ionized Calcium: No components found for: \"IONCA\"  Magnesium:   Lab Results   Component Value Date    MG 2.0 09/16/2024     Albumin: No results found for: \"LABALBU\"  Last 3 CK, CKMB, Troponin: @LABRCNT(CKTOTAL:3,CKMB:3,TROPONINI:3)       URINE:)  Lab Results   Component Value Date    NAUR 97 09/15/2024       Radiology:   Reviewed.    Assessment:  Acute kidney injury  Diarrhea  Prerenal azotemia  Chronic indwelling Quesada  Urinary retention and urethral stricture  Anemia  Chronic diarrhea    Plan: Continue lactobacillus  Monitor renal function  Sliding scale electrolyte replacement  Continue present IV  Avoid hypotension, 
renal pelvis and proximal left ureter, bilateral nonobstructive nephrolithiasis, large hiatal hernia, small right pleural effusion and tree-in-bud opacities to bilateral lung bases with mucous plugging to right lower lobe.     He was given a fluid liter bolus, Rocephin and azithromycin.  Patient endorsed desire to discharge to SNF requesting hospital admission.  We were then asked to admit the patient to medicine service. During my initial interview in the emergency department, patient was churlish, yelling and using profanity to myself and to bedside nurse. I attempted to inquire regarding Quesada catheter that was placed from recent T[facility] discharge as well as risk and benefits for indwelling Quesada considering acute urinary retention and inflammation on CT scan. Patient became visibly upset stating \"Do you know what the risks are for being overweight like you? You could have a heart attack and die.\"  Patient was educated by multiple staff members, including security, that inappropriate behavior will not be tolerated.      Review of Systems:     Constitutional: Denies chills, denies fevers, sweats  Respiratory:  negative for cough, dyspnea on exertion,   Cardiovascular:  negative for chest pain, chest pressure/discomfort, lower extremity edema, palpitations  Gastrointestinal: + for cramping abdominal pain,+ bloating and fullness of abdomen, had 2 large stools early morning, denies nausea, denies vomiting  Neurological:  negative for dizziness, headache    Medications:     Allergies:    Allergies   Allergen Reactions    Ativan [Lorazepam] Anaphylaxis    Gabapentin Other (See Comments)     dizziness    Other        Current Meds:   Scheduled Meds:    warfarin  2.5 mg Oral Once    tamsulosin  0.4 mg Oral BID    Fidaxomicin  200 mg Oral BID    dicyclomine  20 mg Oral 4x Daily AC & HS    sodium chloride flush  5-40 mL IntraVENous 2 times per day    insulin glargine  18 Units SubCUTAneous Daily    insulin lispro  0-4 Units 
time;Contact-guard assistance -min A with increased verbal cues for patient safety  Interventions: Safety awareness training;Tactile cues;Verbal cues  Comments: HOB flat; HOB height higher than w/c height; patient declined donning prosthetic due to LE discomfort and states my LE is still \"healing\"; Therapist set up w/c at patient bedside;  patient impulsive to complete sit pivot transfer; Patient utilized UE to complete sit pivot transfer; Required cues to slow down for patient safety and required TD With line management;    Gait  Gait Training: No (unsafe to attempt gait training this date)     PT Exercises  Static sit: Sat EOB unsupported with UE support  Pressure Relief Exercises: Educated on weight shifting.  Pt is at risk for skin breakdown.  Pt educated on pressure relief measures. Pt reports being comfortable at end of treatment.           Safety Devices  Type of Devices: Patient at risk for falls;Nurse notified;All ivory prominences offloaded;Call light within reach;Left in bed (pt declined gait belt)  Restraints  Restraints Initially in Place: No      Goals  Short Term Goals  Time Frame for Short Term Goals: 12 visits  Short Term Goal 1: Patient will be indep with bed mobility.  Short Term Goal 2: Pt. to transfer bed <> BSC / W/C SBA with Rt. LE prosthetic leg  Short Term Goal 3: Patient will tolerate 30 minutes of ther-ex and ther-act.  Short Term Goal 4: Patient will have good dynamic seated balance.  Patient Goals   Patient Goals : Improve transfers    Education  Patient Education  Education Given To: Patient  Education Provided: Role of Therapy;Plan of Care;Home Exercise Program  Verbal edu provided regarding fall prevention in the areas of community safety, transportation, proper footwear and clothing, reducing risk of falls, environmental modifications, importance of exercise, consequences of falling, plan if a fall occurs (\"rest and wait\" vs \"up and about\").   Education Method: Verbal  Education 
ulcer, left, with fat layer exposed (HCC)    S/P amputation    Tobacco abuse    Pneumonia of right lower lobe due to infectious organism    Abdominal pain    Cannabis abuse    Parapneumonic effusion    Hiatus hernia -large    Metabolic encephalopathy    Altered mental status    Hyperkalemia    Alcoholic intoxication without complication (HCC)    Acute encephalopathy    Depression    History of Clostridioides difficile colitis    Bilateral kidney stones    Ureteric stone    Failure to thrive in adult    Wound of left lower extremity    Chronic diarrhea    Leg ulcer, left, limited to breakdown of skin (HCC)    Urinary retention    Acute cystitis without hematuria    Secondary hypercoagulable state (HCC)    Chronic pancreatitis (HCC)    Urinary tract infection without hematuria    Dysuria    Clostridioides difficile infection       Plan:  agree with discharge planning follow-up visit in the office in 10 days for catheter removal and cystoscopy    Nicolas Gao MD  7:16 AM 9/23/2024  
    Lab Results   Component Value Date    SEDRATE 35 (H) 02/26/2024         Lab Results   Component Value Date/Time    DDIMER 0.39 06/29/2020 09:15 PM    DDIMER 1.63 12/20/2017 08:15 PM    DDIMER 0.68 12/25/2011 04:01 PM     Lab Results   Component Value Date/Time    FERRITIN 56 02/09/2021 06:19 AM    FERRITIN 64 01/25/2014 12:35 PM     No results found for: \"LDH\"  No results found for: \"FIBRINOGEN\"    No results found for requested labs within last 30 days.     Lab Results   Component Value Date/Time    COVID19 Not Detected 12/12/2023 08:29 PM    COVID19 Not Detected 11/03/2021 08:50 PM    COVID19 Not Detected 09/02/2021 11:50 AM    COVID19 Not Detected 08/23/2020 11:02 AM       No results for input(s): \"VANCOTROUGH\" in the last 72 hours.    Imaging Studies:   ONE XRAY VIEW OF THE CHEST UPRIGHT AP VIEW OF CHEST  9/14/2024 4:56 am   IMPRESSION:  1. No acute cardiopulmonary disease.  2. Small to moderate-sized hiatal hernia.  3. Mild atelectatic changes and/or fibrotic changes at the lung bases, left  greater than right, unchanged.              Exam Ended: 09/14/24 05:51 EDT Last Resulted: 09/14/24 08:55 EDT        Cultures:   Culture and Sensitivities:  No results for input(s): \"SPECDESC\", \"SPECIAL\", \"CULTURE\", \"STATUS\", \"ORG\", \"CDIFFTOXPCR\", \"CAMPYLOBPCR\", \"SALMONELLAPC\", \"SHIGAPCR\", \"SHIGELLAPCR\" in the last 72 hours.    Procedure Component Value Units Date/Time   C DIFF TOXIN/ANTIGEN [[id number]] Collected: 09/20/24 1845   Order Status: Completed Specimen: Stool Updated: 09/21/24 0705    Specimen Description .FECES    C DIFF AG + TOXIN NEGATIVE    Comment: No C. difficile antigen and Toxin Detected.      C DIFF TOXIN/ANTIGEN [[id number]] Collected: 09/15/24 0914   Order Status: Completed Specimen: Stool Updated: 09/16/24 0851    Specimen Description .FECES    C DIFF AG + TOXIN NEGATIVE    Comment: No C. difficile antigen and Toxin Detected.      C DIFF TOXIN/ANTIGEN [[id number]] (Abnormal) Collected: 09/13/24 
Access: Level entry  Bathroom Shower/Tub: Tub/Shower unit (does sponge bath only)  Bathroom Toilet: Handicap height  Bathroom Equipment: Tub transfer bench, Commode (uses BSC, does not use toilet)  Bathroom Accessibility: Wheelchair accessible  Home Equipment: Wheelchair - Manual  Has the patient had two or more falls in the past year or any fall with injury in the past year?: Yes  Receives Help From:  (friend)  ADL Assistance: Independent  Homemaking Assistance: Needs assistance  Ambulation Assistance: Non-ambulatory (uses w/c)  Transfer Assistance: Needs assistance (sit pivot transfers with RLE prosthesis)  Active : No  Patient's  Info: friend drives  Occupation: Retired  Leisure & Hobbies: TV  Vision/Hearing  Vision  Vision: Impaired  Hearing  Hearing: Within functional limits    Cognition   Orientation  Overall Orientation Status: Within Functional Limits  Cognition  Overall Cognitive Status: Exceptions  Arousal/Alertness: Appears intact  Following Commands: Appears intact  Attention Span: Appears intact  Memory: Decreased recall of recent events;Decreased short term memory  Safety Judgement: Decreased awareness of need for assistance;Decreased awareness of need for safety  Problem Solving: Assistance required to generate solutions;Assistance required to implement solutions;Assistance required to identify errors made  Insights: Decreased awareness of deficits  Initiation: Requires cues for some  Sequencing: Does not require cues    Objective (Vitals remained stable throughout visit)  Pulse: 64  Respirations: 22  SpO2: 97 %  O2 Device: None (Room air)     Observation/Palpation  Observation: Patient with bilateral BKAs, reports he has RLE prosthesis. Right hand / finger flexion contractures (see OT note), with wound fingers of right hand, one finger amputated at distal end.       AROM RLE (degrees)  RLE General AROM: Hip and knee WFL  AROM LLE (degrees)  LLE General AROM: Hip and knee WFL  AROM RUE 
Verbal  Barriers to Learning:  (Decreased receptiveness of education)  Education Outcome: Continued education needed      Therapy Time   Individual Concurrent Group Co-treatment   Time In 1132         Time Out 1150         Minutes 18         Timed Code Treatment Minutes: 18 Minutes       Poonam Clifford, PT         
disorders of kidney and ureter in diseases classified elsewhere     Pneumonia     Pyogenic inflammation of bone ([facility]) 2/7/2021    Seizures ([facility])     Sepsis ([facility]) 2/3/2021    Sepsis due to methicillin resistant Staphylococcus aureus ([facility]) 04/12/2021    Thyroid disease     Tobacco abuse       Allergies:   Allergies   Allergen Reactions    Ativan [Lorazepam] Anaphylaxis    Gabapentin Other (See Comments)     dizziness    Other       Medications :  warfarin, 2.5 mg, Oral, Once  QUEtiapine, 50 mg, Oral, Nightly  Vitamin D, 2,000 Units, Oral, Daily  tamsulosin, 0.4 mg, Oral, BID  Fidaxomicin, 200 mg, Oral, BID  sodium chloride flush, 5-40 mL, IntraVENous, 2 times per day  insulin glargine, 18 Units, SubCUTAneous, Daily  insulin lispro, 0-4 Units, SubCUTAneous, TID WC  insulin lispro, 0-4 Units, SubCUTAneous, Nightly  cholestyramine light, 4 g, Oral, Daily  warfarin placeholder: dosing by pharmacy, , Other, RX Placeholder  guaiFENesin, 600 mg, Oral, BID  sodium bicarbonate, 1,300 mg, Oral, BID  lipase-protease-amylase, 20,000 Units, Oral, TID WC   And  lipase-protease-amylas, 5,000 Units, Oral, TID WC        Review of Systems   Review of Systems   Constitutional:  Negative for activity change, appetite change, fatigue, fever and unexpected weight change.   HENT:  Negative for congestion, nosebleeds, rhinorrhea, sinus pressure, sneezing and voice change.    Respiratory:  Negative for cough, choking, chest tightness, shortness of breath and wheezing.    Cardiovascular:  Negative for chest pain, palpitations and leg swelling.   Gastrointestinal:  Positive for abdominal pain. Negative for constipation, nausea and vomiting.   Genitourinary:  Negative for difficulty urinating, dysuria, frequency, penile discharge and testicular pain.   Musculoskeletal:  Positive for back pain.   Skin:  Negative for rash.   Neurological:  Negative for dizziness, weakness, light-headedness and headaches.   Hematological:  Does not bruise/bleed 
mouthwash and cup for disposal of mouthwash while seated at edge of bed.  UE Bathing Skilled Clinical Factors: assist to rub lotion onto back. Pt declined to complete on his own.    Functional Mobility Skilled Clinical Factors: Pt only agreeable to sitting EOB this session. Pt is able to do so I'ly/Supervision only and is able to sit for ~10+ min. Pt engaging in simple grooming tasks with some encouragement. Pt did c/o pain in sitting in bottom so using UEs to off load pressure on sore areas.  Pt declined wanted to trial donning prosthetic at this time d/t pain in residual limb. Therefore, not able to reassess functional transfers this session. Additionally, attempting to ed pt on UE ex while seated. Pt stating he \"does this already\"  Additional Comments: ADLs limited by dec activity tolerance, weakness, and not being able to don prosthetic leg due to pain  Skin Care: Lotion     Activity Tolerance  Activity Tolerance: Patient tolerated treatment well    Bed mobility  Rolling to Left: Supervision  Rolling to Right: Supervision  Supine to Sit: Supervision  Sit to Supine: Supervision  Scooting: Supervision  Bed Mobility Comments: Patient required use of bed rails for bed mobility.    Transfers  Stand Pivot Transfers: Unable to assess  Sit Pivot Transfers: Unable to assess  Vision  Vision: Impaired  Hearing  Hearing: Within functional limits    Cognition  Overall Cognitive Status: Exceptions  Arousal/Alertness: Appears intact  Following Commands: Appears intact  Attention Span: Appears intact  Memory: Decreased recall of recent events;Decreased short term memory  Safety Judgement: Decreased awareness of need for assistance;Decreased awareness of need for safety  Problem Solving: Assistance required to generate solutions;Assistance required to implement solutions;Assistance required to identify errors made  Insights: Decreased awareness of deficits  Initiation: Requires cues for some  Sequencing: Does not require 
on call nephrologist if iv contrast is needed    Please do not hesitate to call with questions. We will follow with you.      Electronically signed by Ervin Joy MD  on 9/23/2024 at 10:33 AM  
02/19/2022 07:07 PM     Lab Results   Component Value Date/Time    SPECIAL RW 6ML 09/12/2024 10:57 PM     Lab Results   Component Value Date/Time    CULTURE NO GROWTH 5 DAYS 09/12/2024 10:57 PM       Radiology:  No results found.    Physical Examination:        General appearance:  alert, cooperative and no distress  Mental Status:  oriented to person, place and time and normal affect  Lungs:  clear to auscultation bilaterally, normal effort  Heart:  regular rate and rhythm, no murmur  Abdomen:  soft, nontender, nondistended, normal bowel sounds, no masses, hepatomegaly, splenomegaly  Extremities: Bilateral BKA.  No edema, redness, tenderness in the calves  Skin:  no gross lesions, rashes, induration    Assessment:        Hospital Problems             Last Modified POA    * (Principal) Sepsis ([facility]) 9/12/2024 Yes    DM type 2 with diabetic peripheral neuropathy ([facility]) 9/12/2024 Yes    COPD without exacerbation ([facility]) 9/12/2024 Yes    Acute kidney injury superimposed on CKD ([facility]) 9/12/2024 Yes    S/P BKA (below knee amputation) bilateral ([facility]) 9/12/2024 Yes    Urinary retention 9/12/2024 Yes    Acute cystitis without hematuria 9/12/2024 Yes    Secondary hypercoagulable state ([facility]) 9/13/2024 Yes    Chronic pancreatitis ([facility]) 9/13/2024 Yes    Urinary tract infection without hematuria 9/13/2024 Yes    Dysuria 9/18/2024 Yes    Clostridioides difficile infection 9/21/2024 Yes    Diarrhea 9/25/2024 Yes       Plan:        C. difficile colitis-s/p dficid  LUMA?-History of CKD 3.  Nephrology following.  Continue fluids and avoid nephrotoxins.  Type 2 diabetes-continue home insulin regimen.  SSI..  Anxiety/depression-continue medications  Urinary retention-s/p Quesada catheter.  Continue Flomax.  Follow-up with urology outpatient in 1 to 2 weeks. S/p inpatient cystoscopy   History of PE and DVT-continue on warfarin  COPD-not in acute exacerbation.  Continue home inhalers and DuoNebs as needed.  UTI-ruled out.  ID following.  
Collected: 09/13/24 1000   Order Status: Completed Specimen: Stool Updated: 09/14/24 1201    Specimen Description .FECES    C DIFF AG + TOXIN INDETERMINATE: Reflex testing to molecular method Abnormal    Gastrointestinal Panel, Molecular [[id number]] Collected: 09/13/24 1000   Order Status: Completed Specimen: Stool Updated: 09/16/24 0850    Specimen Description .FECES    Campylobacter PCR NEGATIVE: No Campylobacter spp. (jejuni or coli) DNA Detected    Salmonella PCR NEGATIVE: No Salmonella spp. DNA Detected    Shigatoxin Gene PCR NEGATIVE: No Shiga toxin-producing gene(s) Detected    Shigella Sp PCR NEGATIVE: No Shigella spp. / EIEC DNA Detected    Plesiomonas Shigelloides PCR NEGATIVE: No Plesionomas shigelloides DNA Detected    Vibrio PCR NEGATIVE: No Vibrio (V. vulnificus, V, parahaemolyticus and V. cholerae) DNA Detected    E Coli Enterotoxigenic PCR NEGATIVE: No Enterotoxigenic E. coli (ETEC) Heat-labile and heat-stable (LT/ST) DNA Detected    Yersinia Enterocolitica PCR NEGATIVE: No Yersinia enterocolitica DNA Detected   C. difficile toxin Molecular [[id number]] (Abnormal) Collected: 09/13/24 1000   Order Status: Completed Specimen: Stool Updated: 09/14/24 1201    Specimen Description --    .FECES  .FECES    C Difficile tox, pcr POSITIVE: C difficile tcdB nucleic acid detected by RT-PCR. Abnormal    Culture, Respiratory [[id number]]    Order Status: No result Specimen: Sputum Expectorated    Culture, Blood 1 [[id number]] Collected: 09/12/24 2257   Order Status: Completed Specimen: Blood Updated: 09/18/24 0007    Specimen Description .BLOOD    Special Requests RW 6ML    Culture NO GROWTH 5 DAYS   Culture, Blood 1 [[id number]] Collected: 09/12/24 2249   Order Status: Completed Specimen: Blood Updated: 09/18/24 0004    Specimen Description .BLOOD    Special Requests RW 10ML    Culture NO GROWTH 5 DAYS   Culture, Urine [[id number]] Collected: 09/12/24 2045   Order Status: Completed Specimen: Urine, clean catch 
DAYS   Culture, Blood 1 [[id number]] Collected: 09/12/24 2249   Order Status: Completed Specimen: Blood Updated: 09/18/24 0004    Specimen Description .BLOOD    Special Requests RW 10ML    Culture NO GROWTH 5 DAYS   C DIFF TOXIN/ANTIGEN [[id number]] Collected: 09/15/24 0914   Order Status: Completed Specimen: Stool Updated: 09/16/24 0851    Specimen Description .FECES    C DIFF AG + TOXIN NEGATIVE    Comment: No C. difficile antigen and Toxin Detected.      Gastrointestinal Panel, Molecular [[id number]] Collected: 09/13/24 1000   Order Status: Completed Specimen: Stool Updated: 09/16/24 0850    Specimen Description .FECES    Campylobacter PCR NEGATIVE: No Campylobacter spp. (jejuni or coli) DNA Detected    Salmonella PCR NEGATIVE: No Salmonella spp. DNA Detected    Shigatoxin Gene PCR NEGATIVE: No Shiga toxin-producing gene(s) Detected    Shigella Sp PCR NEGATIVE: No Shigella spp. / EIEC DNA Detected    Plesiomonas Shigelloides PCR NEGATIVE: No Plesionomas shigelloides DNA Detected    Vibrio PCR NEGATIVE: No Vibrio (V. vulnificus, V, parahaemolyticus and V. cholerae) DNA Detected    E Coli Enterotoxigenic PCR NEGATIVE: No Enterotoxigenic E. coli (ETEC) Heat-labile and heat-stable (LT/ST) DNA Detected    Yersinia Enterocolitica PCR NEGATIVE: No Yersinia enterocolitica DNA Detected   C DIFF TOXIN/ANTIGEN [[id number]] (Abnormal) Collected: 09/13/24 1000   Order Status: Completed Specimen: Stool Updated: 09/14/24 1201    Specimen Description .FECES    C DIFF AG + TOXIN INDETERMINATE: Reflex testing to molecular method Abnormal    C. difficile toxin Molecular [[id number]] (Abnormal) Collected: 09/13/24 1000   Order Status: Completed Specimen: Stool Updated: 09/14/24 1201    Specimen Description --    .FECES  .FECES    C Difficile tox, pcr POSITIVE: C difficile tcdB nucleic acid detected by RT-PCR. Abnormal    Culture, Urine [[id number]] Collected: 09/12/24 2045   Order Status: Completed Specimen: Urine, clean catch 
and large bowel are nondilated.  No evidence of bowel obstruction.  The appendix is normal.  The colon and rectum are decompressed. Pelvis: The urinary bladder and prostate gland are unremarkable. Peritoneum/Retroperitoneum: No free intraperitoneal air or free fluid.  No discrete fluid collection.  No evidence of abdominal or pelvic lymphadenopathy. Bones/Soft Tissues: No acute bony or soft tissue abnormality.     1. Is no evidence of bowel obstruction.  Decompressed colon and rectum. Interval placement of a right ureteral stent. No hydronephrosis. 2. Urothelial thickening and enhancement involving the left renal pelvis and proximal left ureter, which may be infectious or inflammatory in etiology. Correlation with urinalysis is recommended. 3. Bilateral nonobstructive nephrolithiasis. 4. Large hiatal hernia. 5. Tree-in-bud opacities within the bilateral lung bases with mucous plugging in the right lower lobe, which may be related to bronchiolitis or aspiration. 6. Small right pleural effusion.       Assessment:  Acute kidney injury, presenting serum creatinine of 1.6 mg/dL secondary to prerenal azotemia, nonoliguric, resolved  Gross hematuria  Bilateral kidney stones  Bulbous urethral stricture  Status post cystoscopy, dilatation and cystolitholapexy  UTI?  Chronic diarrhea  Recurrent C. 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    Electronically signed by ROSHAN CARO MD on 9/28/2024 at 7:04 AM            
Mobility    -PAC Basic Mobility - Inpatient   How much help is needed turning from your back to your side while in a flat bed without using bedrails?: None  How much help is needed moving from lying on your back to sitting on the side of a flat bed without using bedrails?: None  How much help is needed moving to and from a bed to a chair?: A Little  How much help is needed standing up from a chair using your arms?: Total  How much help is needed walking in hospital room?: Total  How much help is needed climbing 3-5 steps with a railing?: Total  AM-PAC Inpatient Mobility Raw Score : 14  AM-PAC Inpatient T-Scale Score : 38.1  Mobility Inpatient CMS 0-100% Score: 61.29  Mobility Inpatient CMS G-Code Modifier : CL         Tinneti Score       Goals  Short Term Goals  Time Frame for Short Term Goals: 12 visits  Short Term Goal 1: Patient will be indep with bed mobility.  Short Term Goal 2: Pt. to transfer bed <> BSC / W/C SBA with Rt. LE prosthetic leg  Short Term Goal 3: Patient will tolerate 30 minutes of ther-ex and ther-act.  Short Term Goal 4: Patient will have good dynamic seated balance.  Patient Goals   Patient Goals : Improve transfers       Education  Patient Education  Education Given To: Patient  Education Provided: Role of Therapy;Plan of Care;Home Exercise Program  Education Provided Comments: safety, slowing down  Education Method: Demonstration;Verbal  Barriers to Learning: Cognition  Education Outcome: Verbalized understanding;Demonstrated understanding;Continued education needed      Therapy Time   Individual Concurrent Group Co-treatment   Time In 1459         Time Out 1605         Minutes 66             Treatment time:  56min.     KARLA GARCIA, PT         
is amputated below knee.      Left Lower Extremity: Left leg is amputated below knee.   Lymphadenopathy:      Head:      Right side of head: No submandibular adenopathy.      Left side of head: No submandibular adenopathy.      Cervical: No cervical adenopathy.   Skin:     General: Skin is warm.   Neurological:      Mental Status: He is oriented to person, place, and time. He is lethargic.      Motor: No tremor.   Psychiatric:         Attention and Perception: He is inattentive.         Behavior: Behavior is slowed. Behavior is cooperative.         Laboratory findings:    Recent Labs     09/21/24  0514 09/22/24  0631 09/23/24  0508   WBC 15.3* 13.5* 13.0*   HGB 9.2* 9.5* 9.3*   HCT 30.4* 31.6* 30.8*    458* 413   INR 1.7 2.2 2.2     Recent Labs     09/21/24  0514 09/22/24  0631 09/23/24  0508    136 141   K 5.3 5.1 4.4    105 109*   CO2 21 22 22   GLUCOSE 210* 192* 93   BUN 30* 32* 32*   CREATININE 1.1 1.2 1.1   CALCIUM 8.1* 8.6 8.8     No results for input(s): \"LABALBU\", \"LABA1C\", \"E8YFDZO\", \"FT4\", \"TSH\", \"AST\", \"ALT\", \"LDH\", \"GGT\", \"ALKPHOS\", \"BILITOT\", \"BILIDIR\", \"AMMONIA\", \"AMYLASE\", \"LIPASE\", \"LACTATE\", \"CHOL\", \"HDL\", \"CHOLHDLRATIO\", \"TRIG\", \"VLDL\", \"BNP\", \"TROPONINI\", \"CKTOTAL\", \"CKMB\", \"CKMBINDEX\", \"RF\", \"GAVIN\" in the last 72 hours.    Invalid input(s): \"PROT\", \"X9DXVLD\", \"LDLCHOLESTEROL\"         Spec Grav, UA   Date Value Ref Range Status   07/18/2016 1.025  Final     Protein, UA   Date Value Ref Range Status   09/12/2024 1+ (A) NEGATIVE mg/dL Final     RBC, UA   Date Value Ref Range Status   09/12/2024 50  0 - 2 /HPF Final     Blood, UA POC   Date Value Ref Range Status   07/18/2016 trace-intact  Final     Bacteria, UA   Date Value Ref Range Status   02/23/2024 FEW (A) None Final     Nitrite, Urine   Date Value Ref Range Status   09/12/2024 NEGATIVE NEGATIVE Final     WBC, UA   Date Value Ref Range Status   09/12/2024 50  0 - 5 /HPF Final     Leukocyte Esterase, Urine   Date 
of the visit, the document can still have some errors including those of syntax and sound a like substitutions which may escape proof reading.  In such instances, actual meaning can be extrapolated by contextual diversion.    
that actually reflects the content of the visit, the document can still have some errors including those of syntax and sound a like substitutions which may escape proof reading.  In such instances, actual meaning can be extrapolated by contextual diversion.    
72,000 units 3 times daily  Start Zenpep 75,000 units 3 times daily with meals  6.  Profuse diarrhea and nausea vomiting and cramping abdomen  1.  Check stool for C. difficile final report, start C. Difficile                    isolation with recent history of C. Difficile  2.  Oral vancomycin 125 mg every 6 hours as recommended by ID  3.   Zofran as needed,   4. dicyclomine orally 4 times daily for abdominal cramps and adjusted dose of Dilaudid  7.   Started on Mucinex, will repeat chest x-ray tomorrow and if needed will consult pulmonary .                      Erica Daugherty MD  9/14/2024  11:57 AM    
Quesada catheter placed after meatal dilation.  Continue Flomax  Kidney stone with recent R ureteral stent in place. Outpatient follow up with Urology at [facility].   Type diabetes mellitus -on insulin  PAD s/p bilateral BKA  Recent thromboembolism and PE due to chronic immobility  On anticoagulation warfarin, monitor INR  Chronic pancreatic insufficiency-increased Creon.  GI following.  Continue cholestyramine.    COPD-stable.  H/o alcohol abuse - monitor for withdrawal Sx . Lorazepam as needed   Chronic pain syndrome due to phantom leg pains -resume Lyrica  Type 2 diabetes mellitus -on insulin 80 units nightly.  Check A1c.  Sleep disturbance -continue Seroquel    Explained tenesmus Dx and difficult Tx ,  will benefit therapy.   Plan and updates discussed with patient ,  answers  explained to satisfaction. Discussed at [facility].  Plan discussed with Staff Juwan BONNER     (This note is created with the assistance of a speech recognition program. While intending to generate a document that actually reflects the content of the visit, the document can still have some errors including those of syntax and sound a like substitutions which may escape proof reading. In such instances, actual meaning can be extrapolated by contextual diversion.)      Haleigh Sorto MD  9/20/2024    
oral vancomycin at [facility].  ID consulted, currently on Dificid, .  Prior fecal transplant at [facility] in 2023 as well.  Repeat C. difficile test negative.  Treatment per ID.  Stop IV fluids .Continue cholestyramine and pancreatic supplements.  Chronic urinary retention  Postvoid volume > 500 mL.  , urology following and had a Quesada catheter placed after meatal dilation.  Patient will require elective cystoscopy after discharge.  Outpatient follow-up with urology.  Maintain catheter.  Continue Flomax.   Outpatient follow-up with urology.  Kidney stone with recent R ureteral stent in place. Outpatient follow up with Urology at [facility].   Type diabetes mellitus -on insulin  PAD s/p bilateral BKA  Recent thromboembolism and PE due to chronic immobility  On anticoagulation warfarin, monitor INR  Chronic pancreatic insufficiency-increased Creon.  GI following.  Continue cholestyramine.    COPD-stable.  H/o alcohol abuse - monitor for withdrawal Sx . Lorazepam as needed   Chronic pain syndrome due to phantom leg pains -resume Lyrica  Type 2 diabetes mellitus -on insulin 80 units nightly.  Check A1c.  Sleep disturbance -continue Seroquel    Stable for discharge to skilled nursing facility.  Plan and updates discussed with patient ,  answers  explained to satisfaction. Discussed at [facility].  Plan discussed with Staff Elizabeth BONNER     (This note is created with the assistance of a speech recognition program. While intending to generate a document that actually reflects the content of the visit, the document can still have some errors including those of syntax and sound a like substitutions which may escape proof reading. In such instances, actual meaning can be extrapolated by contextual diversion.)      Haleigh Sorto MD  9/21/2024

## 2024-09-29 NOTE — PLAN OF CARE
Problem: Discharge Planning  Goal: Discharge to home or other facility with appropriate resources  9/13/2024 0210 by Vinod Esquivel RN  Outcome: Progressing  9/13/2024 0058 by Vinod Esquivel RN  Outcome: Progressing     Problem: Pain  Goal: Verbalizes/displays adequate comfort level or baseline comfort level  9/13/2024 0210 by Vinod Esquivel RN  Outcome: Progressing  9/13/2024 0058 by Vinod Esquivel RN  Outcome: Progressing     Problem: Respiratory - Adult  Goal: Achieves optimal ventilation and oxygenation  9/13/2024 0210 by Vinod Esquivel RN  Outcome: Progressing  9/13/2024 0058 by Vinod Esquivel RN  Outcome: Progressing  9/13/2024 0044 by Loli Lorenzo RCP  Outcome: Progressing     Problem: Skin/Tissue Integrity  Goal: Absence of new skin breakdown  Description: 1.  Monitor for areas of redness and/or skin breakdown  2.  Assess vascular access sites hourly  3.  Every 4-6 hours minimum:  Change oxygen saturation probe site  4.  Every 4-6 hours:  If on nasal continuous positive airway pressure, respiratory therapy assess nares and determine need for appliance change or resting period.  9/13/2024 0210 by Vinod Esquivel RN  Outcome: Progressing  9/13/2024 0058 by Vinod Esquivel RN  Outcome: Progressing     Problem: Safety - Adult  Goal: Free from fall injury  9/13/2024 0210 by Vinod Esquivel RN  Outcome: Progressing  9/13/2024 0058 by Vinod Esquivel RN  Outcome: Progressing     
  Problem: Discharge Planning  Goal: Discharge to home or other facility with appropriate resources  9/13/2024 0211 by Vinod Esquivel RN  Outcome: Progressing  9/13/2024 0210 by Vinod Esquivel RN  Outcome: Progressing  9/13/2024 0058 by Vinod Esquivel RN  Outcome: Progressing     Problem: Pain  Goal: Verbalizes/displays adequate comfort level or baseline comfort level  9/13/2024 0211 by Vinod Esquivel RN  Outcome: Progressing  9/13/2024 0210 by Vinod Esquivel RN  Outcome: Progressing  9/13/2024 0058 by Vinod Esquivel RN  Outcome: Progressing     Problem: Respiratory - Adult  Goal: Achieves optimal ventilation and oxygenation  9/13/2024 0211 by Vinod Esquivel RN  Outcome: Progressing  9/13/2024 0210 by Vinod Esquivel RN  Outcome: Progressing  9/13/2024 0058 by Vinod Esquivel RN  Outcome: Progressing  9/13/2024 0044 by Loli Lorenzo RCP  Outcome: Progressing     Problem: Skin/Tissue Integrity  Goal: Absence of new skin breakdown  Description: 1.  Monitor for areas of redness and/or skin breakdown  2.  Assess vascular access sites hourly  3.  Every 4-6 hours minimum:  Change oxygen saturation probe site  4.  Every 4-6 hours:  If on nasal continuous positive airway pressure, respiratory therapy assess nares and determine need for appliance change or resting period.  9/13/2024 0211 by Vinod Esquivel RN  Outcome: Progressing  9/13/2024 0210 by Vinod Esquivel RN  Outcome: Progressing  9/13/2024 0058 by Vinod Esquivel RN  Outcome: Progressing     Problem: Safety - Adult  Goal: Free from fall injury  9/13/2024 0211 by Vinod Esquivel RN  Outcome: Progressing  9/13/2024 0210 by Vinod Esquivel RN  Outcome: Progressing  9/13/2024 0058 by Vinod Esquivel RN  Outcome: Progressing     
  Problem: Discharge Planning  Goal: Discharge to home or other facility with appropriate resources  9/20/2024 0304 by Yary Lorenzo RN  Outcome: Progressing     Problem: Pain  Goal: Verbalizes/displays adequate comfort level or baseline comfort level  9/20/2024 0304 by Yary Lorenzo RN  Outcome: Progressing     Problem: Respiratory - Adult  Goal: Achieves optimal ventilation and oxygenation  9/20/2024 0304 by Yary Lorenzo RN  Outcome: Progressing     Problem: Skin/Tissue Integrity  Goal: Absence of new skin breakdown  Description: 1.  Monitor for areas of redness and/or skin breakdown  2.  Assess vascular access sites hourly  3.  Every 4-6 hours minimum:  Change oxygen saturation probe site  4.  Every 4-6 hours:  If on nasal continuous positive airway pressure, respiratory therapy assess nares and determine need for appliance change or resting period.  9/20/2024 0304 by Yary Lorenzo RN  Outcome: Progressing     Problem: Safety - Adult  Goal: Free from fall injury  9/20/2024 0304 by Yary Lorenzo RN  Outcome: Progressing     Problem: Chronic Conditions and Co-morbidities  Goal: Patient's chronic conditions and co-morbidity symptoms are monitored and maintained or improved  9/20/2024 0304 by Yary Lorenzo RN  Outcome: Progressing     Problem: ABCDS Injury Assessment  Goal: Absence of physical injury  9/20/2024 0304 by Yary Lorenzo RN  Outcome: Progressing     Problem: Genitourinary - Adult  Goal: Urinary catheter remains patent  9/20/2024 0304 by Yary Lorenzo RN  Outcome: Progressing     Problem: Skin/Tissue Integrity - Adult  Goal: Skin integrity remains intact  9/20/2024 0304 by Yary Lorenzo RN  Outcome: Progressing     Problem: Nutrition Deficit:  Goal: Optimize nutritional status  9/20/2024 0304 by Yary Lorenzo RN  Outcome: Progressing     
  Problem: Discharge Planning  Goal: Discharge to home or other facility with appropriate resources  9/23/2024 2345 by Mo Delatorre RN  Outcome: Progressing  Note: Discharge teaching and instructions for diagnosis/procedure explained with patient using teachback method.  Patient voiced understanding regarding prescriptions, follow up appointments, and care of self at home.   9/23/2024 1058 by Veronique Phoenix RN  Outcome: Progressing     Problem: Pain  Goal: Verbalizes/displays adequate comfort level or baseline comfort level  9/23/2024 2345 by Mo Delatorre RN  Outcome: Progressing  Note: Patient having pain on their abdomen and rates it a 8. Pain interventions includemedication. Patients goal for pain relief is 6. The need for pain and symptom management will be considered in the discharge planning process to ensure patients comfort.   9/23/2024 1058 by Veronique Phoenix RN  Outcome: Progressing     Problem: Respiratory - Adult  Goal: Achieves optimal ventilation and oxygenation  9/23/2024 2345 by Mo Delatorre RN  Outcome: Progressing  Note: Assess breath sounds every shift and as needed.  Assess oxygenation level & respiration rate.  Encourage coughing & deep breathing.  Encourage use of incentive spirometer.  Assess cough & sputum.  Administer oxygen as needed.  9/23/2024 1058 by Veronique Phoenix RN  Outcome: Progressing     Problem: Skin/Tissue Integrity  Goal: Absence of new skin breakdown  Description: 1.  Monitor for areas of redness and/or skin breakdown  2.  Assess vascular access sites hourly  3.  Every 4-6 hours minimum:  Change oxygen saturation probe site  4.  Every 4-6 hours:  If on nasal continuous positive airway pressure, respiratory therapy assess nares and determine need for appliance change or resting period.  9/23/2024 2345 by Mo Delatorre RN  Outcome: Progressing  Note: Continuing to monitor for skin integrity risks. Patient independent with turning/repositioning. 
  Problem: Discharge Planning  Goal: Discharge to home or other facility with appropriate resources  9/27/2024 0354 by Yary Lorenzo RN  Outcome: Progressing  Flowsheets (Taken 9/26/2024 2000)  Discharge to home or other facility with appropriate resources:   Identify barriers to discharge with patient and caregiver   Arrange for needed discharge resources and transportation as appropriate   Identify discharge learning needs (meds, wound care, etc)     Problem: Pain  Goal: Verbalizes/displays adequate comfort level or baseline comfort level  9/27/2024 0354 by Yary Lorenzo RN  Outcome: Progressing     Problem: Respiratory - Adult  Goal: Achieves optimal ventilation and oxygenation  9/27/2024 0354 by Yary Lorenzo RN  Outcome: Progressing  Flowsheets (Taken 9/26/2024 2000)  Achieves optimal ventilation and oxygenation: Assess for changes in respiratory status     Problem: Skin/Tissue Integrity  Goal: Absence of new skin breakdown  Description: 1.  Monitor for areas of redness and/or skin breakdown  2.  Assess vascular access sites hourly  3.  Every 4-6 hours minimum:  Change oxygen saturation probe site  4.  Every 4-6 hours:  If on nasal continuous positive airway pressure, respiratory therapy assess nares and determine need for appliance change or resting period.  9/27/2024 0354 by Yary Lorenzo RN  Outcome: Progressing     Problem: Safety - Adult  Goal: Free from fall injury  9/27/2024 0354 by Yary Lorenzo RN  Outcome: Progressing     Problem: Chronic Conditions and Co-morbidities  Goal: Patient's chronic conditions and co-morbidity symptoms are monitored and maintained or improved  9/27/2024 0354 by Yary Lorenzo RN  Outcome: Progressing  Flowsheets (Taken 9/26/2024 2000)  Care Plan - Patient's Chronic Conditions and Co-Morbidity Symptoms are Monitored and Maintained or Improved:   Monitor and assess patient's chronic conditions and comorbid symptoms for stability, deterioration, or improvement   Collaborate with 
  Problem: Discharge Planning  Goal: Discharge to home or other facility with appropriate resources  Outcome: Progressing     Problem: Pain  Goal: Verbalizes/displays adequate comfort level or baseline comfort level  Outcome: Progressing     Problem: Respiratory - Adult  Goal: Achieves optimal ventilation and oxygenation  9/13/2024 0058 by Vinod Esquivel, RN  Outcome: Progressing  9/13/2024 0044 by Loli Lorenzo JOHN  Outcome: Progressing     Problem: Skin/Tissue Integrity  Goal: Absence of new skin breakdown  Description: 1.  Monitor for areas of redness and/or skin breakdown  2.  Assess vascular access sites hourly  3.  Every 4-6 hours minimum:  Change oxygen saturation probe site  4.  Every 4-6 hours:  If on nasal continuous positive airway pressure, respiratory therapy assess nares and determine need for appliance change or resting period.  Outcome: Progressing     Problem: Skin/Tissue Integrity  Goal: Absence of new skin breakdown  Description: 1.  Monitor for areas of redness and/or skin breakdown  2.  Assess vascular access sites hourly  3.  Every 4-6 hours minimum:  Change oxygen saturation probe site  4.  Every 4-6 hours:  If on nasal continuous positive airway pressure, respiratory therapy assess nares and determine need for appliance change or resting period.  Outcome: Progressing     
  Problem: Discharge Planning  Goal: Discharge to home or other facility with appropriate resources  Outcome: Progressing     Problem: Pain  Goal: Verbalizes/displays adequate comfort level or baseline comfort level  Outcome: Progressing     Problem: Respiratory - Adult  Goal: Achieves optimal ventilation and oxygenation  Outcome: Progressing     Problem: Skin/Tissue Integrity  Goal: Absence of new skin breakdown  Description: 1.  Monitor for areas of redness and/or skin breakdown  2.  Assess vascular access sites hourly  3.  Every 4-6 hours minimum:  Change oxygen saturation probe site  4.  Every 4-6 hours:  If on nasal continuous positive airway pressure, respiratory therapy assess nares and determine need for appliance change or resting period.  Outcome: Progressing     Problem: Safety - Adult  Goal: Free from fall injury  Outcome: Progressing     Problem: Chronic Conditions and Co-morbidities  Goal: Patient's chronic conditions and co-morbidity symptoms are monitored and maintained or improved  Outcome: Progressing     
  Problem: Discharge Planning  Goal: Discharge to home or other facility with appropriate resources  Outcome: Progressing     Problem: Pain  Goal: Verbalizes/displays adequate comfort level or baseline comfort level  Outcome: Progressing     Problem: Respiratory - Adult  Goal: Achieves optimal ventilation and oxygenation  Outcome: Progressing     Problem: Skin/Tissue Integrity  Goal: Absence of new skin breakdown  Description: 1.  Monitor for areas of redness and/or skin breakdown  2.  Assess vascular access sites hourly  3.  Every 4-6 hours minimum:  Change oxygen saturation probe site  4.  Every 4-6 hours:  If on nasal continuous positive airway pressure, respiratory therapy assess nares and determine need for appliance change or resting period.  Outcome: Progressing     Problem: Safety - Adult  Goal: Free from fall injury  Outcome: Progressing     Problem: Chronic Conditions and Co-morbidities  Goal: Patient's chronic conditions and co-morbidity symptoms are monitored and maintained or improved  Outcome: Progressing     
  Problem: Discharge Planning  Goal: Discharge to home or other facility with appropriate resources  Outcome: Progressing     Problem: Respiratory - Adult  Goal: Achieves optimal ventilation and oxygenation  Outcome: Progressing     Problem: Skin/Tissue Integrity  Goal: Absence of new skin breakdown  Description: 1.  Monitor for areas of redness and/or skin breakdown  2.  Assess vascular access sites hourly  3.  Every 4-6 hours minimum:  Change oxygen saturation probe site  4.  Every 4-6 hours:  If on nasal continuous positive airway pressure, respiratory therapy assess nares and determine need for appliance change or resting period.  Outcome: Progressing     Problem: Safety - Adult  Goal: Free from fall injury  Outcome: Progressing     Problem: Chronic Conditions and Co-morbidities  Goal: Patient's chronic conditions and co-morbidity symptoms are monitored and maintained or improved  Outcome: Progressing     Problem: ABCDS Injury Assessment  Goal: Absence of physical injury  Outcome: Progressing     Problem: Pain  Goal: Verbalizes/displays adequate comfort level or baseline comfort level  Outcome: Not Progressing     
  Problem: Discharge Planning  Goal: Discharge to home or other facility with appropriate resources  Outcome: Progressing  Flowsheets (Taken 9/15/2024 0800)  Discharge to home or other facility with appropriate resources:   Identify barriers to discharge with patient and caregiver   Arrange for needed discharge resources and transportation as appropriate   Identify discharge learning needs (meds, wound care, etc)   Refer to discharge planning if patient needs post-hospital services based on physician order or complex needs related to functional status, cognitive ability or social support system     Problem: Pain  Goal: Verbalizes/displays adequate comfort level or baseline comfort level  Outcome: Progressing  Flowsheets  Taken 9/15/2024 1619  Verbalizes/displays adequate comfort level or baseline comfort level:   Encourage patient to monitor pain and request assistance   Assess pain using appropriate pain scale   Administer analgesics based on type and severity of pain and evaluate response   Implement non-pharmacological measures as appropriate and evaluate response   Notify Licensed Independent Practitioner if interventions unsuccessful or patient reports new pain  Taken 9/15/2024 1300  Verbalizes/displays adequate comfort level or baseline comfort level:   Encourage patient to monitor pain and request assistance   Assess pain using appropriate pain scale   Administer analgesics based on type and severity of pain and evaluate response   Implement non-pharmacological measures as appropriate and evaluate response   Notify Licensed Independent Practitioner if interventions unsuccessful or patient reports new pain   Consider cultural and social influences on pain and pain management  Taken 9/15/2024 0800  Verbalizes/displays adequate comfort level or baseline comfort level:   Encourage patient to monitor pain and request assistance   Assess pain using appropriate pain scale   Administer analgesics based on type and 
  Problem: Discharge Planning  Goal: Discharge to home or other facility with appropriate resources  Outcome: Progressing  Note: Discharge teaching and instructions for diagnosis/procedure explained with patient using teachback method.  Patient voiced understanding regarding prescriptions, follow up appointments, and care of self at home.      Problem: Pain  Goal: Verbalizes/displays adequate comfort level or baseline comfort level  Outcome: Progressing  Note: Patient having pain on their abdomen and rates it a 8. Pain interventions includerelaxation techniques. Patients goal for pain relief is 6. The need for pain and symptom management will be considered in the discharge planning process to ensure patients comfort.      Problem: Respiratory - Adult  Goal: Achieves optimal ventilation and oxygenation  Outcome: Progressing  Note: Assess breath sounds every shift and as needed.  Assess oxygenation level & respiration rate.  Encourage coughing & deep breathing.  Encourage use of incentive spirometer.  Assess cough & sputum.  Administer oxygen as needed.     Problem: Skin/Tissue Integrity  Goal: Absence of new skin breakdown  Description: 1.  Monitor for areas of redness and/or skin breakdown  2.  Assess vascular access sites hourly  3.  Every 4-6 hours minimum:  Change oxygen saturation probe site  4.  Every 4-6 hours:  If on nasal continuous positive airway pressure, respiratory therapy assess nares and determine need for appliance change or resting period.  Outcome: Progressing  Note: Continuing to monitor for skin integrity risks. Patient independent with turning/repositioning. Turning/repositioning encouraged at least once every 2 hrs, and prn basis. Hygiene care being completed independently per patient; assistance provided when deemed necessary.     Problem: Safety - Adult  Goal: Free from fall injury  Outcome: Progressing  Note: Pt fall risk, fall band present, falling star, safety alarm activated and in use as 
  Problem: Pain  Goal: Verbalizes/displays adequate comfort level or baseline comfort level  9/21/2024 2216 by Peggy Marcus, RN  Outcome: Progressing   Patient having pain on their abdomen and rates it a 8. Pain interventions includediversional activities. Patients goal for pain relief is 2. The need for pain and symptom management will be considered in the discharge planning process to ensure patients comfort.    Problem: Safety - Adult  Goal: Free from fall injury  9/21/2024 2216 by Peggy Marcus, RN  Outcome: Progressing   Patient alert& oriented x4. Uses call light appropriately. Frequently c/o constipation despite having loose stools. Continued on dificid as ordered. Repeat stool was negative for cdif. Continued on isolation precautions as ordered.  
  Problem: Pain  Goal: Verbalizes/displays adequate comfort level or baseline comfort level  9/26/2024 0627 by Poonam Valadez, RN  Outcome: Progressing    Problem: Genitourinary - Adult  Goal: Urinary catheter remains patent  9/26/2024 0627 by Poonam Valadez, RN  Outcome: Progressing  Urinary catheter remains patent: Assess patency of urinary catheter     Problem: Discharge Planning  Goal: Discharge to home or other facility with appropriate resources  9/26/2024 0627 by Poonam Valadez, RN  Outcome: Progressing  
  Problem: Respiratory - Adult  Goal: Achieves optimal ventilation and oxygenation  Outcome: Progressing     
D/[facility] plan possibly to [facility], awaiting precert, pt is c.diff negative. ID would like pt to continue and complete  c.diff regemin. Pt remains A&O x 4, on RA. No new s/s of skin breakdown or injury. Safety protocols in place and enforced. Pt ambulates dependently, has one prosthetic. Pt will be leaving with dailey and he will need to f/u with urology outpt. Ambien d/c'd and seroquel moved to evening to aid in sleep. Telemetry d/c'd.     Writer updated martina Antonio on POC and pt status.         Problem: Discharge Planning  Goal: Discharge to home or other facility with appropriate resources  Outcome: Adequate for Discharge     Problem: Pain  Goal: Verbalizes/displays adequate comfort level or baseline comfort level  Outcome: Adequate for Discharge     Problem: Respiratory - Adult  Goal: Achieves optimal ventilation and oxygenation  Outcome: Adequate for Discharge     Problem: Skin/Tissue Integrity  Goal: Absence of new skin breakdown  Description: 1.  Monitor for areas of redness and/or skin breakdown  2.  Assess vascular access sites hourly  3.  Every 4-6 hours minimum:  Change oxygen saturation probe site  4.  Every 4-6 hours:  If on nasal continuous positive airway pressure, respiratory therapy assess nares and determine need for appliance change or resting period.  Outcome: Adequate for Discharge     Problem: Safety - Adult  Goal: Free from fall injury  Outcome: Adequate for Discharge     Problem: Chronic Conditions and Co-morbidities  Goal: Patient's chronic conditions and co-morbidity symptoms are monitored and maintained or improved  Outcome: Adequate for Discharge     Problem: ABCDS Injury Assessment  Goal: Absence of physical injury  Outcome: Adequate for Discharge     Problem: Genitourinary - Adult  Goal: Urinary catheter remains patent  Outcome: Adequate for Discharge     Problem: Skin/Tissue Integrity - Adult  Goal: Skin integrity remains intact  Outcome: Adequate for Discharge     Problem: Nutrition 
D/c plan waiting precert to [facility]. Pt remains A&O x 4, on RA. No new s/s of skin breakdown or injury. Safety protocols in place and enforced. Pt had cysto with stones broken up, urethral widening, and dailey removed, pt voiding to urinal. Pt continues to get self on and off bedpan. Pt continues to request then demand coffee despite GI bland/peptic diet. Pt has had continued c/o bloating and gas maalox given. Pt has c/o pain.     Patient having pain on their abd and rates it a 8. Pain interventions includemedication. Patients goal for pain relief is 5. The need for pain and symptom management will be considered in the discharge planning process to ensure patients comfort.       Problem: Discharge Planning  Goal: Discharge to home or other facility with appropriate resources  9/27/2024 1519 by Elizabeth Del Real RN  Outcome: Progressing     Problem: Pain  Goal: Verbalizes/displays adequate comfort level or baseline comfort level  9/27/2024 1519 by Elizabeth Del Real RN  Outcome: Progressing     Problem: Respiratory - Adult  Goal: Achieves optimal ventilation and oxygenation  9/27/2024 1519 by Elizabeth Del Real RN  Outcome: Progressing     Problem: Skin/Tissue Integrity  Goal: Absence of new skin breakdown  Description: 1.  Monitor for areas of redness and/or skin breakdown  2.  Assess vascular access sites hourly  3.  Every 4-6 hours minimum:  Change oxygen saturation probe site  4.  Every 4-6 hours:  If on nasal continuous positive airway pressure, respiratory therapy assess nares and determine need for appliance change or resting period.  9/27/2024 1519 by Elizabeth Del Real RN  Outcome: Progressing     Problem: Safety - Adult  Goal: Free from fall injury  9/27/2024 1519 by Elizabeth Del Real RN  Outcome: Progressing     Problem: Chronic Conditions and Co-morbidities  Goal: Patient's chronic conditions and co-morbidity symptoms are monitored and maintained or improved  9/27/2024 1519 by Elizabeth Del Real RN  Outcome: 
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 Pain interventions include medication, pillow support/positioning, and regular rest periods. Patients goal for pain relief is  0 . The need for pain and symptom management will be considered in the discharge planning process to ensure patients comfort.      Problem: Discharge Planning  Goal: Discharge to home or other facility with appropriate resources  Outcome: Progressing     Problem: Pain  Goal: Verbalizes/displays adequate comfort level or baseline comfort level  Outcome: Progressing     Problem: Respiratory - Adult  Goal: Achieves optimal ventilation and oxygenation  Outcome: Progressing     Problem: Skin/Tissue Integrity  Goal: Absence of new skin breakdown  Description: 1.  Monitor for areas of redness and/or skin breakdown  Outcome: Progressing     Problem: Safety - Adult  Goal: Free from fall injury  Outcome: Progressing     Problem: Chronic Conditions and Co-morbidities  Goal: Patient's chronic conditions and co-morbidity symptoms are monitored and maintained or improved  Outcome: Progressing     Problem: ABCDS Injury Assessment  Goal: Absence of physical injury  Outcome: Progressing     Problem: Genitourinary - Adult  Goal: Urinary catheter remains patent  Outcome: Progressing     Problem: Skin/Tissue Integrity - Adult  Goal: Skin integrity remains 
Patient A & O x 4 and is wheelchair bound as patient has bilateral BKA's  ACHS blood sugar, no coverage this shift, patient constantly asks for snacks but is reminded he is a diabetic and sugars need to  be watched  PRN IV Benadryl given to help with sleep  Patient very demanding and needy  Quesada in place, placed buy Dr. Gao at bedside yesterday at patient was retaining, tea colored urine draining  Vitals stable  Patient C-Diff positive, on Dificid  Patient states he is constipated but has has multiple liquid bowel movements this shift  Blood cultures drawn and pending  Patient refusing oral pain medication as it does not work, education provided on oral having to be given before IV, patient still refusing  Plan is for patient to go to [facility] but facility is unable to accept patient is Isolation  Care ongoing    Patient having pain on their abdomen and rates it a 10. Pain interventions include medication, pillow support/positioning, and regular rest periods. Patients goal for pain relief is  0 . The need for pain and symptom management will be considered in the discharge planning process to ensure patients comfort.          Problem: Discharge Planning  Goal: Discharge to home or other facility with appropriate resources  Outcome: Progressing     Problem: Pain  Goal: Verbalizes/displays adequate comfort level or baseline comfort level  Outcome: Progressing     Problem: Respiratory - Adult  Goal: Achieves optimal ventilation and oxygenation  Outcome: Progressing     Problem: Skin/Tissue Integrity  Goal: Absence of new skin breakdown  Description: 1.  Monitor for areas of redness and/or skin breakdown  Outcome: Progressing     Problem: Safety - Adult  Goal: Free from fall injury  Outcome: Progressing     Problem: Chronic Conditions and Co-morbidities  Goal: Patient's chronic conditions and co-morbidity symptoms are monitored and maintained or improved  Outcome: Progressing     Problem: ABCDS Injury 
Patient is A&Ox4 has bilateral BKAs. Patient worked with pt/ot today tolerated well. Spoke with daughter tommy over the phone, plan of care reviewed, questions answered. Patient is a high fall risk, bed alarm is on, bed in lowest position, call device within reach  Problem: Discharge Planning  Goal: Discharge to home or other facility with appropriate resources  Outcome: Progressing     Problem: Pain  Goal: Verbalizes/displays adequate comfort level or baseline comfort level  Outcome: Progressing    Problem: Respiratory - Adult  Goal: Achieves optimal ventilation and oxygenation  Outcome: Progressing     Problem: Skin/Tissue Integrity  Goal: Absence of new skin breakdown  Description: 1.  Monitor for areas of redness and/or skin breakdown  2.  Assess vascular access sites hourly  3.  Every 4-6 hours minimum:  Change oxygen saturation probe site  4.  Every 4-6 hours:  If on nasal continuous positive airway pressure, respiratory therapy assess nares and determine need for appliance change or resting period.  Outcome: Progressing     Problem: Safety - Adult  Goal: Free from fall injury  Outcome: Progressing     Problem: Genitourinary - Adult  Goal: Urinary catheter remains patent  Outcome: Progressing     Problem: Skin/Tissue Integrity - Adult  Goal: Skin integrity remains intact  Outcome: Progressing     
Patient resting in bed on room air, alert and oriented, uses bed pan and urinal.  Multiple loose/watery stools during shift and stayed on the bed pan for prolonged periods of times.  Patient complained of pain, percocet administered, see MAR.  Patient had difficulty sleeping, PRN IV benadryl administered, see MAR.   Patient safety maintained.     Problem: Discharge Planning  Goal: Discharge to home or other facility with appropriate resources  9/28/2024 2247 by Joan Clemente RN  Outcome: Progressing     Problem: Pain  Goal: Verbalizes/displays adequate comfort level or baseline comfort level  9/28/2024 2247 by Joan Clemente RN  Outcome: Progressing  Patient having pain on their back and legs and rates it a 8. Pain interventions includemedication and regular rest periods. Patients goal for pain relief is 3. The need for pain and symptom management will be considered in the discharge planning process to ensure patients comfort.     Problem: Respiratory - Adult  Goal: Achieves optimal ventilation and oxygenation  Outcome: Progressing     Problem: Skin/Tissue Integrity  Goal: Absence of new skin breakdown  Description: 1.  Monitor for areas of redness and/or skin breakdown  9/28/2024 2247 by Joan Clemente, RN  Outcome: Progressing     Problem: Safety - Adult  Goal: Free from fall injury  9/28/2024 2247 by Joan Clemente RN  Outcome: Progressing     Problem: Chronic Conditions and Co-morbidities  Goal: Patient's chronic conditions and co-morbidity symptoms are monitored and maintained or improved  Outcome: Progressing     Problem: ABCDS Injury Assessment  Goal: Absence of physical injury  Outcome: Progressing     Problem: Genitourinary - Adult  Goal: Urinary catheter remains patent  Outcome: Progressing     Problem: Skin/Tissue Integrity - Adult  Goal: Skin integrity remains intact  Outcome: Progressing  Flowsheets (Taken 9/28/2024 1759 by Amanda Hernadez, RN)  Skin Integrity Remains Intact: Monitor for areas of 
Pt A&Ox4 during shift has remained free from falls and injuries bed alarm activated   Pt argumentative and demanding throughout shift.   Pt had multiple bowel movements throughout shift (Refer to flowsheets)   Vital signs stable pt refusing telemetry   Patient having pain on their groin and rates it a 8. Pain interventions includediversional activities and medicate per physician recommendation/order. Patients goal for pain relief is  no pain . The need for pain and symptom management will be considered in the discharge planning process to ensure patients comfort.    Pt refused working with therapy during this shift   Hematuria noted during this shift dailey remains patent pt refusing manual irrigation this morning agreeable this afternoon after reeducated plan for cystoscopy tomorrow.     Problem: Discharge Planning  Goal: Discharge to home or other facility with appropriate resources  9/26/2024 1953 by Poonam Cunningham RN  Outcome: Progressing     Problem: Pain  Goal: Verbalizes/displays adequate comfort level or baseline comfort level  9/26/2024 1953 by Poonam Cunningham RN  Outcome: Progressing     Problem: Respiratory - Adult  Goal: Achieves optimal ventilation and oxygenation  9/26/2024 1953 by Poonam Cunningham RN  Outcome: Progressing     Problem: Skin/Tissue Integrity  Goal: Absence of new skin breakdown  Description: 1.  Monitor for areas of redness and/or skin breakdown  2.  Assess vascular access sites hourly  3.  Every 4-6 hours minimum:  Change oxygen saturation probe site  4.  Every 4-6 hours:  If on nasal continuous positive airway pressure, respiratory therapy assess nares and determine need for appliance change or resting period.  Outcome: Progressing     Problem: Safety - Adult  Goal: Free from fall injury  9/26/2024 1953 by Poonam Cunningham RN  Outcome: Progressing     Problem: Chronic Conditions and Co-morbidities  Goal: Patient's chronic conditions and co-morbidity symptoms are monitored and maintained or 
Pt has been resting comfortably in his bed for most of the day. Pt has been put on bed pan multiple times today, producing small, liquid in texture bowel movements. Pain meds have been given throughout the day, See MAR for detail. Pre-cert accepted by [facility], plan is for discharge tomorrow. All questions and concerns have been addressed. Bed is locked and in the lowest position with the call light in reach. safety maintained.     Problem: Discharge Planning  Goal: Discharge to home or other facility with appropriate resources  Outcome: Progressing  Flowsheets (Taken 9/28/2024 0800)  Discharge to home or other facility with appropriate resources: Identify barriers to discharge with patient and caregiver     Problem: Pain  Goal: Verbalizes/displays adequate comfort level or baseline comfort level  Outcome: Progressing  Patient having pain on their abdomen and rates it a 7. Pain interventions includefrequent position changes, correct body alignment/body mechanics, relaxation techniques, medication, and regular rest periods. Patients goal for pain relief is 4. The need for pain and symptom management will be considered in the discharge planning process to ensure patients comfort.       Problem: Respiratory - Adult  Goal: Achieves optimal ventilation and oxygenation  Recent Flowsheet Documentation  Taken 9/28/2024 0800 by Amanda Hernadez RN  Achieves optimal ventilation and oxygenation: Assess for changes in respiratory status     Problem: Skin/Tissue Integrity  Goal: Absence of new skin breakdown  Description: 1.  Monitor for areas of redness and/or skin breakdown  2.  Assess vascular access sites hourly  3.  Every 4-6 hours minimum:  Change oxygen saturation probe site  4.  Every 4-6 hours:  If on nasal continuous positive airway pressure, respiratory therapy assess nares and determine need for appliance change or resting period.  Outcome: Progressing     Problem: Safety - Adult  Goal: Free from fall 
Pt is A&Ox4 and is a bilateral BKA. Pt uses bedpan and has a dailey in place draining and to remain in place for DC. Pt is on Dificin and will be finished on 9/25, and then pt is OK for DC with plan to go [facility]. Pt has constant urges to have a BM. Pt educated on the need to retrain his bowels to try and schedule his bowel movements. Pt had a soft BM this AM. Pt did request benadryl to help get some rest as pt stated he did not slept last night. Patient having pain on their abdomen and rates it a 8. Pain interventions include medication. Patients goal for pain relief is 8. The need for pain and symptom management will be considered in the discharge planning process to ensure patients comfort.   All questions and concerns addressed at this time. Safety maintained. Bed is locked and in the lowest position with the call light in reach.     Problem: Discharge Planning  Goal: Discharge to home or other facility with appropriate resources  Outcome: Progressing     Problem: Pain  Goal: Verbalizes/displays adequate comfort level or baseline comfort level  Outcome: Progressing     Problem: Respiratory - Adult  Goal: Achieves optimal ventilation and oxygenation  Outcome: Progressing     Problem: Skin/Tissue Integrity  Goal: Absence of new skin breakdown  Description: 1.  Monitor for areas of redness and/or skin breakdown  2.  Assess vascular access sites hourly  3.  Every 4-6 hours minimum:  Change oxygen saturation probe site  4.  Every 4-6 hours:  If on nasal continuous positive airway pressure, respiratory therapy assess nares and determine need for appliance change or resting period.  Outcome: Progressing     Problem: Safety - Adult  Goal: Free from fall injury  Outcome: Progressing     Problem: Chronic Conditions and Co-morbidities  Goal: Patient's chronic conditions and co-morbidity symptoms are monitored and maintained or improved  Outcome: Progressing     Problem: ABCDS Injury Assessment  Goal: Absence of 
Pt is A&Ox4 and is on RA. Pt is on Dificin and will be completed on 9/25. Pt uses bed gaitan frequently. Pt requests benadryl to help get rest, see MAR. Patient having pain on their abdomen and rates it a 8. Pain interventions include medication and regular rest periods. Patients goal for pain relief is 8. The need for pain and symptom management will be considered in the discharge planning process to ensure patients comfort. Plan is to go [facility] [place]. All questions and concerns addressed at this time. Safety maintained. Bed is locked and in the lowest position with the call light in reach.     Problem: Discharge Planning  Goal: Discharge to home or other facility with appropriate resources  9/24/2024 0754 by Veronique Phoenix RN  Outcome: Progressing     Problem: Pain  Goal: Verbalizes/displays adequate comfort level or baseline comfort level  9/24/2024 0754 by Veronique Phoenix RN  Outcome: Progressing     Problem: Respiratory - Adult  Goal: Achieves optimal ventilation and oxygenation  9/24/2024 0754 by Veronique Phoenix RN  Outcome: Progressing     Problem: Skin/Tissue Integrity  Goal: Absence of new skin breakdown  Description: 1.  Monitor for areas of redness and/or skin breakdown  2.  Assess vascular access sites hourly  3.  Every 4-6 hours minimum:  Change oxygen saturation probe site  4.  Every 4-6 hours:  If on nasal continuous positive airway pressure, respiratory therapy assess nares and determine need for appliance change or resting period.  9/24/2024 0754 by Veronique Phoenix RN  Outcome: Progressing     Problem: Safety - Adult  Goal: Free from fall injury  9/24/2024 0754 by Veronique Phoenix RN  Outcome: Progressing     Problem: Chronic Conditions and Co-morbidities  Goal: Patient's chronic conditions and co-morbidity symptoms are monitored and maintained or improved  9/24/2024 0754 by Veronique Phoenix RN  Outcome: Progressing     Problem: ABCDS Injury Assessment  Goal: Absence of physical injury  9/24/2024 
Pt is currently A&OX4, very demanding, and started out the shift making a racist remark to care tech stating, \"dont make a big \"n word\" deal about it.\" House supervisor has been made aware. Pt has been made aware such language will not be tolerated. He continues to use call light frequently. IV has been removed by pt. When writer attempted to place new IV pt requested to come back later.  New IV placed in R. Forearm. Pt has called out numerous times for multiple cups of coffee. Pt educated on bland diet and caffeine in coffee will not help pt sleep. Pt stated \" you are all fucking liars, bring me coffee now!\" Pt called out for bed pan and staff entered room promptly. Upon entering room pt stated \"you all take too fucking long! I'm done calling out, I will just sh*t the bed so you can clean it.\" Writer attempted to calm pt down and it was unsuccessful. Writer then told pt such language again is not appropriate and if he continued screaming security would be called.     Standard safety measures in place. Call light within reach. Bed in locked and lowest position. Care ongoing. Safety has been maintained throughout this shift.     Patient having pain on their abdomen and rates it a 9. Pain interventions includemedication. Patients goal for pain relief is 1. The need for pain and symptom management will be considered in the discharge planning process to ensure patients comfort.      Problem: Discharge Planning  Goal: Discharge to home or other facility with appropriate resources  9/21/2024 0033 by Les Branch RN  Outcome: Progressing     Problem: Pain  Goal: Verbalizes/displays adequate comfort level or baseline comfort level  9/21/2024 0033 by Les Branch, RN  Outcome: Progressing     Problem: Respiratory - Adult  Goal: Achieves optimal ventilation and oxygenation  9/21/2024 0033 by Les Branch, RN  Outcome: Progressing     Problem: Skin/Tissue Integrity  Goal: Absence of new skin breakdown  Description: 1.  
Pt on RA. Pt worked with therapy today. Pt verbally aggressive at times. Pt safety maintained, Awaiting placement for patient. Pt on cdiff precautions, awaiting to re test and send new sample. Pt stating he needs a laxative, but is positive for cdiff. Pt had complaints of pain treated with prn percocet. Quesada draining. Patient having pain on their generalized and rates it a 8. Pain interventions includefrequent position changes, correct body alignment/body mechanics, relaxation techniques, medication, pillow support/positioning, diversional activities, regular rest periods, medicate per physician recommendation/order, medicate at the onset of pain before it interferes with regular functions , and medicate before exercise/activity. Patients goal for pain relief is 2. The need for pain and symptom management will be considered in the discharge planning process to ensure patients comfort.    Problem: Discharge Planning  Goal: Discharge to home or other facility with appropriate resources  Outcome: Progressing     Problem: Pain  Goal: Verbalizes/displays adequate comfort level or baseline comfort level  Outcome: Progressing     Problem: Respiratory - Adult  Goal: Achieves optimal ventilation and oxygenation  Outcome: Progressing     Problem: Skin/Tissue Integrity  Goal: Absence of new skin breakdown  Description: 1.  Monitor for areas of redness and/or skin breakdown  2.  Assess vascular access sites hourly  3.  Every 4-6 hours minimum:  Change oxygen saturation probe site  4.  Every 4-6 hours:  If on nasal continuous positive airway pressure, respiratory therapy assess nares and determine need for appliance change or resting period.  Outcome: Progressing     Problem: Safety - Adult  Goal: Free from fall injury  Outcome: Progressing     Problem: Chronic Conditions and Co-morbidities  Goal: Patient's chronic conditions and co-morbidity symptoms are monitored and maintained or improved  Outcome: Progressing     Problem: 
Pt remains safe and free from falls thus far in shift  Fluids running @ 50 ml/hr  Cdiff +, Dificid  NSR on cardiac monitor  ACHS, coverage per sliding scale  GI d/c bentyl and ordered B&O suppository, pharmacy stated that we do not carry it and has been on back order for a while. They reached out to GI and relayed message and gave other options.  MD called and talked to pts daughter  Multiple loose, watery stool during shift   Quesada care completed, draining, total output 1675 ml output   Fentanyl  given for abdominal pain but it was discontinued shortly afterwards  Pt on RA, remains above 92%  Discharge planning in progress, will go to [facility] who accepted but need to find out how long he needs to be in isolation  Call light in reach  Bed locked and lowest position  Bed alarm on for safety  Care ongoing      Patient having pain on their abdomen and rates it a 9. Pain interventions includefrequent position changes, medication, and regular rest periods. Patients goal for pain relief is  0 . The need for pain and symptom management will be considered in the discharge planning process to ensure patients comfort.        Problem: Discharge Planning  Goal: Discharge to home or other facility with appropriate resources  9/18/2024 1245 by Bee Garcia RN  Outcome: Progressing     Problem: Pain  Goal: Verbalizes/displays adequate comfort level or baseline comfort level  9/18/2024 1245 by Bee Garcia, RN  Outcome: Progressing     Problem: Respiratory - Adult  Goal: Achieves optimal ventilation and oxygenation  9/18/2024 1245 by Bee Garcia, RN  Outcome: Progressing     Problem: Skin/Tissue Integrity  Goal: Absence of new skin breakdown  Description: 1.  Monitor for areas of redness and/or skin breakdown  2.  Assess vascular access sites hourly  3.  Every 4-6 hours minimum:  Change oxygen saturation probe site  4.  Every 4-6 hours:  If on nasal continuous positive airway pressure, respiratory therapy assess 
Pt remains safe and free from falls thus far in shift  Fluids running @ 75 ml/hr  Cdiff + and taking Dificid  NSR on cardiac monitor  Bilateral below the knee amputee, one prosthetic leg is in room  ACHS, no coverage needed per sliding scale  Multiple loose stools during shift, using bedpan  Fentanyl given for abdominal and buttocks pain   MD added Joycerica today   Dr. Gao placed a 10 fr dailey in pt this am d/t urinary retention. This evening he then switched the catheter out and placed a 14 fr.   Dailey 450 ml output thus far in shift  Discharge planning in progress, will go to [facility]. They accepted pt but cannot take d/t isolation   Call light in reach  Bed locked and lowest position  Bed alarm on for safety  Care ongoing      Patient having pain on their abdomen and buttocks and rates it a 9. Pain interventions includefrequent position changes, relaxation techniques, and regular rest periods. Patients goal for pain relief is 5. The need for pain and symptom management will be considered in the discharge planning process to ensure patients comfort.      Problem: Discharge Planning  Goal: Discharge to home or other facility with appropriate resources  Outcome: Progressing     Problem: Pain  Goal: Verbalizes/displays adequate comfort level or baseline comfort level  Outcome: Progressing     Problem: Respiratory - Adult  Goal: Achieves optimal ventilation and oxygenation  Outcome: Progressing     Problem: Skin/Tissue Integrity  Goal: Absence of new skin breakdown  Description: 1.  Monitor for areas of redness and/or skin breakdown  2.  Assess vascular access sites hourly  3.  Every 4-6 hours minimum:  Change oxygen saturation probe site  4.  Every 4-6 hours:  If on nasal continuous positive airway pressure, respiratory therapy assess nares and determine need for appliance change or resting period.  Outcome: Progressing     Problem: Safety - Adult  Goal: Free from fall injury  Outcome: Progressing     Problem: 
Pt worked with PT/OT today. Pt still having loose stools. Pt had complaints of pain treated with prn percocet. Pt satisfied. Pt had complaints of itching, treated with prn benadryl. Pt safety maintained. Pt dailey to be pulled in AM. Dailey draining, Tea colored urine. Pt verbally aggressive to staff at times, but overall pleasant. Pt waiting on precert to [place]. Pt finished antibiotic today. Patient having pain on their generalized and rates it a 8. Pain interventions includefrequent position changes, correct body alignment/body mechanics, relaxation techniques, medication, pillow support/positioning, diversional activities, regular rest periods, medicate per physician recommendation/order, medicate at the onset of pain before it interferes with regular functions , and medicate before exercise/activity. Patients goal for pain relief is 2. The need for pain and symptom management will be considered in the discharge planning process to ensure patients comfort.    Problem: Discharge Planning  Goal: Discharge to home or other facility with appropriate resources  Outcome: Progressing     Problem: Pain  Goal: Verbalizes/displays adequate comfort level or baseline comfort level  Outcome: Progressing     Problem: Respiratory - Adult  Goal: Achieves optimal ventilation and oxygenation  Outcome: Progressing     Problem: Skin/Tissue Integrity  Goal: Absence of new skin breakdown  Description: 1.  Monitor for areas of redness and/or skin breakdown  2.  Assess vascular access sites hourly  3.  Every 4-6 hours minimum:  Change oxygen saturation probe site  4.  Every 4-6 hours:  If on nasal continuous positive airway pressure, respiratory therapy assess nares and determine need for appliance change or resting period.  Outcome: Progressing     Problem: Safety - Adult  Goal: Free from fall injury  Outcome: Progressing     Problem: Chronic Conditions and Co-morbidities  Goal: Patient's chronic conditions and co-morbidity 
Gian RN  Outcome: Progressing     Problem: Nutrition Deficit:  Goal: Optimize nutritional status  9/27/2024 2254 by Gian Davis, RN  Outcome: Progressing     
Integrity - Adult  Goal: Skin integrity remains intact  Outcome: Progressing     Problem: Nutrition Deficit:  Goal: Optimize nutritional status  Outcome: Progressing

## 2024-09-29 NOTE — DISCHARGE INSTR - DIET

## 2024-09-29 NOTE — DISCHARGE SUMMARY
[facility]  Office: [phone]  Baldomero Maldonado DO, Luis Valdivia, DO, Eric Rojas DO, Rajinder Chiu, DO, Erica Daugherty MD, Carolina Hernandez MD, Haleigh Sorto MD, Hannah Blum MD,  Isael Velazco MD, Cora Coughlin MD, Alex Ferrell MD,  Chela Doan DO, Trent Kearns MD, Manolo Potter MD, Hans Maldonado DO, Mei Tolbert MD,  Gianni De La Rosa DO, Aranza Wakefield MD, Magdalena Dill MD, Mary Ann Cheek MD, Jenny Williamson MD,  Panfilo Hall MD, Dorcas Gillis MD, Nato uGnter MD, Opal Davis MD, Red Small MD, Giana Larsen MD, Trenton Wong, DO, Roberth Eason DO, Carmelo Conroy DO, Joe Franklin MD, Shirley Waterhouse, CNP,  Xena Harrell CNP, Trenton Campbell, CNP,  Celena Corona, [facility], Terrie Mathur, CNP, Dalia Lopes, CNP, Chiquita Manuel, CNP, Anh Kolb, CNP, Geetha Gold, PA-C, Rubina Miller PA-C, Gris Thorpe, CNP, Reymundo Todd, CNP,  Cristal Padilla, CNP, Elizabeth Clemente, CNP, Kelin Hernandez, CNP, Lea Fletcher, CNS, Maria Esther Silverio, [facility], Norma Soliman, CNP, Tracy Schwab, CNP         [facility]   IN-PATIENT SERVICE      Discharge Summary     Patient ID: Nicolas Cardenas  :  1955   MRN: [id number]     ACCOUNT:  [id number]   Patient's PCP: Rosa Pond APRN - NP  Admit Date: 2024   Discharge Date: 2024     Length of Stay: 17  Code Status:  Full Code  Admitting Physician: Arvind Pennington MD  Discharge Physician: Arvind Pennington MD     Active Discharge Diagnoses:     Hospital Problem Lists:  Principal Problem:    Sepsis (HCC)  Active Problems:    DM type 2 with diabetic peripheral neuropathy (HCC)    COPD without exacerbation (HCC)    Acute kidney injury superimposed on CKD (HCC)    S/P BKA (below knee amputation) bilateral (HCC)    Urinary retention    Acute cystitis without hematuria    Secondary hypercoagulable state (HCC)    Chronic pancreatitis (HCC)    Urinary tract infection without hematuria    Dysuria    Clostridioides difficile

## 2024-09-30 ENCOUNTER — HOSPITAL ENCOUNTER (OUTPATIENT)
Age: 69
Setting detail: SPECIMEN
Discharge: HOME | End: 2024-09-30

## 2024-09-30 LAB
ANION GAP SERPL CALCULATED.3IONS-SCNC: 11 MMOL/L (ref 9–17)
BUN SERPL-MCNC: 27 MG/DL (ref 8–23)
BUN/CREAT SERPL: 25 (ref 9–20)
CALCIUM SERPL-MCNC: 8.7 MG/DL (ref 8.6–10.4)
CHLORIDE SERPL-SCNC: 107 MMOL/L (ref 98–107)
CO2 SERPL-SCNC: 22 MMOL/L (ref 20–31)
CREAT SERPL-MCNC: 1.1 MG/DL (ref 0.7–1.2)
ERYTHROCYTE [DISTWIDTH] IN BLOOD BY AUTOMATED COUNT: 14.4 % (ref 11.8–14.4)
EST. AVERAGE GLUCOSE BLD GHB EST-MCNC: 166 MG/DL
GFR, ESTIMATED: 74 ML/MIN/1.73M2
GLUCOSE SERPL-MCNC: 144 MG/DL (ref 70–99)
HBA1C MFR BLD: 7.4 % (ref 4–6)
HCT VFR BLD AUTO: 31.1 % (ref 40.7–50.3)
HGB BLD-MCNC: 9.1 G/DL (ref 13–17)
MAGNESIUM SERPL-MCNC: 2.2 MG/DL (ref 1.6–2.6)
MCH RBC QN AUTO: 28.4 PG (ref 25.2–33.5)
MCHC RBC AUTO-ENTMCNC: 29.3 G/DL (ref 28.4–34.8)
MCV RBC AUTO: 97.2 FL (ref 82.6–102.9)
NRBC BLD-RTO: 0 PER 100 WBC
PLATELET # BLD AUTO: 438 K/UL (ref 138–453)
PMV BLD AUTO: 9.4 FL (ref 8.1–13.5)
POTASSIUM SERPL-SCNC: 4.4 MMOL/L (ref 3.7–5.3)
RBC # BLD AUTO: 3.2 M/UL (ref 4.21–5.77)
SODIUM SERPL-SCNC: 140 MMOL/L (ref 135–144)
T4 SERPL-MCNC: 5.9 UG/DL (ref 4.5–11.7)
TSH SERPL DL<=0.05 MIU/L-ACNC: 0.93 UIU/ML (ref 0.3–5)
WBC OTHER # BLD: 12.2 K/UL (ref 3.5–11.3)

## 2024-09-30 PROCEDURE — P9603 ONE-WAY ALLOW PRORATED MILES: HCPCS

## 2024-09-30 PROCEDURE — 83735 ASSAY OF MAGNESIUM: CPT

## 2024-09-30 PROCEDURE — 85027 COMPLETE CBC AUTOMATED: CPT

## 2024-09-30 PROCEDURE — 84443 ASSAY THYROID STIM HORMONE: CPT

## 2024-09-30 PROCEDURE — 36415 COLL VENOUS BLD VENIPUNCTURE: CPT

## 2024-09-30 PROCEDURE — 80048 BASIC METABOLIC PNL TOTAL CA: CPT

## 2024-09-30 PROCEDURE — 84436 ASSAY OF TOTAL THYROXINE: CPT

## 2024-09-30 PROCEDURE — 83036 HEMOGLOBIN GLYCOSYLATED A1C: CPT

## 2024-10-01 ENCOUNTER — HOSPITAL ENCOUNTER (OUTPATIENT)
Age: 69
Setting detail: SPECIMEN
Discharge: HOME | End: 2024-10-01

## 2024-10-01 LAB
INR PPP: 1.5
PROTHROMBIN TIME: 18 SEC (ref 11.5–14.2)

## 2024-10-01 PROCEDURE — P9603 ONE-WAY ALLOW PRORATED MILES: HCPCS

## 2024-10-01 PROCEDURE — 36415 COLL VENOUS BLD VENIPUNCTURE: CPT

## 2024-10-01 PROCEDURE — 85610 PROTHROMBIN TIME: CPT

## 2024-10-02 ENCOUNTER — HOSPITAL ENCOUNTER (OUTPATIENT)
Age: 69
Setting detail: SPECIMEN
Discharge: HOME | End: 2024-10-02

## 2024-10-02 LAB
ANION GAP SERPL CALCULATED.3IONS-SCNC: 11 MMOL/L (ref 9–17)
BUN SERPL-MCNC: 29 MG/DL (ref 8–23)
BUN/CREAT SERPL: 24 (ref 9–20)
CALCIUM SERPL-MCNC: 8.4 MG/DL (ref 8.6–10.4)
CHLORIDE SERPL-SCNC: 103 MMOL/L (ref 98–107)
CO2 SERPL-SCNC: 23 MMOL/L (ref 20–31)
CREAT SERPL-MCNC: 1.2 MG/DL (ref 0.7–1.2)
GFR, ESTIMATED: 67 ML/MIN/1.73M2
GLUCOSE SERPL-MCNC: 245 MG/DL (ref 70–99)
POTASSIUM SERPL-SCNC: 4.3 MMOL/L (ref 3.7–5.3)
SODIUM SERPL-SCNC: 137 MMOL/L (ref 135–144)
STONE COMPOSITION: NORMAL
STONE DESCRIPTION: NORMAL
STONE MASS: 3 MG
UROTHELIAL CANCER DETECTION: NORMAL

## 2024-10-02 PROCEDURE — P9603 ONE-WAY ALLOW PRORATED MILES: HCPCS

## 2024-10-02 PROCEDURE — 80048 BASIC METABOLIC PNL TOTAL CA: CPT

## 2024-10-02 PROCEDURE — 36415 COLL VENOUS BLD VENIPUNCTURE: CPT

## 2024-10-04 ENCOUNTER — HOSPITAL ENCOUNTER (OUTPATIENT)
Age: 69
Setting detail: SPECIMEN
Discharge: HOME | End: 2024-10-04

## 2024-10-04 LAB
ANION GAP SERPL CALCULATED.3IONS-SCNC: 8 MMOL/L (ref 9–17)
BUN SERPL-MCNC: 32 MG/DL (ref 8–23)
BUN/CREAT SERPL: 29 (ref 9–20)
CALCIUM SERPL-MCNC: 8.8 MG/DL (ref 8.6–10.4)
CHLORIDE SERPL-SCNC: 106 MMOL/L (ref 98–107)
CO2 SERPL-SCNC: 27 MMOL/L (ref 20–31)
CREAT SERPL-MCNC: 1.1 MG/DL (ref 0.7–1.2)
GFR, ESTIMATED: 74 ML/MIN/1.73M2
GLUCOSE SERPL-MCNC: 129 MG/DL (ref 70–99)
POTASSIUM SERPL-SCNC: 4.4 MMOL/L (ref 3.7–5.3)
SODIUM SERPL-SCNC: 141 MMOL/L (ref 135–144)

## 2024-10-04 PROCEDURE — 36415 COLL VENOUS BLD VENIPUNCTURE: CPT

## 2024-10-04 PROCEDURE — 80048 BASIC METABOLIC PNL TOTAL CA: CPT

## 2024-10-04 PROCEDURE — P9603 ONE-WAY ALLOW PRORATED MILES: HCPCS

## 2024-10-07 ENCOUNTER — HOSPITAL ENCOUNTER (OUTPATIENT)
Age: 69
Setting detail: SPECIMEN
Discharge: HOME | End: 2024-10-07

## 2024-10-07 LAB
ANION GAP SERPL CALCULATED.3IONS-SCNC: 10 MMOL/L (ref 9–17)
BUN SERPL-MCNC: 30 MG/DL (ref 8–23)
BUN/CREAT SERPL: 21 (ref 9–20)
CALCIUM SERPL-MCNC: 8.6 MG/DL (ref 8.6–10.4)
CHLORIDE SERPL-SCNC: 101 MMOL/L (ref 98–107)
CO2 SERPL-SCNC: 26 MMOL/L (ref 20–31)
CREAT SERPL-MCNC: 1.4 MG/DL (ref 0.7–1.2)
GFR, ESTIMATED: 55 ML/MIN/1.73M2
GLUCOSE SERPL-MCNC: 225 MG/DL (ref 70–99)
POTASSIUM SERPL-SCNC: 5 MMOL/L (ref 3.7–5.3)
SODIUM SERPL-SCNC: 137 MMOL/L (ref 135–144)

## 2024-10-07 PROCEDURE — 80048 BASIC METABOLIC PNL TOTAL CA: CPT

## 2024-10-07 PROCEDURE — P9603 ONE-WAY ALLOW PRORATED MILES: HCPCS

## 2024-10-07 PROCEDURE — 36415 COLL VENOUS BLD VENIPUNCTURE: CPT

## 2024-10-11 ENCOUNTER — APPOINTMENT (OUTPATIENT)
Dept: GENERAL RADIOLOGY | Age: 69
DRG: 659 | End: 2024-10-11
Payer: COMMERCIAL

## 2024-10-11 ENCOUNTER — HOSPITAL ENCOUNTER (OUTPATIENT)
Age: 69
Setting detail: OUTPATIENT SURGERY
Discharge: HOME | DRG: 659 | End: 2024-10-11
Attending: UROLOGY | Admitting: UROLOGY
Payer: COMMERCIAL

## 2024-10-11 ENCOUNTER — APPOINTMENT (OUTPATIENT)
Dept: CT IMAGING | Age: 69
DRG: 659 | End: 2024-10-11
Payer: COMMERCIAL

## 2024-10-11 ENCOUNTER — HOSPITAL ENCOUNTER (INPATIENT)
Age: 69
LOS: 10 days | Discharge: HOME | DRG: 659 | End: 2024-10-21
Attending: EMERGENCY MEDICINE
Payer: COMMERCIAL

## 2024-10-11 VITALS
HEIGHT: 73 IN | RESPIRATION RATE: 14 BRPM | WEIGHT: 142 LBS | OXYGEN SATURATION: 93 % | DIASTOLIC BLOOD PRESSURE: 57 MMHG | TEMPERATURE: 97.7 F | BODY MASS INDEX: 18.82 KG/M2 | SYSTOLIC BLOOD PRESSURE: 126 MMHG | HEART RATE: 87 BPM

## 2024-10-11 DIAGNOSIS — R09.02 HYPOXEMIA: ICD-10-CM

## 2024-10-11 DIAGNOSIS — N17.9 ACUTE KIDNEY INJURY SUPERIMPOSED ON CKD (HCC): ICD-10-CM

## 2024-10-11 DIAGNOSIS — B37.9 CANDIDA INFECTION: ICD-10-CM

## 2024-10-11 DIAGNOSIS — N20.0 KIDNEY CALCULI: ICD-10-CM

## 2024-10-11 DIAGNOSIS — J18.9 COMMUNITY ACQUIRED PNEUMONIA, UNSPECIFIED LATERALITY: Primary | ICD-10-CM

## 2024-10-11 DIAGNOSIS — L08.9 TYPE 1 DIABETES MELLITUS WITH DIABETIC FOOT INFECTION (HCC): ICD-10-CM

## 2024-10-11 DIAGNOSIS — E87.1 HYPONATREMIA: ICD-10-CM

## 2024-10-11 DIAGNOSIS — Z89.512 HISTORY OF BELOW KNEE AMPUTATION, LEFT (HCC): ICD-10-CM

## 2024-10-11 DIAGNOSIS — N18.9 ACUTE KIDNEY INJURY SUPERIMPOSED ON CKD (HCC): ICD-10-CM

## 2024-10-11 DIAGNOSIS — E10.628 TYPE 1 DIABETES MELLITUS WITH DIABETIC FOOT INFECTION (HCC): ICD-10-CM

## 2024-10-11 PROBLEM — J96.01 ACUTE HYPOXEMIC RESPIRATORY FAILURE: Status: ACTIVE | Noted: 2024-10-11

## 2024-10-11 LAB
ALBUMIN SERPL-MCNC: 3 G/DL (ref 3.5–5.2)
ALP SERPL-CCNC: 167 U/L (ref 40–129)
ALT SERPL-CCNC: 10 U/L (ref 5–41)
ANION GAP SERPL CALCULATED.3IONS-SCNC: 9 MMOL/L (ref 9–17)
AST SERPL-CCNC: 9 U/L
BACTERIA URNS QL MICRO: ABNORMAL
BASOPHILS # BLD: 0.13 K/UL (ref 0–0.2)
BASOPHILS NFR BLD: 1 % (ref 0–2)
BILIRUB DIRECT SERPL-MCNC: <0.1 MG/DL
BILIRUB INDIRECT SERPL-MCNC: ABNORMAL MG/DL (ref 0–1)
BILIRUB SERPL-MCNC: 0.2 MG/DL (ref 0.3–1.2)
BILIRUB UR QL STRIP: NEGATIVE
BUN SERPL-MCNC: 34 MG/DL (ref 8–23)
BUN/CREAT SERPL: 21 (ref 9–20)
CALCIUM SERPL-MCNC: 8.9 MG/DL (ref 8.6–10.4)
CHLORIDE SERPL-SCNC: 105 MMOL/L (ref 98–107)
CLARITY UR: ABNORMAL
CO2 SERPL-SCNC: 25 MMOL/L (ref 20–31)
COLOR UR: ABNORMAL
CREAT SERPL-MCNC: 1.6 MG/DL (ref 0.7–1.2)
EOSINOPHIL # BLD: 0.56 K/UL (ref 0–0.44)
EOSINOPHILS RELATIVE PERCENT: 4 % (ref 1–4)
EPI CELLS #/AREA URNS HPF: ABNORMAL /HPF (ref 0–5)
ERYTHROCYTE [DISTWIDTH] IN BLOOD BY AUTOMATED COUNT: 14.4 % (ref 11.8–14.4)
GFR, ESTIMATED: 47 ML/MIN/1.73M2
GLUCOSE BLD-MCNC: 161 MG/DL (ref 75–110)
GLUCOSE BLD-MCNC: 173 MG/DL (ref 75–110)
GLUCOSE SERPL-MCNC: 180 MG/DL (ref 70–99)
GLUCOSE UR STRIP-MCNC: NEGATIVE MG/DL
HCT VFR BLD AUTO: 31.6 % (ref 40.7–50.3)
HGB BLD-MCNC: 9.3 G/DL (ref 13–17)
HGB UR QL STRIP.AUTO: ABNORMAL
IMM GRANULOCYTES # BLD AUTO: 0.14 K/UL (ref 0–0.3)
IMM GRANULOCYTES NFR BLD: 1 %
INR PPP: 1.2
KETONES UR STRIP-MCNC: NEGATIVE MG/DL
LEUKOCYTE ESTERASE UR QL STRIP: ABNORMAL
LIPASE SERPL-CCNC: 10 U/L (ref 13–60)
LYMPHOCYTES NFR BLD: 2.2 K/UL (ref 1.1–3.7)
LYMPHOCYTES RELATIVE PERCENT: 15 % (ref 24–43)
MAGNESIUM SERPL-MCNC: 2 MG/DL (ref 1.6–2.6)
MCH RBC QN AUTO: 27.6 PG (ref 25.2–33.5)
MCHC RBC AUTO-ENTMCNC: 29.4 G/DL (ref 28.4–34.8)
MCV RBC AUTO: 93.8 FL (ref 82.6–102.9)
MONOCYTES NFR BLD: 1.19 K/UL (ref 0.1–1.2)
MONOCYTES NFR BLD: 8 % (ref 3–12)
NEUTROPHILS NFR BLD: 71 % (ref 36–65)
NEUTS SEG NFR BLD: 10.87 K/UL (ref 1.5–8.1)
NITRITE UR QL STRIP: NEGATIVE
NRBC BLD-RTO: 0 PER 100 WBC
PARTIAL THROMBOPLASTIN TIME: 26.7 SEC (ref 23.9–33.8)
PH UR STRIP: 6 [PH] (ref 5–8)
PHOSPHATE SERPL-MCNC: 4.6 MG/DL (ref 2.5–4.5)
PLATELET # BLD AUTO: 738 K/UL (ref 138–453)
PMV BLD AUTO: 8.5 FL (ref 8.1–13.5)
POTASSIUM SERPL-SCNC: 5.6 MMOL/L (ref 3.7–5.3)
PROT SERPL-MCNC: 7.4 G/DL (ref 6.4–8.3)
PROT UR STRIP-MCNC: ABNORMAL MG/DL
PROTHROMBIN TIME: 14.8 SEC (ref 11.5–14.2)
RBC # BLD AUTO: 3.37 M/UL (ref 4.21–5.77)
RBC #/AREA URNS HPF: ABNORMAL /HPF (ref 0–2)
SODIUM SERPL-SCNC: 139 MMOL/L (ref 135–144)
SP GR UR STRIP: 1.02 (ref 1–1.03)
TROPONIN I SERPL HS-MCNC: 46 NG/L (ref 0–22)
TROPONIN I SERPL HS-MCNC: 49 NG/L (ref 0–22)
UROBILINOGEN UR STRIP-ACNC: NORMAL EU/DL (ref 0–1)
WBC #/AREA URNS HPF: ABNORMAL /HPF (ref 0–5)
WBC OTHER # BLD: 15.1 K/UL (ref 3.5–11.3)

## 2024-10-11 PROCEDURE — 96375 TX/PRO/DX INJ NEW DRUG ADDON: CPT

## 2024-10-11 PROCEDURE — 83735 ASSAY OF MAGNESIUM: CPT

## 2024-10-11 PROCEDURE — 2580000003 HC RX 258: Performed by: ANESTHESIOLOGY

## 2024-10-11 PROCEDURE — 83690 ASSAY OF LIPASE: CPT

## 2024-10-11 PROCEDURE — 6370000000 HC RX 637 (ALT 250 FOR IP)

## 2024-10-11 PROCEDURE — 82947 ASSAY GLUCOSE BLOOD QUANT: CPT

## 2024-10-11 PROCEDURE — 85730 THROMBOPLASTIN TIME PARTIAL: CPT

## 2024-10-11 PROCEDURE — 93005 ELECTROCARDIOGRAM TRACING: CPT | Performed by: EMERGENCY MEDICINE

## 2024-10-11 PROCEDURE — 94760 N-INVAS EAR/PLS OXIMETRY 1: CPT

## 2024-10-11 PROCEDURE — 85610 PROTHROMBIN TIME: CPT

## 2024-10-11 PROCEDURE — 84100 ASSAY OF PHOSPHORUS: CPT

## 2024-10-11 PROCEDURE — 80076 HEPATIC FUNCTION PANEL: CPT

## 2024-10-11 PROCEDURE — 6360000002 HC RX W HCPCS: Performed by: EMERGENCY MEDICINE

## 2024-10-11 PROCEDURE — 99285 EMERGENCY DEPT VISIT HI MDM: CPT

## 2024-10-11 PROCEDURE — 2700000000 HC OXYGEN THERAPY PER DAY

## 2024-10-11 PROCEDURE — 71045 X-RAY EXAM CHEST 1 VIEW: CPT

## 2024-10-11 PROCEDURE — 81001 URINALYSIS AUTO W/SCOPE: CPT

## 2024-10-11 PROCEDURE — 96365 THER/PROPH/DIAG IV INF INIT: CPT

## 2024-10-11 PROCEDURE — 70450 CT HEAD/BRAIN W/O DYE: CPT

## 2024-10-11 PROCEDURE — 74176 CT ABD & PELVIS W/O CONTRAST: CPT

## 2024-10-11 PROCEDURE — 2580000003 HC RX 258: Performed by: EMERGENCY MEDICINE

## 2024-10-11 PROCEDURE — 99223 1ST HOSP IP/OBS HIGH 75: CPT

## 2024-10-11 PROCEDURE — 87086 URINE CULTURE/COLONY COUNT: CPT

## 2024-10-11 PROCEDURE — 96374 THER/PROPH/DIAG INJ IV PUSH: CPT

## 2024-10-11 PROCEDURE — 80048 BASIC METABOLIC PNL TOTAL CA: CPT

## 2024-10-11 PROCEDURE — 6370000000 HC RX 637 (ALT 250 FOR IP): Performed by: EMERGENCY MEDICINE

## 2024-10-11 PROCEDURE — 2580000003 HC RX 258

## 2024-10-11 PROCEDURE — 84484 ASSAY OF TROPONIN QUANT: CPT

## 2024-10-11 PROCEDURE — 1200000000 HC SEMI PRIVATE

## 2024-10-11 PROCEDURE — 85025 COMPLETE CBC W/AUTO DIFF WBC: CPT

## 2024-10-11 RX ORDER — QUETIAPINE FUMARATE 25 MG/1
50 TABLET, FILM COATED ORAL DAILY
Status: DISCONTINUED | OUTPATIENT
Start: 2024-10-12 | End: 2024-10-12

## 2024-10-11 RX ORDER — WARFARIN SODIUM 7.5 MG/1
7.5 TABLET ORAL
Status: DISPENSED | OUTPATIENT
Start: 2024-10-11 | End: 2024-10-12

## 2024-10-11 RX ORDER — SODIUM CHLORIDE 0.9 % (FLUSH) 0.9 %
10 SYRINGE (ML) INJECTION PRN
Status: DISCONTINUED | OUTPATIENT
Start: 2024-10-11 | End: 2024-10-21 | Stop reason: HOSPADM

## 2024-10-11 RX ORDER — CHOLESTYRAMINE LIGHT 4 G/5.7G
4 POWDER, FOR SUSPENSION ORAL DAILY
Status: DISCONTINUED | OUTPATIENT
Start: 2024-10-12 | End: 2024-10-21 | Stop reason: HOSPADM

## 2024-10-11 RX ORDER — DEXTROSE MONOHYDRATE 100 MG/ML
INJECTION, SOLUTION INTRAVENOUS CONTINUOUS PRN
Status: DISCONTINUED | OUTPATIENT
Start: 2024-10-11 | End: 2024-10-18 | Stop reason: SDUPTHER

## 2024-10-11 RX ORDER — POTASSIUM CHLORIDE 1500 MG/1
40 TABLET, EXTENDED RELEASE ORAL ONCE
Status: DISCONTINUED | OUTPATIENT
Start: 2024-10-11 | End: 2024-10-11

## 2024-10-11 RX ORDER — OXYBUTYNIN CHLORIDE 5 MG/1
5 TABLET, EXTENDED RELEASE ORAL NIGHTLY
Status: DISCONTINUED | OUTPATIENT
Start: 2024-10-11 | End: 2024-10-21 | Stop reason: HOSPADM

## 2024-10-11 RX ORDER — OXYCODONE AND ACETAMINOPHEN 5; 325 MG/1; MG/1
1 TABLET ORAL EVERY 4 HOURS PRN
Status: ON HOLD | COMMUNITY
End: 2024-10-16 | Stop reason: HOSPADM

## 2024-10-11 RX ORDER — VANCOMYCIN HYDROCHLORIDE 125 MG/1
125 CAPSULE ORAL 2 TIMES DAILY
Status: COMPLETED | OUTPATIENT
Start: 2024-10-11 | End: 2024-10-15

## 2024-10-11 RX ORDER — IPRATROPIUM BROMIDE AND ALBUTEROL SULFATE 2.5; .5 MG/3ML; MG/3ML
1 SOLUTION RESPIRATORY (INHALATION)
Status: DISCONTINUED | OUTPATIENT
Start: 2024-10-12 | End: 2024-10-11

## 2024-10-11 RX ORDER — SODIUM CHLORIDE 9 MG/ML
INJECTION, SOLUTION INTRAVENOUS PRN
Status: DISCONTINUED | OUTPATIENT
Start: 2024-10-11 | End: 2024-10-21 | Stop reason: HOSPADM

## 2024-10-11 RX ORDER — VANCOMYCIN HYDROCHLORIDE 125 MG/1
125 CAPSULE ORAL DAILY
Status: DISCONTINUED | OUTPATIENT
Start: 2024-10-16 | End: 2024-10-21 | Stop reason: HOSPADM

## 2024-10-11 RX ORDER — SODIUM BICARBONATE 650 MG/1
1300 TABLET ORAL 2 TIMES DAILY
Status: DISCONTINUED | OUTPATIENT
Start: 2024-10-11 | End: 2024-10-21 | Stop reason: HOSPADM

## 2024-10-11 RX ORDER — ONDANSETRON 2 MG/ML
4 INJECTION INTRAMUSCULAR; INTRAVENOUS ONCE
Status: COMPLETED | OUTPATIENT
Start: 2024-10-11 | End: 2024-10-11

## 2024-10-11 RX ORDER — ONDANSETRON 4 MG/1
4 TABLET, ORALLY DISINTEGRATING ORAL EVERY 8 HOURS PRN
Status: DISCONTINUED | OUTPATIENT
Start: 2024-10-11 | End: 2024-10-21 | Stop reason: HOSPADM

## 2024-10-11 RX ORDER — ACETAMINOPHEN 650 MG/1
650 SUPPOSITORY RECTAL EVERY 6 HOURS PRN
Status: DISCONTINUED | OUTPATIENT
Start: 2024-10-11 | End: 2024-10-21 | Stop reason: HOSPADM

## 2024-10-11 RX ORDER — GUAIFENESIN 600 MG/1
600 TABLET, EXTENDED RELEASE ORAL 2 TIMES DAILY
Status: DISCONTINUED | OUTPATIENT
Start: 2024-10-11 | End: 2024-10-21 | Stop reason: HOSPADM

## 2024-10-11 RX ORDER — TRAZODONE HYDROCHLORIDE 50 MG/1
50 TABLET, FILM COATED ORAL NIGHTLY
Status: DISCONTINUED | OUTPATIENT
Start: 2024-10-11 | End: 2024-10-21 | Stop reason: HOSPADM

## 2024-10-11 RX ORDER — POLYETHYLENE GLYCOL 3350 17 G/17G
17 POWDER, FOR SOLUTION ORAL DAILY PRN
Status: DISCONTINUED | OUTPATIENT
Start: 2024-10-11 | End: 2024-10-12

## 2024-10-11 RX ORDER — MAGNESIUM SULFATE 1 G/100ML
1000 INJECTION INTRAVENOUS PRN
Status: DISCONTINUED | OUTPATIENT
Start: 2024-10-11 | End: 2024-10-21 | Stop reason: HOSPADM

## 2024-10-11 RX ORDER — DOCUSATE SODIUM 100 MG/1
100 CAPSULE, LIQUID FILLED ORAL ONCE
Status: COMPLETED | OUTPATIENT
Start: 2024-10-11 | End: 2024-10-11

## 2024-10-11 RX ORDER — ENOXAPARIN SODIUM 100 MG/ML
40 INJECTION SUBCUTANEOUS DAILY
Status: DISCONTINUED | OUTPATIENT
Start: 2024-10-12 | End: 2024-10-16

## 2024-10-11 RX ORDER — METOPROLOL SUCCINATE 25 MG/1
25 TABLET, EXTENDED RELEASE ORAL 2 TIMES DAILY
Status: DISCONTINUED | OUTPATIENT
Start: 2024-10-11 | End: 2024-10-12

## 2024-10-11 RX ORDER — BUSPIRONE HYDROCHLORIDE 5 MG/1
5 TABLET ORAL 2 TIMES DAILY
COMMUNITY

## 2024-10-11 RX ORDER — TAMSULOSIN HYDROCHLORIDE 0.4 MG/1
0.4 CAPSULE ORAL DAILY
Status: DISCONTINUED | OUTPATIENT
Start: 2024-10-12 | End: 2024-10-12

## 2024-10-11 RX ORDER — VANCOMYCIN HYDROCHLORIDE 125 MG/1
125 CAPSULE ORAL EVERY OTHER DAY
Status: DISCONTINUED | OUTPATIENT
Start: 2024-10-26 | End: 2024-10-21 | Stop reason: HOSPADM

## 2024-10-11 RX ORDER — SODIUM CHLORIDE, SODIUM LACTATE, POTASSIUM CHLORIDE, CALCIUM CHLORIDE 600; 310; 30; 20 MG/100ML; MG/100ML; MG/100ML; MG/100ML
INJECTION, SOLUTION INTRAVENOUS CONTINUOUS
Status: DISCONTINUED | OUTPATIENT
Start: 2024-10-11 | End: 2024-10-11 | Stop reason: HOSPADM

## 2024-10-11 RX ORDER — SODIUM CHLORIDE 9 MG/ML
INJECTION, SOLUTION INTRAVENOUS CONTINUOUS
Status: DISCONTINUED | OUTPATIENT
Start: 2024-10-11 | End: 2024-10-11 | Stop reason: HOSPADM

## 2024-10-11 RX ORDER — MORPHINE SULFATE 2 MG/ML
2 INJECTION, SOLUTION INTRAMUSCULAR; INTRAVENOUS ONCE
Status: COMPLETED | OUTPATIENT
Start: 2024-10-11 | End: 2024-10-11

## 2024-10-11 RX ORDER — AZITHROMYCIN 250 MG/1
250 TABLET, FILM COATED ORAL DAILY
Status: COMPLETED | OUTPATIENT
Start: 2024-10-12 | End: 2024-10-15

## 2024-10-11 RX ORDER — IPRATROPIUM BROMIDE AND ALBUTEROL SULFATE 2.5; .5 MG/3ML; MG/3ML
1 SOLUTION RESPIRATORY (INHALATION) EVERY 4 HOURS PRN
Status: DISCONTINUED | OUTPATIENT
Start: 2024-10-11 | End: 2024-10-21 | Stop reason: HOSPADM

## 2024-10-11 RX ORDER — FERROUS SULFATE 325(65) MG
325 TABLET, DELAYED RELEASE (ENTERIC COATED) ORAL
Status: DISCONTINUED | OUTPATIENT
Start: 2024-10-12 | End: 2024-10-21 | Stop reason: HOSPADM

## 2024-10-11 RX ORDER — AMLODIPINE BESYLATE 5 MG/1
5 TABLET ORAL DAILY
Status: DISCONTINUED | OUTPATIENT
Start: 2024-10-12 | End: 2024-10-12

## 2024-10-11 RX ORDER — DOCUSATE SODIUM 100 MG/1
100 CAPSULE, LIQUID FILLED ORAL 2 TIMES DAILY
Status: DISCONTINUED | OUTPATIENT
Start: 2024-10-11 | End: 2024-10-12

## 2024-10-11 RX ORDER — INSULIN GLARGINE 100 [IU]/ML
18 INJECTION, SOLUTION SUBCUTANEOUS DAILY
Status: DISCONTINUED | OUTPATIENT
Start: 2024-10-12 | End: 2024-10-14

## 2024-10-11 RX ORDER — SIMETHICONE 80 MG
80 TABLET,CHEWABLE ORAL EVERY 6 HOURS PRN
COMMUNITY

## 2024-10-11 RX ORDER — ONDANSETRON 2 MG/ML
4 INJECTION INTRAMUSCULAR; INTRAVENOUS EVERY 6 HOURS PRN
Status: DISCONTINUED | OUTPATIENT
Start: 2024-10-11 | End: 2024-10-21 | Stop reason: HOSPADM

## 2024-10-11 RX ORDER — LIDOCAINE HYDROCHLORIDE 10 MG/ML
1 INJECTION, SOLUTION EPIDURAL; INFILTRATION; INTRACAUDAL; PERINEURAL
Status: DISCONTINUED | OUTPATIENT
Start: 2024-10-12 | End: 2024-10-11 | Stop reason: HOSPADM

## 2024-10-11 RX ORDER — TRAZODONE HYDROCHLORIDE 50 MG/1
50 TABLET, FILM COATED ORAL NIGHTLY
COMMUNITY

## 2024-10-11 RX ORDER — NICOTINE 21 MG/24HR
1 PATCH, TRANSDERMAL 24 HOURS TRANSDERMAL DAILY
Status: DISCONTINUED | OUTPATIENT
Start: 2024-10-12 | End: 2024-10-21 | Stop reason: HOSPADM

## 2024-10-11 RX ORDER — METOPROLOL SUCCINATE 25 MG/1
25 TABLET, EXTENDED RELEASE ORAL 2 TIMES DAILY
Status: ON HOLD | COMMUNITY
End: 2024-10-12 | Stop reason: ALTCHOICE

## 2024-10-11 RX ORDER — BUSPIRONE HYDROCHLORIDE 5 MG/1
5 TABLET ORAL 2 TIMES DAILY
Status: DISCONTINUED | OUTPATIENT
Start: 2024-10-11 | End: 2024-10-21 | Stop reason: HOSPADM

## 2024-10-11 RX ORDER — OXYCODONE AND ACETAMINOPHEN 5; 325 MG/1; MG/1
1 TABLET ORAL EVERY 6 HOURS PRN
Status: DISCONTINUED | OUTPATIENT
Start: 2024-10-11 | End: 2024-10-18

## 2024-10-11 RX ORDER — SODIUM POLYSTYRENE SULFONATE 15 G/60ML
30 SUSPENSION ORAL; RECTAL ONCE
Status: COMPLETED | OUTPATIENT
Start: 2024-10-11 | End: 2024-10-11

## 2024-10-11 RX ORDER — SODIUM CHLORIDE 0.9 % (FLUSH) 0.9 %
5-40 SYRINGE (ML) INJECTION EVERY 12 HOURS SCHEDULED
Status: DISCONTINUED | OUTPATIENT
Start: 2024-10-11 | End: 2024-10-21 | Stop reason: HOSPADM

## 2024-10-11 RX ORDER — DOCUSATE SODIUM 100 MG/1
100 CAPSULE, LIQUID FILLED ORAL 2 TIMES DAILY
Status: ON HOLD | COMMUNITY
End: 2024-10-16 | Stop reason: HOSPADM

## 2024-10-11 RX ORDER — ACETAMINOPHEN 325 MG/1
650 TABLET ORAL EVERY 6 HOURS PRN
Status: DISCONTINUED | OUTPATIENT
Start: 2024-10-11 | End: 2024-10-21 | Stop reason: HOSPADM

## 2024-10-11 RX ADMIN — AZITHROMYCIN MONOHYDRATE 500 MG: 500 INJECTION, POWDER, LYOPHILIZED, FOR SOLUTION INTRAVENOUS at 19:26

## 2024-10-11 RX ADMIN — SODIUM CHLORIDE, POTASSIUM CHLORIDE, SODIUM LACTATE AND CALCIUM CHLORIDE: 600; 310; 30; 20 INJECTION, SOLUTION INTRAVENOUS at 13:36

## 2024-10-11 RX ADMIN — METOPROLOL SUCCINATE 25 MG: 25 TABLET, EXTENDED RELEASE ORAL at 23:11

## 2024-10-11 RX ADMIN — SODIUM CHLORIDE, PRESERVATIVE FREE 10 ML: 5 INJECTION INTRAVENOUS at 23:23

## 2024-10-11 RX ADMIN — OXYCODONE AND ACETAMINOPHEN 1 TABLET: 5; 325 TABLET ORAL at 23:11

## 2024-10-11 RX ADMIN — TRAZODONE HYDROCHLORIDE 50 MG: 50 TABLET ORAL at 23:10

## 2024-10-11 RX ADMIN — OXYBUTYNIN CHLORIDE 5 MG: 5 TABLET, EXTENDED RELEASE ORAL at 23:11

## 2024-10-11 RX ADMIN — DOCUSATE SODIUM 100 MG: 100 CAPSULE, LIQUID FILLED ORAL at 20:46

## 2024-10-11 RX ADMIN — MORPHINE SULFATE 2 MG: 2 INJECTION, SOLUTION INTRAMUSCULAR; INTRAVENOUS at 19:27

## 2024-10-11 RX ADMIN — ONDANSETRON 4 MG: 2 INJECTION, SOLUTION INTRAMUSCULAR; INTRAVENOUS at 16:22

## 2024-10-11 RX ADMIN — BUSPIRONE HYDROCHLORIDE 5 MG: 5 TABLET ORAL at 23:11

## 2024-10-11 RX ADMIN — VANCOMYCIN HYDROCHLORIDE 125 MG: 125 CAPSULE ORAL at 23:12

## 2024-10-11 RX ADMIN — CEFTRIAXONE SODIUM 1000 MG: 1 INJECTION, POWDER, FOR SOLUTION INTRAMUSCULAR; INTRAVENOUS at 19:18

## 2024-10-11 RX ADMIN — SODIUM BICARBONATE 1300 MG: 650 TABLET ORAL at 23:10

## 2024-10-11 RX ADMIN — SODIUM POLYSTYRENE SULFONATE 30 G: 15 SUSPENSION ORAL; RECTAL at 23:11

## 2024-10-11 ASSESSMENT — PAIN - FUNCTIONAL ASSESSMENT
PAIN_FUNCTIONAL_ASSESSMENT: 0-10
PAIN_FUNCTIONAL_ASSESSMENT: 0-10

## 2024-10-11 ASSESSMENT — PAIN DESCRIPTION - LOCATION
LOCATION: GENERALIZED
LOCATION: ABDOMEN

## 2024-10-11 ASSESSMENT — PAIN SCALES - GENERAL
PAINLEVEL_OUTOF10: 8
PAINLEVEL_OUTOF10: 8
PAINLEVEL_OUTOF10: 0

## 2024-10-11 ASSESSMENT — HEART SCORE: ECG: NORMAL

## 2024-10-11 NOTE — ED PROVIDER NOTES
Patient was admitted after speaking with the admitting team.  Patient understands and agrees with the plan.  Patient will likely need to be placed again into a different facility.      CRITICAL CARE:       PROCEDURES:    Procedures      DATA FOR LAB AND RADIOLOGY TESTS ORDERED BELOW ARE REVIEWED BY THE ED CLINICIAN:    RADIOLOGY: All x-rays, CT, MRI, and formal ultrasound images (except ED bedside ultrasound) are read by the radiologist, see reports below, unless otherwise noted in MDM or here.  Reports below are reviewed by myself.  CT ABDOMEN PELVIS WO CONTRAST Additional Contrast? None   Final Result   1. Right double-J ureteral stent in place with mild hydronephrosis and   dilatation of the renal pelvis, increased in the interval.  This may   represent stent dysfunction.   2. Bilateral nephrolithiasis and bladder stones again noted.   3. Mild diffuse bladder wall thickening and adjacent fat stranding, which may   be due to cystitis. Correlation with urinalysis recommended.   4. Moderate size hiatal hernia.   5. Dependent linear and clustered nodular opacities in the lower lobes are   noted, similar compared to the prior exam suggesting bronchiolitis and   subsegmental atelectasis.         CT HEAD WO CONTRAST   Final Result   Chronic findings in the brain without acute CT abnormality identified.         XR CHEST PORTABLE   Final Result   1. Streaky opacities at the lung bases bilaterally, likely atelectasis.   2. Nodular opacities within the right mid lung which may represent developing   infectious process.   3. Large hiatal hernia.             LABS: Lab orders shown below, the results are reviewed by myself, and all abnormals are listed below.  Labs Reviewed   URINALYSIS WITH REFLEX TO CULTURE - Abnormal; Notable for the following components:       Result Value    Turbidity UA Cloudy (*)     Urine Hgb 3+ (*)     Protein, UA 3+ (*)     Leukocyte Esterase, Urine LARGE (*)     All other components within normal

## 2024-10-11 NOTE — ED NOTES
RN spoke with Elke with [facility] for information regarding medication list. Elke initially stated that they \"already faxed it\" when asked for clarification since writer is primary nurse and never spoke with facility previously. Elke then stated that she will check with her documentation due to pt being discharged and see if they are still able to pull records. Elke stated she is able to find records and that she will fax them. Elke provided with ED fax number.

## 2024-10-11 NOTE — H&P
infectious or inflammatory in etiology. Correlation with urinalysis is recommended. 3. Bilateral nonobstructive nephrolithiasis. 4. Large hiatal hernia. 5. Tree-in-bud opacities within the bilateral lung bases with mucous plugging in the right lower lobe, which may be related to bronchiolitis or aspiration. 6. Small right pleural effusion.         Diagnosis:      Bladder stones    Plans:     1. CYSTOSCOPY RIGHT  STENT EXCHANGE      MAGDALENO HAILE, MARCELO - CNP  10/11/2024  1:48 PM

## 2024-10-11 NOTE — ED NOTES
Pt presenting to the ED from pre-op. Pt was at [facility] and \"signed out AMA\" per facility, but according to pt, pt did not sign anything and that he is not going back. Pt here for stent, but due to pt not having \"home\", Dr. Gao felt like he should not do the procedure. Upon arrival to ED, pt was 85% on room air. Pt states he is having \"stomach burning\". According to pt's visitors, pt's daughter has contacted the [place] board regarding pt's care that he has received at the facility. According to pt's sister, pt has been on phone and other people have \"heard everything\" regarding pt being mistreated and they \"do not like him there.\"

## 2024-10-11 NOTE — PROGRESS NOTES
Patient arrived with friend Clinton  and caregiver Lorelei.caregiver stated [facility] stated he is ama and they would not give her any information.this nurse called to get home meds and the nurse there stated she could not give me anything due to the patient being ama.then this nurse requested to speak to the head of nursing care that's when I was transferred to his nurse this am at [facility] and she gave me a list of his meds.via fax and when he had them last. This nurse spoke with Lorelei and Daughter to find out what was the final resolution to his discharge from procedure. No answer to where he will be going.Not having a place to go to after this procedure,this nurse spoke with  about the situation and he spoke to the p.o.a/ daughter  and Annette Reinoso the [facility] not [facility] him back and Annette stated they have the right to refuse him back.Lorelei stated she could take him home but she will need help.Gregory sister in in room also.Dr Gao will not do procedure today and will have his office reschedule.when the situation is resolved. Patient discharged . with all belongings to ER per cart.and Iv

## 2024-10-12 ENCOUNTER — APPOINTMENT (OUTPATIENT)
Dept: GENERAL RADIOLOGY | Age: 69
DRG: 659 | End: 2024-10-12
Payer: COMMERCIAL

## 2024-10-12 LAB
ANION GAP SERPL CALCULATED.3IONS-SCNC: 9 MMOL/L (ref 9–17)
BASOPHILS # BLD: 0.1 K/UL (ref 0–0.2)
BASOPHILS NFR BLD: 1 % (ref 0–2)
BUN SERPL-MCNC: 34 MG/DL (ref 8–23)
BUN/CREAT SERPL: 21 (ref 9–20)
CALCIUM SERPL-MCNC: 8.4 MG/DL (ref 8.6–10.4)
CHLORIDE SERPL-SCNC: 102 MMOL/L (ref 98–107)
CO2 SERPL-SCNC: 27 MMOL/L (ref 20–31)
CREAT SERPL-MCNC: 1.6 MG/DL (ref 0.7–1.2)
EKG ATRIAL RATE: 82 BPM
EKG P AXIS: 63 DEGREES
EKG P-R INTERVAL: 128 MS
EKG Q-T INTERVAL: 366 MS
EKG QRS DURATION: 76 MS
EKG QTC CALCULATION (BAZETT): 427 MS
EKG R AXIS: 50 DEGREES
EKG T AXIS: 65 DEGREES
EKG VENTRICULAR RATE: 82 BPM
EOSINOPHIL # BLD: 0.57 K/UL (ref 0–0.44)
EOSINOPHILS RELATIVE PERCENT: 5 % (ref 1–4)
ERYTHROCYTE [DISTWIDTH] IN BLOOD BY AUTOMATED COUNT: 14.3 % (ref 11.8–14.4)
GFR, ESTIMATED: 47 ML/MIN/1.73M2
GLUCOSE BLD-MCNC: 195 MG/DL (ref 75–110)
GLUCOSE BLD-MCNC: 237 MG/DL (ref 75–110)
GLUCOSE BLD-MCNC: 397 MG/DL (ref 75–110)
GLUCOSE SERPL-MCNC: 221 MG/DL (ref 70–99)
HCT VFR BLD AUTO: 29.1 % (ref 40.7–50.3)
HGB BLD-MCNC: 8.6 G/DL (ref 13–17)
IMM GRANULOCYTES # BLD AUTO: 0.07 K/UL (ref 0–0.3)
IMM GRANULOCYTES NFR BLD: 1 %
INR PPP: 1
LYMPHOCYTES NFR BLD: 2.31 K/UL (ref 1.1–3.7)
LYMPHOCYTES RELATIVE PERCENT: 20 % (ref 24–43)
MCH RBC QN AUTO: 27.4 PG (ref 25.2–33.5)
MCHC RBC AUTO-ENTMCNC: 29.6 G/DL (ref 28.4–34.8)
MCV RBC AUTO: 92.7 FL (ref 82.6–102.9)
MONOCYTES NFR BLD: 1.12 K/UL (ref 0.1–1.2)
MONOCYTES NFR BLD: 10 % (ref 3–12)
NEUTROPHILS NFR BLD: 63 % (ref 36–65)
NEUTS SEG NFR BLD: 7.37 K/UL (ref 1.5–8.1)
NRBC BLD-RTO: 0 PER 100 WBC
OSMOLALITY UR: 327 MOSM/KG (ref 80–1300)
PHOSPHATE SERPL-MCNC: 4.6 MG/DL (ref 2.5–4.5)
PLATELET # BLD AUTO: 704 K/UL (ref 138–453)
PMV BLD AUTO: 8.6 FL (ref 8.1–13.5)
POTASSIUM SERPL-SCNC: 4.6 MMOL/L (ref 3.7–5.3)
PROTHROMBIN TIME: 13.7 SEC (ref 11.5–14.2)
RBC # BLD AUTO: 3.14 M/UL (ref 4.21–5.77)
SODIUM SERPL-SCNC: 138 MMOL/L (ref 135–144)
SODIUM UR-SCNC: 67 MMOL/L
WBC OTHER # BLD: 11.5 K/UL (ref 3.5–11.3)

## 2024-10-12 PROCEDURE — 85025 COMPLETE CBC W/AUTO DIFF WBC: CPT

## 2024-10-12 PROCEDURE — 84100 ASSAY OF PHOSPHORUS: CPT

## 2024-10-12 PROCEDURE — 93010 ELECTROCARDIOGRAM REPORT: CPT | Performed by: INTERNAL MEDICINE

## 2024-10-12 PROCEDURE — 80048 BASIC METABOLIC PNL TOTAL CA: CPT

## 2024-10-12 PROCEDURE — 85610 PROTHROMBIN TIME: CPT

## 2024-10-12 PROCEDURE — 83930 ASSAY OF BLOOD OSMOLALITY: CPT

## 2024-10-12 PROCEDURE — 74018 RADEX ABDOMEN 1 VIEW: CPT

## 2024-10-12 PROCEDURE — 6360000002 HC RX W HCPCS

## 2024-10-12 PROCEDURE — 2580000003 HC RX 258

## 2024-10-12 PROCEDURE — 99232 SBSQ HOSP IP/OBS MODERATE 35: CPT | Performed by: INTERNAL MEDICINE

## 2024-10-12 PROCEDURE — 83935 ASSAY OF URINE OSMOLALITY: CPT

## 2024-10-12 PROCEDURE — 6370000000 HC RX 637 (ALT 250 FOR IP)

## 2024-10-12 PROCEDURE — 84300 ASSAY OF URINE SODIUM: CPT

## 2024-10-12 PROCEDURE — 36415 COLL VENOUS BLD VENIPUNCTURE: CPT

## 2024-10-12 PROCEDURE — 82947 ASSAY GLUCOSE BLOOD QUANT: CPT

## 2024-10-12 PROCEDURE — 1200000000 HC SEMI PRIVATE

## 2024-10-12 PROCEDURE — 6370000000 HC RX 637 (ALT 250 FOR IP): Performed by: INTERNAL MEDICINE

## 2024-10-12 RX ORDER — METOPROLOL TARTRATE 25 MG/1
25 TABLET, FILM COATED ORAL 2 TIMES DAILY
COMMUNITY

## 2024-10-12 RX ORDER — TAMSULOSIN HYDROCHLORIDE 0.4 MG/1
0.4 CAPSULE ORAL 2 TIMES DAILY
Status: DISCONTINUED | OUTPATIENT
Start: 2024-10-13 | End: 2024-10-21 | Stop reason: HOSPADM

## 2024-10-12 RX ORDER — METOPROLOL TARTRATE 25 MG/1
25 TABLET, FILM COATED ORAL 2 TIMES DAILY
Status: DISCONTINUED | OUTPATIENT
Start: 2024-10-12 | End: 2024-10-21 | Stop reason: HOSPADM

## 2024-10-12 RX ORDER — WARFARIN SODIUM 10 MG/1
10 TABLET ORAL
Status: COMPLETED | OUTPATIENT
Start: 2024-10-12 | End: 2024-10-12

## 2024-10-12 RX ORDER — QUETIAPINE FUMARATE 25 MG/1
50 TABLET, FILM COATED ORAL 2 TIMES DAILY
Status: DISCONTINUED | OUTPATIENT
Start: 2024-10-13 | End: 2024-10-21 | Stop reason: HOSPADM

## 2024-10-12 RX ORDER — FAMOTIDINE 20 MG/1
20 TABLET, FILM COATED ORAL DAILY
Status: DISCONTINUED | OUTPATIENT
Start: 2024-10-12 | End: 2024-10-21 | Stop reason: HOSPADM

## 2024-10-12 RX ORDER — INSULIN LISPRO 100 [IU]/ML
0-4 INJECTION, SOLUTION INTRAVENOUS; SUBCUTANEOUS
Status: DISCONTINUED | OUTPATIENT
Start: 2024-10-12 | End: 2024-10-21 | Stop reason: HOSPADM

## 2024-10-12 RX ORDER — SIMETHICONE 80 MG
80 TABLET,CHEWABLE ORAL EVERY 6 HOURS PRN
Status: DISCONTINUED | OUTPATIENT
Start: 2024-10-12 | End: 2024-10-13

## 2024-10-12 RX ADMIN — AMLODIPINE BESYLATE 5 MG: 5 TABLET ORAL at 09:39

## 2024-10-12 RX ADMIN — FERROUS SULFATE TAB EC 325 MG (65 MG FE EQUIVALENT) 325 MG: 325 (65 FE) TABLET DELAYED RESPONSE at 09:39

## 2024-10-12 RX ADMIN — PANCRELIPASE LIPASE, PANCRELIPASE PROTEASE, PANCRELIPASE AMYLASE 20000 UNITS: 20000; 63000; 84000 CAPSULE, DELAYED RELEASE ORAL at 11:53

## 2024-10-12 RX ADMIN — SODIUM CHLORIDE, PRESERVATIVE FREE 10 ML: 5 INJECTION INTRAVENOUS at 09:45

## 2024-10-12 RX ADMIN — PANCRELIPASE LIPASE, PANCRELIPASE PROTEASE, PANCRELIPASE AMYLASE 5000 UNITS: 5000; 17000; 24000 CAPSULE, DELAYED RELEASE ORAL at 11:53

## 2024-10-12 RX ADMIN — OXYCODONE AND ACETAMINOPHEN 1 TABLET: 5; 325 TABLET ORAL at 19:46

## 2024-10-12 RX ADMIN — OXYBUTYNIN CHLORIDE 5 MG: 5 TABLET, EXTENDED RELEASE ORAL at 22:08

## 2024-10-12 RX ADMIN — TRAZODONE HYDROCHLORIDE 50 MG: 50 TABLET ORAL at 22:08

## 2024-10-12 RX ADMIN — ENOXAPARIN SODIUM 40 MG: 100 INJECTION SUBCUTANEOUS at 09:40

## 2024-10-12 RX ADMIN — WATER 1000 MG: 1 INJECTION INTRAMUSCULAR; INTRAVENOUS; SUBCUTANEOUS at 19:46

## 2024-10-12 RX ADMIN — PANCRELIPASE LIPASE, PANCRELIPASE PROTEASE, PANCRELIPASE AMYLASE 5000 UNITS: 5000; 17000; 24000 CAPSULE, DELAYED RELEASE ORAL at 18:17

## 2024-10-12 RX ADMIN — INSULIN LISPRO 4 UNITS: 100 INJECTION, SOLUTION INTRAVENOUS; SUBCUTANEOUS at 13:14

## 2024-10-12 RX ADMIN — OXYCODONE AND ACETAMINOPHEN 1 TABLET: 5; 325 TABLET ORAL at 07:59

## 2024-10-12 RX ADMIN — TAMSULOSIN HYDROCHLORIDE 0.4 MG: 0.4 CAPSULE ORAL at 09:39

## 2024-10-12 RX ADMIN — PANCRELIPASE LIPASE, PANCRELIPASE PROTEASE, PANCRELIPASE AMYLASE 20000 UNITS: 20000; 63000; 84000 CAPSULE, DELAYED RELEASE ORAL at 18:17

## 2024-10-12 RX ADMIN — VANCOMYCIN HYDROCHLORIDE 125 MG: 125 CAPSULE ORAL at 13:14

## 2024-10-12 RX ADMIN — WARFARIN SODIUM 10 MG: 10 TABLET ORAL at 18:17

## 2024-10-12 RX ADMIN — FAMOTIDINE 20 MG: 20 TABLET, FILM COATED ORAL at 11:53

## 2024-10-12 RX ADMIN — BUSPIRONE HYDROCHLORIDE 5 MG: 5 TABLET ORAL at 09:39

## 2024-10-12 RX ADMIN — BUSPIRONE HYDROCHLORIDE 5 MG: 5 TABLET ORAL at 22:08

## 2024-10-12 RX ADMIN — INSULIN LISPRO 1 UNITS: 100 INJECTION, SOLUTION INTRAVENOUS; SUBCUTANEOUS at 18:16

## 2024-10-12 RX ADMIN — AZITHROMYCIN DIHYDRATE 250 MG: 250 TABLET ORAL at 09:49

## 2024-10-12 RX ADMIN — GUAIFENESIN 600 MG: 600 TABLET ORAL at 13:14

## 2024-10-12 RX ADMIN — GUAIFENESIN 600 MG: 600 TABLET ORAL at 22:07

## 2024-10-12 RX ADMIN — SODIUM BICARBONATE 1300 MG: 650 TABLET ORAL at 22:08

## 2024-10-12 RX ADMIN — CHOLESTYRAMINE 4 G: 4 POWDER, FOR SUSPENSION ORAL at 09:40

## 2024-10-12 RX ADMIN — METOPROLOL SUCCINATE 25 MG: 25 TABLET, EXTENDED RELEASE ORAL at 09:39

## 2024-10-12 RX ADMIN — INSULIN LISPRO 1 UNITS: 100 INJECTION, SOLUTION INTRAVENOUS; SUBCUTANEOUS at 22:08

## 2024-10-12 RX ADMIN — SODIUM BICARBONATE 1300 MG: 650 TABLET ORAL at 13:15

## 2024-10-12 RX ADMIN — SODIUM CHLORIDE, PRESERVATIVE FREE 10 ML: 5 INJECTION INTRAVENOUS at 22:08

## 2024-10-12 RX ADMIN — METOPROLOL TARTRATE 25 MG: 25 TABLET, FILM COATED ORAL at 22:08

## 2024-10-12 RX ADMIN — QUETIAPINE FUMARATE 50 MG: 25 TABLET ORAL at 09:39

## 2024-10-12 RX ADMIN — INSULIN GLARGINE 18 UNITS: 100 INJECTION, SOLUTION SUBCUTANEOUS at 10:00

## 2024-10-12 RX ADMIN — VANCOMYCIN HYDROCHLORIDE 125 MG: 125 CAPSULE ORAL at 18:17

## 2024-10-12 ASSESSMENT — PAIN DESCRIPTION - DESCRIPTORS: DESCRIPTORS: BURNING;CRAMPING

## 2024-10-12 ASSESSMENT — PAIN SCALES - GENERAL
PAINLEVEL_OUTOF10: 0
PAINLEVEL_OUTOF10: 7
PAINLEVEL_OUTOF10: 8
PAINLEVEL_OUTOF10: 6

## 2024-10-12 ASSESSMENT — PAIN DESCRIPTION - LOCATION: LOCATION: ABDOMEN

## 2024-10-12 NOTE — CARE COORDINATION
Social work phoned [address] [place]. Unable to leave voicemail due to mailbox being full. Yes

## 2024-10-12 NOTE — ED NOTES
ED TO INPATIENT SBAR HANDOFF    Patient Name: Nicolas Cardenas   :  1955  69 y.o.   MRN:  [id number]  Preferred Name    ED Room #:  STA21/21  Family/Caregiver Present no   Restraints no   Sitter no   Sepsis Risk Score      Situation  Code Status: Prior Limited Code details: Intubation/Re-intubation No Comment; Defibrillation/Cardioversion No Comment; Chest Compressions No Comment; Resuscitative Medications No Comment; Other No Comment.    Allergies: Ativan [lorazepam], Gabapentin, and Other  Weight: No data found.  Arrived from:   Chief Complaint:   Chief Complaint   Patient presents with    Altered Mental Status    Urology refused to do procedure due to pt's condition     Hospital Problem/Diagnosis:  Principal Problem:    Community acquired pneumonia of right lower lobe of lung  Resolved Problems:    * No resolved hospital problems. *    Imaging:   CT ABDOMEN PELVIS WO CONTRAST Additional Contrast? None   Final Result   1. Right double-J ureteral stent in place with mild hydronephrosis and   dilatation of the renal pelvis, increased in the interval.  This may   represent stent dysfunction.   2. Bilateral nephrolithiasis and bladder stones again noted.   3. Mild diffuse bladder wall thickening and adjacent fat stranding, which may   be due to cystitis. Correlation with urinalysis recommended.   4. Moderate size hiatal hernia.   5. Dependent linear and clustered nodular opacities in the lower lobes are   noted, similar compared to the prior exam suggesting bronchiolitis and   subsegmental atelectasis.         CT HEAD WO CONTRAST   Final Result   Chronic findings in the brain without acute CT abnormality identified.         XR CHEST PORTABLE   Final Result   1. Streaky opacities at the lung bases bilaterally, likely atelectasis.   2. Nodular opacities within the right mid lung which may represent developing   infectious process.   3. Large hiatal hernia.           Abnormal labs:   Abnormal Labs Reviewed

## 2024-10-12 NOTE — RT PROTOCOL NOTE
RT Inhaler-Nebulizer Bronchodilator Protocol Note    There is a bronchodilator order in the chart from a provider indicating to follow the RT Bronchodilator Protocol and there is an “Initiate RT Inhaler-Nebulizer Bronchodilator Protocol” order as well (see protocol at bottom of note).    CXR Findings:  XR CHEST PORTABLE    Result Date: 10/11/2024  1. Streaky opacities at the lung bases bilaterally, likely atelectasis. 2. Nodular opacities within the right mid lung which may represent developing infectious process. 3. Large hiatal hernia.       The findings from the last RT Protocol Assessment were as follows:   History Pulmonary Disease: Chronic pulmonary disease  Respiratory Pattern: Regular pattern and RR 12-20 bpm  Breath Sounds: Clear breath sounds  Cough: Strong, spontaneous, non-productive  Indication for Bronchodilator Therapy: On home bronchodilators  Bronchodilator Assessment Score: 2    Aerosolized bronchodilator medication orders have been revised according to the RT Inhaler-Nebulizer Bronchodilator Protocol below.    Respiratory Therapist to perform RT Therapy Protocol Assessment initially then follow the protocol.  Repeat RT Therapy Protocol Assessment PRN for score 0-3 or on second treatment, BID, and PRN for scores above 3.    No Indications - adjust the frequency to every 6 hours PRN wheezing or bronchospasm, if no treatments needed after 48 hours then discontinue using Per Protocol order mode.     If indication present, adjust the RT bronchodilator orders based on the Bronchodilator Assessment Score as indicated below.  Use Inhaler orders unless patient has one or more of the following: on home nebulizer, not able to hold breath for 10 seconds, is not alert and oriented, cannot activate and use MDI correctly, or respiratory rate 25 breaths per minute or more, then use the equivalent nebulizer order(s) with same Frequency and PRN reasons based on the score.  If a patient is on this medication at home

## 2024-10-12 NOTE — H&P
[facility]  Office: [phone]  Baldomero Maldonado DO, Luis Valdivia DO, Eric Rojas DO, Rajinder Chiu DO, Erica Daugherty MD, Carolina Hernandez MD, Haleigh Sorto MD, Hannah Blum MD,  Isael Velazco MD, Cora Coughlin MD, Alex Ferrell MD,  Chela Doan DO, Trent Kearns MD, Manolo Potter MD, Hans Maldonado DO, Mei Tolbert MD,  Gianni De La Rosa DO, Aranza Wakefield MD, Magdalena Dill MD, Mary Ann Cheek MD, Jenny Williamson MD,  Panfilo Hall MD, Dorcas Gillis MD, Nato Gunter MD, Opal Davis MD, Red Small MD, Giana Larsen MD, Roberth Eason DO, Carmelo Conroy DO, Marj Davis MD,  Joe Franklin MD, Shirley Waterhouse, CNP,  Xena Harrell, CNP, Trenton Campbell, CNP,  Celena Corona, DNP, Terrie Mathur, CNP, Dalia Lopes, CNP, Kelin Hernandez CNP, Chiquita Manuel, CNP, Anh Kolb, CNP, Geetha Gold, PA-C, Rubina Miller, PA-C, Gris Thorpe, CNP, Paige Higgins, CNP, Elizabeth Clemente, CNP, Lea Fletcher, CNS, Maria Esther Silverio, CNP, Norma Soliman, CNP, Tracy Schwab, CNP         [facility]   IN-PATIENT SERVICE   [facility]    HISTORY AND PHYSICAL EXAMINATION            Date:   10/11/2024  Patient name:  Nicolas Cardenas  Date of admission:  10/11/2024  3:44 PM  MRN:   [id number]  Account:  [id number]  YOB: 1955  PCP:    Rosa Pond APRN - NP  Room:   [facility] ZENY/ZENY  Code Status:    Full Code    Chief Complaint:     Chief Complaint   Patient presents with    Altered Mental Status    Urology refused to do procedure due to pt's condition       History Obtained From:     patient, electronic medical record    History of Present Illness:     Nicolas Cardenas is a 69 y.o. Non- / non  male who presents with Altered Mental Status and Urology refused to do procedure due to pt's condition   and is admitted to the hospital for the management of Community acquired pneumonia of right lower lobe of lung.    Patient presents emergency

## 2024-10-12 NOTE — CARE COORDINATION
Case Management Assessment  Initial Evaluation    Date/Time of Evaluation: 10/12/2024 11:56 AM  Assessment Completed by: Celena Monteiro, MSW, LSW    If patient is discharged prior to next notation, then this note serves as note for discharge by case management.    Patient Name: Nicolas Cardenas                   YOB: 1955  Diagnosis: Hypoxemia [R09.02]  Community acquired pneumonia of right lower lobe of lung [J18.9]  Community acquired pneumonia, unspecified laterality [J18.9]                   Date / Time: 10/11/2024  3:44 PM    Patient Admission Status: Inpatient   Readmission Risk (Low < 19, Mod (19-27), High > 27): Readmission Risk Score: 28.7    Current PCP: Rosa Pond APRN - NP  PCP verified by CM? Yes    Chart Reviewed: Yes      History Provided by: Patient  Patient Orientation: Alert and Oriented    Patient Cognition: Alert    Hospitalization in the last 30 days (Readmission):  Yes    If yes, Readmission Assessment in CM Navigator will be completed.    Advance Directives:      Code Status: Full Code   Patient's Primary Decision Maker is: Named in Scanned ACP Document    Primary Decision Maker: Marisela Ramirez - Child - [phone]    Discharge Planning:    Patient lives with: Alone Type of Home: Skilled Nursing Facility  Primary Care Giver: Self  Patient Support Systems include: Children, Family Members   Current Financial resources: Medicare  Current community resources: ECF/Home Care  Current services prior to admission: Skilled Nursing Facility            Current DME: Walker, Wheelchair, Shower Chair            Type of Home Care services:  Nursing Services    ADLS  Prior functional level: Independent in ADLs/IADLs, Feeding  Current functional level: Independent in ADLs/IADLs, Feeding    PT AM-PAC:   /24  OT AM-PAC:   /24    Family can provide assistance at DC: No  Would you like Case Management to discuss the discharge plan with any other family members/significant others, and if so,

## 2024-10-13 ENCOUNTER — ANESTHESIA EVENT (OUTPATIENT)
Dept: OPERATING ROOM | Age: 69
End: 2024-10-13
Payer: COMMERCIAL

## 2024-10-13 ENCOUNTER — ANESTHESIA (OUTPATIENT)
Dept: OPERATING ROOM | Age: 69
End: 2024-10-13
Payer: COMMERCIAL

## 2024-10-13 ENCOUNTER — APPOINTMENT (OUTPATIENT)
Dept: GENERAL RADIOLOGY | Age: 69
DRG: 659 | End: 2024-10-13
Payer: COMMERCIAL

## 2024-10-13 LAB
ANION GAP SERPL CALCULATED.3IONS-SCNC: 11 MMOL/L (ref 9–17)
BASOPHILS # BLD: 0.1 K/UL (ref 0–0.2)
BASOPHILS NFR BLD: 1 % (ref 0–2)
BILIRUB UR QL STRIP: NEGATIVE
BUN SERPL-MCNC: 35 MG/DL (ref 8–23)
BUN/CREAT SERPL: 23 (ref 9–20)
CALCIUM SERPL-MCNC: 8.8 MG/DL (ref 8.6–10.4)
CHLORIDE SERPL-SCNC: 102 MMOL/L (ref 98–107)
CLARITY UR: ABNORMAL
CO2 SERPL-SCNC: 24 MMOL/L (ref 20–31)
COLOR UR: YELLOW
CREAT SERPL-MCNC: 1.5 MG/DL (ref 0.7–1.2)
EOSINOPHIL # BLD: 0.8 K/UL (ref 0–0.44)
EOSINOPHILS RELATIVE PERCENT: 6 % (ref 1–4)
EPI CELLS #/AREA URNS HPF: NORMAL /HPF (ref 0–5)
ERYTHROCYTE [DISTWIDTH] IN BLOOD BY AUTOMATED COUNT: 14.4 % (ref 11.8–14.4)
GFR, ESTIMATED: 51 ML/MIN/1.73M2
GLUCOSE BLD-MCNC: 110 MG/DL (ref 75–110)
GLUCOSE BLD-MCNC: 118 MG/DL (ref 75–110)
GLUCOSE BLD-MCNC: 134 MG/DL (ref 75–110)
GLUCOSE BLD-MCNC: 177 MG/DL (ref 75–110)
GLUCOSE BLD-MCNC: 196 MG/DL (ref 75–110)
GLUCOSE SERPL-MCNC: 140 MG/DL (ref 70–99)
GLUCOSE UR STRIP-MCNC: NEGATIVE MG/DL
HCT VFR BLD AUTO: 28.8 % (ref 40.7–50.3)
HGB BLD-MCNC: 8.6 G/DL (ref 13–17)
HGB UR QL STRIP.AUTO: ABNORMAL
IMM GRANULOCYTES # BLD AUTO: 0.09 K/UL (ref 0–0.3)
IMM GRANULOCYTES NFR BLD: 1 %
INR PPP: 1
KETONES UR STRIP-MCNC: NEGATIVE MG/DL
LEUKOCYTE ESTERASE UR QL STRIP: ABNORMAL
LYMPHOCYTES NFR BLD: 2.26 K/UL (ref 1.1–3.7)
LYMPHOCYTES RELATIVE PERCENT: 18 % (ref 24–43)
MCH RBC QN AUTO: 27.7 PG (ref 25.2–33.5)
MCHC RBC AUTO-ENTMCNC: 29.9 G/DL (ref 28.4–34.8)
MCV RBC AUTO: 92.6 FL (ref 82.6–102.9)
MICROORGANISM SPEC CULT: NORMAL
MONOCYTES NFR BLD: 1.31 K/UL (ref 0.1–1.2)
MONOCYTES NFR BLD: 10 % (ref 3–12)
NEUTROPHILS NFR BLD: 64 % (ref 36–65)
NEUTS SEG NFR BLD: 8.06 K/UL (ref 1.5–8.1)
NITRITE UR QL STRIP: NEGATIVE
NRBC BLD-RTO: 0 PER 100 WBC
OSMOLALITY SERPL: 306 MOSM/KG (ref 275–295)
PH UR STRIP: 6 [PH] (ref 5–8)
PLATELET # BLD AUTO: 699 K/UL (ref 138–453)
PMV BLD AUTO: 8.9 FL (ref 8.1–13.5)
POTASSIUM SERPL-SCNC: 4.7 MMOL/L (ref 3.7–5.3)
PROT UR STRIP-MCNC: ABNORMAL MG/DL
PROTHROMBIN TIME: 13.6 SEC (ref 11.5–14.2)
RBC # BLD AUTO: 3.11 M/UL (ref 4.21–5.77)
RBC #/AREA URNS HPF: NORMAL /HPF (ref 0–2)
SODIUM SERPL-SCNC: 137 MMOL/L (ref 135–144)
SP GR UR STRIP: 1.01 (ref 1–1.03)
SPECIMEN DESCRIPTION: NORMAL
UROBILINOGEN UR STRIP-ACNC: NORMAL EU/DL (ref 0–1)
WBC #/AREA URNS HPF: NORMAL /HPF (ref 0–5)
WBC OTHER # BLD: 12.6 K/UL (ref 3.5–11.3)

## 2024-10-13 PROCEDURE — 36415 COLL VENOUS BLD VENIPUNCTURE: CPT

## 2024-10-13 PROCEDURE — 2580000003 HC RX 258: Performed by: ANESTHESIOLOGY

## 2024-10-13 PROCEDURE — 87086 URINE CULTURE/COLONY COUNT: CPT

## 2024-10-13 PROCEDURE — 3700000001 HC ADD 15 MINUTES (ANESTHESIA): Performed by: UROLOGY

## 2024-10-13 PROCEDURE — 6370000000 HC RX 637 (ALT 250 FOR IP)

## 2024-10-13 PROCEDURE — 6360000002 HC RX W HCPCS: Performed by: UROLOGY

## 2024-10-13 PROCEDURE — 86403 PARTICLE AGGLUT ANTBDY SCRN: CPT

## 2024-10-13 PROCEDURE — 85610 PROTHROMBIN TIME: CPT

## 2024-10-13 PROCEDURE — 7100000001 HC PACU RECOVERY - ADDTL 15 MIN: Performed by: UROLOGY

## 2024-10-13 PROCEDURE — 6370000000 HC RX 637 (ALT 250 FOR IP): Performed by: UROLOGY

## 2024-10-13 PROCEDURE — 2500000003 HC RX 250 WO HCPCS: Performed by: ANESTHESIOLOGY

## 2024-10-13 PROCEDURE — 2500000003 HC RX 250 WO HCPCS: Performed by: INTERNAL MEDICINE

## 2024-10-13 PROCEDURE — 3700000000 HC ANESTHESIA ATTENDED CARE: Performed by: UROLOGY

## 2024-10-13 PROCEDURE — 0T768DZ DILATION OF RIGHT URETER WITH INTRALUMINAL DEVICE, VIA NATURAL OR ARTIFICIAL OPENING ENDOSCOPIC: ICD-10-PCS | Performed by: UROLOGY

## 2024-10-13 PROCEDURE — 2580000003 HC RX 258: Performed by: UROLOGY

## 2024-10-13 PROCEDURE — 6360000002 HC RX W HCPCS: Performed by: ANESTHESIOLOGY

## 2024-10-13 PROCEDURE — 3600000002 HC SURGERY LEVEL 2 BASE: Performed by: UROLOGY

## 2024-10-13 PROCEDURE — C2617 STENT, NON-COR, TEM W/O DEL: HCPCS | Performed by: UROLOGY

## 2024-10-13 PROCEDURE — C1769 GUIDE WIRE: HCPCS | Performed by: UROLOGY

## 2024-10-13 PROCEDURE — 3600000012 HC SURGERY LEVEL 2 ADDTL 15MIN: Performed by: UROLOGY

## 2024-10-13 PROCEDURE — 81001 URINALYSIS AUTO W/SCOPE: CPT

## 2024-10-13 PROCEDURE — 87186 SC STD MICRODIL/AGAR DIL: CPT

## 2024-10-13 PROCEDURE — 80048 BASIC METABOLIC PNL TOTAL CA: CPT

## 2024-10-13 PROCEDURE — 1200000000 HC SEMI PRIVATE

## 2024-10-13 PROCEDURE — C1758 CATHETER, URETERAL: HCPCS | Performed by: UROLOGY

## 2024-10-13 PROCEDURE — 82947 ASSAY GLUCOSE BLOOD QUANT: CPT

## 2024-10-13 PROCEDURE — 85025 COMPLETE CBC W/AUTO DIFF WBC: CPT

## 2024-10-13 PROCEDURE — 99232 SBSQ HOSP IP/OBS MODERATE 35: CPT | Performed by: INTERNAL MEDICINE

## 2024-10-13 PROCEDURE — 0TCB8ZZ EXTIRPATION OF MATTER FROM BLADDER, VIA NATURAL OR ARTIFICIAL OPENING ENDOSCOPIC: ICD-10-PCS | Performed by: UROLOGY

## 2024-10-13 PROCEDURE — 82365 CALCULUS SPECTROSCOPY: CPT

## 2024-10-13 PROCEDURE — 6370000000 HC RX 637 (ALT 250 FOR IP): Performed by: INTERNAL MEDICINE

## 2024-10-13 PROCEDURE — 7100000000 HC PACU RECOVERY - FIRST 15 MIN: Performed by: UROLOGY

## 2024-10-13 PROCEDURE — 2709999900 HC NON-CHARGEABLE SUPPLY: Performed by: UROLOGY

## 2024-10-13 PROCEDURE — 0TP98DZ REMOVAL OF INTRALUMINAL DEVICE FROM URETER, VIA NATURAL OR ARTIFICIAL OPENING ENDOSCOPIC: ICD-10-PCS | Performed by: UROLOGY

## 2024-10-13 DEVICE — URETERAL STENT
Type: IMPLANTABLE DEVICE | Site: URETER | Status: FUNCTIONAL
Brand: POLARIS™ ULTRA

## 2024-10-13 RX ORDER — HYDROMORPHONE HYDROCHLORIDE 1 MG/ML
0.5 INJECTION, SOLUTION INTRAMUSCULAR; INTRAVENOUS; SUBCUTANEOUS EVERY 5 MIN PRN
Status: DISCONTINUED | OUTPATIENT
Start: 2024-10-13 | End: 2024-10-13 | Stop reason: HOSPADM

## 2024-10-13 RX ORDER — LIDOCAINE HYDROCHLORIDE 20 MG/ML
JELLY TOPICAL PRN
Status: DISCONTINUED | OUTPATIENT
Start: 2024-10-13 | End: 2024-10-13 | Stop reason: ALTCHOICE

## 2024-10-13 RX ORDER — FENTANYL CITRATE 50 UG/ML
25 INJECTION, SOLUTION INTRAMUSCULAR; INTRAVENOUS EVERY 5 MIN PRN
Status: DISCONTINUED | OUTPATIENT
Start: 2024-10-13 | End: 2024-10-13 | Stop reason: HOSPADM

## 2024-10-13 RX ORDER — MAGNESIUM HYDROXIDE 1200 MG/15ML
LIQUID ORAL CONTINUOUS PRN
Status: COMPLETED | OUTPATIENT
Start: 2024-10-13 | End: 2024-10-13

## 2024-10-13 RX ORDER — SIMETHICONE 80 MG
160 TABLET,CHEWABLE ORAL EVERY 6 HOURS PRN
Status: DISCONTINUED | OUTPATIENT
Start: 2024-10-13 | End: 2024-10-21 | Stop reason: HOSPADM

## 2024-10-13 RX ORDER — SODIUM CHLORIDE 0.9 % (FLUSH) 0.9 %
5-40 SYRINGE (ML) INJECTION EVERY 12 HOURS SCHEDULED
Status: DISCONTINUED | OUTPATIENT
Start: 2024-10-13 | End: 2024-10-13 | Stop reason: HOSPADM

## 2024-10-13 RX ORDER — PROCHLORPERAZINE EDISYLATE 5 MG/ML
10 INJECTION INTRAMUSCULAR; INTRAVENOUS
Status: DISCONTINUED | OUTPATIENT
Start: 2024-10-13 | End: 2024-10-13 | Stop reason: HOSPADM

## 2024-10-13 RX ORDER — OXYCODONE HYDROCHLORIDE 5 MG/1
5 TABLET ORAL
Status: DISCONTINUED | OUTPATIENT
Start: 2024-10-13 | End: 2024-10-13 | Stop reason: HOSPADM

## 2024-10-13 RX ORDER — SODIUM CHLORIDE 0.9 % (FLUSH) 0.9 %
5-40 SYRINGE (ML) INJECTION PRN
Status: DISCONTINUED | OUTPATIENT
Start: 2024-10-13 | End: 2024-10-13 | Stop reason: HOSPADM

## 2024-10-13 RX ORDER — NALOXONE HYDROCHLORIDE 0.4 MG/ML
INJECTION, SOLUTION INTRAMUSCULAR; INTRAVENOUS; SUBCUTANEOUS PRN
Status: DISCONTINUED | OUTPATIENT
Start: 2024-10-13 | End: 2024-10-13 | Stop reason: HOSPADM

## 2024-10-13 RX ORDER — CLOTRIMAZOLE AND BETAMETHASONE DIPROPIONATE 10; .64 MG/G; MG/G
CREAM TOPICAL 2 TIMES DAILY
Status: DISCONTINUED | OUTPATIENT
Start: 2024-10-13 | End: 2024-10-21 | Stop reason: HOSPADM

## 2024-10-13 RX ORDER — METOCLOPRAMIDE HYDROCHLORIDE 5 MG/ML
10 INJECTION INTRAMUSCULAR; INTRAVENOUS
Status: DISCONTINUED | OUTPATIENT
Start: 2024-10-13 | End: 2024-10-13 | Stop reason: HOSPADM

## 2024-10-13 RX ORDER — SODIUM CHLORIDE 9 MG/ML
INJECTION, SOLUTION INTRAVENOUS PRN
Status: DISCONTINUED | OUTPATIENT
Start: 2024-10-13 | End: 2024-10-13 | Stop reason: HOSPADM

## 2024-10-13 RX ORDER — WARFARIN SODIUM 10 MG/1
10 TABLET ORAL
Status: COMPLETED | OUTPATIENT
Start: 2024-10-13 | End: 2024-10-13

## 2024-10-13 RX ORDER — DIPHENHYDRAMINE HYDROCHLORIDE 50 MG/ML
12.5 INJECTION INTRAMUSCULAR; INTRAVENOUS
Status: DISCONTINUED | OUTPATIENT
Start: 2024-10-13 | End: 2024-10-13 | Stop reason: HOSPADM

## 2024-10-13 RX ORDER — LIDOCAINE HYDROCHLORIDE 20 MG/ML
INJECTION, SOLUTION EPIDURAL; INFILTRATION; INTRACAUDAL; PERINEURAL
Status: DISCONTINUED | OUTPATIENT
Start: 2024-10-13 | End: 2024-10-13 | Stop reason: SDUPTHER

## 2024-10-13 RX ORDER — CEFAZOLIN SODIUM 1 G/3ML
INJECTION, POWDER, FOR SOLUTION INTRAMUSCULAR; INTRAVENOUS
Status: DISCONTINUED | OUTPATIENT
Start: 2024-10-13 | End: 2024-10-13 | Stop reason: SDUPTHER

## 2024-10-13 RX ORDER — PROPOFOL 10 MG/ML
INJECTION, EMULSION INTRAVENOUS
Status: DISCONTINUED | OUTPATIENT
Start: 2024-10-13 | End: 2024-10-13 | Stop reason: SDUPTHER

## 2024-10-13 RX ORDER — SODIUM CHLORIDE, SODIUM LACTATE, POTASSIUM CHLORIDE, CALCIUM CHLORIDE 600; 310; 30; 20 MG/100ML; MG/100ML; MG/100ML; MG/100ML
INJECTION, SOLUTION INTRAVENOUS
Status: DISCONTINUED | OUTPATIENT
Start: 2024-10-13 | End: 2024-10-13 | Stop reason: SDUPTHER

## 2024-10-13 RX ORDER — MAGNESIUM HYDROXIDE 1200 MG/15ML
3000 LIQUID ORAL ONCE
Status: COMPLETED | OUTPATIENT
Start: 2024-10-13 | End: 2024-10-13

## 2024-10-13 RX ADMIN — OXYCODONE AND ACETAMINOPHEN 1 TABLET: 5; 325 TABLET ORAL at 01:49

## 2024-10-13 RX ADMIN — SODIUM CHLORIDE, PRESERVATIVE FREE 10 ML: 5 INJECTION INTRAVENOUS at 11:42

## 2024-10-13 RX ADMIN — BUSPIRONE HYDROCHLORIDE 5 MG: 5 TABLET ORAL at 21:09

## 2024-10-13 RX ADMIN — INSULIN GLARGINE 18 UNITS: 100 INJECTION, SOLUTION SUBCUTANEOUS at 11:24

## 2024-10-13 RX ADMIN — PANCRELIPASE LIPASE, PANCRELIPASE PROTEASE, PANCRELIPASE AMYLASE 20000 UNITS: 20000; 63000; 84000 CAPSULE, DELAYED RELEASE ORAL at 17:08

## 2024-10-13 RX ADMIN — CLOTRIMAZOLE AND BETAMETHASONE DIPROPIONATE: 10; .5 CREAM TOPICAL at 21:09

## 2024-10-13 RX ADMIN — SODIUM CHLORIDE, PRESERVATIVE FREE 10 ML: 5 INJECTION INTRAVENOUS at 19:30

## 2024-10-13 RX ADMIN — CHOLESTYRAMINE 4 G: 4 POWDER, FOR SUSPENSION ORAL at 11:26

## 2024-10-13 RX ADMIN — TRAZODONE HYDROCHLORIDE 50 MG: 50 TABLET ORAL at 21:09

## 2024-10-13 RX ADMIN — PANCRELIPASE LIPASE, PANCRELIPASE PROTEASE, PANCRELIPASE AMYLASE 20000 UNITS: 20000; 63000; 84000 CAPSULE, DELAYED RELEASE ORAL at 11:27

## 2024-10-13 RX ADMIN — SIMETHICONE 160 MG: 80 TABLET, CHEWABLE ORAL at 22:18

## 2024-10-13 RX ADMIN — WARFARIN SODIUM 10 MG: 10 TABLET ORAL at 17:08

## 2024-10-13 RX ADMIN — VANCOMYCIN HYDROCHLORIDE 125 MG: 125 CAPSULE ORAL at 17:08

## 2024-10-13 RX ADMIN — GUAIFENESIN 600 MG: 600 TABLET ORAL at 11:26

## 2024-10-13 RX ADMIN — OXYBUTYNIN CHLORIDE 5 MG: 5 TABLET, EXTENDED RELEASE ORAL at 21:09

## 2024-10-13 RX ADMIN — METOPROLOL TARTRATE 25 MG: 25 TABLET, FILM COATED ORAL at 21:09

## 2024-10-13 RX ADMIN — AZITHROMYCIN DIHYDRATE 250 MG: 250 TABLET ORAL at 11:27

## 2024-10-13 RX ADMIN — OXYCODONE AND ACETAMINOPHEN 1 TABLET: 5; 325 TABLET ORAL at 11:45

## 2024-10-13 RX ADMIN — BUSPIRONE HYDROCHLORIDE 5 MG: 5 TABLET ORAL at 11:27

## 2024-10-13 RX ADMIN — PANCRELIPASE LIPASE, PANCRELIPASE PROTEASE, PANCRELIPASE AMYLASE 5000 UNITS: 5000; 17000; 24000 CAPSULE, DELAYED RELEASE ORAL at 17:08

## 2024-10-13 RX ADMIN — LIDOCAINE HYDROCHLORIDE 50 MG: 20 INJECTION, SOLUTION EPIDURAL; INFILTRATION; INTRACAUDAL; PERINEURAL at 10:00

## 2024-10-13 RX ADMIN — QUETIAPINE FUMARATE 50 MG: 25 TABLET ORAL at 21:09

## 2024-10-13 RX ADMIN — ANTI-FUNGAL POWDER MICONAZOLE NITRATE TALC FREE: 1.42 POWDER TOPICAL at 16:40

## 2024-10-13 RX ADMIN — TAMSULOSIN HYDROCHLORIDE 0.4 MG: 0.4 CAPSULE ORAL at 11:26

## 2024-10-13 RX ADMIN — SODIUM BICARBONATE 1300 MG: 650 TABLET ORAL at 21:09

## 2024-10-13 RX ADMIN — CLOTRIMAZOLE AND BETAMETHASONE DIPROPIONATE: 10; .5 CREAM TOPICAL at 13:50

## 2024-10-13 RX ADMIN — FAMOTIDINE 20 MG: 20 TABLET, FILM COATED ORAL at 11:28

## 2024-10-13 RX ADMIN — SODIUM CHLORIDE, POTASSIUM CHLORIDE, SODIUM LACTATE AND CALCIUM CHLORIDE: 600; 310; 30; 20 INJECTION, SOLUTION INTRAVENOUS at 09:53

## 2024-10-13 RX ADMIN — SODIUM CHLORIDE 3000 ML: 900 IRRIGANT IRRIGATION at 11:47

## 2024-10-13 RX ADMIN — SODIUM BICARBONATE 1300 MG: 650 TABLET ORAL at 11:28

## 2024-10-13 RX ADMIN — FERROUS SULFATE TAB EC 325 MG (65 MG FE EQUIVALENT) 325 MG: 325 (65 FE) TABLET DELAYED RESPONSE at 11:26

## 2024-10-13 RX ADMIN — PANCRELIPASE LIPASE, PANCRELIPASE PROTEASE, PANCRELIPASE AMYLASE 5000 UNITS: 5000; 17000; 24000 CAPSULE, DELAYED RELEASE ORAL at 11:27

## 2024-10-13 RX ADMIN — METOPROLOL TARTRATE 25 MG: 25 TABLET, FILM COATED ORAL at 11:27

## 2024-10-13 RX ADMIN — SODIUM PHOSPHATE, DIBASIC AND SODIUM PHOSPHATE, MONOBASIC 1 ENEMA: 7; 19 ENEMA RECTAL at 11:34

## 2024-10-13 RX ADMIN — VANCOMYCIN HYDROCHLORIDE 125 MG: 125 CAPSULE ORAL at 11:32

## 2024-10-13 RX ADMIN — SIMETHICONE 80 MG: 80 TABLET, CHEWABLE ORAL at 11:22

## 2024-10-13 RX ADMIN — GUAIFENESIN 600 MG: 600 TABLET ORAL at 21:09

## 2024-10-13 RX ADMIN — INSULIN LISPRO 1 UNITS: 100 INJECTION, SOLUTION INTRAVENOUS; SUBCUTANEOUS at 17:08

## 2024-10-13 RX ADMIN — HYDROMORPHONE HYDROCHLORIDE 0.5 MG: 1 INJECTION, SOLUTION INTRAMUSCULAR; INTRAVENOUS; SUBCUTANEOUS at 10:47

## 2024-10-13 RX ADMIN — HYDROMORPHONE HYDROCHLORIDE 0.5 MG: 1 INJECTION, SOLUTION INTRAMUSCULAR; INTRAVENOUS; SUBCUTANEOUS at 10:55

## 2024-10-13 RX ADMIN — PROPOFOL 100 MCG/KG/MIN: 10 INJECTION, EMULSION INTRAVENOUS at 10:01

## 2024-10-13 RX ADMIN — CEFAZOLIN 2 G: 1 INJECTION, POWDER, FOR SOLUTION INTRAMUSCULAR; INTRAVENOUS at 10:04

## 2024-10-13 RX ADMIN — TAMSULOSIN HYDROCHLORIDE 0.4 MG: 0.4 CAPSULE ORAL at 21:09

## 2024-10-13 RX ADMIN — PROPOFOL 50 MG: 10 INJECTION, EMULSION INTRAVENOUS at 10:00

## 2024-10-13 RX ADMIN — QUETIAPINE FUMARATE 50 MG: 25 TABLET ORAL at 11:27

## 2024-10-13 RX ADMIN — SIMETHICONE 80 MG: 80 TABLET, CHEWABLE ORAL at 05:53

## 2024-10-13 RX ADMIN — WATER 1000 MG: 1 INJECTION INTRAMUSCULAR; INTRAVENOUS; SUBCUTANEOUS at 19:29

## 2024-10-13 RX ADMIN — OXYCODONE AND ACETAMINOPHEN 1 TABLET: 5; 325 TABLET ORAL at 21:19

## 2024-10-13 ASSESSMENT — PAIN DESCRIPTION - FREQUENCY
FREQUENCY: CONTINUOUS
FREQUENCY: CONTINUOUS

## 2024-10-13 ASSESSMENT — PAIN DESCRIPTION - LOCATION
LOCATION: ABDOMEN
LOCATION: ABDOMEN;GENERALIZED
LOCATION: PENIS
LOCATION: ABDOMEN
LOCATION: PENIS;ABDOMEN

## 2024-10-13 ASSESSMENT — PAIN SCALES - GENERAL
PAINLEVEL_OUTOF10: 9
PAINLEVEL_OUTOF10: 8
PAINLEVEL_OUTOF10: 9
PAINLEVEL_OUTOF10: 9
PAINLEVEL_OUTOF10: 5
PAINLEVEL_OUTOF10: 9
PAINLEVEL_OUTOF10: 10
PAINLEVEL_OUTOF10: 9
PAINLEVEL_OUTOF10: 7

## 2024-10-13 ASSESSMENT — PAIN DESCRIPTION - DESCRIPTORS
DESCRIPTORS: CRAMPING;ACHING
DESCRIPTORS: BURNING;CRAMPING;ACHING
DESCRIPTORS: SHARP

## 2024-10-13 ASSESSMENT — PAIN DESCRIPTION - ONSET
ONSET: ON-GOING
ONSET: SUDDEN

## 2024-10-13 ASSESSMENT — PAIN DESCRIPTION - PAIN TYPE
TYPE: SURGICAL PAIN
TYPE: CHRONIC PAIN;SURGICAL PAIN

## 2024-10-13 ASSESSMENT — PAIN DESCRIPTION - ORIENTATION: ORIENTATION: RIGHT;LEFT;MID;UPPER;LOWER

## 2024-10-13 ASSESSMENT — PAIN - FUNCTIONAL ASSESSMENT
PAIN_FUNCTIONAL_ASSESSMENT: PREVENTS OR INTERFERES SOME ACTIVE ACTIVITIES AND ADLS
PAIN_FUNCTIONAL_ASSESSMENT: FACE, LEGS, ACTIVITY, CRY, AND CONSOLABILITY (FLACC)

## 2024-10-13 NOTE — OP NOTE
Operative Note      Patient: Nicolas Cardenas  YOB: 1955  MRN: [id number]    Date of Procedure: 10/13/2024    Pre-Op Diagnosis Codes:      * Kidney calculi [N20.0]  Right ureteral stent  Post-Op Diagnosis: Same and with multiple bladder stones       Procedure(s):  CYSTOSCOPY URETERAL STENT EXCHANGE RIGHT STRING ON,CYSTOLITHOLOPEXY    Surgeon(s):  Nicolas Gao MD    Assistant:   * No surgical staff found *    Anesthesia: General    Estimated Blood Loss (mL): Minimal    Complications: None    Specimens:   ID Type Source Tests Collected by Time Destination   1 : CYSTO URINE Urine Urine, Cystoscopic URINALYSIS WITH REFLEX TO CULTURE Nicolas Gao MD 10/13/2024 1012    2 : BLADDER STONE Stone (Calculus) Bladder STONE ANALYSIS Nicolas Gao MD 10/13/2024 1027        Implants:  Implant Name Type Inv. Item Serial No.  Lot No. LRB No. Used Action   STENT URET 7FR L24CM PERCFLX HYDR+ DBL PGTL THRD 2 - [id number]  STENT URET 7FR L24CM PERCFLX HYDR+ DBL PGTL THRD 2  [facility]- [id number] Right 1 Implanted         Drains:   Urinary Catheter 10/13/24 3 Way (Active)   $ Urethral catheter insertion Inserted for procedure 10/13/24 1039   Catheter Indications Urinary retention (acute or chronic), continuous bladder irrigation or bladder outlet obstruction 10/13/24 1039   Site Assessment No urethral drainage 10/13/24 1039   Urine Color Yellow 10/13/24 1039   Urine Appearance Cloudy 10/13/24 1039   Collection Container Standard 10/13/24 1039   Securement Method Leg strap 10/13/24 1039   Status Draining;Continuous bladder irrigation 10/13/24 1039       [REMOVED] Urinary Catheter 09/17/24 2 Way (Removed)   Catheter Indications Urinary retention (acute or chronic), continuous bladder irrigation or bladder outlet obstruction 09/27/24 0517   Site Assessment Pink 09/27/24 0517   Urine Color Tea;Bloody 09/27/24 0517   Urine Appearance Cloudy 09/27/24 0517   Urine Odor Malodorous 09/27/24 0517

## 2024-10-14 LAB
ANION GAP SERPL CALCULATED.3IONS-SCNC: 9 MMOL/L (ref 9–17)
BASOPHILS # BLD: 0.11 K/UL (ref 0–0.2)
BASOPHILS NFR BLD: 1 % (ref 0–2)
BUN SERPL-MCNC: 27 MG/DL (ref 8–23)
BUN/CREAT SERPL: 19 (ref 9–20)
CALCIUM SERPL-MCNC: 8.7 MG/DL (ref 8.6–10.4)
CHLORIDE SERPL-SCNC: 104 MMOL/L (ref 98–107)
CO2 SERPL-SCNC: 27 MMOL/L (ref 20–31)
CREAT SERPL-MCNC: 1.4 MG/DL (ref 0.7–1.2)
EOSINOPHIL # BLD: 0.57 K/UL (ref 0–0.44)
EOSINOPHILS RELATIVE PERCENT: 3 % (ref 1–4)
ERYTHROCYTE [DISTWIDTH] IN BLOOD BY AUTOMATED COUNT: 14.4 % (ref 11.8–14.4)
GFR, ESTIMATED: 55 ML/MIN/1.73M2
GLUCOSE BLD-MCNC: 127 MG/DL (ref 75–110)
GLUCOSE BLD-MCNC: 202 MG/DL (ref 75–110)
GLUCOSE BLD-MCNC: 241 MG/DL (ref 75–110)
GLUCOSE BLD-MCNC: 342 MG/DL (ref 75–110)
GLUCOSE SERPL-MCNC: 181 MG/DL (ref 70–99)
HCT VFR BLD AUTO: 28.6 % (ref 40.7–50.3)
HGB BLD-MCNC: 8.6 G/DL (ref 13–17)
IMM GRANULOCYTES # BLD AUTO: 0.1 K/UL (ref 0–0.3)
IMM GRANULOCYTES NFR BLD: 1 %
INR PPP: 1.5
LYMPHOCYTES NFR BLD: 2.25 K/UL (ref 1.1–3.7)
LYMPHOCYTES RELATIVE PERCENT: 13 % (ref 24–43)
MCH RBC QN AUTO: 27.8 PG (ref 25.2–33.5)
MCHC RBC AUTO-ENTMCNC: 30.1 G/DL (ref 28.4–34.8)
MCV RBC AUTO: 92.6 FL (ref 82.6–102.9)
MONOCYTES NFR BLD: 1.31 K/UL (ref 0.1–1.2)
MONOCYTES NFR BLD: 8 % (ref 3–12)
NEUTROPHILS NFR BLD: 74 % (ref 36–65)
NEUTS SEG NFR BLD: 12.77 K/UL (ref 1.5–8.1)
NRBC BLD-RTO: 0 PER 100 WBC
PLATELET # BLD AUTO: 698 K/UL (ref 138–453)
PMV BLD AUTO: 8.6 FL (ref 8.1–13.5)
POTASSIUM SERPL-SCNC: 4.2 MMOL/L (ref 3.7–5.3)
POTASSIUM SERPL-SCNC: 5.7 MMOL/L (ref 3.7–5.3)
PROTHROMBIN TIME: 17.8 SEC (ref 11.5–14.2)
RBC # BLD AUTO: 3.09 M/UL (ref 4.21–5.77)
SODIUM SERPL-SCNC: 140 MMOL/L (ref 135–144)
WBC OTHER # BLD: 17.1 K/UL (ref 3.5–11.3)

## 2024-10-14 PROCEDURE — 80048 BASIC METABOLIC PNL TOTAL CA: CPT

## 2024-10-14 PROCEDURE — 85610 PROTHROMBIN TIME: CPT

## 2024-10-14 PROCEDURE — 6360000002 HC RX W HCPCS: Performed by: UROLOGY

## 2024-10-14 PROCEDURE — 6370000000 HC RX 637 (ALT 250 FOR IP): Performed by: UROLOGY

## 2024-10-14 PROCEDURE — 99232 SBSQ HOSP IP/OBS MODERATE 35: CPT | Performed by: NURSE PRACTITIONER

## 2024-10-14 PROCEDURE — 6370000000 HC RX 637 (ALT 250 FOR IP): Performed by: NURSE PRACTITIONER

## 2024-10-14 PROCEDURE — 84132 ASSAY OF SERUM POTASSIUM: CPT

## 2024-10-14 PROCEDURE — 1200000000 HC SEMI PRIVATE

## 2024-10-14 PROCEDURE — 6370000000 HC RX 637 (ALT 250 FOR IP): Performed by: INTERNAL MEDICINE

## 2024-10-14 PROCEDURE — 85025 COMPLETE CBC W/AUTO DIFF WBC: CPT

## 2024-10-14 PROCEDURE — 36415 COLL VENOUS BLD VENIPUNCTURE: CPT

## 2024-10-14 PROCEDURE — 82947 ASSAY GLUCOSE BLOOD QUANT: CPT

## 2024-10-14 PROCEDURE — 2580000003 HC RX 258: Performed by: UROLOGY

## 2024-10-14 RX ORDER — INSULIN GLARGINE 100 [IU]/ML
20 INJECTION, SOLUTION SUBCUTANEOUS DAILY
Status: DISCONTINUED | OUTPATIENT
Start: 2024-10-15 | End: 2024-10-21 | Stop reason: HOSPADM

## 2024-10-14 RX ORDER — DIPHENHYDRAMINE HCL 25 MG
25 TABLET ORAL ONCE
Status: COMPLETED | OUTPATIENT
Start: 2024-10-14 | End: 2024-10-14

## 2024-10-14 RX ORDER — WARFARIN SODIUM 10 MG/1
10 TABLET ORAL
Status: COMPLETED | OUTPATIENT
Start: 2024-10-14 | End: 2024-10-14

## 2024-10-14 RX ADMIN — GUAIFENESIN 600 MG: 600 TABLET ORAL at 21:15

## 2024-10-14 RX ADMIN — VANCOMYCIN HYDROCHLORIDE 125 MG: 125 CAPSULE ORAL at 08:15

## 2024-10-14 RX ADMIN — BUSPIRONE HYDROCHLORIDE 5 MG: 5 TABLET ORAL at 08:12

## 2024-10-14 RX ADMIN — DIPHENHYDRAMINE HCL 25 MG: 25 TABLET ORAL at 22:11

## 2024-10-14 RX ADMIN — PANCRELIPASE LIPASE, PANCRELIPASE PROTEASE, PANCRELIPASE AMYLASE 5000 UNITS: 5000; 17000; 24000 CAPSULE, DELAYED RELEASE ORAL at 16:28

## 2024-10-14 RX ADMIN — METOPROLOL TARTRATE 25 MG: 25 TABLET, FILM COATED ORAL at 21:15

## 2024-10-14 RX ADMIN — PANCRELIPASE LIPASE, PANCRELIPASE PROTEASE, PANCRELIPASE AMYLASE 20000 UNITS: 20000; 63000; 84000 CAPSULE, DELAYED RELEASE ORAL at 11:48

## 2024-10-14 RX ADMIN — METOPROLOL TARTRATE 25 MG: 25 TABLET, FILM COATED ORAL at 08:13

## 2024-10-14 RX ADMIN — PANCRELIPASE LIPASE, PANCRELIPASE PROTEASE, PANCRELIPASE AMYLASE 20000 UNITS: 20000; 63000; 84000 CAPSULE, DELAYED RELEASE ORAL at 16:27

## 2024-10-14 RX ADMIN — TRAZODONE HYDROCHLORIDE 50 MG: 50 TABLET ORAL at 21:15

## 2024-10-14 RX ADMIN — TAMSULOSIN HYDROCHLORIDE 0.4 MG: 0.4 CAPSULE ORAL at 21:15

## 2024-10-14 RX ADMIN — WATER 1000 MG: 1 INJECTION INTRAMUSCULAR; INTRAVENOUS; SUBCUTANEOUS at 18:28

## 2024-10-14 RX ADMIN — SODIUM CHLORIDE, PRESERVATIVE FREE 10 ML: 5 INJECTION INTRAVENOUS at 21:20

## 2024-10-14 RX ADMIN — INSULIN LISPRO 1 UNITS: 100 INJECTION, SOLUTION INTRAVENOUS; SUBCUTANEOUS at 11:48

## 2024-10-14 RX ADMIN — AZITHROMYCIN DIHYDRATE 250 MG: 250 TABLET ORAL at 08:15

## 2024-10-14 RX ADMIN — CLOTRIMAZOLE AND BETAMETHASONE DIPROPIONATE: 10; .5 CREAM TOPICAL at 08:17

## 2024-10-14 RX ADMIN — INSULIN LISPRO 3 UNITS: 100 INJECTION, SOLUTION INTRAVENOUS; SUBCUTANEOUS at 21:21

## 2024-10-14 RX ADMIN — PANCRELIPASE LIPASE, PANCRELIPASE PROTEASE, PANCRELIPASE AMYLASE 5000 UNITS: 5000; 17000; 24000 CAPSULE, DELAYED RELEASE ORAL at 08:14

## 2024-10-14 RX ADMIN — ANTI-FUNGAL POWDER MICONAZOLE NITRATE TALC FREE: 1.42 POWDER TOPICAL at 21:18

## 2024-10-14 RX ADMIN — GUAIFENESIN 600 MG: 600 TABLET ORAL at 08:13

## 2024-10-14 RX ADMIN — ANTI-FUNGAL POWDER MICONAZOLE NITRATE TALC FREE: 1.42 POWDER TOPICAL at 08:18

## 2024-10-14 RX ADMIN — QUETIAPINE FUMARATE 50 MG: 25 TABLET ORAL at 21:15

## 2024-10-14 RX ADMIN — SODIUM ZIRCONIUM CYCLOSILICATE 10 G: 10 POWDER, FOR SUSPENSION ORAL at 10:28

## 2024-10-14 RX ADMIN — ENOXAPARIN SODIUM 40 MG: 100 INJECTION SUBCUTANEOUS at 08:13

## 2024-10-14 RX ADMIN — OXYCODONE AND ACETAMINOPHEN 1 TABLET: 5; 325 TABLET ORAL at 18:32

## 2024-10-14 RX ADMIN — FAMOTIDINE 20 MG: 20 TABLET, FILM COATED ORAL at 08:13

## 2024-10-14 RX ADMIN — QUETIAPINE FUMARATE 50 MG: 25 TABLET ORAL at 08:15

## 2024-10-14 RX ADMIN — PANCRELIPASE LIPASE, PANCRELIPASE PROTEASE, PANCRELIPASE AMYLASE 5000 UNITS: 5000; 17000; 24000 CAPSULE, DELAYED RELEASE ORAL at 11:48

## 2024-10-14 RX ADMIN — BUSPIRONE HYDROCHLORIDE 5 MG: 5 TABLET ORAL at 21:15

## 2024-10-14 RX ADMIN — SIMETHICONE 160 MG: 80 TABLET, CHEWABLE ORAL at 22:11

## 2024-10-14 RX ADMIN — CHOLESTYRAMINE 4 G: 4 POWDER, FOR SUSPENSION ORAL at 08:14

## 2024-10-14 RX ADMIN — INSULIN GLARGINE 18 UNITS: 100 INJECTION, SOLUTION SUBCUTANEOUS at 08:14

## 2024-10-14 RX ADMIN — TAMSULOSIN HYDROCHLORIDE 0.4 MG: 0.4 CAPSULE ORAL at 08:12

## 2024-10-14 RX ADMIN — OXYBUTYNIN CHLORIDE 5 MG: 5 TABLET, EXTENDED RELEASE ORAL at 21:15

## 2024-10-14 RX ADMIN — FERROUS SULFATE TAB EC 325 MG (65 MG FE EQUIVALENT) 325 MG: 325 (65 FE) TABLET DELAYED RESPONSE at 08:15

## 2024-10-14 RX ADMIN — WARFARIN SODIUM 10 MG: 10 TABLET ORAL at 18:28

## 2024-10-14 RX ADMIN — VANCOMYCIN HYDROCHLORIDE 125 MG: 125 CAPSULE ORAL at 16:28

## 2024-10-14 RX ADMIN — INSULIN LISPRO 1 UNITS: 100 INJECTION, SOLUTION INTRAVENOUS; SUBCUTANEOUS at 16:27

## 2024-10-14 RX ADMIN — SODIUM BICARBONATE 1300 MG: 650 TABLET ORAL at 08:13

## 2024-10-14 RX ADMIN — CLOTRIMAZOLE AND BETAMETHASONE DIPROPIONATE: 10; .5 CREAM TOPICAL at 21:18

## 2024-10-14 RX ADMIN — SODIUM BICARBONATE 1300 MG: 650 TABLET ORAL at 21:14

## 2024-10-14 RX ADMIN — OXYCODONE AND ACETAMINOPHEN 1 TABLET: 5; 325 TABLET ORAL at 08:13

## 2024-10-14 ASSESSMENT — PAIN DESCRIPTION - ONSET: ONSET: ON-GOING

## 2024-10-14 ASSESSMENT — PAIN - FUNCTIONAL ASSESSMENT: PAIN_FUNCTIONAL_ASSESSMENT: PREVENTS OR INTERFERES SOME ACTIVE ACTIVITIES AND ADLS

## 2024-10-14 ASSESSMENT — PAIN SCALES - GENERAL
PAINLEVEL_OUTOF10: 6
PAINLEVEL_OUTOF10: 8

## 2024-10-14 ASSESSMENT — PAIN DESCRIPTION - DESCRIPTORS: DESCRIPTORS: ACHING

## 2024-10-14 ASSESSMENT — PAIN DESCRIPTION - LOCATION: LOCATION: GENERALIZED

## 2024-10-14 ASSESSMENT — PAIN DESCRIPTION - FREQUENCY: FREQUENCY: CONTINUOUS

## 2024-10-14 ASSESSMENT — PAIN DESCRIPTION - PAIN TYPE: TYPE: CHRONIC PAIN

## 2024-10-15 PROBLEM — E43 SEVERE MALNUTRITION (HCC): Status: ACTIVE | Noted: 2024-10-15

## 2024-10-15 LAB
ANION GAP SERPL CALCULATED.3IONS-SCNC: 11 MMOL/L (ref 9–17)
BUN SERPL-MCNC: 26 MG/DL (ref 8–23)
BUN/CREAT SERPL: 20 (ref 9–20)
CALCIUM SERPL-MCNC: 8.8 MG/DL (ref 8.6–10.4)
CHLORIDE SERPL-SCNC: 105 MMOL/L (ref 98–107)
CO2 SERPL-SCNC: 25 MMOL/L (ref 20–31)
CREAT SERPL-MCNC: 1.3 MG/DL (ref 0.7–1.2)
ERYTHROCYTE [DISTWIDTH] IN BLOOD BY AUTOMATED COUNT: 14.4 % (ref 11.8–14.4)
GFR, ESTIMATED: 60 ML/MIN/1.73M2
GLUCOSE BLD-MCNC: 109 MG/DL (ref 75–110)
GLUCOSE BLD-MCNC: 127 MG/DL (ref 75–110)
GLUCOSE BLD-MCNC: 231 MG/DL (ref 75–110)
GLUCOSE BLD-MCNC: 319 MG/DL (ref 75–110)
GLUCOSE SERPL-MCNC: 132 MG/DL (ref 70–99)
HCT VFR BLD AUTO: 30.6 % (ref 40.7–50.3)
HGB BLD-MCNC: 9.1 G/DL (ref 13–17)
INR PPP: 1.8
MCH RBC QN AUTO: 27.2 PG (ref 25.2–33.5)
MCHC RBC AUTO-ENTMCNC: 29.7 G/DL (ref 28.4–34.8)
MCV RBC AUTO: 91.6 FL (ref 82.6–102.9)
NRBC BLD-RTO: 0 PER 100 WBC
PLATELET # BLD AUTO: 639 K/UL (ref 138–453)
PMV BLD AUTO: 9.4 FL (ref 8.1–13.5)
POTASSIUM SERPL-SCNC: 5.1 MMOL/L (ref 3.7–5.3)
PROTHROMBIN TIME: 21 SEC (ref 11.5–14.2)
RBC # BLD AUTO: 3.34 M/UL (ref 4.21–5.77)
SODIUM SERPL-SCNC: 141 MMOL/L (ref 135–144)
WBC OTHER # BLD: 15.5 K/UL (ref 3.5–11.3)

## 2024-10-15 PROCEDURE — 2580000003 HC RX 258: Performed by: UROLOGY

## 2024-10-15 PROCEDURE — 80048 BASIC METABOLIC PNL TOTAL CA: CPT

## 2024-10-15 PROCEDURE — 6360000002 HC RX W HCPCS: Performed by: UROLOGY

## 2024-10-15 PROCEDURE — 6370000000 HC RX 637 (ALT 250 FOR IP): Performed by: UROLOGY

## 2024-10-15 PROCEDURE — 82947 ASSAY GLUCOSE BLOOD QUANT: CPT

## 2024-10-15 PROCEDURE — 85027 COMPLETE CBC AUTOMATED: CPT

## 2024-10-15 PROCEDURE — 36415 COLL VENOUS BLD VENIPUNCTURE: CPT

## 2024-10-15 PROCEDURE — 97166 OT EVAL MOD COMPLEX 45 MIN: CPT

## 2024-10-15 PROCEDURE — 6370000000 HC RX 637 (ALT 250 FOR IP): Performed by: NURSE PRACTITIONER

## 2024-10-15 PROCEDURE — 99232 SBSQ HOSP IP/OBS MODERATE 35: CPT | Performed by: NURSE PRACTITIONER

## 2024-10-15 PROCEDURE — 1200000000 HC SEMI PRIVATE

## 2024-10-15 PROCEDURE — 85610 PROTHROMBIN TIME: CPT

## 2024-10-15 PROCEDURE — 97162 PT EVAL MOD COMPLEX 30 MIN: CPT

## 2024-10-15 PROCEDURE — 6370000000 HC RX 637 (ALT 250 FOR IP): Performed by: FAMILY MEDICINE

## 2024-10-15 PROCEDURE — 6360000002 HC RX W HCPCS: Performed by: NURSE PRACTITIONER

## 2024-10-15 PROCEDURE — 6360000002 HC RX W HCPCS

## 2024-10-15 RX ORDER — HALOPERIDOL 5 MG/ML
2 INJECTION INTRAMUSCULAR EVERY 6 HOURS PRN
Status: DISCONTINUED | OUTPATIENT
Start: 2024-10-15 | End: 2024-10-21 | Stop reason: HOSPADM

## 2024-10-15 RX ORDER — DIPHENHYDRAMINE HYDROCHLORIDE 50 MG/ML
50 INJECTION INTRAMUSCULAR; INTRAVENOUS EVERY 6 HOURS PRN
Status: DISCONTINUED | OUTPATIENT
Start: 2024-10-15 | End: 2024-10-21 | Stop reason: HOSPADM

## 2024-10-15 RX ORDER — MORPHINE SULFATE 2 MG/ML
2 INJECTION, SOLUTION INTRAMUSCULAR; INTRAVENOUS ONCE
Status: DISCONTINUED | OUTPATIENT
Start: 2024-10-15 | End: 2024-10-16

## 2024-10-15 RX ORDER — WARFARIN SODIUM 2.5 MG/1
2.5 TABLET ORAL
Status: COMPLETED | OUTPATIENT
Start: 2024-10-15 | End: 2024-10-15

## 2024-10-15 RX ORDER — FENTANYL CITRATE 0.05 MG/ML
50 INJECTION, SOLUTION INTRAMUSCULAR; INTRAVENOUS ONCE
Status: COMPLETED | OUTPATIENT
Start: 2024-10-15 | End: 2024-10-15

## 2024-10-15 RX ADMIN — BUSPIRONE HYDROCHLORIDE 5 MG: 5 TABLET ORAL at 08:52

## 2024-10-15 RX ADMIN — OXYBUTYNIN CHLORIDE 5 MG: 5 TABLET, EXTENDED RELEASE ORAL at 21:26

## 2024-10-15 RX ADMIN — FAMOTIDINE 20 MG: 20 TABLET, FILM COATED ORAL at 08:52

## 2024-10-15 RX ADMIN — VANCOMYCIN HYDROCHLORIDE 125 MG: 125 CAPSULE ORAL at 08:51

## 2024-10-15 RX ADMIN — PANCRELIPASE LIPASE, PANCRELIPASE PROTEASE, PANCRELIPASE AMYLASE 5000 UNITS: 5000; 17000; 24000 CAPSULE, DELAYED RELEASE ORAL at 08:52

## 2024-10-15 RX ADMIN — INSULIN LISPRO 1 UNITS: 100 INJECTION, SOLUTION INTRAVENOUS; SUBCUTANEOUS at 11:53

## 2024-10-15 RX ADMIN — INSULIN GLARGINE 20 UNITS: 100 INJECTION, SOLUTION SUBCUTANEOUS at 08:52

## 2024-10-15 RX ADMIN — BUSPIRONE HYDROCHLORIDE 5 MG: 5 TABLET ORAL at 21:26

## 2024-10-15 RX ADMIN — QUETIAPINE FUMARATE 50 MG: 25 TABLET ORAL at 21:26

## 2024-10-15 RX ADMIN — ANTI-FUNGAL POWDER MICONAZOLE NITRATE TALC FREE: 1.42 POWDER TOPICAL at 08:53

## 2024-10-15 RX ADMIN — OXYCODONE AND ACETAMINOPHEN 1 TABLET: 5; 325 TABLET ORAL at 19:56

## 2024-10-15 RX ADMIN — CLOTRIMAZOLE AND BETAMETHASONE DIPROPIONATE: 10; .5 CREAM TOPICAL at 08:55

## 2024-10-15 RX ADMIN — PANCRELIPASE LIPASE, PANCRELIPASE PROTEASE, PANCRELIPASE AMYLASE 5000 UNITS: 5000; 17000; 24000 CAPSULE, DELAYED RELEASE ORAL at 18:00

## 2024-10-15 RX ADMIN — SODIUM BICARBONATE 1300 MG: 650 TABLET ORAL at 08:52

## 2024-10-15 RX ADMIN — PANCRELIPASE LIPASE, PANCRELIPASE PROTEASE, PANCRELIPASE AMYLASE 5000 UNITS: 5000; 17000; 24000 CAPSULE, DELAYED RELEASE ORAL at 11:53

## 2024-10-15 RX ADMIN — METOPROLOL TARTRATE 25 MG: 25 TABLET, FILM COATED ORAL at 08:52

## 2024-10-15 RX ADMIN — CHOLESTYRAMINE 4 G: 4 POWDER, FOR SUSPENSION ORAL at 08:51

## 2024-10-15 RX ADMIN — TRAZODONE HYDROCHLORIDE 50 MG: 50 TABLET ORAL at 21:26

## 2024-10-15 RX ADMIN — PANCRELIPASE LIPASE, PANCRELIPASE PROTEASE, PANCRELIPASE AMYLASE 20000 UNITS: 20000; 63000; 84000 CAPSULE, DELAYED RELEASE ORAL at 08:52

## 2024-10-15 RX ADMIN — CLOTRIMAZOLE AND BETAMETHASONE DIPROPIONATE: 10; .5 CREAM TOPICAL at 21:26

## 2024-10-15 RX ADMIN — ANTI-FUNGAL POWDER MICONAZOLE NITRATE TALC FREE: 1.42 POWDER TOPICAL at 21:27

## 2024-10-15 RX ADMIN — SODIUM CHLORIDE, PRESERVATIVE FREE 10 ML: 5 INJECTION INTRAVENOUS at 08:53

## 2024-10-15 RX ADMIN — INSULIN LISPRO 3 UNITS: 100 INJECTION, SOLUTION INTRAVENOUS; SUBCUTANEOUS at 21:27

## 2024-10-15 RX ADMIN — WATER 1000 MG: 1 INJECTION INTRAMUSCULAR; INTRAVENOUS; SUBCUTANEOUS at 17:59

## 2024-10-15 RX ADMIN — DIPHENHYDRAMINE HYDROCHLORIDE 50 MG: 50 INJECTION INTRAMUSCULAR; INTRAVENOUS at 20:16

## 2024-10-15 RX ADMIN — AZITHROMYCIN DIHYDRATE 250 MG: 250 TABLET ORAL at 08:52

## 2024-10-15 RX ADMIN — PANCRELIPASE LIPASE, PANCRELIPASE PROTEASE, PANCRELIPASE AMYLASE 20000 UNITS: 20000; 63000; 84000 CAPSULE, DELAYED RELEASE ORAL at 11:53

## 2024-10-15 RX ADMIN — DIPHENHYDRAMINE HYDROCHLORIDE 50 MG: 50 INJECTION INTRAMUSCULAR; INTRAVENOUS at 11:54

## 2024-10-15 RX ADMIN — SODIUM BICARBONATE 1300 MG: 650 TABLET ORAL at 21:26

## 2024-10-15 RX ADMIN — GUAIFENESIN 600 MG: 600 TABLET ORAL at 08:52

## 2024-10-15 RX ADMIN — ENOXAPARIN SODIUM 40 MG: 100 INJECTION SUBCUTANEOUS at 08:54

## 2024-10-15 RX ADMIN — WARFARIN SODIUM 2.5 MG: 2.5 TABLET ORAL at 18:00

## 2024-10-15 RX ADMIN — METOPROLOL TARTRATE 25 MG: 25 TABLET, FILM COATED ORAL at 21:26

## 2024-10-15 RX ADMIN — HALOPERIDOL LACTATE 2 MG: 5 INJECTION, SOLUTION INTRAMUSCULAR at 21:27

## 2024-10-15 RX ADMIN — TAMSULOSIN HYDROCHLORIDE 0.4 MG: 0.4 CAPSULE ORAL at 08:52

## 2024-10-15 RX ADMIN — PANCRELIPASE LIPASE, PANCRELIPASE PROTEASE, PANCRELIPASE AMYLASE 20000 UNITS: 20000; 63000; 84000 CAPSULE, DELAYED RELEASE ORAL at 18:00

## 2024-10-15 RX ADMIN — VANCOMYCIN HYDROCHLORIDE 125 MG: 125 CAPSULE ORAL at 18:00

## 2024-10-15 RX ADMIN — GUAIFENESIN 600 MG: 600 TABLET ORAL at 21:26

## 2024-10-15 RX ADMIN — OXYCODONE AND ACETAMINOPHEN 1 TABLET: 5; 325 TABLET ORAL at 08:52

## 2024-10-15 RX ADMIN — TAMSULOSIN HYDROCHLORIDE 0.4 MG: 0.4 CAPSULE ORAL at 21:26

## 2024-10-15 RX ADMIN — SODIUM CHLORIDE, PRESERVATIVE FREE 10 ML: 5 INJECTION INTRAVENOUS at 20:16

## 2024-10-15 RX ADMIN — QUETIAPINE FUMARATE 50 MG: 25 TABLET ORAL at 08:52

## 2024-10-15 RX ADMIN — FENTANYL CITRATE 50 MCG: 0.05 INJECTION, SOLUTION INTRAMUSCULAR; INTRAVENOUS at 21:27

## 2024-10-15 ASSESSMENT — PAIN DESCRIPTION - ONSET
ONSET: ON-GOING
ONSET: ON-GOING

## 2024-10-15 ASSESSMENT — PAIN DESCRIPTION - LOCATION
LOCATION: SCROTUM
LOCATION: ABDOMEN;LEG
LOCATION: SCROTUM
LOCATION: ABDOMEN;LEG

## 2024-10-15 ASSESSMENT — PAIN SCALES - GENERAL
PAINLEVEL_OUTOF10: 10
PAINLEVEL_OUTOF10: 4
PAINLEVEL_OUTOF10: 8
PAINLEVEL_OUTOF10: 0
PAINLEVEL_OUTOF10: 9

## 2024-10-15 ASSESSMENT — PAIN DESCRIPTION - FREQUENCY
FREQUENCY: CONTINUOUS
FREQUENCY: CONTINUOUS

## 2024-10-15 ASSESSMENT — PAIN DESCRIPTION - ORIENTATION
ORIENTATION: RIGHT;LEFT;MID
ORIENTATION: RIGHT;LEFT;MID
ORIENTATION: RIGHT;LEFT
ORIENTATION: RIGHT;LEFT

## 2024-10-15 ASSESSMENT — PAIN DESCRIPTION - PAIN TYPE
TYPE: CHRONIC PAIN
TYPE: CHRONIC PAIN

## 2024-10-15 ASSESSMENT — PAIN DESCRIPTION - DESCRIPTORS
DESCRIPTORS: SHARP
DESCRIPTORS: SHARP

## 2024-10-15 NOTE — CARE COORDINATION
DC Planning     Rec phone call from pt dtr Harish-she is on emergency contact list. She lives in [place] and trying to help arrange an apt for her dad in [place]. She [facility] off income but needs someone to help  pt fill out paperwork. Her back up plan is [employer] apartments in [place]. Both are in close vicinity to her and her sister. Harish explains she does not speak w her sister Marisela. Marisela is HCPOA although pt is a+o, and his own decision maker.     Harish lives w pt mom in [place] they have openings at [facility] beginning Nov 1st. Harish admits she is not POA but would still like to get pt closer to family in [place].     Harish relays the last time she saw her dad was in June/July and relays her sister Marisela also saw him around the same time. Advised pt appears much weaker.  She relays pt cannot move in with her and his mom but can help arranging care elsewhere in [place].     Will update LSW.

## 2024-10-15 NOTE — CARE COORDINATION
DC Planning    Spoke with pt, discussed Jimena Encarnacion-charles w referral. Does not remember the last time he was home. Does not want to go back to [facility]. Pt appears much more feeble than previous admissions.  Given multiple co morbidities may warrant palliative care consult. PS to attending.

## 2024-10-15 NOTE — CARE COORDINATION
Social work; received call from Mom who is on the contact list and she was with pt's daughter Harish. Both advise that other daughter Marisela keeps decisions out of their hands. Harish and mother stated \"my son cannot stay with me as he is too nasty to everyone and I am in my 80;s and cannot do it\". Howevr daughter wants to consider a senior apt there in [place] where his family all live, Reminded them both pt must want to do that. he is currently of sound mind. They should discuss with him . With his medical needs and non compliant history not sure he would manage his own care without support. He also has a dog that his friend Lorelei was caring for while pt was here. Has another friend ady who came in one day to see pt..  No family out side of [place]. Pt is out of network for many snf facilities. He has left on bad terms at most he has been to including [facility]. Where he just left pt states, \"he will not go back there ever\". Not sure there is anywhere pt can go. His insurance is restrictied to in network snf's and he is not welcome to return to most. Asked pt if he planned to returned home at discharge. He would not give any plan nor would cocoperate wiith discussion. He states my daughter marisela will decide. But pt is of sound mind and what he wants to do will be what heappens..  Will continue to follow. [facility]

## 2024-10-15 NOTE — ACP (ADVANCE CARE PLANNING)
Advance Care Planning     Advance Care Planning Activator (Inpatient)  Conversation Note      Date of ACP Conversation: 10/15/2024     Conversation Conducted with: Patient with Decision Making Capacity    ACP Activator: Elke Noriega RN        Health Care Decision Maker: self     Current Designated Health Care Decision Maker:     Primary Decision Maker: Marisela Ramirez - Child - [phone]  Click here to complete Healthcare Decision Makers including section of the Healthcare Decision Maker Relationship (ie \"Primary\")  Today we documented Decision Maker(s) consistent with ACP documents on file.    Care Preferences    Ventilation:  \"If you were in your present state of health and suddenly became very ill and were unable to breathe on your own, what would your preference be about the use of a ventilator (breathing machine) if it were available to you?\"      Would the patient desire the use of ventilator (breathing machine)?: yes    Spoke with pt confirms full code when discussed pt relays dtr Marisela is HCPOA-limited in conversation.      [] Yes   [] No   Educated Patient / Decision Maker regarding differences between Advance Directives and portable DNR orders.    Length of ACP Conversation in minutes:  10    Conversation Outcomes:  ACP completed-will ask for palliative consult for goals of care.        Follow-up plan:    [] Schedule follow-up conversation to continue planning  [] Referred individual to Provider for additional questions/concerns   [] Advised patient/agent/surrogate to review completed ACP document and update if needed with changes in condition, patient preferences or care setting    [] This note routed to one or more involved healthcare providers

## 2024-10-16 PROBLEM — E87.5 HYPERKALEMIA: Status: RESOLVED | Noted: 2021-11-26 | Resolved: 2024-10-16

## 2024-10-16 PROBLEM — N17.9 ACUTE KIDNEY INJURY SUPERIMPOSED ON CKD (HCC): Status: RESOLVED | Noted: 2018-07-10 | Resolved: 2024-10-16

## 2024-10-16 PROBLEM — J96.01 ACUTE HYPOXEMIC RESPIRATORY FAILURE: Status: RESOLVED | Noted: 2024-10-11 | Resolved: 2024-10-16

## 2024-10-16 PROBLEM — N18.9 ACUTE KIDNEY INJURY SUPERIMPOSED ON CKD (HCC): Status: RESOLVED | Noted: 2018-07-10 | Resolved: 2024-10-16

## 2024-10-16 LAB
GLUCOSE BLD-MCNC: 126 MG/DL (ref 75–110)
GLUCOSE BLD-MCNC: 196 MG/DL (ref 75–110)
GLUCOSE BLD-MCNC: 307 MG/DL (ref 75–110)
GLUCOSE BLD-MCNC: 318 MG/DL (ref 75–110)
INR PPP: 2.2
MICROORGANISM SPEC CULT: ABNORMAL
MICROORGANISM SPEC CULT: ABNORMAL
PROTHROMBIN TIME: 24.1 SEC (ref 11.5–14.2)
SPECIMEN DESCRIPTION: ABNORMAL

## 2024-10-16 PROCEDURE — 99232 SBSQ HOSP IP/OBS MODERATE 35: CPT | Performed by: NURSE PRACTITIONER

## 2024-10-16 PROCEDURE — 6370000000 HC RX 637 (ALT 250 FOR IP): Performed by: UROLOGY

## 2024-10-16 PROCEDURE — 36415 COLL VENOUS BLD VENIPUNCTURE: CPT

## 2024-10-16 PROCEDURE — 82947 ASSAY GLUCOSE BLOOD QUANT: CPT

## 2024-10-16 PROCEDURE — 6370000000 HC RX 637 (ALT 250 FOR IP): Performed by: NURSE PRACTITIONER

## 2024-10-16 PROCEDURE — 1200000000 HC SEMI PRIVATE

## 2024-10-16 PROCEDURE — 6370000000 HC RX 637 (ALT 250 FOR IP): Performed by: INTERNAL MEDICINE

## 2024-10-16 PROCEDURE — 6370000000 HC RX 637 (ALT 250 FOR IP)

## 2024-10-16 PROCEDURE — 6360000002 HC RX W HCPCS

## 2024-10-16 PROCEDURE — 97530 THERAPEUTIC ACTIVITIES: CPT

## 2024-10-16 PROCEDURE — 85610 PROTHROMBIN TIME: CPT

## 2024-10-16 PROCEDURE — 97535 SELF CARE MNGMENT TRAINING: CPT

## 2024-10-16 PROCEDURE — 2580000003 HC RX 258: Performed by: UROLOGY

## 2024-10-16 PROCEDURE — 6360000002 HC RX W HCPCS: Performed by: NURSE PRACTITIONER

## 2024-10-16 RX ORDER — QUETIAPINE FUMARATE 50 MG/1
50 TABLET, FILM COATED ORAL 2 TIMES DAILY
DISCHARGE
Start: 2024-10-16

## 2024-10-16 RX ORDER — NICOTINE 21 MG/24HR
1 PATCH, TRANSDERMAL 24 HOURS TRANSDERMAL DAILY
DISCHARGE
Start: 2024-10-17

## 2024-10-16 RX ORDER — LACTOBACILLUS RHAMNOSUS GG 10B CELL
1 CAPSULE ORAL
Status: DISCONTINUED | OUTPATIENT
Start: 2024-10-16 | End: 2024-10-21 | Stop reason: HOSPADM

## 2024-10-16 RX ORDER — MORPHINE SULFATE 2 MG/ML
2 INJECTION, SOLUTION INTRAMUSCULAR; INTRAVENOUS EVERY 4 HOURS PRN
Status: DISCONTINUED | OUTPATIENT
Start: 2024-10-16 | End: 2024-10-16

## 2024-10-16 RX ORDER — WARFARIN SODIUM 2.5 MG/1
2.5 TABLET ORAL
Status: COMPLETED | OUTPATIENT
Start: 2024-10-16 | End: 2024-10-16

## 2024-10-16 RX ORDER — FAMOTIDINE 20 MG/1
20 TABLET, FILM COATED ORAL DAILY
DISCHARGE
Start: 2024-10-17

## 2024-10-16 RX ORDER — MORPHINE SULFATE 4 MG/ML
4 INJECTION, SOLUTION INTRAMUSCULAR; INTRAVENOUS EVERY 4 HOURS PRN
Status: DISCONTINUED | OUTPATIENT
Start: 2024-10-16 | End: 2024-10-16

## 2024-10-16 RX ORDER — OXYCODONE AND ACETAMINOPHEN 5; 325 MG/1; MG/1
1 TABLET ORAL EVERY 6 HOURS PRN
Qty: 12 TABLET | Refills: 0 | Status: SHIPPED | OUTPATIENT
Start: 2024-10-16 | End: 2024-10-19

## 2024-10-16 RX ORDER — CLOTRIMAZOLE AND BETAMETHASONE DIPROPIONATE 10; .64 MG/G; MG/G
CREAM TOPICAL
DISCHARGE
Start: 2024-10-16

## 2024-10-16 RX ADMIN — OXYCODONE AND ACETAMINOPHEN 1 TABLET: 5; 325 TABLET ORAL at 15:49

## 2024-10-16 RX ADMIN — DIPHENHYDRAMINE HYDROCHLORIDE 50 MG: 50 INJECTION INTRAMUSCULAR; INTRAVENOUS at 22:12

## 2024-10-16 RX ADMIN — OXYCODONE AND ACETAMINOPHEN 1 TABLET: 5; 325 TABLET ORAL at 06:37

## 2024-10-16 RX ADMIN — SODIUM CHLORIDE, PRESERVATIVE FREE 10 ML: 5 INJECTION INTRAVENOUS at 22:15

## 2024-10-16 RX ADMIN — SODIUM BICARBONATE 1300 MG: 650 TABLET ORAL at 22:12

## 2024-10-16 RX ADMIN — SODIUM BICARBONATE 1300 MG: 650 TABLET ORAL at 08:11

## 2024-10-16 RX ADMIN — PANCRELIPASE LIPASE, PANCRELIPASE PROTEASE, PANCRELIPASE AMYLASE 20000 UNITS: 20000; 63000; 84000 CAPSULE, DELAYED RELEASE ORAL at 18:37

## 2024-10-16 RX ADMIN — TAMSULOSIN HYDROCHLORIDE 0.4 MG: 0.4 CAPSULE ORAL at 22:13

## 2024-10-16 RX ADMIN — FAMOTIDINE 20 MG: 20 TABLET, FILM COATED ORAL at 07:57

## 2024-10-16 RX ADMIN — GUAIFENESIN 600 MG: 600 TABLET ORAL at 22:12

## 2024-10-16 RX ADMIN — PANCRELIPASE LIPASE, PANCRELIPASE PROTEASE, PANCRELIPASE AMYLASE 5000 UNITS: 5000; 17000; 24000 CAPSULE, DELAYED RELEASE ORAL at 12:22

## 2024-10-16 RX ADMIN — SODIUM CHLORIDE, PRESERVATIVE FREE 10 ML: 5 INJECTION INTRAVENOUS at 08:04

## 2024-10-16 RX ADMIN — CLOTRIMAZOLE AND BETAMETHASONE DIPROPIONATE: 10; .5 CREAM TOPICAL at 08:05

## 2024-10-16 RX ADMIN — TRAZODONE HYDROCHLORIDE 50 MG: 50 TABLET ORAL at 22:21

## 2024-10-16 RX ADMIN — BUSPIRONE HYDROCHLORIDE 5 MG: 5 TABLET ORAL at 22:12

## 2024-10-16 RX ADMIN — INSULIN GLARGINE 20 UNITS: 100 INJECTION, SOLUTION SUBCUTANEOUS at 08:11

## 2024-10-16 RX ADMIN — TAMSULOSIN HYDROCHLORIDE 0.4 MG: 0.4 CAPSULE ORAL at 07:56

## 2024-10-16 RX ADMIN — INSULIN LISPRO 3 UNITS: 100 INJECTION, SOLUTION INTRAVENOUS; SUBCUTANEOUS at 12:22

## 2024-10-16 RX ADMIN — HALOPERIDOL LACTATE 2 MG: 5 INJECTION, SOLUTION INTRAMUSCULAR at 22:11

## 2024-10-16 RX ADMIN — PANCRELIPASE LIPASE, PANCRELIPASE PROTEASE, PANCRELIPASE AMYLASE 5000 UNITS: 5000; 17000; 24000 CAPSULE, DELAYED RELEASE ORAL at 07:56

## 2024-10-16 RX ADMIN — HYDROMORPHONE HYDROCHLORIDE 0.5 MG: 1 INJECTION, SOLUTION INTRAMUSCULAR; INTRAVENOUS; SUBCUTANEOUS at 18:38

## 2024-10-16 RX ADMIN — PANCRELIPASE LIPASE, PANCRELIPASE PROTEASE, PANCRELIPASE AMYLASE 20000 UNITS: 20000; 63000; 84000 CAPSULE, DELAYED RELEASE ORAL at 07:57

## 2024-10-16 RX ADMIN — ANTI-FUNGAL POWDER MICONAZOLE NITRATE TALC FREE: 1.42 POWDER TOPICAL at 08:04

## 2024-10-16 RX ADMIN — PANCRELIPASE LIPASE, PANCRELIPASE PROTEASE, PANCRELIPASE AMYLASE 5000 UNITS: 5000; 17000; 24000 CAPSULE, DELAYED RELEASE ORAL at 18:37

## 2024-10-16 RX ADMIN — PANCRELIPASE LIPASE, PANCRELIPASE PROTEASE, PANCRELIPASE AMYLASE 20000 UNITS: 20000; 63000; 84000 CAPSULE, DELAYED RELEASE ORAL at 12:22

## 2024-10-16 RX ADMIN — METOPROLOL TARTRATE 25 MG: 25 TABLET, FILM COATED ORAL at 22:12

## 2024-10-16 RX ADMIN — METOPROLOL TARTRATE 25 MG: 25 TABLET, FILM COATED ORAL at 07:56

## 2024-10-16 RX ADMIN — INSULIN LISPRO 3 UNITS: 100 INJECTION, SOLUTION INTRAVENOUS; SUBCUTANEOUS at 22:11

## 2024-10-16 RX ADMIN — GUAIFENESIN 600 MG: 600 TABLET ORAL at 07:57

## 2024-10-16 RX ADMIN — WARFARIN SODIUM 2.5 MG: 2.5 TABLET ORAL at 18:37

## 2024-10-16 RX ADMIN — VANCOMYCIN HYDROCHLORIDE 125 MG: 125 CAPSULE ORAL at 22:21

## 2024-10-16 RX ADMIN — CHOLESTYRAMINE 4 G: 4 POWDER, FOR SUSPENSION ORAL at 07:57

## 2024-10-16 RX ADMIN — OXYBUTYNIN CHLORIDE 5 MG: 5 TABLET, EXTENDED RELEASE ORAL at 22:12

## 2024-10-16 RX ADMIN — SIMETHICONE 160 MG: 80 TABLET, CHEWABLE ORAL at 06:38

## 2024-10-16 RX ADMIN — BUSPIRONE HYDROCHLORIDE 5 MG: 5 TABLET ORAL at 07:56

## 2024-10-16 RX ADMIN — QUETIAPINE FUMARATE 50 MG: 25 TABLET ORAL at 07:56

## 2024-10-16 RX ADMIN — QUETIAPINE FUMARATE 50 MG: 25 TABLET ORAL at 22:12

## 2024-10-16 RX ADMIN — Medication 1 CAPSULE: at 18:37

## 2024-10-16 ASSESSMENT — PAIN SCALES - GENERAL
PAINLEVEL_OUTOF10: 8
PAINLEVEL_OUTOF10: 10
PAINLEVEL_OUTOF10: 6
PAINLEVEL_OUTOF10: 8
PAINLEVEL_OUTOF10: 9
PAINLEVEL_OUTOF10: 8

## 2024-10-16 ASSESSMENT — PAIN DESCRIPTION - FREQUENCY
FREQUENCY: CONTINUOUS

## 2024-10-16 ASSESSMENT — PAIN DESCRIPTION - LOCATION
LOCATION: SACRUM;SCROTUM
LOCATION: SCROTUM;SACRUM
LOCATION: ABDOMEN;LEG

## 2024-10-16 ASSESSMENT — PAIN DESCRIPTION - PAIN TYPE
TYPE: CHRONIC PAIN;ACUTE PAIN
TYPE: CHRONIC PAIN;ACUTE PAIN
TYPE: CHRONIC PAIN

## 2024-10-16 ASSESSMENT — PAIN DESCRIPTION - ONSET
ONSET: ON-GOING

## 2024-10-16 ASSESSMENT — PAIN DESCRIPTION - DESCRIPTORS
DESCRIPTORS: SHARP;STABBING
DESCRIPTORS: ACHING
DESCRIPTORS: ACHING;DISCOMFORT

## 2024-10-16 ASSESSMENT — PAIN - FUNCTIONAL ASSESSMENT
PAIN_FUNCTIONAL_ASSESSMENT: PREVENTS OR INTERFERES SOME ACTIVE ACTIVITIES AND ADLS
PAIN_FUNCTIONAL_ASSESSMENT: PREVENTS OR INTERFERES WITH ALL ACTIVE AND SOME PASSIVE ACTIVITIES
PAIN_FUNCTIONAL_ASSESSMENT: PREVENTS OR INTERFERES WITH ALL ACTIVE AND SOME PASSIVE ACTIVITIES

## 2024-10-16 ASSESSMENT — PAIN DESCRIPTION - ORIENTATION
ORIENTATION: LOWER
ORIENTATION: RIGHT;MID

## 2024-10-16 NOTE — CARE COORDINATION
Was paged by nursing regarding patient's request for pain medications.  Patient stating that IV morphine and oral pain medications are not working.  He was requesting IV Dilaudid only.  As patient is getting ready to be discharged, oral pain medications are the best meds for him.  Will continue with oral pain meds at this time.  Will order 1 dose of IV Dilaudid per patient request.  Will discontinue morphine as patient states this does not work.  No further IV pain meds will be prescribed at this time.    MARCELO Esteban

## 2024-10-16 NOTE — CARE COORDINATION
KAREN Planning    Spoke with pt at bedside, he is a+o, cooperative, and his own decision maker. Discussed his thoughts on going home -pt would like to try to get into another rehab center as he is weak and needs to get stronger and practice getting in/out of WC and transfers.     He does not want to go to [facility] but willing to try other places in network w insurance even if it is a farther distance.     Discussed hhc-if he cannot get approved for another rehab then he will go home w HHC. He lives in a ran home-level entry. His main support person that had been helping him is injured her leg and in a brace and cannot help the way she was in the past. His other neighbor Clinton checks on him although pt relays he has some dementia. He does have Meals on Wheels. He has a WC at home and a scooter.     Called [facility] to check on services for HHC however closed at 5:30.       Advised pt to work w PT/OT tomorrow and pt agrees.     SHANNA HARRISON.

## 2024-10-16 NOTE — CARE COORDINATION
Social Work-Spoke with daughter, Marisela. She states that she feels that patient will receive better care at home than at a SNF. She states that she spoke with [facility] and they can provide 8 hours of care a day. Explained that insurance may not approve 8 hours a day. She states that to fin a home care provider, [place] can locate providers. Their number is [phone] or [phone]. DIscussed with . Beto

## 2024-10-16 NOTE — CARE COORDINATION
DC Planning    Spoke with primary nurse, pt rl dailey. Urology does not plan to pull stent dt hematuria-will re-evaluate tomorrow. Lovenox is on hold. Discussed w LSW-pt dtr prefers pt go home however he will not have 24 hr care.

## 2024-10-16 NOTE — DISCHARGE INSTR - COC
Continuity of Care Form    Patient Name: Nicolas Cardenas   :  1955  MRN:  [id number]    Admit date:  10/11/2024  Discharge date:  10/21/24    Code Status Order: Full Code   Advance Directives:   Advance Care Flowsheet Documentation             Admitting Physician:  Haleigh Sorto MD  PCP: Rosa Pond, APRN - NP    Discharging Nurse: Deanna SOW   Discharging Hospital Unit/Room#:   Discharging Unit Phone Number: [phone]    Emergency Contact:   Extended Emergency Contact Information  Primary Emergency Contact: Marisela Ramirez  Home Phone: [phone]  Relation: Child   needed? No  Secondary Emergency Contact: Lorelei Alberto  Home Phone: [phone]  Relation: Friend    Past Surgical History:  Past Surgical History:   Procedure Laterality Date    BLADDER SURGERY Bilateral 2022    CYSTOSCOPY BILATERALRETROGRADE PYELOGRAM  BILATERAL STENT INSERTION performed by Nicolas Gao MD at [facility] OR    COLONOSCOPY  2015    hyperplastic polyp    COLONOSCOPY  2017    CYSTOSCOPY N/A 2024    CYSTOSCOPY , DIALATION , CYSTOLITHOLAPEXY performed by Nicolas Gao MD at [facility] OR    CYSTOSCOPY N/A 2024    CYSTOSCOPY URETHRAL DILATATION performed by Nicolas Gao MD at [facility] OR    CYSTOSCOPY N/A 10/13/2024    CYSTOSCOPY URETERAL STENT EXCHANGE RIGHT STRING ON,CYSTOLITHOLOPEXY performed by Nicolas Gao MD at [facility] OR    ESOPHAGECTOMY      cancer    FOOT DEBRIDEMENT Left 2021    I&D LEFT FOOT WITH REMOVAL OF NONVIABLE BONE AND SOFT TISSUE performed by Rosa Merino DPM at [facility] OR    FOOT DEBRIDEMENT Left 2021    LEFT FOOT DEBRIDEMENT WITH REMOVAL ALL NON VIABLE SOFT TISSUE AND BONE performed by Rosa Merino DPM at [facility] OR    FOOT SURGERY Right 2016    I & D heel    FOOT SURGERY Right 2016    I & D    FRACTURE SURGERY Left 2015    humerus left, left leg    HC CATH POWER PICC SINGLE  2021         IR INS PICC VAD W SQ PORT GREATER THAN 5  2020

## 2024-10-17 PROBLEM — B37.9 CANDIDA INFECTION: Status: ACTIVE | Noted: 2024-10-17

## 2024-10-17 LAB
ANION GAP SERPL CALCULATED.3IONS-SCNC: 11 MMOL/L (ref 9–17)
BUN SERPL-MCNC: 24 MG/DL (ref 8–23)
BUN/CREAT SERPL: 18 (ref 9–20)
CALCIUM SERPL-MCNC: 8.7 MG/DL (ref 8.6–10.4)
CHLORIDE SERPL-SCNC: 103 MMOL/L (ref 98–107)
CO2 SERPL-SCNC: 23 MMOL/L (ref 20–31)
CREAT SERPL-MCNC: 1.3 MG/DL (ref 0.7–1.2)
ERYTHROCYTE [DISTWIDTH] IN BLOOD BY AUTOMATED COUNT: 14.2 % (ref 11.8–14.4)
GFR, ESTIMATED: 60 ML/MIN/1.73M2
GLUCOSE BLD-MCNC: 135 MG/DL (ref 75–110)
GLUCOSE BLD-MCNC: 180 MG/DL (ref 75–110)
GLUCOSE BLD-MCNC: 304 MG/DL (ref 75–110)
GLUCOSE BLD-MCNC: 375 MG/DL (ref 75–110)
GLUCOSE SERPL-MCNC: 231 MG/DL (ref 70–99)
HCT VFR BLD AUTO: 32.3 % (ref 40.7–50.3)
HGB BLD-MCNC: 9.3 G/DL (ref 13–17)
INR PPP: 1.9
MCH RBC QN AUTO: 27 PG (ref 25.2–33.5)
MCHC RBC AUTO-ENTMCNC: 28.8 G/DL (ref 28.4–34.8)
MCV RBC AUTO: 93.9 FL (ref 82.6–102.9)
NRBC BLD-RTO: 0 PER 100 WBC
PLATELET # BLD AUTO: 708 K/UL (ref 138–453)
PMV BLD AUTO: 8.9 FL (ref 8.1–13.5)
POTASSIUM SERPL-SCNC: 5.1 MMOL/L (ref 3.7–5.3)
PROTHROMBIN TIME: 22 SEC (ref 11.5–14.2)
RBC # BLD AUTO: 3.44 M/UL (ref 4.21–5.77)
SODIUM SERPL-SCNC: 137 MMOL/L (ref 135–144)
STONE COMPOSITION: NORMAL
STONE DESCRIPTION: NORMAL
STONE MASS: 57 MG
WBC OTHER # BLD: 14.8 K/UL (ref 3.5–11.3)

## 2024-10-17 PROCEDURE — 1200000000 HC SEMI PRIVATE

## 2024-10-17 PROCEDURE — 82947 ASSAY GLUCOSE BLOOD QUANT: CPT

## 2024-10-17 PROCEDURE — 6370000000 HC RX 637 (ALT 250 FOR IP): Performed by: UROLOGY

## 2024-10-17 PROCEDURE — 6370000000 HC RX 637 (ALT 250 FOR IP): Performed by: NURSE PRACTITIONER

## 2024-10-17 PROCEDURE — 85027 COMPLETE CBC AUTOMATED: CPT

## 2024-10-17 PROCEDURE — 2580000003 HC RX 258: Performed by: UROLOGY

## 2024-10-17 PROCEDURE — 6360000002 HC RX W HCPCS: Performed by: INTERNAL MEDICINE

## 2024-10-17 PROCEDURE — 85610 PROTHROMBIN TIME: CPT

## 2024-10-17 PROCEDURE — 6370000000 HC RX 637 (ALT 250 FOR IP)

## 2024-10-17 PROCEDURE — 99232 SBSQ HOSP IP/OBS MODERATE 35: CPT | Performed by: NURSE PRACTITIONER

## 2024-10-17 PROCEDURE — 6360000002 HC RX W HCPCS: Performed by: NURSE PRACTITIONER

## 2024-10-17 PROCEDURE — 99223 1ST HOSP IP/OBS HIGH 75: CPT | Performed by: INTERNAL MEDICINE

## 2024-10-17 PROCEDURE — 36415 COLL VENOUS BLD VENIPUNCTURE: CPT

## 2024-10-17 PROCEDURE — 51798 US URINE CAPACITY MEASURE: CPT

## 2024-10-17 PROCEDURE — 80048 BASIC METABOLIC PNL TOTAL CA: CPT

## 2024-10-17 PROCEDURE — 6370000000 HC RX 637 (ALT 250 FOR IP): Performed by: INTERNAL MEDICINE

## 2024-10-17 RX ORDER — VANCOMYCIN 1.75 G/350ML
1250 INJECTION, SOLUTION INTRAVENOUS EVERY 24 HOURS
Status: DISCONTINUED | OUTPATIENT
Start: 2024-10-18 | End: 2024-10-18 | Stop reason: SDUPTHER

## 2024-10-17 RX ORDER — MICONAZOLE NITRATE 20 MG/G
CREAM TOPICAL 2 TIMES DAILY
Status: DISCONTINUED | OUTPATIENT
Start: 2024-10-17 | End: 2024-10-21 | Stop reason: HOSPADM

## 2024-10-17 RX ORDER — FLUCONAZOLE 100 MG/1
200 TABLET ORAL DAILY
Status: COMPLETED | OUTPATIENT
Start: 2024-10-17 | End: 2024-10-21

## 2024-10-17 RX ORDER — VANCOMYCIN 1.5 G/300ML
25 INJECTION, SOLUTION INTRAVENOUS ONCE
Status: COMPLETED | OUTPATIENT
Start: 2024-10-17 | End: 2024-10-17

## 2024-10-17 RX ORDER — WARFARIN SODIUM 2.5 MG/1
2.5 TABLET ORAL
Status: COMPLETED | OUTPATIENT
Start: 2024-10-17 | End: 2024-10-17

## 2024-10-17 RX ADMIN — BUSPIRONE HYDROCHLORIDE 5 MG: 5 TABLET ORAL at 09:59

## 2024-10-17 RX ADMIN — PANCRELIPASE LIPASE, PANCRELIPASE PROTEASE, PANCRELIPASE AMYLASE 20000 UNITS: 20000; 63000; 84000 CAPSULE, DELAYED RELEASE ORAL at 12:16

## 2024-10-17 RX ADMIN — QUETIAPINE FUMARATE 50 MG: 25 TABLET ORAL at 09:59

## 2024-10-17 RX ADMIN — ANTI-FUNGAL POWDER MICONAZOLE NITRATE TALC FREE: 1.42 POWDER TOPICAL at 20:19

## 2024-10-17 RX ADMIN — QUETIAPINE FUMARATE 50 MG: 25 TABLET ORAL at 20:21

## 2024-10-17 RX ADMIN — OXYCODONE AND ACETAMINOPHEN 1 TABLET: 5; 325 TABLET ORAL at 12:16

## 2024-10-17 RX ADMIN — TAMSULOSIN HYDROCHLORIDE 0.4 MG: 0.4 CAPSULE ORAL at 20:21

## 2024-10-17 RX ADMIN — PANCRELIPASE LIPASE, PANCRELIPASE PROTEASE, PANCRELIPASE AMYLASE 20000 UNITS: 20000; 63000; 84000 CAPSULE, DELAYED RELEASE ORAL at 08:14

## 2024-10-17 RX ADMIN — PANCRELIPASE LIPASE, PANCRELIPASE PROTEASE, PANCRELIPASE AMYLASE 5000 UNITS: 5000; 17000; 24000 CAPSULE, DELAYED RELEASE ORAL at 12:16

## 2024-10-17 RX ADMIN — OXYBUTYNIN CHLORIDE 5 MG: 5 TABLET, EXTENDED RELEASE ORAL at 20:21

## 2024-10-17 RX ADMIN — SODIUM CHLORIDE, PRESERVATIVE FREE 10 ML: 5 INJECTION INTRAVENOUS at 12:06

## 2024-10-17 RX ADMIN — INSULIN GLARGINE 20 UNITS: 100 INJECTION, SOLUTION SUBCUTANEOUS at 07:53

## 2024-10-17 RX ADMIN — METOPROLOL TARTRATE 25 MG: 25 TABLET, FILM COATED ORAL at 20:21

## 2024-10-17 RX ADMIN — SODIUM CHLORIDE, PRESERVATIVE FREE 10 ML: 5 INJECTION INTRAVENOUS at 20:23

## 2024-10-17 RX ADMIN — PANCRELIPASE LIPASE, PANCRELIPASE PROTEASE, PANCRELIPASE AMYLASE 5000 UNITS: 5000; 17000; 24000 CAPSULE, DELAYED RELEASE ORAL at 08:15

## 2024-10-17 RX ADMIN — VANCOMYCIN 1500 MG: 1.5 INJECTION, SOLUTION INTRAVENOUS at 13:48

## 2024-10-17 RX ADMIN — BUSPIRONE HYDROCHLORIDE 5 MG: 5 TABLET ORAL at 20:20

## 2024-10-17 RX ADMIN — CHOLESTYRAMINE 4 G: 4 POWDER, FOR SUSPENSION ORAL at 09:59

## 2024-10-17 RX ADMIN — INSULIN LISPRO 4 UNITS: 100 INJECTION, SOLUTION INTRAVENOUS; SUBCUTANEOUS at 21:30

## 2024-10-17 RX ADMIN — VANCOMYCIN HYDROCHLORIDE 125 MG: 125 CAPSULE ORAL at 21:30

## 2024-10-17 RX ADMIN — TRAZODONE HYDROCHLORIDE 50 MG: 50 TABLET ORAL at 20:21

## 2024-10-17 RX ADMIN — OXYCODONE AND ACETAMINOPHEN 1 TABLET: 5; 325 TABLET ORAL at 05:20

## 2024-10-17 RX ADMIN — METOPROLOL TARTRATE 25 MG: 25 TABLET, FILM COATED ORAL at 09:58

## 2024-10-17 RX ADMIN — INSULIN LISPRO 1 UNITS: 100 INJECTION, SOLUTION INTRAVENOUS; SUBCUTANEOUS at 12:16

## 2024-10-17 RX ADMIN — DIPHENHYDRAMINE HYDROCHLORIDE 50 MG: 50 INJECTION INTRAMUSCULAR; INTRAVENOUS at 05:43

## 2024-10-17 RX ADMIN — WARFARIN SODIUM 2.5 MG: 2.5 TABLET ORAL at 18:23

## 2024-10-17 RX ADMIN — Medication 1 CAPSULE: at 18:23

## 2024-10-17 RX ADMIN — Medication 1 CAPSULE: at 08:12

## 2024-10-17 RX ADMIN — OXYCODONE AND ACETAMINOPHEN 1 TABLET: 5; 325 TABLET ORAL at 20:21

## 2024-10-17 RX ADMIN — ANTI-FUNGAL POWDER MICONAZOLE NITRATE TALC FREE: 1.42 POWDER TOPICAL at 09:59

## 2024-10-17 RX ADMIN — SODIUM BICARBONATE 1300 MG: 650 TABLET ORAL at 09:59

## 2024-10-17 RX ADMIN — INSULIN LISPRO 3 UNITS: 100 INJECTION, SOLUTION INTRAVENOUS; SUBCUTANEOUS at 07:53

## 2024-10-17 RX ADMIN — FERROUS SULFATE TAB EC 325 MG (65 MG FE EQUIVALENT) 325 MG: 325 (65 FE) TABLET DELAYED RESPONSE at 08:12

## 2024-10-17 RX ADMIN — PANCRELIPASE LIPASE, PANCRELIPASE PROTEASE, PANCRELIPASE AMYLASE 5000 UNITS: 5000; 17000; 24000 CAPSULE, DELAYED RELEASE ORAL at 18:23

## 2024-10-17 RX ADMIN — DIPHENHYDRAMINE HYDROCHLORIDE 50 MG: 50 INJECTION INTRAMUSCULAR; INTRAVENOUS at 20:20

## 2024-10-17 RX ADMIN — GUAIFENESIN 600 MG: 600 TABLET ORAL at 09:59

## 2024-10-17 RX ADMIN — FAMOTIDINE 20 MG: 20 TABLET, FILM COATED ORAL at 09:58

## 2024-10-17 RX ADMIN — PANCRELIPASE LIPASE, PANCRELIPASE PROTEASE, PANCRELIPASE AMYLASE 20000 UNITS: 20000; 63000; 84000 CAPSULE, DELAYED RELEASE ORAL at 18:23

## 2024-10-17 RX ADMIN — SODIUM BICARBONATE 1300 MG: 650 TABLET ORAL at 20:21

## 2024-10-17 RX ADMIN — DIPHENHYDRAMINE HYDROCHLORIDE 50 MG: 50 INJECTION INTRAMUSCULAR; INTRAVENOUS at 12:16

## 2024-10-17 RX ADMIN — TAMSULOSIN HYDROCHLORIDE 0.4 MG: 0.4 CAPSULE ORAL at 09:58

## 2024-10-17 RX ADMIN — Medication 1 CAPSULE: at 12:16

## 2024-10-17 RX ADMIN — FLUCONAZOLE 200 MG: 100 TABLET ORAL at 13:49

## 2024-10-17 RX ADMIN — CLOTRIMAZOLE AND BETAMETHASONE DIPROPIONATE: 10; .5 CREAM TOPICAL at 09:59

## 2024-10-17 RX ADMIN — MICONAZOLE NITRATE: 20 CREAM TOPICAL at 21:30

## 2024-10-17 RX ADMIN — CLOTRIMAZOLE AND BETAMETHASONE DIPROPIONATE: 10; .5 CREAM TOPICAL at 20:19

## 2024-10-17 RX ADMIN — GUAIFENESIN 600 MG: 600 TABLET ORAL at 20:20

## 2024-10-17 ASSESSMENT — PAIN DESCRIPTION - FREQUENCY: FREQUENCY: CONTINUOUS

## 2024-10-17 ASSESSMENT — PAIN DESCRIPTION - ORIENTATION
ORIENTATION: MID;LOWER
ORIENTATION: MID;LOWER

## 2024-10-17 ASSESSMENT — PAIN SCALES - GENERAL
PAINLEVEL_OUTOF10: 7
PAINLEVEL_OUTOF10: 9
PAINLEVEL_OUTOF10: 8
PAINLEVEL_OUTOF10: 5
PAINLEVEL_OUTOF10: 8

## 2024-10-17 ASSESSMENT — PAIN DESCRIPTION - PAIN TYPE: TYPE: ACUTE PAIN

## 2024-10-17 ASSESSMENT — PAIN DESCRIPTION - DESCRIPTORS
DESCRIPTORS: ACHING;DISCOMFORT
DESCRIPTORS: ACHING
DESCRIPTORS: ACHING;DISCOMFORT;DULL

## 2024-10-17 ASSESSMENT — PAIN DESCRIPTION - LOCATION
LOCATION: GENERALIZED
LOCATION: ABDOMEN;SCROTUM
LOCATION: ABDOMEN;SCROTUM

## 2024-10-17 ASSESSMENT — PAIN DESCRIPTION - ONSET: ONSET: ON-GOING

## 2024-10-17 NOTE — CARE COORDINATION
Social Work-Met with patient to discuss SFN preference. Discussed locations that are covered under his insurance. He would like a referral to [facility] and [facility]. Sent referral. Beto

## 2024-10-18 ENCOUNTER — APPOINTMENT (OUTPATIENT)
Dept: GENERAL RADIOLOGY | Age: 69
DRG: 659 | End: 2024-10-18
Payer: COMMERCIAL

## 2024-10-18 LAB
ANION GAP SERPL CALCULATED.3IONS-SCNC: 10 MMOL/L (ref 9–17)
BUN SERPL-MCNC: 32 MG/DL (ref 8–23)
BUN SERPL-MCNC: 37 MG/DL (ref 8–23)
BUN/CREAT SERPL: 21 (ref 9–20)
CALCIUM SERPL-MCNC: 8.6 MG/DL (ref 8.6–10.4)
CHLORIDE SERPL-SCNC: 106 MMOL/L (ref 98–107)
CO2 SERPL-SCNC: 22 MMOL/L (ref 20–31)
CREAT SERPL-MCNC: 1.4 MG/DL (ref 0.7–1.2)
CREAT SERPL-MCNC: 1.5 MG/DL (ref 0.7–1.2)
ERYTHROCYTE [DISTWIDTH] IN BLOOD BY AUTOMATED COUNT: 14.1 % (ref 11.8–14.4)
GFR, ESTIMATED: 51 ML/MIN/1.73M2
GFR, ESTIMATED: 55 ML/MIN/1.73M2
GLUCOSE BLD-MCNC: 105 MG/DL (ref 75–110)
GLUCOSE BLD-MCNC: 129 MG/DL (ref 75–110)
GLUCOSE BLD-MCNC: 140 MG/DL (ref 75–110)
GLUCOSE BLD-MCNC: 147 MG/DL (ref 75–110)
GLUCOSE BLD-MCNC: 174 MG/DL (ref 75–110)
GLUCOSE BLD-MCNC: 222 MG/DL (ref 75–110)
GLUCOSE BLD-MCNC: 224 MG/DL (ref 75–110)
GLUCOSE SERPL-MCNC: 143 MG/DL (ref 70–99)
HCT VFR BLD AUTO: 29.7 % (ref 40.7–50.3)
HGB BLD-MCNC: 8.8 G/DL (ref 13–17)
INR PPP: 2
MCH RBC QN AUTO: 27.4 PG (ref 25.2–33.5)
MCHC RBC AUTO-ENTMCNC: 29.6 G/DL (ref 28.4–34.8)
MCV RBC AUTO: 92.5 FL (ref 82.6–102.9)
NRBC BLD-RTO: 0 PER 100 WBC
PLATELET # BLD AUTO: 639 K/UL (ref 138–453)
PMV BLD AUTO: 8.7 FL (ref 8.1–13.5)
POTASSIUM SERPL-SCNC: 5.8 MMOL/L (ref 3.7–5.3)
POTASSIUM SERPL-SCNC: 6.2 MMOL/L (ref 3.7–5.3)
POTASSIUM SERPL-SCNC: 6.4 MMOL/L (ref 3.7–5.3)
PROTHROMBIN TIME: 22.9 SEC (ref 11.5–14.2)
RBC # BLD AUTO: 3.21 M/UL (ref 4.21–5.77)
SODIUM SERPL-SCNC: 138 MMOL/L (ref 135–144)
WBC OTHER # BLD: 17.2 K/UL (ref 3.5–11.3)

## 2024-10-18 PROCEDURE — 6370000000 HC RX 637 (ALT 250 FOR IP): Performed by: NURSE PRACTITIONER

## 2024-10-18 PROCEDURE — 99231 SBSQ HOSP IP/OBS SF/LOW 25: CPT | Performed by: NURSE PRACTITIONER

## 2024-10-18 PROCEDURE — 1200000000 HC SEMI PRIVATE

## 2024-10-18 PROCEDURE — 6360000002 HC RX W HCPCS: Performed by: FAMILY MEDICINE

## 2024-10-18 PROCEDURE — 82565 ASSAY OF CREATININE: CPT

## 2024-10-18 PROCEDURE — 6360000002 HC RX W HCPCS: Performed by: NURSE PRACTITIONER

## 2024-10-18 PROCEDURE — 2580000003 HC RX 258: Performed by: NURSE PRACTITIONER

## 2024-10-18 PROCEDURE — 71045 X-RAY EXAM CHEST 1 VIEW: CPT

## 2024-10-18 PROCEDURE — 6370000000 HC RX 637 (ALT 250 FOR IP): Performed by: UROLOGY

## 2024-10-18 PROCEDURE — 6370000000 HC RX 637 (ALT 250 FOR IP): Performed by: INTERNAL MEDICINE

## 2024-10-18 PROCEDURE — 84520 ASSAY OF UREA NITROGEN: CPT

## 2024-10-18 PROCEDURE — 99232 SBSQ HOSP IP/OBS MODERATE 35: CPT | Performed by: INTERNAL MEDICINE

## 2024-10-18 PROCEDURE — 6370000000 HC RX 637 (ALT 250 FOR IP): Performed by: FAMILY MEDICINE

## 2024-10-18 PROCEDURE — 2580000003 HC RX 258: Performed by: FAMILY MEDICINE

## 2024-10-18 PROCEDURE — 84132 ASSAY OF SERUM POTASSIUM: CPT

## 2024-10-18 PROCEDURE — 36415 COLL VENOUS BLD VENIPUNCTURE: CPT

## 2024-10-18 PROCEDURE — 2580000003 HC RX 258: Performed by: UROLOGY

## 2024-10-18 PROCEDURE — 82947 ASSAY GLUCOSE BLOOD QUANT: CPT

## 2024-10-18 PROCEDURE — 85027 COMPLETE CBC AUTOMATED: CPT

## 2024-10-18 PROCEDURE — 85610 PROTHROMBIN TIME: CPT

## 2024-10-18 PROCEDURE — 80048 BASIC METABOLIC PNL TOTAL CA: CPT

## 2024-10-18 RX ORDER — DEXTROSE MONOHYDRATE 100 MG/ML
INJECTION, SOLUTION INTRAVENOUS CONTINUOUS PRN
Status: DISCONTINUED | OUTPATIENT
Start: 2024-10-18 | End: 2024-10-21 | Stop reason: HOSPADM

## 2024-10-18 RX ORDER — WARFARIN SODIUM 2.5 MG/1
2.5 TABLET ORAL
Status: COMPLETED | OUTPATIENT
Start: 2024-10-18 | End: 2024-10-18

## 2024-10-18 RX ORDER — VANCOMYCIN 1.75 G/350ML
1250 INJECTION, SOLUTION INTRAVENOUS EVERY 24 HOURS
Status: DISCONTINUED | OUTPATIENT
Start: 2024-10-18 | End: 2024-10-18

## 2024-10-18 RX ORDER — OXYCODONE AND ACETAMINOPHEN 5; 325 MG/1; MG/1
1 TABLET ORAL ONCE
Status: COMPLETED | OUTPATIENT
Start: 2024-10-18 | End: 2024-10-18

## 2024-10-18 RX ORDER — OXYCODONE AND ACETAMINOPHEN 5; 325 MG/1; MG/1
1 TABLET ORAL EVERY 6 HOURS PRN
Status: DISCONTINUED | OUTPATIENT
Start: 2024-10-18 | End: 2024-10-21 | Stop reason: HOSPADM

## 2024-10-18 RX ORDER — MORPHINE SULFATE 2 MG/ML
2 INJECTION, SOLUTION INTRAMUSCULAR; INTRAVENOUS ONCE
Status: COMPLETED | OUTPATIENT
Start: 2024-10-18 | End: 2024-10-19

## 2024-10-18 RX ORDER — DEXTROSE MONOHYDRATE 100 MG/ML
INJECTION, SOLUTION INTRAVENOUS CONTINUOUS PRN
Status: DISCONTINUED | OUTPATIENT
Start: 2024-10-18 | End: 2024-10-19 | Stop reason: SDUPTHER

## 2024-10-18 RX ORDER — HYDROCODONE BITARTRATE AND ACETAMINOPHEN 5; 325 MG/1; MG/1
1 TABLET ORAL EVERY 6 HOURS PRN
Status: DISCONTINUED | OUTPATIENT
Start: 2024-10-18 | End: 2024-10-18

## 2024-10-18 RX ADMIN — BUSPIRONE HYDROCHLORIDE 5 MG: 5 TABLET ORAL at 08:38

## 2024-10-18 RX ADMIN — FLUCONAZOLE 200 MG: 100 TABLET ORAL at 08:38

## 2024-10-18 RX ADMIN — OXYBUTYNIN CHLORIDE 5 MG: 5 TABLET, EXTENDED RELEASE ORAL at 21:16

## 2024-10-18 RX ADMIN — FERROUS SULFATE TAB EC 325 MG (65 MG FE EQUIVALENT) 325 MG: 325 (65 FE) TABLET DELAYED RESPONSE at 08:39

## 2024-10-18 RX ADMIN — PANCRELIPASE LIPASE, PANCRELIPASE PROTEASE, PANCRELIPASE AMYLASE 5000 UNITS: 5000; 17000; 24000 CAPSULE, DELAYED RELEASE ORAL at 17:11

## 2024-10-18 RX ADMIN — Medication 1 CAPSULE: at 12:15

## 2024-10-18 RX ADMIN — SODIUM BICARBONATE 1300 MG: 650 TABLET ORAL at 21:16

## 2024-10-18 RX ADMIN — DIPHENHYDRAMINE HYDROCHLORIDE 50 MG: 50 INJECTION INTRAMUSCULAR; INTRAVENOUS at 21:16

## 2024-10-18 RX ADMIN — FAMOTIDINE 20 MG: 20 TABLET, FILM COATED ORAL at 08:39

## 2024-10-18 RX ADMIN — MICONAZOLE NITRATE: 20 CREAM TOPICAL at 08:44

## 2024-10-18 RX ADMIN — PANCRELIPASE LIPASE, PANCRELIPASE PROTEASE, PANCRELIPASE AMYLASE 5000 UNITS: 5000; 17000; 24000 CAPSULE, DELAYED RELEASE ORAL at 08:38

## 2024-10-18 RX ADMIN — WARFARIN SODIUM 2.5 MG: 2.5 TABLET ORAL at 17:11

## 2024-10-18 RX ADMIN — SODIUM CHLORIDE, PRESERVATIVE FREE 10 ML: 5 INJECTION INTRAVENOUS at 21:17

## 2024-10-18 RX ADMIN — DEXTROSE MONOHYDRATE 250 ML: 100 INJECTION, SOLUTION INTRAVENOUS at 21:33

## 2024-10-18 RX ADMIN — DIPHENHYDRAMINE HYDROCHLORIDE 50 MG: 50 INJECTION INTRAMUSCULAR; INTRAVENOUS at 10:13

## 2024-10-18 RX ADMIN — Medication 1 CAPSULE: at 08:38

## 2024-10-18 RX ADMIN — INSULIN HUMAN 10 UNITS: 100 INJECTION, SOLUTION PARENTERAL at 08:45

## 2024-10-18 RX ADMIN — SODIUM BICARBONATE 1300 MG: 650 TABLET ORAL at 08:38

## 2024-10-18 RX ADMIN — QUETIAPINE FUMARATE 50 MG: 25 TABLET ORAL at 21:16

## 2024-10-18 RX ADMIN — QUETIAPINE FUMARATE 50 MG: 25 TABLET ORAL at 08:39

## 2024-10-18 RX ADMIN — HYDROMORPHONE HYDROCHLORIDE 0.25 MG: 1 INJECTION, SOLUTION INTRAMUSCULAR; INTRAVENOUS; SUBCUTANEOUS at 10:30

## 2024-10-18 RX ADMIN — CHOLESTYRAMINE 4 G: 4 POWDER, FOR SUSPENSION ORAL at 08:41

## 2024-10-18 RX ADMIN — SODIUM ZIRCONIUM CYCLOSILICATE 5 G: 5 POWDER, FOR SUSPENSION ORAL at 12:16

## 2024-10-18 RX ADMIN — PANCRELIPASE LIPASE, PANCRELIPASE PROTEASE, PANCRELIPASE AMYLASE 5000 UNITS: 5000; 17000; 24000 CAPSULE, DELAYED RELEASE ORAL at 12:15

## 2024-10-18 RX ADMIN — VANCOMYCIN HYDROCHLORIDE 1250 MG: 5 INJECTION, POWDER, LYOPHILIZED, FOR SOLUTION INTRAVENOUS at 14:54

## 2024-10-18 RX ADMIN — TRAZODONE HYDROCHLORIDE 50 MG: 50 TABLET ORAL at 21:16

## 2024-10-18 RX ADMIN — GUAIFENESIN 600 MG: 600 TABLET ORAL at 21:16

## 2024-10-18 RX ADMIN — TAMSULOSIN HYDROCHLORIDE 0.4 MG: 0.4 CAPSULE ORAL at 21:16

## 2024-10-18 RX ADMIN — OXYCODONE HYDROCHLORIDE AND ACETAMINOPHEN 1 TABLET: 5; 325 TABLET ORAL at 00:35

## 2024-10-18 RX ADMIN — BUSPIRONE HYDROCHLORIDE 5 MG: 5 TABLET ORAL at 21:16

## 2024-10-18 RX ADMIN — CLOTRIMAZOLE AND BETAMETHASONE DIPROPIONATE: 10; .5 CREAM TOPICAL at 21:15

## 2024-10-18 RX ADMIN — GUAIFENESIN 600 MG: 600 TABLET ORAL at 08:39

## 2024-10-18 RX ADMIN — OXYCODONE AND ACETAMINOPHEN 1 TABLET: 5; 325 TABLET ORAL at 21:16

## 2024-10-18 RX ADMIN — PANCRELIPASE LIPASE, PANCRELIPASE PROTEASE, PANCRELIPASE AMYLASE 20000 UNITS: 20000; 63000; 84000 CAPSULE, DELAYED RELEASE ORAL at 08:38

## 2024-10-18 RX ADMIN — INSULIN GLARGINE 20 UNITS: 100 INJECTION, SOLUTION SUBCUTANEOUS at 08:42

## 2024-10-18 RX ADMIN — INSULIN HUMAN 10 UNITS: 100 INJECTION, SOLUTION PARENTERAL at 21:29

## 2024-10-18 RX ADMIN — TAMSULOSIN HYDROCHLORIDE 0.4 MG: 0.4 CAPSULE ORAL at 08:38

## 2024-10-18 RX ADMIN — VANCOMYCIN HYDROCHLORIDE 125 MG: 125 CAPSULE ORAL at 22:46

## 2024-10-18 RX ADMIN — ONDANSETRON 4 MG: 4 TABLET, ORALLY DISINTEGRATING ORAL at 00:40

## 2024-10-18 RX ADMIN — METOPROLOL TARTRATE 25 MG: 25 TABLET, FILM COATED ORAL at 08:38

## 2024-10-18 RX ADMIN — SODIUM CHLORIDE, PRESERVATIVE FREE 10 ML: 5 INJECTION INTRAVENOUS at 09:00

## 2024-10-18 RX ADMIN — PANCRELIPASE LIPASE, PANCRELIPASE PROTEASE, PANCRELIPASE AMYLASE 20000 UNITS: 20000; 63000; 84000 CAPSULE, DELAYED RELEASE ORAL at 17:11

## 2024-10-18 RX ADMIN — CLOTRIMAZOLE AND BETAMETHASONE DIPROPIONATE: 10; .5 CREAM TOPICAL at 08:48

## 2024-10-18 RX ADMIN — METOPROLOL TARTRATE 25 MG: 25 TABLET, FILM COATED ORAL at 21:16

## 2024-10-18 RX ADMIN — OXYCODONE AND ACETAMINOPHEN 1 TABLET: 5; 325 TABLET ORAL at 08:38

## 2024-10-18 RX ADMIN — PANCRELIPASE LIPASE, PANCRELIPASE PROTEASE, PANCRELIPASE AMYLASE 20000 UNITS: 20000; 63000; 84000 CAPSULE, DELAYED RELEASE ORAL at 12:15

## 2024-10-18 RX ADMIN — MICONAZOLE NITRATE: 20 CREAM TOPICAL at 21:15

## 2024-10-18 RX ADMIN — HYDROCODONE BITARTRATE AND ACETAMINOPHEN 1 TABLET: 5; 325 TABLET ORAL at 14:46

## 2024-10-18 RX ADMIN — Medication 1 CAPSULE: at 17:11

## 2024-10-18 ASSESSMENT — PAIN DESCRIPTION - DESCRIPTORS
DESCRIPTORS: ACHING;DISCOMFORT
DESCRIPTORS: ACHING;SHARP
DESCRIPTORS: ACHING;SHARP
DESCRIPTORS: ACHING;DISCOMFORT
DESCRIPTORS: ACHING

## 2024-10-18 ASSESSMENT — PAIN SCALES - GENERAL
PAINLEVEL_OUTOF10: 5
PAINLEVEL_OUTOF10: 8
PAINLEVEL_OUTOF10: 6
PAINLEVEL_OUTOF10: 7
PAINLEVEL_OUTOF10: 9
PAINLEVEL_OUTOF10: 8
PAINLEVEL_OUTOF10: 9

## 2024-10-18 ASSESSMENT — PAIN - FUNCTIONAL ASSESSMENT
PAIN_FUNCTIONAL_ASSESSMENT: ACTIVITIES ARE NOT PREVENTED

## 2024-10-18 ASSESSMENT — PAIN DESCRIPTION - LOCATION
LOCATION: ABDOMEN;FOOT;LEG
LOCATION: SCROTUM;GROIN
LOCATION: GENERALIZED
LOCATION: GENERALIZED

## 2024-10-18 ASSESSMENT — PAIN DESCRIPTION - ORIENTATION
ORIENTATION: RIGHT;LEFT
ORIENTATION: LEFT;RIGHT
ORIENTATION: RIGHT;LEFT;ANTERIOR;LOWER

## 2024-10-18 NOTE — CARE COORDINATION
Writer sent SNF referrals to [facility], [facility], [facility], [facility], and [facility], per request of LAVELL Sun[facility].

## 2024-10-18 NOTE — CARE COORDINATION
Social Work-[facility] will not have bed for patient. [facility] approved patient for admission and will begin auth. Beto

## 2024-10-19 LAB
ANION GAP SERPL CALCULATED.3IONS-SCNC: 10 MMOL/L (ref 9–17)
ANION GAP SERPL CALCULATED.3IONS-SCNC: 10 MMOL/L (ref 9–17)
ANION GAP SERPL CALCULATED.3IONS-SCNC: 11 MMOL/L (ref 9–17)
BUN SERPL-MCNC: 30 MG/DL (ref 8–23)
BUN SERPL-MCNC: 35 MG/DL (ref 8–23)
BUN SERPL-MCNC: 37 MG/DL (ref 8–23)
BUN/CREAT SERPL: 20 (ref 9–20)
BUN/CREAT SERPL: 22 (ref 9–20)
BUN/CREAT SERPL: 22 (ref 9–20)
CALCIUM SERPL-MCNC: 8.6 MG/DL (ref 8.6–10.4)
CALCIUM SERPL-MCNC: 8.7 MG/DL (ref 8.6–10.4)
CALCIUM SERPL-MCNC: 8.7 MG/DL (ref 8.6–10.4)
CHLORIDE SERPL-SCNC: 101 MMOL/L (ref 98–107)
CHLORIDE SERPL-SCNC: 103 MMOL/L (ref 98–107)
CHLORIDE SERPL-SCNC: 105 MMOL/L (ref 98–107)
CO2 SERPL-SCNC: 21 MMOL/L (ref 20–31)
CO2 SERPL-SCNC: 22 MMOL/L (ref 20–31)
CO2 SERPL-SCNC: 23 MMOL/L (ref 20–31)
CREAT SERPL-MCNC: 1.5 MG/DL (ref 0.7–1.2)
CREAT SERPL-MCNC: 1.6 MG/DL (ref 0.7–1.2)
CREAT SERPL-MCNC: 1.7 MG/DL (ref 0.7–1.2)
ERYTHROCYTE [DISTWIDTH] IN BLOOD BY AUTOMATED COUNT: 14.5 % (ref 11.8–14.4)
GFR, ESTIMATED: 44 ML/MIN/1.73M2
GFR, ESTIMATED: 47 ML/MIN/1.73M2
GFR, ESTIMATED: 51 ML/MIN/1.73M2
GLUCOSE BLD-MCNC: 105 MG/DL (ref 75–110)
GLUCOSE BLD-MCNC: 122 MG/DL (ref 75–110)
GLUCOSE BLD-MCNC: 141 MG/DL (ref 75–110)
GLUCOSE BLD-MCNC: 176 MG/DL (ref 75–110)
GLUCOSE BLD-MCNC: 221 MG/DL (ref 75–110)
GLUCOSE BLD-MCNC: 98 MG/DL (ref 75–110)
GLUCOSE SERPL-MCNC: 246 MG/DL (ref 70–99)
GLUCOSE SERPL-MCNC: 99 MG/DL (ref 70–99)
GLUCOSE SERPL-MCNC: 99 MG/DL (ref 70–99)
HCT VFR BLD AUTO: 29.1 % (ref 40.7–50.3)
HGB BLD-MCNC: 9 G/DL (ref 13–17)
INR PPP: 1.4
MCH RBC QN AUTO: 27.5 PG (ref 25.2–33.5)
MCHC RBC AUTO-ENTMCNC: 30.9 G/DL (ref 28.4–34.8)
MCV RBC AUTO: 89 FL (ref 82.6–102.9)
NRBC BLD-RTO: 0 PER 100 WBC
PLATELET # BLD AUTO: 643 K/UL (ref 138–453)
PMV BLD AUTO: 8.5 FL (ref 8.1–13.5)
POTASSIUM SERPL-SCNC: 5.7 MMOL/L (ref 3.7–5.3)
POTASSIUM SERPL-SCNC: 5.8 MMOL/L (ref 3.7–5.3)
POTASSIUM SERPL-SCNC: 6.2 MMOL/L (ref 3.7–5.3)
PROTHROMBIN TIME: 17.1 SEC (ref 11.5–14.2)
RBC # BLD AUTO: 3.27 M/UL (ref 4.21–5.77)
SODIUM SERPL-SCNC: 134 MMOL/L (ref 135–144)
SODIUM SERPL-SCNC: 135 MMOL/L (ref 135–144)
SODIUM SERPL-SCNC: 137 MMOL/L (ref 135–144)
VANCOMYCIN SERPL-MCNC: 19.6 UG/ML
WBC OTHER # BLD: 14.1 K/UL (ref 3.5–11.3)

## 2024-10-19 PROCEDURE — 6370000000 HC RX 637 (ALT 250 FOR IP): Performed by: INTERNAL MEDICINE

## 2024-10-19 PROCEDURE — 2580000003 HC RX 258: Performed by: INTERNAL MEDICINE

## 2024-10-19 PROCEDURE — 36415 COLL VENOUS BLD VENIPUNCTURE: CPT

## 2024-10-19 PROCEDURE — 1200000000 HC SEMI PRIVATE

## 2024-10-19 PROCEDURE — 2580000003 HC RX 258: Performed by: UROLOGY

## 2024-10-19 PROCEDURE — 6370000000 HC RX 637 (ALT 250 FOR IP): Performed by: NURSE PRACTITIONER

## 2024-10-19 PROCEDURE — 6360000002 HC RX W HCPCS: Performed by: NURSE PRACTITIONER

## 2024-10-19 PROCEDURE — 85610 PROTHROMBIN TIME: CPT

## 2024-10-19 PROCEDURE — 6370000000 HC RX 637 (ALT 250 FOR IP): Performed by: STUDENT IN AN ORGANIZED HEALTH CARE EDUCATION/TRAINING PROGRAM

## 2024-10-19 PROCEDURE — 6370000000 HC RX 637 (ALT 250 FOR IP): Performed by: UROLOGY

## 2024-10-19 PROCEDURE — 82436 ASSAY OF URINE CHLORIDE: CPT

## 2024-10-19 PROCEDURE — 99232 SBSQ HOSP IP/OBS MODERATE 35: CPT | Performed by: INTERNAL MEDICINE

## 2024-10-19 PROCEDURE — 80048 BASIC METABOLIC PNL TOTAL CA: CPT

## 2024-10-19 PROCEDURE — 82947 ASSAY GLUCOSE BLOOD QUANT: CPT

## 2024-10-19 PROCEDURE — 85027 COMPLETE CBC AUTOMATED: CPT

## 2024-10-19 PROCEDURE — 99232 SBSQ HOSP IP/OBS MODERATE 35: CPT | Performed by: NURSE PRACTITIONER

## 2024-10-19 PROCEDURE — 80202 ASSAY OF VANCOMYCIN: CPT

## 2024-10-19 PROCEDURE — 84133 ASSAY OF URINE POTASSIUM: CPT

## 2024-10-19 RX ORDER — WARFARIN SODIUM 10 MG/1
10 TABLET ORAL
Status: COMPLETED | OUTPATIENT
Start: 2024-10-19 | End: 2024-10-19

## 2024-10-19 RX ORDER — SODIUM CHLORIDE 9 MG/ML
INJECTION, SOLUTION INTRAVENOUS CONTINUOUS
Status: DISCONTINUED | OUTPATIENT
Start: 2024-10-19 | End: 2024-10-21 | Stop reason: HOSPADM

## 2024-10-19 RX ADMIN — METOPROLOL TARTRATE 25 MG: 25 TABLET, FILM COATED ORAL at 22:04

## 2024-10-19 RX ADMIN — Medication 1 CAPSULE: at 09:00

## 2024-10-19 RX ADMIN — OXYCODONE AND ACETAMINOPHEN 1 TABLET: 5; 325 TABLET ORAL at 04:03

## 2024-10-19 RX ADMIN — TAMSULOSIN HYDROCHLORIDE 0.4 MG: 0.4 CAPSULE ORAL at 22:03

## 2024-10-19 RX ADMIN — SODIUM CHLORIDE: 9 INJECTION, SOLUTION INTRAVENOUS at 08:51

## 2024-10-19 RX ADMIN — SODIUM BICARBONATE 1300 MG: 650 TABLET ORAL at 22:03

## 2024-10-19 RX ADMIN — VANCOMYCIN HYDROCHLORIDE 125 MG: 125 CAPSULE ORAL at 22:03

## 2024-10-19 RX ADMIN — CLOTRIMAZOLE AND BETAMETHASONE DIPROPIONATE: 10; .5 CREAM TOPICAL at 09:07

## 2024-10-19 RX ADMIN — BUSPIRONE HYDROCHLORIDE 5 MG: 5 TABLET ORAL at 22:04

## 2024-10-19 RX ADMIN — FLUCONAZOLE 200 MG: 100 TABLET ORAL at 09:00

## 2024-10-19 RX ADMIN — DIPHENHYDRAMINE HYDROCHLORIDE 50 MG: 50 INJECTION INTRAMUSCULAR; INTRAVENOUS at 18:51

## 2024-10-19 RX ADMIN — BUSPIRONE HYDROCHLORIDE 5 MG: 5 TABLET ORAL at 09:00

## 2024-10-19 RX ADMIN — MICONAZOLE NITRATE: 20 CREAM TOPICAL at 22:06

## 2024-10-19 RX ADMIN — GUAIFENESIN 600 MG: 600 TABLET ORAL at 09:00

## 2024-10-19 RX ADMIN — SODIUM ZIRCONIUM CYCLOSILICATE 10 G: 10 POWDER, FOR SUSPENSION ORAL at 08:59

## 2024-10-19 RX ADMIN — FERROUS SULFATE TAB EC 325 MG (65 MG FE EQUIVALENT) 325 MG: 325 (65 FE) TABLET DELAYED RESPONSE at 09:00

## 2024-10-19 RX ADMIN — FAMOTIDINE 20 MG: 20 TABLET, FILM COATED ORAL at 09:00

## 2024-10-19 RX ADMIN — PANCRELIPASE LIPASE, PANCRELIPASE PROTEASE, PANCRELIPASE AMYLASE 5000 UNITS: 5000; 17000; 24000 CAPSULE, DELAYED RELEASE ORAL at 17:12

## 2024-10-19 RX ADMIN — MORPHINE SULFATE 2 MG: 2 INJECTION, SOLUTION INTRAMUSCULAR; INTRAVENOUS at 00:11

## 2024-10-19 RX ADMIN — TAMSULOSIN HYDROCHLORIDE 0.4 MG: 0.4 CAPSULE ORAL at 09:00

## 2024-10-19 RX ADMIN — Medication 1 CAPSULE: at 17:13

## 2024-10-19 RX ADMIN — PANCRELIPASE LIPASE, PANCRELIPASE PROTEASE, PANCRELIPASE AMYLASE 20000 UNITS: 20000; 63000; 84000 CAPSULE, DELAYED RELEASE ORAL at 13:50

## 2024-10-19 RX ADMIN — CLOTRIMAZOLE AND BETAMETHASONE DIPROPIONATE: 10; .5 CREAM TOPICAL at 22:05

## 2024-10-19 RX ADMIN — MICONAZOLE NITRATE: 20 CREAM TOPICAL at 09:07

## 2024-10-19 RX ADMIN — INSULIN LISPRO 1 UNITS: 100 INJECTION, SOLUTION INTRAVENOUS; SUBCUTANEOUS at 22:03

## 2024-10-19 RX ADMIN — TRAZODONE HYDROCHLORIDE 50 MG: 50 TABLET ORAL at 22:04

## 2024-10-19 RX ADMIN — GUAIFENESIN 600 MG: 600 TABLET ORAL at 22:05

## 2024-10-19 RX ADMIN — SODIUM BICARBONATE 1300 MG: 650 TABLET ORAL at 09:00

## 2024-10-19 RX ADMIN — DIPHENHYDRAMINE HYDROCHLORIDE 50 MG: 50 INJECTION INTRAMUSCULAR; INTRAVENOUS at 04:02

## 2024-10-19 RX ADMIN — QUETIAPINE FUMARATE 50 MG: 25 TABLET ORAL at 22:03

## 2024-10-19 RX ADMIN — PANCRELIPASE LIPASE, PANCRELIPASE PROTEASE, PANCRELIPASE AMYLASE 5000 UNITS: 5000; 17000; 24000 CAPSULE, DELAYED RELEASE ORAL at 13:50

## 2024-10-19 RX ADMIN — WARFARIN SODIUM 10 MG: 10 TABLET ORAL at 18:45

## 2024-10-19 RX ADMIN — PANCRELIPASE LIPASE, PANCRELIPASE PROTEASE, PANCRELIPASE AMYLASE 5000 UNITS: 5000; 17000; 24000 CAPSULE, DELAYED RELEASE ORAL at 09:00

## 2024-10-19 RX ADMIN — SODIUM CHLORIDE, PRESERVATIVE FREE 10 ML: 5 INJECTION INTRAVENOUS at 08:49

## 2024-10-19 RX ADMIN — OXYCODONE AND ACETAMINOPHEN 1 TABLET: 5; 325 TABLET ORAL at 15:22

## 2024-10-19 RX ADMIN — SIMETHICONE 160 MG: 80 TABLET, CHEWABLE ORAL at 22:14

## 2024-10-19 RX ADMIN — OXYCODONE AND ACETAMINOPHEN 1 TABLET: 5; 325 TABLET ORAL at 22:04

## 2024-10-19 RX ADMIN — INSULIN GLARGINE 20 UNITS: 100 INJECTION, SOLUTION SUBCUTANEOUS at 08:58

## 2024-10-19 RX ADMIN — QUETIAPINE FUMARATE 50 MG: 25 TABLET ORAL at 09:00

## 2024-10-19 RX ADMIN — SODIUM CHLORIDE: 9 INJECTION, SOLUTION INTRAVENOUS at 23:01

## 2024-10-19 RX ADMIN — OXYBUTYNIN CHLORIDE 5 MG: 5 TABLET, EXTENDED RELEASE ORAL at 22:04

## 2024-10-19 RX ADMIN — METOPROLOL TARTRATE 25 MG: 25 TABLET, FILM COATED ORAL at 09:00

## 2024-10-19 RX ADMIN — PANCRELIPASE LIPASE, PANCRELIPASE PROTEASE, PANCRELIPASE AMYLASE 20000 UNITS: 20000; 63000; 84000 CAPSULE, DELAYED RELEASE ORAL at 09:00

## 2024-10-19 RX ADMIN — PANCRELIPASE LIPASE, PANCRELIPASE PROTEASE, PANCRELIPASE AMYLASE 20000 UNITS: 20000; 63000; 84000 CAPSULE, DELAYED RELEASE ORAL at 17:12

## 2024-10-19 RX ADMIN — Medication 1 CAPSULE: at 13:50

## 2024-10-19 ASSESSMENT — PAIN DESCRIPTION - DESCRIPTORS
DESCRIPTORS: CRAMPING
DESCRIPTORS: ACHING
DESCRIPTORS: ACHING;DULL;SORE
DESCRIPTORS: ACHING;DULL;SORE
DESCRIPTORS: ACHING
DESCRIPTORS: ACHING;DULL;SORE

## 2024-10-19 ASSESSMENT — PAIN SCALES - GENERAL
PAINLEVEL_OUTOF10: 9
PAINLEVEL_OUTOF10: 8
PAINLEVEL_OUTOF10: 7
PAINLEVEL_OUTOF10: 7
PAINLEVEL_OUTOF10: 9
PAINLEVEL_OUTOF10: 8

## 2024-10-19 ASSESSMENT — PAIN DESCRIPTION - LOCATION
LOCATION: ABDOMEN;LEG
LOCATION: ABDOMEN
LOCATION: ABDOMEN;LEG
LOCATION: ABDOMEN;LEG

## 2024-10-19 ASSESSMENT — PAIN DESCRIPTION - ORIENTATION
ORIENTATION: RIGHT;LEFT

## 2024-10-19 ASSESSMENT — PAIN DESCRIPTION - FREQUENCY: FREQUENCY: CONTINUOUS

## 2024-10-19 NOTE — FLOWSHEET NOTE
10/18/24 2033   Treatment Team Notification   Reason for Communication Critical results   Type of Critical Result Laboratory   Critical Lab Information potassium 6.4   Person Result Received From lab   Critical Lab Result Type Electrolytes   Name of Team Member Notified Norma Soliman NP   Treatment Team Role Advanced Practice Nurse   Method of Communication Secure Message   Response See orders   Notification Time 2033     orders per NP -  tele monitoring 48hr duration, administer insulin regular 10 units IV, dextrose 10% bolus 250ml, POC glucose checks

## 2024-10-19 NOTE — CONSULTS
Nicolas Gao MD  Urology Consultation    Patient:  Nicolas Cardenas  MRN: [id number]  YOB: 1955    CHIEF COMPLAINT: UTI, ureteral stent    HISTORY OF PRESENT ILLNESS:   The patient is a 69 y.o. male who presents with urine tract infection, the patient has a ureteral stent.  The patient presented yesterday with his sister and a caregiver with the plan to return to the skilled nursing facility after cystoscopy and stent exchange  Patient was scheduled for cystoscopy stent exchange yesterday with he was dismissed from the skilled nursing facility after he went to the hospital  Concern was raised about not being able to return to the facility and the patient is on multiple medications that he is receiving  The nursing facility claims that he left AGAINST MEDICAL ADVICE      Conversations yesterday with Annette Reinoso the [facility] and the manager of the skilled nursing facility Angie Solitario had not resolved the issue of with the patient can go after the elective urologic procedure    Because of all the confusion over the patient's home arrangement or skilled nursing after completing the urologic surgery under anesthesia, and since the procedure was only elective, procedure was postponed till next week.    Patient went to the emergency room with complaints of shortness of breath low oxygen saturation    Patient's old records, notes and chart reviewed and summarized above.    Past Medical History:    Past Medical History:   Diagnosis Date    Abdominal pain 5/25/2021    Allergic rhinitis     Cellulitis of left lower extremity at BKA stump 4/3/2021    Cellulitis of right heel     Chronic kidney disease     Chronic refractory osteomyelitis of left foot 1/25/2021    COPD (chronic obstructive pulmonary disease) ([facility])     Depression     Diabetic neuropathy ([facility])     dr. cortez, podiatrist    Dizziness     DM (diabetes mellitus) ([facility])     , endocrinologist    Esophageal cancer ([facility])     4-5 years ago    
  [facility]  Initial Consult Note  Date: 10/17/2024    Hospital day :6     Impression:   Urinary tract infection  Bladder stones status post cystoscopy/ureteral stent exchange/cystolithopexy on 10/13/2024 with reported purulent urine coming down right ureteral stent .  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 Diflucan through 10/23/2024  P.o. vancomycin through 11/2/24.  Received a course of Zithromax and ceftriaxone through 10/15/2024  Follow CBC and renal function    Chief complaint/reason for consultation:   Urinary tract infection    History of Present Illness:   Nicolas Cardenas is a 69 y.o.-year-old male who was initially admitted on 10/11/2024.      Mo has multiple medical issues including diabetes mellitus type 2, hypertension, COPD, nephrolithiasis, CKD, liver disease, pulmonary and esophageal cancer.  Patient has had multiple issues in the past with his lower extremities and subsequently underwent bilateral below the knee amputations.  He has also had chronic diarrheal issues and has been treated for C. difficile in the past as well as yersiniosis.  Previously treated with vancomycin , FMT and Dificid 9/15/2024 through 9/25/2024.    The patient presented for scheduled outpatient cystoscopy with a stent exchange, was noted to have altered mental status, was admitted started on IV antibiotics.  The patient was started on ceftriaxone for suspected UTI  Initial urine culture on 10/11/2024 grew several bacteria  10/11/2024 CT abdomen and pelvis revealed mild hydronephrosis and dilation of renal pelvis, mild diffuse bladder wall thickening and adjacent fat stranding, dependent linear and clustered nodular opacities to 
[facility]   Pharmacy Pharmacokinetic Monitoring Service - Vancomycin     Nicolas Cardenas is a 69 y.o. male starting on vancomycin therapy for UTI. Pharmacy consulted by Dr. Cloud for monitoring and adjustment.    Target Concentration: Goal AUC/DARIAN 400-600 mg*hr/L    Additional Antimicrobials: none    Pertinent Laboratory Values:   Wt Readings from Last 1 Encounters:   10/17/24 55.5 kg (122 lb 6.4 oz)     Temp Readings from Last 1 Encounters:   10/17/24 97.9 °F (36.6 °C) (Oral)     Estimated Creatinine Clearance: 43 mL/min (A) (based on SCr of 1.3 mg/dL (H)).  Recent Labs     10/15/24  0643 10/17/24  0602   CREATININE 1.3* 1.3*   BUN 26* 24*   WBC 15.5* 14.8*       Pertinent Cultures:    MRSA Nasal Swab: N/A. Non-respiratory infection.    Plan:  Dosing recommendations based on Bayesian software  Start vancomycin 1500 mg x1 f/b 1250 mg IV q24h  Anticipated AUC of 556 and trough concentration of 16.6 at steady state  Renal labs as indicated   Vancomycin concentration ordered for 10/19 @ 0600   Pharmacy will continue to monitor patient and adjust therapy as indicated    Thank you for the consult,  VALERIANO WOO RPH  10/17/2024 1:06 PM    
Warfarin Dosing - Pharmacy Consult Note  Consulting Provider: RICHARD Todd  Indication:  History of  VTE/PE  Warfarin Dose prior to admission: 2.5mg daily   Concurrent anticoagulants/antiplatelets: Lovenox 40mg daily  Significant Drug Interactions: No obvious interactions  Recent Labs     10/11/24  1557 10/11/24  1613   INR 1.2  --    HGB  --  9.3*   PLT  --  738*     Recent warfarin administrations        No warfarin orders with administrations found.            Orders not given:            warfarin placeholder: dosing by pharmacy    warfarin (COUMADIN) tablet 7.5 mg                   Date   INR    Dose  10/11     1.2    Assessment/Plan  (Goal INR: 2 - 3)  INR subtherapeutic, pt on Lovenox. Based off of previous admission's warfarin consult, ordered 7.5mg dose for tonight.     Active problem list reviewed.  INR orders are placed.  Chart reviewed for pertinent labs, drug/diet interactions, and past doses.  Documentation of patient's clinical condition was reviewed.    Pharmacy Dosing:  Pharmacy will continue to follow.      
daily  Low potassium diet  BMP every 4 for now  Strict I's and O's    Thank you for the consultation.  Please do not hesitate to call with questions.    Electronically signed by ROSHAN CARO MD on 10/19/2024 at 8:15 AM

## 2024-10-19 NOTE — FLOWSHEET NOTE
10/19/24 0726   Treatment Team Notification   Reason for Communication Critical results   Type of Critical Result Laboratory   Critical Lab Information K 6.2   Name of Team Member Notified JASPER Harrell   Treatment Team Role Attending Provider   Method of Communication Secure Message   Response Waiting for response   Notification Time 0727     JASPER Harrell discussed w/ Dr. Kearns and ordered for a bladder scan to be obtained d/t risk of urinary retention.   Dr. Kearns in room when RN obtained bladder scan result of 246ml PVR.   No dailey placement or straight catheterization ordered at this time.   Dr. Kearns to place orders for hyperkalemia treatment.

## 2024-10-20 LAB
ANION GAP SERPL CALCULATED.3IONS-SCNC: 10 MMOL/L (ref 9–17)
ANION GAP SERPL CALCULATED.3IONS-SCNC: 11 MMOL/L (ref 9–17)
ANION GAP SERPL CALCULATED.3IONS-SCNC: 7 MMOL/L (ref 9–17)
BUN SERPL-MCNC: 31 MG/DL (ref 8–23)
BUN SERPL-MCNC: 33 MG/DL (ref 8–23)
BUN SERPL-MCNC: 34 MG/DL (ref 8–23)
BUN/CREAT SERPL: 19 (ref 9–20)
BUN/CREAT SERPL: 21 (ref 9–20)
BUN/CREAT SERPL: 23 (ref 9–20)
CALCIUM SERPL-MCNC: 8.3 MG/DL (ref 8.6–10.4)
CALCIUM SERPL-MCNC: 8.6 MG/DL (ref 8.6–10.4)
CALCIUM SERPL-MCNC: 8.6 MG/DL (ref 8.6–10.4)
CHLORIDE SERPL-SCNC: 104 MMOL/L (ref 98–107)
CHLORIDE SERPL-SCNC: 110 MMOL/L (ref 98–107)
CHLORIDE SERPL-SCNC: 110 MMOL/L (ref 98–107)
CHLORIDE UR-SCNC: 59 MMOL/L
CO2 SERPL-SCNC: 20 MMOL/L (ref 20–31)
CO2 SERPL-SCNC: 23 MMOL/L (ref 20–31)
CO2 SERPL-SCNC: 25 MMOL/L (ref 20–31)
CREAT SERPL-MCNC: 1.5 MG/DL (ref 0.7–1.2)
CREAT SERPL-MCNC: 1.6 MG/DL (ref 0.7–1.2)
CREAT SERPL-MCNC: 1.6 MG/DL (ref 0.7–1.2)
GFR, ESTIMATED: 47 ML/MIN/1.73M2
GFR, ESTIMATED: 47 ML/MIN/1.73M2
GFR, ESTIMATED: 51 ML/MIN/1.73M2
GLUCOSE BLD-MCNC: 112 MG/DL (ref 75–110)
GLUCOSE BLD-MCNC: 170 MG/DL (ref 75–110)
GLUCOSE BLD-MCNC: 185 MG/DL (ref 75–110)
GLUCOSE BLD-MCNC: 195 MG/DL (ref 75–110)
GLUCOSE SERPL-MCNC: 159 MG/DL (ref 70–99)
GLUCOSE SERPL-MCNC: 172 MG/DL (ref 70–99)
GLUCOSE SERPL-MCNC: 220 MG/DL (ref 70–99)
INR PPP: 1.3
POTASSIUM SERPL-SCNC: 5.1 MMOL/L (ref 3.7–5.3)
POTASSIUM SERPL-SCNC: 5.3 MMOL/L (ref 3.7–5.3)
POTASSIUM SERPL-SCNC: 5.4 MMOL/L (ref 3.7–5.3)
POTASSIUM, UR: 47 MMOL/L
PROTHROMBIN TIME: 16.4 SEC (ref 11.5–14.2)
SODIUM SERPL-SCNC: 135 MMOL/L (ref 135–144)
SODIUM SERPL-SCNC: 142 MMOL/L (ref 135–144)
SODIUM SERPL-SCNC: 143 MMOL/L (ref 135–144)

## 2024-10-20 PROCEDURE — 6370000000 HC RX 637 (ALT 250 FOR IP): Performed by: NURSE PRACTITIONER

## 2024-10-20 PROCEDURE — 82947 ASSAY GLUCOSE BLOOD QUANT: CPT

## 2024-10-20 PROCEDURE — 2580000003 HC RX 258: Performed by: INTERNAL MEDICINE

## 2024-10-20 PROCEDURE — 36415 COLL VENOUS BLD VENIPUNCTURE: CPT

## 2024-10-20 PROCEDURE — 6360000002 HC RX W HCPCS: Performed by: NURSE PRACTITIONER

## 2024-10-20 PROCEDURE — 97530 THERAPEUTIC ACTIVITIES: CPT

## 2024-10-20 PROCEDURE — 6370000000 HC RX 637 (ALT 250 FOR IP): Performed by: STUDENT IN AN ORGANIZED HEALTH CARE EDUCATION/TRAINING PROGRAM

## 2024-10-20 PROCEDURE — 99232 SBSQ HOSP IP/OBS MODERATE 35: CPT | Performed by: NURSE PRACTITIONER

## 2024-10-20 PROCEDURE — 6370000000 HC RX 637 (ALT 250 FOR IP): Performed by: INTERNAL MEDICINE

## 2024-10-20 PROCEDURE — 1200000000 HC SEMI PRIVATE

## 2024-10-20 PROCEDURE — 6370000000 HC RX 637 (ALT 250 FOR IP): Performed by: UROLOGY

## 2024-10-20 PROCEDURE — 85610 PROTHROMBIN TIME: CPT

## 2024-10-20 PROCEDURE — 80048 BASIC METABOLIC PNL TOTAL CA: CPT

## 2024-10-20 PROCEDURE — 6360000002 HC RX W HCPCS: Performed by: INTERNAL MEDICINE

## 2024-10-20 RX ORDER — WARFARIN SODIUM 10 MG/1
10 TABLET ORAL
Status: COMPLETED | OUTPATIENT
Start: 2024-10-20 | End: 2024-10-20

## 2024-10-20 RX ORDER — MORPHINE SULFATE 2 MG/ML
2 INJECTION, SOLUTION INTRAMUSCULAR; INTRAVENOUS ONCE
Status: COMPLETED | OUTPATIENT
Start: 2024-10-20 | End: 2024-10-20

## 2024-10-20 RX ORDER — SODIUM POLYSTYRENE SULFONATE 15 G/60ML
30 SUSPENSION ORAL; RECTAL ONCE
Status: COMPLETED | OUTPATIENT
Start: 2024-10-20 | End: 2024-10-20

## 2024-10-20 RX ADMIN — VANCOMYCIN HYDROCHLORIDE 125 MG: 125 CAPSULE ORAL at 22:47

## 2024-10-20 RX ADMIN — CLOTRIMAZOLE AND BETAMETHASONE DIPROPIONATE: 10; .5 CREAM TOPICAL at 21:07

## 2024-10-20 RX ADMIN — MORPHINE SULFATE 2 MG: 2 INJECTION, SOLUTION INTRAMUSCULAR; INTRAVENOUS at 01:37

## 2024-10-20 RX ADMIN — PANCRELIPASE LIPASE, PANCRELIPASE PROTEASE, PANCRELIPASE AMYLASE 5000 UNITS: 5000; 17000; 24000 CAPSULE, DELAYED RELEASE ORAL at 12:27

## 2024-10-20 RX ADMIN — SODIUM POLYSTYRENE SULFONATE 30 G: 15 SUSPENSION ORAL; RECTAL at 01:40

## 2024-10-20 RX ADMIN — SODIUM BICARBONATE 1300 MG: 650 TABLET ORAL at 21:06

## 2024-10-20 RX ADMIN — PANCRELIPASE LIPASE, PANCRELIPASE PROTEASE, PANCRELIPASE AMYLASE 20000 UNITS: 20000; 63000; 84000 CAPSULE, DELAYED RELEASE ORAL at 17:13

## 2024-10-20 RX ADMIN — Medication 1 CAPSULE: at 17:13

## 2024-10-20 RX ADMIN — SODIUM BICARBONATE 1300 MG: 650 TABLET ORAL at 08:09

## 2024-10-20 RX ADMIN — GUAIFENESIN 600 MG: 600 TABLET ORAL at 08:10

## 2024-10-20 RX ADMIN — DIPHENHYDRAMINE HYDROCHLORIDE 50 MG: 50 INJECTION INTRAMUSCULAR; INTRAVENOUS at 21:07

## 2024-10-20 RX ADMIN — PANCRELIPASE LIPASE, PANCRELIPASE PROTEASE, PANCRELIPASE AMYLASE 20000 UNITS: 20000; 63000; 84000 CAPSULE, DELAYED RELEASE ORAL at 12:27

## 2024-10-20 RX ADMIN — CLOTRIMAZOLE AND BETAMETHASONE DIPROPIONATE: 10; .5 CREAM TOPICAL at 12:27

## 2024-10-20 RX ADMIN — INSULIN LISPRO 1 UNITS: 100 INJECTION, SOLUTION INTRAVENOUS; SUBCUTANEOUS at 22:46

## 2024-10-20 RX ADMIN — SODIUM ZIRCONIUM CYCLOSILICATE 10 G: 10 POWDER, FOR SUSPENSION ORAL at 14:44

## 2024-10-20 RX ADMIN — GUAIFENESIN 600 MG: 600 TABLET ORAL at 21:06

## 2024-10-20 RX ADMIN — QUETIAPINE FUMARATE 50 MG: 25 TABLET ORAL at 21:06

## 2024-10-20 RX ADMIN — TAMSULOSIN HYDROCHLORIDE 0.4 MG: 0.4 CAPSULE ORAL at 08:10

## 2024-10-20 RX ADMIN — MORPHINE SULFATE 2 MG: 2 INJECTION, SOLUTION INTRAMUSCULAR; INTRAVENOUS at 22:46

## 2024-10-20 RX ADMIN — QUETIAPINE FUMARATE 50 MG: 25 TABLET ORAL at 08:10

## 2024-10-20 RX ADMIN — MICONAZOLE NITRATE: 20 CREAM TOPICAL at 12:28

## 2024-10-20 RX ADMIN — FAMOTIDINE 20 MG: 20 TABLET, FILM COATED ORAL at 08:10

## 2024-10-20 RX ADMIN — METOPROLOL TARTRATE 25 MG: 25 TABLET, FILM COATED ORAL at 21:06

## 2024-10-20 RX ADMIN — MICONAZOLE NITRATE: 20 CREAM TOPICAL at 21:07

## 2024-10-20 RX ADMIN — METOPROLOL TARTRATE 25 MG: 25 TABLET, FILM COATED ORAL at 08:10

## 2024-10-20 RX ADMIN — DIPHENHYDRAMINE HYDROCHLORIDE 50 MG: 50 INJECTION INTRAMUSCULAR; INTRAVENOUS at 14:37

## 2024-10-20 RX ADMIN — PANCRELIPASE LIPASE, PANCRELIPASE PROTEASE, PANCRELIPASE AMYLASE 5000 UNITS: 5000; 17000; 24000 CAPSULE, DELAYED RELEASE ORAL at 08:10

## 2024-10-20 RX ADMIN — CHOLESTYRAMINE 4 G: 4 POWDER, FOR SUSPENSION ORAL at 09:39

## 2024-10-20 RX ADMIN — TRAZODONE HYDROCHLORIDE 50 MG: 50 TABLET ORAL at 21:06

## 2024-10-20 RX ADMIN — OXYCODONE AND ACETAMINOPHEN 1 TABLET: 5; 325 TABLET ORAL at 08:10

## 2024-10-20 RX ADMIN — VANCOMYCIN HYDROCHLORIDE 750 MG: 750 INJECTION, POWDER, LYOPHILIZED, FOR SOLUTION INTRAVENOUS at 01:54

## 2024-10-20 RX ADMIN — INSULIN GLARGINE 20 UNITS: 100 INJECTION, SOLUTION SUBCUTANEOUS at 08:09

## 2024-10-20 RX ADMIN — PANCRELIPASE LIPASE, PANCRELIPASE PROTEASE, PANCRELIPASE AMYLASE 5000 UNITS: 5000; 17000; 24000 CAPSULE, DELAYED RELEASE ORAL at 17:13

## 2024-10-20 RX ADMIN — FLUCONAZOLE 200 MG: 100 TABLET ORAL at 08:10

## 2024-10-20 RX ADMIN — DIPHENHYDRAMINE HYDROCHLORIDE 50 MG: 50 INJECTION INTRAMUSCULAR; INTRAVENOUS at 08:14

## 2024-10-20 RX ADMIN — BUSPIRONE HYDROCHLORIDE 5 MG: 5 TABLET ORAL at 21:06

## 2024-10-20 RX ADMIN — TAMSULOSIN HYDROCHLORIDE 0.4 MG: 0.4 CAPSULE ORAL at 21:06

## 2024-10-20 RX ADMIN — PANCRELIPASE LIPASE, PANCRELIPASE PROTEASE, PANCRELIPASE AMYLASE 20000 UNITS: 20000; 63000; 84000 CAPSULE, DELAYED RELEASE ORAL at 08:09

## 2024-10-20 RX ADMIN — DIPHENHYDRAMINE HYDROCHLORIDE 50 MG: 50 INJECTION INTRAMUSCULAR; INTRAVENOUS at 01:48

## 2024-10-20 RX ADMIN — OXYCODONE AND ACETAMINOPHEN 1 TABLET: 5; 325 TABLET ORAL at 14:37

## 2024-10-20 RX ADMIN — OXYCODONE AND ACETAMINOPHEN 1 TABLET: 5; 325 TABLET ORAL at 21:06

## 2024-10-20 RX ADMIN — Medication 1 CAPSULE: at 12:27

## 2024-10-20 RX ADMIN — SODIUM CHLORIDE: 9 INJECTION, SOLUTION INTRAVENOUS at 14:36

## 2024-10-20 RX ADMIN — OXYBUTYNIN CHLORIDE 5 MG: 5 TABLET, EXTENDED RELEASE ORAL at 21:06

## 2024-10-20 RX ADMIN — Medication 1 CAPSULE: at 08:09

## 2024-10-20 RX ADMIN — BUSPIRONE HYDROCHLORIDE 5 MG: 5 TABLET ORAL at 08:10

## 2024-10-20 RX ADMIN — WARFARIN SODIUM 10 MG: 10 TABLET ORAL at 17:13

## 2024-10-20 RX ADMIN — FERROUS SULFATE TAB EC 325 MG (65 MG FE EQUIVALENT) 325 MG: 325 (65 FE) TABLET DELAYED RESPONSE at 08:10

## 2024-10-20 ASSESSMENT — PAIN SCALES - GENERAL
PAINLEVEL_OUTOF10: 8
PAINLEVEL_OUTOF10: 9
PAINLEVEL_OUTOF10: 7
PAINLEVEL_OUTOF10: 8
PAINLEVEL_OUTOF10: 7

## 2024-10-20 ASSESSMENT — PAIN DESCRIPTION - PAIN TYPE
TYPE: ACUTE PAIN
TYPE: ACUTE PAIN

## 2024-10-20 ASSESSMENT — PAIN DESCRIPTION - LOCATION
LOCATION: ABDOMEN
LOCATION: GROIN;SCROTUM
LOCATION: GROIN;SCROTUM
LOCATION: ABDOMEN
LOCATION: ABDOMEN

## 2024-10-20 ASSESSMENT — PAIN DESCRIPTION - ORIENTATION
ORIENTATION: RIGHT;LEFT
ORIENTATION: RIGHT;LEFT

## 2024-10-20 ASSESSMENT — PAIN DESCRIPTION - DESCRIPTORS
DESCRIPTORS: ACHING
DESCRIPTORS: CRAMPING
DESCRIPTORS: ACHING

## 2024-10-20 ASSESSMENT — PAIN DESCRIPTION - ONSET
ONSET: ON-GOING
ONSET: ON-GOING

## 2024-10-20 ASSESSMENT — PAIN - FUNCTIONAL ASSESSMENT
PAIN_FUNCTIONAL_ASSESSMENT: ACTIVITIES ARE NOT PREVENTED

## 2024-10-20 ASSESSMENT — PAIN DESCRIPTION - FREQUENCY
FREQUENCY: CONTINUOUS

## 2024-10-21 VITALS
DIASTOLIC BLOOD PRESSURE: 71 MMHG | WEIGHT: 125.6 LBS | HEIGHT: 73 IN | OXYGEN SATURATION: 95 % | SYSTOLIC BLOOD PRESSURE: 139 MMHG | TEMPERATURE: 98.3 F | RESPIRATION RATE: 16 BRPM | HEART RATE: 91 BPM | BODY MASS INDEX: 16.65 KG/M2

## 2024-10-21 LAB
ANION GAP SERPL CALCULATED.3IONS-SCNC: 7 MMOL/L (ref 9–17)
ANION GAP SERPL CALCULATED.3IONS-SCNC: 8 MMOL/L (ref 9–17)
ANION GAP SERPL CALCULATED.3IONS-SCNC: 9 MMOL/L (ref 9–17)
BUN SERPL-MCNC: 25 MG/DL (ref 8–23)
BUN SERPL-MCNC: 25 MG/DL (ref 8–23)
BUN SERPL-MCNC: 28 MG/DL (ref 8–23)
BUN/CREAT SERPL: 18 (ref 9–20)
BUN/CREAT SERPL: 19 (ref 9–20)
BUN/CREAT SERPL: 19 (ref 9–20)
CALCIUM SERPL-MCNC: 8.1 MG/DL (ref 8.6–10.4)
CALCIUM SERPL-MCNC: 8.2 MG/DL (ref 8.6–10.4)
CALCIUM SERPL-MCNC: 8.6 MG/DL (ref 8.6–10.4)
CHLORIDE SERPL-SCNC: 106 MMOL/L (ref 98–107)
CHLORIDE SERPL-SCNC: 106 MMOL/L (ref 98–107)
CHLORIDE SERPL-SCNC: 108 MMOL/L (ref 98–107)
CO2 SERPL-SCNC: 24 MMOL/L (ref 20–31)
CO2 SERPL-SCNC: 25 MMOL/L (ref 20–31)
CO2 SERPL-SCNC: 26 MMOL/L (ref 20–31)
CREAT SERPL-MCNC: 1.3 MG/DL (ref 0.7–1.2)
CREAT SERPL-MCNC: 1.4 MG/DL (ref 0.7–1.2)
CREAT SERPL-MCNC: 1.5 MG/DL (ref 0.7–1.2)
GFR, ESTIMATED: 51 ML/MIN/1.73M2
GFR, ESTIMATED: 55 ML/MIN/1.73M2
GFR, ESTIMATED: 60 ML/MIN/1.73M2
GLUCOSE BLD-MCNC: 117 MG/DL (ref 75–110)
GLUCOSE BLD-MCNC: 147 MG/DL (ref 75–110)
GLUCOSE BLD-MCNC: 175 MG/DL (ref 75–110)
GLUCOSE BLD-MCNC: 189 MG/DL (ref 75–110)
GLUCOSE SERPL-MCNC: 116 MG/DL (ref 70–99)
GLUCOSE SERPL-MCNC: 169 MG/DL (ref 70–99)
GLUCOSE SERPL-MCNC: 181 MG/DL (ref 70–99)
INR PPP: 2
POTASSIUM SERPL-SCNC: 4.5 MMOL/L (ref 3.7–5.3)
POTASSIUM SERPL-SCNC: 4.7 MMOL/L (ref 3.7–5.3)
POTASSIUM SERPL-SCNC: 4.9 MMOL/L (ref 3.7–5.3)
PROTHROMBIN TIME: 22.9 SEC (ref 11.5–14.2)
SODIUM SERPL-SCNC: 139 MMOL/L (ref 135–144)
SODIUM SERPL-SCNC: 139 MMOL/L (ref 135–144)
SODIUM SERPL-SCNC: 141 MMOL/L (ref 135–144)
VANCOMYCIN SERPL-MCNC: 14.3 UG/ML

## 2024-10-21 PROCEDURE — 80048 BASIC METABOLIC PNL TOTAL CA: CPT

## 2024-10-21 PROCEDURE — 6370000000 HC RX 637 (ALT 250 FOR IP): Performed by: UROLOGY

## 2024-10-21 PROCEDURE — 6370000000 HC RX 637 (ALT 250 FOR IP): Performed by: INTERNAL MEDICINE

## 2024-10-21 PROCEDURE — 99231 SBSQ HOSP IP/OBS SF/LOW 25: CPT | Performed by: INTERNAL MEDICINE

## 2024-10-21 PROCEDURE — 2580000003 HC RX 258: Performed by: UROLOGY

## 2024-10-21 PROCEDURE — 6360000002 HC RX W HCPCS: Performed by: NURSE PRACTITIONER

## 2024-10-21 PROCEDURE — 6370000000 HC RX 637 (ALT 250 FOR IP): Performed by: NURSE PRACTITIONER

## 2024-10-21 PROCEDURE — 6360000002 HC RX W HCPCS: Performed by: INTERNAL MEDICINE

## 2024-10-21 PROCEDURE — 2580000003 HC RX 258: Performed by: INTERNAL MEDICINE

## 2024-10-21 PROCEDURE — 80202 ASSAY OF VANCOMYCIN: CPT

## 2024-10-21 PROCEDURE — 85610 PROTHROMBIN TIME: CPT

## 2024-10-21 PROCEDURE — 82947 ASSAY GLUCOSE BLOOD QUANT: CPT

## 2024-10-21 PROCEDURE — 36415 COLL VENOUS BLD VENIPUNCTURE: CPT

## 2024-10-21 RX ORDER — VANCOMYCIN 1 G/200ML
1000 INJECTION, SOLUTION INTRAVENOUS EVERY 24 HOURS
Status: DISCONTINUED | OUTPATIENT
Start: 2024-10-22 | End: 2024-10-21 | Stop reason: HOSPADM

## 2024-10-21 RX ORDER — FLUCONAZOLE 200 MG/1
200 TABLET ORAL DAILY
DISCHARGE
Start: 2024-10-22 | End: 2024-10-24

## 2024-10-21 RX ORDER — LINEZOLID 600 MG/1
600 TABLET, FILM COATED ORAL 2 TIMES DAILY
Refills: 0 | DISCHARGE
Start: 2024-10-22 | End: 2024-10-25

## 2024-10-21 RX ORDER — MORPHINE SULFATE 2 MG/ML
2 INJECTION, SOLUTION INTRAMUSCULAR; INTRAVENOUS ONCE
Status: COMPLETED | OUTPATIENT
Start: 2024-10-21 | End: 2024-10-21

## 2024-10-21 RX ORDER — FLUCONAZOLE 100 MG/1
200 TABLET ORAL DAILY
Status: DISCONTINUED | OUTPATIENT
Start: 2024-10-22 | End: 2024-10-21 | Stop reason: HOSPADM

## 2024-10-21 RX ORDER — INSULIN GLARGINE 100 [IU]/ML
20 INJECTION, SOLUTION SUBCUTANEOUS DAILY
DISCHARGE
Start: 2024-10-21

## 2024-10-21 RX ORDER — WARFARIN SODIUM 7.5 MG/1
7.5 TABLET ORAL
Status: DISCONTINUED | OUTPATIENT
Start: 2024-10-21 | End: 2024-10-21

## 2024-10-21 RX ADMIN — SODIUM CHLORIDE: 9 INJECTION, SOLUTION INTRAVENOUS at 17:31

## 2024-10-21 RX ADMIN — CHOLESTYRAMINE 4 G: 4 POWDER, FOR SUSPENSION ORAL at 07:45

## 2024-10-21 RX ADMIN — Medication 1 CAPSULE: at 07:44

## 2024-10-21 RX ADMIN — INSULIN LISPRO 1 UNITS: 100 INJECTION, SOLUTION INTRAVENOUS; SUBCUTANEOUS at 16:33

## 2024-10-21 RX ADMIN — PANCRELIPASE LIPASE, PANCRELIPASE PROTEASE, PANCRELIPASE AMYLASE 20000 UNITS: 20000; 63000; 84000 CAPSULE, DELAYED RELEASE ORAL at 12:21

## 2024-10-21 RX ADMIN — SODIUM BICARBONATE 1300 MG: 650 TABLET ORAL at 07:44

## 2024-10-21 RX ADMIN — OXYCODONE AND ACETAMINOPHEN 1 TABLET: 5; 325 TABLET ORAL at 17:33

## 2024-10-21 RX ADMIN — Medication 1 CAPSULE: at 16:33

## 2024-10-21 RX ADMIN — PANCRELIPASE LIPASE, PANCRELIPASE PROTEASE, PANCRELIPASE AMYLASE 20000 UNITS: 20000; 63000; 84000 CAPSULE, DELAYED RELEASE ORAL at 16:33

## 2024-10-21 RX ADMIN — METOPROLOL TARTRATE 25 MG: 25 TABLET, FILM COATED ORAL at 07:48

## 2024-10-21 RX ADMIN — APIXABAN 5 MG: 5 TABLET, FILM COATED ORAL at 16:33

## 2024-10-21 RX ADMIN — GUAIFENESIN 600 MG: 600 TABLET ORAL at 07:44

## 2024-10-21 RX ADMIN — PANCRELIPASE LIPASE, PANCRELIPASE PROTEASE, PANCRELIPASE AMYLASE 20000 UNITS: 20000; 63000; 84000 CAPSULE, DELAYED RELEASE ORAL at 07:44

## 2024-10-21 RX ADMIN — QUETIAPINE FUMARATE 50 MG: 25 TABLET ORAL at 07:44

## 2024-10-21 RX ADMIN — SODIUM CHLORIDE: 9 INJECTION, SOLUTION INTRAVENOUS at 02:51

## 2024-10-21 RX ADMIN — MICONAZOLE NITRATE: 20 CREAM TOPICAL at 07:45

## 2024-10-21 RX ADMIN — VANCOMYCIN HYDROCHLORIDE 750 MG: 750 INJECTION, POWDER, LYOPHILIZED, FOR SOLUTION INTRAVENOUS at 02:54

## 2024-10-21 RX ADMIN — DIPHENHYDRAMINE HYDROCHLORIDE 50 MG: 50 INJECTION INTRAMUSCULAR; INTRAVENOUS at 08:39

## 2024-10-21 RX ADMIN — FERROUS SULFATE TAB EC 325 MG (65 MG FE EQUIVALENT) 325 MG: 325 (65 FE) TABLET DELAYED RESPONSE at 07:44

## 2024-10-21 RX ADMIN — SODIUM CHLORIDE, PRESERVATIVE FREE 10 ML: 5 INJECTION INTRAVENOUS at 07:48

## 2024-10-21 RX ADMIN — PANCRELIPASE LIPASE, PANCRELIPASE PROTEASE, PANCRELIPASE AMYLASE 5000 UNITS: 5000; 17000; 24000 CAPSULE, DELAYED RELEASE ORAL at 12:21

## 2024-10-21 RX ADMIN — OXYCODONE AND ACETAMINOPHEN 1 TABLET: 5; 325 TABLET ORAL at 08:34

## 2024-10-21 RX ADMIN — SODIUM ZIRCONIUM CYCLOSILICATE 10 G: 10 POWDER, FOR SUSPENSION ORAL at 07:44

## 2024-10-21 RX ADMIN — CLOTRIMAZOLE AND BETAMETHASONE DIPROPIONATE: 10; .5 CREAM TOPICAL at 07:46

## 2024-10-21 RX ADMIN — TAMSULOSIN HYDROCHLORIDE 0.4 MG: 0.4 CAPSULE ORAL at 07:45

## 2024-10-21 RX ADMIN — FAMOTIDINE 20 MG: 20 TABLET, FILM COATED ORAL at 07:44

## 2024-10-21 RX ADMIN — Medication 1 CAPSULE: at 12:21

## 2024-10-21 RX ADMIN — INSULIN GLARGINE 20 UNITS: 100 INJECTION, SOLUTION SUBCUTANEOUS at 09:33

## 2024-10-21 RX ADMIN — MORPHINE SULFATE 2 MG: 2 INJECTION, SOLUTION INTRAMUSCULAR; INTRAVENOUS at 19:49

## 2024-10-21 RX ADMIN — PANCRELIPASE LIPASE, PANCRELIPASE PROTEASE, PANCRELIPASE AMYLASE 5000 UNITS: 5000; 17000; 24000 CAPSULE, DELAYED RELEASE ORAL at 07:56

## 2024-10-21 RX ADMIN — FLUCONAZOLE 200 MG: 100 TABLET ORAL at 07:44

## 2024-10-21 RX ADMIN — DIPHENHYDRAMINE HYDROCHLORIDE 50 MG: 50 INJECTION INTRAMUSCULAR; INTRAVENOUS at 16:02

## 2024-10-21 RX ADMIN — PANCRELIPASE LIPASE, PANCRELIPASE PROTEASE, PANCRELIPASE AMYLASE 5000 UNITS: 5000; 17000; 24000 CAPSULE, DELAYED RELEASE ORAL at 16:34

## 2024-10-21 RX ADMIN — BUSPIRONE HYDROCHLORIDE 5 MG: 5 TABLET ORAL at 07:45

## 2024-10-21 ASSESSMENT — PAIN - FUNCTIONAL ASSESSMENT
PAIN_FUNCTIONAL_ASSESSMENT: PREVENTS OR INTERFERES SOME ACTIVE ACTIVITIES AND ADLS
PAIN_FUNCTIONAL_ASSESSMENT: ACTIVITIES ARE NOT PREVENTED
PAIN_FUNCTIONAL_ASSESSMENT: PREVENTS OR INTERFERES SOME ACTIVE ACTIVITIES AND ADLS

## 2024-10-21 ASSESSMENT — PAIN DESCRIPTION - DESCRIPTORS
DESCRIPTORS: ACHING

## 2024-10-21 ASSESSMENT — PAIN SCALES - GENERAL
PAINLEVEL_OUTOF10: 8
PAINLEVEL_OUTOF10: 10

## 2024-10-21 ASSESSMENT — PAIN DESCRIPTION - ORIENTATION: ORIENTATION: LEFT;RIGHT;LOWER

## 2024-10-21 ASSESSMENT — PAIN DESCRIPTION - LOCATION
LOCATION: GENERALIZED
LOCATION: KNEE
LOCATION: GENERALIZED

## 2024-10-21 NOTE — CARE COORDINATION
DC Planning    IMM letter provided to patient.  Patient offered four hours to make informed decision regarding appeal process; patient agreeable to discharge.

## 2024-10-21 NOTE — DISCHARGE SUMMARY
10/18/2024  1. Bibasilar opacities, particular at the right lung base.  Findings may represent bibasilar subsegmental atelectasis.  Infection may appear similar cannot be excluded 2. Large hiatal hernia.       Consultations:    Consults:     Final Specialist Recommendations/Findings:   IP CONSULT TO INTERNAL MEDICINE  IP CONSULT TO UROLOGY  IP CONSULT TO PHARMACY  IP CONSULT TO INFECTIOUS DISEASES  PHARMACY TO DOSE VANCOMYCIN  IP CONSULT TO NEPHROLOGY  IP CONSULT TO CASE MANAGEMENT      The patient was seen and examined on day of discharge and this discharge summary is in conjunction with any daily progress note from day of discharge.    Discharge plan:     Disposition: To a non-[facility] facility    Physician Follow Up:     Rosa Pond, APRN - NP  [address] [facility] [id number] [phone]    Follow up  Hospital follow-up in 7 to 10 days    Nicolas Gao MD  [address] [facility] [id number] [phone]    Follow up in 3 week(s)      Ervin Joy MD  [address] [address]  [facility] [id number] [phone]    Follow up in 1 month(s)  call for appt       Requiring Further Evaluation/Follow Up POST HOSPITALIZATION/Incidental Findings: Patient encouraged to straight cath as needed  Follow-up labs placed on LOREE    Diet: Diabetic/renal diet.  Fluid restriction per nephrology recommendations    Activity: As tolerated    Instructions to Patient:   Follow up with PCP in one week  Take medications as instructed  Call 911 or return to the ER if you experience a recurrence of your symptoms or start having fever, chills, chest pain or shortness of breath     Discharge Medications:      Medication List        START taking these medications      apixaban 5 MG Tabs tablet  Commonly known as: Eliquis  Take 1 tablet by mouth 2 times daily     clotrimazole-betamethasone 1-0.05 % cream  Commonly known as: LOTRISONE  Apply topically 2 times daily.     famotidine 20 MG tablet  Commonly known as: PEPCID  Take 1 tablet by

## 2024-10-21 NOTE — CARE COORDINATION
[facility] will admit patient today. Erasmo Loft[facility] will transport at 6PM. Orders faxed. Nurse to call report to [phone]. Patient is agreeable with dc plans to [facility]. Beto

## 2024-10-21 NOTE — PLAN OF CARE
Problem: Chronic Conditions and Co-morbidities  Goal: Patient's chronic conditions and co-morbidity symptoms are monitored and maintained or improved  10/13/2024 0015 by Roseline Avalos RN  Outcome: Progressing  10/12/2024 1950 by Sharmin Seaman RN  Outcome: Progressing     Problem: Discharge Planning  Goal: Discharge to home or other facility with appropriate resources  10/13/2024 0015 by Roseline Avalos RN  Outcome: Progressing  10/12/2024 1950 by Sharmin Seaman RN  Outcome: Progressing     Problem: Pain  Goal: Verbalizes/displays adequate comfort level or baseline comfort level  10/13/2024 0015 by Roseline Avalos RN  Outcome: Progressing  10/12/2024 1950 by Sharmin Seaman RN  Outcome: Progressing     Problem: Skin/Tissue Integrity  Goal: Absence of new skin breakdown  Description: 1.  Monitor for areas of redness and/or skin breakdown  2.  Assess vascular access sites hourly  3.  Every 4-6 hours minimum:  Change oxygen saturation probe site  4.  Every 4-6 hours:  If on nasal continuous positive airway pressure, respiratory therapy assess nares and determine need for appliance change or resting period.  10/13/2024 0015 by Roseline Avalos RN  Outcome: Progressing  10/12/2024 1950 by Sharmin Seaman RN  Outcome: Progressing     Problem: Safety - Adult  Goal: Free from fall injury  10/13/2024 0015 by Roseline Avalos RN  Outcome: Progressing  10/12/2024 1950 by Sharmin eSaman RN  Outcome: Progressing     Problem: ABCDS Injury Assessment  Goal: Absence of physical injury  10/13/2024 0015 by Roseline Avalos RN  Outcome: Progressing  10/12/2024 1950 by Sharmin Seaman RN  Outcome: Progressing     
  Problem: Chronic Conditions and Co-morbidities  Goal: Patient's chronic conditions and co-morbidity symptoms are monitored and maintained or improved  10/15/2024 1412 by Britney Brannon RN  Outcome: Progressing  Flowsheets (Taken 10/15/2024 0734)  Care Plan - Patient's Chronic Conditions and Co-Morbidity Symptoms are Monitored and Maintained or Improved:   Monitor and assess patient's chronic conditions and comorbid symptoms for stability, deterioration, or improvement   Collaborate with multidisciplinary team to address chronic and comorbid conditions and prevent exacerbation or deterioration   Update acute care plan with appropriate goals if chronic or comorbid symptoms are exacerbated and prevent overall improvement and discharge  10/15/2024 0606 by Sunita Carter RN  Outcome: Progressing  Flowsheets (Taken 10/14/2024 2015)  Care Plan - Patient's Chronic Conditions and Co-Morbidity Symptoms are Monitored and Maintained or Improved: Monitor and assess patient's chronic conditions and comorbid symptoms for stability, deterioration, or improvement     Problem: Discharge Planning  Goal: Discharge to home or other facility with appropriate resources  10/15/2024 1412 by Britney Brannon RN  Outcome: Progressing  Flowsheets (Taken 10/15/2024 0734)  Discharge to home or other facility with appropriate resources:   Identify barriers to discharge with patient and caregiver   Arrange for needed discharge resources and transportation as appropriate   Identify discharge learning needs (meds, wound care, etc)   Arrange for interpreters to assist at discharge as needed   Refer to discharge planning if patient needs post-hospital services based on physician order or complex needs related to functional status, cognitive ability or social support system  10/15/2024 0606 by Sunita Carter RN  Outcome: Progressing  Flowsheets (Taken 10/14/2024 2015)  Discharge to home or other facility with appropriate resources: Identify barriers 
  Problem: Chronic Conditions and Co-morbidities  Goal: Patient's chronic conditions and co-morbidity symptoms are monitored and maintained or improved  10/16/2024 1925 by Sydni Atwood RN  Outcome: Progressing  Flowsheets (Taken 10/16/2024 0750)  Care Plan - Patient's Chronic Conditions and Co-Morbidity Symptoms are Monitored and Maintained or Improved: Monitor and assess patient's chronic conditions and comorbid symptoms for stability, deterioration, or improvement  10/16/2024 0625 by Britney Garcia RN  Outcome: Progressing     Problem: Discharge Planning  Goal: Discharge to home or other facility with appropriate resources  10/16/2024 1925 by Sydni Atwood RN  Outcome: Progressing  Flowsheets (Taken 10/16/2024 0750)  Discharge to home or other facility with appropriate resources:   Identify barriers to discharge with patient and caregiver   Arrange for needed discharge resources and transportation as appropriate   Identify discharge learning needs (meds, wound care, etc)   Refer to discharge planning if patient needs post-hospital services based on physician order or complex needs related to functional status, cognitive ability or social support system  10/16/2024 0625 by Britney Garcia RN  Outcome: Progressing     Problem: Pain  Goal: Verbalizes/displays adequate comfort level or baseline comfort level  10/16/2024 1925 by Sydni Atwood RN  Outcome: Progressing  10/16/2024 0625 by Britney Garcia RN  Outcome: Progressing     Problem: Skin/Tissue Integrity  Goal: Absence of new skin breakdown  Description: 1.  Monitor for areas of redness and/or skin breakdown  2.  Assess vascular access sites hourly  3.  Every 4-6 hours minimum:  Change oxygen saturation probe site  4.  Every 4-6 hours:  If on nasal continuous positive airway pressure, respiratory therapy assess nares and determine need for appliance change or resting period.  10/16/2024 1925 by Sydni Atwood RN  Outcome: Progressing  10/16/2024 
  Problem: Chronic Conditions and Co-morbidities  Goal: Patient's chronic conditions and co-morbidity symptoms are monitored and maintained or improved  10/17/2024 0403 by Kartik Kirkland RN  Outcome: Progressing  Flowsheets (Taken 10/16/2024 1940)  Care Plan - Patient's Chronic Conditions and Co-Morbidity Symptoms are Monitored and Maintained or Improved:   Monitor and assess patient's chronic conditions and comorbid symptoms for stability, deterioration, or improvement   Collaborate with multidisciplinary team to address chronic and comorbid conditions and prevent exacerbation or deterioration   Update acute care plan with appropriate goals if chronic or comorbid symptoms are exacerbated and prevent overall improvement and discharge     Problem: Discharge Planning  Goal: Discharge to home or other facility with appropriate resources  10/17/2024 0403 by Kartik Kirkland RN  Outcome: Progressing  Flowsheets (Taken 10/16/2024 1940)  Discharge to home or other facility with appropriate resources:   Identify barriers to discharge with patient and caregiver   Arrange for needed discharge resources and transportation as appropriate   Identify discharge learning needs (meds, wound care, etc)   Refer to discharge planning if patient needs post-hospital services based on physician order or complex needs related to functional status, cognitive ability or social support system     Problem: Respiratory - Adult  Goal: Achieves optimal ventilation and oxygenation  10/17/2024 0403 by Kartik Kirkland RN  Outcome: Progressing  Flowsheets (Taken 10/16/2024 1940)  Achieves optimal ventilation and oxygenation:   Assess for changes in respiratory status   Assess for changes in mentation and behavior   Encourage broncho-pulmonary hygiene including cough, deep breathe, incentive spirometry   Assess and instruct to report shortness of breath or any respiratory difficulty     Problem: Musculoskeletal - Adult  Goal: Return mobility 
  Problem: Chronic Conditions and Co-morbidities  Goal: Patient's chronic conditions and co-morbidity symptoms are monitored and maintained or improved  10/18/2024 0526 by Kartik Kirkland RN  Outcome: Progressing  Flowsheets (Taken 10/17/2024 2100)  Care Plan - Patient's Chronic Conditions and Co-Morbidity Symptoms are Monitored and Maintained or Improved:   Monitor and assess patient's chronic conditions and comorbid symptoms for stability, deterioration, or improvement   Collaborate with multidisciplinary team to address chronic and comorbid conditions and prevent exacerbation or deterioration   Update acute care plan with appropriate goals if chronic or comorbid symptoms are exacerbated and prevent overall improvement and discharge     Problem: Discharge Planning  Goal: Discharge to home or other facility with appropriate resources  10/18/2024 0526 by Kartik Kirkland RN  Outcome: Progressing  Flowsheets (Taken 10/17/2024 2100)  Discharge to home or other facility with appropriate resources:   Identify barriers to discharge with patient and caregiver   Arrange for needed discharge resources and transportation as appropriate   Identify discharge learning needs (meds, wound care, etc)   Refer to discharge planning if patient needs post-hospital services based on physician order or complex needs related to functional status, cognitive ability or social support system     Problem: Pain  Goal: Verbalizes/displays adequate comfort level or baseline comfort level  10/18/2024 0526 by Kartik Kirkland RN  Outcome: Progressing  Flowsheets (Taken 10/17/2024 2021)  Verbalizes/displays adequate comfort level or baseline comfort level:   Encourage patient to monitor pain and request assistance   Assess pain using appropriate pain scale   Administer analgesics based on type and severity of pain and evaluate response   Implement non-pharmacological measures as appropriate and evaluate response   Consider cultural and 
  Problem: Chronic Conditions and Co-morbidities  Goal: Patient's chronic conditions and co-morbidity symptoms are monitored and maintained or improved  10/19/2024 1454 by Aundrea Trejo RN  Outcome: Progressing     Problem: Discharge Planning  Goal: Discharge to home or other facility with appropriate resources  10/19/2024 1454 by Aundrea Trejo RN  Outcome: Progressing     Problem: Pain  Goal: Verbalizes/displays adequate comfort level or baseline comfort level  10/19/2024 1454 by Aundrea Trejo RN  Outcome: Progressing     Problem: Skin/Tissue Integrity  Goal: Absence of new skin breakdown  Description: 1.  Monitor for areas of redness and/or skin breakdown  2.  Assess vascular access sites hourly  3.  Every 4-6 hours minimum:  Change oxygen saturation probe site  4.  Every 4-6 hours:  If on nasal continuous positive airway pressure, respiratory therapy assess nares and determine need for appliance change or resting period.  10/19/2024 1454 by Aundrea Trejo RN  Outcome: Progressing     Problem: Safety - Adult  Goal: Free from fall injury  10/19/2024 1454 by Aundrea Trejo RN  Outcome: Progressing     Problem: ABCDS Injury Assessment  Goal: Absence of physical injury  10/19/2024 1454 by Aundrea Trejo RN  Outcome: Progressing     Problem: Neurosensory - Adult  Goal: Achieves stable or improved neurological status  10/19/2024 1454 by Aundrea Trejo RN  Outcome: Progressing     Problem: Respiratory - Adult  Goal: Achieves optimal ventilation and oxygenation  10/19/2024 1454 by Aundrea Trejo RN  Outcome: Progressing     Problem: Musculoskeletal - Adult  Goal: Return mobility to safest level of function  10/19/2024 1454 by Aundrea Trejo RN  Outcome: Progressing     Problem: Gastrointestinal - Adult  Goal: Maintains or returns to baseline bowel function  10/19/2024 1454 by Aundrea Trejo RN  Outcome: Progressing     Problem: Genitourinary - Adult  Goal: Urinary catheter remains 
  Problem: Chronic Conditions and Co-morbidities  Goal: Patient's chronic conditions and co-morbidity symptoms are monitored and maintained or improved  Outcome: Progressing     Problem: Discharge Planning  Goal: Discharge to home or other facility with appropriate resources  Outcome: Progressing     Problem: Pain  Goal: Verbalizes/displays adequate comfort level or baseline comfort level  Outcome: Progressing     Problem: Skin/Tissue Integrity  Goal: Absence of new skin breakdown  Description: 1.  Monitor for areas of redness and/or skin breakdown  2.  Assess vascular access sites hourly  3.  Every 4-6 hours minimum:  Change oxygen saturation probe site  4.  Every 4-6 hours:  If on nasal continuous positive airway pressure, respiratory therapy assess nares and determine need for appliance change or resting period.  Outcome: Progressing     Problem: Safety - Adult  Goal: Free from fall injury  Outcome: Progressing     Problem: ABCDS Injury Assessment  Goal: Absence of physical injury  Outcome: Progressing     
  Problem: Chronic Conditions and Co-morbidities  Goal: Patient's chronic conditions and co-morbidity symptoms are monitored and maintained or improved  Outcome: Progressing  Flowsheets  Taken 10/20/2024 1817  Care Plan - Patient's Chronic Conditions and Co-Morbidity Symptoms are Monitored and Maintained or Improved:   Monitor and assess patient's chronic conditions and comorbid symptoms for stability, deterioration, or improvement   Collaborate with multidisciplinary team to address chronic and comorbid conditions and prevent exacerbation or deterioration  Taken 10/20/2024 0810  Care Plan - Patient's Chronic Conditions and Co-Morbidity Symptoms are Monitored and Maintained or Improved:   Monitor and assess patient's chronic conditions and comorbid symptoms for stability, deterioration, or improvement   Collaborate with multidisciplinary team to address chronic and comorbid conditions and prevent exacerbation or deterioration     Problem: Discharge Planning  Goal: Discharge to home or other facility with appropriate resources  Outcome: Progressing  Flowsheets  Taken 10/20/2024 1817  Discharge to home or other facility with appropriate resources:   Identify barriers to discharge with patient and caregiver   Arrange for needed discharge resources and transportation as appropriate   Identify discharge learning needs (meds, wound care, etc)   Arrange for interpreters to assist at discharge as needed  Taken 10/20/2024 0810  Discharge to home or other facility with appropriate resources:   Identify barriers to discharge with patient and caregiver   Arrange for needed discharge resources and transportation as appropriate   Identify discharge learning needs (meds, wound care, etc)   Refer to discharge planning if patient needs post-hospital services based on physician order or complex needs related to functional status, cognitive ability or social support system     Problem: Pain  Goal: Verbalizes/displays adequate comfort 
  Problem: Chronic Conditions and Co-morbidities  Goal: Patient's chronic conditions and co-morbidity symptoms are monitored and maintained or improved  Outcome: Progressing  Flowsheets (Taken 10/12/2024 0348)  Care Plan - Patient's Chronic Conditions and Co-Morbidity Symptoms are Monitored and Maintained or Improved: Monitor and assess patient's chronic conditions and comorbid symptoms for stability, deterioration, or improvement     Problem: Discharge Planning  Goal: Discharge to home or other facility with appropriate resources  Outcome: Progressing  Flowsheets (Taken 10/12/2024 0348)  Discharge to home or other facility with appropriate resources: Identify barriers to discharge with patient and caregiver     Problem: Pain  Goal: Verbalizes/displays adequate comfort level or baseline comfort level  Outcome: Progressing  Flowsheets (Taken 10/12/2024 0348)  Verbalizes/displays adequate comfort level or baseline comfort level:   Encourage patient to monitor pain and request assistance   Assess pain using appropriate pain scale   Administer analgesics based on type and severity of pain and evaluate response   Implement non-pharmacological measures as appropriate and evaluate response     Problem: Skin/Tissue Integrity  Goal: Absence of new skin breakdown  Description: 1.  Monitor for areas of redness and/or skin breakdown  2.  Assess vascular access sites hourly  3.  Every 4-6 hours minimum:  Change oxygen saturation probe site  4.  Every 4-6 hours:  If on nasal continuous positive airway pressure, respiratory therapy assess nares and determine need for appliance change or resting period.  Outcome: Progressing  Note: Skin kept clean and dry, assessed per protocol, barrier cream to coccyx.     Problem: Safety - Adult  Goal: Free from fall injury  Outcome: Progressing  Flowsheets (Taken 10/12/2024 0348)  Free From Fall Injury: Instruct family/caregiver on patient safety     Problem: ABCDS Injury Assessment  Goal: 
  Problem: Pain  Goal: Verbalizes/displays adequate comfort level or baseline comfort level  10/14/2024 0832 by Elissa Su RN  Outcome: Progressing     Problem: Skin/Tissue Integrity  Goal: Absence of new skin breakdown  Description: 1.  Monitor for areas of redness and/or skin breakdown  2.  Assess vascular access sites hourly  3.  Every 4-6 hours minimum:  Change oxygen saturation probe site  4.  Every 4-6 hours:  If on nasal continuous positive airway pressure, respiratory therapy assess nares and determine need for appliance change or resting period.  10/14/2024 0832 by Elissa Su, RN  Outcome: Progressing     Problem: Safety - Adult  Goal: Free from fall injury  10/14/2024 0832 by Elissa Su RN  Outcome: Progressing     
  Problem: Safety - Adult  Goal: Free from fall injury  10/16/2024 0625 by Britney Garcia RN  Outcome: Progressing     Problem: ABCDS Injury Assessment  Goal: Absence of physical injury  10/16/2024 0625 by Britney Garcia RN  Outcome: Progressing     Problem: Respiratory - Adult  Goal: Achieves optimal ventilation and oxygenation  10/16/2024 0625 by Britney Garcia RN  Outcome: Progressing     
Pt consistently asking for IV pain meds throughout the night.  Percocet 1 tab is ordered prn q6hrs. Pt was refusing oral percocet at beginning of shift when asking for pain meds with pain level 9/10. Writer messaged NP, NP responded if pt took oral percocet would then possibly order IV pain meds. Pt agreed to percocet, c/o of same level 9/10 pain later in night. NP ordered one time dose IV morphine 2mg which was given. Pt has been educated through out the night the importance of staying off IV pain meds for discharge, pt has not been compliant to education.   Problem: Chronic Conditions and Co-morbidities  Goal: Patient's chronic conditions and co-morbidity symptoms are monitored and maintained or improved  Outcome: Progressing  Flowsheets (Taken 10/18/2024 2000)  Care Plan - Patient's Chronic Conditions and Co-Morbidity Symptoms are Monitored and Maintained or Improved:   Monitor and assess patient's chronic conditions and comorbid symptoms for stability, deterioration, or improvement   Collaborate with multidisciplinary team to address chronic and comorbid conditions and prevent exacerbation or deterioration   Update acute care plan with appropriate goals if chronic or comorbid symptoms are exacerbated and prevent overall improvement and discharge     Problem: Discharge Planning  Goal: Discharge to home or other facility with appropriate resources  Outcome: Progressing  Flowsheets (Taken 10/18/2024 2000)  Discharge to home or other facility with appropriate resources:   Identify barriers to discharge with patient and caregiver   Arrange for needed discharge resources and transportation as appropriate   Identify discharge learning needs (meds, wound care, etc)   Refer to discharge planning if patient needs post-hospital services based on physician order or complex needs related to functional status, cognitive ability or social support system     Problem: Pain  Goal: Verbalizes/displays adequate comfort level or 
educated on pain scale and control interventions  PRN pain medication given per patient request-Percocet  Patient instructed to call out with new onset of pain or unrelieved pain       Problem: Skin/Tissue Integrity  Goal: Absence of new skin breakdown  Description: 1.  Monitor for areas of redness and/or skin breakdown    Outcome: Progressing     Problem: Safety - Adult  Goal: Free from fall injury  Outcome: Progressing  Flowsheets (Taken 10/13/2024 2102)  Free From Fall Injury:   Instruct family/caregiver on patient safety   Based on caregiver fall risk screen, instruct family/caregiver to ask for assistance with transferring infant if caregiver noted to have fall risk factors     Problem: ABCDS Injury Assessment  Goal: Absence of physical injury  Outcome: Progressing  Flowsheets (Taken 10/13/2024 2102)  Absence of Physical Injury: Implement safety measures based on patient assessment     Problem: Neurosensory - Adult  Goal: Achieves stable or improved neurological status  Outcome: Progressing  Flowsheets (Taken 10/13/2024 1125)  Achieves stable or improved neurological status:   Assess for and report changes in neurological status   Maintain blood pressure and fluid volume within ordered parameters to optimize cerebral perfusion and minimize risk of hemorrhage   Monitor temperature, glucose, and sodium. Initiate appropriate interventions as ordered     Problem: Respiratory - Adult  Goal: Achieves optimal ventilation and oxygenation  Outcome: Progressing  Flowsheets (Taken 10/13/2024 1125)  Achieves optimal ventilation and oxygenation:   Assess for changes in respiratory status   Assess for changes in mentation and behavior   Position to facilitate oxygenation and minimize respiratory effort   Encourage broncho-pulmonary hygiene including cough, deep breathe, incentive spirometry   Assess and instruct to report shortness of breath or any respiratory difficulty     Problem: Musculoskeletal - Adult  Goal: Return 
hours minimum:  Change oxygen saturation probe site  4.  Every 4-6 hours:  If on nasal continuous positive airway pressure, respiratory therapy assess nares and determine need for appliance change or resting period.  10/17/2024 1535 by Merlin Bennett RN  Outcome: Progressing  10/17/2024 0403 by Kartik Kirkland RN  Outcome: Progressing     Problem: Safety - Adult  Goal: Free from fall injury  10/17/2024 1535 by Merlin Bennett RN  Outcome: Progressing  10/17/2024 0403 by Kartik Kirkland RN  Outcome: Progressing     Problem: ABCDS Injury Assessment  Goal: Absence of physical injury  10/17/2024 1535 by Merlin Bennett RN  Outcome: Progressing  10/17/2024 0403 by Kartik Kirkland RN  Outcome: Progressing     Problem: Neurosensory - Adult  Goal: Achieves stable or improved neurological status  10/17/2024 1535 by Merlin Bennett RN  Outcome: Progressing  Flowsheets (Taken 10/17/2024 0800)  Achieves stable or improved neurological status: Assess for and report changes in neurological status  10/17/2024 0403 by Kartik Kirkland RN  Outcome: Progressing  Flowsheets (Taken 10/16/2024 1940)  Achieves stable or improved neurological status: Assess for and report changes in neurological status     Problem: Respiratory - Adult  Goal: Achieves optimal ventilation and oxygenation  10/17/2024 1535 by Merlin Bennett RN  Outcome: Progressing  Flowsheets (Taken 10/17/2024 0800)  Achieves optimal ventilation and oxygenation: Assess for changes in respiratory status  10/17/2024 0403 by Kartik Kirkland RN  Outcome: Progressing  Flowsheets (Taken 10/16/2024 1940)  Achieves optimal ventilation and oxygenation:   Assess for changes in respiratory status   Assess for changes in mentation and behavior   Encourage broncho-pulmonary hygiene including cough, deep breathe, incentive spirometry   Assess and instruct to report shortness of breath or any respiratory difficulty     Problem: Musculoskeletal - 
of infection at discharge:   Assess and monitor for signs and symptoms of infection   Monitor lab/diagnostic results     Problem: Infection - Adult  Goal: Absence of infection during hospitalization  Outcome: Progressing  Flowsheets (Taken 10/14/2024 2015)  Absence of infection during hospitalization: Assess and monitor for signs and symptoms of infection     Problem: Skin/Tissue Integrity - Adult  Goal: Skin integrity remains intact  Outcome: Progressing  Flowsheets (Taken 10/14/2024 2015)  Skin Integrity Remains Intact: Monitor for areas of redness and/or skin breakdown     Problem: Anxiety  Goal: Will report anxiety at manageable levels  Description: INTERVENTIONS:  1. Administer medication as ordered  2. Teach and rehearse alternative coping skills  3. Provide emotional support with 1:1 interaction with staff  Outcome: Progressing     
rehearse alternative coping skills  3. Provide emotional support with 1:1 interaction with staff  Outcome: Progressing     Problem: Spiritual Care  Goal: Pt/Family able to move forward in process of forgiving self, others, and/or higher power  Description: INTERVENTIONS:  1. Assist patient/family to overcome blocks to healing by use of spiritual practices (prayer, meditation, guided imagery, reiki, breath work, etc).  2. De-myth guilt and help patient/family identify possible irrational spiritual/cultural beliefs and values.  3. Explore possibilities of making amends & reconciliation with self, others, and/or a greater power.  4. Guide patient/family in identifying painful feelings of guilt.  5. Help patient/famiy explore and identify spiritual beliefs, cultural understandings or values that may help or hinder letting go of issue.  6. Help patient/family explore feelings of anger, bitterness, resentment.  7. Help patient/family identify and examine the situation in which these feelings are experienced.  8. Help patient/family identify destructive displacement of feelings onto other individuals.  9. Invite use of sacraments/rituals/ceremonies as appropriate (e.g. - confession, anointing, smudging).  10. Refer patient/family to formal counseling and/or to [facility] for further support work.  Outcome: Progressing     Problem: Nutrition Deficit:  Goal: Optimize nutritional status  Outcome: Progressing     Problem: Metabolic/Fluid and Electrolytes - Adult  Goal: Electrolytes maintained within normal limits  Outcome: Progressing     
electrolyte replacement as ordered   Monitor response to electrolyte replacements, including repeat lab results as appropriate     Problem: Metabolic/Fluid and Electrolytes - Adult  Goal: Glucose maintained within prescribed range  10/21/2024 0523 by Kartik Kirkland, RN  Outcome: Progressing  Flowsheets (Taken 10/20/2024 1930)  Glucose maintained within prescribed range:   Monitor blood glucose as ordered   Assess for signs and symptoms of hyperglycemia and hypoglycemia   Administer ordered medications to maintain glucose within target range     
sacraments/rituals/ceremonies as appropriate (e.g. - confession, anointing, smudging).  10. Refer patient/family to formal counseling and/or to [facility] community for further support work.  10/18/2024 1200 by Whit Bahena, RN  Outcome: Progressing    
Identify and instruct in appropriate isolation precautions for identified infection/condition   Instruct and encourage patient and family to use good hand hygiene technique  Goal: Absence of infection during hospitalization  10/13/2024 2259 by Roseline Avalos RN  Outcome: Progressing  10/13/2024 2102 by Ginger Alonzo RN  Outcome: Progressing  Flowsheets (Taken 10/13/2024 1125)  Absence of infection during hospitalization:   Assess and monitor for signs and symptoms of infection   Monitor lab/diagnostic results   Monitor all insertion sites i.e., indwelling lines, tubes and drains   Administer medications as ordered   Instruct and encourage patient and family to use good hand hygiene technique   Identify and instruct in appropriate isolation precautions for identified infection/condition     Problem: Skin/Tissue Integrity - Adult  Goal: Skin integrity remains intact  10/13/2024 2259 by Roseline Avalos RN  Outcome: Progressing  10/13/2024 2102 by Ginger Alonzo RN  Outcome: Progressing  Flowsheets (Taken 10/13/2024 0806)  Skin Integrity Remains Intact: Monitor for areas of redness and/or skin breakdown     Problem: Anxiety  Goal: Will report anxiety at manageable levels  Description: INTERVENTIONS:  1. Administer medication as ordered  2. Teach and rehearse alternative coping skills  3. Provide emotional support with 1:1 interaction with staff  10/13/2024 2259 by Roseline Avalos RN  Outcome: Progressing  10/13/2024 2102 by Ginger Alonzo RN  Outcome: Progressing  Flowsheets (Taken 10/13/2024 1125)  Will report anxiety at manageable levels:   Administer medication as ordered   Teach and rehearse alternative coping skills   Provide emotional support with 1:1 interaction with staff

## 2024-10-21 NOTE — PROGRESS NOTES
Nicolas Gao MD   Urology Progress Note            Subjective:   follow-up  renal lithiasis, chronic retention    Patient Vitals for the past 24 hrs:   BP Temp Temp src Pulse Resp SpO2 Weight   10/21/24 0311 -- -- -- -- -- -- 57 kg (125 lb 9.6 oz)   10/20/24 2316 -- -- -- -- 16 -- --   10/20/24 2136 -- -- -- -- 16 -- --   10/20/24 1923 (!) 146/59 98.4 °F (36.9 °C) Oral 93 16 95 % --   10/20/24 1527 (!) 158/68 98.2 °F (36.8 °C) Oral 93 18 92 % --   10/20/24 1220 (!) 161/72 97.3 °F (36.3 °C) Axillary 88 18 100 % --   10/20/24 0705 131/60 98.8 °F (37.1 °C) Oral 86 16 93 % --       Intake/Output Summary (Last 24 hours) at 10/21/2024 0644  Last data filed at 10/21/2024 0634  Gross per 24 hour   Intake 3206.13 ml   Output 450 ml   Net 2756.13 ml       Recent Labs     10/19/24  0644   WBC 14.1*   HGB 9.0*   HCT 29.1*   MCV 89.0   *     Recent Labs     10/20/24  1425 10/20/24  2033 10/21/24  0242    142 141   K 5.1 5.4* 4.9   * 110* 108*   CO2 23 25 26   BUN 33* 31* 28*   CREATININE 1.6* 1.6* 1.5*       No results for input(s): \"COLORU\", \"PHUR\", \"LABCAST\", \"WBCUA\", \"RBCUA\", \"MUCUS\", \"TRICHOMONAS\", \"YEAST\", \"BACTERIA\", \"CLARITYU\", \"SPECGRAV\", \"LEUKOCYTESUR\", \"UROBILINOGEN\", \"BILIRUBINUR\", \"BLOODU\" in the last 72 hours.    Invalid input(s): \"NITRATE\", \"GLUCOSEUKETONESUAMORPHOUS\"    Additional Lab/culture results:    Physical Exam:  patient is seen in conjunction with nursing staff, sitting in bed not in acute distress, we have again explained to the patient the chronic right urinary retention and neurogenic bladder contributing to recurrent infections and the need for intermittent self catheterization the patient stated he will think about, he voiced understanding    Interval Imaging Findings:    Impression:    Patient Active Problem List   Diagnosis    Type 2 diabetes mellitus with diabetic polyneuropathy, with long-term current use of insulin (HCC)    Neuropathic pain, leg 
                                Nicolas Gao MD   Urology Progress Note            Subjective: Follow-up renal lithiasis neurogenic bladder    Patient Vitals for the past 24 hrs:   BP Temp Temp src Pulse Resp SpO2 Weight   10/18/24 0838 -- -- -- -- 16 -- --   10/18/24 0821 (!) 144/65 97.3 °F (36.3 °C) Axillary 76 18 -- --   10/18/24 0334 -- -- -- -- -- -- 59.9 kg (132 lb 1.6 oz)   10/18/24 0105 -- -- -- -- 18 -- --   10/17/24 2051 -- -- -- -- 18 -- --   10/17/24 2022 (!) 148/62 97.3 °F (36.3 °C) Oral 91 18 94 % --   10/17/24 1216 -- -- -- -- 18 -- --   10/17/24 1122 134/80 97.9 °F (36.6 °C) Oral 79 16 95 % --       Intake/Output Summary (Last 24 hours) at 10/18/2024 0852  Last data filed at 10/18/2024 0738  Gross per 24 hour   Intake --   Output 1220 ml   Net -1220 ml       Recent Labs     10/17/24  0602 10/18/24  0607   WBC 14.8* 17.2*   HGB 9.3* 8.8*   HCT 32.3* 29.7*   MCV 93.9 92.5   * 639*     Recent Labs     10/17/24  0602 10/18/24  0607    138   K 5.1 6.2*    106   CO2 23 22   BUN 24* 32*   CREATININE 1.3* 1.5*       No results for input(s): \"COLORU\", \"PHUR\", \"LABCAST\", \"WBCUA\", \"RBCUA\", \"MUCUS\", \"TRICHOMONAS\", \"YEAST\", \"BACTERIA\", \"CLARITYU\", \"SPECGRAV\", \"LEUKOCYTESUR\", \"UROBILINOGEN\", \"BILIRUBINUR\", \"BLOODU\" in the last 72 hours.    Invalid input(s): \"NITRATE\", \"GLUCOSEUKETONESUAMORPHOUS\"    Additional Lab/culture results:    Physical Exam: Quesada catheter was removed, yesterday, patient has been voiding, he carries a high postvoid residual, the patient was advised of the importance of intermittent self-catheterization,    Interval Imaging Findings:    Impression:    Patient Active Problem List   Diagnosis    Type 2 diabetes mellitus with diabetic polyneuropathy, with long-term current use of insulin (HCC)    Neuropathic pain, leg    Diabetic polyneuropathy (HCC)    Allergic rhinitis    Tobacco dependence    Edentulous    Dysphagia    Lung nodules    Esophageal cancer (HCC)    Fracture of 
                                Nicolas Gao MD   Urology Progress Note            Subjective: Follow-up urinary retention hydronephrosis    Patient Vitals for the past 24 hrs:   BP Temp Temp src Pulse Resp SpO2 Weight   10/16/24 0727 134/73 97.7 °F (36.5 °C) Oral 88 16 92 % --   10/16/24 0453 -- -- -- -- -- -- 57.4 kg (126 lb 9.6 oz)   10/15/24 2052 (!) 169/82 98.4 °F (36.9 °C) Oral 92 18 96 % --   10/15/24 1611 (!) 141/72 98.1 °F (36.7 °C) Oral 63 17 99 % --   10/15/24 1127 (!) 145/56 97.6 °F (36.4 °C) Oral 63 17 98 % --       Intake/Output Summary (Last 24 hours) at 10/16/2024 0757  Last data filed at 10/16/2024 0529  Gross per 24 hour   Intake --   Output 1350 ml   Net -1350 ml       Recent Labs     10/14/24  0518 10/15/24  0643   WBC 17.1* 15.5*   HGB 8.6* 9.1*   HCT 28.6* 30.6*   MCV 92.6 91.6   * 639*     Recent Labs     10/14/24  0518 10/14/24  1407 10/15/24  0643     --  141   K 5.7* 4.2 5.1     --  105   CO2 27  --  25   BUN 27*  --  26*   CREATININE 1.4*  --  1.3*       Recent Labs     10/13/24  1012   COLORU Yellow   PHUR 6.0   WBCUA 50    RBCUA 10 TO 20   LEUKOCYTESUR LARGE*   UROBILINOGEN Normal   BILIRUBINUR NEGATIVE       Additional Lab/culture results:    Physical Exam: Patient has an indwelling Quesada, ureteral stent has been exchanged, catheter removal and stent removal today    Interval Imaging Findings:    Impression:    Patient Active Problem List   Diagnosis    Type 2 diabetes mellitus with diabetic polyneuropathy, with long-term current use of insulin (HCC)    Neuropathic pain, leg    Diabetic polyneuropathy (HCC)    Allergic rhinitis    Tobacco dependence    Edentulous    Dysphagia    Lung nodules    Esophageal cancer (HCC)    Fracture of humerus, left, closed    Closed fracture of humerus    DM type 2 with diabetic peripheral neuropathy (HCC)    COPD without exacerbation (HCC)    HLD (hyperlipidemia)    Gastroesophageal reflux disease without esophagitis    Chronic 
Argues about medications simethicone given for gas-prefers gas x; denies having stool tonight despite mult bedpan use with liq.brown liq results- verbalizes was pee; thinks maybe he needs a suppository; wants \"gas out\"; wants Dr called about NPO says procedure was all set for tues.; upset that he can not have coffee at present  
Called lab regarding potassium lab draw ordered @ 1700. Lab will contact phlebotomist to have in drawn ASAP.  
Comprehensive Nutrition Assessment    Type and Reason for Visit:  Reassess    Nutrition Recommendations/Plan:   Continue ONS BID and current diet  Encourage good po intakes  Weight trending up, RD to continue to follow/monitor.      Malnutrition Assessment:  Malnutrition Status:  Severe malnutrition (10/15/24 1241)    Context:  Chronic Illness     Findings of the 6 clinical characteristics of malnutrition:  Energy Intake:  No significant decrease in energy intake  Weight Loss:  Greater than 5% over 1 month     Body Fat Loss:  Severe body fat loss Triceps, Orbital   Muscle Mass Loss:  Severe muscle mass loss Temples (temporalis), Clavicles (pectoralis & deltoids), Hand (interosseous)  Fluid Accumulation:  No significant fluid accumulation     Strength:  Not Performed    Nutrition Assessment:    Patient on CHO4 diet with ONS BID. He is doing well with diet, weight is up. No nutrition issues at this time. Labs, meds, PMH reviewed.    Nutrition Related Findings:    Patient had stage 1 buttocks on previous admit, is not yet charted; LBM 10/14, hypoactive bs. No edema noted. 8% body weight loss in 1 month, significant.         Current Nutrition Intake & Therapies:    Average Meal Intake: Unable to assess     ADULT ORAL NUTRITION SUPPLEMENT; Breakfast, Dinner; Diabetic Oral Supplement  ADULT DIET; Regular; 4 carb choices (60 gm/meal)    Anthropometric Measures:  Height: 185.4 cm (6' 1\")  Ideal Body Weight (IBW): 184 lbs (84 kg)       Current Body Weight: 59.9 kg (132 lb),   IBW.    Current BMI (kg/m2): 17.4                          BMI Categories: Underweight (BMI less than 22) age over 65    Estimated Daily Nutrient Needs:  Energy Requirements Based On: Kcal/kg     Energy (kcal/day): 5040-6264 (30-35)  Weight Used for Protein Requirements: Current  Protein (g/day):  (1.5-2)  Method Used for Fluid Requirements: 1 ml/kcal  Fluid (ml/day): 3581-8119 (30-35)    Nutrition Diagnosis:   Severe malnutrition related to 
[facility]   Pharmacy Pharmacokinetic Monitoring Service - Vancomycin    Consulting Provider: David Cloud MD   Indication: UTI  Target Concentration: Goal AUC/DARIAN 400-600 mg*hr/L  Day of Therapy: 4  Additional Antimicrobials: fluconazole for UTI, PO vancomycin for C. Diff     Pertinent Laboratory Values:   Wt Readings from Last 1 Encounters:   10/20/24 59.6 kg (131 lb 6.4 oz)     Temp Readings from Last 1 Encounters:   10/20/24 98.8 °F (37.1 °C) (Oral)     Estimated Creatinine Clearance: 40 mL/min (A) (based on SCr of 1.5 mg/dL (H)).  Recent Labs     10/18/24  0607 10/18/24  1844 10/19/24  0644 10/19/24  1447 10/19/24  2022 10/20/24  0312   CREATININE 1.5*   < > 1.5*   < > 1.7* 1.5*   BUN 32*   < > 30*   < > 37* 34*   WBC 17.2*  --  14.1*  --   --   --     < > = values in this interval not displayed.         Pertinent Cultures:  Culture Date Source Results   10/13 urine MRSA 10-50K  Candida albicans 10-50 K   MRSA Nasal Swab: N/A. Non-respiratory infection.    Recent vancomycin administrations                     vancomycin (VANCOCIN) 750 mg in sodium chloride 0.9 % 250 mL IVPB (mg) 750 mg New Bag 10/20/24 0154    vancomycin (VANCOCIN) 1250 mg in sodium chloride 0.9% 250 mL IVPB (mg) 1,250 mg New Bag 10/18/24 1454    vancomycin (VANCOCIN) 1500 mg in 300 mL IVPB (mg) 1,500 mg New Bag 10/17/24 1348                      Plan:  Current dosing regimen is  Vancomycin 750mg IV q 24 hours, dose adjusted yesterday.   Continue current dose  Repeat vancomycin concentration ordered for 10/21 @ 0600   Pharmacy will continue to monitor patient and adjust therapy as indicated    Thank you for the consult,  Maria Esther Victor FACUNDO  10/20/2024 12:00 PM    
[facility]   Pharmacy Pharmacokinetic Monitoring Service - Vancomycin    Consulting Provider: David Cloud MD   Indication: UTI  Target Concentration: Goal AUC/DARIAN 400-600 mg*hr/L  Day of Therapy: 5  Additional Antimicrobials: fluconazole, vancomycin PO    Pertinent Laboratory Values:   Wt Readings from Last 1 Encounters:   10/21/24 57 kg (125 lb 9.6 oz)     Temp Readings from Last 1 Encounters:   10/21/24 98.4 °F (36.9 °C) (Oral)     Estimated Creatinine Clearance: 41 mL/min (A) (based on SCr of 1.4 mg/dL (H)).  Recent Labs     10/19/24  0644 10/19/24  1447 10/21/24  0732 10/21/24  1400   CREATININE 1.5*   < > 1.3* 1.4*   BUN 30*   < > 25* 25*   WBC 14.1*  --   --   --     < > = values in this interval not displayed.     Procalcitonin: 0.14 (9/13/24)    Pertinent Cultures:      MRSA Nasal Swab: N/A. Non-respiratory infection.    Recent vancomycin administrations                     vancomycin (VANCOCIN) 750 mg in sodium chloride 0.9 % 250 mL IVPB (mg) 750 mg New Bag 10/21/24 0254     750 mg New Bag 10/20/24 0154    vancomycin (VANCOCIN) 1250 mg in sodium chloride 0.9% 250 mL IVPB (mg) 1,250 mg New Bag 10/18/24 1454        Assessment:  Date/Time Current Dose Concentration Timing of Concentration (h) AUC   10/21/24 @ 1400 750mg Q24H 14.3 11 376   Note: Serum concentrations collected for AUC dosing may appear elevated if collected in close proximity to the dose administered, this is not necessarily an indication of toxicity    Plan:  Current dosing regimen is sub-therapeutic  Increase dose to 1000mg Q24H  (anticipated  at steady state)  Will order vancomycin concentration in a couple of days if patient remains on vancomycin  Pharmacy will continue to monitor patient and adjust therapy as indicated    Thank you for the consult,  Ann Caballero [facility]  10/21/2024 2:40 PM   
[facility]   Pharmacy Pharmacokinetic Monitoring Service - Vancomycin    Consulting Provider: David Cloud MD   Indication: UTI  Target Concentration: Goal AUC/DARIAN 400-600 mg*hr/L  Day of Therapy: 2  Additional Antimicrobials: fluconazole; vancomycin PO     Pertinent Laboratory Values:   Wt Readings from Last 1 Encounters:   10/18/24 59.9 kg (132 lb 1.6 oz)     Temp Readings from Last 1 Encounters:   10/18/24 97.3 °F (36.3 °C) (Axillary)     Estimated Creatinine Clearance: 40 mL/min (A) (based on SCr of 1.5 mg/dL (H)).  Recent Labs     10/17/24  0602 10/18/24  0607   CREATININE 1.3* 1.5*   BUN 24* 32*   WBC 14.8* 17.2*     Procalcitonin: 0.14 (9/13/24)    Pertinent Cultures:      MRSA Nasal Swab: N/A. Non-respiratory infection.    Recent vancomycin administrations                     vancomycin (VANCOCIN) 1500 mg in 300 mL IVPB (mg) 1,500 mg New Bag 10/17/24 1348          Plan:  Continue current vancomycin regimen 1250mg Q24H  Vancomycin concentration ordered for 10/19/24 @ 0600   Pharmacy will continue to monitor patient and adjust therapy as indicated    Thank you for the consult,  Ann Caballero [facility]  10/18/2024 9:50 AM   
[facility]   Pharmacy Pharmacokinetic Monitoring Service - Vancomycin    Consulting Provider: David Cloud MD   Indication: UTI  Target Concentration: Goal AUC/DARIAN 400-600 mg*hr/L  Day of Therapy: 3  Additional Antimicrobials: fluconazole, vancomycin PO    Pertinent Laboratory Values:   Wt Readings from Last 1 Encounters:   10/18/24 59.9 kg (132 lb 1.6 oz)     Temp Readings from Last 1 Encounters:   10/18/24 97.5 °F (36.4 °C)     Estimated Creatinine Clearance: 40 mL/min (A) (based on SCr of 1.5 mg/dL (H)).  Recent Labs     10/18/24  0607 10/18/24  1844 10/19/24  0644   CREATININE 1.5* 1.4* 1.5*   BUN 32* 37* 30*   WBC 17.2*  --  14.1*       Pertinent Cultures:  Culture Date Source Results   10/13 urine MRSA 10-50 K   Candida albicans 10-50 K       Recent vancomycin administrations                     vancomycin (VANCOCIN) 1250 mg in sodium chloride 0.9% 250 mL IVPB (mg) 1,250 mg New Bag 10/18/24 1454    vancomycin (VANCOCIN) 1500 mg in 300 mL IVPB (mg) 1,500 mg New Bag 10/17/24 1348                    Assessment:  Date/Time Current Dose Concentration Timing of Concentration (h) AUC   10/19 1250mg IV q 24 hrs 19.6 ug/ml 15 hr 50 min 664 mg/L.hr   Note: Serum concentrations collected for AUC dosing may appear elevated if collected in close proximity to the dose administered, this is not necessarily an indication of toxicity    Plan:  Current dosing regimen is supra-therapeutic  \"Decrease dose to Vancomycin 750mg IV q 24 hours , start 10/20 @ 0200 to allow trough to fall into goal range.   Repeat vancomycin concentration ordered for 10/21 @ 0600   Pharmacy will continue to monitor patient and adjust therapy as indicated    Thank you for the consult,  Maria Esther Victor RPH  10/19/2024 7:26 AM    
Dailey still showing blood in the urine. Pain is at 6/10 after IV dilaudid. Patient had multiple watery bowel movements during the shift. Good output in dailey and tolerating diet well. Scrotal and buttocks area red and tender to touch. Will continue to monitor the patient.  
Lab at bedside to draw potassium stated when they came to draw @ 1700 Patient was on  bedpan. Attending notified.  
Nephrology Progress Note        Subjective: The patient is feeling better, he denies pain, no shortness of breath, no nausea, vomiting or diarrhea, blood pressure fluctuates, afebrile, weight is up since admission, good urine output    Objective:  CURRENT TEMPERATURE:  Temp: 98.8 °F (37.1 °C)  MAXIMUM TEMPERATURE OVER 24HRS:  Temp (24hrs), Av.5 °F (36.9 °C), Min:98.2 °F (36.8 °C), Max:98.8 °F (37.1 °C)    CURRENT RESPIRATORY RATE:  Respirations: 16  CURRENT PULSE:  Pulse: 87  CURRENT BLOOD PRESSURE:  BP: 97/64  24HR BLOOD PRESSURE RANGE:  Systolic (24hrs), Av , Min:97 , Max:153   ; Diastolic (24hrs), Av, Min:58, Max:87    24HR INTAKE/OUTPUT:    Intake/Output Summary (Last 24 hours) at 10/20/2024 0620  Last data filed at 10/20/2024 0533  Gross per 24 hour   Intake 501.91 ml   Output 1275 ml   Net -773.09 ml     Weight   Wt Readings from Last 3 Encounters:   10/20/24 59.6 kg (131 lb 6.4 oz)   10/11/24 64.4 kg (142 lb)   24 64 kg (141 lb)       Physical Exam:  Awake, alert, in no acute distress  Skin: warm and dry, no rash or erythema  Eyes: conjunctivae normal and sclera anicteric  Pulmonary: clear to auscultation bilaterally- no wheezes, rales or rhonchi, normal air movement, no respiratory distress  Cardiovascular: Normal S1 & S2  Abdomen: soft nontender, bowel sounds present, no organomegaly,  no ascites  Extremities: 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 
Occupational Therapy  DATE: 10/21/2024    NAME: Nicolas Cardenas  MRN: [id number]   : 1955    Patient not seen this date for Occupational Therapy due to:      [] Cancel by RN or physician due to:    [] Hemodialysis    [] Critical Lab Value Level     [] Blood transfusion in progress    [] Acute or unstable cardiovascular status   _MAP < 55 or more than >115  _HR < 40 or > 130    [] Acute or unstable pulmonary status   -FiO2 > 60%   _RR < 5 or >40    _O2 sats < 85%    [] Strict Bedrest    [] Off Unit for surgery or procedure    [] Off Unit for testing       [] Pending imaging to R/O fracture    [x] Refusal by Patient : Pt. Declined treatment stating \"Not today.....Hell no not today\". OT will cont to follow.     [] Other      [] OT being discontinued at this time. Patient independent. No further needs.     [] OT being discontinued at this time as the patient has been transferred to hospice care. No further needs.      ROBERT MAY PJ   
Occupational Therapy  [facility]  Occupational Therapy Not Seen    DATE: 10/17/2024    NAME: Nicolas Cardenas  MRN: [id number]   : 1955    Patient not seen this date for Occupational Therapy due to:      [] Cancel by RN or physician due to:    [] Hemodialysis    [] Critical Lab Value Level     [] Blood transfusion in progress    [] Acute or unstable cardiovascular status   _MAP < 55 or more than >115  _HR < 40 or > 130    [] Acute or unstable pulmonary status   -FiO2 > 60%   _RR < 5 or >40    _O2 sats < 85%    [] Strict Bedrest    [] Off Unit for surgery or procedure    [] Off Unit for testing       [] Pending imaging to R/O fracture    [x] Refusal by Patient (Pt refused OT at this time reporting he does not feel well and is in pain from having dailey removed but reporting he wants to participate tomorrow).      [] Other      [] OT being discontinued at this time. Patient independent. No further needs.     [] OT being discontinued at this time as the patient has been transferred to hospice care. No further needs.      JP ALEJANDRO   
Occupational Therapy  Facility/Department: [facility] MED SURG  Occupational Therapy Initial Assessment    Name: Nicolas Cardenas  : 1955  MRN: [id number]  Date of Service: 10/15/2024    RN reports patient is medically stable for therapy treatment this date.  Chart reviewed prior to treatment and patient is agreeable for therapy.  All lines intact and patient positioned comfortably at end of treatment.  All patient needs addressed prior to ending therapy session.       Pt currently functioning below baseline.  Recommend daily inpatient skilled therapy at time of discharge to maximize long term outcomes and prevent re-admission. Please refer to AM-PAC score for current level of function.       Discharge Recommendations:  Patient would benefit from continued therapy after discharge  OT Equipment Recommendations  Equipment Needed:  (CTA)       Per H&P: Nicolas Cardenas is a 69 y.o. Non- / non  male who presents with Altered Mental Status and Urology refused to do procedure due to pt's condition and is admitted to the hospital for the management of Community acquired pneumonia of right lower lobe of lung.     Patient presents emergency department with complaints of altered mental status.  He was scheduled to have an outpatient cystoscopy with stent exchange with urology services when the patient was noted to have altered mentation.  There were also concerns regarding placement as there were reports that the patient leaving for his scheduled procedure today was considered AMA.  He was recently discharged from our facility after an extended stay where he was found to have C. difficile, urinary retention and COPD exacerbation.  He underwent a cystoscopy with ureteral stenting at that time.     Upon arrival to the ER his blood pressure was 103/62 heart rate 80 and a temperature 36.2 degrees.  The ER reported that the patient was hypoxic at 85% requiring 3 L nasal cannula.  Blood work in the ER returned 
Occupational Therapy  [facility]  Occupational Therapy Not Seen Note    Patient not available for Occupational Therapy due to:    [] Testing:    [] Hemodialysis    [] Cancelled by RN:    [x]Refusal by Patient:Pt refusing OT tx and even completing self care tasks; pt states he is in too much pain and is avoiding all movements to not aggravate; continue as able with check back     [] Surgery:     [] Intubation:     [] Pain Medication:    [] Sedation:     [] Spine Precautions :    [] Medical Instability:    [] Other:       
PHONE SENT WITH PATIENT TO PACU  
[facility]  Office: [phone]  Baldomero Maldonado DO, Luis Valdivia DO, Eric Rojas DO, Rajinder Chiu DO, Erica Daugherty MD, Carolina Hernandez MD, Haleigh Sorto MD, Hannah Blum MD,  Isael Velazco MD, Cora Coughlin MD, Alex Ferrell MD,  Chela Doan DO, Trent Kearns MD, Manolo Potter MD, Hans Maldonado DO, Mei Tolbert MD,  Gianni De La Rosa DO, Aranza Wakefield MD, Magdalena Dill MD, Mary Ann Cheek MD, Jenny Williamson MD,  Panfilo Hall MD, Dorcas Gillis MD, Nato Gunter MD, Opal Davis MD, Red Small MD, Giana Larsen MD, Trenton Wong DO, Joe Franklin MD, Shirley Waterhouse, CNP,  Xena Harrell, CNP, Trenton Campbell, CNP,  Celena Corona, ANIYAH, Terrie Mathur, CNP, Dalia Lopes, CNP, Chiquita Manuel, CNP, Anh Kolb, CNP, CECY CasanovaC, CECY AbadC, Gris Thorpe, CNP, Reymundo Todd, CNP,  Cristal Padilla, CNP, Elizabeth Clemente, CNP,  Norma Soliman, CNP, Lima Boyle, CNP         [facility]   IN-PATIENT SERVICE   [facility]    Progress Note    10/17/2024    9:48 AM    Name:   Nicolas Cardenas  MRN:     [id number]     Acct:      [id number]   Room:   2020/2020-01  IP Day:  6  Admit Date:  10/11/2024  3:44 PM    PCP:   Rosa Pond APRN - NP  Code Status:  Full Code    Subjective:     C/C:   Chief Complaint   Patient presents with    Altered Mental Status    Urology refused to do procedure due to pt's condition     Interval History Status: not changed.     Was supposed to have his stent and Quesada catheter removed 10/16 however patient had hematuria Lovenox has been placed on hold.  Discharge held.  Quesada and right urethral stent were both removed today per urology    He is afebrile and vitals are stable    Hemoglobin is stable    Brief History:     Patient presents emergency department with complaints of altered mental status. He was scheduled to have an outpatient cystoscopy with stent exchange with urology services when the 
Patient off unit for procedure.  
Physical Therapy  DATE: 10/14/2024    NAME: Nicolas Cardenas  MRN: [id number]   : 1955    Patient not seen this date for Physical Therapy due to:      [] Cancel by RN or physician due to:    [] Hemodialysis    [] Critical Lab Value Level     [] Blood transfusion in progress    [] Acute or unstable cardiovascular status   _MAP < 55 or more than >115  _HR < 40 or > 130    [] Acute or unstable pulmonary status   -FiO2 > 60%   _RR < 5 or >40    _O2 sats < 85%    [] Strict Bedrest    [] Off Unit for surgery or procedure    [] Off Unit for testing       [] Pending imaging to R/O fracture    [x] Refusal by Patient  Am - eating breakfast Pm-on bedpan x 2     [] Other      [] PT being discontinued at this time. Patient independent. No further needs.     [] PT being discontinued at this time as the patient has been transferred to hospice care. No further needs.      ANJANA MERRITT, PT      
Physical Therapy  DATE: 10/21/2024    NAME: Nicolas Cardenas  MRN: [id number]   : 1955    Patient not seen this date for Physical Therapy due to:      [] Cancel by RN or physician due to:    [] Hemodialysis    [] Critical Lab Value Level     [] Blood transfusion in progress    [] Acute or unstable cardiovascular status   _MAP < 55 or more than >115  _HR < 40 or > 130    [] Acute or unstable pulmonary status   -FiO2 > 60%   _RR < 5 or >40    _O2 sats < 85%    [] Strict Bedrest    [] Off Unit for surgery or procedure    [] Off Unit for testing       [] Pending imaging to R/O fracture    [x] Refusal by Patient (Writer spent 10 mins speaking with patient as he continued to refuse treatment. MAX encouragement and continued to report \"My body can't so I won't do it.\" Patient currently on bed pan, writer offered to assist him off and apply a brief prior to sitting EOB to protect him. Patient declined and said \"I'm gonna need at least 2 hours on the bed pan.\")    [] Other      [] PT being discontinued at this time. Patient independent. No further needs.     [] PT being discontinued at this time as the patient has been transferred to hospice care. No further needs.      Becky Ocampo, PTA     
Physical Therapy  Facility/Department: [facility]  Daily Treatment Note  NAME: Nicolas Cardenas  : 1955  MRN: [id number]    Date of Service: 10/20/2024    Discharge Recommendations:  Patient would benefit from continued therapy after discharge    Pt currently functioning below baseline.  Recommend daily inpatient skilled therapy at time of discharge to maximize long term outcomes and prevent re-admission. Please refer to AM-PAC score for current level of function.     Patient Diagnosis(es): The primary encounter diagnosis was Community acquired pneumonia, unspecified laterality. Diagnoses of Hypoxemia, Kidney calculi, History of below knee amputation, left (HCC), and Type 1 diabetes mellitus with diabetic foot infection (HCC) were also pertinent to this visit.    Assessment  Assessment: Patient able to roll LT<>RT x 5 reps with CGA-MIN A for bed change and pericare. Patient iniatlly agreeable to sit EOB but then reported too much pain in his testicles and abruptly laid back down. Refused any further activity and exercises d/t \"Not feeling well.\"  Activity Tolerance: Patient limited by pain;Patient limited by endurance;Treatment limited secondary to decreased cognition;Treatment limited secondary to agitation;Patient limited by fatigue    Plan  Physical Therapy Plan  General Plan: 5-7 times per week  Current Treatment Recommendations: Strengthening;Transfer training;Therapeutic activities;Positioning;Patient/Caregiver education & training;Home exercise program    Restrictions  Restrictions/Precautions  Restrictions/Precautions: Fall Risk, Contact Precautions  Required Braces or Orthoses?:  (prosthetic once ok'd due to recent wound on Rt LE - appears healed but prosthetic has not been adjusted)  Position Activity Restriction  Other position/activity restrictions: Up w/ assist, telemetry, RUE IV, Quesada catheter, B LE amputations, alarms     Subjective   Subjective  Subjective: Patient sleeping heavily in bed. 
Provided education on straight cath at home. Patient refused, stated \"I'm not doing that, you're wasting your time even talking about it because I won't do it.\"  
[facility]  Office: [phone]  Baldomero Maldonado DO, Luis Valdivia DO, Eric Rojas DO, Rajinder Chiu DO, Erica Daugherty MD, Carolina Hernandez MD, Haleigh Sorto MD, Hannah Blum MD,  Isael Velazco MD, Cora Coughlin MD, Alex Ferrell MD,  Chela Doan DO, Trent Kearns MD, Manolo Potter MD, Hans Maldonado DO, Mei Tolbert MD,  Gianni De La Rosa DO, Aranza Wakefield MD, Magdalena Dill MD, Mary Ann Cheek MD, Jenny Williamson MD,  Panfilo Hall MD, Dorcas Gillis MD, Nato Gunter MD, Opal Davis MD, Red Small MD, Giana Larsen MD, Trenton Wong DO, Joe Franklin MD, Shirley Waterhouse, CNP,  Xena Harrell, CNP, Trenton Campbell, CNP,  Celena Corona, ANIYAH, Terrie Mathur, CNP, Dalia Lopes, CNP, Chiquita Manuel, CNP, Anh Kolb, CNP, Geetha Gold PAJesseC, CECY AbadC, Gris Thorpe, CNP, Reymundo Todd, CNP,  Cristal Padilla, CNP, Elizabeth Clemente, CNP,  Norma Soliman, CNP, Lima Boyle, CNP         [facility]   IN-PATIENT SERVICE   [facility]    Progress Note    10/19/2024    9:06 AM    Name:   Nicolas Cardenas  MRN:     [id number]     Acct:      [id number]   Room:   2020/2020-01  IP Day:  8  Admit Date:  10/11/2024  3:44 PM    PCP:   Rosa Pond APRN - NP  Code Status:  Full Code    Subjective:     C/C:   Chief Complaint   Patient presents with    Altered Mental Status    Urology refused to do procedure due to pt's condition     Interval History Status: not changed.     Persistent hyperkalemia despite treatment; will consult nephrology  Urine potassium and chloride ordered  Bladder scanned for 240    Brief History:     Patient presents emergency department with complaints of altered mental status. He was scheduled to have an outpatient cystoscopy with stent exchange with urology services when the patient was noted to have altered mentation. There were also concerns regarding placement as there were reports that the patient leaving for his 
[facility]  Office: [phone]  Baldomero Maldonado DO, Luis Valdivia DO, Eric Rojas DO, Rajinder Chiu DO, Erica Daugherty MD, Carolina Hernandez MD, Haleigh Sorto MD, Hannah Blum MD,  Isael Velazco MD, Cora Coughlin MD, Alex Ferrell MD,  Chela Doan DO, Trent Kearns MD, Manolo Potter MD, Hans Maldonado DO, Mei Tolbert MD,  Gianni De La Rosa DO, Aranza Wakefield MD, Magdalena Dill MD, Mary Ann Cheek MD, Jenny Williamson MD,  Panfilo Hall MD, Dorcas Gillis MD, Nato Gunter MD, Opal Davis MD, Red Small MD, Giana Larsen MD, Trenton Wong DO, Joe Franklin MD, Shirley Waterhouse, CNP,  Xena Harrell CNP, Trenton Campbell, PRAFUL,  Celena Corona, ANIYAH, Terrie Mathur, CNP, Dalia Lopes, CNP, Chiquita Manuel, CNP, Anh Kolb, CNP, Geetha Gold PAJesseC, CECY AbadC, Gris Thorpe, CNP, Reymundo Todd, CNP,  Cristal Padilla, CNP, Elizabeth Clemente, CNP,  Norma Soliman, CNP, Lima Boyle, CNP         [facility]   IN-PATIENT SERVICE   [facility]    Progress Note    10/20/2024    8:11 AM    Name:   Nicolas Cardenas  MRN:     [id number]     Acct:      [id number]   Room:   2020/2020-01  IP Day:  9  Admit Date:  10/11/2024  3:44 PM    PCP:   Rosa Pond APRN - NP  Code Status:  Full Code    Subjective:     C/C:   Chief Complaint   Patient presents with    Altered Mental Status    Urology refused to do procedure due to pt's condition     Interval History Status: not changed.     He asks for IV pain medications with all my interactions with him. He states it \"just helps\"  No specific complaints   Potassium is 5.3; creatine is trending down    Brief History:     Patient presents emergency department with complaints of altered mental status. He was scheduled to have an outpatient cystoscopy with stent exchange with urology services when the patient was noted to have altered mentation. There were also concerns regarding placement as there were reports 
Pt with c/o crampy abdominal pain; prn percocet and prn mylicon not effective. Pt is requesting IV morphine or dilaudid.  Redraw of BMP shows potassium at 5.8  Secure message sent to Anh Mauricio NP; new orders received for one time dose of 30g of Kayexalate and one time dose of 2mg IV morphine.  Pt has been voiding cloudy, white/yellowish urine.     03:15  Potassium level at 5.3 this am  06:40  Pt had large, watery BM per bedpan; cleaned up and ointment applied  
RN attempted to obtain most recent med list from daughter Marisela as requested by patient.     RN was told by ED that a list was requested from [facility] where patient resided, but the patient had signed out AMA and was discharged so they were not sure if they could get the list sent, and a list was never sent to ED staff.     Daughter was unsure about several medications as nursing home she states was not giving him medications he was supposed to be getting and was giving him medications that he had not been getting prior. Med rec is listed as \"in progress\" from writer's standpoint until daytime when someone can clarify his list. Daughter stated she would try in the morning to get a updated list from an \"Ombudsman\" who she states oversee's the nursing homes.--Dyan ARIAS RN 10/12/2024 0041  
RN called Report to [facility]. RICHY Guerra. Gave unit call back number.   
RN called pharm to double check giving Percocet now and Morphine at DC at 7 pm. Pharmacist Parker stated okay to give med.   
RN completed Post void residual on patient. Patient urinated 100 mls, Bladder scanned 263 mls. RN reminded patient that he was still retaining urine and that the doctor had placed orders to straight cath if needed. Patient refused straight cath. RN educated patient on benefits to straight cath and not retaining urine.  RN notified Dr Gao that patient was refusing.   
Reason for Follow up: Acute kidney injury  Chronic kidney disease stage IIIb  Leukocytosis  Urinary retention  Hyperkalemia  Type 2 diabetes  Status post bilateral BKA  History of PE  Nephrolithiasis  Bladder stone status post cystoscopy and ureteral stent exchange and cystolitholapaxy  Urethral stricture  Recurrent C. difficile colitis      HISTORY:    Patient seen and evaluated discussed with RN.  Patient still having elevated postvoid residual volume of 260+.  Patient remains on IV fluid urine output improving renal function improving    Review Of Systems:   Constitutional: No fever, chills, lethargy, weakness or wt loss.  Cardiac:  No chest pain, dyspnea, orthopnea or PND.  Pulmonary:  No cough, phlegm or wheezing.  Abdomen:  No abdominal pain, nausea, vomiting or diarrhea.  :   No hematuria, pyuria, dysuria or flank pain.  Extremities:  No swelling or joint pains.      Review Of Systems:   Constitutional: No fever, chills, lethargy, weakness or wt loss.  HEENT:  No headache, nasal discharge or sore throat.  Cardiac:  No chest pain, dyspnea, orthopnea or PND.  Pulmonary:  No cough, phlegm or wheezing.  Abdomen:  No abdominal pain, nausea, vomiting or diarrhea.  Neuro:  No gross focal weakness, numbness, abnormal movements or seizure like activity.  Skin:   No rashes or itching.  :   No hematuria, pyuria, dysuria or flank pain.  Extremities:  No swelling or joint pains.  Endocrine: No polyuria, polydypsia, or thyroid problems.  Hematology:    No bleeding disorders, bruising or anemia.  All other ROS is negative.  Unable to obtain because he is intubated and sedated.     Scheduled Meds:   vancomycin  750 mg IntraVENous Q24H    sodium zirconium cyclosilicate  10 g Oral Daily    vancomycin (VANCOCIN) intermittent dosing (placeholder)   Other RX Placeholder    miconazole   Topical BID    lactobacillus  1 capsule Oral TID     insulin glargine  20 Units SubCUTAneous Daily    clotrimazole-betamethasone   Topical 
[facility]  Office: [phone]  Baldomero Maldonado DO, Luis Valdivia DO, Eric Rojas DO, Rajinder Chiu DO, Erica Daugherty MD, Carolina Hernandez MD, Haleigh Sorto MD, Hannah Blum MD,  Isael Velazco MD, Cora Coughlin MD, Alex Ferrell MD,  Chela Doan DO, Trent Kearns MD, Manolo Potter MD, Hans Maldonado DO, Mei Tolbert MD,  Gianni De La Rosa DO, Aranza Wakefield MD, Magdalena iDll MD, Mary Ann Cheek MD, Jenny Williamson MD,  Panfilo Hall MD, Dorcas Gillis MD, Nato Gunter MD, Opal Davis MD, Red Small MD, Giana Larsen MD, Trenton Wong DO, Joe Franklin MD, Shirley Waterhouse, CNP,  Xena Harrell, CNP, Trenton Campbell, CNP,  Celena Corona, ANIYAH, Terrie Mathur, CNP, Dalia Lopes, CNP, Chiquita Manuel, CNP, Anh Kolb, CNP, Geetha Gold PAJesseC, CECY AbadC, Gris Thorpe, CNP, Reymundo Todd, CNP,  Cristal Padilla, CNP, Elizabeth Clemente, CNP,  Norma Soliman, CNP, Lima Boyle, CNP         [facility]   IN-PATIENT SERVICE   [facility]    Progress Note    10/13/2024    3:10 PM    Name:   Nicolas Cardenas  MRN:     [id number]     Acct:      [id number]   Room:   2020/2020-01  IP Day:  2  Admit Date:  10/11/2024  3:44 PM    PCP:   Rosa Pond APRN - NP  Code Status:  Full Code    Subjective:     C/C:   Chief Complaint   Patient presents with    Altered Mental Status    Urology refused to do procedure due to pt's condition     Interval History Status: .   Patient extremely anxious  Is a known patient of chronic resistant C. difficile, currently on slow tapering dose of oral vancomycin  Asking something for gas in stomach  He is afebrile, saturating 97%   Blood glucose 118, creatinine 1.5  Urine has too many WBCs  Urology took him to the OR today and did CYSTOSCOPY URETERAL STENT EXCHANGE RIGHT STRING ON,CYSTOLITHOLOPEXY     Brief History:     Nicolas Cardenas is a 69 y.o. Non- / non  male who presents with Altered Mental 
[facility]  Office: [phone]  Baldomero Maldonado DO, Luis Valdivia DO, Eric Rojas DO, Rajinder Chiu DO, Erica Daugherty MD, Carloina Hernandez MD, Haleigh Sorto MD, Hannah Blum MD,  Isael Velazoc MD, Cora Coughlin MD, Alex Ferrell MD,  Chela Doan DO, Trent Kearns MD, Manolo Potter MD, Hans Maldonado DO, Mei Tolbert MD,  Gianni De La Rosa DO, Aranza Wakefield MD, Magdalena Dill MD, Mary Ann Cheek MD, Jenny Williamson MD,  Panfilo Hall MD, Dorcas Gillis MD, Nato Gunter MD, Opal Davis MD, Red Small MD, Giana Larsen MD, Trenton Wong DO, Joe Franklin MD, Shirley Waterhouse, CNP,  Xena Harrell, CNP, Trenton Campbell, CNP,  Celena Corona, ANIYAH, Terrie Mathur, CNP, Dalia Lopes, CNP, Chiquita Manuel, CNP, Anh Kolb, CNP, Geetha Gold PAJesseC, CECY AbadC, Gris Thorpe, CNP, Reymundo Todd, CNP,  Cristal Padilla, CNP, Elizabeth Clemente, CNP,  Norma Soliman, CNP, Lima Boyle, CNP         [facility]   IN-PATIENT SERVICE   [facility]    Progress Note    10/15/2024    8:40 AM    Name:   Nicolas Cardenas  MRN:     [id number]     Acct:      [id number]   Room:   2020/2020-01  IP Day:  4  Admit Date:  10/11/2024  3:44 PM    PCP:   Rosa Pond APRN - NP  Code Status:  Full Code    Subjective:     C/C:   Chief Complaint   Patient presents with    Altered Mental Status    Urology refused to do procedure due to pt's condition     Interval History Status: not changed.     Patient evaluated in room sitting in bed asking for 50 mg of IV Benadryl with his pain medications.  No other patient concerns at this time.    Brief History:     Patient presents emergency department with complaints of altered mental status. He was scheduled to have an outpatient cystoscopy with stent exchange with urology services when the patient was noted to have altered mentation. There were also concerns regarding placement as there were reports that the patient leaving 
Spiritual Health History and Assessment/Progress Note  [facility]    (P) Spiritual/Emotional Needs,  ,  ,      Name: Nicolas Cardenas MRN: [id number]    Age: 69 y.o.     Sex: male   Language: English   Jewish: Faith   Community acquired pneumonia     Date: 10/18/2024            Total Time Calculated: (P) 6 min              Spiritual Assessment continued in [facility]        Referral/Consult From: (P) Patient   Encounter Overview/Reason: (P) Spiritual/Emotional Needs  Service Provided For: (P) Patient  Patient requested prayer.  Caroline, Belief, Meaning:   Patient has beliefs or practices that help with coping during difficult times  Family/Friends No family/friends present      Importance and Influence:  Patient has spiritual/personal beliefs that influence decisions regarding their health  Family/Friends No family/friends present    Community:  Patient feels well-supported. Support system includes: Children and Friends  Family/Friends No family/friends present    Assessment and Plan of Care:     Patient Interventions include: Facilitated expression of thoughts and feelings and Explored spiritual coping/struggle/distress  Family/Friends Interventions include: No family/friends present    Patient Plan of Care: Spiritual Care available upon further referral  Family/Friends Plan of Care: Spiritual Care available upon further referral    Electronically signed by Chaplain Orlando on 10/18/2024 at 1:17 PM   
Spiritual Health History and Assessment/Progress Note  [facility]    Initial Encounter,  ,  ,      Name: Nicolas Cardenas MRN: [id number]    Age: 69 y.o.     Sex: male   Language: English   Spiritism: Anglican   Community acquired pneumonia of right lower lobe of lung     Date: 10/14/2024            Total Time Calculated: (P) 9 min              Spiritual Assessment began in [facility]        Referral/Consult From: Rounding   Encounter Overview/Reason: Initial Encounter  Service Provided For: Patient    Caroline, Belief, Meaning:   Patient identifies as spiritual  Family/Friends No family/friends present      Importance and Influence:  Patient has no beliefs influential to healthcare decision-making identified during this visit  Family/Friends No family/friends present    Community:  Patient feels well-supported. Support system includes: Children and Friends  Family/Friends No family/friends present    Assessment and Plan of Care:     Patient Interventions include: Facilitated expression of thoughts and feelings and Explored spiritual coping/struggle/distress  Family/Friends Interventions include: No family/friends present    Patient Plan of Care: Spiritual Care available upon further referral  Family/Friends Plan of Care: Spiritual Care available upon further referral    Electronically signed by Chaplain Orlando on 10/14/2024 at 3:46 PM   
Transitions of Care Pharmacy Service   Medication Review    The patient's list of current home medications has been reviewed.     Source(s) of information: [facility] med list, Surescripts    Based on information provided by the above source(s), I have updated the patient's home med list as described below.     Please review the ACTION REQUESTED section of this note below for any discrepancies on current hospital orders.      I changed or updated the following medications on the patient's home medication list:  Removed Amlodipine  Metoprolol succinate     Added Metoprolol tartrate      Adjusted   Diphenhydramine  Warfarin   Other Notes          PROVIDER ACTION REQUESTED  Medications that need to be addressed by a physician/nurse practitioner:    Current inpatient order(s) Action requested by pharmacy   Amlodipine Not a current medication. Please consider discontinuing if appropriate.    Metoprolol succinate Takes Metoprolol tartrate at home. Please consider adjusting if appropriate.        Please feel free to call me with any questions about this encounter. Thank you.    Ursula Nava RPH   Transitions of Care Pharmacy Service  Phone:  [phone]  Fax: [phone]      Electronically signed by Ursula Nava RPH on 10/12/2024 at 6:10 PM     Prior to Admission medications    Medication Sig Start Date End Date Taking? Authorizing Provider   metoprolol tartrate (LOPRESSOR) 25 MG tablet Take 1 tablet by mouth 2 times daily Hold for SBP < 120 or HR <60   Yes Bill Angel MD   oxyCODONE-acetaminophen (PERCOCET) 5-325 MG per tablet Take 1 tablet by mouth every 4 hours as needed for Pain. Max Daily Amount: 6 tablets    Bill Angel MD   traZODone (DESYREL) 50 MG tablet Take 1 tablet by mouth nightly    Bill Angel MD   busPIRone (BUSPAR) 5 MG tablet Take 1 tablet by mouth 2 times daily    Bill Angel MD   docusate sodium (COLACE) 100 MG capsule Take 1 capsule by 
Warfarin Dosing - Pharmacy Consult Note  Consulting Provider: Reymundo Roach CNP   Indication:  History of  VTE/PE  Warfarin Dose prior to admission: 2.5mg daily - but patient has required considerably higher coumadin dosing on last admission !!   Concurrent anticoagulants/antiplatelets: none  Significant Drug Interactions: No obvious interactions  Recent Labs     10/17/24  0602 10/18/24  0607 10/19/24  0644   INR 1.9 2.0 1.4   HGB 9.3* 8.8* 9.0*   * 639* 643*     Recent warfarin administrations                     warfarin (COUMADIN) tablet 2.5 mg (mg) 2.5 mg Given 10/18/24 1711    warfarin (COUMADIN) tablet 2.5 mg (mg) 2.5 mg Given 10/17/24 1823    warfarin (COUMADIN) tablet 2.5 mg (mg) 2.5 mg Given 10/16/24 1837                   Date   INR    Dose  10/11      1.2      none - pt refused  10/12      1.0      10 mg  10/13      1.0       10 mg  10/14      1.5        10mg  10/15      1.8         2.5 mg  10/16       2.2        2.5mg  10/17       1.9        2.5mg  10/18        2.0       2.5 mg  10/19        1.4    Assessment/Plan  (Goal INR: 2 - 3)  INR subtherapeutic again. Patient requires more than 2.5mg daily of coumadin. Will re-boost him with 10mg today. INR in AM.     Active problem list reviewed.  INR orders are placed.  Chart reviewed for pertinent labs, drug/diet interactions, and past doses.  Documentation of patient's clinical condition was reviewed.    Pharmacy Dosing:  Pharmacy will continue to follow.       
Warfarin Dosing - Pharmacy Consult Note  Consulting Provider: Reymundo Roach CNP  Indication:  History of  VTE/PE  Warfarin Dose prior to admission: 2.5mg daily   Concurrent anticoagulants/antiplatelets: none  Significant Drug Interactions: No obvious interactions  Recent Labs     10/15/24  0643 10/16/24  0534 10/17/24  0602 10/18/24  0607   INR 1.8 2.2 1.9 2.0   HGB 9.1*  --  9.3* 8.8*   *  --  708* 639*     Recent warfarin administrations                     warfarin (COUMADIN) tablet 2.5 mg (mg) 2.5 mg Given 10/17/24 1823    warfarin (COUMADIN) tablet 2.5 mg (mg) 2.5 mg Given 10/16/24 1837    warfarin (COUMADIN) tablet 2.5 mg (mg) 2.5 mg Given 10/15/24 1800                   Date   INR    Dose  10/11     1.2       none - pt refused  10/12     1.0       10 mg  10/13     1.0       10 mg  10/14     1.5       10 mg   10/15     1.8       2.5 mg  10/16     2.2       2.5 mg  10/17     1.9       2.5 mg  10/18     2.0    Assessment/Plan  (Goal INR: 2 - 3)  INR therapeutic. Continue home dosing.   Warfarin 2.5mg today. INR in AM.     Active problem list reviewed.  INR orders are placed.  Chart reviewed for pertinent labs, drug/diet interactions, and past doses.  Documentation of patient's clinical condition was reviewed.    Pharmacy Dosing:  Pharmacy will continue to follow.      
Warfarin Dosing - Pharmacy Consult Note  Consulting Provider: Reymundo Todd CNP   Indication:  History of  VTE/PE  Warfarin Dose prior to admission: 10mg daily  Concurrent anticoagulants/antiplatelets: none  Significant Drug Interactions: No obvious interactions  Recent Labs     10/18/24  0607 10/19/24  0644 10/20/24  0312   INR 2.0 1.4 1.3   HGB 8.8* 9.0*  --    * 643*  --      Recent warfarin administrations                     warfarin (COUMADIN) tablet 10 mg (mg) 10 mg Given 10/19/24 1845    warfarin (COUMADIN) tablet 2.5 mg (mg) 2.5 mg Given 10/18/24 1711    warfarin (COUMADIN) tablet 2.5 mg (mg) 2.5 mg Given 10/17/24 1823                   Date   INR    Dose  10/11     1.2        none - pt refused  10/12     1.0        10 mg  10/13     1.0        10 mg  10/14     1.5        10 mg  10/15     1.8        2.5 mg  10/16     2.2        2.5 mg  10/17     1.9        2.5 mg  10/18     2.0        2.5mg  10/19     1.4         10 mg  10/20     1.3     Assessment/Plan  (Goal INR: 2 - 3)  INR below goal again. Coumadin 10mg x 1 today. INR in AM.     Active problem list reviewed.  INR orders are placed.  Chart reviewed for pertinent labs, drug/diet interactions, and past doses.  Documentation of patient's clinical condition was reviewed.    Pharmacy Dosing:  Pharmacy will continue to follow.       
Warfarin Dosing - Pharmacy Consult Note  Consulting Provider: Reymundo Todd CNP   Indication:  History of  VTE/PE  Warfarin Dose prior to admission: 2.5 mg daily (but has required an average of 6 mg daily on last admission)  Concurrent anticoagulants/antiplatelets: none  Significant Drug Interactions: No obvious interactions  Recent Labs     10/19/24  0644 10/20/24  0312 10/21/24  0732   INR 1.4 1.3 2.0   HGB 9.0*  --   --    *  --   --      Recent warfarin administrations                     warfarin (COUMADIN) tablet 10 mg (mg) 10 mg Given 10/20/24 1713    warfarin (COUMADIN) tablet 10 mg (mg)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 ultrasound considering recent images available, check basic urine renal studies, avoid nephrotoxins as able     Dysuria with concerns for UTI and recent stenting  On Rocephin for #1, which should provide adequate coverage  Follow along final urine culture  Consult urology for cystoscopy and stent exchange     Recent pulmonary embolism in a secondary hypercoagulable state due to prolonged immobilization  Consult pharmacy to dose warfarin with goal INR 2-3     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    
mellitus with diabetic polyneuropathy, with long-term current use of insulin ([facility]) 10/11/2024 Yes    COPD without exacerbation ([facility]) 10/11/2024 Yes    Urinary tract infection without hematuria 10/11/2024 Yes    Severe malnutrition ([facility]) 10/15/2024 Yes    Candida infection 10/17/2024 Yes       Plan:        Obstructive uropathy:  Status post Cystoscopy with stent exchange and cystolitholopexy completed on 10/13/2024.  On Flomax and Ditropan, management per urology-initially unable to pull Quesada and stent secondary to hematuria.  Per urology Quesada catheter and right urethral stent were removed 10/17.  He chronically has a  high postvoid residual and the patient was advised of the importance of intermittent self-catheterization.  UTI with positive Candida: Completed a course of Rocephin and azithromycin.  ID was consulted recommending IV vancomycin with transition to IV vancomycin that could be changed to oral Zyvox 600 mg twice daily on discharge through 10/25 and p.o. Diflucan through 10/23/24.  Acute kidney injury on CKD stage III: Secondary to #1, with bump in creatinine and hyperkalemia nephrology was consulted.  Orders placed for daily Lokelma, IV fluids were initiated.  Checking BMP every 4 hours for now.  Low potassium diet. Potassium is 5.3; creatine is trending down  Hyperkalemia: Nephrology consulted; follow recommendations  Recurrent C. difficile infection: Vancomycin taper continues. Tolerating well , diarrhea improving  COPD: At baseline.  Continue home inhalers  IDDM T2: Hyperglycemia improving after increasing Lantus from 18 units twice daily to 20 units twice daily, goal between 140 and 180.  Sugars continue to fluctuate.  His hemoglobin A1c 2 weeks ago was 7.4.  Consult placed to [facility] diabetic education/pharmacist.  Anxiety with depression: Guarded, continue BuSpar, Seroquel  Current everyday smoker: Cessation strongly encouraged.  Continue nicotine patch  WALLACE: resume ferrous sulfate  History of 
uropathy:  Status post Cystoscopy with stent exchange and cystolitholopexy completed on 10/13/2024.  On Flomax and Ditropan, management per urology-initially unable to pull Quesada and stent secondary to hematuria.  Per urology Quesada catheter and right urethral stent were removed 10/17.  He chronically has a  high postvoid residual and the patient was advised of the importance of intermittent self-catheterization.  UTI with positive Candida: Completed a course of Rocephin and azithromycin.  ID was consulted recommending IV vancomycin with transition to IV vancomycin that could be changed to oral Zyvox 600 mg twice daily on discharge through 10/25 and p.o. Diflucan through 10/23/24.  Acute kidney injury on CKD stage III: Secondary to #1, resolved  Hyperkalemia: Treated accordingly and continue to monitor  Recurrent C. difficile infection: Vancomycin taper continues. Tolerating well , diarrhea improving  COPD: At baseline.  Continue home inhalers  IDDM T2: Hyperglycemia improving after increasing Lantus from 18 units twice daily to 20 units twice daily, goal between 140 and 180.  Sugars continue to fluctuate.  His hemoglobin A1c 2 weeks ago was 7.4.  Consult placed to [facility] diabetic education/pharmacist.  Anxiety with depression: Guarded, continue BuSpar, Seroquel  Current everyday smoker: Cessation strongly encouraged.  Continue nicotine patch  WALLACE: resume ferrous sulfate  History of DVT: On Coumadin/Lovenox bridging dc'd-INR stable.   Pepcid  Stable for discharge when cleared by urology    MARCELO Jerry - CNP  10/18/2024  7:42 AM     
MD  [employer] messaging  OFFICE: [phone]    Thank you for allowing us to participate in the care of this patient. Please call with questions.     This note is created with the assistance of a speech recognition program.  While intending to generate a document that actually reflects the content of the visit, the document can still have some errors including those of syntax and sound a like substitutions which may escape proof reading.  In such instances, actual meaning can be extrapolated by contextual diversion.

## 2024-10-21 NOTE — CARE COORDINATION
Social Work- Received phone call from [facility]. Patientwas approved for admission to [facility]. Left message for  [facility].

## 2024-12-27 ENCOUNTER — HOSPITAL ENCOUNTER (OUTPATIENT)
Age: 69
Setting detail: SPECIMEN
Discharge: HOME | End: 2024-12-27

## 2024-12-27 LAB
ALBUMIN SERPL-MCNC: 3 G/DL (ref 3.5–5.2)
ALBUMIN/GLOB SERPL: 0.8 {RATIO} (ref 1–2.5)
ALP SERPL-CCNC: 148 U/L (ref 40–129)
ALT SERPL-CCNC: 10 U/L (ref 10–50)
AMYLASE SERPL-CCNC: 32 U/L (ref 28–100)
ANION GAP SERPL CALCULATED.3IONS-SCNC: 11 MMOL/L (ref 9–16)
AST SERPL-CCNC: 11 U/L (ref 10–50)
BILIRUB SERPL-MCNC: <0.2 MG/DL (ref 0–1.2)
BUN SERPL-MCNC: 20 MG/DL (ref 8–23)
CALCIUM SERPL-MCNC: 9 MG/DL (ref 8.8–10.2)
CHLORIDE SERPL-SCNC: 107 MMOL/L (ref 98–107)
CHOLEST SERPL-MCNC: 90 MG/DL (ref 0–199)
CHOLESTEROL/HDL RATIO: 2.5
CO2 SERPL-SCNC: 28 MMOL/L (ref 20–31)
CREAT SERPL-MCNC: 1.1 MG/DL (ref 0.7–1.2)
ERYTHROCYTE [DISTWIDTH] IN BLOOD BY AUTOMATED COUNT: 16.5 % (ref 11.8–14.4)
GFR, ESTIMATED: 70 ML/MIN/1.73M2
GLUCOSE SERPL-MCNC: 55 MG/DL (ref 82–115)
HCT VFR BLD AUTO: 32.7 % (ref 40.7–50.3)
HDLC SERPL-MCNC: 36 MG/DL
HGB BLD-MCNC: 9.5 G/DL (ref 13–17)
LDLC SERPL CALC-MCNC: 41 MG/DL (ref 0–100)
LIPASE SERPL-CCNC: 10 U/L (ref 13–60)
MAGNESIUM SERPL-MCNC: 1.8 MG/DL (ref 1.6–2.4)
MCH RBC QN AUTO: 26.2 PG (ref 25.2–33.5)
MCHC RBC AUTO-ENTMCNC: 29.1 G/DL (ref 28.4–34.8)
MCV RBC AUTO: 90.1 FL (ref 82.6–102.9)
NRBC BLD-RTO: 0 PER 100 WBC
PLATELET # BLD AUTO: 642 K/UL (ref 138–453)
PMV BLD AUTO: 9.4 FL (ref 8.1–13.5)
POTASSIUM SERPL-SCNC: 3.8 MMOL/L (ref 3.7–5.3)
PROT SERPL-MCNC: 6.7 G/DL (ref 6.6–8.7)
RBC # BLD AUTO: 3.63 M/UL (ref 4.21–5.77)
SODIUM SERPL-SCNC: 146 MMOL/L (ref 136–145)
TRIGL SERPL-MCNC: 64 MG/DL (ref 0–149)
VLDLC SERPL CALC-MCNC: 13 MG/DL (ref 1–30)
WBC OTHER # BLD: 15.1 K/UL (ref 3.5–11.3)

## 2024-12-27 PROCEDURE — 82150 ASSAY OF AMYLASE: CPT

## 2024-12-27 PROCEDURE — 80061 LIPID PANEL: CPT

## 2024-12-27 PROCEDURE — 80053 COMPREHEN METABOLIC PANEL: CPT

## 2024-12-27 PROCEDURE — 36415 COLL VENOUS BLD VENIPUNCTURE: CPT

## 2024-12-27 PROCEDURE — 85027 COMPLETE CBC AUTOMATED: CPT

## 2024-12-27 PROCEDURE — P9603 ONE-WAY ALLOW PRORATED MILES: HCPCS

## 2024-12-27 PROCEDURE — 83690 ASSAY OF LIPASE: CPT

## 2024-12-27 PROCEDURE — 83735 ASSAY OF MAGNESIUM: CPT

## 2024-12-31 ENCOUNTER — HOSPITAL ENCOUNTER (OUTPATIENT)
Age: 69
Setting detail: SPECIMEN
Discharge: HOME | End: 2024-12-31
Payer: COMMERCIAL

## 2024-12-31 ENCOUNTER — APPOINTMENT (OUTPATIENT)
Dept: CT IMAGING | Age: 69
End: 2024-12-31
Payer: COMMERCIAL

## 2024-12-31 ENCOUNTER — HOSPITAL ENCOUNTER (EMERGENCY)
Age: 69
Discharge: HOME | End: 2024-12-31
Attending: EMERGENCY MEDICINE
Payer: COMMERCIAL

## 2024-12-31 VITALS
RESPIRATION RATE: 16 BRPM | DIASTOLIC BLOOD PRESSURE: 63 MMHG | SYSTOLIC BLOOD PRESSURE: 138 MMHG | HEART RATE: 84 BPM | WEIGHT: 132 LBS | BODY MASS INDEX: 17.42 KG/M2 | TEMPERATURE: 97.9 F | OXYGEN SATURATION: 94 %

## 2024-12-31 DIAGNOSIS — K59.09 OTHER CONSTIPATION: Primary | ICD-10-CM

## 2024-12-31 LAB
ALBUMIN SERPL-MCNC: 3.1 G/DL (ref 3.5–5.2)
ALBUMIN/GLOB SERPL: 0.8 {RATIO} (ref 1–2.5)
ALP SERPL-CCNC: 134 U/L (ref 40–129)
ALT SERPL-CCNC: 6 U/L (ref 10–50)
ANION GAP SERPL CALCULATED.3IONS-SCNC: 12 MMOL/L
ANION GAP SERPL CALCULATED.3IONS-SCNC: 9 MMOL/L (ref 9–16)
AST SERPL-CCNC: 15 U/L (ref 10–50)
BASOPHILS # BLD: 0.07 K/UL (ref 0–0.2)
BASOPHILS NFR BLD: 1 % (ref 0–2)
BILIRUB SERPL-MCNC: <0.2 MG/DL (ref 0–1.2)
BUN SERPL-MCNC: 21 MG/DL (ref 8–23)
BUN SERPL-MCNC: 21 MG/DL (ref 8–23)
CALCIUM SERPL-MCNC: 9 MG/DL (ref 8.8–10.2)
CALCIUM SERPL-MCNC: 9.5 MG/DL (ref 8.8–10.2)
CHLORIDE SERPL-SCNC: 104 MMOL/L (ref 98–107)
CHLORIDE SERPL-SCNC: 104 MMOL/L (ref 98–107)
CO2 SERPL-SCNC: 26 MMOL/L (ref 20–31)
CO2 SERPL-SCNC: 30 MMOL/L (ref 20–31)
CREAT SERPL-MCNC: 1 MG/DL (ref 0.7–1.2)
CREAT SERPL-MCNC: 1 MG/DL (ref 0.7–1.2)
EOSINOPHIL # BLD: 0.39 K/UL (ref 0–0.44)
EOSINOPHILS RELATIVE PERCENT: 4 % (ref 1–4)
ERYTHROCYTE [DISTWIDTH] IN BLOOD BY AUTOMATED COUNT: 15.9 % (ref 11.8–14.4)
ERYTHROCYTE [DISTWIDTH] IN BLOOD BY AUTOMATED COUNT: 15.9 % (ref 11.8–14.4)
GFR, ESTIMATED: 85 ML/MIN/1.73M2
GFR, ESTIMATED: 87 ML/MIN/1.73M2
GLUCOSE SERPL-MCNC: 161 MG/DL (ref 82–115)
GLUCOSE SERPL-MCNC: 211 MG/DL (ref 82–115)
HCT VFR BLD AUTO: 32.6 % (ref 40.7–50.3)
HCT VFR BLD AUTO: 32.8 % (ref 40.7–50.3)
HGB BLD-MCNC: 9.6 G/DL (ref 13–17)
HGB BLD-MCNC: 9.7 G/DL (ref 13–17)
IMM GRANULOCYTES # BLD AUTO: 0.06 K/UL (ref 0–0.3)
IMM GRANULOCYTES NFR BLD: 1 %
LYMPHOCYTES NFR BLD: 1.53 K/UL (ref 1.1–3.7)
LYMPHOCYTES RELATIVE PERCENT: 16 % (ref 24–43)
MCH RBC QN AUTO: 25.5 PG (ref 25.2–33.5)
MCH RBC QN AUTO: 25.7 PG (ref 25.2–33.5)
MCHC RBC AUTO-ENTMCNC: 29.3 G/DL (ref 28.4–34.8)
MCHC RBC AUTO-ENTMCNC: 29.8 G/DL (ref 28.4–34.8)
MCV RBC AUTO: 86.2 FL (ref 82.6–102.9)
MCV RBC AUTO: 87 FL (ref 82.6–102.9)
MONOCYTES NFR BLD: 0.77 K/UL (ref 0.1–1.2)
MONOCYTES NFR BLD: 8 % (ref 3–12)
NEUTROPHILS NFR BLD: 71 % (ref 36–65)
NEUTS SEG NFR BLD: 6.92 K/UL (ref 1.5–8.1)
NRBC BLD-RTO: 0 PER 100 WBC
NRBC BLD-RTO: 0 PER 100 WBC
PLATELET # BLD AUTO: 678 K/UL (ref 138–453)
PLATELET # BLD AUTO: 801 K/UL (ref 138–453)
PMV BLD AUTO: 8.6 FL (ref 8.1–13.5)
PMV BLD AUTO: 9.1 FL (ref 8.1–13.5)
POTASSIUM SERPL-SCNC: 4.1 MMOL/L (ref 3.7–5.3)
POTASSIUM SERPL-SCNC: 4.6 MMOL/L (ref 3.7–5.3)
PROT SERPL-MCNC: 7.1 G/DL (ref 6.6–8.7)
RBC # BLD AUTO: 3.77 M/UL (ref 4.21–5.77)
RBC # BLD AUTO: 3.78 M/UL (ref 4.21–5.77)
RBC # BLD: ABNORMAL 10*6/UL
SODIUM SERPL-SCNC: 142 MMOL/L (ref 136–145)
SODIUM SERPL-SCNC: 142 MMOL/L (ref 136–145)
WBC OTHER # BLD: 10.6 K/UL (ref 3.5–11.3)
WBC OTHER # BLD: 9.7 K/UL (ref 3.5–11.3)

## 2024-12-31 PROCEDURE — 85025 COMPLETE CBC W/AUTO DIFF WBC: CPT

## 2024-12-31 PROCEDURE — 80048 BASIC METABOLIC PNL TOTAL CA: CPT

## 2024-12-31 PROCEDURE — 36415 COLL VENOUS BLD VENIPUNCTURE: CPT

## 2024-12-31 PROCEDURE — 96374 THER/PROPH/DIAG INJ IV PUSH: CPT

## 2024-12-31 PROCEDURE — 2500000003 HC RX 250 WO HCPCS: Performed by: EMERGENCY MEDICINE

## 2024-12-31 PROCEDURE — 85027 COMPLETE CBC AUTOMATED: CPT

## 2024-12-31 PROCEDURE — 80053 COMPREHEN METABOLIC PANEL: CPT

## 2024-12-31 PROCEDURE — 96376 TX/PRO/DX INJ SAME DRUG ADON: CPT

## 2024-12-31 PROCEDURE — 6360000002 HC RX W HCPCS: Performed by: EMERGENCY MEDICINE

## 2024-12-31 PROCEDURE — 99284 EMERGENCY DEPT VISIT MOD MDM: CPT

## 2024-12-31 PROCEDURE — 74176 CT ABD & PELVIS W/O CONTRAST: CPT

## 2024-12-31 PROCEDURE — 96375 TX/PRO/DX INJ NEW DRUG ADDON: CPT

## 2024-12-31 PROCEDURE — 6370000000 HC RX 637 (ALT 250 FOR IP): Performed by: EMERGENCY MEDICINE

## 2024-12-31 PROCEDURE — P9603 ONE-WAY ALLOW PRORATED MILES: HCPCS

## 2024-12-31 PROCEDURE — 2580000003 HC RX 258: Performed by: EMERGENCY MEDICINE

## 2024-12-31 RX ORDER — SODIUM PHOSPHATE, DIBASIC AND SODIUM PHOSPHATE, MONOBASIC 7; 19 G/230ML; G/230ML
118 ENEMA RECTAL ONCE
Status: COMPLETED | OUTPATIENT
Start: 2024-12-31 | End: 2024-12-31

## 2024-12-31 RX ORDER — ONDANSETRON 2 MG/ML
4 INJECTION INTRAMUSCULAR; INTRAVENOUS ONCE
Status: COMPLETED | OUTPATIENT
Start: 2024-12-31 | End: 2024-12-31

## 2024-12-31 RX ORDER — HYDROMORPHONE HYDROCHLORIDE 1 MG/ML
1 INJECTION, SOLUTION INTRAMUSCULAR; INTRAVENOUS; SUBCUTANEOUS ONCE
Status: COMPLETED | OUTPATIENT
Start: 2024-12-31 | End: 2024-12-31

## 2024-12-31 RX ORDER — OXYCODONE AND ACETAMINOPHEN 5; 325 MG/1; MG/1
1 TABLET ORAL EVERY 6 HOURS PRN
Qty: 10 TABLET | Refills: 0 | Status: SHIPPED | OUTPATIENT
Start: 2024-12-31 | End: 2025-01-03

## 2024-12-31 RX ORDER — OXYCODONE AND ACETAMINOPHEN 5; 325 MG/1; MG/1
1 TABLET ORAL EVERY 6 HOURS PRN
Qty: 10 TABLET | Refills: 0 | Status: SHIPPED | OUTPATIENT
Start: 2024-12-31 | End: 2024-12-31

## 2024-12-31 RX ADMIN — ONDANSETRON 4 MG: 2 INJECTION, SOLUTION INTRAMUSCULAR; INTRAVENOUS at 07:35

## 2024-12-31 RX ADMIN — MAGNESIUM CITRATE 296 ML: 1.75 LIQUID ORAL at 09:18

## 2024-12-31 RX ADMIN — FAMOTIDINE 20 MG: 10 INJECTION, SOLUTION INTRAVENOUS at 07:35

## 2024-12-31 RX ADMIN — HYDROMORPHONE HYDROCHLORIDE 0.5 MG: 1 INJECTION, SOLUTION INTRAMUSCULAR; INTRAVENOUS; SUBCUTANEOUS at 07:35

## 2024-12-31 RX ADMIN — SODIUM PHOSPHATE, DIBASIC AND SODIUM PHOSPHATE, MONOBASIC 1 ENEMA: 7; 19 ENEMA RECTAL at 17:01

## 2024-12-31 RX ADMIN — HYDROMORPHONE HYDROCHLORIDE 0.5 MG: 1 INJECTION, SOLUTION INTRAMUSCULAR; INTRAVENOUS; SUBCUTANEOUS at 12:16

## 2024-12-31 RX ADMIN — GLYCERIN 2 G: 2 SUPPOSITORY RECTAL at 10:56

## 2024-12-31 RX ADMIN — HYDROMORPHONE HYDROCHLORIDE 1 MG: 1 INJECTION, SOLUTION INTRAMUSCULAR; INTRAVENOUS; SUBCUTANEOUS at 09:14

## 2024-12-31 RX ADMIN — ONDANSETRON 4 MG: 2 INJECTION, SOLUTION INTRAMUSCULAR; INTRAVENOUS at 10:56

## 2024-12-31 RX ADMIN — SODIUM PHOSPHATE, DIBASIC AND SODIUM PHOSPHATE, MONOBASIC 1 ENEMA: 7; 19 ENEMA RECTAL at 17:02

## 2024-12-31 ASSESSMENT — PAIN SCALES - GENERAL: PAINLEVEL_OUTOF10: 8

## 2024-12-31 ASSESSMENT — PAIN - FUNCTIONAL ASSESSMENT: PAIN_FUNCTIONAL_ASSESSMENT: 0-10
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
## 2024-12-31 NOTE — DISCHARGE INSTRUCTIONS
Fleet enemas until clear.    Return to this emergency room immediately if your symptoms persist, worsen or if new ones form.    Make sure you follow-up with your primary care doctor within the next 1-2 business days.

## 2025-01-02 ENCOUNTER — HOSPITAL ENCOUNTER (EMERGENCY)
Age: 70
Discharge: HOME | End: 2025-01-02
Attending: STUDENT IN AN ORGANIZED HEALTH CARE EDUCATION/TRAINING PROGRAM
Payer: COMMERCIAL

## 2025-01-02 ENCOUNTER — APPOINTMENT (OUTPATIENT)
Dept: GENERAL RADIOLOGY | Age: 70
End: 2025-01-02
Payer: COMMERCIAL

## 2025-01-02 VITALS
OXYGEN SATURATION: 94 % | SYSTOLIC BLOOD PRESSURE: 149 MMHG | HEART RATE: 71 BPM | DIASTOLIC BLOOD PRESSURE: 63 MMHG | RESPIRATION RATE: 14 BRPM | TEMPERATURE: 97.8 F

## 2025-01-02 DIAGNOSIS — K59.00 CONSTIPATION, UNSPECIFIED CONSTIPATION TYPE: Primary | ICD-10-CM

## 2025-01-02 LAB
AMORPH SED URNS QL MICRO: ABNORMAL
BACTERIA URNS QL MICRO: ABNORMAL
BILIRUB UR QL STRIP: NEGATIVE
CLARITY UR: ABNORMAL
COLOR UR: YELLOW
EPI CELLS #/AREA URNS HPF: ABNORMAL /HPF (ref 0–5)
GLUCOSE UR STRIP-MCNC: NEGATIVE MG/DL
HGB UR QL STRIP.AUTO: ABNORMAL
KETONES UR STRIP-MCNC: NEGATIVE MG/DL
LEUKOCYTE ESTERASE UR QL STRIP: ABNORMAL
NITRITE UR QL STRIP: NEGATIVE
PH UR STRIP: 7 [PH] (ref 5–8)
PROT UR STRIP-MCNC: ABNORMAL MG/DL
RBC #/AREA URNS HPF: ABNORMAL /HPF (ref 0–2)
SP GR UR STRIP: 1.02 (ref 1–1.03)
UROBILINOGEN UR STRIP-ACNC: NORMAL EU/DL (ref 0–1)
WBC #/AREA URNS HPF: ABNORMAL /HPF (ref 0–5)

## 2025-01-02 PROCEDURE — 74018 RADEX ABDOMEN 1 VIEW: CPT

## 2025-01-02 PROCEDURE — 99284 EMERGENCY DEPT VISIT MOD MDM: CPT

## 2025-01-02 PROCEDURE — 87186 SC STD MICRODIL/AGAR DIL: CPT

## 2025-01-02 PROCEDURE — 87086 URINE CULTURE/COLONY COUNT: CPT

## 2025-01-02 PROCEDURE — 81001 URINALYSIS AUTO W/SCOPE: CPT

## 2025-01-02 PROCEDURE — 51798 US URINE CAPACITY MEASURE: CPT

## 2025-01-02 PROCEDURE — 6370000000 HC RX 637 (ALT 250 FOR IP): Performed by: STUDENT IN AN ORGANIZED HEALTH CARE EDUCATION/TRAINING PROGRAM

## 2025-01-02 PROCEDURE — 87077 CULTURE AEROBIC IDENTIFY: CPT

## 2025-01-02 RX ORDER — CEPHALEXIN 500 MG/1
500 CAPSULE ORAL 2 TIMES DAILY
Qty: 14 CAPSULE | Refills: 0 | Status: SHIPPED | OUTPATIENT
Start: 2025-01-02 | End: 2025-01-09

## 2025-01-02 RX ORDER — POLYETHYLENE GLYCOL 3350 17 G/17G
17 POWDER, FOR SOLUTION ORAL DAILY
Qty: 1530 G | Refills: 1 | Status: SHIPPED | OUTPATIENT
Start: 2025-01-02 | End: 2025-07-01

## 2025-01-02 RX ORDER — CEPHALEXIN 500 MG/1
500 CAPSULE ORAL ONCE
Status: COMPLETED | OUTPATIENT
Start: 2025-01-02 | End: 2025-01-02

## 2025-01-02 RX ORDER — DICYCLOMINE HYDROCHLORIDE 10 MG/1
20 CAPSULE ORAL ONCE
Status: COMPLETED | OUTPATIENT
Start: 2025-01-02 | End: 2025-01-02

## 2025-01-02 RX ORDER — DOCUSATE SODIUM 100 MG/1
100 CAPSULE, LIQUID FILLED ORAL ONCE
Status: COMPLETED | OUTPATIENT
Start: 2025-01-02 | End: 2025-01-02

## 2025-01-02 RX ORDER — ONDANSETRON 4 MG/1
4 TABLET, ORALLY DISINTEGRATING ORAL ONCE
Status: COMPLETED | OUTPATIENT
Start: 2025-01-02 | End: 2025-01-02

## 2025-01-02 RX ORDER — POLYETHYLENE GLYCOL 3350 17 G/17G
17 POWDER, FOR SOLUTION ORAL ONCE
Status: COMPLETED | OUTPATIENT
Start: 2025-01-02 | End: 2025-01-02

## 2025-01-02 RX ORDER — DOCUSATE SODIUM 100 MG/1
100 CAPSULE, LIQUID FILLED ORAL 2 TIMES DAILY
Qty: 60 CAPSULE | Refills: 0 | Status: SHIPPED | OUTPATIENT
Start: 2025-01-02 | End: 2025-02-01

## 2025-01-02 RX ORDER — ONDANSETRON 4 MG/1
4 TABLET, FILM COATED ORAL 3 TIMES DAILY PRN
Qty: 30 TABLET | Refills: 0 | Status: SHIPPED | OUTPATIENT
Start: 2025-01-02

## 2025-01-02 RX ADMIN — DOCUSATE SODIUM 100 MG: 100 CAPSULE, LIQUID FILLED ORAL at 05:38

## 2025-01-02 RX ADMIN — DICYCLOMINE HYDROCHLORIDE 20 MG: 10 CAPSULE ORAL at 03:13

## 2025-01-02 RX ADMIN — CEPHALEXIN 500 MG: 500 CAPSULE ORAL at 05:38

## 2025-01-02 RX ADMIN — ONDANSETRON 4 MG: 4 TABLET, ORALLY DISINTEGRATING ORAL at 03:13

## 2025-01-02 RX ADMIN — POLYETHYLENE GLYCOL 3350 17 G: 17 POWDER, FOR SOLUTION ORAL at 05:38

## 2025-01-02 ASSESSMENT — PAIN SCALES - GENERAL: PAINLEVEL_OUTOF10: 9
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
## 2025-01-05 LAB
MICROORGANISM SPEC CULT: ABNORMAL
MICROORGANISM SPEC CULT: ABNORMAL
SERVICE CMNT-IMP: ABNORMAL
SPECIMEN DESCRIPTION: ABNORMAL

## 2025-01-06 ENCOUNTER — HOSPITAL ENCOUNTER (EMERGENCY)
Age: 70
Discharge: HOME | End: 2025-01-06
Attending: EMERGENCY MEDICINE
Payer: COMMERCIAL

## 2025-01-06 ENCOUNTER — APPOINTMENT (OUTPATIENT)
Dept: CT IMAGING | Age: 70
End: 2025-01-06
Payer: COMMERCIAL

## 2025-01-06 VITALS
BODY MASS INDEX: 17.42 KG/M2 | HEART RATE: 104 BPM | RESPIRATION RATE: 18 BRPM | TEMPERATURE: 98.1 F | OXYGEN SATURATION: 95 % | SYSTOLIC BLOOD PRESSURE: 131 MMHG | WEIGHT: 132 LBS | DIASTOLIC BLOOD PRESSURE: 80 MMHG

## 2025-01-06 DIAGNOSIS — K59.00 CONSTIPATION, UNSPECIFIED CONSTIPATION TYPE: ICD-10-CM

## 2025-01-06 DIAGNOSIS — N39.0 URINARY TRACT INFECTION ASSOCIATED WITH CATHETERIZATION OF URINARY TRACT, UNSPECIFIED INDWELLING URINARY CATHETER TYPE, INITIAL ENCOUNTER (HCC): ICD-10-CM

## 2025-01-06 DIAGNOSIS — R10.84 GENERALIZED ABDOMINAL PAIN: Primary | ICD-10-CM

## 2025-01-06 DIAGNOSIS — T83.511A URINARY TRACT INFECTION ASSOCIATED WITH CATHETERIZATION OF URINARY TRACT, UNSPECIFIED INDWELLING URINARY CATHETER TYPE, INITIAL ENCOUNTER (HCC): ICD-10-CM

## 2025-01-06 LAB
ALBUMIN SERPL-MCNC: 3.3 G/DL (ref 3.5–5.2)
ALBUMIN/GLOB SERPL: 0.9 {RATIO} (ref 1–2.5)
ALP SERPL-CCNC: 149 U/L (ref 40–129)
ALT SERPL-CCNC: 7 U/L (ref 10–50)
ANION GAP SERPL CALCULATED.3IONS-SCNC: 11 MMOL/L (ref 9–16)
AST SERPL-CCNC: 12 U/L (ref 10–50)
BASOPHILS # BLD: 0.1 K/UL (ref 0–0.2)
BASOPHILS NFR BLD: 1 % (ref 0–2)
BILIRUB SERPL-MCNC: <0.2 MG/DL (ref 0–1.2)
BUN SERPL-MCNC: 19 MG/DL (ref 8–23)
CALCIUM SERPL-MCNC: 8.7 MG/DL (ref 8.8–10.2)
CHLORIDE SERPL-SCNC: 106 MMOL/L (ref 98–107)
CO2 SERPL-SCNC: 21 MMOL/L (ref 20–31)
CREAT SERPL-MCNC: 1 MG/DL (ref 0.7–1.2)
EOSINOPHIL # BLD: 0.39 K/UL (ref 0–0.44)
EOSINOPHILS RELATIVE PERCENT: 3 % (ref 1–4)
ERYTHROCYTE [DISTWIDTH] IN BLOOD BY AUTOMATED COUNT: 15.9 % (ref 11.8–14.4)
GFR, ESTIMATED: 80 ML/MIN/1.73M2
GLUCOSE SERPL-MCNC: 236 MG/DL (ref 82–115)
HCT VFR BLD AUTO: 34.5 % (ref 40.7–50.3)
HGB BLD-MCNC: 10.2 G/DL (ref 13–17)
IMM GRANULOCYTES # BLD AUTO: 0.06 K/UL (ref 0–0.3)
IMM GRANULOCYTES NFR BLD: 1 %
LACTATE BLDV-SCNC: 1.6 MMOL/L (ref 0.5–2.2)
LYMPHOCYTES NFR BLD: 2.2 K/UL (ref 1.1–3.7)
LYMPHOCYTES RELATIVE PERCENT: 18 % (ref 24–43)
MCH RBC QN AUTO: 25.8 PG (ref 25.2–33.5)
MCHC RBC AUTO-ENTMCNC: 29.6 G/DL (ref 28.4–34.8)
MCV RBC AUTO: 87.3 FL (ref 82.6–102.9)
MONOCYTES NFR BLD: 0.85 K/UL (ref 0.1–1.2)
MONOCYTES NFR BLD: 7 % (ref 3–12)
NEUTROPHILS NFR BLD: 70 % (ref 36–65)
NEUTS SEG NFR BLD: 8.57 K/UL (ref 1.5–8.1)
NRBC BLD-RTO: 0 PER 100 WBC
PLATELET # BLD AUTO: 605 K/UL (ref 138–453)
PMV BLD AUTO: 8.9 FL (ref 8.1–13.5)
POTASSIUM SERPL-SCNC: 4.5 MMOL/L (ref 3.7–5.3)
PROT SERPL-MCNC: 7 G/DL (ref 6.6–8.7)
RBC # BLD AUTO: 3.95 M/UL (ref 4.21–5.77)
RBC # BLD: ABNORMAL 10*6/UL
SODIUM SERPL-SCNC: 139 MMOL/L (ref 136–145)
WBC OTHER # BLD: 12.2 K/UL (ref 3.5–11.3)

## 2025-01-06 PROCEDURE — 85025 COMPLETE CBC W/AUTO DIFF WBC: CPT

## 2025-01-06 PROCEDURE — 2580000003 HC RX 258: Performed by: EMERGENCY MEDICINE

## 2025-01-06 PROCEDURE — 99284 EMERGENCY DEPT VISIT MOD MDM: CPT

## 2025-01-06 PROCEDURE — 96365 THER/PROPH/DIAG IV INF INIT: CPT

## 2025-01-06 PROCEDURE — 74176 CT ABD & PELVIS W/O CONTRAST: CPT

## 2025-01-06 PROCEDURE — 80053 COMPREHEN METABOLIC PANEL: CPT

## 2025-01-06 PROCEDURE — 83605 ASSAY OF LACTIC ACID: CPT

## 2025-01-06 PROCEDURE — 6370000000 HC RX 637 (ALT 250 FOR IP): Performed by: EMERGENCY MEDICINE

## 2025-01-06 PROCEDURE — 6360000002 HC RX W HCPCS: Performed by: EMERGENCY MEDICINE

## 2025-01-06 PROCEDURE — 96372 THER/PROPH/DIAG INJ SC/IM: CPT

## 2025-01-06 RX ORDER — CIPROFLOXACIN 500 MG/1
500 TABLET, FILM COATED ORAL 2 TIMES DAILY
Qty: 14 TABLET | Refills: 0 | Status: SHIPPED | OUTPATIENT
Start: 2025-01-06 | End: 2025-01-13

## 2025-01-06 RX ORDER — NITROFURANTOIN 25; 75 MG/1; MG/1
100 CAPSULE ORAL 2 TIMES DAILY
Qty: 14 CAPSULE | Refills: 0 | Status: SHIPPED | OUTPATIENT
Start: 2025-01-06 | End: 2025-01-13

## 2025-01-06 RX ORDER — DICYCLOMINE HCL 20 MG
20 TABLET ORAL EVERY 6 HOURS PRN
Qty: 20 TABLET | Refills: 0 | Status: SHIPPED | OUTPATIENT
Start: 2025-01-06 | End: 2025-01-11

## 2025-01-06 RX ORDER — SODIUM CHLORIDE, SODIUM LACTATE, POTASSIUM CHLORIDE, AND CALCIUM CHLORIDE .6; .31; .03; .02 G/100ML; G/100ML; G/100ML; G/100ML
1000 INJECTION, SOLUTION INTRAVENOUS ONCE
Status: COMPLETED | OUTPATIENT
Start: 2025-01-06 | End: 2025-01-06

## 2025-01-06 RX ORDER — MAGNESIUM HYDROXIDE/ALUMINUM HYDROXICE/SIMETHICONE 120; 1200; 1200 MG/30ML; MG/30ML; MG/30ML
30 SUSPENSION ORAL ONCE
Status: COMPLETED | OUTPATIENT
Start: 2025-01-06 | End: 2025-01-06

## 2025-01-06 RX ORDER — BUSPIRONE HYDROCHLORIDE 5 MG/1
5 TABLET ORAL 2 TIMES DAILY
Qty: 20 TABLET | Refills: 0 | Status: SHIPPED | OUTPATIENT
Start: 2025-01-06 | End: 2025-01-16

## 2025-01-06 RX ORDER — DICYCLOMINE HYDROCHLORIDE 10 MG/ML
20 INJECTION INTRAMUSCULAR ONCE
Status: COMPLETED | OUTPATIENT
Start: 2025-01-06 | End: 2025-01-06

## 2025-01-06 RX ORDER — LEVOFLOXACIN 5 MG/ML
500 INJECTION, SOLUTION INTRAVENOUS ONCE
Status: COMPLETED | OUTPATIENT
Start: 2025-01-06 | End: 2025-01-06

## 2025-01-06 RX ORDER — ALUMINUM HYDROXIDE, MAGNESIUM HYDROXIDE, SIMETHICONE 400; 400; 40 MG/10ML; MG/10ML; MG/10ML
20 SUSPENSION ORAL 2 TIMES DAILY
Qty: 148 ML | Refills: 0 | Status: SHIPPED | OUTPATIENT
Start: 2025-01-06

## 2025-01-06 RX ORDER — NITROFURANTOIN 25; 75 MG/1; MG/1
100 CAPSULE ORAL ONCE
Status: COMPLETED | OUTPATIENT
Start: 2025-01-06 | End: 2025-01-06

## 2025-01-06 RX ADMIN — LEVOFLOXACIN 500 MG: 5 INJECTION, SOLUTION INTRAVENOUS at 21:13

## 2025-01-06 RX ADMIN — SODIUM CHLORIDE, POTASSIUM CHLORIDE, SODIUM LACTATE AND CALCIUM CHLORIDE 1000 ML: 600; 310; 30; 20 INJECTION, SOLUTION INTRAVENOUS at 19:59

## 2025-01-06 RX ADMIN — ALUMINUM HYDROXIDE, MAGNESIUM HYDROXIDE, AND SIMETHICONE 30 ML: 200; 200; 20 SUSPENSION ORAL at 21:22

## 2025-01-06 RX ADMIN — NITROFURANTOIN MONOHYDRATE/MACROCRYSTALS 100 MG: 75; 25 CAPSULE ORAL at 21:13

## 2025-01-06 RX ADMIN — DICYCLOMINE HYDROCHLORIDE 20 MG: 10 INJECTION, SOLUTION INTRAMUSCULAR at 21:23

## 2025-01-07 NOTE — ED NOTES
Report called and given to RN at [facility]. Informed of prescriptions. Again informed of patients need to take Buspar for anxiety and that another prescription sent to pharmacy.

## 2025-01-07 NOTE — ED NOTES
Pt to ED c/o abdominal bloating.  Pt states that he goes between constipation and diarrhea but reports abdominal bloating.  Pt states that symptoms are chronic.  Pt states that he resides at a facility.

## 2025-01-07 NOTE — DISCHARGE INSTRUCTIONS
Simethicone may help with gas.  You may also try Bentyl.    There were 2 types of bacteria on the urine culture from 1-20 25.  I recommend use of both antibiotics.    [facility] urology follow-up is indicated given [facility] placed the stent in back in December.  Additional stent on the left side may need to be placed as well.    BuSpar is being written for home in hopes that this can be filled until insurance can verify this was a previously prescribed medication.

## 2025-01-07 NOTE — ED PROVIDER NOTES
stenosis, asymptomatic, bilateral I65.23    Lower limb amputation status Z89.619    Fx humeral neck, right, closed, initial encounter S42.211A    Transient hypotension I95.9    Constipation due to opioid therapy K59.03, T40.2X5A    Complication of below knee amputation stump ([facility]) T87.9    COPD exacerbation ([facility]) J44.1    Hyponatremia E87.1    Pain, phantom limb ([facility]) G54.6    S/P BKA (below knee amputation) bilateral ([facility]) Z89.512, Z89.511    Hyperglycemia R73.9    Moderate protein malnutrition ([facility]) E44.0    Essential hypertension I10    Uncontrolled type 2 diabetes mellitus with hyperglycemia ([facility]) E11.65    History of pulmonary embolism - 2017 Z86.711    Atelectasis J98.11    Uncontrolled type 2 diabetes mellitus with foot ulcer [id number]    Azotemia R79.89    Anemia, normocytic normochromic D64.9    Drug-induced constipation K59.03    Osteomyelitis of metatarsals of left foot ([facility]) M86.9    Diabetic foot infection ([facility]) E11.628, L08.9    Noncompliance Z91.199    Type 1 diabetes mellitus with diabetic foot infection ([facility]) E10.628, L08.9    Acute osteomyelitis of left foot M86.172    Cellulitis of left foot L03.116    Mild malnutrition ([facility]) E44.1    Hypocalcemia E83.51    Hypokalemia E87.6    Moderate malnutrition ([facility]) E44.0    History of below knee amputation, right ([facility]) Z89.511    Foot ulcer with fat layer exposed, left ([facility]) L97.522    Chronic refractory osteomyelitis of left foot M86.672    Sepsis ([facility]) A41.9    Pyogenic inflammation of bone M86.9    Cellulitis of left lower extremity L03.116    History of below knee amputation, left ([facility]) Z89.512    Leg ulcer, left, with fat layer exposed ([facility]) L97.922    S/P amputation Z89.9    Tobacco abuse Z72.0    Pneumonia of right lower lobe due to infectious organism J18.9    Abdominal pain R10.9    Cannabis abuse F12.10    Parapneumonic effusion J18.9, J91.8    Hiatus hernia -large K44.9    Metabolic encephalopathy G93.41    Altered mental status R41.82    Alcoholic

## 2025-01-08 ASSESSMENT — ENCOUNTER SYMPTOMS
ABDOMINAL PAIN: 1
CONSTIPATION: 1

## 2025-01-16 ENCOUNTER — HOSPITAL ENCOUNTER (OUTPATIENT)
Age: 70
Setting detail: SPECIMEN
Discharge: HOME | End: 2025-01-16

## 2025-01-16 LAB
ALBUMIN SERPL-MCNC: 3.3 G/DL (ref 3.5–5.2)
ALBUMIN/GLOB SERPL: 1 {RATIO} (ref 1–2.5)
ALP SERPL-CCNC: 144 U/L (ref 40–129)
ALT SERPL-CCNC: 30 U/L (ref 10–50)
AMYLASE SERPL-CCNC: 66 U/L (ref 28–100)
ANION GAP SERPL CALCULATED.3IONS-SCNC: 11 MMOL/L (ref 9–16)
AST SERPL-CCNC: 50 U/L (ref 10–50)
BASOPHILS # BLD: 0.13 K/UL (ref 0–0.2)
BASOPHILS NFR BLD: 1 % (ref 0–2)
BILIRUB SERPL-MCNC: <0.2 MG/DL (ref 0–1.2)
BUN SERPL-MCNC: 31 MG/DL (ref 8–23)
CALCIUM SERPL-MCNC: 8.7 MG/DL (ref 8.8–10.2)
CHLORIDE SERPL-SCNC: 109 MMOL/L (ref 98–107)
CHOLEST SERPL-MCNC: 92 MG/DL (ref 0–199)
CHOLESTEROL/HDL RATIO: 2
CO2 SERPL-SCNC: 19 MMOL/L (ref 20–31)
CREAT SERPL-MCNC: 1.3 MG/DL (ref 0.7–1.2)
EOSINOPHIL # BLD: 0.59 K/UL (ref 0–0.44)
EOSINOPHILS RELATIVE PERCENT: 4 % (ref 1–4)
ERYTHROCYTE [DISTWIDTH] IN BLOOD BY AUTOMATED COUNT: 17.4 % (ref 11.8–14.4)
GFR, ESTIMATED: 61 ML/MIN/1.73M2
GLUCOSE SERPL-MCNC: 259 MG/DL (ref 82–115)
HCT VFR BLD AUTO: 33.8 % (ref 40.7–50.3)
HDLC SERPL-MCNC: 47 MG/DL
HGB BLD-MCNC: 10.3 G/DL (ref 13–17)
IMM GRANULOCYTES # BLD AUTO: 0.07 K/UL (ref 0–0.3)
IMM GRANULOCYTES NFR BLD: 1 %
LDLC SERPL CALC-MCNC: 32 MG/DL (ref 0–100)
LIPASE SERPL-CCNC: 36 U/L (ref 13–60)
LYMPHOCYTES NFR BLD: 2.08 K/UL (ref 1.1–3.7)
LYMPHOCYTES RELATIVE PERCENT: 16 % (ref 24–43)
MAGNESIUM SERPL-MCNC: 2.1 MG/DL (ref 1.6–2.4)
MCH RBC QN AUTO: 26.4 PG (ref 25.2–33.5)
MCHC RBC AUTO-ENTMCNC: 30.5 G/DL (ref 28.4–34.8)
MCV RBC AUTO: 86.7 FL (ref 82.6–102.9)
MONOCYTES NFR BLD: 0.88 K/UL (ref 0.1–1.2)
MONOCYTES NFR BLD: 7 % (ref 3–12)
NEUTROPHILS NFR BLD: 71 % (ref 36–65)
NEUTS SEG NFR BLD: 9.54 K/UL (ref 1.5–8.1)
NRBC BLD-RTO: 0 PER 100 WBC
PLATELET # BLD AUTO: 464 K/UL (ref 138–453)
PMV BLD AUTO: 9.6 FL (ref 8.1–13.5)
POTASSIUM SERPL-SCNC: 4.4 MMOL/L (ref 3.7–5.3)
PROT SERPL-MCNC: 6.6 G/DL (ref 6.6–8.7)
RBC # BLD AUTO: 3.9 M/UL (ref 4.21–5.77)
RBC # BLD: ABNORMAL 10*6/UL
SODIUM SERPL-SCNC: 139 MMOL/L (ref 136–145)
TRIGL SERPL-MCNC: 64 MG/DL (ref 0–149)
VLDLC SERPL CALC-MCNC: 13 MG/DL (ref 1–30)
WBC OTHER # BLD: 13.3 K/UL (ref 3.5–11.3)

## 2025-01-16 PROCEDURE — 85025 COMPLETE CBC W/AUTO DIFF WBC: CPT

## 2025-01-16 PROCEDURE — P9603 ONE-WAY ALLOW PRORATED MILES: HCPCS

## 2025-01-16 PROCEDURE — 82150 ASSAY OF AMYLASE: CPT

## 2025-01-16 PROCEDURE — 36415 COLL VENOUS BLD VENIPUNCTURE: CPT

## 2025-01-16 PROCEDURE — 80061 LIPID PANEL: CPT

## 2025-01-16 PROCEDURE — 80053 COMPREHEN METABOLIC PANEL: CPT

## 2025-01-16 PROCEDURE — 83735 ASSAY OF MAGNESIUM: CPT

## 2025-01-16 PROCEDURE — 83690 ASSAY OF LIPASE: CPT

## 2025-03-03 ENCOUNTER — APPOINTMENT (OUTPATIENT)
Dept: CT IMAGING | Age: 70
DRG: 725 | End: 2025-03-03
Payer: MEDICARE

## 2025-03-03 ENCOUNTER — APPOINTMENT (OUTPATIENT)
Dept: GENERAL RADIOLOGY | Age: 70
DRG: 725 | End: 2025-03-03
Payer: MEDICARE

## 2025-03-03 ENCOUNTER — HOSPITAL ENCOUNTER (INPATIENT)
Age: 70
LOS: 3 days | Discharge: NURSING, REHAB OR LONG-TERM CARE | DRG: 725 | End: 2025-03-06
Attending: EMERGENCY MEDICINE | Admitting: FAMILY MEDICINE
Payer: MEDICARE

## 2025-03-03 ENCOUNTER — HOSPITAL ENCOUNTER (OUTPATIENT)
Age: 70
Setting detail: SPECIMEN
DRG: 725 | End: 2025-03-03
Payer: MEDICARE

## 2025-03-03 DIAGNOSIS — G89.4 CHRONIC PAIN SYNDROME: ICD-10-CM

## 2025-03-03 DIAGNOSIS — T83.511A URINARY TRACT INFECTION ASSOCIATED WITH INDWELLING URETHRAL CATHETER, INITIAL ENCOUNTER: ICD-10-CM

## 2025-03-03 DIAGNOSIS — R41.82 ALTERED MENTAL STATUS, UNSPECIFIED ALTERED MENTAL STATUS TYPE: Primary | ICD-10-CM

## 2025-03-03 DIAGNOSIS — J44.9 COPD WITHOUT EXACERBATION (HCC): ICD-10-CM

## 2025-03-03 DIAGNOSIS — N39.0 URINARY TRACT INFECTION ASSOCIATED WITH INDWELLING URETHRAL CATHETER, INITIAL ENCOUNTER: ICD-10-CM

## 2025-03-03 DIAGNOSIS — R19.7 DIARRHEA, UNSPECIFIED TYPE: ICD-10-CM

## 2025-03-03 LAB
ALBUMIN SERPL-MCNC: 3.2 G/DL (ref 3.5–5.2)
ALBUMIN/GLOB SERPL: 0.7 {RATIO} (ref 1–2.5)
ALP SERPL-CCNC: 152 U/L (ref 40–129)
ALT SERPL-CCNC: <5 U/L (ref 10–50)
ANION GAP SERPL CALCULATED.3IONS-SCNC: 13 MMOL/L (ref 9–16)
AST SERPL-CCNC: 13 U/L (ref 10–50)
BASOPHILS # BLD: 0.09 K/UL (ref 0–0.2)
BASOPHILS NFR BLD: 1 % (ref 0–2)
BILIRUB SERPL-MCNC: <0.2 MG/DL (ref 0–1.2)
BILIRUB UR QL STRIP: NEGATIVE
BUN SERPL-MCNC: 32 MG/DL (ref 8–23)
CALCIUM SERPL-MCNC: 8.7 MG/DL (ref 8.8–10.2)
CHLORIDE SERPL-SCNC: 109 MMOL/L (ref 98–107)
CLARITY UR: ABNORMAL
CO2 SERPL-SCNC: 17 MMOL/L (ref 20–31)
COLOR UR: YELLOW
CREAT SERPL-MCNC: 1.3 MG/DL (ref 0.7–1.2)
EOSINOPHIL # BLD: 0.33 K/UL (ref 0–0.44)
EOSINOPHILS RELATIVE PERCENT: 2 % (ref 1–4)
EPI CELLS #/AREA URNS HPF: NORMAL /HPF (ref 0–5)
ERYTHROCYTE [DISTWIDTH] IN BLOOD BY AUTOMATED COUNT: 17.7 % (ref 11.8–14.4)
GFR, ESTIMATED: 59 ML/MIN/1.73M2
GLUCOSE BLD-MCNC: 175 MG/DL (ref 75–110)
GLUCOSE SERPL-MCNC: 175 MG/DL (ref 82–115)
GLUCOSE UR STRIP-MCNC: NEGATIVE MG/DL
HCT VFR BLD AUTO: 28.4 % (ref 40.7–50.3)
HGB BLD-MCNC: 8.6 G/DL (ref 13–17)
HGB UR QL STRIP.AUTO: ABNORMAL
IMM GRANULOCYTES # BLD AUTO: 0.11 K/UL (ref 0–0.3)
IMM GRANULOCYTES NFR BLD: 1 %
INR PPP: 1
KETONES UR STRIP-MCNC: NEGATIVE MG/DL
LACTATE BLDV-SCNC: 0.7 MMOL/L (ref 0.5–1.9)
LACTATE BLDV-SCNC: 1.3 MMOL/L (ref 0.5–1.9)
LEUKOCYTE ESTERASE UR QL STRIP: ABNORMAL
LIPASE SERPL-CCNC: 17 U/L (ref 13–60)
LYMPHOCYTES NFR BLD: 2.71 K/UL (ref 1.1–3.7)
LYMPHOCYTES RELATIVE PERCENT: 19 % (ref 24–43)
MCH RBC QN AUTO: 26.5 PG (ref 25.2–33.5)
MCHC RBC AUTO-ENTMCNC: 30.3 G/DL (ref 28.4–34.8)
MCV RBC AUTO: 87.7 FL (ref 82.6–102.9)
MONOCYTES NFR BLD: 1.1 K/UL (ref 0.1–1.2)
MONOCYTES NFR BLD: 8 % (ref 3–12)
NEUTROPHILS NFR BLD: 69 % (ref 36–65)
NEUTS SEG NFR BLD: 9.93 K/UL (ref 1.5–8.1)
NITRITE UR QL STRIP: NEGATIVE
NRBC BLD-RTO: 0 PER 100 WBC
PH UR STRIP: 5.5 [PH] (ref 5–8)
PLATELET # BLD AUTO: 911 K/UL (ref 138–453)
PMV BLD AUTO: 8.6 FL (ref 8.1–13.5)
POTASSIUM SERPL-SCNC: 5 MMOL/L (ref 3.7–5.3)
PROT SERPL-MCNC: 7.5 G/DL (ref 6.6–8.7)
PROT UR STRIP-MCNC: ABNORMAL MG/DL
PROTHROMBIN TIME: 13.9 SEC (ref 11.5–14.2)
RBC # BLD AUTO: 3.24 M/UL (ref 4.21–5.77)
RBC # BLD: ABNORMAL 10*6/UL
RBC #/AREA URNS HPF: NORMAL /HPF (ref 0–2)
SODIUM SERPL-SCNC: 138 MMOL/L (ref 136–145)
SP GR UR STRIP: 1.02 (ref 1–1.03)
TROPONIN I SERPL HS-MCNC: 37 NG/L (ref 0–22)
UROBILINOGEN UR STRIP-ACNC: NORMAL EU/DL (ref 0–1)
WBC #/AREA URNS HPF: NORMAL /HPF (ref 0–5)
WBC OTHER # BLD: 14.3 K/UL (ref 3.5–11.3)

## 2025-03-03 PROCEDURE — 82947 ASSAY GLUCOSE BLOOD QUANT: CPT

## 2025-03-03 PROCEDURE — 2580000003 HC RX 258

## 2025-03-03 PROCEDURE — 85610 PROTHROMBIN TIME: CPT

## 2025-03-03 PROCEDURE — 1200000000 HC SEMI PRIVATE

## 2025-03-03 PROCEDURE — 2580000003 HC RX 258: Performed by: EMERGENCY MEDICINE

## 2025-03-03 PROCEDURE — 99223 1ST HOSP IP/OBS HIGH 75: CPT

## 2025-03-03 PROCEDURE — 6360000002 HC RX W HCPCS: Performed by: EMERGENCY MEDICINE

## 2025-03-03 PROCEDURE — 81001 URINALYSIS AUTO W/SCOPE: CPT

## 2025-03-03 PROCEDURE — 70450 CT HEAD/BRAIN W/O DYE: CPT

## 2025-03-03 PROCEDURE — 80053 COMPREHEN METABOLIC PANEL: CPT

## 2025-03-03 PROCEDURE — 93005 ELECTROCARDIOGRAM TRACING: CPT | Performed by: EMERGENCY MEDICINE

## 2025-03-03 PROCEDURE — 87040 BLOOD CULTURE FOR BACTERIA: CPT

## 2025-03-03 PROCEDURE — 84484 ASSAY OF TROPONIN QUANT: CPT

## 2025-03-03 PROCEDURE — 74022 RADEX COMPL AQT ABD SERIES: CPT

## 2025-03-03 PROCEDURE — 85025 COMPLETE CBC W/AUTO DIFF WBC: CPT

## 2025-03-03 PROCEDURE — 83690 ASSAY OF LIPASE: CPT

## 2025-03-03 PROCEDURE — 99285 EMERGENCY DEPT VISIT HI MDM: CPT

## 2025-03-03 PROCEDURE — 96374 THER/PROPH/DIAG INJ IV PUSH: CPT

## 2025-03-03 PROCEDURE — 6360000002 HC RX W HCPCS

## 2025-03-03 PROCEDURE — 2500000003 HC RX 250 WO HCPCS: Performed by: EMERGENCY MEDICINE

## 2025-03-03 PROCEDURE — 87086 URINE CULTURE/COLONY COUNT: CPT

## 2025-03-03 PROCEDURE — 83605 ASSAY OF LACTIC ACID: CPT

## 2025-03-03 RX ORDER — SODIUM CHLORIDE 0.9 % (FLUSH) 0.9 %
5-40 SYRINGE (ML) INJECTION EVERY 12 HOURS SCHEDULED
Status: DISCONTINUED | OUTPATIENT
Start: 2025-03-03 | End: 2025-03-06 | Stop reason: HOSPADM

## 2025-03-03 RX ORDER — SODIUM CHLORIDE, SODIUM LACTATE, POTASSIUM CHLORIDE, AND CALCIUM CHLORIDE .6; .31; .03; .02 G/100ML; G/100ML; G/100ML; G/100ML
30 INJECTION, SOLUTION INTRAVENOUS ONCE
Status: COMPLETED | OUTPATIENT
Start: 2025-03-03 | End: 2025-03-03

## 2025-03-03 RX ORDER — SODIUM CHLORIDE 0.9 % (FLUSH) 0.9 %
5-40 SYRINGE (ML) INJECTION PRN
Status: DISCONTINUED | OUTPATIENT
Start: 2025-03-03 | End: 2025-03-06 | Stop reason: HOSPADM

## 2025-03-03 RX ORDER — SODIUM CHLORIDE 9 MG/ML
INJECTION, SOLUTION INTRAVENOUS PRN
Status: DISCONTINUED | OUTPATIENT
Start: 2025-03-03 | End: 2025-03-06 | Stop reason: HOSPADM

## 2025-03-03 RX ORDER — HYDROCODONE BITARTRATE AND ACETAMINOPHEN 10; 325 MG/1; MG/1
1 TABLET ORAL ONCE
Status: DISCONTINUED | OUTPATIENT
Start: 2025-03-03 | End: 2025-03-06 | Stop reason: HOSPADM

## 2025-03-03 RX ADMIN — WATER 1000 MG: 1 INJECTION INTRAMUSCULAR; INTRAVENOUS; SUBCUTANEOUS at 20:11

## 2025-03-03 RX ADMIN — SODIUM CHLORIDE, POTASSIUM CHLORIDE, SODIUM LACTATE AND CALCIUM CHLORIDE 1797 ML: 600; 310; 30; 20 INJECTION, SOLUTION INTRAVENOUS at 20:08

## 2025-03-03 RX ADMIN — AZITHROMYCIN MONOHYDRATE 500 MG: 500 INJECTION, POWDER, LYOPHILIZED, FOR SOLUTION INTRAVENOUS at 22:10

## 2025-03-03 NOTE — ED NOTES
As RN into room to ask pt if he will let someone else poke, pt states \"you're an asshole\" \"you're a fat bitch and a dumb bitch\" Pt continues to be verbally abusive so RN out of room.

## 2025-03-03 NOTE — ED NOTES
Writer spoke with daughter on phone. Daughter states pt is not supposed to be taking klonopin d/t being altered after taking.

## 2025-03-03 NOTE — ED NOTES
Pt to ED by EMS from [facility]. Staff states patient became AMS after taking a dose of klonopin. Staff states pt had increased lethargy. Pt is alert and oriented to self and situation. Pt denies any pain. Pt is a double amputee BTK. Pt has dailey in placed upon arrival to ED. RR equal and nonlabored. Pt is on 2LNC which is pt baseline.

## 2025-03-03 NOTE — ED NOTES
Pt presenting to the ED with complaints of AMS. EMS reports that they just transported pt to [facility] from a bowel obstruction. EMS reports that pt was confused today after receiving Klonopin. As RN is flushing IV, pt states \"ow you do this shit on purpose\" Pt does know current events and is confused on date.
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
## 2025-03-04 LAB
ANION GAP SERPL CALCULATED.3IONS-SCNC: 9 MMOL/L (ref 9–16)
BASOPHILS # BLD: 0.08 K/UL (ref 0–0.2)
BASOPHILS NFR BLD: 1 % (ref 0–2)
BUN SERPL-MCNC: 27 MG/DL (ref 8–23)
CALCIUM SERPL-MCNC: 8.4 MG/DL (ref 8.8–10.2)
CHLORIDE SERPL-SCNC: 114 MMOL/L (ref 98–107)
CO2 SERPL-SCNC: 20 MMOL/L (ref 20–31)
CREAT SERPL-MCNC: 1.2 MG/DL (ref 0.7–1.2)
EOSINOPHIL # BLD: 0.26 K/UL (ref 0–0.44)
EOSINOPHILS RELATIVE PERCENT: 2 % (ref 1–4)
ERYTHROCYTE [DISTWIDTH] IN BLOOD BY AUTOMATED COUNT: 17.8 % (ref 11.8–14.4)
GFR, ESTIMATED: 70 ML/MIN/1.73M2
GLUCOSE BLD-MCNC: 127 MG/DL (ref 75–110)
GLUCOSE BLD-MCNC: 227 MG/DL (ref 75–110)
GLUCOSE BLD-MCNC: 230 MG/DL (ref 75–110)
GLUCOSE BLD-MCNC: 84 MG/DL
GLUCOSE BLD-MCNC: 84 MG/DL (ref 75–110)
GLUCOSE SERPL-MCNC: 116 MG/DL (ref 82–115)
HCT VFR BLD AUTO: 25.8 % (ref 40.7–50.3)
HGB BLD-MCNC: 7.6 G/DL (ref 13–17)
IMM GRANULOCYTES # BLD AUTO: 0.09 K/UL (ref 0–0.3)
IMM GRANULOCYTES NFR BLD: 1 %
LYMPHOCYTES NFR BLD: 2.52 K/UL (ref 1.1–3.7)
LYMPHOCYTES RELATIVE PERCENT: 24 % (ref 24–43)
MCH RBC QN AUTO: 26.1 PG (ref 25.2–33.5)
MCHC RBC AUTO-ENTMCNC: 29.5 G/DL (ref 28.4–34.8)
MCV RBC AUTO: 88.7 FL (ref 82.6–102.9)
MONOCYTES NFR BLD: 0.72 K/UL (ref 0.1–1.2)
MONOCYTES NFR BLD: 7 % (ref 3–12)
NEUTROPHILS NFR BLD: 66 % (ref 36–65)
NEUTS SEG NFR BLD: 6.95 K/UL (ref 1.5–8.1)
NRBC BLD-RTO: 0 PER 100 WBC
PLATELET # BLD AUTO: 807 K/UL (ref 138–453)
PMV BLD AUTO: 8.5 FL (ref 8.1–13.5)
POTASSIUM SERPL-SCNC: 4.4 MMOL/L (ref 3.7–5.3)
RBC # BLD AUTO: 2.91 M/UL (ref 4.21–5.77)
RBC # BLD: ABNORMAL 10*6/UL
SODIUM SERPL-SCNC: 142 MMOL/L (ref 136–145)
WBC OTHER # BLD: 10.6 K/UL (ref 3.5–11.3)

## 2025-03-04 PROCEDURE — 6360000002 HC RX W HCPCS: Performed by: FAMILY MEDICINE

## 2025-03-04 PROCEDURE — 6370000000 HC RX 637 (ALT 250 FOR IP)

## 2025-03-04 PROCEDURE — 94761 N-INVAS EAR/PLS OXIMETRY MLT: CPT

## 2025-03-04 PROCEDURE — 2580000003 HC RX 258

## 2025-03-04 PROCEDURE — 6360000002 HC RX W HCPCS

## 2025-03-04 PROCEDURE — 6370000000 HC RX 637 (ALT 250 FOR IP): Performed by: FAMILY MEDICINE

## 2025-03-04 PROCEDURE — 99232 SBSQ HOSP IP/OBS MODERATE 35: CPT | Performed by: FAMILY MEDICINE

## 2025-03-04 PROCEDURE — 99222 1ST HOSP IP/OBS MODERATE 55: CPT | Performed by: NURSE PRACTITIONER

## 2025-03-04 PROCEDURE — 1200000000 HC SEMI PRIVATE

## 2025-03-04 PROCEDURE — 2580000003 HC RX 258: Performed by: FAMILY MEDICINE

## 2025-03-04 PROCEDURE — 82947 ASSAY GLUCOSE BLOOD QUANT: CPT

## 2025-03-04 PROCEDURE — 2500000003 HC RX 250 WO HCPCS: Performed by: EMERGENCY MEDICINE

## 2025-03-04 PROCEDURE — 80048 BASIC METABOLIC PNL TOTAL CA: CPT

## 2025-03-04 PROCEDURE — 2500000003 HC RX 250 WO HCPCS

## 2025-03-04 PROCEDURE — 94640 AIRWAY INHALATION TREATMENT: CPT

## 2025-03-04 PROCEDURE — 85025 COMPLETE CBC W/AUTO DIFF WBC: CPT

## 2025-03-04 RX ORDER — ONDANSETRON 2 MG/ML
4 INJECTION INTRAMUSCULAR; INTRAVENOUS EVERY 6 HOURS PRN
Status: DISCONTINUED | OUTPATIENT
Start: 2025-03-04 | End: 2025-03-06 | Stop reason: HOSPADM

## 2025-03-04 RX ORDER — VITAMIN B COMPLEX
2000 TABLET ORAL DAILY
Status: DISCONTINUED | OUTPATIENT
Start: 2025-03-04 | End: 2025-03-04 | Stop reason: SDUPTHER

## 2025-03-04 RX ORDER — CLONAZEPAM 0.5 MG/1
0.5 TABLET ORAL 2 TIMES DAILY
Status: DISCONTINUED | OUTPATIENT
Start: 2025-03-04 | End: 2025-03-04

## 2025-03-04 RX ORDER — OLANZAPINE 10 MG/2ML
5 INJECTION, POWDER, FOR SOLUTION INTRAMUSCULAR EVERY 6 HOURS PRN
Status: DISCONTINUED | OUTPATIENT
Start: 2025-03-04 | End: 2025-03-04

## 2025-03-04 RX ORDER — MAGNESIUM HYDROXIDE/ALUMINUM HYDROXICE/SIMETHICONE 120; 1200; 1200 MG/30ML; MG/30ML; MG/30ML
20 SUSPENSION ORAL 2 TIMES DAILY
Status: DISCONTINUED | OUTPATIENT
Start: 2025-03-04 | End: 2025-03-06 | Stop reason: HOSPADM

## 2025-03-04 RX ORDER — ONDANSETRON 4 MG/1
4 TABLET, ORALLY DISINTEGRATING ORAL EVERY 8 HOURS PRN
Status: DISCONTINUED | OUTPATIENT
Start: 2025-03-04 | End: 2025-03-06 | Stop reason: HOSPADM

## 2025-03-04 RX ORDER — NICOTINE 21 MG/24HR
1 PATCH, TRANSDERMAL 24 HOURS TRANSDERMAL DAILY
Status: DISCONTINUED | OUTPATIENT
Start: 2025-03-04 | End: 2025-03-06 | Stop reason: HOSPADM

## 2025-03-04 RX ORDER — POLYETHYLENE GLYCOL 3350 17 G/17G
17 POWDER, FOR SOLUTION ORAL DAILY PRN
Status: DISCONTINUED | OUTPATIENT
Start: 2025-03-04 | End: 2025-03-06 | Stop reason: HOSPADM

## 2025-03-04 RX ORDER — AMITRIPTYLINE HYDROCHLORIDE 10 MG/1
10 TABLET ORAL NIGHTLY
COMMUNITY

## 2025-03-04 RX ORDER — BUSPIRONE HYDROCHLORIDE 15 MG/1
15 TABLET ORAL 3 TIMES DAILY
COMMUNITY

## 2025-03-04 RX ORDER — CLOPIDOGREL BISULFATE 75 MG/1
75 TABLET ORAL EVERY MORNING
Status: DISCONTINUED | OUTPATIENT
Start: 2025-03-04 | End: 2025-03-06 | Stop reason: HOSPADM

## 2025-03-04 RX ORDER — ALBUTEROL SULFATE 90 UG/1
2 INHALANT RESPIRATORY (INHALATION) EVERY 6 HOURS PRN
Status: DISCONTINUED | OUTPATIENT
Start: 2025-03-04 | End: 2025-03-06 | Stop reason: HOSPADM

## 2025-03-04 RX ORDER — ALBUTEROL SULFATE 90 UG/1
2 INHALANT RESPIRATORY (INHALATION) EVERY 4 HOURS PRN
Status: DISCONTINUED | OUTPATIENT
Start: 2025-03-04 | End: 2025-03-06 | Stop reason: HOSPADM

## 2025-03-04 RX ORDER — MICAFUNGIN IN SODIUM CHLORIDE 100 MG/100ML
100 INJECTION INTRAVENOUS EVERY MORNING
Status: ON HOLD | COMMUNITY
Start: 2025-03-02 | End: 2025-03-05 | Stop reason: HOSPADM

## 2025-03-04 RX ORDER — DIPHENOXYLATE HYDROCHLORIDE AND ATROPINE SULFATE 2.5; .025 MG/1; MG/1
1 TABLET ORAL EVERY 12 HOURS
Status: ON HOLD | COMMUNITY
End: 2025-03-05

## 2025-03-04 RX ORDER — INSULIN GLARGINE 100 [IU]/ML
5 INJECTION, SOLUTION SUBCUTANEOUS NIGHTLY
COMMUNITY

## 2025-03-04 RX ORDER — INSULIN LISPRO 100 [IU]/ML
0-4 INJECTION, SOLUTION INTRAVENOUS; SUBCUTANEOUS
Status: DISCONTINUED | OUTPATIENT
Start: 2025-03-04 | End: 2025-03-06 | Stop reason: HOSPADM

## 2025-03-04 RX ORDER — AMITRIPTYLINE HYDROCHLORIDE 10 MG/1
10 TABLET ORAL NIGHTLY
Status: DISCONTINUED | OUTPATIENT
Start: 2025-03-04 | End: 2025-03-06 | Stop reason: HOSPADM

## 2025-03-04 RX ORDER — PHENOL 1.4 %
1 AEROSOL, SPRAY (ML) MUCOUS MEMBRANE NIGHTLY
COMMUNITY

## 2025-03-04 RX ORDER — CLOPIDOGREL BISULFATE 75 MG/1
75 TABLET ORAL EVERY MORNING
COMMUNITY

## 2025-03-04 RX ORDER — SODIUM BICARBONATE 650 MG/1
1300 TABLET ORAL 2 TIMES DAILY
Status: DISCONTINUED | OUTPATIENT
Start: 2025-03-04 | End: 2025-03-06 | Stop reason: HOSPADM

## 2025-03-04 RX ORDER — HALOPERIDOL 5 MG/ML
5 INJECTION INTRAMUSCULAR EVERY 6 HOURS PRN
Status: DISCONTINUED | OUTPATIENT
Start: 2025-03-04 | End: 2025-03-06 | Stop reason: HOSPADM

## 2025-03-04 RX ORDER — OXYCODONE AND ACETAMINOPHEN 10; 325 MG/1; MG/1
1 TABLET ORAL EVERY 6 HOURS PRN
Status: DISCONTINUED | OUTPATIENT
Start: 2025-03-04 | End: 2025-03-06 | Stop reason: HOSPADM

## 2025-03-04 RX ORDER — POLYETHYLENE GLYCOL 3350 17 G/17G
17 POWDER, FOR SOLUTION ORAL DAILY
Status: DISCONTINUED | OUTPATIENT
Start: 2025-03-04 | End: 2025-03-06 | Stop reason: HOSPADM

## 2025-03-04 RX ORDER — INSULIN GLARGINE 100 [IU]/ML
20 INJECTION, SOLUTION SUBCUTANEOUS DAILY
Status: DISCONTINUED | OUTPATIENT
Start: 2025-03-04 | End: 2025-03-06 | Stop reason: HOSPADM

## 2025-03-04 RX ORDER — CLONAZEPAM 0.5 MG/1
0.5 TABLET ORAL 2 TIMES DAILY
Status: ON HOLD | COMMUNITY
End: 2025-03-05 | Stop reason: HOSPADM

## 2025-03-04 RX ORDER — GUAIFENESIN/DEXTROMETHORPHAN 100-10MG/5
5 SYRUP ORAL EVERY 4 HOURS PRN
Status: DISCONTINUED | OUTPATIENT
Start: 2025-03-04 | End: 2025-03-06 | Stop reason: HOSPADM

## 2025-03-04 RX ORDER — FLUCONAZOLE 200 MG/1
200 TABLET ORAL EVERY MORNING
COMMUNITY
Start: 2025-03-06 | End: 2025-03-20

## 2025-03-04 RX ORDER — GUAIFENESIN 600 MG/1
600 TABLET, EXTENDED RELEASE ORAL 2 TIMES DAILY
Status: DISCONTINUED | OUTPATIENT
Start: 2025-03-04 | End: 2025-03-04 | Stop reason: SDUPTHER

## 2025-03-04 RX ORDER — FLUTICASONE PROPIONATE AND SALMETEROL 250; 50 UG/1; UG/1
1 POWDER RESPIRATORY (INHALATION) EVERY 12 HOURS
COMMUNITY

## 2025-03-04 RX ORDER — FAMOTIDINE 20 MG/1
20 TABLET, FILM COATED ORAL 2 TIMES DAILY
Status: DISCONTINUED | OUTPATIENT
Start: 2025-03-04 | End: 2025-03-04 | Stop reason: DRUGHIGH

## 2025-03-04 RX ORDER — QUETIAPINE FUMARATE 25 MG/1
50 TABLET, FILM COATED ORAL 2 TIMES DAILY
Status: DISCONTINUED | OUTPATIENT
Start: 2025-03-04 | End: 2025-03-04

## 2025-03-04 RX ORDER — SULFAMETHOXAZOLE AND TRIMETHOPRIM 800; 160 MG/1; MG/1
1 TABLET ORAL EVERY 12 HOURS SCHEDULED
Status: DISCONTINUED | OUTPATIENT
Start: 2025-03-04 | End: 2025-03-05

## 2025-03-04 RX ORDER — IPRATROPIUM BROMIDE AND ALBUTEROL SULFATE 2.5; .5 MG/3ML; MG/3ML
1 SOLUTION RESPIRATORY (INHALATION) 3 TIMES DAILY
COMMUNITY
Start: 2025-03-03 | End: 2025-03-17

## 2025-03-04 RX ORDER — METOPROLOL TARTRATE 25 MG/1
25 TABLET, FILM COATED ORAL 2 TIMES DAILY
Status: DISCONTINUED | OUTPATIENT
Start: 2025-03-04 | End: 2025-03-06 | Stop reason: HOSPADM

## 2025-03-04 RX ORDER — FAMOTIDINE 20 MG/1
20 TABLET, FILM COATED ORAL DAILY
Status: DISCONTINUED | OUTPATIENT
Start: 2025-03-04 | End: 2025-03-06 | Stop reason: HOSPADM

## 2025-03-04 RX ORDER — SIMETHICONE 80 MG
80 TABLET,CHEWABLE ORAL EVERY 6 HOURS PRN
Status: DISCONTINUED | OUTPATIENT
Start: 2025-03-04 | End: 2025-03-06 | Stop reason: HOSPADM

## 2025-03-04 RX ORDER — INSULIN ASPART 100 [IU]/ML
2-16 INJECTION, SOLUTION INTRAVENOUS; SUBCUTANEOUS
COMMUNITY

## 2025-03-04 RX ORDER — GUAIFENESIN 600 MG/1
600 TABLET, EXTENDED RELEASE ORAL 2 TIMES DAILY
Status: DISCONTINUED | OUTPATIENT
Start: 2025-03-04 | End: 2025-03-06 | Stop reason: HOSPADM

## 2025-03-04 RX ORDER — VITAMIN B COMPLEX
2000 TABLET ORAL EVERY MORNING
Status: DISCONTINUED | OUTPATIENT
Start: 2025-03-04 | End: 2025-03-06 | Stop reason: HOSPADM

## 2025-03-04 RX ORDER — BENZONATATE 100 MG/1
100 CAPSULE ORAL 3 TIMES DAILY PRN
Status: DISCONTINUED | OUTPATIENT
Start: 2025-03-04 | End: 2025-03-06 | Stop reason: HOSPADM

## 2025-03-04 RX ORDER — DICYCLOMINE HYDROCHLORIDE 10 MG/1
10 CAPSULE ORAL
Status: DISCONTINUED | OUTPATIENT
Start: 2025-03-04 | End: 2025-03-06 | Stop reason: HOSPADM

## 2025-03-04 RX ORDER — ONDANSETRON 4 MG/1
4 TABLET, FILM COATED ORAL 3 TIMES DAILY PRN
Status: DISCONTINUED | OUTPATIENT
Start: 2025-03-04 | End: 2025-03-04 | Stop reason: SDUPTHER

## 2025-03-04 RX ORDER — BUSPIRONE HYDROCHLORIDE 5 MG/1
5 TABLET ORAL 2 TIMES DAILY
Status: DISCONTINUED | OUTPATIENT
Start: 2025-03-04 | End: 2025-03-04 | Stop reason: SDUPTHER

## 2025-03-04 RX ORDER — TRAZODONE HYDROCHLORIDE 50 MG/1
50 TABLET ORAL NIGHTLY
Status: DISCONTINUED | OUTPATIENT
Start: 2025-03-04 | End: 2025-03-06 | Stop reason: HOSPADM

## 2025-03-04 RX ORDER — BUSPIRONE HYDROCHLORIDE 5 MG/1
5 TABLET ORAL 3 TIMES DAILY
Status: DISCONTINUED | OUTPATIENT
Start: 2025-03-04 | End: 2025-03-06 | Stop reason: HOSPADM

## 2025-03-04 RX ORDER — DICYCLOMINE HYDROCHLORIDE 10 MG/1
10 CAPSULE ORAL
COMMUNITY

## 2025-03-04 RX ORDER — DICYCLOMINE HYDROCHLORIDE 10 MG/1
20 CAPSULE ORAL EVERY 6 HOURS PRN
Status: DISCONTINUED | OUTPATIENT
Start: 2025-03-04 | End: 2025-03-06 | Stop reason: HOSPADM

## 2025-03-04 RX ORDER — BUDESONIDE AND FORMOTEROL FUMARATE DIHYDRATE 160; 4.5 UG/1; UG/1
2 AEROSOL RESPIRATORY (INHALATION)
Status: DISCONTINUED | OUTPATIENT
Start: 2025-03-04 | End: 2025-03-06 | Stop reason: HOSPADM

## 2025-03-04 RX ORDER — DEXTROSE MONOHYDRATE 100 MG/ML
INJECTION, SOLUTION INTRAVENOUS CONTINUOUS PRN
Status: DISCONTINUED | OUTPATIENT
Start: 2025-03-04 | End: 2025-03-06 | Stop reason: HOSPADM

## 2025-03-04 RX ORDER — ALBUTEROL SULFATE 0.83 MG/ML
2.5 SOLUTION RESPIRATORY (INHALATION)
Status: DISCONTINUED | OUTPATIENT
Start: 2025-03-04 | End: 2025-03-04

## 2025-03-04 RX ORDER — LOPERAMIDE HYDROCHLORIDE 2 MG/1
2 CAPSULE ORAL 4 TIMES DAILY PRN
Status: DISCONTINUED | OUTPATIENT
Start: 2025-03-04 | End: 2025-03-06 | Stop reason: HOSPADM

## 2025-03-04 RX ORDER — ACETAMINOPHEN 650 MG/1
650 SUPPOSITORY RECTAL EVERY 6 HOURS PRN
Status: DISCONTINUED | OUTPATIENT
Start: 2025-03-04 | End: 2025-03-06 | Stop reason: HOSPADM

## 2025-03-04 RX ORDER — TAMSULOSIN HYDROCHLORIDE 0.4 MG/1
0.4 CAPSULE ORAL DAILY
Status: DISCONTINUED | OUTPATIENT
Start: 2025-03-04 | End: 2025-03-06 | Stop reason: HOSPADM

## 2025-03-04 RX ORDER — LACTOBACILLUS RHAMNOSUS GG 10B CELL
2 CAPSULE ORAL 2 TIMES DAILY WITH MEALS
Status: DISCONTINUED | OUTPATIENT
Start: 2025-03-04 | End: 2025-03-06 | Stop reason: HOSPADM

## 2025-03-04 RX ORDER — OXYBUTYNIN CHLORIDE 5 MG/1
5 TABLET, EXTENDED RELEASE ORAL NIGHTLY
Status: DISCONTINUED | OUTPATIENT
Start: 2025-03-04 | End: 2025-03-06 | Stop reason: HOSPADM

## 2025-03-04 RX ORDER — IPRATROPIUM BROMIDE AND ALBUTEROL SULFATE 2.5; .5 MG/3ML; MG/3ML
1 SOLUTION RESPIRATORY (INHALATION)
Status: DISCONTINUED | OUTPATIENT
Start: 2025-03-04 | End: 2025-03-04

## 2025-03-04 RX ORDER — FERROUS SULFATE 325(65) MG
325 TABLET, DELAYED RELEASE (ENTERIC COATED) ORAL
Status: DISCONTINUED | OUTPATIENT
Start: 2025-03-04 | End: 2025-03-06 | Stop reason: HOSPADM

## 2025-03-04 RX ORDER — ATORVASTATIN CALCIUM 10 MG/1
10 TABLET, FILM COATED ORAL NIGHTLY
Status: DISCONTINUED | OUTPATIENT
Start: 2025-03-04 | End: 2025-03-06 | Stop reason: HOSPADM

## 2025-03-04 RX ORDER — OXYCODONE AND ACETAMINOPHEN 10; 325 MG/1; MG/1
1 TABLET ORAL EVERY 6 HOURS PRN
Status: ON HOLD | COMMUNITY
End: 2025-03-05

## 2025-03-04 RX ORDER — ATORVASTATIN CALCIUM 10 MG/1
10 TABLET, FILM COATED ORAL NIGHTLY
COMMUNITY

## 2025-03-04 RX ORDER — ACETAMINOPHEN 325 MG/1
650 TABLET ORAL EVERY 6 HOURS PRN
Status: DISCONTINUED | OUTPATIENT
Start: 2025-03-04 | End: 2025-03-06 | Stop reason: HOSPADM

## 2025-03-04 RX ORDER — CLOTRIMAZOLE AND BETAMETHASONE DIPROPIONATE 10; .64 MG/G; MG/G
CREAM TOPICAL 2 TIMES DAILY
Status: DISCONTINUED | OUTPATIENT
Start: 2025-03-04 | End: 2025-03-06 | Stop reason: HOSPADM

## 2025-03-04 RX ORDER — HYOSCYAMINE SULFATE 0.12 MG/1
0.12 TABLET ORAL EVERY 4 HOURS PRN
COMMUNITY

## 2025-03-04 RX ORDER — FAMOTIDINE 20 MG/1
20 TABLET, FILM COATED ORAL DAILY
Status: DISCONTINUED | OUTPATIENT
Start: 2025-03-04 | End: 2025-03-04 | Stop reason: SDUPTHER

## 2025-03-04 RX ORDER — ALPRAZOLAM 0.5 MG
0.5 TABLET ORAL 4 TIMES DAILY PRN
Status: DISCONTINUED | OUTPATIENT
Start: 2025-03-04 | End: 2025-03-06 | Stop reason: HOSPADM

## 2025-03-04 RX ORDER — CHOLESTYRAMINE LIGHT 4 G/5.7G
4 POWDER, FOR SUSPENSION ORAL DAILY
Status: DISCONTINUED | OUTPATIENT
Start: 2025-03-04 | End: 2025-03-06 | Stop reason: HOSPADM

## 2025-03-04 RX ORDER — PANTOPRAZOLE SODIUM 40 MG/1
40 TABLET, DELAYED RELEASE ORAL
Status: DISCONTINUED | OUTPATIENT
Start: 2025-03-05 | End: 2025-03-06 | Stop reason: HOSPADM

## 2025-03-04 RX ORDER — DOXYCYCLINE HYCLATE 100 MG
100 TABLET ORAL 2 TIMES DAILY
COMMUNITY

## 2025-03-04 RX ORDER — OMEPRAZOLE 20 MG/1
20 CAPSULE, DELAYED RELEASE ORAL EVERY MORNING
COMMUNITY

## 2025-03-04 RX ADMIN — MICONAZOLE NITRATE: 20 POWDER TOPICAL at 21:12

## 2025-03-04 RX ADMIN — QUETIAPINE FUMARATE 50 MG: 25 TABLET ORAL at 21:15

## 2025-03-04 RX ADMIN — DICYCLOMINE HYDROCHLORIDE 10 MG: 10 CAPSULE ORAL at 21:14

## 2025-03-04 RX ADMIN — OXYCODONE AND ACETAMINOPHEN 1 TABLET: 325; 10 TABLET ORAL at 21:16

## 2025-03-04 RX ADMIN — SODIUM ZIRCONIUM CYCLOSILICATE 10 G: 10 POWDER, FOR SUSPENSION ORAL at 17:35

## 2025-03-04 RX ADMIN — MICAFUNGIN SODIUM 100 MG: 100 INJECTION, POWDER, LYOPHILIZED, FOR SOLUTION INTRAVENOUS at 15:40

## 2025-03-04 RX ADMIN — GUAIFENESIN 600 MG: 600 TABLET ORAL at 21:14

## 2025-03-04 RX ADMIN — SODIUM CHLORIDE, PRESERVATIVE FREE 10 ML: 5 INJECTION INTRAVENOUS at 15:30

## 2025-03-04 RX ADMIN — SULFAMETHOXAZOLE AND TRIMETHOPRIM 1 TABLET: 800; 160 TABLET ORAL at 21:15

## 2025-03-04 RX ADMIN — OLANZAPINE 5 MG: 10 INJECTION, POWDER, FOR SOLUTION INTRAMUSCULAR at 02:12

## 2025-03-04 RX ADMIN — DICYCLOMINE HYDROCHLORIDE 10 MG: 10 CAPSULE ORAL at 17:33

## 2025-03-04 RX ADMIN — FAMOTIDINE 20 MG: 20 TABLET, FILM COATED ORAL at 11:00

## 2025-03-04 RX ADMIN — SODIUM CHLORIDE, PRESERVATIVE FREE 5 ML: 5 INJECTION INTRAVENOUS at 02:00

## 2025-03-04 RX ADMIN — BUSPIRONE HYDROCHLORIDE 5 MG: 5 TABLET ORAL at 21:15

## 2025-03-04 RX ADMIN — SODIUM CHLORIDE, PRESERVATIVE FREE 10 ML: 5 INJECTION INTRAVENOUS at 21:16

## 2025-03-04 RX ADMIN — INSULIN LISPRO 1 UNITS: 100 INJECTION, SOLUTION INTRAVENOUS; SUBCUTANEOUS at 21:12

## 2025-03-04 RX ADMIN — CLOTRIMAZOLE AND BETAMETHASONE DIPROPIONATE: 10; .5 CREAM TOPICAL at 21:16

## 2025-03-04 RX ADMIN — OXYCODONE AND ACETAMINOPHEN 1 TABLET: 325; 10 TABLET ORAL at 13:59

## 2025-03-04 RX ADMIN — WATER 1000 MG: 1 INJECTION INTRAMUSCULAR; INTRAVENOUS; SUBCUTANEOUS at 21:14

## 2025-03-04 RX ADMIN — OXYBUTYNIN CHLORIDE 5 MG: 5 TABLET, EXTENDED RELEASE ORAL at 21:15

## 2025-03-04 RX ADMIN — ATORVASTATIN CALCIUM 10 MG: 10 TABLET, FILM COATED ORAL at 21:14

## 2025-03-04 RX ADMIN — HALOPERIDOL LACTATE 5 MG: 5 INJECTION, SOLUTION INTRAMUSCULAR at 02:37

## 2025-03-04 RX ADMIN — PANCRELIPASE LIPASE, PANCRELIPASE PROTEASE, PANCRELIPASE AMYLASE 5000 UNITS: 5000; 17000; 24000 CAPSULE, DELAYED RELEASE ORAL at 17:34

## 2025-03-04 RX ADMIN — CLOPIDOGREL BISULFATE 75 MG: 75 TABLET ORAL at 13:58

## 2025-03-04 RX ADMIN — CHOLESTYRAMINE 4 G: 4 POWDER, FOR SUSPENSION ORAL at 17:32

## 2025-03-04 RX ADMIN — TAMSULOSIN HYDROCHLORIDE 0.4 MG: 0.4 CAPSULE ORAL at 17:33

## 2025-03-04 RX ADMIN — FERROUS SULFATE TAB EC 325 MG (65 MG FE EQUIVALENT) 325 MG: 325 (65 FE) TABLET DELAYED RESPONSE at 17:32

## 2025-03-04 RX ADMIN — CLONAZEPAM 0.5 MG: 0.5 TABLET ORAL at 13:59

## 2025-03-04 RX ADMIN — METOPROLOL TARTRATE 25 MG: 25 TABLET, FILM COATED ORAL at 21:16

## 2025-03-04 RX ADMIN — DICYCLOMINE HYDROCHLORIDE 20 MG: 10 CAPSULE ORAL at 13:58

## 2025-03-04 RX ADMIN — SODIUM BICARBONATE 1300 MG: 650 TABLET ORAL at 21:14

## 2025-03-04 RX ADMIN — INSULIN LISPRO 2 UNITS: 100 INJECTION, SOLUTION INTRAVENOUS; SUBCUTANEOUS at 17:32

## 2025-03-04 RX ADMIN — ALPRAZOLAM 0.5 MG: 0.5 TABLET ORAL at 10:59

## 2025-03-04 RX ADMIN — PANCRELIPASE LIPASE, PANCRELIPASE PROTEASE, PANCRELIPASE AMYLASE 20000 UNITS: 20000; 63000; 84000 CAPSULE, DELAYED RELEASE ORAL at 17:34

## 2025-03-04 RX ADMIN — BUSPIRONE HYDROCHLORIDE 15 MG: 10 TABLET ORAL at 13:59

## 2025-03-04 RX ADMIN — Medication 2000 UNITS: at 13:59

## 2025-03-04 RX ADMIN — BUDESONIDE AND FORMOTEROL FUMARATE DIHYDRATE 2 PUFF: 160; 4.5 AEROSOL RESPIRATORY (INHALATION) at 22:22

## 2025-03-04 RX ADMIN — APIXABAN 5 MG: 5 TABLET, FILM COATED ORAL at 21:15

## 2025-03-04 RX ADMIN — TRAZODONE HYDROCHLORIDE 50 MG: 50 TABLET ORAL at 21:16

## 2025-03-04 RX ADMIN — Medication 2 CAPSULE: at 17:33

## 2025-03-04 RX ADMIN — AMITRIPTYLINE HYDROCHLORIDE 10 MG: 10 TABLET, FILM COATED ORAL at 21:13

## 2025-03-04 RX ADMIN — ALBUTEROL SULFATE 2 PUFF: 90 AEROSOL, METERED RESPIRATORY (INHALATION) at 11:11

## 2025-03-04 RX ADMIN — GUAIFENESIN AND DEXTROMETHORPHAN 5 ML: 100; 10 SYRUP ORAL at 02:15

## 2025-03-04 RX ADMIN — GUAIFENESIN 600 MG: 600 TABLET ORAL at 02:14

## 2025-03-04 RX ADMIN — GUAIFENESIN 600 MG: 600 TABLET ORAL at 10:59

## 2025-03-04 RX ADMIN — SULFAMETHOXAZOLE AND TRIMETHOPRIM 1 TABLET: 800; 160 TABLET ORAL at 10:59

## 2025-03-04 RX ADMIN — AZITHROMYCIN MONOHYDRATE 500 MG: 500 INJECTION, POWDER, LYOPHILIZED, FOR SOLUTION INTRAVENOUS at 22:09

## 2025-03-04 ASSESSMENT — PAIN DESCRIPTION - LOCATION
LOCATION: ABDOMEN
LOCATION: ABDOMEN
LOCATION: ABDOMEN;FLANK
LOCATION: ABDOMEN;FLANK

## 2025-03-04 ASSESSMENT — PAIN SCALES - GENERAL
PAINLEVEL_OUTOF10: 6
PAINLEVEL_OUTOF10: 9
PAINLEVEL_OUTOF10: 10
PAINLEVEL_OUTOF10: 7
PAINLEVEL_OUTOF10: 6
PAINLEVEL_OUTOF10: 4
PAINLEVEL_OUTOF10: 0

## 2025-03-04 ASSESSMENT — ENCOUNTER SYMPTOMS
RHINORRHEA: 0
VOICE CHANGE: 0
SHORTNESS OF BREATH: 1
SINUS PRESSURE: 0
COUGH: 0
ABDOMINAL PAIN: 1
VOMITING: 0
WHEEZING: 0
CHEST TIGHTNESS: 0
DIARRHEA: 0
CHOKING: 0
NAUSEA: 0
BACK PAIN: 0
CONSTIPATION: 0

## 2025-03-04 ASSESSMENT — PAIN DESCRIPTION - DESCRIPTORS
DESCRIPTORS: CRAMPING
DESCRIPTORS: ACHING;SQUEEZING;SPASM
DESCRIPTORS: SHARP;SHOOTING

## 2025-03-04 ASSESSMENT — PAIN DESCRIPTION - ORIENTATION
ORIENTATION: MID
ORIENTATION: RIGHT;LEFT

## 2025-03-04 ASSESSMENT — PAIN - FUNCTIONAL ASSESSMENT: PAIN_FUNCTIONAL_ASSESSMENT: 0-10

## 2025-03-04 NOTE — PROGRESS NOTES
[facility]  Office: [phone]  Baldomero Maldonado DO, Luis Valdivia DO, Eric Rojas DO, Rajinder Chiu DO, Erica Daugherty MD, Carolina Hernandez MD, Haleigh Sorto MD, Hannah Blum MD,  Isael Velazco MD, Cora Coughlin MD, Alex Ferrell MD,  Chela Doan DO, Trent Kearns MD, Manolo Potter MD, Hans Maldonado DO, Mei Tolbert MD,  Gianni De La Rosa DO, Aranza Wakefield MD, Magdalena Dill MD, Mary Ann Cheek MD, Jenny Williamson MD,  Panfilo Hall MD, Dorcas Gillis MD, Nato Gunter MD, Opal Davis MD, Red Small MD, Giana Larsen MD, Trenton Wong DO, Joe Franklin MD, Chela Whitney MD, Mohsin Reza, MD, Shirley Waterhouse, CNP,  Xnea Harrell CNP, Trenton Campbell, CNP,  Celena Corona, ANIYAH, Terrie Mathur, CNP, Dalia Lopes, CNP, Chiquita Manuel, CNP, Anh Kolb CNP, AMITA Casanova-C, Gris Thorpe, CNP, eRymundo Todd, CNP,  Cristal Padilla, CNP, Annette Kimble, CNP, Elizabeth Clemente, CNP,  Lea Fletcher, CNS, Kelin Hernandez CNP, Norma Soliman CNP,   Lima Boyle, CNP       [facility]      Daily Progress Note     Admit Date: 3/3/2025  Bed/Room No.  STA16/16  Admitting Physician : Haleigh Sorto MD  Code Status :Full Code  Hospital Day:  LOS: 1 day   Chief Complaint:     Chief Complaint   Patient presents with    Altered Mental Status     Principal Problem:    Complicated UTI (urinary tract infection)  Active Problems:    Type 2 diabetes mellitus with diabetic polyneuropathy, with long-term current use of insulin (HCC)    Esophageal cancer (HCC)    DM type 2 with diabetic peripheral neuropathy (HCC)    COPD without exacerbation (HCC)    HLD (hyperlipidemia)    Gastroesophageal reflux disease without esophagitis    Chronic pain syndrome    PAD (peripheral artery disease)    S/P BKA (below knee amputation) bilateral (HCC)    Essential hypertension    History of pulmonary embolism - 2017    Anemia, normocytic normochromic    Drug-induced constipation     Moderate malnutrition    Metabolic encephalopathy    Chronic diarrhea    Secondary hypercoagulable state    Chronic pancreatitis (HCC)    Community acquired pneumonia  Resolved Problems:    * No resolved hospital problems. *    Subjective :        Interval History/Significant events :  03/04/25    Patient is angry. He is c/o pain all over the body. He reports abdominal pain, joint pains . Patient is requesting Dilaudid and morphine. He also reports pain in urethra secondary to Indwelling catheter. He denies any diarrhea. He is breathing on room air, she reports that breathing was worse on presentation and has wheeze and cough.   Vitals - stable , afebrile.   Labs / test results -  leucocytosis improved.   Low HGB   MOD leuko-esterase , 3 + HGB in urine   CT head reviewed   KUB reviewed     5 mm stone and R ureteral stent in place     Nursing notes , Consults notes reviewed. Overnight events and updates discussed with Nursing staff .   Background History:         Nicolas Cardenas is 69 y.o. male  Who was admitted to the hospital on 3/3/2025 for treatment of Complicated UTI (urinary tract infection).   Patient presented to emergency room from skilled nursing facility with generalized abdominal pain, suprapubic cramping, lethargic, and discolored urine for 2 days.  Patient has history of chronic urinary retention with indwelling urinary catheter.  He was recently hospitalized for Candida glabrata bacteremia and treated with micafungin with stop date 3/5/2025 followed by oral fluconazole 2 more weeks.  He also had recent influenza A about a week ago treated with Tamiflu  Workup in emergency room showed leukocytosis, normal lactic acid, creatinine 1.3, hemoglobin 8.6.  Patient was also hypoxic and required 2 L of oxygen.  Other comorbidities include recurrent kidney stone, balanitis treated with meatal dilation, bilateral BKA, NIDDM, COPD, alcohol abuse, chronic pancreatitis and pancreatic insufficiency, chronic

## 2025-03-04 NOTE — RT PROTOCOL NOTE
RT Inhaler-Nebulizer Bronchodilator Protocol Note    There is a bronchodilator order in the chart from a provider indicating to follow the RT Bronchodilator Protocol and there is an “Initiate RT Inhaler-Nebulizer Bronchodilator Protocol” order as well (see protocol at bottom of note).    CXR Findings:  No results found.    The findings from the last RT Protocol Assessment were as follows:   History Pulmonary Disease: Chronic pulmonary disease  Respiratory Pattern: Regular pattern and RR 12-20 bpm  Breath Sounds: Clear breath sounds  Cough: Strong, spontaneous, non-productive  Indication for Bronchodilator Therapy: On home bronchodilators  Bronchodilator Assessment Score: 2    Aerosolized bronchodilator medication orders have been revised according to the RT Inhaler-Nebulizer Bronchodilator Protocol below.    Respiratory Therapist to perform RT Therapy Protocol Assessment initially then follow the protocol.  Repeat RT Therapy Protocol Assessment PRN for score 0-3 or on second treatment, BID, and PRN for scores above 3.    No Indications - adjust the frequency to every 6 hours PRN wheezing or bronchospasm, if no treatments needed after 48 hours then discontinue using Per Protocol order mode.     If indication present, adjust the RT bronchodilator orders based on the Bronchodilator Assessment Score as indicated below.  Use Inhaler orders unless patient has one or more of the following: on home nebulizer, not able to hold breath for 10 seconds, is not alert and oriented, cannot activate and use MDI correctly, or respiratory rate 25 breaths per minute or more, then use the equivalent nebulizer order(s) with same Frequency and PRN reasons based on the score.  If a patient is on this medication at home then do not decrease Frequency below that used at home.    0-3 - enter or revise RT bronchodilator order(s) to equivalent RT Bronchodilator order with Frequency of every 4 hours PRN for wheezing or increased work of  breathing using Per Protocol order mode.        4-6 - enter or revise RT Bronchodilator order(s) to two equivalent RT bronchodilator orders with one order with BID Frequency and one order with Frequency of every 4 hours PRN wheezing or increased work of breathing using Per Protocol order mode.        7-10 - enter or revise RT Bronchodilator order(s) to two equivalent RT bronchodilator orders with one order with TID Frequency and one order with Frequency of every 4 hours PRN wheezing or increased work of breathing using Per Protocol order mode.       11-13 - enter or revise RT Bronchodilator order(s) to one equivalent RT bronchodilator order with QID Frequency and an Albuterol order with Frequency of every 4 hours PRN wheezing or increased work of breathing using Per Protocol order mode.      Greater than 13 - enter or revise RT Bronchodilator order(s) to one equivalent RT bronchodilator order with every 4 hours Frequency and an Albuterol order with Frequency of every 2 hours PRN wheezing or increased work of breathing using Per Protocol order mode.     RT to enter RT Home Evaluation for COPD & MDI Assessment order using Per Protocol order mode.    Electronically signed by BRENDA ODONNELL RCP on 3/4/2025 at 11:28 AM

## 2025-03-04 NOTE — RT PROTOCOL NOTE
breathing using Per Protocol order mode.        4-6 - enter or revise RT Bronchodilator order(s) to two equivalent RT bronchodilator orders with one order with BID Frequency and one order with Frequency of every 4 hours PRN wheezing or increased work of breathing using Per Protocol order mode.        7-10 - enter or revise RT Bronchodilator order(s) to two equivalent RT bronchodilator orders with one order with TID Frequency and one order with Frequency of every 4 hours PRN wheezing or increased work of breathing using Per Protocol order mode.       11-13 - enter or revise RT Bronchodilator order(s) to one equivalent RT bronchodilator order with QID Frequency and an Albuterol order with Frequency of every 4 hours PRN wheezing or increased work of breathing using Per Protocol order mode.      Greater than 13 - enter or revise RT Bronchodilator order(s) to one equivalent RT bronchodilator order with every 4 hours Frequency and an Albuterol order with Frequency of every 2 hours PRN wheezing or increased work of breathing using Per Protocol order mode.     RT to enter RT Home Evaluation for COPD & MDI Assessment order using Per Protocol order mode.    Electronically signed by MARTIR WORLEY JR, RCP on 3/4/2025 at 2:15 AMRT Nebulizer Bronchodilator Protocol Note    There is a bronchodilator order in the chart from a provider indicating to follow the RT Bronchodilator Protocol and there is an “Initiate RT Bronchodilator Protocol” order as well (see protocol at bottom of note).    CXR Findings:  No results found.    The findings from the last RT Protocol Assessment were as follows:  Smoking: Chronic pulmonary disease  Respiratory Pattern: Regular pattern and RR 12-20 bpm  Breath Sounds: Clear breath sounds  Cough: Strong, spontaneous, non-productive  Indication for Bronchodilator Therapy: On home bronchodilators  Bronchodilator Assessment Score: 2    Aerosolized bronchodilator medication orders have been revised according to  Assessment order using Per Protocol order mode.    Electronically signed by MARTIR WORLEY JR, RCP on 3/4/2025 at 2:15 AM

## 2025-03-04 NOTE — CONSULTS
[facility]  Initial Consult Note  Date: 3/4/2025    Hospital day :1     Impression:   Recent Candida glabrata fungemia secondary to complicated urinary tract infection, 2/15/2025  Patient to complete micafungin therapy 3/5/2025 then transition to fluconazole 200 mg daily x 14 days per previous plan from [facility]  Linear opacity in the left lung base concerning for pneumonia  Recent influenza A respiratory infection, 2/24/2025  Status post Tamiflu x 5 days  Chronic kidney disease stage IIIa  Diabetes mellitus type 2  Chronic venous insufficiency status post bilateral BKA  Chronic pancreatitis  History of C. difficile infection, most recent September 2024    Recommendations   Patient has been started on IV ceftriaxone and azithromycin  Clinically, I doubt the patient has an active urinary tract infection or pneumonia, though I will review charting from recent hospitalization at [facility] as well as imaging  Blood cultures have been sent  Urine culture has been sent  We will continue to follow clinical progress and adjust therapy accordingly    Chief complaint/reason for consultation:   UTI/CAP    History of Present Illness:   Nicolas Cardenas is a 69 y.o.-year-old male who was initially admitted on 3/3/2025.  He also has a history of diabetes mellitus type 2, peripheral vascular disease status post bilateral below the knee amputation, chronic kidney disease, COPD, C. difficile-most recent episode in September 2024.    Patient is known to our service from multiple hospitalizations in the past.  He does have chronic urinary retention status post chronic indwelling Quesada catheter placement.  He has also had bilateral ureteral stents that were recently replaced.      In December 2024 he had a left ureteral calculus and underwent left-sided stent placement.  Culture grew Candida albicans at that time.  Patient January 2025 he was admitted with abdominal pain, found to have a right ureteral calculus that was 1  with Friends and Family: Once a week     Frequency of Social Gatherings with Friends and Family: Once a week     Attends Yarsani Services: Never     Active Member of Clubs or Organizations: No     Attends Club or Organization Meetings: Never     Marital Status:    Intimate Partner Violence: Unknown (2025)    Received from [facility]    Humiliation, Afraid, Rape, and Kick questionnaire     Fear of Current or Ex-Partner: Patient unable to answer     Emotionally Abused: No     Physically Abused: No     Sexually Abused: No   Housing Stability: High Risk (3/4/2025)    Housing Stability Vital Sign     Unable to Pay for Housing in the Last Year: No     Number of Times Moved in the Last Year: 2     Homeless in the Last Year: No     Family History:     Family History   Problem Relation Age of Onset    Diabetes Mother     Cancer Mother     Alcohol Abuse Father     Cancer Sister     Alcohol Abuse Maternal Aunt     Alcohol Abuse Maternal Uncle     Alcohol Abuse Paternal Aunt       Allergies:   Ativan [lorazepam], Gabapentin, and Other     Review of Systems:   General: No fevers or chills.  Eyes: No double vision or blurry vision.  ENT: No sore throat or runny nose.  Cardiovascular: No chest pain or palpitations.  Lung: No shortness of breath or cough.  Abdomen:  abdominal pain. constipation  Genitourinary: No increased urinary frequency, or dysuria.  Musculoskeletal: No muscle aches or pains.  Hematologic: No bleeding or bruising.  Neurologic: No headache, weakness, numbness, or tingling.    Physical Examination :   /63   Pulse 85   Temp 97.5 °F (36.4 °C) (Oral)   Resp 20   Ht 1.829 m (6')   Wt 54.2 kg (119 lb 8 oz)   SpO2 92%   BMI 16.21 kg/m²     Temperature Range: Temp: 97.5 °F (36.4 °C) Temp  Av.5 °F (36.4 °C)  Min: 97.5 °F (36.4 °C)  Max: 97.5 °F (36.4 °C)  General Appearance: Awake, alert, and in no apparent distress  Head: Normocephalic, without obvious abnormality,  Blood Updated: 03/04/25 1012     Specimen Description .BLOOD L HAND 10ML     Special Requests          Culture NO GROWTH 12 HOURS    Culture, Blood 2 [[id number]] Collected: 03/03/25 1815    Order Status: Completed Specimen: Blood Updated: 03/04/25 1008     Specimen Description .BLOOD R HAND 2ML     Special Requests          Culture NO GROWTH 12 HOURS    Culture, Urine [[id number]] Collected: 03/03/25 1803    Order Status: Sent Specimen: Urine, clean catch Updated: 03/04/25 0110            Electronically signed by MARCELO ROMAN CNP on 3/4/2025 at 6:19 PM      [facility]  MARCELO ROMAN CNP  [employer] messaging  OFFICE: [phone]    Thank you for allowing us to participate in the care of this patient. Please call with questions.     This note is created with the assistance of a speech recognition program.  While intending to generate a document that actually reflects the content of the visit, the document can still have some errors including those of syntax and sound a like substitutions which may escape proof reading.  In such instances, actual meaning can be extrapolated by contextual diversion.
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
## 2025-03-04 NOTE — PROGRESS NOTES
Pharmacy Medication Review    The patient's list of current home medications has been reviewed.     PHYSICIANS AND NURSE PRACTITIONERS: please note there is no Transitions of Care/Med Rec Pharmacist available to address any discrepancies on inpatient orders. It is the responsibility of the attending provider to review the updates made to the home med list and adjust inpatient orders as appropriate.        Source(s) of information:nursing facility: [facility] and [facility] active medication list as of 033/2025 patient admitted 2/28/2025        Based on information provided by the above source(s), I have updated the patient's home med list as described below.     Removed   Cholecalciferol (VITAMIN D3) 50 MCG (2000 UT) CAPS  cholestyramine light 4 g packet   guaiFENesin (MUCINEX) 600 MG extended release tablet   oxyBUTYnin (DITROPAN-XL) 5 MG extended release tablet   sodium bicarbonate 650 MG tablet   nicotine (NICODERM CQ) 14 MG/24HR  traZODone (DESYREL) 50 MG tablet   busPIRone (BUSPAR) 5 MG tablet   glucose 4 g chewable tablet  QUEtiapine (SEROQUEL) 50 MG tablet   clotrimazole-betamethasone (LOTRISONE) 1-0.05 % cr   miconazole (MICOTIN) 2 % powder   metoprolol tartrate (LOPRESSOR) 25 MG tablet   apixaban (ELIQUIS) 5 MG TABS tablet  sodium zirconium cyclosilicate) 10 g oral suspension   ondansetron (ZOFRAN) 4 MG tablet  polyethylene glycol (GLYCOLAX) 17 GM/SCOOP powder   magnesium citrate solution      Added fluticasone-salmeterol (ADVAIR DISKUS) 250-50 MCG/ACT AEPB diskus inhaler   amitriptyline (ELAVIL) 10 MG tablet   busPIRone (BUSPAR) 15 MG tablet   clonazePAM (KLONOPIN) 0.5 MG tablet   dicyclomine (BENTYL) 10 MG capsule   diphenoxylate-atropine (LOMOTIL) 2.5-0.025 MG per tablet   doxycycline hyclate (VIBRA-TABS) 100 MG tablet   fluconazole (DIFLUCAN) 200 MG tablet   hyoscyamine (ANASPAZ;LEVSIN) 0.125 MG tablet   ipratropium 0.5 mg-albuterol 2.5 mg (DUONEB) 0.5-2.5 (3) MG/3ML SOLN nebulizer solution  20 MG tablet    Take 1 tablet by mouth every morning   simethicone (MYLICON) 80 MG chewable tablet Take 1 tablet by mouth every 6 hours as needed for Flatulence     lactobacillus (CULTURELLE) capsule Take 1 capsule by mouth every morning   umeclidinium bromide (INCRUSE ELLIPTA) 62.5 MCG/ACT inhaler Inhale 1 puff into the lungs every morning   tamsulosin (FLOMAX) 0.4 MG capsule Take 1 capsule by mouth at bedtime   lipase-protease-amylase (CREON) 20648-00733 units delayed release capsule Take 3 capsules by mouth 3 times daily (with meals)   ferrous sulfate (IRON 325) 325 (65 Fe) MG tablet Take 1 tablet by mouth daily (with breakfast)   albuterol sulfate HFA (VENTOLIN HFA) 108 (90 Base) MCG/ACT inhaler Inhale 2 puffs into the lungs every 4 hours as needed for Wheezing or Shortness of Breath

## 2025-03-04 NOTE — ED NOTES
Pt has Lt. Upper arm PICC from [facility] with drsg dated 2/28. Unable to draw back and difficult to flush with 10ml NS.

## 2025-03-04 NOTE — PROGRESS NOTES
Spoke with Marisela STEWARD regarding Buspar being tapered down to 5 mg BID and Klonopin being discontinued. States patient had similar AMS previously when on Klonopin and is concerned this may be contributing to this now. Dr. Sorto notified see orders.

## 2025-03-04 NOTE — ED NOTES
ED TO INPATIENT SBAR HANDOFF    Patient Name: Nicolas Cardenas   :  1955  69 y.o.   MRN:  [id number]  Preferred Name  nicolas  ED Room #:  STA16/16  Family/Caregiver Present no   Restraints no   Sitter no   Sepsis Risk Score      Situation  Code Status: Full Code No additional code details.    Allergies: Ativan [lorazepam], Gabapentin, and Other  Weight: Patient Vitals for the past 96 hrs (Last 3 readings):   Weight   25 1757 59.9 kg (132 lb)     Arrived from: nursing home  Chief Complaint:   Chief Complaint   Patient presents with    Altered Mental Status     Hospital Problem/Diagnosis:  Principal Problem:    Complicated UTI (urinary tract infection)  Active Problems:    Type 2 diabetes mellitus with diabetic polyneuropathy, with long-term current use of insulin (HCC)    Esophageal cancer (HCC)    DM type 2 with diabetic peripheral neuropathy (HCC)    COPD without exacerbation (HCC)    HLD (hyperlipidemia)    Gastroesophageal reflux disease without esophagitis    Chronic pain syndrome    PAD (peripheral artery disease)    S/P BKA (below knee amputation) bilateral (HCC)    Essential hypertension    History of pulmonary embolism -     Anemia, normocytic normochromic    Drug-induced constipation    Moderate malnutrition    Metabolic encephalopathy    Chronic diarrhea    Secondary hypercoagulable state    Chronic pancreatitis (HCC)    Community acquired pneumonia  Resolved Problems:    * No resolved hospital problems. *    Imaging:   CT head without contrast   Final Result   1. No acute intracranial findings.   2. Small area of encephalomalacia in the right frontal lobe which is   unchanged and could represent an old infarct or traumatic injury.   3. Mucosal thickening and air-fluid level in the right maxillary sinus which   could represent acute sinusitis.         XR ACUTE ABD SERIES CHEST 1 VW   Final Result   1. Mild patchy and linear opacities at the left lung base could be related   atelectasis or

## 2025-03-04 NOTE — ED NOTES
Report given to Brii BONNER.   Burow's Advancement Flap Text: The defect edges were debeveled with a #15 scalpel blade.  Given the location of the defect and the proximity to free margins a Burow's advancement flap was deemed most appropriate.  Using a sterile surgical marker, the appropriate advancement flap was drawn incorporating the defect and placing the expected incisions within the relaxed skin tension lines where possible.    The area thus outlined was incised deep to adipose tissue with a #15 scalpel blade.  The skin margins were undermined to an appropriate distance in all directions utilizing iris scissors.
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
## 2025-03-04 NOTE — ED NOTES
Marisela/daughter called and requested an update. Dr. Estevez given update from daughter regarding Pt was admitted to [email or web address] and discharged to [facility]. Pt was sent again to [email or web address] and discharge back to [facility]. Daughter requested [facility] to transport Pt to [facility] for today's visit. Daughter states Pt has had reaction to Klonopin in the past. Pt has recent hx of pneumonia and UTI. Pt has had stent and dailey since February due to kidney stones.
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
## 2025-03-04 NOTE — ED PROVIDER NOTES
FACULTY SIGN-OUT  ADDENDUM     Care of this patient was assumed from Dr. Slaughter.  The patient was seen for Altered Mental Status  .  The patient's initial evaluation and plan have been discussed with the prior provider who initially evaluated the patient.  Nursing Notes, Past Medical Hx, Past Surgical Hx, Social Hx, Allergies, and Family Hx were all reviewed.      ED COURSE      The patient was given the following medications:  Orders Placed This Encounter   Medications    sodium chloride flush 0.9 % injection 5-40 mL    sodium chloride flush 0.9 % injection 5-40 mL    0.9 % sodium chloride infusion    lactated ringers bolus 1,797 mL     Order Specific Question:   Was full 30mL/kg fluid bolus ordered?     Answer:   Yes    cefTRIAXone (ROCEPHIN) 1,000 mg in sterile water 10 mL IV syringe     Order Specific Question:   Antimicrobial Indications     Answer:   Urinary Tract Infection     Order Specific Question:   Antimicrobial Indications     Answer:   Other     Order Specific Question:   Other Abx Indication     Answer:   Sepsis    HYDROcodone-acetaminophen (NORCO)  MG per tablet 1 tablet    azithromycin (ZITHROMAX) 500 mg in sodium chloride 0.9 % 250 mL (addEASE) IVPB     Order Specific Question:   Antimicrobial Indications     Answer:   Pneumonia (CAP)     Order Specific Question:   CAP duration of therapy     Answer:   5 days       Labs Reviewed   CBC WITH AUTO DIFFERENTIAL - Abnormal; Notable for the following components:       Result Value    WBC 14.3 (*)     RBC 3.24 (*)     Hemoglobin 8.6 (*)     Hematocrit 28.4 (*)     RDW 17.7 (*)     Platelets 911 (*)     Neutrophils % 69 (*)     Lymphocytes % 19 (*)     Immature Granulocytes % 1 (*)     Neutrophils Absolute 9.93 (*)     All other components within normal limits   URINALYSIS - Abnormal; Notable for the following components:    Turbidity UA SLIGHTLY CLOUDY (*)     Urine Hgb 3+ (*)     Protein, UA 3+ (*)     Leukocyte Esterase, Urine MOD (*)     All other  acute abdominal process. * Bilateral renal calculi. Approved by:Vinod Wolfe2/18/2025 3:19 AM. I, Bryson Corral,have reviewed the image(s) and agree with the findings in this report. Electronically signed: Bryson Corral.    Complete Echo (TTE) w/wo Imaging Agent, Strain, 3D, Bubble Study    Result Date: 2/17/2025  1 1 [facility] [address] [place] [address] [phone] (fax) Echocardiogram-[facility] Name: FRANCISCO MULLINS Study Date: 02/17/2025 08:11 AM MRN: [id number] Account #: [id number] B/P: 113 mmHg/60 mmHg HR: 88 bpm YOB: 1955 Location: [facility] Height: 72 in. Age: 69 year(s) Patient Room: 3235 Weight: 121 lb. Gender: Male Patient Status: InPt BSA: 1.72 m2 Indication: Fever, Septic Shock Examination: Echocardiogram (Complete), Lumason Contrast Image Quality: Fair Patient Consent: Procedure explained to patient Exam Details Contrast: I.V. dose of Lumason Conclusions Left Ventricle: The left ventricle is normal size. Global left ventricular systolic function is normal. EF range is estimated at 50 % -55 %. Left ventricular wall thickness is normal. No regional wall motion abnormality. Right Ventricle: The right ventricle is normal in size. Normal right ventricular systolic function. Doppler studies suggest normal right sided pressures. Left Atrium: The left atrium is mildly enlarged. Overall Conclusions: Due to suboptimal imaging Lumason contrast was administered for opacification and better delineation of endocardial borders. Measurements Left Ventricle Label Value Normal Value LVOTd 2.2 cm (19cm - 21cm) LVOT VTI 23 cm (18cm - 22cm) LVOT PGmax 5 mmHg LVDd, 2D 4.67 cm (4.2cm - 5.9cm) LVDs, 2D 3.92 cm (2.1cm - 4cm) IVSd, 2D 1 cm (0.6cm - 1.1cm) LVPWd, 2D 0.97 cm (0.6cm - 1cm) LV Mass, 2D .43 g LV Mass Index, 2D ASE 92.7 g/m?? (50g/m?? - 102.4g/m??) RWT, MM 0.42 (0 - 0.42) LVSVI, 2D 19.8 ml/m2 LVOT PGmean 3 mmHg LVSV_LVOT 87 ml Right

## 2025-03-05 PROBLEM — E43 SEVERE MALNUTRITION: Status: ACTIVE | Noted: 2025-03-05

## 2025-03-05 LAB
GLUCOSE BLD-MCNC: 130 MG/DL (ref 75–110)
GLUCOSE BLD-MCNC: 139 MG/DL (ref 75–110)
GLUCOSE BLD-MCNC: 168 MG/DL (ref 75–110)
MICROORGANISM SPEC CULT: NO GROWTH
SERVICE CMNT-IMP: NORMAL
SPECIMEN DESCRIPTION: NORMAL

## 2025-03-05 PROCEDURE — 2580000003 HC RX 258

## 2025-03-05 PROCEDURE — 6370000000 HC RX 637 (ALT 250 FOR IP): Performed by: FAMILY MEDICINE

## 2025-03-05 PROCEDURE — 99232 SBSQ HOSP IP/OBS MODERATE 35: CPT | Performed by: NURSE PRACTITIONER

## 2025-03-05 PROCEDURE — 97530 THERAPEUTIC ACTIVITIES: CPT

## 2025-03-05 PROCEDURE — 6360000002 HC RX W HCPCS: Performed by: FAMILY MEDICINE

## 2025-03-05 PROCEDURE — 94640 AIRWAY INHALATION TREATMENT: CPT

## 2025-03-05 PROCEDURE — 97167 OT EVAL HIGH COMPLEX 60 MIN: CPT

## 2025-03-05 PROCEDURE — 82947 ASSAY GLUCOSE BLOOD QUANT: CPT

## 2025-03-05 PROCEDURE — 97163 PT EVAL HIGH COMPLEX 45 MIN: CPT

## 2025-03-05 PROCEDURE — 6370000000 HC RX 637 (ALT 250 FOR IP)

## 2025-03-05 PROCEDURE — 2580000003 HC RX 258: Performed by: FAMILY MEDICINE

## 2025-03-05 PROCEDURE — 94761 N-INVAS EAR/PLS OXIMETRY MLT: CPT

## 2025-03-05 PROCEDURE — 99239 HOSP IP/OBS DSCHRG MGMT >30: CPT | Performed by: FAMILY MEDICINE

## 2025-03-05 PROCEDURE — 97535 SELF CARE MNGMENT TRAINING: CPT

## 2025-03-05 PROCEDURE — 1200000000 HC SEMI PRIVATE

## 2025-03-05 RX ORDER — 0.9 % SODIUM CHLORIDE 0.9 %
250 INTRAVENOUS SOLUTION INTRAVENOUS ONCE
Status: COMPLETED | OUTPATIENT
Start: 2025-03-05 | End: 2025-03-05

## 2025-03-05 RX ORDER — BENZONATATE 100 MG/1
100 CAPSULE ORAL 3 TIMES DAILY PRN
DISCHARGE
Start: 2025-03-05 | End: 2025-03-12

## 2025-03-05 RX ORDER — ALBUTEROL SULFATE 90 UG/1
2 INHALANT RESPIRATORY (INHALATION) EVERY 4 HOURS PRN
DISCHARGE
Start: 2025-03-05

## 2025-03-05 RX ORDER — LACTOBACILLUS RHAMNOSUS GG 10B CELL
1 CAPSULE ORAL EVERY MORNING
Qty: 30 CAPSULE | Refills: 0 | DISCHARGE
Start: 2025-03-05

## 2025-03-05 RX ORDER — DIPHENOXYLATE HYDROCHLORIDE AND ATROPINE SULFATE 2.5; .025 MG/1; MG/1
1 TABLET ORAL 4 TIMES DAILY PRN
Status: SHIPPED | OUTPATIENT
Start: 2025-03-05 | End: 2025-04-04

## 2025-03-05 RX ORDER — LIDOCAINE HYDROCHLORIDE 20 MG/ML
JELLY TOPICAL ONCE
Status: DISCONTINUED | OUTPATIENT
Start: 2025-03-05 | End: 2025-03-06 | Stop reason: HOSPADM

## 2025-03-05 RX ORDER — ALUMINUM HYDROXIDE, MAGNESIUM HYDROXIDE, SIMETHICONE 400; 400; 40 MG/10ML; MG/10ML; MG/10ML
30 SUSPENSION ORAL EVERY 6 HOURS PRN
DISCHARGE
Start: 2025-03-05

## 2025-03-05 RX ORDER — OXYCODONE AND ACETAMINOPHEN 10; 325 MG/1; MG/1
1 TABLET ORAL EVERY 6 HOURS PRN
Qty: 20 TABLET | Refills: 0 | Status: SHIPPED | OUTPATIENT
Start: 2025-03-05 | End: 2025-03-10

## 2025-03-05 RX ADMIN — BUDESONIDE AND FORMOTEROL FUMARATE DIHYDRATE 2 PUFF: 160; 4.5 AEROSOL RESPIRATORY (INHALATION) at 08:03

## 2025-03-05 RX ADMIN — MICONAZOLE NITRATE: 20 POWDER TOPICAL at 20:51

## 2025-03-05 RX ADMIN — GUAIFENESIN 600 MG: 600 TABLET ORAL at 20:49

## 2025-03-05 RX ADMIN — APIXABAN 5 MG: 5 TABLET, FILM COATED ORAL at 08:29

## 2025-03-05 RX ADMIN — SODIUM CHLORIDE 250 ML: 0.9 INJECTION, SOLUTION INTRAVENOUS at 00:14

## 2025-03-05 RX ADMIN — FERROUS SULFATE TAB EC 325 MG (65 MG FE EQUIVALENT) 325 MG: 325 (65 FE) TABLET DELAYED RESPONSE at 08:29

## 2025-03-05 RX ADMIN — OXYCODONE AND ACETAMINOPHEN 1 TABLET: 325; 10 TABLET ORAL at 08:29

## 2025-03-05 RX ADMIN — APIXABAN 5 MG: 5 TABLET, FILM COATED ORAL at 20:50

## 2025-03-05 RX ADMIN — FAMOTIDINE 20 MG: 20 TABLET, FILM COATED ORAL at 08:28

## 2025-03-05 RX ADMIN — PANCRELIPASE LIPASE, PANCRELIPASE PROTEASE, PANCRELIPASE AMYLASE 20000 UNITS: 20000; 63000; 84000 CAPSULE, DELAYED RELEASE ORAL at 12:34

## 2025-03-05 RX ADMIN — Medication 2 CAPSULE: at 18:06

## 2025-03-05 RX ADMIN — TIOTROPIUM BROMIDE INHALATION SPRAY 2 PUFF: 3.12 SPRAY, METERED RESPIRATORY (INHALATION) at 08:03

## 2025-03-05 RX ADMIN — DICYCLOMINE HYDROCHLORIDE 10 MG: 10 CAPSULE ORAL at 18:06

## 2025-03-05 RX ADMIN — ALUMINUM HYDROXIDE, MAGNESIUM HYDROXIDE, AND SIMETHICONE 20 ML: 200; 200; 20 SUSPENSION ORAL at 08:34

## 2025-03-05 RX ADMIN — TAMSULOSIN HYDROCHLORIDE 0.4 MG: 0.4 CAPSULE ORAL at 08:28

## 2025-03-05 RX ADMIN — PANCRELIPASE LIPASE, PANCRELIPASE PROTEASE, PANCRELIPASE AMYLASE 20000 UNITS: 20000; 63000; 84000 CAPSULE, DELAYED RELEASE ORAL at 08:28

## 2025-03-05 RX ADMIN — TRAZODONE HYDROCHLORIDE 50 MG: 50 TABLET ORAL at 21:07

## 2025-03-05 RX ADMIN — INSULIN GLARGINE 20 UNITS: 100 INJECTION, SOLUTION SUBCUTANEOUS at 08:36

## 2025-03-05 RX ADMIN — SULFAMETHOXAZOLE AND TRIMETHOPRIM 1 TABLET: 800; 160 TABLET ORAL at 08:29

## 2025-03-05 RX ADMIN — ALUMINUM HYDROXIDE, MAGNESIUM HYDROXIDE, AND SIMETHICONE 20 ML: 200; 200; 20 SUSPENSION ORAL at 20:50

## 2025-03-05 RX ADMIN — ATORVASTATIN CALCIUM 10 MG: 10 TABLET, FILM COATED ORAL at 20:49

## 2025-03-05 RX ADMIN — OXYBUTYNIN CHLORIDE 5 MG: 5 TABLET, EXTENDED RELEASE ORAL at 21:07

## 2025-03-05 RX ADMIN — BUDESONIDE AND FORMOTEROL FUMARATE DIHYDRATE 2 PUFF: 160; 4.5 AEROSOL RESPIRATORY (INHALATION) at 21:25

## 2025-03-05 RX ADMIN — Medication 2000 UNITS: at 08:28

## 2025-03-05 RX ADMIN — Medication 2 CAPSULE: at 08:28

## 2025-03-05 RX ADMIN — PANCRELIPASE LIPASE, PANCRELIPASE PROTEASE, PANCRELIPASE AMYLASE 5000 UNITS: 5000; 17000; 24000 CAPSULE, DELAYED RELEASE ORAL at 18:06

## 2025-03-05 RX ADMIN — METOPROLOL TARTRATE 25 MG: 25 TABLET, FILM COATED ORAL at 10:35

## 2025-03-05 RX ADMIN — OXYCODONE AND ACETAMINOPHEN 1 TABLET: 325; 10 TABLET ORAL at 20:49

## 2025-03-05 RX ADMIN — SODIUM BICARBONATE 1300 MG: 650 TABLET ORAL at 08:28

## 2025-03-05 RX ADMIN — AMITRIPTYLINE HYDROCHLORIDE 10 MG: 10 TABLET, FILM COATED ORAL at 20:50

## 2025-03-05 RX ADMIN — CHOLESTYRAMINE 4 G: 4 POWDER, FOR SUSPENSION ORAL at 08:35

## 2025-03-05 RX ADMIN — PANCRELIPASE LIPASE, PANCRELIPASE PROTEASE, PANCRELIPASE AMYLASE 5000 UNITS: 5000; 17000; 24000 CAPSULE, DELAYED RELEASE ORAL at 08:29

## 2025-03-05 RX ADMIN — PANCRELIPASE LIPASE, PANCRELIPASE PROTEASE, PANCRELIPASE AMYLASE 20000 UNITS: 20000; 63000; 84000 CAPSULE, DELAYED RELEASE ORAL at 18:06

## 2025-03-05 RX ADMIN — DICYCLOMINE HYDROCHLORIDE 10 MG: 10 CAPSULE ORAL at 20:51

## 2025-03-05 RX ADMIN — BUSPIRONE HYDROCHLORIDE 5 MG: 5 TABLET ORAL at 08:29

## 2025-03-05 RX ADMIN — DICYCLOMINE HYDROCHLORIDE 10 MG: 10 CAPSULE ORAL at 12:34

## 2025-03-05 RX ADMIN — SODIUM ZIRCONIUM CYCLOSILICATE 10 G: 10 POWDER, FOR SUSPENSION ORAL at 08:36

## 2025-03-05 RX ADMIN — GUAIFENESIN 600 MG: 600 TABLET ORAL at 08:28

## 2025-03-05 RX ADMIN — BUSPIRONE HYDROCHLORIDE 5 MG: 5 TABLET ORAL at 20:49

## 2025-03-05 RX ADMIN — CLOPIDOGREL BISULFATE 75 MG: 75 TABLET ORAL at 08:30

## 2025-03-05 RX ADMIN — SODIUM BICARBONATE 1300 MG: 650 TABLET ORAL at 21:07

## 2025-03-05 RX ADMIN — PANTOPRAZOLE SODIUM 40 MG: 40 TABLET, DELAYED RELEASE ORAL at 05:55

## 2025-03-05 RX ADMIN — METOPROLOL TARTRATE 25 MG: 25 TABLET, FILM COATED ORAL at 20:49

## 2025-03-05 RX ADMIN — OXYCODONE AND ACETAMINOPHEN 1 TABLET: 325; 10 TABLET ORAL at 14:40

## 2025-03-05 RX ADMIN — PANCRELIPASE LIPASE, PANCRELIPASE PROTEASE, PANCRELIPASE AMYLASE 5000 UNITS: 5000; 17000; 24000 CAPSULE, DELAYED RELEASE ORAL at 12:34

## 2025-03-05 RX ADMIN — DICYCLOMINE HYDROCHLORIDE 10 MG: 10 CAPSULE ORAL at 05:55

## 2025-03-05 RX ADMIN — MICAFUNGIN SODIUM 100 MG: 100 INJECTION, POWDER, LYOPHILIZED, FOR SOLUTION INTRAVENOUS at 08:41

## 2025-03-05 RX ADMIN — CLOTRIMAZOLE AND BETAMETHASONE DIPROPIONATE: 10; .5 CREAM TOPICAL at 20:52

## 2025-03-05 ASSESSMENT — ENCOUNTER SYMPTOMS
CONSTIPATION: 0
DIARRHEA: 0
ABDOMINAL PAIN: 1
SHORTNESS OF BREATH: 0
BACK PAIN: 0
COUGH: 0
SHORTNESS OF BREATH: 1
CHEST TIGHTNESS: 0
RHINORRHEA: 0
CONSTIPATION: 1
NAUSEA: 0
SINUS PRESSURE: 0
CHOKING: 0
WHEEZING: 0
VOICE CHANGE: 0
VOMITING: 0

## 2025-03-05 ASSESSMENT — PAIN SCALES - GENERAL
PAINLEVEL_OUTOF10: 10
PAINLEVEL_OUTOF10: 6
PAINLEVEL_OUTOF10: 5
PAINLEVEL_OUTOF10: 9
PAINLEVEL_OUTOF10: 9
PAINLEVEL_OUTOF10: 7

## 2025-03-05 ASSESSMENT — PAIN DESCRIPTION - LOCATION
LOCATION: ABDOMEN
LOCATION: ABDOMEN
LOCATION: GENERALIZED

## 2025-03-05 ASSESSMENT — PAIN DESCRIPTION - ORIENTATION
ORIENTATION: RIGHT;LEFT
ORIENTATION: RIGHT;LEFT

## 2025-03-05 ASSESSMENT — PAIN DESCRIPTION - PAIN TYPE: TYPE: CHRONIC PAIN

## 2025-03-05 ASSESSMENT — PAIN DESCRIPTION - DESCRIPTORS
DESCRIPTORS: SHARP
DESCRIPTORS: ACHING

## 2025-03-05 ASSESSMENT — PAIN DESCRIPTION - FREQUENCY: FREQUENCY: CONTINUOUS

## 2025-03-05 NOTE — DISCHARGE INSTR - COC
INSERT PICC VAD W SQ PORT >5 YEARS  11/6/2020    IR INS PICC VAD W SQ PORT GREATER THAN 5 11/6/2020 Zackary East MD [facility] SPECIAL PROCEDURES    IR INSERT PICC VAD W SQ PORT >5 YEARS  1/11/2021    IR INS PICC VAD W SQ PORT GREATER THAN 5 1/11/2021 Nolan Lauren MD [facility] SPECIAL PROCEDURES    IR INSERT PICC VAD W SQ PORT >5 YEARS  4/8/2021    IR INS PICC VAD W SQ PORT GREATER THAN 5 4/8/2021 Zackary East MD [facility] SPECIAL PROCEDURES    LEG AMPUTATION BELOW KNEE Right 01/21/2017    LEG AMPUTATION BELOW KNEE Left 2/9/2021    LEFT  LEG AMPUTATION BELOW KNEE performed by Ismael Larry MD at [facility] OR    LEG SURGERY Left 4/6/2021    STUMP DEBRIDEMENT INCISION AND DRAINAGE performed by Ismael Larry MD at [facility] OR    LITHOTRIPSY Bilateral 10/7/2022    CYSTO STENT EXCHANGE HOLMIUM LASER LITHOTRIPSY performed by Nicolas Gao MD at [facility] OR    TOE AMPUTATION Right 2014    rt 3rd through 5th digits    TOE AMPUTATION Left 5/26/2016    left foot 5th toe    TOE AMPUTATION Left 8/5/2020    FOOT TRANSMETATARSAL  AMPUTATION - AND LEFT PECUTANEOUS TENDO ACHILLES LENGTHENING performed by Jennifer Walters DPM at [facility] OR    TOE AMPUTATION Left 8/24/2020    REVISION  TRANSMETATARSAL AMPUTATION WITH DEBRIDEMENT. performed by Jennifer Walters DPM at [facility] OR    UPPER GASTROINTESTINAL ENDOSCOPY      5/14/13- with dilation    VASCULAR SURGERY Right 01/16/2017    foot guillotine amputation       Immunization History:   Immunization History   Administered Date(s) Administered    Influenza Vaccine, unspecified formulation 10/10/2016    Influenza Virus Vaccine 11/03/2014, 10/29/2015    Influenza, AFLURIA (age 3 y+), FLUZONE, (age 6 mo+), Quadv MDV, 0.5mL 10/10/2016    Pneumococcal, PPSV23, PNEUMOVAX 23, (age 2y+), SC/IM, 0.5mL 09/05/2012, 10/29/2015    TDaP, ADACEL (age 10y-64y), BOOSTRIX (age 10y+), IM, 0.5mL 07/01/2019       Active Problems:  Patient Active Problem List   Diagnosis Code    Type 2 diabetes mellitus with diabetic  Indicated Last Indicated By Review Planned Expiration Resolved Resolved By    C-diff Rule Out 25 C DIFF TOXIN/ANTIGEN (Ordered) 25      MRSA 21 Culture, Urine        2021 blood  Foot 4/3/21    VRE 20 Culture, Anaerobic and Aerobic        Foot - 2020    Resolved    C-diff (Clostridium difficile) 24 C. difficile toxin Molecular  history 10/07/24 Infection     C-diff Rule Out  22 Cindi Diop, HA   22 Cindi Diop, RN    Order     MRSA  17 Shira Ochoa RN   20 Shira Ochoa RN    2 negative nasal screen -   Foot - 2018                       Nurse Assessment:  Last Vital Signs: /74   Pulse 88   Temp 98.2 °F (36.8 °C) (Oral)   Resp 16   Ht 1.829 m (6')   Wt 51.5 kg (113 lb 8 oz)   SpO2 95%   BMI 15.39 kg/m²     Last documented pain score (0-10 scale): Pain Level: 7  Last Weight:   Wt Readings from Last 1 Encounters:   25 51.5 kg (113 lb 8 oz)     Mental Status:  {IP PT MENTAL STATUS:62940}    IV Access:  { LOREE IV ACCESS:105701335}    Nursing Mobility/ADLs:  Walking   {CHP DME ADLs:704693523}  Transfer  {CHP DME ADLs:033056616}  Bathing  {CHP DME ADLs:035481307}  Dressing  {CHP DME ADLs:871551956}  Toileting  {CHP DME ADLs:725463964}  Feeding  {CHP DME ADLs:118288928}  Med Admin  {CHP DME ADLs:272205710}  Med Delivery   { LOREE MED Delivery:083478280}    Wound Care Documentation and Therapy:  Wound Buttocks (Active)   Number of days:         Elimination:  Continence:   Bowel: {YES / NO:}  Bladder: {YES / NO:}  Urinary Catheter: {Urinary Catheter:035675998}   Colostomy/Ileostomy/Ileal Conduit: {YES / NO:}       Date of Last BM: ***    Intake/Output Summary (Last 24 hours) at 3/5/2025 1413  Last data filed at 3/5/2025 0552  Gross per 24 hour   Intake 120 ml   Output 1400 ml   Net -1280 ml     I/O

## 2025-03-05 NOTE — PROGRESS NOTES
Comprehensive Nutrition Assessment    Type and Reason for Visit:  Positive nutrition screen (Unsure of amount of weight loss and decreased appetite)    Nutrition Recommendations/Plan:   ADULT DIET; Regular; 4 carb choices (60 gm/meal); 1500 ml  Glucerna 3x/day  Monitor p.o intakes and labs     Malnutrition Assessment:  Malnutrition Status:  Severe malnutrition (03/05/25 7868)    Context:  Chronic Illness     Findings of the 6 clinical characteristics of malnutrition:  Energy Intake:  75% or less estimated energy requirements for 1 month or longer  Weight Loss:  Greater than 7.5% over 3 months     Body Fat Loss:  Severe body fat loss Triceps   Muscle Mass Loss:  Severe muscle mass loss Temples (temporalis), Clavicles (pectoralis & deltoids)  Fluid Accumulation:  No fluid accumulation Extremities   Strength:  Not Performed    Nutrition Assessment:    Patient admission is related to UTI and altered mental status. Patient has a medical history for diabetes mellitus, CKD, hemodialysis, COPD, esophageal cancer and bilateral BKA. Positive nutrition therapy received for possible weight loss and decreased appetite. Electronic weight records indicate a weight loss of 11% over the past 4 months which is sigificant. Patient appears to be severely malnourished. Patient is currently on a Regular; 4 carb choices; 1500 mL fluid restriction and Glucerna at meals. Monitor p.o intakes and labs.    Nutrition Related Findings:    No edema. active bowel sounds. Bilateral BKA. Altered mental status Wound Type: None       Current Nutrition Intake & Therapies:    Average Meal Intake: Unable to assess  Average Supplements Intake: Unable to assess  ADULT DIET; Regular; 4 carb choices (60 gm/meal); 1500 ml  ADULT ORAL NUTRITION SUPPLEMENT; Breakfast, Dinner, Lunch; Diabetic Oral Supplement    Anthropometric Measures:  Height: 182.9 cm (6')  Ideal Body Weight (IBW): 178 lbs (81 kg)       Current Body Weight: 51.3 kg (113 lb), 63.5 % IBW.  Weight Source: Bed scale  Current BMI (kg/m2): 15.3  Usual Body Weight: 57.6 kg (127 lb) (10/11/24)     % Weight Change (Calculated): -11  Weight Adjustment For: Amputation  Total Adjusted Percentage (Calculated): 11.8  Adjusted Ideal Body Weight (lbs) (Calculated): 157 lbs  Adjusted Ideal Body Weight (kg) (Calculated): 71.36 kg  Adjusted % Ideal Body Weight (Calculated): 72  Adjusted BMI (kg/m2) (Calculated): 17.1  BMI Categories: Underweight (BMI less than 22) age over 65    Estimated Daily Nutrient Needs:  Energy Requirements Based On: Kcal/kg  Weight Used for Energy Requirements: Current  Energy (kcal/day): 1284-4536 kcal (32-35 kcal/kg)  Weight Used for Protein Requirements: Current  Protein (g/day): 67-77 gm of protein (1.3-1.5 gm/kg)  Method Used for Fluid Requirements: Defer to provider  Fluid (ml/day): 1500 mL per diet order    Nutrition Diagnosis:   Severe malnutrition related to inadequate protein-energy intake as evidenced by intake 0-25%, BMI, weight loss, loss of subcutaneous fat, muscle loss    Nutrition Interventions:   Food and/or Nutrient Delivery: Continue Current Diet, Continue Oral Nutrition Supplement  Nutrition Education/Counseling: Education/Counseling not indicated  Coordination of Nutrition Care: Continue to monitor while inpatient       Goals:  Goals: PO intake 75% or greater          Nutrition Monitoring and Evaluation:      Food/Nutrient Intake Outcomes: Food and Nutrient Intake, Supplement Intake  Physical Signs/Symptoms Outcomes: Biochemical Data, Nausea or Vomiting, Skin, Weight, GI Status    Discharge Planning:    Continue current diet, Continue Oral Nutrition Supplement         Kelin DAVISN, RDN, LDN  Lead Clinical Dietitian  RD Office Phone [phone]
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
## 2025-03-05 NOTE — PROGRESS NOTES
[facility]  Progress Note    Nicolas Cardenas  MRN: [id number]  Date: 3/5/2025  LOS: 2     Reason for F/U :   UTI/pneumonia    Impression :   Recent Candida glabrata fungemia secondary to complicated urinary tract infection, 2/15/2025  Patient to complete micafungin therapy 3/5/2025 then transition to fluconazole 200 mg daily x 14 days per previous plan from [facility]  Linear opacity in the left lung base concerning for pneumonia  Recent imaging from last week did show left base atelectasis  Recent influenza A respiratory infection, 2/24/2025  Status post Tamiflu x 5 days  Chronic kidney disease stage IIIa  Diabetes mellitus type 2  Chronic venous insufficiency status post bilateral BKA  Chronic pancreatitis  History of C. difficile infection, most recent September 2024    Recommendations:   Patient has been started on IV ceftriaxone and azithromycin  Clinically doubt the patient has pneumonia, he does not have any signs of respiratory distress at this time.  He did recently have influenza A [2/24/2025] and chest imaging did show left basilar atelectasis.  Patient does not have any respiratory signs of infection  I doubt patient has urinary tract infection, urine culture was negative  At this time, especially in light of the patient's history of C. difficile and unlikelihood of an acute infection at this time, I would recommend monitoring the patient off antibiotics  Patient is to complete his micafungin therapy today then can be transition to fluconazole x 14 days as previously planned per [facility]  Blood cultures negative  Patient is okay for discharge from an infectious disease standpoint when okay with other services    Infection Control Recommendations:   Contact precautions    Discharge Planning:     Patient will need Midline Catheter Insertion/ PICC line Insertion: No  Patient will need: Home IV , Infusion Center,  SNF,  LTAC: Undetermined  Patient willneed outpatient wound care: No    Medical  Decision making / Summary of Stay:   Nicolas Cardenas is a 69 y.o.-year-old male who was initially admitted on 3/3/2025.  He also has a history of diabetes mellitus type 2, peripheral vascular disease status post bilateral below the knee amputation, chronic kidney disease, COPD, C. difficile-most recent episode in September 2024.     Patient is known to our service from multiple hospitalizations in the past.  He does have chronic urinary retention status post chronic indwelling Quesada catheter placement.  He has also had bilateral ureteral stents that were recently replaced.       In December 2024 he had a left ureteral calculus and underwent left-sided stent placement.  Culture grew Candida albicans at that time.  Patient January 2025 he was admitted with abdominal pain, found to have a right ureteral calculus that was 1 cm, obstructing.  He underwent lithotripsy and right ureteral stent placement.  He received linezolid and was discharged to a nursing facility with an indwelling Quesada catheter.  He was again readmitted at the beginning of February with abdominal pain, treated with linezolid and fluconazole and discharged again.     Patient was then admitted 2/15/2025 and diagnosed with candida glabrata fungemeia secondary to complicated UTI. He was treated by [facility] infectious diseases group. He received micafungin 100 mg daily, with plans to complete on 3/5/2025 then the plan was transitioned to the patient to oral fluconazole 200 mg daily x 14 days.  During that admission patient also tested positive for influenza A, he did receive Tamiflu.  CT abdomen and pelvis with bilateral tree-in-bud nodularity, thought secondary to the recent viral illness.  Chest x-ray was negative.  White blood cell count at the time of discharge was 17,000.  Patient was discharged to a skilled nursing facility on 2/28/2025.     Patient was now brought to [facility] emergency department 3/3/2025 for altered mental status, abdominal pain,  Order Status: No result Specimen: Stool     Sputum gram stain [[id number]]     Order Status: Sent Specimen: Sputum Expectorated     Culture, Blood 1 [[id number]] Collected: 03/03/25 1830    Order Status: Completed Specimen: Blood Updated: 03/04/25 2212     Specimen Description .BLOOD L HAND 10ML     Special Requests          Culture NO GROWTH 1 DAY    Culture, Blood 2 [[id number]] Collected: 03/03/25 1815    Order Status: Completed Specimen: Blood Updated: 03/04/25 2208     Specimen Description .BLOOD R HAND 2ML     Special Requests          Culture NO GROWTH 1 DAY    Culture, Urine [[id number]] Collected: 03/03/25 1803    Order Status: Completed Specimen: Urine, clean catch Updated: 03/05/25 0806     Specimen Description .CLEAN CATCH URINE     Special Requests Site: Urine     Culture NO GROWTH            Medications:      lidocaine   Urethral Once    insulin lispro  0-4 Units SubCUTAneous 4x Daily AC & HS    ferrous sulfate  325 mg Oral Daily with breakfast    tamsulosin  0.4 mg Oral Daily    cholestyramine light  4 g Oral Daily    guaiFENesin  600 mg Oral BID    oxyBUTYnin  5 mg Oral Nightly    sodium bicarbonate  1,300 mg Oral BID    lactobacillus  2 capsule Oral BID WC    traZODone  50 mg Oral Nightly    metoprolol tartrate  25 mg Oral BID    clotrimazole-betamethasone   Topical BID    miconazole   Topical BID    nicotine  1 patch TransDERmal Daily    famotidine  20 mg Oral Daily    insulin glargine  20 Units SubCUTAneous Daily    apixaban  5 mg Oral BID    polyethylene glycol  17 g Oral Daily    aluminum & magnesium hydroxide-simethicone  20 mL Oral BID    tiotropium  2 puff Inhalation Daily RT    lipase-protease-amylase  5,000 Units Oral TID WC    lipase-protease-amylase  20,000 Units Oral TID WC    sulfamethoxazole-trimethoprim  1 tablet Oral 2 times per day    Vitamin D  2,000 Units Oral QAM    dicyclomine  10 mg Oral 4x Daily AC & HS    amitriptyline  10 mg Oral Nightly    atorvastatin  10 mg Oral Nightly

## 2025-03-05 NOTE — PROGRESS NOTES
[facility]  Office: [phone]  Baldomero Maldonado DO, Luis Valdivia DO, Eric Rojas DO, Rajinder Chiu DO, Erica Daugherty MD, Carolina Hernandez MD, Haleigh Sorto MD, Hannah Blum MD,  Isael Velazco MD, Cora Coughlin MD, Alex Ferrell MD,  Chela Doan DO, Trent Kearns MD, Manolo Potter MD, Hans Maldonado DO, Mei Tolbert MD,  Gianni De La Rosa DO, Aranza Wakefield MD, Magdalena Dill MD, Mary Ann Cheek MD, Jenny Williamson MD,  Panfilo Hall MD, Dorcas Gillis MD, Nato Gunter MD, Opal Davis MD, Red Small MD, Giana Larsen MD, Trenton Wong DO, Joe Franklin MD, Chela Whitney MD, Mohsin Reza, MD, Shirley Waterhouse, CNP,  Xena Harrell CNP, Trenton Campbell, CNP,  Celena Corona, ANIYAH, Terrie Mathur, CNP, Dalia Lopes, CNP, Chiquita Manuel, CNP, Anh Kolb CNP, AMITA Casanova-C, Gris Thorpe, CNP, Reymundo Todd, CNP,  Cristal Padilla, CNP, Annette Kimble, CNP, Elizabeth Clemente, CNP,  Lea Fletcher, CNS, Kelin Hernandez CNP, Norma Soliman CNP,   Lima Boyle, CNP       [facility]      Daily Progress Note     Admit Date: 3/3/2025  Bed/Room No.  2022/2022-01  Admitting Physician : Haleigh Sorto MD  Code Status :Full Code  Hospital Day:  LOS: 2 days   Chief Complaint:     Chief Complaint   Patient presents with    Altered Mental Status     Principal Problem:    Complicated UTI (urinary tract infection)  Active Problems:    Type 2 diabetes mellitus with diabetic polyneuropathy, with long-term current use of insulin (HCC)    Esophageal cancer (HCC)    DM type 2 with diabetic peripheral neuropathy (HCC)    COPD without exacerbation (HCC)    HLD (hyperlipidemia)    Gastroesophageal reflux disease without esophagitis    Chronic pain syndrome    PAD (peripheral artery disease)    S/P BKA (below knee amputation) bilateral (HCC)    Essential hypertension    History of pulmonary embolism - 2017    Anemia, normocytic normochromic    Drug-induced  weakness, light-headedness and headaches.   Hematological:  Does not bruise/bleed easily.   Psychiatric/Behavioral:  Negative for agitation, behavioral problems, confusion, self-injury, sleep disturbance and suicidal ideas.      Objective :      Current Vitals : Temp: 98.2 °F (36.8 °C),  Pulse: 88, Respirations: 16, BP: 131/74, SpO2: 95 %  Last 24 Hrs Vitals   Patient Vitals for the past 24 hrs:   BP Temp Temp src Pulse Resp SpO2 Weight   03/05/25 0859 -- -- -- -- 16 -- --   03/05/25 0804 -- -- -- 88 17 95 % --   03/05/25 0715 131/74 98.2 °F (36.8 °C) Oral 92 18 95 % --   03/05/25 0402 114/76 97.7 °F (36.5 °C) Oral 81 16 94 % 51.5 kg (113 lb 8 oz)   03/05/25 0218 (!) 122/53 -- -- -- -- -- --   03/04/25 2345 (!) 88/45 -- -- -- -- -- --   03/04/25 2316 (!) 86/45 98.1 °F (36.7 °C) -- 80 18 93 % --   03/04/25 1853 (!) 118/59 98.2 °F (36.8 °C) Oral 89 22 93 % --   03/04/25 1429 -- -- -- -- 20 -- --     Intake / output   03/03 1901 - 03/05 0700  In: 1044 [P.O.:1044]  Out: 1400 [Urine:1400]  Physical Exam:  Physical Exam  Vitals and nursing note reviewed.   Constitutional:       General: He is not in acute distress.     Appearance: He is not diaphoretic.   HENT:      Head: Normocephalic and atraumatic.      Nose:      Right Sinus: No maxillary sinus tenderness or frontal sinus tenderness.      Left Sinus: No maxillary sinus tenderness or frontal sinus tenderness.      Mouth/Throat:      Pharynx: No oropharyngeal exudate.   Eyes:      General: No scleral icterus.     Conjunctiva/sclera: Conjunctivae normal.      Pupils: Pupils are equal, round, and reactive to light.   Neck:      Thyroid: No thyromegaly.      Vascular: No JVD.   Cardiovascular:      Rate and Rhythm: Normal rate and regular rhythm.      Pulses:           Dorsalis pedis pulses are 2+ on the right side and 2+ on the left side.      Heart sounds: Normal heart sounds. No murmur heard.  Pulmonary:      Effort: Pulmonary effort is normal.      Breath sounds:  Normal breath sounds. No wheezing or rales.   Abdominal:      Palpations: Abdomen is soft. There is no mass.      Tenderness: There is no abdominal tenderness.   Musculoskeletal:      Cervical back: Full passive range of motion without pain and neck supple.      Right Lower Extremity: Right leg is amputated below knee.      Left Lower Extremity: Left leg is amputated below knee.   Lymphadenopathy:      Head:      Right side of head: No submandibular adenopathy.      Left side of head: No submandibular adenopathy.      Cervical: No cervical adenopathy.   Skin:     General: Skin is warm.   Neurological:      Mental Status: He is alert and oriented to person, place, and time.      Motor: No tremor.   Psychiatric:         Behavior: Behavior is cooperative.           Laboratory findings:    Recent Labs     03/03/25  1830 03/04/25  0625   WBC 14.3* 10.6   HGB 8.6* 7.6*   HCT 28.4* 25.8*   * 807*   INR 1.0  --      Recent Labs     03/03/25  1830 03/04/25  0625 03/04/25  1106    142  --    K 5.0 4.4  --    * 114*  --    CO2 17* 20  --    GLUCOSE 175* 116* 84   BUN 32* 27*  --    CREATININE 1.3* 1.2  --    CALCIUM 8.7* 8.4*  --      Recent Labs     03/03/25  1830   AST 13   ALT <5*   ALKPHOS 152*   BILITOT <0.2   LIPASE 17          Spec Grav, UA   Date Value Ref Range Status   07/18/2016 1.025  Final     Protein, UA   Date Value Ref Range Status   03/03/2025 3+ (A) NEGATIVE mg/dL Final     RBC, UA   Date Value Ref Range Status   03/03/2025 TOO NUMEROUS TO COUNT 0 - 2 /HPF Final     Blood, UA POC   Date Value Ref Range Status   07/18/2016 trace-intact  Final     Bacteria, UA   Date Value Ref Range Status   01/02/2025 MODERATE (A) None Final     Nitrite, Urine   Date Value Ref Range Status   03/03/2025 NEGATIVE NEGATIVE Final     WBC, UA   Date Value Ref Range Status   03/03/2025 10 TO 20 0 - 5 /HPF Final     Leukocyte Esterase, Urine   Date Value Ref Range Status   03/03/2025 MOD (A) NEGATIVE Final

## 2025-03-05 NOTE — PROGRESS NOTES
Nicolas Gao MD   Urology Progress Note            Subjective: Follow-up urinary retention kidney stones    Patient Vitals for the past 24 hrs:   BP Temp Temp src Pulse Resp SpO2 Height Weight   03/05/25 0402 114/76 97.7 °F (36.5 °C) Oral 81 16 94 % -- 51.5 kg (113 lb 8 oz)   03/05/25 0218 (!) 122/53 -- -- -- -- -- -- --   03/04/25 2345 (!) 88/45 -- -- -- -- -- -- --   03/04/25 2316 (!) 86/45 98.1 °F (36.7 °C) -- 80 18 93 % -- --   03/04/25 1853 (!) 118/59 98.2 °F (36.8 °C) Oral 89 22 93 % -- --   03/04/25 1429 -- -- -- -- 20 -- -- --   03/04/25 1358 -- -- -- -- 20 -- -- --   03/04/25 1300 -- -- -- -- -- -- 1.829 m (6') 54.2 kg (119 lb 8 oz)   03/04/25 1252 129/63 97.5 °F (36.4 °C) Oral 85 20 92 % -- --   03/04/25 1130 -- -- -- 99 29 91 % -- --   03/04/25 1115 -- -- -- 91 25 92 % -- --   03/04/25 1113 -- -- -- 91 22 94 % -- --   03/04/25 1100 -- -- -- 90 17 99 % -- --   03/04/25 1045 -- -- -- 84 23 97 % -- --   03/04/25 1030 -- -- -- 66 (!) 0 98 % -- --   03/04/25 1000 (!) 119/54 -- -- 69 26 98 % -- --   03/04/25 0945 -- -- -- 76 18 98 % -- --   03/04/25 0930 -- -- -- 75 25 99 % -- --   03/04/25 0915 -- -- -- 67 20 98 % -- --   03/04/25 0900 -- -- -- 68 18 98 % -- --   03/04/25 0845 -- -- -- 67 (!) 0 98 % -- --   03/04/25 0830 -- -- -- 69 22 97 % -- --   03/04/25 0815 -- -- -- 73 17 97 % -- --   03/04/25 0800 (!) 109/57 -- -- 77 21 97 % -- --   03/04/25 0745 -- -- -- 73 23 99 % -- --   03/04/25 0730 -- -- -- 68 21 99 % -- --   03/04/25 0715 -- -- -- 70 20 99 % -- --   03/04/25 0700 -- -- -- 68 17 99 % -- --   03/04/25 0645 -- -- -- 78 26 100 % -- --   03/04/25 0630 -- -- -- 73 22 100 % -- --   03/04/25 0615 -- -- -- 76 25 99 % -- --   03/04/25 0608 -- -- -- 72 21 100 % -- --   03/04/25 0600 125/63 -- -- 82 19 100 % -- --       Intake/Output Summary (Last 24 hours) at 3/5/2025 0531  Last data filed at 3/4/2025 9245  Gross per 24 hour   Intake 1044 ml   Output 1000 ml   Net 44 ml     Community acquired pneumonia    Severe malnutrition    Candida infection    Complicated UTI (urinary tract infection)       Plan: Follow-up at [facility] for management of ureteral calculi and stent removal    Nicolas Gao MD  5:31 AM 3/5/2025

## 2025-03-05 NOTE — CARE COORDINATION
Social work:  Noted patient from [facility] and they are able to accept patient back.   Will mike precert.   VM left for Marisela/BIN to confirm plan- await c/b.  In the meantime, spoke to pt at bedside and confirmed plan is return to [place]; writer advised him insurance precert is pending.
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
## 2025-03-05 NOTE — PROGRESS NOTES
Physician Progress Note      PATIENT:               FRANCISCO MULLINS  CSN #:                  [id number]  :                       1955  ADMIT DATE:       3/3/2025 5:42 PM  DISCH DATE:  RESPONDING  PROVIDER #:        Haleigh Sorto MD          QUERY TEXT:    Patient admitted with cystitis. 3/3 H&P states \"ED workup is significant for   imaging showing sinusitis and patchy infiltrates, possibly pneumonia\" and   \"Community acquired pneumonia\". 3/5 ID states \"Clinically doubt the patient   has pneumonia, he does not have any signs of respiratory distress at this   time.  He did recently have influenza A [2025] and chest imaging did show   left basilar atelectasis.  Patient does not have any respiratory signs of   infection\". 3/5 IM states \"Community acquired pneumonia\". CXR \"Mild patchy and   linear opacities at the left lung base could be     The medical record reflects the following:  Risk Factors: 68yo, recent influenza, smoker, CHF, DM2, CHF  Clinical Indicators: 3/3 H&P states \"ED workup is significant for imaging   showing sinusitis and patchy infiltrates, possibly pneumonia\" and \"Community   acquired pneumonia\". 3/5 ID states \"Clinically doubt the patient has   pneumonia, he does not have any signs of respiratory distress at this time.    He did recently have influenza A [2025] and chest imaging did show left   basilar atelectasis.  Patient does not have any respiratory signs of   infection\". 3/5 IM states \"Community acquired pneumonia\". CXR \"Mild patchy and   linear opacities at the left lung base could be related ate  Treatment: labs, imaging, IV antibiotics, tele, ID consult, IV fluids    Thank you,  Chiquita Trejo ([facility]), RN BSN  Clinical   Options provided:  -- Pneumonia confirmed  -- Pneumonia ruled out  -- Other - I will add my own diagnosis  -- Disagree - Not applicable / Not valid  -- Disagree - Clinically unable to determine / Unknown  -- Refer to Clinical  Documentation Reviewer    PROVIDER RESPONSE TEXT:    After study, Pneumonia was ruled out.    Query created by: Chiquita Crowley on 3/5/2025 2:49 PM      Electronically signed by:  Haleigh Sorto MD 3/5/2025 3:03 PM

## 2025-03-05 NOTE — CARE COORDINATION
Case Management Assessment  Initial Evaluation    Date/Time of Evaluation: 3/5/2025 3:25 PM  Assessment Completed by: HUNTER Lobato    If patient is discharged prior to next notation, then this note serves as note for discharge by case management.    Patient Name: Nicolas Cardenas                   YOB: 1955  Diagnosis: Complicated UTI (urinary tract infection) [N39.0]  Altered mental status, unspecified altered mental status type [R41.82]  Urinary tract infection associated with indwelling urethral catheter, initial encounter [T83.511A, N39.0]                   Date / Time: 3/3/2025  5:42 PM    Patient Admission Status: Inpatient   Readmission Risk (Low < 19, Mod (19-27), High > 27): Readmission Risk Score: 26    Current PCP: Rosa Pond APRN - NP  PCP verified by CM? Yes    Chart Reviewed: Yes      History Provided by: Patient  Patient Orientation: Alert and Oriented    Patient Cognition: Alert    Hospitalization in the last 30 days (Readmission):  No    If yes, Readmission Assessment in  Navigator will be completed.    Advance Directives:      Code Status: Full Code   Patient's Primary Decision Maker is: Legal Next of Kin    Primary Decision Maker: Marisela Ramirez - Child, Legal Guardian - [phone]    Discharge Planning:    Patient lives with: Other (Comment) Type of Home: Skilled Nursing Facility  Primary Care Giver: Other (Comment)  Patient Support Systems include: Children   Current Financial resources: None  Current community resources: ECF/Home Care  Current services prior to admission: Skilled Nursing Facility            Current DME:              Type of Home Care services:  PT, OT, Skilled Therapy    ADLS  Prior functional level: Assistance with the following:, Bathing, Dressing, Toileting, Cooking, Housework, Shopping, Mobility  Current functional level: Assistance with the following:, Mobility, Shopping, Housework, Cooking, Toileting, Dressing, Bathing    PT AM-PAC: 9  support    Barriers to discharge: Upper extremity weakness and Lower extremity weakness    Additional Case Management Notes: Met with patient at bedside to complete discharge assessment.  He presents from [facility]- informed him precert pending at this time for rtn.   VM left for Marisela/martina to inform of above.       The Plan for Transition of Care is related to the following treatment goals of Complicated UTI (urinary tract infection) [N39.0]  Altered mental status, unspecified altered mental status type [R41.82]  Urinary tract infection associated with indwelling urethral catheter, initial encounter [T83.511A, N39.0]    IF APPLICABLE: The Patient and/or patient representative Nicolas and his family were provided with a choice of provider and agrees with the discharge plan. Freedom of choice list with basic dialogue that supports the patient's individualized plan of care/goals and shares the quality data associated with the providers was provided to: Patient   Patient Representative Name:       The Patient and/or Patient Representative Agree with the Discharge Plan? Yes    HUNTER Lobato  Case Management Department  Ph: [phone] Fax: [phone]

## 2025-03-05 NOTE — PLAN OF CARE
Patient A&O x4, on bedrest d/t hx of bilateral BKA  Seroquel discontinued per daughter's request stating that he has an allergy to it. Discontinued and added to allergy list, on call NP Bharathosso  Blood noted in dailey, Dr. Gao notified and will be in this AM   Having frequent loose stools  Complaints of pain in the abdomen, percocet given  Needed 1 unit of Humalog coverage per sliding scale for HS blood sugar of 227  Safety maintained   Problem: Safety - Adult  Goal: Free from fall injury  Outcome: Progressing     Problem: Chronic Conditions and Co-morbidities  Goal: Patient's chronic conditions and co-morbidity symptoms are monitored and maintained or improved  Outcome: Progressing     Problem: Discharge Planning  Goal: Discharge to home or other facility with appropriate resources  Outcome: Progressing     Problem: Pain  Goal: Verbalizes/displays adequate comfort level or baseline comfort level  Outcome: Progressing     Problem: ABCDS Injury Assessment  Goal: Absence of physical injury  Outcome: Progressing     Problem: Skin/Tissue Integrity  Goal: Skin integrity remains intact  Description: 1.  Monitor for areas of redness and/or skin breakdown  2.  Assess vascular access sites hourly  3.  Every 4-6 hours minimum:  Change oxygen saturation probe site  4.  Every 4-6 hours:  If on nasal continuous positive airway pressure, respiratory therapy assess nares and determine need for appliance change or resting period  Outcome: Progressing     Problem: Skin/Tissue Integrity - Adult  Goal: Skin integrity remains intact  Description: 1.  Monitor for areas of redness and/or skin breakdown  2.  Assess vascular access sites hourly  3.  Every 4-6 hours minimum:  Change oxygen saturation probe site  4.  Every 4-6 hours:  If on nasal continuous positive airway pressure, respiratory therapy assess nares and determine need for appliance change or resting period  Outcome: Progressing     Problem: Skin/Tissue Integrity -

## 2025-03-06 VITALS
RESPIRATION RATE: 16 BRPM | DIASTOLIC BLOOD PRESSURE: 64 MMHG | TEMPERATURE: 97.7 F | HEART RATE: 83 BPM | BODY MASS INDEX: 15.4 KG/M2 | HEIGHT: 72 IN | SYSTOLIC BLOOD PRESSURE: 136 MMHG | OXYGEN SATURATION: 96 % | WEIGHT: 113.7 LBS

## 2025-03-06 LAB
GLUCOSE BLD-MCNC: 171 MG/DL (ref 75–110)
GLUCOSE BLD-MCNC: 236 MG/DL (ref 75–110)

## 2025-03-06 PROCEDURE — 97530 THERAPEUTIC ACTIVITIES: CPT

## 2025-03-06 PROCEDURE — 99232 SBSQ HOSP IP/OBS MODERATE 35: CPT | Performed by: NURSE PRACTITIONER

## 2025-03-06 PROCEDURE — 2580000003 HC RX 258: Performed by: FAMILY MEDICINE

## 2025-03-06 PROCEDURE — 6360000002 HC RX W HCPCS: Performed by: FAMILY MEDICINE

## 2025-03-06 PROCEDURE — 94640 AIRWAY INHALATION TREATMENT: CPT

## 2025-03-06 PROCEDURE — 82947 ASSAY GLUCOSE BLOOD QUANT: CPT

## 2025-03-06 PROCEDURE — 99239 HOSP IP/OBS DSCHRG MGMT >30: CPT | Performed by: FAMILY MEDICINE

## 2025-03-06 PROCEDURE — 97535 SELF CARE MNGMENT TRAINING: CPT

## 2025-03-06 PROCEDURE — 6370000000 HC RX 637 (ALT 250 FOR IP): Performed by: NURSE PRACTITIONER

## 2025-03-06 PROCEDURE — 6370000000 HC RX 637 (ALT 250 FOR IP)

## 2025-03-06 PROCEDURE — 6370000000 HC RX 637 (ALT 250 FOR IP): Performed by: FAMILY MEDICINE

## 2025-03-06 RX ADMIN — PANCRELIPASE LIPASE, PANCRELIPASE PROTEASE, PANCRELIPASE AMYLASE 20000 UNITS: 20000; 63000; 84000 CAPSULE, DELAYED RELEASE ORAL at 12:59

## 2025-03-06 RX ADMIN — MICAFUNGIN SODIUM 100 MG: 100 INJECTION, POWDER, LYOPHILIZED, FOR SOLUTION INTRAVENOUS at 09:00

## 2025-03-06 RX ADMIN — PANCRELIPASE LIPASE, PANCRELIPASE PROTEASE, PANCRELIPASE AMYLASE 5000 UNITS: 5000; 17000; 24000 CAPSULE, DELAYED RELEASE ORAL at 12:59

## 2025-03-06 RX ADMIN — FAMOTIDINE 20 MG: 20 TABLET, FILM COATED ORAL at 08:49

## 2025-03-06 RX ADMIN — INSULIN GLARGINE 20 UNITS: 100 INJECTION, SOLUTION SUBCUTANEOUS at 08:49

## 2025-03-06 RX ADMIN — INSULIN LISPRO 1 UNITS: 100 INJECTION, SOLUTION INTRAVENOUS; SUBCUTANEOUS at 08:49

## 2025-03-06 RX ADMIN — LOPERAMIDE HYDROCHLORIDE 2 MG: 2 CAPSULE ORAL at 05:57

## 2025-03-06 RX ADMIN — DICYCLOMINE HYDROCHLORIDE 10 MG: 10 CAPSULE ORAL at 12:22

## 2025-03-06 RX ADMIN — PANCRELIPASE LIPASE, PANCRELIPASE PROTEASE, PANCRELIPASE AMYLASE 5000 UNITS: 5000; 17000; 24000 CAPSULE, DELAYED RELEASE ORAL at 08:48

## 2025-03-06 RX ADMIN — PANTOPRAZOLE SODIUM 40 MG: 40 TABLET, DELAYED RELEASE ORAL at 05:57

## 2025-03-06 RX ADMIN — CLOPIDOGREL BISULFATE 75 MG: 75 TABLET ORAL at 08:48

## 2025-03-06 RX ADMIN — Medication 2000 UNITS: at 08:55

## 2025-03-06 RX ADMIN — METOPROLOL TARTRATE 25 MG: 25 TABLET, FILM COATED ORAL at 08:49

## 2025-03-06 RX ADMIN — CHOLESTYRAMINE 4 G: 4 POWDER, FOR SUSPENSION ORAL at 08:49

## 2025-03-06 RX ADMIN — DICYCLOMINE HYDROCHLORIDE 10 MG: 10 CAPSULE ORAL at 05:57

## 2025-03-06 RX ADMIN — BUDESONIDE AND FORMOTEROL FUMARATE DIHYDRATE 2 PUFF: 160; 4.5 AEROSOL RESPIRATORY (INHALATION) at 08:24

## 2025-03-06 RX ADMIN — PANCRELIPASE LIPASE, PANCRELIPASE PROTEASE, PANCRELIPASE AMYLASE 20000 UNITS: 20000; 63000; 84000 CAPSULE, DELAYED RELEASE ORAL at 08:48

## 2025-03-06 RX ADMIN — OXYCODONE AND ACETAMINOPHEN 1 TABLET: 325; 10 TABLET ORAL at 12:19

## 2025-03-06 RX ADMIN — TAMSULOSIN HYDROCHLORIDE 0.4 MG: 0.4 CAPSULE ORAL at 08:55

## 2025-03-06 RX ADMIN — OXYCODONE AND ACETAMINOPHEN 1 TABLET: 325; 10 TABLET ORAL at 05:57

## 2025-03-06 RX ADMIN — FERROUS SULFATE TAB EC 325 MG (65 MG FE EQUIVALENT) 325 MG: 325 (65 FE) TABLET DELAYED RESPONSE at 08:48

## 2025-03-06 RX ADMIN — LOPERAMIDE HYDROCHLORIDE 2 MG: 2 CAPSULE ORAL at 11:18

## 2025-03-06 RX ADMIN — ALUMINUM HYDROXIDE, MAGNESIUM HYDROXIDE, AND SIMETHICONE 20 ML: 200; 200; 20 SUSPENSION ORAL at 08:49

## 2025-03-06 RX ADMIN — APIXABAN 5 MG: 5 TABLET, FILM COATED ORAL at 08:48

## 2025-03-06 RX ADMIN — GUAIFENESIN 600 MG: 600 TABLET ORAL at 08:48

## 2025-03-06 RX ADMIN — Medication 2 CAPSULE: at 08:48

## 2025-03-06 RX ADMIN — BUSPIRONE HYDROCHLORIDE 5 MG: 5 TABLET ORAL at 08:48

## 2025-03-06 RX ADMIN — TIOTROPIUM BROMIDE INHALATION SPRAY 2 PUFF: 3.12 SPRAY, METERED RESPIRATORY (INHALATION) at 08:24

## 2025-03-06 RX ADMIN — BUSPIRONE HYDROCHLORIDE 5 MG: 5 TABLET ORAL at 12:59

## 2025-03-06 ASSESSMENT — ENCOUNTER SYMPTOMS
ABDOMINAL PAIN: 1
COUGH: 0
WHEEZING: 0
DIARRHEA: 0
CHOKING: 0
CHEST TIGHTNESS: 0
CONSTIPATION: 0
SINUS PRESSURE: 0
BACK PAIN: 0
RESPIRATORY NEGATIVE: 1
VOICE CHANGE: 0
VOMITING: 0
DIARRHEA: 1
SHORTNESS OF BREATH: 0
RHINORRHEA: 0
NAUSEA: 0

## 2025-03-06 ASSESSMENT — PAIN DESCRIPTION - LOCATION
LOCATION: GENERALIZED
LOCATION: ABDOMEN

## 2025-03-06 ASSESSMENT — PAIN - FUNCTIONAL ASSESSMENT: PAIN_FUNCTIONAL_ASSESSMENT: PREVENTS OR INTERFERES SOME ACTIVE ACTIVITIES AND ADLS

## 2025-03-06 ASSESSMENT — PAIN SCALES - GENERAL
PAINLEVEL_OUTOF10: 6
PAINLEVEL_OUTOF10: 10
PAINLEVEL_OUTOF10: 10

## 2025-03-06 ASSESSMENT — PAIN DESCRIPTION - DESCRIPTORS: DESCRIPTORS: ACHING

## 2025-03-06 NOTE — PLAN OF CARE
Problem: Respiratory - Adult  Goal: Achieves optimal ventilation and oxygenation  3/6/2025 0826 by Sara Rosario RCP  Outcome: Progressing  3/6/2025 0103 by Alesha Chapa RN  Outcome: Progressing  3/5/2025 2122 by Millie Collado RCP  Outcome: Progressing

## 2025-03-06 NOTE — PROGRESS NOTES
Occupational Therapy  Facility/Department: [facility]  Daily Treatment Note  NAME: Nicolas Cardenas  : 1955  MRN: [id number]    Date of Service: 3/6/2025    RN reports patient is medically stable for therapy treatment this date.    Chart reviewed prior to treatment and patient is agreeable for therapy.  All lines intact and patient positioned comfortably at end of treatment.  All patient needs addressed prior to ending therapy session.      Discharge Recommendations:  Patient would benefit from continued therapy after discharge, Continue to assess pending progress  Pt currently functioning below baseline.  Recommend daily inpatient skilled therapy at time of discharge to maximize long term outcomes and prevent re-admission. Please refer to AM-PAC score for current level of function.  OT Equipment Recommendations  Equipment Needed:  (CTA)    Patient Diagnosis(es): The primary encounter diagnosis was Altered mental status, unspecified altered mental status type. Diagnoses of Urinary tract infection associated with indwelling urethral catheter, initial encounter, Chronic pain syndrome, COPD without exacerbation (HCC), and Diarrhea, unspecified type were also pertinent to this visit.     Assessment   Assessment: Pt on bed pan prior to session with small amount of soft stool. Pt needing bed pan 3 times during session. Pt with spilled drink on self, bed and floor. Pt needing full bed change this date. Pt rolling to R side x4 times for placement and removal of bed pan. Pt needing total A with toileting and buttocks care. Pt needing mod A for grooming to comb L side of hair d/t contracted R hand. Pt washing face with wash cloth with set up. Pt needing min Ax 2 to roll to R side and mod Ax2 to roll to L side to change linen d/t contracted R hand and decreased strength maintaining side lying to remove bed pan, complete buttocks care, and to pull linen. Pt would benefit from continued OT to increase indep with  education needed;Verbalized understanding    Goals  Short Term Goals  Time Frame for Short Term Goals: by discharge, pt will  Short Term Goal 1: demo and verb good understanding of ed provided on relevant precautions, ADL techs, BUE HEP, fall prevention, EC/WS techs, pressure relief/ positioning techs, importance of OOB activity, and d/c recommendations  Short Term Goal 2: Reassess ADL transfers as approp  Short Term Goal 3: demo CGA with bed mob with rails/controls as needed  Short Term Goal 4: demo good sitting balance unsupported at EOB x 15 min for simple ADL completion and prep for transfers as able  Short Term Goal 5: demo SBA with UB ADLs in sitting and min A with LB at bed level following set up  Patient Goals   Patient goals : to go to [facility] for surgery    AM-PAC - ADL  AM-PAC Daily Activity - Inpatient   How much help is needed for putting on and taking off regular lower body clothing?: Total  How much help is needed for bathing (which includes washing, rinsing, drying)?: A Lot  How much help is needed for toileting (which includes using toilet, bedpan, or urinal)?: Total  How much help is needed for putting on and taking off regular upper body clothing?: A Lot  How much help is needed for taking care of personal grooming?: A Little  How much help for eating meals?: A Little  AM-PAC Inpatient Daily Activity Raw Score: 12  AM-PAC Inpatient ADL T-Scale Score : 30.6  ADL Inpatient CMS 0-100% Score: 66.57  ADL Inpatient CMS G-Code Modifier : CL    Therapy Time   Individual Concurrent Group Co-treatment   Time In 0948         Time Out 1011         Minutes 23            Co-treatment with PT warranted secondary to decreased safety and independence requiring 2 skilled therapy professionals to address individual discipline's goals. OT addressing preparation for ADL transfer, sitting balance for increased ADL performance, sitting/activity tolerance, functional reaching, environmental safety/scanning, fall prevention,  functional mobility for ADL transfers, ability to sequence and follow directions, bed mobility tech, and functional UE strength.       Anne-Marie Lee, JUNE, OTR/L

## 2025-03-06 NOTE — PROGRESS NOTES
Pt states \"stop talking to me and get me my pain meds\" when RN stated he is not due yet he says \"get out you lying bitch\"     Pt also verbally abused PCT Sara earlier in the shit and was corrected by RN.

## 2025-03-06 NOTE — PROGRESS NOTES
Physical Therapy  Facility/Department: [facility] MED [facility]  Rehabilitation Physical Therapy Treatment Note    NAME: Nicolas Cardenas  : 1955 (69 y.o.)  MRN: [id number]  CODE STATUS: Full Code    Date of Service: 3/6/25     Pt currently functioning below baseline.  Recommend daily inpatient skilled therapy at time of discharge to maximize long term outcomes and prevent re-admission. Please refer to AM-PAC score for current level of function.     Restrictions:  Restrictions/Precautions: Fall Risk, General Precautions, Bed Alarm, Isolation, Contact Precautions  Position Activity Restriction  Other Position/Activity Restrictions: bilat. BKA- currently unable to wear prosthetic legs; IV, dailey.     SUBJECTIVE  Subjective: Patient up supine in bed on bed pan upon therapists arrival.  Patient calling out \"get me off it is killing me\" upon therapist arrival  RN states patient is appropriate for PT treatment this date.       OBJECTIVE  Cognition  Overall Cognitive Status: Exceptions  Arousal/Alertness: Appears intact  Following Commands: Follows one step commands with repetition  Attention Span: Difficulty dividing attention;Attends with cues to redirect  Memory: Decreased recall of recent events  Safety Judgement: Decreased awareness of need for safety;Decreased awareness of need for assistance  Problem Solving: Assistance required to implement solutions;Assistance required to identify errors made;Assistance required to correct errors made;Assistance required to generate solutions  Insights: Decreased awareness of deficits  Initiation: Requires cues for some  Sequencing: Requires cues for some  Orientation  Overall Orientation Status: Within Functional Limits  Orientation Level: Oriented to person;Oriented to place    Functional Mobility  Bed Mobility  Overall Assistance Level: Moderate Assistance;Requires x 2 Assistance  Additional Factors: Set-up;Verbal cues;Increased time to complete;Head of bed raised;With

## 2025-03-06 NOTE — PROGRESS NOTES
Pt discharged to [facility] in stable condition with belongings  Discharge instructions given  Pt denies having any further questions at this time  Locked up home medication(s)/personal items given to patient at discharge  Patient/family state they have everything they were admitted with.    RN called report to Caty BONNER at [facility]

## 2025-03-06 NOTE — PROGRESS NOTES
[facility]  Office: [phone]  Baldomero Maldonado DO, Luis Valdivia DO, Eric Rojas DO, Rajinder Chiu DO, Erica Daugherty MD, Carolina Hernandez MD, Haleigh Sorto MD, Hannah Blum MD,  Isael Velazco MD, Cora Coughlin MD, Alex Ferrell MD,  Chela Doan DO, Trent Kearns MD, Manolo Potter MD, Hans Maldonado DO, Mei Tolbert MD,  Gianni De La Rosa DO, Aranza Wakefield MD, Magdalena Dill MD, Mary Ann Cheek MD, Jenny Williamson MD,  Panfilo Hall MD, Dorcas Gillis MD, Nato Gunter MD, Opal Davis MD, Red Small MD, Giana Larsen MD, Trenton Wong DO, Joe Franklin MD, Chela Whitney MD, Mohsin Reza, MD, Shirley Waterhouse, CNP,  Xena Harrell CNP, Trenton Campbell, CNP,  Celena Corona, ANIYAH, Terrie Mathur, CNP, Dalia Lopes, CNP, Chiquita Manuel, CNP, Ahn Kolb CNP, AMITA Casanova-C, Gris Thorpe, CNP, Reymundo Todd, CNP,  Cristal Padilla, CNP, Annette Kimble, CNP, Elizabeth Clemente, CNP,  Lea Fletcher, CNS, Kelin Hernandez CNP, Norma Soliman CNP,   Lima Boyle, CNP       [facility]      Daily Progress Note     Admit Date: 3/3/2025  Bed/Room No.  2022/2022-01  Admitting Physician : Haleigh Sorto MD  Code Status :Full Code  Hospital Day:  LOS: 3 days   Chief Complaint:     Chief Complaint   Patient presents with    Altered Mental Status     Principal Problem:    Complicated UTI (urinary tract infection)  Active Problems:    Type 2 diabetes mellitus with diabetic polyneuropathy, with long-term current use of insulin (HCC)    Esophageal cancer (HCC)    DM type 2 with diabetic peripheral neuropathy (HCC)    COPD without exacerbation (HCC)    HLD (hyperlipidemia)    Gastroesophageal reflux disease without esophagitis    Chronic pain syndrome    PAD (peripheral artery disease)    S/P BKA (below knee amputation) bilateral (HCC)    Essential hypertension    History of pulmonary embolism - 2017    Anemia, normocytic normochromic    Drug-induced    Cardiovascular:      Rate and Rhythm: Normal rate and regular rhythm.      Pulses:           Dorsalis pedis pulses are 2+ on the right side and 2+ on the left side.      Heart sounds: Normal heart sounds. No murmur heard.  Pulmonary:      Effort: Pulmonary effort is normal.      Breath sounds: Normal breath sounds. No wheezing or rales.   Abdominal:      Palpations: Abdomen is soft. There is no mass.      Tenderness: There is no abdominal tenderness.   Musculoskeletal:      Cervical back: Full passive range of motion without pain and neck supple.      Right Lower Extremity: Right leg is amputated below knee.      Left Lower Extremity: Left leg is amputated below knee.   Lymphadenopathy:      Head:      Right side of head: No submandibular adenopathy.      Left side of head: No submandibular adenopathy.      Cervical: No cervical adenopathy.   Skin:     General: Skin is warm.   Neurological:      Mental Status: He is alert and oriented to person, place, and time.      Motor: No tremor.   Psychiatric:         Behavior: Behavior is cooperative.           Laboratory findings:    Recent Labs     03/03/25  1830 03/04/25  0625   WBC 14.3* 10.6   HGB 8.6* 7.6*   HCT 28.4* 25.8*   * 807*   INR 1.0  --      Recent Labs     03/03/25  1830 03/04/25  0625 03/04/25  1106    142  --    K 5.0 4.4  --    * 114*  --    CO2 17* 20  --    GLUCOSE 175* 116* 84   BUN 32* 27*  --    CREATININE 1.3* 1.2  --    CALCIUM 8.7* 8.4*  --      Recent Labs     03/03/25  1830   AST 13   ALT <5*   ALKPHOS 152*   BILITOT <0.2   LIPASE 17          Spec Grav, UA   Date Value Ref Range Status   07/18/2016 1.025  Final     Protein, UA   Date Value Ref Range Status   03/03/2025 3+ (A) NEGATIVE mg/dL Final     RBC, UA   Date Value Ref Range Status   03/03/2025 TOO NUMEROUS TO COUNT 0 - 2 /HPF Final     Blood, UA POC   Date Value Ref Range Status   07/18/2016 trace-intact  Final     Bacteria, UA   Date Value Ref Range Status    01/02/2025 MODERATE (A) None Final     Nitrite, Urine   Date Value Ref Range Status   03/03/2025 NEGATIVE NEGATIVE Final     WBC, UA   Date Value Ref Range Status   03/03/2025 10 TO 20 0 - 5 /HPF Final     Leukocyte Esterase, Urine   Date Value Ref Range Status   03/03/2025 MOD (A) NEGATIVE Final       Imaging / Clinical Data :-   CT head without contrast   Final Result   1. No acute intracranial findings.   2. Small area of encephalomalacia in the right frontal lobe which is   unchanged and could represent an old infarct or traumatic injury.   3. Mucosal thickening and air-fluid level in the right maxillary sinus which   could represent acute sinusitis.         XR ACUTE ABD SERIES CHEST 1 VW   Final Result   1. Mild patchy and linear opacities at the left lung base could be related   atelectasis or pneumonia.   2. The limited evaluation of the abdomen and pelvis as the supine image was   not acquired.  There is no free air or evidence of bowel obstruction.   3. Right ureteral stent in place and a 5 mm stone overlying the lower pole of   the right kidney.              Clinical Course : unchanged  Assessment and Plan  :        Acute cystitis ruled out,, has chronic cystitis - secondary to chronic indwelling Quesada for chronic urinary retention s/p meatal dilation before. Follows Urology at [facility]. Consult Urology, has R ureteral stent in place .  antibiotics stopped.  Recent fungemia at [facility]. Completed  micafingin, followed by  Diflucan as previously recommended per ID at [facility].   COPD exacerbation -bronchodilators, antibiotics.  IDDM -Humalog as needed.  Patient on low-dose Lantus at home.  Hypertension-well-controlled.  H/O Recurrent CDiff -no current diarrhea.  H/O R kidney stone with prior stents   PAD s/p b/l BKA   Chronic pancreatic insufficiency -pancreatic enzyme supplement  H/O Alcohol Abuse   Chronic Pain syndrome -requesting IV Dilaudid and morphine.  use oral medications first.  Benign abdominal

## 2025-03-06 NOTE — PROGRESS NOTES
Nicolas Gao MD   Urology Progress Note            Subjective: Follow-up neurogenic bladder renal lithiasis    Patient Vitals for the past 24 hrs:   BP Temp Temp src Pulse Resp SpO2 Height Weight   03/06/25 0627 -- -- -- -- 16 -- -- --   03/06/25 0336 -- -- -- -- -- -- -- 51.6 kg (113 lb 11.2 oz)   03/05/25 2119 -- -- -- -- 16 -- -- --   03/05/25 2052 117/75 97.5 °F (36.4 °C) Axillary 93 16 96 % -- --   03/05/25 1539 -- -- -- -- -- -- 1.829 m (6') --   03/05/25 1510 -- -- -- -- 18 -- -- --   03/05/25 1440 -- -- -- -- 16 -- -- --   03/05/25 0859 -- -- -- -- 16 -- -- --   03/05/25 0804 -- -- -- 88 17 95 % -- --   03/05/25 0715 131/74 98.2 °F (36.8 °C) Oral 92 18 95 % -- --       Intake/Output Summary (Last 24 hours) at 3/6/2025 0712  Last data filed at 3/6/2025 0005  Gross per 24 hour   Intake --   Output 1750 ml   Net -1750 ml       Recent Labs     03/03/25  1830 03/04/25  0625   WBC 14.3* 10.6   HGB 8.6* 7.6*   HCT 28.4* 25.8*   MCV 87.7 88.7   * 807*     Recent Labs     03/03/25  1830 03/04/25  0625    142   K 5.0 4.4   * 114*   CO2 17* 20   BUN 32* 27*   CREATININE 1.3* 1.2       Recent Labs     03/03/25  1803   COLORU Yellow   PHUR 5.5   WBCUA 10 TO 20   RBCUA TOO NUMEROUS TO COUNT   LEUKOCYTESUR MOD*   UROBILINOGEN Normal   BILIRUBINUR NEGATIVE       Additional Lab/culture results:    Physical Exam: Patient alert not in acute distress, ureteral stents in good position Confirmed by CT imaging  Patient with recent fungemia treated with micafungin and transitioning to fluconazole.  Patient with bladder wall thickening neurogenic bladder requiring an indwelling Quesada ureteral stent in proper position as mentioned above with bilateral renal calculi     Interval Imaging Findings:    Impression:    Patient Active Problem List   Diagnosis    Type 2 diabetes mellitus with diabetic polyneuropathy, with long-term current use of insulin (HCC)    Neuropathic pain, leg     Cellulitis of left lower extremity    History of below knee amputation, left (HCC)    Leg ulcer, left, with fat layer exposed (HCC)    S/P amputation    Tobacco abuse    Pneumonia of right lower lobe due to infectious organism    Abdominal pain    Cannabis abuse    Parapneumonic effusion    Hiatus hernia -large    Metabolic encephalopathy    Altered mental status    Alcoholic intoxication without complication    Acute encephalopathy    Depression    History of Clostridioides difficile colitis    Bilateral kidney stones    Ureteric stone    Failure to thrive in adult    Wound of left lower extremity    Chronic diarrhea    Leg ulcer, left, limited to breakdown of skin (HCC)    Urinary retention    Acute cystitis without hematuria    Secondary hypercoagulable state    Chronic pancreatitis (HCC)    Urinary tract infection without hematuria    Dysuria    Clostridioides difficile infection    Diarrhea    Community acquired pneumonia    Severe malnutrition    Candida infection    Complicated UTI (urinary tract infection)       Plan: Patient has follow-up appointment with [facility] urology for stent removal and laser lithotripsy for renal calculi    Nicolas Gao MD  7:12 AM 3/6/2025

## 2025-03-06 NOTE — PLAN OF CARE
Problem: Chronic Conditions and Co-morbidities  Goal: Patient's chronic conditions and co-morbidity symptoms are monitored and maintained or improved  Outcome: Progressing  Flowsheets (Taken 3/5/2025 2000)  Care Plan - Patient's Chronic Conditions and Co-Morbidity Symptoms are Monitored and Maintained or Improved:   Monitor and assess patient's chronic conditions and comorbid symptoms for stability, deterioration, or improvement   Collaborate with multidisciplinary team to address chronic and comorbid conditions and prevent exacerbation or deterioration   Update acute care plan with appropriate goals if chronic or comorbid symptoms are exacerbated and prevent overall improvement and discharge     Problem: Discharge Planning  Goal: Discharge to home or other facility with appropriate resources  Outcome: Progressing  Flowsheets (Taken 3/5/2025 2000)  Discharge to home or other facility with appropriate resources:   Arrange for needed discharge resources and transportation as appropriate   Identify barriers to discharge with patient and caregiver   Identify discharge learning needs (meds, wound care, etc)   Refer to discharge planning if patient needs post-hospital services based on physician order or complex needs related to functional status, cognitive ability or social support system     Problem: Metabolic/Fluid and Electrolytes - Adult  Goal: Electrolytes maintained within normal limits  Outcome: Progressing  Flowsheets (Taken 3/5/2025 2000)  Electrolytes maintained within normal limits: Monitor labs and assess patient for signs and symptoms of electrolyte imbalances     Problem: Hematologic - Adult  Goal: Maintains hematologic stability  Outcome: Progressing  Flowsheets (Taken 3/5/2025 2000)  Maintains hematologic stability: Assess for signs and symptoms of bleeding or hemorrhage     Problem: Genitourinary - Adult  Goal: Urinary catheter remains patent  Outcome: Progressing  Flowsheets (Taken 3/5/2025  2000)  Urinary catheter remains patent: Assess patency of urinary catheter

## 2025-03-06 NOTE — PLAN OF CARE
Problem: Safety - Adult  Goal: Free from fall injury  Outcome: Adequate for Discharge     Problem: Chronic Conditions and Co-morbidities  Goal: Patient's chronic conditions and co-morbidity symptoms are monitored and maintained or improved  Outcome: Adequate for Discharge  Flowsheets (Taken 3/6/2025 0824)  Care Plan - Patient's Chronic Conditions and Co-Morbidity Symptoms are Monitored and Maintained or Improved:   Monitor and assess patient's chronic conditions and comorbid symptoms for stability, deterioration, or improvement   Collaborate with multidisciplinary team to address chronic and comorbid conditions and prevent exacerbation or deterioration   Update acute care plan with appropriate goals if chronic or comorbid symptoms are exacerbated and prevent overall improvement and discharge     Problem: Discharge Planning  Goal: Discharge to home or other facility with appropriate resources  Outcome: Adequate for Discharge  Flowsheets (Taken 3/6/2025 0824)  Discharge to home or other facility with appropriate resources:   Arrange for needed discharge resources and transportation as appropriate   Identify barriers to discharge with patient and caregiver   Identify discharge learning needs (meds, wound care, etc)     Problem: Pain  Goal: Verbalizes/displays adequate comfort level or baseline comfort level  Outcome: Adequate for Discharge     Problem: ABCDS Injury Assessment  Goal: Absence of physical injury  Outcome: Adequate for Discharge     Problem: Skin/Tissue Integrity  Goal: Skin integrity remains intact  Description: 1.  Monitor for areas of redness and/or skin breakdown  2.  Assess vascular access sites hourly  3.  Every 4-6 hours minimum:  Change oxygen saturation probe site  4.  Every 4-6 hours:  If on nasal continuous positive airway pressure, respiratory therapy assess nares and determine need for appliance change or resting period  Outcome: Adequate for Discharge  Flowsheets (Taken 3/6/2025 0824)  Skin  RCP  Outcome: Progressing  Flowsheets (Taken 3/6/2025 0824 by Silvia Gaytan, RN)  Achieves optimal ventilation and oxygenation:   Assess for changes in respiratory status   Assess for changes in mentation and behavior   Position to facilitate oxygenation and minimize respiratory effort

## 2025-03-06 NOTE — CARE COORDINATION
Social work: Authorization obtained for return to [facility].     Patient to dc to [facility] via [facility] at 3:15P.  # for RN report: [phone]. Completed LOREE faxed to [phone].  Informed patient, RN and facility of dc time, agreeable to plan. Writer also informed Stevie/Otilio Elmore of pt's appointment at 9:00AM at [facility] and that transportation will need arranged.

## 2025-03-06 NOTE — PROGRESS NOTES
[facility]  Progress Note    Nicolas Cardenas  MRN: [id number]  Date: 3/6/2025  LOS: 3     Reason for F/U :   UTI/pneumonia    Impression :   Recent Candida glabrata fungemia secondary to complicated urinary tract infection, 2/15/2025  Patient to complete micafungin therapy 3/5/2025 then transition to fluconazole 200 mg daily x 14 days per previous plan from [facility]  Linear opacity in the left lung base concerning for pneumonia  Recent imaging from last week did show left base atelectasis  Recent influenza A respiratory infection, 2/24/2025  Status post Tamiflu x 5 days  Chronic kidney disease stage IIIa  Diabetes mellitus type 2  Chronic venous insufficiency status post bilateral BKA  Chronic pancreatitis  History of C. difficile infection, most recent September 2024    Recommendations:   Patient has been started on IV ceftriaxone and azithromycin  Clinically doubt the patient has pneumonia, he does not have any signs of respiratory distress at this time.  He did recently have influenza A [2/24/2025] and chest imaging did show left basilar atelectasis.  Patient does not have any respiratory signs of infection  I doubt patient has urinary tract infection, urine culture was negative  At this time, especially in light of the patient's history of C. difficile and unlikelihood of an acute infection at this time, I would recommend monitoring the patient off antibiotics  Patient is to complete his micafungin therapy today then can be transition to fluconazole x 14 days as previously planned per [facility]  Blood cultures negative  Patient does have a follow-up appointment with [facility] urology for stent removal and laser lithotripsy for the renal calculi  Patient is okay for discharge from an infectious disease standpoint when okay with other services, we will sign off, please call us if needed     Infection Control Recommendations:   Contact precautions    Discharge Planning:     Patient will need Midline Catheter  RT    pantoprazole  40 mg Oral QAM AC    micafungin  100 mg IntraVENous Daily    busPIRone  5 mg Oral TID    sodium chloride flush  5-40 mL IntraVENous 2 times per day    HYDROcodone-acetaminophen  1 tablet Oral Once       Electronically signed by MARCELO ROMAN CNP on 3/6/2025 at 8:40 AM      [facility]  MARCELO ROMAN CNP  [employer]  OFFICE: [phone]    Thank you for allowing us to participate in the care of this patient. Please call with questions.    This note is created with the assistance of a speech recognition program.  While intending to generate a document that actually reflects the content of the visit, the document can still have some errors including those of syntax and sound a like substitutions which may escape proof reading.  In such instances, actual meaning can be extrapolated by contextual diversion.

## 2025-03-06 NOTE — PROGRESS NOTES
There is an order for dailey removal. Pt has refused twice. Dr. Gao notified. Follow up appointment with [facility] urology tomorrow at 9am.

## 2025-03-07 NOTE — DISCHARGE SUMMARY
[facility]  Office: [phone]  Baldomero Maldonado DO, Luis Valdivia DO, Eric Rojas DO, Rajinder Chiu DO, Erica Daugherty MD, Carolina Hernandez MD, Haleigh Sorto MD, Hannah Blum MD,  Isael Velazco MD, Cora Coughlin MD, Alex Ferrell MD,  Chela Doan DO, Trent Kearns MD, Manolo Potter MD, Hans Maldonado DO, Mei Tolbert MD,  Gianni De La Rosa DO, Aranza Wakefield MD, Magdalena Dill MD, Mary Ann Cheek MD, Jenny Williamson MD,  Panfilo Hall MD, Dorcas Gillis MD, Nato Gunter MD, Opal Davis MD, Red Small MD, Giana Larsen MD, Trenton Wong DO, Joe Franklin MD, Chela Whitney MD, Mohsin Reza, MD, Shirley Waterhouse, CNP,  Xena Harrell CNP, Trenton Campbell, CNP,  Celena Corona, [facility], Terrie Mathur, CNP, Dalia Lopes, CNP, Chiquita Manuel, CNP, Anh Kolb, CNP, AMITA Casanova-C, Gris Thorpe, CNP, Reymundo Todd, CNP,  Cristal Padilla, CNP, Annette Kimble, CNP, Elizabeth Clemente, CNP,  Lea Fletcher, CNS, Kelin Hernandez CNP, Norma Soliman, CNP,   Lima Boyle, CNP                [facility]      Discharge Summary     Patient ID: Nicolas Cardenas  :  1955   MRN: [id number]     ACCOUNT:  [id number]   Patient Location :   Patient's PCP: Rosa Pond APRN - NP  Admit Date: 3/3/2025   Discharge Date: 3/6/2025     Length of Stay: 3  Code Status:  Prior  Admitting Physician: Haleigh Sorto MD  Discharge Physician: Haleigh Sorto MD     Active Discharge Diagnosis :     Primary Problem  Complicated UTI (urinary tract infection)      Hospital Problems  Active Hospital Problems    Diagnosis Date Noted    Severe malnutrition [E43] 2025    Complicated UTI (urinary tract infection) [N39.0] 2025    Community acquired pneumonia [J18.9] 10/11/2024    Secondary hypercoagulable state [D68.69] 2024    Chronic pancreatitis (HCC) [K86.1] 2024    Moderate malnutrition [E44.0] 2024    Chronic diarrhea [K52.9]  Value Date    TSH 0.93 09/30/2024     No results found for: \"HAV\", \"HEPAIGM\", \"HEPBIGM\", \"HEPBCAB\", \"HBEAG\", \"HEPCAB\"  Lab Results   Component Value Date/Time    COLORU Yellow 03/03/2025 06:03 PM    NITRU NEGATIVE 03/03/2025 06:03 PM    GLUCOSEU NEGATIVE 03/03/2025 06:03 PM    GLUCOSEU TRACE 03/05/2012 10:49 AM    KETUA NEGATIVE 03/03/2025 06:03 PM    UROBILINOGEN Normal 03/03/2025 06:03 PM    BILIRUBINUR NEGATIVE 03/03/2025 06:03 PM     Lab Results   Component Value Date    LABA1C 7.4 (H) 09/30/2024     Lab Results   Component Value Date     09/30/2024     Lab Results   Component Value Date    INR 1.0 03/03/2025    INR 2.0 10/21/2024    INR 1.3 10/20/2024    PROTIME 13.9 03/03/2025    PROTIME 22.9 (H) 10/21/2024    PROTIME 16.4 (H) 10/20/2024       CT head without contrast    Result Date: 3/3/2025  1. No acute intracranial findings. 2. Small area of encephalomalacia in the right frontal lobe which is unchanged and could represent an old infarct or traumatic injury. 3. Mucosal thickening and air-fluid level in the right maxillary sinus which could represent acute sinusitis.     XR ACUTE ABD SERIES CHEST 1 VW    Result Date: 3/3/2025  1. Mild patchy and linear opacities at the left lung base could be related atelectasis or pneumonia. 2. The limited evaluation of the abdomen and pelvis as the supine image was not acquired.  There is no free air or evidence of bowel obstruction. 3. Right ureteral stent in place and a 5 mm stone overlying the lower pole of the right kidney.     XR abdomen 1 view    Result Date: 3/1/2025   Chronic findings as above Electronically signed: Ahsan Tarango.            Consultations:    Consults:     Final Specialist Recommendations/Findings:   IP CONSULT TO HOSPITALIST  IP CONSULT TO INFECTIOUS DISEASES  IP CONSULT TO DIETITIAN  IP CONSULT TO UROLOGY      The patient was seen and examined on day of discharge and this discharge summary is in conjunction with any daily progress note from  2.5-0.025 MG per tablet  oxyCODONE-acetaminophen  MG per tablet       Information about where to get these medications is not yet available    Ask your nurse or doctor about these medications  albuterol sulfate  (90 Base) MCG/ACT inhaler  Aluminum-Magnesium-Simethicone 200-200-20 MG/5ML Susp  apixaban 5 MG Tabs tablet  benzonatate 100 MG capsule  lactobacillus capsule         Time Spent on discharge is  33 mins in patient examination, evaluation, counseling as well as medication reconciliation, prescriptions for required medications, discharge plan and follow up.    Electronically signed by   Haleigh Sorto MD  3/7/2025        Thank you Rosa Arrington, APRN - NP for the opportunity to be involved in this patient's care.     (This note is created with the assistance of a speech recognition program. While intending to generate a document that actually reflects the content of the visit, the document can still have some errors including those of syntax and sound a like substitutions which may escape proof reading. In such instances, actual meaning can be extrapolated by contextual diversion.)

## 2025-03-08 LAB
MICROORGANISM SPEC CULT: NORMAL
MICROORGANISM SPEC CULT: NORMAL
SERVICE CMNT-IMP: NORMAL
SERVICE CMNT-IMP: NORMAL
SPECIMEN DESCRIPTION: NORMAL
SPECIMEN DESCRIPTION: NORMAL

## 2025-03-09 LAB
EKG ATRIAL RATE: 80 BPM
EKG P AXIS: 56 DEGREES
EKG P-R INTERVAL: 136 MS
EKG Q-T INTERVAL: 364 MS
EKG QRS DURATION: 70 MS
EKG QTC CALCULATION (BAZETT): 419 MS
EKG R AXIS: 50 DEGREES
EKG T AXIS: 66 DEGREES
EKG VENTRICULAR RATE: 80 BPM

## 2025-03-10 ENCOUNTER — HOSPITAL ENCOUNTER (OUTPATIENT)
Age: 70
Setting detail: SPECIMEN
Discharge: HOME | End: 2025-03-10

## 2025-03-10 LAB
ANION GAP SERPL CALCULATED.3IONS-SCNC: 11 MMOL/L (ref 9–16)
BUN SERPL-MCNC: 29 MG/DL (ref 8–23)
CALCIUM SERPL-MCNC: 8.8 MG/DL (ref 8.8–10.2)
CHLORIDE SERPL-SCNC: 108 MMOL/L (ref 98–107)
CO2 SERPL-SCNC: 18 MMOL/L (ref 20–31)
CREAT SERPL-MCNC: 1.3 MG/DL (ref 0.7–1.2)
ERYTHROCYTE [DISTWIDTH] IN BLOOD BY AUTOMATED COUNT: 17.6 % (ref 11.8–14.4)
GFR, ESTIMATED: 63 ML/MIN/1.73M2
GLUCOSE SERPL-MCNC: 266 MG/DL (ref 82–115)
HCT VFR BLD AUTO: 27.2 % (ref 40.7–50.3)
HGB BLD-MCNC: 8 G/DL (ref 13–17)
MCH RBC QN AUTO: 26.5 PG (ref 25.2–33.5)
MCHC RBC AUTO-ENTMCNC: 29.4 G/DL (ref 28.4–34.8)
MCV RBC AUTO: 90.1 FL (ref 82.6–102.9)
NRBC BLD-RTO: 0 PER 100 WBC
PLATELET # BLD AUTO: 697 K/UL (ref 138–453)
PMV BLD AUTO: 9.4 FL (ref 8.1–13.5)
POTASSIUM SERPL-SCNC: 4.7 MMOL/L (ref 3.7–5.3)
RBC # BLD AUTO: 3.02 M/UL (ref 4.21–5.77)
SODIUM SERPL-SCNC: 137 MMOL/L (ref 136–145)
WBC OTHER # BLD: 13.2 K/UL (ref 3.5–11.3)

## 2025-03-10 PROCEDURE — 36415 COLL VENOUS BLD VENIPUNCTURE: CPT

## 2025-03-10 PROCEDURE — 80048 BASIC METABOLIC PNL TOTAL CA: CPT

## 2025-03-10 PROCEDURE — 85027 COMPLETE CBC AUTOMATED: CPT

## 2025-03-17 ENCOUNTER — HOSPITAL ENCOUNTER (OUTPATIENT)
Age: 70
Setting detail: SPECIMEN
Discharge: HOME | End: 2025-03-17

## 2025-03-17 LAB
ANION GAP SERPL CALCULATED.3IONS-SCNC: 11 MMOL/L (ref 9–16)
BUN SERPL-MCNC: 28 MG/DL (ref 8–23)
CALCIUM SERPL-MCNC: 8.8 MG/DL (ref 8.8–10.2)
CHLORIDE SERPL-SCNC: 107 MMOL/L (ref 98–107)
CO2 SERPL-SCNC: 24 MMOL/L (ref 20–31)
CREAT SERPL-MCNC: 1.2 MG/DL (ref 0.7–1.2)
ERYTHROCYTE [DISTWIDTH] IN BLOOD BY AUTOMATED COUNT: 18.2 % (ref 11.8–14.4)
GFR, ESTIMATED: 64 ML/MIN/1.73M2
GLUCOSE SERPL-MCNC: 98 MG/DL (ref 82–115)
HCT VFR BLD AUTO: 30.1 % (ref 40.7–50.3)
HGB BLD-MCNC: 8.7 G/DL (ref 13–17)
MCH RBC QN AUTO: 26.8 PG (ref 25.2–33.5)
MCHC RBC AUTO-ENTMCNC: 28.9 G/DL (ref 28.4–34.8)
MCV RBC AUTO: 92.6 FL (ref 82.6–102.9)
NRBC BLD-RTO: 0 PER 100 WBC
PLATELET # BLD AUTO: 553 K/UL (ref 138–453)
PMV BLD AUTO: 9.6 FL (ref 8.1–13.5)
POTASSIUM SERPL-SCNC: 4.5 MMOL/L (ref 3.7–5.3)
RBC # BLD AUTO: 3.25 M/UL (ref 4.21–5.77)
SODIUM SERPL-SCNC: 142 MMOL/L (ref 136–145)
WBC OTHER # BLD: 13.4 K/UL (ref 3.5–11.3)

## 2025-03-17 PROCEDURE — 85027 COMPLETE CBC AUTOMATED: CPT

## 2025-03-17 PROCEDURE — 36415 COLL VENOUS BLD VENIPUNCTURE: CPT

## 2025-03-17 PROCEDURE — 80048 BASIC METABOLIC PNL TOTAL CA: CPT

## 2025-03-24 ENCOUNTER — HOSPITAL ENCOUNTER (OUTPATIENT)
Age: 70
Setting detail: SPECIMEN
Discharge: HOME | End: 2025-03-24

## 2025-03-24 LAB
ANION GAP SERPL CALCULATED.3IONS-SCNC: 12 MMOL/L (ref 9–16)
BUN SERPL-MCNC: 37 MG/DL (ref 8–23)
CALCIUM SERPL-MCNC: 8.9 MG/DL (ref 8.8–10.2)
CHLORIDE SERPL-SCNC: 105 MMOL/L (ref 98–107)
CO2 SERPL-SCNC: 22 MMOL/L (ref 20–31)
CREAT SERPL-MCNC: 1.3 MG/DL (ref 0.7–1.2)
ERYTHROCYTE [DISTWIDTH] IN BLOOD BY AUTOMATED COUNT: 18 % (ref 11.8–14.4)
GFR, ESTIMATED: 62 ML/MIN/1.73M2
GLUCOSE SERPL-MCNC: 139 MG/DL (ref 82–115)
HCT VFR BLD AUTO: 26.9 % (ref 40.7–50.3)
HGB BLD-MCNC: 8 G/DL (ref 13–17)
MCH RBC QN AUTO: 27 PG (ref 25.2–33.5)
MCHC RBC AUTO-ENTMCNC: 29.7 G/DL (ref 28.4–34.8)
MCV RBC AUTO: 90.9 FL (ref 82.6–102.9)
NRBC BLD-RTO: 0 PER 100 WBC
PLATELET # BLD AUTO: 557 K/UL (ref 138–453)
PMV BLD AUTO: 9.7 FL (ref 8.1–13.5)
POTASSIUM SERPL-SCNC: 4.1 MMOL/L (ref 3.7–5.3)
RBC # BLD AUTO: 2.96 M/UL (ref 4.21–5.77)
SODIUM SERPL-SCNC: 138 MMOL/L (ref 136–145)
WBC OTHER # BLD: 16.4 K/UL (ref 3.5–11.3)

## 2025-03-24 PROCEDURE — 80048 BASIC METABOLIC PNL TOTAL CA: CPT

## 2025-03-24 PROCEDURE — 85027 COMPLETE CBC AUTOMATED: CPT

## 2025-03-24 PROCEDURE — 36415 COLL VENOUS BLD VENIPUNCTURE: CPT

## 2025-03-31 ENCOUNTER — HOSPITAL ENCOUNTER (OUTPATIENT)
Age: 70
Setting detail: SPECIMEN
Discharge: HOME | End: 2025-03-31

## 2025-03-31 LAB
ANION GAP SERPL CALCULATED.3IONS-SCNC: 13 MMOL/L (ref 9–16)
BUN SERPL-MCNC: 29 MG/DL (ref 8–23)
CALCIUM SERPL-MCNC: 8.7 MG/DL (ref 8.8–10.2)
CHLORIDE SERPL-SCNC: 106 MMOL/L (ref 98–107)
CO2 SERPL-SCNC: 19 MMOL/L (ref 20–31)
CREAT SERPL-MCNC: 1.5 MG/DL (ref 0.7–1.2)
ERYTHROCYTE [DISTWIDTH] IN BLOOD BY AUTOMATED COUNT: 17.2 % (ref 11.8–14.4)
GFR, ESTIMATED: 49 ML/MIN/1.73M2
GLUCOSE SERPL-MCNC: 143 MG/DL (ref 82–115)
HCT VFR BLD AUTO: 29.3 % (ref 40.7–50.3)
HGB BLD-MCNC: 8.4 G/DL (ref 13–17)
MCH RBC QN AUTO: 27 PG (ref 25.2–33.5)
MCHC RBC AUTO-ENTMCNC: 28.7 G/DL (ref 28.4–34.8)
MCV RBC AUTO: 94.2 FL (ref 82.6–102.9)
NRBC BLD-RTO: 0 PER 100 WBC
PLATELET # BLD AUTO: 619 K/UL (ref 138–453)
PMV BLD AUTO: 9.1 FL (ref 8.1–13.5)
POTASSIUM SERPL-SCNC: 4.1 MMOL/L (ref 3.7–5.3)
RBC # BLD AUTO: 3.11 M/UL (ref 4.21–5.77)
SODIUM SERPL-SCNC: 139 MMOL/L (ref 136–145)
WBC OTHER # BLD: 26.5 K/UL (ref 3.5–11.3)

## 2025-03-31 PROCEDURE — 85027 COMPLETE CBC AUTOMATED: CPT

## 2025-03-31 PROCEDURE — 36415 COLL VENOUS BLD VENIPUNCTURE: CPT

## 2025-03-31 PROCEDURE — 80048 BASIC METABOLIC PNL TOTAL CA: CPT

## 2025-04-07 ENCOUNTER — HOSPITAL ENCOUNTER (OUTPATIENT)
Age: 70
Setting detail: SPECIMEN
Discharge: HOME | End: 2025-04-07

## 2025-06-09 NOTE — DISCHARGE INSTR - COC
Problem: Adult Inpatient Plan of Care  Goal: Plan of Care Review  Outcome: Progressing  Flowsheets (Taken 6/9/2025 1619)  Progress: improving  Outcome Evaluation: Patient has been cooperative during shift. No complaints of pain, nausea or SOA. AOx2, assist x1, room air, SR/SB. Cardiac EP consulted. Zio patch at discharge. Precert started for SNF. Will continue to monitor and assist patient as needed.  Plan of Care Reviewed With: patient  Goal: Patient-Specific Goal (Individualized)  Outcome: Progressing  Goal: Absence of Hospital-Acquired Illness or Injury  Outcome: Progressing  Intervention: Identify and Manage Fall Risk  Recent Flowsheet Documentation  Taken 6/9/2025 1600 by Jose Villagran RN  Safety Promotion/Fall Prevention:   assistive device/personal items within reach   fall prevention program maintained   nonskid shoes/slippers when out of bed   safety round/check completed  Taken 6/9/2025 1330 by Jose Villagran RN  Safety Promotion/Fall Prevention:   assistive device/personal items within reach   fall prevention program maintained   nonskid shoes/slippers when out of bed   safety round/check completed  Taken 6/9/2025 1200 by Jose Villagran RN  Safety Promotion/Fall Prevention:   assistive device/personal items within reach   fall prevention program maintained   nonskid shoes/slippers when out of bed   safety round/check completed  Taken 6/9/2025 1000 by Jose Villagran RN  Safety Promotion/Fall Prevention:   assistive device/personal items within reach   fall prevention program maintained   nonskid shoes/slippers when out of bed   safety round/check completed  Taken 6/9/2025 0911 by Jose Villagran RN  Safety Promotion/Fall Prevention:   assistive device/personal items within reach   fall prevention program maintained   nonskid shoes/slippers when out of bed   safety round/check completed  Intervention: Prevent Skin Injury  Recent Flowsheet Documentation  Taken 6/9/2025 1600 by Sparkle  Continuity of Care Form    Patient Name: Tanvi Tinoco   :  1955  MRN:  [id number]    Admit date:  2021  Discharge date:  ***    Code Status Order: Full Code   Advance Directives:   Advance Care Flowsheet Documentation       Date/Time Healthcare Directive Type of Healthcare Directive Copy in [address] Agent's Name Healthcare Agent's Phone Number    21 [phone]  No, patient does not have an advance directive for healthcare treatment -- -- -- -- --            Admitting Physician:  Edna Kilgore MD  PCP: Cisco Barry DO    Discharging Nurse: [facility] Unit/Room#:   Discharging Unit Phone Number: [phone]    Emergency Contact:   Extended Emergency Contact Information  Primary Emergency Contact: Shaila Paulino 81, Kris Ruiz 88 Phone: [phone]  Relation: Child   needed?  No  Secondary Emergency Contact: Fartun Baeza  Address[place]   Home Phone: [phone]  Relation: Parent    Past Surgical History:  Past Surgical History:   Procedure Laterality Date    COLONOSCOPY  2015    hyperplastic polyp    COLONOSCOPY  2017    ESOPHAGECTOMY      cancer    FOOT DEBRIDEMENT Left 2021    I&D LEFT FOOT WITH REMOVAL OF NONVIABLE BONE AND SOFT TISSUE performed by Rebecca Jasso DPM at [facility] Left 2021    LEFT FOOT DEBRIDEMENT WITH REMOVAL ALL NON VIABLE SOFT TISSUE AND BONE performed by Rebecca Jasso DPM at [address] Right 2016    I & D heel    FOOT SURGERY Right 2016    I & D    FRACTURE SURGERY Left 2015    humerus left, left leg    IR INS PICC VAD W SQ PORT GREATER THAN 5  2020    IR INS PICC VAD W SQ PORT GREATER THAN 5 2020 [facility]    IR INS PICC VAD W SQ PORT GREATER THAN 5  2021    IR INS PICC VAD W SQ PORT GREATER THAN 5 2021 [facility]    IR INS PICC VAD W SQ PORT GREATER THAN 5  4/8/2021    IR INS PICC VAD W SQ PORT GREATER THAN 5 4/8/2021 [facility]jo [facility]    LEG AMPUTATION BELOW KNEE Right 01/21/2017    LEG AMPUTATION BELOW KNEE Left 2/9/2021    LEFT  LEG AMPUTATION BELOW KNEE [facility] [address] Left 4/6/2021    STUMP DEBRIDEMENT INCISION AND DRAINAGE performed by Ramonita Singer MD at [address] [facility] [address] Right 2014    rt 3rd through 5th digits    TOE AMPUTATION Left 5/26/2016    left foot 5th toe    TOE AMPUTATION Left 8/5/2020    FOOT TRANSMETATARSAL  AMPUTATION - AND LEFT PECUTANEOUS TENDO ACHILLES LENGTHENING performed by Homero Lew DPM at [address] TOE AMPUTATION Left 8/24/2020    REVISION  TRANSMETATARSAL AMPUTATION WITH DEBRIDEMENT. performed by Homero Lew DPM at [address]      5/14/13- with dilation    VASCULAR SURGERY Right 01/16/2017    foot guillotine amputation       Immunization History:   Immunization History   Administered Date(s) Administered    Influenza Vaccine, unspecified formulation 10/10/2016    Influenza Virus Vaccine 11/03/2014, 10/29/2015    Influenza, Nirmal Rowland, IM, (6 mo and older Fluzone, Flulaval, Fluarix and 3 yrs and older Afluria) 10/10/2016    Pneumococcal Polysaccharide ([id number]) 09/05/2012, 10/29/2015    Tdap (Boostrix, Adacel) 07/01/2019       Active Problems:  Patient Active Problem List   Diagnosis Code    Type 2 diabetes mellitus with diabetic polyneuropathy, with long-term current use of insulin ([facility]) E11.42, Z79.4    Neuropathic pain, leg M79.2    Diabetic polyneuropathy ([facility] [address] E11.42    Allergic rhinitis J30.9    Tobacco dependence F17.200    Edentulous K08.109    Dysphagia R13.10    Lung nodules R91.8    Esophageal cancer ([facility]) C15.9    Fracture of humerus, left, closed S42.302A    Closed fracture of humerus S42.309A    DM type 2 with diabetic peripheral PACO Garcia  Body Position: supine  Taken 6/9/2025 1330 by Jose Villagran RN  Body Position: supine  Skin Protection: incontinence pads utilized  Taken 6/9/2025 1200 by Jose Villagran RN  Body Position: supine  Taken 6/9/2025 1000 by oJse Villagran RN  Body Position: supine  Taken 6/9/2025 0911 by Jose Villagran RN  Body Position: supine  Skin Protection: incontinence pads utilized  Goal: Optimal Comfort and Wellbeing  Outcome: Progressing  Goal: Readiness for Transition of Care  Outcome: Progressing     Problem: Fall Injury Risk  Goal: Absence of Fall and Fall-Related Injury  Outcome: Progressing  Intervention: Promote Injury-Free Environment  Recent Flowsheet Documentation  Taken 6/9/2025 1600 by Jose Villagran RN  Safety Promotion/Fall Prevention:   assistive device/personal items within reach   fall prevention program maintained   nonskid shoes/slippers when out of bed   safety round/check completed  Taken 6/9/2025 1330 by Jose Villagran RN  Safety Promotion/Fall Prevention:   assistive device/personal items within reach   fall prevention program maintained   nonskid shoes/slippers when out of bed   safety round/check completed  Taken 6/9/2025 1200 by Jose Villagran RN  Safety Promotion/Fall Prevention:   assistive device/personal items within reach   fall prevention program maintained   nonskid shoes/slippers when out of bed   safety round/check completed  Taken 6/9/2025 1000 by Jose Villagran RN  Safety Promotion/Fall Prevention:   assistive device/personal items within reach   fall prevention program maintained   nonskid shoes/slippers when out of bed   safety round/check completed  Taken 6/9/2025 0911 by Jose Villagran RN  Safety Promotion/Fall Prevention:   assistive device/personal items within reach   fall prevention program maintained   nonskid shoes/slippers when out of bed   safety round/check completed     Problem: Skin Injury Risk Increased  Goal: Skin Health and  Integrity  Outcome: Progressing  Intervention: Optimize Skin Protection  Recent Flowsheet Documentation  Taken 6/9/2025 1600 by Jose Villagran RN  Head of Bed ([facility]) Positioning: HOB at 30 degrees  Taken 6/9/2025 1330 by Jose Villagran RN  Pressure Reduction Techniques:   frequent weight shift encouraged   weight shift assistance provided  Head of Bed ([facility]) Positioning: HOB at 30 degrees  Pressure Reduction Devices: pressure-redistributing mattress utilized  Skin Protection: incontinence pads utilized  Taken 6/9/2025 1200 by Jose Villagran RN  Head of Bed ([facility]) Positioning: HOB at 60 degrees  Taken 6/9/2025 1000 by Jose Villagran RN  Head of Bed ([facility]) Positioning: HOB at 30 degrees  Taken 6/9/2025 0911 by Jose Villagran RN  Pressure Reduction Techniques:   frequent weight shift encouraged   weight shift assistance provided  Head of Bed ([facility]) Positioning: HOB at 30 degrees  Pressure Reduction Devices: pressure-redistributing mattress utilized  Skin Protection: incontinence pads utilized     Problem: Comorbidity Management  Goal: Maintenance of Behavioral Health Symptom Control  Outcome: Progressing  Goal: Blood Glucose Level Within Target Range  Outcome: Progressing  Goal: Maintenance of Heart Failure Symptom Control  Outcome: Progressing  Goal: Blood Pressure in Desired Range  Outcome: Progressing  Goal: Maintenance of Osteoarthritis Symptom Control  Outcome: Progressing     Problem: Cardiac Catheterization (Diagnostic/Interventional)  Goal: Absence of Bleeding  Outcome: Progressing  Goal: Absence of Contrast-Induced Injury  Outcome: Progressing  Goal: Stable Heart Rate and Rhythm  Outcome: Progressing  Goal: Absence of Embolism Signs and Symptoms  Outcome: Progressing  Goal: Anesthesia/Sedation Recovery  Outcome: Progressing  Intervention: Optimize Anesthesia Recovery  Recent Flowsheet Documentation  Taken 6/9/2025 1600 by Jose Villagran RN  Safety Promotion/Fall Prevention:   assistive  neuropathy (HCC) E11.42    COPD without exacerbation ([facility]) J44.9    HLD (hyperlipidemia) E78.5    Gastroesophageal reflux disease K21.9    Chronic pain syndrome G89.4    Marijuana use F12.90    History of colon polyps Z86.010    Functional diarrhea K59.1    Sepsis due to methicillin resistant Staphylococcus aureus (MRSA) ([facility]) A41.02    History of esophageal cancer Z85.01    Osteomyelitis, chronic, ankle or foot ([facility] [address] M86.679    PAD (peripheral artery disease) ([facility]) I73.9    Other pulmonary embolism without acute cor pulmonale ([facility]) I26.99    Carotid stenosis, asymptomatic, bilateral I65.23    Lower limb amputation status Z89.619    Fx humeral neck, right, closed, initial encounter S42.211A    Transient hypotension I95.9    Constipation due to opioid therapy K59.03, T40.2X5A    Acute kidney injury ([facility] [address] W09.5    Complication of below knee amputation stump ([facility]) T87.9    COPD exacerbation ([facility]) J44.1    Hyponatremia E87.1    Pain, phantom limb ([facility]) G54.6    S/P BKA (below knee amputation) bilateral ([facility]) Z89.512, Z89.511    Moderate protein malnutrition ([facility]) E44.0    Essential hypertension I10    Uncontrolled type 2 diabetes mellitus with hyperglycemia ([facility]) E11.65    History of pulmonary embolism - 2017 Z86. 80    Atelectasis J98.11    Uncontrolled type 2 diabetes mellitus with foot ulcer ([facility]) E11.621, L97.509, E11.65    Azotemia R79.89    Anemia, normocytic normochromic D64.9    Drug-induced constipation K59.03    Osteomyelitis of metatarsals of left foot ([facility]) M86.9    Diabetic foot infection ([facility]) E11.628, L08.9    Noncompliance Z91.19    Type 1 diabetes mellitus with diabetic foot infection ([facility] [address] E10.628, L08.9    Acute osteomyelitis of left foot ([facility]) M86.172    Cellulitis of left foot L03. 116    Mild malnutrition ([facility]) E44.1    Hypocalcemia E83.51    Hypokalemia E87.6    Moderate malnutrition ([facility]) E44.0    History of below knee amputation, right ([facility] [address] Z89.511    device/personal items within reach   fall prevention program maintained   nonskid shoes/slippers when out of bed   safety round/check completed  Taken 6/9/2025 1330 by Jose Villagran RN  Safety Promotion/Fall Prevention:   assistive device/personal items within reach   fall prevention program maintained   nonskid shoes/slippers when out of bed   safety round/check completed  Taken 6/9/2025 1200 by Jose Villagran RN  Safety Promotion/Fall Prevention:   assistive device/personal items within reach   fall prevention program maintained   nonskid shoes/slippers when out of bed   safety round/check completed  Taken 6/9/2025 1000 by Jose Villagran RN  Safety Promotion/Fall Prevention:   assistive device/personal items within reach   fall prevention program maintained   nonskid shoes/slippers when out of bed   safety round/check completed  Taken 6/9/2025 0911 by Jose Villagran RN  Safety Promotion/Fall Prevention:   assistive device/personal items within reach   fall prevention program maintained   nonskid shoes/slippers when out of bed   safety round/check completed  Goal: Optimal Pain Control and Function  Outcome: Progressing  Goal: Absence of Vascular Access Complication  Outcome: Progressing  Intervention: Prevent and Manage Access Complications  Recent Flowsheet Documentation  Taken 6/9/2025 1600 by Jose Villagran RN  Head of Bed (HOB) Positioning: HOB at 30 degrees  Taken 6/9/2025 1330 by Jose Villagran RN  Head of Bed (HOB) Positioning: HOB at 30 degrees  Taken 6/9/2025 1200 by Jose Villagran RN  Head of Bed (HOB) Positioning: HOB at 60 degrees  Taken 6/9/2025 1000 by Jose Villagran RN  Head of Bed (HOB) Positioning: HOB at 30 degrees  Taken 6/9/2025 0911 by Jose Villagran RN  Head of Bed (HOB) Positioning: HOB at 30 degrees   Goal Outcome Evaluation:  Plan of Care Reviewed With: patient        Progress: improving  Outcome Evaluation: Patient has been cooperative during shift. No  Mobility/ADLs:  Walking   Dependent  Transfer  Assisted  Bathing  Assisted  Dressing  Assisted  Toileting  Independent  Feeding  Independent  Med [address]   Independent  Med Delivery   whole    Wound Care Documentation and Therapy:    Wound 04/21/21 Leg Left;Distal;Medial #1 (Active)   Wound Etiology Non-Healing Surgical 05/26/21 0850   Dressing Status Old drainage noted;New dressing applied 05/26/21 0850   Wound Cleansed Cleansed with saline 05/26/21 0850   Dressing/Treatment Dry dressing 05/26/21 0400   Dressing Change Due 05/27/21 05/26/21 0850   Wound Length (cm) 0.3 cm 05/26/21 0850   Wound Width (cm) 0.8 cm 05/26/21 0850   Wound Depth (cm) 0.1 cm 05/26/21 0850   Wound Surface Area (cm^2) 0.24 cm^2 05/26/21 0850   Change in Wound Size % (l*w) 90.91 05/26/21 0850   Wound Volume (cm^3) 0.02 cm^3 05/26/21 0850   Wound Healing % 98 05/26/21 0850   Post-Procedure Length (cm) 0.2 cm 05/24/21 1320   Post-Procedure Width (cm) 0.3 cm 05/24/21 1320   Post-Procedure Depth (cm) 0.1 cm 05/24/21 1320   Post-Procedure Surface Area (cm^2) 0.06 cm^2 05/24/21 1320   Post-Procedure Volume (cm^3) 0.01 cm^3 05/24/21 1320   Wound Assessment Epithelialization;Pink/red 05/26/21 0850   Drainage Amount Scant 05/26/21 0850   Drainage Description Serosanguinous 05/26/21 0850   Odor None 05/26/21 0850   Odalys-wound Assessment Dry/flaky 05/26/21 0850   Margins Attached edges 05/26/21 0850   Wound Thickness Description not for Pressure Injury Full thickness 05/24/21 1257   Number of days: 34       Wound 04/21/21 Leg Left;Distal;Lateral #2 (Active)   Wound Etiology Non-Healing Surgical 05/26/21 0850   Dressing Status Old drainage noted;New dressing applied 05/26/21 0850   Wound Cleansed Cleansed with saline 05/26/21 0850   Dressing/Treatment Dry dressing 05/26/21 0400   Dressing Change Due 05/27/21 05/26/21 0850   Wound Length (cm) 1.7 cm 05/26/21 0850   Wound Width (cm) 1.7 cm 05/26/21 0850   Wound Depth (cm) 0.7 cm 05/26/21 0850   Wound Surface complaints of pain, nausea or SOA. AOx2, assist x1, room air, SR/SB. Cardiac EP consulted. Zio patch at discharge. Precert started for SNF. Will continue to monitor and assist patient as needed.                              Area (cm^2) 2.89 cm^2 05/26/21 0850   Change in Wound Size % (l*w) -19.42 05/26/21 0850   Wound Volume (cm^3) 2.02 cm^3 05/26/21 0850   Wound Healing % 24 05/26/21 0850   Post-Procedure Length (cm) 1.7 cm 05/24/21 1320   Post-Procedure Width (cm) 1.4 cm 05/24/21 1320   Post-Procedure Depth (cm) 0.5 cm 05/24/21 1320   Post-Procedure Surface Area (cm^2) 2.38 cm^2 05/24/21 1320   Post-Procedure Volume (cm^3) 1.19 cm^3 05/24/21 1320   Distance Tunneling (cm) 1.4 cm 05/12/21 1433   Tunneling Position ___ O'Clock 10:00 05/12/21 1433   Wound Assessment Pink/red;Slough 05/26/21 0850   Drainage Amount Moderate 05/26/21 0850   Drainage Description Serosanguinous 05/26/21 0850   Odor None 05/26/21 0850   Odalys-wound Assessment Dry/flaky 05/26/21 0850   Margins Defined edges;Epibole (rolled edges) 05/26/21 0850   Wound Thickness Description not for Pressure Injury Full thickness 05/24/21 1257   Number of days: 34       Wound 04/21/21 Leg Left;Proximal #3 (Active)   Wound Etiology Non-Healing Surgical 05/26/21 0850   Dressing Status New dressing applied;Dry 05/26/21 0850   Wound Cleansed Cleansed with saline 05/26/21 0850   Dressing/Treatment Dry dressing 05/26/21 0400   Dressing Change Due 05/27/21 05/26/21 0850   Wound Length (cm) 0.7 cm 05/26/21 0850   Wound Width (cm) 0.8 cm 05/26/21 0850   Wound Depth (cm) 0.2 cm 05/26/21 0850   Wound Surface Area (cm^2) 0.56 cm^2 05/26/21 0850   Change in Wound Size % (l*w) 0 05/26/21 0850   Wound Volume (cm^3) 0.11 cm^3 05/26/21 0850   Wound Healing % -83 05/26/21 0850   Post-Procedure Length (cm) 0.6 cm 05/24/21 1320   Post-Procedure Width (cm) 0.7 cm 05/24/21 1320   Post-Procedure Depth (cm) 0.1 cm 05/24/21 1320   Post-Procedure Surface Area (cm^2) 0.42 cm^2 05/24/21 1320   Post-Procedure Volume (cm^3) 0.04 cm^3 05/24/21 1320   Wound Assessment Fibrin;Slough 05/26/21 0850   Drainage Amount None 05/26/21 0850   Drainage Description Serosanguinous 05/24/21 1257   Odor None 05/26/21 0850 Odalys-wound Assessment Dry/flaky 05/26/21 0850   Margins Defined edges 05/26/21 0850   Wound Thickness Description not for Pressure Injury Full thickness 05/24/21 1257   Number of days: 34     Left leg wounds: Cleanse with saline, pat dry. Apply maxorb ag to wounds (moisten with saline for proximal wound), cover with dry gauze, wrap with roll gauze, secure with ace wrap. Change daily and as needed if loose or soiled. Elimination:  Continence:   · Bowel: Yes  · Bladder: Yes  Urinary Catheter: None   Colostomy/Ileostomy/Ileal Conduit: No       Date of Last BM: ***    Intake/Output Summary (Last 24 hours) at 5/26/2021 0843  Last data filed at 5/26/2021 0753  Gross per 24 hour   Intake 400 ml   Output 3250 ml   Net -2850 ml     I/O last 3 completed shifts: In: 400 [P.O.:400]  Out: 2850 [Urine:2850]    Safety Concerns: At Risk for Falls    Impairments/Disabilities:      Amputation - BLE BKA    Nutrition Therapy:  Current Nutrition Therapy:   - Oral Diet:  Carb Control 4 carbs/meal (1800kcals/day)    Routes of Feeding: Oral  Liquids: No Restrictions  Daily Fluid Restriction: no  Last Modified Barium Swallow with Video (Video Swallowing Test): not done    Treatments at the Time of Hospital Discharge:   Respiratory Treatments: ***  Oxygen Therapy:  is on oxygen at 2 L/min per nasal cannula.   Ventilator:    - No ventilator support    Rehab Therapies: Physical Therapy and Occupational Therapy  Weight Bearing Status/Restrictions: No weight bearing restirctions  Other Medical Equipment (for information only, NOT a DME order):  wheelchair, walker and bedside commode  Other Treatments: ***    Patient's personal belongings (please select all that are sent with patient):  None    RN SIGNATURE:  Electronically signed by Maria L Dimas RN on 5/28/21 at 10:00 AM EDT    CASE MANAGEMENT/SOCIAL WORK SECTION    Inpatient Status Date: ***    Readmission Risk Assessment Score:  Readmission Risk              Risk of Unplanned Readmission:  81           Discharging to Facility/ Agency    Name: Name: [facility] Address: [address] [place] [address]   Phone: [phone]  · Fax:  [phone]      Dialysis Facility (if applicable)   · Name:  · Address:  · Dialysis Schedule:  · Phone:  · Fax:    / signature: Electronically signed by HUNTER Durand on 5/26/21 at 8:44 AM EDT    PHYSICIAN SECTION    Prognosis: Good    Condition at Discharge: Stable    Rehab Potential (if transferring to Rehab): Fair    Recommended Labs or Other Treatments After Discharge: pt,ot    Physician Certification: I certify the above information and transfer of Jayde Brower  is necessary for the continuing treatment of the diagnosis listed and that he requires [facility] for greater 30 days.      Update Admission H&P: No change in H&P    PHYSICIAN SIGNATURE:  Electronically signed by Micah Maxwell MD on 5/27/21 at 4:56 PM EDT

## (undated) DEVICE — GAUZE,PACKING STRIP,PLAIN,1/4"X5YD,STRL: Brand: CURAD

## (undated) DEVICE — Device

## (undated) DEVICE — SPONGE LAP W18XL18IN WHT COT 4 PLY FLD STRUNG RADPQ DISP ST

## (undated) DEVICE — SWAB CULT CLR BLU PLAS RAYON LIQ AMIES AERB ANAERB FRIC CAP

## (undated) DEVICE — PADDING CAST W4INXL4YD SPUN DACRON POLY POR SYN VERSATILE

## (undated) DEVICE — BLANKET WRM W29.9XL79.1IN UP BODY FORC AIR MISTRAL-AIR

## (undated) DEVICE — DRAPE XR CASS L UNIV FIT ADH CLSR

## (undated) DEVICE — STAZ CYSTO: Brand: MEDLINE INDUSTRIES, INC.

## (undated) DEVICE — SKIN PREP TRAY W/CHG: Brand: MEDLINE INDUSTRIES, INC.

## (undated) DEVICE — RADIFOCUS GLIDEWIRE: Brand: GLIDEWIRE

## (undated) DEVICE — GOWN,SURGICAL,AURORA,SLEEVE: Brand: MEDLINE

## (undated) DEVICE — GAUZE,PACKING STRIP,IODOFORM,1/2"X5YD,ST: Brand: CURAD

## (undated) DEVICE — Z INACTIVE USE 2660664 SOLUTION IRRIG 3000ML 0.9% SOD CHL USP UROMATIC PLAS CONT

## (undated) DEVICE — SINGLE-USE DIGITAL FLEXIBLE URETEROSCOPE: Brand: LITHOVUE

## (undated) DEVICE — HANDPIECE SET WITH COAXIAL HIGH FLOW TIP AND SUCTION TUBE: Brand: INTERPULSE

## (undated) DEVICE — GLOVE SURG SZ 7 CRM LTX FREE POLYISOPRENE POLYMER BEAD ANTI

## (undated) DEVICE — DRESSING PETRO W3XL8IN OIL EMUL N ADH GZ KNIT IMPREG CELOS

## (undated) DEVICE — MASTISOL ADHESIVE LIQ 2/3ML

## (undated) DEVICE — 3M™ PRECISE™ VISTA DISPOSABLE SKIN STAPLER 3995: Brand: 3M™ PRECISE™

## (undated) DEVICE — GOWN,SIRUS,NON REINFRCD,LARGE,SET IN SL: Brand: MEDLINE

## (undated) DEVICE — PREP-RESISTANT MARKER W/ RULER: Brand: MEDLINE INDUSTRIES, INC.

## (undated) DEVICE — CATHETER URET 8FR L65CM PLAS OPN CONE TIP RADPQ DISP

## (undated) DEVICE — BANDAGE COBAN 4 IN COMPR W4INXL5YD FOAM COHESIVE QUIK STK SELF ADH SFT

## (undated) DEVICE — ELECTRODE PT RET AD L9FT HI MOIST COND ADH HYDRGEL CORDED

## (undated) DEVICE — 450 ML BOTTLE OF 0.05% CHLORHEXIDINE GLUCONATE IN 99.95% STERILE WATER FOR IRRIGATION, USP AND APPLICATOR.: Brand: IRRISEPT ANTIMICROBIAL WOUND LAVAGE

## (undated) DEVICE — TOURNIQUET CUF BLD PRESSURE 4X18 IN 2 PRT SINGLE BLDR RED

## (undated) DEVICE — FIBER ENT HOLM LSR 200 MIC STRL LTX FRE

## (undated) DEVICE — CONE TIP URETERAL CATHETER: Brand: URETERAL CATHETER

## (undated) DEVICE — SUTURE VCRL SZ 3-0 L54IN ABSRB UD LIGAPAK REEL POLYGLACTIN J285G

## (undated) DEVICE — COVER LT HNDL BLU PLAS

## (undated) DEVICE — PRECISION THIN (9.0 X 0.38 X 25.0MM)

## (undated) DEVICE — CONTAINER,SPECIMEN,OR STERILE,4OZ: Brand: MEDLINE

## (undated) DEVICE — MARKER,SKIN,WI/RULER AND LABELS: Brand: MEDLINE

## (undated) DEVICE — COVER,MAYO STAND,XL,STERILE: Brand: MEDLINE

## (undated) DEVICE — GAUZE,SPONGE,4"X4",16PLY,XRAY,STRL,LF: Brand: MEDLINE

## (undated) DEVICE — YANKAUER,FLEXIBLE HANDLE,REGLR CAPACITY: Brand: MEDLINE INDUSTRIES, INC.

## (undated) DEVICE — TUBING, SUCTION, 1/4" X 12', STRAIGHT: Brand: MEDLINE

## (undated) DEVICE — STOCKINETTE,DOUBLE PLY,6X48,STERILE: Brand: MEDLINE

## (undated) DEVICE — GLOVE SURG SZ 8 CRM LTX FREE POLYISOPRENE POLYMER BEAD ANTI

## (undated) DEVICE — CATHETER,FOLEY,3WAY,SILI-ELAST,20FR,30ML: Brand: MEDLINE

## (undated) DEVICE — CUFF REPROCESS BP TOURN SING BLDR 2 PORT 4X18IN

## (undated) DEVICE — STRIP,CLOSURE,WOUND,MEDI-STRIP,1/2X4: Brand: MEDLINE

## (undated) DEVICE — SOLUTION IRRIG 3000ML STRL H2O USP UROMATIC PLAS CONT

## (undated) DEVICE — SUTURE ETHBND EXCEL SZ 2-0 L30IN NONABSORBABLE GRN L26MM SH X833H

## (undated) DEVICE — STRIP SKIN CLSR W1XL5IN NYLON REINF CURAD STERIL

## (undated) DEVICE — BAG,LEG,COMFORT-STRAPS,LARGE,32OZ: Brand: MEDLINE

## (undated) DEVICE — DRAPE,REIN 53X77,STERILE: Brand: MEDLINE

## (undated) DEVICE — TOWEL,OR,DSP,ST,BLUE,DLX,XR,4/PK,20PK/CS: Brand: MEDLINE

## (undated) DEVICE — STOCKINETTE,IMPERVIOUS,12X48,STERILE: Brand: MEDLINE

## (undated) DEVICE — NEEDLE HYPO 25GA L1.5IN BLU POLYPR HUB S STL REG BVL STR

## (undated) DEVICE — SYRINGE MED 30ML STD CLR PLAS LUERLOCK TIP N CTRL DISP

## (undated) DEVICE — SET,IRRIGATION,CYSTO/TUR,90": Brand: MEDLINE

## (undated) DEVICE — SOLUTION IRRIG 3000ML 0.9% SOD CHL USP UROMATIC PLAS CONT

## (undated) DEVICE — GAUZE,SPONGE,FLUFF,4"X4",12PLY,ST,10/TR: Brand: MEDLINE

## (undated) DEVICE — CHECK-FLO HEMOSTASIS ASSEMBLY: Brand: CHECK-FLO

## (undated) DEVICE — GLOVE SURG SZ 8 L11.77IN FNGR THK9.8MIL STRW LTX POLYMER

## (undated) DEVICE — SUTURE ETHLN SZ 3-0 L18IN NONABSORBABLE BLK PS-2 L19MM 3/8 1669H

## (undated) DEVICE — SYRINGE MED 20ML STD CLR PLAS LUERLOCK TIP N CTRL DISP

## (undated) DEVICE — TUR/ENDOSCOPIC CABLE, 10' (3.05 M): Brand: CONMED

## (undated) DEVICE — INTENDED FOR TISSUE SEPARATION, AND OTHER PROCEDURES THAT REQUIRE A SHARP SURGICAL BLADE TO PUNCTURE OR CUT.: Brand: BARD-PARKER ® CARBON RIB-BACK BLADES

## (undated) DEVICE — PAD,ABDOMINAL,5"X9",ST,LF,25/BX: Brand: MEDLINE INDUSTRIES, INC.

## (undated) DEVICE — GLOVE ORTHO 8   MSG9480

## (undated) DEVICE — GLOVE SURG SZ 65 CRM LTX FREE POLYISOPRENE POLYMER BEAD ANTI

## (undated) DEVICE — APPLICATOR MEDICATED 26 CC SOLUTION HI LT ORNG CHLORAPREP

## (undated) DEVICE — BANDAGE,GAUZE,BULKEE II,4.5"X4.1YD,STRL: Brand: MEDLINE

## (undated) DEVICE — CYSTO PACK: Brand: CARDINAL HEALTH

## (undated) DEVICE — GAUZE,SPONGE,FLUFF,6"X6.75",STRL,5/TRAY: Brand: MEDLINE

## (undated) DEVICE — SPONGE GZ W4XL4IN 12 PLY KERLIX

## (undated) DEVICE — BANDAGE COMPR W4INXL5YD WHT BGE POLY COT M E WRP WV HK AND

## (undated) DEVICE — BNDG,ELSTC,MATRIX,STRL,6"X5YD,LF,HOOK&LP: Brand: MEDLINE

## (undated) DEVICE — GLOVE SURG SZ 7 L12IN FNGR THK79MIL GRN LTX FREE

## (undated) DEVICE — SUTURE VCRL SZ 0 L36IN ABSRB UD L36MM CT-1 1/2 CIR J946H

## (undated) DEVICE — SMALL TEAR CROSS CUT RASP (11.0 X 5.0MM)

## (undated) DEVICE — TUBE IRRIG HNDPC HI FLO TP INTRPULS W/SUCTION TUBE

## (undated) DEVICE — STRAP,CATHETER,ELASTIC,HOOK&LOOP: Brand: MEDLINE

## (undated) DEVICE — TRAY PREP DRY W/ PREM GLV 2 APPL 6 SPNG 2 UNDPD 1 OVERWRAP

## (undated) DEVICE — DRAINBAG,ANTI-REFLUX TOWER,L/F,2000ML,LL: Brand: MEDLINE

## (undated) DEVICE — ZIMMER® STERILE DISPOSABLE TOURNIQUET CUFF WITH PLC, DUAL PORT, SINGLE BLADDER, 34 IN. (86 CM)

## (undated) DEVICE — LARGE TEAR CROSS CUT RASP (14.0 X 7.0MM)

## (undated) DEVICE — SYRINGE 20ML LL S/C 50

## (undated) DEVICE — Y-TYPE TUR/BLADDER IRRIGATION SET, REGULATING CLAMP

## (undated) DEVICE — SOLUTION IRRIGATION STRL H2O 1000 ML UROMATIC CONTAINER

## (undated) DEVICE — GLOVE SURG SZ 6 CRM LTX FREE POLYISOPRENE POLYMER BEAD ANTI

## (undated) DEVICE — ELECTRODE ES L3IN S STL BLDE INSUL DISP VALLEYLAB EDGE

## (undated) DEVICE — DRAPE C ARM UNIV W41XL74IN CLR PLAS XR VELC CLSR POLY STRP

## (undated) DEVICE — BANDAGE COMPR W6INXL5YD WHT BGE POLY COT M E WRP WV HK AND

## (undated) DEVICE — DRESSING,GAUZE,XEROFORM,CURAD,5"X9",ST: Brand: CURAD

## (undated) DEVICE — CATHETER URET 5FR L70CM TIP 8FR OPN END CONE TIP INJ HUB

## (undated) DEVICE — SPLINT ORTH W5XL30IN WHT FBRGLS 1 SIDE FELT PD CNFRM LO

## (undated) DEVICE — SUTURE VCRL SZ 3-0 L36IN ABSRB UD L36MM CT-1 1/2 CIR J944H

## (undated) DEVICE — SOLUTION IV IRRIG 500ML 0.9% SODIUM CHL 2F7123

## (undated) DEVICE — SINGLE ACTION PUMPING SYSTEM

## (undated) DEVICE — DRAPE 24X23IN RADIOLOGY CASS ADHES STRIP